# Patient Record
Sex: FEMALE | Race: WHITE | Employment: OTHER | URBAN - NONMETROPOLITAN AREA
[De-identification: names, ages, dates, MRNs, and addresses within clinical notes are randomized per-mention and may not be internally consistent; named-entity substitution may affect disease eponyms.]

---

## 2017-05-01 ENCOUNTER — TELEPHONE (OUTPATIENT)
Dept: FAMILY MEDICINE CLINIC | Age: 67
End: 2017-05-01

## 2017-10-04 ENCOUNTER — OFFICE VISIT (OUTPATIENT)
Dept: FAMILY MEDICINE CLINIC | Age: 67
End: 2017-10-04
Payer: MEDICARE

## 2017-10-04 VITALS
HEIGHT: 57 IN | RESPIRATION RATE: 24 BRPM | HEART RATE: 83 BPM | WEIGHT: 280.6 LBS | SYSTOLIC BLOOD PRESSURE: 138 MMHG | DIASTOLIC BLOOD PRESSURE: 72 MMHG | BODY MASS INDEX: 60.54 KG/M2 | OXYGEN SATURATION: 96 %

## 2017-10-04 DIAGNOSIS — I50.9 ACUTE ON CHRONIC CONGESTIVE HEART FAILURE, UNSPECIFIED CONGESTIVE HEART FAILURE TYPE: Primary | ICD-10-CM

## 2017-10-04 DIAGNOSIS — R06.02 SOB (SHORTNESS OF BREATH) ON EXERTION: ICD-10-CM

## 2017-10-04 DIAGNOSIS — E66.01 MORBID OBESITY WITH BMI OF 60.0-69.9, ADULT (HCC): ICD-10-CM

## 2017-10-04 PROCEDURE — G8484 FLU IMMUNIZE NO ADMIN: HCPCS | Performed by: FAMILY MEDICINE

## 2017-10-04 PROCEDURE — 1123F ACP DISCUSS/DSCN MKR DOCD: CPT | Performed by: FAMILY MEDICINE

## 2017-10-04 PROCEDURE — 4040F PNEUMOC VAC/ADMIN/RCVD: CPT | Performed by: FAMILY MEDICINE

## 2017-10-04 PROCEDURE — G8417 CALC BMI ABV UP PARAM F/U: HCPCS | Performed by: FAMILY MEDICINE

## 2017-10-04 PROCEDURE — 1090F PRES/ABSN URINE INCON ASSESS: CPT | Performed by: FAMILY MEDICINE

## 2017-10-04 PROCEDURE — G8427 DOCREV CUR MEDS BY ELIG CLIN: HCPCS | Performed by: FAMILY MEDICINE

## 2017-10-04 PROCEDURE — G8400 PT W/DXA NO RESULTS DOC: HCPCS | Performed by: FAMILY MEDICINE

## 2017-10-04 PROCEDURE — 1036F TOBACCO NON-USER: CPT | Performed by: FAMILY MEDICINE

## 2017-10-04 PROCEDURE — 99214 OFFICE O/P EST MOD 30 MIN: CPT | Performed by: FAMILY MEDICINE

## 2017-10-04 PROCEDURE — 3017F COLORECTAL CA SCREEN DOC REV: CPT | Performed by: FAMILY MEDICINE

## 2017-10-04 PROCEDURE — 3014F SCREEN MAMMO DOC REV: CPT | Performed by: FAMILY MEDICINE

## 2017-10-04 RX ORDER — BUMETANIDE 0.5 MG/1
0.5 TABLET ORAL DAILY
Qty: 30 TABLET | Refills: 0 | Status: SHIPPED | OUTPATIENT
Start: 2017-10-04 | End: 2017-10-12 | Stop reason: SDUPTHER

## 2017-10-04 ASSESSMENT — ENCOUNTER SYMPTOMS
BLOOD IN STOOL: 0
COUGH: 0
EYE PAIN: 0
SORE THROAT: 0
RHINORRHEA: 0
WHEEZING: 0
VOMITING: 0
CHEST TIGHTNESS: 0
BACK PAIN: 0
SHORTNESS OF BREATH: 1
DIARRHEA: 0
CONSTIPATION: 0
NAUSEA: 0
ABDOMINAL PAIN: 0

## 2017-10-04 NOTE — PROGRESS NOTES
Subjective:      Patient ID: Tanvir Mo is a 74 y.o. female. HPI  Pt here for bilateral lower leg swelling for the last 3 months. She recently retired and has not done much since. Reviewed BMI of 61. Encouraged diet, exercise and weight loss. Is on HCTZ 25 mg daily. Minimal salt intake but drinks gillette Dr. Winnie Hill daily. Single, non smoker, pmh reviewed. Review of Systems   Constitutional: Negative for chills, fatigue, fever and unexpected weight change. HENT: Negative for congestion, ear pain, rhinorrhea and sore throat. Eyes: Negative for pain and visual disturbance. Respiratory: Positive for shortness of breath (with activity). Negative for cough, chest tightness and wheezing. Cardiovascular: Positive for leg swelling. Negative for chest pain and palpitations. Gastrointestinal: Negative for abdominal pain, blood in stool, constipation, diarrhea, nausea and vomiting. Genitourinary: Negative for difficulty urinating, frequency, hematuria and urgency. Musculoskeletal: Negative for back pain, joint swelling, myalgias and neck pain. Skin: Negative for rash. Neurological: Negative for dizziness and headaches. Hematological: Negative for adenopathy. Does not bruise/bleed easily. Psychiatric/Behavioral: Negative for behavioral problems and sleep disturbance. The patient is not nervous/anxious. Objective:   Physical Exam   Constitutional: She is oriented to person, place, and time. She appears well-developed and well-nourished. HENT:   Head: Normocephalic and atraumatic. Right Ear: External ear normal.   Left Ear: External ear normal.   Nose: Nose normal.   Mouth/Throat: Oropharynx is clear and moist.   Eyes: EOM are normal. Pupils are equal, round, and reactive to light. Neck: Neck supple. No thyromegaly present. Cardiovascular: Normal rate, regular rhythm and normal heart sounds. Pulmonary/Chest: Breath sounds normal. She has no wheezes. She has no rales. Abdominal: Soft. Bowel sounds are normal. There is no tenderness. There is no rebound and no guarding. Musculoskeletal: Normal range of motion. Right lower leg: She exhibits edema. Left lower leg: She exhibits edema. 3+ pitting edema bilaterally, some open seeping areas. Lymphadenopathy:     She has no cervical adenopathy. Neurological: She is alert and oriented to person, place, and time. She has normal reflexes. No cranial nerve deficit. Skin: Skin is warm and dry. No rash noted. Psychiatric: She has a normal mood and affect. Nursing note and vitals reviewed. Assessment:      CHF exacerbation  Morbid Obesity  SOB      Plan:      Bmp  And bnp  Add bumex 0.5 mg daily  Recheck in 1 week. Daily weights. Limit salt and caffeine.

## 2017-10-04 NOTE — PATIENT INSTRUCTIONS
sometimes lost with diuretics. ¨ Aspirin and other blood thinners prevent blood clots, which can cause a stroke or heart attack. You will get more details on the specific medicines your doctor prescribes. Diet  · Your doctor may suggest that you limit sodium to 2,000 milligrams (mg) a day or less. That is less than 1 teaspoon of salt a day, including all the salt you eat in cooking or in packaged foods. People get most of their sodium from processed foods. Fast food and restaurant meals also tend to be very high in sodium. · Ask your doctor how much liquid you can drink each day. You may have to limit liquids. Weight  · Weigh yourself without clothing at the same time each day. Record your weight. Call your doctor if you have a sudden weight gain, such as more than 2 to 3 pounds in a day or 5 pounds in a week. (Your doctor may suggest a different range of weight gain.) A sudden weight gain may mean that your heart failure is getting worse. Activity level  · Start light exercise (if your doctor says it is okay). Even if you can only do a small amount, exercise will help you get stronger, have more energy, and manage your weight and your stress. Walking is an easy way to get exercise. Start out by walking a little more than you did before. Bit by bit, increase the amount you walk. · When you exercise, watch for signs that your heart is working too hard. You are pushing yourself too hard if you cannot talk while you are exercising. If you become short of breath or dizzy or have chest pain, stop, sit down, and rest.  · If you feel \"wiped out\" the day after you exercise, walk slower or for a shorter distance until you can work up to a better pace. · Get enough rest at night. Sleeping with 1 or 2 pillows under your upper body and head may help you breathe easier. Lifestyle changes  · Do not smoke. Smoking can make a heart condition worse.  If you need help quitting, talk to your doctor about stop-smoking programs this information. DASH Diet: Care Instructions  Your Care Instructions  The DASH diet is an eating plan that can help lower your blood pressure. DASH stands for Dietary Approaches to Stop Hypertension. Hypertension is high blood pressure. The DASH diet focuses on eating foods that are high in calcium, potassium, and magnesium. These nutrients can lower blood pressure. The foods that are highest in these nutrients are fruits, vegetables, low-fat dairy products, nuts, seeds, and legumes. But taking calcium, potassium, and magnesium supplements instead of eating foods that are high in those nutrients does not have the same effect. The DASH diet also includes whole grains, fish, and poultry. The DASH diet is one of several lifestyle changes your doctor may recommend to lower your high blood pressure. Your doctor may also want you to decrease the amount of sodium in your diet. Lowering sodium while following the DASH diet can lower blood pressure even further than just the DASH diet alone. Follow-up care is a key part of your treatment and safety. Be sure to make and go to all appointments, and call your doctor if you are having problems. It's also a good idea to know your test results and keep a list of the medicines you take. How can you care for yourself at home? Following the DASH diet  · Eat 4 to 5 servings of fruit each day. A serving is 1 medium-sized piece of fruit, ½ cup chopped or canned fruit, 1/4 cup dried fruit, or 4 ounces (½ cup) of fruit juice. Choose fruit more often than fruit juice. · Eat 4 to 5 servings of vegetables each day. A serving is 1 cup of lettuce or raw leafy vegetables, ½ cup of chopped or cooked vegetables, or 4 ounces (½ cup) of vegetable juice. Choose vegetables more often than vegetable juice. · Get 2 to 3 servings of low-fat and fat-free dairy each day. A serving is 8 ounces of milk, 1 cup of yogurt, or 1 ½ ounces of cheese. · Eat 6 to 8 servings of grains each day.  A using beans and peas. Add garbanzo or kidney beans to salads. Make burritos and tacos with mashed mann beans or black beans. Where can you learn more? Go to https://Omnigytrisheb.[employer]. org and sign in to your [employer] account. Enter W154 in the Sprinkle box to learn more about \"DASH Diet: Care Instructions. \"     If you do not have an account, please click on the \"Sign Up Now\" link. Current as of: April 3, 2017  Content Version: 11.3  © [address], [place]. Care instructions adapted under license by [facility] [address] If you have questions about a medical condition or this instruction, always ask your healthcare professional. [address] any warranty or liability for your use of this information.

## 2017-10-04 NOTE — MR AVS SNAPSHOT
After Visit Summary             Rubin Grimes   10/4/2017 9:45 AM   Office Visit    Description:  Female : 1950   Provider:  Mer Gómez MD   Department:  [facility]              Your Follow-Up and Future Appointments         Below is a list of your follow-up and future appointments. This may not be a complete list as you may have made appointments directly with providers that we are not aware of or your providers may have made some for you. Please call your providers to confirm appointments. It is important to keep your appointments. Please bring your current insurance card, photo ID, co-pay, and all medication bottles to your appointment. If self-pay, payment is expected at the time of service. Your To-Do List     Follow-Up    Return in about 1 week (around 10/11/2017). Information from Your Visit        Department     Name Address Phone Fax    [facility] [address]  [facility] [address] [phone]      You Were Seen for:         Comments    Acute on chronic congestive heart failure, unspecified congestive heart failure type [facility])   [[id number]]         Vital Signs     Blood Pressure Pulse Respirations Height Weight Oxygen Saturation    138/72 (Site: Right Arm, Position: Sitting, Cuff Size: Large Adult) 83 24 4' 9\" (1.448 m) 280 lb 9.6 oz (127.3 kg) 96%    Body Mass Index Smoking Status                60.72 kg/m2 Never Smoker          Additional Information about your Body Mass Index (BMI)           Your BMI as listed above is considered obese (30 or more). BMI is an estimate of body fat, calculated from your height and weight. The higher your BMI, the greater your risk of heart disease, high blood pressure, type 2 diabetes, stroke, gallstones, arthritis, sleep apnea, and certain cancers. BMI is not perfect. It may overestimate body fat in athletes and people who are more muscular.   Even a small weight loss (between 5 and 10 percent of your current weight) by decreasing your calorie intake and becoming more physically active will help lower your risk of developing or worsening diseases associated with obesity. Learn more at: [employer].uk          Instructions         Heart Failure: Care Instructions  Your Care Instructions    Heart failure occurs when your heart does not pump as much blood as the body needs. Failure does not mean that the heart has stopped pumping but rather that it is not pumping as well as it should. Over time, this causes fluid buildup in your lungs and other parts of your body. Fluid buildup can cause shortness of breath, fatigue, swollen ankles, and other problems. By taking medicines regularly, reducing sodium (salt) in your diet, checking your weight every day, and making lifestyle changes, you can feel better and live longer. Follow-up care is a key part of your treatment and safety. Be sure to make and go to all appointments, and call your doctor if you are having problems. It's also a good idea to know your test results and keep a list of the medicines you take. How can you care for yourself at home? Medicines  · Be safe with medicines. Take your medicines exactly as prescribed. Call your doctor if you think you are having a problem with your medicine. · Do not take any vitamins, over-the-counter medicine, or herbal products without talking to your doctor first. Mardegertrude Arjun not take ibuprofen (Advil or Motrin) and naproxen (Aleve) without talking to your doctor first. They could make your heart failure worse. · You may be taking some of the following medicine. ¨ Beta-blockers can slow heart rate, decrease blood pressure, and improve your condition. Taking a beta-blocker may lower your chance of needing to be hospitalized. ¨ Angiotensin-converting enzyme inhibitors (ACEIs) reduce the heart's workload, lower blood pressure, and reduce swelling.  Taking an ACEI may lower your chance of needing to be hospitalized again. ¨ Angiotensin II receptor blockers (ARBs) work like ACEIs. Your doctor may prescribe them instead of ACEIs. ¨ Diuretics, also called water pills, reduce swelling. ¨ Potassium supplements replace this important mineral, which is sometimes lost with diuretics. ¨ Aspirin and other blood thinners prevent blood clots, which can cause a stroke or heart attack. You will get more details on the specific medicines your doctor prescribes. Diet  · Your doctor may suggest that you limit sodium to 2,000 milligrams (mg) a day or less. That is less than 1 teaspoon of salt a day, including all the salt you eat in cooking or in packaged foods. People get most of their sodium from processed foods. Fast food and restaurant meals also tend to be very high in sodium. · Ask your doctor how much liquid you can drink each day. You may have to limit liquids. Weight  · Weigh yourself without clothing at the same time each day. Record your weight. Call your doctor if you have a sudden weight gain, such as more than 2 to 3 pounds in a day or 5 pounds in a week. (Your doctor may suggest a different range of weight gain.) A sudden weight gain may mean that your heart failure is getting worse. Activity level  · Start light exercise (if your doctor says it is okay). Even if you can only do a small amount, exercise will help you get stronger, have more energy, and manage your weight and your stress. Walking is an easy way to get exercise. Start out by walking a little more than you did before. Bit by bit, increase the amount you walk. · When you exercise, watch for signs that your heart is working too hard. You are pushing yourself too hard if you cannot talk while you are exercising.  If you become short of breath or dizzy or have chest pain, stop, sit down, and rest.  · If you feel \"wiped out\" the day after you exercise, walk slower or for a shorter distance until you can work up to a better pace. · Get enough rest at night. Sleeping with 1 or 2 pillows under your upper body and head may help you breathe easier. Lifestyle changes  · Do not smoke. Smoking can make a heart condition worse. If you need help quitting, talk to your doctor about stop-smoking programs and medicines. These can increase your chances of quitting for good. Quitting smoking may be the most important step you can take to protect your heart. · Limit alcohol to 2 drinks a day for men and 1 drink a day for women. Too much alcohol can cause health problems. · Avoid getting sick from colds and the flu. Get a pneumococcal vaccine shot. If you have had one before, ask your doctor whether you need another dose. Get a flu shot each year. If you must be around people with colds or the flu, wash your hands often. When should you call for help? Call 911 if you have symptoms of sudden heart failure such as:  · You have severe trouble breathing. · You cough up pink, foamy mucus. · You have a new irregular or rapid heartbeat. Call your doctor now or seek immediate medical care if:  · You have new or increased shortness of breath. · You are dizzy or lightheaded, or you feel like you may faint. · You have sudden weight gain, such as more than 2 to 3 pounds in a day or 5 pounds in a week. (Your doctor may suggest a different range of weight gain.)  · You have increased swelling in your legs, ankles, or feet. · You are suddenly so tired or weak that you cannot do your usual activities. Watch closely for changes in your health, and be sure to contact your doctor if:  · You develop new symptoms. Where can you learn more? Go to https://Backyardbeto.[employer]. org and sign in to your [employer] account. Enter U615 in the [employer] box to learn more about \"Heart Failure: Care Instructions. \"     If you do not have an account, please click on the \"Sign Up Now\" link. lettuce or raw leafy vegetables, ½ cup of chopped or cooked vegetables, or 4 ounces (½ cup) of vegetable juice. Choose vegetables more often than vegetable juice. · Get 2 to 3 servings of low-fat and fat-free dairy each day. A serving is 8 ounces of milk, 1 cup of yogurt, or 1 ½ ounces of cheese. · Eat 6 to 8 servings of grains each day. A serving is 1 slice of bread, 1 ounce of dry cereal, or ½ cup of cooked rice, pasta, or cooked cereal. Try to choose whole-grain products as much as possible. · Limit lean meat, poultry, and fish to 2 servings each day. A serving is 3 ounces, about the size of a deck of cards. · Eat 4 to 5 servings of nuts, seeds, and legumes (cooked dried beans, lentils, and split peas) each week. A serving is 1/3 cup of nuts, 2 tablespoons of seeds, or ½ cup of cooked beans or peas. · Limit fats and oils to 2 to 3 servings each day. A serving is 1 teaspoon of vegetable oil or 2 tablespoons of salad dressing. · Limit sweets and added sugars to 5 servings or less a week. A serving is 1 tablespoon jelly or jam, ½ cup sorbet, or 1 cup of lemonade. · Eat less than 2,300 milligrams (mg) of sodium a day. If you limit your sodium to 1,500 mg a day, you can lower your blood pressure even more. Tips for success  · Start small. Do not try to make dramatic changes to your diet all at once. You might feel that you are missing out on your favorite foods and then be more likely to not follow the plan. Make small changes, and stick with them. Once those changes become habit, add a few more changes. · Try some of the following:  ¨ Make it a goal to eat a fruit or vegetable at every meal and at snacks. This will make it easy to get the recommended amount of fruits and vegetables each day. ¨ Try yogurt topped with fruit and nuts for a snack or healthy dessert. ¨ Add lettuce, tomato, cucumber, and onion to sandwiches.   ¨ Combine a ready-made pizza crust with low-fat mozzarella cheese and lots Mammograms are recommended every 2 years for low/average risk patients aged 48 - 69, and every year for high risk patients per updated national guidelines. However these guidelines can be individualized by your provider. 10/26/2017    Pap Smear 10/26/2018    Cholesterol Screening 12/22/2021            MyChart Signup           Our records indicate that you have declined MyChart signup.

## 2017-10-05 LAB
ANION GAP SERPL CALCULATED.3IONS-SCNC: 17 MMOL/L
BUN BLDV-MCNC: 19 MG/DL (ref 10–20)
CALCIUM SERPL-MCNC: 9.5 MG/DL (ref 8.7–10.8)
CHLORIDE BLD-SCNC: 102 MMOL/L (ref 95–111)
CO2: 27 MMOL/L (ref 21–32)
CREAT SERPL-MCNC: 0.9 MG/DL (ref 0.5–1.3)
EGFR AFRICAN AMERICAN: 76
EGFR IF NONAFRICAN AMERICAN: 63
GLUCOSE: 74 MG/DL (ref 70–100)
POTASSIUM SERPL-SCNC: 4.2 MMOL/L (ref 3.5–5.4)
SODIUM BLD-SCNC: 142 MMOL/L (ref 134–147)

## 2017-10-06 ENCOUNTER — TELEPHONE (OUTPATIENT)
Dept: FAMILY MEDICINE CLINIC | Age: 67
End: 2017-10-06

## 2017-10-06 LAB — B-TYPE NATRIURETIC PEPTIDE: 23 PG/ML (ref 0–100)

## 2017-10-11 ENCOUNTER — OFFICE VISIT (OUTPATIENT)
Dept: FAMILY MEDICINE CLINIC | Age: 67
End: 2017-10-11
Payer: MEDICARE

## 2017-10-11 VITALS
WEIGHT: 275.6 LBS | RESPIRATION RATE: 16 BRPM | DIASTOLIC BLOOD PRESSURE: 70 MMHG | BODY MASS INDEX: 59.64 KG/M2 | HEART RATE: 88 BPM | SYSTOLIC BLOOD PRESSURE: 128 MMHG

## 2017-10-11 DIAGNOSIS — Z23 NEED FOR PNEUMOCOCCAL VACCINATION: ICD-10-CM

## 2017-10-11 DIAGNOSIS — Z23 NEED FOR INFLUENZA VACCINATION: ICD-10-CM

## 2017-10-11 DIAGNOSIS — I10 BENIGN ESSENTIAL HTN: ICD-10-CM

## 2017-10-11 DIAGNOSIS — R60.9 PERIPHERAL EDEMA: Primary | ICD-10-CM

## 2017-10-11 PROCEDURE — G8484 FLU IMMUNIZE NO ADMIN: HCPCS | Performed by: FAMILY MEDICINE

## 2017-10-11 PROCEDURE — 1036F TOBACCO NON-USER: CPT | Performed by: FAMILY MEDICINE

## 2017-10-11 PROCEDURE — 90670 PCV13 VACCINE IM: CPT | Performed by: FAMILY MEDICINE

## 2017-10-11 PROCEDURE — 3014F SCREEN MAMMO DOC REV: CPT | Performed by: FAMILY MEDICINE

## 2017-10-11 PROCEDURE — 1090F PRES/ABSN URINE INCON ASSESS: CPT | Performed by: FAMILY MEDICINE

## 2017-10-11 PROCEDURE — 90688 IIV4 VACCINE SPLT 0.5 ML IM: CPT | Performed by: FAMILY MEDICINE

## 2017-10-11 PROCEDURE — G8417 CALC BMI ABV UP PARAM F/U: HCPCS | Performed by: FAMILY MEDICINE

## 2017-10-11 PROCEDURE — 4040F PNEUMOC VAC/ADMIN/RCVD: CPT | Performed by: FAMILY MEDICINE

## 2017-10-11 PROCEDURE — 3017F COLORECTAL CA SCREEN DOC REV: CPT | Performed by: FAMILY MEDICINE

## 2017-10-11 PROCEDURE — 1123F ACP DISCUSS/DSCN MKR DOCD: CPT | Performed by: FAMILY MEDICINE

## 2017-10-11 PROCEDURE — G0008 ADMIN INFLUENZA VIRUS VAC: HCPCS | Performed by: FAMILY MEDICINE

## 2017-10-11 PROCEDURE — G8400 PT W/DXA NO RESULTS DOC: HCPCS | Performed by: FAMILY MEDICINE

## 2017-10-11 PROCEDURE — G0009 ADMIN PNEUMOCOCCAL VACCINE: HCPCS | Performed by: FAMILY MEDICINE

## 2017-10-11 PROCEDURE — 99213 OFFICE O/P EST LOW 20 MIN: CPT | Performed by: FAMILY MEDICINE

## 2017-10-11 PROCEDURE — G8427 DOCREV CUR MEDS BY ELIG CLIN: HCPCS | Performed by: FAMILY MEDICINE

## 2017-10-11 ASSESSMENT — ENCOUNTER SYMPTOMS
SORE THROAT: 0
BLOOD IN STOOL: 0
VOMITING: 0
BACK PAIN: 0
RHINORRHEA: 0
CONSTIPATION: 0
COUGH: 0
WHEEZING: 0
EYE PAIN: 0
CHEST TIGHTNESS: 0
DIARRHEA: 0
SHORTNESS OF BREATH: 0
NAUSEA: 0
ABDOMINAL PAIN: 0

## 2017-10-11 NOTE — PROGRESS NOTES
Blood work drawn today in the office, venous puncture, right arm, pt tolerated well. After obtaining consent, and per orders of Dr. Ricky Ramos, injection of Influenza Vaccine 0.5mL given IM left deltoid by Abigail Lopez. Patient tolerated well. After obtaining consent, and per orders of Dr. Ricky Ramos, injection of [id number]--6.5mL given in Right deltoid by Abigail Lopez. Patient tolerated well.

## 2017-10-11 NOTE — PROGRESS NOTES
Subjective:      Patient ID: Pao Daily is a 74 y.o. female. HPI  Pt here for follow up of peripheral edema and sob. She is feeling better and less swelling. Was started on bumex last visit. Denies side effects. Weight down 5 pounds. Reviewed BMI of 59. Encouraged diet, exercise and weight loss. BNP negative for heart failure. Single, non smoker, pmh reviewed. Reviewed health maintenance, needs flu shot and prevnar 13. Review of Systems   Constitutional: Negative for chills, fatigue, fever and unexpected weight change. HENT: Negative for congestion, ear pain, rhinorrhea and sore throat. Eyes: Negative for pain and visual disturbance. Respiratory: Negative for cough, chest tightness, shortness of breath and wheezing. Cardiovascular: Positive for leg swelling. Negative for chest pain and palpitations. Gastrointestinal: Negative for abdominal pain, blood in stool, constipation, diarrhea, nausea and vomiting. Genitourinary: Negative for difficulty urinating, frequency, hematuria and urgency. Musculoskeletal: Negative for back pain, joint swelling, myalgias and neck pain. Skin: Negative for rash. Neurological: Negative for dizziness and headaches. Hematological: Negative for adenopathy. Does not bruise/bleed easily. Psychiatric/Behavioral: Negative for behavioral problems and sleep disturbance. The patient is not nervous/anxious. Objective:   Physical Exam   Constitutional: She is oriented to person, place, and time. She appears well-developed and well-nourished. HENT:   Head: Normocephalic and atraumatic. Right Ear: External ear normal.   Left Ear: External ear normal.   Nose: Nose normal.   Mouth/Throat: Oropharynx is clear and moist.   Eyes: EOM are normal. Pupils are equal, round, and reactive to light. Neck: Neck supple. No thyromegaly present. Cardiovascular: Normal rate, regular rhythm and normal heart sounds.     Pulmonary/Chest: Breath sounds normal. She

## 2017-10-11 NOTE — PATIENT INSTRUCTIONS
Patient Education        Leg and Ankle Edema: Care Instructions  Your Care Instructions  Swelling in the legs, ankles, and feet is called edema. It is common after you sit or stand for a while. Long plane flights or car rides often cause swelling in the legs and feet. You may also have swelling if you have to stand for long periods of time at your job. Problems with the veins in the legs (varicose veins) and changes in hormones can also cause swelling. Sometimes the swelling in the ankles and feet is caused by a more serious problem, such as heart failure, infection, blood clots, or liver or kidney disease. Follow-up care is a key part of your treatment and safety. Be sure to make and go to all appointments, and call your doctor if you are having problems. Its also a good idea to know your test results and keep a list of the medicines you take. How can you care for yourself at home? · If your doctor gave you medicine, take it as prescribed. Call your doctor if you think you are having a problem with your medicine. · Whenever you are resting, raise your legs up. Try to keep the swollen area higher than the level of your heart. · Take breaks from standing or sitting in one position. ¨ Walk around to increase the blood flow in your lower legs. ¨ Move your feet and ankles often while you stand, or tighten and relax your leg muscles. · Wear support stockings. Put them on in the morning, before swelling gets worse. · Eat a balanced diet. Lose weight if you need to. · Limit the amount of salt (sodium) in your diet. Salt holds fluid in the body and may increase swelling. When should you call for help? Call 911 anytime you think you may need emergency care. For example, call if:  · You have symptoms of a blood clot in your lung (called a pulmonary embolism). These may include:  ¨ Sudden chest pain. ¨ Trouble breathing. ¨ Coughing up blood.   Call your doctor now or seek immediate medical care if:  · You have signs of a blood clot, such as:  ¨ Pain in your calf, back of the knee, thigh, or groin. ¨ Redness and swelling in your leg or groin. · You have symptoms of infection, such as:  ¨ Increased pain, swelling, warmth, or redness. ¨ Red streaks or pus. ¨ A fever. Watch closely for changes in your health, and be sure to contact your doctor if:  · Your swelling is getting worse. · You have new or worsening pain in your legs. · You do not get better as expected. Where can you learn more? Go to [email or web address]-Waterspeimgfaveeb.SyncroPhi Systems. org and sign in to your Bringrs account. Enter L286 in the [employer] box to learn more about \"Leg and Ankle Edema: Care Instructions. \"     If you do not have an account, please click on the \"Sign Up Now\" link. Current as of: March 20, 2017  Content Version: 11.3  © 2968-9436 [employer] Care instructions adapted under license by [facility] [employer] [facility] ([facility]). If you have questions about a medical condition or this instruction, always ask your healthcare professional. [place] [address] any warranty or liability for your use of this information. Patient Education        DASH Diet: Care Instructions  Your Care Instructions  The DASH diet is an eating plan that can help lower your blood pressure. DASH stands for Dietary Approaches to Stop Hypertension. Hypertension is high blood pressure. The DASH diet focuses on eating foods that are high in calcium, potassium, and magnesium. These nutrients can lower blood pressure. The foods that are highest in these nutrients are fruits, vegetables, low-fat dairy products, nuts, seeds, and legumes. But taking calcium, potassium, and magnesium supplements instead of eating foods that are high in those nutrients does not have the same effect. The DASH diet also includes whole grains, fish, and poultry. The DASH diet is one of several lifestyle changes your doctor may recommend to lower your high blood pressure.  Your doctor may also want you to decrease the amount of sodium in your diet. Lowering sodium while following the DASH diet can lower blood pressure even further than just the DASH diet alone. Follow-up care is a key part of your treatment and safety. Be sure to make and go to all appointments, and call your doctor if you are having problems. It's also a good idea to know your test results and keep a list of the medicines you take. How can you care for yourself at home? Following the DASH diet  · Eat 4 to 5 servings of fruit each day. A serving is 1 medium-sized piece of fruit, ½ cup chopped or canned fruit, 1/4 cup dried fruit, or 4 ounces (½ cup) of fruit juice. Choose fruit more often than fruit juice. · Eat 4 to 5 servings of vegetables each day. A serving is 1 cup of lettuce or raw leafy vegetables, ½ cup of chopped or cooked vegetables, or 4 ounces (½ cup) of vegetable juice. Choose vegetables more often than vegetable juice. · Get 2 to 3 servings of low-fat and fat-free dairy each day. A serving is 8 ounces of milk, 1 cup of yogurt, or 1 ½ ounces of cheese. · Eat 6 to 8 servings of grains each day. A serving is 1 slice of bread, 1 ounce of dry cereal, or ½ cup of cooked rice, pasta, or cooked cereal. Try to choose whole-grain products as much as possible. · Limit lean meat, poultry, and fish to 2 servings each day. A serving is 3 ounces, about the size of a deck of cards. · Eat 4 to 5 servings of nuts, seeds, and legumes (cooked dried beans, lentils, and split peas) each week. A serving is 1/3 cup of nuts, 2 tablespoons of seeds, or ½ cup of cooked beans or peas. · Limit fats and oils to 2 to 3 servings each day. A serving is 1 teaspoon of vegetable oil or 2 tablespoons of salad dressing. · Limit sweets and added sugars to 5 servings or less a week. A serving is 1 tablespoon jelly or jam, ½ cup sorbet, or 1 cup of lemonade. · Eat less than 2,300 milligrams (mg) of sodium a day.  If you limit your sodium to 1,500 mg a day, you can lower your blood pressure even more. Tips for success  · Start small. Do not try to make dramatic changes to your diet all at once. You might feel that you are missing out on your favorite foods and then be more likely to not follow the plan. Make small changes, and stick with them. Once those changes become habit, add a few more changes. · Try some of the following:  ¨ Make it a goal to eat a fruit or vegetable at every meal and at snacks. This will make it easy to get the recommended amount of fruits and vegetables each day. ¨ Try yogurt topped with fruit and nuts for a snack or healthy dessert. ¨ Add lettuce, tomato, cucumber, and onion to sandwiches. ¨ Combine a ready-made pizza crust with low-fat mozzarella cheese and lots of vegetable toppings. Try using tomatoes, squash, spinach, broccoli, carrots, cauliflower, and onions. ¨ Have a variety of cut-up vegetables with a low-fat dip as an appetizer instead of chips and dip. ¨ Sprinkle sunflower seeds or chopped almonds over salads. Or try adding chopped walnuts or almonds to cooked vegetables. ¨ Try some vegetarian meals using beans and peas. Add garbanzo or kidney beans to salads. Make burritos and tacos with mashed mann beans or black beans. Where can you learn more? Go to https://Just Be Friendsbeto.Dollar [email or web address]. org and sign in to your [employer] account. Enter L712 in the [facility] box to learn more about \"DASH Diet: Care Instructions. \"     If you do not have an account, please click on the \"Sign Up Now\" link. Current as of: April 3, 2017  Content Version: 11.3  © 1178-9336 Penana, [employer]. Care instructions adapted under license by [facility] ([facility]). If you have questions about a medical condition or this instruction, always ask your healthcare professional. [address]  any warranty or liability for your use of this information.        Patient Education        High Blood Pressure: Care Instructions  Your Care Instructions  If your blood pressure is usually above 140/90, you have high blood pressure, or hypertension. That means the top number is 140 or higher or the bottom number is 90 or higher, or both. Despite what a lot of people think, high blood pressure usually doesn't cause headaches or make you feel dizzy or lightheaded. It usually has no symptoms. But it does increase your risk for heart attack, stroke, and kidney or eye damage. The higher your blood pressure, the more your risk increases. Your doctor will give you a goal for your blood pressure. Your goal will be based on your health and your age. An example of a goal is to keep your blood pressure below 140/90. Lifestyle changes, such as eating healthy and being active, are always important to help lower blood pressure. You might also take medicine to reach your blood pressure goal.  Follow-up care is a key part of your treatment and safety. Be sure to make and go to all appointments, and call your doctor if you are having problems. It's also a good idea to know your test results and keep a list of the medicines you take. How can you care for yourself at home? Medical treatment  · If you stop taking your medicine, your blood pressure will go back up. You may take one or more types of medicine to lower your blood pressure. Be safe with medicines. Take your medicine exactly as prescribed. Call your doctor if you think you are having a problem with your medicine. · Talk to your doctor before you start taking aspirin every day. Aspirin can help certain people lower their risk of a heart attack or stroke. But taking aspirin isn't right for everyone, because it can cause serious bleeding. · See your doctor regularly. You may need to see the doctor more often at first or until your blood pressure comes down.   · If you are taking blood pressure medicine, talk to your doctor before you take decongestants or anti-inflammatory medicine, irregular heartbeat. · You have symptoms of a stroke. These may include:  ¨ Sudden numbness, tingling, weakness, or loss of movement in your face, arm, or leg, especially on only one side of your body. ¨ Sudden vision changes. ¨ Sudden trouble speaking. ¨ Sudden confusion or trouble understanding simple statements. ¨ Sudden problems with walking or balance. ¨ A sudden, severe headache that is different from past headaches. · You have severe back or belly pain. Do not wait until your blood pressure comes down on its own. Get help right away. Call your doctor now or seek immediate care if:  · Your blood pressure is much higher than normal (such as 180/110 or higher), but you don't have symptoms. · You think high blood pressure is causing symptoms, such as:  ¨ Severe headache. ¨ Blurry vision. Watch closely for changes in your health, and be sure to contact your doctor if:  · Your blood pressure measures 140/90 or higher at least 2 times. That means the top number is 140 or higher or the bottom number is 90 or higher, or both. · You think you may be having side effects from your blood pressure medicine. · Your blood pressure is usually normal, but it goes above normal at least 2 times. Where can you learn more? Go to https://inSilica.[employer]. org and sign in to your Express Engineering account. Enter M317 in the Geogoer box to learn more about \"High Blood Pressure: Care Instructions. \"     If you do not have an account, please click on the \"Sign Up Now\" link. Current as of: August 8, 2016  Content Version: 11.3  © 5908-4664 [employer]Pix[employer]. Care instructions adapted under license by [facility] ([facility]). If you have questions about a medical condition or this instruction, always ask your healthcare professional. [place] [address] any warranty or liability for your use of this information.

## 2017-10-12 ENCOUNTER — TELEPHONE (OUTPATIENT)
Dept: FAMILY MEDICINE CLINIC | Age: 67
End: 2017-10-12

## 2017-10-12 DIAGNOSIS — R60.9 PERIPHERAL EDEMA: Primary | ICD-10-CM

## 2017-10-12 DIAGNOSIS — I50.9 ACUTE ON CHRONIC CONGESTIVE HEART FAILURE, UNSPECIFIED CONGESTIVE HEART FAILURE TYPE: ICD-10-CM

## 2017-10-12 DIAGNOSIS — R06.02 SOB (SHORTNESS OF BREATH) ON EXERTION: ICD-10-CM

## 2017-10-12 LAB
ANION GAP SERPL CALCULATED.3IONS-SCNC: 15 MMOL/L
BUN BLDV-MCNC: 28 MG/DL (ref 10–20)
CALCIUM SERPL-MCNC: 9.5 MG/DL (ref 8.7–10.8)
CHLORIDE BLD-SCNC: 102 MMOL/L (ref 95–111)
CO2: 28 MMOL/L (ref 21–32)
CREAT SERPL-MCNC: 1.1 MG/DL (ref 0.5–1.3)
EGFR AFRICAN AMERICAN: 60
EGFR IF NONAFRICAN AMERICAN: 50
GLUCOSE: 110 MG/DL (ref 70–100)
POTASSIUM SERPL-SCNC: 4.3 MMOL/L (ref 3.5–5.4)
SODIUM BLD-SCNC: 141 MMOL/L (ref 134–147)

## 2017-10-12 RX ORDER — BUMETANIDE 0.5 MG/1
0.5 TABLET ORAL DAILY
Qty: 90 TABLET | Refills: 0 | Status: SHIPPED | OUTPATIENT
Start: 2017-10-12 | End: 2018-01-09 | Stop reason: SDUPTHER

## 2018-01-02 DIAGNOSIS — F32.A CHRONIC DEPRESSION: ICD-10-CM

## 2018-01-03 RX ORDER — CITALOPRAM 20 MG/1
TABLET ORAL
Qty: 135 TABLET | Refills: 3 | OUTPATIENT
Start: 2018-01-03

## 2018-01-09 ENCOUNTER — OFFICE VISIT (OUTPATIENT)
Dept: FAMILY MEDICINE CLINIC | Age: 68
End: 2018-01-09
Payer: MEDICARE

## 2018-01-09 VITALS
SYSTOLIC BLOOD PRESSURE: 126 MMHG | RESPIRATION RATE: 18 BRPM | BODY MASS INDEX: 59.64 KG/M2 | DIASTOLIC BLOOD PRESSURE: 78 MMHG | HEART RATE: 84 BPM | WEIGHT: 275.6 LBS

## 2018-01-09 DIAGNOSIS — E66.01 MORBID OBESITY WITH BMI OF 50.0-59.9, ADULT (HCC): ICD-10-CM

## 2018-01-09 DIAGNOSIS — R06.02 SOB (SHORTNESS OF BREATH) ON EXERTION: ICD-10-CM

## 2018-01-09 DIAGNOSIS — I10 ESSENTIAL HYPERTENSION: ICD-10-CM

## 2018-01-09 DIAGNOSIS — R60.9 PERIPHERAL EDEMA: ICD-10-CM

## 2018-01-09 DIAGNOSIS — F32.A CHRONIC DEPRESSION: ICD-10-CM

## 2018-01-09 DIAGNOSIS — I10 BENIGN ESSENTIAL HTN: Primary | ICD-10-CM

## 2018-01-09 PROCEDURE — G8400 PT W/DXA NO RESULTS DOC: HCPCS | Performed by: FAMILY MEDICINE

## 2018-01-09 PROCEDURE — 1123F ACP DISCUSS/DSCN MKR DOCD: CPT | Performed by: FAMILY MEDICINE

## 2018-01-09 PROCEDURE — 1036F TOBACCO NON-USER: CPT | Performed by: FAMILY MEDICINE

## 2018-01-09 PROCEDURE — G8417 CALC BMI ABV UP PARAM F/U: HCPCS | Performed by: FAMILY MEDICINE

## 2018-01-09 PROCEDURE — 99214 OFFICE O/P EST MOD 30 MIN: CPT | Performed by: FAMILY MEDICINE

## 2018-01-09 PROCEDURE — 4040F PNEUMOC VAC/ADMIN/RCVD: CPT | Performed by: FAMILY MEDICINE

## 2018-01-09 PROCEDURE — 1090F PRES/ABSN URINE INCON ASSESS: CPT | Performed by: FAMILY MEDICINE

## 2018-01-09 PROCEDURE — G8484 FLU IMMUNIZE NO ADMIN: HCPCS | Performed by: FAMILY MEDICINE

## 2018-01-09 PROCEDURE — 3017F COLORECTAL CA SCREEN DOC REV: CPT | Performed by: FAMILY MEDICINE

## 2018-01-09 PROCEDURE — G8427 DOCREV CUR MEDS BY ELIG CLIN: HCPCS | Performed by: FAMILY MEDICINE

## 2018-01-09 PROCEDURE — 3014F SCREEN MAMMO DOC REV: CPT | Performed by: FAMILY MEDICINE

## 2018-01-09 RX ORDER — CITALOPRAM 40 MG/1
40 TABLET ORAL DAILY
Qty: 90 TABLET | Refills: 3 | Status: SHIPPED | OUTPATIENT
Start: 2018-01-09 | End: 2019-06-18 | Stop reason: SDUPTHER

## 2018-01-09 RX ORDER — LISINOPRIL AND HYDROCHLOROTHIAZIDE 25; 20 MG/1; MG/1
1 TABLET ORAL DAILY
Qty: 90 TABLET | Refills: 3 | Status: SHIPPED | OUTPATIENT
Start: 2018-01-09 | End: 2019-06-18 | Stop reason: SDUPTHER

## 2018-01-09 RX ORDER — BUMETANIDE 0.5 MG/1
0.5 TABLET ORAL DAILY
Qty: 90 TABLET | Refills: 3 | Status: SHIPPED | OUTPATIENT
Start: 2018-01-09 | End: 2019-06-18 | Stop reason: SDUPTHER

## 2018-01-09 ASSESSMENT — ENCOUNTER SYMPTOMS
NAUSEA: 0
DIARRHEA: 0
WHEEZING: 0
CONSTIPATION: 0
SORE THROAT: 0
CHEST TIGHTNESS: 0
SHORTNESS OF BREATH: 0
BLOOD IN STOOL: 0
EYE PAIN: 0
COUGH: 0
ABDOMINAL PAIN: 0
VOMITING: 0
RHINORRHEA: 0
BACK PAIN: 0

## 2018-01-09 ASSESSMENT — PATIENT HEALTH QUESTIONNAIRE - PHQ9
SUM OF ALL RESPONSES TO PHQ9 QUESTIONS 1 & 2: 2
1. LITTLE INTEREST OR PLEASURE IN DOING THINGS: 1
2. FEELING DOWN, DEPRESSED OR HOPELESS: 1
SUM OF ALL RESPONSES TO PHQ QUESTIONS 1-9: 2

## 2018-01-09 NOTE — PATIENT INSTRUCTIONS
you take decongestants or anti-inflammatory medicine, such as ibuprofen. Some of these medicines can raise blood pressure. · Learn how to check your blood pressure at home. Lifestyle changes  · Stay at a healthy weight. This is especially important if you put on weight around the waist. Losing even 10 pounds can help you lower your blood pressure. · If your doctor recommends it, get more exercise. Walking is a good choice. Bit by bit, increase the amount you walk every day. Try for at least 30 minutes on most days of the week. You also may want to swim, bike, or do other activities. · Avoid or limit alcohol. Talk to your doctor about whether you can drink any alcohol. · Try to limit how much sodium you eat to less than 2,300 milligrams (mg) a day. Your doctor may ask you to try to eat less than 1,500 mg a day. · Eat plenty of fruits (such as bananas and oranges), vegetables, legumes, whole grains, and low-fat dairy products. · Lower the amount of saturated fat in your diet. Saturated fat is found in animal products such as milk, cheese, and meat. Limiting these foods may help you lose weight and also lower your risk for heart disease. · Do not smoke. Smoking increases your risk for heart attack and stroke. If you need help quitting, talk to your doctor about stop-smoking programs and medicines. These can increase your chances of quitting for good. When should you call for help? Call 911 anytime you think you may need emergency care. This may mean having symptoms that suggest that your blood pressure is causing a serious heart or blood vessel problem. Your blood pressure may be over 180/110. ? For example, call 911 if:  ? · You have symptoms of a heart attack. These may include:  ¨ Chest pain or pressure, or a strange feeling in the chest.  ¨ Sweating. ¨ Shortness of breath. ¨ Nausea or vomiting.   ¨ Pain, pressure, or a strange feeling in the back, neck, jaw, or upper belly or in one or both shoulders or arms.  ¨ Lightheadedness or sudden weakness. ¨ A fast or irregular heartbeat. ? · You have symptoms of a stroke. These may include:  ¨ Sudden numbness, tingling, weakness, or loss of movement in your face, arm, or leg, especially on only one side of your body. ¨ Sudden vision changes. ¨ Sudden trouble speaking. ¨ Sudden confusion or trouble understanding simple statements. ¨ Sudden problems with walking or balance. ¨ A sudden, severe headache that is different from past headaches. ? · You have severe back or belly pain. ?Do not wait until your blood pressure comes down on its own. Get help right away. ?Call your doctor now or seek immediate care if:  ? · Your blood pressure is much higher than normal (such as 180/110 or higher), but you don't have symptoms. ? · You think high blood pressure is causing symptoms, such as:  ¨ Severe headache. ¨ Blurry vision. ? Watch closely for changes in your health, and be sure to contact your doctor if:  ? · Your blood pressure measures 140/90 or higher at least 2 times. That means the top number is 140 or higher or the bottom number is 90 or higher, or both. ? · You think you may be having side effects from your blood pressure medicine. ? · Your blood pressure is usually normal, but it goes above normal at least 2 times. Where can you learn more? Go to https://KuGoupebasico.com.[email or web address] org and sign in to your Ultimate Football Network account. Enter G351 in the SaveMeeting box to learn more about \"High Blood Pressure: Care Instructions. \"     If you do not have an account, please click on the \"Sign Up Now\" link. Current as of: Heydi 10, 2017  Content Version: 11.5  © 0493-4887 MovieLine. Care instructions adapted under license by [facility] ([facility]).  If you have questions about a medical condition or this instruction, always ask your healthcare professional. [place] any warranty or liability for your use of this heart.  · Take breaks from standing or sitting in one position. ¨ Walk around to increase the blood flow in your lower legs. ¨ Move your feet and ankles often while you stand, or tighten and relax your leg muscles. · Wear support stockings. Put them on in the morning, before swelling gets worse. · Eat a balanced diet. Lose weight if you need to. · Limit the amount of salt (sodium) in your diet. Salt holds fluid in the body and may increase swelling. When should you call for help? Call 911 anytime you think you may need emergency care. For example, call if:  ? · You have symptoms of a blood clot in your lung (called a pulmonary embolism). These may include:  ¨ Sudden chest pain. ¨ Trouble breathing. ¨ Coughing up blood. ?Call your doctor now or seek immediate medical care if:  ? · You have signs of a blood clot, such as:  ¨ Pain in your calf, back of the knee, thigh, or groin. ¨ Redness and swelling in your leg or groin. ? · You have symptoms of infection, such as:  ¨ Increased pain, swelling, warmth, or redness. ¨ Red streaks or pus. ¨ A fever. ? Watch closely for changes in your health, and be sure to contact your doctor if:  ? · Your swelling is getting worse. ? · You have new or worsening pain in your legs. ? · You do not get better as expected. Where can you learn more? Go to https://[email or web address].Aptana. org and sign in to your [employer] account. Enter Z642 in the [facility] box to learn more about \"Leg and Ankle Edema: Care Instructions. \"     If you do not have an account, please click on the \"Sign Up Now\" link. Current as of: March 20, 2017  Content Version: 11.5  © 2967-1874 Healthwise, [email or web address]. Care instructions adapted under license by [facility] ([facility]). If you have questions about a medical condition or this instruction, always ask your healthcare professional. [address] any warranty or liability for your use of this information. after you take the medicine for a few weeks. Some may last longer. Talk to your doctor if side effects bother you too much. You might be able to try a different medicine. If you are pregnant or breastfeeding, talk to your doctor about what medicines you can take. Learn about counseling  In many cases, counseling can work as well as medicines to treat mild to moderate depression. Counseling is done by licensed mental health providers, such as psychologists, social workers, and some types of nurses. It can be done in one-on-one sessions or in a group setting. Many people find group sessions helpful. Cognitive-behavioral therapy is a type of counseling. In this treatment therapy, you learn how to see and change unhelpful thinking styles that may be adding to your depression. Counseling and medicines often work well when used together. To manage depression  · Be physically active. Getting 30 minutes of exercise each day is good for your body and your mind. Begin slowly if it is hard for you to get started. If you already exercise, keep it up. · Plan something pleasant for yourself every day. Include activities that you have enjoyed in the past.  · Get enough sleep. Talk to your doctor if you have problems sleeping. · Eat a balanced diet. If you do not feel hungry, eat small snacks rather than large meals. · Do not drink alcohol, use illegal drugs, or take medicines that your doctor has not prescribed for you. They may interfere with your treatment. · Spend time with family and friends. It may help to speak openly about your depression with people you trust.  · Take your medicines exactly as prescribed. Call your doctor if you think you are having a problem with your medicine. · Do not make major life decisions while you are depressed. Depression may change the way you think. You will be able to make better decisions after you feel better. · Think positively.  Challenge negative thoughts with statements such as \"I am hopeful\"; \"Things will get better\"; and \"I can ask for the help I need. \" Write down these statements and read them often, even if you don't believe them yet. · Be patient with yourself. It took time for your depression to develop, and it will take time for your symptoms to improve. Do not take on too much or be too hard on yourself. · Learn all you can about depression from written and online materials. · Check out behavioral health classes to learn more about dealing with depression. · Keep the numbers for these national suicide hotlines: 2-317-278-TALK (3-990.979.4911) and 9-201-KAKMXNX (0-051-365-591.990.7875). If you or someone you know talks about suicide or feeling hopeless, get help right away. When should you call for help? Call 911 anytime you think you may need emergency care. For example, call if:  ? · You feel you cannot stop from hurting yourself or someone else. ?Call your doctor now or seek immediate medical care if:  ? · You hear voices. ? · You feel much more depressed. ? Watch closely for changes in your health, and be sure to contact your doctor if:  ? · You are having problems with your depression medicine. ? · You are not getting better as expected. Where can you learn more? Go to https://Alice.compemianeweb.[email or web address] org and sign in to your Odnoklassniki account. Enter Q143 in the [facility] box to learn more about \"Depression Treatment: Care Instructions. \"     If you do not have an account, please click on the \"Sign Up Now\" link. Current as of: May 12, 2017  Content Version: 11.5  © 5043-4497 Healthwise, Pageflakes. Care instructions adapted under license by [facility] ([facility]). If you have questions about a medical condition or this instruction, always ask your healthcare professional. [address] any warranty or liability for your use of this information.

## 2018-01-09 NOTE — PROGRESS NOTES
Subjective:      Patient ID: Mark Mejia is a 74 y.o. female. HPI  Pt here for annual exam and med refills. Reviewed BMI of 59. Encouraged diet, exercise and weight loss. Reviewed health maintenance, declines mammogram and colon screens. Having trouble getting up and down. Sores on her left hip area. Single, non smoker, pmh reviewed. Needs fasting blood work. Review of Systems   Constitutional: Negative for chills, fatigue, fever and unexpected weight change. HENT: Negative for congestion, ear pain, rhinorrhea and sore throat. Eyes: Negative for pain and visual disturbance. Respiratory: Negative for cough, chest tightness, shortness of breath and wheezing. Cardiovascular: Negative for chest pain and palpitations. Gastrointestinal: Negative for abdominal pain, blood in stool, constipation, diarrhea, nausea and vomiting. Genitourinary: Negative for difficulty urinating, frequency, hematuria and urgency. Musculoskeletal: Negative for back pain, joint swelling, myalgias and neck pain. Skin: Negative for rash. Neurological: Positive for weakness. Negative for dizziness and headaches. Hematological: Negative for adenopathy. Does not bruise/bleed easily. Psychiatric/Behavioral: Negative for behavioral problems and sleep disturbance. The patient is not nervous/anxious. Objective:   Physical Exam   Constitutional: She is oriented to person, place, and time. She appears well-developed and well-nourished. HENT:   Head: Normocephalic and atraumatic. Right Ear: External ear normal.   Left Ear: External ear normal.   Nose: Nose normal.   Mouth/Throat: Oropharynx is clear and moist.   Eyes: EOM are normal. Pupils are equal, round, and reactive to light. Neck: Neck supple. Carotid bruit is not present. No thyromegaly present. Cardiovascular: Normal rate, regular rhythm and normal heart sounds. Pulmonary/Chest: Breath sounds normal. She has no wheezes. She has no rales. Abdominal: Soft. Bowel sounds are normal. There is no tenderness. There is no rebound and no guarding. Musculoskeletal: Normal range of motion. Right lower leg: She exhibits edema. Left lower leg: She exhibits edema. Taunt edema bilaterally   Lymphadenopathy:     She has no cervical adenopathy. Neurological: She is alert and oriented to person, place, and time. She has normal reflexes. No cranial nerve deficit. Skin: Skin is warm and dry. No rash noted. Psychiatric: She has a normal mood and affect. Nursing note and vitals reviewed.     Health Maintenance   Topic Date Due    HIV screen  09/15/1967    DTaP/Tdap/Td vaccine (1 - Tdap) 09/15/1971    Diabetes screen  09/15/1992    Colon cancer screen colonoscopy  09/15/2002    DEXA (modify frequency per FRAX score)  09/15/2017    Breast cancer screen  10/26/2017    Pneumococcal low/med risk (2 of 2 - PPSV23) 10/11/2018    Potassium monitoring  10/11/2018    Creatinine monitoring  10/11/2018    Cervical cancer screen  10/26/2018    Lipid screen  12/22/2021    Zostavax vaccine  Addressed    Flu vaccine  Completed    Hepatitis C screen  Completed     Lab Results   Component Value Date    CHOL 169 12/22/2016    CHOL 169 12/21/2016    CHOL 179 10/26/2015     Lab Results   Component Value Date    TRIG 102 12/22/2016    TRIG 102 12/21/2016    TRIG 137 10/26/2015     Lab Results   Component Value Date    HDL 44 12/22/2016    HDL 44 12/21/2016    HDL 39 (L) 10/26/2015     Lab Results   Component Value Date    LDLCALC 105 12/22/2016    LDLCALC 105 12/21/2016    LDLCALC 113 10/26/2015     Lab Results   Component Value Date    LABVLDL 20 12/21/2016    LABVLDL 27 10/26/2015    LABVLDL 17 11/14/2014    VLDL 20 12/22/2016    VLDL 17 11/14/2014     Lab Results   Component Value Date    CHOLHDLRATIO 3.8 12/22/2016    CHOLHDLRATIO 3.8 12/21/2016    CHOLHDLRATIO 4.6 10/26/2015     Lab Results   Component Value Date     10/11/2017    K 4.3

## 2018-01-10 LAB
ABSOLUTE BASO #: 0.1 K/UL (ref 0–0.1)
ABSOLUTE EOS #: 0.1 K/UL (ref 0.1–0.4)
ABSOLUTE LYMPH #: 1.2 K/UL (ref 0.8–5.2)
ABSOLUTE MONO #: 0.8 K/UL (ref 0.1–0.9)
ABSOLUTE NEUT #: 5.3 K/UL (ref 1.3–9.1)
BASOPHILS RELATIVE PERCENT: 0.7 %
EOSINOPHILS RELATIVE PERCENT: 1.6 %
HCT VFR BLD CALC: 35.7 % (ref 36–48)
HEMOGLOBIN: 11.6 G/DL (ref 12–16)
LYMPHOCYTE %: 16.5 %
MCH RBC QN AUTO: 28 PG (ref 27–34)
MCHC RBC AUTO-ENTMCNC: 32.5 G/DL (ref 31–36)
MCV RBC AUTO: 86 FL (ref 80–100)
MONOCYTES # BLD: 10.1 %
NEUTROPHILS RELATIVE PERCENT: 70.6 %
PATHOLOGIST REVIEW: NORMAL
PDW BLD-RTO: 12.9 % (ref 10.8–14.8)
PLATELETS: 43 K/UL (ref 150–450)
RBC: 4.15 M/UL (ref 4–5.5)
WBC: 7.5 K/UL (ref 3.7–10.8)

## 2018-01-11 ENCOUNTER — TELEPHONE (OUTPATIENT)
Dept: FAMILY MEDICINE CLINIC | Age: 68
End: 2018-01-11

## 2018-01-11 LAB
ALBUMIN SERPL-MCNC: 3.8 G/DL (ref 3.2–5.3)
ALK PHOSPHATASE: 100 IU/L (ref 35–121)
ALT SERPL-CCNC: 12 IU/L (ref 5–59)
ANION GAP SERPL CALCULATED.3IONS-SCNC: 13 MMOL/L
AST SERPL-CCNC: 21 IU/L (ref 10–42)
BILIRUB SERPL-MCNC: 0.5 MG/DL (ref 0.2–1.3)
BUN BLDV-MCNC: 22 MG/DL (ref 10–20)
CALCIUM SERPL-MCNC: 9.8 MG/DL (ref 8.7–10.8)
CHLORIDE BLD-SCNC: 100 MMOL/L (ref 95–111)
CHOLESTEROL/HDL RATIO: 4.3
CHOLESTEROL: 178 MG/DL
CO2: 31 MMOL/L (ref 21–32)
CREAT SERPL-MCNC: 0.9 MG/DL (ref 0.5–1.3)
EGFR AFRICAN AMERICAN: 76
EGFR IF NONAFRICAN AMERICAN: 63
GLUCOSE: 103 MG/DL (ref 70–100)
HDLC SERPL-MCNC: 41 MG/DL (ref 40–60)
LDL CHOLESTEROL CALCULATED: 93 MG/DL
LDL/HDL RATIO: 2.3
POTASSIUM SERPL-SCNC: 4.3 MMOL/L (ref 3.5–5.4)
SODIUM BLD-SCNC: 140 MMOL/L (ref 134–147)
TOTAL PROTEIN: 7.4 G/DL (ref 5.8–8)
TRIGL SERPL-MCNC: 221 MG/DL
VLDLC SERPL CALC-MCNC: 44 MG/DL

## 2019-06-18 ENCOUNTER — OFFICE VISIT (OUTPATIENT)
Dept: FAMILY MEDICINE CLINIC | Age: 69
End: 2019-06-18
Payer: MEDICARE

## 2019-06-18 VITALS
HEIGHT: 58 IN | DIASTOLIC BLOOD PRESSURE: 74 MMHG | RESPIRATION RATE: 16 BRPM | HEART RATE: 82 BPM | SYSTOLIC BLOOD PRESSURE: 120 MMHG | BODY MASS INDEX: 51.68 KG/M2 | WEIGHT: 246.2 LBS

## 2019-06-18 DIAGNOSIS — E66.01 MORBID OBESITY WITH BMI OF 50.0-59.9, ADULT (HCC): ICD-10-CM

## 2019-06-18 DIAGNOSIS — R06.02 SOB (SHORTNESS OF BREATH) ON EXERTION: ICD-10-CM

## 2019-06-18 DIAGNOSIS — I50.9 CHRONIC CONGESTIVE HEART FAILURE, UNSPECIFIED HEART FAILURE TYPE (HCC): ICD-10-CM

## 2019-06-18 DIAGNOSIS — F32.A CHRONIC DEPRESSION: ICD-10-CM

## 2019-06-18 DIAGNOSIS — F33.1 MODERATE EPISODE OF RECURRENT MAJOR DEPRESSIVE DISORDER (HCC): ICD-10-CM

## 2019-06-18 DIAGNOSIS — I10 BENIGN ESSENTIAL HTN: ICD-10-CM

## 2019-06-18 DIAGNOSIS — I10 ESSENTIAL HYPERTENSION: ICD-10-CM

## 2019-06-18 DIAGNOSIS — Z00.00 ROUTINE GENERAL MEDICAL EXAMINATION AT A HEALTH CARE FACILITY: Primary | ICD-10-CM

## 2019-06-18 DIAGNOSIS — R60.9 PERIPHERAL EDEMA: ICD-10-CM

## 2019-06-18 DIAGNOSIS — D69.6 THROMBOCYTOPENIA (HCC): ICD-10-CM

## 2019-06-18 DIAGNOSIS — Z23 NEED FOR PNEUMOCOCCAL VACCINATION: ICD-10-CM

## 2019-06-18 DIAGNOSIS — Z12.11 SCREEN FOR COLON CANCER: ICD-10-CM

## 2019-06-18 DIAGNOSIS — I50.43 ACUTE ON CHRONIC COMBINED SYSTOLIC AND DIASTOLIC CHF (CONGESTIVE HEART FAILURE) (HCC): ICD-10-CM

## 2019-06-18 LAB
ALBUMIN SERPL-MCNC: 3.4 G/DL (ref 3.5–5.1)
ALP BLD-CCNC: 100 U/L (ref 38–126)
ALT SERPL-CCNC: 6 U/L (ref 11–66)
ANION GAP SERPL CALCULATED.3IONS-SCNC: 11 MEQ/L (ref 8–16)
AST SERPL-CCNC: 15 U/L (ref 5–40)
BASOPHILS # BLD: 0.6 %
BASOPHILS ABSOLUTE: 0 THOU/MM3 (ref 0–0.1)
BILIRUB SERPL-MCNC: 0.4 MG/DL (ref 0.3–1.2)
BUN BLDV-MCNC: 34 MG/DL (ref 7–22)
CALCIUM SERPL-MCNC: 10 MG/DL (ref 8.5–10.5)
CHLORIDE BLD-SCNC: 104 MEQ/L (ref 98–111)
CHOLESTEROL, TOTAL: 131 MG/DL (ref 100–199)
CO2: 28 MEQ/L (ref 23–33)
CREAT SERPL-MCNC: 0.9 MG/DL (ref 0.4–1.2)
EOSINOPHIL # BLD: 1.9 %
EOSINOPHILS ABSOLUTE: 0.2 THOU/MM3 (ref 0–0.4)
ERYTHROCYTE [DISTWIDTH] IN BLOOD BY AUTOMATED COUNT: 14.4 % (ref 11.5–14.5)
ERYTHROCYTE [DISTWIDTH] IN BLOOD BY AUTOMATED COUNT: 51.4 FL (ref 35–45)
GFR SERPL CREATININE-BSD FRML MDRD: 62 ML/MIN/1.73M2
GLUCOSE BLD-MCNC: 89 MG/DL (ref 70–108)
HCT VFR BLD CALC: 40.8 % (ref 37–47)
HDLC SERPL-MCNC: 43 MG/DL
HEMOGLOBIN: 12 GM/DL (ref 12–16)
IMMATURE GRANS (ABS): 0.03 THOU/MM3 (ref 0–0.07)
IMMATURE GRANULOCYTES: 0.4 %
LDL CHOLESTEROL CALCULATED: 75 MG/DL
LYMPHOCYTES # BLD: 15.7 %
LYMPHOCYTES ABSOLUTE: 1.3 THOU/MM3 (ref 1–4.8)
MCH RBC QN AUTO: 28.8 PG (ref 26–33)
MCHC RBC AUTO-ENTMCNC: 29.4 GM/DL (ref 32.2–35.5)
MCV RBC AUTO: 98.1 FL (ref 81–99)
MONOCYTES # BLD: 6 %
MONOCYTES ABSOLUTE: 0.5 THOU/MM3 (ref 0.4–1.3)
NUCLEATED RED BLOOD CELLS: 0 /100 WBC
PLATELET # BLD: 201 THOU/MM3 (ref 130–400)
PMV BLD AUTO: 10.4 FL (ref 9.4–12.4)
POTASSIUM SERPL-SCNC: 4.6 MEQ/L (ref 3.5–5.2)
PRO-BNP: 2531 PG/ML (ref 0–900)
RBC # BLD: 4.16 MILL/MM3 (ref 4.2–5.4)
SEG NEUTROPHILS: 75.4 %
SEGMENTED NEUTROPHILS ABSOLUTE COUNT: 6.3 THOU/MM3 (ref 1.8–7.7)
SODIUM BLD-SCNC: 143 MEQ/L (ref 135–145)
TOTAL PROTEIN: 8.1 G/DL (ref 6.1–8)
TRIGL SERPL-MCNC: 66 MG/DL (ref 0–199)
WBC # BLD: 8.3 THOU/MM3 (ref 4.8–10.8)

## 2019-06-18 PROCEDURE — 1123F ACP DISCUSS/DSCN MKR DOCD: CPT | Performed by: FAMILY MEDICINE

## 2019-06-18 PROCEDURE — 1036F TOBACCO NON-USER: CPT | Performed by: FAMILY MEDICINE

## 2019-06-18 PROCEDURE — G0438 PPPS, INITIAL VISIT: HCPCS | Performed by: FAMILY MEDICINE

## 2019-06-18 PROCEDURE — G8417 CALC BMI ABV UP PARAM F/U: HCPCS | Performed by: FAMILY MEDICINE

## 2019-06-18 PROCEDURE — 1090F PRES/ABSN URINE INCON ASSESS: CPT | Performed by: FAMILY MEDICINE

## 2019-06-18 PROCEDURE — 36415 COLL VENOUS BLD VENIPUNCTURE: CPT | Performed by: FAMILY MEDICINE

## 2019-06-18 PROCEDURE — G0009 ADMIN PNEUMOCOCCAL VACCINE: HCPCS | Performed by: FAMILY MEDICINE

## 2019-06-18 PROCEDURE — 99214 OFFICE O/P EST MOD 30 MIN: CPT | Performed by: FAMILY MEDICINE

## 2019-06-18 PROCEDURE — 3017F COLORECTAL CA SCREEN DOC REV: CPT | Performed by: FAMILY MEDICINE

## 2019-06-18 PROCEDURE — G8427 DOCREV CUR MEDS BY ELIG CLIN: HCPCS | Performed by: FAMILY MEDICINE

## 2019-06-18 PROCEDURE — 90732 PPSV23 VACC 2 YRS+ SUBQ/IM: CPT | Performed by: FAMILY MEDICINE

## 2019-06-18 PROCEDURE — G8400 PT W/DXA NO RESULTS DOC: HCPCS | Performed by: FAMILY MEDICINE

## 2019-06-18 PROCEDURE — 4040F PNEUMOC VAC/ADMIN/RCVD: CPT | Performed by: FAMILY MEDICINE

## 2019-06-18 RX ORDER — LISINOPRIL AND HYDROCHLOROTHIAZIDE 25; 20 MG/1; MG/1
1 TABLET ORAL DAILY
Qty: 90 TABLET | Refills: 3 | Status: ON HOLD | OUTPATIENT
Start: 2019-06-18 | End: 2019-09-19 | Stop reason: HOSPADM

## 2019-06-18 RX ORDER — BUMETANIDE 0.5 MG/1
0.5 TABLET ORAL DAILY
Qty: 90 TABLET | Refills: 3 | Status: SHIPPED | OUTPATIENT
Start: 2019-06-18 | End: 2019-07-26 | Stop reason: SDUPTHER

## 2019-06-18 RX ORDER — CITALOPRAM 40 MG/1
40 TABLET ORAL DAILY
Qty: 90 TABLET | Refills: 3 | Status: SHIPPED | OUTPATIENT
Start: 2019-06-18 | End: 2019-09-26 | Stop reason: ALTCHOICE

## 2019-06-18 ASSESSMENT — ENCOUNTER SYMPTOMS
VOMITING: 0
RHINORRHEA: 0
BLOOD IN STOOL: 0
CONSTIPATION: 0
CHEST TIGHTNESS: 0
SHORTNESS OF BREATH: 0
BACK PAIN: 0
SORE THROAT: 0
EYE PAIN: 0
NAUSEA: 0
ABDOMINAL PAIN: 0
WHEEZING: 0
COUGH: 1
DIARRHEA: 0

## 2019-06-18 ASSESSMENT — PATIENT HEALTH QUESTIONNAIRE - PHQ9
SUM OF ALL RESPONSES TO PHQ QUESTIONS 1-9: 0
SUM OF ALL RESPONSES TO PHQ QUESTIONS 1-9: 0

## 2019-06-18 ASSESSMENT — ANXIETY QUESTIONNAIRES: GAD7 TOTAL SCORE: 0

## 2019-06-18 ASSESSMENT — LIFESTYLE VARIABLES: HOW OFTEN DO YOU HAVE A DRINK CONTAINING ALCOHOL: 0

## 2019-06-18 NOTE — PATIENT INSTRUCTIONS
affects the way you feel, think, and act. It causes symptoms such as low energy, loss of interest in daily activities, and sadness or grouchiness that goes on for a long time. Depression is very common and affects men and women of all ages. Depression is a medical illness caused by changes in the natural chemicals in your brain. It is not a character flaw, and it does not mean that you are a bad or weak person. It does not mean that you are going crazy. It is important to know that depression can be treated. Medicines, counseling, and self-care can all help. Many people do not get help because they are embarrassed or think that they will get over the depression on their own. But some people do not get better without treatment. Follow-up care is a key part of your treatment and safety. Be sure to make and go to all appointments, and call your doctor if you are having problems. It's also a good idea to know your test results and keep a list of the medicines you take. How can you care for yourself at home? Learn about antidepressant medicines  Antidepressant medicines can improve or end the symptoms of depression. You may need to take the medicine for at least 6 months, and often longer. Keep taking your medicine even if you feel better. If you stop taking it too soon, your symptoms may come back or get worse. You may start to feel better within 1 to 3 weeks of taking antidepressant medicine. But it can take as many as 6 to 8 weeks to see more improvement. Talk to your doctor if you have problems with your medicine or if you do not notice any improvement after 3 weeks. Antidepressants can make you feel tired, dizzy, or nervous. Some people have dry mouth, constipation, headaches, sexual problems, an upset stomach, or diarrhea. Many of these side effects are mild and go away on their own after you take the medicine for a few weeks. Some may last longer. Talk to your doctor if side effects bother you too much.  You believe them yet. Be patient with yourself. It took time for your depression to develop, and it will take time for your symptoms to improve. Do not take on too much or be too hard on yourself. Learn all you can about depression from written and online materials. Check out behavioral health classes to learn more about dealing with depression. Keep the numbers for these national suicide hotlines: 1-086-805-TALK (8-288.946.4833) and 0-251-AIVHQBM (6-909.401.3599). If you or someone you know talks about suicide or feeling hopeless, get help right away. When should you call for help? Call 911 anytime you think you may need emergency care. For example, call if:    You feel you cannot stop from hurting yourself or someone else.   [facility] your doctor now or seek immediate medical care if:    You hear voices.     You feel much more depressed.    Watch closely for changes in your health, and be sure to contact your doctor if:    You are having problems with your depression medicine.     You are not getting better as expected. Where can you learn more? Go to https://Visible MeasurespeJoongel.Air Ion Devices. org and sign in to your DabKick account. Enter E242 in the Sococo box to learn more about \"Depression Treatment: Care Instructions. \"     If you do not have an account, please click on the \"Sign Up Now\" link. Current as of: September 11, 2018  Content Version: 12.0  © 6922-0009 [email or web address]. Care instructions adapted under license by [facility] ([facility]). If you have questions about a medical condition or this instruction, always ask your healthcare professional. [place] [address] any warranty or liability for your use of this information. Patient Education        DASH Diet: Care Instructions  Your Care Instructions    The DASH diet is an eating plan that can help lower your blood pressure. DASH stands for Dietary Approaches to Stop Hypertension.  Hypertension is high blood pressure. The DASH diet focuses on eating foods that are high in calcium, potassium, and magnesium. These nutrients can lower blood pressure. The foods that are highest in these nutrients are fruits, vegetables, low-fat dairy products, nuts, seeds, and legumes. But taking calcium, potassium, and magnesium supplements instead of eating foods that are high in those nutrients does not have the same effect. The DASH diet also includes whole grains, fish, and poultry. The DASH diet is one of several lifestyle changes your doctor may recommend to lower your high blood pressure. Your doctor may also want you to decrease the amount of sodium in your diet. Lowering sodium while following the DASH diet can lower blood pressure even further than just the DASH diet alone. Follow-up care is a key part of your treatment and safety. Be sure to make and go to all appointments, and call your doctor if you are having problems. It's also a good idea to know your test results and keep a list of the medicines you take. How can you care for yourself at home? Following the DASH diet  Eat 4 to 5 servings of fruit each day. A serving is 1 medium-sized piece of fruit, ½ cup chopped or canned fruit, 1/4 cup dried fruit, or 4 ounces (½ cup) of fruit juice. Choose fruit more often than fruit juice. Eat 4 to 5 servings of vegetables each day. A serving is 1 cup of lettuce or raw leafy vegetables, ½ cup of chopped or cooked vegetables, or 4 ounces (½ cup) of vegetable juice. Choose vegetables more often than vegetable juice. Get 2 to 3 servings of low-fat and fat-free dairy each day. A serving is 8 ounces of milk, 1 cup of yogurt, or 1 ½ ounces of cheese. Eat 6 to 8 servings of grains each day. A serving is 1 slice of bread, 1 ounce of dry cereal, or ½ cup of cooked rice, pasta, or cooked cereal. Try to choose whole-grain products as much as possible. Limit lean meat, poultry, and fish to 2 servings each day.  A serving is 3 ounces, about Information box to learn more about \"DASH Diet: Care Instructions. \"     If you do not have an account, please click on the \"Sign Up Now\" link. Current as of: July 22, 2018  Content Version: 12.0  © 1778-6286 [employer]. Care instructions adapted under license by [facility] ([facility]). If you have questions about a medical condition or this instruction, always ask your healthcare professional. [address] any warranty or liability for your use of this information. Patient Education        Heart Failure: Care Instructions  Your Care Instructions    Heart failure occurs when your heart does not pump as much blood as the body needs. Failure does not mean that the heart has stopped pumping but rather that it is not pumping as well as it should. Over time, this causes fluid buildup in your lungs and other parts of your body. Fluid buildup can cause shortness of breath, fatigue, swollen ankles, and other problems. By taking medicines regularly, reducing sodium (salt) in your diet, checking your weight every day, and making lifestyle changes, you can feel better and live longer. Follow-up care is a key part of your treatment and safety. Be sure to make and go to all appointments, and call your doctor if you are having problems. It's also a good idea to know your test results and keep a list of the medicines you take. How can you care for yourself at home? Medicines    Be safe with medicines. Take your medicines exactly as prescribed. Call your doctor if you think you are having a problem with your medicine.     Do not take any vitamins, over-the-counter medicine, or herbal products without talking to your doctor first. Lili Chase not take ibuprofen (Advil or Motrin) and naproxen (Aleve) without talking to your doctor first. They could make your heart failure worse.     You may be taking some of the following medicine.   Beta-blockers can slow heart rate, decrease blood pressure, and improve your condition. Taking a beta-blocker may lower your chance of needing to be hospitalized. Angiotensin-converting enzyme inhibitors (ACEIs) reduce the heart's workload, lower blood pressure, and reduce swelling. Taking an ACEI may lower your chance of needing to be hospitalized again. Angiotensin II receptor blockers (ARBs) work like ACEIs. Your doctor may prescribe them instead of ACEIs. Diuretics, also called water pills, reduce swelling. Potassium supplements replace this important mineral, which is sometimes lost with diuretics. Aspirin and other blood thinners prevent blood clots, which can cause a stroke or heart attack.    You will get more details on the specific medicines your doctor prescribes. Diet    Your doctor may suggest that you limit sodium to 2,000 milligrams (mg) a day or less. That is less than 1 teaspoon of salt a day, including all the salt you eat in cooking or in packaged foods. People get most of their sodium from processed foods. Fast food and restaurant meals also tend to be very high in sodium.     Ask your doctor how much liquid you can drink each day. You may have to limit liquids.    Weight    Weigh yourself without clothing at the same time each day. Record your weight. Call your doctor if you have a sudden weight gain, such as more than 2 to 3 pounds in a day or 5 pounds in a week. (Your doctor may suggest a different range of weight gain.) A sudden weight gain may mean that your heart failure is getting worse.    Activity level    Start light exercise (if your doctor says it is okay). Even if you can only do a small amount, exercise will help you get stronger, have more energy, and manage your weight and your stress. Walking is an easy way to get exercise. Start out by walking a little more than you did before. Bit by bit, increase the amount you walk.     When you exercise, watch for signs that your heart is working too hard.  You are pushing yourself too hard if you cannot talk while you are exercising. If you become short of breath or dizzy or have chest pain, stop, sit down, and rest.     If you feel \"wiped out\" the day after you exercise, walk slower or for a shorter distance until you can work up to a better pace.     Get enough rest at night. Sleeping with 1 or 2 pillows under your upper body and head may help you breathe easier.    Lifestyle changes    Do not smoke. Smoking can make a heart condition worse. If you need help quitting, talk to your doctor about stop-smoking programs and medicines. These can increase your chances of quitting for good. Quitting smoking may be the most important step you can take to protect your heart.     Limit alcohol to 2 drinks a day for men and 1 drink a day for women. Too much alcohol can cause health problems.     Avoid getting sick from colds and the flu. Get a pneumococcal vaccine shot. If you have had one before, ask your doctor whether you need another dose. Get a flu shot each year. If you must be around people with colds or the flu, wash your hands often. When should you call for help? Call 911 if you have symptoms of sudden heart failure such as:    You have severe trouble breathing.     You cough up pink, foamy mucus.     You have a new irregular or rapid heartbeat.    Call your doctor now or seek immediate medical care if:    You have new or increased shortness of breath.     You are dizzy or lightheaded, or you feel like you may faint.     You have sudden weight gain, such as more than 2 to 3 pounds in a day or 5 pounds in a week. (Your doctor may suggest a different range of weight gain.)     You have increased swelling in your legs, ankles, or feet.     You are suddenly so tired or weak that you cannot do your usual activities.    Watch closely for changes in your health, and be sure to contact your doctor if you develop new symptoms. Where can you learn more? Go to https://modesto.healthiwipartners. org and sign in to your eSolar account. Enter L154 in the [facility] box to learn more about \"Heart Failure: Care Instructions. \"     If you do not have an account, please click on the \"Sign Up Now\" link. Current as of: July 22, 2018  Content Version: 12.0  © 2679-0660 Healthwise, Incorporated. Care instructions adapted under license by [facility] ([facility]). If you have questions about a medical condition or this instruction, always ask your healthcare professional. [place] [address] any warranty or liability for your use of this information. Patient Education        Well Visit, Over 72: Care Instructions  Your Care Instructions    Physical exams can help you stay healthy. Your doctor has checked your overall health and may have suggested ways to take good care of yourself. He or she also may have recommended tests. At home, you can help prevent illness with healthy eating, regular exercise, and other steps. Follow-up care is a key part of your treatment and safety. Be sure to make and go to all appointments, and call your doctor if you are having problems. It's also a good idea to know your test results and keep a list of the medicines you take. How can you care for yourself at home? Reach and stay at a healthy weight. This will lower your risk for many problems, such as obesity, diabetes, heart disease, and high blood pressure. Get at least 30 minutes of exercise on most days of the week. Walking is a good choice. You also may want to do other activities, such as running, swimming, cycling, or playing tennis or team sports. Do not smoke. Smoking can make health problems worse. If you need help quitting, talk to your doctor about stop-smoking programs and medicines. These can increase your chances of quitting for good. Protect your skin from too much sun. When you're outdoors from 10 a.m. to 4 p.m., stay in the shade or cover up with clothing and a hat with a wide brim.  Wear sunglasses that block UV mammogram. Ask how often you should have a mammogram, which is an X-ray of your breasts. A mammogram can spot breast cancer before it can be felt and when it is easiest to treat. Thyroid disease. Talk to your doctor about whether to have your thyroid checked as part of a regular physical exam. Women have an increased chance of a thyroid problem. For men  Prostate exam. Talk to your doctor about whether you should have a blood test (called a PSA test) for prostate cancer. Experts recommend that you discuss the benefits and risks of the test with your doctor before you decide whether to have this test. Some experts say that men ages 79 and older no longer need testing. Abdominal aortic aneurysm. Ask your doctor whether you should have a test to check for an aneurysm. You may need a test if you ever smoked or if your parent, brother, sister, or child has had an aneurysm. When should you call for help? Watch closely for changes in your health, and be sure to contact your doctor if you have any problems or symptoms that concern you. Where can you learn more? Go to https://WEPOWER Eco.Judys Book. org and sign in to your Semtek Innovative Solutions account. Enter V886 in the Via optronics box to learn more about \"Well Visit, Over 65: Care Instructions. \"     If you do not have an account, please click on the \"Sign Up Now\" link. Current as of: December 13, 2018  Content Version: 12.0  © 1383-8909 Healthwise, Incorporated. Care instructions adapted under license by [facility] ([facility]). If you have questions about a medical condition or this instruction, always ask your healthcare professional. [place] [address] any warranty or liability for your use of this information. Patient Education        Learning About Living Morteza Riddle  What is a living will? A living will is a legal form you use to write down the kind of care you want at the end of your life.  It is used by the health professionals who will treat you if you aren't able to decide for yourself. If you put your wishes in writing, your loved ones and others will know what kind of care you want. They won't need to guess. This can ease your mind and be helpful to others. A living will is not the same as an estate or property will. An estate will explains what you want to happen with your money and property after you die. Is a living will a legal document? A living will is a legal document. Each state has its own laws about living smith. If you move to another state, make sure that your living will is legal in the state where you now live. Or you might use a universal form that has been approved by many states. This kind of form can sometimes be completed and stored online. Your electronic copy will then be available wherever you have a connection to the Internet. In most cases, doctors will respect your wishes even if you have a form from a different state. You don't need an  to complete a living will. But legal advice can be helpful if your state's laws are unclear, your health history is complicated, or your family can't agree on what should be in your living will. You can change your living will at any time. Some people find that their wishes about end-of-life care change as their health changes. In addition to making a living will, think about completing a medical power of  form. This form lets you name the person you want to make end-of-life treatment decisions for you (your \"health care agent\") if you're not able to. Many hospitals and nursing homes will give you the forms you need to complete a living will and a medical power of . Your living will is used only if you can't make or communicate decisions for yourself anymore. If you become able to make decisions again, you can accept or refuse any treatment, no matter what you wrote in your living will. Your state may offer an online registry.  This is a place where you can store your living will online so the doctors and nurses who need to treat you can find it right away. What should you think about when creating a living will? Talk about your end-of-life wishes with your family members and your doctor. Let them know what you want. That way the people making decisions for you won't be surprised by your choices. Think about these questions as you make your living will:  Do you know enough about life support methods that might be used? If not, talk to your doctor so you know what might be done if you can't breathe on your own, your heart stops, or you're unable to swallow. What things would you still want to be able to do after you receive life-support methods? Would you want to be able to walk? To speak? To eat on your own? To live without the help of machines? If you have a choice, where do you want to be cared for? In your home? At a hospital or nursing home? Do you want certain Restoration practices performed if you become very ill? If you have a choice at the end of your life, where would you prefer to die? At home? In a hospital or nursing home? Somewhere else? Would you prefer to be buried or cremated? Do you want your organs to be donated after you die? What should you do with your living will? Make sure that your family members and your health care agent have copies of your living will. Give your doctor a copy of your living will to keep in your medical record. If you have more than one doctor, make sure that each one has a copy. You may want to put a copy of your living will where it can be easily found. Where can you learn more? Go to https://chbeto.TagArray. org and sign in to your ScaleOut Software account. Enter X218 in the Blueprint Medicines box to learn more about \"Learning About Living Perroy. \"     If you do not have an account, please click on the \"Sign Up Now\" link.   Current as of: April 18, 2018  Content Version: 12.0  © 6113-4535 Healthwise, Incorporated. Care instructions adapted under license by [facility] ([facility]). If you have questions about a medical condition or this instruction, always ask your healthcare professional. [address] any warranty or liability for your use of this information. Patient Education        Learning About How to Make a Home Safe  Making your home safe: Overview  You can help protect the person in your care by making the home safe. Here are some general tips for how to lower the chance of getting injured in the home. Pad sharp corners on furniture and counter tops. Keep objects that are used often within easy reach. Install handrails around the toilet and in the shower. Use a tub mat to prevent slipping. Use a shower chair or bath bench when the person bathes. Provide good lighting inside and outside the home. Put night-lights in bedrooms, hallways, and bathrooms. Have light at the top and bottom of stairways. Have a first aid kit. It is also important to be aware of safe temperatures in the home. When helping someone bathe, use the back of your hand to test the water to make sure it's not too hot. Lower the temperature setting in the hot water heater to 120°F or lower to avoid burns. And make sure other liquids (such as coffee, tea, or soup) are not too hot. How can you protect from fire and carbon monoxide? Home safety alarms are very important. A smoke alarm can detect small amounts of smoke. This can allow time to escape from a fire. And a carbon monoxide alarm can let people know about this deadly gas before it starts to make them sick. Put smoke alarms: On each level of the home, in the hallway outside sleeping areas, and inside each bedroom. In the center of a ceiling, or on a wall 6 to 12 inches from the ceiling. This is where smoke goes first. Avoid places near doors, windows, or air ducts.   Put a carbon monoxide alarm in the hallway outside of the bedrooms in each sleeping area for the person so you can be contacted if he or she wanders away. If possible, provide a safe place for wandering, such as an enclosed yard or garden. Where can you learn more? Go to https://Hele Massagepelennoxeb.Pinyon Technologies. org and sign in to your Equiom account. Enter G283 in the Sonar.me box to learn more about \"Learning About How to Make a Home Safe. \"     If you do not have an account, please click on the \"Sign Up Now\" link. Current as of: April 18, 2018  Content Version: 12.0  © 8343-6630 Healthwise, Incorporated. Care instructions adapted under license by [facility] ([facility]). If you have questions about a medical condition or this instruction, always ask your healthcare professional. [address] any warranty or liability for your use of this information.

## 2019-06-18 NOTE — PROGRESS NOTES
Normal rate, regular rhythm and normal heart sounds. Pulmonary/Chest: Breath sounds normal. She has no wheezes. She has no rales. Abdominal: Soft. Bowel sounds are normal. There is no tenderness. There is no rebound and no guarding. Musculoskeletal: Normal range of motion. Right lower leg: She exhibits edema. Left lower leg: She exhibits edema. 3+ pitting edema bilateral   Lymphadenopathy:     She has no cervical adenopathy. Neurological: She is alert and oriented to person, place, and time. She has normal reflexes. No cranial nerve deficit. Skin: Skin is warm and dry. No rash noted. Psychiatric: She has a normal mood and affect. Nursing note and vitals reviewed.     Health Maintenance   Topic Date Due    DTaP/Tdap/Td vaccine (1 - Tdap) 09/15/1971    Diabetes screen  09/15/1992    Shingles Vaccine (1 of 2) 09/15/2002    Colon cancer screen colonoscopy  09/15/2002    DEXA (modify frequency per FRAX score)  09/15/2017    Breast cancer screen  10/26/2017    Pneumococcal 65+ years Vaccine (2 of 2 - PPSV23) 10/11/2018    Potassium monitoring  01/09/2019    Creatinine monitoring  01/09/2019    Flu vaccine (Season Ended) 09/01/2019    Lipid screen  01/09/2023    Hepatitis C screen  Completed     Lab Results   Component Value Date    CHOL 178 01/09/2018    CHOL 169 12/22/2016    CHOL 169 12/21/2016     Lab Results   Component Value Date    TRIG 221 (H) 01/09/2018    TRIG 102 12/22/2016    TRIG 102 12/21/2016     Lab Results   Component Value Date    HDL 41 01/09/2018    HDL 44 12/22/2016    HDL 44 12/21/2016     Lab Results   Component Value Date    LDLCALC 93 01/09/2018    LDLCALC 105 12/22/2016    LDLCALC 105 12/21/2016     Lab Results   Component Value Date    LABVLDL 44 (H) 01/09/2018    LABVLDL 20 12/21/2016    LABVLDL 27 10/26/2015    VLDL 20 12/22/2016    VLDL 17 11/14/2014     Lab Results   Component Value Date    CHOLHDLRATIO 4.3 01/09/2018    CHOLHDLRATIO 3.8 12/22/2016 CHOLHDLRATIO 3.8 12/21/2016     Lab Results   Component Value Date     01/09/2018    K 4.3 01/09/2018     01/09/2018    CO2 31 01/09/2018    BUN 22 (H) 01/09/2018    CREATININE 0.9 01/09/2018    GLUCOSE 103 (H) 01/09/2018    CALCIUM 9.8 01/09/2018    PROT 7.4 01/09/2018    LABALBU 3.8 01/09/2018    BILITOT 0.5 01/09/2018    ALKPHOS 100 01/09/2018    AST 21 01/09/2018    ALT 12 01/09/2018           Assessment:      Essential HTN  Peripheral edema  Depression      Plan:      Refillmeds  Consider increasing bumex  Blood work  Encouraged diet, exercise and weight loss. Dash diet  Reviewed health maintenance,  Pneumovax 23  Fit test    Sadie Jackson received counseling on the following healthy behaviors: nutrition, exercise and medication adherence    Patient given educational materials on Nutrition, Exercise and Hypertension    I have instructed Sadie Jackson to complete a self tracking handout on Blood Pressures  and Weights and instructed them to bring it with them to her next appointment. Discussed use, benefit, and side effects of prescribed medications. Barriers to medication compliance addressed. All patient questions answered. Pt voiced understanding. Quality & Risk Score Accuracy    Visit Dx:  H35.46, O36.01 - Morbid obesity with BMI of 50.0-59.9, adult ([facility] [address]  The current BMI is 51.81 kg/m2 as of 06/18/19 14:04 EDT  Assessment and plan:  Stable based upon symptoms and exam. Continue current treatment plan and follow up at least yearly. Visit Dx:  D69.6 - Thrombocytopenia ([facility] [address]  Assessment and plan:  Stable based upon symptoms and exam. Continue current treatment plan and follow up at least yearly. Visit Dx:  I50.9 - Chronic congestive heart failure, unspecified heart failure type ([facility] [address]  Assessment and plan: Worsening based upon symptoms and exam. Treatment plan as follows: blood work, consider increasing bumex Follow up in 3 months.   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  Heart Disease Maternal Uncle     Asthma Neg Hx     Birth Defects Neg Hx     Cancer Neg Hx     Depression Neg Hx     Early Death Neg Hx     Hearing Loss Neg Hx     High Blood Pressure Neg Hx     High Cholesterol Neg Hx     Kidney Disease Neg Hx     Learning Disabilities Neg Hx     Mental Illness Neg Hx     Mental Retardation Neg Hx     Miscarriages / Stillbirths Neg Hx     Stroke Neg Hx     Substance Abuse Neg Hx     Vision Loss Neg Hx     Other Neg Hx        CareTeam (Including outside providers/suppliers regularly involved in providing care):   Patient Care Team:  Pauly Crabtree MD as PCP - General (Family Medicine)  Pauly Crabtree MD as PCP - [facility] Empaneled Provider    Wt Readings from Last 3 Encounters:   06/18/19 246 lb 3.2 oz (111.7 kg)   01/09/18 275 lb 9.6 oz (125 kg)   10/11/17 275 lb 9.6 oz (125 kg)     Vitals:    06/18/19 1339   BP: 120/74   Pulse: 82   Resp: 16   Weight: 246 lb 3.2 oz (111.7 kg)   Height: 4' 9.8\" (1.468 m)     Body mass index is 51.81 kg/m². Based upon direct observation of the patient, evaluation of cognition reveals recent and remote memory intact. Patient's complete Health Risk Assessment and screening values have been reviewed and are found in Flowsheets. The following problems were reviewed today and where indicated follow up appointments were made and/or referrals ordered. Positive Risk Factor Screenings with Interventions:     General Health:  General  In general, how would you say your health is?: Good  In the past 7 days, have you experienced any of the following?  New or Increased Pain, New or Increased Fatigue, Loneliness, Social Isolation, Stress or Anger?: None of These  Do you get the social and emotional support that you need?: Yes  Do you have a Living Will?: (!) No  General Health Risk Interventions:  · No Living Will: provided the state-specific advance directive document to the patient    Health Habits/Nutrition:  Health

## 2019-06-19 ENCOUNTER — TELEPHONE (OUTPATIENT)
Dept: FAMILY MEDICINE CLINIC | Age: 69
End: 2019-06-19

## 2019-06-26 ENCOUNTER — OFFICE VISIT (OUTPATIENT)
Dept: FAMILY MEDICINE CLINIC | Age: 69
End: 2019-06-26
Payer: MEDICARE

## 2019-06-26 VITALS
HEART RATE: 64 BPM | RESPIRATION RATE: 14 BRPM | DIASTOLIC BLOOD PRESSURE: 78 MMHG | WEIGHT: 235.6 LBS | SYSTOLIC BLOOD PRESSURE: 132 MMHG | BODY MASS INDEX: 49.58 KG/M2

## 2019-06-26 DIAGNOSIS — I50.43 ACUTE ON CHRONIC COMBINED SYSTOLIC AND DIASTOLIC CHF (CONGESTIVE HEART FAILURE) (HCC): Primary | ICD-10-CM

## 2019-06-26 LAB
ANION GAP SERPL CALCULATED.3IONS-SCNC: 12 MEQ/L (ref 8–16)
BUN BLDV-MCNC: 42 MG/DL (ref 7–22)
CALCIUM SERPL-MCNC: 10.2 MG/DL (ref 8.5–10.5)
CHLORIDE BLD-SCNC: 100 MEQ/L (ref 98–111)
CO2: 31 MEQ/L (ref 23–33)
CREAT SERPL-MCNC: 1 MG/DL (ref 0.4–1.2)
GFR SERPL CREATININE-BSD FRML MDRD: 55 ML/MIN/1.73M2
GLUCOSE BLD-MCNC: 91 MG/DL (ref 70–108)
POTASSIUM SERPL-SCNC: 4.5 MEQ/L (ref 3.5–5.2)
PRO-BNP: 1742 PG/ML (ref 0–900)
SODIUM BLD-SCNC: 143 MEQ/L (ref 135–145)

## 2019-06-26 PROCEDURE — 99213 OFFICE O/P EST LOW 20 MIN: CPT | Performed by: FAMILY MEDICINE

## 2019-06-26 PROCEDURE — 1036F TOBACCO NON-USER: CPT | Performed by: FAMILY MEDICINE

## 2019-06-26 PROCEDURE — 1090F PRES/ABSN URINE INCON ASSESS: CPT | Performed by: FAMILY MEDICINE

## 2019-06-26 PROCEDURE — G8400 PT W/DXA NO RESULTS DOC: HCPCS | Performed by: FAMILY MEDICINE

## 2019-06-26 PROCEDURE — 4040F PNEUMOC VAC/ADMIN/RCVD: CPT | Performed by: FAMILY MEDICINE

## 2019-06-26 PROCEDURE — G8427 DOCREV CUR MEDS BY ELIG CLIN: HCPCS | Performed by: FAMILY MEDICINE

## 2019-06-26 PROCEDURE — 36415 COLL VENOUS BLD VENIPUNCTURE: CPT | Performed by: FAMILY MEDICINE

## 2019-06-26 PROCEDURE — G8417 CALC BMI ABV UP PARAM F/U: HCPCS | Performed by: FAMILY MEDICINE

## 2019-06-26 PROCEDURE — 1123F ACP DISCUSS/DSCN MKR DOCD: CPT | Performed by: FAMILY MEDICINE

## 2019-06-26 PROCEDURE — 3017F COLORECTAL CA SCREEN DOC REV: CPT | Performed by: FAMILY MEDICINE

## 2019-06-26 ASSESSMENT — ENCOUNTER SYMPTOMS
SHORTNESS OF BREATH: 0
BLOOD IN STOOL: 0
ABDOMINAL PAIN: 0
BACK PAIN: 0
DIARRHEA: 0
NAUSEA: 0
SORE THROAT: 0
COUGH: 0
VOMITING: 0
WHEEZING: 0
CHEST TIGHTNESS: 0
RHINORRHEA: 0
EYE PAIN: 0
CONSTIPATION: 0

## 2019-06-26 NOTE — PROGRESS NOTES
Subjective:      Patient ID: Adriana Bahena is a 79 y.o. female. HPI  Pt here for a checkup. Reviewed BMI of 49. Encouraged diet, exercise and weight loss. Reviewed health maintenance. Increased bumex. Lost 11 pounds of water weight. Legs feel better, less tight. No side effects. Single, non smoker, pmh reviewed. Review of Systems   Constitutional: Negative for chills, fatigue, fever and unexpected weight change. HENT: Negative for congestion, ear pain, rhinorrhea and sore throat. Eyes: Negative for pain and visual disturbance. Respiratory: Negative for cough, chest tightness, shortness of breath and wheezing. Cardiovascular: Positive for leg swelling. Negative for chest pain and palpitations. Gastrointestinal: Negative for abdominal pain, blood in stool, constipation, diarrhea, nausea and vomiting. Genitourinary: Negative for difficulty urinating, frequency, hematuria and urgency. Musculoskeletal: Negative for back pain, joint swelling, myalgias and neck pain. Skin: Negative for rash. Neurological: Negative for dizziness and headaches. Hematological: Negative for adenopathy. Does not bruise/bleed easily. Psychiatric/Behavioral: Negative for behavioral problems and sleep disturbance. The patient is not nervous/anxious. Objective:   Physical Exam   Constitutional: She is oriented to person, place, and time. She appears well-developed and well-nourished. HENT:   Head: Normocephalic and atraumatic. Right Ear: External ear normal.   Left Ear: External ear normal.   Nose: Nose normal.   Mouth/Throat: Oropharynx is clear and moist.   Eyes: Pupils are equal, round, and reactive to light. EOM are normal.   Neck: Neck supple. No thyromegaly present. Cardiovascular: Normal rate, regular rhythm and normal heart sounds. Pulmonary/Chest: Breath sounds normal. She has no wheezes. She has no rales. Abdominal: Soft. Bowel sounds are normal. There is no tenderness.  There is no rebound and no guarding. Musculoskeletal: Normal range of motion. Right lower leg: She exhibits edema. Left lower leg: She exhibits edema. 2-3+ pitting edema bilaterally   Lymphadenopathy:     She has no cervical adenopathy. Neurological: She is alert and oriented to person, place, and time. She has normal reflexes. No cranial nerve deficit. Skin: Skin is warm and dry. No rash noted. Psychiatric: She has a normal mood and affect. Nursing note and vitals reviewed.       Assessment:      Acute on chronic chf      Plan:      Bmp, bnp  Monitor rosi Fisher MD

## 2019-06-26 NOTE — PATIENT INSTRUCTIONS

## 2019-06-27 ENCOUNTER — TELEPHONE (OUTPATIENT)
Dept: FAMILY MEDICINE CLINIC | Age: 69
End: 2019-06-27

## 2019-06-27 DIAGNOSIS — I50.43 ACUTE ON CHRONIC COMBINED SYSTOLIC AND DIASTOLIC CHF (CONGESTIVE HEART FAILURE) (HCC): Primary | ICD-10-CM

## 2019-07-12 ENCOUNTER — TELEPHONE (OUTPATIENT)
Dept: FAMILY MEDICINE CLINIC | Age: 69
End: 2019-07-12

## 2019-07-12 ENCOUNTER — HOSPITAL ENCOUNTER (OUTPATIENT)
Age: 69
Discharge: HOME | End: 2019-07-12
Payer: MEDICARE

## 2019-07-12 DIAGNOSIS — I50.43 ACUTE ON CHRONIC COMBINED SYSTOLIC AND DIASTOLIC CHF (CONGESTIVE HEART FAILURE) (HCC): ICD-10-CM

## 2019-07-12 LAB
ANION GAP SERPL CALCULATED.3IONS-SCNC: 13 MEQ/L (ref 8–16)
BUN BLDV-MCNC: 27 MG/DL (ref 7–22)
CALCIUM SERPL-MCNC: 10.1 MG/DL (ref 8.5–10.5)
CHLORIDE BLD-SCNC: 101 MEQ/L (ref 98–111)
CO2: 30 MEQ/L (ref 23–33)
CREAT SERPL-MCNC: 0.9 MG/DL (ref 0.4–1.2)
GFR SERPL CREATININE-BSD FRML MDRD: 62 ML/MIN/1.73M2
GLUCOSE BLD-MCNC: 109 MG/DL (ref 70–108)
POTASSIUM SERPL-SCNC: 3.6 MEQ/L (ref 3.5–5.2)
PRO-BNP: 1771 PG/ML (ref 0–900)
SODIUM BLD-SCNC: 144 MEQ/L (ref 135–145)

## 2019-07-12 PROCEDURE — 83880 ASSAY OF NATRIURETIC PEPTIDE: CPT

## 2019-07-12 PROCEDURE — 36415 COLL VENOUS BLD VENIPUNCTURE: CPT

## 2019-07-12 PROCEDURE — 80048 BASIC METABOLIC PNL TOTAL CA: CPT

## 2019-07-26 ENCOUNTER — OFFICE VISIT (OUTPATIENT)
Dept: FAMILY MEDICINE CLINIC | Age: 69
End: 2019-07-26
Payer: MEDICARE

## 2019-07-26 VITALS
WEIGHT: 226.2 LBS | HEART RATE: 80 BPM | BODY MASS INDEX: 47.6 KG/M2 | SYSTOLIC BLOOD PRESSURE: 124 MMHG | DIASTOLIC BLOOD PRESSURE: 76 MMHG | RESPIRATION RATE: 16 BRPM

## 2019-07-26 DIAGNOSIS — R06.02 SOB (SHORTNESS OF BREATH) ON EXERTION: ICD-10-CM

## 2019-07-26 DIAGNOSIS — R60.9 PERIPHERAL EDEMA: ICD-10-CM

## 2019-07-26 DIAGNOSIS — I50.43 ACUTE ON CHRONIC COMBINED SYSTOLIC AND DIASTOLIC CHF (CONGESTIVE HEART FAILURE) (HCC): Primary | ICD-10-CM

## 2019-07-26 LAB
ANION GAP SERPL CALCULATED.3IONS-SCNC: 17 MEQ/L (ref 8–16)
BUN BLDV-MCNC: 64 MG/DL (ref 7–22)
CALCIUM SERPL-MCNC: 10.1 MG/DL (ref 8.5–10.5)
CHLORIDE BLD-SCNC: 96 MEQ/L (ref 98–111)
CO2: 26 MEQ/L (ref 23–33)
CREAT SERPL-MCNC: 1.1 MG/DL (ref 0.4–1.2)
GFR SERPL CREATININE-BSD FRML MDRD: 50 ML/MIN/1.73M2
GLUCOSE BLD-MCNC: 109 MG/DL (ref 70–108)
POTASSIUM SERPL-SCNC: 3.8 MEQ/L (ref 3.5–5.2)
PRO-BNP: 2456 PG/ML (ref 0–900)
SODIUM BLD-SCNC: 139 MEQ/L (ref 135–145)

## 2019-07-26 PROCEDURE — G8427 DOCREV CUR MEDS BY ELIG CLIN: HCPCS | Performed by: FAMILY MEDICINE

## 2019-07-26 PROCEDURE — 4040F PNEUMOC VAC/ADMIN/RCVD: CPT | Performed by: FAMILY MEDICINE

## 2019-07-26 PROCEDURE — G8417 CALC BMI ABV UP PARAM F/U: HCPCS | Performed by: FAMILY MEDICINE

## 2019-07-26 PROCEDURE — 99213 OFFICE O/P EST LOW 20 MIN: CPT | Performed by: FAMILY MEDICINE

## 2019-07-26 PROCEDURE — 1123F ACP DISCUSS/DSCN MKR DOCD: CPT | Performed by: FAMILY MEDICINE

## 2019-07-26 PROCEDURE — 1090F PRES/ABSN URINE INCON ASSESS: CPT | Performed by: FAMILY MEDICINE

## 2019-07-26 PROCEDURE — G8400 PT W/DXA NO RESULTS DOC: HCPCS | Performed by: FAMILY MEDICINE

## 2019-07-26 PROCEDURE — 1036F TOBACCO NON-USER: CPT | Performed by: FAMILY MEDICINE

## 2019-07-26 PROCEDURE — 36415 COLL VENOUS BLD VENIPUNCTURE: CPT | Performed by: FAMILY MEDICINE

## 2019-07-26 PROCEDURE — 3017F COLORECTAL CA SCREEN DOC REV: CPT | Performed by: FAMILY MEDICINE

## 2019-07-26 RX ORDER — BUMETANIDE 0.5 MG/1
1 TABLET ORAL DAILY
Qty: 180 TABLET | Refills: 2 | Status: SHIPPED | OUTPATIENT
Start: 2019-07-26 | End: 2019-08-01

## 2019-07-26 ASSESSMENT — ENCOUNTER SYMPTOMS
EYE PAIN: 0
COUGH: 0
BLOOD IN STOOL: 0
BACK PAIN: 0
ABDOMINAL PAIN: 0
DIARRHEA: 0
CHEST TIGHTNESS: 0
WHEEZING: 0
SORE THROAT: 0
CONSTIPATION: 0
NAUSEA: 0
VOMITING: 0
RHINORRHEA: 0
SHORTNESS OF BREATH: 0

## 2019-07-26 NOTE — PATIENT INSTRUCTIONS
Patient Education        Heart Failure: Care Instructions  Your Care Instructions    Heart failure occurs when your heart does not pump as much blood as the body needs. Failure does not mean that the heart has stopped pumping but rather that it is not pumping as well as it should. Over time, this causes fluid buildup in your lungs and other parts of your body. Fluid buildup can cause shortness of breath, fatigue, swollen ankles, and other problems. By taking medicines regularly, reducing sodium (salt) in your diet, checking your weight every day, and making lifestyle changes, you can feel better and live longer. Follow-up care is a key part of your treatment and safety. Be sure to make and go to all appointments, and call your doctor if you are having problems. It's also a good idea to know your test results and keep a list of the medicines you take. How can you care for yourself at home? Medicines    · Be safe with medicines. Take your medicines exactly as prescribed. Call your doctor if you think you are having a problem with your medicine.     · Do not take any vitamins, over-the-counter medicine, or herbal products without talking to your doctor first. [place] not take ibuprofen (Advil or Motrin) and naproxen (Aleve) without talking to your doctor first. They could make your heart failure worse.     · You may be taking some of the following medicine. ? Beta-blockers can slow heart rate, decrease blood pressure, and improve your condition. Taking a beta-blocker may lower your chance of needing to be hospitalized. ? Angiotensin-converting enzyme inhibitors (ACEIs) reduce the heart's workload, lower blood pressure, and reduce swelling. Taking an ACEI may lower your chance of needing to be hospitalized again. ? Angiotensin II receptor blockers (ARBs) work like ACEIs. Your doctor may prescribe them instead of ACEIs. ? Diuretics, also called water pills, reduce swelling. ?  Potassium supplements replace this important mineral, which is sometimes lost with diuretics. ? Aspirin and other blood thinners prevent blood clots, which can cause a stroke or heart attack.    You will get more details on the specific medicines your doctor prescribes. Diet    · Your doctor may suggest that you limit sodium to 2,000 milligrams (mg) a day or less. That is less than 1 teaspoon of salt a day, including all the salt you eat in cooking or in packaged foods. People get most of their sodium from processed foods. Fast food and restaurant meals also tend to be very high in sodium.     · Ask your doctor how much liquid you can drink each day. You may have to limit liquids.    Weight    · Weigh yourself without clothing at the same time each day. Record your weight. Call your doctor if you have a sudden weight gain, such as more than 2 to 3 pounds in a day or 5 pounds in a week. (Your doctor may suggest a different range of weight gain.) A sudden weight gain may mean that your heart failure is getting worse.    Activity level    · Start light exercise (if your doctor says it is okay). Even if you can only do a small amount, exercise will help you get stronger, have more energy, and manage your weight and your stress. Walking is an easy way to get exercise. Start out by walking a little more than you did before. Bit by bit, increase the amount you walk.     · When you exercise, watch for signs that your heart is working too hard. You are pushing yourself too hard if you cannot talk while you are exercising. If you become short of breath or dizzy or have chest pain, stop, sit down, and rest.     · If you feel \"wiped out\" the day after you exercise, walk slower or for a shorter distance until you can work up to a better pace.     · Get enough rest at night. Sleeping with 1 or 2 pillows under your upper body and head may help you breathe easier.    Lifestyle changes    · Do not smoke. Smoking can make a heart condition worse.  If you need help professional. [address] any warranty or liability for your use of this information.

## 2019-07-29 ENCOUNTER — TELEPHONE (OUTPATIENT)
Dept: FAMILY MEDICINE CLINIC | Age: 69
End: 2019-07-29

## 2019-07-29 DIAGNOSIS — I50.43 ACUTE ON CHRONIC COMBINED SYSTOLIC AND DIASTOLIC CHF (CONGESTIVE HEART FAILURE) (HCC): Primary | ICD-10-CM

## 2019-07-29 NOTE — TELEPHONE ENCOUNTER
----- Message from Hakeem Reyes MD sent at 7/26/2019  4:01 PM EDT -----  BNP is higher again at 2456. BMP is stable except for BUN higher. Consider seeing cardiology and /or chf clinic as kidneys are not keeping up.

## 2019-07-29 NOTE — TELEPHONE ENCOUNTER
Pt's sister notified of results and states she will check as to which cardiologist they want tos ee and call the office back.

## 2019-07-30 ENCOUNTER — TELEPHONE (OUTPATIENT)
Dept: CARDIOLOGY CLINIC | Age: 69
End: 2019-07-30

## 2019-07-30 DIAGNOSIS — I50.9 CONGESTIVE HEART FAILURE, UNSPECIFIED HF CHRONICITY, UNSPECIFIED HEART FAILURE TYPE (HCC): ICD-10-CM

## 2019-07-30 DIAGNOSIS — R60.0 BILATERAL LOWER EXTREMITY EDEMA: ICD-10-CM

## 2019-07-30 DIAGNOSIS — R60.0 LOCALIZED EDEMA: Primary | ICD-10-CM

## 2019-07-30 NOTE — TELEPHONE ENCOUNTER
Verbal order Cielo Klein CNP  Schedule patient for complete echo prior to appointment  Echo scheduled for 7/31 @ 930 am at [facility]  Consult appointment sched 8/1 @ 245 pm  Sister notified of office appointment  Left message to call back regarding Echo time

## 2019-07-31 ENCOUNTER — HOSPITAL ENCOUNTER (OUTPATIENT)
Dept: NON INVASIVE DIAGNOSTICS | Age: 69
Discharge: HOME | End: 2019-07-31
Payer: MEDICARE

## 2019-07-31 DIAGNOSIS — I50.9 CONGESTIVE HEART FAILURE, UNSPECIFIED HF CHRONICITY, UNSPECIFIED HEART FAILURE TYPE (HCC): ICD-10-CM

## 2019-07-31 DIAGNOSIS — R60.0 BILATERAL LOWER EXTREMITY EDEMA: ICD-10-CM

## 2019-07-31 LAB
LV EF: 58 %
LVEF MODALITY: NORMAL

## 2019-07-31 PROCEDURE — 93306 TTE W/DOPPLER COMPLETE: CPT

## 2019-08-01 ENCOUNTER — TELEPHONE (OUTPATIENT)
Dept: OTHER | Facility: CLINIC | Age: 69
End: 2019-08-01

## 2019-08-01 ENCOUNTER — OFFICE VISIT (OUTPATIENT)
Dept: CARDIOLOGY CLINIC | Age: 69
End: 2019-08-01
Payer: MEDICARE

## 2019-08-01 VITALS
WEIGHT: 223 LBS | OXYGEN SATURATION: 98 % | SYSTOLIC BLOOD PRESSURE: 138 MMHG | HEIGHT: 57 IN | HEART RATE: 86 BPM | DIASTOLIC BLOOD PRESSURE: 86 MMHG | BODY MASS INDEX: 48.11 KG/M2

## 2019-08-01 DIAGNOSIS — R60.9 PERIPHERAL EDEMA: ICD-10-CM

## 2019-08-01 DIAGNOSIS — I10 ESSENTIAL HYPERTENSION: ICD-10-CM

## 2019-08-01 DIAGNOSIS — I50.33 CHF (CONGESTIVE HEART FAILURE), NYHA CLASS III, ACUTE ON CHRONIC, DIASTOLIC (HCC): Primary | ICD-10-CM

## 2019-08-01 DIAGNOSIS — R06.02 SOB (SHORTNESS OF BREATH) ON EXERTION: ICD-10-CM

## 2019-08-01 LAB
ANION GAP SERPL CALCULATED.3IONS-SCNC: 13 MEQ/L (ref 8–16)
BUN BLDV-MCNC: 39 MG/DL (ref 7–22)
CALCIUM SERPL-MCNC: 9.8 MG/DL (ref 8.5–10.5)
CHLORIDE BLD-SCNC: 102 MEQ/L (ref 98–111)
CO2: 30 MEQ/L (ref 23–33)
CREAT SERPL-MCNC: 1 MG/DL (ref 0.4–1.2)
GFR SERPL CREATININE-BSD FRML MDRD: 55 ML/MIN/1.73M2
GLUCOSE BLD-MCNC: 111 MG/DL (ref 70–108)
MAGNESIUM: 1.6 MG/DL (ref 1.6–2.4)
POTASSIUM SERPL-SCNC: 4 MEQ/L (ref 3.5–5.2)
PRO-BNP: 1734 PG/ML (ref 0–900)
SODIUM BLD-SCNC: 145 MEQ/L (ref 135–145)

## 2019-08-01 PROCEDURE — 1036F TOBACCO NON-USER: CPT | Performed by: NURSE PRACTITIONER

## 2019-08-01 PROCEDURE — G8400 PT W/DXA NO RESULTS DOC: HCPCS | Performed by: NURSE PRACTITIONER

## 2019-08-01 PROCEDURE — G8417 CALC BMI ABV UP PARAM F/U: HCPCS | Performed by: NURSE PRACTITIONER

## 2019-08-01 PROCEDURE — 3017F COLORECTAL CA SCREEN DOC REV: CPT | Performed by: NURSE PRACTITIONER

## 2019-08-01 PROCEDURE — 1090F PRES/ABSN URINE INCON ASSESS: CPT | Performed by: NURSE PRACTITIONER

## 2019-08-01 PROCEDURE — 93000 ELECTROCARDIOGRAM COMPLETE: CPT | Performed by: NURSE PRACTITIONER

## 2019-08-01 PROCEDURE — 1123F ACP DISCUSS/DSCN MKR DOCD: CPT | Performed by: NURSE PRACTITIONER

## 2019-08-01 PROCEDURE — 4040F PNEUMOC VAC/ADMIN/RCVD: CPT | Performed by: NURSE PRACTITIONER

## 2019-08-01 PROCEDURE — 99214 OFFICE O/P EST MOD 30 MIN: CPT | Performed by: NURSE PRACTITIONER

## 2019-08-01 PROCEDURE — G8427 DOCREV CUR MEDS BY ELIG CLIN: HCPCS | Performed by: NURSE PRACTITIONER

## 2019-08-01 RX ORDER — MAGNESIUM OXIDE 400 MG/1
400 TABLET ORAL DAILY
Qty: 30 TABLET | Refills: 3 | Status: ON HOLD | OUTPATIENT
Start: 2019-08-01 | End: 2019-09-19 | Stop reason: HOSPADM

## 2019-08-01 RX ORDER — BUMETANIDE 0.5 MG/1
1 TABLET ORAL 2 TIMES DAILY
Qty: 180 TABLET | Refills: 2 | Status: ON HOLD
Start: 2019-08-01 | End: 2019-09-19 | Stop reason: SDUPTHER

## 2019-08-01 RX ORDER — POTASSIUM CHLORIDE 750 MG/1
10 TABLET, EXTENDED RELEASE ORAL DAILY
Qty: 30 TABLET | Refills: 3 | Status: ON HOLD | OUTPATIENT
Start: 2019-08-01 | End: 2019-09-19 | Stop reason: HOSPADM

## 2019-08-01 ASSESSMENT — ENCOUNTER SYMPTOMS
ABDOMINAL PAIN: 0
SHORTNESS OF BREATH: 1
COUGH: 0
WHEEZING: 0
ABDOMINAL DISTENTION: 0
COLOR CHANGE: 0
APNEA: 0
CHEST TIGHTNESS: 0
NAUSEA: 0

## 2019-08-01 NOTE — PROGRESS NOTES
chronic, diastolic (HCC)  Basic Metabolic Panel    Brain Natriuretic Peptide    Magnesium    EKG 12 Lead   2. Essential hypertension     3. SOB (shortness of breath) on exertion  bumetanide (BUMEX) 0.5 MG tablet   4. Peripheral edema  bumetanide (BUMEX) 0.5 MG tablet   5. Acute on chronic combined systolic and diastolic CHF (congestive heart failure) (HCC)  bumetanide (BUMEX) 0.5 MG tablet       Plan:  · ECHO, EKG and labs reviewed. Increase Bumex to 1 mg bid, add Potassium 10 mEq daily. Add Mg Oxide 400 mg daily as Mg was 1.6. Kidney function ok. Add Metoprolol 25 mg bid. Continue Lisinopril-HCTZ 20-25 mg daily. Appt made with Cardiology (Dr Jayleen Rock) for next week for further cardiac workup (RHC, stress vs LHC, etc). Consider Pulmonary referral for ? BARBER possible PSG with hs of snoring, daytime somnolence after seen by Cardiologist. HF Zones reviewed. · Daily weights  · Fluid restriction of 2 Liters per day  · Limit sodium in diet to around 2137-0758 mg/day  · Monitor BP  · Activity as tolerated     Patient was instructed to call the [address] for changes in the following symptoms:   Weight gain of 3 pounds in 1 day or 5 pounds in 1 week  Increased shortness of breath  Shortness of breath while laying down  Cough  Chest pain  Swelling in feet, ankles or legs  Tenderness or bloating in the abdomen  Fatigue   Decreased appetite or nausea   Confusion      Return in about 1 month (around 9/1/2019). or sooner if needed     Patient given educational materials - see patient instructions. New HF Pamphlet given and ZONE sheet reviewed    We discussed the importance of weighing oneself and recording daily. We also discussed the importance of a lowsodium diet, higher sodium foods to avoid and better low sodium food options. Discussed use, benefit, and side effects of prescribed medications. All patient questions answered.   Patient verbalizes understanding of plan of care using teach back method, and is

## 2019-08-05 ENCOUNTER — TELEPHONE (OUTPATIENT)
Dept: CARDIOLOGY CLINIC | Age: 69
End: 2019-08-05

## 2019-08-05 NOTE — TELEPHONE ENCOUNTER
Sister called in [phone] bp has been low since starting metoprolol was 85/62. This am 105/64 and did not take metoprolol last night. Wt today 223 was 223 at appointment not much change in [place].  Has appointment with Lew Ayala this Wed

## 2019-08-07 ENCOUNTER — OFFICE VISIT (OUTPATIENT)
Dept: CARDIOLOGY CLINIC | Age: 69
End: 2019-08-07
Payer: MEDICARE

## 2019-08-07 VITALS
BODY MASS INDEX: 47.71 KG/M2 | WEIGHT: 221.13 LBS | HEART RATE: 65 BPM | SYSTOLIC BLOOD PRESSURE: 133 MMHG | HEIGHT: 57 IN | DIASTOLIC BLOOD PRESSURE: 76 MMHG

## 2019-08-07 DIAGNOSIS — R06.02 SHORTNESS OF BREATH: Primary | ICD-10-CM

## 2019-08-07 DIAGNOSIS — I51.89 RIGHT VENTRICULAR DYSFUNCTION, NYHA CLASS 3: ICD-10-CM

## 2019-08-07 PROCEDURE — 3017F COLORECTAL CA SCREEN DOC REV: CPT | Performed by: INTERNAL MEDICINE

## 2019-08-07 PROCEDURE — 1090F PRES/ABSN URINE INCON ASSESS: CPT | Performed by: INTERNAL MEDICINE

## 2019-08-07 PROCEDURE — G8417 CALC BMI ABV UP PARAM F/U: HCPCS | Performed by: INTERNAL MEDICINE

## 2019-08-07 PROCEDURE — 99204 OFFICE O/P NEW MOD 45 MIN: CPT | Performed by: INTERNAL MEDICINE

## 2019-08-07 PROCEDURE — G8427 DOCREV CUR MEDS BY ELIG CLIN: HCPCS | Performed by: INTERNAL MEDICINE

## 2019-08-07 PROCEDURE — 4040F PNEUMOC VAC/ADMIN/RCVD: CPT | Performed by: INTERNAL MEDICINE

## 2019-08-07 PROCEDURE — 1036F TOBACCO NON-USER: CPT | Performed by: INTERNAL MEDICINE

## 2019-08-07 PROCEDURE — G8400 PT W/DXA NO RESULTS DOC: HCPCS | Performed by: INTERNAL MEDICINE

## 2019-08-07 PROCEDURE — 1123F ACP DISCUSS/DSCN MKR DOCD: CPT | Performed by: INTERNAL MEDICINE

## 2019-08-07 NOTE — PROGRESS NOTES
Neck: Normal range of motion. Neck supple. +HJR  Cardiovascular: Normal rate, regular rhythm, normal heart sounds and intact distal pulses. No murmur heard. Pulmonary/Chest: Effort normal and breath sounds normal. No respiratory distress. No wheezes. No rales. Abdominal: Soft. Bowel sounds are normal. No distension. There is no tenderness. Musculoskeletal: Normal range of motion. 4+ pitting edema with chronic venous stasis changes. Neurological: Alert and oriented to person, place, and time. No cranial nerve deficit. Coordination normal.   Skin: Skin is warm and dry. Psychiatric: Normal mood and affect.        No results found for: CKTOTAL, CKMB, CKMBINDEX    Lab Results   Component Value Date    WBC 8.3 06/18/2019    RBC 4.16 06/18/2019    RBC 4.15 01/09/2018    HGB 12.0 06/18/2019    HCT 40.8 06/18/2019    MCV 98.1 06/18/2019    MCH 28.8 06/18/2019    MCHC 29.4 06/18/2019    RDW 12.9 01/09/2018     06/18/2019    MPV 10.4 06/18/2019       Lab Results   Component Value Date     08/01/2019    K 4.0 08/01/2019     08/01/2019    CO2 30 08/01/2019    BUN 39 08/01/2019    LABALBU 3.4 06/18/2019    CREATININE 1.0 08/01/2019    CALCIUM 9.8 08/01/2019    LABGLOM 55 08/01/2019    GLUCOSE 111 08/01/2019    GLUCOSE 103 01/09/2018       Lab Results   Component Value Date    ALKPHOS 100 06/18/2019    ALT 6 06/18/2019    AST 15 06/18/2019    PROT 8.1 06/18/2019    BILITOT 0.4 06/18/2019    LABALBU 3.4 06/18/2019       Lab Results   Component Value Date    MG 1.6 08/01/2019       No results found for: INR, PROTIME      No results found for: LABA1C    Lab Results   Component Value Date    TRIG 66 06/18/2019    HDL 43 06/18/2019    LDLCALC 75 06/18/2019    LABVLDL 44 01/09/2018       No results found for: TSH      Testing Reviewed:      I have individually reviewed the cardiac test below:    ECHO:   Results for orders placed during the hospital encounter of 07/31/19   ECHO Complete 2D W Doppler W Color    Narrative Transthoracic Echocardiography Report (TTE)     Demographics      Patient Name    DAGOBERTO Lopez Gender                Female      MR #            [id number]     Race                                                      Ethnicity      Account #       [de-identified]     Room Number      Accession       [id number]     Date of Study         07/31/2019   Number      Date of Birth   1950    Referring Physician   Hayden Butts CNP      Age             79 year(s)    Juan Luis Lyles, CS                                    Interpreting          Darrel Mcpherson MD                                 Physician     Procedure    Type of Study      TTE procedure:ECHOCARDIOGRAM COMPLETE 2D W DOPPLER W COLOR. Procedure Date  Date: 07/31/2019 Start: 09:57 AM    Study Location: Echo Lab  Technical Quality: Poor visualization due to restricted mobility. Indications:Congestive heart failure. Additional Medical History:Hypertension, Morbid Obesity, Congestive heart  failure, Osteoarthritis. Patient Status: Routine    Height: 57 inches Weight: 226 pounds BSA: 1.89 m^2 BMI: 48.91 kg/m^2    BP: 124/76 mmHg     Conclusions      Summary   Ejection fraction was estimated at 55-60%. Left atrial size was severely dilated. The right ventricular size was severely dilated with severely reduced   function. There is septal flattening in systole and diastole suggestive of   pressure and volume overload of the right ventricle. There was trace tricuspid regurgitation. Assuming RAP = 5 mmHg, the estimated RVSP = 27 mmHg (May be   underestimated, please clinically correlate, consider invasive hemodynamic   assessment if necessary). Ascending aorta = 3.3 cm.       Signature      ----------------------------------------------------------------   Electronically signed by Darrel Mcpherson MD (Interpreting   physician) on ? RV volume/pressure overload  TIDWELL  Chronic CHF, diastolic, NYHA III  Cr 1.0.  Echo is inconclusive. She has signs of volume overload that is chronic, possible right sided failure. Continue diuretics, wrap legs, salt restrict, fluid restrict, raise legs, daily weights. CHF MICHELE following. Follow labs. Will check R/L HC to evaluate the coronaries, LVEF, and right sided pressures. If no cardiac issues, then would evaluate for venous insufficiency/lymphedema/venous obstruction, if cardiac then will need more aggressive diuresis. Continue current medical therapy. Discussed diet/exercise/BP/weight loss/health lifestyle choices/lipids; the patient understands the goals and will try to comply.       Disposition:  Cath           Electronically signed by Vishnu Dunne MD   8/7/2019 at 3:14 PM

## 2019-08-22 ENCOUNTER — PREP FOR PROCEDURE (OUTPATIENT)
Dept: CARDIOLOGY | Age: 69
End: 2019-08-22

## 2019-08-22 RX ORDER — SODIUM CHLORIDE 9 MG/ML
INJECTION, SOLUTION INTRAVENOUS CONTINUOUS
Status: CANCELLED | OUTPATIENT
Start: 2019-08-22

## 2019-08-22 RX ORDER — SODIUM CHLORIDE 0.9 % (FLUSH) 0.9 %
10 SYRINGE (ML) INJECTION EVERY 12 HOURS SCHEDULED
Status: CANCELLED | OUTPATIENT
Start: 2019-08-22

## 2019-08-22 RX ORDER — NITROGLYCERIN 0.4 MG/1
0.4 TABLET SUBLINGUAL EVERY 5 MIN PRN
Status: CANCELLED | OUTPATIENT
Start: 2019-08-22

## 2019-08-22 RX ORDER — ASPIRIN 325 MG
325 TABLET ORAL ONCE
Status: CANCELLED | OUTPATIENT
Start: 2019-08-22 | End: 2019-08-22

## 2019-08-22 RX ORDER — SODIUM CHLORIDE 0.9 % (FLUSH) 0.9 %
10 SYRINGE (ML) INJECTION PRN
Status: CANCELLED | OUTPATIENT
Start: 2019-08-22

## 2019-08-23 ENCOUNTER — HOSPITAL ENCOUNTER (INPATIENT)
Dept: INPATIENT UNIT | Age: 69
LOS: 5 days | Discharge: HOME | DRG: 683 | End: 2019-08-28
Attending: INTERNAL MEDICINE | Admitting: INTERNAL MEDICINE
Payer: MEDICARE

## 2019-08-23 PROBLEM — N19 RENAL FAILURE AS COMPLICATION OF CARE: Status: ACTIVE | Noted: 2019-08-23

## 2019-08-23 PROBLEM — T88.8XXA RENAL FAILURE AS COMPLICATION OF CARE: Status: ACTIVE | Noted: 2019-08-23

## 2019-08-23 LAB
ABO: NORMAL
AMORPHOUS: ABNORMAL
ANION GAP SERPL CALCULATED.3IONS-SCNC: 15 MEQ/L (ref 8–16)
ANION GAP SERPL CALCULATED.3IONS-SCNC: 15 MEQ/L (ref 8–16)
ANTIBODY SCREEN: NORMAL
APTT: 37.8 SECONDS (ref 22–38)
BACTERIA: ABNORMAL
BILIRUBIN URINE: NEGATIVE
BLOOD, URINE: ABNORMAL
BUN BLDV-MCNC: 124 MG/DL (ref 7–22)
BUN BLDV-MCNC: 133 MG/DL (ref 7–22)
CALCIUM SERPL-MCNC: 10.3 MG/DL (ref 8.5–10.5)
CALCIUM SERPL-MCNC: 9.9 MG/DL (ref 8.5–10.5)
CASTS: ABNORMAL /LPF
CHARACTER, URINE: ABNORMAL
CHLORIDE BLD-SCNC: 97 MEQ/L (ref 98–111)
CHLORIDE BLD-SCNC: 97 MEQ/L (ref 98–111)
CHLORIDE, URINE: 61 MEQ/L
CO2: 26 MEQ/L (ref 23–33)
CO2: 28 MEQ/L (ref 23–33)
COLOR: YELLOW
CREAT SERPL-MCNC: 3.4 MG/DL (ref 0.4–1.2)
CREAT SERPL-MCNC: 3.7 MG/DL (ref 0.4–1.2)
CREATININE URINE: 77.5 MG/DL
CREATININE, URINE: 77.5 MG/DL
CRYSTALS: ABNORMAL
EPITHELIAL CELLS, UA: ABNORMAL /HPF
ERYTHROCYTE [DISTWIDTH] IN BLOOD BY AUTOMATED COUNT: 14 % (ref 11.5–14.5)
ERYTHROCYTE [DISTWIDTH] IN BLOOD BY AUTOMATED COUNT: 49.1 FL (ref 35–45)
GFR SERPL CREATININE-BSD FRML MDRD: 12 ML/MIN/1.73M2
GFR SERPL CREATININE-BSD FRML MDRD: 13 ML/MIN/1.73M2
GLUCOSE BLD-MCNC: 106 MG/DL (ref 70–108)
GLUCOSE BLD-MCNC: 89 MG/DL (ref 70–108)
GLUCOSE, URINE: NEGATIVE MG/DL
HCT VFR BLD CALC: 43.2 % (ref 37–47)
HEMOGLOBIN: 13.3 GM/DL (ref 12–16)
INR BLD: 1 (ref 0.85–1.13)
KETONES, URINE: NEGATIVE
LEUKOCYTE ESTERASE, URINE: ABNORMAL
MCH RBC QN AUTO: 29.3 PG (ref 26–33)
MCHC RBC AUTO-ENTMCNC: 30.8 GM/DL (ref 32.2–35.5)
MCV RBC AUTO: 95.2 FL (ref 81–99)
MICROALBUMIN UR-MCNC: 30.1 MG/DL
MICROALBUMIN/CREAT UR-RTO: 388 MG/G (ref 0–30)
MUCUS: ABNORMAL
NITRITE, URINE: NEGATIVE
PH UA: 6.5 (ref 5–9)
PLATELET # BLD: 172 THOU/MM3 (ref 130–400)
PMV BLD AUTO: 10.2 FL (ref 9.4–12.4)
POTASSIUM REFLEX MAGNESIUM: 6 MEQ/L (ref 3.5–5.2)
POTASSIUM SERPL-SCNC: 5.2 MEQ/L (ref 3.5–5.2)
POTASSIUM, URINE: 34 MEQ/L
PROT/CREAT RATIO, UR: 1.18
PROTEIN UA: 100 MG/DL
PROTEIN, URINE: 91.6 MG/DL
RBC # BLD: 4.54 MILL/MM3 (ref 4.2–5.4)
RBC URINE: ABNORMAL /HPF
RH FACTOR: NORMAL
SODIUM BLD-SCNC: 138 MEQ/L (ref 135–145)
SODIUM BLD-SCNC: 140 MEQ/L (ref 135–145)
SODIUM URINE: 72 MEQ/L
SPECIFIC GRAVITY UA: 1.01 (ref 1–1.03)
UROBILINOGEN, URINE: 0.2 EU/DL (ref 0–1)
WBC # BLD: 6.1 THOU/MM3 (ref 4.8–10.8)
WBC UA: > 200 /HPF

## 2019-08-23 PROCEDURE — 6370000000 HC RX 637 (ALT 250 FOR IP): Performed by: INTERNAL MEDICINE

## 2019-08-23 PROCEDURE — 82043 UR ALBUMIN QUANTITATIVE: CPT

## 2019-08-23 PROCEDURE — 85730 THROMBOPLASTIN TIME PARTIAL: CPT

## 2019-08-23 PROCEDURE — 87186 SC STD MICRODIL/AGAR DIL: CPT

## 2019-08-23 PROCEDURE — 80048 BASIC METABOLIC PNL TOTAL CA: CPT

## 2019-08-23 PROCEDURE — 2709999900 HC NON-CHARGEABLE SUPPLY

## 2019-08-23 PROCEDURE — 36415 COLL VENOUS BLD VENIPUNCTURE: CPT

## 2019-08-23 PROCEDURE — 1200000003 HC TELEMETRY R&B

## 2019-08-23 PROCEDURE — 99222 1ST HOSP IP/OBS MODERATE 55: CPT | Performed by: INTERNAL MEDICINE

## 2019-08-23 PROCEDURE — 86850 RBC ANTIBODY SCREEN: CPT

## 2019-08-23 PROCEDURE — 86901 BLOOD TYPING SEROLOGIC RH(D): CPT

## 2019-08-23 PROCEDURE — 84156 ASSAY OF PROTEIN URINE: CPT

## 2019-08-23 PROCEDURE — 85027 COMPLETE CBC AUTOMATED: CPT

## 2019-08-23 PROCEDURE — 99223 1ST HOSP IP/OBS HIGH 75: CPT | Performed by: INTERNAL MEDICINE

## 2019-08-23 PROCEDURE — 87077 CULTURE AEROBIC IDENTIFY: CPT

## 2019-08-23 PROCEDURE — 2580000003 HC RX 258: Performed by: INTERNAL MEDICINE

## 2019-08-23 PROCEDURE — 85610 PROTHROMBIN TIME: CPT

## 2019-08-23 PROCEDURE — 84300 ASSAY OF URINE SODIUM: CPT

## 2019-08-23 PROCEDURE — 86900 BLOOD TYPING SEROLOGIC ABO: CPT

## 2019-08-23 PROCEDURE — 82570 ASSAY OF URINE CREATININE: CPT

## 2019-08-23 PROCEDURE — 2580000003 HC RX 258: Performed by: PHYSICIAN ASSISTANT

## 2019-08-23 PROCEDURE — 82436 ASSAY OF URINE CHLORIDE: CPT

## 2019-08-23 PROCEDURE — 81001 URINALYSIS AUTO W/SCOPE: CPT

## 2019-08-23 PROCEDURE — 84133 ASSAY OF URINE POTASSIUM: CPT

## 2019-08-23 PROCEDURE — 87086 URINE CULTURE/COLONY COUNT: CPT

## 2019-08-23 RX ORDER — DEXTROSE MONOHYDRATE 25 G/50ML
25 INJECTION, SOLUTION INTRAVENOUS ONCE
Status: COMPLETED | OUTPATIENT
Start: 2019-08-23 | End: 2019-08-23

## 2019-08-23 RX ORDER — ASPIRIN 325 MG
325 TABLET ORAL ONCE
Status: DISCONTINUED | OUTPATIENT
Start: 2019-08-23 | End: 2019-08-23

## 2019-08-23 RX ORDER — ASPIRIN 81 MG/1
81 TABLET ORAL ONCE
Status: DISCONTINUED | OUTPATIENT
Start: 2019-08-23 | End: 2019-08-23 | Stop reason: SDUPTHER

## 2019-08-23 RX ORDER — NITROGLYCERIN 0.4 MG/1
0.4 TABLET SUBLINGUAL EVERY 5 MIN PRN
Status: DISCONTINUED | OUTPATIENT
Start: 2019-08-23 | End: 2019-08-27 | Stop reason: SDUPTHER

## 2019-08-23 RX ORDER — SODIUM CHLORIDE 0.9 % (FLUSH) 0.9 %
10 SYRINGE (ML) INJECTION PRN
Status: DISCONTINUED | OUTPATIENT
Start: 2019-08-23 | End: 2019-08-27 | Stop reason: SDUPTHER

## 2019-08-23 RX ORDER — SODIUM CHLORIDE 0.9 % (FLUSH) 0.9 %
10 SYRINGE (ML) INJECTION EVERY 12 HOURS SCHEDULED
Status: DISCONTINUED | OUTPATIENT
Start: 2019-08-23 | End: 2019-08-27 | Stop reason: SDUPTHER

## 2019-08-23 RX ORDER — CITALOPRAM 40 MG/1
40 TABLET ORAL DAILY
Status: DISCONTINUED | OUTPATIENT
Start: 2019-08-23 | End: 2019-08-28 | Stop reason: HOSPADM

## 2019-08-23 RX ORDER — ASPIRIN 81 MG/1
81 TABLET, CHEWABLE ORAL DAILY
Status: DISCONTINUED | OUTPATIENT
Start: 2019-08-23 | End: 2019-08-28 | Stop reason: HOSPADM

## 2019-08-23 RX ORDER — SODIUM CHLORIDE 9 MG/ML
INJECTION, SOLUTION INTRAVENOUS CONTINUOUS
Status: DISCONTINUED | OUTPATIENT
Start: 2019-08-23 | End: 2019-08-27 | Stop reason: ALTCHOICE

## 2019-08-23 RX ORDER — MAGNESIUM OXIDE 400 MG/1
400 TABLET ORAL DAILY
Status: DISCONTINUED | OUTPATIENT
Start: 2019-08-23 | End: 2019-08-23 | Stop reason: SDUPTHER

## 2019-08-23 RX ORDER — SODIUM POLYSTYRENE SULFONATE 4.1 MEQ/G
30 POWDER, FOR SUSPENSION ORAL; RECTAL ONCE
Status: COMPLETED | OUTPATIENT
Start: 2019-08-23 | End: 2019-08-23

## 2019-08-23 RX ADMIN — ASPIRIN 81 MG 81 MG: 81 TABLET ORAL at 10:31

## 2019-08-23 RX ADMIN — INSULIN HUMAN 10 UNITS: 100 INJECTION, SOLUTION PARENTERAL at 10:26

## 2019-08-23 RX ADMIN — SODIUM POLYSTYRENE SULFONATE 30 G: 1 POWDER ORAL; RECTAL at 10:31

## 2019-08-23 RX ADMIN — SODIUM CHLORIDE: 9 INJECTION, SOLUTION INTRAVENOUS at 07:59

## 2019-08-23 RX ADMIN — DEXTROSE MONOHYDRATE 25 G: 25 INJECTION, SOLUTION INTRAVENOUS at 10:27

## 2019-08-23 ASSESSMENT — ENCOUNTER SYMPTOMS
ABDOMINAL PAIN: 0
BLOOD IN STOOL: 0
NAUSEA: 0
SORE THROAT: 0
EYE PAIN: 0
SHORTNESS OF BREATH: 1
BACK PAIN: 0
EYES NEGATIVE: 1
VOMITING: 0
DIARRHEA: 0
COUGH: 0

## 2019-08-23 ASSESSMENT — PAIN SCALES - GENERAL
PAINLEVEL_OUTOF10: 0
PAINLEVEL_OUTOF10: 0

## 2019-08-23 NOTE — H&P
08/23/19 0727   WBC 6.1   HGB 13.3   HCT 43.2   MCV 95.2        BMP:   Recent Labs     08/23/19 0727      K 6.0*   CL 97*   CO2 28   *   CREATININE 3.7*     Accucheck Glucoses: No results for input(s): POCGLU in the last 72 hours. Cardiac Enzymes: No results for input(s): CKTOTAL, CKMB, CKMBINDEX, TROPONINI in the last 72 hours. PT/INR:   Recent Labs     08/23/19 0727   INR 1.00     APTT:   Recent Labs     08/23/19 0727   APTT 37.8     Liver Profile:  Lab Results   Component Value Date    AST 15 06/18/2019    ALT 6 06/18/2019    BILITOT 0.4 06/18/2019    ALKPHOS 100 06/18/2019     Lab Results   Component Value Date    CHOL 131 06/18/2019    HDL 43 06/18/2019    TRIG 66 06/18/2019     TSH: No results found for: TSH  UA: No results found for: NITRITE, COLORU, PHUR, LABCAST, WBCUA, RBCUA, MUCUS, TRICHOMONAS, YEAST, BACTERIA, CLARITYU, SPECGRAV, LEUKOCYTESUR, UROBILINOGEN, BILIRUBINUR, BLOODU, GLUCOSEU, AMORPHOUS        Diagnostic Data:    EKG:   Echo:   Stress test:  Cardiac Angiography:        Physical Exam:  Vitals:    08/23/19 0710   BP: (!) 105/48   Pulse: 63   Resp: 20   Temp: 97.8 °F (36.6 °C)   SpO2: 96%     General: Well-developed, well-nourished patient in no acute distress  Neck: Supple, no JVD, no carotid bruits  Heart: Regular rhythm normal S1 and S2, no rubs, murmurs or gallops  Lungs: no rales, wheezes, or rhonchi  Abdomen: positive bowel sounds, soft, non-tender, non-distended, no bruits, no masses  Extremities:no clubbing, cyanosis, 1-2+ edema, chronically appear bluish  Neurologic: alert and oriented x 3, cranial nerves 2-12 grossly intact, motor and sensory intact, moving all extremities      Assessment:  ALVIN  Hyperkalemia  Chronic Diastolic CHF, NYHA II  RV volume/pressure overload  ? Acrocyanosis - shunt? Plan:  1. No cath  2. IVF hydration  3. Kayexalate  4. ECG  5. Admit to inpatient  6. Nephrology consultation  7.  Consider RHC if necessary to manage volume

## 2019-08-23 NOTE — CONSULTS
[facility]          [facility]        [address]        [place] [facility]        [phone]           Inpatient Initial consult note         8/23/2019 6:51 PM    Patient Name:   Yoseph Alarcon:    1950  Primary Care Physician:  Keri Knutson MD     History Obtained From:  patient     Consultation requested by : Simone Cueva MD    Consultation requested for  : Evaluation of  worsening renal function and hyperkalemia     History of presentingillness   Jim Townsend is a 79 y.o.   female with Past Medical History as stated below presented with a chief complaint of No chief complaint on file. on 8/23/2019 . Patient apparently has been having swelling, bilateral lower extremities, very severe associated with some shortness of breath. No associated symptoms of fever or chills she has significant cough associated as well. Has some abdomen felt tight. So she was seen by cardiology and subsequently she was started on some diuretics and apparently patient says she has lost almost 60 pounds weight since the increase in diuretics 2 weeks ago. She also had an echocardiogram and subsequently brought in for a cardiac catheterization.     The labs drawn today showed that the patient has significantly worsening renal function and also hyperkalemia so the procedure has been canceled and nephrology has been consulted for further evaluation and management     Past History      Past Medical History:   Diagnosis Date    Depression     Hypertension     Osteoarthritis     Thrombocytopenia ([address]      Past Surgical History:   Procedure Laterality Date    APPENDECTOMY      FACIAL NERVE SURGERY      1989     Social History     Socioeconomic History    Marital status: Single     Spouse name: Not on file    Number of children: 0    Years of education: Not on file    Highest education level: Not on file   Occupational History    Not on file   Social Needs

## 2019-08-24 LAB
ANION GAP SERPL CALCULATED.3IONS-SCNC: 14 MEQ/L (ref 8–16)
BUN BLDV-MCNC: 108 MG/DL (ref 7–22)
CALCIUM SERPL-MCNC: 9.6 MG/DL (ref 8.5–10.5)
CHLORIDE BLD-SCNC: 102 MEQ/L (ref 98–111)
CO2: 26 MEQ/L (ref 23–33)
CREAT SERPL-MCNC: 2.4 MG/DL (ref 0.4–1.2)
GFR SERPL CREATININE-BSD FRML MDRD: 20 ML/MIN/1.73M2
GLUCOSE BLD-MCNC: 91 MG/DL (ref 70–108)
POTASSIUM SERPL-SCNC: 4.6 MEQ/L (ref 3.5–5.2)
REASON FOR REJECTION: NORMAL
REJECTED TEST: NORMAL
SODIUM BLD-SCNC: 142 MEQ/L (ref 135–145)

## 2019-08-24 PROCEDURE — 6370000000 HC RX 637 (ALT 250 FOR IP): Performed by: INTERNAL MEDICINE

## 2019-08-24 PROCEDURE — 2709999900 HC NON-CHARGEABLE SUPPLY

## 2019-08-24 PROCEDURE — 1200000003 HC TELEMETRY R&B

## 2019-08-24 PROCEDURE — 80048 BASIC METABOLIC PNL TOTAL CA: CPT

## 2019-08-24 PROCEDURE — 99232 SBSQ HOSP IP/OBS MODERATE 35: CPT | Performed by: NURSE PRACTITIONER

## 2019-08-24 PROCEDURE — 99232 SBSQ HOSP IP/OBS MODERATE 35: CPT | Performed by: INTERNAL MEDICINE

## 2019-08-24 PROCEDURE — 36415 COLL VENOUS BLD VENIPUNCTURE: CPT

## 2019-08-24 PROCEDURE — 2580000003 HC RX 258: Performed by: INTERNAL MEDICINE

## 2019-08-24 RX ADMIN — MAGNESIUM GLUCONATE 500 MG ORAL TABLET 400 MG: 500 TABLET ORAL at 09:56

## 2019-08-24 RX ADMIN — CITALOPRAM 40 MG: 40 TABLET, FILM COATED ORAL at 21:08

## 2019-08-24 RX ADMIN — SODIUM CHLORIDE: 9 INJECTION, SOLUTION INTRAVENOUS at 23:24

## 2019-08-24 RX ADMIN — ASPIRIN 81 MG 81 MG: 81 TABLET ORAL at 09:54

## 2019-08-24 RX ADMIN — SODIUM CHLORIDE: 9 INJECTION, SOLUTION INTRAVENOUS at 01:03

## 2019-08-24 RX ADMIN — SODIUM CHLORIDE: 9 INJECTION, SOLUTION INTRAVENOUS at 09:56

## 2019-08-24 ASSESSMENT — PAIN SCALES - GENERAL: PAINLEVEL_OUTOF10: 0

## 2019-08-24 NOTE — PLAN OF CARE
Problem: Falls - Risk of:  Goal: Will remain free from falls  Description  Will remain free from falls  Outcome: Met This Shift  Note:   Patient up with walker & stand by assist with steady gait. Patient uses call light when wanting up. Problem: Discharge Planning:  Goal: Discharged to appropriate level of care  Description  Discharged to appropriate level of care  Outcome: Ongoing  Note:   Patient plans to be discharged to home with family support. Patient denies pain. Patient has a reddened buttocks with bleeding noted, EPC cream applied. Redness under abdominal folds, buttocks which was present on admission. Care plan reviewed with patient and family. Patient and family verbalize understanding of the plan of care and contribute to goal setting.

## 2019-08-25 LAB
ANION GAP SERPL CALCULATED.3IONS-SCNC: 10 MEQ/L (ref 8–16)
BUN BLDV-MCNC: 89 MG/DL (ref 7–22)
CALCIUM SERPL-MCNC: 9.7 MG/DL (ref 8.5–10.5)
CHLORIDE BLD-SCNC: 106 MEQ/L (ref 98–111)
CO2: 26 MEQ/L (ref 23–33)
CREAT SERPL-MCNC: 1.7 MG/DL (ref 0.4–1.2)
GFR SERPL CREATININE-BSD FRML MDRD: 30 ML/MIN/1.73M2
GLUCOSE BLD-MCNC: 98 MG/DL (ref 70–108)
POTASSIUM SERPL-SCNC: 4.8 MEQ/L (ref 3.5–5.2)
SODIUM BLD-SCNC: 142 MEQ/L (ref 135–145)

## 2019-08-25 PROCEDURE — 6360000002 HC RX W HCPCS: Performed by: INTERNAL MEDICINE

## 2019-08-25 PROCEDURE — 2580000003 HC RX 258: Performed by: INTERNAL MEDICINE

## 2019-08-25 PROCEDURE — 36415 COLL VENOUS BLD VENIPUNCTURE: CPT

## 2019-08-25 PROCEDURE — 2709999900 HC NON-CHARGEABLE SUPPLY

## 2019-08-25 PROCEDURE — 99232 SBSQ HOSP IP/OBS MODERATE 35: CPT | Performed by: NURSE PRACTITIONER

## 2019-08-25 PROCEDURE — 6370000000 HC RX 637 (ALT 250 FOR IP): Performed by: INTERNAL MEDICINE

## 2019-08-25 PROCEDURE — 80048 BASIC METABOLIC PNL TOTAL CA: CPT

## 2019-08-25 PROCEDURE — 99232 SBSQ HOSP IP/OBS MODERATE 35: CPT | Performed by: INTERNAL MEDICINE

## 2019-08-25 PROCEDURE — 1200000003 HC TELEMETRY R&B

## 2019-08-25 RX ORDER — ACETYLCYSTEINE 200 MG/ML
1200 SOLUTION ORAL; RESPIRATORY (INHALATION) EVERY 12 HOURS
Status: COMPLETED | OUTPATIENT
Start: 2019-08-25 | End: 2019-08-27

## 2019-08-25 RX ADMIN — SODIUM CHLORIDE: 9 INJECTION, SOLUTION INTRAVENOUS at 13:24

## 2019-08-25 RX ADMIN — ASPIRIN 81 MG 81 MG: 81 TABLET ORAL at 09:21

## 2019-08-25 RX ADMIN — ACETYLCYSTEINE 1200 MG: 200 SOLUTION ORAL; RESPIRATORY (INHALATION) at 20:17

## 2019-08-25 RX ADMIN — CITALOPRAM 40 MG: 40 TABLET, FILM COATED ORAL at 20:17

## 2019-08-25 RX ADMIN — MAGNESIUM GLUCONATE 500 MG ORAL TABLET 400 MG: 500 TABLET ORAL at 09:21

## 2019-08-25 RX ADMIN — CEFTRIAXONE SODIUM 1 G: 1 INJECTION, POWDER, FOR SOLUTION INTRAMUSCULAR; INTRAVENOUS at 16:32

## 2019-08-25 ASSESSMENT — PAIN SCALES - GENERAL
PAINLEVEL_OUTOF10: 0
PAINLEVEL_OUTOF10: 0

## 2019-08-25 NOTE — PLAN OF CARE
Problem: Discharge Planning:  Goal: Discharged to appropriate level of care  Description  Discharged to appropriate level of care  Outcome: Ongoing  Note:   Plans to go home at discharge. Problem: Tissue Perfusion - Peripheral, Altered:  Goal: Absence of hematoma at arterial access site  Description  Absence of hematoma at arterial access site  Outcome: Ongoing  Note:   Waiting for heart cath possible tomorrow. Goal: Circulatory function of lower extremities is within specified parameters  Description  Circulatory function of lower extremities is within specified parameters  Outcome: Ongoing  Note:   Waiting for heart cath possible tomorrow. Problem: Falls - Risk of:  Goal: Will remain free from falls  Description  Will remain free from falls  Outcome: Ongoing  Note:   No falls throughout the shift. Falling star program within place. Assistance provided with ambulation. Bed and chair alarm on. Call light within reach. Goal: Absence of physical injury  Description  Absence of physical injury  Outcome: Ongoing  Note:   No falls throughout the shift. Falling star program within place. Assistance provided with ambulation. Bed and chair alarm on. Call light within reach. Problem: Risk for Impaired Skin Integrity  Goal: Tissue integrity - skin and mucous membranes  Description  Structural intactness and normal physiological function of skin and  mucous membranes. Outcome: Ongoing  Note:   No new skin issues noted. Repositioned frequently. Problem: Tissue Perfusion - Renal, Altered:  Goal: Electrolytes within specified parameters  Description  Electrolytes within specified parameters  Outcome: Ongoing  Note:   Electrolytes WNL. Will monitor. Fluids continue. Goal: Serum creatinine will be within specified parameters  Description  Serum creatinine will be within specified parameters  Outcome: Ongoing  Note:   Serum creatinine improving. Fluids continues. Care plan reviewed with patient.   Patient

## 2019-08-26 ENCOUNTER — PREP FOR PROCEDURE (OUTPATIENT)
Dept: CARDIOLOGY | Age: 69
End: 2019-08-26

## 2019-08-26 LAB
ANION GAP SERPL CALCULATED.3IONS-SCNC: 10 MEQ/L (ref 8–16)
BUN BLDV-MCNC: 54 MG/DL (ref 7–22)
CALCIUM SERPL-MCNC: 9.8 MG/DL (ref 8.5–10.5)
CHLORIDE BLD-SCNC: 107 MEQ/L (ref 98–111)
CO2: 28 MEQ/L (ref 23–33)
CREAT SERPL-MCNC: 1.1 MG/DL (ref 0.4–1.2)
GFR SERPL CREATININE-BSD FRML MDRD: 50 ML/MIN/1.73M2
GLUCOSE BLD-MCNC: 95 MG/DL (ref 70–108)
ORGANISM: ABNORMAL
POTASSIUM SERPL-SCNC: 4.7 MEQ/L (ref 3.5–5.2)
SODIUM BLD-SCNC: 145 MEQ/L (ref 135–145)
URINE CULTURE, ROUTINE: ABNORMAL

## 2019-08-26 PROCEDURE — 1200000003 HC TELEMETRY R&B

## 2019-08-26 PROCEDURE — 36415 COLL VENOUS BLD VENIPUNCTURE: CPT

## 2019-08-26 PROCEDURE — 6360000002 HC RX W HCPCS: Performed by: INTERNAL MEDICINE

## 2019-08-26 PROCEDURE — 99232 SBSQ HOSP IP/OBS MODERATE 35: CPT | Performed by: INTERNAL MEDICINE

## 2019-08-26 PROCEDURE — 6370000000 HC RX 637 (ALT 250 FOR IP): Performed by: INTERNAL MEDICINE

## 2019-08-26 PROCEDURE — 2580000003 HC RX 258: Performed by: INTERNAL MEDICINE

## 2019-08-26 PROCEDURE — 6370000000 HC RX 637 (ALT 250 FOR IP): Performed by: NURSE PRACTITIONER

## 2019-08-26 PROCEDURE — 80048 BASIC METABOLIC PNL TOTAL CA: CPT

## 2019-08-26 PROCEDURE — 2709999900 HC NON-CHARGEABLE SUPPLY

## 2019-08-26 PROCEDURE — 99232 SBSQ HOSP IP/OBS MODERATE 35: CPT | Performed by: NURSE PRACTITIONER

## 2019-08-26 PROCEDURE — 94760 N-INVAS EAR/PLS OXIMETRY 1: CPT

## 2019-08-26 RX ORDER — ASPIRIN 325 MG
325 TABLET ORAL ONCE
Status: CANCELLED | OUTPATIENT
Start: 2019-08-26 | End: 2019-08-26

## 2019-08-26 RX ORDER — SODIUM CHLORIDE 0.9 % (FLUSH) 0.9 %
10 SYRINGE (ML) INJECTION EVERY 12 HOURS SCHEDULED
Status: CANCELLED | OUTPATIENT
Start: 2019-08-26

## 2019-08-26 RX ORDER — SODIUM CHLORIDE 9 MG/ML
INJECTION, SOLUTION INTRAVENOUS CONTINUOUS
Status: CANCELLED | OUTPATIENT
Start: 2019-08-26

## 2019-08-26 RX ORDER — SODIUM CHLORIDE 0.9 % (FLUSH) 0.9 %
10 SYRINGE (ML) INJECTION PRN
Status: CANCELLED | OUTPATIENT
Start: 2019-08-26

## 2019-08-26 RX ORDER — NITROGLYCERIN 0.4 MG/1
0.4 TABLET SUBLINGUAL EVERY 5 MIN PRN
Status: CANCELLED | OUTPATIENT
Start: 2019-08-26

## 2019-08-26 RX ADMIN — ACETYLCYSTEINE 1200 MG: 200 SOLUTION ORAL; RESPIRATORY (INHALATION) at 22:25

## 2019-08-26 RX ADMIN — CEFTRIAXONE SODIUM 1 G: 1 INJECTION, POWDER, FOR SOLUTION INTRAMUSCULAR; INTRAVENOUS at 16:13

## 2019-08-26 RX ADMIN — METOPROLOL TARTRATE 12.5 MG: 25 TABLET ORAL at 10:02

## 2019-08-26 RX ADMIN — MAGNESIUM GLUCONATE 500 MG ORAL TABLET 400 MG: 500 TABLET ORAL at 09:30

## 2019-08-26 RX ADMIN — METOPROLOL TARTRATE 12.5 MG: 25 TABLET ORAL at 21:04

## 2019-08-26 RX ADMIN — CITALOPRAM 40 MG: 40 TABLET, FILM COATED ORAL at 21:04

## 2019-08-26 RX ADMIN — ASPIRIN 81 MG 81 MG: 81 TABLET ORAL at 09:30

## 2019-08-26 RX ADMIN — SODIUM CHLORIDE: 9 INJECTION, SOLUTION INTRAVENOUS at 01:22

## 2019-08-26 RX ADMIN — ACETYLCYSTEINE 1200 MG: 200 SOLUTION ORAL; RESPIRATORY (INHALATION) at 14:09

## 2019-08-26 ASSESSMENT — PAIN SCALES - GENERAL
PAINLEVEL_OUTOF10: 0

## 2019-08-27 ENCOUNTER — TELEPHONE (OUTPATIENT)
Dept: CARDIOLOGY CLINIC | Age: 69
End: 2019-08-27

## 2019-08-27 ENCOUNTER — APPOINTMENT (OUTPATIENT)
Dept: CARDIAC CATH/INVASIVE PROCEDURES | Age: 69
DRG: 683 | End: 2019-08-27
Attending: INTERNAL MEDICINE
Payer: MEDICARE

## 2019-08-27 DIAGNOSIS — I27.20 PULMONARY HYPERTENSION (HCC): Primary | ICD-10-CM

## 2019-08-27 LAB
ABO: NORMAL
ANION GAP SERPL CALCULATED.3IONS-SCNC: 10 MEQ/L (ref 8–16)
ANION GAP SERPL CALCULATED.3IONS-SCNC: 10 MEQ/L (ref 8–16)
ANTIBODY SCREEN: NORMAL
APTT: 35.8 SECONDS (ref 22–38)
BUN BLDV-MCNC: 31 MG/DL (ref 7–22)
BUN BLDV-MCNC: 34 MG/DL (ref 7–22)
CALCIUM SERPL-MCNC: 10 MG/DL (ref 8.5–10.5)
CALCIUM SERPL-MCNC: 9.7 MG/DL (ref 8.5–10.5)
CHLORIDE BLD-SCNC: 108 MEQ/L (ref 98–111)
CHLORIDE BLD-SCNC: 109 MEQ/L (ref 98–111)
CO2: 24 MEQ/L (ref 23–33)
CO2: 27 MEQ/L (ref 23–33)
COLLECTED BY:: ABNORMAL
COLLECTED BY:: ABNORMAL
COLLECTED BY:: NORMAL
CREAT SERPL-MCNC: 1.2 MG/DL (ref 0.4–1.2)
CREAT SERPL-MCNC: 1.2 MG/DL (ref 0.4–1.2)
EKG ATRIAL RATE: 76 BPM
EKG P AXIS: 60 DEGREES
EKG P-R INTERVAL: 198 MS
EKG Q-T INTERVAL: 392 MS
EKG QRS DURATION: 88 MS
EKG QTC CALCULATION (BAZETT): 441 MS
EKG R AXIS: 105 DEGREES
EKG VENTRICULAR RATE: 76 BPM
ERYTHROCYTE [DISTWIDTH] IN BLOOD BY AUTOMATED COUNT: 13.7 % (ref 11.5–14.5)
ERYTHROCYTE [DISTWIDTH] IN BLOOD BY AUTOMATED COUNT: 50 FL (ref 35–45)
GFR SERPL CREATININE-BSD FRML MDRD: 45 ML/MIN/1.73M2
GFR SERPL CREATININE-BSD FRML MDRD: 45 ML/MIN/1.73M2
GLUCOSE BLD-MCNC: 92 MG/DL (ref 70–108)
GLUCOSE BLD-MCNC: 96 MG/DL (ref 70–108)
HCT VFR BLD CALC: 40.3 % (ref 37–47)
HEMOGLOBIN: 12 GM/DL (ref 12–16)
INR BLD: 1.07 (ref 0.85–1.13)
MCH RBC QN AUTO: 29.3 PG (ref 26–33)
MCHC RBC AUTO-ENTMCNC: 29.8 GM/DL (ref 32.2–35.5)
MCV RBC AUTO: 98.5 FL (ref 81–99)
PLATELET # BLD: 123 THOU/MM3 (ref 130–400)
PMV BLD AUTO: 9.7 FL (ref 9.4–12.4)
POC O2 SATURATION: 66 % (ref 94–97)
POC O2 SATURATION: 67 % (ref 94–97)
POC O2 SATURATION: 96 % (ref 94–97)
POTASSIUM REFLEX MAGNESIUM: 4.4 MEQ/L (ref 3.5–5.2)
POTASSIUM SERPL-SCNC: 4.2 MEQ/L (ref 3.5–5.2)
RBC # BLD: 4.09 MILL/MM3 (ref 4.2–5.4)
RH FACTOR: NORMAL
SODIUM BLD-SCNC: 143 MEQ/L (ref 135–145)
SODIUM BLD-SCNC: 145 MEQ/L (ref 135–145)
SOURCE, BLOOD GAS: ABNORMAL
SOURCE, BLOOD GAS: ABNORMAL
SOURCE, BLOOD GAS: NORMAL
WBC # BLD: 4.5 THOU/MM3 (ref 4.8–10.8)

## 2019-08-27 PROCEDURE — C1769 GUIDE WIRE: HCPCS

## 2019-08-27 PROCEDURE — 86900 BLOOD TYPING SEROLOGIC ABO: CPT

## 2019-08-27 PROCEDURE — 2500000003 HC RX 250 WO HCPCS

## 2019-08-27 PROCEDURE — 6370000000 HC RX 637 (ALT 250 FOR IP): Performed by: NURSE PRACTITIONER

## 2019-08-27 PROCEDURE — 80048 BASIC METABOLIC PNL TOTAL CA: CPT

## 2019-08-27 PROCEDURE — 6370000000 HC RX 637 (ALT 250 FOR IP): Performed by: INTERNAL MEDICINE

## 2019-08-27 PROCEDURE — 93005 ELECTROCARDIOGRAM TRACING: CPT | Performed by: NURSE PRACTITIONER

## 2019-08-27 PROCEDURE — 2580000003 HC RX 258: Performed by: INTERNAL MEDICINE

## 2019-08-27 PROCEDURE — 99024 POSTOP FOLLOW-UP VISIT: CPT | Performed by: PHYSICIAN ASSISTANT

## 2019-08-27 PROCEDURE — 6360000002 HC RX W HCPCS

## 2019-08-27 PROCEDURE — C1887 CATHETER, GUIDING: HCPCS

## 2019-08-27 PROCEDURE — 99232 SBSQ HOSP IP/OBS MODERATE 35: CPT | Performed by: INTERNAL MEDICINE

## 2019-08-27 PROCEDURE — 85610 PROTHROMBIN TIME: CPT

## 2019-08-27 PROCEDURE — 93460 R&L HRT ART/VENTRICLE ANGIO: CPT | Performed by: INTERNAL MEDICINE

## 2019-08-27 PROCEDURE — 86850 RBC ANTIBODY SCREEN: CPT

## 2019-08-27 PROCEDURE — 2709999900 HC NON-CHARGEABLE SUPPLY

## 2019-08-27 PROCEDURE — B2111ZZ FLUOROSCOPY OF MULTIPLE CORONARY ARTERIES USING LOW OSMOLAR CONTRAST: ICD-10-PCS | Performed by: INTERNAL MEDICINE

## 2019-08-27 PROCEDURE — 85730 THROMBOPLASTIN TIME PARTIAL: CPT

## 2019-08-27 PROCEDURE — 86901 BLOOD TYPING SEROLOGIC RH(D): CPT

## 2019-08-27 PROCEDURE — 36415 COLL VENOUS BLD VENIPUNCTURE: CPT

## 2019-08-27 PROCEDURE — 85027 COMPLETE CBC AUTOMATED: CPT

## 2019-08-27 PROCEDURE — C1894 INTRO/SHEATH, NON-LASER: HCPCS

## 2019-08-27 PROCEDURE — B2151ZZ FLUOROSCOPY OF LEFT HEART USING LOW OSMOLAR CONTRAST: ICD-10-PCS | Performed by: INTERNAL MEDICINE

## 2019-08-27 PROCEDURE — 6360000002 HC RX W HCPCS: Performed by: INTERNAL MEDICINE

## 2019-08-27 PROCEDURE — 82810 BLOOD GASES O2 SAT ONLY: CPT

## 2019-08-27 PROCEDURE — 1200000003 HC TELEMETRY R&B

## 2019-08-27 PROCEDURE — 6360000004 HC RX CONTRAST MEDICATION: Performed by: INTERNAL MEDICINE

## 2019-08-27 PROCEDURE — 93010 ELECTROCARDIOGRAM REPORT: CPT | Performed by: NUCLEAR MEDICINE

## 2019-08-27 PROCEDURE — 4A023N6 MEASUREMENT OF CARDIAC SAMPLING AND PRESSURE, RIGHT HEART, PERCUTANEOUS APPROACH: ICD-10-PCS | Performed by: INTERNAL MEDICINE

## 2019-08-27 RX ORDER — SODIUM CHLORIDE 0.9 % (FLUSH) 0.9 %
10 SYRINGE (ML) INJECTION PRN
Status: DISCONTINUED | OUTPATIENT
Start: 2019-08-27 | End: 2019-08-27 | Stop reason: ALTCHOICE

## 2019-08-27 RX ORDER — ATROPINE SULFATE 0.4 MG/ML
0.5 AMPUL (ML) INJECTION
Status: ACTIVE | OUTPATIENT
Start: 2019-08-27 | End: 2019-08-27

## 2019-08-27 RX ORDER — ACETAMINOPHEN 325 MG/1
650 TABLET ORAL EVERY 4 HOURS PRN
Status: DISCONTINUED | OUTPATIENT
Start: 2019-08-27 | End: 2019-08-28 | Stop reason: HOSPADM

## 2019-08-27 RX ORDER — NITROGLYCERIN 0.4 MG/1
0.4 TABLET SUBLINGUAL EVERY 5 MIN PRN
Status: DISCONTINUED | OUTPATIENT
Start: 2019-08-27 | End: 2019-08-28 | Stop reason: HOSPADM

## 2019-08-27 RX ORDER — SODIUM CHLORIDE 0.9 % (FLUSH) 0.9 %
10 SYRINGE (ML) INJECTION PRN
Status: DISCONTINUED | OUTPATIENT
Start: 2019-08-27 | End: 2019-08-28 | Stop reason: HOSPADM

## 2019-08-27 RX ORDER — ASPIRIN 325 MG
325 TABLET ORAL ONCE
Status: DISCONTINUED | OUTPATIENT
Start: 2019-08-27 | End: 2019-08-28 | Stop reason: HOSPADM

## 2019-08-27 RX ORDER — SODIUM CHLORIDE 0.9 % (FLUSH) 0.9 %
10 SYRINGE (ML) INJECTION EVERY 12 HOURS SCHEDULED
Status: DISCONTINUED | OUTPATIENT
Start: 2019-08-27 | End: 2019-08-27 | Stop reason: SDUPTHER

## 2019-08-27 RX ORDER — SODIUM CHLORIDE 9 MG/ML
INJECTION, SOLUTION INTRAVENOUS CONTINUOUS
Status: DISCONTINUED | OUTPATIENT
Start: 2019-08-27 | End: 2019-08-27 | Stop reason: ALTCHOICE

## 2019-08-27 RX ORDER — SODIUM CHLORIDE 0.9 % (FLUSH) 0.9 %
10 SYRINGE (ML) INJECTION EVERY 12 HOURS SCHEDULED
Status: DISCONTINUED | OUTPATIENT
Start: 2019-08-27 | End: 2019-08-28 | Stop reason: HOSPADM

## 2019-08-27 RX ORDER — SODIUM CHLORIDE 9 MG/ML
INJECTION, SOLUTION INTRAVENOUS CONTINUOUS
Status: DISCONTINUED | OUTPATIENT
Start: 2019-08-27 | End: 2019-08-28

## 2019-08-27 RX ADMIN — CEFTRIAXONE SODIUM 1 G: 1 INJECTION, POWDER, FOR SOLUTION INTRAMUSCULAR; INTRAVENOUS at 16:56

## 2019-08-27 RX ADMIN — CITALOPRAM 40 MG: 40 TABLET, FILM COATED ORAL at 21:04

## 2019-08-27 RX ADMIN — Medication 10 ML: at 23:58

## 2019-08-27 RX ADMIN — ACETYLCYSTEINE 1200 MG: 200 SOLUTION ORAL; RESPIRATORY (INHALATION) at 09:52

## 2019-08-27 RX ADMIN — IOPAMIDOL 80 ML: 755 INJECTION, SOLUTION INTRAVENOUS at 13:53

## 2019-08-27 RX ADMIN — ASPIRIN 81 MG 81 MG: 81 TABLET ORAL at 09:45

## 2019-08-27 RX ADMIN — METOPROLOL TARTRATE 12.5 MG: 25 TABLET ORAL at 09:45

## 2019-08-27 RX ADMIN — ACETYLCYSTEINE 1000 MG: 500 TABLET, EFFERVESCENT ORAL at 21:04

## 2019-08-27 RX ADMIN — METOPROLOL TARTRATE 12.5 MG: 25 TABLET ORAL at 21:04

## 2019-08-27 ASSESSMENT — PAIN SCALES - GENERAL
PAINLEVEL_OUTOF10: 0

## 2019-08-27 NOTE — PRE SEDATION
[facility]  Sedation/Analgesia History & Physical    Pt Name: Nevin Galaviz  Account number: [de-identified]  MRN: [id number]  YOB: 1950  Provider Performing Procedure: Neto Ring MD  Referring Provider: Neto Ring MD   Primary Care Physician: Brandon Ahmadi MD  Date: 8/27/2019    PRE-PROCEDURE    Code Status: FULL CODE  Brief History/Pre-Procedure Diagnosis:   CHF  TIDWELL  Severe right sided enlargement    Consent: : I have discussed with the patient risks, benefits, and alternatives (and relevant risks, benefits, and side effects related to alternatives or not receiving care), and likelihood of the success. The patient and/or representative appear to understand and agree to proceed. The discussion encompasses risks, benefits, and side effects related to the alternatives and the risks related to not receiving the proposed care, treatment, and services. The indication, risks and benefits of the procedure and possible therapeutic consequences and alternatives were discussed with the patient. The patient was given the opportunity to ask questions and to have them answered to his/her satisfaction. Risks of the procedure include but are not limited to the following: Bleeding, hematoma including retroperitoneal hemmorhage, infection, pain and discomfort, injury to the aorta and other blood vessels, rhythm disturbance, low blood pressure, myocardial infarction, stroke, kidney damage/failure, myocardial perforation, allergic reactions to sedatives/contrast material, loss of pulse/vascular compromise requiring surgery, aneurysm/pseudoaneurysm formation, possible loss of a limb/hand/leg, needing blood transfusion, requiring emergent open heart surgery or emergent coronary intervention, the need for intubation/respiratory support, the requirement for defibrillation/cardioversion, and death. Alternatives to and omission of the suggested procedure were discussed.  The patient had no further questions and wished to proceed; the consent form was signed. MEDICAL HISTORY  [x]ASHD/ANGINA/MI/CHF   [x]Hypertension  []Diabetes  [x]Hyperlipidemia  []Smoking  []Family Hx of ASHD  [x]Additional information:       has a past medical history of Depression, Hypertension, Osteoarthritis, and Thrombocytopenia ([facility] [address] SURGICAL HISTORY   has a past surgical history that includes Appendectomy and Facial nerve surgery.   Additional information:       ALLERGIES   Allergies as of 08/23/2019    (No Known Allergies)     Additional information:       MEDICATIONS   Aspirin  [x] 81 mg  [] 325 mg  [] None  Antiplatelet drug therapy use last 5 days  [x]No []Yes  Coumadin Use Last 5 Days [x]No []Yes  Other anticoagulant use last 5 days  [x]No []Yes    Current Facility-Administered Medications:     0.9 % sodium chloride infusion, , Intravenous, Continuous, LANE Patel - CNP, Last Rate: 75 mL/hr at 08/27/19 0955    aspirin tablet 325 mg, 325 mg, Oral, Once, LANE Patel - CNP    nitroGLYCERIN (NITROSTAT) SL tablet 0.4 mg, 0.4 mg, Sublingual, Q5 Min PRN, LANE Patel - CNP    sodium chloride flush 0.9 % injection 10 mL, 10 mL, Intravenous, 2 times per day, Hema Cortes APRN - CNP    sodium chloride flush 0.9 % injection 10 mL, 10 mL, Intravenous, PRN, Hema Cortes, APRN - CNP    Acetylcysteine TBEF 1,000 mg, 1,000 mg, Oral, Q12H, Denise Herzog MD    metoprolol tartrate (LOPRESSOR) tablet 12.5 mg, 12.5 mg, Oral, BID, LANE Patel - CNP, 12.5 mg at 08/27/19 0945    cefTRIAXone (ROCEPHIN) 1 g IVPB in 50 mL D5W minibag, 1 g, Intravenous, Q24H, Denise Herzog MD, Stopped at 08/26/19 1721    aspirin chewable tablet 81 mg, 81 mg, Oral, Daily, Al Jackson MD, 81 mg at 08/27/19 0945    citalopram (CELEXA) tablet 40 mg, 40 mg, Oral, Daily, Al Jackson MD, 40 mg at 08/26/19 2104    magnesium oxide (MAG-OX) tablet 400 mg, 400 mg, Oral, Daily, Al Jackson MD,

## 2019-08-28 ENCOUNTER — TELEPHONE (OUTPATIENT)
Dept: FAMILY MEDICINE CLINIC | Age: 69
End: 2019-08-28

## 2019-08-28 VITALS
RESPIRATION RATE: 16 BRPM | WEIGHT: 219.1 LBS | DIASTOLIC BLOOD PRESSURE: 70 MMHG | HEIGHT: 57 IN | OXYGEN SATURATION: 93 % | HEART RATE: 76 BPM | SYSTOLIC BLOOD PRESSURE: 155 MMHG | BODY MASS INDEX: 47.27 KG/M2 | TEMPERATURE: 98.6 F

## 2019-08-28 LAB
ANION GAP SERPL CALCULATED.3IONS-SCNC: 10 MEQ/L (ref 8–16)
BUN BLDV-MCNC: 26 MG/DL (ref 7–22)
CALCIUM SERPL-MCNC: 9.5 MG/DL (ref 8.5–10.5)
CHLORIDE BLD-SCNC: 111 MEQ/L (ref 98–111)
CO2: 22 MEQ/L (ref 23–33)
CREAT SERPL-MCNC: 1.1 MG/DL (ref 0.4–1.2)
GFR SERPL CREATININE-BSD FRML MDRD: 50 ML/MIN/1.73M2
GLUCOSE BLD-MCNC: 89 MG/DL (ref 70–108)
POTASSIUM SERPL-SCNC: 4.1 MEQ/L (ref 3.5–5.2)
SODIUM BLD-SCNC: 143 MEQ/L (ref 135–145)

## 2019-08-28 PROCEDURE — 6370000000 HC RX 637 (ALT 250 FOR IP): Performed by: NURSE PRACTITIONER

## 2019-08-28 PROCEDURE — 6370000000 HC RX 637 (ALT 250 FOR IP): Performed by: INTERNAL MEDICINE

## 2019-08-28 PROCEDURE — 80048 BASIC METABOLIC PNL TOTAL CA: CPT

## 2019-08-28 PROCEDURE — 99232 SBSQ HOSP IP/OBS MODERATE 35: CPT | Performed by: INTERNAL MEDICINE

## 2019-08-28 PROCEDURE — 2580000003 HC RX 258: Performed by: INTERNAL MEDICINE

## 2019-08-28 PROCEDURE — 36415 COLL VENOUS BLD VENIPUNCTURE: CPT

## 2019-08-28 PROCEDURE — 2709999900 HC NON-CHARGEABLE SUPPLY

## 2019-08-28 PROCEDURE — 99239 HOSP IP/OBS DSCHRG MGMT >30: CPT | Performed by: PHYSICIAN ASSISTANT

## 2019-08-28 RX ADMIN — MAGNESIUM GLUCONATE 500 MG ORAL TABLET 400 MG: 500 TABLET ORAL at 08:22

## 2019-08-28 RX ADMIN — SODIUM CHLORIDE: 9 INJECTION, SOLUTION INTRAVENOUS at 03:15

## 2019-08-28 RX ADMIN — Medication 10 ML: at 08:22

## 2019-08-28 RX ADMIN — ASPIRIN 81 MG 81 MG: 81 TABLET ORAL at 08:22

## 2019-08-28 RX ADMIN — ACETYLCYSTEINE 1000 MG: 500 TABLET, EFFERVESCENT ORAL at 08:22

## 2019-08-28 RX ADMIN — METOPROLOL TARTRATE 12.5 MG: 25 TABLET ORAL at 08:22

## 2019-08-28 ASSESSMENT — PAIN SCALES - GENERAL
PAINLEVEL_OUTOF10: 10
PAINLEVEL_OUTOF10: 0
PAINLEVEL_OUTOF10: 0

## 2019-08-28 NOTE — TELEPHONE ENCOUNTER
.Transition of Care visit scheduled.   9/3/2019  Patient is being discharged to home  Date of discharge 8/28/19  Discharge from facility [facility]  Reason for admission: renal failure, s/p heart cath

## 2019-08-28 NOTE — DISCHARGE SUMMARY
intake/output:    Intake/Output Summary (Last 24 hours) at 8/28/2019 1052  Last data filed at 8/28/2019 0540  Gross per 24 hour   Intake 2078.17 ml   Output --   Net 2078.17 ml       General Appearance: alert and oriented to person, place and time, in no acute distress  Cardiovascular: normal rate, regular rhythm, normal S1 and S2, no murmurs, rubs, clicks, or gallops, distal pulses intact, no carotid bruits, no JVD  Pulmonary/Chest: clear to auscultation bilaterally- no wheezes, rales or rhonchi, normal air movement, no respiratory distress  Abdomen: soft, non-tender, non-distended, normal bowel sounds, no masses Extremities: no cyanosis, clubbing or edema, pulse     Skin: warm and dry, no rash or erythema  Head: normocephalic and atraumatic  Eyes: pupils equal, round, and reactive to light  Neck: supple and non-tender without mass, no thyromegaly   Musculoskeletal: normal range of motion, no joint swelling, deformity or tenderness  Neurological: alert, oriented, normal speech, no focal findings or movement disorder noted  Right wrist - +2 radial pulse, no hematoma    Medications:  Current Discharge Medication List      CONTINUE these medications which have NOT CHANGED    Details   metoprolol tartrate (LOPRESSOR) 25 MG tablet Take 0.5 tablets by mouth 2 times daily  Qty: 60 tablet, Refills: 3      magnesium oxide (MAG-OX) 400 MG tablet Take 1 tablet by mouth daily  Qty: 30 tablet, Refills: 3      potassium chloride (KLOR-CON M) 10 MEQ extended release tablet Take 1 tablet by mouth daily  Qty: 30 tablet, Refills: 3      bumetanide (BUMEX) 0.5 MG tablet Take 2 tablets by mouth 2 times daily  Qty: 180 tablet, Refills: 2    Associated Diagnoses: SOB (shortness of breath) on exertion; Peripheral edema      Multiple Vitamins-Minerals (MULTI-VITAMIN GUMMIES PO) Take by mouth      citalopram (CELEXA) 40 MG tablet Take 1 tablet by mouth daily  Qty: 90 tablet, Refills: 3    Comments: Dose increase  Associated Diagnoses:

## 2019-08-28 NOTE — PROCEDURES
family. They were agreeable and amenable to the above plan.         Carlo Guadalupe MD    D: 08/28/2019 9:27:06       T: 08/28/2019 10:16:03     [email or web address]  Job#: [id number]     Doc#: [id number]    CC:

## 2019-08-28 NOTE — PROGRESS NOTES
Dr Arvin Denver called blood pressure 82/44 and 87/40. Asymptomatic.
Dr Arvin Denver called for orders, states ok to eat.
Dr Sheldon Turner called thru [employer] [facility] for consult. Office notified.
[facility]         Renal Inpatient Follow-Up note         2019 9:09 AM    Pt Name:   Rita Siddiqui:   1950  Attending:   Luna Mercado MD    Chief Complaint : Eileen Serrano is a 79 y.o. female being followed by nephrology for acute kidney injury    Interval History :   Patient seen and examined by me. No distress  Feels well denies any complaints no chest pain no shortness of breath  Overall feeling much better. Scheduled Medications :    cefTRIAXone (ROCEPHIN) IV  1 g Intravenous Q24H    acetylcysteine  1,200 mg Oral Q12H    sodium chloride flush  10 mL Intravenous 2 times per day    aspirin  81 mg Oral Daily    citalopram  40 mg Oral Daily    magnesium oxide  400 mg Oral Daily      sodium chloride 100 mL/hr at 19 0122       Vitals :  BP (!) 161/76   Pulse 97   Temp 98.2 °F (36.8 °C) (Oral)   Resp 16   Ht 4' 9\" (1.448 m)   Wt 219 lb 1.6 oz (99.4 kg)   SpO2 93%   BMI 47.41 kg/m²     24HR INTAKE/OUTPUT:      Intake/Output Summary (Last 24 hours) at 2019 0909  Last data filed at 2019 0352  Gross per 24 hour   Intake 1499.01 ml   Output --   Net 1499.01 ml     Last 3 weights  Wt Readings from Last 3 Encounters:   19 219 lb 1.6 oz (99.4 kg)   19 221 lb 2 oz (100.3 kg)   19 223 lb (101.2 kg)           Physical Exam :  General Appearance:  Well developed. No distress  Mouth/Throat:  Oral mucosa moist  Neck:  Supple, no JVD  Lungs:  Breath sounds: clear  Heart[de-identified]  S1,S2 heard  Abdomen:  Soft, non - tender  Musculoskeletal:  Edema -chronic edema noted which is clinically much better with chronic skin changes         Last 3 CBC   No results for input(s): WBC, RBC, HGB, HCT, PLT in the last 72 hours.   Last 3 CMP  Recent Labs     19  1236 19  0455 19  0526    142 145   K 4.6 4.8 4.7    106 107   CO2    * 89* 54*   CREATININE 2.4* 1.7* 1.1   CALCIUM 9.6 9.7 9.8
[facility]         Renal Inpatient Follow-Up note         8/25/2019 3:05 PM    Pt Name:   Stepan Gene:   1950  Attending:   Kurt Kumar MD    Chief Complaint : Cori Walls is a 79 y.o. female being followed by nephrology for acute kidney injury    Interval History :   Patient seen and examined by me. No distress  Feels well denies any complaints no chest pain no shortness of breath  Overall feeling much better. Very foul smelling urine     Scheduled Medications :    sodium chloride flush  10 mL Intravenous 2 times per day    aspirin  81 mg Oral Daily    citalopram  40 mg Oral Daily    magnesium oxide  400 mg Oral Daily      sodium chloride 75 mL/hr at 08/25/19 1324       Vitals :  BP (!) 150/60   Pulse 102   Temp 97.8 °F (36.6 °C) (Oral)   Resp 18   Ht 4' 9\" (1.448 m)   Wt 216 lb 11.2 oz (98.3 kg)   SpO2 90%   BMI 46.89 kg/m²     24HR INTAKE/OUTPUT:      Intake/Output Summary (Last 24 hours) at 8/25/2019 1505  Last data filed at 8/25/2019 0604  Gross per 24 hour   Intake 2011.16 ml   Output 0 ml   Net 2011.16 ml     Last 3 weights  Wt Readings from Last 3 Encounters:   08/25/19 216 lb 11.2 oz (98.3 kg)   08/07/19 221 lb 2 oz (100.3 kg)   08/01/19 223 lb (101.2 kg)           Physical Exam :  General Appearance:  Well developed.  No distress  Mouth/Throat:  Oral mucosa moist  Neck:  Supple, no JVD  Lungs:  Breath sounds: clear  Heart[de-identified]  S1,S2 heard  Abdomen:  Soft, non - tender  Musculoskeletal:  Edema -chronic edema noted which is clinically much better with chronic skin changes         Last 3 CBC   Recent Labs     08/23/19  0727   WBC 6.1   RBC 4.54   HGB 13.3   HCT 43.2        Last 3 CMP  Recent Labs     08/23/19  1407 08/24/19  1236 08/25/19  0455    142 142   K 5.2 4.6 4.8   CL 97* 102 106   CO2 26 26 26   * 108* 89*   CREATININE 3.4* 2.4* 1.7*   CALCIUM 9.9 9.6 9.7             Assessment / Plan   Renal -acute kidney injury on
[facility]         Renal Inpatient Follow-Up note         8/28/2019 8:17 AM    Pt Name:   Kevin Stephenson:   1950  Attending:   Kathie Murray MD    Chief Complaint : Salbador Song is a 79 y.o. female being followed by nephrology for acute kidney injury    Interval History :   Patient seen and examined by me. No distress  Feels well denies any complaints no chest pain no shortness of breath  Overall feeling much better. S?P cath clean coronories     Scheduled Medications :    aspirin  325 mg Oral Once    Acetylcysteine  1,000 mg Oral Q12H    sodium chloride flush  10 mL Intravenous 2 times per day    metoprolol tartrate  12.5 mg Oral BID    aspirin  81 mg Oral Daily    citalopram  40 mg Oral Daily    magnesium oxide  400 mg Oral Daily      sodium chloride 75 mL/hr at 08/28/19 0315       Vitals :  BP (!) 155/70   Pulse 76   Temp 98.6 °F (37 °C) (Oral)   Resp 16   Ht 4' 9\" (1.448 m)   Wt 219 lb 1.6 oz (99.4 kg)   SpO2 93%   BMI 47.41 kg/m²     24HR INTAKE/OUTPUT:      Intake/Output Summary (Last 24 hours) at 8/28/2019 0817  Last data filed at 8/28/2019 0540  Gross per 24 hour   Intake 2078.17 ml   Output --   Net 2078.17 ml     Last 3 weights  Wt Readings from Last 3 Encounters:   08/26/19 219 lb 1.6 oz (99.4 kg)   08/07/19 221 lb 2 oz (100.3 kg)   08/01/19 223 lb (101.2 kg)           Physical Exam :  General Appearance:  Well developed.  No distress  Mouth/Throat:  Oral mucosa moist  Neck:  Supple, no JVD  Lungs:  Breath sounds: clear  Heart[de-identified]  S1,S2 heard  Abdomen:  Soft, non - tender  Musculoskeletal:  Edema - minimal noted         Last 3 CBC   Recent Labs     08/27/19  1004   WBC 4.5*   RBC 4.09*   HGB 12.0   HCT 40.3   *     Last 3 CMP  Recent Labs     08/27/19  0534 08/27/19  1004 08/28/19  0537    145 143   K 4.2 4.4 4.1    108 111   CO2 24 27 22*   BUN 34* 31* 26*   CREATININE 1.2 1.2 1.1   CALCIUM 9.7 10.0 9.5
Patient AVS reviewed with patient. All question answered. Medications and post radial cath site care explained. Her sister is on her way to get the patient at this time. All follow up care information provided to the patient.
Pt assisted to [facility] to vd  Ret'd to bedside resting in recliner with feet elevated
Spoke to primary nurse regarding consult. Pt has stage 2 to buttocks. Recommend zinc barrier cream, offloading and waffle cushion in chair. Call as needed if area worsens or no improvement noted.
Lab Results   Component Value Date    TRIG 66 06/18/2019    HDL 43 06/18/2019    LDLCALC 75 06/18/2019    LABVLDL 44 01/09/2018       TSH:  No results found for: TSH      Assessment:  · ALVIN on CKD 3: nephrology following  · Hyperkalemia: resolved- k+ 4.6  · EF 55-60 per echo 7/19/19  · Chronic Diastolic CHF, NYHA II  · RV volume pressure overload   Per echo RSVP 27 mmHg    right ventricular size was severely dilated with severely  Reduced function. There is septal flattening in systole and  diastole suggestive of pressure and volume overload of the  right ventricle.     · ? Acrocyanosis- shunt ?   · HTN      Plan:  · Cath cancelled  · IV hydration  · Hold diuretics, ACEI, KCL supplimentation  · Continue BB, asa  · Consider RHC if necessary to manage volume status             Electronically signed by LANE Mauricio CNP on 8/24/2019 at 8:52 PM

## 2019-08-29 ENCOUNTER — TELEPHONE (OUTPATIENT)
Dept: FAMILY MEDICINE CLINIC | Age: 69
End: 2019-08-29

## 2019-09-03 ENCOUNTER — OFFICE VISIT (OUTPATIENT)
Dept: FAMILY MEDICINE CLINIC | Age: 69
End: 2019-09-03
Payer: MEDICARE

## 2019-09-03 VITALS
HEART RATE: 72 BPM | WEIGHT: 220 LBS | SYSTOLIC BLOOD PRESSURE: 126 MMHG | RESPIRATION RATE: 14 BRPM | DIASTOLIC BLOOD PRESSURE: 70 MMHG | BODY MASS INDEX: 47.61 KG/M2

## 2019-09-03 DIAGNOSIS — I50.43 ACUTE ON CHRONIC COMBINED SYSTOLIC AND DIASTOLIC CHF (CONGESTIVE HEART FAILURE) (HCC): ICD-10-CM

## 2019-09-03 DIAGNOSIS — R06.02 SOB (SHORTNESS OF BREATH) ON EXERTION: ICD-10-CM

## 2019-09-03 DIAGNOSIS — I27.20 PULMONARY HTN (HCC): Primary | ICD-10-CM

## 2019-09-03 LAB
EKG ATRIAL RATE: 68 BPM
EKG P AXIS: 75 DEGREES
EKG P-R INTERVAL: 204 MS
EKG Q-T INTERVAL: 430 MS
EKG QRS DURATION: 84 MS
EKG QTC CALCULATION (BAZETT): 457 MS
EKG R AXIS: 103 DEGREES
EKG T AXIS: 24 DEGREES
EKG VENTRICULAR RATE: 68 BPM

## 2019-09-03 PROCEDURE — 99495 TRANSJ CARE MGMT MOD F2F 14D: CPT | Performed by: FAMILY MEDICINE

## 2019-09-03 PROCEDURE — 1111F DSCHRG MED/CURRENT MED MERGE: CPT | Performed by: FAMILY MEDICINE

## 2019-09-03 ASSESSMENT — ENCOUNTER SYMPTOMS
WHEEZING: 0
ABDOMINAL PAIN: 0
BACK PAIN: 0
RHINORRHEA: 0
COUGH: 0
NAUSEA: 0
BLOOD IN STOOL: 0
DIARRHEA: 0
CHEST TIGHTNESS: 0
SHORTNESS OF BREATH: 1
VOMITING: 0
CONSTIPATION: 0
SORE THROAT: 0
EYE PAIN: 0

## 2019-09-03 NOTE — PATIENT INSTRUCTIONS
gain, such as more than 2 to 3 pounds in a day or 5 pounds in a week. (Your doctor may suggest a different range of weight gain.)  ? Feeling dizzy or lightheaded or like you may faint. ? Feeling so tired or weak that you cannot do your usual activities. ? Not sleeping well. Shortness of breath wakes you at night. You need extra pillows to prop yourself up to breathe easier.    Watch closely for changes in your health, and be sure to contact your doctor if:    · You have new or worse symptoms. Where can you learn more? Go to [email or web address]://[email or web address].[email or web address] org and sign in to your [employer] account. Enter I407 in the [place] box to learn more about \"Pulmonary Hypertension: Care Instructions. \"     If you do not have an account, please click on the \"Sign Up Now\" link. Current as of: July 22, 2018  Content Version: 12.1  © 4574-8966 Healthwise, Incorporated. Care instructions adapted under license by [facility] ([facility]). If you have questions about a medical condition or this instruction, always ask your healthcare professional. [address] any warranty or liability for your use of this information.

## 2019-09-05 ENCOUNTER — OFFICE VISIT (OUTPATIENT)
Dept: CARDIOLOGY CLINIC | Age: 69
End: 2019-09-05
Payer: MEDICARE

## 2019-09-05 VITALS
HEIGHT: 57 IN | WEIGHT: 217.6 LBS | DIASTOLIC BLOOD PRESSURE: 66 MMHG | SYSTOLIC BLOOD PRESSURE: 126 MMHG | OXYGEN SATURATION: 90 % | BODY MASS INDEX: 46.94 KG/M2 | HEART RATE: 54 BPM

## 2019-09-05 DIAGNOSIS — N18.30 CHRONIC KIDNEY DISEASE, STAGE III (MODERATE) (HCC): Primary | ICD-10-CM

## 2019-09-05 DIAGNOSIS — I50.32 CHF (CONGESTIVE HEART FAILURE), NYHA CLASS II, CHRONIC, DIASTOLIC (HCC): Primary | ICD-10-CM

## 2019-09-05 DIAGNOSIS — I27.20 PULMONARY HYPERTENSION (HCC): ICD-10-CM

## 2019-09-05 PROCEDURE — G8400 PT W/DXA NO RESULTS DOC: HCPCS | Performed by: NURSE PRACTITIONER

## 2019-09-05 PROCEDURE — 1123F ACP DISCUSS/DSCN MKR DOCD: CPT | Performed by: NURSE PRACTITIONER

## 2019-09-05 PROCEDURE — 99213 OFFICE O/P EST LOW 20 MIN: CPT | Performed by: NURSE PRACTITIONER

## 2019-09-05 PROCEDURE — 1111F DSCHRG MED/CURRENT MED MERGE: CPT | Performed by: NURSE PRACTITIONER

## 2019-09-05 PROCEDURE — 3017F COLORECTAL CA SCREEN DOC REV: CPT | Performed by: NURSE PRACTITIONER

## 2019-09-05 PROCEDURE — G8427 DOCREV CUR MEDS BY ELIG CLIN: HCPCS | Performed by: NURSE PRACTITIONER

## 2019-09-05 PROCEDURE — G8417 CALC BMI ABV UP PARAM F/U: HCPCS | Performed by: NURSE PRACTITIONER

## 2019-09-05 PROCEDURE — 1036F TOBACCO NON-USER: CPT | Performed by: NURSE PRACTITIONER

## 2019-09-05 PROCEDURE — 4040F PNEUMOC VAC/ADMIN/RCVD: CPT | Performed by: NURSE PRACTITIONER

## 2019-09-05 PROCEDURE — 1090F PRES/ABSN URINE INCON ASSESS: CPT | Performed by: NURSE PRACTITIONER

## 2019-09-05 ASSESSMENT — ENCOUNTER SYMPTOMS
NAUSEA: 0
APNEA: 0
ABDOMINAL DISTENTION: 0
COUGH: 0
WHEEZING: 0
CHEST TIGHTNESS: 0
COLOR CHANGE: 0
SHORTNESS OF BREATH: 1
ABDOMINAL PAIN: 0

## 2019-09-05 NOTE — PROGRESS NOTES
ventricular size and systolic function.   There were no regional wall motion abnormalities.  Asymmetric septal hypertrophy.   Ejection fraction was estimated at 55-60%.  E/A flow reversal noted. Suggestive of diastolic dysfunction.      Right Atrium   Right atrial size was normal.      Right Ventricle   The right ventricular size was severely dilated with severely reduced   function. There is septal flattening in systole and diastole suggestive of   pressure and volume overload of the right ventricle.      Pericardial Effusion   The pericardium was normal in appearance with no evidence of a pericardial   effusion.      Pleural Effusion   No evidence of pleural effusion.      Aorta / Great Vessels   -Ascending aorta = 3.3 cm.   -IVC not well seen.     RHC/LHC 8/28/19  RIGHT HEART CATHETERIZATION:  RA 15, RV 81/21, PA 85/31 with a mean of  52, wedge pressure 18 mmHg, PA saturation 66%, RA saturation 66%,  cardiac output 3.4, cardiac index 1.8. SUMMARY:  No significant coronary artery disease, preserved LVEF, severe  pulmonary hypertension, elevated wedge pressure/elevated LVEDP.       Results reviewed:  BNP:   Lab Results   Component Value Date    BNP 23 10/04/2017    PROBNP 1734.0 (H) 08/01/2019     CBC:   Lab Results   Component Value Date    WBC 4.5 08/27/2019    RBC 4.09 08/27/2019    RBC 4.15 01/09/2018    HGB 12.0 08/27/2019    HCT 40.3 08/27/2019     08/27/2019     CMP:    Lab Results   Component Value Date     08/28/2019    K 4.1 08/28/2019    K 4.4 08/27/2019     08/28/2019    CO2 22 08/28/2019    BUN 26 08/28/2019    CREATININE 1.1 08/28/2019    LABGLOM 50 08/28/2019    GLUCOSE 89 08/28/2019    GLUCOSE 103 01/09/2018    CALCIUM 9.5 08/28/2019     Hepatic Function Panel:    Lab Results   Component Value Date    ALKPHOS 100 06/18/2019    ALT 6 06/18/2019    AST 15 06/18/2019    PROT 8.1 06/18/2019    BILITOT 0.4 06/18/2019    LABALBU 3.4 06/18/2019     Magnesium:    Lab Results

## 2019-09-13 ENCOUNTER — HOSPITAL ENCOUNTER (OUTPATIENT)
Dept: GENERAL RADIOLOGY | Age: 69
Discharge: HOME | DRG: 312 | End: 2019-09-13
Payer: MEDICARE

## 2019-09-13 ENCOUNTER — HOSPITAL ENCOUNTER (OUTPATIENT)
Age: 69
Discharge: HOME | DRG: 312 | End: 2019-09-13
Payer: MEDICARE

## 2019-09-13 ENCOUNTER — HOSPITAL ENCOUNTER (OUTPATIENT)
Dept: NUCLEAR MEDICINE | Age: 69
Discharge: HOME | DRG: 312 | End: 2019-09-13
Payer: MEDICARE

## 2019-09-13 DIAGNOSIS — R06.02 BREATH SHORTNESS: ICD-10-CM

## 2019-09-13 DIAGNOSIS — R06.02 SOB (SHORTNESS OF BREATH): ICD-10-CM

## 2019-09-13 PROCEDURE — 78582 LUNG VENTILAT&PERFUS IMAGING: CPT

## 2019-09-13 PROCEDURE — 71046 X-RAY EXAM CHEST 2 VIEWS: CPT

## 2019-09-13 PROCEDURE — 3430000000 HC RX DIAGNOSTIC RADIOPHARMACEUTICAL: Performed by: INTERNAL MEDICINE

## 2019-09-13 PROCEDURE — A9558 XE133 XENON 10MCI: HCPCS | Performed by: INTERNAL MEDICINE

## 2019-09-13 PROCEDURE — A9540 TC99M MAA: HCPCS | Performed by: INTERNAL MEDICINE

## 2019-09-13 RX ORDER — XENON XE-133 10 MCI/1
7.6 GAS RESPIRATORY (INHALATION)
Status: COMPLETED | OUTPATIENT
Start: 2019-09-13 | End: 2019-09-13

## 2019-09-13 RX ADMIN — Medication 3.5 MILLICURIE: at 08:52

## 2019-09-13 RX ADMIN — XENON XE-133 7.6 MILLICURIE: 10 GAS RESPIRATORY (INHALATION) at 08:45

## 2019-09-16 ENCOUNTER — APPOINTMENT (OUTPATIENT)
Dept: GENERAL RADIOLOGY | Age: 69
DRG: 312 | End: 2019-09-16
Payer: MEDICARE

## 2019-09-16 ENCOUNTER — TELEPHONE (OUTPATIENT)
Dept: CARDIOLOGY CLINIC | Age: 69
End: 2019-09-16

## 2019-09-16 ENCOUNTER — HOSPITAL ENCOUNTER (INPATIENT)
Age: 69
LOS: 3 days | Discharge: HOME | DRG: 312 | End: 2019-09-19
Attending: FAMILY MEDICINE | Admitting: INTERNAL MEDICINE
Payer: MEDICARE

## 2019-09-16 DIAGNOSIS — N17.9 ACUTE RENAL FAILURE SUPERIMPOSED ON STAGE 4 CHRONIC KIDNEY DISEASE, UNSPECIFIED ACUTE RENAL FAILURE TYPE (HCC): ICD-10-CM

## 2019-09-16 DIAGNOSIS — R53.1 GENERALIZED WEAKNESS: ICD-10-CM

## 2019-09-16 DIAGNOSIS — R77.8 ELEVATED TROPONIN: ICD-10-CM

## 2019-09-16 DIAGNOSIS — N18.4 ACUTE RENAL FAILURE SUPERIMPOSED ON STAGE 4 CHRONIC KIDNEY DISEASE, UNSPECIFIED ACUTE RENAL FAILURE TYPE (HCC): ICD-10-CM

## 2019-09-16 DIAGNOSIS — R06.02 SOB (SHORTNESS OF BREATH) ON EXERTION: ICD-10-CM

## 2019-09-16 DIAGNOSIS — N17.9 AKI (ACUTE KIDNEY INJURY) (HCC): Primary | ICD-10-CM

## 2019-09-16 DIAGNOSIS — I95.89 OTHER SPECIFIED HYPOTENSION: ICD-10-CM

## 2019-09-16 DIAGNOSIS — R60.9 PERIPHERAL EDEMA: ICD-10-CM

## 2019-09-16 DIAGNOSIS — E86.0 DEHYDRATION: ICD-10-CM

## 2019-09-16 PROBLEM — E86.1 HYPOTENSION DUE TO HYPOVOLEMIA: Status: ACTIVE | Noted: 2019-09-16

## 2019-09-16 PROBLEM — R79.89 ELEVATED TROPONIN: Status: ACTIVE | Noted: 2019-09-16

## 2019-09-16 LAB
ALBUMIN SERPL-MCNC: 3.6 G/DL (ref 3.5–5.1)
ALP BLD-CCNC: 105 U/L (ref 38–126)
ALT SERPL-CCNC: 9 U/L (ref 11–66)
ANION GAP SERPL CALCULATED.3IONS-SCNC: 14 MEQ/L (ref 8–16)
APTT: 35.6 SECONDS (ref 22–38)
AST SERPL-CCNC: 16 U/L (ref 5–40)
BACTERIA: ABNORMAL /HPF
BASOPHILS # BLD: 0.6 %
BASOPHILS ABSOLUTE: 0 THOU/MM3 (ref 0–0.1)
BILIRUB SERPL-MCNC: 0.4 MG/DL (ref 0.3–1.2)
BILIRUBIN URINE: NEGATIVE
BLOOD, URINE: ABNORMAL
BUN BLDV-MCNC: 99 MG/DL (ref 7–22)
CALCIUM SERPL-MCNC: 9.6 MG/DL (ref 8.5–10.5)
CASTS 2: ABNORMAL /LPF
CASTS UA: ABNORMAL /LPF
CHARACTER, URINE: ABNORMAL
CHLORIDE BLD-SCNC: 94 MEQ/L (ref 98–111)
CO2: 30 MEQ/L (ref 23–33)
COLOR: YELLOW
CREAT SERPL-MCNC: 2.9 MG/DL (ref 0.4–1.2)
CRYSTALS, UA: ABNORMAL
EKG ATRIAL RATE: 64 BPM
EKG P AXIS: 54 DEGREES
EKG P-R INTERVAL: 188 MS
EKG Q-T INTERVAL: 458 MS
EKG QRS DURATION: 68 MS
EKG QTC CALCULATION (BAZETT): 472 MS
EKG R AXIS: 111 DEGREES
EKG T AXIS: -10 DEGREES
EKG VENTRICULAR RATE: 64 BPM
EOSINOPHIL # BLD: 2.1 %
EOSINOPHILS ABSOLUTE: 0.2 THOU/MM3 (ref 0–0.4)
EPITHELIAL CELLS, UA: ABNORMAL /HPF
ERYTHROCYTE [DISTWIDTH] IN BLOOD BY AUTOMATED COUNT: 14.3 % (ref 11.5–14.5)
ERYTHROCYTE [DISTWIDTH] IN BLOOD BY AUTOMATED COUNT: 51.6 FL (ref 35–45)
GFR SERPL CREATININE-BSD FRML MDRD: 16 ML/MIN/1.73M2
GLUCOSE BLD-MCNC: 123 MG/DL (ref 70–108)
GLUCOSE URINE: NEGATIVE MG/DL
HCT VFR BLD CALC: 40.6 % (ref 37–47)
HEMOGLOBIN: 12.2 GM/DL (ref 12–16)
IMMATURE GRANS (ABS): 0.02 THOU/MM3 (ref 0–0.07)
IMMATURE GRANULOCYTES: 0 %
INR BLD: 1 (ref 0.85–1.13)
KETONES, URINE: NEGATIVE
LEUKOCYTE ESTERASE, URINE: ABNORMAL
LYMPHOCYTES # BLD: 17.9 %
LYMPHOCYTES ABSOLUTE: 1.3 THOU/MM3 (ref 1–4.8)
MCH RBC QN AUTO: 29.2 PG (ref 26–33)
MCHC RBC AUTO-ENTMCNC: 30 GM/DL (ref 32.2–35.5)
MCV RBC AUTO: 97.1 FL (ref 81–99)
MISCELLANEOUS 2: ABNORMAL
MONOCYTES # BLD: 7.9 %
MONOCYTES ABSOLUTE: 0.6 THOU/MM3 (ref 0.4–1.3)
NITRITE, URINE: NEGATIVE
NUCLEATED RED BLOOD CELLS: 0 /100 WBC
OSMOLALITY CALCULATION: 307.9 MOSMOL/KG (ref 275–300)
OSMOLALITY URINE: 356 MOSMOL/KG (ref 250–750)
PH UA: 6 (ref 5–9)
PHOSPHORUS: 5 MG/DL (ref 2.4–4.7)
PLATELET # BLD: 187 THOU/MM3 (ref 130–400)
PMV BLD AUTO: 9.7 FL (ref 9.4–12.4)
POTASSIUM SERPL-SCNC: 4.4 MEQ/L (ref 3.5–5.2)
PRO-BNP: 2760 PG/ML (ref 0–900)
PROTEIN UA: 100
PROTEIN, URINE: 85.1 MG/DL
RBC # BLD: 4.18 MILL/MM3 (ref 4.2–5.4)
RBC URINE: ABNORMAL /HPF
RENAL EPITHELIAL, UA: ABNORMAL
SEG NEUTROPHILS: 71.2 %
SEGMENTED NEUTROPHILS ABSOLUTE COUNT: 5.1 THOU/MM3 (ref 1.8–7.7)
SODIUM BLD-SCNC: 138 MEQ/L (ref 135–145)
SODIUM URINE: 87 MEQ/L
SPECIFIC GRAVITY, URINE: 1.01 (ref 1–1.03)
TOTAL PROTEIN: 8 G/DL (ref 6.1–8)
TROPONIN T: 0.03 NG/ML
TROPONIN T: 0.05 NG/ML
TSH SERPL DL<=0.05 MIU/L-ACNC: 3.96 UIU/ML (ref 0.4–4.2)
UROBILINOGEN, URINE: 0.2 EU/DL (ref 0–1)
WBC # BLD: 7.2 THOU/MM3 (ref 4.8–10.8)
WBC UA: > 200 /HPF
YEAST: ABNORMAL

## 2019-09-16 PROCEDURE — 96361 HYDRATE IV INFUSION ADD-ON: CPT

## 2019-09-16 PROCEDURE — 36415 COLL VENOUS BLD VENIPUNCTURE: CPT

## 2019-09-16 PROCEDURE — 2580000003 HC RX 258: Performed by: INTERNAL MEDICINE

## 2019-09-16 PROCEDURE — 96360 HYDRATION IV INFUSION INIT: CPT

## 2019-09-16 PROCEDURE — 71046 X-RAY EXAM CHEST 2 VIEWS: CPT

## 2019-09-16 PROCEDURE — 85025 COMPLETE CBC W/AUTO DIFF WBC: CPT

## 2019-09-16 PROCEDURE — 80053 COMPREHEN METABOLIC PANEL: CPT

## 2019-09-16 PROCEDURE — 1200000003 HC TELEMETRY R&B

## 2019-09-16 PROCEDURE — 83935 ASSAY OF URINE OSMOLALITY: CPT

## 2019-09-16 PROCEDURE — 83880 ASSAY OF NATRIURETIC PEPTIDE: CPT

## 2019-09-16 PROCEDURE — 85610 PROTHROMBIN TIME: CPT

## 2019-09-16 PROCEDURE — 84484 ASSAY OF TROPONIN QUANT: CPT

## 2019-09-16 PROCEDURE — 87086 URINE CULTURE/COLONY COUNT: CPT

## 2019-09-16 PROCEDURE — 85730 THROMBOPLASTIN TIME PARTIAL: CPT

## 2019-09-16 PROCEDURE — 81001 URINALYSIS AUTO W/SCOPE: CPT

## 2019-09-16 PROCEDURE — 6360000002 HC RX W HCPCS: Performed by: INTERNAL MEDICINE

## 2019-09-16 PROCEDURE — 93005 ELECTROCARDIOGRAM TRACING: CPT | Performed by: FAMILY MEDICINE

## 2019-09-16 PROCEDURE — 84300 ASSAY OF URINE SODIUM: CPT

## 2019-09-16 PROCEDURE — 84100 ASSAY OF PHOSPHORUS: CPT

## 2019-09-16 PROCEDURE — 84156 ASSAY OF PROTEIN URINE: CPT

## 2019-09-16 PROCEDURE — 2580000003 HC RX 258: Performed by: FAMILY MEDICINE

## 2019-09-16 PROCEDURE — 84443 ASSAY THYROID STIM HORMONE: CPT

## 2019-09-16 PROCEDURE — 99285 EMERGENCY DEPT VISIT HI MDM: CPT

## 2019-09-16 PROCEDURE — 99223 1ST HOSP IP/OBS HIGH 75: CPT | Performed by: INTERNAL MEDICINE

## 2019-09-16 RX ORDER — ASPIRIN 81 MG/1
81 TABLET ORAL DAILY
Status: DISCONTINUED | OUTPATIENT
Start: 2019-09-17 | End: 2019-09-19 | Stop reason: HOSPADM

## 2019-09-16 RX ORDER — ACETAMINOPHEN 325 MG/1
650 TABLET ORAL EVERY 4 HOURS PRN
Status: DISCONTINUED | OUTPATIENT
Start: 2019-09-16 | End: 2019-09-19 | Stop reason: HOSPADM

## 2019-09-16 RX ORDER — SODIUM CHLORIDE 0.9 % (FLUSH) 0.9 %
10 SYRINGE (ML) INJECTION EVERY 12 HOURS SCHEDULED
Status: DISCONTINUED | OUTPATIENT
Start: 2019-09-16 | End: 2019-09-19 | Stop reason: HOSPADM

## 2019-09-16 RX ORDER — 0.9 % SODIUM CHLORIDE 0.9 %
1000 INTRAVENOUS SOLUTION INTRAVENOUS ONCE
Status: DISCONTINUED | OUTPATIENT
Start: 2019-09-16 | End: 2019-09-19 | Stop reason: HOSPADM

## 2019-09-16 RX ORDER — HEPARIN SODIUM 5000 [USP'U]/ML
5000 INJECTION, SOLUTION INTRAVENOUS; SUBCUTANEOUS EVERY 8 HOURS SCHEDULED
Status: DISCONTINUED | OUTPATIENT
Start: 2019-09-16 | End: 2019-09-19 | Stop reason: HOSPADM

## 2019-09-16 RX ORDER — 0.9 % SODIUM CHLORIDE 0.9 %
500 INTRAVENOUS SOLUTION INTRAVENOUS ONCE
Status: COMPLETED | OUTPATIENT
Start: 2019-09-16 | End: 2019-09-16

## 2019-09-16 RX ORDER — DOCUSATE SODIUM 100 MG/1
100 CAPSULE, LIQUID FILLED ORAL 2 TIMES DAILY
Status: DISCONTINUED | OUTPATIENT
Start: 2019-09-16 | End: 2019-09-19 | Stop reason: HOSPADM

## 2019-09-16 RX ORDER — ONDANSETRON 2 MG/ML
4 INJECTION INTRAMUSCULAR; INTRAVENOUS EVERY 6 HOURS PRN
Status: DISCONTINUED | OUTPATIENT
Start: 2019-09-16 | End: 2019-09-19 | Stop reason: HOSPADM

## 2019-09-16 RX ORDER — ASPIRIN 81 MG/1
81 TABLET ORAL ONCE
Status: DISCONTINUED | OUTPATIENT
Start: 2019-09-16 | End: 2019-09-16

## 2019-09-16 RX ORDER — SODIUM CHLORIDE 0.9 % (FLUSH) 0.9 %
10 SYRINGE (ML) INJECTION PRN
Status: DISCONTINUED | OUTPATIENT
Start: 2019-09-16 | End: 2019-09-19 | Stop reason: HOSPADM

## 2019-09-16 RX ORDER — SODIUM CHLORIDE 9 MG/ML
INJECTION, SOLUTION INTRAVENOUS CONTINUOUS
Status: DISCONTINUED | OUTPATIENT
Start: 2019-09-16 | End: 2019-09-19

## 2019-09-16 RX ADMIN — SODIUM CHLORIDE: 9 INJECTION, SOLUTION INTRAVENOUS at 19:16

## 2019-09-16 RX ADMIN — SODIUM CHLORIDE 500 ML: 9 INJECTION, SOLUTION INTRAVENOUS at 13:25

## 2019-09-16 RX ADMIN — HEPARIN SODIUM 5000 UNITS: 5000 INJECTION INTRAVENOUS; SUBCUTANEOUS at 21:36

## 2019-09-16 ASSESSMENT — PAIN SCALES - GENERAL: PAINLEVEL_OUTOF10: 0

## 2019-09-16 ASSESSMENT — ENCOUNTER SYMPTOMS
BACK PAIN: 0
VOMITING: 0
COUGH: 0
SORE THROAT: 0
EYE PAIN: 0
NAUSEA: 0
SHORTNESS OF BREATH: 0
RHINORRHEA: 0
WHEEZING: 0
ABDOMINAL PAIN: 0
EYE DISCHARGE: 0
DIARRHEA: 0

## 2019-09-17 LAB
ANION GAP SERPL CALCULATED.3IONS-SCNC: 13 MEQ/L (ref 8–16)
BASOPHILS # BLD: 0.5 %
BASOPHILS ABSOLUTE: 0 THOU/MM3 (ref 0–0.1)
BUN BLDV-MCNC: 92 MG/DL (ref 7–22)
CALCIUM SERPL-MCNC: 9.4 MG/DL (ref 8.5–10.5)
CHLORIDE BLD-SCNC: 101 MEQ/L (ref 98–111)
CO2: 27 MEQ/L (ref 23–33)
CREAT SERPL-MCNC: 2.2 MG/DL (ref 0.4–1.2)
EOSINOPHIL # BLD: 3.4 %
EOSINOPHILS ABSOLUTE: 0.2 THOU/MM3 (ref 0–0.4)
ERYTHROCYTE [DISTWIDTH] IN BLOOD BY AUTOMATED COUNT: 14.5 % (ref 11.5–14.5)
ERYTHROCYTE [DISTWIDTH] IN BLOOD BY AUTOMATED COUNT: 51.2 FL (ref 35–45)
GFR SERPL CREATININE-BSD FRML MDRD: 22 ML/MIN/1.73M2
GLUCOSE BLD-MCNC: 101 MG/DL (ref 70–108)
HCT VFR BLD CALC: 35.9 % (ref 37–47)
HEMOGLOBIN: 10.9 GM/DL (ref 12–16)
IMMATURE GRANS (ABS): 0.02 THOU/MM3 (ref 0–0.07)
IMMATURE GRANULOCYTES: 0 %
LYMPHOCYTES # BLD: 25.6 %
LYMPHOCYTES ABSOLUTE: 1.5 THOU/MM3 (ref 1–4.8)
MAGNESIUM: 2.1 MG/DL (ref 1.6–2.4)
MCH RBC QN AUTO: 29.4 PG (ref 26–33)
MCHC RBC AUTO-ENTMCNC: 30.4 GM/DL (ref 32.2–35.5)
MCV RBC AUTO: 96.8 FL (ref 81–99)
MONOCYTES # BLD: 10.3 %
MONOCYTES ABSOLUTE: 0.6 THOU/MM3 (ref 0.4–1.3)
NUCLEATED RED BLOOD CELLS: 0 /100 WBC
ORGANISM: ABNORMAL
PLATELET # BLD: 152 THOU/MM3 (ref 130–400)
PMV BLD AUTO: 10.1 FL (ref 9.4–12.4)
POTASSIUM REFLEX MAGNESIUM: 4.7 MEQ/L (ref 3.5–5.2)
RBC # BLD: 3.71 MILL/MM3 (ref 4.2–5.4)
SEG NEUTROPHILS: 59.9 %
SEGMENTED NEUTROPHILS ABSOLUTE COUNT: 3.5 THOU/MM3 (ref 1.8–7.7)
SODIUM BLD-SCNC: 141 MEQ/L (ref 135–145)
URINE CULTURE REFLEX: ABNORMAL
WBC # BLD: 5.9 THOU/MM3 (ref 4.8–10.8)

## 2019-09-17 PROCEDURE — 80048 BASIC METABOLIC PNL TOTAL CA: CPT

## 2019-09-17 PROCEDURE — 6370000000 HC RX 637 (ALT 250 FOR IP): Performed by: INTERNAL MEDICINE

## 2019-09-17 PROCEDURE — 85025 COMPLETE CBC W/AUTO DIFF WBC: CPT

## 2019-09-17 PROCEDURE — G0008 ADMIN INFLUENZA VIRUS VAC: HCPCS | Performed by: INTERNAL MEDICINE

## 2019-09-17 PROCEDURE — 36415 COLL VENOUS BLD VENIPUNCTURE: CPT

## 2019-09-17 PROCEDURE — 1200000003 HC TELEMETRY R&B

## 2019-09-17 PROCEDURE — 83735 ASSAY OF MAGNESIUM: CPT

## 2019-09-17 PROCEDURE — 99223 1ST HOSP IP/OBS HIGH 75: CPT | Performed by: INTERNAL MEDICINE

## 2019-09-17 PROCEDURE — 90686 IIV4 VACC NO PRSV 0.5 ML IM: CPT | Performed by: INTERNAL MEDICINE

## 2019-09-17 PROCEDURE — 99232 SBSQ HOSP IP/OBS MODERATE 35: CPT | Performed by: INTERNAL MEDICINE

## 2019-09-17 PROCEDURE — 99222 1ST HOSP IP/OBS MODERATE 55: CPT | Performed by: INTERNAL MEDICINE

## 2019-09-17 PROCEDURE — 2580000003 HC RX 258: Performed by: INTERNAL MEDICINE

## 2019-09-17 PROCEDURE — 6360000002 HC RX W HCPCS: Performed by: INTERNAL MEDICINE

## 2019-09-17 RX ADMIN — HEPARIN SODIUM 5000 UNITS: 5000 INJECTION INTRAVENOUS; SUBCUTANEOUS at 14:35

## 2019-09-17 RX ADMIN — INFLUENZA A VIRUS A/BRISBANE/02/2018 IVR-190 (H1N1) ANTIGEN (PROPIOLACTONE INACTIVATED), INFLUENZA A VIRUS A/KANSAS/14/2017 X-327 (H3N2) ANTIGEN (PROPIOLACTONE INACTIVATED), INFLUENZA B VIRUS B/MARYLAND/15/2016 ANTIGEN (PROPIOLACTONE INACTIVATED), INFLUENZA B VIRUS B/PHUKET/3073/2013 BVR-1B ANTIGEN (PROPIOLACTONE INACTIVATED) 0.5 ML: 15; 15; 15; 15 INJECTION, SUSPENSION INTRAMUSCULAR at 08:37

## 2019-09-17 RX ADMIN — HEPARIN SODIUM 5000 UNITS: 5000 INJECTION INTRAVENOUS; SUBCUTANEOUS at 22:25

## 2019-09-17 RX ADMIN — ASPIRIN 81 MG: 81 TABLET ORAL at 08:36

## 2019-09-17 RX ADMIN — DOCUSATE SODIUM 100 MG: 100 CAPSULE, LIQUID FILLED ORAL at 20:29

## 2019-09-17 RX ADMIN — HEPARIN SODIUM 5000 UNITS: 5000 INJECTION INTRAVENOUS; SUBCUTANEOUS at 06:56

## 2019-09-17 RX ADMIN — SODIUM CHLORIDE: 9 INJECTION, SOLUTION INTRAVENOUS at 11:26

## 2019-09-17 ASSESSMENT — PAIN SCALES - GENERAL
PAINLEVEL_OUTOF10: 0

## 2019-09-17 ASSESSMENT — ENCOUNTER SYMPTOMS
EYES NEGATIVE: 1
DIARRHEA: 0
SHORTNESS OF BREATH: 1
EYE PAIN: 0
ABDOMINAL PAIN: 0
COUGH: 0
NAUSEA: 0
VOMITING: 0
SORE THROAT: 0
BACK PAIN: 0

## 2019-09-18 LAB
ANION GAP SERPL CALCULATED.3IONS-SCNC: 11 MEQ/L (ref 8–16)
BUN BLDV-MCNC: 82 MG/DL (ref 7–22)
CALCIUM SERPL-MCNC: 9.4 MG/DL (ref 8.5–10.5)
CHLORIDE BLD-SCNC: 102 MEQ/L (ref 98–111)
CO2: 27 MEQ/L (ref 23–33)
CREAT SERPL-MCNC: 1.7 MG/DL (ref 0.4–1.2)
GFR SERPL CREATININE-BSD FRML MDRD: 30 ML/MIN/1.73M2
GLUCOSE BLD-MCNC: 103 MG/DL (ref 70–108)
POTASSIUM SERPL-SCNC: 4.7 MEQ/L (ref 3.5–5.2)
SODIUM BLD-SCNC: 140 MEQ/L (ref 135–145)

## 2019-09-18 PROCEDURE — 36415 COLL VENOUS BLD VENIPUNCTURE: CPT

## 2019-09-18 PROCEDURE — 6370000000 HC RX 637 (ALT 250 FOR IP): Performed by: INTERNAL MEDICINE

## 2019-09-18 PROCEDURE — 1200000003 HC TELEMETRY R&B

## 2019-09-18 PROCEDURE — 99232 SBSQ HOSP IP/OBS MODERATE 35: CPT | Performed by: INTERNAL MEDICINE

## 2019-09-18 PROCEDURE — 2580000003 HC RX 258: Performed by: INTERNAL MEDICINE

## 2019-09-18 PROCEDURE — 6360000002 HC RX W HCPCS: Performed by: INTERNAL MEDICINE

## 2019-09-18 PROCEDURE — 80048 BASIC METABOLIC PNL TOTAL CA: CPT

## 2019-09-18 RX ADMIN — HEPARIN SODIUM 5000 UNITS: 5000 INJECTION INTRAVENOUS; SUBCUTANEOUS at 05:50

## 2019-09-18 RX ADMIN — HEPARIN SODIUM 5000 UNITS: 5000 INJECTION INTRAVENOUS; SUBCUTANEOUS at 13:36

## 2019-09-18 RX ADMIN — SODIUM CHLORIDE: 9 INJECTION, SOLUTION INTRAVENOUS at 01:30

## 2019-09-18 RX ADMIN — ASPIRIN 81 MG: 81 TABLET ORAL at 09:01

## 2019-09-18 RX ADMIN — HEPARIN SODIUM 5000 UNITS: 5000 INJECTION INTRAVENOUS; SUBCUTANEOUS at 20:44

## 2019-09-18 RX ADMIN — DOCUSATE SODIUM 100 MG: 100 CAPSULE, LIQUID FILLED ORAL at 20:44

## 2019-09-18 ASSESSMENT — PAIN SCALES - GENERAL
PAINLEVEL_OUTOF10: 0
PAINLEVEL_OUTOF10: 0

## 2019-09-19 ENCOUNTER — TELEPHONE (OUTPATIENT)
Dept: FAMILY MEDICINE CLINIC | Age: 69
End: 2019-09-19

## 2019-09-19 VITALS
HEART RATE: 84 BPM | HEIGHT: 57 IN | RESPIRATION RATE: 18 BRPM | OXYGEN SATURATION: 95 % | WEIGHT: 219.1 LBS | BODY MASS INDEX: 47.27 KG/M2 | SYSTOLIC BLOOD PRESSURE: 144 MMHG | TEMPERATURE: 98.5 F | DIASTOLIC BLOOD PRESSURE: 60 MMHG

## 2019-09-19 LAB
ANION GAP SERPL CALCULATED.3IONS-SCNC: 10 MEQ/L (ref 8–16)
BUN BLDV-MCNC: 51 MG/DL (ref 7–22)
CALCIUM SERPL-MCNC: 9.6 MG/DL (ref 8.5–10.5)
CHLORIDE BLD-SCNC: 107 MEQ/L (ref 98–111)
CO2: 25 MEQ/L (ref 23–33)
CREAT SERPL-MCNC: 1.2 MG/DL (ref 0.4–1.2)
GFR SERPL CREATININE-BSD FRML MDRD: 45 ML/MIN/1.73M2
GLUCOSE BLD-MCNC: 95 MG/DL (ref 70–108)
POTASSIUM SERPL-SCNC: 4.6 MEQ/L (ref 3.5–5.2)
SODIUM BLD-SCNC: 142 MEQ/L (ref 135–145)

## 2019-09-19 PROCEDURE — 36415 COLL VENOUS BLD VENIPUNCTURE: CPT

## 2019-09-19 PROCEDURE — 97166 OT EVAL MOD COMPLEX 45 MIN: CPT

## 2019-09-19 PROCEDURE — 6370000000 HC RX 637 (ALT 250 FOR IP): Performed by: INTERNAL MEDICINE

## 2019-09-19 PROCEDURE — 99238 HOSP IP/OBS DSCHRG MGMT 30/<: CPT | Performed by: INTERNAL MEDICINE

## 2019-09-19 PROCEDURE — 97535 SELF CARE MNGMENT TRAINING: CPT

## 2019-09-19 PROCEDURE — 99232 SBSQ HOSP IP/OBS MODERATE 35: CPT | Performed by: INTERNAL MEDICINE

## 2019-09-19 PROCEDURE — 6360000002 HC RX W HCPCS: Performed by: INTERNAL MEDICINE

## 2019-09-19 PROCEDURE — 2580000003 HC RX 258: Performed by: INTERNAL MEDICINE

## 2019-09-19 PROCEDURE — 80048 BASIC METABOLIC PNL TOTAL CA: CPT

## 2019-09-19 RX ORDER — BUMETANIDE 0.5 MG/1
TABLET ORAL
Qty: 180 TABLET | Refills: 2 | Status: SHIPPED | OUTPATIENT
Start: 2019-09-19 | End: 2019-10-22

## 2019-09-19 RX ORDER — AMLODIPINE BESYLATE 5 MG/1
5 TABLET ORAL DAILY
Status: DISCONTINUED | OUTPATIENT
Start: 2019-09-19 | End: 2019-09-19 | Stop reason: HOSPADM

## 2019-09-19 RX ORDER — POLYETHYLENE GLYCOL 3350 17 G/17G
17 POWDER, FOR SOLUTION ORAL ONCE
Status: COMPLETED | OUTPATIENT
Start: 2019-09-19 | End: 2019-09-19

## 2019-09-19 RX ORDER — AMLODIPINE BESYLATE 5 MG/1
5 TABLET ORAL DAILY
Qty: 30 TABLET | Refills: 3 | Status: SHIPPED | OUTPATIENT
Start: 2019-09-19 | End: 2019-10-22

## 2019-09-19 RX ADMIN — HEPARIN SODIUM 5000 UNITS: 5000 INJECTION INTRAVENOUS; SUBCUTANEOUS at 05:39

## 2019-09-19 RX ADMIN — SODIUM CHLORIDE: 9 INJECTION, SOLUTION INTRAVENOUS at 03:34

## 2019-09-19 RX ADMIN — DOCUSATE SODIUM 100 MG: 100 CAPSULE, LIQUID FILLED ORAL at 10:00

## 2019-09-19 RX ADMIN — POLYETHYLENE GLYCOL 3350 17 G: 17 POWDER, FOR SOLUTION ORAL at 11:38

## 2019-09-19 RX ADMIN — AMLODIPINE BESYLATE 5 MG: 5 TABLET ORAL at 15:19

## 2019-09-19 RX ADMIN — ASPIRIN 81 MG: 81 TABLET ORAL at 10:00

## 2019-09-19 ASSESSMENT — PAIN SCALES - GENERAL
PAINLEVEL_OUTOF10: 0
PAINLEVEL_OUTOF10: 0

## 2019-09-20 ENCOUNTER — CARE COORDINATION (OUTPATIENT)
Dept: CASE MANAGEMENT | Age: 69
End: 2019-09-20

## 2019-09-20 NOTE — CARE COORDINATION
Geovanni 45 Transitions Initial Follow Up Call    Call within 2 business days of discharge: Yes    Patient: Zoila Henriquez Patient : 1950   MRN: [id number]  Reason for Admission: Hypotension due to hypovolemia   Discharge Date: 19 RARS: Readmission Risk Score: 16      Last Discharge [facility]       Complaint Diagnosis Description Type Department Provider    19 Hypotension; Fatigue ALVIN (acute kidney injury) ([facility] [address] . .. ED to Hosp-Admission (Discharged) (ADMITTED) JORDON Glaser DO; Linda Navarro. .. Spoke with: Oumar [address]: [facility]    Non-face-to-face services provided:  Obtained and reviewed discharge summary and/or continuity of care documents    Care Transitions 24 Hour Call    Do you have any ongoing symptoms?:  No  Do you have a copy of your discharge instructions?:  Yes  Do you have all of your prescriptions and are they filled?:  Yes  Have you scheduled your follow up appointment?:  Yes  How are you going to get to your appointment?:  Car - family or friend to transport  Were you discharged with any Home Care or Post Acute Services:  No  Do you feel like you have everything you need to keep you well at home?:  Yes  Care Transitions Interventions  No Identified Needs     Called pt for the care transition initial follow up call, explained the role of the CTN. Pt stated she feels \"100% better\" than when she was admitted. Pt stated her appetite has improved. Pt denied any nausea and vomiting. Pt stated she does not add any salt to her food. Pt denied any chest pain, SOB, wheezing, palpitations or swelling. Pt stated her weight today 221.6 #   Reminded pt to call Pulm or PCP if gain 3 #/day or 5 #/ week. Pt stated her sister is an LPN & checks the pt bp. Pt stated she was unsure what the BP was today \"must have been okay, she did not say anything\"    Pt sister manages meds, pt only knows a couple meds.   Pt stated she knew she had a new med & some meds were stopped    Reminded pt she will need a blood test prior Neph appt  10/22/19    Reviewed upcoming, pt stated her sister will drive. Pt denied any needs or concerns. CTC will continue to follow.     Follow Up  Future Appointments   Date Time Provider Shira Carrilloi   9/25/2019  9:00 AM Sierra Muller MD Pulm Med [facility] [place]   9/26/2019  3:15 PM Apolonia Lopez MD [facility] - [place]   9/30/2019 10:30 AM Ashely Diego PA-C [facility] [place]   10/22/2019  2:20 PM Reddy Henry MD  KIDNEY P - [place]   10/23/2019 11:00 AM [address] - CNP [facility] CHF P - [place]   11/27/2019 12:00 PM Nacho Mishra MD [address] [facility] - Christine Miller RN  Care Transition Coordinator  [phone]

## 2019-09-23 ENCOUNTER — CARE COORDINATION (OUTPATIENT)
Dept: CASE MANAGEMENT | Age: 69
End: 2019-09-23

## 2019-09-25 ENCOUNTER — INITIAL CONSULT (OUTPATIENT)
Dept: PULMONOLOGY | Age: 69
End: 2019-09-25
Payer: MEDICARE

## 2019-09-25 VITALS
DIASTOLIC BLOOD PRESSURE: 70 MMHG | SYSTOLIC BLOOD PRESSURE: 124 MMHG | BODY MASS INDEX: 48.54 KG/M2 | OXYGEN SATURATION: 94 % | HEIGHT: 57 IN | WEIGHT: 225 LBS | HEART RATE: 60 BPM

## 2019-09-25 DIAGNOSIS — I10 ESSENTIAL HYPERTENSION: ICD-10-CM

## 2019-09-25 DIAGNOSIS — I27.20 PULMONARY HYPERTENSION (HCC): ICD-10-CM

## 2019-09-25 DIAGNOSIS — F32.A DEPRESSION, UNSPECIFIED DEPRESSION TYPE: ICD-10-CM

## 2019-09-25 DIAGNOSIS — I50.32 CHRONIC DIASTOLIC CHF (CONGESTIVE HEART FAILURE) (HCC): ICD-10-CM

## 2019-09-25 DIAGNOSIS — G47.30 SLEEP APNEA, UNSPECIFIED TYPE: Primary | ICD-10-CM

## 2019-09-25 PROCEDURE — G8417 CALC BMI ABV UP PARAM F/U: HCPCS | Performed by: INTERNAL MEDICINE

## 2019-09-25 PROCEDURE — 1036F TOBACCO NON-USER: CPT | Performed by: INTERNAL MEDICINE

## 2019-09-25 PROCEDURE — 1111F DSCHRG MED/CURRENT MED MERGE: CPT | Performed by: INTERNAL MEDICINE

## 2019-09-25 PROCEDURE — 4040F PNEUMOC VAC/ADMIN/RCVD: CPT | Performed by: INTERNAL MEDICINE

## 2019-09-25 PROCEDURE — G8427 DOCREV CUR MEDS BY ELIG CLIN: HCPCS | Performed by: INTERNAL MEDICINE

## 2019-09-25 PROCEDURE — 1090F PRES/ABSN URINE INCON ASSESS: CPT | Performed by: INTERNAL MEDICINE

## 2019-09-25 PROCEDURE — 99203 OFFICE O/P NEW LOW 30 MIN: CPT | Performed by: INTERNAL MEDICINE

## 2019-09-25 PROCEDURE — G8400 PT W/DXA NO RESULTS DOC: HCPCS | Performed by: INTERNAL MEDICINE

## 2019-09-25 PROCEDURE — 1123F ACP DISCUSS/DSCN MKR DOCD: CPT | Performed by: INTERNAL MEDICINE

## 2019-09-25 PROCEDURE — 3017F COLORECTAL CA SCREEN DOC REV: CPT | Performed by: INTERNAL MEDICINE

## 2019-09-25 NOTE — PROGRESS NOTES
[facility] [address] Initial Consultation    Laine Brizuela                                             Chief Complaint: Sleep consult referred by Dr. Renan Bergman for shortness of breath, and snoring. No prior studies per patient. Chief complaint: Laine Brizuela is a 81 y. o.oldfemale came for further evaluation regarding her ?sleep apnea  with referral from Dr. Dana Lynch pulmonary hypertension clinic. [place]:    Sleep/Wake schedule:  Usual time to go to bed during the work/regular day of week: Varies - at different times. She usually sleeps in a recliner. Usual time to wake up during the work//regular day of week: 4:00 AM.  Over the weekends her sleep schedule: [x] Remain same. She usually falls a sleep in less than: ~60minutes. She takes naps: Yes. Number of naps per week:  7 times. During each nap she spends a total of: 1hours  The naps were reported as refreshing: No.     Sleep Hygiene:    Is the temperature and evironment in her bed room is acceptable to her: Yes. She watches Television in her bed room: Yes. She read books, study, pay bills etc in the bed: No.  Frequency She wake up during night/sleep: 3  Majority of nocturnal awakenings are for urination: Yes. Difficulty in falling back to sleep after nocturnal awakenings: Yes- some times. .  Do you drink coffee: No.   Do you drink caffeinated beverages i.e sodas: Yes. 2 can/s per day. Barry Molina.   Do you drink tea:No.     Do you drink alcoholic beverages: No.  History of recreational drug use: No.     History of tobacco smoking:No.        Sleep apnea symptoms:  Noticed to have loud snoring:No.  Witnessed apneas during sleep noticed: No.   History of choking and gasping sensation at night time: No.  History of headaches in the morning:No.  History of dry mouth in the morning: Yes- some times. History of palpitations during night time/nocturnal awakenings: No.  History of sweating during night time/nocturnal awakenings: No    General:  History of head injury in the past: No.   History of seizures: No.   Rest less legs syndrome symptoms:NO  History suggestive of periodic limb movements during sleep: NO  History suggestive of hypnagogic hallucinations: NO  History suggestive of hypnopompic hallucinations: NO  History suggestive of sleep talking: Yes  History suggestive of sleep walking:NO  History suggestive of bruxism: No.  History suggestive of cataplexy: NO  History suggestive of sleep paralysis: NO    Family history of sleep disorders:  Family history of obstructive sleep apnea: Yes. Family member diagnosed with obstructive sleep apnea brother. Family member using PAP therapy:  Yes. Family history of Narcolepsy: No.   Family history of Rest less legs syndrome : No.     History regarding old sleep studies:  Prior history of sleep study: No.  Using CPAP device: No.  Currently using home Oxygen: NO.        Patient considerations:  Is the patient is ambulatory: Yes  Patient is currently using: None of these Wheelchair, Lubertha Evener or Leon Oris. Para/Quadriplegic: NO  Hearing deficit : NO  Claustrophobic: NO  MDD : NO  Blind: NO  Incontinent: NO  Para/Quadraplegi: NO.   Need transportation to and from Sleep Center:NO      Social History:  Social History     Tobacco Use    Smoking status: Never Smoker    Smokeless tobacco: Never Used   Substance Use Topics    Alcohol use: No    Drug use: No   .  She is currently working: No.       [x] Retired.                            Past Medical History:   Diagnosis Date    Depression     Hypertension     Osteoarthritis     Thrombocytopenia ([facility] [address]        Past Surgical History:   Procedure Laterality Date    APPENDECTOMY      FACIAL NERVE SURGERY      1989       No

## 2019-09-26 ENCOUNTER — OFFICE VISIT (OUTPATIENT)
Dept: FAMILY MEDICINE CLINIC | Age: 69
End: 2019-09-26
Payer: MEDICARE

## 2019-09-26 VITALS
HEART RATE: 74 BPM | BODY MASS INDEX: 48.43 KG/M2 | DIASTOLIC BLOOD PRESSURE: 70 MMHG | WEIGHT: 223.8 LBS | RESPIRATION RATE: 16 BRPM | SYSTOLIC BLOOD PRESSURE: 136 MMHG

## 2019-09-26 DIAGNOSIS — Z12.39 SCREENING FOR BREAST CANCER: ICD-10-CM

## 2019-09-26 DIAGNOSIS — F33.1 MODERATE EPISODE OF RECURRENT MAJOR DEPRESSIVE DISORDER (HCC): ICD-10-CM

## 2019-09-26 DIAGNOSIS — I50.43 ACUTE ON CHRONIC COMBINED SYSTOLIC AND DIASTOLIC CHF (CONGESTIVE HEART FAILURE) (HCC): Primary | ICD-10-CM

## 2019-09-26 DIAGNOSIS — N17.9 AKI (ACUTE KIDNEY INJURY) (HCC): ICD-10-CM

## 2019-09-26 PROCEDURE — 3017F COLORECTAL CA SCREEN DOC REV: CPT | Performed by: FAMILY MEDICINE

## 2019-09-26 PROCEDURE — 1111F DSCHRG MED/CURRENT MED MERGE: CPT | Performed by: FAMILY MEDICINE

## 2019-09-26 PROCEDURE — G8400 PT W/DXA NO RESULTS DOC: HCPCS | Performed by: FAMILY MEDICINE

## 2019-09-26 PROCEDURE — 1123F ACP DISCUSS/DSCN MKR DOCD: CPT | Performed by: FAMILY MEDICINE

## 2019-09-26 PROCEDURE — 1036F TOBACCO NON-USER: CPT | Performed by: FAMILY MEDICINE

## 2019-09-26 PROCEDURE — 4040F PNEUMOC VAC/ADMIN/RCVD: CPT | Performed by: FAMILY MEDICINE

## 2019-09-26 PROCEDURE — 1090F PRES/ABSN URINE INCON ASSESS: CPT | Performed by: FAMILY MEDICINE

## 2019-09-26 PROCEDURE — 99214 OFFICE O/P EST MOD 30 MIN: CPT | Performed by: FAMILY MEDICINE

## 2019-09-26 PROCEDURE — G8417 CALC BMI ABV UP PARAM F/U: HCPCS | Performed by: FAMILY MEDICINE

## 2019-09-26 PROCEDURE — G8427 DOCREV CUR MEDS BY ELIG CLIN: HCPCS | Performed by: FAMILY MEDICINE

## 2019-09-26 RX ORDER — FLUOXETINE HYDROCHLORIDE 40 MG/1
40 CAPSULE ORAL DAILY
Qty: 90 CAPSULE | Refills: 3 | Status: SHIPPED | OUTPATIENT
Start: 2019-09-26 | End: 2020-09-11 | Stop reason: SDUPTHER

## 2019-09-26 ASSESSMENT — ENCOUNTER SYMPTOMS
NAUSEA: 0
RHINORRHEA: 0
SORE THROAT: 0
DIARRHEA: 0
SHORTNESS OF BREATH: 0
CHEST TIGHTNESS: 0
BACK PAIN: 0
ABDOMINAL PAIN: 0
EYE PAIN: 0
WHEEZING: 0
BLOOD IN STOOL: 0
COUGH: 0
CONSTIPATION: 0
VOMITING: 0

## 2019-09-26 NOTE — PATIENT INSTRUCTIONS
You may receive a survey about your visit with us today. The feedback from our patients helps us identify what is working well and where the service to all patients can be enhanced. Thank you! Patient Education        Depression Treatment: Care Instructions  Your Care Instructions    Depression is a condition that affects the way you feel, think, and act. It causes symptoms such as low energy, loss of interest in daily activities, and sadness or grouchiness that goes on for a long time. Depression is very common and affects men and women of all ages. Depression is a medical illness caused by changes in the natural chemicals in your brain. It is not a character flaw, and it does not mean that you are a bad or weak person. It does not mean that you are going crazy. It is important to know that depression can be treated. Medicines, counseling, and self-care can all help. Many people do not get help because they are embarrassed or think that they will get over the depression on their own. But some people do not get better without treatment. Follow-up care is a key part of your treatment and safety. Be sure to make and go to all appointments, and call your doctor if you are having problems. It's also a good idea to know your test results and keep a list of the medicines you take. How can you care for yourself at home? Learn about antidepressant medicines  Antidepressant medicines can improve or end the symptoms of depression. You may need to take the medicine for at least 6 months, and often longer. Keep taking your medicine even if you feel better. If you stop taking it too soon, your symptoms may come back or get worse. You may start to feel better within 1 to 3 weeks of taking antidepressant medicine. But it can take as many as 6 to 8 weeks to see more improvement. Talk to your doctor if you have problems with your medicine or if you do not notice any improvement after 3 weeks.   Antidepressants can make you [facility] ([facility]). If you have questions about a medical condition or this instruction, always ask your healthcare professional. [place] [address] any warranty or liability for your use of this information.

## 2019-09-30 ENCOUNTER — CARE COORDINATION (OUTPATIENT)
Dept: CASE MANAGEMENT | Age: 69
End: 2019-09-30

## 2019-09-30 NOTE — CARE COORDINATION
Geovanni 45 Transitions Follow Up Call    2019    Patient: Jim Townsend  Patient : 1950   MRN: [id number]  Reason for Admission: ALVIN (acute kidney injury) [facility]) / Hypotension  Discharge Date: 19 RARS: Readmission Risk Score: 16    Spoke with: Kenya Alarcon for sub Care Transition follow up. Identified self/role. Care Transitions Subsequent and Final Call    Subsequent and Final Calls  Do you have any ongoing symptoms?:  Yes  Patient-reported symptoms:  Shortness of Breath, Other  Have your medications changed?:  Yes  Patient Reports:  Stopped: Citalopram. New: Prozac 40 mg QD. Do you have any questions related to your medications?:  No  Do you currently have any active services?:  No  Do you have any needs or concerns that I can assist you with?:  No  Identified Barriers:  None  Care Transitions Interventions  No Identified Needs  Other Interventions:          Patient states she is doing well. Patient states SOB is improving and BLE swelling has \"gone way down\". Patient denies chest pain, fever, chills, cough, and N/V/D/C. Patient states she is eating/drinking. State she monitors salt intake. Sister monitors blood pressure. States current weight 220 lbs, down 3 lbs since MELA visit. Patient is current with Pulmonary HTN Clinic at [facility] and [address] [place] [address]. Reviewed CHF zone tool, states she no longer has a home copy. Agreeable for CHF zone tool and nutrition information to be mailed to residence. Patient states she understands to contact CHF Clinic for weight gain of 3 lbs in 1 day or 5 lbs in 1 week, increase in SOB from baseline, and increase in swelling from baseline. Reviewed upcoming appointments, verbalized understanding. Patient denies additional needs or concerns at this time. Patient instructed to notify Pre-Service or CTN for any new or worsening symptoms, contact information provided. Patient verbalized understanding. CTN will continue to follow.      Follow Up  Future

## 2019-10-03 ENCOUNTER — CARE COORDINATION (OUTPATIENT)
Dept: CASE MANAGEMENT | Age: 69
End: 2019-10-03

## 2019-10-04 ENCOUNTER — CARE COORDINATION (OUTPATIENT)
Dept: CASE MANAGEMENT | Age: 69
End: 2019-10-04

## 2019-10-07 ENCOUNTER — OFFICE VISIT (OUTPATIENT)
Dept: CARDIOLOGY CLINIC | Age: 69
End: 2019-10-07
Payer: MEDICARE

## 2019-10-07 VITALS
SYSTOLIC BLOOD PRESSURE: 138 MMHG | HEIGHT: 57 IN | HEART RATE: 64 BPM | WEIGHT: 222 LBS | DIASTOLIC BLOOD PRESSURE: 72 MMHG | BODY MASS INDEX: 47.9 KG/M2

## 2019-10-07 DIAGNOSIS — I27.20 PULMONARY HTN (HCC): Primary | ICD-10-CM

## 2019-10-07 DIAGNOSIS — I10 ESSENTIAL HYPERTENSION: ICD-10-CM

## 2019-10-07 DIAGNOSIS — I50.812 CHRONIC RIGHT-SIDED HEART FAILURE (HCC): ICD-10-CM

## 2019-10-07 PROCEDURE — 4040F PNEUMOC VAC/ADMIN/RCVD: CPT | Performed by: PHYSICIAN ASSISTANT

## 2019-10-07 PROCEDURE — 1090F PRES/ABSN URINE INCON ASSESS: CPT | Performed by: PHYSICIAN ASSISTANT

## 2019-10-07 PROCEDURE — 1111F DSCHRG MED/CURRENT MED MERGE: CPT | Performed by: PHYSICIAN ASSISTANT

## 2019-10-07 PROCEDURE — 1036F TOBACCO NON-USER: CPT | Performed by: PHYSICIAN ASSISTANT

## 2019-10-07 PROCEDURE — 3017F COLORECTAL CA SCREEN DOC REV: CPT | Performed by: PHYSICIAN ASSISTANT

## 2019-10-07 PROCEDURE — 1123F ACP DISCUSS/DSCN MKR DOCD: CPT | Performed by: PHYSICIAN ASSISTANT

## 2019-10-07 PROCEDURE — G8400 PT W/DXA NO RESULTS DOC: HCPCS | Performed by: PHYSICIAN ASSISTANT

## 2019-10-07 PROCEDURE — G8427 DOCREV CUR MEDS BY ELIG CLIN: HCPCS | Performed by: PHYSICIAN ASSISTANT

## 2019-10-07 PROCEDURE — G8482 FLU IMMUNIZE ORDER/ADMIN: HCPCS | Performed by: PHYSICIAN ASSISTANT

## 2019-10-07 PROCEDURE — 99213 OFFICE O/P EST LOW 20 MIN: CPT | Performed by: PHYSICIAN ASSISTANT

## 2019-10-07 PROCEDURE — G8417 CALC BMI ABV UP PARAM F/U: HCPCS | Performed by: PHYSICIAN ASSISTANT

## 2019-10-09 ENCOUNTER — HOSPITAL ENCOUNTER (OUTPATIENT)
Dept: MAMMOGRAPHY | Age: 69
Discharge: HOME | End: 2019-10-09
Payer: MEDICARE

## 2019-10-09 DIAGNOSIS — Z12.39 SCREENING FOR BREAST CANCER: ICD-10-CM

## 2019-10-09 DIAGNOSIS — Z12.31 BREAST CANCER SCREENING BY MAMMOGRAM: ICD-10-CM

## 2019-10-09 PROCEDURE — 77063 BREAST TOMOSYNTHESIS BI: CPT

## 2019-10-16 ENCOUNTER — HOSPITAL ENCOUNTER (OUTPATIENT)
Dept: WOMENS IMAGING | Age: 69
Discharge: HOME | End: 2019-10-16
Payer: MEDICARE

## 2019-10-16 DIAGNOSIS — R92.2 BREAST DENSITY: ICD-10-CM

## 2019-10-16 PROBLEM — R77.8 ELEVATED TROPONIN: Status: RESOLVED | Noted: 2019-09-16 | Resolved: 2019-10-16

## 2019-10-16 PROBLEM — R79.89 ELEVATED TROPONIN: Status: RESOLVED | Noted: 2019-09-16 | Resolved: 2019-10-16

## 2019-10-16 PROCEDURE — G0279 TOMOSYNTHESIS, MAMMO: HCPCS

## 2019-10-16 PROCEDURE — 76642 ULTRASOUND BREAST LIMITED: CPT

## 2019-10-18 ENCOUNTER — HOSPITAL ENCOUNTER (OUTPATIENT)
Age: 69
Discharge: HOME | End: 2019-10-18
Payer: MEDICARE

## 2019-10-18 DIAGNOSIS — I10 ESSENTIAL HYPERTENSION: ICD-10-CM

## 2019-10-18 DIAGNOSIS — F32.A DEPRESSION, UNSPECIFIED DEPRESSION TYPE: ICD-10-CM

## 2019-10-18 DIAGNOSIS — N17.9 ACUTE RENAL FAILURE SUPERIMPOSED ON STAGE 4 CHRONIC KIDNEY DISEASE, UNSPECIFIED ACUTE RENAL FAILURE TYPE (HCC): ICD-10-CM

## 2019-10-18 DIAGNOSIS — G47.30 SLEEP APNEA, UNSPECIFIED TYPE: ICD-10-CM

## 2019-10-18 DIAGNOSIS — N18.4 ACUTE RENAL FAILURE SUPERIMPOSED ON STAGE 4 CHRONIC KIDNEY DISEASE, UNSPECIFIED ACUTE RENAL FAILURE TYPE (HCC): ICD-10-CM

## 2019-10-18 DIAGNOSIS — I50.32 CHRONIC DIASTOLIC CHF (CONGESTIVE HEART FAILURE) (HCC): ICD-10-CM

## 2019-10-18 DIAGNOSIS — I27.20 PULMONARY HYPERTENSION (HCC): ICD-10-CM

## 2019-10-18 LAB
ANION GAP SERPL CALCULATED.3IONS-SCNC: 10 MEQ/L (ref 8–16)
BUN BLDV-MCNC: 25 MG/DL (ref 7–22)
CALCIUM SERPL-MCNC: 10.2 MG/DL (ref 8.5–10.5)
CHLORIDE BLD-SCNC: 102 MEQ/L (ref 98–111)
CO2: 28 MEQ/L (ref 23–33)
CREAT SERPL-MCNC: 0.7 MG/DL (ref 0.4–1.2)
GFR SERPL CREATININE-BSD FRML MDRD: 83 ML/MIN/1.73M2
GLUCOSE BLD-MCNC: 98 MG/DL (ref 70–108)
POTASSIUM SERPL-SCNC: 4.2 MEQ/L (ref 3.5–5.2)
SODIUM BLD-SCNC: 140 MEQ/L (ref 135–145)

## 2019-10-18 PROCEDURE — 80048 BASIC METABOLIC PNL TOTAL CA: CPT

## 2019-10-18 PROCEDURE — 36415 COLL VENOUS BLD VENIPUNCTURE: CPT

## 2019-10-22 ENCOUNTER — OFFICE VISIT (OUTPATIENT)
Dept: NEPHROLOGY | Age: 69
End: 2019-10-22
Payer: MEDICARE

## 2019-10-22 VITALS
HEART RATE: 68 BPM | SYSTOLIC BLOOD PRESSURE: 138 MMHG | BODY MASS INDEX: 47.61 KG/M2 | WEIGHT: 220 LBS | DIASTOLIC BLOOD PRESSURE: 78 MMHG | OXYGEN SATURATION: 96 %

## 2019-10-22 DIAGNOSIS — R80.0 ISOLATED NON-NEPHROTIC PROTEINURIA: ICD-10-CM

## 2019-10-22 DIAGNOSIS — E87.79 OTHER FLUID OVERLOAD: ICD-10-CM

## 2019-10-22 DIAGNOSIS — I10 ESSENTIAL HYPERTENSION: ICD-10-CM

## 2019-10-22 DIAGNOSIS — N18.2 CKD (CHRONIC KIDNEY DISEASE) STAGE 2, GFR 60-89 ML/MIN: Primary | ICD-10-CM

## 2019-10-22 PROCEDURE — 1036F TOBACCO NON-USER: CPT | Performed by: INTERNAL MEDICINE

## 2019-10-22 PROCEDURE — G8482 FLU IMMUNIZE ORDER/ADMIN: HCPCS | Performed by: INTERNAL MEDICINE

## 2019-10-22 PROCEDURE — 3017F COLORECTAL CA SCREEN DOC REV: CPT | Performed by: INTERNAL MEDICINE

## 2019-10-22 PROCEDURE — 99213 OFFICE O/P EST LOW 20 MIN: CPT | Performed by: INTERNAL MEDICINE

## 2019-10-22 PROCEDURE — G8417 CALC BMI ABV UP PARAM F/U: HCPCS | Performed by: INTERNAL MEDICINE

## 2019-10-22 PROCEDURE — 1090F PRES/ABSN URINE INCON ASSESS: CPT | Performed by: INTERNAL MEDICINE

## 2019-10-22 PROCEDURE — 1123F ACP DISCUSS/DSCN MKR DOCD: CPT | Performed by: INTERNAL MEDICINE

## 2019-10-22 PROCEDURE — G8427 DOCREV CUR MEDS BY ELIG CLIN: HCPCS | Performed by: INTERNAL MEDICINE

## 2019-10-22 PROCEDURE — G8400 PT W/DXA NO RESULTS DOC: HCPCS | Performed by: INTERNAL MEDICINE

## 2019-10-22 PROCEDURE — 4040F PNEUMOC VAC/ADMIN/RCVD: CPT | Performed by: INTERNAL MEDICINE

## 2019-10-22 RX ORDER — BUMETANIDE 0.5 MG/1
0.5 TABLET ORAL PRN
COMMUNITY
End: 2019-10-22

## 2019-10-22 RX ORDER — BUMETANIDE 0.5 MG/1
0.5 TABLET ORAL DAILY
Qty: 30 TABLET | Refills: 0 | Status: SHIPPED | OUTPATIENT
Start: 2019-10-22 | End: 2019-12-17 | Stop reason: DRUGHIGH

## 2019-10-23 ENCOUNTER — OFFICE VISIT (OUTPATIENT)
Dept: CARDIOLOGY CLINIC | Age: 69
End: 2019-10-23
Payer: MEDICARE

## 2019-10-23 VITALS
WEIGHT: 221 LBS | SYSTOLIC BLOOD PRESSURE: 130 MMHG | HEIGHT: 57 IN | DIASTOLIC BLOOD PRESSURE: 70 MMHG | BODY MASS INDEX: 47.68 KG/M2 | HEART RATE: 63 BPM | OXYGEN SATURATION: 98 %

## 2019-10-23 DIAGNOSIS — I50.32 CHF (CONGESTIVE HEART FAILURE), NYHA CLASS II, CHRONIC, DIASTOLIC (HCC): Primary | ICD-10-CM

## 2019-10-23 DIAGNOSIS — I27.20 PULMONARY HTN (HCC): ICD-10-CM

## 2019-10-23 DIAGNOSIS — I10 ESSENTIAL HYPERTENSION: ICD-10-CM

## 2019-10-23 PROCEDURE — 1090F PRES/ABSN URINE INCON ASSESS: CPT | Performed by: NURSE PRACTITIONER

## 2019-10-23 PROCEDURE — 99213 OFFICE O/P EST LOW 20 MIN: CPT | Performed by: NURSE PRACTITIONER

## 2019-10-23 PROCEDURE — 4040F PNEUMOC VAC/ADMIN/RCVD: CPT | Performed by: NURSE PRACTITIONER

## 2019-10-23 PROCEDURE — G8427 DOCREV CUR MEDS BY ELIG CLIN: HCPCS | Performed by: NURSE PRACTITIONER

## 2019-10-23 PROCEDURE — 1123F ACP DISCUSS/DSCN MKR DOCD: CPT | Performed by: NURSE PRACTITIONER

## 2019-10-23 PROCEDURE — G8482 FLU IMMUNIZE ORDER/ADMIN: HCPCS | Performed by: NURSE PRACTITIONER

## 2019-10-23 PROCEDURE — G8400 PT W/DXA NO RESULTS DOC: HCPCS | Performed by: NURSE PRACTITIONER

## 2019-10-23 PROCEDURE — 1036F TOBACCO NON-USER: CPT | Performed by: NURSE PRACTITIONER

## 2019-10-23 PROCEDURE — G8417 CALC BMI ABV UP PARAM F/U: HCPCS | Performed by: NURSE PRACTITIONER

## 2019-10-23 PROCEDURE — 3017F COLORECTAL CA SCREEN DOC REV: CPT | Performed by: NURSE PRACTITIONER

## 2019-10-23 RX ORDER — ENALAPRIL MALEATE 2.5 MG/1
2.5 TABLET ORAL DAILY
Qty: 30 TABLET | Refills: 3 | Status: SHIPPED | OUTPATIENT
Start: 2019-10-23 | End: 2020-01-29 | Stop reason: SDUPTHER

## 2019-10-23 ASSESSMENT — ENCOUNTER SYMPTOMS
SHORTNESS OF BREATH: 1
WHEEZING: 0
COUGH: 0
APNEA: 0
ABDOMINAL PAIN: 0
NAUSEA: 0
CHEST TIGHTNESS: 0
ABDOMINAL DISTENTION: 0
COLOR CHANGE: 0

## 2019-10-24 ENCOUNTER — HOSPITAL ENCOUNTER (OUTPATIENT)
Dept: WOMENS IMAGING | Age: 69
Discharge: HOME | End: 2019-10-24
Payer: MEDICARE

## 2019-10-24 DIAGNOSIS — N63.0 BREAST MASS: ICD-10-CM

## 2019-10-24 PROCEDURE — C1894 INTRO/SHEATH, NON-LASER: HCPCS

## 2019-10-24 PROCEDURE — 19084 BX BREAST ADD LESION US IMAG: CPT

## 2019-10-24 PROCEDURE — 77065 DX MAMMO INCL CAD UNI: CPT

## 2019-10-24 PROCEDURE — A4648 IMPLANTABLE TISSUE MARKER: HCPCS

## 2019-10-24 PROCEDURE — 88305 TISSUE EXAM BY PATHOLOGIST: CPT

## 2019-10-24 PROCEDURE — 2709999900 HC NON-CHARGEABLE SUPPLY

## 2019-10-24 PROCEDURE — 19083 BX BREAST 1ST LESION US IMAG: CPT

## 2019-10-29 ENCOUNTER — HOSPITAL ENCOUNTER (OUTPATIENT)
Dept: ULTRASOUND IMAGING | Age: 69
Discharge: HOME | End: 2019-10-29
Payer: MEDICARE

## 2019-10-29 DIAGNOSIS — E87.79 OTHER FLUID OVERLOAD: ICD-10-CM

## 2019-10-29 DIAGNOSIS — R80.0 ISOLATED NON-NEPHROTIC PROTEINURIA: ICD-10-CM

## 2019-10-29 DIAGNOSIS — N18.2 CKD (CHRONIC KIDNEY DISEASE) STAGE 2, GFR 60-89 ML/MIN: ICD-10-CM

## 2019-10-29 DIAGNOSIS — I10 ESSENTIAL HYPERTENSION: ICD-10-CM

## 2019-10-29 PROCEDURE — 76770 US EXAM ABDO BACK WALL COMP: CPT

## 2019-10-30 ENCOUNTER — TELEPHONE (OUTPATIENT)
Dept: NEPHROLOGY | Age: 69
End: 2019-10-30

## 2019-10-31 ENCOUNTER — HOSPITAL ENCOUNTER (OUTPATIENT)
Age: 69
Discharge: HOME | End: 2019-10-31
Payer: MEDICARE

## 2019-10-31 DIAGNOSIS — I10 ESSENTIAL HYPERTENSION: ICD-10-CM

## 2019-10-31 DIAGNOSIS — E87.79 OTHER FLUID OVERLOAD: ICD-10-CM

## 2019-10-31 DIAGNOSIS — R80.0 ISOLATED NON-NEPHROTIC PROTEINURIA: ICD-10-CM

## 2019-10-31 DIAGNOSIS — N18.2 CKD (CHRONIC KIDNEY DISEASE) STAGE 2, GFR 60-89 ML/MIN: ICD-10-CM

## 2019-10-31 LAB
ANION GAP SERPL CALCULATED.3IONS-SCNC: 14 MEQ/L (ref 8–16)
BUN BLDV-MCNC: 33 MG/DL (ref 7–22)
CALCIUM SERPL-MCNC: 10 MG/DL (ref 8.5–10.5)
CHLORIDE BLD-SCNC: 102 MEQ/L (ref 98–111)
CO2: 26 MEQ/L (ref 23–33)
CREAT SERPL-MCNC: 0.8 MG/DL (ref 0.4–1.2)
GFR SERPL CREATININE-BSD FRML MDRD: 72 ML/MIN/1.73M2
GLUCOSE BLD-MCNC: 102 MG/DL (ref 70–108)
POTASSIUM SERPL-SCNC: 4 MEQ/L (ref 3.5–5.2)
SODIUM BLD-SCNC: 142 MEQ/L (ref 135–145)

## 2019-10-31 PROCEDURE — 80048 BASIC METABOLIC PNL TOTAL CA: CPT

## 2019-10-31 PROCEDURE — 36415 COLL VENOUS BLD VENIPUNCTURE: CPT

## 2019-11-05 ENCOUNTER — TELEPHONE (OUTPATIENT)
Dept: SLEEP CENTER | Age: 69
End: 2019-11-05

## 2019-11-07 ENCOUNTER — TELEPHONE (OUTPATIENT)
Dept: CARDIOLOGY CLINIC | Age: 69
End: 2019-11-07

## 2019-11-07 ENCOUNTER — HOSPITAL ENCOUNTER (OUTPATIENT)
Dept: SLEEP CENTER | Age: 69
Discharge: HOME | End: 2019-11-09
Payer: MEDICARE

## 2019-11-07 PROCEDURE — 95810 POLYSOM 6/> YRS 4/> PARAM: CPT

## 2019-11-08 LAB — STATUS: NORMAL

## 2019-11-11 ENCOUNTER — OFFICE VISIT (OUTPATIENT)
Dept: PULMONOLOGY | Age: 69
End: 2019-11-11
Payer: MEDICARE

## 2019-11-11 VITALS
OXYGEN SATURATION: 92 % | BODY MASS INDEX: 47.59 KG/M2 | DIASTOLIC BLOOD PRESSURE: 72 MMHG | TEMPERATURE: 98.4 F | SYSTOLIC BLOOD PRESSURE: 122 MMHG | HEART RATE: 66 BPM | HEIGHT: 57 IN | WEIGHT: 220.6 LBS

## 2019-11-11 DIAGNOSIS — Z72.821 POOR SLEEP HYGIENE: ICD-10-CM

## 2019-11-11 DIAGNOSIS — G47.20 CIRCADIAN RHYTHM SLEEP DISORDER: ICD-10-CM

## 2019-11-11 DIAGNOSIS — I27.21 PULMONARY ARTERY HYPERTENSION (HCC): ICD-10-CM

## 2019-11-11 DIAGNOSIS — G47.33 OSA (OBSTRUCTIVE SLEEP APNEA): Primary | ICD-10-CM

## 2019-11-11 PROCEDURE — G8400 PT W/DXA NO RESULTS DOC: HCPCS | Performed by: NURSE PRACTITIONER

## 2019-11-11 PROCEDURE — 1090F PRES/ABSN URINE INCON ASSESS: CPT | Performed by: NURSE PRACTITIONER

## 2019-11-11 PROCEDURE — 99214 OFFICE O/P EST MOD 30 MIN: CPT | Performed by: NURSE PRACTITIONER

## 2019-11-11 PROCEDURE — 3017F COLORECTAL CA SCREEN DOC REV: CPT | Performed by: NURSE PRACTITIONER

## 2019-11-11 PROCEDURE — G8417 CALC BMI ABV UP PARAM F/U: HCPCS | Performed by: NURSE PRACTITIONER

## 2019-11-11 PROCEDURE — 4040F PNEUMOC VAC/ADMIN/RCVD: CPT | Performed by: NURSE PRACTITIONER

## 2019-11-11 PROCEDURE — G8427 DOCREV CUR MEDS BY ELIG CLIN: HCPCS | Performed by: NURSE PRACTITIONER

## 2019-11-11 PROCEDURE — 1123F ACP DISCUSS/DSCN MKR DOCD: CPT | Performed by: NURSE PRACTITIONER

## 2019-11-11 PROCEDURE — 1036F TOBACCO NON-USER: CPT | Performed by: NURSE PRACTITIONER

## 2019-11-11 PROCEDURE — G8482 FLU IMMUNIZE ORDER/ADMIN: HCPCS | Performed by: NURSE PRACTITIONER

## 2019-12-17 ENCOUNTER — OFFICE VISIT (OUTPATIENT)
Dept: CARDIOLOGY CLINIC | Age: 69
End: 2019-12-17
Payer: MEDICARE

## 2019-12-17 VITALS
OXYGEN SATURATION: 95 % | WEIGHT: 212.4 LBS | SYSTOLIC BLOOD PRESSURE: 124 MMHG | DIASTOLIC BLOOD PRESSURE: 72 MMHG | BODY MASS INDEX: 45.96 KG/M2 | HEART RATE: 59 BPM

## 2019-12-17 DIAGNOSIS — I50.32 CHF (CONGESTIVE HEART FAILURE), NYHA CLASS II, CHRONIC, DIASTOLIC (HCC): Primary | ICD-10-CM

## 2019-12-17 DIAGNOSIS — I27.20 PULMONARY HTN (HCC): ICD-10-CM

## 2019-12-17 LAB
ANION GAP SERPL CALCULATED.3IONS-SCNC: 13 MEQ/L (ref 8–16)
BUN BLDV-MCNC: 39 MG/DL (ref 7–22)
CALCIUM SERPL-MCNC: 9.9 MG/DL (ref 8.5–10.5)
CHLORIDE BLD-SCNC: 98 MEQ/L (ref 98–111)
CO2: 30 MEQ/L (ref 23–33)
CREAT SERPL-MCNC: 1 MG/DL (ref 0.4–1.2)
GFR SERPL CREATININE-BSD FRML MDRD: 55 ML/MIN/1.73M2
GLUCOSE BLD-MCNC: 96 MG/DL (ref 70–108)
POTASSIUM SERPL-SCNC: 4.5 MEQ/L (ref 3.5–5.2)
SODIUM BLD-SCNC: 141 MEQ/L (ref 135–145)

## 2019-12-17 PROCEDURE — 99213 OFFICE O/P EST LOW 20 MIN: CPT | Performed by: NURSE PRACTITIONER

## 2019-12-17 PROCEDURE — 3017F COLORECTAL CA SCREEN DOC REV: CPT | Performed by: NURSE PRACTITIONER

## 2019-12-17 PROCEDURE — G8482 FLU IMMUNIZE ORDER/ADMIN: HCPCS | Performed by: NURSE PRACTITIONER

## 2019-12-17 PROCEDURE — 1090F PRES/ABSN URINE INCON ASSESS: CPT | Performed by: NURSE PRACTITIONER

## 2019-12-17 PROCEDURE — 1036F TOBACCO NON-USER: CPT | Performed by: NURSE PRACTITIONER

## 2019-12-17 PROCEDURE — 1123F ACP DISCUSS/DSCN MKR DOCD: CPT | Performed by: NURSE PRACTITIONER

## 2019-12-17 PROCEDURE — 36415 COLL VENOUS BLD VENIPUNCTURE: CPT | Performed by: NURSE PRACTITIONER

## 2019-12-17 PROCEDURE — 4040F PNEUMOC VAC/ADMIN/RCVD: CPT | Performed by: NURSE PRACTITIONER

## 2019-12-17 PROCEDURE — G8417 CALC BMI ABV UP PARAM F/U: HCPCS | Performed by: NURSE PRACTITIONER

## 2019-12-17 PROCEDURE — G8400 PT W/DXA NO RESULTS DOC: HCPCS | Performed by: NURSE PRACTITIONER

## 2019-12-17 PROCEDURE — G8427 DOCREV CUR MEDS BY ELIG CLIN: HCPCS | Performed by: NURSE PRACTITIONER

## 2019-12-17 RX ORDER — BUMETANIDE 1 MG/1
2 TABLET ORAL
COMMUNITY
Start: 2019-12-09 | End: 2019-12-17

## 2019-12-17 RX ORDER — BUMETANIDE 1 MG/1
1 TABLET ORAL DAILY
Qty: 30 TABLET | Refills: 2
Start: 2019-12-17 | End: 2020-02-17

## 2019-12-17 ASSESSMENT — ENCOUNTER SYMPTOMS
SHORTNESS OF BREATH: 1
APNEA: 0
NAUSEA: 0
ABDOMINAL PAIN: 0
COUGH: 0
WHEEZING: 0
COLOR CHANGE: 0
CHEST TIGHTNESS: 0
ABDOMINAL DISTENTION: 0

## 2019-12-18 ENCOUNTER — TELEPHONE (OUTPATIENT)
Dept: CARDIOLOGY CLINIC | Age: 69
End: 2019-12-18

## 2019-12-18 DIAGNOSIS — I50.32 CHF (CONGESTIVE HEART FAILURE), NYHA CLASS II, CHRONIC, DIASTOLIC (HCC): Primary | ICD-10-CM

## 2020-01-16 ENCOUNTER — TELEPHONE (OUTPATIENT)
Dept: CARDIOLOGY CLINIC | Age: 70
End: 2020-01-16

## 2020-01-16 NOTE — TELEPHONE ENCOUNTER
Bishop Motts called and pt weight is up about 3 pounds at least. On Bumex 2 mg daily. Due for labs Saturday. Pt has an increase in swelling in her feet, pt has a dry cough occasional. Pt lungs are clear, but feels an abdominal fullness, and fatigued.

## 2020-01-17 NOTE — TELEPHONE ENCOUNTER
Sister called in  [address] Brianna gave orders  3mg bumex for 2 days then back to 2 mg daily  Labs on Wed or Thurs   Patient is voiding now and not feeling any pressure

## 2020-01-22 ENCOUNTER — HOSPITAL ENCOUNTER (OUTPATIENT)
Age: 70
Discharge: HOME | End: 2020-01-22
Payer: MEDICARE

## 2020-01-22 LAB
ANION GAP SERPL CALCULATED.3IONS-SCNC: 15 MEQ/L (ref 8–16)
BUN BLDV-MCNC: 49 MG/DL (ref 7–22)
CHLORIDE BLD-SCNC: 88 MEQ/L (ref 98–111)
CO2: 24 MEQ/L (ref 23–33)
CREAT SERPL-MCNC: 1.4 MG/DL (ref 0.4–1.2)
GFR SERPL CREATININE-BSD FRML MDRD: 37 ML/MIN/1.73M2
POTASSIUM SERPL-SCNC: 4 MEQ/L (ref 3.5–5.2)
PRO-BNP: 1457 PG/ML (ref 0–900)
SODIUM BLD-SCNC: 127 MEQ/L (ref 135–145)

## 2020-01-22 PROCEDURE — 36415 COLL VENOUS BLD VENIPUNCTURE: CPT

## 2020-01-22 PROCEDURE — 82565 ASSAY OF CREATININE: CPT

## 2020-01-22 PROCEDURE — 83880 ASSAY OF NATRIURETIC PEPTIDE: CPT

## 2020-01-22 PROCEDURE — 80051 ELECTROLYTE PANEL: CPT

## 2020-01-22 PROCEDURE — 84520 ASSAY OF UREA NITROGEN: CPT

## 2020-01-23 ENCOUNTER — HOSPITAL ENCOUNTER (OUTPATIENT)
Dept: PULMONOLOGY | Age: 70
Discharge: HOME | End: 2020-01-23
Payer: MEDICARE

## 2020-01-23 PROCEDURE — 94060 EVALUATION OF WHEEZING: CPT

## 2020-01-23 PROCEDURE — 94729 DIFFUSING CAPACITY: CPT

## 2020-01-23 NOTE — TELEPHONE ENCOUNTER
Patients sister called again and states Dannielle weight is 220.6 which back in December was around 211 and has creeped up over the last month. Pt has 1+ edema in her feet, occasional dry cough, abdominal fullness and has vomited today. Completed her 3 days of 3 mg bumex as ordered by Dr Brian Jacobson, and is on bumex 2 mg daily and had labs yesterday. She states they also have a call out to Dr Brian Jacobson at [facility] as well.

## 2020-01-23 NOTE — TELEPHONE ENCOUNTER
Thank you for the update  Again since it has only been 1 week and they have been dealing with [facility] I will let them decide on POC

## 2020-01-24 ENCOUNTER — APPOINTMENT (OUTPATIENT)
Dept: INTERVENTIONAL RADIOLOGY/VASCULAR | Age: 70
End: 2020-01-24
Payer: MEDICARE

## 2020-01-24 ENCOUNTER — TELEPHONE (OUTPATIENT)
Dept: CARDIOLOGY CLINIC | Age: 70
End: 2020-01-24

## 2020-01-24 ENCOUNTER — HOSPITAL ENCOUNTER (EMERGENCY)
Age: 70
Discharge: HOME | End: 2020-01-24
Attending: FAMILY MEDICINE
Payer: MEDICARE

## 2020-01-24 ENCOUNTER — APPOINTMENT (OUTPATIENT)
Dept: GENERAL RADIOLOGY | Age: 70
End: 2020-01-24
Payer: MEDICARE

## 2020-01-24 VITALS
SYSTOLIC BLOOD PRESSURE: 110 MMHG | TEMPERATURE: 98.1 F | RESPIRATION RATE: 16 BRPM | HEIGHT: 57 IN | DIASTOLIC BLOOD PRESSURE: 44 MMHG | OXYGEN SATURATION: 96 % | HEART RATE: 86 BPM | BODY MASS INDEX: 45.74 KG/M2 | WEIGHT: 212 LBS

## 2020-01-24 LAB
ALBUMIN SERPL-MCNC: 3.6 G/DL (ref 3.5–5.1)
ALP BLD-CCNC: 89 U/L (ref 38–126)
ALT SERPL-CCNC: 9 U/L (ref 11–66)
ANION GAP SERPL CALCULATED.3IONS-SCNC: 12 MEQ/L (ref 8–16)
APTT: 38.4 SECONDS (ref 22–38)
AST SERPL-CCNC: 16 U/L (ref 5–40)
BASOPHILS # BLD: 0.4 %
BASOPHILS ABSOLUTE: 0 THOU/MM3 (ref 0–0.1)
BILIRUB SERPL-MCNC: 0.5 MG/DL (ref 0.3–1.2)
BUN BLDV-MCNC: 49 MG/DL (ref 7–22)
CALCIUM SERPL-MCNC: 9.1 MG/DL (ref 8.5–10.5)
CHLORIDE BLD-SCNC: 93 MEQ/L (ref 98–111)
CO2: 24 MEQ/L (ref 23–33)
CREAT SERPL-MCNC: 1.5 MG/DL (ref 0.4–1.2)
EKG ATRIAL RATE: 72 BPM
EKG P AXIS: 51 DEGREES
EKG P-R INTERVAL: 172 MS
EKG Q-T INTERVAL: 462 MS
EKG QRS DURATION: 102 MS
EKG QTC CALCULATION (BAZETT): 505 MS
EKG R AXIS: 98 DEGREES
EKG T AXIS: 12 DEGREES
EKG VENTRICULAR RATE: 72 BPM
EOSINOPHIL # BLD: 1.1 %
EOSINOPHILS ABSOLUTE: 0.1 THOU/MM3 (ref 0–0.4)
ERYTHROCYTE [DISTWIDTH] IN BLOOD BY AUTOMATED COUNT: 12.7 % (ref 11.5–14.5)
ERYTHROCYTE [DISTWIDTH] IN BLOOD BY AUTOMATED COUNT: 42.3 FL (ref 35–45)
GFR SERPL CREATININE-BSD FRML MDRD: 35 ML/MIN/1.73M2
GLUCOSE BLD-MCNC: 97 MG/DL (ref 70–108)
HCT VFR BLD CALC: 33.3 % (ref 37–47)
HEMOGLOBIN: 10.6 GM/DL (ref 12–16)
IMMATURE GRANS (ABS): 0.02 THOU/MM3 (ref 0–0.07)
IMMATURE GRANULOCYTES: 0.4 %
INR BLD: 1.09 (ref 0.85–1.13)
LYMPHOCYTES # BLD: 14.7 %
LYMPHOCYTES ABSOLUTE: 0.7 THOU/MM3 (ref 1–4.8)
MCH RBC QN AUTO: 29.3 PG (ref 26–33)
MCHC RBC AUTO-ENTMCNC: 31.8 GM/DL (ref 32.2–35.5)
MCV RBC AUTO: 92 FL (ref 81–99)
MONOCYTES # BLD: 10.2 %
MONOCYTES ABSOLUTE: 0.5 THOU/MM3 (ref 0.4–1.3)
NUCLEATED RED BLOOD CELLS: 0 /100 WBC
OSMOLALITY CALCULATION: 271.8 MOSMOL/KG (ref 275–300)
PLATELET # BLD: 101 THOU/MM3 (ref 130–400)
PMV BLD AUTO: 9.3 FL (ref 9.4–12.4)
POTASSIUM REFLEX MAGNESIUM: 4.1 MEQ/L (ref 3.5–5.2)
PRO-BNP: 1443 PG/ML (ref 0–900)
RBC # BLD: 3.62 MILL/MM3 (ref 4.2–5.4)
SEG NEUTROPHILS: 73.2 %
SEGMENTED NEUTROPHILS ABSOLUTE COUNT: 3.4 THOU/MM3 (ref 1.8–7.7)
SODIUM BLD-SCNC: 129 MEQ/L (ref 135–145)
TOTAL PROTEIN: 7 G/DL (ref 6.1–8)
TROPONIN T: 0.04 NG/ML
WBC # BLD: 4.6 THOU/MM3 (ref 4.8–10.8)

## 2020-01-24 PROCEDURE — 71045 X-RAY EXAM CHEST 1 VIEW: CPT

## 2020-01-24 PROCEDURE — 2709999900 HC NON-CHARGEABLE SUPPLY

## 2020-01-24 PROCEDURE — 85730 THROMBOPLASTIN TIME PARTIAL: CPT

## 2020-01-24 PROCEDURE — 99284 EMERGENCY DEPT VISIT MOD MDM: CPT

## 2020-01-24 PROCEDURE — 36415 COLL VENOUS BLD VENIPUNCTURE: CPT

## 2020-01-24 PROCEDURE — 80053 COMPREHEN METABOLIC PANEL: CPT

## 2020-01-24 PROCEDURE — 85025 COMPLETE CBC W/AUTO DIFF WBC: CPT

## 2020-01-24 PROCEDURE — 96374 THER/PROPH/DIAG INJ IV PUSH: CPT

## 2020-01-24 PROCEDURE — 93970 EXTREMITY STUDY: CPT

## 2020-01-24 PROCEDURE — 83880 ASSAY OF NATRIURETIC PEPTIDE: CPT

## 2020-01-24 PROCEDURE — 2500000003 HC RX 250 WO HCPCS: Performed by: FAMILY MEDICINE

## 2020-01-24 PROCEDURE — 93005 ELECTROCARDIOGRAM TRACING: CPT | Performed by: FAMILY MEDICINE

## 2020-01-24 PROCEDURE — 85610 PROTHROMBIN TIME: CPT

## 2020-01-24 PROCEDURE — 84484 ASSAY OF TROPONIN QUANT: CPT

## 2020-01-24 RX ORDER — BUMETANIDE 0.25 MG/ML
1 INJECTION, SOLUTION INTRAMUSCULAR; INTRAVENOUS ONCE
Status: COMPLETED | OUTPATIENT
Start: 2020-01-24 | End: 2020-01-24

## 2020-01-24 RX ADMIN — BUMETANIDE 1 MG: 0.25 INJECTION INTRAMUSCULAR; INTRAVENOUS at 14:25

## 2020-01-24 NOTE — ED PROVIDER NOTES
EMERGENCY DEPARTMENT ENCOUNTER     CHIEF COMPLAINT   Chief Complaint   Patient presents with    Leg Swelling        HPI   Kimberly Burton is a 81 y.o. female with known hx of CHF secondary to pulmonary hypertension (RA pressure 50 mm Hg on latest cath), on several new medications (Adempas or riociguat for chronic pulmonary arterial HTN 0.5 mg tid; and a new medication Laterus? For past 30 days, which has purportedly caused her more swelling and weight gain), who presents with shortness of breath and worsening LE edema, even some on face, onset was last month. PT got worse so she came to the ED for evaluation. The duration has been constant. She normally weighs 210 lbs, and is 220 lbs today. She also normally has dyspnea on exertion due to her condition, so it's not any worse. REVIEW OF SYSTEMS   Cardiac: +dyspnea on exertion; denies Chest Pain, Denies syncope   Respiratory: +sob; Denies cough or hemoptysis   GI: Denies Vomiting or Diarrhea   General: Denies Fever   MSK: +LE edema, worsening; +weight gain  All other review of systems are reviewed and are otherwise negative.      PAST MEDICAL & SURGICAL HISTORY   Past Medical History:   Diagnosis Date    Depression     Hypertension     Osteoarthritis     Pulmonary artery hypertension ([facility] [address]     Thrombocytopenia (HCC)       Past Surgical History:   Procedure Laterality Date    APPENDECTOMY      FACIAL NERVE SURGERY      1989        CURRENT MEDICATIONS   Current Outpatient Rx   Medication Sig Dispense Refill    bumetanide (BUMEX) 1 MG tablet Take 1 tablet by mouth daily 30 tablet 2    Melatonin 3 MG CAPS Take 3 mg by mouth nightly (Patient taking differently: Take 3 mg by mouth nightly as needed ) 30 capsule 0    enalapril (VASOTEC) 2.5 MG tablet Take 1 tablet by mouth daily 30 tablet 3    metoprolol tartrate (LOPRESSOR) 25 MG tablet Take 0.5 tablets by mouth 2 times daily 90 tablet 3    FLUoxetine (PROZAC) 40 MG capsule Take 1 capsule by mouth daily 90 capsule 3    aspirin 81 MG tablet Take 81 mg by mouth daily       Multiple Vitamins-Minerals (MULTI-VITAMIN GUMMIES PO) Take 2 tablets by mouth daily           ALLERGIES   No Known Allergies     SOCIAL & FAMILY HISTORY   Social History     Socioeconomic History    Marital status: Single     Spouse name: None    Number of children: 0    Years of education: None    Highest education level: None   Occupational History    None   Social Needs    Financial resource strain: None    Food insecurity:     Worry: None     Inability: None    Transportation needs:     Medical: None     Non-medical: None   Tobacco Use    Smoking status: Never Smoker    Smokeless tobacco: Never Used   Substance and Sexual Activity    Alcohol use: No    Drug use: No    Sexual activity: Not Currently   Lifestyle    Physical activity:     Days per week: None     Minutes per session: None    Stress: None   Relationships    Social connections:     Talks on phone: None     Gets together: None     Attends Christian service: None     Active member of club or organization: None     Attends meetings of clubs or organizations: None     Relationship status: None    Intimate partner violence:     Fear of current or ex partner: None     Emotionally abused: None     Physically abused: None     Forced sexual activity: None   Other Topics Concern    None   Social History Narrative    None      Family History   Problem Relation Age of Onset    Diabetes Father     Arthritis Mother     COPD Mother     Diabetes Sister     Heart Disease Maternal Uncle     Breast Cancer Niece 36    Sleep Apnea Brother     Asthma Neg Hx     Birth Defects Neg Hx     Cancer Neg Hx     Depression Neg Hx     Early Death Neg Hx     Hearing Loss Neg Hx     High Blood Pressure Neg Hx     High Cholesterol Neg Hx     Kidney Disease Neg Hx     Learning Disabilities Neg Hx     Mental Illness Neg Hx     Mental Retardation Neg Hx     Miscarriages / Stillbirths Neg Hx     Stroke Neg Hx     Substance Abuse Neg Hx     Vision Loss Neg Hx     Other Neg Hx         PHYSICAL EXAM   VITAL SIGNS: BP (!) 110/44   Pulse 86   Temp 98.1 °F (36.7 °C) (Oral)   Resp 16   Ht 4' 9\" (1.448 m)   Wt 212 lb (96.2 kg)   SpO2 96%   BMI 45.88 kg/m²    Constitutional: Well developed, well nourished, mild acute distress   HENT: Atraumatic, moist mucus membranes   Neck: supple, no JVD   Respiratory: Lungs showed decreased bibasilar breath sounds, shallow breathing/mild tachypnea, no retractions   Cardiovascular: Reg rate, no murmurs   Vascular: Radial pulses 2+ equal bilaterally   GI: Soft, nontender, normal bowel sounds   Musculoskeletal: 3+ bilateral pitting edema with mild calf discomfort on both LE without any circumferential differential, no deformities   Integument: Skin warm and dry, no erythema or lymphadema on LE, no petechiae   Neurologic: Alert & oriented, normal speech   Psych: Pleasant affect, no hallucinations     EKG (interpreted by me) 1/24/2020 14:17  Rhythm: Sinus   Rate: 72 BPM   Axis: normal   Conduction: normal   ST/T Segments: nonacute, rightward axis    RADIOLOGY/PROCEDURES   VL DUP LOWER EXTREMITY VENOUS BILATERAL   Final Result      1. Negative exam for lower extremity DVT. 2. Nonspecific edema of the soft tissues. **This report has been created using voice recognition software. It may contain minor errors which are inherent in voice recognition technology. **      Final report electronically signed by Dr. Griffin Burkett on 1/24/2020 2:00 PM      XR CHEST PORTABLE   Final Result   1. Stable mild cardiac enlargement. 2. Diffuse coarse interstitial markings with crowding. Mild central venous congestion is not excluded. 3. Stable appearing degenerative changes of the skeleton. **This report has been created using voice recognition software. It may contain minor errors which are inherent in voice recognition technology. **      Final Plan: PT has known pulmonary hypertension, being managed at [facility] by pulmonologist Dr. Chino Buenrostro. Pt will benefit from some diuresis. To her credit, she is pleasant and not in extremis. Pt does not have any hypoxemia though her cardiac/lung capacity is definitely low at baseline. We will give pt some gentle diuresis coupled with cardiac workup including bilateral LE DVT study. Re-assessment at 1530 - pt doing better with IV bumex, as she did produce lots of urine. Her eval is notable for elevated BNP and very mild trop bump, consistent with her ongoing PHTN. Her CXR also confirmed interstitial edema. I do not think she necessarily require admission though I did offer her admission. Pt did suggest desire to go home. Via shared medical decision making, we will send pt home with instructions to stop her new medications, and to return to the ED if her symptoms worsen.     Electronically signed by: Henri Brower MD, 1/24/2020 3:43 PM   (This note was completed with a voice recognition program)        Uriah Hansen MD  01/24/20 2090

## 2020-01-24 NOTE — ED NOTES
Pt resting in bed with side rails up 2x2 and call light in reach. Vital signs assessed and stable. IV site checked. Pt updated on plan of care. Pt voiced understanding. Pt verbalized no other needs or concerns at this time.         Fracisco Moreau RN  01/24/20 1040

## 2020-01-27 ENCOUNTER — CARE COORDINATION (OUTPATIENT)
Dept: CASE MANAGEMENT | Age: 70
End: 2020-01-27

## 2020-01-27 ENCOUNTER — TELEPHONE (OUTPATIENT)
Dept: FAMILY MEDICINE CLINIC | Age: 70
End: 2020-01-27

## 2020-01-27 NOTE — TELEPHONE ENCOUNTER
She went to [address] [place] [address] for IV diuresis and was sent home    Please call and get an update today    Thank you

## 2020-01-27 NOTE — TELEPHONE ENCOUNTER
Sister Emilia Houser called with an update. Pt weight Saturday was 218, Sunday and Monday was up a pound at 219. Pt denies SOB or cough, no increase in fatigue, swelling in feet slightly better. On bumex 2mg po daily. She stopped her lateris, but not much inprovement.

## 2020-01-27 NOTE — CARE COORDINATION
[address] [facility] ED Follow Up Call    2020    Patient: Laura Mcdaniels Patient : 1950   MRN: [id number]  Reason for Admission: leg swelling  Discharge Date: 20        First attempt to reach pt for the ED F/U call. Left message to call transition care nurse from [facility] @ [phone].

## 2020-01-28 ENCOUNTER — CARE COORDINATION (OUTPATIENT)
Dept: CASE MANAGEMENT | Age: 70
End: 2020-01-28

## 2020-01-28 NOTE — CARE COORDINATION
[address] [facility] ED Follow Up Call    2020    Patient: Avery Villa Patient : 1950   MRN: [id number]  Reason for ED Visit: BLE Swelling     Second and final attempt to contact patient for Care Transition ED follow up. Message left for patient to return call. Contact information for Pre-Service and CTN provided.     Future Appointments   Date Time Provider Shira Ross   2020  9:45 AM [address] [place] - CNP [facility] [place]   3/25/2020  3:15 PM Audra Pedroza MD [address] [place] [facility] - [place]   2020  2:40 PM Jeffrey Walters MD [facility] [place], RN  Care Transition Nurse  [phone]  21

## 2020-01-29 ENCOUNTER — OFFICE VISIT (OUTPATIENT)
Dept: CARDIOLOGY CLINIC | Age: 70
End: 2020-01-29
Payer: MEDICARE

## 2020-01-29 VITALS
DIASTOLIC BLOOD PRESSURE: 64 MMHG | SYSTOLIC BLOOD PRESSURE: 126 MMHG | HEART RATE: 64 BPM | BODY MASS INDEX: 46.74 KG/M2 | OXYGEN SATURATION: 93 % | WEIGHT: 216 LBS

## 2020-01-29 PROCEDURE — 3017F COLORECTAL CA SCREEN DOC REV: CPT | Performed by: NURSE PRACTITIONER

## 2020-01-29 PROCEDURE — 1123F ACP DISCUSS/DSCN MKR DOCD: CPT | Performed by: NURSE PRACTITIONER

## 2020-01-29 PROCEDURE — G8417 CALC BMI ABV UP PARAM F/U: HCPCS | Performed by: NURSE PRACTITIONER

## 2020-01-29 PROCEDURE — G8482 FLU IMMUNIZE ORDER/ADMIN: HCPCS | Performed by: NURSE PRACTITIONER

## 2020-01-29 PROCEDURE — 1036F TOBACCO NON-USER: CPT | Performed by: NURSE PRACTITIONER

## 2020-01-29 PROCEDURE — 99213 OFFICE O/P EST LOW 20 MIN: CPT | Performed by: NURSE PRACTITIONER

## 2020-01-29 PROCEDURE — G8400 PT W/DXA NO RESULTS DOC: HCPCS | Performed by: NURSE PRACTITIONER

## 2020-01-29 PROCEDURE — 1090F PRES/ABSN URINE INCON ASSESS: CPT | Performed by: NURSE PRACTITIONER

## 2020-01-29 PROCEDURE — 4040F PNEUMOC VAC/ADMIN/RCVD: CPT | Performed by: NURSE PRACTITIONER

## 2020-01-29 PROCEDURE — G8427 DOCREV CUR MEDS BY ELIG CLIN: HCPCS | Performed by: NURSE PRACTITIONER

## 2020-01-29 RX ORDER — RIOCIGUAT 1 MG/1
2 TABLET, FILM COATED ORAL 3 TIMES DAILY
COMMUNITY
Start: 2020-01-10 | End: 2020-06-10

## 2020-01-29 RX ORDER — ENALAPRIL MALEATE 2.5 MG/1
2.5 TABLET ORAL DAILY
Qty: 90 TABLET | Refills: 3 | Status: SHIPPED | OUTPATIENT
Start: 2020-01-29 | End: 2020-06-25

## 2020-01-29 ASSESSMENT — ENCOUNTER SYMPTOMS
WHEEZING: 0
ABDOMINAL PAIN: 0
COUGH: 0
COLOR CHANGE: 0
SHORTNESS OF BREATH: 1
NAUSEA: 0
CHEST TIGHTNESS: 0
ABDOMINAL DISTENTION: 0
APNEA: 0

## 2020-01-29 NOTE — PATIENT INSTRUCTIONS
You may receive a survey regarding the care you received during your visit. Your input is valuable to us. We encourage you to complete and return your survey. We hope you will choose us in the future for your healthcare needs.     Continue:  · Take medications as prescribed   · Daily weights and record  · Fluid restriction of 2 Liters per day  · Limit sodium in diet to around 7274-7735 mg/day  · Monitor BP  · Activity as tolerated     Call the Heart Failure Clinic for any of the following symptoms: [phone]   Weight gain of 3 pounds in 1 day or 5 pounds in 1 week   Increased shortness of breath   Shortness of breath while laying down   Cough   Chest pain   Swelling in feet, ankles or legs   Tenderness or bloating in the abdomen   Fatigue    Decreased appetite or nausea    Confusion     PLEASE bring all medications in original bottles with you to your next appointment     Bumex 2 mg twice a day for 2 more days then return to 1 mg twice a day  Blood work on Monday Feb 3   Increase Potassium rich foods for 2 days with extra Bumex     Wrap legs on during the day and off at night

## 2020-01-29 NOTE — PROGRESS NOTES
90/29/51 mmHg   PCWP 15 mmHg   Lesley CO 4.3 L/min   Lesley CI 2.3 L/min/m2   PVR 8.4 Wood units     Saturations    RA 68 %   PA 61 %     Sana Vasodilator Challenge 40 ppm   PA 88/29/49 mmHg   Lesley CO 4.9 L/min   Lesley CI 2.6 L/min/m2   PVR 6.9 Wood units     Impression  · Elevated mRAP   · Severely elevated pulmonary artery pressure  · Normal PCWP  · No evidence of left to right shunt  · Low normal cardiac output/index  · Negative inhaled nitric oxide vasodilator challenge    Plan  · PAH WHO Group I -- Idiopathic (ongoing eval)  · Exertional Dyspnea NYHA Functional Classification III  · Recommend further diuresis and follow up labs in one week  · Low sodium diet and daily standing weights  · Initiate riociguat with standard up titration  · Initiate ambrisentan 5 mg daily and up titrate to 10 mg daily in two   weeks  · Clinic follow up in 2-3 months  · Complete evaluation with full PFT and labs including RF, RYLAN, HIV ab      Accompanied by: sister Vidya Arango  Last hospital admission related to Heart Failure:  none  Chest Pain: no  Worsening SOB: had but has improved some   Worsening Orthopnea/PND: no  Edema: yes  Any extra diuretic use: yes see hpi  Weight gain: yes   Fatigue: yes  Abdominal bloating: some  Appetite: good  BARBER: no  Cough: no  Compliant checking home weight: yes  Compliant checking blood pressure: yes      Past Medical History:   Diagnosis Date    Depression     Hypertension     Osteoarthritis     [facility]     Thrombocytopenia ([facility]      Past Surgical History:   Procedure Laterality Date    APPENDECTOMY      FACIAL NERVE SURGERY      1989     Family History   Problem Relation Age of Onset    Diabetes Father    Everyovany Bond Arthritis Mother     COPD Mother     Diabetes Sister     Heart Disease Maternal Uncle     Breast Cancer Niece 36    Sleep Apnea Brother     Asthma Neg Hx     Birth Defects Neg Hx     Cancer Neg Hx     Depression Neg Hx     Early Death Neg Hx     Hearing Loss Neurological: Negative for dizziness, numbness and headaches. Psychiatric/Behavioral: Negative for agitation, confusion and sleep disturbance. The patient is not nervous/anxious. OBJECTIVE:   Today's Vitals:  /64   Pulse 64   Wt 216 lb (98 kg)   SpO2 93%   BMI 46.74 kg/m²     Physical Exam  Vitals signs reviewed. Constitutional:       Appearance: She is well-developed. HENT:      Head: Normocephalic and atraumatic. Eyes:      Pupils: Pupils are equal, round, and reactive to light. Neck:      Musculoskeletal: Normal range of motion. Vascular: No JVD. Cardiovascular:      Rate and Rhythm: Normal rate and regular rhythm. Heart sounds: Normal heart sounds. No murmur. Pulmonary:      Effort: Pulmonary effort is normal. No respiratory distress. Breath sounds: No rales. Abdominal:      General: There is no distension. Palpations: Abdomen is soft. Tenderness: There is no abdominal tenderness. Musculoskeletal:         General: No tenderness. Right lower leg: Edema (1-2+) present. Left lower leg: Edema (1-2+) present. Skin:     General: Skin is warm and dry. Capillary Refill: Capillary refill takes less than 2 seconds. Neurological:      Mental Status: She is alert and oriented to person, place, and time. Psychiatric:         Mood and Affect: Mood normal.         Behavior: Behavior normal.         Wt Readings from Last 3 Encounters:   01/29/20 216 lb (98 kg)   01/24/20 212 lb (96.2 kg)   12/17/19 212 lb 6.4 oz (96.3 kg)     BP Readings from Last 3 Encounters:   01/29/20 126/64   01/24/20 (!) 110/44   12/17/19 124/72     Pulse Readings from Last 3 Encounters:   01/29/20 64   01/24/20 86   12/17/19 59     Body mass index is 46.74 kg/m². ECHO:   7/31/19  Summary   Ejection fraction was estimated at 55-60%. Left atrial size was severely dilated. The right ventricular size was severely dilated with severely reduced   function.  There is septal

## 2020-02-03 ENCOUNTER — HOSPITAL ENCOUNTER (OUTPATIENT)
Age: 70
Discharge: HOME | End: 2020-02-03
Payer: MEDICARE

## 2020-02-03 DIAGNOSIS — I50.32 CHF (CONGESTIVE HEART FAILURE), NYHA CLASS II, CHRONIC, DIASTOLIC (HCC): ICD-10-CM

## 2020-02-03 LAB
ANION GAP SERPL CALCULATED.3IONS-SCNC: 14 MEQ/L (ref 8–16)
BUN BLDV-MCNC: 55 MG/DL (ref 7–22)
CALCIUM SERPL-MCNC: 9.5 MG/DL (ref 8.5–10.5)
CHLORIDE BLD-SCNC: 96 MEQ/L (ref 98–111)
CO2: 31 MEQ/L (ref 23–33)
CREAT SERPL-MCNC: 1.8 MG/DL (ref 0.4–1.2)
GFR SERPL CREATININE-BSD FRML MDRD: 28 ML/MIN/1.73M2
GLUCOSE BLD-MCNC: 111 MG/DL (ref 70–108)
POTASSIUM SERPL-SCNC: 4.3 MEQ/L (ref 3.5–5.2)
SODIUM BLD-SCNC: 141 MEQ/L (ref 135–145)

## 2020-02-03 PROCEDURE — 36415 COLL VENOUS BLD VENIPUNCTURE: CPT

## 2020-02-03 PROCEDURE — 80048 BASIC METABOLIC PNL TOTAL CA: CPT

## 2020-02-04 ENCOUNTER — TELEPHONE (OUTPATIENT)
Dept: CARDIOLOGY CLINIC | Age: 70
End: 2020-02-04

## 2020-02-04 NOTE — TELEPHONE ENCOUNTER
Sister Sonya Pennington notified of BMP results  She reported her weight is down to 207 lbs and her swelling has improved, still has some on the tops of her feet but better  We will recheck a BMP at her OV with me on Feb 17

## 2020-02-17 ENCOUNTER — OFFICE VISIT (OUTPATIENT)
Dept: CARDIOLOGY CLINIC | Age: 70
End: 2020-02-17
Payer: MEDICARE

## 2020-02-17 ENCOUNTER — TELEPHONE (OUTPATIENT)
Dept: CARDIOLOGY CLINIC | Age: 70
End: 2020-02-17

## 2020-02-17 VITALS
BODY MASS INDEX: 43.93 KG/M2 | WEIGHT: 203 LBS | OXYGEN SATURATION: 93 % | HEART RATE: 68 BPM | SYSTOLIC BLOOD PRESSURE: 98 MMHG | DIASTOLIC BLOOD PRESSURE: 50 MMHG

## 2020-02-17 LAB
ANION GAP SERPL CALCULATED.3IONS-SCNC: 13 MEQ/L (ref 8–16)
BUN BLDV-MCNC: 57 MG/DL (ref 7–22)
CALCIUM SERPL-MCNC: 9.9 MG/DL (ref 8.5–10.5)
CHLORIDE BLD-SCNC: 98 MEQ/L (ref 98–111)
CO2: 28 MEQ/L (ref 23–33)
CREAT SERPL-MCNC: 1.9 MG/DL (ref 0.4–1.2)
GFR SERPL CREATININE-BSD FRML MDRD: 26 ML/MIN/1.73M2
GLUCOSE BLD-MCNC: 104 MG/DL (ref 70–108)
POTASSIUM SERPL-SCNC: 4.2 MEQ/L (ref 3.5–5.2)
SODIUM BLD-SCNC: 139 MEQ/L (ref 135–145)

## 2020-02-17 PROCEDURE — 3017F COLORECTAL CA SCREEN DOC REV: CPT | Performed by: NURSE PRACTITIONER

## 2020-02-17 PROCEDURE — 4040F PNEUMOC VAC/ADMIN/RCVD: CPT | Performed by: NURSE PRACTITIONER

## 2020-02-17 PROCEDURE — G8417 CALC BMI ABV UP PARAM F/U: HCPCS | Performed by: NURSE PRACTITIONER

## 2020-02-17 PROCEDURE — 1036F TOBACCO NON-USER: CPT | Performed by: NURSE PRACTITIONER

## 2020-02-17 PROCEDURE — 1090F PRES/ABSN URINE INCON ASSESS: CPT | Performed by: NURSE PRACTITIONER

## 2020-02-17 PROCEDURE — 1123F ACP DISCUSS/DSCN MKR DOCD: CPT | Performed by: NURSE PRACTITIONER

## 2020-02-17 PROCEDURE — 99213 OFFICE O/P EST LOW 20 MIN: CPT | Performed by: NURSE PRACTITIONER

## 2020-02-17 PROCEDURE — G8427 DOCREV CUR MEDS BY ELIG CLIN: HCPCS | Performed by: NURSE PRACTITIONER

## 2020-02-17 PROCEDURE — G8482 FLU IMMUNIZE ORDER/ADMIN: HCPCS | Performed by: NURSE PRACTITIONER

## 2020-02-17 PROCEDURE — 36415 COLL VENOUS BLD VENIPUNCTURE: CPT | Performed by: NURSE PRACTITIONER

## 2020-02-17 PROCEDURE — G8400 PT W/DXA NO RESULTS DOC: HCPCS | Performed by: NURSE PRACTITIONER

## 2020-02-17 RX ORDER — DOCUSATE SODIUM 100 MG/1
100 CAPSULE, LIQUID FILLED ORAL PRN
COMMUNITY

## 2020-02-17 RX ORDER — BUMETANIDE 1 MG/1
2 TABLET ORAL DAILY
COMMUNITY
End: 2020-02-17

## 2020-02-17 RX ORDER — BUMETANIDE 1 MG/1
1 TABLET ORAL DAILY
Qty: 30 TABLET | Refills: 3
Start: 2020-02-17 | End: 2020-06-25

## 2020-02-17 ASSESSMENT — ENCOUNTER SYMPTOMS
WHEEZING: 0
ABDOMINAL DISTENTION: 0
APNEA: 0
ABDOMINAL PAIN: 0
SHORTNESS OF BREATH: 0
COLOR CHANGE: 0
NAUSEA: 0
COUGH: 0
CHEST TIGHTNESS: 0

## 2020-02-17 NOTE — PROGRESS NOTES
[address] [facility] [place]       Visit Date: 2/17/2020  Cardiologist:  Dr. Hannah Becerra   Primary Care Physician: Dr. Dylan Villar MD    Eric Ramírez is a 81 y.o. female who presents today for:  Chief Complaint   Patient presents with    Congestive Heart Failure       HPI: Obtained from patient and chart    Eric Ramírez is a 81 y.o. female who presents to the office for a follow up visit in the heart failure clinic. Hx depression, ECHO 7/31/19 revealed EF 55-60%, RSVP 27 mmHg although RV is severely dilated.      HPI 1/29/2020  She was started on Letairis and Adempas per Dr Lachelle Mcdaniel [facility]). She developed weight gain with increased SOB and swelling with the Letairis so this was stopped last week. Dr Lachelle Mcdaniel recommended admission for IV diuresis. She came to the ER but the ER physician sent her home. We doubled her Bumex for 3 days, her symptoms are improving but she is still 5 pounds up with LE edema. She sleeps in a chair for comfort. She is incontinent at times with urine. She is making urine. She can perform ADLs without SOB or fatigue. She is not compliant with diet. She eats TV dinners and ham sandwiches. She also eats chips or puffed popcorn as a snack.     8/27/19 RHC:  RA 15, RV 81/21, PA 85/31 with a mean of 52, wedge pressure 18 mmHg, PA saturation 66%, RA saturation 66%,  cardiac output 3.4, cardiac index 1.8. LHC no significant CAD   Aggressive diuresis was tried for the above in the hospital but ultimately had ALVIN.      She was admitted again in Sept 2019 for hypotension, dehydration/ALVIN after she was not feeling well and decreased appetite, not drinking much.    This resolved. Her Bumex was decreased. She was taken off her Lisinopril/HCTZ. Her Bps went up so she was put on Norvasc. Her legs started swelling and she was taken off the Norvasc by her Nephrologist and put back on low dose Bumex.      She was started on Adempas and Letaris in Dec 2019  by Gissel.  She developed worsening CHF after starting so the Letaris was stopped. She was given iV diuresis. 2/17/20  Weight is down to 201 pounds on her home scale. She is down 13 pounds form her last clinic visit. LE edema much improved. She has been trying to not eat as many potato chips. Sleeps in a recliner which she has for years. She is walking around with there walker, admits to not as much as she could. She does complain of constipation at times. She can perform ADLs without SOB or fatigue.      Accompanied by: sister Enrique Baldwin  Last hospital admission related to Heart Failure:  none  Chest Pain: no  Worsening SOB: improved  Worsening Orthopnea/PND: no  Edema: improved  Any extra diuretic use: no  Weight gain: no  Fatigue: improving   Abdominal bloating: some  Appetite: good  BARBER: no  Cough: no  Compliant checking home weight: yes  Compliant checking blood pressure: yes      Past Medical History:   Diagnosis Date    Depression     Hypertension     Osteoarthritis     Pulmonary artery hypertension (HCC)     Thrombocytopenia (HCC)      Past Surgical History:   Procedure Laterality Date    APPENDECTOMY      FACIAL NERVE SURGERY      1989     Family History   Problem Relation Age of Onset    Diabetes Father    [facility] Arthritis Mother     COPD Mother     Diabetes Sister     Heart Disease Maternal Uncle     Breast Cancer Niece 36    Sleep Apnea Brother     Asthma Neg Hx     Birth Defects Neg Hx     Cancer Neg Hx     Depression Neg Hx     Early Death Neg Hx     Hearing Loss Neg Hx     High Blood Pressure Neg Hx     High Cholesterol Neg Hx     Kidney Disease Neg Hx     Learning Disabilities Neg Hx     Mental Illness Neg Hx     Mental Retardation Neg Hx     Miscarriages / Stillbirths Neg Hx     Stroke Neg Hx     Substance Abuse Neg Hx     Vision Loss Neg Hx     Other Neg Hx      Social History     Tobacco Use    Smoking status: Never Smoker    Smokeless tobacco: Never Used   Substance Use Topics    Alcohol use: No     Current Outpatient Medications   Medication Sig Dispense Refill    docusate sodium (COLACE) 100 MG capsule Take 100 mg by mouth every other day      bumetanide (BUMEX) 1 MG tablet Take 2 mg by mouth daily      ADEMPAS 1 MG TABS Take 2 mg by mouth three times daily       enalapril (VASOTEC) 2.5 MG tablet Take 1 tablet by mouth daily 90 tablet 3    Melatonin 3 MG CAPS Take 3 mg by mouth nightly (Patient taking differently: Take 3 mg by mouth nightly as needed ) 30 capsule 0    metoprolol tartrate (LOPRESSOR) 25 MG tablet Take 0.5 tablets by mouth 2 times daily 90 tablet 3    FLUoxetine (PROZAC) 40 MG capsule Take 1 capsule by mouth daily 90 capsule 3    aspirin 81 MG tablet Take 81 mg by mouth daily       Multiple Vitamins-Minerals (MULTI-VITAMIN GUMMIES PO) Take 2 tablets by mouth daily        No current facility-administered medications for this visit. No Known Allergies    SUBJECTIVE:   Review of Systems   Constitutional: Negative for activity change, appetite change, fatigue and fever. HENT: Negative for congestion. Respiratory: Negative for apnea, cough, chest tightness, shortness of breath and wheezing. Cardiovascular: Negative for chest pain, palpitations and leg swelling. Gastrointestinal: Negative for abdominal distention, abdominal pain and nausea. Genitourinary: Negative for difficulty urinating and dysuria. Musculoskeletal: Positive for gait problem (walker). Negative for arthralgias. Skin: Negative for color change. Neurological: Negative for dizziness, numbness and headaches. Psychiatric/Behavioral: Negative for agitation, confusion and sleep disturbance. The patient is not nervous/anxious. OBJECTIVE:   Today's Vitals:  BP (!) 98/50   Pulse 68   Wt 203 lb (92.1 kg)   SpO2 93%   BMI 43.93 kg/m²     Physical Exam  Vitals signs reviewed. Constitutional:       Appearance: She is well-developed. HENT:      Head: Normocephalic and atraumatic.    Eyes:      Pupils: Pupils Vessels   -Ascending aorta = 3.3 cm. -IVC not well seen.     RHC [facility] 12/9/19   Baseline/Resting Hemodynamics    mRAP 17 mmHg   RV 92/8 mmHg   PA 90/29/51 mmHg   PCWP 15 mmHg   Lesley CO 4.3 L/min   Lesley CI 2.3 L/min/m2   PVR 8.4 Wood units     Saturations    RA 68 %   PA 61 %     Sana Vasodilator Challenge 40 ppm   PA 88/29/49 mmHg   Lesley CO 4.9 L/min   Lesley CI 2.6 L/min/m2   PVR 6.9 Wood units     Impression  · Elevated mRAP   · Severely elevated pulmonary artery pressure  · Normal PCWP  · No evidence of left to right shunt  · Low normal cardiac output/index  · Negative inhaled nitric oxide vasodilator challenge    Plan  · PAH WHO Group I -- Idiopathic (ongoing eval)  · Exertional Dyspnea NYHA Functional Classification III  · Recommend further diuresis and follow up labs in one week  · Low sodium diet and daily standing weights  · Initiate riociguat with standard up titration  · Initiate ambrisentan 5 mg daily and up titrate to 10 mg daily in two   weeks  · Clinic follow up in 2-3 months  · Complete evaluation with full PFT and labs including RF, RYLAN, HIV ab     Results reviewed:  BNP:   Lab Results   Component Value Date    BNP 23 10/04/2017    PROBNP 1443.0 (H) 01/24/2020     CBC:   Lab Results   Component Value Date    WBC 4.6 01/24/2020    RBC 3.62 01/24/2020    RBC 4.15 01/09/2018    HGB 10.6 01/24/2020    HCT 33.3 01/24/2020     01/24/2020     CMP:    Lab Results   Component Value Date     02/03/2020    K 4.3 02/03/2020    K 4.1 01/24/2020    CL 96 02/03/2020    CO2 31 02/03/2020    BUN 55 02/03/2020    CREATININE 1.8 02/03/2020    LABGLOM 28 02/03/2020    GLUCOSE 111 02/03/2020    GLUCOSE 103 01/09/2018    CALCIUM 9.5 02/03/2020     Hepatic Function Panel:    Lab Results   Component Value Date    ALKPHOS 89 01/24/2020    ALT 9 01/24/2020    AST 16 01/24/2020    PROT 7.0 01/24/2020    BILITOT 0.5 01/24/2020    LABALBU 3.6 01/24/2020     Magnesium:    Lab Results   Component Value Date    MG 2.1 Electronicallysigned by LANE Menezes CNP on 2/17/2020 at 8:47 AM

## 2020-02-17 NOTE — TELEPHONE ENCOUNTER
Notified Jayde Cortez of detailed instructions and reviewed Zones with her. LM for [place] Manners to give clinic at call regarding changes.

## 2020-02-17 NOTE — TELEPHONE ENCOUNTER
BMP reviewed  Kidney function slightly worse  Cr 1.9 GFR 26  Please have her decrease the Bumex to 1 mg daily (from 2 mg)  BMP in 2 weeks    Please notify both patient and her sister Kelly Parish of above  Thanks

## 2020-03-02 ENCOUNTER — HOSPITAL ENCOUNTER (OUTPATIENT)
Age: 70
Discharge: HOME | End: 2020-03-02
Payer: MEDICARE

## 2020-03-02 ENCOUNTER — TELEPHONE (OUTPATIENT)
Dept: CARDIOLOGY CLINIC | Age: 70
End: 2020-03-02

## 2020-03-02 DIAGNOSIS — I50.32 CHF (CONGESTIVE HEART FAILURE), NYHA CLASS II, CHRONIC, DIASTOLIC (HCC): ICD-10-CM

## 2020-03-02 LAB
ANION GAP SERPL CALCULATED.3IONS-SCNC: 11 MEQ/L (ref 8–16)
BUN BLDV-MCNC: 43 MG/DL (ref 7–22)
CALCIUM SERPL-MCNC: 9.4 MG/DL (ref 8.5–10.5)
CHLORIDE BLD-SCNC: 98 MEQ/L (ref 98–111)
CO2: 25 MEQ/L (ref 23–33)
CREAT SERPL-MCNC: 1.3 MG/DL (ref 0.4–1.2)
GFR SERPL CREATININE-BSD FRML MDRD: 41 ML/MIN/1.73M2
GLUCOSE BLD-MCNC: 94 MG/DL (ref 70–108)
POTASSIUM SERPL-SCNC: 4.3 MEQ/L (ref 3.5–5.2)
SODIUM BLD-SCNC: 134 MEQ/L (ref 135–145)

## 2020-03-02 PROCEDURE — 36415 COLL VENOUS BLD VENIPUNCTURE: CPT

## 2020-03-02 PROCEDURE — 80048 BASIC METABOLIC PNL TOTAL CA: CPT

## 2020-03-02 NOTE — TELEPHONE ENCOUNTER
Please have her take an extra Bumex 1 mg tomorrow and call Wed with update     Also BMP reviewed  Kidney function is improving Cr 1.3 GFR 41 (from 1.9/26)

## 2020-03-02 NOTE — TELEPHONE ENCOUNTER
Sister Marly Bustamante called   Patient wt has gone up from 202 to 205.6  States appetite has increased   Denies SOB   Has some swelling on top of her feet but not pitting

## 2020-03-06 ENCOUNTER — HOSPITAL ENCOUNTER (EMERGENCY)
Age: 70
Discharge: HOME | End: 2020-03-06
Payer: MEDICARE

## 2020-03-06 ENCOUNTER — APPOINTMENT (OUTPATIENT)
Dept: GENERAL RADIOLOGY | Age: 70
End: 2020-03-06
Payer: MEDICARE

## 2020-03-06 ENCOUNTER — APPOINTMENT (OUTPATIENT)
Dept: INTERVENTIONAL RADIOLOGY/VASCULAR | Age: 70
End: 2020-03-06
Payer: MEDICARE

## 2020-03-06 VITALS
SYSTOLIC BLOOD PRESSURE: 110 MMHG | RESPIRATION RATE: 16 BRPM | WEIGHT: 203.25 LBS | HEART RATE: 71 BPM | BODY MASS INDEX: 43.85 KG/M2 | HEIGHT: 57 IN | OXYGEN SATURATION: 97 % | TEMPERATURE: 98.5 F | DIASTOLIC BLOOD PRESSURE: 71 MMHG

## 2020-03-06 LAB
ALBUMIN SERPL-MCNC: 3.4 G/DL (ref 3.5–5.1)
ALP BLD-CCNC: 110 U/L (ref 38–126)
ALT SERPL-CCNC: 11 U/L (ref 11–66)
ANION GAP SERPL CALCULATED.3IONS-SCNC: 16 MEQ/L (ref 8–16)
AST SERPL-CCNC: 17 U/L (ref 5–40)
BACTERIA: ABNORMAL /HPF
BASOPHILS # BLD: 0.3 %
BASOPHILS ABSOLUTE: 0 THOU/MM3 (ref 0–0.1)
BILIRUB SERPL-MCNC: 0.7 MG/DL (ref 0.3–1.2)
BILIRUBIN DIRECT: 0.3 MG/DL (ref 0–0.3)
BILIRUBIN URINE: NEGATIVE
BLOOD, URINE: ABNORMAL
BUN BLDV-MCNC: 37 MG/DL (ref 7–22)
CALCIUM SERPL-MCNC: 9.7 MG/DL (ref 8.5–10.5)
CASTS 2: ABNORMAL /LPF
CASTS UA: ABNORMAL /LPF
CHARACTER, URINE: ABNORMAL
CHLORIDE BLD-SCNC: 100 MEQ/L (ref 98–111)
CO2: 21 MEQ/L (ref 23–33)
COLOR: YELLOW
CREAT SERPL-MCNC: 1.4 MG/DL (ref 0.4–1.2)
CRYSTALS, UA: ABNORMAL
EKG ATRIAL RATE: 326 BPM
EKG Q-T INTERVAL: 428 MS
EKG QRS DURATION: 78 MS
EKG QTC CALCULATION (BAZETT): 594 MS
EKG R AXIS: 81 DEGREES
EKG T AXIS: -13 DEGREES
EKG VENTRICULAR RATE: 116 BPM
EOSINOPHIL # BLD: 0.4 %
EOSINOPHILS ABSOLUTE: 0 THOU/MM3 (ref 0–0.4)
EPITHELIAL CELLS, UA: ABNORMAL /HPF
ERYTHROCYTE [DISTWIDTH] IN BLOOD BY AUTOMATED COUNT: 14 % (ref 11.5–14.5)
ERYTHROCYTE [DISTWIDTH] IN BLOOD BY AUTOMATED COUNT: 51 FL (ref 35–45)
GFR SERPL CREATININE-BSD FRML MDRD: 37 ML/MIN/1.73M2
GLUCOSE BLD-MCNC: 90 MG/DL (ref 70–108)
GLUCOSE URINE: NEGATIVE MG/DL
HCT VFR BLD CALC: 37.3 % (ref 37–47)
HEMOGLOBIN: 11 GM/DL (ref 12–16)
IMMATURE GRANS (ABS): 0.02 THOU/MM3 (ref 0–0.07)
IMMATURE GRANULOCYTES: 0.3 %
KETONES, URINE: NEGATIVE
LEUKOCYTE ESTERASE, URINE: ABNORMAL
LYMPHOCYTES # BLD: 10.5 %
LYMPHOCYTES ABSOLUTE: 0.7 THOU/MM3 (ref 1–4.8)
MCH RBC QN AUTO: 29.2 PG (ref 26–33)
MCHC RBC AUTO-ENTMCNC: 29.5 GM/DL (ref 32.2–35.5)
MCV RBC AUTO: 98.9 FL (ref 81–99)
MISCELLANEOUS 2: ABNORMAL
MONOCYTES # BLD: 8.8 %
MONOCYTES ABSOLUTE: 0.6 THOU/MM3 (ref 0.4–1.3)
NITRITE, URINE: NEGATIVE
NUCLEATED RED BLOOD CELLS: 0 /100 WBC
OSMOLALITY CALCULATION: 282 MOSMOL/KG (ref 275–300)
PH UA: 6 (ref 5–9)
PLATELET # BLD: 167 THOU/MM3 (ref 130–400)
PMV BLD AUTO: 9.3 FL (ref 9.4–12.4)
POTASSIUM SERPL-SCNC: 3.5 MEQ/L (ref 3.5–5.2)
PRO-BNP: 1425 PG/ML (ref 0–900)
PROTEIN UA: 100
RBC # BLD: 3.77 MILL/MM3 (ref 4.2–5.4)
RBC URINE: ABNORMAL /HPF
RENAL EPITHELIAL, UA: ABNORMAL
SEG NEUTROPHILS: 79.7 %
SEGMENTED NEUTROPHILS ABSOLUTE COUNT: 5.7 THOU/MM3 (ref 1.8–7.7)
SODIUM BLD-SCNC: 137 MEQ/L (ref 135–145)
SPECIFIC GRAVITY, URINE: 1.01 (ref 1–1.03)
TOTAL PROTEIN: 8.2 G/DL (ref 6.1–8)
TROPONIN T: 0.04 NG/ML
URIC ACID: 12.4 MG/DL (ref 2.4–5.7)
UROBILINOGEN, URINE: 1 EU/DL (ref 0–1)
WBC # BLD: 7.1 THOU/MM3 (ref 4.8–10.8)
WBC UA: > 200 /HPF
YEAST: ABNORMAL

## 2020-03-06 PROCEDURE — 36415 COLL VENOUS BLD VENIPUNCTURE: CPT

## 2020-03-06 PROCEDURE — 84550 ASSAY OF BLOOD/URIC ACID: CPT

## 2020-03-06 PROCEDURE — 82248 BILIRUBIN DIRECT: CPT

## 2020-03-06 PROCEDURE — 93970 EXTREMITY STUDY: CPT

## 2020-03-06 PROCEDURE — 84484 ASSAY OF TROPONIN QUANT: CPT

## 2020-03-06 PROCEDURE — 87086 URINE CULTURE/COLONY COUNT: CPT

## 2020-03-06 PROCEDURE — 81001 URINALYSIS AUTO W/SCOPE: CPT

## 2020-03-06 PROCEDURE — 71046 X-RAY EXAM CHEST 2 VIEWS: CPT

## 2020-03-06 PROCEDURE — 85025 COMPLETE CBC W/AUTO DIFF WBC: CPT

## 2020-03-06 PROCEDURE — 93005 ELECTROCARDIOGRAM TRACING: CPT | Performed by: PHYSICIAN ASSISTANT

## 2020-03-06 PROCEDURE — 93010 ELECTROCARDIOGRAM REPORT: CPT | Performed by: NUCLEAR MEDICINE

## 2020-03-06 PROCEDURE — 80053 COMPREHEN METABOLIC PANEL: CPT

## 2020-03-06 PROCEDURE — 99282 EMERGENCY DEPT VISIT SF MDM: CPT

## 2020-03-06 PROCEDURE — 83880 ASSAY OF NATRIURETIC PEPTIDE: CPT

## 2020-03-06 RX ORDER — PREDNISONE 10 MG/1
TABLET ORAL
Qty: 20 TABLET | Refills: 0 | Status: SHIPPED | OUTPATIENT
Start: 2020-03-06 | End: 2020-03-16

## 2020-03-06 ASSESSMENT — PAIN DESCRIPTION - FREQUENCY: FREQUENCY: INTERMITTENT

## 2020-03-06 ASSESSMENT — PAIN DESCRIPTION - ORIENTATION: ORIENTATION: RIGHT

## 2020-03-06 ASSESSMENT — PAIN DESCRIPTION - PAIN TYPE: TYPE: ACUTE PAIN

## 2020-03-06 ASSESSMENT — PAIN SCALES - GENERAL: PAINLEVEL_OUTOF10: 5

## 2020-03-06 ASSESSMENT — PAIN DESCRIPTION - LOCATION: LOCATION: FOOT

## 2020-03-06 ASSESSMENT — PAIN DESCRIPTION - DESCRIPTORS: DESCRIPTORS: THROBBING

## 2020-03-06 NOTE — ED NOTES
Patient presents to the ED with complaints of right foot pain. States she doesn't remember injuring it. States she does have CHF and she has gained 3lbs in one day. States the CHF clinic told her to take an extra dose of bumex. Patient states the swelling in her feet has been increasing.      Venus Oscar  03/06/20 [id number]

## 2020-03-06 NOTE — CARE COORDINATION
ED Care Transition    3/6/2020    Patient Name: Hernan Phillips   : 1950  MRN: [id number]    MEENA Score: 3  PCP: Kerri Watkins MD   Specialist: yes - Dr. Shabana Murillo, Ghassan Vallejo, Dr. Essence Swan ACC/CTC: no                       Utilization Review:  ED visits: 2  3/6/20 - Foot pain, leg swelling   20 - Leg swelling  Admissions: 1  19-19 - Hypotension d/t hypovolemia     Problem List:  Patient Active Problem List   Diagnosis    HTN (hypertension)    Chronic depression    [facility]bid obesity ([facility]    CHF (congestive heart failure) (HCC)    Thrombocytopenia (HCC)    Moderate episode of recurrent major depressive disorder ([facility]    Renal failure syndrome    Hyperkalemia    Isolated non-nephrotic proteinuria    ALVIN (acute kidney injury) ([facility]    Chronic right-sided heart failure ([facility]    Pulmonary HTN ([facility]    Hypotension due to hypovolemia    Acute renal failure superimposed on stage 4 chronic kidney disease ([facility] [address] [facility]     Summary:  Met with Cinthya Baxter, introduced self/role. Presented to ED for evaluation of right foot pain, bilateral foot swelling, and 3 lb weight gain. Patient called CHF Clinic on 3/2 for leg swelling and weight gain, advised to take extra Bumex 1 mg and call Wed with update. Reported on 3/4 weight and swelling decreased. Patient resides at home alone, has walker and lift chair. Family assists with transportation and needs. No other services. Patient continues to decline further needs/assistance. Plan is for discharge. Stressed importance of early symptom recognition and notifying CHF clinic for 3lbs in a day or 5 lbs in a week, increased sob, SOB while laying down, cough, chest pain, swelling, tenderness or bloating, fatigue, decreased appetite or confusion, verbalized understanding. Patient educated on steroids. Advised to call PCP with any new or worsening pain, verbalized understanding. Denies further needs/assistance/concerns/questions.         Follow up appointments:

## 2020-03-07 LAB
ORGANISM: ABNORMAL
URINE CULTURE REFLEX: ABNORMAL

## 2020-03-09 ENCOUNTER — TELEPHONE (OUTPATIENT)
Dept: FAMILY MEDICINE CLINIC | Age: 70
End: 2020-03-09

## 2020-03-09 ENCOUNTER — CARE COORDINATION (OUTPATIENT)
Dept: CASE MANAGEMENT | Age: 70
End: 2020-03-09

## 2020-03-09 NOTE — TELEPHONE ENCOUNTER
[facility] ED Follow up Call    Reason for ED visit:  Foot pain leg swelling     Mychart message sent          FU appts/Provider:    Future Appointments   Date Time Provider Shira Vandana   3/25/2020  3:15 PM Bill Abel MD [facility]   4/28/2020  2:40 PM Oli Cornejo MD [facility]   5/19/2020  8:45 AM LANE Alarcon - CNP [facility]

## 2020-03-17 ENCOUNTER — TELEPHONE (OUTPATIENT)
Dept: FAMILY MEDICINE CLINIC | Age: 70
End: 2020-03-17

## 2020-03-17 RX ORDER — PREDNISONE 20 MG/1
TABLET ORAL
Qty: 15 TABLET | Refills: 0 | Status: SHIPPED | OUTPATIENT
Start: 2020-03-17 | End: 2020-06-10

## 2020-03-17 NOTE — TELEPHONE ENCOUNTER
Patient's sister, Mamta Grant, called. States that she just got out of the hospital for gout in the right foot. Now she is having pain in the left foot. She says that is how the other foot started. She really doesn;t want to bring her out right now. Are you able to send something in for her? She uses [employer] in [place].   Call Kayce Balderrama back at [phone] with any questions

## 2020-03-23 ASSESSMENT — ENCOUNTER SYMPTOMS
NAUSEA: 0
ABDOMINAL PAIN: 0
BACK PAIN: 0
SHORTNESS OF BREATH: 1
RECTAL PAIN: 0
COLOR CHANGE: 0
EYE PAIN: 0
DIARRHEA: 0
SINUS PAIN: 0
CHEST TIGHTNESS: 0
VOMITING: 0
WHEEZING: 0

## 2020-03-23 NOTE — ED PROVIDER NOTES
which have NOT CHANGED    Details   docusate sodium (COLACE) 100 MG capsule Take 100 mg by mouth every other dayHistorical Med      bumetanide (BUMEX) 1 MG tablet Take 1 tablet by mouth daily, Disp-30 tablet, R-3NO PRINT      ADEMPAS 1 MG TABS Take 2 mg by mouth three times daily , DAWHistorical Med      enalapril (VASOTEC) 2.5 MG tablet Take 1 tablet by mouth daily, Disp-90 tablet, R-3Normal      Melatonin 3 MG CAPS Take 3 mg by mouth nightly, Disp-30 capsule, R-0OTC      metoprolol tartrate (LOPRESSOR) 25 MG tablet Take 0.5 tablets by mouth 2 times daily, Disp-90 tablet, R-3Normal      FLUoxetine (PROZAC) 40 MG capsule Take 1 capsule by mouth daily, Disp-90 capsule, R-3Normal      aspirin 81 MG tablet Take 81 mg by mouth daily Historical Med      Multiple Vitamins-Minerals (MULTI-VITAMIN GUMMIES PO) Take 2 tablets by mouth daily Historical Med             ALLERGIES     has No Known Allergies. FAMILY HISTORY     She indicated that her mother is . She indicated that her father is . She indicated that the status of her sister is unknown. She indicated that the status of her brother is unknown. She indicated that the status of her maternal uncle is unknown. She indicated that the status of her neg hx is unknown. She indicated that her niece is alive. family history includes Arthritis in her mother; Breast Cancer (age of onset: 36) in her niece; COPD in her mother; Diabetes in her father and sister; Heart Disease in her maternal uncle; Sleep Apnea in her brother. SOCIAL HISTORY      reports that she has never smoked. She has never used smokeless tobacco. She reports that she does not drink alcohol or use drugs. PHYSICAL EXAM     INITIAL VITALS:  height is 4' 9\" (1.448 m) and weight is 203 lb 4 oz (92.2 kg). Her oral temperature is 98.5 °F (36.9 °C). Her blood pressure is 110/71 and her pulse is 71. Her respiration is 16 and oxygen saturation is 97%.     Physical Exam  Vitals signs and nursing Neurological:      General: No focal deficit present. Mental Status: She is alert and oriented to person, place, and time. Mental status is at baseline. GCS: GCS eye subscore is 4. GCS verbal subscore is 5. GCS motor subscore is 6. Cranial Nerves: Cranial nerves are intact. Psychiatric:         Attention and Perception: Attention normal.         Mood and Affect: Mood normal.         Speech: Speech normal.         Behavior: Behavior normal. Behavior is cooperative. Thought Content: Thought content normal.         Cognition and Memory: Cognition and memory normal.         Judgment: Judgment normal.          DIFFERENTIAL DIAGNOSIS:   CHF, DVT, gout    DIAGNOSTIC RESULTS     EKG: All EKG's are interpreted by the Emergency Department Physician who either signs or Co-signs this chart in the absence of a cardiologist.      RADIOLOGY: non-plainfilm images(s) such as CT, Ultrasound and MRI are read by the radiologist.    VL DUP LOWER EXTREMITY VENOUS BILATERAL   Final Result   No evidence of deep vein thrombosis throughout the bilateral lower extremities. **This report has been created using voice recognition software. It may contain minor errors which are inherent in voice recognition technology. **      Final report electronically signed by Dr Laura Tomlinson on 3/6/2020 2:55 PM      XR CHEST STANDARD (2 VW)   Final Result    IMPRESSION:   Cardiomegaly. Low lung volumes with prominent interstitium suggesting vascular crowding and interstitial edema. **This report has been created using voice recognition software. It may contain minor errors which are inherent in voice recognition technology. **      Final report electronically signed by Dr. Mik Masterson on 3/6/2020 12:30 PM          LABS:     Labs Reviewed   CULTURE, REFLEXED, URINE - Abnormal; Notable for the following components:       Result Value    Urine Culture Reflex   (*)     Value: Mixed growth.   The mixture of organisms present represents both organisms that may cause urinary tract infections and organisms that are not a common cause of urinary tract infections and are possibly skin magdiel or distal urethral magdiel.      Organism Mixed Growth     (*)     All other components within normal limits    Narrative:     Source: urine, clean catch       Site: clean void          Current Antibiotics: none   CBC WITH AUTO DIFFERENTIAL - Abnormal; Notable for the following components:    RBC 3.77 (*)     Hemoglobin 11.0 (*)     MCHC 29.5 (*)     RDW-SD 51.0 (*)     MPV 9.3 (*)     Lymphocytes Absolute 0.7 (*)     All other components within normal limits   BRAIN NATRIURETIC PEPTIDE - Abnormal; Notable for the following components:    Pro-BNP 1425.0 (*)     All other components within normal limits   BASIC METABOLIC PANEL - Abnormal; Notable for the following components:    CO2 21 (*)     BUN 37 (*)     CREATININE 1.4 (*)     All other components within normal limits   HEPATIC FUNCTION PANEL - Abnormal; Notable for the following components:    Alb 3.4 (*)     Total Protein 8.2 (*)     All other components within normal limits   TROPONIN - Abnormal; Notable for the following components:    Troponin T 0.037 (*)     All other components within normal limits   GLOMERULAR FILTRATION RATE, ESTIMATED - Abnormal; Notable for the following components:    Est, Glom Filt Rate 37 (*)     All other components within normal limits   URIC ACID - Abnormal; Notable for the following components:    Uric Acid 12.4 (*)     All other components within normal limits   URINE WITH REFLEXED MICRO - Abnormal; Notable for the following components:    Blood, Urine LARGE (*)     Protein,  (*)     Leukocyte Esterase, Urine LARGE (*)     Character, Urine TURBID (*)     All other components within normal limits   ANION GAP   OSMOLALITY       EMERGENCY DEPARTMENT COURSE:   Vitals:    Vitals:    03/06/20 1124 03/06/20 1237 03/06/20 1510   BP: (!) 112/50 (!) 111/52 110/71   Pulse: 74 73 71   Resp: 16 17 16   Temp: 98.5 °F (36.9 °C)     TempSrc: Oral     SpO2: 100% 97% 97%   Weight: 203 lb 4 oz (92.2 kg)     Height: 4' 9\" (1.448 m)             MDM:  Patient seen and evaluated for right foot pain and swelling. Patient has bilateral swelling on physical exam.  Ultrasound negative for DVT. This does not appear to be in acute exacerbation of patient's CHF. Pain is isolated to right side. Uric acid elevated, possible gout. Due to patient's renal function, she will be started on steroids instead of allopurinol. Instructed to follow-up with CHF clinic as scheduled. Criteria for return to the emergency department were discussed, all questions were answered, patient was amenable to this plan and discharged in stable condition    CRITICAL CARE:   None    CONSULTS:  None    PROCEDURES:  None    FINAL IMPRESSION      1. Right foot pain    2. Acute idiopathic gout of right foot          DISPOSITION/PLAN   Discharge    PATIENT REFERRED TO:  Hosea Kanner, [address]  [phone]    Schedule an appointment as soon as possible for a visit in 1 week  If symptoms worsen    Sydnie Ferreira MD  [address]  [phone]      As needed      DISCHARGE MEDICATIONS:  Discharge Medication List as of 3/6/2020  3:17 PM      START taking these medications    Details   predniSONE (DELTASONE) 10 MG tablet Take 4 tablets by mouth once daily for 5 days, Disp-20 tablet, R-0Print             (Please note that portions of this note were completed with a voice recognition program.  Efforts were made to edit the dictations but occasionally words are mis-transcribed.)    The patient was given an opportunity to see the Emergency Attending. The patient voiced understanding that I was a Mid-LevelProvider and was in agreement with being seen independently by myself.           Butch Clements, LANE - CNP  03/23/20 8815

## 2020-06-10 ENCOUNTER — OFFICE VISIT (OUTPATIENT)
Dept: CARDIOLOGY CLINIC | Age: 70
End: 2020-06-10
Payer: MEDICARE

## 2020-06-10 VITALS
DIASTOLIC BLOOD PRESSURE: 60 MMHG | BODY MASS INDEX: 45.09 KG/M2 | HEIGHT: 57 IN | OXYGEN SATURATION: 95 % | HEART RATE: 67 BPM | WEIGHT: 209 LBS | SYSTOLIC BLOOD PRESSURE: 102 MMHG

## 2020-06-10 PROCEDURE — G8427 DOCREV CUR MEDS BY ELIG CLIN: HCPCS | Performed by: NURSE PRACTITIONER

## 2020-06-10 PROCEDURE — G8400 PT W/DXA NO RESULTS DOC: HCPCS | Performed by: NURSE PRACTITIONER

## 2020-06-10 PROCEDURE — 1036F TOBACCO NON-USER: CPT | Performed by: NURSE PRACTITIONER

## 2020-06-10 PROCEDURE — 1090F PRES/ABSN URINE INCON ASSESS: CPT | Performed by: NURSE PRACTITIONER

## 2020-06-10 PROCEDURE — 99213 OFFICE O/P EST LOW 20 MIN: CPT | Performed by: NURSE PRACTITIONER

## 2020-06-10 PROCEDURE — 4040F PNEUMOC VAC/ADMIN/RCVD: CPT | Performed by: NURSE PRACTITIONER

## 2020-06-10 PROCEDURE — 1123F ACP DISCUSS/DSCN MKR DOCD: CPT | Performed by: NURSE PRACTITIONER

## 2020-06-10 PROCEDURE — 3017F COLORECTAL CA SCREEN DOC REV: CPT | Performed by: NURSE PRACTITIONER

## 2020-06-10 PROCEDURE — G8417 CALC BMI ABV UP PARAM F/U: HCPCS | Performed by: NURSE PRACTITIONER

## 2020-06-10 RX ORDER — RIOCIGUAT 2.5 MG/1
2.5 TABLET, FILM COATED ORAL 3 TIMES DAILY
Status: ON HOLD | COMMUNITY
End: 2022-04-12 | Stop reason: ALTCHOICE

## 2020-06-10 ASSESSMENT — ENCOUNTER SYMPTOMS
COUGH: 0
CHEST TIGHTNESS: 0
NAUSEA: 0
ABDOMINAL PAIN: 0
ABDOMINAL DISTENTION: 0
COLOR CHANGE: 0
SHORTNESS OF BREATH: 1
APNEA: 0
WHEEZING: 0

## 2020-06-10 NOTE — PROGRESS NOTES
Neg Hx     Miscarriages / Stillbirths Neg Hx     Stroke Neg Hx     Substance Abuse Neg Hx     Vision Loss Neg Hx     Other Neg Hx      Social History     Tobacco Use    Smoking status: Never Smoker    Smokeless tobacco: Never Used   Substance Use Topics    Alcohol use: No     Current Outpatient Medications   Medication Sig Dispense Refill    Riociguat (ADEMPAS) 2.5 MG TABS Take 2.5 mg by mouth 3 times daily      bumetanide (BUMEX) 1 MG tablet Take 1 tablet by mouth daily 30 tablet 3    enalapril (VASOTEC) 2.5 MG tablet Take 1 tablet by mouth daily 90 tablet 3    metoprolol tartrate (LOPRESSOR) 25 MG tablet Take 0.5 tablets by mouth 2 times daily 90 tablet 3    FLUoxetine (PROZAC) 40 MG capsule Take 1 capsule by mouth daily 90 capsule 3    aspirin 81 MG tablet Take 81 mg by mouth daily       Multiple Vitamins-Minerals (MULTI-VITAMIN GUMMIES PO) Take 2 tablets by mouth daily       docusate sodium (COLACE) 100 MG capsule Take 100 mg by mouth every other day       No current facility-administered medications for this visit. No Known Allergies    SUBJECTIVE:   Review of Systems   Constitutional: Negative for activity change, appetite change, fatigue and fever. HENT: Negative for congestion. Respiratory: Positive for shortness of breath. Negative for apnea, cough, chest tightness and wheezing. Cardiovascular: Positive for leg swelling. Negative for chest pain and palpitations. Gastrointestinal: Negative for abdominal distention, abdominal pain and nausea. Genitourinary: Negative for difficulty urinating and dysuria. Musculoskeletal: Positive for gait problem (walker ). Negative for arthralgias. Skin: Negative for color change. Neurological: Negative for dizziness, numbness and headaches. Psychiatric/Behavioral: Negative for agitation, confusion and sleep disturbance. The patient is not nervous/anxious.         OBJECTIVE:   Today's Vitals:  /60   Pulse 67   Ht 4' 9\" (1.448 m) appearance with no evidence of a pericardial   effusion. Pleural Effusion   No evidence of pleural effusion. Aorta / Great Vessels   -Ascending aorta = 3.3 cm. -IVC not well seen. Results reviewed:  BNP:   Lab Results   Component Value Date    BNP 23 10/04/2017    PROBNP 1425.0 (H) 03/06/2020     CBC:   Lab Results   Component Value Date    WBC 7.1 03/06/2020    RBC 3.77 03/06/2020    RBC 4.15 01/09/2018    HGB 11.0 03/06/2020    HCT 37.3 03/06/2020     03/06/2020     CMP:    Lab Results   Component Value Date     03/06/2020    K 3.5 03/06/2020    K 4.1 01/24/2020     03/06/2020    CO2 21 03/06/2020    BUN 37 03/06/2020    CREATININE 1.4 03/06/2020    LABGLOM 37 03/06/2020    GLUCOSE 90 03/06/2020    GLUCOSE 103 01/09/2018    CALCIUM 9.7 03/06/2020     Hepatic Function Panel:    Lab Results   Component Value Date    ALKPHOS 110 03/06/2020    ALT 11 03/06/2020    AST 17 03/06/2020    PROT 8.2 03/06/2020    BILITOT 0.7 03/06/2020    BILIDIR 0.3 03/06/2020    LABALBU 3.4 03/06/2020     Magnesium:    Lab Results   Component Value Date    MG 2.1 09/17/2019     PT/INR:    Lab Results   Component Value Date    INR 1.09 01/24/2020     Lipids:    Lab Results   Component Value Date    TRIG 66 06/18/2019    HDL 43 06/18/2019    LDLCALC 75 06/18/2019    LABVLDL 44 01/09/2018       ASSESSMENT AND PLAN:   The patient's condition/symptoms are Stable      Diagnosis Orders   1. CHF (congestive heart failure), NYHA class II, chronic, diastolic (HCC)  Basic Metabolic Panel   2. Pulmonary HTN ([facility] [address]     3. Essential hypertension         Plan:  · GDMT   · ACE/ARB/ARNi: Enalapril 2.5 mg daily   · Beta Blocker: Metoprolol 25 mg bid  · Aldosterone antagonist: no  · Diuretic/Potassium:  Bumex 1 mg daily  · Hydralazine/Nitrate: no  · Other: ASA 81 mg  · Double Bumex 2 mg x 3 days d/t increased swelling and weight gain. They are to call on Monday with an update. · HF Zones reviewed.   · Daily weights  · Fluid

## 2020-06-15 ENCOUNTER — HOSPITAL ENCOUNTER (OUTPATIENT)
Dept: GENERAL RADIOLOGY | Age: 70
Discharge: HOME | End: 2020-06-15
Payer: MEDICARE

## 2020-06-15 ENCOUNTER — HOSPITAL ENCOUNTER (OUTPATIENT)
Age: 70
Discharge: HOME | End: 2020-06-15
Payer: MEDICARE

## 2020-06-15 ENCOUNTER — TELEPHONE (OUTPATIENT)
Dept: CARDIOLOGY CLINIC | Age: 70
End: 2020-06-15

## 2020-06-15 DIAGNOSIS — I50.32 CHF (CONGESTIVE HEART FAILURE), NYHA CLASS II, CHRONIC, DIASTOLIC (HCC): ICD-10-CM

## 2020-06-15 LAB
ANION GAP SERPL CALCULATED.3IONS-SCNC: 12 MEQ/L (ref 8–16)
BUN BLDV-MCNC: 23 MG/DL (ref 7–22)
CALCIUM SERPL-MCNC: 9.3 MG/DL (ref 8.5–10.5)
CHLORIDE BLD-SCNC: 84 MEQ/L (ref 98–111)
CO2: 26 MEQ/L (ref 23–33)
CREAT SERPL-MCNC: 1.1 MG/DL (ref 0.4–1.2)
GFR SERPL CREATININE-BSD FRML MDRD: 49 ML/MIN/1.73M2
GLUCOSE BLD-MCNC: 101 MG/DL (ref 70–108)
POTASSIUM SERPL-SCNC: 4.3 MEQ/L (ref 3.5–5.2)
PRO-BNP: 1347 PG/ML (ref 0–900)
SODIUM BLD-SCNC: 122 MEQ/L (ref 135–145)

## 2020-06-15 PROCEDURE — 80048 BASIC METABOLIC PNL TOTAL CA: CPT

## 2020-06-15 PROCEDURE — 36415 COLL VENOUS BLD VENIPUNCTURE: CPT

## 2020-06-15 PROCEDURE — 83880 ASSAY OF NATRIURETIC PEPTIDE: CPT

## 2020-06-15 PROCEDURE — 71046 X-RAY EXAM CHEST 2 VIEWS: CPT

## 2020-06-16 RX ORDER — SODIUM CHLORIDE 1000 MG
2 TABLET, SOLUBLE MISCELLANEOUS 2 TIMES DAILY
Qty: 120 TABLET | Refills: 3 | Status: SHIPPED | OUTPATIENT
Start: 2020-06-16 | End: 2020-08-11

## 2020-06-16 NOTE — TELEPHONE ENCOUNTER
Please let Meseret Darby and her sister Negar Olivares know that I spoke with Dr Cheryle Pair regarding her blood work and CXR:    CXR   IMPRESSION:  Mild central pulmonary venous congestion.     BNP elevated but appears chronic  BMP - kidney function imrpoved but Sodium is low 122    Per his rec please have them do the following:    Increase Bumex to 2 mg BID    Add sodium chloride tablet 2 g twice a day  1500 mL fluid restriction  Repeat BMP on Friday

## 2020-06-16 NOTE — TELEPHONE ENCOUNTER
CXR   IMPRESSION:  Mild central pulmonary venous congestion. BNP elevated but appears chronic  BMP - kidney function imrpoved but Sodium is low 122    Currently taking Bumex 1 mg daily  We did have her double for 3 days - no improvement in symptoms     Dr Kerline King recommendations?

## 2020-06-19 ENCOUNTER — HOSPITAL ENCOUNTER (OUTPATIENT)
Age: 70
Discharge: HOME | End: 2020-06-19
Payer: MEDICARE

## 2020-06-19 DIAGNOSIS — I50.32 CHF (CONGESTIVE HEART FAILURE), NYHA CLASS II, CHRONIC, DIASTOLIC (HCC): ICD-10-CM

## 2020-06-19 LAB
ANION GAP SERPL CALCULATED.3IONS-SCNC: 9 MEQ/L (ref 8–16)
BUN BLDV-MCNC: 21 MG/DL (ref 7–22)
CALCIUM SERPL-MCNC: 9.1 MG/DL (ref 8.5–10.5)
CHLORIDE BLD-SCNC: 90 MEQ/L (ref 98–111)
CO2: 26 MEQ/L (ref 23–33)
CREAT SERPL-MCNC: 1.2 MG/DL (ref 0.4–1.2)
GFR SERPL CREATININE-BSD FRML MDRD: 45 ML/MIN/1.73M2
GLUCOSE BLD-MCNC: 94 MG/DL (ref 70–108)
POTASSIUM SERPL-SCNC: 3.6 MEQ/L (ref 3.5–5.2)
SODIUM BLD-SCNC: 125 MEQ/L (ref 135–145)

## 2020-06-19 PROCEDURE — 80048 BASIC METABOLIC PNL TOTAL CA: CPT

## 2020-06-19 PROCEDURE — 36415 COLL VENOUS BLD VENIPUNCTURE: CPT

## 2020-06-25 ENCOUNTER — OFFICE VISIT (OUTPATIENT)
Dept: NEPHROLOGY | Age: 70
End: 2020-06-25
Payer: MEDICARE

## 2020-06-25 ENCOUNTER — HOSPITAL ENCOUNTER (OUTPATIENT)
Age: 70
Discharge: HOME | End: 2020-06-25
Payer: MEDICARE

## 2020-06-25 VITALS
HEART RATE: 75 BPM | TEMPERATURE: 98.2 F | DIASTOLIC BLOOD PRESSURE: 52 MMHG | WEIGHT: 202 LBS | SYSTOLIC BLOOD PRESSURE: 86 MMHG | OXYGEN SATURATION: 98 % | BODY MASS INDEX: 43.71 KG/M2

## 2020-06-25 DIAGNOSIS — E87.1 HYPONATREMIA: ICD-10-CM

## 2020-06-25 DIAGNOSIS — E87.79 OTHER FLUID OVERLOAD: ICD-10-CM

## 2020-06-25 DIAGNOSIS — N18.30 CHRONIC KIDNEY DISEASE, STAGE III (MODERATE) (HCC): ICD-10-CM

## 2020-06-25 DIAGNOSIS — I10 ESSENTIAL HYPERTENSION: ICD-10-CM

## 2020-06-25 LAB
ANION GAP SERPL CALCULATED.3IONS-SCNC: 13 MEQ/L (ref 8–16)
BUN BLDV-MCNC: 22 MG/DL (ref 7–22)
CALCIUM SERPL-MCNC: 8.8 MG/DL (ref 8.5–10.5)
CHLORIDE BLD-SCNC: 94 MEQ/L (ref 98–111)
CO2: 25 MEQ/L (ref 23–33)
CREAT SERPL-MCNC: 1.2 MG/DL (ref 0.4–1.2)
GFR SERPL CREATININE-BSD FRML MDRD: 45 ML/MIN/1.73M2
GLUCOSE BLD-MCNC: 99 MG/DL (ref 70–108)
POTASSIUM SERPL-SCNC: 3.5 MEQ/L (ref 3.5–5.2)
SODIUM BLD-SCNC: 132 MEQ/L (ref 135–145)

## 2020-06-25 PROCEDURE — G8400 PT W/DXA NO RESULTS DOC: HCPCS | Performed by: INTERNAL MEDICINE

## 2020-06-25 PROCEDURE — 1036F TOBACCO NON-USER: CPT | Performed by: INTERNAL MEDICINE

## 2020-06-25 PROCEDURE — 3017F COLORECTAL CA SCREEN DOC REV: CPT | Performed by: INTERNAL MEDICINE

## 2020-06-25 PROCEDURE — 99214 OFFICE O/P EST MOD 30 MIN: CPT | Performed by: INTERNAL MEDICINE

## 2020-06-25 PROCEDURE — G8417 CALC BMI ABV UP PARAM F/U: HCPCS | Performed by: INTERNAL MEDICINE

## 2020-06-25 PROCEDURE — 4040F PNEUMOC VAC/ADMIN/RCVD: CPT | Performed by: INTERNAL MEDICINE

## 2020-06-25 PROCEDURE — 80048 BASIC METABOLIC PNL TOTAL CA: CPT

## 2020-06-25 PROCEDURE — 1090F PRES/ABSN URINE INCON ASSESS: CPT | Performed by: INTERNAL MEDICINE

## 2020-06-25 PROCEDURE — G8427 DOCREV CUR MEDS BY ELIG CLIN: HCPCS | Performed by: INTERNAL MEDICINE

## 2020-06-25 PROCEDURE — 36415 COLL VENOUS BLD VENIPUNCTURE: CPT

## 2020-06-25 PROCEDURE — 1123F ACP DISCUSS/DSCN MKR DOCD: CPT | Performed by: INTERNAL MEDICINE

## 2020-06-25 RX ORDER — BUMETANIDE 1 MG/1
1 TABLET ORAL 2 TIMES DAILY
Qty: 30 TABLET | Refills: 3 | Status: SHIPPED | OUTPATIENT
Start: 2020-06-25 | End: 2020-12-14 | Stop reason: SDUPTHER

## 2020-06-26 ENCOUNTER — TELEPHONE (OUTPATIENT)
Dept: NEPHROLOGY | Age: 70
End: 2020-06-26

## 2020-07-09 ENCOUNTER — OFFICE VISIT (OUTPATIENT)
Dept: CARDIOLOGY CLINIC | Age: 70
End: 2020-07-09
Payer: MEDICARE

## 2020-07-09 VITALS
HEART RATE: 72 BPM | DIASTOLIC BLOOD PRESSURE: 70 MMHG | WEIGHT: 202.6 LBS | HEIGHT: 57 IN | SYSTOLIC BLOOD PRESSURE: 138 MMHG | BODY MASS INDEX: 43.71 KG/M2

## 2020-07-09 PROCEDURE — G8427 DOCREV CUR MEDS BY ELIG CLIN: HCPCS | Performed by: INTERNAL MEDICINE

## 2020-07-09 PROCEDURE — G8417 CALC BMI ABV UP PARAM F/U: HCPCS | Performed by: INTERNAL MEDICINE

## 2020-07-09 PROCEDURE — 1090F PRES/ABSN URINE INCON ASSESS: CPT | Performed by: INTERNAL MEDICINE

## 2020-07-09 PROCEDURE — 4040F PNEUMOC VAC/ADMIN/RCVD: CPT | Performed by: INTERNAL MEDICINE

## 2020-07-09 PROCEDURE — 3017F COLORECTAL CA SCREEN DOC REV: CPT | Performed by: INTERNAL MEDICINE

## 2020-07-09 PROCEDURE — 1036F TOBACCO NON-USER: CPT | Performed by: INTERNAL MEDICINE

## 2020-07-09 PROCEDURE — 1123F ACP DISCUSS/DSCN MKR DOCD: CPT | Performed by: INTERNAL MEDICINE

## 2020-07-09 PROCEDURE — 99213 OFFICE O/P EST LOW 20 MIN: CPT | Performed by: INTERNAL MEDICINE

## 2020-07-09 PROCEDURE — G8400 PT W/DXA NO RESULTS DOC: HCPCS | Performed by: INTERNAL MEDICINE

## 2020-07-09 NOTE — PROGRESS NOTES
[address] [place] [address],[place] [address] [address] [place] [address]  Dept: [phone]  Dept Fax: [phone]  Loc: [phone]    Visit Date: 7/9/2020    Ms. Adarsh Madera is a 81 y.o. female  who presented for:  Chief Complaint   Patient presents with    Follow-up       HPI:   HPI   82 yo F with chronic RV failure, severe PH with PA 85/31 (mean 52), PVR = 10, who presents for follow-up. Following with renal for CKD and hyponatremia. No sig CAD. She follows with [place] [address] clinic at [facility]. Cr 1.2. Some mild LE edema around the ankles. Her mental status is improved. She is on Riociguat. She was on Leteris and was stopped due to facial swelling. No smoking. Chronic TIDWELL, somewhat better, her appetite improved. Current Outpatient Medications:     bumetanide (BUMEX) 1 MG tablet, Take 1 tablet by mouth 2 times daily, Disp: 30 tablet, Rfl: 3    sodium chloride 1 g tablet, Take 2 tablets by mouth 2 times daily, Disp: 120 tablet, Rfl: 3    Riociguat (ADEMPAS) 2.5 MG TABS, Take 2.5 mg by mouth 3 times daily, Disp: , Rfl:     docusate sodium (COLACE) 100 MG capsule, Take 100 mg by mouth as needed , Disp: , Rfl:     metoprolol tartrate (LOPRESSOR) 25 MG tablet, Take 0.5 tablets by mouth 2 times daily, Disp: 90 tablet, Rfl: 3    FLUoxetine (PROZAC) 40 MG capsule, Take 1 capsule by mouth daily, Disp: 90 capsule, Rfl: 3    aspirin 81 MG tablet, Take 81 mg by mouth daily , Disp: , Rfl:     Multiple Vitamins-Minerals (MULTI-VITAMIN GUMMIES PO), Take 2 tablets by mouth daily , Disp: , Rfl:     Past Medical History  Jimbo Sutherland  has a past medical history of Depression, Gout attack, Hypertension, Osteoarthritis, Pulmonary artery hypertension ([address] and Thrombocytopenia ([address] Social History  Jimbo Sutherland  reports that she has never smoked. She has never used smokeless tobacco. She reports that she does not drink alcohol or use drugs.     Family History  Jimbo Sutherland family history includes Arthritis in her mother; Breast Cancer (age of onset: 36) in her niece; COPD in her mother; Diabetes in her father and sister; Heart Disease in her maternal uncle; Sleep Apnea in her brother. There is no family history of bicuspid aortic valve, aneurysms, heart transplant, pacemakers, defibrillators, or sudden cardiac death. Past Surgical History   Past Surgical History:   Procedure Laterality Date    APPENDECTOMY      FACIAL NERVE SURGERY      1989       Review of Systems   Constitutional: Negative for chills and fever  HENT: Negative for congestion, sinus pressure, sneezing and sore throat. Eyes: Negative for pain, discharge, redness and itching. Respiratory: Negative for apnea, cough  Gastrointestinal: Negative for blood in stool, constipation, diarrhea   Endocrine: Negative for cold intolerance, heat intolerance, polydipsia. Genitourinary: Negative for dysuria, enuresis, flank pain and hematuria. Musculoskeletal: Negative for arthralgias, joint swelling and neck pain. Neurological: Negative for numbness and headaches. Psychiatric/Behavioral: Negative for agitation, confusion, decreased concentration and dysphoric mood. Objective:     /70   Pulse 72   Ht 4' 9\" (1.448 m)   Wt 202 lb 9.6 oz (91.9 kg)   BMI 43.84 kg/m²     Wt Readings from Last 3 Encounters:   07/09/20 202 lb 9.6 oz (91.9 kg)   06/25/20 202 lb (91.6 kg)   06/10/20 209 lb (94.8 kg)     BP Readings from Last 3 Encounters:   07/09/20 138/70   06/25/20 (!) 86/52   06/10/20 102/60       Nursing note and vitals reviewed. Physical Exam   Constitutional: Oriented to person, place, and time. Appears well-developed and well-nourished. HENT:   Head: Normocephalic and atraumatic. Eyes: EOM are normal. Pupils are equal, round, and reactive to light. Neck: Normal range of motion. Neck supple. No JVD present. Cardiovascular: Normal rate, regular rhythm, normal heart sounds and intact distal pulses.     No murmur Doppler W Color    Narrative Transthoracic Echocardiography Report (TTE)     Demographics      Patient Name    DAGOBERTO Salazar Gender                Female      MR #            [id number]     Race                                                      Ethnicity      Account #       [de-identified]     Room Number      Accession       [id number]     Date of Study         07/31/2019   Number      Date of Birth   1950    Referring Physician   Samia Vergara MD                                                       Lindababar Mcgraw CNP      Age             79 year(s)    Peter Nath MD                                 Physician     Procedure    Type of Study      TTE procedure:ECHOCARDIOGRAM COMPLETE 2D W DOPPLER W COLOR. Procedure Date  Date: 07/31/2019 Start: 09:57 AM    Study Location: Echo Lab  Technical Quality: Poor visualization due to restricted mobility. Indications:Congestive heart failure. Additional Medical History:Hypertension, Morbid Obesity, Congestive heart  failure, Osteoarthritis. Patient Status: Routine    Height: 57 inches Weight: 226 pounds BSA: 1.89 m^2 BMI: 48.91 kg/m^2    BP: 124/76 mmHg     Conclusions      Summary   Ejection fraction was estimated at 55-60%. Left atrial size was severely dilated. The right ventricular size was severely dilated with severely reduced   function. There is septal flattening in systole and diastole suggestive of   pressure and volume overload of the right ventricle. There was trace tricuspid regurgitation. Assuming RAP = 5 mmHg, the estimated RVSP = 27 mmHg (May be   underestimated, please clinically correlate, consider invasive hemodynamic   assessment if necessary). Ascending aorta = 3.3 cm.       Signature      ----------------------------------------------------------------   Electronically signed by Kristy Suarez MD (Interpreting Systolic Dimension:    AV Cusp Separation: 2 cmLA   Dimension: 3.9 2.8 cm                    Dimension: 3.3 cmAO Root   cm             LV Volume Diastolic: 82.0 Dimension: 3.2 cmLA Area: 27.6   LV FS:28.2 %   ml                        cm^2   LV PW          LV Volume Systolic: 99.2   Diastolic: 1.2 ml   cm             LV EDV/LV EDV Index: 65.9   Septum         ml/35 m^2LV ESV/LV ESV    RV Diastolic Dimension: 3.7 cm   Diastolic: 1.4 Index: 86.8 ml/16 m^2   cm             EF Calculated: 55.1 %     LA/Aorta: 1.03                                            Ascending Aorta: 3.3 cm                                            LA volume/Index: 95.5 ml /51m^2     Doppler Measurements & Calculations      MV Peak E-Wave: 45 cm/s    AV Peak Velocity: 133 LVOT Peak Velocity: 88.1   MV Peak A-Wave: 80.3 cm/s  cm/s                  cm/s   MV E/A Ratio: 0.56         AV Peak Gradient:     LVOT Peak Gradient: 3   MV Peak Gradient: 0.81     7.08 mmHg             mmHg   mmHg                                                    TV Peak E-Wave: 51.4 cm/s   MV Deceleration Time: 239                        TV Peak A-Wave: 86.6 cm/s   msec                              IVRT: 74 msec         TV Peak Gradient: 1.06                                                    mmHg   MV E' Septal Velocity: 4.6                       TR Velocity:235 cm/s   cm/s                       AV DVI (Vmax):0.66    TR Gradient:22.09 mmHg   MV A' Septal Velocity: 7.7                       PV Peak Velocity: 75.1   cm/s                                             cm/s   MV E' Lateral Velocity:                          PV Peak Gradient: 2.26   8.1 cm/s                                         mmHg   MV A' Lateral Velocity:   13.4 cm/s   E/E' septal: 9.78                                OH ED Velocity: 128 cm/s   E/E' lateral: 5.56     [email or web address] [id number] Assessment/Plan   Chronic RV failur, NYHA II-III  Severe PH with PA 85/31 (mean 52), PVR = 10, on Pemdas  No significant CAD  Preserved EF  Has diuresed almost 60 lbs, BP and HR well controlled, Adempas helping, [facility] PH following. Depending on progression, could consider repeat RHC. [facility] checking echoes. PT/OT. Continue compression stockings. Nephrology managing diuretics. Discussed diet/exercise/BP/weight loss/health lifestyle choices/lipids; the patient understands the goals and will try to comply.      Disposition:  6-12 months         Electronically signed by Marleny Gómez MD   7/9/2020 at 9:50 AM EDT

## 2020-07-17 ENCOUNTER — HOSPITAL ENCOUNTER (OUTPATIENT)
Dept: WOMENS IMAGING | Age: 70
Discharge: HOME | End: 2020-07-17
Payer: MEDICARE

## 2020-07-17 ENCOUNTER — HOSPITAL ENCOUNTER (OUTPATIENT)
Dept: WOMENS IMAGING | Age: 70
End: 2020-07-17
Payer: MEDICARE

## 2020-07-17 PROCEDURE — G0279 TOMOSYNTHESIS, MAMMO: HCPCS

## 2020-07-29 ENCOUNTER — VIRTUAL VISIT (OUTPATIENT)
Dept: FAMILY MEDICINE CLINIC | Age: 70
End: 2020-07-29
Payer: MEDICARE

## 2020-07-29 PROCEDURE — 1123F ACP DISCUSS/DSCN MKR DOCD: CPT | Performed by: FAMILY MEDICINE

## 2020-07-29 PROCEDURE — 4040F PNEUMOC VAC/ADMIN/RCVD: CPT | Performed by: FAMILY MEDICINE

## 2020-07-29 PROCEDURE — 3017F COLORECTAL CA SCREEN DOC REV: CPT | Performed by: FAMILY MEDICINE

## 2020-07-29 PROCEDURE — 1090F PRES/ABSN URINE INCON ASSESS: CPT | Performed by: FAMILY MEDICINE

## 2020-07-29 PROCEDURE — 99214 OFFICE O/P EST MOD 30 MIN: CPT | Performed by: FAMILY MEDICINE

## 2020-07-29 PROCEDURE — G8400 PT W/DXA NO RESULTS DOC: HCPCS | Performed by: FAMILY MEDICINE

## 2020-07-29 PROCEDURE — G8427 DOCREV CUR MEDS BY ELIG CLIN: HCPCS | Performed by: FAMILY MEDICINE

## 2020-07-29 RX ORDER — PREDNISONE 20 MG/1
TABLET ORAL
Qty: 15 TABLET | Refills: 0 | Status: SHIPPED | OUTPATIENT
Start: 2020-07-29 | End: 2020-08-13 | Stop reason: SDUPTHER

## 2020-07-29 ASSESSMENT — ENCOUNTER SYMPTOMS
SORE THROAT: 0
WHEEZING: 0
CHEST TIGHTNESS: 0
DIARRHEA: 0
EYE PAIN: 0
COUGH: 0
SHORTNESS OF BREATH: 0
RHINORRHEA: 0
VOMITING: 0
ABDOMINAL PAIN: 0
NAUSEA: 0
BACK PAIN: 0
BLOOD IN STOOL: 0
CONSTIPATION: 0

## 2020-07-29 NOTE — PROGRESS NOTES
2020    TELEHEALTH EVALUATION -- Audio/Visual (During LDTYQ-08 public health emergency)  Patient at home. Physician at office. Visit using synchronous telecommunication system. HPI:    Isa Amaya (:  1950) has requested an audio/video evaluation for the following concern(s):    Jewel Councilman seen today for 2 weeks of top of her feet hurting. Feels it is gout. Has had previously. Does have swelling in the feet, takes bumex. It is affecting her ambulation. They are wrapping her legs. Wondering about PT to help with ambulation. Also has had low sodium and is on supplement, needs repeat blood work. Single, non smoker, pmh reviewed. Reviewed last BMI of 44. Review of Systems   Constitutional: Negative for chills, fatigue, fever and unexpected weight change. HENT: Negative for congestion, ear pain, rhinorrhea and sore throat. Eyes: Negative for pain and visual disturbance. Respiratory: Negative for cough, chest tightness, shortness of breath and wheezing. Cardiovascular: Positive for leg swelling. Negative for chest pain and palpitations. Gastrointestinal: Negative for abdominal pain, blood in stool, constipation, diarrhea, nausea and vomiting. Genitourinary: Negative for difficulty urinating, frequency, hematuria and urgency. Musculoskeletal: Negative for back pain, joint swelling, myalgias and neck pain. Bilateral foot pain   Skin: Negative for rash. Neurological: Positive for weakness. Negative for dizziness and headaches. Hematological: Negative for adenopathy. Does not bruise/bleed easily. Psychiatric/Behavioral: Negative for behavioral problems and sleep disturbance. The patient is not nervous/anxious. Prior to Visit Medications    Medication Sig Taking? Authorizing Provider   predniSONE (DELTASONE) 20 MG tablet Take 1 tab BID x 5 days, then 1 tab daily x 5 days. Take with food.  Yes Nissa Christensen MD   bumetanide (BUMEX) 1 MG tablet Take 1 tablet by mouth 2 times daily  Gita Elizondo MD   sodium chloride 1 g tablet Take 2 tablets by mouth 2 times daily  Cielo Klein APRN - CNP   Riociguat (ADEMPAS) 2.5 MG TABS Take 2.5 mg by mouth 3 times daily  Historical Provider, MD   docusate sodium (COLACE) 100 MG capsule Take 100 mg by mouth as needed   Historical Provider, MD   metoprolol tartrate (LOPRESSOR) 25 MG tablet Take 0.5 tablets by mouth 2 times daily  Celi Miranda PA-C   FLUoxetine (PROZAC) 40 MG capsule Take 1 capsule by mouth daily  Rosa Waggoner MD   aspirin 81 MG tablet Take 81 mg by mouth daily   Historical Provider, MD   Multiple Vitamins-Minerals (MULTI-VITAMIN GUMMIES PO) Take 2 tablets by mouth daily   Historical Provider, MD       Social History     Tobacco Use    Smoking status: Never Smoker    Smokeless tobacco: Never Used   Substance Use Topics    Alcohol use: No    Drug use: No            PHYSICAL EXAMINATION:  [ INSTRUCTIONS:  \"[x]\" Indicates a positive item  \"[]\" Indicates a negative item  -- DELETE ALL ITEMS NOT EXAMINED]  Vital Signs: (As obtained by patient/caregiver or practitioner observation)    Blood pressure-  Heart rate-    Respiratory rate-    Temperature-  Pulse oximetry-     Constitutional: [x] Appears well-developed and well-nourished [x] No apparent distress      [] Abnormal-   Mental status  [x] Alert and awake  [x] Oriented to person/place/time [x]Able to follow commands      Eyes:  EOM    []  Normal  [] Abnormal-  Sclera  [x]  Normal  [] Abnormal -         Discharge [x]  None visible  [] Abnormal -    HENT:   [x] Normocephalic, atraumatic.   [] Abnormal   [] Mouth/Throat: Mucous membranes are moist.     External Ears [x] Normal  [] Abnormal-     Neck: [x] No visualized mass     Pulmonary/Chest: [x] Respiratory effort normal.  [x] No visualized signs of difficulty breathing or respiratory distress        [] Abnormal-      Musculoskeletal:   [] Normal gait with no signs of ataxia         [x] Normal range of motion of neck        [] Abnormal-       Neurological:        [x] No Facial Asymmetry (Cranial nerve 7 motor function) (limited exam to video visit)          [x] No gaze palsy        [] Abnormal-         Skin:        [x] No significant exanthematous lesions or discoloration noted on facial skin         [] Abnormal-            Psychiatric:       [x] Normal Affect [x] No Hallucinations        [] Abnormal-     Other pertinent observable physical exam findings-     ASSESSMENT/PLAN:  1. Acute idiopathic gout of foot, unspecified laterality    - predniSONE (DELTASONE) 20 MG tablet; Take 1 tab BID x 5 days, then 1 tab daily x 5 days. Take with food. Dispense: 15 tablet; Refill: 0    2. Hyponatremia    - Basic Metabolic Panel; Future    3. Generalized weakness    - External Referral To Physical Therapy    4. Morbid obesity with BMI of 40.0-44.9, adult ([facility] [address]  -healthy diet  - External Referral To Physical Therapy      No follow-ups on file. Cr Thomas is a 81 y.o. female being evaluated by a Virtual Visit (video visit) encounter to address concerns as mentioned above. A caregiver was present when appropriate. Due to this being a TeleHealth encounter (During TUKUC-78 public health emergency), evaluation of the following organ systems was limited: Vitals/Constitutional/EENT/Resp/CV/GI//MS/Neuro/Skin/Heme-Lymph-Imm. Pursuant to the emergency declaration under the [facility], [address] [place] [address] authority and the [employer] Act, this Virtual Visit was conducted with patient's (and/or legal guardian's) consent, to reduce the patient's risk of exposure to COVID-19 and provide necessary medical care. The patient (and/or legal guardian) has also been advised to contact this office for worsening conditions or problems, and seek emergency medical treatment and/or call 911 if deemed necessary.      Patient identification was verified at the start of the visit: Yes    Total time spent on this encounter: Not billed by time    Services were provided through a video synchronous discussion virtually to substitute for in-person clinic visit. Patient and provider were located at their individual homes. --Myles Leach MD on 7/29/2020 at 2:04 PM    An electronic signature was used to authenticate this note.

## 2020-07-29 NOTE — PATIENT INSTRUCTIONS
\"crash diet. \" Losing a lot of weight in a short amount of time can cause a gout attack. When should you call for help? Call your doctor now or seek immediate medical care if:  · You have a fever. · The joint is so painful you cannot use it. · You have sudden, unexplained swelling, redness, warmth, or severe pain in one or more joints. Watch closely for changes in your health, and be sure to contact your doctor if:  · You have joint pain. · Your symptoms get worse or are not improving after 2 or 3 days. Where can you learn more? Go to https://VAZATApeFilmySphere Entertainment [employer]vt [employer].AMIA Systems. org and sign in to your [employer] account. Enter L603 in the Straker Translations box to learn more about \"Gout: Care Instructions. \"     If you do not have an account, please click on the \"Sign Up Now\" link. Current as of: December 9, 2019               Content Version: 12.5  © 1973-0565 [employer]. Care instructions adapted under license by [facility] ([facility]). If you have questions about a medical condition or this instruction, always ask your healthcare professional. [place] [address] any warranty or liability for your use of this information. Patient Education        Hyponatremia: Care Instructions  Your Care Instructions     Hyponatremia (say \"xt-io-ggh-TREE-kurt-uh\") means that you don't have enough sodium in your blood. It can cause nausea, vomiting, and headaches. Or you may not feel hungry. In serious cases, it can cause seizures, a coma, or even death. Hyponatremia is not a disease. It is a problem caused by something else, such as medicines or exercising for a long time in hot weather. You can get hyponatremia if you lose a lot of fluids and then you drink a lot of water or other liquids that don't have much sodium. You can also get it if you have kidney, liver, heart, or other health problems.   Treatment is focused on getting your sodium levels back to normal.  Follow-up care is a key part of Eating Healthy Foods: Care Instructions  Your Care Instructions     Eating healthy foods can help lower your risk for disease. Healthy food gives you energy and keeps your heart strong, your brain active, your muscles working, and your bones strong. A healthy diet includes a variety of foods from the basic food groups: grains, vegetables, fruits, milk and milk products, and meat and beans. Some people may eat more of their favorite foods from only one food group and, as a result, miss getting the nutrients they need. So, it is important to pay attention not only to what you eat but also to what you are missing from your diet. You can eat a healthy, balanced diet by making a few small changes. Follow-up care is a key part of your treatment and safety. Be sure to make and go to all appointments, and call your doctor if you are having problems. It's also a good idea to know your test results and keep a list of the medicines you take. How can you care for yourself at home? Look at what you eat  · Keep a food diary for a week or two and record everything you eat or drink. Track the number of servings you eat from each food group. · For a balanced diet every day, eat a variety of:  ? 6 or more ounce-equivalents of grains, such as cereals, breads, crackers, rice, or pasta, every day. An ounce-equivalent is 1 slice of bread, 1 cup of ready-to-eat cereal, or ½ cup of cooked rice, cooked pasta, or cooked cereal.  ? 2½ cups of vegetables, especially:  § Dark-green vegetables such as broccoli and spinach. § Orange vegetables such as carrots and sweet potatoes. § Dry beans (such as mann and kidney beans) and peas (such as lentils). ? 2 cups of fresh, frozen, or canned fruit. A small apple or 1 banana or orange equals 1 cup. ? 3 cups of nonfat or low-fat milk, yogurt, or other milk products. ? 5½ ounces of meat and beans, such as chicken, fish, lean meat, beans, nuts, and seeds.  One egg, 1 tablespoon of peanut cream sauce. ? A vegetable wrap or grilled chicken wrap. ? Broiled or poached food instead of fried or breaded items. Make healthy choices easy  · Buy packaged, prewashed, ready-to-eat fresh vegetables and fruits, such as baby carrots, salad mixes, and chopped or shredded broccoli and cauliflower. · Buy packaged, presliced fruits, such as melon or pineapple. · Choose 100% fruit or vegetable juice instead of soda. Limit juice intake to 4 to 6 oz (½ to ¾ cup) a day. · Blend low-fat yogurt, fruit juice, and canned or frozen fruit to make a smoothie for breakfast or a snack. Where can you learn more? Go to https://Stormfisher Biogas.Basetex Group. org and sign in to your [employer] account. Enter X611 in the DeepRockDrive box to learn more about \"Eating Healthy Foods: Care Instructions. \"     If you do not have an account, please click on the \"Sign Up Now\" link. Current as of: August 22, 2019               Content Version: 12.5  © 8454-7221 [email or web address]. Care instructions adapted under license by [facility] [address] If you have questions about a medical condition or this instruction, always ask your healthcare professional. [place] [address] any warranty or liability for your use of this information. Patient Education        Weakness: Care Instructions  Your Care Instructions     Weakness is a lack of physical or muscle strength. You may feel that you need to make extra effort to move your arms, legs, or other muscles. Generalized weakness means that you feel weak in most areas of your body. Another type of weakness may affect just one muscle or group of muscles. You may feel weak and tired after you have done too much activity, such as taking an extra-long hike. This is not a serious problem. It often goes away on its own. Feeling weak can also be caused by medical conditions like thyroid problems, depression, or a virus. Sometimes the cause can be serious.  Your doctor may want to do more tests to try to find the cause of the weakness. The doctor has checked you carefully, but problems can develop later. If you notice any problems or new symptoms, get medical treatment right away. Follow-up care is a key part of your treatment and safety. Be sure to make and go to all appointments, and call your doctor if you are having problems. It's also a good idea to know your test results and keep a list of the medicines you take. How can you care for yourself at home? · Rest when you feel tired. · Be safe with medicines. If your doctor prescribed medicine, take it exactly as prescribed. Call your doctor if you think you are having a problem with your medicine. You will get more details on the specific medicines your doctor prescribes. · Do not skip meals. Eating a balanced diet may increase your energy level. · Get some physical activity every day, but do not get too tired. When should you call for help? Call your doctor now or seek immediate medical care if:  · You have new or worse weakness. · You are dizzy or lightheaded, or you feel like you may faint. Watch closely for changes in your health, and be sure to contact your doctor if:  · You do not get better as expected. Where can you learn more? Go to https://VetCloudpeSpoke.nuevoStage. org and sign in to your WeddingLovely account. Enter [id number] [id number] in the Novaliq box to learn more about \"Weakness: Care Instructions. \"     If you do not have an account, please click on the \"Sign Up Now\" link. Current as of: June 26, 2019               Content Version: 12.5  © 5418-7465 Healthwise, Incorporated. Care instructions adapted under license by [facility] ([facility]). If you have questions about a medical condition or this instruction, always ask your healthcare professional. [address] any warranty or liability for your use of this information.

## 2020-08-10 ENCOUNTER — HOSPITAL ENCOUNTER (OUTPATIENT)
Age: 70
Discharge: HOME | End: 2020-08-10
Payer: MEDICARE

## 2020-08-10 ENCOUNTER — TELEPHONE (OUTPATIENT)
Dept: CARDIOLOGY CLINIC | Age: 70
End: 2020-08-10

## 2020-08-10 DIAGNOSIS — I50.33 CHF (CONGESTIVE HEART FAILURE), NYHA CLASS III, ACUTE ON CHRONIC, DIASTOLIC (HCC): ICD-10-CM

## 2020-08-10 DIAGNOSIS — E87.1 HYPONATREMIA: ICD-10-CM

## 2020-08-10 PROCEDURE — 80048 BASIC METABOLIC PNL TOTAL CA: CPT

## 2020-08-10 PROCEDURE — 36415 COLL VENOUS BLD VENIPUNCTURE: CPT

## 2020-08-11 ENCOUNTER — TELEPHONE (OUTPATIENT)
Dept: CARDIOLOGY CLINIC | Age: 70
End: 2020-08-11

## 2020-08-11 LAB
ANION GAP SERPL CALCULATED.3IONS-SCNC: 10 MEQ/L (ref 8–16)
BUN BLDV-MCNC: 21 MG/DL (ref 7–22)
CALCIUM SERPL-MCNC: 9 MG/DL (ref 8.5–10.5)
CHLORIDE BLD-SCNC: 102 MEQ/L (ref 98–111)
CO2: 31 MEQ/L (ref 23–33)
CREAT SERPL-MCNC: 0.9 MG/DL (ref 0.4–1.2)
GFR SERPL CREATININE-BSD FRML MDRD: 62 ML/MIN/1.73M2
GLUCOSE BLD-MCNC: 89 MG/DL (ref 70–108)
POTASSIUM SERPL-SCNC: 3.5 MEQ/L (ref 3.5–5.2)
SODIUM BLD-SCNC: 143 MEQ/L (ref 135–145)

## 2020-08-11 RX ORDER — SODIUM CHLORIDE 1000 MG
1 TABLET, SOLUBLE MISCELLANEOUS 2 TIMES DAILY
Qty: 120 TABLET | Refills: 3
Start: 2020-08-11 | End: 2020-12-22 | Stop reason: SDUPTHER

## 2020-08-11 RX ORDER — POTASSIUM CHLORIDE 750 MG/1
10 TABLET, EXTENDED RELEASE ORAL DAILY
Qty: 90 TABLET | Refills: 1 | Status: SHIPPED | OUTPATIENT
Start: 2020-08-11 | End: 2020-12-22 | Stop reason: SDUPTHER

## 2020-08-11 NOTE — TELEPHONE ENCOUNTER
BMP reviewed  Kidney function is the best it has been in months  Potassium is low normal 3.5  Sodium trending up 143  Decrease Sodium Cl tabs to 1 mg bid (from 2 mg)  Add Potassium 10 mEq daily  Keep Bumex 1 mg bid  Repeat BMP in 2 weeks

## 2020-08-13 ENCOUNTER — TELEPHONE (OUTPATIENT)
Dept: FAMILY MEDICINE CLINIC | Age: 70
End: 2020-08-13

## 2020-08-13 RX ORDER — PREDNISONE 20 MG/1
TABLET ORAL
Qty: 15 TABLET | Refills: 0 | Status: SHIPPED | OUTPATIENT
Start: 2020-08-13 | End: 2020-09-08

## 2020-08-13 NOTE — TELEPHONE ENCOUNTER
Patient's caretaker Mando Du called. She states that now Kenzie's hand is puffy and swollen. She's not sure if you can get gout in your hands, but is asking if you think she should have another round of Prednisone or what do you recommend? She uses [facility] in [address] [place] [address] Dr. Kelsi Wiley back at [phone]

## 2020-08-24 ENCOUNTER — HOSPITAL ENCOUNTER (OUTPATIENT)
Age: 70
Discharge: HOME | End: 2020-08-24
Payer: MEDICARE

## 2020-08-24 DIAGNOSIS — I50.32 CHF (CONGESTIVE HEART FAILURE), NYHA CLASS II, CHRONIC, DIASTOLIC (HCC): ICD-10-CM

## 2020-08-24 LAB
ANION GAP SERPL CALCULATED.3IONS-SCNC: 10 MEQ/L (ref 8–16)
BUN BLDV-MCNC: 33 MG/DL (ref 7–22)
CALCIUM SERPL-MCNC: 9.5 MG/DL (ref 8.5–10.5)
CHLORIDE BLD-SCNC: 100 MEQ/L (ref 98–111)
CO2: 28 MEQ/L (ref 23–33)
CREAT SERPL-MCNC: 0.9 MG/DL (ref 0.4–1.2)
GFR SERPL CREATININE-BSD FRML MDRD: 62 ML/MIN/1.73M2
GLUCOSE BLD-MCNC: 114 MG/DL (ref 70–108)
POTASSIUM SERPL-SCNC: 4.4 MEQ/L (ref 3.5–5.2)
SODIUM BLD-SCNC: 138 MEQ/L (ref 135–145)

## 2020-08-24 PROCEDURE — 80048 BASIC METABOLIC PNL TOTAL CA: CPT

## 2020-08-24 PROCEDURE — 36415 COLL VENOUS BLD VENIPUNCTURE: CPT

## 2020-08-25 NOTE — TELEPHONE ENCOUNTER
BMP reviewed  Sodium wnl 138, K+ 4.4  BUN slightly higher but Cr and GFR unchanged  Please see how she is doing - call sister Irineomio Peralta

## 2020-08-25 NOTE — TELEPHONE ENCOUNTER
Spoke with Vilma Robison   She is doing great-  Down to 181lb    Physical therapy,   And is up and moving around     Gout with the right hand,  She had been taking Predisone. -  Done now.

## 2020-09-08 ENCOUNTER — OFFICE VISIT (OUTPATIENT)
Dept: CARDIOLOGY CLINIC | Age: 70
End: 2020-09-08
Payer: MEDICARE

## 2020-09-08 VITALS
DIASTOLIC BLOOD PRESSURE: 78 MMHG | HEART RATE: 72 BPM | SYSTOLIC BLOOD PRESSURE: 130 MMHG | WEIGHT: 189 LBS | BODY MASS INDEX: 34.78 KG/M2 | HEIGHT: 62 IN | OXYGEN SATURATION: 92 %

## 2020-09-08 PROCEDURE — G8417 CALC BMI ABV UP PARAM F/U: HCPCS | Performed by: NURSE PRACTITIONER

## 2020-09-08 PROCEDURE — 99214 OFFICE O/P EST MOD 30 MIN: CPT | Performed by: NURSE PRACTITIONER

## 2020-09-08 PROCEDURE — 4040F PNEUMOC VAC/ADMIN/RCVD: CPT | Performed by: NURSE PRACTITIONER

## 2020-09-08 PROCEDURE — G8427 DOCREV CUR MEDS BY ELIG CLIN: HCPCS | Performed by: NURSE PRACTITIONER

## 2020-09-08 PROCEDURE — G8400 PT W/DXA NO RESULTS DOC: HCPCS | Performed by: NURSE PRACTITIONER

## 2020-09-08 PROCEDURE — 1123F ACP DISCUSS/DSCN MKR DOCD: CPT | Performed by: NURSE PRACTITIONER

## 2020-09-08 PROCEDURE — 3017F COLORECTAL CA SCREEN DOC REV: CPT | Performed by: NURSE PRACTITIONER

## 2020-09-08 PROCEDURE — 1036F TOBACCO NON-USER: CPT | Performed by: NURSE PRACTITIONER

## 2020-09-08 PROCEDURE — 1090F PRES/ABSN URINE INCON ASSESS: CPT | Performed by: NURSE PRACTITIONER

## 2020-09-08 ASSESSMENT — ENCOUNTER SYMPTOMS
SHORTNESS OF BREATH: 1
COLOR CHANGE: 0
ABDOMINAL PAIN: 0
APNEA: 0
NAUSEA: 0
CHEST TIGHTNESS: 0
COUGH: 0
WHEEZING: 0
ABDOMINAL DISTENTION: 0

## 2020-09-08 NOTE — TELEPHONE ENCOUNTER
Nhung Cheney needs refill of   Requested Prescriptions     Pending Prescriptions Disp Refills    FLUoxetine (PROZAC) 40 MG capsule [Pharmacy Med Name: FLUOXETINE HCL 40 MG CAPSULE] 90 capsule 3     Sig: take 1 capsule by mouth once daily       Last Filled on:  09/26/2020    Last Visit Date:  7/29/2020    Next Visit Date:    Visit date not found     PLEASE CALL AND SCHEDULE AWV FOR REFILLS

## 2020-09-08 NOTE — PROGRESS NOTES
[facility]       Visit Date: 9/8/2020  Cardiologist:  Dr. Hiram Qureshi   Primary Care Physician: Dr. Tamela Hall MD    Brooklyn Srinivasan is a 81 y.o. female who presents today for:  Chief Complaint   Patient presents with    Congestive Heart Failure       HPI: Obtained from patient and chart    Brooklyn Srinivasan is a 81 y.o. female who presents to the office for a follow up visit in the heart failure clinic. Hx depression, ECHO 7/31/19 revealed EF 55-60%, RSVP 27 mmHg although RV is severely dilated. IAU 3/00/8777  She was started on Letairis and Adempas per Dr Glenn Fernandez [facility] She developed weight gain with increased SOB and swelling with the Letairis so this was stopped last week. Dr Glenn Fernandez recommended admission for IV diuresis. She came to the ER but the ER physician sent her home. We doubled her Bumex for 3 days, her symptoms are improving but she is still 5 pounds up with LE edema. She sleeps in a chair for comfort. She is incontinent at times with urine. She is making urine. She can perform ADLs without SOB or fatigue. She is not compliant with diet. She eats TV dinners and ham sandwiches. She also eats chips or puffed popcorn as a snack.     8/27/19 R[facility]:  RA 15, RV 81/21, PA 85/31 with a mean of 52, wedge pressure 18 mmHg, PA saturation 66%, RA saturation 66%,  cardiac output 3.4, cardiac index 1.8. LHC no significant CAD   Aggressive diuresis was tried for the above in the hospital but ultimately had ALVIN.      She was admitted again in Sept 2019 for hypotension, dehydration/ALVIN after she was not feeling well and decreased appetite, not drinking much.    This resolved. Her Bumex was decreased. She was taken off her Lisinopril/HCTZ. Her Bps went up so she was put on Norvasc. Her legs started swelling and she was taken off the Norvasc by her Nephrologist and put back on low dose Bumex.       She was started on Adempas and Letaris in Dec 2019  by [facility].  She developed worsening CHF after starting so the Letaris was stopped. She was given iV diuresis.    2/17/20  Weight is down to 201 pounds on her home scale. She is down 13 pounds form her last clinic visit. LE edema much improved. She has been trying to not eat as many potato chips. Sleeps in a recliner which she has for years. Daniela Colon is walking around with there walker, admits to not as much as she could. She does complain of constipation at times. She can perform ADLs without SOB or fatigue.      6/10/20  Her weight is up 8 pounds from previous. She has some more LE edema. She is not compliant with her diet, has been eating more frozen meals. She has not been active, sits in her chair and watches TV. Her sister helps with her shower. Some SOB with activity.      9/8/20  Her weight is down 17 pounds since June 20. She has had a few episodes of gout, treated with Prednisone. She is c/o the top of her right foot hurting. It is a little red and swollen. Her LE are improving with the swelling since her last OV. She is participating in PT. Using a walker. Looks the best I have seen her. Trying to monitor what she eats. Able to perform ADLs without SOB or fatigue.      Accompanied by: sister Cherelle Jolly  Last hospital admission related to Heart Failure:  none  Chest Pain: no  Worsening SOB: no  Worsening Orthopnea/PND: no  Edema: improving  Any extra diuretic use: no  Weight gain: no  Fatigue: some  Abdominal bloating: no  Appetite: good  BARBER: no  Cough: occasional   Compliant checking home weight: [id number] lbs  Compliant checking blood pressure: [id number]      Past Medical History:   Diagnosis Date    Depression     Gout attack     Hypertension     Osteoarthritis     Pulmonary artery hypertension (HCC)     Thrombocytopenia (HCC)      Past Surgical History:   Procedure Laterality Date    APPENDECTOMY      FACIAL NERVE SURGERY      1989     Family History   Problem Relation Age of Onset    Diabetes Father     Arthritis Mother     COPD Mother  Diabetes Sister     Heart Disease Maternal Uncle     Breast Cancer Niece 36    Sleep Apnea Brother     Asthma Neg Hx     Birth Defects Neg Hx     Cancer Neg Hx     Depression Neg Hx     Early Death Neg Hx     Hearing Loss Neg Hx     High Blood Pressure Neg Hx     High Cholesterol Neg Hx     Kidney Disease Neg Hx     Learning Disabilities Neg Hx     Mental Illness Neg Hx     Mental Retardation Neg Hx     Miscarriages / Stillbirths Neg Hx     Stroke Neg Hx     Substance Abuse Neg Hx     Vision Loss Neg Hx     Other Neg Hx      Social History     Tobacco Use    Smoking status: Never Smoker    Smokeless tobacco: Never Used   Substance Use Topics    Alcohol use: No     Current Outpatient Medications   Medication Sig Dispense Refill    sodium chloride 1 g tablet Take 1 tablet by mouth 2 times daily 120 tablet 3    potassium chloride (KLOR-CON M) 10 MEQ extended release tablet Take 1 tablet by mouth daily 90 tablet 1    bumetanide (BUMEX) 1 MG tablet Take 1 tablet by mouth 2 times daily 30 tablet 3    Riociguat (ADEMPAS) 2.5 MG TABS Take 2.5 mg by mouth 3 times daily      docusate sodium (COLACE) 100 MG capsule Take 100 mg by mouth as needed       metoprolol tartrate (LOPRESSOR) 25 MG tablet Take 0.5 tablets by mouth 2 times daily 90 tablet 3    FLUoxetine (PROZAC) 40 MG capsule Take 1 capsule by mouth daily 90 capsule 3    aspirin 81 MG tablet Take 81 mg by mouth daily       Multiple Vitamins-Minerals (MULTI-VITAMIN GUMMIES PO) Take 2 tablets by mouth daily        No current facility-administered medications for this visit. No Known Allergies    SUBJECTIVE:   Review of Systems   Constitutional: Negative for activity change, appetite change, fatigue and fever. HENT: Negative for congestion. Respiratory: Positive for shortness of breath (with exertion ). Negative for apnea, cough, chest tightness and wheezing. Cardiovascular: Positive for leg swelling.  Negative for chest pain Encounters:   09/08/20 130/78   07/09/20 138/70   06/25/20 (!) 86/52     Pulse Readings from Last 3 Encounters:   09/08/20 72   07/09/20 72   06/25/20 75     Body mass index is 34.57 kg/m². ECHO:   7/31/19   Summary   Ejection fraction was estimated at 55-60%. Left atrial size was severely dilated. The right ventricular size was severely dilated with severely reduced   function. There is septal flattening in systole and diastole suggestive of   pressure and volume overload of the right ventricle. There was trace tricuspid regurgitation. Assuming RAP = 5 mmHg, the estimated RVSP = 27 mmHg (May be   underestimated, please clinically correlate, consider invasive hemodynamic   assessment if necessary). Ascending aorta = 3.3 cm. Signature      ----------------------------------------------------------------   Electronically signed by Bear Pimentel MD (Interpreting   physician) on 07/31/2019 at 02:30 PM   ----------------------------------------------------------------      Findings      Mitral Valve   The mitral valve structure was normal with normal leaflet separation. DOPPLER: The transmitral velocity was within the normal range with no   evidence for mitral stenosis. There was no evidence of mitral   regurgitation. Aortic Valve   The aortic valve was trileaflet with normal thickness and cuspal   separation. DOPPLER: Transaortic velocity was within the normal range with   no evidence of aortic stenosis. There was no evidence of aortic   regurgitation. Tricuspid Valve   The tricuspid valve structure was normal with normal leaflet separation. DOPPLER: There was no evidence of tricuspid stenosis. There was trace   tricuspid regurgitation. Assuming RAP = 5 mmHg, the estimated RVSP = 27   mmHg. Pulmonic Valve   The pulmonic valve leaflets were not well seen. DOPPLER: The transpulmonic   velocity was within the normal range with no evidence for regurgitation.       Left Atrium   Left atrial size was severely dilated. Left Ventricle   Normal left ventricular size and systolic function. There were no regional wall motion abnormalities. Asymmetric septal hypertrophy. Ejection fraction was estimated at 55-60%. E/A flow reversal noted. Suggestive of diastolic dysfunction. Right Atrium   Right atrial size was normal.      Right Ventricle   The right ventricular size was severely dilated with severely reduced   function. There is septal flattening in systole and diastole suggestive of   pressure and volume overload of the right ventricle. Pericardial Effusion   The pericardium was normal in appearance with no evidence of a pericardial   effusion. Pleural Effusion   No evidence of pleural effusion. Aorta / Great Vessels   -Ascending aorta = 3.3 cm. -IVC not well seen.     Results reviewed:  BNP:   Lab Results   Component Value Date    BNP 23 10/04/2017    PROBNP 1347.0 (H) 06/15/2020     CBC:   Lab Results   Component Value Date    WBC 7.1 03/06/2020    RBC 3.77 03/06/2020    RBC 4.15 01/09/2018    HGB 11.0 03/06/2020    HCT 37.3 03/06/2020     03/06/2020     CMP:    Lab Results   Component Value Date     08/24/2020    K 4.4 08/24/2020    K 4.1 01/24/2020     08/24/2020    CO2 28 08/24/2020    BUN 33 08/24/2020    CREATININE 0.9 08/24/2020    LABGLOM 62 08/24/2020    GLUCOSE 114 08/24/2020    GLUCOSE 103 01/09/2018    CALCIUM 9.5 08/24/2020     Hepatic Function Panel:    Lab Results   Component Value Date    ALKPHOS 110 03/06/2020    ALT 11 03/06/2020    AST 17 03/06/2020    PROT 8.2 03/06/2020    BILITOT 0.7 03/06/2020    BILIDIR 0.3 03/06/2020    LABALBU 3.4 03/06/2020     Magnesium:    Lab Results   Component Value Date    MG 2.1 09/17/2019     PT/INR:    Lab Results   Component Value Date    INR 1.09 01/24/2020     Lipids:    Lab Results   Component Value Date    TRIG 66 06/18/2019    HDL 43 06/18/2019    LDLCALC 75 06/18/2019    LABVLDL 44 01/09/2018       ASSESSMENT AND PLAN:   The patient's condition/symptoms are Stable      Diagnosis Orders   1. CHF (congestive heart failure), NYHA class II, chronic, diastolic ([facility] [address]     2. Essential hypertension     3. Pulmonary HTN ([facility] [address]     4. Irregular heart rhythm         Plan:  · GDMT   · ACE/ARB/ARNi: off per Dr Bijan Glaser in June 2020 d/t low BPs  · Beta Blocker: Metoprolol 12.5 mg bid  · Aldosterone antagonist: no  · Diuretic/Potassium: Bumex 1 mg bid, Potassium 10 mEq daily  · Hydralazine/Nitrate: no  · Other: Sodium chloride 1 gm bid, Adempas, ASA 81 mg  No signs of fluid overload. Swelling in LE is improving. She is participating in PT. Encouraged to continue to wrap legs. Encouraged to notify PCP regarding right foot redness and swelling. · HF Zones reviewed. · Daily weights and record  · Fluid restriction of 2 Liters per day (64 oz)   · Limit sodium in diet to 3354-0741 mg/day  · Healthier food options were discussed   · Monitor BP daily 1 hour after meds are taken  · Increase activity as tolerated     Patient was instructed to call the [employer] for changes in the following symptoms:    Weight gain of 3 pounds in 1 day or 5 pounds in 1 week   Increased shortness of breath   Shortness of breath while laying down   Cough   Chest pain   Swelling in feet, ankles or legs   Tenderness or bloating in the abdomen   Fatigue    Decreased appetite or feeling \"full\"   Nausea    Confusion      Return in about 3 months (around 12/8/2020). or sooner if needed     Patient given educational materials - see patient instructions. We discussed the importance of weighing oneself and recording daily. We also discussed the importance of a low sodium diet, higher sodium foods to avoid and appropriate low sodium food choices. Discussed use, benefit, and side effects of prescribed medications. All patient questions answered.   Patient verbalizes understanding of plan of care using teach back method, and is agreeable to the treatment plan. Greater then 30 minutes of time was spent reviewing the chart and educating the patient about HF, medications, diet, exercise, and discussing the plan of care. I personally spent more then 50% of the appt time face to face with the patient counseling/coordinating patient's care.       Electronicallysigned by LANE Katz CNP on 9/8/2020 at 10:46 AM

## 2020-09-10 RX ORDER — FLUOXETINE HYDROCHLORIDE 40 MG/1
CAPSULE ORAL
Qty: 90 CAPSULE | Refills: 3 | OUTPATIENT
Start: 2020-09-10

## 2020-09-11 ENCOUNTER — OFFICE VISIT (OUTPATIENT)
Dept: FAMILY MEDICINE CLINIC | Age: 70
End: 2020-09-11
Payer: MEDICARE

## 2020-09-11 VITALS
BODY MASS INDEX: 41.98 KG/M2 | TEMPERATURE: 96.9 F | HEART RATE: 64 BPM | RESPIRATION RATE: 12 BRPM | WEIGHT: 194.6 LBS | SYSTOLIC BLOOD PRESSURE: 104 MMHG | HEIGHT: 57 IN | DIASTOLIC BLOOD PRESSURE: 58 MMHG

## 2020-09-11 PROCEDURE — 3017F COLORECTAL CA SCREEN DOC REV: CPT | Performed by: FAMILY MEDICINE

## 2020-09-11 PROCEDURE — G0439 PPPS, SUBSEQ VISIT: HCPCS | Performed by: FAMILY MEDICINE

## 2020-09-11 PROCEDURE — 1123F ACP DISCUSS/DSCN MKR DOCD: CPT | Performed by: FAMILY MEDICINE

## 2020-09-11 PROCEDURE — 1036F TOBACCO NON-USER: CPT | Performed by: FAMILY MEDICINE

## 2020-09-11 PROCEDURE — 1090F PRES/ABSN URINE INCON ASSESS: CPT | Performed by: FAMILY MEDICINE

## 2020-09-11 PROCEDURE — G8400 PT W/DXA NO RESULTS DOC: HCPCS | Performed by: FAMILY MEDICINE

## 2020-09-11 PROCEDURE — 99213 OFFICE O/P EST LOW 20 MIN: CPT | Performed by: FAMILY MEDICINE

## 2020-09-11 PROCEDURE — 4040F PNEUMOC VAC/ADMIN/RCVD: CPT | Performed by: FAMILY MEDICINE

## 2020-09-11 PROCEDURE — G8427 DOCREV CUR MEDS BY ELIG CLIN: HCPCS | Performed by: FAMILY MEDICINE

## 2020-09-11 PROCEDURE — G8417 CALC BMI ABV UP PARAM F/U: HCPCS | Performed by: FAMILY MEDICINE

## 2020-09-11 RX ORDER — FLUOXETINE HYDROCHLORIDE 40 MG/1
40 CAPSULE ORAL DAILY
Qty: 90 CAPSULE | Refills: 3 | Status: SHIPPED | OUTPATIENT
Start: 2020-09-11 | End: 2021-07-16 | Stop reason: SDUPTHER

## 2020-09-11 RX ORDER — SENNOSIDES 8.6 MG
650 CAPSULE ORAL EVERY 8 HOURS PRN
COMMUNITY

## 2020-09-11 RX ORDER — PREDNISONE 20 MG/1
TABLET ORAL
Qty: 15 TABLET | Refills: 0 | Status: SHIPPED | OUTPATIENT
Start: 2020-09-11 | End: 2020-09-26 | Stop reason: SDUPTHER

## 2020-09-11 ASSESSMENT — ENCOUNTER SYMPTOMS
CONSTIPATION: 0
CHEST TIGHTNESS: 0
BLOOD IN STOOL: 0
COUGH: 0
BACK PAIN: 0
ABDOMINAL PAIN: 0
SHORTNESS OF BREATH: 0
VOMITING: 0
EYE PAIN: 0
DIARRHEA: 0
NAUSEA: 0
RHINORRHEA: 0
SORE THROAT: 0
WHEEZING: 0

## 2020-09-11 ASSESSMENT — PATIENT HEALTH QUESTIONNAIRE - PHQ9
2. FEELING DOWN, DEPRESSED OR HOPELESS: 0
1. LITTLE INTEREST OR PLEASURE IN DOING THINGS: 0
SUM OF ALL RESPONSES TO PHQ QUESTIONS 1-9: 0
SUM OF ALL RESPONSES TO PHQ9 QUESTIONS 1 & 2: 0
SUM OF ALL RESPONSES TO PHQ QUESTIONS 1-9: 0

## 2020-09-11 ASSESSMENT — LIFESTYLE VARIABLES: HOW OFTEN DO YOU HAVE A DRINK CONTAINING ALCOHOL: 0

## 2020-09-11 NOTE — PATIENT INSTRUCTIONS
Personalized Preventive Plan for Eirc Espinosa - 9/11/2020  Medicare offers a range of preventive health benefits. Some of the tests and screenings are paid in full while other may be subject to a deductible, co-insurance, and/or copay. Some of these benefits include a comprehensive review of your medical history including lifestyle, illnesses that may run in your family, and various assessments and screenings as appropriate. After reviewing your medical record and screening and assessments performed today your provider may have ordered immunizations, labs, imaging, and/or referrals for you. A list of these orders (if applicable) as well as your Preventive Care list are included within your After Visit Summary for your review. Other Preventive Recommendations:    · A preventive eye exam performed by an eye specialist is recommended every 1-2 years to screen for glaucoma; cataracts, macular degeneration, and other eye disorders. · A preventive dental visit is recommended every 6 months. · Try to get at least 150 minutes of exercise per week or 10,000 steps per day on a pedometer . · Order or download the FREE \"Exercise & Physical Activity: Your Everyday Guide\" from [employer]. Call 9-803.458.6848 or search [employer] online. · You need 0770-1301 mg of calcium and 1831-0642 IU of vitamin D per day. It is possible to meet your calcium requirement with diet alone, but a vitamin D supplement is usually necessary to meet this goal.  · When exposed to the sun, use a sunscreen that protects against both UVA and UVB radiation with an SPF of 30 or greater. Reapply every 2 to 3 hours or after sweating, drying off with a towel, or swimming. · Always wear a seat belt when traveling in a car. Always wear a helmet when riding a bicycle or motorcycle.   Patient Education        Well Visit, Over 72: Care Instructions  Your Care Instructions     Physical exams can help you stay healthy. Your doctor has checked your overall health and may have suggested ways to take good care of yourself. He or she also may have recommended tests. At home, you can help prevent illness with healthy eating, regular exercise, and other steps. Follow-up care is a key part of your treatment and safety. Be sure to make and go to all appointments, and call your doctor if you are having problems. It's also a good idea to know your test results and keep a list of the medicines you take. How can you care for yourself at home? Reach and stay at a healthy weight. This will lower your risk for many problems, such as obesity, diabetes, heart disease, and high blood pressure. Get at least 30 minutes of exercise on most days of the week. Walking is a good choice. You also may want to do other activities, such as running, swimming, cycling, or playing tennis or team sports. Do not smoke. Smoking can make health problems worse. If you need help quitting, talk to your doctor about stop-smoking programs and medicines. These can increase your chances of quitting for good. Protect your skin from too much sun. When you're outdoors from 10 a.m. to 4 p.m., stay in the shade or cover up with clothing and a hat with a wide brim. Wear sunglasses that block UV rays. Even when it's cloudy, put broad-spectrum sunscreen (SPF 30 or higher) on any exposed skin. See a dentist one or two times a year for checkups and to have your teeth cleaned. Wear a seat belt in the car. Follow your doctor's advice about when to have certain tests. These tests can spot problems early. For men and women  Cholesterol. Your doctor will tell you how often to have this done based on your overall health and other things that can increase your risk for heart attack and stroke. Blood pressure. Have your blood pressure checked during a routine doctor visit.  Your doctor will tell you how often to check your blood pressure based on your age, your blood pressure results, and other factors. Diabetes. Ask your doctor whether you should have tests for diabetes. Vision. Experts recommend that you have yearly exams for glaucoma and other age-related eye problems. Hearing. Tell your doctor if you notice any change in your hearing. You can have tests to find out how well you hear. Colon cancer tests. Keep having colon cancer tests as your doctor recommends. You can have one of several types of tests. Heart attack and stroke risk. At least every 4 to 6 years, you should have your risk for heart attack and stroke assessed. Your doctor uses factors such as your age, blood pressure, cholesterol, and whether you smoke or have diabetes to show what your risk for a heart attack or stroke is over the next 10 years. Osteoporosis. Talk to your doctor about whether you should have a bone density test to find out whether you have thinning bones. Ask your doctor if you need to take a calcium plus vitamin D supplement. You may be able to get enough calcium and vitamin D through your diet. For women  Pap test and pelvic exam. You may no longer need a Pap test. Talk with your doctor about whether to stop or continue to have Pap tests. Breast exam and mammogram. Ask how often you should have a mammogram, which is an X-ray of your breasts. A mammogram can spot breast cancer before it can be felt and when it is easiest to treat. Thyroid disease. Talk to your doctor about whether to have your thyroid checked as part of a regular physical exam. Women have an increased chance of a thyroid problem. For men  Prostate exam. Talk to your doctor about whether you should have a blood test (called a PSA test) for prostate cancer. Experts recommend that you discuss the benefits and risks of the test with your doctor before you decide whether to have this test. Some experts say that men ages 79 and older no longer need testing. Abdominal aortic aneurysm.  Ask your doctor whether you should have a test to check for an aneurysm. You may need a test if you ever smoked or if your parent, brother, sister, or child has had an aneurysm. When should you call for help? Watch closely for changes in your health, and be sure to contact your doctor if you have any problems or symptoms that concern you. Where can you learn more? Go to https://chpepiceweb.[email or web address] org and sign in to your [employer] account. Enter J275 in the [employer] box to learn more about \"Well Visit, Over 65: Care Instructions. \"     If you do not have an account, please click on the \"Sign Up Now\" link. Current as of: August 22, 2019               Content Version: 12.5  © 7464-8327 Healthwise, Incorporated. Care instructions adapted under license by [facility] ([facility]). If you have questions about a medical condition or this instruction, always ask your healthcare professional. [address] any warranty or liability for your use of this information. Patient Education        Heart Failure: Care Instructions  Your Care Instructions     Heart failure occurs when your heart does not pump as much blood as the body needs. Failure does not mean that the heart has stopped pumping but rather that it is not pumping as well as it should. Over time, this causes fluid buildup in your lungs and other parts of your body. Fluid buildup can cause shortness of breath, fatigue, swollen ankles, and other problems. By taking medicines regularly, reducing sodium (salt) in your diet, checking your weight every day, and making lifestyle changes, you can feel better and live longer. Follow-up care is a key part of your treatment and safety. Be sure to make and go to all appointments, and call your doctor if you are having problems. It's also a good idea to know your test results and keep a list of the medicines you take. How can you care for yourself at home? Medicines  Be safe with medicines.  Take your medicines exactly as prescribed. Call your doctor if you think you are having a problem with your medicine. Do not take any vitamins, over-the-counter medicine, or herbal products without talking to your doctor first. Shira Dye not take ibuprofen (Advil or Motrin) and naproxen (Aleve) without talking to your doctor first. They could make your heart failure worse. You may take some of the following medicine. Angiotensin-converting enzyme inhibitors (ACEIs) or angiotensin II receptor blockers (ARBs) reduce the heart's workload, lower blood pressure, and reduce swelling. Taking an ACEI or ARB may lower your chance of needing to be hospitalized. Beta-blockers can slow heart rate, decrease blood pressure, and improve your condition. Taking a beta-blocker may lower your chance of needing to be hospitalized. Diuretics, also called water pills, reduce swelling. You will get more details on the specific medicines your doctor prescribes. Diet  Your doctor may suggest that you limit sodium. Your doctor can tell you how much sodium is right for you. An example is less than 3,000 mg a day. This includes all the salt you eat in cooking or in packaged foods. People get most of their sodium from processed foods. Fast food and restaurant meals also tend to be very high in sodium. Ask your doctor how much liquid you can drink each day. You may have to limit liquids. Weight  Weigh yourself without clothing at the same time each day. Record your weight. Call your doctor if you have a sudden weight gain, such as more than 2 to 3 pounds in a day or 5 pounds in a week. (Your doctor may suggest a different range of weight gain.) A sudden weight gain may mean that your heart failure is getting worse. Activity level  Start light exercise (if your doctor says it is okay). Even if you can only do a small amount, exercise will help you get stronger, have more energy, and manage your weight and your stress. Walking is an easy way to get exercise.  Start your health, and be sure to contact your doctor if you develop new symptoms. Where can you learn more? Go to https://chpepiceweb.[employer]. org and sign in to your [employer] account. Enter N386 in the [facility] box to learn more about \"Heart Failure: Care Instructions. \"     If you do not have an account, please click on the \"Sign Up Now\" link. Current as of: December 16, 2019               Content Version: 12.5  © 0281-3002 [employer]. Care instructions adapted under license by [facility]"[employer]" [facility] ([facility]). If you have questions about a medical condition or this instruction, always ask your healthcare professional. [place] [address] any warranty or liability for your use of this information. Patient Education        Gout: Care Instructions  Your Care Instructions     Gout is a form of arthritis caused by a buildup of uric acid crystals in a joint. It causes sudden attacks of pain, swelling, redness, and stiffness, usually in one joint, especially the big toe. Gout usually comes on without a cause. But it can be brought on by drinking alcohol (especially beer) or eating seafood and red meat. Taking certain medicines, such as diuretics or aspirin, also can bring on an attack of gout. Taking your medicines as prescribed and following up with your doctor regularly can help you avoid gout attacks in the future. Follow-up care is a key part of your treatment and safety. Be sure to make and go to all appointments, and call your doctor if you are having problems. It's also a good idea to know your test results and keep a list of the medicines you take. How can you care for yourself at home? If the joint is swollen, put ice or a cold pack on the area for 10 to 20 minutes at a time. Put a thin cloth between the ice and your skin. Prop up the sore limb on a pillow when you ice it or anytime you sit or lie down during the next 3 days.  Try to keep it above the level of your heart. This will help reduce swelling. Rest sore joints. Avoid activities that put weight or strain on the joints for a few days. Take short rest breaks from your regular activities during the day. Take your medicines exactly as prescribed. Call your doctor if you think you are having a problem with your medicine. Take pain medicines exactly as directed. If the doctor gave you a prescription medicine for pain, take it as prescribed. If you are not taking a prescription pain medicine, ask your doctor if you can take an over-the-counter medicine. Eat less seafood and red meat. Check with your doctor before drinking alcohol. Losing weight, if you are overweight, may help reduce attacks of gout. But do not go on a [employer]. \" Losing a lot of weight in a short amount of time can cause a gout attack. When should you call for help? Call your doctor now or seek immediate medical care if:  You have a fever. The joint is so painful you cannot use it. You have sudden, unexplained swelling, redness, warmth, or severe pain in one or more joints. Watch closely for changes in your health, and be sure to contact your doctor if:  You have joint pain. Your symptoms get worse or are not improving after 2 or 3 days. Where can you learn more? Go to https://Offers.com.[employer]. org and sign in to your [employer] account. Enter X109 in the [employer] box to learn more about \"Gout: Care Instructions. \"     If you do not have an account, please click on the \"Sign Up Now\" link. Current as of: December 9, 2019               Content Version: 12.5  © 0766-4989 Healthwise, Incorporated. Care instructions adapted under license by [facility] ([facility]). If you have questions about a medical condition or this instruction, always ask your healthcare professional. [place] [address] any warranty or liability for your use of this information.

## 2020-09-11 NOTE — PROGRESS NOTES
Subjective:      Patient ID: Kavin Bradley is a 81 y.o. female. HPI  Pt here for a check up. Reviewed BMI of 42. Encouraged diet, exercise and weight loss. Reviewed health maintenance, interested in FIT. Right foot pain and swelling. Weight is increased. Sees CHF clinic. Single, non smoker, pmh reviewed. History of gout. Review of Systems   Constitutional: Negative for chills, fatigue, fever and unexpected weight change. HENT: Negative for congestion, ear pain, rhinorrhea and sore throat. Eyes: Negative for pain and visual disturbance. Respiratory: Negative for cough, chest tightness, shortness of breath and wheezing. Cardiovascular: Positive for leg swelling. Negative for chest pain and palpitations. Gastrointestinal: Negative for abdominal pain, blood in stool, constipation, diarrhea, nausea and vomiting. Genitourinary: Negative for difficulty urinating, frequency, hematuria and urgency. Musculoskeletal: Negative for back pain, joint swelling, myalgias and neck pain. Foot pain   Skin: Negative for rash. Neurological: Negative for dizziness and headaches. Hematological: Negative for adenopathy. Does not bruise/bleed easily. Psychiatric/Behavioral: Negative for behavioral problems and sleep disturbance. The patient is not nervous/anxious. Objective:   Physical Exam  Vitals signs and nursing note reviewed. Constitutional:       Appearance: She is well-developed. HENT:      Head: Normocephalic and atraumatic. Right Ear: External ear normal.      Left Ear: External ear normal.      Nose: Nose normal.      Mouth/Throat:      Mouth: Mucous membranes are moist.   Eyes:      Pupils: Pupils are equal, round, and reactive to light. Neck:      Musculoskeletal: Neck supple. Thyroid: No thyromegaly. Cardiovascular:      Rate and Rhythm: Normal rate and regular rhythm. Heart sounds: Normal heart sounds. Pulmonary:      Breath sounds: Normal breath sounds. No wheezing or rales. Abdominal:      General: Bowel sounds are normal.      Palpations: Abdomen is soft. Tenderness: There is no abdominal tenderness. There is no guarding or rebound. Musculoskeletal: Normal range of motion. Right lower leg: Edema present. Left lower leg: Edema present. Legs:    Lymphadenopathy:      Cervical: No cervical adenopathy. Skin:     General: Skin is warm and dry. Findings: No rash. Neurological:      Mental Status: She is alert and oriented to person, place, and time. Cranial Nerves: No cranial nerve deficit. Deep Tendon Reflexes: Reflexes are normal and symmetric. Health Maintenance   Topic Date Due    DTaP/Tdap/Td vaccine (1 - Tdap) 09/15/1971    Diabetes screen  09/15/1992    Shingles Vaccine (1 of 2) 09/15/2002    Colon cancer screen colonoscopy  09/15/2002    DEXA (modify frequency per FRAX score)  09/15/2007    Annual Wellness Visit (AWV)  2019    Flu vaccine (1) 2020    Potassium monitoring  2021    Creatinine monitoring  2021    Breast cancer screen  2022    Lipid screen  2024    Pneumococcal 65+ years Vaccine  Completed    Hepatitis C screen  Completed    Hepatitis A vaccine  Aged Out    Hepatitis B vaccine  Aged Out    Hib vaccine  Aged Out    Meningococcal (ACWY) vaccine  Aged Out         Assessment:      Acute on chronic chf  Gout      Plan:      Increase bumex x 2 days. Monitor weights  prednisone 20 mg BID/QD  Encouraged diet, exercise and weight loss. Dash diet  Reviewed health maintenance  FIT test        Jenny Kiser MD   Medicare Annual Wellness Visit  Name: Veda Escalante Date: 2020   MRN: [id number] Sex: Female   Age: 81 y.o.  Ethnicity: Non-/Non    : 1950 Race: Georges Stage is here for Medicare AWV    Screenings for behavioral, psychosocial and functional/safety risks, and cognitive dysfunction are all negative except as indicated below. These results, as well as other patient data from the [address] form, are documented in Flowsheets linked to this Encounter. No Known Allergies    Prior to Visit Medications    Medication Sig Taking?  Authorizing Provider   acetaminophen (TYLENOL) 650 MG extended release tablet Take 650 mg by mouth every 8 hours as needed for Pain Yes Historical Provider, MD   sodium chloride 1 g tablet Take 1 tablet by mouth 2 times daily Yes LANE Alarcon CNP   potassium chloride (KLOR-CON M) 10 MEQ extended release tablet Take 1 tablet by mouth daily Yes LANE Alarcon CNP   bumetanide (BUMEX) 1 MG tablet Take 1 tablet by mouth 2 times daily Yes Wendy Haynes MD   Riociguat (ADEMPAS) 2.5 MG TABS Take 2.5 mg by mouth 3 times daily Yes Historical Provider, MD   docusate sodium (COLACE) 100 MG capsule Take 100 mg by mouth as needed  Yes Historical Provider, MD   metoprolol tartrate (LOPRESSOR) 25 MG tablet Take 0.5 tablets by mouth 2 times daily Yes Alban Mohr PA-C   FLUoxetine (PROZAC) 40 MG capsule Take 1 capsule by mouth daily Yes Sanjay Monk MD   aspirin 81 MG tablet Take 81 mg by mouth daily  Yes Historical Provider, MD   Multiple Vitamins-Minerals (MULTI-VITAMIN GUMMIES PO) Take 2 tablets by mouth daily  Yes Historical Provider, MD       Past Medical History:   Diagnosis Date    Depression     Gout attack     Hypertension     Osteoarthritis     Pulmonary artery hypertension ([facility] [address]     Thrombocytopenia (HCC)        Past Surgical History:   Procedure Laterality Date    APPENDECTOMY      FACIAL NERVE SURGERY      1989       Family History   Problem Relation Age of Onset    Diabetes Father    Marie Miller Arthritis Mother     COPD Mother     Diabetes Sister     Heart Disease Maternal Uncle     Breast Cancer Niece 36    Sleep Apnea Brother     Asthma Neg Hx     Birth Defects Neg Hx     Cancer Neg Hx     Depression Neg Hx     Early Death Neg Hx     Hearing Loss Neg Hx     High Blood Pressure Neg Hx     High Cholesterol Neg Hx     Kidney Disease Neg Hx     Learning Disabilities Neg Hx     Mental Illness Neg Hx     Mental Retardation Neg Hx     Miscarriages / Stillbirths Neg Hx     Stroke Neg Hx     Substance Abuse Neg Hx     Vision Loss Neg Hx     Other Neg Hx        CareTeam (Including outside providers/suppliers regularly involved in providing care):   Patient Care Team:  Erick Ford MD as PCP - General (Family Medicine)  Erick Ford MD as PCP - [facility] Empaneled Provider  Karime Hagen MD as Consulting Physician (Interventional Cardiology)  Mary Beth Andrew MD as Consulting Physician (Nephrology)  Rachel Torrez DO (Pulmonary Disease)    Wt Readings from Last 3 Encounters:   09/11/20 194 lb 9.6 oz (88.3 kg)   09/08/20 189 lb (85.7 kg)   07/09/20 202 lb 9.6 oz (91.9 kg)     Vitals:    09/11/20 1035   BP: (!) 104/58   Pulse: 64   Resp: 12   Temp: 96.9 °F (36.1 °C)   TempSrc: Infrared   Weight: 194 lb 9.6 oz (88.3 kg)   Height: 4' 9\" (1.448 m)     Body mass index is 42.11 kg/m². Based upon direct observation of the patient, evaluation of cognition reveals recent and remote memory intact. Patient's complete Health Risk Assessment and screening values have been reviewed and are found in Flowsheets. The following problems were reviewed today and where indicated follow up appointments were made and/or referrals ordered. Positive Risk Factor Screenings with Interventions:     General Health:  General  In general, how would you say your health is?: Good  In the past 7 days, have you experienced any of the following?  New or Increased Pain, New or Increased Fatigue, Loneliness, Social Isolation, Stress or Anger?: (!) New or Increased Pain  Do you get the social and emotional support that you need?: Yes  Do you have a Living Will?: (!) No  General Health Risk Interventions:  · Pain issues: chf flare , possible gout, being Afluria) 09/17/2019    Pneumococcal Conjugate 13-valent ([id number]) 10/11/2017    Pneumococcal Polysaccharide ([id number]) 06/18/2019        Health Maintenance   Topic Date Due    DTaP/Tdap/Td vaccine (1 - Tdap) 09/15/1971    Diabetes screen  09/15/1992    Shingles Vaccine (1 of 2) 09/15/2002    Colon cancer screen colonoscopy  09/15/2002    DEXA (modify frequency per FRAX score)  09/15/2007    Annual Wellness Visit (AWV)  06/18/2019    Flu vaccine (1) 09/01/2020    Potassium monitoring  08/24/2021    Creatinine monitoring  08/24/2021    Breast cancer screen  07/17/2022    Lipid screen  06/18/2024    Pneumococcal 65+ years Vaccine  Completed    Hepatitis C screen  Completed    Hepatitis A vaccine  Aged Out    Hepatitis B vaccine  Aged Out    Hib vaccine  Aged Out    Meningococcal (ACWY) vaccine  Aged Out     Recommendations for [employer] Due: see orders and patient instructions/AVS.  . Recommended screening schedule for the next 5-10 years is provided to the patient in written form: see Patient Instructions/AVS.    There are no diagnoses linked to this encounter. 1. Routine general medical examination at a health care facility      2. Acute on chronic combined systolic and diastolic CHF (congestive heart failure) (HCC)  - increase bumex x 2 days, monitor weights, follow up with chf clinic    3. Moderate episode of recurrent major depressive disorder (HCC)  -refill prozac, stable.       4. Acute renal failure superimposed on stage 4 chronic kidney disease, unspecified acute renal failure type [facility])  Lab Results   Component Value Date     08/24/2020    K 4.4 08/24/2020     08/24/2020    CO2 28 08/24/2020    BUN 33 (H) 08/24/2020    CREATININE 0.9 08/24/2020    GLUCOSE 114 (H) 08/24/2020    CALCIUM 9.5 08/24/2020    PROT 8.2 (H) 03/06/2020    LABALBU 3.4 (L) 03/06/2020    BILITOT 0.7 03/06/2020    ALKPHOS 110 03/06/2020    AST 17 03/06/2020    ALT 11 03/06/2020    LABGLOM 62 (A) 08/24/2020   stable      5. Thrombocytopenia ([facility] [address]  Lab Results   Component Value Date    WBC 7.1 03/06/2020    HGB 11.0 (L) 03/06/2020    HCT 37.3 03/06/2020    MCV 98.9 03/06/2020     03/06/2020     stable    6. Acute idiopathic gout of foot, unspecified laterality    - predniSONE (DELTASONE) 20 MG tablet; Take 1 tab BID x 5 days, then 1 tab daily x 5 days. Take with food. Dispense: 15 tablet;  Refill: 0

## 2020-09-14 ENCOUNTER — TELEPHONE (OUTPATIENT)
Dept: CARDIOLOGY CLINIC | Age: 70
End: 2020-09-14

## 2020-09-14 NOTE — TELEPHONE ENCOUNTER
Sister called in  [place] gain Fri 191 today 193  Has some swelling in bilateral feet that \"indents\" when she presses on it. It is a little worse than when she saw her PCP on Fri. She was started on Prednisone for gout.

## 2020-09-14 NOTE — TELEPHONE ENCOUNTER
Please have her take an extra Bumex 1 mg x 2 days with an extra Potassium 10 mEq x 2 days and call on Wed with an update

## 2020-09-26 ENCOUNTER — TELEPHONE (OUTPATIENT)
Dept: FAMILY MEDICINE CLINIC | Age: 70
End: 2020-09-26

## 2020-09-26 RX ORDER — PREDNISONE 20 MG/1
TABLET ORAL
Qty: 15 TABLET | Refills: 0 | Status: SHIPPED | OUTPATIENT
Start: 2020-09-26 | End: 2020-10-09 | Stop reason: SDUPTHER

## 2020-09-26 NOTE — TELEPHONE ENCOUNTER
ON CALL NOTE- 0- Pt sister (Eleazar Cordoba contact) called the on call line stating that about 2 weeks ago pt had gout on her right foot and Dr Britni Javed prescribed prednisone, which helped. Today pt has pain and swelling on her left foot. They would like a prescription sent. Prescription sent to pharmacy. Call office with any questions or concerns, or if symptoms are getting worse or changing. Orders Placed This Encounter   Medications    predniSONE (DELTASONE) 20 MG tablet     Sig: Take 1 tab BID x 5 days, then 1 tab daily x 5 days. Take with food.      Dispense:  15 tablet     Refill:  0     Electronically signed by LANE De Santiago CNP on 9/26/2020 at 10:57 AM

## 2020-10-09 ENCOUNTER — TELEPHONE (OUTPATIENT)
Dept: FAMILY MEDICINE CLINIC | Age: 70
End: 2020-10-09

## 2020-10-09 RX ORDER — ALLOPURINOL 300 MG/1
300 TABLET ORAL DAILY
Qty: 90 TABLET | Refills: 0 | Status: SHIPPED | OUTPATIENT
Start: 2020-10-09 | End: 2020-12-18 | Stop reason: SDUPTHER

## 2020-10-09 RX ORDER — PREDNISONE 20 MG/1
TABLET ORAL
Qty: 15 TABLET | Refills: 0 | Status: SHIPPED | OUTPATIENT
Start: 2020-10-09 | End: 2020-12-22

## 2020-10-09 NOTE — TELEPHONE ENCOUNTER
ep'ed prednisone to pharm requested    She needs to go on allopurinol to lower uric acid in her body. She can't keep taking all this prednisone.

## 2020-10-09 NOTE — TELEPHONE ENCOUNTER
Patient's sister Kavya Shows called. She states that the patient is complaining of gout in her hand again. Do you want to give her another round of Prednisone or what do you recommend? She uses [facility] in [place].   Call Michael Matias back at [phone]

## 2020-10-09 NOTE — TELEPHONE ENCOUNTER
Samir notified and they are agreeable to starting allopurinol -- send to [facility]. They were notified to NOT start the allopurinol until after the gout flair.

## 2020-10-12 NOTE — TELEPHONE ENCOUNTER
Spoke with Ba Hidalgo and states she visited [place] over the wknd and she was \"just fine. She wasn't having any troubles. \"  They will hold off on the prednisone but asked what to do about the Allopurinol? Uric acid level was 12.4 - 3/2020 -- advised to take allopurinol, repeat uric acid in 1 month. Agreed?

## 2020-10-29 ENCOUNTER — TELEPHONE (OUTPATIENT)
Dept: CARDIOLOGY CLINIC | Age: 70
End: 2020-10-29

## 2020-10-29 NOTE — TELEPHONE ENCOUNTER
Please have her take an extra Bumex 2 mg x 2 days (in addition to her regular dose of 1 mg bid) plus an extra Potassium 10 mEq x 2 days  Call Monday with update

## 2020-10-29 NOTE — TELEPHONE ENCOUNTER
Patient sister called in   Wt up 185 yesterday and 190 today  States feet are puffy  Denies SOB, cough, abd bloating

## 2020-11-02 NOTE — TELEPHONE ENCOUNTER
Patient's sister Nely Pantoja called back with update this AM.    Patient's weight is currently 188 lbs, /61, HR 85. Nely Pantoja stated patient's feet are no longer puffy, no abd bloating, no worsening SOB. Patient has been wrapping legs or using RICHARD hose. Nely Pantoja states patient reports feeling better than previous. Nely Pantoja wanted to make you aware that Dr. Karl Powell started patient on Allopurinol 300mg QD for gout in her hand. Nely Pantoja stated patient has had reduced snacking due to not having much of an appetite.

## 2020-12-08 ENCOUNTER — HOSPITAL ENCOUNTER (OUTPATIENT)
Dept: NON INVASIVE DIAGNOSTICS | Age: 70
Discharge: HOME | End: 2020-12-08
Payer: MEDICARE

## 2020-12-08 LAB
LV EF: 58 %
LVEF MODALITY: NORMAL

## 2020-12-08 PROCEDURE — 93306 TTE W/DOPPLER COMPLETE: CPT

## 2020-12-11 ENCOUNTER — HOSPITAL ENCOUNTER (OUTPATIENT)
Age: 70
Discharge: HOME | End: 2020-12-11
Payer: MEDICARE

## 2020-12-11 DIAGNOSIS — E87.1 HYPONATREMIA: ICD-10-CM

## 2020-12-11 DIAGNOSIS — I10 ESSENTIAL HYPERTENSION: ICD-10-CM

## 2020-12-11 DIAGNOSIS — E87.79 OTHER FLUID OVERLOAD: ICD-10-CM

## 2020-12-11 DIAGNOSIS — N18.31 STAGE 3A CHRONIC KIDNEY DISEASE (HCC): ICD-10-CM

## 2020-12-11 DIAGNOSIS — N18.30 CHRONIC KIDNEY DISEASE, STAGE III (MODERATE) (HCC): ICD-10-CM

## 2020-12-11 DIAGNOSIS — E79.0 ELEVATED BLOOD URIC ACID LEVEL: ICD-10-CM

## 2020-12-11 LAB
ANION GAP SERPL CALCULATED.3IONS-SCNC: 12 MEQ/L (ref 8–16)
BACTERIA: ABNORMAL
BILIRUBIN URINE: NEGATIVE
BLOOD, URINE: ABNORMAL
BUN BLDV-MCNC: 29 MG/DL (ref 7–22)
CALCIUM SERPL-MCNC: 9.6 MG/DL (ref 8.5–10.5)
CASTS: ABNORMAL /LPF
CHARACTER, URINE: ABNORMAL
CHLORIDE BLD-SCNC: 97 MEQ/L (ref 98–111)
CO2: 30 MEQ/L (ref 23–33)
COLOR: YELLOW
CREAT SERPL-MCNC: 1.1 MG/DL (ref 0.4–1.2)
CREATININE URINE: 65 MG/DL
CREATININE, URINE: 65 MG/DL
CRYSTALS: ABNORMAL
EPITHELIAL CELLS, UA: ABNORMAL /HPF
GFR SERPL CREATININE-BSD FRML MDRD: 49 ML/MIN/1.73M2
GLUCOSE BLD-MCNC: 104 MG/DL (ref 70–108)
GLUCOSE, URINE: NEGATIVE MG/DL
KETONES, URINE: NEGATIVE
LEUKOCYTE ESTERASE, URINE: ABNORMAL
MICROALBUMIN UR-MCNC: 35.11 MG/DL
MICROALBUMIN/CREAT UR-RTO: 540 MG/G (ref 0–30)
NITRITE, URINE: NEGATIVE
PH UA: 6 (ref 5–9)
POTASSIUM SERPL-SCNC: 3.8 MEQ/L (ref 3.5–5.2)
PROTEIN UA: 100 MG/DL
PROTEIN, URINE: 91.6 MG/DL
RBC URINE: ABNORMAL /HPF
SODIUM BLD-SCNC: 139 MEQ/L (ref 135–145)
SPECIFIC GRAVITY UA: 1.01 (ref 1–1.03)
URIC ACID: 4.9 MG/DL (ref 2.4–5.7)
UROBILINOGEN, URINE: 0.2 EU/DL (ref 0–1)
WBC UA: > 100 /HPF

## 2020-12-11 PROCEDURE — 36415 COLL VENOUS BLD VENIPUNCTURE: CPT

## 2020-12-11 PROCEDURE — 82570 ASSAY OF URINE CREATININE: CPT

## 2020-12-11 PROCEDURE — 81001 URINALYSIS AUTO W/SCOPE: CPT

## 2020-12-11 PROCEDURE — 84550 ASSAY OF BLOOD/URIC ACID: CPT

## 2020-12-11 PROCEDURE — 84156 ASSAY OF PROTEIN URINE: CPT

## 2020-12-11 PROCEDURE — 82043 UR ALBUMIN QUANTITATIVE: CPT

## 2020-12-11 PROCEDURE — 80048 BASIC METABOLIC PNL TOTAL CA: CPT

## 2020-12-14 RX ORDER — BUMETANIDE 1 MG/1
1 TABLET ORAL 2 TIMES DAILY
Qty: 60 TABLET | Refills: 11 | Status: SHIPPED | OUTPATIENT
Start: 2020-12-14 | End: 2021-05-16

## 2020-12-17 ENCOUNTER — TELEPHONE (OUTPATIENT)
Dept: FAMILY MEDICINE CLINIC | Age: 70
End: 2020-12-17

## 2020-12-17 NOTE — TELEPHONE ENCOUNTER
Rupert Hill notified and verbalized understanding    Has about 2 weeks remaining of the allopurinol, requesting refill to [facility]

## 2020-12-17 NOTE — TELEPHONE ENCOUNTER
Patient's sister Nely Pantoja called. She says that she had her blood drawn on Friday that included the Uric Acid that you ordered for her. She would like the results. It looks like they put all of the orders under [facility] since she had BW drawn for him too. Can you take a look at the results and call Nely Pantoja back at [phone]?

## 2020-12-18 RX ORDER — ALLOPURINOL 300 MG/1
300 TABLET ORAL DAILY
Qty: 90 TABLET | Refills: 2 | Status: SHIPPED | OUTPATIENT
Start: 2020-12-18 | End: 2021-07-16 | Stop reason: SDUPTHER

## 2020-12-22 ENCOUNTER — VIRTUAL VISIT (OUTPATIENT)
Dept: NEPHROLOGY | Age: 70
End: 2020-12-22
Payer: MEDICARE

## 2020-12-22 PROCEDURE — 4040F PNEUMOC VAC/ADMIN/RCVD: CPT | Performed by: INTERNAL MEDICINE

## 2020-12-22 PROCEDURE — 1090F PRES/ABSN URINE INCON ASSESS: CPT | Performed by: INTERNAL MEDICINE

## 2020-12-22 PROCEDURE — G8400 PT W/DXA NO RESULTS DOC: HCPCS | Performed by: INTERNAL MEDICINE

## 2020-12-22 PROCEDURE — G8427 DOCREV CUR MEDS BY ELIG CLIN: HCPCS | Performed by: INTERNAL MEDICINE

## 2020-12-22 PROCEDURE — 1123F ACP DISCUSS/DSCN MKR DOCD: CPT | Performed by: INTERNAL MEDICINE

## 2020-12-22 PROCEDURE — 3017F COLORECTAL CA SCREEN DOC REV: CPT | Performed by: INTERNAL MEDICINE

## 2020-12-22 PROCEDURE — 99213 OFFICE O/P EST LOW 20 MIN: CPT | Performed by: INTERNAL MEDICINE

## 2020-12-22 RX ORDER — POTASSIUM CHLORIDE 750 MG/1
10 TABLET, EXTENDED RELEASE ORAL DAILY
Qty: 90 TABLET | Refills: 2 | Status: SHIPPED | OUTPATIENT
Start: 2020-12-22 | End: 2021-01-12 | Stop reason: SDUPTHER

## 2020-12-22 RX ORDER — SODIUM CHLORIDE 1000 MG
1 TABLET, SOLUBLE MISCELLANEOUS 2 TIMES DAILY
Qty: 120 TABLET | Refills: 3 | Status: SHIPPED | OUTPATIENT
Start: 2020-12-22 | End: 2021-08-03 | Stop reason: SDUPTHER

## 2020-12-22 NOTE — PROGRESS NOTES
 metoprolol tartrate (LOPRESSOR) 25 MG tablet Take 0.5 tablets by mouth 2 times daily 90 tablet 1    acetaminophen (TYLENOL) 650 MG extended release tablet Take 650 mg by mouth every 8 hours as needed for Pain      FLUoxetine (PROZAC) 40 MG capsule Take 1 capsule by mouth daily 90 capsule 3    Riociguat (ADEMPAS) 2.5 MG TABS Take 2.5 mg by mouth 3 times daily      docusate sodium (COLACE) 100 MG capsule Take 100 mg by mouth as needed       aspirin 81 MG tablet Take 81 mg by mouth daily       Multiple Vitamins-Minerals (MULTI-VITAMIN GUMMIES PO) Take 2 tablets by mouth daily          Vitals     There were no vitals taken for this visit. Wt Readings from Last 3 Encounters:   09/11/20 194 lb 9.6 oz (88.3 kg)   09/08/20 189 lb (85.7 kg)   07/09/20 202 lb 9.6 oz (91.9 kg)        Physical Exam     General -- no distress  Oral Mucosa -- moist  Neck --  JVD - no  Extremities -- no edema, moves all extremeties  CNS - awake and alert   Pschy - not agitated, mood and memory normal    Due to this being a TeleHealth encounter, evaluation of the following organ systems is limited: Vitals/EENT/Resp/CV/GI//MS/Neuro/Skin/Heme-Lymph-Imm. Labs, Radiology and Tests    Labs -    10/19 12/20          Sodium 125 139          Potassium 4.2 3.8          BUN 25 29          Creatinine 0.7 1.1          eGFR 83 49                      UPCR 1.18           UMCR 338                         Renal Ultrasound Scan -- 10/19  Right kidney 9.7 cm left kidney 11.9 cm  1.5 cm cyst on the right kidney  Left renal left renal calculi     Assessment    1. Renal -patient has CKD stage III most likely. Has some fluctuations in the creatinine. However it certainly appears to be stabilizing around 1.1. A year ago it was close to 0.7  -Volume status much better so we will decrease the Bumex to 1 mg daily and advised to call us if she starts to have swelling we will increase it at that time  2.  Electrolytes-hyponatremia improved well on salt tabs can continue for now  3. Essential hypertension-running slightly lower follow closely  4. History of congestive heart failure plan as mentioned above  5. Proteinuria unclear etiology plan as mentioned above  6. meds reviewed and D/W patient and sister  7. FU in 6 months    Tests and orders placed this Encounter     Orders Placed This Encounter   Procedures    Comprehensive Metabolic Panel    BUN    Creatinine, Serum    Potassium    MICROALBUMIN, RANDOM URINE (W/O CREATININE)    Protein, urine, random    Creatinine, Random Urine    Urinalysis with Microscopic       Zach Davis M.D  [facility].

## 2021-01-04 ENCOUNTER — TELEPHONE (OUTPATIENT)
Dept: NEPHROLOGY | Age: 71
End: 2021-01-04

## 2021-01-04 ENCOUNTER — TELEPHONE (OUTPATIENT)
Dept: CARDIOLOGY CLINIC | Age: 71
End: 2021-01-04

## 2021-01-04 NOTE — TELEPHONE ENCOUNTER
Please have her call Dr Emy Retana since he made the change  I will follow her but would like him to know since he decreased it  Thanks

## 2021-01-04 NOTE — TELEPHONE ENCOUNTER
Pt's sister called and states since the decrease of bumex to 1 mg daily, pt is complaining of increased swelling and shortness of breath. What do you want to do?

## 2021-01-04 NOTE — TELEPHONE ENCOUNTER
Sister Pallavi Morgan called in   Patient saw Oliverio Stephens on 12/22 and bumex was decreased to 1 mg daily    Sister has noticed increase in SOB and cough more at night since decrease in bumex. She stated she listened to her lungs and were wheezy on expiration. SPO2 was 89/90 but came up to 95%.  Wt stable 194 only slight pitting HIPOLITO

## 2021-01-11 ENCOUNTER — TELEPHONE (OUTPATIENT)
Dept: CARDIOLOGY CLINIC | Age: 71
End: 2021-01-11

## 2021-01-11 NOTE — TELEPHONE ENCOUNTER
Medications, demographics and insurance has been reviewed and is up to date for VV scheduled with Cielo on 1/12/21 at 845. Patient was asked to obtain a least 3 out of 5 of the listed vital signs: BP, HR, Temp, Ht and/or Wt and have ready for the provider. Please send link to [phone].

## 2021-01-12 ENCOUNTER — VIRTUAL VISIT (OUTPATIENT)
Dept: CARDIOLOGY CLINIC | Age: 71
End: 2021-01-12
Payer: MEDICARE

## 2021-01-12 DIAGNOSIS — I10 ESSENTIAL HYPERTENSION: ICD-10-CM

## 2021-01-12 DIAGNOSIS — I27.20 PULMONARY HTN (HCC): ICD-10-CM

## 2021-01-12 DIAGNOSIS — I50.32 CHF (CONGESTIVE HEART FAILURE), NYHA CLASS II, CHRONIC, DIASTOLIC (HCC): Primary | ICD-10-CM

## 2021-01-12 PROCEDURE — 1090F PRES/ABSN URINE INCON ASSESS: CPT | Performed by: NURSE PRACTITIONER

## 2021-01-12 PROCEDURE — 1123F ACP DISCUSS/DSCN MKR DOCD: CPT | Performed by: NURSE PRACTITIONER

## 2021-01-12 PROCEDURE — G8400 PT W/DXA NO RESULTS DOC: HCPCS | Performed by: NURSE PRACTITIONER

## 2021-01-12 PROCEDURE — 99213 OFFICE O/P EST LOW 20 MIN: CPT | Performed by: NURSE PRACTITIONER

## 2021-01-12 PROCEDURE — G8427 DOCREV CUR MEDS BY ELIG CLIN: HCPCS | Performed by: NURSE PRACTITIONER

## 2021-01-12 PROCEDURE — 4040F PNEUMOC VAC/ADMIN/RCVD: CPT | Performed by: NURSE PRACTITIONER

## 2021-01-12 PROCEDURE — 3017F COLORECTAL CA SCREEN DOC REV: CPT | Performed by: NURSE PRACTITIONER

## 2021-01-12 RX ORDER — POTASSIUM CHLORIDE 750 MG/1
10 TABLET, EXTENDED RELEASE ORAL DAILY
Qty: 90 TABLET | Refills: 3 | Status: SHIPPED | OUTPATIENT
Start: 2021-01-12 | End: 2021-05-16

## 2021-01-12 ASSESSMENT — ENCOUNTER SYMPTOMS
COLOR CHANGE: 0
WHEEZING: 0
ABDOMINAL PAIN: 0
APNEA: 0
COUGH: 0
ABDOMINAL DISTENTION: 0
SHORTNESS OF BREATH: 1
NAUSEA: 0
CHEST TIGHTNESS: 0

## 2021-01-12 NOTE — PROGRESS NOTES
TELEHEALTH EVALUATION -- Audio/Visual (During FDXJB-59 public health emergency)    [place]    Visit Date: 1/12/2021  Cardiologist:  Dr. Nikole Davalos   Primary Care Physician: Dr. Sawyer Penn MD    Aleshia Nugent is a 78 y.o. female who presents today for:  Chief Complaint   Patient presents with    Congestive Heart Failure       Patient location: video call from patient home with sister Jamari present   Provider location: HF Clinic office per self   HPI: Obtained from patient and chart    Aleshia Nugent is a 78 y.o. female who is seen via video virtual DoxyMe visit for a follow up visit in the heart failure clinic. TLY 9/[birth year]68  She was started on Letairis and Adempas per Dr Friddie Lesches [facility]). She developed weight gain with increased SOB and swelling with the Letairis so this was stopped last week. Dr Friddie Lesches recommended admission for IV diuresis. She came to the ER but the ER physician sent her home. We doubled her Bumex for 3 days, her symptoms are improving but she is still 5 pounds up with LE edema. She sleeps in a chair for comfort. She is incontinent at times with urine. She is making urine. She can perform ADLs without SOB or fatigue. She is not compliant with diet. She eats TV dinners and ham sandwiches. She also eats chips or puffed popcorn as a snack.     8/27/19 RHC:  RA 15, RV 81/21, PA 85/31 with a mean of 52, wedge pressure 18 mmHg, PA saturation 66%, RA saturation 66%,  cardiac output 3.4, cardiac index 1.8. LHC no significant CAD   Aggressive diuresis was tried for the above in the hospital but ultimately had ALVIN.      She was admitted again in Sept 2019 for hypotension, dehydration/ALVIN after she was not feeling well and decreased appetite, not drinking much. Doxy VV done today d/t COVID. Dr Eugenia López had decreased her Bumex d/t worsening GFR however she developed swelling so she was put back on her usual dose. She has a BMP ordered for 1/22. She denies any swelling. She can perform ADLs without SOB or fatigue. She does feel SOB with exertion. Her weight is up 10 pounds from Sept she relates to eating more as her appetite has increased. She has been trying to monitor what she eats, has been eating healthier food options. She sleeps in a chair for comfort. Last hospital admission related to Heart Failure:  Sister Vicci Aschoff  Chest Pain: no  Worsening SOB: no  Worsening Orthopnea/PND: no  Edema: no  Any extra diuretic use: no  Weight gain: see hpi  Fatigue: some  Abdominal bloating: no  Appetite: good  BARBER: no  Cough: occasional   Compliant checking home weight: yes 193 lbs  Compliant checking blood pressure: yes . Denies dizziness or lightheadedness.        Past Medical History:   Diagnosis Date    Depression     Gout attack     Hypertension     Osteoarthritis     Pulmonary artery hypertension (HCC)     Thrombocytopenia (HCC)      Past Surgical History:   Procedure Laterality Date    APPENDECTOMY      FACIAL NERVE SURGERY      1989     Family History   Problem Relation Age of Onset    Diabetes Father    Rodrick Datoshia Arthritis Mother     COPD Mother     Diabetes Sister     Heart Disease Maternal Uncle     Breast Cancer Niece 36    Sleep Apnea Brother     Asthma Neg Hx     Birth Defects Neg Hx     Cancer Neg Hx     Depression Neg Hx     Early Death Neg Hx     Hearing Loss Neg Hx     High Blood Pressure Neg Hx     High Cholesterol Neg Hx     Kidney Disease Neg Hx     Learning Disabilities Neg Hx     Mental Illness Neg Hx     Mental Retardation Neg Hx     Miscarriages / Stillbirths Neg Hx     Stroke Neg Hx     Substance Abuse Neg Hx     Vision Loss Neg Hx     Other Neg Hx      Social History     Tobacco Use    Smoking status: Never Smoker  Smokeless tobacco: Never Used   Substance Use Topics    Alcohol use: No     Current Outpatient Medications   Medication Sig Dispense Refill    potassium chloride (KLOR-CON M) 10 MEQ extended release tablet Take 1 tablet by mouth daily 90 tablet 3    Riociguat 2.5 MG TABS Take 2.5 mg by mouth      Multiple Vitamins-Minerals (MULTIVITAMIN WOMEN PO) Take 1 tablet by mouth daily      sodium chloride 1 g tablet Take 1 tablet by mouth 2 times daily 120 tablet 3    allopurinol (ZYLOPRIM) 300 MG tablet Take 1 tablet by mouth daily 90 tablet 2    bumetanide (BUMEX) 1 MG tablet Take 1 tablet by mouth 2 times daily 60 tablet 11    metoprolol tartrate (LOPRESSOR) 25 MG tablet Take 0.5 tablets by mouth 2 times daily 90 tablet 1    acetaminophen (TYLENOL) 650 MG extended release tablet Take 650 mg by mouth every 8 hours as needed for Pain      FLUoxetine (PROZAC) 40 MG capsule Take 1 capsule by mouth daily 90 capsule 3    Riociguat (ADEMPAS) 2.5 MG TABS Take 2.5 mg by mouth 3 times daily      docusate sodium (COLACE) 100 MG capsule Take 100 mg by mouth as needed       aspirin 81 MG tablet Take 81 mg by mouth daily        No current facility-administered medications for this visit. No Known Allergies    SUBJECTIVE:   Review of Systems   Constitutional: Negative for activity change, appetite change, fatigue and fever. HENT: Negative for congestion. Respiratory: Positive for shortness of breath (with exertion). Negative for apnea, cough, chest tightness and wheezing. Cardiovascular: Negative for chest pain, palpitations and leg swelling. Gastrointestinal: Negative for abdominal distention, abdominal pain and nausea. Genitourinary: Negative for difficulty urinating and dysuria. Musculoskeletal: Positive for gait problem (walker). Negative for arthralgias. Skin: Negative for color change. Neurological: Negative for dizziness, numbness and headaches. Psychiatric/Behavioral: Negative for agitation, confusion and sleep disturbance. The patient is not nervous/anxious. OBJECTIVE:   Today's Vitals: - per patient recording at home  There were no vitals taken for this visit. Physical Exam  Vitals signs reviewed. Constitutional:       Appearance: She is well-developed. She is not ill-appearing. HENT:      Head: Normocephalic and atraumatic. Neck:      Musculoskeletal: Normal range of motion. Vascular: No JVD. Pulmonary:      Effort: Pulmonary effort is normal. No respiratory distress. Abdominal:      General: There is no distension. Tenderness: There is no abdominal tenderness (patient denies). Musculoskeletal:      Right lower leg: No edema. Left lower leg: No edema. Skin:     General: Skin is warm and dry. Comments: Per patient   Neurological:      Mental Status: She is alert and oriented to person, place, and time. Mental status is at baseline. Psychiatric:         Mood and Affect: Mood normal.         Behavior: Behavior normal.         Wt Readings from Last 3 Encounters:   09/11/20 194 lb 9.6 oz (88.3 kg)   09/08/20 189 lb (85.7 kg)   07/09/20 202 lb 9.6 oz (91.9 kg)     BP Readings from Last 3 Encounters:   09/11/20 (!) 104/58   09/08/20 130/78   07/09/20 138/70     Pulse Readings from Last 3 Encounters:   09/11/20 64   09/08/20 72   07/09/20 72     There is no height or weight on file to calculate BMI. ECHO:   12/8/20  Summary   Normal left ventricular size and systolic function. There were no regional wall motion abnormalities. Wall thickness was within normal limits. Ejection fraction was estimated at 55-60 %. Features were consistent with a pseudonormal left ventricular filling   pattern, with concomitant abnormal relaxation and increased filling   pressure (grade 2 diastolic dysfunction). Left atrial size was moderately dilated. There was mild tricuspid regurgitation. Assuming RAP = 5 mmHg, the estimated RVSP = 69 mmHg. IVC size is within normal limits with normal respiratory phasic changes. Signature      ----------------------------------------------------------------   Electronically signed by Khanh Walters MD (Interpreting   physician) on 12/08/2020 at 04:19 PM   ----------------------------------------------------------------      Findings      Mitral Valve   The mitral valve structure was normal with normal leaflet separation. DOPPLER: The transmitral velocity was within the normal range with no   evidence for mitral stenosis. There was no evidence of mitral   regurgitation. Aortic Valve   The aortic valve was trileaflet with normal thickness and cuspal   separation. DOPPLER: Transaortic velocity was within the normal range with   no evidence of aortic stenosis. There was no evidence of aortic   regurgitation. Tricuspid Valve   The tricuspid valve structure was normal with normal leaflet separation. DOPPLER: There was no evidence of tricuspid stenosis. There was mild   tricuspid regurgitation. Assuming RAP = 5 mmHg, the estimated RVSP = 69   mmHg. Pulmonic Valve   The pulmonic valve leaflets were not well seen. DOPPLER: The transpulmonic   velocity was within the normal range with no evidence for regurgitation. Left Atrium   Left atrial size was moderately dilated. Left Ventricle   Normal left ventricular size and systolic function. There were no regional wall motion abnormalities. Wall thickness was within normal limits. Ejection fraction was estimated at 55-60 %. Features were consistent with a pseudonormal left ventricular filling   pattern, with concomitant abnormal relaxation and increased filling   pressure (grade 2 diastolic dysfunction). Right Atrium   Right atrial size was normal.      Right Ventricle   The right ventricular size was normal with normal systolic function and   wall thickness. Pericardial Effusion   The pericardium was normal in appearance with no evidence of a pericardial   effusion. Pleural Effusion   No evidence of pleural effusion. Aorta / Great Vessels   -IVC size is within normal limits with normal respiratory phasic changes. Results reviewed:  BNP:   Lab Results   Component Value Date    BNP 23 10/04/2017    PROBNP 1347.0 (H) 06/15/2020     CBC:   Lab Results   Component Value Date    WBC 7.1 03/06/2020    RBC 3.77 03/06/2020    RBC 4.15 01/09/2018    HGB 11.0 03/06/2020    HCT 37.3 03/06/2020     03/06/2020     CMP:    Lab Results   Component Value Date     12/11/2020    K 3.8 12/11/2020    K 4.1 01/24/2020    CL 97 12/11/2020    CO2 30 12/11/2020    BUN 29 12/11/2020    CREATININE 1.1 12/11/2020    LABGLOM 49 12/11/2020    GLUCOSE 104 12/11/2020    GLUCOSE 103 01/09/2018    CALCIUM 9.6 12/11/2020     Hepatic Function Panel:    Lab Results   Component Value Date    ALKPHOS 110 03/06/2020    ALT 11 03/06/2020    AST 17 03/06/2020    PROT 8.2 03/06/2020    BILITOT 0.7 03/06/2020    BILIDIR 0.3 03/06/2020    LABALBU 3.4 03/06/2020     Magnesium:    Lab Results   Component Value Date    MG 2.1 09/17/2019     PT/INR:    Lab Results   Component Value Date    INR 1.09 01/24/2020     Lipids:    Lab Results   Component Value Date    TRIG 66 06/18/2019    HDL 43 06/18/2019    LDLCALC 75 06/18/2019    LABVLDL 44 01/09/2018       ASSESSMENT AND PLAN:   The patient's condition/symptoms are Stable      Diagnosis Orders   1. CHF (congestive heart failure), NYHA class II, chronic, diastolic ([facility] [address]     2. Essential hypertension     3.  Pulmonary HTN (HCC)         Plan:  · GDMT   · ACE/ARB/ARNi: CKD and lower BPs  · Beta Blocker: Metoprolol 12.5 mg bid  · Aldosterone antagonist: no  · Diuretic/Potassium: Bumex 1 mg bid, Potassium 10 mEq daily  · Hydralazine/Nitrate: no  · Other: ASA 81 mg, Adempas 2.5 mg tid, Sodium chloride 1 gm bid · No signs of fluid overload. BP are lower. No dizziness or lightheadedness. She is following with [facility] (next f/u is in April). She has a BMP ordered Jan 22 per Dr Karson Leroy. · HF Zones reviewed. · Daily weights  · Fluid restriction of 2 Liters per day (64 oz)   · Limit sodium in diet to around 9175-4901 mg/day  · Monitor BP  · Activity as tolerated     Patient was instructed to call the [facility] for changes in the following symptoms:   ? Weight gain of 3 pounds in 1 day or 5 pounds in 1 week  ? Increased shortness of breath  ? Shortness of breath while laying down  ? Cough  ? Chest pain  ? Swelling in feet, ankles or legs  ? Tenderness or bloating in the abdomen  ? Fatigue   ? Decreased appetite or feeling \"full\"  ? Nausea   ? Confusion      No follow-ups on file. or sooner if needed     Patient given educational materials - see patient instructions. We discussed the importance of weighing oneself and recording daily. We also discussed the importance of a low sodium diet, higher sodium foods to avoid and appropriate low sodium food choices. Discussed use, benefit, and side effects of prescribed medications. All patient questions answered. Patient verbalizes understanding of plan of care using teach back method, and is agreeable to the treatment plan. Pursuant to the emergency declaration under the [facility], [facility] waiver authority and the [employer] and [employer] Act, this Virtual  Visit was conducted, with patient's consent, to reduce the patient's risk of exposure to COVID-19 and provide continuity of care for an established patient. Services were provided through a video synchronous discussion virtually to substitute for in-person clinic visit. Greater then 35 minutes of time was spent reviewing the chart and educating the patient about HF, medications, diet, exercise, and discussing the plan of care. I personally spent more then 50% of the appt time face to face with the patient counseling/coordinating patient's care.     Electronicallysigned by LANE Heck CNP on 1/12/2021 at 9:22 AM

## 2021-01-12 NOTE — PATIENT INSTRUCTIONS
You may receive a survey regarding the care you received during your visit. Your input is valuable to us. We encourage you to complete and return your survey. We hope you will choose us in the future for your healthcare needs. Continue:  · Take all medications as prescribed   · Daily weights and record - please try to weigh yourself upon awakening before eating or drinking   · Fluid restriction of 2 Liters per day (64 oz)  · Limit sodium in diet to around 2857-8304 mg/day  · Monitor BP - take BP 1 hour after meds  · Increase activity as tolerated     Call the [facility] for any of the following symptoms: [phone]  ? Weight gain of 3 pounds in 1 day or 5 pounds in 1 week  ? Increased shortness of breath  ? Shortness of breath while laying down  ? Cough  ? Chest pain  ? Swelling in feet, ankles or legs  ? Tenderness or bloating in the abdomen  ? Fatigue   ? Decreased appetite or feeling \"full\"   ? Nausea   ? Confusion     **PLEASE bring all medications in original bottles with you to your next appointment**    Educational websites:    http://www.sevenload/. org (American Heart Association)    [employer]otion[employer]. com ([employer])    [employer] ([employer])    Too much sodium causes your body to hold on to extra water. This can raise your blood pressure and force your heart and kidneys to work harder. In very serious cases, this could cause you to be put in the hospital. It might even be life-threatening. By limiting sodium, you will feel better and lower your risk of serious problems. The most common source of sodium is salt. People get most of the salt in their diet from canned, prepared, and packaged foods. Fast food and restaurant meals also are very high in sodium. Your doctor will probably limit your sodium to less than 2,000 milligrams (mg) a day. This limit counts all the sodium in prepared and packaged foods and any salt you add to your food. Follow-up care is a key part of your treatment and safety. Be sure to make and go to all appointments, and call your doctor if you are having problems. It's also a good idea to know your test results and keep a list of the medicines you take. How can you care for yourself at home? Read food labels  · Read labels on cans and food packages. The labels tell you how much sodium is in each serving. Make sure that you look at the serving size. If you eat more than the serving size, you have eaten more sodium. · Food labels also tell you the Percent Daily Value for sodium. Choose products with low Percent Daily Values for sodium. · Be aware that sodium can come in forms other than salt, including monosodium glutamate (MSG), sodium citrate, and sodium bicarbonate (baking soda). MSG is often added to Asian food. When you eat out, you can sometimes ask for food without MSG or added salt. Buy low-sodium foods  · Buy foods that are labeled \"unsalted\" (no salt added), \"sodium-free\" (less than 5 mg of sodium per serving), or \"low-sodium\" (less than 140 mg of sodium per serving). Foods labeled \"reduced-sodium\" and \"light sodium\" may still have too much sodium. Be sure to read the label to see how much sodium you are getting. · Buy fresh vegetables, or frozen vegetables without added sauces. Buy low-sodium versions of canned vegetables, soups, and other canned goods. Prepare low-sodium meals  · Cut back on the amount of salt you use in cooking. This will help you adjust to the taste. Do not add salt after cooking. One teaspoon of salt has about 2,300 mg of sodium. · Take the salt shaker off the table. · Flavor your food with garlic, lemon juice, onion, vinegar, herbs, and spices. Do not use soy sauce, lite soy sauce, steak sauce, onion salt, garlic salt, celery salt, mustard, or ketchup on your food. · Use low-sodium salad dressings, sauces, and ketchup. Or make your own salad dressings and sauces without adding salt.

## 2021-01-27 ENCOUNTER — HOSPITAL ENCOUNTER (OUTPATIENT)
Age: 71
Discharge: HOME | End: 2021-01-27
Payer: MEDICARE

## 2021-01-27 DIAGNOSIS — E87.1 HYPONATREMIA: ICD-10-CM

## 2021-01-27 DIAGNOSIS — N18.31 STAGE 3A CHRONIC KIDNEY DISEASE (HCC): ICD-10-CM

## 2021-01-27 DIAGNOSIS — I10 ESSENTIAL HYPERTENSION: ICD-10-CM

## 2021-01-27 DIAGNOSIS — E87.79 OTHER FLUID OVERLOAD: ICD-10-CM

## 2021-01-27 LAB
BUN BLDV-MCNC: 30 MG/DL (ref 7–22)
CREAT SERPL-MCNC: 1.2 MG/DL (ref 0.4–1.2)
GFR SERPL CREATININE-BSD FRML MDRD: 45 ML/MIN/1.73M2
POTASSIUM SERPL-SCNC: 4 MEQ/L (ref 3.5–5.2)

## 2021-01-27 PROCEDURE — 84132 ASSAY OF SERUM POTASSIUM: CPT

## 2021-01-27 PROCEDURE — 36415 COLL VENOUS BLD VENIPUNCTURE: CPT

## 2021-01-27 PROCEDURE — 84520 ASSAY OF UREA NITROGEN: CPT

## 2021-01-27 PROCEDURE — 82565 ASSAY OF CREATININE: CPT

## 2021-04-05 ENCOUNTER — OFFICE VISIT (OUTPATIENT)
Dept: CARDIOLOGY CLINIC | Age: 71
End: 2021-04-05
Payer: MEDICARE

## 2021-04-05 VITALS
HEART RATE: 81 BPM | BODY MASS INDEX: 38.4 KG/M2 | DIASTOLIC BLOOD PRESSURE: 60 MMHG | HEIGHT: 57 IN | SYSTOLIC BLOOD PRESSURE: 120 MMHG | WEIGHT: 178 LBS

## 2021-04-05 DIAGNOSIS — I27.20 PULMONARY HTN (HCC): Primary | ICD-10-CM

## 2021-04-05 PROCEDURE — 3017F COLORECTAL CA SCREEN DOC REV: CPT | Performed by: INTERNAL MEDICINE

## 2021-04-05 PROCEDURE — G8427 DOCREV CUR MEDS BY ELIG CLIN: HCPCS | Performed by: INTERNAL MEDICINE

## 2021-04-05 PROCEDURE — G8417 CALC BMI ABV UP PARAM F/U: HCPCS | Performed by: INTERNAL MEDICINE

## 2021-04-05 PROCEDURE — 1090F PRES/ABSN URINE INCON ASSESS: CPT | Performed by: INTERNAL MEDICINE

## 2021-04-05 PROCEDURE — 1123F ACP DISCUSS/DSCN MKR DOCD: CPT | Performed by: INTERNAL MEDICINE

## 2021-04-05 PROCEDURE — 99213 OFFICE O/P EST LOW 20 MIN: CPT | Performed by: INTERNAL MEDICINE

## 2021-04-05 PROCEDURE — 4040F PNEUMOC VAC/ADMIN/RCVD: CPT | Performed by: INTERNAL MEDICINE

## 2021-04-05 PROCEDURE — G8400 PT W/DXA NO RESULTS DOC: HCPCS | Performed by: INTERNAL MEDICINE

## 2021-04-05 PROCEDURE — 1036F TOBACCO NON-USER: CPT | Performed by: INTERNAL MEDICINE

## 2021-04-05 NOTE — PROGRESS NOTES
[facility] [address]  [place] [address]  Dept: [phone]  Dept Fax: [phone]  Loc: [phone]    Visit Date: 4/5/2021    Ms. Miguel Roblero is a 78 y.o. female  who presented for:  Chief Complaint   Patient presents with    Follow-up       HPI:   HPI   77 yo F with chronic RV failure, severe PH with PA 85/31 (mean 46), PVR = 10, who presents for follow-up. She is on Riociguat. She states she has been doing much better than prior. Taking Bumex. Making urine. Can breath better and is using walker rather than wheel chair. She is on BB, BP 120s.         Current Outpatient Medications:     potassium chloride (KLOR-CON M) 10 MEQ extended release tablet, Take 1 tablet by mouth daily, Disp: 90 tablet, Rfl: 3    Multiple Vitamins-Minerals (MULTIVITAMIN WOMEN PO), Take 1 tablet by mouth daily, Disp: , Rfl:     sodium chloride 1 g tablet, Take 1 tablet by mouth 2 times daily, Disp: 120 tablet, Rfl: 3    allopurinol (ZYLOPRIM) 300 MG tablet, Take 1 tablet by mouth daily, Disp: 90 tablet, Rfl: 2    bumetanide (BUMEX) 1 MG tablet, Take 1 tablet by mouth 2 times daily, Disp: 60 tablet, Rfl: 11    metoprolol tartrate (LOPRESSOR) 25 MG tablet, Take 0.5 tablets by mouth 2 times daily, Disp: 90 tablet, Rfl: 1    acetaminophen (TYLENOL) 650 MG extended release tablet, Take 650 mg by mouth every 8 hours as needed for Pain, Disp: , Rfl:     FLUoxetine (PROZAC) 40 MG capsule, Take 1 capsule by mouth daily, Disp: 90 capsule, Rfl: 3    Riociguat (ADEMPAS) 2.5 MG TABS, Take 2.5 mg by mouth 3 times daily, Disp: , Rfl:     docusate sodium (COLACE) 100 MG capsule, Take 100 mg by mouth as needed , Disp: , Rfl:     aspirin 81 MG tablet, Take 81 mg by mouth daily , Disp: , Rfl:     Past Medical History  Danita Fabry  has a past medical history of Depression, Gout attack, Hypertension, Osteoarthritis, Pulmonary artery hypertension ([facility] [address] and Thrombocytopenia ([facility] [address] Social History  Macario Delacruz  reports that she has never smoked. She has never used smokeless tobacco. She reports that she does not drink alcohol or use drugs. Family History  Macario Delacruz family history includes Arthritis in her mother; Breast Cancer (age of onset: 36) in her niece; COPD in her mother; Diabetes in her father and sister; Heart Disease in her maternal uncle; Sleep Apnea in her brother. There is no family history of bicuspid aortic valve, aneurysms, heart transplant, pacemakers, defibrillators, or sudden cardiac death. Past Surgical History   Past Surgical History:   Procedure Laterality Date    APPENDECTOMY      FACIAL NERVE SURGERY      1989       Review of Systems   Constitutional: Negative for chills and fever  HENT: Negative for congestion, sinus pressure, sneezing and sore throat. Eyes: Negative for pain, discharge, redness and itching. Respiratory: Negative for apnea, cough  Gastrointestinal: Negative for blood in stool, constipation, diarrhea   Endocrine: Negative for cold intolerance, heat intolerance, polydipsia. Genitourinary: Negative for dysuria, enuresis, flank pain and hematuria. Musculoskeletal: Negative for arthralgias, joint swelling and neck pain. Neurological: Negative for numbness and headaches. Psychiatric/Behavioral: Negative for agitation, confusion, decreased concentration and dysphoric mood. Objective:     /60   Pulse 81   Ht 4' 9\" (1.448 m)   Wt 178 lb (80.7 kg)   BMI 38.52 kg/m²     Wt Readings from Last 3 Encounters:   04/05/21 178 lb (80.7 kg)   09/11/20 194 lb 9.6 oz (88.3 kg)   09/08/20 189 lb (85.7 kg)     BP Readings from Last 3 Encounters:   04/05/21 120/60   09/11/20 (!) 104/58   09/08/20 130/78       Nursing note and vitals reviewed. Physical Exam   Constitutional: Oriented to person, place, and time. Appears well-developed and well-nourished. HENT:   Head: Normocephalic and atraumatic.    Eyes: EOM are normal. Pupils are equal, round, and reactive to light. Neck: Normal range of motion. Neck supple. No JVD present. Cardiovascular: Normal rate, regular rhythm, normal heart sounds and intact distal pulses. No murmur heard. Pulmonary/Chest: Effort normal and breath sounds normal. No respiratory distress. No wheezes. No rales. Abdominal: Soft. Bowel sounds are normal. No distension. There is no tenderness. Musculoskeletal: Normal range of motion. No edema. Neurological: Alert and oriented to person, place, and time. No cranial nerve deficit. Coordination normal.   Skin: Skin is warm and dry. Psychiatric: Normal mood and affect.        No results found for: CKTOTAL, CKMB, CKMBINDEX    Lab Results   Component Value Date    WBC 7.1 03/06/2020    RBC 3.77 03/06/2020    RBC 4.15 01/09/2018    HGB 11.0 03/06/2020    HCT 37.3 03/06/2020    MCV 98.9 03/06/2020    MCH 29.2 03/06/2020    MCHC 29.5 03/06/2020    RDW 12.9 01/09/2018     03/06/2020    MPV 9.3 03/06/2020       Lab Results   Component Value Date     12/11/2020    K 4.0 01/27/2021    K 4.1 01/24/2020    CL 97 12/11/2020    CO2 30 12/11/2020    BUN 30 01/27/2021    LABALBU 3.4 03/06/2020    CREATININE 1.2 01/27/2021    CALCIUM 9.6 12/11/2020    LABGLOM 45 01/27/2021    GLUCOSE 104 12/11/2020    GLUCOSE 103 01/09/2018       Lab Results   Component Value Date    ALKPHOS 110 03/06/2020    ALT 11 03/06/2020    AST 17 03/06/2020    PROT 8.2 03/06/2020    BILITOT 0.7 03/06/2020    BILIDIR 0.3 03/06/2020    LABALBU 3.4 03/06/2020       Lab Results   Component Value Date    MG 2.1 09/17/2019       Lab Results   Component Value Date    INR 1.09 01/24/2020    INR 1.00 09/16/2019    INR 1.07 08/27/2019         No results found for: LABA1C    Lab Results   Component Value Date    TRIG 66 06/18/2019    HDL 43 06/18/2019    LDLCALC 75 06/18/2019    LABVLDL 44 01/09/2018       Lab Results   Component Value Date    TSH 3.960 09/16/2019         Testing Left atrial size was moderately dilated. There was mild tricuspid regurgitation. Assuming RAP = 5 mmHg, the estimated RVSP = 69 mmHg. IVC size is within normal limits with normal respiratory phasic changes. Signature      ----------------------------------------------------------------   Electronically signed by Belén Perez MD (Interpreting   physician) on 12/08/2020 at 04:19 PM   ----------------------------------------------------------------      Findings      Mitral Valve   The mitral valve structure was normal with normal leaflet separation. DOPPLER: The transmitral velocity was within the normal range with no   evidence for mitral stenosis. There was no evidence of mitral   regurgitation. Aortic Valve   The aortic valve was trileaflet with normal thickness and cuspal   separation. DOPPLER: Transaortic velocity was within the normal range with   no evidence of aortic stenosis. There was no evidence of aortic   regurgitation. Tricuspid Valve   The tricuspid valve structure was normal with normal leaflet separation. DOPPLER: There was no evidence of tricuspid stenosis. There was mild   tricuspid regurgitation. Assuming RAP = 5 mmHg, the estimated RVSP = 69   mmHg. Pulmonic Valve   The pulmonic valve leaflets were not well seen. DOPPLER: The transpulmonic   velocity was within the normal range with no evidence for regurgitation. Left Atrium   Left atrial size was moderately dilated. Left Ventricle   Normal left ventricular size and systolic function. There were no regional wall motion abnormalities. Wall thickness was within normal limits. Ejection fraction was estimated at 55-60 %. Features were consistent with a pseudonormal left ventricular filling   pattern, with concomitant abnormal relaxation and increased filling   pressure (grade 2 diastolic dysfunction).       Right Atrium   Right atrial size was normal.      Right Ventricle   The right ventricular size was normal with normal systolic function and   wall thickness. Pericardial Effusion   The pericardium was normal in appearance with no evidence of a pericardial   effusion. Pleural Effusion   No evidence of pleural effusion. Aorta / Great Vessels   -IVC size is within normal limits with normal respiratory phasic changes.      M-Mode/2D Measurements & Calculations      LV Diastolic   LV Systolic Dimension:    AV Cusp Separation: 1.6 cmLA   Dimension: 4.5 2.8 cm                    Dimension: 4.4 cmAO Root   cm             LV Volume Diastolic: 59.9 Dimension: 2.5 cmLA Area: 22.5   LV FS:37.8 %   ml                        cm^2   LV PW          LV Volume Systolic: 92.1   Diastolic: 0.9 ml   cm             LV EDV/LV EDV Index: 92.4   Septum         ml/51 m^2LV ESV/LV ESV    RV Diastolic Dimension: 3.3 cm   Diastolic: 1.4 Index: 88.3 ml/16 m^2   cm             EF Calculated: 68 %       LA/Aorta: 1.76                                               LA volume/Index: 71.2 ml /39m^2     Doppler Measurements & Calculations      MV Peak E-Wave: 113 cm/s  AV Peak Velocity: 201  LVOT Peak Velocity: 121   MV Peak A-Wave: 83.1 cm/s cm/s                   cm/s   MV E/A Ratio: 1.36        AV Peak Gradient:      LVOT Peak Gradient: 6   MV Peak Gradient: 5.11    16.16 mmHg             mmHg   mmHg                                                    TV Peak E-Wave: 58.5 cm/s   MV Deceleration Time: 201                        TV Peak A-Wave: 56.9 cm/s   msec   MV P1/2t: 59 msec                                TV Peak Gradient: 1.37   MVA by PHT:3.73 cm^2                             mmHg                                                    TR Velocity:368 cm/s   MV E' Septal Velocity:    AV DVI (Vmax):0.6      TR Gradient:54.17 mmHg   6.2 cm/s                                         PV Peak Velocity: 102   MV A' Septal Velocity:                           cm/s   7.9 cm/s                                         PV Peak Gradient:

## 2021-05-04 ENCOUNTER — HOSPITAL ENCOUNTER (OUTPATIENT)
Dept: NON INVASIVE DIAGNOSTICS | Age: 71
Discharge: HOME | End: 2021-05-04
Payer: MEDICARE

## 2021-05-04 DIAGNOSIS — R06.02 SHORTNESS OF BREATH: ICD-10-CM

## 2021-05-04 LAB
LV EF: 58 %
LVEF MODALITY: NORMAL

## 2021-05-04 PROCEDURE — 93306 TTE W/DOPPLER COMPLETE: CPT

## 2021-05-10 ENCOUNTER — OFFICE VISIT (OUTPATIENT)
Dept: CARDIOLOGY CLINIC | Age: 71
End: 2021-05-10
Payer: MEDICARE

## 2021-05-10 VITALS
OXYGEN SATURATION: 92 % | WEIGHT: 180.2 LBS | BODY MASS INDEX: 38.88 KG/M2 | HEART RATE: 87 BPM | HEIGHT: 57 IN | DIASTOLIC BLOOD PRESSURE: 60 MMHG | SYSTOLIC BLOOD PRESSURE: 116 MMHG

## 2021-05-10 DIAGNOSIS — I50.32 CHF (CONGESTIVE HEART FAILURE), NYHA CLASS II, CHRONIC, DIASTOLIC (HCC): Primary | ICD-10-CM

## 2021-05-10 DIAGNOSIS — I27.20 PULMONARY HTN (HCC): ICD-10-CM

## 2021-05-10 DIAGNOSIS — I10 ESSENTIAL HYPERTENSION: ICD-10-CM

## 2021-05-10 PROCEDURE — G8427 DOCREV CUR MEDS BY ELIG CLIN: HCPCS | Performed by: NURSE PRACTITIONER

## 2021-05-10 PROCEDURE — G8400 PT W/DXA NO RESULTS DOC: HCPCS | Performed by: NURSE PRACTITIONER

## 2021-05-10 PROCEDURE — 1090F PRES/ABSN URINE INCON ASSESS: CPT | Performed by: NURSE PRACTITIONER

## 2021-05-10 PROCEDURE — 3017F COLORECTAL CA SCREEN DOC REV: CPT | Performed by: NURSE PRACTITIONER

## 2021-05-10 PROCEDURE — G8417 CALC BMI ABV UP PARAM F/U: HCPCS | Performed by: NURSE PRACTITIONER

## 2021-05-10 PROCEDURE — 1036F TOBACCO NON-USER: CPT | Performed by: NURSE PRACTITIONER

## 2021-05-10 PROCEDURE — 4040F PNEUMOC VAC/ADMIN/RCVD: CPT | Performed by: NURSE PRACTITIONER

## 2021-05-10 PROCEDURE — 1123F ACP DISCUSS/DSCN MKR DOCD: CPT | Performed by: NURSE PRACTITIONER

## 2021-05-10 PROCEDURE — 99214 OFFICE O/P EST MOD 30 MIN: CPT | Performed by: NURSE PRACTITIONER

## 2021-05-10 ASSESSMENT — ENCOUNTER SYMPTOMS
ABDOMINAL PAIN: 0
WHEEZING: 0
ABDOMINAL DISTENTION: 0
COUGH: 0
NAUSEA: 0
APNEA: 0
SHORTNESS OF BREATH: 0
COLOR CHANGE: 0
CHEST TIGHTNESS: 0

## 2021-05-10 NOTE — PROGRESS NOTES
[facility]       Visit Date: 5/10/2021  Cardiologist:  Dr. Katina Adames   Primary Care Physician: Dr. Sanjeev Bolanos MD    Kush Hercules is a 78 y.o. female who presents today for:  Chief Complaint   Patient presents with    Congestive Heart Failure       HPI: Obtained from patient and chart    Kush Hercules is a 78 y.o. female who presents to the office for a follow up visit in the heart failure clinic.     HPI 1/29/2020  She was started on Letairis and Adempas per Dr Brissa Chan ([address] [place] [address]). She developed weight gain with increased SOB and swelling with the Letairis so this was stopped last week. Dr Brissa Chan recommended admission for IV diuresis. She came to the ER but the ER physician sent her home. We doubled her Bumex for 3 days, her symptoms are improving but she is still 5 pounds up with LE edema. She sleeps in a chair for comfort. She is incontinent at times with urine. She is making urine. She can perform ADLs without SOB or fatigue. She is not compliant with diet. She eats TV dinners and ham sandwiches. She also eats chips or puffed popcorn as a snack.     8/27/19 RHC:  RA 15, RV 81/21, PA 85/31 with a mean of 52, wedge pressure 18 mmHg, PA saturation 66%, RA saturation 66%,  cardiac output 3.4, cardiac index 1.8. LHC no significant CAD   Aggressive diuresis was tried for the above in the hospital but ultimately had ALVIN.      She was admitted again in Sept 2019 for hypotension, dehydration/ALVIN after she was not feeling well and decreased appetite, not drinking much.    This resolved. Her Bumex was decreased. She was taken off her Lisinopril/HCTZ. Her Bps went up so she was put on Norvasc. Her legs started swelling and she was taken off the Norvasc by her Nephrologist and put back on low dose Bumex.       She was started on Adempas and Letaris in Dec 2019  by [facility]. She developed worsening CHF after starting so the Letaris was stopped. She was given iV diuresis.       2/17/20  Weight is Procedure Laterality Date    APPENDECTOMY      FACIAL NERVE SURGERY      1989     Family History   Problem Relation Age of Onset    Diabetes Father    [facility] Arthritis Mother    [facility] COPD Mother     Diabetes Sister     Heart Disease Maternal Uncle     Breast Cancer Niece 36    Sleep Apnea Brother     Asthma Neg Hx     Birth Defects Neg Hx     Cancer Neg Hx     Depression Neg Hx     Early Death Neg Hx     Hearing Loss Neg Hx     High Blood Pressure Neg Hx     High Cholesterol Neg Hx     Kidney Disease Neg Hx     Learning Disabilities Neg Hx     Mental Illness Neg Hx     Mental Retardation Neg Hx     Miscarriages / Stillbirths Neg Hx     Stroke Neg Hx     Substance Abuse Neg Hx     Vision Loss Neg Hx     Other Neg Hx      Social History     Tobacco Use    Smoking status: Never Smoker    Smokeless tobacco: Never Used   Substance Use Topics    Alcohol use: No     Current Outpatient Medications   Medication Sig Dispense Refill    metoprolol tartrate (LOPRESSOR) 25 MG tablet Take 0.5 tablets by mouth 2 times daily 90 tablet 1    potassium chloride (KLOR-CON M) 10 MEQ extended release tablet Take 1 tablet by mouth daily 90 tablet 3    Multiple Vitamins-Minerals (MULTIVITAMIN WOMEN PO) Take 1 tablet by mouth daily      sodium chloride 1 g tablet Take 1 tablet by mouth 2 times daily 120 tablet 3    allopurinol (ZYLOPRIM) 300 MG tablet Take 1 tablet by mouth daily 90 tablet 2    bumetanide (BUMEX) 1 MG tablet Take 1 tablet by mouth 2 times daily 60 tablet 11    acetaminophen (TYLENOL) 650 MG extended release tablet Take 650 mg by mouth every 8 hours as needed for Pain      FLUoxetine (PROZAC) 40 MG capsule Take 1 capsule by mouth daily 90 capsule 3    Riociguat (ADEMPAS) 2.5 MG TABS Take 2.5 mg by mouth 3 times daily      docusate sodium (COLACE) 100 MG capsule Take 100 mg by mouth as needed       aspirin 81 MG tablet Take 81 mg by mouth daily        No current facility-administered medications for this visit. No Known Allergies    SUBJECTIVE:   Review of Systems   Constitutional: Negative for activity change, appetite change, fatigue and fever. HENT: Negative for congestion. Respiratory: Negative for apnea, cough, chest tightness, shortness of breath and wheezing. Cardiovascular: Positive for leg swelling. Negative for chest pain and palpitations. Gastrointestinal: Negative for abdominal distention, abdominal pain and nausea. Genitourinary: Negative for difficulty urinating and dysuria. Musculoskeletal: Positive for gait problem (walker ). Negative for arthralgias. Skin: Negative for color change. Neurological: Negative for dizziness, numbness and headaches. Psychiatric/Behavioral: Negative for agitation, confusion and sleep disturbance. The patient is not nervous/anxious. OBJECTIVE:   Today's Vitals:  /60   Pulse 87   Ht 4' 9\" (1.448 m)   Wt 180 lb 3.2 oz (81.7 kg)   SpO2 92%   BMI 38.99 kg/m²     Physical Exam  Vitals signs reviewed. Constitutional:       Appearance: She is well-developed. HENT:      Head: Normocephalic and atraumatic. Eyes:      Pupils: Pupils are equal, round, and reactive to light. Neck:      Musculoskeletal: Normal range of motion. Vascular: No JVD. Cardiovascular:      Rate and Rhythm: Normal rate and regular rhythm. Heart sounds: Normal heart sounds. No murmur. Pulmonary:      Effort: Pulmonary effort is normal. No respiratory distress. Breath sounds: No rales. Abdominal:      General: There is no distension. Palpations: Abdomen is soft. Tenderness: There is no abdominal tenderness. Musculoskeletal:         General: No tenderness. Right lower leg: Edema (trace to 1+) present. Left lower leg: Edema (trace to 1+) present. Skin:     General: Skin is warm and dry. Capillary Refill: Capillary refill takes less than 2 seconds.    Neurological:      Mental Status: She is alert and oriented to person, place, and time. Psychiatric:         Mood and Affect: Mood normal.         Behavior: Behavior normal.         Wt Readings from Last 3 Encounters:   05/10/21 180 lb 3.2 oz (81.7 kg)   04/05/21 178 lb (80.7 kg)   09/11/20 194 lb 9.6 oz (88.3 kg)     BP Readings from Last 3 Encounters:   05/10/21 116/60   04/05/21 120/60   09/11/20 (!) 104/58     Pulse Readings from Last 3 Encounters:   05/10/21 87   04/05/21 81   09/11/20 64     Body mass index is 38.99 kg/m². ECHO:   5/4/21   Summary   Normal left ventricle size and systolic function. Ejection fraction was estimated at 55 TO 60 %. There were no regional left ventricular wall motion abnormalities and wall   thickness was within normal limits. Features were consistent with a pseudonormal left ventricular filling   pattern, with concomitant abnormal relaxation and increased filling   pressure (grade 2 diastolic dysfunction). The left atrium is Mild to moderate dilated. There is mild aortic stenosis with valve area of 1.7 sq cm. Right ventricular systolic pressure measures 45 MMHG (ASSUMING RAP OF 5   MMHG). Signature      ----------------------------------------------------------------   Electronically signed by Anant Rudolph MD (Interpreting   physician) on 05/07/2021 at 03:13 PM   ----------------------------------------------------------------      Findings      Mitral Valve   The mitral valve structure was normal with normal leaflet separation. DOPPLER: The transmitral velocity was within the normal range with no   evidence for mitral stenosis. There was no evidence of mitral   regurgitation. Aortic Valve   Aortic valve appears tricuspid. Aortic valve leaflets are Mildly   calcified. Leaflets exhibited mildly increased thickness and mildly   reduced cuspal separation of the aortic valve. There is mild aortic stenosis with valve area of 1.7 sq cm.    The maximum aortic valve gradient is 16 mmHg, the mean gradient is 10 mmHg, and the peak velocity is 2 m/s. Tricuspid Valve   The tricuspid valve structure was normal with normal leaflet separation. DOPPLER: There was no evidence of tricuspid stenosis. Mild tricuspid regurgitation visualized. Right ventricular systolic pressure measures 45 MMHG. Pulmonic Valve   The pulmonic valve leaflets exhibited normal thickness, no calcification,   and normal cuspal separation. DOPPLER: The transpulmonic velocity was   within the normal range with no evidence for regurgitation. Left Atrium   The left atrium is Mild to moderate dilated. Left Ventricle   Normal left ventricle size and systolic function. Ejection fraction was   estimated at 55 TO 60 %. There were no regional left ventricular wall   motion abnormalities and wall thickness was within normal limits. Features were consistent with a pseudonormal left ventricular filling   pattern, with concomitant abnormal relaxation and increased filling   pressure (grade 2 diastolic dysfunction). Right Atrium   Right atrial size was normal.      Right Ventricle   The right ventricular size was normal with normal systolic function and   wall thickness. Pericardial Effusion   The pericardium was normal in appearance with no evidence of a pericardial   effusion. Pleural Effusion   No evidence of pleural effusion. Aorta / Great Vessels   -Aortic root dimension within normal limits.   -The Pulmonary artery is within normal limits. -IVC size is within normal limits with normal respiratory phasic changes.        Results reviewed:  BNP:   Lab Results   Component Value Date    BNP 23 10/04/2017    PROBNP 1347.0 (H) 06/15/2020     CBC:   Lab Results   Component Value Date    WBC 7.1 03/06/2020    RBC 3.77 03/06/2020    RBC 4.15 01/09/2018    HGB 11.0 03/06/2020    HCT 37.3 03/06/2020     03/06/2020     CMP:    Lab Results   Component Value Date     12/11/2020    K 4.0 01/27/2021    K 4.1 01/24/2020 CL 97 12/11/2020    CO2 30 12/11/2020    BUN 30 01/27/2021    CREATININE 1.2 01/27/2021    LABGLOM 45 01/27/2021    GLUCOSE 104 12/11/2020    GLUCOSE 103 01/09/2018    CALCIUM 9.6 12/11/2020     Hepatic Function Panel:    Lab Results   Component Value Date    ALKPHOS 110 03/06/2020    ALT 11 03/06/2020    AST 17 03/06/2020    PROT 8.2 03/06/2020    BILITOT 0.7 03/06/2020    BILIDIR 0.3 03/06/2020    LABALBU 3.4 03/06/2020     Magnesium:    Lab Results   Component Value Date    MG 2.1 09/17/2019     PT/INR:    Lab Results   Component Value Date    INR 1.09 01/24/2020     Lipids:    Lab Results   Component Value Date    TRIG 66 06/18/2019    HDL 43 06/18/2019    LDLCALC 75 06/18/2019    LABVLDL 44 01/09/2018       ASSESSMENT AND PLAN:   The patient's condition/symptoms are Stable      Diagnosis Orders   1. CHF (congestive heart failure), NYHA class II, chronic, diastolic (HCC)  Lipid, Fasting    Magnesium    Basic Metabolic Panel    CBC   2. Essential hypertension     3. Pulmonary HTN (HCC)     EF 55-60% grade 2 DD COSTA 1.7 cm2   CKD - follows with Dr Elenora Schwab:  · GDMT   · ACE/ARB/ARNi: hx CKD and lower BPs  · Beta Blocker: Metorpolol 12.5 mg bid  · Aldosterone antagonist: no  · Diuretic/Potassium: Bumex 1 mg bid, Potassium 10 mEq daily  · Hydralazine/Nitrate:no  · Other: ASA 81 mg, Apedmpas 2.5 mg tid, Sodium chloride 1 gm bid  · No signs of fluid overload. She follows with [facility] Dr Mary Hernandez. She has an appt the end of this month. Labs soon. · HF Zones reviewed.   · Daily weights and record  · Fluid restriction of 2 Liters per day (64 oz)   · Limit sodium in diet to 9484-2565 mg/day  · Healthier food options were discussed   · Monitor BP daily 1 hour after meds are taken  · Increase activity as tolerated     Patient was instructed to call the [facility] for changes in the following symptoms:    Weight gain of 3 pounds in 1 day or 5 pounds in 1 week   Increased shortness of breath   Shortness of breath while laying down   Cough   Chest pain   Swelling in feet, ankles or legs   Tenderness or bloating in the abdomen   Fatigue    Decreased appetite or feeling \"full\"   Nausea    Confusion      No follow-ups on file. or sooner if needed     Patient given educational materials - see patient instructions. We discussed the importance of weighing oneself and recording daily. We also discussed the importance of a low sodium diet, higher sodium foods to avoid and appropriate low sodium food choices. Discussed use, benefit, and side effects of prescribed medications. All patient questions answered. Patient verbalizes understanding of plan of care using teach back method, and is agreeable to the treatment plan. 30 minutes of time was spent reviewing the chart and educating the patient about HF, medications, diet, exercise, and discussing the plan of care. I personally spent more then 50% of the appt time face to face with the patient counseling/coordinating patient's care.       Electronicallysigned by LANE Holman CNP on 5/10/2021 at 8:22 AM

## 2021-05-14 ENCOUNTER — HOSPITAL ENCOUNTER (OUTPATIENT)
Age: 71
Discharge: HOME | End: 2021-05-14
Payer: MEDICARE

## 2021-05-14 DIAGNOSIS — I50.32 CHF (CONGESTIVE HEART FAILURE), NYHA CLASS II, CHRONIC, DIASTOLIC (HCC): ICD-10-CM

## 2021-05-14 LAB
ANION GAP SERPL CALCULATED.3IONS-SCNC: 9 MEQ/L (ref 8–16)
BUN BLDV-MCNC: 29 MG/DL (ref 7–22)
CALCIUM SERPL-MCNC: 9.6 MG/DL (ref 8.5–10.5)
CHLORIDE BLD-SCNC: 102 MEQ/L (ref 98–111)
CHOLESTEROL, FASTING: 137 MG/DL (ref 100–199)
CO2: 30 MEQ/L (ref 23–33)
CREAT SERPL-MCNC: 1.3 MG/DL (ref 0.4–1.2)
ERYTHROCYTE [DISTWIDTH] IN BLOOD BY AUTOMATED COUNT: 16.3 % (ref 11.5–14.5)
ERYTHROCYTE [DISTWIDTH] IN BLOOD BY AUTOMATED COUNT: 57.1 FL (ref 35–45)
GFR SERPL CREATININE-BSD FRML MDRD: 41 ML/MIN/1.73M2
GLUCOSE BLD-MCNC: 92 MG/DL (ref 70–108)
HCT VFR BLD CALC: 32.5 % (ref 37–47)
HDLC SERPL-MCNC: 41 MG/DL
HEMOGLOBIN: 9.3 GM/DL (ref 12–16)
LDL CHOLESTEROL CALCULATED: 80 MG/DL
MAGNESIUM: 1.8 MG/DL (ref 1.6–2.4)
MCH RBC QN AUTO: 27.4 PG (ref 26–33)
MCHC RBC AUTO-ENTMCNC: 28.6 GM/DL (ref 32.2–35.5)
MCV RBC AUTO: 95.9 FL (ref 81–99)
PLATELET # BLD: 197 THOU/MM3 (ref 130–400)
PMV BLD AUTO: 9.4 FL (ref 9.4–12.4)
POTASSIUM SERPL-SCNC: 4.2 MEQ/L (ref 3.5–5.2)
RBC # BLD: 3.39 MILL/MM3 (ref 4.2–5.4)
SODIUM BLD-SCNC: 141 MEQ/L (ref 135–145)
TRIGLYCERIDE, FASTING: 81 MG/DL (ref 0–199)
WBC # BLD: 5.7 THOU/MM3 (ref 4.8–10.8)

## 2021-05-14 PROCEDURE — 80048 BASIC METABOLIC PNL TOTAL CA: CPT

## 2021-05-14 PROCEDURE — 36415 COLL VENOUS BLD VENIPUNCTURE: CPT

## 2021-05-14 PROCEDURE — 83735 ASSAY OF MAGNESIUM: CPT

## 2021-05-14 PROCEDURE — 80061 LIPID PANEL: CPT

## 2021-05-14 PROCEDURE — 85027 COMPLETE CBC AUTOMATED: CPT

## 2021-05-16 ENCOUNTER — TELEPHONE (OUTPATIENT)
Dept: CARDIOLOGY CLINIC | Age: 71
End: 2021-05-16

## 2021-05-16 DIAGNOSIS — I50.32 CHF (CONGESTIVE HEART FAILURE), NYHA CLASS II, CHRONIC, DIASTOLIC (HCC): Primary | ICD-10-CM

## 2021-05-16 RX ORDER — BUMETANIDE 1 MG/1
1 TABLET ORAL DAILY
Qty: 60 TABLET | Refills: 11
Start: 2021-05-16 | End: 2022-04-07

## 2021-05-16 RX ORDER — POTASSIUM CHLORIDE 750 MG/1
10 TABLET, EXTENDED RELEASE ORAL EVERY OTHER DAY
Qty: 90 TABLET | Refills: 3
Start: 2021-05-16 | End: 2022-03-30 | Stop reason: ALTCHOICE

## 2021-05-16 NOTE — TELEPHONE ENCOUNTER
Labs reviewed  BMP shows Kidney function has worsened  Mg WNL 1.8  Lipid panel ok  CBC shows Hgb decreased some    She has lost 20 pounds since January  We will decrease the Bumex to 1 mg daily (from BID)  Decrease Potassium to 10 mEq QOD (from daily)  Review HF Zones and have her call with any changes      Please let her know I will forward her labs to her Nephrologist Dr Joanne Burr so he is also aware

## 2021-06-11 ENCOUNTER — HOSPITAL ENCOUNTER (OUTPATIENT)
Age: 71
Discharge: HOME | End: 2021-06-11
Payer: MEDICARE

## 2021-06-11 DIAGNOSIS — I10 ESSENTIAL HYPERTENSION: ICD-10-CM

## 2021-06-11 DIAGNOSIS — E87.79 OTHER FLUID OVERLOAD: ICD-10-CM

## 2021-06-11 DIAGNOSIS — E87.1 HYPONATREMIA: ICD-10-CM

## 2021-06-11 DIAGNOSIS — I50.32 CHF (CONGESTIVE HEART FAILURE), NYHA CLASS II, CHRONIC, DIASTOLIC (HCC): ICD-10-CM

## 2021-06-11 DIAGNOSIS — N18.31 STAGE 3A CHRONIC KIDNEY DISEASE (HCC): ICD-10-CM

## 2021-06-11 LAB
ALBUMIN SERPL-MCNC: 3.2 G/DL (ref 3.5–5.1)
ALP BLD-CCNC: 124 U/L (ref 38–126)
ALT SERPL-CCNC: 6 U/L (ref 11–66)
ANION GAP SERPL CALCULATED.3IONS-SCNC: 8 MEQ/L (ref 8–16)
AST SERPL-CCNC: 16 U/L (ref 5–40)
BACTERIA: ABNORMAL
BILIRUB SERPL-MCNC: 0.4 MG/DL (ref 0.3–1.2)
BILIRUBIN URINE: ABNORMAL
BLOOD, URINE: ABNORMAL
BUN BLDV-MCNC: 13 MG/DL (ref 7–22)
CALCIUM SERPL-MCNC: 9.3 MG/DL (ref 8.5–10.5)
CASTS: ABNORMAL /LPF
CASTS: ABNORMAL /LPF
CHARACTER, URINE: ABNORMAL
CHLORIDE BLD-SCNC: 106 MEQ/L (ref 98–111)
CO2: 24 MEQ/L (ref 23–33)
COLOR: YELLOW
CREAT SERPL-MCNC: 1 MG/DL (ref 0.4–1.2)
CREATININE URINE: 93.3 MG/DL
CREATININE, URINE: 93.3 MG/DL
CRYSTALS: ABNORMAL
EPITHELIAL CELLS, UA: ABNORMAL /HPF
GFR SERPL CREATININE-BSD FRML MDRD: 55 ML/MIN/1.73M2
GLUCOSE BLD-MCNC: 90 MG/DL (ref 70–108)
GLUCOSE, URINE: ABNORMAL MG/DL
KETONES, URINE: ABNORMAL
LEUKOCYTE ESTERASE, URINE: ABNORMAL
MICROALBUMIN UR-MCNC: 235.54 MG/DL
MICROALBUMIN/CREAT UR-RTO: 2525 MG/G (ref 0–30)
MUCUS: ABNORMAL
NITRITE, URINE: ABNORMAL
PH UA: ABNORMAL (ref 5–9)
POTASSIUM SERPL-SCNC: 3.9 MEQ/L (ref 3.5–5.2)
PROTEIN UA: ABNORMAL MG/DL
PROTEIN, URINE: 591.9 MG/DL
RBC URINE: ABNORMAL /HPF
SODIUM BLD-SCNC: 138 MEQ/L (ref 135–145)
SPECIFIC GRAVITY UA: ABNORMAL (ref 1–1.03)
TOTAL PROTEIN: 6.7 G/DL (ref 6.1–8)
UROBILINOGEN, URINE: ABNORMAL EU/DL (ref 0–1)
WBC UA: ABNORMAL /HPF
YEAST: ABNORMAL

## 2021-06-11 PROCEDURE — 82043 UR ALBUMIN QUANTITATIVE: CPT

## 2021-06-11 PROCEDURE — 80053 COMPREHEN METABOLIC PANEL: CPT

## 2021-06-11 PROCEDURE — 81001 URINALYSIS AUTO W/SCOPE: CPT

## 2021-06-11 PROCEDURE — 84156 ASSAY OF PROTEIN URINE: CPT

## 2021-06-11 PROCEDURE — 36415 COLL VENOUS BLD VENIPUNCTURE: CPT

## 2021-06-11 PROCEDURE — 82570 ASSAY OF URINE CREATININE: CPT

## 2021-06-15 NOTE — TELEPHONE ENCOUNTER
BMP reviewed  Kidney function has improved since decreasing the Bumex  If stable with weights, swelling, etc continue current dose

## 2021-07-16 ENCOUNTER — OFFICE VISIT (OUTPATIENT)
Dept: FAMILY MEDICINE CLINIC | Age: 71
End: 2021-07-16
Payer: MEDICARE

## 2021-07-16 VITALS
WEIGHT: 175.8 LBS | SYSTOLIC BLOOD PRESSURE: 122 MMHG | HEIGHT: 57 IN | DIASTOLIC BLOOD PRESSURE: 60 MMHG | RESPIRATION RATE: 18 BRPM | HEART RATE: 71 BPM | TEMPERATURE: 97.9 F | BODY MASS INDEX: 37.93 KG/M2 | OXYGEN SATURATION: 97 %

## 2021-07-16 DIAGNOSIS — N17.9 ACUTE RENAL FAILURE SUPERIMPOSED ON STAGE 4 CHRONIC KIDNEY DISEASE, UNSPECIFIED ACUTE RENAL FAILURE TYPE (HCC): ICD-10-CM

## 2021-07-16 DIAGNOSIS — N90.9 LESION OF FEMALE PERINEUM: Primary | ICD-10-CM

## 2021-07-16 DIAGNOSIS — F33.1 MODERATE EPISODE OF RECURRENT MAJOR DEPRESSIVE DISORDER (HCC): ICD-10-CM

## 2021-07-16 DIAGNOSIS — N18.4 ACUTE RENAL FAILURE SUPERIMPOSED ON STAGE 4 CHRONIC KIDNEY DISEASE, UNSPECIFIED ACUTE RENAL FAILURE TYPE (HCC): ICD-10-CM

## 2021-07-16 DIAGNOSIS — E79.0 ELEVATED BLOOD URIC ACID LEVEL: ICD-10-CM

## 2021-07-16 DIAGNOSIS — M10.079 ACUTE IDIOPATHIC GOUT OF FOOT, UNSPECIFIED LATERALITY: ICD-10-CM

## 2021-07-16 DIAGNOSIS — D69.6 THROMBOCYTOPENIA (HCC): ICD-10-CM

## 2021-07-16 PROCEDURE — 1036F TOBACCO NON-USER: CPT | Performed by: FAMILY MEDICINE

## 2021-07-16 PROCEDURE — 1090F PRES/ABSN URINE INCON ASSESS: CPT | Performed by: FAMILY MEDICINE

## 2021-07-16 PROCEDURE — G8400 PT W/DXA NO RESULTS DOC: HCPCS | Performed by: FAMILY MEDICINE

## 2021-07-16 PROCEDURE — G8427 DOCREV CUR MEDS BY ELIG CLIN: HCPCS | Performed by: FAMILY MEDICINE

## 2021-07-16 PROCEDURE — 99214 OFFICE O/P EST MOD 30 MIN: CPT | Performed by: FAMILY MEDICINE

## 2021-07-16 PROCEDURE — 1123F ACP DISCUSS/DSCN MKR DOCD: CPT | Performed by: FAMILY MEDICINE

## 2021-07-16 PROCEDURE — 4040F PNEUMOC VAC/ADMIN/RCVD: CPT | Performed by: FAMILY MEDICINE

## 2021-07-16 PROCEDURE — 3017F COLORECTAL CA SCREEN DOC REV: CPT | Performed by: FAMILY MEDICINE

## 2021-07-16 PROCEDURE — G8417 CALC BMI ABV UP PARAM F/U: HCPCS | Performed by: FAMILY MEDICINE

## 2021-07-16 RX ORDER — ALLOPURINOL 300 MG/1
300 TABLET ORAL DAILY
Qty: 90 TABLET | Refills: 3 | Status: ON HOLD | OUTPATIENT
Start: 2021-07-16 | End: 2022-01-21

## 2021-07-16 RX ORDER — FLUOXETINE HYDROCHLORIDE 40 MG/1
40 CAPSULE ORAL DAILY
Qty: 90 CAPSULE | Refills: 3 | Status: ON HOLD | OUTPATIENT
Start: 2021-07-16 | End: 2022-07-11

## 2021-07-16 SDOH — ECONOMIC STABILITY: FOOD INSECURITY: WITHIN THE PAST 12 MONTHS, YOU WORRIED THAT YOUR FOOD WOULD RUN OUT BEFORE YOU GOT MONEY TO BUY MORE.: NEVER TRUE

## 2021-07-16 SDOH — ECONOMIC STABILITY: FOOD INSECURITY: WITHIN THE PAST 12 MONTHS, THE FOOD YOU BOUGHT JUST DIDN'T LAST AND YOU DIDN'T HAVE MONEY TO GET MORE.: NEVER TRUE

## 2021-07-16 ASSESSMENT — ENCOUNTER SYMPTOMS
CONSTIPATION: 0
SHORTNESS OF BREATH: 0
BACK PAIN: 0
CHEST TIGHTNESS: 0
VOMITING: 0
DIARRHEA: 0
SORE THROAT: 0
WHEEZING: 0
EYE PAIN: 0
COUGH: 0
ABDOMINAL PAIN: 0
RHINORRHEA: 0
NAUSEA: 0
BLOOD IN STOOL: 0

## 2021-07-16 ASSESSMENT — SOCIAL DETERMINANTS OF HEALTH (SDOH): HOW HARD IS IT FOR YOU TO PAY FOR THE VERY BASICS LIKE FOOD, HOUSING, MEDICAL CARE, AND HEATING?: NOT VERY HARD

## 2021-07-16 NOTE — PROGRESS NOTES
Paul Mathis (:  1950) is a 78 y.o. female,Established patient, here for evaluation of the following chief complaint(s):  Skin Lesion (sore on bottom and review medications)         ASSESSMENT/PLAN:  1. Lesion of female perineum  -     Francoise Liu MD, General Surgery, [facility]  -unknown diagnosis/prognosis, refer to [facility] Surg for further evaluation  and treatment  2. Moderate episode of recurrent major depressive disorder (HCC)  -     FLUoxetine (PROZAC) 40 MG capsule; Take 1 capsule by mouth daily, Disp-90 capsule, R-3Normal  -stable on prozac  3. Acute renal failure superimposed on stage 4 chronic kidney disease, unspecified acute renal failure type [facility])  -stable sees nephrology,   Lab Results   Component Value Date     2021    K 3.9 2021     2021    CO2 24 2021    BUN 13 2021    CREATININE 1.0 2021    GLUCOSE 90 2021    CALCIUM 9.3 2021    PROT 6.7 2021    LABALBU 3.2 (L) 2021    BILITOT 0.4 2021    ALKPHOS 124 2021    AST 16 2021    ALT 6 (L) 2021    LABGLOM 55 (A) 2021         4. Thrombocytopenia (HCC)  -stable,   Lab Results   Component Value Date    WBC 5.7 2021    HGB 9.3 (L) 2021    HCT 32.5 (L) 2021    MCV 95.9 2021     2021       5. Elevated blood uric acid level  -     allopurinol (ZYLOPRIM) 300 MG tablet; Take 1 tablet by mouth daily, Disp-90 tablet, R-3Normal  -stable on allopurinol,   Lab Results   Component Value Date    URICACID 4.9 2020       6. Acute idiopathic gout of foot, unspecified laterality  -     allopurinol (ZYLOPRIM) 300 MG tablet; Take 1 tablet by mouth daily, Disp-90 tablet, R-3Normal      No follow-ups on file. Subjective   SUBJECTIVE/OBJECTIVE:  HPI  Pt here for a check up. Reviewed BMi of 38. Encouraged diet, exercise and weight loss. Reviewed health maintenance, needs new mammogram.  Needs refills.   Lesion on her bottom. Single, nonsmoker, pmh reviewed. Review of Systems   Constitutional: Negative for chills, fatigue, fever and unexpected weight change. HENT: Negative for congestion, ear pain, rhinorrhea and sore throat. Eyes: Negative for pain and visual disturbance. Respiratory: Negative for cough, chest tightness, shortness of breath and wheezing. Cardiovascular: Negative for chest pain and palpitations. Gastrointestinal: Negative for abdominal pain, blood in stool, constipation, diarrhea, nausea and vomiting. Genitourinary: Negative for difficulty urinating, frequency, hematuria and urgency. Musculoskeletal: Negative for back pain, joint swelling, myalgias and neck pain. Skin: Positive for wound. Negative for rash. Neurological: Negative for dizziness and headaches. Hematological: Negative for adenopathy. Does not bruise/bleed easily. Psychiatric/Behavioral: Negative for behavioral problems and sleep disturbance. The patient is not nervous/anxious. Objective   Physical Exam  Vitals and nursing note reviewed. Constitutional:       Appearance: She is well-developed. HENT:      Head: Normocephalic and atraumatic. Right Ear: External ear normal.      Left Ear: External ear normal.      Nose: Nose normal.      Mouth/Throat:      Mouth: Mucous membranes are moist.   Eyes:      Pupils: Pupils are equal, round, and reactive to light. Neck:      Thyroid: No thyromegaly. Cardiovascular:      Rate and Rhythm: Normal rate and regular rhythm. Heart sounds: Normal heart sounds. Pulmonary:      Breath sounds: Normal breath sounds. No wheezing or rales. Abdominal:      General: Bowel sounds are normal.      Palpations: Abdomen is soft. Tenderness: There is no abdominal tenderness. There is no guarding or rebound. Musculoskeletal:         General: Normal range of motion. Cervical back: Neck supple.         Legs:    Lymphadenopathy:      Cervical: No cervical

## 2021-07-16 NOTE — PATIENT INSTRUCTIONS
Patient Education        Gout: Care Instructions  Overview     Gout is a form of arthritis caused by a buildup of uric acid crystals in a joint. It causes sudden attacks of pain, swelling, redness, and stiffness, usually in one joint, especially the big toe. Gout usually comes on without a cause. But it can be brought on by drinking alcohol (especially beer), eating or drinking things made with high-fructose corn syrup, or eating seafood or red meat. Taking certain medicines, such as diuretics, can also trigger an attack of gout. Taking your medicines as prescribed and following up with your doctor regularly can help you avoid gout attacks in the future. Follow-up care is a key part of your treatment and safety. Be sure to make and go to all appointments, and call your doctor if you are having problems. It's also a good idea to know your test results and keep a list of the medicines you take. How can you care for yourself at home? · If the joint is swollen, put ice or a cold pack on the area for 10 to 20 minutes at a time. Put a thin cloth between the ice and your skin. · Prop up the sore limb on a pillow when you ice it or anytime you sit or lie down during the next 3 days. Try to keep it above the level of your heart. This can help reduce swelling. · Rest sore joints. Avoid activities that put weight or strain on the joints for a few days. Take short rest breaks from your regular activities during the day. · Take your medicines exactly as prescribed. Call your doctor if you think you are having a problem with your medicine. · Take pain medicines exactly as directed. ? If the doctor gave you a prescription medicine for pain, take it as prescribed. ? If you are not taking a prescription pain medicine, ask your doctor if you can take an over-the-counter medicine. · Eat less seafood and red meat. · Avoid foods or drinks that are made with high-fructose corn syrup.   · Check with your doctor before drinking alcohol. · Losing weight, if you are overweight, may help reduce attacks of gout. But do not go on a diet that causes rapid weight loss. Losing a lot of weight in a short amount of time can cause a gout attack. When should you call for help? Call your doctor now or seek immediate medical care if:    · You have a fever.     · The joint is so painful you cannot use it.     · You have sudden, unexplained swelling, redness, warmth, or severe pain in one or more joints. Watch closely for changes in your health, and be sure to contact your doctor if:    · You have joint pain.     · Your symptoms get worse or are not improving after 2 or 3 days. Where can you learn more? Go to https://[employer].Planning Media. org and sign in to your BlisMedia account. Enter D685 in the myEnergyPlatform.com box to learn more about \"Gout: Care Instructions. \"     If you do not have an account, please click on the \"Sign Up Now\" link. Current as of: August 5, 2020               Content Version: 12.9  © 2006-2021 [employer]. Care instructions adapted under license by [facility] ([facility]). If you have questions about a medical condition or this instruction, always ask your healthcare professional. [place] [address] any warranty or liability for your use of this information. Patient Education        Depression Treatment: Care Instructions  Your Care Instructions     Depression is a condition that affects the way you feel, think, and act. It causes symptoms such as low energy, loss of interest in daily activities, and sadness or grouchiness that goes on for a long time. Depression is very common and affects men and women of all ages. Depression is a medical illness caused by changes in the natural chemicals in your brain. It is not a character flaw, and it does not mean that you are a bad or weak person. It does not mean that you are going crazy.   It is important to know that depression can be treated. Medicines, counseling, and self-care can all help. Many people do not get help because they are embarrassed or think that they will get over the depression on their own. But some people do not get better without treatment. Follow-up care is a key part of your treatment and safety. Be sure to make and go to all appointments, and call your doctor if you are having problems. It's also a good idea to know your test results and keep a list of the medicines you take. How can you care for yourself at home? Learn about antidepressant medicines  Antidepressant medicines can improve or end the symptoms of depression. You may need to take the medicine for at least 6 months, and often longer. Keep taking your medicine even if you feel better. If you stop taking it too soon, your symptoms may come back or get worse. You may start to feel better within 1 to 3 weeks of taking antidepressant medicine. But it can take as many as 6 to 8 weeks to see more improvement. Talk to your doctor if you have problems with your medicine or if you do not notice any improvement after 3 weeks. Antidepressants can make you feel tired, dizzy, or nervous. Some people have dry mouth, constipation, headaches, sexual problems, an upset stomach, or diarrhea. Many of these side effects are mild and go away on their own after you take the medicine for a few weeks. Some may last longer. Talk to your doctor if side effects bother you too much. You might be able to try a different medicine. If you are pregnant or breastfeeding, talk to your doctor about what medicines you can take. Learn about counseling  In many cases, counseling can work as well as medicines to treat mild to moderate depression. Counseling is done by licensed mental health providers, such as psychologists, social workers, and some types of nurses. It can be done in one-on-one sessions or in a group setting. Many people find group sessions helpful.   Cognitive-behavioral therapy is a type of counseling. In this treatment, you learn how to see and change unhelpful thinking styles that may be adding to your depression. Counseling and medicines often work well when used together. When should you call for help? Call 911 anytime you think you may need emergency care. For example, call if:    · You feel you cannot stop from hurting yourself or someone else. Call your doctor now or seek immediate medical care if:    · You hear voices.     · You feel much more depressed. Watch closely for changes in your health, and be sure to contact your doctor if:    · You are having problems with your depression medicine.     · You are not getting better as expected. Where can you learn more? Go to https://MobiPixie.Chegue.lÃ¡. org and sign in to your ScreenHits account. Enter I105 in the OfferWire box to learn more about \"Depression Treatment: Care Instructions. \"     If you do not have an account, please click on the \"Sign Up Now\" link. Current as of: September 23, 2020               Content Version: 12.9  © 2006-2021 Healthwise[employer] Incorporated. Care instructions adapted under license by [facility] ([facility]). If you have questions about a medical condition or this instruction, always ask your healthcare professional. [place] [address] any warranty or liability for your use of this information.

## 2021-07-26 ENCOUNTER — TELEPHONE (OUTPATIENT)
Dept: SURGERY | Age: 71
End: 2021-07-26

## 2021-07-26 ENCOUNTER — OFFICE VISIT (OUTPATIENT)
Dept: SURGERY | Age: 71
End: 2021-07-26
Payer: MEDICARE

## 2021-07-26 VITALS
SYSTOLIC BLOOD PRESSURE: 115 MMHG | BODY MASS INDEX: 37.76 KG/M2 | HEART RATE: 82 BPM | TEMPERATURE: 97.5 F | HEIGHT: 57 IN | WEIGHT: 175 LBS | RESPIRATION RATE: 14 BRPM | OXYGEN SATURATION: 96 % | DIASTOLIC BLOOD PRESSURE: 62 MMHG

## 2021-07-26 DIAGNOSIS — I50.812 CHRONIC RIGHT-SIDED HEART FAILURE (HCC): ICD-10-CM

## 2021-07-26 DIAGNOSIS — L89.322 DECUBITUS ULCER OF ISCHIAL AREA, LEFT, STAGE II (HCC): ICD-10-CM

## 2021-07-26 DIAGNOSIS — I50.9 CONGESTIVE HEART FAILURE, UNSPECIFIED HF CHRONICITY, UNSPECIFIED HEART FAILURE TYPE (HCC): ICD-10-CM

## 2021-07-26 DIAGNOSIS — F32.A CHRONIC DEPRESSION: ICD-10-CM

## 2021-07-26 DIAGNOSIS — I27.20 PULMONARY HTN (HCC): ICD-10-CM

## 2021-07-26 DIAGNOSIS — I10 ESSENTIAL HYPERTENSION: Primary | ICD-10-CM

## 2021-07-26 PROCEDURE — 99204 OFFICE O/P NEW MOD 45 MIN: CPT | Performed by: SURGERY

## 2021-07-26 PROCEDURE — G8427 DOCREV CUR MEDS BY ELIG CLIN: HCPCS | Performed by: SURGERY

## 2021-07-26 PROCEDURE — 3017F COLORECTAL CA SCREEN DOC REV: CPT | Performed by: SURGERY

## 2021-07-26 PROCEDURE — G8417 CALC BMI ABV UP PARAM F/U: HCPCS | Performed by: SURGERY

## 2021-07-26 PROCEDURE — 1090F PRES/ABSN URINE INCON ASSESS: CPT | Performed by: SURGERY

## 2021-07-26 PROCEDURE — G8400 PT W/DXA NO RESULTS DOC: HCPCS | Performed by: SURGERY

## 2021-07-26 PROCEDURE — 1123F ACP DISCUSS/DSCN MKR DOCD: CPT | Performed by: SURGERY

## 2021-07-26 PROCEDURE — 1036F TOBACCO NON-USER: CPT | Performed by: SURGERY

## 2021-07-26 PROCEDURE — 4040F PNEUMOC VAC/ADMIN/RCVD: CPT | Performed by: SURGERY

## 2021-07-26 NOTE — TELEPHONE ENCOUNTER
Pt of Dr. Cummins Morning last seen 4/5/21 is scheduled for excision of a right buttock wound with Dr. Dalia Dale on 8/6. Can she be cleared for surgery? Pt was instructed she could remain on her ASA.

## 2021-07-26 NOTE — TELEPHONE ENCOUNTER
Pre op Risk Assessment    Procedure excision of a right buttock wound  Physician Dr. Sybil Brown  Date of surgery/procedure 8-6-21    Last OV 4-5-21  Last Stress   Last Echo 5-4-21  Last Cath 8-27-19  Last Stent   Is patient on blood thinners ASA  Hold Meds/how many days ?

## 2021-07-26 NOTE — LETTER
[address] [facility]  [place] [address] [address] [facility] [address]  Phone: [phone]  Fax: [phone]    Chanelle Luz MD        July 29, 2021    Reina Hickman MD  [address] [place] [address]    Patient: Thaddeus Mai   MR Number: [id number]   YOB: 1950   Date of Visit: 7/26/2021     Dear Reina Hickman,    Thank you for the request for consultation for Thaddeus Mai to me for the evaluation of perineal/ischial lesion. Below are the relevant portions of my assessment and plan of care. 1. Essential hypertension    2. Decubitus ulcer of ischial area, left, stage II ([address]    3. Congestive heart failure, unspecified HF chronicity, unspecified heart failure type ([address]    4. Chronic depression    5. Pulmonary HTN ([address]    6. Chronic right-sided heart failure ([address]    7. Chronic kidney disease    1. Patient will need assessment of medical stability to undergo general anesthesia. If deemed stable to undergo surgery will schedule patient for excisional debridement and likely primary closure of chronic left ischial ulcer. 2.  Discussed with patient uncertain if this tunnels but she has been debilitated spent some time in a nursing home [place] appears to represent hypertrophic tissue with the body's attempts to heal.  3.  Discussed risks of procedure which include but not limited to bleeding, infection and recurrence, more extensive undermining than expected with larger debridement as well as potential for perioperative cardiac pulmonary complications and even death. Patient expressed understanding wish to proceed. 4.  General anesthesia  5. Preoperative testing per anesthesia guidelines        If you have questions, please do not hesitate to call me. I look forward to following Carmelo Ribeiro along with you.     Sincerely,          Chanelle Luz MD

## 2021-07-26 NOTE — PROGRESS NOTES
Toshia Dias MD   General Surgery  New Patient Evaluation in Office  Pt Name: Jagjit Lau  Date of Birth 1950   Today's Date: 7/26/2021  Medical Record Number: [id number]  Referring Provider: Viktor Burnette MD  Primary Care Provider: Raheem Hoffmann MD  Chief Complaint:  Chief Complaint   Patient presents with   [employer] [facility]     new patient--ref by Dr. Alma Rosa Levin for lesion of perineum/buttocks       ASSESSMENT      1. Essential hypertension    2. Decubitus ulcer of ischial area, left, stage II ([address]    3. Congestive heart failure, unspecified HF chronicity, unspecified heart failure type ([address]    4. Chronic depression    5. Pulmonary HTN ([address]    6. Chronic right-sided heart failure ([address]    7. Chronic kidney disease   6. Morbid obesity BMI 37.87 contributing to #2  PLANS      1. Patient will need assessment of medical stability to undergo general anesthesia. If deemed stable to undergo surgery will schedule patient for excisional debridement and likely primary closure of chronic left ischial ulcer. 2.  Discussed with patient uncertain if this tunnels but she has been debilitated spent some time in a nursing home in the [facility] area appears to represent hypertrophic tissue with the body's attempts to heal.  3.  Discussed risks of procedure which include but not limited to bleeding, infection and recurrence, more extensive undermining than expected with larger debridement as well as potential for perioperative cardiac pulmonary complications and even death. Patient expressed understanding wish to proceed. 4.  General anesthesia  5. Preoperative testing per anesthesia guidelines        Ramos Bustillos is a 78y.o. year old female who is presenting today in the office for evaluation of a perineal wound. Arslan Valencia has a history of chronic illness with kidney disease and pulmonary hypertension which has stabilized with treatment. She follows with [facility] for her pulmonary hypertension.   She spent some time in an extended care facility but now is at home with assistance. She was seen recently by Dr. Richard Nugent had a fleshy wound left perineum ischial area. My opinion this probably is a chronic pressure wound which is trying to heal in the fleshy area represents hypertrophic granulation tissue. Patient denies any recent infection. She has no pain in the area there is no surrounding erythema. There is no gross muscular involvement but may extend to the muscle. Recommend surgical excision to aid in wound healing. Family feels it has been there for some time. Patient only recently was made aware she can actually see it herself. She then discussed with her family. She reports she has lost about 100 pounds with her chronic illness but also trying. She sees Dr. Diane Garcia here locally for cardiac care. Recent echocardiogram reviewed normal ejection fraction 55 to 60% with no regional ventricular wall motion abnormalities.   Grade 2 diastolic dysfunction and mild aortic stenosis with a valve area of 1.7 cm²    Past Medical History  Past Medical History:   Diagnosis Date    CH[facility]congestive heart failure) ([facility])     Dr. Nalini Andersen Depression     Gout attack     Hypertension     Osteoarthritis     Pulmonary artery hypertension ([facility]     [facility] Dr. Misael Fisher-- Dr. Nalini Andersen Renal calculi     Dr. Rebel Weems Thrombocytopenia [facility])        Past Surgical History  Past Surgical History:   Procedure Laterality Date   [address]       Medications  Current Outpatient Medications   Medication Sig Dispense Refill    allopurinol (ZYLOPRIM) 300 MG tablet Take 1 tablet by mouth daily 90 tablet 3    FLUoxetine (PROZAC) 40 MG capsule Take 1 capsule by mouth daily 90 capsule 3    potassium chloride (KLOR-CON M) 10 MEQ extended release tablet Take 1 tablet by mouth every other day 90 tablet 3    bumetanide (BUMEX) 1 MG tablet Take 1 tablet by mouth daily 60 tablet 11    metoprolol tartrate (LOPRESSOR) 25 MG tablet Take 0.5 tablets by mouth 2 times daily 90 tablet 1    Multiple Vitamins-Minerals (MULTIVITAMIN WOMEN PO) Take 1 tablet by mouth daily      sodium chloride 1 g tablet Take 1 tablet by mouth 2 times daily 120 tablet 3    acetaminophen (TYLENOL) 650 MG extended release tablet Take 650 mg by mouth every 8 hours as needed for Pain      Riociguat (ADEMPAS) 2.5 MG TABS Take 2.5 mg by mouth 3 times daily      docusate sodium (COLACE) 100 MG capsule Take 100 mg by mouth as needed       aspirin 81 MG tablet Take 81 mg by mouth daily        No current facility-administered medications for this visit.      Allergies   No Known Allergies    Family History  Family History   Problem Relation Age of Onset    Diabetes Father     Arthritis Mother    [place] Self COPD Mother     Diabetes Sister     Heart Disease Maternal Uncle     Breast Cancer Niece 36    Sleep Apnea Brother     Asthma Neg Hx     Birth Defects Neg Hx     Cancer Neg Hx     Depression Neg Hx     Early Death Neg Hx     Hearing Loss Neg Hx     High Blood Pressure Neg Hx     High Cholesterol Neg Hx     Kidney Disease Neg Hx     Learning Disabilities Neg Hx     Mental Illness Neg Hx     Mental Retardation Neg Hx     Miscarriages / Stillbirths Neg Hx     Stroke Neg Hx     Substance Abuse Neg Hx     Vision Loss Neg Hx     Other Neg Hx        SocialHistory  Social History     Socioeconomic History    Marital status: Single     Spouse name: Not on file    Number of children: 0    Years of education: Not on file    Highest education level: Not on file   Occupational History    Not on file   Tobacco Use    Smoking status: Never Smoker    Smokeless tobacco: Never Used   Vaping Use    Vaping Use: Never used   Substance and Sexual Activity    Alcohol use: No    Drug use: No    Sexual activity: Not Currently   Other Topics Concern    Not on file   Social History Narrative    Not on file     Social Determinants of Health     Financial Resource Strain: Low Risk     Difficulty of Paying Living Expenses: Not very hard   Food Insecurity: No Food Insecurity    Worried About Running Out of Food in the Last Year: Never true    Ran Out of Food in the Last Year: Never true   Transportation Needs:     Lack of Transportation (Medical):  Lack of Transportation (Non-Medical):    Physical Activity:     Days of Exercise per Week:     Minutes of Exercise per Session:    Stress:     Feeling of Stress :    Social Connections:     Frequency of Communication with Friends and Family:     Frequency of Social Gatherings with Friends and Family:     Attends Yarsani Services:     Active Member of Clubs or Organizations:     Attends Club or Organization Meetings:     Marital Status:    Intimate Partner Violence:     Fear of Current or Ex-Partner:     Emotionally Abused:     Physically Abused:     Sexually Abused:            Review of Systems  Review of Systems   Constitutional: Negative for chills, fatigue, fever and unexpected weight change. HENT: Negative for congestion and voice change. Eyes: Negative for visual disturbance. Respiratory: Positive for shortness of breath. Negative for cough and wheezing. Short of breath with activity   Cardiovascular: Negative for chest pain and palpitations. Gastrointestinal: Negative for abdominal pain, blood in stool, nausea and vomiting. Endocrine: Negative for cold intolerance, heat intolerance and polydipsia. Genitourinary: Negative for dysuria, flank pain and hematuria. Musculoskeletal: Negative for gait problem, joint swelling and myalgias. Skin: Positive for wound. Negative for color change and rash. Allergic/Immunologic: Negative for immunocompromised state. Neurological: Negative for dizziness, tremors, seizures and speech difficulty. Hematological: Does not bruise/bleed easily.    Psychiatric/Behavioral: Negative for behavioral problems, confusion and

## 2021-07-29 PROBLEM — L89.322: Status: ACTIVE | Noted: 2021-07-29

## 2021-07-29 ASSESSMENT — ENCOUNTER SYMPTOMS
NAUSEA: 0
BLOOD IN STOOL: 0
VOICE CHANGE: 0
ABDOMINAL PAIN: 0
COLOR CHANGE: 0
VOMITING: 0
COUGH: 0
SHORTNESS OF BREATH: 1
WHEEZING: 0

## 2021-08-03 ENCOUNTER — OFFICE VISIT (OUTPATIENT)
Dept: NEPHROLOGY | Age: 71
End: 2021-08-03
Payer: MEDICARE

## 2021-08-03 VITALS
WEIGHT: 173 LBS | TEMPERATURE: 97.2 F | HEART RATE: 68 BPM | OXYGEN SATURATION: 98 % | DIASTOLIC BLOOD PRESSURE: 62 MMHG | SYSTOLIC BLOOD PRESSURE: 118 MMHG | BODY MASS INDEX: 37.44 KG/M2

## 2021-08-03 DIAGNOSIS — E87.79 OTHER FLUID OVERLOAD: ICD-10-CM

## 2021-08-03 DIAGNOSIS — N18.31 STAGE 3A CHRONIC KIDNEY DISEASE (HCC): Primary | ICD-10-CM

## 2021-08-03 DIAGNOSIS — N06.9 ISOLATED PROTEINURIA WITH MORPHOLOGIC LESION: ICD-10-CM

## 2021-08-03 DIAGNOSIS — I10 ESSENTIAL HYPERTENSION: ICD-10-CM

## 2021-08-03 PROCEDURE — 3017F COLORECTAL CA SCREEN DOC REV: CPT | Performed by: INTERNAL MEDICINE

## 2021-08-03 PROCEDURE — G8417 CALC BMI ABV UP PARAM F/U: HCPCS | Performed by: INTERNAL MEDICINE

## 2021-08-03 PROCEDURE — G8400 PT W/DXA NO RESULTS DOC: HCPCS | Performed by: INTERNAL MEDICINE

## 2021-08-03 PROCEDURE — 1090F PRES/ABSN URINE INCON ASSESS: CPT | Performed by: INTERNAL MEDICINE

## 2021-08-03 PROCEDURE — 99214 OFFICE O/P EST MOD 30 MIN: CPT | Performed by: INTERNAL MEDICINE

## 2021-08-03 PROCEDURE — G8427 DOCREV CUR MEDS BY ELIG CLIN: HCPCS | Performed by: INTERNAL MEDICINE

## 2021-08-03 PROCEDURE — 4040F PNEUMOC VAC/ADMIN/RCVD: CPT | Performed by: INTERNAL MEDICINE

## 2021-08-03 PROCEDURE — 1036F TOBACCO NON-USER: CPT | Performed by: INTERNAL MEDICINE

## 2021-08-03 PROCEDURE — 1123F ACP DISCUSS/DSCN MKR DOCD: CPT | Performed by: INTERNAL MEDICINE

## 2021-08-03 RX ORDER — SODIUM CHLORIDE 1000 MG
1 TABLET, SOLUBLE MISCELLANEOUS 2 TIMES DAILY
Qty: 240 TABLET | Refills: 3 | Status: ON HOLD | OUTPATIENT
Start: 2021-08-03 | End: 2022-04-26 | Stop reason: HOSPADM

## 2021-08-03 NOTE — PROGRESS NOTES
[facility]        Outpatient Follow-Up note         8/3/2021 10:27 AM    Patient Name:   Keri Maid:    1950  Primary Care Physician:  Mckayla Urbano MD       Chief Complaint / Reason for follow-up : Follow Up of CKD / proteinuria     Interval History :  Patient seen and examined by me. Feels ok. No cp or SOB.  No leg swelling   No hospitalizations and no new meds     Past History :  Past Medical History:   Diagnosis Date    CHF [facility]congestive heart failure) ([facility])     Dr. Jazmin Rosado Depression     Gout attack     Hypertension     Osteoarthritis     Pulmonary artery hypertension ([facility]     [facility]-- Dr. Ema Garcia-- Dr. Jazmin Rosado Renal calculi     Dr. Ulysses Nicolas [facility])      Past Surgical History:   Procedure Laterality Date   [address]        Medications :     Outpatient Medications Marked as Taking for the 8/3/21 encounter (Office Visit) with Clarita Conley MD   Medication Sig Dispense Refill    sodium chloride 1 g tablet Take 1 tablet by mouth 2 times daily 240 tablet 3    allopurinol (ZYLOPRIM) 300 MG tablet Take 1 tablet by mouth daily 90 tablet 3    FLUoxetine (PROZAC) 40 MG capsule Take 1 capsule by mouth daily 90 capsule 3    potassium chloride (KLOR-CON M) 10 MEQ extended release tablet Take 1 tablet by mouth every other day 90 tablet 3    bumetanide (BUMEX) 1 MG tablet Take 1 tablet by mouth daily 60 tablet 11    metoprolol tartrate (LOPRESSOR) 25 MG tablet Take 0.5 tablets by mouth 2 times daily 90 tablet 1    Multiple Vitamins-Minerals (MULTIVITAMIN WOMEN PO) Take 1 tablet by mouth daily      acetaminophen (TYLENOL) 650 MG extended release tablet Take 650 mg by mouth every 8 hours as needed for Pain      Riociguat (ADEMPAS) 2.5 MG TABS Take 2.5 mg by mouth 3 times daily      docusate sodium (COLACE) 100 MG capsule Take 100 mg by mouth as needed       aspirin 81 MG tablet Take 81 mg by mouth daily          Vitals     /62 (Site: Right Upper Arm, Position: Sitting, Cuff Size: Medium Adult)   Pulse 68   Temp 97.2 °F (36.2 °C) (Temporal)   Wt 173 lb (78.5 kg)   SpO2 98%   BMI 37.44 kg/m²  Wt Readings from Last 3 Encounters:   08/03/21 173 lb (78.5 kg)   07/26/21 175 lb (79.4 kg)   07/16/21 175 lb 12.8 oz (79.7 kg)        Physical Exam     General -- no distress  Lungs -- clear  Heart -- S1, S2 heard, JVD - no  Abdomen - soft, non-tender  Extremities -- mild edema  CNS - awake and alert      Labs, Radiology and Tests    Labs -    10/19 12/20 6/21         Sodium 125 139 138         Potassium 4.2 3.8 3.9         BUN 25 29 13         Creatinine 0.7 1.1 1.0         eGFR 83 49 55                     UPCR 1.18  6.0         UMCR 338  2525                       Renal Ultrasound Scan -- 10/19  Right kidney 9.7 cm left kidney 11.9 cm  1.5 cm cyst on the right kidney  Left renal left renal calculi     Assessment    1. Renal -patient has CKD stage III most likely. Has some fluctuations in the creatinine. However it certainly appears to be stabilizing around 1.1. A year ago it was close to 0.7  -Volume status he is doing well continue Bumex  -Has proteinuria which has been worsening along with some RBCs in the urine as well there is definitely some glomerular pathology going on.  -As the renal function is stable we will recheck the urine protein creatinine ratio and also obtain serological work-up patient even may need a renal biopsy. 2. Electrolytes-hyponatremia improved well on salt tabs can continue for now  3. Essential hypertension-running well  4. History of congestive heart failure plan as mentioned above  5.  Proteinuria unclear etiology plan as mentioned above  6. meds reviewed and D/W patient and sister  7. FU in 3 months    Tests and orders placed this Encounter     Orders Placed This Encounter   Procedures    RYLAN Screen with Reflex    Glomerular Basement Membrane (GBM) Antibody IgG    Anti-Neutrophilic Cytoplasmic Antibody    C3 Complement    C4 Complement    [place]/Lambda Free Lt Chains, Serum Quant    Hepatitis C Antibody    Immunofixation serum profile    Electrophoresis Protein, Serum without Reflex to Immunofixation    Comprehensive Metabolic Panel    Creatinine, Random Urine    Urinalysis with Microscopic    MICROALBUMIN, RANDOM URINE (W/O CREATININE)    Protein, urine, random   Santso Sheridan M.D  [facility].

## 2021-08-06 ENCOUNTER — ANESTHESIA EVENT (OUTPATIENT)
Dept: OPERATING ROOM | Age: 71
End: 2021-08-06
Payer: MEDICARE

## 2021-08-06 ENCOUNTER — HOSPITAL ENCOUNTER (OUTPATIENT)
Age: 71
Setting detail: OUTPATIENT SURGERY
Discharge: HOME | End: 2021-08-06
Attending: SURGERY | Admitting: SURGERY
Payer: MEDICARE

## 2021-08-06 ENCOUNTER — ANESTHESIA (OUTPATIENT)
Dept: OPERATING ROOM | Age: 71
End: 2021-08-06
Payer: MEDICARE

## 2021-08-06 VITALS
HEART RATE: 74 BPM | SYSTOLIC BLOOD PRESSURE: 112 MMHG | RESPIRATION RATE: 16 BRPM | OXYGEN SATURATION: 92 % | TEMPERATURE: 97.6 F | WEIGHT: 173 LBS | BODY MASS INDEX: 37.32 KG/M2 | HEIGHT: 57 IN | DIASTOLIC BLOOD PRESSURE: 54 MMHG

## 2021-08-06 VITALS — TEMPERATURE: 97 F | OXYGEN SATURATION: 93 % | DIASTOLIC BLOOD PRESSURE: 52 MMHG | SYSTOLIC BLOOD PRESSURE: 93 MMHG

## 2021-08-06 DIAGNOSIS — L89.313 PRESSURE INJURY OF RIGHT ISCHIUM, STAGE 3 (HCC): Primary | ICD-10-CM

## 2021-08-06 LAB — POTASSIUM REFLEX MAGNESIUM: 4.2 MEQ/L (ref 3.5–5.2)

## 2021-08-06 PROCEDURE — 7100000010 HC PHASE II RECOVERY - FIRST 15 MIN: Performed by: SURGERY

## 2021-08-06 PROCEDURE — 88305 TISSUE EXAM BY PATHOLOGIST: CPT

## 2021-08-06 PROCEDURE — 3600000012 HC SURGERY LEVEL 2 ADDTL 15MIN: Performed by: SURGERY

## 2021-08-06 PROCEDURE — 36415 COLL VENOUS BLD VENIPUNCTURE: CPT

## 2021-08-06 PROCEDURE — 2500000003 HC RX 250 WO HCPCS: Performed by: REGISTERED NURSE

## 2021-08-06 PROCEDURE — 3700000001 HC ADD 15 MINUTES (ANESTHESIA): Performed by: SURGERY

## 2021-08-06 PROCEDURE — 3700000000 HC ANESTHESIA ATTENDED CARE: Performed by: SURGERY

## 2021-08-06 PROCEDURE — 6360000002 HC RX W HCPCS: Performed by: REGISTERED NURSE

## 2021-08-06 PROCEDURE — 2709999900 HC NON-CHARGEABLE SUPPLY: Performed by: SURGERY

## 2021-08-06 PROCEDURE — 7100000001 HC PACU RECOVERY - ADDTL 15 MIN: Performed by: SURGERY

## 2021-08-06 PROCEDURE — 84132 ASSAY OF SERUM POTASSIUM: CPT

## 2021-08-06 PROCEDURE — 14000 TIS TRNFR TRUNK 10 SQ CM/<: CPT | Performed by: SURGERY

## 2021-08-06 PROCEDURE — 2580000003 HC RX 258: Performed by: SURGERY

## 2021-08-06 PROCEDURE — 7100000011 HC PHASE II RECOVERY - ADDTL 15 MIN: Performed by: SURGERY

## 2021-08-06 PROCEDURE — 15002 WOUND PREP TRK/ARM/LEG: CPT | Performed by: SURGERY

## 2021-08-06 PROCEDURE — 2500000003 HC RX 250 WO HCPCS: Performed by: SURGERY

## 2021-08-06 PROCEDURE — 7100000000 HC PACU RECOVERY - FIRST 15 MIN: Performed by: SURGERY

## 2021-08-06 PROCEDURE — 2720000010 HC SURG SUPPLY STERILE: Performed by: SURGERY

## 2021-08-06 PROCEDURE — 3600000002 HC SURGERY LEVEL 2 BASE: Performed by: SURGERY

## 2021-08-06 RX ORDER — MORPHINE SULFATE 2 MG/ML
2 INJECTION, SOLUTION INTRAMUSCULAR; INTRAVENOUS
Status: DISCONTINUED | OUTPATIENT
Start: 2021-08-06 | End: 2021-08-06 | Stop reason: HOSPADM

## 2021-08-06 RX ORDER — ONDANSETRON 2 MG/ML
INJECTION INTRAMUSCULAR; INTRAVENOUS PRN
Status: DISCONTINUED | OUTPATIENT
Start: 2021-08-06 | End: 2021-08-06 | Stop reason: SDUPTHER

## 2021-08-06 RX ORDER — GLYCOPYRROLATE 1 MG/5 ML
SYRINGE (ML) INTRAVENOUS PRN
Status: DISCONTINUED | OUTPATIENT
Start: 2021-08-06 | End: 2021-08-06 | Stop reason: SDUPTHER

## 2021-08-06 RX ORDER — SODIUM CHLORIDE 0.9 % (FLUSH) 0.9 %
5-40 SYRINGE (ML) INJECTION EVERY 12 HOURS SCHEDULED
Status: DISCONTINUED | OUTPATIENT
Start: 2021-08-06 | End: 2021-08-06 | Stop reason: HOSPADM

## 2021-08-06 RX ORDER — HYDROCODONE BITARTRATE AND ACETAMINOPHEN 5; 325 MG/1; MG/1
1 TABLET ORAL EVERY 4 HOURS PRN
Qty: 18 TABLET | Refills: 0 | Status: SHIPPED | OUTPATIENT
Start: 2021-08-06 | End: 2021-08-09

## 2021-08-06 RX ORDER — ACETAMINOPHEN 325 MG/1
650 TABLET ORAL EVERY 4 HOURS PRN
Status: DISCONTINUED | OUTPATIENT
Start: 2021-08-06 | End: 2021-08-06 | Stop reason: HOSPADM

## 2021-08-06 RX ORDER — HYDROCODONE BITARTRATE AND ACETAMINOPHEN 5; 325 MG/1; MG/1
1 TABLET ORAL EVERY 4 HOURS PRN
Status: DISCONTINUED | OUTPATIENT
Start: 2021-08-06 | End: 2021-08-06 | Stop reason: HOSPADM

## 2021-08-06 RX ORDER — ROCURONIUM BROMIDE 10 MG/ML
INJECTION, SOLUTION INTRAVENOUS PRN
Status: DISCONTINUED | OUTPATIENT
Start: 2021-08-06 | End: 2021-08-06 | Stop reason: SDUPTHER

## 2021-08-06 RX ORDER — HYDROCODONE BITARTRATE AND ACETAMINOPHEN 5; 325 MG/1; MG/1
2 TABLET ORAL EVERY 4 HOURS PRN
Status: DISCONTINUED | OUTPATIENT
Start: 2021-08-06 | End: 2021-08-06 | Stop reason: HOSPADM

## 2021-08-06 RX ORDER — ONDANSETRON 2 MG/ML
4 INJECTION INTRAMUSCULAR; INTRAVENOUS EVERY 6 HOURS PRN
Status: DISCONTINUED | OUTPATIENT
Start: 2021-08-06 | End: 2021-08-06 | Stop reason: HOSPADM

## 2021-08-06 RX ORDER — SODIUM CHLORIDE 9 MG/ML
25 INJECTION, SOLUTION INTRAVENOUS PRN
Status: DISCONTINUED | OUTPATIENT
Start: 2021-08-06 | End: 2021-08-06 | Stop reason: HOSPADM

## 2021-08-06 RX ORDER — DEXAMETHASONE SODIUM PHOSPHATE 10 MG/ML
INJECTION, EMULSION INTRAMUSCULAR; INTRAVENOUS PRN
Status: DISCONTINUED | OUTPATIENT
Start: 2021-08-06 | End: 2021-08-06 | Stop reason: SDUPTHER

## 2021-08-06 RX ORDER — FENTANYL CITRATE 50 UG/ML
INJECTION, SOLUTION INTRAMUSCULAR; INTRAVENOUS PRN
Status: DISCONTINUED | OUTPATIENT
Start: 2021-08-06 | End: 2021-08-06 | Stop reason: SDUPTHER

## 2021-08-06 RX ORDER — SODIUM CHLORIDE 0.9 % (FLUSH) 0.9 %
5-40 SYRINGE (ML) INJECTION PRN
Status: DISCONTINUED | OUTPATIENT
Start: 2021-08-06 | End: 2021-08-06 | Stop reason: HOSPADM

## 2021-08-06 RX ORDER — CLINDAMYCIN PHOSPHATE 900 MG/50ML
900 INJECTION INTRAVENOUS
Status: COMPLETED | OUTPATIENT
Start: 2021-08-06 | End: 2021-08-06

## 2021-08-06 RX ORDER — MORPHINE SULFATE 2 MG/ML
4 INJECTION, SOLUTION INTRAMUSCULAR; INTRAVENOUS
Status: DISCONTINUED | OUTPATIENT
Start: 2021-08-06 | End: 2021-08-06 | Stop reason: HOSPADM

## 2021-08-06 RX ORDER — LIDOCAINE HCL/PF 100 MG/5ML
SYRINGE (ML) INJECTION PRN
Status: DISCONTINUED | OUTPATIENT
Start: 2021-08-06 | End: 2021-08-06 | Stop reason: SDUPTHER

## 2021-08-06 RX ORDER — NEOSTIGMINE METHYLSULFATE 5 MG/5 ML
SYRINGE (ML) INTRAVENOUS PRN
Status: DISCONTINUED | OUTPATIENT
Start: 2021-08-06 | End: 2021-08-06 | Stop reason: SDUPTHER

## 2021-08-06 RX ORDER — SODIUM CHLORIDE 9 MG/ML
INJECTION, SOLUTION INTRAVENOUS CONTINUOUS
Status: DISCONTINUED | OUTPATIENT
Start: 2021-08-06 | End: 2021-08-06 | Stop reason: HOSPADM

## 2021-08-06 RX ORDER — ONDANSETRON 4 MG/1
4 TABLET, ORALLY DISINTEGRATING ORAL EVERY 8 HOURS PRN
Status: DISCONTINUED | OUTPATIENT
Start: 2021-08-06 | End: 2021-08-06 | Stop reason: HOSPADM

## 2021-08-06 RX ORDER — PROPOFOL 10 MG/ML
INJECTION, EMULSION INTRAVENOUS PRN
Status: DISCONTINUED | OUTPATIENT
Start: 2021-08-06 | End: 2021-08-06 | Stop reason: SDUPTHER

## 2021-08-06 RX ADMIN — FENTANYL CITRATE 50 MCG: 50 INJECTION, SOLUTION INTRAMUSCULAR; INTRAVENOUS at 08:42

## 2021-08-06 RX ADMIN — SUGAMMADEX 200 MG: 100 INJECTION, SOLUTION INTRAVENOUS at 09:04

## 2021-08-06 RX ADMIN — Medication 4 MG: at 08:40

## 2021-08-06 RX ADMIN — Medication 0.6 MG: at 08:40

## 2021-08-06 RX ADMIN — SODIUM CHLORIDE: 9 INJECTION, SOLUTION INTRAVENOUS at 07:39

## 2021-08-06 RX ADMIN — PROPOFOL 50 MG: 10 INJECTION, EMULSION INTRAVENOUS at 08:09

## 2021-08-06 RX ADMIN — PROPOFOL 100 MG: 10 INJECTION, EMULSION INTRAVENOUS at 08:04

## 2021-08-06 RX ADMIN — Medication 40 MG: at 08:04

## 2021-08-06 RX ADMIN — DEXAMETHASONE SODIUM PHOSPHATE 8 MG: 10 INJECTION, EMULSION INTRAMUSCULAR; INTRAVENOUS at 08:10

## 2021-08-06 RX ADMIN — ROCURONIUM BROMIDE 30 MG: 10 INJECTION INTRAVENOUS at 08:04

## 2021-08-06 RX ADMIN — ONDANSETRON HYDROCHLORIDE 4 MG: 4 INJECTION, SOLUTION INTRAMUSCULAR; INTRAVENOUS at 08:10

## 2021-08-06 RX ADMIN — CLINDAMYCIN PHOSPHATE 900 MG: 900 INJECTION, SOLUTION INTRAVENOUS at 08:10

## 2021-08-06 ASSESSMENT — PULMONARY FUNCTION TESTS
PIF_VALUE: 1
PIF_VALUE: 1
PIF_VALUE: 19
PIF_VALUE: 21
PIF_VALUE: 19
PIF_VALUE: 18
PIF_VALUE: 1
PIF_VALUE: 18
PIF_VALUE: 19
PIF_VALUE: 21
PIF_VALUE: 28
PIF_VALUE: 4
PIF_VALUE: 2
PIF_VALUE: 21
PIF_VALUE: 19
PIF_VALUE: 19
PIF_VALUE: 2
PIF_VALUE: 21
PIF_VALUE: 1
PIF_VALUE: 19
PIF_VALUE: 19
PIF_VALUE: 1
PIF_VALUE: 21
PIF_VALUE: 19
PIF_VALUE: 19
PIF_VALUE: 20
PIF_VALUE: 19
PIF_VALUE: 27
PIF_VALUE: 21
PIF_VALUE: 19
PIF_VALUE: 20
PIF_VALUE: 18
PIF_VALUE: 3
PIF_VALUE: 19
PIF_VALUE: 4
PIF_VALUE: 28
PIF_VALUE: 2
PIF_VALUE: 18
PIF_VALUE: 18
PIF_VALUE: 19
PIF_VALUE: 21
PIF_VALUE: 5
PIF_VALUE: 26
PIF_VALUE: 19
PIF_VALUE: 18
PIF_VALUE: 2
PIF_VALUE: 19
PIF_VALUE: 19
PIF_VALUE: 1
PIF_VALUE: 22
PIF_VALUE: 19
PIF_VALUE: 21
PIF_VALUE: 15
PIF_VALUE: 2
PIF_VALUE: 1
PIF_VALUE: 3
PIF_VALUE: 19
PIF_VALUE: 21

## 2021-08-06 ASSESSMENT — PAIN SCALES - GENERAL
PAINLEVEL_OUTOF10: 0

## 2021-08-06 ASSESSMENT — PAIN DESCRIPTION - PAIN TYPE: TYPE: SURGICAL PAIN

## 2021-08-06 NOTE — PROGRESS NOTES
ADMITTED TO [facility] AND ORIENTED TO UNIT. SCDS ON. FALL AND ALLERGY BANDS ON. PT VERBALIZED APPROVAL FOR FIRST NAME, LAST INITIAL AND PHYSICIAN NAME ON UNIT WHITEBOARD. Viktoriya Magno, sister with the patient.

## 2021-08-06 NOTE — ANESTHESIA POSTPROCEDURE EVALUATION
Department of Anesthesiology  Postprocedure Note    Patient: Sawyer Armstrong  MRN: [id number]  YOB: 1950  Date of evaluation: 8/6/2021  Time:  10:29 AM     Procedure Summary     Date: 08/06/21 Room / [facility]: [place] [address] [facility]    Anesthesia Start: 0800 Anesthesia Stop: 0192    Procedure: EXCISION RIGHT BUTTOCK WOUND AND CLOSURE (Right ) Diagnosis: (RIGHT BUTTOCK WOUND)    Surgeons: Eva Cantrell MD Responsible Provider: Melissa Law DO    Anesthesia Type: general ASA Status: 3          Anesthesia Type: general    Michelle Phase I: Michelle Score: 9    Michelle Phase II:      Last vitals: Reviewed and per EMR flowsheets.        Anesthesia Post Evaluation    Patient location during evaluation: PACU  Patient participation: complete - patient participated  Level of consciousness: awake  Airway patency: patent  Nausea & Vomiting: no nausea  Complications: no  Cardiovascular status: hemodynamically stable  Respiratory status: acceptable  Hydration status: stable

## 2021-08-06 NOTE — PROGRESS NOTES
908 Awake and oriented on arrival to PACU , with O2 NC on , HOB elevated , denies any pain or nausea  915 O2 down to 3L NC , pt resting resp easy   920 O2 down to 2L NC  935 pt continues to deny pain or nausea pt has frequent non productive cough , states she feels like she has a tickle in her throat , lungs remains clear but diminished  938 meets criteria for discharge , transported to [facility]  in room waiting

## 2021-08-06 NOTE — PROGRESS NOTES
ADMITTED FROM PACU. FAMILY AT BEDSIDE AND CALL LIGHT WITHIN PT REACH. IV INFUSING PER PUMP WITH NO REDNESS OR SWELLING.

## 2021-08-06 NOTE — PROGRESS NOTES
PT NOTED HAVING AN OCCASIONAL DRY COUGH. PT DENIES SORE THROAT OR PHLEGM IN THROAT. DRINKING WATER WITHOUT DIFFICULTY.

## 2021-08-06 NOTE — ANESTHESIA PRE PROCEDURE
Department of Anesthesiology  Preprocedure Note       Name:  Debbie Cervantes   Age:  78 y.o.  :  1950                                          MRN:  [id number]         Date:  2021      Surgeon: Pete Francis):  Jarvis Bearden MD    Procedure: Procedure(s):  EXCISION RIGHT BUTTOCK WOUND AND CLOSURE    Medications prior to admission:   Prior to Admission medications    Medication Sig Start Date End Date Taking?  Authorizing Provider   sodium chloride 1 g tablet Take 1 tablet by mouth 2 times daily 8/3/21   Grant Cummings MD   allopurinol (ZYLOPRIM) 300 MG tablet Take 1 tablet by mouth daily 21   Aydee George MD   FLUoxetine (PROZAC) 40 MG capsule Take 1 capsule by mouth daily 21   Aydee George MD   potassium chloride (KLOR-CON M) 10 MEQ extended release tablet Take 1 tablet by mouth every other day 21   LANE Alarcon - CNP   bumetanide (BUMEX) 1 MG tablet Take 1 tablet by mouth daily 21   LANE Alarcon - CNP   metoprolol tartrate (LOPRESSOR) 25 MG tablet Take 0.5 tablets by mouth 2 times daily 21   Maia Anderson MD   Multiple Vitamins-Minerals (MULTIVITAMIN WOMEN PO) Take 1 tablet by mouth daily    Historical Provider, MD   acetaminophen (TYLENOL) 650 MG extended release tablet Take 650 mg by mouth every 8 hours as needed for Pain    Historical Provider, MD   Riociguat (ADEMPAS) 2.5 MG TABS Take 2.5 mg by mouth 3 times daily    Historical Provider, MD   docusate sodium (COLACE) 100 MG capsule Take 100 mg by mouth as needed     Historical Provider, MD   aspirin 81 MG tablet Take 81 mg by mouth daily     Historical Provider, MD       Current medications:    Current Facility-Administered Medications   Medication Dose Route Frequency Provider Last Rate Last Admin    0.9 % sodium chloride infusion   Intravenous Continuous Jarvis Bearden MD        sodium chloride flush 0.9 % injection 5-40 mL  5-40 mL Intravenous 2 times per day Jarvis Bearden MD        sodium Encounters:   08/06/21 (!) 124/58   08/03/21 118/62   07/26/21 115/62       NPO Status:                                                                                 BMI:   Wt Readings from Last 3 Encounters:   08/06/21 173 lb (78.5 kg)   08/03/21 173 lb (78.5 kg)   07/26/21 175 lb (79.4 kg)     Body mass index is 37.44 kg/m². CBC:   Lab Results   Component Value Date    WBC 5.7 05/14/2021    RBC 3.39 05/14/2021    RBC 4.15 01/09/2018    HGB 9.3 05/14/2021    HCT 32.5 05/14/2021    MCV 95.9 05/14/2021    RDW 12.9 01/09/2018     05/14/2021       CMP:   Lab Results   Component Value Date     06/11/2021    K 3.9 06/11/2021    K 4.1 01/24/2020     06/11/2021    CO2 24 06/11/2021    BUN 13 06/11/2021    CREATININE 1.0 06/11/2021    LABGLOM 55 06/11/2021    GLUCOSE 90 06/11/2021    GLUCOSE 103 01/09/2018    PROT 6.7 06/11/2021    CALCIUM 9.3 06/11/2021    BILITOT 0.4 06/11/2021    ALKPHOS 124 06/11/2021    AST 16 06/11/2021    ALT 6 06/11/2021       POC Tests: No results for input(s): POCGLU, POCNA, POCK, POCCL, POCBUN, POCHEMO, POCHCT in the last 72 hours.     Coags:   Lab Results   Component Value Date    INR 1.09 01/24/2020    APTT 38.4 01/24/2020       HCG (If Applicable): No results found for: PREGTESTUR, PREGSERUM, HCG, HCGQUANT     ABGs: No results found for: PHART, PO2ART, NGS7EYV, GDO4URH, BEART, K2OXNMPF     Type & Screen (If Applicable):  Lab Results   Component Value Date    LABRH NEG 08/27/2019       Drug/Infectious Status (If Applicable):  Lab Results   Component Value Date    HEPCAB NEGATIVE 12/21/2016       COVID-19 Screening (If Applicable): No results found for: COVID19        Anesthesia Evaluation  Patient summary reviewed and Nursing notes reviewed  Airway: Mallampati: II        Dental:          Pulmonary: breath sounds clear to auscultation                             Cardiovascular:  Exercise tolerance: good (>4 METS),   (+) hypertension:, CHF:,       ECG reviewed  Rhythm: regular  Rate: normal                    Neuro/Psych:   (+) psychiatric history:            GI/Hepatic/Renal:             Endo/Other:                     Abdominal:       Abdomen: soft. Vascular: Other Findings:             Anesthesia Plan      general     ASA 3       Induction: intravenous. MIPS: Postoperative opioids intended and Prophylactic antiemetics administered. Anesthetic plan and risks discussed with patient and sibling. Plan discussed with CRNA.                   [address] [facility],    8/6/2021

## 2021-08-06 NOTE — H&P
[address]  History and Physical Update    Pt Name: Ga Adams  MRN: [id number]  YOB: 1950  Date of evaluation: 8/6/2021    [x] I have examined the patient and reviewed the H&P/Consult and there are no changes to the patient or plans. [] I have examined the patient and reviewed the H&P/Consult and have noted the following changes:        Jeanne Meyers MD MD  [facility]   General Surgery  New Patient Evaluation in Office  Pt Name: Ga Adams  Date of Birth 1950   Today's Date: 7/26/2021  Medical Record Number: [id number]  Referring Provider: Mamta Arce MD  Primary Care Provider: Sherlyn Rincon MD  Chief Complaint:       Chief Complaint   Patient presents with   [facility] Consult       new patient--ref by Dr. Andrey Calix for lesion of perineum/buttocks         ASSESSMENT   1. Essential hypertension    2. Decubitus ulcer of ischial area, right, stage III ([address]    3. Congestive heart failure, unspecified HF chronicity, unspecified heart failure type ([address]    4. Chronic depression    5. Pulmonary HTN ([address]    6. Chronic right-sided heart failure ([address]    7. Chronic kidney disease   6. Morbid obesity BMI 37.87 contributing to #2  PLANS   1. Patient will need assessment of medical stability to undergo general anesthesia. If deemed stable to undergo surgery will schedule patient for excisional debridement and likely primary closure of chronic left ischial ulcer. 2.  Discussed with patient uncertain if this tunnels but she has been debilitated spent some time in a nursing home in the [facility] area appears to represent hypertrophic tissue with the body's attempts to heal.  3.  Discussed risks of procedure which include but not limited to bleeding, infection and recurrence, more extensive undermining than expected with larger debridement as well as potential for perioperative cardiac pulmonary complications and even death. Patient expressed understanding wish to proceed. 4.  General anesthesia  5. Preoperative testing per anesthesia guidelines         SUBJECTIVE      Latasha Zaragoza is a 78y.o. year old female who is presenting today in the office for evaluation of a perineal wound. Latasha Zaragoza has a history of chronic illness with kidney disease and pulmonary hypertension which has stabilized with treatment. She follows with [facility] for her pulmonary hypertension. She spent some time in an extended care facility but now is at home with assistance. She was seen recently by Dr. Momo Ramsay had a fleshy wound left perineum ischial area. My opinion this probably is a chronic pressure wound which is trying to heal in the fleshy area represents hypertrophic granulation tissue. Patient denies any recent infection. She has no pain in the area there is no surrounding erythema. There is no gross muscular involvement but may extend to the muscle. Recommend surgical excision to aid in wound healing. Family feels it has been there for some time. Patient only recently was made aware she can actually see it herself. She then discussed with her family. She reports she has lost about 100 pounds with her chronic illness but also trying. She sees Dr. Leno Church here locally for cardiac care. Recent echocardiogram reviewed normal ejection fraction 55 to 60% with no regional ventricular wall motion abnormalities.   Grade 2 diastolic dysfunction and mild aortic stenosis with a valve area of 1.7 cm²     Past Medical History  Past Medical History        Past Medical History:   Diagnosis Date    CHF (congestive heart failure) ([facility])       Dr. Luiza Gallego Depression      Gout attack      Hypertension      Osteoarthritis      Pulmonary artery hypertension [facility])       [facility]-- Dr. Maria Victoria Merrill-- Dr. Luiza Gallego Renal calculi       Dr. Catrina García Thrombocytopenia [facility])            Past Surgical History  Past Surgical History         Past Surgical History:   Procedure Laterality Date Te[facility] 84 FACIAL NERVE SURGERY         1989          Medications  Current Facility-Administered Medications          Current Outpatient Medications   Medication Sig Dispense Refill    allopurinol (ZYLOPRIM) 300 MG tablet Take 1 tablet by mouth daily 90 tablet 3    FLUoxetine (PROZAC) 40 MG capsule Take 1 capsule by mouth daily 90 capsule 3    potassium chloride (KLOR-CON M) 10 MEQ extended release tablet Take 1 tablet by mouth every other day 90 tablet 3    bumetanide (BUMEX) 1 MG tablet Take 1 tablet by mouth daily 60 tablet 11    metoprolol tartrate (LOPRESSOR) 25 MG tablet Take 0.5 tablets by mouth 2 times daily 90 tablet 1    Multiple Vitamins-Minerals (MULTIVITAMIN WOMEN PO) Take 1 tablet by mouth daily        sodium chloride 1 g tablet Take 1 tablet by mouth 2 times daily 120 tablet 3    acetaminophen (TYLENOL) 650 MG extended release tablet Take 650 mg by mouth every 8 hours as needed for Pain        Riociguat (ADEMPAS) 2.5 MG TABS Take 2.5 mg by mouth 3 times daily        docusate sodium (COLACE) 100 MG capsule Take 100 mg by mouth as needed         aspirin 81 MG tablet Take 81 mg by mouth daily           No current facility-administered medications for this visit.         Allergies   No Known Allergies    Family History  Family History         Family History   Problem Relation Age of Onset    Diabetes Father     [place] Favorite Arthritis Mother      COPD Mother      Diabetes Sister      Heart Disease Maternal Uncle      Breast Cancer Niece 36    Sleep Apnea Brother      Asthma Neg Hx      Birth Defects Neg Hx      Cancer Neg Hx      Depression Neg Hx      Early Death Neg Hx      Hearing Loss Neg Hx      High Blood Pressure Neg Hx      High Cholesterol Neg Hx      Kidney Disease Neg Hx      Learning Disabilities Neg Hx      Mental Illness Neg Hx      Mental Retardation Neg Hx      Miscarriages / Stillbirths Neg Hx      Stroke Neg Hx      Substance Abuse Neg Hx      Vision Loss Neg Hx      Other Neg Hx            SocialHistory  Social History               Socioeconomic History    Marital status: Single       Spouse name: Not on file    Number of children: 0    Years of education: Not on file    Highest education level: Not on file   Occupational History    Not on file   Tobacco Use    Smoking status: Never Smoker    Smokeless tobacco: Never Used   Vaping Use    Vaping Use: Never used   Substance and Sexual Activity    Alcohol use: No    Drug use: No    Sexual activity: Not Currently   Other Topics Concern    Not on file   Social History Narrative    Not on file      Social Determinants of Health          Financial Resource Strain: Low Risk     Difficulty of Paying Living Expenses: Not very hard   Food Insecurity: No Food Insecurity    Worried About Running Out of Food in the Last Year: Never true    Traci of Food in the Last Year: Never true   Transportation Needs:     Lack of Transportation (Medical):  Lack of Transportation (Non-Medical):    Physical Activity:     Days of Exercise per Week:     Minutes of Exercise per Session:    Stress:     Feeling of Stress :    Social Connections:     Frequency of Communication with Friends and Family:     Frequency of Social Gatherings with Friends and Family:     Attends Cheondoism Services:     Active Member of Clubs or Organizations:     Attends Club or Organization Meetings:     Marital Status:    Intimate Partner Violence:     Fear of Current or Ex-Partner:     Emotionally Abused:     Physically Abused:     Sexually Abused:               Review of Systems  Review of Systems   Constitutional: Negative for chills, fatigue, fever and unexpected weight change. HENT: Negative for congestion and voice change. Eyes: Negative for visual disturbance. Respiratory: Positive for shortness of breath. Negative for cough and wheezing.          Short of breath with activity   Cardiovascular: Negative for chest pain and palpitations. Gastrointestinal: Negative for abdominal pain, blood in stool, nausea and vomiting. Endocrine: Negative for cold intolerance, heat intolerance and polydipsia. Genitourinary: Negative for dysuria, flank pain and hematuria. Musculoskeletal: Negative for gait problem, joint swelling and myalgias. Skin: Positive for wound. Negative for color change and rash. Allergic/Immunologic: Negative for immunocompromised state. Neurological: Negative for dizziness, tremors, seizures and speech difficulty. Hematological: Does not bruise/bleed easily. Psychiatric/Behavioral: Negative for behavioral problems, confusion and suicidal ideas.         OBJECTIVE      /62 (Site: Right Upper Arm, Position: Sitting, Cuff Size: Medium Adult)   Pulse 82   Temp 97.5 °F (36.4 °C) (Tympanic)   Resp 14   Ht 4' 9\" (1.448 m)   Wt 175 lb (79.4 kg)   SpO2 96%   BMI 37.87 kg/m²       Physical Exam  Vitals reviewed. Constitutional:       Appearance: She is well-developed. She is obese. She is not toxic-appearing. HENT:      Head: Normocephalic and atraumatic. Nose: No congestion. Eyes:      General: No scleral icterus. Extraocular Movements: Extraocular movements intact. Pupils: Pupils are equal, round, and reactive to light. Neck:      Thyroid: No thyromegaly. Vascular: No JVD. Trachea: No tracheal deviation. Cardiovascular:      Rate and Rhythm: Normal rate and regular rhythm. Pulmonary:      Effort: No respiratory distress. Breath sounds: Normal breath sounds. No wheezing or rales. Abdominal:      General: There is no distension. Palpations: Abdomen is soft. Tenderness: There is no abdominal tenderness. Musculoskeletal:         General: Normal range of motion. Cervical back: Normal range of motion and neck supple. Lymphadenopathy:      Cervical: No cervical adenopathy. Skin:     General: Skin is warm and dry. Findings: No rash. Neurological:      Mental Status: She is alert and oriented to person, place, and time.       Cranial Nerves: No cranial nerve deficit.                Lab Results   Component Value Date     WBC 5.7 05/14/2021     HGB 9.3 (L) 05/14/2021     HCT 32.5 (L) 05/14/2021      05/14/2021     CHOL 131 06/18/2019     TRIG 66 06/18/2019     HDL 41 05/14/2021     ALT 6 (L) 06/11/2021     AST 16 06/11/2021      06/11/2021     K 3.9 06/11/2021      06/11/2021     CREATININE 1.0 06/11/2021     BUN 13 06/11/2021     CO2 24 06/11/2021     TSH 3.960 09/16/2019     INR 1.09 01/24/2020     LABMICR 235.54 06/11/2021

## 2021-08-06 NOTE — FLOWSHEET NOTE
08/06/21 0650   Encounter Summary   Services provided to: Patient and family together   Referral/Consult From: [facility] Family members  (sister)   Continue Visiting Yes  (8/6 pre surgery)   Complexity of Encounter Low   Length of Encounter 15 minutes   Routine   Type Pre-procedure   Spiritual/Mormonism   Type Spiritual support   Pre surgery contact with prayer.

## 2021-08-06 NOTE — OP NOTE
[address] [place] [address]                                OPERATIVE REPORT    PATIENT NAME: Belén PENALOZA                      :        1950  MED REC NO:   [id number]                           ROOM:  ACCOUNT NO:   [de-identified]                           ADMIT DATE: 2021  PROVIDER:     KELI Ahmadi Devora OF PROCEDURE:  2021    PREOPERATIVE DIAGNOSIS:  Ulcerated wound, right ischium, stage III. POSTOPERATIVE DIAGNOSIS:  Ulcerated wound, right ischium, stage III with  tunneling, right inferior buttock. PROCEDURE:  Excision of right ischial wound, stage III, skin,  subcutaneous fat, and muscle, 8 x 8 cm with 8 cm bilateral skin,  subcutaneous fat, tissue advancement flaps, curetting of inferior  extension, closure in multiple layers. SURGEON:  Jeanne Meyers MD    ANESTHESIA:  General.    SPECIMEN:  To pathology. ESTIMATED BLOOD LOSS:  Less than 50 mL. INDICATION:  See history and physical examination. DESCRIPTION OF PROCEDURE:  The patient was brought to the operating  suite on her cart. She was administered general anesthetic. She was  placed prone with appropriate chest rolls and padding on the operating  table. The buttock area was prepped and draped. She had hypertrophic  tissue protruding from out of the wound. With just gentle prepping,  this started to bleed. After prepping and draping with Betadine, the  area was measured. It was excised elliptically. There was an inferior  extension, which had epithelialized tissue. This was curetted out and  debrided. It did not connect to the rectum or anal canal.  It was  treated with silver nitrate. There was extension to the gluteus muscle. All the ulcerated area was debrided, skin, subcutaneous fat, and muscle. It did not extend to the bone. There was some fascia debrided. Bilateral tissue flaps were advanced, skin and subcutaneous fat. Deeper  layers of tissue were closed with interrupted 2-0 Vicryl suture. Liv  was placed into the inferior extension for bleeding control. Bleeding  points were cauterized. After several layers of 2-0 Vicryl suture were  placed, skin was closed with interrupted 2-0 vertical mattress sutures  of Prolene. Gauze dressing was applied. The patient was placed in mesh  panties in her Depends. She was transported to the recovery area in  stable condition. Goldie Oviedo M.D.    D: 08/06/2021 9:04:12       T: 08/06/2021 9:08:33     [id number]  Job#: [id number]     Doc#: [id number]    CC:  Radha Aguirre. KELI Dyson M.D.

## 2021-08-09 ENCOUNTER — TELEPHONE (OUTPATIENT)
Dept: SURGERY | Age: 71
End: 2021-08-09

## 2021-08-10 ENCOUNTER — TELEPHONE (OUTPATIENT)
Dept: SURGERY | Age: 71
End: 2021-08-10

## 2021-08-10 NOTE — TELEPHONE ENCOUNTER
Pt calling with c/o excessive serosanguineous fluid draining from wound. She denies any purulent drainage, redness, fevers or chills. Appointment made for 8/12. Would you like to see tomorrow instead if able?

## 2021-08-12 ENCOUNTER — OFFICE VISIT (OUTPATIENT)
Dept: SURGERY | Age: 71
End: 2021-08-12

## 2021-08-12 VITALS
OXYGEN SATURATION: 95 % | DIASTOLIC BLOOD PRESSURE: 58 MMHG | HEART RATE: 72 BPM | SYSTOLIC BLOOD PRESSURE: 100 MMHG | RESPIRATION RATE: 16 BRPM | WEIGHT: 170 LBS | BODY MASS INDEX: 36.79 KG/M2 | TEMPERATURE: 97.6 F

## 2021-08-12 DIAGNOSIS — G89.18 POST-OP PAIN: ICD-10-CM

## 2021-08-12 DIAGNOSIS — I27.20 PULMONARY HTN (HCC): ICD-10-CM

## 2021-08-12 DIAGNOSIS — I10 ESSENTIAL HYPERTENSION: ICD-10-CM

## 2021-08-12 DIAGNOSIS — I50.9 CONGESTIVE HEART FAILURE, UNSPECIFIED HF CHRONICITY, UNSPECIFIED HEART FAILURE TYPE (HCC): ICD-10-CM

## 2021-08-12 DIAGNOSIS — L89.313 DECUBITUS ULCER OF ISCHIAL AREA, RIGHT, STAGE III (HCC): Primary | ICD-10-CM

## 2021-08-12 DIAGNOSIS — F32.A CHRONIC DEPRESSION: ICD-10-CM

## 2021-08-12 PROCEDURE — 99024 POSTOP FOLLOW-UP VISIT: CPT | Performed by: SURGERY

## 2021-08-12 NOTE — PROGRESS NOTES
Natalee Horton MD   General Surgery  Postprocedure Evaluation in Office  Pt Name: Lacho Lujan  Date of Birth 1950   Today's Date: 8/12/2021  Medical Record Number: [id number]  Primary Care Provider: Junior Arzola MD  Chief Complaint   Patient presents with   [facility] Post-Op Check     s/p 8/6 Excision of right ischial wound, stage III, skin, subcutaneous fat, and muscle, 8 x 8 cm with 8 cm bilateral skin, subcutaneous fat, tissue advancement flaps, curetting of inferior extension, closure in multiple layers. ASSESSMENT      1. Decubitus ulcer of ischial area, right, stage III ([address]    2. Post-op pain    3. Congestive heart failure, unspecified HF chronicity, unspecified heart failure type ([address]    4. Essential hypertension    5. Pulmonary HTN ([facility]    6. Chronic depression         PLAN       1. Patient scooting on bed which is too high for her wound pulled apart and opened up. Patient will need hospital bed. 2.  Sutures removed  3. Patient will need VAC placement. 4.  Patient needs hospital bed, semielectric as she needs to make frequent changes in body position which is not feasible with her ordinary bed for movement and to alleviate pain and prevent further wound breakdown. She needs has bed height requirements to perform transfers for mobility grooming and daily living tasks. 5.  Follow-up next week for VAC placement. 6.  Referral to wound care. Mary Pickering is seen today for post-op follow-up. She had excision of a chronic decubitus ulcer which was thought to be stage II was closer to stage III at the time of surgery it tunnel deep into the right buttock. Her bed is too high at home. She has limited mobility she was scooting and pulled open the wound. It is now gaping open. There is no purulence. Sutures removed wound packed open for now pending approval of VAC. She will need hospital bed to assist in transfers she has a lower height requirement.   He is only 4 feet 9 inches tall.  Medications    Current Outpatient Medications:     sodium chloride 1 g tablet, Take 1 tablet by mouth 2 times daily, Disp: 240 tablet, Rfl: 3    allopurinol (ZYLOPRIM) 300 MG tablet, Take 1 tablet by mouth daily, Disp: 90 tablet, Rfl: 3    FLUoxetine (PROZAC) 40 MG capsule, Take 1 capsule by mouth daily, Disp: 90 capsule, Rfl: 3    potassium chloride (KLOR-CON M) 10 MEQ extended release tablet, Take 1 tablet by mouth every other day, Disp: 90 tablet, Rfl: 3    bumetanide (BUMEX) 1 MG tablet, Take 1 tablet by mouth daily, Disp: 60 tablet, Rfl: 11    metoprolol tartrate (LOPRESSOR) 25 MG tablet, Take 0.5 tablets by mouth 2 times daily, Disp: 90 tablet, Rfl: 1    Multiple Vitamins-Minerals (MULTIVITAMIN WOMEN PO), Take 1 tablet by mouth daily, Disp: , Rfl:     acetaminophen (TYLENOL) 650 MG extended release tablet, Take 650 mg by mouth every 8 hours as needed for Pain, Disp: , Rfl:     Riociguat (ADEMPAS) 2.5 MG TABS, Take 2.5 mg by mouth 3 times daily, Disp: , Rfl:     aspirin 81 MG tablet, Take 81 mg by mouth daily , Disp: , Rfl:     LORazepam (ATIVAN) 0.5 MG tablet, Take 1 tablet by mouth every 8 hours as needed for Anxiety for up to 30 days. F41.9, Disp: 20 tablet, Rfl: 0    sodium hypochlorite (DAKINS) 0.125 % SOLN external solution, Apply Dakin's moistened gauze dressings to wound twice daily and as needed. , Disp: 1 Bottle, Rfl: 2    docusate sodium (COLACE) 100 MG capsule, Take 100 mg by mouth as needed , Disp: , Rfl:     Allergies  No Known Allergies    Review of Systems  History obtained from the patient. Constitutional: Denies any fever, chills, fatigue. Wound: Open with serous drainage  Resp: Denies any cough, shortness of breath.   CV: Denies any chest pain, chronic shortness of breath    OBJECTIVE     VITALS: BP (!) 100/58 (Site: Left Upper Arm, Position: Sitting, Cuff Size: Medium Adult)   Pulse 72   Temp 97.6 °F (36.4 °C) (Infrared)   Resp 16   Wt 170 lb (77.1 kg)   SpO2 95%

## 2021-08-17 ENCOUNTER — TELEPHONE (OUTPATIENT)
Dept: SURGERY | Age: 71
End: 2021-08-17

## 2021-08-17 DIAGNOSIS — L89.322 DECUBITUS ULCER OF ISCHIAL AREA, LEFT, STAGE II (HCC): Primary | ICD-10-CM

## 2021-08-17 NOTE — TELEPHONE ENCOUNTER
Patient set up with [facility] in [place] for wound vac changes on Monday Wednesday and Friday.

## 2021-08-18 ENCOUNTER — OFFICE VISIT (OUTPATIENT)
Dept: SURGERY | Age: 71
End: 2021-08-18
Payer: MEDICARE

## 2021-08-18 DIAGNOSIS — L89.303 PRESSURE INJURY OF BUTTOCK, STAGE 3, UNSPECIFIED LATERALITY (HCC): ICD-10-CM

## 2021-08-18 PROCEDURE — 97606 NEG PRS WND THER DME>50 SQCM: CPT | Performed by: SURGERY

## 2021-08-18 PROCEDURE — 99024 POSTOP FOLLOW-UP VISIT: CPT | Performed by: SURGERY

## 2021-08-18 NOTE — PROGRESS NOTES
Ambulatory Procedure Note    Patient name: Santos Morataya  Medical Record Number: [id number]  Primary Care Physician: Dionisio Reid MD    Date of Procedure: 8/18/2021    Pre-operative Diagnosis: Ischial decubitus ulcer    Post-operative Diagnosis: Same    Procedure: VAC placement    Anesthesia: None    Surgeons/Assistants: Jennyfer/Chelle    Estimated Blood Loss: 0  ml    Complications: none immediately appreciated    Specimens: None    Procedure: In the office, the pt was placed standing by the procedure table per the patient preference. She stated she could not get on the table. Her ischial wound was open on the right it is approximately 9 x 7 cm at the mouth by 7 cm deep and tunnels inferiorly. The with coiled sponge was coiled and placed into the wound. The outer protective skin barrier was placed on the outer skin edge of the wound above the anus. The adherent drape was then placed over the sponge. An opening was made in the drape over the sponge and the VAC tubing was placed and covered to seal and placed to 125mmHg suction. Patient tolerated well. She has home health set up to change the [facility] every Monday Wednesday Friday and sees wound care next Tuesday.       Juan C Gonzalez MD

## 2021-08-20 ENCOUNTER — TELEPHONE (OUTPATIENT)
Dept: SURGERY | Age: 71
End: 2021-08-20

## 2021-08-20 NOTE — TELEPHONE ENCOUNTER
[address] calling with concerns that when she changed the wound vac there was quite a bit of bleeding and was dripping on the floor. Per nurse, it was blood not serosanguinous fluid. She was able to get the vac back on but is wondering if the bleeding is ok. She states the patient feels fine, not light headed or weak. Advised nurse to talk to family about watching patient and making sure the canister of wound vac is not having to be changed frequently if drainage is blood. Venus Will also asking about a specialty mattress that can be used on the patient's bed. Pt is seeing wound clinic 8/24 and will let them determine appropriate mattress.

## 2021-08-23 NOTE — TELEPHONE ENCOUNTER
Spoke with Clinton Gresham at [facility] and [facility] started as per Medicare guidelines for group 2 mattress . Leeanne Gilmore will send all appropriate documentation to fax to [facility]. Spoke with Dr Dalia Dale continue current regimen.

## 2021-08-24 ENCOUNTER — HOSPITAL ENCOUNTER (OUTPATIENT)
Dept: WOUND CARE | Age: 71
Discharge: HOME | End: 2021-08-24
Payer: MEDICARE

## 2021-08-24 VITALS
TEMPERATURE: 98.1 F | RESPIRATION RATE: 18 BRPM | DIASTOLIC BLOOD PRESSURE: 56 MMHG | HEART RATE: 82 BPM | SYSTOLIC BLOOD PRESSURE: 103 MMHG | OXYGEN SATURATION: 93 %

## 2021-08-24 DIAGNOSIS — L89.313 PRESSURE ULCER OF RIGHT BUTTOCK, STAGE 3 (HCC): ICD-10-CM

## 2021-08-24 DIAGNOSIS — L89.310 UNSTAGEABLE PRESSURE ULCER OF RIGHT BUTTOCK (HCC): Primary | ICD-10-CM

## 2021-08-24 DIAGNOSIS — R32 URINARY INCONTINENCE IN FEMALE: ICD-10-CM

## 2021-08-24 PROCEDURE — 99213 OFFICE O/P EST LOW 20 MIN: CPT

## 2021-08-24 PROCEDURE — 99214 OFFICE O/P EST MOD 30 MIN: CPT | Performed by: NURSE PRACTITIONER

## 2021-08-24 RX ORDER — GINSENG 100 MG
CAPSULE ORAL ONCE
Status: CANCELLED | OUTPATIENT
Start: 2021-08-24 | End: 2021-08-24

## 2021-08-24 RX ORDER — CLOBETASOL PROPIONATE 0.5 MG/G
OINTMENT TOPICAL ONCE
Status: CANCELLED | OUTPATIENT
Start: 2021-08-24 | End: 2021-08-24

## 2021-08-24 RX ORDER — LIDOCAINE HYDROCHLORIDE 20 MG/ML
JELLY TOPICAL ONCE
Status: CANCELLED | OUTPATIENT
Start: 2021-08-24 | End: 2021-08-24

## 2021-08-24 RX ORDER — LIDOCAINE 50 MG/G
OINTMENT TOPICAL ONCE
Status: CANCELLED | OUTPATIENT
Start: 2021-08-24 | End: 2021-08-24

## 2021-08-24 RX ORDER — SODIUM HYPOCHLORITE 1.25 MG/ML
SOLUTION TOPICAL
Qty: 1 BOTTLE | Refills: 2 | Status: SHIPPED | OUTPATIENT
Start: 2021-08-24

## 2021-08-24 RX ORDER — BETAMETHASONE DIPROPIONATE 0.05 %
OINTMENT (GRAM) TOPICAL ONCE
Status: CANCELLED | OUTPATIENT
Start: 2021-08-24 | End: 2021-08-24

## 2021-08-24 RX ORDER — GENTAMICIN SULFATE 1 MG/G
OINTMENT TOPICAL ONCE
Status: CANCELLED | OUTPATIENT
Start: 2021-08-24 | End: 2021-08-24

## 2021-08-24 RX ORDER — LIDOCAINE HYDROCHLORIDE 40 MG/ML
SOLUTION TOPICAL ONCE
Status: CANCELLED | OUTPATIENT
Start: 2021-08-24 | End: 2021-08-24

## 2021-08-24 RX ORDER — BACITRACIN, NEOMYCIN, POLYMYXIN B 400; 3.5; 5 [USP'U]/G; MG/G; [USP'U]/G
OINTMENT TOPICAL ONCE
Status: CANCELLED | OUTPATIENT
Start: 2021-08-24 | End: 2021-08-24

## 2021-08-24 RX ORDER — BACITRACIN ZINC AND POLYMYXIN B SULFATE 500; 1000 [USP'U]/G; [USP'U]/G
OINTMENT TOPICAL ONCE
Status: CANCELLED | OUTPATIENT
Start: 2021-08-24 | End: 2021-08-24

## 2021-08-24 RX ORDER — LIDOCAINE 40 MG/G
CREAM TOPICAL ONCE
Status: CANCELLED | OUTPATIENT
Start: 2021-08-24 | End: 2021-08-24

## 2021-08-24 ASSESSMENT — PAIN SCALES - GENERAL: PAINLEVEL_OUTOF10: 4

## 2021-08-24 ASSESSMENT — PAIN DESCRIPTION - PAIN TYPE: TYPE: SURGICAL PAIN

## 2021-08-24 ASSESSMENT — PAIN DESCRIPTION - ORIENTATION: ORIENTATION: RIGHT

## 2021-08-24 ASSESSMENT — PAIN DESCRIPTION - LOCATION: LOCATION: BUTTOCKS

## 2021-08-24 NOTE — PROGRESS NOTES
[facility] and Procedure Note      Donnald Heimlich  MEDICAL RECORD NUMBER:  [id number]  AGE: 78 y.o. GENDER: female  : 1950  EPISODE DATE:  2021    SUBJECTIVE:     Chief Complaint   Patient presents with    Wound Check     Right buttock         HISTORY OF PRESENT ILLNESS      Donnald Heimlich is a 78 y.o. female who presents today for evaluation of pressure injury to right buttock. Patient is s/p surgical excision of wound, 21, per Dr. Pancho Schmidt. Current wound care includes use of negative pressure wound therapy. Hospital bed has been ordered per Dr. Pancho Schmidt for pressure offloading. Patient is current with [address] in [place] for wound care needs. Patient's family member, present today, states that wound vac frequently alarms and has not maintained a good seal, particularly at night. She attributes some of this to patient's urinary incontinence as she frequently is wet. Patient does wear Depend's at home. Patient continues to live a sedentary lifestyle with frequent pressure to wound. She currently lives with her son who has been working with patient to slowly increase step count daily, currently up to around 3000 daily. She has been drinking 2 protein drinks daily as recommended by Dr. Pancho Schmidt. She denies any further needs or concerns.     PAST MEDICAL HISTORY             Diagnosis Date    CHF (congestive heart failure) ([facility])     Dr. Graham Werner Depression     Gout attack     Hypertension     Osteoarthritis     Pulmonary artery hypertension ([address]     [facility]-- Dr. Pacheco Samano-- Dr. Graham Werner Renal calculi     Dr. Lata Hall Thrombocytopenia [facility])        PAST SURGICAL HISTORY     Past Surgical History:   Procedure Laterality Date    APPENDECTOMY      FACIAL NERVE SURGERY          PRESSURE ULCER DEBRIDEMENT Right 2021    EXCISION RIGHT BUTTOCK WOUND AND CLOSURE performed by Betty Florez MD at [address]     Family History   Problem Relation Age current facility-administered medications on file prior to encounter. REVIEW OF SYSTEMS:     Constitutional: Denies fever, chills, night sweats, fatigue, weight loss/gain, loss of appetite   Head: Denies headache,  dizziness, loss of consciousness  Respiratory: Denies shortness of breath, cough, wheezing, dyspnea with exertion  Cardiovascular:Denies chest pain, palpitations, edema  Gastrointestinal: Denies nausea, vomiting, constipation, diarrhea, abdominal pain   DARÍO: +urinary incontinence Denies dysuria, frequency, urgency, hematuria  Musculoskeletal: +Generalized muscle weakness, use of walker as ambulatory aid  Endocrine: Denies polyuria, polydipsia, cold or heat intolerance  Hematology: Denies easy brusing or bleeding, hx of clotting disorder  Dermatology: +wound Denies skin rash, eczema, pruritis    PHYSICAL EXAM:     BP (!) 103/56   Pulse 82   Temp 98.1 °F (36.7 °C) (Infrared)   Resp 18   SpO2 93%   Wt Readings from Last 3 Encounters:   08/12/21 170 lb (77.1 kg)   08/06/21 173 lb (78.5 kg)   08/03/21 173 lb (78.5 kg)       General:  Awake, alert, no apparent distress. Chronically ill appearing  HEENT: conjuctivae are clear without exudate or hemorrhage, anicteric sclera, moist oral mucosa. Chest:  Respirations regular, non-labored. Chest rise and fall equal bilaterally. Neurological: Awake, alert  Psychiatric:  Withdrawn  Extremities: non-traumatic in appearance. Skin:  Warm and dry  Wound:    Right buttock/gluteal cleft wound bed granular with large amounts of slough. Moderate amounts of serosanguinous drainage present. Periwound macerated. No redness, warmth or induration. Wound 09/16/19  Inner bleeding, excoriation (Active)   Number of days: 707       Wound 08/24/21 Sacrum (Active)   Wound Image   08/24/21 1327   Wound Etiology Surgical 08/24/21 1327   Dressing Status Intact; New dressing applied 08/24/21 1327   Wound Cleansed Cleansed with saline 08/24/21 1327   Dressing/Treatment Moisten with saline;Gauze dressing/dressing sponge;ABD;Zinc paste 08/24/21 1327   Offloading for Diabetic Foot Ulcers No offloading required 08/24/21 1327   Wound Length (cm) 7.5 cm 08/24/21 1327   Wound Width (cm) 3.5 cm 08/24/21 1327   Wound Depth (cm) 3.8 cm 08/24/21 1327   Wound Surface Area (cm^2) 26.25 cm^2 08/24/21 1327   Wound Volume (cm^3) 99.75 cm^3 08/24/21 1327   Distance Tunneling (cm) 2.2 cm 08/24/21 1327   Tunneling Position ___ O'Clock 12 08/24/21 1327   Wound Assessment Granulation tissue;Slough 08/24/21 1327   Drainage Amount Moderate 08/24/21 1327   Drainage Description Serosanguinous 08/24/21 1327   Odor None 08/24/21 1327   Katey-wound Assessment Dry/flaky; Maceration 08/24/21 1327   Margins Attached edges; Unattached edges 08/24/21 1327   Wound Thickness Description not for Pressure Injury Full thickness 08/24/21 1327   Number of days: 0         LABS/IMAGING       UA: No results for input(s): Giorgi Oleg, COLORU, CLARITYU, MUCUS, PROTEINU, BLOODU, RBCUA, WBCUA, BACTERIA, NITRU, GLUCOSEU, BILIRUBINUR, UROBILINOGEN, KETUA, LABCAST, LABCASTTY, AMORPHOS in the last 72 hours.     Invalid input(s): CRYSTALS  Micro: No results found for: BC    ASSESSMENT     -Stage 3 pressure injury, right buttock    Ulcer Identification:  Ulcer Type: pressure  Contributing Factors: chronic pressure, decreased mobility, shear force, obesity and incontinence of urine    Depth of Diabetic/Pressure/Non Pressure Ulcers or Wound:  Pressure ulcer, Stage 3     Patient Active Problem List   Diagnosis Code    HTN (hypertension) I10    Chronic depression F32.9    CHF [facility]congestive heart failure) ([facility]) I50.9    Thrombocytopenia (HCC) D69.6    Moderate episode of recurrent major depressive disorder ([facility]) F33.1    Renal failure syndrome N19    Hyperkalemia E87.5    Isolated non-nephrotic proteinuria R80.0    ALVIN (acute kidney injury) ([facility] N17.9    Chronic right-sided heart failure (HCC) I50.812    Pulmonary HTN ([facility] I27.20    Hypotension due to hypovolemia I95.89, E86.1    Acute renal failure superimposed on stage 4 chronic kidney disease (HCC) N17.9, N18.4    Pressure ulcer of right buttock, stage 3 ([address] L89.313       PLAN     Patient examined and evaluated. All available lab work, radiology studies, and progress notes pertaining to Justino Begun reviewed prior to or during patient visit today.    -Stage 3 pressure injury, right buttock- Wound bed has large amounts of slough and pale granulation tissue. Periwound macerated, with increased skin breakdown per report of family at visit today. Will give 2 week vacation from negative pressure wound therapy as they have been unable to maintain a seal due to difficult location and frequent saturation with urine. Start Dakin's moistened gauze packing, twice daily. Prescription sent for Dakin's today. Patient family states that she lives nearby, is able to complete additional dressing changes that Nikki Rodriguez would be unable to complete. Extensive discussion today on the importance of offloading wound to promote healing. Wound will not heal and may continue to worsen with ongoing pressure. Hospital bed is not a substitute for turning and repositioning. Recommended turning every 2 hours while in bed, limit time up in chair to 1 hour intervals. Increase activity as tolerated. Currently patient is using hospital bed with regular mattress. DME placed for low air loss mattress for pressure offloading capabilities. -Urinary incontinence- Discussed increased risk of friction and shearing injury to skin with moist/oversaturated skin. Change briefs promptly and frequently with any incontinence. Use disposable under pad at night, leaving briefs off, if able. -Discussed importance of well-balanced diet with adequate protein intake to promote wound healing.   Continue protein drinks twice daily as initiated by Dr. Huong Reyes.    -No indications of infection to wound at today's visit. Education provided on signs and symptoms of infection. Call clinic or seek emergency care should these occur. Follow up 2 weeks. Call clinic with any needs or concerns prior to scheduled visit. All questions and concerns addressed prior to discharge from today's visit. Please see attached Discharge Instructions    Written patient dismissal instructions given to patient and signed by patient or POA. Treatment:   Orders Placed This Encounter   Procedures    Misc nursing order (specify)     Visit Discharge/Physician Orders:  -Turn and reposition every 1-2 hours. Keep pressure off of wound area. -No more than 1 hour at a time up and sitting.   -Get up and moving frequently. -Use offloading cushion in chair.  -Need low air-loss mattress for hospital bed.   -Make sure you are eating enough protein in diet to help with wound healing.  -Prescription for Dakins solution sent into pharmacy. [facility]    Wound Location: Right buttock    Dressing orders:     1) Gather wound care supplies and arrange on clean table. 2) Wash your hands with soap and water or use alcohol based hand  for 20 seconds (sing \"Happy Birthday\" twice). 3) Cleanse wounds with normal saline or wound cleanser and gauze. Pat dry with clean gauze. 4) Right buttock- Apply zinc oxide cream (diaper rash cream) to skin around wound to protect it. Pack wound with Dakins moistened gauze. Cover with gauze and ABD pad. Secure with pink hy-tape. Change twice daily and as needed if soiled. Keep all dressings clean & dry. Do not shower, take baths or get wound wet, unless otherwise instructed by your Wound Care doctor.      Follow up visit:   2 Weeks on Wednesday September 8th at 2:00 pm     Standing Status:   Standing     Number of Occurrences:   1    DME Order for (Specify) as OP     - DME device ordered - Low air loss mattress   - Diagnosis: L89.310;   - Length of Need: Lifetime     I spent a total of 35 minutes reviewing previous notes, test results and face to face with Quincy Bennett  on 8/24/2021. Greater than 50% of this time was spent on counseling, reviewing and discussing test results, answering questions, instructions on treatment and/or medications ordered, and coordinating the care based on my plan and assessment as noted. Discharge Instructions         Discharge Instructions       Visit Discharge/Physician Orders:  -Turn and reposition every 1-2 hours. Keep pressure off of wound area. -Get up and moving frequently. -Use offloading cushion in chair.  -Need low air-loss mattress for hospital bed.   -Make sure you are eating enough protein in diet to help with wound healing.  -Prescription for Dakins solution sent into pharmacy. [facility]    Wound Location: Right buttock    Dressing orders:     1) Gather wound care supplies and arrange on clean table. 2) Wash your hands with soap and water or use alcohol based hand  for 20 seconds (sing \"Happy Birthday\" twice). 3) Cleanse wounds with normal saline or wound cleanser and gauze. Pat dry with clean gauze. 4) Right buttock- Apply zinc oxide cream (diaper rash cream) to skin around wound to protect it. Pack wound with Dakins moistened gauze. Cover with gauze and ABD pad. Secure with pink hy-tape. Change twice daily and as needed if soiled. Keep all dressings clean & dry. Do not shower, take baths or get wound wet, unless otherwise instructed by your Wound Care doctor. Follow up visit:   2 Weeks on Wednesday September 8th at 2:00 pm    Keep next scheduled appointment. Please give 24 hour notice if unable to keep appointment. [phone]    If you experience any of the following, please call the Wound Care Service during business hours: Monday through Friday 8:00 am - 4:30 pm  ([phone]).    *Increase in pain   *Temperature over 101   *Increase in drainage from your wound or a foul odor   *Uncontrolled swelling   *Need for compression bandage changes due to slippage, breakthrough drainage    If you need medical attention outside of business hours, please contact your Primary Care Doctor or go to the nearest emergency room.                    Electronically signed by LANE Mccray CNP on 8/24/2021 at 2:12 PM

## 2021-08-24 NOTE — PLAN OF CARE
Problem: Wound:  Goal: Skin integrity intact  Outcome: Ongoing   Patient presents to wound clinic for evaluation and treatment of sacrum wound. DME order for low air-loss mattress for hospital bed faxed to [address]. Prescription for Dakins solution sent into pharmacy. Discharge instructions/dressing orders faxed to [address] [place] [address]. Sacrum- Apply zinc oxide cream (diaper rash cream) to skin around wound to protect it. Pack wound with Dakins moistened gauze. Cover with gauze and ABD pad. Secure with pink hy-tape. Change twice daily and as needed if soiled. Follow up visit:   2 Weeks on Wednesday September 8th at 2:00 pm.    Care plan reviewed with patient and caregiver. Patient and caregiver verbalize understanding of the plan of care and contribute to goal setting.

## 2021-08-27 ENCOUNTER — TELEPHONE (OUTPATIENT)
Dept: CARDIOLOGY CLINIC | Age: 71
End: 2021-08-27

## 2021-08-27 NOTE — TELEPHONE ENCOUNTER
Pt had recent sx with dr bearden, and since it seems like she is hard to catch breath, been having panic attacks, they are unsure what is causing all this, also pt had a dream she was dying, and is not sure if some is due to that. They will also call PCP for guidance, any recommendations?

## 2021-08-30 ENCOUNTER — VIRTUAL VISIT (OUTPATIENT)
Dept: FAMILY MEDICINE CLINIC | Age: 71
End: 2021-08-30
Payer: MEDICARE

## 2021-08-30 DIAGNOSIS — F41.9 ANXIETY: Primary | ICD-10-CM

## 2021-08-30 PROCEDURE — G8427 DOCREV CUR MEDS BY ELIG CLIN: HCPCS | Performed by: FAMILY MEDICINE

## 2021-08-30 PROCEDURE — 4040F PNEUMOC VAC/ADMIN/RCVD: CPT | Performed by: FAMILY MEDICINE

## 2021-08-30 PROCEDURE — 99213 OFFICE O/P EST LOW 20 MIN: CPT | Performed by: FAMILY MEDICINE

## 2021-08-30 PROCEDURE — G8400 PT W/DXA NO RESULTS DOC: HCPCS | Performed by: FAMILY MEDICINE

## 2021-08-30 PROCEDURE — 3017F COLORECTAL CA SCREEN DOC REV: CPT | Performed by: FAMILY MEDICINE

## 2021-08-30 PROCEDURE — 1090F PRES/ABSN URINE INCON ASSESS: CPT | Performed by: FAMILY MEDICINE

## 2021-08-30 PROCEDURE — 1123F ACP DISCUSS/DSCN MKR DOCD: CPT | Performed by: FAMILY MEDICINE

## 2021-08-30 RX ORDER — LORAZEPAM 0.5 MG/1
0.5 TABLET ORAL EVERY 8 HOURS PRN
Qty: 20 TABLET | Refills: 0 | Status: SHIPPED | OUTPATIENT
Start: 2021-08-30 | End: 2021-09-29

## 2021-08-30 ASSESSMENT — ENCOUNTER SYMPTOMS
CONSTIPATION: 0
BLOOD IN STOOL: 0
SORE THROAT: 0
VOMITING: 0
CHEST TIGHTNESS: 0
DIARRHEA: 0
SHORTNESS OF BREATH: 1
ABDOMINAL PAIN: 0
WHEEZING: 0
COUGH: 0
BACK PAIN: 0
NAUSEA: 0
RHINORRHEA: 0
EYE PAIN: 0

## 2021-08-30 ASSESSMENT — PATIENT HEALTH QUESTIONNAIRE - PHQ9
SUM OF ALL RESPONSES TO PHQ QUESTIONS 1-9: 0
SUM OF ALL RESPONSES TO PHQ9 QUESTIONS 1 & 2: 0
SUM OF ALL RESPONSES TO PHQ QUESTIONS 1-9: 0
1. LITTLE INTEREST OR PLEASURE IN DOING THINGS: 0
2. FEELING DOWN, DEPRESSED OR HOPELESS: 0
SUM OF ALL RESPONSES TO PHQ QUESTIONS 1-9: 0

## 2021-08-30 NOTE — TELEPHONE ENCOUNTER
See if Neph can help remove more fluid  She should let her [facility] cardiologists know  PCP should treat panic/anxiety

## 2021-08-30 NOTE — PROGRESS NOTES
Arsh May (:  1950) is a 78 y.o. female,Established patient, here for evaluation of the following chief complaint(s): Anxiety         ASSESSMENT/PLAN:  1. Anxiety  -     LORazepam (ATIVAN) 0.5 MG tablet; Take 1 tablet by mouth every 8 hours as needed for Anxiety for up to 30 days. F41.9, Disp-20 tablet, R-0Normal  -monitor sxs, call if not improving  Controlled Substance Monitoring:    Acute and Chronic Pain Monitoring:   RX Monitoring 2021   Periodic Controlled Substance Monitoring Possible medication side effects, risk of tolerance/dependence & alternative treatments discussed. ;No signs of potential drug abuse or diversion identified. No follow-ups on file. SUBJECTIVE/OBJECTIVE:  HPI  Pt seen today due to anxiety. Had surgery on 2021 on right buttock ulcer. Had a wound vac. Is having nightmares, anxious sob. Requesting something for her nerves. Is on prozac 40 mg daily. Pmh reviewed, non smoker. Review of Systems   Constitutional: Negative for chills, fatigue, fever and unexpected weight change. HENT: Negative for congestion, ear pain, rhinorrhea and sore throat. Eyes: Negative for pain and visual disturbance. Respiratory: Positive for shortness of breath. Negative for cough, chest tightness and wheezing. Cardiovascular: Negative for chest pain and palpitations. Gastrointestinal: Negative for abdominal pain, blood in stool, constipation, diarrhea, nausea and vomiting. Genitourinary: Negative for difficulty urinating, frequency, hematuria and urgency. Musculoskeletal: Negative for back pain, joint swelling, myalgias and neck pain. Skin: Negative for rash. Neurological: Negative for dizziness and headaches. Hematological: Negative for adenopathy. Does not bruise/bleed easily. Psychiatric/Behavioral: Positive for sleep disturbance. Negative for behavioral problems. The patient is nervous/anxious.         Patient-Reported Vitals 2020 Patient-Reported Weight 193 lbs   Patient-Reported Systolic 97   Patient-Reported Diastolic 56   Patient-Reported Pulse 79        Physical Exam  Constitutional:       General: She is not in acute distress. HENT:      Head: Normocephalic and atraumatic. Right Ear: External ear normal.      Left Ear: External ear normal.   Eyes:      General: No scleral icterus. Right eye: No discharge. Left eye: No discharge. Pulmonary:      Effort: Pulmonary effort is normal. No respiratory distress. Musculoskeletal:      Cervical back: Normal range of motion. Skin:     Findings: No rash. Neurological:      Mental Status: She is alert and oriented to person, place, and time. Psychiatric:         Mood and Affect: Mood normal.         Behavior: Behavior normal.                   Anay Christianson, was evaluated through a synchronous (real-time) audio-video encounter. The patient (or guardian if applicable) is aware that this is a billable service. Verbal consent to proceed has been obtained within the past 12 months. The visit was conducted pursuant to the emergency declaration under the [address] [place] [address] authority and the [employer] Act. Patient identification was verified, and a caregiver was present when appropriate. The patient was located in a state where the provider was credentialed to provide care. An electronic signature was used to authenticate this note.     --Caroline Arellano MD

## 2021-08-30 NOTE — PATIENT INSTRUCTIONS
Patient Education        Anxiety Disorder: Care Instructions  Your Care Instructions     Anxiety is a normal reaction to stress. Difficult situations can cause you to have symptoms such as sweaty palms and a nervous feeling. In an anxiety disorder, the symptoms are far more severe. Constant worry, muscle tension, trouble sleeping, nausea and diarrhea, and other symptoms can make normal daily activities difficult or impossible. These symptoms may occur for no reason, and they can affect your work, school, or social life. Medicines, counseling, and self-care can all help. Follow-up care is a key part of your treatment and safety. Be sure to make and go to all appointments, and call your doctor if you are having problems. It's also a good idea to know your test results and keep a list of the medicines you take. How can you care for yourself at home? · Take medicines exactly as directed. Call your doctor if you think you are having a problem with your medicine. · Go to your counseling sessions and follow-up appointments. · Recognize and accept your anxiety. Then, when you are in a situation that makes you anxious, say to yourself, \"This is not an emergency. I feel uncomfortable, but I am not in danger. I can keep going even if I feel anxious. \"  · Be kind to your body:  ? Relieve tension with exercise or a massage. ? Get enough rest.  ? Avoid alcohol, caffeine, nicotine, and illegal drugs. They can increase your anxiety level and cause sleep problems. ? Learn and do relaxation techniques. See below for more about these techniques. · Engage your mind. Get out and do something you enjoy. Go to a funny movie, or take a walk or hike. Plan your day. Having too much or too little to do can make you anxious. · Keep a record of your symptoms. Discuss your fears with a good friend or family member, or join a support group for people with similar problems. Talking to others sometimes relieves stress.   · Get involved in social groups, or volunteer to help others. Being alone sometimes makes things seem worse than they are. · Get at least 30 minutes of exercise on most days of the week to relieve stress. Walking is a good choice. You also may want to do other activities, such as running, swimming, cycling, or playing tennis or team sports. Relaxation techniques  Do relaxation exercises 10 to 20 minutes a day. You can play soothing, relaxing music while you do them, if you wish. · Tell others in your house that you are going to do your relaxation exercises. Ask them not to disturb you. · Find a comfortable place, away from all distractions and noise. · Lie down on your back, or sit with your back straight. · Focus on your breathing. Make it slow and steady. · Breathe in through your nose. Breathe out through either your nose or mouth. · Breathe deeply, filling up the area between your navel and your rib cage. Breathe so that your belly goes up and down. · Do not hold your breath. · Breathe like this for 5 to 10 minutes. Notice the feeling of calmness throughout your whole body. As you continue to breathe slowly and deeply, relax by doing the following for another 5 to 10 minutes:  · Tighten and relax each muscle group in your body. You can begin at your toes and work your way up to your head. · Imagine your muscle groups relaxing and becoming heavy. · Empty your mind of all thoughts. · Let yourself relax more and more deeply. · Become aware of the state of calmness that surrounds you. · When your relaxation time is over, you can bring yourself back to alertness by moving your fingers and toes and then your hands and feet and then stretching and moving your entire body. Sometimes people fall asleep during relaxation, but they usually wake up shortly afterward. · Always give yourself time to return to full alertness before you drive a car or do anything that might cause an accident if you are not fully alert.  Never play a relaxation tape while you drive a car. When should you call for help? Call 911 anytime you think you may need emergency care. For example, call if:    · You feel you cannot stop from hurting yourself or someone else. Keep the numbers for these national suicide hotlines: 8-791-912-TALK (3-376.906.8487) and 8-146-YUUZFMM (2-788.863.9562). If you or someone you know talks about suicide or feeling hopeless, get help right away. Watch closely for changes in your health, and be sure to contact your doctor if:    · You have anxiety or fear that affects your life.     · You have symptoms of anxiety that are new or different from those you had before. Where can you learn more? Go to https://EX[employer]. org and sign in to your [employer] account. Enter P754 in the [employer] box to learn more about \"Anxiety Disorder: Care Instructions. \"     If you do not have an account, please click on the \"Sign Up Now\" link. Current as of: September 23, 2020               Content Version: 12.9  © 2006-2021 [employer]wise, [employer]. Care instructions adapted under license by [facility] [place] ([facility]). If you have questions about a medical condition or this instruction, always ask your healthcare professional. [address] any warranty or liability for your use of this information.

## 2021-09-08 ENCOUNTER — HOSPITAL ENCOUNTER (OUTPATIENT)
Dept: WOUND CARE | Age: 71
Discharge: HOME | End: 2021-09-08
Payer: MEDICARE

## 2021-09-08 VITALS
HEART RATE: 79 BPM | SYSTOLIC BLOOD PRESSURE: 114 MMHG | TEMPERATURE: 96.6 F | DIASTOLIC BLOOD PRESSURE: 56 MMHG | RESPIRATION RATE: 18 BRPM | OXYGEN SATURATION: 91 %

## 2021-09-08 DIAGNOSIS — L89.313 PRESSURE ULCER OF RIGHT BUTTOCK, STAGE 3 (HCC): Primary | ICD-10-CM

## 2021-09-08 PROCEDURE — 99213 OFFICE O/P EST LOW 20 MIN: CPT | Performed by: NURSE PRACTITIONER

## 2021-09-08 PROCEDURE — 99213 OFFICE O/P EST LOW 20 MIN: CPT

## 2021-09-08 RX ORDER — LIDOCAINE HYDROCHLORIDE 20 MG/ML
JELLY TOPICAL ONCE
Status: CANCELLED | OUTPATIENT
Start: 2021-09-08 | End: 2021-09-08

## 2021-09-08 RX ORDER — CLOBETASOL PROPIONATE 0.5 MG/G
OINTMENT TOPICAL ONCE
Status: CANCELLED | OUTPATIENT
Start: 2021-09-08 | End: 2021-09-08

## 2021-09-08 RX ORDER — LIDOCAINE 40 MG/G
CREAM TOPICAL ONCE
Status: CANCELLED | OUTPATIENT
Start: 2021-09-08 | End: 2021-09-08

## 2021-09-08 RX ORDER — BACITRACIN, NEOMYCIN, POLYMYXIN B 400; 3.5; 5 [USP'U]/G; MG/G; [USP'U]/G
OINTMENT TOPICAL ONCE
Status: CANCELLED | OUTPATIENT
Start: 2021-09-08 | End: 2021-09-08

## 2021-09-08 RX ORDER — LIDOCAINE HYDROCHLORIDE 40 MG/ML
SOLUTION TOPICAL ONCE
Status: CANCELLED | OUTPATIENT
Start: 2021-09-08 | End: 2021-09-08

## 2021-09-08 RX ORDER — GENTAMICIN SULFATE 1 MG/G
OINTMENT TOPICAL ONCE
Status: CANCELLED | OUTPATIENT
Start: 2021-09-08 | End: 2021-09-08

## 2021-09-08 RX ORDER — BETAMETHASONE DIPROPIONATE 0.05 %
OINTMENT (GRAM) TOPICAL ONCE
Status: CANCELLED | OUTPATIENT
Start: 2021-09-08 | End: 2021-09-08

## 2021-09-08 RX ORDER — GINSENG 100 MG
CAPSULE ORAL ONCE
Status: CANCELLED | OUTPATIENT
Start: 2021-09-08 | End: 2021-09-08

## 2021-09-08 RX ORDER — BACITRACIN ZINC AND POLYMYXIN B SULFATE 500; 1000 [USP'U]/G; [USP'U]/G
OINTMENT TOPICAL ONCE
Status: CANCELLED | OUTPATIENT
Start: 2021-09-08 | End: 2021-09-08

## 2021-09-08 RX ORDER — LIDOCAINE 50 MG/G
OINTMENT TOPICAL ONCE
Status: CANCELLED | OUTPATIENT
Start: 2021-09-08 | End: 2021-09-08

## 2021-09-08 NOTE — PROGRESS NOTES
[address] [place] [address] and Procedure Note      Justino Blood  MEDICAL RECORD NUMBER:  [id number]  AGE: 78 y.o. GENDER: female  : 1950  EPISODE DATE:  2021    SUBJECTIVE:     Chief Complaint   Patient presents with    Wound Check     Sacrum         HISTORY OF PRESENT ILLNESS      Justino Blood is a 78 y.o. female who presents today for  re-evaluation of pressure injury to right buttock. Patient is s/p surgical excision of wound, 21, per Dr. Huong Reyes. Current wound care includes twice daily Dakin's. Hospital bed has been ordered per Dr. Huong Reyes. DME for low air loss mattress was placed at last visit, patient presents today with approval letter for this. Patient is current with [address] in [place] for wound care needs. She currently lives with her son who has been working with patient to slowly increase step count daily, currently up to around 3000 daily. She has been drinking 2 protein drinks daily as recommended by Dr. Huong Reyes. She denies any further needs or concerns.     PAST MEDICAL HISTORY             Diagnosis Date    CHF [facility]congestive heart failure) ([facility])     Dr. Luna Lira Depression     Gout attack     Hypertension     Osteoarthritis     Pulmonary artery hypertension ([facility] [address]     [facility]-- Dr. Yahaiar Krueger-- Dr. Luna Lira Renal calculi     Dr. Ivette Eckert Thrombocytopenia [facility])        PAST SURGICAL HISTORY     Past Surgical History:   Procedure Laterality Date    APPENDECTOMY      FACIAL NERVE SURGERY          PRESSURE ULCER DEBRIDEMENT Right 2021    EXCISION RIGHT BUTTOCK WOUND AND CLOSURE performed by Bill Esparza MD at [address] [facility]     Family History   Problem Relation Age of Onset    Diabetes Father    Ardyth Moritz Arthritis Mother    Ardyth Moritz COPD Mother     Diabetes Sister     Heart Disease Maternal Uncle     Breast Cancer Niece 36    Sleep Apnea Brother     Asthma Neg Hx     Birth Defects Neg Hx     Cancer Neg Hx     Depression Neg Hx     Early Death Neg Hx     Hearing Loss Neg Hx     High Blood Pressure Neg Hx     High Cholesterol Neg Hx     Kidney Disease Neg Hx     Learning Disabilities Neg Hx     Mental Illness Neg Hx     Mental Retardation Neg Hx     Miscarriages / Stillbirths Neg Hx     Stroke Neg Hx     Substance Abuse Neg Hx     Vision Loss Neg Hx     Other Neg Hx        SOCIAL HISTORY     Social History     Tobacco Use    Smoking status: Never Smoker    Smokeless tobacco: Never Used   Vaping Use    Vaping Use: Never used   Substance Use Topics    Alcohol use: No    Drug use: No       ALLERGIES     No Known Allergies    MEDICATIONS     Current Outpatient Medications on File Prior to Encounter   Medication Sig Dispense Refill    sodium chloride 1 g tablet Take 1 tablet by mouth 2 times daily 240 tablet 3    allopurinol (ZYLOPRIM) 300 MG tablet Take 1 tablet by mouth daily 90 tablet 3    FLUoxetine (PROZAC) 40 MG capsule Take 1 capsule by mouth daily 90 capsule 3    potassium chloride (KLOR-CON M) 10 MEQ extended release tablet Take 1 tablet by mouth every other day 90 tablet 3    bumetanide (BUMEX) 1 MG tablet Take 1 tablet by mouth daily 60 tablet 11    metoprolol tartrate (LOPRESSOR) 25 MG tablet Take 0.5 tablets by mouth 2 times daily 90 tablet 1    Multiple Vitamins-Minerals (MULTIVITAMIN WOMEN PO) Take 1 tablet by mouth daily      Riociguat (ADEMPAS) 2.5 MG TABS Take 2.5 mg by mouth 3 times daily      LORazepam (ATIVAN) 0.5 MG tablet Take 1 tablet by mouth every 8 hours as needed for Anxiety for up to 30 days. F41.9 20 tablet 0    sodium hypochlorite (DAKINS) 0.125 % SOLN external solution Apply Dakin's moistened gauze dressings to wound twice daily and as needed.  1 Bottle 2    acetaminophen (TYLENOL) 650 MG extended release tablet Take 650 mg by mouth every 8 hours as needed for Pain      docusate sodium (COLACE) 100 MG capsule Take 100 mg by mouth as needed       aspirin 81 MG tablet Take 81 mg by mouth daily        No current facility-administered medications on file prior to encounter. REVIEW OF SYSTEMS:   Constitutional: Denies fever, chills, night sweats, fatigue, weight loss/gain, loss of appetite   Head: Denies headache,  dizziness, loss of consciousness  Respiratory: Denies shortness of breath, cough, wheezing, dyspnea with exertion  Cardiovascular:Denies chest pain, palpitations, edema  Gastrointestinal: Denies nausea, vomiting, constipation, diarrhea, abdominal pain   DARÍO: +urinary incontinence Denies dysuria, frequency, urgency, hematuria  Musculoskeletal: +Generalized muscle weakness, use of walker as ambulatory aid  Endocrine: Denies polyuria, polydipsia, cold or heat intolerance  Hematology: Denies easy brusing or bleeding, hx of clotting disorder  Dermatology: +wound Denies skin rash, eczema, pruritis    PHYSICAL EXAM:     BP (!) 114/56   Pulse 79   Temp 96.6 °F (35.9 °C) (Infrared)   Resp 18   SpO2 91%   Wt Readings from Last 3 Encounters:   08/12/21 170 lb (77.1 kg)   08/06/21 173 lb (78.5 kg)   08/03/21 173 lb (78.5 kg)     General:  Awake, alert, no apparent distress. Chronically ill appearing  HEENT: conjuctivae are clear without exudate or hemorrhage, anicteric sclera, moist oral mucosa. Chest:  Respirations regular, non-labored. Chest rise and fall equal bilaterally. Neurological: Awake, alert  Psychiatric:  Withdrawn  Extremities: non-traumatic in appearance. Skin:  Warm and dry  Wound:    Right buttock/gluteal cleft wound bed granular with slough. Moderate amounts of serosanguinous drainage present. Periwound hyperpigmented. No redness, warmth or induration. Wound 09/16/19  Inner bleeding, excoriation (Active)   Number of days: 722       Wound 08/24/21 Sacrum (Active)   Wound Image   09/08/21 1352   Wound Etiology Non-Healing Surgical 09/08/21 1352   Dressing Status Intact; New dressing applied 09/08/21 1352   Wound Cleansed Cleansed with saline 09/08/21 1352   Dressing/Treatment Zinc paste;Roll gauze;Packing;Moisten with saline;Gauze dressing/dressing sponge;ABD 09/08/21 1352   Offloading for Diabetic Foot Ulcers No offloading required 09/08/21 1352   Wound Length (cm) 7 cm 09/08/21 1352   Wound Width (cm) 3.2 cm 09/08/21 1352   Wound Depth (cm) 3.1 cm 09/08/21 1352   Wound Surface Area (cm^2) 22.4 cm^2 09/08/21 1352   Change in Wound Size % (l*w) 14.67 09/08/21 1352   Wound Volume (cm^3) 69.44 cm^3 09/08/21 1352   Wound Healing % 30 09/08/21 1352   Distance Tunneling (cm) 2.2 cm 09/08/21 1352   Tunneling Position ___ O'Clock 12 09/08/21 1352   Wound Assessment Granulation tissue;Slough 09/08/21 1352   Drainage Amount Moderate 09/08/21 1352   Drainage Description Serosanguinous 09/08/21 1352   Odor None 09/08/21 1352   Katey-wound Assessment Hyperpigmented;Dry/flaky 09/08/21 1352   Margins Attached edges 09/08/21 1352   Wound Thickness Description not for Pressure Injury Full thickness 09/08/21 1352   Number of days: 15         LABS/IMAGING     UA: No results for input(s): SPECGRAV, PHUR, COLORU, CLARITYU, MUCUS, PROTEINU, BLOODU, RBCUA, WBCUA, BACTERIA, NITRU, GLUCOSEU, BILIRUBINUR, UROBILINOGEN, KETUA, LABCAST, LABCASTTY, AMORPHOS in the last 72 hours.     Invalid input(s): CRYSTALS  Micro: No results found for: BC      ASSESSMENT     -Stage 3 pressure injury, right buttock/sacrum     Ulcer Identification:  Ulcer Type: pressure  Contributing Factors: chronic pressure, decreased mobility, shear force, obesity and incontinence of urine     Depth of Diabetic/Pressure/Non Pressure Ulcers or Wound:  Pressure ulcer, Stage 3     Patient Active Problem List   Diagnosis Code    HTN (hypertension) I10    Chronic depression F32.9    CHF (congestive heart failure) ([facility]) I50.9    Thrombocytopenia ([facility]) D69.6    Moderate episode of recurrent major depressive disorder ([facility]) F33.1    Renal failure syndrome N19    Hyperkalemia E87.5    Isolated non-nephrotic proteinuria R80.0    ALVIN (acute kidney injury) ([facility] [address] N17.9    Chronic right-sided heart failure (HCC) I50.812    Pulmonary HTN (HCC) I27.20    Hypotension due to hypovolemia I95.89, E86.1    Acute renal failure superimposed on stage 4 chronic kidney disease (HCC) N17.9, N18.4    Pressure ulcer of right buttock, stage 3 ([address] L89.313       PLAN     Patient examined and evaluated. All available lab work, radiology studies, and progress notes pertaining to Quincy Bennett reviewed prior to or during patient visit today.    -Stage 3 pressure injury, right buttock/gluteal cleft- Showing improvement at today's visit, measuring smaller, wound bed granular with decreased amounts of slough. Patient and family member deny any concerns with dressings as ordered. They note that she has been able to slowly increase her activity since discontinuing negative pressure wound therapy. This was stopped at last visit due to frequent leaking and loss of seal related to location of wound. Will continue twice daily Dakin's dressings as ordered. Location of wound will make placement of dressings for negative pressure wound therapy very challenging. Patient has reported increased stress/anxiety and decreased mobility with its use. Ok to sent back to .      -No indications of infection to wound at today's visit. Education provided on signs and symptoms of infection. Call clinic or seek emergency care should these occur. Educated Quincy Bennett on the importance of offloading wound(s) for wound healing/prevention. Pressure reduction techniques reviewed. Patient verbalized understanding. Follow up 2-3 weeks. Call clinic with any needs or concerns prior to scheduled visit. All questions and concerns addressed prior to discharge from today's visit. Please see attached Discharge Instructions    Written patient dismissal instructions given to patient and signed by patient or POA.          Treatment:   Orders Placed This Encounter   Procedures Orders:  - Turn and reposition every 1-2 hours. Keep pressure off of wound area. - No more than 1 hour up in chair at one time. - Get up and moving frequently. - Use offloading cushion in chair.  - Call [address] to see when low air-loss mattress for hospital bed will be delivered. [phone]  - Make sure you are eating enough protein in diet to help with wound healing.     Home Care: [address] [place] [address]     Wound Location: Sacrum     Dressing orders:      1) Gather wound care supplies and arrange on clean table.      2) Wash your hands with soap and water or use alcohol based hand  for 20 seconds (sing \"Happy Birthday\" twice).    3) Cleanse wounds with normal saline or wound cleanser and gauze. Pat dry with clean gauze.    4) Sacrum- Apply zinc oxide cream (diaper rash cream) to skin around wound to protect it. Pack wound lightly with Dakins moistened roll gauze. Cover with gauze and ABD pad. Secure with pink hy-tape. Change twice daily and as needed if soiled.     Keep all dressings clean & dry.     Do not shower, take baths or get wound wet, unless otherwise instructed by your Wound Care doctor.      Follow up visit: 3 Weeks on Wednesday September 29th at 1:30 pm     Standing Status:   Standing     Number of Occurrences:   1     I spent a total of 25 minutes reviewing previous notes, test results and face to face with Raj Dong  on 9/8/2021. Greater than 50% of this time was spent on counseling, reviewing and discussing test results, answering questions, instructions on treatment and/or medications ordered, and coordinating the care based on my plan and assessment as noted. Discharge Instructions     Discharge Instructions       Visit Discharge/Physician Orders:  - Turn and reposition every 1-2 hours. Keep pressure off of wound area. - No more than 1 hour up in chair at one time. - Get up and moving frequently.   - Use offloading cushion in chair.  - Call [address] [place] [address] to see when low air-loss mattress for hospital bed will be delivered. [phone]  - Make sure you are eating enough protein in diet to help with wound healing.     Home Care: [address] [place] [address]     Wound Location: Sacrum     Dressing orders:      1) Gather wound care supplies and arrange on clean table.      2) Wash your hands with soap and water or use alcohol based hand  for 20 seconds (sing \"Happy Birthday\" twice).    3) Cleanse wounds with normal saline or wound cleanser and gauze. Pat dry with clean gauze.    4) Sacrum- Apply zinc oxide cream (diaper rash cream) to skin around wound to protect it. Pack wound lightly with Dakins moistened roll gauze. Cover with gauze and ABD pad. Secure with pink hy-tape. Change twice daily and as needed if soiled.     Keep all dressings clean & dry.     Do not shower, take baths or get wound wet, unless otherwise instructed by your Wound Care doctor.      Follow up visit: 3 Weeks on Wednesday September 29th at 1:30 pm     Keep next scheduled appointment. Please give 24 hour notice if unable to keep appointment. [phone]     If you experience any of the following, please call the Wound Care Service during business hours: Monday through Friday 8:00 am - 4:30 pm  ([phone]).               *Increase in pain              *Temperature over 101              *Increase in drainage from your wound or a foul odor              *Uncontrolled swelling              *Need for compression bandage changes due to slippage, breakthrough drainage     If you need medical attention outside of business hours, please contact your Primary Care Doctor or go to the nearest emergency room.            Electronically signed by LANE Flores CNP on 9/8/2021 at 4:02 PM

## 2021-09-10 NOTE — PLAN OF CARE
Problem: Wound:  Goal: Skin integrity intact  Outcome: Ongoing   Patient presents to wound clinic for follow up of sacrum wound. Discharge instructions/dressing orders faxed to [address] [place] [address]. Sacrum- Apply zinc oxide cream (diaper rash cream) to skin around wound to protect it. Pack wound lightly with Dakins moistened roll gauze. Cover with gauze and ABD pad. Secure with pink hy-tape. Change twice daily and as needed if soiled. Follow up visit: 3 Weeks on Wednesday September 29th at 1:30 pm.    Care plan reviewed with patient. Patient verbalize understanding of the plan of care and contribute to goal setting.

## 2021-09-24 ENCOUNTER — HOSPITAL ENCOUNTER (OUTPATIENT)
Age: 71
Discharge: HOME | End: 2021-09-24
Payer: MEDICARE

## 2021-09-24 DIAGNOSIS — N06.9 ISOLATED PROTEINURIA WITH MORPHOLOGIC LESION: ICD-10-CM

## 2021-09-24 DIAGNOSIS — N18.31 STAGE 3A CHRONIC KIDNEY DISEASE (HCC): ICD-10-CM

## 2021-09-24 DIAGNOSIS — E87.79 OTHER FLUID OVERLOAD: ICD-10-CM

## 2021-09-24 DIAGNOSIS — I10 ESSENTIAL HYPERTENSION: ICD-10-CM

## 2021-09-24 LAB
ANION GAP SERPL CALCULATED.3IONS-SCNC: 8 MEQ/L (ref 8–16)
BUN BLDV-MCNC: 29 MG/DL (ref 7–22)
CALCIUM SERPL-MCNC: 9.4 MG/DL (ref 8.5–10.5)
CHLORIDE BLD-SCNC: 104 MEQ/L (ref 98–111)
CO2: 29 MEQ/L (ref 23–33)
CREAT SERPL-MCNC: 1.1 MG/DL (ref 0.4–1.2)
GFR SERPL CREATININE-BSD FRML MDRD: 49 ML/MIN/1.73M2
GLUCOSE BLD-MCNC: 115 MG/DL (ref 70–108)
POTASSIUM SERPL-SCNC: 4 MEQ/L (ref 3.5–5.2)
SODIUM BLD-SCNC: 141 MEQ/L (ref 135–145)

## 2021-09-24 PROCEDURE — 80048 BASIC METABOLIC PNL TOTAL CA: CPT

## 2021-09-24 PROCEDURE — 36415 COLL VENOUS BLD VENIPUNCTURE: CPT

## 2021-09-29 ENCOUNTER — HOSPITAL ENCOUNTER (OUTPATIENT)
Dept: WOUND CARE | Age: 71
Discharge: HOME | End: 2021-09-29
Payer: MEDICARE

## 2021-09-29 VITALS
RESPIRATION RATE: 18 BRPM | DIASTOLIC BLOOD PRESSURE: 51 MMHG | TEMPERATURE: 98 F | BODY MASS INDEX: 34.52 KG/M2 | HEART RATE: 78 BPM | SYSTOLIC BLOOD PRESSURE: 105 MMHG | OXYGEN SATURATION: 90 % | WEIGHT: 160 LBS | HEIGHT: 57 IN

## 2021-09-29 DIAGNOSIS — L89.313 PRESSURE ULCER OF RIGHT BUTTOCK, STAGE 3 (HCC): Primary | ICD-10-CM

## 2021-09-29 PROCEDURE — 99213 OFFICE O/P EST LOW 20 MIN: CPT | Performed by: NURSE PRACTITIONER

## 2021-09-29 PROCEDURE — 99213 OFFICE O/P EST LOW 20 MIN: CPT

## 2021-09-29 RX ORDER — LIDOCAINE HYDROCHLORIDE 40 MG/ML
SOLUTION TOPICAL ONCE
Status: CANCELLED | OUTPATIENT
Start: 2021-09-29 | End: 2021-09-29

## 2021-09-29 RX ORDER — LIDOCAINE HYDROCHLORIDE 20 MG/ML
JELLY TOPICAL ONCE
Status: CANCELLED | OUTPATIENT
Start: 2021-09-29 | End: 2021-09-29

## 2021-09-29 RX ORDER — LIDOCAINE 50 MG/G
OINTMENT TOPICAL ONCE
Status: CANCELLED | OUTPATIENT
Start: 2021-09-29 | End: 2021-09-29

## 2021-09-29 RX ORDER — CLOBETASOL PROPIONATE 0.5 MG/G
OINTMENT TOPICAL ONCE
Status: CANCELLED | OUTPATIENT
Start: 2021-09-29 | End: 2021-09-29

## 2021-09-29 RX ORDER — BACITRACIN ZINC AND POLYMYXIN B SULFATE 500; 1000 [USP'U]/G; [USP'U]/G
OINTMENT TOPICAL ONCE
Status: CANCELLED | OUTPATIENT
Start: 2021-09-29 | End: 2021-09-29

## 2021-09-29 RX ORDER — LIDOCAINE 40 MG/G
CREAM TOPICAL ONCE
Status: CANCELLED | OUTPATIENT
Start: 2021-09-29 | End: 2021-09-29

## 2021-09-29 RX ORDER — GINSENG 100 MG
CAPSULE ORAL ONCE
Status: CANCELLED | OUTPATIENT
Start: 2021-09-29 | End: 2021-09-29

## 2021-09-29 RX ORDER — GENTAMICIN SULFATE 1 MG/G
OINTMENT TOPICAL ONCE
Status: CANCELLED | OUTPATIENT
Start: 2021-09-29 | End: 2021-09-29

## 2021-09-29 RX ORDER — BACITRACIN, NEOMYCIN, POLYMYXIN B 400; 3.5; 5 [USP'U]/G; MG/G; [USP'U]/G
OINTMENT TOPICAL ONCE
Status: CANCELLED | OUTPATIENT
Start: 2021-09-29 | End: 2021-09-29

## 2021-09-29 RX ORDER — BETAMETHASONE DIPROPIONATE 0.05 %
OINTMENT (GRAM) TOPICAL ONCE
Status: CANCELLED | OUTPATIENT
Start: 2021-09-29 | End: 2021-09-29

## 2021-09-29 ASSESSMENT — PAIN SCALES - GENERAL: PAINLEVEL_OUTOF10: 0

## 2021-09-29 NOTE — PLAN OF CARE
Problem: Wound:  Goal: Skin integrity intact  Outcome: Ongoing   Patient presents to wound clinic for follow up of sacrum wound. Discharge instructions/dressing orders faxed to [address] [place] [address]. Sacrum- Apply zinc oxide cream (diaper rash cream) to skin around wound to protect it. Pack wound lightly with Dakins moistened roll gauze. Cover with gauze and ABD pad. Secure with pink hy-tape. Change twice daily and as needed if soiled. Follow up visit: 4 Weeks on Wednesday October 27th at 1:30 pm.    Care plan reviewed with patient and friend. Patient and friend verbalize understanding of the plan of care and contribute to goal setting.

## 2021-09-29 NOTE — PROGRESS NOTES
[address] [facility] and Procedure Note      Edward Waggoner  MEDICAL RECORD NUMBER:  [id number]  AGE: 73 y.o. GENDER: female  : 1950  EPISODE DATE:  2021    SUBJECTIVE:     Chief Complaint   Patient presents with    Wound Check     Sacrum         HISTORY OF PRESENT ILLNESS      Edward Waggoner is a 73 y.o. female who presents today for  re-evaluation of pressure injury to right buttock. Patient is s/p surgical excision of wound, 21, per Dr. Ralph Bullard. Current wound care includes twice daily Dakin's. Recently received low air loss mattress for pressure offloading, voices satisfaction with this. Patient is current with [address] in [place] for wound care needs. She currently lives with her son who has been working with patient to slowly increase step count daily. She has been drinking 2 protein drinks daily as recommended by Dr. Ralph Bullard. She denies any further needs or concerns.     PAST MEDICAL HISTORY             Diagnosis Date    CHF (congestive heart failure) ([facility])     Dr. Doc Rizzo Depression     Gout attack     Hypertension     Osteoarthritis     Pulmonary artery hypertension ([facility]     [facility]-- Dr. Demetrius Celaya-- Dr. Doc Rizzo Renal calculi     Dr. Jay Hoover Thrombocytopenia [facility])        PAST SURGICAL HISTORY     Past Surgical History:   Procedure Laterality Date    APPENDECTOMY      FACIAL NERVE SURGERY          PRESSURE ULCER DEBRIDEMENT Right 2021    EXCISION RIGHT BUTTOCK WOUND AND CLOSURE performed by Steve Dela Cruz MD at [place] [address]     Family History   Problem Relation Age of Onset    Diabetes Father    Temo Gonzalezgomery Arthritis Mother     COPD Mother     Diabetes Sister     Heart Disease Maternal Uncle     Breast Cancer Niece 36    Sleep Apnea Brother     Asthma Neg Hx     Birth Defects Neg Hx     Cancer Neg Hx     Depression Neg Hx     Early Death Neg Hx     Hearing Loss Neg Hx     High Blood Pressure Neg Hx     High Cholesterol Neg Hx  Kidney Disease Neg Hx     Learning Disabilities Neg Hx     Mental Illness Neg Hx     Mental Retardation Neg Hx     Miscarriages / Stillbirths Neg Hx     Stroke Neg Hx     Substance Abuse Neg Hx     Vision Loss Neg Hx     Other Neg Hx        SOCIAL HISTORY     Social History     Tobacco Use    Smoking status: Never Smoker    Smokeless tobacco: Never Used   Vaping Use    Vaping Use: Never used   Substance Use Topics    Alcohol use: No    Drug use: No       ALLERGIES     No Known Allergies    MEDICATIONS     Current Outpatient Medications on File Prior to Encounter   Medication Sig Dispense Refill    sodium chloride 1 g tablet Take 1 tablet by mouth 2 times daily 240 tablet 3    allopurinol (ZYLOPRIM) 300 MG tablet Take 1 tablet by mouth daily 90 tablet 3    FLUoxetine (PROZAC) 40 MG capsule Take 1 capsule by mouth daily 90 capsule 3    potassium chloride (KLOR-CON M) 10 MEQ extended release tablet Take 1 tablet by mouth every other day 90 tablet 3    bumetanide (BUMEX) 1 MG tablet Take 1 tablet by mouth daily 60 tablet 11    metoprolol tartrate (LOPRESSOR) 25 MG tablet Take 0.5 tablets by mouth 2 times daily 90 tablet 1    Multiple Vitamins-Minerals (MULTIVITAMIN WOMEN PO) Take 1 tablet by mouth daily      Riociguat (ADEMPAS) 2.5 MG TABS Take 2.5 mg by mouth 3 times daily      aspirin 81 MG tablet Take 81 mg by mouth daily       LORazepam (ATIVAN) 0.5 MG tablet Take 1 tablet by mouth every 8 hours as needed for Anxiety for up to 30 days. F41.9 20 tablet 0    sodium hypochlorite (DAKINS) 0.125 % SOLN external solution Apply Dakin's moistened gauze dressings to wound twice daily and as needed. 1 Bottle 2    acetaminophen (TYLENOL) 650 MG extended release tablet Take 650 mg by mouth every 8 hours as needed for Pain      docusate sodium (COLACE) 100 MG capsule Take 100 mg by mouth as needed        No current facility-administered medications on file prior to encounter.        REVIEW OF Gauze dressing/dressing sponge;Packing;Moisten with saline;ABD;Zinc paste 09/29/21 1322   Offloading for Diabetic Foot Ulcers No offloading required 09/29/21 1322   Wound Length (cm) 4.4 cm 09/29/21 1322   Wound Width (cm) 2.7 cm 09/29/21 1322   Wound Depth (cm) 2.7 cm 09/29/21 1322   Wound Surface Area (cm^2) 11.88 cm^2 09/29/21 1322   Change in Wound Size % (l*w) 54.74 09/29/21 1322   Wound Volume (cm^3) 32. 076 cm^3 09/29/21 1322   Wound Healing % 68 09/29/21 1322   Distance Tunneling (cm) 4 cm 09/29/21 1322   Tunneling Position ___ O'Clock 12 09/29/21 1322   Undermining Starts ___ O'Clock 12 09/29/21 1322   Undermining Ends___ O'Clock 3 09/29/21 1322   Undermining Maxium Distance (cm) 0.4 09/29/21 1322   Wound Assessment Granulation tissue;Fibrinous 09/29/21 1322   Drainage Amount Moderate 09/29/21 1322   Drainage Description Serosanguinous 09/29/21 1322   Odor None 09/29/21 1322   Katey-wound Assessment Dry/flaky; Intact; Blanchable erythema 09/29/21 1322   Margins Attached edges 09/29/21 1322   Wound Thickness Description not for Pressure Injury Full thickness 09/29/21 1322   Number of days: 36         LABS/IMAGING     UA: No results for input(s): SPECGRAV, PHUR, COLORU, CLARITYU, MUCUS, PROTEINU, BLOODU, RBCUA, WBCUA, BACTERIA, NITRU, GLUCOSEU, BILIRUBINUR, UROBILINOGEN, KETUA, LABCAST, LABCASTTY, AMORPHOS in the last 72 hours.     Invalid input(s): CRYSTALS  Micro: No results found for: BC      ASSESSMENT     -Stage 3 pressure injury, right buttock/sacrum     Ulcer Identification:  Ulcer Type: pressure  Contributing Factors: chronic pressure, decreased mobility, shear force, obesity and incontinence of urine     Depth of Diabetic/Pressure/Non Pressure Ulcers or Wound:  Pressure ulcer, Stage 3     Patient Active Problem List   Diagnosis Code    HTN (hypertension) I10    Chronic depression F32.9    CHF (congestive heart failure) (HCC) I50.9    Thrombocytopenia (HCC) D69.6    Moderate episode of recurrent major depressive disorder ([facility] F33.1    Renal failure syndrome N19    Hyperkalemia E87.5    Isolated non-nephrotic proteinuria R80.0    ALVIN (acute kidney injury) ([facility] N17.9    Chronic right-sided heart failure (HCC) I50.812    Pulmonary HTN (HCC) I27.20    Hypotension due to hypovolemia I95.89, E86.1    Acute renal failure superimposed on stage 4 chronic kidney disease (HCC) N17.9, N18.4    Pressure ulcer of right buttock, stage 3 ([facility] L89.313       PLAN     Patient examined and evaluated. All available lab work, radiology studies, and progress notes pertaining to Anay Christianson reviewed prior to or during patient visit today.    -Stage 3 pressure injury, right buttock/gluteal cleft- Continues to show improvement with wound care as ordered, measuring much smaller at today's visit. Patient and family member deny any concerns with dressings as ordered. Continue twice daily Dakin's dressings as ordered.     -No indications of infection to wound at today's visit. Education provided on signs and symptoms of infection. Call clinic or seek emergency care should these occur. Educated Anay Christianson on the importance of offloading wound(s) for wound healing/prevention. Pressure reduction techniques reviewed. Patient verbalized understanding. Follow up 2-3 weeks. Call clinic with any needs or concerns prior to scheduled visit. All questions and concerns addressed prior to discharge from today's visit. Please see attached Discharge Instructions    Written patient dismissal instructions given to patient and signed by patient or POA.          Treatment:   Orders Placed This Encounter   Procedures    Initiate Outpatient Wound Care Protocol     Cleanse wound with saline    If wound contains bioburden or contamination cleanse with wound cleanser or antimicrobial solution     For normal periwound tissue without irritation nor maceration, apply topical skin protectant    For periwound tissue with irritation and/or maceration, apply zinc based product, topical steroid cream/ointment, or equivalent     For wounds with dry firm black eschar and/or without exudate, apply betadine and leave open to air      For wounds with scant/small to no exudate or drainage, apply wound gel, hydrocolloid, polymer, or equivalent and cover with secondary dressing/foam      For wounds with moderate/large exudate or drainage, apply alginate, hydrofiber, polymer, or equivalent and cover with secondary dressing/foam    For wounds with nonviable tissue requiring removal, apply chemical or mechanical debrider and cover with secondary dressing/foam    For wounds with tunneling, dead space, or cavity, fill or pack with strip/gauze/kerlex to fit and cover with secondary dressing/foam    For wounds with adequate granulation or epithelization, apply wound gel, hydrocolloid, polymer, collagen, or transparent film, and cover secondary dry dressing/foam    For wounds that need additional secondary dressing to help pad or control additional drainage/exudates, add foam, absorbent pad or hydrocolloid    For wounds with suspected or known infection, apply antimicrobial mesh and/or antimicrobial alginate/hydrofiber, or antimicrobial solution moistened gauze/kerlex, or equivalent and cover with secondary dressing/foam    Compression Management needed for edema control, apply multilayer compression or tubular garment or equivalent    Offloading Management needed for pressure relief, apply offloading shoe/boot or equivalent     Standing Status:   Standing     Number of Occurrences:   1    Misc nursing order (specify)     Visit Discharge/Physician Orders:  - Turn and reposition every 1-2 hours. Keep pressure off of wound area. - No more than 1 hour up in chair at one time. - Get up and moving frequently. - Use offloading cushion in chair.  - Continue low air-loss mattress.   - Make sure you are eating enough protein in diet to help with wound healing.  - Home Health must order enough pink hy-tape for patient to have for all dressing changes     Home Care: [address] [place] [address]     Wound Location: Sacrum     Dressing orders:      1) Gather wound care supplies and arrange on clean table.      2) Wash your hands with soap and water or use alcohol based hand  for 20 seconds (sing \"Happy Birthday\" twice).    3) Cleanse wounds with normal saline or wound cleanser and gauze. Pat dry with clean gauze.    4) Sacrum- Apply zinc oxide cream (diaper rash cream) to skin around wound to protect it. Pack wound lightly with Dakins moistened roll gauze. Cover with gauze and ABD pad. Secure with pink hy-tape. Change twice daily and as needed if soiled.     Keep all dressings clean & dry.     Do not shower, take baths or get wound wet, unless otherwise instructed by your Wound Care doctor.      Follow up visit: [address] [place] [address] Wednesday October 27th at 1:30 pm     Standing Status:   Standing     Number of Occurrences:   1     Discharge Instructions     Discharge Instructions       Visit Discharge/Physician Orders:  - Turn and reposition every 1-2 hours. Keep pressure off of wound area. - No more than 1 hour up in chair at one time. - Get up and moving frequently. - Use offloading cushion in chair.  - Continue low air-loss mattress. - Make sure you are eating enough protein in diet to help with wound healing.  - Home Health must order enough pink hy-tape for patient to have for all dressing changes     Home Care: [address] [place] [address]     Wound Location: Sacrum     Dressing orders:      1) Gather wound care supplies and arrange on clean table.      2) Wash your hands with soap and water or use alcohol based hand  for 20 seconds (sing \"Happy Birthday\" twice).    3) Cleanse wounds with normal saline or wound cleanser and gauze. Pat dry with clean gauze.      4) Sacrum- Apply zinc oxide cream (diaper rash cream) to skin around wound to protect it. Pack wound lightly with Dakins moistened roll gauze. Cover with gauze and ABD pad. Secure with pink hy-tape. Change twice daily and as needed if soiled.     Keep all dressings clean & dry.     Do not shower, take baths or get wound wet, unless otherwise instructed by your Wound Care doctor.      Follow up visit: [facility] Wednesday October 27th at 1:30 pm     Keep next scheduled appointment. Please give 24 hour notice if unable to keep appointment. [phone]     If you experience any of the following, please call the Wound Care Service during business hours: Monday through Friday 8:00 am - 4:30 pm  ([phone]).               *Increase in pain              *Temperature over 101              *Increase in drainage from your wound or a foul odor              *Uncontrolled swelling              *Need for compression bandage changes due to slippage, breakthrough drainage     If you need medical attention outside of business hours, please contact your Primary Care Doctor or go to the nearest emergency room.            Electronically signed by LANE Thibodeaux CNP on 9/29/2021 at 2:14 PM

## 2021-10-22 NOTE — PROGRESS NOTES
Heart Failure Clinic       Visit Date: 10/25/2021  Cardiologist:  Dr. Rick Abel  Primary Care Physician: Dr. Kyra Irizarry MD    Mildred El is a 73 y.o. female who presents today for:  Chief Complaint   Patient presents with    Congestive Heart Failure       HPI:   Mildred El is a 73 y.o. female who presents to the office for a follow up patient visit in the heart failure clinic. [facility] [address] pulmonlogy. Last seen by jonathan on 5/10, noncompliant w/ diet. Accompanied by sister Elif ST HF: HFpEF 41-62% (grade 2 diastolic dysfunction)  Cause: nonischemic/ pulmonary HTN  Device: none  HX: Pulmonary HTN, HTN    Dry Wt:  162 home scale (has been losing weight     Hospitalization:  > 6 months for CHF    Concerns today: Sister notes that ever since her surgery for her decubitus ulcer she has notes her breathing slightly heavier and needing to use walker again since surgery as well.      Activity: ADLs performed w/o limitation  Diet: does not add salt, low sodium diet follow (meals made by brother),     Patient has:  Chest Pain: no  SOB: TIDWELL  Orthopnea/PND: sleeps at 45 degree angle  BARBER: no, was tested  Edema: no  Fatigue: no  Abdominal bloating: no  Cough: no  Appetite: poor, not much of an appetite   Home weight: daily   Home blood pressure: daily 110/60s    Past Medical History:   Diagnosis Date    [facility]est[facility]     Dr. Rick Abel    Depression     Gout attack     Hypertension     Osteoarthritis     Pulmonary artery hypertension ([facility] [address]     [facility]-- Dr. Domonique Deras-- Dr. Frandy Plummer Renal calculi     Dr. Giuseppe Oquendo [facility])      Past Surgical History:   Procedure Laterality Date    APPENDECTOMY  1960    FACIAL NERVE SURGERY      1989    PRESSURE ULCER DEBRIDEMENT Right 8/6/2021    EXCISION RIGHT BUTTOCK WOUND AND CLOSURE performed [facility]inita [facility]azar [facility] History   Problem Relation Age of Onset    Diabetes Father    [facility] Arthritis Mother     COPD Mother    [facility] Diabetes Sister     Heart Disease Maternal Uncle     Breast Cancer Niece 36    Sleep Apnea Brother     Asthma Neg Hx     Birth Defects Neg Hx     Cancer Neg Hx     Depression Neg Hx     Early Death Neg Hx     Hearing Loss Neg Hx     High Blood Pressure Neg Hx     High Cholesterol Neg Hx     Kidney Disease Neg Hx     Learning Disabilities Neg Hx     Mental Illness Neg Hx     Mental Retardation Neg Hx     Miscarriages / Stillbirths Neg Hx     Stroke Neg Hx     Substance Abuse Neg Hx     Vision Loss Neg Hx     Other Neg Hx      Social History     Tobacco Use    Smoking status: Never Smoker    Smokeless tobacco: Never Used   Substance Use Topics    Alcohol use: No     Current Outpatient Medications   Medication Sig Dispense Refill    metoprolol tartrate (LOPRESSOR) 25 MG tablet Take 0.5 tablets by mouth 2 times daily 90 tablet 1    sodium chloride 1 g tablet Take 1 tablet by mouth 2 times daily 240 tablet 3    allopurinol (ZYLOPRIM) 300 MG tablet Take 1 tablet by mouth daily 90 tablet 3    FLUoxetine (PROZAC) 40 MG capsule Take 1 capsule by mouth daily 90 capsule 3    potassium chloride (KLOR-CON M) 10 MEQ extended release tablet Take 1 tablet by mouth every other day 90 tablet 3    bumetanide (BUMEX) 1 MG tablet Take 1 tablet by mouth daily 60 tablet 11    Multiple Vitamins-Minerals (MULTIVITAMIN WOMEN PO) Take 1 tablet by mouth daily      acetaminophen (TYLENOL) 650 MG extended release tablet Take 650 mg by mouth every 8 hours as needed for Pain      Riociguat (ADEMPAS) 2.5 MG TABS Take 2.5 mg by mouth 3 times daily      docusate sodium (COLACE) 100 MG capsule Take 100 mg by mouth as needed       aspirin 81 MG tablet Take 81 mg by mouth daily       sodium hypochlorite (DAKINS) 0.125 % SOLN external solution Apply Dakin's moistened gauze dressings to wound twice daily and as needed. 1 Bottle 2     No current facility-administered medications for this visit.      No Known 10/25/21 164 lb (74.4 kg)   09/29/21 160 lb (72.6 kg)   08/12/21 170 lb (77.1 kg)     BP Readings from Last 3 Encounters:   10/25/21 108/64   09/29/21 (!) 105/51   09/08/21 (!) 114/56     Pulse Readings from Last 3 Encounters:   10/25/21 72   09/29/21 78   09/08/21 79     Body mass index is 35.49 kg/m². ECHO:   Summary   Normal left ventricle size and systolic function. Ejection fraction was estimated at 55 TO 60 %. There were no regional left ventricular wall motion abnormalities and wall   thickness was within normal limits. Features were consistent with a pseudonormal left ventricular filling   pattern, with concomitant abnormal relaxation and increased filling   pressure (grade 2 diastolic dysfunction). The left atrium is Mild to moderate dilated. There is mild aortic stenosis with valve area of 1.7 sq cm. Right ventricular systolic pressure measures 45 MMHG (ASSUMING RAP OF 5   MMHG). Signature      ----------------------------------------------------------------   Electronically signed by Michelle Perez MD (Interpreting   physician) on 05/07/2021 at 03:13 PM   ----------------------------------------------------------------      CATH/STRESS:     RIGHT HEART CATHETERIZATION:  RA 15, RV 81/21, PA 85/31 with a mean of 52, wedge pressure 18 mmHg, PA saturation 66%, RA saturation 66%,  cardiac output 3.4, cardiac index 1.8. SUMMARY:  No significant coronary artery disease, preserved LVEF, severe  pulmonary hypertension, elevated wedge pressure/elevated LVEDP  LVEDP is 27.     PLAN:  1. Continue with aggressive diuresis. 2.  Will need likely referral to Pulmonary Hypertension Clinic as I do  not believe that the degree of LV diastolic dysfunction and volume  explain the complete elevation in the PA pressures up to almost 90 mmHg. She was ultimately attempted on aggressive diuresis; however, this  resulted in ALVIN and renal failure.   Therefore, we will have to be  judicious in terms of the time.      Diagnosis Orders   1. CHF (congestive heart failure), NYHA class II, chronic, diastolic (HCC)  Basic Metabolic Panel    Magnesium    Brain Natriuretic Peptide   2. Pulmonary HTN (HCC)       Continue:  GDMT:   ACE/ARB/ARNI - none   BB - Lopressor 12.5 BID   Diuretic - Bumex 1 mg daily  AA -   SGLT2 -  none  Vasodilator -   Other - ASA, KCL 10 every other day    Tolerating above noted HF meds, no ill side effects noted. Will continue to monitor kidney function and electrolytes. Will optimize as tolerated. Pt is compliant w/ medications. Total visit time of 30 minutes has been spent with patient on education of symptoms, management, medication, and plan of care; as well as review of chart: labs, ECHO, radiology reports, etc.   I personally spent more then 50% of the appt time face to face with the patient. · Daily weights  · Fluid restriction of 2 Liters per day  · Limit sodium in diet to around 3263-3961 mg/day  · Monitor BP  · Activity as tolerated     Stable, appears Euvolemic  Lab reviewed - stable  Repeat blood work in 1 year  BP/HR stable   No med changes today   Continue diet/fluid adherence  Continue daily wts. F/U w/ Cardiology  F/U in clinic in 1 year  Call [address] [place] [address] pulmonologist in regards to lower oxygen saturations levels. Suggesting to be evaluated for supplemental O2      Patient was instructed to call the [facility] for any changes in symptoms as noted in AVS.      No follow-ups on file. or sooner if needed     Patient given educational materials - see patient instructions. We discussed the importance of weighing oneself and recording daily. We also discussed the importance of a low sodium diet, higher sodium foods to avoid and better low sodium food options. Patient verbalizes understanding of plan of care using teach back method, and is agreeable to the treatment plan.        Electronically signed by LANE Valladares CNP on 10/25/2021 at 10:42 AM

## 2021-10-25 ENCOUNTER — OFFICE VISIT (OUTPATIENT)
Dept: CARDIOLOGY CLINIC | Age: 71
End: 2021-10-25
Payer: MEDICARE

## 2021-10-25 ENCOUNTER — HOSPITAL ENCOUNTER (OUTPATIENT)
Age: 71
Discharge: HOME | DRG: 004 | End: 2021-10-25
Payer: MEDICARE

## 2021-10-25 VITALS
DIASTOLIC BLOOD PRESSURE: 64 MMHG | HEART RATE: 72 BPM | HEIGHT: 57 IN | OXYGEN SATURATION: 91 % | SYSTOLIC BLOOD PRESSURE: 108 MMHG | BODY MASS INDEX: 35.38 KG/M2 | WEIGHT: 164 LBS

## 2021-10-25 DIAGNOSIS — E87.79 OTHER FLUID OVERLOAD: ICD-10-CM

## 2021-10-25 DIAGNOSIS — N06.9 ISOLATED PROTEINURIA WITH MORPHOLOGIC LESION: ICD-10-CM

## 2021-10-25 DIAGNOSIS — I10 ESSENTIAL HYPERTENSION: ICD-10-CM

## 2021-10-25 DIAGNOSIS — N18.31 STAGE 3A CHRONIC KIDNEY DISEASE (HCC): ICD-10-CM

## 2021-10-25 DIAGNOSIS — I50.32 CHF (CONGESTIVE HEART FAILURE), NYHA CLASS II, CHRONIC, DIASTOLIC (HCC): Primary | ICD-10-CM

## 2021-10-25 DIAGNOSIS — I27.20 PULMONARY HTN (HCC): ICD-10-CM

## 2021-10-25 DIAGNOSIS — I50.32 CHF (CONGESTIVE HEART FAILURE), NYHA CLASS II, CHRONIC, DIASTOLIC (HCC): ICD-10-CM

## 2021-10-25 LAB
ALBUMIN SERPL-MCNC: 3 G/DL (ref 3.5–5.1)
ALP BLD-CCNC: 112 U/L (ref 38–126)
ALT SERPL-CCNC: 11 U/L (ref 11–66)
ANION GAP SERPL CALCULATED.3IONS-SCNC: 9 MEQ/L (ref 8–16)
AST SERPL-CCNC: 20 U/L (ref 5–40)
BACTERIA: ABNORMAL
BILIRUB SERPL-MCNC: 0.3 MG/DL (ref 0.3–1.2)
BILIRUBIN URINE: NEGATIVE
BLOOD, URINE: ABNORMAL
BUN BLDV-MCNC: 32 MG/DL (ref 7–22)
C3 COMPLEMENT: 93 MG/DL (ref 90–180)
CALCIUM SERPL-MCNC: 9.3 MG/DL (ref 8.5–10.5)
CASTS: ABNORMAL /LPF
CHARACTER, URINE: ABNORMAL
CHLORIDE BLD-SCNC: 106 MEQ/L (ref 98–111)
CO2: 27 MEQ/L (ref 23–33)
COLOR: YELLOW
COMPLEMENT C4: 22 MG/DL (ref 10–40)
CREAT SERPL-MCNC: 1.4 MG/DL (ref 0.4–1.2)
CREATININE, URINE: 81.5 MG/DL
CRYSTALS: ABNORMAL
EPITHELIAL CELLS, UA: ABNORMAL /HPF
GFR SERPL CREATININE-BSD FRML MDRD: 37 ML/MIN/1.73M2
GLUCOSE BLD-MCNC: 97 MG/DL (ref 70–108)
GLUCOSE, URINE: NEGATIVE MG/DL
HEPATITIS C ANTIBODY: NEGATIVE
KETONES, URINE: NEGATIVE
LEUKOCYTE ESTERASE, URINE: ABNORMAL
MICROALBUMIN UR-MCNC: 49.79 MG/DL
MICROALBUMIN/CREAT UR-RTO: 611 MG/G (ref 0–30)
MISCELLANEOUS LAB TEST RESULT: ABNORMAL
MUCUS: ABNORMAL
NITRITE, URINE: NEGATIVE
PH UA: 6 (ref 5–9)
POTASSIUM SERPL-SCNC: 4.3 MEQ/L (ref 3.5–5.2)
PROTEIN UA: 100 MG/DL
PROTEIN, URINE: 161.3 MG/DL
RBC URINE: ABNORMAL /HPF
RENAL EPITHELIAL, UA: ABNORMAL
SODIUM BLD-SCNC: 142 MEQ/L (ref 135–145)
SPECIFIC GRAVITY UA: 1.01 (ref 1–1.03)
TOTAL PROTEIN: 7.1 G/DL (ref 6.1–8)
UROBILINOGEN, URINE: 0.2 EU/DL (ref 0–1)
WBC UA: > 200 /HPF
YEAST: ABNORMAL

## 2021-10-25 PROCEDURE — 84156 ASSAY OF PROTEIN URINE: CPT

## 2021-10-25 PROCEDURE — 80053 COMPREHEN METABOLIC PANEL: CPT

## 2021-10-25 PROCEDURE — 83516 IMMUNOASSAY NONANTIBODY: CPT

## 2021-10-25 PROCEDURE — 86160 COMPLEMENT ANTIGEN: CPT

## 2021-10-25 PROCEDURE — 83883 ASSAY NEPHELOMETRY NOT SPEC: CPT

## 2021-10-25 PROCEDURE — 86803 HEPATITIS C AB TEST: CPT

## 2021-10-25 PROCEDURE — 36415 COLL VENOUS BLD VENIPUNCTURE: CPT

## 2021-10-25 PROCEDURE — 81001 URINALYSIS AUTO W/SCOPE: CPT

## 2021-10-25 PROCEDURE — 82043 UR ALBUMIN QUANTITATIVE: CPT

## 2021-10-25 PROCEDURE — 86334 IMMUNOFIX E-PHORESIS SERUM: CPT

## 2021-10-25 PROCEDURE — 99214 OFFICE O/P EST MOD 30 MIN: CPT | Performed by: NURSE PRACTITIONER

## 2021-10-25 PROCEDURE — 86038 ANTINUCLEAR ANTIBODIES: CPT

## 2021-10-25 PROCEDURE — 86255 FLUORESCENT ANTIBODY SCREEN: CPT

## 2021-10-25 ASSESSMENT — ENCOUNTER SYMPTOMS
ABDOMINAL PAIN: 0
ABDOMINAL DISTENTION: 0
COUGH: 0
SHORTNESS OF BREATH: 1
WHEEZING: 0

## 2021-10-25 NOTE — PATIENT INSTRUCTIONS
You may receive a survey regarding the care you received during your visit. Your input is valuable to us. We encourage you to complete and return your survey. We hope you will choose us in the future for your healthcare needs. Continue:  · Continue current medications  · Daily weights and record  · Fluid restriction of 2 Liters per day  · Limit sodium in diet to around 3168-9503 mg/day  · Monitor BP  · Activity as tolerated     Call the Heart Failure Clinic for any of the following symptoms: [phone]   Weight gain of 2-3 pounds in 1 day or 5 pounds in 1 week   Increased shortness of breath   Shortness of breath while laying down   Cough   Chest pain   Swelling in feet, ankles or legs   Tenderness or bloating in the abdomen   Fatigue    Decreased appetite or nausea    Confusion       Stable, appears Euvolemic  Lab reviewed - stable  Repeat blood work in 1 year  BP/HR stable   No med changes today   Continue diet/fluid adherence  Continue daily wts. F/U w/ Cardiology  F/U in clinic in 1 year  Call [place] pulmonologist in regards to lower oxygen saturations levels.  Suggesting to be evaluated for supplemental O2

## 2021-10-26 ENCOUNTER — TELEPHONE (OUTPATIENT)
Dept: NEPHROLOGY | Age: 71
End: 2021-10-26

## 2021-10-26 DIAGNOSIS — N18.31 STAGE 3A CHRONIC KIDNEY DISEASE (HCC): Primary | ICD-10-CM

## 2021-10-26 NOTE — TELEPHONE ENCOUNTER
Patient notified. No UTI symptoms.  Will hold bumex 2-3 days and repeat bmp next weeks prior to appt

## 2021-10-26 NOTE — TELEPHONE ENCOUNTER
----- Message from Darleen Guerrier MD sent at 10/25/2021  7:33 PM EDT -----  Please check if the patient has any signs of UTI  Also advised to hold the Bumex for the next 2 to 3 days drink slightly more liquids  Repeat BMP again prior to my appointment next week

## 2021-10-27 ENCOUNTER — APPOINTMENT (OUTPATIENT)
Dept: ULTRASOUND IMAGING | Age: 71
DRG: 004 | End: 2021-10-27
Payer: MEDICARE

## 2021-10-27 ENCOUNTER — APPOINTMENT (OUTPATIENT)
Dept: GENERAL RADIOLOGY | Age: 71
DRG: 004 | End: 2021-10-27
Payer: MEDICARE

## 2021-10-27 ENCOUNTER — TELEPHONE (OUTPATIENT)
Dept: WOUND CARE | Age: 71
End: 2021-10-27

## 2021-10-27 ENCOUNTER — HOSPITAL ENCOUNTER (INPATIENT)
Age: 71
LOS: 35 days | Discharge: OTHER INSTITUTION | DRG: 004 | End: 2021-12-01
Attending: EMERGENCY MEDICINE | Admitting: INTERNAL MEDICINE
Payer: MEDICARE

## 2021-10-27 DIAGNOSIS — J81.0 FLASH PULMONARY EDEMA (HCC): ICD-10-CM

## 2021-10-27 DIAGNOSIS — J96.02 ACUTE RESPIRATORY FAILURE WITH HYPERCAPNIA (HCC): Primary | ICD-10-CM

## 2021-10-27 PROBLEM — J96.00 ACUTE RESPIRATORY FAILURE (HCC): Status: ACTIVE | Noted: 2021-10-27

## 2021-10-27 LAB
ALBUMIN SERPL-MCNC: 3 G/DL (ref 3.5–5.1)
ALLEN TEST: ABNORMAL
ALLEN TEST: NORMAL
ALP BLD-CCNC: 106 U/L (ref 38–126)
ALT SERPL-CCNC: 11 U/L (ref 11–66)
ANA SCREEN: DETECTED
ANION GAP SERPL CALCULATED.3IONS-SCNC: 10 MEQ/L (ref 8–16)
ANISOCYTOSIS: PRESENT
AST SERPL-CCNC: 25 U/L (ref 5–40)
ATYPICAL LYMPHOCYTES: ABNORMAL %
BACTERIA: ABNORMAL
BASE EXCESS (CALCULATED): 0.4 MMOL/L (ref -2.5–2.5)
BASE EXCESS (CALCULATED): 0.7 MMOL/L (ref -2.5–2.5)
BASOPHILIA: ABNORMAL
BASOPHILS # BLD: 0.6 %
BASOPHILS ABSOLUTE: 0.1 THOU/MM3 (ref 0–0.1)
BILIRUB SERPL-MCNC: 0.3 MG/DL (ref 0.3–1.2)
BILIRUBIN URINE: NEGATIVE
BLOOD, URINE: ABNORMAL
BUN BLDV-MCNC: 31 MG/DL (ref 7–22)
CALCIUM SERPL-MCNC: 9 MG/DL (ref 8.5–10.5)
CASTS: ABNORMAL /LPF
CASTS: ABNORMAL /LPF
CHARACTER, URINE: ABNORMAL
CHLORIDE BLD-SCNC: 101 MEQ/L (ref 98–111)
CO2: 25 MEQ/L (ref 23–33)
COLLECTED BY:: ABNORMAL
COLLECTED BY:: NORMAL
COLOR: YELLOW
CREAT SERPL-MCNC: 1.5 MG/DL (ref 0.4–1.2)
CRYSTALS: ABNORMAL
DEVICE: ABNORMAL
DEVICE: NORMAL
EKG ATRIAL RATE: 104 BPM
EKG P-R INTERVAL: 208 MS
EKG Q-T INTERVAL: 492 MS
EKG QRS DURATION: 70 MS
EKG QTC CALCULATION (BAZETT): 646 MS
EKG R AXIS: 123 DEGREES
EKG T AXIS: -44 DEGREES
EKG VENTRICULAR RATE: 104 BPM
EOSINOPHIL # BLD: 0.6 %
EOSINOPHILS ABSOLUTE: 0.1 THOU/MM3 (ref 0–0.4)
EPITHELIAL CELLS, UA: ABNORMAL /HPF
ERYTHROCYTE [DISTWIDTH] IN BLOOD BY AUTOMATED COUNT: 16.9 % (ref 11.5–14.5)
ERYTHROCYTE [DISTWIDTH] IN BLOOD BY AUTOMATED COUNT: 52.2 FL (ref 35–45)
GFR SERPL CREATININE-BSD FRML MDRD: 34 ML/MIN/1.73M2
GLUCOSE BLD-MCNC: 138 MG/DL (ref 70–108)
GLUCOSE, FLUID: 143 MG/DL
GLUCOSE, URINE: NEGATIVE MG/DL
HCO3: 26 MMOL/L (ref 23–28)
HCO3: 31 MMOL/L (ref 23–28)
HCT VFR BLD CALC: 33.6 % (ref 37–47)
HEMOGLOBIN: 9.6 GM/DL (ref 12–16)
HYPOCHROMIA: PRESENT
IFIO2: 100
IFIO2: 40
IMMATURE GRANS (ABS): 0.09 THOU/MM3 (ref 0–0.07)
IMMATURE GRANULOCYTES: 0.8 %
INR BLD: 1.13 (ref 0.85–1.13)
KAPPA/LAMBDA FREE LIGHT CHAINS: NORMAL
KETONES, URINE: NEGATIVE
LACTIC ACID, SEPSIS: 0.8 MMOL/L (ref 0.5–1.9)
LD, FLUID: 114 U/L
LD: 151 U/L (ref 100–190)
LEUKOCYTE EST, POC: ABNORMAL
LYMPHOCYTES # BLD: 13.6 %
LYMPHOCYTES ABSOLUTE: 1.5 THOU/MM3 (ref 1–4.8)
MCH RBC QN AUTO: 24.6 PG (ref 26–33)
MCHC RBC AUTO-ENTMCNC: 28.6 GM/DL (ref 32.2–35.5)
MCV RBC AUTO: 85.9 FL (ref 81–99)
MISCELLANEOUS LAB TEST RESULT: ABNORMAL
MODE: NORMAL
MONOCYTES # BLD: 9 %
MONOCYTES ABSOLUTE: 1 THOU/MM3 (ref 0.4–1.3)
NITRITE, URINE: NEGATIVE
NUCLEATED RED BLOOD CELLS: 0 /100 WBC
O2 SATURATION: 96 %
O2 SATURATION: 98 %
OSMOLALITY CALCULATION: 280.7 MOSMOL/KG (ref 275–300)
PCO2: 42 MMHG (ref 35–45)
PCO2: 80 MMHG (ref 35–45)
PH BLOOD GAS: 7.19 (ref 7.35–7.45)
PH BLOOD GAS: 7.39 (ref 7.35–7.45)
PH FLUID: 7.43
PH UA: 5.5 (ref 5–9)
PIP: 26 CMH2O
PLATELET # BLD: 297 THOU/MM3 (ref 130–400)
PLATELET ESTIMATE: ADEQUATE
PMV BLD AUTO: 9.2 FL (ref 9.4–12.4)
PO2: 134 MMHG (ref 71–104)
PO2: 82 MMHG (ref 71–104)
POIKILOCYTES: ABNORMAL
POTASSIUM REFLEX MAGNESIUM: 5.3 MEQ/L (ref 3.5–5.2)
PRO-BNP: 972.1 PG/ML (ref 0–900)
PROCALCITONIN: 0.24 NG/ML (ref 0.01–0.09)
PROTEIN FLUID: 4.3 GM/DL
PROTEIN UA: 300 MG/DL
RBC # BLD: 3.91 MILL/MM3 (ref 4.2–5.4)
RBC URINE: ABNORMAL /HPF
RENAL EPITHELIAL, UA: ABNORMAL
SARS-COV-2, NAAT: NOT  DETECTED
SCAN OF BLOOD SMEAR: NORMAL
SEG NEUTROPHILS: 75.4 %
SEGMENTED NEUTROPHILS ABSOLUTE COUNT: 8.1 THOU/MM3 (ref 1.8–7.7)
SET PEEP: 8 MMHG
SET PRESS SUPP: 18 CMH2O
SET RESPIRATORY RATE: 16 BPM
SMUDGE CELLS: PRESENT
SODIUM BLD-SCNC: 136 MEQ/L (ref 135–145)
SOURCE, BLOOD GAS: ABNORMAL
SOURCE, BLOOD GAS: NORMAL
SPECIFIC GRAVITY UA: 1.02 (ref 1–1.03)
TOTAL PROTEIN: 6.9 G/DL (ref 6.1–8)
TROPONIN T: 0.06 NG/ML
UROBILINOGEN, URINE: 0.2 EU/DL (ref 0–1)
WBC # BLD: 10.7 THOU/MM3 (ref 4.8–10.8)
WBC UA: ABNORMAL /HPF
YEAST: ABNORMAL

## 2021-10-27 PROCEDURE — 51702 INSERT TEMP BLADDER CATH: CPT

## 2021-10-27 PROCEDURE — 88112 CYTOPATH CELL ENHANCE TECH: CPT

## 2021-10-27 PROCEDURE — 2580000003 HC RX 258

## 2021-10-27 PROCEDURE — 71045 X-RAY EXAM CHEST 1 VIEW: CPT

## 2021-10-27 PROCEDURE — 87150 DNA/RNA AMPLIFIED PROBE: CPT

## 2021-10-27 PROCEDURE — 94660 CPAP INITIATION&MGMT: CPT

## 2021-10-27 PROCEDURE — 36600 WITHDRAWAL OF ARTERIAL BLOOD: CPT

## 2021-10-27 PROCEDURE — 87631 RESP VIRUS 3-5 TARGETS: CPT

## 2021-10-27 PROCEDURE — 83986 ASSAY PH BODY FLUID NOS: CPT

## 2021-10-27 PROCEDURE — 84157 ASSAY OF PROTEIN OTHER: CPT

## 2021-10-27 PROCEDURE — 87486 CHLMYD PNEUM DNA AMP PROBE: CPT

## 2021-10-27 PROCEDURE — 96365 THER/PROPH/DIAG IV INF INIT: CPT

## 2021-10-27 PROCEDURE — 6360000002 HC RX W HCPCS: Performed by: STUDENT IN AN ORGANIZED HEALTH CARE EDUCATION/TRAINING PROGRAM

## 2021-10-27 PROCEDURE — 87086 URINE CULTURE/COLONY COUNT: CPT

## 2021-10-27 PROCEDURE — 87205 SMEAR GRAM STAIN: CPT

## 2021-10-27 PROCEDURE — 6360000002 HC RX W HCPCS: Performed by: EMERGENCY MEDICINE

## 2021-10-27 PROCEDURE — 83615 LACTATE (LD) (LDH) ENZYME: CPT

## 2021-10-27 PROCEDURE — 94761 N-INVAS EAR/PLS OXIMETRY MLT: CPT

## 2021-10-27 PROCEDURE — 2500000003 HC RX 250 WO HCPCS: Performed by: EMERGENCY MEDICINE

## 2021-10-27 PROCEDURE — 88305 TISSUE EXAM BY PATHOLOGIST: CPT

## 2021-10-27 PROCEDURE — 84145 PROCALCITONIN (PCT): CPT

## 2021-10-27 PROCEDURE — 84484 ASSAY OF TROPONIN QUANT: CPT

## 2021-10-27 PROCEDURE — 81001 URINALYSIS AUTO W/SCOPE: CPT

## 2021-10-27 PROCEDURE — 2500000003 HC RX 250 WO HCPCS

## 2021-10-27 PROCEDURE — 89050 BODY FLUID CELL COUNT: CPT

## 2021-10-27 PROCEDURE — 99291 CRITICAL CARE FIRST HOUR: CPT | Performed by: FAMILY MEDICINE

## 2021-10-27 PROCEDURE — 87798 DETECT AGENT NOS DNA AMP: CPT

## 2021-10-27 PROCEDURE — 32555 ASPIRATE PLEURA W/ IMAGING: CPT

## 2021-10-27 PROCEDURE — 82803 BLOOD GASES ANY COMBINATION: CPT

## 2021-10-27 PROCEDURE — 6360000002 HC RX W HCPCS

## 2021-10-27 PROCEDURE — 87635 SARS-COV-2 COVID-19 AMP PRB: CPT

## 2021-10-27 PROCEDURE — 87581 M.PNEUMON DNA AMP PROBE: CPT

## 2021-10-27 PROCEDURE — 94002 VENT MGMT INPAT INIT DAY: CPT

## 2021-10-27 PROCEDURE — 36592 COLLECT BLOOD FROM PICC: CPT

## 2021-10-27 PROCEDURE — 83605 ASSAY OF LACTIC ACID: CPT

## 2021-10-27 PROCEDURE — 87541 LEGION PNEUMO DNA AMP PROB: CPT

## 2021-10-27 PROCEDURE — 87081 CULTURE SCREEN ONLY: CPT

## 2021-10-27 PROCEDURE — 85610 PROTHROMBIN TIME: CPT

## 2021-10-27 PROCEDURE — 6360000002 HC RX W HCPCS: Performed by: FAMILY MEDICINE

## 2021-10-27 PROCEDURE — 99285 EMERGENCY DEPT VISIT HI MDM: CPT

## 2021-10-27 PROCEDURE — 36415 COLL VENOUS BLD VENIPUNCTURE: CPT

## 2021-10-27 PROCEDURE — 83880 ASSAY OF NATRIURETIC PEPTIDE: CPT

## 2021-10-27 PROCEDURE — 87641 MR-STAPH DNA AMP PROBE: CPT

## 2021-10-27 PROCEDURE — 93005 ELECTROCARDIOGRAM TRACING: CPT | Performed by: EMERGENCY MEDICINE

## 2021-10-27 PROCEDURE — 87040 BLOOD CULTURE FOR BACTERIA: CPT

## 2021-10-27 PROCEDURE — 2580000003 HC RX 258: Performed by: EMERGENCY MEDICINE

## 2021-10-27 PROCEDURE — 87075 CULTR BACTERIA EXCEPT BLOOD: CPT

## 2021-10-27 PROCEDURE — 87500 VANOMYCIN DNA AMP PROBE: CPT

## 2021-10-27 PROCEDURE — 87116 MYCOBACTERIA CULTURE: CPT

## 2021-10-27 PROCEDURE — 2700000000 HC OXYGEN THERAPY PER DAY

## 2021-10-27 PROCEDURE — 96375 TX/PRO/DX INJ NEW DRUG ADDON: CPT

## 2021-10-27 PROCEDURE — 82945 GLUCOSE OTHER FLUID: CPT

## 2021-10-27 PROCEDURE — 93005 ELECTROCARDIOGRAM TRACING: CPT | Performed by: FAMILY MEDICINE

## 2021-10-27 PROCEDURE — 83735 ASSAY OF MAGNESIUM: CPT

## 2021-10-27 PROCEDURE — 96367 TX/PROPH/DG ADDL SEQ IV INF: CPT

## 2021-10-27 PROCEDURE — 80053 COMPREHEN METABOLIC PANEL: CPT

## 2021-10-27 PROCEDURE — 31500 INSERT EMERGENCY AIRWAY: CPT

## 2021-10-27 PROCEDURE — 2580000003 HC RX 258: Performed by: FAMILY MEDICINE

## 2021-10-27 PROCEDURE — 2000000000 HC ICU R&B

## 2021-10-27 PROCEDURE — 85025 COMPLETE CBC W/AUTO DIFF WBC: CPT

## 2021-10-27 PROCEDURE — 87070 CULTURE OTHR SPECIMN AEROBIC: CPT

## 2021-10-27 PROCEDURE — 05HM33Z INSERTION OF INFUSION DEVICE INTO RIGHT INTERNAL JUGULAR VEIN, PERCUTANEOUS APPROACH: ICD-10-PCS

## 2021-10-27 RX ORDER — FENTANYL CITRATE 50 UG/ML
50 INJECTION, SOLUTION INTRAMUSCULAR; INTRAVENOUS ONCE
Status: COMPLETED | OUTPATIENT
Start: 2021-10-27 | End: 2021-10-27

## 2021-10-27 RX ORDER — ETOMIDATE 2 MG/ML
INJECTION INTRAVENOUS DAILY PRN
Status: COMPLETED | OUTPATIENT
Start: 2021-10-27 | End: 2021-10-27

## 2021-10-27 RX ORDER — HEPARIN SODIUM 5000 [USP'U]/ML
5000 INJECTION, SOLUTION INTRAVENOUS; SUBCUTANEOUS EVERY 8 HOURS SCHEDULED
Status: DISCONTINUED | OUTPATIENT
Start: 2021-10-27 | End: 2021-10-28

## 2021-10-27 RX ORDER — FLUOXETINE HYDROCHLORIDE 20 MG/1
40 CAPSULE ORAL DAILY
Status: DISCONTINUED | OUTPATIENT
Start: 2021-10-28 | End: 2021-12-01 | Stop reason: HOSPADM

## 2021-10-27 RX ORDER — SODIUM CHLORIDE 0.9 % (FLUSH) 0.9 %
5-40 SYRINGE (ML) INJECTION PRN
Status: DISCONTINUED | OUTPATIENT
Start: 2021-10-27 | End: 2021-11-24 | Stop reason: SDUPTHER

## 2021-10-27 RX ORDER — SODIUM CHLORIDE 9 MG/ML
INJECTION, SOLUTION INTRAVENOUS CONTINUOUS
Status: DISCONTINUED | OUTPATIENT
Start: 2021-10-27 | End: 2021-12-01 | Stop reason: HOSPADM

## 2021-10-27 RX ORDER — SODIUM CHLORIDE 0.9 % (FLUSH) 0.9 %
10 SYRINGE (ML) INJECTION EVERY 12 HOURS SCHEDULED
Status: DISCONTINUED | OUTPATIENT
Start: 2021-10-27 | End: 2021-10-27 | Stop reason: SDUPTHER

## 2021-10-27 RX ORDER — SUCCINYLCHOLINE CHLORIDE 20 MG/ML
INJECTION INTRAMUSCULAR; INTRAVENOUS DAILY PRN
Status: COMPLETED | OUTPATIENT
Start: 2021-10-27 | End: 2021-10-27

## 2021-10-27 RX ORDER — BUMETANIDE 0.25 MG/ML
1 INJECTION, SOLUTION INTRAMUSCULAR; INTRAVENOUS DAILY
Status: DISCONTINUED | OUTPATIENT
Start: 2021-10-27 | End: 2021-10-27

## 2021-10-27 RX ORDER — SODIUM CHLORIDE 0.9 % (FLUSH) 0.9 %
5-40 SYRINGE (ML) INJECTION EVERY 12 HOURS SCHEDULED
Status: DISCONTINUED | OUTPATIENT
Start: 2021-10-27 | End: 2021-11-24 | Stop reason: SDUPTHER

## 2021-10-27 RX ORDER — ASPIRIN 81 MG/1
81 TABLET ORAL DAILY
Status: DISCONTINUED | OUTPATIENT
Start: 2021-10-28 | End: 2021-11-02

## 2021-10-27 RX ORDER — DEXMEDETOMIDINE HYDROCHLORIDE 4 UG/ML
.2-1.4 INJECTION, SOLUTION INTRAVENOUS CONTINUOUS
Status: DISCONTINUED | OUTPATIENT
Start: 2021-10-27 | End: 2021-10-27

## 2021-10-27 RX ORDER — SODIUM CHLORIDE 0.9 % (FLUSH) 0.9 %
10 SYRINGE (ML) INJECTION PRN
Status: DISCONTINUED | OUTPATIENT
Start: 2021-10-27 | End: 2021-10-27 | Stop reason: SDUPTHER

## 2021-10-27 RX ORDER — NOREPINEPHRINE BIT/0.9 % NACL 16MG/250ML
INFUSION BOTTLE (ML) INTRAVENOUS
Status: COMPLETED
Start: 2021-10-27 | End: 2021-10-27

## 2021-10-27 RX ORDER — MAGNESIUM SULFATE IN WATER 40 MG/ML
2000 INJECTION, SOLUTION INTRAVENOUS ONCE
Status: DISCONTINUED | OUTPATIENT
Start: 2021-10-28 | End: 2021-10-28

## 2021-10-27 RX ORDER — NITROGLYCERIN 20 MG/100ML
INJECTION INTRAVENOUS
Status: COMPLETED
Start: 2021-10-27 | End: 2021-10-27

## 2021-10-27 RX ORDER — PROPOFOL 10 MG/ML
INJECTION, EMULSION INTRAVENOUS
Status: COMPLETED
Start: 2021-10-27 | End: 2021-10-27

## 2021-10-27 RX ORDER — DOXYCYCLINE HYCLATE 100 MG
100 TABLET ORAL EVERY 12 HOURS SCHEDULED
Status: DISCONTINUED | OUTPATIENT
Start: 2021-10-27 | End: 2021-10-28

## 2021-10-27 RX ORDER — ALLOPURINOL 300 MG/1
300 TABLET ORAL DAILY
Status: DISCONTINUED | OUTPATIENT
Start: 2021-10-28 | End: 2021-11-13

## 2021-10-27 RX ORDER — NITROGLYCERIN 20 MG/100ML
5-200 INJECTION INTRAVENOUS CONTINUOUS
Status: DISCONTINUED | OUTPATIENT
Start: 2021-10-27 | End: 2021-10-27

## 2021-10-27 RX ORDER — PROPOFOL 10 MG/ML
10 INJECTION, EMULSION INTRAVENOUS ONCE
Status: COMPLETED | OUTPATIENT
Start: 2021-10-27 | End: 2021-10-27

## 2021-10-27 RX ORDER — MIDAZOLAM HYDROCHLORIDE 1 MG/ML
2 INJECTION INTRAMUSCULAR; INTRAVENOUS ONCE
Status: COMPLETED | OUTPATIENT
Start: 2021-10-27 | End: 2021-10-27

## 2021-10-27 RX ORDER — SODIUM CHLORIDE 9 MG/ML
25 INJECTION, SOLUTION INTRAVENOUS PRN
Status: DISCONTINUED | OUTPATIENT
Start: 2021-10-27 | End: 2021-10-28

## 2021-10-27 RX ORDER — BUMETANIDE 0.25 MG/ML
1 INJECTION, SOLUTION INTRAMUSCULAR; INTRAVENOUS DAILY
Status: DISCONTINUED | OUTPATIENT
Start: 2021-10-28 | End: 2021-10-29

## 2021-10-27 RX ORDER — PROPOFOL 10 MG/ML
5-50 INJECTION, EMULSION INTRAVENOUS
Status: DISCONTINUED | OUTPATIENT
Start: 2021-10-27 | End: 2021-10-27

## 2021-10-27 RX ORDER — MIDAZOLAM HYDROCHLORIDE 1 MG/ML
2 INJECTION INTRAMUSCULAR; INTRAVENOUS
Status: DISCONTINUED | OUTPATIENT
Start: 2021-10-27 | End: 2021-10-27

## 2021-10-27 RX ORDER — FENTANYL CITRATE 50 UG/ML
50 INJECTION, SOLUTION INTRAMUSCULAR; INTRAVENOUS ONCE
Status: DISCONTINUED | OUTPATIENT
Start: 2021-10-27 | End: 2021-10-27

## 2021-10-27 RX ORDER — NOREPINEPHRINE BIT/0.9 % NACL 16MG/250ML
2-100 INFUSION BOTTLE (ML) INTRAVENOUS CONTINUOUS
Status: DISCONTINUED | OUTPATIENT
Start: 2021-10-27 | End: 2021-11-03

## 2021-10-27 RX ORDER — SODIUM CHLORIDE 9 MG/ML
25 INJECTION, SOLUTION INTRAVENOUS PRN
Status: DISCONTINUED | OUTPATIENT
Start: 2021-10-27 | End: 2021-10-27

## 2021-10-27 RX ADMIN — ETOMIDATE 20 MG: 2 INJECTION INTRAVENOUS at 14:35

## 2021-10-27 RX ADMIN — SUCCINYLCHOLINE CHLORIDE 100 MG: 20 INJECTION, SOLUTION INTRAMUSCULAR; INTRAVENOUS at 14:35

## 2021-10-27 RX ADMIN — PROPOFOL 10 MG: 10 INJECTION, EMULSION INTRAVENOUS at 14:53

## 2021-10-27 RX ADMIN — HEPARIN SODIUM 5000 UNITS: 5000 INJECTION INTRAVENOUS; SUBCUTANEOUS at 22:52

## 2021-10-27 RX ADMIN — SODIUM CHLORIDE, PRESERVATIVE FREE 10 ML: 5 INJECTION INTRAVENOUS at 22:00

## 2021-10-27 RX ADMIN — PROPOFOL 5 MCG/KG/MIN: 10 INJECTION, EMULSION INTRAVENOUS at 14:45

## 2021-10-27 RX ADMIN — FENTANYL CITRATE 50 MCG: 50 INJECTION, SOLUTION INTRAMUSCULAR; INTRAVENOUS at 14:51

## 2021-10-27 RX ADMIN — FENTANYL CITRATE 50 MCG: 50 INJECTION, SOLUTION INTRAMUSCULAR; INTRAVENOUS at 15:34

## 2021-10-27 RX ADMIN — Medication 2 MCG/MIN: at 18:14

## 2021-10-27 RX ADMIN — SODIUM CHLORIDE 0.2 MCG/KG/HR: 9 INJECTION, SOLUTION INTRAVENOUS at 15:58

## 2021-10-27 RX ADMIN — Medication 25 MCG/HR: at 14:45

## 2021-10-27 RX ADMIN — Medication 100 MCG/HR: at 20:11

## 2021-10-27 RX ADMIN — SODIUM CHLORIDE: 9 INJECTION, SOLUTION INTRAVENOUS at 22:56

## 2021-10-27 RX ADMIN — NITROGLYCERIN 5 MCG/MIN: 20 INJECTION INTRAVENOUS at 14:20

## 2021-10-27 RX ADMIN — MIDAZOLAM 2 MG: 1 INJECTION INTRAMUSCULAR; INTRAVENOUS at 15:40

## 2021-10-27 ASSESSMENT — ENCOUNTER SYMPTOMS
CHEST TIGHTNESS: 0
PHOTOPHOBIA: 0
BACK PAIN: 0
EYE PAIN: 0
BLOOD IN STOOL: 0
CONSTIPATION: 0
VOMITING: 0
DIARRHEA: 0
NAUSEA: 0
SHORTNESS OF BREATH: 1

## 2021-10-27 ASSESSMENT — PULMONARY FUNCTION TESTS
PIF_VALUE: 22
PIF_VALUE: 26
PIF_VALUE: 28

## 2021-10-27 NOTE — PROGRESS NOTES
The transport originated from ER. Pt. was transported to [address] [place] [address]. Assisting with the transport was Naima Fuentes RN. A defibrillator was brought along on transport. Appropriate devices were applied to monitor the patient's condition during transport. A transport backpack was brought on transport, to be used in case of emergency. Patient transported  via 65% O2 via ventilator. Patient tolerated procedure well. Sat's 98%   Report given to Flor Perry RCP.

## 2021-10-27 NOTE — ED PROVIDER NOTES
[address]    Pt Name: Miguel Ángel Chau  MRN: [id number]  [place] 1950  Date of evaluation: 9/12/20      I personally saw and examined the patient. I have reviewed and agree with the Resident findings, including all diagnostic interpretations and treatment plans as written. I was present for the key portion of any procedures performed and the inclusive time noted in any critical care statement. History: This patient was seen with Jennifer Rivera, resident physician. This 65-year-old female arrives in acute respiratory failure, ?-actually by private vehicle, appears to having worsening shortness of breath throughout the day and saturations actually going as low as into the 50s. She also has a blood gas showing acute hypercarbia with respiratory acidosis. After a brief trial of BiPAP, she was intubated by my resident under my direct supervision. Prior history of CHF, pulmonary hypertension       Although my resident was able to get the intubation on the first attempt,  it should be noted that this patient had a difficult airway. Cords are very anterior even when using the glide scope. We made adjustments to the positioning of the tube and her saturations are back in the high 90s. Chest x-ray shows a huge pleural effusion on the left side with the left side almost being completely hernan out. Interventional radiology was consulted and Dr. Brandon Helton was able to obtain 1 L of pleural fluid which has been sent off for evaluation. Patient has poor IV access and we ended up placing a central line right IJ which again was done by my resident under my supervision. See his separate note for that. We have her on Precedex and fentanyl drips for sedation. She has tested negative for Covid. I discussed the case with Dr. Norman Del Toro to arrange admission to the intensive care unit.     CRITICAL CARE: There was a high probability of clinically significant/life threatening deterioration in this patient's condition which required my urgent intervention. Total critical care time was 30 minutes. This excludes any time for separately reportable procedures. Diagnosis: Acute hypoxemic and hypercarbic respiratory failure.   Large right pleural effusion    Admitted to the intensive care unit                    Moses Cornelius DO  10/27/21 [id number]

## 2021-10-27 NOTE — ED NOTES
Dr Gerda Dwyer and Dr. Singletary Stendal at bedside for ultrasound central line placement.       Bryan Saleh RN  10/27/21 [id number]

## 2021-10-27 NOTE — H&P
Formulation and discussion of sedation / procedure plans, risks, benefits, side effects and alternatives with patient and/or responsible adult completed.     Electronically signed by Den Arambula MD on 10/27/2021 at 4:32 PM

## 2021-10-27 NOTE — ED NOTES
Dr. Pravin Linton at bedside to discuss intubation with pt and family. Pt and family agree and consent. Pt moved to room 4 and preparing to intubate.       Aliya Quinn RN  10/27/21 1029

## 2021-10-27 NOTE — ED TRIAGE NOTES
Pt to the ED via family with c/o SOB. Pt's family states this started today. Pt was taken off her Bumex which she did not take yesterday. Pt O2 sat is 41% upon ED arrival on room air. Pt's placed on 15LNRB. O2 sat increased to 98%. Pt's breathing is noticeably labored.

## 2021-10-27 NOTE — H&P
ICU History & Physical       Patient: Beata Wu  YOB: 1950    MRN: [id number]     Acct: [de-identified]    PCP: Curtis Lamas MD    Date of Admission: 10/27/2021    Date of Service: Patient seen / examined on 10/28/21 and admitted to inpatient with expected LOS greater than two midnights due to medical therapy. ASSESSMENT / PLAN:    Acute hypoxemic and hypercapnic respiratory failure  Requiring emergent intubation and mechanical ventilation. Secondary to L-sided pleural effusion, with multi-organism pneumonia. Currently on PCV. Lung protective strategies initiated. Wean as tolerated. Multi-organism pneumonia  MRSA, adenovirus present on molecular pneumonia panel. Vancomycin initiated. Respiratory culture pending. Appropriate precautions in place. Large L-sided pleural effusion  S/p US-guided thoracentesis on 10/27 with 1100 cc yellow serous fluid removed. Exudative by Light's Criteria. Initial gram stain negative. Cell count unremarkable. Noted positive RYLAN screen 10/25/21 - ?lupus pleuritis, however pleural glucose is 143. Awaiting final cultures and cytology. Daily portable CXR to monitor for recurrence. Severe PAH / chronic RV failure / NYHA II  EF 55-60%, G2DD per TTE 5/4/21. PA 85/31 (mean 52), PVR 10. ?CTEPH (on adempas). Follows with cardiology (Dr. Alan Crespo) and [facility]. Outpatient bumex recently discontinued. On BB. Adempas resumed. Undifferentiated shock  Suspect septic etiology, but with noted hx RV failure/severe PAH. BNP normal. Trace edema to BLE. Mild vascular congestion on CXR. LA normal. Procal 0.24. Initiate pressor support to maintain MAP > 65. Providing judicious IVF administration at 50 cc/hr. Blood and respiratory cultures pending. Repeat TTE. Plan to reintroduce diuretic therapy as indicated. Strict I/O, daily weights. May benefit from SG catheter placement and RHC pending ongoing clinical course. Mild AS  Valve area 1.7 sq cm (5/2021).  Murmur the day / recorded SpO2 51% on home pulse oximeter prior to arrival. The patient has a known history of CHF and PAH, for which she follows with Dr. Thiago Daly (cardiology) and [facility] / currently on adempas. Per report, she was recently discontinued from oral bumex, which she stopped taking just this morning. She endorsed mild headache on arrival (poorly characterized), but no associated fevers/chills, chest pain, palpitations, cough, n/v/d, abdominal pain, urinary symptoms, weakness or syncope were reported. Limited additional history available. Upon ED arrival, the patient's SpO2 was 41% on RA. She was placed on NRB and subsequently intubated. Workup was remarkable for: Hgb 9.6 (MCV 85.9), K 5.3, Cr 1.5 (BUN 31 / eGFR 34), , Alb 3.0. Trop 0.059. Pro-. LA 0.8. COVID negative. pH 7.19 / 80 / 124 / 31. UA sample was contaminated. Portable CXR revealed pulmonary vascular congestion and near-complete opacification of the L hemithorax, along with a small R pleural effusion + RLL consolidation obscuring visualization of the R hemidiaphragm. She underwent L-sided US-guided thoracentesis by IR (Dr. Cathleen Cerna), with 1100 cc clear/yellow fluid removed. A RIJ CVC was also placed. The patient was subsequently transferred to the ICU for additional workup and management. Levophed was initiated following arrival to ICU for MAP < 65. Please refer to A&P for additional details.     Past Medical History:    Diagnosis Date    CHF (congestive heart failure) ([facility])     Dr. Phyllis Posey Depression     Gout     Hypertension     Osteoarthritis     Pulmonary artery hypertension ([facility] [address]     [facility] - Dr. Mary Jarrell Renal calculi     Dr. Clarita Rosenberg Obesity      Past Surgical History:    Procedure Laterality Date   [address] Right 8/6/2021    EXCISION RIGHT BUTTOCK WOUND AND CLOSURE performed by Alyssa Chapin MD at [place] [facility]      Medications Prior to Admission:  Medication Sig    metoprolol tartrate (LOPRESSOR) 25 MG tablet Take 0.5 tablets by mouth 2 times daily    sodium hypochlorite (DAKINS) 0.125 % SOLN external solution Apply Dakin's moistened gauze dressings to wound twice daily and as needed.  sodium chloride 1 g tablet Take 1 tablet by mouth 2 times daily    allopurinol (ZYLOPRIM) 300 MG tablet Take 1 tablet by mouth daily    FLUoxetine (PROZAC) 40 MG capsule Take 1 capsule by mouth daily    potassium chloride (KLOR-CON M) 10 MEQ extended release tablet Take 1 tablet by mouth every other day    bumetanide (BUMEX) 1 MG tablet Take 1 tablet by mouth daily    Multiple Vitamins-Minerals (MULTIVITAMIN WOMEN PO) Take 1 tablet by mouth daily    acetaminophen (TYLENOL) 650 MG extended release tablet Take 650 mg by mouth every 8 hours as needed for Pain    Riociguat (ADEMPAS) 2.5 MG TABS Take 2.5 mg by mouth 3 times daily    docusate sodium (COLACE) 100 MG capsule Take 100 mg by mouth as needed     aspirin 81 MG tablet Take 81 mg by mouth daily      Allergies:   Patient has no known allergies. Social History:  Socioeconomic History    Marital status: Single   Tobacco Use    Smoking status: Never Smoker    Smokeless tobacco: Never Used   Vaping Use    Vaping Use: Never used   Substance and Sexual Activity    Alcohol use: No    Drug use: No     Family History:   Problem Relation    Diabetes Father    Arthritis Mother    COPD Mother    Diabetes Sister    Heart Disease Maternal Uncle    Breast Cancer Niece    Sleep Apnea Brother     REVIEW OF SYSTEMS:  Unable to obtain (intubated / sedated on the ventilator)    PHYSICAL EXAM:  Vitals:    10/28/21 0215 10/28/21 0230 10/28/21 0245 10/28/21 0354   BP: (!) 116/52 121/68 (!) 113/49    Pulse: 89 81 81 81   Resp: 17 15 16 16   Temp:       TempSrc:       SpO2: 91% 91% 94% 95%   Weight:         General appearance: Non-toxic-appearing  female.  Intubated / sedated on the mechanical ventilator. No apparent distress. HEENT: Normocephalic / atraumatic. Pupils pinpoint (on fentanyl) and reactive to light. Conjunctivae appear normal. ET/OG in place. Neck: Supple. No JVD. Trachea midline. Respiratory: Breathing in sync with the ventilator. Lung sounds are slightly diminished and coarse to auscultation bilaterally. No wheezes / obvious rales. Cardiovascular: RRR. S1/S2 + ? S4. Systolic murmur present / heard best over RUSB. Abdomen: Obese. Soft / non-distended. No apparent tenderness to palpation. BS present. Musculoskeletal: No cyanosis. 1+ pitting edema to BLE. Skin: Warm / dry. No pallor / diaphoresis. Stage 3 ischial decubitus ulcer with tunneling noted -- healing appropriately / site C/D/I. Neurologic: Sedated. No involuntary movements. Psychiatric: Unable to assess. Capillary refill: Brisk bilaterally. Peripheral pulses: Normal bilaterally. Labs:   Results for orders placed or performed during the hospital encounter of 10/27/21   COVID-19, Rapid    Specimen: Nasopharyngeal Swab   Result Value Ref Range    SARS-CoV-2, NAAT NOT  DETECTED NOT DETECTED   Culture, Blood 1    Specimen: Blood   Result Value Ref Range    Blood Culture, Routine No growth-preliminary     Culture, Blood 2    Specimen: Blood   Result Value Ref Range    Blood Culture, Routine No growth-preliminary     Culture, Body Fluid    Specimen: Pleural Fluid; Body Fluid   Result Value Ref Range    Gram Stain Result       Few segmented neutrophils observed. No organisms observed.  performed on cytospun specimen    VRE Screen by PCR    Specimen: Rectal Swab   Result Value Ref Range    Vancomycin Resistant Enterococcus NEGATIVE    Pneumonia Panel, Molecular    Specimen: Sputum   Result Value Ref Range    Source ET aspirates     Specimen Acceptability NA     Acinetobacter calcoaceticus-baumannii by PCR Not Detected Not Detected    Enterobacter cloacae complex by PCR Not Detected Not Detected    Escherichia coli by PCR Not Detected Not Detected    Haemophilus Influenzae by PCR Not Detected Not Detected    Klebsiella aerogenes by PCR Not Detected Not Detected    Klebsiella oxytoca by PCR Not Detected Not Detected    Klebsiella pneumoniae group by PCR Not Detected Not Detected    Moraxella catarrhalis by PCR Not Detected Not Detected    Proteus species by PCR Not Detected Not Detected    Pseudomonas aeruginosa by PCR Not Detected Not Detected    Serratia marcescens by PCR Not Detected Not Detected    Staph aureus by PCR Detected (A) Not Detected    Strep agalactiae by PCR Not Detected Not Detected    Strep pneumoniae by PCR Not Detected Not Detected    Strep pyogenes by PCR Not Detected Not Detected    Resistant gene ctx-m by PCR N/A Not Detected    Resistant gene imp by PCR N/A Not Detected    Resistant gene kpc by PCR N/A Not Detected    Resistant gene meca/c & mrej by PCR Detected (A) Not Detected    Resistant gene ndm by PCR N/A Not Detected    Resistant gene oxa-48-like by pcr N/A Not Detected    Resistant gene vim by PCR N/A Not Detected    Chlamydia pneumoniae By PCR Not Detected Not Detected    Legionella Pneumophilia By PCR Not Detected Not Detected    Mycoplasma pneumoniae by PCR Not Detected Not Detected    Adenovirus by PCR Detected (A) Not Detected    Non-SARS Coronavirus Not Detected Not Detected    Metapneumovirus by PCR Not Detected Not Detected    Rhinovirus Enterovirus PCR Not Detected Not Detected    Influenza A by PCR Not Detected Not Detected    Influenza B by PCR Not Detected Not Detected    Parainfluenza virus by PCR Not Detected Not Detected    Respiratory Syncytial Virus by PCR Not Detected Not Detected   Blood gas, arterial   Result Value Ref Range    pH, Blood Gas 7.19 (LL) 7.35 - 7.45    PCO2 80 (HH) 35 - 45 mmhg    PO2 134 (H) 71 - 104 mmhg    HCO3 31 (H) 23 - 28 mmol/l    Base Excess (Calculated) 0.7 -2.5 - 2.5 mmol/l    O2 Sat 98 %    IFIO2 100     DEVICE NRB     David Test N/A     Source: [employer] COLLECTED BY: [id number]    Lactate, Sepsis   Result Value Ref Range    Lactic Acid, Sepsis 0.8 0.5 - 1.9 mmol/L   CBC Auto Differential   Result Value Ref Range    WBC 10.7 4.8 - 10.8 thou/mm3    RBC 3.91 (L) 4.20 - 5.40 mill/mm3    Hemoglobin 9.6 (L) 12.0 - 16.0 gm/dl    Hematocrit 33.6 (L) 37.0 - 47.0 %    MCV 85.9 81.0 - 99.0 fL    MCH 24.6 (L) 26.0 - 33.0 pg    MCHC 28.6 (L) 32.2 - 35.5 gm/dl    RDW-CV 16.9 (H) 11.5 - 14.5 %    RDW-SD 52.2 (H) 35.0 - 45.0 fL    Platelets 379 295 - 429 thou/mm3    MPV 9.2 (L) 9.4 - 12.4 fL    Seg Neutrophils 75.4 %    Lymphocytes 13.6 %    Monocytes 9.0 %    Eosinophils 0.6 %    Basophils 0.6 %    Immature Granulocytes 0.8 %    Atypical Lymphocytes OCC. %    Platelet Estimate ADEQUATE Adequate    Segs Absolute 8.1 (H) 1 - 7 thou/mm3    Lymphocytes Absolute 1.5 1.0 - 4.8 thou/mm3    Monocytes Absolute 1.0 0.4 - 1.3 thou/mm3    Eosinophils Absolute 0.1 0.0 - 0.4 thou/mm3    Basophils Absolute 0.1 0.0 - 0.1 thou/mm3    Immature Grans (Abs) 0.09 (H) 0.00 - 0.07 thou/mm3    nRBC 0 /100 wbc    Anisocytosis Present Absent    BASOPHILIA 1+ Absent    Hypochromia PRESENT Absent    Poikilocytes 1+ Absent    Smudge Cells Present Absent   Comprehensive Metabolic Panel w/ Reflex to MG   Result Value Ref Range    Glucose 138 (H) 70 - 108 mg/dL    CREATININE 1.5 (H) 0.4 - 1.2 mg/dL    BUN 31 (H) 7 - 22 mg/dL    Sodium 136 135 - 145 meq/L    Potassium reflex Magnesium 5.3 (H) 3.5 - 5.2 meq/L    Chloride 101 98 - 111 meq/L    CO2 25 23 - 33 meq/L    Calcium 9.0 8.5 - 10.5 mg/dL    AST 25 5 - 40 U/L    Alkaline Phosphatase 106 38 - 126 U/L    Total Protein 6.9 6.1 - 8.0 g/dL    Albumin 3.0 (L) 3.5 - 5.1 g/dL    Total Bilirubin 0.3 0.3 - 1.2 mg/dL    ALT 11 11 - 66 U/L   Troponin   Result Value Ref Range    Troponin T 0.059 (A) ng/ml   Brain Natriuretic Peptide   Result Value Ref Range    Pro-.1 (H) 0.0 - 900.0 pg/mL   Protime-INR   Result Value Ref Range    INR 1.13 0.85 - 1.13   Anion Gap Result Value Ref Range    Anion Gap 10.0 8.0 - 16.0 meq/L   Glomerular Filtration Rate, Estimated   Result Value Ref Range    Est, Glom Filt Rate 34 (A) ml/min/1.73m2   Osmolality   Result Value Ref Range    Osmolality Calc 280.7 275.0 - 300.0 mOsmol/kg   Scan of Blood Smear   Result Value Ref Range    SCAN OF BLOOD SMEAR see below    Body fluid cell count with differential   Result Value Ref Range    Specimen PLEURAL     Color YELLOW     Character, Body Fluid CLEAR     Total Volume Received Body Fluid 73.0 ml    Total Nucleated cells Body Fluid 78 0 - 500 /cumm    Body Fluid RBC < 2000 /cumm   Lactate dehydrogenase, body fluid   Result Value Ref Range    LD, Fluid 114 U/L   Glucose, body fluid   Result Value Ref Range    Glucose, Fluid 143 mg/dl   pH, body fluid   Result Value Ref Range    pH, Fluid 7.43    Protein, body fluid   Result Value Ref Range    Protein, Fluid 4.3 gm/dl   Lactate Dehydrogenase   Result Value Ref Range     100 - 190 U/L   Microscopic Urinalysis   Result Value Ref Range    Glucose, Urine NEGATIVE NEGATIVE mg/dl    Bilirubin Urine NEGATIVE NEGATIVE    Ketones, Urine NEGATIVE NEGATIVE    Specific Gravity, UA 1.016 1.002 - 1.030    Blood, Urine LARGE (A) NEGATIVE    pH, UA 5.5 5.0 - 9.0    Protein,  (A) NEGATIVE mg/dl    Urobilinogen, Urine 0.2 0.0 - 1.0 eu/dl    Nitrite, Urine NEGATIVE NEGATIVE    Leukocytes, UA LARGE (A) NEGATIVE    Color, UA YELLOW YELLOW-STRAW    Character, Urine TURBID (A) [id number]    RBC, UA OBSCURED 0-2/hpf /hpf    WBC, UA OBSCURED 0-4/hpf /hpf    Epithelial Cells, UA OBSCURED 3-5/hpf /hpf    Bacteria, UA OBSCURED FEW/NONE SEEN    Casts OBSCURED NONE SEEN /lpf    Crystals OBSCURED NONE SEEN    Renal Epithelial, UA OBSCURED NONE SEEN    Yeast, UA OBSCURED NONE SEEN    Casts OBSCURED /lpf    Miscellaneous Lab Test Result OBSCURED    Blood Gas, Arterial   Result Value Ref Range    pH, Blood Gas 7.39 7.35 - 7.45    PCO2 42 35 - 45 mmhg    PO2 82 71 - 104 mmhg    HCO3 26 23 - 28 mmol/l    Base Excess (Calculated) 0.4 -2.5 - 2.5 mmol/l    O2 Sat 96 %    IFIO2 40     DEVICE Adult Vent     Mode PAV     SET RESPIRATORY RATE 16 bpm    SET PEEP 8.0 mmhg    SET PRESS SUPP 18 cmH2O    PIP 26 cmH2O    David Test N/A     Source: WILLIAM Mcclellan     COLLECTED BY: [id number]    Procalcitonin   Result Value Ref Range    Procalcitonin 0.24 (H) 0.01 - 0.09 ng/mL   Lactate dehydrogenase   Result Value Ref Range     100 - 190 U/L   MRSA by PCR   Result Value Ref Range    MRSA SCREEN RT-PCR NEGATIVE    Magnesium   Result Value Ref Range    Magnesium 1.9 1.6 - 2.4 mg/dL   Magnesium   Result Value Ref Range    Magnesium 2.0 1.6 - 2.4 mg/dL   Brain Natriuretic Peptide   Result Value Ref Range    Pro-.0 0.0 - 900.0 pg/mL   Blood Gas, Arterial   Result Value Ref Range    pH, Blood Gas 7.39 7.35 - 7.45    PCO2 42 35 - 45 mmhg    PO2 87 71 - 104 mmhg    HCO3 25 23 - 28 mmol/l    Base Excess (Calculated) 0.1 -2.5 - 2.5 mmol/l    O2 Sat 97 %    IFIO2 30     DEVICE Adult Vent     Mode PC     SET RESPIRATORY RATE 16 bpm    SET PEEP 6.0 mmhg    SET PRESS SUPP 16 cmH2O    David Test N/A     Source: IVELISSE Mcclellan     COLLECTED BY: [id number]    EKG 12 Lead   Result Value Ref Range    Ventricular Rate 104 BPM    Atrial Rate 104 BPM    P-R Interval 208 ms    QRS Duration 70 ms    Q-T Interval 492 ms    QTc Calculation (Bazett) 646 ms    R Axis 123 degrees    T Axis -44 degrees   EKG 12 Lead   Result Value Ref Range    Ventricular Rate 67 BPM    Atrial Rate 67 BPM    P-R Interval 184 ms    QRS Duration 86 ms    Q-T Interval 438 ms    QTc Calculation (Bazett) 462 ms    P Axis 34 degrees    R Axis 89 degrees    T Axis 15 degrees     Narrative & Impression  Sinus tachycardia with occasional Premature ventricular complexes  Possible Right ventricular hypertrophy  Nonspecific ST and T wave abnormality  Abnormal ECG  When compared with ECG of 06-MAR-2020 12:32  Previous ECG has undetermined rhythm, needs review  Nonspecific T wave abnormality has replaced inverted T waves in Inferior leads  T wave inversion less evident in Anterior leads  Confirmed by Kole Wright ([id number]) on 10/27/2021 2:57:02 PM     Radiology:     XR CHEST PORTABLE  Result Date: 10/27/2021  PROCEDURE: XR CHEST PORTABLE CLINICAL INFORMATION: Postthoracentesis. COMPARISON: Chest x-ray 10/27/2021. TECHNIQUE: AP portable chest radiograph performed. FINDINGS: Lines/tubes: The endotracheal tube terminates approximately 18 mm above the level of the avelina. The nasogastric catheter extends below the hemidiaphragms is distal tip in the projection of the left upper quadrant. Heart/mediastinum: Cardiomegaly is stable. The pulmonary vascularity is unremarkable. Lungs: The left pleural effusion has been drained. No pneumothorax is identified. Improved aeration is noted in the left hemithorax. A trace right pleural effusion is suspected. Bones: Diffuse osteopenia is observed. The visualized skeletal structures appear intact. Status post drainage of the left pleural effusion with improved aeration noted in the left hemithorax. No pneumothorax observed. Cardiomegaly is stable. Life support and monitoring devices are unchanged. **This report has been created using voice recognition software. It may contain minor errors which are inherent in voice recognition technology. ** Final report electronically signed by Dr Romain Saravia on 10/27/2021 4:18 PM    XR CHEST PORTABLE  Result Date: 10/27/2021  PROCEDURE: XR CHEST PORTABLE CLINICAL INFORMATION: Endotracheal tube adjustment. COMPARISON: Chest x-ray 10/27/2021. TECHNIQUE: AP portable chest radiograph performed. FINDINGS: Lines/tubes: The endotracheal tube tip now terminates approximately 18 mm above the level of the avelina. The nasogastric catheter extends below the hemidiaphragms with tip in the projection of the left upper quadrant, incompletely visualized. Heart/mediastinum: Cardiomegaly is unchanged.  Lungs: Improved aeration is noted in the left upper lobe. There continues to be left perihilar and left lower lobe consolidation. Improved aeration is noted in the right lower lobe. No pneumothorax is observed. Bones: The visualized skeletal structures appear intact. The endotracheal tube has been adjusted with tip now terminating approximately 18 mm above the level of the avelina. Improved aeration is noted in the left upper lobe and right lower lobe. Persistent dense consolidation in the left mid and lower lobe are observed. **This report has been created using voice recognition software. It may contain minor errors which are inherent in voice recognition technology. ** Final report electronically signed by Dr Brooke Matt on 10/27/2021 3:41 PM    XR CHEST PORTABLE  Result Date: 10/27/2021  PROCEDURE: XR CHEST PORTABLE CLINICAL INFORMATION: Intubation. COMPARISON: Chest x-ray 10/27/2021. TECHNIQUE: AP portable chest radiograph performed. FINDINGS: Lines/tubes: The endotracheal tube tip terminates within the proximal right mainstem bronchus. A nasogastric catheter extends below the hemidiaphragm in the projection of the left upper quadrant. Heart/mediastinum: Cardiomegaly is stable. Lungs: Improved aeration is noted in the left hemithorax. Dense consolidation is seen in the left upper lobe and left lower lobe. Improved aeration is noted in the right lung base. No pneumothorax is observed. Bones: Diffuse osteopenia is present. The visualized skeletal structures appear intact. The endotracheal tube tip terminates in the right mainstem bronchus. Improved aeration is noted in the right lower lobe and left hemithorax. There continues to be dense consolidation in the left upper lobe and left lower lobe. Cardiomegaly is stable. COMMUNICATION: The results of this examination were discussed with Dr. Zachary Ferrell at 3:15 PM on 10/27/2021. **This report has been created using voice recognition software.   It may contain minor errors which are inherent in voice recognition technology. ** Final report electronically signed by Dr Joel Griffith on 10/27/2021 3:16 PM    XR CHEST PORTABLE  Result Date: 10/27/2021  PROCEDURE: XR CHEST PORTABLE CLINICAL INFORMATION: Shortness of breath. COMPARISON: Chest x-ray 7/15/2020. TECHNIQUE: AP portable chest radiograph performed. FINDINGS: Lines/tubes: Monitoring devices overlie the chest. Heart/mediastinum: The heart size and mediastinal contours are obscured. Pulmonary vascular congestion is observed. Lungs: Near complete opacification of the left hemithorax with very little aerated lung in the left upper lobe is observed. A small right pleural effusion with right lower lobe consolidation obscures visualization of the right hemidiaphragm. No pneumothorax is identified. Bones: Diffuse osteopenia is present. The visualized skeletal structures appear intact. Near complete opacification of the left hemithorax with very little aerated lung visualized in the left upper lobe. Small right pleural effusion and right lower lobe consolidation obscures visualization of the right hemidiaphragm pulmonary vascular congestion is observed. **This report has been created using voice recognition software. It may contain minor errors which are inherent in voice recognition technology. ** Final report electronically signed by Dr Joel Griffith on 10/27/2021 2:20 PM    US THORACENTESIS Which side should the procedure be performed? Left  Result Date: 10/27/2021  THORACENTESIS WITH ULTRASOUND GUIDANCE: PERFORMED BY: Kiel Flores M.D. CLINICAL INFORMATION: Pleural effusion left side. APPROACH: Left hemithorax, inferolateral, patient in right side down decubitus position. . Multiple permanent sonographic images were obtained during procedure for documentation.  FLUID WITHDRAWN: 1100 mL hickman serous fluid ESTIMATED BLOOD LOSS: Minimal PROCEDURE: Signed informed consent was obtained prior to performing this procedure. The thorax was initially evaluated sonographically to determine appropriate puncture site. The skin was marked, prepped, and draped in a sterile fashion. Following local anesthesia and utilizing aseptic technique, a 5 Gu[place]ian one-step catheter was successfully inserted into the pleural effusion at the position indicated above. Pleural fluid in the amount was above was then aspirated and the needle was removed. The patient tolerated the procedure well. A post procedure chest radiograph will be obtained. Status post thoracentesis **This report has been created using voice recognition software. It may contain minor errors which are inherent in voice recognition technology. ** Final report electronically signed by Dr. Avi House on 10/27/2021 4:33 PM    CODE STATUS: FULL    Critical Care Time: 50 minutes    Thank you Maxwell Lake MD for the opportunity to be involved in this patient's care.     Electronically signed by Kirsten Bernheim, MD on 10/28/2021 at 4:36 AM

## 2021-10-27 NOTE — ED NOTES
Pt resting in bed. Dr. Garcia Must at bedside to speak with family. Pt medicated per order.       Omid Landers RN  10/27/21 [id number]

## 2021-10-27 NOTE — TELEPHONE ENCOUNTER
Patients sister called and stated that the patient is having trouble breathing and needs to cancel her appointment today (10/27/2021). They will call to reschedule after ED visit.

## 2021-10-27 NOTE — ED PROVIDER NOTES
[facility] ENCOUNTER          Pt Name: Cozette Dance  MRN: [id number]  [place] 1950  Date of evaluation: 10/27/2021  Treating Resident Physician: Prudencio Martinez MD  Supervising Physician: Dr. El Cadena       Chief Complaint   Patient presents with    Shortness of Breath     History obtained from the patient and sister      HISTORY OF PRESENT ILLNESS    HPI  Cozette Dance is a 73 y.o. female who presents to the emergency department for evaluation of shortness of breath and altered mental status    Patient sister is present in the room. Most of history from patient sister. Patient gives simple responses yes and noes. Patient was brought in the emergency department after her sister noticed that she was having worsening shortness of breath throughout the day and seemed a little bit slower to respond. Sister checked the home pulse ox and noted to be hypoxic to 51% so brought her into the emergency department. Patient was recently discontinued from taking Bumex with today being the first day not taking it. Patient does have past medical history significant for CHF and pulmonary hypertension. She denies any tobacco alcohol or recreational drug use. Denies any recent sick contacts. Patient is not on any narcotic pain medication. Patient has no history of cancer or recent surgeries. When asked about other symptoms patient only endorses a mild headache unable to characterize. Patient denies any weakness, numbness, involuntary loss of bladder or bowel function, chest pain, change in vision. Patient currently denies having any coughs, runny nose, postnasal drip    The patient has no other acute complaints at this time. REVIEW OF SYSTEMS   Review of Systems   Constitutional: Negative for chills, fatigue, fever and unexpected weight change. HENT: Negative for ear pain and hearing loss.     Eyes: Negative for photophobia, pain and visual disturbance. Respiratory: Positive for shortness of breath. Negative for chest tightness. Cardiovascular: Negative for chest pain and leg swelling. Gastrointestinal: Negative for blood in stool, constipation, diarrhea, nausea and vomiting. Endocrine: Negative for cold intolerance and heat intolerance. Genitourinary: Negative for difficulty urinating, dysuria, hematuria and vaginal bleeding. Musculoskeletal: Negative for arthralgias and back pain. Neurological: Positive for headaches. Negative for dizziness, light-headedness and numbness. Psychiatric/Behavioral: Negative for agitation, confusion and sleep disturbance.          PAST MEDICAL AND SURGICAL HISTORY     Past Medical History:   Diagnosis Date    CHF (congestive heart failure) ([facility] [address]     Dr. Vivien Mcclelland Depression     Gout attack     Hypertension     Osteoarthritis     Pulmonary artery hypertension ([facility] [address]     [facility]-- Dr. Erlin Subramanian-- Dr. Vivien Mcclelland Renal calculi     Dr. Jorge Infarnoldo Thrombocytopenia [facility])      Past Surgical History:   Procedure Laterality Date    APPENDECTOMY  1960   [address] [place] [address]    PRESSURE ULCER DEBRIDEMENT Right 8/6/2021    EXCISION RIGHT BUTTOCK WOUND AND CLOSURE performed by Iman Amezquita MD at [address]     Current Facility-Administered Medications:     nitroGLYCERIN 50 mg in dextrose 5% 250 mL infusion, 5-200 mcg/min, IntraVENous, Continuous, Ivon Sanders MD, Stopped at 10/27/21 1439    fentaNYL 500 mcg in sodium chloride 0.9% 100 ml infusion, 12.5-200 mcg/hr, IntraVENous, Continuous, Tommye Jetandrzej, DO, Last Rate: 15 mL/hr at 10/27/21 1531, 75 mcg/hr at 10/27/21 1531    sodium chloride flush 0.9 % injection 5-40 mL, 5-40 mL, IntraVENous, 2 times per day, Tommye Jetty, DO    sodium chloride flush 0.9 % injection 5-40 mL, 5-40 mL, IntraVENous, PRN, Tommye Jetty, DO    0.9 % sodium chloride infusion, 25 mL, IntraVENous, PRN, Tommye Jetty, DO    fentaNYL (SUBLIMAZE) injection 50 mcg, 50 mcg, IntraVENous, Once, Devin Gordillo DO    midazolam (VERSED) injection 2 mg, 2 mg, IntraVENous, Q1H PRN, Grace Vyas MD    dexmedetomidine (PRECEDEX) 400 mcg in sodium chloride 0.9 % 100 mL infusion, 0.2-1.4 mcg/kg/hr, IntraVENous, Continuous, Grace Vyas MD, Last Rate: 3.7 mL/hr at 10/27/21 1558, 0.2 mcg/kg/hr at 10/27/21 1558    Current Outpatient Medications:     metoprolol tartrate (LOPRESSOR) 25 MG tablet, Take 0.5 tablets by mouth 2 times daily, Disp: 90 tablet, Rfl: 1    sodium hypochlorite (DAKINS) 0.125 % SOLN external solution, Apply Dakin's moistened gauze dressings to wound twice daily and as needed. , Disp: 1 Bottle, Rfl: 2    sodium chloride 1 g tablet, Take 1 tablet by mouth 2 times daily, Disp: 240 tablet, Rfl: 3    allopurinol (ZYLOPRIM) 300 MG tablet, Take 1 tablet by mouth daily, Disp: 90 tablet, Rfl: 3    FLUoxetine (PROZAC) 40 MG capsule, Take 1 capsule by mouth daily, Disp: 90 capsule, Rfl: 3    potassium chloride (KLOR-CON M) 10 MEQ extended release tablet, Take 1 tablet by mouth every other day, Disp: 90 tablet, Rfl: 3    bumetanide (BUMEX) 1 MG tablet, Take 1 tablet by mouth daily, Disp: 60 tablet, Rfl: 11    Multiple Vitamins-Minerals (MULTIVITAMIN WOMEN PO), Take 1 tablet by mouth daily, Disp: , Rfl:     acetaminophen (TYLENOL) 650 MG extended release tablet, Take 650 mg by mouth every 8 hours as needed for Pain, Disp: , Rfl:     Riociguat (ADEMPAS) 2.5 MG TABS, Take 2.5 mg by mouth 3 times daily, Disp: , Rfl:     docusate sodium (COLACE) 100 MG capsule, Take 100 mg by mouth as needed , Disp: , Rfl:     aspirin 81 MG tablet, Take 81 mg by mouth daily , Disp: , Rfl:       SOCIAL HISTORY     Social History     Social History Narrative    Not on file     Social History     Tobacco Use    Smoking status: Never Smoker    Smokeless tobacco: Never Used   Vaping Use    Vaping Use: Never used   Substance Use Topics    Alcohol use: No    Drug use: No         ALLERGIES   No Known Allergies      FAMILY HISTORY     Family History   Problem Relation Age of Onset    Diabetes Father    Sue Solano Arthritis Mother     COPD Mother     Diabetes Sister     Heart Disease Maternal Uncle     Breast Cancer Niece 36    Sleep Apnea Brother     Asthma Neg Hx     Birth Defects Neg Hx     Cancer Neg Hx     Depression Neg Hx     Early Death Neg Hx     Hearing Loss Neg Hx     High Blood Pressure Neg Hx     High Cholesterol Neg Hx     Kidney Disease Neg Hx     Learning Disabilities Neg Hx     Mental Illness Neg Hx     Mental Retardation Neg Hx     Miscarriages / Stillbirths Neg Hx     Stroke Neg Hx     Substance Abuse Neg Hx     Vision Loss Neg Hx     Other Neg Hx          PREVIOUS RECORDS   Previous records reviewed: Patient was last seen in the emergency department on March 6, 2020 for right foot pain. PHYSICAL EXAM     ED Triage Vitals   BP Temp Temp Source Pulse Resp SpO2 Height Weight   10/27/21 1349 10/27/21 1346 10/27/21 1346 10/27/21 1346 10/27/21 1346 10/27/21 1346 -- 10/27/21 1346   129/65 97.5 °F (36.4 °C) Axillary 91 30 100 %  165 lb (74.8 kg)     Initial vital signs and nursing assessment reviewed and normal. Body mass index is 35.71 kg/m². Pulsoximetry is abnormal per my interpretation. Additional Vital Signs:  Vitals:    10/27/21 1703   BP: 90/73   Pulse: 83   Resp: 16   Temp:    SpO2: 100%       Physical Exam  Constitutional:       General: She is not in acute distress. Appearance: She is obese. She is not ill-appearing. HENT:      Head: Normocephalic and atraumatic. Mouth/Throat:      Mouth: Mucous membranes are moist.   Eyes:      Pupils: Pupils are equal, round, and reactive to light. Neck:      Thyroid: No thyromegaly. Trachea: No tracheal deviation. Cardiovascular:      Rate and Rhythm: Normal rate and regular rhythm.       Heart sounds: Normal heart sounds, S1 normal and S2 normal.   Pulmonary:      Effort: Tachypnea and accessory muscle usage present. Breath sounds: Examination of the left-upper field reveals decreased breath sounds. Examination of the left-middle field reveals decreased breath sounds. Examination of the left-lower field reveals decreased breath sounds. Decreased breath sounds and wheezing present. Comments: And expiratory wheezing present  Abdominal:      General: Bowel sounds are normal.      Palpations: Abdomen is soft. Tenderness: There is no abdominal tenderness. Musculoskeletal:      Right lower leg: No edema. Left lower leg: No edema. Lymphadenopathy:      Cervical: No cervical adenopathy. Skin:     General: Skin is dry. Capillary Refill: Capillary refill takes 2 to 3 seconds. Neurological:      Mental Status: She is oriented to person, place, and time. She is lethargic. GCS: GCS eye subscore is 3. GCS verbal subscore is 5. GCS motor subscore is 6. Cranial Nerves: Cranial nerves are intact. Sensory: Sensation is intact. Motor: Motor function is intact. MEDICAL DECISION MAKING   Initial Assessment:   1. Patient is a 82-year-old female presenting to the emergency department and acute hypoxic respiratory failure with hypercarbia secondary to pulmonary edema on the left side. Patient has a past medical history significant for pulmonary hypertension and congestive heart failure. Exam markable for significant and expiratory wheezing with decreased breath sounds on the left. No rales appreciated. No pitting edema or JVD.   Plan:    CBC, BMP, BNP, troponin, lactate, coags, ABG, EKG, chest x-ray, UA        ED RESULTS   Laboratory results:  Labs Reviewed   BLOOD GAS, ARTERIAL - Abnormal; Notable for the following components:       Result Value    pH, Blood Gas 7.19 (*)     PCO2 80 (*)     PO2 134 (*)     HCO3 31 (*)     All other components within normal limits   CBC WITH AUTO DIFFERENTIAL - Abnormal; Notable for the following components:    RBC 3.91 (*)     Hemoglobin 9.6 (*)     Hematocrit 33.6 (*)     MCH 24.6 (*)     MCHC 28.6 (*)     RDW-CV 16.9 (*)     RDW-SD 52.2 (*)     MPV 9.2 (*)     Segs Absolute 8.1 (*)     Immature Grans (Abs) 0.09 (*)     All other components within normal limits   COMPREHENSIVE METABOLIC PANEL W/ REFLEX TO MG FOR LOW K - Abnormal; Notable for the following components:    Glucose 138 (*)     CREATININE 1.5 (*)     BUN 31 (*)     Potassium reflex Magnesium 5.3 (*)     Albumin 3.0 (*)     All other components within normal limits   TROPONIN - Abnormal; Notable for the following components:    Troponin T 0.059 (*)     All other components within normal limits   BRAIN NATRIURETIC PEPTIDE - Abnormal; Notable for the following components:    Pro-.1 (*)     All other components within normal limits   GLOMERULAR FILTRATION RATE, ESTIMATED - Abnormal; Notable for the following components:    Est, Glom Filt Rate 34 (*)     All other components within normal limits   MICROSCOPIC URINALYSIS - Abnormal; Notable for the following components:    Blood, Urine LARGE (*)     Protein,  (*)     Leukocytes, UA LARGE (*)     Character, Urine TURBID (*)     All other components within normal limits   COVID-19, RAPID   CULTURE, BLOOD 1   CULTURE, BLOOD 2   CULTURE, BODY FLUID   CULTURE WITH SMEAR, ACID FAST BACILLIUS   LACTATE, SEPSIS   PROTIME-INR   ANION GAP   OSMOLALITY   SCAN OF BLOOD SMEAR   BODY FLUID CELL COUNT WITH DIFFERENTIAL   LACTATE DEHYDROGENASE, BODY FLUID   GLUCOSE, BODY FLUID   PH, BODY FLUID   PROTEIN, BODY FLUID   CYTOLOGY, NON-GYN   LACTATE DEHYDROGENASE       Radiologic studies results:  XR CHEST PORTABLE   Final Result   Status post drainage of the left pleural effusion with improved aeration noted in the left hemithorax. No pneumothorax observed. Cardiomegaly is stable. Life support and monitoring devices are unchanged. **This report has been created using voice recognition software.   It may contain minor errors which are inherent in voice recognition technology. **      Final report electronically signed by Dr Silvia Deluca on 10/27/2021 4:18 PM      US THORACENTESIS Which side should the procedure be performed? Left   Final Result   Status post thoracentesis            **This report has been created using voice recognition software. It may contain minor errors which are inherent in voice recognition technology. **      Final report electronically signed by Dr. Anh Blackwood on 10/27/2021 4:33 PM      XR CHEST PORTABLE   Final Result      The endotracheal tube has been adjusted with tip now terminating approximately 18 mm above the level of the avelina. Improved aeration is noted in the left upper lobe and right lower lobe. Persistent dense consolidation in the left mid and lower lobe are    observed. **This report has been created using voice recognition software. It may contain minor errors which are inherent in voice recognition technology. **      Final report electronically signed by Dr Silvia Deluca on 10/27/2021 3:41 PM      XR CHEST PORTABLE   Final Result      The endotracheal tube tip terminates in the right mainstem bronchus. Improved aeration is noted in the right lower lobe and left hemithorax. There continues to be dense consolidation in the left upper lobe and left lower lobe. Cardiomegaly is stable. COMMUNICATION: The results of this examination were discussed with Dr. Ac Zurita at 3:15 PM on 10/27/2021. **This report has been created using voice recognition software. It may contain minor errors which are inherent in voice recognition technology. **      Final report electronically signed by Dr Silvia Deluca on 10/27/2021 3:16 PM      XR CHEST PORTABLE   Final Result      Near complete opacification of the left hemithorax with very little aerated lung visualized in the left upper lobe.  Small right pleural effusion and right lower lobe consolidation obscures visualization of the right hemidiaphragm pulmonary vascular    congestion is observed. **This report has been created using voice recognition software. It may contain minor errors which are inherent in voice recognition technology. **      Final report electronically signed by Dr Nguyen on 10/27/2021 2:20 PM      XR CHEST PORTABLE    (Results Pending)   XR CHEST PORTABLE    (Results Pending)       ED Medications administered this visit:   Medications   nitroGLYCERIN 50 mg in dextrose 5% 250 mL infusion (0 mcg/min IntraVENous Stopped 10/27/21 1439)   fentaNYL 500 mcg in sodium chloride 0.9% 100 ml infusion (75 mcg/hr IntraVENous Rate/Dose Change 10/27/21 1531)   sodium chloride flush 0.9 % injection 5-40 mL (has no administration in time range)   sodium chloride flush 0.9 % injection 5-40 mL (has no administration in time range)   0.9 % sodium chloride infusion (has no administration in time range)   fentaNYL (SUBLIMAZE) injection 50 mcg (50 mcg IntraVENous Bolus from Bag 10/27/21 1534)   midazolam (VERSED) injection 2 mg (has no administration in time range)   dexmedetomidine (PRECEDEX) 400 mcg in sodium chloride 0.9 % 100 mL infusion (0.2 mcg/kg/hr × 74.8 kg IntraVENous New Bag 10/27/21 1558)   etomidate (AMIDATE) injection (20 mg IntraVENous Given 10/27/21 1435)   succinylcholine (ANECTINE) injection (100 mg IntraVENous Given 10/27/21 1435)   fentaNYL (SUBLIMAZE) injection 50 mcg (50 mcg IntraVENous Given 10/27/21 1451)   propofol injection 10 mg (10 mg IntraVENous Given 10/27/21 1453)   midazolam (VERSED) injection 2 mg (2 mg IntraVENous Given 10/27/21 1540)         ED COURSE     ED Course as of Oct 27 1722   Wed Oct 27, 2021   1402 Arterial blood gas shows a pH of 7.19 and a CO2 of 80 patient be started on BiPAP    [KARIS]   1421 Blood gas, arterial(!!):    pH, Blood Gas 7.19(!!)   PCO2 80(!!)   pO2 134(!)   HCO3 31(!)   Base Excess (Calculated) 0.7   O2 Sat 98   IFIO2 100   DEVICE NRB   David Test N/A   Source: R Brach   COLLECTED BY: [id number] [KARIS]   4381 Brain Natriuretic Peptide(!):    Pro-.1(!) [KARIS]   1454 Blood gas, arterial(!!):    pH, Blood Gas 7.19(!!)   PCO2 80(!!)   pO2 134(!)   HCO3 31(!)   Base Excess (Calculated) 0.7   O2 Sat 98   IFIO2 100   DEVICE NRB   David Test N/A   Source: R Brach   COLLECTED BY: [id number] [KARIS]   1454 Troponin(!):    Troponin T 0.059(!) [KARIS]   1454 CBC Auto Differential(!):    WBC 10.7   RBC 3.91(!)   Hemoglobin Quant 9.6(!)   Hematocrit 33.6(!)   MCV 85.9   MCH 24.6(!)   MCHC 28.6(!)   RDW-CV 16.9(!)   RDW-SD 52.2(!)   Platelet Count 839   MPV 9.2(!) [KARIS]   1454 Comprehensive Metabolic Panel w/ Reflex to MG(!):    Glucose 138(!)   Creatinine 1.5(!)   BUN 31(!)   Sodium 136   Potassium 5.3(!)   Chloride 101   CO2 25   Calcium 9.0   AST 25   Alk Phos 106   Total Protein 6.9   Albumin 3.0(!)   Bilirubin 0.3   ALT 11 [KARIS]   1454 Glomerular Filtration Rate, Estimated(!):    Est, Glom Filt Rate 34(!) [KARIS]   1455 Worsening respiratory status patient require intubation. [KARIS]   1507 Chest x-ray demonstrates significant left-sided pleural effusion causing complete whiteout. Eventual radiology contacted for thoracentesis which will likely need to be sent out for analysis. [KARIS]   1536 His blood pressure not tolerating the propofol. Precedex in order with as needed Versed to bridge the gap. [KARIS]      ED Course User Index  [KARIS] Saul Douglas MD     Intubation    Date/Time: 10/27/2021 2:55 PM  Performed by: Saul Douglas MD  Authorized by: Comfort Llanes DO     Consent:     Consent obtained:  Verbal    Consent given by:  Patient    Risks discussed:  Death, hypoxia, dental trauma and aspiration    Alternatives discussed:  No treatment and delayed treatment  Pre-procedure details:     Patient status:  Altered mental status    Mallampati score:  III    Pretreatment meds: Etomidate.     Paralytics:  Succinylcholine  Procedure details:     Preoxygenation:  BiPAP    CPR in progress: no      Intubation method:  Oral    Oral intubation technique:  Video-assisted    Laryngoscope size: Glide Scope. Tube size (mm):  7.0    Tube type:  Cuffed    Number of attempts:  1    Cricoid pressure: no      Tube visualized through cords: yes    Placement assessment:     ETT to teeth:  24    Tube secured with:  ETT patrick    Breath sounds:  Reduced on left    Placement verification: chest rise, CXR verification and ETCO2 detector      CXR findings:  ETT in right main stem    Tube repositioned: yes (23 at teeth)    Post-procedure details:     Patient tolerance of procedure: Tolerated well, no immediate complications    Central Line    Date/Time: 10/27/2021 4:54 PM  Performed by: Jeancarlos Frias MD  Authorized by: Haydee Jackson DO     Consent:     Consent obtained:  Verbal    Consent given by:  Healthcare agent    Risks discussed:  Incorrect placement, arterial puncture, infection and bleeding    Alternatives discussed:  No treatment  Pre-procedure details:     Hand hygiene: Hand hygiene performed prior to insertion      Sterile barrier technique: All elements of maximal sterile technique followed      Skin preparation:  ChloraPrep    Skin preparation agent: Skin preparation agent completely dried prior to procedure    Sedation:     Sedation type:  Deep  Anesthesia (see MAR for exact dosages): Anesthesia method:  None  Procedure details:     Location:  R internal jugular    Patient position:  Flat    Procedural supplies:  Triple lumen    Catheter size:  7 Fr    Landmarks identified: yes      Ultrasound guidance: yes      Sterile ultrasound techniques: Sterile gel and sterile probe covers were used      Number of attempts:  2    Successful placement: yes    Post-procedure details:     Post-procedure:  Dressing applied and line sutured    Assessment:  Blood return through all ports, free fluid flow and placement verified by x-ray    Patient tolerance of procedure:   Tolerated well, no immediate complications            MEDICATION CHANGES     New Prescriptions    No medications on file         FINAL DISPOSITION     Final diagnoses:   Acute respiratory failure with hypercapnia (HCC)   Flash pulmonary edema (HCC)     Condition: condition: stable  Dispo: Admit to CCU/ICU      This transcription was electronically signed. Parts of this transcriptions may have been dictated by use of voice recognition software and electronically transcribed, and parts may have been transcribed with the assistance of an ED scribe. The transcription may contain errors not detected in proofreading. Please refer to my supervising physician's documentation if my documentation differs.     Electronically Signed: Rebecca Chaney MD, 10/27/21, 5:22 PM         Steve Argueta MD  Resident  10/27/21 2957

## 2021-10-27 NOTE — PROGRESS NOTES
Pre-Procedure Diagnosis:  pleural effusion    Procedure Performed:  Thoracentesis    Anesthesia: local     Findings: 1100 ml yellow serous fluid removed    Immediate Complications:  None    Estimated Blood Loss: minimal    SEE DICTATED PROCEDURE NOTE FOR COMPLETE DETAILS.     Dakota Jackson MD   10/27/2021 4:32 PM

## 2021-10-27 NOTE — ED NOTES
Dr. Sherryle Drew at bedside at this time for evaluation of central line placement.       Aniyah Espinosa RN  10/27/21 2503

## 2021-10-28 ENCOUNTER — APPOINTMENT (OUTPATIENT)
Dept: GENERAL RADIOLOGY | Age: 71
DRG: 004 | End: 2021-10-28
Payer: MEDICARE

## 2021-10-28 LAB
ACINETOBACTER CALCOACETICUS-BAUMANNII BY PCR: NOT DETECTED
ADENOVIRUS BY PCR: DETECTED
ALLEN TEST: NORMAL
ANA PATTERN: ABNORMAL
ANA TITER: ABNORMAL
ANION GAP SERPL CALCULATED.3IONS-SCNC: 8 MEQ/L (ref 8–16)
ANTINUCLEAR ANTIBODY, HEP-2, IGG: DETECTED
BASE EXCESS (CALCULATED): 0.1 MMOL/L (ref -2.5–2.5)
BODY FLUID RBC: < 2000 /CUMM
BUN BLDV-MCNC: 31 MG/DL (ref 7–22)
CALCIUM SERPL-MCNC: 8.5 MG/DL (ref 8.5–10.5)
CHARACTER, BODY FLUID: CLEAR
CHLAMYDIA PNEUMONIAE BY PCR: NOT DETECTED
CHLORIDE BLD-SCNC: 108 MEQ/L (ref 98–111)
CO2: 26 MEQ/L (ref 23–33)
COLLECTED BY:: NORMAL
COLOR: YELLOW
CREAT SERPL-MCNC: 1.5 MG/DL (ref 0.4–1.2)
DEVICE: NORMAL
EKG ATRIAL RATE: 67 BPM
EKG P AXIS: 34 DEGREES
EKG P-R INTERVAL: 184 MS
EKG Q-T INTERVAL: 268 MS
EKG Q-T INTERVAL: 438 MS
EKG QRS DURATION: 82 MS
EKG QRS DURATION: 86 MS
EKG QTC CALCULATION (BAZETT): 392 MS
EKG QTC CALCULATION (BAZETT): 462 MS
EKG R AXIS: 101 DEGREES
EKG R AXIS: 89 DEGREES
EKG T AXIS: -52 DEGREES
EKG T AXIS: 15 DEGREES
EKG VENTRICULAR RATE: 129 BPM
EKG VENTRICULAR RATE: 67 BPM
ENTEROBACTER CLOACAE COMPLEX BY PCR: NOT DETECTED
ERYTHROCYTE [DISTWIDTH] IN BLOOD BY AUTOMATED COUNT: 16.6 % (ref 11.5–14.5)
ERYTHROCYTE [DISTWIDTH] IN BLOOD BY AUTOMATED COUNT: 52.9 FL (ref 35–45)
ESCHERICHIA COLI BY PCR: NOT DETECTED
FERRITIN: 135 NG/ML (ref 10–291)
GFR SERPL CREATININE-BSD FRML MDRD: 34 ML/MIN/1.73M2
GLOMERULAR BASEMENT MEMBRANE ANTIBODY: NORMAL
GLUCOSE BLD-MCNC: 113 MG/DL (ref 70–108)
HAEMOPHILUS INFLUENZAE BY PCR: NOT DETECTED
HCO3: 25 MMOL/L (ref 23–28)
HCT VFR BLD CALC: 30.3 % (ref 37–47)
HEMOGLOBIN: 8.5 GM/DL (ref 12–16)
HEPARIN UNFRACTIONATED: 0.19 U/ML (ref 0.3–0.7)
HEPARIN UNFRACTIONATED: 0.21 U/ML (ref 0.3–0.7)
IFIO2: 30
IMMUNOFIXATION RESULT, SERUM: NORMAL
INFLUENZA A BY PCR: NOT DETECTED
INFLUENZA B BY PCR: NOT DETECTED
INR BLD: 1.24 (ref 0.85–1.13)
KLEBSIELLA AEROGENES BY PCR: NOT DETECTED
KLEBSIELLA OXYTOCA BY PCR: NOT DETECTED
KLEBSIELLA PNEUMONIAE GROUP BY PCR: NOT DETECTED
LD: 134 U/L (ref 100–190)
LEGIONELLA PNEUMOPHILIA BY PCR: NOT DETECTED
LV EF: 63 %
LVEF MODALITY: NORMAL
MAGNESIUM: 1.9 MG/DL (ref 1.6–2.4)
MAGNESIUM: 1.9 MG/DL (ref 1.6–2.4)
MAGNESIUM: 2 MG/DL (ref 1.6–2.4)
MCH RBC QN AUTO: 24.7 PG (ref 26–33)
MCHC RBC AUTO-ENTMCNC: 28.1 GM/DL (ref 32.2–35.5)
MCV RBC AUTO: 88.1 FL (ref 81–99)
MESOTHELIAL CELLS BODY FLUID: NORMAL
METAPNEUMOVIRUS BY PCR: NOT DETECTED
MODE: NORMAL
MONONUCLEAR CELLS BODY FLUID: 63.6 %
MORAXELLA CATARRHALIS BY PCR: NOT DETECTED
MRSA SCREEN RT-PCR: NEGATIVE
MYCOPLASMA PNEUMONIAE BY PCR: NOT DETECTED
NON-SARS CORONAVIRUS: NOT DETECTED
O2 SATURATION: 97 %
PARAINFLUENZA VIRUS BY PCR: NOT DETECTED
PATHOLOGIST REVIEW: NORMAL
PCO2: 42 MMHG (ref 35–45)
PH BLOOD GAS: 7.39 (ref 7.35–7.45)
PHOSPHORUS: 4.1 MG/DL (ref 2.4–4.7)
PLATELET # BLD: 267 THOU/MM3 (ref 130–400)
PMV BLD AUTO: 8.8 FL (ref 9.4–12.4)
PO2: 87 MMHG (ref 71–104)
POLYMORPHONUCLEAR CELLS BODY FLUID: 36.4 %
POTASSIUM SERPL-SCNC: 4.7 MEQ/L (ref 3.5–5.2)
PRO-BNP: 883 PG/ML (ref 0–900)
PROTEUS SPECIES BY PCR: NOT DETECTED
PSEUDOMONAS AERUGINOSA BY PCR: NOT DETECTED
RBC # BLD: 3.44 MILL/MM3 (ref 4.2–5.4)
RESISTANT GENE CTX-M BY PCR: ABNORMAL
RESISTANT GENE IMP BY PCR: ABNORMAL
RESISTANT GENE KPC BY PCR: ABNORMAL
RESISTANT GENE MECA/C & MREJ BY PCR: DETECTED
RESISTANT GENE NDM BY PCR: ABNORMAL
RESISTANT GENE OXA-48-LIKE BY PCR: ABNORMAL
RESISTANT GENE VIM BY PCR: ABNORMAL
RESPIRATORY SYNCYTIAL VIRUS BY PCR: NOT DETECTED
RHINOVIRUS ENTEROVIRUS PCR: NOT DETECTED
SERRATIA MARCESCENS BY PCR: NOT DETECTED
SET PEEP: 6 MMHG
SET PRESS SUPP: 16 CMH2O
SET RESPIRATORY RATE: 16 BPM
SODIUM BLD-SCNC: 142 MEQ/L (ref 135–145)
SOURCE, BLOOD GAS: NORMAL
SOURCE: ABNORMAL
SPECIMEN ACCEPTABILITY: ABNORMAL
SPECIMEN: NORMAL
STAPH AUREUS BY PCR: DETECTED
STREP AGALACTIAE BY PCR: NOT DETECTED
STREP PNEUMONIAE BY PCR: NOT DETECTED
STREP PYOGENES BY PCR: NOT DETECTED
TOTAL NUCLEATED CELLS BODY FLUID: 78 /CUMM (ref 0–500)
TOTAL VOLUME RECEIVED BODY FLUID: 73 ML
TRANSFERRIN: 139 MG/DL (ref 200–360)
TROPONIN T: 0.08 NG/ML
VANCOMYCIN RESISTANT ENTEROCOCCUS: NEGATIVE
WBC # BLD: 10.4 THOU/MM3 (ref 4.8–10.8)

## 2021-10-28 PROCEDURE — 84466 ASSAY OF TRANSFERRIN: CPT

## 2021-10-28 PROCEDURE — 6360000002 HC RX W HCPCS: Performed by: FAMILY MEDICINE

## 2021-10-28 PROCEDURE — 83880 ASSAY OF NATRIURETIC PEPTIDE: CPT

## 2021-10-28 PROCEDURE — 85610 PROTHROMBIN TIME: CPT

## 2021-10-28 PROCEDURE — 83540 ASSAY OF IRON: CPT

## 2021-10-28 PROCEDURE — 6360000002 HC RX W HCPCS: Performed by: STUDENT IN AN ORGANIZED HEALTH CARE EDUCATION/TRAINING PROGRAM

## 2021-10-28 PROCEDURE — 87147 CULTURE TYPE IMMUNOLOGIC: CPT

## 2021-10-28 PROCEDURE — 85027 COMPLETE CBC AUTOMATED: CPT

## 2021-10-28 PROCEDURE — 2700000000 HC OXYGEN THERAPY PER DAY

## 2021-10-28 PROCEDURE — 2580000003 HC RX 258: Performed by: STUDENT IN AN ORGANIZED HEALTH CARE EDUCATION/TRAINING PROGRAM

## 2021-10-28 PROCEDURE — 36600 WITHDRAWAL OF ARTERIAL BLOOD: CPT

## 2021-10-28 PROCEDURE — 2500000003 HC RX 250 WO HCPCS: Performed by: STUDENT IN AN ORGANIZED HEALTH CARE EDUCATION/TRAINING PROGRAM

## 2021-10-28 PROCEDURE — 2580000003 HC RX 258: Performed by: EMERGENCY MEDICINE

## 2021-10-28 PROCEDURE — 6360000002 HC RX W HCPCS: Performed by: PHARMACIST

## 2021-10-28 PROCEDURE — 2500000003 HC RX 250 WO HCPCS: Performed by: FAMILY MEDICINE

## 2021-10-28 PROCEDURE — 95816 EEG AWAKE AND DROWSY: CPT

## 2021-10-28 PROCEDURE — 94003 VENT MGMT INPAT SUBQ DAY: CPT

## 2021-10-28 PROCEDURE — 82728 ASSAY OF FERRITIN: CPT

## 2021-10-28 PROCEDURE — 6370000000 HC RX 637 (ALT 250 FOR IP): Performed by: FAMILY MEDICINE

## 2021-10-28 PROCEDURE — 93005 ELECTROCARDIOGRAM TRACING: CPT | Performed by: STUDENT IN AN ORGANIZED HEALTH CARE EDUCATION/TRAINING PROGRAM

## 2021-10-28 PROCEDURE — 82803 BLOOD GASES ANY COMBINATION: CPT

## 2021-10-28 PROCEDURE — 87070 CULTURE OTHR SPECIMN AEROBIC: CPT

## 2021-10-28 PROCEDURE — 85520 HEPARIN ASSAY: CPT

## 2021-10-28 PROCEDURE — 84100 ASSAY OF PHOSPHORUS: CPT

## 2021-10-28 PROCEDURE — 80048 BASIC METABOLIC PNL TOTAL CA: CPT

## 2021-10-28 PROCEDURE — 2580000003 HC RX 258: Performed by: PHARMACIST

## 2021-10-28 PROCEDURE — 93306 TTE W/DOPPLER COMPLETE: CPT

## 2021-10-28 PROCEDURE — 71045 X-RAY EXAM CHEST 1 VIEW: CPT

## 2021-10-28 PROCEDURE — 6360000002 HC RX W HCPCS: Performed by: EMERGENCY MEDICINE

## 2021-10-28 PROCEDURE — 36415 COLL VENOUS BLD VENIPUNCTURE: CPT

## 2021-10-28 PROCEDURE — 2500000003 HC RX 250 WO HCPCS

## 2021-10-28 PROCEDURE — 87077 CULTURE AEROBIC IDENTIFY: CPT

## 2021-10-28 PROCEDURE — 83735 ASSAY OF MAGNESIUM: CPT

## 2021-10-28 PROCEDURE — 99291 CRITICAL CARE FIRST HOUR: CPT | Performed by: SURGERY

## 2021-10-28 PROCEDURE — 94761 N-INVAS EAR/PLS OXIMETRY MLT: CPT

## 2021-10-28 PROCEDURE — 87205 SMEAR GRAM STAIN: CPT

## 2021-10-28 PROCEDURE — C9113 INJ PANTOPRAZOLE SODIUM, VIA: HCPCS | Performed by: STUDENT IN AN ORGANIZED HEALTH CARE EDUCATION/TRAINING PROGRAM

## 2021-10-28 PROCEDURE — 87186 SC STD MICRODIL/AGAR DIL: CPT

## 2021-10-28 PROCEDURE — 2580000003 HC RX 258: Performed by: FAMILY MEDICINE

## 2021-10-28 PROCEDURE — 2000000000 HC ICU R&B

## 2021-10-28 PROCEDURE — 84484 ASSAY OF TROPONIN QUANT: CPT

## 2021-10-28 PROCEDURE — 94640 AIRWAY INHALATION TREATMENT: CPT

## 2021-10-28 RX ORDER — HEPARIN SODIUM 1000 [USP'U]/ML
52 INJECTION, SOLUTION INTRAVENOUS; SUBCUTANEOUS PRN
Status: DISCONTINUED | OUTPATIENT
Start: 2021-10-28 | End: 2021-11-07 | Stop reason: ALTCHOICE

## 2021-10-28 RX ORDER — PANTOPRAZOLE SODIUM 40 MG/10ML
40 INJECTION, POWDER, LYOPHILIZED, FOR SOLUTION INTRAVENOUS DAILY
Status: DISCONTINUED | OUTPATIENT
Start: 2021-10-28 | End: 2021-10-31

## 2021-10-28 RX ORDER — METOPROLOL TARTRATE 5 MG/5ML
INJECTION INTRAVENOUS
Status: COMPLETED
Start: 2021-10-28 | End: 2021-10-28

## 2021-10-28 RX ORDER — HEPARIN SODIUM 10000 [USP'U]/100ML
5-30 INJECTION, SOLUTION INTRAVENOUS CONTINUOUS
Status: DISCONTINUED | OUTPATIENT
Start: 2021-10-28 | End: 2021-11-07 | Stop reason: ALTCHOICE

## 2021-10-28 RX ORDER — HEPARIN SODIUM 1000 [USP'U]/ML
60 INJECTION, SOLUTION INTRAVENOUS; SUBCUTANEOUS PRN
Status: DISCONTINUED | OUTPATIENT
Start: 2021-10-28 | End: 2021-10-28

## 2021-10-28 RX ORDER — HEPARIN SODIUM 1000 [USP'U]/ML
60 INJECTION, SOLUTION INTRAVENOUS; SUBCUTANEOUS ONCE
Status: DISCONTINUED | OUTPATIENT
Start: 2021-10-28 | End: 2021-10-28

## 2021-10-28 RX ORDER — 0.9 % SODIUM CHLORIDE 0.9 %
500 INTRAVENOUS SOLUTION INTRAVENOUS ONCE
Status: COMPLETED | OUTPATIENT
Start: 2021-10-28 | End: 2021-10-28

## 2021-10-28 RX ORDER — HEPARIN SODIUM 1000 [USP'U]/ML
26 INJECTION, SOLUTION INTRAVENOUS; SUBCUTANEOUS PRN
Status: DISCONTINUED | OUTPATIENT
Start: 2021-10-28 | End: 2021-11-07 | Stop reason: ALTCHOICE

## 2021-10-28 RX ORDER — HEPARIN SODIUM 1000 [USP'U]/ML
52 INJECTION, SOLUTION INTRAVENOUS; SUBCUTANEOUS ONCE
Status: COMPLETED | OUTPATIENT
Start: 2021-10-28 | End: 2021-10-28

## 2021-10-28 RX ORDER — HEPARIN SODIUM 1000 [USP'U]/ML
30 INJECTION, SOLUTION INTRAVENOUS; SUBCUTANEOUS PRN
Status: DISCONTINUED | OUTPATIENT
Start: 2021-10-28 | End: 2021-10-28

## 2021-10-28 RX ORDER — METOPROLOL TARTRATE 5 MG/5ML
5 INJECTION INTRAVENOUS ONCE
Status: COMPLETED | OUTPATIENT
Start: 2021-10-28 | End: 2021-10-28

## 2021-10-28 RX ORDER — SODIUM CHLORIDE 9 MG/ML
25 INJECTION, SOLUTION INTRAVENOUS PRN
Status: DISCONTINUED | OUTPATIENT
Start: 2021-10-28 | End: 2021-11-24 | Stop reason: SDUPTHER

## 2021-10-28 RX ADMIN — Medication 12 MCG/MIN: at 22:48

## 2021-10-28 RX ADMIN — SODIUM CHLORIDE: 9 INJECTION, SOLUTION INTRAVENOUS at 23:14

## 2021-10-28 RX ADMIN — HEPARIN SODIUM 3910 UNITS: 1000 INJECTION, SOLUTION INTRAVENOUS; SUBCUTANEOUS at 12:03

## 2021-10-28 RX ADMIN — SODIUM CHLORIDE 500 ML: 9 INJECTION, SOLUTION INTRAVENOUS at 08:24

## 2021-10-28 RX ADMIN — ALLOPURINOL 300 MG: 300 TABLET ORAL at 09:49

## 2021-10-28 RX ADMIN — MIDAZOLAM 1 MG/HR: 5 INJECTION, SOLUTION INTRAMUSCULAR; INTRAVENOUS at 00:20

## 2021-10-28 RX ADMIN — METOPROLOL TARTRATE 5 MG: 5 INJECTION INTRAVENOUS at 19:39

## 2021-10-28 RX ADMIN — FENTANYL CITRATE 75 MCG/HR: 50 INJECTION INTRAMUSCULAR; INTRAVENOUS at 13:20

## 2021-10-28 RX ADMIN — ASPIRIN 81 MG: 81 TABLET, FILM COATED ORAL at 09:49

## 2021-10-28 RX ADMIN — ALBUTEROL SULFATE 2.5 MG: 2.5 SOLUTION RESPIRATORY (INHALATION) at 12:59

## 2021-10-28 RX ADMIN — CEFTRIAXONE SODIUM 1000 MG: 1 INJECTION, POWDER, FOR SOLUTION INTRAMUSCULAR; INTRAVENOUS at 00:30

## 2021-10-28 RX ADMIN — VANCOMYCIN HYDROCHLORIDE 1750 MG: 5 INJECTION, POWDER, LYOPHILIZED, FOR SOLUTION INTRAVENOUS at 08:19

## 2021-10-28 RX ADMIN — FENTANYL CITRATE 100 MCG/HR: 50 INJECTION INTRAMUSCULAR; INTRAVENOUS at 01:32

## 2021-10-28 RX ADMIN — HEPARIN SODIUM 1950 UNITS: 1000 INJECTION, SOLUTION INTRAVENOUS; SUBCUTANEOUS at 17:17

## 2021-10-28 RX ADMIN — HEPARIN SODIUM 12 UNITS/KG/HR: 10000 INJECTION, SOLUTION INTRAVENOUS at 12:03

## 2021-10-28 RX ADMIN — ALBUTEROL SULFATE 2.5 MG: 2.5 SOLUTION RESPIRATORY (INHALATION) at 17:42

## 2021-10-28 RX ADMIN — AMIODARONE HYDROCHLORIDE 0.5 MG/MIN: 1.8 INJECTION, SOLUTION INTRAVENOUS at 13:44

## 2021-10-28 RX ADMIN — SODIUM CHLORIDE, PRESERVATIVE FREE 10 ML: 5 INJECTION INTRAVENOUS at 09:49

## 2021-10-28 RX ADMIN — ALBUTEROL SULFATE 2.5 MG: 2.5 SOLUTION RESPIRATORY (INHALATION) at 23:32

## 2021-10-28 RX ADMIN — AMIODARONE HYDROCHLORIDE 150 MG: 1.5 INJECTION, SOLUTION INTRAVENOUS at 08:19

## 2021-10-28 RX ADMIN — SODIUM CHLORIDE, PRESERVATIVE FREE 10 ML: 5 INJECTION INTRAVENOUS at 23:33

## 2021-10-28 RX ADMIN — CEFTRIAXONE SODIUM 1000 MG: 1 INJECTION, POWDER, FOR SOLUTION INTRAMUSCULAR; INTRAVENOUS at 23:33

## 2021-10-28 RX ADMIN — DOXYCYCLINE HYCLATE 100 MG: 100 TABLET, COATED ORAL at 01:24

## 2021-10-28 RX ADMIN — AMIODARONE HYDROCHLORIDE 1 MG/MIN: 1.8 INJECTION, SOLUTION INTRAVENOUS at 08:22

## 2021-10-28 RX ADMIN — HEPARIN SODIUM 5000 UNITS: 5000 INJECTION INTRAVENOUS; SUBCUTANEOUS at 06:04

## 2021-10-28 RX ADMIN — PANTOPRAZOLE SODIUM 40 MG: 40 INJECTION, POWDER, FOR SOLUTION INTRAVENOUS at 12:00

## 2021-10-28 ASSESSMENT — PULMONARY FUNCTION TESTS
PIF_VALUE: 26
PIF_VALUE: 23
PIF_VALUE: 25
PIF_VALUE: 22
PIF_VALUE: 22
PIF_VALUE: 25

## 2021-10-28 NOTE — PROGRESS NOTES
[facility] Laboratory Technician Worksheet      EEG Date: 10/28/2021    Name: Alcides Lopez   : 1950   Age: 73 y.o. SEX: female    ROOM:  MRN: [id number]           CSN: [id number]      Ordering Provider: Aries Samuels DO  EEG Number: [id number] Time of Test:  3725    Hand: Right   Sedation: yes - Versed 2mg/hr    H.V. Done: No patient on vent Photic: Yes    Sleep: No  Drowsy: No   Sleep Deprived: No    Seizures observed: no    Mentality: sedated    Clinical History: Presented to ED 10/27/21 for shortness of breath and AMS. Mild headache reported on arrival and patient was lethargic. Patient found to have acute hypoxemic and hypercapnic respiratory failure and was intubated. History of CFH, PAH. RN reported bilateral twitching of the feet which the family reports as a normal behavior described as a \"nervous habit when she gets stressed. \"  Nursing also stated that they saw her eyes roll back in her head. Patient did open her eyes for doctor prior to start of EEG, but did not follow commands during testing.      Patient also on Fentanyl IV at 100mcg/hr      Past Medical History:       Diagnosis Date    CHF (congestive heart failure) ([facility] [address]     Dr. Cece Lyles Depression     Gout attack     Hypertension     Osteoarthritis     Pulmonary artery hypertension ([facility] [address]     [facility]-- Dr. Alaina Sung-- Dr. Cece Lyles Renal calculi     Dr. Jillian Willard Thrombocytopenia [facility])        Scheduled Meds:   vancomycin (VANCOCIN) intermittent dosing (placeholder)   Other RX Placeholder    pantoprazole  40 mg IntraVENous Daily    Riociguat  2.5 mg Oral Q8H    sodium chloride flush  5-40 mL IntraVENous 2 times per day    allopurinol  300 mg Oral Daily    aspirin  81 mg Oral Daily    [Held by provider] FLUoxetine  40 mg Oral Daily    [Held by provider] metoprolol tartrate  12.5 mg Oral BID    [Held by provider] bumetanide  1 mg IntraVENous Daily    albuterol  2.5 mg Nebulization Q6H    cefTRIAXone (ROCEPHIN) IV  1,000 mg IntraVENous Q24H     Continuous Infusions:   fentaNYL (SUBLIMAZE) 1250 mcg in sodium chloride 0.9 % 250 mL 100 mcg/hr (10/28/21 0132)    sodium chloride      amiodarone 1 mg/min (10/28/21 0457)    Followed by   [facility] amiodarone      heparin (PORCINE) Infusion 12 Units/kg/hr (10/28/21 1203)    norepinephrine 10 mcg/min (10/28/21 0645)    sodium chloride 50 mL/hr at 10/27/21 9056    midazolam 2 mg/hr (10/28/21 0541)     PRN Meds:.sodium chloride, heparin (porcine), heparin (porcine), sodium chloride flush    Technician: Zahra Mcfarlane 10/28/2021

## 2021-10-28 NOTE — FLOWSHEET NOTE
Pt was unresponsive and alone but she was anointed.       10/28/21 7390   Encounter Summary   Services provided to: Patient   Referral/Consult From: Rounding   [facility] Visiting Yes  (10/28 NR)   Complexity of Encounter Low   Length of Encounter 15 minutes   [facility]raments   Sacrament of Sick-Anointing Anointed

## 2021-10-28 NOTE — CARE COORDINATION
DISCHARGE/PLANNING EVALUATION  10/28/21, 3:04 PM EDT    Reason for Referral: [facility] with [facility]. Mental Status: alert and oriented pta. Decision Making: made own decisions pta. Family/Social/Home Environment: Assessment completed with pts sister Teetee Gonzalez due to pt being intubated. Pt resides in a one story home with a basement that she does not use. Sister states her brother lives with her and does all of the cooking, cleaning, laundry and provides transportation. Sister states she visits pt 2 x a day to help with dressing changes, sponge bathe and wash pts hair and whatever else needs done around the house. Sister states pt retired at 74 yrs of age and states pt has not been active since then. Pts brother tries to get pt to walk short distances 4x a day when she is willing. Sister states plan is for pt to return home at discharge with [facility]. Current Services including food security, transportation and housekeeping:  Brother and sister provide. Current Equipment: Walker, w/c, shower chair. Payment Source: Medicare. Concerns or Barriers to Discharge: None reported. Post acute provider list with quality measures, geographic area and applicable managed care information provided. Questions regarding selection process answered: current with [facility]. Teach Back Method used with sister regarding care plan and discharge planning. sister verbalize understanding of the plan of care and contribute to goal setting. Patient goals, treatment preferences and discharge plan: planning home with family at discharge and [facility] [address] [place] [address] Dr. Booth declined IPR or SNF at this time.     Electronically signed by OSIEL Terry on 10/28/2021 at 3:04 PM

## 2021-10-28 NOTE — PROGRESS NOTES
Pharmacy Note  Vancomycin Consult    Karen Barbosa is a 73 y.o. female started on Vancomycin for pneumonia; consult received from Dr. Randolph Mancera to manage therapy. Also receiving the following antibiotics: Ceftriaxone. Patient Active Problem List   Diagnosis    HTN (hypertension)    Chronic depression    CHF (congestive heart failure) (HCC)    Thrombocytopenia (HCC)    Moderate episode of recurrent major depressive disorder (HCC)    Renal failure syndrome    Hyperkalemia    Isolated non-nephrotic proteinuria    ALVIN (acute kidney injury) ([facility] [address]    Chronic right-sided heart failure (HCC)    Pulmonary HTN (HCC)    Hypotension due to hypovolemia    Acute renal failure superimposed on stage 4 chronic kidney disease (HCC)    Pressure ulcer of right buttock, stage 3 (HCC)    Acute respiratory failure (HCC)     Allergies:  Patient has no known allergies. Temp max: 97.9    Recent Labs     10/27/21  1350 10/28/21  0423   BUN 31* 31*   CREATININE 1.5* 1.5*   WBC 10.7  --        Intake/Output Summary (Last 24 hours) at 10/28/2021 0612  Last data filed at 10/28/2021 0330  Gross per 24 hour   Intake 1089 ml   Output 250 ml   Net 839 ml     Culture Date      Source                       Results  10/27/21              pneumonia panel       MRSA detected, adenovirus detected     Ht Readings from Last 1 Encounters:   10/25/21 4' 9\" (1.448 m)        Wt Readings from Last 1 Encounters:   10/27/21 165 lb 9.1 oz (75.1 kg)       Body mass index is 35.83 kg/m². CrCl = 32 ml/minute using adjusted body weight = 57.3 kg     Goal trough </= 15 mcg/ml     Assessment/Plan:  Vancomycin 1750 mg IV X 1 dose. Check vancomycin level ? 0/29/21 0600    Thank you for the consult. Will continue to follow.     Belinda Friend PharmD 10/28/2021 6:23 AM

## 2021-10-28 NOTE — H&P
CRITICAL CARE PROGRESS NOTE      Patient:  Garth Holland    Unit/Bed:4D-09/009-A  YOB: 1950  MRN: [id number]   PCP: Rne Linton MD  Date of Admission: 10/27/2021  Chief Complaint:- SOB    Assessment and Plan:    1. Acute hypercapnic respiratory failure: Likely secondary to obstructive sleep apnea, CPAP noncompliant. ABG on admission show pH 7.19, PCO2 80, PO2 40. Repeat ABG after intubation showed pH 7.39, PCO2 42, PO2 84. Plan: Lung protective strategy,      2. Pneumonia versus atelectasis: Chest x-ray on October 28 show persistent left lower lobe atelectasis versus consolidation. Procal 0.24, Lactic acid 0.8. Pneumonia panel (10/28): positive MRSA, Adenovirus. Plan: Vancomycin,    3. HFpEF: NYHA class II. Pulse likely secondary to pulmonary hypertension, hypertension echo with May 2021 show LVEF 55 to 60%, no regional left wall motion abnormality, no abnormality wall thickness, grade 2 diastolic dysfunction. BNP on admission 883. Patient had been seen by Dr. Noa Antoine cardiologist.  Last office visit in October 2021. Plan: Continue metoprolol,     4. Atrial fib without RVR: [id number] 4. Tele show A fib with -140s. Plan: EKG, Troponin, Amiodarone 150 bolus then drip, tele monitor, echo    5. Normocytic anemia: Hg 9.6 with patient Hg base line 9.3. MCV 85.9. Plan: iron study, occult blood test, Transfusion if Hg < 7    6. ALVIN on CKD stage III: BUN/Cr 31/1.5 Patient baseline BUN/Cr 13/1. Plan: Hold Bumex, NS 50 ml/hrs, bolus 500ml NS    7. Pulmonary hypertension: Can have been signed and documented obstructive sleep apnea in 2019, hadn't follow-up with pulmonologist over last 2 years. Patient did not use CPAP at night. Plan: CPAP after extubation    8. Obstructive sleep apnea: CPAP noncompliant. Plan: cont CPAP on extubation    9. Hyperkalemia: Resolve. K 5.3--> 4.7. Plan cont monitor  10. Troponin elevation  11. Sacral wound stage III: SP wound ostomy in September 2021.  Plan: Blood culture, abx (see 2), turn patient every 2 hours   12. Gout: Plan continue albuterol  12. DVT prophy    INITIAL H AND P AND ICU COURSE:  Goldie Lamas is 73years old female who presented to the hospital with shortness of breath. Past medical history significant with CHF, pulmonary hypertension, sacral wound stage III - s/p surgical wound ostomy (9/2021). Patient sister reports that patient home O2 Sat 51% thus family deiced to take her to the hospital.  In the emergency room, patient ABG show acute hypoxic hypercapnic respiratory failure with pH 7.19, PCO2 80, PO2 134. Patient was intubated. Repeat ABG showed pH 7.39, PCO2 42, PO2 87. Bumex was held due to ALVIN.   On 10/28, patient was transfer into ICU    Past Medical History      Diagnosis Date    CHF (congestive heart failure) ([facility] [address]     Dr. Chato Carrizales Depression     Gout attack     Hypertension     Osteoarthritis     Pulmonary artery hypertension ([facility] [address]     [facility]-- Dr. Darryl Favre-- Dr. Chato Carrizales Renal calculi     Dr. Ashley Hunter Thrombocytopenia [facility])         Past Surgical History        Procedure Laterality Date   [address]    PRESSURE ULCER DEBRIDEMENT Right 8/6/2021    EXCISION RIGHT BUTTOCK WOUND AND CLOSURE performed by Clarita Rios MD at [facility]       Current Medications    vancomycin  1,250 mg IntraVENous Q12H    sodium chloride flush  5-40 mL IntraVENous 2 times per day    allopurinol  300 mg Oral Daily    aspirin  81 mg Oral Daily    [Held by provider] FLUoxetine  40 mg Oral Daily    [Held by provider] metoprolol tartrate  12.5 mg Oral BID    Riociguat  2.5 mg Oral TID    heparin (porcine)  5,000 Units SubCUTAneous 3 times per day    [Held by provider] bumetanide  1 mg IntraVENous Daily    albuterol  2.5 mg Nebulization Q6H    cefTRIAXone (ROCEPHIN) IV  1,000 mg IntraVENous Q24H    magnesium sulfate  2,000 mg IntraVENous Once     sodium chloride flush, sodium chloride    IV Drips/Infusions   fentaNYL (SUBLIMAZE) 1250 mcg in sodium chloride 0.9 % 250 mL 100 mcg/hr (10/28/21 0132)    norepinephrine 13 mcg/min (10/28/21 0230)    sodium chloride      sodium chloride 50 mL/hr at 10/27/21 2276    midazolam 2 mg/hr (10/28/21 8782)       Home Medications  Medications Prior to Admission: metoprolol tartrate (LOPRESSOR) 25 MG tablet, Take 0.5 tablets by mouth 2 times daily  sodium hypochlorite (DAKINS) 0.125 % SOLN external solution, Apply Dakin's moistened gauze dressings to wound twice daily and as needed. sodium chloride 1 g tablet, Take 1 tablet by mouth 2 times daily  allopurinol (ZYLOPRIM) 300 MG tablet, Take 1 tablet by mouth daily  FLUoxetine (PROZAC) 40 MG capsule, Take 1 capsule by mouth daily  potassium chloride (KLOR-CON M) 10 MEQ extended release tablet, Take 1 tablet by mouth every other day  bumetanide (BUMEX) 1 MG tablet, Take 1 tablet by mouth daily  Multiple Vitamins-Minerals (MULTIVITAMIN WOMEN PO), Take 1 tablet by mouth daily  acetaminophen (TYLENOL) 650 MG extended release tablet, Take 650 mg by mouth every 8 hours as needed for Pain  Riociguat (ADEMPAS) 2.5 MG TABS, Take 2.5 mg by mouth 3 times daily  docusate sodium (COLACE) 100 MG capsule, Take 100 mg by mouth as needed   aspirin 81 MG tablet, Take 81 mg by mouth daily   ALLERGIES: Patient has no known allergies.     ROS   Can't obtain due to patient sedative on vent    Scheduled Meds:   vancomycin  1,250 mg IntraVENous Q12H    sodium chloride flush  5-40 mL IntraVENous 2 times per day    allopurinol  300 mg Oral Daily    aspirin  81 mg Oral Daily    [Held by provider] FLUoxetine  40 mg Oral Daily    [Held by provider] metoprolol tartrate  12.5 mg Oral BID    Riociguat  2.5 mg Oral TID    heparin (porcine)  5,000 Units SubCUTAneous 3 times per day    [Held by provider] bumetanide  1 mg IntraVENous Daily    albuterol  2.5 mg Nebulization Q6H    cefTRIAXone (ROCEPHIN) IV  1,000 mg IntraVENous Q24H    magnesium sulfate  2,000 mg IntraVENous Once     Continuous Infusions:   fentaNYL (SUBLIMAZE) 1250 mcg in sodium chloride 0.9 % 250 mL 100 mcg/hr (10/28/21 0132)    norepinephrine 13 mcg/min (10/28/21 0230)    sodium chloride      sodium chloride 50 mL/hr at 10/27/21 9466    midazolam 2 mg/hr (10/28/21 5903)       PHYSICAL EXAMINATION:  T:  97.9. P:  71. RR:  16. B/P: 92/59. FiO2: 30 . O2 Sat:  98.  I/O: 0.9L  Body mass index is 35.83 kg/m². GCS:  8   General:   HEENT:  normocephalic and atraumatic. No scleral icterus. PERR  Neck: supple. No Thyromegaly. Lungs: clear to auscultation. No retractions  Cardiac: RRR. No JVD. Abdomen: soft. Nontender. Extremities:  No clubbing, cyanosis, or edema x 4. Vasculature: capillary refill < 3 seconds. Palpable dorsalis pedis pulses. Skin:  warm and dry. Psych:  Alert and oriented x3. Affect appropriate  Lymph:  No supraclavicular adenopathy. Neurologic:  No focal deficit. No seizures. Data: (All radiographs, tracings, PFTs, and imaging are personally viewed and interpreted unless otherwise noted).  Sodium 142, potassium 4.7, chloride 100 08, bicarb 26, BUN/Cr 31/1.5   WBC 10.7, hemoglobin 9.6, hematocrit 33.6, platelets 401   Telemetry shows normal sinus rhythm   Pneumonia panel positive for MRSA, adenovirus - Vancomycin (Started 10/27)          Seen with multidisciplinary ICU team.  Meets Continued ICU Level Care Criteria:    [x] Yes   [] No - Transfer Planned to listed location:  [] HOSPITALIST CONTACTED-      Case and plan discussed with Dr. Chapman Jay Jay.         Electronically signed by [facility]  CRITICAL CARE SPECIALIST

## 2021-10-28 NOTE — PROGRESS NOTES
Comprehensive Nutrition Assessment    Type and Reason for Visit:  Initial, Consult (TF)    Nutrition Recommendations/Plan:   Per Alyssa Rivera RN plan heart cath today. When able , plan Nepro TF at 10 ml/hr & increase 10 ml every 8 hours as tolerated to goal of 30 ml/hr. Discussed with Alyssa Rivera RN.    Free H20 flush per Dr  Nutrition Assessment:    Pt. nutritionally compromised AEB NPO status. At risk for further nutrition compromise r/t admitted with acute respiratory failure, intubated 10/27, per RN plan heart cath today, AFib, ALVIN, stage 3 sacral wound, anemia and underlying medical condition (CKD, s/p ID of buttock wound 8/6/21,CHF, gout, pulmonary HTN, obesity ). Nutrition recommendations/interventions as per above. Malnutrition Assessment:  Malnutrition Status: At risk for malnutrition (Comment)    Context:  Acute Illness     Findings of the 6 clinical characteristics of malnutrition:  Energy Intake:  Unable to assess  Weight Loss:   (5.8% weight loss in 3.5 months)     Body Fat Loss:  No significant body fat loss     Muscle Mass Loss:  Unable to assess    Fluid Accumulation:  1 - Mild     Strength:  Not Performed    Estimated Daily Nutrient Needs:  Energy (kcal):  ~1127 kcals (15); Weight Used for Energy Requirements:  Admission (75.1 kg)     Protein (g):  ~59 grams ( 1.4) monitor reanl status; Weight Used for Protein Requirements:  Ideal (42.3 kg)        Fluid (ml/day):  per Dr;         Nutrition Related Findings:   Pt  intubated 10/27, per RN plan heart cath today . Discussed pt with Alyssa Rivera RN & ok for TF following. NPO, OG. MAP 66, meds include ATB, fentanyl versed, levophed. Bumex held. BUN 31, Cr 1.5, Hgb 8.5.     Wounds:  Stage III (on coccyx per RN)       Current Nutrition Therapies:    Current Tube Feeding (TF) Orders:  · Feeding Route: Orogastric  · Formula: Renal Formula (Nepro)  · Schedule: Continuous (goal 30 ml/hr)  · Additives/Modulars:  (.)  · Water Flushes: per Dr  · Current TF & Flush Orders Provides: start at 10 ml/hr  · Goal TF & Flush Orders Provides: 9383 kcals, 58 grams protein, 115 grams CHO, 18 grams fiber, 523 ml free H20 in 720 ml TF/24 hours      Anthropometric Measures:  · Height: 4' 9\" (144.8 cm)  · Current Body Weight: 165 lb 9.1 oz (75.1 kg) (10/27 +1 edema)   · Admission Body Weight: 165 lb 9.1 oz (75.1 kg) (10/27 +1 edema)    · Usual Body Weight: 175 lb 11.3 oz (79.7 kg) (per EMR 7/16/21)     · BMI: 35.8  · Adjusted Body Weight:  ;  (IBW ~93 #)   · BMI Categories: Obese Class 2 (BMI 35.0 -39.9)       Nutrition Diagnosis:   · Inadequate oral intake related to impaired respiratory function as evidenced by intubation      Nutrition Interventions:   Food and/or Nutrient Delivery:  Start Tube Feeding  Nutrition Education/Counseling:  Education not appropriate   Coordination of Nutrition Care:  Continue to monitor while inpatient, Interdisciplinary Rounds    Goals:  TF to provide % of nutrient needs while pt is intubated. Nutrition Monitoring and Evaluation:   Behavioral-Environmental Outcomes:  None Identified   Food/Nutrient Intake Outcomes:  Enteral Nutrition Intake/Tolerance  Physical Signs/Symptoms Outcomes:  Biochemical Data, GI Status, Nausea or Vomiting, Fluid Status or Edema, Hemodynamic Status, Nutrition Focused Physical Findings, Skin, Weight     Discharge Planning:     Too soon to determine     Electronically signed by Neto Marr RD, LD on 10/28/21 at 2:54 PM EDT    Contact: [phone]

## 2021-10-28 NOTE — PROCEDURES
[address] [id number]                          ELECTROENCEPHALOGRAM REPORT    PATIENT NAME: Sandra PENALOZA                      :        1950  MED REC NO:   [id number]                           ROOM:       0009  ACCOUNT NO:   [de-identified]                           ADMIT DATE: 10/27/2021  PROVIDER:     Sameer Goldstein. Skip Worthy MD    DATE OF EEG:  10/28/2021    REFERRING PROVIDER:  Dr. Johnathon Kline. CLINICAL HISTORY:  A 24-year-old female presenting with shortness of  breath, altered mental status, headaches, lethargy, evaluate for  seizure. Medications listed are vancomycin, pantoprazole, allopurinol,  riociguat, fluoxetine, metoprolol, bumetanide, Rocephin, albuterol,  fentanyl, amiodarone, heparin, norepinephrine, midazolam.    CLINICAL INTERPRETATION:  This is a 17-channel EEG performed without  sleep deprivation. Hyperventilation was not performed. Photic  stimulation was performed. The patient is described as sedated on the  ventilator. The background is noted to be slowed. Excessive slowing was seen in the  delta range suggestive of cortical dysfunction such as seen with  encephalopathy. There was no definitive evidence of epileptiform  activity appreciated. The patient is noted to be asleep throughout the  study. There were no clinical seizures observed. The patient had arm  jerk, and shoulder move that did not have a clear EEG correlate. IMPRESSION:  This recording reveals the patient being sedated throughout  the study. There was no definitive evidence of epileptiform activity  appreciated.         Bernardo Woods MD    D: 10/28/2021 15:32:20       T: 10/28/2021 15:35:54     [id number]  Job#: [id number]     Doc#: [id number]    CC:

## 2021-10-28 NOTE — PROGRESS NOTES
CRITICAL CARE PROGRESS NOTE      Patient:  Jakob Gaitan    Unit/Bed:4D-09/009-A  YOB: 1950  MRN: [id number]   PCP: Zain Barnett MD  Date of Admission: 10/27/2021  Chief Complaint:- SOB    Assessment and Plan:    1. Acute hypercapnic hypoxic respiratory failure: Likely secondary to obstructive sleep apnea, CPAP noncompliant. ABG on admission show pH 7.19, PCO2 80, PO2 40. Repeat ABG after intubation showed pH 7.39, PCO2 42, PO2 84. Plan: Lung protective strategy, cont vent support.     2. Pneumonia versus atelectasis: Chest x-ray on October 28 show persistent left lower lobe atelectasis versus consolidation. Procal 0.24, Lactic acid 0.8. Pneumonia panel (10/28): positive MRSA, Adenovirus. Plan: Vancomycin,     3. Exudate Left pleural effusion: s/p thoracocentesis (10/27) with 1.1L purulent fluid drainage. LDH resume LDH pleural/serum 114/134. Cell count no evidence of malignant cell. Plan: CXR if worsening SOB    4. HFpEF: NYHA class II. Pulse likely secondary to pulmonary hypertension, hypertension echo with May 2021 show LVEF 55 to 60%, no regional left wall motion abnormality, no abnormality wall thickness, grade 2 diastolic dysfunction. BNP on admission 883. Patient had been seen by Dr. Otf Llamas cardiologist.  Last office visit in October 2021. Plan: Hold metoprolol dur to hypotension     4. Atrial fib without RVR: [id number] 4. Tele show A fib with -140s. Plan: EKG, Troponin, tele monitor, echo, Amiodarone 150 bolus then drip, Lovenox 80 mg BID     5. Normocytic anemia: Hg 9.6 with patient Hg base line 9.3. MCV 85.9. Plan: iron study, occult blood test, Transfusion if Hg < 7     6. ALVIN on CKD stage III: BUN/Cr 31/1.5 Patient baseline BUN/Cr 13/1. Plan: Hold Bumex, NS 50 ml/hrs, bolus 500ml NS     7. Pulmonary hypertension: Can have been signed and documented obstructive sleep apnea in 2019, hadn't follow-up with pulmonologist over last 2 years. Patient did not use CPAP at night. Plan: CPAP after extubation     8. Obstructive sleep apnea: CPAP noncompliant. Plan: cont CPAP on extubation     9. Hyperkalemia: Resolve. K 5.3--> 4.7. Plan cont monitor  10. Troponin elevation  11. Sacral wound stage III: SP wound ostomy in September 2021. Plan: Blood culture, abx (see 2), turn patient every 2 hours   12. Gout: Plan continue albuterol  12. DVT prophy: Lovenox  13: PUD propjhy: Pepcid    INITIAL H AND P AND ICU COURSE:  Julien Rolle is 73years old female who presented to the hospital with shortness of breath. Past medical history significant with CHF, pulmonary hypertension, sacral wound stage III - s/p surgical wound ostomy (9/2021). Patient sister reports that patient home O2 Sat 51% thus family deiced to take her to the hospital.  In the emergency room, patient ABG show acute hypoxic hypercapnic respiratory failure with pH 7.19, PCO2 80, PO2 134. Patient was intubated. Repeat ABG showed pH 7.39, PCO2 42, PO2 87. Bumex was held due to ALVIN.   On 10/28, patient was transfer into ICU    Past Medical History      Diagnosis Date    CHF (congestive heart failure) ([facility] [address]     Dr. Miheala Restrepo Depression     Gout attack     Hypertension     Osteoarthritis     Pulmonary artery hypertension ([facility] [address]     [facility]-- Dr. Dorian Hill-- Dr. Mihaela Restrepo Renal calculi     Dr. Ricky Paulson Thrombocytopenia [facility])         Past Surgical History        Procedure Laterality Date   [address]    PRESSURE ULCER DEBRIDEMENT Right 8/6/2021    EXCISION RIGHT BUTTOCK WOUND AND CLOSURE performed by Hayley Whalen MD at [facility]       Current Medications    vancomycin (VANCOCIN) intermittent dosing (placeholder)   Other RX Placeholder    vancomycin  1,750 mg IntraVENous Once    sodium chloride  500 mL IntraVENous Once    sodium chloride flush  5-40 mL IntraVENous 2 times per day    allopurinol  300 mg Oral Daily    aspirin  81 mg Oral Daily    FLUoxetine  40 mg Oral Daily    [Held by provider] metoprolol tartrate  12.5 mg Oral BID    Riociguat  2.5 mg Oral TID    heparin (porcine)  5,000 Units SubCUTAneous 3 times per day    [Held by provider] bumetanide  1 mg IntraVENous Daily    albuterol  2.5 mg Nebulization Q6H    cefTRIAXone (ROCEPHIN) IV  1,000 mg IntraVENous Q24H     sodium chloride, sodium chloride flush    IV Drips/Infusions   fentaNYL (SUBLIMAZE) 1250 mcg in sodium chloride 0.9 % 250 mL 100 mcg/hr (10/28/21 0132)    sodium chloride      amiodarone 1 mg/min (10/28/21 5198)    Followed by   Mercedes amiodarone      norepinephrine 10 mcg/min (10/28/21 0621)    sodium chloride 50 mL/hr at 10/27/21 8426    midazolam 2 mg/hr (10/28/21 9315)       Home Medications  Medications Prior to Admission: metoprolol tartrate (LOPRESSOR) 25 MG tablet, Take 0.5 tablets by mouth 2 times daily  sodium hypochlorite (DAKINS) 0.125 % SOLN external solution, Apply Dakin's moistened gauze dressings to wound twice daily and as needed. sodium chloride 1 g tablet, Take 1 tablet by mouth 2 times daily  allopurinol (ZYLOPRIM) 300 MG tablet, Take 1 tablet by mouth daily  FLUoxetine (PROZAC) 40 MG capsule, Take 1 capsule by mouth daily  potassium chloride (KLOR-CON M) 10 MEQ extended release tablet, Take 1 tablet by mouth every other day  bumetanide (BUMEX) 1 MG tablet, Take 1 tablet by mouth daily  Multiple Vitamins-Minerals (MULTIVITAMIN WOMEN PO), Take 1 tablet by mouth daily  acetaminophen (TYLENOL) 650 MG extended release tablet, Take 650 mg by mouth every 8 hours as needed for Pain  Riociguat (ADEMPAS) 2.5 MG TABS, Take 2.5 mg by mouth 3 times daily  docusate sodium (COLACE) 100 MG capsule, Take 100 mg by mouth as needed   aspirin 81 MG tablet, Take 81 mg by mouth daily   ALLERGIES: Patient has no known allergies.     ROS   Can't obtain due to patient sedative on vent    Scheduled Meds:   vancomycin (VANCOCIN) intermittent dosing (placeholder)   Other RX Placeholder    vancomycin  1,750 mg IntraVENous Once    sodium chloride  500 mL IntraVENous Once    sodium chloride flush  5-40 mL IntraVENous 2 times per day    allopurinol  300 mg Oral Daily    aspirin  81 mg Oral Daily    FLUoxetine  40 mg Oral Daily    [Held by provider] metoprolol tartrate  12.5 mg Oral BID    Riociguat  2.5 mg Oral TID    heparin (porcine)  5,000 Units SubCUTAneous 3 times per day    [Held by provider] bumetanide  1 mg IntraVENous Daily    albuterol  2.5 mg Nebulization Q6H    cefTRIAXone (ROCEPHIN) IV  1,000 mg IntraVENous Q24H     Continuous Infusions:   fentaNYL (SUBLIMAZE) 1250 mcg in sodium chloride 0.9 % 250 mL 100 mcg/hr (10/28/21 0132)    sodium chloride      amiodarone 1 mg/min (10/28/21 0822)    Followed by   Shilpa Melissa amiodarone      norepinephrine 10 mcg/min (10/28/21 0645)    sodium chloride 50 mL/hr at 10/27/21 2256    midazolam 2 mg/hr (10/28/21 0541)       PHYSICAL EXAMINATION:  T:  97.9. P:  71. RR:  16. B/P: 92/59. FiO2: 30 . O2 Sat:  98.  I/O: 0.9L  Body mass index is 35.83 kg/m². GCS:  8   General:  Sedated on vent. No ventilation desynchronization  HEENT:  normocephalic and atraumatic. No scleral icterus. PERR  Neck: supple. No Thyromegaly. Lungs: clear to auscultation. No retractions  Cardiac: RRR. No JVD. Abdomen: soft. Nontender. Extremities:  No clubbing, cyanosis, or edema x 4. Vasculature: capillary refill < 3 seconds. Palpable dorsalis pedis pulses. Skin:  warm and dry. Psych:  Alert and oriented x3. Affect appropriate  Lymph:  No supraclavicular adenopathy. Neurologic:  No focal deficit. No seizures. Data: (All radiographs, tracings, PFTs, and imaging are personally viewed and interpreted unless otherwise noted).    · Sodium 142, potassium 4.7, chloride 100 08, bicarb 26, BUN/Cr 31/1.5  · WBC 10.7, hemoglobin 9.6, hematocrit 33.6, platelets 870  · Telemetry shows normal sinus rhythm  · Pneumonia panel positive for MRSA, adenovirus - Vancomycin (Started 10/27)    Seen with multidisciplinary ICU team.  Meets Continued ICU Level Care Criteria:    [x] Yes   [] No - Transfer Planned to listed location:  [] HOSPITALIST CONTACTED-      CRITICAL CARE ATTESTATION    Patient seen by me. Case discussed with resident physician. CC time 35 minutes. Time was discontiguous. Patient has been on a heparin drip for new onset atrial fibrillation. TOBIAS ordered. We will plan for amiodarone conversion. Patient with adeno pneumonia and MRSA pneumonia on vancomycin. Time does not include procedures. Time does include my direct assessment of the patient and coordination of care.         Electronically signed by Liz Grace DO  Internal Medicine Resident Physician

## 2021-10-28 NOTE — CARE COORDINATION
10/28/21, 9:20 AM EDT  DISCHARGE PLANNING EVALUATION:    Beata Wu       Admitted: 10/27/2021/ [address] day: 1   Location: -09/009-A Reason for admit: Acute respiratory failure ([address] [J96.00]  Flash pulmonary edema ([address] [J81.0]  Acute respiratory failure with hypercapnia ([address] [J96.02]   PMH:  has a past medical history of CHF (congestive heart failure) ([address] Depression, Gout attack, Hypertension, Osteoarthritis, Pulmonary artery hypertension ([address] Renal calculi, and Thrombocytopenia ([address] Procedure:   10/27 CXR: Near complete opacification of the left hemithorax with very little aerated lung visualized in the left upper lobe. Small right pleural effusion and right lower lobe consolidation obscures visualization of the right hemidiaphragm pulmonary vascular congestion is observed  10/27 Intubated  10/27 CVC right subclavian  10/27 IR: Left thoracentesis with 1.1L removed  10/28 CXR:   1. Persistent left lower lobe atelectasis or consolidation. 2. Cardiomegaly with passive venous congestion. 3. Supportive devices in situ     Barriers to Discharge: Presented to ED with c/o worsening SOB with sats 51% on home pulse ox. On arrival to ED, sats 41% on room air. Required intubation. Left thoracentesis in IR with 1.1L removed. ALVIN on CKD. Admitted to ICU. EEG ordered. Cardiology consulted for new onset afib. Remains on vent w/ETT on PCV, peep 6, FIO2 25%, sats 95%. Afebrile. 's. Unable to follow commands; PAGAN x4 to painful stim. Respiratory culture +MRSA and adenovirus by PCR. Telemetry, CVC, OG to jurgen ESPINOSA, SCDs. IVF, amio @ 1 mg/min, fentanyl @ 100 mcg/hr, heparin drip, versed @ 2 mg/hr, levo @ 10 mcg/min, nebs, allopurinol, asa, IV rocephin, IV protonix, riociguat, IV vancomycin. Received 500 ml fld bolus x1. BUN 31, Creat 1.5, trop 0.059 - then 0.084, pro-bnp 972.1 - then 883, hgb 9.6 - now 8.5. Urine +large leukocytes - sent for culture. Blood cultures sent. COVID 19 was negative. PCP: Hudson Mccurdy MD  Readmission Risk Score: 15.7%    Patient Goals/Plan/Treatment Preferences: Spoke with Kenzie's nieces, Brett Contreras and Bret Smith; states Brenda Cvaazos lives at home alone and the patient's brother stays with her occasionally. She uses a walker at times, a shower chair, and has a hospital bed with a special mattress d/t her wound. [facility] with  [facility] and  [facility]. Her sister, Mozell Goodell, is her caregiver and assists her with ADLs. She does not drive, her family takes her to appoinments and she has a a PCP. She is curent with [address]. Plan pending clinical course. Will need PT/OT when appropriate. Transportation/Food Security/Housekeeping Addressed:  No issues identified. ICU Understanding Delirium pamphlet given to patient's family.

## 2021-10-29 ENCOUNTER — APPOINTMENT (OUTPATIENT)
Dept: GENERAL RADIOLOGY | Age: 71
DRG: 004 | End: 2021-10-29
Payer: MEDICARE

## 2021-10-29 ENCOUNTER — APPOINTMENT (OUTPATIENT)
Dept: CT IMAGING | Age: 71
DRG: 004 | End: 2021-10-29
Payer: MEDICARE

## 2021-10-29 ENCOUNTER — APPOINTMENT (OUTPATIENT)
Dept: CARDIAC CATH/INVASIVE PROCEDURES | Age: 71
DRG: 004 | End: 2021-10-29
Payer: MEDICARE

## 2021-10-29 LAB
ALBUMIN SERPL-MCNC: 2.5 G/DL (ref 3.5–5.1)
ALP BLD-CCNC: 103 U/L (ref 38–126)
ALT SERPL-CCNC: 9 U/L (ref 11–66)
ANION GAP SERPL CALCULATED.3IONS-SCNC: 14 MEQ/L (ref 8–16)
ANISOCYTOSIS: PRESENT
AST SERPL-CCNC: 17 U/L (ref 5–40)
ATYPICAL LYMPHOCYTES: ABNORMAL %
BASOPHILIA: ABNORMAL
BASOPHILIC STIPPLING: ABNORMAL
BASOPHILS # BLD: 0.4 %
BASOPHILS ABSOLUTE: 0.1 THOU/MM3 (ref 0–0.1)
BILIRUB SERPL-MCNC: < 0.2 MG/DL (ref 0.3–1.2)
BUN BLDV-MCNC: 28 MG/DL (ref 7–22)
CALCIUM IONIZED: 1.31 MMOL/L (ref 1.12–1.32)
CALCIUM SERPL-MCNC: 8.4 MG/DL (ref 8.5–10.5)
CHLORIDE BLD-SCNC: 105 MEQ/L (ref 98–111)
CO2: 21 MEQ/L (ref 23–33)
COLLECTED BY:: ABNORMAL
CREAT SERPL-MCNC: 1.4 MG/DL (ref 0.4–1.2)
EKG ATRIAL RATE: 70 BPM
EKG P AXIS: 38 DEGREES
EKG P-R INTERVAL: 190 MS
EKG Q-T INTERVAL: 360 MS
EKG Q-T INTERVAL: 480 MS
EKG QRS DURATION: 76 MS
EKG QRS DURATION: 84 MS
EKG QTC CALCULATION (BAZETT): 496 MS
EKG QTC CALCULATION (BAZETT): 518 MS
EKG R AXIS: 90 DEGREES
EKG R AXIS: 94 DEGREES
EKG T AXIS: -17 DEGREES
EKG T AXIS: 18 DEGREES
EKG VENTRICULAR RATE: 114 BPM
EKG VENTRICULAR RATE: 70 BPM
EOSINOPHIL # BLD: 1.3 %
EOSINOPHILS ABSOLUTE: 0.2 THOU/MM3 (ref 0–0.4)
ERYTHROCYTE [DISTWIDTH] IN BLOOD BY AUTOMATED COUNT: 16.9 % (ref 11.5–14.5)
ERYTHROCYTE [DISTWIDTH] IN BLOOD BY AUTOMATED COUNT: 52.5 FL (ref 35–45)
GFR SERPL CREATININE-BSD FRML MDRD: 37 ML/MIN/1.73M2
GLUCOSE BLD-MCNC: 160 MG/DL (ref 70–108)
HCT VFR BLD CALC: 28.7 % (ref 37–47)
HEMOGLOBIN: 8.3 GM/DL (ref 12–16)
HEPARIN UNFRACTIONATED: 0.05 U/ML (ref 0.3–0.7)
HEPARIN UNFRACTIONATED: 0.26 U/ML (ref 0.3–0.7)
HEPARIN UNFRACTIONATED: 0.46 U/ML (ref 0.3–0.7)
HYPOCHROMIA: PRESENT
IMMATURE GRANS (ABS): 0.08 THOU/MM3 (ref 0–0.07)
IMMATURE GRANULOCYTES: 0.6 %
IRON: 11 UG/DL (ref 50–170)
LYMPHOCYTES # BLD: 9.2 %
LYMPHOCYTES ABSOLUTE: 1.2 THOU/MM3 (ref 1–4.8)
MAGNESIUM: 1.7 MG/DL (ref 1.6–2.4)
MCH RBC QN AUTO: 24.9 PG (ref 26–33)
MCHC RBC AUTO-ENTMCNC: 28.9 GM/DL (ref 32.2–35.5)
MCV RBC AUTO: 85.9 FL (ref 81–99)
MONOCYTES # BLD: 6.4 %
MONOCYTES ABSOLUTE: 0.8 THOU/MM3 (ref 0.4–1.3)
MRSA SCREEN: NORMAL
NUCLEATED RED BLOOD CELLS: 0 /100 WBC
PHOSPHORUS: 3.5 MG/DL (ref 2.4–4.7)
PLATELET # BLD: 266 THOU/MM3 (ref 130–400)
PLATELET ESTIMATE: ADEQUATE
PMV BLD AUTO: 8.4 FL (ref 9.4–12.4)
POC O2 SATURATION: 84 % (ref 94–97)
POIKILOCYTES: ABNORMAL
POTASSIUM SERPL-SCNC: 4.4 MEQ/L (ref 3.5–5.2)
RBC # BLD: 3.34 MILL/MM3 (ref 4.2–5.4)
SCAN OF BLOOD SMEAR: NORMAL
SEG NEUTROPHILS: 82.1 %
SEGMENTED NEUTROPHILS ABSOLUTE COUNT: 10.7 THOU/MM3 (ref 1.8–7.7)
SMUDGE CELLS: PRESENT
SODIUM BLD-SCNC: 140 MEQ/L (ref 135–145)
SOURCE, BLOOD GAS: ABNORMAL
TOTAL IRON BINDING CAPACITY: 161 UG/DL (ref 171–450)
TOTAL PROTEIN: 5.3 G/DL (ref 6.1–8)
URINE CULTURE, ROUTINE: NORMAL
VANCOMYCIN RANDOM: 14.4 UG/ML (ref 0.1–39.9)
WBC # BLD: 13 THOU/MM3 (ref 4.8–10.8)

## 2021-10-29 PROCEDURE — 2000000000 HC ICU R&B

## 2021-10-29 PROCEDURE — 6360000002 HC RX W HCPCS: Performed by: STUDENT IN AN ORGANIZED HEALTH CARE EDUCATION/TRAINING PROGRAM

## 2021-10-29 PROCEDURE — 93451 RIGHT HEART CATH: CPT

## 2021-10-29 PROCEDURE — 6360000004 HC RX CONTRAST MEDICATION: Performed by: INTERNAL MEDICINE

## 2021-10-29 PROCEDURE — 6370000000 HC RX 637 (ALT 250 FOR IP): Performed by: INTERNAL MEDICINE

## 2021-10-29 PROCEDURE — 6360000002 HC RX W HCPCS: Performed by: FAMILY MEDICINE

## 2021-10-29 PROCEDURE — 80202 ASSAY OF VANCOMYCIN: CPT

## 2021-10-29 PROCEDURE — 93451 RIGHT HEART CATH: CPT | Performed by: INTERNAL MEDICINE

## 2021-10-29 PROCEDURE — 2500000003 HC RX 250 WO HCPCS: Performed by: STUDENT IN AN ORGANIZED HEALTH CARE EDUCATION/TRAINING PROGRAM

## 2021-10-29 PROCEDURE — 84100 ASSAY OF PHOSPHORUS: CPT

## 2021-10-29 PROCEDURE — 95822 EEG COMA OR SLEEP ONLY: CPT | Performed by: PSYCHIATRY & NEUROLOGY

## 2021-10-29 PROCEDURE — 99291 CRITICAL CARE FIRST HOUR: CPT | Performed by: INTERNAL MEDICINE

## 2021-10-29 PROCEDURE — 6360000002 HC RX W HCPCS: Performed by: EMERGENCY MEDICINE

## 2021-10-29 PROCEDURE — 83735 ASSAY OF MAGNESIUM: CPT

## 2021-10-29 PROCEDURE — 02HP32Z INSERTION OF MONITORING DEVICE INTO PULMONARY TRUNK, PERCUTANEOUS APPROACH: ICD-10-PCS | Performed by: INTERNAL MEDICINE

## 2021-10-29 PROCEDURE — 6360000002 HC RX W HCPCS: Performed by: INTERNAL MEDICINE

## 2021-10-29 PROCEDURE — 2500000003 HC RX 250 WO HCPCS: Performed by: FAMILY MEDICINE

## 2021-10-29 PROCEDURE — 2580000003 HC RX 258: Performed by: FAMILY MEDICINE

## 2021-10-29 PROCEDURE — 94640 AIRWAY INHALATION TREATMENT: CPT

## 2021-10-29 PROCEDURE — 36620 INSERTION CATHETER ARTERY: CPT

## 2021-10-29 PROCEDURE — 2580000003 HC RX 258: Performed by: EMERGENCY MEDICINE

## 2021-10-29 PROCEDURE — 85520 HEPARIN ASSAY: CPT

## 2021-10-29 PROCEDURE — 37799 UNLISTED PX VASCULAR SURGERY: CPT

## 2021-10-29 PROCEDURE — 71045 X-RAY EXAM CHEST 1 VIEW: CPT

## 2021-10-29 PROCEDURE — 4A1239Z MONITORING OF CARDIAC OUTPUT, PERCUTANEOUS APPROACH: ICD-10-PCS | Performed by: INTERNAL MEDICINE

## 2021-10-29 PROCEDURE — 4A023N6 MEASUREMENT OF CARDIAC SAMPLING AND PRESSURE, RIGHT HEART, PERCUTANEOUS APPROACH: ICD-10-PCS | Performed by: INTERNAL MEDICINE

## 2021-10-29 PROCEDURE — 6360000002 HC RX W HCPCS

## 2021-10-29 PROCEDURE — C1751 CATH, INF, PER/CENT/MIDLINE: HCPCS

## 2021-10-29 PROCEDURE — 71270 CT THORAX DX C-/C+: CPT

## 2021-10-29 PROCEDURE — C1894 INTRO/SHEATH, NON-LASER: HCPCS

## 2021-10-29 PROCEDURE — 85025 COMPLETE CBC W/AUTO DIFF WBC: CPT

## 2021-10-29 PROCEDURE — 82810 BLOOD GASES O2 SAT ONLY: CPT

## 2021-10-29 PROCEDURE — 93503 INSERT/PLACE HEART CATHETER: CPT

## 2021-10-29 PROCEDURE — 82330 ASSAY OF CALCIUM: CPT

## 2021-10-29 PROCEDURE — 80053 COMPREHEN METABOLIC PANEL: CPT

## 2021-10-29 PROCEDURE — 36620 INSERTION CATHETER ARTERY: CPT | Performed by: INTERNAL MEDICINE

## 2021-10-29 PROCEDURE — 2580000003 HC RX 258: Performed by: INTERNAL MEDICINE

## 2021-10-29 PROCEDURE — 2700000000 HC OXYGEN THERAPY PER DAY

## 2021-10-29 PROCEDURE — 36415 COLL VENOUS BLD VENIPUNCTURE: CPT

## 2021-10-29 PROCEDURE — 93005 ELECTROCARDIOGRAM TRACING: CPT | Performed by: INTERNAL MEDICINE

## 2021-10-29 PROCEDURE — C9113 INJ PANTOPRAZOLE SODIUM, VIA: HCPCS | Performed by: STUDENT IN AN ORGANIZED HEALTH CARE EDUCATION/TRAINING PROGRAM

## 2021-10-29 PROCEDURE — 2500000003 HC RX 250 WO HCPCS: Performed by: INTERNAL MEDICINE

## 2021-10-29 PROCEDURE — 36592 COLLECT BLOOD FROM PICC: CPT

## 2021-10-29 PROCEDURE — 94003 VENT MGMT INPAT SUBQ DAY: CPT

## 2021-10-29 PROCEDURE — 4A133B3 MONITORING OF ARTERIAL PRESSURE, PULMONARY, PERCUTANEOUS APPROACH: ICD-10-PCS | Performed by: INTERNAL MEDICINE

## 2021-10-29 PROCEDURE — 94761 N-INVAS EAR/PLS OXIMETRY MLT: CPT

## 2021-10-29 PROCEDURE — 2580000003 HC RX 258: Performed by: STUDENT IN AN ORGANIZED HEALTH CARE EDUCATION/TRAINING PROGRAM

## 2021-10-29 PROCEDURE — 2500000003 HC RX 250 WO HCPCS

## 2021-10-29 PROCEDURE — 6370000000 HC RX 637 (ALT 250 FOR IP): Performed by: FAMILY MEDICINE

## 2021-10-29 RX ORDER — SODIUM CHLORIDE 9 MG/ML
25 INJECTION, SOLUTION INTRAVENOUS PRN
Status: DISCONTINUED | OUTPATIENT
Start: 2021-10-29 | End: 2021-10-29 | Stop reason: SDUPTHER

## 2021-10-29 RX ORDER — ACETAMINOPHEN 325 MG/1
650 TABLET ORAL EVERY 4 HOURS PRN
Status: DISCONTINUED | OUTPATIENT
Start: 2021-10-29 | End: 2021-12-01 | Stop reason: HOSPADM

## 2021-10-29 RX ORDER — METOPROLOL TARTRATE 5 MG/5ML
5 INJECTION INTRAVENOUS EVERY 6 HOURS
Status: DISCONTINUED | OUTPATIENT
Start: 2021-10-29 | End: 2021-10-29

## 2021-10-29 RX ORDER — POTASSIUM CHLORIDE 20 MEQ/1
10 TABLET, EXTENDED RELEASE ORAL 2 TIMES DAILY WITH MEALS
Status: DISCONTINUED | OUTPATIENT
Start: 2021-10-29 | End: 2021-11-02

## 2021-10-29 RX ORDER — BUMETANIDE 0.25 MG/ML
1 INJECTION, SOLUTION INTRAMUSCULAR; INTRAVENOUS 2 TIMES DAILY
Status: DISCONTINUED | OUTPATIENT
Start: 2021-10-29 | End: 2021-10-30

## 2021-10-29 RX ORDER — SODIUM CHLORIDE 0.9 % (FLUSH) 0.9 %
5-40 SYRINGE (ML) INJECTION EVERY 12 HOURS SCHEDULED
Status: DISCONTINUED | OUTPATIENT
Start: 2021-10-29 | End: 2021-10-29 | Stop reason: SDUPTHER

## 2021-10-29 RX ORDER — SODIUM CHLORIDE 0.9 % (FLUSH) 0.9 %
5-40 SYRINGE (ML) INJECTION PRN
Status: DISCONTINUED | OUTPATIENT
Start: 2021-10-29 | End: 2021-10-29 | Stop reason: SDUPTHER

## 2021-10-29 RX ORDER — SODIUM HYPOCHLORITE 1.25 MG/ML
SOLUTION TOPICAL DAILY
Status: DISCONTINUED | OUTPATIENT
Start: 2021-10-29 | End: 2021-11-29

## 2021-10-29 RX ORDER — METOPROLOL TARTRATE 5 MG/5ML
INJECTION INTRAVENOUS
Status: DISPENSED
Start: 2021-10-29 | End: 2021-10-29

## 2021-10-29 RX ORDER — DEXAMETHASONE SODIUM PHOSPHATE 4 MG/ML
6 INJECTION, SOLUTION INTRA-ARTICULAR; INTRALESIONAL; INTRAMUSCULAR; INTRAVENOUS; SOFT TISSUE DAILY
Status: COMPLETED | OUTPATIENT
Start: 2021-10-29 | End: 2021-11-02

## 2021-10-29 RX ADMIN — Medication 19 MCG/MIN: at 13:25

## 2021-10-29 RX ADMIN — ALBUTEROL SULFATE 2.5 MG: 2.5 SOLUTION RESPIRATORY (INHALATION) at 17:44

## 2021-10-29 RX ADMIN — BUMETANIDE 1 MG: 0.25 INJECTION, SOLUTION INTRAMUSCULAR; INTRAVENOUS at 21:23

## 2021-10-29 RX ADMIN — POTASSIUM CHLORIDE 10 MEQ: 1500 TABLET, EXTENDED RELEASE ORAL at 16:11

## 2021-10-29 RX ADMIN — VANCOMYCIN HYDROCHLORIDE 1250 MG: 5 INJECTION, POWDER, LYOPHILIZED, FOR SOLUTION INTRAVENOUS at 13:28

## 2021-10-29 RX ADMIN — PANTOPRAZOLE SODIUM 40 MG: 40 INJECTION, POWDER, FOR SOLUTION INTRAVENOUS at 13:17

## 2021-10-29 RX ADMIN — AMIODARONE HYDROCHLORIDE 0.5 MG/MIN: 1.8 INJECTION, SOLUTION INTRAVENOUS at 16:44

## 2021-10-29 RX ADMIN — ALBUTEROL SULFATE 2.5 MG: 2.5 SOLUTION RESPIRATORY (INHALATION) at 13:16

## 2021-10-29 RX ADMIN — DEXAMETHASONE SODIUM PHOSPHATE 6 MG: 4 INJECTION, SOLUTION INTRA-ARTICULAR; INTRALESIONAL; INTRAMUSCULAR; INTRAVENOUS; SOFT TISSUE at 15:23

## 2021-10-29 RX ADMIN — IRON SUCROSE 400 MG: 20 INJECTION, SOLUTION INTRAVENOUS at 13:18

## 2021-10-29 RX ADMIN — AMIODARONE HYDROCHLORIDE 0.5 MG/MIN: 1.8 INJECTION, SOLUTION INTRAVENOUS at 02:52

## 2021-10-29 RX ADMIN — METOPROLOL TARTRATE 5 MG: 5 INJECTION INTRAVENOUS at 07:02

## 2021-10-29 RX ADMIN — CEFTRIAXONE SODIUM 1000 MG: 1 INJECTION, POWDER, FOR SOLUTION INTRAMUSCULAR; INTRAVENOUS at 23:48

## 2021-10-29 RX ADMIN — POTASSIUM CHLORIDE 10 MEQ: 1500 TABLET, EXTENDED RELEASE ORAL at 13:17

## 2021-10-29 RX ADMIN — IOPAMIDOL 80 ML: 755 INJECTION, SOLUTION INTRAVENOUS at 21:12

## 2021-10-29 RX ADMIN — HEPARIN SODIUM 3910 UNITS: 1000 INJECTION, SOLUTION INTRAVENOUS; SUBCUTANEOUS at 15:18

## 2021-10-29 RX ADMIN — HEPARIN SODIUM 18 UNITS/KG/HR: 10000 INJECTION, SOLUTION INTRAVENOUS at 09:49

## 2021-10-29 RX ADMIN — FENTANYL CITRATE 75 MCG/HR: 50 INJECTION INTRAMUSCULAR; INTRAVENOUS at 07:27

## 2021-10-29 RX ADMIN — ALBUTEROL SULFATE 2.5 MG: 2.5 SOLUTION RESPIRATORY (INHALATION) at 05:40

## 2021-10-29 RX ADMIN — HEPARIN SODIUM 1950 UNITS: 1000 INJECTION, SOLUTION INTRAVENOUS; SUBCUTANEOUS at 00:04

## 2021-10-29 RX ADMIN — HEPARIN SODIUM 1950 UNITS: 1000 INJECTION, SOLUTION INTRAVENOUS; SUBCUTANEOUS at 05:53

## 2021-10-29 RX ADMIN — BUMETANIDE 1 MG: 0.25 INJECTION, SOLUTION INTRAMUSCULAR; INTRAVENOUS at 13:17

## 2021-10-29 RX ADMIN — ALLOPURINOL 300 MG: 300 TABLET ORAL at 13:17

## 2021-10-29 RX ADMIN — SODIUM CHLORIDE, PRESERVATIVE FREE 10 ML: 5 INJECTION INTRAVENOUS at 13:18

## 2021-10-29 ASSESSMENT — PULMONARY FUNCTION TESTS
PIF_VALUE: 26
PIF_VALUE: 26
PIF_VALUE: 27
PIF_VALUE: 26

## 2021-10-29 NOTE — CONSULTS
[facility]    Patient's Name/Date of Birth: Cb Kenny / 1950 (57 y.o.)    Date: October 29, 2021     Referring Provider: Leilani Aguirre MD    CHIEF COMPLAINT: CHF      HPI: This is a pleasant 73 y.o. female presents with hx of PH with BARBER on Adempas, sacral wound stage 3 s/p wound-ostomy who presents with worsening hypoxia and sob. PH was 7.19 with CO 80, and SpO2 51%--she was intubated. Found to have ALVIN. Cr 1.4. Hgb 8.3. She missed a couple doses of her Adempas and Bumex. CXR shows diffuse infiltrates thoughout the lungs and possible LLL PNA. 10/27/21 she has 1100 cc thoracentesis. BP 100s. EF 60-65% with trace TR, and normal RV size and function. This AM, she went into Afib RVR. She was started on Amiodarone bolus and drip and is on Heparin gtt. She is actively being treated with Abx IV. Family provides all of history. Echo: Results for orders placed during the hospital encounter of 10/27/21    ECHO Complete 2D W Doppler W Color    Narrative  Transthoracic Echocardiography Report (TTE)    Demographics    Patient Name    Weston Blanc Gender               Female    MR #            [id number]     Race                     Ethnicity    Account #       [de-identified]     Room Number          0009    Accession       [id number]    Date of Study        10/28/2021  Number    Date of Birth   1950    Referring Physician  Edmundo Massey MD    Age             73 year(s)    Orvel Severance MD  Physician    Procedure    Type of Study    TTE procedure:ECHOCARDIOGRAM COMPLETE 2D W DOPPLER W COLOR. Procedure Date  Date: 10/28/2021 Start: 01:02 PM    Study Location: Bedside  Technical Quality: Limited visualization due to patient on ventilator. Indications:Evaluate pulmonary artery presures and Congestive heart failure.     Additional Medical History:Hypertension, pulmonary hypertension, chronic  kidney disease    Patient Status: Routine    Height: 57 inches Weight: 165 pounds BSA: 1.66 m^2 BMI: 35.71 kg/m^2    BP: 86/55 mmHg    Allergies  - No Known Allergies. Conclusions    Summary  Ejection fraction was estimated at 60-65%. The right ventricular size was normal with normal systolic function and  wall thickness. There was trace tricuspid regurgitation. IVC size is dilated with reduced respiratory phasic changes (CVP~10-15  mmHg). Signature    ----------------------------------------------------------------  Electronically signed by Claudetta Grebe MD (Interpreting  physician) on 10/28/2021 at 04:38 PM  ----------------------------------------------------------------    Findings    Mitral Valve  The mitral valve structure was normal with normal leaflet separation. DOPPLER: The transmitral velocity was within the normal range with no  evidence for mitral stenosis. There was no evidence of mitral  regurgitation. Aortic Valve  The aortic valve was trileaflet with normal thickness and cuspal  separation. DOPPLER: Transaortic velocity was within the normal range with  no evidence of aortic stenosis. There was no evidence of aortic  regurgitation. Tricuspid Valve  The tricuspid valve structure was normal with normal leaflet separation. DOPPLER: There was no evidence of tricuspid stenosis. There was trace  tricuspid regurgitation. Pulmonic Valve  The pulmonic valve leaflets exhibited normal thickness, no calcification,  and normal cuspal separation. DOPPLER: The transpulmonic velocity was  within the normal range with no evidence for regurgitation. Left Atrium  Left atrial size was normal.    Left Ventricle  Normal left ventricular size and systolic function. There were no regional wall motion abnormalities. Wall thickness was within normal limits. Ejection fraction was estimated at 60-65%.     Right Atrium  Right atrial size was normal.    Right Ventricle  The right ventricular size was normal with normal systolic function and  wall thickness. Pericardial Effusion  The pericardium was normal in appearance with a small, non-hemodynamically  significant pericardial effusion. Prominent pericardial fat pad. Pleural Effusion  No evidence of pleural effusion. Aorta / Great Vessels  IVC size is dilated with reduced respiratory phasic changes (CVP~10-15  mmHg).     M-Mode/2D Measurements & Calculations    LV Diastolic    LV Systolic Dimension: 3  AV Cusp Separation: 1.9 cmLA  Dimension: 4.5  cm                        Dimension: 3.1 cmAO Root  cm              LV Volume Diastolic: 37.3 Dimension: 2.9 cmLA Area: 14  LV FS:33.3 %    ml                        cm^2  LV PW           LV Volume Systolic: 27 ml  Diastolic: 1 cm LV EDV/LV EDV Index: 91.1  Septum          ml/55 m^2LV ESV/LV ESV  Diastolic: 1 cm Index: 27 JY/09 m^2       RV Diastolic Dimension: 3.2 cm  EF Calculated: 70.4 %  LA/Aorta: 1.07  Ascending Aorta: 2.7 cm  LA volume/Index: 32.1 ml /19m^2    Doppler Measurements & Calculations    MV Peak E-Wave:     AV Peak Velocity: 153 LVOT Peak Velocity: 110 cm/s  80.4 cm/s           cm/s                  LVOT Mean Velocity: 66.7 cm/s  MV Peak A-Wave:     AV Peak Gradient:     LVOT Peak Gradient: 5 mmHgLVOT  65.1 cm/s           9.36 mmHg             Mean Gradient: 2 mmHg  MV E/A Ratio: 1.24  AV Mean Velocity: 116  MV Peak Gradient:   cm/s                  TV Peak E-Wave: 50.9 cm/s  2.59 mmHg           AV Mean Gradient: 6   TV Peak A-Wave: 56.9 cm/s  mmHg  MV Deceleration     AV VTI: 30.5 cm       TV Peak Gradient: 1.04 mmHg  Time: 243 msec                            TR Velocity:276 cm/s  MV P1/2t: 71 msec                         TR Gradient:30.47 mmHg  MVA by PHT:3.1 cm^2 LVOT VTI: 19.7 cm     PV Peak Velocity: 90.8 cm/s  IVRT: 70 msec         PV Peak Gradient: 3.3 mmHg    AV DVI (VTI): 0.65AV  KS ED Velocity: 159 cm/s  DVI (Vmax):0.72    [id number]%[id number]%3d       All labs, EKG's, diagnostic testing and images as well as cardiac cath, stress testing were reviewed during this encounter    Past Medical History:   Diagnosis Date    CHF (congestive heart failure) ([facility] [address]     Dr. King Bernardo Depression     Gout attack     Hypertension     Osteoarthritis     Pulmonary artery hypertension ([facility] [address]     Marianne Vigil-- Dr. Holly Pham-- Dr. King Bernardo Renal calculi     Dr. Nick Dugan Thrombocytopenia [facility])      Past Surgical History:   Procedure Laterality Date    APPENDECTOMY  1960   [place] Right 8/6/2021    EXCISION RIGHT BUTTOCK WOUND AND CLOSURE performed [facility]ydin Evans [facility]     Current Facility-Administered Medications   Medication Dose Route Frequency Provider Last Rate Last Admin    metoprolol (LOPRESSOR) 5 MG/5ML injection             vancomycin (VANCOCIN) 1250 mg in dextrose 5 % 250 mL IVPB  1,250 mg IntraVENous Once Kodi Jackson MD        iron sucrose (VENOFER) 400 mg in sodium chloride 0.9 % 250 mL IVPB  400 mg IntraVENous Once Batsheva Paolas V, DO        fentaNYL (SUBLIMAZE) 1250 mcg in sodium chloride 0.9 % 250 mL  12.5-200 mcg/hr IntraVENous Continuous Zuleika Blue, DO 15 mL/hr at 10/29/21 0741 75 mcg/hr at 10/29/21 0741    vancomycin (VANCOCIN) intermittent dosing (placeholder)   Other RX Placeholder Cindy Trejo MD        0.9 % sodium chloride infusion  25 mL IntraVENous PRN Batsheva Guevaras V, DO        amiodarone (NEXTERONE) 360 mg in dextrose 5% 200 ml  0.5 mg/min IntraVENous Continuous Paloma Pavan V, DO 16.7 mL/hr at 10/29/21 0741 0.5 mg/min at 10/29/21 0741    pantoprazole (PROTONIX) injection 40 mg  40 mg IntraVENous Daily Paloma Pavan V, DO   40 mg at 10/28/21 1200    heparin 25,000 units in dextrose 5% 250 mL (premix) infusion  5-30 Units/kg/hr IntraVENous Continuous Paloma Pavan V, DO 13.5 mL/hr at 10/29/21 0949 18 Units/kg/hr at 10/29/21 0949    heparin (porcine) injection 3,910 Units  52 Units/kg IntraVENous PRN Paloma Pavan V, DO        Riociguat TABS 2.5 mg  2.5 mg Oral Q8H Paloma Pavan V, DO   2.5 mg at 10/29/21 0550    heparin (porcine) injection 1,950 Units  26 Units/kg IntraVENous PRN Donelda Chock V, DO   1,950 Units at 10/29/21 0553    sodium chloride flush 0.9 % injection 5-40 mL  5-40 mL IntraVENous 2 times per day Arna Sprain, DO   10 mL at 10/28/21 2333    sodium chloride flush 0.9 % injection 5-40 mL  5-40 mL IntraVENous PRN Arna Sprain, DO        norepinephrine (LEVOPHED) 16 mg in sodium chloride 0.9 % 250 mL infusion  2-100 mcg/min IntraVENous Continuous Cindy Trejo MD 19.7 mL/hr at 10/29/21 0855 21 mcg/min at 10/29/21 0855    allopurinol (ZYLOPRIM) tablet 300 mg  300 mg Oral Daily Cindy Trejo MD   300 mg at 10/28/21 0949    aspirin EC tablet 81 mg  81 mg Oral Daily Cindy Trejo MD   81 mg at 10/28/21 0949    [Held by provider] FLUoxetine (PROZAC) capsule 40 mg  40 mg Oral Daily Cinyd Trejo MD        [Held by provider] metoprolol tartrate (LOPRESSOR) tablet 12.5 mg  12.5 mg Oral BID Cindy Trejo MD        [Held by provider] bumetanide (BUMEX) injection 1 mg  1 mg IntraVENous Daily Cindy Trejo MD        albuterol (PROVENTIL) nebulizer solution 2.5 mg  2.5 mg Nebulization Q6H Cindy Trejo MD   2.5 mg at 10/29/21 0540    0.9 % sodium chloride infusion   IntraVENous Continuous Cindy rTejo MD 50 mL/hr at 10/28/21 2314 New Bag at 10/28/21 2314    cefTRIAXone (ROCEPHIN) 1000 mg IVPB in 50 mL D5W minibag  1,000 mg IntraVENous Q24H Cindy Trejo MD   Stopped at 10/29/21 0003    midazolam (VERSED) 100 mg in dextrose 5 % 100 mL infusion  1-10 mg/hr IntraVENous Continuous Kirsten Bernheim, MD   Stopped at 10/28/21 1657     Prior to Admission medications    Medication Sig Start Date End Date Taking? Authorizing Provider   metoprolol tartrate (LOPRESSOR) 25 MG tablet Take 0.5 tablets by mouth 2 times daily 10/25/21  Yes Aguilar LuiLANE - CNP   sodium hypochlorite (DAKINS) 0.125 % SOLN external solution Apply Dakin's moistened gauze dressings to wound twice daily and as needed.  8/24/21  Yes LANE [place] - CNP   sodium chloride 1 g tablet Take 1 tablet by mouth 2 times daily 8/3/21  Yes Italo Berry MD   allopurinol (ZYLOPRIM) 300 MG tablet Take 1 tablet by mouth daily 7/16/21  Yes Nereida Lamb MD   FLUoxetine (PROZAC) 40 MG capsule Take 1 capsule by mouth daily 7/16/21  Yes Nereida Lamb MD   potassium chloride (KLOR-CON M) 10 MEQ extended release tablet Take 1 tablet by mouth every other day 5/16/21  Yes LANE [place]arcon CNP   bumetanide (BUMEX) 1 MG tablet Take 1 tablet by mouth daily 5/16/21  Yes LANE [place]arcon - CNP   Multiple Vitamins-Minerals (MULTIVITAMIN WOMEN PO) Take 1 tablet by mouth daily   Yes Historical Provider, MD   acetaminophen (TYLENOL) 650 MG extended release tablet Take 650 mg by mouth every 8 hours as needed for Pain   Yes Historical Provider, MD   Riociguat (ADEMPAS) 2.5 MG TABS Take 2.5 mg by mouth 3 times daily   Yes Historical Provider, MD   docusate sodium (COLACE) 100 MG capsule Take 100 mg by mouth as needed    Yes Historical Provider, MD   aspirin 81 MG tablet Take 81 mg by mouth daily    Yes Historical Provider, MD   Scheduled Meds:   metoprolol        vancomycin  1,250 mg IntraVENous Once    iron sucrose  400 mg IntraVENous Once    vancomycin (VANCOCIN) intermittent dosing (placeholder)   Other RX Placeholder    pantoprazole  40 mg IntraVENous Daily    Riociguat  2.5 mg Oral Q8H    sodium chloride flush  5-40 mL IntraVENous 2 times per day    allopurinol  300 mg Oral Daily    aspirin  81 mg Oral Daily    [Held by provider] FLUoxetine  40 mg Oral Daily    [Held by provider] metoprolol tartrate  12.5 mg Oral BID    [Held by provider] bumetanide  1 mg IntraVENous Daily    albuterol  2.5 mg Nebulization Q6H    cefTRIAXone (ROCEPHIN) IV  1,000 mg IntraVENous Q24H     Continuous Infusions:   fentaNYL (SUBLIMAZE) 1250 mcg in sodium chloride 0.9 % 250 mL 75 mcg/hr (10/29/21 0741)    sodium chloride      amiodarone 0.5 mg/min (10/29/21 0741)    heparin (PORCINE) Infusion 18 Units/kg/hr (10/29/21 0949)    norepinephrine 21 mcg/min (10/29/21 0855)    sodium chloride 50 mL/hr at 10/28/21 2314    midazolam Stopped (10/28/21 8437)     PRN Meds:.sodium chloride, heparin (porcine), heparin (porcine), sodium chloride flush    No Known Allergies  Family History   Problem Relation Age of Onset    Diabetes Father    July Rice Arthritis Mother    July Rice COPD Mother     Diabetes Sister     Heart Disease Maternal Uncle     Breast Cancer Niece 36    Sleep Apnea Brother     Asthma Neg Hx     Birth Defects Neg Hx     Cancer Neg Hx     Depression Neg Hx     Early Death Neg Hx     Hearing Loss Neg Hx     High Blood Pressure Neg Hx     High Cholesterol Neg Hx     Kidney Disease Neg Hx     Learning Disabilities Neg Hx     Mental Illness Neg Hx     Mental Retardation Neg Hx     Miscarriages / Stillbirths Neg Hx     Stroke Neg Hx     Substance Abuse Neg Hx     Vision Loss Neg Hx     Other Neg Hx      Social History     Socioeconomic History    Marital status: Single     Spouse name: Not on file    Number of children: 0    Years of education: Not on file    Highest education level: Not on file   Occupational History    Not on file   Tobacco Use    Smoking status: Never Smoker    Smokeless tobacco: Never Used   Vaping Use    Vaping Use: Never used   Substance and Sexual Activity    Alcohol use: No    Drug use: No    Sexual activity: Not Currently   Other Topics Concern    Not on file   Social History Narrative    Not on file     Social Determinants of Health     Financial Resource Strain: Low Risk  Difficulty of Paying Living Expenses: Not very hard   Food Insecurity: No Food Insecurity    Worried About Running Out of Food in the Last Year: Never true    Ran Out of Food in the Last Year: Never true   Transportation Needs:     Lack of Transportation (Medical):  Lack of Transportation (Non-Medical):    Physical Activity:     Days of Exercise per Week:     Minutes of Exercise per Session:    Stress:     Feeling of Stress :    Social Connections:     Frequency of Communication with Friends and Family:     Frequency of Social Gatherings with Friends and Family:     Attends Islam Services:     Active Member of Clubs or Organizations:     Attends Club or Organization Meetings:     Marital Status:    Intimate Partner Violence:     Fear of Current or Ex-Partner:     Emotionally Abused:     Physically Abused:     Sexually Abused:      ROS:   Unable to obtain    Labs:  CBC:   Recent Labs     10/27/21  1350 10/28/21  0423 10/29/21  0435   WBC 10.7 10.4 13.0*   HGB 9.6* 8.5* 8.3*   HCT 33.6* 30.3* 28.7*   MCV 85.9 88.1 85.9    267 266     BMP:   Recent Labs     10/27/21  1350 10/27/21  2042 10/28/21  0105 10/28/21  0423 10/29/21  0435     --   --  142 140   K 5.3*  --   --  4.7 4.4     --   --  108 105   CO2 25  --   --  26 21*   PHOS  --   --   --  4.1 3.5   BUN 31*  --   --  31* 28*   CREATININE 1.5*  --   --  1.5* 1.4*   MG  --    < > 2.0 1.9 1.7    < > = values in this interval not displayed. Accucheck Glucoses: No results for input(s): POCGLU in the last 72 hours. Cardiac Enzymes: No results for input(s): CKTOTAL, CKMB, CKMBINDEX, TROPONINI in the last 72 hours. PT/INR:   Recent Labs     10/27/21  1421 10/28/21  1600   INR 1.13 1.24*     APTT: No results for input(s): APTT in the last 72 hours.   Liver Profile:  Lab Results   Component Value Date    AST 17 10/29/2021    ALT 9 10/29/2021    BILIDIR 0.3 03/06/2020    BILITOT <0.2 10/29/2021    ALKPHOS 103 10/29/2021 gtt  10. Abx per primary  11. Further recommendations based on results and clinical course    Thank you for allowing us to participate in the care of this patient. Please do not hesitate to call us with questions. Greater than 60 minutes of time was spent managing critical issues, discussing with the family, and coordinating care.         Electronically signed by Yoli Pedroza MD on 10/29/2021 at 10:40 AM    Interventional Cardiology - [facility]

## 2021-10-29 NOTE — PRE SEDATION
[address] [address]  Sedation/Analgesia History & Physical    Pt Name: Mora Tran  Account number: [de-identified]  MRN: [id number]  YOB: 1950  Provider Performing Procedure: Ifeanyi Jones MD MD  Referring Provider: Christiana oJshua MD   Primary Care Physician: Celsa Riddle MD  Date: 10/29/2021    PRE-PROCEDURE    Code Status: FULL CODE  Brief History/Pre-Procedure Diagnosis:   Pulmonary hypertension   CHF  Acute respiratory failure    H/o HTN, now with borderline/low BP     Consent: : I have discussed with the patient risks, benefits, and alternatives (and relevant risks, benefits, and side effects related to alternatives or not receiving care), and likelihood of the success. The patient and/or representative appear to understand and agree to proceed. The discussion encompasses risks, benefits, and side effects related to the alternatives and the risks related to not receiving the proposed care, treatment, and services. The indication, risks and benefits of the procedure and possible therapeutic consequences and alternatives were discussed with the patient. The patient was given the opportunity to ask questions and to have them answered to his/her satisfaction. Risks of the procedure include but are not limited to the following: Bleeding, hematoma including retroperitoneal hemmorhage, infection, pain and discomfort, injury to the aorta and other blood vessels, rhythm disturbance, low blood pressure, myocardial infarction, stroke, kidney damage/failure, myocardial perforation, allergic reactions to sedatives/contrast material, loss of pulse/vascular compromise requiring surgery, aneurysm/pseudoaneurysm formation, possible loss of a limb/hand/leg, needing blood transfusion, requiring emergent open heart surgery or emergent coronary intervention, the need for intubation/respiratory support, the requirement for defibrillation/cardioversion, and death.  Alternatives to and omission of the suggested procedure were discussed. The patient had no further questions and wished to proceed; the consent form was signed. MEDICAL HISTORY  []ASHD/ANGINA/MI/CHF   []Hypertension  []Diabetes  []Hyperlipidemia  []Smoking  []Family Hx of ASHD  []Additional information:       has a past medical history of CHF (congestive heart failure) ([facility] [address] Depression, Gout attack, Hypertension, Osteoarthritis, Pulmonary artery hypertension ([facility] [address] Renal calculi, and Thrombocytopenia ([facility] [address] SURGICAL HISTORY   has a past surgical history that includes Appendectomy (); Facial nerve surgery; and Pressure ulcer debridement (Right, 2021).   Additional information:       ALLERGIES   Allergies as of 10/27/2021    (No Known Allergies)     Additional information:       MEDICATIONS   Aspirin  [] 81 mg  [] 325 mg  [] None  Antiplatelet drug therapy use last 5 days  []No []Yes  Coumadin Use Last 5 Days []No []Yes  Other anticoagulant use last 5 days  []No []Yes    Current Facility-Administered Medications:     metoprolol (LOPRESSOR) 5 MG/5ML injection, , , ,     vancomycin (VANCOCIN) 1250 mg in dextrose 5 % 250 mL IVPB, 1,250 mg, IntraVENous, Once, Osmin Phillips MD    iron sucrose (VENOFER) 400 mg in sodium chloride 0.9 % 250 mL IVPB, 400 mg, IntraVENous, Once, Paloma Browne V, DO    fentaNYL (SUBLIMAZE) 1250 mcg in sodium chloride 0.9 % 250 mL, 12.5-200 mcg/hr, IntraVENous, Continuous, Chika Reid DO, Last Rate: 15 mL/hr at 10/29/21 0741, 75 mcg/hr at 10/29/21 0741    vancomycin (VANCOCIN) intermittent dosing (placeholder), , Other, RX Placeholder, Cindy Trejo MD    0.9 % sodium chloride infusion, 25 mL, IntraVENous, PRN, Paloma Pavan V, DO    [COMPLETED] amiodarone (NEXTERONE) 150 mg in dextrose 5% 100 ml, 150 mg, IntraVENous, Once, Stopped at 10/28/21 0829 **FOLLOWED BY** [] amiodarone (NEXTERONE) 360 mg in dextrose 5% 200 ml, 1 mg/min, IntraVENous, Continuous, Last Rate: 33.3 mL/hr at 10/28/21 0822, 1 mg/min at 10/28/21 0822 **FOLLOWED BY** amiodarone (NEXTERONE) 360 mg in dextrose 5% 200 ml, 0.5 mg/min, IntraVENous, Continuous, Paloma Pavan V, DO, Last Rate: 16.7 mL/hr at 10/29/21 0741, 0.5 mg/min at 10/29/21 0741    pantoprazole (PROTONIX) injection 40 mg, 40 mg, IntraVENous, Daily, Paloma Pavan V, DO, 40 mg at 10/28/21 1200    heparin 25,000 units in dextrose 5% 250 mL (premix) infusion, 5-30 Units/kg/hr, IntraVENous, Continuous, Paloma Pavan V, DO, Last Rate: 13.5 mL/hr at 10/29/21 0949, 18 Units/kg/hr at 10/29/21 0949    heparin (porcine) injection 3,910 Units, 52 Units/kg, IntraVENous, PRN, Paloma Pavan V, DO    Riociguat TABS 2.5 mg, 2.5 mg, Oral, Q8H, Paloma Pavan V, DO, 2.5 mg at 10/29/21 0550    heparin (porcine) injection 1,950 Units, 26 Units/kg, IntraVENous, PRN, Paloma Pavan V, DO, 1,950 Units at 10/29/21 0553    sodium chloride flush 0.9 % injection 5-40 mL, 5-40 mL, IntraVENous, 2 times per day, Michela Has, DO, 10 mL at 10/28/21 2333    sodium chloride flush 0.9 % injection 5-40 mL, 5-40 mL, IntraVENous, PRN, Michela Has, DO    norepinephrine (LEVOPHED) 16 mg in sodium chloride 0.9 % 250 mL infusion, 2-100 mcg/min, IntraVENous, Continuous, Cindy Trejo MD, Last Rate: 19.7 mL/hr at 10/29/21 0855, 21 mcg/min at 10/29/21 0855    allopurinol (ZYLOPRIM) tablet 300 mg, 300 mg, Oral, Daily, Cindy Trejo MD, 300 mg at 10/28/21 0949    aspirin EC tablet 81 mg, 81 mg, Oral, Daily, Cindy Trejo MD, 81 mg at 10/28/21 0949    [Held by provider] FLUoxetine (PROZAC) capsule 40 mg, 40 mg, Oral, Daily, Devin Hagen MD    [Held by provider] metoprolol tartrate (LOPRESSOR) tablet 12.5 mg, 12.5 mg, Oral, BID, Devin Hagen MD    [Held by provider] bumetanide (BUMEX) injection 1 mg, 1 mg, IntraVENous, Daily, Cindy Trejo MD    albuterol (PROVENTIL) nebulizer solution 2.5 mg, 2.5 mg, Nebulization, Q6H, Cindy Trejo MD, 2.5 mg at 10/29/21 0518    0.9 % sodium chloride infusion, , IntraVENous, Continuous, Cindy Trejo MD, Last Rate: 50 mL/hr at 10/28/21 2314, New Bag at 10/28/21 2314    cefTRIAXone (ROCEPHIN) 1000 mg IVPB in 50 mL D5W minibag, 1,000 mg, IntraVENous, Q24H, Cindy Trejo MD, Stopped at 10/29/21 0003    midazolam (VERSED) 100 mg in dextrose 5 % 100 mL infusion, 1-10 mg/hr, IntraVENous, Continuous, Cindy Trejo MD, Stopped at 10/28/21 1657  Prior to Admission medications    Medication Sig Start Date End Date Taking? Authorizing Provider   metoprolol tartrate (LOPRESSOR) 25 MG tablet Take 0.5 tablets by mouth 2 times daily 10/25/21  Yes LANE Hill CNP   sodium hypochlorite (DAKINS) 0.125 % SOLN external solution Apply Dakin's moistened gauze dressings to wound twice daily and as needed.  8/24/21  Yes LANE Lindo CNP   sodium chloride 1 g tablet Take 1 tablet by mouth 2 times daily 8/3/21  Yes Italo Berry MD   allopurinol (ZYLOPRIM) 300 MG tablet Take 1 tablet by mouth daily 7/16/21  Yes Nereida Lamb MD   FLUoxetine (PROZAC) 40 MG capsule Take 1 capsule by mouth daily 7/16/21  Yes Nereida Lamb MD   potassium chloride (KLOR-CON M) 10 MEQ extended release tablet Take 1 tablet by mouth every other day 5/16/21  Yes LANE Alarcon CNP   bumetanide (BUMEX) 1 MG tablet Take 1 tablet by mouth daily 5/16/21  Yes LANE Alarcon CNP   Multiple Vitamins-Minerals (MULTIVITAMIN WOMEN PO) Take 1 tablet by mouth daily   Yes Historical Provider, MD   acetaminophen (TYLENOL) 650 MG extended release tablet Take 650 mg by mouth every 8 hours as needed for Pain   Yes Historical Provider, MD   Riociguat (ADEMPAS) 2.5 MG TABS Take 2.5 mg by mouth 3 times daily   Yes Historical Provider, MD   docusate sodium (COLACE) 100 MG capsule Take 100 mg by mouth as needed    Yes Historical Provider, MD   aspirin 81 MG tablet Take 81 mg by mouth daily    Yes Historical Provider, MD     Additional information:       VITAL SIGNS   Vitals:    10/29/21 0932   BP:    Pulse: 76   Resp: 16   Temp:    SpO2: 92%       PHYSICAL:   General: sedated  HEENT:  intubated  Heart: RRR  NYHA: IV   Mental Status: Sedated    PLANNED PROCEDURE   []Cath  []PCI                []Pacemaker/AICD  []TOBIAS             []Cardioversion []Peripheral angiography/PTA  [x]Other: RHC, Darlene Massy cath placement   Arterial line     SEDATION  Planned agent:[x]Midazolam []Meperidine []Sublimaze []Morphine  []Diazepam  []Other:     ASA Classification:  []1 []2 []3 [x]4 []5   Class 1: A normal healthy patient  Class 2: Pt with mild to moderate systemic disease  Class 3: Severe systemic disease or disturbance  Class 4: Severe systemic disorders that are already life threatening. Class 5: Moribund pt with little chances of survival, for more than 24 hours. Mallampati I Airway Classification:   []1 []2 []3 [x]4     [x]Pre-procedure diagnostic studies complete and results available. Comment:    [x]Previous sedation/anesthesia experiences assessed. Comment:    [x]The patient is an appropriate candidate to undergo the planned procedure sedation and anesthesia. (Refer to nursing sedation/analgesia documentation record)  [x]Formulation and discussion of sedation/procedure plan, risks, and expectations with patient and/or responsible adult completed. [x]Patient examined immediately prior to the procedure.  (Refer to nursing sedation/analgesia documentation record)      Huyen Gutierrez MD, Aleyda Jin    Electronically signed 10/29/2021 at 10:01 AM

## 2021-10-29 NOTE — PROGRESS NOTES
0740-In to assess patient. Patient RASS is (-1) and will wake up and follow commands. Currently on Heparin gtt, Amiodarone gtt, Levophed gtt, Fentanyl gtt and IVF. Vitals:    10/29/21 0600 10/29/21 0700 10/29/21 0731 10/29/21 0745   BP: (!) 122/57 108/69 122/63 (!) 108/58   Pulse: 87 148 118 138   Resp: 14 16 18 16   Temp:   98.6 °F (37 °C)    TempSrc:   Bladder    SpO2: 90% 95% 96% 93%   Weight:       Height:         ECG rhythm appears to be A-fib with RVR. Plan is to go to cathlab today TOBIAS and cardioversion. 0900-Converted to normal sinus rhythm. Rate 72. EKG to confirm. 1008-Patient left unit for Cath lab. 1145-Patient returned to unit from Cath lab. Returned with LIJ sheath with [place] and right radial A-line. Patient very lethargic at this time. RASS -3.      1700-Orders for STAT CT of the chest to assess effusions. Unable to take patient down at this time d/t RT being tied up with an acute intubation. Will take patient down when RT available.

## 2021-10-29 NOTE — CARE COORDINATION
10/29/21, 4:31 PM EDT    DISCHARGE ON GOING EVALUATION    [facility] day: 2  Location: -09/009-A Reason for admit: Acute respiratory failure ([address] [J96.00]  Flash pulmonary edema ([address] [J81.0]  Acute respiratory failure with hypercapnia ([facility] [address] [J96.02]   Procedure:   10/27 CXR: Near complete opacification of the left hemithorax with very little aerated lung visualized in the left upper lobe. Small right pleural effusion and right lower lobe consolidation obscures visualization of the right hemidiaphragm pulmonary vascular congestion is observed  10/27 Intubated  10/27 CVC right subclavian  10/27 IR: Left thoracentesis with 1.1L removed  10/28 EEG: no definitive evidence of epileptiform activity appreciated. 10/29 Cardiac Cath with [facility] and michelle placed  10/29 CXR: Diffuse infiltrates throughout the lungs bilaterally with small bilateral pleural effusions. Somewhat worse appearance of the chest when compared to the prior study    Barriers to Discharge: In and out of RVR last night and early this morning; additional lopressor given this morning and patient converted to NSR. Went to cath lab and had SGC and michelle placed. CT chest ordered. Remains on vent w/ETT on SBT, FIO2 40%, sats 95%. Tmax 99. NSR. Unable to follow commands; tremors x4. Respiratory culture +MRSA and adenovirus by PCR. Telemetry, CVC, OG w/TF, seaman, SCDs. Amio @ 0.5 mg/min, fentanyl @ 75 mcg/hr, heparin drip, levo @ 11 mcg/min, nebs, allopurinol, asa, IV bumex 1 mg bid, IV rocephin, IV decadron, IV protonix, K+, riociguat, IV vancomycin. Received IV venofer x1 today. BUN 28, Creat 1.4, alb 2.5, wbc 13, hgb 8.3, iron 11, tibc 161, transferrin 139. PCP: Huey Chatman MD  Readmission Risk Score: 17.7%  Patient Goals/Plan/Treatment Preferences: From home alone and current with [facility]. DOT on case. Monitor for possible needs. Will need PT/OT when appropriate. Per Cardiology, may need transfer to [facility] to [address] [address] service.

## 2021-10-29 NOTE — PROCEDURES
[address] [place] [address]                            CARDIAC CATHETERIZATION    PATIENT NAME: Yoni PENALOZA                      :        1950  MED REC NO:   [id number]                           ROOM:       [id number]  ACCOUNT NO:   [de-identified]                           ADMIT DATE: 10/27/2021  PROVIDER:     Hayder Mulligan MD    DATE OF PROCEDURE:  10/29/2021    INDICATION:  1. Acute hypoxic respiratory failure. 2.  Known history of pulmonary arterial hypertension. 3.  Acute congestive heart failure with preserved ejection fraction. 4.  Hypoxia. PROCEDURES PERFORMED:  1. Right heart catheterization. 2.  Placement of [place]asia catheter. 3.  Placement of right radial arterial line. DESCRIPTION OF THE PROCEDURE:  After informed consent, the patient was  brought to the cardiac catheterization room. She was prepped and draped  in a sterile fashion. 2% lidocaine was injected in the skin and  subcutaneous tissue overlying the right radial artery. Under ultrasound  guidance using modified Seldinger technique, I obtained access in the  right radial artery. A 4-[place] sheath was inserted and flushed with  normal saline then was connected to the transducer. Sheath was secured  in place. I used modified Seldinger technique under ultrasound guidance  to obtain access in the left internal jugular vein. A 9-[place] sheath  was inserted. [place]-Kasia catheter was used to complete right heart  catheterization. [place] catheter was left in place. The sheath in  the left IJ was secured. FINDINGS:  RIGHT HEART CATHETERIZATION/HEMODYNAMICS:  Pulmonary arterial oxygen  saturation 84%. Right arterial pressure 20 mmHg. Right ventricular  pressure 81/10. Pulmonary arterial pressure 82/31. Mean pulmonary  arterial pressure 50 mmHg. Pulmonary capillary wedge pressure 27 mmHg. Cardiac output 11.5 liters/minute. Cardiac index 6.9. Transpulmonary  gradient 23 mmHg. Pulmonary vascular resistance 3.2 Wood units. POSTPROCEDURE DIAGNOSES:  1.  Status post successful right heart catheterization with placement of  [place]-Kasia catheter, right radial artery line placement. 2.  Severe mixed venous and arterial pulmonary hypertension. 3.  Elevated right and left-sided filling pressures. 4.  Volume overload. 5.  Acute-on-chronic congestive heart failure with preserved ejection  fraction. RECOMMENDATIONS:  The patient is being monitored and treated in ICU for  respiratory failure and pneumonia. Direct current cardioversion was not  required as the patient converted from atrial fibrillation to sinus  rhythm. She was on heparin drip, was held for procedure. May resume in  2 hours post procedure. She has no history of pulmonary arterial  hypertension. Continue on riociguat. Hemodynamics are consistent with  pulmonary venous hypertension component and decompensated heart failure. Stop IV fluids. Start Bumex 1 mg IV b.i.d. Replace the electrolytes as  needed. Monitor daily weight and intake and output. Transduced right  radial 4-French sheath, remove with subsequent Vasc Band appliance to  achieve hemostasis once blood pressure is more stable and invasive  arterial blood pressure monitoring is not required. Findings and plan  of care were discussed with patient's family.         Fernando So MD    D: 10/29/2021 12:04:11       T: 10/29/2021 13:23:07     [email or web address]  Job#: [id number]     Doc#: [id number]    CC:

## 2021-10-29 NOTE — PROGRESS NOTES
CRITICAL CARE PROGRESS NOTE      Patient:  Beata Wu    Unit/Bed:4D-09/009-A  YOB: 1950  MRN: [id number]   PCP: Curtis Lamas MD  Date of Admission: 10/27/2021  Chief Complaint:- SOB    Assessment and Plan:    1. Acute hypercapnic hypoxic respiratory failure: Likely secondary to obstructive sleep apnea, CPAP noncompliant. ABG on admission show pH 7.19, PCO2 80, PO2 40. Repeat ABG after intubation showed pH 7.39, PCO2 42, PO2 84. Plan: Lung protective strategy, cont vent support. 10/29: Current vent settings pressure control 20 PEEP 6, FiO2 35, tidal volume 426, respiration rate 16     2. Pneumonia versus atelectasis: Chest x-ray on October 28 show persistent left lower lobe atelectasis versus consolidation. Procal 0.24, Lactic acid 0.8. Pneumonia panel (10/28): positive MRSA, Adenovirus. Plan: Vancomycin day 2 (started 10/28)     3. Exudate Left pleural effusion: Improved. S/p thoracocentesis (10/27) with 1.1L purulent fluid drainage. LDH resume LDH pleural/serum 114/134. Cell count no evidence of malignant cell. CXR (10/29): Diffuse infiltrate to both lung bilateral with small bilateral pleural effusion. Worsening appearance compared to chest x-ray 10/28  10/29: Ct chest, thoracocentesis if pleural effusion is significant    4. HFpEF: NYHA class II. Pulse likely secondary to pulmonary hypertension, hypertension echo with May 2021 show LVEF 55 to 60%, no regional left wall motion abnormality, no abnormality wall thickness, grade 2 diastolic dysfunction. BNP on admission 883. Patient had been seen by Dr. Alan Crespo cardiologist.  Last office visit in October 2021. Plan: Hold metoprolol dur to hypotension     4. Atrial fib without RVR: [id number] 4. Tele show A fib with -140s. Plan: EKG, Troponin, tele monitor, echo, Amiodarone 150 bolus then drip, Lovenox 80 mg BID  10/29: Cardiologist consult appreciated. Plan: cath lab today     5.  Iron deficiency anemia:  Secondary to iron deficiency anemia. Hg 9.6 with patient Hg base line 9.3. MCV 85.9. Plan: Iron replacement     6. ALVIN on CKD stage III: BUN/Cr 31/1.5 Patient baseline BUN/Cr 13/1. Plan: Hold Bumex, NS 50 ml/hrs, bolus 500ml NS     7. Pulmonary hypertension: Can have been signed and documented obstructive sleep apnea in 2019, hadn't follow-up with pulmonologist over last 2 years. Patient did not use CPAP at night. Plan: CPAP after extubation     8. Obstructive sleep apnea: CPAP noncompliant. Plan: cont CPAP on extubation     9. Hyperkalemia: Resolve. K 5.3--> 4.7. Plan cont monitor  10. Troponin elevation  11. Sacral wound stage III: SP wound ostomy in September 2021. Plan: Blood culture, abx (see 2), turn patient every 2 hours   12. Gout: Plan continue albuterol  12. DVT prophy: Lovenox  13: PUD propjhy: Pepcid    INITIAL H AND P AND ICU COURSE:  Lino Poser is 73years old female who presented to the hospital with shortness of breath. Past medical history significant with CHF, pulmonary hypertension, sacral wound stage III - s/p surgical wound ostomy (9/2021). Patient sister reports that patient home O2 Sat 51% thus family deiced to take her to the hospital.  In the emergency room, patient ABG show acute hypoxic hypercapnic respiratory failure with pH 7.19, PCO2 80, PO2 134. Patient was intubated. Repeat ABG showed pH 7.39, PCO2 42, PO2 87. Bumex was held due to ALVIN. On 10/28, patient was transfer into ICU. During ICU course, patient telemetry show new onset A. fib. Patient was received amiodarone 150 mL bolus and drip. Cardiologist was consult, Cath Lab will planning on October 29.     Past Medical History      Diagnosis Date    CHF (congestive heart failure) ([facility])     Dr. Vivien Mcclelland Depression     Gout attack     Hypertension     Osteoarthritis     Pulmonary artery hypertension ([facility] [address]     [facility]-- Dr. Erlin Subramanian-- Dr. Vivien Mcclelland Renal calculi     Dr. Jorge [facility])         Past Surgical History Procedure Laterality Date    APPENDECTOMY  1960    FACIAL NERVE SURGERY      1989    PRESSURE ULCER DEBRIDEMENT Right 8/6/2021    EXCISION RIGHT BUTTOCK WOUND AND CLOSURE performed by Blayne Rosas MD at [facility]       Current Medications    metoprolol  5 mg IntraVENous Q6H    metoprolol        vancomycin (VANCOCIN) intermittent dosing (placeholder)   Other RX Placeholder    pantoprazole  40 mg IntraVENous Daily    Riociguat  2.5 mg Oral Q8H    sodium chloride flush  5-40 mL IntraVENous 2 times per day    allopurinol  300 mg Oral Daily    aspirin  81 mg Oral Daily    [Held by provider] FLUoxetine  40 mg Oral Daily    [Held by provider] metoprolol tartrate  12.5 mg Oral BID    [Held by provider] bumetanide  1 mg IntraVENous Daily    albuterol  2.5 mg Nebulization Q6H    cefTRIAXone (ROCEPHIN) IV  1,000 mg IntraVENous Q24H     sodium chloride, heparin (porcine), heparin (porcine), sodium chloride flush    IV Drips/Infusions   fentaNYL (SUBLIMAZE) 1250 mcg in sodium chloride 0.9 % 250 mL 75 mcg/hr (10/28/21 1947)    sodium chloride      amiodarone 0.5 mg/min (10/29/21 0252)    heparin (PORCINE) Infusion 18 Units/kg/hr (10/29/21 9114)    norepinephrine 25 mcg/min (10/29/21 0245)    sodium chloride 50 mL/hr at 10/28/21 2314    midazolam Stopped (10/28/21 1077)       Home Medications  Medications Prior to Admission: metoprolol tartrate (LOPRESSOR) 25 MG tablet, Take 0.5 tablets by mouth 2 times daily  sodium hypochlorite (DAKINS) 0.125 % SOLN external solution, Apply Dakin's moistened gauze dressings to wound twice daily and as needed.   sodium chloride 1 g tablet, Take 1 tablet by mouth 2 times daily  allopurinol (ZYLOPRIM) 300 MG tablet, Take 1 tablet by mouth daily  FLUoxetine (PROZAC) 40 MG capsule, Take 1 capsule by mouth daily  potassium chloride (KLOR-CON M) 10 MEQ extended release tablet, Take 1 tablet by mouth every other day  bumetanide (BUMEX) 1 MG tablet, Take 1 tablet by mouth daily  Multiple Vitamins-Minerals (MULTIVITAMIN WOMEN PO), Take 1 tablet by mouth daily  acetaminophen (TYLENOL) 650 MG extended release tablet, Take 650 mg by mouth every 8 hours as needed for Pain  Riociguat (ADEMPAS) 2.5 MG TABS, Take 2.5 mg by mouth 3 times daily  docusate sodium (COLACE) 100 MG capsule, Take 100 mg by mouth as needed   aspirin 81 MG tablet, Take 81 mg by mouth daily   ALLERGIES: Patient has no known allergies. ROS   Can't obtain due to patient sedative on vent    Scheduled Meds:   metoprolol  5 mg IntraVENous Q6H    metoprolol        vancomycin (VANCOCIN) intermittent dosing (placeholder)   Other RX Placeholder    pantoprazole  40 mg IntraVENous Daily    Riociguat  2.5 mg Oral Q8H    sodium chloride flush  5-40 mL IntraVENous 2 times per day    allopurinol  300 mg Oral Daily    aspirin  81 mg Oral Daily    [Held by provider] FLUoxetine  40 mg Oral Daily    [Held by provider] metoprolol tartrate  12.5 mg Oral BID    [Held by provider] bumetanide  1 mg IntraVENous Daily    albuterol  2.5 mg Nebulization Q6H    cefTRIAXone (ROCEPHIN) IV  1,000 mg IntraVENous Q24H     Continuous Infusions:   fentaNYL (SUBLIMAZE) 1250 mcg in sodium chloride 0.9 % 250 mL 75 mcg/hr (10/28/21 1947)    sodium chloride      amiodarone 0.5 mg/min (10/29/21 0252)    heparin (PORCINE) Infusion 18 Units/kg/hr (10/29/21 0553)    norepinephrine 25 mcg/min (10/29/21 0245)    sodium chloride 50 mL/hr at 10/28/21 2314    midazolam Stopped (10/28/21 4797)       PHYSICAL EXAMINATION:  T:  98.6. P:  76. RR:  16. B/P: 107/51. FiO2: 30 . O2 Sat:  92.  I/O: +6.3 L  Body mass index is 35.83 kg/m². GCS:  10  General:  Sedated on vent. No ventilation desynchronization  HEENT:  normocephalic and atraumatic. No scleral icterus. PERR  Neck: supple. No Thyromegaly. Lungs: clear to auscultation. No retractions  Cardiac: RRR. No JVD. Abdomen: soft. Nontender. Extremities:  No clubbing, cyanosis, or edema x 4. Vasculature: capillary refill < 3 seconds. Palpable dorsalis pedis pulses. Skin:  warm and dry. Psych:  Alert and oriented x3. Affect appropriate  Lymph:  No supraclavicular adenopathy. Neurologic:  No focal deficit. No seizures. Data: (All radiographs, tracings, PFTs, and imaging are personally viewed and interpreted unless otherwise noted). · Sodium 140, potassium 4.4, chloride 105, bicarb 21, BUN/Cr 28/1.4  · WBC 13, hemoglobin 8.3, hematocrit 28.7, platelets 022  · Telemetry shows normal sinus rhythm  · Pneumonia panel (10/27) positive for MRSA, adenovirus - Vancomycin  · CXR (10/29): Diffuse infiltrates throughout the lungs bilaterally with small bilateral pleural effusions. Somewhat worse appearance of the chest when compared to the prior study.           Seen with multidisciplinary ICU team.  Meets Continued ICU Level Care Criteria:    [x] Yes   [] No - Transfer Planned to listed location:  [] HOSPITALIST CONTACTED- DR       Electronically signed by Fredrick Graves DO  Internal Medicine Resident Physician

## 2021-10-29 NOTE — PROGRESS NOTES
Pharmacy Vancomycin Consult     Vancomycin Day: 2  Current Dosing: Intermittent dosing   Current indication: Pneumonia    Temp max:  afebrile    Recent Labs     10/28/21  0423 10/29/21  0435   BUN 31* 28*   CREATININE 1.5* 1.4*   WBC 10.4 13.0*     Intake/Output Summary (Last 24 hours) at 10/29/2021 0916  Last data filed at 10/29/2021 0500  Gross per 24 hour   Intake 6232 ml   Output 625 ml   Net 5607 ml     Cultures  Date Source Results   10/27/2021 Blood x 2 No growth - prelim   10/27/2021 Pneumonia PCR MRSA & Adenovirus   10/27/2021 Pleural fluid No organisms observed   10/27/2021 Urine No growth   10/28/2021 Respiratory culture Pending     Ht Readings from Last 1 Encounters:   10/28/21 4' 9\" (1.448 m)        Wt Readings from Last 1 Encounters:   10/29/21 175 lb 7.8 oz (79.6 kg)       Body mass index is 37.97 kg/m². CrCl cannot be calculated (Unknown ideal weight.). Random: 14.4    Assessment/Plan:  Give 1250 mg x 1 and obtain a random level tomorrow morning.     Fanny HassanD, BCPS, BCCCP  10/29/2021 9:20 AM

## 2021-10-29 NOTE — BRIEF OP NOTE
[address] [address]  Sedation/Analgesia Post Sedation Record    Pt Name: Svetlana Brian  Account number: [de-identified]  MRN: [id number]  YOB: 1950  Procedure Performed By: German Mancini MD MD   Primary Care Physician: Colleen Razo MD  Date: 10/29/2021    POST-PROCEDURE    Physicians/Assistants: German Manciin MD MD     Procedure Performed:RHC, [place] Catheter placement (Roslyn Eckert), Right radial arterial line placement        Sedation/Anesthesia: Versed/ Fentanyl and 2% xylocaine local anesthesia. Estimated Blood Loss: < 50 ml. Specimens Removed: None         Disposition of Specimen: N/A        Complications: No Immediate Complications. RHC/Hemodynamics:  RA (mean) 20 mmHg  RV 81/10 mmHg  PAP  82/31 mmHg, (mean) 50 mmHg  PCWP (mean) 27 mmHg  CO 11.5 L/m  CI 6.9 L/m/m2    TPG 23 mmHg   PVR 3.2 SANCHEZ       Post-procedure Diagnosis/Findings:        S/P RHC, [place] Catheter placement ([facility]), Right radial arterial line placement    Severe, mixed (venous/arterial) pulmonary hypertension   Elevated right and left sided filling pressures    Volume overload    Acute on chronic HFpEF    Recommendations:   She is being treated in ICU for respiratory failure, pneumonia    DCCV was not required as patient has converted to SR. On Heparin gtt, held for Cath, may resume in 2 hours post procedure    Patient has known h/o PAH, on Riociguat    Hemodynamics are also c/w pulmonary venous HTN component, decompensated HFpEF    Stop IVF   Start Bumex 1 mg IV BID   Keep Mg>2, K>4    Monitor daily weight, I/O, BMP   [place]asia Cath and right radial arterial sheath were kept in place for hemodynamic monitoring   Transduce the right radial 4 Fr sheath. Remove with subsequent VascBand appliance to achieve hemostasis, once BP is more stable and arterial line is not needed      Above findings and plan of care were discussed with patient and family, questions were answered, agreeable with plan. Giselle Temple MD, Margorie Litten   Electronically signed 10/29/2021 at 11:34 AM  Interventional Cardiology

## 2021-10-30 ENCOUNTER — APPOINTMENT (OUTPATIENT)
Dept: ULTRASOUND IMAGING | Age: 71
DRG: 004 | End: 2021-10-30
Payer: MEDICARE

## 2021-10-30 ENCOUNTER — APPOINTMENT (OUTPATIENT)
Dept: GENERAL RADIOLOGY | Age: 71
DRG: 004 | End: 2021-10-30
Payer: MEDICARE

## 2021-10-30 LAB
ALBUMIN SERPL-MCNC: 2.5 G/DL (ref 3.5–5.1)
ALP BLD-CCNC: 101 U/L (ref 38–126)
ALT SERPL-CCNC: 8 U/L (ref 11–66)
ANION GAP SERPL CALCULATED.3IONS-SCNC: 11 MEQ/L (ref 8–16)
AST SERPL-CCNC: 16 U/L (ref 5–40)
BASOPHILS # BLD: 0.1 %
BASOPHILS ABSOLUTE: 0 THOU/MM3 (ref 0–0.1)
BILIRUB SERPL-MCNC: < 0.2 MG/DL (ref 0.3–1.2)
BUN BLDV-MCNC: 29 MG/DL (ref 7–22)
CALCIUM SERPL-MCNC: 8.8 MG/DL (ref 8.5–10.5)
CHLORIDE BLD-SCNC: 104 MEQ/L (ref 98–111)
CO2: 22 MEQ/L (ref 23–33)
CORTISOL: 8.12 UG/DL
CREAT SERPL-MCNC: 1.8 MG/DL (ref 0.4–1.2)
EOSINOPHIL # BLD: 0 %
EOSINOPHILS ABSOLUTE: 0 THOU/MM3 (ref 0–0.4)
ERYTHROCYTE [DISTWIDTH] IN BLOOD BY AUTOMATED COUNT: 16.9 % (ref 11.5–14.5)
ERYTHROCYTE [DISTWIDTH] IN BLOOD BY AUTOMATED COUNT: 52.6 FL (ref 35–45)
GFR SERPL CREATININE-BSD FRML MDRD: 28 ML/MIN/1.73M2
GLUCOSE BLD-MCNC: 172 MG/DL (ref 70–108)
HCT VFR BLD CALC: 26.6 % (ref 37–47)
HEMOGLOBIN: 7.7 GM/DL (ref 12–16)
HEPARIN UNFRACTIONATED: 0.51 U/ML (ref 0.3–0.7)
HYPOCHROMIA: PRESENT
IMMATURE GRANS (ABS): 0.09 THOU/MM3 (ref 0–0.07)
IMMATURE GRANULOCYTES: 0.6 %
LYMPHOCYTES # BLD: 2.4 %
LYMPHOCYTES ABSOLUTE: 0.4 THOU/MM3 (ref 1–4.8)
MAGNESIUM: 1.7 MG/DL (ref 1.6–2.4)
MCH RBC QN AUTO: 25.1 PG (ref 26–33)
MCHC RBC AUTO-ENTMCNC: 28.9 GM/DL (ref 32.2–35.5)
MCV RBC AUTO: 86.6 FL (ref 81–99)
MONOCYTES # BLD: 1.4 %
MONOCYTES ABSOLUTE: 0.2 THOU/MM3 (ref 0.4–1.3)
NUCLEATED RED BLOOD CELLS: 0 /100 WBC
PHOSPHORUS: 4.8 MG/DL (ref 2.4–4.7)
PLATELET # BLD: 197 THOU/MM3 (ref 130–400)
PMV BLD AUTO: 8.7 FL (ref 9.4–12.4)
POTASSIUM SERPL-SCNC: 4.8 MEQ/L (ref 3.5–5.2)
PROCALCITONIN: 0.49 NG/ML (ref 0.01–0.09)
RBC # BLD: 3.07 MILL/MM3 (ref 4.2–5.4)
SEG NEUTROPHILS: 95.5 %
SEGMENTED NEUTROPHILS ABSOLUTE COUNT: 14 THOU/MM3 (ref 1.8–7.7)
SODIUM BLD-SCNC: 137 MEQ/L (ref 135–145)
TOTAL PROTEIN: 5.6 G/DL (ref 6.1–8)
VANCOMYCIN RANDOM: 21.1 UG/ML (ref 0.1–39.9)
WBC # BLD: 14.7 THOU/MM3 (ref 4.8–10.8)

## 2021-10-30 PROCEDURE — 2580000003 HC RX 258: Performed by: EMERGENCY MEDICINE

## 2021-10-30 PROCEDURE — 80053 COMPREHEN METABOLIC PANEL: CPT

## 2021-10-30 PROCEDURE — 6360000002 HC RX W HCPCS: Performed by: STUDENT IN AN ORGANIZED HEALTH CARE EDUCATION/TRAINING PROGRAM

## 2021-10-30 PROCEDURE — 36415 COLL VENOUS BLD VENIPUNCTURE: CPT

## 2021-10-30 PROCEDURE — 6360000002 HC RX W HCPCS: Performed by: EMERGENCY MEDICINE

## 2021-10-30 PROCEDURE — 99291 CRITICAL CARE FIRST HOUR: CPT | Performed by: INTERNAL MEDICINE

## 2021-10-30 PROCEDURE — 2500000003 HC RX 250 WO HCPCS: Performed by: FAMILY MEDICINE

## 2021-10-30 PROCEDURE — 76705 ECHO EXAM OF ABDOMEN: CPT

## 2021-10-30 PROCEDURE — 84100 ASSAY OF PHOSPHORUS: CPT

## 2021-10-30 PROCEDURE — 6370000000 HC RX 637 (ALT 250 FOR IP): Performed by: FAMILY MEDICINE

## 2021-10-30 PROCEDURE — 83735 ASSAY OF MAGNESIUM: CPT

## 2021-10-30 PROCEDURE — 6360000002 HC RX W HCPCS: Performed by: PHARMACIST

## 2021-10-30 PROCEDURE — 2580000003 HC RX 258: Performed by: PHARMACIST

## 2021-10-30 PROCEDURE — P9041 ALBUMIN (HUMAN),5%, 50ML: HCPCS

## 2021-10-30 PROCEDURE — 84145 PROCALCITONIN (PCT): CPT

## 2021-10-30 PROCEDURE — 2000000000 HC ICU R&B

## 2021-10-30 PROCEDURE — 6370000000 HC RX 637 (ALT 250 FOR IP): Performed by: INTERNAL MEDICINE

## 2021-10-30 PROCEDURE — 6360000002 HC RX W HCPCS: Performed by: FAMILY MEDICINE

## 2021-10-30 PROCEDURE — 2700000000 HC OXYGEN THERAPY PER DAY

## 2021-10-30 PROCEDURE — 94761 N-INVAS EAR/PLS OXIMETRY MLT: CPT

## 2021-10-30 PROCEDURE — 85520 HEPARIN ASSAY: CPT

## 2021-10-30 PROCEDURE — 2580000003 HC RX 258: Performed by: INTERNAL MEDICINE

## 2021-10-30 PROCEDURE — 71045 X-RAY EXAM CHEST 1 VIEW: CPT

## 2021-10-30 PROCEDURE — 2500000003 HC RX 250 WO HCPCS: Performed by: INTERNAL MEDICINE

## 2021-10-30 PROCEDURE — 2500000003 HC RX 250 WO HCPCS: Performed by: STUDENT IN AN ORGANIZED HEALTH CARE EDUCATION/TRAINING PROGRAM

## 2021-10-30 PROCEDURE — 2580000003 HC RX 258: Performed by: STUDENT IN AN ORGANIZED HEALTH CARE EDUCATION/TRAINING PROGRAM

## 2021-10-30 PROCEDURE — C9113 INJ PANTOPRAZOLE SODIUM, VIA: HCPCS | Performed by: STUDENT IN AN ORGANIZED HEALTH CARE EDUCATION/TRAINING PROGRAM

## 2021-10-30 PROCEDURE — 94003 VENT MGMT INPAT SUBQ DAY: CPT

## 2021-10-30 PROCEDURE — 6370000000 HC RX 637 (ALT 250 FOR IP): Performed by: STUDENT IN AN ORGANIZED HEALTH CARE EDUCATION/TRAINING PROGRAM

## 2021-10-30 PROCEDURE — 80202 ASSAY OF VANCOMYCIN: CPT

## 2021-10-30 PROCEDURE — 82533 TOTAL CORTISOL: CPT

## 2021-10-30 PROCEDURE — 6360000002 HC RX W HCPCS

## 2021-10-30 PROCEDURE — 6360000002 HC RX W HCPCS: Performed by: NURSE PRACTITIONER

## 2021-10-30 PROCEDURE — 94640 AIRWAY INHALATION TREATMENT: CPT

## 2021-10-30 PROCEDURE — 85025 COMPLETE CBC W/AUTO DIFF WBC: CPT

## 2021-10-30 RX ORDER — ALBUMIN, HUMAN INJ 5% 5 %
25 SOLUTION INTRAVENOUS ONCE
Status: COMPLETED | OUTPATIENT
Start: 2021-10-30 | End: 2021-10-30

## 2021-10-30 RX ORDER — ALBUMIN, HUMAN INJ 5% 5 %
SOLUTION INTRAVENOUS
Status: COMPLETED
Start: 2021-10-30 | End: 2021-10-30

## 2021-10-30 RX ADMIN — DEXAMETHASONE SODIUM PHOSPHATE 6 MG: 4 INJECTION, SOLUTION INTRA-ARTICULAR; INTRALESIONAL; INTRAMUSCULAR; INTRAVENOUS; SOFT TISSUE at 08:44

## 2021-10-30 RX ADMIN — VANCOMYCIN HYDROCHLORIDE 750 MG: 1 INJECTION, POWDER, LYOPHILIZED, FOR SOLUTION INTRAVENOUS at 15:33

## 2021-10-30 RX ADMIN — Medication 11 MCG/MIN: at 15:54

## 2021-10-30 RX ADMIN — HEPARIN SODIUM 22 UNITS/KG/HR: 10000 INJECTION, SOLUTION INTRAVENOUS at 06:50

## 2021-10-30 RX ADMIN — ALBUTEROL SULFATE 2.5 MG: 2.5 SOLUTION RESPIRATORY (INHALATION) at 17:01

## 2021-10-30 RX ADMIN — ALBUTEROL SULFATE 2.5 MG: 2.5 SOLUTION RESPIRATORY (INHALATION) at 06:33

## 2021-10-30 RX ADMIN — ALBUMIN, HUMAN INJ 5% 25 G: 5 SOLUTION at 20:45

## 2021-10-30 RX ADMIN — ALBUTEROL SULFATE 2.5 MG: 2.5 SOLUTION RESPIRATORY (INHALATION) at 12:49

## 2021-10-30 RX ADMIN — ALBUMIN (HUMAN) 25 G: 12.5 INJECTION, SOLUTION INTRAVENOUS at 20:45

## 2021-10-30 RX ADMIN — SODIUM HYPOCHLORITE: 1.25 SOLUTION TOPICAL at 15:34

## 2021-10-30 RX ADMIN — HEPARIN SODIUM 22 UNITS/KG/HR: 10000 INJECTION, SOLUTION INTRAVENOUS at 21:54

## 2021-10-30 RX ADMIN — SODIUM CHLORIDE, PRESERVATIVE FREE 10 ML: 5 INJECTION INTRAVENOUS at 08:39

## 2021-10-30 RX ADMIN — POTASSIUM CHLORIDE 10 MEQ: 1500 TABLET, EXTENDED RELEASE ORAL at 08:55

## 2021-10-30 RX ADMIN — AMIODARONE HYDROCHLORIDE 0.5 MG/MIN: 1.8 INJECTION, SOLUTION INTRAVENOUS at 04:09

## 2021-10-30 RX ADMIN — BUMETANIDE 1 MG/HR: 0.25 INJECTION, SOLUTION INTRAMUSCULAR; INTRAVENOUS at 15:38

## 2021-10-30 RX ADMIN — ALBUTEROL SULFATE 10 MG: 2.5 SOLUTION RESPIRATORY (INHALATION) at 23:53

## 2021-10-30 RX ADMIN — PANTOPRAZOLE SODIUM 40 MG: 40 INJECTION, POWDER, FOR SOLUTION INTRAVENOUS at 08:44

## 2021-10-30 RX ADMIN — ALBUTEROL SULFATE 2.5 MG: 2.5 SOLUTION RESPIRATORY (INHALATION) at 00:44

## 2021-10-30 RX ADMIN — BUMETANIDE 1 MG: 0.25 INJECTION, SOLUTION INTRAMUSCULAR; INTRAVENOUS at 08:39

## 2021-10-30 RX ADMIN — ALLOPURINOL 300 MG: 300 TABLET ORAL at 08:44

## 2021-10-30 RX ADMIN — FENTANYL CITRATE 25 MCG/HR: 50 INJECTION INTRAMUSCULAR; INTRAVENOUS at 03:08

## 2021-10-30 RX ADMIN — AMIODARONE HYDROCHLORIDE 0.5 MG/MIN: 1.8 INJECTION, SOLUTION INTRAVENOUS at 15:54

## 2021-10-30 RX ADMIN — SODIUM CHLORIDE 25 ML: 9 INJECTION, SOLUTION INTRAVENOUS at 01:29

## 2021-10-30 RX ADMIN — ASPIRIN 81 MG: 81 TABLET, FILM COATED ORAL at 08:39

## 2021-10-30 RX ADMIN — POTASSIUM CHLORIDE 10 MEQ: 1500 TABLET, EXTENDED RELEASE ORAL at 18:39

## 2021-10-30 ASSESSMENT — PULMONARY FUNCTION TESTS
PIF_VALUE: 27
PIF_VALUE: 25
PIF_VALUE: 28
PIF_VALUE: 27
PIF_VALUE: 26
PIF_VALUE: 27
PIF_VALUE: 25

## 2021-10-30 NOTE — PROGRESS NOTES
Pharmacy Vancomycin Consult     Vancomycin Day: 3  Current Dosing: intermittent  Current indication: pneumoia    Temp max:  98.4    Recent Labs     10/29/21  0435 10/30/21  0530   BUN 28* 29*   CREATININE 1.4* 1.8*   WBC 13.0* 14.7*       Intake/Output Summary (Last 24 hours) at 10/30/2021 0839  Last data filed at 10/30/2021 0457  Gross per 24 hour   Intake 2988.76 ml   Output 1600 ml   Net 1388.76 ml     Cultures  Date Source Results   10/27/2021 Blood x 2 No growth - prelim   10/27/2021 Pneumonia PCR MRSA & Adenovirus   10/27/2021 Pleural fluid No organisms observed   10/27/2021 Urine No growth   10/28/2021 Respiratory culture Pending       Ht Readings from Last 1 Encounters:   10/28/21 4' 9\" (1.448 m)        Wt Readings from Last 1 Encounters:   10/30/21 182 lb 12.2 oz (82.9 kg)       Body mass index is 39.55 kg/m². CrCl cannot be calculated (Unknown ideal weight.). Random: 21.1    Assessment/Plan:  Start maintenance dose of vanc 750mg q24h. May need to adjust dose/frequency if renal function continues to decline        Mitchel Russ, BCPS, BCCCP 10/30/2021 9:00 AM

## 2021-10-30 NOTE — PROGRESS NOTES
Intensive Care Unit  Medical Intensive Care Unit  Attending Progress Note      Team members present on rounds:  Nursing, Patient and Respiratory Therapist    ICU guidelines/prophylaxis active for the following:    head above bed above 30 degrees, insulin guidelines, DVT prophylaxis, ulcer prophylaxis, analgesia/sedation guidelines and ventilator requirement as Per standard protocol. Patient Examined?  yes    Intubated and sedated, patient seen and examined, discussed with the nursing team and respective therapist with the weaning from sedation and weaning trial.  We will continue medication as per cardiology team, ventilator setting FiO2 40% PEEP of 5 inspiratory pressure of 20. No acute events    MOY since admission +7.8 L      General:  Sedated on vent. No ventilation desynchronization  HEENT:  normocephalic and atraumatic. No scleral icterus. PERR  Neck: supple. No Thyromegaly. Lungs: clear to auscultation. No retractions  Cardiac: RRR. No JVD. Abdomen: soft. Nontender. Extremities:  No clubbing, cyanosis, mild upper extremity edema, no lower extremity edema. Vasculature: capillary refill < 3 seconds. Palpable dorsalis pedis pulses. Skin:  warm and dry. Psych:   Unobtainable, sedated   lymph:  No supraclavicular adenopathy.   Neurologic:   Unobtainable sedated    DATA:  CBC:   Lab Results   Component Value Date    WBC 14.7 10/30/2021    RBC 3.07 10/30/2021    RBC 4.15 01/09/2018    HGB 7.7 10/30/2021    HCT 26.6 10/30/2021    MCV 86.6 10/30/2021    RDW 12.9 01/09/2018     10/30/2021     CMP:    Lab Results   Component Value Date     10/30/2021    K 4.8 10/30/2021    K 5.3 10/27/2021     10/30/2021    CO2 22 10/30/2021    BUN 29 10/30/2021    CREATININE 1.8 10/30/2021    LABGLOM 28 10/30/2021    GLUCOSE 172 10/30/2021    GLUCOSE 103 01/09/2018    PROT 5.6 10/30/2021    CALCIUM 8.8 10/30/2021    BILITOT <0.2 10/30/2021    ALKPHOS 101 10/30/2021    AST 16 10/30/2021    ALT 8 10/30/2021       KEY ISSUES/FINDINGS/ASSESSMENT AND PLAN:  Bright Angulo is 77 years old female who presented to the hospital with shortness of breath.  Past medical history significant with CHF, pulmonary hypertension, sacral wound stage III - s/p surgical wound ostomy (9/2021).   Patient sister reports that patient home O2 Sat 51% thus family deiced to take her to the hospital.  In the emergency room, patient ABG show acute hypoxic hypercapnic respiratory failure with pH 7.19, PCO2 80, PO2 134.  Patient was intubated.  Repeat ABG showed pH 7.39, PCO2 42, PO2 87.  Bumex was held due to ALVIN.  On 10/28, patient was transfer into ICU. During ICU course, patient telemetry show new onset A. fib. Patient was received amiodarone 150 mL bolus and drip. Cardiologist was consult, Cath Lab will planning on October     Assessment and Plan:    1. Acute hypercapnic hypoxic respiratory failure: Likely secondary to obstructive sleep apnea, CPAP noncompliant. And pneumonia with pulmonary hypertension and pulmonary edema.  Plan: Lung protective strategy, cont vent support. Weaning sedation and then weaning trial.  Bumex 1 mg twice daily, with patient positive balance with the possible to increase diuresis, continue IV antibiotics, repeat procalcitonin and continue pulmonary vasodilator.     2. Pneumonia versus atelectasis:  Pneumonia panel (10/28): positive MRSA, Adenovirus. Plan: Vancomycin day 2 (started 10/28)  3. Exudate Left pleural effusion: Improved. S/p thoracocentesis (10/27) with 1.1L purulent fluid drainage. LDH resume LDH pleural/serum 114/134. Cell count no evidence of malignant cell. CXR (10/29):  Diffuse infiltrate to both lung bilateral with small bilateral pleural effusion.       4. HFpEF with pul hypertension:   · S/p right heart cath: S/P RHC, [place] Catheter placement ([facility]), Right radial arterial line placement   · Severe, mixed (venous/arterial) pulmonary hypertension  · Elevated right and left sided filling pressures   · Volume overload   · Acute on chronic HFpEF NYHA class II.  Pulse likely secondary to pulmonary hypertension, hypertension echo with May 2021 show LVEF 55 to 60%, no regional left wall motion abnormality, no abnormality wall thickness, grade 2 diastolic dysfunction.  BNP on admission 883.  Patient had been seen by Dr. Linda [facility] cardiologist. Evelyn Ramires office visit in October 2021. Plan: Hold metoprolol dur to hypotension  · Atrial fib without RVR: [id number] 4. Tele show A fib with -140s. Plan: EKG, Troponin, tele monitor, echo, Amiodarone 150 bolus then drip, Lovenox 80 mg BID       5. Iron deficiency anemia:  Secondary to iron deficiency anemia. Hg 9.6 with patient Hg base line 9.3. MCV 85.9. Plan: Iron replacement     6. ALVIN on CKD stage III: BUN/Cr 31/1.5 Patient baseline BUN/Cr 13/1. Plan: Hold Bumex, NS 50 ml/hrs, bolus 500ml NS     7. 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 Obstructive sleep apnea: CPAP noncompliant. Plan: cont CPAP on extubation     9. Hyperkalemia: Resolve. K 5.3--> 4.7. Plan cont monitor  10. Troponin elevation  11. Sacral wound stage III: SP wound ostomy in September 2021. Plan: Blood culture, abx (see 2), turn patient every 2 hours   12.  Gout: Plan continue albuterol      GI DVT prophylaxis    Critical condition, guarded prognosis  Critical care time 36 min apart from procedures

## 2021-10-30 NOTE — PLAN OF CARE
Problem: Non-Violent Restraints  Goal: Removal from restraints as soon as assessed to be safe  Outcome: Ongoing  Note: Patient does not currently meet the criteria for removal of restraint, however will continue to reassess. Passive ROM performed and patient turned every two hours. Bony prominences elevated on pillows. Goal: No harm/injury to patient while restraints in use  Outcome: Ongoing  Note: Patient remains free of physical injury and harm this shift  Goal: Patient's dignity will be maintained  Outcome: Ongoing  Note: Patient's dignity has been maintained this shift. Care plan reviewed with patient's sister Kyra Romero Shelly verbalizes understanding of the plan of care and contributed to goal setting.

## 2021-10-31 ENCOUNTER — APPOINTMENT (OUTPATIENT)
Dept: CT IMAGING | Age: 71
DRG: 004 | End: 2021-10-31
Payer: MEDICARE

## 2021-10-31 LAB
ABO: NORMAL
ALBUMIN SERPL-MCNC: 2.6 G/DL (ref 3.5–5.1)
ALP BLD-CCNC: 85 U/L (ref 38–126)
ALT SERPL-CCNC: 7 U/L (ref 11–66)
ANION GAP SERPL CALCULATED.3IONS-SCNC: 14 MEQ/L (ref 8–16)
ANTIBODY SCREEN: NORMAL
AST SERPL-CCNC: 11 U/L (ref 5–40)
BASOPHILS # BLD: 0.1 %
BASOPHILS ABSOLUTE: 0 THOU/MM3 (ref 0–0.1)
BILIRUB SERPL-MCNC: < 0.2 MG/DL (ref 0.3–1.2)
BUN BLDV-MCNC: 36 MG/DL (ref 7–22)
CALCIUM SERPL-MCNC: 8.8 MG/DL (ref 8.5–10.5)
CHLORIDE BLD-SCNC: 103 MEQ/L (ref 98–111)
CO2: 20 MEQ/L (ref 23–33)
CREAT SERPL-MCNC: 1.9 MG/DL (ref 0.4–1.2)
EOSINOPHIL # BLD: 0 %
EOSINOPHILS ABSOLUTE: 0 THOU/MM3 (ref 0–0.4)
ERYTHROCYTE [DISTWIDTH] IN BLOOD BY AUTOMATED COUNT: 16.8 % (ref 11.5–14.5)
ERYTHROCYTE [DISTWIDTH] IN BLOOD BY AUTOMATED COUNT: 16.9 % (ref 11.5–14.5)
ERYTHROCYTE [DISTWIDTH] IN BLOOD BY AUTOMATED COUNT: 51.1 FL (ref 35–45)
ERYTHROCYTE [DISTWIDTH] IN BLOOD BY AUTOMATED COUNT: 52.5 FL (ref 35–45)
GFR SERPL CREATININE-BSD FRML MDRD: 26 ML/MIN/1.73M2
GLUCOSE BLD-MCNC: 153 MG/DL (ref 70–108)
GRAM STAIN RESULT: ABNORMAL
HCT VFR BLD CALC: 23.2 % (ref 37–47)
HCT VFR BLD CALC: 26.1 % (ref 37–47)
HEMOGLOBIN: 6.7 GM/DL (ref 12–16)
HEMOGLOBIN: 7.8 GM/DL (ref 12–16)
HEPARIN UNFRACTIONATED: 0.44 U/ML (ref 0.3–0.7)
HYPOCHROMIA: PRESENT
IMMATURE GRANS (ABS): 0.11 THOU/MM3 (ref 0–0.07)
IMMATURE GRANULOCYTES: 0.8 %
LYMPHOCYTES # BLD: 4.5 %
LYMPHOCYTES ABSOLUTE: 0.6 THOU/MM3 (ref 1–4.8)
MAGNESIUM: 1.7 MG/DL (ref 1.6–2.4)
MCH RBC QN AUTO: 24.5 PG (ref 26–33)
MCH RBC QN AUTO: 25.7 PG (ref 26–33)
MCHC RBC AUTO-ENTMCNC: 28.9 GM/DL (ref 32.2–35.5)
MCHC RBC AUTO-ENTMCNC: 29.9 GM/DL (ref 32.2–35.5)
MCV RBC AUTO: 85 FL (ref 81–99)
MCV RBC AUTO: 85.9 FL (ref 81–99)
MONOCYTES # BLD: 4.6 %
MONOCYTES ABSOLUTE: 0.6 THOU/MM3 (ref 0.4–1.3)
NUCLEATED RED BLOOD CELLS: 0 /100 WBC
ORGANISM: ABNORMAL
PHOSPHORUS: 4.3 MG/DL (ref 2.4–4.7)
PLATELET # BLD: 169 THOU/MM3 (ref 130–400)
PLATELET # BLD: 186 THOU/MM3 (ref 130–400)
PMV BLD AUTO: 8.5 FL (ref 9.4–12.4)
PMV BLD AUTO: 9.2 FL (ref 9.4–12.4)
POTASSIUM SERPL-SCNC: 4.3 MEQ/L (ref 3.5–5.2)
POTASSIUM SERPL-SCNC: 4.6 MEQ/L (ref 3.5–5.2)
RBC # BLD: 2.73 MILL/MM3 (ref 4.2–5.4)
RBC # BLD: 3.04 MILL/MM3 (ref 4.2–5.4)
RESPIRATORY CULTURE: ABNORMAL
RH FACTOR: NORMAL
SEG NEUTROPHILS: 90 %
SEGMENTED NEUTROPHILS ABSOLUTE COUNT: 12.3 THOU/MM3 (ref 1.8–7.7)
SODIUM BLD-SCNC: 137 MEQ/L (ref 135–145)
TOTAL PROTEIN: 5.7 G/DL (ref 6.1–8)
WBC # BLD: 11.9 THOU/MM3 (ref 4.8–10.8)
WBC # BLD: 13.7 THOU/MM3 (ref 4.8–10.8)

## 2021-10-31 PROCEDURE — 6360000002 HC RX W HCPCS: Performed by: EMERGENCY MEDICINE

## 2021-10-31 PROCEDURE — 83735 ASSAY OF MAGNESIUM: CPT

## 2021-10-31 PROCEDURE — C9113 INJ PANTOPRAZOLE SODIUM, VIA: HCPCS | Performed by: STUDENT IN AN ORGANIZED HEALTH CARE EDUCATION/TRAINING PROGRAM

## 2021-10-31 PROCEDURE — 6360000002 HC RX W HCPCS: Performed by: FAMILY MEDICINE

## 2021-10-31 PROCEDURE — 84132 ASSAY OF SERUM POTASSIUM: CPT

## 2021-10-31 PROCEDURE — 86850 RBC ANTIBODY SCREEN: CPT

## 2021-10-31 PROCEDURE — 2000000000 HC ICU R&B

## 2021-10-31 PROCEDURE — 6360000002 HC RX W HCPCS: Performed by: PHARMACIST

## 2021-10-31 PROCEDURE — 94761 N-INVAS EAR/PLS OXIMETRY MLT: CPT

## 2021-10-31 PROCEDURE — 2580000003 HC RX 258: Performed by: EMERGENCY MEDICINE

## 2021-10-31 PROCEDURE — 85027 COMPLETE CBC AUTOMATED: CPT

## 2021-10-31 PROCEDURE — 99291 CRITICAL CARE FIRST HOUR: CPT | Performed by: INTERNAL MEDICINE

## 2021-10-31 PROCEDURE — 2500000003 HC RX 250 WO HCPCS: Performed by: STUDENT IN AN ORGANIZED HEALTH CARE EDUCATION/TRAINING PROGRAM

## 2021-10-31 PROCEDURE — 2700000000 HC OXYGEN THERAPY PER DAY

## 2021-10-31 PROCEDURE — 84100 ASSAY OF PHOSPHORUS: CPT

## 2021-10-31 PROCEDURE — 94003 VENT MGMT INPAT SUBQ DAY: CPT

## 2021-10-31 PROCEDURE — 6360000002 HC RX W HCPCS: Performed by: STUDENT IN AN ORGANIZED HEALTH CARE EDUCATION/TRAINING PROGRAM

## 2021-10-31 PROCEDURE — 85025 COMPLETE CBC W/AUTO DIFF WBC: CPT

## 2021-10-31 PROCEDURE — 86900 BLOOD TYPING SEROLOGIC ABO: CPT

## 2021-10-31 PROCEDURE — 86923 COMPATIBILITY TEST ELECTRIC: CPT

## 2021-10-31 PROCEDURE — P9016 RBC LEUKOCYTES REDUCED: HCPCS

## 2021-10-31 PROCEDURE — 85520 HEPARIN ASSAY: CPT

## 2021-10-31 PROCEDURE — 80053 COMPREHEN METABOLIC PANEL: CPT

## 2021-10-31 PROCEDURE — 94640 AIRWAY INHALATION TREATMENT: CPT

## 2021-10-31 PROCEDURE — 2580000003 HC RX 258: Performed by: PHARMACIST

## 2021-10-31 PROCEDURE — 74176 CT ABD & PELVIS W/O CONTRAST: CPT

## 2021-10-31 PROCEDURE — 36415 COLL VENOUS BLD VENIPUNCTURE: CPT

## 2021-10-31 PROCEDURE — 99221 1ST HOSP IP/OBS SF/LOW 40: CPT | Performed by: UROLOGY

## 2021-10-31 PROCEDURE — 2580000003 HC RX 258: Performed by: FAMILY MEDICINE

## 2021-10-31 PROCEDURE — 86901 BLOOD TYPING SEROLOGIC RH(D): CPT

## 2021-10-31 PROCEDURE — 6370000000 HC RX 637 (ALT 250 FOR IP): Performed by: FAMILY MEDICINE

## 2021-10-31 RX ORDER — PANTOPRAZOLE SODIUM 40 MG/10ML
40 INJECTION, POWDER, LYOPHILIZED, FOR SOLUTION INTRAVENOUS 2 TIMES DAILY
Status: DISCONTINUED | OUTPATIENT
Start: 2021-10-31 | End: 2021-10-31

## 2021-10-31 RX ORDER — SODIUM CHLORIDE 9 MG/ML
INJECTION, SOLUTION INTRAVENOUS PRN
Status: DISCONTINUED | OUTPATIENT
Start: 2021-10-31 | End: 2021-11-08

## 2021-10-31 RX ORDER — COSYNTROPIN 0.25 MG/ML
250 INJECTION, POWDER, FOR SOLUTION INTRAMUSCULAR; INTRAVENOUS ONCE
Status: DISCONTINUED | OUTPATIENT
Start: 2021-10-31 | End: 2021-10-31

## 2021-10-31 RX ORDER — PANTOPRAZOLE SODIUM 40 MG/10ML
40 INJECTION, POWDER, LYOPHILIZED, FOR SOLUTION INTRAVENOUS EVERY MORNING
Status: DISCONTINUED | OUTPATIENT
Start: 2021-11-01 | End: 2021-12-01 | Stop reason: HOSPADM

## 2021-10-31 RX ADMIN — ALBUTEROL SULFATE 2.5 MG: 2.5 SOLUTION RESPIRATORY (INHALATION) at 05:26

## 2021-10-31 RX ADMIN — AMIODARONE HYDROCHLORIDE 0.5 MG/MIN: 1.8 INJECTION, SOLUTION INTRAVENOUS at 03:45

## 2021-10-31 RX ADMIN — AMIODARONE HYDROCHLORIDE 0.5 MG/MIN: 1.8 INJECTION, SOLUTION INTRAVENOUS at 15:55

## 2021-10-31 RX ADMIN — PANTOPRAZOLE SODIUM 40 MG: 40 INJECTION, POWDER, FOR SOLUTION INTRAVENOUS at 08:26

## 2021-10-31 RX ADMIN — ASPIRIN 81 MG: 81 TABLET, FILM COATED ORAL at 08:25

## 2021-10-31 RX ADMIN — SODIUM CHLORIDE, PRESERVATIVE FREE 10 ML: 5 INJECTION INTRAVENOUS at 21:35

## 2021-10-31 RX ADMIN — MIDAZOLAM 3 MG/HR: 5 INJECTION, SOLUTION INTRAMUSCULAR; INTRAVENOUS at 00:35

## 2021-10-31 RX ADMIN — HEPARIN SODIUM 22 UNITS/KG/HR: 10000 INJECTION, SOLUTION INTRAVENOUS at 12:50

## 2021-10-31 RX ADMIN — ALBUTEROL SULFATE 2.5 MG: 2.5 SOLUTION RESPIRATORY (INHALATION) at 13:06

## 2021-10-31 RX ADMIN — ALLOPURINOL 300 MG: 300 TABLET ORAL at 08:25

## 2021-10-31 RX ADMIN — DEXAMETHASONE SODIUM PHOSPHATE 6 MG: 4 INJECTION, SOLUTION INTRA-ARTICULAR; INTRALESIONAL; INTRAMUSCULAR; INTRAVENOUS; SOFT TISSUE at 08:23

## 2021-10-31 RX ADMIN — CEFTRIAXONE SODIUM 1000 MG: 1 INJECTION, POWDER, FOR SOLUTION INTRAMUSCULAR; INTRAVENOUS at 00:38

## 2021-10-31 RX ADMIN — FENTANYL CITRATE 75 MCG/HR: 50 INJECTION INTRAMUSCULAR; INTRAVENOUS at 02:05

## 2021-10-31 RX ADMIN — ALBUTEROL SULFATE 2.5 MG: 2.5 SOLUTION RESPIRATORY (INHALATION) at 16:56

## 2021-10-31 RX ADMIN — VANCOMYCIN HYDROCHLORIDE 750 MG: 1 INJECTION, POWDER, LYOPHILIZED, FOR SOLUTION INTRAVENOUS at 15:15

## 2021-10-31 RX ADMIN — SODIUM CHLORIDE, PRESERVATIVE FREE 10 ML: 5 INJECTION INTRAVENOUS at 08:29

## 2021-10-31 RX ADMIN — FENTANYL CITRATE 75 MCG/HR: 50 INJECTION INTRAMUSCULAR; INTRAVENOUS at 19:48

## 2021-10-31 ASSESSMENT — PULMONARY FUNCTION TESTS
PIF_VALUE: 30
PIF_VALUE: 26
PIF_VALUE: 25
PIF_VALUE: 27
PIF_VALUE: 27

## 2021-10-31 NOTE — FLOWSHEET NOTE
10/31/21 0509   Provider Notification   Reason for Communication Evaluate   Provider Name Yaneth Glasgow   Provider Notification Advance Practice Clinician (CNS/NP/CNM/CRNA/PA)   Method of Communication Face to face   Response See orders   Notification Time 0500     Patient Hgb 6.7. Notified Yaneth Glasgow, see orders.

## 2021-10-31 NOTE — PROGRESS NOTES
8252 report and beside rounds completed. 0800 Assessment completed. KI Davies in to see pt.  0820 family in updated.

## 2021-10-31 NOTE — CONSULTS
[address]  [address] [facility] [address]  Dept: [phone]  Loc: [phone]  Visit Date: 10/27/2021    Urology Consult Note    Reason for Consult:  Kidney stone  Requesting Physician:  vince    History Obtained From:  Nurse,electronic medical record    Chief Complaint: sob    HISTORY OF PRESENT ILLNESS:                The patient is a 73 y.o. female never smoker PMHx chronic diastolic CHF (EF 06-07%, T2SF per TTE 5/4/21) / NYHA II / chronic RV failure / severe PAH (PA 85/31 - mean 46 / PVR 10), mild AS (valve area 1.7 sq cm), morbid obesity, HTN and depression -- presents to [facility] with a chief complaint of shortness of breath. History obtained from the EMR (patient intubated / sedated on the ventilator). Urology was consulted for kidney stone.   Ct of chest shows staghorn calculus left kidney, atrophic left kidney    Past Medical History:        Diagnosis Date    CHF (congestive heart failure) ([facility])     Dr. Thalia Flores Depression     Gout attack     Hypertension     Osteoarthritis     Pulmonary artery hypertension ([facility] [address]     [facility]-- Dr. Bairon Miramontes-- Dr. Thalia Flores Renal calculi     Dr. Oscar Delacruz [facility])      Past Surgical History:        Procedure Laterality Date   [address]    PRESSURE ULCER DEBRIDEMENT Right 8/6/2021    EXCISION RIGHT BUTTOCK WOUND AND CLOSURE performed by Ita Smith MD at [address] History     Socioeconomic History    Marital status: Single     Spouse name: Not on file    Number of children: 0    Years of education: Not on file    Highest education level: Not on file   Occupational History    Not on file   Tobacco Use    Smoking status: Never Smoker    Smokeless tobacco: Never Used   Vaping Use    Vaping Use: Never used   Substance and Sexual Activity    Alcohol use: No    Drug use: No    Sexual activity: Not Currently   Other Topics Concern    Not on file Social History Narrative    Not on file     Social Determinants of Health     Financial Resource Strain: Low Risk     Difficulty of Paying Living Expenses: Not very hard   Food Insecurity: No Food Insecurity    Worried About Running Out of Food in the Last Year: Never true    Traci of Food in the Last Year: Never true   Transportation Needs:     Lack of Transportation (Medical):  Lack of Transportation (Non-Medical):    Physical Activity:     Days of Exercise per Week:     Minutes of Exercise per Session:    Stress:     Feeling of Stress :    Social Connections:     Frequency of Communication with Friends and Family:     Frequency of Social Gatherings with Friends and Family:     Attends Anabaptism Services:     Active Member of Clubs or Organizations:     Attends Club or Organization Meetings:     Marital Status:    Intimate Partner Violence:     Fear of Current or Ex-Partner:     Emotionally Abused:     Physically Abused:     Sexually Abused:        AL  Family History   Problem Relation Age of Onset    Diabetes Father     Arthritis Mother     COPD Mother     Diabetes Sister     Heart Disease Maternal Uncle     Breast Cancer Niece 36    Sleep Apnea Brother     Asthma Neg Hx     Birth Defects Neg Hx     Cancer Neg Hx     Depression Neg Hx     Early Death Neg Hx     Hearing Loss Neg Hx     High Blood Pressure Neg Hx     High Cholesterol Neg Hx     Kidney Disease Neg Hx     Learning Disabilities Neg Hx     Mental Illness Neg Hx     Mental Retardation Neg Hx     Miscarriages / Stillbirths Neg Hx     Stroke Neg Hx     Substance Abuse Neg Hx     Vision Loss Neg Hx     Other Neg Hx        Allergies:  No Known Allergies    ROS:  Unable to perform, intubated, sedated    PHYSICAL EXAM:  VITALS:  BP (!) 104/46   Pulse 73   Temp 97.2 °F (36.2 °C) (Core)   Resp 16   Ht 4' 9\" (1.448 m)   Wt 186 lb 11.7 oz (84.7 kg)   SpO2 96%   BMI 40.41 kg/m² .   Nursing note and vitals reviewed. Constitutional:    Sedated on vent, no acute distress and cooperative to examination with appropriate mood and affect. HEENT:   Head:         Normocephalic and atraumatic. Mouth/Throat:          Mucous membranes are normal.   Eyes:         EOM are normal. No scleral icterus. Nose:    The external appearance of the nose is normal  Ears: The ears appear normal to external inspection   Neck:         Supple, symmetrical, trachea midline, no adenopathy, thyroid symmetric, not enlarged and no tenderness. Cardiovascular:        Normal rate, regular rhythm, S1 S2 heart sounds. Pulmonary/Chest:       Chest symmetric with normal A/P diameter Normal respiratory rate and rhthym. No use of accessory muscles. Abdominal:          Soft. No tenderness. Sekou Ramus:    Padilla catheter draining yellow urine  Musculoskeletal:    Normal range of motion. She exhibits no edema or tenderness of lower extremities. Extremities:    No cyanosis, clubbing, or edema present.   Neurological:    sedated    DATA:  CBC:   Lab Results   Component Value Date    WBC 13.7 10/31/2021    RBC 2.73 10/31/2021    RBC 4.15 01/09/2018    HGB 6.7 10/31/2021    HCT 23.2 10/31/2021    MCV 85.0 10/31/2021    MCH 24.5 10/31/2021    MCHC 28.9 10/31/2021    RDW 12.9 01/09/2018     10/31/2021    MPV 8.5 10/31/2021     BMP:    Lab Results   Component Value Date     10/31/2021    K 4.6 10/31/2021    K 5.3 10/27/2021     10/31/2021    CO2 20 10/31/2021    BUN 36 10/31/2021    CREATININE 1.9 10/31/2021    CALCIUM 8.8 10/31/2021    LABGLOM 26 10/31/2021    GLUCOSE 153 10/31/2021    GLUCOSE 103 01/09/2018     BUN/Creatinine:    Lab Results   Component Value Date    BUN 36 10/31/2021    CREATININE 1.9 10/31/2021     Magnesium:    Lab Results   Component Value Date    MG 1.7 10/31/2021     Phosphorus:    Lab Results   Component Value Date    PHOS 4.3 10/31/2021     PT/INR:    Lab Results   Component Value Date    INR 1.24 10/28/2021     U/A:    Lab Results   Component Value Date    COLORU YELLOW 10/27/2021    COLORU YELLOW 10/27/2021    PHUR 5.5 10/27/2021    LABCAST OBSCURED 10/27/2021    LABCAST OBSCURED 10/27/2021    WBCUA OBSCURED 10/27/2021    RBCUA OBSCURED 10/27/2021    MUCUS THREADS 10/25/2021    YEAST OBSCURED 10/27/2021    BACTERIA OBSCURED 10/27/2021    SPECGRAV 1.016 10/27/2021    LEUKOCYTESUR LARGE 10/27/2021    LEUKOCYTESUR LARGE 10/25/2021    UROBILINOGEN 0.2 10/27/2021    BILIRUBINUR NEGATIVE 10/27/2021    BLOODU LARGE 10/27/2021    GLUCOSEU NEGATIVE 03/06/2020    AMORPHOUS OBSCURED 08/23/2019       Imaging: The patient has had a CT With Contrast which I have independently reviewed along with its accompanying report. The study demonstrates   Narrative   PROCEDURE: CT CHEST W WO CONTRAST       CLINICAL INFORMATION: pleural effusion       COMPARISON: None       TECHNIQUE:    5 mm axial imaging through the chest with IV contrast. Coronal and sagittal reconstruction were performed.        All CT scans at this facility use dose modulation, iterative reconstruction, and/or weight based dosing when appropriate to reduce the radiation dose to as low as reasonably achievable.       CONTRAST: 80 mL of Isovue-370           FINDINGS:           The thyroid is markedly enlarged. Aortocoronary calcifications. The main pulmonary artery is dilated at 3.4 cm relating to pulmonary artery hypertension. There is mild circumferential pericardial effusions. A nasogastric tube tube is seen terminating in    them. A left [place]-Kasia catheter has its tip terminating in the proximal right pulmonary artery.       Moderate to large right pleural effusion. Moderate left pleural effusion. Complete collapse of the right lower lobe. Near complete collapse of the left lower lobe. Consolidative opacities seen in the left lower lobe. There is volume loss in the right    upper lobe.  Subsegmental atelectasis seen in the lingula.       The heart is mildly enlarged. Ascending thoracic aorta is not dilated.  A small left thyroid nodule is seen. Segmental atelectasis seen in the upper lobes bilaterally.       No significantly enlarged mediastinal or  axillary lymph node.       Limited evaluation below the diaphragm show a large gallstone in the gallbladder. Staghorn calculus is seen in the left kidney. The left kidney is atrophic. The common bile duct is mildly dilated at 10 mm. The pancreas is atrophic. .       Bones: Degenerative changes of the thoracic spine. .               Impression   1. Moderate to marked right pleural effusion. Moderate left pleural effusion. 2. Complete collapse of the right lower lobe. Near-complete collapse of the left lower lobe. 3. Consolidative opacity seen in the left lower lobe may reflect infectious etiology. 4. Markedly enlarged thyroid. Left thyroid nodule. 5. Main pulmonary artery is dilated relating to pulmonary artery hypertension. 6. The common bile duct is dilated at 10 mm. A large gallstone is present. Staghorn calculus of the left kidney. 7. Other findings as described above. .             IMPRESSION:  Left staghorn calculus  Resp failure  PNA vs atelectasis  Left pleural effusion  HFpEF  Afib  BARBER    Plan:   Continue NPO  On Heparin  Will get CT abd/pelvis wo to evaluate left staghorn, previous TIFFANI in 2019 showed multiple renal stone. Thank you for including us in the care of LANE Stinson - MADELEINE, LANE  10/31/21 9:18 AM  Urology    Addendum [id number]    Hold anticoagulants per IR protocol  Ct shows Large staghorn calculus in the left kidney. Severe left-sided hydronephrosis and hydroureter. Increased density in the left perinephric fat consistent with inflammatory process.   Order for IR to place left nephrostomy tube, will discuss with IR  Reviewed images with Dr Carmen Mccall

## 2021-10-31 NOTE — PROGRESS NOTES
1030 to ct scan per bed. ct abd and pelvis completed. pt had drop in spo2 while in ct scan needed sx and bagged to inprove back to 95%. 1100 returned to room. 1870 Bren Arreola in spoke with family. 1211 Blood started. 1215 Assessment completed. Family her   200 urology np Lilia Bejarano in spoke with family. Discussed with pt receiving nephrostomy tube in I. R. Family voices understanding.

## 2021-10-31 NOTE — CONSULTS
Consult History & Physical      Patient:  Karen Barbosa  YOB: 1950  MRN: [id number]     Acct: [de-identified]    Chief Complaint:    Chief Complaint   Patient presents with    Shortness of Breath       Date of Service: Pt seen/examined in consultation on 10/31/2021    History Of Present Illness:      73 y.o. female who we are asked to see/evaluate by Fain Nissen, MD for medical management of gallstone. Extensive chart review performed to obtain HPI as pt is sedated and intubated on the ventilator and no family is present in room. Pt was admitted 10/27/21 after development of worsening SOB and SpO2 51% on home pulse ox. When arriving in ED, she required emergent intubation due to acute hypoxemic and hypercapneic respiratory failure. Also found to have left sided pleural effusion for which she underwent US guided thoracentesis 10/27 with 1100 mL drained and multi-organism pneumonia of MRSA/adenovirus, on Vancomycin. She also has ALVIN/CKD III and a decubitus ulcer on coccyx. During hospitalization she went into afib RVR and was placed on Amio/Heparin drips. 10/29/21 a CT chest w wo contrast revealed CBD to be dilated at 10 mm, with a larged gallstone present, therefore we are consulted. 10/30/21 US liver/GB reveals cholelithasis and pericholecystic fluid suspicious for acute cholecystitis, with gallbladder wall at ULN in size, and a 2.5 cm echogenic structure in the lumen of the gallbladder, with CBD at ULN in size. No intrahepatic biliary ductal dilation, normal size liver, no intrahepatic lesions. Labs reviewed, with hepatic function normal, bilirubin <0.2, alk phos 85. She has developed worsening iron deficiency anemia, Hg 6.7, Hct 23.2. This was 9.6 on admission. She is to receive blood today. No signs of bleeding are noted, no coffee ground emesis, hematemesis, black or bloody stools, tolerating tube feed. She is on heparin drip. She does have a sacral wound.      [facility] includes CHF, PAH, followed by Dr. Addie Miller and [facility], on [place]; HTN, CKD3 followed by Dr. Maxx Hubbard, chronic normocytic anemia, history gout and chronic depression. Past Medical History:    Past Medical History:   Diagnosis Date    CH[facility]congestive heart failure) ([facility])     Dr. Fern Jackson Depression     Gout attack     Hypertension     Osteoarthritis     Pulmonary artery hypertension ([facility] [address]     [facility]-- Dr. Kj Echevarria-- Dr. Fern Jackson Renal calculi     Dr. Marquise Au [facility])        Home Medications:  Prior to Admission medications    Medication Sig Start Date End Date Taking? Authorizing Provider   metoprolol tartrate (LOPRESSOR) 25 MG tablet Take 0.5 tablets by mouth 2 times daily 10/25/21  Yes LANE Kearns CNP   sodium hypochlorite (DAKINS) 0.125 % SOLN external solution Apply Dakin's moistened gauze dressings to wound twice daily and as needed.  8/24/21  Yes Jearld Najjar, APRN - CNP   sodium chloride 1 g tablet Take 1 tablet by mouth 2 times daily 8/3/21  Yes Yaritza Oscar MD   allopurinol (ZYLOPRIM) 300 MG tablet Take 1 tablet by mouth daily 7/16/21  Yes Henrey Goldberg, MD   FLUoxetine (PROZAC) 40 MG capsule Take 1 capsule by mouth daily 7/16/21  Yes Henrey Goldberg, MD   potassium chloride (KLOR-CON M) 10 MEQ extended release tablet Take 1 tablet by mouth every other day 5/16/21  Yes LANE Alarcon CNP   bumetanide (BUMEX) 1 MG tablet Take 1 tablet by mouth daily 5/16/21  Yes LANE Alarcon CNP   Multiple Vitamins-Minerals (MULTIVITAMIN WOMEN PO) Take 1 tablet by mouth daily   Yes Historical Provider, MD   acetaminophen (TYLENOL) 650 MG extended release tablet Take 650 mg by mouth every 8 hours as needed for Pain   Yes Historical Provider, MD   Riociguat (ADEMPAS) 2.5 MG TABS Take 2.5 mg by mouth 3 times daily   Yes Historical Provider, MD   docusate sodium (COLACE) 100 MG capsule Take 100 mg by mouth as needed    Yes Historical Provider, MD aspirin 81 MG tablet Take 81 mg by mouth daily    Yes Historical Provider, MD       Surgical History:  Past Surgical History:   Procedure Laterality Date   [address]    PRESSURE ULCER DEBRIDEMENT Right 8/6/2021    EXCISION RIGHT BUTTOCK WOUND AND CLOSURE performed by Eric Borges MD at [address] [place] [address] History:  Family History   Problem Relation Age of Onset    Diabetes Father     Arthritis Mother    Parrish Saliva COPD Mother     Diabetes Sister     Heart Disease Maternal Uncle     Breast Cancer Niece 36    Sleep Apnea Brother     Asthma Neg Hx     Birth Defects Neg Hx     Cancer Neg Hx     Depression Neg Hx     Early Death Neg Hx     Hearing Loss Neg Hx     High Blood Pressure Neg Hx     High Cholesterol Neg Hx     Kidney Disease Neg Hx     Learning Disabilities Neg Hx     Mental Illness Neg Hx     Mental Retardation Neg Hx     Miscarriages / Stillbirths Neg Hx     Stroke Neg Hx     Substance Abuse Neg Hx     Vision Loss Neg Hx     Other Neg Hx        Past GI History:  Unavailable to me, she has not been seen in our office. Allergies:  Patient has no known allergies. Social History:   TOBACCO:   reports that she has never smoked. She has never used smokeless tobacco.  ETOH:   reports no history of alcohol use. Review Of Systems  Unable to perform due to pt sedated on ventilator. PHYSICAL EXAM:  BP (!) 116/41   Pulse 68   Temp 96.8 °F (36 °C) (Core)   Resp 16   Ht 4' 9\" (1.448 m)   Wt 182 lb 12.2 oz (82.9 kg)   SpO2 98%   BMI 39.55 kg/m²     General appearance: Oral ET tube to mechanical ventilator noted, sedated, opens eyes to name at times  HEENT: Normal cephalic, atraumatic without obvious deformity. Respiratory:  Normal respiratory effort. Clear to auscultation, bilaterally without Rales/Wheezes/Rhonchi. Cardiovascular: Regular rate and rhythm without murmurs, rubs or gallops.   Abdomen: Soft, non-distended with active bowel sounds. Musculoskeletal: No clubbing, cyanosis or edema bilaterally. Skin: Pink, warm, dry. No rashes or lesions. Pt unable to participate in exam due to sedation. No family at bedside. Labs:   Recent Labs     10/31/21  0440   WBC 13.7*   HGB 6.7*   HCT 23.2*        Recent Labs     10/31/21  0440      K 4.6      CO2 20*   BUN 36*   CREATININE 1.9*   CALCIUM 8.8   PHOS 4.3     Recent Labs     10/31/21  0440   AST 11   ALT 7*   BILITOT <0.2*   ALKPHOS 85     Recent Labs     10/28/21  1600   INR 1.24*       Radiology:   10/30/2021 US liver  COMPARISON: None       FINDINGS: There is an echogenic structure in the lumen of the gallbladder which measures approximately 2.5 cm. Gallbladder wall is at the upper limits of normal in size. There is minimal pericholecystic fluid. The common bile duct is at the upper limits    of normal in size. There is no intrahepatic biliary ductal dilation. The liver is normal in size and echotexture. No intrahepatic lesions are identified. Visualized portions of the pancreas and right kidney are unremarkable. Portal vessels are patent. There is an incompletely visualized right-sided pleural effusion.           Impression   1. Cholelithiasis and pericholecystic fluid suspicious for acute cholecystitis. 2. Normal liver ultrasound. 10/31/2021 US gallbladder RUQ     FINDINGS: There is an echogenic structure in the lumen of the gallbladder which measures approximately 2.5 cm. Gallbladder wall is at the upper limits of normal in size. There is minimal pericholecystic fluid. The common bile duct is at the upper limits    of normal in size. There is no intrahepatic biliary ductal dilation. The liver is normal in size and echotexture. No intrahepatic lesions are identified. Visualized portions of the pancreas and right kidney are unremarkable. Portal vessels are patent. There is an incompletely visualized right-sided pleural effusion.           Impression   1. Cholelithiasis and pericholecystic fluid suspicious for acute cholecystitis. 2. Normal liver ultrasound. 10/29/2021 CT chest w wo contrast  FINDINGS:           The thyroid is markedly enlarged. Aortocoronary calcifications. The main pulmonary artery is dilated at 3.4 cm relating to pulmonary artery hypertension. There is mild circumferential pericardial effusions. A nasogastric tube tube is seen terminating in    them. A left [place]-Kasia catheter has its tip terminating in the proximal right pulmonary artery.       Moderate to large right pleural effusion. Moderate left pleural effusion. Complete collapse of the right lower lobe. Near complete collapse of the left lower lobe. Consolidative opacities seen in the left lower lobe. There is volume loss in the right    upper lobe. Subsegmental atelectasis seen in the lingula.       The heart is mildly enlarged. Ascending thoracic aorta is not dilated.  A small left thyroid nodule is seen. Segmental atelectasis seen in the upper lobes bilaterally.       No significantly enlarged mediastinal or  axillary lymph node.       Limited evaluation below the diaphragm show a large gallstone in the gallbladder. Staghorn calculus is seen in the left kidney. The left kidney is atrophic. The common bile duct is mildly dilated at 10 mm. The pancreas is atrophic. .       Bones: Degenerative changes of the thoracic spine. .               Impression   1. Moderate to marked right pleural effusion. Moderate left pleural effusion. 2. Complete collapse of the right lower lobe. Near-complete collapse of the left lower lobe. 3. Consolidative opacity seen in the left lower lobe may reflect infectious etiology. 4. Markedly enlarged thyroid. Left thyroid nodule. 5. Main pulmonary artery is dilated relating to pulmonary artery hypertension. 6. The common bile duct is dilated at 10 mm. A large gallstone is present. Staghorn calculus of the left kidney.    7. Other findings as described above..       Code Status: Full Code    ASSESSMENT:  1. Gallstone, with suspected acute cholecystitis  2. Dilated CBD, 10 mm on CT scan 10/29, however not dilated on US and stone appears to be in the lumen  3. Acute hypoxemic respiratory failure on mechanical ventilation  4. Leukocytosis, 2/2 #1? On Rocephin, Vancomycin  5. Iron deficiency anemia, no obvious source of bleeding, pt on Heparin, has a wound, staghorn calculus, multiple bloodraws, several etiologies possible, unclear if this is GI related. S/p 1 u PRC today and Venofer 400 mg IV 10/29/21   6. Acute hypoxemic hypercapnic respiratory failure, on ventilator  7. Multi-agent pneumonia, MRSA, adenovirus  8. Left pleural effusion, s/p thoracentesis 1100 mL 10/27  9. CHF  10. PhTN  11. Afib, on Heparin, Amio drips. PLAN:     To work up the dilated CBD, with large gallstone, would require MRI/MRCP, however, the CBD did not appear dilated on US and labs are not obstructive. At this time, pt has multiple supportive devices and is in critical condition. MRCP or ERCP are not recommended at this time.  Pt may have acute cholecystitis, which would warrant a general surgery evaluation if appropriate. Updated Angi Waggoner RN, pt has a staghorn calculus and will be having IR procedure for this.  Will continue to follow with you, and will evaluate HFP daily and examine for obstructive signs.  In addition, if her anemia continues without a source, may need bedside EGD. Recommend increased dose of PPI to BID due to her anemia, however pt has ALVIN with crt 1.9 so will monitor and consider increase if worsening of anemia. Thank you for the consultation. Case reviewed and impression/plan reviewed in collaboration with Dr. Mahendra Garcia    Electronically signed by LANE Smith - CNP on 10/31/2021 at 6:55 AM   [facility]  Thank you for the consultation.

## 2021-10-31 NOTE — PROGRESS NOTES
7Intensive Care Unit  Medical Intensive Care Unit  Attending Progress Note      Subjective    Team members present on rounds:  Nursing, Patient and Respiratory Therapist    ICU guidelines/prophylaxis active for the following:    head above bed above 30 degrees, insulin guidelines, DVT prophylaxis, ulcer prophylaxis, analgesia/sedation guidelines and ventilator protocol. Patient Examined? yes    Additions/differences/highlights to the resident's/fellow's exam:  VITALS:  BP (!) 104/59   Pulse 67   Temp 96.8 °F (36 °C) (Core)   Resp 16   Ht 4' 9\" (1.448 m)   Wt 186 lb 11.7 oz (84.7 kg)   SpO2 98%   BMI 40.41 kg/m²   VENT SETTINGS:    Vent Information  $Ventilation: $Subsequent Day  Skin Assessment: Clean, dry, & intact  Suction Catheter Diameter: 14  Equipment ID: [id number]  Equipment Changed: Vent Circuit  Vent Type: C2G5 Colon  Vent Mode: PCV+  Vt Ordered: 0 mL  Pressure Ordered: 28 (Pcontrol 22)  Rate Set: 16 bmp  Peak Flow: 40 L/min  FiO2 : 40 %  SpO2: 98 %  SpO2/FiO2 ratio: 245  Sensitivity: 3  PEEP/CPAP: 6  I Time/ I Time %: 1 s  Humidification Source: Heated wire  Humidification Temp: 37  Humidification Temp Measured: 36  Circuit Condensation: Drained  Nitric Oxide/Epoprostenol In Use?: No  Mask Type: Full face mask  Mask Size: Medium  Additional Respiratory  Assessments  Pulse: 67  Resp: 16  SpO2: 98 %  Position: Semi-Reveles's  Humidification Source: Heated wire  Humidification Temp: 37  Circuit Condensation: Drained  Oral Care: Mouthwash with chlorhexidine, Mouth swabbed, Mouth moisturizer, Mouth suctioned  Subglottic Suction Done?: Yes  Cuff Pressure (cm H2O): 24 cm H2O     Intake output since admission +8,137 cc    PA catheter data revealed that the patient PA systolic 59 and PA diastolic 18,  and cardiac index 4.8    CONSTITUTIONAL:  Sedated on vent.  No ventilation desynchronization  HEENT:  normocephalic and atraumatic.  No scleral icterus. PERR  Neck: supple.  No Thyromegaly.   Diamond Tee obstructive sleep apnea, CPAP noncompliant. And pneumonia with pulmonary hypertension and pulmonary edema.  Plan: Lung protective strategy, cont vent support. Weaning sedation and then weaning trial.  Bumex 1 mg twice daily, with patient positive balance with the possible to increase diuresis, continue IV antibiotics,  and continue pulmonary vasodilator.     2. Pneumonia versus atelectasis:  Pneumonia panel (10/28): positive MRSA, Adenovirus. Plan: Vancomycin day 3 (started 10/28)  3. Exudate Left pleural effusion: Improved.  S/p thoracocentesis (10/27) with 1.1L purulent fluid drainage. LDH resume LDH pleural/serum 114/134. Cell count no evidence of malignant cell.  CXR (10/29): Diffuse infiltrate to both lung bilateral with small bilateral pleural effusion.       4. HFpEF with pul hypertension:   · S/p right heart cath: S/P RHC, [place]-Kasia Catheter placement ([facility]), Right radial arterial line placement   · Severe, mixed (venous/arterial) pulmonary hypertension  · Elevated right and left sided filling pressures   · Volume overload   · Acute on chronic HFpEF NYHA class II.  Pulse likely secondary to pulmonary hypertension, hypertension echo with May 2021 show LVEF 55 to 60%, no regional left wall motion abnormality, no abnormality wall thickness, grade 2 diastolic dysfunction.  BNP on admission 883.  Patient had been seen by Dr. Talita Amin cardiologist. Brendan Wilkins office visit in October 2021. Plan: Hold metoprolol dur to hypotension  · Atrial fib without RVR: [id number] 4. Tele show A fib with -140s. Plan: EKG, Troponin, tele monitor, echo, Amiodarone 150 bolus then drip, Lovenox 80 mg BID        5. Iron deficiency anemia:  Secondary to iron deficiency anemia. Hg 9.6 with patient Hg base line 9.3. MCV 85.9.  Plan: Iron replacement     7. ALVIN on CKD stage III: Worsening creatinine so Bumex drip stopped, CT scan abdomen did show hydronephrosis with staghorn stone so nephrostomy tube inserted today.     8. 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 Obstructive sleep apnea: CPAP noncompliant. Plan: cont CPAP on extubation     9. Hyperkalemia: Resolve. 10. Sacral wound stage III: SP wound ostomy in September 2021. Plan: Blood culture, abx (see 2), turn patient every 2 hours   11.  Gout: monitor ,         GI DVT prophylaxis     Critical condition, guarded prognosis  Critical care time 36 min apart from procedures

## 2021-10-31 NOTE — FLOWSHEET NOTE
10/30/21 2300   Provider Notification   Reason for Communication Evaluate   Provider Name Betty Robb   Provider Notification Advance Practice Clinician (CNS/NP/CNM/CRNA/PA)   Method of Communication Face to face   Response See orders   Notification Time 2300       Patient becoming dyssynchronous with the ventilator despite increased sedation. Notified Betty Robb CNP.  See orders

## 2021-11-01 ENCOUNTER — APPOINTMENT (OUTPATIENT)
Dept: INTERVENTIONAL RADIOLOGY/VASCULAR | Age: 71
DRG: 004 | End: 2021-11-01
Payer: MEDICARE

## 2021-11-01 ENCOUNTER — APPOINTMENT (OUTPATIENT)
Dept: CT IMAGING | Age: 71
DRG: 004 | End: 2021-11-01
Payer: MEDICARE

## 2021-11-01 LAB
ALBUMIN SERPL-MCNC: 2.7 G/DL (ref 3.5–5.1)
ALLEN TEST: ABNORMAL
ALP BLD-CCNC: 92 U/L (ref 38–126)
ALT SERPL-CCNC: 10 U/L (ref 11–66)
ANAEROBIC CULTURE: NORMAL
ANION GAP SERPL CALCULATED.3IONS-SCNC: 15 MEQ/L (ref 8–16)
AST SERPL-CCNC: 22 U/L (ref 5–40)
BASE EXCESS (CALCULATED): -2.6 MMOL/L (ref -2.5–2.5)
BASOPHILS # BLD: 0.1 %
BASOPHILS ABSOLUTE: 0 THOU/MM3 (ref 0–0.1)
BILIRUB SERPL-MCNC: < 0.2 MG/DL (ref 0.3–1.2)
BODY FLUID CULTURE, STERILE: NORMAL
BUN BLDV-MCNC: 44 MG/DL (ref 7–22)
CALCIUM SERPL-MCNC: 8.6 MG/DL (ref 8.5–10.5)
CHLORIDE BLD-SCNC: 100 MEQ/L (ref 98–111)
CO2: 19 MEQ/L (ref 23–33)
COLLECTED BY:: ABNORMAL
CREAT SERPL-MCNC: 2.1 MG/DL (ref 0.4–1.2)
DEVICE: ABNORMAL
EOSINOPHIL # BLD: 0 %
EOSINOPHILS ABSOLUTE: 0 THOU/MM3 (ref 0–0.4)
ERYTHROCYTE [DISTWIDTH] IN BLOOD BY AUTOMATED COUNT: 17 % (ref 11.5–14.5)
ERYTHROCYTE [DISTWIDTH] IN BLOOD BY AUTOMATED COUNT: 51.6 FL (ref 35–45)
GFR SERPL CREATININE-BSD FRML MDRD: 23 ML/MIN/1.73M2
GLUCOSE BLD-MCNC: 132 MG/DL (ref 70–108)
GRAM STAIN RESULT: NORMAL
HCO3: 21 MMOL/L (ref 23–28)
HCT VFR BLD CALC: 25.5 % (ref 37–47)
HCT VFR BLD CALC: 26.8 % (ref 37–47)
HEMOGLOBIN: 7.7 GM/DL (ref 12–16)
HEMOGLOBIN: 8.1 GM/DL (ref 12–16)
HEPARIN UNFRACTIONATED: 0.3 U/ML (ref 0.3–0.7)
IFIO2: 60
IMMATURE GRANS (ABS): 0.12 THOU/MM3 (ref 0–0.07)
IMMATURE GRANULOCYTES: 0.9 %
LYMPHOCYTES # BLD: 2.8 %
LYMPHOCYTES ABSOLUTE: 0.4 THOU/MM3 (ref 1–4.8)
MAGNESIUM: 1.7 MG/DL (ref 1.6–2.4)
MCH RBC QN AUTO: 25.3 PG (ref 26–33)
MCHC RBC AUTO-ENTMCNC: 30.2 GM/DL (ref 32.2–35.5)
MCV RBC AUTO: 83.9 FL (ref 81–99)
MODE: ABNORMAL
MONOCYTES # BLD: 3.9 %
MONOCYTES ABSOLUTE: 0.5 THOU/MM3 (ref 0.4–1.3)
NUCLEATED RED BLOOD CELLS: 0 /100 WBC
O2 SATURATION: 97 %
PCO2: 30 MMHG (ref 35–45)
PH BLOOD GAS: 7.45 (ref 7.35–7.45)
PHOSPHORUS: 3.8 MG/DL (ref 2.4–4.7)
PIP: 30 CMH2O
PLATELET # BLD: 189 THOU/MM3 (ref 130–400)
PMV BLD AUTO: 9.4 FL (ref 9.4–12.4)
PO2: 89 MMHG (ref 71–104)
POTASSIUM SERPL-SCNC: 4.7 MEQ/L (ref 3.5–5.2)
RBC # BLD: 3.04 MILL/MM3 (ref 4.2–5.4)
SEG NEUTROPHILS: 92.3 %
SEGMENTED NEUTROPHILS ABSOLUTE COUNT: 13 THOU/MM3 (ref 1.8–7.7)
SET PEEP: 6 MMHG
SET RESPIRATORY RATE: 16 BPM
SODIUM BLD-SCNC: 134 MEQ/L (ref 135–145)
SOURCE, BLOOD GAS: ABNORMAL
TOTAL PROTEIN: 5.2 G/DL (ref 6.1–8)
WBC # BLD: 14.1 THOU/MM3 (ref 4.8–10.8)

## 2021-11-01 PROCEDURE — 94761 N-INVAS EAR/PLS OXIMETRY MLT: CPT

## 2021-11-01 PROCEDURE — 2580000003 HC RX 258: Performed by: PHARMACIST

## 2021-11-01 PROCEDURE — 83735 ASSAY OF MAGNESIUM: CPT

## 2021-11-01 PROCEDURE — 2580000003 HC RX 258: Performed by: EMERGENCY MEDICINE

## 2021-11-01 PROCEDURE — 85014 HEMATOCRIT: CPT

## 2021-11-01 PROCEDURE — 99223 1ST HOSP IP/OBS HIGH 75: CPT | Performed by: INTERNAL MEDICINE

## 2021-11-01 PROCEDURE — 6360000002 HC RX W HCPCS: Performed by: FAMILY MEDICINE

## 2021-11-01 PROCEDURE — 85018 HEMOGLOBIN: CPT

## 2021-11-01 PROCEDURE — 6360000004 HC RX CONTRAST MEDICATION: Performed by: RADIOLOGY

## 2021-11-01 PROCEDURE — 2000000000 HC ICU R&B

## 2021-11-01 PROCEDURE — 36415 COLL VENOUS BLD VENIPUNCTURE: CPT

## 2021-11-01 PROCEDURE — 2580000003 HC RX 258: Performed by: FAMILY MEDICINE

## 2021-11-01 PROCEDURE — 85520 HEPARIN ASSAY: CPT

## 2021-11-01 PROCEDURE — 37799 UNLISTED PX VASCULAR SURGERY: CPT

## 2021-11-01 PROCEDURE — 2709999900 IR GUIDED NEPHROSTOMY CATH PLACEMENT LEFT

## 2021-11-01 PROCEDURE — C9113 INJ PANTOPRAZOLE SODIUM, VIA: HCPCS | Performed by: NURSE PRACTITIONER

## 2021-11-01 PROCEDURE — 94003 VENT MGMT INPAT SUBQ DAY: CPT

## 2021-11-01 PROCEDURE — 85025 COMPLETE CBC W/AUTO DIFF WBC: CPT

## 2021-11-01 PROCEDURE — 6360000002 HC RX W HCPCS: Performed by: NURSE PRACTITIONER

## 2021-11-01 PROCEDURE — 82803 BLOOD GASES ANY COMBINATION: CPT

## 2021-11-01 PROCEDURE — 2500000003 HC RX 250 WO HCPCS: Performed by: STUDENT IN AN ORGANIZED HEALTH CARE EDUCATION/TRAINING PROGRAM

## 2021-11-01 PROCEDURE — 6370000000 HC RX 637 (ALT 250 FOR IP): Performed by: FAMILY MEDICINE

## 2021-11-01 PROCEDURE — 6360000002 HC RX W HCPCS: Performed by: EMERGENCY MEDICINE

## 2021-11-01 PROCEDURE — 6360000002 HC RX W HCPCS: Performed by: PHARMACIST

## 2021-11-01 PROCEDURE — 94640 AIRWAY INHALATION TREATMENT: CPT

## 2021-11-01 PROCEDURE — 71250 CT THORAX DX C-: CPT

## 2021-11-01 PROCEDURE — 84100 ASSAY OF PHOSPHORUS: CPT

## 2021-11-01 PROCEDURE — 6370000000 HC RX 637 (ALT 250 FOR IP): Performed by: INTERNAL MEDICINE

## 2021-11-01 PROCEDURE — 80053 COMPREHEN METABOLIC PANEL: CPT

## 2021-11-01 PROCEDURE — 0T9430Z DRAINAGE OF LEFT KIDNEY PELVIS WITH DRAINAGE DEVICE, PERCUTANEOUS APPROACH: ICD-10-PCS | Performed by: RADIOLOGY

## 2021-11-01 PROCEDURE — 50432 PLMT NEPHROSTOMY CATHETER: CPT | Performed by: RADIOLOGY

## 2021-11-01 PROCEDURE — 6360000002 HC RX W HCPCS: Performed by: STUDENT IN AN ORGANIZED HEALTH CARE EDUCATION/TRAINING PROGRAM

## 2021-11-01 PROCEDURE — 2700000000 HC OXYGEN THERAPY PER DAY

## 2021-11-01 PROCEDURE — 2500000003 HC RX 250 WO HCPCS: Performed by: FAMILY MEDICINE

## 2021-11-01 RX ADMIN — SODIUM CHLORIDE, PRESERVATIVE FREE 10 ML: 5 INJECTION INTRAVENOUS at 08:10

## 2021-11-01 RX ADMIN — POTASSIUM CHLORIDE 10 MEQ: 1500 TABLET, EXTENDED RELEASE ORAL at 08:10

## 2021-11-01 RX ADMIN — ASPIRIN 81 MG: 81 TABLET, FILM COATED ORAL at 08:10

## 2021-11-01 RX ADMIN — MIDAZOLAM 3 MG/HR: 5 INJECTION, SOLUTION INTRAMUSCULAR; INTRAVENOUS at 07:46

## 2021-11-01 RX ADMIN — FENTANYL CITRATE 75 MCG/HR: 50 INJECTION INTRAMUSCULAR; INTRAVENOUS at 13:17

## 2021-11-01 RX ADMIN — ALBUTEROL SULFATE 2.5 MG: 2.5 SOLUTION RESPIRATORY (INHALATION) at 17:50

## 2021-11-01 RX ADMIN — ALBUTEROL SULFATE 2.5 MG: 2.5 SOLUTION RESPIRATORY (INHALATION) at 05:55

## 2021-11-01 RX ADMIN — AMIODARONE HYDROCHLORIDE 0.5 MG/MIN: 1.8 INJECTION, SOLUTION INTRAVENOUS at 18:42

## 2021-11-01 RX ADMIN — HEPARIN SODIUM 22 UNITS/KG/HR: 10000 INJECTION, SOLUTION INTRAVENOUS at 07:50

## 2021-11-01 RX ADMIN — VANCOMYCIN HYDROCHLORIDE 750 MG: 1 INJECTION, POWDER, LYOPHILIZED, FOR SOLUTION INTRAVENOUS at 15:46

## 2021-11-01 RX ADMIN — SODIUM HYPOCHLORITE: 1.25 SOLUTION TOPICAL at 08:13

## 2021-11-01 RX ADMIN — ALLOPURINOL 300 MG: 300 TABLET ORAL at 08:11

## 2021-11-01 RX ADMIN — PANTOPRAZOLE SODIUM 40 MG: 40 INJECTION, POWDER, FOR SOLUTION INTRAVENOUS at 08:12

## 2021-11-01 RX ADMIN — Medication 4 MCG/MIN: at 13:17

## 2021-11-01 RX ADMIN — AMIODARONE HYDROCHLORIDE 0.5 MG/MIN: 1.8 INJECTION, SOLUTION INTRAVENOUS at 04:58

## 2021-11-01 RX ADMIN — POTASSIUM CHLORIDE 10 MEQ: 1500 TABLET, EXTENDED RELEASE ORAL at 17:21

## 2021-11-01 RX ADMIN — ALBUTEROL SULFATE 2.5 MG: 2.5 SOLUTION RESPIRATORY (INHALATION) at 11:32

## 2021-11-01 RX ADMIN — DEXAMETHASONE SODIUM PHOSPHATE 6 MG: 4 INJECTION, SOLUTION INTRA-ARTICULAR; INTRALESIONAL; INTRAMUSCULAR; INTRAVENOUS; SOFT TISSUE at 08:12

## 2021-11-01 RX ADMIN — CEFTRIAXONE SODIUM 1000 MG: 1 INJECTION, POWDER, FOR SOLUTION INTRAMUSCULAR; INTRAVENOUS at 00:03

## 2021-11-01 RX ADMIN — IOTHALAMATE MEGLUMINE 15 ML: 600 INJECTION INTRAVASCULAR at 14:57

## 2021-11-01 RX ADMIN — ALBUTEROL SULFATE 2.5 MG: 2.5 SOLUTION RESPIRATORY (INHALATION) at 23:50

## 2021-11-01 ASSESSMENT — PULMONARY FUNCTION TESTS
PIF_VALUE: 25
PIF_VALUE: 25
PIF_VALUE: 28
PIF_VALUE: 26
PIF_VALUE: 29

## 2021-11-01 NOTE — CONSULTS
[facility]          [facility]        [address]        [place]        [phone]           Inpatient Initial consult note         11/1/2021 12:02 PM    Patient Name:   Matthew Phoenix:    1950  Primary Care Physician:  Colleen Razo MD     History Obtained From:  electronic medical record     Consultation requested by : Osmin Phillips MD    requested for  : Evaluation of  worsening renal function     History of presentingillness   Svetlana Brian is a 73 y.o.   female with Past Medical History as stated below presented with a chief complaint of Shortness of Breath   on 10/27/2021 . Patient intubated and cannot accurately assess history or review of systems    Patient presented to the ER with chief complaints of shortness of breath which have been throughout the day on the date of admission. They apparently had a saturations of 51% at home she was having some mild headache no fever or chills. In the ED she had a saturation of 41% placed on the nonrebreather mask and subsequently chest x-ray revealed pulmonary vascular congestion and near complete opacification of the left hemithorax and pleural effusion she had ultrasound-guided thoracentesis and subsequently intubated. She was also hypotensive and has needed pressor support as well.   She is also getting antibiotics    She had a [place]-Kasia catheter placed and has been on Bumex drip which was stopped yesterday due to worsening renal function her CT scan of the abdomen did show some hydronephrosis with staghorn calculus and she is going for a left nephrostomy tube placement today     Past History      Past Medical History:   Diagnosis Date    CHF (congestive heart failure) ([facility] [place] [address]     Dr. Albert Person Depression     Gout attack     Hypertension     Osteoarthritis     Pulmonary artery hypertension ([facility] [place] [address]     [facility]-- Dr. Ward Alvarez-- Dr. Albert Person Renal calculi     Dr. Yanely Christian [facility]) Past Surgical History:   Procedure Laterality Date    APPENDECTOMY  1960    FACIAL NERVE SURGERY      1989    PRESSURE ULCER DEBRIDEMENT Right 8/6/2021    EXCISION RIGHT BUTTOCK WOUND AND CLOSURE performed by Christopher Thomas MD at [address] History     Socioeconomic History    Marital status: Single     Spouse name: Not on file    Number of children: 0    Years of education: Not on file    Highest education level: Not on file   Occupational History    Not on file   Tobacco Use    Smoking status: Never Smoker    Smokeless tobacco: Never Used   Vaping Use    Vaping Use: Never used   Substance and Sexual Activity    Alcohol use: No    Drug use: No    Sexual activity: Not Currently   Other Topics Concern    Not on file   Social History Narrative    Not on file     Social Determinants of Health     Financial Resource Strain: Low Risk     Difficulty of Paying Living Expenses: Not very hard   Food Insecurity: No Food Insecurity    Worried About Running Out of Food in the Last Year: Never true    Traci of Food in the Last Year: Never true   Transportation Needs:     Lack of Transportation (Medical):      Lack of Transportation (Non-Medical):    Physical Activity:     Days of Exercise per Week:     Minutes of Exercise per Session:    Stress:     Feeling of Stress :    Social Connections:     Frequency of Communication with Friends and Family:     Frequency of Social Gatherings with Friends and Family:     Attends Oriental orthodox Services:     Active Member of Clubs or Organizations:     Attends Club or Organization Meetings:     Marital Status:    Intimate Partner Violence:     Fear of Current or Ex-Partner:     Emotionally Abused:     Physically Abused:     Sexually Abused:      Family History   Problem Relation Age of Onset    Diabetes Father     Arthritis Mother     COPD Mother     Diabetes Sister     Heart Disease Maternal Uncle     Breast Cancer Niece 36    Sleep Apnea Brother     Asthma Neg Hx     Birth Defects Neg Hx     Cancer Neg Hx     Depression Neg Hx     Early Death Neg Hx     Hearing Loss Neg Hx     High Blood Pressure Neg Hx     High Cholesterol Neg Hx     Kidney Disease Neg Hx     Learning Disabilities Neg Hx     Mental Illness Neg Hx     Mental Retardation Neg Hx     Miscarriages / Stillbirths Neg Hx     Stroke Neg Hx     Substance Abuse Neg Hx     Vision Loss Neg Hx     Other Neg Hx      Medications & Allergies      Prior to Admission medications    Medication Sig Start Date End Date Taking? Authorizing Provider   metoprolol tartrate (LOPRESSOR) 25 MG tablet Take 0.5 tablets by mouth 2 times daily 10/25/21  Yes LANE Marmolejo CNP   sodium hypochlorite (DAKINS) 0.125 % SOLN external solution Apply Dakin's moistened gauze dressings to wound twice daily and as needed.  8/24/21  Yes LANE Leong CNP   sodium chloride 1 g tablet Take 1 tablet by mouth 2 times daily 8/3/21  Yes Rossana Jerry MD   allopurinol (ZYLOPRIM) 300 MG tablet Take 1 tablet by mouth daily 7/16/21  Yes Neeru Flores MD   FLUoxetine (PROZAC) 40 MG capsule Take 1 capsule by mouth daily 7/16/21  Yes Neeru Flores MD   potassium chloride (KLOR-CON M) 10 MEQ extended release tablet Take 1 tablet by mouth every other day 5/16/21  Yes LANE Alarcon CNP   bumetanide (BUMEX) 1 MG tablet Take 1 tablet by mouth daily 5/16/21  Yes LANE Alarcon CNP   Multiple Vitamins-Minerals (MULTIVITAMIN WOMEN PO) Take 1 tablet by mouth daily   Yes Historical Provider, MD   acetaminophen (TYLENOL) 650 MG extended release tablet Take 650 mg by mouth every 8 hours as needed for Pain   Yes Historical Provider, MD   Riociguat (ADEMPAS) 2.5 MG TABS Take 2.5 mg by mouth 3 times daily   Yes Historical Provider, MD   docusate sodium (COLACE) 100 MG capsule Take 100 mg by mouth as needed    Yes Historical Provider, MD   aspirin 81 MG tablet Take 81 mg by mouth daily    Yes Historical Provider, MD     Allergies: Patient has no known allergies. IP meds : Scheduled Meds:   pantoprazole  40 mg IntraVENous QAM    vancomycin  750 mg IntraVENous Q24H    potassium chloride  10 mEq Oral BID WC    dexamethasone  6 mg IntraVENous Daily    sodium hypochlorite   Irrigation Daily    vancomycin (VANCOCIN) intermittent dosing (placeholder)   Other RX Placeholder    Riociguat  2.5 mg Oral Q8H    sodium chloride flush  5-40 mL IntraVENous 2 times per day    allopurinol  300 mg Oral Daily    aspirin  81 mg Oral Daily    [Held by provider] FLUoxetine  40 mg Oral Daily    [Held by provider] metoprolol tartrate  12.5 mg Oral BID    albuterol  2.5 mg Nebulization Q6H    cefTRIAXone (ROCEPHIN) IV  1,000 mg IntraVENous Q24H     Continuous Infusions:   sodium chloride      fentaNYL (SUBLIMAZE) 1250 mcg in sodium chloride 0.9 % 250 mL 75 mcg/hr (10/31/21 1948)    sodium chloride 25 mL (10/30/21 0129)    amiodarone 0.5 mg/min (11/01/21 0458)    heparin (PORCINE) Infusion Stopped (11/01/21 0940)    norepinephrine 4 mcg/min (11/01/21 0612)    [Held by provider] sodium chloride 50 mL/hr at 10/28/21 2314    midazolam 3 mg/hr (11/01/21 0746)     Review of Systems Physical Exam   Review of Systems   Unable to perform ROS: Intubated    Physical Exam  Vitals reviewed. Constitutional:       General: She is not in acute distress. Appearance: She is ill-appearing. She is not diaphoretic. HENT:      Head: Normocephalic and atraumatic. Comments: Florence on the left side of the neck noted     Right Ear: External ear normal.      Left Ear: External ear normal.      Nose: Nose normal.      Mouth/Throat:      Mouth: Mucous membranes are moist.      Comments: ET tube in place  Eyes:      General: No scleral icterus. Right eye: No discharge. Left eye: No discharge. Conjunctiva/sclera: Conjunctivae normal.   Neck:      Thyroid: No thyromegaly. Vascular: No JVD. Cardiovascular:      Rate and Rhythm: Normal rate and regular rhythm. Heart sounds: Normal heart sounds. No murmur heard. Pulmonary:      Effort: Pulmonary effort is normal. No respiratory distress. Breath sounds: No stridor. No wheezing or rales. Comments: Very diminished breath sounds  Chest:      Chest wall: No tenderness. Abdominal:      General: Bowel sounds are normal. There is no distension. Palpations: Abdomen is soft. Tenderness: There is no abdominal tenderness. Musculoskeletal:         General: Swelling present. No tenderness. Cervical back: Normal range of motion and neck supple. Right lower leg: Edema present. Left lower leg: Edema present. Skin:     General: Skin is warm and dry. Findings: No erythema or rash. Neurological:      Comments: Not responding to verbal commands   Psychiatric:      Comments: Not agitated           Vitals:    11/01/21 1128   BP:    Pulse: 114   Resp:    Temp:    SpO2: 96%     Labs, Radiology and Tests       Recent Labs     10/31/21  0440 10/31/21  1740 11/01/21  0545   WBC 13.7* 11.9* 14.1*   RBC 2.73* 3.04* 3.04*   HGB 6.7* 7.8* 7.7*   HCT 23.2* 26.1* 25.5*   MCV 85.0 85.9 83.9   MCH 24.5* 25.7* 25.3*   MCHC 28.9* 29.9* 30.2*    186 189     Recent Labs     10/30/21  0530 10/30/21  0530 10/31/21  0440 10/31/21  1740 11/01/21  0545     --  137  --  134*   K 4.8   < > 4.6 4.3 4.7     --  103  --  100   CO2 22*  --  20*  --  19*   BUN 29*  --  36*  --  44*   CREATININE 1.8*  --  1.9*  --  2.1*   CALCIUM 8.8  --  8.8  --  8.6   PROT 5.6*  --  5.7*  --  5.2*   LABALBU 2.5*  --  2.6*  --  2.7*   BILITOT <0.2*  --  <0.2*  --  <0.2*   ALKPHOS 101  --  85  --  92   AST 16  --  11  --  22   ALT 8*  --  7*  --  10*    < > = values in this interval not displayed. Radiology : CT scan of the abdomen showed bilateral pleural effusions.   Large staghorn calculus on the left kidney severe left-sided hydronephrosis and hydroureter    Other : Old lab data have been reviewed and noted patient's baseline creatinine typically runs close to 1.1-1.2    Assessment    1 Renal -acute kidney injury, multifactorial etiology which includes hypotension from sepsis, IV contrast exposure on 10/29 and now has some hydronephrosis as well which may or may not be contributing at this time. ? At this time all the nephrotoxic agents are on hold on Levophed and blood pressure is mainly maintained. ? Making some urine output. For nephrostomy tube placement today  ? We will follow renal function closely    2 Electrolytes -mild hyponatremia  3 Mild acidosis follow for now  4 Acute hypercapnic respiratory failure  5 Pneumonia with pleural effusion  6 Pulmonary hypertension  7 Obstructive sleep apnea  8 Sepsis  9 Meds reviewed and discussed with nursing staff      **This report has been created using voice recognition software. It maycontain minor  errors which are inherent in voice recognition technology. **    Lea Esteves MD,M.D  [facility].

## 2021-11-01 NOTE — PROGRESS NOTES
LANE Larios - CNP  Urology Progress Note    Subjective: Velvet Massey is a 73 y.o. female. His/Her current Diet is: Diet NPO. Since the previous note,  No acute issues overnight. No fevers or chills. No nausea or vomiting.     Intubated/sedated  Plan for left nephrostomy tube today by IR    Vitals and Labs:  Patient Vitals for the past 24 hrs:   BP Temp Temp src Pulse Resp SpO2 Weight   11/01/21 1128 -- -- -- 114 -- 96 % --   11/01/21 1100 (!) 94/47 97.7 °F (36.5 °C) CORE 116 16 96 % --   11/01/21 1000 (!) 105/55 97.5 °F (36.4 °C) CORE 107 16 95 % --   11/01/21 0915 -- -- -- 128 16 95 % --   11/01/21 0900 (!) 99/57 -- -- 117 16 96 % --   11/01/21 0800 (!) 96/53 97.5 °F (36.4 °C) CORE 117 16 92 % --   11/01/21 0700 (!) 101/53 -- -- 122 16 98 % --   11/01/21 0608 -- -- -- 123 16 97 % 192 lb 3.9 oz (87.2 kg)   11/01/21 0600 115/65 97.3 °F (36.3 °C) CORE 115 14 91 % --   11/01/21 0556 -- -- -- -- 16 95 % --   11/01/21 0500 (!) 103/48 97.3 °F (36.3 °C) CORE 118 16 99 % --   11/01/21 0400 (!) 119/55 98.1 °F (36.7 °C) Oral 120 16 99 % --   11/01/21 0300 (!) 116/51 -- -- 73 16 99 % --   11/01/21 0216 -- -- -- 115 16 99 % --   11/01/21 0200 (!) 122/51 98.1 °F (36.7 °C) CORE 79 16 99 % --   11/01/21 0100 (!) 116/53 98.1 °F (36.7 °C) -- 70 14 90 % --   11/01/21 0000 (!) 106/42 98.1 °F (36.7 °C) CORE 77 16 91 % --   10/31/21 2300 (!) 105/38 97.9 °F (36.6 °C) CORE 81 16 92 % --   10/31/21 2200 (!) 105/44 97.7 °F (36.5 °C) CORE 82 16 94 % --   10/31/21 2100 (!) 113/47 97.7 °F (36.5 °C) CORE 88 16 94 % --   10/31/21 2000 (!) 117/47 97.2 °F (36.2 °C) CORE 84 16 93 % --   10/31/21 1957 -- -- -- 87 16 90 % --   10/31/21 1930 -- -- -- 83 16 93 % --   10/31/21 1900 (!) 114/50 -- -- 81 16 93 % --   10/31/21 1830 -- -- -- 80 16 93 % --   10/31/21 1800 (!) 100/38 -- -- 88 16 93 % --   10/31/21 1730 -- -- -- 97 16 98 % --   10/31/21 1700 (!) 117/46 -- -- 84 19 94 % --   10/31/21 1630 -- -- -- 89 16 94 % --   10/31/21 1600 (!) 116/45 97.2 °F (36.2 °C) CORE 83 16 94 % --   10/31/21 1538 -- -- -- 82 16 94 % --   10/31/21 1530 -- -- -- 81 16 94 % --   10/31/21 1500 (!) 118/50 -- -- 87 (!) 0 92 % --   10/31/21 1445 (!) 118/50 97.2 °F (36.2 °C) CORE 82 (!) 0 94 % --   10/31/21 1430 -- -- -- 83 (!) 0 94 % --   10/31/21 1400 (!) 122/45 -- -- 77 (!) 0 94 % --   10/31/21 1330 -- -- -- 85 (!) 0 94 % --   10/31/21 1309 -- -- -- 69 (!) 0 96 % --   10/31/21 1305 -- -- -- -- (!) 5 96 % --   10/31/21 1300 (!) 126/51 -- -- 72 (!) 3 95 % --   10/31/21 1230 (!) 127/49 -- -- 71 (!) 5 96 % --   10/31/21 1226 -- 96.8 °F (36 °C) CORE 73 9 96 % --   10/31/21 1200 (!) 120/47 96.8 °F (36 °C) Bladder 72 8 96 % --     I/O last 3 completed shifts: In: 4255 [I.V.:2399; Blood:310; NG/GT:504]  Out: 945 [Urine:945]    Recent Labs     10/31/21  0440 10/31/21  1740 11/01/21  0545   WBC 13.7* 11.9* 14.1*   HGB 6.7* 7.8* 7.7*   HCT 23.2* 26.1* 25.5*   MCV 85.0 85.9 83.9    186 189     Recent Labs     10/30/21  0530 10/30/21  0530 10/31/21  0440 10/31/21  1740 11/01/21  0545     --  137  --  134*   K 4.8   < > 4.6 4.3 4.7     --  103  --  100   CO2 22*  --  20*  --  19*   PHOS 4.8*  --  4.3  --  3.8   BUN 29*  --  36*  --  44*   CREATININE 1.8*  --  1.9*  --  2.1*    < > = values in this interval not displayed. No results for input(s): COLORU, PHUR, LABCAST, WBCUA, RBCUA, MUCUS, TRICHOMONAS, YEAST, BACTERIA, CLARITYU, SPECGRAV, LEUKOCYTESUR, UROBILINOGEN, Alben Prabha in the last 72 hours. Invalid input(s): NITRATE, GLUCOSEUKETONESUAMORPHOUS    Physical Exam:  Intubated/sedated  Neck is supple  Regular rate and rhythm. Normal peripheral pulses  No accessory muscles of inspiration. Symmetric chest rise  Abdomen soft, non-tender, non-distended. No CVA tenderness. No calf pain. Minimal/no edema in bilateral lower extremities. Skin is warm, dry  Psych:  Intubated/sedated    Additional Lab/Culture results:     Imaging Reviewed: LANE Sanderson CNP independently reviewed the images and verified the radiology reports from:    ECHO Complete 2D W Doppler W Color    Result Date: 10/28/2021  Transthoracic Echocardiography Report (TTE)  Demographics   Patient Name    Nedra Moore Gender               Female   MR #            [id number]     Race                                                  Ethnicity   Account #       [de-identified]     Room Number          0009   Accession       [id number]    Date of Study        10/28/2021  Number   Date of Birth   1950    Referring Physician  Eduardo Jameson MD   Age             73 year(s)    Deanna Chen MD                                Physician  Procedure Type of Study   TTE procedure:ECHOCARDIOGRAM COMPLETE 2D W DOPPLER W COLOR. Procedure Date Date: 10/28/2021 Start: 01:02 PM Study Location: Bedside Technical Quality: Limited visualization due to patient on ventilator. Indications:Evaluate pulmonary artery presures and Congestive heart failure. Additional Medical History:Hypertension, pulmonary hypertension, chronic kidney disease Patient Status: Routine Height: 57 inches Weight: 165 pounds BSA: 1.66 m^2 BMI: 35.71 kg/m^2 BP: 86/55 mmHg Allergies   - No Known Allergies. Conclusions   Summary  Ejection fraction was estimated at 60-65%. The right ventricular size was normal with normal systolic function and  wall thickness. There was trace tricuspid regurgitation. IVC size is dilated with reduced respiratory phasic changes (CVP~10-15  mmHg).    Signature   ----------------------------------------------------------------  Electronically signed by Theodore Solis MD (Interpreting  physician) on 10/28/2021 at 04:38 PM ----------------------------------------------------------------   Findings   Mitral Valve  The mitral valve structure was normal with normal leaflet separation. DOPPLER: The transmitral velocity was within the normal range with no  evidence for mitral stenosis. There was no evidence of mitral  regurgitation. Aortic Valve  The aortic valve was trileaflet with normal thickness and cuspal  separation. DOPPLER: Transaortic velocity was within the normal range with  no evidence of aortic stenosis. There was no evidence of aortic  regurgitation. Tricuspid Valve  The tricuspid valve structure was normal with normal leaflet separation. DOPPLER: There was no evidence of tricuspid stenosis. There was trace  tricuspid regurgitation. Pulmonic Valve  The pulmonic valve leaflets exhibited normal thickness, no calcification,  and normal cuspal separation. DOPPLER: The transpulmonic velocity was  within the normal range with no evidence for regurgitation. Left Atrium  Left atrial size was normal.   Left Ventricle  Normal left ventricular size and systolic function. There were no regional wall motion abnormalities. Wall thickness was within normal limits. Ejection fraction was estimated at 60-65%. Right Atrium  Right atrial size was normal.   Right Ventricle  The right ventricular size was normal with normal systolic function and  wall thickness. Pericardial Effusion  The pericardium was normal in appearance with a small, non-hemodynamically  significant pericardial effusion. Prominent pericardial fat pad. Pleural Effusion  No evidence of pleural effusion. Aorta / Great Vessels  IVC size is dilated with reduced respiratory phasic changes (CVP~10-15  mmHg).   M-Mode/2D Measurements & Calculations   LV Diastolic    LV Systolic Dimension: 3  AV Cusp Separation: 1.9 cmLA  Dimension: 4.5  cm                        Dimension: 3.1 cmAO Root  cm              LV Volume Diastolic: 69.0 Dimension: 2.9 cmLA Area: 14  LV FS:33.3 %    ml                        cm^2  LV PW           LV Volume Systolic: 27 ml  Diastolic: 1 cm LV EDV/LV EDV Index: 91.1  Septum          ml/55 m^2LV ESV/LV ESV  Diastolic: 1 cm Index: 27 FY/89 m^2       RV Diastolic Dimension: 3.2 cm                  EF Calculated: 70.4 %                                            LA/Aorta: 1.07                                            Ascending Aorta: 2.7 cm                                            LA volume/Index: 32.1 ml /19m^2  Doppler Measurements & Calculations   MV Peak E-Wave:     AV Peak Velocity: 153 LVOT Peak Velocity: 110 cm/s  80.4 cm/s           cm/s                  LVOT Mean Velocity: 66.7 cm/s  MV Peak A-Wave:     AV Peak Gradient:     LVOT Peak Gradient: 5 mmHgLVOT  65.1 cm/s           9.36 mmHg             Mean Gradient: 2 mmHg  MV E/A Ratio: 1.24  AV Mean Velocity: 116  MV Peak Gradient:   cm/s                  TV Peak E-Wave: 50.9 cm/s  2.59 mmHg           AV Mean Gradient: 6   TV Peak A-Wave: 56.9 cm/s                      mmHg  MV Deceleration     AV VTI: 30.5 cm       TV Peak Gradient: 1.04 mmHg  Time: 243 msec                            TR Velocity:276 cm/s  MV P1/2t: 71 msec                         TR Gradient:30.47 mmHg  MVA by PHT:3.1 cm^2 LVOT VTI: 19.7 cm     PV Peak Velocity: 90.8 cm/s                      IVRT: 70 msec         PV Peak Gradient: 3.3 mmHg                       AV DVI (VTI): 0.65AV  MD ED Velocity: 159 cm/s                      DVI (Vmax):0.72  [email or web address] [id number]    XR CHEST PORTABLE    Result Date: 10/28/2021  1 view chest x-ray. Comparison: CR,SR - XR CHEST PORTABLE - 10/27/2021 03:55 PM EDT Findings: Improvement in bilateral airspace disease. No pneumothorax or pleural effusion. No mediastinal shift or tracheal deviation. Stable cardiomegaly. Passive venous congestion. Osseous structures intact.  Tubes and catheters: ET tube 0.8 cm above avelina. NG tube is well within the body of the stomach. Right IJ catheter/sheath tip right atrium. Impression: 1. Persistent left lower lobe atelectasis or consolidation. 2. Cardiomegaly with passive venous congestion. 3. Supportive devices in situ. This document has been electronically signed by: Oneida Alexander MD on 10/28/2021 04:30 AM    XR CHEST PORTABLE    Result Date: 10/27/2021  PROCEDURE: XR CHEST PORTABLE CLINICAL INFORMATION: s/p Central Line, ett adjustment COMPARISON: No prior study. TECHNIQUE: AP portable chest radiograph performed. FINDINGS: The tip of the endotracheal tube terminates 9 mm above the avelina and should be adjusted. The tip of the right internal jugular central catheter terminates within the right atrium. The nasogastric tube terminates below the diaphragms. Increased interstitial pulmonary markings are seen bilaterally. Patchy opacities are seen in the right lung. The main pulmonary artery is dilated. Cardiac silhouette is enlarged. Small left pleural effusion. No pneumothorax. No acute bony abnormality. Degenerative changes of the thoracic spine. 1. The endotracheal tube terminates 9 mm above the avelina and should be withdrawn about 1-2 cm. Reimaging should be obtained. 2. Increased interstitial pulmonary markings are seen bilaterally. Patchy opacities seen in the right lung. 3. Mild cardiomegaly **This report has been created using voice recognition software. It may contain minor errors which are inherent in voice recognition technology. ** Final report electronically signed by Dr Vlad Ang on 10/27/2021 5:32 PM    XR CHEST PORTABLE    Result Date: 10/27/2021  PROCEDURE: XR CHEST PORTABLE CLINICAL INFORMATION: Postthoracentesis. COMPARISON: Chest x-ray 10/27/2021. TECHNIQUE: AP portable chest radiograph performed. FINDINGS: Lines/tubes: The endotracheal tube terminates approximately 18 mm above the level of the avelina.  The nasogastric catheter extends below the hemidiaphragms is distal tip in the projection of the left upper quadrant. Heart/mediastinum: Cardiomegaly is stable. The pulmonary vascularity is unremarkable. Lungs: The left pleural effusion has been drained. No pneumothorax is identified. Improved aeration is noted in the left hemithorax. A trace right pleural effusion is suspected. Bones: Diffuse osteopenia is observed. The visualized skeletal structures appear intact. Status post drainage of the left pleural effusion with improved aeration noted in the left hemithorax. No pneumothorax observed. Cardiomegaly is stable. Life support and monitoring devices are unchanged. **This report has been created using voice recognition software. It may contain minor errors which are inherent in voice recognition technology. ** Final report electronically signed by Dr Dominique Bojorquez on 10/27/2021 4:18 PM    XR CHEST PORTABLE    Result Date: 10/27/2021  PROCEDURE: XR CHEST PORTABLE CLINICAL INFORMATION: Endotracheal tube adjustment. COMPARISON: Chest x-ray 10/27/2021. TECHNIQUE: AP portable chest radiograph performed. FINDINGS: Lines/tubes: The endotracheal tube tip now terminates approximately 18 mm above the level of the avelina. The nasogastric catheter extends below the hemidiaphragms with tip in the projection of the left upper quadrant, incompletely visualized. Heart/mediastinum: Cardiomegaly is unchanged. Lungs: Improved aeration is noted in the left upper lobe. There continues to be left perihilar and left lower lobe consolidation. Improved aeration is noted in the right lower lobe. No pneumothorax is observed. Bones: The visualized skeletal structures appear intact. The endotracheal tube has been adjusted with tip now terminating approximately 18 mm above the level of the avelina. Improved aeration is noted in the left upper lobe and right lower lobe. Persistent dense consolidation in the left mid and lower lobe are observed.  **This report has been created using voice recognition software. It may contain minor errors which are inherent in voice recognition technology. ** Final report electronically signed by Dr Geovani Urena on 10/27/2021 3:41 PM    XR CHEST PORTABLE    Result Date: 10/27/2021  PROCEDURE: XR CHEST PORTABLE CLINICAL INFORMATION: Intubation. COMPARISON: Chest x-ray 10/27/2021. TECHNIQUE: AP portable chest radiograph performed. FINDINGS: Lines/tubes: The endotracheal tube tip terminates within the proximal right mainstem bronchus. A nasogastric catheter extends below the hemidiaphragm in the projection of the left upper quadrant. Heart/mediastinum: Cardiomegaly is stable. Lungs: Improved aeration is noted in the left hemithorax. Dense consolidation is seen in the left upper lobe and left lower lobe. Improved aeration is noted in the right lung base. No pneumothorax is observed. Bones: Diffuse osteopenia is present. The visualized skeletal structures appear intact. The endotracheal tube tip terminates in the right mainstem bronchus. Improved aeration is noted in the right lower lobe and left hemithorax. There continues to be dense consolidation in the left upper lobe and left lower lobe. Cardiomegaly is stable. COMMUNICATION: The results of this examination were discussed with Dr. Sebastian Reynoso at 3:15 PM on 10/27/2021. **This report has been created using voice recognition software. It may contain minor errors which are inherent in voice recognition technology. ** Final report electronically signed by Dr Geovani Urena on 10/27/2021 3:16 PM    XR CHEST PORTABLE    Result Date: 10/27/2021  PROCEDURE: XR CHEST PORTABLE CLINICAL INFORMATION: Shortness of breath. COMPARISON: Chest x-ray 7/15/2020. TECHNIQUE: AP portable chest radiograph performed. FINDINGS: Lines/tubes: Monitoring devices overlie the chest. Heart/mediastinum: The heart size and mediastinal contours are obscured. Pulmonary vascular congestion is observed.  Lungs: Near complete opacification of the left hemithorax with very little aerated lung in the left upper lobe is observed. A small right pleural effusion with right lower lobe consolidation obscures visualization of the right hemidiaphragm. No pneumothorax is identified. Bones: Diffuse osteopenia is present. The visualized skeletal structures appear intact. Near complete opacification of the left hemithorax with very little aerated lung visualized in the left upper lobe. Small right pleural effusion and right lower lobe consolidation obscures visualization of the right hemidiaphragm pulmonary vascular congestion is observed. **This report has been created using voice recognition software. It may contain minor errors which are inherent in voice recognition technology. ** Final report electronically signed by Dr Holly Reese on 10/27/2021 2:20 PM    US THORACENTESIS Which side should the procedure be performed? Left    Result Date: 10/27/2021  THORACENTESIS WITH ULTRASOUND GUIDANCE: PERFORMED BY: Kaushik Adler M.D. CLINICAL INFORMATION: Pleural effusion left side. APPROACH: Left hemithorax, inferolateral, patient in right side down decubitus position. . Multiple permanent sonographic images were obtained during procedure for documentation. FLUID WITHDRAWN: 1100 mL hickman serous fluid ESTIMATED BLOOD LOSS: Minimal PROCEDURE: Signed informed consent was obtained prior to performing this procedure. The thorax was initially evaluated sonographically to determine appropriate puncture site. The skin was marked, prepped, and draped in a sterile fashion. Following local anesthesia and utilizing aseptic technique, a 5 Polish one-step catheter was successfully inserted into the pleural effusion at the position indicated above. Pleural fluid in the amount was above was then aspirated and the needle was removed. The patient tolerated the procedure well. A post procedure chest radiograph will be obtained.      Status post thoracentesis **This report has been created using voice recognition software. It may contain minor errors which are inherent in voice recognition technology. ** Final report electronically signed by Dr. Annemarie Rodríguez on 10/27/2021 4:33 PM      Impression:    Patient Active Problem List   Diagnosis    HTN (hypertension)    Chronic depression    CHF (congestive heart failure) (HCC)    Thrombocytopenia (HCC)    Moderate episode of recurrent major depressive disorder (HCC)    Renal failure syndrome    Hyperkalemia    Isolated non-nephrotic proteinuria    ALVIN [facility]acute [facility] injury) ([facility] [address]    Chronic right-sided heart failure (HCC)    Pulmonary HTN (HCC)    Hypotension due to hypovolemia    Acute renal failure superimposed on stage 4 chronic kidney disease (HCC)    Pressure ulcer of right buttock, stage 3 (HCC)    Acute respiratory failure (HCC)    Flash pulmonary edema (HCC)         IMPRESSION/Plan:  Left staghorn calculus  Resp failure  PNA vs atelectasis  Left pleural effusion  HFpEF  Afib  BARBER    Hold anticoagulants per IR protocol, shes NPO  Ct shows Large staghorn calculus in the left kidney. Severe left-sided hydronephrosis and hydroureter. Increased density in the left perinephric fat consistent with inflammatory process.   Order for IR to place left nephrostomy tube, discussed with IR this AM  Reviewed images with Dr Joe Lee, APRN - CNP  11:58 AM 11/1/2021

## 2021-11-01 NOTE — PROGRESS NOTES
Gastroenterology Progress Note:     Patient Name:  Alcides Lopez   MRN: [id number]  [id number]  YOB: 1950  Admit Date: 10/27/2021  1:36 PM  Primary Care Physician: Verona Gresham MD   [id number]/[id number]-A     Patient seen and examined. 24 hours events and chart reviewed. Subjective: Patient is intubated & sedated, RN present. LFTs remain unremarkable. Hgb 7.7 Per RN, no GI bleeding noted    Objective:  BP (!) 121/57   Pulse 111   Temp 96.3 °F (35.7 °C) (Core)   Resp 16   Ht 4' 9\" (1.448 m)   Wt 192 lb 3.9 oz (87.2 kg)   SpO2 95%   BMI 41.60 kg/m²     Physical Exam:    General:  Acutely ill appearing female, intubated & sedated  HEENT: Atraumatic, normocephalic. Dry oral mucous membranes. OG tube in place. Neck: Supple without adenopathy, JVD, thyromegaly or masses. Trachea midline. CV: Heart RRR, no murmurs, rubs, gallops. Resp: Even, easy without cough or accessory use. Lungs clear to ascultation bilaterally. Abd: Round, soft, obese, nontender. No hepatosplenomegaly or mass present. Active bowel sounds heard. No distention noted. Ext:  Without cyanosis, clubbing. BLE edema.    Skin: Pink, warm, dry  Neuro:  Intubated & sedated       Rectal: deferred    Labs:   CBC:   Lab Results   Component Value Date    WBC 14.1 11/01/2021    HGB 7.7 11/01/2021    HCT 25.5 11/01/2021    MCV 83.9 11/01/2021     11/01/2021     BMP:   Lab Results   Component Value Date     11/01/2021    K 4.7 11/01/2021    K 5.3 10/27/2021     11/01/2021    CO2 19 11/01/2021    PHOS 3.8 11/01/2021    BUN 44 11/01/2021    CREATININE 2.1 11/01/2021    CALCIUM 8.6 11/01/2021     PT/INR:   Lab Results   Component Value Date    INR 1.24 10/28/2021     Lipids:   Lab Results   Component Value Date    ALKPHOS 92 11/01/2021    ALT 10 11/01/2021    AST 22 11/01/2021    BILITOT <0.2 11/01/2021    BILIDIR 0.3 03/06/2020    LABALBU 2.7 11/01/2021     Significant Diagnostic Studies:   CT chest 11/01/21  Impression     1. Moderate bilateral pleural effusions have increased in size in the interval with associated adjacent atelectasis. 2. Persistent pulmonary vascular congestion/edema with cardiomegaly. 3. Decreased pericardial effusion. Current Meds:  Scheduled Meds:   pantoprazole  40 mg IntraVENous QAM    vancomycin  750 mg IntraVENous Q24H    potassium chloride  10 mEq Oral BID WC    dexamethasone  6 mg IntraVENous Daily    sodium hypochlorite   Irrigation Daily    vancomycin (VANCOCIN) intermittent dosing (placeholder)   Other RX Placeholder    Riociguat  2.5 mg Oral Q8H    sodium chloride flush  5-40 mL IntraVENous 2 times per day    allopurinol  300 mg Oral Daily    aspirin  81 mg Oral Daily    [Held by provider] FLUoxetine  40 mg Oral Daily    [Held by provider] metoprolol tartrate  12.5 mg Oral BID    albuterol  2.5 mg Nebulization Q6H    cefTRIAXone (ROCEPHIN) IV  1,000 mg IntraVENous Q24H     Continuous Infusions:   sodium chloride      fentaNYL (SUBLIMAZE) 1250 mcg in sodium chloride 0.9 % 250 mL 75 mcg/hr (11/01/21 1317)    sodium chloride 25 mL (10/30/21 0129)    amiodarone 0.5 mg/min (11/01/21 0458)    heparin (PORCINE) Infusion Stopped (11/01/21 0940)    norepinephrine 4 mcg/min (11/01/21 1317)    [Held by provider] sodium chloride 50 mL/hr at 10/28/21 2314    midazolam 3 mg/hr (11/01/21 0746)       Assessment:  72 yo F admitted 10/27/21 for SOB. Noted to be hypoxemic & hypercapneic, emergent intubation done. Noted to have left pleural effusion, underwent thoracentesis with 1100 mL drained. Noted to have pneumonia, on ATBs. Noted to have ALVIN on CKD, nephro following. Noted to have afib with RVR, started on Amio & Heparin. CT chest noted a dilated CBD at 10 mm & large gallstone present therefore GI was consulted. US RUQ demonstrated cholelithiasis & pericholecystic fluid suspicious for acute cholecystitis, ductal dilatation. LFTs WNL. 1. Cholelithiasis- noted on imaging  2. Questionable CBD dilatation- LFTs WNL  3. Acute hypoxic respiratory failure- intubated  4. ALVIN on CKD  5. Left pleural effusion s/p thoracentesis  6. PNA- on ATBs  7. MERCEDES  8. Afib- on Amio & Heparin  9. Acute on chronic anemia- no GI bleeding noted, s/p 1 unit PRBC  10. Left staghorn calculus noted on imaging s/p nephrostomy tube  11.  CHF    Plan:     Monitor H & H, transfuse prn   Continue PPI daily   Do not suspect choledocholithiasis, LFTs WNL   If concern for acute cholecystitis, recommend HIDA scan and possible surgical consult   Nursing to monitor stool output & document   ATBs per primary  [facility] on board   ICU supportive care  Will follow intermittently     Case reviewed and impression/plan reviewed in collaboration with Dr. Lesli Cantu  Electronically signed by LANE Newman - CNP on 11/1/2021 at 3:55 PM    [facility]     Very acutely ill patient requiring an ICU bed  High complexity decision making

## 2021-11-01 NOTE — PROGRESS NOTES
1640 Called IR, spoke with radiologist TOSHIA. He was unaware of need for nephrostomy tube. will plan  Placement for tomorrow. Restarted heparin.  notified of above. 1730 coccyx dressing changed. 1950 report to oncoming nurse.

## 2021-11-01 NOTE — OP NOTE
Department of Radiology  Post Procedure Progress Note      Pre-Procedure Diagnosis:  Staghorn calculus left side, hydronephrosis    Procedure Performed:  Nephrostomy tube insertion    Anesthesia: local     Findings: successful    Immediate Complications:  None    Estimated Blood Loss: minimal    SEE DICTATED PROCEDURE NOTE FOR COMPLETE DETAILS.     Samia Suárez MD   11/1/2021 2:38 PM

## 2021-11-01 NOTE — PROGRESS NOTES
Comprehensive Nutrition Assessment    Type and Reason for Visit:  Initial, Consult (TF)    Nutrition Recommendations/Plan:   Pt NPO. TF stopped 10/31 d/t plan nephrosotmy tube today & note GI following ? acute GB. When able to restart TF, plan  Nepro TF  as tolerated to goal of 30 ml/hr. Discussed pt with [facility]  Free H20 flush per. Note significant weight change. Continue to monitor weight. Monitor ability for bm. No bm recorded yet this admit ( day 5). Nutrition Assessment:     Pt. nutritionally compromised AEB NPO status, need for TF stop 10/31 d/t plan nephrosotmy tube today & note GI following ? acute GB. At risk for further nutrition compromise r/t admitted with acute respiratory failure, intubated 10/27, AFib, ALVIN, stage 3 sacral wound, anemia and underlying medical condition (CKD, s/p ID of buttock wound 8/6/21,CHF, gout, pulmonary HTN, obesity ). Nutrition recommendations/interventions as per above  Malnutrition Assessment:  Malnutrition Status: At risk for malnutrition (Comment)    Context:  Acute Illness     Findings of the 6 clinical characteristics of malnutrition:  Energy Intake:  Unable to assess  Weight Loss:   (5.8% weight loss in 3.5 months)     Body Fat Loss:  No significant body fat loss     Muscle Mass Loss:  Unable to assess    Fluid Accumulation:  1 - Mild     Strength:  Not Performed    Estimated Daily Nutrient Needs:  Energy (kcal):  ~1127 kcals (15); Weight Used for Energy Requirements:  Admission (75.1 kg)     Protein (g):  ~59 grams ( 1.4) monitor reanl status; Weight Used for Protein Requirements:  Ideal (42.3 kg)        Fluid (ml/day):  per ;       Nutrition Related Findings:      Pt  intubated 10/27. TF stopped 10/31 d/t plan nephrosotmy tube today & note GI following ? acute GB. Note pt did receive 63% of prescribed TF yesterday until need for hold. No bm recorded yet this admit. Per Luciana Blackmon RN no bm yet today & discussed none recorded yet this admit.   10/31 hgb 6.9, currently 7.7. BUN 44, Cr 2.1, glucose 132, WBC 14. 1. meds include versed, levophed, decadron, ATB. Bumex  Stopped. Wounds:  Stage III (on coccyx per RN)       Current Nutrition Therapies:    NPO    Anthropometric Measures:  · Height: 4' 9\" (144.8 cm)  · Current Body Weight: 192 lb 3.9 oz (87.2 kg) (11/1 + 1 edema  ( note significant weight increase ))   · Admission Body Weight: 165 lb 9.1 oz (75.1 kg) (10/27 +1 edema)    · Usual Body Weight: 175 lb 11.3 oz (79.7 kg) (per EMR 7/16/21)     · Ideal Body Weight: 85 lbs; % Ideal Body Weight 194.8 %   · BMI: 41.6  · Adjusted Body Weight:  ;  (IBW ~93 #)   · BMI Categories: Obese Class 2 (BMI 35.0 -39.9) (on admit)       Nutrition Diagnosis:   · Inadequate oral intake related to impaired respiratory function as evidenced by intubation      Nutrition Interventions:   Food and/or Nutrient Delivery:   (when able to restart TF, recommend restart Nepro)  Nutrition Education/Counseling:  Education not appropriate   Coordination of Nutrition Care:  Continue to monitor while inpatient, Interdisciplinary Rounds    Goals:  TF to provide % of nutrient needs while pt is intubated. Nutrition Monitoring and Evaluation:   Behavioral-Environmental Outcomes:  None Identified   Food/Nutrient Intake Outcomes:  Enteral Nutrition Intake/Tolerance  Physical Signs/Symptoms Outcomes:  Biochemical Data, GI Status, Nausea or Vomiting, Fluid Status or Edema, Hemodynamic Status, Nutrition Focused Physical Findings, Skin, Weight     Discharge Planning:     Too soon to determine     Electronically signed by Rolando Clifton RD, LD on 11/1/21 at 11:51 AM EDT    Contact: [phone]

## 2021-11-01 NOTE — PROGRESS NOTES
1336 Patient received in IR for left nephrostomy tube insertion with conscious sedation. 1338 This procedure has been fully reviewed with the patient and written informed consent has been obtained. 1425 Procedure started with Dr. Dennis Castaneda. 1438 Procedure completed; patient tolerated well. Sutures, split gauze, tape applied; no bleeding noted. 1449 Patient on bed; comfort ensured. 1450 Patient taken to CT scan via bed.

## 2021-11-01 NOTE — FLOWSHEET NOTE
I met Kenzie's family outside her room in ICU 4D. She is getting ready to go to surgery and I offered prayer for her. 11/01/21 1432   Encounter Summary   Services provided to: Patient; Family   Referral/Consult From: [facility] Family members   Place of [address] [place] [address] Visiting Yes  (11/1 pre surgery)   Complexity of Encounter Low   Length of Encounter 15 minutes   Spiritual/Advent   Type Spiritual support   Care Plan:  Continue spiritual and emotional care for patient and family. Including prayers.

## 2021-11-01 NOTE — H&P
Formulation and discussion of sedation / procedure plans, risks, benefits, side effects and alternatives with patient and/or responsible adult completed.     Electronically signed by Rosaline Hill MD on 11/1/2021 at 2:38 PM

## 2021-11-01 NOTE — PROGRESS NOTES
CRITICAL CARE PROGRESS NOTE      Patient:  Velvet Massey    Unit/Bed:4D-09/009-A  YOB: 1950  MRN: [id number]   PCP: Adeline Erazo MD  Date of Admission: 10/27/2021  Chief Complaint:- Shortness of breath    Assessment and Plan:    1. Acute combined hypercapnic and hypoxic respiratory failure: Secondary to severe pneumonia with left-sided pleural effusion. Currently on pressure control ventilation with Pressure control 26 PEEP FiO2 60%  2. Severe bilateral multiorganism pneumonia: Culture positive for MSSA and adenovirus. Day 5 Vancomycin and Rocephin. Culture pansensitive. Nafcillin for pathogen directed therapy 11/1/21. 3. Septic Shock: 2/2 severe PNA. CI 6.3 Currently on pressor support w/ levophed  4. Left sided Pleural Effusion:  2/2 PNA per above  5. Atrial fibrillation with rapid ventricular response: Currently on heparin and amio drip will titrate for HR less than 120. Holding metoprolol 2/2 hypotension  6. PAH/chronic RV failure NYHA II: EF 55 to 60% G2 DD TTE 5/4/2021. RHC present. Darlene Massy catheter shows improvement in PA pressures MPAP <32. Treated w/ Riociguat. 7. Chronic tobacco use:   cessation  8. Hydronephrosis 2/2 Left-sided staghorn calculi: Urology following. Plan for percutaneous drainage tube today via IR. 9. MERCEDES: s/p 1 unit PRBC  10. Stage II ALVIN on CKD 3: Suspect secondary to staghorn calculi. Urology following. Plan for nephrostomy tubes per above. Positive RYLAN screen suggests underlying autoimmune etiology. GBM antibodies negative, C3/C4 levels normal, elevated kappa/lambda free light chains with normal ratio  11. Cholelithiasis: w/ dilated CBD. Noted on 10/30/2021 US liver/GB GI following  12. Stage III decubitus ulcer with inferior tunneling: S/p excision VAC placement 8/21. Low suspicion for active infection  13. Mild AS: Noted on previous TTE not appreciated on repeat imaging  14. Gout:  W/o exacerbation. Continue allopurinol  15.  BARBER: non compliant w/ CPAP  16. Hyperkalemia (resolved): 2/2 ALVIN      INITIAL H AND P AND ICU COURSE:  71F admitted [facility] 10/27/[facility] w/ SOB. Current smoker w/ PMH PAH grp 3, HFpEF, stage III sacral ulcer s/p wound ostomy 9/21. Patient sister reports SpO2 51% at home and was brought to ED where ABG showed pH 7.19, PCO2 80, PO2 134. She required emergent intubation and was transferred to ICU. Workup revealed left sided pleural effusion and underwent US guided thoracentesis w/ 1100 cc removed consistent w/ MRSA/adenovirus. Patient was noted to be in afib/RVR and was placed on amio, heparin drip. Patient was also noted to have normocytic anemia and received 1 unit PRBC 10/31/21. CT abdomen revealed CBD dilation w/ cholelithiasis. 11/1/2021: Plan for nephrostomy tubes today. Hemoglobin 7.7 s/p 1 unit PRBC yesterday. Weaned to 4 mics Levophed       Past Medical History:  Per HPI. Family History:  DM in father and sister. Social History: chronic smoker.     ROS   Unable to obtain a comprehensive ROS as patient sedated and intubated     Scheduled Meds:   pantoprazole  40 mg IntraVENous QAM    vancomycin  750 mg IntraVENous Q24H    potassium chloride  10 mEq Oral BID WC    dexamethasone  6 mg IntraVENous Daily    sodium hypochlorite   Irrigation Daily    vancomycin (VANCOCIN) intermittent dosing (placeholder)   Other RX Placeholder    Riociguat  2.5 mg Oral Q8H    sodium chloride flush  5-40 mL IntraVENous 2 times per day    allopurinol  300 mg Oral Daily    aspirin  81 mg Oral Daily    [Held by provider] FLUoxetine  40 mg Oral Daily    [Held by provider] metoprolol tartrate  12.5 mg Oral BID    albuterol  2.5 mg Nebulization Q6H    cefTRIAXone (ROCEPHIN) IV  1,000 mg IntraVENous Q24H     Continuous Infusions:   sodium chloride      fentaNYL (SUBLIMAZE) 1250 mcg in sodium chloride 0.9 % 250 mL 75 mcg/hr (10/31/21 1948)    sodium chloride 25 mL (10/30/21 0129)    amiodarone 0.5 mg/min (11/01/21 7950)    heparin (PORCINE) Infusion Stopped (10/31/21 1322)    norepinephrine 5 mcg/min (11/01/21 2843)    [Held by provider] sodium chloride 50 mL/hr at 10/28/21 2314    midazolam 4 mg/hr (11/01/21 0109)       PHYSICAL EXAMINATION:  T:  95.9.  P:  111. RR:  16. B/P:  100/61. FiO2:  55. O2 Sat:  95%. I/O:  513/368  Body mass index is 40.41 kg/m². GCS:   8  PC: 26/6: TV: 450: RRTotal: 16: Ti:1 sec  General: Acute on chronically ill-appearing elderly white female  HEENT:  normocephalic and atraumatic. No scleral icterus. PERR  Neck: supple. No Thyromegaly. Lungs: clear to auscultation. No retractions  Cardiac: RRR. No JVD. Abdomen: soft. Nontender. Extremities:  No clubbing, cyanosis, or edema x 4. Vasculature: capillary refill < 3 seconds. Palpable dorsalis pedis pulses. Skin:  warm and dry. Psych: Unable to assess as patient is currently sedated and on mechanical ventilation  Lymph:  No supraclavicular adenopathy. Neurologic:  No focal deficit. No seizures. Lines  CVC placed 10/27/2021  [place] catheter placed 10/29/2021  [place] placed 10/27/2021  Intubated 10/27/2021  Arterial line 10/29/2021    Data: (All radiographs, tracings, PFTs, and imaging are personally viewed and interpreted unless otherwise noted).  Sodium 134 potassium 4.7 chloride 100 bicarb 19 BUN/creatinine 44/2.1 GFR 23 magnesium 1.7 glucose 132 calcium 8.6 phosphorus 3.8   Albumin 2.7 alk phos 92 ALT/AST 1210/22 bilirubin 0.2 total protein 5.2   WBC 14.1 H&H 7.7/25.5   Telemetry shows   Chest x-ray 10/31/2021 demonstrated diffuse bilateral patchy opacities with left-sided pleural effusion and cardiomegaly   Hemodynamics CI 6.3, SVRI 770, CVP 21, PAP 53/29        Seen with multidisciplinary ICU team.  Meets Continued ICU Level Care Criteria:    [x] Yes   [] No - Transfer Planned to listed location:  [] HOSPITALIST CONTACTED- DR     Case and plan discussed with Dr. Dona Saravia.         Electronically signed by Rashida Mondragon DO PGY-2  CRITICAL CARE SPECIALIST

## 2021-11-02 LAB
ALBUMIN SERPL-MCNC: 2.9 G/DL (ref 3.5–5.1)
ALP BLD-CCNC: 95 U/L (ref 38–126)
ALT SERPL-CCNC: 14 U/L (ref 11–66)
ANION GAP SERPL CALCULATED.3IONS-SCNC: 15 MEQ/L (ref 8–16)
AST SERPL-CCNC: 22 U/L (ref 5–40)
BASOPHILS # BLD: 0.1 %
BASOPHILS ABSOLUTE: 0 THOU/MM3 (ref 0–0.1)
BILIRUB SERPL-MCNC: < 0.2 MG/DL (ref 0.3–1.2)
BLOOD CULTURE, ROUTINE: NORMAL
BLOOD CULTURE, ROUTINE: NORMAL
BUN BLDV-MCNC: 50 MG/DL (ref 7–22)
CALCIUM SERPL-MCNC: 9.1 MG/DL (ref 8.5–10.5)
CHLORIDE BLD-SCNC: 99 MEQ/L (ref 98–111)
CO2: 20 MEQ/L (ref 23–33)
CREAT SERPL-MCNC: 2.2 MG/DL (ref 0.4–1.2)
EOSINOPHIL # BLD: 0 %
EOSINOPHILS ABSOLUTE: 0 THOU/MM3 (ref 0–0.4)
ERYTHROCYTE [DISTWIDTH] IN BLOOD BY AUTOMATED COUNT: 17.2 % (ref 11.5–14.5)
ERYTHROCYTE [DISTWIDTH] IN BLOOD BY AUTOMATED COUNT: 17.3 % (ref 11.5–14.5)
ERYTHROCYTE [DISTWIDTH] IN BLOOD BY AUTOMATED COUNT: 51.6 FL (ref 35–45)
ERYTHROCYTE [DISTWIDTH] IN BLOOD BY AUTOMATED COUNT: 52.7 FL (ref 35–45)
GFR SERPL CREATININE-BSD FRML MDRD: 22 ML/MIN/1.73M2
GLUCOSE BLD-MCNC: 130 MG/DL (ref 70–108)
HCT VFR BLD CALC: 27.3 % (ref 37–47)
HCT VFR BLD CALC: 27.4 % (ref 37–47)
HEMOGLOBIN: 8.1 GM/DL (ref 12–16)
HEMOGLOBIN: 8.3 GM/DL (ref 12–16)
HEPARIN UNFRACTIONATED: 0.29 U/ML (ref 0.3–0.7)
HEPARIN UNFRACTIONATED: 0.57 U/ML (ref 0.3–0.7)
HEPARIN UNFRACTIONATED: 0.62 U/ML (ref 0.3–0.7)
IMMATURE GRANS (ABS): 0.13 THOU/MM3 (ref 0–0.07)
IMMATURE GRANULOCYTES: 1 %
LYMPHOCYTES # BLD: 3.4 %
LYMPHOCYTES ABSOLUTE: 0.4 THOU/MM3 (ref 1–4.8)
MAGNESIUM: 1.8 MG/DL (ref 1.6–2.4)
MCH RBC QN AUTO: 25.1 PG (ref 26–33)
MCH RBC QN AUTO: 25.5 PG (ref 26–33)
MCHC RBC AUTO-ENTMCNC: 29.6 GM/DL (ref 32.2–35.5)
MCHC RBC AUTO-ENTMCNC: 30.4 GM/DL (ref 32.2–35.5)
MCV RBC AUTO: 83.7 FL (ref 81–99)
MCV RBC AUTO: 84.8 FL (ref 81–99)
MONOCYTES # BLD: 5.2 %
MONOCYTES ABSOLUTE: 0.7 THOU/MM3 (ref 0.4–1.3)
NUCLEATED RED BLOOD CELLS: 0 /100 WBC
PHOSPHORUS: 3.6 MG/DL (ref 2.4–4.7)
PLATELET # BLD: 166 THOU/MM3 (ref 130–400)
PLATELET # BLD: 182 THOU/MM3 (ref 130–400)
PMV BLD AUTO: 9.1 FL (ref 9.4–12.4)
PMV BLD AUTO: 9.5 FL (ref 9.4–12.4)
POTASSIUM SERPL-SCNC: 4.8 MEQ/L (ref 3.5–5.2)
RBC # BLD: 3.23 MILL/MM3 (ref 4.2–5.4)
RBC # BLD: 3.26 MILL/MM3 (ref 4.2–5.4)
SEG NEUTROPHILS: 90.3 %
SEGMENTED NEUTROPHILS ABSOLUTE COUNT: 11.6 THOU/MM3 (ref 1.8–7.7)
SODIUM BLD-SCNC: 134 MEQ/L (ref 135–145)
TOTAL PROTEIN: 5.3 G/DL (ref 6.1–8)
VANCOMYCIN RANDOM: 32.5 UG/ML (ref 0.1–39.9)
WBC # BLD: 12.8 THOU/MM3 (ref 4.8–10.8)
WBC # BLD: 13.5 THOU/MM3 (ref 4.8–10.8)

## 2021-11-02 PROCEDURE — 6370000000 HC RX 637 (ALT 250 FOR IP): Performed by: FAMILY MEDICINE

## 2021-11-02 PROCEDURE — 94640 AIRWAY INHALATION TREATMENT: CPT

## 2021-11-02 PROCEDURE — 99233 SBSQ HOSP IP/OBS HIGH 50: CPT | Performed by: INTERNAL MEDICINE

## 2021-11-02 PROCEDURE — 2500000003 HC RX 250 WO HCPCS: Performed by: STUDENT IN AN ORGANIZED HEALTH CARE EDUCATION/TRAINING PROGRAM

## 2021-11-02 PROCEDURE — 84100 ASSAY OF PHOSPHORUS: CPT

## 2021-11-02 PROCEDURE — 94003 VENT MGMT INPAT SUBQ DAY: CPT

## 2021-11-02 PROCEDURE — C9113 INJ PANTOPRAZOLE SODIUM, VIA: HCPCS | Performed by: NURSE PRACTITIONER

## 2021-11-02 PROCEDURE — 2580000003 HC RX 258: Performed by: FAMILY MEDICINE

## 2021-11-02 PROCEDURE — 99232 SBSQ HOSP IP/OBS MODERATE 35: CPT | Performed by: UROLOGY

## 2021-11-02 PROCEDURE — 87086 URINE CULTURE/COLONY COUNT: CPT

## 2021-11-02 PROCEDURE — 85027 COMPLETE CBC AUTOMATED: CPT

## 2021-11-02 PROCEDURE — 2580000003 HC RX 258: Performed by: EMERGENCY MEDICINE

## 2021-11-02 PROCEDURE — 36415 COLL VENOUS BLD VENIPUNCTURE: CPT

## 2021-11-02 PROCEDURE — 85025 COMPLETE CBC W/AUTO DIFF WBC: CPT

## 2021-11-02 PROCEDURE — 85520 HEPARIN ASSAY: CPT

## 2021-11-02 PROCEDURE — 6360000002 HC RX W HCPCS: Performed by: NURSE PRACTITIONER

## 2021-11-02 PROCEDURE — 83735 ASSAY OF MAGNESIUM: CPT

## 2021-11-02 PROCEDURE — 6360000002 HC RX W HCPCS: Performed by: FAMILY MEDICINE

## 2021-11-02 PROCEDURE — 6360000002 HC RX W HCPCS: Performed by: EMERGENCY MEDICINE

## 2021-11-02 PROCEDURE — 99291 CRITICAL CARE FIRST HOUR: CPT | Performed by: INTERNAL MEDICINE

## 2021-11-02 PROCEDURE — 6370000000 HC RX 637 (ALT 250 FOR IP): Performed by: STUDENT IN AN ORGANIZED HEALTH CARE EDUCATION/TRAINING PROGRAM

## 2021-11-02 PROCEDURE — 2000000000 HC ICU R&B

## 2021-11-02 PROCEDURE — 80053 COMPREHEN METABOLIC PANEL: CPT

## 2021-11-02 PROCEDURE — 2580000003 HC RX 258: Performed by: STUDENT IN AN ORGANIZED HEALTH CARE EDUCATION/TRAINING PROGRAM

## 2021-11-02 PROCEDURE — 80202 ASSAY OF VANCOMYCIN: CPT

## 2021-11-02 PROCEDURE — 6360000002 HC RX W HCPCS: Performed by: STUDENT IN AN ORGANIZED HEALTH CARE EDUCATION/TRAINING PROGRAM

## 2021-11-02 RX ORDER — ASPIRIN 81 MG/1
81 TABLET, CHEWABLE ORAL DAILY
Status: DISCONTINUED | OUTPATIENT
Start: 2021-11-03 | End: 2021-12-01 | Stop reason: HOSPADM

## 2021-11-02 RX ORDER — POLYETHYLENE GLYCOL 3350 17 G/17G
17 POWDER, FOR SOLUTION ORAL DAILY PRN
Status: DISCONTINUED | OUTPATIENT
Start: 2021-11-02 | End: 2021-12-01 | Stop reason: HOSPADM

## 2021-11-02 RX ORDER — SENNA PLUS 8.6 MG/1
1 TABLET ORAL 2 TIMES DAILY PRN
Status: DISCONTINUED | OUTPATIENT
Start: 2021-11-02 | End: 2021-12-01 | Stop reason: HOSPADM

## 2021-11-02 RX ADMIN — DEXAMETHASONE SODIUM PHOSPHATE 6 MG: 4 INJECTION, SOLUTION INTRA-ARTICULAR; INTRALESIONAL; INTRAMUSCULAR; INTRAVENOUS; SOFT TISSUE at 08:25

## 2021-11-02 RX ADMIN — ALBUTEROL SULFATE 2.5 MG: 2.5 SOLUTION RESPIRATORY (INHALATION) at 06:56

## 2021-11-02 RX ADMIN — AMIODARONE HYDROCHLORIDE 0.5 MG/MIN: 1.8 INJECTION, SOLUTION INTRAVENOUS at 07:45

## 2021-11-02 RX ADMIN — ASPIRIN 81 MG: 81 TABLET, FILM COATED ORAL at 08:26

## 2021-11-02 RX ADMIN — NAFCILLIN SODIUM 2000 MG: 2 INJECTION, POWDER, LYOPHILIZED, FOR SOLUTION INTRAMUSCULAR; INTRAVENOUS at 06:44

## 2021-11-02 RX ADMIN — AMIODARONE HYDROCHLORIDE 0.5 MG/MIN: 1.8 INJECTION, SOLUTION INTRAVENOUS at 20:04

## 2021-11-02 RX ADMIN — PANTOPRAZOLE SODIUM 40 MG: 40 INJECTION, POWDER, FOR SOLUTION INTRAVENOUS at 08:25

## 2021-11-02 RX ADMIN — HEPARIN SODIUM 1950 UNITS: 1000 INJECTION, SOLUTION INTRAVENOUS; SUBCUTANEOUS at 00:55

## 2021-11-02 RX ADMIN — SODIUM HYPOCHLORITE: 1.25 SOLUTION TOPICAL at 12:31

## 2021-11-02 RX ADMIN — MIDAZOLAM 3 MG/HR: 5 INJECTION, SOLUTION INTRAMUSCULAR; INTRAVENOUS at 20:07

## 2021-11-02 RX ADMIN — POLYETHYLENE GLYCOL 3350 17 G: 17 POWDER, FOR SOLUTION ORAL at 14:13

## 2021-11-02 RX ADMIN — SODIUM CHLORIDE, PRESERVATIVE FREE 10 ML: 5 INJECTION INTRAVENOUS at 20:54

## 2021-11-02 RX ADMIN — SODIUM CHLORIDE, PRESERVATIVE FREE 10 ML: 5 INJECTION INTRAVENOUS at 08:25

## 2021-11-02 RX ADMIN — HEPARIN SODIUM 24 UNITS/KG/HR: 10000 INJECTION, SOLUTION INTRAVENOUS at 10:09

## 2021-11-02 RX ADMIN — ALBUTEROL SULFATE 2.5 MG: 2.5 SOLUTION RESPIRATORY (INHALATION) at 18:36

## 2021-11-02 RX ADMIN — ALLOPURINOL 300 MG: 300 TABLET ORAL at 08:25

## 2021-11-02 RX ADMIN — FENTANYL CITRATE 75 MCG/HR: 50 INJECTION INTRAMUSCULAR; INTRAVENOUS at 06:25

## 2021-11-02 RX ADMIN — ALBUTEROL SULFATE 2.5 MG: 2.5 SOLUTION RESPIRATORY (INHALATION) at 13:36

## 2021-11-02 RX ADMIN — SENNOSIDES 8.6 MG: 8.6 TABLET, COATED ORAL at 16:01

## 2021-11-02 ASSESSMENT — PULMONARY FUNCTION TESTS
PIF_VALUE: 26
PIF_VALUE: 26
PIF_VALUE: 25
PIF_VALUE: 25
PIF_VALUE: 26

## 2021-11-02 NOTE — PROGRESS NOTES
Pharmacy Note  Vancomycin Consult    Alcides Lopez is a 73 y.o. female started on Vancomycin for pneumonia; consult received from Dr. Ese Vitale to manage therapy. Patient Active Problem List   Diagnosis    HTN (hypertension)    Chronic depression    CHF (congestive heart failure) (HCC)    Thrombocytopenia (HCC)    Moderate episode of recurrent major depressive disorder (HCC)    Renal failure syndrome    Hyperkalemia    Isolated non-nephrotic proteinuria    ALVIN (acute kidney injury) ([facility] [address]    Chronic right-sided heart failure (HCC)    Pulmonary HTN (HCC)    Hypotension due to hypovolemia    Acute renal failure superimposed on stage 4 chronic kidney disease (HCC)    Pressure ulcer of right buttock, stage 3 (HCC)    Acute respiratory failure (HCC)    Flash pulmonary edema (HCC)     Allergies:  Patient has no known allergies. Temp max: afebrile    Recent Labs     11/01/21  0545 11/01/21  0545 11/02/21  0420 11/02/21  1300   BUN 44*  --  50*  --    CREATININE 2.1*  --  2.2*  --    WBC 14.1*   < > 12.8* 13.5*    < > = values in this interval not displayed. Intake/Output Summary (Last 24 hours) at 11/2/2021 1519  Last data filed at 11/2/2021 1428  Gross per 24 hour   Intake 2250.84 ml   Output 785 ml   Net 1465.84 ml     Cultures  Date Source Results   10/27/2021 Blood x 2 No growth - prelim   10/27/2021 Pneumonia PCR MRSA & Adenovirus   10/27/2021 Pleural fluid No organisms observed   10/27/2021 Urine No growth   10/28/2021 Respiratory culture MSSA     Ht Readings from Last 1 Encounters:   10/28/21 4' 9\" (1.448 m)        Wt Readings from Last 1 Encounters:   11/02/21 188 lb 4.4 oz (85.4 kg)       Body mass index is 40.74 kg/m². CrCl cannot be calculated (Unknown ideal weight.). Goal Trough Level: ~15 mcg/mL    Assessment/Plan:  Will obtain a random level now then determine dosing based on the level. Thank you for the consult. Will continue to follow.     Hitesh Pepper, PharmD, BCPS,

## 2021-11-02 NOTE — PROGRESS NOTES
Ashely Olmstead, APRN - CNP  Urology Progress Note    Subjective: Clarisse Varner is a 73 y.o. female. His/Her current Diet is: Diet NPO. Since the previous note,  No acute issues overnight. No fevers or chills. No nausea or vomiting.   Now with hematuria  Intubated/sedated  S/P left nephrostomy tube insertion by IR 200UOP  Padilla catheter 430UOP    Vitals and Labs:  Patient Vitals for the past 24 hrs:   BP Temp Temp src Pulse Resp SpO2 Weight   11/02/21 1100 (!) 119/57 -- -- 124 16 95 % --   11/02/21 1000 121/67 -- -- 121 16 95 % --   11/02/21 0925 -- -- -- 107 16 96 % --   11/02/21 0900 (!) 106/58 -- -- 112 9 95 % --   11/02/21 0800 (!) 114/58 97.7 °F (36.5 °C) CORE 110 16 96 % --   11/02/21 0700 (!) 113/59 -- -- 114 16 96 % --   11/02/21 0645 -- -- -- 113 16 96 % --   11/02/21 0630 -- -- -- 125 16 95 % --   11/02/21 0615 -- -- -- 109 14 96 % --   11/02/21 0600 105/64 -- -- 109 16 96 % --   11/02/21 0545 -- -- -- 106 16 96 % --   11/02/21 0530 -- -- -- 116 13 96 % --   11/02/21 0515 -- -- -- 114 13 95 % --   11/02/21 0500 (!) 109/59 -- -- 114 16 96 % --   11/02/21 0445 -- -- -- 111 16 96 % --   11/02/21 0430 -- -- -- 117 16 95 % --   11/02/21 0415 -- -- -- 119 16 93 % --   11/02/21 0400 100/62 97.5 °F (36.4 °C) CORE 106 16 96 % 188 lb 4.4 oz (85.4 kg)   11/02/21 0345 -- -- -- 111 14 96 % --   11/02/21 0330 -- -- -- 108 14 97 % --   11/02/21 0315 -- -- -- 112 14 98 % --   11/02/21 0300 (!) 114/54 -- -- 119 14 97 % --   11/02/21 0245 -- -- -- 109 14 98 % --   11/02/21 0230 -- -- -- 114 16 98 % --   11/02/21 0218 -- -- -- 116 16 98 % --   11/02/21 0215 -- -- -- 120 18 98 % --   11/02/21 0200 (!) 114/55 -- -- 123 16 99 % --   11/02/21 0145 -- -- -- 120 -- 90 % --   11/02/21 0130 -- -- -- 109 16 96 % --   11/02/21 0115 -- -- -- 114 16 96 % --   11/02/21 0100 (!) 106/59 -- -- 118 16 95 % --   11/02/21 0045 -- -- -- 114 16 96 % --   11/02/21 0030 -- -- -- 110 16 96 % --   11/02/21 0015 -- -- -- 116 16 95 % -- 11/02/21 0000 (!) 115/57 97.3 °F (36.3 °C) CORE 122 16 95 % --   11/01/21 2350 -- -- -- -- 16 97 % --   11/01/21 2345 -- -- -- 111 16 97 % --   11/01/21 2330 -- -- -- 99 16 97 % --   11/01/21 2315 -- -- -- 118 16 97 % --   11/01/21 2300 (!) 104/58 -- -- 119 16 97 % --   11/01/21 2245 -- -- -- 104 16 97 % --   11/01/21 2230 -- -- -- 115 16 97 % --   11/01/21 2215 -- -- -- 116 16 97 % --   11/01/21 2200 (!) 104/59 -- -- 115 16 96 % --   11/01/21 2154 -- -- -- 128 16 100 % --   11/01/21 2145 -- -- -- 111 16 99 % --   11/01/21 2130 -- -- -- 108 16 99 % --   11/01/21 2115 -- -- -- 112 16 98 % --   11/01/21 2100 112/60 -- -- 122 16 98 % --   11/01/21 2045 -- -- -- 117 20 98 % --   11/01/21 2030 -- -- -- 125 20 97 % --   11/01/21 2015 -- -- -- 114 16 98 % --   11/01/21 2000 126/61 96.8 °F (36 °C) CORE 108 16 97 % --   11/01/21 1900 (!) 97/57 96.4 °F (35.8 °C) CORE 109 16 97 % --   11/01/21 1800 (!) 108/53 96.1 °F (35.6 °C) CORE 108 16 98 % --   11/01/21 1700 112/62 96.1 °F (35.6 °C) CORE 109 16 98 % --   11/01/21 1600 88/76 95.9 °F (35.5 °C) CORE 111 16 95 % --   11/01/21 1556 -- -- -- -- -- (!) 89 % --   11/01/21 1545 -- 96.3 °F (35.7 °C) CORE 111 16 95 % --   11/01/21 1520 -- -- -- 105 16 97 % --   11/01/21 1443 (!) 121/57 -- -- 119 22 100 % --   11/01/21 1438 (!) 124/55 -- -- 116 20 100 % --   11/01/21 1428 119/61 -- -- 116 20 100 % --   11/01/21 1423 (!) 120/56 -- -- 114 20 100 % --   11/01/21 1418 (!) 107/59 -- -- 123 18 100 % --   11/01/21 1413 (!) 118/57 -- -- 112 18 100 % --   11/01/21 1403 113/61 -- -- 116 18 100 % --   11/01/21 1353 (!) 102/59 -- -- 129 20 100 % --   11/01/21 1344 (!) 100/37 -- -- 124 20 90 % --   11/01/21 1200 (!) 102/59 97.7 °F (36.5 °C) CORE 133 15 95 % --     I/O last 3 completed shifts:   In: 8700 [I.V.:1500; NG/GT:317]  Out: [address]     10/31/21  1740 10/31/21  1740 11/01/21  0545 11/01/21  1845 11/02/21  0420   WBC 11.9*  --  14.1*  --  12.8*   HGB 7.8*   < > 7.7* 8. 1* 8.3*   HCT 26.1*   < > 25.5* 26.8* 27.3*   MCV 85.9  --  83.9  --  83.7     --  189  --  182    < > = values in this interval not displayed. Recent Labs     10/31/21  0440 10/31/21  0440 10/31/21  1740 11/01/21  0545 11/02/21  0420     --   --  134* 134*   K 4.6   < > 4.3 4.7 4.8     --   --  100 99   CO2 20*  --   --  19* 20*   PHOS 4.3  --   --  3.8 3.6   BUN 36*  --   --  44* 50*   CREATININE 1.9*  --   --  2.1* 2.2*    < > = values in this interval not displayed. No results for input(s): COLORU, PHUR, LABCAST, WBCUA, RBCUA, MUCUS, TRICHOMONAS, YEAST, BACTERIA, CLARITYU, SPECGRAV, LEUKOCYTESUR, UROBILINOGEN, Judithann Aus in the last 72 hours. Invalid input(s): NITRATE, GLUCOSEUKETONESUAMORPHOUS    Physical Exam:  Intubated/sedated  Neck is supple  Regular rate and rhythm. Normal peripheral pulses  No accessory muscles of inspiration. Symmetric chest rise  Abdomen soft, non-tender, non-distended. No CVA tenderness. Hematuria  No calf pain. Minimal/no edema in bilateral lower extremities. Skin is warm, dry  Psych:  Intubated/sedated    Additional Lab/Culture results:     Imaging Reviewed:     LANE Sanderson CNP independently reviewed the images and verified the radiology reports from:    ECHO Complete 2D W Doppler W Color    Result Date: 10/28/2021  Transthoracic Echocardiography Report (TTE)  Demographics   Patient Name    Nedra Moore Gender               Female   MR #            [id number]     Race                                                  Ethnicity   Account #       [de-identified]     Room Number          0009   Accession       [id number]    Date of Study        10/28/2021  Number   Date of Birth   1950    Referring Physician  Eduardo Jameson MD   Age             73 year(s)    [address] [facility], Liv Hilliard MD                                Physician  Procedure Type of Study   TTE procedure:ECHOCARDIOGRAM COMPLETE 2D W DOPPLER W COLOR. Procedure Date Date: 10/28/2021 Start: 01:02 PM Study Location: Bedside Technical Quality: Limited visualization due to patient on ventilator. Indications:Evaluate pulmonary artery presures and Congestive heart failure. Additional Medical History:Hypertension, pulmonary hypertension, chronic kidney disease Patient Status: Routine Height: 57 inches Weight: 165 pounds BSA: 1.66 m^2 BMI: 35.71 kg/m^2 BP: 86/55 mmHg Allergies   - No Known Allergies. Conclusions   Summary  Ejection fraction was estimated at 60-65%. The right ventricular size was normal with normal systolic function and  wall thickness. There was trace tricuspid regurgitation. IVC size is dilated with reduced respiratory phasic changes (CVP~10-15  mmHg). Signature   ----------------------------------------------------------------  Electronically signed by Jerad Enrique MD (Interpreting  physician) on 10/28/2021 at 04:38 PM  ----------------------------------------------------------------   Findings   Mitral Valve  The mitral valve structure was normal with normal leaflet separation. DOPPLER: The transmitral velocity was within the normal range with no  evidence for mitral stenosis. There was no evidence of mitral  regurgitation. Aortic Valve  The aortic valve was trileaflet with normal thickness and cuspal  separation. DOPPLER: Transaortic velocity was within the normal range with  no evidence of aortic stenosis. There was no evidence of aortic  regurgitation. Tricuspid Valve  The tricuspid valve structure was normal with normal leaflet separation. DOPPLER: There was no evidence of tricuspid stenosis. There was trace  tricuspid regurgitation.    Pulmonic Valve  The pulmonic valve leaflets exhibited normal thickness, no calcification,  and normal cuspal separation. DOPPLER: The transpulmonic velocity was  within the normal range with no evidence for regurgitation. Left Atrium  Left atrial size was normal.   Left Ventricle  Normal left ventricular size and systolic function. There were no regional wall motion abnormalities. Wall thickness was within normal limits. Ejection fraction was estimated at 60-65%. Right Atrium  Right atrial size was normal.   Right Ventricle  The right ventricular size was normal with normal systolic function and  wall thickness. Pericardial Effusion  The pericardium was normal in appearance with a small, non-hemodynamically  significant pericardial effusion. Prominent pericardial fat pad. Pleural Effusion  No evidence of pleural effusion. Aorta / Great Vessels  IVC size is dilated with reduced respiratory phasic changes (CVP~10-15  mmHg).   M-Mode/2D Measurements & Calculations   LV Diastolic    LV Systolic Dimension: 3  AV Cusp Separation: 1.9 cmLA  Dimension: 4.5  cm                        Dimension: 3.1 cmAO Root  cm              LV Volume Diastolic: 05.2 Dimension: 2.9 cmLA Area: 14  LV FS:33.3 %    ml                        cm^2  LV PW           LV Volume Systolic: 27 ml  Diastolic: 1 cm LV EDV/LV EDV Index: 91.1  Septum          ml/55 m^2LV ESV/LV ESV  Diastolic: 1 cm Index: 27 OS/79 m^2       RV Diastolic Dimension: 3.2 cm                  EF Calculated: 70.4 %                                            LA/Aorta: 1.07                                            Ascending Aorta: 2.7 cm                                            LA volume/Index: 32.1 ml /19m^2  Doppler Measurements & Calculations   MV Peak E-Wave:     AV Peak Velocity: 153 LVOT Peak Velocity: 110 cm/s  80.4 cm/s           cm/s                  LVOT Mean Velocity: 66.7 cm/s  MV Peak A-Wave:     AV Peak Gradient:     LVOT Peak Gradient: 5 mmHgLVOT  65.1 cm/s           9.36 mmHg             Mean Gradient: 2 mmHg  MV E/A Ratio: 1.24  AV Mean Velocity: 116  MV Peak Gradient:   cm/s                  TV Peak E-Wave: 50.9 cm/s  2.59 mmHg           AV Mean Gradient: 6   TV Peak A-Wave: 56.9 cm/s                      mmHg  MV Deceleration     AV VTI: 30.5 cm       TV Peak Gradient: 1.04 mmHg  Time: 243 msec                            TR Velocity:276 cm/s  MV P1/2t: 71 msec                         TR Gradient:30.47 mmHg  MVA by PHT:3.1 cm^2 LVOT VTI: 19.7 cm     PV Peak Velocity: 90.8 cm/s                      IVRT: 70 msec         PV Peak Gradient: 3.3 mmHg                       AV DVI (VTI): 0.65AV  ND ED Velocity: 159 cm/s                      DVI (Vmax):0.72  [email or web address] [id number]%3d    XR CHEST PORTABLE    Result Date: 10/28/2021  1 view chest x-ray. Comparison: CR,SR - XR CHEST PORTABLE - 10/27/2021 03:55 PM EDT Findings: Improvement in bilateral airspace disease. No pneumothorax or pleural effusion. No mediastinal shift or tracheal deviation. Stable cardiomegaly. Passive venous congestion. Osseous structures intact. Tubes and catheters: ET tube 0.8 cm above avelina. NG tube is well within the body of the stomach. Right IJ catheter/sheath tip right atrium. Impression: 1. Persistent left lower lobe atelectasis or consolidation. 2. Cardiomegaly with passive venous congestion. 3. Supportive devices in situ. This document has been electronically signed by: Vonnie Ferrer MD on 10/28/2021 04:30 AM    XR CHEST PORTABLE    Result Date: 10/27/2021  PROCEDURE: XR CHEST PORTABLE CLINICAL INFORMATION: s/p Central Line, ett adjustment COMPARISON: No prior study. TECHNIQUE: AP portable chest radiograph performed. FINDINGS: The tip of the endotracheal tube terminates 9 mm above the avelina and should be adjusted. The tip of the right internal jugular central catheter terminates within the right atrium.  The nasogastric tube terminates below the diaphragms. Increased interstitial pulmonary markings are seen bilaterally. Patchy opacities are seen in the right lung. The main pulmonary artery is dilated. Cardiac silhouette is enlarged. Small left pleural effusion. No pneumothorax. No acute bony abnormality. Degenerative changes of the thoracic spine. 1. The endotracheal tube terminates 9 mm above the avelina and should be withdrawn about 1-2 cm. Reimaging should be obtained. 2. Increased interstitial pulmonary markings are seen bilaterally. Patchy opacities seen in the right lung. 3. Mild cardiomegaly **This report has been created using voice recognition software. It may contain minor errors which are inherent in voice recognition technology. ** Final report electronically signed by Dr Nelly Pinon on 10/27/2021 5:32 PM    XR CHEST PORTABLE    Result Date: 10/27/2021  PROCEDURE: XR CHEST PORTABLE CLINICAL INFORMATION: Postthoracentesis. COMPARISON: Chest x-ray 10/27/2021. TECHNIQUE: AP portable chest radiograph performed. FINDINGS: Lines/tubes: The endotracheal tube terminates approximately 18 mm above the level of the avelina. The nasogastric catheter extends below the hemidiaphragms is distal tip in the projection of the left upper quadrant. Heart/mediastinum: Cardiomegaly is stable. The pulmonary vascularity is unremarkable. Lungs: The left pleural effusion has been drained. No pneumothorax is identified. Improved aeration is noted in the left hemithorax. A trace right pleural effusion is suspected. Bones: Diffuse osteopenia is observed. The visualized skeletal structures appear intact. Status post drainage of the left pleural effusion with improved aeration noted in the left hemithorax. No pneumothorax observed. Cardiomegaly is stable. Life support and monitoring devices are unchanged. **This report has been created using voice recognition software. It may contain minor errors which are inherent in voice recognition technology. ** Final report electronically signed by Dr Telma Lubin on 10/27/2021 4:18 PM    XR CHEST PORTABLE    Result Date: 10/27/2021  PROCEDURE: XR CHEST PORTABLE CLINICAL INFORMATION: Endotracheal tube adjustment. COMPARISON: Chest x-ray 10/27/2021. TECHNIQUE: AP portable chest radiograph performed. FINDINGS: Lines/tubes: The endotracheal tube tip now terminates approximately 18 mm above the level of the avelina. The nasogastric catheter extends below the hemidiaphragms with tip in the projection of the left upper quadrant, incompletely visualized. Heart/mediastinum: Cardiomegaly is unchanged. Lungs: Improved aeration is noted in the left upper lobe. There continues to be left perihilar and left lower lobe consolidation. Improved aeration is noted in the right lower lobe. No pneumothorax is observed. Bones: The visualized skeletal structures appear intact. The endotracheal tube has been adjusted with tip now terminating approximately 18 mm above the level of the avelina. Improved aeration is noted in the left upper lobe and right lower lobe. Persistent dense consolidation in the left mid and lower lobe are observed. **This report has been created using voice recognition software. It may contain minor errors which are inherent in voice recognition technology. ** Final report electronically signed by Dr Telma Lubin on 10/27/2021 3:41 PM    XR CHEST PORTABLE    Result Date: 10/27/2021  PROCEDURE: XR CHEST PORTABLE CLINICAL INFORMATION: Intubation. COMPARISON: Chest x-ray 10/27/2021. TECHNIQUE: AP portable chest radiograph performed. FINDINGS: Lines/tubes: The endotracheal tube tip terminates within the proximal right mainstem bronchus. A nasogastric catheter extends below the hemidiaphragm in the projection of the left upper quadrant. Heart/mediastinum: Cardiomegaly is stable. Lungs: Improved aeration is noted in the left hemithorax. Dense consolidation is seen in the left upper lobe and left lower lobe.  Improved aeration is noted in the right lung base. No pneumothorax is observed. Bones: Diffuse osteopenia is present. The visualized skeletal structures appear intact. The endotracheal tube tip terminates in the right mainstem bronchus. Improved aeration is noted in the right lower lobe and left hemithorax. There continues to be dense consolidation in the left upper lobe and left lower lobe. Cardiomegaly is stable. COMMUNICATION: The results of this examination were discussed with Dr. Zuly Lea at 3:15 PM on 10/27/2021. **This report has been created using voice recognition software. It may contain minor errors which are inherent in voice recognition technology. ** Final report electronically signed by Dr Telma Lubin on 10/27/2021 3:16 PM    XR CHEST PORTABLE    Result Date: 10/27/2021  PROCEDURE: XR CHEST PORTABLE CLINICAL INFORMATION: Shortness of breath. COMPARISON: Chest x-ray 7/15/2020. TECHNIQUE: AP portable chest radiograph performed. FINDINGS: Lines/tubes: Monitoring devices overlie the chest. Heart/mediastinum: The heart size and mediastinal contours are obscured. Pulmonary vascular congestion is observed. Lungs: Near complete opacification of the left hemithorax with very little aerated lung in the left upper lobe is observed. A small right pleural effusion with right lower lobe consolidation obscures visualization of the right hemidiaphragm. No pneumothorax is identified. Bones: Diffuse osteopenia is present. The visualized skeletal structures appear intact. Near complete opacification of the left hemithorax with very little aerated lung visualized in the left upper lobe. Small right pleural effusion and right lower lobe consolidation obscures visualization of the right hemidiaphragm pulmonary vascular congestion is observed. **This report has been created using voice recognition software. It may contain minor errors which are inherent in voice recognition technology. ** Final report electronically signed by  Telmaestrada Lubin on 10/27/2021 2:20 PM    US THORACENTESIS Which side should the procedure be performed? Left    Result Date: 10/27/2021  THORACENTESIS WITH ULTRASOUND GUIDANCE: PERFORMED BY: Enma Stanton M.D. CLINICAL INFORMATION: Pleural effusion left side. APPROACH: Left hemithorax, inferolateral, patient in right side down decubitus position. . Multiple permanent sonographic images were obtained during procedure for documentation. FLUID WITHDRAWN: 1100 mL hickman serous fluid ESTIMATED BLOOD LOSS: Minimal PROCEDURE: Signed informed consent was obtained prior to performing this procedure. The thorax was initially evaluated sonographically to determine appropriate puncture site. The skin was marked, prepped, and draped in a sterile fashion. Following local anesthesia and utilizing aseptic technique, a 5 Bulgarian one-step catheter was successfully inserted into the pleural effusion at the position indicated above. Pleural fluid in the amount was above was then aspirated and the needle was removed. The patient tolerated the procedure well. A post procedure chest radiograph will be obtained. Status post thoracentesis **This report has been created using voice recognition software. It may contain minor errors which are inherent in voice recognition technology. ** Final report electronically signed by Dr. Gloria Nassar on 10/27/2021 4:33 PM      Impression:    Patient Active Problem List   Diagnosis    HTN (hypertension)    Chronic depression    CHF (congestive heart failure) (HCC)    Thrombocytopenia (HCC)    Moderate episode of recurrent major depressive disorder (HCC)    Renal failure syndrome    Hyperkalemia    Isolated non-nephrotic proteinuria    ALVIN (acute kidney injury) ([facility] [address]    Chronic right-sided heart failure (HCC)    Pulmonary HTN (HCC)    Hypotension due to hypovolemia    Acute renal failure superimposed on stage 4 chronic kidney disease (HCC)    Pressure ulcer of right buttock, stage 3

## 2021-11-02 NOTE — CARE COORDINATION
11/2/21, 4:12 PM EDT    DISCHARGE ON GOING EVALUATION    [facility] day: 6  Location: -09/009-A Reason for admit: Acute respiratory failure ([address] [J96.00]  Flash pulmonary edema ([address] [J81.0]  Acute respiratory failure with hypercapnia ([address] [J96.02]   Procedure:   10/27 CXR: Near complete opacification of the left hemithorax with very little aerated lung visualized in the left upper lobe. Small right pleural effusion and right lower lobe consolidation obscures visualization of the right hemidiaphragm pulmonary vascular congestion is observed  10/27 Intubated  10/27 CVC right subclavian  10/27 IR: Left thoracentesis with 1.1L removed  10/28 EEG: no definitive evidence of epileptiform activity appreciated. 10/29 Cardiac Cath with SGC and michelle placed  10/31 CT Abd/pelvis:   1. Bilateral pleural effusions and abnormal densities in the right and left lower lobes consistent with inflammatory process. 2. Cardiomegaly. Small pericardial effusion. 3. Small amount of fluid surrounding the liver. 4. Gallstone. Increased attenuation in the gallbladder. No pericholecystic fluid or biliary dilatation. 5. Large staghorn calculus in the left kidney. Severe left-sided hydronephrosis and hydroureter. Increased density in the left perinephric fat consistent with inflammatory process. 6. Atherosclerotic calcification in the abdominal aorta, iliac and femoral arteries. 7. Padilla catheter within the bladder. 8. Lumbar spondylosis. 9. Increased density in the skin and subcutaneous soft tissues suggestive of edema or anasarca. 10. Enteric tube. Leland-Kasia catheter in place, seen better on plain radiographs     11/1 CT Chest: Moderate bilateral pleural effusions have increased in size in the interval with associated adjacent atelectasis; Persistent pulmonary vascular congestion/edema with cardiomegaly;  Decreased pericardial effusion  11/1 Left Nephrostomy tube placed in IR    Barriers to Discharge: GI consulted for gallstone. Urology consulted for kidney stone. Received 1 PRBC on 10/31. Nephrology consulted yesterday for ALVIN. Left Nephrostomy tube placed yesterday and flushed today by Urology. High TF residuals this am. Still low UO and creat up. No BM - senna and miralax added. Remains on vent w/ETT on PCV, peep 6, FIO2 40%, sats 96%. Afebrile. Afib 110's. Unable to follow commands; PAGAN x4 to painful stim. Hand irrigate nephrostomy tube Q4H PRN for hematuria, clots, retention; flush with 10 ml sterile solution Q2H until clear. Intensivist, Cardiology, GI, and Urology following. Respiratory culture +MRSA and adenovirus by PCR. Telemetry, CVC, OG w/TF, left nephrostomy tube, seaman, SCDs. Amio @ 0.5 mg/min, fentanyl @ 75 mcg/hr, heparin drip, versed @ 3 mg/hr, levo @ 7 mcg/min, asa, nebs, allopurinol, IV protonix, riociguat, dakins daily, IV vancomycin. Na+ 134, CO2 20, BUN 50, Creat 2.2, alb 2.9, wbc 13.5, hgb 8.1. PCP: Henrey Goldberg, MD  Readmission Risk Score: 19.3%  Patient Goals/Plan/Treatment Preferences: From home alone and current with [facility]. DOT on case. Monitor for possible needs. Will need PT/OT when appropriate.

## 2021-11-02 NOTE — PROGRESS NOTES
[facility]         Renal Inpatient Follow-Up note         11/2/2021 11:19 AM    Pt Name:   Lorelei Corado  YOB: 1950  Attending:   Raysa Hardwick MD    Chief Complaint : Lorelei Corado is a 73 y.o. female being followed by nephrology for ALVIN/CKD    Interval History :   Patient seen and examined by me. No distress  Intubated cannot assess history or review of systems  She is however slightly more awake and alert.   Had a left-sided nephrostomy tube placed 11/1/2021     Scheduled Medications :    nafcillin  2,000 mg IntraVENous Q4H    pantoprazole  40 mg IntraVENous QAM    potassium chloride  10 mEq Oral BID WC    sodium hypochlorite   Irrigation Daily    Riociguat  2.5 mg Oral Q8H    sodium chloride flush  5-40 mL IntraVENous 2 times per day    allopurinol  300 mg Oral Daily    aspirin  81 mg Oral Daily    [Held by provider] FLUoxetine  40 mg Oral Daily    [Held by provider] metoprolol tartrate  12.5 mg Oral BID    albuterol  2.5 mg Nebulization Q6H      sodium chloride      fentaNYL (SUBLIMAZE) 1250 mcg in sodium chloride 0.9 % 250 mL 75 mcg/hr (11/02/21 0625)    sodium chloride 25 mL (10/30/21 0129)    amiodarone 0.5 mg/min (11/02/21 0745)    heparin (PORCINE) Infusion 24 Units/kg/hr (11/02/21 1009)    norepinephrine 8 mcg/min (11/02/21 0731)    [Held by provider] sodium chloride 50 mL/hr at 10/28/21 2314    midazolam 3 mg/hr (11/01/21 0746)       Vitals :  BP (!) 113/59   Pulse 107   Temp 97.5 °F (36.4 °C) (Core)   Resp 16   Ht 4' 9\" (1.448 m)   Wt 188 lb 4.4 oz (85.4 kg)   SpO2 96%   BMI 40.74 kg/m²     24HR INTAKE/OUTPUT:      Intake/Output Summary (Last 24 hours) at 11/2/2021 1119  Last data filed at 11/2/2021 0418  Gross per 24 hour   Intake 1674 ml   Output 610 ml   Net 1064 ml     Last 3 weights  Wt Readings from Last 3 Encounters:   11/02/21 188 lb 4.4 oz (85.4 kg)   10/25/21 164 lb (74.4 kg)   09/29/21 160 lb (72.6 kg)           Physical Exam :  General Appearance:  Well developed. No distress  Mouth/Throat:  Oral mucosa moist  Neck:  Supple, no JVD  Lungs:  Breath sounds: clear  Heart[de-identified]  S1,S2 heard  Abdomen:  Soft, non - tender  Musculoskeletal:  Edema -no significant edema noted         Last 3 CBC   Recent Labs     10/31/21  1740 10/31/21  1740 11/01/21  0545 11/01/21  1845 11/02/21  0420   WBC 11.9*  --  14.1*  --  12.8*   RBC 3.04*  --  3.04*  --  3.26*   HGB 7.8*   < > 7.7* 8.1* 8.3*   HCT 26.1*   < > 25.5* 26.8* 27.3*     --  189  --  182    < > = values in this interval not displayed. Last 3 CMP  Recent Labs     10/31/21  0440 10/31/21  0440 10/31/21  1740 11/01/21  0545 11/02/21  0420     --   --  134* 134*   K 4.6   < > 4.3 4.7 4.8     --   --  100 99   CO2 20*  --   --  19* 20*   BUN 36*  --   --  44* 50*   CREATININE 1.9*  --   --  2.1* 2.2*   CALCIUM 8.8  --   --  8.6 9.1   LABALBU 2.6*  --   --  2.7* 2.9*   BILITOT <0.2*  --   --  <0.2* <0.2*    < > = values in this interval not displayed. ASSESSMENT / Plan   1. Renal -acute kidney injury, multifactorial etiology which includes hypotension from sepsis, IV contrast exposure on 10/29 and now has some hydronephrosis as well which may or may not be contributing at this time. ? At this time all the nephrotoxic agents are on hold on Levophed and blood pressure is well maintained. ? Making some urine output. Status post nephrostomy tube placement 11/1/2021  ? Bloodstained urine. ? We will follow renal function closely overall appears to be stable     2. Electrolytes -mild hyponatremia  3. Mild acidosis follow for now  4. Acute hypercapnic respiratory failure  5. Pneumonia with pleural effusion  6. Pulmonary hypertension  7. Obstructive sleep apnea  8. Sepsis  9. Meds reviewed and discussed with  family    Dr. Eddie Monte MD, M,D.  [facility].

## 2021-11-02 NOTE — PROGRESS NOTES
CRITICAL CARE PROGRESS NOTE      Patient:  [facility]    Unit/Bed:[id number]-09/[id number]  YOB: 1950  MRN: [id number]   PCP: Renee Berger MD  Date of Admission: 10/27/2021  Chief Complaint:- Shortness of breath    Assessment and Plan:    Acute combined hypercapnic and hypoxic respiratory failure: Secondary to severe pneumonia with left-sided pleural effusion. Currently on pressure control ventilation with Pressure control 26 PEEP FiO2 60%  Severe bilateral multiorganism pneumonia: PCR positive Staph w/ MECA gene and adenovirus. Culture growing MSSA. Day 6/7 Vancomycin and completed 5 days Rocephin. Septic Shock: 2/2 severe PNA. CI 6.3 Currently on pressor support w/ levophed  Bilateral Pleural Effusion:  2/2 PNA per above. Interval enlargement noted on CT 11/1/21. Improvement in concomitant pericardial effusion  New onset atrial fibrillation with rapid ventricular response: Currently on heparin and amio drip will titrate for HR less than 120. Bridge to Eliquis once stable. Holding metoprolol 2/2 hypotension  PAH/chronic RV failure NYHA II: EF 55 to 60% G2 DD TTE 5/4/2021. RHC present. Juliette Burly catheter shows improvement in PA pressures MPAP <32. Treated w/ Riociguat. Chronic tobacco use:   cessation  Hydronephrosis 2/2 Left-sided staghorn calculi: Urology following. Percutaneous drainage tube 11/2/21 IR. Low output following tube placement. Awaiting recs from urology and nephrology   MERCEDES: s/p 1 unit PRBC  Stage II ALVIN on CKD 3: Suspect post renal etiology 2/2 staghorn calculi. Nephrology following no plans for HD at this time. Of note patient does have baseline CKD positive RYLAN screen suggests underlying autoimmune etiology. GBM antibodies negative, C3/C4 levels normal, elevated kappa/lambda free light chains with normal ratio  Cholelithiasis: w/ dilated CBD. Noted on 10/30/2021 US liver/GB GI following  Stage III decubitus ulcer with inferior tunneling: S/p excision VAC placement 8/21. Infusions:   sodium chloride      fentaNYL (SUBLIMAZE) 1250 mcg in sodium chloride 0.9 % 250 mL 75 mcg/hr (11/01/21 1317)    sodium chloride 25 mL (10/30/21 0129)    amiodarone 0.5 mg/min (11/01/21 1842)    heparin (PORCINE) Infusion 24 Units/kg/hr (11/02/21 0054)    norepinephrine 6 mcg/min (11/02/21 0214)    [Held by provider] sodium chloride 50 mL/hr at 10/28/21 2314    midazolam 3 mg/hr (11/01/21 0746)       PHYSICAL EXAMINATION:  T:  97.5.  P:  113. RR:  16. B/P:  112/65. FiO2:  40. O2 Sat:  95%. I/O:  946/290  Body mass index is 40.74 kg/m². GCS:   8  PC: 26/6: TV: 400: RRTotal: 16: Ti:1 sec  General: Acute on chronically ill-appearing elderly white female  HEENT:  normocephalic and atraumatic. No scleral icterus. PERR  Neck: supple. No Thyromegaly. Lungs: clear to auscultation. No retractions  Cardiac: RRR. No JVD. Abdomen: soft. Nontender. Extremities:  No clubbing, cyanosis, or edema x 4. Vasculature: capillary refill < 3 seconds. Palpable dorsalis pedis pulses. Skin:  warm and dry. Psych: Unable to assess as patient is currently sedated and on mechanical ventilation  Lymph:  No supraclavicular adenopathy. Neurologic:  No focal deficit. No seizures. Lines  CVC placed 10/27/2021  [place] catheter placed 10/29/2021  [place] placed 10/27/2021  Intubated 10/27/2021  Arterial line 10/29/2021    Data: (All radiographs, tracings, PFTs, and imaging are personally viewed and interpreted unless otherwise noted).    Sodium 134 potassium 4.8 chloride 99 bicarb 28 BUN/creatinine 50/2.2 anion gap 15 creatinine 2.2 GFR 22 magnesium 1.8 glucose 130 calcium 9.1 phosphorus 3.6 total protein 5.3  WBC 13.5 H&H 8.1/27.4 12.8 H&H 8.3/27.3 platelets 024  Telemetry shows rate controlled atrial fibrillation   Chest x-ray 10/31/2021 demonstrated diffuse bilateral patchy opacities with left-sided pleural effusion and cardiomegaly  Hemodynamics 11/1/2021 CI 6.3, SVRI 770, CVP 21, PAP 53/29  Respiratory culture collected on 10/28/2021-[facility]  Pneumonia panel collected 10/27/2021 demonstrates staph aureus. MEC-A gene and adenovirus  CT chest on 11/1/2021 demonstrates bilateral pleural effusions with interval enlargement        Seen with multidisciplinary ICU team.  Meets Continued ICU Level Care Criteria:    [x] Yes   [] No - Transfer Planned to listed location:  [] HOSPITALIST CONTACTED-      Case and plan discussed with Dr. Dasia James. Electronically signed by Vivian Garcia DO PGY-2  CRITICAL CARE SPECIALIST  Patient seen by me. Case discussed with resident physician. Continue to wean as tolerated. .  Italicized font represents changes to the note made by me. CC time 35 minutes. Time was discontiguous. Time does not include procedures. Time does include my direct assessment of the patient and coordination of care.   Electronically signed by Edyta Saab MD on 11/3/2021 at 8:14 AM

## 2021-11-03 ENCOUNTER — APPOINTMENT (OUTPATIENT)
Dept: GENERAL RADIOLOGY | Age: 71
DRG: 004 | End: 2021-11-03
Payer: MEDICARE

## 2021-11-03 LAB
ALBUMIN SERPL-MCNC: 2.6 G/DL (ref 3.5–5.1)
ALP BLD-CCNC: 89 U/L (ref 38–126)
ALT SERPL-CCNC: 13 U/L (ref 11–66)
ANION GAP SERPL CALCULATED.3IONS-SCNC: 12 MEQ/L (ref 8–16)
AST SERPL-CCNC: 19 U/L (ref 5–40)
BACTERIA: ABNORMAL
BASOPHILS # BLD: 0.2 %
BASOPHILS ABSOLUTE: 0 THOU/MM3 (ref 0–0.1)
BILIRUB SERPL-MCNC: 0.2 MG/DL (ref 0.3–1.2)
BILIRUBIN URINE: NEGATIVE
BLOOD, URINE: ABNORMAL
BUN BLDV-MCNC: 54 MG/DL (ref 7–22)
CALCIUM IONIZED: 1.24 MMOL/L (ref 1.12–1.32)
CALCIUM SERPL-MCNC: 8.9 MG/DL (ref 8.5–10.5)
CASTS: ABNORMAL /LPF
CHARACTER, URINE: ABNORMAL
CHLORIDE BLD-SCNC: 98 MEQ/L (ref 98–111)
CO2: 20 MEQ/L (ref 23–33)
COLOR: ABNORMAL
CREAT SERPL-MCNC: 2.2 MG/DL (ref 0.4–1.2)
CRYSTALS: ABNORMAL
EOSINOPHIL # BLD: 0 %
EOSINOPHILS ABSOLUTE: 0 THOU/MM3 (ref 0–0.4)
EPITHELIAL CELLS, UA: ABNORMAL /HPF
ERYTHROCYTE [DISTWIDTH] IN BLOOD BY AUTOMATED COUNT: 17.3 % (ref 11.5–14.5)
ERYTHROCYTE [DISTWIDTH] IN BLOOD BY AUTOMATED COUNT: 50.6 FL (ref 35–45)
GFR SERPL CREATININE-BSD FRML MDRD: 22 ML/MIN/1.73M2
GLUCOSE BLD-MCNC: 123 MG/DL (ref 70–108)
GLUCOSE, URINE: NEGATIVE MG/DL
HCT VFR BLD CALC: 26.4 % (ref 37–47)
HEMOGLOBIN: 8.2 GM/DL (ref 12–16)
HEPARIN UNFRACTIONATED: 0.69 U/ML (ref 0.3–0.7)
IMMATURE GRANS (ABS): 0.34 THOU/MM3 (ref 0–0.07)
IMMATURE GRANULOCYTES: 1.8 %
KETONES, URINE: NEGATIVE
LEUKOCYTE EST, POC: ABNORMAL
LYMPHOCYTES # BLD: 4.2 %
LYMPHOCYTES ABSOLUTE: 0.8 THOU/MM3 (ref 1–4.8)
MAGNESIUM: 1.9 MG/DL (ref 1.6–2.4)
MCH RBC QN AUTO: 25.5 PG (ref 26–33)
MCHC RBC AUTO-ENTMCNC: 31.1 GM/DL (ref 32.2–35.5)
MCV RBC AUTO: 82.2 FL (ref 81–99)
MONOCYTES # BLD: 7.2 %
MONOCYTES ABSOLUTE: 1.3 THOU/MM3 (ref 0.4–1.3)
NITRITE, URINE: NEGATIVE
NUCLEATED RED BLOOD CELLS: 0 /100 WBC
PH UA: 6.5 (ref 5–9)
PHOSPHORUS: 3.3 MG/DL (ref 2.4–4.7)
PLATELET # BLD: 166 THOU/MM3 (ref 130–400)
PMV BLD AUTO: 8.6 FL (ref 9.4–12.4)
POTASSIUM SERPL-SCNC: 4.3 MEQ/L (ref 3.5–5.2)
PREALBUMIN: 14.4 MG/DL (ref 20–40)
PROTEIN UA: >= 300 MG/DL
RBC # BLD: 3.21 MILL/MM3 (ref 4.2–5.4)
RBC URINE: > 200 /HPF
RENAL EPITHELIAL, UA: ABNORMAL
SEG NEUTROPHILS: 86.6 %
SEGMENTED NEUTROPHILS ABSOLUTE COUNT: 16 THOU/MM3 (ref 1.8–7.7)
SODIUM BLD-SCNC: 130 MEQ/L (ref 135–145)
SPECIFIC GRAVITY UA: 1.02 (ref 1–1.03)
TOTAL PROTEIN: 5.4 G/DL (ref 6.1–8)
UROBILINOGEN, URINE: 0.2 EU/DL (ref 0–1)
VANCOMYCIN RANDOM: 23.3 UG/ML (ref 0.1–39.9)
WBC # BLD: 18.5 THOU/MM3 (ref 4.8–10.8)
WBC UA: ABNORMAL /HPF
YEAST: ABNORMAL

## 2021-11-03 PROCEDURE — 6360000002 HC RX W HCPCS: Performed by: STUDENT IN AN ORGANIZED HEALTH CARE EDUCATION/TRAINING PROGRAM

## 2021-11-03 PROCEDURE — C9113 INJ PANTOPRAZOLE SODIUM, VIA: HCPCS | Performed by: NURSE PRACTITIONER

## 2021-11-03 PROCEDURE — 94003 VENT MGMT INPAT SUBQ DAY: CPT

## 2021-11-03 PROCEDURE — 99233 SBSQ HOSP IP/OBS HIGH 50: CPT | Performed by: INTERNAL MEDICINE

## 2021-11-03 PROCEDURE — 2580000003 HC RX 258: Performed by: EMERGENCY MEDICINE

## 2021-11-03 PROCEDURE — 99291 CRITICAL CARE FIRST HOUR: CPT | Performed by: INTERNAL MEDICINE

## 2021-11-03 PROCEDURE — 80202 ASSAY OF VANCOMYCIN: CPT

## 2021-11-03 PROCEDURE — 2500000003 HC RX 250 WO HCPCS: Performed by: INTERNAL MEDICINE

## 2021-11-03 PROCEDURE — 6360000002 HC RX W HCPCS: Performed by: FAMILY MEDICINE

## 2021-11-03 PROCEDURE — 84134 ASSAY OF PREALBUMIN: CPT

## 2021-11-03 PROCEDURE — 83735 ASSAY OF MAGNESIUM: CPT

## 2021-11-03 PROCEDURE — 71045 X-RAY EXAM CHEST 1 VIEW: CPT

## 2021-11-03 PROCEDURE — 85520 HEPARIN ASSAY: CPT

## 2021-11-03 PROCEDURE — 2500000003 HC RX 250 WO HCPCS: Performed by: STUDENT IN AN ORGANIZED HEALTH CARE EDUCATION/TRAINING PROGRAM

## 2021-11-03 PROCEDURE — 94640 AIRWAY INHALATION TREATMENT: CPT

## 2021-11-03 PROCEDURE — 92960 CARDIOVERSION ELECTRIC EXT: CPT | Performed by: INTERNAL MEDICINE

## 2021-11-03 PROCEDURE — 6370000000 HC RX 637 (ALT 250 FOR IP): Performed by: STUDENT IN AN ORGANIZED HEALTH CARE EDUCATION/TRAINING PROGRAM

## 2021-11-03 PROCEDURE — 6360000002 HC RX W HCPCS: Performed by: NURSE PRACTITIONER

## 2021-11-03 PROCEDURE — B24BZZ4 ULTRASONOGRAPHY OF HEART WITH AORTA, TRANSESOPHAGEAL: ICD-10-PCS | Performed by: INTERNAL MEDICINE

## 2021-11-03 PROCEDURE — 5A2204Z RESTORATION OF CARDIAC RHYTHM, SINGLE: ICD-10-PCS | Performed by: INTERNAL MEDICINE

## 2021-11-03 PROCEDURE — 6370000000 HC RX 637 (ALT 250 FOR IP): Performed by: FAMILY MEDICINE

## 2021-11-03 PROCEDURE — 36415 COLL VENOUS BLD VENIPUNCTURE: CPT

## 2021-11-03 PROCEDURE — 2000000000 HC ICU R&B

## 2021-11-03 PROCEDURE — 81001 URINALYSIS AUTO W/SCOPE: CPT

## 2021-11-03 PROCEDURE — 6360000002 HC RX W HCPCS: Performed by: EMERGENCY MEDICINE

## 2021-11-03 PROCEDURE — 84100 ASSAY OF PHOSPHORUS: CPT

## 2021-11-03 PROCEDURE — 82330 ASSAY OF CALCIUM: CPT

## 2021-11-03 PROCEDURE — 85025 COMPLETE CBC W/AUTO DIFF WBC: CPT

## 2021-11-03 PROCEDURE — 99232 SBSQ HOSP IP/OBS MODERATE 35: CPT | Performed by: UROLOGY

## 2021-11-03 PROCEDURE — 80053 COMPREHEN METABOLIC PANEL: CPT

## 2021-11-03 PROCEDURE — 2580000003 HC RX 258: Performed by: STUDENT IN AN ORGANIZED HEALTH CARE EDUCATION/TRAINING PROGRAM

## 2021-11-03 PROCEDURE — 2580000003 HC RX 258: Performed by: FAMILY MEDICINE

## 2021-11-03 RX ORDER — BUMETANIDE 0.25 MG/ML
1 INJECTION, SOLUTION INTRAMUSCULAR; INTRAVENOUS ONCE
Status: COMPLETED | OUTPATIENT
Start: 2021-11-03 | End: 2021-11-03

## 2021-11-03 RX ADMIN — SODIUM HYPOCHLORITE: 1.25 SOLUTION TOPICAL at 07:47

## 2021-11-03 RX ADMIN — ALBUTEROL SULFATE 2.5 MG: 2.5 SOLUTION RESPIRATORY (INHALATION) at 00:55

## 2021-11-03 RX ADMIN — ALBUTEROL SULFATE 2.5 MG: 2.5 SOLUTION RESPIRATORY (INHALATION) at 05:56

## 2021-11-03 RX ADMIN — AMIODARONE HYDROCHLORIDE 150 MG: 1.5 INJECTION, SOLUTION INTRAVENOUS at 10:32

## 2021-11-03 RX ADMIN — MIDAZOLAM 2 MG/HR: 5 INJECTION, SOLUTION INTRAMUSCULAR; INTRAVENOUS at 23:48

## 2021-11-03 RX ADMIN — PHENYLEPHRINE HYDROCHLORIDE 155 MCG/MIN: 10 INJECTION INTRAVENOUS at 19:40

## 2021-11-03 RX ADMIN — PANTOPRAZOLE SODIUM 40 MG: 40 INJECTION, POWDER, FOR SOLUTION INTRAVENOUS at 07:47

## 2021-11-03 RX ADMIN — ALBUTEROL SULFATE 2.5 MG: 2.5 SOLUTION RESPIRATORY (INHALATION) at 12:41

## 2021-11-03 RX ADMIN — FENTANYL CITRATE 75 MCG/HR: 50 INJECTION INTRAMUSCULAR; INTRAVENOUS at 13:40

## 2021-11-03 RX ADMIN — HEPARIN SODIUM 24 UNITS/KG/HR: 10000 INJECTION, SOLUTION INTRAVENOUS at 00:03

## 2021-11-03 RX ADMIN — SODIUM CHLORIDE, PRESERVATIVE FREE 10 ML: 5 INJECTION INTRAVENOUS at 09:09

## 2021-11-03 RX ADMIN — BUMETANIDE 1 MG: 0.25 INJECTION, SOLUTION INTRAMUSCULAR; INTRAVENOUS at 13:12

## 2021-11-03 RX ADMIN — SENNOSIDES 8.6 MG: 8.6 TABLET, COATED ORAL at 04:40

## 2021-11-03 RX ADMIN — HEPARIN SODIUM 24 UNITS/KG/HR: 10000 INJECTION, SOLUTION INTRAVENOUS at 13:40

## 2021-11-03 RX ADMIN — AMIODARONE HYDROCHLORIDE 1 MG/MIN: 1.8 INJECTION, SOLUTION INTRAVENOUS at 12:59

## 2021-11-03 RX ADMIN — PHENYLEPHRINE HYDROCHLORIDE 30 MCG/MIN: 10 INJECTION INTRAVENOUS at 12:47

## 2021-11-03 RX ADMIN — FENTANYL CITRATE 75 MCG/HR: 50 INJECTION INTRAMUSCULAR; INTRAVENOUS at 01:48

## 2021-11-03 RX ADMIN — PHENYLEPHRINE HYDROCHLORIDE 300 MCG/MIN: 10 INJECTION INTRAVENOUS at 23:46

## 2021-11-03 RX ADMIN — ALLOPURINOL 300 MG: 300 TABLET ORAL at 07:47

## 2021-11-03 RX ADMIN — ASPIRIN 81 MG CHEWABLE TABLET 81 MG: 81 TABLET CHEWABLE at 07:47

## 2021-11-03 RX ADMIN — AMIODARONE HYDROCHLORIDE 1 MG/MIN: 1.8 INJECTION, SOLUTION INTRAVENOUS at 19:07

## 2021-11-03 RX ADMIN — POLYETHYLENE GLYCOL 3350 17 G: 17 POWDER, FOR SOLUTION ORAL at 07:47

## 2021-11-03 RX ADMIN — ALBUTEROL SULFATE 2.5 MG: 2.5 SOLUTION RESPIRATORY (INHALATION) at 16:07

## 2021-11-03 ASSESSMENT — PULMONARY FUNCTION TESTS
PIF_VALUE: 17
PIF_VALUE: 24
PIF_VALUE: 17
PIF_VALUE: 21
PIF_VALUE: 18
PIF_VALUE: 17

## 2021-11-03 NOTE — PROGRESS NOTES
Comprehensive Nutrition Assessment    Type and Reason for Visit:  Reassess (TF check)    Nutrition Recommendations/Plan:   Nepro TF at 10 ml/hr, d/t intolerance, increased residuals & note no bm so far since admit ( 7 days). Plan Nepro TF goal of 30 ml/hr as tolerated. Per Rona Billy continues to give senokot & glycolax,   Continue weight checks. Nutrition Assessment:    Pt. nutritionally declining  AEB NPO status,TF held 11/2 with residuals over 450 ml, restarted at 10 ml/hr,not at goal, no bm this admit so far ( 7 days).  At risk for further nutrition compromise r/t admitted with acute respiratory failure, intubated 10/27, AFib, ALVIN, stage 3 sacral wound,10/31 for  nephrosotmy tube placement, anemia , concern for cholecystitis. and underlying medical condition (CKD, s/p ID of buttock wound 8/6/21,CHF, gout, pulmonary HTN, obesity ).  Nutrition recommendations/interventions as per above. Malnutrition Assessment:  Malnutrition Status: At risk for malnutrition (Comment)    Context:  Acute Illness     Findings of the 6 clinical characteristics of malnutrition:  Energy Intake:  Less than 75%of projected energy needs for greater than 7 days   Weight Loss:   (5.8% weight loss in 3.5 months)     Body Fat Loss:  No significant body fat loss     Muscle Mass Loss:  Unable to assess    Fluid Accumulation:  1 - Mild     Strength:  Not Performed    Estimated Daily Nutrient Needs:  Energy (kcal):  ~1127 kcals (15); Weight Used for Energy Requirements:  Admission (75.1 kg)     Protein (g):  ~59 grams ( 1.4) monitor reanl status; Weight Used for Protein Requirements:  Ideal (42.3 kg)        Fluid (ml/day):  per Dr;   Nutrition Related Findings:     Pt  intubated 10/27, 10/31 nephrostomy tube placed , TF infusing at 10 ml/hr ( goal 30 ml/hr) .   Spoke with Rona Billy who reports residuals of 240 ml with TF at 10 ml/hr, no bm yet ( 7 days ), plan to increase TF as tolerated, Per Rona Billy continues to give senokot & [phone]

## 2021-11-03 NOTE — PROGRESS NOTES
LANE Sheth - CNP  Urology Progress Note    Subjective: Miguel Ángel Chau is a 73 y.o. female. His/Her current Diet is: Diet NPO  ADULT TUBE FEEDING; Orogastric; Renal Formula; Continuous; 10; Yes; 10; Q 8 hours; 30; 30; Q 4 hours. Since the previous note,  No acute issues overnight. No fevers or chills. No nausea or vomiting.   Now with hematuria  Intubated/sedated  S/P left nephrostomy tube insertion by IR 15UOP  Padilla catheter 815UOP    Managing hematuria with hand irrigation, discussed with nurse  Vitals and Labs:  Patient Vitals for the past 24 hrs:   BP Temp Temp src Pulse Resp SpO2   11/03/21 1300 (!) 114/54 -- -- 94 17 91 %   11/03/21 1238 -- -- -- -- -- 92 %   11/03/21 1200 (!) 99/49 98.4 °F (36.9 °C) CORE 93 (!) 35 92 %   11/03/21 1150 -- -- -- -- -- (!) 89 %   11/03/21 1100 (!) 83/52 -- -- 123 16 91 %   11/03/21 1000 (!) 78/47 -- -- 130 14 91 %   11/03/21 0900 120/79 -- -- 128 16 93 %   11/03/21 0800 113/74 98.1 °F (36.7 °C) CORE 122 16 92 %   11/03/21 0757 -- -- -- 133 16 94 %   11/03/21 0700 113/75 -- -- 136 16 95 %   11/03/21 0645 -- -- -- 134 16 96 %   11/03/21 0630 -- -- -- 130 16 96 %   11/03/21 0615 -- -- -- 124 16 96 %   11/03/21 0600 118/64 -- -- 115 18 97 %   11/03/21 0557 -- -- -- -- 16 97 %   11/03/21 0551 -- -- -- 121 16 97 %   11/03/21 0545 -- -- -- 127 16 97 %   11/03/21 0530 -- -- -- 130 16 97 %   11/03/21 0515 -- -- -- 127 16 97 %   11/03/21 0500 107/62 -- -- 115 16 97 %   11/03/21 0445 -- -- -- 121 16 97 %   11/03/21 0430 -- -- -- 135 16 97 %   11/03/21 0415 -- -- -- 129 15 97 %   11/03/21 0400 119/76 97.7 °F (36.5 °C) CORE 126 16 93 %   11/03/21 0345 -- -- -- 119 16 97 %   11/03/21 0330 -- -- -- 128 16 97 %   11/03/21 0315 -- -- -- 122 17 97 %   11/03/21 0300 114/67 -- -- 121 16 97 %   11/03/21 0245 -- -- -- 122 16 96 %   11/03/21 0230 -- -- -- 136 16 93 %   11/03/21 0215 -- -- -- 128 21 94 %   11/03/21 0200 (!) 107/57 -- -- 129 16 94 %   11/03/21 0145 -- -- -- 138 16 93 %   11/03/21 0140 -- -- -- 125 16 96 %   11/03/21 0130 -- -- -- 123 16 95 %   11/03/21 0115 -- -- -- 128 16 96 %   11/03/21 0100 (!) 114/58 -- -- 117 16 96 %   11/03/21 0056 -- -- -- -- 16 96 %   11/03/21 0045 -- -- -- 113 16 96 %   11/03/21 0030 -- -- -- 123 16 96 %   11/03/21 0015 -- -- -- 133 16 95 %   11/03/21 0000 116/62 97.4 °F (36.3 °C) CORE 115 16 96 %   11/02/21 2345 -- -- -- 117 16 96 %   11/02/21 2330 -- -- -- 118 16 96 %   11/02/21 2315 -- -- -- 122 18 96 %   11/02/21 2300 122/71 -- -- 126 16 96 %   11/02/21 2245 -- -- -- 116 18 96 %   11/02/21 2230 -- -- -- 120 16 97 %   11/02/21 2215 -- -- -- 123 16 97 %   11/02/21 2200 111/68 -- -- 118 16 97 %   11/02/21 2145 -- -- -- 125 16 97 %   11/02/21 2144 -- -- -- 132 16 97 %   11/02/21 2130 -- -- -- 120 16 97 %   11/02/21 2115 -- -- -- 115 16 97 %   11/02/21 2100 117/62 -- -- 123 16 96 %   11/02/21 2045 -- -- -- 130 13 96 %   11/02/21 2030 -- -- -- 122 16 97 %   11/02/21 2015 -- -- -- 125 16 96 %   11/02/21 2000 (!) 122/59 97.3 °F (36.3 °C) CORE 124 16 97 %   11/02/21 1945 -- -- -- 138 16 96 %   11/02/21 1930 -- -- -- 117 16 96 %   11/02/21 1915 -- -- -- 119 16 96 %   11/02/21 1900 (!) 126/58 -- -- 126 16 97 %   11/02/21 1836 -- -- -- -- 16 97 %   11/02/21 1800 123/63 -- -- 106 16 97 %   11/02/21 1741 -- -- -- 113 16 97 %   11/02/21 1700 (!) 122/59 -- -- 117 16 96 %   11/02/21 1600 (!) 123/54 97.5 °F (36.4 °C) CORE 123 16 95 %   11/02/21 1500 119/63 -- -- 118 16 96 %     I/O last 3 completed shifts:   In: 1853.8 [I.V.:1853.8]  Out: 830 [Urine:830]    Recent Labs     11/02/21  0420 11/02/21  1300 11/03/21  0145   WBC 12.8* 13.5* 18.5*   HGB 8.3* 8.1* 8.2*   HCT 27.3* 27.4* 26.4*   MCV 83.7 84.8 82.2    166 166     Recent Labs     11/01/21  0545 11/02/21  0420 11/03/21  0145   * 134* 130*   K 4.7 4.8 4.3    99 98   CO2 19* 20* 20*   PHOS 3.8 3.6 3.3   BUN 44* 50* 54*   CREATININE 2.1* 2.2* 2.2*       Recent Labs     11/03/21  1150   COLORU RED* PHUR 6.5   LABCAST NONE SEEN   WBCUA 10-15W/CLUMPS   RBCUA > 200   YEAST NONE SEEN   BACTERIA FEW   SPECGRAV 1.024   LEUKOCYTESUR SMALL*   UROBILINOGEN 0.2   BILIRUBINUR NEGATIVE   BLOODU LARGE*       Physical Exam:  Intubated/sedated  Neck is supple  Regular rate and rhythm. Normal peripheral pulses  No accessory muscles of inspiration. Symmetric chest rise  Abdomen soft, non-tender, non-distended. No CVA tenderness. Hematuria  No calf pain. Minimal/no edema in bilateral lower extremities. Skin is warm, dry  Psych: Intubated/sedated    Additional Lab/Culture results:     Imaging Reviewed:     LANE Vega CNP independently reviewed the images and verified the radiology reports from:    ECHO Complete 2D W Doppler W Color    Result Date: 10/28/2021  Transthoracic Echocardiography Report (TTE)  Demographics   Patient Name    Fabio Colón Gender               Female   MR #            [id number]     Race                                                  Ethnicity   Account #       [de-identified]     Room Number          0009   Accession       [id number]    Date of Study        10/28/2021  Number   Date of Birth   1950    Referring Physician  Pietro Hagen MD   Age             73 year(s)    Juan F Frankel MD                                Physician  Procedure Type of Study   TTE procedure:ECHOCARDIOGRAM COMPLETE 2D W DOPPLER W COLOR. Procedure Date Date: 10/28/2021 Start: 01:02 PM Study Location: Bedside Technical Quality: Limited visualization due to patient on ventilator. Indications:Evaluate pulmonary artery presures and Congestive heart failure.  Additional Medical History:Hypertension, pulmonary hypertension, chronic kidney disease Patient Status: Routine Height: 57 inches Weight: 165 pounds BSA: 1.66 m^2 BMI: 35.71 kg/m^2 BP: 86/55 mmHg Allergies   - No Known Allergies. Conclusions   Summary  Ejection fraction was estimated at 60-65%. The right ventricular size was normal with normal systolic function and  wall thickness. There was trace tricuspid regurgitation. IVC size is dilated with reduced respiratory phasic changes (CVP~10-15  mmHg). Signature   ----------------------------------------------------------------  Electronically signed by Toma Gu MD (Interpreting  physician) on 10/28/2021 at 04:38 PM  ----------------------------------------------------------------   Findings   Mitral Valve  The mitral valve structure was normal with normal leaflet separation. DOPPLER: The transmitral velocity was within the normal range with no  evidence for mitral stenosis. There was no evidence of mitral  regurgitation. Aortic Valve  The aortic valve was trileaflet with normal thickness and cuspal  separation. DOPPLER: Transaortic velocity was within the normal range with  no evidence of aortic stenosis. There was no evidence of aortic  regurgitation. Tricuspid Valve  The tricuspid valve structure was normal with normal leaflet separation. DOPPLER: There was no evidence of tricuspid stenosis. There was trace  tricuspid regurgitation. Pulmonic Valve  The pulmonic valve leaflets exhibited normal thickness, no calcification,  and normal cuspal separation. DOPPLER: The transpulmonic velocity was  within the normal range with no evidence for regurgitation. Left Atrium  Left atrial size was normal.   Left Ventricle  Normal left ventricular size and systolic function. There were no regional wall motion abnormalities. Wall thickness was within normal limits. Ejection fraction was estimated at 60-65%. Right Atrium  Right atrial size was normal.   Right Ventricle  The right ventricular size was normal with normal systolic function and  wall thickness.    Pericardial Effusion  The pericardium was normal in appearance with a small, non-hemodynamically  significant pericardial effusion. Prominent pericardial fat pad. Pleural Effusion  No evidence of pleural effusion. Aorta / Great Vessels  IVC size is dilated with reduced respiratory phasic changes (CVP~10-15  mmHg).   M-Mode/2D Measurements & Calculations   LV Diastolic    LV Systolic Dimension: 3  AV Cusp Separation: 1.9 cmLA  Dimension: 4.5  cm                        Dimension: 3.1 cmAO Root  cm              LV Volume Diastolic: 05.8 Dimension: 2.9 cmLA Area: 14  LV FS:33.3 %    ml                        cm^2  LV PW           LV Volume Systolic: 27 ml  Diastolic: 1 cm LV EDV/LV EDV Index: 91.1  Septum          ml/55 m^2LV ESV/LV ESV  Diastolic: 1 cm Index: 27 LE/83 m^2       RV Diastolic Dimension: 3.2 cm                  EF Calculated: 70.4 %                                            LA/Aorta: 1.07                                            Ascending Aorta: 2.7 cm                                            LA volume/Index: 32.1 ml /19m^2  Doppler Measurements & Calculations   MV Peak E-Wave:     AV Peak Velocity: 153 LVOT Peak Velocity: 110 cm/s  80.4 cm/s           cm/s                  LVOT Mean Velocity: 66.7 cm/s  MV Peak A-Wave:     AV Peak Gradient:     LVOT Peak Gradient: 5 mmHgLVOT  65.1 cm/s           9.36 mmHg             Mean Gradient: 2 mmHg  MV E/A Ratio: 1.24  AV Mean Velocity: 116  MV Peak Gradient:   cm/s                  TV Peak E-Wave: 50.9 cm/s  2.59 mmHg           AV Mean Gradient: 6   TV Peak A-Wave: 56.9 cm/s                      mmHg  MV Deceleration     AV VTI: 30.5 cm       TV Peak Gradient: 1.04 mmHg  Time: 243 msec                            TR Velocity:276 cm/s  MV P1/2t: 71 msec                         TR Gradient:30.47 mmHg  MVA by PHT:3.1 cm^2 LVOT VTI: 19.7 cm     PV Peak Velocity: 90.8 cm/s                      IVRT: 70 msec         PV Peak Gradient: 3.3 mmHg                       AV DVI (VTI): 0.65AV  MT ED Velocity: 159 cm/s                      DVI (Vmax):0.72  [email or web address] [id number]%3d    XR CHEST PORTABLE    Result Date: 10/28/2021  1 view chest x-ray. Comparison: CR,SR - XR CHEST PORTABLE - 10/27/2021 03:55 PM EDT Findings: Improvement in bilateral airspace disease. No pneumothorax or pleural effusion. No mediastinal shift or tracheal deviation. Stable cardiomegaly. Passive venous congestion. Osseous structures intact. Tubes and catheters: ET tube 0.8 cm above avelina. NG tube is well within the body of the stomach. Right IJ catheter/sheath tip right atrium. Impression: 1. Persistent left lower lobe atelectasis or consolidation. 2. Cardiomegaly with passive venous congestion. 3. Supportive devices in situ. This document has been electronically signed by: Ashley Dinh MD on 10/28/2021 04:30 AM    XR CHEST PORTABLE    Result Date: 10/27/2021  PROCEDURE: XR CHEST PORTABLE CLINICAL INFORMATION: s/p Central Line, ett adjustment COMPARISON: No prior study. TECHNIQUE: AP portable chest radiograph performed. FINDINGS: The tip of the endotracheal tube terminates 9 mm above the avelina and should be adjusted. The tip of the right internal jugular central catheter terminates within the right atrium. The nasogastric tube terminates below the diaphragms. Increased interstitial pulmonary markings are seen bilaterally. Patchy opacities are seen in the right lung. The main pulmonary artery is dilated. Cardiac silhouette is enlarged. Small left pleural effusion. No pneumothorax. No acute bony abnormality. Degenerative changes of the thoracic spine. 1. The endotracheal tube terminates 9 mm above the avelina and should be withdrawn about 1-2 cm. Reimaging should be obtained. 2. Increased interstitial pulmonary markings are seen bilaterally. Patchy opacities seen in the right lung.  3. Mild cardiomegaly **This report has been created using voice recognition software. It may contain minor errors which are inherent in voice recognition technology. ** Final report electronically signed by Dr Denzel Ayala on 10/27/2021 5:32 PM    XR CHEST PORTABLE    Result Date: 10/27/2021  PROCEDURE: XR CHEST PORTABLE CLINICAL INFORMATION: Postthoracentesis. COMPARISON: Chest x-ray 10/27/2021. TECHNIQUE: AP portable chest radiograph performed. FINDINGS: Lines/tubes: The endotracheal tube terminates approximately 18 mm above the level of the avelina. The nasogastric catheter extends below the hemidiaphragms is distal tip in the projection of the left upper quadrant. Heart/mediastinum: Cardiomegaly is stable. The pulmonary vascularity is unremarkable. Lungs: The left pleural effusion has been drained. No pneumothorax is identified. Improved aeration is noted in the left hemithorax. A trace right pleural effusion is suspected. Bones: Diffuse osteopenia is observed. The visualized skeletal structures appear intact. Status post drainage of the left pleural effusion with improved aeration noted in the left hemithorax. No pneumothorax observed. Cardiomegaly is stable. Life support and monitoring devices are unchanged. **This report has been created using voice recognition software. It may contain minor errors which are inherent in voice recognition technology. ** Final report electronically signed by Dr Saud Hooper on 10/27/2021 4:18 PM    XR CHEST PORTABLE    Result Date: 10/27/2021  PROCEDURE: XR CHEST PORTABLE CLINICAL INFORMATION: Endotracheal tube adjustment. COMPARISON: Chest x-ray 10/27/2021. TECHNIQUE: AP portable chest radiograph performed. FINDINGS: Lines/tubes: The endotracheal tube tip now terminates approximately 18 mm above the level of the avelina. The nasogastric catheter extends below the hemidiaphragms with tip in the projection of the left upper quadrant, incompletely visualized.  Heart/mediastinum: Cardiomegaly is unchanged. Lungs: Improved aeration is noted in the left upper lobe. There continues to be left perihilar and left lower lobe consolidation. Improved aeration is noted in the right lower lobe. No pneumothorax is observed. Bones: The visualized skeletal structures appear intact. The endotracheal tube has been adjusted with tip now terminating approximately 18 mm above the level of the avelina. Improved aeration is noted in the left upper lobe and right lower lobe. Persistent dense consolidation in the left mid and lower lobe are observed. **This report has been created using voice recognition software. It may contain minor errors which are inherent in voice recognition technology. ** Final report electronically signed by Dr Silvia Deluca on 10/27/2021 3:41 PM    XR CHEST PORTABLE    Result Date: 10/27/2021  PROCEDURE: XR CHEST PORTABLE CLINICAL INFORMATION: Intubation. COMPARISON: Chest x-ray 10/27/2021. TECHNIQUE: AP portable chest radiograph performed. FINDINGS: Lines/tubes: The endotracheal tube tip terminates within the proximal right mainstem bronchus. A nasogastric catheter extends below the hemidiaphragm in the projection of the left upper quadrant. Heart/mediastinum: Cardiomegaly is stable. Lungs: Improved aeration is noted in the left hemithorax. Dense consolidation is seen in the left upper lobe and left lower lobe. Improved aeration is noted in the right lung base. No pneumothorax is observed. Bones: Diffuse osteopenia is present. The visualized skeletal structures appear intact. The endotracheal tube tip terminates in the right mainstem bronchus. Improved aeration is noted in the right lower lobe and left hemithorax. There continues to be dense consolidation in the left upper lobe and left lower lobe. Cardiomegaly is stable. COMMUNICATION: The results of this examination were discussed with Dr. Ac Zurita at 3:15 PM on 10/27/2021.  **This report has been created using voice recognition software. It may contain minor errors which are inherent in voice recognition technology. ** Final report electronically signed by Dr Kate Argueta on 10/27/2021 3:16 PM    XR CHEST PORTABLE    Result Date: 10/27/2021  PROCEDURE: XR CHEST PORTABLE CLINICAL INFORMATION: Shortness of breath. COMPARISON: Chest x-ray 7/15/2020. TECHNIQUE: AP portable chest radiograph performed. FINDINGS: Lines/tubes: Monitoring devices overlie the chest. Heart/mediastinum: The heart size and mediastinal contours are obscured. Pulmonary vascular congestion is observed. Lungs: Near complete opacification of the left hemithorax with very little aerated lung in the left upper lobe is observed. A small right pleural effusion with right lower lobe consolidation obscures visualization of the right hemidiaphragm. No pneumothorax is identified. Bones: Diffuse osteopenia is present. The visualized skeletal structures appear intact. Near complete opacification of the left hemithorax with very little aerated lung visualized in the left upper lobe. Small right pleural effusion and right lower lobe consolidation obscures visualization of the right hemidiaphragm pulmonary vascular congestion is observed. **This report has been created using voice recognition software. It may contain minor errors which are inherent in voice recognition technology. ** Final report electronically signed by Dr Kate Argueta on 10/27/2021 2:20 PM    US THORACENTESIS Which side should the procedure be performed? Left    Result Date: 10/27/2021  THORACENTESIS WITH ULTRASOUND GUIDANCE: PERFORMED BY: Satya Clifton M.D. CLINICAL INFORMATION: Pleural effusion left side. APPROACH: Left hemithorax, inferolateral, patient in right side down decubitus position. . Multiple permanent sonographic images were obtained during procedure for documentation.  FLUID WITHDRAWN: 1100 mL hickman serous fluid ESTIMATED BLOOD LOSS: Minimal PROCEDURE: Signed informed consent was obtained prior to performing this procedure. The thorax was initially evaluated sonographically to determine appropriate puncture site. The skin was marked, prepped, and draped in a sterile fashion. Following local anesthesia and utilizing aseptic technique, a 5 French one-step catheter was successfully inserted into the pleural effusion at the position indicated above. Pleural fluid in the amount was above was then aspirated and the needle was removed. The patient tolerated the procedure well. A post procedure chest radiograph will be obtained. Status post thoracentesis **This report has been created using voice recognition software. It may contain minor errors which are inherent in voice recognition technology. ** Final report electronically signed by Dr. Cindy Grace on 10/27/2021 4:33 PM      Impression:    Patient Active Problem List   Diagnosis    HTN (hypertension)    Chronic depression    CHF (congestive heart failure) (HCC)    Thrombocytopenia (HCC)    Moderate episode of recurrent major depressive disorder (HCC)    Renal failure syndrome    Hyperkalemia    Isolated non-nephrotic proteinuria    ALVIN [facility]) ([facility] [address]    Chronic right-sided heart failure (HCC)    Pulmonary HTN (HCC)    Hypotension due to hypovolemia    Acute renal failure superimposed on stage 4 chronic kidney disease (HCC)    Pressure ulcer of right buttock, stage 3 (HCC)    Acute respiratory failure (HCC)    Flash pulmonary edema (HCC)         IMPRESSION/Plan:  Left staghorn calculus  Resp failure  PNA vs atelectasis  Left pleural effusion  HFpEF  Afib  BARBER    Hematuria in seaman and neph tube, expected as she is on anticoagulation that cannot be held  Neph flushed easily   HGB stable at 8.2 this AM  Seaman irrigated small clots evacuated  Hand irrigate every 4 hrs and as needed for hematuria, clots, retention  Neph tube needs flushed with 10ml sterile solution every 2 hrs until clear    Ct shows Large staghorn calculus in the left kidney. Severe left-sided hydronephrosis and hydroureter. Increased density in the left perinephric fat consistent with inflammatory process  .   POD #1 left nephrostomy tube insertion    Urology plans to treat stone in future, once recovered from her acute illness    LANE Moy - CNP  2:09 PM 11/3/2021

## 2021-11-03 NOTE — PROGRESS NOTES
[facility]         Renal Inpatient Follow-Up note         11/3/2021 7:50 AM    Pt Name:   Clarisse Varner  YOB: 1950  Attending:   Trude Bence, MD    Chief Complaint : Clarisse Varner is a 73 y.o. female being followed by nephrology for ALVIN/CKD    Interval History :   Patient seen and examined by me. No distress  Intubated cannot assess history or review of systems  Still having almost myesha blood from the Padilla and the nephrostomy tube  She is on a heparin drip as well. She is 12 L positive.   Continues to be on pressors     Scheduled Medications :    Amiodarone HCl in Dextrose        Amiodarone HCl in Dextrose  150 mg IntraVENous Once    aspirin  81 mg Oral Daily    vancomycin (VANCOCIN) intermittent dosing (placeholder)   Other RX Placeholder    pantoprazole  40 mg IntraVENous QAM    sodium hypochlorite   Irrigation Daily    Riociguat  2.5 mg Oral Q8H    sodium chloride flush  5-40 mL IntraVENous 2 times per day    allopurinol  300 mg Oral Daily    [Held by provider] FLUoxetine  40 mg Oral Daily    [Held by provider] metoprolol tartrate  12.5 mg Oral BID    albuterol  2.5 mg Nebulization Q6H      sodium chloride      fentaNYL (SUBLIMAZE) 1250 mcg in sodium chloride 0.9 % 250 mL 100 mcg/hr (11/03/21 0405)    sodium chloride 25 mL (10/30/21 0129)    amiodarone 1 mg/min (11/03/21 0643)    heparin (PORCINE) Infusion 24 Units/kg/hr (11/03/21 0003)    norepinephrine 5 mcg/min (11/03/21 0143)    [Held by provider] sodium chloride 50 mL/hr at 10/28/21 2314    midazolam 3 mg/hr (11/02/21 2007)       Vitals :  /75   Pulse 136   Temp 97.7 °F (36.5 °C) (Core)   Resp 16   Ht 4' 9\" (1.448 m)   Wt 188 lb 4.4 oz (85.4 kg)   SpO2 95%   BMI 40.74 kg/m²     24HR INTAKE/OUTPUT:      Intake/Output Summary (Last 24 hours) at 11/3/2021 0750  Last data filed at 11/3/2021 0409  Gross per 24 hour   Intake 1853.84 ml   Output 830 ml   Net 1023.84 ml     Last 3 weights  Wt Readings from Last 3 Encounters:   11/02/21 188 lb 4.4 oz (85.4 kg)   10/25/21 164 lb (74.4 kg)   09/29/21 160 lb (72.6 kg)           Physical Exam :  General Appearance:  Well developed. No distress  Mouth/Throat:  Oral mucosa moist.  ET tube in place  Neck:  Supple, no JVD  Lungs:  Breath sounds: clear, diminished  Heart[de-identified]  S1,S2 heard  Abdomen:  Soft, non - tender  Musculoskeletal:  Edema -no significant edema noted         Last 3 CBC   Recent Labs     11/02/21  0420 11/02/21  1300 11/03/21  0145   WBC 12.8* 13.5* 18.5*   RBC 3.26* 3.23* 3.21*   HGB 8.3* 8.1* 8.2*   HCT 27.3* 27.4* 26.4*    166 166     Last 3 CMP  Recent Labs     11/01/21  0545 11/02/21  0420 11/03/21  0145   * 134* 130*   K 4.7 4.8 4.3    99 98   CO2 19* 20* 20*   BUN 44* 50* 54*   CREATININE 2.1* 2.2* 2.2*   CALCIUM 8.6 9.1 8.9   LABALBU 2.7* 2.9* 2.6*   BILITOT <0.2* <0.2* 0.2*             ASSESSMENT / Plan   1. Renal -acute kidney injury, multifactorial etiology which includes hypotension from sepsis, IV contrast exposure on 10/29 and now has some hydronephrosis as well which may or may not be contributing at this time. ? At this time all the nephrotoxic agents are on hold on Levophed and blood pressure is well maintained. ? Making some urine output. Status post nephrostomy tube placement 11/1/2021  ? Bloodstained urine. Creatinine maintaining stable she is +12 L  ? We will give 1 mg of Bumex today     2. Electrolytes -mild hyponatremia, due to multiple hypotonic drips  3. Mild acidosis follow for now  4. Acute hypercapnic respiratory failure  5. Pneumonia with pleural effusion  6. Pulmonary hypertension  7. Obstructive sleep apnea  8. Sepsis  9. Meds reviewed and discussed with  nursing staff    Dr. Yaritza Oscar MD, M,D.  [facility].

## 2021-11-03 NOTE — PROGRESS NOTES
Patient Weaning Progress    The patient's vent settings was able to be weaned this shift. Ventilator settings that were weaned              [] Mode   [x] Pressure support weaned   [] Fio2 weaned   [] Peep weaned             Spontaneous weaning trial  was not attempted. Evac tube was  hooked up with continuous low suction(20-30mmHg)      Cuff was  deflated to determine cuff leak. A leak  was detected.    *Specific details of weaning located in Ventilator documentation flowsheets*

## 2021-11-03 NOTE — PROGRESS NOTES
Gastroenterology Progress Note:     Patient Name:  Karen Barbosa   MRN: [id number]  [id number]  YOB: 1950  Admit Date: 10/27/2021  1:36 PM  Primary Care Physician: Butch Vega MD   4D-09/009-A     Patient seen and examined. 24 hours events and chart reviewed. Subjective: Patient intubated & sedated. She went into afib with RVR and underwent cardioversion. She was started on an Amio & Heparin gtt. No GI bleeding noted per RN. Hgb 8.2 She is having moderate hematuria. Objective:  BP (!) 99/49   Pulse 93   Temp 98.4 °F (36.9 °C) (Core)   Resp (!) 35   Ht 4' 9\" (1.448 m)   Wt 188 lb 4.4 oz (85.4 kg)   SpO2 92%   BMI 40.74 kg/m²     Physical Exam:    General:  Acutely ill appearing female, intubated & sedated  HEENT: Atraumatic, normocephalic. Dry oral mucous membranes. OG in place. Neck: Supple without adenopathy, JVD, thyromegaly or masses. Trachea midline. CV: Heart RRR, no murmurs, rubs, gallops. Resp: Even, easy without cough or accessory use. Lungs clear to ascultation bilaterally. Abd: Round, soft, obese, nontender. No hepatosplenomegaly or mass present. Active bowel sounds heard. No distention noted. Ext:  Without cyanosis, clubbing. BLE edema. Skin: Pink, warm, dry  Neuro:  Alert, oriented x 3 with no obvious deficits.        Rectal: deferred    Labs:   CBC:   Lab Results   Component Value Date    WBC 18.5 11/03/2021    HGB 8.2 11/03/2021    HCT 26.4 11/03/2021    MCV 82.2 11/03/2021     11/03/2021     BMP:   Lab Results   Component Value Date     11/03/2021    K 4.3 11/03/2021    K 5.3 10/27/2021    CL 98 11/03/2021    CO2 20 11/03/2021    PHOS 3.3 11/03/2021    BUN 54 11/03/2021    CREATININE 2.2 11/03/2021    CALCIUM 8.9 11/03/2021     PT/INR:   Lab Results   Component Value Date    INR 1.24 10/28/2021     Lipids:   Lab Results   Component Value Date    ALKPHOS 89 11/03/2021    ALT 13 11/03/2021    AST 19 11/03/2021    BILITOT 0.2 11/03/2021    BILIDIR 0.3 03/06/2020    LABALBU 2.6 11/03/2021     Significant Diagnostic Studies:   CXR 11/03/21      Impression   Worsening prominence of the pulmonary interstitium suggesting pulmonary edema versus infiltrates. Small bilateral pleural effusions. Current Meds:  Scheduled Meds:   Amiodarone HCl in Dextrose        bumetanide  1 mg IntraVENous Once    aspirin  81 mg Oral Daily    vancomycin (VANCOCIN) intermittent dosing (placeholder)   Other RX Placeholder    pantoprazole  40 mg IntraVENous QAM    sodium hypochlorite   Irrigation Daily    Riociguat  2.5 mg Oral Q8H    sodium chloride flush  5-40 mL IntraVENous 2 times per day    allopurinol  300 mg Oral Daily    [Held by provider] FLUoxetine  40 mg Oral Daily    [Held by provider] metoprolol tartrate  12.5 mg Oral BID    albuterol  2.5 mg Nebulization Q6H     Continuous Infusions:   phenylephrine (KRISTAL-SYNEPHRINE) 50mg/250mL infusion      sodium chloride      fentaNYL (SUBLIMAZE) 1250 mcg in sodium chloride 0.9 % 250 mL 100 mcg/hr (11/03/21 0405)    sodium chloride 25 mL (10/30/21 0129)    amiodarone 1 mg/min (11/03/21 0643)    heparin (PORCINE) Infusion 24 Units/kg/hr (11/03/21 0003)    [Held by provider] sodium chloride 50 mL/hr at 10/28/21 2314    midazolam 3 mg/hr (11/02/21 2007)       Assessment:  72 yo F admitted 10/27/21 for SOB. Noted to be hypoxemic & hypercapneic, emergent intubation done. Noted to have left pleural effusion, underwent thoracentesis with 1100 mL drained. Noted to have pneumonia, on ATBs. Noted to have ALVIN on CKD, nephro following. Noted to have afib with RVR, started on Amio & Heparin. CT chest noted a dilated CBD at 10 mm & large gallstone present therefore GI was consulted. US RUQ demonstrated cholelithiasis & pericholecystic fluid suspicious for acute cholecystitis, ductal dilatation. LFTs WNL.      1. Cholelithiasis- noted on imaging  2. Questionable CBD dilatation- LFTs WNL  3.  Acute hypoxic respiratory failure- intubated  4. ALVIN on CKD  5. Left pleural effusion s/p thoracentesis  6. PNA- on ATBs  7. MERCEDES  8. Afib- on Amio & Heparin  9. Acute on chronic anemia- no GI bleeding noted, s/p 1 unit PRBC  10. Left staghorn calculus noted on imaging s/p nephrostomy tube  11. CHF  12. Afib with RVR- on Amio & Heparin gtts   13.  Acute hematuria    Plan:    · Monitor H & H, transfuse prn  · Continue PPI daily  · Do not suspect choledocholithiasis, LFTs WNL  · If concern for acute cholecystitis, recommend HIDA scan and possible surgical consult  · Nursing to monitor stool output & document  · ATBs per primary  · Nephrology & urology on board  · ICU supportive care  · Will need a 5 week follow-up with Naun SHERMAN  GI signing off    Case reviewed and impression/plan reviewed in collaboration with Dr. Keysha Herrera  Electronically signed by LANE Riggins - CNP on 11/3/2021 at 12:36 PM    [facility]

## 2021-11-03 NOTE — PROCEDURES
Echocardiogram/DCCV    Indication: Atrial fibrillation with rapid response in the setting of hypotension MAP 50 on pressors    Informed Consent:  The indication, risks and benefits of the procedure and possible therapeutic consequences and alternatives were discussed with the patient's family as the patient was not consentable due to sedation/acute illness. Risks of the procedure include but are not limited to the following: Bleeding, infection, pain and discomfort, rhythm disturbance, low blood pressure, myocardial infarction, stroke, kidney damage/failure, allergic reactions to sedatives, loss of pulse/vascular compromise requiring surgery, needing blood transfusion, requiring emergent open heart surgery or emergent coronary intervention, the need for intubation/respiratory support, the requirement for defibrillation/cardioversion, aspiration pneumonia, skin burns, malignant dysrhythmias and death. Alternatives to and omission of the suggested procedure were discussed. Patient's family had no further questions and wished to proceed; the consent form was signed. Sedation:  50mcg Fentanyl 5mg Versed      Procedure:  Under continuous hemodynamic and respiratory monitoring, the patient was given Fentanyl IV and Versed IV till sedation was judged to be appropriate based on BP, RR, presence of gag reflex, and neurologic status. No reversal agents given. Thereafter, pads were applied to the patients chest in the standard position. A biphasic, 360 J shock was delivered x 1. The patient converted to sinus rhythm. ECG performed. Post Procedure: Orders placed, monitor x 4 hours    Summary:  Successful, uncomplicated TOBIAS-DCCV with moderate sedation. Plan:    1) Continue Ventilation/Sedation  2) Continue Heparin Drip  3) Switch pressor support levophed phenylephrine  4) Will assess neurologic status    Case and findings discussed with the patient and family.   They were agreeable an amenable to the plan.    Signed Elizabeth Chavez DO PGY-2    I was proctored by Dr Deborah Gerardo who present during the procedure  Electronically signed by Bianca Loo.  Deborah Gerardo MD.

## 2021-11-03 NOTE — PROGRESS NOTES
CRITICAL CARE PROGRESS NOTE      Patient:  Cb Kenny    Unit/Bed:4D-09/009-A  YOB: 1950  MRN: [id number]   PCP: Elinor Paulson MD  Date of Admission: 10/27/2021  Chief Complaint:- Shortness of breath    Assessment and Plan:    Acute combined hypercapnic and hypoxic respiratory failure: Secondary to severe pneumonia with left-sided pleural effusion. Currently on pressure control ventilation with Pressure control 14 PEEP 6 FiO2 30%. Will attempt SBP if patient continues to improve  Severe bilateral multiorganism pneumonia: PCR positive Staph w/ MECA gene and adenovirus. Culture growing MSSA. Day 7/7 Vancomycin and completed 5 days Rocephin. Septic Shock: 2/2 severe PNA. CI 6.3 Currently on pressor support w/ levophed 5mcg  Bilateral Pleural Effusion:  2/2 PNA per above. Interval enlargement noted on CT 11/1/21. Improvemed in concomitant pericardial effusion  New onset atrial fibrillation with rapid ventricular response: Suspect related to LA dilation in context of pulmonary HTN. Currently on heparin and amio drip now titrated to 60mg/hr. Bridge to Eliquis once stable. Holding metoprolol 2/2 hypotension  PAH/chronic RV failure NYHA II: EF 55 to 60% G2 DD TTE 5/4/2021. RHC present. Vernestine Regina catheter shows improvement in PA pressures MPAP <32. Treated w/ Riociguat. Hydronephrosis 2/2 Left-sided staghorn calculi: Urology following. Percutaneous drainage tube 11/2/21 IR. Low output following tube placement. Awaiting recs from urology and nephrology   Hematuria: s/p blood loss  MERCEDES: hematuria s/p perc tube placement per above s/p 1 unit PRBC 10/31/21  Stage II ALVIN on CKD 3: Suspect post renal etiology 2/2 staghorn calculi. Nephrology following. No plans for HD at this time. Of note patient does have baseline CKD positive RYLAN screen suggests underlying autoimmune etiology. GBM antibodies negative, C3/C4 levels normal, elevated kappa/lambda free light chains with normal ratio.  Holding nephrotoxic agents  Cholelithiasis: w/ dilated CBD. Noted on 10/30/2021 US liver/GB. GI following. Does not suspect choledocolithiasis at this time. May consider HIDA scan once stable   Stage III decubitus ulcer with inferior tunneling: S/p excision VAC placement 8/21. Low suspicion for active infection  Chronic tobacco use:   cessation  Mild AS: Noted on previous TTE not appreciated on repeat imaging  Gout:  W/o exacerbation. Continue allopurinol  BARBER: non compliant w/ CPAP  Hyperkalemia (resolved): 2/2 ALVIN      INITIAL H AND P AND ICU COURSE:  71F admitted [facility] 10/27/21 w/ SOB. Current smoker w/ PMH PAH grp 3, HFpEF, stage III sacral ulcer s/p wound ostomy 9/21. Patient sister reports SpO2 51% at home and was brought to ED where ABG showed pH 7.19, PCO2 80, PO2 134. She required emergent intubation and was transferred to ICU. Workup revealed left sided pleural effusion and underwent US guided thoracentesis w/ 1100 cc removed consistent w/ MRSA/adenovirus. Patient was noted to be in afib/RVR and was placed on amio, heparin drip. Patient was also noted to have normocytic anemia and received 1 unit PRBC 10/31/21. CT abdomen revealed CBD dilation w/ cholelithiasis. 11/1/2021: Plan for nephrostomy tubes today. Hemoglobin 7.7 s/p 1 unit PRBC yesterday. Weaned to 4 mics Levophed. 11/2/2021: Patient resumed back on prior dose of vancomycin. Low urine output w/ increasing pressor requirements today    11/3/2021: Will attempt SBT if able to wean today. Increased Amio drip 2/2 worsening tachycardia. Continued hematuria w/ low urine output. Cr stable. Will evaluate UTI following perc tube placement. Rise in WBC noted. Culture pending      Past Medical History:  Per HPI. Family History:  DM in father and sister. Social History: chronic smoker.     ROS   Unable to obtain a comprehensive ROS as patient sedated and intubated     Scheduled Meds:   amiodarone bolus  150 mg IntraVENous Once    Amiodarone HCl in Dextrose        aspirin  81 mg Oral Daily    vancomycin (VANCOCIN) intermittent dosing (placeholder)   Other RX Placeholder    pantoprazole  40 mg IntraVENous QAM    sodium hypochlorite   Irrigation Daily    Riociguat  2.5 mg Oral Q8H    sodium chloride flush  5-40 mL IntraVENous 2 times per day    allopurinol  300 mg Oral Daily    [Held by provider] FLUoxetine  40 mg Oral Daily    [Held by provider] metoprolol tartrate  12.5 mg Oral BID    albuterol  2.5 mg Nebulization Q6H     Continuous Infusions:   sodium chloride      fentaNYL (SUBLIMAZE) 1250 mcg in sodium chloride 0.9 % 250 mL 100 mcg/hr (11/03/21 0405)    sodium chloride 25 mL (10/30/21 0129)    amiodarone 1 mg/min (11/03/21 0643)    heparin (PORCINE) Infusion 24 Units/kg/hr (11/03/21 0003)    norepinephrine 5 mcg/min (11/03/21 0143)    [Held by provider] sodium chloride 50 mL/hr at 10/28/21 2314    midazolam 3 mg/hr (11/02/21 2007)       PHYSICAL EXAMINATION:  T:  97.7.  P:  115. RR:  18. B/P:  118/64. FiO2:  30. O2 Sat:  97%. I/O:  1853/830  Body mass index is 40.74 kg/m². GCS:   8  PC: 14 PEEP: 6: TV: 400: RRTotal: 16: Ti:1 sec  General: Acute on chronically ill-appearing elderly white female  HEENT:  normocephalic and atraumatic. No scleral icterus. PERR  Neck: supple. No Thyromegaly. Lungs: clear to auscultation. No retractions  Cardiac: Irregular tachycardiac. No JVD. Abdomen: soft. Nontender. Extremities:  No clubbing, cyanosis, or edema x 4. Vasculature: capillary refill < 3 seconds. Palpable dorsalis pedis pulses. Skin:  warm and dry. Psych: Unable to assess as patient is currently sedated and on mechanical ventilation  Lymph:  No supraclavicular adenopathy. Neurologic:  No focal deficit. No seizures.     Lines  CVC placed 10/27/2021  [place] catheter placed 10/29/2021  Padilla placed 10/27/2021  Intubated 10/27/2021  Arterial line 10/29/2021    Data: (All radiographs, tracings, PFTs, and imaging are personally viewed and interpreted unless otherwise noted). Sodium 134 potassium 4.8 chloride 99 bicarb 28 BUN/creatinine 50/2.2 anion gap 15 creatinine 2.2 GFR 22 magnesium 1.8 glucose 130 calcium 9.1 phosphorus 3.6 total protein 5.3  WBC 13.5 H&H 8.1/27.4 12.8 H&H 8.3/27.3 platelets 727  Telemetry shows rate controlled atrial fibrillation   Chest x-ray 10/31/2021 demonstrated diffuse bilateral patchy opacities with left-sided pleural effusion and cardiomegaly  Hemodynamics 11/1/2021 CI 6.3, SVRI 770, CVP 21, PAP 53/29  Respiratory culture collected on 10/28/2021-MSSA  Pneumonia panel collected 10/27/2021 demonstrates staph aureus. MEC-A gene and adenovirus  CT chest on 11/1/2021 demonstrates bilateral pleural effusions with interval enlargement         Seen with multidisciplinary ICU team.  Meets Continued ICU Level Care Criteria:    [x] Yes   [] No - Transfer Planned to listed location:  [] HOSPITALIST CONTACTED-      Case and plan discussed with Dr. Chapman Jay Jay. Electronically signed by Radha Ontiveros DO PGY-2  CRITICAL CARE SPECIALIST  Patient seen by me. Case discussed with resident physician. Continue with spontaneous breathing trial.  Currently on pressure control 14. Continue to wean support as tolerated. Italicized font represents changes to the note made by me. CC time 35 minutes. Time was discontiguous. Time does not include procedures. Time does include my direct assessment of the patient and coordination of care.   Electronically signed by Cesario Benavides MD on 11/3/2021 at 8:13 AM

## 2021-11-04 ENCOUNTER — APPOINTMENT (OUTPATIENT)
Dept: CT IMAGING | Age: 71
DRG: 004 | End: 2021-11-04
Payer: MEDICARE

## 2021-11-04 LAB
ABO: NORMAL
ACT TEG: 128 SECONDS (ref 86–118)
ALBUMIN SERPL-MCNC: 2.6 G/DL (ref 3.5–5.1)
ALP BLD-CCNC: 86 U/L (ref 38–126)
ALT SERPL-CCNC: 11 U/L (ref 11–66)
ANGLE, RAPID TEG: 77.5 DEG (ref 64–80)
ANION GAP SERPL CALCULATED.3IONS-SCNC: 13 MEQ/L (ref 8–16)
ANTIBODY SCREEN: NORMAL
APTT: 88.9 SECONDS (ref 22–38)
AST SERPL-CCNC: 13 U/L (ref 5–40)
ATYPICAL LYMPHOCYTES: ABNORMAL %
BASOPHILIA: ABNORMAL
BASOPHILIC STIPPLING: ABNORMAL
BASOPHILS # BLD: 0.3 %
BASOPHILS ABSOLUTE: 0.2 THOU/MM3 (ref 0–0.1)
BILIRUB SERPL-MCNC: 0.2 MG/DL (ref 0.3–1.2)
BUN BLDV-MCNC: 54 MG/DL (ref 7–22)
CALCIUM IONIZED: 1.33 MMOL/L (ref 1.12–1.32)
CALCIUM SERPL-MCNC: 8.5 MG/DL (ref 8.5–10.5)
CHLORIDE BLD-SCNC: 97 MEQ/L (ref 98–111)
CO2: 20 MEQ/L (ref 23–33)
CREAT SERPL-MCNC: 2.4 MG/DL (ref 0.4–1.2)
ELLIPTOCYTES: ABNORMAL
EOSINOPHIL # BLD: 0 %
EOSINOPHILS ABSOLUTE: 0 THOU/MM3 (ref 0–0.4)
EPL-TEG: 0.1 % (ref 0–15)
ERYTHROCYTE [DISTWIDTH] IN BLOOD BY AUTOMATED COUNT: 17.4 % (ref 11.5–14.5)
ERYTHROCYTE [DISTWIDTH] IN BLOOD BY AUTOMATED COUNT: 17.7 % (ref 11.5–14.5)
ERYTHROCYTE [DISTWIDTH] IN BLOOD BY AUTOMATED COUNT: 52.7 FL (ref 35–45)
ERYTHROCYTE [DISTWIDTH] IN BLOOD BY AUTOMATED COUNT: 53.6 FL (ref 35–45)
FIBRIN SPLIT PRODUCTS: > 20 MCG/ML
FIBRINOGEN: 386 MG/100ML (ref 155–475)
GFR SERPL CREATININE-BSD FRML MDRD: 20 ML/MIN/1.73M2
GLUCOSE BLD-MCNC: 151 MG/DL (ref 70–108)
HCT VFR BLD CALC: 22.9 % (ref 37–47)
HCT VFR BLD CALC: 24.4 % (ref 37–47)
HCT VFR BLD CALC: 24.7 % (ref 37–47)
HCT VFR BLD CALC: 25.2 % (ref 37–47)
HCT VFR BLD CALC: 26.2 % (ref 37–47)
HEMOGLOBIN: 6.6 GM/DL (ref 12–16)
HEMOGLOBIN: 7.5 GM/DL (ref 12–16)
HEMOGLOBIN: 7.6 GM/DL (ref 12–16)
HEMOGLOBIN: 7.8 GM/DL (ref 12–16)
HEMOGLOBIN: 8 GM/DL (ref 12–16)
HEPARIN THERAPY: NO
HEPARIN UNFRACTIONATED: < 0.04 U/ML (ref 0.3–0.7)
IMMATURE GRANS (ABS): 1.4 THOU/MM3 (ref 0–0.07)
IMMATURE GRANULOCYTES: 2.7 %
INR BLD: 1.21 (ref 0.85–1.13)
KINETICS RAPID TEG: 0.9 MINUTES (ref 0.5–2.3)
LY30 (LYSIS) TEG: 0.1 % (ref 0–7.5)
LYMPHOCYTES # BLD: 2.3 %
LYMPHOCYTES ABSOLUTE: 1.2 THOU/MM3 (ref 1–4.8)
MA(MAX CLOT) RAPID TEG: 73 MM (ref 52–71)
MAGNESIUM: 1.6 MG/DL (ref 1.6–2.4)
MCH RBC QN AUTO: 26.4 PG (ref 26–33)
MCH RBC QN AUTO: 26.5 PG (ref 26–33)
MCHC RBC AUTO-ENTMCNC: 30.5 GM/DL (ref 32.2–35.5)
MCHC RBC AUTO-ENTMCNC: 31 GM/DL (ref 32.2–35.5)
MCV RBC AUTO: 85.7 FL (ref 81–99)
MCV RBC AUTO: 86.5 FL (ref 81–99)
MONOCYTES # BLD: 4.1 %
MONOCYTES ABSOLUTE: 2.1 THOU/MM3 (ref 0.4–1.3)
NUCLEATED RED BLOOD CELLS: 0 /100 WBC
PHOSPHORUS: 4.4 MG/DL (ref 2.4–4.7)
PLATELET # BLD: 142 THOU/MM3 (ref 130–400)
PLATELET # BLD: 156 THOU/MM3 (ref 130–400)
PLATELET ESTIMATE: ADEQUATE
PMV BLD AUTO: 10 FL (ref 9.4–12.4)
PMV BLD AUTO: 10 FL (ref 9.4–12.4)
POIKILOCYTES: ABNORMAL
POTASSIUM SERPL-SCNC: 4.4 MEQ/L (ref 3.5–5.2)
RBC # BLD: 2.94 MILL/MM3 (ref 4.2–5.4)
RBC # BLD: 3.03 MILL/MM3 (ref 4.2–5.4)
REACTION TIME RAPID TEG: 0.8 MINUTES (ref 0.4–1)
REASON FOR REJECTION: NORMAL
REJECTED TEST: NORMAL
RH FACTOR: NORMAL
SEG NEUTROPHILS: 90.6 %
SEGMENTED NEUTROPHILS ABSOLUTE COUNT: 46.2 THOU/MM3 (ref 1.8–7.7)
SMUDGE CELLS: PRESENT
SODIUM BLD-SCNC: 130 MEQ/L (ref 135–145)
TOTAL PROTEIN: 5.3 G/DL (ref 6.1–8)
URINE CULTURE, ROUTINE: NORMAL
VANCOMYCIN RANDOM: 20 UG/ML (ref 0.1–39.9)
WBC # BLD: 46.2 THOU/MM3 (ref 4.8–10.8)
WBC # BLD: 51 THOU/MM3 (ref 4.8–10.8)

## 2021-11-04 PROCEDURE — 85362 FIBRIN DEGRADATION PRODUCTS: CPT

## 2021-11-04 PROCEDURE — 99232 SBSQ HOSP IP/OBS MODERATE 35: CPT | Performed by: NURSE PRACTITIONER

## 2021-11-04 PROCEDURE — 85520 HEPARIN ASSAY: CPT

## 2021-11-04 PROCEDURE — 85730 THROMBOPLASTIN TIME PARTIAL: CPT

## 2021-11-04 PROCEDURE — 82330 ASSAY OF CALCIUM: CPT

## 2021-11-04 PROCEDURE — 94640 AIRWAY INHALATION TREATMENT: CPT

## 2021-11-04 PROCEDURE — 85025 COMPLETE CBC W/AUTO DIFF WBC: CPT

## 2021-11-04 PROCEDURE — 99233 SBSQ HOSP IP/OBS HIGH 50: CPT | Performed by: INTERNAL MEDICINE

## 2021-11-04 PROCEDURE — 85385 FIBRINOGEN ANTIGEN: CPT

## 2021-11-04 PROCEDURE — 6370000000 HC RX 637 (ALT 250 FOR IP): Performed by: FAMILY MEDICINE

## 2021-11-04 PROCEDURE — 85610 PROTHROMBIN TIME: CPT

## 2021-11-04 PROCEDURE — 85018 HEMOGLOBIN: CPT

## 2021-11-04 PROCEDURE — P9016 RBC LEUKOCYTES REDUCED: HCPCS

## 2021-11-04 PROCEDURE — 2580000003 HC RX 258: Performed by: EMERGENCY MEDICINE

## 2021-11-04 PROCEDURE — 84100 ASSAY OF PHOSPHORUS: CPT

## 2021-11-04 PROCEDURE — 86901 BLOOD TYPING SEROLOGIC RH(D): CPT

## 2021-11-04 PROCEDURE — 86923 COMPATIBILITY TEST ELECTRIC: CPT

## 2021-11-04 PROCEDURE — 6360000002 HC RX W HCPCS: Performed by: EMERGENCY MEDICINE

## 2021-11-04 PROCEDURE — 94003 VENT MGMT INPAT SUBQ DAY: CPT

## 2021-11-04 PROCEDURE — 83735 ASSAY OF MAGNESIUM: CPT

## 2021-11-04 PROCEDURE — 2000000000 HC ICU R&B

## 2021-11-04 PROCEDURE — 85027 COMPLETE CBC AUTOMATED: CPT

## 2021-11-04 PROCEDURE — 2580000003 HC RX 258: Performed by: STUDENT IN AN ORGANIZED HEALTH CARE EDUCATION/TRAINING PROGRAM

## 2021-11-04 PROCEDURE — 6360000002 HC RX W HCPCS: Performed by: STUDENT IN AN ORGANIZED HEALTH CARE EDUCATION/TRAINING PROGRAM

## 2021-11-04 PROCEDURE — 2500000003 HC RX 250 WO HCPCS: Performed by: NURSE PRACTITIONER

## 2021-11-04 PROCEDURE — 86850 RBC ANTIBODY SCREEN: CPT

## 2021-11-04 PROCEDURE — 6370000000 HC RX 637 (ALT 250 FOR IP): Performed by: STUDENT IN AN ORGANIZED HEALTH CARE EDUCATION/TRAINING PROGRAM

## 2021-11-04 PROCEDURE — C9113 INJ PANTOPRAZOLE SODIUM, VIA: HCPCS | Performed by: NURSE PRACTITIONER

## 2021-11-04 PROCEDURE — 80053 COMPREHEN METABOLIC PANEL: CPT

## 2021-11-04 PROCEDURE — P9047 ALBUMIN (HUMAN), 25%, 50ML: HCPCS | Performed by: STUDENT IN AN ORGANIZED HEALTH CARE EDUCATION/TRAINING PROGRAM

## 2021-11-04 PROCEDURE — 2500000003 HC RX 250 WO HCPCS: Performed by: STUDENT IN AN ORGANIZED HEALTH CARE EDUCATION/TRAINING PROGRAM

## 2021-11-04 PROCEDURE — 6360000002 HC RX W HCPCS: Performed by: FAMILY MEDICINE

## 2021-11-04 PROCEDURE — 86900 BLOOD TYPING SEROLOGIC ABO: CPT

## 2021-11-04 PROCEDURE — 36415 COLL VENOUS BLD VENIPUNCTURE: CPT

## 2021-11-04 PROCEDURE — 85014 HEMATOCRIT: CPT

## 2021-11-04 PROCEDURE — 51700 IRRIGATION OF BLADDER: CPT

## 2021-11-04 PROCEDURE — 2580000003 HC RX 258: Performed by: NURSE PRACTITIONER

## 2021-11-04 PROCEDURE — 80202 ASSAY OF VANCOMYCIN: CPT

## 2021-11-04 PROCEDURE — 51702 INSERT TEMP BLADDER CATH: CPT

## 2021-11-04 PROCEDURE — 74176 CT ABD & PELVIS W/O CONTRAST: CPT

## 2021-11-04 PROCEDURE — 6360000002 HC RX W HCPCS: Performed by: NURSE PRACTITIONER

## 2021-11-04 RX ORDER — ALBUMIN (HUMAN) 12.5 G/50ML
25 SOLUTION INTRAVENOUS ONCE
Status: COMPLETED | OUTPATIENT
Start: 2021-11-04 | End: 2021-11-04

## 2021-11-04 RX ORDER — SODIUM CHLORIDE 9 MG/ML
INJECTION, SOLUTION INTRAVENOUS PRN
Status: DISCONTINUED | OUTPATIENT
Start: 2021-11-04 | End: 2021-11-08

## 2021-11-04 RX ADMIN — SODIUM CHLORIDE, PRESERVATIVE FREE 10 ML: 5 INJECTION INTRAVENOUS at 21:40

## 2021-11-04 RX ADMIN — ALBUTEROL SULFATE 2.5 MG: 2.5 SOLUTION RESPIRATORY (INHALATION) at 12:05

## 2021-11-04 RX ADMIN — AMIODARONE HYDROCHLORIDE 1 MG/MIN: 1.8 INJECTION, SOLUTION INTRAVENOUS at 01:00

## 2021-11-04 RX ADMIN — PHENYLEPHRINE HYDROCHLORIDE 225 MCG/MIN: 10 INJECTION INTRAVENOUS at 09:39

## 2021-11-04 RX ADMIN — AMIODARONE HYDROCHLORIDE 0.5 MG/MIN: 1.8 INJECTION, SOLUTION INTRAVENOUS at 07:13

## 2021-11-04 RX ADMIN — AMIODARONE HYDROCHLORIDE 0.5 MG/MIN: 1.8 INJECTION, SOLUTION INTRAVENOUS at 20:14

## 2021-11-04 RX ADMIN — PHENYLEPHRINE HYDROCHLORIDE 300 MCG/MIN: 10 INJECTION INTRAVENOUS at 05:44

## 2021-11-04 RX ADMIN — FENTANYL CITRATE 75 MCG/HR: 50 INJECTION INTRAMUSCULAR; INTRAVENOUS at 06:39

## 2021-11-04 RX ADMIN — POLYETHYLENE GLYCOL 3350 17 G: 17 POWDER, FOR SOLUTION ORAL at 07:47

## 2021-11-04 RX ADMIN — PANTOPRAZOLE SODIUM 40 MG: 40 INJECTION, POWDER, FOR SOLUTION INTRAVENOUS at 07:48

## 2021-11-04 RX ADMIN — ALBUTEROL SULFATE 2.5 MG: 2.5 SOLUTION RESPIRATORY (INHALATION) at 18:16

## 2021-11-04 RX ADMIN — SENNOSIDES 8.6 MG: 8.6 TABLET, COATED ORAL at 07:48

## 2021-11-04 RX ADMIN — PHENYLEPHRINE HYDROCHLORIDE 300 MCG/MIN: 10 INJECTION INTRAVENOUS at 02:35

## 2021-11-04 RX ADMIN — ALBUMIN (HUMAN) 25 G: 0.25 INJECTION, SOLUTION INTRAVENOUS at 06:40

## 2021-11-04 RX ADMIN — SODIUM CHLORIDE, PRESERVATIVE FREE 10 ML: 5 INJECTION INTRAVENOUS at 07:48

## 2021-11-04 RX ADMIN — ASPIRIN 81 MG CHEWABLE TABLET 81 MG: 81 TABLET CHEWABLE at 07:47

## 2021-11-04 RX ADMIN — VASOPRESSIN 0.04 UNITS/MIN: 20 INJECTION INTRAVENOUS at 02:08

## 2021-11-04 RX ADMIN — ALBUTEROL SULFATE 2.5 MG: 2.5 SOLUTION RESPIRATORY (INHALATION) at 06:02

## 2021-11-04 RX ADMIN — SODIUM HYPOCHLORITE: 1.25 SOLUTION TOPICAL at 08:05

## 2021-11-04 RX ADMIN — PHENYLEPHRINE HYDROCHLORIDE 225 MCG/MIN: 10 INJECTION INTRAVENOUS at 09:56

## 2021-11-04 RX ADMIN — ALBUTEROL SULFATE 2.5 MG: 2.5 SOLUTION RESPIRATORY (INHALATION) at 01:26

## 2021-11-04 RX ADMIN — VASOPRESSIN 0.04 UNITS/MIN: 20 INJECTION INTRAVENOUS at 09:56

## 2021-11-04 RX ADMIN — ALLOPURINOL 300 MG: 300 TABLET ORAL at 07:48

## 2021-11-04 RX ADMIN — VASOPRESSIN 0.04 UNITS/MIN: 20 INJECTION INTRAVENOUS at 20:14

## 2021-11-04 RX ADMIN — ALBUTEROL SULFATE 2.5 MG: 2.5 SOLUTION RESPIRATORY (INHALATION) at 23:45

## 2021-11-04 ASSESSMENT — PULMONARY FUNCTION TESTS
PIF_VALUE: 17
PIF_VALUE: 18
PIF_VALUE: 18
PIF_VALUE: 21
PIF_VALUE: 17
PIF_VALUE: 17

## 2021-11-04 NOTE — PROGRESS NOTES
[facility]         Renal Inpatient Follow-Up note         11/4/2021 10:11 AM    Pt Name:   Lyssa Longoria  YOB: 1950  Attending:   Srikanth Hernandez MD    Chief Complaint : Lyssa Longoria is a 73 y.o. female being followed by nephrology for ALVIN/CKD    Interval History :   Patient seen and examined by me. No distress  Intubated cannot assess history or review of systems  Still having almost myesha blood from the Padilla and the nephrostomy tube, Padilla is creating notes lately  She is on a heparin drip as well. She is 16 L positive.   Continues to be on pressors, on 2 pressors right now     Scheduled Medications :    tiotropium-olodaterol  2 puff Inhalation Daily    aspirin  81 mg Oral Daily    vancomycin (VANCOCIN) intermittent dosing (placeholder)   Other RX Placeholder    pantoprazole  40 mg IntraVENous QAM    sodium hypochlorite   Irrigation Daily    Riociguat  2.5 mg Oral Q8H    sodium chloride flush  5-40 mL IntraVENous 2 times per day    allopurinol  300 mg Oral Daily    [Held by provider] FLUoxetine  40 mg Oral Daily    [Held by provider] metoprolol tartrate  12.5 mg Oral BID    albuterol  2.5 mg Nebulization Q6H      sodium chloride      vasopressin (Septic Shock) infusion 0.04 Units/min (11/04/21 0956)    phenylephrine (KRISTAL-SYNEPHRINE) 250 mg/250 mL infusion 225 mcg/min (11/04/21 0956)    phenylephrine (KRISTAL-SYNEPHRINE) 50mg/250mL infusion 225 mcg/min (11/04/21 0939)    sodium chloride      fentaNYL (SUBLIMAZE) 1250 mcg in sodium chloride 0.9 % 250 mL 50 mcg/hr (11/04/21 0957)    sodium chloride 25 mL (10/30/21 0129)    amiodarone 0.5 mg/min (11/04/21 0713)    [Held by provider] heparin (PORCINE) Infusion Stopped (11/04/21 0134)    [Held by provider] sodium chloride 50 mL/hr at 10/28/21 2314    midazolam 1 mg/hr (11/04/21 0803)       Vitals :  BP (!) 117/47   Pulse 75   Temp 97.9 °F (36.6 °C) (Core)   Resp 10   Ht 4' 9\" (1.448 m)   Wt 202 lb 9.6 oz (91.9 kg)   SpO2 95%   BMI 43.84 kg/m²     24HR INTAKE/OUTPUT:      Intake/Output Summary (Last 24 hours) at 11/4/2021 1011  Last data filed at 11/4/2021 0800  Gross per 24 hour   Intake 4329.67 ml   Output 1535 ml   Net 2794.67 ml     Last 3 weights  Wt Readings from Last 3 Encounters:   11/04/21 202 lb 9.6 oz (91.9 kg)   10/25/21 164 lb (74.4 kg)   09/29/21 160 lb (72.6 kg)           Physical Exam :  General Appearance:  Well developed. No distress  Mouth/Throat:  Oral mucosa moist.  ET tube in place  Neck:  Supple, no JVD  Lungs:  Breath sounds: clear, diminished  Heart[de-identified]  S1,S2 heard  Abdomen:  Soft, non - tender  Musculoskeletal:  Edema -some edema noted         Last 3 CBC   Recent Labs     11/02/21  1300 11/02/21  1300 11/03/21  0145 11/04/21  0015 11/04/21  0620   WBC 13.5*  --  18.5*  --  51.0*   RBC 3.23*  --  3.21*  --  3.03*   HGB 8.1*   < > 8.2* 6.6* 8.0*   HCT 27.4*   < > 26.4* 22.9* 26.2*     --  166  --  156    < > = values in this interval not displayed. Last 3 CMP  Recent Labs     11/02/21  0420 11/03/21  0145 11/04/21  0515   * 130* 130*   K 4.8 4.3 4.4   CL 99 98 97*   CO2 20* 20* 20*   BUN 50* 54* 54*   CREATININE 2.2* 2.2* 2.4*   CALCIUM 9.1 8.9 8.5   LABALBU 2.9* 2.6* 2.6*   BILITOT <0.2* 0.2* 0.2*             ASSESSMENT / Plan   1. Renal -acute kidney injury, multifactorial etiology which includes hypotension from sepsis, IV contrast exposure on 10/29 and now has some hydronephrosis as well . ? At this time all the nephrotoxic agents are on hold on Levophed and blood pressure is well maintained. ? Making some urine output. Status post nephrostomy tube placement 11/1/2021  ? Bloodstained urine. Creatinine maintaining stable she is +16 L  ? Creatinine slowly rising. Restarted also appears to be worsening  ? Can give diuretics if needed she may even need renal replacement therapy     2. Electrolytes -mild hyponatremia, due to multiple hypotonic drips  3.  Mild acidosis follow for now  4. Acute hypercapnic respiratory failure  5. Pneumonia with pleural effusion  6. Pulmonary hypertension  7. Obstructive sleep apnea  8. Sepsis  9. Anemia status post blood transfusion  10. Meds reviewed and discussed with  nursing staff    Dr. Carrie Barrera MD, M,D.  [facility].

## 2021-11-04 NOTE — PROGRESS NOTES
[address]  [address] [facility] [address]  Dept: [phone]  Loc: [phone]  Visit Date: 10/27/2021  Urology Progress Note    Chief Complaint: sob  Reason for Urology  Consult: kidney stone    Subjective:   Patient is resting in bed, intubated. Brother and sister at bedside. CBI running at moderate rate, urine clear in tubing. Left nephrostomy tube with bloody drainage in bag. Heparin stopped yesterday. Urethral catheter clotted off last night, 3 way catheter placed and CBI started. HH dropped, HGB 6.6, and pt was transfused. Post transfusion HH 8.0       Urine cx from  and 10/27 are negative.     Vitals:  BP (!) 112/45   Pulse 74   Temp 97.9 °F (36.6 °C) (Core)   Resp 11   Ht 4' 9\" (1.448 m)   Wt 202 lb 9.6 oz (91.9 kg)   SpO2 95%   BMI 43.84 kg/m²   Temp  Av.5 °F (36.9 °C)  Min: 97.9 °F (36.6 °C)  Max: 99.1 °F (37.3 °C)    Intake/Output Summary (Last 24 hours) at 2021 1132  Last data filed at 2021 1000  Gross per 24 hour   Intake 4329.67 ml   Output 1735 ml   Net 2594.67 ml       Social History     Socioeconomic History    Marital status: Single     Spouse name: Not on file    Number of children: 0    Years of education: Not on file    Highest education level: Not on file   Occupational History    Not on file   Tobacco Use    Smoking status: Never Smoker    Smokeless tobacco: Never Used   Vaping Use    Vaping Use: Never used   Substance and Sexual Activity    Alcohol use: No    Drug use: No    Sexual activity: Not Currently   Other Topics Concern    Not on file   Social History Narrative    Not on file     Social Determinants of Health     Financial Resource Strain: Low Risk     Difficulty of Paying Living Expenses: Not very hard   Food Insecurity: No Food Insecurity    Worried About Running Out of Food in the Last Year: Never true    Traci of Food in the Last Year: Never true   Transportation Needs:     Lack of Transportation (Medical):  Lack of Transportation (Non-Medical):    Physical Activity:     Days of Exercise per Week:     Minutes of Exercise per Session:    Stress:     Feeling of Stress :    Social Connections:     Frequency of Communication with Friends and Family:     Frequency of Social Gatherings with Friends and Family:     Attends [employer] Services:     Active Member of Clubs or Organizations:     Attends Club or Organization Meetings:     Marital Status:    Intimate Partner Violence:     Fear of Current or Ex-Partner:     Emotionally Abused:     Physically Abused:     Sexually Abused:      Family History   Problem Relation Age of Onset    Diabetes Father     Arthritis Mother     COPD Mother     Diabetes Sister     Heart Disease Maternal Uncle     Breast Cancer Niece 36    Sleep Apnea Brother     Asthma Neg Hx     Birth Defects Neg Hx     Cancer Neg Hx     Depression Neg Hx     Early Death Neg Hx     Hearing Loss Neg Hx     High Blood Pressure Neg Hx     High Cholesterol Neg Hx     Kidney Disease Neg Hx     Learning Disabilities Neg Hx     Mental Illness Neg Hx     Mental Retardation Neg Hx     Miscarriages / Stillbirths Neg Hx     Stroke Neg Hx     Substance Abuse Neg Hx     Vision Loss Neg Hx     Other Neg Hx      No Known Allergies    Objective:    Constitutional: intubated, opens eyes to name   HEENT:   Head:         Normocephalic and atraumatic. Mucous membranes are normal.   Eyes:         EOM are normal. No scleral icterus. Nose:    The external appearance of the nose is normal  Ears: The ears appear normal to external inspection. Cardiovascular:       Normal rate, regular rhythm. Abdominal:          Soft. No tenderness. Active bowel sounds. Genitalia:    Padilla catheter draining pink tinged urine, no clots. CBI at moderate rate.   Extremities:    + generalized edema    Labs:  WBC:    Lab Results   Component Value Date    WBC 46.2 11/04/2021 Hemoglobin/Hematocrit:    Lab Results   Component Value Date    HGB 7.8 11/04/2021    HCT 25.2 11/04/2021     BMP:    Lab Results   Component Value Date     11/04/2021    K 4.4 11/04/2021    K 5.3 10/27/2021    CL 97 11/04/2021    CO2 20 11/04/2021    BUN 54 11/04/2021    LABALBU 2.6 11/04/2021    CREATININE 2.4 11/04/2021    CALCIUM 8.5 11/04/2021    LABGLOM 20 11/04/2021       Impression/Plan:    Left staghorn stone- s/p left nephrostomy tube placement 11/1/2021 in IR. Will need treated at a later time. Respiratory failure secondary to pneumonia- intubated  Afib- cardioversion 11/3  Gross hematuria- catheter clotted off last night. 3 way catheter placed, and CBI started. Urine is slightly pink tinged, CBI going at moderate rate. Left nephrostomy tube with bloody urine. Heparin drip stopped. ALVIN- worsening, nephrology on board. Acute blood loss anemia- transfused with 2 units of PRBC last night. Monitor HH, consider CT of abdomen and pelvis if drop in Hgb. Will continue to follow. LANE Boyer CNP, LANE  11/04/21 11:32 AM  Urology    ADDENDUM:    Reviewed CT results with Dr Katarzyna Rosales. Watch HH closely. No intervention needed at this time. Will continue to follow.     Electronically signed by LANE Boyer CNP on 11/4/2021 at 4:45 PM

## 2021-11-04 NOTE — FLOWSHEET NOTE
11/04/21 0400   Provider Notification   Reason for Communication Critical Value (comment)  (hgb 6. 6)   Provider Name [facility]   Provider Notification Advance Practice Clinician (CNS/NP/CNM/CRNA/PA)   Method of Communication Face to face   Response See orders   Notification Time      Patient's seaman clotted off, inserted a new one, which also clotted off. Notified [facility].  See orders

## 2021-11-04 NOTE — FLOWSHEET NOTE
11/04/21 0600   Provider Notification   Reason for Communication Critical Value (comment)  (wbc 51.0)   Provider Name Dr. Quintero Officer   Provider Notification Physician   Method of Communication Face to face   Response No new orders   Notification Time 0600     Critical value called from lab wbc 51.0.  Notified Dr. Quintero Officer MD.

## 2021-11-04 NOTE — PROGRESS NOTES
CRITICAL CARE PROGRESS NOTE      Patient:  Svetlana Brian    Unit/Bed:4D-09/009-A  YOB: 1950  MRN: [id number]   PCP: Colleen Razo MD  Date of Admission: 10/27/2021  Chief Complaint:- Shortness of breath    Assessment and Plan:    1. Acute combined hypercapnic and hypoxic respiratory failure: Secondary to severe pneumonia with left-sided pleural effusion. Currently on pressure control ventilation with Pressure control 14 PEEP 6 FiO2 30%. Trial of SBP today. Will wean sedation and attempt extubation if patient continues to improve  2. Severe bilateral multiorganism pneumonia: PCR positive Staph w/ MECA gene and adenovirus. Culture growing MSSA. Day 8/14 Vancomycin and completed 5 days Rocephin. 3. Septic Shock: 2/2 severe PNA. CI 6.3 NE d/c 2/2 worsening afib RVR. NE d/c on 11/3/21 2/2 worsening afib/RVR. Currently on pressor support w/ phenylephrine. 4. Bilateral Pleural Effusion:  2/2 PNA per above. Interval enlargement noted on CT 11/1/21. Improvemed in concomitant pericardial effusion  5. New onset atrial fibrillation with rapid ventricular response: NSR s/p DCCV 11/3/21 Holding Heparin per hematuria. Will continue Amiodarone. Bridge to Eliquis once stable. Holding metoprolol 2/2 hypotension  6. Suspected COPD: current every day smoker. Obstructive process noted on flow volume loop on ventilator. Will start empiric therapy with Stiolto  7. PAH/chronic RV failure NYHA II: EF 55 to 60% G2 DD TTE 5/4/2021. RHC present. Scott Villaseñor catheter shows improvement in PA pressures MPAP <32. Treated w/ Riociguat. 8. Hydronephrosis 2/2 Left-sided staghorn calculi: Urology following. Percutaneous drainage tube 11/2/21 IR. Low output following tube placement. Will start CBI per urology recs  9. Hematuria: s/p perc tube placement. CBI per above. Holding Heparin  10. MERCEDES: hematuria s/p perc tube placement per above s/p 1 unit PRBC 10/31/21 1 unit PRBC 11/4/21.   11. Stage II ALVIN on CKD 3: Suspect post renal etiology 2/2 staghorn calculi. Nephrology following. No plans for HD at this time. Of note patient does have baseline CKD positive RYLAN screen suggests underlying autoimmune etiology. GBM antibodies negative, C3/C4 levels normal, elevated kappa/lambda free light chains with normal ratio. Holding nephrotoxic agents  12. Cholelithiasis: w/ dilated CBD. Noted on 10/30/2021 US liver/GB. GI following. Does not suspect choledocolithiasis at this time. May consider HIDA scan once stable   13. Stage III decubitus ulcer with inferior tunneling: S/p excision VAC placement 8/21. Low suspicion for active infection  14. Chronic tobacco use:   cessation  15. Mild AS: Noted on previous TTE not appreciated on repeat imaging  16. Gout:  W/o exacerbation. Continue allopurinol  17. BARBER: non compliant w/ CPAP  18. Hyperkalemia (resolved): 2/2 ALVIN      INITIAL H AND P AND ICU COURSE:  71F admitted to [facility] 10/27/21 w/ SOB. Current smoker w/ PMH PAH grp 3, HFpEF, stage III sacral ulcer s/p wound ostomy 9/21. Patient sister reports SpO2 51% at home and was brought to ED where ABG showed pH 7.19, PCO2 80, PO2 134. She required emergent intubation and was transferred to ICU. Workup revealed left sided pleural effusion and underwent US guided thoracentesis w/ 1100 cc removed consistent w/ MRSA/adenovirus. Patient was noted to be in afib/RVR and was placed on amio, heparin drip. Patient was also noted to have normocytic anemia and received 1 unit PRBC 10/31/21. CT abdomen revealed CBD dilation w/ cholelithiasis. 11/1/2021: Plan for nephrostomy tubes today. Hemoglobin 7.7 s/p 1 unit PRBC yesterday. Weaned to 4 mics Levophed. 11/2/2021: Patient resumed back on prior dose of vancomycin. Low urine output w/ increasing pressor requirements today    11/3/2021: Will attempt SBT if able to wean today. Increased Amio drip 2/2 worsening tachycardia. Continued hematuria w/ low urine output. Cr stable.  Will evaluate UTI following perc tube placement. Rise in WBC noted. Culture pending    11/4/2021: Cardioversion on 11/3/2021. Now and NSR. CVP 29 this a.m. Suspect drop in CI 2/2 to stopping levophed. Diffuse edema following transfusion overnight. Given one-time dose of 2 mg Bumex and albumin. SBT this AM      Past Medical History:  Per HPI. Family History:  DM in father and sister. Social History: chronic smoker. ROS   Unable to obtain a comprehensive ROS as patient sedated and intubated     Scheduled Meds:   aspirin  81 mg Oral Daily    vancomycin (VANCOCIN) intermittent dosing (placeholder)   Other RX Placeholder    pantoprazole  40 mg IntraVENous QAM    sodium hypochlorite   Irrigation Daily    Riociguat  2.5 mg Oral Q8H    sodium chloride flush  5-40 mL IntraVENous 2 times per day    allopurinol  300 mg Oral Daily    [Held by provider] FLUoxetine  40 mg Oral Daily    [Held by provider] metoprolol tartrate  12.5 mg Oral BID    albuterol  2.5 mg Nebulization Q6H     Continuous Infusions:   sodium chloride      vasopressin (Septic Shock) infusion 0.04 Units/min (11/04/21 0208)    phenylephrine (KRISTAL-SYNEPHRINE) 50mg/250mL infusion 300 mcg/min (11/04/21 0235)    sodium chloride      fentaNYL (SUBLIMAZE) 1250 mcg in sodium chloride 0.9 % 250 mL 75 mcg/hr (11/03/21 1340)    sodium chloride 25 mL (10/30/21 0129)    amiodarone 1 mg/min (11/04/21 0100)    [Held by provider] heparin (PORCINE) Infusion Stopped (11/04/21 0134)    [Held by provider] sodium chloride 50 mL/hr at 10/28/21 2314    midazolam 2 mg/hr (11/03/21 2348)       PHYSICAL EXAMINATION:  T:  98.1. P:  76 RR:  11. B/P:  123/52. FiO2:  40. O2 Sat:  96%. I/O:  7367/9221  Body mass index is 43.84 kg/m². GCS:   8  PC: 12 PEEP: 6: TV: 350: RRTotal: 11: Ti:1 sec  General: Acute on chronically ill-appearing elderly white female  HEENT:  normocephalic and atraumatic. No scleral icterus. PERR  Neck: supple. No Thyromegaly. Lungs: clear to auscultation.   No retractions  Cardiac: Irregular tachycardiac. No JVD. Abdomen: soft. Nontender. Extremities:  +2 pitting edema x4. noted No clubbing, cyanosis   Vasculature: capillary refill < 3 seconds. Palpable dorsalis pedis pulses. Skin:  warm and dry. Psych: Unable to assess as patient is currently sedated and on mechanical ventilation  Lymph:  No supraclavicular adenopathy. Neurologic:  No focal deficit. No seizures. Lines  CVC placed 10/27/2021  [place]-Kasia catheter placed 10/29/2021  Padilla placed 10/27/2021  Intubated 10/27/2021  Arterial line 10/29/2021    Data: (All radiographs, tracings, PFTs, and imaging are personally viewed and interpreted unless otherwise noted).  Sodium 130 potassium 4.4 chloride 97 bicarb 20 BUN/creatinine 54/2.4 calcium 1.33 GFR 20 magnesium 1.6 glucose 151 phosphorus 4.4 total protein 5.3 Albumin 2.6 alk phos 86 ALT/AST 11/13 total bilirubin 0.2   INR 1.21 TEG  MA 73   H&H 6.6/22.9 pre transfusion repeat CBC fibrinogen pending   Telemetry shows NSR   Chest x-ray 10/31/2021 demonstrated diffuse bilateral patchy opacities with left-sided pleural effusion and cardiomegaly   Hemodynamics 11/1/2021 CI 4.8, SVRI 689, CVP 25, PAP 53/29   Respiratory culture collected on 10/28/2021-MSSA   Pneumonia panel collected 10/27/2021 demonstrates staph aureus. MEC-A gene and adenovirus   CT chest on 11/1/2021 demonstrates bilateral pleural effusions with interval enlargement   CXR 11/3/2021 demonstrates worsening pulmonary congestion with bilateral effusions   UA shows bacteria w/ moderate white cells         Seen with multidisciplinary ICU team.  Meets Continued ICU Level Care Criteria:    [x] Yes   [] No - Transfer Planned to listed location:  [] HOSPITALIST CONTACTED- DR     Case and plan discussed with Dr. Jj Rivera.         Electronically signed by Honey Fletcher DO PGY-2  CRITICAL CARE SPECIALIST

## 2021-11-04 NOTE — FLOWSHEET NOTE
11/04/21 0000   Provider Notification   Reason for Communication Critical Value (comment)  (hgb 6. 6)   Provider Name Cesar Ballesteros   Provider Notification Advance Practice Clinician (CNS/NP/CNM/CRNA/PA)   Method of Communication Face to face   Response See orders   Notification Time 0015     Patient Hgb 6.6, notified Cesar Ballesteros CNP.  See orders

## 2021-11-05 ENCOUNTER — APPOINTMENT (OUTPATIENT)
Dept: GENERAL RADIOLOGY | Age: 71
DRG: 004 | End: 2021-11-05
Payer: MEDICARE

## 2021-11-05 LAB
ALBUMIN SERPL-MCNC: 2.4 G/DL (ref 3.5–5.1)
ALBUMIN SERPL-MCNC: 2.5 G/DL (ref 3.5–5.1)
ALBUMIN SERPL-MCNC: 2.6 G/DL (ref 3.5–5.1)
ALP BLD-CCNC: 119 U/L (ref 38–126)
ALP BLD-CCNC: 128 U/L (ref 38–126)
ALP BLD-CCNC: 130 U/L (ref 38–126)
ALT SERPL-CCNC: 12 U/L (ref 11–66)
ALT SERPL-CCNC: 12 U/L (ref 11–66)
ALT SERPL-CCNC: 13 U/L (ref 11–66)
ANION GAP SERPL CALCULATED.3IONS-SCNC: 14 MEQ/L (ref 8–16)
ANION GAP SERPL CALCULATED.3IONS-SCNC: 15 MEQ/L (ref 8–16)
ANION GAP SERPL CALCULATED.3IONS-SCNC: 15 MEQ/L (ref 8–16)
ANION GAP SERPL CALCULATED.3IONS-SCNC: 16 MEQ/L (ref 8–16)
ANISOCYTOSIS: PRESENT
AST SERPL-CCNC: 14 U/L (ref 5–40)
AST SERPL-CCNC: 15 U/L (ref 5–40)
AST SERPL-CCNC: 15 U/L (ref 5–40)
BASE EXCESS MIXED: -6.6 MMOL/L (ref -2–3)
BASOPHILIA: ABNORMAL
BASOPHILIC STIPPLING: ABNORMAL
BASOPHILS # BLD: 0.2 %
BASOPHILS ABSOLUTE: 0.1 THOU/MM3 (ref 0–0.1)
BILIRUB SERPL-MCNC: 0.2 MG/DL (ref 0.3–1.2)
BILIRUB SERPL-MCNC: 0.3 MG/DL (ref 0.3–1.2)
BILIRUB SERPL-MCNC: 0.3 MG/DL (ref 0.3–1.2)
BUN BLDV-MCNC: 49 MG/DL (ref 7–22)
BUN BLDV-MCNC: 57 MG/DL (ref 7–22)
BUN BLDV-MCNC: 60 MG/DL (ref 7–22)
BUN BLDV-MCNC: 60 MG/DL (ref 7–22)
CALCIUM IONIZED: 1.3 MMOL/L (ref 1.12–1.32)
CALCIUM IONIZED: 1.33 MMOL/L (ref 1.12–1.32)
CALCIUM IONIZED: 1.33 MMOL/L (ref 1.12–1.32)
CALCIUM SERPL-MCNC: 8.6 MG/DL (ref 8.5–10.5)
CALCIUM SERPL-MCNC: 8.8 MG/DL (ref 8.5–10.5)
CALCIUM SERPL-MCNC: 8.9 MG/DL (ref 8.5–10.5)
CALCIUM SERPL-MCNC: 9 MG/DL (ref 8.5–10.5)
CHLORIDE BLD-SCNC: 92 MEQ/L (ref 98–111)
CHLORIDE BLD-SCNC: 92 MEQ/L (ref 98–111)
CHLORIDE BLD-SCNC: 93 MEQ/L (ref 98–111)
CHLORIDE BLD-SCNC: 95 MEQ/L (ref 98–111)
CO2: 18 MEQ/L (ref 23–33)
CO2: 18 MEQ/L (ref 23–33)
CO2: 19 MEQ/L (ref 23–33)
CO2: 19 MEQ/L (ref 23–33)
COLLECTED BY:: ABNORMAL
CREAT SERPL-MCNC: 2.4 MG/DL (ref 0.4–1.2)
CREAT SERPL-MCNC: 2.7 MG/DL (ref 0.4–1.2)
CREAT SERPL-MCNC: 2.8 MG/DL (ref 0.4–1.2)
CREAT SERPL-MCNC: 2.8 MG/DL (ref 0.4–1.2)
CRENATED RBC'S: ABNORMAL
DEVICE: ABNORMAL
DIFFERENTIAL TYPE: ABNORMAL
ELLIPTOCYTES: ABNORMAL
EOSINOPHIL # BLD: 0.1 %
EOSINOPHILS ABSOLUTE: 0 THOU/MM3 (ref 0–0.4)
ERYTHROCYTE [DISTWIDTH] IN BLOOD BY AUTOMATED COUNT: 18.5 % (ref 11.5–14.5)
ERYTHROCYTE [DISTWIDTH] IN BLOOD BY AUTOMATED COUNT: 18.6 % (ref 11.5–14.5)
ERYTHROCYTE [DISTWIDTH] IN BLOOD BY AUTOMATED COUNT: 18.9 % (ref 11.5–14.5)
ERYTHROCYTE [DISTWIDTH] IN BLOOD BY AUTOMATED COUNT: 55.7 FL (ref 35–45)
ERYTHROCYTE [DISTWIDTH] IN BLOOD BY AUTOMATED COUNT: 56.1 FL (ref 35–45)
ERYTHROCYTE [DISTWIDTH] IN BLOOD BY AUTOMATED COUNT: 57.5 FL (ref 35–45)
FIO2, MIXED VENOUS: 40
GFR SERPL CREATININE-BSD FRML MDRD: 17 ML/MIN/1.73M2
GFR SERPL CREATININE-BSD FRML MDRD: 20 ML/MIN/1.73M2
GLUCOSE BLD-MCNC: 130 MG/DL (ref 70–108)
GLUCOSE BLD-MCNC: 135 MG/DL (ref 70–108)
GLUCOSE BLD-MCNC: 135 MG/DL (ref 70–108)
GLUCOSE BLD-MCNC: 137 MG/DL (ref 70–108)
HCO3, MIXED: 21 MMOL/L (ref 23–28)
HCT VFR BLD CALC: 23.1 % (ref 37–47)
HCT VFR BLD CALC: 23.9 % (ref 37–47)
HCT VFR BLD CALC: 24.5 % (ref 37–47)
HCT VFR BLD CALC: 24.7 % (ref 37–47)
HEMOGLOBIN: 7 GM/DL (ref 12–16)
HEMOGLOBIN: 7.4 GM/DL (ref 12–16)
HEMOGLOBIN: 7.5 GM/DL (ref 12–16)
HEMOGLOBIN: 7.6 GM/DL (ref 12–16)
HEPARIN UNFRACTIONATED: < 0.04 U/ML (ref 0.3–0.7)
HYPOCHROMIA: PRESENT
IMMATURE GRANS (ABS): 1.1 THOU/MM3 (ref 0–0.07)
IMMATURE GRANULOCYTES: 2.9 %
LYMPHOCYTES # BLD: 1.6 %
LYMPHOCYTES ABSOLUTE: 0.6 THOU/MM3 (ref 1–4.8)
MAGNESIUM: 1.7 MG/DL (ref 1.6–2.4)
MAGNESIUM: 1.8 MG/DL (ref 1.6–2.4)
MAGNESIUM: 1.9 MG/DL (ref 1.6–2.4)
MCH RBC QN AUTO: 26 PG (ref 26–33)
MCH RBC QN AUTO: 26.4 PG (ref 26–33)
MCH RBC QN AUTO: 26.7 PG (ref 26–33)
MCHC RBC AUTO-ENTMCNC: 30.3 GM/DL (ref 32.2–35.5)
MCHC RBC AUTO-ENTMCNC: 31 GM/DL (ref 32.2–35.5)
MCHC RBC AUTO-ENTMCNC: 31.4 GM/DL (ref 32.2–35.5)
MCV RBC AUTO: 85.1 FL (ref 81–99)
MCV RBC AUTO: 85.1 FL (ref 81–99)
MCV RBC AUTO: 85.9 FL (ref 81–99)
MISC REFERENCE: NORMAL
MODE: ABNORMAL
MONOCYTES # BLD: 3 %
MONOCYTES ABSOLUTE: 1.1 THOU/MM3 (ref 0.4–1.3)
NUCLEATED RED BLOOD CELLS: 0 /100 WBC
O2 SAT, MIXED: 56 %
PATHOLOGIST REVIEW: ABNORMAL
PCO2, MIXED VENOUS: 48 MMHG (ref 41–51)
PH, MIXED: 7.24 (ref 7.31–7.41)
PHOSPHORUS: 4 MG/DL (ref 2.4–4.7)
PHOSPHORUS: 4.9 MG/DL (ref 2.4–4.7)
PHOSPHORUS: 5 MG/DL (ref 2.4–4.7)
PLATELET # BLD: 123 THOU/MM3 (ref 130–400)
PLATELET # BLD: 130 THOU/MM3 (ref 130–400)
PLATELET # BLD: 133 THOU/MM3 (ref 130–400)
PLATELET ESTIMATE: ADEQUATE
PMV BLD AUTO: 10.7 FL (ref 9.4–12.4)
PMV BLD AUTO: 10.9 FL (ref 9.4–12.4)
PMV BLD AUTO: 9.6 FL (ref 9.4–12.4)
PO2 MIXED: 35 MMHG (ref 25–40)
POIKILOCYTES: ABNORMAL
POTASSIUM REFLEX MAGNESIUM: 4.1 MEQ/L (ref 3.5–5.2)
POTASSIUM REFLEX MAGNESIUM: 4.5 MEQ/L (ref 3.5–5.2)
POTASSIUM SERPL-SCNC: 4.3 MEQ/L (ref 3.5–5.2)
POTASSIUM SERPL-SCNC: 4.5 MEQ/L (ref 3.5–5.2)
RBC # BLD: 2.69 MILL/MM3 (ref 4.2–5.4)
RBC # BLD: 2.81 MILL/MM3 (ref 4.2–5.4)
RBC # BLD: 2.88 MILL/MM3 (ref 4.2–5.4)
SCAN OF BLOOD SMEAR: NORMAL
SEG NEUTROPHILS: 92.2 %
SEGMENTED NEUTROPHILS ABSOLUTE COUNT: 35.2 THOU/MM3 (ref 1.8–7.7)
SET PEEP: 8 MMHG
SET PRESS SUPP: 18 CMH2O
SET RESPIRATORY RATE: 16 BPM
SODIUM BLD-SCNC: 126 MEQ/L (ref 135–145)
SODIUM BLD-SCNC: 128 MEQ/L (ref 135–145)
SPHEROCYTES: ABNORMAL
TARGET CELLS: ABNORMAL
TOTAL PROTEIN: 4.7 G/DL (ref 6.1–8)
TOTAL PROTEIN: 5 G/DL (ref 6.1–8)
TOTAL PROTEIN: 5.5 G/DL (ref 6.1–8)
TOXIC GRANULATION: PRESENT
WBC # BLD: 30 THOU/MM3 (ref 4.8–10.8)
WBC # BLD: 30.6 THOU/MM3 (ref 4.8–10.8)
WBC # BLD: 38.2 THOU/MM3 (ref 4.8–10.8)

## 2021-11-05 PROCEDURE — 2580000003 HC RX 258: Performed by: NURSE PRACTITIONER

## 2021-11-05 PROCEDURE — 82803 BLOOD GASES ANY COMBINATION: CPT

## 2021-11-05 PROCEDURE — 86235 NUCLEAR ANTIGEN ANTIBODY: CPT

## 2021-11-05 PROCEDURE — 83735 ASSAY OF MAGNESIUM: CPT

## 2021-11-05 PROCEDURE — 85014 HEMATOCRIT: CPT

## 2021-11-05 PROCEDURE — 84100 ASSAY OF PHOSPHORUS: CPT

## 2021-11-05 PROCEDURE — 99232 SBSQ HOSP IP/OBS MODERATE 35: CPT | Performed by: UROLOGY

## 2021-11-05 PROCEDURE — 83516 IMMUNOASSAY NONANTIBODY: CPT

## 2021-11-05 PROCEDURE — 05H633Z INSERTION OF INFUSION DEVICE INTO LEFT SUBCLAVIAN VEIN, PERCUTANEOUS APPROACH: ICD-10-PCS | Performed by: INTERNAL MEDICINE

## 2021-11-05 PROCEDURE — 71045 X-RAY EXAM CHEST 1 VIEW: CPT

## 2021-11-05 PROCEDURE — 2580000003 HC RX 258: Performed by: INTERNAL MEDICINE

## 2021-11-05 PROCEDURE — 87075 CULTR BACTERIA EXCEPT BLOOD: CPT

## 2021-11-05 PROCEDURE — 2500000003 HC RX 250 WO HCPCS: Performed by: NURSE PRACTITIONER

## 2021-11-05 PROCEDURE — 36415 COLL VENOUS BLD VENIPUNCTURE: CPT

## 2021-11-05 PROCEDURE — 85027 COMPLETE CBC AUTOMATED: CPT

## 2021-11-05 PROCEDURE — 2580000003 HC RX 258: Performed by: STUDENT IN AN ORGANIZED HEALTH CARE EDUCATION/TRAINING PROGRAM

## 2021-11-05 PROCEDURE — 6360000002 HC RX W HCPCS: Performed by: STUDENT IN AN ORGANIZED HEALTH CARE EDUCATION/TRAINING PROGRAM

## 2021-11-05 PROCEDURE — 5A1D70Z PERFORMANCE OF URINARY FILTRATION, INTERMITTENT, LESS THAN 6 HOURS PER DAY: ICD-10-PCS | Performed by: INTERNAL MEDICINE

## 2021-11-05 PROCEDURE — 85520 HEPARIN ASSAY: CPT

## 2021-11-05 PROCEDURE — 85025 COMPLETE CBC W/AUTO DIFF WBC: CPT

## 2021-11-05 PROCEDURE — 87147 CULTURE TYPE IMMUNOLOGIC: CPT

## 2021-11-05 PROCEDURE — 86225 DNA ANTIBODY NATIVE: CPT

## 2021-11-05 PROCEDURE — 82330 ASSAY OF CALCIUM: CPT

## 2021-11-05 PROCEDURE — 99233 SBSQ HOSP IP/OBS HIGH 50: CPT | Performed by: INTERNAL MEDICINE

## 2021-11-05 PROCEDURE — 36000 PLACE NEEDLE IN VEIN: CPT | Performed by: INTERNAL MEDICINE

## 2021-11-05 PROCEDURE — 94003 VENT MGMT INPAT SUBQ DAY: CPT

## 2021-11-05 PROCEDURE — 94640 AIRWAY INHALATION TREATMENT: CPT

## 2021-11-05 PROCEDURE — 6370000000 HC RX 637 (ALT 250 FOR IP): Performed by: STUDENT IN AN ORGANIZED HEALTH CARE EDUCATION/TRAINING PROGRAM

## 2021-11-05 PROCEDURE — 99291 CRITICAL CARE FIRST HOUR: CPT | Performed by: INTERNAL MEDICINE

## 2021-11-05 PROCEDURE — 2500000003 HC RX 250 WO HCPCS: Performed by: STUDENT IN AN ORGANIZED HEALTH CARE EDUCATION/TRAINING PROGRAM

## 2021-11-05 PROCEDURE — 6360000002 HC RX W HCPCS: Performed by: NURSE PRACTITIONER

## 2021-11-05 PROCEDURE — 87077 CULTURE AEROBIC IDENTIFY: CPT

## 2021-11-05 PROCEDURE — 87070 CULTURE OTHR SPECIMN AEROBIC: CPT

## 2021-11-05 PROCEDURE — 85018 HEMOGLOBIN: CPT

## 2021-11-05 PROCEDURE — 6360000002 HC RX W HCPCS: Performed by: FAMILY MEDICINE

## 2021-11-05 PROCEDURE — 51700 IRRIGATION OF BLADDER: CPT

## 2021-11-05 PROCEDURE — 87186 SC STD MICRODIL/AGAR DIL: CPT

## 2021-11-05 PROCEDURE — 6370000000 HC RX 637 (ALT 250 FOR IP): Performed by: FAMILY MEDICINE

## 2021-11-05 PROCEDURE — 2580000003 HC RX 258: Performed by: EMERGENCY MEDICINE

## 2021-11-05 PROCEDURE — C9113 INJ PANTOPRAZOLE SODIUM, VIA: HCPCS | Performed by: NURSE PRACTITIONER

## 2021-11-05 PROCEDURE — 87205 SMEAR GRAM STAIN: CPT

## 2021-11-05 PROCEDURE — 2000000000 HC ICU R&B

## 2021-11-05 PROCEDURE — 6360000002 HC RX W HCPCS: Performed by: EMERGENCY MEDICINE

## 2021-11-05 PROCEDURE — 36592 COLLECT BLOOD FROM PICC: CPT

## 2021-11-05 PROCEDURE — 80053 COMPREHEN METABOLIC PANEL: CPT

## 2021-11-05 PROCEDURE — 90945 DIALYSIS ONE EVALUATION: CPT

## 2021-11-05 RX ORDER — POTASSIUM CHLORIDE 29.8 MG/ML
20 INJECTION INTRAVENOUS PRN
Status: DISCONTINUED | OUTPATIENT
Start: 2021-11-05 | End: 2021-11-17

## 2021-11-05 RX ORDER — MAGNESIUM SULFATE 1 G/100ML
1000 INJECTION INTRAVENOUS PRN
Status: DISCONTINUED | OUTPATIENT
Start: 2021-11-05 | End: 2021-11-17

## 2021-11-05 RX ORDER — BUMETANIDE 0.25 MG/ML
2 INJECTION, SOLUTION INTRAMUSCULAR; INTRAVENOUS ONCE
Status: COMPLETED | OUTPATIENT
Start: 2021-11-05 | End: 2021-11-05

## 2021-11-05 RX ORDER — SODIUM CHLORIDE 9 MG/ML
INJECTION, SOLUTION INTRAVENOUS PRN
Status: DISCONTINUED | OUTPATIENT
Start: 2021-11-05 | End: 2021-11-08

## 2021-11-05 RX ADMIN — Medication: at 17:04

## 2021-11-05 RX ADMIN — AMIODARONE HYDROCHLORIDE 0.5 MG/MIN: 1.8 INJECTION, SOLUTION INTRAVENOUS at 09:56

## 2021-11-05 RX ADMIN — SODIUM CHLORIDE, PRESERVATIVE FREE 10 ML: 5 INJECTION INTRAVENOUS at 09:03

## 2021-11-05 RX ADMIN — PHENYLEPHRINE HYDROCHLORIDE 240 MCG/MIN: 10 INJECTION INTRAVENOUS at 09:43

## 2021-11-05 RX ADMIN — FENTANYL CITRATE 25 MCG/HR: 50 INJECTION INTRAMUSCULAR; INTRAVENOUS at 04:16

## 2021-11-05 RX ADMIN — ALBUTEROL SULFATE 2.5 MG: 2.5 SOLUTION RESPIRATORY (INHALATION) at 18:12

## 2021-11-05 RX ADMIN — VASOPRESSIN 0.04 UNITS/MIN: 20 INJECTION INTRAVENOUS at 03:48

## 2021-11-05 RX ADMIN — CEFEPIME HYDROCHLORIDE 2000 MG: 2 INJECTION, POWDER, FOR SOLUTION INTRAVENOUS at 23:05

## 2021-11-05 RX ADMIN — VASOPRESSIN 0.04 UNITS/MIN: 20 INJECTION INTRAVENOUS at 13:41

## 2021-11-05 RX ADMIN — SENNOSIDES 8.6 MG: 8.6 TABLET, COATED ORAL at 09:22

## 2021-11-05 RX ADMIN — Medication: at 17:05

## 2021-11-05 RX ADMIN — POLYETHYLENE GLYCOL 3350 17 G: 17 POWDER, FOR SOLUTION ORAL at 09:03

## 2021-11-05 RX ADMIN — ASPIRIN 81 MG CHEWABLE TABLET 81 MG: 81 TABLET CHEWABLE at 09:03

## 2021-11-05 RX ADMIN — ALBUTEROL SULFATE 2.5 MG: 2.5 SOLUTION RESPIRATORY (INHALATION) at 12:14

## 2021-11-05 RX ADMIN — Medication: at 17:03

## 2021-11-05 RX ADMIN — CEFEPIME HYDROCHLORIDE 2000 MG: 2 INJECTION, POWDER, FOR SOLUTION INTRAVENOUS at 11:49

## 2021-11-05 RX ADMIN — VASOPRESSIN 0.04 UNITS/MIN: 20 INJECTION INTRAVENOUS at 23:07

## 2021-11-05 RX ADMIN — Medication: at 22:36

## 2021-11-05 RX ADMIN — SODIUM HYPOCHLORITE: 1.25 SOLUTION TOPICAL at 09:04

## 2021-11-05 RX ADMIN — SODIUM CHLORIDE, PRESERVATIVE FREE 10 ML: 5 INJECTION INTRAVENOUS at 21:05

## 2021-11-05 RX ADMIN — ALLOPURINOL 300 MG: 300 TABLET ORAL at 09:03

## 2021-11-05 RX ADMIN — ALBUTEROL SULFATE 2.5 MG: 2.5 SOLUTION RESPIRATORY (INHALATION) at 06:34

## 2021-11-05 RX ADMIN — TIOTROPIUM BROMIDE AND OLODATEROL 2 PUFF: 3.124; 2.736 SPRAY, METERED RESPIRATORY (INHALATION) at 08:31

## 2021-11-05 RX ADMIN — PANTOPRAZOLE SODIUM 40 MG: 40 INJECTION, POWDER, FOR SOLUTION INTRAVENOUS at 09:03

## 2021-11-05 RX ADMIN — AMIODARONE HYDROCHLORIDE 0.5 MG/MIN: 1.8 INJECTION, SOLUTION INTRAVENOUS at 21:05

## 2021-11-05 ASSESSMENT — PULMONARY FUNCTION TESTS
PIF_VALUE: 27
PIF_VALUE: 21
PIF_VALUE: 26
PIF_VALUE: 28
PIF_VALUE: 25
PIF_VALUE: 28

## 2021-11-05 ASSESSMENT — PAIN SCALES - GENERAL: PAINLEVEL_OUTOF10: 0

## 2021-11-05 NOTE — PROGRESS NOTES
[facility]         Renal Inpatient Follow-Up note         11/5/2021 7:47 AM    Pt Name:   Garth Holland  YOB: 1950  Attending:   Galindo Mancini MD    Chief Complaint : Garth Holland is a 73 y.o. female being followed by nephrology for ALVIN/CKD    Interval History :   Patient seen and examined by me. No distress  Intubated cannot assess history or review of systems  Still having almost myesha blood from the the nephrostomy tube, site looks slightly okay 100 versus  She is significantly positive.  Documentation appears wrong yesterday    Continues to be on pressors, on 2 pressors right now     Scheduled Medications :    bumetanide  2 mg IntraVENous Once    tiotropium-olodaterol  2 puff Inhalation Daily    aspirin  81 mg Oral Daily    vancomycin (VANCOCIN) intermittent dosing (placeholder)   Other RX Placeholder    pantoprazole  40 mg IntraVENous QAM    sodium hypochlorite   Irrigation Daily    Riociguat  2.5 mg Oral Q8H    sodium chloride flush  5-40 mL IntraVENous 2 times per day    allopurinol  300 mg Oral Daily    [Held by provider] FLUoxetine  40 mg Oral Daily    [Held by provider] metoprolol tartrate  12.5 mg Oral BID    albuterol  2.5 mg Nebulization Q6H      bumetanide 0.1 mg/mL infusion      sodium chloride      vasopressin (Septic Shock) infusion 0.04 Units/min (11/05/21 0348)    phenylephrine (KRISTAL-SYNEPHRINE) 250 mg/250 mL infusion 250 mcg/min (11/05/21 0612)    sodium chloride      fentaNYL (SUBLIMAZE) 1250 mcg in sodium chloride 0.9 % 250 mL 25 mcg/hr (11/05/21 0416)    sodium chloride 25 mL (10/30/21 0129)    amiodarone 0.5 mg/min (11/04/21 2014)    [Held by provider] heparin (PORCINE) Infusion Stopped (11/04/21 0134)    [Held by provider] sodium chloride 50 mL/hr at 10/28/21 2314    midazolam Stopped (11/04/21 0830)       Vitals :  BP (!) 120/48   Pulse 77   Temp 98.7 °F (37.1 °C) (Oral)   Resp 14   Ht 4' 9\" (1.448 m)   Wt 202 lb 9.6 oz (91.9 kg)   SpO2 93%   BMI 43.84 kg/m²     24HR INTAKE/OUTPUT:      Intake/Output Summary (Last 24 hours) at 11/5/2021 0747  Last data filed at 11/5/2021 0645  Gross per 24 hour   Intake 2544 ml   Output 93103 ml   Net -01263 ml     Last 3 weights  Wt Readings from Last 3 Encounters:   11/04/21 202 lb 9.6 oz (91.9 kg)   10/25/21 164 lb (74.4 kg)   09/29/21 160 lb (72.6 kg)           Physical Exam :  General Appearance:  Well developed. No distress  Mouth/Throat:  Oral mucosa moist.  ET tube in place  Neck:  Supple, no JVD  Lungs:  Breath sounds: clear, diminished  Heart[de-identified]  S1,S2 heard  Abdomen:  Soft, non - tender  Musculoskeletal:  Edema - noted more prominent in the hips         Last 3 CBC   Recent Labs     11/04/21  0620 11/04/21  0620 11/04/21  1106 11/04/21  1106 11/04/21  1758 11/04/21  2345 11/05/21  0513   WBC 51.0*  --  46.2*  --   --   --  38.2*   RBC 3.03*  --  2.94*  --   --   --  2.88*   HGB 8.0*   < > 7.8*   < > 7.6* 7.5* 7.6*   HCT 26.2*   < > 25.2*   < > 24.7* 24.4* 24.5*     --  142  --   --   --  130    < > = values in this interval not displayed. Last 3 CMP  Recent Labs     11/03/21  0145 11/04/21  0515 11/05/21  0513   * 130* 126*   K 4.3 4.4 4.3   CL 98 97* 93*   CO2 20* 20* 18*   BUN 54* 54* 57*   CREATININE 2.2* 2.4* 2.8*   CALCIUM 8.9 8.5 9.0   LABALBU 2.6* 2.6* 2.6*   BILITOT 0.2* 0.2* 0.3             ASSESSMENT / Plan   1. Renal -acute kidney injury, multifactorial etiology which includes hypotension from sepsis, IV contrast exposure on 10/29 and now has some hydronephrosis as well . ? At this time all the nephrotoxic agents are on hold on Levophed and blood pressure is well maintained. ? Making some urine output. Status post nephrostomy tube placement 11/1/2021  ? Bloodstained urine. Creatinine continues to worsenshe is  > +16 L  ? D/W resident and will start on bumex drip and see how she does   ?  If not much improvement in the urine output she will need renal replacement therapy     2. Electrolytes -mild hyponatremia, due to multiple hypotonic drips and worsening renal function  3. Mild acidosis follow for now  4. Acute hypercapnic respiratory failure  5. Pneumonia with pleural effusion and septic shock  6. Pulmonary hypertension  7. Obstructive sleep apnea  8. Sepsis  9. Anemia status post blood transfusion  10. Fluid overload plan as mentioned above  11. Meds reviewed and discussed with  nursing staff and ICU team    Dr. Lea Esteves MD, M,D.  [facility].

## 2021-11-05 NOTE — CARE COORDINATION
11/5/21, 3:25 PM EDT    DISCHARGE ON GOING EVALUATION    Natanael Hercules day: 9  Location: -09/009-A Reason for admit: Acute respiratory failure ([address] [J96.00]  Flash pulmonary edema ([address] [J81.0]  Acute respiratory failure with hypercapnia ([address] [J96.02]   Procedure:   10/27 CXR: Near complete opacification of the left hemithorax with very little aerated lung visualized in the left upper lobe. Small right pleural effusion and right lower lobe consolidation obscures visualization of the right hemidiaphragm pulmonary vascular congestion is observed  10/27 Intubated  10/27 CVC right subclavian  10/27 IR: Left thoracentesis with 1.1L removed  10/28 EEG: no definitive evidence of epileptiform activity appreciated. 10/29 Cardiac Cath with [facility] and michelle placed - removed 11/4  10/31 CT Abd/pelvis:   1. Bilateral pleural effusions and abnormal densities in the right and left lower lobes consistent with inflammatory process. 2. Cardiomegaly. Small pericardial effusion. 3. Small amount of fluid surrounding the liver. 4. Gallstone. Increased attenuation in the gallbladder. No pericholecystic fluid or biliary dilatation. 5. Large staghorn calculus in the left kidney. Severe left-sided hydronephrosis and hydroureter. Increased density in the left perinephric fat consistent with inflammatory process. 6. Atherosclerotic calcification in the abdominal aorta, iliac and femoral arteries. 7. Padilla catheter within the bladder. 8. Lumbar spondylosis. 9. Increased density in the skin and subcutaneous soft tissues suggestive of edema or anasarca. 10. Enteric tube. [place]-Kasia catheter in place, seen better on plain radiographs     11/1 CT Chest: Moderate bilateral pleural effusions have increased in size in the interval with associated adjacent atelectasis; Persistent pulmonary vascular congestion/edema with cardiomegaly;  Decreased pericardial effusion  11/1 Left Nephrostomy tube placed in IR  11/3 Cardioverted x1 to NSR  11/4 CT Abd/pelvis:   1. Interval percutaneous left nephrostomy tube placement with small amount of blood in the renal pelvis about the nephrostomy tube. 2. Hypodense lesion in the anterior aspect of the interpolar region of the left kidney with small focus of air at the margin. Developing abscess can't be excluded. 3. Scattered foci of air elsewhere in the kidney likely sequelae of nephrostomy tube placement. 4. Mildly worsening anasarca with persistent small to moderate ascites and moderate pleural effusions which may be cardiogenic given cardiomegaly. 5. Worsening opacities adjacent to the pleural effusions as evidence for worsening atelectasis or infiltrates. 6. Small hemopericardium, stable compared to prior exam.   7. Stable retroperitoneal periaortic lymphadenopathy at the level of the left kidney     11/5 CXR:   1. Lines and tubes appropriate as above. 2. Increasing airspace consolidation and persistent cardiomegaly with    pleural effusion on the left. Barriers to Discharge: Afib RVR and cardioverted x1 to NSR on 11/3. CBI started on 11/4 and received 2 PRBC. Failed SBT. SGC and michelle removed yesterday. Per Nephrology, if UO does not improve, will need RRT. CBI continues. Remains on vent w/ETT on PCV, peep 8, FIO2 50%, sats 91%. Afebrile. NSR. Unable to follow commands; PAGAN x4 to painful stim. Hand irrigate nephrostomy tube Q4H PRN for hematuria, clots, retention; flush with 10 ml sterile solution Q2H until clear. Intensivist, Cardiology, GI, Nephrology, and Urology following. Respiratory culture +MRSA and adenovirus by PCR. Telemetry, CVC, OG w/TF, left nephrostomy tube, seaman, SCDs. Amio @ 0.5 mg/min, bumex @ 0.5 mg/hr, fentanyl @ 25 mcg/hr, addie @ 250 mcg/min, vasopressin @ 0.04 units/min, nebs, allopurinol, asa, IV cefepime, IV protonix, riociguat, dakins daily, IV vancomycin. Na+ 126, BUN 60, Creat 2.7, alb 2.6, wbc 38.2, hgb 7.4. Respiratory culture sent. PCP: Maxwell Lake MD  Readmission Risk Score: 21.2%  Patient Goals/Plan/Treatment Preferences: From home alone and current with [facility]. DOT on case. Monitor for possible needs. Will need PT/OT when appropriate.

## 2021-11-05 NOTE — PROGRESS NOTES
Comprehensive Nutrition Assessment    Type and Reason for Visit:  Reassess (TF check)    Nutrition Recommendations/Plan:   Continue Nepro TF at 30 ml/hr. Free H20 flush per Dr.   Per RN 1 bm recorded this admit on 10/26 ( day 9 admit). Per RN pt may need CRRT. Continue weight checks. Varied weight this admit. Recommend probiotic. Nutrition Assessment:     Pt. nutritionally compromised AEB NPO status, is tolerating TF at this time, but TF holds & intolerances this admit to include TF held 11/2 with residuals over 450 ml and constipation . At risk for further nutrition compromise r/t admitted with acute respiratory failure, intubated 10/27, AFib, ALVIN, stage 3 sacral wound,10/31 for  nephrosotmy tube placement, anemia , concern for cholecystitis. and underlying medical condition (CKD, s/p ID of buttock wound 8/6/21,CHF, gout, pulmonary HTN, obesity ).  Nutrition recommendations/interventions as per above. Malnutrition Assessment:  Malnutrition Status: At risk for malnutrition (Comment)    Context:  Acute Illness     Findings of the 6 clinical characteristics of malnutrition:  Energy Intake:  1 - 75% or less of estimated energy requirements for 7 or more days  Weight Loss:   (5.8% weight loss in 3.5 months)     Body Fat Loss:  No significant body fat loss     Muscle Mass Loss:  Unable to assess    Fluid Accumulation:  1 - Mild     Strength:  Not Performed    Estimated Daily Nutrient Needs:  Energy (kcal):  ~1127 kcals (15); Weight Used for Energy Requirements:  Admission (75.1 kg)     Protein (g):  ~59 grams ( 1.4) monitor reanl status; Weight Used for Protein Requirements:  Ideal (42.3 kg)        Fluid (ml/day):  per Dr;         Nutrition Related Findings:    Pt  intubated 10/27, 10/31 nephrostomy tube placed, TF infusing at goal of 30 ml/hr currently. But pt with intolerance & hold of TF this admit. Per RN 1 bm recorded this admit on 10/26 ( day 9 admit). Per RN pt may need CRRT.  Pt is puffy & on bumex. MAP 66. Na+ 126, BUN 57, Cr 2.8, glucose 135, WBC 38.2, Hgb 7.6. Pt needed blood yesterday. meds include fentanyl, addie, vasopressin, prn senekot, prn glycolax , protonix, vancomycin. Wounds:  Stage III (on coccyx per RN)       Current Nutrition Therapies:    Current Tube Feeding (TF) Orders:  · Feeding Route: Orogastric  · Formula: Renal Formula (Nepro)  · Schedule: Continuous (goal 30 ml/hr)  · Additives/Modulars:  (.)  · Water Flushes: per Dr  · Current TF & Flush Orders Provides: at goal of 30 ml/hr  · Goal TF & Flush Orders Provides: 7126 kcals, 58 grams protein, 115 grams CHO, 18 grams fiber, 523 ml free H20 in 720 ml TF/24 hours      Anthropometric Measures:  · Height: 4' 9\" (144.8 cm)  · Current Body Weight: 202 lb 9.6 oz (91.9 kg) (11/4 +2 edema)   · Admission Body Weight: 165 lb 9.1 oz (75.1 kg) (10/27 +1 edema)    · Usual Body Weight: 175 lb 11.3 oz (79.7 kg) (per EMR 7/16/21)     · BMI: 43.8  · Adjusted Body Weight:  ;  (IBW ~93 #)   · BMI Categories: Obese Class 2 (BMI 35.0 -39.9) (on admit)       Nutrition Diagnosis:   · Inadequate oral intake related to impaired respiratory function as evidenced by intubation      Nutrition Interventions:   Food and/or Nutrient Delivery:   Continue TF  Nutrition Education/Counseling:  Education not appropriate   Coordination of Nutrition Care:  Continue to monitor while inpatient, Interdisciplinary Rounds    Goals:  TF to provide % of nutrient needs while pt is intubated. Nutrition Monitoring and Evaluation:   Behavioral-Environmental Outcomes:  None Identified   Food/Nutrient Intake Outcomes:  Enteral Nutrition Intake/Tolerance  Physical Signs/Symptoms Outcomes:  Biochemical Data, GI Status, Nausea or Vomiting, Fluid Status or Edema, Hemodynamic Status, Nutrition Focused Physical Findings, Skin, Weight     Discharge Planning:     Too soon to determine     Electronically signed by Guido Thomas RD, LD on 11/5/21 at 2:30 PM EDT    Contact: [phone]

## 2021-11-05 NOTE — PROGRESS NOTES
[address]  [address] [facility] [address]  Dept: [phone]  Loc: [phone]  Visit Date: 10/27/2021  Urology Progress Note    Chief Complaint: sob  Reason for Urology  Consult: kidney stone    Subjective:   Patient is resting in bed, intubated. CBI running at slow rate, urine clear in tubing, pink tinged in bag. Left nephrostomy tube with bloody drainage in bag. Heparin stopped. HH dropped, HGB 6.6, and pt was transfused yesterday. Post transfusion HGB today 7.4       Urine cx from  and 10/27 are negative. Failed SBT yesterday. Plan to send Ux from neph today  Reviewed CT results with Dr Davidson Yee. Watch HH closely. No intervention needed at this time. Will continue to follow.     Vitals:  BP (!) 114/48   Pulse 82   Temp 98.9 °F (37.2 °C) (Oral)   Resp 19   Ht 4' 9\" (1.448 m)   Wt 202 lb 9.6 oz (91.9 kg)   SpO2 93%   BMI 43.84 kg/m²   Temp  Av.5 °F (36.9 °C)  Min: 97.7 °F (36.5 °C)  Max: 98.9 °F (37.2 °C)    Intake/Output Summary (Last 24 hours) at 2021 1223  Last data filed at 2021 1122  Gross per 24 hour   Intake 2684 ml   Output 13575 ml   Net -9841 ml       Social History     Socioeconomic History    Marital status: Single     Spouse name: Not on file    Number of children: 0    Years of education: Not on file    Highest education level: Not on file   Occupational History    Not on file   Tobacco Use    Smoking status: Never Smoker    Smokeless tobacco: Never Used   Vaping Use    Vaping Use: Never used   Substance and Sexual Activity    Alcohol use: No    Drug use: No    Sexual activity: Not Currently   Other Topics Concern    Not on file   Social History Narrative    Not on file     Social Determinants of Health     Financial Resource Strain: Low Risk     Difficulty of Paying Living Expenses: Not very hard   Food Insecurity: No Food Insecurity    Worried About Running Out of Food in the Last Year: Never true    Traci of Food in the Last Year: Never true   Transportation Needs:     Lack of Transportation (Medical):  Lack of Transportation (Non-Medical):    Physical Activity:     Days of Exercise per Week:     Minutes of Exercise per Session:    Stress:     Feeling of Stress :    Social Connections:     Frequency of Communication with Friends and Family:     Frequency of Social Gatherings with Friends and Family:     Attends Jehovah's witness Services:     Active Member of Clubs or Organizations:     Attends Club or Organization Meetings:     Marital Status:    Intimate Partner Violence:     Fear of Current or Ex-Partner:     Emotionally Abused:     Physically Abused:     Sexually Abused:      Family History   Problem Relation Age of Onset    Diabetes Father     Arthritis Mother     COPD Mother     Diabetes Sister     Heart Disease Maternal Uncle     Breast Cancer Niece 36    Sleep Apnea Brother     Asthma Neg Hx     Birth Defects Neg Hx     Cancer Neg Hx     Depression Neg Hx     Early Death Neg Hx     Hearing Loss Neg Hx     High Blood Pressure Neg Hx     High Cholesterol Neg Hx     Kidney Disease Neg Hx     Learning Disabilities Neg Hx     Mental Illness Neg Hx     Mental Retardation Neg Hx     Miscarriages / Stillbirths Neg Hx     Stroke Neg Hx     Substance Abuse Neg Hx     Vision Loss Neg Hx     Other Neg Hx      No Known Allergies    Objective:    Constitutional: intubated, opens eyes to name   HEENT:   Head:         Normocephalic and atraumatic. Mucous membranes are normal.   Eyes:         EOM are normal. No scleral icterus. Nose:    The external appearance of the nose is normal  Ears: The ears appear normal to external inspection. Cardiovascular:       Normal rate, regular rhythm. Abdominal:          Soft. No tenderness. Active bowel sounds. Genitalia:    Padilla catheter draining pink tinged urine, no clots. CBI at moderate rate.   Extremities:    + generalized edema    Labs:  WBC:    Lab Results   Component Value Date    WBC 38.2 11/05/2021     Hemoglobin/Hematocrit:    Lab Results   Component Value Date    HGB 7.4 11/05/2021    HCT 24.7 11/05/2021     BMP:    Lab Results   Component Value Date     11/05/2021    K 4.5 11/05/2021    K 5.3 10/27/2021    CL 92 11/05/2021    CO2 19 11/05/2021    BUN 60 11/05/2021    LABALBU 2.6 11/05/2021    CREATININE 2.7 11/05/2021    CALCIUM 8.8 11/05/2021    LABGLOM 17 11/05/2021     Ct shows:    1. Interval percutaneous left nephrostomy tube placement with small amount of blood in the renal pelvis about the nephrostomy tube. 2. Hypodense lesion in the anterior aspect of the interpolar region of the left kidney with small focus of air at the margin. Developing abscess can't be excluded. Impression/Plan:    Left staghorn stone- s/p left nephrostomy tube placement 11/1/2021 in IR. Will need treated at a later time. Respiratory failure secondary to pneumonia- intubated  Afib- cardioversion 11/3  Gross hematuria- clear in tube, pink in bag Heparin drip stopped. ALVIN- worsening, nephrology on board. Acute blood loss anemia- transfused with 2 units of PRBC yesterday. Monitor HH    Reviewed CT results with Dr Karie Julien. Watch HH closely. No intervention needed at this time. Will continue to follow.       LANE Larios - CNP, LANE  11/05/21 12:23 PM  Urology    Electronically signed by LANE Larios CNP on 11/5/2021 at 12:23 PM

## 2021-11-05 NOTE — PROCEDURES
Central Line Placement: Risks and benefits to the procedure were discussed. Alternatives and their risks were discussed as well. Patient was placed in the attention position. Patient was placed in Trendelenburg position. Patient was prepped using Chlorhexidene prep. Patient was draped utilizing sterile gloves, hair net, mask, sterile gown and sterile OR towels. Maximum barrier precautions were utilized. Utilizing the left subclavian approach, patient received 5 cc of 1% lidocaine. Utilizing an introducer needle, it was placed subcutaneously advanced under the clavicle and leveled flat. It was subsequently advanced toward the thoracic inlet, until venous return was obtained. A guide wire was placed through the introducer needle. The introducer needle was subsequently withdrawn leaving the guide wire in place. Utilizing a scalpel, a small incision was made in the skin. A punch dilator was placed over the guide wire and  withdrawn leaving the guide wire in place and was sequentially dilated until a Tesio dialysis catheter was able to be placed over the guide wire. The guide wire was subsequently withdrawn leaving this catheter in place. All ports had good venous return and were subsequently flushed with saline. The catheter was secured using 2.0 silk. Secondary cleansing with chlorhexidine prep was subsequently placed. Tegaderm with bio- patch dressing was utilized for secondary securing and protection. There were no complications. EBL:   Less than 5 mL      Indication: Acute Renal Failure    I was supervised by Wong Moncada MD who was present during the procedure    Electronically signed by Luis Tyson DO   Electronically signed by Yoselyn Moncada MD.

## 2021-11-05 NOTE — PROGRESS NOTES
Urine noted to be dark red in color with decreased drainage. Padilla catheter tubing milked and bladder irrigation increased. Multiple small clots noted with pink urine following. Will continue to monitor closely.

## 2021-11-05 NOTE — PROGRESS NOTES
CRITICAL CARE PROGRESS NOTE      Patient:  Miguel Ángel Chau    Unit/Bed:4D-09/009-A  YOB: 1950  MRN: [id number]   PCP: Dalia Esquivel MD  Date of Admission: 10/27/2021  Chief Complaint:- Shortness of breath    Assessment and Plan:    1. Acute combined hypercapnic and hypoxic respiratory failure: Secondary to severe pneumonia with left-sided pleural effusion. Currently on pressure control ventilation with Pressure control 14 PEEP 6 FiO2 30%. Trial of SBP today. Will wean sedation and attempt extubation if patient continues to improve  2. Severe bilateral multiorganism pneumonia: PCR positive Staph w/ MECA gene and adenovirus. Culture growing MSSA. Day 9/14 Vancomycin (started 10/28/21) and completed 5 days Rocephin. 3. Septic Shock: 2/2 severe PNA. CI 6.3 NE d/c 2/2 worsening afib RVR. NE d/c on 11/3/21 2/2 worsening afib/RVR. Dubois removed 11/5/21. Currently on pressor support w/ phenylephrine. 4. Stage II ALVIN on CKD 3: Suspect post renal etiology 2/2 staghorn calculi. Nephrology following. No plans for HD at this time. Of note patient does have baseline CKD positive RYLAN screen suggests underlying autoimmune etiology. Mitochondrial Abs, Anti-DS-DNA, Anti histone, Anti Banks, Anti-Savana, ANCA pending. GBM antibodies negative, C3/C4 levels normal, elevated kappa/lambda free light chains with normal ratio. No plans per Bx per nephrology. Fluid collection noted on left renal unlikely abscess as patient remains afebrile. Holding nephrotoxic agents. Trial of Bumex today. Will proceed with dialysis renal function fails to recover. 5. Hydronephrosis 2/2 Left-sided staghorn calculi: Urology following. Percutaneous drainage tube 11/2/21 IR. Low output following tube placement. Increased flow on CBI per urology recs. Still flushing clots  6. Hematuria: s/p perc tube placement. CBI per above. Holding Heparin  7. Hyponatremia: Suspect 2/2 renal failure. Unable to obtain urine Na 2/2 bladder irrigation.  Trial of Bumex today to promote diuresis. 8. Bilateral Pleural Effusion:  2/2 PNA per above with associated volume overload in context of renal failure. Interval enlargement noted on CT 11/1/21. Improvemed in concomitant pericardial effusion  9. New onset atrial fibrillation with rapid ventricular response: NSR s/p DCCV 11/3/21 Holding Heparin per hematuria. Will continue Amiodarone. Bridge to Eliquis once stable. Holding metoprolol 2/2 hypotension  10. Suspected COPD: current every day smoker. Obstructive process noted on flow volume loop on ventilator. Started empiric therapy with Stiolto. Recommend formal PFT once improved  11. PAH/chronic RV failure NYHA II: EF 55 to 60% G2 DD TTE 5/4/2021. RHC present. Remington Doug catheter shows improvement in PA pressures MPAP <32. Treated w/ Riociguat. 12. MERCEDES: hematuria s/p perc tube placement per above s/p 1 unit PRBC 10/31/21 1 unit PRBC 11/4/21. 13. Cholelithiasis: w/ dilated CBD. Noted on 10/30/2021 US liver/GB. GI following. Does not suspect choledocolithiasis at this time. May consider HIDA scan once stable   14. Stage III decubitus ulcer with inferior tunneling: S/p excision VAC placement 8/21. Low suspicion for active infection  15. Moderate malnutrition: Pre-albumin 14.4 w/ notable cachexia  16. Chronic tobacco use:   cessation  17. Mild AS: Noted on previous TTE not appreciated on repeat imaging  18. Gout:  W/o exacerbation. Continue allopurinol  19. BARBER: non compliant w/ CPAP  20. Hyperkalemia (resolved): 2/2 ALVIN      INITIAL H AND P AND ICU COURSE:  71F admitted [facility] 10/27/21 w/ SOB. Current smoker w/ PMH PAH grp 3, HFpEF, stage III sacral ulcer s/p wound ostomy 9/21. Patient sister reports SpO2 51% at home and was brought to ED where ABG showed pH 7.19, PCO2 80, PO2 134. She required emergent intubation and was transferred to ICU.  Workup revealed left sided pleural effusion and underwent US guided thoracentesis w/ 1100 cc removed consistent w/ MRSA/adenovirus. Patient was noted to be in afib/RVR and was placed on amio, heparin drip. Patient was also noted to have normocytic anemia and received 1 unit PRBC 10/31/21. CT abdomen revealed CBD dilation w/ cholelithiasis. 11/1/2021: Plan for nephrostomy tubes today. Hemoglobin 7.7 s/p 1 unit PRBC yesterday. Weaned to 4 mics Levophed. 11/2/2021: Patient resumed back on prior dose of vancomycin. Low urine output w/ increasing pressor requirements today    11/3/2021: Will attempt SBT if able to wean today. Increased Amio drip 2/2 worsening tachycardia. Continued hematuria w/ low urine output. Cr stable. Will evaluate UTI following perc tube placement. Rise in WBC noted. Culture pending    11/4/2021: Cardioversion on 11/3/2021. Now and NSR. CVP 29 this a.m. Suspect drop in CI 2/2 to stopping levophed. Diffuse edema following transfusion overnight. Given one-time dose of 2 mg Bumex and albumin. SBT this AM    11/5/2021: Failed SBT yesterday. Continued low urine output. Trial of Bumex today. Will proceed with dialysis if fails to improve. Past Medical History:  Per HPI. Family History:  DM in father and sister. Social History: chronic smoker.     ROS   Unable to obtain a comprehensive ROS as patient sedated and intubated     Scheduled Meds:   tiotropium-olodaterol  2 puff Inhalation Daily    aspirin  81 mg Oral Daily    vancomycin (VANCOCIN) intermittent dosing (placeholder)   Other RX Placeholder    pantoprazole  40 mg IntraVENous QAM    sodium hypochlorite   Irrigation Daily    Riociguat  2.5 mg Oral Q8H    sodium chloride flush  5-40 mL IntraVENous 2 times per day    allopurinol  300 mg Oral Daily    [Held by provider] FLUoxetine  40 mg Oral Daily    [Held by provider] metoprolol tartrate  12.5 mg Oral BID    albuterol  2.5 mg Nebulization Q6H     Continuous Infusions:   sodium chloride      vasopressin (Septic Shock) infusion 0.04 Units/min (11/05/21 0348)    phenylephrine (KRISTAL-SYNEPHRINE) 250 mg/250 mL infusion 220 mcg/min (11/05/21 0434)    sodium chloride      fentaNYL (SUBLIMAZE) 1250 mcg in sodium chloride 0.9 % 250 mL 25 mcg/hr (11/05/21 0416)    sodium chloride 25 mL (10/30/21 0129)    amiodarone 0.5 mg/min (11/04/21 2014)    [Held by provider] heparin (PORCINE) Infusion Stopped (11/04/21 0134)    [Held by provider] sodium chloride 50 mL/hr at 10/28/21 2314    midazolam Stopped (11/04/21 0830)       PHYSICAL EXAMINATION:  T:  98.7. P:  72 RR:  18. B/P:  123/52. FiO2:  50. O2 Sat:  96%. I/O: 8484/84039 (irrigation)  Body mass index is 43.84 kg/m². GCS:   8  PC: 18 PEEP: 8: TV: 350: RRTotal: 11: Ti:1 sec  General: Acute on chronically ill-appearing elderly white female  HEENT:  normocephalic and atraumatic. No scleral icterus. PERR  Neck: supple. No Thyromegaly. Lungs: Diffuse coarse rhonchi appreciated. No retractions  Cardiac: Irregular tachycardiac. No JVD. Abdomen: soft. Nontender. Extremities:  +2 pitting edema x4. noted No clubbing, cyanosis   Vasculature: capillary refill < 3 seconds. Palpable dorsalis pedis pulses. Skin:  warm and dry. Psych: Unable to assess as patient is currently sedated and on mechanical ventilation  Lymph:  No supraclavicular adenopathy. Neurologic:  No focal deficit. No seizures. Lines  CVC placed 10/27/2021  [place]-Kasia catheter placed 10/29/2021-11/4/21  Padilla placed 10/27/2021  Intubated 10/27/2021  Arterial line 10/29/2021    Data: (All radiographs, tracings, PFTs, and imaging are personally viewed and interpreted unless otherwise noted).     BMP pending sodium 126 potassium 4.3 chloride 93 bicarb 18 BUN/creatinine 57/2.8 anion gap 15 ionized calcium 1.3 magnesium 1.8 glucose 135 phosphorus 5.0 albumin 2.6 alk phos 119 ALT/AST 13/15 total bilirubin 0.3 glucose 135   CBC 38.2 H&H 7.6/24.5 platelets 447    Telemetry shows NSR   Chest x-ray 10/31/2021 demonstrated diffuse bilateral patchy opacities with left-sided pleural effusion and cardiomegaly   Hemodynamics 11/1/2021 CI 4.8, SVRI 689, CVP 25, PAP 53/29   Respiratory culture collected on 10/28/2021-MSSA culture 11/5/21 pending   Pneumonia panel collected 10/27/2021 demonstrates staph aureus. MEC-A gene and adenovirus   CT chest on 11/1/2021 demonstrates bilateral pleural effusions with interval enlargement   CXR 11/3/2021 demonstrates worsening pulmonary congestion with bilateral effusions   UA shows bacteria w/ moderate white cells   CT Abdomen and pelvis 11/5/21 showed perinephric stranding with left-sided hypodense nodule with air-fluid level concerning for abscess. Diffuse ascites and anasarca noted. Seen with multidisciplinary ICU team.  Meets Continued ICU Level Care Criteria:    [x] Yes   [] No - Transfer Planned to listed location:  [] HOSPITALIST CONTACTED-      Case and plan discussed with Dr. Haja Forrester. Electronically signed by Viet Jimenes DO PGY-2  CRITICAL CARE SPECIALIST  Patient seen by me. Case discussed with resident physician. Patient with obstructive lung disease. LAMA/LABA. Continue with sedation. Progressive renal failure. Possible dialysis. Continue antibiotics. Italicized font represents changes to the note made by me. CC time 35 minutes. Time was discontiguous. Time does not include procedures. Time does include my direct assessment of the patient and coordination of care.   Electronically signed by Galindo Mancini MD on 11/5/2021 at 4:55 PM

## 2021-11-05 NOTE — PROGRESS NOTES
Present to pt room for stage 3 pressure injury to sacrum POA. Rolled pt to left side for assessment of wound. Wound photo and assessment below. Cleansed wound with normal saline and gauze. Pat dry with clean gauze. Applied dakins moist kerlix to wound, covered with ABD pad and secured with hy-tape. Staff to continue to change twice daily. Pillow placed under left side. All extremities elevated. Pt on hercules dream air support surface with alternating pressure and microclimate control. Padilla catheter in place. Recommend staff to continue wound care orders followed per outpatient wound clinic as follows:    Sacrum- Apply zinc oxide cream (diaper rash cream) to skin around wound to protect it. Pack wound lightly with Dakins moistened roll gauze. Cover with gauze and ABD pad. Secure with pink hy-tape. Change twice daily and as needed if soiled.     11-4-21      Wound type: healing stage 3 pressure injury POA  Wound size: 4cm x 4.2cm x 2.9cm  Undermining or Tunneling: none  Wound assessment/color: red/yellow  Drainage amount: moderate  Drainage description: serosanguinous  Odor: none  Margins: attached  Katey wound: macerated, attached  Exposed structure: none

## 2021-11-05 NOTE — PROGRESS NOTES
[id number]- Dr. Bianka Chang and Dr. Richard Otto at bedside to place dialysis catheter. Time out done. 1700-CRRT started. Bumex turned off.

## 2021-11-05 NOTE — PROGRESS NOTES
Pt breathing becoming labored with O2 sats in high 8's. Will restart Fentanyl and titrate for pt comfort. Pt has copious oral and ETT secretions. CRNP aware. Pt still not following commands at this time. Ptopens eyes spontaneously, corneal reflex intact with persistently weak cough and gag.

## 2021-11-06 LAB
ALBUMIN SERPL-MCNC: 2.3 G/DL (ref 3.5–5.1)
ALBUMIN SERPL-MCNC: 2.3 G/DL (ref 3.5–5.1)
ALBUMIN SERPL-MCNC: 2.4 G/DL (ref 3.5–5.1)
ALBUMIN SERPL-MCNC: 2.5 G/DL (ref 3.5–5.1)
ALBUMIN SERPL-MCNC: 2.5 G/DL (ref 3.5–5.1)
ALP BLD-CCNC: 117 U/L (ref 38–126)
ALP BLD-CCNC: 129 U/L (ref 38–126)
ALP BLD-CCNC: 131 U/L (ref 38–126)
ALP BLD-CCNC: 134 U/L (ref 38–126)
ALP BLD-CCNC: 135 U/L (ref 38–126)
ALT SERPL-CCNC: 11 U/L (ref 11–66)
ALT SERPL-CCNC: 12 U/L (ref 11–66)
ALT SERPL-CCNC: 12 U/L (ref 11–66)
ALT SERPL-CCNC: 13 U/L (ref 11–66)
ALT SERPL-CCNC: 13 U/L (ref 11–66)
ANION GAP SERPL CALCULATED.3IONS-SCNC: 11 MEQ/L (ref 8–16)
ANION GAP SERPL CALCULATED.3IONS-SCNC: 11 MEQ/L (ref 8–16)
ANION GAP SERPL CALCULATED.3IONS-SCNC: 12 MEQ/L (ref 8–16)
ANION GAP SERPL CALCULATED.3IONS-SCNC: 13 MEQ/L (ref 8–16)
ANION GAP SERPL CALCULATED.3IONS-SCNC: 9 MEQ/L (ref 8–16)
AST SERPL-CCNC: 13 U/L (ref 5–40)
AST SERPL-CCNC: 14 U/L (ref 5–40)
AST SERPL-CCNC: 15 U/L (ref 5–40)
AST SERPL-CCNC: 15 U/L (ref 5–40)
AST SERPL-CCNC: 16 U/L (ref 5–40)
BASOPHILIC STIPPLING: ABNORMAL
BASOPHILS # BLD: 0.2 %
BASOPHILS ABSOLUTE: 0.1 THOU/MM3 (ref 0–0.1)
BILIRUB SERPL-MCNC: 0.2 MG/DL (ref 0.3–1.2)
BILIRUB SERPL-MCNC: 0.3 MG/DL (ref 0.3–1.2)
BILIRUB SERPL-MCNC: 0.3 MG/DL (ref 0.3–1.2)
BILIRUB SERPL-MCNC: 0.4 MG/DL (ref 0.3–1.2)
BILIRUB SERPL-MCNC: 0.4 MG/DL (ref 0.3–1.2)
BUN BLDV-MCNC: 20 MG/DL (ref 7–22)
BUN BLDV-MCNC: 30 MG/DL (ref 7–22)
BUN BLDV-MCNC: 33 MG/DL (ref 7–22)
BUN BLDV-MCNC: 39 MG/DL (ref 7–22)
BUN BLDV-MCNC: 42 MG/DL (ref 7–22)
CALCIUM IONIZED: 1.27 MMOL/L (ref 1.12–1.32)
CALCIUM IONIZED: 1.3 MMOL/L (ref 1.12–1.32)
CALCIUM IONIZED: 1.31 MMOL/L (ref 1.12–1.32)
CALCIUM IONIZED: 1.31 MMOL/L (ref 1.12–1.32)
CALCIUM IONIZED: 1.32 MMOL/L (ref 1.12–1.32)
CALCIUM IONIZED: 1.36 MMOL/L (ref 1.12–1.32)
CALCIUM SERPL-MCNC: 7.9 MG/DL (ref 8.5–10.5)
CALCIUM SERPL-MCNC: 8.3 MG/DL (ref 8.5–10.5)
CALCIUM SERPL-MCNC: 8.4 MG/DL (ref 8.5–10.5)
CALCIUM SERPL-MCNC: 8.4 MG/DL (ref 8.5–10.5)
CALCIUM SERPL-MCNC: 8.6 MG/DL (ref 8.5–10.5)
CHLORIDE BLD-SCNC: 96 MEQ/L (ref 98–111)
CHLORIDE BLD-SCNC: 98 MEQ/L (ref 98–111)
CHLORIDE BLD-SCNC: 99 MEQ/L (ref 98–111)
CO2: 21 MEQ/L (ref 23–33)
CO2: 22 MEQ/L (ref 23–33)
CO2: 23 MEQ/L (ref 23–33)
CO2: 23 MEQ/L (ref 23–33)
CO2: 24 MEQ/L (ref 23–33)
CREAT SERPL-MCNC: 1.1 MG/DL (ref 0.4–1.2)
CREAT SERPL-MCNC: 1.5 MG/DL (ref 0.4–1.2)
CREAT SERPL-MCNC: 1.6 MG/DL (ref 0.4–1.2)
CREAT SERPL-MCNC: 1.8 MG/DL (ref 0.4–1.2)
CREAT SERPL-MCNC: 2.1 MG/DL (ref 0.4–1.2)
CRENATED RBC'S: ABNORMAL
ELLIPTOCYTES: ABNORMAL
EOSINOPHIL # BLD: 0.4 %
EOSINOPHILS ABSOLUTE: 0.1 THOU/MM3 (ref 0–0.4)
ERYTHROCYTE [DISTWIDTH] IN BLOOD BY AUTOMATED COUNT: 18.2 % (ref 11.5–14.5)
ERYTHROCYTE [DISTWIDTH] IN BLOOD BY AUTOMATED COUNT: 18.2 % (ref 11.5–14.5)
ERYTHROCYTE [DISTWIDTH] IN BLOOD BY AUTOMATED COUNT: 18.6 % (ref 11.5–14.5)
ERYTHROCYTE [DISTWIDTH] IN BLOOD BY AUTOMATED COUNT: 18.7 % (ref 11.5–14.5)
ERYTHROCYTE [DISTWIDTH] IN BLOOD BY AUTOMATED COUNT: 18.7 % (ref 11.5–14.5)
ERYTHROCYTE [DISTWIDTH] IN BLOOD BY AUTOMATED COUNT: 18.9 % (ref 11.5–14.5)
ERYTHROCYTE [DISTWIDTH] IN BLOOD BY AUTOMATED COUNT: 54.1 FL (ref 35–45)
ERYTHROCYTE [DISTWIDTH] IN BLOOD BY AUTOMATED COUNT: 54.2 FL (ref 35–45)
ERYTHROCYTE [DISTWIDTH] IN BLOOD BY AUTOMATED COUNT: 54.6 FL (ref 35–45)
ERYTHROCYTE [DISTWIDTH] IN BLOOD BY AUTOMATED COUNT: 55.6 FL (ref 35–45)
ERYTHROCYTE [DISTWIDTH] IN BLOOD BY AUTOMATED COUNT: 55.8 FL (ref 35–45)
ERYTHROCYTE [DISTWIDTH] IN BLOOD BY AUTOMATED COUNT: 56.1 FL (ref 35–45)
GFR SERPL CREATININE-BSD FRML MDRD: 23 ML/MIN/1.73M2
GFR SERPL CREATININE-BSD FRML MDRD: 28 ML/MIN/1.73M2
GFR SERPL CREATININE-BSD FRML MDRD: 32 ML/MIN/1.73M2
GFR SERPL CREATININE-BSD FRML MDRD: 34 ML/MIN/1.73M2
GFR SERPL CREATININE-BSD FRML MDRD: 49 ML/MIN/1.73M2
GLUCOSE BLD-MCNC: 132 MG/DL (ref 70–108)
GLUCOSE BLD-MCNC: 136 MG/DL (ref 70–108)
GLUCOSE BLD-MCNC: 137 MG/DL (ref 70–108)
GLUCOSE BLD-MCNC: 140 MG/DL (ref 70–108)
GLUCOSE BLD-MCNC: 143 MG/DL (ref 70–108)
HCT VFR BLD CALC: 21.2 % (ref 37–47)
HCT VFR BLD CALC: 24 % (ref 37–47)
HCT VFR BLD CALC: 25.4 % (ref 37–47)
HCT VFR BLD CALC: 25.7 % (ref 37–47)
HCT VFR BLD CALC: 27.5 % (ref 37–47)
HCT VFR BLD CALC: 27.8 % (ref 37–47)
HCT VFR BLD CALC: 29.6 % (ref 37–47)
HEMOGLOBIN: 6.7 GM/DL (ref 12–16)
HEMOGLOBIN: 7.5 GM/DL (ref 12–16)
HEMOGLOBIN: 7.8 GM/DL (ref 12–16)
HEMOGLOBIN: 8 GM/DL (ref 12–16)
HEMOGLOBIN: 8.8 GM/DL (ref 12–16)
HEMOGLOBIN: 8.9 GM/DL (ref 12–16)
HEMOGLOBIN: 9.1 GM/DL (ref 12–16)
HEPARIN LOW MOLECULAR WEIGHT: < 0.04 U/ML
HEPARIN UNFRACTIONATED: < 0.04 U/ML (ref 0.3–0.7)
IMMATURE GRANS (ABS): 0.7 THOU/MM3 (ref 0–0.07)
IMMATURE GRANULOCYTES: 2.5 %
LYMPHOCYTES # BLD: 1.1 %
LYMPHOCYTES ABSOLUTE: 0.3 THOU/MM3 (ref 1–4.8)
MAGNESIUM: 1.9 MG/DL (ref 1.6–2.4)
MAGNESIUM: 1.9 MG/DL (ref 1.6–2.4)
MAGNESIUM: 2 MG/DL (ref 1.6–2.4)
MAGNESIUM: 2.1 MG/DL (ref 1.6–2.4)
MAGNESIUM: 2.1 MG/DL (ref 1.6–2.4)
MCH RBC QN AUTO: 26.2 PG (ref 26–33)
MCH RBC QN AUTO: 26.3 PG (ref 26–33)
MCH RBC QN AUTO: 26.3 PG (ref 26–33)
MCH RBC QN AUTO: 26.6 PG (ref 26–33)
MCH RBC QN AUTO: 27 PG (ref 26–33)
MCH RBC QN AUTO: 27.1 PG (ref 26–33)
MCHC RBC AUTO-ENTMCNC: 30.7 GM/DL (ref 32.2–35.5)
MCHC RBC AUTO-ENTMCNC: 31.1 GM/DL (ref 32.2–35.5)
MCHC RBC AUTO-ENTMCNC: 31.3 GM/DL (ref 32.2–35.5)
MCHC RBC AUTO-ENTMCNC: 31.6 GM/DL (ref 32.2–35.5)
MCHC RBC AUTO-ENTMCNC: 32 GM/DL (ref 32.2–35.5)
MCHC RBC AUTO-ENTMCNC: 32 GM/DL (ref 32.2–35.5)
MCV RBC AUTO: 84.1 FL (ref 81–99)
MCV RBC AUTO: 84.2 FL (ref 81–99)
MCV RBC AUTO: 84.3 FL (ref 81–99)
MCV RBC AUTO: 84.4 FL (ref 81–99)
MCV RBC AUTO: 84.5 FL (ref 81–99)
MCV RBC AUTO: 85.5 FL (ref 81–99)
MONOCYTES # BLD: 3.6 %
MONOCYTES ABSOLUTE: 1 THOU/MM3 (ref 0.4–1.3)
NUCLEATED RED BLOOD CELLS: 0 /100 WBC
PHOSPHORUS: 2.3 MG/DL (ref 2.4–4.7)
PHOSPHORUS: 2.6 MG/DL (ref 2.4–4.7)
PHOSPHORUS: 2.9 MG/DL (ref 2.4–4.7)
PHOSPHORUS: 3.2 MG/DL (ref 2.4–4.7)
PHOSPHORUS: 3.7 MG/DL (ref 2.4–4.7)
PLATELET # BLD: 121 THOU/MM3 (ref 130–400)
PLATELET # BLD: 125 THOU/MM3 (ref 130–400)
PLATELET # BLD: 126 THOU/MM3 (ref 130–400)
PLATELET # BLD: 130 THOU/MM3 (ref 130–400)
PLATELET # BLD: 141 THOU/MM3 (ref 130–400)
PLATELET # BLD: 142 THOU/MM3 (ref 130–400)
PMV BLD AUTO: 10.3 FL (ref 9.4–12.4)
PMV BLD AUTO: 10.5 FL (ref 9.4–12.4)
PMV BLD AUTO: 10.6 FL (ref 9.4–12.4)
PMV BLD AUTO: 10.8 FL (ref 9.4–12.4)
PMV BLD AUTO: 10.8 FL (ref 9.4–12.4)
PMV BLD AUTO: 9.9 FL (ref 9.4–12.4)
POTASSIUM REFLEX MAGNESIUM: 3.9 MEQ/L (ref 3.5–5.2)
POTASSIUM REFLEX MAGNESIUM: 4 MEQ/L (ref 3.5–5.2)
POTASSIUM REFLEX MAGNESIUM: 4 MEQ/L (ref 3.5–5.2)
POTASSIUM REFLEX MAGNESIUM: 4.1 MEQ/L (ref 3.5–5.2)
POTASSIUM REFLEX MAGNESIUM: 4.2 MEQ/L (ref 3.5–5.2)
POTASSIUM SERPL-SCNC: 4 MEQ/L (ref 3.5–5.2)
RBC # BLD: 2.52 MILL/MM3 (ref 4.2–5.4)
RBC # BLD: 2.85 MILL/MM3 (ref 4.2–5.4)
RBC # BLD: 2.97 MILL/MM3 (ref 4.2–5.4)
RBC # BLD: 3.05 MILL/MM3 (ref 4.2–5.4)
RBC # BLD: 3.26 MILL/MM3 (ref 4.2–5.4)
RBC # BLD: 3.29 MILL/MM3 (ref 4.2–5.4)
SCAN OF BLOOD SMEAR: NORMAL
SEG NEUTROPHILS: 92.2 %
SEGMENTED NEUTROPHILS ABSOLUTE COUNT: 26.3 THOU/MM3 (ref 1.8–7.7)
SODIUM BLD-SCNC: 130 MEQ/L (ref 135–145)
SODIUM BLD-SCNC: 132 MEQ/L (ref 135–145)
TOTAL PROTEIN: 4.2 G/DL (ref 6.1–8)
TOTAL PROTEIN: 4.8 G/DL (ref 6.1–8)
TOTAL PROTEIN: 5 G/DL (ref 6.1–8)
VANCOMYCIN RANDOM: 11.7 UG/ML (ref 0.1–39.9)
WBC # BLD: 20.8 THOU/MM3 (ref 4.8–10.8)
WBC # BLD: 21.2 THOU/MM3 (ref 4.8–10.8)
WBC # BLD: 22 THOU/MM3 (ref 4.8–10.8)
WBC # BLD: 28.5 THOU/MM3 (ref 4.8–10.8)
WBC # BLD: 29.2 THOU/MM3 (ref 4.8–10.8)
WBC # BLD: 32.2 THOU/MM3 (ref 4.8–10.8)

## 2021-11-06 PROCEDURE — 85014 HEMATOCRIT: CPT

## 2021-11-06 PROCEDURE — 6360000002 HC RX W HCPCS: Performed by: NURSE PRACTITIONER

## 2021-11-06 PROCEDURE — 6370000000 HC RX 637 (ALT 250 FOR IP): Performed by: FAMILY MEDICINE

## 2021-11-06 PROCEDURE — 83735 ASSAY OF MAGNESIUM: CPT

## 2021-11-06 PROCEDURE — 6360000002 HC RX W HCPCS

## 2021-11-06 PROCEDURE — 99291 CRITICAL CARE FIRST HOUR: CPT | Performed by: SURGERY

## 2021-11-06 PROCEDURE — 94761 N-INVAS EAR/PLS OXIMETRY MLT: CPT

## 2021-11-06 PROCEDURE — 2580000003 HC RX 258: Performed by: STUDENT IN AN ORGANIZED HEALTH CARE EDUCATION/TRAINING PROGRAM

## 2021-11-06 PROCEDURE — 2580000003 HC RX 258: Performed by: NURSE PRACTITIONER

## 2021-11-06 PROCEDURE — 2500000003 HC RX 250 WO HCPCS: Performed by: STUDENT IN AN ORGANIZED HEALTH CARE EDUCATION/TRAINING PROGRAM

## 2021-11-06 PROCEDURE — 2580000003 HC RX 258

## 2021-11-06 PROCEDURE — 6360000002 HC RX W HCPCS: Performed by: INTERNAL MEDICINE

## 2021-11-06 PROCEDURE — 2580000003 HC RX 258: Performed by: EMERGENCY MEDICINE

## 2021-11-06 PROCEDURE — 2580000003 HC RX 258: Performed by: INTERNAL MEDICINE

## 2021-11-06 PROCEDURE — 94003 VENT MGMT INPAT SUBQ DAY: CPT

## 2021-11-06 PROCEDURE — 82330 ASSAY OF CALCIUM: CPT

## 2021-11-06 PROCEDURE — 36415 COLL VENOUS BLD VENIPUNCTURE: CPT

## 2021-11-06 PROCEDURE — 6360000002 HC RX W HCPCS: Performed by: EMERGENCY MEDICINE

## 2021-11-06 PROCEDURE — 85018 HEMOGLOBIN: CPT

## 2021-11-06 PROCEDURE — 6360000002 HC RX W HCPCS: Performed by: FAMILY MEDICINE

## 2021-11-06 PROCEDURE — 84100 ASSAY OF PHOSPHORUS: CPT

## 2021-11-06 PROCEDURE — 6370000000 HC RX 637 (ALT 250 FOR IP): Performed by: STUDENT IN AN ORGANIZED HEALTH CARE EDUCATION/TRAINING PROGRAM

## 2021-11-06 PROCEDURE — 94640 AIRWAY INHALATION TREATMENT: CPT

## 2021-11-06 PROCEDURE — 80053 COMPREHEN METABOLIC PANEL: CPT

## 2021-11-06 PROCEDURE — C9113 INJ PANTOPRAZOLE SODIUM, VIA: HCPCS | Performed by: NURSE PRACTITIONER

## 2021-11-06 PROCEDURE — 6370000000 HC RX 637 (ALT 250 FOR IP): Performed by: INTERNAL MEDICINE

## 2021-11-06 PROCEDURE — 36592 COLLECT BLOOD FROM PICC: CPT

## 2021-11-06 PROCEDURE — 85025 COMPLETE CBC W/AUTO DIFF WBC: CPT

## 2021-11-06 PROCEDURE — 2500000003 HC RX 250 WO HCPCS: Performed by: NURSE PRACTITIONER

## 2021-11-06 PROCEDURE — 99233 SBSQ HOSP IP/OBS HIGH 50: CPT | Performed by: INTERNAL MEDICINE

## 2021-11-06 PROCEDURE — 85027 COMPLETE CBC AUTOMATED: CPT

## 2021-11-06 PROCEDURE — 85520 HEPARIN ASSAY: CPT

## 2021-11-06 PROCEDURE — 6360000002 HC RX W HCPCS: Performed by: STUDENT IN AN ORGANIZED HEALTH CARE EDUCATION/TRAINING PROGRAM

## 2021-11-06 PROCEDURE — 80202 ASSAY OF VANCOMYCIN: CPT

## 2021-11-06 PROCEDURE — 2000000000 HC ICU R&B

## 2021-11-06 RX ORDER — SODIUM CHLORIDE 9 MG/ML
INJECTION, SOLUTION INTRAVENOUS PRN
Status: DISCONTINUED | OUTPATIENT
Start: 2021-11-06 | End: 2021-11-08

## 2021-11-06 RX ADMIN — SODIUM CHLORIDE, PRESERVATIVE FREE 10 ML: 5 INJECTION INTRAVENOUS at 09:18

## 2021-11-06 RX ADMIN — FENTANYL CITRATE 75 MCG/HR: 50 INJECTION INTRAMUSCULAR; INTRAVENOUS at 12:38

## 2021-11-06 RX ADMIN — POLYETHYLENE GLYCOL 3350 17 G: 17 POWDER, FOR SOLUTION ORAL at 09:18

## 2021-11-06 RX ADMIN — Medication: at 04:40

## 2021-11-06 RX ADMIN — FENTANYL CITRATE 125 MCG/HR: 50 INJECTION INTRAMUSCULAR; INTRAVENOUS at 00:35

## 2021-11-06 RX ADMIN — POTASSIUM & SODIUM PHOSPHATES POWDER PACK 280-160-250 MG 250 MG: 280-160-250 PACK at 22:15

## 2021-11-06 RX ADMIN — CEFEPIME HYDROCHLORIDE 2000 MG: 2 INJECTION, POWDER, FOR SOLUTION INTRAVENOUS at 22:33

## 2021-11-06 RX ADMIN — Medication: at 13:55

## 2021-11-06 RX ADMIN — VASOPRESSIN 0.04 UNITS/MIN: 20 INJECTION INTRAVENOUS at 19:37

## 2021-11-06 RX ADMIN — AMIODARONE HYDROCHLORIDE 0.5 MG/MIN: 1.8 INJECTION, SOLUTION INTRAVENOUS at 22:33

## 2021-11-06 RX ADMIN — Medication: at 20:56

## 2021-11-06 RX ADMIN — PANTOPRAZOLE SODIUM 40 MG: 40 INJECTION, POWDER, FOR SOLUTION INTRAVENOUS at 09:18

## 2021-11-06 RX ADMIN — ALLOPURINOL 300 MG: 300 TABLET ORAL at 09:18

## 2021-11-06 RX ADMIN — ALBUTEROL SULFATE 2.5 MG: 2.5 SOLUTION RESPIRATORY (INHALATION) at 12:28

## 2021-11-06 RX ADMIN — SODIUM HYPOCHLORITE: 1.25 SOLUTION TOPICAL at 09:19

## 2021-11-06 RX ADMIN — SENNOSIDES 8.6 MG: 8.6 TABLET, COATED ORAL at 09:18

## 2021-11-06 RX ADMIN — ALBUTEROL SULFATE 2.5 MG: 2.5 SOLUTION RESPIRATORY (INHALATION) at 01:13

## 2021-11-06 RX ADMIN — CEFEPIME HYDROCHLORIDE 2000 MG: 2 INJECTION, POWDER, FOR SOLUTION INTRAVENOUS at 10:28

## 2021-11-06 RX ADMIN — TIOTROPIUM BROMIDE AND OLODATEROL 2 PUFF: 3.124; 2.736 SPRAY, METERED RESPIRATORY (INHALATION) at 07:54

## 2021-11-06 RX ADMIN — ALBUTEROL SULFATE 2.5 MG: 2.5 SOLUTION RESPIRATORY (INHALATION) at 18:17

## 2021-11-06 RX ADMIN — Medication: at 04:41

## 2021-11-06 RX ADMIN — Medication: at 10:24

## 2021-11-06 RX ADMIN — ASPIRIN 81 MG CHEWABLE TABLET 81 MG: 81 TABLET CHEWABLE at 09:18

## 2021-11-06 RX ADMIN — AMIODARONE HYDROCHLORIDE 0.5 MG/MIN: 1.8 INJECTION, SOLUTION INTRAVENOUS at 09:18

## 2021-11-06 RX ADMIN — ALBUTEROL SULFATE 2.5 MG: 2.5 SOLUTION RESPIRATORY (INHALATION) at 06:33

## 2021-11-06 RX ADMIN — HEPARIN SODIUM: 1000 INJECTION, SOLUTION INTRAVENOUS; SUBCUTANEOUS at 11:18

## 2021-11-06 RX ADMIN — Medication: at 13:54

## 2021-11-06 RX ADMIN — VASOPRESSIN 0.04 UNITS/MIN: 20 INJECTION INTRAVENOUS at 09:33

## 2021-11-06 RX ADMIN — Medication: at 17:29

## 2021-11-06 RX ADMIN — PHENYLEPHRINE HYDROCHLORIDE 240 MCG/MIN: 10 INJECTION INTRAVENOUS at 00:54

## 2021-11-06 RX ADMIN — VANCOMYCIN HYDROCHLORIDE 1500 MG: 5 INJECTION, POWDER, LYOPHILIZED, FOR SOLUTION INTRAVENOUS at 15:12

## 2021-11-06 RX ADMIN — Medication: at 22:12

## 2021-11-06 RX ADMIN — Medication: at 10:28

## 2021-11-06 RX ADMIN — Medication: at 12:07

## 2021-11-06 RX ADMIN — SODIUM CHLORIDE, PRESERVATIVE FREE 10 ML: 5 INJECTION INTRAVENOUS at 20:56

## 2021-11-06 ASSESSMENT — PULMONARY FUNCTION TESTS
PIF_VALUE: 28
PIF_VALUE: 28
PIF_VALUE: 22
PIF_VALUE: 29
PIF_VALUE: 27
PIF_VALUE: 28

## 2021-11-06 ASSESSMENT — PAIN SCALES - GENERAL: PAINLEVEL_OUTOF10: 0

## 2021-11-06 NOTE — PROGRESS NOTES
[facility]   Nephrology progress note  11/6/2021, 12:27 PM      Pt Name:    Mora Tran  MRN:     [id number]     [place]:    1950  Admit Date:    10/27/2021  1:36 PM  Primary Care Physician:  Celsa Riddle MD   Room number  4D-09/009-A    Chief Complaint: Nephrology following for ALVIN and CVVH    Subjective:  Patient seen and examined  Seen earlier today during rounds  This is late entry  Discussed with family members in the room  Discussed with ICU RN  CVVH data reviewed  Patient is currently on 50% FiO2  On Benjamin-Synephrine  On vasopressin  Net ultrafiltration of 100 mL/h being done      Objective:  24HR INTAKE/OUTPUT:    Intake/Output Summary (Last 24 hours) at 11/6/2021 1227  Last data filed at 11/6/2021 1200  Gross per 24 hour   Intake 3303.78 ml   Output 3621 ml   Net -317.22 ml     I/O last 3 completed shifts: In: 2713.9 [I.V.:1619.9; Blood:313; NG/GT:781]  Out: 7463 [GYBVJ:0229]  I/O this shift: In: 729.9 [I.V.:417.9; NG/GT:312]  Out: 0704 [PHJAO:607]  Admission weight: 165 lb (74.8 kg)  Wt Readings from Last 3 Encounters:   11/06/21 206 lb 12.7 oz (93.8 kg)   10/25/21 164 lb (74.4 kg)   09/29/21 160 lb (72.6 kg)     Body mass index is 44.75 kg/m². Physical examination  VITALS:     Vitals:    11/06/21 1000 11/06/21 1100 11/06/21 1200 11/06/21 1212   BP: (!) 112/48 (!) 89/43 (!) 119/46 (!) 119/46   Pulse: 65 65 78 75   Resp: 16 11 17 16   Temp:   98.1 °F (36.7 °C) 98.4 °F (36.9 °C)   TempSrc:   Oral    SpO2: 92% 92% 90%    Weight:       Height:         General Appearance: Intubated and sedated  On pressors  Mouth/Throat: Intubated  Neck: No JVD noted  Lungs:  On ventilator  GI: soft  Extremities: Bilateral edema      Lab Data  CBC:   Recent Labs     11/06/21  0410 11/06/21  0814 11/06/21  1115   WBC 28.5* 29.2* 22.0*   HGB 7.8* 7.5* 6.7*   HCT 25.4* 24.0* 21.2*    130 126*     BMP:  Recent Labs     11/06/21  0410 11/06/21  0814 11/06/21  1115   * 132* 132*   K 4.0  4.0 4.1 4.0   CL 98 98 98   CO2 22* 23 23   BUN 39* 33* 30*   CREATININE 1.8* 1.6* 1.5*   GLUCOSE 140* 143* 136*   CALCIUM 8.3* 8.4* 7.9*   MG 1.9 2.1 1.9   PHOS 3.2 2.9 2.6     Hepatic:   Recent Labs     11/06/21  0410 11/06/21  0814 11/06/21  1115   LABALBU 2.4* 2.3* 2.3*   AST 14 15 13   ALT 13 12 11   BILITOT 0.3 0.3 0.2*   ALKPHOS 131* 129* 117         Meds:  Infusion:    heparin 5000 units CRRT syringe 1 mL/hr at 11/06/21 1118    sodium chloride      bumetanide 0.1 mg/mL infusion 0.5 mg/hr (11/05/21 0904)    prismaSATE BGK 4/2.5 1,500 mL/hr at 11/06/21 1024    prismaSol BGK 4/2.5 1,500 mL/hr at 11/06/21 1028    prismaSol BGK 4/2.5 500 mL/hr at 11/06/21 1207    sodium chloride      sodium chloride      vasopressin (Septic Shock) infusion 0.04 Units/min (11/06/21 0933)    phenylephrine (KRISTAL-SYNEPHRINE) 250 mg/250 mL infusion 200 mcg/min (11/06/21 1109)    sodium chloride      fentaNYL (SUBLIMAZE) 1250 mcg in sodium chloride 0.9 % 250 mL 75 mcg/hr (11/06/21 1109)    sodium chloride 25 mL (10/30/21 0129)    amiodarone 0.5 mg/min (11/06/21 0918)    [Held by provider] heparin (PORCINE) Infusion Stopped (11/04/21 0134)    [Held by provider] sodium chloride 50 mL/hr at 10/28/21 2314    midazolam Stopped (11/04/21 0830)     Meds:    cefepime  2,000 mg IntraVENous Q12H    tiotropium-olodaterol  2 puff Inhalation Daily    aspirin  81 mg Oral Daily    vancomycin (VANCOCIN) intermittent dosing (placeholder)   Other RX Placeholder    pantoprazole  40 mg IntraVENous QAM    sodium hypochlorite   Irrigation Daily    Riociguat  2.5 mg Oral Q8H    sodium chloride flush  5-40 mL IntraVENous 2 times per day    allopurinol  300 mg Oral Daily    [Held by provider] FLUoxetine  40 mg Oral Daily    [Held by provider] metoprolol tartrate  12.5 mg Oral BID    albuterol  2.5 mg Nebulization Q6H     Meds prn: sodium chloride, potassium chloride, magnesium sulfate, sodium phosphate IVPB **OR** sodium phosphate IVPB **OR** sodium phosphate IVPB **OR** sodium phosphate IVPB, sodium chloride, sodium chloride, polyethylene glycol, senna, sodium chloride, acetaminophen, sodium chloride, heparin (porcine), heparin (porcine), sodium chloride flush       Impression and Plan:  1. ALVIN secondary to hypotension/sepsis and ischemic ATN  Currently on CVVH  Input and output reviewed  CVVH data reviewed  We will add heparin prefilter due to filter clotting  Monitor factor Xa levels  2. Volume overload. Removing net 100 mL/h ultrafiltration  3. Acute hypoxic respiratory failure secondary to severe pneumonia  4. Septic shock  5. Hydronephrosis secondary to staghorn calculi. Patient is status post percutaneous drainage  6. Hyponatremia improving  7.   Pleural effusions    D/W ICU RN and family members    Ghada Cardona MD  [facility]

## 2021-11-06 NOTE — PROGRESS NOTES
Pharmacy Vancomycin Consult     Vancomycin Day: 10  Current Dosing: Has been on hold for supratherapeutic levels  Current indication: Pneumonia    Temp max:  98.8    Recent Labs     11/06/21  0814 11/06/21  1115   BUN 33* 30*   CREATININE 1.6* 1.5*   WBC 29.2* 22.0*       Intake/Output Summary (Last 24 hours) at 11/6/2021 1332  Last data filed at 11/6/2021 1300  Gross per 24 hour   Intake 3346.14 ml   Output 3816 ml   Net -469.86 ml     Cultures  Date Source Results   10/27/2021 Blood x 2 No growth - prelim   10/27/2021 Pneumonia PCR MRSA & Adenovirus   10/27/2021 Pleural fluid No organisms observed   10/27/2021 Urine No growth   10/28/2021 Respiratory culture MSSA   11/5/21 ET tube Many gram positive cocci in pairs and clusters. 11/5/21 Nephrostomy tube NGTD       Ht Readings from Last 1 Encounters:   10/28/21 4' 9\" (1.448 m)        Wt Readings from Last 1 Encounters:   11/06/21 206 lb 12.7 oz (93.8 kg)       Body mass index is 44.75 kg/m². CrCl cannot be calculated (Unknown ideal weight.). Random Level: 11.7 mcg/mL    Assessment/Plan:  Patient was started on CVVHD-F yesterday. Level is now subtherapeutic. Will re-dose with Vancomycin 1500 mg IVPB x 1 dose today (15 mg/kg) with a random level ~24 hours after dose to guide future re-dosing. Pharmacy will continue to follow.     Mame Leija, Raúl, BCPS  11/6/2021  1:36 PM

## 2021-11-06 NOTE — PLAN OF CARE
Problem: RESPIRATORY  Goal: Normal spontaneous ventilation  Description:      Outcome: Ongoing  Note: Vent settings optimized to achieve target tidal volume, respiratory rate and ideal oxygen saturations. Will continue to wean as patient tolerates. SBT will be performed when appropriate.

## 2021-11-06 NOTE — PROGRESS NOTES
CRITICAL CARE PROGRESS NOTE      Patient:  Jakob Gaitan    Unit/Bed:[id number]-09/009-A  YOB: 1950  MRN: [id number]   PCP: Zain Barnett MD  Date of Admission: 10/27/2021  Chief Complaint:- Shortness of breath    Assessment and Plan:    1. Acute combined hypercapnic and hypoxic respiratory failure: Secondary to severe pneumonia with left-sided pleural effusion. Currently on pressure control ventilation with Pressure control 14 PEEP 6 FiO2 30%. Trial of SBP today. Will wean sedation and attempt extubation if patient continues to improve  2. Severe bilateral multiorganism pneumonia: PCR positive Staph w/ MECA gene and adenovirus. Culture growing MSSA. Day 9/14 Vancomycin (started 10/28/21) and completed 5 days Rocephin. 3. Septic Shock: 2/2 severe PNA. CI 6.3 NE d/c 2/2 worsening afib RVR. NE d/c on 11/3/21 2/2 worsening afib/RVR. .Currently on pressor support w/ phenylephrine. Nephrostomy and repeat cultures 11/5 pending on cefipmie emperically   4. Stage II ALVIN on CKD 3: Suspect post renal etiology 2/2 staghorn calculi. Nephrology following. CRRT initiated yesterday . Of note patient does have baseline CKD positive RYLAN screen suggests underlying autoimmune etiology. Mitochondrial Abs, Anti-DS-DNA, Anti histone, Anti Banks, Anti-Savana, ANCA pending. GBM antibodies negative, C3/C4 levels normal, elevated kappa/lambda free light chains with normal ratio. No plans per Bx per nephrology. Fluid collection noted on left renal unlikely abscess as patient remains afebrile. .  5. Hydronephrosis 2/2 Left-sided staghorn calculi: Urology following. Percutaneous drainage tube 11/2/21 IR. Low output following tube placement. Increased flow on CBI per urology recs. Still flushing clots  6. Hematuria: s/p perc tube placement. CBI per above. Holding Heparin Given 1 unit PRBC  7. Hyponatremia: Suspect 2/2 renal failure. Unable to obtain urine Na 2/2 bladder irrigation. Bumex on 11/5- 1014 urine out over 24 hours. 8. Bilateral Pleural Effusion:  2/2 PNA per above with associated volume overload in context of renal failure. Interval enlargement noted on CT 11/1/21. Improvemed in concomitant pericardial effusion  9. New onset atrial fibrillation with rapid ventricular response: NSR s/p DCCV 11/3/21 Holding Heparin per hematuria. Will continue Amiodarone. Bridge to Eliquis once stable. Holding metoprolol 2/2 hypotension  10. Suspected COPD: current every day smoker. Obstructive process noted on flow volume loop on ventilator. Started empiric therapy with Stiolto. Recommend formal PFT once improved  11. PAH/chronic RV failure NYHA II: EF 55 to 60% G2 DD TTE 5/4/2021. RHC present. Treated w/ Riociguat. 12. MERCEDES: hematuria s/p perc tube placement , given one unit PRBC overnight  13. Cholelithiasis: w/ dilated CBD. Noted on 10/30/2021 US liver/GB. GI following. Does not suspect choledocolithiasis at this time. May consider HIDA scan once stable   14. Stage III decubitus ulcer with inferior tunneling: S/p excision VAC placement 8/21. Low suspicion for active infection  15. Moderate malnutrition: Pre-albumin 14.4 w/ notable cachexia  16. Chronic tobacco use:   cessation  17. Mild AS: Noted on previous TTE not appreciated on repeat imaging  18. Gout:  W/o exacerbation. Continue allopurinol  19. BARBER: non compliant w/ CPAP  20. Hyperkalemia (resolved): 2/2 ALVIN  21. Nutrition: on TF @30 ml       INITIAL H AND P AND ICU COURSE:  71F admitted to [facility] 10/27/21 w/ SOB. Current smoker w/ PMH PAH grp 3, HFpEF, stage III sacral ulcer s/p wound ostomy 9/21. Patient sister reports SpO2 51% at home and was brought to ED where ABG showed pH 7.19, PCO2 80, PO2 134. She required emergent intubation and was transferred to ICU. Workup revealed left sided pleural effusion and underwent US guided thoracentesis w/ 1100 cc removed consistent w/ MRSA/adenovirus. Patient was noted to be in afib/RVR and was placed on amio, heparin drip.  Patient was also noted to have normocytic anemia and received 1 unit PRBC 10/31/21. CT abdomen revealed CBD dilation w/ cholelithiasis. 11/1/2021: Plan for nephrostomy tubes today. Hemoglobin 7.7 s/p 1 unit PRBC yesterday. Weaned to 4 mics Levophed. 11/2/2021: Patient resumed back on prior dose of vancomycin. Low urine output w/ increasing pressor requirements today    11/3/2021: Will attempt SBT if able to wean today. Increased Amio drip 2/2 worsening tachycardia. Continued hematuria w/ low urine output. Cr stable. Will evaluate UTI following perc tube placement. Rise in WBC noted. Culture pending    11/4/2021: Cardioversion on 11/3/2021. Now and NSR. CVP 29 this a.m. Suspect drop in CI 2/2 to stopping levophed. Diffuse edema following transfusion overnight. Given one-time dose of 2 mg Bumex and albumin. SBT this AM    11/5/2021: Failed SBT yesterday. Continued low urine output. Trial of Bumex today. Will proceed with dialysis if fails to improve. 11/6/2021: Started on CRRT, continues to have bloody drainage of nephrostomy tube given 1 unit PRBC, cefipime emperically pending cultures of nephrostomy tube fluid and repeat resp cultures. Past Medical History:  Per HPI. Family History:  DM in father and sister. Social History: chronic smoker.     ROS   Unable to obtain a comprehensive ROS as patient sedated and intubated     Scheduled Meds:   cefepime  2,000 mg IntraVENous Q12H    tiotropium-olodaterol  2 puff Inhalation Daily    aspirin  81 mg Oral Daily    vancomycin (VANCOCIN) intermittent dosing (placeholder)   Other RX Placeholder    pantoprazole  40 mg IntraVENous QAM    sodium hypochlorite   Irrigation Daily    Riociguat  2.5 mg Oral Q8H    sodium chloride flush  5-40 mL IntraVENous 2 times per day    allopurinol  300 mg Oral Daily    [Held by provider] FLUoxetine  40 mg Oral Daily    [Held by provider] metoprolol tartrate  12.5 mg Oral BID    albuterol  2.5 mg Nebulization Q6H Continuous Infusions:   bumetanide 0.1 mg/mL infusion 0.5 mg/hr (11/05/21 0904)    prismaSATE BGK 4/2.5 900 mL/hr at 11/06/21 0441    prismaSol BGK 4/2.5 900 mL/hr at 11/06/21 0440    prismaSol BGK 4/2.5 900 mL/hr at 11/06/21 0441    sodium chloride      sodium chloride      vasopressin (Septic Shock) infusion 0.04 Units/min (11/05/21 2307)    phenylephrine (KRISTAL-SYNEPHRINE) 250 mg/250 mL infusion 190 mcg/min (11/06/21 0500)    sodium chloride      fentaNYL (SUBLIMAZE) 1250 mcg in sodium chloride 0.9 % 250 mL 125 mcg/hr (11/06/21 0035)    sodium chloride 25 mL (10/30/21 0129)    amiodarone 0.5 mg/min (11/05/21 2105)    [Held by provider] heparin (PORCINE) Infusion Stopped (11/04/21 0134)    [Held by provider] sodium chloride 50 mL/hr at 10/28/21 2314    midazolam Stopped (11/04/21 0830)       PHYSICAL EXAMINATION:  T:  98.4.  P: 68 RR:  18. B/P:  104/53  FiO2:  50. O2 Sat:  96%. I/O: 2547/66686 (irrigation)  Body mass index is 44.75 kg/m². GCS:   8  PC: 28 PEEP: 8: TV: 400: RRTotal: 11: General: Acute on chronically ill-appearing elderly white female  HEENT:  normocephalic and atraumatic. No scleral icterus. PERR  Neck: supple. No Thyromegaly. Left subclavian dialysis catheter   Lungs: Diffuse coarse rhonchi appreciated. No retractions  Cardiac: Irregular tachycardiac. No JVD. Abdomen: soft. Nontender. ,npehrostomy tube with bloody drainage  Extremities:  +1 pitting edema x4. noted No clubbing, cyanosis   Vasculature: capillary refill < 3 seconds. Palpable dorsalis pedis pulses. Skin:  warm and dry. Psych: Unable to assess as patient is currently sedated and on mechanical ventilation  Lymph:  No supraclavicular adenopathy. Neurologic:  No focal deficit. No seizures. Data: (All radiographs, tracings, PFTs, and imaging are personally viewed and interpreted unless otherwise noted).     BMP : Sodium 130, potassium 4.2, chloride 196, CO2 21, creatinine 2.1   CBC : WBC 28.5 down from 32.2, hematocrit 25.4 platelets 717   Telemetry shows NSR   Chest x-ray 10/31/2021 demonstrated diffuse bilateral patchy opacities with left-sided pleural effusion and cardiomegaly    Respiratory culture  11/5/21 pending   Pneumonia panel collected 10/27/2021 demonstrates staph aureus. MEC-A gene and adenovirus   CT chest on 11/1/2021 demonstrates bilateral pleural effusions with interval enlargement   CXR 11/3/2021 demonstrates worsening pulmonary congestion with bilateral effusions   UA shows bacteria w/ moderate white cells   CT Abdomen and pelvis 11/5/21 showed perinephric stranding with left-sided hypodense nodule with air-fluid level concerning for abscess. Diffuse ascites and anasarca noted. Seen with multidisciplinary ICU team.  Meets Continued ICU Level Care Criteria:    [x] Yes   [] No - Transfer Planned to listed location:  [] HOSPITALIST CONTACTED- DR GOMEZ time 35 minutes. Time was discontiguous. Time does not include procedures. Time does include my direct assessment of the patient and coordination of care.     Electronically signed by Maisha Castillo MD

## 2021-11-07 ENCOUNTER — APPOINTMENT (OUTPATIENT)
Dept: GENERAL RADIOLOGY | Age: 71
DRG: 004 | End: 2021-11-07
Payer: MEDICARE

## 2021-11-07 LAB
ALBUMIN SERPL-MCNC: 2.3 G/DL (ref 3.5–5.1)
ALBUMIN SERPL-MCNC: 2.4 G/DL (ref 3.5–5.1)
ALBUMIN SERPL-MCNC: 2.5 G/DL (ref 3.5–5.1)
ALP BLD-CCNC: 125 U/L (ref 38–126)
ALP BLD-CCNC: 125 U/L (ref 38–126)
ALP BLD-CCNC: 126 U/L (ref 38–126)
ALP BLD-CCNC: 127 U/L (ref 38–126)
ALP BLD-CCNC: 128 U/L (ref 38–126)
ALP BLD-CCNC: 132 U/L (ref 38–126)
ALT SERPL-CCNC: 12 U/L (ref 11–66)
ALT SERPL-CCNC: 13 U/L (ref 11–66)
ALT SERPL-CCNC: 13 U/L (ref 11–66)
ANION GAP SERPL CALCULATED.3IONS-SCNC: 7 MEQ/L (ref 8–16)
ANION GAP SERPL CALCULATED.3IONS-SCNC: 7 MEQ/L (ref 8–16)
ANION GAP SERPL CALCULATED.3IONS-SCNC: 8 MEQ/L (ref 8–16)
ANION GAP SERPL CALCULATED.3IONS-SCNC: 8 MEQ/L (ref 8–16)
ANION GAP SERPL CALCULATED.3IONS-SCNC: 9 MEQ/L (ref 8–16)
ANION GAP SERPL CALCULATED.3IONS-SCNC: 9 MEQ/L (ref 8–16)
ANTI JO-1 IGG: 2 AU/ML (ref 0–40)
ANTI-SMITH: 2 AU/ML (ref 0–40)
AST SERPL-CCNC: 14 U/L (ref 5–40)
AST SERPL-CCNC: 15 U/L (ref 5–40)
AST SERPL-CCNC: 15 U/L (ref 5–40)
AST SERPL-CCNC: 16 U/L (ref 5–40)
BASOPHILS # BLD: 0.2 %
BASOPHILS ABSOLUTE: 0 THOU/MM3 (ref 0–0.1)
BILIRUB SERPL-MCNC: 0.3 MG/DL (ref 0.3–1.2)
BILIRUB SERPL-MCNC: 0.3 MG/DL (ref 0.3–1.2)
BILIRUB SERPL-MCNC: 0.4 MG/DL (ref 0.3–1.2)
BILIRUB SERPL-MCNC: 0.4 MG/DL (ref 0.3–1.2)
BILIRUB SERPL-MCNC: 0.5 MG/DL (ref 0.3–1.2)
BILIRUB SERPL-MCNC: 0.5 MG/DL (ref 0.3–1.2)
BUN BLDV-MCNC: 12 MG/DL (ref 7–22)
BUN BLDV-MCNC: 12 MG/DL (ref 7–22)
BUN BLDV-MCNC: 13 MG/DL (ref 7–22)
BUN BLDV-MCNC: 14 MG/DL (ref 7–22)
BUN BLDV-MCNC: 15 MG/DL (ref 7–22)
BUN BLDV-MCNC: 19 MG/DL (ref 7–22)
CALCIUM IONIZED: 1.25 MMOL/L (ref 1.12–1.32)
CALCIUM IONIZED: 1.28 MMOL/L (ref 1.12–1.32)
CALCIUM IONIZED: 1.29 MMOL/L (ref 1.12–1.32)
CALCIUM IONIZED: 1.31 MMOL/L (ref 1.12–1.32)
CALCIUM IONIZED: 1.31 MMOL/L (ref 1.12–1.32)
CALCIUM SERPL-MCNC: 8 MG/DL (ref 8.5–10.5)
CALCIUM SERPL-MCNC: 8 MG/DL (ref 8.5–10.5)
CALCIUM SERPL-MCNC: 8.1 MG/DL (ref 8.5–10.5)
CALCIUM SERPL-MCNC: 8.1 MG/DL (ref 8.5–10.5)
CALCIUM SERPL-MCNC: 8.2 MG/DL (ref 8.5–10.5)
CALCIUM SERPL-MCNC: 8.2 MG/DL (ref 8.5–10.5)
CHLORIDE BLD-SCNC: 100 MEQ/L (ref 98–111)
CHLORIDE BLD-SCNC: 101 MEQ/L (ref 98–111)
CHLORIDE BLD-SCNC: 102 MEQ/L (ref 98–111)
CHLORIDE BLD-SCNC: 102 MEQ/L (ref 98–111)
CO2: 24 MEQ/L (ref 23–33)
CO2: 24 MEQ/L (ref 23–33)
CO2: 25 MEQ/L (ref 23–33)
CO2: 26 MEQ/L (ref 23–33)
CREAT SERPL-MCNC: 0.6 MG/DL (ref 0.4–1.2)
CREAT SERPL-MCNC: 0.7 MG/DL (ref 0.4–1.2)
CREAT SERPL-MCNC: 0.8 MG/DL (ref 0.4–1.2)
CREAT SERPL-MCNC: 1 MG/DL (ref 0.4–1.2)
DSDNA ANTIBODY: NORMAL
EOSINOPHIL # BLD: 1.3 %
EOSINOPHILS ABSOLUTE: 0.2 THOU/MM3 (ref 0–0.4)
ERYTHROCYTE [DISTWIDTH] IN BLOOD BY AUTOMATED COUNT: 18.9 % (ref 11.5–14.5)
ERYTHROCYTE [DISTWIDTH] IN BLOOD BY AUTOMATED COUNT: 19 % (ref 11.5–14.5)
ERYTHROCYTE [DISTWIDTH] IN BLOOD BY AUTOMATED COUNT: 19 % (ref 11.5–14.5)
ERYTHROCYTE [DISTWIDTH] IN BLOOD BY AUTOMATED COUNT: 19.1 % (ref 11.5–14.5)
ERYTHROCYTE [DISTWIDTH] IN BLOOD BY AUTOMATED COUNT: 19.2 % (ref 11.5–14.5)
ERYTHROCYTE [DISTWIDTH] IN BLOOD BY AUTOMATED COUNT: 19.3 % (ref 11.5–14.5)
ERYTHROCYTE [DISTWIDTH] IN BLOOD BY AUTOMATED COUNT: 55.7 FL (ref 35–45)
ERYTHROCYTE [DISTWIDTH] IN BLOOD BY AUTOMATED COUNT: 56.2 FL (ref 35–45)
ERYTHROCYTE [DISTWIDTH] IN BLOOD BY AUTOMATED COUNT: 57.1 FL (ref 35–45)
ERYTHROCYTE [DISTWIDTH] IN BLOOD BY AUTOMATED COUNT: 57.4 FL (ref 35–45)
ERYTHROCYTE [DISTWIDTH] IN BLOOD BY AUTOMATED COUNT: 58.2 FL (ref 35–45)
ERYTHROCYTE [DISTWIDTH] IN BLOOD BY AUTOMATED COUNT: 58.3 FL (ref 35–45)
GFR SERPL CREATININE-BSD FRML MDRD: 55 ML/MIN/1.73M2
GFR SERPL CREATININE-BSD FRML MDRD: 71 ML/MIN/1.73M2
GFR SERPL CREATININE-BSD FRML MDRD: 83 ML/MIN/1.73M2
GFR SERPL CREATININE-BSD FRML MDRD: > 90 ML/MIN/1.73M2
GLUCOSE BLD-MCNC: 124 MG/DL (ref 70–108)
GLUCOSE BLD-MCNC: 129 MG/DL (ref 70–108)
GLUCOSE BLD-MCNC: 132 MG/DL (ref 70–108)
GLUCOSE BLD-MCNC: 133 MG/DL (ref 70–108)
GLUCOSE BLD-MCNC: 134 MG/DL (ref 70–108)
GLUCOSE BLD-MCNC: 143 MG/DL (ref 70–108)
GLUCOSE, WHOLE BLOOD: 127 MG/DL (ref 70–108)
HCT VFR BLD CALC: 23.2 % (ref 37–47)
HCT VFR BLD CALC: 23.9 % (ref 37–47)
HCT VFR BLD CALC: 24 % (ref 37–47)
HCT VFR BLD CALC: 24.8 % (ref 37–47)
HCT VFR BLD CALC: 24.9 % (ref 37–47)
HCT VFR BLD CALC: 26.2 % (ref 37–47)
HCT VFR BLD CALC: 26.5 % (ref 37–47)
HEMOGLOBIN: 7.3 GM/DL (ref 12–16)
HEMOGLOBIN: 7.4 GM/DL (ref 12–16)
HEMOGLOBIN: 7.5 GM/DL (ref 12–16)
HEMOGLOBIN: 7.7 GM/DL (ref 12–16)
HEMOGLOBIN: 7.7 GM/DL (ref 12–16)
HEMOGLOBIN: 8.1 GM/DL (ref 12–16)
HEMOGLOBIN: 8.3 GM/DL (ref 12–16)
HEPARIN LOW MOLECULAR WEIGHT: < 0.04 U/ML
HEPARIN UNFRACTIONATED: < 0.04 U/ML (ref 0.3–0.7)
HEPARIN UNFRACTIONATED: < 0.04 U/ML (ref 0.3–0.7)
HISTONE ANTIBODY IGG: NORMAL
IMMATURE GRANS (ABS): 0.16 THOU/MM3 (ref 0–0.07)
IMMATURE GRANULOCYTES: 1.1 %
LYMPHOCYTES # BLD: 2.5 %
LYMPHOCYTES ABSOLUTE: 0.4 THOU/MM3 (ref 1–4.8)
MAGNESIUM: 2 MG/DL (ref 1.6–2.4)
MAGNESIUM: 2.2 MG/DL (ref 1.6–2.4)
MCH RBC QN AUTO: 26.2 PG (ref 26–33)
MCH RBC QN AUTO: 26.5 PG (ref 26–33)
MCH RBC QN AUTO: 26.6 PG (ref 26–33)
MCH RBC QN AUTO: 26.6 PG (ref 26–33)
MCH RBC QN AUTO: 26.7 PG (ref 26–33)
MCH RBC QN AUTO: 26.8 PG (ref 26–33)
MCHC RBC AUTO-ENTMCNC: 30.8 GM/DL (ref 32.2–35.5)
MCHC RBC AUTO-ENTMCNC: 30.9 GM/DL (ref 32.2–35.5)
MCHC RBC AUTO-ENTMCNC: 30.9 GM/DL (ref 32.2–35.5)
MCHC RBC AUTO-ENTMCNC: 31 GM/DL (ref 32.2–35.5)
MCHC RBC AUTO-ENTMCNC: 31.3 GM/DL (ref 32.2–35.5)
MCHC RBC AUTO-ENTMCNC: 31.4 GM/DL (ref 32.2–35.5)
MCV RBC AUTO: 84.8 FL (ref 81–99)
MCV RBC AUTO: 84.9 FL (ref 81–99)
MCV RBC AUTO: 85.4 FL (ref 81–99)
MCV RBC AUTO: 85.8 FL (ref 81–99)
MCV RBC AUTO: 86 FL (ref 81–99)
MCV RBC AUTO: 86.5 FL (ref 81–99)
MONOCYTES # BLD: 6.5 %
MONOCYTES ABSOLUTE: 1 THOU/MM3 (ref 0.4–1.3)
MYELOPEROXIDASE AB, IGG: 416 AU/ML (ref 0–19)
NUCLEATED RED BLOOD CELLS: 0 /100 WBC
PHOSPHORUS: 1.7 MG/DL (ref 2.4–4.7)
PHOSPHORUS: 1.8 MG/DL (ref 2.4–4.7)
PHOSPHORUS: 1.9 MG/DL (ref 2.4–4.7)
PHOSPHORUS: 2.1 MG/DL (ref 2.4–4.7)
PHOSPHORUS: 2.2 MG/DL (ref 2.4–4.7)
PHOSPHORUS: 2.5 MG/DL (ref 2.4–4.7)
PLATELET # BLD: 101 THOU/MM3 (ref 130–400)
PLATELET # BLD: 101 THOU/MM3 (ref 130–400)
PLATELET # BLD: 103 THOU/MM3 (ref 130–400)
PLATELET # BLD: 106 THOU/MM3 (ref 130–400)
PLATELET # BLD: 88 THOU/MM3 (ref 130–400)
PLATELET # BLD: 99 THOU/MM3 (ref 130–400)
PMV BLD AUTO: 10.3 FL (ref 9.4–12.4)
PMV BLD AUTO: 10.6 FL (ref 9.4–12.4)
PMV BLD AUTO: 9.4 FL (ref 9.4–12.4)
PMV BLD AUTO: 9.6 FL (ref 9.4–12.4)
PMV BLD AUTO: 9.7 FL (ref 9.4–12.4)
PMV BLD AUTO: 9.8 FL (ref 9.4–12.4)
POTASSIUM REFLEX MAGNESIUM: 4 MEQ/L (ref 3.5–5.2)
POTASSIUM REFLEX MAGNESIUM: 4 MEQ/L (ref 3.5–5.2)
POTASSIUM REFLEX MAGNESIUM: 4.1 MEQ/L (ref 3.5–5.2)
POTASSIUM REFLEX MAGNESIUM: 4.1 MEQ/L (ref 3.5–5.2)
POTASSIUM REFLEX MAGNESIUM: 4.2 MEQ/L (ref 3.5–5.2)
POTASSIUM REFLEX MAGNESIUM: 4.5 MEQ/L (ref 3.5–5.2)
POTASSIUM SERPL-SCNC: 4.1 MEQ/L (ref 3.5–5.2)
RBC # BLD: 2.79 MILL/MM3 (ref 4.2–5.4)
RBC # BLD: 2.8 MILL/MM3 (ref 4.2–5.4)
RBC # BLD: 2.88 MILL/MM3 (ref 4.2–5.4)
RBC # BLD: 2.89 MILL/MM3 (ref 4.2–5.4)
RBC # BLD: 3.09 MILL/MM3 (ref 4.2–5.4)
RBC # BLD: 3.12 MILL/MM3 (ref 4.2–5.4)
REASON FOR REJECTION: NORMAL
REJECTED TEST: NORMAL
SCAN OF BLOOD SMEAR: NORMAL
SEG NEUTROPHILS: 88.4 %
SEGMENTED NEUTROPHILS ABSOLUTE COUNT: 13.2 THOU/MM3 (ref 1.8–7.7)
SERINE PROTEASE 3, IGG: 1 AU/ML (ref 0–19)
SODIUM BLD-SCNC: 133 MEQ/L (ref 135–145)
SODIUM BLD-SCNC: 133 MEQ/L (ref 135–145)
SODIUM BLD-SCNC: 134 MEQ/L (ref 135–145)
SODIUM BLD-SCNC: 134 MEQ/L (ref 135–145)
SODIUM BLD-SCNC: 135 MEQ/L (ref 135–145)
SODIUM BLD-SCNC: 135 MEQ/L (ref 135–145)
TOTAL PROTEIN: 4.8 G/DL (ref 6.1–8)
TOTAL PROTEIN: 4.9 G/DL (ref 6.1–8)
VANCOMYCIN RANDOM: 12.6 UG/ML (ref 0.1–39.9)
WBC # BLD: 11.9 THOU/MM3 (ref 4.8–10.8)
WBC # BLD: 12.7 THOU/MM3 (ref 4.8–10.8)
WBC # BLD: 13.5 THOU/MM3 (ref 4.8–10.8)
WBC # BLD: 14.1 THOU/MM3 (ref 4.8–10.8)
WBC # BLD: 14.9 THOU/MM3 (ref 4.8–10.8)
WBC # BLD: 17.5 THOU/MM3 (ref 4.8–10.8)

## 2021-11-07 PROCEDURE — 99233 SBSQ HOSP IP/OBS HIGH 50: CPT | Performed by: INTERNAL MEDICINE

## 2021-11-07 PROCEDURE — 84100 ASSAY OF PHOSPHORUS: CPT

## 2021-11-07 PROCEDURE — 2580000003 HC RX 258: Performed by: INTERNAL MEDICINE

## 2021-11-07 PROCEDURE — 82947 ASSAY GLUCOSE BLOOD QUANT: CPT

## 2021-11-07 PROCEDURE — 2700000000 HC OXYGEN THERAPY PER DAY

## 2021-11-07 PROCEDURE — 2580000003 HC RX 258: Performed by: STUDENT IN AN ORGANIZED HEALTH CARE EDUCATION/TRAINING PROGRAM

## 2021-11-07 PROCEDURE — 82330 ASSAY OF CALCIUM: CPT

## 2021-11-07 PROCEDURE — 37799 UNLISTED PX VASCULAR SURGERY: CPT

## 2021-11-07 PROCEDURE — 94761 N-INVAS EAR/PLS OXIMETRY MLT: CPT

## 2021-11-07 PROCEDURE — 36415 COLL VENOUS BLD VENIPUNCTURE: CPT

## 2021-11-07 PROCEDURE — 51700 IRRIGATION OF BLADDER: CPT

## 2021-11-07 PROCEDURE — 94003 VENT MGMT INPAT SUBQ DAY: CPT

## 2021-11-07 PROCEDURE — 83735 ASSAY OF MAGNESIUM: CPT

## 2021-11-07 PROCEDURE — 2000000000 HC ICU R&B

## 2021-11-07 PROCEDURE — 2500000003 HC RX 250 WO HCPCS: Performed by: NURSE PRACTITIONER

## 2021-11-07 PROCEDURE — 6360000002 HC RX W HCPCS: Performed by: STUDENT IN AN ORGANIZED HEALTH CARE EDUCATION/TRAINING PROGRAM

## 2021-11-07 PROCEDURE — 85025 COMPLETE CBC W/AUTO DIFF WBC: CPT

## 2021-11-07 PROCEDURE — 2500000003 HC RX 250 WO HCPCS: Performed by: STUDENT IN AN ORGANIZED HEALTH CARE EDUCATION/TRAINING PROGRAM

## 2021-11-07 PROCEDURE — 2580000003 HC RX 258: Performed by: EMERGENCY MEDICINE

## 2021-11-07 PROCEDURE — 85027 COMPLETE CBC AUTOMATED: CPT

## 2021-11-07 PROCEDURE — 6360000002 HC RX W HCPCS: Performed by: FAMILY MEDICINE

## 2021-11-07 PROCEDURE — 71045 X-RAY EXAM CHEST 1 VIEW: CPT

## 2021-11-07 PROCEDURE — 99291 CRITICAL CARE FIRST HOUR: CPT | Performed by: SURGERY

## 2021-11-07 PROCEDURE — 94640 AIRWAY INHALATION TREATMENT: CPT

## 2021-11-07 PROCEDURE — 99232 SBSQ HOSP IP/OBS MODERATE 35: CPT | Performed by: UROLOGY

## 2021-11-07 PROCEDURE — 80053 COMPREHEN METABOLIC PANEL: CPT

## 2021-11-07 PROCEDURE — 6370000000 HC RX 637 (ALT 250 FOR IP): Performed by: FAMILY MEDICINE

## 2021-11-07 PROCEDURE — 85520 HEPARIN ASSAY: CPT

## 2021-11-07 PROCEDURE — 80202 ASSAY OF VANCOMYCIN: CPT

## 2021-11-07 PROCEDURE — 36620 INSERTION CATHETER ARTERY: CPT | Performed by: FAMILY MEDICINE

## 2021-11-07 PROCEDURE — 6360000002 HC RX W HCPCS: Performed by: NURSE PRACTITIONER

## 2021-11-07 PROCEDURE — C9113 INJ PANTOPRAZOLE SODIUM, VIA: HCPCS | Performed by: NURSE PRACTITIONER

## 2021-11-07 PROCEDURE — 85018 HEMOGLOBIN: CPT

## 2021-11-07 PROCEDURE — 85014 HEMATOCRIT: CPT

## 2021-11-07 PROCEDURE — 6360000002 HC RX W HCPCS: Performed by: EMERGENCY MEDICINE

## 2021-11-07 PROCEDURE — 2500000003 HC RX 250 WO HCPCS: Performed by: INTERNAL MEDICINE

## 2021-11-07 PROCEDURE — 2580000003 HC RX 258: Performed by: NURSE PRACTITIONER

## 2021-11-07 PROCEDURE — 36592 COLLECT BLOOD FROM PICC: CPT

## 2021-11-07 PROCEDURE — 36620 INSERTION CATHETER ARTERY: CPT

## 2021-11-07 PROCEDURE — 6360000002 HC RX W HCPCS: Performed by: INTERNAL MEDICINE

## 2021-11-07 PROCEDURE — 6370000000 HC RX 637 (ALT 250 FOR IP): Performed by: STUDENT IN AN ORGANIZED HEALTH CARE EDUCATION/TRAINING PROGRAM

## 2021-11-07 RX ORDER — SODIUM CHLORIDE FOR INHALATION 3 %
4 VIAL, NEBULIZER (ML) INHALATION 2 TIMES DAILY
Status: DISCONTINUED | OUTPATIENT
Start: 2021-11-07 | End: 2021-11-11

## 2021-11-07 RX ADMIN — Medication: at 03:09

## 2021-11-07 RX ADMIN — Medication: at 07:29

## 2021-11-07 RX ADMIN — SODIUM CHLORIDE, PRESERVATIVE FREE 10 ML: 5 INJECTION INTRAVENOUS at 08:24

## 2021-11-07 RX ADMIN — ALBUTEROL SULFATE 2.5 MG: 2.5 SOLUTION RESPIRATORY (INHALATION) at 12:28

## 2021-11-07 RX ADMIN — Medication: at 00:46

## 2021-11-07 RX ADMIN — CEFEPIME HYDROCHLORIDE 2000 MG: 2 INJECTION, POWDER, FOR SOLUTION INTRAVENOUS at 21:45

## 2021-11-07 RX ADMIN — SODIUM CHLORIDE 0.2 MCG/KG/HR: 9 INJECTION, SOLUTION INTRAVENOUS at 08:26

## 2021-11-07 RX ADMIN — TIOTROPIUM BROMIDE AND OLODATEROL 2 PUFF: 3.124; 2.736 SPRAY, METERED RESPIRATORY (INHALATION) at 06:19

## 2021-11-07 RX ADMIN — CEFEPIME HYDROCHLORIDE 2000 MG: 2 INJECTION, POWDER, FOR SOLUTION INTRAVENOUS at 13:08

## 2021-11-07 RX ADMIN — HEPARIN SODIUM: 1000 INJECTION, SOLUTION INTRAVENOUS; SUBCUTANEOUS at 20:12

## 2021-11-07 RX ADMIN — Medication: at 19:34

## 2021-11-07 RX ADMIN — SODIUM CHLORIDE SOLN NEBU 3% 4 ML: 3 NEBU SOLN at 17:52

## 2021-11-07 RX ADMIN — PHENYLEPHRINE HYDROCHLORIDE 130 MCG/MIN: 10 INJECTION INTRAVENOUS at 03:06

## 2021-11-07 RX ADMIN — Medication: at 18:18

## 2021-11-07 RX ADMIN — POLYETHYLENE GLYCOL 3350 17 G: 17 POWDER, FOR SOLUTION ORAL at 08:24

## 2021-11-07 RX ADMIN — Medication: at 06:25

## 2021-11-07 RX ADMIN — HEPARIN SODIUM: 1000 INJECTION, SOLUTION INTRAVENOUS; SUBCUTANEOUS at 02:07

## 2021-11-07 RX ADMIN — Medication: at 00:44

## 2021-11-07 RX ADMIN — Medication: at 19:33

## 2021-11-07 RX ADMIN — ALBUTEROL SULFATE 2.5 MG: 2.5 SOLUTION RESPIRATORY (INHALATION) at 00:59

## 2021-11-07 RX ADMIN — VASOPRESSIN 0.04 UNITS/MIN: 20 INJECTION INTRAVENOUS at 14:53

## 2021-11-07 RX ADMIN — SODIUM CHLORIDE 0.4 MCG/KG/HR: 9 INJECTION, SOLUTION INTRAVENOUS at 19:49

## 2021-11-07 RX ADMIN — SODIUM HYPOCHLORITE: 1.25 SOLUTION TOPICAL at 08:32

## 2021-11-07 RX ADMIN — Medication: at 03:06

## 2021-11-07 RX ADMIN — VASOPRESSIN 0.04 UNITS/MIN: 20 INJECTION INTRAVENOUS at 04:22

## 2021-11-07 RX ADMIN — ALBUTEROL SULFATE 2.5 MG: 2.5 SOLUTION RESPIRATORY (INHALATION) at 06:26

## 2021-11-07 RX ADMIN — POTASSIUM PHOSPHATE, MONOBASIC AND POTASSIUM PHOSPHATE, DIBASIC 12 MMOL: 224; 236 INJECTION, SOLUTION, CONCENTRATE INTRAVENOUS at 22:56

## 2021-11-07 RX ADMIN — Medication: at 09:59

## 2021-11-07 RX ADMIN — Medication: at 13:44

## 2021-11-07 RX ADMIN — SENNOSIDES 8.6 MG: 8.6 TABLET, COATED ORAL at 08:24

## 2021-11-07 RX ADMIN — ALLOPURINOL 300 MG: 300 TABLET ORAL at 08:24

## 2021-11-07 RX ADMIN — ASPIRIN 81 MG CHEWABLE TABLET 81 MG: 81 TABLET CHEWABLE at 08:24

## 2021-11-07 RX ADMIN — Medication: at 09:58

## 2021-11-07 RX ADMIN — PANTOPRAZOLE SODIUM 40 MG: 40 INJECTION, POWDER, FOR SOLUTION INTRAVENOUS at 08:24

## 2021-11-07 RX ADMIN — FENTANYL CITRATE 75 MCG/HR: 50 INJECTION INTRAMUSCULAR; INTRAVENOUS at 05:05

## 2021-11-07 RX ADMIN — ALBUTEROL SULFATE 2.5 MG: 2.5 SOLUTION RESPIRATORY (INHALATION) at 17:52

## 2021-11-07 RX ADMIN — POTASSIUM PHOSPHATE, MONOBASIC AND POTASSIUM PHOSPHATE, DIBASIC 12 MMOL: 224; 236 INJECTION, SOLUTION, CONCENTRATE INTRAVENOUS at 08:30

## 2021-11-07 RX ADMIN — SODIUM CHLORIDE, PRESERVATIVE FREE 10 ML: 5 INJECTION INTRAVENOUS at 21:14

## 2021-11-07 RX ADMIN — VANCOMYCIN HYDROCHLORIDE 1500 MG: 5 INJECTION, POWDER, LYOPHILIZED, FOR SOLUTION INTRAVENOUS at 16:12

## 2021-11-07 RX ADMIN — VASOPRESSIN 0.04 UNITS/MIN: 20 INJECTION INTRAVENOUS at 22:56

## 2021-11-07 ASSESSMENT — PULMONARY FUNCTION TESTS
PIF_VALUE: 25
PIF_VALUE: 24
PIF_VALUE: 27
PIF_VALUE: 24
PIF_VALUE: 27
PIF_VALUE: 24

## 2021-11-07 ASSESSMENT — PAIN SCALES - GENERAL: PAINLEVEL_OUTOF10: 0

## 2021-11-07 NOTE — PROGRESS NOTES
124*   CALCIUM 8.2*  --  8.2* 8.1*   MG 2.2  --  2.2 2.0   PHOS 2.2*  --  1.9* 1.8*    < > = values in this interval not displayed.      Hepatic:   Recent Labs     11/07/21  0012 11/07/21  0420 11/07/21  0816   LABALBU 2.5* 2.4* 2.4*   AST 15 16 14   ALT 12 13 13   BILITOT 0.3 0.4 0.3   ALKPHOS 126 132* 125         Meds:  Infusion:    dexmedetomidine 0.4 mcg/kg/hr (11/07/21 1011)    heparin 5000 units CRRT syringe 1 mL/hr at 11/06/21 1118    sodium chloride      bumetanide 0.1 mg/mL infusion 0.5 mg/hr (11/05/21 0904)    prismaSATE BGK 4/2.5 1,500 mL/hr at 11/07/21 0958    prismaSol BGK 4/2.5 1,500 mL/hr at 11/07/21 0959    prismaSol BGK 4/2.5 500 mL/hr at 11/07/21 9864    sodium chloride      sodium chloride      vasopressin (Septic Shock) infusion 0.04 Units/min (11/07/21 0422)    phenylephrine (KRISTAL-SYNEPHRINE) 250 mg/250 mL infusion 50 mcg/min (11/07/21 1010)    sodium chloride      sodium chloride 25 mL (10/30/21 0129)    [Held by provider] sodium chloride 50 mL/hr at 10/28/21 2314     Meds:    sodium chloride (Inhalant)  4 mL Nebulization BID    potassium phosphate IVPB  12 mmol IntraVENous Once    cefepime  2,000 mg IntraVENous Q12H    tiotropium-olodaterol  2 puff Inhalation Daily    aspirin  81 mg Oral Daily    vancomycin (VANCOCIN) intermittent dosing (placeholder)   Other RX Placeholder    pantoprazole  40 mg IntraVENous QAM    sodium hypochlorite   Irrigation Daily    Riociguat  2.5 mg Oral Q8H    sodium chloride flush  5-40 mL IntraVENous 2 times per day    allopurinol  300 mg Oral Daily    [Held by provider] FLUoxetine  40 mg Oral Daily    [Held by provider] metoprolol tartrate  12.5 mg Oral BID    albuterol  2.5 mg Nebulization Q6H     Meds prn: sodium chloride, potassium chloride, magnesium sulfate, sodium phosphate IVPB **OR** sodium phosphate IVPB **OR** sodium phosphate IVPB **OR** sodium phosphate IVPB, sodium chloride, sodium chloride, polyethylene glycol, senna, sodium chloride, acetaminophen, sodium chloride, sodium chloride flush       Impression and Plan:  1. ALVIN secondary to hypotension/sepsis and ischemic ATN  Currently on CVVH  Input and output reviewed  Electrolytes are stable  Okay to reduce solute clearance specially given ongoing hypophosphatemia  CVVH data reviewed  On IV heparin prefilter  2. Volume overload. Removing net 100 mL/h ultrafiltration  3. Acute hypoxic respiratory failure secondary to severe pneumonia  4. Septic shock  5. Hydronephrosis secondary to staghorn calculi. Patient is status post percutaneous drainage  6. Hyponatremia improving  7.   Pleural effusions    D/W ICU RN and family    Robe Galeana MD  [facility]

## 2021-11-07 NOTE — PROCEDURES
Procedure: ARTERIAL LINE (A-Line) PLACEMENT    Date: 11/07/21  Indication: Hemodynamic monitoring    Risks and benefits to the procedure were discussed: Yes    Consent obtained: Yes    A time-out was completed verifying correct patient, procedure, site, positioning and special equipment, if applicable. Davids test was performed to ensure adequate perfusion. The patients left wrist was prepped and draped in sterile fashion. 1% Lidocaine was used to anesthetize the area. A 20G arterial line was introduced into the radial artery under US guidance. The catheter was threaded over the guide wire and the needle was removed with appropriate pulsatile blood return. The catheter was then sutured in place to the skin and a sterile dressing was applied. Perfusion to the extremity distal to the point of catheter insertion was checked and found to be adequate.     <Dr. Wili Velazco and Dr. Thierno Swan were present for the entire procedure. Estimated Blood Loss: < 5 cc    The patient tolerated the procedure well, with no immediate complications.     Procedure performed by: Mary Tanner MD    11/07/21

## 2021-11-07 NOTE — PROGRESS NOTES
CRITICAL CARE PROGRESS NOTE      Patient:  Sandra Wilson    Unit/Bed:4D-09/009-A  YOB: 1950  MRN: [id number]   PCP: Minerva Mcleod MD  Date of Admission: 10/27/2021  Chief Complaint:- Shortness of breath    Assessment and Plan:    1. Acute combined hypercapnic and hypoxic respiratory failure: Secondary to severe pneumonia with left-sided pleural effusion. Currently on pressure control ventilation with Pressure control 14 PEEP 6 FiO2 30%. Failed SBT 11/4/21. Will wean settings and attempt SBT in 11/8/21 Switched to Precedex to facilitate weaning. Maintain lung protection strategies with peak pressure less than 35 cmH2O  2. Severe bilateral multiorganism pneumonia: PCR positive Staph w/ MECA gene and adenovirus. Culture growing MSSA. Day 11/14 Vancomycin (started 10/28/21) and completed 5 days Rocephin. 3. Septic Shock: 2/2 severe PNA. CI 6.3 NE d/c 2/2 worsening afib RVR. NE d/c on 11/3/21 2/2 worsening afib/RVR. .Currently on pressor support w/ phenylephrine. Nephrostomy and repeat cultures 11/5 pending on cefipmie emperically   4. Stage II ALVIN on CKD 3: Suspect post renal etiology 2/2 staghorn calculi. Nephrology following. CRRT initiated 11/5/21 . Of note patient does have baseline CKD positive RYLAN screen suggests underlying autoimmune etiology. Mitochondrial Abs, Anti-DS-DNA, Anti histone, Anti Banks, Anti-Savana, ANCA pending. GBM antibodies negative, C3/C4 levels normal, elevated kappa/lambda free light chains with normal ratio. No plans per Bx per nephrology. Fluid collection noted on left renal unlikely abscess as patient remains afebrile. .  5. Suspected Pyelonephritis: perinephric stranding noted on CT abdomen and pelvis w/ air fluid region discontiguous with region of nephrostomy tube insertion concerning for abscess (<3 cm) vs emphysematous pyelonephritis when discussed with radiologist. Culture of nephrostomy tube aspirate 11/7/21 pending.  Patient is on day 3/14 on empiric cefepime (started 115/21)  6. Hydronephrosis 2/2 Left-sided staghorn calculi: Urology following. Percutaneous drainage tube 11/2/21 IR. Low output following tube placement. Increased flow on CBI per urology recs. Still flushing clots  7. Hematuria: s/p perc tube placement. CBI per above. Holding Heparin Given 1 unit PRBC  8. Hyponatremia: Suspect 2/2 renal failure. Unable to obtain urine Na 2/2 bladder irrigation. Bumex on 11/5- 1014 urine out over 24 hours. 9. Bilateral Pleural Effusion:  2/2 PNA per above with associated volume overload in context of renal failure. Interval enlargement noted on CT 11/1/21. Improvemed in concomitant pericardial effusion  10. Suspected COPD: current every day smoker. Obstructive process noted on flow volume loop on ventilator. Started empiric therapy with Stiolto. Recommend formal PFT once improved  11. PAH/chronic RV failure NYHA II: EF 55 to 60% G2 DD TTE 5/4/2021. RHC present. Treated w/ Riociguat. 12. MERCEDES: hematuria s/p perc tube placement , given one unit PRBC overnight  13. Cholelithiasis: w/ dilated CBD. Noted on 10/30/2021 US liver/GB. GI following. Does not suspect choledocolithiasis at this time. May consider HIDA scan once stable   14. Stage III decubitus ulcer with inferior tunneling: S/p excision VAC placement 8/21. Low suspicion for active infection  15. Moderate malnutrition: Pre-albumin 14.4 w/ notable cachexia  16. Chronic tobacco use:   cessation  17. Mild AS: Noted on previous TTE not appreciated on repeat imaging  18. Gout:  W/o exacerbation. Continue allopurinol  19. BARBER: non compliant w/ CPAP  20. New onset atrial fibrillation with rapid ventricular response: now in NSR s/p DCCV 11/3/21 Stopped Heparin per hematuria & and now in NSR. Stopped Amiodarone 11/8/21. Holding metoprolol 2/2 hypotension  21. Hyperkalemia (resolved): 2/2 ALVIN  22. Nutrition: on TF @30 ml       INITIAL H AND P AND ICU COURSE:  71F admitted to [facility] 10/27/21 w/ SOB.  Current smoker w/ PMH PAH grp 3, HFpEF, stage III sacral ulcer s/p wound ostomy 9/21. Patient sister reports SpO2 51% at home and was brought to ED where ABG showed pH 7.19, PCO2 80, PO2 134. She required emergent intubation and was transferred to ICU. Workup revealed left sided pleural effusion and underwent US guided thoracentesis w/ 1100 cc removed consistent w/ MRSA/adenovirus. Patient was noted to be in afib/RVR and was placed on amio, heparin drip. Patient was also noted to have normocytic anemia and received 1 unit PRBC 10/31/21. CT abdomen revealed CBD dilation w/ cholelithiasis. 11/1/2021: Plan for nephrostomy tubes today. Hemoglobin 7.7 s/p 1 unit PRBC yesterday. Weaned to 4 mics Levophed. 11/2/2021: Patient resumed back on prior dose of vancomycin. Low urine output w/ increasing pressor requirements today    11/3/2021: Will attempt SBT if able to wean today. Increased Amio drip 2/2 worsening tachycardia. Continued hematuria w/ low urine output. Cr stable. Will evaluate UTI following perc tube placement. Rise in WBC noted. Culture pending    11/4/2021: Cardioversion on 11/3/2021. Now and NSR. CVP 29 this a.m. Suspect drop in CI 2/2 to stopping levophed. Diffuse edema following transfusion overnight. Given one-time dose of 2 mg Bumex and albumin. SBT this AM    11/5/2021: Failed SBT yesterday. Continued low urine output. Trial of Bumex today. Will proceed with dialysis if fails to improve. 11/6/2021: Started on CRRT, continues to have bloody drainage of nephrostomy tube given 1 unit PRBC, cefipime emperically pending cultures of nephrostomy tube fluid and repeat resp cultures. 11/7/21: Patient remains clinically well on exam.  We'll continue medical of UF with CRRT. On empiric cefepime for coverage of perinephric abscess noted on CT abdomen and pelvis November 4, 2021. White count trending down. Will attempt spontaneous breathing trial in a.m. Past Medical History:  Per HPI.   Family History:  DM in father and sister. Social History: chronic smoker. ROS   Unable to obtain a comprehensive ROS as patient sedated and intubated     Scheduled Meds:   cefepime  2,000 mg IntraVENous Q12H    tiotropium-olodaterol  2 puff Inhalation Daily    aspirin  81 mg Oral Daily    vancomycin (VANCOCIN) intermittent dosing (placeholder)   Other RX Placeholder    pantoprazole  40 mg IntraVENous QAM    sodium hypochlorite   Irrigation Daily    Riociguat  2.5 mg Oral Q8H    sodium chloride flush  5-40 mL IntraVENous 2 times per day    allopurinol  300 mg Oral Daily    [Held by provider] FLUoxetine  40 mg Oral Daily    [Held by provider] metoprolol tartrate  12.5 mg Oral BID    albuterol  2.5 mg Nebulization Q6H     Continuous Infusions:   heparin 5000 units CRRT syringe 1 mL/hr at 11/06/21 1118    sodium chloride      bumetanide 0.1 mg/mL infusion 0.5 mg/hr (11/05/21 0904)    prismaSATE BGK 4/2.5 1,500 mL/hr at 11/07/21 0309    prismaSol BGK 4/2.5 1,500 mL/hr at 11/07/21 0306    prismaSol BGK 4/2.5 500 mL/hr at 11/06/21 2212    sodium chloride      sodium chloride      vasopressin (Septic Shock) infusion 0.04 Units/min (11/07/21 0422)    phenylephrine (KRISTAL-SYNEPHRINE) 250 mg/250 mL infusion 70 mcg/min (11/07/21 0552)    sodium chloride      fentaNYL (SUBLIMAZE) 1250 mcg in sodium chloride 0.9 % 250 mL 75 mcg/hr (11/07/21 0505)    sodium chloride 25 mL (10/30/21 0129)    amiodarone Stopped (11/07/21 0616)    [Held by provider] heparin (PORCINE) Infusion Stopped (11/04/21 0134)    [Held by provider] sodium chloride 50 mL/hr at 10/28/21 2314    midazolam Stopped (11/04/21 0830)       PHYSICAL EXAMINATION:  T:  98.6. P: 68 RR:  13. B/P:  115/50  FiO2:  55. O2 Sat:  92%. I/O: 8919/5435 (irrigation)  Body mass index is 43.84 kg/m². GCS:   8  PC: 14 PEEP: 8: TV: 400: RRTotal: 11: General: Acute on chronically ill-appearing elderly white female  HEENT:  normocephalic and atraumatic. No scleral icterus. PERR  Neck: supple. No Thyromegaly. Left subclavian dialysis catheter   Lungs: Wheezes heard right apex. Diffuse rhonchi appreciated. No retractions  Cardiac: RRR. No JVD. Abdomen: soft. Nontender. ,npehrostomy tube with bloody drainage  Extremities:  +1 pitting edema x4. noted No clubbing, cyanosis   Vasculature: capillary refill < 3 seconds. Palpable dorsalis pedis pulses. Skin:  warm and dry. Psych: Unable to assess as patient is currently sedated and on mechanical ventilation  Lymph:  No supraclavicular adenopathy. Neurologic:  No focal deficit. No seizures. Data: (All radiographs, tracings, PFTs, and imaging are personally viewed and interpreted unless otherwise noted).  CMP : Sodium 135 potassium 4.1 chloride 102 bicarb 24 BUN/creatinine 15/0.8 anion gap 9 ionized calcium 1.29 GFR 71 magnesium 2.2 glucose 132 calcium 8.2 phosphorus 1.9 total protein 4.9 Albumin 2.4 alk phos 132 ALT/AST 13/16 bilirubin 0.4   CBC : WBC 14.9 down from 20.8 H&H 8.1/26.2 platelets 727    SARS-CoV-2 NAAT negative on 10/27/2021   Telemetry shows NSR   Chest x-ray 10/31/2021 demonstrated diffuse bilateral patchy opacities with left-sided pleural effusion and cardiomegaly   Respiratory culture  11/5/21 pending   Pneumonia panel collected 10/27/2021 demonstrates staph aureus. MEC-A gene and adenovirus   CT chest on 11/1/2021 demonstrates bilateral pleural effusions with interval enlargement   CXR 11/3/2021 demonstrates worsening pulmonary congestion with bilateral effusions   UA shows bacteria w/ moderate white cells   CT Abdomen and pelvis 11/5/21 showed perinephric stranding with left-sided hypodense nodule with air-fluid level concerning for abscess. Diffuse ascites and anasarca noted.    CXR November 7, 2021 shows bilateral pulmonary edema with worsening right-sided infiltrate         Seen with multidisciplinary ICU team.  Meets Continued ICU Level Care Criteria:    [x] Yes   [] No - Transfer Planned to listed location:  [] HOSPITALIST CONTACTED-      Patient was seen and evaluated the resident. CC time 35 minutes. Time was discontiguous. Time does not include procedures. Time does include my direct assessment of the patient and coordination of care. Responding well to CRRT, pressor requirements decreasing. Amnio discontinued heparin drip discontinued now in sinus rhythm.   Electronically signed by Jolie Maria DO PGY-2

## 2021-11-07 NOTE — PROGRESS NOTES
Pharmacy Vancomycin Consult     Vancomycin Day: 11  Current Dosing: Intermittent  Current indication: Pneumonia    Temp max:  99.2    Recent Labs     11/07/21  0816 11/07/21  1225   BUN 14 12   CREATININE 0.7 0.7   WBC 14.1* 13.5*       Intake/Output Summary (Last 24 hours) at 11/7/2021 1522  Last data filed at 11/7/2021 1500  Gross per 24 hour   Intake 2711.47 ml   Output 5206 ml   Net -2494.53 ml     Cultures  Date Source Results   10/27/2021 Blood x 2 No growth - prelim   10/27/2021 Pneumonia PCR MRSA & Adenovirus   10/27/2021 Pleural fluid No organisms observed   10/27/2021 Urine No growth   10/28/2021 Respiratory culture MSSA   11/5/21 ET tube CoNS. 11/5/21 Nephrostomy tube Coag positive staph       Ht Readings from Last 1 Encounters:   10/28/21 4' 9\" (1.448 m)        Wt Readings from Last 1 Encounters:   11/07/21 202 lb 9.6 oz (91.9 kg)       Body mass index is 43.84 kg/m². CrCl cannot be calculated (Unknown ideal weight.). CVVHD-F    Random: 12.6 mcg/mL drawn ~24 hours after last dose    Assessment/Plan:  Patient continues on CVVHD-F. Level is near goal of 15 mcg/mL for pneumonia. Will continue Vancomycin 1500 mg (~15 mg/kg) IVPB Q24H while patient is on CVVHD-F per protocol. Recommend another level in 1-2 days (not ordered yet).     Pharmacy will continue to monitor    Tamika Quintero PharmD, BCPS  11/7/2021  3:27 PM

## 2021-11-07 NOTE — PROGRESS NOTES
[address] 4[address]  [address] [facility] [address]  Dept: [phone]  Loc: [phone]  Visit Date: 10/27/2021  Urology Progress Note    Chief Complaint: sob  Reason for Urology  Consult: kidney stone    Subjective:   Patient is resting in bed, intubated. Eyes open. Family at bedside. CBI running at slow rate, urine clear in tubing, clear in bag. Left nephrostomy tube with bloody drainage in bag, low ouput  On CRRT   Heparin drip restarted  HH 7.7 today     Urine cx from  and 10/27 are negative. Ux sent from neph    Possible SPT today  Reviewed CT results with Dr Ghada Ramos. Watch  closely. No intervention needed at this time. Will continue to follow.     Vitals:  BP (!) 105/50   Pulse 74   Temp 99.2 °F (37.3 °C) (Oral)   Resp 14   Ht 4' 9\" (1.448 m)   Wt 202 lb 9.6 oz (91.9 kg)   SpO2 93%   BMI 43.84 kg/m²   Temp  Av.5 °F (36.9 °C)  Min: 97.6 °F (36.4 °C)  Max: 99.2 °F (37.3 °C)    Intake/Output Summary (Last 24 hours) at 2021 0940  Last data filed at 2021 0900  Gross per 24 hour   Intake 2972.4 ml   Output 5392 ml   Net -2419.6 ml       Social History     Socioeconomic History    Marital status: Single     Spouse name: Not on file    Number of children: 0    Years of education: Not on file    Highest education level: Not on file   Occupational History    Not on file   Tobacco Use    Smoking status: Never Smoker    Smokeless tobacco: Never Used   Vaping Use    Vaping Use: Never used   Substance and Sexual Activity    Alcohol use: No    Drug use: No    Sexual activity: Not Currently   Other Topics Concern    Not on file   Social History Narrative    Not on file     Social Determinants of Health     Financial Resource Strain: Low Risk     Difficulty of Paying Living Expenses: Not very hard   Food Insecurity: No Food Insecurity    Worried About Running Out of Food in the Last Year: Never true    Traci of Food in the Last Year: Never true   Transportation Needs:     Lack of Transportation (Medical): Not on file    Lack of Transportation (Non-Medical): Not on file   Physical Activity:     Days of Exercise per Week: Not on file    Minutes of Exercise per Session: Not on file   Stress:     Feeling of Stress : Not on file   Social Connections:     Frequency of Communication with Friends and Family: Not on file    Frequency of Social Gatherings with Friends and Family: Not on file    Attends [employer] Services: Not on file    Active Member of Clubs or Organizations: Not on file    Attends Club or Organization Meetings: Not on file    Marital Status: Not on file   Intimate Partner Violence:     Fear of Current or Ex-Partner: Not on file    Emotionally Abused: Not on file    Physically Abused: Not on file    Sexually Abused: Not on file   Housing Stability:     Unable to Pay for Housing in the Last Year: Not on file    Number of Places Lived in the Last Year: Not on file    Unstable Housing in the Last Year: Not on file     Family History   Problem Relation Age of Onset    Diabetes Father     Arthritis Mother     COPD Mother     Diabetes Sister     Heart Disease Maternal Uncle     Breast Cancer Niece 36    Sleep Apnea Brother     Asthma Neg Hx     Birth Defects Neg Hx     Cancer Neg Hx     Depression Neg Hx     Early Death Neg Hx     Hearing Loss Neg Hx     High Blood Pressure Neg Hx     High Cholesterol Neg Hx     Kidney Disease Neg Hx     Learning Disabilities Neg Hx     Mental Illness Neg Hx     Mental Retardation Neg Hx     Miscarriages / Stillbirths Neg Hx     Stroke Neg Hx     Substance Abuse Neg Hx     Vision Loss Neg Hx     Other Neg Hx      No Known Allergies    Objective:    Constitutional: intubated, opens eyes to name   HEENT:   Head:         Normocephalic and atraumatic. Mucous membranes are normal.   Eyes:         EOM are normal. No scleral icterus.   Nose:    The external appearance of the nose is normal  Ears: The ears appear normal to external inspection. Cardiovascular:       Normal rate, regular rhythm. Abdominal:          Soft. No tenderness. Active bowel sounds. Genitalia:    Padilla catheter draining clear urine, no clots. CBI at moderate rate. Extremities:    + generalized edema    Labs:  WBC:    Lab Results   Component Value Date    WBC 14.1 11/07/2021     Hemoglobin/Hematocrit:    Lab Results   Component Value Date    HGB 7.7 11/07/2021    HCT 24.8 11/07/2021     BMP:    Lab Results   Component Value Date     11/07/2021    K 4.0 11/07/2021    K 4.1 11/07/2021     11/07/2021    CO2 24 11/07/2021    BUN 15 11/07/2021    LABALBU 2.4 11/07/2021    CREATININE 0.8 11/07/2021    CALCIUM 8.2 11/07/2021    LABGLOM 71 11/07/2021     Ct shows:    1. Interval percutaneous left nephrostomy tube placement with small amount of blood in the renal pelvis about the nephrostomy tube. 2. Hypodense lesion in the anterior aspect of the interpolar region of the left kidney with small focus of air at the margin. Developing abscess can't be excluded. Impression/Plan:    Left staghorn stone- s/p left nephrostomy tube placement 11/1/2021 in IR. Will need treated at a later time. Discussed with family  Respiratory failure secondary to pneumonia- intubated  Afib- cardioversion 11/3  Gross hematuria- clear in tube, clear in bag Heparin drip restarted   ALVIN- improved, on CRRT. nephrology on board. Acute blood loss anemia- transfused with 2 units of PRBC 11/4 Monitor HH    Reviewed CT results with Dr Ghada Ramos. Watch HH closely. No intervention needed at this time. Will continue to follow.       LANE Tony CNP, LANE  11/07/21 9:40 AM  Urology    Electronically signed by LANE Tony CNP on 11/7/2021 at 9:40 AM

## 2021-11-08 LAB
ABO: NORMAL
ABSOLUTE RETIC #: 89 THOU/MM3 (ref 20–115)
ALBUMIN SERPL-MCNC: 2.1 G/DL (ref 3.5–5.1)
ALBUMIN SERPL-MCNC: 2.1 G/DL (ref 3.5–5.1)
ALBUMIN SERPL-MCNC: 2.2 G/DL (ref 3.5–5.1)
ALBUMIN SERPL-MCNC: 2.4 G/DL (ref 3.5–5.1)
ALBUMIN SERPL-MCNC: 2.4 G/DL (ref 3.5–5.1)
ALP BLD-CCNC: 110 U/L (ref 38–126)
ALP BLD-CCNC: 115 U/L (ref 38–126)
ALP BLD-CCNC: 117 U/L (ref 38–126)
ALP BLD-CCNC: 118 U/L (ref 38–126)
ALP BLD-CCNC: 126 U/L (ref 38–126)
ALT SERPL-CCNC: 11 U/L (ref 11–66)
ALT SERPL-CCNC: 12 U/L (ref 11–66)
ALT SERPL-CCNC: 12 U/L (ref 11–66)
ALT SERPL-CCNC: 13 U/L (ref 11–66)
ALT SERPL-CCNC: 13 U/L (ref 11–66)
ANION GAP SERPL CALCULATED.3IONS-SCNC: 7 MEQ/L (ref 8–16)
ANION GAP SERPL CALCULATED.3IONS-SCNC: 7 MEQ/L (ref 8–16)
ANION GAP SERPL CALCULATED.3IONS-SCNC: 8 MEQ/L (ref 8–16)
ANION GAP SERPL CALCULATED.3IONS-SCNC: 9 MEQ/L (ref 8–16)
ANION GAP SERPL CALCULATED.3IONS-SCNC: 9 MEQ/L (ref 8–16)
ANTIBODY SCREEN: NORMAL
AST SERPL-CCNC: 14 U/L (ref 5–40)
AST SERPL-CCNC: 17 U/L (ref 5–40)
BASOPHILS # BLD: 0.1 %
BASOPHILS ABSOLUTE: 0 THOU/MM3 (ref 0–0.1)
BILIRUB SERPL-MCNC: 0.5 MG/DL (ref 0.3–1.2)
BILIRUB SERPL-MCNC: 0.5 MG/DL (ref 0.3–1.2)
BILIRUB SERPL-MCNC: 0.6 MG/DL (ref 0.3–1.2)
BILIRUB SERPL-MCNC: 0.6 MG/DL (ref 0.3–1.2)
BILIRUB SERPL-MCNC: 1 MG/DL (ref 0.3–1.2)
BUN BLDV-MCNC: 12 MG/DL (ref 7–22)
BUN BLDV-MCNC: 15 MG/DL (ref 7–22)
CALCIUM IONIZED: 1.25 MMOL/L (ref 1.12–1.32)
CALCIUM IONIZED: 1.27 MMOL/L (ref 1.12–1.32)
CALCIUM SERPL-MCNC: 7.8 MG/DL (ref 8.5–10.5)
CALCIUM SERPL-MCNC: 7.9 MG/DL (ref 8.5–10.5)
CALCIUM SERPL-MCNC: 7.9 MG/DL (ref 8.5–10.5)
CALCIUM SERPL-MCNC: 8.1 MG/DL (ref 8.5–10.5)
CALCIUM SERPL-MCNC: 8.5 MG/DL (ref 8.5–10.5)
CHLORIDE BLD-SCNC: 100 MEQ/L (ref 98–111)
CHLORIDE BLD-SCNC: 100 MEQ/L (ref 98–111)
CHLORIDE BLD-SCNC: 101 MEQ/L (ref 98–111)
CHLORIDE BLD-SCNC: 101 MEQ/L (ref 98–111)
CHLORIDE BLD-SCNC: 102 MEQ/L (ref 98–111)
CO2: 24 MEQ/L (ref 23–33)
CO2: 25 MEQ/L (ref 23–33)
CO2: 25 MEQ/L (ref 23–33)
CREAT SERPL-MCNC: 0.6 MG/DL (ref 0.4–1.2)
CREAT SERPL-MCNC: 0.7 MG/DL (ref 0.4–1.2)
EOSINOPHIL # BLD: 1 %
EOSINOPHILS ABSOLUTE: 0.1 THOU/MM3 (ref 0–0.4)
ERYTHROCYTE [DISTWIDTH] IN BLOOD BY AUTOMATED COUNT: 18.6 % (ref 11.5–14.5)
ERYTHROCYTE [DISTWIDTH] IN BLOOD BY AUTOMATED COUNT: 19.3 % (ref 11.5–14.5)
ERYTHROCYTE [DISTWIDTH] IN BLOOD BY AUTOMATED COUNT: 19.3 % (ref 11.5–14.5)
ERYTHROCYTE [DISTWIDTH] IN BLOOD BY AUTOMATED COUNT: 19.4 % (ref 11.5–14.5)
ERYTHROCYTE [DISTWIDTH] IN BLOOD BY AUTOMATED COUNT: 58 FL (ref 35–45)
ERYTHROCYTE [DISTWIDTH] IN BLOOD BY AUTOMATED COUNT: 58.2 FL (ref 35–45)
ERYTHROCYTE [DISTWIDTH] IN BLOOD BY AUTOMATED COUNT: 58.4 FL (ref 35–45)
ERYTHROCYTE [DISTWIDTH] IN BLOOD BY AUTOMATED COUNT: 59.2 FL (ref 35–45)
FERRITIN: 649 NG/ML (ref 10–291)
FOLATE: 4 NG/ML (ref 4.8–24.2)
GFR SERPL CREATININE-BSD FRML MDRD: 83 ML/MIN/1.73M2
GFR SERPL CREATININE-BSD FRML MDRD: > 90 ML/MIN/1.73M2
GLUCOSE BLD-MCNC: 122 MG/DL (ref 70–108)
GLUCOSE BLD-MCNC: 123 MG/DL (ref 70–108)
GLUCOSE BLD-MCNC: 128 MG/DL (ref 70–108)
GLUCOSE BLD-MCNC: 138 MG/DL (ref 70–108)
GLUCOSE BLD-MCNC: 145 MG/DL (ref 70–108)
GLUCOSE BLD-MCNC: 157 MG/DL (ref 70–108)
HCT VFR BLD CALC: 21.5 % (ref 37–47)
HCT VFR BLD CALC: 22.7 % (ref 37–47)
HCT VFR BLD CALC: 23.6 % (ref 37–47)
HCT VFR BLD CALC: 33.1 % (ref 37–47)
HEMOGLOBIN: 10.5 GM/DL (ref 12–16)
HEMOGLOBIN: 6.6 GM/DL (ref 12–16)
HEMOGLOBIN: 7.1 GM/DL (ref 12–16)
HEMOGLOBIN: 7.4 GM/DL (ref 12–16)
HEPARIN UNFRACTIONATED: < 0.04 U/ML (ref 0.3–0.7)
HEPARIN UNFRACTIONATED: < 0.04 U/ML (ref 0.3–0.7)
IMMATURE GRANS (ABS): 0.1 THOU/MM3 (ref 0–0.07)
IMMATURE GRANULOCYTES: 1 %
IMMATURE RETIC FRACT: 28.5 % (ref 3–15.9)
IRON: 41 UG/DL (ref 50–170)
LACTIC ACID: 1.5 MMOL/L (ref 0.5–2)
LD: 238 U/L (ref 100–190)
LYMPHOCYTES # BLD: 3.6 %
LYMPHOCYTES ABSOLUTE: 0.4 THOU/MM3 (ref 1–4.8)
MAGNESIUM: 2 MG/DL (ref 1.6–2.4)
MAGNESIUM: 2.1 MG/DL (ref 1.6–2.4)
MAGNESIUM: 2.1 MG/DL (ref 1.6–2.4)
MAGNESIUM: 2.2 MG/DL (ref 1.6–2.4)
MAGNESIUM: 2.2 MG/DL (ref 1.6–2.4)
MCH RBC QN AUTO: 26.4 PG (ref 26–33)
MCH RBC QN AUTO: 27 PG (ref 26–33)
MCH RBC QN AUTO: 27.2 PG (ref 26–33)
MCH RBC QN AUTO: 28.3 PG (ref 26–33)
MCHC RBC AUTO-ENTMCNC: 30.7 GM/DL (ref 32.2–35.5)
MCHC RBC AUTO-ENTMCNC: 31.3 GM/DL (ref 32.2–35.5)
MCHC RBC AUTO-ENTMCNC: 31.4 GM/DL (ref 32.2–35.5)
MCHC RBC AUTO-ENTMCNC: 31.7 GM/DL (ref 32.2–35.5)
MCV RBC AUTO: 86 FL (ref 81–99)
MCV RBC AUTO: 86.1 FL (ref 81–99)
MCV RBC AUTO: 87 FL (ref 81–99)
MCV RBC AUTO: 89.2 FL (ref 81–99)
MONOCYTES # BLD: 5.2 %
MONOCYTES ABSOLUTE: 0.5 THOU/MM3 (ref 0.4–1.3)
NUCLEATED RED BLOOD CELLS: 0 /100 WBC
PHOSPHORUS: 1.6 MG/DL (ref 2.4–4.7)
PHOSPHORUS: 1.7 MG/DL (ref 2.4–4.7)
PHOSPHORUS: 1.7 MG/DL (ref 2.4–4.7)
PHOSPHORUS: 2 MG/DL (ref 2.4–4.7)
PHOSPHORUS: 2.3 MG/DL (ref 2.4–4.7)
PLATELET # BLD: 84 THOU/MM3 (ref 130–400)
PLATELET # BLD: 85 THOU/MM3 (ref 130–400)
PLATELET # BLD: 93 THOU/MM3 (ref 130–400)
PLATELET # BLD: 94 THOU/MM3 (ref 130–400)
PMV BLD AUTO: 10 FL (ref 9.4–12.4)
PMV BLD AUTO: 10.3 FL (ref 9.4–12.4)
PMV BLD AUTO: 10.7 FL (ref 9.4–12.4)
PMV BLD AUTO: 9.6 FL (ref 9.4–12.4)
POTASSIUM REFLEX MAGNESIUM: 3.9 MEQ/L (ref 3.5–5.2)
POTASSIUM REFLEX MAGNESIUM: 3.9 MEQ/L (ref 3.5–5.2)
POTASSIUM REFLEX MAGNESIUM: 4 MEQ/L (ref 3.5–5.2)
POTASSIUM REFLEX MAGNESIUM: 4.2 MEQ/L (ref 3.5–5.2)
POTASSIUM REFLEX MAGNESIUM: 4.4 MEQ/L (ref 3.5–5.2)
POTASSIUM SERPL-SCNC: 3.9 MEQ/L (ref 3.5–5.2)
RBC # BLD: 2.5 MILL/MM3 (ref 4.2–5.4)
RBC # BLD: 2.61 MILL/MM3 (ref 4.2–5.4)
RBC # BLD: 2.74 MILL/MM3 (ref 4.2–5.4)
RBC # BLD: 3.71 MILL/MM3 (ref 4.2–5.4)
RETIC HEMOGLOBIN: 23.2 PG (ref 28.2–35.7)
RETICULOCYTE ABSOLUTE COUNT: 3.4 % (ref 0.5–2)
REVIEWED BY: NORMAL
RH FACTOR: NORMAL
SEG NEUTROPHILS: 89.1 %
SEGMENTED NEUTROPHILS ABSOLUTE COUNT: 9.4 THOU/MM3 (ref 1.8–7.7)
SMEAR REVIEW: NORMAL
SODIUM BLD-SCNC: 132 MEQ/L (ref 135–145)
SODIUM BLD-SCNC: 133 MEQ/L (ref 135–145)
SODIUM BLD-SCNC: 135 MEQ/L (ref 135–145)
T4 FREE: 0.52 NG/DL (ref 0.93–1.76)
TOTAL IRON BINDING CAPACITY: 142 UG/DL (ref 171–450)
TOTAL PROTEIN: 4.4 G/DL (ref 6.1–8)
TOTAL PROTEIN: 4.7 G/DL (ref 6.1–8)
TOTAL PROTEIN: 4.8 G/DL (ref 6.1–8)
TOTAL PROTEIN: 4.9 G/DL (ref 6.1–8)
TOTAL PROTEIN: 5.6 G/DL (ref 6.1–8)
TSH SERPL DL<=0.05 MIU/L-ACNC: 4.91 UIU/ML (ref 0.4–4.2)
VITAMIN B-12: 875 PG/ML (ref 211–911)
WBC # BLD: 10.5 THOU/MM3 (ref 4.8–10.8)
WBC # BLD: 10.7 THOU/MM3 (ref 4.8–10.8)
WBC # BLD: 11.7 THOU/MM3 (ref 4.8–10.8)
WBC # BLD: 13.6 THOU/MM3 (ref 4.8–10.8)

## 2021-11-08 PROCEDURE — 85520 HEPARIN ASSAY: CPT

## 2021-11-08 PROCEDURE — C9113 INJ PANTOPRAZOLE SODIUM, VIA: HCPCS | Performed by: NURSE PRACTITIONER

## 2021-11-08 PROCEDURE — 83550 IRON BINDING TEST: CPT

## 2021-11-08 PROCEDURE — 82746 ASSAY OF FOLIC ACID SERUM: CPT

## 2021-11-08 PROCEDURE — 82948 REAGENT STRIP/BLOOD GLUCOSE: CPT

## 2021-11-08 PROCEDURE — 6360000002 HC RX W HCPCS: Performed by: INTERNAL MEDICINE

## 2021-11-08 PROCEDURE — 6370000000 HC RX 637 (ALT 250 FOR IP): Performed by: FAMILY MEDICINE

## 2021-11-08 PROCEDURE — 2700000000 HC OXYGEN THERAPY PER DAY

## 2021-11-08 PROCEDURE — 83540 ASSAY OF IRON: CPT

## 2021-11-08 PROCEDURE — 85027 COMPLETE CBC AUTOMATED: CPT

## 2021-11-08 PROCEDURE — 6370000000 HC RX 637 (ALT 250 FOR IP): Performed by: STUDENT IN AN ORGANIZED HEALTH CARE EDUCATION/TRAINING PROGRAM

## 2021-11-08 PROCEDURE — 99233 SBSQ HOSP IP/OBS HIGH 50: CPT | Performed by: INTERNAL MEDICINE

## 2021-11-08 PROCEDURE — 85025 COMPLETE CBC W/AUTO DIFF WBC: CPT

## 2021-11-08 PROCEDURE — 6360000002 HC RX W HCPCS: Performed by: STUDENT IN AN ORGANIZED HEALTH CARE EDUCATION/TRAINING PROGRAM

## 2021-11-08 PROCEDURE — 99232 SBSQ HOSP IP/OBS MODERATE 35: CPT | Performed by: NURSE PRACTITIONER

## 2021-11-08 PROCEDURE — 2580000003 HC RX 258: Performed by: INTERNAL MEDICINE

## 2021-11-08 PROCEDURE — P9016 RBC LEUKOCYTES REDUCED: HCPCS

## 2021-11-08 PROCEDURE — 84100 ASSAY OF PHOSPHORUS: CPT

## 2021-11-08 PROCEDURE — 2000000000 HC ICU R&B

## 2021-11-08 PROCEDURE — 86900 BLOOD TYPING SEROLOGIC ABO: CPT

## 2021-11-08 PROCEDURE — 6360000002 HC RX W HCPCS

## 2021-11-08 PROCEDURE — 84443 ASSAY THYROID STIM HORMONE: CPT

## 2021-11-08 PROCEDURE — 2500000003 HC RX 250 WO HCPCS: Performed by: STUDENT IN AN ORGANIZED HEALTH CARE EDUCATION/TRAINING PROGRAM

## 2021-11-08 PROCEDURE — 86901 BLOOD TYPING SEROLOGIC RH(D): CPT

## 2021-11-08 PROCEDURE — 99291 CRITICAL CARE FIRST HOUR: CPT | Performed by: INTERNAL MEDICINE

## 2021-11-08 PROCEDURE — 82607 VITAMIN B-12: CPT

## 2021-11-08 PROCEDURE — 94003 VENT MGMT INPAT SUBQ DAY: CPT

## 2021-11-08 PROCEDURE — 6360000002 HC RX W HCPCS: Performed by: FAMILY MEDICINE

## 2021-11-08 PROCEDURE — 83605 ASSAY OF LACTIC ACID: CPT

## 2021-11-08 PROCEDURE — 83010 ASSAY OF HAPTOGLOBIN QUANT: CPT

## 2021-11-08 PROCEDURE — 94761 N-INVAS EAR/PLS OXIMETRY MLT: CPT

## 2021-11-08 PROCEDURE — 94640 AIRWAY INHALATION TREATMENT: CPT

## 2021-11-08 PROCEDURE — 2580000003 HC RX 258: Performed by: STUDENT IN AN ORGANIZED HEALTH CARE EDUCATION/TRAINING PROGRAM

## 2021-11-08 PROCEDURE — 83735 ASSAY OF MAGNESIUM: CPT

## 2021-11-08 PROCEDURE — 80053 COMPREHEN METABOLIC PANEL: CPT

## 2021-11-08 PROCEDURE — 2500000003 HC RX 250 WO HCPCS: Performed by: INTERNAL MEDICINE

## 2021-11-08 PROCEDURE — 36415 COLL VENOUS BLD VENIPUNCTURE: CPT

## 2021-11-08 PROCEDURE — 86850 RBC ANTIBODY SCREEN: CPT

## 2021-11-08 PROCEDURE — 85046 RETICYTE/HGB CONCENTRATE: CPT

## 2021-11-08 PROCEDURE — 82330 ASSAY OF CALCIUM: CPT

## 2021-11-08 PROCEDURE — 86923 COMPATIBILITY TEST ELECTRIC: CPT

## 2021-11-08 PROCEDURE — 6360000002 HC RX W HCPCS: Performed by: NURSE PRACTITIONER

## 2021-11-08 PROCEDURE — 82728 ASSAY OF FERRITIN: CPT

## 2021-11-08 PROCEDURE — 83615 LACTATE (LD) (LDH) ENZYME: CPT

## 2021-11-08 PROCEDURE — 84439 ASSAY OF FREE THYROXINE: CPT

## 2021-11-08 RX ORDER — DEXTROSE MONOHYDRATE 25 G/50ML
12.5 INJECTION, SOLUTION INTRAVENOUS PRN
Status: DISCONTINUED | OUTPATIENT
Start: 2021-11-08 | End: 2021-12-01 | Stop reason: HOSPADM

## 2021-11-08 RX ORDER — DEXAMETHASONE SODIUM PHOSPHATE 4 MG/ML
10 INJECTION, SOLUTION INTRA-ARTICULAR; INTRALESIONAL; INTRAMUSCULAR; INTRAVENOUS; SOFT TISSUE ONCE
Status: DISCONTINUED | OUTPATIENT
Start: 2021-11-08 | End: 2021-11-08

## 2021-11-08 RX ORDER — NICOTINE POLACRILEX 4 MG
15 LOZENGE BUCCAL PRN
Status: DISCONTINUED | OUTPATIENT
Start: 2021-11-08 | End: 2021-12-01 | Stop reason: HOSPADM

## 2021-11-08 RX ORDER — SODIUM CHLORIDE 9 MG/ML
INJECTION, SOLUTION INTRAVENOUS PRN
Status: DISCONTINUED | OUTPATIENT
Start: 2021-11-08 | End: 2021-11-12

## 2021-11-08 RX ORDER — FENTANYL CITRATE 50 UG/ML
25 INJECTION, SOLUTION INTRAMUSCULAR; INTRAVENOUS
Status: DISCONTINUED | OUTPATIENT
Start: 2021-11-08 | End: 2021-12-01 | Stop reason: HOSPADM

## 2021-11-08 RX ORDER — DEXAMETHASONE SODIUM PHOSPHATE 4 MG/ML
10 INJECTION, SOLUTION INTRA-ARTICULAR; INTRALESIONAL; INTRAMUSCULAR; INTRAVENOUS; SOFT TISSUE ONCE
Status: COMPLETED | OUTPATIENT
Start: 2021-11-08 | End: 2021-11-08

## 2021-11-08 RX ORDER — SODIUM CHLORIDE 9 MG/ML
INJECTION, SOLUTION INTRAVENOUS PRN
Status: DISCONTINUED | OUTPATIENT
Start: 2021-11-08 | End: 2021-11-10

## 2021-11-08 RX ORDER — FENTANYL CITRATE 50 UG/ML
INJECTION, SOLUTION INTRAMUSCULAR; INTRAVENOUS
Status: COMPLETED
Start: 2021-11-08 | End: 2021-11-08

## 2021-11-08 RX ORDER — DEXTROSE MONOHYDRATE 50 MG/ML
100 INJECTION, SOLUTION INTRAVENOUS PRN
Status: DISCONTINUED | OUTPATIENT
Start: 2021-11-08 | End: 2021-12-01 | Stop reason: HOSPADM

## 2021-11-08 RX ADMIN — ALLOPURINOL 300 MG: 300 TABLET ORAL at 10:15

## 2021-11-08 RX ADMIN — SODIUM CHLORIDE 900 MG: 9 INJECTION, SOLUTION INTRAVENOUS at 20:54

## 2021-11-08 RX ADMIN — SODIUM CHLORIDE 1 MCG/KG/HR: 9 INJECTION, SOLUTION INTRAVENOUS at 23:35

## 2021-11-08 RX ADMIN — VANCOMYCIN HYDROCHLORIDE 1500 MG: 5 INJECTION, POWDER, LYOPHILIZED, FOR SOLUTION INTRAVENOUS at 16:43

## 2021-11-08 RX ADMIN — DEXAMETHASONE SODIUM PHOSPHATE 10 MG: 4 INJECTION, SOLUTION INTRAMUSCULAR; INTRAVENOUS at 13:40

## 2021-11-08 RX ADMIN — FENTANYL CITRATE 25 MCG: 0.05 INJECTION, SOLUTION INTRAMUSCULAR; INTRAVENOUS at 08:38

## 2021-11-08 RX ADMIN — SODIUM HYPOCHLORITE: 1.25 SOLUTION TOPICAL at 08:45

## 2021-11-08 RX ADMIN — FENTANYL CITRATE 25 MCG: 50 INJECTION, SOLUTION INTRAMUSCULAR; INTRAVENOUS at 08:38

## 2021-11-08 RX ADMIN — SODIUM CHLORIDE 0.9 MCG/KG/HR: 9 INJECTION, SOLUTION INTRAVENOUS at 13:20

## 2021-11-08 RX ADMIN — SODIUM CHLORIDE 0.9 MCG/KG/HR: 9 INJECTION, SOLUTION INTRAVENOUS at 06:50

## 2021-11-08 RX ADMIN — ALBUTEROL SULFATE 2.5 MG: 2.5 SOLUTION RESPIRATORY (INHALATION) at 18:07

## 2021-11-08 RX ADMIN — ALBUTEROL SULFATE 2.5 MG: 2.5 SOLUTION RESPIRATORY (INHALATION) at 00:07

## 2021-11-08 RX ADMIN — PANTOPRAZOLE SODIUM 40 MG: 40 INJECTION, POWDER, FOR SOLUTION INTRAVENOUS at 10:15

## 2021-11-08 RX ADMIN — Medication: at 19:58

## 2021-11-08 RX ADMIN — SODIUM CHLORIDE SOLN NEBU 3% 4 ML: 3 NEBU SOLN at 06:08

## 2021-11-08 RX ADMIN — PHENYLEPHRINE HYDROCHLORIDE 10 MCG/MIN: 10 INJECTION INTRAVENOUS at 04:06

## 2021-11-08 RX ADMIN — CEFEPIME HYDROCHLORIDE 2000 MG: 2 INJECTION, POWDER, FOR SOLUTION INTRAVENOUS at 11:08

## 2021-11-08 RX ADMIN — Medication: at 05:58

## 2021-11-08 RX ADMIN — TIOTROPIUM BROMIDE AND OLODATEROL 2 PUFF: 3.124; 2.736 SPRAY, METERED RESPIRATORY (INHALATION) at 06:08

## 2021-11-08 RX ADMIN — Medication: at 06:00

## 2021-11-08 RX ADMIN — Medication: at 00:10

## 2021-11-08 RX ADMIN — FENTANYL CITRATE 25 MCG: 0.05 INJECTION, SOLUTION INTRAMUSCULAR; INTRAVENOUS at 20:19

## 2021-11-08 RX ADMIN — SODIUM CHLORIDE 25 ML: 9 INJECTION, SOLUTION INTRAVENOUS at 20:43

## 2021-11-08 RX ADMIN — CEFEPIME HYDROCHLORIDE 2000 MG: 2 INJECTION, POWDER, FOR SOLUTION INTRAVENOUS at 21:55

## 2021-11-08 RX ADMIN — ALBUTEROL SULFATE 2.5 MG: 2.5 SOLUTION RESPIRATORY (INHALATION) at 06:08

## 2021-11-08 RX ADMIN — Medication: at 04:08

## 2021-11-08 RX ADMIN — ALBUTEROL SULFATE 2.5 MG: 2.5 SOLUTION RESPIRATORY (INHALATION) at 12:13

## 2021-11-08 RX ADMIN — Medication: at 14:32

## 2021-11-08 RX ADMIN — POTASSIUM PHOSPHATE, MONOBASIC AND POTASSIUM PHOSPHATE, DIBASIC 12 MMOL: 224; 236 INJECTION, SOLUTION, CONCENTRATE INTRAVENOUS at 06:30

## 2021-11-08 RX ADMIN — SODIUM CHLORIDE SOLN NEBU 3% 4 ML: 3 NEBU SOLN at 18:07

## 2021-11-08 RX ADMIN — HEPARIN SODIUM: 1000 INJECTION, SOLUTION INTRAVENOUS; SUBCUTANEOUS at 15:50

## 2021-11-08 RX ADMIN — Medication: at 00:09

## 2021-11-08 RX ADMIN — SODIUM CHLORIDE 0.9 MCG/KG/HR: 9 INJECTION, SOLUTION INTRAVENOUS at 18:28

## 2021-11-08 ASSESSMENT — PULMONARY FUNCTION TESTS
PIF_VALUE: 24
PIF_VALUE: 25
PIF_VALUE: 22
PIF_VALUE: 24

## 2021-11-08 ASSESSMENT — PAIN SCALES - GENERAL
PAINLEVEL_OUTOF10: 10
PAINLEVEL_OUTOF10: 10
PAINLEVEL_OUTOF10: 0

## 2021-11-08 NOTE — PROGRESS NOTES
[facility]         Renal Inpatient Follow-Up note         11/8/2021 8:00 AM    Pt Name:   Chetna Gramajo  YOB: 1950  Attending:   Haley Cline MD    Chief Complaint : Chetna Gramajo is a 73 y.o. female being followed by nephrology for ALVIN/CKD    Interval History :   Patient seen and examined by me.  No distress  Intubated cannot assess history or review of systems  Not much urine output  CVVH running well with 100 mL of ultrafiltration per hour  Patient is at 40% FiO2  Still on Benjamin-Synephrine at a low-dose, blood pressure better     Scheduled Medications :    potassium phosphate IVPB  12 mmol IntraVENous Once    phosphorus replacement protocol   Other RX Placeholder    sodium chloride (Inhalant)  4 mL Nebulization BID    vancomycin  1,500 mg IntraVENous Q24H    cefepime  2,000 mg IntraVENous Q12H    tiotropium-olodaterol  2 puff Inhalation Daily    [Held by provider] aspirin  81 mg Oral Daily    vancomycin (VANCOCIN) intermittent dosing (placeholder)   Other RX Placeholder    pantoprazole  40 mg IntraVENous QAM    sodium hypochlorite   Irrigation Daily    Riociguat  2.5 mg Oral Q8H    sodium chloride flush  5-40 mL IntraVENous 2 times per day    allopurinol  300 mg Oral Daily    [Held by provider] FLUoxetine  40 mg Oral Daily    [Held by provider] metoprolol tartrate  12.5 mg Oral BID    albuterol  2.5 mg Nebulization Q6H      sodium chloride      dexmedetomidine 0.9 mcg/kg/hr (11/08/21 0650)    heparin 5000 units CRRT syringe 1 mL/hr at 11/07/21 2012    sodium chloride      bumetanide 0.1 mg/mL infusion 0.5 mg/hr (11/05/21 0904)    prismaSATE BGK 4/2.5 1,000 mL/hr at 11/08/21 0600    prismaSol BGK 4/2.5 1,000 mL/hr at 11/08/21 0558    prismaSol BGK 4/2.5 500 mL/hr at 11/08/21 0408    sodium chloride      sodium chloride      phenylephrine (BENJAMIN-SYNEPHRINE) 250 mg/250 mL infusion 75 mcg/min (11/08/21 0659)    sodium chloride      sodium chloride 25 mL (10/30/21 0129)    [Held by provider] sodium chloride 50 mL/hr at 10/28/21 2314       Vitals :  BP (!) 113/46   Pulse 74   Temp 98 °F (36.7 °C) (Rectal)   Resp 17   Ht 4' 9\" (1.448 m)   Wt 202 lb 9.6 oz (91.9 kg)   SpO2 93%   BMI 43.84 kg/m²     24HR INTAKE/OUTPUT:      Intake/Output Summary (Last 24 hours) at 11/8/2021 0800  Last data filed at 11/8/2021 0701  Gross per 24 hour   Intake 2350.65 ml   Output 4425 ml   Net -2074.35 ml     Last 3 weights  Wt Readings from Last 3 Encounters:   11/07/21 202 lb 9.6 oz (91.9 kg)   10/25/21 164 lb (74.4 kg)   09/29/21 160 lb (72.6 kg)           Physical Exam :  General Appearance:  Well developed. No distress  Mouth/Throat:  Oral mucosa moist.  ET tube in place  Neck:  Supple, no JVD  Lungs:  Breath sounds: clear, diminished  Heart[de-identified]  S1,S2 heard  Abdomen:  Soft, non - tender  Musculoskeletal:  Edema - noted but appears better         Last 3 CBC   Recent Labs     11/07/21 2025 11/08/21  0015 11/08/21  0420   WBC 12.7* 11.7* 10.5   RBC 2.80* 2.74* 2.50*   HGB 7.5* 7.4* 6.6*   HCT 23.9* 23.6* 21.5*   PLT 99* 93* 84*     Last 3 CMP  Recent Labs     11/07/21 2025 11/07/21 2025 11/08/21  0015 11/08/21  0420   *  --  132* 135   K 4.0   < > 4.2 3.9  3.9     --  100 102   CO2 25  --  24 24   BUN 12  --  12 12   CREATININE 0.6  --  0.6 0.6   CALCIUM 8.1*  --  7.9* 7.8*   LABALBU 2.4*  --  2.2* 2.1*   BILITOT 0.5  --  0.6 0.5    < > = values in this interval not displayed. ASSESSMENT / Plan   1. Renal -acute kidney injury, multifactorial etiology which includes hypotension from sepsis, IV contrast exposure on 10/29 and now has some hydronephrosis as well . ? Patient's creatinine continued to get worse, but primarily fluid status worsened, did not respond to diuretics so started her on CVVH on 11/5/2021  ? So far tolerating well.   All the renal parameters and electrolytes look well so primary leads for fluid removal at this point so decrease the therapy fluid to 500/500/250  ? Change labs to every 6 hours and replace electrolytes as needed     2. Electrolytes -replace all electrolytes per protocol as needed  3. Mild acidosis currently resolved while on CVVH  4. Acute hypercapnic respiratory failure currently on a vent  5. Pneumonia with pleural effusion and septic shock  6. Hypotension on pressors wean to MAP greater than 65  7. Hydronephrosis status post nephrostomy tube placement  8. Meds reviewed and discussed with  nursing staff     Dr. Karol Villasenor MD, M,D.  [facility].

## 2021-11-08 NOTE — PROGRESS NOTES
[address]  [id number] [facility] [address]  Dept: [phone]  Loc: [phone]  Visit Date: 10/27/2021  Urology Progress Note      Reason for Urology Consult: kidney stone    Subjective:   Patient is resting in bed, intubated. Family at bedside. CBI running at 200 ml per hour, on a pump for accurate I and O, urine clear in tubing, clear in bag. Left nephrostomy tube with bloody drainage in bag, low ouput  On CRRT   Heparin drip off  HH 6.6, repeat 7.1 going to redraw later today. Urine cx from  and 10/27 are negative. Ux sent from neph shows staphylococcus epidermis, MRSE, very light growth. (sens to linezolid and vanc) pt afebrile.       Vitals:  BP (!) 114/51   Pulse 74   Temp 98.4 °F (36.9 °C) (Rectal)   Resp (!) 46   Ht 4' 9\" (1.448 m)   Wt 202 lb 9.6 oz (91.9 kg)   SpO2 96%   BMI 43.84 kg/m²   Temp  Av.4 °F (36.9 °C)  Min: 98 °F (36.7 °C)  Max: 98.8 °F (37.1 °C)    Intake/Output Summary (Last 24 hours) at 2021 1019  Last data filed at 2021 1005  Gross per 24 hour   Intake 2191.89 ml   Output 4544 ml   Net -2352.11 ml       Social History     Socioeconomic History    Marital status: Single     Spouse name: Not on file    Number of children: 0    Years of education: Not on file    Highest education level: Not on file   Occupational History    Not on file   Tobacco Use    Smoking status: Never Smoker    Smokeless tobacco: Never Used   Vaping Use    Vaping Use: Never used   Substance and Sexual Activity    Alcohol use: No    Drug use: No    Sexual activity: Not Currently   Other Topics Concern    Not on file   Social History Narrative    Not on file     Social Determinants of Health     Financial Resource Strain: Low Risk     Difficulty of Paying Living Expenses: Not very hard   Food Insecurity: No Food Insecurity    Worried About Running Out of Food in the Last Year: Never true    Traci of Food in the Last Year: Never true   Transportation Needs:     Lack of Transportation (Medical): Not on file    Lack of Transportation (Non-Medical): Not on file   Physical Activity:     Days of Exercise per Week: Not on file    Minutes of Exercise per Session: Not on file   Stress:     Feeling of Stress : Not on file   Social Connections:     Frequency of Communication with Friends and Family: Not on file    Frequency of Social Gatherings with Friends and Family: Not on file    Attends [employer] Services: Not on file    Active Member of Clubs or Organizations: Not on file    Attends Club or Organization Meetings: Not on file    Marital Status: Not on file   Intimate Partner Violence:     Fear of Current or Ex-Partner: Not on file    Emotionally Abused: Not on file    Physically Abused: Not on file    Sexually Abused: Not on file   Housing Stability:     Unable to Pay for Housing in the Last Year: Not on file    Number of Places Lived in the Last Year: Not on file    Unstable Housing in the Last Year: Not on file     Family History   Problem Relation Age of Onset    Diabetes Father     Arthritis Mother     COPD Mother     Diabetes Sister     Heart Disease Maternal Uncle     Breast Cancer Niece 36    Sleep Apnea Brother     Asthma Neg Hx     Birth Defects Neg Hx     Cancer Neg Hx     Depression Neg Hx     Early Death Neg Hx     Hearing Loss Neg Hx     High Blood Pressure Neg Hx     High Cholesterol Neg Hx     Kidney Disease Neg Hx     Learning Disabilities Neg Hx     Mental Illness Neg Hx     Mental Retardation Neg Hx     Miscarriages / Stillbirths Neg Hx     Stroke Neg Hx     Substance Abuse Neg Hx     Vision Loss Neg Hx     Other Neg Hx      No Known Allergies    Objective:    Constitutional: intubated, opens eyes to name   HEENT:   Head:         Normocephalic and atraumatic. Mucous membranes are normal.   Eyes:         EOM are normal. No scleral icterus.   Nose:    The external appearance

## 2021-11-08 NOTE — PROGRESS NOTES
CRITICAL CARE PROGRESS NOTE      Patient:  Clarisse Varner    Unit/Bed:4D-09/009-A  YOB: 1950  MRN: [id number]   PCP: Lesly Allen MD  Date of Admission: 10/27/2021  Chief Complaint:- Shortness of breath    Assessment and Plan:    Acute combined hypercapnic and hypoxic respiratory failure: Secondary to severe pneumonia with left-sided pleural effusion. Currently on pressure control ventilation with Pressure control 14 PEEP 6 FiO2 30%. Failed SBT 11/4/21. Will wean settings and attempt SBT in 11/8/21 Switched to Precedex to facilitate weaning. Maintain lung protection strategies with peak pressure less than 35 cmH2O  Severe bilateral multiorganism pneumonia: PCR positive Staph w/ MECA gene and adenovirus. Culture growing MSSA. Repeat culture 11/5/21 shows coag positive staph on Day 12/14 Vancomycin (started 10/28/21) and completed 5 days Rocephin. Septic Shock: 2/2 severe PNA. CI 6.3 NE d/c 2/2 worsening afib RVR. NE d/c on 11/3/21 2/2 worsening afib/RVR. .Currently on pressor support w/ phenylephrine. Nephrostomy and repeat cultures 11/5 pending on cefipmie emperically   Stage II ALVIN on CKD 3: Suspect post renal etiology 2/2 staghorn calculi. Nephrology following. CRRT initiated 11/5/21 . Of note patient does have baseline CKD positive RYLAN screen suggests underlying autoimmune etiology. Mitochondrial Abs pending, Anti-DS-DNA negative, Anti histone negative, Anti Smith,and  Anti-Savana WNL,  GBM antibodies negative, C3/C4 levels normal, elevated kappa/lambda free light chains with normal ratio. ANCA elevated 416. No plans per Bx per nephrology. Fluid collection noted on left renal unlikely abscess as patient remains afebrile. .  Suspected Pyelonephritis: perinephric stranding noted on CT abdomen and pelvis w/ air fluid region discontiguous with region of nephrostomy tube insertion concerning for abscess (<3 cm) vs emphysematous pyelonephritis when discussed with radiologist. Culture of nephrostomy tube aspirate 11/5/21 shows Coag negative staph. Patient is on day 4/14 on empiric cefepime (started 11/5/21)  Hydronephrosis 2/2 Left-sided staghorn calculi: Urology following. Percutaneous drainage tube 11/2/21 IR. Low output following tube placement. Increased flow on CBI per urology recs. Still flushing clots  Acute blood loss anemia: Suspect consumptive process. Leading differential , smear, reticulocytes, LDH, Haptoglobin, MERCEDES, B12/folate  pendingsuspect 2/2 hemolysis on CRRT. PRBC x1 transfused 10/31/21, 11/4/21, 11/5/21, x2 11/6/21, x1 11/8/21. Heparin stopped (dialysis dose still running). Holding ASA  Thrombocytopenia: suspect consumptive process. Workup per above. Not heparin per above. Platelets 84 57/2/16. Hematuria: s/p perc tube placement. CBI per above. Stopped heparin, holding ASA. Bilateral Pleural Effusion:  2/2 PNA per above with associated volume overload in context of renal failure. Interval enlargement noted on CT 11/1/21. Improvemed in concomitant pericardial effusion  Suspected COPD: current every day smoker. Obstructive process noted on flow volume loop on ventilator. Started empiric therapy with Stiolto. Recommend formal PFT once improved  PAH/chronic RV failure NYHA II: EF 55 to 60% G2 DD TTE 5/4/2021. RHC showed [facility] with elevated PCWP. Treated w/ Riociguat. MERCEDES: hematuria s/p perc tube placement , given one unit PRBC overnight  Cholelithiasis: w/ dilated CBD. Noted on 10/30/2021 US liver/GB. GI following. Does not suspect choledocolithiasis at this time. May consider HIDA scan once stable   Stage III decubitus ulcer with inferior tunneling: S/p excision VAC placement 8/21. Low suspicion for active infection  Moderate malnutrition: Pre-albumin 14.4 w/ notable cachexia  Chronic tobacco use:   cessation  Mild AS: Noted on previous TTE not appreciated on repeat imaging  Gout:  W/o exacerbation.  Continue allopurinol  BARBER: non compliant w/ CPAP  New onset atrial fibrillation with rapid ventricular response (resolved): now in NSR s/p DCCV 11/3/21 Stopped Heparin per hematuria & and now in NSR. Stopped Amiodarone 11/8/21. Holding metoprolol 2/2 hypotension  Hyperkalemia (resolved): 2/2 ALVIN  Hyponatremia (resolved): Suspect 2/2 renal failure. Unable to obtain urine Na 2/2 bladder irrigation. Bumex resolved w/ CRRT  Nutrition: on TF @30 ml       INITIAL H AND P AND ICU COURSE:  71F admitted to [facility] 10/27/21 w/ SOB. Current smoker w/ PMH PAH grp 3, HFpEF, stage III sacral ulcer s/p wound ostomy 9/21. Patient sister reports SpO2 51% at home and was brought to ED where ABG showed pH 7.19, PCO2 80, PO2 134. She required emergent intubation and was transferred to ICU. Workup revealed left sided pleural effusion and underwent US guided thoracentesis w/ 1100 cc removed consistent w/ MRSA/adenovirus. Patient was noted to be in afib/RVR and was placed on amio, heparin drip. Patient was also noted to have normocytic anemia and received 1 unit PRBC 10/31/21. CT abdomen revealed CBD dilation w/ cholelithiasis. 11/1/2021: Plan for nephrostomy tubes today. Hemoglobin 7.7 s/p 1 unit PRBC yesterday. Weaned to 4 mics Levophed. 11/2/2021: Patient resumed back on prior dose of vancomycin. Low urine output w/ increasing pressor requirements today    11/3/2021: Will attempt SBT if able to wean today. Increased Amio drip 2/2 worsening tachycardia. Continued hematuria w/ low urine output. Cr stable. Will evaluate UTI following perc tube placement. Rise in WBC noted. Culture pending    11/4/2021: Cardioversion on 11/3/2021. Now and NSR. CVP 29 this a.m. Suspect drop in CI 2/2 to stopping levophed. Diffuse edema following transfusion overnight. Given one-time dose of 2 mg Bumex and albumin. SBT this AM    11/5/2021: Failed SBT yesterday. Continued low urine output. Trial of Bumex today. Will proceed with dialysis if fails to improve.     11/6/2021: Started on CRRT, continues to have bloody drainage of nephrostomy tube given 1 unit PRBC, cefipime emperically pending cultures of nephrostomy tube fluid and repeat resp cultures. 11/7/21: Patient remains clinically well on exam.  We'll continue medical of UF with CRRT. On empiric cefepime for coverage of perinephric abscess noted on CT abdomen and pelvis November 4, 2021. White count trending down. Will attempt spontaneous breathing trial in a.m.    11/8/21: Transfused 1x PRBC this AM. Anemia workup pending. WBC trending down. Patient ventilator settings this AM preclude SBT. Volume overloaded on exam. UF increased to 125 cc/hr this AM. Will attempt to wean vent settings further further. Coag neg staph growing on nephrostomy tube aspirate. Suspect contamination. Continue existing ABx      Past Medical History:  Per HPI. Family History:  DM in father and sister. Social History: chronic smoker.     ROS   Unable to obtain a comprehensive ROS as patient sedated and intubated     Scheduled Meds:   potassium phosphate IVPB  12 mmol IntraVENous Once    sodium chloride (Inhalant)  4 mL Nebulization BID    vancomycin  1,500 mg IntraVENous Q24H    cefepime  2,000 mg IntraVENous Q12H    tiotropium-olodaterol  2 puff Inhalation Daily    aspirin  81 mg Oral Daily    vancomycin (VANCOCIN) intermittent dosing (placeholder)   Other RX Placeholder    pantoprazole  40 mg IntraVENous QAM    sodium hypochlorite   Irrigation Daily    Riociguat  2.5 mg Oral Q8H    sodium chloride flush  5-40 mL IntraVENous 2 times per day    allopurinol  300 mg Oral Daily    [Held by provider] FLUoxetine  40 mg Oral Daily    [Held by provider] metoprolol tartrate  12.5 mg Oral BID    albuterol  2.5 mg Nebulization Q6H     Continuous Infusions:   sodium chloride      dexmedetomidine 0.9 mcg/kg/hr (11/08/21 0605)    heparin 5000 units CRRT syringe 1 mL/hr at 11/07/21 2012    sodium chloride      bumetanide 0.1 mg/mL infusion 0.5 mg/hr (11/05/21 0904)    prismaSATE BGK 4/2.5 1,000 mL/hr at 11/08/21 0600    prismaSol BGK 4/2.5 1,000 mL/hr at 11/08/21 0558    prismaSol BGK 4/2.5 500 mL/hr at 11/08/21 0408    sodium chloride      sodium chloride      phenylephrine (KRISTAL-SYNEPHRINE) 250 mg/250 mL infusion 40 mcg/min (11/08/21 0617)    sodium chloride      sodium chloride 25 mL (10/30/21 0129)    [Held by provider] sodium chloride 50 mL/hr at 10/28/21 2314       PHYSICAL EXAMINATION:  T:  98.6. P: 68 RR:  13. B/P:  115/50  FiO2:  55. O2 Sat:  92%. I/O: 4954/7441 (irrigation)  Body mass index is 43.84 kg/m². GCS:   8  PC: 16 PEEP: 8: TV: 400: RRTotal: 11: General: Acute on chronically ill-appearing elderly white female  HEENT:  normocephalic and atraumatic. No scleral icterus. PERR  Neck: supple. No Thyromegaly. Left subclavian dialysis catheter   Lungs: Wheezes heard right apex. Diffuse rhonchi appreciated. No retractions  Cardiac: RRR. No JVD. Abdomen: soft. Nontender. ,npehrostomy tube with bloody drainage  Extremities:  +3 pitting edema x4. noted No clubbing, cyanosis   Vasculature: capillary refill < 3 seconds. Palpable dorsalis pedis pulses. Skin:  warm and dry. Psych: Unable to assess as patient is currently sedated and on mechanical ventilation  Lymph:  No supraclavicular adenopathy. Neurologic:  No focal deficit. No seizures. Data: (All radiographs, tracings, PFTs, and imaging are personally viewed and interpreted unless otherwise noted).    CMP : Sodium 135 potassium 3.9 chloride 102 bicarb 24 BUN/creatinine 10.6/9 anion gap 9 ionized calcium 1.27 GFR 71 magnesium 2.1 glucose 138 calcium 7.8 phosphorus 1.9 total protein 4.9 Albumin 2.4 alk phos 132 ALT/AST 13/16 bilirubin 0.4  CBC : WBC 10.5 H&H 6.6/21.5 platelets 84    SARS-CoV-2 NAAT negative on 10/27/2021  Telemetry shows NSR  Chest x-ray 10/31/2021 demonstrated diffuse bilateral patchy opacities with left-sided pleural effusion and cardiomegaly  Respiratory culture  11/5/21 pending  Pneumonia panel collected 10/27/2021 demonstrates staph aureus. MEC-A gene and adenovirus  CT chest on 11/1/2021 demonstrates bilateral pleural effusions with interval enlargement  CXR 11/3/2021 demonstrates worsening pulmonary congestion with bilateral effusions  UA shows bacteria w/ moderate white cells  CT Abdomen and pelvis 11/5/21 showed perinephric stranding with left-sided hypodense nodule with air-fluid level concerning for abscess. Diffuse ascites and anasarca noted. CXR November 7, 2021 shows bilateral pulmonary edema with worsening right-sided infiltrate         Seen with multidisciplinary ICU team.  Meets Continued ICU Level Care Criteria:    [x] Yes   [] No - Transfer Planned to listed location:  [] HOSPITALIST CONTACTED- DR       Electronically signed by Tylor Byrnes DO PGY-2  Patient seen by me. Case discussed with resident physician. Continue with spontaneous breathing trial. Diuresing. Italicized font represents changes to the note made by me. CC time 35 minutes. Time was discontiguous. Time does not include procedures. Time does include my direct assessment of the patient and coordination of care.   Electronically signed by Alexandrea Gloria MD on 11/8/2021 at 7:21 AM

## 2021-11-08 NOTE — SIGNIFICANT EVENT
Labs consistent w/ ANCA associated vasculitis. Discussed case and plan w/ Dr Viet Cazares and Dr Ritesh Oh with plans for initiation of pulse dose steroids at 10mg/kg for a daily dose of 900mg to treat suspect rapidly progressing renal disease. Will continue this therapy for 3 days.  Will start insulin drip for expected rise in blood glucose

## 2021-11-09 ENCOUNTER — APPOINTMENT (OUTPATIENT)
Dept: CT IMAGING | Age: 71
DRG: 004 | End: 2021-11-09
Payer: MEDICARE

## 2021-11-09 ENCOUNTER — APPOINTMENT (OUTPATIENT)
Dept: GENERAL RADIOLOGY | Age: 71
DRG: 004 | End: 2021-11-09
Payer: MEDICARE

## 2021-11-09 LAB
ALBUMIN SERPL-MCNC: 2.3 G/DL (ref 3.5–5.1)
ALBUMIN SERPL-MCNC: 2.3 G/DL (ref 3.5–5.1)
ALBUMIN SERPL-MCNC: 2.5 G/DL (ref 3.5–5.1)
ALP BLD-CCNC: 112 U/L (ref 38–126)
ALP BLD-CCNC: 114 U/L (ref 38–126)
ALP BLD-CCNC: 114 U/L (ref 38–126)
ALT SERPL-CCNC: 12 U/L (ref 11–66)
ANION GAP SERPL CALCULATED.3IONS-SCNC: 7 MEQ/L (ref 8–16)
ANION GAP SERPL CALCULATED.3IONS-SCNC: 7 MEQ/L (ref 8–16)
ANION GAP SERPL CALCULATED.3IONS-SCNC: 8 MEQ/L (ref 8–16)
AST SERPL-CCNC: 11 U/L (ref 5–40)
AST SERPL-CCNC: 11 U/L (ref 5–40)
AST SERPL-CCNC: 13 U/L (ref 5–40)
BILIRUB SERPL-MCNC: 0.7 MG/DL (ref 0.3–1.2)
BILIRUB SERPL-MCNC: 0.7 MG/DL (ref 0.3–1.2)
BILIRUB SERPL-MCNC: 1 MG/DL (ref 0.3–1.2)
BUN BLDV-MCNC: 18 MG/DL (ref 7–22)
BUN BLDV-MCNC: 19 MG/DL (ref 7–22)
BUN BLDV-MCNC: 21 MG/DL (ref 7–22)
CALCIUM IONIZED: 1.2 MMOL/L (ref 1.12–1.32)
CALCIUM IONIZED: 1.23 MMOL/L (ref 1.12–1.32)
CALCIUM IONIZED: 1.24 MMOL/L (ref 1.12–1.32)
CALCIUM IONIZED: 1.27 MMOL/L (ref 1.12–1.32)
CALCIUM SERPL-MCNC: 8.3 MG/DL (ref 8.5–10.5)
CALCIUM SERPL-MCNC: 8.4 MG/DL (ref 8.5–10.5)
CALCIUM SERPL-MCNC: 8.5 MG/DL (ref 8.5–10.5)
CHLORIDE BLD-SCNC: 100 MEQ/L (ref 98–111)
CHLORIDE BLD-SCNC: 102 MEQ/L (ref 98–111)
CHLORIDE BLD-SCNC: 102 MEQ/L (ref 98–111)
CO2: 24 MEQ/L (ref 23–33)
CO2: 24 MEQ/L (ref 23–33)
CO2: 25 MEQ/L (ref 23–33)
CREAT SERPL-MCNC: 0.7 MG/DL (ref 0.4–1.2)
CREAT SERPL-MCNC: 0.7 MG/DL (ref 0.4–1.2)
CREAT SERPL-MCNC: 0.8 MG/DL (ref 0.4–1.2)
ERYTHROCYTE [DISTWIDTH] IN BLOOD BY AUTOMATED COUNT: 18.6 % (ref 11.5–14.5)
ERYTHROCYTE [DISTWIDTH] IN BLOOD BY AUTOMATED COUNT: 18.8 % (ref 11.5–14.5)
ERYTHROCYTE [DISTWIDTH] IN BLOOD BY AUTOMATED COUNT: 18.8 % (ref 11.5–14.5)
ERYTHROCYTE [DISTWIDTH] IN BLOOD BY AUTOMATED COUNT: 18.9 % (ref 11.5–14.5)
ERYTHROCYTE [DISTWIDTH] IN BLOOD BY AUTOMATED COUNT: 57.6 FL (ref 35–45)
ERYTHROCYTE [DISTWIDTH] IN BLOOD BY AUTOMATED COUNT: 58.1 FL (ref 35–45)
ERYTHROCYTE [DISTWIDTH] IN BLOOD BY AUTOMATED COUNT: 58.7 FL (ref 35–45)
ERYTHROCYTE [DISTWIDTH] IN BLOOD BY AUTOMATED COUNT: 58.9 FL (ref 35–45)
GFR SERPL CREATININE-BSD FRML MDRD: 71 ML/MIN/1.73M2
GFR SERPL CREATININE-BSD FRML MDRD: 83 ML/MIN/1.73M2
GFR SERPL CREATININE-BSD FRML MDRD: 83 ML/MIN/1.73M2
GLUCOSE BLD-MCNC: 131 MG/DL (ref 70–108)
GLUCOSE BLD-MCNC: 136 MG/DL (ref 70–108)
GLUCOSE BLD-MCNC: 145 MG/DL (ref 70–108)
GLUCOSE BLD-MCNC: 150 MG/DL (ref 70–108)
GLUCOSE BLD-MCNC: 156 MG/DL (ref 70–108)
GLUCOSE BLD-MCNC: 158 MG/DL (ref 70–108)
GLUCOSE BLD-MCNC: 161 MG/DL (ref 70–108)
GLUCOSE BLD-MCNC: 182 MG/DL (ref 70–108)
GRAM STAIN RESULT: ABNORMAL
HCT VFR BLD CALC: 31 % (ref 37–47)
HCT VFR BLD CALC: 31.3 % (ref 37–47)
HCT VFR BLD CALC: 31.8 % (ref 37–47)
HCT VFR BLD CALC: 34.6 % (ref 37–47)
HEMOGLOBIN: 10 GM/DL (ref 12–16)
HEMOGLOBIN: 10 GM/DL (ref 12–16)
HEMOGLOBIN: 10.2 GM/DL (ref 12–16)
HEMOGLOBIN: 10.9 GM/DL (ref 12–16)
MAGNESIUM: 2.1 MG/DL (ref 1.6–2.4)
MAGNESIUM: 2.1 MG/DL (ref 1.6–2.4)
MAGNESIUM: 2.2 MG/DL (ref 1.6–2.4)
MAGNESIUM: 2.2 MG/DL (ref 1.6–2.4)
MCH RBC QN AUTO: 27.7 PG (ref 26–33)
MCH RBC QN AUTO: 27.8 PG (ref 26–33)
MCH RBC QN AUTO: 28.2 PG (ref 26–33)
MCH RBC QN AUTO: 28.4 PG (ref 26–33)
MCHC RBC AUTO-ENTMCNC: 31.5 GM/DL (ref 32.2–35.5)
MCHC RBC AUTO-ENTMCNC: 31.9 GM/DL (ref 32.2–35.5)
MCHC RBC AUTO-ENTMCNC: 32.1 GM/DL (ref 32.2–35.5)
MCHC RBC AUTO-ENTMCNC: 32.3 GM/DL (ref 32.2–35.5)
MCV RBC AUTO: 86.9 FL (ref 81–99)
MCV RBC AUTO: 87.6 FL (ref 81–99)
MCV RBC AUTO: 88 FL (ref 81–99)
MCV RBC AUTO: 88.6 FL (ref 81–99)
MITOCHONDRIAL M2 AB, IGG: 0.8 U/ML (ref 0–4)
ORGANISM: ABNORMAL
PHOSPHORUS: 1.9 MG/DL (ref 2.4–4.7)
PHOSPHORUS: 2.1 MG/DL (ref 2.4–4.7)
PHOSPHORUS: 2.1 MG/DL (ref 2.4–4.7)
PHOSPHORUS: 2.4 MG/DL (ref 2.4–4.7)
PLATELET # BLD: 101 THOU/MM3 (ref 130–400)
PLATELET # BLD: 82 THOU/MM3 (ref 130–400)
PLATELET # BLD: 94 THOU/MM3 (ref 130–400)
PLATELET # BLD: 96 THOU/MM3 (ref 130–400)
PMV BLD AUTO: 10 FL (ref 9.4–12.4)
PMV BLD AUTO: 10.1 FL (ref 9.4–12.4)
PMV BLD AUTO: 11.1 FL (ref 9.4–12.4)
PMV BLD AUTO: 9.7 FL (ref 9.4–12.4)
POTASSIUM REFLEX MAGNESIUM: 4.3 MEQ/L (ref 3.5–5.2)
POTASSIUM REFLEX MAGNESIUM: 4.5 MEQ/L (ref 3.5–5.2)
POTASSIUM REFLEX MAGNESIUM: 4.6 MEQ/L (ref 3.5–5.2)
RBC # BLD: 3.54 MILL/MM3 (ref 4.2–5.4)
RBC # BLD: 3.59 MILL/MM3 (ref 4.2–5.4)
RBC # BLD: 3.6 MILL/MM3 (ref 4.2–5.4)
RBC # BLD: 3.93 MILL/MM3 (ref 4.2–5.4)
RESPIRATORY CULTURE: ABNORMAL
RESPIRATORY CULTURE: ABNORMAL
SODIUM BLD-SCNC: 132 MEQ/L (ref 135–145)
SODIUM BLD-SCNC: 133 MEQ/L (ref 135–145)
SODIUM BLD-SCNC: 134 MEQ/L (ref 135–145)
TOTAL PROTEIN: 5.3 G/DL (ref 6.1–8)
TOTAL PROTEIN: 5.3 G/DL (ref 6.1–8)
TOTAL PROTEIN: 5.4 G/DL (ref 6.1–8)
WBC # BLD: 12.2 THOU/MM3 (ref 4.8–10.8)
WBC # BLD: 13.1 THOU/MM3 (ref 4.8–10.8)
WBC # BLD: 16.3 THOU/MM3 (ref 4.8–10.8)
WBC # BLD: 8.7 THOU/MM3 (ref 4.8–10.8)

## 2021-11-09 PROCEDURE — 74176 CT ABD & PELVIS W/O CONTRAST: CPT

## 2021-11-09 PROCEDURE — 94660 CPAP INITIATION&MGMT: CPT

## 2021-11-09 PROCEDURE — 2000000000 HC ICU R&B

## 2021-11-09 PROCEDURE — 94003 VENT MGMT INPAT SUBQ DAY: CPT

## 2021-11-09 PROCEDURE — 90945 DIALYSIS ONE EVALUATION: CPT

## 2021-11-09 PROCEDURE — 6360000002 HC RX W HCPCS: Performed by: INTERNAL MEDICINE

## 2021-11-09 PROCEDURE — 99291 CRITICAL CARE FIRST HOUR: CPT | Performed by: INTERNAL MEDICINE

## 2021-11-09 PROCEDURE — 99232 SBSQ HOSP IP/OBS MODERATE 35: CPT | Performed by: PHYSICIAN ASSISTANT

## 2021-11-09 PROCEDURE — 2580000003 HC RX 258: Performed by: INTERNAL MEDICINE

## 2021-11-09 PROCEDURE — 6360000002 HC RX W HCPCS

## 2021-11-09 PROCEDURE — 2580000003 HC RX 258: Performed by: EMERGENCY MEDICINE

## 2021-11-09 PROCEDURE — 2700000000 HC OXYGEN THERAPY PER DAY

## 2021-11-09 PROCEDURE — 6360000002 HC RX W HCPCS: Performed by: NURSE PRACTITIONER

## 2021-11-09 PROCEDURE — 94761 N-INVAS EAR/PLS OXIMETRY MLT: CPT

## 2021-11-09 PROCEDURE — 6360000002 HC RX W HCPCS: Performed by: STUDENT IN AN ORGANIZED HEALTH CARE EDUCATION/TRAINING PROGRAM

## 2021-11-09 PROCEDURE — 84238 ASSAY NONENDOCRINE RECEPTOR: CPT

## 2021-11-09 PROCEDURE — 36592 COLLECT BLOOD FROM PICC: CPT

## 2021-11-09 PROCEDURE — 6370000000 HC RX 637 (ALT 250 FOR IP): Performed by: FAMILY MEDICINE

## 2021-11-09 PROCEDURE — 6370000000 HC RX 637 (ALT 250 FOR IP): Performed by: NURSE PRACTITIONER

## 2021-11-09 PROCEDURE — 37799 UNLISTED PX VASCULAR SURGERY: CPT

## 2021-11-09 PROCEDURE — C9113 INJ PANTOPRAZOLE SODIUM, VIA: HCPCS | Performed by: NURSE PRACTITIONER

## 2021-11-09 PROCEDURE — 94640 AIRWAY INHALATION TREATMENT: CPT

## 2021-11-09 PROCEDURE — 6370000000 HC RX 637 (ALT 250 FOR IP): Performed by: STUDENT IN AN ORGANIZED HEALTH CARE EDUCATION/TRAINING PROGRAM

## 2021-11-09 PROCEDURE — 51700 IRRIGATION OF BLADDER: CPT

## 2021-11-09 PROCEDURE — 80053 COMPREHEN METABOLIC PANEL: CPT

## 2021-11-09 PROCEDURE — 82330 ASSAY OF CALCIUM: CPT

## 2021-11-09 PROCEDURE — 84100 ASSAY OF PHOSPHORUS: CPT

## 2021-11-09 PROCEDURE — 36415 COLL VENOUS BLD VENIPUNCTURE: CPT

## 2021-11-09 PROCEDURE — 85027 COMPLETE CBC AUTOMATED: CPT

## 2021-11-09 PROCEDURE — 6360000002 HC RX W HCPCS: Performed by: FAMILY MEDICINE

## 2021-11-09 PROCEDURE — 71045 X-RAY EXAM CHEST 1 VIEW: CPT

## 2021-11-09 PROCEDURE — 99233 SBSQ HOSP IP/OBS HIGH 50: CPT | Performed by: INTERNAL MEDICINE

## 2021-11-09 PROCEDURE — 82948 REAGENT STRIP/BLOOD GLUCOSE: CPT

## 2021-11-09 PROCEDURE — 2580000003 HC RX 258: Performed by: STUDENT IN AN ORGANIZED HEALTH CARE EDUCATION/TRAINING PROGRAM

## 2021-11-09 PROCEDURE — 2500000003 HC RX 250 WO HCPCS: Performed by: INTERNAL MEDICINE

## 2021-11-09 PROCEDURE — 2500000003 HC RX 250 WO HCPCS: Performed by: STUDENT IN AN ORGANIZED HEALTH CARE EDUCATION/TRAINING PROGRAM

## 2021-11-09 PROCEDURE — 83735 ASSAY OF MAGNESIUM: CPT

## 2021-11-09 RX ORDER — LORAZEPAM 2 MG/ML
1 INJECTION INTRAMUSCULAR ONCE
Status: COMPLETED | OUTPATIENT
Start: 2021-11-09 | End: 2021-11-09

## 2021-11-09 RX ORDER — LORAZEPAM 2 MG/ML
INJECTION INTRAMUSCULAR
Status: COMPLETED
Start: 2021-11-09 | End: 2021-11-09

## 2021-11-09 RX ADMIN — CEFEPIME HYDROCHLORIDE 2000 MG: 2 INJECTION, POWDER, FOR SOLUTION INTRAVENOUS at 20:56

## 2021-11-09 RX ADMIN — PANTOPRAZOLE SODIUM 40 MG: 40 INJECTION, POWDER, FOR SOLUTION INTRAVENOUS at 08:23

## 2021-11-09 RX ADMIN — VANCOMYCIN HYDROCHLORIDE 1500 MG: 5 INJECTION, POWDER, LYOPHILIZED, FOR SOLUTION INTRAVENOUS at 15:31

## 2021-11-09 RX ADMIN — PHENYLEPHRINE HYDROCHLORIDE 30 MCG/MIN: 10 INJECTION INTRAVENOUS at 15:11

## 2021-11-09 RX ADMIN — ALBUTEROL SULFATE 2.5 MG: 2.5 SOLUTION RESPIRATORY (INHALATION) at 11:54

## 2021-11-09 RX ADMIN — INSULIN LISPRO 1 UNITS: 100 INJECTION, SOLUTION INTRAVENOUS; SUBCUTANEOUS at 12:39

## 2021-11-09 RX ADMIN — SODIUM CHLORIDE 1.4 MCG/KG/HR: 9 INJECTION, SOLUTION INTRAVENOUS at 16:21

## 2021-11-09 RX ADMIN — Medication: at 13:54

## 2021-11-09 RX ADMIN — SODIUM CHLORIDE 1 MCG/KG/HR: 9 INJECTION, SOLUTION INTRAVENOUS at 13:00

## 2021-11-09 RX ADMIN — Medication: at 17:41

## 2021-11-09 RX ADMIN — Medication: at 12:18

## 2021-11-09 RX ADMIN — HEPARIN SODIUM: 1000 INJECTION, SOLUTION INTRAVENOUS; SUBCUTANEOUS at 09:15

## 2021-11-09 RX ADMIN — SODIUM CHLORIDE 1 MCG/KG/HR: 9 INJECTION, SOLUTION INTRAVENOUS at 08:50

## 2021-11-09 RX ADMIN — Medication: at 22:07

## 2021-11-09 RX ADMIN — TIOTROPIUM BROMIDE AND OLODATEROL 2 PUFF: 3.124; 2.736 SPRAY, METERED RESPIRATORY (INHALATION) at 06:33

## 2021-11-09 RX ADMIN — SODIUM CHLORIDE, PRESERVATIVE FREE 10 ML: 5 INJECTION INTRAVENOUS at 08:21

## 2021-11-09 RX ADMIN — LORAZEPAM 1 MG: 2 INJECTION INTRAMUSCULAR at 13:44

## 2021-11-09 RX ADMIN — CEFEPIME HYDROCHLORIDE 2000 MG: 2 INJECTION, POWDER, FOR SOLUTION INTRAVENOUS at 10:17

## 2021-11-09 RX ADMIN — LORAZEPAM 1 MG: 2 INJECTION INTRAMUSCULAR; INTRAVENOUS at 13:44

## 2021-11-09 RX ADMIN — FENTANYL CITRATE 25 MCG: 0.05 INJECTION, SOLUTION INTRAMUSCULAR; INTRAVENOUS at 08:36

## 2021-11-09 RX ADMIN — Medication: at 19:03

## 2021-11-09 RX ADMIN — Medication: at 01:58

## 2021-11-09 RX ADMIN — SODIUM CHLORIDE 1.4 MCG/KG/HR: 9 INJECTION, SOLUTION INTRAVENOUS at 19:08

## 2021-11-09 RX ADMIN — SODIUM CHLORIDE SOLN NEBU 3% 4 ML: 3 NEBU SOLN at 18:11

## 2021-11-09 RX ADMIN — SENNOSIDES 8.6 MG: 8.6 TABLET, COATED ORAL at 08:23

## 2021-11-09 RX ADMIN — ALBUTEROL SULFATE 2.5 MG: 2.5 SOLUTION RESPIRATORY (INHALATION) at 18:10

## 2021-11-09 RX ADMIN — ALBUTEROL SULFATE 2.5 MG: 2.5 SOLUTION RESPIRATORY (INHALATION) at 01:36

## 2021-11-09 RX ADMIN — INSULIN LISPRO 1 UNITS: 100 INJECTION, SOLUTION INTRAVENOUS; SUBCUTANEOUS at 04:40

## 2021-11-09 RX ADMIN — Medication: at 02:21

## 2021-11-09 RX ADMIN — SODIUM CHLORIDE 900 MG: 9 INJECTION, SOLUTION INTRAVENOUS at 20:16

## 2021-11-09 RX ADMIN — Medication: at 12:21

## 2021-11-09 RX ADMIN — POTASSIUM PHOSPHATE, MONOBASIC AND POTASSIUM PHOSPHATE, DIBASIC 6 MMOL: 224; 236 INJECTION, SOLUTION, CONCENTRATE INTRAVENOUS at 15:33

## 2021-11-09 RX ADMIN — SODIUM CHLORIDE 1 MCG/KG/HR: 9 INJECTION, SOLUTION INTRAVENOUS at 04:38

## 2021-11-09 RX ADMIN — ALLOPURINOL 300 MG: 300 TABLET ORAL at 08:23

## 2021-11-09 RX ADMIN — ALBUTEROL SULFATE 2.5 MG: 2.5 SOLUTION RESPIRATORY (INHALATION) at 06:31

## 2021-11-09 RX ADMIN — SODIUM HYPOCHLORITE: 1.25 SOLUTION TOPICAL at 08:21

## 2021-11-09 RX ADMIN — SODIUM CHLORIDE SOLN NEBU 3% 4 ML: 3 NEBU SOLN at 06:31

## 2021-11-09 ASSESSMENT — PULMONARY FUNCTION TESTS
PIF_VALUE: 22
PIF_VALUE: 24
PIF_VALUE: 24
PIF_VALUE: 22

## 2021-11-09 ASSESSMENT — PAIN SCALES - GENERAL
PAINLEVEL_OUTOF10: 6
PAINLEVEL_OUTOF10: 0
PAINLEVEL_OUTOF10: 0

## 2021-11-09 NOTE — PROGRESS NOTES
Patient is sedated and intubated. Nursing staff reports that her left nephrostomy tube is not flushing. IR was consulted to assess patency of her nephrostomy tube. CBI running at 200 ml/hr. Urine in seaman bag with slight pinkish hue. She is on low-dose Benjamin-Synephrine. On CVVH. Vitals:    11/09/21 0602 11/09/21 0631 11/09/21 0632 11/09/21 0644   BP:       Pulse: 66 66 61    Resp: (!) 38 17 (!) 39 16   Temp:       TempSrc:       SpO2: 95% 97% 98% 100%   Weight: 196 lb 3.4 oz (89 kg)      Height:             Intake/Output Summary (Last 24 hours) at 11/9/2021 0759  Last data filed at 11/9/2021 0700  Gross per 24 hour   Intake 3789.71 ml   Output 3549 ml   Net 240.71 ml       Labs  Recent Labs     11/08/21  0827 11/08/21  0827 11/08/21  1400 11/08/21  2030 11/09/21  0215   WBC 13.6*  --   --  10.7 8.7   HGB 7.1*  --   --  10.5* 10.2*   HCT 22.7*  --   --  33.1* 31.8*   PLT 94*  --   --  85* 82*   *   < > 133* 133* 133*   K 3.9   < > 4.0 4.4 4.6      < > 101 100 102   CO2 25   < > 25 24 24   BUN 12   < > 12 15 18   CREATININE 0.6   < > 0.6 0.7 0.7   MG 2.0   < > 2.1 2.2 2.1   PHOS 1.7*   < > 2.0* 2.3* 2.4   CALCIUM 7.9*   < > 8.1* 8.5 8.3*    < > = values in this interval not displayed. Urine culture: No growth (11/2 and 10/27)  Left nephrostomy tube culture: Staph epidermis, MRSE. Very light growth. Radiology  CT abdomen and pelvis  FINDINGS:   Susan Mettle are stable moderate bilateral pleural effusions. There are lung opacities adjacent to the effusions which have mildly worsened in the interval. The heart is prominently enlarged, stable compared to prior exam. There is stable small hyperdense    pericardial collection. There is an enteric tube with tip in the stomach.       The liver is stable in appearance without focal lesion identified. There is a stable rim calcified gallstone in the gallbladder. Adrenal glands are unremarkable.  There has been interval placement of a percutaneous nephrostomy tube on the left with distal    tip coiled in the renal pelvis. The renal pelvis distended with hyperdense material. There are prominent calcifications in the left kidney, stable compared to prior exam. There are small foci of air within the kidney. There is a hypodense focus at the    anterior aspect of the interpolar region of the left kidney with small focus of air at the margin. There is prominent stranding of the fat adjacent to the renal pelvis similar to previous exam. There are scattered calcified granulomas in the spleen which    is otherwise unremarkable. The pancreatic duct is mildly prominent, stable compared to prior exam. There are mildly retroperitoneal lymph nodes adjacent to the aorta at the level of the left kidney, stable compared to prior exam. The largest of these    measures 1.1 cm in greatest short axis diameter. No other retroperitoneal or mesenteric lymphadenopathy is identified.       There is a focal calcification in the cecum, stable compared to prior exam. Large bowel otherwise appears within normal limits. Small bowel appears within normal limits. The bladder is decompressed with Padilla catheter in place. There is moderate free    fluid in the pelvis, similar to prior exam. There is a prominent ulcer in the dependent subcutaneous tissues extending to the coccyx, similar to prior exam. There is diffuse subcutis edema at the abdomen and pelvis and lower extremities, mildly increased    in the interval.           Impression       1. Interval percutaneous left nephrostomy tube placement with small amount of blood in the renal pelvis about the nephrostomy tube. 2. Hypodense lesion in the anterior aspect of the interpolar region of the left kidney with small focus of air at the margin. Developing abscess can't be excluded. 3. Scattered foci of air elsewhere in the kidney likely sequelae of nephrostomy tube placement.    4. Mildly worsening anasarca with persistent small to moderate ascites and moderate pleural effusions which may be cardiogenic given cardiomegaly. 5. Worsening opacities adjacent to the pleural effusions as evidence for worsening atelectasis or infiltrates. 6. Small hemopericardium, stable compared to prior exam.   7. Stable retroperitoneal periaortic lymphadenopathy at the level of the left kidney. Exam  General: Sedated and intubated. Lungs: Diminished bilaterally. No r/r/w. Heart: RRR. S1/S2. Abdomen: Soft. Distended. Left nephrostomy tube with bloody drainage. Hypoactive bowel sounds. : Bilateral labia with significant edema  Extremities: UE and LE with 2+ pitting edema bilaterally      Assessment and Plan  1. ALVIN- Multifactorial (sepsis, left hydronephrosis, contrast). Nephrology consulted. Started CVVH on 11/5/21.    2. Gross Hematuria- CBI running. Left nephrostomy tube unable to be flushed. Consulted IR to assess patency and then resume flushes. Will repeat CT abdomen and pelvis without contrast.     3. Pyelonephritis- Perinephric stranding on CT abdomen and pelvis with a small area concerning for developing abscess in the region of the left nephrostomy tube. Will repeat CT abdomen and pelvis for further delineation. On Vancomycin. 4. Left Staghorn Calculus- Nephrostomy tube place 11/2/21. Will need treated when patient stable. 5. Acute Blood Loss Anemia- Received three units PRBCs on 11/8/21. Heparin stopped. 6. Respiratory Failure secondary to Pneumonia- Intubated. On Vancocin. Complete Rocephin. 7. 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      Discussed with Dr. Jorge Erickson PA-C  11/9/21

## 2021-11-09 NOTE — CARE COORDINATION
11/9/21, 3:46 PM EST    DISCHARGE ON GOING EVALUATION    Javad Woodard day: 13  Location: -09/009-A Reason for admit: Acute respiratory failure ([address] [J96.00]  Flash pulmonary edema ([address] [J81.0]  Acute respiratory failure with hypercapnia ([address] [J96.02]   Procedure:   10/27 CXR: Near complete opacification of the left hemithorax with very little aerated lung visualized in the left upper lobe. Small right pleural effusion and right lower lobe consolidation obscures visualization of the right hemidiaphragm pulmonary vascular congestion is observed  10/27 Intubated  10/27 CVC right subclavian  10/27 IR: Left thoracentesis with 1.1L removed  10/28 EEG: no definitive evidence of epileptiform activity appreciated. 10/29 Cardiac Cath with [facility] and michelle placed - removed 11/4  10/31 CT Abd/pelvis:   1. Bilateral pleural effusions and abnormal densities in the right and left lower lobes consistent with inflammatory process. 2. Cardiomegaly. Small pericardial effusion. 3. Small amount of fluid surrounding the liver. 4. Gallstone. Increased attenuation in the gallbladder. No pericholecystic fluid or biliary dilatation. 5. Large staghorn calculus in the left kidney. Severe left-sided hydronephrosis and hydroureter. Increased density in the left perinephric fat consistent with inflammatory process. 6. Atherosclerotic calcification in the abdominal aorta, iliac and femoral arteries. 7. Padilla catheter within the bladder. 8. Lumbar spondylosis. 9. Increased density in the skin and subcutaneous soft tissues suggestive of edema or anasarca. 10. Enteric tube.  Honaker-Kasia catheter in place, seen better on plain radiographs     11/1 CT Chest: Moderate bilateral pleural effusions have increased in size in the interval with associated adjacent atelectasis; Persistent pulmonary vascular congestion/edema with cardiomegaly; Decreased pericardial effusion  11/1 Left Nephrostomy tube placed in IR  11/3 Cardioverted x1 to NSR  11/4 CT Abd/pelvis:   1. Interval percutaneous left nephrostomy tube placement with small amount of blood in the renal pelvis about the nephrostomy tube. 2. Hypodense lesion in the anterior aspect of the interpolar region of the left kidney with small focus of air at the margin. Developing abscess can't be excluded. 3. Scattered foci of air elsewhere in the kidney likely sequelae of nephrostomy tube placement. 4. Mildly worsening anasarca with persistent small to moderate ascites and moderate pleural effusions which may be cardiogenic given cardiomegaly. 5. Worsening opacities adjacent to the pleural effusions as evidence for worsening atelectasis or infiltrates. 6. Small hemopericardium, stable compared to prior exam.   7. Stable retroperitoneal periaortic lymphadenopathy at the level of the left kidney     11/5 TESSIO left subclavian  11/5 CRRT initiated  11/9 Extubated  11/9 CXR: Progressive left base consolidation. Improved aeration elsewhere    Barriers to Discharge: TESSIO placed and started on CRRT on 11/5. 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 Will need renal biopsy when medically stable. Extubated today to 6L O2; RR 30's and labored breathing post-extubation. Now on bipap. CT abd/pelvis ordered. CRRT continues with  ml/hr. CBI continues. Sats 99% on bipap with 50% FIO2. Afebrile. NSR. Follows commands. Intensivist, Cardiology, GI, Nephrology, and Urology following. Respiratory culture +MRSA and adenovirus by PCR. Telemetry, CVC, TESSIO, michelle, left nephrostomy tube, flexiseal, seaman, SCDs. Precedex @ 1.4 mcg/kg/hr, addie @ 10 mcg/min, nebs, allopurinol, asa, IV cefepime, IV protonix, riociguat, dakins daily, IV vancomycin.   PCP: Ghassan Walden MD  Readmission Risk Score: 16.2 ( )%  Patient Goals/Plan/Treatment Preferences: From home alone and current with [facility]. SW on case. Monitor for possible needs.  Will need PT/OT when appropriate.

## 2021-11-09 NOTE — PLAN OF CARE
Problem: RESPIRATORY  Goal: Normal spontaneous ventilation  Description:      Outcome: Completed     Patient extubated at [id number]. Patient place on 6L nasal cannula.

## 2021-11-09 NOTE — PROGRESS NOTES
days. SSI started for coverage. Not candidate for Rituxin/TPE 2/2 existing infection. 6. Suspected Pyelonephritis: perinephric stranding noted on CT abdomen and pelvis w/ air fluid region discontiguous with region of nephrostomy tube insertion concerning for abscess (<3 cm) vs emphysematous pyelonephritis when discussed with radiologist. Received 5 days empiric therapy w/ cefepime (started 11/5/21). On Vancomycin per above. Culture of nephrostomy tube aspirate from 11/5/21 shows staph epidermidis (vancomycin sensitive). Suspect nephrostomy tube colonization. 7. Hydronephrosis 2/2 Left-sided staghorn calculi: Urology following. Percutaneous drainage tube 11/2/21 IR. Low output following tube placement. Increased flow on CBI per urology recs. Still flushing clots  8. Acute blood loss anemia: Suspect consumptive process. Leading differential , smear, reticulocytes, LDH, Haptoglobin, MERCEDES, B12/folate  pendingsuspect 2/2 hemolysis on CRRT. PRBC x1 transfused 10/31/21, 11/4/21, 11/5/21, x2 11/6/21, x3 11/8/21. Heparin stopped (dialysis dose still running). Holding ASA  9. Thrombocytopenia: suspect consumptive process. Workup per above. Not heparin per above. Platelets 84 14/8/40. 10. Hematuria: s/p perc tube placement. CBI per above. Stopped heparin, holding ASA. 11. Bilateral Pleural Effusion:  2/2 PNA per above with associated volume overload in context of renal failure. Interval enlargement noted on CT 11/1/21. Improvemed in concomitant pericardial effusion  12. Suspected COPD: current every day smoker. Obstructive process noted on flow volume loop on ventilator. Started empiric therapy with Stiolto. Recommend formal PFT once improved  13. PAH/chronic RV failure NYHA II: EF 55 to 60% G2 DD TTE 5/4/2021. RHC showed [facility] with elevated PCWP. Treated w/ Riociguat. 14. Normocytic Anemia: 2/2 hematuria s/p perc tube placement w/ Iron studies consistent w/  MERCEDES vs Anemia chronic disease.  B12 WNL, Folate low, Absreticulocyte index 1.9% Soluable transferrin pending. Calculated Iron deficit 827mg. Will start Venofer replace folate once ABx stopped  15. Cholelithiasis: w/ dilated CBD. Noted on 10/30/2021 US liver/GB. GI following. Does not suspect choledocolithiasis at this time. May consider HIDA scan once stable   16. Stage III decubitus ulcer with inferior tunneling: S/p excision VAC placement 8/21. Low suspicion for active infection  17. Moderate malnutrition: Pre-albumin 14.4 w/ notable cachexia  18. Chronic tobacco use:   cessation  19. Mild AS: Noted on previous TTE not appreciated on repeat imaging  20. Gout:  W/o exacerbation. Continue allopurinol  21. BARBER: non compliant w/ CPAP  22. New onset atrial fibrillation with rapid ventricular response (resolved): now in NSR s/p DCCV 11/3/21 Stopped Heparin per hematuria & and now in NSR. Stopped Amiodarone 11/8/21. Holding metoprolol 2/2 hypotension  23. Hyperkalemia (resolved): 2/2 ALVIN  24. Hyponatremia (resolved): Suspect 2/2 renal failure. Unable to obtain urine Na 2/2 bladder irrigation. Bumex resolved w/ CRRT  25. Nutrition: on TF @30 ml       INITIAL H AND P AND ICU COURSE:  71F admitted [facility] 10/27/21 w/ SOB. Current smoker w/ PMH PAH grp 3, HFpEF, stage III sacral ulcer s/p wound ostomy 9/21. Patient sister reports SpO2 51% at home and was brought to ED where ABG showed pH 7.19, PCO2 80, PO2 134. She required emergent intubation and was transferred to ICU. Workup revealed left sided pleural effusion and underwent US guided thoracentesis w/ 1100 cc removed consistent w/ MRSA/adenovirus. Patient was noted to be in afib/RVR and was placed on amio, heparin drip. Patient was also noted to have normocytic anemia and received 1 unit PRBC 10/31/21. CT abdomen revealed CBD dilation w/ cholelithiasis. 11/1/2021: Plan for nephrostomy tubes today. Hemoglobin 7.7 s/p 1 unit PRBC yesterday. Weaned to 4 mics Levophed.      11/2/2021: Patient resumed back on prior dose of vancomycin. Low urine output w/ increasing pressor requirements today    11/3/2021: Will attempt SBT if able to wean today. Increased Amio drip 2/2 worsening tachycardia. Continued hematuria w/ low urine output. Cr stable. Will evaluate UTI following perc tube placement. Rise in WBC noted. Culture pending    11/4/2021: Cardioversion on 11/3/2021. Now and NSR. CVP 29 this a.m. Suspect drop in CI 2/2 to stopping levophed. Diffuse edema following transfusion overnight. Given one-time dose of 2 mg Bumex and albumin. SBT this AM    11/5/2021: Failed SBT yesterday. Continued low urine output. Trial of Bumex today. Will proceed with dialysis if fails to improve. 11/6/2021: Started on CRRT, continues to have bloody drainage of nephrostomy tube given 1 unit PRBC, cefipime emperically pending cultures of nephrostomy tube fluid and repeat resp cultures. 11/7/21: Patient remains clinically well on exam.  We'll continue medical of UF with CRRT. On empiric cefepime for coverage of perinephric abscess noted on CT abdomen and pelvis November 4, 2021. White count trending down. Will attempt spontaneous breathing trial in a.m.    11/8/21: Transfused 1x PRBC this AM. Anemia workup pending. WBC trending down. Patient ventilator settings this AM preclude SBT. Volume overloaded on exam. UF increased to 125 cc/hr this AM. Will attempt to wean vent settings further further. Coag neg staph growing on nephrostomy tube aspirate. Suspect contamination. Continue existing ABx    11/9/21: Pulse dose steroids initiated overnight. Started on SSI. Urine output now improving 75cc/hr. Transfused 3 units PRBCs yesterday for Improve DO2. MERCEDES noted w/ 875 mg deficit. Will replace once steroids/ABx completed. Past Medical History:  Per HPI. Family History:  DM in father and sister. Social History: chronic smoker.     ROS   Unable to obtain a comprehensive ROS as patient sedated and intubated     Scheduled Meds:   insulin lispro 0-6 Units SubCUTAneous Q4H    phosphorus replacement protocol   Other RX Placeholder    methylPREDNISolone  900 mg IntraVENous Daily    potassium bicarb-citric acid  40 mEq Oral Once    potassium phosphate IVPB  12 mmol IntraVENous Once    sodium chloride (Inhalant)  4 mL Nebulization BID    vancomycin  1,500 mg IntraVENous Q24H    cefepime  2,000 mg IntraVENous Q12H    tiotropium-olodaterol  2 puff Inhalation Daily    [Held by provider] aspirin  81 mg Oral Daily    vancomycin (VANCOCIN) intermittent dosing (placeholder)   Other RX Placeholder    pantoprazole  40 mg IntraVENous QAM    sodium hypochlorite   Irrigation Daily    Riociguat  2.5 mg Oral Q8H    sodium chloride flush  5-40 mL IntraVENous 2 times per day    allopurinol  300 mg Oral Daily    [Held by provider] FLUoxetine  40 mg Oral Daily    [Held by provider] metoprolol tartrate  12.5 mg Oral BID    albuterol  2.5 mg Nebulization Q6H     Continuous Infusions:   sodium chloride      sodium chloride      dextrose      dexmedetomidine 1 mcg/kg/hr (11/09/21 0438)    heparin 5000 units CRRT syringe 1 mL/hr at 11/07/21 2012    bumetanide 0.1 mg/mL infusion 0.5 mg/hr (11/05/21 0904)    prismaSATE BGK 4/2.5 1,000 mL/hr at 11/08/21 1958    prismaSol BGK 4/2.5 1,000 mL/hr at 11/09/21 0221    prismaSol BGK 4/2.5 500 mL/hr at 11/09/21 0158    phenylephrine (KRISTAL-SYNEPHRINE) 250 mg/250 mL infusion 50 mcg/min (11/09/21 0400)    sodium chloride Stopped (11/08/21 2129)    [Held by provider] sodium chloride 50 mL/hr at 10/28/21 2314       PHYSICAL EXAMINATION:  T:  98.6. P: 68 RR:  13. B/P:  115/50  FiO2:  55. O2 Sat:  92%. I/O: 1015/3308 (irrigation)  Body mass index is 43.84 kg/m². GCS:   8  PC: 16 PEEP: 8: TV: 400: RRTotal: 11: General: Acute on chronically ill-appearing elderly white female  HEENT:  normocephalic and atraumatic. No scleral icterus. PERR  Neck: supple. No Thyromegaly.  Left subclavian dialysis catheter   Lungs: Wheezes heard right apex. Diffuse rhonchi appreciated. No retractions  Cardiac: RRR. No JVD. Abdomen: soft. Nontender. ,npehrostomy tube with bloody drainage  Extremities:  +2 pitting edema x4. noted No clubbing, cyanosis   Vasculature: capillary refill < 3 seconds. Palpable dorsalis pedis pulses. Skin:  warm and dry. Psych: Unable to assess as patient is currently sedated and on mechanical ventilation  Lymph:  No supraclavicular adenopathy. Neurologic:  No focal deficit. No seizures. Data: (All radiographs, tracings, PFTs, and imaging are personally viewed and interpreted unless otherwise noted).  CMP : Sodium 133 potassium 4.6 chloride 102 bicarb 24 BUN/creatinine 18/0.7 anion gap 7 ionized calcium 1.2 estimated GFR 83 magnesium 2.1 glucose 158 calcium 8.3 phosphorus 2.4 total protein 5.3 albumin 2.3 alk phos 114 ALT/AST 12/13 total bilirubin 1.0   CBC: WBC 8.7 H&H 10.2/31.8 platelets 82   SARS-CoV-2 NAAT negative on 10/27/2021   Telemetry shows NSR   Chest x-ray 10/31/2021 demonstrated diffuse bilateral patchy opacities with left-sided pleural effusion and cardiomegaly   Respiratory culture  11/5/21 pending   Pneumonia panel collected 10/27/2021 demonstrates staph aureus. MEC-A gene and adenovirus   CT chest on 11/1/2021 demonstrates bilateral pleural effusions with interval enlargement   CXR 11/3/2021 demonstrates worsening pulmonary congestion with bilateral effusions   UA shows bacteria w/ moderate white cells   CT Abdomen and pelvis 11/5/21 showed perinephric stranding with left-sided hypodense nodule with air-fluid level concerning for abscess. Diffuse ascites and anasarca noted.    CXR November 7, 2021 shows bilateral pulmonary edema with worsening right-sided infiltrate   CXR November 9, 2021 shows improvement in bilateral pulmonary edema with worsening left sided infiltrate         Seen with multidisciplinary ICU team.  Meets Continued ICU Level Care Criteria:    [x] Yes   [] No - Transfer Planned to listed location:  [] HOSPITALIST CONTACTED- DR       Electronically signed by Rashida Mondragon DO PGY-2  Patient seen by me. Case discussed with resident physician. Case discussed with family. Awaiting renal biopsy. Patient extubated 11/9/2021. Undergoing CRRT. Continue with antibiotics. Italicized font represents changes to the note made by me. CC time 35 minutes. Time was discontiguous. Time does not include procedures. Time does include my direct assessment of the patient and coordination of care.   Electronically signed by Raysa Hardwick MD on 11/9/2021 at 5:54 PM

## 2021-11-09 NOTE — PROGRESS NOTES
Comprehensive Nutrition Assessment    Type and Reason for Visit:  Reassess (TF check)    Nutrition Recommendations/Plan:   Continue Nepro TF at 30 ml/hr. Free H20 flush per Dr. Dre Aleman weight checks. Varied weight this admit. Pt on CRRT currently. Recommend probiotic. Nutrition Assessment:     Pt. nutritionally stable AEB NPO status, is tolerating TF at this time. At risk for further nutrition compromise r/t admitted with acute respiratory failure, intubated 10/27, AFib, ALVIN, CRRT this admit ,  stage 3 sacral wound,10/31 for  nephrosotmy tube placement, anemia , concern for cholecystitis, TF intolerances this admit and underlying medical condition (CKD, s/p ID of buttock wound 8/6/21,CHF, gout, pulmonary HTN, obesity ).  Nutrition recommendations/interventions as per above. Malnutrition Assessment:  Malnutrition Status: At risk for malnutrition (Comment)    Context:  Acute Illness     Findings of the 6 clinical characteristics of malnutrition:  Energy Intake:  1 - 75% or less of estimated energy requirements for 7 or more days  Weight Loss:   (5.8% weight loss in 3.5 months)     Body Fat Loss:  No significant body fat loss     Muscle Mass Loss:  Unable to assess    Fluid Accumulation:  1 - Mild     Strength:  Not Performed    Estimated Daily Nutrient Needs:  Energy (kcal):  ~1127 kcals (15); Weight Used for Energy Requirements:  Admission (75.1 kg)     Protein (g):  ~59 grams ( 1.4) monitor reanl status; Weight Used for Protein Requirements:  Ideal (42.3 kg)        Fluid (ml/day):  per Dr;    Nutrition Related Findings:     Pt  intubated 10/27, 10/31 nephrostomy tube placed, TF infusing at goal of 30 ml/hr currently, CRRT currently. Per RN pt with rectal tube, nothing in, gave senna & bowel sounds are hypoactive. Per RN pt does follow commands & received  3 PRBC yesterday. MAP 79. Hgb 10.9, WBC 12.2, Na+ 133.  meds include precedex,  prn fentanyl, addie,  prn senekot, prn glycolax, protonix,vancomycin, solumderol, humalog. Wounds:  Stage III (on coccyx per RN)       Current Nutrition Therapies:    Current Tube Feeding (TF) Orders:  · Feeding Route: Orogastric  · Formula: Renal Formula (Nepro)  · Schedule: Continuous (goal 30 ml/hr)  · Additives/Modulars:  (.)  · Water Flushes: per Dr  · Current TF & Flush Orders Provides: at goal of 30 ml/hr  · Goal TF & Flush Orders Provides: 0197 kcals, 58 grams protein, 115 grams CHO, 18 grams fiber, 523 ml free H20 in 720 ml TF/24 hours      Anthropometric Measures:  · Height: 4' 9\" (144.8 cm)  · Current Body Weight: 196 lb 3.4 oz (89 kg) (11/9 +2 edema)   · Admission Body Weight: 165 lb 9.1 oz (75.1 kg) (10/27 +1 edema)    · Usual Body Weight: 175 lb 11.3 oz (79.7 kg) (per EMR 7/16/21)     · BMI: 42.4  · Adjusted Body Weight:  ;  (IBW ~93 #)   · Adjusted BMI:      · BMI Categories: Obese Class 2 (BMI 35.0 -39.9) (on admit)       Nutrition Diagnosis:   · Inadequate oral intake related to impaired respiratory function as evidenced by intubation      Nutrition Interventions:   Food and/or Nutrient Delivery:  Continue Current Tube Feeding  Nutrition Education/Counseling:  Education not appropriate   Coordination of Nutrition Care:  Continue to monitor while inpatient, Interdisciplinary Rounds    Goals:  TF to provide % of nutrient needs while pt is intubated. Nutrition Monitoring and Evaluation:   Behavioral-Environmental Outcomes:  None Identified   Food/Nutrient Intake Outcomes:  Enteral Nutrition Intake/Tolerance  Physical Signs/Symptoms Outcomes:  Biochemical Data, GI Status, Nausea or Vomiting, Fluid Status or Edema, Hemodynamic Status, Nutrition Focused Physical Findings, Skin, Weight     Discharge Planning:     Too soon to determine     Electronically signed by Farideh Diaz RD, LD on 11/9/21 at 11:42 AM EST    Contact: [phone]

## 2021-11-09 NOTE — PROGRESS NOTES
[facility]         Renal Inpatient Follow-Up note         11/9/2021 12:46 PM    Pt Name:   Portia Finer:   1950  Attending:   Lenard Gipson MD    Chief Complaint : Marlo Mcfarlane is a 73 y.o. female being followed by nephrology for ALVIN/CKD    Interval History :   Patient seen and examined by me.  No distress  Intubated cannot assess history or review of systems  Not much urine output  CVVH running well with 100 mL of ultrafiltration per hour  Patient is at 40% FiO2  Still on Benjamin-Synephrine at a low-dose, blood pressure better     Scheduled Medications :    insulin lispro  0-6 Units SubCUTAneous Q4H    phosphorus replacement protocol   Other RX Placeholder    methylPREDNISolone  900 mg IntraVENous Daily    potassium bicarb-citric acid  40 mEq Oral Once    potassium phosphate IVPB  12 mmol IntraVENous Once    sodium chloride (Inhalant)  4 mL Nebulization BID    vancomycin  1,500 mg IntraVENous Q24H    cefepime  2,000 mg IntraVENous Q12H    tiotropium-olodaterol  2 puff Inhalation Daily    [Held by provider] aspirin  81 mg Oral Daily    vancomycin (VANCOCIN) intermittent dosing (placeholder)   Other RX Placeholder    pantoprazole  40 mg IntraVENous QAM    sodium hypochlorite   Irrigation Daily    Riociguat  2.5 mg Oral Q8H    sodium chloride flush  5-40 mL IntraVENous 2 times per day    allopurinol  300 mg Oral Daily    [Held by provider] FLUoxetine  40 mg Oral Daily    [Held by provider] metoprolol tartrate  12.5 mg Oral BID    albuterol  2.5 mg Nebulization Q6H      sodium chloride      sodium chloride      dextrose      dexmedetomidine 1 mcg/kg/hr (11/09/21 0850)    heparin 5000 units CRRT syringe 1 mL/hr at 11/09/21 0915    bumetanide 0.1 mg/mL infusion 0.5 mg/hr (11/05/21 0904)    prismaSATE BGK 4/2.5 1,000 mL/hr at 11/08/21 1958    prismaSol BGK 4/2.5 1,000 mL/hr at 11/09/21 1221    prismaSol BGK 4/2.5 500 mL/hr at 11/09/21 1218    phenylephrine (KRISTAL-SYNEPHRINE) 250 mg/250 mL infusion 30 mcg/min (11/09/21 0930)    sodium chloride Stopped (11/08/21 2129)    [Held by provider] sodium chloride 50 mL/hr at 10/28/21 2314       Vitals :  /60   Pulse 60   Temp 98.6 °F (37 °C)   Resp 27   Ht 4' 9\" (1.448 m)   Wt 196 lb 3.4 oz (89 kg)   SpO2 98%   BMI 42.46 kg/m²     24HR INTAKE/OUTPUT:      Intake/Output Summary (Last 24 hours) at 11/9/2021 1246  Last data filed at 11/9/2021 1230  Gross per 24 hour   Intake 3773.92 ml   Output 3552 ml   Net 221.92 ml     Last 3 weights  Wt Readings from Last 3 Encounters:   11/09/21 196 lb 3.4 oz (89 kg)   10/25/21 164 lb (74.4 kg)   09/29/21 160 lb (72.6 kg)           Physical Exam :  General Appearance:  Well developed. No distress  Mouth/Throat:  Oral mucosa moist.  ET tube in place  Neck:  Supple, no JVD  Lungs:  Breath sounds: clear, diminished  Heart[de-identified]  S1,S2 heard  Abdomen:  Soft, non - tender  Musculoskeletal:  Edema - noted but appears better         Last 3 CBC   Recent Labs     11/08/21  2030 11/09/21  0215 11/09/21  0800   WBC 10.7 8.7 12.2*   RBC 3.71* 3.59* 3.93*   HGB 10.5* 10.2* 10.9*   HCT 33.1* 31.8* 34.6*   PLT 85* 82* 96*     Last 3 CMP  Recent Labs     11/08/21  1400 11/08/21  2030 11/09/21  0215   * 133* 133*   K 4.0 4.4 4.6    100 102   CO2 25 24 24   BUN 12 15 18   CREATININE 0.6 0.7 0.7   CALCIUM 8.1* 8.5 8.3*   LABALBU 2.4* 2.4* 2.3*   BILITOT 0.6 1.0 1.0             ASSESSMENT / Plan   1. Renal -acute kidney injury, multifactorial etiology which includes hypotension from sepsis, IV contrast exposure on 10/29 and now has some hydronephrosis as well . ? Patient's creatinine continued to get worse, but primarily fluid status worsened, did not respond to diuretics so started her on CVVH on 11/5/2021  ? So far tolerating well.   All the renal parameters and electrolytes look well so now its primarily for fluid removal at this point so decreased the therapy fluid to 500/500/250  ? Monitor labs     2. Electrolytes -replace all electrolytes per protocol as needed, mild hyponatremia . 3. Mild acidosis currently resolved while on CVVH  4. Acute hypercapnic respiratory failure currently on a vent  5. Pneumonia with pleural effusion and septic shock  6. Hypotension on pressors wean to MAP greater than 65  7. Hydronephrosis status post nephrostomy tube placement  8. + ANCA, patient has been having some proteinuria as an outpatient and during my last visit have ordered all the serologic work-up , which the patient has done prior to getting admitted to the hospital however the ANCA was not done at that time. She may be having some renal pathology going on , but I doubt it has anything to do with acute renal dysfunction during this admission. However getting steroids. Once clinically stable might consider a renal biopsy. 9. Meds reviewed and discussed with  family at bedside    Dr. Dale Aponte MD, M,D.  [facility].

## 2021-11-10 LAB
ALBUMIN SERPL-MCNC: 2.3 G/DL (ref 3.5–5.1)
ALBUMIN SERPL-MCNC: 2.4 G/DL (ref 3.5–5.1)
ALBUMIN SERPL-MCNC: 2.8 G/DL (ref 3.5–5.1)
ALBUMIN SERPL-MCNC: 3.1 G/DL (ref 3.5–5.1)
ALP BLD-CCNC: 100 U/L (ref 38–126)
ALP BLD-CCNC: 101 U/L (ref 38–126)
ALP BLD-CCNC: 106 U/L (ref 38–126)
ALP BLD-CCNC: 110 U/L (ref 38–126)
ALT SERPL-CCNC: 11 U/L (ref 11–66)
ALT SERPL-CCNC: 11 U/L (ref 11–66)
ALT SERPL-CCNC: 12 U/L (ref 11–66)
ALT SERPL-CCNC: 12 U/L (ref 11–66)
ANAEROBIC CULTURE: ABNORMAL
ANION GAP SERPL CALCULATED.3IONS-SCNC: 12 MEQ/L (ref 8–16)
ANION GAP SERPL CALCULATED.3IONS-SCNC: 8 MEQ/L (ref 8–16)
AST SERPL-CCNC: 11 U/L (ref 5–40)
AST SERPL-CCNC: 11 U/L (ref 5–40)
AST SERPL-CCNC: 12 U/L (ref 5–40)
AST SERPL-CCNC: 12 U/L (ref 5–40)
BILIRUB SERPL-MCNC: 0.7 MG/DL (ref 0.3–1.2)
BILIRUB SERPL-MCNC: 0.9 MG/DL (ref 0.3–1.2)
BILIRUB SERPL-MCNC: 0.9 MG/DL (ref 0.3–1.2)
BILIRUB SERPL-MCNC: 1 MG/DL (ref 0.3–1.2)
BODY FLUID CULTURE, STERILE: ABNORMAL
BODY FLUID CULTURE, STERILE: ABNORMAL
BUN BLDV-MCNC: 21 MG/DL (ref 7–22)
BUN BLDV-MCNC: 22 MG/DL (ref 7–22)
BUN BLDV-MCNC: 24 MG/DL (ref 7–22)
BUN BLDV-MCNC: 25 MG/DL (ref 7–22)
CALCIUM IONIZED: 1.24 MMOL/L (ref 1.12–1.32)
CALCIUM IONIZED: 1.26 MMOL/L (ref 1.12–1.32)
CALCIUM IONIZED: 1.28 MMOL/L (ref 1.12–1.32)
CALCIUM IONIZED: 1.34 MMOL/L (ref 1.12–1.32)
CALCIUM SERPL-MCNC: 8.3 MG/DL (ref 8.5–10.5)
CALCIUM SERPL-MCNC: 8.3 MG/DL (ref 8.5–10.5)
CALCIUM SERPL-MCNC: 8.5 MG/DL (ref 8.5–10.5)
CALCIUM SERPL-MCNC: 8.7 MG/DL (ref 8.5–10.5)
CHLORIDE BLD-SCNC: 101 MEQ/L (ref 98–111)
CHLORIDE BLD-SCNC: 102 MEQ/L (ref 98–111)
CHLORIDE BLD-SCNC: 102 MEQ/L (ref 98–111)
CHLORIDE BLD-SCNC: 103 MEQ/L (ref 98–111)
CO2: 22 MEQ/L (ref 23–33)
CO2: 23 MEQ/L (ref 23–33)
CO2: 23 MEQ/L (ref 23–33)
CO2: 25 MEQ/L (ref 23–33)
CREAT SERPL-MCNC: 0.7 MG/DL (ref 0.4–1.2)
ERYTHROCYTE [DISTWIDTH] IN BLOOD BY AUTOMATED COUNT: 18.8 % (ref 11.5–14.5)
ERYTHROCYTE [DISTWIDTH] IN BLOOD BY AUTOMATED COUNT: 19 % (ref 11.5–14.5)
ERYTHROCYTE [DISTWIDTH] IN BLOOD BY AUTOMATED COUNT: 19.2 % (ref 11.5–14.5)
ERYTHROCYTE [DISTWIDTH] IN BLOOD BY AUTOMATED COUNT: 19.3 % (ref 11.5–14.5)
ERYTHROCYTE [DISTWIDTH] IN BLOOD BY AUTOMATED COUNT: 61 FL (ref 35–45)
ERYTHROCYTE [DISTWIDTH] IN BLOOD BY AUTOMATED COUNT: 61.1 FL (ref 35–45)
ERYTHROCYTE [DISTWIDTH] IN BLOOD BY AUTOMATED COUNT: 61.1 FL (ref 35–45)
ERYTHROCYTE [DISTWIDTH] IN BLOOD BY AUTOMATED COUNT: 62.1 FL (ref 35–45)
GFR SERPL CREATININE-BSD FRML MDRD: 83 ML/MIN/1.73M2
GLUCOSE BLD-MCNC: 116 MG/DL (ref 70–108)
GLUCOSE BLD-MCNC: 123 MG/DL (ref 70–108)
GLUCOSE BLD-MCNC: 125 MG/DL (ref 70–108)
GLUCOSE BLD-MCNC: 125 MG/DL (ref 70–108)
GLUCOSE BLD-MCNC: 134 MG/DL (ref 70–108)
GLUCOSE BLD-MCNC: 138 MG/DL (ref 70–108)
GLUCOSE BLD-MCNC: 153 MG/DL (ref 70–108)
GLUCOSE BLD-MCNC: 156 MG/DL (ref 70–108)
GRAM STAIN RESULT: ABNORMAL
HAPTOGLOBIN: 149 MG/DL (ref 30–200)
HCT VFR BLD CALC: 27.7 % (ref 37–47)
HCT VFR BLD CALC: 28.5 % (ref 37–47)
HCT VFR BLD CALC: 30.3 % (ref 37–47)
HCT VFR BLD CALC: 31.1 % (ref 37–47)
HEMOGLOBIN: 8.7 GM/DL (ref 12–16)
HEMOGLOBIN: 8.9 GM/DL (ref 12–16)
HEMOGLOBIN: 9.4 GM/DL (ref 12–16)
HEMOGLOBIN: 9.8 GM/DL (ref 12–16)
MAGNESIUM: 2.2 MG/DL (ref 1.6–2.4)
MAGNESIUM: 2.2 MG/DL (ref 1.6–2.4)
MAGNESIUM: 2.3 MG/DL (ref 1.6–2.4)
MAGNESIUM: 2.3 MG/DL (ref 1.6–2.4)
MCH RBC QN AUTO: 28 PG (ref 26–33)
MCH RBC QN AUTO: 28.1 PG (ref 26–33)
MCH RBC QN AUTO: 28.1 PG (ref 26–33)
MCH RBC QN AUTO: 28.2 PG (ref 26–33)
MCHC RBC AUTO-ENTMCNC: 31 GM/DL (ref 32.2–35.5)
MCHC RBC AUTO-ENTMCNC: 31.2 GM/DL (ref 32.2–35.5)
MCHC RBC AUTO-ENTMCNC: 31.4 GM/DL (ref 32.2–35.5)
MCHC RBC AUTO-ENTMCNC: 31.5 GM/DL (ref 32.2–35.5)
MCV RBC AUTO: 88.9 FL (ref 81–99)
MCV RBC AUTO: 89.9 FL (ref 81–99)
MCV RBC AUTO: 89.9 FL (ref 81–99)
MCV RBC AUTO: 90.4 FL (ref 81–99)
ORGANISM: ABNORMAL
PHOSPHORUS: 2.4 MG/DL (ref 2.4–4.7)
PHOSPHORUS: 2.6 MG/DL (ref 2.4–4.7)
PHOSPHORUS: 2.6 MG/DL (ref 2.4–4.7)
PHOSPHORUS: 2.7 MG/DL (ref 2.4–4.7)
PLATELET # BLD: 81 THOU/MM3 (ref 130–400)
PLATELET # BLD: 86 THOU/MM3 (ref 130–400)
PLATELET # BLD: 87 THOU/MM3 (ref 130–400)
PLATELET # BLD: 88 THOU/MM3 (ref 130–400)
PMV BLD AUTO: 10.4 FL (ref 9.4–12.4)
PMV BLD AUTO: 10.5 FL (ref 9.4–12.4)
PMV BLD AUTO: 10.6 FL (ref 9.4–12.4)
PMV BLD AUTO: 9.9 FL (ref 9.4–12.4)
POTASSIUM REFLEX MAGNESIUM: 4.2 MEQ/L (ref 3.5–5.2)
POTASSIUM REFLEX MAGNESIUM: 4.4 MEQ/L (ref 3.5–5.2)
POTASSIUM REFLEX MAGNESIUM: 4.5 MEQ/L (ref 3.5–5.2)
POTASSIUM REFLEX MAGNESIUM: 4.5 MEQ/L (ref 3.5–5.2)
RBC # BLD: 3.08 MILL/MM3 (ref 4.2–5.4)
RBC # BLD: 3.17 MILL/MM3 (ref 4.2–5.4)
RBC # BLD: 3.35 MILL/MM3 (ref 4.2–5.4)
RBC # BLD: 3.5 MILL/MM3 (ref 4.2–5.4)
SODIUM BLD-SCNC: 133 MEQ/L (ref 135–145)
SODIUM BLD-SCNC: 134 MEQ/L (ref 135–145)
SODIUM BLD-SCNC: 134 MEQ/L (ref 135–145)
SODIUM BLD-SCNC: 136 MEQ/L (ref 135–145)
TOTAL PROTEIN: 4.8 G/DL (ref 6.1–8)
TOTAL PROTEIN: 5.1 G/DL (ref 6.1–8)
TOTAL PROTEIN: 5.2 G/DL (ref 6.1–8)
TOTAL PROTEIN: 5.3 G/DL (ref 6.1–8)
WBC # BLD: 16.7 THOU/MM3 (ref 4.8–10.8)
WBC # BLD: 17.4 THOU/MM3 (ref 4.8–10.8)
WBC # BLD: 17.6 THOU/MM3 (ref 4.8–10.8)
WBC # BLD: 18.6 THOU/MM3 (ref 4.8–10.8)

## 2021-11-10 PROCEDURE — 2700000000 HC OXYGEN THERAPY PER DAY

## 2021-11-10 PROCEDURE — 2580000003 HC RX 258: Performed by: STUDENT IN AN ORGANIZED HEALTH CARE EDUCATION/TRAINING PROGRAM

## 2021-11-10 PROCEDURE — P9047 ALBUMIN (HUMAN), 25%, 50ML: HCPCS | Performed by: STUDENT IN AN ORGANIZED HEALTH CARE EDUCATION/TRAINING PROGRAM

## 2021-11-10 PROCEDURE — C9113 INJ PANTOPRAZOLE SODIUM, VIA: HCPCS | Performed by: NURSE PRACTITIONER

## 2021-11-10 PROCEDURE — 83735 ASSAY OF MAGNESIUM: CPT

## 2021-11-10 PROCEDURE — 85027 COMPLETE CBC AUTOMATED: CPT

## 2021-11-10 PROCEDURE — 99291 CRITICAL CARE FIRST HOUR: CPT | Performed by: INTERNAL MEDICINE

## 2021-11-10 PROCEDURE — 94660 CPAP INITIATION&MGMT: CPT

## 2021-11-10 PROCEDURE — 94761 N-INVAS EAR/PLS OXIMETRY MLT: CPT

## 2021-11-10 PROCEDURE — 2580000003 HC RX 258: Performed by: INTERNAL MEDICINE

## 2021-11-10 PROCEDURE — 80053 COMPREHEN METABOLIC PANEL: CPT

## 2021-11-10 PROCEDURE — 36592 COLLECT BLOOD FROM PICC: CPT

## 2021-11-10 PROCEDURE — 99232 SBSQ HOSP IP/OBS MODERATE 35: CPT | Performed by: NURSE PRACTITIONER

## 2021-11-10 PROCEDURE — 6360000002 HC RX W HCPCS: Performed by: INTERNAL MEDICINE

## 2021-11-10 PROCEDURE — 2500000003 HC RX 250 WO HCPCS: Performed by: STUDENT IN AN ORGANIZED HEALTH CARE EDUCATION/TRAINING PROGRAM

## 2021-11-10 PROCEDURE — 99233 SBSQ HOSP IP/OBS HIGH 50: CPT | Performed by: INTERNAL MEDICINE

## 2021-11-10 PROCEDURE — 84100 ASSAY OF PHOSPHORUS: CPT

## 2021-11-10 PROCEDURE — 6360000002 HC RX W HCPCS: Performed by: STUDENT IN AN ORGANIZED HEALTH CARE EDUCATION/TRAINING PROGRAM

## 2021-11-10 PROCEDURE — 94640 AIRWAY INHALATION TREATMENT: CPT

## 2021-11-10 PROCEDURE — 6360000002 HC RX W HCPCS: Performed by: FAMILY MEDICINE

## 2021-11-10 PROCEDURE — 92610 EVALUATE SWALLOWING FUNCTION: CPT

## 2021-11-10 PROCEDURE — 2500000003 HC RX 250 WO HCPCS: Performed by: INTERNAL MEDICINE

## 2021-11-10 PROCEDURE — 36415 COLL VENOUS BLD VENIPUNCTURE: CPT

## 2021-11-10 PROCEDURE — 2580000003 HC RX 258: Performed by: EMERGENCY MEDICINE

## 2021-11-10 PROCEDURE — 82330 ASSAY OF CALCIUM: CPT

## 2021-11-10 PROCEDURE — 6360000002 HC RX W HCPCS: Performed by: NURSE PRACTITIONER

## 2021-11-10 PROCEDURE — 2000000000 HC ICU R&B

## 2021-11-10 PROCEDURE — 82948 REAGENT STRIP/BLOOD GLUCOSE: CPT

## 2021-11-10 RX ORDER — ALBUMIN (HUMAN) 12.5 G/50ML
25 SOLUTION INTRAVENOUS EVERY 6 HOURS
Status: COMPLETED | OUTPATIENT
Start: 2021-11-10 | End: 2021-11-11

## 2021-11-10 RX ADMIN — VANCOMYCIN HYDROCHLORIDE 1500 MG: 5 INJECTION, POWDER, LYOPHILIZED, FOR SOLUTION INTRAVENOUS at 16:22

## 2021-11-10 RX ADMIN — PANTOPRAZOLE SODIUM 40 MG: 40 INJECTION, POWDER, FOR SOLUTION INTRAVENOUS at 09:11

## 2021-11-10 RX ADMIN — SODIUM CHLORIDE 1 MCG/KG/HR: 9 INJECTION, SOLUTION INTRAVENOUS at 02:23

## 2021-11-10 RX ADMIN — ALBUTEROL SULFATE 2.5 MG: 2.5 SOLUTION RESPIRATORY (INHALATION) at 00:48

## 2021-11-10 RX ADMIN — SODIUM CHLORIDE SOLN NEBU 3% 4 ML: 3 NEBU SOLN at 18:38

## 2021-11-10 RX ADMIN — SODIUM CHLORIDE, PRESERVATIVE FREE 10 ML: 5 INJECTION INTRAVENOUS at 20:21

## 2021-11-10 RX ADMIN — ALBUMIN (HUMAN) 25 G: 0.25 INJECTION, SOLUTION INTRAVENOUS at 09:01

## 2021-11-10 RX ADMIN — TIOTROPIUM BROMIDE AND OLODATEROL 2 PUFF: 3.124; 2.736 SPRAY, METERED RESPIRATORY (INHALATION) at 07:53

## 2021-11-10 RX ADMIN — SODIUM CHLORIDE 0.7 MCG/KG/HR: 9 INJECTION, SOLUTION INTRAVENOUS at 16:26

## 2021-11-10 RX ADMIN — SODIUM HYPOCHLORITE: 1.25 SOLUTION TOPICAL at 09:12

## 2021-11-10 RX ADMIN — ALBUTEROL SULFATE 2.5 MG: 2.5 SOLUTION RESPIRATORY (INHALATION) at 18:38

## 2021-11-10 RX ADMIN — ALBUMIN (HUMAN) 25 G: 0.25 INJECTION, SOLUTION INTRAVENOUS at 20:27

## 2021-11-10 RX ADMIN — CEFEPIME HYDROCHLORIDE 2000 MG: 2 INJECTION, POWDER, FOR SOLUTION INTRAVENOUS at 10:04

## 2021-11-10 RX ADMIN — Medication: at 20:17

## 2021-11-10 RX ADMIN — Medication: at 10:18

## 2021-11-10 RX ADMIN — SODIUM CHLORIDE SOLN NEBU 3% 4 ML: 3 NEBU SOLN at 06:56

## 2021-11-10 RX ADMIN — ALBUTEROL SULFATE 2.5 MG: 2.5 SOLUTION RESPIRATORY (INHALATION) at 12:23

## 2021-11-10 RX ADMIN — PHENYLEPHRINE HYDROCHLORIDE 50 MCG/MIN: 10 INJECTION INTRAVENOUS at 21:53

## 2021-11-10 RX ADMIN — ALBUMIN (HUMAN) 25 G: 0.25 INJECTION, SOLUTION INTRAVENOUS at 15:06

## 2021-11-10 RX ADMIN — INSULIN LISPRO 1 UNITS: 100 INJECTION, SOLUTION INTRAVENOUS; SUBCUTANEOUS at 19:48

## 2021-11-10 RX ADMIN — SODIUM CHLORIDE 1.4 MCG/KG/HR: 9 INJECTION, SOLUTION INTRAVENOUS at 00:09

## 2021-11-10 RX ADMIN — HEPARIN SODIUM: 1000 INJECTION, SOLUTION INTRAVENOUS; SUBCUTANEOUS at 07:24

## 2021-11-10 RX ADMIN — SODIUM CHLORIDE 0.6 MCG/KG/HR: 9 INJECTION, SOLUTION INTRAVENOUS at 23:59

## 2021-11-10 RX ADMIN — SODIUM CHLORIDE, PRESERVATIVE FREE 10 ML: 5 INJECTION INTRAVENOUS at 09:03

## 2021-11-10 RX ADMIN — SODIUM CHLORIDE 1.2 MCG/KG/HR: 9 INJECTION, SOLUTION INTRAVENOUS at 09:53

## 2021-11-10 RX ADMIN — Medication: at 13:44

## 2021-11-10 RX ADMIN — CEFEPIME HYDROCHLORIDE 2000 MG: 2 INJECTION, POWDER, FOR SOLUTION INTRAVENOUS at 22:04

## 2021-11-10 RX ADMIN — ALBUTEROL SULFATE 2.5 MG: 2.5 SOLUTION RESPIRATORY (INHALATION) at 06:56

## 2021-11-10 RX ADMIN — POTASSIUM PHOSPHATE, MONOBASIC AND POTASSIUM PHOSPHATE, DIBASIC 6 MMOL: 224; 236 INJECTION, SOLUTION, CONCENTRATE INTRAVENOUS at 22:59

## 2021-11-10 RX ADMIN — Medication: at 03:25

## 2021-11-10 RX ADMIN — SODIUM CHLORIDE 1.4 MCG/KG/HR: 9 INJECTION, SOLUTION INTRAVENOUS at 06:04

## 2021-11-10 RX ADMIN — ALBUTEROL SULFATE 2.5 MG: 2.5 SOLUTION RESPIRATORY (INHALATION) at 23:36

## 2021-11-10 RX ADMIN — Medication: at 13:43

## 2021-11-10 RX ADMIN — INSULIN LISPRO 1 UNITS: 100 INJECTION, SOLUTION INTRAVENOUS; SUBCUTANEOUS at 01:13

## 2021-11-10 RX ADMIN — SODIUM CHLORIDE 900 MG: 9 INJECTION, SOLUTION INTRAVENOUS at 19:53

## 2021-11-10 ASSESSMENT — PAIN SCALES - GENERAL: PAINLEVEL_OUTOF10: 0

## 2021-11-10 NOTE — PROGRESS NOTES
[facility] THERAPY MISSED TREATMENT NOTE  [facility] ICU 4D      Date: 11/10/2021  Patient Name: Mohini Abad        MRN: [id number]    : 1950  (73 y.o.)    REASON FOR MISSED TREATMENT:  Attempted to see patient at 1613 for completion of clinical swallow evaluation. Per chart review, intubation required on 10/27 with extubation completed . At time of attempt, NOAM Owen Abt reporting patient with needs to maintain BiPAP with needs for increased sedation. Will f/u later at date (schedule permitting) to determine readiness to proceed with assessment dependent upon respiratory status+alertness levels.        Sachin So M.A., [address] [place] [address]

## 2021-11-10 NOTE — PROGRESS NOTES
[address] ICU 4D  Clinical Swallow Evaluation      SLP Individual Minutes  Time In: 2361  Time Out: 4960  Minutes: 10  Timed Code Treatment Minutes: 0 Minutes       Date: 11/10/2021  Patient Name: Alireza Cueva      CSN: [id number]   : 1950  (73 y.o.)  Gender: female   Referring Physician:  Aristides Valentine DO  Diagnosis: Acute respiratory failure  Secondary Diagnosis: Dysphagia     History of Present Illness/Injury: Patient admitted to [facility] with above med dx; please refer to physician H&P for full details. Per chart review, patient with complicated medical course s/t admitting dx with intubation required 10/27, extubation completed . ST consulted to complete clinical swallow evaluation to assist with development of dysphagia management POC. Past Medical History:   Diagnosis Date    CHF (congestive heart failure) ([facility])     Dr. Lani Davis Depression     Gout attack     Hypertension     Osteoarthritis     Pulmonary artery hypertension ([facility] [address]     [facility]-- Dr. Azam Myles-- Dr. Lani Davis Renal calculi     Dr. Mynor [facility])        SUBJECTIVE:  NOAM Pedroza with approval to proceed with evaluation. Upon arrival, patient resting in bed with lethargy noted; required sternal rub to improve alertness. Brother and sister at bedside reporting baseline diet of regular solids+thin liquids in home environment. OBJECTIVE:    Pain:  No pain reported. Current Diet: NPO    Respiratory Status:   HHFNC, 40 LPM, 60% FiO2    Behavioral Observation:  Alert and Lethargic    Oral Mechanism Evaluation:      Facial / Labial Impaired Decreased tone; generalized weakness   Lingual Impaired Reduced ROM, strength, coordination     Dry dorsal body mucosa   Dentition WFL    Velum WFL    Vocal Quality No verbalizations produced within assessment; will require further dynamic monitoring s/t length of intubation     [facility] [place]    Cough Not Tested      Patient Evaluated Using:  Ice chip x1    Oral Phase:  Impaired:  Impaired AP Movement, Impaired Mastication and Impaired Oral Initiation    Pharyngeal Phase: Impaired:  Decreased Hyolaryngeal Elevation   *not able to fully validate presence of pharyngeal phase deficits without formal instrumentation/supportive imaging     Signs and Symptoms of Laryngeal Penetration/Aspiration: No signs/symptoms of aspiration evident in this evaluation, but cannot rule out silent aspiration. Impresssions: Patient presents with moderate oral dysphagia with inability to fully discern potential presence of pharyngeal phase deficits without formal instrumentation; concerns for some degree of pharyngeal deficits s/t prolonged intubation status resulting in disuse atrophy of the swallowing musculature. Oral initiation skills impaired with tactile cues required to engage in labial approximation of utensil and for extraction of bolus. Latent attempts to manipulate bolus with further delays for mastication. Concerns for some degree of decreased hyolaryngeal elevation upon tactile palpitation; however, not able to confirm without supportive imaging. Despite no overt s/s aspiration exhibited within conservative trial, further trials deferred given variable RR presenting (27-42 per bedside cardiac monitor). Patient maintains to be at [facility] for potential airway invasion events at this time. Recommend continuation of strict NPO diet level, medications via IV source as applicable. Will prioritize dysphagia interventions on subsequent date to assist with determining readiness to proceed with PO diet level vs needs for instrumental evaluation. *post evaluation, patient without respiratory distress upon leaving room; NOAM Page notified re: clinical findings and recommendations from the assessment; verbal receptiveness noted     RECOMMENDATIONS/ASSESSMENT:  Instrumental Evaluation: Instrumental evaluation not indicated at this time.   Diet Recommendations:  Strict NPO  *medications via IV source as applicable  Strategies:  Recommend NPO, Oral Care Q2H   Rehabilitation Potential: good    EDUCATION:  Learner: Patient and Family  Education:  Reviewed results and recommendations of this evaluation, Reviewed diet and strategies, Reviewed signs, symptoms and risks of aspiration, Reviewed ST goals and Plan of Care and Reviewed recommendations for follow-up  Evaluation of Education: Verbalizes understanding, Demonstrates with assistance and Needs further instruction    PLAN:  Skilled SLP intervention on acute care 3-5 x per week or until goals met and/or pt plateaus in function. Specific interventions for next session may include: dysphagia management. PATIENT GOAL:    Did not state. Will further assess during treatment. SHORT TERM GOALS:  Short-term Goals  Timeframe for Short-term Goals: 2 weeks  Goal 1: Patient will consume PO trials of ice chips, thin liquids, and purees (as deemed clinically indicated) demonstrating consistency for pharyngeal swallow trigger, endurance, and stable RR to determine readiness for PO diet level initiation vs completion of instrumental evaluation. Goal 2: Staff will exhibit return demonstration for completion of oral care to assist with maximizing oral integrity and to reduce potential bacteria colonization.     LONG TERM GOALS:  No LTG established given short NICHOLAS Marx M.A., [address]

## 2021-11-10 NOTE — PROGRESS NOTES
[facility]         Renal Inpatient Follow-Up note         11/10/2021 12:43 PM    Pt Name:   Vanesa Cheng:   1950  Attending:   Wesley Dotson MD    Chief Complaint : Sandra Wilson is a 73 y.o. female being followed by nephrology for ALVIN/CKD    Interval History :   Patient seen and examined by me. No distress  Intubated cannot assess history or review of systems  Having some urine output .   CVVH running well with 100 mL of ultrafiltration per hour  Patient is at 40% FiO2  Still on Benjamin-Synephrine at a low-dose, blood pressure better     Scheduled Medications :    albumin human  25 g IntraVENous Q6H    insulin lispro  0-6 Units SubCUTAneous Q4H    phosphorus replacement protocol   Other RX Placeholder    methylPREDNISolone  900 mg IntraVENous Daily    potassium bicarb-citric acid  40 mEq Oral Once    sodium chloride (Inhalant)  4 mL Nebulization BID    vancomycin  1,500 mg IntraVENous Q24H    cefepime  2,000 mg IntraVENous Q12H    tiotropium-olodaterol  2 puff Inhalation Daily    [Held by provider] aspirin  81 mg Oral Daily    vancomycin (VANCOCIN) intermittent dosing (placeholder)   Other RX Placeholder    pantoprazole  40 mg IntraVENous QAM    sodium hypochlorite   Irrigation Daily    Riociguat  2.5 mg Oral Q8H    sodium chloride flush  5-40 mL IntraVENous 2 times per day    allopurinol  300 mg Oral Daily    [Held by provider] FLUoxetine  40 mg Oral Daily    [Held by provider] metoprolol tartrate  12.5 mg Oral BID    albuterol  2.5 mg Nebulization Q6H      phenylephrine (BENJAMIN-SYNEPHRINE) 50mg/250mL infusion 20 mcg/min (11/10/21 1200)    sodium chloride      dextrose      dexmedetomidine 1 mcg/kg/hr (11/10/21 1200)    heparin 5000 units CRRT syringe 1 mL/hr at 11/09/21 0915    bumetanide 0.1 mg/mL infusion 0.5 mg/hr (11/05/21 0904)    prismaSATE BGK 4/2.5 250 mL/hr at 11/09/21 1932    prismaSol BGK 4/2.5 500 mL/hr at 11/10/21 0325    prismaSol BGK 4/2.5 500 mL/hr at 11/10/21 1018    sodium chloride Stopped (11/08/21 2129)    [Held by provider] sodium chloride 50 mL/hr at 10/28/21 2314       Vitals :  /70   Pulse 71   Temp 98.2 °F (36.8 °C) (Axillary)   Resp 22   Ht 4' 9\" (1.448 m)   Wt 194 lb 10.7 oz (88.3 kg)   SpO2 98%   BMI 42.13 kg/m²     24HR INTAKE/OUTPUT:      Intake/Output Summary (Last 24 hours) at 11/10/2021 1243  Last data filed at 11/10/2021 1200  Gross per 24 hour   Intake 2456.11 ml   Output 3462 ml   Net -1005.89 ml     Last 3 weights  Wt Readings from Last 3 Encounters:   11/10/21 194 lb 10.7 oz (88.3 kg)   10/25/21 164 lb (74.4 kg)   09/29/21 160 lb (72.6 kg)           Physical Exam :  General Appearance:  Well developed. No distress  Mouth/Throat:  Oral mucosa moist.  ET tube in place  Neck:  Supple, no JVD  Lungs:  Breath sounds: clear, diminished  Heart[de-identified]  S1,S2 heard  Abdomen:  Soft, non - tender  Musculoskeletal:  Edema - noted but appears better         Last 3 CBC   Recent Labs     11/10/21  0015 11/10/21  0525 11/10/21  1207   WBC 16.7* 17.4* 17.6*   RBC 3.50* 3.35* 3.17*   HGB 9.8* 9.4* 8.9*   HCT 31.1* 30.3* 28.5*   PLT 88* 81* 87*     Last 3 CMP  Recent Labs     11/09/21  1800 11/10/21  0015 11/10/21  0525   * 133* 134*   K 4.5 4.4 4.5    102 103   CO2 24 23 23   BUN 19 21 22   CREATININE 0.7 0.7 0.7   CALCIUM 8.5 8.3* 8.3*   LABALBU 2.5* 2.4* 2.3*   BILITOT 0.7 0.9 0.7             ASSESSMENT / Plan   1. Renal -acute kidney injury, multifactorial etiology which includes hypotension from sepsis, IV contrast exposure on 10/29 and has some hydronephrosis as well . ? Patient's creatinine continued to get worse, but primarily fluid status worsened, did not respond to diuretics so started her on CVVH on 11/5/2021  ? So far tolerating well. All the renal parameters and electrolytes look well so now its primarily for fluid removal at this point so decreased the therapy fluid to 500/500/250. Doing well. Continue for now. ? Monitor labs .     2. Electrolytes -replace all electrolytes per protocol as needed, mild hyponatremia . 3. Mild acidosis stable on CVVH  4. Acute hypercapnic respiratory failure currently on a vent  5. Pneumonia with pleural effusion and septic shock  6. Hypotension on pressors wean to MAP greater than 65  7. Hydronephrosis status post nephrostomy tube placement  8. + ANCA, patient has been having some proteinuria as an outpatient and during my last visit have ordered all the serologic work-up , which the patient has done prior to getting admitted to the hospital however the ANCA was not done at that time. She may be having some renal pathology going on , but I doubt it has anything to do with acute renal dysfunction during this admission. However getting steroids. Once clinically stable might consider a renal biopsy. 9. Meds reviewed and discussed with RN    Dr. Cathryn Lal MD, M,D.  [facility].

## 2021-11-10 NOTE — PROGRESS NOTES
Urology Progress Note    Chief Complaint: SOB  Reason for consultation:  Kidney stone    Subjective:     Pt extubated . Follows commands. Denies pain. Weak cough and voice. Anasarca. CBI with clear yellow urine. Nephrostomy with bloody drainage. Nursing reports flushing easily. Weaning addie. On CVVH. Anticoag on hold currently. Vitals:  BP (!) 119/49   Pulse 67   Temp 98.2 °F (36.8 °C) (Axillary)   Resp 25   Ht 4' 9\" (1.448 m)   Wt 194 lb 10.7 oz (88.3 kg)   SpO2 96%   BMI 42.13 kg/m²   Temp  Av.2 °F (36.8 °C)  Min: 98.1 °F (36.7 °C)  Max: 98.3 °F (36.8 °C)    Intake/Output Summary (Last 24 hours) at 11/10/2021 1630  Last data filed at 11/10/2021 1600  Gross per 24 hour   Intake 2212.27 ml   Output 3556 ml   Net -1343.73 ml       Social History     Socioeconomic History    Marital status: Single     Spouse name: Not on file    Number of children: 0    Years of education: Not on file    Highest education level: Not on file   Occupational History    Not on file   Tobacco Use    Smoking status: Never Smoker    Smokeless tobacco: Never Used   Vaping Use    Vaping Use: Never used   Substance and Sexual Activity    Alcohol use: No    Drug use: No    Sexual activity: Not Currently   Other Topics Concern    Not on file   Social History Narrative    Not on file     Social Determinants of Health     Financial Resource Strain: Low Risk     Difficulty of Paying Living Expenses: Not very hard   Food Insecurity: No Food Insecurity    Worried About Running Out of Food in the Last Year: Never true    Traci of Food in the Last Year: Never true   Transportation Needs:     Lack of Transportation (Medical): Not on file    Lack of Transportation (Non-Medical):  Not on file   Physical Activity:     Days of Exercise per Week: Not on file    Minutes of Exercise per Session: Not on file   Stress:     Feeling of Stress : Not on file   Social Connections:     Frequency of Communication with Friends and Family: Not on file    Frequency of Social Gatherings with Friends and Family: Not on file    Attends Orthodoxy Services: Not on file    Active Member of Clubs or Organizations: Not on file    Attends Club or Organization Meetings: Not on file    Marital Status: Not on file   Intimate Partner Violence:     Fear of Current or Ex-Partner: Not on file    Emotionally Abused: Not on file    Physically Abused: Not on file    Sexually Abused: Not on file   Housing Stability:     Unable to Pay for Housing in the Last Year: Not on file    Number of Places Lived in the Last Year: Not on file    Unstable Housing in the Last Year: Not on file     Family History   Problem Relation Age of Onset    Diabetes Father     Arthritis Mother     COPD Mother     Diabetes Sister     Heart Disease Maternal Uncle     Breast Cancer Niece 36    Sleep Apnea Brother     Asthma Neg Hx     Birth Defects Neg Hx     Cancer Neg Hx     Depression Neg Hx     Early Death Neg Hx     Hearing Loss Neg Hx     High Blood Pressure Neg Hx     High Cholesterol Neg Hx     Kidney Disease Neg Hx     Learning Disabilities Neg Hx     Mental Illness Neg Hx     Mental Retardation Neg Hx     Miscarriages / Stillbirths Neg Hx     Stroke Neg Hx     Substance Abuse Neg Hx     Vision Loss Neg Hx     Other Neg Hx      No Known Allergies      Constitutional: Alert and follows commands, ill appearing. Anasarca  HEENT:   Head:         Normocephalic and atraumatic. Mucous membranes are normal.   Eyes:         EOM are normal. No scleral icterus. Nose:    The external appearance of the nose is normal  Ears: The ears appear normal to external inspection. Cardiovascular:       Normal rate, regular rhythm. Pulmonary/Chest:  Rhonchi, weak cough, on NC  Abdominal:          Soft. Large. No tenderness. Hypoactive bowel sounds. Genitalia:    CBI with clear yellow urine.   Labial edema  Musculoskeletal: Normal range of motion. Exhibits no edema or tenderness of lower extremities. Extremities:   Pitting edema  Neurological:    Alert     Labs:  WBC:    Lab Results   Component Value Date    WBC 17.6 11/10/2021     Hemoglobin/Hematocrit:    Lab Results   Component Value Date    HGB 8.9 11/10/2021    HCT 28.5 11/10/2021     BMP:    Lab Results   Component Value Date     11/10/2021    K 4.5 11/10/2021     11/10/2021    CO2 25 11/10/2021    BUN 25 11/10/2021    LABALBU 2.8 11/10/2021    CREATININE 0.7 11/10/2021    CALCIUM 8.5 11/10/2021    LABGLOM 83 11/10/2021     information found for this patient  Narrative   PROCEDURE: CT ABDOMEN PELVIS WO CONTRAST       CLINICAL INFORMATION: evaluate for left perinephric abscess .           TECHNIQUE: 2-D multiplanar noncontrast CT abdomen pelvis   All CT scans at this facility use dose modulation, iterative reconstruction, and/or weight-based dosing when appropriate to reduce radiation dose to as low as reasonably achievable.       COMPARISON: 11/4/2021           FINDINGS: Limitations:   Solid organ hollow viscera evaluation limited without contrast.   Examination is further limited by extensive motion artifact and streak artifact. Lung bases   Moderate to large pleural effusions and bilateral groundglass infiltrates. Cardiomegaly. Extensive coronary artery calcifications.       Abdomen pelvis   There is a nephrostomy tube present in the left renal pelvis. There are renal calculi. Motion artifact distorts the image. Perinephric abscess is not definitively identified. Liver is unremarkable. There are gallstones. Right kidney is unremarkable. Spleen is normal. Pancreas appears unremarkable. No evidence of bowel obstruction is liquid stool in the colon and rectum indicating presence of diarrhea. Padilla catheter is present in the bladder bladder is mildly distended. There is marked subcutaneous edema throughout the abdomen and pelvis.       Impression   1.  Very limited evaluation. Large bilateral pleural effusions and bilateral airspace infiltrates. #2 subcutaneous edema throughout the abdomen and pelvis indicating presence of third spacing. 3. Presence or absence of a perinephric abscess cannot be confirmed.               Assessment and Plan  1. ALVIN- Multifactorial (sepsis, left hydronephrosis, contrast). Nephrology consulted. Started CVVH on 11/5/21.     2. Gross Hematuria- CBI running. Padilla clear. Neph tube with bloody drainage but able to flush now.       3. Pyelonephritis- Perinephric stranding on CT abdomen and pelvis with a small area concerning for developing abscess in the region of the left nephrostomy tube. Will repeat CT abdomen and pelvis for further delineation. On Vancomycin.      4. Left Staghorn Calculus- Nephrostomy tube place 11/2/21. Will need treated when patient stable. Neph tube culture with MRSE--? Colonization. On Vancomycin.      5. Acute Blood Loss Anemia- Received three units PRBCs on 11/8/21. Heparin stopped.     6. Respiratory Failure secondary to Pneumonia- extubated 11/9 On Vancocin. Completed Rocephin.     7. 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        [address] [place] - [place]  11/10/21 4:30 PM  Urology

## 2021-11-10 NOTE — PROGRESS NOTES
calculi: Urology following. Percutaneous drainage tube 11/2/21 IR. Low output following tube placement. Increased flow on CBI per urology recs. Still flushing clots. Perinephric stranding noted on CT abdomen and pelvis w/ air fluid region discontiguous with region of nephrostomy tube insertion raised initial concern for abscess (<3 cm) vs emphysematous pyelonephritis when discussed with radiologist. Received 5 days empiric therapy w/ cefepime (started 11/5/21). On Vancomycin per above. Culture of nephrostomy tube aspirate from 11/5/21 shows staph epidermidis (vancomycin sensitive). Suspect nephrostomy tube colonization. D/c cefepime  Acute blood loss anemia: Suspect consumptive process. Leading differential , smear, reticulocytes, LDH, Haptoglobin, MERCEDES, B12/folate  pendingsuspect 2/2 hemolysis on CRRT. PRBC x1 transfused 10/31/21, 11/4/21, 11/5/21, x2 11/6/21, x3 11/8/21. Heparin stopped (dialysis dose still running). Holding ASA  Thrombocytopenia: suspect consumptive process. Workup per above. Not heparin per above. Platelets 84 25/9/78. Hematuria: s/p perc tube placement. CBI per above. Stopped heparin, holding ASA. Suspected Dysphagia: 2/2 prolonged intubation. SLP eval pending  Bilateral Pleural Effusion:  2/2 PNA per above with associated volume overload in context of renal failure. Interval enlargement noted on CT 11/1/21. Improvemed in concomitant pericardial effusion. CRRT started for UF removal  Suspected COPD: current every day smoker. Obstructive process noted on flow volume loop on ventilator. Started empiric therapy with Stiolto. Recommend formal PFT once improved  PAH/chronic RV failure NYHA II: EF 55 to 60% G2 DD TTE 5/4/2021. RHC showed [place] 30 with elevated PCWP. Treated w/ Riociguat. Normocytic Anemia: 2/2 hematuria s/p perc tube placement w/ Iron studies consistent w/  MERCEDES vs Anemia chronic disease. B12 WNL, Folate low, Absreticulocyte index 1.9% Soluable transferrin pending.  Calculated Iron deficit 827mg. Will start Venofer replace folate once ABx stopped  Cholelithiasis: w/ dilated CBD. Noted on 10/30/2021 US liver/GB. GI following. Does not suspect choledocolithiasis at this time. May consider HIDA scan once stable   Leukocytosis: 2/2 to pulse dose steroids per above  Stage III decubitus ulcer with inferior tunneling: S/p excision VAC placement 8/21. Low suspicion for active infection  Moderate malnutrition: Pre-albumin 14.4 w/ notable cachexia  Chronic tobacco use:   cessation  Mild AS: Noted on previous TTE not appreciated on repeat imaging  Gout:  W/o exacerbation. Continue allopurinol  BARBER: non compliant w/ CPAP  New onset atrial fibrillation with rapid ventricular response (resolved): now in NSR s/p DCCV 11/3/21 Stopped Heparin per hematuria w/ anemia requiring transfusion & and now in NSR. Stopped Amiodarone 11/8/21. Holding metoprolol 2/2 hypotension  Hyperkalemia (resolved): 2/2 ALVIN  Hyponatremia (resolved): Suspect 2/2 renal failure. Unable to obtain urine Na 2/2 bladder irrigation. Bumex resolved w/ CRRT  Nutrition: on TF @30 ml       INITIAL H AND P AND ICU COURSE:  71F admitted to [facility] 10/27/21 w/ SOB. Current smoker w/ PMH PAH grp 3, HFpEF, stage III sacral ulcer s/p wound ostomy 9/21. Patient sister reports SpO2 51% at home and was brought to ED where ABG showed pH 7.19, PCO2 80, PO2 134. She required emergent intubation and was transferred to ICU. Workup revealed left sided pleural effusion and underwent US guided thoracentesis w/ 1100 cc removed consistent w/ MRSA/adenovirus. Patient was noted to be in afib/RVR and was placed on amio, heparin drip. Patient was also noted to have normocytic anemia and received 1 unit PRBC 10/31/21. CT abdomen revealed CBD dilation w/ cholelithiasis. 11/1/2021: Plan for nephrostomy tubes today. Hemoglobin 7.7 s/p 1 unit PRBC yesterday. Weaned to 4 mics Levophed. 11/2/2021: Patient resumed back on prior dose of vancomycin.  Low urine output w/ increasing pressor requirements today    11/3/2021: Will attempt SBT if able to wean today. Increased Amio drip 2/2 worsening tachycardia. Continued hematuria w/ low urine output. Cr stable. Will evaluate UTI following perc tube placement. Rise in WBC noted. Culture pending    11/4/2021: Cardioversion on 11/3/2021. Now and NSR. CVP 29 this a.m. Suspect drop in CI 2/2 to stopping levophed. Diffuse edema following transfusion overnight. Given one-time dose of 2 mg Bumex and albumin. SBT this AM    11/5/2021: Failed SBT yesterday. Continued low urine output. Trial of Bumex today. Will proceed with dialysis if fails to improve. 11/6/2021: Started on CRRT, continues to have bloody drainage of nephrostomy tube given 1 unit PRBC, cefipime emperically pending cultures of nephrostomy tube fluid and repeat resp cultures. 11/7/21: Patient remains clinically well on exam.  We'll continue medical of UF with CRRT. On empiric cefepime for coverage of perinephric abscess noted on CT abdomen and pelvis November 4, 2021. White count trending down. Will attempt spontaneous breathing trial in a.m.    11/8/21: Transfused 1x PRBC this AM. Anemia workup pending. WBC trending down. Patient ventilator settings this AM preclude SBT. Volume overloaded on exam. UF increased to 125 cc/hr this AM. Will attempt to wean vent settings further further. Coag neg staph growing on nephrostomy tube aspirate. Suspect contamination. Continue existing ABx    11/9/21: Pulse dose steroids initiated overnight. Started on SSI. Urine output now improving 75cc/hr. Transfused 3 units PRBCs yesterday for Improve DO2. MERCEDES noted w/ 875 mg deficit. Will replace once steroids/ABx completed. 11/10/21 Extubated overnight. SLP eval today. Continue BiPAP while asleep w/ transition to NC while awake as tolerated. No tachypnea. Day 3 pulse dose steroids. Continued bloody drainage from nephrostomy tube.  Holding [facility]     Past Medical History:  Per HPI.  Family History:  DM in father and sister. Social History: chronic smoker. ROS   Unable to obtain a comprehensive ROS as patient sedated and intubated     Scheduled Meds:   insulin lispro  0-6 Units SubCUTAneous Q4H    phosphorus replacement protocol   Other RX Placeholder    methylPREDNISolone  900 mg IntraVENous Daily    potassium bicarb-citric acid  40 mEq Oral Once    sodium chloride (Inhalant)  4 mL Nebulization BID    vancomycin  1,500 mg IntraVENous Q24H    cefepime  2,000 mg IntraVENous Q12H    tiotropium-olodaterol  2 puff Inhalation Daily    [Held by provider] aspirin  81 mg Oral Daily    vancomycin (VANCOCIN) intermittent dosing (placeholder)   Other RX Placeholder    pantoprazole  40 mg IntraVENous QAM    sodium hypochlorite   Irrigation Daily    Riociguat  2.5 mg Oral Q8H    sodium chloride flush  5-40 mL IntraVENous 2 times per day    allopurinol  300 mg Oral Daily    [Held by provider] FLUoxetine  40 mg Oral Daily    [Held by provider] metoprolol tartrate  12.5 mg Oral BID    albuterol  2.5 mg Nebulization Q6H     Continuous Infusions:   phenylephrine (KRISTAL-SYNEPHRINE) 50mg/250mL infusion 20 mcg/min (11/09/21 2000)    sodium chloride      sodium chloride      dextrose      dexmedetomidine 1.4 mcg/kg/hr (11/10/21 0604)    heparin 5000 units CRRT syringe 1 mL/hr at 11/09/21 0915    bumetanide 0.1 mg/mL infusion 0.5 mg/hr (11/05/21 0904)    prismaSATE BGK 4/2.5 250 mL/hr at 11/09/21 1932    prismaSol BGK 4/2.5 500 mL/hr at 11/10/21 0325    prismaSol BGK 4/2.5 500 mL/hr at 11/09/21 2207    sodium chloride Stopped (11/08/21 2129)    [Held by provider] sodium chloride 50 mL/hr at 10/28/21 2314       PHYSICAL EXAMINATION:  T:  98.6. P: 68 RR:  13. B/P:  115/50  FiO2:  55. O2 Sat:  92%. I/O: 5957/1267 (irrigation)  Body mass index is 42.13 kg/m². GCS:   8  PC: 16 PEEP: 8: TV: 400: RRTotal: 11:     General: Acute on chronically ill-appearing elderly white female  HEENT:  normocephalic and atraumatic. No scleral icterus. PERR  Neck: supple. No Thyromegaly. Left subclavian dialysis catheter   Lungs: Wheezes heard right apex. Diffuse rhonchi appreciated. No retractions  Cardiac: RRR. No JVD. Abdomen: soft. Nontender. ,npehrostomy tube with bloody drainage  Extremities:  +2 pitting edema x4. noted No clubbing, cyanosis   Vasculature: capillary refill < 3 seconds. Palpable dorsalis pedis pulses. Skin:  warm and dry. Psych: Unable to assess as patient is currently sedated and on mechanical ventilation  Lymph:  No supraclavicular adenopathy. Neurologic:  No focal deficit. No seizures. Data: (All radiographs, tracings, PFTs, and imaging are personally viewed and interpreted unless otherwise noted). CMP : Sodium 134 potassium 4.5 chloride 103 bicarb 23 BUN/creatinine 22/0.7 anion gap 8.0 ionized calcium 1.26 magnesium 2.3 glucose 138 phosphorus 2.6 albumin 2.3 alk phos 106 ALT/AST 12/12 total bilirubin 0.7   CBC WBC 17.4 hemoglobin 9.4/30.3 platelets 81  SARS-CoV-2 NAAT negative on 10/27/2021  Telemetry shows NSR  Chest x-ray 10/31/2021 demonstrated diffuse bilateral patchy opacities with left-sided pleural effusion and cardiomegaly  Respiratory culture  11/5/21 pending  Pneumonia panel collected 10/27/2021 demonstrates staph aureus. MEC-A gene and adenovirus  CT chest on 11/1/2021 demonstrates bilateral pleural effusions with interval enlargement  CXR 11/3/2021 demonstrates worsening pulmonary congestion with bilateral effusions  UA shows bacteria w/ moderate white cells  CT Abdomen and pelvis 11/5/21 showed perinephric stranding with left-sided hypodense nodule with air-fluid level concerning for abscess. Diffuse ascites and anasarca noted.   CXR November 7, 2021 shows bilateral pulmonary edema with worsening right-sided infiltrate  CXR November 9, 2021 shows improvement in bilateral pulmonary edema with worsening left sided infiltrate   Repeat CXR 11/10/21 pending at time of evaluation. Seen with multidisciplinary ICU team.  Meets Continued ICU Level Care Criteria:    [x] Yes   [] No - Transfer Planned to listed location:  [] HOSPITALIST CONTACTED- DR       Electronically signed by Tylor Byrnes DO on 11/10/2021 at 7:19 AM  Patient seen by me. Case discussed with resident physician. Patient still requiring CRRT and pressors. Off mechanical ventilator but undergoing BiPAP salvage. Need to transition away from BiPAP salvage to high flow oxygen if unable to make this transition, then will require reintubation. .  Italicized font represents changes to the note made by me. CC time 35 minutes. Time was discontiguous. Time does not include procedures. Time does include my direct assessment of the patient and coordination of care.   Electronically signed by Alexandrea Gloria MD on 11/10/2021 at 11:36 AM

## 2021-11-10 NOTE — PROGRESS NOTES
1000- Took patient off BiPAP and placed on 6L nasal cannula. Patient alert and able to answer questions and follow commands. Worked on cough and deep breathing. 1022-Place back on BiPAP d/t increased WOB and low sats of 82-87% RR=35 and using accessory muscles. Patient states \"I can't breath. \"     1025-RR now 21. SpO2 is 96%. Patient looks more comfortable.

## 2021-11-11 ENCOUNTER — APPOINTMENT (OUTPATIENT)
Dept: GENERAL RADIOLOGY | Age: 71
DRG: 004 | End: 2021-11-11
Payer: MEDICARE

## 2021-11-11 LAB
ACINETOBACTER CALCOACETICUS-BAUMANNII BY PCR: NOT DETECTED
ADENOVIRUS BY PCR: NOT DETECTED
ALBUMIN SERPL-MCNC: 3 G/DL (ref 3.5–5.1)
ALBUMIN SERPL-MCNC: 3.3 G/DL (ref 3.5–5.1)
ALBUMIN SERPL-MCNC: 3.3 G/DL (ref 3.5–5.1)
ALBUMIN SERPL-MCNC: 3.4 G/DL (ref 3.5–5.1)
ALP BLD-CCNC: 77 U/L (ref 38–126)
ALP BLD-CCNC: 81 U/L (ref 38–126)
ALP BLD-CCNC: 88 U/L (ref 38–126)
ALP BLD-CCNC: 95 U/L (ref 38–126)
ALT SERPL-CCNC: 10 U/L (ref 11–66)
ALT SERPL-CCNC: 10 U/L (ref 11–66)
ALT SERPL-CCNC: 12 U/L (ref 11–66)
ALT SERPL-CCNC: 9 U/L (ref 11–66)
ANION GAP SERPL CALCULATED.3IONS-SCNC: 7 MEQ/L (ref 8–16)
ANION GAP SERPL CALCULATED.3IONS-SCNC: 8 MEQ/L (ref 8–16)
ANION GAP SERPL CALCULATED.3IONS-SCNC: 8 MEQ/L (ref 8–16)
ANION GAP SERPL CALCULATED.3IONS-SCNC: 9 MEQ/L (ref 8–16)
AST SERPL-CCNC: 10 U/L (ref 5–40)
AST SERPL-CCNC: 11 U/L (ref 5–40)
AST SERPL-CCNC: 11 U/L (ref 5–40)
AST SERPL-CCNC: 9 U/L (ref 5–40)
BILIRUB SERPL-MCNC: 1.1 MG/DL (ref 0.3–1.2)
BILIRUB SERPL-MCNC: 1.1 MG/DL (ref 0.3–1.2)
BILIRUB SERPL-MCNC: 1.2 MG/DL (ref 0.3–1.2)
BILIRUB SERPL-MCNC: 1.3 MG/DL (ref 0.3–1.2)
BUN BLDV-MCNC: 25 MG/DL (ref 7–22)
BUN BLDV-MCNC: 26 MG/DL (ref 7–22)
CALCIUM IONIZED: 1.3 MMOL/L (ref 1.12–1.32)
CALCIUM IONIZED: 1.3 MMOL/L (ref 1.12–1.32)
CALCIUM IONIZED: 1.31 MMOL/L (ref 1.12–1.32)
CALCIUM IONIZED: 1.32 MMOL/L (ref 1.12–1.32)
CALCIUM SERPL-MCNC: 8.5 MG/DL (ref 8.5–10.5)
CALCIUM SERPL-MCNC: 8.6 MG/DL (ref 8.5–10.5)
CALCIUM SERPL-MCNC: 8.6 MG/DL (ref 8.5–10.5)
CALCIUM SERPL-MCNC: 8.9 MG/DL (ref 8.5–10.5)
CHLAMYDIA PNEUMONIAE BY PCR: NOT DETECTED
CHLORIDE BLD-SCNC: 101 MEQ/L (ref 98–111)
CHLORIDE BLD-SCNC: 102 MEQ/L (ref 98–111)
CHLORIDE BLD-SCNC: 102 MEQ/L (ref 98–111)
CHLORIDE BLD-SCNC: 103 MEQ/L (ref 98–111)
CO2: 24 MEQ/L (ref 23–33)
CO2: 24 MEQ/L (ref 23–33)
CO2: 25 MEQ/L (ref 23–33)
CO2: 26 MEQ/L (ref 23–33)
CREAT SERPL-MCNC: 0.7 MG/DL (ref 0.4–1.2)
CREAT SERPL-MCNC: 0.8 MG/DL (ref 0.4–1.2)
CREAT SERPL-MCNC: 0.8 MG/DL (ref 0.4–1.2)
CREAT SERPL-MCNC: 0.9 MG/DL (ref 0.4–1.2)
ENTEROBACTER CLOACAE COMPLEX BY PCR: NOT DETECTED
ERYTHROCYTE [DISTWIDTH] IN BLOOD BY AUTOMATED COUNT: 18.9 % (ref 11.5–14.5)
ERYTHROCYTE [DISTWIDTH] IN BLOOD BY AUTOMATED COUNT: 19.1 % (ref 11.5–14.5)
ERYTHROCYTE [DISTWIDTH] IN BLOOD BY AUTOMATED COUNT: 19.2 % (ref 11.5–14.5)
ERYTHROCYTE [DISTWIDTH] IN BLOOD BY AUTOMATED COUNT: 19.4 % (ref 11.5–14.5)
ERYTHROCYTE [DISTWIDTH] IN BLOOD BY AUTOMATED COUNT: 61.8 FL (ref 35–45)
ERYTHROCYTE [DISTWIDTH] IN BLOOD BY AUTOMATED COUNT: 61.9 FL (ref 35–45)
ERYTHROCYTE [DISTWIDTH] IN BLOOD BY AUTOMATED COUNT: 62.7 FL (ref 35–45)
ERYTHROCYTE [DISTWIDTH] IN BLOOD BY AUTOMATED COUNT: 63.7 FL (ref 35–45)
ESCHERICHIA COLI BY PCR: NOT DETECTED
GFR SERPL CREATININE-BSD FRML MDRD: 62 ML/MIN/1.73M2
GFR SERPL CREATININE-BSD FRML MDRD: 71 ML/MIN/1.73M2
GFR SERPL CREATININE-BSD FRML MDRD: 71 ML/MIN/1.73M2
GFR SERPL CREATININE-BSD FRML MDRD: 83 ML/MIN/1.73M2
GLUCOSE BLD-MCNC: 106 MG/DL (ref 70–108)
GLUCOSE BLD-MCNC: 107 MG/DL (ref 70–108)
GLUCOSE BLD-MCNC: 109 MG/DL (ref 70–108)
GLUCOSE BLD-MCNC: 114 MG/DL (ref 70–108)
GLUCOSE BLD-MCNC: 117 MG/DL (ref 70–108)
GLUCOSE BLD-MCNC: 126 MG/DL (ref 70–108)
GLUCOSE BLD-MCNC: 132 MG/DL (ref 70–108)
GLUCOSE BLD-MCNC: 135 MG/DL (ref 70–108)
HAEMOPHILUS INFLUENZAE BY PCR: NOT DETECTED
HCT VFR BLD CALC: 23.1 % (ref 37–47)
HCT VFR BLD CALC: 24.3 % (ref 37–47)
HCT VFR BLD CALC: 25.8 % (ref 37–47)
HCT VFR BLD CALC: 26.7 % (ref 37–47)
HEMOGLOBIN: 7.1 GM/DL (ref 12–16)
HEMOGLOBIN: 7.4 GM/DL (ref 12–16)
HEMOGLOBIN: 7.9 GM/DL (ref 12–16)
HEMOGLOBIN: 8.4 GM/DL (ref 12–16)
INFLUENZA A BY PCR: NOT DETECTED
INFLUENZA B BY PCR: NOT DETECTED
KLEBSIELLA AEROGENES BY PCR: NOT DETECTED
KLEBSIELLA OXYTOCA BY PCR: NOT DETECTED
KLEBSIELLA PNEUMONIAE GROUP BY PCR: NOT DETECTED
LEGIONELLA PNEUMOPHILIA BY PCR: NOT DETECTED
MAGNESIUM: 2.2 MG/DL (ref 1.6–2.4)
MAGNESIUM: 2.2 MG/DL (ref 1.6–2.4)
MAGNESIUM: 2.3 MG/DL (ref 1.6–2.4)
MAGNESIUM: 2.3 MG/DL (ref 1.6–2.4)
MCH RBC QN AUTO: 27.6 PG (ref 26–33)
MCH RBC QN AUTO: 28.2 PG (ref 26–33)
MCH RBC QN AUTO: 28.2 PG (ref 26–33)
MCH RBC QN AUTO: 28.4 PG (ref 26–33)
MCHC RBC AUTO-ENTMCNC: 30.5 GM/DL (ref 32.2–35.5)
MCHC RBC AUTO-ENTMCNC: 30.6 GM/DL (ref 32.2–35.5)
MCHC RBC AUTO-ENTMCNC: 30.7 GM/DL (ref 32.2–35.5)
MCHC RBC AUTO-ENTMCNC: 31.5 GM/DL (ref 32.2–35.5)
MCV RBC AUTO: 90.2 FL (ref 81–99)
MCV RBC AUTO: 90.2 FL (ref 81–99)
MCV RBC AUTO: 91.7 FL (ref 81–99)
MCV RBC AUTO: 92.7 FL (ref 81–99)
METAPNEUMOVIRUS BY PCR: NOT DETECTED
MORAXELLA CATARRHALIS BY PCR: NOT DETECTED
MYCOPLASMA PNEUMONIAE BY PCR: NOT DETECTED
NON-SARS CORONAVIRUS: NOT DETECTED
PARAINFLUENZA VIRUS BY PCR: NOT DETECTED
PHOSPHORUS: 2.4 MG/DL (ref 2.4–4.7)
PHOSPHORUS: 2.6 MG/DL (ref 2.4–4.7)
PHOSPHORUS: 2.8 MG/DL (ref 2.4–4.7)
PHOSPHORUS: 2.8 MG/DL (ref 2.4–4.7)
PLATELET # BLD: 67 THOU/MM3 (ref 130–400)
PLATELET # BLD: 70 THOU/MM3 (ref 130–400)
PLATELET # BLD: 76 THOU/MM3 (ref 130–400)
PLATELET # BLD: 87 THOU/MM3 (ref 130–400)
PMV BLD AUTO: 10.1 FL (ref 9.4–12.4)
PMV BLD AUTO: 10.2 FL (ref 9.4–12.4)
PMV BLD AUTO: 9.3 FL (ref 9.4–12.4)
PMV BLD AUTO: 9.7 FL (ref 9.4–12.4)
POTASSIUM REFLEX MAGNESIUM: 4.1 MEQ/L (ref 3.5–5.2)
POTASSIUM REFLEX MAGNESIUM: 4.2 MEQ/L (ref 3.5–5.2)
POTASSIUM REFLEX MAGNESIUM: 4.3 MEQ/L (ref 3.5–5.2)
POTASSIUM REFLEX MAGNESIUM: 4.3 MEQ/L (ref 3.5–5.2)
PROTEUS SPECIES BY PCR: NOT DETECTED
PSEUDOMONAS AERUGINOSA BY PCR: NOT DETECTED
RBC # BLD: 2.52 MILL/MM3 (ref 4.2–5.4)
RBC # BLD: 2.62 MILL/MM3 (ref 4.2–5.4)
RBC # BLD: 2.86 MILL/MM3 (ref 4.2–5.4)
RBC # BLD: 2.96 MILL/MM3 (ref 4.2–5.4)
RESISTANT GENE CTX-M BY PCR: ABNORMAL
RESISTANT GENE IMP BY PCR: ABNORMAL
RESISTANT GENE KPC BY PCR: ABNORMAL
RESISTANT GENE MECA/C & MREJ BY PCR: DETECTED
RESISTANT GENE NDM BY PCR: ABNORMAL
RESISTANT GENE OXA-48-LIKE BY PCR: ABNORMAL
RESISTANT GENE VIM BY PCR: ABNORMAL
RESPIRATORY SYNCYTIAL VIRUS BY PCR: NOT DETECTED
RHINOVIRUS ENTEROVIRUS PCR: NOT DETECTED
SERRATIA MARCESCENS BY PCR: NOT DETECTED
SODIUM BLD-SCNC: 134 MEQ/L (ref 135–145)
SODIUM BLD-SCNC: 134 MEQ/L (ref 135–145)
SODIUM BLD-SCNC: 135 MEQ/L (ref 135–145)
SODIUM BLD-SCNC: 136 MEQ/L (ref 135–145)
SOLUBLE TRANSFERRIN RECEPT: 3 MG/L (ref 1.9–4.4)
SOURCE: ABNORMAL
SPECIMEN ACCEPTABILITY: ABNORMAL
STAPH AUREUS BY PCR: DETECTED
STREP AGALACTIAE BY PCR: NOT DETECTED
STREP PNEUMONIAE BY PCR: NOT DETECTED
STREP PYOGENES BY PCR: NOT DETECTED
TOTAL PROTEIN: 4.8 G/DL (ref 6.1–8)
TOTAL PROTEIN: 5 G/DL (ref 6.1–8)
TOTAL PROTEIN: 5.4 G/DL (ref 6.1–8)
TOTAL PROTEIN: 5.4 G/DL (ref 6.1–8)
WBC # BLD: 13.1 THOU/MM3 (ref 4.8–10.8)
WBC # BLD: 14 THOU/MM3 (ref 4.8–10.8)
WBC # BLD: 17.3 THOU/MM3 (ref 4.8–10.8)
WBC # BLD: 19.1 THOU/MM3 (ref 4.8–10.8)

## 2021-11-11 PROCEDURE — 87581 M.PNEUMON DNA AMP PROBE: CPT

## 2021-11-11 PROCEDURE — 83735 ASSAY OF MAGNESIUM: CPT

## 2021-11-11 PROCEDURE — 36592 COLLECT BLOOD FROM PICC: CPT

## 2021-11-11 PROCEDURE — 99233 SBSQ HOSP IP/OBS HIGH 50: CPT | Performed by: INTERNAL MEDICINE

## 2021-11-11 PROCEDURE — 71045 X-RAY EXAM CHEST 1 VIEW: CPT

## 2021-11-11 PROCEDURE — 99291 CRITICAL CARE FIRST HOUR: CPT | Performed by: INTERNAL MEDICINE

## 2021-11-11 PROCEDURE — 6360000002 HC RX W HCPCS: Performed by: STUDENT IN AN ORGANIZED HEALTH CARE EDUCATION/TRAINING PROGRAM

## 2021-11-11 PROCEDURE — 87150 DNA/RNA AMPLIFIED PROBE: CPT

## 2021-11-11 PROCEDURE — 85027 COMPLETE CBC AUTOMATED: CPT

## 2021-11-11 PROCEDURE — 6360000002 HC RX W HCPCS: Performed by: NURSE PRACTITIONER

## 2021-11-11 PROCEDURE — 87798 DETECT AGENT NOS DNA AMP: CPT

## 2021-11-11 PROCEDURE — 2700000000 HC OXYGEN THERAPY PER DAY

## 2021-11-11 PROCEDURE — 2580000003 HC RX 258: Performed by: INTERNAL MEDICINE

## 2021-11-11 PROCEDURE — 94640 AIRWAY INHALATION TREATMENT: CPT

## 2021-11-11 PROCEDURE — 36415 COLL VENOUS BLD VENIPUNCTURE: CPT

## 2021-11-11 PROCEDURE — 87070 CULTURE OTHR SPECIMN AEROBIC: CPT

## 2021-11-11 PROCEDURE — 94660 CPAP INITIATION&MGMT: CPT

## 2021-11-11 PROCEDURE — 87541 LEGION PNEUMO DNA AMP PROB: CPT

## 2021-11-11 PROCEDURE — 2580000003 HC RX 258: Performed by: STUDENT IN AN ORGANIZED HEALTH CARE EDUCATION/TRAINING PROGRAM

## 2021-11-11 PROCEDURE — 2500000003 HC RX 250 WO HCPCS

## 2021-11-11 PROCEDURE — 87486 CHLMYD PNEUM DNA AMP PROBE: CPT

## 2021-11-11 PROCEDURE — 82948 REAGENT STRIP/BLOOD GLUCOSE: CPT

## 2021-11-11 PROCEDURE — 99232 SBSQ HOSP IP/OBS MODERATE 35: CPT | Performed by: NURSE PRACTITIONER

## 2021-11-11 PROCEDURE — 31645 BRNCHSC W/THER ASPIR 1ST: CPT | Performed by: INTERNAL MEDICINE

## 2021-11-11 PROCEDURE — 94761 N-INVAS EAR/PLS OXIMETRY MLT: CPT

## 2021-11-11 PROCEDURE — 6360000002 HC RX W HCPCS

## 2021-11-11 PROCEDURE — 80053 COMPREHEN METABOLIC PANEL: CPT

## 2021-11-11 PROCEDURE — 2500000003 HC RX 250 WO HCPCS: Performed by: STUDENT IN AN ORGANIZED HEALTH CARE EDUCATION/TRAINING PROGRAM

## 2021-11-11 PROCEDURE — P9047 ALBUMIN (HUMAN), 25%, 50ML: HCPCS | Performed by: STUDENT IN AN ORGANIZED HEALTH CARE EDUCATION/TRAINING PROGRAM

## 2021-11-11 PROCEDURE — 87631 RESP VIRUS 3-5 TARGETS: CPT

## 2021-11-11 PROCEDURE — 82330 ASSAY OF CALCIUM: CPT

## 2021-11-11 PROCEDURE — 87205 SMEAR GRAM STAIN: CPT

## 2021-11-11 PROCEDURE — 2000000000 HC ICU R&B

## 2021-11-11 PROCEDURE — 6360000002 HC RX W HCPCS: Performed by: FAMILY MEDICINE

## 2021-11-11 PROCEDURE — 2580000003 HC RX 258: Performed by: EMERGENCY MEDICINE

## 2021-11-11 PROCEDURE — 0BJ08ZZ INSPECTION OF TRACHEOBRONCHIAL TREE, VIA NATURAL OR ARTIFICIAL OPENING ENDOSCOPIC: ICD-10-PCS | Performed by: INTERNAL MEDICINE

## 2021-11-11 PROCEDURE — C9113 INJ PANTOPRAZOLE SODIUM, VIA: HCPCS | Performed by: NURSE PRACTITIONER

## 2021-11-11 PROCEDURE — 84100 ASSAY OF PHOSPHORUS: CPT

## 2021-11-11 PROCEDURE — 6360000002 HC RX W HCPCS: Performed by: INTERNAL MEDICINE

## 2021-11-11 RX ORDER — CLINDAMYCIN PHOSPHATE 600 MG/50ML
600 INJECTION INTRAVENOUS EVERY 8 HOURS
Status: DISCONTINUED | OUTPATIENT
Start: 2021-11-11 | End: 2021-11-18

## 2021-11-11 RX ORDER — MIDAZOLAM HYDROCHLORIDE 1 MG/ML
2 INJECTION INTRAMUSCULAR; INTRAVENOUS ONCE
Status: DISCONTINUED | OUTPATIENT
Start: 2021-11-11 | End: 2021-11-15

## 2021-11-11 RX ORDER — ALBUTEROL SULFATE 2.5 MG/3ML
2.5 SOLUTION RESPIRATORY (INHALATION) EVERY 6 HOURS SCHEDULED
Status: DISCONTINUED | OUTPATIENT
Start: 2021-11-11 | End: 2021-11-19

## 2021-11-11 RX ORDER — KETAMINE HCL IN NACL, ISO-OSM 100MG/10ML
SYRINGE (ML) INJECTION
Status: COMPLETED
Start: 2021-11-11 | End: 2021-11-11

## 2021-11-11 RX ORDER — KETAMINE HCL IN NACL, ISO-OSM 100MG/10ML
50 SYRINGE (ML) INJECTION ONCE
Status: DISCONTINUED | OUTPATIENT
Start: 2021-11-11 | End: 2021-11-15

## 2021-11-11 RX ORDER — SODIUM CHLORIDE FOR INHALATION 3 %
4 VIAL, NEBULIZER (ML) INHALATION EVERY 6 HOURS
Status: DISCONTINUED | OUTPATIENT
Start: 2021-11-11 | End: 2021-11-11

## 2021-11-11 RX ORDER — SODIUM CHLORIDE FOR INHALATION 3 %
4 VIAL, NEBULIZER (ML) INHALATION EVERY 6 HOURS SCHEDULED
Status: DISCONTINUED | OUTPATIENT
Start: 2021-11-11 | End: 2021-11-19

## 2021-11-11 RX ORDER — MIDAZOLAM HYDROCHLORIDE 1 MG/ML
INJECTION INTRAMUSCULAR; INTRAVENOUS
Status: COMPLETED
Start: 2021-11-11 | End: 2021-11-11

## 2021-11-11 RX ADMIN — SODIUM CHLORIDE SOLN NEBU 3% 4 ML: 3 NEBU SOLN at 16:17

## 2021-11-11 RX ADMIN — Medication: at 13:52

## 2021-11-11 RX ADMIN — ALBUTEROL SULFATE 2.5 MG: 2.5 SOLUTION RESPIRATORY (INHALATION) at 05:22

## 2021-11-11 RX ADMIN — TIOTROPIUM BROMIDE AND OLODATEROL 2 PUFF: 3.124; 2.736 SPRAY, METERED RESPIRATORY (INHALATION) at 07:40

## 2021-11-11 RX ADMIN — Medication: at 06:11

## 2021-11-11 RX ADMIN — CLINDAMYCIN PHOSPHATE 600 MG: 600 INJECTION, SOLUTION INTRAVENOUS at 16:04

## 2021-11-11 RX ADMIN — HEPARIN SODIUM: 1000 INJECTION, SOLUTION INTRAVENOUS; SUBCUTANEOUS at 01:10

## 2021-11-11 RX ADMIN — ALBUMIN (HUMAN) 25 G: 0.25 INJECTION, SOLUTION INTRAVENOUS at 03:01

## 2021-11-11 RX ADMIN — SODIUM CHLORIDE 0.5 MCG/KG/HR: 9 INJECTION, SOLUTION INTRAVENOUS at 17:11

## 2021-11-11 RX ADMIN — SODIUM CHLORIDE 0.5 MCG/HR: 9 INJECTION, SOLUTION INTRAVENOUS at 06:47

## 2021-11-11 RX ADMIN — Medication: at 00:13

## 2021-11-11 RX ADMIN — Medication: at 16:34

## 2021-11-11 RX ADMIN — SODIUM CHLORIDE, PRESERVATIVE FREE 10 ML: 5 INJECTION INTRAVENOUS at 10:27

## 2021-11-11 RX ADMIN — ALBUTEROL SULFATE 2.5 MG: 2.5 SOLUTION RESPIRATORY (INHALATION) at 16:17

## 2021-11-11 RX ADMIN — MIDAZOLAM 2 MG: 1 INJECTION INTRAMUSCULAR; INTRAVENOUS at 16:35

## 2021-11-11 RX ADMIN — SODIUM CHLORIDE, PRESERVATIVE FREE 10 ML: 5 INJECTION INTRAVENOUS at 20:21

## 2021-11-11 RX ADMIN — SODIUM CHLORIDE 25 ML: 9 INJECTION, SOLUTION INTRAVENOUS at 23:28

## 2021-11-11 RX ADMIN — CLINDAMYCIN PHOSPHATE 600 MG: 600 INJECTION, SOLUTION INTRAVENOUS at 23:30

## 2021-11-11 RX ADMIN — HEPARIN SODIUM: 1000 INJECTION, SOLUTION INTRAVENOUS; SUBCUTANEOUS at 13:47

## 2021-11-11 RX ADMIN — PANTOPRAZOLE SODIUM 40 MG: 40 INJECTION, POWDER, FOR SOLUTION INTRAVENOUS at 10:27

## 2021-11-11 RX ADMIN — Medication: at 10:19

## 2021-11-11 RX ADMIN — SODIUM HYPOCHLORITE: 1.25 SOLUTION TOPICAL at 00:14

## 2021-11-11 RX ADMIN — Medication: at 13:53

## 2021-11-11 RX ADMIN — SODIUM CHLORIDE SOLN NEBU 3% 4 ML: 3 NEBU SOLN at 15:59

## 2021-11-11 RX ADMIN — Medication: at 10:16

## 2021-11-11 RX ADMIN — SODIUM CHLORIDE 0.4 MCG/KG/HR: 9 INJECTION, SOLUTION INTRAVENOUS at 23:05

## 2021-11-11 ASSESSMENT — PAIN SCALES - GENERAL
PAINLEVEL_OUTOF10: 0

## 2021-11-11 NOTE — PROCEDURES
BRONCHOSCOPY: Medical necessity. Indication: Left lung collapse    Sedation:   2 mg Versed and 50 mg ketamine    Findings: Clot obstructing the left main stem. Patient required therapeutic bronchoscopy to remove. This did require piecemeal extraction with 3 separate intubations to clear. Subsequent to clot clearing, patient required saline irrigation to the left lung fields to clear the remaining clot. Left lung did open after clot removal.    Patient has chronic airway inflammation. Patient had poor and ineffectual cough. Patient had membranous collapse of the airway with obstruction of the airway with tidal respirations. Samples:   Left mainstem clot. EBL:   None    Complications: None    Procedure:  After informed consent and time out completed, patient received oxygen to prep for procedure. Patient received sedation as noted above. Posterior oropharynx anesthetized with Cetacaine. Utilizing the left naris, the bronchoscope was passed to the level of the cords. Scope was taken to the trachea and subsequently the avelina. Scope was taken to the right lung fields, and then the left lung fields. Findings and sampling detailed above. Scope was removed. Electronically signed by Aiden Santoro MD.

## 2021-11-11 NOTE — PROGRESS NOTES
Urology Progress Note    Chief Complaint: SOB  Reason for consultation:  Kidney stone    Subjective:     Pt extubated . On Bipap. Follows commands. Denies pain. Anasarca. CBI with clear very light yellow urine at 150 ml/hr. Nephrostomy with bloody drainage. Nursing reports flushing easily. On CVVH. Vitals:  BP (!) 99/47   Pulse 82   Temp 98.1 °F (36.7 °C) (Oral)   Resp 24   Ht 4' 9\" (1.448 m)   Wt 190 lb 4.1 oz (86.3 kg)   SpO2 93%   BMI 41.17 kg/m²   Temp  Av.4 °F (36.9 °C)  Min: 98.1 °F (36.7 °C)  Max: 98.9 °F (37.2 °C)    Intake/Output Summary (Last 24 hours) at 2021 0805  Last data filed at 2021 0715  Gross per 24 hour   Intake 2774.98 ml   Output 4671 ml   Net -1896.02 ml       Social History     Socioeconomic History    Marital status: Single     Spouse name: Not on file    Number of children: 0    Years of education: Not on file    Highest education level: Not on file   Occupational History    Not on file   Tobacco Use    Smoking status: Never Smoker    Smokeless tobacco: Never Used   Vaping Use    Vaping Use: Never used   Substance and Sexual Activity    Alcohol use: No    Drug use: No    Sexual activity: Not Currently   Other Topics Concern    Not on file   Social History Narrative    Not on file     Social Determinants of Health     Financial Resource Strain: Low Risk     Difficulty of Paying Living Expenses: Not very hard   Food Insecurity: No Food Insecurity    Worried About Running Out of Food in the Last Year: Never true    Traci of Food in the Last Year: Never true   Transportation Needs:     Lack of Transportation (Medical): Not on file    Lack of Transportation (Non-Medical):  Not on file   Physical Activity:     Days of Exercise per Week: Not on file    Minutes of Exercise per Session: Not on file   Stress:     Feeling of Stress : Not on file   Social Connections:     Frequency of Communication with Friends and Family: Not on file    Frequency of Social Gatherings with Friends and Family: Not on file    Attends Mu-ism Services: Not on file    Active Member of Clubs or Organizations: Not on file    Attends Club or Organization Meetings: Not on file    Marital Status: Not on file   Intimate Partner Violence:     Fear of Current or Ex-Partner: Not on file    Emotionally Abused: Not on file    Physically Abused: Not on file    Sexually Abused: Not on file   Housing Stability:     Unable to Pay for Housing in the Last Year: Not on file    Number of Places Lived in the Last Year: Not on file    Unstable Housing in the Last Year: Not on file     Family History   Problem Relation Age of Onset    Diabetes Father     Arthritis Mother     COPD Mother     Diabetes Sister     Heart Disease Maternal Uncle     Breast Cancer Niece 36    Sleep Apnea Brother     Asthma Neg Hx     Birth Defects Neg Hx     Cancer Neg Hx     Depression Neg Hx     Early Death Neg Hx     Hearing Loss Neg Hx     High Blood Pressure Neg Hx     High Cholesterol Neg Hx     Kidney Disease Neg Hx     Learning Disabilities Neg Hx     Mental Illness Neg Hx     Mental Retardation Neg Hx     Miscarriages / Stillbirths Neg Hx     Stroke Neg Hx     Substance Abuse Neg Hx     Vision Loss Neg Hx     Other Neg Hx      No Known Allergies      Constitutional: Alert and follows commands, ill appearing. Anasarca  HEENT:   Head:         Normocephalic and atraumatic. Mucous membranes are normal.   Eyes:         EOM are normal. No scleral icterus. Nose:    The external appearance of the nose is normal  Ears: The ears appear normal to external inspection. Cardiovascular:       Normal rate, regular rhythm. Pulmonary/Chest:  Rhonchi, weak cough, on Bipap  Abdominal:          Soft. Large. No tenderness. Hypoactive bowel sounds. L nephrostomy tube with blood tinged drainage without clots. Genitalia:    CBI with clear yellow urine. Musculoskeletal:    Normal range of motion. Exhibits no edema or tenderness of lower extremities. Extremities:   Pitting edema  Neurological:    Alert     Labs:  WBC:    Lab Results   Component Value Date    WBC 17.3 11/11/2021     Hemoglobin/Hematocrit:    Lab Results   Component Value Date    HGB 7.9 11/11/2021    HCT 25.8 11/11/2021     BMP:    Lab Results   Component Value Date     11/11/2021    K 4.2 11/11/2021     11/11/2021    CO2 24 11/11/2021    BUN 25 11/11/2021    LABALBU 3.4 11/11/2021    CREATININE 0.7 11/11/2021    CALCIUM 8.9 11/11/2021    LABGLOM 83 11/11/2021     information found for this patient  Narrative   PROCEDURE: CT ABDOMEN PELVIS WO CONTRAST       CLINICAL INFORMATION: evaluate for left perinephric abscess .           TECHNIQUE: 2-D multiplanar noncontrast CT abdomen pelvis   All CT scans at this facility use dose modulation, iterative reconstruction, and/or weight-based dosing when appropriate to reduce radiation dose to as low as reasonably achievable.       COMPARISON: 11/4/2021           FINDINGS: Limitations:   Solid organ hollow viscera evaluation limited without contrast.   Examination is further limited by extensive motion artifact and streak artifact. Lung bases   Moderate to large pleural effusions and bilateral groundglass infiltrates. Cardiomegaly. Extensive coronary artery calcifications.       Abdomen pelvis   There is a nephrostomy tube present in the left renal pelvis. There are renal calculi. Motion artifact distorts the image. Perinephric abscess is not definitively identified. Liver is unremarkable. There are gallstones. Right kidney is unremarkable. Spleen is normal. Pancreas appears unremarkable. No evidence of bowel obstruction is liquid stool in the colon and rectum indicating presence of diarrhea. Padilla catheter is present in the bladder bladder is mildly distended.    There is marked subcutaneous edema throughout the abdomen and pelvis.     Impression   1. Very limited evaluation. Large bilateral pleural effusions and bilateral airspace infiltrates. #2 subcutaneous edema throughout the abdomen and pelvis indicating presence of third spacing. 3. Presence or absence of a perinephric abscess cannot be confirmed.               Assessment and Plan  1. ALVIN- Multifactorial (sepsis, left hydronephrosis, contrast). Nephrology consulted. Started CVVH on 11/5/21.     2. Gross Hematuria- CBI running. Padilla clear. Neph tube with bloody drainage flushing without difficulty.       3. Pyelonephritis- Perinephric stranding on CT abdomen and pelvis with a small area concerning for developing abscess in the region of the left nephrostomy tube. Will repeat CT abdomen and pelvis for further delineation. On Vancomycin.      4. Left Staghorn Calculus- Nephrostomy tube place 11/2/21. Will need treated when patient stable. Neph tube culture with MRSE--? Colonization. On Vancomycin.      5. Acute Blood Loss Anemia- Received three units PRBCs on 11/8/21. Heparin stopped.     6. Respiratory Failure secondary to Pneumonia- extubated 11/9 On Vancocin. Completed Rocephin.     7. 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        [facility] [facility]  11/11/21 8:05 AM  Urology

## 2021-11-11 NOTE — PROGRESS NOTES
O2 sats dropped to 78%-  Dr Pauline Malcolm and RT at bedside. Pt placed on BIPAP @ 70%. 1000- Resting- eyes closed. 1120- Family meeting held with Dr Pauline Malcolm re:code status  [id number]- CRRT stopped due to filter clotting,   1413- CRRT restarted as per previous orders.

## 2021-11-11 NOTE — PROGRESS NOTES
CRITICAL CARE PROGRESS NOTE      Patient:  Tamera Louise    Unit/Bed:4D-09/009-A  YOB: 1950  MRN: [id number]   PCP: Dhaval Ramos MD  Date of Admission: 10/27/2021  Chief Complaint:- Shortness of breath    Assessment and Plan:    1. Acute combined hypercapnic and hypoxic respiratory failure: Secondary to severe pneumonia with left-sided pleural effusion. Intubated 10/27/21. Extubated following successful SBT on 11/9/21. Will continue BiPAP while asleep and wean to NC while awake. Worsening respiratory status this AM w/ SpO2 < 80% on HFNC and tachypnea. Resumed BiPAP  2. Severe bilateral multiorganism pneumonia: PCR positive Staph w/ MECA gene and adenovirus. Culture growing MSSA. Repeat culture 11/5/21 shows coag positive staph on Day 14/14 Vancomycin (started 10/28/21) and completed 5 days Rocephin. 3. Septic Shock: 2/2 severe PNA. CI 6.3 NE d/c 2/2 worsening afib RVR. NE d/c on 11/3/21 2/2 worsening afib/RVR. .Currently on pressor support w/ phenylephrine. Nephrostomy and repeat cultures 11/5 pending on cefipmie emperically   4. Stage II ALVIN on CKD 3: Suspect post renal etiology 2/2 staghorn calculi. Nephrology following. CRRT initiated 11/5/21 will continue for UF removal. 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 ANCA associated Abs show elevated anti MPO elevated 416 and serine proteinase 3 IgG WNL. Will need Bx for confirmation. Mitochondrial Abs pending, Anti-DS-DNA negative, Anti histone negative, Anti Smith,and  Anti-Savana WNL,  GBM antibodies negative, C3/C4 levels normal, elevated kappa/lambda free light chains with normal ratio. No plans per Bx per nephrology. Fluid collection noted on left renal unlikely abscess as patient remains afebrile. .  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 Will need Renal Bx for confirmation once stable. Discussed case Nephrology. Completed pulse dose steroids starting on 11/8/21 w/  10mg/kg methylprednisolone for 3 days.  SSI started for coverage. Not candidate for Rituxin/TPE 2/2 existing infection. 6. Hydronephrosis 2/2 Left-sided staghorn calculi: Urology following. Percutaneous drainage tube 11/2/21 IR. Low output following tube placement. Increased flow on CBI per urology recs. Still flushing clots. Perinephric stranding noted on CT abdomen and pelvis w/ air fluid region discontiguous with region of nephrostomy tube insertion raised initial concern for abscess (<3 cm) vs emphysematous pyelonephritis when discussed with radiologist. Received 5 days empiric therapy w/ cefepime (started 11/5/21). On Vancomycin per above. Culture of nephrostomy tube aspirate from 11/5/21 shows staph epidermidis (vancomycin sensitive). Suspect nephrostomy tube colonization. D/c cefepime  7. Acute blood loss anemia: Suspect consumptive process. Leading differential , smear, reticulocytes, LDH, Haptoglobin, MERCEDES, B12/folate  pendingsuspect 2/2 hemolysis on CRRT. PRBC x1 transfused 10/31/21, 11/4/21, 11/5/21, x2 11/6/21, x3 11/8/21. Heparin stopped (dialysis dose still running). Holding ASA  8. Thrombocytopenia: suspect consumptive process. Workup per above. Not heparin per above. Platelets 84 49/5/63.   9. Hematuria: s/p perc tube placement. CBI per above. Stopped heparin, holding ASA. 10. Suspected Dysphagia: 2/2 prolonged intubation. SLP eval pending  11. Bilateral Pleural Effusion:  2/2 PNA per above with associated volume overload in context of renal failure. Interval enlargement noted on CT 11/1/21. Improvemed in concomitant pericardial effusion. CRRT started for UF removal  12. Suspected COPD: current every day smoker. Obstructive process noted on flow volume loop on ventilator. Started empiric therapy with Stiolto. Recommend formal PFT once improved  13. PAH/chronic RV failure NYHA II: EF 55 to 60% G2 DD TTE 5/4/2021. RHC showed [place] 30 with elevated PCWP. Treated w/ Riociguat.     14. Normocytic Anemia: 2/2 hematuria s/p perc tube placement w/ Iron studies consistent w/  MERCEDES vs Anemia chronic disease. B12 WNL, Folate low, Absreticulocyte index 1.9% Soluable transferrin pending. Calculated Iron deficit 827mg. Will start Venofer replace folate once ABx stopped  15. Cholelithiasis: w/ dilated CBD. Noted on 10/30/2021 US liver/GB. GI following. Does not suspect choledocolithiasis at this time. May consider HIDA scan once stable   16. Leukocytosis: 2/2 to pulse dose steroids per above  17. Stage III decubitus ulcer with inferior tunneling: S/p excision VAC placement 8/21. Low suspicion for active infection  18. Moderate malnutrition: Pre-albumin 14.4 w/ notable cachexia  19. Chronic tobacco use:   cessation  20. Mild AS: Noted on previous TTE not appreciated on repeat imaging  21. Gout:  W/o exacerbation. Continue allopurinol  22. BARBER: non compliant w/ CPAP  23. New onset atrial fibrillation with rapid ventricular response (resolved): now in NSR s/p DCCV 11/3/21 Stopped Heparin per hematuria w/ anemia requiring transfusion & and now in NSR. Stopped Amiodarone 11/8/21. Holding metoprolol 2/2 hypotension  24. Hyperkalemia (resolved): 2/2 ALVIN  25. Hyponatremia (resolved): Suspect 2/2 renal failure. Unable to obtain urine Na 2/2 bladder irrigation. Bumex resolved w/ CRRT  26. Nutrition: on TF @30 ml       INITIAL H AND P AND ICU COURSE:  71F admitted to [facility] 10/27/21 w/ SOB. Current smoker w/ PMH PAH grp 3, HFpEF, stage III sacral ulcer s/p wound ostomy 9/21. Patient sister reports SpO2 51% at home and was brought to ED where ABG showed pH 7.19, PCO2 80, PO2 134. She required emergent intubation and was transferred to ICU. Workup revealed left sided pleural effusion and underwent US guided thoracentesis w/ 1100 cc removed consistent w/ MRSA/adenovirus. Patient was noted to be in afib/RVR and was placed on amio, heparin drip. Patient was also noted to have normocytic anemia and received 1 unit PRBC 10/31/21. CT abdomen revealed CBD dilation w/ cholelithiasis. while asleep w/ transition to NC while awake as tolerated. No tachypnea. Day 3 pulse dose steroids. Continued bloody drainage from nephrostomy tube. Holding [facility] [place]     11/11/21 Worsening bilateral infiltrates with SpO2 <90% on HFNC. Continued pulmonary hygiene w/ nebulized hypertonic saline, acapella and breathing treatments in addition to deep suctioning. Continued on Vancomycin for MRSA PNA. Plan for family meeting this AM to discuss goals of care. Past Medical History:  Per HPI. Family History:  DM in father and sister. Social History: chronic smoker. ROS   Patient reports fatigue, anxiety and generalized discomfort but otherwise denies pain.  A thorough review of systems was limited by patient's respiratory status    Scheduled Meds:   insulin lispro  0-6 Units SubCUTAneous Q4H    phosphorus replacement protocol   Other RX Placeholder    potassium bicarb-citric acid  40 mEq Oral Once    sodium chloride (Inhalant)  4 mL Nebulization BID    tiotropium-olodaterol  2 puff Inhalation Daily    [Held by provider] aspirin  81 mg Oral Daily    pantoprazole  40 mg IntraVENous QAM    sodium hypochlorite   Irrigation Daily    Riociguat  2.5 mg Oral Q8H    sodium chloride flush  5-40 mL IntraVENous 2 times per day    allopurinol  300 mg Oral Daily    [Held by provider] FLUoxetine  40 mg Oral Daily    [Held by provider] metoprolol tartrate  12.5 mg Oral BID    albuterol  2.5 mg Nebulization Q6H     Continuous Infusions:   phenylephrine (KRISTAL-SYNEPHRINE) 50mg/250mL infusion 10 mcg/min (11/11/21 0715)    sodium chloride      dextrose      dexmedetomidine 0.5 mcg/kg/hr (11/11/21 0715)    heparin 5000 units CRRT syringe 1 mL/hr at 11/11/21 0110    bumetanide 0.1 mg/mL infusion 0.5 mg/hr (11/05/21 0904)    prismaSATE BGK 4/2.5 250 mL/hr at 11/10/21 1344    prismaSol BGK 4/2.5 500 mL/hr at 11/11/21 0611    prismaSol BGK 4/2.5 500 mL/hr at 11/11/21 0013    sodium chloride Stopped (11/08/21 2129)   Parrish Saliva [Held by provider] sodium chloride 50 mL/hr at 10/28/21 0574       PHYSICAL EXAMINATION:  T:  98.1. P: 82 RR:  13. B/P: 99/47  FiO2: 60. O2 Sat:  93%. I/O: 6630/9369 (irrigation)  Body mass index is 41.17 kg/m². GCS:   8  PC: 16 PEEP: 8: TV: 400: RRTotal: 11: General: Acute on chronically ill-appearing elderly white female  HEENT:  normocephalic and atraumatic. No scleral icterus. PERR  Neck: supple. No Thyromegaly. Left subclavian dialysis catheter   Lungs: Diffuse rhonchi appreciated. No retractions  Cardiac: RRR. No JVD. Abdomen: soft. Nontender. ,npehrostomy tube with bloody drainage  Extremities:  +2 pitting edema x4. (interval improvement noted) No clubbing, cyanosis   Vasculature: capillary refill < 3 seconds. Palpable dorsalis pedis pulses. Skin:  warm and dry. Psych: Alert and oriented to person place and time, affect appropriate  Lymph:  No supraclavicular adenopathy. Neurologic:  No focal deficit. No seizures. Data: (All radiographs, tracings, PFTs, and imaging are personally viewed and interpreted unless otherwise noted).  CMP: Sodium 134 potassium 4.5 chloride 101 bicarb 24 BUN/creatinine 25/0.7 anion gap 9.0 ionized calcium 1.30 magnesium 2.3 glucose 132 calcium 8.9 phosphorus 2.6 total protein 5.4 albumin 3.4 alk phos 88 ALT/AST 10/11 total bilirubin 1.2   CBC WBC 17.3 H&H 7.9/25.8 platelets 76   SARS-CoV-2 NAAT negative on 10/27/2021   Telemetry shows NSR   Chest x-ray 10/31/2021 demonstrated diffuse bilateral patchy opacities with left-sided pleural effusion and cardiomegaly   Respiratory culture  11/5/21 pending   Pneumonia panel collected 10/27/2021 demonstrates staph aureus.   MEC-A gene and adenovirus   CT chest on 11/1/2021 demonstrates bilateral pleural effusions with interval enlargement   CXR 11/3/2021 demonstrates worsening pulmonary congestion with bilateral effusions   UA shows bacteria w/ moderate white cells   CT Abdomen and pelvis 11/5/21 showed perinephric stranding with left-sided hypodense nodule with air-fluid level concerning for abscess. Diffuse ascites and anasarca noted.  CXR November 7, 2021 shows bilateral pulmonary edema with worsening right-sided infiltrate   CXR November 9, 2021 shows improvement in bilateral pulmonary edema with worsening left sided infiltrate    Repeat CXR 11/10/21 pending at time of evaluation. Seen with multidisciplinary ICU team.  Meets Continued ICU Level Care Criteria:    [x] Yes   [] No - Transfer Planned to listed location:  [] HOSPITALIST CONTACTED- DR       Electronically signed by Germain Mota DO on 11/11/2021 at 8:36 AM    Patient seen by me. Case discussed with resident physician. Patient required medically necessary bronchoscopy 11/11/2021 for white out of the left lung from airway obstruction. Bronchoscopy showed primarily clots obstructing the airway to the left mainstem. This was cleared and the left lung did start to open. Patient still requiring dialysis and pressors. I do have concerns as patient's cough is ineffectual and she has membranous airway collapse with tidal respirations. .  Italicized font represents changes to the note made by me. CC time 35 minutes. Time was discontiguous. Time does not include procedures. Time does include my direct assessment of the patient and coordination of care.   Electronically signed by Trude Bence, MD on 11/11/2021 at 4:42 PM

## 2021-11-11 NOTE — FLOWSHEET NOTE
No family is present . 11/10/21 1320   Encounter Summary   Services provided to: Patient   Referral/Consult From: [facility] Family members   [facility] of [facility] Visiting Yes  (11/10 )   Complexity of Encounter Low   Length of Encounter 15 minutes   Spiritual/Lutheran   Type Spiritual support   had prayer.

## 2021-11-11 NOTE — PROGRESS NOTES
[facility] THERAPY MISSED TREATMENT NOTE  [facility] ICU 4D      Date: 2021  Patient Name: Cozette Dance        MRN: [id number]    : 1950  (73 y.o.)    REASON FOR MISSED TREATMENT:  ST attempted to see patient this date  to complete dysphagia therapy to determine readiness for instrumental assessment, unable to see patient due to patient currently on BiPAP. RN reporting patient plans to stay on BiPAP and plans for possible reintubation. ST to re-attempt as patient is medically appropriate.     CHRISTIN Chavira., Student Intern  Troy Preciado M.S. Edwina Junior 2021

## 2021-11-11 NOTE — SIGNIFICANT EVENT
Worsening infiltrates noted on chest x-ray this morning. Patient SPO2 less than 85% with labored breathing on high flow nasal cannula during morning rounds. Switched to BiPAP. When asked if the patient wishes to be reintubated this morning patient shook her head no. This question was then repeated asking if she would not be like to be intubated even to save her life if she might die and patient nodded in agreement. This question was repeated for confirmation. Called Jeffery Arambula this morning with plans for a family meeting at 8 AM.  Will discuss further goals of care including code status at that time.

## 2021-11-11 NOTE — PROGRESS NOTES
[facility]         Renal Inpatient Follow-Up note         11/11/2021 9:49 AM    Pt Name:   Yair Maw:   1950  Attending:   Terrence Lopez MD    Chief Complaint : Alireza Cueva is a 73 y.o. female being followed by nephrology for ALVIN/CKD    Interval History :   Patient seen and examined by me. No distress  Extubated and somewhat confused cannot assess history or review of systems  Having some urine output .   CVVH running well with 100 mL of ultrafiltration per hour  Patient is at 40% FiO2  Still on Benjamin-Synephrine at a low-dose, blood pressure better     Scheduled Medications :    insulin lispro  0-6 Units SubCUTAneous Q4H    phosphorus replacement protocol   Other RX Placeholder    potassium bicarb-citric acid  40 mEq Oral Once    sodium chloride (Inhalant)  4 mL Nebulization BID    tiotropium-olodaterol  2 puff Inhalation Daily    [Held by provider] aspirin  81 mg Oral Daily    pantoprazole  40 mg IntraVENous QAM    sodium hypochlorite   Irrigation Daily    Riociguat  2.5 mg Oral Q8H    sodium chloride flush  5-40 mL IntraVENous 2 times per day    allopurinol  300 mg Oral Daily    [Held by provider] FLUoxetine  40 mg Oral Daily    [Held by provider] metoprolol tartrate  12.5 mg Oral BID    albuterol  2.5 mg Nebulization Q6H      phenylephrine (BENJAMIN-SYNEPHRINE) 50mg/250mL infusion 10 mcg/min (11/11/21 0715)    sodium chloride      dextrose      dexmedetomidine 0.5 mcg/kg/hr (11/11/21 0715)    heparin 5000 units CRRT syringe 1 mL/hr at 11/11/21 0110    bumetanide 0.1 mg/mL infusion 0.5 mg/hr (11/05/21 0904)    prismaSATE BGK 4/2.5 250 mL/hr at 11/10/21 1344    prismaSol BGK 4/2.5 500 mL/hr at 11/11/21 0611    prismaSol BGK 4/2.5 500 mL/hr at 11/11/21 0013    sodium chloride Stopped (11/08/21 2129)    [Held by provider] sodium chloride 50 mL/hr at 10/28/21 2314       Vitals :  BP (!) 99/47   Pulse 82   Temp 98.1 °F (36.7 °C) (Oral)   Resp 24 Ht 4' 9\" (1.448 m)   Wt 190 lb 4.1 oz (86.3 kg)   SpO2 93%   BMI 41.17 kg/m²     24HR INTAKE/OUTPUT:      Intake/Output Summary (Last 24 hours) at 11/11/2021 0949  Last data filed at 11/11/2021 0900  Gross per 24 hour   Intake 2254.22 ml   Output 4724 ml   Net -2469.78 ml     Last 3 weights  Wt Readings from Last 3 Encounters:   11/11/21 190 lb 4.1 oz (86.3 kg)   10/25/21 164 lb (74.4 kg)   09/29/21 160 lb (72.6 kg)           Physical Exam :  General Appearance:  Well developed. No distress  Mouth/Throat:  Oral mucosa moist.   Neck:  Supple, no JVD  Lungs:  Breath sounds: clear, diminished  Heart[de-identified]  S1,S2 heard  Abdomen:  Soft, non - tender  Musculoskeletal:  Edema - noted but appears better         Last 3 CBC   Recent Labs     11/10/21  1750 11/10/21  2340 11/11/21  0545   WBC 18.6* 19.1* 17.3*   RBC 3.08* 2.96* 2.86*   HGB 8.7* 8.4* 7.9*   HCT 27.7* 26.7* 25.8*   PLT 86* 87* 76*     Last 3 CMP  Recent Labs     11/10/21  1750 11/10/21  2340 11/11/21  0545    134* 134*   K 4.2 4.1 4.2    102 101   CO2 22* 24 24   BUN 24* 26* 25*   CREATININE 0.7 0.8 0.7   CALCIUM 8.7 8.5 8.9   LABALBU 3.1* 3.3* 3.4*   BILITOT 1.0 1.3* 1.2             ASSESSMENT / Plan   1. Renal -acute kidney injury, multifactorial etiology which includes hypotension from sepsis, IV contrast exposure on 10/29 and has some hydronephrosis as well . ? Patient's creatinine continued to get worse, but primarily fluid status worsened, did not respond to diuretics so started her on CVVH on 11/5/2021  ? So far tolerating well. . on low dose therapy volume . Continue for now. ? Monitor labs .     2. Electrolytes -replace all electrolytes per protocol as needed, mild hyponatremia. 3. Mild acidosis stable on CVVH  4. Acute hypercapnic respiratory failure currently on a BIPAP  5. Pneumonia with pleural effusion and septic shock  6. Hypotension on pressors wean to MAP greater than 65  7.  Hydronephrosis status post nephrostomy tube placement  8. + ANCA, patient has been having some proteinuria as an outpatient and during my last visit have ordered all the serologic work-up , which the patient has done prior to getting admitted to the hospital however the ANCA was not done at that time. She may be having some renal pathology going on , but I doubt it has anything to do with acute renal dysfunction during this admission. However getting steroids. Once clinically stable might consider a renal biopsy. 9. Meds reviewed and discussed with RN    Dr. Yamila Jimenez MD, M,D.  [facility].

## 2021-11-11 NOTE — FLOWSHEET NOTE
11/11/21 1342   Encounter Summary   Services provided to: Patient   Referral/Consult From: Rounding   Continue Visiting Yes  (11/11 NR)   Complexity of Encounter Low   Length of Encounter 15 minutes   Routine   Type Follow up   Assessment Approachable   Intervention Prayer   In my encounter with the 73  yr old patient, I attempted to see the patient but they were unresponsive and on the ventilator at this time. No family was present in the room. I offered prayer at the pts side. A  will attempt to see the patient at a later time as a follow up. The pt was admitted due to acute respiratory failure.

## 2021-11-12 ENCOUNTER — APPOINTMENT (OUTPATIENT)
Dept: GENERAL RADIOLOGY | Age: 71
DRG: 004 | End: 2021-11-12
Payer: MEDICARE

## 2021-11-12 LAB
ABO: NORMAL
ALBUMIN SERPL-MCNC: 3 G/DL (ref 3.5–5.1)
ALBUMIN SERPL-MCNC: 3.1 G/DL (ref 3.5–5.1)
ALBUMIN SERPL-MCNC: 3.1 G/DL (ref 3.5–5.1)
ALP BLD-CCNC: 79 U/L (ref 38–126)
ALP BLD-CCNC: 82 U/L (ref 38–126)
ALP BLD-CCNC: 86 U/L (ref 38–126)
ALT SERPL-CCNC: 10 U/L (ref 11–66)
ALT SERPL-CCNC: 11 U/L (ref 11–66)
ALT SERPL-CCNC: 12 U/L (ref 11–66)
ANION GAP SERPL CALCULATED.3IONS-SCNC: 5 MEQ/L (ref 8–16)
ANION GAP SERPL CALCULATED.3IONS-SCNC: 9 MEQ/L (ref 8–16)
ANION GAP SERPL CALCULATED.3IONS-SCNC: 9 MEQ/L (ref 8–16)
ANTIBODY SCREEN: NORMAL
AST SERPL-CCNC: 11 U/L (ref 5–40)
AST SERPL-CCNC: 11 U/L (ref 5–40)
AST SERPL-CCNC: 9 U/L (ref 5–40)
BILIRUB SERPL-MCNC: 0.9 MG/DL (ref 0.3–1.2)
BILIRUB SERPL-MCNC: 1 MG/DL (ref 0.3–1.2)
BILIRUB SERPL-MCNC: 1.1 MG/DL (ref 0.3–1.2)
BUN BLDV-MCNC: 26 MG/DL (ref 7–22)
BUN BLDV-MCNC: 27 MG/DL (ref 7–22)
BUN BLDV-MCNC: 35 MG/DL (ref 7–22)
CALCIUM IONIZED: 1.3 MMOL/L (ref 1.12–1.32)
CALCIUM IONIZED: 1.3 MMOL/L (ref 1.12–1.32)
CALCIUM IONIZED: 1.32 MMOL/L (ref 1.12–1.32)
CALCIUM SERPL-MCNC: 8.4 MG/DL (ref 8.5–10.5)
CALCIUM SERPL-MCNC: 8.6 MG/DL (ref 8.5–10.5)
CALCIUM SERPL-MCNC: 8.6 MG/DL (ref 8.5–10.5)
CHLORIDE BLD-SCNC: 104 MEQ/L (ref 98–111)
CHLORIDE BLD-SCNC: 104 MEQ/L (ref 98–111)
CHLORIDE BLD-SCNC: 105 MEQ/L (ref 98–111)
CO2: 24 MEQ/L (ref 23–33)
CO2: 25 MEQ/L (ref 23–33)
CO2: 27 MEQ/L (ref 23–33)
CREAT SERPL-MCNC: 0.8 MG/DL (ref 0.4–1.2)
CREAT SERPL-MCNC: 0.9 MG/DL (ref 0.4–1.2)
CREAT SERPL-MCNC: 1.2 MG/DL (ref 0.4–1.2)
ERYTHROCYTE [DISTWIDTH] IN BLOOD BY AUTOMATED COUNT: 18.6 % (ref 11.5–14.5)
ERYTHROCYTE [DISTWIDTH] IN BLOOD BY AUTOMATED COUNT: 19.3 % (ref 11.5–14.5)
ERYTHROCYTE [DISTWIDTH] IN BLOOD BY AUTOMATED COUNT: 19.4 % (ref 11.5–14.5)
ERYTHROCYTE [DISTWIDTH] IN BLOOD BY AUTOMATED COUNT: 60.4 FL (ref 35–45)
ERYTHROCYTE [DISTWIDTH] IN BLOOD BY AUTOMATED COUNT: 61.9 FL (ref 35–45)
ERYTHROCYTE [DISTWIDTH] IN BLOOD BY AUTOMATED COUNT: 62.9 FL (ref 35–45)
GFR SERPL CREATININE-BSD FRML MDRD: 45 ML/MIN/1.73M2
GFR SERPL CREATININE-BSD FRML MDRD: 62 ML/MIN/1.73M2
GFR SERPL CREATININE-BSD FRML MDRD: 71 ML/MIN/1.73M2
GLUCOSE BLD-MCNC: 102 MG/DL (ref 70–108)
GLUCOSE BLD-MCNC: 102 MG/DL (ref 70–108)
GLUCOSE BLD-MCNC: 109 MG/DL (ref 70–108)
GLUCOSE BLD-MCNC: 131 MG/DL (ref 70–108)
GLUCOSE BLD-MCNC: 83 MG/DL (ref 70–108)
GLUCOSE BLD-MCNC: 88 MG/DL (ref 70–108)
GLUCOSE BLD-MCNC: 92 MG/DL (ref 70–108)
GLUCOSE BLD-MCNC: 95 MG/DL (ref 70–108)
GLUCOSE BLD-MCNC: 97 MG/DL (ref 70–108)
GLUCOSE BLD-MCNC: 97 MG/DL (ref 70–108)
HCT VFR BLD CALC: 23.3 % (ref 37–47)
HCT VFR BLD CALC: 23.3 % (ref 37–47)
HCT VFR BLD CALC: 27.6 % (ref 37–47)
HEMOGLOBIN: 7.2 GM/DL (ref 12–16)
HEMOGLOBIN: 7.3 GM/DL (ref 12–16)
HEMOGLOBIN: 8.7 GM/DL (ref 12–16)
MAGNESIUM: 2.2 MG/DL (ref 1.6–2.4)
MAGNESIUM: 2.3 MG/DL (ref 1.6–2.4)
MAGNESIUM: 2.4 MG/DL (ref 1.6–2.4)
MCH RBC QN AUTO: 28.3 PG (ref 26–33)
MCH RBC QN AUTO: 28.7 PG (ref 26–33)
MCH RBC QN AUTO: 28.7 PG (ref 26–33)
MCHC RBC AUTO-ENTMCNC: 30.9 GM/DL (ref 32.2–35.5)
MCHC RBC AUTO-ENTMCNC: 31.3 GM/DL (ref 32.2–35.5)
MCHC RBC AUTO-ENTMCNC: 31.5 GM/DL (ref 32.2–35.5)
MCV RBC AUTO: 91.1 FL (ref 81–99)
MCV RBC AUTO: 91.7 FL (ref 81–99)
MCV RBC AUTO: 91.7 FL (ref 81–99)
PHOSPHORUS: 2.3 MG/DL (ref 2.4–4.7)
PHOSPHORUS: 2.6 MG/DL (ref 2.4–4.7)
PHOSPHORUS: 2.9 MG/DL (ref 2.4–4.7)
PLATELET # BLD: 67 THOU/MM3 (ref 130–400)
PLATELET # BLD: 69 THOU/MM3 (ref 130–400)
PLATELET # BLD: 73 THOU/MM3 (ref 130–400)
PMV BLD AUTO: 10.3 FL (ref 9.4–12.4)
PMV BLD AUTO: 10.4 FL (ref 9.4–12.4)
PMV BLD AUTO: 9 FL (ref 9.4–12.4)
POTASSIUM REFLEX MAGNESIUM: 4 MEQ/L (ref 3.5–5.2)
POTASSIUM REFLEX MAGNESIUM: 4.1 MEQ/L (ref 3.5–5.2)
POTASSIUM REFLEX MAGNESIUM: 4.2 MEQ/L (ref 3.5–5.2)
RBC # BLD: 2.54 MILL/MM3 (ref 4.2–5.4)
RBC # BLD: 2.54 MILL/MM3 (ref 4.2–5.4)
RBC # BLD: 3.03 MILL/MM3 (ref 4.2–5.4)
RH FACTOR: NORMAL
SODIUM BLD-SCNC: 137 MEQ/L (ref 135–145)
SODIUM BLD-SCNC: 137 MEQ/L (ref 135–145)
SODIUM BLD-SCNC: 138 MEQ/L (ref 135–145)
TOTAL PROTEIN: 4.8 G/DL (ref 6.1–8)
TOTAL PROTEIN: 4.9 G/DL (ref 6.1–8)
TOTAL PROTEIN: 4.9 G/DL (ref 6.1–8)
WBC # BLD: 15.3 THOU/MM3 (ref 4.8–10.8)
WBC # BLD: 16 THOU/MM3 (ref 4.8–10.8)
WBC # BLD: 16.3 THOU/MM3 (ref 4.8–10.8)

## 2021-11-12 PROCEDURE — 71045 X-RAY EXAM CHEST 1 VIEW: CPT

## 2021-11-12 PROCEDURE — 97110 THERAPEUTIC EXERCISES: CPT

## 2021-11-12 PROCEDURE — 82330 ASSAY OF CALCIUM: CPT

## 2021-11-12 PROCEDURE — 36415 COLL VENOUS BLD VENIPUNCTURE: CPT

## 2021-11-12 PROCEDURE — 86850 RBC ANTIBODY SCREEN: CPT

## 2021-11-12 PROCEDURE — 82948 REAGENT STRIP/BLOOD GLUCOSE: CPT

## 2021-11-12 PROCEDURE — 2000000000 HC ICU R&B

## 2021-11-12 PROCEDURE — 99291 CRITICAL CARE FIRST HOUR: CPT | Performed by: INTERNAL MEDICINE

## 2021-11-12 PROCEDURE — C9113 INJ PANTOPRAZOLE SODIUM, VIA: HCPCS | Performed by: NURSE PRACTITIONER

## 2021-11-12 PROCEDURE — 94761 N-INVAS EAR/PLS OXIMETRY MLT: CPT

## 2021-11-12 PROCEDURE — 97167 OT EVAL HIGH COMPLEX 60 MIN: CPT

## 2021-11-12 PROCEDURE — 2580000003 HC RX 258: Performed by: STUDENT IN AN ORGANIZED HEALTH CARE EDUCATION/TRAINING PROGRAM

## 2021-11-12 PROCEDURE — 86900 BLOOD TYPING SEROLOGIC ABO: CPT

## 2021-11-12 PROCEDURE — 2500000003 HC RX 250 WO HCPCS: Performed by: STUDENT IN AN ORGANIZED HEALTH CARE EDUCATION/TRAINING PROGRAM

## 2021-11-12 PROCEDURE — 2580000003 HC RX 258: Performed by: EMERGENCY MEDICINE

## 2021-11-12 PROCEDURE — 86901 BLOOD TYPING SEROLOGIC RH(D): CPT

## 2021-11-12 PROCEDURE — 6370000000 HC RX 637 (ALT 250 FOR IP): Performed by: FAMILY MEDICINE

## 2021-11-12 PROCEDURE — 36592 COLLECT BLOOD FROM PICC: CPT

## 2021-11-12 PROCEDURE — 84100 ASSAY OF PHOSPHORUS: CPT

## 2021-11-12 PROCEDURE — 6370000000 HC RX 637 (ALT 250 FOR IP): Performed by: INTERNAL MEDICINE

## 2021-11-12 PROCEDURE — 99232 SBSQ HOSP IP/OBS MODERATE 35: CPT | Performed by: PHYSICIAN ASSISTANT

## 2021-11-12 PROCEDURE — 6360000002 HC RX W HCPCS: Performed by: STUDENT IN AN ORGANIZED HEALTH CARE EDUCATION/TRAINING PROGRAM

## 2021-11-12 PROCEDURE — P9016 RBC LEUKOCYTES REDUCED: HCPCS

## 2021-11-12 PROCEDURE — 99233 SBSQ HOSP IP/OBS HIGH 50: CPT | Performed by: INTERNAL MEDICINE

## 2021-11-12 PROCEDURE — 86923 COMPATIBILITY TEST ELECTRIC: CPT

## 2021-11-12 PROCEDURE — 6360000002 HC RX W HCPCS: Performed by: NURSE PRACTITIONER

## 2021-11-12 PROCEDURE — 2700000000 HC OXYGEN THERAPY PER DAY

## 2021-11-12 PROCEDURE — 83735 ASSAY OF MAGNESIUM: CPT

## 2021-11-12 PROCEDURE — 92526 ORAL FUNCTION THERAPY: CPT

## 2021-11-12 PROCEDURE — 94640 AIRWAY INHALATION TREATMENT: CPT

## 2021-11-12 PROCEDURE — 85027 COMPLETE CBC AUTOMATED: CPT

## 2021-11-12 PROCEDURE — 80053 COMPREHEN METABOLIC PANEL: CPT

## 2021-11-12 RX ORDER — MIDODRINE HYDROCHLORIDE 10 MG/1
10 TABLET ORAL
Status: DISCONTINUED | OUTPATIENT
Start: 2021-11-12 | End: 2021-11-12

## 2021-11-12 RX ORDER — SODIUM CHLORIDE 9 MG/ML
INJECTION, SOLUTION INTRAVENOUS PRN
Status: COMPLETED | OUTPATIENT
Start: 2021-11-12 | End: 2021-11-12

## 2021-11-12 RX ORDER — MIDODRINE HYDROCHLORIDE 10 MG/1
10 TABLET ORAL EVERY 8 HOURS
Status: DISCONTINUED | OUTPATIENT
Start: 2021-11-12 | End: 2021-11-19

## 2021-11-12 RX ORDER — GLYCOPYRROLATE 1 MG/1
1 TABLET ORAL 3 TIMES DAILY
Status: DISCONTINUED | OUTPATIENT
Start: 2021-11-12 | End: 2021-11-12

## 2021-11-12 RX ADMIN — CLINDAMYCIN PHOSPHATE 600 MG: 600 INJECTION, SOLUTION INTRAVENOUS at 23:27

## 2021-11-12 RX ADMIN — SODIUM CHLORIDE, PRESERVATIVE FREE 10 ML: 5 INJECTION INTRAVENOUS at 20:35

## 2021-11-12 RX ADMIN — SODIUM CHLORIDE SOLN NEBU 3% 4 ML: 3 NEBU SOLN at 06:08

## 2021-11-12 RX ADMIN — SODIUM CHLORIDE SOLN NEBU 3% 4 ML: 3 NEBU SOLN at 18:39

## 2021-11-12 RX ADMIN — SODIUM CHLORIDE: 9 INJECTION, SOLUTION INTRAVENOUS at 13:11

## 2021-11-12 RX ADMIN — SODIUM CHLORIDE 0.3 MCG/KG/HR: 9 INJECTION, SOLUTION INTRAVENOUS at 03:13

## 2021-11-12 RX ADMIN — ALBUTEROL SULFATE 2.5 MG: 2.5 SOLUTION RESPIRATORY (INHALATION) at 13:31

## 2021-11-12 RX ADMIN — Medication: at 01:05

## 2021-11-12 RX ADMIN — MIDODRINE HYDROCHLORIDE 10 MG: 10 TABLET ORAL at 10:47

## 2021-11-12 RX ADMIN — ALBUTEROL SULFATE 2.5 MG: 2.5 SOLUTION RESPIRATORY (INHALATION) at 18:36

## 2021-11-12 RX ADMIN — ALBUTEROL SULFATE 2.5 MG: 2.5 SOLUTION RESPIRATORY (INHALATION) at 00:46

## 2021-11-12 RX ADMIN — CLINDAMYCIN PHOSPHATE 600 MG: 600 INJECTION, SOLUTION INTRAVENOUS at 07:05

## 2021-11-12 RX ADMIN — SODIUM CHLORIDE, PRESERVATIVE FREE 10 ML: 5 INJECTION INTRAVENOUS at 10:08

## 2021-11-12 RX ADMIN — SODIUM CHLORIDE SOLN NEBU 3% 4 ML: 3 NEBU SOLN at 00:46

## 2021-11-12 RX ADMIN — ALLOPURINOL 300 MG: 300 TABLET ORAL at 10:07

## 2021-11-12 RX ADMIN — TIOTROPIUM BROMIDE AND OLODATEROL 2 PUFF: 3.124; 2.736 SPRAY, METERED RESPIRATORY (INHALATION) at 06:17

## 2021-11-12 RX ADMIN — CLINDAMYCIN PHOSPHATE 600 MG: 600 INJECTION, SOLUTION INTRAVENOUS at 15:38

## 2021-11-12 RX ADMIN — SODIUM CHLORIDE 0.3 MCG/KG/HR: 9 INJECTION, SOLUTION INTRAVENOUS at 04:14

## 2021-11-12 RX ADMIN — PANTOPRAZOLE SODIUM 40 MG: 40 INJECTION, POWDER, FOR SOLUTION INTRAVENOUS at 10:07

## 2021-11-12 RX ADMIN — SODIUM CHLORIDE 0.2 MCG/KG/HR: 9 INJECTION, SOLUTION INTRAVENOUS at 19:02

## 2021-11-12 RX ADMIN — SODIUM HYPOCHLORITE: 1.25 SOLUTION TOPICAL at 08:21

## 2021-11-12 RX ADMIN — ALBUTEROL SULFATE 2.5 MG: 2.5 SOLUTION RESPIRATORY (INHALATION) at 06:07

## 2021-11-12 RX ADMIN — Medication: at 01:04

## 2021-11-12 RX ADMIN — SODIUM CHLORIDE SOLN NEBU 3% 4 ML: 3 NEBU SOLN at 13:32

## 2021-11-12 RX ADMIN — MIDODRINE HYDROCHLORIDE 10 MG: 10 TABLET ORAL at 20:35

## 2021-11-12 ASSESSMENT — PAIN SCALES - GENERAL
PAINLEVEL_OUTOF10: 0

## 2021-11-12 NOTE — PROGRESS NOTES
[address] [facility] [address] THERAPY  [facility] ICU 4D  EVALUATION    Time:   Time In: 4566  Time Out: 1143  Timed Code Treatment Minutes: 12 Minutes  Minutes: 24          Date: 2021  Patient Name: Cb Kenny,   Gender: female      MRN: [id number]  : 1950  (73 y.o.)  Referring Practitioner: Kvng Silva DO  Diagnosis: Acute respiratory failure  Additional Pertinent Hx: Per EMR:71F admitted to [facility] 10/27/21 w/ SOB. Current smoker w/ PMH PAH grp 3, HFpEF, stage III sacral ulcer s/p wound ostomy . Patient sister reports SpO2 51% at home and was brought to ED where She required emergent intubation and was transferred to ICU. Workup revealed left sided pleural effusion and underwent US guided thoracentesis. Pt required cardioversion on 11/3, CRRT -, extubated on 11/10, and bronch on  d/t left lung collapse; found to have left main stem mucous plug which was removed. Restrictions/Precautions:  Restrictions/Precautions: General Precautions, Fall Risk  Position Activity Restriction  Other position/activity restrictions: HFNC, rectal tube, NG, nephrostomy tube    Subjective  Chart Reviewed: Yes, Orders, Progress Notes, History and Physical  Patient assessed for rehabilitation services?: Yes  Family / Caregiver Present: Yes (brother)    Subjective: Pt supine in bed with HOB elevated upon arrival, significantly drowsy/lethargic initially although did awaken more as session progressed. Pt agreeable to OT session. Significant pitting edema noted to all 4 extremities.     Pain:  Pain Assessment  Patient Currently in Pain: Denies    Vitals: Blood Pressure: 112/47  Oxygen: 97% on HFNC at 40 LPM and 65% Fio2  Heart Rate: 67 bpm  All vitals remained stable throughout    Social/Functional History:  Lives With: Family (brother)  Type of Home: House  Home Layout: One level  Home Equipment: Rolling walker, [employer]   Bathroom Equipment: Shower chair       ADL Assistance: Independent  Homemaking Assistance: Needs assistance (family completes all)  Ambulation Assistance: Independent  Transfer Assistance: Independent          Additional Comments: Brother reported pt has been using RW since 08/21 when had surgery/wound vac for sacral decubitus. Brother reported prior to surgery pt was walking 3,000 steps/day, although since surgery does minimal activity. VISION:Corrected    HEARING:  WFL    COGNITION: Slow Processing, Decreased Recall, Impaired Memory and Difficulty Following Commands    RANGE OF MOTION:  Bilateral Upper Extremity:  Impaired - grossly decreased at all joints due to significant weakness and edema    STRENGTH:  Bilateral Upper Extremity:  Impaired - 2+/5 at shoulders/elbows; 3/5 at wrist/digits    ADL:   No ADL's completed this session. .  Anticipate pt would require max-total assist with all ADLs due to significant weakness/deconditioning. NPO with NG tube in place. BALANCE:  OTR to further assess as able/appropriate    BED MOBILITY:  OTR to further assess as able/appropriate    TRANSFERS:  OTR to further assess as able/appropriate    FUNCTIONAL MOBILITY:  OTR to further assess as able/appropriate    Exercise:  Pt completed BUE/BLE AAROM exercises while supine in bed. Pt completed 1 set X 10 repetitions for UB, X5 reps for LB in all planes with short rest breaks in between variations. Pt required minimal-moderate assist/support for UB exercises, max assist for LB exercises. Exercises completed to increase overall strength/endurance needed for ADLs and transfers. Activity Tolerance:  Patient tolerance of  treatment: poor. Significantly deconditioned, vitals remained stable. Unable to tolerate any attempts at bed mobility/EOB at this time. Assessment:  Assessment: Pt presents requiring increased assistance for ADLs, transfers, and functional mobility compared to PLOF.  Pt will continue to benefit from OT services to improve independence with these tasks, in addition to overall strength/endurance to facilitate return to PLOF. Performance deficits / Impairments: Decreased functional mobility , Decreased ADL status, Decreased ROM, Decreased strength, Decreased cognition, Decreased endurance, Decreased balance  Prognosis: Poor  REQUIRES OT FOLLOW UP: Yes  Decision Making: High Complexity    Treatment Initiated: Treatment and education initiated within context of evaluation. Evaluation time included review of current medical information, gathering information related to past medical, social and functional history, completion of standardized testing, formal and informal observation of tasks, assessment of data and development of plan of care and goals. Treatment time included skilled education and facilitation of tasks to increase safety and independence with ADL's for improved functional independence and quality of life. Discharge Recommendations:  ECF with OT, LTACH (pending medical/oxygen needs)    Patient Education:  OT Education: OT Role, Plan of Care, Home Exercise Program (increasing activity as able)    Equipment Recommendations:  Equipment Needed: No  Other: defer to next level of care    Plan:  Times per week: 3-5x  Current Treatment Recommendations: Strengthening, ROM, Balance Training, Functional Mobility Training, Endurance Training, Patient/Caregiver Education & Training, Equipment Evaluation, Education, & procurement, Self-Care / ADL. See long-term goal time frame for expected duration of plan of care. If no long-term goals established, a short length of stay is anticipated. Goals:     Short term goals  Time Frame for Short term goals: by discharge  Short term goal 1: Pt will tolerate further assessment of EOB sitting balance, transfers, and functional mobility by OTR when appropriate. Short term goal 2: Pt will complete grooming tasks with minimal assistance to increase independence with self care tasks.   Short term goal 3: Pt will complete BUE A/AROM exercises X10-15 reps to increase overall strength/endurance needed for UB grooming/eventual feeding tasks. Following session, patient left in safe position with all fall risk precautions in place.

## 2021-11-12 NOTE — PROGRESS NOTES
[address] [place] [facility]  STR ICU 4D  DAILY NOTE    TIME   SLP Individual Minutes  Time In: 1010  Time Out: 1021  Minutes: 11  Timed Code Treatment Minutes: 0 Minutes       Date: 2021  Patient Name: Lyssa Longoria      CSN: [id number]   : 1950  (73 y.o.)  Gender: female   Referring Physician: Placido Frankel, DO  Diagnosis: Acute respiratory failure   Secondary Diagnosis: Dysphagia   Precautions: Fall risk, aspiration precautions  Current Diet: NPO; TF via NGT  Swallowing Strategies: Not Applicable  Date of Last MBS/FEES: Not Applicable    Pain:  No pain reported. Subjective:  NOAM Suggs with approval to proceed with session. Upon arrival, patient resting in bed, limited engagement within immediate surroundings. Family present at bedside with active participation throughout interventions. HHFNC: 40 LPM, 65% FiO2    Short-Term Goals:  Goal 1: Patient will consume PO trials of ice chips, thin liquids, and purees (as deemed clinically indicated) demonstrating consistency for pharyngeal swallow trigger, endurance, and stable RR to determine readiness for PO diet level initiation vs completion of instrumental evaluation. Goal 2: Staff will exhibit return demonstration for completion of oral care to assist with maximizing oral integrity and to reduce potential bacteria colonization. Interventions:  Comprehensive oral care completed via toothettes+hydrogen peroxide rinse with patient exhibiting positive toleration to all quadrants/regions; oral cavity appearing pink/moist with no hardened secretions (NOAM Suggs reporting completion of oral care earlier at date).      PO trials henceforth administered via direct 1:1 assistance to determine readiness for formal instrumentation:  Ice chips: x3 trials, consistent delayed attempts to masticate and manipulate bolus with eventual transgressions of conservative boli to L lateral sulcus; anterolateral bolus loss during attempted management of viscous. Concerns for premature spillage/pharyngeal entry given deficits for bolus control with spontaneous/multiple swallows produced (x3-4); immediate cough reflex produced x1 (final trial) with concerns for airway invasion event; patient with deferral for further intake. Despite improved RR at date (ranging 22-26) and ability to engage in dysphagia interventions, ongoing concerns maintain for laryngeal penetration/tracheal aspiration given likelihood for some degree of disuse atrophy resulting in deficits of the pharyngeal musculature. Recommendations to maintain NPO diet level, TF via NGT. Will continue to prioritize dysphagia interventions as schedule permits. *education provided to family members (sister, brother, brother-in-law) re: clinical findings at date, s/s aspiration with potential implications, and dysphagia management POC; receptiveness noted  **post session, patient without respiratory distress upon leaving room; RN Carisa Blackwell notified re: clinical findings and recommendations; verbal receptiveness noted       Long-Term Goals:  No LTG established given short ELOS       EDUCATION:  Learner: Patient and Family  Education:  Reviewed diet and strategies, Reviewed signs, symptoms and risks of aspiration, Reviewed ST goals and Plan of Care, Reviewed recommendations for follow-up and Education Related to Potential Risks and Complications Due to Impairment/Illness/Injury  Evaluation of Education: Verbalizes understanding, Demonstrates with assistance and Needs further instruction    ASSESSMENT/PLAN:  Activity Tolerance:  Patient tolerance of  treatment: fair. Limited by endurance and medical complexity. Assessment/Plan: Patient progressing toward established goals. Continues to require skilled care of licensed speech pathologist to progress toward achievement of established goals and plan of care. .     Plan for Next Session: oral care, PO trials       Jovanna Spears M.A., CCC-SLP [id number] done

## 2021-11-12 NOTE — PROGRESS NOTES
CRITICAL CARE PROGRESS NOTE      Patient:  Cb Kenny    Unit/Bed:4D-09/009-A  YOB: 1950  MRN: [id number]   PCP: Elinor Paulson MD  Date of Admission: 10/27/2021  Chief Complaint:- Shortness of breath    Assessment and Plan:    1. Acute combined hypercapnic and hypoxic respiratory failure: Secondary to severe pneumonia with left-sided pleural effusion. Intubated 10/27/21. Extubated following successful SBT on 11/9/21. Will continue BiPAP while asleep and wean to NC while awake. Worsening respiratory status this AM w/ SpO2 < 80% on HFNC and tachypnea. Resumed BiPAP  2. Severe bilateral multiorganism pneumonia: PCR positive Staph w/ MECA gene and adenovirus consistent w/ MRSA colonization. Culture growing MSSA. Repeat culture 11/5/21 shows coag positive staph completed 14 days Vancomycin (started 10/28/21) and completed 5 days Rocephin Clindamycin started for coverage of PVL as patient clinically worsening following 14 days Vancomycin for MRSA coverage. Underwent bronchoscopy for left mainstem mucus plug noted CXR on 11/11/21. Daily CXR. Day 2/8 clindamycin Repeat bronchoscopy planned today  3. Septic Shock: 2/2 severe PNA. CI 6.3 NE d/c 2/2 worsening afib RVR. NE d/c on 11/3/21 2/2 worsening afib/RVR. Currently on pressor support w/ phenylephrine. Suspect severe PNA as source. Initial concern for pyelonephritis however cultures growing staph Epidermidis suspect contaminant  4. Stage II ALVIN on CKD 3: Suspect post renal etiology 2/2 staghorn calculi. Nephrology following. CRRT initiated 11/5/21 will continue for UF removal. 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 ANCA associated Abs show elevated anti MPO elevated 416 and serine proteinase 3 IgG WNL. Will need Bx for confirmation. Mitochondrial Abs pending, Anti-DS-DNA negative, Anti histone negative, Anti Smith,and  Anti-Savana WNL,  GBM antibodies negative, C3/C4 levels normal, elevated kappa/lambda free light chains with normal ratio.  No plans per Bx per nephrology. Fluid collection noted on left renal unlikely abscess as patient remains afebrile. 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 Will need Renal Bx for confirmation once stable. Discussed case w/ Nephrology. Completed pulse dose steroids starting on 11/8/21 w/  10mg/kg methylprednisolone for 3 days. SSI started for coverage. Not candidate for Rituxin/TPE 2/2 existing infection. 6. Hydronephrosis 2/2 Left-sided staghorn calculi: Urology following. Percutaneous drainage tube 11/2/21 IR. Low output following tube placement. Increased flow on CBI per urology recs. Still flushing clots. Perinephric stranding noted on CT abdomen and pelvis w/ air fluid region discontiguous with region of nephrostomy tube insertion raised initial concern for abscess (<3 cm) vs emphysematous pyelonephritis when discussed with radiologist. Received 5 days empiric therapy w/ cefepime (started 11/5/21). On Vancomycin per above. Culture of nephrostomy tube aspirate from 11/5/21 shows staph epidermidis (vancomycin sensitive). Suspect nephrostomy tube colonization. D/c cefepime  7. Acute blood loss anemia: Suspect consumptive process. Leading differential , smear, reticulocytes, LDH, Haptoglobin, MERCEDES, B12/folate  pendingsuspect 2/2 hemolysis on CRRT. PRBC x1 transfused 10/31/21, 11/4/21, 11/5/21, x2 11/6/21, x3 11/8/21. Heparin stopped (dialysis dose still running). Holding ASA. 1 unit pending 11/12/21  8. Thrombocytopenia: suspect consumptive process. Workup per above. Not heparin per above. Platelets 84 99/6/61.   9. Hematuria: s/p perc tube placement. CBI per above. Stopped heparin, holding ASA. 10. Suspected Dysphagia: 2/2 prolonged intubation. SLP eval pending  11. Bilateral Pleural Effusion:  2/2 PNA per above with associated volume overload in context of renal failure. Interval enlargement noted on CT 11/1/21. Improvemed in concomitant pericardial effusion. CRRT started for UF removal  12.  Suspected COPD: current every day smoker. Obstructive process noted on flow volume loop on ventilator. Started empiric therapy with Stiolto. Recommend formal PFT once improved  13. PAH/chronic RV failure NYHA II: EF 55 to 60% G2 DD TTE 5/4/2021. RHC showed [place] 30 with elevated PCWP. Treated w/ Riociguat. 14. Normocytic Anemia: 2/2 hematuria s/p perc tube placement w/ Iron studies consistent w/  MERCEDES vs Anemia chronic disease. B12 WNL, Folate low, Absreticulocyte index 1.9% Soluable transferrin pending. Calculated Iron deficit 827mg. Will start Venofer replace folate once ABx stopped  15. Cholelithiasis: w/ dilated CBD. Noted on 10/30/2021 US liver/GB. GI following. Does not suspect choledocolithiasis at this time. May consider HIDA scan once stable   16. Leukocytosis: 2/2 to pulse dose steroids per above  17. Stage III decubitus ulcer with inferior tunneling: S/p excision VAC placement 8/21. Low suspicion for active infection  18. Moderate malnutrition: Pre-albumin 14.4 w/ notable cachexia  19. Chronic tobacco use:   cessation  20. Mild AS: Noted on previous TTE not appreciated on repeat imaging  21. Gout:  W/o exacerbation. Continue allopurinol  22. BARBER: non compliant w/ CPAP  23. New onset atrial fibrillation with rapid ventricular response (resolved): now in NSR s/p DCCV 11/3/21 Stopped Heparin per hematuria w/ anemia requiring transfusion & and now in NSR. Stopped Amiodarone 11/8/21. Holding metoprolol 2/2 hypotension  24. Hyperkalemia (resolved): 2/2 ALVIN  25. Hyponatremia (resolved): Suspect 2/2 renal failure. Unable to obtain urine Na 2/2 bladder irrigation. Bumex resolved w/ CRRT  26. Nutrition: on TF @30 ml       INITIAL H AND P AND ICU COURSE:  71F admitted to [facility] 10/27/21 w/ SOB. Current smoker w/ PMH PAH grp 3, HFpEF, stage III sacral ulcer s/p wound ostomy 9/21. Patient sister reports SpO2 51% at home and was brought to ED where ABG showed pH 7.19, PCO2 80, PO2 134.  She required emergent intubation and was transferred to ICU. Workup revealed left sided pleural effusion and underwent US guided thoracentesis w/ 1100 cc removed consistent w/ MRSA/adenovirus. Patient was noted to be in afib/RVR and was placed on amio, heparin drip. Patient was also noted to have normocytic anemia and received 1 unit PRBC 10/31/21. CT abdomen revealed CBD dilation w/ cholelithiasis. 11/1/2021: Plan for nephrostomy tubes today. Hemoglobin 7.7 s/p 1 unit PRBC yesterday. Weaned to 4 mics Levophed. 11/2/2021: Patient resumed back on prior dose of vancomycin. Low urine output w/ increasing pressor requirements today    11/3/2021: Will attempt SBT if able to wean today. Increased Amio drip 2/2 worsening tachycardia. Continued hematuria w/ low urine output. Cr stable. Will evaluate UTI following perc tube placement. Rise in WBC noted. Culture pending    11/4/2021: Cardioversion on 11/3/2021. Now and NSR. CVP 29 this a.m. Suspect drop in CI 2/2 to stopping levophed. Diffuse edema following transfusion overnight. Given one-time dose of 2 mg Bumex and albumin. SBT this AM    11/5/2021: Failed SBT yesterday. Continued low urine output. Trial of Bumex today. Will proceed with dialysis if fails to improve. 11/6/2021: Started on CRRT, continues to have bloody drainage of nephrostomy tube given 1 unit PRBC, cefipime emperically pending cultures of nephrostomy tube fluid and repeat resp cultures. 11/7/21: Patient remains clinically well on exam.  We'll continue medical of UF with CRRT. On empiric cefepime for coverage of perinephric abscess noted on CT abdomen and pelvis November 4, 2021. White count trending down. Will attempt spontaneous breathing trial in a.m.    11/8/21: Transfused 1x PRBC this AM. Anemia workup pending. WBC trending down. Patient ventilator settings this AM preclude SBT. Volume overloaded on exam. UF increased to 125 cc/hr this AM. Will attempt to wean vent settings further further.  Coag neg staph growing on nephrostomy tube aspirate. Suspect contamination. Continue existing ABx    11/9/21: Pulse dose steroids initiated overnight. Started on SSI. Urine output now improving 75cc/hr. Transfused 3 units PRBCs yesterday for Improve DO2. MERCEDES noted w/ 875 mg deficit. Will replace once steroids/ABx completed. 11/10/21 Extubated overnight. SLP michelle today. Continue BiPAP while asleep w/ transition to NC while awake as tolerated. No tachypnea. Day 3 pulse dose steroids. Continued bloody drainage from nephrostomy tube. Holding [facility]     11/11/21 Worsening bilateral infiltrates with SpO2 <90% on HFNC. Continued pulmonary hygiene w/ nebulized hypertonic saline, acapella and breathing treatments in addition to deep suctioning. Continued on Vancomycin for MRSA PNA. Plan for family meeting this AM to discuss goals of care. 11/12/21 Follow-up discussion from family meeting. Patient nodded agreement with full code status including reintubation leading to tracheostomy and PEG placement and continued hemodialysis if needed. Patient nodded agreement and understanding with these decisions. CXR yesterday evening did show left mainstem mucus plugging with cutoff sign and absent breath sounds and did undergo emergent bronchoscopy overnight on 11/12/21. Diminished breath sounds and worsening respiratory status this AM concerning for continued plugging. Stat chest X-ray ordered for confirmation. Will proceed with bronchoscopy. Edema gradually improving urine output now 30 cc/hr. 1g drop in Hb overnight. Transfusion planned for today    Past Medical History:  Per HPI. Family History:  DM in father and sister. Social History: chronic smoker.     ROS   Patient reports fatigue,  A 12 point review of systems was otherwise negative    Scheduled Meds:   clindamycin (CLEOCIN) IV  600 mg IntraVENous Q8H    ketamine  50 mg IntraVENous Once    midazolam  2 mg IntraVENous Once    sodium chloride (Inhalant)  4 mL Nebulization 4 times per day    And  albuterol  2.5 mg Nebulization 4 times per day    insulin lispro  0-6 Units SubCUTAneous Q4H    phosphorus replacement protocol   Other RX Placeholder    potassium bicarb-citric acid  40 mEq Oral Once    tiotropium-olodaterol  2 puff Inhalation Daily    [Held by provider] aspirin  81 mg Oral Daily    pantoprazole  40 mg IntraVENous QAM    sodium hypochlorite   Irrigation Daily    Riociguat  2.5 mg Oral Q8H    sodium chloride flush  5-40 mL IntraVENous 2 times per day    allopurinol  300 mg Oral Daily    [Held by provider] FLUoxetine  40 mg Oral Daily    [Held by provider] metoprolol tartrate  12.5 mg Oral BID     Continuous Infusions:   phenylephrine (KRISTAL-SYNEPHRINE) 50mg/250mL infusion Stopped (11/12/21 0204)    sodium chloride      dextrose      dexmedetomidine 0.4 mcg/kg/hr (11/12/21 0600)    heparin 5000 units CRRT syringe 1 mL/hr at 11/11/21 0110    bumetanide 0.1 mg/mL infusion 0.5 mg/hr (11/05/21 0904)    prismaSATE BGK 4/2.5 250 mL/hr at 11/11/21 1353    prismaSol BGK 4/2.5 500 mL/hr at 11/11/21 1352    prismaSol BGK 4/2.5 500 mL/hr at 11/12/21 0105    sodium chloride Stopped (11/12/21 0200)    [Held by provider] sodium chloride 50 mL/hr at 10/28/21 2314       PHYSICAL EXAMINATION:  T:  98.2. P: 73 RR:  22. B/P: 104/47 FiO2: 65. O2 Sat:  95%. I/O: 656/2530 net -1873  Body mass index is 38.79 kg/m². GCS:   8  PC: 16 PEEP: 8: TV: 400: RRTotal: 11: General: Acute on chronically ill-appearing elderly white female  HEENT:  normocephalic and atraumatic. No scleral icterus. PERR  Neck: supple. No Thyromegaly. Left subclavian dialysis catheter   Lungs: Diffuse rhonchi appreciated. Absent left lower lobe breath sounds. No retractions  Cardiac: RRR. No JVD. Abdomen: soft. Nontender. ,npehrostomy tube with bloody drainage  Extremities:  +1 pitting edema x4. (interval improvement noted) No clubbing, cyanosis   Vasculature: capillary refill < 3 seconds.  Palpable dorsalis pedis pulses. Skin:  warm and dry. Psych: Alert and oriented to person place and time, affect appropriate  Lymph:  No supraclavicular adenopathy. Neurologic:  No focal deficit. No seizures. Data: (All radiographs, tracings, PFTs, and imaging are personally viewed and interpreted unless otherwise noted).  CMP: Sodium 138 potassium 4.2 chloride 104 bicarb 25 BUN/creatinine 26/0.8 anion gap 9 ionized calcium 1.32 GFR 71 magnesium 2.4 glucose 102 calcium 8.6 phosphorus 2.6 albumin 3.1 alk phos 82 ALT/AST 10/9 total bilirubin 1.9 total protein 4.9   CBC WBC 15.3 H&H 7.3/23.3 platelets 73   SARS-CoV-2 NAAT negative on 10/27/2021   Telemetry shows NSR   Chest x-ray 10/31/2021 demonstrated diffuse bilateral patchy opacities with left-sided pleural effusion and cardiomegaly   Respiratory culture  11/5/21 pending   Pneumonia panel collected 10/27/2021 demonstrates staph aureus. MEC-A gene and adenovirus   CT chest on 11/1/2021 demonstrates bilateral pleural effusions with interval enlargement   CXR 11/3/2021 demonstrates worsening pulmonary congestion with bilateral effusions   UA shows bacteria w/ moderate white cells   CT Abdomen and pelvis 11/5/21 showed perinephric stranding with left-sided hypodense nodule with air-fluid level concerning for abscess. Diffuse ascites and anasarca noted.  CXR November 7, 2021 shows bilateral pulmonary edema with worsening right-sided infiltrate   CXR November 9, 2021 shows improvement in bilateral pulmonary edema with worsening left sided infiltrate    Repeat CXR 11/12/21 pending at time of evaluation. Seen with multidisciplinary ICU team.  Meets Continued ICU Level Care Criteria:    [x] Yes   [] No - Transfer Planned to listed location:  [] HOSPITALIST CONTACTED-        Electronically signed by Tylor Byrnes DO PGY-2 on 11/12/2021 at 6:26 AM  Patient seen by me. Case discussed with resident physician. Continue with hypertonic saline nebs. Initiate chest percussion therapy. Patient may require repeat bronchoscopy in the morning if she is unable to adequately clear mucus from the left lung fields. Continue with Precedex and CRRT. Bronchoscopy PCR positive for MRSA. Patient on clindamycin. Italicized font represents changes to the note made by me. CC time 35 minutes. Time was discontiguous. Time does not include procedures. Time does include my direct assessment of the patient and coordination of care.   Electronically signed by Wesley Dotson MD on 11/12/2021 at 6:22 PM

## 2021-11-12 NOTE — CARE COORDINATION
11/12/21, 11:57 AM EST    DISCHARGE ON GOING EVALUATION    Rosamaria Key day: 16  Location: -09/009-A Reason for admit: Acute respiratory failure ([address] [J96.00]  Flash pulmonary edema ([address] [J81.0]  Acute respiratory failure with hypercapnia ([address] [J96.02]   Procedure:   10/27 CXR: Near complete opacification of the left hemithorax with very little aerated lung visualized in the left upper lobe. Small right pleural effusion and right lower lobe consolidation obscures visualization of the right hemidiaphragm pulmonary vascular congestion is observed  10/27 Intubated  10/27 CVC right subclavian  10/27 IR: Left thoracentesis with 1.1L removed  10/28 EEG: no definitive evidence of epileptiform activity appreciated. 10/29 Cardiac Cath with SGC and michelle placed - removed 11/4  10/31 CT Abd/pelvis:   1. Bilateral pleural effusions and abnormal densities in the right and left lower lobes consistent with inflammatory process. 2. Cardiomegaly. Small pericardial effusion. 3. Small amount of fluid surrounding the liver. 4. Gallstone. Increased attenuation in the gallbladder. No pericholecystic fluid or biliary dilatation. 5. Large staghorn calculus in the left kidney. Severe left-sided hydronephrosis and hydroureter. Increased density in the left perinephric fat consistent with inflammatory process. 6. Atherosclerotic calcification in the abdominal aorta, iliac and femoral arteries. 7. Padilla catheter within the bladder. 8. Lumbar spondylosis. 9. Increased density in the skin and subcutaneous soft tissues suggestive of edema or anasarca. 10. Enteric tube.  P[place]-Kasia catheter in place, seen better on plain radiographs     11/1 CT Chest: Moderate bilateral pleural effusions have increased in size in the interval with associated adjacent atelectasis; Persistent pulmonary vascular congestion/edema with cardiomegaly; Decreased pericardial effusion  11/1 Left Nephrostomy tube placed in IR  11/3 Cardioverted x1 to NSR  11/4 CT Abd/pelvis:   1. Interval percutaneous left nephrostomy tube placement with small amount of blood in the renal pelvis about the nephrostomy tube. 2. Hypodense lesion in the anterior aspect of the interpolar region of the left kidney with small focus of air at the margin. Developing abscess can't be excluded. 3. Scattered foci of air elsewhere in the kidney likely sequelae of nephrostomy tube placement. 4. Mildly worsening anasarca with persistent small to moderate ascites and moderate pleural effusions which may be cardiogenic given cardiomegaly. 5. Worsening opacities adjacent to the pleural effusions as evidence for worsening atelectasis or infiltrates. 6. Small hemopericardium, stable compared to prior exam.   7. Stable retroperitoneal periaortic lymphadenopathy at the level of the left kidney     11/5 TESSIO left subclavian  11/5 CRRT initiated  11/9 Extubated  11/11 Bronch: Left main stem mucous plug; removed. 11/12 CRRT stopped  11/12 CXR:   1. Possible interval decrease in left lung volume secondary to atelectasis    or lobar collapse, consolidation of the parenchyma, or enlargement pleural    effusion. Further evaluation with cross-sectional imaging may be    considered to better characterize. There is also decreased lung volume on    the right with dense consolidation of the lower lung. These findings may    reflect acute respiratory distress syndrome, or multifocal pneumonia     Barriers to Discharge: Bronch done yesterday d/t left lung collapse; found to have left main stem mucous plug which was removed. CRRT clotted off this morning; plan to hold for now and monitor UO and renal function. To get 1 PRBC today. CBI continues. On HFNC, 40L, 65% FIO2, sats 92%. Bipap at HS and PRN. Afebrile. NSR. Oriented to person and place. Follows commands. Intensivist, Cardiology, GI, Nephrology, and Urology following. Respiratory culture +MRSA and adenovirus by PCR. SLP/PT/OT. Telemetry, CVC, TESSIO, michelle, NG w/TF @ 10 ml/hr, left nephrostomy tube, flexiseal, seaman, SCDs. Precedex @ 0.4 mcg/kg/hr, addie @ 20 mcg/min, allopurinol, asa, IV clindamycin, midodrine, IV protonix, riociguat, dakins daily, Electrolyte replacement protocols. Phos 2.3, alb 3.1, wbc 16.3, hgb 7.2, plt 67.      PCP: Maricruz Arroyo MD  Readmission Risk Score: 18.7 ( )%  Patient Goals/Plan/Treatment Preferences: From home alone and current with [facility]. SW on case. Monitor for possible needs. Monitor PT/OT evals for possible needs.

## 2021-11-12 NOTE — PROGRESS NOTES
[facility]         Renal Inpatient Follow-Up note         11/12/2021 8:28 AM    Pt Name:   Lou Powell:   1950  Attending:   Jacob Bradford MD    Chief Complaint : Cee Dueñas is a 73 y.o. female being followed by nephrology for ALVIN/CKD    Interval History :   Patient seen and examined by me. No distress  Extubated and somewhat confused cannot assess history or review of systems  Having some urine output . CVVH running well with 100 mL of ultrafiltration per hour  Still on Benjamin-Synephrine at a low-dose.      Scheduled Medications :    midodrine  10 mg Oral Q8H    clindamycin (CLEOCIN) IV  600 mg IntraVENous Q8H    ketamine  50 mg IntraVENous Once    midazolam  2 mg IntraVENous Once    sodium chloride (Inhalant)  4 mL Nebulization 4 times per day    And    albuterol  2.5 mg Nebulization 4 times per day    insulin lispro  0-6 Units SubCUTAneous Q4H    phosphorus replacement protocol   Other RX Placeholder    potassium bicarb-citric acid  40 mEq Oral Once    tiotropium-olodaterol  2 puff Inhalation Daily    [Held by provider] aspirin  81 mg Oral Daily    pantoprazole  40 mg IntraVENous QAM    sodium hypochlorite   Irrigation Daily    Riociguat  2.5 mg Oral Q8H    sodium chloride flush  5-40 mL IntraVENous 2 times per day    allopurinol  300 mg Oral Daily    [Held by provider] FLUoxetine  40 mg Oral Daily    [Held by provider] metoprolol tartrate  12.5 mg Oral BID      sodium chloride      phenylephrine (BENJAMIN-SYNEPHRINE) 50mg/250mL infusion 20 mcg/min (11/12/21 0731)    dextrose      dexmedetomidine 0.4 mcg/kg/hr (11/12/21 0800)    heparin 5000 units CRRT syringe 1 mL/hr at 11/11/21 0110    bumetanide 0.1 mg/mL infusion 0.5 mg/hr (11/05/21 0904)    prismaSATE BGK 4/2.5 250 mL/hr at 11/11/21 1353    prismaSol BGK 4/2.5 500 mL/hr at 11/11/21 1352    prismaSol BGK 4/2.5 500 mL/hr at 11/12/21 0105    sodium chloride Stopped (11/12/21 0200)   [facility] by provider] sodium chloride 50 mL/hr at 10/28/21 2314       Vitals :  BP (!) 117/47   Pulse 77   Temp 98.2 °F (36.8 °C) (Axillary)   Resp 25   Ht 4' 9\" (1.448 m)   Wt 179 lb 3.7 oz (81.3 kg)   SpO2 90%   BMI 38.79 kg/m²     24HR INTAKE/OUTPUT:      Intake/Output Summary (Last 24 hours) at 11/12/2021 0828  Last data filed at 11/12/2021 0800  Gross per 24 hour   Intake 709.88 ml   Output 2586 ml   Net -1876.12 ml     Last 3 weights  Wt Readings from Last 3 Encounters:   11/12/21 179 lb 3.7 oz (81.3 kg)   10/25/21 164 lb (74.4 kg)   09/29/21 160 lb (72.6 kg)           Physical Exam :  General Appearance:  Well developed. No distress  Mouth/Throat:  Oral mucosa moist.   Neck:  Supple, no JVD  Lungs:  Breath sounds: clear, diminished  Heart[de-identified]  S1,S2 heard  Abdomen:  Soft, non - tender  Musculoskeletal:  Edema - noted but appears better         Last 3 CBC   Recent Labs     11/11/21  1200 11/11/21  2020 11/12/21  0210   WBC 13.1* 14.0* 15.3*   RBC 2.62* 2.52* 2.54*   HGB 7.4* 7.1* 7.3*   HCT 24.3* 23.1* 23.3*   PLT 67* 70* 73*     Last 3 CMP  Recent Labs     11/11/21  1200 11/11/21  2020 11/12/21  0210    136 138   K 4.3 4.3 4.2    103 104   CO2 26 25 25   BUN 26* 26* 26*   CREATININE 0.8 0.9 0.8   CALCIUM 8.6 8.6 8.6   LABALBU 3.3* 3.0* 3.1*   BILITOT 1.1 1.1 1.1             ASSESSMENT / Plan   1. Renal -acute kidney injury, multifactorial etiology which includes hypotension from sepsis, IV contrast exposure on 10/29 and has some hydronephrosis as well . ? Patient's creatinine continued to get worse, but primarily fluid status worsened, did not respond to diuretics so started her on CVVH on 11/5/2021  ? Currently CVVH running well but received a call that the system has clotted. ? At this time will hold the CVVH for now and monitor urine output and renal function     2. Electrolytes -within normal limits  3. Mild acidosis improved with CVVH  4.  Acute hypercapnic respiratory failure currently on a BIPAP  5. Pneumonia with pleural effusion and septic shock  6. Hypotension on pressors wean to MAP greater than 65. Patient is able to take p.o. also will add midodrine 10 every 8 hours hopefully this can help wean the pressors down  7. Hydronephrosis status post nephrostomy tube placement  8. + ANCA, patient has been having some proteinuria as an outpatient and during my last visit have ordered all the serologic work-up , which the patient has done prior to getting admitted to the hospital however the ANCA was not done at that time. She may be having some renal pathology going on , but I doubt it has anything to do with acute renal dysfunction during this admission. However getting steroids. Once clinically stable might consider a renal biopsy. 9. Meds reviewed and discussed with RN    Dr. Rosendo Santo MD, M,D.  [facility].

## 2021-11-12 NOTE — PROGRESS NOTES
Patient extubated and confused. She is on low-dose Benjamin-Synephrine. On CVVH. Left nephrostomy tube flushing well. On CBI at 100 ml/hour. Vitals:    11/12/21 0545 11/12/21 0600 11/12/21 0607 11/12/21 0617   BP: (!) 115/50 (!) 109/51     Pulse: 80 83     Resp: 23 23 23 22   Temp:       TempSrc:       SpO2: 94% 94% 95% 96%   Weight:       Height:             Intake/Output Summary (Last 24 hours) at 11/12/2021 0630  Last data filed at 11/12/2021 5657  Gross per 24 hour   Intake 685.36 ml   Output 2653 ml   Net -1967.64 ml       Labs  Recent Labs     11/11/21  1200 11/11/21  2020 11/12/21  0210   WBC 13.1* 14.0* 15.3*   HGB 7.4* 7.1* 7.3*   HCT 24.3* 23.1* 23.3*   PLT 67* 70* 73*    136 138   K 4.3 4.3 4.2    103 104   CO2 26 25 25   BUN 26* 26* 26*   CREATININE 0.8 0.9 0.8   MG 2.2 2.3 2.4   PHOS 2.8 2.8 2.6   CALCIUM 8.6 8.6 8.6   Urine culture: No growth (11/2 and 10/27)  Left nephrostomy tube culture: Staph epidermis, MRSE. Very light growth. Exam  General: Extubated and confused. Lungs: Diminished bilaterally. No r/r/w. Heart: RRR. S1/S2. Abdomen: Soft. Distended. Left nephrostomy tube with bloody drainage. Hypoactive bowel sounds. : Bilateral labia with significant edema. Padilla with light yellow urine. Extremities: UE and LE with 2+ pitting edema bilaterally      Assessment and Plan    1. ALVIN- Multifactorial (sepsis, left hydronephrosis, contrast). Nephrology consulted. Started CVVH on 11/5/21.     2. Gross Hematuria- CBI running. Left nephrostomy tube unable to be flushed. Consulted IR to assess patency and then resume flushes. Will repeat CT abdomen and pelvis without contrast.      3. Pyelonephritis- Perinephric stranding on CT abdomen and pelvis with a small area concerning for developing abscess in the region of the left nephrostomy tube. Original and subsequent CT on 11/9/21 are stable.  Will continue to follow peripherally.      4. Left Staghorn Calculus- Nephrostomy tube place 11/2/21. Will need treated when patient stable.      5. Acute Blood Loss Anemia- Has received multiple transfusions. Heparin stopped. Unit pending today.     6. Respiratory Failure secondary to Pneumonia- Intubated. On Clindamycin. Completed Rocephin.     7. 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 8. Left Main Stem Clot- Status post bronchoscopy (11/11/21). Repeat bronchoscopy possible today.      Discussed with Dr. Og Contreras PA-C  11/12/21

## 2021-11-12 NOTE — PROGRESS NOTES
Comprehensive Nutrition Assessment    Type and Reason for Visit:  Reassess (TF check)    Nutrition Recommendations/Plan:   Recommend increase Nepro towards goal 30ml/ hour as tolerated  Free water flush as per Doctor  NPO per Speech Therapy/ Doctor  Recommend probiotic    Nutrition Assessment:    Pt. nutritionally compromised AEB NPO status due to failed swallow evaluation 11/10, tolerating tube feeding at 10ml/ hour.  At risk for further nutrition compromise r/t admitted with acute respiratory failure, intubated 10/27, extubated 11/9, AFib, ALVIN, CRRT this admit ,  stage 3 sacral wound, 11/1 nephrosotmy tube placement, anemia , concern for cholecystitis, TF intolerances this admit and underlying medical condition (CKD, s/p ID of buttock wound 8/6/21,CHF, gout, pulmonary HTN, obesity ).  Nutrition recommendations/interventions as per above. Malnutrition Assessment:  Malnutrition Status:   At risk for malnutrition (Comment)    Context:  Acute Illness     Findings of the 6 clinical characteristics of malnutrition:  Energy Intake:  1 - 75% or less of estimated energy requirements for 7 or more days  Weight Loss:   (5.8% weight loss in 3.5 months)     Body Fat Loss:  No significant body fat loss     Muscle Mass Loss:  Unable to assess    Fluid Accumulation:  1 - Mild     Strength:  Not Performed    Estimated Daily Nutrient Needs:  Energy (kcal):  ~1127 kcals (15); Weight Used for Energy Requirements:  Admission (75.1 kg)     Protein (g):  ~59 grams ( 1.4) monitor reanl status; Weight Used for Protein Requirements:  Ideal (42.3 kg)        Fluid (ml/day):  per Aliyah Terrazas Money Used for Fluid Requirements:  Other (Comment)      Nutrition Related Findings:    Patient intubated 10/27, extubated 11/9, failed swallow evaluation per Speech Therapy 11/10 and recommends to continue NPO, Tube feeding via NGT which started last night, on high flow nasal cannula, flexiseal with stool leaked around tube overnight, spoke with RN regarding tube feeding, CRRT clotted this AM and plans to hold for now; medication includes cleocin, precedex, humalog, addie-synephrine;  Potassium 4.1, BUN 27, Creatinine 0.9, Glucose 83, Phosphorus 2.3, Hgb 7.2    Wounds:  Stage III (on coccyx per RN)       Current Nutrition Therapies:    Diet NPO  ADULT TUBE FEEDING; Orogastric; Renal Formula; Continuous; 10; Yes; 10; Q 8 hours; 30; 30; Q 4 hours  Current Tube Feeding (TF) Orders:  · Feeding Route: Nasogastric (placement 11/11)  · Formula: Renal Formula (Nepro)  · Schedule: Continuous (goal 30 ml/hr)  · Additives/Modulars:  (.)  · Water Flushes: per Dr  · Goal TF & Flush Orders Provides: 0741 kcals, 58 grams protein, 115 grams CHO, 18 grams fiber, 523 ml free H20 in 720 ml TF/24 hours      Anthropometric Measures:  · Height: 4' 9\" (144.8 cm)  · Current Body Weight: 179 lb 3.7 oz (81.3 kg) (11/12; +2 pitting edema)   · Admission Body Weight: 165 lb 9.1 oz (75.1 kg) (10/27 +1 edema)    · Usual Body Weight: 175 lb 11.3 oz (79.7 kg) (per EMR 7/16/21)     · Ideal Body Weight: 85 lbs;   · BMI: 38.8  · Adjusted Body Weight:  ;  (IBW ~93 #)   · BMI Categories: Obese Class 2 (BMI 35.0 -39.9) (on admit)       Nutrition Diagnosis:   · Inadequate oral intake related to swallowing difficulty as evidenced by NPO or clear liquid status due to medical condition, nutrition support - enteral nutrition      Nutrition Interventions:   Food and/or Nutrient Delivery:  Continue Current Tube Feeding  Nutrition Education/Counseling:  Education not appropriate   Coordination of Nutrition Care:  Continue to monitor while inpatient, Interdisciplinary Rounds    Goals:  Tube feeding to provide 75% or more of estimated nutrient needs until able to transition to oral diet during LOS       Nutrition Monitoring and Evaluation:   Behavioral-Environmental Outcomes:  None Identified   Food/Nutrient Intake Outcomes:  Enteral Nutrition Intake/Tolerance  Physical Signs/Symptoms Outcomes:  Biochemical Data, GI Status, Nausea or Vomiting, Fluid Status or Edema, Hemodynamic Status, Nutrition Focused Physical Findings, Skin, Weight     Discharge Planning:     Too soon to determine     Electronically signed by Yomi Quinn RD, LD on 11/12/21 at 1:18 PM EST    Contact: [phone]

## 2021-11-13 ENCOUNTER — APPOINTMENT (OUTPATIENT)
Dept: GENERAL RADIOLOGY | Age: 71
DRG: 004 | End: 2021-11-13
Payer: MEDICARE

## 2021-11-13 LAB
ACINETOBACTER CALCOACETICUS-BAUMANNII BY PCR: NOT DETECTED
ADENOVIRUS BY PCR: NOT DETECTED
ALBUMIN SERPL-MCNC: 2.8 G/DL (ref 3.5–5.1)
ALBUMIN SERPL-MCNC: 3.1 G/DL (ref 3.5–5.1)
ALLEN TEST: POSITIVE
ALP BLD-CCNC: 78 U/L (ref 38–126)
ALP BLD-CCNC: 83 U/L (ref 38–126)
ALT SERPL-CCNC: 11 U/L (ref 11–66)
ALT SERPL-CCNC: 12 U/L (ref 11–66)
ANION GAP SERPL CALCULATED.3IONS-SCNC: 7 MEQ/L (ref 8–16)
ANION GAP SERPL CALCULATED.3IONS-SCNC: 8 MEQ/L (ref 8–16)
AST SERPL-CCNC: 10 U/L (ref 5–40)
AST SERPL-CCNC: 9 U/L (ref 5–40)
BASE EXCESS (CALCULATED): 0.3 MMOL/L (ref -2.5–2.5)
BILIRUB SERPL-MCNC: 0.6 MG/DL (ref 0.3–1.2)
BILIRUB SERPL-MCNC: 0.7 MG/DL (ref 0.3–1.2)
BUN BLDV-MCNC: 42 MG/DL (ref 7–22)
BUN BLDV-MCNC: 50 MG/DL (ref 7–22)
CALCIUM IONIZED: 1.33 MMOL/L (ref 1.12–1.32)
CALCIUM IONIZED: 1.34 MMOL/L (ref 1.12–1.32)
CALCIUM SERPL-MCNC: 8.3 MG/DL (ref 8.5–10.5)
CALCIUM SERPL-MCNC: 8.5 MG/DL (ref 8.5–10.5)
CHLAMYDIA PNEUMONIAE BY PCR: NOT DETECTED
CHLORIDE BLD-SCNC: 104 MEQ/L (ref 98–111)
CHLORIDE BLD-SCNC: 105 MEQ/L (ref 98–111)
CO2: 25 MEQ/L (ref 23–33)
CO2: 25 MEQ/L (ref 23–33)
COLLECTED BY:: ABNORMAL
CREAT SERPL-MCNC: 1.5 MG/DL (ref 0.4–1.2)
CREAT SERPL-MCNC: 1.7 MG/DL (ref 0.4–1.2)
DEVICE: ABNORMAL
ENTEROBACTER CLOACAE COMPLEX BY PCR: NOT DETECTED
ERYTHROCYTE [DISTWIDTH] IN BLOOD BY AUTOMATED COUNT: 18.9 % (ref 11.5–14.5)
ERYTHROCYTE [DISTWIDTH] IN BLOOD BY AUTOMATED COUNT: 18.9 % (ref 11.5–14.5)
ERYTHROCYTE [DISTWIDTH] IN BLOOD BY AUTOMATED COUNT: 62.9 FL (ref 35–45)
ERYTHROCYTE [DISTWIDTH] IN BLOOD BY AUTOMATED COUNT: 63.5 FL (ref 35–45)
ESCHERICHIA COLI BY PCR: NOT DETECTED
GFR SERPL CREATININE-BSD FRML MDRD: 30 ML/MIN/1.73M2
GFR SERPL CREATININE-BSD FRML MDRD: 34 ML/MIN/1.73M2
GLUCOSE BLD-MCNC: 100 MG/DL (ref 70–108)
GLUCOSE BLD-MCNC: 108 MG/DL (ref 70–108)
GLUCOSE BLD-MCNC: 111 MG/DL (ref 70–108)
GLUCOSE BLD-MCNC: 119 MG/DL (ref 70–108)
GLUCOSE BLD-MCNC: 120 MG/DL (ref 70–108)
GLUCOSE BLD-MCNC: 121 MG/DL (ref 70–108)
GLUCOSE BLD-MCNC: 121 MG/DL (ref 70–108)
GLUCOSE BLD-MCNC: 142 MG/DL (ref 70–108)
GRAM STAIN RESULT: NORMAL
HAEMOPHILUS INFLUENZAE BY PCR: NOT DETECTED
HCO3: 26 MMOL/L (ref 23–28)
HCT VFR BLD CALC: 27.1 % (ref 37–47)
HCT VFR BLD CALC: 28.2 % (ref 37–47)
HEMOGLOBIN: 8.2 GM/DL (ref 12–16)
HEMOGLOBIN: 8.7 GM/DL (ref 12–16)
IFIO2: 70
INFLUENZA A BY PCR: NOT DETECTED
INFLUENZA B BY PCR: NOT DETECTED
KLEBSIELLA AEROGENES BY PCR: NOT DETECTED
KLEBSIELLA OXYTOCA BY PCR: NOT DETECTED
KLEBSIELLA PNEUMONIAE GROUP BY PCR: NOT DETECTED
LEGIONELLA PNEUMOPHILIA BY PCR: NOT DETECTED
MAGNESIUM: 2 MG/DL (ref 1.6–2.4)
MAGNESIUM: 2 MG/DL (ref 1.6–2.4)
MCH RBC QN AUTO: 28.3 PG (ref 26–33)
MCH RBC QN AUTO: 28.6 PG (ref 26–33)
MCHC RBC AUTO-ENTMCNC: 30.3 GM/DL (ref 32.2–35.5)
MCHC RBC AUTO-ENTMCNC: 30.9 GM/DL (ref 32.2–35.5)
MCV RBC AUTO: 92.8 FL (ref 81–99)
MCV RBC AUTO: 93.4 FL (ref 81–99)
METAPNEUMOVIRUS BY PCR: NOT DETECTED
MORAXELLA CATARRHALIS BY PCR: NOT DETECTED
MYCOPLASMA PNEUMONIAE BY PCR: NOT DETECTED
NON-SARS CORONAVIRUS: NOT DETECTED
O2 SATURATION: 89 %
PARAINFLUENZA VIRUS BY PCR: NOT DETECTED
PCO2: 45 MMHG (ref 35–45)
PH BLOOD GAS: 7.36 (ref 7.35–7.45)
PHOSPHORUS: 3.4 MG/DL (ref 2.4–4.7)
PHOSPHORUS: 4 MG/DL (ref 2.4–4.7)
PLATELET # BLD: 61 THOU/MM3 (ref 130–400)
PLATELET # BLD: 76 THOU/MM3 (ref 130–400)
PMV BLD AUTO: 9.5 FL (ref 9.4–12.4)
PMV BLD AUTO: 9.7 FL (ref 9.4–12.4)
PO2: 59 MMHG (ref 71–104)
POTASSIUM REFLEX MAGNESIUM: 4.1 MEQ/L (ref 3.5–5.2)
POTASSIUM REFLEX MAGNESIUM: 4.2 MEQ/L (ref 3.5–5.2)
POTASSIUM SERPL-SCNC: 4.1 MEQ/L (ref 3.5–5.2)
PROTEUS SPECIES BY PCR: NOT DETECTED
PSEUDOMONAS AERUGINOSA BY PCR: NOT DETECTED
RBC # BLD: 2.9 MILL/MM3 (ref 4.2–5.4)
RBC # BLD: 3.04 MILL/MM3 (ref 4.2–5.4)
RESISTANT GENE CTX-M BY PCR: ABNORMAL
RESISTANT GENE IMP BY PCR: ABNORMAL
RESISTANT GENE KPC BY PCR: ABNORMAL
RESISTANT GENE MECA/C & MREJ BY PCR: DETECTED
RESISTANT GENE NDM BY PCR: ABNORMAL
RESISTANT GENE OXA-48-LIKE BY PCR: ABNORMAL
RESISTANT GENE VIM BY PCR: ABNORMAL
RESPIRATORY CULTURE: NORMAL
RESPIRATORY SYNCYTIAL VIRUS BY PCR: NOT DETECTED
RHINOVIRUS ENTEROVIRUS PCR: NOT DETECTED
SERRATIA MARCESCENS BY PCR: NOT DETECTED
SODIUM BLD-SCNC: 137 MEQ/L (ref 135–145)
SODIUM BLD-SCNC: 137 MEQ/L (ref 135–145)
SOURCE, BLOOD GAS: ABNORMAL
SOURCE: ABNORMAL
SPECIMEN ACCEPTABILITY: ABNORMAL
STAPH AUREUS BY PCR: DETECTED
STREP AGALACTIAE BY PCR: NOT DETECTED
STREP PNEUMONIAE BY PCR: NOT DETECTED
STREP PYOGENES BY PCR: NOT DETECTED
TOTAL PROTEIN: 4.5 G/DL (ref 6.1–8)
TOTAL PROTEIN: 4.8 G/DL (ref 6.1–8)
WBC # BLD: 15.8 THOU/MM3 (ref 4.8–10.8)
WBC # BLD: 20 THOU/MM3 (ref 4.8–10.8)

## 2021-11-13 PROCEDURE — 87150 DNA/RNA AMPLIFIED PROBE: CPT

## 2021-11-13 PROCEDURE — 99232 SBSQ HOSP IP/OBS MODERATE 35: CPT | Performed by: INTERNAL MEDICINE

## 2021-11-13 PROCEDURE — 51700 IRRIGATION OF BLADDER: CPT

## 2021-11-13 PROCEDURE — 2500000003 HC RX 250 WO HCPCS

## 2021-11-13 PROCEDURE — 94761 N-INVAS EAR/PLS OXIMETRY MLT: CPT

## 2021-11-13 PROCEDURE — 6360000002 HC RX W HCPCS: Performed by: NURSE PRACTITIONER

## 2021-11-13 PROCEDURE — 83735 ASSAY OF MAGNESIUM: CPT

## 2021-11-13 PROCEDURE — 94669 MECHANICAL CHEST WALL OSCILL: CPT

## 2021-11-13 PROCEDURE — 71045 X-RAY EXAM CHEST 1 VIEW: CPT

## 2021-11-13 PROCEDURE — 6360000002 HC RX W HCPCS: Performed by: STUDENT IN AN ORGANIZED HEALTH CARE EDUCATION/TRAINING PROGRAM

## 2021-11-13 PROCEDURE — 87486 CHLMYD PNEUM DNA AMP PROBE: CPT

## 2021-11-13 PROCEDURE — 6370000000 HC RX 637 (ALT 250 FOR IP): Performed by: INTERNAL MEDICINE

## 2021-11-13 PROCEDURE — 89220 SPUTUM SPECIMEN COLLECTION: CPT

## 2021-11-13 PROCEDURE — 85027 COMPLETE CBC AUTOMATED: CPT

## 2021-11-13 PROCEDURE — 36600 WITHDRAWAL OF ARTERIAL BLOOD: CPT

## 2021-11-13 PROCEDURE — 87070 CULTURE OTHR SPECIMN AEROBIC: CPT

## 2021-11-13 PROCEDURE — 2580000003 HC RX 258: Performed by: INTERNAL MEDICINE

## 2021-11-13 PROCEDURE — 99291 CRITICAL CARE FIRST HOUR: CPT | Performed by: INTERNAL MEDICINE

## 2021-11-13 PROCEDURE — 2000000000 HC ICU R&B

## 2021-11-13 PROCEDURE — 94660 CPAP INITIATION&MGMT: CPT

## 2021-11-13 PROCEDURE — 82803 BLOOD GASES ANY COMBINATION: CPT

## 2021-11-13 PROCEDURE — 84100 ASSAY OF PHOSPHORUS: CPT

## 2021-11-13 PROCEDURE — 90945 DIALYSIS ONE EVALUATION: CPT

## 2021-11-13 PROCEDURE — 87631 RESP VIRUS 3-5 TARGETS: CPT

## 2021-11-13 PROCEDURE — 6360000002 HC RX W HCPCS: Performed by: FAMILY MEDICINE

## 2021-11-13 PROCEDURE — 87205 SMEAR GRAM STAIN: CPT

## 2021-11-13 PROCEDURE — 82948 REAGENT STRIP/BLOOD GLUCOSE: CPT

## 2021-11-13 PROCEDURE — 82330 ASSAY OF CALCIUM: CPT

## 2021-11-13 PROCEDURE — 36415 COLL VENOUS BLD VENIPUNCTURE: CPT

## 2021-11-13 PROCEDURE — 3609027000 HC BRONCHOSCOPY

## 2021-11-13 PROCEDURE — 0B988ZZ DRAINAGE OF LEFT UPPER LOBE BRONCHUS, VIA NATURAL OR ARTIFICIAL OPENING ENDOSCOPIC: ICD-10-PCS | Performed by: INTERNAL MEDICINE

## 2021-11-13 PROCEDURE — 80053 COMPREHEN METABOLIC PANEL: CPT

## 2021-11-13 PROCEDURE — 2580000003 HC RX 258: Performed by: STUDENT IN AN ORGANIZED HEALTH CARE EDUCATION/TRAINING PROGRAM

## 2021-11-13 PROCEDURE — 87541 LEGION PNEUMO DNA AMP PROB: CPT

## 2021-11-13 PROCEDURE — 2720000010 HC SURG SUPPLY STERILE

## 2021-11-13 PROCEDURE — 2500000003 HC RX 250 WO HCPCS: Performed by: STUDENT IN AN ORGANIZED HEALTH CARE EDUCATION/TRAINING PROGRAM

## 2021-11-13 PROCEDURE — 31646 BRNCHSC W/THER ASPIR SBSQ: CPT | Performed by: INTERNAL MEDICINE

## 2021-11-13 PROCEDURE — 87581 M.PNEUMON DNA AMP PROBE: CPT

## 2021-11-13 PROCEDURE — 2500000003 HC RX 250 WO HCPCS: Performed by: INTERNAL MEDICINE

## 2021-11-13 PROCEDURE — 2580000003 HC RX 258: Performed by: EMERGENCY MEDICINE

## 2021-11-13 PROCEDURE — 94640 AIRWAY INHALATION TREATMENT: CPT

## 2021-11-13 PROCEDURE — 87798 DETECT AGENT NOS DNA AMP: CPT

## 2021-11-13 PROCEDURE — 2700000000 HC OXYGEN THERAPY PER DAY

## 2021-11-13 PROCEDURE — C9113 INJ PANTOPRAZOLE SODIUM, VIA: HCPCS | Performed by: NURSE PRACTITIONER

## 2021-11-13 PROCEDURE — 87147 CULTURE TYPE IMMUNOLOGIC: CPT

## 2021-11-13 RX ORDER — BUMETANIDE 0.25 MG/ML
1 INJECTION, SOLUTION INTRAMUSCULAR; INTRAVENOUS 2 TIMES DAILY
Status: DISPENSED | OUTPATIENT
Start: 2021-11-13 | End: 2021-11-14

## 2021-11-13 RX ORDER — LIDOCAINE HYDROCHLORIDE 10 MG/ML
5 INJECTION, SOLUTION INFILTRATION; PERINEURAL ONCE
Status: COMPLETED | OUTPATIENT
Start: 2021-11-13 | End: 2021-11-13

## 2021-11-13 RX ORDER — ALBUTEROL SULFATE 2.5 MG/3ML
2.5 SOLUTION RESPIRATORY (INHALATION) EVERY 4 HOURS PRN
Status: DISCONTINUED | OUTPATIENT
Start: 2021-11-13 | End: 2021-12-01 | Stop reason: HOSPADM

## 2021-11-13 RX ORDER — ACETYLCYSTEINE 200 MG/ML
600 SOLUTION ORAL; RESPIRATORY (INHALATION) ONCE
Status: COMPLETED | OUTPATIENT
Start: 2021-11-13 | End: 2021-11-13

## 2021-11-13 RX ORDER — LIDOCAINE HYDROCHLORIDE 10 MG/ML
INJECTION, SOLUTION EPIDURAL; INFILTRATION; INTRACAUDAL; PERINEURAL
Status: COMPLETED
Start: 2021-11-13 | End: 2021-11-13

## 2021-11-13 RX ORDER — KETAMINE HCL IN NACL, ISO-OSM 100MG/10ML
50 SYRINGE (ML) INJECTION ONCE
Status: COMPLETED | OUTPATIENT
Start: 2021-11-13 | End: 2021-11-13

## 2021-11-13 RX ORDER — MIDAZOLAM HYDROCHLORIDE 1 MG/ML
2 INJECTION INTRAMUSCULAR; INTRAVENOUS ONCE
Status: DISCONTINUED | OUTPATIENT
Start: 2021-11-13 | End: 2021-11-15

## 2021-11-13 RX ORDER — ALLOPURINOL 100 MG/1
100 TABLET ORAL DAILY
Status: DISCONTINUED | OUTPATIENT
Start: 2021-11-13 | End: 2021-12-01 | Stop reason: HOSPADM

## 2021-11-13 RX ADMIN — CLINDAMYCIN PHOSPHATE 600 MG: 600 INJECTION, SOLUTION INTRAVENOUS at 23:42

## 2021-11-13 RX ADMIN — MIDODRINE HYDROCHLORIDE 10 MG: 10 TABLET ORAL at 20:15

## 2021-11-13 RX ADMIN — LIDOCAINE HYDROCHLORIDE 5 ML: 10 INJECTION, SOLUTION EPIDURAL; INFILTRATION; INTRACAUDAL; PERINEURAL at 08:50

## 2021-11-13 RX ADMIN — Medication: at 22:45

## 2021-11-13 RX ADMIN — CLINDAMYCIN PHOSPHATE 600 MG: 600 INJECTION, SOLUTION INTRAVENOUS at 14:15

## 2021-11-13 RX ADMIN — BUMETANIDE 1 MG: 0.25 INJECTION INTRAMUSCULAR; INTRAVENOUS at 10:09

## 2021-11-13 RX ADMIN — SODIUM CHLORIDE 0.5 MCG/KG/HR: 9 INJECTION, SOLUTION INTRAVENOUS at 05:44

## 2021-11-13 RX ADMIN — PANTOPRAZOLE SODIUM 40 MG: 40 INJECTION, POWDER, FOR SOLUTION INTRAVENOUS at 09:41

## 2021-11-13 RX ADMIN — INSULIN LISPRO 1 UNITS: 100 INJECTION, SOLUTION INTRAVENOUS; SUBCUTANEOUS at 15:26

## 2021-11-13 RX ADMIN — ALBUTEROL SULFATE 2.5 MG: 2.5 SOLUTION RESPIRATORY (INHALATION) at 04:26

## 2021-11-13 RX ADMIN — ALLOPURINOL 100 MG: 100 TABLET ORAL at 11:38

## 2021-11-13 RX ADMIN — SODIUM CHLORIDE, PRESERVATIVE FREE 10 ML: 5 INJECTION INTRAVENOUS at 09:42

## 2021-11-13 RX ADMIN — Medication 50 MG: at 08:19

## 2021-11-13 RX ADMIN — SODIUM CHLORIDE 25 ML: 9 INJECTION, SOLUTION INTRAVENOUS at 14:14

## 2021-11-13 RX ADMIN — SODIUM CHLORIDE SOLN NEBU 3% 4 ML: 3 NEBU SOLN at 13:01

## 2021-11-13 RX ADMIN — SODIUM HYPOCHLORITE: 1.25 SOLUTION TOPICAL at 09:42

## 2021-11-13 RX ADMIN — FENTANYL CITRATE 25 MCG: 0.05 INJECTION, SOLUTION INTRAMUSCULAR; INTRAVENOUS at 15:23

## 2021-11-13 RX ADMIN — ACETYLCYSTEINE 600 MG: 200 SOLUTION ORAL; RESPIRATORY (INHALATION) at 04:26

## 2021-11-13 RX ADMIN — SODIUM CHLORIDE 0.6 MCG/KG/HR: 9 INJECTION, SOLUTION INTRAVENOUS at 14:12

## 2021-11-13 RX ADMIN — MIDODRINE HYDROCHLORIDE 10 MG: 10 TABLET ORAL at 11:55

## 2021-11-13 RX ADMIN — SODIUM CHLORIDE, PRESERVATIVE FREE 10 ML: 5 INJECTION INTRAVENOUS at 21:13

## 2021-11-13 RX ADMIN — ALBUTEROL SULFATE 2.5 MG: 2.5 SOLUTION RESPIRATORY (INHALATION) at 00:19

## 2021-11-13 RX ADMIN — CLINDAMYCIN PHOSPHATE 600 MG: 600 INJECTION, SOLUTION INTRAVENOUS at 07:50

## 2021-11-13 RX ADMIN — SODIUM CHLORIDE 0.7 MCG/KG/HR: 9 INJECTION, SOLUTION INTRAVENOUS at 21:09

## 2021-11-13 RX ADMIN — SODIUM CHLORIDE SOLN NEBU 3% 4 ML: 3 NEBU SOLN at 17:55

## 2021-11-13 RX ADMIN — ALBUTEROL SULFATE 2.5 MG: 2.5 SOLUTION RESPIRATORY (INHALATION) at 17:54

## 2021-11-13 RX ADMIN — SODIUM CHLORIDE SOLN NEBU 3% 4 ML: 3 NEBU SOLN at 00:19

## 2021-11-13 RX ADMIN — ALBUTEROL SULFATE 2.5 MG: 2.5 SOLUTION RESPIRATORY (INHALATION) at 13:01

## 2021-11-13 RX ADMIN — LIDOCAINE HYDROCHLORIDE 5 ML: 10 INJECTION, SOLUTION INFILTRATION; PERINEURAL at 08:50

## 2021-11-13 RX ADMIN — MIDODRINE HYDROCHLORIDE 10 MG: 10 TABLET ORAL at 04:38

## 2021-11-13 ASSESSMENT — PAIN SCALES - GENERAL
PAINLEVEL_OUTOF10: 0

## 2021-11-13 NOTE — PROCEDURES
Bronchoscopy Outpatient Procedure Note    Date of Procedure: 11/13/2021    Pre-op Diagnosis: Left-sided mucous plug. Post-op Diagnosis: Left main stem mucous plug. Bronchoscopist[de-identified] Charlee Sena MD      Anesthesia: Conscious Sedation. Total Dose: Ketamine 50 mcg. Procedure: Therapeutic flexible fiberoptic bronchoscopy with removal of mucous plug.     Estimated Blood Loss: Minimal    Complications: No immediate complications         ASA Grade: ASA 3 - Patient with moderate systemic disease with functional limitations    PMH:  Past Medical History:   Diagnosis Date    CHF [facility]congestive heart failure) ([facility])     Dr. Vivien Mcclelland Depression     Gout attack     Hypertension     Osteoarthritis     Pulmonary artery hypertension ([facility] [address]     [facility] Dr. Erlin Subramanian-- Dr. Vivien Mcclelland Renal calculi      [facility])      SURGICAL HISTORY:  Past Surgical History:   Procedure Laterality Date    APPENDECTOMY  1960    FACIAL NERVE SURGERY      1989    IR NEPHROSTOMY PERCUTANEOUS LEFT  11/1/2021    IR NEPHROSTOMY PERCUTANEOUS LEFT 11/1/2021 Leo Schwab, MD [facility] SPECIAL PROCEDURES    PRESSURE ULCER DEBRIDEMENT Right 8/6/2021    EXCISION RIGHT BUTTOCK WOUND AND CLOSURE performed by Iman Amezquita MD at [address] [place] [address] Road:  Social History     Tobacco Use    Smoking status: Never Smoker    Smokeless tobacco: Never Used   Vaping Use    Vaping Use: Never used   Substance Use Topics    Alcohol use: No    Drug use: No     ALLERGIES:No Known Allergies  FAMILY HISTORY:  Family History   Problem Relation Age of Onset   Mercedes Diabetes Father     Arthritis Mother     COPD Mother     Diabetes Sister     Heart Disease Maternal Uncle     Breast Cancer Niece 36    Sleep Apnea Brother     Asthma Neg Hx     Birth Defects Neg Hx     Cancer Neg Hx     Depression Neg Hx     Early Death Neg Hx     Hearing Loss Neg Hx     High Blood Pressure Neg Hx     High Cholesterol Neg Hx     Kidney Disease Neg Hx  Learning Disabilities Neg Hx     Mental Illness Neg Hx     Mental Retardation Neg Hx     Miscarriages / Stillbirths Neg Hx     Stroke Neg Hx     Substance Abuse Neg Hx     Vision Loss Neg Hx     Other Neg Hx      CURRENT MEDICATIONS:  Current Facility-Administered Medications   Medication Dose Route Frequency Provider Last Rate Last Admin    albuterol (PROVENTIL) nebulizer solution 2.5 mg  2.5 mg Nebulization Q4H PRN Cindy Trejo MD   2.5 mg at 11/13/21 0426    ketamine (KETALAR) injection 50 mg  50 mg IntraVENous Once Jeffery Rachel MD        midazolam (VERSED) injection 2 mg  2 mg IntraVENous Once Jeffery Rachel MD        lidocaine PF 1 % injection             midodrine (PROAMATINE) tablet 10 mg  10 mg Oral Q8H Zeynep Wilcox MD   10 mg at 11/13/21 0438    clindamycin (CLEOCIN) 600 mg in dextrose 5 % 50 mL IVPB  600 mg IntraVENous Q8H Rad Anand  mL/hr at 11/13/21 0750 600 mg at 11/13/21 0750    ketamine (KETALAR) injection 50 mg  50 mg IntraVENous Once Kavya Austin MD        midazolam (VERSED) injection 2 mg  2 mg IntraVENous Once Kavya Austin MD        sodium chloride (Inhalant) 3 % nebulizer solution 4 mL  4 mL Nebulization 4 times per day Donne Kehr, DO   4 mL at 11/13/21 0019    And    albuterol (PROVENTIL) nebulizer solution 2.5 mg  2.5 mg Nebulization 4 times per day Donne Kehr, DO   2.5 mg at 11/13/21 0019    insulin lispro (HUMALOG) injection vial 0-6 Units  0-6 Units SubCUTAneous Q4H LANE Romeo - CNP   1 Units at 11/10/21 1948    phenylephrine (KRISTAL-SYNEPHRINE) 50 mg in dextrose 5 % 250 mL infusion   mcg/min IntraVENous Continuous Kavya Austin MD   Stopped at 11/12/21 1552    phosphorus replacement protocol   Other RX Placeholder Adrian Anand DO        fentaNYL (SUBLIMAZE) injection 25 mcg  25 mcg IntraVENous Q1H PRN Radha Anand DO   25 mcg at 11/09/21 0836    potassium bicarb-citric acid (EFFER-K) effervescent tablet 40 mEq  40 mEq Oral Once [employer], DO        glucose (GLUTOSE) 40 % oral gel 15 g  15 g Oral PRN Karely Anand, DO        dextrose 50 % IV solution  12.5 g IntraVENous PRN [employer], DO        glucagon (rDNA) injection 1 mg  1 mg IntraMUSCular PRN Karely Anand, DO        dextrose 5 % solution  100 mL/hr IntraVENous PRN [employer], DO        dexmedetomidine (PRECEDEX) 400 mcg in sodium chloride 0.9 % 100 mL infusion  0.2-1.4 mcg/kg/hr IntraVENous Continuous Rad Anand, DO 11.5 mL/hr at 11/13/21 0750 0.5 mcg/kg/hr at 11/13/21 0750    bumetanide (BUMEX) 12.5 mg in sodium chloride 0.9 % 125 mL infusion  0.5 mg/hr IntraVENous Continuous Karely Anand, DO 5 mL/hr at 11/05/21 0904 0.5 mg/hr at 11/05/21 0904    potassium chloride 20 mEq/50 mL IVPB (Central Line)  20 mEq IntraVENous PRN Teresa Ray MD        magnesium sulfate 1000 mg in dextrose 5% 100 mL IVPB  1,000 mg IntraVENous PRN Teresa Ray MD        sodium phosphate 6 mmol in dextrose 5 % 250 mL IVPB  6 mmol IntraVENous PRN Teresa Ray MD        Or    sodium phosphate 12 mmol in dextrose 5 % 250 mL IVPB  12 mmol IntraVENous PRN Teresa Ray MD        Or    sodium phosphate 18 mmol in dextrose 5 % 500 mL IVPB  18 mmol IntraVENous PRN Teresa Ray MD        Or    sodium phosphate 24 mmol in dextrose 5 % 500 mL IVPB  24 mmol IntraVENous PRN Teresa Ray MD        tiotropium-olodaterol (STIOLTO) 2.5-2.5 MCG/ACT inhaler 2 puff  2 puff Inhalation Daily Rad Anand, DO   2 puff at 11/12/21 0617    polyethylene glycol (GLYCOLAX) packet 17 g  17 g Oral Daily PRN Karely Anand, DO   17 g at 11/07/21 0824    [Held by provider] aspirin chewable tablet 81 mg  81 mg Oral Daily Cindy Trejo MD   81 mg at 11/07/21 0824    senna (SENOKOT) tablet 8.6 mg  1 tablet Oral BID PRN Karely Anand DO   8.6 mg at 11/09/21 0823    pantoprazole (PROTONIX) injection 40 mg 40 mg IntraVENous QAM Saul Preston, APRN - CNP   40 mg at 11/12/21 1007    acetaminophen (TYLENOL) tablet 650 mg  650 mg Oral Q4H PRN Marylee Lower, MD        sodium hypochlorite (DAKINS) 0.125 % external solution   Irrigation Daily Paloma Pavan V, DO   Given at 11/12/21 0821    0.9 % sodium chloride infusion  25 mL IntraVENous PRN Garrel Bee V, DO   Stopped at 11/12/21 0200    Riociguat TABS 2.5 mg  2.5 mg Oral Q8H Paloma Pavan V, DO   2.5 mg at 11/13/21 0545    sodium chloride flush 0.9 % injection 5-40 mL  5-40 mL IntraVENous 2 times per day Metzger Cutting, DO   10 mL at 11/12/21 2035    sodium chloride flush 0.9 % injection 5-40 mL  5-40 mL IntraVENous PRN Metzger Cutting, DO        allopurinol (ZYLOPRIM) tablet 300 mg  300 mg Oral Daily Cindy Trejo MD   300 mg at 11/12/21 1007    [Held by provider] FLUoxetine (PROZAC) capsule 40 mg  40 mg Oral Daily Cindy Trejo MD        [Held by provider] metoprolol tartrate (LOPRESSOR) tablet 12.5 mg  12.5 mg Oral BID Cindy Trejo MD        [Held by provider] 0.9 % sodium chloride infusion   IntraVENous Continuous Cindy Trejo MD 50 mL/hr at 10/28/21 2314 New Bag at 10/28/21 2314       Labs    CBC     Lab Results   Component Value Date    WBC 16.0 11/12/2021    RBC 3.03 11/12/2021    RBC 4.15 01/09/2018    HGB 8.7 11/12/2021    HCT 27.6 11/12/2021    PLT 69 11/12/2021    MCV 91.1 11/12/2021    MCH 28.7 11/12/2021    MCHC 31.5 11/12/2021    RDW 12.9 01/09/2018    NRBC 0 11/08/2021    SEGSPCT 89.1 11/08/2021    LYMPHOPCT 16.5 01/09/2018    MONOPCT 5.2 11/08/2021    EOSPCT 1.6 01/09/2018    BASOPCT 0.7 01/09/2018    MONOSABS 0.5 11/08/2021    LYMPHSABS 0.4 11/08/2021    EOSABS 0.1 11/08/2021    BASOSABS 0.0 11/08/2021    DIFFTYPE see below 11/05/2021       BMP   Lab Results   Component Value Date     11/12/2021    K 4.0 11/12/2021     11/12/2021    CO2 24 11/12/2021    BUN 35 11/12/2021    CREATININE 1.2 11/12/2021    GLUCOSE 102 11/12/2021    GLUCOSE 103 01/09/2018    CALCIUM 8.4 11/12/2021    MG 2.3 11/12/2021       INR  No results for input(s): INR in the last 72 hours. PTT   Lab Results   Component Value Date    APTT 88.9 (H) 11/04/2021       Physical Exam    BP (!) 107/47   Pulse 79   Temp 98.2 °F (36.8 °C) (Oral)   Resp 25   Ht 4' 9\" (1.448 m)   Wt 180 lb 8.9 oz (81.9 kg)   SpO2 100%   BMI 39.07 kg/m²   See note    Consent to Procedure  The risks, benefits, complications, treatment options and expected outcomes were discussed with the patient. The possibilities of reaction to medication, pulmonary aspiration, perforation of a viscus, bleeding, failure to diagnose a condition and creating a complication requiring transfusion or operation were discussed with the patient who freely signed the consent. Description of Procedure  The patient was brought to the endoscopy suite, identified as Hermilo Zheng and the procedure verified as Flexible Fiberoptic Bronchoscopy. A Time Out was held and the above information confirmed. The patient was monitored with non-invasive blood pressure monitoring, pulse oximetry, and continuoius ECG. After induction of topical nasopharyngeal anesthesia using viscous xylocaine solution and Afrin nasal spray, the patient was placed in appropriate position. When the patient was properly sedated using conscious sedation with fractionated doses of the medicications indicated above, the bronchoscope was passed through the left nasal passage after lubrication with cetacaine spray . The larynx and glottis were visualized and anesthetized using 1% Lidocaine solution topically placed onto the vocal cords. The bronchoscope was then passed into the trachea. Lidocaine 1% solution 2 ml at a time was applied topically to the avelina and bronchopulmonary segments .  After careful inspection of the tracheal, the bronchoscope was sequentially passed into all segments of the left and right endobronchial trees to the second and/or third divisions. Endobronchial findings  Vallecula - Normal   Arytenoids- normal   Vocal Cords true and false  Normal  Trachea  Normal mucosa  Tanja  Normal mucosa    Right Main Stem Bronchus  Normal mucosa  Right Upper Lobe Bronchi Normal mucosa  Right Middle Lobe Bronchi  Normal mucosa  Right Lower Lobe Bronchi (including the Superior segment)  Normal mucosa    Left Main Stem Bronchus Edematous mucosa, there was a large mucous plug obstructing the left main stem  Left Upper Lobe Bronchus, Upper Division Edematous mucosa  Left Upper Lobe Bronchus, Lingula  Edematous mucosa  Left Lower Lobe Bronchus (including the Superior segment)  Edematous mucosa    The patient was taken to the Endoscopy Recovery area in satisfactory condition. A portable CXR was ordered.     Specimens Taken      Washings -    Location -left bronchial washing      Yandel Salinas MD

## 2021-11-13 NOTE — PROGRESS NOTES
[facility] [address]  PHYSICAL THERAPY MISSED TREATMENT NOTE  [facility] ICU 4D    Date: 2021  Patient Name: Svetlana Brian        MRN: [id number]   : 1950  (77 y.o.)  Gender: female                REASON FOR MISSED TREATMENT:  Per communication with OT, Pt is very weak and would not likely tolerate another session today. Will wait and try tomorrow or next day. Consider co-treat with OT due to medical complexity and low endurance. Yari Loza.  Marlo Morgan [place] [address]

## 2021-11-13 NOTE — PROGRESS NOTES
Dr. Mckenzie Stephens and respiratory at bedside preparing for bronch-verbal order for 1% Lidocaine to be available for procedure  0819: 50 mg ketamine given by Dr. Pallavi Hopson: bronch washings obtained  0827: procedure complete

## 2021-11-13 NOTE — PROGRESS NOTES
Renal Progress Note  [facility]    Patient :  Marlo Mcfarlane; 73 y.o. MRN# [id number]  Location:  4D-09/009-A  Attending:  Lenard Gipson MD  Admit Date:  10/27/2021   Hospital Day: 17      Subjective:     Nephrology is following the patient for ALVIN. Patient seen and examined. S/p bronch this morning due to mucous plug. Currently on high flow. Has CBI running. Reviewed I/os with RN and urine output is very low. Outpatient Medications:     Medications Prior to Admission: metoprolol tartrate (LOPRESSOR) 25 MG tablet, Take 0.5 tablets by mouth 2 times daily  sodium hypochlorite (DAKINS) 0.125 % SOLN external solution, Apply Dakin's moistened gauze dressings to wound twice daily and as needed.   sodium chloride 1 g tablet, Take 1 tablet by mouth 2 times daily  allopurinol (ZYLOPRIM) 300 MG tablet, Take 1 tablet by mouth daily  FLUoxetine (PROZAC) 40 MG capsule, Take 1 capsule by mouth daily  potassium chloride (KLOR-CON M) 10 MEQ extended release tablet, Take 1 tablet by mouth every other day  bumetanide (BUMEX) 1 MG tablet, Take 1 tablet by mouth daily  Multiple Vitamins-Minerals (MULTIVITAMIN WOMEN PO), Take 1 tablet by mouth daily  acetaminophen (TYLENOL) 650 MG extended release tablet, Take 650 mg by mouth every 8 hours as needed for Pain  Riociguat (ADEMPAS) 2.5 MG TABS, Take 2.5 mg by mouth 3 times daily  docusate sodium (COLACE) 100 MG capsule, Take 100 mg by mouth as needed   aspirin 81 MG tablet, Take 81 mg by mouth daily     Current Medications:     Scheduled Meds:    midazolam  2 mg IntraVENous Once    [START ON 11/14/2021] allopurinol  100 mg Oral Daily    bumetanide  1 mg IntraVENous BID    midodrine  10 mg Oral Q8H    clindamycin (CLEOCIN) IV  600 mg IntraVENous Q8H    ketamine  50 mg IntraVENous Once    midazolam  2 mg IntraVENous Once    sodium chloride (Inhalant)  4 mL Nebulization 4 times per day    And    albuterol  2.5 mg Nebulization 4 times per day    insulin lispro  0-6 Units SubCUTAneous Q4H    phosphorus replacement protocol   Other RX Placeholder    potassium bicarb-citric acid  40 mEq Oral Once    tiotropium-olodaterol  2 puff Inhalation Daily    [Held by provider] aspirin  81 mg Oral Daily    pantoprazole  40 mg IntraVENous QAM    sodium hypochlorite   Irrigation Daily    Riociguat  2.5 mg Oral Q8H    sodium chloride flush  5-40 mL IntraVENous 2 times per day    [Held by provider] FLUoxetine  40 mg Oral Daily    [Held by provider] metoprolol tartrate  12.5 mg Oral BID     Continuous Infusions:    phenylephrine (KRISTAL-SYNEPHRINE) 50mg/250mL infusion Stopped (21 1552)    dextrose      dexmedetomidine 0.5 mcg/kg/hr (21 0750)    bumetanide 0.1 mg/mL infusion 0.5 mg/hr (21 0904)    sodium chloride Stopped (21 0200)    [Held by provider] sodium chloride 50 mL/hr at 10/28/21 2314     PRN Meds:  albuterol, fentanNYL, glucose, dextrose, glucagon (rDNA), dextrose, potassium chloride, magnesium sulfate, sodium phosphate IVPB **OR** sodium phosphate IVPB **OR** sodium phosphate IVPB **OR** sodium phosphate IVPB, polyethylene glycol, senna, acetaminophen, sodium chloride, sodium chloride flush    Input/Output:       I/O last 3 completed shifts: In: 1106.8 [I.V.:496.8; NG/GT:560; IV Piggyback:50]  Out: 101 [Stool:50].       Patient Vitals for the past 96 hrs (Last 3 readings):   Weight   21 0400 180 lb 8.9 oz (81.9 kg)   21 0500 179 lb 3.7 oz (81.3 kg)   21 0400 190 lb 4.1 oz (86.3 kg)       Vital Signs:   Temperature:  Temp: 98.2 °F (36.8 °C)  TMax:   Temp (24hrs), Av.3 °F (36.8 °C), Min:97.6 °F (36.4 °C), Max:100 °F (37.8 °C)    Respirations:  Resp: 22  Pulse:   Pulse: 73  BP:    BP: (!) 107/47  BP Range: Systolic (72DGC), NHH:583 , Min:94 , VCZ:809       Diastolic (19MWI), BDC:52, Min:41, Max:70      Physical Examination:     General:  Awake, slow to respond  HEENT: NC/AT/ MMM  Chest:               Coarse rhonchi  Cardiac:  S1 S2   Abdomen: Soft, non-tender,  Neuro:  No facial droop,   SKIN:  No rashes,   Extremities:  2+ edema    Labs:       Recent Labs     11/12/21  0810 11/12/21 2040 11/13/21  0830   WBC 16.3* 16.0* 20.0*   RBC 2.54* 3.03* 3.04*   HGB 7.2* 8.7* 8.7*   HCT 23.3* 27.6* 28.2*   MCV 91.7 91.1 92.8   MCH 28.3 28.7 28.6   MCHC 30.9* 31.5* 30.9*   PLT 67* 69* 76*   MPV 10.4 9.0* 9.5      BMP:   Recent Labs     11/12/21  0810 11/12/21 2040 11/13/21  0830    137 137   K 4.1 4.0 4.1    104 105   CO2 27 24 25   BUN 27* 35* 42*   CREATININE 0.9 1.2 1.5*   GLUCOSE 97 102 111*   CALCIUM 8.6 8.4* 8.5      Phosphorus:     Recent Labs     11/12/21  0810 11/12/21 2040 11/13/21  0830   PHOS 2.3* 2.9 3.4     Magnesium:    Recent Labs     11/12/21  0810 11/12/21 2040 11/13/21  0830   MG 2.2 2.3 2.0     Albumin:    Recent Labs     11/12/21  0810 11/12/21 2040 11/13/21  0830   LABALBU 3.1* 3.0* 3.1*     BNP:      Lab Results   Component Value Date    BNP 23 10/04/2017     RYLAN:    No results found for: RYLAN  SPEP:  Lab Results   Component Value Date    PROT 4.8 11/13/2021     UPEP:   No results found for: LABPE  C3:     Lab Results   Component Value Date    C3 93 10/25/2021     C4:     Lab Results   Component Value Date    C4 22 10/25/2021     MPO ANCA:   No results found for: MPO  PR3 ANCA:   No results found for: PR3  Anti-GBM:   No results found for: GBMABIGG  Hep BsAg:       No results found for: HEPBSAG  Hep C AB:          Lab Results   Component Value Date    HEPCAB Negative 10/25/2021       Urinalysis/Chemistries:      Lab Results   Component Value Date    NITRU NEGATIVE 11/03/2021    COLORU RED 11/03/2021    PHUR 6.5 11/03/2021    LABCAST NONE SEEN 11/03/2021    WBCUA 10-15W/CLUMPS 11/03/2021    RBCUA > 200 11/03/2021    MUCUS THREADS 10/25/2021    YEAST NONE SEEN 11/03/2021    BACTERIA FEW 11/03/2021    SPECGRAV 1.024 11/03/2021    LEUKOCYTESUR SMALL 11/03/2021    LEUKOCYTESUR LARGE 10/25/2021 UROBILINOGEN 0.2 11/03/2021    BILIRUBINUR NEGATIVE 11/03/2021    BLOODU LARGE 11/03/2021    GLUCOSEU NEGATIVE 03/06/2020    KETUA NEGATIVE 11/03/2021    AMORPHOUS OBSCURED 08/23/2019     Urine Sodium:   No results found for: MARION  Urine Potassium:  No results found for: KUR  Urine Chloride:  No results found for: CLUR  Urine Osmolarity:   Lab Results   Component Value Date    OSMOU 356 09/16/2019     Urine Protein:   No components found for: TOTALPROTEIN, URINE   Urine Creatinine:     Lab Results   Component Value Date    LABCREA 81.5 10/25/2021     Urine Eosinophils:  No components found for: UEOS        Impression and Plan:  1. ALVIN, multifactorial, urine output is poor. CRRT stopped yesterday. Will start bumex 1 mg IV BID and monitor response ,she is off vasopressors so will evaluate for HD needs in the morning  2. Hx of + ANCA, received pulse dose steroids, possible renal bx once more stable  3. Hypotension improved  4. Mucous plug s/p bronch  5. Acute hypoxic respiratory failure  6. Volume overload: diuretics as sabove  7. Pneumonia  8. Hydronephrosis s/p nephrostomy        Please don't hesitate to call with any questions.   Electronically signed by Radha Hirsch DO on 11/13/2021 at 9:35 AM

## 2021-11-13 NOTE — PROGRESS NOTES
[facility] THERAPY MISSED TREATMENT NOTE  [facility] ICU 4D      Date: 2021  Patient Name: Jean Pierre Graff        MRN: [id number]    : 1950  (73 y.o.)    REASON FOR MISSED TREATMENT:    Per RN Mindy urias and Kathleen Ortiz, patient remains on BiPAP after \"rough night\". Not appropriate for ST interventions. Pending patient appropriateness, will follow up Monday 11/15 as able.     Alicia Anaya M.S. [id number] [phone]

## 2021-11-14 ENCOUNTER — APPOINTMENT (OUTPATIENT)
Dept: GENERAL RADIOLOGY | Age: 71
DRG: 004 | End: 2021-11-14
Payer: MEDICARE

## 2021-11-14 LAB
ALBUMIN SERPL-MCNC: 2.5 G/DL (ref 3.5–5.1)
ALBUMIN SERPL-MCNC: 2.8 G/DL (ref 3.5–5.1)
ALBUMIN SERPL-MCNC: 2.8 G/DL (ref 3.5–5.1)
ALBUMIN SERPL-MCNC: 2.9 G/DL (ref 3.5–5.1)
ALP BLD-CCNC: 78 U/L (ref 38–126)
ALP BLD-CCNC: 80 U/L (ref 38–126)
ALP BLD-CCNC: 81 U/L (ref 38–126)
ALP BLD-CCNC: 84 U/L (ref 38–126)
ALT SERPL-CCNC: 11 U/L (ref 11–66)
ALT SERPL-CCNC: 12 U/L (ref 11–66)
ALT SERPL-CCNC: 12 U/L (ref 11–66)
ALT SERPL-CCNC: 13 U/L (ref 11–66)
ANION GAP SERPL CALCULATED.3IONS-SCNC: 6 MEQ/L (ref 8–16)
ANION GAP SERPL CALCULATED.3IONS-SCNC: 7 MEQ/L (ref 8–16)
AST SERPL-CCNC: 11 U/L (ref 5–40)
AST SERPL-CCNC: 12 U/L (ref 5–40)
AST SERPL-CCNC: 9 U/L (ref 5–40)
AST SERPL-CCNC: 9 U/L (ref 5–40)
BILIRUB SERPL-MCNC: 0.5 MG/DL (ref 0.3–1.2)
BILIRUB SERPL-MCNC: 0.6 MG/DL (ref 0.3–1.2)
BILIRUB SERPL-MCNC: 0.6 MG/DL (ref 0.3–1.2)
BILIRUB SERPL-MCNC: 0.7 MG/DL (ref 0.3–1.2)
BUN BLDV-MCNC: 20 MG/DL (ref 7–22)
BUN BLDV-MCNC: 25 MG/DL (ref 7–22)
BUN BLDV-MCNC: 31 MG/DL (ref 7–22)
BUN BLDV-MCNC: 37 MG/DL (ref 7–22)
CALCIUM IONIZED: 1.29 MMOL/L (ref 1.12–1.32)
CALCIUM IONIZED: 1.3 MMOL/L (ref 1.12–1.32)
CALCIUM IONIZED: 1.31 MMOL/L (ref 1.12–1.32)
CALCIUM IONIZED: 1.32 MMOL/L (ref 1.12–1.32)
CALCIUM SERPL-MCNC: 7.9 MG/DL (ref 8.5–10.5)
CALCIUM SERPL-MCNC: 7.9 MG/DL (ref 8.5–10.5)
CALCIUM SERPL-MCNC: 8.2 MG/DL (ref 8.5–10.5)
CALCIUM SERPL-MCNC: 8.2 MG/DL (ref 8.5–10.5)
CHLORIDE BLD-SCNC: 103 MEQ/L (ref 98–111)
CHLORIDE BLD-SCNC: 104 MEQ/L (ref 98–111)
CO2: 25 MEQ/L (ref 23–33)
CO2: 26 MEQ/L (ref 23–33)
CO2: 26 MEQ/L (ref 23–33)
CO2: 27 MEQ/L (ref 23–33)
CREAT SERPL-MCNC: 0.7 MG/DL (ref 0.4–1.2)
CREAT SERPL-MCNC: 0.9 MG/DL (ref 0.4–1.2)
CREAT SERPL-MCNC: 1 MG/DL (ref 0.4–1.2)
CREAT SERPL-MCNC: 1.2 MG/DL (ref 0.4–1.2)
ERYTHROCYTE [DISTWIDTH] IN BLOOD BY AUTOMATED COUNT: 18.7 % (ref 11.5–14.5)
ERYTHROCYTE [DISTWIDTH] IN BLOOD BY AUTOMATED COUNT: 18.7 % (ref 11.5–14.5)
ERYTHROCYTE [DISTWIDTH] IN BLOOD BY AUTOMATED COUNT: 18.9 % (ref 11.5–14.5)
ERYTHROCYTE [DISTWIDTH] IN BLOOD BY AUTOMATED COUNT: 19.1 % (ref 11.5–14.5)
ERYTHROCYTE [DISTWIDTH] IN BLOOD BY AUTOMATED COUNT: 63.8 FL (ref 35–45)
ERYTHROCYTE [DISTWIDTH] IN BLOOD BY AUTOMATED COUNT: 64.1 FL (ref 35–45)
ERYTHROCYTE [DISTWIDTH] IN BLOOD BY AUTOMATED COUNT: 64.5 FL (ref 35–45)
ERYTHROCYTE [DISTWIDTH] IN BLOOD BY AUTOMATED COUNT: 64.8 FL (ref 35–45)
GFR SERPL CREATININE-BSD FRML MDRD: 45 ML/MIN/1.73M2
GFR SERPL CREATININE-BSD FRML MDRD: 55 ML/MIN/1.73M2
GFR SERPL CREATININE-BSD FRML MDRD: 62 ML/MIN/1.73M2
GFR SERPL CREATININE-BSD FRML MDRD: 83 ML/MIN/1.73M2
GLUCOSE BLD-MCNC: 102 MG/DL (ref 70–108)
GLUCOSE BLD-MCNC: 104 MG/DL (ref 70–108)
GLUCOSE BLD-MCNC: 114 MG/DL (ref 70–108)
GLUCOSE BLD-MCNC: 115 MG/DL (ref 70–108)
GLUCOSE BLD-MCNC: 116 MG/DL (ref 70–108)
GLUCOSE BLD-MCNC: 117 MG/DL (ref 70–108)
GLUCOSE BLD-MCNC: 117 MG/DL (ref 70–108)
GLUCOSE BLD-MCNC: 120 MG/DL (ref 70–108)
HCT VFR BLD CALC: 24.8 % (ref 37–47)
HCT VFR BLD CALC: 25 % (ref 37–47)
HCT VFR BLD CALC: 26.8 % (ref 37–47)
HCT VFR BLD CALC: 27.8 % (ref 37–47)
HEMOGLOBIN: 7.4 GM/DL (ref 12–16)
HEMOGLOBIN: 7.5 GM/DL (ref 12–16)
HEMOGLOBIN: 8.2 GM/DL (ref 12–16)
HEMOGLOBIN: 8.4 GM/DL (ref 12–16)
MAGNESIUM: 2 MG/DL (ref 1.6–2.4)
MAGNESIUM: 2.1 MG/DL (ref 1.6–2.4)
MAGNESIUM: 2.1 MG/DL (ref 1.6–2.4)
MAGNESIUM: 2.3 MG/DL (ref 1.6–2.4)
MCH RBC QN AUTO: 28.5 PG (ref 26–33)
MCH RBC QN AUTO: 28.6 PG (ref 26–33)
MCH RBC QN AUTO: 28.6 PG (ref 26–33)
MCH RBC QN AUTO: 28.7 PG (ref 26–33)
MCHC RBC AUTO-ENTMCNC: 29.6 GM/DL (ref 32.2–35.5)
MCHC RBC AUTO-ENTMCNC: 30.2 GM/DL (ref 32.2–35.5)
MCHC RBC AUTO-ENTMCNC: 30.2 GM/DL (ref 32.2–35.5)
MCHC RBC AUTO-ENTMCNC: 30.6 GM/DL (ref 32.2–35.5)
MCV RBC AUTO: 93.4 FL (ref 81–99)
MCV RBC AUTO: 94.6 FL (ref 81–99)
MCV RBC AUTO: 95 FL (ref 81–99)
MCV RBC AUTO: 96.2 FL (ref 81–99)
PHOSPHORUS: 2.1 MG/DL (ref 2.4–4.7)
PHOSPHORUS: 2.2 MG/DL (ref 2.4–4.7)
PHOSPHORUS: 2.3 MG/DL (ref 2.4–4.7)
PHOSPHORUS: 2.9 MG/DL (ref 2.4–4.7)
PLATELET # BLD: 46 THOU/MM3 (ref 130–400)
PLATELET # BLD: 47 THOU/MM3 (ref 130–400)
PLATELET # BLD: 53 THOU/MM3 (ref 130–400)
PLATELET # BLD: 61 THOU/MM3 (ref 130–400)
PMV BLD AUTO: 10.1 FL (ref 9.4–12.4)
PMV BLD AUTO: 10.7 FL (ref 9.4–12.4)
PMV BLD AUTO: 11.6 FL (ref 9.4–12.4)
PMV BLD AUTO: 9.3 FL (ref 9.4–12.4)
POTASSIUM REFLEX MAGNESIUM: 4.1 MEQ/L (ref 3.5–5.2)
POTASSIUM REFLEX MAGNESIUM: 4.2 MEQ/L (ref 3.5–5.2)
RBC # BLD: 2.6 MILL/MM3 (ref 4.2–5.4)
RBC # BLD: 2.61 MILL/MM3 (ref 4.2–5.4)
RBC # BLD: 2.87 MILL/MM3 (ref 4.2–5.4)
RBC # BLD: 2.94 MILL/MM3 (ref 4.2–5.4)
SCAN OF BLOOD SMEAR: NORMAL
SODIUM BLD-SCNC: 135 MEQ/L (ref 135–145)
SODIUM BLD-SCNC: 136 MEQ/L (ref 135–145)
SODIUM BLD-SCNC: 136 MEQ/L (ref 135–145)
SODIUM BLD-SCNC: 137 MEQ/L (ref 135–145)
TOTAL PROTEIN: 4.1 G/DL (ref 6.1–8)
TOTAL PROTEIN: 4.5 G/DL (ref 6.1–8)
TOTAL PROTEIN: 4.6 G/DL (ref 6.1–8)
TOTAL PROTEIN: 4.7 G/DL (ref 6.1–8)
WBC # BLD: 18.9 THOU/MM3 (ref 4.8–10.8)
WBC # BLD: 19 THOU/MM3 (ref 4.8–10.8)
WBC # BLD: 20.6 THOU/MM3 (ref 4.8–10.8)
WBC # BLD: 22.4 THOU/MM3 (ref 4.8–10.8)

## 2021-11-14 PROCEDURE — 2580000003 HC RX 258: Performed by: INTERNAL MEDICINE

## 2021-11-14 PROCEDURE — 90945 DIALYSIS ONE EVALUATION: CPT | Performed by: INTERNAL MEDICINE

## 2021-11-14 PROCEDURE — 71045 X-RAY EXAM CHEST 1 VIEW: CPT

## 2021-11-14 PROCEDURE — 82948 REAGENT STRIP/BLOOD GLUCOSE: CPT

## 2021-11-14 PROCEDURE — 82330 ASSAY OF CALCIUM: CPT

## 2021-11-14 PROCEDURE — 36415 COLL VENOUS BLD VENIPUNCTURE: CPT

## 2021-11-14 PROCEDURE — 2700000000 HC OXYGEN THERAPY PER DAY

## 2021-11-14 PROCEDURE — 6360000002 HC RX W HCPCS: Performed by: NURSE PRACTITIONER

## 2021-11-14 PROCEDURE — 94669 MECHANICAL CHEST WALL OSCILL: CPT

## 2021-11-14 PROCEDURE — 94660 CPAP INITIATION&MGMT: CPT

## 2021-11-14 PROCEDURE — 6370000000 HC RX 637 (ALT 250 FOR IP): Performed by: INTERNAL MEDICINE

## 2021-11-14 PROCEDURE — 2000000000 HC ICU R&B

## 2021-11-14 PROCEDURE — 6360000002 HC RX W HCPCS: Performed by: STUDENT IN AN ORGANIZED HEALTH CARE EDUCATION/TRAINING PROGRAM

## 2021-11-14 PROCEDURE — 51700 IRRIGATION OF BLADDER: CPT

## 2021-11-14 PROCEDURE — 6370000000 HC RX 637 (ALT 250 FOR IP): Performed by: STUDENT IN AN ORGANIZED HEALTH CARE EDUCATION/TRAINING PROGRAM

## 2021-11-14 PROCEDURE — 2500000003 HC RX 250 WO HCPCS: Performed by: STUDENT IN AN ORGANIZED HEALTH CARE EDUCATION/TRAINING PROGRAM

## 2021-11-14 PROCEDURE — 99291 CRITICAL CARE FIRST HOUR: CPT | Performed by: INTERNAL MEDICINE

## 2021-11-14 PROCEDURE — 2580000003 HC RX 258: Performed by: EMERGENCY MEDICINE

## 2021-11-14 PROCEDURE — 84100 ASSAY OF PHOSPHORUS: CPT

## 2021-11-14 PROCEDURE — 80053 COMPREHEN METABOLIC PANEL: CPT

## 2021-11-14 PROCEDURE — C9113 INJ PANTOPRAZOLE SODIUM, VIA: HCPCS | Performed by: NURSE PRACTITIONER

## 2021-11-14 PROCEDURE — 94640 AIRWAY INHALATION TREATMENT: CPT

## 2021-11-14 PROCEDURE — 6360000002 HC RX W HCPCS: Performed by: INTERNAL MEDICINE

## 2021-11-14 PROCEDURE — 83735 ASSAY OF MAGNESIUM: CPT

## 2021-11-14 PROCEDURE — 85027 COMPLETE CBC AUTOMATED: CPT

## 2021-11-14 PROCEDURE — 99232 SBSQ HOSP IP/OBS MODERATE 35: CPT | Performed by: NURSE PRACTITIONER

## 2021-11-14 PROCEDURE — 94761 N-INVAS EAR/PLS OXIMETRY MLT: CPT

## 2021-11-14 PROCEDURE — 2580000003 HC RX 258: Performed by: STUDENT IN AN ORGANIZED HEALTH CARE EDUCATION/TRAINING PROGRAM

## 2021-11-14 RX ADMIN — Medication: at 03:42

## 2021-11-14 RX ADMIN — SODIUM CHLORIDE SOLN NEBU 3% 4 ML: 3 NEBU SOLN at 23:48

## 2021-11-14 RX ADMIN — CLINDAMYCIN PHOSPHATE 600 MG: 600 INJECTION, SOLUTION INTRAVENOUS at 23:00

## 2021-11-14 RX ADMIN — ALBUTEROL SULFATE 2.5 MG: 2.5 SOLUTION RESPIRATORY (INHALATION) at 01:49

## 2021-11-14 RX ADMIN — ALBUTEROL SULFATE 2.5 MG: 2.5 SOLUTION RESPIRATORY (INHALATION) at 23:48

## 2021-11-14 RX ADMIN — PANTOPRAZOLE SODIUM 40 MG: 40 INJECTION, POWDER, FOR SOLUTION INTRAVENOUS at 08:14

## 2021-11-14 RX ADMIN — PHENYLEPHRINE HYDROCHLORIDE 45 MCG/MIN: 10 INJECTION INTRAVENOUS at 05:30

## 2021-11-14 RX ADMIN — Medication: at 08:08

## 2021-11-14 RX ADMIN — ALBUTEROL SULFATE 2.5 MG: 2.5 SOLUTION RESPIRATORY (INHALATION) at 18:04

## 2021-11-14 RX ADMIN — POTASSIUM & SODIUM PHOSPHATES POWDER PACK 280-160-250 MG 500 MG: 280-160-250 PACK at 13:17

## 2021-11-14 RX ADMIN — MIDODRINE HYDROCHLORIDE 10 MG: 10 TABLET ORAL at 03:18

## 2021-11-14 RX ADMIN — SODIUM HYPOCHLORITE: 1.25 SOLUTION TOPICAL at 08:23

## 2021-11-14 RX ADMIN — MIDODRINE HYDROCHLORIDE 10 MG: 10 TABLET ORAL at 23:08

## 2021-11-14 RX ADMIN — CLINDAMYCIN PHOSPHATE 600 MG: 600 INJECTION, SOLUTION INTRAVENOUS at 09:30

## 2021-11-14 RX ADMIN — Medication: at 14:04

## 2021-11-14 RX ADMIN — ALLOPURINOL 100 MG: 100 TABLET ORAL at 08:14

## 2021-11-14 RX ADMIN — SODIUM CHLORIDE SOLN NEBU 3% 4 ML: 3 NEBU SOLN at 06:46

## 2021-11-14 RX ADMIN — Medication: at 19:51

## 2021-11-14 RX ADMIN — MIDODRINE HYDROCHLORIDE 10 MG: 10 TABLET ORAL at 13:17

## 2021-11-14 RX ADMIN — ALBUTEROL SULFATE 2.5 MG: 2.5 SOLUTION RESPIRATORY (INHALATION) at 06:46

## 2021-11-14 RX ADMIN — CLINDAMYCIN PHOSPHATE 600 MG: 600 INJECTION, SOLUTION INTRAVENOUS at 15:04

## 2021-11-14 RX ADMIN — POTASSIUM & SODIUM PHOSPHATES POWDER PACK 280-160-250 MG 500 MG: 280-160-250 PACK at 17:41

## 2021-11-14 RX ADMIN — SODIUM CHLORIDE 0.4 MCG/KG/HR: 9 INJECTION, SOLUTION INTRAVENOUS at 17:39

## 2021-11-14 RX ADMIN — PHENYLEPHRINE HYDROCHLORIDE 100 MCG/MIN: 10 INJECTION INTRAVENOUS at 19:03

## 2021-11-14 RX ADMIN — SODIUM CHLORIDE SOLN NEBU 3% 4 ML: 3 NEBU SOLN at 18:04

## 2021-11-14 RX ADMIN — SODIUM CHLORIDE, PRESERVATIVE FREE 10 ML: 5 INJECTION INTRAVENOUS at 21:09

## 2021-11-14 RX ADMIN — ALBUTEROL SULFATE 2.5 MG: 2.5 SOLUTION RESPIRATORY (INHALATION) at 13:41

## 2021-11-14 RX ADMIN — FENTANYL CITRATE 25 MCG: 0.05 INJECTION, SOLUTION INTRAMUSCULAR; INTRAVENOUS at 17:12

## 2021-11-14 RX ADMIN — POLYETHYLENE GLYCOL 3350 17 G: 17 POWDER, FOR SOLUTION ORAL at 21:09

## 2021-11-14 RX ADMIN — Medication: at 08:09

## 2021-11-14 RX ADMIN — SODIUM CHLORIDE 0.6 MCG/KG/HR: 9 INJECTION, SOLUTION INTRAVENOUS at 04:24

## 2021-11-14 RX ADMIN — Medication: at 03:41

## 2021-11-14 RX ADMIN — Medication: at 19:56

## 2021-11-14 RX ADMIN — SODIUM CHLORIDE, PRESERVATIVE FREE 10 ML: 5 INJECTION INTRAVENOUS at 08:14

## 2021-11-14 RX ADMIN — ACETAMINOPHEN 650 MG: 325 TABLET ORAL at 08:25

## 2021-11-14 RX ADMIN — ACETAMINOPHEN 650 MG: 325 TABLET ORAL at 13:22

## 2021-11-14 RX ADMIN — Medication: at 14:34

## 2021-11-14 RX ADMIN — SODIUM CHLORIDE SOLN NEBU 3% 4 ML: 3 NEBU SOLN at 01:48

## 2021-11-14 RX ADMIN — SODIUM CHLORIDE SOLN NEBU 3% 4 ML: 3 NEBU SOLN at 13:41

## 2021-11-14 RX ADMIN — Medication: at 19:58

## 2021-11-14 RX ADMIN — TIOTROPIUM BROMIDE AND OLODATEROL 2 PUFF: 3.124; 2.736 SPRAY, METERED RESPIRATORY (INHALATION) at 08:50

## 2021-11-14 RX ADMIN — Medication: at 03:52

## 2021-11-14 ASSESSMENT — PAIN SCALES - GENERAL
PAINLEVEL_OUTOF10: 0
PAINLEVEL_OUTOF10: 0
PAINLEVEL_OUTOF10: 4
PAINLEVEL_OUTOF10: 8

## 2021-11-14 NOTE — PROGRESS NOTES
Renal Progress Note  [facility]    Patient :  Ml Lopez; 73 y.o. MRN# [id number]  Location:  4D-09/009-A  Attending:  Kavya Austin MD  Admit Date:  10/27/2021   Hospital Day: 25      Subjective:     Nephrology is following the patient for ALVIN. Patient seen and examined. CRRT was restarted overnight, patient with worsening respiratory distress and poor response to bumex. Currently on CRRT, tolerating net  ml/hour. On Benjamin at 45 mcg. On bipap at 60 % FiO2. Very minimal urine output. Still on CBI. Urine is clear. Outpatient Medications:     Medications Prior to Admission: metoprolol tartrate (LOPRESSOR) 25 MG tablet, Take 0.5 tablets by mouth 2 times daily  sodium hypochlorite (DAKINS) 0.125 % SOLN external solution, Apply Dakin's moistened gauze dressings to wound twice daily and as needed.   sodium chloride 1 g tablet, Take 1 tablet by mouth 2 times daily  allopurinol (ZYLOPRIM) 300 MG tablet, Take 1 tablet by mouth daily  FLUoxetine (PROZAC) 40 MG capsule, Take 1 capsule by mouth daily  potassium chloride (KLOR-CON M) 10 MEQ extended release tablet, Take 1 tablet by mouth every other day  bumetanide (BUMEX) 1 MG tablet, Take 1 tablet by mouth daily  Multiple Vitamins-Minerals (MULTIVITAMIN WOMEN PO), Take 1 tablet by mouth daily  acetaminophen (TYLENOL) 650 MG extended release tablet, Take 650 mg by mouth every 8 hours as needed for Pain  Riociguat (ADEMPAS) 2.5 MG TABS, Take 2.5 mg by mouth 3 times daily  docusate sodium (COLACE) 100 MG capsule, Take 100 mg by mouth as needed   aspirin 81 MG tablet, Take 81 mg by mouth daily     Current Medications:     Scheduled Meds:    midazolam  2 mg IntraVENous Once    allopurinol  100 mg Oral Daily    midodrine  10 mg Oral Q8H    clindamycin (CLEOCIN) IV  600 mg IntraVENous Q8H    ketamine  50 mg IntraVENous Once    midazolam  2 mg IntraVENous Once    sodium chloride (Inhalant)  4 mL Nebulization 4 times per day    And    albuterol  2.5 mg Nebulization 4 times per day    insulin lispro  0-6 Units SubCUTAneous Q4H    phosphorus replacement protocol   Other RX Placeholder    potassium bicarb-citric acid  40 mEq Oral Once    tiotropium-olodaterol  2 puff Inhalation Daily    [Held by provider] aspirin  81 mg Oral Daily    pantoprazole  40 mg IntraVENous QAM    sodium hypochlorite   Irrigation Daily    Riociguat  2.5 mg Oral Q8H    sodium chloride flush  5-40 mL IntraVENous 2 times per day    [Held by provider] FLUoxetine  40 mg Oral Daily    [Held by provider] metoprolol tartrate  12.5 mg Oral BID     Continuous Infusions:    prismaSATE BGK 4/2.5 1,000 mL/hr at 21 0809    prismaSol BGK 4/2.5 1,000 mL/hr at 21 0808    prismaSol BGK 4/2.5 1,000 mL/hr at 21 0808    phenylephrine (KRISTAL-SYNEPHRINE) 50mg/250mL infusion 45 mcg/min (21 09)    dextrose      dexmedetomidine 0.5 mcg/kg/hr (21 09)    bumetanide 0.1 mg/mL infusion 0.5 mg/hr (21 0904)    sodium chloride Stopped (21 1445)    [Held by provider] sodium chloride 50 mL/hr at 10/28/21 2314     PRN Meds:  albuterol, fentanNYL, glucose, dextrose, glucagon (rDNA), dextrose, potassium chloride, magnesium sulfate, sodium phosphate IVPB **OR** sodium phosphate IVPB **OR** sodium phosphate IVPB **OR** sodium phosphate IVPB, polyethylene glycol, senna, acetaminophen, sodium chloride, sodium chloride flush    Input/Output:       I/O last 3 completed shifts: In: 1018.5 [I.V.:555.5; NG/GT:463]  Out:  [Urine:498; Stool:50].       Patient Vitals for the past 96 hrs (Last 3 readings):   Weight   21 0400 180 lb 8.9 oz (81.9 kg)   21 0500 179 lb 3.7 oz (81.3 kg)   21 0400 190 lb 4.1 oz (86.3 kg)       Vital Signs:   Temperature:  Temp: 98.7 °F (37.1 °C)  TMax:   Temp (24hrs), Av.5 °F (36.9 °C), Min:97.7 °F (36.5 °C), Max:99.1 °F (37.3 °C)    Respirations:  Resp: 21  Pulse:   Pulse: 77  BP:    BP: (!) 122/53  BP Range: Systolic (03AWZ), TVW:409 , Min:77 , RPV:204       Diastolic (90UUM), JHV:90, Min:38, Max:78      Physical Examination:     General:  Asleep, on bipap  HEENT: NC/AT/ MMM  Chest:               Rales at bases  Cardiac:  S1 S2   Abdomen: Soft, non-tender,  Neuro:  No facial droop,   SKIN:  No rashes,   Extremities:  Diffuse anasarca    Labs:       Recent Labs     11/13/21 1948 11/14/21 0315 11/14/21  0850   WBC 15.8* 20.6* 18.9*   RBC 2.90* 2.94* 2.87*   HGB 8.2* 8.4* 8.2*   HCT 27.1* 27.8* 26.8*   MCV 93.4 94.6 93.4   MCH 28.3 28.6 28.6   MCHC 30.3* 30.2* 30.6*   PLT 61* 61* 53*   MPV 9.7 10.1 9.3*      BMP:   Recent Labs     11/13/21 1948 11/14/21 0315 11/14/21  0850    135 136   K 4.2 4.1 4.2    103 104   CO2 25 25 26   BUN 50* 37* 31*   CREATININE 1.7* 1.2 1.0   GLUCOSE 121* 115* 120*   CALCIUM 8.3* 8.2* 8.2*      Phosphorus:     Recent Labs     11/13/21 1948 11/14/21 0315 11/14/21  0850   PHOS 4.0 2.9 2.2*     Magnesium:    Recent Labs     11/13/21 1948 11/14/21 0315 11/14/21  0850   MG 2.0 2.1 2.1     Albumin:    Recent Labs     11/13/21 1948 11/14/21 0315 11/14/21  0850   LABALBU 2.8* 2.9* 2.8*     BNP:      Lab Results   Component Value Date    BNP 23 10/04/2017     RYLAN:    No results found for: RYLAN  SPEP:  Lab Results   Component Value Date    PROT 4.5 11/14/2021     UPEP:   No results found for: LABPE  C3:     Lab Results   Component Value Date    C3 93 10/25/2021     C4:     Lab Results   Component Value Date    C4 22 10/25/2021     MPO ANCA:   No results found for: MPO  PR3 ANCA:   No results found for: PR3  Anti-GBM:   No results found for: GBMABIGG  Hep BsAg:       No results found for: HEPBSAG  Hep C AB:          Lab Results   Component Value Date    HEPCAB Negative 10/25/2021       Urinalysis/Chemistries:      Lab Results   Component Value Date    NITRU NEGATIVE 11/03/2021    COLORU RED 11/03/2021    PHUR 6.5 11/03/2021    LABCAST NONE SEEN 11/03/2021    WBCUA 10-15W/CLUMPS 11/03/2021    RBCUA > 200 11/03/2021    MUCUS THREADS 10/25/2021    YEAST NONE SEEN 11/03/2021    BACTERIA FEW 11/03/2021    SPECGRAV 1.024 11/03/2021    LEUKOCYTESUR SMALL 11/03/2021    LEUKOCYTESUR LARGE 10/25/2021    UROBILINOGEN 0.2 11/03/2021    BILIRUBINUR NEGATIVE 11/03/2021    BLOODU LARGE 11/03/2021    GLUCOSEU NEGATIVE 03/06/2020    KETUA NEGATIVE 11/03/2021    AMORPHOUS OBSCURED 08/23/2019     Urine Sodium:   No results found for: MARION  Urine Potassium:  No results found for: KUR  Urine Chloride:  No results found for: CLUR  Urine Osmolarity:   Lab Results   Component Value Date    OSMOU 356 09/16/2019     Urine Protein:   No components found for: TOTALPROTEIN, URINE   Urine Creatinine:     Lab Results   Component Value Date    LABCREA 81.5 10/25/2021     Urine Eosinophils:  No components found for: UEOS        Impression and Plan:  1. ALVIN likely due to ATN. Volume overload with minimal urine output, restarted on CRRT last night. Will increase UF to 150 ml/hour  -serial labs  -replace lytes per protocol    2. Hx of + ANCA, received pulse dose steroids, possible renal bx once more stable  3. Hypotension , on low dose vasopressors  4. Mucous plug s/p bronch  5. Acute hypoxic respiratory failure  6. Volume overload: diuretics as sabove  7. Pneumonia  8. Hydronephrosis s/p nephrostomy  9. Thrombocytopenia  10. anemia        Please don't hesitate to call with any questions.   Electronically signed by Milton Katz DO on 11/14/2021 at 9:35 AM

## 2021-11-14 NOTE — PROGRESS NOTES
Patient extubated, on BIPAP. On CVVH. Left nephrostomy tube flushing well. On CBI at 50 ml/hour. Family at bedside. Vitals:    11/14/21 0715 11/14/21 0730 11/14/21 0745 11/14/21 0855   BP: (!) 116/49 (!) 113/57 (!) 122/53    Pulse: 64 62 77    Resp: 26 25 27 21   Temp:       TempSrc:       SpO2: 97% 95% 100%    Weight:       Height:             Intake/Output Summary (Last 24 hours) at 11/14/2021 0943  Last data filed at 11/14/2021 0900  Gross per 24 hour   Intake 1081.48 ml   Output 2352 ml   Net -1270.52 ml       Labs  Recent Labs     11/13/21  1948 11/14/21  0315 11/14/21  0850   WBC 15.8* 20.6* 18.9*   HGB 8.2* 8.4* 8.2*   HCT 27.1* 27.8* 26.8*   PLT 61* 61* 53*    135 136   K 4.2 4.1 4.2    103 104   CO2 25 25 26   BUN 50* 37* 31*   CREATININE 1.7* 1.2 1.0   MG 2.0 2.1 2.1   PHOS 4.0 2.9 2.2*   CALCIUM 8.3* 8.2* 8.2*   Urine culture: No growth (11/2 and 10/27)  Left nephrostomy tube culture: Staph epidermis, MRSE. Very light growth. Exam  General: Extubated, asleep. Lungs: Diminished bilaterally. No r/r/w. Heart: RRR. S1/S2. Abdomen: Soft. Distended. Left nephrostomy tube with bloody drainage. Hypoactive bowel sounds. :  Padilla with light yellow urine. Extremities: UE and LE with 2+ pitting edema bilaterally      Assessment and Plan    1. ALVIN- Multifactorial (sepsis, left hydronephrosis, contrast). Nephrology consulted. Started CVVH on 11/5/21.     2. Gross Hematuria- CBI running at 50 ml/hour. Urine has remained clear. Will stop CBI. Call urology with any return of hematuria.     3. Pyelonephritis- Perinephric stranding on CT abdomen and pelvis with a small area concerning for developing abscess in the region of the left nephrostomy tube. Original and subsequent CT on 11/9/21 are stable. Will continue to follow peripherally. WBC up to 20, afebrile.     4. Left Staghorn Calculus- Nephrostomy tube place 11/2/21. Will need treated when patient stable.  Tube flushes without problems.     5. Acute Blood Loss Anemia- Has received multiple transfusions. Heparin stopped. Unit pending today.     6. Respiratory Failure secondary to Pneumonia- on bipap. On Clindamycin. Completed Rocephin.     7. 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 8. Left Main Stem Clot- Status post bronchoscopy (11/11/21, and repeat bronch 11-).        Electronically signed by LANE Blevins CNP on 11/14/2021 at 9:43 AM

## 2021-11-14 NOTE — PROGRESS NOTES
CRITICAL CARE PROGRESS NOTE      Patient:  Tamera Louise    Unit/Bed:4D-09/009-A  YOB: 1950  MRN: [id number]   PCP: Dhaval Ramos MD  Date of Admission: 10/27/2021  Chief Complaint:- Shortness of breath    Assessment and Plan:    1. Acute combined hypercapnic and hypoxic respiratory failure: Secondary to severe pneumonia with left-sided pleural effusion and repeated mucous plugging. Intubated 10/27/21. Extubated following successful SBT on 11/9/21. Chest x-ray today shows combination of left lung atelectasis most likely secondary to mucous plugging in addition to pleural effusion, she is a status post bronchoscopy with mucous plug removal from the left mainstem on 11/13. She is currently on BiPAP, continue MetaNeb. Restarted CVVHD last evening as the patient had poor response to Bumex drip. She is volume overloaded with bilateral pleural effusion. Hoping for the pleural effusion to improve with CVVHD, if no improvement we may need to place chest tube. 2. Severe bilateral multiorganism pneumonia: PCR positive Staph w/ MECA gene and adenovirus consistent w/ MRSA colonization. Culture growing MSSA. Repeat culture 11/5/21 shows coag positive staph completed 14 days Vancomycin (started 10/28/21) and completed 5 days Rocephin Clindamycin started for coverage of PVL as patient clinically worsening following 14 days Vancomycin for MRSA coverage. Underwent bronchoscopy for left mainstem mucus plug noted CXR on 11/11/21. Day 4/8 clindamycin . follow-up cultures from the bronchial washing that was obtained on 11/13. 3. Septic Shock: 2/2 severe PNA. CI 6.3 NE d/c 2/2 worsening afib RVR. NE d/c on 11/3/21 2/2 worsening afib/RVR. She was on pressor support w/ phenylephrine. She is off pressors. 4. Stage II ALVIN on CKD 3: Suspect post renal etiology 2/2 staghorn calculi. Nephrology following.  CRRT initiated 11/5/21 will continue for UF removal that was continued until 11/12, the patient is off CRRT. 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 ANCA associated Abs show elevated anti MPO elevated 416 and serine proteinase 3 IgG WNL. Will need Bx for confirmation. Mitochondrial Abs pending, Anti-DS-DNA negative, Anti histone negative, Anti Smith,and  Anti-Savana WNL,  GBM antibodies negative, C3/C4 levels normal, elevated kappa/lambda free light chains with normal ratio. No plans per Bx per nephrology. Fluid collection noted on left renal unlikely abscess as patient remains afebrile. She needs more fluid removal.  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 Will need Renal Bx for confirmation once stable. Discussed case w/ Nephrology. Completed pulse dose steroids starting on 11/8/21 w/  10mg/kg methylprednisolone for 3 days. SSI started for coverage. Not candidate for Rituxin/TPE 2/2 existing infection. No diffuse alveolar hemorrhage. 6. Hydronephrosis 2/2 Left-sided staghorn calculi: Urology following. Percutaneous drainage tube 11/2/21 IR. Low output following tube placement. Increased flow on CBI per urology recs. Still flushing clots. Perinephric stranding noted on CT abdomen and pelvis w/ air fluid region discontiguous with region of nephrostomy tube insertion raised initial concern for abscess (<3 cm) vs emphysematous pyelonephritis when discussed with radiologist. Received 5 days empiric therapy w/ cefepime (started 11/5/21). On Vancomycin per above. Culture of nephrostomy tube aspirate from 11/5/21 shows staph epidermidis (vancomycin sensitive). 7. Acute blood loss anemia: Suspect consumptive process. Leading differential , smear, reticulocytes, LDH, Haptoglobin, MERCEDES, B12/folate  pendingsuspect 2/2 hemolysis on CRRT. PRBC x1 transfused 10/31/21, 11/4/21, 11/5/21, x2 11/6/21, x3 11/8/21. Heparin stopped (dialysis dose still running). Holding ASA. 8. Thrombocytopenia: suspect consumptive process. Workup per above. Not heparin per above. 9. Hematuria: s/p perc tube placement. CBI per above. Stopped heparin, holding ASA. 10. Suspected Dysphagia: 2/2 prolonged intubation. SLP eval pending  11. Bilateral Pleural Effusion:   Associated volume overload in context of renal failure. Interval enlargement noted on CT 11/1/21. 12. Suspected COPD: current every day smoker. Obstructive process noted on flow volume loop on ventilator. Started empiric therapy with Stiolto. Recommend formal PFT once improved  13. PAH/chronic RV failure NYHA II: EF 55 to 60% G2 DD TTE 5/4/2021. RHC showed [facility] with elevated PCWP. Treated w/ Riociguat. 14. Normocytic Anemia: 2/2 hematuria s/p perc tube placement w/ Iron studies consistent w/  MERCEDES vs Anemia chronic disease. B12 WNL, Folate low, Absreticulocyte index 1.9% Soluable transferrin pending. Calculated Iron deficit 827mg. Will start Venofer replace folate once ABx stopped  15. Cholelithiasis: w/ dilated CBD. Noted on 10/30/2021 US liver/GB. GI following. Does not suspect choledocolithiasis at this time. May consider HIDA scan once stable   16. Leukocytosis: 2/2 to pulse dose steroids per above  17. Stage III decubitus ulcer with inferior tunneling: S/p excision VAC placement 8/21. Low suspicion for active infection  18. Moderate malnutrition: Pre-albumin 14.4 w/ notable cachexia  19. Chronic tobacco use:   cessation  20. Mild AS: Noted on previous TTE not appreciated on repeat imaging  21. Gout:  W/o exacerbation. Continue allopurinol  22. BARBER: non compliant w/ CPAP  23. New onset atrial fibrillation with rapid ventricular response (resolved): now in NSR s/p DCCV 11/3/21 Stopped Heparin per hematuria w/ anemia requiring transfusion & and now in NSR. Stopped Amiodarone 11/8/21. Holding metoprolol 2/2 hypotension  24. Hyperkalemia (resolved): 2/2 ALVIN  25. Hyponatremia (resolved): Suspect 2/2 renal failure. 26. Nutrition: on TF @30 ml       INITIAL H AND P AND ICU COURSE:  71F admitted to [facility] 10/27/21 w/ SOB.  Current smoker w/ PMH PAH grp 3, HFpEF, stage III sacral settings this AM preclude SBT. Volume overloaded on exam. UF increased to 125 cc/hr this AM. Will attempt to wean vent settings further further. Coag neg staph growing on nephrostomy tube aspirate. Suspect contamination. Continue existing ABx    11/9/21: Pulse dose steroids initiated overnight. Started on SSI. Urine output now improving 75cc/hr. Transfused 3 units PRBCs yesterday for Improve DO2. MERCEDES noted w/ 875 mg deficit. Will replace once steroids/ABx completed. 11/10/21 Extubated overnight. SLP eval today. Continue BiPAP while asleep w/ transition to NC while awake as tolerated. No tachypnea. Day 3 pulse dose steroids. Continued bloody drainage [facility]rostomy tube. [facility]     11/11/21 Worsening bilateral infiltrates with SpO2 <90% on HFNC. Continued pulmonary hygiene w/ nebulized hypertonic saline, acapella and breathing treatments in addition to deep suctioning. Continued on Vancomycin for MRSA PNA. Plan for family meeting this AM to discuss goals of care. 11/12/21 Follow-up discussion from family meeting. Patient nodded agreement with full code status including reintubation leading to tracheostomy and PEG placement and continued hemodialysis if needed. Patient nodded agreement and understanding with these decisions. CXR yesterday evening did show left mainstem mucus plugging with cutoff sign and absent breath sounds and did undergo emergent bronchoscopy overnight on 11/12/21. Diminished breath sounds and worsening respiratory status this AM concerning for continued plugging. Stat chest X-ray ordered for confirmation. 11/13/21: Respiratory status continues to decline, she is requiring BiPAP with FiO2 of 70%. Chest x-ray shows complete opacification of the left lung secondary to mucous plugging, also the patient has left-sided pleural effusion. Plan for bronchoscopy. Past Medical History:  Per HPI. Family History:  DM in father and sister. Social History: chronic smoker.     ROS   Patient reports fatigue,  A 12 point review of systems was otherwise negative    Scheduled Meds:   potassium & sodium phosphates  2 packet Oral Once    midazolam  2 mg IntraVENous Once    allopurinol  100 mg Oral Daily    midodrine  10 mg Oral Q8H    clindamycin (CLEOCIN) IV  600 mg IntraVENous Q8H    ketamine  50 mg IntraVENous Once    midazolam  2 mg IntraVENous Once    sodium chloride (Inhalant)  4 mL Nebulization 4 times per day    And    albuterol  2.5 mg Nebulization 4 times per day    insulin lispro  0-6 Units SubCUTAneous Q4H    phosphorus replacement protocol   Other RX Placeholder    potassium bicarb-citric acid  40 mEq Oral Once    tiotropium-olodaterol  2 puff Inhalation Daily    [Held by provider] aspirin  81 mg Oral Daily    pantoprazole  40 mg IntraVENous QAM    sodium hypochlorite   Irrigation Daily    Riociguat  2.5 mg Oral Q8H    sodium chloride flush  5-40 mL IntraVENous 2 times per day    [Held by provider] FLUoxetine  40 mg Oral Daily    [Held by provider] metoprolol tartrate  12.5 mg Oral BID     Continuous Infusions:   prismaSATE BGK 4/2.5 1,000 mL/hr at 11/14/21 0809    prismaSol BGK 4/2.5 1,000 mL/hr at 11/14/21 0808    prismaSol BGK 4/2.5 1,000 mL/hr at 11/14/21 0808    phenylephrine (KRISTAL-SYNEPHRINE) 50mg/250mL infusion 95 mcg/min (11/14/21 1300)    dextrose      dexmedetomidine 0.2 mcg/kg/hr (11/14/21 1300)    bumetanide 0.1 mg/mL infusion 0.5 mg/hr (11/05/21 0904)    sodium chloride Stopped (11/13/21 1445)    [Held by provider] sodium chloride 50 mL/hr at 10/28/21 2314       PHYSICAL EXAMINATION:  T:  98.2. P: 73 RR:  22. B/P: 104/47 FiO2: 65. O2 Sat:  95%. I/O: 656/2530 net -1873  Body mass index is 39.07 kg/m². GCS:   8  PC: 16 PEEP: 8: TV: 400: RRTotal: 11: General: Acute on chronically ill-appearing elderly white female  HEENT:  normocephalic and atraumatic. No scleral icterus. PERR  Neck: supple. No Thyromegaly.  Left subclavian dialysis catheter   Lungs: Diffuse rhonchi appreciated. Absent left lower lobe breath sounds. No retractions  Cardiac: RRR. No JVD. Abdomen: soft. Nontender. ,npehrostomy tube with bloody drainage  Extremities:  +1 pitting edema x4. (interval improvement noted) No clubbing, cyanosis   Vasculature: capillary refill < 3 seconds. Palpable dorsalis pedis pulses. Skin:  warm and dry. Psych: Alert and oriented to person place and time, affect appropriate  Lymph:  No supraclavicular adenopathy. Neurologic:  No focal deficit. No seizures. Data: (All radiographs, tracings, PFTs, and imaging are personally viewed and interpreted unless otherwise noted).  CMP: Sodium 138 potassium 4.2 chloride 104 bicarb 25 BUN/creatinine 26/0.8 anion gap 9 ionized calcium 1.32 GFR 71 magnesium 2.4 glucose 102 calcium 8.6 phosphorus 2.6 albumin 3.1 alk phos 82 ALT/AST 10/9 total bilirubin 1.9 total protein 4.9   CBC WBC 15.3 H&H 7.3/23.3 platelets 73   SARS-CoV-2 NAAT negative on 10/27/2021   Telemetry shows NSR   Chest x-ray 10/31/2021 demonstrated diffuse bilateral patchy opacities with left-sided pleural effusion and cardiomegaly   Respiratory culture  11/5/21 pending   Pneumonia panel collected 10/27/2021 demonstrates staph aureus. MEC-A gene and adenovirus   CT chest on 11/1/2021 demonstrates bilateral pleural effusions with interval enlargement   CXR 11/3/2021 demonstrates worsening pulmonary congestion with bilateral effusions   UA shows bacteria w/ moderate white cells   CT Abdomen and pelvis 11/5/21 showed perinephric stranding with left-sided hypodense nodule with air-fluid level concerning for abscess. Diffuse ascites and anasarca noted.  CXR November 7, 2021 shows bilateral pulmonary edema with worsening right-sided infiltrate   CXR November 9, 2021 shows improvement in bilateral pulmonary edema with worsening left sided infiltrate    Repeat CXR 11/12/21 pending at time of evaluation.           Meets Continued ICU Level Care Criteria:    [x] Yes   [] No - Transfer Planned to listed location:  [] HOSPITALIST CONTACTED-    Ccm:32    Electronically signed by Leatha Angel MD  on 11/14/2021 at 1:17 PM

## 2021-11-15 ENCOUNTER — APPOINTMENT (OUTPATIENT)
Dept: GENERAL RADIOLOGY | Age: 71
DRG: 004 | End: 2021-11-15
Payer: MEDICARE

## 2021-11-15 LAB
ABO: NORMAL
ACINETOBACTER CALCOACETICUS-BAUMANNII BY PCR: NOT DETECTED
ADENOVIRUS BY PCR: NOT DETECTED
ALBUMIN SERPL-MCNC: 2.6 G/DL (ref 3.5–5.1)
ALBUMIN SERPL-MCNC: 2.7 G/DL (ref 3.5–5.1)
ALBUMIN SERPL-MCNC: 2.7 G/DL (ref 3.5–5.1)
ALP BLD-CCNC: 82 U/L (ref 38–126)
ALP BLD-CCNC: 86 U/L (ref 38–126)
ALP BLD-CCNC: 88 U/L (ref 38–126)
ALT SERPL-CCNC: 11 U/L (ref 11–66)
ALT SERPL-CCNC: 11 U/L (ref 11–66)
ALT SERPL-CCNC: 12 U/L (ref 11–66)
ANION GAP SERPL CALCULATED.3IONS-SCNC: 5 MEQ/L (ref 8–16)
ANION GAP SERPL CALCULATED.3IONS-SCNC: 6 MEQ/L (ref 8–16)
ANION GAP SERPL CALCULATED.3IONS-SCNC: 7 MEQ/L (ref 8–16)
ANTIBODY SCREEN: NORMAL
AST SERPL-CCNC: 10 U/L (ref 5–40)
AST SERPL-CCNC: 11 U/L (ref 5–40)
AST SERPL-CCNC: 12 U/L (ref 5–40)
BASE EXCESS MIXED: 2 MMOL/L (ref -2–3)
BILIRUB SERPL-MCNC: 0.5 MG/DL (ref 0.3–1.2)
BILIRUB SERPL-MCNC: 0.6 MG/DL (ref 0.3–1.2)
BILIRUB SERPL-MCNC: 1 MG/DL (ref 0.3–1.2)
BUN BLDV-MCNC: 14 MG/DL (ref 7–22)
BUN BLDV-MCNC: 14 MG/DL (ref 7–22)
BUN BLDV-MCNC: 16 MG/DL (ref 7–22)
CALCIUM IONIZED: 1.21 MMOL/L (ref 1.12–1.32)
CALCIUM IONIZED: 1.24 MMOL/L (ref 1.12–1.32)
CALCIUM IONIZED: 1.24 MMOL/L (ref 1.12–1.32)
CALCIUM SERPL-MCNC: 7.7 MG/DL (ref 8.5–10.5)
CALCIUM SERPL-MCNC: 8 MG/DL (ref 8.5–10.5)
CALCIUM SERPL-MCNC: 8.1 MG/DL (ref 8.5–10.5)
CHLAMYDIA PNEUMONIAE BY PCR: NOT DETECTED
CHLORIDE BLD-SCNC: 101 MEQ/L (ref 98–111)
CHLORIDE BLD-SCNC: 103 MEQ/L (ref 98–111)
CHLORIDE BLD-SCNC: 103 MEQ/L (ref 98–111)
CO2: 26 MEQ/L (ref 23–33)
CO2: 27 MEQ/L (ref 23–33)
CO2: 27 MEQ/L (ref 23–33)
COLLECTED BY:: ABNORMAL
CREAT SERPL-MCNC: 0.6 MG/DL (ref 0.4–1.2)
DEVICE: ABNORMAL
EKG ATRIAL RATE: 62 BPM
EKG P AXIS: 30 DEGREES
EKG P-R INTERVAL: 178 MS
EKG Q-T INTERVAL: 432 MS
EKG QRS DURATION: 92 MS
EKG QTC CALCULATION (BAZETT): 438 MS
EKG R AXIS: 66 DEGREES
EKG T AXIS: 29 DEGREES
EKG VENTRICULAR RATE: 62 BPM
ENTEROBACTER CLOACAE COMPLEX BY PCR: NOT DETECTED
ERYTHROCYTE [DISTWIDTH] IN BLOOD BY AUTOMATED COUNT: 17 % (ref 11.5–14.5)
ERYTHROCYTE [DISTWIDTH] IN BLOOD BY AUTOMATED COUNT: 18.9 % (ref 11.5–14.5)
ERYTHROCYTE [DISTWIDTH] IN BLOOD BY AUTOMATED COUNT: 19 % (ref 11.5–14.5)
ERYTHROCYTE [DISTWIDTH] IN BLOOD BY AUTOMATED COUNT: 56.3 FL (ref 35–45)
ERYTHROCYTE [DISTWIDTH] IN BLOOD BY AUTOMATED COUNT: 65.7 FL (ref 35–45)
ERYTHROCYTE [DISTWIDTH] IN BLOOD BY AUTOMATED COUNT: 66.1 FL (ref 35–45)
ESCHERICHIA COLI BY PCR: NOT DETECTED
FIO2, MIXED VENOUS: 80
GFR SERPL CREATININE-BSD FRML MDRD: > 90 ML/MIN/1.73M2
GLUCOSE BLD-MCNC: 105 MG/DL (ref 70–108)
GLUCOSE BLD-MCNC: 108 MG/DL (ref 70–108)
GLUCOSE BLD-MCNC: 122 MG/DL (ref 70–108)
GLUCOSE BLD-MCNC: 124 MG/DL (ref 70–108)
GLUCOSE BLD-MCNC: 124 MG/DL (ref 70–108)
GLUCOSE BLD-MCNC: 134 MG/DL (ref 70–108)
GLUCOSE BLD-MCNC: 138 MG/DL (ref 70–108)
GRAM STAIN RESULT: NORMAL
HAEMOPHILUS INFLUENZAE BY PCR: NOT DETECTED
HCO3, MIXED: 28 MMOL/L (ref 23–28)
HCT VFR BLD CALC: 22.8 % (ref 37–47)
HCT VFR BLD CALC: 23.9 % (ref 37–47)
HCT VFR BLD CALC: 32.7 % (ref 37–47)
HEMOGLOBIN: 10.6 GM/DL (ref 12–16)
HEMOGLOBIN: 6.7 GM/DL (ref 12–16)
HEMOGLOBIN: 7.2 GM/DL (ref 12–16)
INFLUENZA A BY PCR: NOT DETECTED
INFLUENZA B BY PCR: NOT DETECTED
KLEBSIELLA AEROGENES BY PCR: NOT DETECTED
KLEBSIELLA OXYTOCA BY PCR: NOT DETECTED
KLEBSIELLA PNEUMONIAE GROUP BY PCR: NOT DETECTED
LEGIONELLA PNEUMOPHILIA BY PCR: NOT DETECTED
MAGNESIUM: 2 MG/DL (ref 1.6–2.4)
MAGNESIUM: 2.2 MG/DL (ref 1.6–2.4)
MAGNESIUM: 2.3 MG/DL (ref 1.6–2.4)
MCH RBC QN AUTO: 28.8 PG (ref 26–33)
MCH RBC QN AUTO: 28.9 PG (ref 26–33)
MCH RBC QN AUTO: 29.9 PG (ref 26–33)
MCHC RBC AUTO-ENTMCNC: 29.4 GM/DL (ref 32.2–35.5)
MCHC RBC AUTO-ENTMCNC: 30.1 GM/DL (ref 32.2–35.5)
MCHC RBC AUTO-ENTMCNC: 32.4 GM/DL (ref 32.2–35.5)
MCV RBC AUTO: 92.4 FL (ref 81–99)
MCV RBC AUTO: 96 FL (ref 81–99)
MCV RBC AUTO: 97.9 FL (ref 81–99)
METAPNEUMOVIRUS BY PCR: NOT DETECTED
MORAXELLA CATARRHALIS BY PCR: NOT DETECTED
MYCOPLASMA PNEUMONIAE BY PCR: NOT DETECTED
NON-SARS CORONAVIRUS: NOT DETECTED
O2 SAT, MIXED: 67 %
PARAINFLUENZA VIRUS BY PCR: NOT DETECTED
PCO2, MIXED VENOUS: 54 MMHG (ref 41–51)
PH, MIXED: 7.33 (ref 7.31–7.41)
PHOSPHORUS: 1.8 MG/DL (ref 2.4–4.7)
PHOSPHORUS: 2.3 MG/DL (ref 2.4–4.7)
PHOSPHORUS: 2.3 MG/DL (ref 2.4–4.7)
PLATELET # BLD: 44 THOU/MM3 (ref 130–400)
PLATELET # BLD: 45 THOU/MM3 (ref 130–400)
PLATELET # BLD: 45 THOU/MM3 (ref 130–400)
PMV BLD AUTO: 10.7 FL (ref 9.4–12.4)
PMV BLD AUTO: 10.8 FL (ref 9.4–12.4)
PMV BLD AUTO: 9.5 FL (ref 9.4–12.4)
PO2 MIXED: 38 MMHG (ref 25–40)
POTASSIUM REFLEX MAGNESIUM: 4.1 MEQ/L (ref 3.5–5.2)
POTASSIUM REFLEX MAGNESIUM: 4.2 MEQ/L (ref 3.5–5.2)
POTASSIUM REFLEX MAGNESIUM: 4.4 MEQ/L (ref 3.5–5.2)
PROTEUS SPECIES BY PCR: NOT DETECTED
PSEUDOMONAS AERUGINOSA BY PCR: NOT DETECTED
RBC # BLD: 2.33 MILL/MM3 (ref 4.2–5.4)
RBC # BLD: 2.49 MILL/MM3 (ref 4.2–5.4)
RBC # BLD: 3.54 MILL/MM3 (ref 4.2–5.4)
RESISTANT GENE CTX-M BY PCR: NORMAL
RESISTANT GENE IMP BY PCR: NORMAL
RESISTANT GENE KPC BY PCR: NORMAL
RESISTANT GENE MECA/C & MREJ BY PCR: NORMAL
RESISTANT GENE NDM BY PCR: NORMAL
RESISTANT GENE OXA-48-LIKE BY PCR: NORMAL
RESISTANT GENE VIM BY PCR: NORMAL
RESPIRATORY CULTURE: NORMAL
RESPIRATORY SYNCYTIAL VIRUS BY PCR: NOT DETECTED
RH FACTOR: NORMAL
RHINOVIRUS ENTEROVIRUS PCR: NOT DETECTED
SERRATIA MARCESCENS BY PCR: NOT DETECTED
SET PEEP: 8 MMHG
SET PRESS SUPP: 8 CMH2O
SITE: ABNORMAL
SODIUM BLD-SCNC: 134 MEQ/L (ref 135–145)
SODIUM BLD-SCNC: 135 MEQ/L (ref 135–145)
SODIUM BLD-SCNC: 136 MEQ/L (ref 135–145)
SOURCE: NORMAL
SPECIMEN ACCEPTABILITY: NORMAL
STAPH AUREUS BY PCR: NOT DETECTED
STREP AGALACTIAE BY PCR: NOT DETECTED
STREP PNEUMONIAE BY PCR: NOT DETECTED
STREP PYOGENES BY PCR: NOT DETECTED
TOTAL PROTEIN: 4.3 G/DL (ref 6.1–8)
TOTAL PROTEIN: 4.6 G/DL (ref 6.1–8)
TOTAL PROTEIN: 4.8 G/DL (ref 6.1–8)
WBC # BLD: 14.7 THOU/MM3 (ref 4.8–10.8)
WBC # BLD: 17.5 THOU/MM3 (ref 4.8–10.8)
WBC # BLD: 19.8 THOU/MM3 (ref 4.8–10.8)

## 2021-11-15 PROCEDURE — 6360000002 HC RX W HCPCS: Performed by: INTERNAL MEDICINE

## 2021-11-15 PROCEDURE — 87631 RESP VIRUS 3-5 TARGETS: CPT

## 2021-11-15 PROCEDURE — C9113 INJ PANTOPRAZOLE SODIUM, VIA: HCPCS | Performed by: NURSE PRACTITIONER

## 2021-11-15 PROCEDURE — 2580000003 HC RX 258: Performed by: INTERNAL MEDICINE

## 2021-11-15 PROCEDURE — 85027 COMPLETE CBC AUTOMATED: CPT

## 2021-11-15 PROCEDURE — 6370000000 HC RX 637 (ALT 250 FOR IP): Performed by: STUDENT IN AN ORGANIZED HEALTH CARE EDUCATION/TRAINING PROGRAM

## 2021-11-15 PROCEDURE — 94002 VENT MGMT INPAT INIT DAY: CPT

## 2021-11-15 PROCEDURE — 90945 DIALYSIS ONE EVALUATION: CPT

## 2021-11-15 PROCEDURE — 87070 CULTURE OTHR SPECIMN AEROBIC: CPT

## 2021-11-15 PROCEDURE — 82948 REAGENT STRIP/BLOOD GLUCOSE: CPT

## 2021-11-15 PROCEDURE — 87798 DETECT AGENT NOS DNA AMP: CPT

## 2021-11-15 PROCEDURE — 93010 ELECTROCARDIOGRAM REPORT: CPT | Performed by: NUCLEAR MEDICINE

## 2021-11-15 PROCEDURE — 0BH18EZ INSERTION OF ENDOTRACHEAL AIRWAY INTO TRACHEA, VIA NATURAL OR ARTIFICIAL OPENING ENDOSCOPIC: ICD-10-PCS | Performed by: INTERNAL MEDICINE

## 2021-11-15 PROCEDURE — 2000000000 HC ICU R&B

## 2021-11-15 PROCEDURE — 87581 M.PNEUMON DNA AMP PROBE: CPT

## 2021-11-15 PROCEDURE — 6370000000 HC RX 637 (ALT 250 FOR IP): Performed by: INTERNAL MEDICINE

## 2021-11-15 PROCEDURE — 6360000002 HC RX W HCPCS: Performed by: NURSE PRACTITIONER

## 2021-11-15 PROCEDURE — 31500 INSERT EMERGENCY AIRWAY: CPT | Performed by: INTERNAL MEDICINE

## 2021-11-15 PROCEDURE — 6360000002 HC RX W HCPCS: Performed by: STUDENT IN AN ORGANIZED HEALTH CARE EDUCATION/TRAINING PROGRAM

## 2021-11-15 PROCEDURE — 36415 COLL VENOUS BLD VENIPUNCTURE: CPT

## 2021-11-15 PROCEDURE — 86922 COMPATIBILITY TEST ANTIGLOB: CPT

## 2021-11-15 PROCEDURE — 5A1955Z RESPIRATORY VENTILATION, GREATER THAN 96 CONSECUTIVE HOURS: ICD-10-PCS | Performed by: INTERNAL MEDICINE

## 2021-11-15 PROCEDURE — 2580000003 HC RX 258: Performed by: STUDENT IN AN ORGANIZED HEALTH CARE EDUCATION/TRAINING PROGRAM

## 2021-11-15 PROCEDURE — 86850 RBC ANTIBODY SCREEN: CPT

## 2021-11-15 PROCEDURE — 89220 SPUTUM SPECIMEN COLLECTION: CPT

## 2021-11-15 PROCEDURE — 99232 SBSQ HOSP IP/OBS MODERATE 35: CPT | Performed by: UROLOGY

## 2021-11-15 PROCEDURE — 87541 LEGION PNEUMO DNA AMP PROB: CPT

## 2021-11-15 PROCEDURE — 86900 BLOOD TYPING SEROLOGIC ABO: CPT

## 2021-11-15 PROCEDURE — 82803 BLOOD GASES ANY COMBINATION: CPT

## 2021-11-15 PROCEDURE — 87205 SMEAR GRAM STAIN: CPT

## 2021-11-15 PROCEDURE — 87486 CHLMYD PNEUM DNA AMP PROBE: CPT

## 2021-11-15 PROCEDURE — 83735 ASSAY OF MAGNESIUM: CPT

## 2021-11-15 PROCEDURE — 99233 SBSQ HOSP IP/OBS HIGH 50: CPT | Performed by: INTERNAL MEDICINE

## 2021-11-15 PROCEDURE — 2500000003 HC RX 250 WO HCPCS: Performed by: STUDENT IN AN ORGANIZED HEALTH CARE EDUCATION/TRAINING PROGRAM

## 2021-11-15 PROCEDURE — 2500000003 HC RX 250 WO HCPCS: Performed by: INTERNAL MEDICINE

## 2021-11-15 PROCEDURE — 71045 X-RAY EXAM CHEST 1 VIEW: CPT

## 2021-11-15 PROCEDURE — 86901 BLOOD TYPING SEROLOGIC RH(D): CPT

## 2021-11-15 PROCEDURE — 84100 ASSAY OF PHOSPHORUS: CPT

## 2021-11-15 PROCEDURE — 94640 AIRWAY INHALATION TREATMENT: CPT

## 2021-11-15 PROCEDURE — P9016 RBC LEUKOCYTES REDUCED: HCPCS

## 2021-11-15 PROCEDURE — 2580000003 HC RX 258: Performed by: EMERGENCY MEDICINE

## 2021-11-15 PROCEDURE — 99291 CRITICAL CARE FIRST HOUR: CPT | Performed by: INTERNAL MEDICINE

## 2021-11-15 PROCEDURE — 82330 ASSAY OF CALCIUM: CPT

## 2021-11-15 PROCEDURE — 94660 CPAP INITIATION&MGMT: CPT

## 2021-11-15 PROCEDURE — 80053 COMPREHEN METABOLIC PANEL: CPT

## 2021-11-15 PROCEDURE — 93005 ELECTROCARDIOGRAM TRACING: CPT | Performed by: STUDENT IN AN ORGANIZED HEALTH CARE EDUCATION/TRAINING PROGRAM

## 2021-11-15 RX ORDER — SODIUM CHLORIDE 0.9 % (FLUSH) 0.9 %
5-40 SYRINGE (ML) INJECTION PRN
Status: DISCONTINUED | OUTPATIENT
Start: 2021-11-15 | End: 2021-11-15 | Stop reason: SDUPTHER

## 2021-11-15 RX ORDER — PROPOFOL 10 MG/ML
5-50 INJECTION, EMULSION INTRAVENOUS
Status: DISCONTINUED | OUTPATIENT
Start: 2021-11-15 | End: 2021-11-17

## 2021-11-15 RX ORDER — FENTANYL CITRATE 50 UG/ML
50 INJECTION, SOLUTION INTRAMUSCULAR; INTRAVENOUS ONCE
Status: COMPLETED | OUTPATIENT
Start: 2021-11-15 | End: 2021-11-15

## 2021-11-15 RX ORDER — SODIUM CHLORIDE 0.9 % (FLUSH) 0.9 %
5-40 SYRINGE (ML) INJECTION EVERY 12 HOURS SCHEDULED
Status: DISCONTINUED | OUTPATIENT
Start: 2021-11-15 | End: 2021-11-15 | Stop reason: SDUPTHER

## 2021-11-15 RX ORDER — SODIUM CHLORIDE 9 MG/ML
INJECTION, SOLUTION INTRAVENOUS PRN
Status: DISCONTINUED | OUTPATIENT
Start: 2021-11-15 | End: 2021-11-19 | Stop reason: SDUPTHER

## 2021-11-15 RX ORDER — SODIUM CHLORIDE 9 MG/ML
25 INJECTION, SOLUTION INTRAVENOUS PRN
Status: DISCONTINUED | OUTPATIENT
Start: 2021-11-15 | End: 2021-11-15 | Stop reason: SDUPTHER

## 2021-11-15 RX ORDER — LIDOCAINE HYDROCHLORIDE 10 MG/ML
5 INJECTION, SOLUTION EPIDURAL; INFILTRATION; INTRACAUDAL; PERINEURAL ONCE
Status: DISCONTINUED | OUTPATIENT
Start: 2021-11-15 | End: 2021-11-26

## 2021-11-15 RX ORDER — KETAMINE HCL IN NACL, ISO-OSM 100MG/10ML
100 SYRINGE (ML) INJECTION ONCE
Status: DISCONTINUED | OUTPATIENT
Start: 2021-11-15 | End: 2021-11-26

## 2021-11-15 RX ADMIN — FENTANYL CITRATE 25 MCG: 0.05 INJECTION, SOLUTION INTRAMUSCULAR; INTRAVENOUS at 04:18

## 2021-11-15 RX ADMIN — SODIUM CHLORIDE 1.4 MCG/KG/HR: 9 INJECTION, SOLUTION INTRAVENOUS at 15:51

## 2021-11-15 RX ADMIN — ALLOPURINOL 100 MG: 100 TABLET ORAL at 08:20

## 2021-11-15 RX ADMIN — ALBUTEROL SULFATE 2.5 MG: 2.5 SOLUTION RESPIRATORY (INHALATION) at 05:31

## 2021-11-15 RX ADMIN — SODIUM CHLORIDE SOLN NEBU 3% 4 ML: 3 NEBU SOLN at 17:09

## 2021-11-15 RX ADMIN — FENTANYL CITRATE 50 MCG: 50 INJECTION, SOLUTION INTRAMUSCULAR; INTRAVENOUS at 13:00

## 2021-11-15 RX ADMIN — POTASSIUM & SODIUM PHOSPHATES POWDER PACK 280-160-250 MG 500 MG: 280-160-250 PACK at 12:28

## 2021-11-15 RX ADMIN — CLINDAMYCIN PHOSPHATE 600 MG: 600 INJECTION, SOLUTION INTRAVENOUS at 08:15

## 2021-11-15 RX ADMIN — SODIUM HYPOCHLORITE: 1.25 SOLUTION TOPICAL at 08:22

## 2021-11-15 RX ADMIN — POTASSIUM PHOSPHATE, MONOBASIC AND POTASSIUM PHOSPHATE, DIBASIC 12 MMOL: 224; 236 INJECTION, SOLUTION, CONCENTRATE INTRAVENOUS at 22:56

## 2021-11-15 RX ADMIN — TIOTROPIUM BROMIDE AND OLODATEROL 2 PUFF: 3.124; 2.736 SPRAY, METERED RESPIRATORY (INHALATION) at 09:12

## 2021-11-15 RX ADMIN — Medication: at 13:05

## 2021-11-15 RX ADMIN — CLINDAMYCIN PHOSPHATE 600 MG: 600 INJECTION, SOLUTION INTRAVENOUS at 15:57

## 2021-11-15 RX ADMIN — SODIUM CHLORIDE 1.4 MCG/KG/HR: 9 INJECTION, SOLUTION INTRAVENOUS at 12:21

## 2021-11-15 RX ADMIN — MIDODRINE HYDROCHLORIDE 10 MG: 10 TABLET ORAL at 23:20

## 2021-11-15 RX ADMIN — PANTOPRAZOLE SODIUM 40 MG: 40 INJECTION, POWDER, FOR SOLUTION INTRAVENOUS at 08:30

## 2021-11-15 RX ADMIN — POTASSIUM & SODIUM PHOSPHATES POWDER PACK 280-160-250 MG 500 MG: 280-160-250 PACK at 01:13

## 2021-11-15 RX ADMIN — Medication 25 MCG/HR: at 12:22

## 2021-11-15 RX ADMIN — MIDODRINE HYDROCHLORIDE 10 MG: 10 TABLET ORAL at 12:28

## 2021-11-15 RX ADMIN — Medication: at 13:09

## 2021-11-15 RX ADMIN — SODIUM CHLORIDE 25 ML: 9 INJECTION, SOLUTION INTRAVENOUS at 22:39

## 2021-11-15 RX ADMIN — SODIUM CHLORIDE 1.3 MCG/KG/HR: 9 INJECTION, SOLUTION INTRAVENOUS at 23:29

## 2021-11-15 RX ADMIN — MIDODRINE HYDROCHLORIDE 10 MG: 10 TABLET ORAL at 08:21

## 2021-11-15 RX ADMIN — SODIUM CHLORIDE, PRESERVATIVE FREE 10 ML: 5 INJECTION INTRAVENOUS at 08:21

## 2021-11-15 RX ADMIN — ALBUTEROL SULFATE 2.5 MG: 2.5 SOLUTION RESPIRATORY (INHALATION) at 12:43

## 2021-11-15 RX ADMIN — PHENYLEPHRINE HYDROCHLORIDE 150 MCG/MIN: 10 INJECTION INTRAVENOUS at 11:53

## 2021-11-15 RX ADMIN — SODIUM CHLORIDE SOLN NEBU 3% 4 ML: 3 NEBU SOLN at 05:31

## 2021-11-15 RX ADMIN — CLINDAMYCIN PHOSPHATE 600 MG: 600 INJECTION, SOLUTION INTRAVENOUS at 22:41

## 2021-11-15 RX ADMIN — PHENYLEPHRINE HYDROCHLORIDE 100 MCG/MIN: 10 INJECTION INTRAVENOUS at 05:11

## 2021-11-15 RX ADMIN — SODIUM CHLORIDE 0.4 MCG/KG/HR: 9 INJECTION, SOLUTION INTRAVENOUS at 04:18

## 2021-11-15 RX ADMIN — ALBUTEROL SULFATE 2.5 MG: 2.5 SOLUTION RESPIRATORY (INHALATION) at 17:10

## 2021-11-15 RX ADMIN — SODIUM CHLORIDE 1.3 MCG/KG/HR: 9 INJECTION, SOLUTION INTRAVENOUS at 19:48

## 2021-11-15 ASSESSMENT — PULMONARY FUNCTION TESTS
PIF_VALUE: 30
PIF_VALUE: 30
PIF_VALUE: 29

## 2021-11-15 ASSESSMENT — PAIN SCALES - GENERAL: PAINLEVEL_OUTOF10: 0

## 2021-11-15 NOTE — PROGRESS NOTES
[facility] 62  PHYSICAL THERAPY MISSED TREATMENT NOTE  [facility] ICU 4D    Date: 11/15/2021  Patient Name: Velvet Massey        MRN: [id number]   : 1950  (77 y.o.)  Gender: female                REASON FOR MISSED TREATMENT:  Hold treatment per nursing request.      Pt going to be intubated. Will hold and attempt back as patient becomes appropriate.      Lesley Hicks PT, DPT

## 2021-11-15 NOTE — PROGRESS NOTES
[facility] THERAPY MISSED TREATMENT NOTE  [facility] ICU 4D      Date: 11/15/2021  Patient Name: Eulalio Maria        MRN: [id number]    : 1950  (73 y.o.)    REASON FOR MISSED TREATMENT:  Attempted to see patient at [phone] for completion of dysphagia interventions. Per ICU collaborative rounds, patient with decline in respiratory status with needs for re-intubation. Will closely monitor medical+respiratory status, completing repeat clinical swallow evaluation as it becomes clinically indicated.        Judi Jamil M.A., [address] [place] [address]

## 2021-11-15 NOTE — PROGRESS NOTES
CRITICAL CARE PROGRESS NOTE      Patient:  Tye [facility]    Unit/Bed:4D-09/009-A  YOB: 1950  MRN: [id number]   PCP: Huey Chatman MD  Date of Admission: 10/27/2021  Chief Complaint:- Shortness of breath    Assessment and Plan:    Acute combined hypercapnic and hypoxic respiratory failure: Secondary to severe pneumonia with left-sided pleural effusion and repeated mucous plugging. Intubated 10/27/21. Extubated following successful SBT on 11/9/21. Chest x-ray today shows combination of left lung atelectasis most likely secondary to mucous plugging in addition to pleural effusion, she is a status post bronchoscopy with mucous plug removal from the left mainstem on 11/13. Failed HFNC and was reintubated on 11/15/21. Family consented for tracheostomy at that time. Severe bilateral multiorganism pneumonia: PCR positive Staph w/ MECA gene and adenovirus consistent w/ MRSA colonization. Culture growing MSSA. Repeat culture 11/5/21 shows coag positive staph completed 14 days Vancomycin (started 10/28/21) and completed 5 days Rocephin Clindamycin started for coverage of PVL as patient clinically worsening following 14 days Vancomycin for MRSA coverage. Underwent bronchoscopy for left mainstem mucus plug noted CXR on 11/11/21. Day 5/8 clindamycin . follow-up cultures from the bronchial washing that was obtained on 11/13. Septic Shock: 2/2 severe PNA. CI 6.3 NE d/c 2/2 worsening afib RVR. NE d/c on 11/3/21 2/2 worsening afib/RVR. She was on pressor support w/ phenylephrine. She is back on pressors. Stage II ALVIN on CKD 3: Suspect post renal etiology 2/2 staghorn calculi. Nephrology following. CRRT initiated 11/5/21 will continue for UF removal that was continued until 11/12, the patient is off CRRT. 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 ANCA associated Abs show elevated anti MPO elevated 416 and serine proteinase 3 IgG WNL. Will need Bx for confirmation.  Mitochondrial Abs pending, Anti-DS-DNA negative, Anti histone negative, Anti Smith,and  Anti-Savana WNL,  GBM antibodies negative, C3/C4 levels normal, elevated kappa/lambda free light chains with normal ratio. No plans per Bx per nephrology. Fluid collection noted on left renal unlikely abscess as patient remains afebrile. 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 Will need Renal Bx for confirmation once stable. Discussed case w/ Nephrology. Completed pulse dose steroids starting on 11/8/21 w/  10mg/kg methylprednisolone for 3 days. SSI started for coverage. Not candidate for Rituxin/TPE 2/2 existing infection. No diffuse alveolar hemorrhage. Hydronephrosis 2/2 Left-sided staghorn calculi: Urology following. Percutaneous drainage tube 11/2/21 IR. Low output following tube placement. Increased flow on CBI per urology recs. Still flushing clots. Perinephric stranding noted on CT abdomen and pelvis w/ air fluid region discontiguous with region of nephrostomy tube insertion raised initial concern for abscess (<3 cm) vs emphysematous pyelonephritis when discussed with radiologist. Received 5 days empiric therapy w/ cefepime (started 11/5/21). On Vancomycin per above. Culture of nephrostomy tube aspirate from 11/5/21 shows staph epidermidis (vancomycin sensitive). Acute blood loss anemia: Suspect consumptive process. Leading differential , smear, reticulocytes, LDH, Haptoglobin, MERECDES, B12/folate  pendingsuspect 2/2 hemolysis on CRRT. PRBC x1 transfused 10/31/21, 11/4/21, 11/5/21, x2 11/6/21, x3 11/8/21. Heparin stopped (dialysis dose still running). Holding ASA. Thrombocytopenia: suspect consumptive process. Workup per above. Not heparin per above. Hematuria: s/p perc tube placement. CBI per above. Stopped heparin, holding ASA. Suspected Dysphagia: 2/2 prolonged intubation. SLP eval pending  Bilateral Pleural Effusion:   Associated volume overload in context of renal failure.  Interval enlargement noted on CT 11/1/21. Currently on CVVH for effusions. Plan for chest tube insertion if no improvement  Suspected COPD: current every day smoker. Obstructive process noted on flow volume loop on ventilator. Started empiric therapy with Stiolto. Recommend formal PFT once improved  PAH/chronic RV failure NYHA II: EF 55 to 60% G2 DD TTE 5/4/2021. RHC showed [place] 30 with elevated PCWP. Treated w/ Riociguat. Normocytic Anemia: 2/2 hematuria s/p perc tube placement w/ Iron studies consistent w/  MERCEDES vs Anemia chronic disease. B12 WNL, Folate low, Absreticulocyte index 1.9% Soluable transferrin pending. Calculated Iron deficit 827mg. Will start Venofer replace folate once ABx stopped  Cholelithiasis: w/ dilated CBD. Noted on 10/30/2021 US liver/GB. GI following. Does not suspect choledocolithiasis at this time. May consider HIDA scan once stable   Leukocytosis: 2/2 to pulse dose steroids per above  Stage III decubitus ulcer with inferior tunneling: S/p excision VAC placement 8/21. Low suspicion for active infection  Moderate malnutrition: Pre-albumin 14.4 w/ notable cachexia  Chronic tobacco use:   cessation  Mild AS: Noted on previous TTE not appreciated on repeat imaging  Gout:  W/o exacerbation. Continue allopurinol  BARBER: non compliant w/ CPAP  New onset atrial fibrillation with rapid ventricular response (resolved): now in NSR s/p DCCV 11/3/21 Stopped Heparin per hematuria w/ anemia requiring transfusion & and now in NSR. Stopped Amiodarone 11/8/21. Holding metoprolol 2/2 hypotension  Hyperkalemia (resolved): 2/2 ALVIN  Hyponatremia (resolved): Suspect 2/2 renal failure. Nutrition: on TF @30 ml       INITIAL H AND P AND ICU COURSE:  71F admitted to [address] [place] [address] 10/27/21 w/ SOB. Current smoker w/ PMH PAH grp 3, HFpEF, stage III sacral ulcer s/p wound ostomy 9/21. Patient sister reports SpO2 51% at home and was brought to ED where ABG showed pH 7.19, PCO2 80, PO2 134. She required emergent intubation and was transferred to ICU.  Workup revealed left sided pleural effusion and underwent US guided thoracentesis w/ 1100 cc removed consistent w/ MRSA/adenovirus. Patient was noted to be in afib/RVR and was placed on amio, heparin drip. Patient was also noted to have normocytic anemia and received 1 unit PRBC 10/31/21. CT abdomen revealed CBD dilation w/ cholelithiasis. 11/1/2021: Plan for nephrostomy tubes today. Hemoglobin 7.7 s/p 1 unit PRBC yesterday. Weaned to 4 mics Levophed. 11/2/2021: Patient resumed back on prior dose of vancomycin. Low urine output w/ increasing pressor requirements today    11/3/2021: Will attempt SBT if able to wean today. Increased Amio drip 2/2 worsening tachycardia. Continued hematuria w/ low urine output. Cr stable. Will evaluate UTI following perc tube placement. Rise in WBC noted. Culture pending    11/4/2021: Cardioversion on 11/3/2021. Now and NSR. CVP 29 this a.m. Suspect drop in CI 2/2 to stopping levophed. Diffuse edema following transfusion overnight. Given one-time dose of 2 mg Bumex and albumin. SBT this AM    11/5/2021: Failed SBT yesterday. Continued low urine output. Trial of Bumex today. Will proceed with dialysis if fails to improve. 11/6/2021: Started on CRRT, continues to have bloody drainage of nephrostomy tube given 1 unit PRBC, cefipime emperically pending cultures of nephrostomy tube fluid and repeat resp cultures. 11/7/21: Patient remains clinically well on exam.  We'll continue medical of UF with CRRT. On empiric cefepime for coverage of perinephric abscess noted on CT abdomen and pelvis November 4, 2021. White count trending down. Will attempt spontaneous breathing trial in a.m.    11/8/21: Transfused 1x PRBC this AM. Anemia workup pending. WBC trending down. Patient ventilator settings this AM preclude SBT. Volume overloaded on exam. UF increased to 125 cc/hr this AM. Will attempt to wean vent settings further further. Coag neg staph growing on nephrostomy tube aspirate. Suspect contamination. Continue existing ABx    11/9/21: Pulse dose steroids initiated overnight. Started on SSI. Urine output now improving 75cc/hr. Transfused 3 units PRBCs yesterday for Improve DO2. MERCEDES noted w/ 875 mg deficit. Will replace once steroids/ABx completed. 11/10/21 Extubated overnight. SLP eval today. Continue BiPAP while asleep w/ transition to NC while awake as tolerated. No tachypnea. Day 3 pulse dose steroids. Continued bloody drainage from nephrostomy tube. [facility] [place]     11/11/21 Worsening bilateral infiltrates with SpO2 <90% on HFNC. Continued pulmonary hygiene w/ nebulized hypertonic saline, acapella and breathing treatments in addition to deep suctioning. Continued on Vancomycin for MRSA PNA. Plan for family meeting this AM to discuss goals of care. 11/12/21 Follow-up discussion from family meeting. Patient nodded agreement with full code status including reintubation leading to tracheostomy and PEG placement and continued hemodialysis if needed. Patient nodded agreement and understanding with these decisions. CXR yesterday evening did show left mainstem mucus plugging with cutoff sign and absent breath sounds and did undergo emergent bronchoscopy overnight on 11/12/21. Diminished breath sounds and worsening respiratory status this AM concerning for continued plugging. Stat chest X-ray ordered for confirmation. 11/13/21: Respiratory status continues to decline, she is requiring BiPAP with FiO2 of 70%. Chest x-ray shows complete opacification of the left lung secondary to mucous plugging, also the patient has left-sided pleural effusion. Plan for bronchoscopy. 11/15/2021: Family meeting today per event note. Reintubated today after failed BiPAP    Past Medical History:  Per HPI. Family History:  DM in father and sister. Social History: chronic smoker.     ROS   Patient reports fatigue,  A 12 point review of systems was otherwise negative    Scheduled Meds:   potassium & sodium phosphates  2 packet Oral Once    allopurinol  100 mg Oral Daily    midodrine  10 mg Oral Q8H    clindamycin (CLEOCIN) IV  600 mg IntraVENous Q8H    sodium chloride (Inhalant)  4 mL Nebulization 4 times per day    And    albuterol  2.5 mg Nebulization 4 times per day    insulin lispro  0-6 Units SubCUTAneous Q4H    phosphorus replacement protocol   Other RX Placeholder    potassium bicarb-citric acid  40 mEq Oral Once    tiotropium-olodaterol  2 puff Inhalation Daily    [Held by provider] aspirin  81 mg Oral Daily    pantoprazole  40 mg IntraVENous QAM    sodium hypochlorite   Irrigation Daily    Riociguat  2.5 mg Oral Q8H    sodium chloride flush  5-40 mL IntraVENous 2 times per day    [Held by provider] FLUoxetine  40 mg Oral Daily    [Held by provider] metoprolol tartrate  12.5 mg Oral BID     Continuous Infusions:   sodium chloride      midazolam      fentaNYL 500 mcg in sodium chloride 0.9% 100 ml infusion      prismaSATE BGK 4/2.5 500 mL/hr at 11/15/21 1045    prismaSol BGK 4/2.5 500 mL/hr at 11/15/21 1045    prismaSol BGK 4/2.5 500 mL/hr at 11/15/21 1045    phenylephrine (KRISTAL-SYNEPHRINE) 50mg/250mL infusion 150 mcg/min (11/15/21 1058)    dextrose      dexmedetomidine 0.5 mcg/kg/hr (11/15/21 1058)    bumetanide 0.1 mg/mL infusion 0.5 mg/hr (11/05/21 0904)    sodium chloride Stopped (11/13/21 1445)    [Held by provider] sodium chloride 50 mL/hr at 10/28/21 2314       PHYSICAL EXAMINATION:  T:  98.2. P: 73 RR:  22. B/P: 104/47 FiO2: 65. O2 Sat:  95%. I/O: 656/2530 net -1873  Body mass index is 35.4 kg/m². GCS:   8  PC: 12 PEEP: 6: TV: 380: RRTotal: 18: General: Acute on chronically ill-appearing elderly white female  HEENT: Temporal wasting appreciated. Normocephalic and atraumatic. No scleral icterus. PERR  Neck: supple. No Thyromegaly. Left subclavian dialysis catheter   Lungs: Diffuse rhonchi appreciated. Absent left lower lobe breath sounds. No retractions  Cardiac: RRR. No JVD.   Abdomen: soft.  Nontender. ,npehrostomy tube with bloody drainage  Extremities:  +1 pitting edema x4. (interval improvement noted) No clubbing, cyanosis   Vasculature: capillary refill < 3 seconds. Palpable dorsalis pedis pulses. Skin:  warm and dry. Psych: Alert and oriented to person place and time, affect appropriate  Lymph:  No supraclavicular adenopathy. Neurologic:  No focal deficit. No seizures. Data: (All radiographs, tracings, PFTs, and imaging are personally viewed and interpreted unless otherwise noted). CMP: Sodium 135 potassium 4.2 chloride 103 BUN/creatinine 14/0.6 anion gap 5 ionized calcium 1.24 magnesium 2.0 glucose 124 calcium 7.7 phosphorus 2.3 total protein 4.3 albumin 2.6 alk phos 82 ALT/AST 11/10 total protein 4.3 WBC 17.5 H&H 6.7/22.8 platelets 44   CBC WBC 17.5 H&H 6.7/22.8 platelets 44  SARS-CoV-2 NAAT negative on 10/27/2021  Telemetry shows NSR  Chest x-ray 10/31/2021 demonstrated diffuse bilateral patchy opacities with left-sided pleural effusion and cardiomegaly  Respiratory culture  11/5/21 pending  Pneumonia panel collected 10/27/2021 demonstrates staph aureus. MEC-A gene and adenovirus  CT chest on 11/1/2021 demonstrates bilateral pleural effusions with interval enlargement  CXR 11/3/2021 demonstrates worsening pulmonary congestion with bilateral effusions  UA shows bacteria w/ moderate white cells  CT Abdomen and pelvis 11/5/21 showed perinephric stranding with left-sided hypodense nodule with air-fluid level concerning for abscess. Diffuse ascites and anasarca noted. CXR November 7, 2021 shows bilateral pulmonary edema with worsening right-sided infiltrate  CXR November 9, 2021 shows improvement in bilateral pulmonary edema with worsening left sided infiltrate   Repeat CXR 11/12/21 pending at time of evaluation.    Chest x-ray on 11/15/2021 demonstrates persistent bilateral pleural effusions and slight improvement in interstitial disease noted bilaterally         Meets Continued ICU Level Care Criteria:    [x] Yes   [] No - Transfer Planned to listed location:  [] HOSPITALIST CONTACTED- DR       Electronically signed by Estela Gibbons DO PGY-2 on 11/15/2021 at 11:34 AM    Patient seen by me. Case discussed with does not physician. Patient remains persistently critically ill. .  Italicized font represents changes to the note made by me. CC time 35 minutes. Time was discontiguous. Time does not include procedures. Time does include my direct assessment of the patient and coordination of care.   Electronically signed by Argentina Izaguirre MD on 11/15/2021 at 7:03 PM

## 2021-11-15 NOTE — PROGRESS NOTES
Patient on 100% FiO2 on BiPAP  On CVVH. Left nephrostomy tube flushing well. CBI off, urine is clear yellow  Family at bedside. Will most likely be re intubated today    Vitals:    11/15/21 0920 11/15/21 0930 11/15/21 1000 11/15/21 1100   BP:  (!) 116/45 (!) 118/49 (!) 115/48   Pulse:  68 69 66   Resp: 22 20 24 19   Temp:       TempSrc:       SpO2: 100% 100% 100% 100%   Weight:       Height:             Intake/Output Summary (Last 24 hours) at 11/15/2021 1207  Last data filed at 11/15/2021 1100  Gross per 24 hour   Intake 1950.71 ml   Output 5813 ml   Net -3860.29 ml       Labs  Recent Labs     11/14/21  2115 11/15/21  0320 11/15/21  0836   WBC 22.4* 19.8* 17.5*   HGB 7.5* 7.2* 6.7*   HCT 24.8* 23.9* 22.8*   PLT 46* 45* 44*    136 135   K 4.1 4.1 4.2    103 103   CO2 27 27 27   BUN 20 16 14   CREATININE 0.7 0.6 0.6   MG 2.3 2.2 2.0   PHOS 2.3* 2.3* 2.3*   CALCIUM 7.9* 8.0* 7.7*   Urine culture: No growth (11/2 and 10/27)  Left nephrostomy tube culture: Staph epidermis, MRSE. Very light growth. Exam  General: Extubated, asleep. Lungs: Diminished bilaterally. No r/r/w. Heart: RRR. S1/S2. Abdomen: Soft. Distended. Left nephrostomy tube with bloody drainage. Hypoactive bowel sounds. :  [place] with light yellow urine. Extremities: UE and LE with 2+ pitting edema bilaterally      Assessment and Plan    1. ALVIN- Multifactorial (sepsis, left hydronephrosis, contrast). Nephrology consulted. Started CVVH on 11/5/21.     2. Gross Hematuria- resolved, off CBI Urine has remained clear. Call urology with any return of hematuria.     3. Pyelonephritis- Perinephric stranding on CT abdomen and pelvis with a small area concerning for developing abscess in the region of the left nephrostomy tube. Original and subsequent CT on 11/9/21 are stable. Will continue to follow peripherally. WBC 17.5, afebrile.     4. Left Staghorn Calculus- Nephrostomy tube place 11/2/21. Will need treated when patient stable.  Tube flushes without problems.     5. Acute Blood Loss Anemia- Has received multiple transfusions. Heparin stopped. Unit pending today.     6. Respiratory Failure secondary to Pneumonia- on bipap. On Clindamycin. Completed Rocephin.     7. 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 8. Left Main Stem Clot- Status post bronchoscopy (11/11/21, and repeat bronch 11-). Urology will continue to follow peripherally.   Contact us if hematuria returns    Electronically signed by LANE Vega CNP on 11/15/2021 at 12:07 PM

## 2021-11-15 NOTE — PROCEDURES
ICU PROCEDURE - ENDOTRACHEAL INTUBATION    Alireza Cueva     MRN#: [id number]  11/15/21      Coral [email or web address]     : 1950      INDICATION: Respiratory fatigue, hypoxia    TIME OUT: taken    Permission obtained, risks/benefits reviewed:    ANESTHESIA:   []Ketamine  []Ativan  [] Morphine  [x]Propofol  [x]Other medications: Versed      ESTIMATED BLOOD LOSS:  None. COMPLICATIONS:  [x]N/A  [] Other:    LARYNGOSCOPIC AIRWAY GRADE (CORMACK-LEHANE):[x]1  []2a  []2b []3  []4        INTUBATION EQUIPMENT USED:  [x] Direct laryngoscope only    OUTCOME: Successful placement of #  7.5 Taperguard Evac endotracheal via   [x]Oral route    INSERTION DEPTH: 21 cm from   [x]lip           CONFIRMATION OF TUBE POSITION:   [x]Capnography - Strong & repeatable exhaled CO2 detection   [x]Multiple point auscultation   [x]SpO2 response   [x]STAT X-ray   []Bronchoscopic assessment    UNUSUAL FINDINGS:    PROCEDURE:     Using direct laryngoscopy, the vocal cords were visualized and the endotracheal tube was placed through the cords under direct vision. Good breath sounds were auscultated bilaterally without sounds over abdomen. Appropriate strong & repeatable exhaled CO2 detection was confirmed.      I was supervised by Dr Queta Gomez who was at bedside during the procedure    Electronically signed by Aristides Valentine DO PGY-2 on 11/15/2021 at 12:09 PM

## 2021-11-15 NOTE — SIGNIFICANT EVENT
Family meeting at bedside today to discuss reintubation following the patient's declining respiratory status over the weekend. Patient now requiring 100% FiO2 on BiPAP. When addressed this morning patient reports that she is tired and has been reduced to shaking or nodding her head in response to questions. Notably tachypneic this morning. Discussed concern for worsening mucous plugging on BiPAP as noted on the prior chest x-ray. We discussed that reintubation will need to transition to tracheostomy and the patient may be a tracheostomy and ventilator dependent for the rest of her life. Discussed that patient will likely need PEG tube placement. Further discussed the increasing pressor requirement to infection, malnutrition and failure to thrive. Discussed failure to wean off of dialysis. Discussed underlying ANCA associated vasculitis as a unifying diagnosis for patient's ongoing renal and respiratory status. At this time patient states that she would like to keep on living will proceed with aggressive therapy which will include intubation and tracheostomy. Verbal consent including the risks benefits and alternatives were discussed with the family during family meeting. To which the family assented and the patient nodded her head in agreement.

## 2021-11-15 NOTE — PROGRESS NOTES
[facility]         Renal Inpatient Follow-Up note         11/15/2021 1:31 PM    Pt Name:   Natasha Song:   1950  Attending:   Zhang Keys MD    Chief Complaint : Mohini Abad is a 73 y.o. female being followed by nephrology for ALVIN/CKD    Interval History :   Patient seen and examined by me. No distress  Extubated and somewhat confused cannot assess history or review of systems. On BiPAP. CVVH running well with 150 mL of ultrafiltration per hour.   Restarted over the weekend  Still on Benjamin-Synephrine, dose has rising     Scheduled Medications :    ketamine  100 mg IntraVENous Once    allopurinol  100 mg Oral Daily    midodrine  10 mg Oral Q8H    clindamycin (CLEOCIN) IV  600 mg IntraVENous Q8H    sodium chloride (Inhalant)  4 mL Nebulization 4 times per day    And    albuterol  2.5 mg Nebulization 4 times per day    insulin lispro  0-6 Units SubCUTAneous Q4H    phosphorus replacement protocol   Other RX Placeholder    potassium bicarb-citric acid  40 mEq Oral Once    tiotropium-olodaterol  2 puff Inhalation Daily    [Held by provider] aspirin  81 mg Oral Daily    pantoprazole  40 mg IntraVENous QAM    sodium hypochlorite   Irrigation Daily    Riociguat  2.5 mg Oral Q8H    sodium chloride flush  5-40 mL IntraVENous 2 times per day    [Held by provider] FLUoxetine  40 mg Oral Daily    [Held by provider] metoprolol tartrate  12.5 mg Oral BID      sodium chloride      midazolam      fentaNYL 500 mcg in sodium chloride 0.9% 100 ml infusion 25 mcg/hr (11/15/21 1254)    propofol      prismaSATE BGK 4/2.5 500 mL/hr at 11/15/21 1305    prismaSol BGK 4/2.5 500 mL/hr at 11/15/21 1305    prismaSol BGK 4/2.5 500 mL/hr at 11/15/21 1309    phenylephrine (BENJAMIN-SYNEPHRINE) 50mg/250mL infusion 125 mcg/min (11/15/21 1254)    dextrose      dexmedetomidine 1.4 mcg/kg/hr (11/15/21 1254)    bumetanide 0.1 mg/mL infusion 0.5 mg/hr (11/05/21 0904)    sodium chloride Stopped (11/13/21 1445)    [Held by provider] sodium chloride 50 mL/hr at 10/28/21 2314       Vitals :  BP (!) 155/59   Pulse 66   Temp 98.7 °F (37.1 °C) (Axillary)   Resp 16   Ht 4' 9\" (1.448 m)   Wt 163 lb 9.3 oz (74.2 kg)   SpO2 100%   BMI 35.40 kg/m²     24HR INTAKE/OUTPUT:      Intake/Output Summary (Last 24 hours) at 11/15/2021 1331  Last data filed at 11/15/2021 1300  Gross per 24 hour   Intake 2333.5 ml   Output 5958 ml   Net -3624.5 ml     Last 3 weights  Wt Readings from Last 3 Encounters:   11/15/21 163 lb 9.3 oz (74.2 kg)   10/25/21 164 lb (74.4 kg)   09/29/21 160 lb (72.6 kg)           Physical Exam :  General Appearance:  Well developed. No distress  Mouth/Throat:  Oral mucosa moist.   Neck:  Supple, no JVD  Lungs:  Breath sounds: clear, diminished  Heart[de-identified]  S1,S2 heard  Abdomen:  Soft, non - tender  Musculoskeletal:  Edema - noted but appears better         Last 3 CBC   Recent Labs     11/14/21  2115 11/15/21  0320 11/15/21  0836   WBC 22.4* 19.8* 17.5*   RBC 2.61* 2.49* 2.33*   HGB 7.5* 7.2* 6.7*   HCT 24.8* 23.9* 22.8*   PLT 46* 45* 44*     Last 3 CMP  Recent Labs     11/14/21  2115 11/15/21  0320 11/15/21  0836    136 135   K 4.1 4.1 4.2    103 103   CO2 27 27 27   BUN 20 16 14   CREATININE 0.7 0.6 0.6   CALCIUM 7.9* 8.0* 7.7*   LABALBU 2.8* 2.7* 2.6*   BILITOT 0.5 0.6 0.5             ASSESSMENT / Plan   1. Renal -acute kidney injury, multifactorial etiology which includes hypotension from sepsis, IV contrast exposure on 10/29 and has some hydronephrosis as well . ? Patient's creatinine continued to get worse, but primarily fluid status worsened, did not respond to diuretics so started her on CVVH on 11/5/2021 which was stopped on 11/12/2021 but has to be restarted again due to worsening respiratory status  ?  Continue 150 mm ultrafiltration for now, intake and output shows she is -19 L     2. Electrolytes -within normal limits, replace electrolytes per protocol  3. Mild acidosis improved with CVVH  4. Acute hypercapnic respiratory failure currently on a BIPAP, will be intubated soon  5. Pneumonia with pleural effusion and septic shock  6. Hypotension on pressors wean to MAP greater than 65.   7. Hydronephrosis status post nephrostomy tube placement  8. + ANCA, patient has been having some proteinuria as an outpatient and during my last visit have ordered all the serologic work-up , which the patient has done prior to getting admitted to the hospital however the ANCA was not done at that time. She may be having some renal pathology going on , but I doubt it has anything to do with acute renal dysfunction during this admission. However she has received steroids. Once clinically stable might consider a renal biopsy. 9. Meds reviewed and discussed with  family nursing staff and ICU team in detail    Dr. Rosendo Santo MD, M,D.  [facility].

## 2021-11-15 NOTE — FLOWSHEET NOTE
46- Dr Yohannes Dumont at bedside, informed him about patients breathing condition over night and increase in respiratory status. Showed him the VBG from the morning as well as the CXR. Dr Yohannes Dumont going to call family to discuss intubation vs code status. 46- Dr Yohannes Dumont at bedside to discuss plan of care with family.     1230- Patient intubated 7.5 21 at the lip

## 2021-11-16 ENCOUNTER — APPOINTMENT (OUTPATIENT)
Dept: GENERAL RADIOLOGY | Age: 71
DRG: 004 | End: 2021-11-16
Payer: MEDICARE

## 2021-11-16 LAB
ALBUMIN SERPL-MCNC: 2.6 G/DL (ref 3.5–5.1)
ALBUMIN SERPL-MCNC: 2.7 G/DL (ref 3.5–5.1)
ALP BLD-CCNC: 83 U/L (ref 38–126)
ALP BLD-CCNC: 87 U/L (ref 38–126)
ALP BLD-CCNC: 87 U/L (ref 38–126)
ALP BLD-CCNC: 89 U/L (ref 38–126)
ALT SERPL-CCNC: 10 U/L (ref 11–66)
ALT SERPL-CCNC: 11 U/L (ref 11–66)
ALT SERPL-CCNC: 11 U/L (ref 11–66)
ALT SERPL-CCNC: 12 U/L (ref 11–66)
ANION GAP SERPL CALCULATED.3IONS-SCNC: 10 MEQ/L (ref 8–16)
ANION GAP SERPL CALCULATED.3IONS-SCNC: 8 MEQ/L (ref 8–16)
ANION GAP SERPL CALCULATED.3IONS-SCNC: 9 MEQ/L (ref 8–16)
ANION GAP SERPL CALCULATED.3IONS-SCNC: 9 MEQ/L (ref 8–16)
AST SERPL-CCNC: 10 U/L (ref 5–40)
AST SERPL-CCNC: 11 U/L (ref 5–40)
BILIRUB SERPL-MCNC: 1.1 MG/DL (ref 0.3–1.2)
BILIRUB SERPL-MCNC: 1.2 MG/DL (ref 0.3–1.2)
BUN BLDV-MCNC: 17 MG/DL (ref 7–22)
BUN BLDV-MCNC: 17 MG/DL (ref 7–22)
BUN BLDV-MCNC: 21 MG/DL (ref 7–22)
BUN BLDV-MCNC: 24 MG/DL (ref 7–22)
CALCIUM IONIZED: 1.21 MMOL/L (ref 1.12–1.32)
CALCIUM SERPL-MCNC: 7.9 MG/DL (ref 8.5–10.5)
CALCIUM SERPL-MCNC: 8 MG/DL (ref 8.5–10.5)
CALCIUM SERPL-MCNC: 8.1 MG/DL (ref 8.5–10.5)
CALCIUM SERPL-MCNC: 8.2 MG/DL (ref 8.5–10.5)
CHLORIDE BLD-SCNC: 100 MEQ/L (ref 98–111)
CHLORIDE BLD-SCNC: 102 MEQ/L (ref 98–111)
CHLORIDE BLD-SCNC: 102 MEQ/L (ref 98–111)
CHLORIDE BLD-SCNC: 104 MEQ/L (ref 98–111)
CO2: 24 MEQ/L (ref 23–33)
CO2: 25 MEQ/L (ref 23–33)
CO2: 25 MEQ/L (ref 23–33)
CO2: 26 MEQ/L (ref 23–33)
CREAT SERPL-MCNC: 0.7 MG/DL (ref 0.4–1.2)
CREAT SERPL-MCNC: 0.7 MG/DL (ref 0.4–1.2)
CREAT SERPL-MCNC: 0.9 MG/DL (ref 0.4–1.2)
CREAT SERPL-MCNC: 1.1 MG/DL (ref 0.4–1.2)
EKG ATRIAL RATE: 61 BPM
EKG P AXIS: 48 DEGREES
EKG P-R INTERVAL: 174 MS
EKG Q-T INTERVAL: 454 MS
EKG QRS DURATION: 88 MS
EKG QTC CALCULATION (BAZETT): 457 MS
EKG R AXIS: 95 DEGREES
EKG T AXIS: 53 DEGREES
EKG VENTRICULAR RATE: 61 BPM
ERYTHROCYTE [DISTWIDTH] IN BLOOD BY AUTOMATED COUNT: 17.2 % (ref 11.5–14.5)
ERYTHROCYTE [DISTWIDTH] IN BLOOD BY AUTOMATED COUNT: 17.5 % (ref 11.5–14.5)
ERYTHROCYTE [DISTWIDTH] IN BLOOD BY AUTOMATED COUNT: 17.8 % (ref 11.5–14.5)
ERYTHROCYTE [DISTWIDTH] IN BLOOD BY AUTOMATED COUNT: 55.5 FL (ref 35–45)
ERYTHROCYTE [DISTWIDTH] IN BLOOD BY AUTOMATED COUNT: 57.5 FL (ref 35–45)
ERYTHROCYTE [DISTWIDTH] IN BLOOD BY AUTOMATED COUNT: 58.4 FL (ref 35–45)
GFR SERPL CREATININE-BSD FRML MDRD: 49 ML/MIN/1.73M2
GFR SERPL CREATININE-BSD FRML MDRD: 62 ML/MIN/1.73M2
GFR SERPL CREATININE-BSD FRML MDRD: 83 ML/MIN/1.73M2
GFR SERPL CREATININE-BSD FRML MDRD: 83 ML/MIN/1.73M2
GLUCOSE BLD-MCNC: 116 MG/DL (ref 70–108)
GLUCOSE BLD-MCNC: 117 MG/DL (ref 70–108)
GLUCOSE BLD-MCNC: 119 MG/DL (ref 70–108)
GLUCOSE BLD-MCNC: 122 MG/DL (ref 70–108)
GLUCOSE BLD-MCNC: 126 MG/DL (ref 70–108)
GLUCOSE BLD-MCNC: 131 MG/DL (ref 70–108)
GLUCOSE BLD-MCNC: 134 MG/DL (ref 70–108)
HCT VFR BLD CALC: 29.9 % (ref 37–47)
HCT VFR BLD CALC: 30.5 % (ref 37–47)
HCT VFR BLD CALC: 30.8 % (ref 37–47)
HEMOGLOBIN: 10 GM/DL (ref 12–16)
HEMOGLOBIN: 9.6 GM/DL (ref 12–16)
HEMOGLOBIN: 9.6 GM/DL (ref 12–16)
MAGNESIUM: 2 MG/DL (ref 1.6–2.4)
MAGNESIUM: 2 MG/DL (ref 1.6–2.4)
MAGNESIUM: 2.1 MG/DL (ref 1.6–2.4)
MAGNESIUM: 2.1 MG/DL (ref 1.6–2.4)
MCH RBC QN AUTO: 29.4 PG (ref 26–33)
MCH RBC QN AUTO: 29.9 PG (ref 26–33)
MCH RBC QN AUTO: 30.1 PG (ref 26–33)
MCHC RBC AUTO-ENTMCNC: 31.5 GM/DL (ref 32.2–35.5)
MCHC RBC AUTO-ENTMCNC: 32.1 GM/DL (ref 32.2–35.5)
MCHC RBC AUTO-ENTMCNC: 32.5 GM/DL (ref 32.2–35.5)
MCV RBC AUTO: 92.8 FL (ref 81–99)
MCV RBC AUTO: 93.1 FL (ref 81–99)
MCV RBC AUTO: 93.6 FL (ref 81–99)
PHOSPHORUS: 2.6 MG/DL (ref 2.4–4.7)
PHOSPHORUS: 2.7 MG/DL (ref 2.4–4.7)
PHOSPHORUS: 2.7 MG/DL (ref 2.4–4.7)
PHOSPHORUS: 2.8 MG/DL (ref 2.4–4.7)
PLATELET # BLD: 56 THOU/MM3 (ref 130–400)
PLATELET # BLD: 58 THOU/MM3 (ref 130–400)
PLATELET # BLD: 70 THOU/MM3 (ref 130–400)
PMV BLD AUTO: 10.6 FL (ref 9.4–12.4)
PMV BLD AUTO: 11.2 FL (ref 9.4–12.4)
PMV BLD AUTO: 11.4 FL (ref 9.4–12.4)
POTASSIUM REFLEX MAGNESIUM: 4.6 MEQ/L (ref 3.5–5.2)
POTASSIUM REFLEX MAGNESIUM: 4.6 MEQ/L (ref 3.5–5.2)
POTASSIUM REFLEX MAGNESIUM: 4.8 MEQ/L (ref 3.5–5.2)
POTASSIUM REFLEX MAGNESIUM: 4.8 MEQ/L (ref 3.5–5.2)
RBC # BLD: 3.21 MILL/MM3 (ref 4.2–5.4)
RBC # BLD: 3.26 MILL/MM3 (ref 4.2–5.4)
RBC # BLD: 3.32 MILL/MM3 (ref 4.2–5.4)
SODIUM BLD-SCNC: 134 MEQ/L (ref 135–145)
SODIUM BLD-SCNC: 136 MEQ/L (ref 135–145)
SODIUM BLD-SCNC: 137 MEQ/L (ref 135–145)
SODIUM BLD-SCNC: 137 MEQ/L (ref 135–145)
TOTAL PROTEIN: 4.6 G/DL (ref 6.1–8)
TOTAL PROTEIN: 4.6 G/DL (ref 6.1–8)
TOTAL PROTEIN: 4.7 G/DL (ref 6.1–8)
TOTAL PROTEIN: 4.9 G/DL (ref 6.1–8)
WBC # BLD: 12 THOU/MM3 (ref 4.8–10.8)
WBC # BLD: 12.9 THOU/MM3 (ref 4.8–10.8)
WBC # BLD: 14 THOU/MM3 (ref 4.8–10.8)

## 2021-11-16 PROCEDURE — 6370000000 HC RX 637 (ALT 250 FOR IP): Performed by: INTERNAL MEDICINE

## 2021-11-16 PROCEDURE — 71045 X-RAY EXAM CHEST 1 VIEW: CPT

## 2021-11-16 PROCEDURE — 2580000003 HC RX 258: Performed by: EMERGENCY MEDICINE

## 2021-11-16 PROCEDURE — 36415 COLL VENOUS BLD VENIPUNCTURE: CPT

## 2021-11-16 PROCEDURE — 36592 COLLECT BLOOD FROM PICC: CPT

## 2021-11-16 PROCEDURE — 2580000003 HC RX 258: Performed by: STUDENT IN AN ORGANIZED HEALTH CARE EDUCATION/TRAINING PROGRAM

## 2021-11-16 PROCEDURE — 6370000000 HC RX 637 (ALT 250 FOR IP): Performed by: STUDENT IN AN ORGANIZED HEALTH CARE EDUCATION/TRAINING PROGRAM

## 2021-11-16 PROCEDURE — 99291 CRITICAL CARE FIRST HOUR: CPT | Performed by: INTERNAL MEDICINE

## 2021-11-16 PROCEDURE — 83735 ASSAY OF MAGNESIUM: CPT

## 2021-11-16 PROCEDURE — 6360000002 HC RX W HCPCS: Performed by: INTERNAL MEDICINE

## 2021-11-16 PROCEDURE — 99233 SBSQ HOSP IP/OBS HIGH 50: CPT | Performed by: INTERNAL MEDICINE

## 2021-11-16 PROCEDURE — 82948 REAGENT STRIP/BLOOD GLUCOSE: CPT

## 2021-11-16 PROCEDURE — 2500000003 HC RX 250 WO HCPCS: Performed by: STUDENT IN AN ORGANIZED HEALTH CARE EDUCATION/TRAINING PROGRAM

## 2021-11-16 PROCEDURE — 93010 ELECTROCARDIOGRAM REPORT: CPT | Performed by: NUCLEAR MEDICINE

## 2021-11-16 PROCEDURE — 94640 AIRWAY INHALATION TREATMENT: CPT

## 2021-11-16 PROCEDURE — C9113 INJ PANTOPRAZOLE SODIUM, VIA: HCPCS | Performed by: NURSE PRACTITIONER

## 2021-11-16 PROCEDURE — 82330 ASSAY OF CALCIUM: CPT

## 2021-11-16 PROCEDURE — 80053 COMPREHEN METABOLIC PANEL: CPT

## 2021-11-16 PROCEDURE — 93005 ELECTROCARDIOGRAM TRACING: CPT | Performed by: NURSE PRACTITIONER

## 2021-11-16 PROCEDURE — 85027 COMPLETE CBC AUTOMATED: CPT

## 2021-11-16 PROCEDURE — 2580000003 HC RX 258: Performed by: INTERNAL MEDICINE

## 2021-11-16 PROCEDURE — 94003 VENT MGMT INPAT SUBQ DAY: CPT

## 2021-11-16 PROCEDURE — 6360000002 HC RX W HCPCS: Performed by: STUDENT IN AN ORGANIZED HEALTH CARE EDUCATION/TRAINING PROGRAM

## 2021-11-16 PROCEDURE — 84100 ASSAY OF PHOSPHORUS: CPT

## 2021-11-16 PROCEDURE — 6360000002 HC RX W HCPCS: Performed by: NURSE PRACTITIONER

## 2021-11-16 PROCEDURE — 2000000000 HC ICU R&B

## 2021-11-16 RX ADMIN — SODIUM CHLORIDE 1.3 MCG/KG/HR: 9 INJECTION, SOLUTION INTRAVENOUS at 07:34

## 2021-11-16 RX ADMIN — ALBUTEROL SULFATE 2.5 MG: 2.5 SOLUTION RESPIRATORY (INHALATION) at 00:30

## 2021-11-16 RX ADMIN — SODIUM HYPOCHLORITE: 1.25 SOLUTION TOPICAL at 09:14

## 2021-11-16 RX ADMIN — SODIUM CHLORIDE 1.3 MCG/KG/HR: 9 INJECTION, SOLUTION INTRAVENOUS at 22:28

## 2021-11-16 RX ADMIN — POLYETHYLENE GLYCOL 3350 17 G: 17 POWDER, FOR SOLUTION ORAL at 17:04

## 2021-11-16 RX ADMIN — CLINDAMYCIN PHOSPHATE 600 MG: 600 INJECTION, SOLUTION INTRAVENOUS at 16:59

## 2021-11-16 RX ADMIN — ALBUTEROL SULFATE 2.5 MG: 2.5 SOLUTION RESPIRATORY (INHALATION) at 18:20

## 2021-11-16 RX ADMIN — ALLOPURINOL 100 MG: 100 TABLET ORAL at 08:25

## 2021-11-16 RX ADMIN — CLINDAMYCIN PHOSPHATE 600 MG: 600 INJECTION, SOLUTION INTRAVENOUS at 22:30

## 2021-11-16 RX ADMIN — SODIUM CHLORIDE 1.4 MCG/KG/HR: 9 INJECTION, SOLUTION INTRAVENOUS at 03:30

## 2021-11-16 RX ADMIN — Medication 75 MCG/HR: at 08:04

## 2021-11-16 RX ADMIN — Medication 75 MCG/HR: at 23:39

## 2021-11-16 RX ADMIN — ALBUTEROL SULFATE 2.5 MG: 2.5 SOLUTION RESPIRATORY (INHALATION) at 12:47

## 2021-11-16 RX ADMIN — MIDODRINE HYDROCHLORIDE 10 MG: 10 TABLET ORAL at 16:58

## 2021-11-16 RX ADMIN — SODIUM CHLORIDE, PRESERVATIVE FREE 10 ML: 5 INJECTION INTRAVENOUS at 08:26

## 2021-11-16 RX ADMIN — TIOTROPIUM BROMIDE AND OLODATEROL 2 PUFF: 3.124; 2.736 SPRAY, METERED RESPIRATORY (INHALATION) at 08:47

## 2021-11-16 RX ADMIN — CLINDAMYCIN PHOSPHATE 600 MG: 600 INJECTION, SOLUTION INTRAVENOUS at 06:20

## 2021-11-16 RX ADMIN — SODIUM CHLORIDE, PRESERVATIVE FREE 10 ML: 5 INJECTION INTRAVENOUS at 22:03

## 2021-11-16 RX ADMIN — SODIUM CHLORIDE 1.3 MCG/KG/HR: 9 INJECTION, SOLUTION INTRAVENOUS at 18:23

## 2021-11-16 RX ADMIN — PHENYLEPHRINE HYDROCHLORIDE 30 MCG/MIN: 10 INJECTION INTRAVENOUS at 07:33

## 2021-11-16 RX ADMIN — ALBUTEROL SULFATE 2.5 MG: 2.5 SOLUTION RESPIRATORY (INHALATION) at 05:44

## 2021-11-16 RX ADMIN — PANTOPRAZOLE SODIUM 40 MG: 40 INJECTION, POWDER, FOR SOLUTION INTRAVENOUS at 08:25

## 2021-11-16 RX ADMIN — Medication 50 MCG/HR: at 01:03

## 2021-11-16 RX ADMIN — MIDODRINE HYDROCHLORIDE 10 MG: 10 TABLET ORAL at 22:03

## 2021-11-16 RX ADMIN — MIDODRINE HYDROCHLORIDE 10 MG: 10 TABLET ORAL at 06:19

## 2021-11-16 RX ADMIN — SODIUM CHLORIDE 1.3 MCG/KG/HR: 9 INJECTION, SOLUTION INTRAVENOUS at 10:43

## 2021-11-16 RX ADMIN — Medication 75 MCG/HR: at 16:17

## 2021-11-16 RX ADMIN — SODIUM CHLORIDE SOLN NEBU 3% 4 ML: 3 NEBU SOLN at 05:44

## 2021-11-16 RX ADMIN — SODIUM CHLORIDE 1.3 MCG/KG/HR: 9 INJECTION, SOLUTION INTRAVENOUS at 15:06

## 2021-11-16 ASSESSMENT — PAIN SCALES - GENERAL
PAINLEVEL_OUTOF10: 0

## 2021-11-16 ASSESSMENT — PULMONARY FUNCTION TESTS
PIF_VALUE: 27
PIF_VALUE: 29
PIF_VALUE: 28
PIF_VALUE: 29
PIF_VALUE: 30
PIF_VALUE: 25

## 2021-11-16 NOTE — PROGRESS NOTES
[facility]         Renal Inpatient Follow-Up note         11/16/2021 9:35 AM    Pt Name:   Mora Tran  YOB: 1950  Attending:   Christiana Joshua MD    Chief Complaint : Mora Tran is a 73 y.o. female being followed by nephrology for ALVIN/CKD    Interval History :   Patient seen and examined by me. No distress  Intubated and cannot accurately assess history or review of systems  No urine output. CVVH has clotted this morning. .  Still on pressors but slightly better  She is -21 L.   And she is on 30% FiO2     Scheduled Medications :    ketamine  100 mg IntraVENous Once    lidocaine 1 % injection  5 mL IntraDERmal Once    insulin lispro  0-6 Units SubCUTAneous Q6H    allopurinol  100 mg Oral Daily    midodrine  10 mg Oral Q8H    clindamycin (CLEOCIN) IV  600 mg IntraVENous Q8H    sodium chloride (Inhalant)  4 mL Nebulization 4 times per day    And    albuterol  2.5 mg Nebulization 4 times per day    phosphorus replacement protocol   Other RX Placeholder    potassium bicarb-citric acid  40 mEq Oral Once    tiotropium-olodaterol  2 puff Inhalation Daily    [Held by provider] aspirin  81 mg Oral Daily    pantoprazole  40 mg IntraVENous QAM    sodium hypochlorite   Irrigation Daily    Riociguat  2.5 mg Oral Q8H    sodium chloride flush  5-40 mL IntraVENous 2 times per day    [Held by provider] FLUoxetine  40 mg Oral Daily    [Held by provider] metoprolol tartrate  12.5 mg Oral BID      sodium chloride      midazolam      fentaNYL 500 mcg in sodium chloride 0.9% 100 ml infusion 75 mcg/hr (11/16/21 0804)    propofol      prismaSATE BGK 4/2.5 500 mL/hr at 11/15/21 1305    prismaSol BGK 4/2.5 500 mL/hr at 11/15/21 1305    prismaSol BGK 4/2.5 500 mL/hr at 11/15/21 1309    phenylephrine (KRISTAL-SYNEPHRINE) 50mg/250mL infusion 30 mcg/min (11/16/21 0733)    dextrose      dexmedetomidine 1.3 mcg/kg/hr (11/16/21 0734)    bumetanide 0.1 mg/mL infusion 0.5 mg/hr (11/05/21 6643)    sodium chloride 25 mL (11/15/21 6759)    [Held by provider] sodium chloride 50 mL/hr at 10/28/21 2314       Vitals :  BP (!) 108/51   Pulse 65   Temp 98.6 °F (37 °C) (Oral)   Resp 16   Ht 4' 9\" (1.448 m)   Wt 164 lb 3.9 oz (74.5 kg)   SpO2 99%   BMI 35.54 kg/m²     24HR INTAKE/OUTPUT:      Intake/Output Summary (Last 24 hours) at 11/16/2021 0935  Last data filed at 11/16/2021 0800  Gross per 24 hour   Intake 5102.87 ml   Output 6497 ml   Net -1394.13 ml     Last 3 weights  Wt Readings from Last 3 Encounters:   11/16/21 164 lb 3.9 oz (74.5 kg)   10/25/21 164 lb (74.4 kg)   09/29/21 160 lb (72.6 kg)           Physical Exam :  General Appearance:  Well developed. No distress  Mouth/Throat:  Oral mucosa moist.  ET tube in place  Neck:  Supple, no JVD  Lungs:  Breath sounds: clear, diminished  Heart[de-identified]  S1,S2 heard  Abdomen:  Soft, non - tender  Musculoskeletal:  Edema - noted but appears better         Last 3 CBC   Recent Labs     11/15/21  0836 11/15/21  2015 11/16/21  0320   WBC 17.5* 14.7* 14.0*   RBC 2.33* 3.54* 3.32*   HGB 6.7* 10.6* 10.0*   HCT 22.8* 32.7* 30.8*   PLT 44* 45* 56*     Last 3 CMP  Recent Labs     11/15/21  0836 11/15/21  2015 11/16/21  0320    134* 136  134*   K 4.2 4.4 4.8  4.8    101 102  100   CO2 27 26 26  25   BUN 14 14 17  17   CREATININE 0.6 0.6 0.7  0.7   CALCIUM 7.7* 8.1* 7.9*  8.0*   LABALBU 2.6* 2.7* 2.7*  2.6*   BILITOT 0.5 1.0 1.2  1.2             ASSESSMENT / Plan   1. Renal -acute kidney injury, multifactorial etiology which includes hypotension from sepsis, IV contrast exposure on 10/29 and has some hydronephrosis as well . ? Patient's creatinine continued to get worse, but primarily fluid status worsened, did not respond to diuretics so started her on CVVH on 11/5/2021 which was stopped on 11/12/2021 but has to be restarted again due to worsening respiratory status  ?  Now she is overall -21 L, on 30% FiO2, concentrate all the drips hold CVVH for now. Will restart again if needed.     2. Electrolytes -within normal limits, replace electrolytes per protocol  3. Mild acidosis improved with CVVH  4. Acute hypercapnic respiratory failure currently ventilator dependent  5. Pneumonia with pleural effusion and septic shock  6. Hypotension on pressors wean to MAP greater than 65.   7. Hydronephrosis status post nephrostomy tube placement  8. + ANCA, patient has been having some proteinuria as an outpatient and during my last visit have ordered all the serologic work-up , which the patient has done prior to getting admitted to the hospital however the ANCA was not done at that time. She may be having some renal pathology going on , but I doubt it has anything to do with acute renal dysfunction during this admission. However she has received steroids. Once clinically stable might consider a renal biopsy. 9. Meds reviewed and discussed with  nursing staff. Labs at 4 PM    Dr. Rosendo Santo MD, M,D.  [facility].

## 2021-11-16 NOTE — PROGRESS NOTES
histone negative, Anti Banks,and  Anti-Savana WNL,  GBM antibodies negative, C3/C4 levels normal, elevated kappa/lambda free light chains with normal ratio. No plans per Bx per nephrology. Fluid collection noted on left renal unlikely abscess as patient remains afebrile. 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 Will need Renal Bx for confirmation once stable. Discussed case w/ Nephrology. Completed pulse dose steroids starting on 11/8/21 w/  10mg/kg methylprednisolone for 3 days. SSI started for coverage. Not candidate for Rituxin/TPE 2/2 existing infection. No diffuse alveolar hemorrhage. Hydronephrosis 2/2 Left-sided staghorn calculi: Urology following. Percutaneous drainage tube 11/2/21 IR. Low output following tube placement. Increased flow on CBI per urology recs. Still flushing clots. Perinephric stranding noted on CT abdomen and pelvis w/ air fluid region discontiguous with region of nephrostomy tube insertion raised initial concern for abscess (<3 cm) vs emphysematous pyelonephritis when discussed with radiologist. Received 5 days empiric therapy w/ cefepime (started 11/5/21). On Vancomycin per above. Culture of nephrostomy tube aspirate from 11/5/21 shows staph epidermidis (vancomycin sensitive). Acute blood loss anemia: Suspect consumptive process. Leading differential , smear, reticulocytes, LDH, Haptoglobin, MERCEDES, B12/folate  pendingsuspect 2/2 hemolysis on CRRT. PRBC x1 transfused 10/31/21, 11/4/21, 11/5/21, x2 11/6/21, x3 11/8/21, x3 11/15/21. Heparin stopped (dialysis dose still running). Holding ASA. Thrombocytopenia: suspect consumptive process. Workup per above. Not heparin per above. Hematuria: s/p perc tube placement. CBI per above. Stopped heparin, holding ASA. Suspected Dysphagia: 2/2 prolonged intubation. SLP eval pending  Bilateral Pleural Effusion:   Associated volume overload in context of renal failure. Interval enlargement noted on CT 11/1/21.  Currently pleural effusion and underwent US guided thoracentesis w/ 1100 cc removed consistent w/ MRSA/adenovirus. Patient was noted to be in afib/RVR and was placed on amio, heparin drip. Patient was also noted to have normocytic anemia and received 1 unit PRBC 10/31/21. CT abdomen revealed CBD dilation w/ cholelithiasis. 11/1/2021: Plan for nephrostomy tubes today. Hemoglobin 7.7 s/p 1 unit PRBC yesterday. Weaned to 4 mics Levophed. 11/2/2021: Patient resumed back on prior dose of vancomycin. Low urine output w/ increasing pressor requirements today    11/3/2021: Will attempt SBT if able to wean today. Increased Amio drip 2/2 worsening tachycardia. Continued hematuria w/ low urine output. Cr stable. Will evaluate UTI following perc tube placement. Rise in WBC noted. Culture pending    11/4/2021: Cardioversion on 11/3/2021. Now and NSR. CVP 29 this a.m. Suspect drop in CI 2/2 to stopping levophed. Diffuse edema following transfusion overnight. Given one-time dose of 2 mg Bumex and albumin. SBT this AM    11/5/2021: Failed SBT yesterday. Continued low urine output. Trial of Bumex today. Will proceed with dialysis if fails to improve. 11/6/2021: Started on CRRT, continues to have bloody drainage of nephrostomy tube given 1 unit PRBC, cefipime emperically pending cultures of nephrostomy tube fluid and repeat resp cultures. 11/7/21: Patient remains clinically well on exam.  We'll continue medical of UF with CRRT. On empiric cefepime for coverage of perinephric abscess noted on CT abdomen and pelvis November 4, 2021. White count trending down. Will attempt spontaneous breathing trial in a.m.    11/8/21: Transfused 1x PRBC this AM. Anemia workup pending. WBC trending down. Patient ventilator settings this AM preclude SBT. Volume overloaded on exam. UF increased to 125 cc/hr this AM. Will attempt to wean vent settings further further. Coag neg staph growing on nephrostomy tube aspirate. Suspect contamination. Continue existing ABx    11/9/21: Pulse dose steroids initiated overnight. Started on SSI. Urine output now improving 75cc/hr. Transfused 3 units PRBCs yesterday for Improve DO2. MERCEDES noted w/ 875 mg deficit. Will replace once steroids/ABx completed. 11/10/21 Extubated overnight. SLP eval today. Continue BiPAP while asleep w/ transition to NC while awake as tolerated. No tachypnea. Day 3 pulse dose steroids. Continued bloody drainage from nephrostomy tube. Holding Theta Limber     11/11/21 Worsening bilateral infiltrates with SpO2 <90% on HFNC. Continued pulmonary hygiene w/ nebulized hypertonic saline, acapella and breathing treatments in addition to deep suctioning. Continued on Vancomycin for MRSA PNA. Plan for family meeting this AM to discuss goals of care. 11/12/21 Follow-up discussion from family meeting. Patient nodded agreement with full code status including reintubation leading to tracheostomy and PEG placement and continued hemodialysis if needed. Patient nodded agreement and understanding with these decisions. CXR yesterday evening did show left mainstem mucus plugging with cutoff sign and absent breath sounds and did undergo emergent bronchoscopy overnight on 11/12/21. Diminished breath sounds and worsening respiratory status this AM concerning for continued plugging. Stat chest X-ray ordered for confirmation. 11/13/21: Respiratory status continues to decline, she is requiring BiPAP with FiO2 of 70%. Chest x-ray shows complete opacification of the left lung secondary to mucous plugging, also the patient has left-sided pleural effusion. Plan for bronchoscopy. 11/15/2021: Family meeting today per event note. Reintubated today after failed BiPAP    11/16/2021: will dc abx tomorrow. PNA panel negative. Plan for tracheostomy time permitting today. Weaning phenylephrine as tolerated. Past Medical History:  Per HPI. Family History:  DM in father and sister. Social History: chronic smoker.     ROS Patient reports fatigue,  A 12 point review of systems was otherwise negative    Scheduled Meds:   ketamine  100 mg IntraVENous Once    lidocaine 1 % injection  5 mL IntraDERmal Once    insulin lispro  0-6 Units SubCUTAneous Q6H    allopurinol  100 mg Oral Daily    midodrine  10 mg Oral Q8H    clindamycin (CLEOCIN) IV  600 mg IntraVENous Q8H    sodium chloride (Inhalant)  4 mL Nebulization 4 times per day    And    albuterol  2.5 mg Nebulization 4 times per day    phosphorus replacement protocol   Other RX Placeholder    potassium bicarb-citric acid  40 mEq Oral Once    tiotropium-olodaterol  2 puff Inhalation Daily    [Held by provider] aspirin  81 mg Oral Daily    pantoprazole  40 mg IntraVENous QAM    sodium hypochlorite   Irrigation Daily    Riociguat  2.5 mg Oral Q8H    sodium chloride flush  5-40 mL IntraVENous 2 times per day    [Held by provider] FLUoxetine  40 mg Oral Daily    [Held by provider] metoprolol tartrate  12.5 mg Oral BID     Continuous Infusions:   sodium chloride      midazolam      fentaNYL 500 mcg in sodium chloride 0.9% 100 ml infusion 75 mcg/hr (11/16/21 0115)    propofol      prismaSATE BGK 4/2.5 500 mL/hr at 11/15/21 1305    prismaSol BGK 4/2.5 500 mL/hr at 11/15/21 1305    prismaSol BGK 4/2.5 500 mL/hr at 11/15/21 1309    phenylephrine (KRISTAL-SYNEPHRINE) 50mg/250mL infusion 30 mcg/min (11/16/21 0733)    dextrose      dexmedetomidine 1.3 mcg/kg/hr (11/16/21 0734)    bumetanide 0.1 mg/mL infusion 0.5 mg/hr (11/05/21 0904)    sodium chloride 25 mL (11/15/21 2239)    [Held by provider] sodium chloride 50 mL/hr at 10/28/21 2314       PHYSICAL EXAMINATION:  T:  98.2. P: 73 RR:  22. B/P: 104/47 FiO2: 65. O2 Sat:  95%. I/O: 656/2530 net -1873  Body mass index is 35.4 kg/m². GCS:   8  PC: 12 PEEP: 6: TV: 380: RRTotal: 18: General: Acute on chronically ill-appearing elderly white female  HEENT: Temporal wasting appreciated. Normocephalic and atraumatic. No scleral icterus.  PERR  Neck: supple. No Thyromegaly. Left subclavian dialysis catheter   Lungs: Mild rhonchi appreciated in lower lung fields (interval improvement) Absent left lower lobe breath sounds. No retractions  Cardiac: RRR. No JVD. Abdomen: soft. Nontender. ,npehrostomy tube with bloody drainage  Extremities:  +1 pitting edema x4. (interval improvement noted) No clubbing, cyanosis   Vasculature: capillary refill < 3 seconds. Palpable dorsalis pedis pulses. Skin:  warm and dry. Psych: Alert and oriented to person place and time, affect appropriate  Lymph:  No supraclavicular adenopathy. Neurologic:  No focal deficit. No seizures. Data: (All radiographs, tracings, PFTs, and imaging are personally viewed and interpreted unless otherwise noted). CMP: Sodium 136 potassium 4.8 chloride 102 bicarb 26 BUN/creatinine 17/0.7 anion gap 9 GFR 83 magnesium 2.0 glucose 122 calcium 7.9 phosphorus 2.8 total protein 4.6 (2.7 albumin 2.7 alk phos 87 ALT/AST 12/11 total bilirubin 1.2 total protein 4.6   CBC WBC 14.0 H&H 10.0/30.8 platelets 56  CBC WBC 17.5 H&H 6.7/22.8 platelets 44  SARS-CoV-2 NAAT negative on 10/27/2021  Telemetry shows NSR  Chest x-ray 10/31/2021 demonstrated diffuse bilateral patchy opacities with left-sided pleural effusion and cardiomegaly  Respiratory culture  11/5/21 pending  Pneumonia panel collected 10/27/2021 demonstrates staph aureus. MEC-A gene and adenovirus  CT chest on 11/1/2021 demonstrates bilateral pleural effusions with interval enlargement  CXR 11/3/2021 demonstrates worsening pulmonary congestion with bilateral effusions  UA shows bacteria w/ moderate white cells  CT Abdomen and pelvis 11/5/21 showed perinephric stranding with left-sided hypodense nodule with air-fluid level concerning for abscess. Diffuse ascites and anasarca noted.   CXR November 7, 2021 shows bilateral pulmonary edema with worsening right-sided infiltrate  CXR November 9, 2021 shows improvement in bilateral pulmonary edema with worsening left sided infiltrate   Repeat CXR 11/12/21 pending at time of evaluation. Chest x-ray on 11/16/2021 demonstrates persistent bilateral pleural effusions and slight improvement in interstitial disease noted bilaterally         Meets Continued ICU Level Care Criteria:    [x] Yes   [] No - Transfer Planned to listed location:  [] HOSPITALIST CONTACTED- DR       Electronically signed by Chiki Hennessy DO PGY-2 on 11/16/2021 at 8:01 AM      Patient seen by me. Case discussed with resident physician. Continue with mechanical ventilator support. Dialysis per nephrology. Continue with antibiotics. Italicized font represents changes to the note made by me. CC time 35 minutes. Time was discontiguous. Time does not include procedures. Time does include my direct assessment of the patient and coordination of care.   Electronically signed by Suresh Lin MD on 11/17/2021 at 7:59 AM

## 2021-11-16 NOTE — CARE COORDINATION
11/16/21, 3:14 PM EST    DISCHARGE ON GOING EVALUATION    [facility] day: 20  Location: -09/009-A Reason for admit: Acute respiratory failure ([address] [J96.00]  Flash pulmonary edema ([address] [J81.0]  Acute respiratory failure with hypercapnia ([address] [J96.02]   Procedure:   10/27 CXR: Near complete opacification of the left hemithorax with very little aerated lung visualized in the left upper lobe. Small right pleural effusion and right lower lobe consolidation obscures visualization of the right hemidiaphragm pulmonary vascular congestion is observed  10/27 Intubated  10/27 CVC right subclavian  10/27 IR: Left thoracentesis with 1.1L removed  10/28 EEG: no definitive evidence of epileptiform activity appreciated. 10/29 Cardiac Cath with SGC and michelle placed - removed 11/4  10/31 CT Abd/pelvis:   1. Bilateral pleural effusions and abnormal densities in the right and left lower lobes consistent with inflammatory process. 2. Cardiomegaly. Small pericardial effusion. 3. Small amount of fluid surrounding the liver. 4. Gallstone. Increased attenuation in the gallbladder. No pericholecystic fluid or biliary dilatation. 5. Large staghorn calculus in the left kidney. Severe left-sided hydronephrosis and hydroureter. Increased density in the left perinephric fat consistent with inflammatory process. 6. Atherosclerotic calcification in the abdominal aorta, iliac and femoral arteries. 7. Padilla catheter within the bladder. 8. Lumbar spondylosis. 9. Increased density in the skin and subcutaneous soft tissues suggestive of edema or anasarca. 10. Enteric tube.  New [place]-Kasia catheter in place, seen better on plain radiographs     11/1 CT Chest: Moderate bilateral pleural effusions have increased in size in the interval with associated adjacent atelectasis; Persistent pulmonary vascular congestion/edema with cardiomegaly; Decreased pericardial effusion  11/1 Left Nephrostomy tube placed in IR  11/3 Cardioverted x1 to NSR  11/4 CT Abd/pelvis:   1. Interval percutaneous left nephrostomy tube placement with small amount of blood in the renal pelvis about the nephrostomy tube. 2. Hypodense lesion in the anterior aspect of the interpolar region of the left kidney with small focus of air at the margin. Developing abscess can't be excluded. 3. Scattered foci of air elsewhere in the kidney likely sequelae of nephrostomy tube placement. 4. Mildly worsening anasarca with persistent small to moderate ascites and moderate pleural effusions which may be cardiogenic given cardiomegaly. 5. Worsening opacities adjacent to the pleural effusions as evidence for worsening atelectasis or infiltrates. 6. Small hemopericardium, stable compared to prior exam.   7. Stable retroperitoneal periaortic lymphadenopathy at the level of the left kidney     11/5 TESSIO left subclavian  11/5 CRRT initiated  11/9 Extubated  11/9 CT Abd/pelvis:   1. Very limited evaluation. Large bilateral pleural effusions and bilateral airspace infiltrates. #2 subcutaneous edema throughout the abdomen and pelvis indicating presence of third spacing. 3. Presence or absence of a perinephric abscess cannot be confirmed     11/11 Bronch: Left main stem mucous plug; removed. 11/12 CRRT stopped  11/13 CRRT restarted  11/13 Bronch: Left main stem mucous plug removed; left lobes with edematous mucosa  11/14 CBI stopped  11/15 Reintubated  11/16 CRRT stopped  11/16 CXR: Persistent small bilateral pleural effusions with bibasilar opacities    Barriers to Discharge: CRRT restarted on 11/13 - stopped this morning. Bronch done on 11/13. CBI stopped on 11/14. Required reintubation d/t respiratory distress yesterday. Plan for Alanda Dilling. PICC ordered. Remains on vent w/ETT on PCV, peep 6, FIO2 30%, sats 99%. Tmax 99.6. NSR. Follows commands. Intensivist, Cardiology, GI, Nephrology, and Urology following. Respiratory culture +MRSA. SLP/PT/OT.  Telemetry, CVC, TESSIO, michelle, NG w/TF @ 30 ml/hr, left nephrostomy tube, flexiseal, seaman, SCDs. Bumex @ 0.5 mg/hr, Precedex @ 1.3 mcg/kg/hr, fentanyl @ 75 mcg/hr, addie @ 25 mcg/min, allopurinol, IV clindamycin, SSI, midodrine, IV protonix, riociguat, nebs, dakins daily, inhaler, Electrolyte replacement protocols. PCP: Ricardo Mitchell MD  Readmission Risk Score: 17.4 ( )%  Patient Goals/Plan/Treatment Preferences: From home alone and current with [facility]. DOT on case. Plan for [facility] when stable. No precert required.

## 2021-11-16 NOTE — PROGRESS NOTES
[facility] THERAPY MISSED TREATMENT NOTE  [facility] ICU 4D      Date: 2021  Patient Name: Sandra Wilson        MRN: [id number]    : 1950  (73 y.o.)    REASON FOR MISSED TREATMENT:  Attempted to see patient at 0830 for dysphagia interventions. NOAM Rooney Read reporting patient maintains with needs for oral intubation; plans for tracheostomy placement at date. Will closely monitor medical+respiratory status, completing repeat clinical swallow evaluation as it becomes clinically indicated.        Drake Alvarez M.A., [address] [place] [address]

## 2021-11-16 NOTE — PROGRESS NOTES
[facility] THERAPY MISSED TREATMENT NOTE  [facility] ICU 4D  4D-009-A      Date: 2021  Patient Name: Tye Aragon        CSN: [id number]   : 1950  (73 y.o.)  Gender: female   Referring Practitioner: Rosita Navarro DO  Diagnosis: Acute respiratory failure         REASON FOR MISSED TREATMENT: Hold Treatment per Nursing. Pt with plans for trach placement this date, will hold and check back  as able.

## 2021-11-16 NOTE — PROGRESS NOTES
[facility] [address]  PHYSICAL THERAPY MISSED TREATMENT NOTE  [facility] ICU 4D    Date: 2021  Patient Name: Glenn Simon        MRN: [id number]   : 1950  (77 y.o.)  Gender: female                REASON FOR MISSED TREATMENT:  Hold treatment per nursing request.      Pt with plans for trach placement this date, will hold and check back  as able.     Shala Raines PT, DPT

## 2021-11-16 NOTE — PROGRESS NOTES
Attempted PICC insertion. Noted patient has a Dialysis catheter not able to place PICC of left side. Assessed patient right arm noted right basilic measurement of 6.29VV circumference. Picture taken, measurement completed, kit opened sterile supplies dropped on sterile surface, sterile gown and gloves donned, sterile drape placed on patient, Lidocaine 1% given. Attempted to access right basilic with noted aspiration of blood, attempted to thread guidewire with resistance meet. Attempted second time to access right basilic with no success. More Lidocaine 1% given to patient near and at right brachial, attempted to access right brachial with noted aspiration of blood, attempted to thread guidewire with noted resistance, attempted to re access right brachial with no success. Stopped procedure cleaned patient. Informed Jose Alfredo SANTACRUZ.

## 2021-11-16 NOTE — PROGRESS NOTES
Comprehensive Nutrition Assessment    Type and Reason for Visit:  Reassess (TF check)    Nutrition Recommendations/Plan:   TF held for possible trach placement today per RN. Following trach placement, recommend restart Nepro TF at 30 ml/hr. Free H20 per Dr.  As appropriate, recommend swallow evaluation. Recommend probiotic. Continue weight checks. Nutrition Assessment:    Pt. nutritionally compromised AEB NPO status due to failed swallow evaluation 11/10, reintubated 11/15 and TF held for possible trach placement today.  At risk for further nutrition compromise r/t admitted with acute respiratory failure, intubated 10/27, extubated 11/9, reintubated 11/15, AFib, ALVIN, CRRT this admit ,  stage 3 sacral wound, 11/1 nephrosotmy tube placement, anemia , concern for cholecystitis, TF intolerances this admit and underlying medical condition (CKD, s/p ID of buttock wound 8/6/21,CHF, gout, pulmonary HTN, obesity ).  Nutrition recommendations/interventions as per above    Malnutrition Assessment:  Malnutrition Status:   At risk for malnutrition (Comment)    Context:  Acute Illness     Findings of the 6 clinical characteristics of malnutrition:  Energy Intake:  1 - 75% or less of estimated energy requirements for 7 or more days  Weight Loss:   (5.8% weight loss in 3.5 months)     Body Fat Loss:  No significant body fat loss     Muscle Mass Loss:  Unable to assess    Fluid Accumulation:  1 - Mild     Strength:  Not Performed    Estimated Daily Nutrient Needs:  Energy (kcal):  ~1127 kcals (15); Weight Used for Energy Requirements:  Admission (75.1 kg)     Protein (g):  ~59 grams ( 1.4) monitor reanl status; Weight Used for Protein Requirements:  Ideal (42.3 kg)        Fluid (ml/day):  per Aliyah Diop Used for Fluid Requirements:  Other (Comment)      Nutrition Related Findings:     Patient intubated 10/27, extubated 11/9, failed swallow evaluation per Speech Therapy 11/10 , reintubated 11/15 and TF held for possible trach placement today per RN. Note 507 ml TF recorded yesterday ( ~897 kcals). Per Juliano Tavera RN pt is down 19 Liters & at admit weight , CRRT is off now and rectal tube output was about 200 ml. WBC 14, Hgb 10, platelets 56. Per RN platelets up from yesterday & pt got 3 units yesterday. MAP 86.  meds include precedex, fentanyl, addie, Humalog, prn gycolax, prn senna     Wounds:  Stage III (on coccyx per RN)       Current Nutrition Therapies:    Current Tube Feeding (TF) Orders:  · Feeding Route: Nasogastric (placement 11/11)  · Formula: Renal Formula (Nepro)  · Schedule: Continuous (goal 30 ml/hr)  · Additives/Modulars:  (.)  · Water Flushes: per Dr  · Current TF & Flush Orders Provides: TF held for possible trach placement today  · Goal TF & Flush Orders Provides: 8457 kcals, 58 grams protein, 115 grams CHO, 18 grams fiber, 523 ml free H20 in 720 ml TF/24 hours      Anthropometric Measures:  · Height: 4' 9\" (144.8 cm)  · Current Body Weight: 164 lb 3.9 oz (74.5 kg) (11/16 +1 edema)   · Admission Body Weight: 165 lb 9.1 oz (75.1 kg) (10/27 +1 edema)    · Usual Body Weight: 175 lb 11.3 oz (79.7 kg) (per EMR 7/16/21)     · BMI: 35.5  · Adjusted Body Weight:  ;  (IBW ~93 #)   · BMI Categories: Obese Class 2 (BMI 35.0 -39.9) (on admit)       Nutrition Diagnosis:   · Inadequate oral intake related to swallowing difficulty as evidenced by NPO or clear liquid status due to medical condition, nutrition support - enteral nutrition      Nutrition Interventions:   Food and/or Nutrient Delivery:  Continue Current Tube Feeding  Nutrition Education/Counseling:  Education not appropriate   Coordination of Nutrition Care:  Continue to monitor while inpatient, Interdisciplinary Rounds    Goals:  Tube feeding to provide 75% or more of estimated nutrient needs until able to transition to oral diet during LOS       Nutrition Monitoring and Evaluation:   Behavioral-Environmental Outcomes:  None Identified   Food/Nutrient Intake Outcomes: Enteral Nutrition Intake/Tolerance  Physical Signs/Symptoms Outcomes:  Biochemical Data, GI Status, Nausea or Vomiting, Fluid Status or Edema, Hemodynamic Status, Nutrition Focused Physical Findings, Skin, Weight     Discharge Planning:     Too soon to determine     Electronically signed by Nolvia Lopes RD, LD on 11/16/21 at 11:05 AM EST    Contact: [phone]

## 2021-11-17 ENCOUNTER — APPOINTMENT (OUTPATIENT)
Dept: GENERAL RADIOLOGY | Age: 71
DRG: 004 | End: 2021-11-17
Payer: MEDICARE

## 2021-11-17 ENCOUNTER — APPOINTMENT (OUTPATIENT)
Dept: CT IMAGING | Age: 71
DRG: 004 | End: 2021-11-17
Payer: MEDICARE

## 2021-11-17 LAB
ALBUMIN SERPL-MCNC: 2.5 G/DL (ref 3.5–5.1)
ALP BLD-CCNC: 92 U/L (ref 38–126)
ALT SERPL-CCNC: 12 U/L (ref 11–66)
ANION GAP SERPL CALCULATED.3IONS-SCNC: 9 MEQ/L (ref 8–16)
AST SERPL-CCNC: 12 U/L (ref 5–40)
BILIRUB SERPL-MCNC: 0.8 MG/DL (ref 0.3–1.2)
BUN BLDV-MCNC: 32 MG/DL (ref 7–22)
CALCIUM IONIZED: 1.2 MMOL/L (ref 1.12–1.32)
CALCIUM SERPL-MCNC: 8.1 MG/DL (ref 8.5–10.5)
CHLORIDE BLD-SCNC: 102 MEQ/L (ref 98–111)
CO2: 24 MEQ/L (ref 23–33)
CREAT SERPL-MCNC: 1.5 MG/DL (ref 0.4–1.2)
ERYTHROCYTE [DISTWIDTH] IN BLOOD BY AUTOMATED COUNT: 18.2 % (ref 11.5–14.5)
ERYTHROCYTE [DISTWIDTH] IN BLOOD BY AUTOMATED COUNT: 58.9 FL (ref 35–45)
GFR SERPL CREATININE-BSD FRML MDRD: 34 ML/MIN/1.73M2
GLUCOSE BLD-MCNC: 101 MG/DL (ref 70–108)
GLUCOSE BLD-MCNC: 104 MG/DL (ref 70–108)
GLUCOSE BLD-MCNC: 119 MG/DL (ref 70–108)
GLUCOSE BLD-MCNC: 125 MG/DL (ref 70–108)
GRAM STAIN RESULT: NORMAL
HCT VFR BLD CALC: 29.2 % (ref 37–47)
HEMOGLOBIN: 9.4 GM/DL (ref 12–16)
MAGNESIUM: 2 MG/DL (ref 1.6–2.4)
MCH RBC QN AUTO: 30 PG (ref 26–33)
MCHC RBC AUTO-ENTMCNC: 32.2 GM/DL (ref 32.2–35.5)
MCV RBC AUTO: 93.3 FL (ref 81–99)
PHOSPHORUS: 3.3 MG/DL (ref 2.4–4.7)
PLATELET # BLD: 94 THOU/MM3 (ref 130–400)
PMV BLD AUTO: 10.1 FL (ref 9.4–12.4)
POTASSIUM REFLEX MAGNESIUM: 4.8 MEQ/L (ref 3.5–5.2)
RBC # BLD: 3.13 MILL/MM3 (ref 4.2–5.4)
RESPIRATORY CULTURE: NORMAL
SODIUM BLD-SCNC: 135 MEQ/L (ref 135–145)
TOTAL PROTEIN: 4.6 G/DL (ref 6.1–8)
WBC # BLD: 13.1 THOU/MM3 (ref 4.8–10.8)

## 2021-11-17 PROCEDURE — 31600 PLANNED TRACHEOSTOMY: CPT

## 2021-11-17 PROCEDURE — 2580000003 HC RX 258: Performed by: INTERNAL MEDICINE

## 2021-11-17 PROCEDURE — 94761 N-INVAS EAR/PLS OXIMETRY MLT: CPT

## 2021-11-17 PROCEDURE — 80053 COMPREHEN METABOLIC PANEL: CPT

## 2021-11-17 PROCEDURE — C9113 INJ PANTOPRAZOLE SODIUM, VIA: HCPCS | Performed by: NURSE PRACTITIONER

## 2021-11-17 PROCEDURE — 6370000000 HC RX 637 (ALT 250 FOR IP): Performed by: INTERNAL MEDICINE

## 2021-11-17 PROCEDURE — 99233 SBSQ HOSP IP/OBS HIGH 50: CPT | Performed by: INTERNAL MEDICINE

## 2021-11-17 PROCEDURE — 2580000003 HC RX 258: Performed by: EMERGENCY MEDICINE

## 2021-11-17 PROCEDURE — 2720000010 HC SURG SUPPLY STERILE

## 2021-11-17 PROCEDURE — 6360000002 HC RX W HCPCS: Performed by: NURSE PRACTITIONER

## 2021-11-17 PROCEDURE — 31502 CHANGE OF WINDPIPE AIRWAY: CPT

## 2021-11-17 PROCEDURE — 31600 PLANNED TRACHEOSTOMY: CPT | Performed by: INTERNAL MEDICINE

## 2021-11-17 PROCEDURE — 74176 CT ABD & PELVIS W/O CONTRAST: CPT

## 2021-11-17 PROCEDURE — 82330 ASSAY OF CALCIUM: CPT

## 2021-11-17 PROCEDURE — 2580000003 HC RX 258: Performed by: STUDENT IN AN ORGANIZED HEALTH CARE EDUCATION/TRAINING PROGRAM

## 2021-11-17 PROCEDURE — 2500000003 HC RX 250 WO HCPCS: Performed by: STUDENT IN AN ORGANIZED HEALTH CARE EDUCATION/TRAINING PROGRAM

## 2021-11-17 PROCEDURE — 87205 SMEAR GRAM STAIN: CPT

## 2021-11-17 PROCEDURE — 36415 COLL VENOUS BLD VENIPUNCTURE: CPT

## 2021-11-17 PROCEDURE — 2700000000 HC OXYGEN THERAPY PER DAY

## 2021-11-17 PROCEDURE — 83735 ASSAY OF MAGNESIUM: CPT

## 2021-11-17 PROCEDURE — 71045 X-RAY EXAM CHEST 1 VIEW: CPT

## 2021-11-17 PROCEDURE — 2500000003 HC RX 250 WO HCPCS: Performed by: NURSE PRACTITIONER

## 2021-11-17 PROCEDURE — 94003 VENT MGMT INPAT SUBQ DAY: CPT

## 2021-11-17 PROCEDURE — 0B113F4 BYPASS TRACHEA TO CUTANEOUS WITH TRACHEOSTOMY DEVICE, PERCUTANEOUS APPROACH: ICD-10-PCS | Performed by: INTERNAL MEDICINE

## 2021-11-17 PROCEDURE — 0BJ08ZZ INSPECTION OF TRACHEOBRONCHIAL TREE, VIA NATURAL OR ARTIFICIAL OPENING ENDOSCOPIC: ICD-10-PCS | Performed by: INTERNAL MEDICINE

## 2021-11-17 PROCEDURE — 82948 REAGENT STRIP/BLOOD GLUCOSE: CPT

## 2021-11-17 PROCEDURE — 2000000000 HC ICU R&B

## 2021-11-17 PROCEDURE — 84100 ASSAY OF PHOSPHORUS: CPT

## 2021-11-17 PROCEDURE — 85027 COMPLETE CBC AUTOMATED: CPT

## 2021-11-17 PROCEDURE — 6360000002 HC RX W HCPCS: Performed by: INTERNAL MEDICINE

## 2021-11-17 PROCEDURE — 94640 AIRWAY INHALATION TREATMENT: CPT

## 2021-11-17 PROCEDURE — 6360000002 HC RX W HCPCS: Performed by: STUDENT IN AN ORGANIZED HEALTH CARE EDUCATION/TRAINING PROGRAM

## 2021-11-17 PROCEDURE — 99291 CRITICAL CARE FIRST HOUR: CPT | Performed by: INTERNAL MEDICINE

## 2021-11-17 PROCEDURE — 3609027000 HC BRONCHOSCOPY

## 2021-11-17 RX ORDER — KETAMINE HCL IN NACL, ISO-OSM 100MG/10ML
100 SYRINGE (ML) INJECTION ONCE
Status: COMPLETED | OUTPATIENT
Start: 2021-11-17 | End: 2021-11-17

## 2021-11-17 RX ORDER — CISATRACURIUM BESYLATE 2 MG/ML
10 INJECTION, SOLUTION INTRAVENOUS ONCE
Status: COMPLETED | OUTPATIENT
Start: 2021-11-17 | End: 2021-11-17

## 2021-11-17 RX ORDER — LORAZEPAM 2 MG/ML
4 INJECTION INTRAMUSCULAR ONCE
Status: COMPLETED | OUTPATIENT
Start: 2021-11-17 | End: 2021-11-17

## 2021-11-17 RX ORDER — MIDAZOLAM HYDROCHLORIDE 1 MG/ML
INJECTION INTRAMUSCULAR; INTRAVENOUS
Status: DISCONTINUED
Start: 2021-11-17 | End: 2021-11-17 | Stop reason: WASHOUT

## 2021-11-17 RX ORDER — FENTANYL CITRATE 50 UG/ML
100 INJECTION, SOLUTION INTRAMUSCULAR; INTRAVENOUS ONCE
Status: COMPLETED | OUTPATIENT
Start: 2021-11-17 | End: 2021-11-17

## 2021-11-17 RX ADMIN — SODIUM CHLORIDE SOLN NEBU 3% 4 ML: 3 NEBU SOLN at 06:30

## 2021-11-17 RX ADMIN — CLINDAMYCIN PHOSPHATE 600 MG: 600 INJECTION, SOLUTION INTRAVENOUS at 17:17

## 2021-11-17 RX ADMIN — SODIUM CHLORIDE SOLN NEBU 3% 4 ML: 3 NEBU SOLN at 01:10

## 2021-11-17 RX ADMIN — SODIUM CHLORIDE SOLN NEBU 3% 4 ML: 3 NEBU SOLN at 12:37

## 2021-11-17 RX ADMIN — PANTOPRAZOLE SODIUM 40 MG: 40 INJECTION, POWDER, FOR SOLUTION INTRAVENOUS at 09:56

## 2021-11-17 RX ADMIN — SODIUM CHLORIDE SOLN NEBU 3% 4 ML: 3 NEBU SOLN at 17:30

## 2021-11-17 RX ADMIN — ALBUTEROL SULFATE 2.5 MG: 2.5 SOLUTION RESPIRATORY (INHALATION) at 12:37

## 2021-11-17 RX ADMIN — ALLOPURINOL 100 MG: 100 TABLET ORAL at 09:56

## 2021-11-17 RX ADMIN — MIDODRINE HYDROCHLORIDE 10 MG: 10 TABLET ORAL at 06:24

## 2021-11-17 RX ADMIN — CISATRACURIUM BESYLATE 10 MG: 2 INJECTION INTRAVENOUS at 16:58

## 2021-11-17 RX ADMIN — Medication 100 MCG/HR: at 09:15

## 2021-11-17 RX ADMIN — MIDODRINE HYDROCHLORIDE 10 MG: 10 TABLET ORAL at 17:16

## 2021-11-17 RX ADMIN — Medication 100 MCG/HR: at 04:09

## 2021-11-17 RX ADMIN — CLINDAMYCIN PHOSPHATE 600 MG: 600 INJECTION, SOLUTION INTRAVENOUS at 23:52

## 2021-11-17 RX ADMIN — SODIUM CHLORIDE 1 MCG/KG/HR: 9 INJECTION, SOLUTION INTRAVENOUS at 21:16

## 2021-11-17 RX ADMIN — SODIUM CHLORIDE, PRESERVATIVE FREE 40 ML: 5 INJECTION INTRAVENOUS at 21:00

## 2021-11-17 RX ADMIN — SODIUM HYPOCHLORITE: 1.25 SOLUTION TOPICAL at 13:05

## 2021-11-17 RX ADMIN — ALBUTEROL SULFATE 2.5 MG: 2.5 SOLUTION RESPIRATORY (INHALATION) at 17:30

## 2021-11-17 RX ADMIN — SODIUM CHLORIDE 1.3 MCG/KG/HR: 9 INJECTION, SOLUTION INTRAVENOUS at 06:21

## 2021-11-17 RX ADMIN — PHENYLEPHRINE HYDROCHLORIDE 50 MCG/MIN: 10 INJECTION INTRAVENOUS at 18:36

## 2021-11-17 RX ADMIN — LORAZEPAM 4 MG: 2 INJECTION INTRAMUSCULAR; INTRAVENOUS at 16:53

## 2021-11-17 RX ADMIN — ALBUTEROL SULFATE 2.5 MG: 2.5 SOLUTION RESPIRATORY (INHALATION) at 06:30

## 2021-11-17 RX ADMIN — Medication 100 MG: at 16:52

## 2021-11-17 RX ADMIN — FENTANYL CITRATE 100 MCG: 0.05 INJECTION, SOLUTION INTRAMUSCULAR; INTRAVENOUS at 16:55

## 2021-11-17 RX ADMIN — SODIUM CHLORIDE 1.3 MCG/KG/HR: 9 INJECTION, SOLUTION INTRAVENOUS at 09:49

## 2021-11-17 RX ADMIN — CLINDAMYCIN PHOSPHATE 600 MG: 600 INJECTION, SOLUTION INTRAVENOUS at 06:23

## 2021-11-17 RX ADMIN — SODIUM CHLORIDE 1.2 MCG/KG/HR: 9 INJECTION, SOLUTION INTRAVENOUS at 12:57

## 2021-11-17 RX ADMIN — SODIUM CHLORIDE, PRESERVATIVE FREE 10 ML: 5 INJECTION INTRAVENOUS at 09:56

## 2021-11-17 RX ADMIN — Medication 75 MCG/HR: at 21:18

## 2021-11-17 RX ADMIN — Medication 75 MCG/HR: at 13:59

## 2021-11-17 RX ADMIN — SODIUM CHLORIDE 1.3 MCG/KG/HR: 9 INJECTION, SOLUTION INTRAVENOUS at 02:23

## 2021-11-17 RX ADMIN — SODIUM CHLORIDE 1.2 MCG/KG/HR: 9 INJECTION, SOLUTION INTRAVENOUS at 17:22

## 2021-11-17 RX ADMIN — ALBUTEROL SULFATE 2.5 MG: 2.5 SOLUTION RESPIRATORY (INHALATION) at 01:10

## 2021-11-17 RX ADMIN — MIDODRINE HYDROCHLORIDE 10 MG: 10 TABLET ORAL at 23:00

## 2021-11-17 ASSESSMENT — PAIN SCALES - GENERAL
PAINLEVEL_OUTOF10: 0

## 2021-11-17 ASSESSMENT — PULMONARY FUNCTION TESTS
PIF_VALUE: 23
PIF_VALUE: 20
PIF_VALUE: 23
PIF_VALUE: 22
PIF_VALUE: 20
PIF_VALUE: 21

## 2021-11-17 NOTE — PROCEDURES
PERCUTANEOUS TRACHEOSTOMY PLACEMENT    Risks and benefits to the procedure were discussed. Alternatives and their risks were discussed as well. INDICATION:respiratory failure    SEDATION:  100 mg Fentanyl, 100 mg Ketamine, 4 mg Ativan, 10 mg Nimbex. EBL:less than 5 ml    COMPLICATIONS:none    PROCEDURE:   After informed consent was obtained, patient received sedation as detailed above. Utilizing contact precautions, patient was prepped using chlorhexidine prep, and draped utilizing sterile gloves, sterile gown, hair neck, and mask. Procedure was performed under direct bronchoscopy. Endotracheal tube was pulled back using blotting technique and area secured. Patient received a total of 8 cc of lidocaine with epinephrine. Utilizing a scalpel, a 1/2  inch incision was made in the skin vertically. Introducer needle was placed into the trachea under direct bronchoscopic visualization. Guidewire was placed through the introducer needle. Introducer needle was withdrawn leaving the guidewire in place. Punch dilator was placed over the guidewire and subsequently removed. Guiding sheath was placed over the guidewire. Expansion dilator was placed onto the guiding sheath. The unit was subsequently advanced into the trachea under direct bronchoscopic visualization until 28 Finnish was visualized within the airway. Dilator was removed leaving the guiding sheath and guidewire in position. A number 7.5 mm Bivona was loaded onto dilator and placed on the guiding sheath and guidewire. The unit was advanced into the trachea under direct supervision via bronchoscopy. Tracheostomy tube was successfully placed. Bronchoscopy was placed into tracheostomy and verified position. Tracheostomy cuff was inflated, and then hooked up to the tracheostomy tube. Protective gauze was placed at the insertion site. Tracheostomy was secured to the skin utilizing  2.0 silk.  There were no immediate issue, and the procedure was completed.   Electronically signed by Osmin Phillips MD on 11/17/2021 at 5:11 PM

## 2021-11-17 NOTE — PROGRESS NOTES
[facility] THERAPY MISSED TREATMENT NOTE  [facility] ICU 4D      Date: 2021  Patient Name: Erin Matias        MRN: [id number]    : 1950  (73 y.o.)    REASON FOR MISSED TREATMENT:  Attempted to see patient at [phone] for dysphagia interventions. NOAM Rao reporting patient maintains with needs for oral intubation; change in medical POC with no identified date for tracheostomy placement. D/t complexity of medical and respiratory status, ST interventions not warranted at this time. Please re-consult as it becomes clinically indicated.      Amber Edmonds M.A., [address] [place] [address]

## 2021-11-17 NOTE — PROGRESS NOTES
[facility] [address]  PHYSICAL THERAPY MISSED TREATMENT NOTE  [facility] ICU 4D    Date: 2021  Patient Name: Alireza Cueva        MRN: [id number]   : 1950  (73 y.o.)  Gender: female                REASON FOR MISSED TREATMENT:  Hold treatment per nursing request.      Per RN, pt is not appropriate for therapy this date. Appears that pt did not have trach placed yesterday. Will check back next available date.     Ayah Higginbotham PT, DPT

## 2021-11-17 NOTE — PROGRESS NOTES
[facility]         Renal Inpatient Follow-Up note         11/17/2021 9:06 AM    Pt Name:   Ana Blue:   1950  Attending:   Galindo Mancini MD    Chief Complaint : Garth Holland is a 73 y.o. female being followed by nephrology for ALVIN/CKD    Interval History :   Patient seen and examined by me. No distress  Intubated and cannot accurately assess history or review of systems  No urine output.   Still on pressors but improving  She is -21 L     Scheduled Medications :    ketamine  100 mg IntraVENous Once    lidocaine 1 % injection  5 mL IntraDERmal Once    insulin lispro  0-6 Units SubCUTAneous Q6H    allopurinol  100 mg Oral Daily    midodrine  10 mg Oral Q8H    clindamycin (CLEOCIN) IV  600 mg IntraVENous Q8H    sodium chloride (Inhalant)  4 mL Nebulization 4 times per day    And    albuterol  2.5 mg Nebulization 4 times per day    phosphorus replacement protocol   Other RX Placeholder    potassium bicarb-citric acid  40 mEq Oral Once    tiotropium-olodaterol  2 puff Inhalation Daily    [Held by provider] aspirin  81 mg Oral Daily    pantoprazole  40 mg IntraVENous QAM    sodium hypochlorite   Irrigation Daily    Riociguat  2.5 mg Oral Q8H    sodium chloride flush  5-40 mL IntraVENous 2 times per day    [Held by provider] FLUoxetine  40 mg Oral Daily    [Held by provider] metoprolol tartrate  12.5 mg Oral BID      sodium chloride      midazolam      fentaNYL 500 mcg in sodium chloride 0.9% 100 ml infusion 100 mcg/hr (11/17/21 0409)    propofol      phenylephrine (KRISTAL-SYNEPHRINE) 50mg/250mL infusion 25 mcg/min (11/16/21 2203)    dextrose      dexmedetomidine 1.3 mcg/kg/hr (11/17/21 9645)    bumetanide 0.1 mg/mL infusion 0.5 mg/hr (11/05/21 0904)    sodium chloride 25 mL (11/15/21 2239)    [Held by provider] sodium chloride 50 mL/hr at 10/28/21 2314       Vitals :  /67   Pulse 60   Temp 98 °F (36.7 °C) (Oral)   Resp 11   Ht 4' 9\" (1.448 m)   Wt 164 lb 3.9 oz (74.5 kg)   SpO2 96%   BMI 35.54 kg/m²     24HR INTAKE/OUTPUT:      Intake/Output Summary (Last 24 hours) at 11/17/2021 0906  Last data filed at 11/17/2021 0700  Gross per 24 hour   Intake 2137.7 ml   Output 135 ml   Net 2002.7 ml     Last 3 weights  Wt Readings from Last 3 Encounters:   11/16/21 164 lb 3.9 oz (74.5 kg)   10/25/21 164 lb (74.4 kg)   09/29/21 160 lb (72.6 kg)           Physical Exam :  General Appearance:  Well developed. No distress  Mouth/Throat:  Oral mucosa moist.  ET tube in place  Neck:  Supple, no JVD  Lungs:  Breath sounds: clear, diminished  Heart[de-identified]  S1,S2 heard  Abdomen:  Soft, non - tender  Musculoskeletal:  Edema - noted but appears better         Last 3 CBC   Recent Labs     11/16/21  0924 11/16/21  1512 11/17/21  0320   WBC 12.9* 12.0* 13.1*   RBC 3.26* 3.21* 3.13*   HGB 9.6* 9.6* 9.4*   HCT 30.5* 29.9* 29.2*   PLT 58* 70* 94*     Last 3 CMP  Recent Labs     11/16/21  0924 11/16/21  1512 11/17/21  0320    137 135   K 4.6 4.6 4.8    104 102   CO2 25 24 24   BUN 21 24* 32*   CREATININE 0.9 1.1 1.5*   CALCIUM 8.2* 8.1* 8.1*   LABALBU 2.7* 2.7* 2.5*   BILITOT 1.2 1.1 0.8             ASSESSMENT / Plan   1. Renal -acute kidney injury, multifactorial etiology which includes hypotension from sepsis, IV contrast exposure on 10/29 and has some hydronephrosis as well . ? Patient's creatinine continued to get worse, but primarily fluid status worsened, did not respond to diuretics so started her on CVVH on 11/5/2021 which was stopped on 11/12/2021 but has to be restarted again due to worsening respiratory status  ? Now she is overall -21 L, on 30% FiO2,. No acute need for renal replacement therapy for now  ? Closely monitor labs and reevaluate in the morning     2. Electrolytes -within normal limits. 3. Mild acidosis improved with CVVH  4. Acute hypercapnic respiratory failure currently ventilator dependent  5.  Pneumonia with pleural effusion and septic shock  6. Hypotension on pressors wean to MAP greater than 65. Also on midodrine  7. Hydronephrosis status post nephrostomy tube placement  8. + ANCA, patient has been having some proteinuria as an outpatient and during my last visit have ordered all the serologic work-up , which the patient has done prior to getting admitted to the hospital however the ANCA was not done at that time. She may be having some renal pathology going on , but I doubt it has anything to do with acute renal dysfunction during this admission. However she has received steroids. Once clinically stable might consider a renal biopsy. 9. Meds reviewed and discussed with  nursing staff. Dr. Zain Mccoy MD, M,D.  [facility].

## 2021-11-17 NOTE — PROGRESS NOTES
CRITICAL CARE PROGRESS NOTE      Patient:  Chetna Gramajo    Unit/Bed:4D-09/009-A  YOB: 1950  MRN: [id number]   PCP: Essence Westbrook MD  Date of Admission: 10/27/2021  Chief Complaint:- Shortness of breath    Assessment and Plan:    1. Acute combined hypercapnic and hypoxic respiratory failure: Secondary to severe pneumonia with left-sided pleural effusion and repeated mucous plugging. Intubated 10/27/21. Extubated following successful SBT on 11/9/21. Chest x-ray today shows combination of left lung atelectasis most likely secondary to mucous plugging in addition to pleural effusion, she is a status post bronchoscopy with mucous plug removal from the left mainstem on 11/13. Failed HFNC and was reintubated on 11/15/21. Family consented for tracheostomy at that time. 2. Severe bilateral multiorganism pneumonia: PCR positive Staph w/ MECA gene and adenovirus consistent w/ MRSA colonization. Culture growing MSSA. Repeat culture 11/5/21 shows coag positive staph completed 14 days Vancomycin (started 10/28/21) and completed 5 days Rocephin Clindamycin started for coverage of PVL as patient clinically worsening following 14 days Vancomycin for MRSA coverage. Underwent bronchoscopy for left mainstem mucus plug noted CXR on 11/11/21. Switched to Clindamycin for coverage of PVL received 8 days therapy (started 11/11/21). PNA panel from 11/15/21 negative. Cultures pending  3. Septic Shock: 2/2 severe PNA now concern for necrotizing wound on sacrum. Initial CI 6.3 w/ NE precludes concern for septic shock NE d/c 2/2 worsening afib RVR. Currently on  Phenylephrine for pressor support  4. Stage II ALVIN on CKD 3: Suspect post renal etiology 2/2 staghorn calculi. Nephrology following. CRRT initiated 11/5/21 will continue for UF removal that was continued until 11/12, the patient is off CRRT. 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  ANCA associated Abs show elevated anti MPO elevated 416 and serine proteinase 3 IgG WNL. Will need Bx for confirmation. Mitochondrial Abs pending, Anti-DS-DNA negative, Anti histone negative, Anti Smith,and  Anti-Savana WNL, GBM antibodies negative, C3/C4 levels normal, elevated kappa/lambda free light chains with normal ratio. No plans per Bx per nephrology. Fluid collection noted on left renal unlikely abscess as patient remains afebrile. On CVVH PER nephrology  5. 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 Probes to bone, necrosis noted around wound site. Suspicion for worsening infection. Will have discussion with family. CT abdomen and pelvis. Requested general surgery evaluation. Likely a poor candidate for surgery given multiple co-morbidities, malnutrition and poor wound healing. Poor prognosis  6. ANCA associated Vasculitis: workup per above. Will need Renal Bx for confirmation once stable. Discussed case w/ Nephrology. Completed pulse dose steroids starting on 11/8/21 w/  10mg/kg methylprednisolone for 3 days. SSI started for coverage. Not candidate for Rituxin/TPE 2/2 existing infection. No diffuse alveolar hemorrhage. 7. Hydronephrosis 2/2 Left-sided staghorn calculi: Urology following. Percutaneous drainage tube 11/2/21 IR. Low output following tube placement. Increased flow on CBI per urology recs. Still flushing clots. Perinephric stranding noted on CT abdomen and pelvis w/ air fluid region discontiguous with region of nephrostomy tube insertion raised initial concern for abscess (<3 cm) vs emphysematous pyelonephritis when discussed with radiologist. Received 5 days empiric therapy w/ cefepime (started 11/5/21). On Vancomycin per above. Culture of nephrostomy tube aspirate from 11/5/21 shows staph epidermidis (vancomycin sensitive). 8. Acute blood loss anemia: Suspect consumptive process.  Leading differential , smear, reticulocytes, LDH, Haptoglobin, MERCEDES, B12/folate WNL. suspect 2/2 hemolysis on CRRT. PRBC x1 transfused 10/31/21, 11/4/21, 11/5/21, x2 11/6/21, x3 11/8/21, x3 11/15/21. Heparin stopped (dialysis dose still running). Holding ASA. 9. Thrombocytopenia: suspect consumptive process. Workup per above. Not heparin per above. 10. Hematuria: s/p perc tube placement. CBI per above. Stopped heparin, holding ASA. 11. Suspected Dysphagia: 2/2 prolonged intubation. SLP eval pending  12. Bilateral Pleural Effusion:   Associated volume overload in context of renal failure. Interval enlargement noted on CT 11/1/21. Currently on CVVH for effusions. Plan for chest tube insertion if no improvement  13. Suspected COPD: current every day smoker. Obstructive process noted on flow volume loop on ventilator. Started empiric therapy with Stiolto. Recommend formal PFT once improved  14. PAH/chronic RV failure NYHA II: EF 55 to 60% G2 DD TTE 5/4/2021. RHC showed [place] 30 with elevated PCWP. Treated w/ Riociguat. 15. Normocytic Anemia: 2/2 hematuria s/p perc tube placement w/ Iron studies consistent w/  MERCEDES vs Anemia chronic disease. B12 WNL, Folate low, Absreticulocyte index 1.9% Soluable transferrin pending. Calculated Iron deficit 827mg. Will start Venofer replace folate once ABx stopped  16. Cholelithiasis: w/ dilated CBD. Noted on 10/30/2021 US liver/GB. GI following. Does not suspect choledocolithiasis at this time. May consider HIDA scan once stable   17. Leukocytosis: 2/2 to pulse dose steroids per above  18. Severe malnutrition: Pre-albumin 14.4 w/ notable cachexia. 19. Chronic tobacco use:   cessation  20. Mild AS: Noted on previous TTE not appreciated on repeat imaging  21. Gout:  W/o exacerbation. Continue allopurinol  22. BARBER: non compliant w/ CPAP  23. New onset atrial fibrillation with rapid ventricular response (resolved): now in NSR s/p DCCV 11/3/21 Stopped Heparin per hematuria w/ anemia requiring transfusion & and now in NSR. Stopped Amiodarone 11/8/21. Holding metoprolol 2/2 hypotension  24. Hyperkalemia (resolved): 2/2 ALVIN  25. Hyponatremia (resolved): Suspect 2/2 renal failure. 26. Nutrition: on TF @30 ml       INITIAL H AND P AND ICU COURSE:  71F admitted to [facility] 10/27/21 w/ SOB. Current smoker w/ PMH PAH grp 3, HFpEF, stage III sacral ulcer s/p wound ostomy 9/21. Patient sister reports SpO2 51% at home and was brought to ED where ABG showed pH 7.19, PCO2 80, PO2 134. She required emergent intubation and was transferred to ICU. Workup revealed left sided pleural effusion and underwent US guided thoracentesis w/ 1100 cc removed consistent w/ MRSA/adenovirus. Patient was noted to be in afib/RVR and was placed on amio, heparin drip. Patient was also noted to have normocytic anemia and received 1 unit PRBC 10/31/21. CT abdomen revealed CBD dilation w/ cholelithiasis. 11/1/2021: Plan for nephrostomy tubes today. Hemoglobin 7.7 s/p 1 unit PRBC yesterday. Weaned to 4 mics Levophed. 11/2/2021: Patient resumed back on prior dose of vancomycin. Low urine output w/ increasing pressor requirements today    11/3/2021: Will attempt SBT if able to wean today. Increased Amio drip 2/2 worsening tachycardia. Continued hematuria w/ low urine output. Cr stable. Will evaluate UTI following perc tube placement. Rise in WBC noted. Culture pending    11/4/2021: Cardioversion on 11/3/2021. Now and NSR. CVP 29 this a.m. Suspect drop in CI 2/2 to stopping levophed. Diffuse edema following transfusion overnight. Given one-time dose of 2 mg Bumex and albumin. SBT this AM    11/5/2021: Failed SBT yesterday. Continued low urine output. Trial of Bumex today. Will proceed with dialysis if fails to improve. 11/6/2021: Started on CRRT, continues to have bloody drainage of nephrostomy tube given 1 unit PRBC, cefipime emperically pending cultures of nephrostomy tube fluid and repeat resp cultures.      11/7/21: Patient remains clinically well on exam. We'll continue medical of UF with CRRT. On empiric cefepime for coverage of perinephric abscess noted on CT abdomen and pelvis November 4, 2021. White count trending down. Will attempt spontaneous breathing trial in a.m.    11/8/21: Transfused 1x PRBC this AM. Anemia workup pending. WBC trending down. Patient ventilator settings this AM preclude SBT. Volume overloaded on exam. UF increased to 125 cc/hr this AM. Will attempt to wean vent settings further further. Coag neg staph growing on nephrostomy tube aspirate. Suspect contamination. Continue existing ABx    11/9/21: Pulse dose steroids initiated overnight. Started on SSI. Urine output now improving 75cc/hr. Transfused 3 units PRBCs yesterday for Improve DO2. MERCEDES noted w/ 875 mg deficit. Will replace once steroids/ABx completed. 11/10/21 Extubated overnight. SLP eval today. Continue BiPAP while asleep w/ transition to NC while awake as tolerated. No tachypnea. Day 3 pulse dose steroids. Continued bloody drainage from nephrostomy tube. Holding [facility]     11/11/21 Worsening bilateral infiltrates with SpO2 <90% on HFNC. Continued pulmonary hygiene w/ nebulized hypertonic saline, acapella and breathing treatments in addition to deep suctioning. Continued on Vancomycin for MRSA PNA. Plan for family meeting this AM to discuss goals of care. 11/12/21 Follow-up discussion from family meeting. Patient nodded agreement with full code status including reintubation leading to tracheostomy and PEG placement and continued hemodialysis if needed. Patient nodded agreement and understanding with these decisions. CXR yesterday evening did show left mainstem mucus plugging with cutoff sign and absent breath sounds and did undergo emergent bronchoscopy overnight on 11/12/21. Diminished breath sounds and worsening respiratory status this AM concerning for continued plugging. Stat chest X-ray ordered for confirmation.      11/13/21: Respiratory status continues to decline, she is requiring BiPAP with FiO2 of 70%. Chest x-ray shows complete opacification of the left lung secondary to mucous plugging, also the patient has left-sided pleural effusion. Plan for bronchoscopy. 11/15/2021: Family meeting today per event note. Reintubated today after failed BiPAP    11/16/2021: will dc abx tomorrow. PNA panel negative. Plan for tracheostomy time permitting today. Weaning phenylephrine as tolerated. 11/17/2021: Necrotic Stage IV pressure ulcer probing to bone noted this AM. CT abdomen and pelvis pending to evaluate for abscess. Will discuss with family today when available. Past Medical History:  Per HPI. Family History:  DM in father and sister. Social History: chronic smoker.     ROS   Patient reports fatigue,  A 12 point review of systems was otherwise negative    Scheduled Meds:   ketamine  100 mg IntraVENous Once    lidocaine 1 % injection  5 mL IntraDERmal Once    insulin lispro  0-6 Units SubCUTAneous Q6H    allopurinol  100 mg Oral Daily    midodrine  10 mg Oral Q8H    clindamycin (CLEOCIN) IV  600 mg IntraVENous Q8H    sodium chloride (Inhalant)  4 mL Nebulization 4 times per day    And    albuterol  2.5 mg Nebulization 4 times per day    phosphorus replacement protocol   Other RX Placeholder    potassium bicarb-citric acid  40 mEq Oral Once    tiotropium-olodaterol  2 puff Inhalation Daily    [Held by provider] aspirin  81 mg Oral Daily    pantoprazole  40 mg IntraVENous QAM    sodium hypochlorite   Irrigation Daily    Riociguat  2.5 mg Oral Q8H    sodium chloride flush  5-40 mL IntraVENous 2 times per day    [Held by provider] FLUoxetine  40 mg Oral Daily    [Held by provider] metoprolol tartrate  12.5 mg Oral BID     Continuous Infusions:   sodium chloride      midazolam      fentaNYL 500 mcg in sodium chloride 0.9% 100 ml infusion 100 mcg/hr (11/17/21 0409)    propofol      prismaSATE BGK 4/2.5 500 mL/hr at 11/15/21 1305    prismaSol BGK 4/2.5 500 mL/hr at 11/15/21 1305    prismaSol BGK 4/2.5 500 mL/hr at 11/15/21 1309    phenylephrine (KRISTAL-SYNEPHRINE) 50mg/250mL infusion 25 mcg/min (11/16/21 2203)    dextrose      dexmedetomidine 1.3 mcg/kg/hr (11/17/21 0223)    bumetanide 0.1 mg/mL infusion 0.5 mg/hr (11/05/21 0904)    sodium chloride 25 mL (11/15/21 2239)    [Held by provider] sodium chloride 50 mL/hr at 10/28/21 2314       PHYSICAL EXAMINATION:  T:  98.  P: 60 RR:  11. B/P: 121/67 FiO2: 40. O2 Sat:  96%. I/O: [id number] net -1749  Body mass index is 35.54 kg/m². GCS:  10  PC: 15 PEEP: 6: TV: 380: RRTotal: 18: General: Acute on chronically ill-appearing elderly white female  HEENT: Temporal wasting appreciated. Normocephalic and atraumatic. No scleral icterus. PERR  Neck: supple. No Thyromegaly. Left subclavian dialysis catheter   Lungs: Mild rhonchi appreciated in lower lung fields (interval improvement) Absent left lower lobe breath sounds. No retractions  Cardiac: RRR. No JVD. Abdomen: soft. Nontender. ,npehrostomy tube with bloody drainage  Extremities:  +1 pitting edema x4. (interval improvement noted) No clubbing, cyanosis   Vasculature: capillary refill < 3 seconds. Palpable dorsalis pedis pulses. Skin:  warm and dry. Psych: Alert and oriented to person place and time, affect appropriate  Lymph:  No supraclavicular adenopathy. Neurologic:  No focal deficit. No seizures. Data: (All radiographs, tracings, PFTs, and imaging are personally viewed and interpreted unless otherwise noted).     CMP: Sodium 135 potassium 4.8 chloride 102 bicarb 24 BUN/creatinine 32/1.5 anion gap 9 ionized calcium 1.2 GFR 34 magnesium 2.0 glucose 125 calcium 8.1 phosphorus 3.3 total protein 4.6 albumin 2.5 alk phos 92 ALT/AST 12/12 total bilirubin 0.8    CBC WBC 13.1 H&H 9.4/22 platelets 94   SARS-CoV-2 NAAT negative on 10/27/2021   Telemetry shows NSR   Chest x-ray 10/31/2021 demonstrated diffuse bilateral patchy opacities with left-sided pleural effusion and cardiomegaly   Respiratory culture  11/5/21 pending   Pneumonia panel collected 10/27/2021 demonstrates staph aureus. MEC-A gene and adenovirus   CT chest on 11/1/2021 demonstrates bilateral pleural effusions with interval enlargement   CXR 11/3/2021 demonstrates worsening pulmonary congestion with bilateral effusions   UA shows bacteria w/ moderate white cells   CT Abdomen and pelvis 11/5/21 showed perinephric stranding with left-sided hypodense nodule with air-fluid level concerning for abscess. Diffuse ascites and anasarca noted.  CXR November 7, 2021 shows bilateral pulmonary edema with worsening right-sided infiltrate   CXR November 9, 2021 shows improvement in bilateral pulmonary edema with worsening left sided infiltrate    Repeat CXR 11/12/21 pending at time of evaluation.  Chest x-ray on 11/17/2021 demonstrates persistent bilateral pleural effusions and slight improvement in interstitial disease noted bilaterally         Meets Continued ICU Level Care Criteria:    [x] Yes   [] No - Transfer Planned to listed location:  [] HOSPITALIST CONTACTED- DR       Electronically signed by Tom Dixon DO PGY-2 on 11/17/2021 at 5:41 AM    Patient seen by me. Case discussed with resident physician. Patient with ANCA associated vasculitis requiring dialysis and mechanical ventilator support. He ostomy tube placed 11/17/2021. Italicized font represents changes to the note made by me. CC time 35 minutes. Time was discontiguous. Time does not include procedures. Time does include my direct assessment of the patient and coordination of care.   Electronically signed by Marty Castrejon MD on 11/17/2021 at 5:10 PM

## 2021-11-17 NOTE — FLOWSHEET NOTE
1- Dr Oscar Hastings starting tracheostomy procedure. 1700- trach placed and secured by Dr Oscar Hastings.

## 2021-11-17 NOTE — SIGNIFICANT EVENT
Discussion with family regarding CT findings and deep wound infection with possible osteomyelitis. Family is aware of the gradual overall decline in patients status and multiple comorbidities including recent severe PNA requiring intubation, acute renal injury requiring dialysis, shock on pressors, ANCA associated vasculitis, progression of chronic anemia and severe malnutrition. Teetee Gonzalez and her  understand that patient is not a likely candidate for surgery, however, we will consult for evaluation.  Will continue with medical management for now

## 2021-11-17 NOTE — CONSULTS
Patient seen and evaluated and discussed with Dr. Dusty Dean . Has been in the hospital for about 2 weeks per his report with respiratory failure. Plan is for tracheostomy. Has a chronic stage IV decubitus with wound. She has some pressure changes but nothing which requires overt debridement at this point. Recommend ID consultation as CT scan shows some question of bony involvement regarding treatment. I do not feel she would tolerate any major surgical intervention at this time local wound care recommend wound and ostomy. Please reconsult surgery as needed. Would be happy to reevaluate in the future. I do not think any debridement at this point is warranted of the soft tissues and would only lead to a larger tissue defect.

## 2021-11-17 NOTE — PROGRESS NOTES
Assisted Dr. Ranjeet Santoro with a percutaneous tracheostomy insertion. A time out was performed. Respiratory care practitioners assisting was Darien Baker. The type and size of tracheostomy tube was 7.5 Bivona. After the procedure vitals were assessed. Vital signs were reviewed and were stable after the procedure (see flow sheet for vitals). The patient was returned to the ventilator at settings of:Pressure Control 32/8 , respiratory rate 20 breaths per minute,  PEEP of 8 cmH2O, and FiO2 of 100 . Patient tolerated the procedure well. Lot number of tracheostomy inserted [id number]     ICU disposable  mobile scope  was used. Assisted Dr. Ranjeet Santoro with bronchoscopy procedure. Bronchial washings were not obtained. Patient tolerated the procedure well. The bronchoscope was disposed of in a red bag and placed in dirty utility room.

## 2021-11-17 NOTE — PROGRESS NOTES
[address] THERAPY MISSED TREATMENT NOTE  [facility] ICU 4D  4D-009-A      Date: 2021  Patient Name: Jean Pierre Graff        CSN: [id number]   : 1950  (73 y.o.)  Gender: female   Referring Practitioner: Laly Kelley DO  Diagnosis: Acute respiratory failure         REASON FOR MISSED TREATMENT: Per RN, pt is not appropriate for therapy this date. Appears that pt did not have trach placed yesterday. Will check back next available date.

## 2021-11-18 ENCOUNTER — APPOINTMENT (OUTPATIENT)
Dept: GENERAL RADIOLOGY | Age: 71
DRG: 004 | End: 2021-11-18
Payer: MEDICARE

## 2021-11-18 LAB
ABSOLUTE RETIC #: 37 THOU/MM3 (ref 20–115)
ALBUMIN SERPL-MCNC: 2.5 G/DL (ref 3.5–5.1)
ALP BLD-CCNC: 104 U/L (ref 38–126)
ALT SERPL-CCNC: 11 U/L (ref 11–66)
ANION GAP SERPL CALCULATED.3IONS-SCNC: 9 MEQ/L (ref 8–16)
AST SERPL-CCNC: 13 U/L (ref 5–40)
BASOPHILS # BLD: 0.5 %
BASOPHILS ABSOLUTE: 0.1 THOU/MM3 (ref 0–0.1)
BILIRUB SERPL-MCNC: 0.5 MG/DL (ref 0.3–1.2)
BUN BLDV-MCNC: 39 MG/DL (ref 7–22)
CALCIUM IONIZED: 1.17 MMOL/L (ref 1.12–1.32)
CALCIUM SERPL-MCNC: 7.9 MG/DL (ref 8.5–10.5)
CHLORIDE BLD-SCNC: 102 MEQ/L (ref 98–111)
CO2: 22 MEQ/L (ref 23–33)
CREAT SERPL-MCNC: 2 MG/DL (ref 0.4–1.2)
EOSINOPHIL # BLD: 1.8 %
EOSINOPHILS ABSOLUTE: 0.2 THOU/MM3 (ref 0–0.4)
ERYTHROCYTE [DISTWIDTH] IN BLOOD BY AUTOMATED COUNT: 18.3 % (ref 11.5–14.5)
ERYTHROCYTE [DISTWIDTH] IN BLOOD BY AUTOMATED COUNT: 63.3 FL (ref 35–45)
GFR SERPL CREATININE-BSD FRML MDRD: 25 ML/MIN/1.73M2
GLUCOSE BLD-MCNC: 108 MG/DL (ref 70–108)
GLUCOSE BLD-MCNC: 88 MG/DL (ref 70–108)
GLUCOSE BLD-MCNC: 89 MG/DL (ref 70–108)
GLUCOSE BLD-MCNC: 92 MG/DL (ref 70–108)
GLUCOSE BLD-MCNC: 95 MG/DL (ref 70–108)
HCT VFR BLD CALC: 29.2 % (ref 37–47)
HEMOGLOBIN: 9.1 GM/DL (ref 12–16)
IMMATURE GRANS (ABS): 0.1 THOU/MM3 (ref 0–0.07)
IMMATURE GRANULOCYTES: 0.9 %
IMMATURE RETIC FRACT: 14.3 % (ref 3–15.9)
INR BLD: 1.02 (ref 0.85–1.13)
LYMPHOCYTES # BLD: 6.2 %
LYMPHOCYTES ABSOLUTE: 0.7 THOU/MM3 (ref 1–4.8)
MAGNESIUM: 2.1 MG/DL (ref 1.6–2.4)
MCH RBC QN AUTO: 29.7 PG (ref 26–33)
MCHC RBC AUTO-ENTMCNC: 31.2 GM/DL (ref 32.2–35.5)
MCV RBC AUTO: 95.4 FL (ref 81–99)
MONOCYTES # BLD: 6.2 %
MONOCYTES ABSOLUTE: 0.7 THOU/MM3 (ref 0.4–1.3)
NUCLEATED RED BLOOD CELLS: 0 /100 WBC
PHOSPHORUS: 4.9 MG/DL (ref 2.4–4.7)
PLATELET # BLD: 118 THOU/MM3 (ref 130–400)
PMV BLD AUTO: 9.9 FL (ref 9.4–12.4)
POTASSIUM SERPL-SCNC: 5.2 MEQ/L (ref 3.5–5.2)
RBC # BLD: 3.06 MILL/MM3 (ref 4.2–5.4)
RETIC HEMOGLOBIN: 33 PG (ref 28.2–35.7)
RETICULOCYTE ABSOLUTE COUNT: 1.2 % (ref 0.5–2)
SEG NEUTROPHILS: 84.4 %
SEGMENTED NEUTROPHILS ABSOLUTE COUNT: 9.4 THOU/MM3 (ref 1.8–7.7)
SODIUM BLD-SCNC: 133 MEQ/L (ref 135–145)
TOTAL PROTEIN: 4.5 G/DL (ref 6.1–8)
VANCOMYCIN RANDOM: 6.3 UG/ML (ref 0.1–39.9)
WBC # BLD: 11.1 THOU/MM3 (ref 4.8–10.8)

## 2021-11-18 PROCEDURE — 99233 SBSQ HOSP IP/OBS HIGH 50: CPT | Performed by: INTERNAL MEDICINE

## 2021-11-18 PROCEDURE — 6360000002 HC RX W HCPCS: Performed by: NURSE PRACTITIONER

## 2021-11-18 PROCEDURE — 2580000003 HC RX 258: Performed by: STUDENT IN AN ORGANIZED HEALTH CARE EDUCATION/TRAINING PROGRAM

## 2021-11-18 PROCEDURE — 85025 COMPLETE CBC W/AUTO DIFF WBC: CPT

## 2021-11-18 PROCEDURE — 6370000000 HC RX 637 (ALT 250 FOR IP): Performed by: INTERNAL MEDICINE

## 2021-11-18 PROCEDURE — 84100 ASSAY OF PHOSPHORUS: CPT

## 2021-11-18 PROCEDURE — 71045 X-RAY EXAM CHEST 1 VIEW: CPT

## 2021-11-18 PROCEDURE — 97110 THERAPEUTIC EXERCISES: CPT

## 2021-11-18 PROCEDURE — 94761 N-INVAS EAR/PLS OXIMETRY MLT: CPT

## 2021-11-18 PROCEDURE — 2580000003 HC RX 258: Performed by: EMERGENCY MEDICINE

## 2021-11-18 PROCEDURE — 99291 CRITICAL CARE FIRST HOUR: CPT | Performed by: INTERNAL MEDICINE

## 2021-11-18 PROCEDURE — 6370000000 HC RX 637 (ALT 250 FOR IP): Performed by: NURSE PRACTITIONER

## 2021-11-18 PROCEDURE — 2500000003 HC RX 250 WO HCPCS: Performed by: STUDENT IN AN ORGANIZED HEALTH CARE EDUCATION/TRAINING PROGRAM

## 2021-11-18 PROCEDURE — 94640 AIRWAY INHALATION TREATMENT: CPT

## 2021-11-18 PROCEDURE — 6370000000 HC RX 637 (ALT 250 FOR IP): Performed by: STUDENT IN AN ORGANIZED HEALTH CARE EDUCATION/TRAINING PROGRAM

## 2021-11-18 PROCEDURE — 6360000002 HC RX W HCPCS: Performed by: INTERNAL MEDICINE

## 2021-11-18 PROCEDURE — 94003 VENT MGMT INPAT SUBQ DAY: CPT

## 2021-11-18 PROCEDURE — 6360000002 HC RX W HCPCS: Performed by: STUDENT IN AN ORGANIZED HEALTH CARE EDUCATION/TRAINING PROGRAM

## 2021-11-18 PROCEDURE — 80202 ASSAY OF VANCOMYCIN: CPT

## 2021-11-18 PROCEDURE — 99232 SBSQ HOSP IP/OBS MODERATE 35: CPT | Performed by: NURSE PRACTITIONER

## 2021-11-18 PROCEDURE — 97163 PT EVAL HIGH COMPLEX 45 MIN: CPT

## 2021-11-18 PROCEDURE — 2700000000 HC OXYGEN THERAPY PER DAY

## 2021-11-18 PROCEDURE — 2000000000 HC ICU R&B

## 2021-11-18 PROCEDURE — 6360000002 HC RX W HCPCS

## 2021-11-18 PROCEDURE — 85046 RETICYTE/HGB CONCENTRATE: CPT

## 2021-11-18 PROCEDURE — 82948 REAGENT STRIP/BLOOD GLUCOSE: CPT

## 2021-11-18 PROCEDURE — 83735 ASSAY OF MAGNESIUM: CPT

## 2021-11-18 PROCEDURE — C9113 INJ PANTOPRAZOLE SODIUM, VIA: HCPCS | Performed by: NURSE PRACTITIONER

## 2021-11-18 PROCEDURE — 80053 COMPREHEN METABOLIC PANEL: CPT

## 2021-11-18 PROCEDURE — 2580000003 HC RX 258: Performed by: INTERNAL MEDICINE

## 2021-11-18 PROCEDURE — 97530 THERAPEUTIC ACTIVITIES: CPT

## 2021-11-18 PROCEDURE — 36415 COLL VENOUS BLD VENIPUNCTURE: CPT

## 2021-11-18 PROCEDURE — 36592 COLLECT BLOOD FROM PICC: CPT

## 2021-11-18 PROCEDURE — 82330 ASSAY OF CALCIUM: CPT

## 2021-11-18 PROCEDURE — 85610 PROTHROMBIN TIME: CPT

## 2021-11-18 RX ORDER — ONDANSETRON 2 MG/ML
4 INJECTION INTRAMUSCULAR; INTRAVENOUS EVERY 6 HOURS PRN
Status: DISCONTINUED | OUTPATIENT
Start: 2021-11-18 | End: 2021-12-01 | Stop reason: HOSPADM

## 2021-11-18 RX ORDER — LACTOBACILLUS RHAMNOSUS GG 10B CELL
1 CAPSULE ORAL
Status: DISCONTINUED | OUTPATIENT
Start: 2021-11-18 | End: 2021-12-01 | Stop reason: HOSPADM

## 2021-11-18 RX ORDER — CHLORHEXIDINE GLUCONATE 0.12 MG/ML
15 RINSE ORAL 2 TIMES DAILY
Status: DISCONTINUED | OUTPATIENT
Start: 2021-11-18 | End: 2021-12-01 | Stop reason: HOSPADM

## 2021-11-18 RX ORDER — ONDANSETRON 2 MG/ML
INJECTION INTRAMUSCULAR; INTRAVENOUS
Status: COMPLETED
Start: 2021-11-18 | End: 2021-11-18

## 2021-11-18 RX ADMIN — Medication 1 CAPSULE: at 11:03

## 2021-11-18 RX ADMIN — TIOTROPIUM BROMIDE AND OLODATEROL 2 PUFF: 3.124; 2.736 SPRAY, METERED RESPIRATORY (INHALATION) at 08:41

## 2021-11-18 RX ADMIN — MIDODRINE HYDROCHLORIDE 10 MG: 10 TABLET ORAL at 15:16

## 2021-11-18 RX ADMIN — PIPERACILLIN AND TAZOBACTAM 3375 MG: 3; .375 INJECTION, POWDER, LYOPHILIZED, FOR SOLUTION INTRAVENOUS at 10:57

## 2021-11-18 RX ADMIN — ALBUTEROL SULFATE 2.5 MG: 2.5 SOLUTION RESPIRATORY (INHALATION) at 01:03

## 2021-11-18 RX ADMIN — ONDANSETRON 4 MG: 2 INJECTION INTRAMUSCULAR; INTRAVENOUS at 06:19

## 2021-11-18 RX ADMIN — SODIUM CHLORIDE 0.2 MCG/KG/HR: 9 INJECTION, SOLUTION INTRAVENOUS at 07:12

## 2021-11-18 RX ADMIN — SODIUM CHLORIDE, PRESERVATIVE FREE 10 ML: 5 INJECTION INTRAVENOUS at 21:20

## 2021-11-18 RX ADMIN — PHENYLEPHRINE HYDROCHLORIDE 90 MCG/MIN: 10 INJECTION INTRAVENOUS at 19:55

## 2021-11-18 RX ADMIN — PANTOPRAZOLE SODIUM 40 MG: 40 INJECTION, POWDER, FOR SOLUTION INTRAVENOUS at 10:46

## 2021-11-18 RX ADMIN — SODIUM CHLORIDE 0.3 MCG/KG/HR: 9 INJECTION, SOLUTION INTRAVENOUS at 13:41

## 2021-11-18 RX ADMIN — SODIUM CHLORIDE SOLN NEBU 3% 4 ML: 3 NEBU SOLN at 01:03

## 2021-11-18 RX ADMIN — ALBUTEROL SULFATE 2.5 MG: 2.5 SOLUTION RESPIRATORY (INHALATION) at 06:34

## 2021-11-18 RX ADMIN — ALBUTEROL SULFATE 2.5 MG: 2.5 SOLUTION RESPIRATORY (INHALATION) at 12:39

## 2021-11-18 RX ADMIN — VANCOMYCIN HYDROCHLORIDE 1000 MG: 1 INJECTION, POWDER, LYOPHILIZED, FOR SOLUTION INTRAVENOUS at 18:09

## 2021-11-18 RX ADMIN — CHLORHEXIDINE GLUCONATE 15 ML: 1.2 RINSE ORAL at 21:19

## 2021-11-18 RX ADMIN — ALLOPURINOL 100 MG: 100 TABLET ORAL at 10:46

## 2021-11-18 RX ADMIN — SODIUM HYPOCHLORITE: 1.25 SOLUTION TOPICAL at 11:02

## 2021-11-18 RX ADMIN — ALBUTEROL SULFATE 2.5 MG: 2.5 SOLUTION RESPIRATORY (INHALATION) at 17:58

## 2021-11-18 RX ADMIN — SODIUM CHLORIDE SOLN NEBU 3% 4 ML: 3 NEBU SOLN at 12:39

## 2021-11-18 RX ADMIN — SODIUM CHLORIDE, PRESERVATIVE FREE 10 ML: 5 INJECTION INTRAVENOUS at 10:47

## 2021-11-18 RX ADMIN — SODIUM CHLORIDE SOLN NEBU 3% 4 ML: 3 NEBU SOLN at 17:58

## 2021-11-18 RX ADMIN — PIPERACILLIN AND TAZOBACTAM 3375 MG: 3; .375 INJECTION, POWDER, LYOPHILIZED, FOR SOLUTION INTRAVENOUS at 21:17

## 2021-11-18 RX ADMIN — PHENYLEPHRINE HYDROCHLORIDE 90 MCG/MIN: 10 INJECTION INTRAVENOUS at 08:46

## 2021-11-18 ASSESSMENT — PAIN SCALES - GENERAL
PAINLEVEL_OUTOF10: 0

## 2021-11-18 ASSESSMENT — PULMONARY FUNCTION TESTS
PIF_VALUE: 16
PIF_VALUE: 20
PIF_VALUE: 24
PIF_VALUE: 23
PIF_VALUE: 24
PIF_VALUE: 16

## 2021-11-18 NOTE — FLOWSHEET NOTE
Patrick Harding is in bed on ICU 4D. She is not responsive and prayer was had from the doorway. 11/17/21 1310   Encounter Summary   Services provided to: Patient   Referral/Consult From: [address] [place] [address] Parent   [facility]   Continue Visiting Yes  (11/17 NR)   Complexity of Encounter Low   Length of Encounter 15 minutes   Spiritual/Oriental orthodox   Type Spiritual support   Care Plan:  Continue spiritual and emotional care for patient and family. Including prayers.

## 2021-11-18 NOTE — CARE COORDINATION
11/18/21, 1:12 PM EST    DISCHARGE ON GOING EVALUATION    [facility] day: 22  Location: -09/009-A Reason for admit: Acute respiratory failure ([address] [J96.00]  Flash pulmonary edema ([address] [J81.0]  Acute respiratory failure with hypercapnia ([address] [J96.02]   Procedure:   10/27 CXR: Near complete opacification of the left hemithorax with very little aerated lung visualized in the left upper lobe. Small right pleural effusion and right lower lobe consolidation obscures visualization of the right hemidiaphragm pulmonary vascular congestion is observed  10/27 Intubated  10/27 CVC right subclavian  10/27 IR: Left thoracentesis with 1.1L removed  10/28 EEG: no definitive evidence of epileptiform activity appreciated. 10/29 Cardiac Cath with SGC and michelle placed - removed 11/4  10/31 CT Abd/pelvis:   1. Bilateral pleural effusions and abnormal densities in the right and left lower lobes consistent with inflammatory process. 2. Cardiomegaly. Small pericardial effusion. 3. Small amount of fluid surrounding the liver. 4. Gallstone. Increased attenuation in the gallbladder. No pericholecystic fluid or biliary dilatation. 5. Large staghorn calculus in the left kidney. Severe left-sided hydronephrosis and hydroureter. Increased density in the left perinephric fat consistent with inflammatory process. 6. Atherosclerotic calcification in the abdominal aorta, iliac and femoral arteries. 7. Paidlla catheter within the bladder. 8. Lumbar spondylosis. 9. Increased density in the skin and subcutaneous soft tissues suggestive of edema or anasarca. 10. Enteric tube.  Troy-Kasia catheter in place, seen better on plain radiographs      11/1 CT Chest: Moderate bilateral pleural effusions have increased in size in the interval with associated adjacent atelectasis; Persistent pulmonary vascular congestion/edema with cardiomegaly; Decreased pericardial effusion  11/1 Left Nephrostomy tube placed in IR  11/3 Cardioverted x1 to NSR  11/4 CT Abd/pelvis:   1. Interval percutaneous left nephrostomy tube placement with small amount of blood in the renal pelvis about the nephrostomy tube. 2. Hypodense lesion in the anterior aspect of the interpolar region of the left kidney with small focus of air at the margin. Developing abscess can't be excluded. 3. Scattered foci of air elsewhere in the kidney likely sequelae of nephrostomy tube placement. 4. Mildly worsening anasarca with persistent small to moderate ascites and moderate pleural effusions which may be cardiogenic given cardiomegaly. 5. Worsening opacities adjacent to the pleural effusions as evidence for worsening atelectasis or infiltrates. 6. Small hemopericardium, stable compared to prior exam.   7. Stable retroperitoneal periaortic lymphadenopathy at the level of the left kidney      11/5 TESSIO left subclavian  11/5 CRRT initiated  11/9 Extubated  11/9 CT Abd/pelvis:   1. Very limited evaluation. Large bilateral pleural effusions and bilateral airspace infiltrates. #2 subcutaneous edema throughout the abdomen and pelvis indicating presence of third spacing. 3. Presence or absence of a perinephric abscess cannot be confirmed      11/11 Bronch: Left main stem mucous plug; removed. 11/12 CRRT stopped  11/13 CRRT restarted  11/13 Bronch: Left main stem mucous plug removed; left lobes with edematous mucosa  11/14 CBI stopped  11/15 Reintubated  11/16 CRRT stopped  11/17 CT Abd/pelvis: Left nephrostomy tube with perinephric stranding and minimal mesenteric edema/ascites. No convincing evidence of an abscess collection;   11/17 Trach placed: Bivona 7.5 mm  11/18 CXR:   1. Interval placement of tracheostomy. Remaining support lines and tubes    as above. 2. Improved aeration of the left lung. Minimally worsened airspace disease    of the right lung with interstitial densities and confluent opacities of    the right hilar region.      Barriers to Discharge: Trach placed yesterday. Necrotic sacral wound with suspected osteomyelitis. General Surgery consulted yesterday for stage IV decub ulcer. No plan for surgery or I&D at this time. Per primary RN, plan for HD today. Large emesis this morning; TF off. Remains on vent w/ETT on PCV, peep 6, FIO2 30%, sats 98%. Afebrile. NSR. Follows commands. Intensivist, Cardiology, GI, Nephrology, and Urology following. Respiratory culture +MRSA. SLP/PT/OT. Telemetry, CVC, TESSIO, michelle, NG clamped, left nephrostomy tube, flexiseal, seaman, SCDs. Precedex @ 0.3 mcg/kg/hr, addie @ 90 mcg/min, allopurinol, SSI, lactobacillus, midodrine, IV protonix, IV zosyn, riociguat, nebs, dakins daily, inhaler, IV vancomycin, Electrolyte replacement protocols. Na+ 133, BUN 39, Creat 2, phos 4.9, alb 2.5, wbc 11.1, hgb 9.1, plt 118. PCP: Ren Linton MD  Readmission Risk Score: 20.8 ( )%  Patient Goals/Plan/Treatment Preferences: From home alone and current with [facility]. SW on case. Plan for [place] [facility] when stable. No precert required.

## 2021-11-18 NOTE — PROGRESS NOTES
Pharmacy Note  Vancomycin Consult     Lorelei Corado is a 73 y.o. 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 Jarad Cota to manage therapy. Also receiving the following antibiotics: zosyn. Patient Active Problem List   Diagnosis    HTN (hypertension)    Chronic depression    CHF (congestive heart failure) (HCC)    Thrombocytopenia (HCC)    Moderate episode of recurrent major depressive disorder (HCC)    Renal failure syndrome    Hyperkalemia    Isolated non-nephrotic proteinuria    ALVIN (acute kidney injury) ([facility] [address]    Chronic right-sided heart failure (HCC)    Pulmonary HTN (HCC)    Hypotension due to hypovolemia    Acute renal failure superimposed on stage 4 chronic kidney disease (HCC)    Pressure ulcer of right buttock, stage 3 (HCC)    Acute respiratory failure (HCC)    Flash pulmonary edema (HCC)    Shock (HCC)    Aspiration pneumonia of left lung (HCC)    Pleural effusion      Allergies:  Patient has no known allergies. Temp max: afebrile          Recent Labs     11/17/21  0320 11/18/21  0235   BUN 32* 39*   CREATININE 1.5* 2.0*   WBC 13.1* 11.1*      Intake/Output Summary (Last 24 hours) at 11/18/2021 0951  Last data filed at 11/18/2021 0630      Gross per 24 hour   Intake 2557.62 ml   Output 435 ml   Net 2122.62 ml      Cultures  Date Source Results   11/13/2021 Respiratory culture Normal magdiel   11/15/2021 Pneumonia PCR Negative   11/15/2021 Respiratory culture Normal magdiel   11/18/2021 Decubitus culture            Ht Readings from Last 1 Encounters:   10/28/21 4' 9\" (1.448 m)            Wt Readings from Last 1 Encounters:   11/16/21 164 lb 3.9 oz (74.5 kg)       Body mass index is 35.54 kg/m². CrCl cannot be calculated (Unknown ideal weight.). Goal Trough Level: 15-20 mcg/mL     Assessment/Plan:  Last vancomycin dose was 11/10/21 and the random level drawn today (8 days later) was still 6.3. Possibly planning hemodialysis today.  Until further plans are made, will give one dose of vancomycin 1000 mg now. Thank you for the consult. Will continue to follow.     Hitesh Pepper, PharmD, BCPS, BCCCP  11/18/2021 11:27 AM

## 2021-11-18 NOTE — PROGRESS NOTES
CRITICAL CARE PROGRESS NOTE      Patient:  Celina Paredes    Unit/Bed:4D-09/009-A  YOB: 1950  MRN: [id number]   PCP: Ghassan Walden MD  Date of Admission: 10/27/2021  Chief Complaint:- Shortness of breath    Assessment and Plan:    Acute combined hypercapnic and hypoxic respiratory failure: Secondary to severe pneumonia with left-sided pleural effusion and repeated mucous plugging. Intubated 10/27/21. Extubated following successful SBT on 11/9/21. Chest x-ray today shows combination of left lung atelectasis most likely secondary to mucous plugging in addition to pleural effusion, she is a status post bronchoscopy with mucous plug removal from the left mainstem on 11/13. Failed HFNC and was reintubated on 11/15/21. Family consented for tracheostomy at that time. Tracheostomy placed 11/17/21. Septic Shock: 2/2 severe PNA now concern for necrotizing wound on sacrum. Initial CI 6.3 w/ NE precludes concern for septic shock NE d/c 2/2 worsening afib RVR. Currently on  Phenylephrine for pressor support. Weaning as tolerated. 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 Probes to bone, necrosis noted around wound site. CT abdomen and pelvis showed possible mi involvement with concern for osteomyelitis. Seen by general surgery on 11/17/21. No indications for debridement at that time. Patient is a poor candidate for surgery given multiple co-morbidities, malnutrition and poor wound healing. Started on Vancomycin 11/18/21 and Zosysn. Cultures pending. Poor prognosis  Stage II ALVIN on CKD 3: Suspect post renal etiology 2/2 staghorn calculi. Nephrology following. CRRT initiated 11/5/21 will continue for UF removal that was continued until 11/12, the patient is off CRRT. 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  ANCA associated Abs show elevated anti MPO elevated 416 and serine proteinase 3 IgG WNL. Will need Bx for confirmation. Mitochondrial Abs pending, Anti-DS-DNA negative, Anti histone negative, Anti Smith,and  Anti-Savana WNL, GBM antibodies negative, C3/C4 levels normal, elevated kappa/lambda free light chains with normal ratio. Plans for Bx per nephrology when stable. Fluid collection noted on left renal unlikely abscess as patient remains afebrile. 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 Will need Renal Bx for confirmation once stable. Discussed case w/ Nephrology. Completed pulse dose steroids starting on 11/8/21 w/ 10mg/kg methylprednisolone for 3 days. SSI started for coverage. Not candidate for Rituxin/TPE 2/2 existing infection. No diffuse alveolar hemorrhage. Hydronephrosis 2/2 Left-sided staghorn calculi: Urology following. Percutaneous drainage tube 11/2/21 IR. Low output following tube placement. Increased flow on CBI per urology recs. Still flushing clots. Perinephric stranding noted on CT abdomen and pelvis w/ air fluid region discontiguous with region of nephrostomy tube insertion raised initial concern for abscess (<3 cm) vs emphysematous pyelonephritis when discussed with radiologist. Received 5 days empiric therapy w/ cefepime (started 11/5/21). On Vancomycin per above. Culture of nephrostomy tube aspirate from 11/5/21 shows staph epidermidis (vancomycin sensitive) . Acute blood loss anemia: Suspect consumptive process. Leading differential , smear, reticulocytes, LDH, Haptoglobin, MERCEDES, B12/folate WNL. suspect 2/2 hemolysis on CRRT. PRBC x1 transfused 10/31/21, 11/4/21, 11/5/21, x2 11/6/21, x3 11/8/21, x3 11/15/21. Heparin stopped (dialysis dose still running). Holding ASA. Thrombocytopenia: suspect consumptive process. Workup per above. Not heparin per above. Hematuria: s/p perc tube placement. CBI per above. Stopped heparin, holding ASA. Suspected Dysphagia: 2/2 prolonged intubation.  SLP eval pending  Bilateral Pleural Effusion:   Associated volume overload in context of renal failure. Interval enlargement noted on CT 11/1/21. Currently on CVVH for effusions. Plan for chest tube insertion if no improvement  Suspected COPD: current every day smoker. Obstructive process noted on flow volume loop on ventilator. Started empiric therapy with Stiolto. Recommend formal PFT once improved  PAH/chronic RV failure NYHA II: EF 55 to 60% G2 DD TTE 5/4/2021. RHC showed [place] 30 with elevated PCWP. Treated w/ Riociguat. Normocytic Anemia: 2/2 hematuria s/p perc tube placement w/ Iron studies consistent w/  MERCEDES vs Anemia chronic disease. B12 WNL, Folate low, Absreticulocyte index 1.9% Soluable transferrin pending. Calculated Iron deficit 827mg. Will start Venofer replace folate once ABx stopped  Cholelithiasis: w/ dilated CBD. Noted on 10/30/2021 US liver/GB. GI following. Does not suspect choledocolithiasis at this time. May consider HIDA scan once stable   Leukocytosis: 2/2 to pulse dose steroids per above  Severe malnutrition: Pre-albumin 14.4 w/ notable cachexia. Chronic tobacco use:   cessation  Mild AS: Noted on previous TTE not appreciated on repeat imaging  Gout:  W/o exacerbation. Continue allopurinol  BARBER: non compliant w/ CPAP  New onset atrial fibrillation with rapid ventricular response (resolved): now in NSR s/p DCCV 11/3/21 Stopped Heparin per hematuria w/ anemia requiring transfusion & and now in NSR. Stopped Amiodarone 11/8/21. Holding metoprolol 2/2 hypotension  Hyperkalemia (resolved): 2/2 ALVIN  Hyponatremia (resolved): Suspect 2/2 renal failure. Severe bilateral multiorganism pneumonia (resolved): PCR positive Staph w/ MECA gene and adenovirus consistent w/ MRSA colonization. Culture growing MSSA.  Repeat culture 11/5/21 shows coag positive staph completed 14 days Vancomycin (started 10/28/21) and completed 5 days Rocephin Clindamycin started for coverage of PVL as patient clinically worsening cultures of nephrostomy tube fluid and repeat resp cultures. 11/7/21: Patient remains clinically well on exam.  We'll continue medical of UF with CRRT. On empiric cefepime for coverage of perinephric abscess noted on CT abdomen and pelvis November 4, 2021. White count trending down. Will attempt spontaneous breathing trial in a.m.    11/8/21: Transfused 1x PRBC this AM. Anemia workup pending. WBC trending down. Patient ventilator settings this AM preclude SBT. Volume overloaded on exam. UF increased to 125 cc/hr this AM. Will attempt to wean vent settings further further. Coag neg staph growing on nephrostomy tube aspirate. Suspect contamination. Continue existing ABx    11/9/21: Pulse dose steroids initiated overnight. Started on SSI. Urine output now improving 75cc/hr. Transfused 3 units PRBCs yesterday for Improve DO2. MERCEDES noted w/ 875 mg deficit. Will replace once steroids/ABx completed. 11/10/21 Extubated overnight. SLP eval today. Continue BiPAP while asleep w/ transition to NC while awake as tolerated. No tachypnea. Day 3 pulse dose steroids. Continued bloody drainage from nephrostomy tube. Holding [facility]     11/11/21 Worsening bilateral infiltrates with SpO2 <90% on HFNC. Continued pulmonary hygiene w/ nebulized hypertonic saline, acapella and breathing treatments in addition to deep suctioning. Continued on Vancomycin for MRSA PNA. Plan for family meeting this AM to discuss goals of care. 11/12/21 Follow-up discussion from family meeting. Patient nodded agreement with full code status including reintubation leading to tracheostomy and PEG placement and continued hemodialysis if needed. Patient nodded agreement and understanding with these decisions. CXR yesterday evening did show left mainstem mucus plugging with cutoff sign and absent breath sounds and did undergo emergent bronchoscopy overnight on 11/12/21.  Diminished breath sounds and worsening respiratory status this AM concerning for continued plugging. Stat chest X-ray ordered for confirmation. 11/13/21: Respiratory status continues to decline, she is requiring BiPAP with FiO2 of 70%. Chest x-ray shows complete opacification of the left lung secondary to mucous plugging, also the patient has left-sided pleural effusion. Plan for bronchoscopy. 11/15/2021: Family meeting today per event note. Reintubated today after failed BiPAP    11/16/2021: will dc abx tomorrow. PNA panel negative. Plan for tracheostomy time permitting today. Weaning phenylephrine as tolerated. 11/17/2021: Necrotic Stage IV pressure ulcer probing to bone noted this AM. CT abdomen and pelvis pending to evaluate for abscess. Will discuss with family today when available. 11/18/2021: CT showed bony involvement of sacral ulcer w/ concern for osteomyelitis. Wound ostomy following. Culture pending. Started on broad spectrum therapy with Vancomycin and Zosyn for coverage of suspect osteomyelitis. Weaning sedation as tolerated    Past Medical History:  Per HPI. Family History:  DM in father and sister. Social History: chronic smoker.     ROS   Patient reports fatigue,  A 12 point review of systems was otherwise negative    Scheduled Meds:   ketamine  100 mg IntraVENous Once    lidocaine 1 % injection  5 mL IntraDERmal Once    insulin lispro  0-6 Units SubCUTAneous Q6H    allopurinol  100 mg Oral Daily    midodrine  10 mg Oral Q8H    clindamycin (CLEOCIN) IV  600 mg IntraVENous Q8H    sodium chloride (Inhalant)  4 mL Nebulization 4 times per day    And    albuterol  2.5 mg Nebulization 4 times per day    phosphorus replacement protocol   Other RX Placeholder    potassium bicarb-citric acid  40 mEq Oral Once    tiotropium-olodaterol  2 puff Inhalation Daily    [Held by provider] aspirin  81 mg Oral Daily    pantoprazole  40 mg IntraVENous QAM    sodium hypochlorite   Irrigation Daily    Riociguat  2.5 mg Oral Q8H    sodium chloride flush  5-40 mL IntraVENous 2 times per day    [Held by provider] FLUoxetine  40 mg Oral Daily    [Held by provider] metoprolol tartrate  12.5 mg Oral BID     Continuous Infusions:   sodium chloride      midazolam      fentaNYL 500 mcg in sodium chloride 0.9% 100 ml infusion Stopped (11/18/21 0100)    phenylephrine (KRISTAL-SYNEPHRINE) 50mg/250mL infusion 100 mcg/min (11/18/21 0210)    dextrose      dexmedetomidine Stopped (11/18/21 0200)    sodium chloride Stopped (11/17/21 0709)    [Held by provider] sodium chloride 50 mL/hr at 10/28/21 2314       PHYSICAL EXAMINATION:  T:  98.  P: 60 RR:  11. B/P: 121/67 FiO2: 40. O2 Sat:  96%. I/O: [id number] net -[id number]  Body mass index is 35.54 kg/m². GCS:  10  PC: 15 PEEP: 6: TV: 380: RRTotal: 18: General: Acute on chronically ill-appearing elderly white female  HEENT: Temporal wasting appreciated. Normocephalic and atraumatic. No scleral icterus. PERR  Neck: supple. No Thyromegaly. Left subclavian dialysis catheter   Lungs: Mild rhonchi appreciated in lower lung fields (interval improvement) Absent left lower lobe breath sounds. No retractions  Cardiac: RRR. No JVD. Abdomen: soft. Nontender. ,npehrostomy tube with bloody drainage  Extremities:  +1 pitting edema x4. (interval improvement noted) No clubbing, cyanosis   Vasculature: capillary refill < 3 seconds. Palpable dorsalis pedis pulses. Skin:  warm and dry. Psych: Alert and oriented to person place and time, affect appropriate  Lymph:  No supraclavicular adenopathy. Neurologic:  No focal deficit. No seizures. Data: (All radiographs, tracings, PFTs, and imaging are personally viewed and interpreted unless otherwise noted).    CMP: Sodium 133 potassium 5.2 chloride 102 bicarb 22 BUN/creatinine 39/2.0 anion gap 9 ionized calcium 1.17 GFR 25 magnesium 2.1 glucose 108 calcium 7.9 phosphorus 4.9 total protein 4.5 albumin 2.5 phosphorus 104 ALT/AST 11/13 total bilirubin 0.5 total protein 4.5  CBC WBC 11.1 H&H 10.1/29.2 platelets 900  SARS-CoV-2 NAAT negative on 10/27/2021  Telemetry shows NSR  Chest x-ray 10/31/2021 demonstrated diffuse bilateral patchy opacities with left-sided pleural effusion and cardiomegaly  Respiratory culture  11/5/21 pending  Pneumonia panel collected 10/27/2021 demonstrates staph aureus. MEC-A gene and adenovirus  CT chest on 11/1/2021 demonstrates bilateral pleural effusions with interval enlargement  CXR 11/3/2021 demonstrates worsening pulmonary congestion with bilateral effusions  UA shows bacteria w/ moderate white cells  CT Abdomen and pelvis 11/5/21 showed perinephric stranding with left-sided hypodense nodule with air-fluid level concerning for abscess. Diffuse ascites and anasarca noted. CXR November 7, 2021 shows bilateral pulmonary edema with worsening right-sided infiltrate  CXR November 9, 2021 shows improvement in bilateral pulmonary edema with worsening left sided infiltrate   Repeat CXR 11/12/21 pending at time of evaluation. Chest x-ray on 11/18/2021 demonstrates stable placement of tracheostomy. bilateral pleural effusions and slight improvement in interstitial disease noted bilaterally         Meets Continued ICU Level Care Criteria:    [x] Yes   [] No - Transfer Planned to listed location:  [] HOSPITALIST CONTACTED- DR       Electronically signed by Mando Fajardo DO PGY-2 on 11/18/2021 at 5:32 AM      Patient seen by me. Case discussed with resident physician. Still mechanically ventilator dependent. Continue with dialysis per nephrology. Still awaiting stability for renal biopsy. .  Italicized font represents changes to the note made by me. CC time 35 minutes. Time was discontiguous. Time does not include procedures. Time does include my direct assessment of the patient and coordination of care.   Electronically signed by Haley Cline MD on 11/19/2021 at 7:36 AM

## 2021-11-18 NOTE — PROGRESS NOTES
Pharmacy Renal Adjustment    Tye Aragon is a 73 y.o. female. Pharmacy renally adjust the following medications per P&T approved policy: Zosyn    Recent Labs     11/17/21  0320 11/18/21  0235   BUN 32* 39*   CREATININE 1.5* 2.0*     CrCl cannot be calculated (Unknown ideal weight.).   Calculated CrCl: 19    Height:   Ht Readings from Last 1 Encounters:   10/28/21 4' 9\" (1.448 m)     Weight:  Wt Readings from Last 1 Encounters:   11/16/21 164 lb 3.9 oz (74.5 kg)     ALVIN    Plan: Adjustments based on renal function:          Decrease Zosyn from q8h to q12h                Dion Gibson, PharmD, BCPS, BCCCP  11/18/2021 8:59 AM

## 2021-11-18 NOTE — PROGRESS NOTES
RN entered patient's room to assess patient and to suction patient's trach. Patient began to have bile, yellow-like emesis. RN stopped TF and called ICU Resident to bedside. Patient is alert, following commands, denies any abdominal pain, or any pain elsewhere.

## 2021-11-18 NOTE — PROGRESS NOTES
[facility]         Renal Inpatient Follow-Up note         11/18/2021 1:47 PM    Pt Name:   Matthew Phoenix:   1950  Attending:   Osmin Phillips MD    Chief Complaint : Svetlana Brian is a 73 y.o. female being followed by nephrology for ALVIN/CKD    Interval History :   Patient seen and examined by me. No distress  Patient has a trach now not much communicative cannot assess history or review of systems  Mild Urine output .   Continues to be on pressors  She is on 30% FiO2     Scheduled Medications :    lactobacillus  1 capsule Per NG tube Daily with breakfast    piperacillin-tazobactam  3,375 mg IntraVENous Q12H    vancomycin (VANCOCIN) intermittent dosing (placeholder)   Other RX Placeholder    vancomycin  1,000 mg IntraVENous Once    ketamine  100 mg IntraVENous Once    lidocaine 1 % injection  5 mL IntraDERmal Once    insulin lispro  0-6 Units SubCUTAneous Q6H    allopurinol  100 mg Oral Daily    midodrine  10 mg Oral Q8H    sodium chloride (Inhalant)  4 mL Nebulization 4 times per day    And    albuterol  2.5 mg Nebulization 4 times per day    phosphorus replacement protocol   Other RX Placeholder    potassium bicarb-citric acid  40 mEq Oral Once    tiotropium-olodaterol  2 puff Inhalation Daily    [Held by provider] aspirin  81 mg Oral Daily    pantoprazole  40 mg IntraVENous QAM    sodium hypochlorite   Irrigation Daily    Riociguat  2.5 mg Oral Q8H    sodium chloride flush  5-40 mL IntraVENous 2 times per day    FLUoxetine  40 mg Oral Daily    [Held by provider] metoprolol tartrate  12.5 mg Oral BID      sodium chloride      midazolam      fentaNYL 500 mcg in sodium chloride 0.9% 100 ml infusion Stopped (11/18/21 0100)    phenylephrine (KRISTAL-SYNEPHRINE) 50mg/250mL infusion 90 mcg/min (11/18/21 1145)    dextrose      dexmedetomidine 0.3 mcg/kg/hr (11/18/21 1341)    sodium chloride Stopped (11/17/21 0709)    [Held by provider] sodium chloride 50 mL/hr at 10/28/21 2314       Vitals :  BP (!) 109/49   Pulse 97   Temp 98.2 °F (36.8 °C) (Oral)   Resp 12   Ht 4' 9\" (1.448 m)   Wt 164 lb 3.9 oz (74.5 kg)   SpO2 98%   BMI 35.54 kg/m²     24HR INTAKE/OUTPUT:      Intake/Output Summary (Last 24 hours) at 11/18/2021 1347  Last data filed at 11/18/2021 1145  Gross per 24 hour   Intake 2733.26 ml   Output 435 ml   Net 2298.26 ml     Last 3 weights  Wt Readings from Last 3 Encounters:   11/16/21 164 lb 3.9 oz (74.5 kg)   10/25/21 164 lb (74.4 kg)   09/29/21 160 lb (72.6 kg)           Physical Exam :  General Appearance:  Well developed. No distress  Mouth/Throat:  Oral mucosa moist.  ET tube in place  Neck:  Supple, no JVD  Lungs:  Breath sounds: clear, diminished  Heart[de-identified]  S1,S2 heard  Abdomen:  Soft, non - tender  Musculoskeletal:  Edema - noted but appears better         Last 3 CBC   Recent Labs     11/16/21  1512 11/17/21  0320 11/18/21  0235   WBC 12.0* 13.1* 11.1*   RBC 3.21* 3.13* 3.06*   HGB 9.6* 9.4* 9.1*   HCT 29.9* 29.2* 29.2*   PLT 70* 94* 118*     Last 3 CMP  Recent Labs     11/16/21  1512 11/17/21  0320 11/18/21  0235    135 133*   K 4.6 4.8 5.2    102 102   CO2 24 24 22*   BUN 24* 32* 39*   CREATININE 1.1 1.5* 2.0*   CALCIUM 8.1* 8.1* 7.9*   LABALBU 2.7* 2.5* 2.5*   BILITOT 1.1 0.8 0.5             ASSESSMENT / Plan   1. Renal -acute kidney injury, multifactorial etiology which includes hypotension from sepsis, IV contrast exposure on 10/29 and has some hydronephrosis as well . ? Patient's creatinine continued to get worse, but primarily fluid status worsened, did not respond to diuretics so started her on CVVH on 11/5/2021 which was stopped on 11/12/2021 but has to be restarted again due to worsening respiratory status  ? No acute need for dialysis today  ? Closely monitor labs and reevaluate in the morning     2. Electrolytes - mild hyponatremia  3. Mild acidosis improved with CVVH  4.  Acute hypercapnic respiratory failure currently ventilator dependent now with a trach  5. Pneumonia with pleural effusion and septic shock  6. Hypotension on pressors wean to MAP greater than 65. Also on midodrine  7. Hydronephrosis status post nephrostomy tube placement  8. + ANCA, patient has been having some proteinuria as an outpatient and during my last visit have ordered all the serologic work-up , which the patient has done prior to getting admitted to the hospital however the ANCA was not done at that time. She may be having some renal pathology going on , but I doubt it has anything to do with acute renal dysfunction during this admission. However she has received steroids. Once clinically stable might consider a renal biopsy. 9. Meds reviewed and discussed with  nursing staff and family. Dr. Zeynep Wilcox MD, M,D.  [facility].

## 2021-11-18 NOTE — PROGRESS NOTES
Pharmacy Note  Vancomycin Consult    Media Mahesh is a 73 y.o. 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 Mariya Vasquez to manage therapy. Also receiving the following antibiotics: zosyn. Patient Active Problem List   Diagnosis    HTN (hypertension)    Chronic depression    CHF (congestive heart failure) (HCC)    Thrombocytopenia (HCC)    Moderate episode of recurrent major depressive disorder (HCC)    Renal failure syndrome    Hyperkalemia    Isolated non-nephrotic proteinuria    ALVIN (acute kidney injury) ([facility] [address]    Chronic right-sided heart failure (HCC)    Pulmonary HTN (HCC)    Hypotension due to hypovolemia    Acute renal failure superimposed on stage 4 chronic kidney disease (HCC)    Pressure ulcer of right buttock, stage 3 (HCC)    Acute respiratory failure (HCC)    Flash pulmonary edema (HCC)    Shock (HCC)    Aspiration pneumonia of left lung (HCC)    Pleural effusion     Allergies:  Patient has no known allergies. Temp max: afebrile    Recent Labs     11/17/21  0320 11/18/21  0235   BUN 32* 39*   CREATININE 1.5* 2.0*   WBC 13.1* 11.1*     Intake/Output Summary (Last 24 hours) at 11/18/2021 0951  Last data filed at 11/18/2021 0630  Gross per 24 hour   Intake 2557.62 ml   Output 435 ml   Net 2122.62 ml     Cultures  Date Source Results   11/13/2021 Respiratory culture Normal magdiel   11/15/2021 Pneumonia PCR Negative   11/15/2021 Respiratory culture Normal magdiel   11/18/2021 Decubitus culture      Ht Readings from Last 1 Encounters:   10/28/21 4' 9\" (1.448 m)        Wt Readings from Last 1 Encounters:   11/16/21 164 lb 3.9 oz (74.5 kg)       Body mass index is 35.54 kg/m². CrCl cannot be calculated (Unknown ideal weight.). Goal Trough Level: 15-20 mcg/mL    Assessment/Plan:  Due to her ALVIN and being on vancomycin last week, will check a random vancomycin level prior to giving a dose to ensure she cleared her previous regimen.     Thank you for the consult. Will continue to follow.     Shefali Pace PharmD, BCPS, BCCCP  11/18/2021 9:58 AM

## 2021-11-18 NOTE — PROGRESS NOTES
[address] [address]  [facility] ICU 4D  Occupational Therapy  Daily Note  Time:   Time In: 7897  Time Out: 1156  Timed Code Treatment Minutes: 11 Minutes  Minutes: 11          Date: 2021  Patient Name: Alcides Lopez,   Gender: female      Room: [facility]009-A  MRN: [id number]  : 1950  (73 y.o.)  Referring Practitioner: Janet Powell DO  Diagnosis: Acute respiratory failure  Additional Pertinent Hx: Per EMR:71F admitted to [facility] 10/27/21 w/ SOB. Current smoker w/ PMH PAH grp 3, HFpEF, stage III sacral ulcer s/p wound ostomy . Patient sister reports SpO2 51% at home and was brought to ED where She required emergent intubation and was transferred to ICU. Workup revealed left sided pleural effusion and underwent US guided thoracentesis. Pt required cardioversion on 11/3, CRRT -, extubated on 11/10, and bronch on  d/t left lung collapse; found to have left main stem mucous plug which was removed. Pt required reintubation on 11/15, trach placed ,    Restrictions/Precautions:  Restrictions/Precautions: General Precautions, Fall Risk  Position Activity Restriction  Other position/activity restrictions: trach to vent,rectal tube, NG, nephrostomy tube      SUBJECTIVE: Pt supine in bed upon arrival, agreeable to OT session. Pt agreeable to bed exercises only, fatigued from PT session earlier this date where she sat at EOB, unable to tolerate again. PAIN: 0/10:     Vitals: Oxygen: >90% throughout with trach to vent, Fio2 of 30% and PEEP of 6  Heart Rate: 80s-90s throughout    COGNITION: Slow Processing    ADL:   No ADL's completed this session. .    ADDITIONAL ACTIVITIES:  Pt completed BUE AAROM exercises while supine in bed. Pt completed 1 set X 10 repetitions in all planes with short rest breaks in between variations. Pt only able to tolerate grasp/release, elbow flex/ext, and punches before requesting to terminate session due to fatigue.  Pt did demo decreased edema in [address] [place] [address] compared to when previously seen last week. Exercises completed to increase overall strength/endurance needed for ADLs and transfers. ASSESSMENT:     Activity Tolerance:  Patient tolerance of  treatment: poor. Fatigued from PT session earlier. Discharge Recommendations: ECF with OT and LTACH  (pending oxygen needs)  Equipment Recommendations: Equipment Needed: No  Other: defer to next level of care  Plan: Times per week: 3-5x  Current Treatment Recommendations: Strengthening, ROM, Balance Training, Functional Mobility Training, Endurance Training, Patient/Caregiver Education & Training, Equipment Evaluation, Education, & procurement, Self-Care / ADL    Patient Education  Patient Education: Plan of Care and Home Exercise Program    Goals  Short term goals  Time Frame for Short term goals: by discharge  Short term goal 1: Pt will tolerate further assessment of EOB sitting balance, transfers, and functional mobility by OTR when appropriate. Short term goal 2: Pt will complete grooming tasks with minimal assistance to increase independence with self care tasks. Short term goal 3: Pt will complete BUE A/AROM exercises X10-15 reps to increase overall strength/endurance needed for UB grooming/eventual feeding tasks. Following session, patient left in safe position with all fall risk precautions in place.

## 2021-11-18 NOTE — PROGRESS NOTES
[address] [facility],[address] ICU 4D - 4D-09/009-A    Time In: 1007  Time Out: 1029  Timed Code Treatment Minutes: 8 Minutes  Minutes: 22          Date: 2021  Patient Name: Velvet Massey,  Gender:  female        MRN: [id number]  : 1950  (73 y.o.)      Referring Practitioner: Michell Ballesteros DO  Diagnosis: acute respiratory failure  Additional Pertinent Hx: admit with above diagnosis,71F admitted to [facility] 10/27/21 w/ SOB. Current smoker w/ PMH PAH grp 3, HFpEF, stage III sacral ulcer s/p wound ostomy . Patient sister reports SpO2 51% at home and was brought to ED where ABG showed pH 7.19, PCO2 80, PO2 134. She required emergent intubation and was transferred to ICU. Workup revealed left sided pleural effusion and underwent US guided thoracentesis w/ 1100 cc removed consistent w/ MRSA/adenovirus. Patient was noted to be in afib/RVR and was placed on amio, heparin drip. Patient was also noted to have normocytic anemia and received 1 unit PRBC 10/31/21. CT abdomen revealed CBD dilation w/ cholelithiasis.  s/p trach placement 21     Restrictions/Precautions:  Restrictions/Precautions: General Precautions, Fall Risk  Position Activity Restriction  Other position/activity restrictions: trach to vent,rectal tube, NG, nephrostomy tube    Subjective:  Chart Reviewed: Yes  Patient assessed for rehabilitation services?: Yes  Subjective: cooperative, pt nodded head for yes/no responses, RN in during session    General:    Hearing: Within functional limits    Pain: no pain per pt    Vitals: vent settings PEEP of 6 and FiO2 30% with O2 sats >/=92% during session    Social/Functional History:    Lives With: Family (brother)  Type of Home: House  Home Layout: One level  Home Equipment: Rolling walker, [employer]     Bathroom Equipment: Shower chair       ADL Assistance: Independent  Homemaking Assistance: Needs assistance (family completes all)  Ambulation Assistance: Independent  Transfer Assistance: Independent          Additional Comments: Brother reported pt has been using RW since 08/21 when had surgery/wound vac for sacral decubitus. Brother reported prior to surgery pt was walking 3,000 steps/day, although since surgery does minimal activity. OBJECTIVE:  Range of Motion:  Bilateral Lower Extremity: A/AAROM WFL except ankle DF to neutral with stretching, knee flexion to 90degrees after  stretching, discussed with RN to get [facility] boot and alternate legs throughout the day for inc stretch    Strength:  Bilateral Lower Extremity: >/=2/5, generalized weakness    Balance:  Static Sitting Balance:  tolerated around 10 min, fluctuated maxA to primarily modAx1, with inc time inc knee flexion and ankle DF with bilateral feet on platform step, Clarence to reach with BUE to try to shake PT's hand, cues for head erect and shoulder retraction for improved posture, pt fatigued at end of balance activity    Bed Mobility:  Rolling to Left: Maximum Assistance, X 2   Supine to Sit: Maximum Assistance, X 2  Sit to Supine: Maximum Assistance, X 2   Scooting: Maximum Assistance, X 2  HOB up 30 degrees, cues for log roll technique and to initiate hooklying position  Transfers:  Not Tested    Ambulation:  Not Tested      Exercise:  Patient was guided in 1 set(s) 3-5 reps of exercise to both lower extremities. Ankle pumps, Heelslides and Hip abduction/adduction. Exercises were completed for increased independence with functional mobility. Functional Outcome Measures: Completed  -PAC Inpatient Mobility without Stair Climbing Raw Score : 5  -PAC Inpatient without Stair Climbing T-Scale Score : 23.59    ASSESSMENT:  Activity Tolerance:  Patient tolerance of  treatment: fair. Treatment Initiated: Treatment and education initiated within context of evaluation.   Evaluation time included review of current medical information, gathering information related to past medical, social and

## 2021-11-18 NOTE — PROGRESS NOTES
Pt awake  Tracheostomy in place  CBI off, urine is concentrated yellow  On pressor    Vitals:    11/18/21 0700 11/18/21 0800 11/18/21 0848 11/18/21 1000   BP: (!) 111/51 (!) 100/53 (!) 110/55 (!) 108/52   Pulse: 124 133 99 100   Resp: 12 17 13 16   Temp:       TempSrc:       SpO2: 93% 94% 97% 96%   Weight:       Height:             Intake/Output Summary (Last 24 hours) at 11/18/2021 1059  Last data filed at 11/18/2021 0630  Gross per 24 hour   Intake 2557.62 ml   Output 435 ml   Net 2122.62 ml       Labs  Recent Labs     11/16/21  1512 11/17/21  0320 11/18/21  0235   WBC 12.0* 13.1* 11.1*   HGB 9.6* 9.4* 9.1*   HCT 29.9* 29.2* 29.2*   PLT 70* 94* 118*    135 133*   K 4.6 4.8 5.2    102 102   CO2 24 24 22*   BUN 24* 32* 39*   CREATININE 1.1 1.5* 2.0*   MG 2.1 2.0 2.1   PHOS 2.7 3.3 4.9*   CALCIUM 8.1* 8.1* 7.9*   Urine culture: No growth (11/2 and 10/27)  Left nephrostomy tube culture: Staph epidermis, MRSE. Very light growth. Exam  General: awake, nods yes and no    Heart: RRR. S1/S2. Abdomen: Soft. Distended. Left nephrostomy tube with bloody drainage. Hypoactive bowel sounds. :  Padilla with concentrated yellow urine. Extremities: UE and LE with 2+ pitting edema bilaterally      Assessment and Plan    1. ALVIN- Multifactorial (sepsis, left hydronephrosis, contrast). Nephrology following     2. Gross Hematuria- resolved, off CBI Urine has remained clear. Call urology with any return of hematuria.     3. Pyelonephritis- Perinephric stranding on CT abdomen and pelvis with a small area concerning for developing abscess in the region of the left nephrostomy tube. Original and subsequent CT on 11/9/21 are stable. CT repeated 11/17/2021 shows no renal abscess.     4. Left Staghorn Calculus- Nephrostomy tube place 11/2/21. Will need treated when patient stable. Tube flushes without problems. Discussed this with patient today.     5. Acute Blood Loss Anemia- HH stable     6.  Respiratory Failure secondary to Pneumonia-  Tracheostomy insertion on 11/17/2021. Urology will continue to follow peripherally.   Contact us if hematuria returns    Electronically signed by LANE Rajan Asa, CNP on 11/18/2021 at 10:59 AM

## 2021-11-18 NOTE — PROGRESS NOTES
Comprehensive Nutrition Assessment    Type and Reason for Visit:  Reassess (TF check)    Nutrition Recommendations/Plan:   TF stopped this morning d/t emesis. Pt currently NPO. Recommend KUB check. When able to restart TF , recommend start Nepro at 10 ml/hr & goal is 30 ml/hr & flush 1 ( 2.5 oz) liquid protein daily. Pt with rectal tube, monitor stool output. Wound care consulted large worsening sacral wound, stage IV, with area of necrosis. Nutrition Assessment:     Pt. nutritionally declining  AEB NPO status due to failed swallow evaluation 11/10, reintubated 11/15 and TF held for  trach placement 11/17, TF restarted & TF stopped 11/18 as pt with emesis.   At risk for further nutrition compromise r/t admitted with acute respiratory failure, intubated 10/27, extubated 11/9, reintubated 11/15, AFib, ALVIN, CRRT & HD  this admit ,  large worsening sacral wound, stage IV,  11/1 nephrosotmy tube placement, anemia , concern for cholecystitis, TF intolerances this admit and underlying medical condition (CKD, s/p ID of buttock wound 8/6/21,CHF, gout, pulmonary HTN, obesity ).  Nutrition recommendations/interventions as per above  Malnutrition Assessment:  Malnutrition Status: At risk for malnutrition (Comment)    Context:  Acute Illness     Findings of the 6 clinical characteristics of malnutrition:  Energy Intake:  1 - 75% or less of estimated energy requirements for 7 or more days  Weight Loss:   (5.8% weight loss in 3.5 months)     Body Fat Loss:  No significant body fat loss     Muscle Mass Loss:  Unable to assess    Fluid Accumulation:  1 - Mild     Strength:  Not Performed    Estimated Daily Nutrient Needs:  Energy (kcal):  ~1352 kcals (18 -increased d/t sacral wound worst);  Weight Used for Energy Requirements:  Admission (75.1 kg)     Protein (g):  ~78 geams ( 2) note stage IV wound & pending renal & dialysis status; Weight Used for Protein Requirements:  Ideal        Fluid (ml/day):  per Aliyah Mccurdy Used for Fluid Requirements:  Other (Comment)      Nutrition Related Findings:   Patient intubated 10/27, extubated 11/9, failed swallow evaluation per Speech Therapy 11/10 , reintubated 11/15 and TF held for trach placement 11/17, TF restarted & stopped this morning d/t emesis. Spoke with Merlene Ramesh RN regarding TF & when able to restart plan add protein modular daily as tolerated. Per RN rectal tube output 11/16 was 200 ml , bag changed today ( no rectal output recorded yesterday), hoping HD today , but pt may need CRRT& pt is alert. Wound care consulted for worsening wound. Per surgery consult, recommendation no OR at this time as do not feel pt would tolerate. MAP 66. Hgb 9.1, Na+133, K+ 5.2, PO4 4.9, BUN 39, Cr 2, WBC 11. 1. meds include precedex,  Benjamin, culturelle, Humalog,prn zofran   prn gycolax, prn senna. Wounds:  Stage IV (large worsening sacral wound, stage IV, with area of necrosis)       Current Nutrition Therapies:    TF stopped this morning d/t vomiting episode.      Anthropometric Measures:  · Height: 4' 9\" (144.8 cm)  · Current Body Weight: 164 lb 3.9 oz (74.5 kg) (11/16 +1 edema)   · Admission Body Weight: 165 lb 9.1 oz (75.1 kg) (10/27 +1 edema)    · Usual Body Weight: 175 lb 11.3 oz (79.7 kg) (per EMR 7/16/21)     · Ideal Body Weight: 85 lbs; % Ideal Body Weight 194.8 %   · BMI: 35.5  · Adjusted Body Weight:  ;  (IBW ~93 #)   · Adjusted BMI:      · BMI Categories: Obese Class 2 (BMI 35.0 -39.9) (on admit)       Nutrition Diagnosis:   · Inadequate oral intake related to swallowing difficulty as evidenced by NPO or clear liquid status due to medical condition, nutrition support - enteral nutrition      Nutrition Interventions:   Food and/or Nutrient Delivery:  Monitor ability to restart TF  Nutrition Education/Counseling:  Education not appropriate   Coordination of Nutrition Care:  Continue to monitor while inpatient, Interdisciplinary Rounds    Goals:  Tube feeding to provide 75% or more of estimated nutrient needs until able to transition to oral diet during LOS       Nutrition Monitoring and Evaluation:   Behavioral-Environmental Outcomes:  None Identified   Food/Nutrient Intake Outcomes:  Enteral Nutrition Intake/Tolerance  Physical Signs/Symptoms Outcomes:  Biochemical Data, GI Status, Nausea or Vomiting, Fluid Status or Edema, Hemodynamic Status, Nutrition Focused Physical Findings, Skin, Weight     Discharge Planning:     Too soon to determine     Electronically signed by Juliette Adames RD, RIGOBERTO on 11/18/21 at 3:02 PM EST    Contact: [phone]

## 2021-11-19 ENCOUNTER — APPOINTMENT (OUTPATIENT)
Dept: GENERAL RADIOLOGY | Age: 71
DRG: 004 | End: 2021-11-19
Payer: MEDICARE

## 2021-11-19 LAB
ABO: NORMAL
ALBUMIN SERPL-MCNC: 2.5 G/DL (ref 3.5–5.1)
ALP BLD-CCNC: 112 U/L (ref 38–126)
ALT SERPL-CCNC: 14 U/L (ref 11–66)
ANION GAP SERPL CALCULATED.3IONS-SCNC: 11 MEQ/L (ref 8–16)
ANTIBODY SCREEN: NORMAL
AST SERPL-CCNC: 21 U/L (ref 5–40)
BASOPHILS # BLD: 0.4 %
BASOPHILS ABSOLUTE: 0 THOU/MM3 (ref 0–0.1)
BILIRUB SERPL-MCNC: 0.5 MG/DL (ref 0.3–1.2)
BUN BLDV-MCNC: 45 MG/DL (ref 7–22)
CALCIUM IONIZED: 1.17 MMOL/L (ref 1.12–1.32)
CALCIUM SERPL-MCNC: 8.3 MG/DL (ref 8.5–10.5)
CHLORIDE BLD-SCNC: 99 MEQ/L (ref 98–111)
CO2: 22 MEQ/L (ref 23–33)
CREAT SERPL-MCNC: 2.6 MG/DL (ref 0.4–1.2)
EOSINOPHIL # BLD: 0.9 %
EOSINOPHILS ABSOLUTE: 0.1 THOU/MM3 (ref 0–0.4)
ERYTHROCYTE [DISTWIDTH] IN BLOOD BY AUTOMATED COUNT: 17.7 % (ref 11.5–14.5)
ERYTHROCYTE [DISTWIDTH] IN BLOOD BY AUTOMATED COUNT: 63.7 FL (ref 35–45)
GFR SERPL CREATININE-BSD FRML MDRD: 18 ML/MIN/1.73M2
GLUCOSE BLD-MCNC: 102 MG/DL (ref 70–108)
GLUCOSE BLD-MCNC: 107 MG/DL (ref 70–108)
GLUCOSE BLD-MCNC: 82 MG/DL (ref 70–108)
GLUCOSE BLD-MCNC: 84 MG/DL (ref 70–108)
GLUCOSE BLD-MCNC: 85 MG/DL (ref 70–108)
GLUCOSE BLD-MCNC: 96 MG/DL (ref 70–108)
GLUCOSE BLD-MCNC: 97 MG/DL (ref 70–108)
HCT VFR BLD CALC: 24.1 % (ref 37–47)
HCT VFR BLD CALC: 25.5 % (ref 37–47)
HCT VFR BLD CALC: 27.5 % (ref 37–47)
HEMOGLOBIN: 7.3 GM/DL (ref 12–16)
HEMOGLOBIN: 7.9 GM/DL (ref 12–16)
HEMOGLOBIN: 8.7 GM/DL (ref 12–16)
IMMATURE GRANS (ABS): 0.07 THOU/MM3 (ref 0–0.07)
IMMATURE GRANULOCYTES: 0.7 %
LYMPHOCYTES # BLD: 5.1 %
LYMPHOCYTES ABSOLUTE: 0.5 THOU/MM3 (ref 1–4.8)
MAGNESIUM: 2 MG/DL (ref 1.6–2.4)
MCH RBC QN AUTO: 29.8 PG (ref 26–33)
MCHC RBC AUTO-ENTMCNC: 30.3 GM/DL (ref 32.2–35.5)
MCV RBC AUTO: 98.4 FL (ref 81–99)
MONOCYTES # BLD: 8.7 %
MONOCYTES ABSOLUTE: 0.9 THOU/MM3 (ref 0.4–1.3)
NUCLEATED RED BLOOD CELLS: 0 /100 WBC
PHOSPHORUS: 5.4 MG/DL (ref 2.4–4.7)
PLATELET # BLD: 113 THOU/MM3 (ref 130–400)
PMV BLD AUTO: 10 FL (ref 9.4–12.4)
POTASSIUM SERPL-SCNC: 5.4 MEQ/L (ref 3.5–5.2)
POTASSIUM SERPL-SCNC: 5.6 MEQ/L (ref 3.5–5.2)
RBC # BLD: 2.45 MILL/MM3 (ref 4.2–5.4)
RH FACTOR: NORMAL
SEG NEUTROPHILS: 84.2 %
SEGMENTED NEUTROPHILS ABSOLUTE COUNT: 8.4 THOU/MM3 (ref 1.8–7.7)
SODIUM BLD-SCNC: 132 MEQ/L (ref 135–145)
TOTAL PROTEIN: 5.2 G/DL (ref 6.1–8)
VANCOMYCIN RANDOM: 14.1 UG/ML (ref 0.1–39.9)
WBC # BLD: 10 THOU/MM3 (ref 4.8–10.8)

## 2021-11-19 PROCEDURE — 2580000003 HC RX 258: Performed by: STUDENT IN AN ORGANIZED HEALTH CARE EDUCATION/TRAINING PROGRAM

## 2021-11-19 PROCEDURE — 85014 HEMATOCRIT: CPT

## 2021-11-19 PROCEDURE — 84100 ASSAY OF PHOSPHORUS: CPT

## 2021-11-19 PROCEDURE — P9016 RBC LEUKOCYTES REDUCED: HCPCS

## 2021-11-19 PROCEDURE — 99291 CRITICAL CARE FIRST HOUR: CPT | Performed by: INTERNAL MEDICINE

## 2021-11-19 PROCEDURE — 83735 ASSAY OF MAGNESIUM: CPT

## 2021-11-19 PROCEDURE — 86900 BLOOD TYPING SEROLOGIC ABO: CPT

## 2021-11-19 PROCEDURE — 87186 SC STD MICRODIL/AGAR DIL: CPT

## 2021-11-19 PROCEDURE — 6360000002 HC RX W HCPCS: Performed by: INTERNAL MEDICINE

## 2021-11-19 PROCEDURE — 97530 THERAPEUTIC ACTIVITIES: CPT

## 2021-11-19 PROCEDURE — 86923 COMPATIBILITY TEST ELECTRIC: CPT

## 2021-11-19 PROCEDURE — 2580000003 HC RX 258: Performed by: EMERGENCY MEDICINE

## 2021-11-19 PROCEDURE — 6370000000 HC RX 637 (ALT 250 FOR IP): Performed by: INTERNAL MEDICINE

## 2021-11-19 PROCEDURE — 87070 CULTURE OTHR SPECIMN AEROBIC: CPT

## 2021-11-19 PROCEDURE — 87077 CULTURE AEROBIC IDENTIFY: CPT

## 2021-11-19 PROCEDURE — 6360000002 HC RX W HCPCS: Performed by: NURSE PRACTITIONER

## 2021-11-19 PROCEDURE — 99233 SBSQ HOSP IP/OBS HIGH 50: CPT | Performed by: INTERNAL MEDICINE

## 2021-11-19 PROCEDURE — 82330 ASSAY OF CALCIUM: CPT

## 2021-11-19 PROCEDURE — 6370000000 HC RX 637 (ALT 250 FOR IP): Performed by: FAMILY MEDICINE

## 2021-11-19 PROCEDURE — 6360000002 HC RX W HCPCS: Performed by: STUDENT IN AN ORGANIZED HEALTH CARE EDUCATION/TRAINING PROGRAM

## 2021-11-19 PROCEDURE — 71045 X-RAY EXAM CHEST 1 VIEW: CPT

## 2021-11-19 PROCEDURE — 94003 VENT MGMT INPAT SUBQ DAY: CPT

## 2021-11-19 PROCEDURE — 36430 TRANSFUSION BLD/BLD COMPNT: CPT

## 2021-11-19 PROCEDURE — 36415 COLL VENOUS BLD VENIPUNCTURE: CPT

## 2021-11-19 PROCEDURE — 6360000002 HC RX W HCPCS

## 2021-11-19 PROCEDURE — 80053 COMPREHEN METABOLIC PANEL: CPT

## 2021-11-19 PROCEDURE — 80202 ASSAY OF VANCOMYCIN: CPT

## 2021-11-19 PROCEDURE — 2500000003 HC RX 250 WO HCPCS: Performed by: INTERNAL MEDICINE

## 2021-11-19 PROCEDURE — 85018 HEMOGLOBIN: CPT

## 2021-11-19 PROCEDURE — 86850 RBC ANTIBODY SCREEN: CPT

## 2021-11-19 PROCEDURE — 85025 COMPLETE CBC W/AUTO DIFF WBC: CPT

## 2021-11-19 PROCEDURE — 87147 CULTURE TYPE IMMUNOLOGIC: CPT

## 2021-11-19 PROCEDURE — 2580000003 HC RX 258

## 2021-11-19 PROCEDURE — 2580000003 HC RX 258: Performed by: INTERNAL MEDICINE

## 2021-11-19 PROCEDURE — 86901 BLOOD TYPING SEROLOGIC RH(D): CPT

## 2021-11-19 PROCEDURE — 6370000000 HC RX 637 (ALT 250 FOR IP): Performed by: NURSE PRACTITIONER

## 2021-11-19 PROCEDURE — 82948 REAGENT STRIP/BLOOD GLUCOSE: CPT

## 2021-11-19 PROCEDURE — 84132 ASSAY OF SERUM POTASSIUM: CPT

## 2021-11-19 PROCEDURE — 2500000003 HC RX 250 WO HCPCS: Performed by: STUDENT IN AN ORGANIZED HEALTH CARE EDUCATION/TRAINING PROGRAM

## 2021-11-19 PROCEDURE — C9113 INJ PANTOPRAZOLE SODIUM, VIA: HCPCS | Performed by: NURSE PRACTITIONER

## 2021-11-19 PROCEDURE — 6370000000 HC RX 637 (ALT 250 FOR IP): Performed by: STUDENT IN AN ORGANIZED HEALTH CARE EDUCATION/TRAINING PROGRAM

## 2021-11-19 PROCEDURE — 87075 CULTR BACTERIA EXCEPT BLOOD: CPT

## 2021-11-19 PROCEDURE — 2000000000 HC ICU R&B

## 2021-11-19 PROCEDURE — 94640 AIRWAY INHALATION TREATMENT: CPT

## 2021-11-19 RX ORDER — ALBUTEROL SULFATE 2.5 MG/3ML
2.5 SOLUTION RESPIRATORY (INHALATION) EVERY 6 HOURS
Status: DISCONTINUED | OUTPATIENT
Start: 2021-11-19 | End: 2021-12-01 | Stop reason: HOSPADM

## 2021-11-19 RX ORDER — METOCLOPRAMIDE HYDROCHLORIDE 5 MG/ML
10 INJECTION INTRAMUSCULAR; INTRAVENOUS EVERY 6 HOURS
Status: DISCONTINUED | OUTPATIENT
Start: 2021-11-19 | End: 2021-11-20

## 2021-11-19 RX ORDER — LIDOCAINE HYDROCHLORIDE 20 MG/ML
2 INJECTION, SOLUTION INFILTRATION; PERINEURAL ONCE
Status: COMPLETED | OUTPATIENT
Start: 2021-11-19 | End: 2021-11-19

## 2021-11-19 RX ORDER — METOCLOPRAMIDE HYDROCHLORIDE 5 MG/ML
5 INJECTION INTRAMUSCULAR; INTRAVENOUS EVERY 6 HOURS
Status: DISCONTINUED | OUTPATIENT
Start: 2021-11-19 | End: 2021-11-19

## 2021-11-19 RX ORDER — SODIUM CHLORIDE 9 MG/ML
INJECTION, SOLUTION INTRAVENOUS PRN
Status: COMPLETED | OUTPATIENT
Start: 2021-11-19 | End: 2021-11-22

## 2021-11-19 RX ORDER — LIDOCAINE HYDROCHLORIDE 40 MG/ML
4 INJECTION, SOLUTION RETROBULBAR; TOPICAL 4 TIMES DAILY
Status: DISCONTINUED | OUTPATIENT
Start: 2021-11-19 | End: 2021-11-21

## 2021-11-19 RX ORDER — DEXTROSE MONOHYDRATE 100 MG/ML
INJECTION, SOLUTION INTRAVENOUS CONTINUOUS
Status: DISCONTINUED | OUTPATIENT
Start: 2021-11-19 | End: 2021-11-23

## 2021-11-19 RX ORDER — LIDOCAINE HYDROCHLORIDE 40 MG/ML
4 INJECTION, SOLUTION RETROBULBAR; TOPICAL 4 TIMES DAILY
Status: DISCONTINUED | OUTPATIENT
Start: 2021-11-19 | End: 2021-11-19

## 2021-11-19 RX ORDER — SODIUM CHLORIDE FOR INHALATION 3 %
4 VIAL, NEBULIZER (ML) INHALATION EVERY 6 HOURS
Status: DISCONTINUED | OUTPATIENT
Start: 2021-11-19 | End: 2021-12-01 | Stop reason: HOSPADM

## 2021-11-19 RX ORDER — DEXTROSE MONOHYDRATE 50 MG/ML
INJECTION, SOLUTION INTRAVENOUS CONTINUOUS
Status: DISCONTINUED | OUTPATIENT
Start: 2021-11-19 | End: 2021-11-19

## 2021-11-19 RX ADMIN — SODIUM CHLORIDE 0.4 MCG/KG/HR: 9 INJECTION, SOLUTION INTRAVENOUS at 03:54

## 2021-11-19 RX ADMIN — SODIUM CHLORIDE SOLN NEBU 3% 4 ML: 3 NEBU SOLN at 05:36

## 2021-11-19 RX ADMIN — ALBUTEROL SULFATE 2.5 MG: 2.5 SOLUTION RESPIRATORY (INHALATION) at 12:20

## 2021-11-19 RX ADMIN — PANTOPRAZOLE SODIUM 40 MG: 40 INJECTION, POWDER, FOR SOLUTION INTRAVENOUS at 09:14

## 2021-11-19 RX ADMIN — TIOTROPIUM BROMIDE AND OLODATEROL 2 PUFF: 3.124; 2.736 SPRAY, METERED RESPIRATORY (INHALATION) at 08:35

## 2021-11-19 RX ADMIN — CHLORHEXIDINE GLUCONATE 15 ML: 1.2 RINSE ORAL at 20:13

## 2021-11-19 RX ADMIN — PIPERACILLIN AND TAZOBACTAM 3375 MG: 3; .375 INJECTION, POWDER, LYOPHILIZED, FOR SOLUTION INTRAVENOUS at 09:36

## 2021-11-19 RX ADMIN — MIDODRINE HYDROCHLORIDE 15 MG: 10 TABLET ORAL at 20:14

## 2021-11-19 RX ADMIN — SODIUM CHLORIDE SOLN NEBU 3% 4 ML: 3 NEBU SOLN at 12:20

## 2021-11-19 RX ADMIN — DEXTROSE MONOHYDRATE: 100 INJECTION, SOLUTION INTRAVENOUS at 09:51

## 2021-11-19 RX ADMIN — ALBUTEROL SULFATE 2.5 MG: 2.5 SOLUTION RESPIRATORY (INHALATION) at 05:35

## 2021-11-19 RX ADMIN — LIDOCAINE HYDROCHLORIDE 2 ML: 20 INJECTION, SOLUTION INFILTRATION; PERINEURAL at 10:09

## 2021-11-19 RX ADMIN — MIDODRINE HYDROCHLORIDE 10 MG: 10 TABLET ORAL at 09:14

## 2021-11-19 RX ADMIN — SODIUM ZIRCONIUM CYCLOSILICATE 10 G: 5 POWDER, FOR SUSPENSION ORAL at 17:10

## 2021-11-19 RX ADMIN — LIDOCAINE HYDROCHLORIDE 4 ML: 40 INJECTION, SOLUTION RETROBULBAR; TOPICAL at 16:10

## 2021-11-19 RX ADMIN — DEXTROSE MONOHYDRATE: 50 INJECTION, SOLUTION INTRAVENOUS at 09:07

## 2021-11-19 RX ADMIN — SODIUM CHLORIDE, PRESERVATIVE FREE 10 ML: 5 INJECTION INTRAVENOUS at 09:15

## 2021-11-19 RX ADMIN — METOCLOPRAMIDE 5 MG: 5 INJECTION, SOLUTION INTRAMUSCULAR; INTRAVENOUS at 17:19

## 2021-11-19 RX ADMIN — METOCLOPRAMIDE HYDROCHLORIDE 10 MG: 5 INJECTION INTRAMUSCULAR; INTRAVENOUS at 22:08

## 2021-11-19 RX ADMIN — ONDANSETRON 4 MG: 2 INJECTION INTRAMUSCULAR; INTRAVENOUS at 09:56

## 2021-11-19 RX ADMIN — SODIUM CHLORIDE 0.3 MCG/KG/HR: 9 INJECTION, SOLUTION INTRAVENOUS at 18:55

## 2021-11-19 RX ADMIN — DEXTROSE MONOHYDRATE: 100 INJECTION, SOLUTION INTRAVENOUS at 17:41

## 2021-11-19 RX ADMIN — CHLORHEXIDINE GLUCONATE 15 ML: 1.2 RINSE ORAL at 09:14

## 2021-11-19 RX ADMIN — Medication 1 CAPSULE: at 09:14

## 2021-11-19 RX ADMIN — MIDODRINE HYDROCHLORIDE 10 MG: 10 TABLET ORAL at 14:52

## 2021-11-19 RX ADMIN — PIPERACILLIN AND TAZOBACTAM 3375 MG: 3; .375 INJECTION, POWDER, LYOPHILIZED, FOR SOLUTION INTRAVENOUS at 22:07

## 2021-11-19 RX ADMIN — ALLOPURINOL 100 MG: 100 TABLET ORAL at 09:14

## 2021-11-19 RX ADMIN — PHENYLEPHRINE HYDROCHLORIDE 30 MCG/MIN: 10 INJECTION INTRAVENOUS at 09:10

## 2021-11-19 RX ADMIN — FLUOXETINE 40 MG: 20 CAPSULE ORAL at 09:14

## 2021-11-19 RX ADMIN — METOCLOPRAMIDE HYDROCHLORIDE 10 MG: 5 INJECTION INTRAMUSCULAR; INTRAVENOUS at 17:10

## 2021-11-19 RX ADMIN — SODIUM HYPOCHLORITE: 1.25 SOLUTION TOPICAL at 09:44

## 2021-11-19 RX ADMIN — SODIUM CHLORIDE SOLN NEBU 3% 4 ML: 3 NEBU SOLN at 00:46

## 2021-11-19 RX ADMIN — ALBUTEROL SULFATE 2.5 MG: 2.5 SOLUTION RESPIRATORY (INHALATION) at 00:45

## 2021-11-19 RX ADMIN — VANCOMYCIN HYDROCHLORIDE 1000 MG: 1 INJECTION, POWDER, LYOPHILIZED, FOR SOLUTION INTRAVENOUS at 22:07

## 2021-11-19 ASSESSMENT — PULMONARY FUNCTION TESTS
PIF_VALUE: 23
PIF_VALUE: 19
PIF_VALUE: 23

## 2021-11-19 ASSESSMENT — PAIN SCALES - GENERAL
PAINLEVEL_OUTOF10: 0

## 2021-11-19 NOTE — PROGRESS NOTES
RNs x 2 attempted to perform dressing change to coccyx. Patient's oxygen saturation declined 79% on vent. RN performed tracheal and oral suctioning, patient's NG continues to effect pt gag's reflex. Patient did not tolerate. RN bathed the patient's front and changed her gown.

## 2021-11-19 NOTE — PROGRESS NOTES
[address] [facility] PHYSICAL THERAPY  DAILY NOTE  [facility] ICU 4D - 4D-009-A      Time In: 1344  Time Out: 1413  Timed Code Treatment Minutes: 29 Minutes  Minutes: 29   co-tx with OT due to multiple complexity and she wasn't able to tolerate 2 sessions yesterday       Date: 2021  Patient Name: Mariana Snyder,  Gender:  female        MRN: [id number]  : 1950  (73 y.o.)     Referring Practitioner: New Mckinney DO  Diagnosis: acute respiratory failure  Additional Pertinent Hx: admit with above diagnosis,71F admitted to [facility] 10/27/21 w/ SOB. Current smoker w/ PMH PAH grp 3, HFpEF, stage III sacral ulcer s/p wound ostomy . Patient sister reports SpO2 51% at home and was brought to ED where ABG showed pH 7.19, PCO2 80, PO2 134. She required emergent intubation and was transferred to ICU. Workup revealed left sided pleural effusion and underwent US guided thoracentesis w/ 1100 cc removed consistent w/ MRSA/adenovirus. Patient was noted to be in afib/RVR and was placed on amio, heparin drip. Patient was also noted to have normocytic anemia and received 1 unit PRBC 10/31/21. CT abdomen revealed CBD dilation w/ cholelithiasis. s/p trach placement 21     Prior Level of Function:  Lives With: Family (brother)  Type of Home: House  Home Layout: One level  Home Equipment: Rolling walker, [employer]   Bathroom Equipment: Shower chair    ADL Assistance: Independent  Homemaking Assistance: Needs assistance (family completes all)  Ambulation Assistance: Independent  Transfer Assistance: Independent  Additional Comments: Brother reported pt has been using RW since  when had surgery/wound vac for sacral decubitus. Brother reported prior to surgery pt was walking 3,000 steps/day, although since surgery does minimal activity.     Restrictions/Precautions:  Restrictions/Precautions: General Precautions, Fall Risk  Position Activity Restriction  Other position/activity restrictions: trach to vent,rectal tube, NG, nephrostomy tube       SUBJECTIVE: pt on hold this am due to O2 sats this pm nursing ok'd therapy pt awake and agreeable for therapy- pt kept eyes open however she did struggle to scan or move her head and eyes to the staff - pt felt stiff and rigid throughout session     PAIN: 0/10:       Vitals:  Blood Pressure: 116/50  Oxygen: 97% trach to vent with PEEP of 6 and Fio2 of 30%; RN did give 100% fio2 boost before initiating transition to EOB but then back down to 30%, although Sp02 remained >90% throughout  Heart Rate: 86 bpm  OBJECTIVE:  Bed Mobility:  Rolling to Left: Dependent, X 2, pt somewhat resistive    Rolling to Right: Dependent, X 2, pt resistive    Supine to Sit: Maximum Assistance, X 2  Sit to Supine: Maximum Assistance, X 2   Scooting: Maximum Assistance, X 2      Balance:  Pt sat edge of bed for 10 min she needed max assist of one and CGA to min assist of another- attempted to get pt to use her UEs on the bed to assist with sitting balance- she did a nice job of holding her head up-     EFunctional Outcome Measures: Completed  AM-PAC Inpatient Mobility without Stair Climbing Raw Score : 5  AM-PAC Inpatient without Stair Climbing T-Scale Score : 23.59    ASSESSMENT:  Assessment: Pt cont to demonstrate decreased strength, endurance and balance and would benefit from cont skilled therapy   Activity Tolerance:  Patient tolerance of  treatment: fair. Equipment Recommendations: Other: cont to assess needs   Discharge Recommendations: SNF vs LTAC  Plan: Times per week: 3-5X GM  Times per day: Daily  Specific instructions for Next Treatment: therex, bed mobility, sitting balance, PT to assess transfers    Patient Education  Patient Education: Plan of Care    Goals:  Patient goals : not stated  Short term goals  Time Frame for Short term goals: by discharge  Short term goal 1: Roll L/R with modA for pressure relief  Short term goal 2: sidelying to/from sit with modAx2 to get in/out of bed  Short term goal 3: tolerate >15 min sitting balance activity with </=CGA for balance to demonstrate inc strength/endurance for progression to transfers  Short term goal 4: PT to assess transfers/gait  Long term goals  Time Frame for Long term goals : no LTGs set secondary to short ELOS    Following session, patient left in safe position with all fall risk precautions in place.

## 2021-11-19 NOTE — CARE COORDINATION
[address] [facility] day: 23  Location: -09/009-A Reason for admit: Acute respiratory failure ([address] [J96.00]  Flash pulmonary edema ([address] [J81.0]  Acute respiratory failure with hypercapnia ([address] [J96.02]   Procedure:   11/11 Bronch: Left main stem mucous plug; removed. 11/12 CRRT stopped  11/13 CRRT restarted  11/13 Bronch: Left main stem mucous plug removed; left lobes with edematous mucosa  11/14 CBI stopped  11/15 Reintubated  11/16 CRRT stopped  11/17 CT Abd/pelvis: Left nephrostomy tube with perinephric stranding and minimal mesenteric edema/ascites. No convincing evidence of an abscess collection;   11/17 Trach placed: Bivona 7.5 mm  11/18 CXR:   1. Interval placement of tracheostomy. Remaining support lines and tubes    as above. 2. Improved aeration of the left lung. Minimally worsened airspace disease    of the right lung with interstitial densities and confluent opacities of    the right hilar region. 11/19  CXR:  Dobbhoff tube projects over the stomach. Barriers to Discharge: trach placed 11/17, remains on vent per ETT rate 15, PEEP 6, FiO2 30%, sats 98%. Removed NG tube due to coughing gagging and vomits. Place dubhoff tube. Complex dressing change to coccyx, O2 sat dropped to 79%/vent. Tracheal and oral suctioning prn, Zosyn, Vancomycin, K+ 5.4, Hgb 7.9, Electrolyte replacement protocols. PCP: Mukul Griffin MD  Readmission Risk Score: 22.3 ( )%  Patient Goals/Plan/Treatment Preferences: From home alone and current with [facility] HH. SW on case. Plan for [facility] when stable.  No precert required.

## 2021-11-19 NOTE — PROGRESS NOTES
[facility] ICU 4D  Occupational Therapy  Daily Note  Time:   Time In: 1344  Time Out: 1413  Timed Code Treatment Minutes: 29 Minutes  Minutes: 29       Co-tx completed with PT due to medically complex, requiring assist X2 to safely complete and would not tolerate 2 separate therapy sessions. Date: 2021  Patient Name: Karen Barbosa,   Gender: female      [facility]  MRN: [id number]  : 1950  (73 y.o.)  Referring Practitioner: Lewis Luis DO  Diagnosis: Acute respiratory failure  Additional Pertinent Hx: Per EMR:71F admitted to [facility] 10/27/21 w/ SOB. Current smoker w/ PMH PAH grp 3, HFpEF, stage III sacral ulcer s/p wound ostomy . Patient sister reports SpO2 51% at home and was brought to ED where She required emergent intubation and was transferred to ICU. Workup revealed left sided pleural effusion and underwent US guided thoracentesis. Pt required cardioversion on 11/3, CRRT -, extubated on 11/10, and bronch on  d/t left lung collapse; found to have left main stem mucous plug which was removed. Pt required reintubation on 11/15, trach placed ,    Restrictions/Precautions:  Restrictions/Precautions: General Precautions, Fall Risk  Position Activity Restriction  Other position/activity restrictions: trach to vent,rectal tube, NG, nephrostomy tube      SUBJECTIVE: Pt supine in bed upon arrival, agreeable to OT/PT session. RN present intermittently throughout. RN did suction at end of session, pt noted to have bloody secretions in trach/vent tubing. PAIN: 0/10:     Vitals: Blood Pressure: 116/50  Oxygen: 97% trach to vent with PEEP of 6 and Fio2 of 30%; RN did give 100% fio2 boost before initiating transition to EOB, although Sp02 remained >90% throughout  Heart Rate: 86 bpm    COGNITION: Slow Processing and Difficulty Following Commands    ADL:   No ADL's completed this session. Rometta Favre     BALANCE:  Sitting Balance:  Maximum Assistance, X 1. + occasional min assist of another. Pt tolerated sitting at EOB X10 minutes. Posterior lean noted requiring assist to correct. Max cues for posture. Pt with fear of falling. Platform utilized due to pt's short stature    BED MOBILITY:  Rolling to Left: Dependent, X 2 for repositioning of linens and changing of chux/hygiene task; pt appeared resistive to rolling due to fear of falling  Rolling to Right: Dependent, X 2 for repositioning of linens and changing of chux/hygiene task; pt appeared resistive to rolling due to fear of falling  Supine to Sit: Maximum Assistance, X 2, with head of bed raised, with verbal cues , with increased time for completion    Sit to Supine: Maximum Assistance, X 2, with verbal cues , with increased time for completion    Scooting: Maximum Assistance, X 2 ; (assist X1 for sitting blanace + assist of another to advance hips forward to EOB    ADDITIONAL ACTIVITIES:  Pt able to demo slight reaching/sliding across bed with BUEs and repositioning of UEs although requiring max assist to complete. ASSESSMENT:     Activity Tolerance:  Patient tolerance of  treatment: fair. Discharge Recommendations: ECF with OT vs LTACH (pending O2 requirements)  Equipment Recommendations: Equipment Needed: No  Other: defer to next level of care  Plan: Times per week: 5x  Current Treatment Recommendations: Strengthening, ROM, Balance Training, Functional Mobility Training, Endurance Training, Patient/Caregiver Education & Training, Equipment Evaluation, Education, & procurement, Self-Care / ADL    Patient Education  Patient Education: Plan of Care, Importance of Increasing Activity and sitting posture    Goals  Short term goals  Time Frame for Short term goals: by discharge  Short term goal 1: Pt will tolerate further assessment of EOB sitting balance, transfers, and functional mobility by OTR when appropriate. GOal partially met, revised.   Short term goal 2: Pt will complete grooming tasks with minimal assistance to increase independence with self care tasks. Short term goal 3: Pt will complete BUE A/AROM exercises X10-15 reps to increase overall strength/endurance needed for UB grooming/eventual feeding tasks. Revised Short-Term Goals  Short term goals  Time Frame for Short term goals: by discharge  Short term goal 1: Pt will tolerate further assessment of transfers, and functional mobility by OTR when appropriate. Short term goal 2: Pt will complete grooming tasks with minimal assistance to increase independence with self care tasks. Short term goal 3: Pt will complete BUE A/AROM exercises X10-15 reps to increase overall strength/endurance needed for UB grooming/eventual feeding tasks. Short term goal 4: Pt will tolerate sitting at EOB >10 minutes with consistent minimal assistance in prep for ADL completion. Following session, patient left in safe position with all fall risk precautions in place.

## 2021-11-19 NOTE — PROGRESS NOTES
CRITICAL CARE PROGRESS NOTE      Patient:  [facility]    Unit/Bed:4D-09/009-A  YOB: 1950  MRN: [id number]   PCP: Verona Gresham MD  Date of Admission: 10/27/2021  Chief Complaint:- Shortness of breath    Assessment and Plan:    1. Acute combined hypercapnic and hypoxic respiratory failure: Secondary to severe pneumonia with left-sided pleural effusion and repeated mucous plugging. Intubated 10/27/21. Extubated following successful SBT on 11/9/21. Chest x-ray today shows combination of left lung atelectasis most likely secondary to mucous plugging in addition to pleural effusion, she is a status post bronchoscopy with mucous plug removal from the left mainstem on 11/13. Failed HFNC and was reintubated on 11/15/21. Family consented for tracheostomy at that time. Tracheostomy placed 11/17/21.   2. Septic Shock: 2/2 severe PNA now concern for necrotizing wound on sacrum. Initial CI 6.3 w/ NE precludes concern for septic shock NE d/c 2/2 worsening afib RVR. Currently on  Phenylephrine for pressor support. Weaning as tolerated. 3. 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 Probes to bone, necrosis noted around wound site. CT abdomen and pelvis showed possible mi involvement with concern for osteomyelitis. Seen by general surgery on 11/17/21. No indications for debridement at that time. Patient is a poor candidate for surgery given multiple co-morbidities, malnutrition and poor wound healing. Started on Vancomycin 11/18/21 and Zosysn. Cultures pending. Poor prognosis. Continue Dakins w/ dressing changes. Will de-escalate following culture sensitivity. 4. Stage II ALVIN on CKD 3: Suspect post renal etiology 2/2 staghorn calculi. Nephrology following. CRRT initiated 11/5/21 will continue for UF removal that was continued until 11/12, the patient is off CRRT. 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 ANCA associated Abs show elevated anti MPO elevated 416 and serine proteinase 3 IgG WNL. Will need Bx for confirmation. Mitochondrial Abs pending, Anti-DS-DNA negative, Anti histone negative, Anti Smith,and  Anti-Savana WNL, GBM antibodies negative, C3/C4 levels normal, elevated kappa/lambda free light chains with normal ratio. Plans for Bx per nephrology when stable. Fluid collection noted on left renal unlikely abscess as patient remains afebrile. CVVH stopped 11/15/21 significant oliguria since that time. 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 Will need Renal Bx for confirmation once stable. Discussed case w/ Nephrology. Completed pulse dose steroids starting on 11/8/21 w/ 10mg/kg methylprednisolone for 3 days. SSI started for coverage. Not candidate for Rituxin/TPE 2/2 existing infection. No diffuse alveolar hemorrhage. 6. Hydronephrosis 2/2 Left-sided staghorn calculi: Urology following. Percutaneous drainage tube 11/2/21 IR. Low output following tube placement. Increased flow on CBI per urology recs. Still flushing clots. Perinephric stranding noted on CT abdomen and pelvis w/ air fluid region discontiguous with region of nephrostomy tube insertion raised initial concern for abscess (<3 cm) vs emphysematous pyelonephritis when discussed with radiologist. Received 5 days empiric therapy w/ cefepime (started 11/5/21). On Vancomycin per above. Culture of nephrostomy tube aspirate from 11/5/21 shows staph epidermidis (vancomycin sensitive) . 7. Acute blood loss anemia: Suspect consumptive process. Leading differential , smear, reticulocytes, LDH, Haptoglobin, MERCEDES, B12/folate WNL. suspect 2/2 hemolysis on CRRT. PRBC x1 transfused 10/31/21, 11/4/21, 11/5/21, x2 11/6/21, x3 11/8/21, x3 11/15/21. Heparin stopped (dialysis dose still running). Holding ASA. 8. Thrombocytopenia: suspect consumptive process in context of sepsis. Workup per above.  Not on heparin per above. 9. Hematuria: s/p perc tube placement. CBI per above. Stopped heparin, holding ASA. 10. Dysphagia: 2/2 prolonged intubation. NPO per SLP. Not tolerating tube feeds overnight. Currently NPO. Will attempt Dobhoff tube placement today. Consult GI for PEG placement. 11. Hypoglycemia: 2/2 to poor po intake unable to tolerate tube feeds per above. 12. Bilateral Pleural Effusion:   Associated volume overload in context of renal failure. Interval enlargement noted on CT 11/1/21. Currently on CVVH for effusions. Plan for chest tube insertion if no improvement  13. Suspected COPD: current every day smoker. Obstructive process noted on flow volume loop on ventilator. Started empiric therapy with Stiolto. Recommend formal PFT once improved  14. PAH/chronic RV failure NYHA II: EF 55 to 60% G2 DD TTE 5/4/2021. RHC showed [place] 30 with elevated PCWP. Treated w/ Riociguat. 15. Normocytic Anemia: 2/2 hematuria s/p perc tube placement w/ Iron studies consistent w/  MERCEDES vs Anemia chronic disease. B12 WNL, Folate low, Abs reticulocyte index 11/18/21 0.49% Soluable transferrin 139. Calculated Iron deficit 827mg. Will start Venofer replace folate once ABx stopped  16. Cholelithiasis: w/ dilated CBD. Noted on 10/30/2021 US liver/GB. GI following. Does not suspect choledocolithiasis at this time. May consider HIDA scan once stable   17. Leukocytosis: 2/2 to pulse dose steroids per above  18. Severe malnutrition: Pre-albumin 14.4 w/ notable cachexia. 19. Chronic tobacco use:   cessation  20. Mild AS: Noted on previous TTE not appreciated on repeat imaging  21. Gout:  W/o exacerbation. Continue allopurinol  22. BARBER: non compliant w/ CPAP  23. New onset atrial fibrillation with rapid ventricular response (resolved): now in NSR s/p DCCV 11/3/21 Stopped Heparin per hematuria w/ anemia requiring transfusion & and now in NSR. Stopped Amiodarone 11/8/21. Holding metoprolol 2/2 hypotension  24.  Hyperkalemia (resolved): 2/2 ALVIN. Now worsening following cessation of CVVH  25. Hyponatremia (resolved): Suspect 2/2 renal failure. 26. Severe bilateral multiorganism pneumonia (resolved): PCR positive Staph w/ MECA gene and adenovirus consistent w/ MRSA colonization. Culture growing MSSA. Repeat culture 11/5/21 shows coag positive staph completed 14 days Vancomycin (started 10/28/21) and completed 5 days Rocephin Clindamycin started for coverage of PVL as patient clinically worsening following 14 days Vancomycin for MRSA coverage. Underwent bronchoscopy for left mainstem mucus plug noted CXR on 11/11/21. Switched to Clindamycin for coverage of PVL received 8 days therapy (started 11/11/21). PNA panel from 11/15/21 negative. Repeat cultures show no growth  27. Nutrition: on TF @30 ml       INITIAL H AND P AND ICU COURSE:  71F admitted [facility] 10/27/21 w/ SOB. Current smoker w/ PMH PAH grp 3, HFpEF, stage III sacral ulcer s/p wound ostomy 9/21. Patient sister reports SpO2 51% at home and was brought to ED where ABG showed pH 7.19, PCO2 80, PO2 134. She required emergent intubation and was transferred to ICU. Workup revealed left sided pleural effusion and underwent US guided thoracentesis w/ 1100 cc removed consistent w/ MRSA/adenovirus. Patient was noted to be in afib/RVR and was placed on amio, heparin drip. Patient was also noted to have normocytic anemia and received 1 unit PRBC 10/31/21. CT abdomen revealed CBD dilation w/ cholelithiasis. 11/1/2021: Plan for nephrostomy tubes today. Hemoglobin 7.7 s/p 1 unit PRBC yesterday. Weaned to 4 mics Levophed. 11/2/2021: Patient resumed back on prior dose of vancomycin. Low urine output w/ increasing pressor requirements today    11/3/2021: Will attempt SBT if able to wean today. Increased Amio drip 2/2 worsening tachycardia. Continued hematuria w/ low urine output. Cr stable. Will evaluate UTI following perc tube placement. Rise in WBC noted.  Culture pending    11/4/2021: Cardioversion on 11/3/2021. Now and NSR. CVP 29 this a.m. Suspect drop in CI 2/2 to stopping levophed. Diffuse edema following transfusion overnight. Given one-time dose of 2 mg Bumex and albumin. SBT this AM    11/5/2021: Failed SBT yesterday. Continued low urine output. Trial of Bumex today. Will proceed with dialysis if fails to improve. 11/6/2021: Started on CRRT, continues to have bloody drainage of nephrostomy tube given 1 unit PRBC, cefipime emperically pending cultures of nephrostomy tube fluid and repeat resp cultures. 11/7/21: Patient remains clinically well on exam.  We'll continue medical of UF with CRRT. On empiric cefepime for coverage of perinephric abscess noted on CT abdomen and pelvis November 4, 2021. White count trending down. Will attempt spontaneous breathing trial in a.m.    11/8/21: Transfused 1x PRBC this AM. Anemia workup pending. WBC trending down. Patient ventilator settings this AM preclude SBT. Volume overloaded on exam. UF increased to 125 cc/hr this AM. Will attempt to wean vent settings further further. Coag neg staph growing on nephrostomy tube aspirate. Suspect contamination. Continue existing ABx    11/9/21: Pulse dose steroids initiated overnight. Started on SSI. Urine output now improving 75cc/hr. Transfused 3 units PRBCs yesterday for Improve DO2. MERCEDES noted w/ 875 mg deficit. Will replace once steroids/ABx completed. 11/10/21 Extubated overnight. SLP eval today. Continue BiPAP while asleep w/ transition to NC while awake as tolerated. No tachypnea. Day 3 pulse dose steroids. Continued bloody drainage from [facility]rostomy tube. [facility]     11/11/21 Worsening bilateral infiltrates with SpO2 <90% on HFNC. Continued pulmonary hygiene w/ nebulized hypertonic saline, acapella and breathing treatments in addition to deep suctioning. Continued on Vancomycin for MRSA PNA. Plan for family meeting this AM to discuss goals of care.      11/12/21 Follow-up discussion from family meeting. Patient nodded agreement with full code status including reintubation leading to tracheostomy and PEG placement and continued hemodialysis if needed. Patient nodded agreement and understanding with these decisions. CXR yesterday evening did show left mainstem mucus plugging with cutoff sign and absent breath sounds and did undergo emergent bronchoscopy overnight on 11/12/21. Diminished breath sounds and worsening respiratory status this AM concerning for continued plugging. Stat chest X-ray ordered for confirmation. 11/13/21: Respiratory status continues to decline, she is requiring BiPAP with FiO2 of 70%. Chest x-ray shows complete opacification of the left lung secondary to mucous plugging, also the patient has left-sided pleural effusion. Plan for bronchoscopy. 11/15/2021: Family meeting today per event note. Reintubated today after failed BiPAP    11/16/2021: will dc abx tomorrow. PNA panel negative. Plan for tracheostomy time permitting today. Weaning phenylephrine as tolerated. 11/17/2021: Necrotic Stage IV pressure ulcer probing to bone noted this AM. CT abdomen and pelvis pending to evaluate for abscess. Will discuss with family today when available. 11/18/2021: CT showed bony involvement of sacral ulcer w/ concern for osteomyelitis. Wound ostomy following. Culture pending. Started on broad spectrum therapy with Vancomycin and Zosyn for coverage of suspect osteomyelitis. Weaning sedation as tolerated    11/19/21: Wound culture pending. On broad spectrum ABx for coverage of osteomyelitis. Will de-escalate pending culture/sensitiivity. Consult to GI for PEG tube evaluation. Past Medical History:  Per HPI. Family History:  DM in father and sister. Social History: chronic smoker. ROS   Patient reports fatigue, coughing.   A 12 point review of systems was otherwise negative    Scheduled Meds:   sodium chloride (Inhalant)  4 mL Nebulization Q6H    And    albuterol  2.5 mg Nebulization Q6H    lactobacillus  1 capsule Per NG tube Daily with breakfast    piperacillin-tazobactam  3,375 mg IntraVENous Q12H    vancomycin (VANCOCIN) intermittent dosing (placeholder)   Other RX Placeholder    chlorhexidine  15 mL Mouth/Throat BID    ketamine  100 mg IntraVENous Once    lidocaine 1 % injection  5 mL IntraDERmal Once    insulin lispro  0-6 Units SubCUTAneous Q6H    allopurinol  100 mg Oral Daily    midodrine  10 mg Oral Q8H    phosphorus replacement protocol   Other RX Placeholder    potassium bicarb-citric acid  40 mEq Oral Once    tiotropium-olodaterol  2 puff Inhalation Daily    [Held by provider] aspirin  81 mg Oral Daily    pantoprazole  40 mg IntraVENous QAM    sodium hypochlorite   Irrigation Daily    Riociguat  2.5 mg Oral Q8H    sodium chloride flush  5-40 mL IntraVENous 2 times per day    FLUoxetine  40 mg Oral Daily    [Held by provider] metoprolol tartrate  12.5 mg Oral BID     Continuous Infusions:   dextrose      sodium chloride      midazolam      fentaNYL 500 mcg in sodium chloride 0.9% 100 ml infusion Stopped (11/18/21 0100)    phenylephrine (KRISTAL-SYNEPHRINE) 50mg/250mL infusion 40 mcg/min (11/19/21 0630)    dextrose      dexmedetomidine 0.4 mcg/kg/hr (11/19/21 0600)    sodium chloride Stopped (11/17/21 0709)    [Held by provider] sodium chloride 50 mL/hr at 10/28/21 2314       PHYSICAL EXAMINATION:  T:  98.8  P: 79 RR:  10. B/P: 110/46 FiO2: 30. O2 Sat:  98%. I/O: 2557/435 net +2122  Body mass index is 35.54 kg/m². GCS:  15  PC: 16 PEEP: 6: TV: 380: RRTotal: 15: General: Acute on chronically ill-appearing elderly white female  HEENT: Temporal wasting appreciated. Normocephalic and atraumatic. No scleral icterus. PERR  Neck: supple. No Thyromegaly. Left subclavian dialysis catheter   Lungs: Mild rhonchi appreciated in lower lung fields  No retractions  Cardiac: RRR. No JVD. Abdomen: soft. Nontender. ,npehrostomy tube with bloody drainage  Extremities:  +2 pitting edema x4. (interval improvement noted) No clubbing, cyanosis   Vasculature: capillary refill < 3 seconds. Palpable dorsalis pedis pulses. Skin:  warm and dry. Psych: Alert and oriented to person place and time, affect appropriate  Lymph:  No supraclavicular adenopathy. Neurologic:  No focal deficit. No seizures. Data: (All radiographs, tracings, PFTs, and imaging are personally viewed and interpreted unless otherwise noted).  CMP: Sodium 132 potassium 5.6 chloride 99 BUN/creatinine 45/2.6 bicarb 22 anion gap 11 magnesium 2.0 glucose 85 calcium 8.3 phosphorus 5.4 albumin 2.5 alk phos 112 ALT/AST 14/21 total bilirubin 0.5    CBC WBC 10.0 H&H 7.9/25.5 platelets 257   SARS-CoV-2 NAAT negative on 10/27/2021   Telemetry shows NSR   Chest x-ray 10/31/2021 demonstrated diffuse bilateral patchy opacities with left-sided pleural effusion and cardiomegaly   Respiratory culture  11/5/21 pending   Pneumonia panel collected 10/27/2021 demonstrates staph aureus. MEC-A gene and adenovirus   CT chest on 11/1/2021 demonstrates bilateral pleural effusions with interval enlargement   CXR 11/3/2021 demonstrates worsening pulmonary congestion with bilateral effusions   UA shows bacteria w/ moderate white cells   CT Abdomen and pelvis 11/5/21 showed perinephric stranding with left-sided hypodense nodule with air-fluid level concerning for abscess. Diffuse ascites and anasarca noted.  CXR November 7, 2021 shows bilateral pulmonary edema with worsening right-sided infiltrate   CXR November 9, 2021 shows improvement in bilateral pulmonary edema with worsening left sided infiltrate    Repeat CXR 11/12/21 pending at time of evaluation.     Chest x-ray on 11/19/2021 demonstrates shows worsening bilateral effusions compared to previous films         Meets Continued ICU Level Care Criteria:    [x] Yes   [] No - Transfer Planned to listed location:  [] HOSPITALIST CONTACTED-  Electronically signed by Laly Kelley DO PGY-2 on 11/19/2021 at 9:08 AM    Patient seen by me. Case discussed with resident physician. Patient status post tracheostomy tube placement. Continues to require mechanical ventilator support. Dialysis per nephrology. Patient with ANCA associated vasculitis. Currently not a candidate for TPE. Very ill. As a result, renal biopsy has been delayed. .  Italicized font represents changes to the note made by me. CC time 35 minutes. Time was discontiguous. Time does not include procedures. Time does include my direct assessment of the patient and coordination of care.   Electronically signed by Rolando Gipson MD on 11/19/2021 at 10:59 AM

## 2021-11-19 NOTE — OP NOTE
Operative Note      Patient: Cee Dueñas  YOB: 1950  MRN: [id number]    Date of Procedure:     Pre-Op Diagnosis: * No surgery found *    Post-Op Diagnosis: Same:   Old blood in stomach noted. ( Unable to visualize ampulla with forward viewing scope - ERCP scope introduced)   Ulcer noted at sphincterotomy site with 2 areas noted with small amount of blood. Both injected with epinephrine,  No further bleeding. Keep NPO; Continue IV Protonix and IV Octreotide. If re bleeds; Will need IR to control bleeding. * Surgery not found *    Assistant:   * Surgery not found *    Anesthesia: * No surgery found *    Estimated Blood Loss (mL): Minimal    Complications: None    Specimens:   * Cannot find log *    Implants:  * No surgical log found *      Drains:   Nephrostomy 1 Left 8 fr (Active)   Site Assessment Other (Comment) 11/19/21 0600   Dressing Status Clean; Dry; Intact 11/19/21 0800   Dressing Type Split gauze 11/19/21 0800   Urine Color Red 11/19/21 0800   Urine Appearance Clots 11/19/21 0800   Output (mL) 40 mL 11/19/21 0600       NG/OG/NJ/NE Tube Nasoenteric feeding tube Right nostril (Active)       Rectal Tube (Active)   Stool Appearance Loose 11/19/21 1200   Stool Color Brown 11/19/21 1200   Stool Amount Small 11/19/21 1200   Rectal Tube Output 100 ml 11/19/21 0600       Urethral Catheter  22 fr (Active)   $ Urethral catheter insertion $ Not inserted for procedure 11/04/21 0200   Catheter Indications Need for fluid volume management of the critically ill patient in a critical care setting 11/19/21 1200   Site Assessment [place] 11/19/21 1200   Urine Color Ne 11/19/21 1200   Urine Appearance Hazy 11/19/21 1200   Output (mL) 150 mL 11/19/21 0600       [REMOVED] NG/OG/NJ/NE Tube 18 fr Right mouth (Removed)   Surrounding Skin Dry;  Intact 11/09/21 0800   Securement device Yes 11/09/21 0800   Status Other (Comment) 11/09/21 0800   Placement Verified by External Catheter Length 11/09/21 0800 NG/OG/NJ/NE External Measurement (cm) 52 cm 11/09/21 0800   Drainage Appearance [place]; Milky 11/09/21 0800   Tube Feeding Renal Formula 11/09/21 0800   Tube Feeding Status Continuous 11/09/21 0800   Rate/Schedule 25 mL/hr 11/09/21 0800   Tube Feeding Supplement Amount (mL) 80 11/04/21 0447   Tube Feeding Intake (mL) 15 ml 11/09/21 1400   Free Water Flush (mL) 30 mL 11/08/21 0342   Free Water Rate 0 11/03/21 2019   Residual Volume (ml) 0 ml 11/09/21 0800   Output (mL) 0 ml 10/31/21 0415       [REMOVED] NG/OG/NJ/NE Tube Nasogastric 16 fr Right nostril (Removed)   Surrounding Skin Dry;  Intact 11/19/21 0800   Securement device Yes 11/19/21 0800   Status Clamped 11/19/21 0800   Placement Verified by External Catheter Length 11/19/21 0800   Drainage Appearance Bile 11/19/21 0600   Tube Feeding Renal Formula 11/18/21 0615   Tube Feeding Status Stopped 11/18/21 0630   Rate/Schedule 30 mL/hr 11/17/21 2100   Tube Feeding Intake (mL) 270 ml 11/18/21 0630   Free Water Flush (mL) 40 mL 11/19/21 0600   Free Water Rate Q4H 11/17/21 2100   Residual Volume (ml) 0 ml 11/19/21 0600   Output (mL) 0 ml 11/19/21 0600       [REMOVED] Urethral Catheter Straight-tip 16 fr (Removed)   Catheter Indications Need for fluid volume management of the critically ill patient in a critical care setting 11/04/21 0000   Site Assessment Pink 11/04/21 0000   Urine Color Cherry; Bloody 11/04/21 0000   Urine Appearance Clots 11/04/21 0000   Urine Odor Malodorous 10/31/21 0400   Output (mL) 225 mL 11/04/21 0000       [REMOVED] Urethral Catheter Temperature probe 16 fr (Removed)       Findings: above    Detailed Description of Procedure:   dictated    Electronically signed by Jay Lester MD on 11/19/2021 at 1:49 PM

## 2021-11-19 NOTE — PROGRESS NOTES
Gastroenterology Progress Note:     Patient Name:  Erin Matias   MRN: [id number]  [id number]  YOB: 1950  Admit Date: 10/27/2021  1:36 PM  Primary Care Physician: Rekha Alexander MD   [id number]     Patient seen and examined. 24 hours events and chart reviewed. Subjective: Reconsult for consideration of PEG tube placement. Patient nods head but does not speak due to tracheostomy. Denies Abdominal Pain. Case discussed with RN who provided history along with chart review. No family at bedside. Patient not tolerating NGT, thought to be 2/2 gag reflux/sensitive to tube presence. NGT removed, Dobbhoff placed for decreased tube size but continued to have N/V. RN states N/V have worsened since tracheostomy. Lidocaine breathing treatments have helped. Patient remains full code and per RN family wants all aggressive measures taken. She has large sacral wound that was present prior to arrival but has worsened and now with concern for osteomyelitis, not a candidate for surgery, on Zosyn and Vancomycin. Objective:  BP (!) 114/45   Pulse 80   Temp 98.4 °F (36.9 °C) (Oral)   Resp 16   Ht 4' 9\" (1.448 m)   Wt 164 lb 3.9 oz (74.5 kg)   SpO2 97%   BMI 35.54 kg/m²     Physical Exam:    General:  Acute on chronically ill appearing female. Awake, eyes open, nods to some questions. HEENT: Atraumatic, normocephalic. Dobbhoff in place, clamped  Neck: Supple without adenopathy, JVD, thyromegaly or masses. Tracheostomy present. CV: Heart RRR, no murmurs, rubs, gallops. Resp: Diminished, clear, on ventilator support   Abd: Round, soft, nontender. No hepatosplenomegaly or mass present. Active bowel sounds heard. No distention noted. Ext:  Without cyanosis, clubbing. +edema.    Skin: Pallor, warm, dry  Neuro:  Alert, awake, unable to speak 2/2 tracheostomy     Rectal: deferred    Labs:   CBC:   Lab Results   Component Value Date    WBC 10.0 11/19/2021    HGB 7.9 11/19/2021    HCT 25.5 11/19/2021    MCV 98.4 11/19/2021     11/19/2021     BMP:   Lab Results   Component Value Date     11/19/2021    K 5.6 11/19/2021    K 4.8 11/17/2021    CL 99 11/19/2021    CO2 22 11/19/2021    PHOS 5.4 11/19/2021    BUN 45 11/19/2021    CREATININE 2.6 11/19/2021    CALCIUM 8.3 11/19/2021     PT/INR:   Lab Results   Component Value Date    INR 1.02 11/18/2021     LFTs:   Lab Results   Component Value Date    ALKPHOS 112 11/19/2021    ALT 14 11/19/2021    AST 21 11/19/2021    BILITOT 0.5 11/19/2021    BILIDIR 0.3 03/06/2020    LABALBU 2.5 11/19/2021     Significant Diagnostic Studies:   11/19/21 0053 Chest Xray  Impression   1. Mild hepatomegaly. Left subclavian dialysis catheter with tip at cavoatrial junction. NG tube passes into stomach. Tracheostomy tube in good position. Right jugular line with catheter tip in right atrium. 2. Tiny bilateral pleural effusions. Moderate pneumonia/pulmonary edema scattered relatively diffusely in both lungs. 3. Overall appearance of chest has worsened somewhat since prior.            Current Meds:  Scheduled Meds:   sodium chloride (Inhalant)  4 mL Nebulization Q6H    And    albuterol  2.5 mg Nebulization Q6H    lactobacillus  1 capsule Per NG tube Daily with breakfast    piperacillin-tazobactam  3,375 mg IntraVENous Q12H    vancomycin (VANCOCIN) intermittent dosing (placeholder)   Other RX Placeholder    chlorhexidine  15 mL Mouth/Throat BID    ketamine  100 mg IntraVENous Once    lidocaine 1 % injection  5 mL IntraDERmal Once    insulin lispro  0-6 Units SubCUTAneous Q6H    allopurinol  100 mg Oral Daily    midodrine  10 mg Oral Q8H    phosphorus replacement protocol   Other RX Placeholder    potassium bicarb-citric acid  40 mEq Oral Once    tiotropium-olodaterol  2 puff Inhalation Daily    [Held by provider] aspirin  81 mg Oral Daily    pantoprazole  40 mg IntraVENous QAM    sodium hypochlorite   Irrigation Daily    Riociguat  2.5 mg Oral Q8H    sodium chloride flush  5-40 mL IntraVENous 2 times per day    FLUoxetine  40 mg Oral Daily    [Held by provider] metoprolol tartrate  12.5 mg Oral BID     Continuous Infusions:   dextrose 30 mL/hr at 11/19/21 0951    sodium chloride      midazolam      fentaNYL 500 mcg in sodium chloride 0.9% 100 ml infusion Stopped (11/18/21 0100)    phenylephrine (KRISTAL-SYNEPHRINE) 50mg/250mL infusion 30 mcg/min (11/19/21 0910)    dextrose      dexmedetomidine 0.4 mcg/kg/hr (11/19/21 0600)    sodium chloride Stopped (11/17/21 0709)    [Held by provider] sodium chloride 50 mL/hr at 10/28/21 2314       Assessment:    72 yo F we are now seeing for consideration of PEG tube placement. PMH includes CHF, PAH, HTN, CKD3, chronic normocytic anemia, hx of gout and chronic depression. Admitted to [facility] 10/27/21 for acute hypoxic hypercapnic respiratory failure 2/2 pneumonia. Intubated 10/27, extubated 11/9 then tracheostomy placement 11/17. S/p bronch with mucous plug removal 11/15. Developed septic shock. Has stage IV decubitus ulcer with suspected osteomyelitis, GenSurg consulted without indications for debridement 11/17, on Vancomycin and Zosyn. Noted to have ALVIN on CKD3, has required dialysis on and off. We saw on 10/31 for concern of dilated CBD without evidence of choledocholithiasis, rec consider HIDA once stable. Reconsult 11/19/21 for consideration of PEG placement as no longer tolerating NGT which was removed and Dobbhoff placed 11/19 with continued N/V with any use of tube. Lidocaine aerosol has helped. 1. Acute respiratory failure  2. S/p Tracheostomy placement  3. Malnutrition  4. N/V  5. Pneumonia  6. Septic Shock  7. Stage IV decubitus ulcer with suspected osteomyelitis, not a surgical candidate  8. ALVIN on CKD3- s/p CRRT and intermittent CVVH which was held 11/15  9. ANCA associated vasculitis  10. Hydronephrosis  11. Anemia- without s/s of active GI bleeding- stable  12. Thrombocytopenia  13. Cholelithiasis  14.  Chronic cigarette nicotine dependence    Plan:     Monitor H&H   Monitor stools   Discussed case with RN   Extensively reviewed with Dr. Todd Barthel. Would like to see use of the Dobbhoff with assistance of Reglan and lidocaine treatments to assess patient's tolerance to tube feeds.  Start Reglan   Will plan to see Monday to re-evaluate possible PEG placement next week pending response to addition of Reglan.  Call in the interim if needed.      Case reviewed and impression/plan reviewed in collaboration with Dr. Todd Barthel  Electronically signed by LANE Lewis CNP on 11/19/2021 at 12:10 PM    [facility]

## 2021-11-19 NOTE — PROGRESS NOTES
[facility]         Renal Inpatient Follow-Up note         11/19/2021 11:33 AM    Pt Name:   Cozette Dance  YOB: 1950  Attending:   Edyta Saab MD    Chief Complaint : Cozette Dance is a 73 y.o. female being followed by nephrology for ALVIN/CKD    Interval History :   Patient seen and examined by me. No distress  Patient has a trach now not much communicative cannot assess history or review of systems  Mild Urine output .   Continues to be on pressors, but improved   She is on 30% FiO2     Scheduled Medications :    sodium chloride (Inhalant)  4 mL Nebulization Q6H    And    albuterol  2.5 mg Nebulization Q6H    lactobacillus  1 capsule Per NG tube Daily with breakfast    piperacillin-tazobactam  3,375 mg IntraVENous Q12H    vancomycin (VANCOCIN) intermittent dosing (placeholder)   Other RX Placeholder    chlorhexidine  15 mL Mouth/Throat BID    ketamine  100 mg IntraVENous Once    lidocaine 1 % injection  5 mL IntraDERmal Once    insulin lispro  0-6 Units SubCUTAneous Q6H    allopurinol  100 mg Oral Daily    midodrine  10 mg Oral Q8H    phosphorus replacement protocol   Other RX Placeholder    potassium bicarb-citric acid  40 mEq Oral Once    tiotropium-olodaterol  2 puff Inhalation Daily    [Held by provider] aspirin  81 mg Oral Daily    pantoprazole  40 mg IntraVENous QAM    sodium hypochlorite   Irrigation Daily    Riociguat  2.5 mg Oral Q8H    sodium chloride flush  5-40 mL IntraVENous 2 times per day    FLUoxetine  40 mg Oral Daily    [Held by provider] metoprolol tartrate  12.5 mg Oral BID      dextrose 30 mL/hr at 11/19/21 0951    sodium chloride      midazolam      fentaNYL 500 mcg in sodium chloride 0.9% 100 ml infusion Stopped (11/18/21 0100)    phenylephrine (KRISTAL-SYNEPHRINE) 50mg/250mL infusion 30 mcg/min (11/19/21 0910)    dextrose      dexmedetomidine 0.4 mcg/kg/hr (11/19/21 0600)    sodium chloride Stopped (11/17/21 0709)   Suhas Lei by provider] sodium chloride 50 mL/hr at 10/28/21 2314       Vitals :  BP (!) 114/45   Pulse 80   Temp 98.4 °F (36.9 °C) (Oral)   Resp 16   Ht 4' 9\" (1.448 m)   Wt 164 lb 3.9 oz (74.5 kg)   SpO2 97%   BMI 35.54 kg/m²     24HR INTAKE/OUTPUT:      Intake/Output Summary (Last 24 hours) at 11/19/2021 1133  Last data filed at 11/19/2021 0600  Gross per 24 hour   Intake 1493.8 ml   Output 700 ml   Net 793.8 ml     Last 3 weights  Wt Readings from Last 3 Encounters:   11/16/21 164 lb 3.9 oz (74.5 kg)   10/25/21 164 lb (74.4 kg)   09/29/21 160 lb (72.6 kg)           Physical Exam :  General Appearance:  Well developed. No distress  Mouth/Throat:  Oral mucosa moist.  ET tube in place  Neck:  Supple, no JVD  Lungs:  Breath sounds: clear, diminished  Heart[de-identified]  S1,S2 heard  Abdomen:  Soft, non - tender  Musculoskeletal:  Edema - noted but appears better         Last 3 CBC   Recent Labs     11/17/21  0320 11/17/21  0320 11/18/21  0235 11/19/21  0400 11/19/21  0900   WBC 13.1*  --  11.1* 10.0  --    RBC 3.13*  --  3.06* 2.45*  --    HGB 9.4*   < > 9.1* 7.3* 7.9*   HCT 29.2*   < > 29.2* 24.1* 25.5*   PLT 94*  --  118* 113*  --     < > = values in this interval not displayed. Last 3 CMP  Recent Labs     11/17/21  0320 11/18/21  0235 11/19/21  0400    133* 132*   K 4.8 5.2 5.6*    102 99   CO2 24 22* 22*   BUN 32* 39* 45*   CREATININE 1.5* 2.0* 2.6*   CALCIUM 8.1* 7.9* 8.3*   LABALBU 2.5* 2.5* 2.5*   BILITOT 0.8 0.5 0.5             ASSESSMENT / Plan   1. Renal -acute kidney injury, multifactorial etiology which includes hypotension from sepsis, IV contrast exposure on 10/29 and has some hydronephrosis as well . ? Patient's creatinine continued to get worse, but primarily fluid status worsened, did not respond to diuretics so started her on CVVH on 11/5/2021 . Since then she has been on it on and off  ? No acute need for dialysis today  ? Closely monitor labs and reevaluate in the morning     2.  Electrolytes - mild hyponatremia follow for now this might worsen now that she is on 10% dextrose  3. Hyperkalemia - follow lokelma once dobhoff in place  4. Hypoglycemia - swith to D10 to reduce volume and titrate as per blood sugars  5. Mild acidosis improved with CVVH  6. Acute hypercapnic respiratory failure currently ventilator dependent now with a trach  7. Pneumonia with pleural effusion and septic shock  8. Hypotension on pressors wean to MAP greater than 65. Also on midodrine. Increase to 15  9. Hydronephrosis status post nephrostomy tube placement  10. + ANCA, patient has been having some proteinuria as an outpatient and during my last visit have ordered all the serologic work-up , which the patient has done prior to getting admitted to the hospital however the ANCA was not done at that time. She may be having some renal pathology going on , but I doubt it has anything to do with acute renal dysfunction during this admission. However she has received steroids. Once clinically stable might consider a renal biopsy. 11. Meds reviewed and discussed with  nursing staff and family. Dr. Italo Berry MD, M,D.  [facility].

## 2021-11-20 LAB
ALBUMIN SERPL-MCNC: 2.7 G/DL (ref 3.5–5.1)
ALP BLD-CCNC: 117 U/L (ref 38–126)
ALT SERPL-CCNC: 15 U/L (ref 11–66)
ANION GAP SERPL CALCULATED.3IONS-SCNC: 14 MEQ/L (ref 8–16)
AST SERPL-CCNC: 16 U/L (ref 5–40)
BASOPHILS # BLD: 0.9 %
BASOPHILS ABSOLUTE: 0.1 THOU/MM3 (ref 0–0.1)
BILIRUB SERPL-MCNC: 0.8 MG/DL (ref 0.3–1.2)
BUN BLDV-MCNC: 55 MG/DL (ref 7–22)
CALCIUM IONIZED: 1.15 MMOL/L (ref 1.12–1.32)
CALCIUM SERPL-MCNC: 8.2 MG/DL (ref 8.5–10.5)
CHLORIDE BLD-SCNC: 97 MEQ/L (ref 98–111)
CO2: 20 MEQ/L (ref 23–33)
CREAT SERPL-MCNC: 3.1 MG/DL (ref 0.4–1.2)
EOSINOPHIL # BLD: 1.1 %
EOSINOPHILS ABSOLUTE: 0.1 THOU/MM3 (ref 0–0.4)
ERYTHROCYTE [DISTWIDTH] IN BLOOD BY AUTOMATED COUNT: 17.3 % (ref 11.5–14.5)
ERYTHROCYTE [DISTWIDTH] IN BLOOD BY AUTOMATED COUNT: 59.7 FL (ref 35–45)
GFR SERPL CREATININE-BSD FRML MDRD: 15 ML/MIN/1.73M2
GLUCOSE BLD-MCNC: 110 MG/DL (ref 70–108)
GLUCOSE BLD-MCNC: 118 MG/DL (ref 70–108)
GLUCOSE BLD-MCNC: 119 MG/DL (ref 70–108)
GLUCOSE BLD-MCNC: 133 MG/DL (ref 70–108)
GLUCOSE BLD-MCNC: 96 MG/DL (ref 70–108)
GLUCOSE BLD-MCNC: 98 MG/DL (ref 70–108)
HBV SURFACE AB TITR SER: POSITIVE {TITER}
HCT VFR BLD CALC: 28.6 % (ref 37–47)
HEMOGLOBIN: 8.8 GM/DL (ref 12–16)
HEPATITIS B CORE TOTAL ANTIBODY: NONREACTIVE
HEPATITIS B SURFACE ANTIGEN: NEGATIVE
IMMATURE GRANS (ABS): 0.04 THOU/MM3 (ref 0–0.07)
IMMATURE GRANULOCYTES: 0.5 %
LYMPHOCYTES # BLD: 6.1 %
LYMPHOCYTES ABSOLUTE: 0.5 THOU/MM3 (ref 1–4.8)
MAGNESIUM: 2 MG/DL (ref 1.6–2.4)
MCH RBC QN AUTO: 29.3 PG (ref 26–33)
MCHC RBC AUTO-ENTMCNC: 30.8 GM/DL (ref 32.2–35.5)
MCV RBC AUTO: 95.3 FL (ref 81–99)
MONOCYTES # BLD: 8.3 %
MONOCYTES ABSOLUTE: 0.7 THOU/MM3 (ref 0.4–1.3)
NUCLEATED RED BLOOD CELLS: 0 /100 WBC
PHOSPHORUS: 5.6 MG/DL (ref 2.4–4.7)
PLATELET # BLD: 113 THOU/MM3 (ref 130–400)
PMV BLD AUTO: 10.1 FL (ref 9.4–12.4)
POTASSIUM SERPL-SCNC: 5.3 MEQ/L (ref 3.5–5.2)
RBC # BLD: 3 MILL/MM3 (ref 4.2–5.4)
SEG NEUTROPHILS: 83.1 %
SEGMENTED NEUTROPHILS ABSOLUTE COUNT: 7.2 THOU/MM3 (ref 1.8–7.7)
SODIUM BLD-SCNC: 131 MEQ/L (ref 135–145)
TOTAL PROTEIN: 5 G/DL (ref 6.1–8)
VANCOMYCIN RANDOM: 18.7 UG/ML (ref 0.1–39.9)
WBC # BLD: 8.7 THOU/MM3 (ref 4.8–10.8)

## 2021-11-20 PROCEDURE — 90935 HEMODIALYSIS ONE EVALUATION: CPT

## 2021-11-20 PROCEDURE — 83735 ASSAY OF MAGNESIUM: CPT

## 2021-11-20 PROCEDURE — 85025 COMPLETE CBC W/AUTO DIFF WBC: CPT

## 2021-11-20 PROCEDURE — 82948 REAGENT STRIP/BLOOD GLUCOSE: CPT

## 2021-11-20 PROCEDURE — 6360000002 HC RX W HCPCS: Performed by: PHARMACIST

## 2021-11-20 PROCEDURE — 36415 COLL VENOUS BLD VENIPUNCTURE: CPT

## 2021-11-20 PROCEDURE — 99233 SBSQ HOSP IP/OBS HIGH 50: CPT | Performed by: INTERNAL MEDICINE

## 2021-11-20 PROCEDURE — 6370000000 HC RX 637 (ALT 250 FOR IP): Performed by: INTERNAL MEDICINE

## 2021-11-20 PROCEDURE — 6370000000 HC RX 637 (ALT 250 FOR IP): Performed by: NURSE PRACTITIONER

## 2021-11-20 PROCEDURE — 6370000000 HC RX 637 (ALT 250 FOR IP): Performed by: STUDENT IN AN ORGANIZED HEALTH CARE EDUCATION/TRAINING PROGRAM

## 2021-11-20 PROCEDURE — 86704 HEP B CORE ANTIBODY TOTAL: CPT

## 2021-11-20 PROCEDURE — 2580000003 HC RX 258: Performed by: EMERGENCY MEDICINE

## 2021-11-20 PROCEDURE — 84100 ASSAY OF PHOSPHORUS: CPT

## 2021-11-20 PROCEDURE — 80202 ASSAY OF VANCOMYCIN: CPT

## 2021-11-20 PROCEDURE — 94640 AIRWAY INHALATION TREATMENT: CPT

## 2021-11-20 PROCEDURE — 87340 HEPATITIS B SURFACE AG IA: CPT

## 2021-11-20 PROCEDURE — C9113 INJ PANTOPRAZOLE SODIUM, VIA: HCPCS | Performed by: NURSE PRACTITIONER

## 2021-11-20 PROCEDURE — 2580000003 HC RX 258: Performed by: STUDENT IN AN ORGANIZED HEALTH CARE EDUCATION/TRAINING PROGRAM

## 2021-11-20 PROCEDURE — 86706 HEP B SURFACE ANTIBODY: CPT

## 2021-11-20 PROCEDURE — 94003 VENT MGMT INPAT SUBQ DAY: CPT

## 2021-11-20 PROCEDURE — 2580000003 HC RX 258: Performed by: INTERNAL MEDICINE

## 2021-11-20 PROCEDURE — 6370000000 HC RX 637 (ALT 250 FOR IP): Performed by: FAMILY MEDICINE

## 2021-11-20 PROCEDURE — 2500000003 HC RX 250 WO HCPCS: Performed by: INTERNAL MEDICINE

## 2021-11-20 PROCEDURE — 6360000002 HC RX W HCPCS: Performed by: INTERNAL MEDICINE

## 2021-11-20 PROCEDURE — 2000000000 HC ICU R&B

## 2021-11-20 PROCEDURE — 6360000002 HC RX W HCPCS: Performed by: STUDENT IN AN ORGANIZED HEALTH CARE EDUCATION/TRAINING PROGRAM

## 2021-11-20 PROCEDURE — 6360000002 HC RX W HCPCS: Performed by: NURSE PRACTITIONER

## 2021-11-20 PROCEDURE — 82330 ASSAY OF CALCIUM: CPT

## 2021-11-20 PROCEDURE — 80053 COMPREHEN METABOLIC PANEL: CPT

## 2021-11-20 RX ORDER — METOCLOPRAMIDE HYDROCHLORIDE 5 MG/ML
5 INJECTION INTRAMUSCULAR; INTRAVENOUS EVERY 8 HOURS
Status: DISCONTINUED | OUTPATIENT
Start: 2021-11-20 | End: 2021-12-01 | Stop reason: HOSPADM

## 2021-11-20 RX ADMIN — MIDODRINE HYDROCHLORIDE 15 MG: 10 TABLET ORAL at 20:40

## 2021-11-20 RX ADMIN — CHLORHEXIDINE GLUCONATE 15 ML: 1.2 RINSE ORAL at 09:34

## 2021-11-20 RX ADMIN — FLUOXETINE 40 MG: 20 CAPSULE ORAL at 09:34

## 2021-11-20 RX ADMIN — METOCLOPRAMIDE HYDROCHLORIDE 5 MG: 5 INJECTION INTRAMUSCULAR; INTRAVENOUS at 16:53

## 2021-11-20 RX ADMIN — LIDOCAINE HYDROCHLORIDE 4 ML: 40 INJECTION, SOLUTION RETROBULBAR; TOPICAL at 07:57

## 2021-11-20 RX ADMIN — DEXTROSE MONOHYDRATE: 100 INJECTION, SOLUTION INTRAVENOUS at 00:51

## 2021-11-20 RX ADMIN — ALLOPURINOL 100 MG: 100 TABLET ORAL at 09:34

## 2021-11-20 RX ADMIN — SODIUM CHLORIDE, PRESERVATIVE FREE 10 ML: 5 INJECTION INTRAVENOUS at 20:41

## 2021-11-20 RX ADMIN — ALBUTEROL SULFATE 2.5 MG: 2.5 SOLUTION RESPIRATORY (INHALATION) at 01:28

## 2021-11-20 RX ADMIN — Medication 1 CAPSULE: at 09:34

## 2021-11-20 RX ADMIN — ALBUTEROL SULFATE 2.5 MG: 2.5 SOLUTION RESPIRATORY (INHALATION) at 18:17

## 2021-11-20 RX ADMIN — ALBUTEROL SULFATE 2.5 MG: 2.5 SOLUTION RESPIRATORY (INHALATION) at 07:56

## 2021-11-20 RX ADMIN — PIPERACILLIN AND TAZOBACTAM 3375 MG: 3; .375 INJECTION, POWDER, LYOPHILIZED, FOR SOLUTION INTRAVENOUS at 11:42

## 2021-11-20 RX ADMIN — METOCLOPRAMIDE HYDROCHLORIDE 10 MG: 5 INJECTION INTRAMUSCULAR; INTRAVENOUS at 09:34

## 2021-11-20 RX ADMIN — SODIUM HYPOCHLORITE: 1.25 SOLUTION TOPICAL at 16:53

## 2021-11-20 RX ADMIN — TIOTROPIUM BROMIDE AND OLODATEROL 2 PUFF: 3.124; 2.736 SPRAY, METERED RESPIRATORY (INHALATION) at 07:56

## 2021-11-20 RX ADMIN — CHLORHEXIDINE GLUCONATE 15 ML: 1.2 RINSE ORAL at 20:40

## 2021-11-20 RX ADMIN — ALBUTEROL SULFATE 2.5 MG: 2.5 SOLUTION RESPIRATORY (INHALATION) at 12:59

## 2021-11-20 RX ADMIN — METOCLOPRAMIDE HYDROCHLORIDE 10 MG: 5 INJECTION INTRAMUSCULAR; INTRAVENOUS at 05:01

## 2021-11-20 RX ADMIN — LIDOCAINE HYDROCHLORIDE 4 ML: 40 INJECTION, SOLUTION RETROBULBAR; TOPICAL at 12:59

## 2021-11-20 RX ADMIN — SODIUM CHLORIDE SOLN NEBU 3% 4 ML: 3 NEBU SOLN at 18:17

## 2021-11-20 RX ADMIN — PANTOPRAZOLE SODIUM 40 MG: 40 INJECTION, POWDER, FOR SOLUTION INTRAVENOUS at 09:34

## 2021-11-20 RX ADMIN — PIPERACILLIN AND TAZOBACTAM 3375 MG: 3; .375 INJECTION, POWDER, LYOPHILIZED, FOR SOLUTION INTRAVENOUS at 22:38

## 2021-11-20 RX ADMIN — SODIUM CHLORIDE SOLN NEBU 3% 4 ML: 3 NEBU SOLN at 07:56

## 2021-11-20 RX ADMIN — MIDODRINE HYDROCHLORIDE 15 MG: 10 TABLET ORAL at 12:12

## 2021-11-20 RX ADMIN — MIDODRINE HYDROCHLORIDE 15 MG: 10 TABLET ORAL at 05:01

## 2021-11-20 RX ADMIN — SODIUM CHLORIDE SOLN NEBU 3% 4 ML: 3 NEBU SOLN at 01:28

## 2021-11-20 RX ADMIN — SODIUM CHLORIDE, PRESERVATIVE FREE 10 ML: 5 INJECTION INTRAVENOUS at 09:35

## 2021-11-20 RX ADMIN — SODIUM CHLORIDE SOLN NEBU 3% 4 ML: 3 NEBU SOLN at 12:59

## 2021-11-20 ASSESSMENT — PAIN SCALES - GENERAL
PAINLEVEL_OUTOF10: 0
PAINLEVEL_OUTOF10: 0

## 2021-11-20 ASSESSMENT — PULMONARY FUNCTION TESTS
PIF_VALUE: 24
PIF_VALUE: 16
PIF_VALUE: 23
PIF_VALUE: 15

## 2021-11-20 ASSESSMENT — PAIN SCALES - WONG BAKER
WONGBAKER_NUMERICALRESPONSE: 0
WONGBAKER_NUMERICALRESPONSE: 0

## 2021-11-20 NOTE — PROGRESS NOTES
[facility]         Renal Inpatient Follow-Up note         11/20/2021 11:42 AM    Pt Name:   Tabatha Garza  YOB: 1950  Attending:   Lisa Claudio MD    Chief Complaint : Tabatha Garza is a 73 y.o. female being followed by nephrology for ALVIN/CKD    Interval History :   Patient seen and examined by me. No distress  Patient has a trach now not much communicative cannot assess history or review of systems  Mild Urine output . She is on 30% FiO2.   Currently she is off pressors  Blood pressure is doing better     Scheduled Medications :    sodium chloride (Inhalant)  4 mL Nebulization Q6H    And    albuterol  2.5 mg Nebulization Q6H    lidocaine PF  4 mL Inhalation 4x daily    midodrine  15 mg Oral Q8H    metoclopramide  10 mg IntraVENous Q6H    lactobacillus  1 capsule Per NG tube Daily with breakfast    piperacillin-tazobactam  3,375 mg IntraVENous Q12H    vancomycin (VANCOCIN) intermittent dosing (placeholder)   Other RX Placeholder    chlorhexidine  15 mL Mouth/Throat BID    ketamine  100 mg IntraVENous Once    lidocaine 1 % injection  5 mL IntraDERmal Once    insulin lispro  0-6 Units SubCUTAneous Q6H    allopurinol  100 mg Oral Daily    phosphorus replacement protocol   Other RX Placeholder    potassium bicarb-citric acid  40 mEq Oral Once    tiotropium-olodaterol  2 puff Inhalation Daily    [Held by provider] aspirin  81 mg Oral Daily    pantoprazole  40 mg IntraVENous QAM    sodium hypochlorite   Irrigation Daily    Riociguat  2.5 mg Oral Q8H    sodium chloride flush  5-40 mL IntraVENous 2 times per day    FLUoxetine  40 mg Oral Daily    [Held by provider] metoprolol tartrate  12.5 mg Oral BID      dextrose 30 mL/hr at 11/20/21 0557    sodium chloride      midazolam      fentaNYL 500 mcg in sodium chloride 0.9% 100 ml infusion Stopped (11/18/21 0100)    phenylephrine (KRISTAL-SYNEPHRINE) 50mg/250mL infusion Stopped (11/19/21 1438)    dextrose  dexmedetomidine 0.1 mcg/kg/hr (11/20/21 1136)    sodium chloride Stopped (11/17/21 0709)    [Held by provider] sodium chloride 50 mL/hr at 10/28/21 2314       Vitals :  BP (!) 130/52   Pulse 84   Temp 97.9 °F (36.6 °C) (Oral)   Resp 15   Ht 4' 9\" (1.448 m)   Wt 164 lb 3.9 oz (74.5 kg)   SpO2 96%   BMI 35.54 kg/m²     24HR INTAKE/OUTPUT:      Intake/Output Summary (Last 24 hours) at 11/20/2021 1142  Last data filed at 11/20/2021 0935  Gross per 24 hour   Intake 1622.36 ml   Output 230 ml   Net 1392.36 ml     Last 3 weights  Wt Readings from Last 3 Encounters:   11/16/21 164 lb 3.9 oz (74.5 kg)   10/25/21 164 lb (74.4 kg)   09/29/21 160 lb (72.6 kg)           Physical Exam :  General Appearance:  Well developed. No distress  Mouth/Throat:  Oral mucosa moist.  Trach in place  Neck:  Supple, no JVD  Lungs:  Breath sounds: clear, diminished  Heart[de-identified]  S1,S2 heard  Abdomen:  Soft, non - tender  Musculoskeletal:  Edema - noted but appears better         Last 3 CBC   Recent Labs     11/18/21 0235 11/18/21 0235 11/19/21  0400 11/19/21  0400 11/19/21  0900 11/19/21  2022 11/20/21  0455   WBC 11.1*  --  10.0  --   --   --  8.7   RBC 3.06*  --  2.45*  --   --   --  3.00*   HGB 9.1*   < > 7.3*   < > 7.9* 8.7* 8.8*   HCT 29.2*   < > 24.1*   < > 25.5* 27.5* 28.6*   *  --  113*  --   --   --  113*    < > = values in this interval not displayed. Last 3 CMP  Recent Labs     11/18/21  0235 11/18/21  0235 11/19/21  0400 11/19/21  1440 11/20/21  0455   *  --  132*  --  131*   K 5.2   < > 5.6* 5.4* 5.3*     --  99  --  97*   CO2 22*  --  22*  --  20*   BUN 39*  --  45*  --  55*   CREATININE 2.0*  --  2.6*  --  3.1*   CALCIUM 7.9*  --  8.3*  --  8.2*   LABALBU 2.5*  --  2.5*  --  2.7*   BILITOT 0.5  --  0.5  --  0.8    < > = values in this interval not displayed. ASSESSMENT / Plan   1.  Renal -acute kidney injury, multifactorial etiology which includes hypotension from sepsis, IV contrast exposure on 10/29 and has some hydronephrosis as well . ? Patient's creatinine continued to get worse, but primarily fluid status worsened, she was started on CVVH. Has been off for a few days  ? Intradialytic creatinine is rising. Associated with some electrolyte abnormalities as well  ? Now that she is off pressors will try intermittent hemodialysis today     2. Electrolytes - mild hyponatremia adjust with dialysis  3. Hyperkalemia -will adjust with dialysis  4. Hypoglycemia -continues to be on D10  5. Mild acidosis should be corrected with dialysis  6. Acute hypercapnic respiratory failure currently ventilator dependent now with a trach  7. Pneumonia with pleural effusion and septic shock  8. Hypotension improved currently off pressors  9. Hydronephrosis status post nephrostomy tube placement  10. + ANCA, patient has been having some proteinuria as an outpatient and during my last visit have ordered all the serologic work-up , which the patient has done prior to getting admitted to the hospital however the ANCA was not done at that time. She may be having some renal pathology going on , but I doubt it has anything to do with acute renal dysfunction during this admission. However she has received steroids. Once clinically stable might consider a renal biopsy. 11. Meds reviewed and discussed with  nursing staff and family. Dr. Judie Martinez MD, M,D.  [facility].

## 2021-11-20 NOTE — PROGRESS NOTES
Pharmacy Vancomycin Consult     Vancomycin Day: 2  Current Dosing: Intermittent  Current dose: intermittent  Date Time Vancomycin dose Vancomycin Random   11/18/21 1200 HD     11/18/21 1809 1000mg     11/19/21  1736   14.1                                             Temp max:  98.8    Recent Labs     11/18/21  0235 11/19/21  0400   BUN 39* 45*   CREATININE 2.0* 2.6*   WBC 11.1* 10.0       Intake/Output Summary (Last 24 hours) at 11/19/2021 2030  Last data filed at 11/19/2021 1849  Gross per 24 hour   Intake 1822.63 ml   Output 460 ml   Net 1362.63 ml     Date Source Results   11/13/2021 Respiratory culture Normal magdiel   11/15/2021 Pneumonia PCR Negative   11/15/2021 Respiratory culture Normal magdiel   11/18/2021 Decubitus culture     11/19/21 Coccyx ngtd     Ht Readings from Last 1 Encounters:   10/28/21 4' 9\" (1.448 m)        Wt Readings from Last 1 Encounters:   11/16/21 164 lb 3.9 oz (74.5 kg)       Body mass index is 35.54 kg/m². CrCl cannot be calculated (Unknown ideal weight.). Trough: 14.1    Assessment/Plan:  Vancomycin 1000 mg IV once. Random vancomycin level will be drawn at 2200 11/20/21. Will continue to follow.     Ervin Burr RPh  11/19/2021  8:40 PM

## 2021-11-20 NOTE — FLOWSHEET NOTE
11/20/21 1625   Vital Signs   BP (!) 133/52   Temp 98 °F (36.7 °C)   Pulse 82   Resp 19   Weight 174 lb 6.1 oz (79.1 kg)   Weight Method Estimated   Percent Weight Change 6.17   Post-Hemodialysis Assessment   Post-Treatment Procedures Blood returned; Catheter Capped, clamped with Saline x2 ports   Machine Disinfection Process Acid/Vinegar Clean; Heat Disinfect; Exterior Machine Disinfection   Total Liters Processed (l/min) 46.8 l/min   Dialyzer Clearance Heavily streaked   Duration of Treatment (minutes) 210 minutes   Hemodialysis Intake (ml) 500 ml   Hemodialysis Output (ml) 2500 ml   NET Removed (ml) 2000 ml   Tolerated Treatment Good   3.5 hour treatment completed. 2L of fluid removed as ordered. Patient tolerated treatment fairly well. Cath lines flushed with 10 ml of 0.9 NS, clamped and tego secured. Report given to primary floor nurse.

## 2021-11-20 NOTE — PROGRESS NOTES
CRITICAL CARE PROGRESS NOTE      Patient:  Magalis Gutierrez    Unit/Bed:[id number]-09/009-A  YOB: 1950  MRN: [id number]   PCP: Jennie Joiner MD  Date of Admission: 10/27/2021  Chief Complaint:- Shortness of breath    INITIAL H AND P AND ICU COURSE:    71F admitted to [facility] 10/27/21 w/ SOB. Current smoker w/ PMH PAH grp 3, HFpEF, stage III sacral ulcer s/p wound ostomy 9/21. Patient sister reports SpO2 51% at home and was brought to ED where ABG showed pH 7.19, PCO2 80, PO2 134. She required emergent intubation and was transferred to ICU. Workup revealed left sided pleural effusion and underwent US guided thoracentesis w/ 1100 cc removed consistent w/ MRSA/adenovirus. Patient was noted to be in afib/RVR and was placed on amio, heparin drip. Patient was also noted to have normocytic anemia and received 1 unit PRBC 10/31/21. CT abdomen revealed CBD dilation w/ cholelithiasis. 11/1/2021: Plan for nephrostomy tubes today. Hemoglobin 7.7 s/p 1 unit PRBC yesterday. Weaned to 4 mics Levophed. 11/2/2021: Patient resumed back on prior dose of vancomycin. Low urine output w/ increasing pressor requirements today    11/3/2021: Will attempt SBT if able to wean today. Increased Amio drip 2/2 worsening tachycardia. Continued hematuria w/ low urine output. Cr stable. Will evaluate UTI following perc tube placement. Rise in WBC noted. Culture pending    11/4/2021: Cardioversion on 11/3/2021. Now and NSR. CVP 29 this a.m. Suspect drop in CI 2/2 to stopping levophed. Diffuse edema following transfusion overnight. Given one-time dose of 2 mg Bumex and albumin. SBT this AM    11/5/2021: Failed SBT yesterday. Continued low urine output. Trial of Bumex today. Will proceed with dialysis if fails to improve. 11/6/2021: Started on CRRT, continues to have bloody drainage of nephrostomy tube given 1 unit PRBC, cefipime emperically pending cultures of nephrostomy tube fluid and repeat resp cultures.      11/7/21: Patient remains clinically well on exam.  We'll continue medical of UF with CRRT. On empiric cefepime for coverage of perinephric abscess noted on CT abdomen and pelvis November 4, 2021. White count trending down. Will attempt spontaneous breathing trial in a.m.    11/8/21: Transfused 1x PRBC this AM. Anemia workup pending. WBC trending down. Patient ventilator settings this AM preclude SBT. Volume overloaded on exam. UF increased to 125 cc/hr this AM. Will attempt to wean vent settings further further. Coag neg staph growing on nephrostomy tube aspirate. Suspect contamination. Continue existing ABx    11/9/21: Pulse dose steroids initiated overnight. Started on SSI. Urine output now improving 75cc/hr. Transfused 3 units PRBCs yesterday for Improve DO2. MERCEDES noted w/ 875 mg deficit. Will replace once steroids/ABx completed. 11/10/21 Extubated overnight. SLP eval today. Continue BiPAP while asleep w/ transition to NC while awake as tolerated. No tachypnea. Day 3 pulse dose steroids. Continued bloody drainage from nephrostomy tube. Holding [facility]     11/11/21 Worsening bilateral infiltrates with SpO2 <90% on HFNC. Continued pulmonary hygiene w/ nebulized hypertonic saline, acapella and breathing treatments in addition to deep suctioning. Continued on Vancomycin for MRSA PNA. Plan for family meeting this AM to discuss goals of care. 11/12/21 Follow-up discussion from family meeting. Patient nodded agreement with full code status including reintubation leading to tracheostomy and PEG placement and continued hemodialysis if needed. Patient nodded agreement and understanding with these decisions. CXR yesterday evening did show left mainstem mucus plugging with cutoff sign and absent breath sounds and did undergo emergent bronchoscopy overnight on 11/12/21. Diminished breath sounds and worsening respiratory status this AM concerning for continued plugging. Stat chest X-ray ordered for confirmation.      11/13/21: Respiratory status continues to decline, she is requiring BiPAP with FiO2 of 70%. Chest x-ray shows complete opacification of the left lung secondary to mucous plugging, also the patient has left-sided pleural effusion. Plan for bronchoscopy. 11/15/2021: Family meeting today per event note. Reintubated today after failed BiPAP    11/16/2021: will dc abx tomorrow. PNA panel negative. Plan for tracheostomy time permitting today. Weaning phenylephrine as tolerated. 11/17/2021: Necrotic Stage IV pressure ulcer probing to bone noted this AM. CT abdomen and pelvis pending to evaluate for abscess. Will discuss with family today when available. 11/18/2021: CT showed bony involvement of sacral ulcer w/ concern for osteomyelitis. Wound ostomy following. Culture pending. Started on broad spectrum therapy with Vancomycin and Zosyn for coverage of suspect osteomyelitis. Weaning sedation as tolerated    11/19/21: Wound culture pending. On broad spectrum ABx for coverage of osteomyelitis. Will de-escalate pending culture/sensitiivity. Consult to GI for PEG tube evaluation.      11/20/21: remained on MV through Tracheostomy tube, Urine output around 60 ml, Nephrostomy tube 20 ml     Scheduled Meds:   metoclopramide  5 mg IntraVENous Q8H    sodium chloride (Inhalant)  4 mL Nebulization Q6H    And    albuterol  2.5 mg Nebulization Q6H    lidocaine PF  4 mL Inhalation 4x daily    midodrine  15 mg Oral Q8H    lactobacillus  1 capsule Per NG tube Daily with breakfast    piperacillin-tazobactam  3,375 mg IntraVENous Q12H    vancomycin (VANCOCIN) intermittent dosing (placeholder)   Other RX Placeholder    chlorhexidine  15 mL Mouth/Throat BID    ketamine  100 mg IntraVENous Once    lidocaine 1 % injection  5 mL IntraDERmal Once    insulin lispro  0-6 Units SubCUTAneous Q6H    allopurinol  100 mg Oral Daily    phosphorus replacement protocol   Other RX Placeholder    potassium bicarb-citric acid  40 mEq Oral Once   [facility] tiotropium-olodaterol  2 puff Inhalation Daily    [Held by provider] aspirin  81 mg Oral Daily    pantoprazole  40 mg IntraVENous QAM    sodium hypochlorite   Irrigation Daily    Riociguat  2.5 mg Oral Q8H    sodium chloride flush  5-40 mL IntraVENous 2 times per day    FLUoxetine  40 mg Oral Daily    [Held by provider] metoprolol tartrate  12.5 mg Oral BID     Continuous Infusions:   dextrose 30 mL/hr at 11/20/21 0557    sodium chloride      midazolam      fentaNYL 500 mcg in sodium chloride 0.9% 100 ml infusion Stopped (11/18/21 0100)    phenylephrine (KRISTAL-SYNEPHRINE) 50mg/250mL infusion Stopped (11/19/21 1438)    dextrose      dexmedetomidine 0.1 mcg/kg/hr (11/20/21 1136)    sodium chloride Stopped (11/17/21 0709)    [Held by provider] sodium chloride 50 mL/hr at 10/28/21 2314       PHYSICAL EXAMINATION:  Vital signs: Reviewed   General: Acute on chronically ill-appearing elderly white female  HEENT: Temporal wasting appreciated. Normocephalic and atraumatic. No scleral icterus. PERR  Neck: supple. No Thyromegaly. Left subclavian dialysis catheter   Lungs: Diminished breath sounds bilateral  Cardiac: RRR. No JVD. Abdomen: soft. Nontender. ,npehrostomy tube with bloody drainage  Extremities:  +2 pitting edema x4. (interval improvement noted) No clubbing, cyanosis   Vasculature: capillary refill < 3 seconds. Palpable dorsalis pedis pulses. Skin:  warm and dry. Psych: Alert and oriented to person place and time, affect appropriate  Lymph:  No supraclavicular adenopathy. Neurologic:  No focal deficit. No seizures. Data: (All radiographs, tracings, PFTs, and imaging are personally viewed and interpreted unless otherwise noted). Ref.  Range 11/20/2021 04:55   Sodium Latest Ref Range: 135 - 145 meq/L 131 (L)   Potassium Latest Ref Range: 3.5 - 5.2 meq/L 5.3 (H)   Chloride Latest Ref Range: 98 - 111 meq/L 97 (L)   CO2 Latest Ref Range: 23 - 33 meq/L 20 (L)   BUN Latest Ref Range: 7 - 22 2/2 severe PNA now concern for necrotizing wound on sacrum. Initial CI 6.3 w/ NE precludes concern for septic shock NE d/c 2/2 worsening afib RVR. Currently on  Phenylephrine for pressor support. Weaning as tolerated. 3. 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 Probes to bone, necrosis noted around wound site. CT abdomen and pelvis showed possible im involvement with concern for osteomyelitis. Seen by general surgery on 11/17/21. No indications for debridement at that time. Patient is a poor candidate for surgery given multiple co-morbidities, malnutrition and poor wound healing. Started on Vancomycin 11/18/21 and Zosysn. Cultures pending. Poor prognosis. Continue Dakins w/ dressing changes. Will de-escalate following culture sensitivity. 4. Stage II ALVIN on CKD 3: Suspect post renal etiology 2/2 staghorn calculi. Nephrology following. CRRT initiated 11/5/21 will continue for UF removal that was continued until 11/12, the patient is off CRRT. 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 ANCA associated Abs show elevated anti MPO elevated 416 and serine proteinase 3 IgG WNL. Will need Bx for confirmation. Mitochondrial Abs pending, Anti-DS-DNA negative, Anti histone negative, Anti Smith,and  Anti-Savana WNL, GBM antibodies negative, C3/C4 levels normal, elevated kappa/lambda free light chains with normal ratio. Plans for Bx per nephrology when stable. Fluid collection noted on left renal unlikely abscess as patient remains afebrile. CVVH stopped 11/15/21 significant oliguria since that time. 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 Will need Renal Bx for confirmation once stable. Discussed case w/ Nephrology. Completed pulse dose steroids starting on 11/8/21 w/ 10mg/kg methylprednisolone for 3 days. SSI started for coverage.  Not candidate for Rituxin/TPE 2/2 existing infection. No diffuse alveolar hemorrhage. 6. Hydronephrosis 2/2 Left-sided staghorn calculi: Urology following. Percutaneous drainage tube 11/2/21 IR. Low output following tube placement. Increased flow on CBI per urology recs. Still flushing clots. Perinephric stranding noted on CT abdomen and pelvis w/ air fluid region discontiguous with region of nephrostomy tube insertion raised initial concern for abscess (<3 cm) vs emphysematous pyelonephritis when discussed with radiologist. Received 5 days empiric therapy w/ cefepime (started 11/5/21). On Vancomycin per above. Culture of nephrostomy tube aspirate from 11/5/21 shows staph epidermidis (vancomycin sensitive) . 7. Acute blood loss anemia: Suspect consumptive process. Leading differential , smear, reticulocytes, LDH, Haptoglobin, MERCEDES, B12/folate WNL. suspect 2/2 hemolysis on CRRT. PRBC x1 transfused 10/31/21, 11/4/21, 11/5/21, x2 11/6/21, x3 11/8/21, x3 11/15/21. Heparin stopped (dialysis dose still running). Holding ASA. 8. Thrombocytopenia: suspect consumptive process in context of sepsis. Workup per above. Not on heparin per above. 9. Hematuria: s/p perc tube placement. CBI per above. Stopped heparin, holding ASA. 10. Dysphagia: 2/2 prolonged intubation. NPO per SLP. Not tolerating tube feeds overnight. Currently NPO. Will attempt Dobhoff tube placement today. Consult GI for PEG placement. 11. Hypoglycemia: 2/2 to poor po intake unable to tolerate tube feeds per above. 12. Bilateral Pleural Effusion:   Associated volume overload in context of renal failure. Interval enlargement noted on CT 11/1/21. Currently on CVVH for effusions. Plan for chest tube insertion if no improvement  13. Suspected COPD: current every day smoker. Obstructive process noted on flow volume loop on ventilator. Started empiric therapy with Stiolto. Recommend formal PFT once improved  14. PAH/chronic RV failure NYHA II: EF 55 to 60% G2 DD TTE 5/4/2021.  RHC showed [place] 30 with PM

## 2021-11-20 NOTE — PROGRESS NOTES
Renal Adjustment Follow-up    Recent Labs     11/19/21  0400 11/20/21  0455   BUN 45* 55*   CREATININE 2.6* 3.1*     CrCl cannot be calculated (Unknown ideal weight.). EGFR 15    Plan: Patient to have IHD today. Continue zosyn 3.375gm q12h  Decrease metoclopramide dose to 5mg q8h      NATHALY Brice [facility] PAEZ, BCPS, BCCCP 11/20/2021 11:49 AM

## 2021-11-21 LAB
ALBUMIN SERPL-MCNC: 2.4 G/DL (ref 3.5–5.1)
ALP BLD-CCNC: 107 U/L (ref 38–126)
ALT SERPL-CCNC: 14 U/L (ref 11–66)
ANION GAP SERPL CALCULATED.3IONS-SCNC: 10 MEQ/L (ref 8–16)
AST SERPL-CCNC: 13 U/L (ref 5–40)
BASOPHILS # BLD: 0.8 %
BASOPHILS ABSOLUTE: 0.1 THOU/MM3 (ref 0–0.1)
BILIRUB SERPL-MCNC: 0.4 MG/DL (ref 0.3–1.2)
BUN BLDV-MCNC: 31 MG/DL (ref 7–22)
CALCIUM IONIZED: 1.19 MMOL/L (ref 1.12–1.32)
CALCIUM SERPL-MCNC: 8.2 MG/DL (ref 8.5–10.5)
CHLORIDE BLD-SCNC: 96 MEQ/L (ref 98–111)
CO2: 25 MEQ/L (ref 23–33)
CREAT SERPL-MCNC: 2.5 MG/DL (ref 0.4–1.2)
EOSINOPHIL # BLD: 1.2 %
EOSINOPHILS ABSOLUTE: 0.1 THOU/MM3 (ref 0–0.4)
ERYTHROCYTE [DISTWIDTH] IN BLOOD BY AUTOMATED COUNT: 17 % (ref 11.5–14.5)
ERYTHROCYTE [DISTWIDTH] IN BLOOD BY AUTOMATED COUNT: 60 FL (ref 35–45)
GFR SERPL CREATININE-BSD FRML MDRD: 19 ML/MIN/1.73M2
GLUCOSE BLD-MCNC: 113 MG/DL (ref 70–108)
GLUCOSE BLD-MCNC: 113 MG/DL (ref 70–108)
GLUCOSE BLD-MCNC: 114 MG/DL (ref 70–108)
GLUCOSE BLD-MCNC: 114 MG/DL (ref 70–108)
GLUCOSE BLD-MCNC: 132 MG/DL (ref 70–108)
HCT VFR BLD CALC: 26.9 % (ref 37–47)
HEMOGLOBIN: 8.2 GM/DL (ref 12–16)
IMMATURE GRANS (ABS): 0.05 THOU/MM3 (ref 0–0.07)
IMMATURE GRANULOCYTES: 0.7 %
LYMPHOCYTES # BLD: 4.3 %
LYMPHOCYTES ABSOLUTE: 0.3 THOU/MM3 (ref 1–4.8)
MAGNESIUM: 1.9 MG/DL (ref 1.6–2.4)
MCH RBC QN AUTO: 29.5 PG (ref 26–33)
MCHC RBC AUTO-ENTMCNC: 30.5 GM/DL (ref 32.2–35.5)
MCV RBC AUTO: 96.8 FL (ref 81–99)
MONOCYTES # BLD: 10.1 %
MONOCYTES ABSOLUTE: 0.7 THOU/MM3 (ref 0.4–1.3)
NUCLEATED RED BLOOD CELLS: 0 /100 WBC
PHOSPHORUS: 4.1 MG/DL (ref 2.4–4.7)
PLATELET # BLD: 116 THOU/MM3 (ref 130–400)
PMV BLD AUTO: 9.6 FL (ref 9.4–12.4)
POTASSIUM SERPL-SCNC: 3.5 MEQ/L (ref 3.5–5.2)
RBC # BLD: 2.78 MILL/MM3 (ref 4.2–5.4)
SEG NEUTROPHILS: 82.9 %
SEGMENTED NEUTROPHILS ABSOLUTE COUNT: 6 THOU/MM3 (ref 1.8–7.7)
SODIUM BLD-SCNC: 131 MEQ/L (ref 135–145)
TOTAL PROTEIN: 5.1 G/DL (ref 6.1–8)
WBC # BLD: 7.2 THOU/MM3 (ref 4.8–10.8)

## 2021-11-21 PROCEDURE — 99233 SBSQ HOSP IP/OBS HIGH 50: CPT | Performed by: INTERNAL MEDICINE

## 2021-11-21 PROCEDURE — C9113 INJ PANTOPRAZOLE SODIUM, VIA: HCPCS | Performed by: NURSE PRACTITIONER

## 2021-11-21 PROCEDURE — 6370000000 HC RX 637 (ALT 250 FOR IP): Performed by: INTERNAL MEDICINE

## 2021-11-21 PROCEDURE — 6370000000 HC RX 637 (ALT 250 FOR IP): Performed by: FAMILY MEDICINE

## 2021-11-21 PROCEDURE — 6360000002 HC RX W HCPCS: Performed by: STUDENT IN AN ORGANIZED HEALTH CARE EDUCATION/TRAINING PROGRAM

## 2021-11-21 PROCEDURE — 6370000000 HC RX 637 (ALT 250 FOR IP): Performed by: STUDENT IN AN ORGANIZED HEALTH CARE EDUCATION/TRAINING PROGRAM

## 2021-11-21 PROCEDURE — 36592 COLLECT BLOOD FROM PICC: CPT

## 2021-11-21 PROCEDURE — 85025 COMPLETE CBC W/AUTO DIFF WBC: CPT

## 2021-11-21 PROCEDURE — 2580000003 HC RX 258: Performed by: STUDENT IN AN ORGANIZED HEALTH CARE EDUCATION/TRAINING PROGRAM

## 2021-11-21 PROCEDURE — 2580000003 HC RX 258: Performed by: EMERGENCY MEDICINE

## 2021-11-21 PROCEDURE — 80053 COMPREHEN METABOLIC PANEL: CPT

## 2021-11-21 PROCEDURE — 6360000002 HC RX W HCPCS

## 2021-11-21 PROCEDURE — 82948 REAGENT STRIP/BLOOD GLUCOSE: CPT

## 2021-11-21 PROCEDURE — 2580000003 HC RX 258: Performed by: INTERNAL MEDICINE

## 2021-11-21 PROCEDURE — 6360000002 HC RX W HCPCS: Performed by: INTERNAL MEDICINE

## 2021-11-21 PROCEDURE — 6360000002 HC RX W HCPCS: Performed by: PHARMACIST

## 2021-11-21 PROCEDURE — 2500000003 HC RX 250 WO HCPCS: Performed by: NURSE PRACTITIONER

## 2021-11-21 PROCEDURE — 94003 VENT MGMT INPAT SUBQ DAY: CPT

## 2021-11-21 PROCEDURE — 94640 AIRWAY INHALATION TREATMENT: CPT

## 2021-11-21 PROCEDURE — 84100 ASSAY OF PHOSPHORUS: CPT

## 2021-11-21 PROCEDURE — 6370000000 HC RX 637 (ALT 250 FOR IP): Performed by: NURSE PRACTITIONER

## 2021-11-21 PROCEDURE — 82330 ASSAY OF CALCIUM: CPT

## 2021-11-21 PROCEDURE — 36415 COLL VENOUS BLD VENIPUNCTURE: CPT

## 2021-11-21 PROCEDURE — 83735 ASSAY OF MAGNESIUM: CPT

## 2021-11-21 PROCEDURE — 6360000002 HC RX W HCPCS: Performed by: FAMILY MEDICINE

## 2021-11-21 PROCEDURE — 2060000000 HC ICU INTERMEDIATE R&B

## 2021-11-21 PROCEDURE — 6360000002 HC RX W HCPCS: Performed by: NURSE PRACTITIONER

## 2021-11-21 RX ORDER — LIDOCAINE HYDROCHLORIDE 40 MG/ML
4 INJECTION, SOLUTION RETROBULBAR; TOPICAL 4 TIMES DAILY PRN
Status: DISCONTINUED | OUTPATIENT
Start: 2021-11-21 | End: 2021-12-01 | Stop reason: HOSPADM

## 2021-11-21 RX ORDER — LORAZEPAM 2 MG/ML
INJECTION INTRAMUSCULAR
Status: COMPLETED
Start: 2021-11-21 | End: 2021-11-21

## 2021-11-21 RX ORDER — LORAZEPAM 2 MG/ML
1 INJECTION INTRAMUSCULAR EVERY 6 HOURS PRN
Status: DISCONTINUED | OUTPATIENT
Start: 2021-11-21 | End: 2021-12-01 | Stop reason: HOSPADM

## 2021-11-21 RX ADMIN — ALBUTEROL SULFATE 2.5 MG: 2.5 SOLUTION RESPIRATORY (INHALATION) at 00:20

## 2021-11-21 RX ADMIN — ALLOPURINOL 100 MG: 100 TABLET ORAL at 08:29

## 2021-11-21 RX ADMIN — VANCOMYCIN HYDROCHLORIDE 1000 MG: 1 INJECTION, POWDER, LYOPHILIZED, FOR SOLUTION INTRAVENOUS at 04:49

## 2021-11-21 RX ADMIN — LORAZEPAM 1 MG: 2 INJECTION INTRAMUSCULAR; INTRAVENOUS at 01:06

## 2021-11-21 RX ADMIN — CHLORHEXIDINE GLUCONATE 15 ML: 1.2 RINSE ORAL at 21:32

## 2021-11-21 RX ADMIN — TIOTROPIUM BROMIDE AND OLODATEROL 2 PUFF: 3.124; 2.736 SPRAY, METERED RESPIRATORY (INHALATION) at 08:34

## 2021-11-21 RX ADMIN — Medication 1 CAPSULE: at 08:29

## 2021-11-21 RX ADMIN — SODIUM CHLORIDE, PRESERVATIVE FREE 10 ML: 5 INJECTION INTRAVENOUS at 08:34

## 2021-11-21 RX ADMIN — PIPERACILLIN AND TAZOBACTAM 3375 MG: 3; .375 INJECTION, POWDER, LYOPHILIZED, FOR SOLUTION INTRAVENOUS at 22:39

## 2021-11-21 RX ADMIN — CHLORHEXIDINE GLUCONATE 15 ML: 1.2 RINSE ORAL at 08:29

## 2021-11-21 RX ADMIN — DEXTROSE MONOHYDRATE: 100 INJECTION, SOLUTION INTRAVENOUS at 08:49

## 2021-11-21 RX ADMIN — PIPERACILLIN AND TAZOBACTAM 3375 MG: 3; .375 INJECTION, POWDER, LYOPHILIZED, FOR SOLUTION INTRAVENOUS at 10:18

## 2021-11-21 RX ADMIN — METOCLOPRAMIDE HYDROCHLORIDE 5 MG: 5 INJECTION INTRAMUSCULAR; INTRAVENOUS at 08:39

## 2021-11-21 RX ADMIN — METOCLOPRAMIDE HYDROCHLORIDE 5 MG: 5 INJECTION INTRAMUSCULAR; INTRAVENOUS at 01:13

## 2021-11-21 RX ADMIN — MIDODRINE HYDROCHLORIDE 15 MG: 10 TABLET ORAL at 13:25

## 2021-11-21 RX ADMIN — ALBUTEROL SULFATE 2.5 MG: 2.5 SOLUTION RESPIRATORY (INHALATION) at 06:09

## 2021-11-21 RX ADMIN — LIDOCAINE HYDROCHLORIDE 4 ML: 40 INJECTION, SOLUTION RETROBULBAR; TOPICAL at 09:28

## 2021-11-21 RX ADMIN — MIDODRINE HYDROCHLORIDE 15 MG: 10 TABLET ORAL at 21:32

## 2021-11-21 RX ADMIN — PANTOPRAZOLE SODIUM 40 MG: 40 INJECTION, POWDER, FOR SOLUTION INTRAVENOUS at 08:35

## 2021-11-21 RX ADMIN — SODIUM CHLORIDE SOLN NEBU 3% 4 ML: 3 NEBU SOLN at 17:10

## 2021-11-21 RX ADMIN — ALBUTEROL SULFATE 2.5 MG: 2.5 SOLUTION RESPIRATORY (INHALATION) at 11:58

## 2021-11-21 RX ADMIN — FLUOXETINE 40 MG: 20 CAPSULE ORAL at 08:29

## 2021-11-21 RX ADMIN — METOCLOPRAMIDE HYDROCHLORIDE 5 MG: 5 INJECTION INTRAMUSCULAR; INTRAVENOUS at 17:52

## 2021-11-21 RX ADMIN — DEXTROSE MONOHYDRATE: 100 INJECTION, SOLUTION INTRAVENOUS at 17:36

## 2021-11-21 RX ADMIN — ALBUTEROL SULFATE 2.5 MG: 2.5 SOLUTION RESPIRATORY (INHALATION) at 17:10

## 2021-11-21 RX ADMIN — SODIUM CHLORIDE SOLN NEBU 3% 4 ML: 3 NEBU SOLN at 11:58

## 2021-11-21 RX ADMIN — FENTANYL CITRATE 25 MCG: 0.05 INJECTION, SOLUTION INTRAMUSCULAR; INTRAVENOUS at 05:25

## 2021-11-21 RX ADMIN — SODIUM HYPOCHLORITE: 1.25 SOLUTION TOPICAL at 14:40

## 2021-11-21 RX ADMIN — SODIUM CHLORIDE, PRESERVATIVE FREE 10 ML: 5 INJECTION INTRAVENOUS at 21:32

## 2021-11-21 RX ADMIN — SODIUM CHLORIDE SOLN NEBU 3% 4 ML: 3 NEBU SOLN at 06:09

## 2021-11-21 RX ADMIN — LORAZEPAM 1 MG: 2 INJECTION INTRAMUSCULAR; INTRAVENOUS at 23:36

## 2021-11-21 RX ADMIN — SODIUM CHLORIDE SOLN NEBU 3% 4 ML: 3 NEBU SOLN at 00:20

## 2021-11-21 RX ADMIN — MIDODRINE HYDROCHLORIDE 15 MG: 10 TABLET ORAL at 04:52

## 2021-11-21 ASSESSMENT — PAIN SCALES - GENERAL
PAINLEVEL_OUTOF10: 0

## 2021-11-21 ASSESSMENT — PULMONARY FUNCTION TESTS
PIF_VALUE: 16
PIF_VALUE: 15
PIF_VALUE: 16

## 2021-11-21 NOTE — PROGRESS NOTES
Pharmacy Vancomycin Consult     Vancomycin Day: 4  Current Dosing: intermittent      Temp max:  99.7    Recent Labs     11/19/21  0400 11/20/21  0455   BUN 45* 55*   CREATININE 2.6* 3.1*   WBC 10.0 8.7       Intake/Output Summary (Last 24 hours) at 11/21/2021 0409  Last data filed at 11/21/2021 0304  Gross per 24 hour   Intake 2596.31 ml   Output 2905 ml   Net -308.69 ml     Culture Date      Source                       Results  No new culture results. Coccyx wound growing gram negative bacilli. Ht Readings from Last 1 Encounters:   10/28/21 4' 9\" (1.448 m)        Wt Readings from Last 1 Encounters:   11/20/21 174 lb 6.1 oz (79.1 kg)       Body mass index is 37.74 kg/m². CrCl cannot be calculated (Unknown ideal weight.). HD on 11/20    Trough: 18.7    Assessment/Plan:  Had HD yesterday. Additional dose of vancomycin 1000 mg ordered for this am.  Did not order next trough yet pending am labs and waiting to see the plan from a renal standpoint.     Debora Gómez, RPHPharmD  11/21/2021   4:20 AM

## 2021-11-21 NOTE — PROGRESS NOTES
Patient transferred from ICU to [facility]. Vital signs obtained. Cardiac monitor applied. Patient on a ventilator with FIO2 of 30%. Continuous pulse ox applied. Two RN skin assessment: Bree RN and Holley RN.

## 2021-11-21 NOTE — PROGRESS NOTES
Pt transferred to k 12 per bed in stable condition .  Assessment unchanged - pt sister [employer] aware of transfer and room

## 2021-11-21 NOTE — PROGRESS NOTES
CRITICAL CARE PROGRESS NOTE      Patient:  Tamera Louise    Unit/Bed:4D-09/009-A  YOB: 1950  MRN: [id number]   PCP: Dhaval Ramos MD  Date of Admission: 10/27/2021  Chief Complaint:- Shortness of breath    Assessment and Plan:    1. Acute combined hypercapnic and hypoxic respiratory failure: Secondary to severe pneumonia with left-sided pleural effusion and repeated mucous plugging. Intubated 10/27/21. Extubated following successful SBT on 11/9/21. Chest x-ray today shows combination of left lung atelectasis most likely secondary to mucous plugging in addition to pleural effusion, she is a status post bronchoscopy with mucous plug removal from the left mainstem on 11/13. Failed HFNC and was reintubated on 11/15/21. Family consented for tracheostomy at that time. Tracheostomy placed 11/17/21.   2. Septic Shock: 2/2 severe PNA now concern for necrotizing wound on sacrum. Initial CI 6.3 w/ NE precludes concern for septic shock NE d/c 2/2 worsening afib RVR. Currently on  Phenylephrine for pressor support. Weaning as tolerated. 3. 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 Probes to bone, necrosis noted around wound site. CT abdomen and pelvis showed possible mi involvement with concern for osteomyelitis. Seen by general surgery on 11/17/21. No indications for debridement at that time. Patient is a poor candidate for surgery given multiple co-morbidities, malnutrition and poor wound healing. Started on Vancomycin 11/18/21 and Zosysn. Cultures pending. Continue Dakins w/ dressing changes. Culture growing gram negative bacilli. Vanc stopped 11/21/21 Will further de-escalate following culture sensitivity. 4. Stage II ALVIN on CKD 3: Suspect post renal etiology 2/2 staghorn calculi. Nephrology following.  CRRT initiated 11/5/21 will continue for UF removal that was consumptive process in context of sepsis. Workup per above. Not on heparin per above. 9. Hematuria: s/p perc tube placement. CBI per above. Stopped heparin, holding ASA. 10. Dysphagia: 2/2 prolonged intubation. NPO per SLP. Not tolerating tube feeds overnight. Currently NPO. Will attempt Dobhoff tube placement today. Consult GI for PEG placement. 11. Hypoglycemia: 2/2 to poor po intake unable to tolerate tube feeds per above. 12. Bilateral Pleural Effusion:   Associated volume overload in context of renal failure. Interval enlargement noted on CT 11/1/21. Currently on CVVH for effusions. Plan for chest tube insertion if no improvement  13. Suspected COPD: current every day smoker. Obstructive process noted on flow volume loop on ventilator. Started empiric therapy with Stiolto. Recommend formal PFT once improved  14. PAH/chronic RV failure NYHA II: EF 55 to 60% G2 DD TTE 5/4/2021. RHC showed [place] 30 with elevated PCWP. Treated w/ Riociguat. 15. Normocytic Anemia: 2/2 hematuria s/p perc tube placement w/ Iron studies consistent w/  MERCEDES vs Anemia chronic disease. B12 WNL, Folate low, Abs reticulocyte index 11/18/21 0.49% Soluable transferrin 139. Calculated Iron deficit 827mg. Will start Venofer replace folate once ABx stopped  16. Cholelithiasis: w/ dilated CBD. Noted on 10/30/2021 US liver/GB. GI following. Does not suspect choledocolithiasis at this time. May consider HIDA scan once stable   17. Leukocytosis: 2/2 to pulse dose steroids per above  18. Severe malnutrition: Pre-albumin 14.4 w/ notable cachexia. 19. Chronic tobacco use:   cessation  20. Mild AS: Noted on previous TTE not appreciated on repeat imaging  21. Gout:  W/o exacerbation. Continue allopurinol  22. BARBER: non compliant w/ CPAP  23. New onset atrial fibrillation with rapid ventricular response (resolved): now in NSR s/p DCCV 11/3/21 Stopped Heparin per hematuria w/ anemia requiring transfusion & and now in NSR.  Stopped Amiodarone 11/8/21. Holding metoprolol 2/2 hypotension  24. Hyperkalemia (resolved): 2/2 ALVIN. Now worsening following cessation of CVVH  25. Hyponatremia (resolved): Suspect 2/2 renal failure. 26. Severe bilateral multiorganism pneumonia (resolved): PCR positive Staph w/ MECA gene and adenovirus consistent w/ MRSA colonization. Culture growing MSSA. Repeat culture 11/5/21 shows coag positive staph completed 14 days Vancomycin (started 10/28/21) and completed 5 days Rocephin Clindamycin started for coverage of PVL as patient clinically worsening following 14 days Vancomycin for MRSA coverage. Underwent bronchoscopy for left mainstem mucus plug noted CXR on 11/11/21. Switched to Clindamycin for coverage of PVL received 8 days therapy (started 11/11/21). PNA panel from 11/15/21 negative. Repeat cultures show no growth  27. Nutrition: on TF @30 ml       INITIAL H AND P AND ICU COURSE:  71F admitted to [facility] 10/27/21 w/ SOB. Current smoker w/ PMH PAH grp 3, HFpEF, stage III sacral ulcer s/p wound ostomy 9/21. Patient sister reports SpO2 51% at home and was brought to ED where ABG showed pH 7.19, PCO2 80, PO2 134. She required emergent intubation and was transferred to ICU. Workup revealed left sided pleural effusion and underwent US guided thoracentesis w/ 1100 cc removed consistent w/ MRSA/adenovirus. Patient was noted to be in afib/RVR and was placed on amio, heparin drip. Patient was also noted to have normocytic anemia and received 1 unit PRBC 10/31/21. CT abdomen revealed CBD dilation w/ cholelithiasis. 11/1/2021: Plan for nephrostomy tubes today. Hemoglobin 7.7 s/p 1 unit PRBC yesterday. Weaned to 4 mics Levophed. 11/2/2021: Patient resumed back on prior dose of vancomycin. Low urine output w/ increasing pressor requirements today    11/3/2021: Will attempt SBT if able to wean today. Increased Amio drip 2/2 worsening tachycardia. Continued hematuria w/ low urine output. Cr stable.  Will evaluate UTI following perc tube placement. Rise in WBC noted. Culture pending    11/4/2021: Cardioversion on 11/3/2021. Now and NSR. CVP 29 this a.m. Suspect drop in CI 2/2 to stopping levophed. Diffuse edema following transfusion overnight. Given one-time dose of 2 mg Bumex and albumin. SBT this AM    11/5/2021: Failed SBT yesterday. Continued low urine output. Trial of Bumex today. Will proceed with dialysis if fails to improve. 11/6/2021: Started on CRRT, continues to have bloody drainage of nephrostomy tube given 1 unit PRBC, cefipime emperically pending cultures of nephrostomy tube fluid and repeat resp cultures. 11/7/21: Patient remains clinically well on exam.  We'll continue medical of UF with CRRT. On empiric cefepime for coverage of perinephric abscess noted on CT abdomen and pelvis November 4, 2021. White count trending down. Will attempt spontaneous breathing trial in a.m.    11/8/21: Transfused 1x PRBC this AM. Anemia workup pending. WBC trending down. Patient ventilator settings this AM preclude SBT. Volume overloaded on exam. UF increased to 125 cc/hr this AM. Will attempt to wean vent settings further further. Coag neg staph growing on nephrostomy tube aspirate. Suspect contamination. Continue existing ABx    11/9/21: Pulse dose steroids initiated overnight. Started on SSI. Urine output now improving 75cc/hr. Transfused 3 units PRBCs yesterday for Improve DO2. MERCEDES noted w/ 875 mg deficit. Will replace once steroids/ABx completed. 11/10/21 Extubated overnight. SLP eval today. Continue BiPAP while asleep w/ transition to NC while awake as tolerated. No tachypnea. Day 3 pulse dose steroids. Continued bloody drainage from nephrostomy tube. Holding [facility]     11/11/21 Worsening bilateral infiltrates with SpO2 <90% on HFNC. Continued pulmonary hygiene w/ nebulized hypertonic saline, acapella and breathing treatments in addition to deep suctioning. Continued on Vancomycin for MRSA PNA.  Plan for family meeting this AM to discuss goals of care. 11/12/21 Follow-up discussion from family meeting. Patient nodded agreement with full code status including reintubation leading to tracheostomy and PEG placement and continued hemodialysis if needed. Patient nodded agreement and understanding with these decisions. CXR yesterday evening did show left mainstem mucus plugging with cutoff sign and absent breath sounds and did undergo emergent bronchoscopy overnight on 11/12/21. Diminished breath sounds and worsening respiratory status this AM concerning for continued plugging. Stat chest X-ray ordered for confirmation. 11/13/21: Respiratory status continues to decline, she is requiring BiPAP with FiO2 of 70%. Chest x-ray shows complete opacification of the left lung secondary to mucous plugging, also the patient has left-sided pleural effusion. Plan for bronchoscopy. 11/15/2021: Family meeting today per event note. Reintubated today after failed BiPAP    11/16/2021: will dc abx tomorrow. PNA panel negative. Plan for tracheostomy time permitting today. Weaning phenylephrine as tolerated. 11/17/2021: Necrotic Stage IV pressure ulcer probing to bone noted this AM. CT abdomen and pelvis pending to evaluate for abscess. Will discuss with family today when available. 11/18/2021: CT showed bony involvement of sacral ulcer w/ concern for osteomyelitis. Wound ostomy following. Culture pending. Started on broad spectrum therapy with Vancomycin and Zosyn for coverage of suspect osteomyelitis. Weaning sedation as tolerated    11/19/21: Wound culture pending. On broad spectrum ABx for coverage of osteomyelitis. Will de-escalate pending culture/sensitiivity. Consult to GI for PEG tube evaluation. 11/21/21: Tolerated conventional HD on 11/20/21 w/ 2500cc removed. Wound culture growing gram negative bacilli. Vanc discontinued. Weaned off phenylephrine. Will monitor overnight.  Okay to transfer to step down in AM    Past Medical History: Per HPI. Family History:  DM in father and sister. Social History: chronic smoker. ROS   Patient reports fatigue, coughing. A 12 point review of systems was otherwise negative    Scheduled Meds:   metoclopramide  5 mg IntraVENous Q8H    sodium chloride (Inhalant)  4 mL Nebulization Q6H    And    albuterol  2.5 mg Nebulization Q6H    midodrine  15 mg Oral Q8H    lactobacillus  1 capsule Per NG tube Daily with breakfast    piperacillin-tazobactam  3,375 mg IntraVENous Q12H    vancomycin (VANCOCIN) intermittent dosing (placeholder)   Other RX Placeholder    chlorhexidine  15 mL Mouth/Throat BID    ketamine  100 mg IntraVENous Once    lidocaine 1 % injection  5 mL IntraDERmal Once    insulin lispro  0-6 Units SubCUTAneous Q6H    allopurinol  100 mg Oral Daily    phosphorus replacement protocol   Other RX Placeholder    potassium bicarb-citric acid  40 mEq Oral Once    tiotropium-olodaterol  2 puff Inhalation Daily    [Held by provider] aspirin  81 mg Oral Daily    pantoprazole  40 mg IntraVENous QAM    sodium hypochlorite   Irrigation Daily    Riociguat  2.5 mg Oral Q8H    sodium chloride flush  5-40 mL IntraVENous 2 times per day    FLUoxetine  40 mg Oral Daily    [Held by provider] metoprolol tartrate  12.5 mg Oral BID     Continuous Infusions:   dextrose 30 mL/hr at 11/21/21 0849    sodium chloride      midazolam      fentaNYL 500 mcg in sodium chloride 0.9% 100 ml infusion Stopped (11/18/21 0100)    phenylephrine (KRISTAL-SYNEPHRINE) 50mg/250mL infusion Stopped (11/19/21 1438)    dextrose      dexmedetomidine Stopped (11/20/21 1653)    sodium chloride Stopped (11/17/21 0709)    [Held by provider] sodium chloride 50 mL/hr at 10/28/21 2314       PHYSICAL EXAMINATION:  T:  98.9  P: 95 RR: 24 B/P: 129/52 FiO2: 30. O2 Sat:  94%. I/O: 2351/2850 net -499  Body mass index is 37.74 kg/m². GCS:  15  PC: 16 PEEP: 6: TV: 380: RRTotal: 15:     General: Acute on chronically ill-appearing elderly white female  HEENT: Temporal wasting appreciated. Normocephalic and atraumatic. No scleral icterus. PERR  Neck: supple. No Thyromegaly. Left subclavian dialysis catheter   Lungs: Mild diffuse rhonchi appreciated bilaterally No retractions  Cardiac: RRR. No JVD. Abdomen: soft. Nontender. ,npehrostomy tube with bloody drainage  Extremities:  +1 pitting edema x4. (interval improvement noted) No clubbing, cyanosis   Vasculature: capillary refill < 3 seconds. Palpable dorsalis pedis pulses. Skin:  warm and dry. Psych: Alert and oriented to person place and time, affect appropriate  Lymph:  No supraclavicular adenopathy. Neurologic:  No focal deficit. No seizures. Data: (All radiographs, tracings, PFTs, and imaging are personally viewed and interpreted unless otherwise noted).  CMP: Sodium 131 potassium 3.5 chloride 96 bicarb 25 BUN/creatinine 31/2.5 anion gap 10 ionized calcium 1.19 magnesium 1.9 glucose 113 calcium 8.2 phosphorus 4.1 total protein 5.9    CBC WBC 7.2 H&H 8.2/26.9 platelets 500   SARS-CoV-2 NAAT negative on 10/27/2021   Telemetry shows NSR   Chest x-ray 10/31/2021 demonstrated diffuse bilateral patchy opacities with left-sided pleural effusion and cardiomegaly   Respiratory culture  11/5/21 pending   Pneumonia panel collected 10/27/2021 demonstrates staph aureus. MEC-A gene and adenovirus   CT chest on 11/1/2021 demonstrates bilateral pleural effusions with interval enlargement   CXR 11/3/2021 demonstrates worsening pulmonary congestion with bilateral effusions   UA shows bacteria w/ moderate white cells   CT Abdomen and pelvis 11/5/21 showed perinephric stranding with left-sided hypodense nodule with air-fluid level concerning for abscess. Diffuse ascites and anasarca noted.    CXR November 7, 2021 shows bilateral pulmonary edema with worsening right-sided infiltrate   CXR November 9, 2021 shows improvement in bilateral pulmonary edema with worsening left sided infiltrate  Repeat CXR 11/12/21 pending at time of evaluation.     Chest x-ray on 11/19/2021 demonstrates shows worsening bilateral effusions compared to previous films         Meets Continued ICU Level Care Criteria:    [x] Yes   [] No - Transfer Planned to listed location:  [] HOSPITALIST CONTACTED-        Electronically signed by Rosita Navarro, DO PGY-2 on 11/21/2021 at 9:17 AM

## 2021-11-21 NOTE — PROGRESS NOTES
Pt placed on 35% t-piece for 20 minutes. Pt started coughing, prn lidocaine aerosol given. Respiratory rate remained 24-26, pt feeling anxious and slightly short of breath. Returned to ventilator at previous cpap settings.   Tolerated well

## 2021-11-21 NOTE — PROGRESS NOTES
[facility]         Renal Inpatient Follow-Up note         11/21/2021 10:10 AM    Pt Name:   Alcides Lopez  YOB: 1950  Attending:   Lashon Souza MD    Chief Complaint : Alcides Lopez is a 73 y.o. female being followed by nephrology for ALVIN/CKD    Interval History :   Patient seen and examined by me. No distress  Patient has a trach now not much communicative cannot assess history or review of systems  Mild Urine output . Ishan Mariee Currently on a T collar respiratory at bedside  She she will most likely be put back on the vent.   She tolerated dialysis well yesterday     Scheduled Medications :    metoclopramide  5 mg IntraVENous Q8H    sodium chloride (Inhalant)  4 mL Nebulization Q6H    And    albuterol  2.5 mg Nebulization Q6H    midodrine  15 mg Oral Q8H    lactobacillus  1 capsule Per NG tube Daily with breakfast    piperacillin-tazobactam  3,375 mg IntraVENous Q12H    chlorhexidine  15 mL Mouth/Throat BID    ketamine  100 mg IntraVENous Once    lidocaine 1 % injection  5 mL IntraDERmal Once    insulin lispro  0-6 Units SubCUTAneous Q6H    allopurinol  100 mg Oral Daily    phosphorus replacement protocol   Other RX Placeholder    potassium bicarb-citric acid  40 mEq Oral Once    tiotropium-olodaterol  2 puff Inhalation Daily    [Held by provider] aspirin  81 mg Oral Daily    pantoprazole  40 mg IntraVENous QAM    sodium hypochlorite   Irrigation Daily    Riociguat  2.5 mg Oral Q8H    sodium chloride flush  5-40 mL IntraVENous 2 times per day    FLUoxetine  40 mg Oral Daily    [Held by provider] metoprolol tartrate  12.5 mg Oral BID      dextrose 30 mL/hr at 11/21/21 0849    sodium chloride      midazolam      fentaNYL 500 mcg in sodium chloride 0.9% 100 ml infusion Stopped (11/18/21 0100)    phenylephrine (KRISTAL-SYNEPHRINE) 50mg/250mL infusion Stopped (11/19/21 1438)    dextrose      dexmedetomidine Stopped (11/20/21 1653)    sodium chloride Stopped (11/17/21 0709)    [Held by provider] sodium chloride 50 mL/hr at 10/28/21 2314       Vitals :  BP (!) 129/52   Pulse 95   Temp 98.9 °F (37.2 °C) (Oral)   Resp 24   Ht 4' 9\" (1.448 m)   Wt 174 lb 6.1 oz (79.1 kg)   SpO2 94%   BMI 37.74 kg/m²     24HR INTAKE/OUTPUT:      Intake/Output Summary (Last 24 hours) at 11/21/2021 1010  Last data filed at 11/21/2021 0500  Gross per 24 hour   Intake 2340.98 ml   Output 2850 ml   Net -509.02 ml     Last 3 weights  Wt Readings from Last 3 Encounters:   11/20/21 174 lb 6.1 oz (79.1 kg)   10/25/21 164 lb (74.4 kg)   09/29/21 160 lb (72.6 kg)           Physical Exam :  General Appearance:  Well developed. No distress  Mouth/Throat:  Oral mucosa moist.  Trach in place  Neck:  Supple, no JVD  Lungs:  Breath sounds: clear, diminished  Heart[de-identified]  S1,S2 heard  Abdomen:  Soft, non - tender  Musculoskeletal:  Edema - noted but appears better         Last 3 CBC   Recent Labs     11/19/21  0400 11/19/21  0900 11/19/21  2022 11/20/21  0455 11/21/21  0410   WBC 10.0  --   --  8.7 7.2   RBC 2.45*  --   --  3.00* 2.78*   HGB 7.3*   < > 8.7* 8.8* 8.2*   HCT 24.1*   < > 27.5* 28.6* 26.9*   *  --   --  113* 116*    < > = values in this interval not displayed. Last 3 CMP  Recent Labs     11/19/21  0400 11/19/21  0400 11/19/21  1440 11/20/21  0455 11/21/21  0410   *  --   --  131* 131*   K 5.6*   < > 5.4* 5.3* 3.5   CL 99  --   --  97* 96*   CO2 22*  --   --  20* 25   BUN 45*  --   --  55* 31*   CREATININE 2.6*  --   --  3.1* 2.5*   CALCIUM 8.3*  --   --  8.2* 8.2*   LABALBU 2.5*  --   --  2.7* 2.4*   BILITOT 0.5  --   --  0.8 0.4    < > = values in this interval not displayed. ASSESSMENT / Plan   1. Renal -acute kidney injury, multifactorial etiology which includes hypotension from sepsis, IV contrast exposure on 10/29 and has some hydronephrosis as well . ? Patient's creatinine continued to get worse, but primarily fluid status worsened, she was started on CVVH. Has been off for a few days  ? Intradialytic creatinine is rising. Status post IHD 11/20/2021  ? No acute need for dialysis today reevaluate tomorrow     2. Electrolytes - mild hyponatremia adjust with dialysis, this may be due to the D10 she is receiving  3. Hyperkalemia -better after dialysis  4. Hypoglycemia -continues to be on D10  5. Mild acidosis corrected with dialysis  6. Acute hypercapnic respiratory failure currently ventilator dependent now with a trach  7. Pneumonia with pleural effusion and septic shock  8. Hypotension improved currently off pressors. Continue midodrine  9. Hydronephrosis status post nephrostomy tube placement  10. + ANCA, patient has been having some proteinuria as an outpatient and during my last visit have ordered all the serologic work-up , which the patient has done prior to getting admitted to the hospital however the ANCA was not done at that time. She may be having some renal pathology going on , but I doubt it has anything to do with acute renal dysfunction during this admission. However she has received steroids. Once clinically stable might consider a renal biopsy. 6. Meds reviewed     Dr. Rosendo Santo MD, M,D.  [facility].

## 2021-11-22 ENCOUNTER — ANESTHESIA EVENT (OUTPATIENT)
Dept: ENDOSCOPY | Age: 71
DRG: 004 | End: 2021-11-22
Payer: MEDICARE

## 2021-11-22 ENCOUNTER — ANESTHESIA (OUTPATIENT)
Dept: ENDOSCOPY | Age: 71
DRG: 004 | End: 2021-11-22
Payer: MEDICARE

## 2021-11-22 VITALS — OXYGEN SATURATION: 100 % | SYSTOLIC BLOOD PRESSURE: 89 MMHG | DIASTOLIC BLOOD PRESSURE: 52 MMHG

## 2021-11-22 PROBLEM — I48.0 PAROXYSMAL ATRIAL FIBRILLATION (HCC): Status: ACTIVE | Noted: 2021-11-22

## 2021-11-22 LAB
ALBUMIN SERPL-MCNC: 2.4 G/DL (ref 3.5–5.1)
ALP BLD-CCNC: 106 U/L (ref 38–126)
ALT SERPL-CCNC: 13 U/L (ref 11–66)
ANION GAP SERPL CALCULATED.3IONS-SCNC: 13 MEQ/L (ref 8–16)
AST SERPL-CCNC: 12 U/L (ref 5–40)
BASOPHILS # BLD: 1.6 %
BASOPHILS ABSOLUTE: 0.1 THOU/MM3 (ref 0–0.1)
BILIRUB SERPL-MCNC: 0.4 MG/DL (ref 0.3–1.2)
BUN BLDV-MCNC: 39 MG/DL (ref 7–22)
CALCIUM IONIZED: 1.23 MMOL/L (ref 1.12–1.32)
CALCIUM SERPL-MCNC: 8.3 MG/DL (ref 8.5–10.5)
CHLORIDE BLD-SCNC: 93 MEQ/L (ref 98–111)
CO2: 23 MEQ/L (ref 23–33)
CREAT SERPL-MCNC: 3 MG/DL (ref 0.4–1.2)
EOSINOPHIL # BLD: 3 %
EOSINOPHILS ABSOLUTE: 0.2 THOU/MM3 (ref 0–0.4)
ERYTHROCYTE [DISTWIDTH] IN BLOOD BY AUTOMATED COUNT: 16.7 % (ref 11.5–14.5)
ERYTHROCYTE [DISTWIDTH] IN BLOOD BY AUTOMATED COUNT: 57.6 FL (ref 35–45)
GFR SERPL CREATININE-BSD FRML MDRD: 15 ML/MIN/1.73M2
GLUCOSE BLD-MCNC: 104 MG/DL (ref 70–108)
GLUCOSE BLD-MCNC: 110 MG/DL (ref 70–108)
GLUCOSE BLD-MCNC: 119 MG/DL (ref 70–108)
GLUCOSE BLD-MCNC: 124 MG/DL (ref 70–108)
GLUCOSE BLD-MCNC: 165 MG/DL (ref 70–108)
HCT VFR BLD CALC: 26.8 % (ref 37–47)
HEMOGLOBIN: 8.4 GM/DL (ref 12–16)
IMMATURE GRANS (ABS): 0.05 THOU/MM3 (ref 0–0.07)
IMMATURE GRANULOCYTES: 0.9 %
LYMPHOCYTES # BLD: 8.2 %
LYMPHOCYTES ABSOLUTE: 0.5 THOU/MM3 (ref 1–4.8)
MAGNESIUM: 1.9 MG/DL (ref 1.6–2.4)
MCH RBC QN AUTO: 29.7 PG (ref 26–33)
MCHC RBC AUTO-ENTMCNC: 31.3 GM/DL (ref 32.2–35.5)
MCV RBC AUTO: 94.7 FL (ref 81–99)
MONOCYTES # BLD: 12 %
MONOCYTES ABSOLUTE: 0.7 THOU/MM3 (ref 0.4–1.3)
NUCLEATED RED BLOOD CELLS: 0 /100 WBC
PHOSPHORUS: 4.1 MG/DL (ref 2.4–4.7)
PLATELET # BLD: 130 THOU/MM3 (ref 130–400)
PMV BLD AUTO: 9.2 FL (ref 9.4–12.4)
POTASSIUM SERPL-SCNC: 3.7 MEQ/L (ref 3.5–5.2)
RBC # BLD: 2.83 MILL/MM3 (ref 4.2–5.4)
SEG NEUTROPHILS: 74.3 %
SEGMENTED NEUTROPHILS ABSOLUTE COUNT: 4.3 THOU/MM3 (ref 1.8–7.7)
SODIUM BLD-SCNC: 129 MEQ/L (ref 135–145)
TOTAL PROTEIN: 4.7 G/DL (ref 6.1–8)
WBC # BLD: 5.8 THOU/MM3 (ref 4.8–10.8)

## 2021-11-22 PROCEDURE — 2580000003 HC RX 258: Performed by: INTERNAL MEDICINE

## 2021-11-22 PROCEDURE — 0BJ08ZZ INSPECTION OF TRACHEOBRONCHIAL TREE, VIA NATURAL OR ARTIFICIAL OPENING ENDOSCOPIC: ICD-10-PCS | Performed by: INTERNAL MEDICINE

## 2021-11-22 PROCEDURE — C9113 INJ PANTOPRAZOLE SODIUM, VIA: HCPCS | Performed by: NURSE PRACTITIONER

## 2021-11-22 PROCEDURE — 6370000000 HC RX 637 (ALT 250 FOR IP): Performed by: STUDENT IN AN ORGANIZED HEALTH CARE EDUCATION/TRAINING PROGRAM

## 2021-11-22 PROCEDURE — 7100000001 HC PACU RECOVERY - ADDTL 15 MIN: Performed by: INTERNAL MEDICINE

## 2021-11-22 PROCEDURE — 2060000000 HC ICU INTERMEDIATE R&B

## 2021-11-22 PROCEDURE — 6360000002 HC RX W HCPCS: Performed by: PHARMACIST

## 2021-11-22 PROCEDURE — 94761 N-INVAS EAR/PLS OXIMETRY MLT: CPT

## 2021-11-22 PROCEDURE — 6360000002 HC RX W HCPCS: Performed by: NURSE PRACTITIONER

## 2021-11-22 PROCEDURE — 6370000000 HC RX 637 (ALT 250 FOR IP): Performed by: NURSE PRACTITIONER

## 2021-11-22 PROCEDURE — 97530 THERAPEUTIC ACTIVITIES: CPT

## 2021-11-22 PROCEDURE — 97110 THERAPEUTIC EXERCISES: CPT

## 2021-11-22 PROCEDURE — 94003 VENT MGMT INPAT SUBQ DAY: CPT

## 2021-11-22 PROCEDURE — 82330 ASSAY OF CALCIUM: CPT

## 2021-11-22 PROCEDURE — 3700000000 HC ANESTHESIA ATTENDED CARE: Performed by: INTERNAL MEDICINE

## 2021-11-22 PROCEDURE — 85025 COMPLETE CBC W/AUTO DIFF WBC: CPT

## 2021-11-22 PROCEDURE — 99232 SBSQ HOSP IP/OBS MODERATE 35: CPT | Performed by: UROLOGY

## 2021-11-22 PROCEDURE — 6360000002 HC RX W HCPCS: Performed by: STUDENT IN AN ORGANIZED HEALTH CARE EDUCATION/TRAINING PROGRAM

## 2021-11-22 PROCEDURE — 2580000003 HC RX 258: Performed by: EMERGENCY MEDICINE

## 2021-11-22 PROCEDURE — 3609027000 HC BRONCHOSCOPY: Performed by: INTERNAL MEDICINE

## 2021-11-22 PROCEDURE — 3700000001 HC ADD 15 MINUTES (ANESTHESIA): Performed by: INTERNAL MEDICINE

## 2021-11-22 PROCEDURE — 80053 COMPREHEN METABOLIC PANEL: CPT

## 2021-11-22 PROCEDURE — 31622 DX BRONCHOSCOPE/WASH: CPT | Performed by: INTERNAL MEDICINE

## 2021-11-22 PROCEDURE — 99233 SBSQ HOSP IP/OBS HIGH 50: CPT | Performed by: INTERNAL MEDICINE

## 2021-11-22 PROCEDURE — 2580000003 HC RX 258: Performed by: STUDENT IN AN ORGANIZED HEALTH CARE EDUCATION/TRAINING PROGRAM

## 2021-11-22 PROCEDURE — 7100000000 HC PACU RECOVERY - FIRST 15 MIN: Performed by: INTERNAL MEDICINE

## 2021-11-22 PROCEDURE — 83735 ASSAY OF MAGNESIUM: CPT

## 2021-11-22 PROCEDURE — 84100 ASSAY OF PHOSPHORUS: CPT

## 2021-11-22 PROCEDURE — 2700000000 HC OXYGEN THERAPY PER DAY

## 2021-11-22 PROCEDURE — 6370000000 HC RX 637 (ALT 250 FOR IP): Performed by: FAMILY MEDICINE

## 2021-11-22 PROCEDURE — 99223 1ST HOSP IP/OBS HIGH 75: CPT | Performed by: INTERNAL MEDICINE

## 2021-11-22 PROCEDURE — 6360000002 HC RX W HCPCS: Performed by: INTERNAL MEDICINE

## 2021-11-22 PROCEDURE — 82948 REAGENT STRIP/BLOOD GLUCOSE: CPT

## 2021-11-22 PROCEDURE — 6370000000 HC RX 637 (ALT 250 FOR IP): Performed by: INTERNAL MEDICINE

## 2021-11-22 PROCEDURE — 94640 AIRWAY INHALATION TREATMENT: CPT

## 2021-11-22 PROCEDURE — 6360000002 HC RX W HCPCS

## 2021-11-22 PROCEDURE — 36415 COLL VENOUS BLD VENIPUNCTURE: CPT

## 2021-11-22 RX ORDER — ONDANSETRON 2 MG/ML
INJECTION INTRAMUSCULAR; INTRAVENOUS PRN
Status: DISCONTINUED | OUTPATIENT
Start: 2021-11-22 | End: 2021-11-22 | Stop reason: SDUPTHER

## 2021-11-22 RX ORDER — DEXAMETHASONE SODIUM PHOSPHATE 4 MG/ML
INJECTION, SOLUTION INTRA-ARTICULAR; INTRALESIONAL; INTRAMUSCULAR; INTRAVENOUS; SOFT TISSUE PRN
Status: DISCONTINUED | OUTPATIENT
Start: 2021-11-22 | End: 2021-11-22 | Stop reason: SDUPTHER

## 2021-11-22 RX ORDER — TOBRAMYCIN INHALATION SOLUTION 300 MG/5ML
300 INHALANT RESPIRATORY (INHALATION) 2 TIMES DAILY
Status: DISCONTINUED | OUTPATIENT
Start: 2021-11-22 | End: 2021-11-23

## 2021-11-22 RX ADMIN — SODIUM HYPOCHLORITE: 1.25 SOLUTION TOPICAL at 11:59

## 2021-11-22 RX ADMIN — CHLORHEXIDINE GLUCONATE 15 ML: 1.2 RINSE ORAL at 10:24

## 2021-11-22 RX ADMIN — FLUOXETINE 40 MG: 20 CAPSULE ORAL at 11:50

## 2021-11-22 RX ADMIN — METOCLOPRAMIDE HYDROCHLORIDE 5 MG: 5 INJECTION INTRAMUSCULAR; INTRAVENOUS at 01:04

## 2021-11-22 RX ADMIN — TIOTROPIUM BROMIDE AND OLODATEROL 2 PUFF: 3.124; 2.736 SPRAY, METERED RESPIRATORY (INHALATION) at 09:21

## 2021-11-22 RX ADMIN — SODIUM CHLORIDE SOLN NEBU 3% 4 ML: 3 NEBU SOLN at 17:37

## 2021-11-22 RX ADMIN — DEXTROSE MONOHYDRATE: 100 INJECTION, SOLUTION INTRAVENOUS at 03:36

## 2021-11-22 RX ADMIN — METOCLOPRAMIDE HYDROCHLORIDE 5 MG: 5 INJECTION INTRAMUSCULAR; INTRAVENOUS at 10:25

## 2021-11-22 RX ADMIN — ALBUTEROL SULFATE 2.5 MG: 2.5 SOLUTION RESPIRATORY (INHALATION) at 13:21

## 2021-11-22 RX ADMIN — DEXTROSE MONOHYDRATE: 100 INJECTION, SOLUTION INTRAVENOUS at 11:59

## 2021-11-22 RX ADMIN — Medication 1 CAPSULE: at 11:50

## 2021-11-22 RX ADMIN — PIPERACILLIN AND TAZOBACTAM 3375 MG: 3; .375 INJECTION, POWDER, LYOPHILIZED, FOR SOLUTION INTRAVENOUS at 22:38

## 2021-11-22 RX ADMIN — ALLOPURINOL 100 MG: 100 TABLET ORAL at 11:50

## 2021-11-22 RX ADMIN — PANTOPRAZOLE SODIUM 40 MG: 40 INJECTION, POWDER, FOR SOLUTION INTRAVENOUS at 10:25

## 2021-11-22 RX ADMIN — DEXTROSE MONOHYDRATE: 100 INJECTION, SOLUTION INTRAVENOUS at 20:49

## 2021-11-22 RX ADMIN — ONDANSETRON HYDROCHLORIDE 4 MG: 4 INJECTION, SOLUTION INTRAMUSCULAR; INTRAVENOUS at 15:59

## 2021-11-22 RX ADMIN — CHLORHEXIDINE GLUCONATE 15 ML: 1.2 RINSE ORAL at 21:04

## 2021-11-22 RX ADMIN — DEXAMETHASONE SODIUM PHOSPHATE 8 MG: 4 INJECTION, SOLUTION INTRAMUSCULAR; INTRAVENOUS at 15:59

## 2021-11-22 RX ADMIN — SODIUM CHLORIDE, PRESERVATIVE FREE 10 ML: 5 INJECTION INTRAVENOUS at 21:04

## 2021-11-22 RX ADMIN — METOCLOPRAMIDE HYDROCHLORIDE 5 MG: 5 INJECTION INTRAMUSCULAR; INTRAVENOUS at 18:55

## 2021-11-22 RX ADMIN — ALBUTEROL SULFATE 2.5 MG: 2.5 SOLUTION RESPIRATORY (INHALATION) at 17:29

## 2021-11-22 RX ADMIN — SODIUM CHLORIDE, PRESERVATIVE FREE 10 ML: 5 INJECTION INTRAVENOUS at 10:35

## 2021-11-22 RX ADMIN — SODIUM CHLORIDE: 9 INJECTION, SOLUTION INTRAVENOUS at 15:24

## 2021-11-22 RX ADMIN — PIPERACILLIN AND TAZOBACTAM 3375 MG: 3; .375 INJECTION, POWDER, LYOPHILIZED, FOR SOLUTION INTRAVENOUS at 10:29

## 2021-11-22 ASSESSMENT — PULMONARY FUNCTION TESTS
PIF_VALUE: 1
PIF_VALUE: 25
PIF_VALUE: 24
PIF_VALUE: 0
PIF_VALUE: 3
PIF_VALUE: 25
PIF_VALUE: 0
PIF_VALUE: 31
PIF_VALUE: 0
PIF_VALUE: 0
PIF_VALUE: 23
PIF_VALUE: 16
PIF_VALUE: 0
PIF_VALUE: 36
PIF_VALUE: 0
PIF_VALUE: 36
PIF_VALUE: 37
PIF_VALUE: 35
PIF_VALUE: 42
PIF_VALUE: 0
PIF_VALUE: 27
PIF_VALUE: 0
PIF_VALUE: 16
PIF_VALUE: 0
PIF_VALUE: 24
PIF_VALUE: 24
PIF_VALUE: 39
PIF_VALUE: 25
PIF_VALUE: 0
PIF_VALUE: 35
PIF_VALUE: 30
PIF_VALUE: 34
PIF_VALUE: 30
PIF_VALUE: 0
PIF_VALUE: 0
PIF_VALUE: 29
PIF_VALUE: 16
PIF_VALUE: 16
PIF_VALUE: 0
PIF_VALUE: 25
PIF_VALUE: 0
PIF_VALUE: 23
PIF_VALUE: 1
PIF_VALUE: 24
PIF_VALUE: 0

## 2021-11-22 ASSESSMENT — PAIN SCALES - GENERAL
PAINLEVEL_OUTOF10: 0

## 2021-11-22 NOTE — ANESTHESIA POSTPROCEDURE EVALUATION
Department of Anesthesiology  Postprocedure Note    Patient: Gin Steven  MRN: [id number]  YOB: 1950  Date of evaluation: 11/22/2021  Time:  5:59 PM     Procedure Summary     Date: 11/22/21 Room / Location: [address] [place] [address]    Anesthesia Start: 1957 Anesthesia Stop: 1626    Procedure: BRONCHOSCOPY (N/A ) Diagnosis: (SOB)    Surgeons: Parth Person MD Responsible Provider: Jeremiah Gramajo MD    Anesthesia Type: general ASA Status: 4          Anesthesia Type: No value filed. Michelle Phase I: Michelle Score: 8    Michelle Phase II:      Last vitals: Reviewed and per EMR flowsheets. Anesthesia Post Evaluation    Patient location during evaluation: PACU  Patient participation: complete - patient participated  Level of consciousness: awake and alert  Airway patency: patent  Nausea & Vomiting: no nausea and no vomiting  Complications: no  Cardiovascular status: hemodynamically stable  Respiratory status: acceptable  Hydration status: euvolemic      [facility]  POST-ANESTHESIA NOTE       Name:  Gin Steven                                         Age:  73 y.o.   MRN:  [id number]      Last Vitals:  /60   Pulse 89   Temp 98.4 °F (36.9 °C) (Oral)   Resp 17   Ht 4' 9\" (1.448 m)   Wt 171 lb (77.6 kg)   SpO2 98%   BMI 37.00 kg/m²   Patient Vitals for the past 4 hrs:   BP Temp Temp src Pulse Resp SpO2   11/22/21 1717 126/60 98.4 °F (36.9 °C) Oral 89 17 98 %   11/22/21 1700 -- -- -- 91 19 100 %   11/22/21 1655 133/61 -- -- 91 17 100 %   11/22/21 1650 (!) 140/65 -- -- 87 19 100 %   11/22/21 1645 133/61 -- -- 88 18 100 %   11/22/21 1640 132/63 -- -- 86 18 100 %   11/22/21 1635 133/63 -- -- 87 17 100 %   11/22/21 1630 133/63 -- -- 84 18 100 %   11/22/21 1625 137/64 -- -- 86 19 100 %   11/22/21 1620 -- -- -- 88 19 100 %   11/22/21 1615 (!) 141/63 98.3 °F (36.8 °C) Temporal 95 17 100 %   11/22/21 1531 (!) 121/59 -- -- 87 16 100 %   11/22/21 1505 -- -- -- -- -- 100 %       Level of Consciousness:  Awake    Respiratory:  Stable    Oxygen Saturation:  Stable    Cardiovascular:  Stable    Hydration:  Adequate    PONV:  Stable    Post-op Pain:  Adequate analgesia    Post-op Assessment:  No apparent anesthetic complications    Additional Follow-Up / Treatment / Comment:  None    Mireya Lechuga MD  November 22, 2021   5:59 PM

## 2021-11-22 NOTE — PROGRESS NOTES
1059: RN called Kaylie Messina to obtain consent from Pulmonology to perform a bronchoscopy d/t bleeding noted when suctioning. RN left a message on phone to call back nurse. 1109: Patient's Niece at bedside who had called Kaylie Messina (sister/POA) to obtain consent over the phone. This RN and Jevon Tubbs able to obtain consent over the phone for patient to have bronchoscopy performed today.

## 2021-11-22 NOTE — OP NOTE
Bronchoscopy. A Time Out was held and the above information confirmed. The fiberoptic bronchoscope adapter (Swivel Adapter) was connected to the tracheostomy tube. After lubricating the Fiberoptic Bronchoscope it was gradually advanced through the fiberoptic bronchoscope adapter (Swivel Adapter) in to the tracheostomy tube. The lower end of endotracheal tube was 6 centimeters above the avelina. The scope was then passed into the trachea. The avelina is sharp with no growths. Careful inspection of the tracheal lumen was accomplished with no growths. No active bleeding noted. The scope was  advanced into the right main bronchus and then into the Right upper lobe, Right middle lobe, and Right lower lobe bronchi and segmental bronchi. Old blood stained mucus noted in the right lower lobe- especially from the lateral segment of right lower lobe. No active bleeding. The scope was sequentially passed into the left main and then left upper and lower bronchi and segmental bronchi. Endobronchial findings:   Trachea: Normal mucosa. Avelina: Normal mucosa  Right main bronchus: Normal mucosa  Right upper lobe bronchus: Normal mucosa  Right Middle lobe bronchus: Normal mucosa  Right Lower lobe bronchus:Normal mucosa. Old blood stained mucus noted in the right lower lobe- especially from the lateral segment of right lower lobe. No active bleeding. Left main bronchus: Normal mucosa  Left upper lobe bronchus: Normal mucosa  Left lower lobe bronchus: Normal mucosa    No endobronchial lesions seen. Estimated Blood Loss: Less than 5ml. Complications: None. Recommendation/Plan: Will follow patient as in patient. Attestation: I performed the procedure.     Gil Ruano MD    Electronically signed by Gil Ruano MD on 11/22/2021 at 4:16 PM

## 2021-11-22 NOTE — PROGRESS NOTES
Comprehensive Nutrition Assessment    Type and Reason for Visit:  Reassess    Nutrition Recommendations/Plan:   Nepro carb steady at 30 ml/hr (goal). Flush 2.5 oz. Proteinex daily. If no IVF - flush 240 ml free water TID  Will monitor TF tolerance, labs, weights vs. Need to adjust TF regimen, free water flushes. Nutrition Assessment:    Pt. Improving from a nutritional standpoint AEB tolerating TF at goal.  At risk for further nutrition compromise r/t admitted with acute respiratory failure, intubated 10/27, extubated 11/9, reintubated 11/15, AFib, ALVIN, CRRT & HD  this admit, large worsening sacral wound, stage IV,  11/1 nephrosotmy tube placement, anemia, concern for cholecystitis, TF intolerances this admit and underlying medical condition (CKD, s/p ID of buttock wound 8/6/21,CHF, gout, pulmonary HTN, obesity).  Nutrition recommendations/interventions as per above    Malnutrition Assessment:  Malnutrition Status: At risk for malnutrition (Comment)    Context:  Acute Illness     Findings of the 6 clinical characteristics of malnutrition:  Energy Intake:  1 - 75% or less of estimated energy requirements for 7 or more days  Weight Loss:   (5.8% weight loss in 3.5 months)     Body Fat Loss:  No significant body fat loss     Muscle Mass Loss:  Unable to assess    Fluid Accumulation:  1 - Mild     Strength:  Not Performed    Estimated Daily Nutrient Needs:  Energy (kcal):  6898-4384 kcals (15-18); Weight Used for Energy Requirements:  Admission (75.1 kg)     Protein (g):  63-84 gm (1.5-2) as renal status allows (wounds/HD); Weight Used for Protein Requirements:  Ideal        Fluid (ml/day):  ~1500 ml (HD); Method Used for Fluid Requirements:  Other (Comment)      Nutrition Related Findings:    Patient intubated 10/27, extubated 11/9, failed swallow evaluation per Speech Therapy 11/10 , reintubated 11/15; +trach, tolerating TF at 30 ml/hr per RN; 125 ml rectal tube output past 24 hours; pt.  Shakes head no to nausea or abdominal pain when asked; +HD - no acute need for dialysis today, re-evaluate tomorrow per Nephrology; Rx includes Reglan, ATB, Culturelle, Ketamine, Senokot, Glycolax, Zofran, IVF 30 ml/hr; 11/22: Sodium 129, Potassium 3.7, BUN 39, Cr 3.0, Magnesium 1.9, Phosphorus 4.1, Glucose 104; spoke with MD Naila chauhan free water flushes; per GI - plan PEG placement later this week    Wounds:  Stage IV (large worsening sacral wound, stage IV, with area of necrosis)       Current Nutrition Therapies:    Diet NPO  ADULT TUBE FEEDING; Orogastric; Renal Formula; Continuous; 10; Yes; 10; Q 8 hours; 30; 30; Q 4 hours; Protein; 1 ( 2.5 oz) liquid protein daily  Current Tube Feeding (TF) Orders:  · Feeding Route: Nasogastric (dobhoff)  · Formula: Renal Formula (Nepro)  · Schedule: Continuous (goal 30 ml/hr)  · Additives/Modulars:  (flush 1 ( 2.5 oz) liquid protein daily)  · Water Flushes: 240 ml TID if no IVF  · Current TF & Flush Orders Provides: at goal  · Goal TF & Flush Orders Provides: Nepro carb steady at 30 ml/hr with 2.5 oz.  Proteinex daily provides pt. with 1378 kcals (1274 TF, 104 modular), 84 gm protein (58 TF, 26 modular), 115 gm CHO, 18 gm fiber, 1243 ml free water (523 TF, 720 flushes) in 1515 ml volume (720 TF, 75 modular, 720 flushes)      Anthropometric Measures:  · Height: 4' 9\" (144.8 cm)  · Current Body Weight: 171 lb (77.6 kg) (11/22 +2 edema)   · Admission Body Weight: 165 lb 9.1 oz (75.1 kg) (10/27 +1 edema)    · Usual Body Weight: 175 lb 11.3 oz (79.7 kg) (per EMR 7/16/21)     · Ideal Body Weight: 85 lbs; % Ideal Body Weight 194.8 %   · BMI: 37  · Adjusted Body Weight:  ;  (IBW ~93 #)   · BMI Categories: Obese Class 2 (BMI 35.0 -39.9) (on admit)       Nutrition Diagnosis:   · Inadequate oral intake related to swallowing difficulty as evidenced by NPO or clear liquid status due to medical condition, nutrition support - enteral nutrition      Nutrition Interventions:   Food and/or Nutrient Delivery: Continue Current Tube Feeding  Nutrition Education/Counseling:  Education initiated (11/22 Discussed TF monitoring w/ pt's family)   Coordination of Nutrition Care:  Continue to monitor while inpatient    Goals:  Tube feeding to provide 75% or more of estimated nutrient needs until able to transition to oral diet during LOS       Nutrition Monitoring and Evaluation:   Behavioral-Environmental Outcomes:  None Identified   Food/Nutrient Intake Outcomes:  Enteral Nutrition Intake/Tolerance  Physical Signs/Symptoms Outcomes:  Biochemical Data, Chewing or Swallowing, GI Status, Fluid Status or Edema, Nutrition Focused Physical Findings, Skin, Weight     Discharge Planning:     Too soon to determine     Electronically signed by Tray Mendoza RD, LD on 11/22/21 at 12:12 PM EST    Contact: [phone]

## 2021-11-22 NOTE — ANESTHESIA PRE PROCEDURE
Department of Anesthesiology  Preprocedure Note       Name:  Violet Kerr   Age:  73 y.o.  :  1950                                          MRN:  [id number]         Date:  2021      Surgeon: Billy Query):  Bhakti Villalobos MD    Procedure: Procedure(s):  BRONCHOSCOPY    Medications prior to admission:   Prior to Admission medications    Medication Sig Start Date End Date Taking? Authorizing Provider   metoprolol tartrate (LOPRESSOR) 25 MG tablet Take 0.5 tablets by mouth 2 times daily 10/25/21   LANE Uribe CNP   sodium hypochlorite (DAKINS) 0.125 % SOLN external solution Apply Dakin's moistened gauze dressings to wound twice daily and as needed. 21   LANE Urbano CNP   sodium chloride 1 g tablet Take 1 tablet by mouth 2 times daily 8/3/21   Neris Blount MD   allopurinol (ZYLOPRIM) 300 MG tablet Take 1 tablet by mouth daily 21   Edwina Garcia MD   FLUoxetine (PROZAC) 40 MG capsule Take 1 capsule by mouth daily 21   Edwina Garcia MD   potassium chloride (KLOR-CON M) 10 MEQ extended release tablet Take 1 tablet by mouth every other day 21   LANE Alarcon CNP   bumetanide (BUMEX) 1 MG tablet Take 1 tablet by mouth daily 21   LANE Alarcon CNP   Multiple Vitamins-Minerals (MULTIVITAMIN WOMEN PO) Take 1 tablet by mouth daily    Historical Provider, MD   acetaminophen (TYLENOL) 650 MG extended release tablet Take 650 mg by mouth every 8 hours as needed for Pain    Historical Provider, MD   Riociguat (ADEMPAS) 2.5 MG TABS Take 2.5 mg by mouth 3 times daily    Historical Provider, MD   docusate sodium (COLACE) 100 MG capsule Take 100 mg by mouth as needed     Historical Provider, MD   aspirin 81 MG tablet Take 81 mg by mouth daily     Historical Provider, MD       Current medications:    No current facility-administered medications for this visit. No current outpatient medications on file.      Facility-Administered Medications Ordered in Other Visits   Medication Dose Route Frequency Provider Last Rate Last Admin    tobramycin (PF) (DORA) nebulizer solution 300 mg  300 mg Nebulization BID Javi Polo MD        LORazepam (ATIVAN) injection 1 mg  1 mg IntraVENous Q6H PRN Cindy Trejo MD   1 mg at 11/21/21 2336    lidocaine PF 4 % injection 4 mL  4 mL Inhalation 4x Daily PRN JayaLANE Cho - CNP   4 mL at 11/21/21 0928    metoclopramide (REGLAN) injection 5 mg  5 mg IntraVENous Q8H Celestina Borges [facility]   5 mg at 11/22/21 1025    sodium chloride (Inhalant) 3 % nebulizer solution 4 mL  4 mL Nebulization Q6H Natividad Camacho MD   4 mL at 11/22/21 1737    And    albuterol (PROVENTIL) nebulizer solution 2.5 mg  2.5 mg Nebulization Q6H Natividad Camacho MD   2.5 mg at 11/22/21 1729    dextrose 10 % infusion   IntraVENous Continuous Tezivon Rodriguez MD 30 mL/hr at 11/22/21 1159 New Bag at 11/22/21 1159    ondansetron (ZOFRAN) injection 4 mg  4 mg IntraVENous Q6H PRN [place] Su, DO   4 mg at 11/19/21 0956    lactobacillus (CULTURELLE) capsule 1 capsule  1 capsule Per NG tube Daily with breakfast Maiada Tru Anand DO   1 capsule at 11/22/21 1150    piperacillin-tazobactam (ZOSYN) 3,375 mg in dextrose 5 % 50 mL IVPB extended infusion (mini-bag)  3,375 mg IntraVENous Q12H Rad Anand DO 12.5 mL/hr at 11/22/21 1029 3,375 mg at 11/22/21 1029    chlorhexidine (PERIDEX) 0.12 % solution 15 mL  15 mL Mouth/Throat BID Jayamodesto Julien APRBELLA - CNP   15 mL at 11/22/21 1024    ketamine (KETALAR) injection 100 mg  100 mg IntraVENous Once [place], DO        lidocaine PF 1 % injection 5 mL  5 mL IntraDERmal Once Jaya Julien APRN - CNP        insulin lispro (HUMALOG) injection vial 0-6 Units  0-6 Units SubCUTAneous Q6H Nik Cerda, APRN - CNP        albuterol (PROVENTIL) nebulizer solution 2.5 mg  2.5 mg Nebulization Q4H PRN Cindy Trejo MD   2.5 mg at 11/13/21 4861    allopurinol (ZYLOPRIM) tablet 100 mg  100 mg Oral Daily Herlinda Cleaning, DO   100 mg at 11/22/21 1150    phosphorus replacement protocol   Other RX Placeholder Devin Anand DO        fentaNYL (SUBLIMAZE) injection 25 mcg  25 mcg IntraVENous Q1H PRN Fletcher Pugh, DO   25 mcg at 11/21/21 0525    potassium bicarb-citric acid (EFFER-K) effervescent tablet 40 mEq  40 mEq Oral Once Fletcher Pugh DO        glucose (GLUTOSE) 40 % oral gel 15 g  15 g Oral PRN Jocelyn Anand, DO        dextrose 50 % IV solution  12.5 g IntraVENous PRN Fletcher Pugh DO        glucagon (rDNA) injection 1 mg  1 mg IntraMUSCular PRN Jocelyn Anand, DO        dextrose 5 % solution  100 mL/hr IntraVENous PRN Fletcher Pugh DO        tiotropium-olodaterol (STIOLTO) 2.5-2.5 MCG/ACT inhaler 2 puff  2 puff Inhalation Daily Rad Anand DO   2 puff at 11/22/21 0921    polyethylene glycol (GLYCOLAX) packet 17 g  17 g Oral Daily PRN Jocelyn Anand DO   17 g at 11/16/21 1704    [Held by provider] aspirin chewable tablet 81 mg  81 mg Oral Daily Cindy Trejo MD   81 mg at 11/07/21 0824    senna (SENOKOT) tablet 8.6 mg  1 tablet Oral BID PRN Jocelyn Anand DO   8.6 mg at 11/09/21 0823    pantoprazole (PROTONIX) injection 40 mg  40 mg IntraVENous QAM LANE Prieto - CNP   40 mg at 11/22/21 1025    acetaminophen (TYLENOL) tablet 650 mg  650 mg Oral Q4H PRN Mahnaz Giang MD   650 mg at 11/14/21 1322    sodium hypochlorite (DAKINS) 0.125 % external solution   Irrigation Daily Paloma Pavan V, DO   Given at 11/22/21 1159    0.9 % sodium chloride infusion  25 mL IntraVENous PRN Gen Desai V, DO   Stopped at 11/17/21 0709    Riociguat TABS 2.5 mg  2.5 mg Oral Q8H Palmoa Pavan V, DO   2.5 mg at 11/22/21 0601    sodium chloride flush 0.9 % injection 5-40 mL  5-40 mL IntraVENous 2 times per day Jewell Jewels, DO   10 mL at 11/22/21 1035    sodium chloride flush 0.9 % injection 5-40 mL  5-40 mL IntraVENous NUHAN Jossy Muse DO        FLUoxetine (PROZAC) capsule 40 mg  40 mg Oral Daily Cindy Trejo MD   40 mg at 11/22/21 1150    [Held by provider] metoprolol tartrate (LOPRESSOR) tablet 12.5 mg  12.5 mg Oral BID Dia Luna MD        [Held by provider] 0.9 % sodium chloride infusion   IntraVENous Continuous Cindy Trejo MD 50 mL/hr at 10/28/21 2314 New Bag at 10/28/21 2314       Allergies:  No Known Allergies    Problem List:    Patient Active Problem List   Diagnosis Code    HTN (hypertension) I10    Chronic depression F32. A    CHF (congestive heart failure) (HCC) I50.9    Thrombocytopenia (HCC) D69.6    Moderate episode of recurrent major depressive disorder (HCC) F33.1    Renal failure syndrome N19    Hyperkalemia E87.5    Isolated non-nephrotic proteinuria R80.0    ALVIN (acute kidney injury) ([facility] [address] N17.9    Chronic right-sided heart failure (HCC) I50.812    Pulmonary HTN (HCC) I27.20    Hypotension due to hypovolemia I95.89, E86.1    Acute renal failure superimposed on stage 4 chronic kidney disease (HCC) N17.9, N18.4    Pressure ulcer of right buttock, stage 3 (HCC) L89.313    Acute respiratory failure (HCC) J96.00    Flash pulmonary edema (HCC) J81.0    Shock (HCC) R57.9    Aspiration pneumonia of left lung (HCC) J69.0    Pleural effusion J90    Paroxysmal atrial fibrillation (HCC) I48.0       Past Medical History:        Diagnosis Date    CH[facility]congestive [facility] failure) [facility]     Dr. Lisa Osorio    Depression     Gout attack     Hypertension     Osteoarthritis     Pulmonary artery hypertension ([facility] [address]     [facility]-- Dr. Liliya Barrientos-- Dr. Foster Barbosa Renal calculi     Dr. Crow Perez Thrombocytopenia [facility])        Past Surgical History:        Procedure Laterality Date    APPENDECTOMY  1960   [facility]    IR NEPHROSTOMY PERCUTANEOUS LEFT  11/1/2021    IR NEPHROSTOMY PERCUTANEOUS LEFT 11/1/2021 Ilsa Velazco MD [facility]    PRESSURE ULCER DEBRIDEMENT Right 8/6/2021    EXCISION RIGHT BUTTOCK WOUND AND CLOSURE performed by Tiffany Barber MD at [address] [facility] History:    Social History     Tobacco Use    Smoking status: Never Smoker    Smokeless tobacco: Never Used   Substance Use Topics    Alcohol use: No                                Counseling given: Not Answered      Vital Signs (Current): There were no vitals filed for this visit.                                            BP Readings from Last 3 Encounters:   11/22/21 126/60   11/22/21 (!) 89/52   10/25/21 108/64       NPO Status:                                                                                 BMI:   Wt Readings from Last 3 Encounters:   11/22/21 171 lb (77.6 kg)   10/25/21 164 lb (74.4 kg)   09/29/21 160 lb (72.6 kg)     There is no height or weight on file to calculate BMI.    CBC:   Lab Results   Component Value Date    WBC 5.8 11/22/2021    RBC 2.83 11/22/2021    RBC 4.15 01/09/2018    HGB 8.4 11/22/2021    HCT 26.8 11/22/2021    MCV 94.7 11/22/2021    RDW 12.9 01/09/2018     11/22/2021       CMP:   Lab Results   Component Value Date     11/22/2021    K 3.7 11/22/2021    K 4.8 11/17/2021    CL 93 11/22/2021    CO2 23 11/22/2021    BUN 39 11/22/2021    CREATININE 3.0 11/22/2021    LABGLOM 15 11/22/2021    GLUCOSE 104 11/22/2021    GLUCOSE 103 01/09/2018    PROT 4.7 11/22/2021    CALCIUM 8.3 11/22/2021    BILITOT 0.4 11/22/2021    ALKPHOS 106 11/22/2021    AST 12 11/22/2021    ALT 13 11/22/2021       POC Tests:   Recent Labs     11/22/21  1133   POCGLU 110*       Coags:   Lab Results   Component Value Date    INR 1.02 11/18/2021    APTT 88.9 11/04/2021       HCG (If Applicable): No results found for: PREGTESTUR, PREGSERUM, HCG, HCGQUANT     ABGs: No results found for: PHART, PO2ART, CHP5TWY, EYC5BVS, BEART, P6PPZMJD     Type & Screen (If Applicable):  Lab Results   Component Value Date    LABRH NEG 11/19/2021       Drug/Infectious Status (If Applicable):  Lab Results   Component Value Date    HEPCAB Negative 10/25/2021       COVID-19 Screening (If Applicable):   Lab Results   Component Value Date    COVID19 NOT  DETECTED 10/27/2021           Anesthesia Evaluation  Patient summary reviewed and Nursing notes reviewed no history of anesthetic complications:   Airway: Mallampati: III        Dental: normal exam         Pulmonary: breath sounds clear to auscultation  (+) pneumonia:                            ROS comment: Respiratory failure with vent dependence and tracheostomy   Cardiovascular:  Exercise tolerance: good (>4 METS),   (+) hypertension:, CHF:,       ECG reviewed  Rhythm: regular  Rate: normal                    Neuro/Psych:   (+) psychiatric history:            GI/Hepatic/Renal: Neg GI/Hepatic/Renal ROS            Endo/Other: Negative Endo/Other ROS                    Abdominal:       Abdomen: soft. Vascular: negative vascular ROS. Other Findings:               Anesthesia Plan      general     ASA 4       Induction: inhalational.    MIPS: Postoperative opioids intended and Prophylactic antiemetics administered. Anesthetic plan and risks discussed with patient and sibling. Plan discussed with CRNA.     Attending anesthesiologist reviewed and agrees with LANE Haas - CRNA   11/22/2021

## 2021-11-22 NOTE — PROGRESS NOTES
[facility]         Renal Inpatient Follow-Up note         11/22/2021 10:26 AM    Pt Name:   Adriane Wall  YOB: 1950  Attending:   Justyna Burgess MD    Chief Complaint : Adriane Wall is a 73 y.o. female being followed by nephrology for ALVIN/CKD    Interval History :   Patient seen and examined by me. No distress  Patient has a trach now not much communicative cannot assess history or review of systems  Mild Urine output.  Currently on a spontaneous mode     Scheduled Medications :    metoclopramide  5 mg IntraVENous Q8H    sodium chloride (Inhalant)  4 mL Nebulization Q6H    And    albuterol  2.5 mg Nebulization Q6H    lactobacillus  1 capsule Per NG tube Daily with breakfast    piperacillin-tazobactam  3,375 mg IntraVENous Q12H    chlorhexidine  15 mL Mouth/Throat BID    ketamine  100 mg IntraVENous Once    lidocaine 1 % injection  5 mL IntraDERmal Once    insulin lispro  0-6 Units SubCUTAneous Q6H    allopurinol  100 mg Oral Daily    phosphorus replacement protocol   Other RX Placeholder    potassium bicarb-citric acid  40 mEq Oral Once    tiotropium-olodaterol  2 puff Inhalation Daily    [Held by provider] aspirin  81 mg Oral Daily    pantoprazole  40 mg IntraVENous QAM    sodium hypochlorite   Irrigation Daily    Riociguat  2.5 mg Oral Q8H    sodium chloride flush  5-40 mL IntraVENous 2 times per day    FLUoxetine  40 mg Oral Daily    [Held by provider] metoprolol tartrate  12.5 mg Oral BID      dextrose 30 mL/hr at 11/22/21 0445    sodium chloride      midazolam      fentaNYL 500 mcg in sodium chloride 0.9% 100 ml infusion Stopped (11/18/21 0100)    phenylephrine (KRISTAL-SYNEPHRINE) 50mg/250mL infusion Stopped (11/19/21 1438)    dextrose      dexmedetomidine Stopped (11/20/21 1653)    sodium chloride Stopped (11/17/21 0709)    [Held by provider] sodium chloride 50 mL/hr at 10/28/21 2314       Vitals :  /60   Pulse 90   Temp 98.5 °F ventilator dependent now with a trach  5. Pneumonia with pleural effusion and septic shock  6. Hypotension improved currently off pressors. Continue midodrine  7. Hydronephrosis status post nephrostomy tube placement still some bloodstained discharge  8. + ANCA, patient has been having some proteinuria as an outpatient and during my last visit have ordered all the serologic work-up , which the patient has done prior to getting admitted to the hospital however the ANCA was not done at that time. She may be having some renal pathology going on , but I doubt it has anything to do with acute renal dysfunction during this admission. However she has received steroids. Once clinically stable might consider a renal biopsy. 9. Meds reviewed. Discussed with the patient and family    Dr. Yamila Jimenez MD, M,D.  [facility].

## 2021-11-22 NOTE — PROGRESS NOTES
[facility] ICU STEPDOWN TELEMETRY 4K  Occupational Therapy  Daily Note  Time:   Time In: 1026  Time Out: 1055  Timed Code Treatment Minutes: 29 Minutes  Minutes: 29        Co-tx with PT d/t medical complexity and requiring +2 skilled assist to tolerate therapy session. Date: 2021  Patient Name: Mohini Abad,   Gender: female      Room: [address] [place] [address]  MRN: [id number]  : 1950  (73 y.o.)  Referring Practitioner: Osito Jerry DO  Diagnosis: Acute respiratory failure  Additional Pertinent Hx: Per EMR:71F admitted [facility] w/ SOB. Current smoker w/ PMH PAH grp 3, HFpEF, stage III sacral ulcer s/p wound ostomy . Patient sister reports SpO2 51% at home and was brought to ED where She required emergent intubation and was transferred to ICU. Workup revealed left sided pleural effusion and underwent US guided thoracentesis. Pt required cardioversion on 11/3, CRRT -, extubated on 11/10, and bronch on  d/t left lung collapse; found to have left main stem mucous plug which was removed. Pt required reintubation on 11/15, trach placed ,    Restrictions/Precautions:  Restrictions/Precautions: General Precautions, Fall Risk  Position Activity Restriction  Other position/activity restrictions: trach to vent,rectal tube, NG, nephrostomy tube     SUBJECTIVE: Pt lying supine upon arrival with niece present, agreeable to OT session. PAIN: Denies    Vitals: Oxygen: Patient on PEEP 6, 30% FiO2 via [place] to [place]  upon arrival to room. Patient O2 sats at >90 %. Upon activity patient maintaining O2 at >90%. RT present to monitor vent settings. Heart Rate: WNL  Respiration Rate: 22-27    COGNITION: Slow Processing, Impaired Attention and Tangential. Pt would signal thumbs up/down responding to questions. ADL:   Grooming: Maximum Assistance. Pt's niece assisted with hair care during EOB sitting. Lower Extremity Dressing: Dependent.   Donning socks onto B feet.    BALANCE:  Sitting Balance:  Minimal Assistance, Maximum Assistance, X 1. And primarily SBA of another. Pt seated EOB ~9 minutes fluctuating from Holly progressing to MaxA x1 as pt fatigued- heavy posterior and L lateral lean requiring frequent cues to engage core to correct. BED MOBILITY:  Rolling to Left: Maximum Assistance, X 1    Rolling to Right: Maximum Assistance, X 1    Supine to Sit: Maximum Assistance, X 2    Sit to Supine: Maximum Assistance, X 2    Scooting: Maximum Assistance, X 2 EOB    TRANSFERS & FUNCTIONAL MOBILITY:  OTR to further assess. ADDITIONAL ACTIVITIES:  Pt participated in dynamic sitting activity to challenge balance and facilitate functional reach into various planes for target with BUE and 1 UE release from EOB. Pt demo's max difficulty with task requiring rest break between each trial, mod fatigue noted. Completed to increase safety with dynamic standing required for showering tasks. Completed BUE AROM supine exercises x5 reps x1 sets to increase strength and endurance required for ADLs. Pt required lengthy rest break between each exercise and min v/c for proper technique. ASSESSMENT:     Activity Tolerance:  Patient tolerance of  treatment: fair. Discharge Recommendations: Continue to assess pending progress, Patient would benefit from continued OT at discharge and Inpatient Therapy Stay  Equipment Recommendations: Equipment Needed: No  Other: defer to next level of care  Plan: Times per week: 5x  Current Treatment Recommendations: Strengthening, ROM, Balance Training, Functional Mobility Training, Endurance Training, Patient/Caregiver Education & Training, Equipment Evaluation, Education, & procurement, Self-Care / ADL    Patient Education  Patient Education: ADL's, Home Exercise Program, Importance of Increasing Activity and safety with bed mobility.     Goals  Short term goals  Time Frame for Short term goals: by discharge  Short term goal 1: Pt will

## 2021-11-22 NOTE — PROGRESS NOTES
Pt awake  Tracheostomy in place  CBI off, urine is concentrated yellow. Bloody sweat in left neph  On pressor  Denies pain  Wound culture with multiple bacteria    Vitals:    11/22/21 0121 11/22/21 0337 11/22/21 0419 11/22/21 0421   BP:  (!) 159/70     Pulse:  92     Resp:  18     Temp:  98.7 °F (37.1 °C)     TempSrc:  Oral     SpO2: 96% 97% 97%    Weight:    171 lb (77.6 kg)   Height:             Intake/Output Summary (Last 24 hours) at 11/22/2021 0852  Last data filed at 11/22/2021 0445  Gross per 24 hour   Intake 1969.37 ml   Output 285 ml   Net 1684.37 ml       Labs  Recent Labs     11/20/21  0455 11/21/21  0410 11/22/21  0340   WBC 8.7 7.2 5.8   HGB 8.8* 8.2* 8.4*   HCT 28.6* 26.9* 26.8*   * 116* 130   * 131* 129*   K 5.3* 3.5 3.7   CL 97* 96* 93*   CO2 20* 25 23   BUN 55* 31* 39*   CREATININE 3.1* 2.5* 3.0*   MG 2.0 1.9 1.9   PHOS 5.6* 4.1 4.1   CALCIUM 8.2* 8.2* 8.3*   Urine culture: No growth (11/2 and 10/27)  Left nephrostomy tube culture: Staph epidermis, MRSE. Very light growth. Exam  General: awake, nods yes and no    Heart: RRR. S1/S2. Abdomen: Soft. Distended. Left nephrostomy tube with bloody drainage. Hypoactive bowel sounds. :  Padilla with concentrated yellow urine. Extremities: UE and LE with 2+ pitting edema bilaterally      Assessment and Plan    1. ALVIN- Multifactorial (sepsis, left hydronephrosis, contrast). Nephrology following. CRT rising.     2. Gross Hematuria- resolved, off CBI Urine has remained clear. Call urology with any return of hematuria.     3. Pyelonephritis- Perinephric stranding on CT abdomen and pelvis with a small area concerning for developing abscess in the region of the left nephrostomy tube. Original and subsequent CT on 11/9/21 are stable. CT repeated 11/17/2021 shows no renal abscess.     4. Left Staghorn Calculus- Nephrostomy tube place 11/2/21. Will need treated when patient stable. Tube flushes without problems.  Discussed this with patient today.     5. Acute Blood Loss Anemia- HH stable     6. Respiratory Failure secondary to Pneumonia-  Tracheostomy insertion on 11/17/2021. Urology will continue to follow peripherally.   Contact us if hematuria returns    Electronically signed by LANE Quintanilla CNP on 11/22/2021 at 8:52 AM

## 2021-11-22 NOTE — PRE SEDATION
[facility]  Sedation Pre-Procedure Note    Patient Name: Lorelei Corado    YOB: 1950   Room/Bed: 95 [facility]  Medical Record Number: [id number]  Date: 11/22/2021  Time: 4:30 PM     Indication:  Pain control for Flexible Fibrooptic Bronchoscopy. Consent: I have discussed with the patient and/or the patient representative the indication, alternatives, and the possible risks and/or complications of the planned procedure and the anesthesia methods. The patient and/or patient representative appear to understand and agree to proceed.     Vital Signs: BP (!) 121/59   Pulse 87   Temp 97.9 °F (36.6 °C) (Oral)   Resp 16   Ht 4' 9\" (1.448 m)   Wt 171 lb (77.6 kg)   SpO2 100%   BMI 37.00 kg/m²       Past Medical History:  Past Medical History:   Diagnosis Date    CHF [facility]congestive heart failure) ([facility])     Dr. Nikolas Herrera    Depression     Gout attack     Hypertension     Osteoarthritis     Pulmonary artery hypertension ([facility] [address]     [address] [place] [address]-- Dr. Aurea Rhodes-- Dr. Rey Mccartney Renal calculi     Dr. Shawn Francisco Thrombocytopenia [facility])          Allergy:  No Known Allergies      Past Surgical History:  Past Surgical History:   Procedure Laterality Date    APPENDECTOMY  1960    FACIAL NERVE SURGERY      1989    IR NEPHROSTOMY PERCUTANEOUS LEFT  11/1/2021    IR NEPHROSTOMY PERCUTANEOUS LEFT 11/1/2021 Nasim Rodriguez MD [facility] SPECIAL PROCEDURES    PRESSURE ULCER DEBRIDEMENT Right 8/6/2021    EXCISION RIGHT BUTTOCK WOUND AND CLOSURE performed by Dyllan Mcgrath [facility] at [facility]       Medications:   Current Facility-Administered Medications   Medication Dose Route Frequency Provider Last Rate Last Admin    tobramycin (PF) (DORA) nebulizer solution 300 mg  300 mg Nebulization BID Marcelina Lin MD        LORazepam (ATIVAN) injection 1 mg  1 mg IntraVENous Q6H PRN Cindy Trejo MD   1 mg at 11/21/21 4102    lidocaine PF 4 % injection 4 mL  4 mL Inhalation 4x Daily PRN LANE Golden - CNP   4 mL at 11/21/21 0928    metoclopramide (REGLAN) injection 5 mg  5 mg IntraVENous Q8H Gray Shore RPH   5 mg at 11/22/21 1025    sodium chloride (Inhalant) 3 % nebulizer solution 4 mL  4 mL Nebulization Q6H Argentina Izaguirre MD   4 mL at 11/21/21 1710    And    albuterol (PROVENTIL) nebulizer solution 2.5 mg  2.5 mg Nebulization Q6H Argentina Izaguirre MD   2.5 mg at 11/22/21 1321    dextrose 10 % infusion   IntraVENous Continuous Zain MD Darius 30 mL/hr at 11/22/21 1159 New Bag at 11/22/21 1159    ondansetron (ZOFRAN) injection 4 mg  4 mg IntraVENous Q6H PRN Estela Gibbons DO   4 mg at 11/19/21 0956    lactobacillus (CULTURELLE) capsule 1 capsule  1 capsule Per NG tube Daily with breakfast Estela Gibbons DO   1 capsule at 11/22/21 1150    piperacillin-tazobactam (ZOSYN) 3,375 mg in dextrose 5 % 50 mL IVPB extended infusion (mini-bag)  3,375 mg IntraVENous Q12H Rad Anand DO 12.5 mL/hr at 11/22/21 1029 3,375 mg at 11/22/21 1029    chlorhexidine (PERIDEX) 0.12 % solution 15 mL  15 mL Mouth/Throat BID LANE Golden CNP   15 mL at 11/22/21 1024    ketamine (KETALAR) injection 100 mg  100 mg IntraVENous Once Estela Gibbons DO        lidocaine PF 1 % injection 5 mL  5 mL IntraDERmal Once LANE Golden CNP        insulin lispro (HUMALOG) injection vial 0-6 Units  0-6 Units SubCUTAneous Q6H LANE Morillo CNP        albuterol (PROVENTIL) nebulizer solution 2.5 mg  2.5 mg Nebulization Q4H PRN Cindy Trejo MD   2.5 mg at 11/13/21 0426    allopurinol (ZYLOPRIM) tablet 100 mg  100 mg Oral Daily Herlinda Cleaning DO   100 mg at 11/22/21 1150    phosphorus replacement protocol   Other RX Placeholder Jered Anand DO        fentaNYL (SUBLIMAZE) injection 25 mcg  25 mcg IntraVENous Q1H PRN Estela Gibbons DO   25 mcg at 11/21/21 0525    potassium bicarb-citric acid (EFFER-K) effervescent tablet 40 mEq  40 mEq Oral Once [employer], DO        glucose (GLUTOSE) 40 % oral gel 15 g  15 g Oral PRN Jonelle Anand DO        dextrose 50 % IV solution  12.5 g IntraVENous PRN [employer],         glucagon (rDNA) injection 1 mg  1 mg IntraMUSCular PRN Jonelle Anand DO        dextrose 5 % solution  100 mL/hr IntraVENous PRN [employer], DO        tiotropium-olodaterol (STIOLTO) 2.5-2.5 MCG/ACT inhaler 2 puff  2 puff Inhalation Daily Rad Anand DO   2 puff at 11/22/21 0921    polyethylene glycol (GLYCOLAX) packet 17 g  17 g Oral Daily PRN Jonelle Anand DO   17 g at 11/16/21 1704    [Held by provider] aspirin chewable tablet 81 mg  81 mg Oral Daily Cindy Trejo MD   81 mg at 11/07/21 0824    senna (SENOKOT) tablet 8.6 mg  1 tablet Oral BID PRN Jonelle Anand DO   8.6 mg at 11/09/21 0823    pantoprazole (PROTONIX) injection 40 mg  40 mg IntraVENous QAM Saul Preston APRN - CNP   40 mg at 11/22/21 1025    acetaminophen (TYLENOL) tablet 650 mg  650 mg Oral Q4H PRN Marylee Lower, MD   650 mg at 11/14/21 1322    sodium hypochlorite (DAKINS) 0.125 % external solution   Irrigation Daily Paloma Pavan V, DO   Given at 11/22/21 1159    0.9 % sodium chloride infusion  25 mL IntraVENous PRN Garrel Bee V, DO   Stopped at 11/17/21 0709    Riociguat TABS 2.5 mg  2.5 mg Oral Q8H Paloma Pavan V, DO   2.5 mg at 11/22/21 0601    sodium chloride flush 0.9 % injection 5-40 mL  5-40 mL IntraVENous 2 times per day Metzger Cutting, DO   10 mL at 11/22/21 1035    sodium chloride flush 0.9 % injection 5-40 mL  5-40 mL IntraVENous PRN Metzger Cutting, DO        FLUoxetine (PROZAC) capsule 40 mg  40 mg Oral Daily Cindy Trejo MD   40 mg at 11/22/21 1150    [Held by provider] metoprolol tartrate (LOPRESSOR) tablet 12.5 mg  12.5 mg Oral BID Navin Jones MD        [Held by provider] 0.9 % sodium chloride infusion   IntraVENous Continuous Cindy MD Neil 50 mL/hr at 10/28/21 2314 New Bag at 10/28/21 2314         Coumadin Use Last 7 Days:  no  Antiplatelet drug therapy use last 7 days: yes - Asprin. Other anticoagulant use last 7 days: Asprin. Pre-Sedation Documentation and Exam:   I have personally completed a history, physical exam & review of systems for this patient (see notes). Mallampati Airway Assessment:  Please see CRNA note for details. Prior History of Anesthesia Complications:   Please see CRNA note for details. ASA Classification:   Please see CRNA note for details. Sedation/ Anesthesia Plan:   Patient was given anesthesia by CRNA Felton from anesthesiology dept. Please see detailed note by CRNA for details.     Jazmin Brito MD MD, [facility]  11/22/2021, 4:30 PM    Electronically signed by Jazmin Brito MD on 11/22/2021 at 4:29 PM

## 2021-11-22 NOTE — CARE COORDINATION
Discharge Planning Update:    Transfer from ICU. Trach/vent. Planning bedside bronch today. Possible peg and tunneled cath placement later in week. [facility] updated and will accept once medically ready and bed available. No precert required.

## 2021-11-22 NOTE — PROGRESS NOTES
[address] [address]  INPATIENT PHYSICAL THERAPY  DAILY NOTE  [facility] ICU STEPDOWN TELEMETRY 4K - 4K-12/012-A    Time In: 1026  Time Out: 1055  Timed Code Treatment Minutes: 29 Minutes  Minutes: 29   Co-Tx with OT d/t medical complexity and patient requiring +2 skilled assist to tolerate therapy session. Date: 2021  Patient Name: Adriane Wall,  Gender:  female        MRN: [id number]  : 1950  (73 y.o.)     Referring Practitioner: Corona Nugent DO  Diagnosis: acute respiratory failure  Additional Pertinent Hx: admit with above diagnosis,71F admitted to [address] [place] [address] 10/27/21 w/ SOB. Current smoker w/ PMH PAH grp 3, HFpEF, stage III sacral ulcer s/p wound ostomy . Patient sister reports SpO2 51% at home and was brought to ED where ABG showed pH 7.19, PCO2 80, PO2 134. She required emergent intubation and was transferred to ICU. Workup revealed left sided pleural effusion and underwent US guided thoracentesis w/ 1100 cc removed consistent w/ MRSA/adenovirus. Patient was noted to be in afib/RVR and was placed on amio, heparin drip. Patient was also noted to have normocytic anemia and received 1 unit PRBC 10/31/21. CT abdomen revealed CBD dilation w/ cholelithiasis. s/p trach placement 21     Prior Level of Function:  Lives With: Family (brother)  Type of Home: House  Home Layout: One level  Home Equipment: Rolling walker, [employer]   Bathroom Equipment: Shower chair    ADL Assistance: Independent  Homemaking Assistance: Needs assistance (family completes all)  Ambulation Assistance: Independent  Transfer Assistance: Independent  Additional Comments: Brother reported pt has been using RW since  when had surgery/wound vac for sacral decubitus. Brother reported prior to surgery pt was walking 3,000 steps/day, although since surgery does minimal activity.     Restrictions/Precautions:  Restrictions/Precautions: General Precautions, Fall Risk  Position Activity Restriction  Other position/activity restrictions: trach to vent,rectal tube, NG, nephrostomy tube     SUBJECTIVE: RN approved session. Patient in bed upon arrival, agreed to PT session. Niece in room during treatment. PAIN: No pain noted. Vitals: Oxygen: Patient on PEEP 6, 30% FiO2 via Veent to Selestine Beams upon arrival to room. Patient 02 sats at >90%. Upon activity patient maintaining 02 at >90%. RT present to monitor vent settings. Heart Rate: WNL  22-27    OBJECTIVE:  Bed Mobility:  Rolling to Left: Maximum Assistance, X 1   Rolling to Right: Maximum Assistance, X 1   Supine to Sit: Maximum Assistance, X 2  Sit to Supine: Maximum Assistance, X 2   Scooting: Maximum Assistance, X 2, to the EOB    Balance:  Minimal Assistance, Maximum Assistance, X 1, and SBA of another. Fluctuated between Min-Max. A throughout, demonstrating heavy left/posterior lean but was able to correct with Min. A at times. Sat EOB for ~9 minutes with completing dynamic reaching and seated there-ex to facilitate core musculature. Fatigued by the end of time, requesting to lay back down. Exercise:  Patient was guided in 1 set(s) 5 reps of exercise to both lower extremities. Heelslides. Also completed passive heel cord stretch 3x30 seconds bilaterally. Exercises were completed for increased independence with functional mobility. Functional Outcome Measures: Not completed       ASSESSMENT:  Assessment: Patient progressing toward established goals. The patient tolerated session well and is showing steady progress towards goals at this time. Still requires +2 skilled assist to safely complete and tolerate session. Was able to progress to only needing Min. A at times when sitting EOB but still mostly requiring Mod-Max. A x1 to maintain sitting balance. Would benefit from additional skilled PT to increase strength ,endurance, balance and safety for improved functional mobility. Activity Tolerance:  Patient tolerance of  treatment: good.       Equipment Recommendations: Other: cont to assess needs  Discharge Recommendations: Continue to assess pending progress  Plan: Times per week: 3-5X GM  Times per day: Daily  Specific instructions for Next Treatment: therex, bed mobility, sitting balance, PT to assess transfers    Patient Education  Patient Education: Plan of Care, Precautions/Restrictions, Bed Mobility, Reviewed Prior Education, Health Promotion and Wellness Education, - Patient Requires Continued Education    Goals:  Patient goals : not stated  Short term goals  Time Frame for Short term goals: by discharge  Short term goal 1: Roll L/R with modA for pressure relief  Short term goal 2: sidelying to/from sit with modAx2 to get in/out of bed  Short term goal 3: tolerate >15 min sitting balance activity with </=CGA for balance to demonstrate inc strength/endurance for progression to transfers  Short term goal 4: PT to assess transfers/gait  Long term goals  Time Frame for Long term goals : no LTGs set secondary to short ELOS    Following session, patient left in safe position with all fall risk precautions in place.

## 2021-11-22 NOTE — CONSULTS
[facility]      Patient - Tamera Louise   MRN -  [id number]   [facility] # - [de-identified]   - 1950      Date of Admission -  10/27/2021  1:36 PM  Date of evaluation -  2021  Room - -Formerly named [facility]   Hospital Day - 32  Consulting - Jun Salinas MD Primary Care Physician - Dhaval Ramos MD     Problem List      Active Hospital Problems    Diagnosis Date Noted    Paroxysmal atrial fibrillation ([address] [I48.0] 2021    Aspiration pneumonia of left lung ([address] [J69.0]     Pleural effusion [J90]     Shock ([address] [R57.9]     Flash pulmonary edema ([address] [J81.0]     Acute respiratory failure ([address] [J96.00] 10/27/2021    Pressure ulcer of right buttock, stage 3 ([address] [L89.313] 2021    Pulmonary HTN ([address] [I27.20] 2019     Reason for Consult    Ventilator, hemoptysis   HPI   History Obtained From: Patient's niece at bedside and electronic medical record. Tamera Louise is a 73 y.o. female never smoker with PMHx chronic diastolic CHF (EF 30-73%, Z8LB per TTE 21) / NYHA II / chronic RV failure / severe PAH (PA 85/31 - mean 52 / PVR 10), mild AS (valve area 1.7 sq cm), morbid obesity, HTN and depression -- presents to [facility] with a chief complaint of shortness of breath. History obtained from the EMR (patient intubated / sedated on the ventilator).     Patient presented to [facility] ED after her sister noticed the patient was experiencing worsening shortness of breath throughout the day / recorded SpO2 51% on home pulse oximeter prior to arrival. The patient has a known history of CHF and PAH, for which she follows with Dr. Ayla Chaney (cardiology) and [facility] [address]4 / currently on adempas. Per report, she was recently discontinued from oral bumex, which she stopped taking just this morning.  She endorsed mild headache on arrival (poorly characterized), but no associated fevers/chills, chest pain, palpitations, cough, n/v/d, abdominal pain, urinary symptoms, 5 mL IntraDERmal Once    insulin lispro  0-6 Units SubCUTAneous Q6H    allopurinol  100 mg Oral Daily    phosphorus replacement protocol   Other RX Placeholder    potassium bicarb-citric acid  40 mEq Oral Once    tiotropium-olodaterol  2 puff Inhalation Daily    [Held by provider] aspirin  81 mg Oral Daily    pantoprazole  40 mg IntraVENous QAM    sodium hypochlorite   Irrigation Daily    Riociguat  2.5 mg Oral Q8H    sodium chloride flush  5-40 mL IntraVENous 2 times per day    FLUoxetine  40 mg Oral Daily    [Held by provider] metoprolol tartrate  12.5 mg Oral BID     LORazepam, lidocaine PF, sodium chloride, ondansetron, albuterol, fentanNYL, glucose, dextrose, glucagon (rDNA), dextrose, polyethylene glycol, senna, acetaminophen, sodium chloride, sodium chloride flush  IV Drips/Infusions   dextrose 30 mL/hr at 11/22/21 0445    sodium chloride      midazolam      fentaNYL 500 mcg in sodium chloride 0.9% 100 ml infusion Stopped (11/18/21 0100)    phenylephrine (KRISTAL-SYNEPHRINE) 50mg/250mL infusion Stopped (11/19/21 1438)    dextrose      dexmedetomidine Stopped (11/20/21 1653)    sodium chloride Stopped (11/17/21 0709)    [Held by provider] sodium chloride 50 mL/hr at 10/28/21 2314     Home Medications  Medications Prior to Admission: metoprolol tartrate (LOPRESSOR) 25 MG tablet, Take 0.5 tablets by mouth 2 times daily  sodium hypochlorite (DAKINS) 0.125 % SOLN external solution, Apply Dakin's moistened gauze dressings to wound twice daily and as needed.   sodium chloride 1 g tablet, Take 1 tablet by mouth 2 times daily  allopurinol (ZYLOPRIM) 300 MG tablet, Take 1 tablet by mouth daily  FLUoxetine (PROZAC) 40 MG capsule, Take 1 capsule by mouth daily  potassium chloride (KLOR-CON M) 10 MEQ extended release tablet, Take 1 tablet by mouth every other day  bumetanide (BUMEX) 1 MG tablet, Take 1 tablet by mouth daily  Multiple Vitamins-Minerals (MULTIVITAMIN WOMEN PO), Take 1 tablet by mouth daily  acetaminophen (TYLENOL) 650 MG extended release tablet, Take 650 mg by mouth every 8 hours as needed for Pain  Riociguat (ADEMPAS) 2.5 MG TABS, Take 2.5 mg by mouth 3 times daily  docusate sodium (COLACE) 100 MG capsule, Take 100 mg by mouth as needed   aspirin 81 MG tablet, Take 81 mg by mouth daily   Diet    Diet NPO  ADULT TUBE FEEDING; Orogastric; Renal Formula; Continuous; 10; Yes; 10; Q 8 hours; 30; 30; Q 4 hours; Protein; 1 ( 2.5 oz) liquid protein daily  Allergies    Patient has no known allergies. Social History     Social History     Socioeconomic History    Marital status: Single     Spouse name: Not on file    Number of children: 0    Years of education: Not on file    Highest education level: Not on file   Occupational History    Not on file   Tobacco Use    Smoking status: Never Smoker    Smokeless tobacco: Never Used   Vaping Use    Vaping Use: Never used   Substance and Sexual Activity    Alcohol use: No    Drug use: No    Sexual activity: Not Currently   Other Topics Concern    Not on file   Social History Narrative    Not on file     Social Determinants of Health     Financial Resource Strain: Low Risk     Difficulty of Paying Living Expenses: Not very hard   Food Insecurity: No Food Insecurity    Worried About Running Out of Food in the Last Year: Never true    Traci of Food in the Last Year: Never true   Transportation Needs:     Lack of Transportation (Medical): Not on file    Lack of Transportation (Non-Medical):  Not on file   Physical Activity:     Days of Exercise per Week: Not on file    Minutes of Exercise per Session: Not on file   Stress:     Feeling of Stress : Not on file   Social Connections:     Frequency of Communication with Friends and Family: Not on file    Frequency of Social Gatherings with Friends and Family: Not on file    Attends [employer] Services: Not on file    Active Member of Clubs or Organizations: Not on file    Attends Club or Organization Meetings: Not on file    Marital Status: Not on file   Intimate Partner Violence:     Fear of Current or Ex-Partner: Not on file    Emotionally Abused: Not on file    Physically Abused: Not on file    Sexually Abused: Not on file   Housing Stability:     Unable to Pay for Housing in the Last Year: Not on file    Number of Places Lived in the Last Year: Not on file    Unstable Housing in the Last Year: Not on file     Family History          Problem Relation Age of Onset    Diabetes Father    Parrish Saliva Arthritis Mother     COPD Mother     Diabetes Sister     Heart Disease Maternal Uncle     Breast Cancer Niece 36    Sleep Apnea Brother     Asthma Neg Hx     Birth Defects Neg Hx     Cancer Neg Hx     Depression Neg Hx     Early Death Neg Hx     Hearing Loss Neg Hx     High Blood Pressure Neg Hx     High Cholesterol Neg Hx     Kidney Disease Neg Hx     Learning Disabilities Neg Hx     Mental Illness Neg Hx     Mental Retardation Neg Hx     Miscarriages / Stillbirths Neg Hx     Stroke Neg Hx     Substance Abuse Neg Hx     Vision Loss Neg Hx     Other Neg Hx      Sleep History    Never diagnosed with sleep apnea in the past.  Occupational history   Occupation:  She is current working: No  Type of profession: unemployed, disabled.                      History of tobacco smoking:No     History of recreational or IV drug use in the past:NO     History of exposure to coal mines/coal dust: NO  History of exposure to foundry dust/welding: NO  History of exposure to quarry/silica/sandblasting: NO  History of exposure to asbestos/working with breaks/ships: NO  History of exposure to farm dust: NO  History of recent travel to long distances: NO  History of exposure to birds, pigeons, or chickens in the past:NO    History of pulmonary embolism in the past: No            History of DVT in the past:No            Review of systems   Review of Systems   Unable to perform ROS: Patient nonverbal   due to tracheostomy tube. Vitals     height is 4' 9\" (1.448 m) and weight is 171 lb (77.6 kg). Her oral temperature is 98.5 °F (36.9 °C). Her blood pressure is 128/60 and her pulse is 90. Her respiration is 20 and oxygen saturation is 95%. Body mass index is 37 kg/m². SUPPLEMENTAL O2: O2 Flow Rate (L/min): 10 L/min     I/O        Intake/Output Summary (Last 24 hours) at 11/22/2021 1014  Last data filed at 11/22/2021 0445  Gross per 24 hour   Intake 1969.37 ml   Output 285 ml   Net 1684.37 ml     I/O last 3 completed shifts: In: 1969.4 [I.V.:960.5; NG/GT:638; IV Piggyback:370.9]  Out: 285 [Urine:160; Stool:125]   Patient Vitals for the past 96 hrs (Last 3 readings):   Weight   11/22/21 0421 171 lb (77.6 kg)   11/20/21 1625 174 lb 6.1 oz (79.1 kg)       Exam   Nursing note and vitals reviewed. Exam:  /60   Pulse 90   Temp 98.5 °F (36.9 °C) (Oral)   Resp 20   Ht 4' 9\" (1.448 m)   Wt 171 lb (77.6 kg)   SpO2 95%   BMI 37.00 kg/m²   General appearance: No apparent distress, appears stated age and cooperative. Chronically ill appearing female. Tracheostomy tube present placed on 11/17/21. HEENT: Pupils equal, round, and reactive to light. Conjunctivae/corneas clear. Neck: Supple, with limited range of motion. No jugular venous distention. Trachea midline. Tracheostomy tube present placed on 11/17/21. Respiratory:  Normal respiratory effort. Clear to auscultation, bilaterally without Rales/Wheezes. Rhonchus throughout bilateral lung fields. Cardiovascular: Regular rate and rhythm with normal S1/S2 without murmurs, rubs or gallops. Abdomen: Soft, non-tender, non-distended with normal bowel sounds. Musculoskeletal: passive and active ROM x 4 extremities. Skin: Skin color, texture, turgor normal.  No rashes or lesions. Bilateral nonpitting lower extremity edema. Rectal tube and seaman catheter present. Neurologic:  Neurovascularly intact without any focal sensory/motor deficits.  Grossly non-focal.  Psychiatric: Alert and oriented, responsive with head nods   Capillary Refill: Brisk,< 3 seconds   Peripheral Pulses: +2 palpable, equal bilaterally       Labs  - Old records and notes have been reviewed in [facility]   ABG  Lab Results   Component Value Date    PH 7.36 11/13/2021    PO2 59 11/13/2021    PCO2 45 11/13/2021    HCO3 26 11/13/2021    O2SAT 89 11/13/2021     Lab Results   Component Value Date    IFIO2 70 11/13/2021    MODE PC 11/05/2021    SETPEEP 8.0 11/15/2021     CBC  Recent Labs     11/20/21 0455 11/21/21 0410 11/22/21  0340   WBC 8.7 7.2 5.8   RBC 3.00* 2.78* 2.83*   HGB 8.8* 8.2* 8.4*   HCT 28.6* 26.9* 26.8*   MCV 95.3 96.8 94.7   MCH 29.3 29.5 29.7   MCHC 30.8* 30.5* 31.3*   * 116* 130   MPV 10.1 9.6 9.2*      BMP  Recent Labs     11/20/21 0455 11/21/21  0410 11/22/21  0340   * 131* 129*   K 5.3* 3.5 3.7   CL 97* 96* 93*   CO2 20* 25 23   BUN 55* 31* 39*   CREATININE 3.1* 2.5* 3.0*   GLUCOSE 98 113* 104   MG 2.0 1.9 1.9   PHOS 5.6* 4.1 4.1   CALCIUM 8.2* 8.2* 8.3*     LFT  Recent Labs     11/20/21 0455 11/21/21  0410 11/22/21  0340   AST 16 13 12   ALT 15 14 13   BILITOT 0.8 0.4 0.4   ALKPHOS 117 107 106     TROP  Lab Results   Component Value Date    TROPONINT 0.084 10/28/2021    TROPONINT 0.059 10/27/2021    TROPONINT 0.037 03/06/2020     BNP  No results for input(s): BNP in the last 72 hours. Lactic Acid  No results for input(s): LACTA in the last 72 hours. INR  No results for input(s): INR, PROTIME in the last 72 hours. PTT  No results for input(s): APTT in the last 72 hours. Glucose  Recent Labs     11/21/21  1755 11/21/21  2336 11/22/21  0513   POCGLU 114* 113* 119*     UA No results for input(s): SPECGRAV, PHUR, COLORU, CLARITYU, MUCUS, PROTEINU, BLOODU, RBCUA, WBCUA, BACTERIA, NITRU, GLUCOSEU, BILIRUBINUR, UROBILINOGEN, KETUA, LABCAST, LABCASTTY, AMORPHOS in the last 72 hours. Invalid input(s): CRYSTALS.     PFTs           Sleep studies   Study Results  Initial Study Date -  11/7/19  AHI -  5.8   TotalEvents - 32  (Apneas  19  Hypopneas 13  Central  0)  LM w/Arousals - 0  Sleep Efficiency - 70.8 % (Total Sleep Time - 330 min)  Time with Sats below 88% - 0 min    Cultures    -Anaerobic and aerobic culture: Positive for Proteus mirabilis and Pseudomonas aeruginosa, culture also yielded very light growth of Staphylococcus species (coagulase negative), and additional enteric gram negative bacilli. At least one of drugs in current antibiotic therapy is ineffective in vitro for isolate. (11/19/21)  -Respiratory culture on 11/11/21 of bronchial washing: Few segmented neutrophils observed. Few epithelial cells observed. No bacteria seen. -Molecular pneumonia panel of sputum on 11/15/2021: Negative  -AFB culture with smear: negative  -Molecular pneumonia panel of bronchial washings on 11/13/2021: Positive for staph aureus and resistant gene meca/c & mrej  -Body fluid culture on 11/5/2021: positive for Staph epidermidis, very light growth Isolates of Methicillin Resistant Staphylococcus coagulase negative (MRSE) do NOT require CONTACT isolation. Methicillin(Oxacillin)resistant strains of staphylococci (MRSA)or(MRSE)should be considered resistant to all classes of cephalosporins, penems and beta-lactams.   -Respiratory culture on 11/5/2021 of esophageal breast specimen: Positive for staphylococcal aureus, many segmented neutrophils observed. Rare epithelial cells observed. Many gram positive cocci in pairs and clusters. EKG     Echocardiogram   Complete 2D ECHO with doppler with color 10/27/21:  Mitral Valve   The mitral valve structure was normal with normal leaflet separation. DOPPLER: The transmitral velocity was within the normal range with no   evidence for mitral stenosis. There was no evidence of mitral   regurgitation. Aortic Valve   The aortic valve was trileaflet with normal thickness and cuspal   separation.  DOPPLER: Transaortic velocity was within the normal range with   no evidence of aortic stenosis. There was no evidence of aortic   regurgitation. Tricuspid Valve   The tricuspid valve structure was normal with normal leaflet separation. DOPPLER: There was no evidence of tricuspid stenosis. There was trace   tricuspid regurgitation. Pulmonic Valve   The pulmonic valve leaflets exhibited normal thickness, no calcification,   and normal cuspal separation. DOPPLER: The transpulmonic velocity was   within the normal range with no evidence for regurgitation. Left Atrium   Left atrial size was normal.      Left Ventricle   Normal left ventricular size and systolic function. There were no regional wall motion abnormalities. Wall thickness was within normal limits. Ejection fraction was estimated at 60-65%. Right Atrium   Right atrial size was normal.      Right Ventricle   The right ventricular size was normal with normal systolic function and   wall thickness. Pericardial Effusion   The pericardium was normal in appearance with a small, non-hemodynamically   significant pericardial effusion. Prominent pericardial fat pad. Pleural Effusion   No evidence of pleural effusion. Aorta / Great Vessels   IVC size is dilated with reduced respiratory phasic changes (CVP~10-15   mmHg). Radiology    CXR  PORT CXR 11/19/21:  There is a Dobbhoff tube which projects over the stomach. There is a left-sided percutaneous nephrostomy tube, stable compared to prior CT. 1. Mild hepatomegaly. Left subclavian dialysis catheter with tip at cavoatrial junction. NG tube passes into stomach. Tracheostomy tube in good position. Right jugular line with catheter tip in right atrium. 2. Tiny bilateral pleural effusions. Moderate pneumonia/pulmonary edema scattered relatively diffusely in both lungs.   3. Overall appearance of chest has worsened somewhat since prior.       CT Scans  (See actual reports for details)    CT chest without contrast on 11/1/21: There are moderate bilateral pleural effusions which have mildly increased in size in the interval. There is adjacent atelectasis. Pulmonary vessels are prominent, similar to prior exam. There are groundglass opacities adjacent to the pulmonary vessels. The heart is enlarged, similar to prior exam. There is a small pericardial effusion, decreased compared to prior exam. Redemonstration of percutaneous cardiac pacer leads and enteric tube. The endotracheal tube is stable with tip in the distal trachea. There is marked thyromegaly which is nodular in appearance, stable compared to prior exam. Visualized upper abdominal solid organs are grossly unremarkable. There are stable degenerative changes of the thoracic spine with exaggerated thoracic kyphosis. Assessment   Hemoptysis in the setting of tracheostomy tube   Acute hypoxic respiratory failure secondary to severe multi-organism pneumonia with bilateral pleural effusion and repeated mucous plugging   Intubated 10/27/21, extubated 11/9/21, reintubated 11/15/21, tracheostomy tube placed 11/17/21  S/p bronchoscopy with mucous plug removal from the left mainstem on 11/13/21 by Dr. Ernestine Lopez in ICU  Bilateral pleural effusions   Severe bilateral multiorganism pneumonia: received 14 days of vancomycin, 5 days of rocephin, clinidamycin for 8 days   ANCA associated vasculitis: elevated anti MPO elevated 416 and serine proteinase 3 IgG WNL  Stage II ALVIN on CKD 3 on HD started 11/20/21  Hydronephrosis 2/2 Left-sided staghorn calculi  Acute blood loss anemia: PRBC x1 transfused 10/31/21, 11/4/21, 11/5/21, x2 11/6/21, x3 11/8/21, x3 11/15/21. Heparin stopped (dialysis dose last ran on 11/11/21).   Thrombocytopenia   Hematuria   BARBER noncompliant with CPAP     Plan   Plan for bedside bronchoscopy without fluoroscopy for airway examination and to obtain bronchial washings today   Hold all anticoagulation medications    Diet NPO   Aspiration precautions

## 2021-11-22 NOTE — PROGRESS NOTES
1615  Pt. Responds slightly to name on adm. To pacu. Pt. On ventilator as preop. 1622  Pt. Nodding head appropriately to answer questions. Pt. Denies pain. 1640  Report called to 4K.  1700  pacu criteria met. Transfer to 4K22.

## 2021-11-22 NOTE — FLOWSHEET NOTE
11/22/21 0739   Provider Notification   Reason for Communication Evaluate  (Pulmonolgy consult. )   Provider Name Dr. Shabana Villasenor   Provider Notification Physician   Method of Communication Secure Message   Response Waiting for response   [id number]: RN secured message Dr. Shabana Villasenor in regards of pulmonology consult for ventilator and hemoptysis. 0740: Dr. Shabana Villasenor sent message to Dr. Carmen Peralta. 1230: RN secured message Dr. Magalis Gutierrez with pulmonology in regards to ID consult and Dr. Amelie Barriga not accepting patient at this time and to inform pulmonology to reach out to Dr. Petra Elena for consult for antibiotics. 1302: Dr. Magalis Gutierrez aware.

## 2021-11-22 NOTE — PROGRESS NOTES
Photos taken, scope used Bronch SER#001. Mucous plugs removed. No specimens obtained, Bronchoscopy completed, pt tolerated well.

## 2021-11-22 NOTE — PROGRESS NOTES
Gastroenterology Progress Note:     Patient Name:  Velvet Massey   MRN: [id number]  [id number]  YOB: 1950  Admit Date: 10/27/2021  1:36 PM  Primary Care Physician: Adeline Erazo MD   [id number]     Patient seen and examined. 24 hours events and chart reviewed. Subjective: Patient resting in bed, niece present. She denies abdominal pain, nausea, & vomiting. Her niece states she still seems a little \"urpy. \" Per RN, she is tolerating TF via Dobhoff tube & is at goal. Per RN, she had blood noted during suctioning & pulmonology is planning to do a bronch today. Creatinine worse today, nephrology planning possible dialysis. Objective:  /60   Pulse 90   Temp 98.5 °F (36.9 °C) (Oral)   Resp 20   Ht 4' 9\" (1.448 m)   Wt 171 lb (77.6 kg)   SpO2 95%   BMI 37.00 kg/m²     Physical Exam:    General:  Acute on chronically ill appearing female  HEENT: Atraumatic, normocephalic. Dry oral mucous membranes. Dobhoff tube in place. Neck: Supple without adenopathy, JVD, thyromegaly or masses. Trachea midline. Tracheostomy intact. CV: Heart RRR, no murmurs, rubs, gallops. Resp: Even, easy without cough or accessory use. Rhonchi throughout bilaterally. Abd: Round, soft, obese, nontender. No hepatosplenomegaly or mass present. Active bowel sounds heard. No distention noted. Ext:  Without cyanosis, clubbing. BUE & BLE edema. Skin: Pink, warm, dry  Neuro:  Alert, oriented x 3 with no obvious deficits.        Rectal: deferred    Labs:   CBC:   Lab Results   Component Value Date    WBC 5.8 11/22/2021    HGB 8.4 11/22/2021    HCT 26.8 11/22/2021    MCV 94.7 11/22/2021     11/22/2021     BMP:   Lab Results   Component Value Date     11/22/2021    K 3.7 11/22/2021    K 4.8 11/17/2021    CL 93 11/22/2021    CO2 23 11/22/2021    PHOS 4.1 11/22/2021    BUN 39 11/22/2021    CREATININE 3.0 11/22/2021    CALCIUM 8.3 11/22/2021     PT/INR:   Lab Results   Component Value Date    INR 1.02 11/18/2021     Lipids:   Lab Results   Component Value Date    ALKPHOS 106 11/22/2021    ALT 13 11/22/2021    AST 12 11/22/2021    BILITOT 0.4 11/22/2021    BILIDIR 0.3 03/06/2020    LABALBU 2.4 11/22/2021     Current Meds:  Scheduled Meds:   metoclopramide  5 mg IntraVENous Q8H    sodium chloride (Inhalant)  4 mL Nebulization Q6H    And    albuterol  2.5 mg Nebulization Q6H    lactobacillus  1 capsule Per NG tube Daily with breakfast    piperacillin-tazobactam  3,375 mg IntraVENous Q12H    chlorhexidine  15 mL Mouth/Throat BID    ketamine  100 mg IntraVENous Once    lidocaine 1 % injection  5 mL IntraDERmal Once    insulin lispro  0-6 Units SubCUTAneous Q6H    allopurinol  100 mg Oral Daily    phosphorus replacement protocol   Other RX Placeholder    potassium bicarb-citric acid  40 mEq Oral Once    tiotropium-olodaterol  2 puff Inhalation Daily    [Held by provider] aspirin  81 mg Oral Daily    pantoprazole  40 mg IntraVENous QAM    sodium hypochlorite   Irrigation Daily    Riociguat  2.5 mg Oral Q8H    sodium chloride flush  5-40 mL IntraVENous 2 times per day    FLUoxetine  40 mg Oral Daily    [Held by provider] metoprolol tartrate  12.5 mg Oral BID     Continuous Infusions:   dextrose 30 mL/hr at 11/22/21 0445    sodium chloride      dextrose      sodium chloride Stopped (11/17/21 0709)    [Held by provider] sodium chloride 50 mL/hr at 10/28/21 2314       Assessment:  72 yo F, GI consulted for PEG tube placement. Admitted 10/27/21 for acute hypoxic hypercapnic respiratory failure 2/2 pneumonia. Tracheostomy placement 11/17. S/P bronch with mucous plug removal 11/15. Developed septic shock. Has stage IV decubitus ulcer with suspected osteomyelitis, GenSurg consulted without indications for debridement 11/17, on Vancomycin and Zosyn. Noted to have ALVIN on CKD3, has required dialysis on and off.  We saw on 10/31 for concern of dilated CBD without evidence of choledocholithiasis, rec consider HIDA once stable. Reconsult 11/19/21 for consideration of PEG placement as no longer tolerating NGT which was removed and Dobbhoff placed 11/19 with continued N/V with any use of tube. Lidocaine aerosol has helped.       1. Acute respiratory failure S/P tracheostomy placement 11/17/21  2. Malnutrition  3. N/V  4. Pneumonia  5. Septic Shock- resolved  6. Stage IV decubitus ulcer with suspected osteomyelitis, not a surgical candidate- on ATBs  7. ALVIN on CKD3- s/p CRRT and intermittent CVVH which was held 11/15  8. ANCA associated vasculitis  9. Hydronephrosis  10. Anemia- without s/s of active GI bleeding- stable  11. Thrombocytopenia- resolved  12. Cholelithiasis  13. Chronic cigarette nicotine dependence  14.  Hemoptysis noted with suctioning 11/22/21       Plan:     Monitor H & H, transfuse prn   TF per dietary recs   Continue Reglan   Nursing to monitor stool output & document   ATBs per primary   Bronch today per pulm   Continue to hold anticoagulation    Will plan for PEG placement later this week pending bronch results & kidney function   Nephrology, urology, & pulmonology on board   RN updated   Supportive care per primary team  Will follow    Case reviewed and impression/plan reviewed in collaboration with Dr. Joyce Landin  Electronically signed by LANE Newman - CNP on 11/22/2021 at 11:47 AM    [facility]

## 2021-11-23 LAB
AEROBIC CULTURE: ABNORMAL
ALBUMIN SERPL-MCNC: 2.7 G/DL (ref 3.5–5.1)
ALP BLD-CCNC: 115 U/L (ref 38–126)
ALT SERPL-CCNC: 13 U/L (ref 11–66)
ANAEROBIC CULTURE: ABNORMAL
ANION GAP SERPL CALCULATED.3IONS-SCNC: 15 MEQ/L (ref 8–16)
AST SERPL-CCNC: 11 U/L (ref 5–40)
BASOPHILS # BLD: 0.2 %
BASOPHILS ABSOLUTE: 0 THOU/MM3 (ref 0–0.1)
BILIRUB SERPL-MCNC: 0.3 MG/DL (ref 0.3–1.2)
BUN BLDV-MCNC: 54 MG/DL (ref 7–22)
CALCIUM IONIZED: 1.19 MMOL/L (ref 1.12–1.32)
CALCIUM SERPL-MCNC: 8.5 MG/DL (ref 8.5–10.5)
CHLORIDE BLD-SCNC: 88 MEQ/L (ref 98–111)
CO2: 21 MEQ/L (ref 23–33)
CREAT SERPL-MCNC: 3.5 MG/DL (ref 0.4–1.2)
EOSINOPHIL # BLD: 0 %
EOSINOPHILS ABSOLUTE: 0 THOU/MM3 (ref 0–0.4)
ERYTHROCYTE [DISTWIDTH] IN BLOOD BY AUTOMATED COUNT: 16 % (ref 11.5–14.5)
ERYTHROCYTE [DISTWIDTH] IN BLOOD BY AUTOMATED COUNT: 53.8 FL (ref 35–45)
GFR SERPL CREATININE-BSD FRML MDRD: 13 ML/MIN/1.73M2
GLUCOSE BLD-MCNC: 139 MG/DL (ref 70–108)
GLUCOSE BLD-MCNC: 153 MG/DL (ref 70–108)
GLUCOSE BLD-MCNC: 166 MG/DL (ref 70–108)
GLUCOSE BLD-MCNC: 98 MG/DL (ref 70–108)
GRAM STAIN RESULT: ABNORMAL
HCT VFR BLD CALC: 27.6 % (ref 37–47)
HEMOGLOBIN: 8.7 GM/DL (ref 12–16)
IMMATURE GRANS (ABS): 0.02 THOU/MM3 (ref 0–0.07)
IMMATURE GRANULOCYTES: 0.4 %
LYMPHOCYTES # BLD: 3.3 %
LYMPHOCYTES ABSOLUTE: 0.2 THOU/MM3 (ref 1–4.8)
MAGNESIUM: 1.8 MG/DL (ref 1.6–2.4)
MCH RBC QN AUTO: 29.3 PG (ref 26–33)
MCHC RBC AUTO-ENTMCNC: 31.5 GM/DL (ref 32.2–35.5)
MCV RBC AUTO: 92.9 FL (ref 81–99)
MONOCYTES # BLD: 2.3 %
MONOCYTES ABSOLUTE: 0.1 THOU/MM3 (ref 0.4–1.3)
NUCLEATED RED BLOOD CELLS: 0 /100 WBC
ORGANISM: ABNORMAL
ORGANISM: ABNORMAL
PHOSPHORUS: 4.3 MG/DL (ref 2.4–4.7)
PLATELET # BLD: 159 THOU/MM3 (ref 130–400)
PMV BLD AUTO: 9.8 FL (ref 9.4–12.4)
POTASSIUM SERPL-SCNC: 4 MEQ/L (ref 3.5–5.2)
RBC # BLD: 2.97 MILL/MM3 (ref 4.2–5.4)
SEG NEUTROPHILS: 93.8 %
SEGMENTED NEUTROPHILS ABSOLUTE COUNT: 4.5 THOU/MM3 (ref 1.8–7.7)
SODIUM BLD-SCNC: 124 MEQ/L (ref 135–145)
TOTAL PROTEIN: 5.2 G/DL (ref 6.1–8)
WBC # BLD: 4.8 THOU/MM3 (ref 4.8–10.8)

## 2021-11-23 PROCEDURE — 85025 COMPLETE CBC W/AUTO DIFF WBC: CPT

## 2021-11-23 PROCEDURE — 99232 SBSQ HOSP IP/OBS MODERATE 35: CPT | Performed by: INTERNAL MEDICINE

## 2021-11-23 PROCEDURE — 2580000003 HC RX 258: Performed by: INTERNAL MEDICINE

## 2021-11-23 PROCEDURE — 2060000000 HC ICU INTERMEDIATE R&B

## 2021-11-23 PROCEDURE — 6360000002 HC RX W HCPCS: Performed by: INTERNAL MEDICINE

## 2021-11-23 PROCEDURE — 36415 COLL VENOUS BLD VENIPUNCTURE: CPT

## 2021-11-23 PROCEDURE — 94640 AIRWAY INHALATION TREATMENT: CPT

## 2021-11-23 PROCEDURE — 82330 ASSAY OF CALCIUM: CPT

## 2021-11-23 PROCEDURE — 6360000002 HC RX W HCPCS: Performed by: PHARMACIST

## 2021-11-23 PROCEDURE — 6370000000 HC RX 637 (ALT 250 FOR IP): Performed by: STUDENT IN AN ORGANIZED HEALTH CARE EDUCATION/TRAINING PROGRAM

## 2021-11-23 PROCEDURE — 6360000002 HC RX W HCPCS: Performed by: STUDENT IN AN ORGANIZED HEALTH CARE EDUCATION/TRAINING PROGRAM

## 2021-11-23 PROCEDURE — 6370000000 HC RX 637 (ALT 250 FOR IP): Performed by: NURSE PRACTITIONER

## 2021-11-23 PROCEDURE — 6360000002 HC RX W HCPCS: Performed by: FAMILY MEDICINE

## 2021-11-23 PROCEDURE — 82948 REAGENT STRIP/BLOOD GLUCOSE: CPT

## 2021-11-23 PROCEDURE — 6370000000 HC RX 637 (ALT 250 FOR IP): Performed by: FAMILY MEDICINE

## 2021-11-23 PROCEDURE — 6360000002 HC RX W HCPCS: Performed by: NURSE PRACTITIONER

## 2021-11-23 PROCEDURE — 83735 ASSAY OF MAGNESIUM: CPT

## 2021-11-23 PROCEDURE — 84100 ASSAY OF PHOSPHORUS: CPT

## 2021-11-23 PROCEDURE — 2580000003 HC RX 258: Performed by: STUDENT IN AN ORGANIZED HEALTH CARE EDUCATION/TRAINING PROGRAM

## 2021-11-23 PROCEDURE — 6370000000 HC RX 637 (ALT 250 FOR IP): Performed by: INTERNAL MEDICINE

## 2021-11-23 PROCEDURE — 80053 COMPREHEN METABOLIC PANEL: CPT

## 2021-11-23 PROCEDURE — C9113 INJ PANTOPRAZOLE SODIUM, VIA: HCPCS | Performed by: NURSE PRACTITIONER

## 2021-11-23 PROCEDURE — 94003 VENT MGMT INPAT SUBQ DAY: CPT

## 2021-11-23 PROCEDURE — 99233 SBSQ HOSP IP/OBS HIGH 50: CPT | Performed by: INTERNAL MEDICINE

## 2021-11-23 PROCEDURE — 2580000003 HC RX 258: Performed by: EMERGENCY MEDICINE

## 2021-11-23 PROCEDURE — 90935 HEMODIALYSIS ONE EVALUATION: CPT

## 2021-11-23 PROCEDURE — APPSS30 APP SPLIT SHARED TIME 16-30 MINUTES: Performed by: NURSE PRACTITIONER

## 2021-11-23 RX ADMIN — TIOTROPIUM BROMIDE AND OLODATEROL 2 PUFF: 3.124; 2.736 SPRAY, METERED RESPIRATORY (INHALATION) at 08:31

## 2021-11-23 RX ADMIN — SODIUM CHLORIDE SOLN NEBU 3% 4 ML: 3 NEBU SOLN at 12:11

## 2021-11-23 RX ADMIN — PIPERACILLIN AND TAZOBACTAM 3375 MG: 3; .375 INJECTION, POWDER, LYOPHILIZED, FOR SOLUTION INTRAVENOUS at 11:59

## 2021-11-23 RX ADMIN — PANTOPRAZOLE SODIUM 40 MG: 40 INJECTION, POWDER, FOR SOLUTION INTRAVENOUS at 09:28

## 2021-11-23 RX ADMIN — SODIUM CHLORIDE SOLN NEBU 3% 4 ML: 3 NEBU SOLN at 18:01

## 2021-11-23 RX ADMIN — SODIUM HYPOCHLORITE: 1.25 SOLUTION TOPICAL at 12:00

## 2021-11-23 RX ADMIN — ALBUTEROL SULFATE 2.5 MG: 2.5 SOLUTION RESPIRATORY (INHALATION) at 06:04

## 2021-11-23 RX ADMIN — SODIUM CHLORIDE, PRESERVATIVE FREE 10 ML: 5 INJECTION INTRAVENOUS at 09:28

## 2021-11-23 RX ADMIN — CHLORHEXIDINE GLUCONATE 15 ML: 1.2 RINSE ORAL at 09:28

## 2021-11-23 RX ADMIN — METOCLOPRAMIDE HYDROCHLORIDE 5 MG: 5 INJECTION INTRAMUSCULAR; INTRAVENOUS at 18:31

## 2021-11-23 RX ADMIN — ALBUTEROL SULFATE 2.5 MG: 2.5 SOLUTION RESPIRATORY (INHALATION) at 00:27

## 2021-11-23 RX ADMIN — ALLOPURINOL 100 MG: 100 TABLET ORAL at 09:28

## 2021-11-23 RX ADMIN — PIPERACILLIN AND TAZOBACTAM 3375 MG: 3; .375 INJECTION, POWDER, LYOPHILIZED, FOR SOLUTION INTRAVENOUS at 21:46

## 2021-11-23 RX ADMIN — SODIUM CHLORIDE, PRESERVATIVE FREE 10 ML: 5 INJECTION INTRAVENOUS at 21:45

## 2021-11-23 RX ADMIN — ALBUTEROL SULFATE 2.5 MG: 2.5 SOLUTION RESPIRATORY (INHALATION) at 18:01

## 2021-11-23 RX ADMIN — FLUOXETINE 40 MG: 20 CAPSULE ORAL at 09:28

## 2021-11-23 RX ADMIN — LORAZEPAM 1 MG: 2 INJECTION INTRAMUSCULAR; INTRAVENOUS at 22:07

## 2021-11-23 RX ADMIN — METOCLOPRAMIDE HYDROCHLORIDE 5 MG: 5 INJECTION INTRAMUSCULAR; INTRAVENOUS at 00:56

## 2021-11-23 RX ADMIN — DEXTROSE MONOHYDRATE: 100 INJECTION, SOLUTION INTRAVENOUS at 05:37

## 2021-11-23 RX ADMIN — TOBRAMYCIN 300 MG: 1.2 INJECTION, POWDER, LYOPHILIZED, FOR SOLUTION INTRAVENOUS at 08:31

## 2021-11-23 RX ADMIN — METOCLOPRAMIDE HYDROCHLORIDE 5 MG: 5 INJECTION INTRAMUSCULAR; INTRAVENOUS at 09:28

## 2021-11-23 RX ADMIN — ALBUTEROL SULFATE 2.5 MG: 2.5 SOLUTION RESPIRATORY (INHALATION) at 12:11

## 2021-11-23 RX ADMIN — Medication 1 CAPSULE: at 09:28

## 2021-11-23 ASSESSMENT — PULMONARY FUNCTION TESTS
PIF_VALUE: 17
PIF_VALUE: 18
PIF_VALUE: 24
PIF_VALUE: 18

## 2021-11-23 ASSESSMENT — PAIN SCALES - GENERAL: PAINLEVEL_OUTOF10: 0

## 2021-11-23 NOTE — PROGRESS NOTES
HOSPITALIST PROGRESS NOTE    Patient - Gypsy Rodriguez   MRN -  [id number]   Thomas # - [de-identified]   - 1950      Date of Admission -  10/27/2021  1:36 PM  Date of evaluation -  2021  Room - [address] [place] [address]-A   Hospital Day - 32  Primary Care Physician - Renee Berger MD   Code Status: FULL CODE    Chief Complaint:    Shortness of breath     History Of Presenting Illness:     71F admitted to [facility] 10/27/21 w/ SOB. Current smoker w/ PMH PAH grp 3, HFpEF, stage III sacral ulcer s/p wound ostomy . Patient sister reports SpO2 51% at home and was brought to ED where ABG showed pH 7.19, PCO2 80, PO2 134. She required emergent intubation and was transferred to ICU. Workup revealed left sided pleural effusion and underwent US guided thoracentesis w/ 1100 cc removed consistent w/ MRSA/adenovirus. Patient was noted to be in afib/RVR and was placed on amio, heparin drip. Patient was also noted to have normocytic anemia and received 1 unit PRBC 10/31/21. CT abdomen revealed CBD dilation w/ cholelithiasis.      2021: Plan for nephrostomy tubes today. Hemoglobin 7.7 s/p 1 unit PRBC yesterday. Weaned to 4 mics Levophed. 2021: Patient resumed back on prior dose of vancomycin. Low urine output w/ increasing pressor requirements today  2021: Will attempt SBT if able to wean today. Increased Amio drip 2/2 worsening tachycardia. Continued hematuria w/ low urine output. Cr stable. Will evaluate UTI following perc tube placement. Rise in WBC noted. Culture pending  2021: Cardioversion on 11/3/2021. Now and NSR. CVP 29 this a.m. Suspect drop in CI 2/2 to stopping levophed. Diffuse edema following transfusion overnight. Given one-time dose of 2 mg Bumex and albumin. SBT this AM  2021: Failed SBT yesterday. Continued low urine output. Trial of Bumex today. Will proceed with dialysis if fails to improve.   2021: Started on CRRT, continues to have bloody drainage of nephrostomy tube given 1 unit PRBC, cefipime emperically pending cultures of nephrostomy tube fluid and repeat resp cultures. 11/07/21: Patient remains clinically well on exam. We'll continue medical of UF with CRRT. On empiric cefepime for coverage of perinephric abscess noted on CT abdomen and pelvis November 4, 2021. White count trending down. Will attempt spontaneous breathing trial in a.m.  11/08/21: Transfused 1x PRBC this AM. Anemia workup pending. WBC trending down. Patient ventilator settings this AM preclude SBT. Volume overloaded on exam. UF increased to 125 cc/hr this AM. Will attempt to wean vent settings further further. Coag neg staph growing on nephrostomy tube aspirate. Suspect contamination. Continue existing ABx  11/09/21: Pulse dose steroids initiated overnight. Started on SSI. Urine output now improving 75cc/hr. Transfused 3 units PRBCs yesterday for Improve DO2. MERCEDES noted w/ 875 mg deficit. Will replace once steroids/ABx completed. 11/10/21: Extubated overnight. SLP eval today. Continue BiPAP while asleep w/ transition to NC while awake as tolerated. No tachypnea. Day 3 pulse dose steroids. Continued bloody drainage from nephrostomy tube. Holding [facility] [place], operated by [facility]   11/11/21: Worsening bilateral infiltrates with SpO2 <90% on HFNC. Continued pulmonary hygiene w/ nebulized hypertonic saline, acapella and breathing treatments in addition to deep suctioning. Continued on Vancomycin for MRSA PNA. Plan for family meeting this AM to discuss goals of care. 11/12/21: Follow-up discussion from family meeting. Patient nodded agreement with full code status including reintubation leading to tracheostomy and PEG placement and continued hemodialysis if needed. Patient nodded agreement and understanding with these decisions. CXR yesterday evening did show left mainstem mucus plugging with cutoff sign and absent breath sounds and did undergo emergent bronchoscopy overnight on 11/12/21.  Diminished breath sounds and worsening respiratory status this AM concerning for continued plugging. Stat chest X-ray ordered for confirmation. 11/13/21: Respiratory status continues to decline, she is requiring BiPAP with FiO2 of 70%. Chest x-ray shows complete opacification of the left lung secondary to mucous plugging, also the patient has left-sided pleural effusion. Plan for bronchoscopy. 11/15/2021: Family meeting today per event note. Reintubated today after failed BiPAP  11/16/2021: will dc abx tomorrow. PNA panel negative. Plan for tracheostomy time permitting today. Weaning phenylephrine as tolerated. 11/17/2021: Necrotic Stage IV pressure ulcer probing to bone noted this AM. CT abdomen and pelvis pending to evaluate for abscess. Will discuss with family today when available. 11/18/2021: CT showed bony involvement of sacral ulcer w/ concern for osteomyelitis. Wound ostomy following. Culture pending. Started on broad spectrum therapy with Vancomycin and Zosyn for coverage of suspect osteomyelitis. Weaning sedation as tolerated  11/19/21: Wound culture pending. On broad spectrum ABx for coverage of osteomyelitis. Will de-escalate pending culture/sensitiivity. Consult to GI for PEG tube evaluation. 11/21/21: Tolerated conventional HD on 11/20/21 w/ 2500cc removed. Wound culture growing gram negative bacilli. Vanc discontinued. Weaned off phenylephrine. Will monitor overnight. Okay to transfer to step down in AM  11/22/21: FiO2 30%, rate of 8 L/min. Remains on tube feeds on day 5 Zosyn. Bronchoscopy for hemoptysis unremarkable findings of old bloody residual likely from trach procedure. 11/23/21: See below  Subjective (Last 24 Hours):    Patient laying in bed comfortably this morning with no complaints or concerns on minimal trach vent settings. Remains on tube feeds on day 6 Zosyn. Creatinine bumped up to 3.5 today with Na low at 124. Nephrology planning for HD today with tunneled cath next. GI planning for PEG tube placement tomorrow.  Otherwise well.     All other ROS negative. Medications    Current Medications    tobramycin (PF)  300 mg Nebulization BID    metoclopramide  5 mg IntraVENous Q8H    sodium chloride (Inhalant)  4 mL Nebulization Q6H    And    albuterol  2.5 mg Nebulization Q6H    lactobacillus  1 capsule Per NG tube Daily with breakfast    piperacillin-tazobactam  3,375 mg IntraVENous Q12H    chlorhexidine  15 mL Mouth/Throat BID    ketamine  100 mg IntraVENous Once    lidocaine 1 % injection  5 mL IntraDERmal Once    insulin lispro  0-6 Units SubCUTAneous Q6H    allopurinol  100 mg Oral Daily    phosphorus replacement protocol   Other RX Placeholder    potassium bicarb-citric acid  40 mEq Oral Once    tiotropium-olodaterol  2 puff Inhalation Daily    [Held by provider] aspirin  81 mg Oral Daily    pantoprazole  40 mg IntraVENous QAM    sodium hypochlorite   Irrigation Daily    Riociguat  2.5 mg Oral Q8H    sodium chloride flush  5-40 mL IntraVENous 2 times per day    FLUoxetine  40 mg Oral Daily    [Held by provider] metoprolol tartrate  12.5 mg Oral BID     LORazepam, lidocaine PF, ondansetron, albuterol, fentanNYL, glucose, dextrose, glucagon (rDNA), dextrose, polyethylene glycol, senna, acetaminophen, sodium chloride, sodium chloride flush  IV Drips/Infusions   dextrose      sodium chloride Stopped (11/17/21 0709)    [Held by provider] sodium chloride 50 mL/hr at 10/28/21 2314     Diet    Diet NPO  ADULT TUBE FEEDING; Orogastric; Renal Formula; Continuous; 10; Yes; 10; Q 8 hours; 30; 30; Q 4 hours; Protein; 1 ( 2.5 oz) liquid protein daily  Allergies    Patient has no known allergies. Vitals     height is 4' 9\" (1.448 m) and weight is 170 lb (77.1 kg). Her oral temperature is 98.7 °F (37.1 °C). Her blood pressure is 126/61 and her pulse is 88. Her respiration is 20 and oxygen saturation is 98%. Body mass index is 36.79 kg/m².     SUPPLEMENTAL O2: O2 Flow Rate (L/min): 8 L/min   Input/Output       Intake/Output Summary (Last 24 hours) at 11/23/2021 1432  Last data filed at 11/23/2021 0448  Gross per 24 hour   Intake 1494.4 ml   Output 720 ml   Net 774.4 ml     I/O last 3 completed shifts: In: 1494.4 [I.V.:789.3; NG/GT:615; IV Piggyback:90.1]  Out: 720 [Urine:170; Stool:550]   Patient Vitals for the past 96 hrs (Last 3 readings):   Weight   11/23/21 0429 170 lb (77.1 kg)   11/22/21 0421 171 lb (77.6 kg)   11/20/21 1625 174 lb 6.1 oz (79.1 kg)     Physical Exam   General: Acute on chronically ill-appearing elderly white female on trach ventilation   HEENT: Temporal wasting appreciated. Normocephalic and atraumatic. No scleral icterus. PERR  Neck: supple. No Thyromegaly. Left subclavian dialysis catheter   Lungs: Mild diffuse rhonchi appreciated bilaterally No retractions  Cardiac: RRR. No JVD. Abdomen: soft. Nontender,nephrostomy tube with bloody drainage  Extremities: +1 pitting edema x4. (interval improvement noted) No clubbing, cyanosis   Vasculature: capillary refill < 3 seconds. Palpable dorsalis pedis pulses. Skin:  warm and dry. Psych: Alert and oriented to person place and time, affect appropriate  Lymph:  No supraclavicular adenopathy. Neurologic:  No focal deficit. No seizures.   Labs   ABG  Lab Results   Component Value Date    PH 7.36 11/13/2021    PO2 59 11/13/2021    PCO2 45 11/13/2021    HCO3 26 11/13/2021    O2SAT 89 11/13/2021     Lab Results   Component Value Date    IFIO2 70 11/13/2021    MODE PC 11/05/2021    SETPEEP 8.0 11/15/2021     CBC  Recent Labs     11/21/21  0410 11/22/21  0340 11/23/21  0400   WBC 7.2 5.8 4.8   RBC 2.78* 2.83* 2.97*   HGB 8.2* 8.4* 8.7*   HCT 26.9* 26.8* 27.6*   MCV 96.8 94.7 92.9   MCH 29.5 29.7 29.3   MCHC 30.5* 31.3* 31.5*   * 130 159   MPV 9.6 9.2* 9.8      BMP  Recent Labs     11/21/21  0410 11/22/21  0340 11/23/21  0400   * 129* 124*   K 3.5 3.7 4.0   CL 96* 93* 88*   CO2 25 23 21*   BUN 31* 39* 54*   CREATININE 2.5* 3.0* 3.5*   GLUCOSE 113* 104 166*   MG 1.9 1.9 1.8   PHOS 4.1 4.1 4.3   CALCIUM 8.2* 8.3* 8.5     LFT  Recent Labs     11/21/21  0410 11/22/21  0340 11/23/21  0400   AST 13 12 11   ALT 14 13 13   BILITOT 0.4 0.4 0.3   ALKPHOS 107 106 115     TROP  Lab Results   Component Value Date    TROPONINT 0.084 10/28/2021    TROPONINT 0.059 10/27/2021    TROPONINT 0.037 03/06/2020     BNP  No results for input(s): BNP in the last 72 hours. Lactic Acid  No results for input(s): LACTA in the last 72 hours. INR  No results for input(s): INR, PROTIME in the last 72 hours. PTT  No results for input(s): APTT in the last 72 hours. Glucose  Recent Labs     11/22/21  2320 11/23/21  0539 11/23/21  1151   POCGLU 165* 153* 139*     Microbiology     Culture with Smear, Acid Fast Bacillius [[id number]] Collected: 10/27/21 1550   Order Status: Completed Specimen: Body Fluid Updated: 11/23/21 1331    AFB Culture & Smear SEE BELOW   Culture, Anaerobic and Aerobic [[id number]] (Abnormal)  Collected: 11/19/21 1048   Order Status: Completed Specimen: Coccyx Updated: 11/23/21 0601    Aerobic Culture Culture also yielded very light growth of Staphylococcus species (coagulase negative), and additional enteric gram negative bacilli. At least one of drugs in current antibiotic therapy is ineffective in vitro for isolate.  Abnormal     Anaerobic Culture Culture overgrown by swarming Proteus species. No further evaluation of this culture is possible. If a true mixed aerobic and anaerobic infection is suspected, then broad spectrum empiric antibiotic therapy is indicated and should include coverage for anaerobic organisms. Gram Stain Result No segmented neutrophils observed. No epithelial cells observed. No organisms observed.      Organism Proteus mirabilis Abnormal     Aerobic Culture moderate growth     Organism Pseudomonas aeruginosa Abnormal     Aerobic Culture light growth      Procedures   - Hemodialysis on 11/23/21    Problem List      Active Hospital Problems    Diagnosis Date Noted    Paroxysmal atrial fibrillation ([address] [I48.0] 11/22/2021    Hemoptysis [R04.2]     Chronic respiratory failure with hypoxia (HCC) [J96.11]     Aspiration pneumonia of left lung ([address] [J69.0]     Pleural effusion [J90]     Shock ([address] [R57.9]     Flash pulmonary edema ([address] [J81.0]     Acute respiratory failure ([address] [J96.00] 10/27/2021    Pressure ulcer of right buttock, stage 3 ([address] [L89.313] 07/29/2021    Pulmonary HTN ([address] [I27.20] 09/03/2019      Assessment & Plan:   1. Subacute combined hypercapnic and hypoxic respiratory failure: D/t to pneumonia with L-sided pleural effusion and repeated mucous plugging. Intubated 10/27/21. Extubated following successful SBT on 11/9/21. Failed HFNC and was reintubated on 11/15/21. Tracheostomy placed 11/17/21.    - Wean as tolerated   - Lung protective strategies    - Aspiration precautions . 2. 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 Probes to bone, necrosis noted around wound site. CT abdomen and pelvis showing possible mi involvement with concern for osteomyelitis. Seen by general surgery on 11/17/21. No indications for debridement at the time. Poor candidate for surgery given multiple co-morbidities, malnutrition and poor wound healing. Wound cultures from 11/19 growing proteus mirabilis and pseudomonas aeruginosa. - On day 6 Zosyn, completes course tomorrow     - Continue Dakins w/ dressing changes   - Culture growing gram negative bacilli   - Wound care team evaluated this morning with dressing changes. 3. Stage III ALVIN on CKD 3: Suspect post renal etiology 2/2 staghorn calculi. Nephrology following. CRRT 11/5/21 until 11/12/21. Anti-DS-DNA negative, Anti histone negative, Anti Smith,and  Anti-Savana WNL, GBM antibodies negative, C3/C4 levels normal, elevated kappa/lambda free light chains with normal ratio. HD started 11/20/21. Positive for ANCA associated vasculitis. ANCA associated Abs show elevated anti MPO elevated 416 and serine proteinase 3 IgG WNL   - Plans for biopsy per nephrology when stable   - CVVH stopped 11/15/21 significant oliguria since that time   - Hemodialysis today 11/23/21  4. Hemoptysis (resolved): Bronchoscopy on 11/22 remarkable for old blood stained mucus in RLL. No active bleed. - Pulm following, appreciate recs     - Jose Alfredo 300 mg by nebulization twice daily   - SCDs bilaterally, hold AC  5. Acute blood loss anemia with chronic normocytic anemia: 2/2 hematuria s/p perc tube placement w/ Iron studies consistent w/ MERCEDES vs Anemia chronic disease. B12 WNL, Folate low, Abs reticulocyte index 11/18/21 0.49% Soluable transferrin 139. Calculated Iron deficit 827mg. PRBC x1 transfused 10/31/21, 11/4/21, 11/5/21, x2 11/6/21, x3 11/8/21, x3 11/15/21.    - Heparin stopped (dialysis dose still running)   - Continue holding ASA    - Venofer, replace folate once ABx stopped  6. Hyponatremia: 124 on 11/23 in setting of ALVIN stage III on CKD. Will get HD on 11/23/21. Monitor with daily BMP   7. Hematuria: s/p perc tube placement. CBI per above. Stopped heparin, holding ASA. 8. Dysphagia: 2/2 prolonged intubation.    - NPO per SLP   - GI to place PEG tube 11/24/21  9. ANCA associated Vasculitis: workup per above. Will need Renal Bx for confirmation once stable. Discussed case w/ Nephrology. Completed pulse dose steroids starting on 11/8/21 w/ 10mg/kg methylprednisolone for 3 days. SSI started for coverage. Not candidate for Rituxin/TPE 2/2 existing infection. 10. Bilateral Pleural Effusion:   Associated volume overload in context of renal failure. Interval enlargement noted on CT 11/1/21. Currently on CVVH for effusions. Plan for chest tube insertion if no improvement  11. Suspected COPD: current every day smoker. Obstructive process noted on flow volume loop on ventilator.  Started empiric therapy with Stiolto. Recommend formal PFT once improved  12. PAH/chronic RV failure NYHA II: EF 55 to 60% G2 DD TTE 5/4/2021. RHC showed [place] [address] with elevated PCWP. Treated w/ Riociguat. 13. Chronic tobacco use:  cessation  14. Gout: Continue allopurinol  15. BARBER: non compliant w/ CPAP   16. Septic Shock: Resolved. 2/2 severe PNA now concern for necrotizing wound on sacrum. Initial CI 6.3 w/ NE precludes concern for septic shock NE d/c 2/2 worsening afib RVR  17. Thrombocytopenia: Resolved. Suspect consumptive process in context of sepsis. Not on heparin    18. Severe bilateral multiorganism pneumonia: Resolved. PCR positive Staph w/ MECA gene and adenovirus consistent w/ MRSA colonization. Staph completed 14 days Vancomycin (started 10/28/21) and completed 5 days Rocephin. Clindamycin started for coverage of PVL as patient clinically worsening following 14 days Vancomycin for MRSA coverage. Underwent bronchoscopy for left mainstem mucus plug noted CXR on 11/11/21. Switched to Clindamycin for coverage of PVL received 8 days therapy (started 11/11/21). PNA panel from 11/15/21 negative.  Repeat cultures show no growth    PT/OT Eval Status: Daily PT    VTE prophylaxis: [] Lovenox                                 [x] SCDs                                 [] SQ Heparin                                 [] Encourage ambulation           [] Already on Anticoagulation     IV Fluids: None   Diet:  Tube feeds   Code Status: Full Code  Disposition: [place] once more stable     Tele:   [x] yes             [] no    Electronically signed by   Gloria Aguirre DO on 11/23/2021 at 2:32 PM

## 2021-11-23 NOTE — PROGRESS NOTES
[facility]      Patient - Miguel Ángel Chau   MRN -  [id number]   [facility] # - [de-identified]   - 1950      Date of Admission -  10/27/2021  1:36 PM  Date of evaluation -  2021  Room - -[facility]-   Hospital Day - 32  Consulting - Carola Tomlinson MD Primary Care Physician - Dalia Esquivel MD     Problem List      Active Hospital Problems    Diagnosis Date Noted    Paroxysmal atrial fibrillation ([address] [I48.0] 2021    Hemoptysis [R04.2]     Chronic respiratory failure with hypoxia (HCC) [J96.11]     Aspiration pneumonia of left lung ([address] [J69.0]     Pleural effusion [J90]     Shock ([address] [R57.9]     Flash pulmonary edema ([address] [J81.0]     Acute respiratory failure ([address] [J96.00] 10/27/2021    Pressure ulcer of right buttock, stage 3 ([address] [L89.313] 2021    Pulmonary HTN ([address] [I27.20] 2019     Reason for Consult    Vent Management, hemoptysis  HPI   History Obtained From: Patient's niece at bedside and electronic medical record. Miguel Ángel Chau is a 73 y.o. female never smoker with PMHx chronic diastolic CHF (EF 83-97%, W9XD per TTE 21) / NYHA II / chronic RV failure / severe PAH (PA 85/31 - mean 52 / PVR 10), mild AS (valve area 1.7 sq cm), morbid obesity, HTN and depression -- presents to [facility] with a chief complaint of shortness of breath. History obtained from the EMR (patient intubated / sedated on the ventilator).     Patient presented to [facility] ED after her sister noticed the patient was experiencing worsening shortness of breath throughout the day / recorded SpO2 51% on home pulse oximeter prior to arrival. The patient has a known history of CHF and PAH, for which she follows with Dr. Luana Pozo (cardiology) and [facility] [address] / currently on adempas. Per report, she was recently discontinued from oral bumex, which she stopped taking just this morning.  She endorsed mild headache on arrival (poorly characterized), but no associated Current Medications    tobramycin (PF)  300 mg Nebulization BID    metoclopramide  5 mg IntraVENous Q8H    sodium chloride (Inhalant)  4 mL Nebulization Q6H    And    albuterol  2.5 mg Nebulization Q6H    lactobacillus  1 capsule Per NG tube Daily with breakfast    piperacillin-tazobactam  3,375 mg IntraVENous Q12H    chlorhexidine  15 mL Mouth/Throat BID    ketamine  100 mg IntraVENous Once    lidocaine 1 % injection  5 mL IntraDERmal Once    insulin lispro  0-6 Units SubCUTAneous Q6H    allopurinol  100 mg Oral Daily    phosphorus replacement protocol   Other RX Placeholder    potassium bicarb-citric acid  40 mEq Oral Once    tiotropium-olodaterol  2 puff Inhalation Daily    [Held by provider] aspirin  81 mg Oral Daily    pantoprazole  40 mg IntraVENous QAM    sodium hypochlorite   Irrigation Daily    Riociguat  2.5 mg Oral Q8H    sodium chloride flush  5-40 mL IntraVENous 2 times per day    FLUoxetine  40 mg Oral Daily    [Held by provider] metoprolol tartrate  12.5 mg Oral BID     LORazepam, lidocaine PF, ondansetron, albuterol, fentanNYL, glucose, dextrose, glucagon (rDNA), dextrose, polyethylene glycol, senna, acetaminophen, sodium chloride, sodium chloride flush  IV Drips/Infusions   dextrose      sodium chloride Stopped (11/17/21 0709)    [Held by provider] sodium chloride 50 mL/hr at 10/28/21 2239     Home Medications  Medications Prior to Admission: metoprolol tartrate (LOPRESSOR) 25 MG tablet, Take 0.5 tablets by mouth 2 times daily  sodium hypochlorite (DAKINS) 0.125 % SOLN external solution, Apply Dakin's moistened gauze dressings to wound twice daily and as needed.   sodium chloride 1 g tablet, Take 1 tablet by mouth 2 times daily  allopurinol (ZYLOPRIM) 300 MG tablet, Take 1 tablet by mouth daily  FLUoxetine (PROZAC) 40 MG capsule, Take 1 capsule by mouth daily  potassium chloride (KLOR-CON M) 10 MEQ extended release tablet, Take 1 tablet by mouth every other day  bumetanide (BUMEX) 1 MG tablet, Take 1 tablet by mouth daily  Multiple Vitamins-Minerals (MULTIVITAMIN WOMEN PO), Take 1 tablet by mouth daily  acetaminophen (TYLENOL) 650 MG extended release tablet, Take 650 mg by mouth every 8 hours as needed for Pain  Riociguat (ADEMPAS) 2.5 MG TABS, Take 2.5 mg by mouth 3 times daily  docusate sodium (COLACE) 100 MG capsule, Take 100 mg by mouth as needed   aspirin 81 MG tablet, Take 81 mg by mouth daily   Diet    Diet NPO  ADULT TUBE FEEDING; Orogastric; Renal Formula; Continuous; 10; Yes; 10; Q 8 hours; 30; 30; Q 4 hours; Protein; 1 ( 2.5 oz) liquid protein daily  Allergies    Patient has no known allergies. Social History     Social History     Socioeconomic History    Marital status: Single     Spouse name: Not on file    Number of children: 0    Years of education: Not on file    Highest education level: Not on file   Occupational History    Not on file   Tobacco Use    Smoking status: Never Smoker    Smokeless tobacco: Never Used   Vaping Use    Vaping Use: Never used   Substance and Sexual Activity    Alcohol use: No    Drug use: No    Sexual activity: Not Currently   Other Topics Concern    Not on file   Social History Narrative    Not on file     Social Determinants of Health     Financial Resource Strain: Low Risk     Difficulty of Paying Living Expenses: Not very hard   Food Insecurity: No Food Insecurity    Worried About Running Out of Food in the Last Year: Never true    Traci of Food in the Last Year: Never true   Transportation Needs:     Lack of Transportation (Medical): Not on file    Lack of Transportation (Non-Medical):  Not on file   Physical Activity:     Days of Exercise per Week: Not on file    Minutes of Exercise per Session: Not on file   Stress:     Feeling of Stress : Not on file   Social Connections:     Frequency of Communication with Friends and Family: Not on file    Frequency of Social Gatherings with Friends and Family: Not on file  Attends Confucianist Services: Not on file    Active Member of Clubs or Organizations: Not on file    Attends Club or Organization Meetings: Not on file    Marital Status: Not on file   Intimate Partner Violence:     Fear of Current or Ex-Partner: Not on file    Emotionally Abused: Not on file    Physically Abused: Not on file    Sexually Abused: Not on file   Housing Stability:     Unable to Pay for Housing in the Last Year: Not on file    Number of Places Lived in the Last Year: Not on file    Unstable Housing in the Last Year: Not on file     Family History          Problem Relation Age of Onset    Diabetes Father    Sundar Gramajo Arthritis Mother     COPD Mother     Diabetes Sister     Heart Disease Maternal Uncle     Breast Cancer Niece 36    Sleep Apnea Brother     Asthma Neg Hx     Birth Defects Neg Hx     Cancer Neg Hx     Depression Neg Hx     Early Death Neg Hx     Hearing Loss Neg Hx     High Blood Pressure Neg Hx     High Cholesterol Neg Hx     Kidney Disease Neg Hx     Learning Disabilities Neg Hx     Mental Illness Neg Hx     Mental Retardation Neg Hx     Miscarriages / Stillbirths Neg Hx     Stroke Neg Hx     Substance Abuse Neg Hx     Vision Loss Neg Hx     Other Neg Hx      Sleep History    Never diagnosed with sleep apnea in the past.  Occupational history   Occupation:  She is current working: No  Type of profession: unemployed, disabled.                      History of tobacco smoking:No     History of recreational or IV drug use in the past:NO     History of exposure to coal mines/coal dust: NO  History of exposure to foundry dust/welding: NO  History of exposure to quarry/silica/sandblasting: NO  History of exposure to asbestos/working with breaks/ships: NO  History of exposure to farm dust: NO  History of recent travel to long distances: NO  History of exposure to birds, pigeons, or chickens in the past:NO    History of pulmonary embolism in the past: No            History of DVT in the past:No              Vitals     height is 4' 9\" (1.448 m) and weight is 170 lb (77.1 kg). Her oral temperature is 98.7 °F (37.1 °C). Her blood pressure is 126/61 and her pulse is 88. Her respiration is 20 and oxygen saturation is 98%. Body mass index is 36.79 kg/m². SUPPLEMENTAL O2: O2 Flow Rate (L/min): 8 L/min     I/O        Intake/Output Summary (Last 24 hours) at 11/23/2021 1503  Last data filed at 11/23/2021 1436  Gross per 24 hour   Intake 1858.4 ml   Output 770 ml   Net 1088.4 ml     I/O last 3 completed shifts: In: 1858.4 [I.V.:789.3; NG/GT:979; IV Piggyback:90.1]  Out: 310 [Urine:170; Stool:600]   Patient Vitals for the past 96 hrs (Last 3 readings):   Weight   11/23/21 0429 170 lb (77.1 kg)   11/22/21 0421 171 lb (77.6 kg)   11/20/21 1625 174 lb 6.1 oz (79.1 kg)       Exam   Physical Exam   Constitutional: No distress on vent via trach. Patient appears moderately built. Head: Normocephalic and atraumatic. Mouth/Throat: Oropharynx is clear and moist.  No oral thrush. Eyes: Conjunctivae are normal. Pupils are equal, round. No scleral icterus. Neck: Neck supple. #7.5 mm bivona portex in place   Cardiovascular: S1 and S2 with no murmur. No peripheral edema  Pulmonary/Chest: Normal effort with bilateral air entry, clear breath sounds. No stridor. No respiratory distress. Patient exhibits no tenderness. Abdominal: Soft. Bowel sounds audible. No distension or tenderness to palp. Musculoskeletal: Moves all extremities  Neurological: Patient is alert and follows simple commands.    Labs  - Old records and notes have been reviewed in [facility]   ABG  Lab Results   Component Value Date    PH 7.36 11/13/2021    PO2 59 11/13/2021    PCO2 45 11/13/2021    HCO3 26 11/13/2021    O2SAT 89 11/13/2021     Lab Results   Component Value Date    IFIO2 70 11/13/2021    MODE PC 11/05/2021    SETPEEP 8.0 11/15/2021     CBC  Recent Labs     11/21/21  0410 11/22/21  0340 11/23/21  0400   WBC 7.2 5.8 4.8 RBC 2.78* 2.83* 2.97*   HGB 8.2* 8.4* 8.7*   HCT 26.9* 26.8* 27.6*   MCV 96.8 94.7 92.9   MCH 29.5 29.7 29.3   MCHC 30.5* 31.3* 31.5*   * 130 159   MPV 9.6 9.2* 9.8      BMP  Recent Labs     11/21/21  0410 11/22/21  0340 11/23/21  0400   * 129* 124*   K 3.5 3.7 4.0   CL 96* 93* 88*   CO2 25 23 21*   BUN 31* 39* 54*   CREATININE 2.5* 3.0* 3.5*   GLUCOSE 113* 104 166*   MG 1.9 1.9 1.8   PHOS 4.1 4.1 4.3   CALCIUM 8.2* 8.3* 8.5     LFT  Recent Labs     11/21/21  0410 11/22/21  0340 11/23/21  0400   AST 13 12 11   ALT 14 13 13   BILITOT 0.4 0.4 0.3   ALKPHOS 107 106 115     TROP  Lab Results   Component Value Date    TROPONINT 0.084 10/28/2021    TROPONINT 0.059 10/27/2021    TROPONINT 0.037 03/06/2020     BNP  No results for input(s): BNP in the last 72 hours. Lactic Acid  No results for input(s): LACTA in the last 72 hours. INR  No results for input(s): INR, PROTIME in the last 72 hours. PTT  No results for input(s): APTT in the last 72 hours. Glucose  Recent Labs     11/22/21  2320 11/23/21  0539 11/23/21  1151   POCGLU 165* 153* 139*     UA No results for input(s): SPECGRAV, PHUR, COLORU, CLARITYU, MUCUS, PROTEINU, BLOODU, RBCUA, WBCUA, BACTERIA, NITRU, GLUCOSEU, BILIRUBINUR, UROBILINOGEN, KETUA, LABCAST, LABCASTTY, AMORPHOS in the last 72 hours. Invalid input(s): CRYSTALS. PFTs           Sleep studies   Study Results  Initial Study Date -  11/7/19  AHI -  5.8   TotalEvents - 32  (Apneas  19  Hypopneas 13  Central  0)  LM w/Arousals - 0  Sleep Efficiency - 70.8 % (Total Sleep Time - 330 min)  Time with Sats below 88% - 0 min    Cultures    -Anaerobic and aerobic culture: (From Coccyx) Positive for Proteus mirabilis and Pseudomonas aeruginosa, culture also yielded very light growth of Staphylococcus species (coagulase negative), and additional enteric gram negative bacilli. At least one of drugs in current antibiotic therapy is ineffective in vitro for isolate.  (11/19/21)  -Respiratory was normal.      Left Ventricle   Normal left ventricular size and systolic function. There were no regional wall motion abnormalities. Wall thickness was within normal limits. Ejection fraction was estimated at 60-65%. Right Atrium   Right atrial size was normal.      Right Ventricle   The right ventricular size was normal with normal systolic function and   wall thickness. Pericardial Effusion   The pericardium was normal in appearance with a small, non-hemodynamically   significant pericardial effusion. Prominent pericardial fat pad. Pleural Effusion   No evidence of pleural effusion. Aorta / Great Vessels   IVC size is dilated with reduced respiratory phasic changes (CVP~10-15   mmHg). Radiology    CXR  PORT CXR 11/19/21:  There is a Dobbhoff tube which projects over the stomach. There is a left-sided percutaneous nephrostomy tube, stable compared to prior CT. 1. Mild hepatomegaly. Left subclavian dialysis catheter with tip at cavoatrial junction. NG tube passes into stomach. Tracheostomy tube in good position. Right jugular line with catheter tip in right atrium. 2. Tiny bilateral pleural effusions. Moderate pneumonia/pulmonary edema scattered relatively diffusely in both lungs. 3. Overall appearance of chest has worsened somewhat since prior.       CT Scans  (See actual reports for details)    CT chest without contrast on 11/1/21: There are moderate bilateral pleural effusions which have mildly increased in size in the interval. There is adjacent atelectasis. Pulmonary vessels are prominent, similar to prior exam. There are groundglass opacities adjacent to the pulmonary vessels. The heart is enlarged, similar to prior exam. There is a small pericardial effusion, decreased compared to prior exam. Redemonstration of percutaneous cardiac pacer leads and enteric tube. The endotracheal tube is stable with tip in the distal trachea.   There is marked thyromegaly which is nodular in appearance, stable compared to prior exam. Visualized upper abdominal solid organs are grossly unremarkable. There are stable degenerative changes of the thoracic spine with exaggerated thoracic kyphosis. Assessment   -Hemoptysis in the setting of tracheostomy tube-resolved  -Acute hypoxic respiratory failure secondary to severe multi-organism pneumonia with bilateral pleural effusion and repeated mucous plugging   Intubated 10/27/21, extubated 11/9/21, reintubated 11/15/21, tracheostomy tube placed 11/17/21  -S/p bronchoscopy with mucous plug removal from the left mainstem on 11/13/21 by Dr. Viet Cazares in ICU  -Bilateral pleural effusions   -Severe bilateral multiorganism pneumonia: received 14 days of vancomycin, 5 days of rocephin, clinidamycin for 8 days   ANCA associated vasculitis: elevated anti MPO elevated 416 and serine proteinase 3 IgG WNL  -Stage II ALVIN on CKD 3 on HD started 11/20/21  -Hydronephrosis 2/2 Left-sided staghorn calculi  -Acute blood loss anemia: PRBC x1 transfused 10/31/21, 11/4/21, 11/5/21, x2 11/6/21, x3 11/8/21, x3 11/15/21. Heparin stopped (dialysis dose last ran on 11/11/21). -Thrombocytopenia   -Hematuria   -BARBER noncompliant with CPAP     Plan   -Follow pending Bronchoscopy cultures, no endobronchial lesion or active bleeding noted on Bronch 11/22/2021  -Aspiration precautions   -Cautious use of tracheal suction to reduce risk of suction trauma   -Wean FiO2 as tolerated to maintain saturation above 90%, SBT everyday   -Antibiotics per primary   -Will stop Tobramycin and monitor pending cultures  -GI following planning PEG tube placement 11/24/2021    Questions and concerns addressed. Electronically signed by   LANE Baldwin - CNP on 11/23/2021 at 3:03 PM     Addendum by Dr. Moses Hall MD:  I have seen and examined the patient independently. Face to face evaluation and examination was performed. The above evaluation and note has been reviewed.  Labs and radiographs were reviewed. I Have discussed with Mr. Reynold Wyatt CNP about this patient in detail. The above assessment and plan has been reviewed. Please see my modifications mentioned below. My modifications:  No further hemoptysis noted. Her hemoptysis is mostly related to anticoagulation I.e heparin administration during her ICU stay. No current evidence of active bleed. Wean patient off ventilator as tolerated  Dr. Benny Jorgensen MD-nephrology service is following patient regarding positive MPO antibody.   Continue RIOCIGUAT 2.5 mg p.o. every 8 hourly  Continue Stiolto 2 puffs once daily  Continue albuterol nebulization 2.5 mg every 4 hourly as needed    Angelique Pisano MD 11/23/2021 5:48 PM

## 2021-11-23 NOTE — PROGRESS NOTES
[facility]         Renal Inpatient Follow-Up note         11/23/2021 11:02 AM    Pt Name:   Jean Pirere Graff  YOB: 1950  Attending:   Mima Hutchinson MD    Chief Complaint : Jean Pierre Graff is a 73 y.o. female being followed by nephrology for ALVIN/CKD    Interval History :   Patient seen and examined by me. No distress  Patient has a trach now not much communicative cannot assess history or review of systems  Mild Urine output.  Currently on a vent     Scheduled Medications :    tobramycin (PF)  300 mg Nebulization BID    metoclopramide  5 mg IntraVENous Q8H    sodium chloride (Inhalant)  4 mL Nebulization Q6H    And    albuterol  2.5 mg Nebulization Q6H    lactobacillus  1 capsule Per NG tube Daily with breakfast    piperacillin-tazobactam  3,375 mg IntraVENous Q12H    chlorhexidine  15 mL Mouth/Throat BID    ketamine  100 mg IntraVENous Once    lidocaine 1 % injection  5 mL IntraDERmal Once    insulin lispro  0-6 Units SubCUTAneous Q6H    allopurinol  100 mg Oral Daily    phosphorus replacement protocol   Other RX Placeholder    potassium bicarb-citric acid  40 mEq Oral Once    tiotropium-olodaterol  2 puff Inhalation Daily    [Held by provider] aspirin  81 mg Oral Daily    pantoprazole  40 mg IntraVENous QAM    sodium hypochlorite   Irrigation Daily    Riociguat  2.5 mg Oral Q8H    sodium chloride flush  5-40 mL IntraVENous 2 times per day    FLUoxetine  40 mg Oral Daily    [Held by provider] metoprolol tartrate  12.5 mg Oral BID      dextrose 30 mL/hr at 11/23/21 0537    dextrose      sodium chloride Stopped (11/17/21 0709)    [Held by provider] sodium chloride 50 mL/hr at 10/28/21 2314       Vitals :  BP (!) 118/57   Pulse 92   Temp 98 °F (36.7 °C) (Oral)   Resp 20   Ht 4' 9\" (1.448 m)   Wt 170 lb (77.1 kg)   SpO2 97%   BMI 36.79 kg/m²     24HR INTAKE/OUTPUT:      Intake/Output Summary (Last 24 hours) at 11/23/2021 1102  Last data filed at 11/23/2021 0448  Gross per 24 hour   Intake 1494.4 ml   Output 720 ml   Net 774.4 ml     Last 3 weights  Wt Readings from Last 3 Encounters:   11/23/21 170 lb (77.1 kg)   10/25/21 164 lb (74.4 kg)   09/29/21 160 lb (72.6 kg)           Physical Exam :  General Appearance:  Well developed. No distress  Mouth/Throat:  Oral mucosa moist.  Trach in place  Neck:  Supple, no JVD  Lungs:  Breath sounds: clear, diminished  Heart[de-identified]  S1,S2 heard  Abdomen:  Soft, non - tender  Musculoskeletal:  Edema - noted          Last 3 CBC   Recent Labs     11/21/21  0410 11/22/21  0340 11/23/21  0400   WBC 7.2 5.8 4.8   RBC 2.78* 2.83* 2.97*   HGB 8.2* 8.4* 8.7*   HCT 26.9* 26.8* 27.6*   * 130 159     Last 3 CMP  Recent Labs     11/21/21  0410 11/22/21  0340 11/23/21  0400   * 129* 124*   K 3.5 3.7 4.0   CL 96* 93* 88*   CO2 25 23 21*   BUN 31* 39* 54*   CREATININE 2.5* 3.0* 3.5*   CALCIUM 8.2* 8.3* 8.5   LABALBU 2.4* 2.4* 2.7*   BILITOT 0.4 0.4 0.3             ASSESSMENT / Plan   1. Renal -acute kidney injury, multifactorial etiology which includes hypotension from sepsis, IV contrast exposure on 10/29 and has some hydronephrosis as well . ? Patient's creatinine continued to get worse, but primarily fluid status worsened, she was started on CVVH. ? Intradialytic creatinine is rising. Status post IHD 11/20/2021  ? We'll get her dialyzed today  ? Have to consider switching to tunneled dialysis catheter, however the cultures are positive on 11/19/2021     2. Electrolytes - mild hyponatremia adjust with dialysis, this may be due to the D10 . Sugars ok. Will stop it and adjust with HD  3. Mild acidosis corrected with dialysis  4. Acute hypercapnic respiratory failure currently ventilator dependent now with a trach , Pulm on board for hemoptysis  5. Hypotension improved currently off pressors. Continue midodrine  6.  Hydronephrosis status post nephrostomy tube placement still some bloodstained discharge  7. + ANCA, patient has

## 2021-11-23 NOTE — PROGRESS NOTES
HOSPITALIST NOTE    Patient - Tamera Louise   MRN -  [id number]   Thomas # - [de-identified]   - 1950      Date of Admission -  10/27/2021  1:36 PM  Date of evaluation -  2021  [facility] [place]   Hospital Day -   Primary Care Physician - Dhaval Ramos MD   Code Status: FULL CODE    Chief Complaint:    Shortness of breath     History Of Presenting Illness:     71F admitted to [address] [place] [address] 10/27/21 w/ SOB. Current smoker w/ PMH PAH grp 3, HFpEF, stage III sacral ulcer s/p wound ostomy . Patient sister reports SpO2 51% at home and was brought to ED where ABG showed pH 7.19, PCO2 80, PO2 134. She required emergent intubation and was transferred to ICU. Workup revealed left sided pleural effusion and underwent US guided thoracentesis w/ 1100 cc removed consistent w/ MRSA/adenovirus. Patient was noted to be in afib/RVR and was placed on amio, heparin drip. Patient was also noted to have normocytic anemia and received 1 unit PRBC 10/31/21. CT abdomen revealed CBD dilation w/ cholelithiasis.      2021: Plan for nephrostomy tubes today. Hemoglobin 7.7 s/p 1 unit PRBC yesterday. Weaned to 4 mics Levophed. 2021: Patient resumed back on prior dose of vancomycin. Low urine output w/ increasing pressor requirements today  2021: Will attempt SBT if able to wean today. Increased Amio drip 2/2 worsening tachycardia. Continued hematuria w/ low urine output. Cr stable. Will evaluate UTI following perc tube placement. Rise in WBC noted. Culture pending  2021: Cardioversion on 11/3/2021. Now and NSR. CVP 29 this a.m. Suspect drop in CI 2/2 to stopping levophed. Diffuse edema following transfusion overnight. Given one-time dose of 2 mg Bumex and albumin. SBT this AM  2021: Failed SBT yesterday. Continued low urine output. Trial of Bumex today. Will proceed with dialysis if fails to improve.   2021: Started on CRRT, continues to have bloody drainage of nephrostomy tube given 1 unit PRBC, cefipime emperically pending cultures of nephrostomy tube fluid and repeat resp cultures. 11/07/21: Patient remains clinically well on exam. We'll continue medical of UF with CRRT. On empiric cefepime for coverage of perinephric abscess noted on CT abdomen and pelvis November 4, 2021. White count trending down. Will attempt spontaneous breathing trial in a.m.  11/08/21: Transfused 1x PRBC this AM. Anemia workup pending. WBC trending down. Patient ventilator settings this AM preclude SBT. Volume overloaded on exam. UF increased to 125 cc/hr this AM. Will attempt to wean vent settings further further. Coag neg staph growing on nephrostomy tube aspirate. Suspect contamination. Continue existing ABx  11/09/21: Pulse dose steroids initiated overnight. Started on SSI. Urine output now improving 75cc/hr. Transfused 3 units PRBCs yesterday for Improve DO2. MERCEDES noted w/ 875 mg deficit. Will replace once steroids/ABx completed. 11/10/21: Extubated overnight. SLP eval today. Continue BiPAP while asleep w/ transition to NC while awake as tolerated. No tachypnea. Day 3 pulse dose steroids. Continued bloody drainage from nephrostomy tube. Holding [facility]   11/11/21: Worsening bilateral infiltrates with SpO2 <90% on HFNC. Continued pulmonary hygiene w/ nebulized hypertonic saline, acapella and breathing treatments in addition to deep suctioning. Continued on Vancomycin for MRSA PNA. Plan for family meeting this AM to discuss goals of care. 11/12/21: Follow-up discussion from family meeting. Patient nodded agreement with full code status including reintubation leading to tracheostomy and PEG placement and continued hemodialysis if needed. Patient nodded agreement and understanding with these decisions. CXR yesterday evening did show left mainstem mucus plugging with cutoff sign and absent breath sounds and did undergo emergent bronchoscopy overnight on 11/12/21.  Diminished breath sounds and worsening respiratory status this AM concerning for continued plugging. Stat chest X-ray ordered for confirmation. 11/13/21: Respiratory status continues to decline, she is requiring BiPAP with FiO2 of 70%. Chest x-ray shows complete opacification of the left lung secondary to mucous plugging, also the patient has left-sided pleural effusion. Plan for bronchoscopy. 11/15/2021: Family meeting today per event note. Reintubated today after failed BiPAP  11/16/2021: will dc abx tomorrow. PNA panel negative. Plan for tracheostomy time permitting today. Weaning phenylephrine as tolerated. 11/17/2021: Necrotic Stage IV pressure ulcer probing to bone noted this AM. CT abdomen and pelvis pending to evaluate for abscess. Will discuss with family today when available. 11/18/2021: CT showed bony involvement of sacral ulcer w/ concern for osteomyelitis. Wound ostomy following. Culture pending. Started on broad spectrum therapy with Vancomycin and Zosyn for coverage of suspect osteomyelitis. Weaning sedation as tolerated  11/19/21: Wound culture pending. On broad spectrum ABx for coverage of osteomyelitis. Will de-escalate pending culture/sensitiivity. Consult to GI for PEG tube evaluation. 11/21/21: Tolerated conventional HD on 11/20/21 w/ 2500cc removed. Wound culture growing gram negative bacilli. Vanc discontinued. Weaned off phenylephrine. Will monitor overnight. Okay to transfer to step down in AM    Subjective (Last 24 Hours):    Patient laying in bed comfortably this morning with no complaints or concerns on FiO2 30%, rate of 8 L/min. Remains on tube feeds on day 5 Zosyn. Bronchoscopy for hemoptysis unremarkable findings of old bloody residual likely from trach procedure. All other ROS negative.    Medications    Current Medications    tobramycin (PF)  300 mg Nebulization BID    metoclopramide  5 mg IntraVENous Q8H    sodium chloride (Inhalant)  4 mL Nebulization Q6H    And    albuterol  2.5 mg Nebulization Q6H    lactobacillus  1 capsule Per NG tube Daily with breakfast    piperacillin-tazobactam  3,375 mg IntraVENous Q12H    chlorhexidine  15 mL Mouth/Throat BID    ketamine  100 mg IntraVENous Once    lidocaine 1 % injection  5 mL IntraDERmal Once    insulin lispro  0-6 Units SubCUTAneous Q6H    allopurinol  100 mg Oral Daily    phosphorus replacement protocol   Other RX Placeholder    potassium bicarb-citric acid  40 mEq Oral Once    tiotropium-olodaterol  2 puff Inhalation Daily    [Held by provider] aspirin  81 mg Oral Daily    pantoprazole  40 mg IntraVENous QAM    sodium hypochlorite   Irrigation Daily    Riociguat  2.5 mg Oral Q8H    sodium chloride flush  5-40 mL IntraVENous 2 times per day    FLUoxetine  40 mg Oral Daily    [Held by provider] metoprolol tartrate  12.5 mg Oral BID     LORazepam, lidocaine PF, ondansetron, albuterol, fentanNYL, glucose, dextrose, glucagon (rDNA), dextrose, polyethylene glycol, senna, acetaminophen, sodium chloride, sodium chloride flush  IV Drips/Infusions   dextrose 30 mL/hr at 11/22/21 2049    dextrose      sodium chloride Stopped (11/17/21 0709)    [Held by provider] sodium chloride 50 mL/hr at 10/28/21 2314     Diet    Diet NPO  ADULT TUBE FEEDING; Orogastric; Renal Formula; Continuous; 10; Yes; 10; Q 8 hours; 30; 30; Q 4 hours; Protein; 1 ( 2.5 oz) liquid protein daily  Allergies    Patient has no known allergies. Vitals     height is 4' 9\" (1.448 m) and weight is 171 lb (77.6 kg). Her oral temperature is 97.7 °F (36.5 °C). Her blood pressure is 131/61 and her pulse is 92. Her respiration is 17 and oxygen saturation is 97%. Body mass index is 37 kg/m². SUPPLEMENTAL O2: O2 Flow Rate (L/min): 8 L/min   Input/Output     Intake/Output Summary (Last 24 hours) at 11/22/2021 2106  Last data filed at 11/22/2021 2104  Gross per 24 hour   Intake 1420.22 ml   Output 750 ml   Net 670.22 ml     I/O last 3 completed shifts:   In: 1000.2 [I.V.:467.7; NG/GT:485; IV Piggyback:47.5]  Out: 175 [Urine:100; Stool:75]   Patient Vitals for the past 96 hrs (Last 3 readings):   Weight   11/22/21 0421 171 lb (77.6 kg)   11/20/21 1625 174 lb 6.1 oz (79.1 kg)     Physical Exam   General: Acute on chronically ill-appearing elderly white female on trach ventilation   HEENT: Temporal wasting appreciated. Normocephalic and atraumatic. No scleral icterus. PERR  Neck: supple. No Thyromegaly. Left subclavian dialysis catheter   Lungs: Mild diffuse rhonchi appreciated bilaterally No retractions  Cardiac: RRR. No JVD. Abdomen: soft. Nontender,nephrostomy tube with bloody drainage  Extremities: +1 pitting edema x4. (interval improvement noted) No clubbing, cyanosis   Vasculature: capillary refill < 3 seconds. Palpable dorsalis pedis pulses. Skin:  warm and dry. Psych: Alert and oriented to person place and time, affect appropriate  Lymph:  No supraclavicular adenopathy. Neurologic:  No focal deficit. No seizures.   Labs   ABG  Lab Results   Component Value Date    PH 7.36 11/13/2021    PO2 59 11/13/2021    PCO2 45 11/13/2021    HCO3 26 11/13/2021    O2SAT 89 11/13/2021     Lab Results   Component Value Date    IFIO2 70 11/13/2021    MODE PC 11/05/2021    SETPEEP 8.0 11/15/2021     CBC  Recent Labs     11/20/21  0455 11/21/21  0410 11/22/21  0340   WBC 8.7 7.2 5.8   RBC 3.00* 2.78* 2.83*   HGB 8.8* 8.2* 8.4*   HCT 28.6* 26.9* 26.8*   MCV 95.3 96.8 94.7   MCH 29.3 29.5 29.7   MCHC 30.8* 30.5* 31.3*   * 116* 130   MPV 10.1 9.6 9.2*      BMP  Recent Labs     11/20/21  0455 11/21/21  0410 11/22/21  0340   * 131* 129*   K 5.3* 3.5 3.7   CL 97* 96* 93*   CO2 20* 25 23   BUN 55* 31* 39*   CREATININE 3.1* 2.5* 3.0*   GLUCOSE 98 113* 104   MG 2.0 1.9 1.9   PHOS 5.6* 4.1 4.1   CALCIUM 8.2* 8.2* 8.3*     LFT  Recent Labs     11/20/21  0455 11/21/21  0410 11/22/21  0340   AST 16 13 12   ALT 15 14 13   BILITOT 0.8 0.4 0.4   ALKPHOS 117 107 106     TROP  Lab Results   Component Value Date    TROPONINT 0.084 10/28/2021    TROPONINT 0.059 10/27/2021    TROPONINT 0.037 03/06/2020     BNP  No results for input(s): BNP in the last 72 hours. Lactic Acid  No results for input(s): LACTA in the last 72 hours. INR  No results for input(s): INR, PROTIME in the last 72 hours. PTT  No results for input(s): APTT in the last 72 hours. Glucose  Recent Labs     11/22/21  0513 11/22/21  1133 11/22/21  1816   POCGLU 119* 110* 124*     Microbiology     Culture with Smear, Acid Fast Bacillius [[id number]] Collected: 10/27/21 1550   Order Status: Completed Specimen: Body Fluid Updated: 11/22/21 1331    AFB Culture & Smear SEE BELOW   Culture, Anaerobic and Aerobic [[id number]] (Abnormal)  Collected: 11/19/21 1048   Order Status: Completed Specimen: Coccyx Updated: 11/22/21 0601    Aerobic Culture Culture also yielded very light growth of Staphylococcus species (coagulase negative), and additional enteric gram negative bacilli. At least one of drugs in current antibiotic therapy is ineffective in vitro for isolate.  Abnormal     Anaerobic Culture Culture overgrown by swarming Proteus species. No further evaluation of this culture is possible. If a true mixed aerobic and anaerobic infection is suspected, then broad spectrum empiric antibiotic therapy is indicated and should include coverage for anaerobic organisms. Gram Stain Result No segmented neutrophils observed. No epithelial cells observed. No organisms observed.      Organism Proteus mirabilis Abnormal     Aerobic Culture moderate growth     Organism Pseudomonas aeruginosa Abnormal     Aerobic Culture light growth      Procedures   (None recently)    Echocardiogram   (None)    Problem List      Active Hospital Problems    Diagnosis Date Noted    Paroxysmal atrial fibrillation (HCC) [I48.0] 11/22/2021    Hemoptysis [R04.2]     Chronic respiratory failure with hypoxia (HCC) [J96.11]     Aspiration pneumonia of left lung ([address] [J69.0]     Pleural effusion [J90]     Shock ([facility] [R57.9]     Flash pulmonary edema ([address] [J81.0]     Acute respiratory failure ([address] [J96.00] 10/27/2021    Pressure ulcer of right buttock, stage 3 ([address] [L89.313] 07/29/2021    Pulmonary HTN ([facility] [I27.20] 09/03/2019      Assessment & Plan:   1. Subacute combined hypercapnic and hypoxic respiratory failure: D/t to pneumonia with L-sided pleural effusion and repeated mucous plugging. Intubated 10/27/21. Extubated following successful SBT on 11/9/21. Failed HFNC and was reintubated on 11/15/21. Tracheostomy placed 11/17/21.    - Wean as tolerated   - Lung protective strategies    - Aspiration precautions . 2. 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 Probes to bone, necrosis noted around wound site. CT abdomen and pelvis showing possible mi involvement with concern for osteomyelitis. Seen by general surgery on 11/17/21. No indications for debridement at the time. Poor candidate for surgery given multiple co-morbidities, malnutrition and poor wound healing.    - On day 5 Zosyn    - Continue Dakins w/ dressing changes   - Culture growing gram negative bacilli   - Vanc stopped 11/21/21   3. Stage III ALVIN on CKD 3: Suspect post renal etiology 2/2 staghorn calculi. Nephrology following. CRRT 11/5/21 until 11/12/21. Anti-DS-DNA negative, Anti histone negative, Anti Smith,and  Anti-Savana WNL, GBM antibodies negative, C3/C4 levels normal, elevated kappa/lambda free light chains with normal ratio. HD started 11/20/21   - Positive for ANCA associated vasculitis   - ANCA associated Abs show elevated anti MPO elevated 416 and serine proteinase 3 IgG WNL   - Plans for biopsy per nephrology when stable   - CVVH stopped 11/15/21 significant oliguria since that time   - No acute need for dialysis today reevaluate tomorrow  4.  Hemoptysis (resolved): Bronchoscopy on 11/22 remarkable for old blood stained mucus in RLL. No active bleed. - Pulm following, appreciate recs     - Jose Alfredo 300 mg by nebulization twice daily   - SCDs bilaterally, hold AC  5. Acute blood loss anemia with chronic normocytic anemia: 2/2 hematuria s/p perc tube placement w/ Iron studies consistent w/ MERCEDES vs Anemia chronic disease. B12 WNL, Folate low, Abs reticulocyte index 11/18/21 0.49% Soluable transferrin 139. Calculated Iron deficit 827mg. PRBC x1 transfused 10/31/21, 11/4/21, 11/5/21, x2 11/6/21, x3 11/8/21, x3 11/15/21.    - Heparin stopped (dialysis dose still running)   - Continue holding ASA    - Venofer replace folate once ABx stopped  6. Hematuria: s/p perc tube placement. CBI per above. Stopped heparin, holding ASA. 7. Dysphagia: 2/2 prolonged intubation.    - NPO per SLP   - GI to place PEG tube later this week. 8. ANCA associated Vasculitis: workup per above. Will need Renal Bx for confirmation once stable. Discussed case w/ Nephrology. Completed pulse dose steroids starting on 11/8/21 w/ 10mg/kg methylprednisolone for 3 days. SSI started for coverage. Not candidate for Rituxin/TPE 2/2 existing infection. 9. Bilateral Pleural Effusion:   Associated volume overload in context of renal failure. Interval enlargement noted on CT 11/1/21. Currently on CVVH for effusions. Plan for chest tube insertion if no improvement  10. Suspected COPD: current every day smoker. Obstructive process noted on flow volume loop on ventilator. Started empiric therapy with Stiolto. Recommend formal PFT once improved  11. PAH/chronic RV failure NYHA II: EF 55 to 60% G2 DD TTE 5/4/2021. RHC showed [place] [address] with elevated PCWP. Treated w/ Riociguat. 12. Chronic tobacco use:  cessation  13. Gout: Continue allopurinol  14. BARBER: non compliant w/ CPAP   15. Septic Shock: Resolved. 2/2 severe PNA now concern for necrotizing wound on sacrum.  Initial CI 6.3 w/ NE precludes concern for septic shock NE d/c 2/2 worsening afib RVR  16. Thrombocytopenia: Resolved. Suspect consumptive process in context of sepsis. Not on heparin    17. Severe bilateral multiorganism pneumonia: Resolved. PCR positive Staph w/ MECA gene and adenovirus consistent w/ MRSA colonization. Staph completed 14 days Vancomycin (started 10/28/21) and completed 5 days Rocephin. Clindamycin started for coverage of PVL as patient clinically worsening following 14 days Vancomycin for MRSA coverage. Underwent bronchoscopy for left mainstem mucus plug noted CXR on 11/11/21. Switched to Clindamycin for coverage of PVL received 8 days therapy (started 11/11/21). PNA panel from 11/15/21 negative.  Repeat cultures show no growth    PT/OT Eval Status: Daily PT    VTE prophylaxis: [] Lovenox                                 [x] SCDs                                 [] SQ Heparin                                 [] Encourage ambulation           [] Already on Anticoagulation     IV Fluids: D10% rate of 30 mL/hr   Diet:  Tube feeds   Code Status: Full Code  Disposition: [place] once more stable     Tele:   [x] yes             [] no    Electronically signed by   Brissa Goff DO on 11/22/2021 at 9:06 PM

## 2021-11-23 NOTE — FLOWSHEET NOTE
11/23/21 1456 11/23/21 1815   Vital Signs   /60 (!) 134/57   Temp 98.1 °F (36.7 °C) 97.9 °F (36.6 °C)   Pulse 96 100   Resp  --  18   SpO2 98 %  --    Weight 171 lb 4.8 oz (77.7 kg) 165 lb 12.6 oz (75.2 kg)   Weight Method  --  Bed scale   Post-Hemodialysis Assessment   Post-Treatment Procedures  --  Blood returned; Catheter Capped, clamped with Saline x2 ports   Machine Disinfection Process  --  Acid/Vinegar Clean; Heat Disinfect; Exterior Machine Disinfection   Total Liters Processed (l/min)  --  44.2 l/min   Dialyzer Clearance  --  Lightly streaked   Duration of Treatment (minutes)  --  159 minutes   Heparin amount administered during treatment (units)  --  0 units   Hemodialysis Intake (ml)  --  500 ml   Hemodialysis Output (ml)  --  2200 ml   NET Removed (ml)  --  1700 ml   Tolerated Treatment  --  Good   TX alarming high venous pressure with lines reversed. BFR reduced to 200 and system flushed 200 ml with no clotting noted in system. Lines reversed back with out success. Poor push and pull on arterial lines. TX paused and blood returned. 51 min remaining in [address] [place] [address]. Dr. Dina Corrales notified and received orders to stop tx and notify Dr. Alona Becker in morning for possible activase orders. 2 hours and 39 min of tx completed. Removed 1.7 L of fluid. pt tolerated fluid removal well. Bilateral cath ports flushed with normal saline and clamped. CVC drsg clean, dry, and intact. Report called to primary RN. TX record printed for scanning into EMR.

## 2021-11-23 NOTE — CARE COORDINATION
Discharge Planning Update:    Planning peg tomorrow at 1430. Capo able to place HD cath. If needed. Plan [facility] tomorrow if bed available.

## 2021-11-23 NOTE — PROGRESS NOTES
Gastroenterology Progress Note:     Patient Name:  Lorelei Corado   MRN: [id number]  [id number]  YOB: 1950  Admit Date: 10/27/2021  1:36 PM  Primary Care Physician: Lashawn Rodriguez MD   [id number]     Patient seen and examined. 24 hours events and chart reviewed. Subjective: Patient resting in bed, family present. Denies abdominal pain, nausea, & vomiting. She continues to tolerate TF. Bronchoscopy results reviewed. EGD with PEG placement discussed along with risks & benefits, patient & family agreeable to proceed. Per family, plan is for patient to go to [facility].    Objective:  BP (!) 118/57   Pulse 92   Temp 98 °F (36.7 °C) (Oral)   Resp 20   Ht 4' 9\" (1.448 m)   Wt 170 lb (77.1 kg)   SpO2 97%   BMI 36.79 kg/m²     Physical Exam:    General:  Acute on chronically ill appearing female  HEENT: Atraumatic, normocephalic. Dry oral mucous membranes. Dobhoff tube in place. Neck: Supple without adenopathy, JVD, thyromegaly or masses. Trachea midline. CV: Heart RRR, no murmurs, rubs, gallops. Resp: Even, easy without cough or accessory use. Lungs clear to ascultation bilaterally. Abd: Round, soft, nontender. No hepatosplenomegaly or mass present. Active bowel sounds heard. No distention noted. Ext:  Without cyanosis, clubbing. BUE & BLE edema. Skin: Pink, warm, dry  Neuro:  Alert, oriented x 3 with no obvious deficits.        Rectal: deferred    Labs:   CBC:   Lab Results   Component Value Date    WBC 4.8 11/23/2021    HGB 8.7 11/23/2021    HCT 27.6 11/23/2021    MCV 92.9 11/23/2021     11/23/2021     BMP:   Lab Results   Component Value Date     11/23/2021    K 4.0 11/23/2021    K 4.8 11/17/2021    CL 88 11/23/2021    CO2 21 11/23/2021    PHOS 4.3 11/23/2021    BUN 54 11/23/2021    CREATININE 3.5 11/23/2021    CALCIUM 8.5 11/23/2021     PT/INR:   Lab Results   Component Value Date    INR 1.02 11/18/2021     Lipids:   Lab Results   Component Value Date    ALKPHOS 115 11/23/2021    ALT 13 11/23/2021    AST 11 11/23/2021    BILITOT 0.3 11/23/2021    BILIDIR 0.3 03/06/2020    LABALBU 2.7 11/23/2021     Significant Diagnostic Studies:   Bronch 11/22/21    Current Meds:  Scheduled Meds:   tobramycin (PF)  300 mg Nebulization BID    metoclopramide  5 mg IntraVENous Q8H    sodium chloride (Inhalant)  4 mL Nebulization Q6H    And    albuterol  2.5 mg Nebulization Q6H    lactobacillus  1 capsule Per NG tube Daily with breakfast    piperacillin-tazobactam  3,375 mg IntraVENous Q12H    chlorhexidine  15 mL Mouth/Throat BID    ketamine  100 mg IntraVENous Once    lidocaine 1 % injection  5 mL IntraDERmal Once    insulin lispro  0-6 Units SubCUTAneous Q6H    allopurinol  100 mg Oral Daily    phosphorus replacement protocol   Other RX Placeholder    potassium bicarb-citric acid  40 mEq Oral Once    tiotropium-olodaterol  2 puff Inhalation Daily    [Held by provider] aspirin  81 mg Oral Daily    pantoprazole  40 mg IntraVENous QAM    sodium hypochlorite   Irrigation Daily    Riociguat  2.5 mg Oral Q8H    sodium chloride flush  5-40 mL IntraVENous 2 times per day    FLUoxetine  40 mg Oral Daily    [Held by provider] metoprolol tartrate  12.5 mg Oral BID     Continuous Infusions:   dextrose 30 mL/hr at 11/23/21 0537    dextrose      sodium chloride Stopped (11/17/21 0709)    [Held by provider] sodium chloride 50 mL/hr at 10/28/21 2314       Assessment:  72 yo F, GI consulted for PEG tube placement. Admitted 10/27/21 for acute hypoxic hypercapnic respiratory failure 2/2 pneumonia. Tracheostomy placement 11/17. S/P bronch with mucous plug removal 11/15. Developed septic shock. Has stage IV decubitus ulcer with suspected osteomyelitis, GenSurg consulted without indications for debridement 11/17, on Vancomycin and Zosyn. Noted to have ALVIN on CKD3, has required dialysis on and off.  We saw on 10/31 for concern of dilated CBD without evidence of choledocholithiasis, rec consider HIDA once stable. Reconsult 11/19/21 for consideration of PEG placement as no longer tolerating NGT which was removed and Dobbhoff placed 11/19 with continued N/V with any use of tube. Lidocaine aerosol has helped.       1. Acute respiratory failure S/P tracheostomy placement 11/17/21  2. Malnutrition  3. N/V  4. Pneumonia  5. Septic Shock- resolved  6. Stage IV decubitus ulcer with suspected osteomyelitis, not a surgical candidate- on ATBs  7. ALVIN on CKD3- s/p CRRT and intermittent CVVH which was held 11/15  8. ANCA associated vasculitis  9. Hydronephrosis  10. Anemia- without s/s of active GI bleeding- stable  11. Thrombocytopenia- resolved  12. Cholelithiasis  13. Chronic cigarette nicotine dependence  14. Hemoptysis noted with suctioning 11/22/21 s/p bronch 11/22/21 with no active bleeding       Plan:    · Monitor H & H, transfuse prn  · TF per dietary recs  · Continue Reglan  · Nursing to monitor stool output & document  · ATBs per primary  · Continue to hold anticoagulation   · Discussed with Dr. Ritesh Oh, nephrology, who has given clearance for EGD with PEG placement  · EGD with PEG placement discussed along with risks & benefits, patient & family agreeable to proceed.  Per family, plan is for patient to go to [facility].  · Telephone consent received from Claraiam Hernándezel, patient's sister & POA, with RN Clive Obrien as witness  · Abdominal binder to be in place post PEG  · EGD with PEG placement 11/24/21 at 1430 with Dr. Edward Henson  · Nephrology, urology, & pulmonology on board  · RN updated  · Supportive care per primary team  Will follow    Case reviewed and impression/plan reviewed in collaboration with Dr. Edward Henson  Electronically signed by LANE Bui CNP on 11/23/2021 at 11:03 AM    [facility]

## 2021-11-24 ENCOUNTER — ANESTHESIA (OUTPATIENT)
Dept: ENDOSCOPY | Age: 71
DRG: 004 | End: 2021-11-24
Payer: MEDICARE

## 2021-11-24 ENCOUNTER — ANESTHESIA EVENT (OUTPATIENT)
Dept: ENDOSCOPY | Age: 71
DRG: 004 | End: 2021-11-24
Payer: MEDICARE

## 2021-11-24 VITALS — SYSTOLIC BLOOD PRESSURE: 139 MMHG | DIASTOLIC BLOOD PRESSURE: 65 MMHG | OXYGEN SATURATION: 97 %

## 2021-11-24 LAB
ANION GAP SERPL CALCULATED.3IONS-SCNC: 14 MEQ/L (ref 8–16)
BUN BLDV-MCNC: 34 MG/DL (ref 7–22)
CALCIUM IONIZED: 1.24 MMOL/L (ref 1.12–1.32)
CALCIUM SERPL-MCNC: 8.5 MG/DL (ref 8.5–10.5)
CHLORIDE BLD-SCNC: 96 MEQ/L (ref 98–111)
CO2: 23 MEQ/L (ref 23–33)
CREAT SERPL-MCNC: 2.7 MG/DL (ref 0.4–1.2)
GFR SERPL CREATININE-BSD FRML MDRD: 17 ML/MIN/1.73M2
GLUCOSE BLD-MCNC: 105 MG/DL (ref 70–108)
GLUCOSE BLD-MCNC: 78 MG/DL (ref 70–108)
GLUCOSE BLD-MCNC: 82 MG/DL (ref 70–108)
GLUCOSE BLD-MCNC: 86 MG/DL (ref 70–108)
GLUCOSE BLD-MCNC: 87 MG/DL (ref 70–108)
GLUCOSE BLD-MCNC: 99 MG/DL (ref 70–108)
POTASSIUM SERPL-SCNC: 3.4 MEQ/L (ref 3.5–5.2)
SODIUM BLD-SCNC: 133 MEQ/L (ref 135–145)

## 2021-11-24 PROCEDURE — 3700000001 HC ADD 15 MINUTES (ANESTHESIA): Performed by: INTERNAL MEDICINE

## 2021-11-24 PROCEDURE — 3700000000 HC ANESTHESIA ATTENDED CARE: Performed by: INTERNAL MEDICINE

## 2021-11-24 PROCEDURE — 2580000003 HC RX 258: Performed by: INTERNAL MEDICINE

## 2021-11-24 PROCEDURE — 2580000003 HC RX 258: Performed by: NURSE ANESTHETIST, CERTIFIED REGISTERED

## 2021-11-24 PROCEDURE — 6360000002 HC RX W HCPCS: Performed by: PHARMACIST

## 2021-11-24 PROCEDURE — 7100000001 HC PACU RECOVERY - ADDTL 15 MIN: Performed by: INTERNAL MEDICINE

## 2021-11-24 PROCEDURE — 82330 ASSAY OF CALCIUM: CPT

## 2021-11-24 PROCEDURE — 2580000003 HC RX 258: Performed by: STUDENT IN AN ORGANIZED HEALTH CARE EDUCATION/TRAINING PROGRAM

## 2021-11-24 PROCEDURE — 0DH63UZ INSERTION OF FEEDING DEVICE INTO STOMACH, PERCUTANEOUS APPROACH: ICD-10-PCS | Performed by: INTERNAL MEDICINE

## 2021-11-24 PROCEDURE — 6360000002 HC RX W HCPCS: Performed by: INTERNAL MEDICINE

## 2021-11-24 PROCEDURE — 6360000002 HC RX W HCPCS: Performed by: STUDENT IN AN ORGANIZED HEALTH CARE EDUCATION/TRAINING PROGRAM

## 2021-11-24 PROCEDURE — 99232 SBSQ HOSP IP/OBS MODERATE 35: CPT | Performed by: INTERNAL MEDICINE

## 2021-11-24 PROCEDURE — 3609013300 HC EGD TUBE PLACEMENT: Performed by: INTERNAL MEDICINE

## 2021-11-24 PROCEDURE — 2580000003 HC RX 258: Performed by: EMERGENCY MEDICINE

## 2021-11-24 PROCEDURE — 6370000000 HC RX 637 (ALT 250 FOR IP): Performed by: FAMILY MEDICINE

## 2021-11-24 PROCEDURE — 99233 SBSQ HOSP IP/OBS HIGH 50: CPT | Performed by: INTERNAL MEDICINE

## 2021-11-24 PROCEDURE — 82948 REAGENT STRIP/BLOOD GLUCOSE: CPT

## 2021-11-24 PROCEDURE — C9113 INJ PANTOPRAZOLE SODIUM, VIA: HCPCS | Performed by: NURSE PRACTITIONER

## 2021-11-24 PROCEDURE — 94003 VENT MGMT INPAT SUBQ DAY: CPT

## 2021-11-24 PROCEDURE — 2060000000 HC ICU INTERMEDIATE R&B

## 2021-11-24 PROCEDURE — 6360000002 HC RX W HCPCS: Performed by: NURSE PRACTITIONER

## 2021-11-24 PROCEDURE — 2700000000 HC OXYGEN THERAPY PER DAY

## 2021-11-24 PROCEDURE — 6370000000 HC RX 637 (ALT 250 FOR IP): Performed by: STUDENT IN AN ORGANIZED HEALTH CARE EDUCATION/TRAINING PROGRAM

## 2021-11-24 PROCEDURE — 94640 AIRWAY INHALATION TREATMENT: CPT

## 2021-11-24 PROCEDURE — APPSS30 APP SPLIT SHARED TIME 16-30 MINUTES: Performed by: NURSE PRACTITIONER

## 2021-11-24 PROCEDURE — 94761 N-INVAS EAR/PLS OXIMETRY MLT: CPT

## 2021-11-24 PROCEDURE — 6370000000 HC RX 637 (ALT 250 FOR IP): Performed by: NURSE PRACTITIONER

## 2021-11-24 PROCEDURE — 6370000000 HC RX 637 (ALT 250 FOR IP): Performed by: INTERNAL MEDICINE

## 2021-11-24 PROCEDURE — 80048 BASIC METABOLIC PNL TOTAL CA: CPT

## 2021-11-24 PROCEDURE — 36415 COLL VENOUS BLD VENIPUNCTURE: CPT

## 2021-11-24 PROCEDURE — 7100000000 HC PACU RECOVERY - FIRST 15 MIN: Performed by: INTERNAL MEDICINE

## 2021-11-24 RX ORDER — POTASSIUM CHLORIDE 29.8 MG/ML
40 INJECTION INTRAVENOUS ONCE
Status: COMPLETED | OUTPATIENT
Start: 2021-11-24 | End: 2021-11-24

## 2021-11-24 RX ORDER — SODIUM CHLORIDE 9 MG/ML
INJECTION, SOLUTION INTRAVENOUS CONTINUOUS PRN
Status: DISCONTINUED | OUTPATIENT
Start: 2021-11-24 | End: 2021-11-24 | Stop reason: SDUPTHER

## 2021-11-24 RX ORDER — SODIUM CHLORIDE 9 MG/ML
25 INJECTION, SOLUTION INTRAVENOUS PRN
Status: DISCONTINUED | OUTPATIENT
Start: 2021-11-24 | End: 2021-12-01 | Stop reason: HOSPADM

## 2021-11-24 RX ORDER — SODIUM CHLORIDE 0.9 % (FLUSH) 0.9 %
5-40 SYRINGE (ML) INJECTION PRN
Status: DISCONTINUED | OUTPATIENT
Start: 2021-11-24 | End: 2021-12-01 | Stop reason: HOSPADM

## 2021-11-24 RX ORDER — SODIUM CHLORIDE 0.9 % (FLUSH) 0.9 %
5-40 SYRINGE (ML) INJECTION EVERY 12 HOURS SCHEDULED
Status: DISCONTINUED | OUTPATIENT
Start: 2021-11-24 | End: 2021-12-01 | Stop reason: HOSPADM

## 2021-11-24 RX ADMIN — METOCLOPRAMIDE HYDROCHLORIDE 5 MG: 5 INJECTION INTRAMUSCULAR; INTRAVENOUS at 18:35

## 2021-11-24 RX ADMIN — ALTEPLASE 2 MG: 2.2 INJECTION, POWDER, LYOPHILIZED, FOR SOLUTION INTRAVENOUS at 11:00

## 2021-11-24 RX ADMIN — SODIUM CHLORIDE SOLN NEBU 3% 4 ML: 3 NEBU SOLN at 00:34

## 2021-11-24 RX ADMIN — FLUOXETINE 40 MG: 20 CAPSULE ORAL at 09:48

## 2021-11-24 RX ADMIN — SODIUM CHLORIDE: 9 INJECTION, SOLUTION INTRAVENOUS at 14:42

## 2021-11-24 RX ADMIN — METOCLOPRAMIDE HYDROCHLORIDE 5 MG: 5 INJECTION INTRAMUSCULAR; INTRAVENOUS at 09:48

## 2021-11-24 RX ADMIN — PIPERACILLIN AND TAZOBACTAM 3375 MG: 3; .375 INJECTION, POWDER, LYOPHILIZED, FOR SOLUTION INTRAVENOUS at 21:33

## 2021-11-24 RX ADMIN — ALBUTEROL SULFATE 2.5 MG: 2.5 SOLUTION RESPIRATORY (INHALATION) at 12:56

## 2021-11-24 RX ADMIN — Medication 1 CAPSULE: at 09:48

## 2021-11-24 RX ADMIN — METOCLOPRAMIDE HYDROCHLORIDE 5 MG: 5 INJECTION INTRAMUSCULAR; INTRAVENOUS at 00:18

## 2021-11-24 RX ADMIN — SODIUM CHLORIDE, PRESERVATIVE FREE 10 ML: 5 INJECTION INTRAVENOUS at 09:48

## 2021-11-24 RX ADMIN — SODIUM HYPOCHLORITE: 1.25 SOLUTION TOPICAL at 09:55

## 2021-11-24 RX ADMIN — ALBUTEROL SULFATE 2.5 MG: 2.5 SOLUTION RESPIRATORY (INHALATION) at 16:51

## 2021-11-24 RX ADMIN — ALLOPURINOL 100 MG: 100 TABLET ORAL at 09:49

## 2021-11-24 RX ADMIN — CHLORHEXIDINE GLUCONATE 15 ML: 1.2 RINSE ORAL at 09:49

## 2021-11-24 RX ADMIN — PIPERACILLIN AND TAZOBACTAM 3375 MG: 3; .375 INJECTION, POWDER, LYOPHILIZED, FOR SOLUTION INTRAVENOUS at 10:07

## 2021-11-24 RX ADMIN — PANTOPRAZOLE SODIUM 40 MG: 40 INJECTION, POWDER, FOR SOLUTION INTRAVENOUS at 09:48

## 2021-11-24 RX ADMIN — POTASSIUM CHLORIDE 20 MEQ: 400 INJECTION, SOLUTION INTRAVENOUS at 12:43

## 2021-11-24 RX ADMIN — CHLORHEXIDINE GLUCONATE 15 ML: 1.2 RINSE ORAL at 20:49

## 2021-11-24 RX ADMIN — ALBUTEROL SULFATE 2.5 MG: 2.5 SOLUTION RESPIRATORY (INHALATION) at 00:34

## 2021-11-24 RX ADMIN — SODIUM CHLORIDE, PRESERVATIVE FREE 10 ML: 5 INJECTION INTRAVENOUS at 20:49

## 2021-11-24 RX ADMIN — POTASSIUM CHLORIDE 20 MEQ: 400 INJECTION, SOLUTION INTRAVENOUS at 11:53

## 2021-11-24 RX ADMIN — TIOTROPIUM BROMIDE AND OLODATEROL 2 PUFF: 3.124; 2.736 SPRAY, METERED RESPIRATORY (INHALATION) at 06:48

## 2021-11-24 RX ADMIN — DEXTROSE MONOHYDRATE 100 ML/HR: 50 INJECTION, SOLUTION INTRAVENOUS at 18:34

## 2021-11-24 ASSESSMENT — PULMONARY FUNCTION TESTS
PIF_VALUE: 0
PIF_VALUE: 21
PIF_VALUE: 21
PIF_VALUE: 3
PIF_VALUE: 0
PIF_VALUE: 0
PIF_VALUE: 22
PIF_VALUE: 21
PIF_VALUE: 0
PIF_VALUE: 21
PIF_VALUE: 18
PIF_VALUE: 3
PIF_VALUE: 5
PIF_VALUE: 18
PIF_VALUE: 21
PIF_VALUE: 3
PIF_VALUE: 2
PIF_VALUE: 18
PIF_VALUE: 22
PIF_VALUE: 0
PIF_VALUE: 21
PIF_VALUE: 22
PIF_VALUE: 3
PIF_VALUE: 0
PIF_VALUE: 17
PIF_VALUE: 18
PIF_VALUE: 21
PIF_VALUE: 21
PIF_VALUE: 1
PIF_VALUE: 21
PIF_VALUE: 23

## 2021-11-24 ASSESSMENT — PAIN - FUNCTIONAL ASSESSMENT: PAIN_FUNCTIONAL_ASSESSMENT: 0-10

## 2021-11-24 ASSESSMENT — PAIN SCALES - GENERAL
PAINLEVEL_OUTOF10: 0

## 2021-11-24 NOTE — PROCEDURES
[address] [address]  EGD/PEG Tube placement    Pt Name: Hermilo Zheng  MRN: [id number]  YOB: 1950                [address] [address] Endoscopy  PEG       Patient: Hermilo Zheng  : 1950  Acct#: [de-identified]  PROCEDURE PERFORMED:  PEG tube placement. MEDICATIONS GIVEN: TIVA anesthesia was used, see Anesthesia note for details. Zosyn IV given in hospital.    INDICATIONS FOR PROCEDURE: 73 y.o. , female with oropharyngeal dysphagia, and vent dependent. Endoscopist:  Evon Barber MD    DESCRIPTION OF PROCEDURE: After obtaining informed consent from patient's sister, Matt Calle, the upper endoscope was placed into the mouth and advanced under direct visualization to the second portion of the duodenum. Airway was assessed and is adequate for conscious sedation. The patient was placed in the supine position. The patient was continuously monitored to ensure adequate sedation and patient   safety. FINDINGS:   A forward-viewing Olympus endoscope was lubricated and inserted through the mouth into the oropharynx. Under direct visualization, the upper esophagus was intubated. The scope was advanced to the esophagus and stomach to second portion of duodenum. Scope was slowly withdrawn with good views of mucosal surfaces. The scope was retroflexed in the fundus. After transillumination and indentation of the gastric wall by external finger pressure was demonstrated, the site was prepped and draped in a sterile manner. Chlorhexidine was used to sterilize the skin. A seeker needle was used, and 2ml of lidocaine 2% was injected around the PEG site. Using a surgical blade, a small incision of the abdominal wall was made at the PEG site. A 25-guage needle with cannula was inserted through the abdominal wall and into the gastric lumen. A guidewire was passed through the cannula and grasped with a snare and removed through the mouth.   Next, a 20 Surinamese PEG tube was secured to the guidewire and pulled through the abdominal wall. A satisfactory final position was confirmed endoscopically. The postprocedure appearance was satisfactory. The external bumper position is at  3 cm at the skin. COMPLICATIONS: The patient tolerated the procedure well. There were no immediate complications. EBL : < 10 mL    SUMMARY:  1. Successful placement of a 20 Occitan PEG tube at 3 cm at the skin. 2. Normal endoscopic exam to the second portion of the duodenum. RECOMMENDATIONS:  1. PEG orders written. 2. Change dressings under the anchor daily x 3 days. 3. Avoid Lovenox/anticoagulation for the next 48 hours. 4. Begin Protonix  daily. 5. Consult Nutrition for Enteral feeding prescription, may begin using PEG  At 1900 for nutrition. 6. May begin using PEG for medications and flushing at 1600.  7. Consult Pharmacy for medication compatibility. 8. GI to see tomorrow AM for PEG check. 9. Abdominal Binder.        Specimens: were not obtained    Tatyana Vaz MD, [facility]

## 2021-11-24 NOTE — PROGRESS NOTES
1531-pt received to pacu, awake, resp easy, unlabored. 1549- pt denies pain, resp easy, unlabored. Pt resting in bed w eyes closed. 1609-pt meets criteria for discharge form pacu. report to [facility] on 4k. Request for RT and transport to take patient back to IP unit.       1630- transport here to take pt to floor

## 2021-11-24 NOTE — PROGRESS NOTES
HOSPITALIST PROGRESS NOTE    Patient - Miguel Ángel Chau   MRN -  [id number]   Thmoas # - [de-identified]   - 1950      Date of Admission -  10/27/2021  1:36 PM  Date of evaluation -  2021  Room - -[facility]   Hospital Day - 28  Primary Care Physician - Dalia Esquivel MD   Code Status: FULL CODE    Chief Complaint:    Shortness of breath     History Of Presenting Illness:     71F admitted to [facility] 10/27/21 w/ SOB. Current smoker w/ PMH PAH grp 3, HFpEF, stage III sacral ulcer s/p wound ostomy . Patient sister reports SpO2 51% at home and was brought to ED where ABG showed pH 7.19, PCO2 80, PO2 134. She required emergent intubation and was transferred to ICU. Workup revealed left sided pleural effusion and underwent US guided thoracentesis w/ 1100 cc removed consistent w/ MRSA/adenovirus. Patient was noted to be in afib/RVR and was placed on amio, heparin drip. Patient was also noted to have normocytic anemia and received 1 unit PRBC 10/31/21. CT abdomen revealed CBD dilation w/ cholelithiasis.      2021: Plan for nephrostomy tubes today. Hemoglobin 7.7 s/p 1 unit PRBC yesterday. Weaned to 4 mics Levophed. 2021: Patient resumed back on prior dose of vancomycin. Low urine output w/ increasing pressor requirements today  2021: Will attempt SBT if able to wean today. Increased Amio drip 2/2 worsening tachycardia. Continued hematuria w/ low urine output. Cr stable. Will evaluate UTI following perc tube placement. Rise in WBC noted. Culture pending  2021: Cardioversion on 11/3/2021. Now and NSR. CVP 29 this a.m. Suspect drop in CI 2/2 to stopping levophed. Diffuse edema following transfusion overnight. Given one-time dose of 2 mg Bumex and albumin. SBT this AM  2021: Failed SBT yesterday. Continued low urine output. Trial of Bumex today. Will proceed with dialysis if fails to improve.   2021: Started on CRRT, continues to have bloody drainage of nephrostomy tube given 1 unit PRBC, cefipime emperically pending cultures of nephrostomy tube fluid and repeat resp cultures. 11/07/21: Patient remains clinically well on exam. We'll continue medical of UF with CRRT. On empiric cefepime for coverage of perinephric abscess noted on CT abdomen and pelvis November 4, 2021. White count trending down. Will attempt spontaneous breathing trial in a.m.  11/08/21: Transfused 1x PRBC this AM. Anemia workup pending. WBC trending down. Patient ventilator settings this AM preclude SBT. Volume overloaded on exam. UF increased to 125 cc/hr this AM. Will attempt to wean vent settings further further. Coag neg staph growing on nephrostomy tube aspirate. Suspect contamination. Continue existing ABx  11/09/21: Pulse dose steroids initiated overnight. Started on SSI. Urine output now improving 75cc/hr. Transfused 3 units PRBCs yesterday for Improve DO2. MERCEDES noted w/ 875 mg deficit. Will replace once steroids/ABx completed. 11/10/21: Extubated overnight. SLP eval today. Continue BiPAP while asleep w/ transition to NC while awake as tolerated. No tachypnea. Day 3 pulse dose steroids. Continued bloody drainage from nephrostomy tube. Holding [facility]   11/11/21: Worsening bilateral infiltrates with SpO2 <90% on HFNC. Continued pulmonary hygiene w/ nebulized hypertonic saline, acapella and breathing treatments in addition to deep suctioning. Continued on Vancomycin for MRSA PNA. Plan for family meeting this AM to discuss goals of care. 11/12/21: Follow-up discussion from family meeting. Patient nodded agreement with full code status including reintubation leading to tracheostomy and PEG placement and continued hemodialysis if needed. Patient nodded agreement and understanding with these decisions. CXR yesterday evening did show left mainstem mucus plugging with cutoff sign and absent breath sounds and did undergo emergent bronchoscopy overnight on 11/12/21.  Diminished breath sounds and worsening respiratory status this AM concerning for continued plugging. Stat chest X-ray ordered for confirmation. 11/13/21: Respiratory status continues to decline, she is requiring BiPAP with FiO2 of 70%. Chest x-ray shows complete opacification of the left lung secondary to mucous plugging, also the patient has left-sided pleural effusion. Plan for bronchoscopy. 11/15/2021: Family meeting today per event note. Reintubated today after failed BiPAP  11/16/2021: will dc abx tomorrow. PNA panel negative. Plan for tracheostomy time permitting today. Weaning phenylephrine as tolerated. 11/17/2021: Necrotic Stage IV pressure ulcer probing to bone noted this AM. CT abdomen and pelvis pending to evaluate for abscess. Will discuss with family today when available. 11/18/2021: CT showed bony involvement of sacral ulcer w/ concern for osteomyelitis. Wound ostomy following. Culture pending. Started on broad spectrum therapy with Vancomycin and Zosyn for coverage of suspect osteomyelitis. Weaning sedation as tolerated  11/19/21: Wound culture pending. On broad spectrum ABx for coverage of osteomyelitis. Will de-escalate pending culture/sensitiivity. Consult to GI for PEG tube evaluation. 11/21/21: Tolerated conventional HD on 11/20/21 w/ 2500cc removed. Wound culture growing gram negative bacilli. Vanc discontinued. Weaned off phenylephrine. Will monitor overnight. Okay to transfer to step down in AM  11/22/21: FiO2 30%, rate of 8 L/min. Remains on tube feeds on day 5 Zosyn. Bronchoscopy for hemoptysis unremarkable findings of old bloody residual likely from trach procedure. 11/23/21: Remains on tube feeds on day 6 Zosyn. Creatinine bumped up to 3.5 today with Na low at 124. Nephrology performed HD today with tunneled cath next. GI planning for PEG tube placement tomorrow. 11/24/21: See below  Subjective (Last 24 Hours):    Patient laying in bed comfortably this morning with no complaints or concerns on minimal trach vent settings. 7-day Zosyn completed. PEG tube placed by GI. Otherwise well. All other ROS negative. Medications    Current Medications    sterile water  4.4 mL Injection Once in dialysis    metoclopramide  5 mg IntraVENous Q8H    sodium chloride (Inhalant)  4 mL Nebulization Q6H    And    albuterol  2.5 mg Nebulization Q6H    lactobacillus  1 capsule Per NG tube Daily with breakfast    piperacillin-tazobactam  3,375 mg IntraVENous Q12H    chlorhexidine  15 mL Mouth/Throat BID    ketamine  100 mg IntraVENous Once    lidocaine 1 % injection  5 mL IntraDERmal Once    insulin lispro  0-6 Units SubCUTAneous Q6H    allopurinol  100 mg Oral Daily    phosphorus replacement protocol   Other RX Placeholder    potassium bicarb-citric acid  40 mEq Oral Once    tiotropium-olodaterol  2 puff Inhalation Daily    [Held by provider] aspirin  81 mg Oral Daily    pantoprazole  40 mg IntraVENous QAM    sodium hypochlorite   Irrigation Daily    Riociguat  2.5 mg Oral Q8H    sodium chloride flush  5-40 mL IntraVENous 2 times per day    FLUoxetine  40 mg Oral Daily    [Held by provider] metoprolol tartrate  12.5 mg Oral BID     LORazepam, lidocaine PF, ondansetron, albuterol, fentanNYL, glucose, dextrose, glucagon (rDNA), dextrose, polyethylene glycol, senna, acetaminophen, sodium chloride, sodium chloride flush  IV Drips/Infusions   dextrose 100 mL/hr (11/24/21 1834)    sodium chloride Stopped (11/17/21 0709)    [Held by provider] sodium chloride 50 mL/hr at 10/28/21 2314     Diet    Diet NPO  ADULT TUBE FEEDING; Orogastric; Renal Formula; Continuous; 10; Yes; 10; Q 8 hours; 30; 30; Q 4 hours; Protein; 1 ( 2.5 oz) liquid protein daily  Allergies    Patient has no known allergies. Vitals     height is 4' 9\" (1.448 m) and weight is 169 lb 12.1 oz (77 kg). Her oral temperature is 98.3 °F (36.8 °C). Her blood pressure is 121/59 (abnormal) and her pulse is 80. Her respiration is 20 and oxygen saturation is 95%.    Body mass index is 36.73 kg/m². SUPPLEMENTAL O2: O2 Flow Rate (L/min): 16 L/min   Input/Output       Intake/Output Summary (Last 24 hours) at 11/24/2021 1836  Last data filed at 11/24/2021 1523  Gross per 24 hour   Intake 695 ml   Output 795 ml   Net -100 ml     I/O last 3 completed shifts: In: 8793 [I.V.:212.3; NG/GT:337; IV Piggyback:95.7]  Out: 2985 [Urine:85; Stool:700]   Patient Vitals for the past 96 hrs (Last 3 readings):   Weight   11/24/21 0356 169 lb 12.1 oz (77 kg)   11/23/21 1815 165 lb 12.6 oz (75.2 kg)   11/23/21 1456 171 lb 4.8 oz (77.7 kg)     Physical Exam   General: Acute on chronically ill-appearing elderly white female on trach ventilation   HEENT: Temporal wasting appreciated. Normocephalic and atraumatic. No scleral icterus. PERR  Neck: supple. No Thyromegaly. Left subclavian dialysis catheter   Lungs: Mild diffuse rhonchi appreciated bilaterally No retractions  Cardiac: RRR. No JVD. Abdomen: soft. Nontender,nephrostomy tube with bloody drainage  Extremities: +1 pitting edema x4. (interval improvement noted) No clubbing, cyanosis   Vasculature: capillary refill < 3 seconds. Palpable dorsalis pedis pulses. Skin:  warm and dry. Psych: Alert and oriented to person place and time, affect appropriate  Lymph:  No supraclavicular adenopathy. Neurologic:  No focal deficit. No seizures.   Labs   ABG  Lab Results   Component Value Date    PH 7.36 11/13/2021    PO2 59 11/13/2021    PCO2 45 11/13/2021    HCO3 26 11/13/2021    O2SAT 89 11/13/2021     Lab Results   Component Value Date    IFIO2 70 11/13/2021    MODE PC 11/05/2021    SETPEEP 8.0 11/15/2021     CBC  Recent Labs     11/22/21  0340 11/23/21  0400   WBC 5.8 4.8   RBC 2.83* 2.97*   HGB 8.4* 8.7*   HCT 26.8* 27.6*   MCV 94.7 92.9   MCH 29.7 29.3   MCHC 31.3* 31.5*    159   MPV 9.2* 9.8      BMP  Recent Labs     11/22/21  0340 11/23/21  0400 11/24/21  0640   * 124* 133*   K 3.7 4.0 3.4*   CL 93* 88* 96*   CO2 23 21* 23   BUN 39* 54* 34* CREATININE 3.0* 3.5* 2.7*   GLUCOSE 104 166* 82   MG 1.9 1.8  --    PHOS 4.1 4.3  --    CALCIUM 8.3* 8.5 8.5     LFT  Recent Labs     11/22/21  0340 11/23/21  0400   AST 12 11   ALT 13 13   BILITOT 0.4 0.3   ALKPHOS 106 115     TROP  Lab Results   Component Value Date    TROPONINT 0.084 10/28/2021    TROPONINT 0.059 10/27/2021    TROPONINT 0.037 03/06/2020     BNP  No results for input(s): BNP in the last 72 hours. Lactic Acid  No results for input(s): LACTA in the last 72 hours. INR  No results for input(s): INR, PROTIME in the last 72 hours. PTT  No results for input(s): APTT in the last 72 hours. Glucose  Recent Labs     11/24/21  0631 11/24/21  1213 11/24/21  1753   POCGLU 87 86 78     Microbiology     Culture with Smear, Acid Fast Bacillius [[id number]] Collected: 10/27/21 1550   Order Status: Completed Specimen: Body Fluid Updated: 11/23/21 1331    AFB Culture & Smear SEE BELOW   Culture, Anaerobic and Aerobic [[id number]] (Abnormal)  Collected: 11/19/21 1048   Order Status: Completed Specimen: Coccyx Updated: 11/23/21 0601    Aerobic Culture Culture also yielded very light growth of Staphylococcus species (coagulase negative), and additional enteric gram negative bacilli. At least one of drugs in current antibiotic therapy is ineffective in vitro for isolate.  Abnormal     Anaerobic Culture Culture overgrown by swarming Proteus species. No further evaluation of this culture is possible. If a true mixed aerobic and anaerobic infection is suspected, then broad spectrum empiric antibiotic therapy is indicated and should include coverage for anaerobic organisms. Gram Stain Result No segmented neutrophils observed. No epithelial cells observed. No organisms observed.      Organism Proteus mirabilis Abnormal     Aerobic Culture moderate growth     Organism Pseudomonas aeruginosa Abnormal     Aerobic Culture light growth      Procedures   - Hemodialysis on 11/23/21    Problem List      C/Maribel Ashok Banks [id number] Problems    Diagnosis Date Noted    Paroxysmal atrial fibrillation ([address] [I48.0] 11/22/2021    Hemoptysis [R04.2]     Chronic respiratory failure with hypoxia (HCC) [J96.11]     Aspiration pneumonia of left lung ([address] [J69.0]     Pleural effusion [J90]     Shock ([address] [R57.9]     Flash pulmonary edema ([address] [J81.0]     Acute respiratory failure ([address] [J96.00] 10/27/2021    Pressure ulcer of right buttock, stage 3 ([address] [L89.313] 07/29/2021    Pulmonary HTN ([address] [I27.20] 09/03/2019      Assessment & Plan:   1. Subacute combined hypercapnic and hypoxic respiratory failure: D/t to pneumonia with L-sided pleural effusion and repeated mucous plugging. Intubated 10/27/21. Extubated following successful SBT on 11/9/21. Failed HFNC and was reintubated on 11/15/21. Tracheostomy placed 11/17/21.    - Pulmonology following    - Wean as tolerated   - Lung protective strategies    - Aspiration precautions    2. Stage III ALVIN on CKD 3: Suspect post renal etiology 2/2 staghorn calculi. Nephrology following. CRRT 11/5/21 until 11/12/21. Anti-DS-DNA negative, Anti histone negative, Anti Smith,and  Anti-Savana WNL, GBM antibodies negative, C3/C4 levels normal, elevated kappa/lambda free light chains with normal ratio. HD started 11/20/21. Positive for ANCA associated vasculitis. ANCA associated Abs show elevated anti MPO elevated 416 and serine proteinase 3 IgG WNL   - Plans for biopsy per nephrology when stable   - CVVH stopped 11/15/21 significant oliguria since that time   - Hemodialysis 11/23/21, will repeat on 11/26  3. Dysphagia: 2/2 prolonged intubation.     - GI following, PEG tube placed on 11/24/21    - Avoid Lovenox/anticoagulation until 11/26   - Begin protonix daily    - Nutrition consulted for Enteral feeding prescription, may begin using PEG  At 1900 for nutrition   - May begin using PEG for medications and flushing at 1600.   4. Hemoptysis: (resolved): Bronchoscopy on 11/22 remarkable for old blood stained mucus in RLL. No active bleed. - Pulm following, appreciate recs     - Jose Alfredo 300 mg by nebulization twice daily   - SCDs bilaterally, hold AC  5. Acute blood loss anemia with chronic normocytic anemia: 2/2 hematuria s/p perc tube placement w/ Iron studies consistent w/ MERCEDES vs Anemia chronic disease. B12 WNL, Folate low, Abs reticulocyte index 11/18/21 0.49% Soluable transferrin 139. Calculated Iron deficit 827mg. PRBC x1 transfused 10/31/21, 11/4/21, 11/5/21, x2 11/6/21, x3 11/8/21, x3 11/15/21.    - Heparin stopped (dialysis dose still running)   - Continue holding ASA    - Venofer, replace folate once ABx stopped  6. 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 Probes to bone, necrosis noted around wound site. CT abdomen and pelvis showing possible mi involvement with concern for osteomyelitis. Seen by general surgery on 11/17/21. No indications for debridement at the time. Poor candidate for surgery given multiple co-morbidities, malnutrition and poor wound healing. Wound cultures from 11/19 growing proteus mirabilis and pseudomonas aeruginosa. Completed 7-day course of Zosyn on 11/24 for proteus mirabilis & pseudomonas aeruginosa . - Wound care following, continue Dakins w/ dressing changes  7. Hypokalemia: 3.4 on 11/24. Potassium replacement protocol in place. 8. Hyponatremia: 124 on 11/23 in setting of ALVIN stage III on CKD. Will get HD on 11/23/21. Monitor with daily BMP   9. Hematuria: s/p percutaneous tube placement. CBI per above. Stopped heparin, holding ASA. 10. ANCA associated Vasculitis: workup per above. Will need Renal Bx for confirmation once stable. Discussed case w/ Nephrology. Completed pulse dose steroids starting on 11/8/21 w/ 10mg/kg methylprednisolone for 3 days. SSI started for coverage. Not candidate for Rituxin/TPE 2/2 existing infection. 11. Bilateral Pleural Effusion:   Associated volume overload in context of renal failure. Interval enlargement noted on CT 11/1/21. Currently on CVVH for effusions. Plan for chest tube insertion if no improvement  12. Hydronephrosis: S/p nephrostomy tube placement still some bloodstained discharge  13. Suspected COPD: current every day smoker. Obstructive process noted on flow volume loop on ventilator. Started empiric therapy with Stiolto. Recommend formal PFT once improved  14. PAH/chronic RV failure NYHA II: EF 55 to 60% G2 DD TTE 5/4/2021. RHC showed [place] 30 with elevated PCWP. Treated w/ Riociguat. 15. Chronic tobacco use:  cessation  16. Gout: Continue allopurinol  17. BARBER: non compliant w/ CPAP   18. Septic Shock: Resolved. 2/2 severe PNA now concern for necrotizing wound on sacrum. Initial CI 6.3 w/ NE precludes concern for septic shock NE d/c 2/2 worsening afib RVR  19. Thrombocytopenia: Resolved. Suspect consumptive process in context of sepsis. Not on heparin    20. Severe bilateral multiorganism pneumonia: Resolved. PCR positive Staph w/ MECA gene and adenovirus consistent w/ MRSA colonization. Staph completed 14 days Vancomycin (started 10/28/21) and completed 5 days Rocephin. Clindamycin started for coverage of PVL as patient clinically worsening following 14 days Vancomycin for MRSA coverage. Underwent bronchoscopy for left mainstem mucus plug noted CXR on 11/11/21. Switched to Clindamycin for coverage of PVL received 8 days therapy (started 11/11/21). PNA panel from 11/15/21 negative.  Repeat cultures show no growth    PT/OT Eval Status: Daily PT    VTE prophylaxis: [] Lovenox                                 [x] SCDs                                 [] SQ Heparin                                 [] Encourage ambulation           [] Already on Anticoagulation     IV Fluids: None   Diet:  PEG tube   Code Status: Full Code  Disposition: [facility] LTACH once more stable     Tele:   [x] yes [] no    Electronically signed by   Richard Jorgensen DO on 11/24/2021 at 6:36 PM

## 2021-11-24 NOTE — PROGRESS NOTES
[facility]      Patient - Alireza Cueva   MRN -  [id number]   [facility]   - 1950      Date of Admission -  10/27/2021  1:36 PM  Date of evaluation -  2021  Room - 15 [facility] Day -   Consulting - Mari Andersen MD Primary Care Physician - Jeff Portillo MD     Problem List      Active Hospital Problems    Diagnosis Date Noted    Paroxysmal atrial fibrillation ([address] [I48.0] 2021    Hemoptysis [R04.2]     Chronic respiratory failure with hypoxia (HCC) [J96.11]     Aspiration pneumonia of left lung ([address] [J69.0]     Pleural effusion [J90]     Shock ([address] [R57.9]     Flash pulmonary edema ([address] [J81.0]     Acute respiratory failure ([address] [J96.00] 10/27/2021    Pressure ulcer of right buttock, stage 3 ([address] [L89.313] 2021    Pulmonary HTN ([address] [I27.20] 2019     Reason for Consult    Vent management, Hemoptysis  HPI   History Obtained From: Patient's niece at bedside and electronic medical record. Alireza Cueva is a 73 y.o. female never smoker with PMHx chronic diastolic CHF (EF 82-14%, F6XX per TTE 21) / NYHA II / chronic RV failure / severe PAH (PA 85/31 - mean 52 / PVR 10), mild AS (valve area 1.7 sq cm), morbid obesity, HTN and depression -- presents to [facility] with a chief complaint of shortness of breath. History obtained from the EMR (patient intubated / sedated on the ventilator).     Patient presented to [facility] ED after her sister noticed the patient was experiencing worsening shortness of breath throughout the day / recorded SpO2 51% on home pulse oximeter prior to arrival. The patient has a known history of CHF and PAH, for which she follows with Dr. Amber Sanders (cardiology) and TYL[facility] Gross [address]4 / currently on adempas. Per report, she was recently discontinued from oral bumex, which she stopped taking just this morning.  She endorsed mild headache on arrival (poorly characterized), but no associated fevers/chills, chest pain, palpitations, cough, n/v/d, abdominal pain, urinary symptoms, weakness or syncope were reported. Limited additional history available. Per patient's niece the patient was sedentary at home before admission to the hospital.     Patient was intubated on 10/27/21. Extubated following successful SBT on 11/9/21. Status post bronchoscopy with mucous plug removal from the left mainstem on 11/13. Failed HFNC and was reintubated on 11/15/21. Tracheostomy tube was placed on 11/17/21. She is having shortness of breath: No  Functional status prior to beginning of symptoms: 0 block/s on level ground. Current functional capacity on level ground: 0 block/s on level ground. She can climb steps: No  Flights of steps she can climb: 0    She is having cough: Yes  Duration of cough: for 30+ days.    Her cough is associated with sputum production: Yes   The sputum color: white  Hemoptysis:Yes    She is having chest pain:No      Past 24 hrs   -Noted tan secretions today  -On vent via trach non-verbal  -Dneies increased SOB is having thin secretions   All other systems reviewed    PMHx   Past Medical History      Diagnosis Date    CHF (congestive heart failure) ([facility])     Dr. Rupert Vasquez    Depression     Gout attack     Hypertension     Osteoarthritis     Pulmonary artery hypertension ([facility] [address]     Rocio Frazier-- Dr. Zac Hutton-- Dr. Allison De Souza Renal calculi     Dr. Clive Stout [facility])       Past Surgical History        Procedure Laterality Date    APPENDECTOMY  1960    BRONCHOSCOPY N/A 11/22/2021    BRONCHOSCOPY performed by Mali Yarbrough MD at [facility]   [address] [place] [address]    IR NEPHROSTOMY PERCUTANEOUS LEFT  11/1/2021    IR NEPHROSTOMY PERCUTANEOUS LEFT 11/1/2021 Jennie Ocampo MD [facility] SPECIAL PROCEDURES    PRESSURE ULCER DEBRIDEMENT Right 8/6/2021    EXCISION RIGHT BUTTOCK WOUND AND CLOSURE performed by Claudetta Ra, MD at [facility]    alteplase  2 mg IntraCATHeter Once    alteplase  2 mg IntraCATHeter Once    sterile water  4.4 mL Injection Once in dialysis    metoclopramide  5 mg IntraVENous Q8H    sodium chloride (Inhalant)  4 mL Nebulization Q6H    And    albuterol  2.5 mg Nebulization Q6H    lactobacillus  1 capsule Per NG tube Daily with breakfast    piperacillin-tazobactam  3,375 mg IntraVENous Q12H    chlorhexidine  15 mL Mouth/Throat BID    ketamine  100 mg IntraVENous Once    lidocaine 1 % injection  5 mL IntraDERmal Once    insulin lispro  0-6 Units SubCUTAneous Q6H    allopurinol  100 mg Oral Daily    phosphorus replacement protocol   Other RX Placeholder    potassium bicarb-citric acid  40 mEq Oral Once    tiotropium-olodaterol  2 puff Inhalation Daily    [Held by provider] aspirin  81 mg Oral Daily    pantoprazole  40 mg IntraVENous QAM    sodium hypochlorite   Irrigation Daily    Riociguat  2.5 mg Oral Q8H    sodium chloride flush  5-40 mL IntraVENous 2 times per day    FLUoxetine  40 mg Oral Daily    [Held by provider] metoprolol tartrate  12.5 mg Oral BID     LORazepam, lidocaine PF, ondansetron, albuterol, fentanNYL, glucose, dextrose, glucagon (rDNA), dextrose, polyethylene glycol, senna, acetaminophen, sodium chloride, sodium chloride flush  IV Drips/Infusions   dextrose      sodium chloride Stopped (11/17/21 0709)    [Held by provider] sodium chloride 50 mL/hr at 10/28/21 2314     Home Medications  Medications Prior to Admission: metoprolol tartrate (LOPRESSOR) 25 MG tablet, Take 0.5 tablets by mouth 2 times daily  sodium hypochlorite (DAKINS) 0.125 % SOLN external solution, Apply Dakin's moistened gauze dressings to wound twice daily and as needed.   sodium chloride 1 g tablet, Take 1 tablet by mouth 2 times daily  allopurinol (ZYLOPRIM) 300 MG tablet, Take 1 tablet by mouth daily  FLUoxetine (PROZAC) 40 MG capsule, Take 1 capsule by mouth daily  potassium chloride (KLOR-CON M) 10 MEQ extended release tablet, Take 1 tablet by mouth every other day  bumetanide (BUMEX) 1 MG tablet, Take 1 tablet by mouth daily  Multiple Vitamins-Minerals (MULTIVITAMIN WOMEN PO), Take 1 tablet by mouth daily  acetaminophen (TYLENOL) 650 MG extended release tablet, Take 650 mg by mouth every 8 hours as needed for Pain  Riociguat (ADEMPAS) 2.5 MG TABS, Take 2.5 mg by mouth 3 times daily  docusate sodium (COLACE) 100 MG capsule, Take 100 mg by mouth as needed   aspirin 81 MG tablet, Take 81 mg by mouth daily   Diet    Diet NPO  ADULT TUBE FEEDING; Orogastric; Renal Formula; Continuous; 10; Yes; 10; Q 8 hours; 30; 30; Q 4 hours; Protein; 1 ( 2.5 oz) liquid protein daily  Allergies    Patient has no known allergies. Social History     Social History     Socioeconomic History    Marital status: Single     Spouse name: Not on file    Number of children: 0    Years of education: Not on file    Highest education level: Not on file   Occupational History    Not on file   Tobacco Use    Smoking status: Never Smoker    Smokeless tobacco: Never Used   Vaping Use    Vaping Use: Never used   Substance and Sexual Activity    Alcohol use: No    Drug use: No    Sexual activity: Not Currently   Other Topics Concern    Not on file   Social History Narrative    Not on file     Social Determinants of Health     Financial Resource Strain: Low Risk     Difficulty of Paying Living Expenses: Not very hard   Food Insecurity: No Food Insecurity    Worried About Running Out of Food in the Last Year: Never true    Traci of Food in the Last Year: Never true   Transportation Needs:     Lack of Transportation (Medical): Not on file    Lack of Transportation (Non-Medical):  Not on file   Physical Activity:     Days of Exercise per Week: Not on file    Minutes of Exercise per Session: Not on file   Stress:     Feeling of Stress : Not on file   Social Connections:     Frequency of Communication with Friends and Family: Not on file    Frequency of Social Gatherings with Friends and Family: Not on file    Attends Confucianist Services: Not on file    Active Member of Clubs or Organizations: Not on file    Attends Club or Organization Meetings: Not on file    Marital Status: Not on file   Intimate Partner Violence:     Fear of Current or Ex-Partner: Not on file    Emotionally Abused: Not on file    Physically Abused: Not on file    Sexually Abused: Not on file   Housing Stability:     Unable to Pay for Housing in the Last Year: Not on file    Number of Places Lived in the Last Year: Not on file    Unstable Housing in the Last Year: Not on file     Family History          Problem Relation Age of Onset    Diabetes Father     Arthritis Mother     COPD Mother     Diabetes Sister     Heart Disease Maternal Uncle     Breast Cancer Niece 36    Sleep Apnea Brother     Asthma Neg Hx     Birth Defects Neg Hx     Cancer Neg Hx     Depression Neg Hx     Early Death Neg Hx     Hearing Loss Neg Hx     High Blood Pressure Neg Hx     High Cholesterol Neg Hx     Kidney Disease Neg Hx     Learning Disabilities Neg Hx     Mental Illness Neg Hx     Mental Retardation Neg Hx     Miscarriages / Stillbirths Neg Hx     Stroke Neg Hx     Substance Abuse Neg Hx     Vision Loss Neg Hx     Other Neg Hx      Sleep History    Never diagnosed with sleep apnea in the past.  Occupational history   Occupation:  She is current working: No  Type of profession: unemployed, disabled.                      History of tobacco smoking:No     History of recreational or IV drug use in the past:NO     History of exposure to coal mines/coal dust: NO  History of exposure to foundry dust/welding: NO  History of exposure to quarry/silica/sandblasting: NO  History of exposure to asbestos/working with breaks/ships: NO  History of exposure to farm dust: NO  History of recent travel to long distances: NO  History of exposure to birds, pigeons, or chickens in the past:NO    History of pulmonary embolism in the past: No            History of DVT in the past:No              Vitals     height is 4' 9\" (1.448 m) and weight is 169 lb 12.1 oz (77 kg). Her oral temperature is 98.6 °F (37 °C). Her blood pressure is 133/62 and her pulse is 93. Her respiration is 16 and oxygen saturation is 98%. Body mass index is 36.73 kg/m². SUPPLEMENTAL O2: O2 Flow Rate (L/min): 16 L/min     I/O        Intake/Output Summary (Last 24 hours) at 11/24/2021 1508  Last data filed at 11/24/2021 0854  Gross per 24 hour   Intake 1145 ml   Output 2985 ml   Net -1840 ml     I/O last 3 completed shifts: In: 3711 [I.V.:212.3; NG/GT:337; IV Piggyback:95.7]  Out: 2985 [Urine:85; Stool:700]   Patient Vitals for the past 96 hrs (Last 3 readings):   Weight   11/24/21 0356 169 lb 12.1 oz (77 kg)   11/23/21 1815 165 lb 12.6 oz (75.2 kg)   11/23/21 1456 171 lb 4.8 oz (77.7 kg)       Exam   Physical Exam   Constitutional: No distress on vent via trach. Patient appears moderately built. Head: Normocephalic and atraumatic. Mouth/Throat: Oropharynx is clear and moist.  No oral thrush. Eyes: Conjunctivae are normal. Pupils are equal, round. No scleral icterus. Neck: Neck supple. 7.5 mm Bivona portex in place  Cardiovascular: S1 and S2 with no murmur. No peripheral edema  Pulmonary/Chest: Normal effort with bilateral air entry, clear breath sounds. No stridor. No respiratory distress. Patient exhibits no tenderness. Abdominal: Soft. Bowel sounds audible. No distension or tenderness to palp.    Musculoskeletal: Moves all extremities  Neurological: Patient is alert and follows simple commands      Labs  - Old records and notes have been reviewed in [facility]   ABG  Lab Results   Component Value Date    PH 7.36 11/13/2021    PO2 59 11/13/2021    PCO2 45 11/13/2021    HCO3 26 11/13/2021    O2SAT 89 11/13/2021     Lab Results   Component Value Date    IFIO2 70 11/13/2021    MODE PC 11/05/2021    SETPEEP 8.0 11/15/2021     CBC  Recent Labs     11/22/21  0340 11/23/21  0400   WBC 5.8 4.8   RBC 2.83* 2.97*   HGB 8.4* 8.7*   HCT 26.8* 27.6*   MCV 94.7 92.9   MCH 29.7 29.3   MCHC 31.3* 31.5*    159   MPV 9.2* 9.8      BMP  Recent Labs     11/22/21  0340 11/23/21  0400 11/24/21  0640   * 124* 133*   K 3.7 4.0 3.4*   CL 93* 88* 96*   CO2 23 21* 23   BUN 39* 54* 34*   CREATININE 3.0* 3.5* 2.7*   GLUCOSE 104 166* 82   MG 1.9 1.8  --    PHOS 4.1 4.3  --    CALCIUM 8.3* 8.5 8.5     LFT  Recent Labs     11/22/21  0340 11/23/21  0400   AST 12 11   ALT 13 13   BILITOT 0.4 0.3   ALKPHOS 106 115     TROP  Lab Results   Component Value Date    TROPONINT 0.084 10/28/2021    TROPONINT 0.059 10/27/2021    TROPONINT 0.037 03/06/2020     BNP  No results for input(s): BNP in the last 72 hours. Lactic Acid  No results for input(s): LACTA in the last 72 hours. INR  No results for input(s): INR, PROTIME in the last 72 hours. PTT  No results for input(s): APTT in the last 72 hours. Glucose  Recent Labs     11/24/21  0019 11/24/21  0631 11/24/21  1213   POCGLU 105 87 86     UA No results for input(s): SPECGRAV, PHUR, COLORU, CLARITYU, MUCUS, PROTEINU, BLOODU, RBCUA, WBCUA, BACTERIA, NITRU, GLUCOSEU, BILIRUBINUR, UROBILINOGEN, KETUA, LABCAST, LABCASTTY, AMORPHOS in the last 72 hours. Invalid input(s): CRYSTALS. PFTs           Sleep studies   Study Results  Initial Study Date -  11/7/19  AHI -  5.8   TotalEvents - 32  (Apneas  19  Hypopneas 13  Central  0)  LM w/Arousals - 0  Sleep Efficiency - 70.8 % (Total Sleep Time - 330 min)  Time with Sats below 88% - 0 min    Cultures    -Anaerobic and aerobic culture: (From Coccyx) Positive for Proteus mirabilis and Pseudomonas aeruginosa, culture also yielded very light growth of Staphylococcus species (coagulase negative), and additional enteric gram negative bacilli  -Respiratory culture on 11/11/21 of bronchial washing:No bacteria seen.    -Molecular pneumonia panel of sputum on 11/15/2021: Negative  -AFB culture with smear: negative  -pneumonia panel of bronchial washings 11/13/2021: Positive for staph aureus and resistant gene meca/c & mrej  -Body fluid culture on 11/5/2021: positive for Staph epidermidis, very light growth Isolates of Methicillin Resistant Staphylococcus coagulase negative (MRSE)  -Respiratory culture on 11/5/2021: Positive for staphylococcal aureus,  EKG     Echocardiogram   Complete 2D ECHO with doppler with color 10/27/21:  Mitral Valve   The mitral valve structure was normal with normal leaflet separation. DOPPLER: The transmitral velocity was within the normal range with no   evidence for mitral stenosis. There was no evidence of mitral   regurgitation. Aortic Valve   The aortic valve was trileaflet with normal thickness and cuspal   separation. DOPPLER: Transaortic velocity was within the normal range with   no evidence of aortic stenosis. There was no evidence of aortic   regurgitation. Tricuspid Valve   The tricuspid valve structure was normal with normal leaflet separation. DOPPLER: There was no evidence of tricuspid stenosis. There was trace   tricuspid regurgitation. Pulmonic Valve   The pulmonic valve leaflets exhibited normal thickness, no calcification,   and normal cuspal separation. DOPPLER: The transpulmonic velocity was   within the normal range with no evidence for regurgitation. Left Atrium   Left atrial size was normal.      Left Ventricle   Normal left ventricular size and systolic function. There were no regional wall motion abnormalities. Wall thickness was within normal limits. Ejection fraction was estimated at 60-65%. Right Atrium   Right atrial size was normal.      Right Ventricle   The right ventricular size was normal with normal systolic function and   wall thickness.       Pericardial Effusion   The pericardium was normal in appearance with a small, non-hemodynamically   significant pericardial effusion. Prominent pericardial fat pad. Pleural Effusion   No evidence of pleural effusion. Aorta / Great Vessels   IVC size is dilated with reduced respiratory phasic changes (CVP~10-15   mmHg). Radiology    CXR  PORT CXR 11/19/21:  There is a Dobbhoff tube which projects over the stomach. There is a left-sided percutaneous nephrostomy tube, stable compared to prior CT. 1. Mild hepatomegaly. Left subclavian dialysis catheter with tip at cavoatrial junction. NG tube passes into stomach. Tracheostomy tube in good position. Right jugular line with catheter tip in right atrium. 2. Tiny bilateral pleural effusions. Moderate pneumonia/pulmonary edema scattered relatively diffusely in both lungs. 3. Overall appearance of chest has worsened somewhat since prior.       CT Scans  (See actual reports for details)    CT chest without contrast on 11/1/21: There are moderate bilateral pleural effusions which have mildly increased in size in the interval. There is adjacent atelectasis. Pulmonary vessels are prominent, similar to prior exam. There are groundglass opacities adjacent to the pulmonary vessels. The heart is enlarged, similar to prior exam. There is a small pericardial effusion, decreased compared to prior exam. Redemonstration of percutaneous cardiac pacer leads and enteric tube. The endotracheal tube is stable with tip in the distal trachea. There is marked thyromegaly which is nodular in appearance, stable compared to prior exam. Visualized upper abdominal solid organs are grossly unremarkable. There are stable degenerative changes of the thoracic spine with exaggerated thoracic kyphosis.         Assessment   -Hemoptysis in the setting of tracheostomy tube  -Acute hypoxic respiratory failure secondary to severe multi-organism pneumonia with bilateral pleural effusion and repeated mucous plugging   Intubated 10/27/21, extubated 11/9/21, reintubated 11/15/21, tracheostomy tube placed 11/17/21  -S/p bronchoscopy with mucous plug removal from the left mainstem on 11/13/21 by Dr. Adela Goyal in ICU  -Bilateral pleural effusions   -Severe bilateral multiorganism pneumonia: received 14 days of vancomycin, 5 days of rocephin, clinidamycin for 8 days   ANCA associated vasculitis: elevated anti MPO elevated 416 and serine proteinase 3 IgG WNL  -Stage II ALVIN on CKD 3 on HD started 11/20/21  -Hydronephrosis 2/2 Left-sided staghorn calculi  -Acute blood loss anemia: PRBC x1 transfused 10/31/21, 11/4/21, 11/5/21, x2 11/6/21, x3 11/8/21, x3 11/15/21. Heparin stopped (dialysis dose last ran on 11/11/21). -Thrombocytopenia   -Hematuria   -BARBER noncompliant with CPAP     Plan   -Follow pending Bronchoscopy cultures, no endobronchial lesion or active bleeding noted on Bronch 11/22/2021  -Aspiration precautions   -Cautious use of tracheal suction to reduce risk of suction trauma   -Wean FiO2 as tolerated to maintain saturation above 90%, SBT tomorrow will hold on trial today as she is having PEG tube placement  -Nursing communication order to remove panic sutures after PEG tube placement  -Antibiotics per primary   -GI following planning PEG tube placement 11/24/2021    Questions and concerns addressed. Electronically signed by   LANE Roberson - CNP on 11/24/2021 at 3:08 PM     Addendum by Dr. Leopold Som, MD:  I have seen and examined the patient independently. Face to face evaluation and examination was performed. The above evaluation and note has been reviewed. Labs and radiographs were reviewed. I Have discussed with Mr. Haresh Stevens CNP about this patient in detail. The above assessment and plan has been reviewed. Please see my modifications mentioned below.      My modifications:  She is tolerating BiPAP with 12 x 6 and FiO2 30% well  Wean patient as tolerated after PEG placement    Noam Nails MD 11/24/2021 4:49 PM

## 2021-11-24 NOTE — PROGRESS NOTES
EGD complete, photos taken, no  specimens taken , pt tolerated procedure well    Scope Number [id number]  used. Peg tube placed at 3 cm skin level atb ointment applied and dressing.     patinet taken to pacu   and  report given to pacu rn

## 2021-11-24 NOTE — PROGRESS NOTES
[facility]         Renal Inpatient Follow-Up note         11/24/2021 8:50 AM    Pt Name:   Sandra Wilson  YOB: 1950  Attending:   Leno Strong MD    Chief Complaint : Sandra Wilson is a 73 y.o. female being followed by nephrology for ALVIN/CKD    Interval History :   Patient seen and examined by me. No distress  Patient has a trach now not much communicative cannot assess history or review of systems  Mild Urine output. Currently on a vent  For a PEG placement today .       Scheduled Medications :    metoclopramide  5 mg IntraVENous Q8H    sodium chloride (Inhalant)  4 mL Nebulization Q6H    And    albuterol  2.5 mg Nebulization Q6H    lactobacillus  1 capsule Per NG tube Daily with breakfast    piperacillin-tazobactam  3,375 mg IntraVENous Q12H    chlorhexidine  15 mL Mouth/Throat BID    ketamine  100 mg IntraVENous Once    lidocaine 1 % injection  5 mL IntraDERmal Once    insulin lispro  0-6 Units SubCUTAneous Q6H    allopurinol  100 mg Oral Daily    phosphorus replacement protocol   Other RX Placeholder    potassium bicarb-citric acid  40 mEq Oral Once    tiotropium-olodaterol  2 puff Inhalation Daily    [Held by provider] aspirin  81 mg Oral Daily    pantoprazole  40 mg IntraVENous QAM    sodium hypochlorite   Irrigation Daily    Riociguat  2.5 mg Oral Q8H    sodium chloride flush  5-40 mL IntraVENous 2 times per day    FLUoxetine  40 mg Oral Daily    [Held by provider] metoprolol tartrate  12.5 mg Oral BID      dextrose      sodium chloride Stopped (11/17/21 0709)    [Held by provider] sodium chloride 50 mL/hr at 10/28/21 2314       Vitals :  /60   Pulse 94   Temp 98.5 °F (36.9 °C) (Oral)   Resp 19   Ht 4' 9\" (1.448 m)   Wt 169 lb 12.1 oz (77 kg)   SpO2 95%   BMI 36.73 kg/m²     24HR INTAKE/OUTPUT:      Intake/Output Summary (Last 24 hours) at 11/24/2021 0850  Last data filed at 11/24/2021 0806  Gross per 24 hour   Intake 1509 ml Output 3025 ml   Net -1516 ml     Last 3 weights  Wt Readings from Last 3 Encounters:   11/24/21 169 lb 12.1 oz (77 kg)   10/25/21 164 lb (74.4 kg)   09/29/21 160 lb (72.6 kg)           Physical Exam :  General Appearance:  Well developed. No distress  Mouth/Throat:  Oral mucosa moist.  Trach in place  Neck:  Supple, no JVD  Lungs:  Breath sounds: clear, diminished  Heart[de-identified]  S1,S2 heard  Abdomen:  Soft, non - tender  Musculoskeletal:  Edema - noted          Last 3 CBC   Recent Labs     11/22/21  0340 11/23/21  0400   WBC 5.8 4.8   RBC 2.83* 2.97*   HGB 8.4* 8.7*   HCT 26.8* 27.6*    159     Last 3 CMP  Recent Labs     11/22/21  0340 11/23/21  0400 11/24/21  0640   * 124* 133*   K 3.7 4.0 3.4*   CL 93* 88* 96*   CO2 23 21* 23   BUN 39* 54* 34*   CREATININE 3.0* 3.5* 2.7*   CALCIUM 8.3* 8.5 8.5   LABALBU 2.4* 2.7*  --    BILITOT 0.4 0.3  --              ASSESSMENT / Plan   1. Renal -acute kidney injury, multifactorial etiology which includes hypotension from sepsis, IV contrast exposure on 10/29 and has some hydronephrosis as well . ? Patient's creatinine continued to get worse, but primarily fluid status worsened, she was started on CVVH. ? Intradialytic creatinine is rising. Status post IHD 11/20/2021 and 11/22/21 (short treatment due to cath not functioning well.  ? Hd on friday  ? Have to consider switching to tunneled dialysis catheter, however the cultures are positive on 11/19/2021. Send cultures today, if negative tunneled cath placemnt      2. Electrolytes - mild hyponatremia adjust with dialysis,   3. Mild acidosis corrected with dialysis  4. Acute hypercapnic respiratory failure currently ventilator dependent now with a trach , Pulm on board for hemoptysis  5. Hypotension improved currently off pressors. Continue midodrine  6.  Hydronephrosis status post nephrostomy tube placement still some bloodstained discharge  7. + ANCA, patient has been having some proteinuria as an outpatient and during my last visit have ordered all the serologic work-up , which the patient has done prior to getting admitted to the hospital however the ANCA was not done at that time. She may be having some renal pathology going on , but I doubt it has anything to do with acute renal dysfunction during this admission. However she has received steroids. Once clinically stable might consider a renal biopsy. 8. Meds reviewed. Discussed with the patient     Dr. Rossana Jerry MD, M,D.  [facility].

## 2021-11-24 NOTE — ANESTHESIA PRE PROCEDURE
Department of Anesthesiology  Preprocedure Note       Name:  Stefany Garay   Age:  73 y.o.  :  1950                                          MRN:  [id number]         Date:  2021      Surgeon: Nicholas Mauricio):  Aziza Alfredo MD    Procedure: Procedure(s):  EGD PEG TUBE PLACEMENT    Medications prior to admission:   Prior to Admission medications    Medication Sig Start Date End Date Taking? Authorizing Provider   metoprolol tartrate (LOPRESSOR) 25 MG tablet Take 0.5 tablets by mouth 2 times daily 10/25/21  Yes LANE Read CNP   sodium hypochlorite (DAKINS) 0.125 % SOLN external solution Apply Dakin's moistened gauze dressings to wound twice daily and as needed.  21  Yes LANE Landaverde CNP   sodium chloride 1 g tablet Take 1 tablet by mouth 2 times daily 8/3/21  Yes Tonya Jackson MD   allopurinol (ZYLOPRIM) 300 MG tablet Take 1 tablet by mouth daily 21  Yes Hossein Mcintosh MD   FLUoxetine (PROZAC) 40 MG capsule Take 1 capsule by mouth daily 21  Yes Hossein Mcintosh MD   potassium chloride (KLOR-CON M) 10 MEQ extended release tablet Take 1 tablet by mouth every other day 21  Yes LANE Alarcon CNP   bumetanide (BUMEX) 1 MG tablet Take 1 tablet by mouth daily 21  Yes LANE Alarcon CNP   Multiple Vitamins-Minerals (MULTIVITAMIN WOMEN PO) Take 1 tablet by mouth daily   Yes Historical Provider, MD   acetaminophen (TYLENOL) 650 MG extended release tablet Take 650 mg by mouth every 8 hours as needed for Pain   Yes Historical Provider, MD   Riociguat (ADEMPAS) 2.5 MG TABS Take 2.5 mg by mouth 3 times daily   Yes Historical Provider, MD   docusate sodium (COLACE) 100 MG capsule Take 100 mg by mouth as needed    Yes Historical Provider, MD   aspirin 81 MG tablet Take 81 mg by mouth daily    Yes Historical Provider, MD       Current medications:    Current Facility-Administered Medications   Medication Dose Route Frequency Provider Last Rate Oral Daily Luis Enrique Ewa Hemmelgarn, DO   100 mg at 11/24/21 3131    phosphorus replacement protocol   Other RX Placeholder Home Anand DO        fentaNYL (SUBLIMAZE) injection 25 mcg  25 mcg IntraVENous Q1H PRN Debbie Volodymyr, DO   25 mcg at 11/21/21 0525    potassium bicarb-citric acid (EFFER-K) effervescent tablet 40 mEq  40 mEq Oral Once Debbie Chokarley, DO        glucose (GLUTOSE) 40 % oral gel 15 g  15 g Oral PRN Rodney Anand, DO        dextrose 50 % IV solution  12.5 g IntraVENous PRN Debbie Volodymyr, DO        glucagon (rDNA) injection 1 mg  1 mg IntraMUSCular PRN Rodney Anand, DO        dextrose 5 % solution  100 mL/hr IntraVENous PRN Debbie Helm, DO        tiotropium-olodaterol (STIOLTO) 2.5-2.5 MCG/ACT inhaler 2 puff  2 puff Inhalation Daily Rad Anand, DO   2 puff at 11/24/21 0648    polyethylene glycol (GLYCOLAX) packet 17 g  17 g Oral Daily PRN Rodney Anand, DO   17 g at 11/16/21 1704    [Held by provider] aspirin chewable tablet 81 mg  81 mg Oral Daily Cindy Trejo MD   81 mg at 11/07/21 0824    senna (SENOKOT) tablet 8.6 mg  1 tablet Oral BID PRN Rodney Anand, DO   8.6 mg at 11/09/21 0823    pantoprazole (PROTONIX) injection 40 mg  40 mg IntraVENous QAM LANE Srivastava - CNP   40 mg at 11/24/21 0948    acetaminophen (TYLENOL) tablet 650 mg  650 mg Oral Q4H PRN Mario Bright MD   650 mg at 11/14/21 1322    sodium hypochlorite (DAKINS) 0.125 % external solution   Irrigation Daily Paloma Pavan V, DO   Given at 11/24/21 0955    0.9 % sodium chloride infusion  25 mL IntraVENous PRN Edcouch Cutting V, DO   Stopped at 11/17/21 0709    Riociguat TABS 2.5 mg  2.5 mg Oral Q8H Paloma Pavan V, DO   2.5 mg at 11/24/21 6871    sodium chloride flush 0.9 % injection 5-40 mL  5-40 mL IntraVENous 2 times per day Moriah Blankenship DO   10 mL at 11/24/21 0948    sodium chloride flush 0.9 % injection 5-40 mL  5-40 mL IntraVENous PRN Moriah Blankenship DO       [facility] FLUoxetine (PROZAC) capsule 40 mg  40 mg Oral Daily Cindy Trejo MD   40 mg at 11/24/21 0948    [Held by provider] metoprolol tartrate (LOPRESSOR) tablet 12.5 mg  12.5 mg Oral BID Vika Cuba MD        [Held by provider] 0.9 % sodium chloride infusion   IntraVENous Continuous Cindy Trejo MD 50 mL/hr at 10/28/21 2314 New Bag at 10/28/21 2314       Allergies:  No Known Allergies    Problem List:    Patient Active Problem List   Diagnosis Code    HTN (hypertension) I10    Chronic depression F32. A    CHF (congestive heart failure) ([facility]) I50.9    Thrombocytopenia (HCC) D69.6    Moderate episode of recurrent major depressive disorder ([facility]) F33.1    Renal failure syndrome N19    Hyperkalemia E87.5    Isolated non-nephrotic proteinuria R80.0    ALVIN (acute kidney injury) ([facility]) N17.9    Chronic right-sided heart failure (HCC) I50.812    Pulmonary HTN ([facility]) I27.20    Hypotension due to hypovolemia I95.89, E86.1    Acute renal failure superimposed on stage 4 chronic kidney disease (HCC) N17.9, N18.4    Pressure ulcer of right buttock, stage 3 (HCC) L89.313    Acute respiratory failure ([facility]) J96.00    Flash pulmonary edema (HCC) J81.0    Shock (HCC) R57.9    Aspiration pneumonia of left lung (HCC) J69.0    Pleural effusion J90    Paroxysmal atrial fibrillation (HCC) I48.0    Hemoptysis R04.2    Chronic respiratory failure with hypoxia ([facility]) J96.11       Past Medical History:        Diagnosis Date    CHF (congestive heart failure) ([facility])     Dr. Earline Arango    Depression     Gout attack     Hypertension     Osteoarthritis     Pulmonary artery hypertension ([facility] [address]     [facility]-- Dr. Michael Oneill-- Dr. Fe Lovell Renal calculi     Dr. Kevin Monaco Thrombocytopenia [facility])        Past Surgical History:        Procedure Laterality Date    APPENDECTOMY  1960    BRONCHOSCOPY N/A 11/22/2021    BRONCHOSCOPY performed by Chapin Calhoun MD at [address] [facility]    IR NEPHROSTOMY PERCUTANEOUS LEFT  11/1/2021    IR NEPHROSTOMY PERCUTANEOUS LEFT 11/1/2021 Pop Joshua MD [facility] PROCED[facility]    PRESSURE ULCER DEBRIDEMENT Right 8/6/2021    EXCISION RIGHT BUTTOCK WOUND AND CLOSURE performed by Lucy Vaughn MD at [address] [place] [address] History:    Social History     Tobacco Use    Smoking status: Never Smoker    Smokeless tobacco: Never Used   Substance Use Topics    Alcohol use: No                                Counseling given: Not Answered      Vital Signs (Current):   Vitals:    11/24/21 0929 11/24/21 1146 11/24/21 1256 11/24/21 1409   BP:  (!) 114/53  133/62   Pulse: 95 88 88 93   Resp: 16 20 12 16   Temp:  98.6 °F (37 °C)     TempSrc:  Oral     SpO2: 95% 96% 96% 98%   Weight:       Height:                                                  BP Readings from Last 3 Encounters:   11/24/21 133/62   11/22/21 (!) 89/52   10/25/21 108/64       NPO Status: Time of last liquid consumption: 1130                        Time of last solid consumption: 1200                        Date of last liquid consumption: 11/22/21                        Date of last solid food consumption: 10/20/21    BMI:   Wt Readings from Last 3 Encounters:   11/24/21 169 lb 12.1 oz (77 kg)   10/25/21 164 lb (74.4 kg)   09/29/21 160 lb (72.6 kg)     Body mass index is 36.73 kg/m².     CBC:   Lab Results   Component Value Date    WBC 4.8 11/23/2021    RBC 2.97 11/23/2021    RBC 4.15 01/09/2018    HGB 8.7 11/23/2021    HCT 27.6 11/23/2021    MCV 92.9 11/23/2021    RDW 12.9 01/09/2018     11/23/2021       CMP:   Lab Results   Component Value Date     11/24/2021    K 3.4 11/24/2021    K 4.8 11/17/2021    CL 96 11/24/2021    CO2 23 11/24/2021    BUN 34 11/24/2021    CREATININE 2.7 11/24/2021    LABGLOM 17 11/24/2021    GLUCOSE 82 11/24/2021    GLUCOSE 103 01/09/2018    PROT 5.2 11/23/2021    CALCIUM 8.5 11/24/2021    BILITOT 0.3 11/23/2021    ALKPHOS 115 11/23/2021    AST 11 11/23/2021    ALT 13 11/23/2021       POC Tests:   Recent Labs     11/24/21  1213   POCGLU 86       Coags:   Lab Results   Component Value Date    INR 1.02 11/18/2021    APTT 88.9 11/04/2021       HCG (If Applicable): No results found for: PREGTESTUR, PREGSERUM, HCG, HCGQUANT     ABGs: No results found for: PHART, PO2ART, DSY6XZY, FZK8UWQ, BEART, G6VSRIUM     Type & Screen (If Applicable):  Lab Results   Component Value Date    LABRH NEG 11/19/2021       Drug/Infectious Status (If Applicable):  Lab Results   Component Value Date    HEPCAB Negative 10/25/2021       COVID-19 Screening (If Applicable):   Lab Results   Component Value Date    COVID19 NOT  DETECTED 10/27/2021           Anesthesia Evaluation    Airway: Mallampati: Unable to assess / NA        Dental:          Pulmonary:                             ROS comment: tracheostomy in place and on vent. Cardiovascular:    (+) hypertension:, CHF:,       ECG reviewed      Echocardiogram reviewed                  Neuro/Psych:   (+) psychiatric history:            GI/Hepatic/Renal:   (+) renal disease:,           Endo/Other:                     Abdominal:             Vascular: Other Findings:             Anesthesia Plan      general     ASA 4       Induction: intravenous. Anesthetic plan and risks discussed with healthcare power of . Plan discussed with CRNA.                   Andrew Lanier MD   11/24/2021

## 2021-11-24 NOTE — H&P
[address] [address]  Sedation/Analgesia History & Physical    Patient: Tee Coyne: 1950  Med Rec#: [id number] Acc#: [id number]   Provider Performing Procedure: Abigail Pimentel MD  Primary Care Physician: Sherrel Frankel, MD    PRE-PROCEDURE   Brief History/Pre-Procedure Diagnosis:The patient is a 73 y.o.,  female with significant past medical history of acute respiratory failure s/p tracheostomy placement 11/17/21 and has nausea with vomiting and malnutrition. MEDICAL HISTORY  []CAD/Valve  []Liver Disease  []Lung Disease []Diabetes  []Hypertension []Renal Disease  [x]Additional information:       has a past medical history of CHF (congestive heart failure) ([facility] [address] Depression, Gout attack, Hypertension, Osteoarthritis, Pulmonary artery hypertension ([facility] [address] Renal calculi, and Thrombocytopenia ([facility] [address] SURGICAL HISTORY   has a past surgical history that includes Appendectomy (1960); Facial nerve surgery; Pressure ulcer debridement (Right, 8/6/2021); IR GUIDED NEPHROSTOMY CATH PLACEMENT LEFT (11/1/2021); and bronchoscopy (N/A, 11/22/2021).   Additional information:       ALLERGIES   Allergies as of 10/27/2021    (No Known Allergies)     Additional information:       MEDICATIONS       Current Facility-Administered Medications:     LORazepam (ATIVAN) injection 1 mg, 1 mg, IntraVENous, Q6H PRN, Cindy Trejo MD, 1 mg at 11/21/21 2336    lidocaine PF 4 % injection 4 mL, 4 mL, Inhalation, 4x Daily PRN, LANE Ritchie - CNP, 4 mL at 11/21/21 0928    metoclopramide (REGLAN) injection 5 mg, 5 mg, IntraVENous, Q8H, Velvet Rand, [facility], 5 mg at 11/23/21 1831    sodium chloride (Inhalant) 3 % nebulizer solution 4 mL, 4 mL, Nebulization, Q6H, 4 mL at 11/23/21 1801 **AND** albuterol (PROVENTIL) nebulizer solution 2.5 mg, 2.5 mg, Nebulization, Q6H, Lisa Claudio MD, 2.5 mg at 11/23/21 1801    ondansetron (ZOFRAN) injection 4 mg, 4 mg, IntraVENous, Q6H PRN, Mando Anand DO, 4 mg at 11/19/21 0956    lactobacillus (CULTURELLE) capsule 1 capsule, 1 capsule, Per NG tube, Daily with breakfast, Minerva Anand DO, 1 capsule at 11/23/21 0928    piperacillin-tazobactam (ZOSYN) 3,375 mg in dextrose 5 % 50 mL IVPB extended infusion (mini-bag), 3,375 mg, IntraVENous, Q12H, Kalee Bis, DO, Stopped at 11/23/21 1600    chlorhexidine (PERIDEX) 0.12 % solution 15 mL, 15 mL, Mouth/Throat, BID, LANE Cordero - CNP, 15 mL at 11/23/21 0928    ketamine (KETALAR) injection 100 mg, 100 mg, IntraVENous, Once, Kalee Herrera, DO    lidocaine PF 1 % injection 5 mL, 5 mL, IntraDERmal, Once, LANE Cordero - CNP    insulin lispro (HUMALOG) injection vial 0-6 Units, 0-6 Units, SubCUTAneous, Q6H, LANE Diaz - CNP, 1 Units at 11/23/21 0540    albuterol (PROVENTIL) nebulizer solution 2.5 mg, 2.5 mg, Nebulization, Q4H PRN, Cindy Trejo MD, 2.5 mg at 11/13/21 0426    allopurinol (ZYLOPRIM) tablet 100 mg, 100 mg, Oral, Daily, Herlinda Cleaning DO, 100 mg at 11/23/21 9033    phosphorus replacement protocol, , Other, RX Placeholder, Kalee Bis, DO    fentaNYL (SUBLIMAZE) injection 25 mcg, 25 mcg, IntraVENous, Q1H PRN, Kalee Bis, DO, 25 mcg at 11/21/21 0525    potassium bicarb-citric acid (EFFER-K) effervescent tablet 40 mEq, 40 mEq, Oral, Once, Kalee Bis, DO    glucose (GLUTOSE) 40 % oral gel 15 g, 15 g, Oral, PRN, Virginia Anand DO    dextrose 50 % IV solution, 12.5 g, IntraVENous, PRN, [place] Anand,     glucagon (rDNA) injection 1 mg, 1 mg, IntraMUSCular, PRN, [place] Anand,     dextrose 5 % solution, 100 mL/hr, IntraVENous, PRN, Minerva Kimberly Anand DO    tiotropium-olodaterol (STIOLTO) 2.5-2.5 MCG/ACT inhaler 2 puff, 2 puff, Inhalation, Daily, Rad Anand DO, 2 puff at 11/23/21 0844    polyethylene glycol (GLYCOLAX) packet 17 g, 17 g, Oral, Daily PRN, Minerva Anand DO, 17 g at 11/16/21 1705    [Held by provider] 7/16/21  Yes Renee Berger MD   FLUoxetine (PROZAC) 40 MG capsule Take 1 capsule by mouth daily 7/16/21  Yes Renee Berger MD   potassium chloride (KLOR-CON M) 10 MEQ extended release tablet Take 1 tablet by mouth every other day 5/16/21  Yes LANE Alarcon CNP   bumetanide (BUMEX) 1 MG tablet Take 1 tablet by mouth daily 5/16/21  Yes LANE Alarcon CNP   Multiple Vitamins-Minerals (MULTIVITAMIN WOMEN PO) Take 1 tablet by mouth daily   Yes Historical Provider, MD   acetaminophen (TYLENOL) 650 MG extended release tablet Take 650 mg by mouth every 8 hours as needed for Pain   Yes Historical Provider, MD   Riociguat (ADEMPAS) 2.5 MG TABS Take 2.5 mg by mouth 3 times daily   Yes Historical Provider, MD   docusate sodium (COLACE) 100 MG capsule Take 100 mg by mouth as needed    Yes Historical Provider, MD   aspirin 81 MG tablet Take 81 mg by mouth daily    Yes Historical Provider, MD     Additional information:       PHYSICAL:    height is 4' 9\" (1.448 m) and weight is 165 lb 12.6 oz (75.2 kg). Her oral temperature is 98.4 °F (36.9 °C). Her blood pressure is 133/63 and her pulse is 102. Her respiration is 18 and oxygen saturation is 96%.    Heart:  [x]Regular rate and rhythm  []Other:    Lungs:  [x]Clear    []Other:    Abdomen: [x]Soft    []Other:    Mental Status: [x]Alert & Oriented  []Other:      VITAL SIGNS   Patient Vitals for the past 24 hrs:   BP Temp Temp src Pulse Resp SpO2 Weight   11/23/21 1827 133/63 98.4 °F (36.9 °C) Oral 102 18 96 % --   11/23/21 1815 (!) 134/57 97.9 °F (36.6 °C) -- 100 18 -- 165 lb 12.6 oz (75.2 kg)   11/23/21 1806 -- -- -- -- -- 98 % --   11/23/21 1456 128/60 98.1 °F (36.7 °C) -- 96 -- 98 % 171 lb 4.8 oz (77.7 kg)   11/23/21 1213 126/61 98.7 °F (37.1 °C) Oral 88 20 98 % --   11/23/21 0921 (!) 118/57 98 °F (36.7 °C) Oral 92 20 97 % --   11/23/21 0835 -- -- -- 85 19 96 % --   11/23/21 0609 -- -- -- -- -- 96 % --   11/23/21 0429 -- -- -- -- -- -- 170 lb (77.1 kg) 11/23/21 0321 (!) 140/65 98.6 °F (37 °C) Oral 110 20 96 % --   11/23/21 0030 -- -- -- 103 17 95 % --   11/22/21 2315 137/64 98.4 °F (36.9 °C) Oral 91 16 -- --   11/22/21 2055 131/61 97.7 °F (36.5 °C) Oral 92 17 97 % --   11/22/21 2022 -- -- -- 84 16 97 % --       PLANNED PROCEDURE   [x]EGD With PEG []Colonoscopy []Flex Sigmoid  []ERCP []EUS   []Cystoscopy  [] CATH [] BRONCH     Consent: I have discussed with the patient and/or the patient representative the indication, alternatives, and the possible risks and/or complications of the planned procedure and the anesthesia methods. The patient and/or patient representative appear to understand and agree to proceed. SEDATION ( see Anesthesia note)    ASA Classification: Class 4 - A patient with an incapacitating systemic disease that is a constant threat to life    Airway Assessment: normal    Monitoring and Safety: The patient will be placed on a cardiac monitor and vital signs, pulse oximetry and level of consciousness will be continuously evaluated throughout the procedure. The patient will be closely monitored until recovery from the medications is complete and the patient has returned to baseline status. Respiratory therapy will be on standby during the procedure. [x]Pre-procedure diagnostic studies complete and results available. Comment:    [x]Previous sedation/anesthesia experiences assessed. Comment:    [x]The patient is an appropriate candidate to undergo the planned procedure sedation and anesthesia. (Refer to nursing sedation/analgesia documentation record)  [x]Formulation and discussion of sedation/procedure plan, risks, and expectations with patient and/or responsible adult completed. [x]Patient examined immediately prior to the procedure.  (Refer to nursing sedation/analgesia documentation record)    Malik Quijano MD   Electronically signed

## 2021-11-24 NOTE — PROGRESS NOTES
Notified team of low potassium at 3.4 with no replacement protocol ordered at this time, pharmacy spoke up and stated they will handle that.

## 2021-11-24 NOTE — PROGRESS NOTES
[address]  OCCUPATIONAL THERAPY MISSED TREATMENT NOTE  [facility] ENDOSCOPY  [facility] ENDO POOL RM/NONE      Date: 2021  Patient Name: Lyssa Longoria        CSN: [id number]   : 1950  (73 y.o.)  Gender: female   Referring Practitioner: Placido Frankel, DO  Diagnosis: Acute respiratory failure         REASON FOR MISSED TREATMENT: Patient off floor for peg tube placement.   Will attempt at next available time

## 2021-11-25 LAB
ANION GAP SERPL CALCULATED.3IONS-SCNC: 12 MEQ/L (ref 8–16)
BASOPHILS # BLD: 1 %
BASOPHILS ABSOLUTE: 0.1 THOU/MM3 (ref 0–0.1)
BUN BLDV-MCNC: 41 MG/DL (ref 7–22)
CALCIUM IONIZED: 1.18 MMOL/L (ref 1.12–1.32)
CALCIUM SERPL-MCNC: 8.7 MG/DL (ref 8.5–10.5)
CHLORIDE BLD-SCNC: 94 MEQ/L (ref 98–111)
CO2: 23 MEQ/L (ref 23–33)
CREAT SERPL-MCNC: 3.2 MG/DL (ref 0.4–1.2)
EOSINOPHIL # BLD: 3.3 %
EOSINOPHILS ABSOLUTE: 0.3 THOU/MM3 (ref 0–0.4)
ERYTHROCYTE [DISTWIDTH] IN BLOOD BY AUTOMATED COUNT: 16.5 % (ref 11.5–14.5)
ERYTHROCYTE [DISTWIDTH] IN BLOOD BY AUTOMATED COUNT: 56.6 FL (ref 35–45)
GFR SERPL CREATININE-BSD FRML MDRD: 14 ML/MIN/1.73M2
GLUCOSE BLD-MCNC: 101 MG/DL (ref 70–108)
GLUCOSE BLD-MCNC: 105 MG/DL (ref 70–108)
GLUCOSE BLD-MCNC: 106 MG/DL (ref 70–108)
GLUCOSE BLD-MCNC: 107 MG/DL (ref 70–108)
GLUCOSE BLD-MCNC: 110 MG/DL (ref 70–108)
GLUCOSE BLD-MCNC: 111 MG/DL (ref 70–108)
HCT VFR BLD CALC: 26.1 % (ref 37–47)
HEMOGLOBIN: 8.3 GM/DL (ref 12–16)
IMMATURE GRANS (ABS): 0.05 THOU/MM3 (ref 0–0.07)
IMMATURE GRANULOCYTES: 0.6 %
LYMPHOCYTES # BLD: 5.7 %
LYMPHOCYTES ABSOLUTE: 0.5 THOU/MM3 (ref 1–4.8)
MCH RBC QN AUTO: 30.1 PG (ref 26–33)
MCHC RBC AUTO-ENTMCNC: 31.8 GM/DL (ref 32.2–35.5)
MCV RBC AUTO: 94.6 FL (ref 81–99)
MONOCYTES # BLD: 8.8 %
MONOCYTES ABSOLUTE: 0.7 THOU/MM3 (ref 0.4–1.3)
NUCLEATED RED BLOOD CELLS: 0 /100 WBC
PLATELET # BLD: 203 THOU/MM3 (ref 130–400)
PMV BLD AUTO: 9.1 FL (ref 9.4–12.4)
POTASSIUM SERPL-SCNC: 4 MEQ/L (ref 3.5–5.2)
RBC # BLD: 2.76 MILL/MM3 (ref 4.2–5.4)
SEG NEUTROPHILS: 80.6 %
SEGMENTED NEUTROPHILS ABSOLUTE COUNT: 6.4 THOU/MM3 (ref 1.8–7.7)
SODIUM BLD-SCNC: 129 MEQ/L (ref 135–145)
WBC # BLD: 7.9 THOU/MM3 (ref 4.8–10.8)

## 2021-11-25 PROCEDURE — 80048 BASIC METABOLIC PNL TOTAL CA: CPT

## 2021-11-25 PROCEDURE — 2580000003 HC RX 258: Performed by: INTERNAL MEDICINE

## 2021-11-25 PROCEDURE — 6360000002 HC RX W HCPCS: Performed by: FAMILY MEDICINE

## 2021-11-25 PROCEDURE — 94761 N-INVAS EAR/PLS OXIMETRY MLT: CPT

## 2021-11-25 PROCEDURE — 85025 COMPLETE CBC W/AUTO DIFF WBC: CPT

## 2021-11-25 PROCEDURE — C9113 INJ PANTOPRAZOLE SODIUM, VIA: HCPCS | Performed by: NURSE PRACTITIONER

## 2021-11-25 PROCEDURE — 6360000002 HC RX W HCPCS: Performed by: PHARMACIST

## 2021-11-25 PROCEDURE — 2700000000 HC OXYGEN THERAPY PER DAY

## 2021-11-25 PROCEDURE — 99232 SBSQ HOSP IP/OBS MODERATE 35: CPT | Performed by: INTERNAL MEDICINE

## 2021-11-25 PROCEDURE — 6370000000 HC RX 637 (ALT 250 FOR IP): Performed by: STUDENT IN AN ORGANIZED HEALTH CARE EDUCATION/TRAINING PROGRAM

## 2021-11-25 PROCEDURE — 2580000003 HC RX 258: Performed by: STUDENT IN AN ORGANIZED HEALTH CARE EDUCATION/TRAINING PROGRAM

## 2021-11-25 PROCEDURE — 6360000002 HC RX W HCPCS: Performed by: STUDENT IN AN ORGANIZED HEALTH CARE EDUCATION/TRAINING PROGRAM

## 2021-11-25 PROCEDURE — 82948 REAGENT STRIP/BLOOD GLUCOSE: CPT

## 2021-11-25 PROCEDURE — 36592 COLLECT BLOOD FROM PICC: CPT

## 2021-11-25 PROCEDURE — 36415 COLL VENOUS BLD VENIPUNCTURE: CPT

## 2021-11-25 PROCEDURE — 6370000000 HC RX 637 (ALT 250 FOR IP): Performed by: FAMILY MEDICINE

## 2021-11-25 PROCEDURE — 6370000000 HC RX 637 (ALT 250 FOR IP): Performed by: NURSE PRACTITIONER

## 2021-11-25 PROCEDURE — 2060000000 HC ICU INTERMEDIATE R&B

## 2021-11-25 PROCEDURE — 82330 ASSAY OF CALCIUM: CPT

## 2021-11-25 PROCEDURE — 99233 SBSQ HOSP IP/OBS HIGH 50: CPT | Performed by: INTERNAL MEDICINE

## 2021-11-25 PROCEDURE — 6360000002 HC RX W HCPCS: Performed by: NURSE PRACTITIONER

## 2021-11-25 PROCEDURE — 94640 AIRWAY INHALATION TREATMENT: CPT

## 2021-11-25 PROCEDURE — 6360000002 HC RX W HCPCS: Performed by: INTERNAL MEDICINE

## 2021-11-25 PROCEDURE — 6370000000 HC RX 637 (ALT 250 FOR IP): Performed by: INTERNAL MEDICINE

## 2021-11-25 PROCEDURE — 94003 VENT MGMT INPAT SUBQ DAY: CPT

## 2021-11-25 RX ADMIN — CHLORHEXIDINE GLUCONATE 15 ML: 1.2 RINSE ORAL at 20:14

## 2021-11-25 RX ADMIN — PANTOPRAZOLE SODIUM 40 MG: 40 INJECTION, POWDER, FOR SOLUTION INTRAVENOUS at 08:20

## 2021-11-25 RX ADMIN — ALBUTEROL SULFATE 2.5 MG: 2.5 SOLUTION RESPIRATORY (INHALATION) at 17:34

## 2021-11-25 RX ADMIN — Medication 1 CAPSULE: at 08:20

## 2021-11-25 RX ADMIN — ALBUTEROL SULFATE 2.5 MG: 2.5 SOLUTION RESPIRATORY (INHALATION) at 00:51

## 2021-11-25 RX ADMIN — METOCLOPRAMIDE HYDROCHLORIDE 5 MG: 5 INJECTION INTRAMUSCULAR; INTRAVENOUS at 18:17

## 2021-11-25 RX ADMIN — METOCLOPRAMIDE HYDROCHLORIDE 5 MG: 5 INJECTION INTRAMUSCULAR; INTRAVENOUS at 08:20

## 2021-11-25 RX ADMIN — SODIUM HYPOCHLORITE: 1.25 SOLUTION TOPICAL at 08:20

## 2021-11-25 RX ADMIN — ALBUTEROL SULFATE 2.5 MG: 2.5 SOLUTION RESPIRATORY (INHALATION) at 12:24

## 2021-11-25 RX ADMIN — FLUOXETINE 40 MG: 20 CAPSULE ORAL at 08:20

## 2021-11-25 RX ADMIN — CHLORHEXIDINE GLUCONATE 15 ML: 1.2 RINSE ORAL at 08:20

## 2021-11-25 RX ADMIN — LORAZEPAM 1 MG: 2 INJECTION INTRAMUSCULAR; INTRAVENOUS at 21:14

## 2021-11-25 RX ADMIN — SODIUM CHLORIDE, PRESERVATIVE FREE 10 ML: 5 INJECTION INTRAVENOUS at 20:15

## 2021-11-25 RX ADMIN — ALBUTEROL SULFATE 2.5 MG: 2.5 SOLUTION RESPIRATORY (INHALATION) at 06:15

## 2021-11-25 RX ADMIN — TIOTROPIUM BROMIDE AND OLODATEROL 2 PUFF: 3.124; 2.736 SPRAY, METERED RESPIRATORY (INHALATION) at 06:31

## 2021-11-25 RX ADMIN — SODIUM CHLORIDE SOLN NEBU 3% 4 ML: 3 NEBU SOLN at 17:34

## 2021-11-25 RX ADMIN — SODIUM CHLORIDE, PRESERVATIVE FREE 10 ML: 5 INJECTION INTRAVENOUS at 08:20

## 2021-11-25 RX ADMIN — ALLOPURINOL 100 MG: 100 TABLET ORAL at 08:20

## 2021-11-25 RX ADMIN — PIPERACILLIN AND TAZOBACTAM 3375 MG: 3; .375 INJECTION, POWDER, LYOPHILIZED, FOR SOLUTION INTRAVENOUS at 23:07

## 2021-11-25 RX ADMIN — METOCLOPRAMIDE HYDROCHLORIDE 5 MG: 5 INJECTION INTRAMUSCULAR; INTRAVENOUS at 01:42

## 2021-11-25 RX ADMIN — PIPERACILLIN AND TAZOBACTAM 3375 MG: 3; .375 INJECTION, POWDER, LYOPHILIZED, FOR SOLUTION INTRAVENOUS at 11:46

## 2021-11-25 ASSESSMENT — PAIN SCALES - GENERAL
PAINLEVEL_OUTOF10: 0

## 2021-11-25 ASSESSMENT — PULMONARY FUNCTION TESTS
PIF_VALUE: 17
PIF_VALUE: 19
PIF_VALUE: 18
PIF_VALUE: 17
PIF_VALUE: 16
PIF_VALUE: 18

## 2021-11-25 NOTE — PROGRESS NOTES
[place] [facility]      Patient - Lyssa Longoria   MRN -  [id number]   [facility] # - [de-identified]   - 1950      Date of Admission -  10/27/2021  1:36 PM  Date of evaluation -  2021  Room - -[facility]   Hospital Day - 34  Consulting - Vicenta Renteria MD Primary Care Physician - Nereida Lamb MD     Problem List      Active Hospital Problems    Diagnosis Date Noted    Paroxysmal atrial fibrillation ([address] [I48.0] 2021    Hemoptysis [R04.2]     Chronic respiratory failure with hypoxia (HCC) [J96.11]     Aspiration pneumonia of left lung ([address] [J69.0]     Pleural effusion [J90]     Shock ([address] [R57.9]     Flash pulmonary edema ([address] [J81.0]     Acute respiratory failure ([address] [J96.00] 10/27/2021    Pressure ulcer of right buttock, stage 3 ([address] [L89.313] 2021    Pulmonary HTN ([address] [I27.20] 2019     Reason for Consult    Vent management, Hemoptysis  HPI   History Obtained From: Patient's niece at bedside and electronic medical record. Lyssa Longoria is a 73 y.o. female never smoker with PMHx chronic diastolic CHF (EF 44-98%, B1QG per TTE 21) / NYHA II / chronic RV failure / severe PAH (PA 85/31 - mean 52 / PVR 10), mild AS (valve area 1.7 sq cm), morbid obesity, HTN and depression -- presents to [facility] with a chief complaint of shortness of breath. History obtained from the EMR (patient intubated / sedated on the ventilator).     Patient presented to [facility] ED after her sister noticed the patient was experiencing worsening shortness of breath throughout the day / recorded SpO2 51% on home pulse oximeter prior to arrival. The patient has a known history of CHF and PAH, for which she follows with Dr. Murphy Jacobson (cardiology) and [facility] [address] / currently on adempas. Per report, she was recently discontinued from oral bumex, which she stopped taking just this morning.  She endorsed mild headache on arrival (poorly characterized), but no associated fevers/chills, chest pain, palpitations, cough, n/v/d, abdominal pain, urinary symptoms, weakness or syncope were reported. Limited additional history available. Per patient's niece the patient was sedentary at home before admission to the hospital.     Patient was intubated on 10/27/21. Extubated following successful SBT on 11/9/21. Status post bronchoscopy with mucous plug removal from the left mainstem on 11/13. Failed HFNC and was reintubated on 11/15/21. Tracheostomy tube was placed on 11/17/21. She is having shortness of breath: No  Functional status prior to beginning of symptoms: 0 block/s on level ground. Current functional capacity on level ground: 0 block/s on level ground. She can climb steps: No  Flights of steps she can climb: 0    She is having cough: Yes  Duration of cough: for 30+ days.    Her cough is associated with sputum production: Yes   The sputum color: white  Hemoptysis:Yes    She is having chest pain:No      Past 24 hrs   -Was on PSV/CPAP 12/6 all night, very well tolerated  -FiO2 30%  -Successful PEG placement yesterday  -No Hemoptysis    All other systems reviewed    PMHx   Past Medical History      Diagnosis Date    CHF (congestive heart failure) ([facility])     Dr. Jeyson Proctor Depression     Gout attack     Hypertension     Osteoarthritis     Pulmonary artery hypertension ([facility]     [facility]-- Dr. Chávez [facility]-- Dr. Jeyson Proctor Renal calculi     Dr. Matheus [facility])       Past Surgical History        Procedure Laterality Date    APPENDECTOMY  1960    BRONCHOSCOPY N/A 11/22/2021    BRONCHOSCOPY performed by Polo Hope MD at [facility]   [address] [place] [address]    IR NEPHROSTOMY PERCUTANEOUS LEFT  11/1/2021    IR NEPHROSTOMY PERCUTANEOUS LEFT 11/1/2021 Annemarie Rodríguez MD [facility] SPECIAL PROCEDURES    PRESSURE ULCER DEBRIDEMENT Right 8/6/2021    EXCISION RIGHT BUTTOCK WOUND AND CLOSURE performed by Arturo Brown MD at [facility]    Current Medications    sodium chloride flush  5-40 mL IntraVENous 2 times per day    metoclopramide  5 mg IntraVENous Q8H    sodium chloride (Inhalant)  4 mL Nebulization Q6H    And    albuterol  2.5 mg Nebulization Q6H    lactobacillus  1 capsule Per NG tube Daily with breakfast    piperacillin-tazobactam  3,375 mg IntraVENous Q12H    chlorhexidine  15 mL Mouth/Throat BID    ketamine  100 mg IntraVENous Once    lidocaine 1 % injection  5 mL IntraDERmal Once    insulin lispro  0-6 Units SubCUTAneous Q6H    allopurinol  100 mg Oral Daily    phosphorus replacement protocol   Other RX Placeholder    potassium bicarb-citric acid  40 mEq Oral Once    tiotropium-olodaterol  2 puff Inhalation Daily    [Held by provider] aspirin  81 mg Oral Daily    pantoprazole  40 mg IntraVENous QAM    sodium hypochlorite   Irrigation Daily    Riociguat  2.5 mg Oral Q8H    FLUoxetine  40 mg Oral Daily    [Held by provider] metoprolol tartrate  12.5 mg Oral BID     sodium chloride flush, sodium chloride, LORazepam, lidocaine PF, ondansetron, albuterol, fentanNYL, glucose, dextrose, glucagon (rDNA), dextrose, polyethylene glycol, senna, acetaminophen  IV Drips/Infusions   sodium chloride      dextrose Stopped (11/25/21 0336)    [Held by provider] sodium chloride 50 mL/hr at 10/28/21 0592     Home Medications  Medications Prior to Admission: metoprolol tartrate (LOPRESSOR) 25 MG tablet, Take 0.5 tablets by mouth 2 times daily  sodium hypochlorite (DAKINS) 0.125 % SOLN external solution, Apply Dakin's moistened gauze dressings to wound twice daily and as needed.   sodium chloride 1 g tablet, Take 1 tablet by mouth 2 times daily  allopurinol (ZYLOPRIM) 300 MG tablet, Take 1 tablet by mouth daily  FLUoxetine (PROZAC) 40 MG capsule, Take 1 capsule by mouth daily  potassium chloride (KLOR-CON M) 10 MEQ extended release tablet, Take 1 tablet by mouth every other day  bumetanide (BUMEX) 1 MG tablet, Take 1 tablet by mouth daily  Multiple Vitamins-Minerals (MULTIVITAMIN WOMEN PO), Take 1 tablet by mouth daily  acetaminophen (TYLENOL) 650 MG extended release tablet, Take 650 mg by mouth every 8 hours as needed for Pain  Riociguat (ADEMPAS) 2.5 MG TABS, Take 2.5 mg by mouth 3 times daily  docusate sodium (COLACE) 100 MG capsule, Take 100 mg by mouth as needed   aspirin 81 MG tablet, Take 81 mg by mouth daily   Diet    Diet NPO  Allergies    Patient has no known allergies. Social History     Social History     Socioeconomic History    Marital status: Single     Spouse name: Not on file    Number of children: 0    Years of education: Not on file    Highest education level: Not on file   Occupational History    Not on file   Tobacco Use    Smoking status: Never Smoker    Smokeless tobacco: Never Used   Vaping Use    Vaping Use: Never used   Substance and Sexual Activity    Alcohol use: No    Drug use: No    Sexual activity: Not Currently   Other Topics Concern    Not on file   Social History Narrative    Not on file     Social Determinants of Health     Financial Resource Strain: Low Risk     Difficulty of Paying Living Expenses: Not very hard   Food Insecurity: No Food Insecurity    Worried About Running Out of Food in the Last Year: Never true    Traci of Food in the Last Year: Never true   Transportation Needs:     Lack of Transportation (Medical): Not on file    Lack of Transportation (Non-Medical):  Not on file   Physical Activity:     Days of Exercise per Week: Not on file    Minutes of Exercise per Session: Not on file   Stress:     Feeling of Stress : Not on file   Social Connections:     Frequency of Communication with Friends and Family: Not on file    Frequency of Social Gatherings with Friends and Family: Not on file    Attends Anglican Services: Not on file    Active Member of Clubs or Organizations: Not on file    Attends Club or Organization Meetings: Not on file    Marital Status: Not on file   Intimate Partner Violence:     Fear of Current or Ex-Partner: Not on file    Emotionally Abused: Not on file    Physically Abused: Not on file    Sexually Abused: Not on file   Housing Stability:     Unable to Pay for Housing in the Last Year: Not on file    Number of Places Lived in the Last Year: Not on file    Unstable Housing in the Last Year: Not on file     Family History          Problem Relation Age of Onset    Diabetes Father    Prince Chawla Arthritis Mother     COPD Mother     Diabetes Sister     Heart Disease Maternal Uncle     Breast Cancer Niece 36    Sleep Apnea Brother     Asthma Neg Hx     Birth Defects Neg Hx     Cancer Neg Hx     Depression Neg Hx     Early Death Neg Hx     Hearing Loss Neg Hx     High Blood Pressure Neg Hx     High Cholesterol Neg Hx     Kidney Disease Neg Hx     Learning Disabilities Neg Hx     Mental Illness Neg Hx     Mental Retardation Neg Hx     Miscarriages / Stillbirths Neg Hx     Stroke Neg Hx     Substance Abuse Neg Hx     Vision Loss Neg Hx     Other Neg Hx      Sleep History    Never diagnosed with sleep apnea in the past.  Occupational history   Occupation:  She is current working: No  Type of profession: unemployed, disabled. History of tobacco smoking:No     History of recreational or IV drug use in the past:NO     History of exposure to coal mines/coal dust: NO  History of exposure to foundry dust/welding: NO  History of exposure to quarry/silica/sandblasting: NO  History of exposure to asbestos/working with breaks/ships: NO  History of exposure to farm dust: NO  History of recent travel to long distances: NO  History of exposure to birds, pigeons, or chickens in the past:NO    History of pulmonary embolism in the past: No            History of DVT in the past:No              Vitals     height is 4' 9\" (1.448 m) and weight is 169 lb 12.1 oz (77 kg). Her axillary temperature is 98.8 °F (37.1 °C).  Her blood pressure is 127/60 and her pulse is 97. Her respiration is 19 and oxygen saturation is 97%. Body mass index is 36.73 kg/m². SUPPLEMENTAL O2: O2 Flow Rate (L/min): 16 L/min     I/O        Intake/Output Summary (Last 24 hours) at 11/25/2021 1211  Last data filed at 11/25/2021 0926  Gross per 24 hour   Intake 1135 ml   Output 405 ml   Net 730 ml     I/O last 3 completed shifts: In: 9047 [I.V.:957.3; NG/GT:390; IV Piggyback:95.7]  Out: 415 [Urine:155; Stool:250; Blood:10]   Patient Vitals for the past 96 hrs (Last 3 readings):   Weight   11/25/21 0314 169 lb 12.1 oz (77 kg)   11/24/21 0356 169 lb 12.1 oz (77 kg)   11/23/21 1815 165 lb 12.6 oz (75.2 kg)       Exam   Physical Exam   Constitutional: No distress on vent via trach. Patient appears moderately built. Head: Normocephalic and atraumatic. Mouth/Throat: Oropharynx is clear and moist.  No oral thrush. Eyes: Conjunctivae are normal. Pupils are equal, round. No scleral icterus. Neck: Neck supple. 7.5 mm Bivona portex in place  Cardiovascular: S1 and S2 with no murmur. No peripheral edema  Pulmonary/Chest: Normal effort with bilateral air entry, clear breath sounds. No stridor. No respiratory distress. Patient exhibits no tenderness. Abdominal: Soft. Bowel sounds audible. No distension or tenderness to palp.    Musculoskeletal: Moves all extremities  Neurological: Patient is alert and follows simple commands      Labs  - Old records and notes have been reviewed in [facility]   ABG  Lab Results   Component Value Date    PH 7.36 11/13/2021    PO2 59 11/13/2021    PCO2 45 11/13/2021    HCO3 26 11/13/2021    O2SAT 89 11/13/2021     Lab Results   Component Value Date    IFIO2 70 11/13/2021    MODE PC 11/05/2021    SETPEEP 8.0 11/15/2021     CBC  Recent Labs     11/23/21  0400 11/25/21  0555   WBC 4.8 7.9   RBC 2.97* 2.76*   HGB 8.7* 8.3*   HCT 27.6* 26.1*   MCV 92.9 94.6   MCH 29.3 30.1   MCHC 31.5* 31.8*    203   MPV 9.8 9.1*      BMP  Recent Labs     11/23/21  0400 11/24/21  0640 11/25/21  0555   * 133* 129*   K 4.0 3.4* 4.0   CL 88* 96* 94*   CO2 21* 23 23   BUN 54* 34* 41*   CREATININE 3.5* 2.7* 3.2*   GLUCOSE 166* 82 101   MG 1.8  --   --    PHOS 4.3  --   --    CALCIUM 8.5 8.5 8.7     LFT  Recent Labs     11/23/21  0400   AST 11   ALT 13   BILITOT 0.3   ALKPHOS 115     TROP  Lab Results   Component Value Date    TROPONINT 0.084 10/28/2021    TROPONINT 0.059 10/27/2021    TROPONINT 0.037 03/06/2020     BNP  No results for input(s): BNP in the last 72 hours. Lactic Acid  No results for input(s): LACTA in the last 72 hours. INR  No results for input(s): INR, PROTIME in the last 72 hours. PTT  No results for input(s): APTT in the last 72 hours. Glucose  Recent Labs     11/25/21  0145 11/25/21  0533 11/25/21  1136   POCGLU 111* 106 107     UA No results for input(s): SPECGRAV, PHUR, COLORU, CLARITYU, MUCUS, PROTEINU, BLOODU, RBCUA, WBCUA, BACTERIA, NITRU, GLUCOSEU, BILIRUBINUR, UROBILINOGEN, KETUA, LABCAST, LABCASTTY, AMORPHOS in the last 72 hours. Invalid input(s): CRYSTALS. PFTs           Sleep studies   Study Results  Initial Study Date -  11/7/19  AHI -  5.8   TotalEvents - 32  (Apneas  19  Hypopneas 13  Central  0)  LM w/Arousals - 0  Sleep Efficiency - 70.8 % (Total Sleep Time - 330 min)  Time with Sats below 88% - 0 min    Cultures    -Anaerobic and aerobic culture: (From Coccyx) Positive for Proteus mirabilis and Pseudomonas aeruginosa, culture also yielded very light growth of Staphylococcus species (coagulase negative), and additional enteric gram negative bacilli  -Respiratory culture on 11/11/21 of bronchial washing:No bacteria seen.    -Molecular pneumonia panel of sputum on 11/15/2021: Negative  -AFB culture with smear: negative  -pneumonia panel of bronchial washings 11/13/2021: Positive for staph aureus and resistant gene meca/c & mrej  -Body fluid culture on 11/5/2021: positive for Staph epidermidis, very light growth Isolates of Methicillin Resistant Staphylococcus coagulase negative (MRSE)  -Respiratory culture on 11/5/2021: Positive for staphylococcal aureus,  EKG     Echocardiogram   Complete 2D ECHO with doppler with color 10/27/21:  Mitral Valve   The mitral valve structure was normal with normal leaflet separation. DOPPLER: The transmitral velocity was within the normal range with no   evidence for mitral stenosis. There was no evidence of mitral   regurgitation. Aortic Valve   The aortic valve was trileaflet with normal thickness and cuspal   separation. DOPPLER: Transaortic velocity was within the normal range with   no evidence of aortic stenosis. There was no evidence of aortic   regurgitation. Tricuspid Valve   The tricuspid valve structure was normal with normal leaflet separation. DOPPLER: There was no evidence of tricuspid stenosis. There was trace   tricuspid regurgitation. Pulmonic Valve   The pulmonic valve leaflets exhibited normal thickness, no calcification,   and normal cuspal separation. DOPPLER: The transpulmonic velocity was   within the normal range with no evidence for regurgitation. Left Atrium   Left atrial size was normal.      Left Ventricle   Normal left ventricular size and systolic function. There were no regional wall motion abnormalities. Wall thickness was within normal limits. Ejection fraction was estimated at 60-65%. Right Atrium   Right atrial size was normal.      Right Ventricle   The right ventricular size was normal with normal systolic function and   wall thickness. Pericardial Effusion   The pericardium was normal in appearance with a small, non-hemodynamically   significant pericardial effusion. Prominent pericardial fat pad. Pleural Effusion   No evidence of pleural effusion. Aorta / Great Vessels   IVC size is dilated with reduced respiratory phasic changes (CVP~10-15   mmHg).     Radiology    CXR  PORT CXR 11/19/21:  There is a Dobbhoff tube which projects over the stomach. There is a left-sided percutaneous nephrostomy tube, stable compared to prior CT. 1. Mild hepatomegaly. Left subclavian dialysis catheter with tip at cavoatrial junction. NG tube passes into stomach. Tracheostomy tube in good position. Right jugular line with catheter tip in right atrium. 2. Tiny bilateral pleural effusions. Moderate pneumonia/pulmonary edema scattered relatively diffusely in both lungs. 3. Overall appearance of chest has worsened somewhat since prior.       CT Scans  (See actual reports for details)    CT chest without contrast on 11/1/21: There are moderate bilateral pleural effusions which have mildly increased in size in the interval. There is adjacent atelectasis. Pulmonary vessels are prominent, similar to prior exam. There are groundglass opacities adjacent to the pulmonary vessels. The heart is enlarged, similar to prior exam. There is a small pericardial effusion, decreased compared to prior exam. Redemonstration of percutaneous cardiac pacer leads and enteric tube. The endotracheal tube is stable with tip in the distal trachea. There is marked thyromegaly which is nodular in appearance, stable compared to prior exam. Visualized upper abdominal solid organs are grossly unremarkable. There are stable degenerative changes of the thoracic spine with exaggerated thoracic kyphosis.         Assessment   -Hemoptysis in the setting of tracheostomy tube--resolved  -Acute hypoxic respiratory failure secondary to severe multi-organism pneumonia with bilateral pleural effusion and repeated mucous plugging   Intubated 10/27/21, extubated 11/9/21, reintubated 11/15/21, tracheostomy tube placed 11/17/21  -S/p bronchoscopy with mucous plug removal from the left mainstem on 11/13/21 by Dr. Brigida Mayers in ICU  -Bilateral pleural effusions   -Severe bilateral multiorganism pneumonia: received 14 days of vancomycin, 5 days of rocephin, clinidamycin for 8 days   ANCA associated vasculitis: elevated anti MPO elevated 416 and serine proteinase 3 IgG WNL  -Stage II ALVIN on CKD 3 on HD started 11/20/21  -Hydronephrosis 2/2 Left-sided staghorn calculi  -Acute blood loss anemia: PRBC x1 transfused 10/31/21, 11/4/21, 11/5/21, x2 11/6/21, x3 11/8/21, x3 11/15/21. Heparin stopped (dialysis dose last ran on 11/11/21). -Thrombocytopenia   -Hematuria   -BARBER noncompliant with CPAP     Plan   -Wean to TC as tolerated--discussed with RRT  -Aspiration precautions   -Cautious use of tracheal suction to reduce risk of suction trauma   -Wean FiO2 as tolerated to maintain saturation above 90%, SBT tomorrow will hold on trial today as she is having PEG tube placement  -Nursing communication order to remove panic sutures after PEG tube placement  -Antibiotics per primary   -GI following planning PEG tube placement 11/24/2021    Questions and concerns addressed.     Electronically signed by   Paula Zarate MD on 11/25/2021 at 12:11 PM

## 2021-11-25 NOTE — PROGRESS NOTES
Gastroenterology Progress Note:     Patient Name:  Karen Barbosa   MRN: [id number]  [id number]  YOB: 1950  Admit Date: 10/27/2021  1:36 PM  Primary Care Physician: Butch Vega MD   [id number]     Patient seen and examined. 24 hours events and chart reviewed. Subjective: Had peg placement yesterday. Upon visit, patient sedation on ventilation. Patient was tolerate enteral feeding at goal 30ml. Objective:  BP (!) 137/53   Pulse 97   Temp 97.7 °F (36.5 °C) (Axillary)   Resp 19   Ht 4' 9\" (1.448 m)   Wt 169 lb 12.1 oz (77 kg)   SpO2 96%   BMI 36.73 kg/m²     Physical Exam:    General:  Nourished in no distress  HEENT: Atraumatic, normocephalic. Moist oral mucous membranes. Neck: Supple without adenopathy, JVD, thyromegaly or masses. Trachea midline. CV: Heart RRR, no murmurs, rubs, gallops. Resp: Even, easy without cough or accessory use. Lungs clear to ascultation bilaterally. Abd: Round, soft, nontender. No hepatosplenomegaly or mass present. Active bowel sounds heard. No distention noted. Abdominal binder in place, Peg tube dry and intact at 3 cm from the anchor. Ext:  Without cyanosis, clubbing, edema.    Skin: Pink, warm, dry  Neuro:  Can't eval due to patient is sedative on ventilation  Rectal: deferred    Labs:   CBC:   Lab Results   Component Value Date    WBC 7.9 11/25/2021    HGB 8.3 11/25/2021    HCT 26.1 11/25/2021    MCV 94.6 11/25/2021     11/25/2021     BMP:   Lab Results   Component Value Date     11/25/2021    K 4.0 11/25/2021    K 4.8 11/17/2021    CL 94 11/25/2021    CO2 23 11/25/2021    PHOS 4.3 11/23/2021    BUN 41 11/25/2021    CREATININE 3.2 11/25/2021    CALCIUM 8.7 11/25/2021     PT/INR:   Lab Results   Component Value Date    INR 1.02 11/18/2021     Lipids:   Lab Results   Component Value Date    ALKPHOS 115 11/23/2021    ALT 13 11/23/2021    AST 11 11/23/2021    BILITOT 0.3 11/23/2021    BILIDIR 0.3 03/06/2020    LABALBU 2.7 11/23/2021 Current Meds:  Scheduled Meds:   sodium chloride flush  5-40 mL IntraVENous 2 times per day    metoclopramide  5 mg IntraVENous Q8H    sodium chloride (Inhalant)  4 mL Nebulization Q6H    And    albuterol  2.5 mg Nebulization Q6H    lactobacillus  1 capsule Per NG tube Daily with breakfast    piperacillin-tazobactam  3,375 mg IntraVENous Q12H    chlorhexidine  15 mL Mouth/Throat BID    ketamine  100 mg IntraVENous Once    lidocaine 1 % injection  5 mL IntraDERmal Once    insulin lispro  0-6 Units SubCUTAneous Q6H    allopurinol  100 mg Oral Daily    phosphorus replacement protocol   Other RX Placeholder    potassium bicarb-citric acid  40 mEq Oral Once    tiotropium-olodaterol  2 puff Inhalation Daily    [Held by provider] aspirin  81 mg Oral Daily    pantoprazole  40 mg IntraVENous QAM    sodium hypochlorite   Irrigation Daily    Riociguat  2.5 mg Oral Q8H    FLUoxetine  40 mg Oral Daily    [Held by provider] metoprolol tartrate  12.5 mg Oral BID     Continuous Infusions:   sodium chloride      dextrose Stopped (11/25/21 0336)    [Held by provider] sodium chloride 50 mL/hr at 10/28/21 0217       Assessment:  1. Subacute combined hypercapnic hypoxic respiratory failure  2. Trach dependence  3. PEG dependence: s/p PEG placement on 11/24/21 with Dr. Ale De Leon  4. ALVIN on CKD stage III  5. Malnutition  6. Septic shock (resolved)  7. ANCA associated vasculitis  8. Hydronbephrosis  9. Cholelithiasis  10.  Hemoptysis: s/p bronchoscopy 11/22/21 with no active bleeding      Plan:    · Monitor H & H, transfuse prn  · TF per dietary recs  · Continue Reglan  · Nursing to monitor stool output & document  · ATBs per primary  · Resume anticoagulation on 11/26/2021  · GI sign off  · Supportive care per primary team    Case reviewed and impression/plan reviewed in collaboration with Dr. Ale De Leon  Electronically signed by Ruchi Slater DO on 11/25/2021 at 10:41 AM    [facility]

## 2021-11-25 NOTE — PROGRESS NOTES
Renal Progress Note    Assessment and Plan:   1. Acute kidney injury with serum creatinine worsening intradialysis. 2. Hypotension improved  3. Coccygeal wound infection  4. Hyponatremia likely from acute kidney injury and volume overload  5. Right hydronephrosis status post right nephrostomy tube placement  6. Recent CRRT  7. Positive RYLAN  8. Positive ANCA    PLAN:  1. Series of lab results reviewed  2. Medications reviewed  3. No changes  4. Urine eosinophils in view of antibiotic  5. Hemodialysis tomorrow using nontunneled dialysis catheter  6. Plan for tunneled dialysis catheter early next week if her blood cultures are negative  7. I believe she needs a kidney biopsy  8. Labs in the morning  9.  We will follow    Patient Active Problem List:     HTN (hypertension)     Chronic depression     CHF (congestive heart failure) (HCC)     Thrombocytopenia (HCC)     Moderate episode of recurrent major depressive disorder ([address]     Renal failure syndrome     Hyperkalemia     Isolated non-nephrotic proteinuria     ALVIN (acute kidney injury) ([address]     Chronic right-sided heart failure (HCC)     Pulmonary HTN (HCC)     Hypotension due to hypovolemia     Acute renal failure superimposed on stage 4 chronic kidney disease (HCC)     Pressure ulcer of right buttock, stage 3 (HCC)     Acute respiratory failure (HCC)     Flash pulmonary edema (HCC)     Shock (HCC)     Aspiration pneumonia of left lung (HCC)     Pleural effusion     Paroxysmal atrial fibrillation (HCC)     Hemoptysis     Chronic respiratory failure with hypoxia ([address]      Subjective:   Admit Date: 10/27/2021    Interval History:   Seen for acute kidney injury  Sleeping during round  No issues from staff  Blood pressure is better  Low urine output      Medications:   Scheduled Meds:   sodium chloride flush  5-40 mL IntraVENous 2 times per day    metoclopramide  5 mg IntraVENous Q8H    sodium chloride (Inhalant)  4 mL Nebulization Q6H    And    albuterol  2.5 mg Nebulization Q6H    lactobacillus  1 capsule Per NG tube Daily with breakfast    piperacillin-tazobactam  3,375 mg IntraVENous Q12H    chlorhexidine  15 mL Mouth/Throat BID    ketamine  100 mg IntraVENous Once    lidocaine 1 % injection  5 mL IntraDERmal Once    insulin lispro  0-6 Units SubCUTAneous Q6H    allopurinol  100 mg Oral Daily    phosphorus replacement protocol   Other RX Placeholder    potassium bicarb-citric acid  40 mEq Oral Once    tiotropium-olodaterol  2 puff Inhalation Daily    [Held by provider] aspirin  81 mg Oral Daily    pantoprazole  40 mg IntraVENous QAM    sodium hypochlorite   Irrigation Daily    Riociguat  2.5 mg Oral Q8H    FLUoxetine  40 mg Oral Daily    [Held by provider] metoprolol tartrate  12.5 mg Oral BID     Continuous Infusions:   sodium chloride      dextrose Stopped (11/25/21 0336)    [Held by provider] sodium chloride 50 mL/hr at 10/28/21 2314       CBC:   Recent Labs     11/23/21  0400 11/25/21  0555   WBC 4.8 7.9   HGB 8.7* 8.3*    203     CMP:    Recent Labs     11/23/21  0400 11/24/21  0640 11/25/21  0555   * 133* 129*   K 4.0 3.4* 4.0   CL 88* 96* 94*   CO2 21* 23 23   BUN 54* 34* 41*   CREATININE 3.5* 2.7* 3.2*   GLUCOSE 166* 82 101   CALCIUM 8.5 8.5 8.7   LABGLOM 13* 17* 14*     Troponin: No results for input(s): TROPONINI in the last 72 hours. BNP: No results for input(s): BNP in the last 72 hours. INR: No results for input(s): INR in the last 72 hours. Lipids: No results for input(s): CHOL, LDLDIRECT, TRIG, HDL, AMYLASE, LIPASE in the last 72 hours. Liver:   Recent Labs     11/23/21  0400   AST 11   ALT 13   ALKPHOS 115   PROT 5.2*   LABALBU 2.7*   BILITOT 0.3     Iron:  No results for input(s): IRONS, FERRITIN in the last 72 hours. Invalid input(s): LABIRONS  XR CHEST PORTABLE   Final Result   Dobbhoff tube projects over the stomach. **This report has been created using voice recognition software.  It may contain Final Result   Left nephrostomy tube with perinephric stranding and minimal mesenteric edema/ascites. No convincing evidence of an abscess collection. Possible osteomyelitis with the questionable cortical destruction along the dorsal aspect of the coccyx. **This report has been created using voice recognition software. It may contain minor errors which are inherent in voice recognition technology. **      Final report electronically signed by Dr. Baudilio Fink on 11/17/2021 10:52 AM      XR CHEST PORTABLE   Final Result   1. Stable left lung with improved aeration throughout the entirety of the    right lung. Remaining interstitial densities greatest involving the right    central lung and right lung base with residual small right basilar    effusion. 2. Support lines and tubes as above. This document has been electronically signed by: Zita Pritchett DO on    11/17/2021 04:31 AM      XR CHEST PORTABLE   Final Result   Persistent small bilateral pleural effusions with bibasilar opacities. **This report has been created using voice recognition software. It may contain minor errors which are inherent in voice recognition technology. **      Final report electronically signed by Dr. Powell Ganser on 11/16/2021 7:24 AM      XR CHEST PORTABLE   Final Result   The endotracheal tube has been retracted and now terminates approximately 3.5 cm above the level of the avelina. Bilateral pleural effusions and bilateral lower lobe consolidation as well as diffuse interstitial opacities are unchanged. **This report has been created using voice recognition software. It may contain minor errors which are inherent in voice recognition technology. **      Final report electronically signed by Dr Kate Argueta on 11/15/2021 12:45 PM      XR CHEST PORTABLE   Final Result   Bilateral pleural effusions and bilateral lower lobe airspace opacities as well as diffuse interstitial opacities are unchanged. Life-support and monitoring devices appear stable. **This report has been created using voice recognition software. It may contain minor errors which are inherent in voice recognition technology. **      Final report electronically signed by Dr Tiesha Whalen on 11/15/2021 12:39 PM      XR CHEST PORTABLE   Final Result   Impression:   Diffuse interstitial and airspace disease bilaterally. Aeration slightly    improved. Bilateral pleural effusions. This document has been electronically signed by: Madhu Garcia MD on    11/15/2021 04:09 AM      XR CHEST PORTABLE   Final Result   Impression:   Bilateral lung infiltrates, and effusion, similar to prior study. This document has been electronically signed by: Betsey Armstrong MD on    11/14/2021 04:49 AM      XR CHEST PORTABLE   Final Result   1. Mild improvement in the aeration of the left upper lobe. 2. Worsening of the airspace disease in the right lung. 3. Severe diffuse airspace disease is present. 4. Moderate left pleural effusion. Mild right pleural effusion. 5. Other findings as described above. **This report has been created using voice recognition software. It may contain minor errors which are inherent in voice recognition technology. **      Final report electronically signed by Dr Ottoniel Luna on 11/13/2021 10:14 AM      XR CHEST PORTABLE   Final Result   Complete opacification the left lung consistent with history of mucous plugging. No other changes from earlier            **This report has been created using voice recognition software. It may contain minor errors which are inherent in voice recognition technology. **      Final report electronically signed by Dr. Eugenia Arceo on 11/13/2021 1:20 AM      XR CHEST PORTABLE   Final Result   1. Possible interval decrease in left lung volume secondary to atelectasis    or lobar collapse, consolidation of the parenchyma, or enlargement pleural    effusion.  Further evaluation with cross-sectional imaging may be    considered to better characterize. There is also decreased lung volume on    the right with dense consolidation of the lower lung. These findings may    reflect acute respiratory distress syndrome, or multifocal pneumonia. This document has been electronically signed by: Deana Dill MD on    11/12/2021 07:25 AM      XR CHEST PORTABLE   Final Result   No pneumothorax. Improved aeration of the left upper lobe            **This report has been created using voice recognition software. It may contain minor errors which are inherent in voice recognition technology. **      Final report electronically signed by Dr. Sandy Truong on 11/11/2021 7:35 PM      XR CHEST PORTABLE   Final Result      1. Significantly worse appearance of the chest with complete opacification of the left hemithorax. This could be on the basis of a large pleural effusion. Left lung collapse could have a similar appearance. 2. There is a nasogastric tube coursing through the esophagus and terminating below the level of the diaphragm. 3. Infiltrates are seen throughout the right lung. Small right pleural effusion. **This report has been created using voice recognition software. It may contain minor errors which are inherent in voice recognition technology. **      Final report electronically signed by Dr Wu Yadav on 11/11/2021 4:26 PM      XR CHEST PORTABLE   Final Result   Impression:   Left basilar consolidation, increased since the prior study. This document has been electronically signed by: Leland De Souza MD on    11/11/2021 04:34 AM      CT ABDOMEN PELVIS WO CONTRAST Additional Contrast? None   Final Result   1. Very limited evaluation. Large bilateral pleural effusions and bilateral airspace infiltrates. #2 subcutaneous edema throughout the abdomen and pelvis indicating presence of third spacing. 3. Presence or absence of a perinephric abscess cannot be confirmed. **This report has been created using voice recognition software. It may contain minor errors which are inherent in voice recognition technology. **      Final report electronically signed by Dr. Pearl Shirley on 11/9/2021 11:31 PM      XR CHEST PORTABLE   Final Result   Impression:   Progressive left base consolidation. Improved aeration elsewhere. This document has been electronically signed by: Cayden Lucio MD on    11/09/2021 04:11 AM      XR CHEST PORTABLE   Final Result   Impression:   1. Extensive bilateral areas of airspace disease which have improved since    the previous exam.   2. Moderate left pleural effusion. This document has been electronically signed by: Emily Malcolm MD on    11/07/2021 10:06 AM      XR CHEST PORTABLE   Final Result   1. New left subclavian catheter with the tip in the right atrium   2. No change in endotracheal tube, enteric tube and right internal jugular line. 3. Cardiomegaly. 4. Extensive bilateral pulmonary opacification, approximately unchanged. .               **This report has been created using voice recognition software. It may contain minor errors which are inherent in voice recognition technology. **      Final report electronically signed by DR Francia Severin on 11/5/2021 3:37 PM      XR CHEST PORTABLE   Final Result   Impression:   1. Lines and tubes appropriate as above. 2. Increasing airspace consolidation and persistent cardiomegaly with    pleural effusion on the left. This document has been electronically signed by: Britney Cuellar MD on    11/05/2021 08:05 AM      CT ABDOMEN PELVIS WO CONTRAST Additional Contrast? None   Final Result       1. Interval percutaneous left nephrostomy tube placement with small amount of blood in the renal pelvis about the nephrostomy tube. 2. Hypodense lesion in the anterior aspect of the interpolar region of the left kidney with small focus of air at the margin. Developing abscess can't be excluded.    3. Scattered foci of air elsewhere in the kidney likely sequelae of nephrostomy tube placement. 4. Mildly worsening anasarca with persistent small to moderate ascites and moderate pleural effusions which may be cardiogenic given cardiomegaly. 5. Worsening opacities adjacent to the pleural effusions as evidence for worsening atelectasis or infiltrates. 6. Small hemopericardium, stable compared to prior exam.   7. Stable retroperitoneal periaortic lymphadenopathy at the level of the left kidney. **This report has been created using voice recognition software. It may contain minor errors which are inherent in voice recognition technology. **      Final report electronically signed by Dr. Karla Rock MD on 11/4/2021 1:24 PM      XR CHEST PORTABLE   Final Result   Worsening prominence of the pulmonary interstitium suggesting pulmonary edema versus infiltrates. Small bilateral pleural effusions. **This report has been created using voice recognition software. It may contain minor errors which are inherent in voice recognition technology. **      Final report electronically signed by Dr. Alf Quinteros on 11/3/2021 7:58 AM      CT CHEST WO CONTRAST   Final Result       1. Moderate bilateral pleural effusions have increased in size in the interval with associated adjacent atelectasis. 2. Persistent pulmonary vascular congestion/edema with cardiomegaly. 3. Decreased pericardial effusion. **This report has been created using voice recognition software. It may contain minor errors which are inherent in voice recognition technology. **      Final report electronically signed by Dr. Karla Rock MD on 11/1/2021 3:21 PM      IR GUIDED NEPHROSTOMY CATH PLACEMENT LEFT   Final Result   1. Large staghorn calculus in the left kidney. 2. Status post successful nephrostomy tube insertion. **This report has been created using voice recognition software.   It may contain minor errors which are inherent in voice recognition technology. **      Final report electronically signed by Dr. Edgar Saldivar on 11/1/2021 5:10 PM      CT ABDOMEN PELVIS WO CONTRAST   Final Result      1. Bilateral pleural effusions and abnormal densities in the right and left lower lobes consistent with inflammatory process. 2. Cardiomegaly. Small pericardial effusion. 3. Small amount of fluid surrounding the liver. 4. Gallstone. Increased attenuation in the gallbladder. No pericholecystic fluid or biliary dilatation. 5. Large staghorn calculus in the left kidney. Severe left-sided hydronephrosis and hydroureter. Increased density in the left perinephric fat consistent with inflammatory process. 6. Atherosclerotic calcification in the abdominal aorta, iliac and femoral arteries. 7. Padilla catheter within the bladder. 8. Lumbar spondylosis. 9. Increased density in the skin and subcutaneous soft tissues suggestive of edema or anasarca. 10. Enteric tube. [place]-Kasia catheter in place, seen better on plain radiographs         **This report has been created using voice recognition software. It may contain minor errors which are inherent in voice recognition technology. **      Final report electronically signed by DR Nemesio Connolly on 10/31/2021 11:19 AM      XR CHEST PORTABLE   Final Result   1. Small bilateral pleural effusions. 2. Bilateral pneumonia. 3. Stable cardiomegaly. **This report has been created using voice recognition software. It may contain minor errors which are inherent in voice recognition technology. **      Final report electronically signed by Dr. Jossy Modi on 10/31/2021 12:43 AM      US LIVER   Final Result   1. Cholelithiasis and pericholecystic fluid suspicious for acute cholecystitis. 2. Normal liver ultrasound. Final report electronically signed by Dr. Jossy Modi on 10/30/2021 11:26 PM      [id number]   Final Result   1. Cholelithiasis and pericholecystic fluid suspicious for acute cholecystitis. 2. Normal liver ultrasound. Final report electronically signed by Dr. Jeff Monzon on 10/30/2021 11:26 PM      CT CHEST W WO CONTRAST   Final Result   1. Moderate to marked right pleural effusion. Moderate left pleural effusion. 2. Complete collapse of the right lower lobe. Near-complete collapse of the left lower lobe. 3. Consolidative opacity seen in the left lower lobe may reflect infectious etiology. 4. Markedly enlarged thyroid. Left thyroid nodule. 5. Main pulmonary artery is dilated relating to pulmonary artery hypertension. 6. The common bile duct is dilated at 10 mm. A large gallstone is present. Staghorn calculus of the left kidney. 7. Other findings as described above. .            **This report has been created using voice recognition software. It may contain minor errors which are inherent in voice recognition technology. **      Final report electronically signed by Dr Angela Laura on 10/29/2021 11:35 PM      XR CHEST PORTABLE   Final Result   Diffuse infiltrates throughout the lungs bilaterally with small bilateral pleural effusions. Somewhat worse appearance of the chest when compared to the prior study. **This report has been created using voice recognition software. It may contain minor errors which are inherent in voice recognition technology. **      Final report electronically signed by Dr Georges Mckinley on 10/29/2021 9:43 AM      XR CHEST PORTABLE   Final Result   Impression:   1. Persistent left lower lobe atelectasis or consolidation. 2. Cardiomegaly with passive venous congestion. 3. Supportive devices in situ. This document has been electronically signed by: Genia Vitale MD on    10/28/2021 04:30 AM      XR CHEST PORTABLE   Final Result   1. The endotracheal tube terminates 9 mm above the avelina and should be withdrawn about 1-2 cm. Reimaging should be obtained.     2. Increased interstitial pulmonary markings are seen bilaterally. Patchy opacities seen in the right lung. 3. Mild cardiomegaly            **This report has been created using voice recognition software. It may contain minor errors which are inherent in voice recognition technology. **      Final report electronically signed by Dr Julio C Barnard on 10/27/2021 5:32 PM      XR CHEST PORTABLE   Final Result   Status post drainage of the left pleural effusion with improved aeration noted in the left hemithorax. No pneumothorax observed. Cardiomegaly is stable. Life support and monitoring devices are unchanged. **This report has been created using voice recognition software. It may contain minor errors which are inherent in voice recognition technology. **      Final report electronically signed by Dr Santiago Morin on 10/27/2021 4:18 PM      US THORACENTESIS Which side should the procedure be performed? Left   Final Result   Status post thoracentesis            **This report has been created using voice recognition software. It may contain minor errors which are inherent in voice recognition technology. **      Final report electronically signed by Dr. Yang Becerra on 10/27/2021 4:33 PM      XR CHEST PORTABLE   Final Result      The endotracheal tube has been adjusted with tip now terminating approximately 18 mm above the level of the avelina. Improved aeration is noted in the left upper lobe and right lower lobe. Persistent dense consolidation in the left mid and lower lobe are    observed. **This report has been created using voice recognition software. It may contain minor errors which are inherent in voice recognition technology. **      Final report electronically signed by Dr Santiago Morin on 10/27/2021 3:41 PM      XR CHEST PORTABLE   Final Result      The endotracheal tube tip terminates in the right mainstem bronchus.  Improved aeration is noted in the right lower lobe and left hemithorax. There continues to be dense consolidation in the left upper lobe and left lower lobe. Cardiomegaly is stable. COMMUNICATION: The results of this examination were discussed with Dr. Zuly Lea at 3:15 PM on 10/27/2021. **This report has been created using voice recognition software. It may contain minor errors which are inherent in voice recognition technology. **      Final report electronically signed by Dr Telma Lubin on 10/27/2021 3:16 PM      XR CHEST PORTABLE   Final Result      Near complete opacification of the left hemithorax with very little aerated lung visualized in the left upper lobe. Small right pleural effusion and right lower lobe consolidation obscures visualization of the right hemidiaphragm pulmonary vascular    congestion is observed. **This report has been created using voice recognition software. It may contain minor errors which are inherent in voice recognition technology. **      Final report electronically signed by Dr Telma Lubin on 10/27/2021 2:20 PM            Objective:   Vitals: /60   Pulse 97   Temp 98.8 °F (37.1 °C) (Axillary)   Resp 19   Ht 4' 9\" (1.448 m)   Wt 169 lb 12.1 oz (77 kg)   SpO2 98%   BMI 36.73 kg/m²    Wt Readings from Last 3 Encounters:   11/25/21 169 lb 12.1 oz (77 kg)   10/25/21 164 lb (74.4 kg)   09/29/21 160 lb (72.6 kg)      24HR INTAKE/OUTPUT:      Intake/Output Summary (Last 24 hours) at 11/25/2021 1311  Last data filed at 11/25/2021 6976  Gross per 24 hour   Intake 1135 ml   Output 405 ml   Net 730 ml       Constitutional: Well-developed elderly lady comfortably asleep on mechanical support via tracheostomy , no apparent distress   Skin:normal with no rash or lesions. HEENT: Head is normal.Throat is clear . Oral mucosa is moist.  Neck: Tracheostomy attached to mechanical support is noted  Cardiovascular:  S1, S2 without murmur or rubs   Respiratory:  Clear to ausculation without wheezes, rhonchi or rales in the anterior  Abdomen: Soft. Good bowel sounds. Right nephrostomy tube is noted. Minimal drainage in the nephrostomy tube tinted with blood. Ext: No LE edema  Musculoskeletal:Intact  Neuro: Deferred      Electronically signed by Padma Sanon MD on 11/25/2021 at 1:11 PM  **This report has been created using voice recognition software. It maycontain minor  errors which are inherent in voice recognition technology. **

## 2021-11-25 NOTE — PROGRESS NOTES
Patient Weaning Progress    The patient's vent settings was able to be weaned this shift. Ventilator settings that were weaned              [] Mode   [x] Pressure support weaned   [] Fio2 weaned   [] Peep weaned             Pt remains on Spontaneous mode. Pressure support weaned from 12 to 10 (per Dr Ed Godwin)   Pt tolerated well.

## 2021-11-26 ENCOUNTER — APPOINTMENT (OUTPATIENT)
Dept: GENERAL RADIOLOGY | Age: 71
DRG: 004 | End: 2021-11-26
Payer: MEDICARE

## 2021-11-26 ENCOUNTER — APPOINTMENT (OUTPATIENT)
Dept: INTERVENTIONAL RADIOLOGY/VASCULAR | Age: 71
DRG: 004 | End: 2021-11-26
Payer: MEDICARE

## 2021-11-26 ENCOUNTER — APPOINTMENT (OUTPATIENT)
Dept: CT IMAGING | Age: 71
DRG: 004 | End: 2021-11-26
Payer: MEDICARE

## 2021-11-26 LAB
ANION GAP SERPL CALCULATED.3IONS-SCNC: 14 MEQ/L (ref 8–16)
BUN BLDV-MCNC: 54 MG/DL (ref 7–22)
CALCIUM IONIZED: 1.19 MMOL/L (ref 1.12–1.32)
CALCIUM SERPL-MCNC: 8.4 MG/DL (ref 8.5–10.5)
CHLORIDE BLD-SCNC: 93 MEQ/L (ref 98–111)
CO2: 22 MEQ/L (ref 23–33)
CREAT SERPL-MCNC: 3.5 MG/DL (ref 0.4–1.2)
ERYTHROCYTE [DISTWIDTH] IN BLOOD BY AUTOMATED COUNT: 16.2 % (ref 11.5–14.5)
ERYTHROCYTE [DISTWIDTH] IN BLOOD BY AUTOMATED COUNT: 55.6 FL (ref 35–45)
GFR SERPL CREATININE-BSD FRML MDRD: 13 ML/MIN/1.73M2
GLUCOSE BLD-MCNC: 106 MG/DL (ref 70–108)
GLUCOSE BLD-MCNC: 107 MG/DL (ref 70–108)
GLUCOSE BLD-MCNC: 110 MG/DL (ref 70–108)
GLUCOSE BLD-MCNC: 93 MG/DL (ref 70–108)
HCT VFR BLD CALC: 26 % (ref 37–47)
HEMOGLOBIN: 8.4 GM/DL (ref 12–16)
MAGNESIUM: 1.8 MG/DL (ref 1.6–2.4)
MCH RBC QN AUTO: 30.1 PG (ref 26–33)
MCHC RBC AUTO-ENTMCNC: 32.3 GM/DL (ref 32.2–35.5)
MCV RBC AUTO: 93.2 FL (ref 81–99)
PLATELET # BLD: 204 THOU/MM3 (ref 130–400)
PMV BLD AUTO: 9.1 FL (ref 9.4–12.4)
POTASSIUM SERPL-SCNC: 4 MEQ/L (ref 3.5–5.2)
RBC # BLD: 2.79 MILL/MM3 (ref 4.2–5.4)
SODIUM BLD-SCNC: 129 MEQ/L (ref 135–145)
WBC # BLD: 7.1 THOU/MM3 (ref 4.8–10.8)

## 2021-11-26 PROCEDURE — 99233 SBSQ HOSP IP/OBS HIGH 50: CPT | Performed by: INTERNAL MEDICINE

## 2021-11-26 PROCEDURE — 36415 COLL VENOUS BLD VENIPUNCTURE: CPT

## 2021-11-26 PROCEDURE — 6360000002 HC RX W HCPCS: Performed by: PEDIATRICS

## 2021-11-26 PROCEDURE — 6360000002 HC RX W HCPCS: Performed by: STUDENT IN AN ORGANIZED HEALTH CARE EDUCATION/TRAINING PROGRAM

## 2021-11-26 PROCEDURE — 6370000000 HC RX 637 (ALT 250 FOR IP): Performed by: STUDENT IN AN ORGANIZED HEALTH CARE EDUCATION/TRAINING PROGRAM

## 2021-11-26 PROCEDURE — 94003 VENT MGMT INPAT SUBQ DAY: CPT

## 2021-11-26 PROCEDURE — 94640 AIRWAY INHALATION TREATMENT: CPT

## 2021-11-26 PROCEDURE — 2580000003 HC RX 258: Performed by: STUDENT IN AN ORGANIZED HEALTH CARE EDUCATION/TRAINING PROGRAM

## 2021-11-26 PROCEDURE — 71045 X-RAY EXAM CHEST 1 VIEW: CPT

## 2021-11-26 PROCEDURE — 85027 COMPLETE CBC AUTOMATED: CPT

## 2021-11-26 PROCEDURE — C9113 INJ PANTOPRAZOLE SODIUM, VIA: HCPCS | Performed by: NURSE PRACTITIONER

## 2021-11-26 PROCEDURE — 90935 HEMODIALYSIS ONE EVALUATION: CPT | Performed by: INTERNAL MEDICINE

## 2021-11-26 PROCEDURE — 82330 ASSAY OF CALCIUM: CPT

## 2021-11-26 PROCEDURE — 32557 INSERT CATH PLEURA W/ IMAGE: CPT | Performed by: RADIOLOGY

## 2021-11-26 PROCEDURE — 2580000003 HC RX 258: Performed by: INTERNAL MEDICINE

## 2021-11-26 PROCEDURE — 6360000002 HC RX W HCPCS: Performed by: NURSE PRACTITIONER

## 2021-11-26 PROCEDURE — 6370000000 HC RX 637 (ALT 250 FOR IP): Performed by: INTERNAL MEDICINE

## 2021-11-26 PROCEDURE — 80048 BASIC METABOLIC PNL TOTAL CA: CPT

## 2021-11-26 PROCEDURE — 6360000002 HC RX W HCPCS: Performed by: INTERNAL MEDICINE

## 2021-11-26 PROCEDURE — 6370000000 HC RX 637 (ALT 250 FOR IP): Performed by: FAMILY MEDICINE

## 2021-11-26 PROCEDURE — 83735 ASSAY OF MAGNESIUM: CPT

## 2021-11-26 PROCEDURE — C1769 GUIDE WIRE: HCPCS

## 2021-11-26 PROCEDURE — 2700000000 HC OXYGEN THERAPY PER DAY

## 2021-11-26 PROCEDURE — 90935 HEMODIALYSIS ONE EVALUATION: CPT

## 2021-11-26 PROCEDURE — 82948 REAGENT STRIP/BLOOD GLUCOSE: CPT

## 2021-11-26 PROCEDURE — 6360000002 HC RX W HCPCS: Performed by: PHARMACIST

## 2021-11-26 PROCEDURE — 2060000000 HC ICU INTERMEDIATE R&B

## 2021-11-26 PROCEDURE — 94761 N-INVAS EAR/PLS OXIMETRY MLT: CPT

## 2021-11-26 PROCEDURE — 71250 CT THORAX DX C-: CPT

## 2021-11-26 PROCEDURE — 6370000000 HC RX 637 (ALT 250 FOR IP): Performed by: NURSE PRACTITIONER

## 2021-11-26 PROCEDURE — 6360000002 HC RX W HCPCS: Performed by: FAMILY MEDICINE

## 2021-11-26 PROCEDURE — 0W9B30Z DRAINAGE OF LEFT PLEURAL CAVITY WITH DRAINAGE DEVICE, PERCUTANEOUS APPROACH: ICD-10-PCS | Performed by: RADIOLOGY

## 2021-11-26 PROCEDURE — 36592 COLLECT BLOOD FROM PICC: CPT

## 2021-11-26 RX ORDER — MORPHINE SULFATE 2 MG/ML
2 INJECTION, SOLUTION INTRAMUSCULAR; INTRAVENOUS ONCE
Status: COMPLETED | OUTPATIENT
Start: 2021-11-26 | End: 2021-11-26

## 2021-11-26 RX ADMIN — TIOTROPIUM BROMIDE AND OLODATEROL 2 PUFF: 3.124; 2.736 SPRAY, METERED RESPIRATORY (INHALATION) at 09:35

## 2021-11-26 RX ADMIN — PIPERACILLIN AND TAZOBACTAM 3375 MG: 3; .375 INJECTION, POWDER, LYOPHILIZED, FOR SOLUTION INTRAVENOUS at 22:06

## 2021-11-26 RX ADMIN — Medication 1 CAPSULE: at 10:48

## 2021-11-26 RX ADMIN — ALLOPURINOL 100 MG: 100 TABLET ORAL at 10:48

## 2021-11-26 RX ADMIN — METOCLOPRAMIDE HYDROCHLORIDE 5 MG: 5 INJECTION INTRAMUSCULAR; INTRAVENOUS at 01:13

## 2021-11-26 RX ADMIN — ALBUTEROL SULFATE 2.5 MG: 2.5 SOLUTION RESPIRATORY (INHALATION) at 18:38

## 2021-11-26 RX ADMIN — ALBUTEROL SULFATE 2.5 MG: 2.5 SOLUTION RESPIRATORY (INHALATION) at 00:40

## 2021-11-26 RX ADMIN — SODIUM CHLORIDE SOLN NEBU 3% 4 ML: 3 NEBU SOLN at 18:38

## 2021-11-26 RX ADMIN — ALBUTEROL SULFATE 2.5 MG: 2.5 SOLUTION RESPIRATORY (INHALATION) at 12:48

## 2021-11-26 RX ADMIN — SODIUM HYPOCHLORITE: 1.25 SOLUTION TOPICAL at 07:30

## 2021-11-26 RX ADMIN — PIPERACILLIN AND TAZOBACTAM 3375 MG: 3; .375 INJECTION, POWDER, LYOPHILIZED, FOR SOLUTION INTRAVENOUS at 10:34

## 2021-11-26 RX ADMIN — PANTOPRAZOLE SODIUM 40 MG: 40 INJECTION, POWDER, FOR SOLUTION INTRAVENOUS at 10:42

## 2021-11-26 RX ADMIN — CHLORHEXIDINE GLUCONATE 15 ML: 1.2 RINSE ORAL at 09:00

## 2021-11-26 RX ADMIN — FLUOXETINE 40 MG: 20 CAPSULE ORAL at 10:48

## 2021-11-26 RX ADMIN — SODIUM CHLORIDE, PRESERVATIVE FREE 10 ML: 5 INJECTION INTRAVENOUS at 21:31

## 2021-11-26 RX ADMIN — MORPHINE SULFATE 2 MG: 2 INJECTION, SOLUTION INTRAMUSCULAR; INTRAVENOUS at 02:07

## 2021-11-26 RX ADMIN — LORAZEPAM 1 MG: 2 INJECTION INTRAMUSCULAR; INTRAVENOUS at 04:07

## 2021-11-26 RX ADMIN — METOCLOPRAMIDE HYDROCHLORIDE 5 MG: 5 INJECTION INTRAMUSCULAR; INTRAVENOUS at 10:41

## 2021-11-26 RX ADMIN — SODIUM CHLORIDE, PRESERVATIVE FREE 10 ML: 5 INJECTION INTRAVENOUS at 16:55

## 2021-11-26 RX ADMIN — SODIUM CHLORIDE, PRESERVATIVE FREE 10 ML: 5 INJECTION INTRAVENOUS at 10:30

## 2021-11-26 RX ADMIN — SODIUM CHLORIDE SOLN NEBU 3% 4 ML: 3 NEBU SOLN at 00:40

## 2021-11-26 RX ADMIN — CHLORHEXIDINE GLUCONATE 15 ML: 1.2 RINSE ORAL at 21:31

## 2021-11-26 RX ADMIN — SODIUM CHLORIDE, PRESERVATIVE FREE 10 ML: 5 INJECTION INTRAVENOUS at 10:49

## 2021-11-26 RX ADMIN — METOCLOPRAMIDE HYDROCHLORIDE 5 MG: 5 INJECTION INTRAMUSCULAR; INTRAVENOUS at 16:54

## 2021-11-26 ASSESSMENT — PULMONARY FUNCTION TESTS
PIF_VALUE: 16
PIF_VALUE: 16
PIF_VALUE: 18
PIF_VALUE: 19
PIF_VALUE: 16
PIF_VALUE: 16

## 2021-11-26 ASSESSMENT — PAIN SCALES - GENERAL
PAINLEVEL_OUTOF10: 0
PAINLEVEL_OUTOF10: 0

## 2021-11-26 NOTE — PROGRESS NOTES
HOSPITALIST PROGRESS NOTE    Patient - Gypsy Rodriguez   MRN -  [id number]   Thomas # - [de-identified]   - 1950      Date of Admission -  10/27/2021  1:36 PM  Date of evaluation -  2021  Room - [address] [place] [address]-A   Hospital Day - 30  Primary Care Physician - Renee Berger MD   Code Status: FULL CODE    Chief Complaint:    Shortness of breath     History Of Presenting Illness:     71F admitted to [facility] 10/27/21 w/ SOB. Current smoker w/ PMH PAH grp 3, HFpEF, stage III sacral ulcer s/p wound ostomy . Patient sister reports SpO2 51% at home and was brought to ED where ABG showed pH 7.19, PCO2 80, PO2 134. She required emergent intubation and was transferred to ICU. Workup revealed left sided pleural effusion and underwent US guided thoracentesis w/ 1100 cc removed consistent w/ MRSA/adenovirus. Patient was noted to be in afib/RVR and was placed on amio, heparin drip. Patient was also noted to have normocytic anemia and received 1 unit PRBC 10/31/21. CT abdomen revealed CBD dilation w/ cholelithiasis.      2021: Plan for nephrostomy tubes today. Hemoglobin 7.7 s/p 1 unit PRBC yesterday. Weaned to 4 mics Levophed. 2021: Patient resumed back on prior dose of vancomycin. Low urine output w/ increasing pressor requirements today  2021: Will attempt SBT if able to wean today. Increased Amio drip 2/2 worsening tachycardia. Continued hematuria w/ low urine output. Cr stable. Will evaluate UTI following perc tube placement. Rise in WBC noted. Culture pending  2021: Cardioversion on 11/3/2021. Now and NSR. CVP 29 this a.m. Suspect drop in CI 2/2 to stopping levophed. Diffuse edema following transfusion overnight. Given one-time dose of 2 mg Bumex and albumin. SBT this AM  2021: Failed SBT yesterday. Continued low urine output. Trial of Bumex today. Will proceed with dialysis if fails to improve.   2021: Started on CRRT, continues to have bloody drainage of nephrostomy tube given 1 unit PRBC, cefipime emperically pending cultures of nephrostomy tube fluid and repeat resp cultures. 11/07/21: Patient remains clinically well on exam. We'll continue medical of UF with CRRT. On empiric cefepime for coverage of perinephric abscess noted on CT abdomen and pelvis November 4, 2021. White count trending down. Will attempt spontaneous breathing trial in a.m.  11/08/21: Transfused 1x PRBC this AM. Anemia workup pending. WBC trending down. Patient ventilator settings this AM preclude SBT. Volume overloaded on exam. UF increased to 125 cc/hr this AM. Will attempt to wean vent settings further further. Coag neg staph growing on nephrostomy tube aspirate. Suspect contamination. Continue existing ABx  11/09/21: Pulse dose steroids initiated overnight. Started on SSI. Urine output now improving 75cc/hr. Transfused 3 units PRBCs yesterday for Improve DO2. MERCEDES noted w/ 875 mg deficit. Will replace once steroids/ABx completed. 11/10/21: Extubated overnight. SLP eval today. Continue BiPAP while asleep w/ transition to NC while awake as tolerated. No tachypnea. Day 3 pulse dose steroids. Continued bloody drainage from nephrostomy tube. Holding [facility]   11/11/21: Worsening bilateral infiltrates with SpO2 <90% on HFNC. Continued pulmonary hygiene w/ nebulized hypertonic saline, acapella and breathing treatments in addition to deep suctioning. Continued on Vancomycin for MRSA PNA. Plan for family meeting this AM to discuss goals of care. 11/12/21: Follow-up discussion from family meeting. Patient nodded agreement with full code status including reintubation leading to tracheostomy and PEG placement and continued hemodialysis if needed. Patient nodded agreement and understanding with these decisions. CXR yesterday evening did show left mainstem mucus plugging with cutoff sign and absent breath sounds and did undergo emergent bronchoscopy overnight on 11/12/21.  Diminished breath sounds and worsening respiratory status this AM concerning for continued plugging. Stat chest X-ray ordered for confirmation. 11/13/21: Respiratory status continues to decline, she is requiring BiPAP with FiO2 of 70%. Chest x-ray shows complete opacification of the left lung secondary to mucous plugging, also the patient has left-sided pleural effusion. Plan for bronchoscopy. 11/15/2021: Family meeting today per event note. Reintubated today after failed BiPAP  11/16/2021: will dc abx tomorrow. PNA panel negative. Plan for tracheostomy time permitting today. Weaning phenylephrine as tolerated. 11/17/2021: Necrotic Stage IV pressure ulcer probing to bone noted this AM. CT abdomen and pelvis pending to evaluate for abscess. Will discuss with family today when available. 11/18/2021: CT showed bony involvement of sacral ulcer w/ concern for osteomyelitis. Wound ostomy following. Culture pending. Started on broad spectrum therapy with Vancomycin and Zosyn for coverage of suspect osteomyelitis. Weaning sedation as tolerated  11/19/21: Wound culture pending. On broad spectrum ABx for coverage of osteomyelitis. Will de-escalate pending culture/sensitiivity. Consult to GI for PEG tube evaluation. 11/21/21: Tolerated conventional HD on 11/20/21 w/ 2500cc removed. Wound culture growing gram negative bacilli. Vanc discontinued. Weaned off phenylephrine. Will monitor overnight. Okay to transfer to step down in AM  11/22/21: FiO2 30%, rate of 8 L/min. Remains on tube feeds on day 5 Zosyn. Bronchoscopy for hemoptysis unremarkable findings of old bloody residual likely from trach procedure. 11/23/21: Remains on tube feeds on day 6 Zosyn. Creatinine bumped up to 3.5 today with Na low at 124. Nephrology performed HD today with tunneled cath next. GI planning for PEG tube placement tomorrow. 11/24/21: 7-day Zosyn completed.  PEG tube placed by GI.  11/25/21: See below  Subjective (Last 24 Hours):    Patient laying in bed comfortably this morning with no complaints or concerns on minimal trach vent settings. Labs looking a little worse, will be due for another hemodialysis session tomorrow. Otherwise well. Plan for DC to [facility] tomorrow. All other ROS negative. Medications    Current Medications    sodium chloride flush  5-40 mL IntraVENous 2 times per day    metoclopramide  5 mg IntraVENous Q8H    sodium chloride (Inhalant)  4 mL Nebulization Q6H    And    albuterol  2.5 mg Nebulization Q6H    lactobacillus  1 capsule Per NG tube Daily with breakfast    piperacillin-tazobactam  3,375 mg IntraVENous Q12H    chlorhexidine  15 mL Mouth/Throat BID    ketamine  100 mg IntraVENous Once    lidocaine 1 % injection  5 mL IntraDERmal Once    insulin lispro  0-6 Units SubCUTAneous Q6H    allopurinol  100 mg Oral Daily    phosphorus replacement protocol   Other RX Placeholder    potassium bicarb-citric acid  40 mEq Oral Once    tiotropium-olodaterol  2 puff Inhalation Daily    [Held by provider] aspirin  81 mg Oral Daily    pantoprazole  40 mg IntraVENous QAM    sodium hypochlorite   Irrigation Daily    Riociguat  2.5 mg Oral Q8H    FLUoxetine  40 mg Oral Daily    [Held by provider] metoprolol tartrate  12.5 mg Oral BID     sodium chloride flush, sodium chloride, LORazepam, lidocaine PF, ondansetron, albuterol, fentanNYL, glucose, dextrose, glucagon (rDNA), dextrose, polyethylene glycol, senna, acetaminophen  IV Drips/Infusions   sodium chloride      dextrose Stopped (11/25/21 0336)    [Held by provider] sodium chloride 50 mL/hr at 10/28/21 7904     Diet    Diet NPO  Allergies    Patient has no known allergies. Vitals     height is 4' 9\" (1.448 m) and weight is 169 lb 12.1 oz (77 kg). Her oral temperature is 98.2 °F (36.8 °C). Her blood pressure is 130/59 (abnormal) and her pulse is 100. Her respiration is 18 and oxygen saturation is 96%. Body mass index is 36.73 kg/m².     SUPPLEMENTAL O2: O2 Flow Rate (L/min): 16 L/min   Input/Output Intake/Output Summary (Last 24 hours) at 11/26/2021 0020  Last data filed at 11/25/2021 2319  Gross per 24 hour   Intake 1243.48 ml   Output 605 ml   Net 638.48 ml     I/O last 3 completed shifts: In: 1093.5 [I.V.:235.5; NG/GT:858]  Out: 605 [Urine:205; Stool:400]   Patient Vitals for the past 96 hrs (Last 3 readings):   Weight   11/25/21 0314 169 lb 12.1 oz (77 kg)   11/24/21 0356 169 lb 12.1 oz (77 kg)   11/23/21 1815 165 lb 12.6 oz (75.2 kg)     Physical Exam   General: Acute on chronically ill-appearing elderly white female on trach ventilation   HEENT: Temporal wasting appreciated. Normocephalic and atraumatic. No scleral icterus. PERR  Neck: supple. No Thyromegaly. Left subclavian dialysis catheter   Lungs: Mild diffuse rhonchi appreciated bilaterally No retractions  Cardiac: RRR. No JVD. Abdomen: soft. Nontender,nephrostomy tube with bloody drainage  Extremities: +1 pitting edema x4. (interval improvement noted) No clubbing, cyanosis   Vasculature: capillary refill < 3 seconds. Palpable dorsalis pedis pulses. Skin:  warm and dry. Psych: Alert and oriented to person place and time, affect appropriate  Lymph:  No supraclavicular adenopathy. Neurologic:  No focal deficit. No seizures.   Labs   ABG  Lab Results   Component Value Date    PH 7.36 11/13/2021    PO2 59 11/13/2021    PCO2 45 11/13/2021    HCO3 26 11/13/2021    O2SAT 89 11/13/2021     Lab Results   Component Value Date    IFIO2 70 11/13/2021    MODE PC 11/05/2021    SETPEEP 8.0 11/15/2021     CBC  Recent Labs     11/23/21  0400 11/25/21  0555   WBC 4.8 7.9   RBC 2.97* 2.76*   HGB 8.7* 8.3*   HCT 27.6* 26.1*   MCV 92.9 94.6   MCH 29.3 30.1   MCHC 31.5* 31.8*    203   MPV 9.8 9.1*      BMP  Recent Labs     11/23/21  0400 11/24/21  0640 11/25/21  0555   * 133* 129*   K 4.0 3.4* 4.0   CL 88* 96* 94*   CO2 21* 23 23   BUN 54* 34* 41*   CREATININE 3.5* 2.7* 3.2*   GLUCOSE 166* 82 101   MG 1.8  --   --    PHOS 4.3  --   --    CALCIUM 8.5 8.5 8.7     LFT  Recent Labs     11/23/21  0400   AST 11   ALT 13   BILITOT 0.3   ALKPHOS 115     TROP  Lab Results   Component Value Date    TROPONINT 0.084 10/28/2021    TROPONINT 0.059 10/27/2021    TROPONINT 0.037 03/06/2020     BNP  No results for input(s): BNP in the last 72 hours. Lactic Acid  No results for input(s): LACTA in the last 72 hours. INR  No results for input(s): INR, PROTIME in the last 72 hours. PTT  No results for input(s): APTT in the last 72 hours. Glucose  Recent Labs     11/25/21  1136 11/25/21  1729 11/25/21  2314   POCGLU 107 105 110*     Microbiology     Culture with Smear, Acid Fast Bacillius [[id number]] Collected: 10/27/21 1550   Order Status: Completed Specimen: Body Fluid Updated: 11/23/21 1331    AFB Culture & Smear SEE BELOW   Culture, Anaerobic and Aerobic [[id number]] (Abnormal)  Collected: 11/19/21 1048   Order Status: Completed Specimen: Coccyx Updated: 11/23/21 0601    Aerobic Culture Culture also yielded very light growth of Staphylococcus species (coagulase negative), and additional enteric gram negative bacilli. At least one of drugs in current antibiotic therapy is ineffective in vitro for isolate.  Abnormal     Anaerobic Culture Culture overgrown by swarming Proteus species. No further evaluation of this culture is possible. If a true mixed aerobic and anaerobic infection is suspected, then broad spectrum empiric antibiotic therapy is indicated and should include coverage for anaerobic organisms. Gram Stain Result No segmented neutrophils observed. No epithelial cells observed. No organisms observed.      Organism Proteus mirabilis Abnormal     Aerobic Culture moderate growth     Organism Pseudomonas aeruginosa Abnormal     Aerobic Culture light growth      Procedures   - Hemodialysis on 11/23/21    Problem List      Active Hospital Problems    Diagnosis Date Noted    Paroxysmal atrial fibrillation ([facility] [I48.0] 11/22/2021    Hemoptysis [R04.2]     Chronic respiratory failure with hypoxia (HCC) [J96.11]     Aspiration pneumonia of left lung ([address] [J69.0]     Pleural effusion [J90]     Shock ([address] [R57.9]     Flash pulmonary edema ([address] [J81.0]     Acute respiratory failure ([address] [J96.00] 10/27/2021    Pressure ulcer of right buttock, stage 3 ([address] [L89.313] 07/29/2021    Pulmonary HTN ([address] [I27.20] 09/03/2019      Assessment & Plan:   1. Subacute combined hypercapnic and hypoxic respiratory failure: D/t to pneumonia with L-sided pleural effusion and repeated mucous plugging. Intubated 10/27/21. Extubated following successful SBT on 11/9/21. Failed HFNC and was reintubated on 11/15/21. Tracheostomy placed 11/17/21.    - Pulmonology following    - Wean as tolerated   - Lung protective strategies    - Aspiration precautions    2. Stage III ALVIN on CKD 3: Suspect post renal etiology 2/2 staghorn calculi. Nephrology following. CRRT 11/5/21 until 11/12/21. Anti-DS-DNA negative, Anti histone negative, Anti Smith,and  Anti-Savana WNL, GBM antibodies negative, C3/C4 levels normal, elevated kappa/lambda free light chains with normal ratio. HD started 11/20/21. Positive for ANCA associated vasculitis. ANCA associated Abs show elevated anti MPO elevated 416 and serine proteinase 3 IgG WNL   - Plans for biopsy per nephrology when stable   - CVVH stopped 11/15/21 significant oliguria since that time   - Hemodialysis 11/23/21, will repeat on 11/26  3. Dysphagia: 2/2 prolonged intubation. - GI following, PEG tube placed on 11/24/21   - Protonix daily    - Ok to resume VTE prophylaxis   4. Hemoptysis: (resolved): Bronchoscopy on 11/22 remarkable for old blood stained mucus in RLL. No active bleed. - Pulm following, appreciate recs     - Jose Alfredo 300 mg by nebulization twice daily  5. Acute blood loss anemia with chronic normocytic anemia: 2/2 hematuria s/p perc tube placement w/ Iron studies consistent w/ MERCEDES vs Anemia chronic disease.  B12 WNL, Folate low, Abs reticulocyte index 11/18/21 0.49% Soluable transferrin 139. Calculated Iron deficit 827mg. PRBC x1 transfused 10/31/21, 11/4/21, 11/5/21, x2 11/6/21, x3 11/8/21, x3 11/15/21.    - Heparin stopped (dialysis dose still running)   - Continue holding ASA    - Venofer, replace folate once ABx stopped  6. 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 Probes to bone, necrosis noted around wound site. CT abdomen and pelvis showing possible mi involvement with concern for osteomyelitis. Seen by general surgery on 11/17/21. No indications for debridement at the time. Poor candidate for surgery given multiple co-morbidities, malnutrition and poor wound healing. Wound cultures from 11/19 growing proteus mirabilis and pseudomonas aeruginosa. Completed 7-day course of Zosyn on 11/24 for proteus mirabilis & pseudomonas aeruginosa . - Wound care following, continue Dakins w/ dressing changes  7. Hypokalemia: 3.4 on 11/24. Potassium replacement protocol in place. 8. Hyponatremia: 124 on 11/23 in setting of ALVIN stage III on CKD. Will get HD on 11/23/21. Monitor with daily BMP   9. Hematuria: s/p percutaneous tube placement. CBI per above. Stopped heparin, holding ASA. 10. ANCA associated Vasculitis: workup per above. Will need Renal Bx for confirmation once stable. Discussed case w/ Nephrology. Completed pulse dose steroids starting on 11/8/21 w/ 10mg/kg methylprednisolone for 3 days. SSI started for coverage. Not candidate for Rituxin/TPE 2/2 existing infection. 11. Bilateral Pleural Effusion:   Associated volume overload in context of renal failure. Interval enlargement noted on CT 11/1/21. Currently on CVVH for effusions. Plan for chest tube insertion if no improvement  12. Hydronephrosis: S/p nephrostomy tube placement still some bloodstained discharge  13. Suspected COPD: current every day smoker.

## 2021-11-26 NOTE — PROGRESS NOTES
[facility]      Patient - Magalis Gutierrez   MRN -  [id number]   [facility] # - [de-identified]   - 1950      Date of Admission -  10/27/2021  1:36 PM  Date of evaluation -  2021  Room - -012-A   Hospital Day - [address],  Primary Care Physician - Jennie Joiner MD     Problem List      Active Hospital Problems    Diagnosis Date Noted    Paroxysmal atrial fibrillation ([address] [I48.0] 2021    Hemoptysis [R04.2]     Chronic respiratory failure with hypoxia (HCC) [J96.11]     Aspiration pneumonia of left lung ([address] [J69.0]     Pleural effusion [J90]     Shock ([address] [R57.9]     Flash pulmonary edema ([address] [J81.0]     Acute respiratory failure ([address] [J96.00] 10/27/2021    Pressure ulcer of right buttock, stage 3 ([address] [L89.313] 2021    Pulmonary HTN ([address] [I27.20] 2019     Reason for Consult    Vent management, Hemoptysis  HPI   History Obtained From: Patient's niece at bedside and electronic medical record. Magalis Gutierrez is a 73 y.o. female never smoker with PMHx chronic diastolic CHF (EF 07-09%, H6ZB per TTE 21) / NYHA II / chronic RV failure / severe PAH (PA 85/31 - mean 52 / PVR 10), mild AS (valve area 1.7 sq cm), morbid obesity, HTN and depression -- presents to [address] [place] [address] with a chief complaint of shortness of breath. History obtained from the EMR (patient intubated / sedated on the ventilator).     Patient presented to [address] [place] [address] ED after her sister noticed the patient was experiencing worsening shortness of breath throughout the day / recorded SpO2 51% on home pulse oximeter prior to arrival. The patient has a known history of CHF and PAH, for which she follows with Dr. Mata Main (cardiology) and TYL[facility] Gross [address]4 / currently on adempas. Per report, she was recently discontinued from oral bumex, which she stopped taking just this morning.  She endorsed mild headache on arrival (poorly characterized), but no associated fevers/chills, chest pain, palpitations, cough, n/v/d, abdominal pain, urinary symptoms, weakness or syncope were reported. Limited additional history available. Per patient's niece the patient was sedentary at home before admission to the hospital.     Patient was intubated on 10/27/21. Extubated following successful SBT on 11/9/21. Status post bronchoscopy with mucous plug removal from the left mainstem on 11/13. Failed HFNC and was reintubated on 11/15/21. Tracheostomy tube was placed on 11/17/21. She is having shortness of breath: No  Functional status prior to beginning of symptoms: 0 block/s on level ground. Current functional capacity on level ground: 0 block/s on level ground. She can climb steps: No  Flights of steps she can climb: 0    She is having cough: Yes  Duration of cough: for 30+ days. Her cough is associated with sputum production: Yes   The sputum color: white  Hemoptysis:Yes    She is having chest pain:No      Past 24 hrs   -Was on PSV/CPAP 12/6 all night, very well tolerated  -FiO2 30%  -Still very weak. -No fever or chills.     All other systems reviewed    PMHx   Past Medical History      Diagnosis Date    CHF (congestive heart failure) ([facility])     Dr. Abhijit Culver Depression     Gout attack     Hypertension     Osteoarthritis     Pulmonary artery hypertension ([facility] [address] [facility]     [facility]-- Dr. Juany Wright-- Dr. Abhijit Culver Renal calculi     Dr. Lazarus Credit [facility])       Past Surgical History        Procedure Laterality Date    APPENDECTOMY  1960    BRONCHOSCOPY N/A 11/22/2021    BRONCHOSCOPY performed by Jose Hawkins MD at [address] [facility]    IR NEPHROSTOMY PERCUTANEOUS LEFT  11/1/2021    IR NEPHROSTOMY PERCUTANEOUS LEFT 11/1/2021 Nelson Joaquin MD [facility] SPECIAL PROCEDURES    PRESSURE ULCER DEBRIDEMENT Right 8/6/2021    EXCISION RIGHT BUTTOCK WOUND AND CLOSURE performed by Tatiana Lopes MD at [address]    Current Medications    sodium chloride flush  5-40 mL IntraVENous 2 times per day    metoclopramide  5 mg IntraVENous Q8H    sodium chloride (Inhalant)  4 mL Nebulization Q6H    And    albuterol  2.5 mg Nebulization Q6H    lactobacillus  1 capsule Per NG tube Daily with breakfast    piperacillin-tazobactam  3,375 mg IntraVENous Q12H    chlorhexidine  15 mL Mouth/Throat BID    ketamine  100 mg IntraVENous Once    lidocaine 1 % injection  5 mL IntraDERmal Once    insulin lispro  0-6 Units SubCUTAneous Q6H    allopurinol  100 mg Oral Daily    phosphorus replacement protocol   Other RX Placeholder    potassium bicarb-citric acid  40 mEq Oral Once    tiotropium-olodaterol  2 puff Inhalation Daily    [Held by provider] aspirin  81 mg Oral Daily    pantoprazole  40 mg IntraVENous QAM    sodium hypochlorite   Irrigation Daily    Riociguat  2.5 mg Oral Q8H    FLUoxetine  40 mg Oral Daily    [Held by provider] metoprolol tartrate  12.5 mg Oral BID     sodium chloride flush, sodium chloride, LORazepam, lidocaine PF, ondansetron, albuterol, fentanNYL, glucose, dextrose, glucagon (rDNA), dextrose, polyethylene glycol, senna, acetaminophen  IV Drips/Infusions   sodium chloride      dextrose Stopped (11/25/21 0336)    [Held by provider] sodium chloride 50 mL/hr at 10/28/21 1882     Home Medications  Medications Prior to Admission: metoprolol tartrate (LOPRESSOR) 25 MG tablet, Take 0.5 tablets by mouth 2 times daily  sodium hypochlorite (DAKINS) 0.125 % SOLN external solution, Apply Dakin's moistened gauze dressings to wound twice daily and as needed.   sodium chloride 1 g tablet, Take 1 tablet by mouth 2 times daily  allopurinol (ZYLOPRIM) 300 MG tablet, Take 1 tablet by mouth daily  FLUoxetine (PROZAC) 40 MG capsule, Take 1 capsule by mouth daily  potassium chloride (KLOR-CON M) 10 MEQ extended release tablet, Take 1 tablet by mouth every other day  bumetanide (BUMEX) 1 MG tablet, Take 1 tablet by mouth daily  Multiple Vitamins-Minerals (MULTIVITAMIN WOMEN PO), Take 1 tablet by mouth daily  acetaminophen (TYLENOL) 650 MG extended release tablet, Take 650 mg by mouth every 8 hours as needed for Pain  Riociguat (ADEMPAS) 2.5 MG TABS, Take 2.5 mg by mouth 3 times daily  docusate sodium (COLACE) 100 MG capsule, Take 100 mg by mouth as needed   aspirin 81 MG tablet, Take 81 mg by mouth daily   Diet    ADULT TUBE FEEDING; PEG; Renal Formula; Continuous; 30; No; 240; Q 8 hours  Allergies    Patient has no known allergies. Social History     Social History     Socioeconomic History    Marital status: Single     Spouse name: Not on file    Number of children: 0    Years of education: Not on file    Highest education level: Not on file   Occupational History    Not on file   Tobacco Use    Smoking status: Never Smoker    Smokeless tobacco: Never Used   Vaping Use    Vaping Use: Never used   Substance and Sexual Activity    Alcohol use: No    Drug use: No    Sexual activity: Not Currently   Other Topics Concern    Not on file   Social History Narrative    Not on file     Social Determinants of Health     Financial Resource Strain: Low Risk     Difficulty of Paying Living Expenses: Not very hard   Food Insecurity: No Food Insecurity    Worried About Running Out of Food in the Last Year: Never true    Traci of Food in the Last Year: Never true   Transportation Needs:     Lack of Transportation (Medical): Not on file    Lack of Transportation (Non-Medical):  Not on file   Physical Activity:     Days of Exercise per Week: Not on file    Minutes of Exercise per Session: Not on file   Stress:     Feeling of Stress : Not on file   Social Connections:     Frequency of Communication with Friends and Family: Not on file    Frequency of Social Gatherings with Friends and Family: Not on file    Attends Judaism Services: Not on file    Active Member of Clubs or Organizations: Not on file    Attends Club or Organization Meetings: Not on file  Marital Status: Not on file   Intimate Partner Violence:     Fear of Current or Ex-Partner: Not on file    Emotionally Abused: Not on file    Physically Abused: Not on file    Sexually Abused: Not on file   Housing Stability:     Unable to Pay for Housing in the Last Year: Not on file    Number of Places Lived in the Last Year: Not on file    Unstable Housing in the Last Year: Not on file     Family History          Problem Relation Age of Onset    Diabetes Father    Mercedes Arthritis Mother     COPD Mother     Diabetes Sister     Heart Disease Maternal Uncle     Breast Cancer Niece 36    Sleep Apnea Brother     Asthma Neg Hx     Birth Defects Neg Hx     Cancer Neg Hx     Depression Neg Hx     Early Death Neg Hx     Hearing Loss Neg Hx     High Blood Pressure Neg Hx     High Cholesterol Neg Hx     Kidney Disease Neg Hx     Learning Disabilities Neg Hx     Mental Illness Neg Hx     Mental Retardation Neg Hx     Miscarriages / Stillbirths Neg Hx     Stroke Neg Hx     Substance Abuse Neg Hx     Vision Loss Neg Hx     Other Neg Hx      Sleep History    Never diagnosed with sleep apnea in the past.  Occupational history   Occupation:  She is current working: No  Type of profession: unemployed, disabled. History of tobacco smoking:No     History of recreational or IV drug use in the past:NO     History of exposure to coal mines/coal dust: NO  History of exposure to foundry dust/welding: NO  History of exposure to quarry/silica/sandblasting: NO  History of exposure to asbestos/working with breaks/ships: NO  History of exposure to farm dust: NO  History of recent travel to long distances: NO  History of exposure to birds, pigeons, or chickens in the past:NO    History of pulmonary embolism in the past: No            History of DVT in the past:No              Vitals     height is 4' 9\" (1.448 m) and weight is 166 lb 7.2 oz (75.5 kg). Her temperature is 97.6 °F (36.4 °C).  Her blood pressure is 119/63 and her pulse is 108. Her respiration is 19 and oxygen saturation is 96%. Body mass index is 36.02 kg/m². SUPPLEMENTAL O2: O2 Flow Rate (L/min): 16 L/min     I/O        Intake/Output Summary (Last 24 hours) at 11/26/2021 1326  Last data filed at 11/26/2021 1148  Gross per 24 hour   Intake 1647.48 ml   Output 2545 ml   Net -897.52 ml     I/O last 3 completed shifts: In: 1217.5 [I.V.:95.5; NG/GT:1122]  Out: 366 [Urine:245; Stool:400]   Patient Vitals for the past 96 hrs (Last 3 readings):   Weight   11/26/21 1148 166 lb 7.2 oz (75.5 kg)   11/26/21 0750 169 lb 12.1 oz (77 kg)   11/25/21 0314 169 lb 12.1 oz (77 kg)       Exam   Physical Exam   Constitutional: No distress on vent via trach. Patient appears moderately built. Head: Normocephalic and atraumatic. Mouth/Throat: Oropharynx is clear and moist.  No oral thrush. Eyes: Conjunctivae are normal. Pupils are equal, round. No scleral icterus. Neck: Neck supple. 7.5 mm Bivona portex in place  Cardiovascular: S1 and S2 with no murmur. No peripheral edema  Pulmonary/Chest: Normal effort with bilateral air entry, clear breath sounds. No stridor. No respiratory distress. Patient exhibits no tenderness. Abdominal: Soft. Bowel sounds audible. No distension or tenderness to palp.    Musculoskeletal: Moves all extremities  Neurological: Patient is alert and follows simple commands      Labs  - Old records and notes have been reviewed in [facility]   ABG  Lab Results   Component Value Date    PH 7.36 11/13/2021    PO2 59 11/13/2021    PCO2 45 11/13/2021    HCO3 26 11/13/2021    O2SAT 89 11/13/2021     Lab Results   Component Value Date    IFIO2 70 11/13/2021    MODE PC 11/05/2021    SETPEEP 8.0 11/15/2021     CBC  Recent Labs     11/25/21  0555   WBC 7.9   RBC 2.76*   HGB 8.3*   HCT 26.1*   MCV 94.6   MCH 30.1   MCHC 31.8*      MPV 9.1*      BMP  Recent Labs     11/24/21  0640 11/25/21  0555 11/26/21  0423   * 129* 129*   K 3.4* 4.0 4.0   CL 96* 94* 93*   CO2 23 23 22*   BUN 34* 41* 54*   CREATININE 2.7* 3.2* 3.5*   GLUCOSE 82 101 106   MG  --   --  1.8   CALCIUM 8.5 8.7 8.4*     LFT  No results for input(s): AST, ALT, ALB, BILITOT, ALKPHOS, LIPASE in the last 72 hours. Invalid input(s): AMYLASE  TROP  Lab Results   Component Value Date    TROPONINT 0.084 10/28/2021    TROPONINT 0.059 10/27/2021    TROPONINT 0.037 03/06/2020     BNP  No results for input(s): BNP in the last 72 hours. Lactic Acid  No results for input(s): LACTA in the last 72 hours. INR  No results for input(s): INR, PROTIME in the last 72 hours. PTT  No results for input(s): APTT in the last 72 hours. Glucose  Recent Labs     11/25/21  2314 11/26/21  0615 11/26/21  1126   POCGLU 110* 110* 93     UA No results for input(s): SPECGRAV, PHUR, COLORU, CLARITYU, MUCUS, PROTEINU, BLOODU, RBCUA, WBCUA, BACTERIA, NITRU, GLUCOSEU, BILIRUBINUR, UROBILINOGEN, KETUA, LABCAST, LABCASTTY, AMORPHOS in the last 72 hours. Invalid input(s): CRYSTALS. PFTs           Sleep studies   Study Results  Initial Study Date -  11/7/19  AHI -  5.8   TotalEvents - 32  (Apneas  19  Hypopneas 13  Central  0)  LM w/Arousals - 0  Sleep Efficiency - 70.8 % (Total Sleep Time - 330 min)  Time with Sats below 88% - 0 min    Cultures    -Anaerobic and aerobic culture: (From Coccyx) Positive for Proteus mirabilis and Pseudomonas aeruginosa, culture also yielded very light growth of Staphylococcus species (coagulase negative), and additional enteric gram negative bacilli  -Respiratory culture on 11/11/21 of bronchial washing:No bacteria seen.    -Molecular pneumonia panel of sputum on 11/15/2021: Negative  -AFB culture with smear: negative  -pneumonia panel of bronchial washings 11/13/2021: Positive for staph aureus and resistant gene meca/c & mrej  -Body fluid culture on 11/5/2021: positive for Staph epidermidis, very light growth Isolates of Methicillin Resistant Staphylococcus coagulase negative (MRSE)  -Respiratory culture on 11/5/2021: Positive for staphylococcal aureus,  EKG     Echocardiogram   Complete 2D ECHO with doppler with color 10/27/21:  Mitral Valve   The mitral valve structure was normal with normal leaflet separation. DOPPLER: The transmitral velocity was within the normal range with no   evidence for mitral stenosis. There was no evidence of mitral   regurgitation. Aortic Valve   The aortic valve was trileaflet with normal thickness and cuspal   separation. DOPPLER: Transaortic velocity was within the normal range with   no evidence of aortic stenosis. There was no evidence of aortic   regurgitation. Tricuspid Valve   The tricuspid valve structure was normal with normal leaflet separation. DOPPLER: There was no evidence of tricuspid stenosis. There was trace   tricuspid regurgitation. Pulmonic Valve   The pulmonic valve leaflets exhibited normal thickness, no calcification,   and normal cuspal separation. DOPPLER: The transpulmonic velocity was   within the normal range with no evidence for regurgitation. Left Atrium   Left atrial size was normal.      Left Ventricle   Normal left ventricular size and systolic function. There were no regional wall motion abnormalities. Wall thickness was within normal limits. Ejection fraction was estimated at 60-65%. Right Atrium   Right atrial size was normal.      Right Ventricle   The right ventricular size was normal with normal systolic function and   wall thickness. Pericardial Effusion   The pericardium was normal in appearance with a small, non-hemodynamically   significant pericardial effusion. Prominent pericardial fat pad. Pleural Effusion   No evidence of pleural effusion. Aorta / Great Vessels   IVC size is dilated with reduced respiratory phasic changes (CVP~10-15   mmHg). Radiology    CXR  PORT CXR 11/19/21:  There is a Dobbhoff tube which projects over the stomach.  There is a left-sided percutaneous nephrostomy tube, stable compared to prior CT. 1. Mild hepatomegaly. Left subclavian dialysis catheter with tip at cavoatrial junction. NG tube passes into stomach. Tracheostomy tube in good position. Right jugular line with catheter tip in right atrium. 2. Tiny bilateral pleural effusions. Moderate pneumonia/pulmonary edema scattered relatively diffusely in both lungs. 3. Overall appearance of chest has worsened somewhat since prior.       CT Scans  (See actual reports for details)    CT chest without contrast on 11/1/21: There are moderate bilateral pleural effusions which have mildly increased in size in the interval. There is adjacent atelectasis. Pulmonary vessels are prominent, similar to prior exam. There are groundglass opacities adjacent to the pulmonary vessels. The heart is enlarged, similar to prior exam. There is a small pericardial effusion, decreased compared to prior exam. Redemonstration of percutaneous cardiac pacer leads and enteric tube. The endotracheal tube is stable with tip in the distal trachea. There is marked thyromegaly which is nodular in appearance, stable compared to prior exam. Visualized upper abdominal solid organs are grossly unremarkable. There are stable degenerative changes of the thoracic spine with exaggerated thoracic kyphosis.         Assessment   -Hemoptysis in the setting of tracheostomy tube--resolved  -Acute hypoxic respiratory failure secondary to severe multi-organism pneumonia with bilateral pleural effusion and repeated mucous plugging   Intubated 10/27/21, extubated 11/9/21, reintubated 11/15/21, tracheostomy tube placed 11/17/21  -S/p bronchoscopy with mucous plug removal from the left mainstem on 11/13/21 by Dr. Maynor Yu in ICU  -Bilateral pleural effusions   -Severe bilateral multiorganism pneumonia: received 14 days of vancomycin, 5 days of rocephin, clinidamycin for 8 days   ANCA associated vasculitis: elevated anti MPO elevated 416 and serine proteinase 3 IgG WNL  -Stage II ALVIN on CKD 3 on HD started 11/20/21  -Hydronephrosis 2/2 Left-sided staghorn calculi  -Acute blood loss anemia: PRBC x1 transfused 10/31/21, 11/4/21, 11/5/21, x2 11/6/21, x3 11/8/21, x3 11/15/21. Heparin stopped (dialysis dose last ran on 11/11/21). -Thrombocytopenia   -Hematuria   -BARBER noncompliant with CPAP     Plan   -The patient is not tolerating to wean the pressure support very well, continue to wean pressure support and then wean to TC as tolerated--discussed with RRT  -Aspiration precautions   -Cautious use of tracheal suction to reduce risk of suction trauma   -Wean FiO2 as tolerated to maintain saturation above 90%, SBT tomorrow will hold on trial today as she is having PEG tube placement      Questions and concerns addressed.     Electronically signed by   Alicia Rudolph MD on 11/26/2021 at 1:26 PM

## 2021-11-26 NOTE — PROGRESS NOTES
Comprehensive Nutrition Assessment    Type and Reason for Visit:  Reassess    Nutrition Recommendations/Plan:   Nepro carb steady at 30 ml/hr (goal). Flush 2.5 oz. Proteinex daily. If no IVF - flush 240 ml free water TID (ok'd by Dr Ellie Gatica on 11/22/21)  Will monitor TF tolerance, labs, weights vs. Need to adjust TF regimen, free water flushes. Nutrition Assessment:    Pt. Improving from a nutritional standpoint AEB tolerating TF at goal.  At risk for further nutrition compromise r/t admitted with acute respiratory failure, intubated 10/27, extubated 11/9, reintubated 11/15, AFib, ALVIN, CRRT & HD  this admit, large worsening sacral wound, stage IV,  11/1 nephrosotmy tube placement, anemia, concern for cholecystitis, TF intolerances this admit, PEG tube placement 11/24/21 and underlying medical condition (CKD, s/p ID of buttock wound 8/6/21,CHF, gout, pulmonary HTN, obesity).  Nutrition recommendations/interventions as per above    Malnutrition Assessment:  Malnutrition Status: At risk for malnutrition (Comment)    Context:  Acute Illness     Findings of the 6 clinical characteristics of malnutrition:  Energy Intake:  1 - 75% or less of estimated energy requirements for 7 or more days  Weight Loss:   (5.8% weight loss in 3.5 months)     Body Fat Loss:  No significant body fat loss     Muscle Mass Loss:  Unable to assess    Fluid Accumulation:  1 - Mild     Strength:  Not Performed    Estimated Daily Nutrient Needs:  Energy (kcal):  2318-7632 kcals (15-18); Weight Used for Energy Requirements:  Admission (75.1 kg)     Protein (g):  63-84 gm (1.5-2) as renal status allows (wounds/HD);  Weight Used for Protein Requirements:  Ideal        Fluid (ml/day):  ~1500 ml (HD); Method Used for Fluid Requirements:  Other (Comment)      Nutrition Related Findings:      Patient intubated 10/27, extubated 11/9, failed swallow evaluation per Speech Therapy 11/10 , reintubated 11/15; +trach  RN reports patient tolerating tube feeding and protein modular, flexiseal with 400ml output/ 24 hours, hemodialysis today; tube feeding order was discontinued for PEG tube placement 11/24, note nursing communication order per Dr Kimberly Samaniego to begin using PEG tube for tube feeding at prior enteral feeding post- PEG tube placement, writer placed tube feeding as order; medication includes humalog, culturelle, reglan, zosyn; Sodium 129, Potassium 4, BUN 54, Creatinine 3.5, Glucose 93    Wounds:  Stage IV (large worsening sacral wound, stage IV, with area of necrosis)       Current Nutrition Therapies:    ADULT TUBE FEEDING; PEG; Renal Formula; Continuous; 30; No; 240; Q 8 hours  Current Tube Feeding (TF) Orders:  · Feeding Route: PEG (placement 11/24/21)  · Formula: Renal Formula (Nepro)  · Schedule: Continuous (goal 30 ml/hr)  · Additives/Modulars:  (flush 1 ( 2.5 oz) liquid protein daily)  · Water Flushes: 240 ml TID if no IVF  · Current TF & Flush Orders Provides: at goal  · Goal TF & Flush Orders Provides: Nepro carb steady at 30 ml/hr with 2.5 oz.  Proteinex daily provides pt. with 1378 kcals (1274 TF, 104 modular), 84 gm protein (58 TF, 26 modular), 115 gm CHO, 18 gm fiber, 1243 ml free water (523 TF, 720 flushes) in 1515 ml volume (720 TF, 75 modular, 720 flushes)      Anthropometric Measures:  · Height: 4' 9\" (144.8 cm)  · Current Body Weight: 166 lb 7.2 oz (75.5 kg) (11/26; post-hemodialysis)   · Admission Body Weight: 165 lb 9.1 oz (75.1 kg) (10/27 +1 edema)    · Usual Body Weight: 175 lb 11.3 oz (79.7 kg) (per EMR 7/16/21)     · Ideal Body Weight: 85 lbs;   · BMI: 36  · Adjusted Body Weight:  ;  (IBW ~93 #)   · BMI Categories: Obese Class 2 (BMI 35.0 -39.9) (on admit)       Nutrition Diagnosis:   · Inadequate oral intake related to swallowing difficulty as evidenced by NPO or clear liquid status due to medical condition, nutrition support - enteral nutrition      Nutrition Interventions:   Food and/or Nutrient Delivery:  Continue Current Tube Feeding  Nutrition Education/Counseling:  Education initiated (11/22 Discussed TF monitoring w/ pt's family)   Coordination of Nutrition Care:  Continue to monitor while inpatient    Goals:  Tube feeding to provide 75% or more of estimated nutrient needs until able to transition to oral diet during LOS       Nutrition Monitoring and Evaluation:   Behavioral-Environmental Outcomes:  None Identified   Food/Nutrient Intake Outcomes:  Enteral Nutrition Intake/Tolerance  Physical Signs/Symptoms Outcomes:  Biochemical Data, Chewing or Swallowing, GI Status, Fluid Status or Edema, Nutrition Focused Physical Findings, Skin, Weight     Discharge Planning:     Too soon to determine     Electronically signed by Grace Bermudez RD, LD on 11/26/21 at 1:40 PM EST    Contact: [phone]

## 2021-11-26 NOTE — FLOWSHEET NOTE
11/26/21 0156   Provider Notification   Reason for Communication Evaluate   Provider Name [facility]   Provider Notification Physician   Method of Communication Secure Message   Response See orders   Notification Time 0125   Notified physician at this time of increased RR and increased O2 needs. Physician placed orders for CXR. See MAR for medication orders. 65: Updated physician at this time on CXR results. Critical finding, New 10% pneumothorax. See Orders.

## 2021-11-26 NOTE — OP NOTE
Department of Radiology  Post Procedure Progress Note      Pre-Procedure Diagnosis:  pneumothorax    Procedure Performed:  Chest tube insertion    Anesthesia: local     Findings: successful    Immediate Complications:  None    Estimated Blood Loss: minimal    SEE DICTATED PROCEDURE NOTE FOR COMPLETE DETAILS.     Lizet García MD   11/26/2021 4:31 PM

## 2021-11-26 NOTE — PROGRESS NOTES
1605 Patient received in IR for Chest tube insertion. This procedure has been fully reviewed with the patient and sister and telephone informed consent has been obtained by sister on the floor. 1620 Procedure started with    1431 Procedure completed; patient tolerated well. Split gauze, op-site; no bleeding noted. [id number] Patient on bed; comfort ensured.  PT getting chest ray in IR before patient goes to the floor. 1642 Patient taken to  via bed.

## 2021-11-26 NOTE — ANESTHESIA POSTPROCEDURE EVALUATION
Department of Anesthesiology  Postprocedure Note    Patient: Suze Gomez  MRN: [id number]  YOB: 1950  Date of evaluation: 11/26/2021  Time:  4:19 PM     Procedure Summary     Date: 11/24/21 Room / Location: [facility] / [address] [place] [address]    Anesthesia Start: 3284 Anesthesia Stop: 2895    Procedure: EGD PEG TUBE PLACEMENT (N/A Abdomen) Diagnosis: (DYSPHAGIA)    Surgeons: Cynthia Rivera MD Responsible Provider: Jeremiah Weinstein MD    Anesthesia Type: general ASA Status: 4          Anesthesia Type: general    Michelle Phase I: Michelle Score: 8    Michelle Phase II:      Last vitals: Reviewed and per EMR flowsheets.        Anesthesia Post Evaluation

## 2021-11-26 NOTE — PROGRESS NOTES
Renal Progress Note    Assessment and Plan:   1. Acute kidney injury with serum creatinine worse today and tend to increase with dialysis treatment  2. Hypotension appears to be resolving  3. Coccygeal wound infection  4. Hyponatremia likely from acute kidney injury and volume overload  5. Right hydronephrosis status post right nephrostomy tube placement  6. Recent CRRT  7. Positive RYLAN  8. Positive ANCA  9. Deconditioning  10. Respiratory failure on tracheostomy vent  11. Atrial fibrillation    PLAN:  1. Series of lab results reviewed  2. Result discussed with the patient who is awake and alert  3. Medications reviewed  4. No changes  5. Urine eosinophils in view of antibiotic  6. Hemodialysis in progress  7. Tolerating treatment well so far  8. Hemodynamically stable  9. Repeat blood cultures as I do not see any ordered  10. Labs in the morning  11.  We will follow    Patient Active Problem List:     HTN (hypertension)     Chronic depression     CHF (congestive heart failure) (HCC)     Thrombocytopenia (HCC)     Moderate episode of recurrent major depressive disorder (HCC)     Renal failure syndrome     Hyperkalemia     Isolated non-nephrotic proteinuria     ALVIN (acute kidney injury) ([facility] [address]     Chronic right-sided heart failure (HCC)     Pulmonary HTN (HCC)     Hypotension due to hypovolemia     Acute renal failure superimposed on stage 4 chronic kidney disease (HCC)     Pressure ulcer of right buttock, stage 3 (HCC)     Acute respiratory failure (HCC)     Flash pulmonary edema (HCC)     Shock (HCC)     Aspiration pneumonia of left lung (HCC)     Pleural effusion     Paroxysmal atrial fibrillation (HCC)     Hemoptysis     Chronic respiratory failure with hypoxia (HCC)      Subjective:   Admit Date: 10/27/2021    Interval History:   Seen for acute kidney injury  Very awake and very alert  No issues from staff  Blood pressure is good  Urine output remains quite low      Medications:   Scheduled Meds:   sodium chloride flush  5-40 mL IntraVENous 2 times per day    metoclopramide  5 mg IntraVENous Q8H    sodium chloride (Inhalant)  4 mL Nebulization Q6H    And    albuterol  2.5 mg Nebulization Q6H    lactobacillus  1 capsule Per NG tube Daily with breakfast    piperacillin-tazobactam  3,375 mg IntraVENous Q12H    chlorhexidine  15 mL Mouth/Throat BID    ketamine  100 mg IntraVENous Once    lidocaine 1 % injection  5 mL IntraDERmal Once    insulin lispro  0-6 Units SubCUTAneous Q6H    allopurinol  100 mg Oral Daily    phosphorus replacement protocol   Other RX Placeholder    potassium bicarb-citric acid  40 mEq Oral Once    tiotropium-olodaterol  2 puff Inhalation Daily    [Held by provider] aspirin  81 mg Oral Daily    pantoprazole  40 mg IntraVENous QAM    sodium hypochlorite   Irrigation Daily    Riociguat  2.5 mg Oral Q8H    FLUoxetine  40 mg Oral Daily    [Held by provider] metoprolol tartrate  12.5 mg Oral BID     Continuous Infusions:   sodium chloride      dextrose Stopped (11/25/21 0336)    [Held by provider] sodium chloride 50 mL/hr at 10/28/21 2314       CBC:   Recent Labs     11/25/21  0555   WBC 7.9   HGB 8.3*        CMP:    Recent Labs     11/24/21  0640 11/25/21  0555 11/26/21  0423   * 129* 129*   K 3.4* 4.0 4.0   CL 96* 94* 93*   CO2 23 23 22*   BUN 34* 41* 54*   CREATININE 2.7* 3.2* 3.5*   GLUCOSE 82 101 106   CALCIUM 8.5 8.7 8.4*   LABGLOM 17* 14* 13*     Troponin: No results for input(s): TROPONINI in the last 72 hours. BNP: No results for input(s): BNP in the last 72 hours. INR: No results for input(s): INR in the last 72 hours. Lipids: No results for input(s): CHOL, LDLDIRECT, TRIG, HDL, AMYLASE, LIPASE in the last 72 hours. Liver:   No results for input(s): AST, ALT, ALKPHOS, PROT, LABALBU, BILITOT in the last 72 hours. Invalid input(s): BILDIR  Iron:  No results for input(s): IRONS, FERRITIN in the last 72 hours.     Invalid input(s): LABIRONS  XR CHEST PORTABLE   Final Result   Impression:   1. New 10% pneumothorax with air collection on the medial aspect of the    right lung. 2. Cardiomegaly and diffuse bilateral interstitial and alveolar opacities    consistent with pulmonary edema. 3. Retrocardiac atelectasis/consolidation and pleural fluid obscuring the    left diaphragm. This document has been electronically signed by: Ana Delgado MD on    11/26/2021 03:52 AM         XR CHEST PORTABLE   Final Result   Dobbhoff tube projects over the stomach. **This report has been created using voice recognition software. It may contain minor errors which are inherent in voice recognition technology. **      Final report electronically signed by Dr. Evelyn Guerrero MD on 11/19/2021 1:28 PM      XR CHEST PORTABLE   Final Result   1. Mild hepatomegaly. Left subclavian dialysis catheter with tip at cavoatrial junction. NG tube passes into stomach. Tracheostomy tube in good position. Right jugular line with catheter tip in right atrium. 2. Tiny bilateral pleural effusions. Moderate pneumonia/pulmonary edema scattered relatively diffusely in both lungs. 3. Overall appearance of chest has worsened somewhat since prior. **This report has been created using voice recognition software. It may contain minor errors which are inherent in voice recognition technology. **      Final report electronically signed by Dr. Nelson Joaquin on 11/19/2021 1:20 AM      XR CHEST PORTABLE   Final Result   1. Interval placement of tracheostomy. Remaining support lines and tubes    as above. 2. Improved aeration of the left lung. Minimally worsened airspace disease    of the right lung with interstitial densities and confluent opacities of    the right hilar region. This document has been electronically signed by: Socorro Beltrna DO on    11/18/2021 03:14 AM      XR CHEST PORTABLE   Final Result   1. Cardiomegaly. Tracheostomy tube.  NG tube passes into stomach. Right jugular line with tip at cavoatrial junction. Left subclavian dialysis catheter, tip also at cavoatrial junction. Tiny bilateral effusions. .   2. Moderate pneumonia/pulmonary edema scattered diffusely in both lungs. Overall appearance has worsened since earlier. Cannot exclude heart failure. **This report has been created using voice recognition software. It may contain minor errors which are inherent in voice recognition technology. **      Final report electronically signed by Dr. Marina Aponte on 11/17/2021 5:46 PM      CT ABDOMEN PELVIS WO CONTRAST Additional Contrast? None   Final Result   Left nephrostomy tube with perinephric stranding and minimal mesenteric edema/ascites. No convincing evidence of an abscess collection. Possible osteomyelitis with the questionable cortical destruction along the dorsal aspect of the coccyx. **This report has been created using voice recognition software. It may contain minor errors which are inherent in voice recognition technology. **      Final report electronically signed by Dr. Radha Muller on 11/17/2021 10:52 AM      XR CHEST PORTABLE   Final Result   1. Stable left lung with improved aeration throughout the entirety of the    right lung. Remaining interstitial densities greatest involving the right    central lung and right lung base with residual small right basilar    effusion. 2. Support lines and tubes as above. This document has been electronically signed by: Shirlene Medellin DO on    11/17/2021 04:31 AM      XR CHEST PORTABLE   Final Result   Persistent small bilateral pleural effusions with bibasilar opacities. **This report has been created using voice recognition software. It may contain minor errors which are inherent in voice recognition technology. **      Final report electronically signed by Dr. Colletta Chamber on 11/16/2021 7:24 AM      XR CHEST PORTABLE   Final Result   The endotracheal tube has been retracted and now terminates approximately 3.5 cm above the level of the avelina. Bilateral pleural effusions and bilateral lower lobe consolidation as well as diffuse interstitial opacities are unchanged. **This report has been created using voice recognition software. It may contain minor errors which are inherent in voice recognition technology. **      Final report electronically signed by Dr Saintclair Like on 11/15/2021 12:45 PM      XR CHEST PORTABLE   Final Result   Bilateral pleural effusions and bilateral lower lobe airspace opacities as well as diffuse interstitial opacities are unchanged. Life-support and monitoring devices appear stable. **This report has been created using voice recognition software. It may contain minor errors which are inherent in voice recognition technology. **      Final report electronically signed by Dr Saintclair Like on 11/15/2021 12:39 PM      XR CHEST PORTABLE   Final Result   Impression:   Diffuse interstitial and airspace disease bilaterally. Aeration slightly    improved. Bilateral pleural effusions. This document has been electronically signed by: Nani Gowers, MD on    11/15/2021 04:09 AM      XR CHEST PORTABLE   Final Result   Impression:   Bilateral lung infiltrates, and effusion, similar to prior study. This document has been electronically signed by: Nalini Diamond MD on    11/14/2021 04:49 AM      XR CHEST PORTABLE   Final Result   1. Mild improvement in the aeration of the left upper lobe. 2. Worsening of the airspace disease in the right lung. 3. Severe diffuse airspace disease is present. 4. Moderate left pleural effusion. Mild right pleural effusion. 5. Other findings as described above. **This report has been created using voice recognition software. It may contain minor errors which are inherent in voice recognition technology. **      Final report electronically signed by Dr Marta Winters on 11/13/2021 10:14 AM      XR CHEST PORTABLE   Final Result   Complete opacification the left lung consistent with history of mucous plugging. No other changes from earlier            **This report has been created using voice recognition software. It may contain minor errors which are inherent in voice recognition technology. **      Final report electronically signed by Dr. Kenan Carreno on 11/13/2021 1:20 AM      XR CHEST PORTABLE   Final Result   1. Possible interval decrease in left lung volume secondary to atelectasis    or lobar collapse, consolidation of the parenchyma, or enlargement pleural    effusion. Further evaluation with cross-sectional imaging may be    considered to better characterize. There is also decreased lung volume on    the right with dense consolidation of the lower lung. These findings may    reflect acute respiratory distress syndrome, or multifocal pneumonia. This document has been electronically signed by: Dion Waite MD on    11/12/2021 07:25 AM      XR CHEST PORTABLE   Final Result   No pneumothorax. Improved aeration of the left upper lobe            **This report has been created using voice recognition software. It may contain minor errors which are inherent in voice recognition technology. **      Final report electronically signed by Dr. Kenan Carreno on 11/11/2021 7:35 PM      XR CHEST PORTABLE   Final Result      1. Significantly worse appearance of the chest with complete opacification of the left hemithorax. This could be on the basis of a large pleural effusion. Left lung collapse could have a similar appearance. 2. There is a nasogastric tube coursing through the esophagus and terminating below the level of the diaphragm. 3. Infiltrates are seen throughout the right lung. Small right pleural effusion. **This report has been created using voice recognition software. It may contain minor errors which are inherent in voice recognition technology. **      Final report electronically signed by Dr Anup Ling on 11/11/2021 4:26 PM      XR CHEST PORTABLE   Final Result   Impression:   Left basilar consolidation, increased since the prior study. This document has been electronically signed by: Robbi Flores MD on    11/11/2021 04:34 AM      CT ABDOMEN PELVIS WO CONTRAST Additional Contrast? None   Final Result   1. Very limited evaluation. Large bilateral pleural effusions and bilateral airspace infiltrates. #2 subcutaneous edema throughout the abdomen and pelvis indicating presence of third spacing. 3. Presence or absence of a perinephric abscess cannot be confirmed. **This report has been created using voice recognition software. It may contain minor errors which are inherent in voice recognition technology. **      Final report electronically signed by Dr. Keila Almeida on 11/9/2021 11:31 PM      XR CHEST PORTABLE   Final Result   Impression:   Progressive left base consolidation. Improved aeration elsewhere. This document has been electronically signed by: Safia Ortiz MD on    11/09/2021 04:11 AM      XR CHEST PORTABLE   Final Result   Impression:   1. Extensive bilateral areas of airspace disease which have improved since    the previous exam.   2. Moderate left pleural effusion. This document has been electronically signed by: Evelyne Cogan, MD on    11/07/2021 10:06 AM      XR CHEST PORTABLE   Final Result   1. New left subclavian catheter with the tip in the right atrium   2. No change in endotracheal tube, enteric tube and right internal jugular line. 3. Cardiomegaly. 4. Extensive bilateral pulmonary opacification, approximately unchanged. .               **This report has been created using voice recognition software. It may contain minor errors which are inherent in voice recognition technology. **      Final report electronically signed by DR Diya Ballard on 11/5/2021 3:37 PM      XR CHEST PORTABLE   Final Result   Impression:   1.  Lines and tubes appropriate as above. 2. Increasing airspace consolidation and persistent cardiomegaly with    pleural effusion on the left. This document has been electronically signed by: Chris Mcintosh MD on    11/05/2021 08:05 AM      CT ABDOMEN PELVIS WO CONTRAST Additional Contrast? None   Final Result       1. Interval percutaneous left nephrostomy tube placement with small amount of blood in the renal pelvis about the nephrostomy tube. 2. Hypodense lesion in the anterior aspect of the interpolar region of the left kidney with small focus of air at the margin. Developing abscess can't be excluded. 3. Scattered foci of air elsewhere in the kidney likely sequelae of nephrostomy tube placement. 4. Mildly worsening anasarca with persistent small to moderate ascites and moderate pleural effusions which may be cardiogenic given cardiomegaly. 5. Worsening opacities adjacent to the pleural effusions as evidence for worsening atelectasis or infiltrates. 6. Small hemopericardium, stable compared to prior exam.   7. Stable retroperitoneal periaortic lymphadenopathy at the level of the left kidney. **This report has been created using voice recognition software. It may contain minor errors which are inherent in voice recognition technology. **      Final report electronically signed by Dr. Simone Tejeda MD on 11/4/2021 1:24 PM      XR CHEST PORTABLE   Final Result   Worsening prominence of the pulmonary interstitium suggesting pulmonary edema versus infiltrates. Small bilateral pleural effusions. **This report has been created using voice recognition software. It may contain minor errors which are inherent in voice recognition technology. **      Final report electronically signed by Dr. Josef Drew on 11/3/2021 7:58 AM      CT CHEST WO CONTRAST   Final Result       1. Moderate bilateral pleural effusions have increased in size in the interval with associated adjacent atelectasis.    2. Persistent pulmonary vascular congestion/edema with cardiomegaly. 3. Decreased pericardial effusion. **This report has been created using voice recognition software. It may contain minor errors which are inherent in voice recognition technology. **      Final report electronically signed by Dr. Karla Rock MD on 11/1/2021 3:21 PM      IR GUIDED NEPHROSTOMY CATH PLACEMENT LEFT   Final Result   1. Large staghorn calculus in the left kidney. 2. Status post successful nephrostomy tube insertion. **This report has been created using voice recognition software. It may contain minor errors which are inherent in voice recognition technology. **      Final report electronically signed by Dr. Jeffery Jefferson on 11/1/2021 5:10 PM      CT ABDOMEN PELVIS WO CONTRAST   Final Result      1. Bilateral pleural effusions and abnormal densities in the right and left lower lobes consistent with inflammatory process. 2. Cardiomegaly. Small pericardial effusion. 3. Small amount of fluid surrounding the liver. 4. Gallstone. Increased attenuation in the gallbladder. No pericholecystic fluid or biliary dilatation. 5. Large staghorn calculus in the left kidney. Severe left-sided hydronephrosis and hydroureter. Increased density in the left perinephric fat consistent with inflammatory process. 6. Atherosclerotic calcification in the abdominal aorta, iliac and femoral arteries. 7. Padilla catheter within the bladder. 8. Lumbar spondylosis. 9. Increased density in the skin and subcutaneous soft tissues suggestive of edema or anasarca. 10. Enteric tube. Benedicta-Kasia catheter in place, seen better on plain radiographs         **This report has been created using voice recognition software. It may contain minor errors which are inherent in voice recognition technology. **      Final report electronically signed by DR Gerda Bell on 10/31/2021 11:19 AM      XR CHEST PORTABLE   Final Result   1.  Small bilateral pleural effusions. 2. Bilateral pneumonia. 3. Stable cardiomegaly. **This report has been created using voice recognition software. It may contain minor errors which are inherent in voice recognition technology. **      Final report electronically signed by Dr. Shawn Helms on 10/31/2021 12:43 AM      US LIVER   Final Result   1. Cholelithiasis and pericholecystic fluid suspicious for acute cholecystitis. 2. Normal liver ultrasound. Final report electronically signed by Dr. Shawn Helms on 10/30/2021 11:26 PM      [address]   Final Result   1. Cholelithiasis and pericholecystic fluid suspicious for acute cholecystitis. 2. Normal liver ultrasound. Final report electronically signed by Dr. Shawn Helms on 10/30/2021 11:26 PM      CT CHEST W WO CONTRAST   Final Result   1. Moderate to marked right pleural effusion. Moderate left pleural effusion. 2. Complete collapse of the right lower lobe. Near-complete collapse of the left lower lobe. 3. Consolidative opacity seen in the left lower lobe may reflect infectious etiology. 4. Markedly enlarged thyroid. Left thyroid nodule. 5. Main pulmonary artery is dilated relating to pulmonary artery hypertension. 6. The common bile duct is dilated at 10 mm. A large gallstone is present. Staghorn calculus of the left kidney. 7. Other findings as described above. .            **This report has been created using voice recognition software. It may contain minor errors which are inherent in voice recognition technology. **      Final report electronically signed by Dr Marta Winters on 10/29/2021 11:35 PM      XR CHEST PORTABLE   Final Result   Diffuse infiltrates throughout the lungs bilaterally with small bilateral pleural effusions. Somewhat worse appearance of the chest when compared to the prior study. **This report has been created using voice recognition software.  It may contain minor errors which are inherent in voice recognition technology. **      Final report electronically signed by Dr Mk Lebron on 10/29/2021 9:43 AM      XR CHEST PORTABLE   Final Result   Impression:   1. Persistent left lower lobe atelectasis or consolidation. 2. Cardiomegaly with passive venous congestion. 3. Supportive devices in situ. This document has been electronically signed by: Allyn Mcardle, MD on    10/28/2021 04:30 AM      XR CHEST PORTABLE   Final Result   1. The endotracheal tube terminates 9 mm above the avelina and should be withdrawn about 1-2 cm. Reimaging should be obtained. 2. Increased interstitial pulmonary markings are seen bilaterally. Patchy opacities seen in the right lung. 3. Mild cardiomegaly            **This report has been created using voice recognition software. It may contain minor errors which are inherent in voice recognition technology. **      Final report electronically signed by Dr Mora Yu on 10/27/2021 5:32 PM      XR CHEST PORTABLE   Final Result   Status post drainage of the left pleural effusion with improved aeration noted in the left hemithorax. No pneumothorax observed. Cardiomegaly is stable. Life support and monitoring devices are unchanged. **This report has been created using voice recognition software. It may contain minor errors which are inherent in voice recognition technology. **      Final report electronically signed by Dr Joel Griffith on 10/27/2021 4:18 PM      US THORACENTESIS Which side should the procedure be performed? Left   Final Result   Status post thoracentesis            **This report has been created using voice recognition software. It may contain minor errors which are inherent in voice recognition technology. **      Final report electronically signed by Dr. Marina Aponte on 10/27/2021 4:33 PM      XR CHEST PORTABLE   Final Result      The endotracheal tube has been adjusted with tip now terminating approximately 18 mm above the level of the avelina. Improved aeration is noted in the left upper lobe and right lower lobe. Persistent dense consolidation in the left mid and lower lobe are    observed. **This report has been created using voice recognition software. It may contain minor errors which are inherent in voice recognition technology. **      Final report electronically signed by Dr Iftikhar Willis on 10/27/2021 3:41 PM      XR CHEST PORTABLE   Final Result      The endotracheal tube tip terminates in the right mainstem bronchus. Improved aeration is noted in the right lower lobe and left hemithorax. There continues to be dense consolidation in the left upper lobe and left lower lobe. Cardiomegaly is stable. COMMUNICATION: The results of this examination were discussed with Dr. Claudeen Mayans at 3:15 PM on 10/27/2021. **This report has been created using voice recognition software. It may contain minor errors which are inherent in voice recognition technology. **      Final report electronically signed by Dr Iftikhar Willis on 10/27/2021 3:16 PM      XR CHEST PORTABLE   Final Result      Near complete opacification of the left hemithorax with very little aerated lung visualized in the left upper lobe. Small right pleural effusion and right lower lobe consolidation obscures visualization of the right hemidiaphragm pulmonary vascular    congestion is observed. **This report has been created using voice recognition software. It may contain minor errors which are inherent in voice recognition technology. **      Final report electronically signed by Dr Iftikhar Willis on 10/27/2021 2:20 PM      XR CHEST PORTABLE    (Results Pending)         Objective:   Vitals: /61   Pulse 92   Temp 98 °F (36.7 °C)   Resp 20   Ht 4' 9\" (1.448 m)   Wt 169 lb 12.1 oz (77 kg)   SpO2 97%   BMI 36.73 kg/m²    Wt Readings from Last 3 Encounters:   11/26/21 169 lb 12.1 oz (77 kg)   10/25/21 164 lb (74.4 kg) 09/29/21 160 lb (72.6 kg)      24HR INTAKE/OUTPUT:      Intake/Output Summary (Last 24 hours) at 11/26/2021 1127  Last data filed at 11/26/2021 1030  Gross per 24 hour   Intake 1247.48 ml   Output 645 ml   Net 602.48 ml       Constitutional: Well-developed elderly lady awake and alert on mechanical support via tracheostomy , no apparent distress   Skin:normal with no rash or lesions. HEENT: Head is normal.Throat is clear . Oral mucosa is moist.  Neck: Tracheostomy attached to mechanical support is noted  Cardiovascular: Irregular heartbeat  Respiratory: Bilateral rhonchi  Abdomen: Soft. Good bowel sounds. Right nephrostomy tube is noted. .  PEG tube is noted. Ext: No LE edema  Musculoskeletal:Intact  Neuro: Awake and alert. Obeys commands. Electronically signed by Vandana Carr MD on 11/26/2021 at 11:27 AM  **This report has been created using voice recognition software. It maycontain minor  errors which are inherent in voice recognition technology. **

## 2021-11-26 NOTE — H&P
Formulation and discussion of sedation / procedure plans, risks, benefits, side effects and alternatives with patient and/or responsible adult completed.     Electronically signed by Elizabeth Fagan MD on 11/26/2021 at 4:31 PM

## 2021-11-26 NOTE — PROGRESS NOTES
Dr Citlali Mahan reviewed imaging for requested chest tube insertion and advised that pneumo is too small at only 10% and is in a difficult spot medially for a chest to be inserted. Recommended a follow up CT chest for further evaluation. Pt nurse, Shad Castrejon, made aware.

## 2021-11-26 NOTE — FLOWSHEET NOTE
11/26/21 0750 11/26/21 1148   Vital Signs   /61 119/63   Temp 98 °F (36.7 °C) 97.6 °F (36.4 °C)   Pulse 101 100   Resp 22 22   Weight 169 lb 12.1 oz (77 kg) 166 lb 7.2 oz (75.5 kg)   Weight Method Bed scale Bed scale   Percent Weight Change 0 -1.95   Post-Hemodialysis Assessment   Post-Treatment Procedures  --  Blood returned; Catheter Capped, clamped with Saline x2 ports   Machine Disinfection Process  --  Acid/Vinegar Clean; Heat Disinfect; Exterior Machine Disinfection   Rinseback Volume (ml)  --  400 ml   Total Liters Processed (l/min)  --  60.5 l/min   Dialyzer Clearance  --  Lightly streaked   Duration of Treatment (minutes)  --  210 minutes   Heparin amount administered during treatment (units)  --  0 units   Hemodialysis Intake (ml)  --  400 ml   Hemodialysis Output (ml)  --  1900 ml   NET Removed (ml)  --  1500 ml   Tolerated Treatment  --  Good   Stable 3.5hr tx. 1.5L removed during HD; some hypotension noted. Both ports of hemodialysis catheter flushed and clamped per protocol. Treatment record printed for scanning. Report given to primary RN.

## 2021-11-27 ENCOUNTER — APPOINTMENT (OUTPATIENT)
Dept: GENERAL RADIOLOGY | Age: 71
DRG: 004 | End: 2021-11-27
Payer: MEDICARE

## 2021-11-27 LAB
ANION GAP SERPL CALCULATED.3IONS-SCNC: 9 MEQ/L (ref 8–16)
BASOPHILS # BLD: 0.7 %
BASOPHILS ABSOLUTE: 0.1 THOU/MM3 (ref 0–0.1)
BUN BLDV-MCNC: 30 MG/DL (ref 7–22)
CALCIUM IONIZED: 1.22 MMOL/L (ref 1.12–1.32)
CALCIUM SERPL-MCNC: 8.4 MG/DL (ref 8.5–10.5)
CHLORIDE BLD-SCNC: 99 MEQ/L (ref 98–111)
CO2: 26 MEQ/L (ref 23–33)
CREAT SERPL-MCNC: 2.5 MG/DL (ref 0.4–1.2)
EOSINOPHIL # BLD: 3.1 %
EOSINOPHILS ABSOLUTE: 0.2 THOU/MM3 (ref 0–0.4)
ERYTHROCYTE [DISTWIDTH] IN BLOOD BY AUTOMATED COUNT: 16.7 % (ref 11.5–14.5)
ERYTHROCYTE [DISTWIDTH] IN BLOOD BY AUTOMATED COUNT: 58.1 FL (ref 35–45)
GFR SERPL CREATININE-BSD FRML MDRD: 19 ML/MIN/1.73M2
GLUCOSE BLD-MCNC: 111 MG/DL (ref 70–108)
GLUCOSE BLD-MCNC: 113 MG/DL (ref 70–108)
GLUCOSE BLD-MCNC: 117 MG/DL (ref 70–108)
GLUCOSE BLD-MCNC: 117 MG/DL (ref 70–108)
GLUCOSE BLD-MCNC: 128 MG/DL (ref 70–108)
HCT VFR BLD CALC: 25.3 % (ref 37–47)
HEMOGLOBIN: 7.9 GM/DL (ref 12–16)
IMMATURE GRANS (ABS): 0.07 THOU/MM3 (ref 0–0.07)
IMMATURE GRANULOCYTES: 1 %
LYMPHOCYTES # BLD: 7.3 %
LYMPHOCYTES ABSOLUTE: 0.5 THOU/MM3 (ref 1–4.8)
MAGNESIUM: 1.7 MG/DL (ref 1.6–2.4)
MCH RBC QN AUTO: 29.7 PG (ref 26–33)
MCHC RBC AUTO-ENTMCNC: 31.2 GM/DL (ref 32.2–35.5)
MCV RBC AUTO: 95.1 FL (ref 81–99)
MONOCYTES # BLD: 8.2 %
MONOCYTES ABSOLUTE: 0.6 THOU/MM3 (ref 0.4–1.3)
NUCLEATED RED BLOOD CELLS: 0 /100 WBC
PLATELET # BLD: 213 THOU/MM3 (ref 130–400)
PMV BLD AUTO: 8.7 FL (ref 9.4–12.4)
POTASSIUM REFLEX MAGNESIUM: 3.9 MEQ/L (ref 3.5–5.2)
RBC # BLD: 2.66 MILL/MM3 (ref 4.2–5.4)
SEG NEUTROPHILS: 79.7 %
SEGMENTED NEUTROPHILS ABSOLUTE COUNT: 5.8 THOU/MM3 (ref 1.8–7.7)
SODIUM BLD-SCNC: 134 MEQ/L (ref 135–145)
WBC # BLD: 7.3 THOU/MM3 (ref 4.8–10.8)

## 2021-11-27 PROCEDURE — 6360000002 HC RX W HCPCS: Performed by: NURSE PRACTITIONER

## 2021-11-27 PROCEDURE — 6370000000 HC RX 637 (ALT 250 FOR IP): Performed by: STUDENT IN AN ORGANIZED HEALTH CARE EDUCATION/TRAINING PROGRAM

## 2021-11-27 PROCEDURE — 83735 ASSAY OF MAGNESIUM: CPT

## 2021-11-27 PROCEDURE — 6360000002 HC RX W HCPCS: Performed by: FAMILY MEDICINE

## 2021-11-27 PROCEDURE — 85025 COMPLETE CBC W/AUTO DIFF WBC: CPT

## 2021-11-27 PROCEDURE — 82330 ASSAY OF CALCIUM: CPT

## 2021-11-27 PROCEDURE — C9113 INJ PANTOPRAZOLE SODIUM, VIA: HCPCS | Performed by: NURSE PRACTITIONER

## 2021-11-27 PROCEDURE — 2580000003 HC RX 258: Performed by: INTERNAL MEDICINE

## 2021-11-27 PROCEDURE — 99232 SBSQ HOSP IP/OBS MODERATE 35: CPT | Performed by: INTERNAL MEDICINE

## 2021-11-27 PROCEDURE — 94003 VENT MGMT INPAT SUBQ DAY: CPT

## 2021-11-27 PROCEDURE — 36415 COLL VENOUS BLD VENIPUNCTURE: CPT

## 2021-11-27 PROCEDURE — 2060000000 HC ICU INTERMEDIATE R&B

## 2021-11-27 PROCEDURE — 94761 N-INVAS EAR/PLS OXIMETRY MLT: CPT

## 2021-11-27 PROCEDURE — 6360000002 HC RX W HCPCS: Performed by: STUDENT IN AN ORGANIZED HEALTH CARE EDUCATION/TRAINING PROGRAM

## 2021-11-27 PROCEDURE — 99233 SBSQ HOSP IP/OBS HIGH 50: CPT | Performed by: INTERNAL MEDICINE

## 2021-11-27 PROCEDURE — 6360000002 HC RX W HCPCS: Performed by: PHARMACIST

## 2021-11-27 PROCEDURE — 2580000003 HC RX 258: Performed by: STUDENT IN AN ORGANIZED HEALTH CARE EDUCATION/TRAINING PROGRAM

## 2021-11-27 PROCEDURE — 6370000000 HC RX 637 (ALT 250 FOR IP): Performed by: INTERNAL MEDICINE

## 2021-11-27 PROCEDURE — 71045 X-RAY EXAM CHEST 1 VIEW: CPT

## 2021-11-27 PROCEDURE — 6370000000 HC RX 637 (ALT 250 FOR IP): Performed by: FAMILY MEDICINE

## 2021-11-27 PROCEDURE — 87040 BLOOD CULTURE FOR BACTERIA: CPT

## 2021-11-27 PROCEDURE — 80048 BASIC METABOLIC PNL TOTAL CA: CPT

## 2021-11-27 PROCEDURE — 94640 AIRWAY INHALATION TREATMENT: CPT

## 2021-11-27 PROCEDURE — 2700000000 HC OXYGEN THERAPY PER DAY

## 2021-11-27 PROCEDURE — 6360000002 HC RX W HCPCS: Performed by: INTERNAL MEDICINE

## 2021-11-27 PROCEDURE — 6370000000 HC RX 637 (ALT 250 FOR IP): Performed by: NURSE PRACTITIONER

## 2021-11-27 PROCEDURE — 82948 REAGENT STRIP/BLOOD GLUCOSE: CPT

## 2021-11-27 RX ADMIN — ALBUTEROL SULFATE 2.5 MG: 2.5 SOLUTION RESPIRATORY (INHALATION) at 00:05

## 2021-11-27 RX ADMIN — Medication 1 CAPSULE: at 08:33

## 2021-11-27 RX ADMIN — SODIUM CHLORIDE SOLN NEBU 3% 4 ML: 3 NEBU SOLN at 00:05

## 2021-11-27 RX ADMIN — SODIUM CHLORIDE, PRESERVATIVE FREE 10 ML: 5 INJECTION INTRAVENOUS at 10:57

## 2021-11-27 RX ADMIN — SODIUM CHLORIDE, PRESERVATIVE FREE 10 ML: 5 INJECTION INTRAVENOUS at 16:16

## 2021-11-27 RX ADMIN — ALBUTEROL SULFATE 2.5 MG: 2.5 SOLUTION RESPIRATORY (INHALATION) at 11:19

## 2021-11-27 RX ADMIN — SODIUM CHLORIDE SOLN NEBU 3% 4 ML: 3 NEBU SOLN at 06:13

## 2021-11-27 RX ADMIN — LORAZEPAM 1 MG: 2 INJECTION INTRAMUSCULAR; INTRAVENOUS at 22:57

## 2021-11-27 RX ADMIN — METOCLOPRAMIDE HYDROCHLORIDE 5 MG: 5 INJECTION INTRAMUSCULAR; INTRAVENOUS at 02:10

## 2021-11-27 RX ADMIN — SODIUM CHLORIDE, PRESERVATIVE FREE 10 ML: 5 INJECTION INTRAVENOUS at 08:31

## 2021-11-27 RX ADMIN — METOPROLOL TARTRATE 12.5 MG: 25 TABLET, FILM COATED ORAL at 22:20

## 2021-11-27 RX ADMIN — TIOTROPIUM BROMIDE AND OLODATEROL 2 PUFF: 3.124; 2.736 SPRAY, METERED RESPIRATORY (INHALATION) at 10:00

## 2021-11-27 RX ADMIN — METOPROLOL TARTRATE 12.5 MG: 25 TABLET, FILM COATED ORAL at 12:24

## 2021-11-27 RX ADMIN — PANTOPRAZOLE SODIUM 40 MG: 40 INJECTION, POWDER, FOR SOLUTION INTRAVENOUS at 08:32

## 2021-11-27 RX ADMIN — SODIUM CHLORIDE SOLN NEBU 3% 4 ML: 3 NEBU SOLN at 11:21

## 2021-11-27 RX ADMIN — CHLORHEXIDINE GLUCONATE 15 ML: 1.2 RINSE ORAL at 22:20

## 2021-11-27 RX ADMIN — CHLORHEXIDINE GLUCONATE 15 ML: 1.2 RINSE ORAL at 08:31

## 2021-11-27 RX ADMIN — SODIUM CHLORIDE SOLN NEBU 3% 4 ML: 3 NEBU SOLN at 18:35

## 2021-11-27 RX ADMIN — SODIUM CHLORIDE, PRESERVATIVE FREE 10 ML: 5 INJECTION INTRAVENOUS at 22:24

## 2021-11-27 RX ADMIN — FLUOXETINE 40 MG: 20 CAPSULE ORAL at 08:33

## 2021-11-27 RX ADMIN — ALLOPURINOL 100 MG: 100 TABLET ORAL at 08:33

## 2021-11-27 RX ADMIN — PIPERACILLIN AND TAZOBACTAM 3375 MG: 3; .375 INJECTION, POWDER, LYOPHILIZED, FOR SOLUTION INTRAVENOUS at 22:24

## 2021-11-27 RX ADMIN — ALBUTEROL SULFATE 2.5 MG: 2.5 SOLUTION RESPIRATORY (INHALATION) at 06:13

## 2021-11-27 RX ADMIN — SODIUM HYPOCHLORITE: 1.25 SOLUTION TOPICAL at 07:32

## 2021-11-27 RX ADMIN — METOCLOPRAMIDE HYDROCHLORIDE 5 MG: 5 INJECTION INTRAMUSCULAR; INTRAVENOUS at 08:33

## 2021-11-27 RX ADMIN — ALBUTEROL SULFATE 2.5 MG: 2.5 SOLUTION RESPIRATORY (INHALATION) at 18:35

## 2021-11-27 RX ADMIN — PIPERACILLIN AND TAZOBACTAM 3375 MG: 3; .375 INJECTION, POWDER, LYOPHILIZED, FOR SOLUTION INTRAVENOUS at 11:00

## 2021-11-27 RX ADMIN — METOCLOPRAMIDE HYDROCHLORIDE 5 MG: 5 INJECTION INTRAMUSCULAR; INTRAVENOUS at 16:16

## 2021-11-27 ASSESSMENT — PULMONARY FUNCTION TESTS
PIF_VALUE: 16

## 2021-11-27 ASSESSMENT — PAIN SCALES - GENERAL
PAINLEVEL_OUTOF10: 0

## 2021-11-27 NOTE — PROGRESS NOTES
HOSPITALIST PROGRESS NOTE    Patient - Alireza Cueva   MRN -  [id number]   Thomas # - [de-identified]   - 1950      Date of Admission -  10/27/2021  1:36 PM  Date of evaluation -  2021  Room - [address] [place] [address]-A   Hospital Day - 32  Primary Care Physician - Jeff Portillo MD   Code Status: FULL CODE    Chief Complaint:    Shortness of breath     History Of Presenting Illness:     71F admitted to [address] [place] [address] 10/27/21 w/ SOB. Current smoker w/ PMH PAH grp 3, HFpEF, stage III sacral ulcer s/p wound ostomy . Patient sister reports SpO2 51% at home and was brought to ED where ABG showed pH 7.19, PCO2 80, PO2 134. She required emergent intubation and was transferred to ICU. Workup revealed left sided pleural effusion and underwent US guided thoracentesis w/ 1100 cc removed consistent w/ MRSA/adenovirus. Patient was noted to be in afib/RVR and was placed on amio, heparin drip. Patient was also noted to have normocytic anemia and received 1 unit PRBC 10/31/21. CT abdomen revealed CBD dilation w/ cholelithiasis.      2021: Plan for nephrostomy tubes today. Hemoglobin 7.7 s/p 1 unit PRBC yesterday. Weaned to 4 mics Levophed. 2021: Patient resumed back on prior dose of vancomycin. Low urine output w/ increasing pressor requirements today  2021: Will attempt SBT if able to wean today. Increased Amio drip 2/2 worsening tachycardia. Continued hematuria w/ low urine output. Cr stable. Will evaluate UTI following perc tube placement. Rise in WBC noted. Culture pending  2021: Cardioversion on 11/3/2021. Now and NSR. CVP 29 this a.m. Suspect drop in CI 2/2 to stopping levophed. Diffuse edema following transfusion overnight. Given one-time dose of 2 mg Bumex and albumin. SBT this AM  2021: Failed SBT yesterday. Continued low urine output. Trial of Bumex today. Will proceed with dialysis if fails to improve.   2021: Started on CRRT, continues to have bloody drainage of nephrostomy tube given 1 unit PRBC, cefipime emperically pending cultures of nephrostomy tube fluid and repeat resp cultures. 11/07/21: Patient remains clinically well on exam. We'll continue medical of UF with CRRT. On empiric cefepime for coverage of perinephric abscess noted on CT abdomen and pelvis November 4, 2021. White count trending down. Will attempt spontaneous breathing trial in a.m.  11/08/21: Transfused 1x PRBC this AM. Anemia workup pending. WBC trending down. Patient ventilator settings this AM preclude SBT. Volume overloaded on exam. UF increased to 125 cc/hr this AM. Will attempt to wean vent settings further further. Coag neg staph growing on nephrostomy tube aspirate. Suspect contamination. Continue existing ABx  11/09/21: Pulse dose steroids initiated overnight. Started on SSI. Urine output now improving 75cc/hr. Transfused 3 units PRBCs yesterday for Improve DO2. MERCEDES noted w/ 875 mg deficit. Will replace once steroids/ABx completed. 11/10/21: Extubated overnight. SLP eval today. Continue BiPAP while asleep w/ transition to NC while awake as tolerated. No tachypnea. Day 3 pulse dose steroids. Continued bloody drainage from nephrostomy tube. Holding [facility]   11/11/21: Worsening bilateral infiltrates with SpO2 <90% on HFNC. Continued pulmonary hygiene w/ nebulized hypertonic saline, acapella and breathing treatments in addition to deep suctioning. Continued on Vancomycin for MRSA PNA. Plan for family meeting this AM to discuss goals of care. 11/12/21: Follow-up discussion from family meeting. Patient nodded agreement with full code status including reintubation leading to tracheostomy and PEG placement and continued hemodialysis if needed. Patient nodded agreement and understanding with these decisions. CXR yesterday evening did show left mainstem mucus plugging with cutoff sign and absent breath sounds and did undergo emergent bronchoscopy overnight on 11/12/21.  Diminished breath sounds and worsening respiratory well.   11/26/21: Medial pneumothorax noted on Chest XR in the AM, subsequent CT chest confirmed 20% being the size. Patient was taken down to IR for chest tube placement which was successful. Attached to suction -20 mmHg. Patient also underwent hemodialysis today. Overall, still with a lot going on. Was told that patient is #2 in line for a bed at [facility] but given her ongoing acuity of care  11/27/21: See below  Subjective (Last 24 Hours):    Patient laying in bed comfortably this morning with no complaints or concerns. Medial pneumothorax improved on repeat chest XR, chest tube in place. AM labs and kidney function looking a lot better today s/p HD yesterday. Family at bedside today inquiring about when the staghorn calculi could be removed. Informed them likely once patient more stable and decision rests with urology. All other ROS negative.    Medications    Current Medications    metoprolol tartrate  12.5 mg Oral BID    sodium chloride flush  5-40 mL IntraVENous 2 times per day    metoclopramide  5 mg IntraVENous Q8H    sodium chloride (Inhalant)  4 mL Nebulization Q6H    And    albuterol  2.5 mg Nebulization Q6H    lactobacillus  1 capsule Per NG tube Daily with breakfast    piperacillin-tazobactam  3,375 mg IntraVENous Q12H    chlorhexidine  15 mL Mouth/Throat BID    insulin lispro  0-6 Units SubCUTAneous Q6H    allopurinol  100 mg Oral Daily    phosphorus replacement protocol   Other RX Placeholder    potassium bicarb-citric acid  40 mEq Oral Once    tiotropium-olodaterol  2 puff Inhalation Daily    [Held by provider] aspirin  81 mg Oral Daily    pantoprazole  40 mg IntraVENous QAM    sodium hypochlorite   Irrigation Daily    Riociguat  2.5 mg Oral Q8H    FLUoxetine  40 mg Oral Daily     sodium chloride flush, sodium chloride, LORazepam, lidocaine PF, ondansetron, albuterol, fentanNYL, glucose, dextrose, glucagon (rDNA), dextrose, polyethylene glycol, senna, acetaminophen  IV Drips/Infusions   sodium chloride      dextrose Stopped (11/25/21 0336)    [Held by provider] sodium chloride 50 mL/hr at 10/28/21 2314     Diet    ADULT TUBE FEEDING; PEG; Renal Formula; Continuous; 30; No; 240; Q 8 hours  Allergies    Patient has no known allergies. Vitals     height is 4' 9\" (1.448 m) and weight is 168 lb 3.2 oz (76.3 kg). Her oral temperature is 98.5 °F (36.9 °C). Her blood pressure is 120/62 and her pulse is 101. Her respiration is 23 and oxygen saturation is 100%. Body mass index is 36.4 kg/m². SUPPLEMENTAL O2: O2 Flow Rate (L/min): 16 L/min   Input/Output       Intake/Output Summary (Last 24 hours) at 11/27/2021 1049  Last data filed at 11/27/2021 1021  Gross per 24 hour   Intake 1648.94 ml   Output 2379 ml   Net -730.06 ml     I/O last 3 completed shifts: In: 1628.2 [I.V.:30; NG/GT:1045; IV Piggyback:153.2]  Out: 8373 [Urine:430; Chest Tube:49]   Patient Vitals for the past 96 hrs (Last 3 readings):   Weight   11/27/21 0415 168 lb 3.2 oz (76.3 kg)   11/26/21 1148 166 lb 7.2 oz (75.5 kg)   11/26/21 0750 169 lb 12.1 oz (77 kg)     Physical Exam   General: Acute on chronically ill-appearing elderly white female on trach ventilation   HEENT: Temporal wasting appreciated. Normocephalic and atraumatic. No scleral icterus. PERR  Neck: supple. No Thyromegaly. Left subclavian dialysis catheter   Lungs: Mild diffuse rhonchi appreciated bilaterally No retractions. Chest tube in place on right. Cardiac: RRR. No JVD. Abdomen: soft. Nontender,nephrostomy tube with bloody drainage  Extremities: +1 pitting edema x4. (interval improvement noted) No clubbing, cyanosis   Vasculature: capillary refill < 3 seconds. Palpable dorsalis pedis pulses. Skin: warm and dry. Psych: Alert and oriented to person place and time, affect appropriate  Lymph:  No supraclavicular adenopathy. Neurologic: No focal deficit. No seizures.   Labs   ABG  Lab Results   Component Value Date    PH 7.36 11/13/2021    PO2 59 11/13/2021    PCO2 45 11/13/2021    HCO3 26 11/13/2021    O2SAT 89 11/13/2021     Lab Results   Component Value Date    IFIO2 70 11/13/2021    MODE PC 11/05/2021    SETPEEP 8.0 11/15/2021     CBC  Recent Labs     11/25/21  0555 11/26/21  1340 11/27/21  0420   WBC 7.9 7.1 7.3   RBC 2.76* 2.79* 2.66*   HGB 8.3* 8.4* 7.9*   HCT 26.1* 26.0* 25.3*   MCV 94.6 93.2 95.1   MCH 30.1 30.1 29.7   MCHC 31.8* 32.3 31.2*    204 213   MPV 9.1* 9.1* 8.7*      BMP  Recent Labs     11/25/21  0555 11/26/21  0423 11/27/21  0420   * 129* 134*   K 4.0 4.0 3.9   CL 94* 93* 99   CO2 23 22* 26   BUN 41* 54* 30*   CREATININE 3.2* 3.5* 2.5*   GLUCOSE 101 106 113*   MG  --  1.8 1.7   CALCIUM 8.7 8.4* 8.4*     LFT  No results for input(s): AST, ALT, ALB, BILITOT, ALKPHOS, LIPASE in the last 72 hours. Invalid input(s): AMYLASE  TROP  Lab Results   Component Value Date    TROPONINT 0.084 10/28/2021    TROPONINT 0.059 10/27/2021    TROPONINT 0.037 03/06/2020     BNP  No results for input(s): BNP in the last 72 hours. Lactic Acid  No results for input(s): LACTA in the last 72 hours. INR  No results for input(s): INR, PROTIME in the last 72 hours. PTT  No results for input(s): APTT in the last 72 hours.   Glucose  Recent Labs     11/26/21  1649 11/27/21  0028 11/27/21  0601   POCGLU 107 117* 111*     Microbiology     Procedure Component Value Units Date/Time   Culture, Blood 2 [[id number]] Collected: 11/27/21 0748   Order Status: Sent Specimen: Blood Updated: 11/27/21 0828   Culture, Blood 1 [[id number]] Collected: 11/27/21 0755   Order Status: Sent Specimen: Blood Updated: 11/27/21 0827     Procedures   - Hemodialysis on 11/26/21     Problem List      Active Hospital Problems    Diagnosis Date Noted    Paroxysmal atrial fibrillation ([facility] [I48.0] 11/22/2021    Hemoptysis [R04.2]     Chronic respiratory failure with hypoxia (HCC) [J96.11]     Aspiration pneumonia of left lung ([facility] [J69.0]     Pleural effusion [J90]     Shock ([facility] [R57.9]     Flash pulmonary edema ([address] [J81.0]     Acute respiratory failure ([address] [J96.00] 10/27/2021    Pressure ulcer of right buttock, stage 3 ([address] [L89.313] 07/29/2021    Pulmonary HTN ([facility] [I27.20] 09/03/2019      Assessment & Plan:   1. Subacute combined hypercapnic and hypoxic respiratory failure: D/t to pneumonia with L-sided pleural effusion and repeated mucous plugging. Intubated 10/27/21. Extubated following successful SBT on 11/9/21. Failed HFNC and was reintubated on 11/15/21. Tracheostomy placed 11/17/21.     - Pulmonology following    - Wean as tolerated   - Lung protective strategies    - Aspiration precautions    2. Pneumothorax on Right - 20% in size:  Likely 2/2 PPV. Chest XR on 11/26 showed stable appearance of right pneumothorax of about 10% as measured on the AP radiograph. Subsequent CT chest showed multiloculated pneumothorax right side approximately 20% in size. - Chest tube was place by IR on the right on 11/26   - Repeat chest XR 11/27 reveals improvement in pneumothorax   3. Stage III ALVIN on CKD 3: Suspect post renal etiology 2/2 staghorn calculi. Nephrology following. CRRT 11/5/21 until 11/12/21. Anti-DS-DNA negative, Anti histone negative, Anti Smith,and  Anti-Savana WNL, GBM antibodies negative, C3/C4 levels normal, elevated kappa/lambda free light chains with normal ratio. HD started 11/20/21. Positive for ANCA associated vasculitis. ANCA associated Abs show elevated anti MPO elevated 416 and serine proteinase 3 IgG WNL   - Plans for biopsy per nephrology when stable   - CVVH stopped 11/15/21 significant oliguria since that time   - Hemodialysis on 11/26 done with good response   4. Dysphagia: 2/2 prolonged intubation. PEG tube placed on 11/24/21. Protonix daily   5. Hemoptysis: (resolved): Bronchoscopy on 11/22 remarkable for old blood stained mucus in RLL. No active bleed. - Pulm following, appreciate recs     - Jose Alfredo 300 mg by nebulization twice daily  6.  Acute blood loss anemia with chronic normocytic anemia: 2/2 hematuria s/p perc tube placement w/ Iron studies consistent w/ MERCEDES vs Anemia chronic disease. B12 WNL, Folate low, Abs reticulocyte index 11/18/21 0.49% Soluable transferrin 139. Calculated Iron deficit 827mg. PRBC x1 transfused 10/31/21, 11/4/21, 11/5/21, x2 11/6/21, x3 11/8/21, x3 11/15/21.    - Heparin stopped (dialysis dose still running)   - Continue holding ASA    - Venofer, replace folate once ABx stopped  7. 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 Probes to bone, necrosis noted around wound site. CT abdomen and pelvis showing possible mi involvement with concern for osteomyelitis. Seen by general surgery on 11/17/21. No indications for debridement at the time. Poor candidate for surgery given multiple co-morbidities, malnutrition and poor wound healing. Wound cultures from 11/19 growing proteus mirabilis and pseudomonas aeruginosa. - Wound care following, continue Dakins w/ dressing changes   - Rectal tube in place, continue     - Remains on IV Zosyn, now day 10  8. Hypokalemia: Resolved. 3.4 on 11/24. Potassium replacement protocol in place. 9. Hyponatremia: 124 on 11/23 in setting of ALVIN stage III on CKD. Will get HD on 11/23/21. Monitor with daily BMP   10. Hematuria: s/p percutaneous tube placement. CBI per above. Stopped heparin, holding ASA. 11. ANCA associated Vasculitis: workup per above. Will need Renal Bx for confirmation once stable. Discussed case w/ Nephrology. Completed pulse dose steroids starting on 11/8/21 w/ 10mg/kg methylprednisolone for 3 days. SSI started for coverage. Not candidate for Rituxin/TPE 2/2 existing infection. 12. Bilateral Pleural Effusion:   Associated volume overload in context of renal failure. Interval enlargement noted on CT 11/1/21. Currently on CVVH for effusions. Plan for chest tube insertion if no improvement  13. Hydronephrosis: 2/2 large staghorn calculi. S/p nephrostomy tube placement on left still some bloodstained discharge  14. Suspected COPD: current every day smoker. Obstructive process noted on flow volume loop on ventilator. Started empiric therapy with Stiolto. Recommend formal PFT once improved  15. PAH/chronic RV failure NYHA II: EF 55 to 60% G2 DD TTE 5/4/2021. RHC showed [place] 30 with elevated PCWP. Treated w/ Riociguat. 16. Chronic tobacco use:  cessation  17. Gout: Continue allopurinol  18. BARBER: non compliant w/ CPAP   19. Septic Shock: Resolved. 2/2 severe PNA now concern for necrotizing wound on sacrum. Initial CI 6.3 w/ NE precludes concern for septic shock NE d/c 2/2 worsening afib RVR  20. Thrombocytopenia: Resolved. Suspect consumptive process in context of sepsis. Not on heparin    21. Severe bilateral multiorganism pneumonia: Resolved. PCR positive Staph w/ MECA gene and adenovirus consistent w/ MRSA colonization. Staph completed 14 days Vancomycin (started 10/28/21) and completed 5 days Rocephin. Clindamycin started for coverage of PVL as patient clinically worsening following 14 days Vancomycin for MRSA coverage. Underwent bronchoscopy for left mainstem mucus plug noted CXR on 11/11/21. Switched to Clindamycin for coverage of PVL received 8 days therapy (started 11/11/21). PNA panel from 11/15/21 negative. Repeat cultures show no growth    PT/OT Eval Status: Daily PT    VTE prophylaxis: [] Lovenox                                 [x] SCDs                                 [] SQ Heparin                                 [] Encourage ambulation           [] Already on Anticoagulation     IV Fluids: None   Diet:  PEG tube   Code Status: Full Code  Disposition: [facility] once bed available & stable.  Guarded long term prognosis     Tele:   [x] yes             [] no    Electronically signed by   Ladi Conrad DO on 11/27/2021 at 10:49 AM

## 2021-11-27 NOTE — PROGRESS NOTES
Comprehensive Nutrition Assessment    Type and Reason for Visit:  Reassess, Consult (tube feeding ordering and management)    Nutrition Recommendations/Plan:   Recommend continue Nepro carb steady at 30 ml/hr (goal). Flush 2.5 oz. Proteinex daily. If no IVF - flush 240 ml free water TID (ok'd by Dr Bonny Hall on 11/22/21)  Will monitor TF tolerance, labs, weights vs. Need to adjust TF regimen, free water flushes. Nutrition Assessment:    Pt. Improving from a nutritional standpoint AEB tolerating TF at [address] risk for further nutrition compromise r/t admitted with acute respiratory failure, intubated 10/27, extubated 11/9, reintubated 11/15, AFib, ALVIN, CRRT & HD this admit, large worsening sacral wound, stage IV,  11/1 nephrosotmy tube placement, anemia, concern for cholecystitis, TF intolerances this admit, PEG tube placement 11/24/21, pneumothorax & chest tube insertion 11/26 and underlying medical condition (CKD, s/p ID of buttock wound 8/6/21,CHF, gout, pulmonary HTN, obesity).  Nutrition recommendations/interventions as per above    Malnutrition Assessment:  Malnutrition Status: At risk for malnutrition (Comment)    Context:  Acute Illness     Findings of the 6 clinical characteristics of malnutrition:  Energy Intake:  1 - 75% or less of estimated energy requirements for 7 or more days  Weight Loss:   (5.8% weight loss in 3.5 months)     Body Fat Loss:  No significant body fat loss     Muscle Mass Loss:  Unable to assess    Fluid Accumulation:  1 - Mild     Strength:  Not Performed    Estimated Daily Nutrient Needs:  Energy (kcal):  7847-9984 kcals (15-18); Weight Used for Energy Requirements:  Admission (75.1 kg)     Protein (g):  63-84 gm (1.5-2) as renal status allows (wounds/HD);  Weight Used for Protein Requirements:  Ideal        Fluid (ml/day):  ~1500 ml (HD); Method Used for Fluid Requirements:  Other (Comment)      Nutrition Related Findings:    Patient intubated 10/27, extubated 11/9, failed swallow evaluation per Speech Therapy 11/10 , reintubated 11/15; +trach; RN reports tolerating tube feeding at goal, some output from flexiseal, Hemodialysis on 11/26, medication includes humalog, culturelle, reglan, zosyn; Sodium 134, Potasium 3.9, BUN 30, Creatinine 2.5, Glucose 111    Wounds:  Stage IV (large worsening sacral wound, stage IV, with area of necrosis)       Current Nutrition Therapies:    ADULT TUBE FEEDING; PEG; Renal Formula; Continuous; 30; No; 240; Q 8 hours  Current Tube Feeding (TF) Orders:  · Feeding Route: PEG (placement 11/24/21)  · Formula: Renal Formula (Nepro)  · Schedule: Continuous (goal 30 ml/hr)  · Additives/Modulars:  (flush 1 ( 2.5 oz) liquid protein daily)  · Water Flushes: 240 ml TID if no IVF  · Current TF & Flush Orders Provides: at goal  · Goal TF & Flush Orders Provides: Nepro carb steady at 30 ml/hr with 2.5 oz.  Proteinex daily provides pt. with 1378 kcals (1274 TF, 104 modular), 84 gm protein (58 TF, 26 modular), 115 gm CHO, 18 gm fiber, 1243 ml free water (523 TF, 720 flushes) in 1515 ml volume (720 TF, 75 modular, 720 flushes)      Anthropometric Measures:  · Height: 4' 9\" (144.8 cm)  · Current Body Weight: 168 lb 3.2 oz (76.3 kg) (11/27; +1 pitting edema)   · Admission Body Weight: 165 lb 9.1 oz (75.1 kg) (10/27 +1 edema)    · Usual Body Weight: 175 lb 11.3 oz (79.7 kg) (per EMR 7/16/21)     · Ideal Body Weight: 85 lbs;   · BMI: 36.4  · Adjusted Body Weight:  ;  (IBW ~93 #)   · BMI Categories: Obese Class 2 (BMI 35.0 -39.9) (on admit)       Nutrition Diagnosis:   · Inadequate oral intake related to swallowing difficulty as evidenced by NPO or clear liquid status due to medical condition, nutrition support - enteral nutrition      Nutrition Interventions:   Food and/or Nutrient Delivery:  Continue Current Tube Feeding  Nutrition Education/Counseling:  Education initiated (11/22 Discussed TF monitoring w/ pt's family)   Coordination of Nutrition Care:  Continue to monitor while inpatient    Goals:  Tube feeding to provide 75% or more of estimated nutrient needs until able to transition to oral diet during LOS       Nutrition Monitoring and Evaluation:   Behavioral-Environmental Outcomes:  None Identified   Food/Nutrient Intake Outcomes:  Enteral Nutrition Intake/Tolerance  Physical Signs/Symptoms Outcomes:  Biochemical Data, Chewing or Swallowing, GI Status, Fluid Status or Edema, Nutrition Focused Physical Findings, Skin, Weight     Discharge Planning:     Too soon to determine     Electronically signed by Sera Tong RD, LD on 11/27/21 at 10:16 AM EST    Contact: [phone]

## 2021-11-27 NOTE — FLOWSHEET NOTE
Pt is a 72y.o. female in 2K-16. Rama Baxter was sleeping comfortably;  prayed for her in the doorway of her room.      11/26/21 2045   Encounter Summary   Services provided to: Patient   Referral/Consult From: [facility] Family members   Continue Visiting Yes  (11/26)   Complexity of Encounter Low   Length of Encounter 15 minutes   Routine   Type Follow up   Intervention Prayer

## 2021-11-27 NOTE — FLOWSHEET NOTE
11/27/21 1238   Provider Notification   Reason for Communication Patient request  (Sister of patient request)   Provider Name Dr Adam Redmond  (urology)   Provider Notification Physician   Method of Communication Secure Message   Response See orders   Notification Time 813 1026   message from  Dr Adam Redmond said \" plan to reassess once patient in [address]

## 2021-11-27 NOTE — PROGRESS NOTES
[facility]      Patient - Tabatha Garza   MRN -  [id number]   [facility]   - 1950      Date of Admission -  10/27/2021  1:36 PM  Date of evaluation -  2021  Room - -12/012-A   [facility] Primary Care Physician - Sherrel Frankel, MD     Problem List      Active Hospital Problems    Diagnosis Date Noted    Paroxysmal atrial fibrillation ([address] [I48.0] 2021    Hemoptysis [R04.2]     Chronic respiratory failure with hypoxia (HCC) [J96.11]     Aspiration pneumonia of left lung ([address] [J69.0]     Pleural effusion [J90]     Shock ([address] [R57.9]     Flash pulmonary edema ([address] [J81.0]     Acute respiratory failure ([address] [J96.00] 10/27/2021    Pressure ulcer of right buttock, stage 3 ([address] [L89.313] 2021    Pulmonary HTN ([address] [I27.20] 2019     Reason for Consult    Vent management, Hemoptysis  HPI   History Obtained From: Patient's niece at bedside and electronic medical record. Tabtaha Garza is a 73 y.o. female never smoker with PMHx chronic diastolic CHF (EF 76-51%, G2OZ per TTE 21) / NYHA II / chronic RV failure / severe PAH (PA 85/31 - mean 52 / PVR 10), mild AS (valve area 1.7 sq cm), morbid obesity, HTN and depression -- presents to [facility] with a chief complaint of shortness of breath. History obtained from the EMR (patient intubated / sedated on the ventilator).     Patient presented to [facility] ED after her sister noticed the patient was experiencing worsening shortness of breath throughout the day / recorded SpO2 51% on home pulse oximeter prior to arrival. The patient has a known history of CHF and PAH, for which she follows with Dr. Zarina Sotelo (cardiology) and [facility] [address] / currently on adempas. Per report, she was recently discontinued from oral bumex, which she stopped taking just this morning.  She endorsed mild headache on arrival (poorly characterized), but no associated fevers/chills, chest pain, palpitations, cough, n/v/d, abdominal pain, urinary symptoms, weakness or syncope were reported. Limited additional history available. Per patient's niece the patient was sedentary at home before admission to the hospital.     Patient was intubated on 10/27/21. Extubated following successful SBT on 11/9/21. Status post bronchoscopy with mucous plug removal from the left mainstem on 11/13. Failed HFNC and was reintubated on 11/15/21. Tracheostomy tube was placed on 11/17/21. She is having shortness of breath: No  Functional status prior to beginning of symptoms: 0 block/s on level ground. Current functional capacity on level ground: 0 block/s on level ground. She can climb steps: No  Flights of steps she can climb: 0    She is having cough: Yes  Duration of cough: for 30+ days. Her cough is associated with sputum production: Yes   The sputum color: white  Hemoptysis:Yes    She is having chest pain:No      Past 24 hrs   -Was on PSV/CPAP 12/6 then switched to T-piece and she is tolerating that.  -FiO2 30%  -Still very weak. -No fever or chills. -S/p right-sided chest tube, did not notice air leak while I was there.   All other systems reviewed    PMHx   Past Medical History      Diagnosis Date    CHF (congestive heart failure) ([facility])     Dr. Chelsi Rodriguez Depression     Gout attack     Hypertension     Osteoarthritis     Pulmonary artery hypertension [facility]     [facility]-- Dr. Sherice Cid-- Dr. Chelsi Rodriguez Renal calculi     Dr. Moni Thompson [facility])       Past Surgical History        Procedure Laterality Date    APPENDECTOMY  1960    BRONCHOSCOPY N/A 11/22/2021    BRONCHOSCOPY performed by Robson Johnson MD at [address] [facility]    IR [address]  11/26/2021    IR CHEST TUBE INSERTION 11/26/2021 [facility] SPECIAL PROCEDURES    IR NEPHROSTOMY PERCUTANEOUS LEFT  11/1/2021    IR NEPHROSTOMY PERCUTANEOUS LEFT 11/1/2021 Disha Hector MD [facility] PROCED[facility]    PRESSURE ULCER DEBRIDEMENT Right 8/6/2021    EXCISION RIGHT BUTTOCK WOUND AND CLOSURE performed by Gilbert Botello MD at [address] [facility]    Current Medications    metoprolol tartrate  12.5 mg Oral BID    sodium chloride flush  5-40 mL IntraVENous 2 times per day    metoclopramide  5 mg IntraVENous Q8H    sodium chloride (Inhalant)  4 mL Nebulization Q6H    And    albuterol  2.5 mg Nebulization Q6H    lactobacillus  1 capsule Per NG tube Daily with breakfast    piperacillin-tazobactam  3,375 mg IntraVENous Q12H    chlorhexidine  15 mL Mouth/Throat BID    insulin lispro  0-6 Units SubCUTAneous Q6H    allopurinol  100 mg Oral Daily    phosphorus replacement protocol   Other RX Placeholder    potassium bicarb-citric acid  40 mEq Oral Once    tiotropium-olodaterol  2 puff Inhalation Daily    [Held by provider] aspirin  81 mg Oral Daily    pantoprazole  40 mg IntraVENous QAM    sodium hypochlorite   Irrigation Daily    Riociguat  2.5 mg Oral Q8H    FLUoxetine  40 mg Oral Daily     sodium chloride flush, sodium chloride, LORazepam, lidocaine PF, ondansetron, albuterol, fentanNYL, glucose, dextrose, glucagon (rDNA), dextrose, polyethylene glycol, senna, acetaminophen  IV Drips/Infusions   sodium chloride      dextrose Stopped (11/25/21 0336)    [Held by provider] sodium chloride 50 mL/hr at 10/28/21 4063     Home Medications  Medications Prior to Admission: metoprolol tartrate (LOPRESSOR) 25 MG tablet, Take 0.5 tablets by mouth 2 times daily  sodium hypochlorite (DAKINS) 0.125 % SOLN external solution, Apply Dakin's moistened gauze dressings to wound twice daily and as needed.   sodium chloride 1 g tablet, Take 1 tablet by mouth 2 times daily  allopurinol (ZYLOPRIM) 300 MG tablet, Take 1 tablet by mouth daily  FLUoxetine (PROZAC) 40 MG capsule, Take 1 capsule by mouth daily  potassium chloride (KLOR-CON M) 10 MEQ extended release tablet, Take 1 tablet by mouth every other day  bumetanide (BUMEX) 1 MG tablet, Take 1 tablet by mouth daily  Multiple Vitamins-Minerals (MULTIVITAMIN WOMEN PO), Take 1 tablet by mouth daily  acetaminophen (TYLENOL) 650 MG extended release tablet, Take 650 mg by mouth every 8 hours as needed for Pain  Riociguat (ADEMPAS) 2.5 MG TABS, Take 2.5 mg by mouth 3 times daily  docusate sodium (COLACE) 100 MG capsule, Take 100 mg by mouth as needed   aspirin 81 MG tablet, Take 81 mg by mouth daily   Diet    ADULT TUBE FEEDING; PEG; Renal Formula; Continuous; 30; No; 240; Q 8 hours  Allergies    Patient has no known allergies. Social History     Social History     Socioeconomic History    Marital status: Single     Spouse name: Not on file    Number of children: 0    Years of education: Not on file    Highest education level: Not on file   Occupational History    Not on file   Tobacco Use    Smoking status: Never Smoker    Smokeless tobacco: Never Used   Vaping Use    Vaping Use: Never used   Substance and Sexual Activity    Alcohol use: No    Drug use: No    Sexual activity: Not Currently   Other Topics Concern    Not on file   Social History Narrative    Not on file     Social Determinants of Health     Financial Resource Strain: Low Risk     Difficulty of Paying Living Expenses: Not very hard   Food Insecurity: No Food Insecurity    Worried About Running Out of Food in the Last Year: Never true    Traci of Food in the Last Year: Never true   Transportation Needs:     Lack of Transportation (Medical): Not on file    Lack of Transportation (Non-Medical):  Not on file   Physical Activity:     Days of Exercise per Week: Not on file    Minutes of Exercise per Session: Not on file   Stress:     Feeling of Stress : Not on file   Social Connections:     Frequency of Communication with Friends and Family: Not on file    Frequency of Social Gatherings with Friends and Family: Not on file    Attends Congregational Services: Not on file    Active Member of Clubs or Organizations: Not on file    Attends Club or Organization Meetings: Not on file    Marital Status: Not on file   Intimate Partner Violence:     Fear of Current or Ex-Partner: Not on file    Emotionally Abused: Not on file    Physically Abused: Not on file    Sexually Abused: Not on file   Housing Stability:     Unable to Pay for Housing in the Last Year: Not on file    Number of Places Lived in the Last Year: Not on file    Unstable Housing in the Last Year: Not on file     Family History          Problem Relation Age of Onset    Diabetes Father    [facility] Ar[facility]ritis Mother     COPD Mother     Diabetes Sister     Heart Disease Maternal Uncle     Breast Cancer Niece 36    Sleep Apnea Brother     Asthma Neg Hx     Birth Defects Neg Hx     Cancer Neg Hx     Depression Neg Hx     Early Death Neg Hx     Hearing Loss Neg Hx     High Blood Pressure Neg Hx     High Cholesterol Neg Hx     Kidney Disease Neg Hx     Learning Disabilities Neg Hx     Mental Illness Neg Hx     Mental Retardation Neg Hx     Miscarriages / Stillbirths Neg Hx     Stroke Neg Hx     Substance Abuse Neg Hx     Vision Loss Neg Hx     Other Neg Hx      Sleep History    Never diagnosed with sleep apnea in the past.  Occupational history   Occupation:  She is current working: No  Type of profession: unemployed, disabled.                      History of tobacco smoking:No     History of recreational or IV drug use in the past:NO     History of exposure to coal mines/coal dust: NO  History of exposure to foundry dust/welding: NO  History of exposure to quarry/silica/sandblasting: NO  History of exposure to asbestos/working with breaks/ships: NO  History of exposure to farm dust: NO  History of recent travel to long distances: NO  History of exposure to birds, pigeons, or chickens in the past:NO    History of pulmonary embolism in the past: No            History of DVT in the past:No              Vitals     height is 4' 9\" (1.448 m) and weight is 168 lb 3.2 oz (76.3 kg). Her oral temperature is 98.1 °F (36.7 °C). Her blood pressure is 119/56 (abnormal) and her pulse is 96. Her respiration is 22 and oxygen saturation is 98%. Body mass index is 36.4 kg/m². SUPPLEMENTAL O2: O2 Flow Rate (L/min): 16 L/min     I/O        Intake/Output Summary (Last 24 hours) at 11/27/2021 1607  Last data filed at 11/27/2021 1449  Gross per 24 hour   Intake 1323.33 ml   Output 540 ml   Net 783.33 ml     I/O last 3 completed shifts: In: 1335.3 [NG/GT:1225; IV Piggyback:110.3]  Out: 540 [Urine:380; Stool:100; Chest Tube:60]   Patient Vitals for the past 96 hrs (Last 3 readings):   Weight   11/27/21 0415 168 lb 3.2 oz (76.3 kg)   11/26/21 1148 166 lb 7.2 oz (75.5 kg)   11/26/21 0750 169 lb 12.1 oz (77 kg)       Exam   Physical Exam   Constitutional: No distress on vent via trach. Patient appears moderately built. Head: Normocephalic and atraumatic. Mouth/Throat: Oropharynx is clear and moist.  No oral thrush. Eyes: Conjunctivae are normal. Pupils are equal, round. No scleral icterus. Neck: Neck supple. 7.5 mm Bivona portex in place  Cardiovascular: S1 and S2 with no murmur. No peripheral edema  Pulmonary/Chest: Normal effort with bilateral air entry, clear breath sounds. No stridor. No respiratory distress. Patient exhibits no tenderness. Abdominal: Soft. Bowel sounds audible. No distension or tenderness to palp.    Musculoskeletal: Moves all extremities  Neurological: Patient is alert and follows simple commands      Labs  - Old records and notes have been reviewed in [facility]   ABG  Lab Results   Component Value Date    PH 7.36 11/13/2021    PO2 59 11/13/2021    PCO2 45 11/13/2021    HCO3 26 11/13/2021    O2SAT 89 11/13/2021     Lab Results   Component Value Date    IFIO2 70 11/13/2021    MODE PC 11/05/2021    SETPEEP 8.0 11/15/2021     CBC  Recent Labs     11/25/21  0555 11/26/21  1340 11/27/21  0420   WBC 7.9 7.1 7.3   RBC 2.76* 2.79* 2.66* HGB 8.3* 8.4* 7.9*   HCT 26.1* 26.0* 25.3*   MCV 94.6 93.2 95.1   MCH 30.1 30.1 29.7   MCHC 31.8* 32.3 31.2*    204 213   MPV 9.1* 9.1* 8.7*      BMP  Recent Labs     11/25/21  0555 11/26/21  0423 11/27/21  0420   * 129* 134*   K 4.0 4.0 3.9   CL 94* 93* 99   CO2 23 22* 26   BUN 41* 54* 30*   CREATININE 3.2* 3.5* 2.5*   GLUCOSE 101 106 113*   MG  --  1.8 1.7   CALCIUM 8.7 8.4* 8.4*     LFT  No results for input(s): AST, ALT, ALB, BILITOT, ALKPHOS, LIPASE in the last 72 hours. Invalid input(s): AMYLASE  TROP  Lab Results   Component Value Date    TROPONINT 0.084 10/28/2021    TROPONINT 0.059 10/27/2021    TROPONINT 0.037 03/06/2020     BNP  No results for input(s): BNP in the last 72 hours. Lactic Acid  No results for input(s): LACTA in the last 72 hours. INR  No results for input(s): INR, PROTIME in the last 72 hours. PTT  No results for input(s): APTT in the last 72 hours. Glucose  Recent Labs     11/27/21  0028 11/27/21  0601 11/27/21  1227   POCGLU 117* 111* 128*     UA No results for input(s): SPECGRAV, PHUR, COLORU, CLARITYU, MUCUS, PROTEINU, BLOODU, RBCUA, WBCUA, BACTERIA, NITRU, GLUCOSEU, BILIRUBINUR, UROBILINOGEN, KETUA, LABCAST, LABCASTTY, AMORPHOS in the last 72 hours. Invalid input(s): CRYSTALS. PFTs           Sleep studies   Study Results  Initial Study Date -  11/7/19  AHI -  5.8   TotalEvents - 32  (Apneas  19  Hypopneas 13  Central  0)  LM w/Arousals - 0  Sleep Efficiency - 70.8 % (Total Sleep Time - 330 min)  Time with Sats below 88% - 0 min    Cultures    -Anaerobic and aerobic culture: (From Coccyx) Positive for Proteus mirabilis and Pseudomonas aeruginosa, culture also yielded very light growth of Staphylococcus species (coagulase negative), and additional enteric gram negative bacilli  -Respiratory culture on 11/11/21 of bronchial washing:No bacteria seen.    -Molecular pneumonia panel of sputum on 11/15/2021: Negative  -AFB culture with smear: negative  -pneumonia panel of bronchial washings 11/13/2021: Positive for staph aureus and resistant gene meca/c & mrej  -Body fluid culture on 11/5/2021: positive for Staph epidermidis, very light growth Isolates of Methicillin Resistant Staphylococcus coagulase negative (MRSE)  -Respiratory culture on 11/5/2021: Positive for staphylococcal aureus,  EKG     Echocardiogram   Complete 2D ECHO with doppler with color 10/27/21:  Mitral Valve   The mitral valve structure was normal with normal leaflet separation. DOPPLER: The transmitral velocity was within the normal range with no   evidence for mitral stenosis. There was no evidence of mitral   regurgitation. Aortic Valve   The aortic valve was trileaflet with normal thickness and cuspal   separation. DOPPLER: Transaortic velocity was within the normal range with   no evidence of aortic stenosis. There was no evidence of aortic   regurgitation. Tricuspid Valve   The tricuspid valve structure was normal with normal leaflet separation. DOPPLER: There was no evidence of tricuspid stenosis. There was trace   tricuspid regurgitation. Pulmonic Valve   The pulmonic valve leaflets exhibited normal thickness, no calcification,   and normal cuspal separation. DOPPLER: The transpulmonic velocity was   within the normal range with no evidence for regurgitation. Left Atrium   Left atrial size was normal.      Left Ventricle   Normal left ventricular size and systolic function. There were no regional wall motion abnormalities. Wall thickness was within normal limits. Ejection fraction was estimated at 60-65%. Right Atrium   Right atrial size was normal.      Right Ventricle   The right ventricular size was normal with normal systolic function and   wall thickness. Pericardial Effusion   The pericardium was normal in appearance with a small, non-hemodynamically   significant pericardial effusion. Prominent pericardial fat pad.       Pleural Effusion   No evidence of pleural effusion. Aorta / Great Vessels   IVC size is dilated with reduced respiratory phasic changes (CVP~10-15   mmHg). Radiology    CXR  PORT CXR 11/19/21:  There is a Dobbhoff tube which projects over the stomach. There is a left-sided percutaneous nephrostomy tube, stable compared to prior CT. 1. Mild hepatomegaly. Left subclavian dialysis catheter with tip at cavoatrial junction. NG tube passes into stomach. Tracheostomy tube in good position. Right jugular line with catheter tip in right atrium. 2. Tiny bilateral pleural effusions. Moderate pneumonia/pulmonary edema scattered relatively diffusely in both lungs. 3. Overall appearance of chest has worsened somewhat since prior.       CT Scans  (See actual reports for details)    CT chest without contrast on 11/1/21: There are moderate bilateral pleural effusions which have mildly increased in size in the interval. There is adjacent atelectasis. Pulmonary vessels are prominent, similar to prior exam. There are groundglass opacities adjacent to the pulmonary vessels. The heart is enlarged, similar to prior exam. There is a small pericardial effusion, decreased compared to prior exam. Redemonstration of percutaneous cardiac pacer leads and enteric tube. The endotracheal tube is stable with tip in the distal trachea. There is marked thyromegaly which is nodular in appearance, stable compared to prior exam. Visualized upper abdominal solid organs are grossly unremarkable. There are stable degenerative changes of the thoracic spine with exaggerated thoracic kyphosis. Assessment   -Hemoptysis in the setting of tracheostomy tube--resolved  -Acute hypoxic respiratory failure secondary to severe multi-organism pneumonia with bilateral pleural effusion and repeated mucous plugging   Intubated 10/27/21, extubated 11/9/21, reintubated 11/15/21, tracheostomy tube placed 11/17/21  -Right-sided pneumothorax.   Status post chest tube 11/26/2021  -S/p bronchoscopy with mucous plug removal from the left mainstem on 11/13/21 by Dr. Navi Alfredo in ICU  -Bilateral pleural effusions   -Severe bilateral multiorganism pneumonia: received 14 days of vancomycin, 5 days of rocephin, clinidamycin for 8 days   ANCA associated vasculitis: elevated anti MPO elevated 416 and serine proteinase 3 IgG WNL  -Stage II ALVIN on CKD 3 on HD started 11/20/21  -Hydronephrosis 2/2 Left-sided staghorn calculi  -Acute blood loss anemia: PRBC x1 transfused 10/31/21, 11/4/21, 11/5/21, x2 11/6/21, x3 11/8/21, x3 11/15/21. Heparin stopped (dialysis dose last ran on 11/11/21). -Thrombocytopenia   -Hematuria   -BARBER noncompliant with CPAP     Plan   -The patient tolerated T-piece and she is off the vent. - Place the patient back on the vent when she feels tired or get short of breath. -Keep chest tube to suction. -Aspiration precautions   -Cautious use of tracheal suction to reduce risk of suction trauma   -Wean FiO2 as tolerated to maintain saturation above 90%. Questions and concerns addressed.     Electronically signed by   Kateryna Ontiveros MD on 11/27/2021 at 4:07 PM

## 2021-11-27 NOTE — PROGRESS NOTES
HOSPITALIST PROGRESS NOTE    Patient - Cee Dueñas   MRN -  [id number]   Thomas # - [de-identified]   - 1950      Date of Admission -  10/27/2021  1:36 PM  Date of evaluation -  2021  Room - [address] [place] [address]-A   Hospital Day - 30  Primary Care Physician - Aleshia Trevino MD   Code Status: FULL CODE    Chief Complaint:    Shortness of breath     History Of Presenting Illness:     71F admitted to [address] [place] [address] 10/27/21 w/ SOB. Current smoker w/ PMH PAH grp 3, HFpEF, stage III sacral ulcer s/p wound ostomy . Patient sister reports SpO2 51% at home and was brought to ED where ABG showed pH 7.19, PCO2 80, PO2 134. She required emergent intubation and was transferred to ICU. Workup revealed left sided pleural effusion and underwent US guided thoracentesis w/ 1100 cc removed consistent w/ MRSA/adenovirus. Patient was noted to be in afib/RVR and was placed on amio, heparin drip. Patient was also noted to have normocytic anemia and received 1 unit PRBC 10/31/21. CT abdomen revealed CBD dilation w/ cholelithiasis.      2021: Plan for nephrostomy tubes today. Hemoglobin 7.7 s/p 1 unit PRBC yesterday. Weaned to 4 mics Levophed. 2021: Patient resumed back on prior dose of vancomycin. Low urine output w/ increasing pressor requirements today  2021: Will attempt SBT if able to wean today. Increased Amio drip 2/2 worsening tachycardia. Continued hematuria w/ low urine output. Cr stable. Will evaluate UTI following perc tube placement. Rise in WBC noted. Culture pending  2021: Cardioversion on 11/3/2021. Now and NSR. CVP 29 this a.m. Suspect drop in CI 2/2 to stopping levophed. Diffuse edema following transfusion overnight. Given one-time dose of 2 mg Bumex and albumin. SBT this AM  2021: Failed SBT yesterday. Continued low urine output. Trial of Bumex today. Will proceed with dialysis if fails to improve.   2021: Started on CRRT, continues to have bloody drainage of nephrostomy tube given 1 unit PRBC, cefipime emperically pending cultures of nephrostomy tube fluid and repeat resp cultures. 11/07/21: Patient remains clinically well on exam. We'll continue medical of UF with CRRT. On empiric cefepime for coverage of perinephric abscess noted on CT abdomen and pelvis November 4, 2021. White count trending down. Will attempt spontaneous breathing trial in a.m.  11/08/21: Transfused 1x PRBC this AM. Anemia workup pending. WBC trending down. Patient ventilator settings this AM preclude SBT. Volume overloaded on exam. UF increased to 125 cc/hr this AM. Will attempt to wean vent settings further further. Coag neg staph growing on nephrostomy tube aspirate. Suspect contamination. Continue existing ABx  11/09/21: Pulse dose steroids initiated overnight. Started on SSI. Urine output now improving 75cc/hr. Transfused 3 units PRBCs yesterday for Improve DO2. MERCEDES noted w/ 875 mg deficit. Will replace once steroids/ABx completed. 11/10/21: Extubated overnight. SLP eval today. Continue BiPAP while asleep w/ transition to NC while awake as tolerated. No tachypnea. Day 3 pulse dose steroids. Continued bloody drainage from nephrostomy tube. Holding [facility]   11/11/21: Worsening bilateral infiltrates with SpO2 <90% on HFNC. Continued pulmonary hygiene w/ nebulized hypertonic saline, acapella and breathing treatments in addition to deep suctioning. Continued on Vancomycin for MRSA PNA. Plan for family meeting this AM to discuss goals of care. 11/12/21: Follow-up discussion from family meeting. Patient nodded agreement with full code status including reintubation leading to tracheostomy and PEG placement and continued hemodialysis if needed. Patient nodded agreement and understanding with these decisions. CXR yesterday evening did show left mainstem mucus plugging with cutoff sign and absent breath sounds and did undergo emergent bronchoscopy overnight on 11/12/21.  Diminished breath sounds and worsening respiratory status this AM concerning for continued plugging. Stat chest X-ray ordered for confirmation. 11/13/21: Respiratory status continues to decline, she is requiring BiPAP with FiO2 of 70%. Chest x-ray shows complete opacification of the left lung secondary to mucous plugging, also the patient has left-sided pleural effusion. Plan for bronchoscopy. 11/15/2021: Family meeting today per event note. Reintubated today after failed BiPAP  11/16/2021: will dc abx tomorrow. PNA panel negative. Plan for tracheostomy time permitting today. Weaning phenylephrine as tolerated. 11/17/2021: Necrotic Stage IV pressure ulcer probing to bone noted this AM. CT abdomen and pelvis pending to evaluate for abscess. Will discuss with family today when available. 11/18/2021: CT showed bony involvement of sacral ulcer w/ concern for osteomyelitis. Wound ostomy following. Culture pending. Started on broad spectrum therapy with Vancomycin and Zosyn for coverage of suspect osteomyelitis. Weaning sedation as tolerated  11/19/21: Wound culture pending. On broad spectrum ABx for coverage of osteomyelitis. Will de-escalate pending culture/sensitiivity. Consult to GI for PEG tube evaluation. 11/21/21: Tolerated conventional HD on 11/20/21 w/ 2500cc removed. Wound culture growing gram negative bacilli. Vanc discontinued. Weaned off phenylephrine. Will monitor overnight. Okay to transfer to step down in AM  11/22/21: FiO2 30%, rate of 8 L/min. Remains on tube feeds on day 5 Zosyn. Bronchoscopy for hemoptysis unremarkable findings of old bloody residual likely from trach procedure. 11/23/21: Remains on tube feeds on day 6 Zosyn. Creatinine bumped up to 3.5 today with Na low at 124. Nephrology performed HD today with tunneled cath next. GI planning for PEG tube placement tomorrow. 11/24/21: 7-day Zosyn completed. PEG tube placed by GI.  11/25/21: Labs looking a little worse, will be due for another hemodialysis session tomorrow.  Otherwise well.   11/26/21: See below  Subjective (Last 24 Hours):    Patient laying in bed comfortably this morning with no complaints or concerns. Medial pneumothorax was noted on Chest XR in the AM, subsequent CT chest confirmed 20% being the size. Patient was taken down to IR for chest tube placement which was successful. Attached to suction -20 mmHg. Patient also underwent hemodialysis today. Overall, still with a lot going on. Was told that patient is #2 in line for a bed at [facility] but given her ongoing acuity of care, will not be going to [facility] within the next 24 hours. All other ROS negative.    Medications    Current Medications    sodium chloride flush  5-40 mL IntraVENous 2 times per day    metoclopramide  5 mg IntraVENous Q8H    sodium chloride (Inhalant)  4 mL Nebulization Q6H    And    albuterol  2.5 mg Nebulization Q6H    lactobacillus  1 capsule Per NG tube Daily with breakfast    piperacillin-tazobactam  3,375 mg IntraVENous Q12H    chlorhexidine  15 mL Mouth/Throat BID    ketamine  100 mg IntraVENous Once    lidocaine 1 % injection  5 mL IntraDERmal Once    insulin lispro  0-6 Units SubCUTAneous Q6H    allopurinol  100 mg Oral Daily    phosphorus replacement protocol   Other RX Placeholder    potassium bicarb-citric acid  40 mEq Oral Once    tiotropium-olodaterol  2 puff Inhalation Daily    [Held by provider] aspirin  81 mg Oral Daily    pantoprazole  40 mg IntraVENous QAM    sodium hypochlorite   Irrigation Daily    Riociguat  2.5 mg Oral Q8H    FLUoxetine  40 mg Oral Daily    [Held by provider] metoprolol tartrate  12.5 mg Oral BID     sodium chloride flush, sodium chloride, LORazepam, lidocaine PF, ondansetron, albuterol, fentanNYL, glucose, dextrose, glucagon (rDNA), dextrose, polyethylene glycol, senna, acetaminophen  IV Drips/Infusions   sodium chloride      dextrose Stopped (11/25/21 1241)    [Held by provider] sodium chloride 50 mL/hr at 10/28/21 0275     Diet    ADULT TUBE FEEDING; PEG; Renal Formula; Continuous; 30; No; 240; Q 8 hours  Allergies    Patient has no known allergies. Vitals     height is 4' 9\" (1.448 m) and weight is 166 lb 7.2 oz (75.5 kg). Her oral temperature is 97.6 °F (36.4 °C). Her blood pressure is 127/65 and her pulse is 92. Her respiration is 17 and oxygen saturation is 97%. Body mass index is 36.02 kg/m². SUPPLEMENTAL O2: O2 Flow Rate (L/min): 16 L/min   Input/Output     Intake/Output Summary (Last 24 hours) at 11/26/2021 2035  Last data filed at 11/26/2021 1649  Gross per 24 hour   Intake 1586.23 ml   Output 2475 ml   Net -888.77 ml     I/O last 3 completed shifts: In: 1775.7 [I.V.:125.5; NG/GT:1109; IV Piggyback:141.3]  Out: 0724 [Urine:270; Stool:400]   Patient Vitals for the past 96 hrs (Last 3 readings):   Weight   11/26/21 1148 166 lb 7.2 oz (75.5 kg)   11/26/21 0750 169 lb 12.1 oz (77 kg)   11/25/21 0314 169 lb 12.1 oz (77 kg)     Physical Exam   General: Acute on chronically ill-appearing elderly white female on trach ventilation   HEENT: Temporal wasting appreciated. Normocephalic and atraumatic. No scleral icterus. PERR  Neck: supple. No Thyromegaly. Left subclavian dialysis catheter   Lungs: Mild diffuse rhonchi appreciated bilaterally No retractions. Chest tube in place on right. Cardiac: RRR. No JVD. Abdomen: soft. Nontender,nephrostomy tube with bloody drainage  Extremities: +1 pitting edema x4. (interval improvement noted) No clubbing, cyanosis   Vasculature: capillary refill < 3 seconds. Palpable dorsalis pedis pulses. Skin: warm and dry. Psych: Alert and oriented to person place and time, affect appropriate  Lymph:  No supraclavicular adenopathy. Neurologic: No focal deficit. No seizures.   Labs   ABG  Lab Results   Component Value Date    PH 7.36 11/13/2021    PO2 59 11/13/2021    PCO2 45 11/13/2021    HCO3 26 11/13/2021    O2SAT 89 11/13/2021     Lab Results   Component Value Date    IFIO2 70 11/13/2021    MODE PC 11/05/2021    SETPEEP 8.0 11/15/2021     CBC  Recent Labs     11/25/21  0555 11/26/21  1340   WBC 7.9 7.1   RBC 2.76* 2.79*   HGB 8.3* 8.4*   HCT 26.1* 26.0*   MCV 94.6 93.2   MCH 30.1 30.1   MCHC 31.8* 32.3    204   MPV 9.1* 9.1*      BMP  Recent Labs     11/24/21  0640 11/25/21  0555 11/26/21  0423   * 129* 129*   K 3.4* 4.0 4.0   CL 96* 94* 93*   CO2 23 23 22*   BUN 34* 41* 54*   CREATININE 2.7* 3.2* 3.5*   GLUCOSE 82 101 106   MG  --   --  1.8   CALCIUM 8.5 8.7 8.4*     LFT  No results for input(s): AST, ALT, ALB, BILITOT, ALKPHOS, LIPASE in the last 72 hours. Invalid input(s): AMYLASE  TROP  Lab Results   Component Value Date    TROPONINT 0.084 10/28/2021    TROPONINT 0.059 10/27/2021    TROPONINT 0.037 03/06/2020     BNP  No results for input(s): BNP in the last 72 hours. Lactic Acid  No results for input(s): LACTA in the last 72 hours. INR  No results for input(s): INR, PROTIME in the last 72 hours. PTT  No results for input(s): APTT in the last 72 hours. Glucose  Recent Labs     11/26/21  0615 11/26/21  1126 11/26/21  1649   POCGLU 110* 93 107     Microbiology     Culture with Smear, Acid Fast Bacillius [[id number]] Collected: 10/27/21 1550   Order Status: Completed Specimen: Body Fluid Updated: 11/23/21 1331    AFB Culture & Smear SEE BELOW   Culture, Anaerobic and Aerobic [[id number]] (Abnormal)  Collected: 11/19/21 1048   Order Status: Completed Specimen: Coccyx Updated: 11/23/21 0601    Aerobic Culture Culture also yielded very light growth of Staphylococcus species (coagulase negative), and additional enteric gram negative bacilli. At least one of drugs in current antibiotic therapy is ineffective in vitro for isolate.  Abnormal     Anaerobic Culture Culture overgrown by swarming Proteus species. No further evaluation of this culture is possible.  If a true mixed aerobic and anaerobic infection is suspected, then broad spectrum empiric antibiotic therapy is indicated and should include coverage for anaerobic organisms. Gram Stain Result No segmented neutrophils observed. No epithelial cells observed. No organisms observed. Organism Proteus mirabilis Abnormal     Aerobic Culture moderate growth     Organism Pseudomonas aeruginosa Abnormal     Aerobic Culture light growth      Procedures   - Hemodialysis on 11/23/21     Problem List      Active Hospital Problems    Diagnosis Date Noted    Paroxysmal atrial fibrillation ([address] [I48.0] 11/22/2021    Hemoptysis [R04.2]     Chronic respiratory failure with hypoxia (HCC) [J96.11]     Aspiration pneumonia of left lung ([address] [J69.0]     Pleural effusion [J90]     Shock ([address] [R57.9]     Flash pulmonary edema ([address] [J81.0]     Acute respiratory failure ([address] [J96.00] 10/27/2021    Pressure ulcer of right buttock, stage 3 ([address] [L89.313] 07/29/2021    Pulmonary HTN ([address] [I27.20] 09/03/2019      Assessment & Plan:   1. Subacute combined hypercapnic and hypoxic respiratory failure: D/t to pneumonia with L-sided pleural effusion and repeated mucous plugging. Intubated 10/27/21. Extubated following successful SBT on 11/9/21. Failed HFNC and was reintubated on 11/15/21. Tracheostomy placed 11/17/21.     - Pulmonology following    - Wean as tolerated   - Lung protective strategies    - Aspiration precautions    2. Spontaneous Pneumothorax on Right - 20%:  Chest XR on 11/26 showed stable appearance of right pneumothorax of about 10% as measured on the AP radiograph. Subsequent CT chest showed multiloculated pneumothorax right side approximately 20% in size. - Chest tube was place by IR on the right on 11/26  3. Stage III ALVIN on CKD 3: Suspect post renal etiology 2/2 staghorn calculi. Nephrology following. CRRT 11/5/21 until 11/12/21. Anti-DS-DNA negative, Anti histone negative, Anti Smith,and  Anti-Savana WNL, GBM antibodies negative, C3/C4 levels normal, elevated kappa/lambda free light chains with normal ratio. HD started 11/20/21. Positive for ANCA associated vasculitis. ANCA associated Abs show elevated anti MPO elevated 416 and serine proteinase 3 IgG WNL   - Plans for biopsy per nephrology when stable   - CVVH stopped 11/15/21 significant oliguria since that time   - Hemodialysis on 11/26  4. Dysphagia: 2/2 prolonged intubation. PEG tube placed on 11/24/21. Protonix daily   5. Hemoptysis: (resolved): Bronchoscopy on 11/22 remarkable for old blood stained mucus in RLL. No active bleed. - Pulm following, appreciate recs     - Jose Alfredo 300 mg by nebulization twice daily  6. Acute blood loss anemia with chronic normocytic anemia: 2/2 hematuria s/p perc tube placement w/ Iron studies consistent w/ MERCEDES vs Anemia chronic disease. B12 WNL, Folate low, Abs reticulocyte index 11/18/21 0.49% Soluable transferrin 139. Calculated Iron deficit 827mg. PRBC x1 transfused 10/31/21, 11/4/21, 11/5/21, x2 11/6/21, x3 11/8/21, x3 11/15/21.    - Heparin stopped (dialysis dose still running)   - Continue holding ASA    - Venofer, replace folate once ABx stopped  7. 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 Probes to bone, necrosis noted around wound site. CT abdomen and pelvis showing possible mi involvement with concern for osteomyelitis. Seen by general surgery on 11/17/21. No indications for debridement at the time. Poor candidate for surgery given multiple co-morbidities, malnutrition and poor wound healing. Wound cultures from 11/19 growing proteus mirabilis and pseudomonas aeruginosa. Completed 7-day course of Zosyn on 11/24 for proteus mirabilis & pseudomonas aeruginosa . - Wound care following, continue Dakins w/ dressing changes  8. Hypokalemia: Resolved. 3.4 on 11/24. Potassium replacement protocol in place. 9. Hyponatremia: 124 on 11/23 in setting of ALVIN stage III on CKD. Will get HD on 11/23/21.  Monitor with daily BMP   10. Hematuria: s/p percutaneous tube placement. CBI per above. Stopped heparin, holding ASA. 11. ANCA associated Vasculitis: workup per above. Will need Renal Bx for confirmation once stable. Discussed case w/ Nephrology. Completed pulse dose steroids starting on 11/8/21 w/ 10mg/kg methylprednisolone for 3 days. SSI started for coverage. Not candidate for Rituxin/TPE 2/2 existing infection. 12. Bilateral Pleural Effusion:   Associated volume overload in context of renal failure. Interval enlargement noted on CT 11/1/21. Currently on CVVH for effusions. Plan for chest tube insertion if no improvement  13. Hydronephrosis: S/p nephrostomy tube placement still some bloodstained discharge  14. Suspected COPD: current every day smoker. Obstructive process noted on flow volume loop on ventilator. Started empiric therapy with Stiolto. Recommend formal PFT once improved  15. PAH/chronic RV failure NYHA II: EF 55 to 60% G2 DD TTE 5/4/2021. RHC showed [place] [address] with elevated PCWP. Treated w/ Riociguat. 16. Chronic tobacco use:  cessation  17. Gout: Continue allopurinol  18. BARBER: non compliant w/ CPAP   19. Septic Shock: Resolved. 2/2 severe PNA now concern for necrotizing wound on sacrum. Initial CI 6.3 w/ NE precludes concern for septic shock NE d/c 2/2 worsening afib RVR  20. Thrombocytopenia: Resolved. Suspect consumptive process in context of sepsis. Not on heparin    21. Severe bilateral multiorganism pneumonia: Resolved. PCR positive Staph w/ MECA gene and adenovirus consistent w/ MRSA colonization. Staph completed 14 days Vancomycin (started 10/28/21) and completed 5 days Rocephin. Clindamycin started for coverage of PVL as patient clinically worsening following 14 days Vancomycin for MRSA coverage. Underwent bronchoscopy for left mainstem mucus plug noted CXR on 11/11/21. Switched to Clindamycin for coverage of PVL received 8 days therapy (started 11/11/21). PNA panel from 11/15/21 negative. Repeat cultures show no growth    PT/OT Eval Status: Daily PT    VTE prophylaxis: [] Lovenox                                 [x] SCDs                                 [] SQ Heparin                                 [] Encourage ambulation           [] Already on Anticoagulation     IV Fluids: None   Diet:  PEG tube   Code Status: Full Code  Disposition: [employer] LTACH once bed available & stable.  Guarded long term prognosis     Tele:   [x] yes             [] no    Electronically signed by   Kyara Madsen DO on 11/26/2021 at 8:35 PM

## 2021-11-27 NOTE — PROGRESS NOTES
Renal Progress Note  [facility]    Patient :  Tye Aragon; 73 y.o. MRN# [id number]  Location:  [facility]12/012-A  Attending:  Lucian Phillips DO  Admit Date:  10/27/2021   Hospital Day: 32      Subjective:     Nephrology is following the patient for ALVIN requiring hemodialysis. Patient seen and examined. Family members at bedside with questions. She is on vent. Patient had HD yesterday. Making urine but is oliguric. Had chest tube placed yesterday. Outpatient Medications:     Medications Prior to Admission: metoprolol tartrate (LOPRESSOR) 25 MG tablet, Take 0.5 tablets by mouth 2 times daily  sodium hypochlorite (DAKINS) 0.125 % SOLN external solution, Apply Dakin's moistened gauze dressings to wound twice daily and as needed.   sodium chloride 1 g tablet, Take 1 tablet by mouth 2 times daily  allopurinol (ZYLOPRIM) 300 MG tablet, Take 1 tablet by mouth daily  FLUoxetine (PROZAC) 40 MG capsule, Take 1 capsule by mouth daily  potassium chloride (KLOR-CON M) 10 MEQ extended release tablet, Take 1 tablet by mouth every other day  bumetanide (BUMEX) 1 MG tablet, Take 1 tablet by mouth daily  Multiple Vitamins-Minerals (MULTIVITAMIN WOMEN PO), Take 1 tablet by mouth daily  acetaminophen (TYLENOL) 650 MG extended release tablet, Take 650 mg by mouth every 8 hours as needed for Pain  Riociguat (ADEMPAS) 2.5 MG TABS, Take 2.5 mg by mouth 3 times daily  docusate sodium (COLACE) 100 MG capsule, Take 100 mg by mouth as needed   aspirin 81 MG tablet, Take 81 mg by mouth daily     Current Medications:     Scheduled Meds:    metoprolol tartrate  12.5 mg Oral BID    sodium chloride flush  5-40 mL IntraVENous 2 times per day    metoclopramide  5 mg IntraVENous Q8H    sodium chloride (Inhalant)  4 mL Nebulization Q6H    And    albuterol  2.5 mg Nebulization Q6H    lactobacillus  1 capsule Per NG tube Daily with breakfast    piperacillin-tazobactam  3,375 mg IntraVENous Q12H    chlorhexidine  15 mL Mouth/Throat BID    insulin lispro  0-6 Units SubCUTAneous Q6H    allopurinol  100 mg Oral Daily    phosphorus replacement protocol   Other RX Placeholder    potassium bicarb-citric acid  40 mEq Oral Once    tiotropium-olodaterol  2 puff Inhalation Daily    [Held by provider] aspirin  81 mg Oral Daily    pantoprazole  40 mg IntraVENous QAM    sodium hypochlorite   Irrigation Daily    Riociguat  2.5 mg Oral Q8H    FLUoxetine  40 mg Oral Daily     Continuous Infusions:    sodium chloride      dextrose Stopped (21 0336)    [Held by provider] sodium chloride 50 mL/hr at 10/28/21 2314     PRN Meds:  sodium chloride flush, sodium chloride, LORazepam, lidocaine PF, ondansetron, albuterol, fentanNYL, glucose, dextrose, glucagon (rDNA), dextrose, polyethylene glycol, senna, acetaminophen    Input/Output:       I/O last 3 completed shifts: In: 1628.2 [I.V.:30; NG/GT:1045; IV Piggyback:153.2]  Out: 6172 [Urine:430; Chest Tube:49]. Patient Vitals for the past 96 hrs (Last 3 readings):   Weight   21 0415 168 lb 3.2 oz (76.3 kg)   21 1148 166 lb 7.2 oz (75.5 kg)   21 0750 169 lb 12.1 oz (77 kg)       Vital Signs:   Temperature:  Temp: 98.5 °F (36.9 °C)  TMax:   Temp (24hrs), Av.1 °F (36.7 °C), Min:97.6 °F (36.4 °C), Max:98.5 °F (36.9 °C)    Respirations:  Resp: 23  Pulse:   Pulse: 101  BP:    BP: 120/62  BP Range: Systolic (91KWN), YCQ:101 , Min:110 , JM       Diastolic (26ZYJ), DGI:38, Min:52, Max:67      Physical Examination:     General:  Awake on ventilator  HEENT: +trach  Chest:              Diminished breath sounds  Cardiac:  S1 S2   Abdomen: Soft, non-tender,  Neuro:   Follows commands  SKIN:  No rashes  Extremities:  1+ edema    Labs:       Recent Labs     21  0555 21  1340 21  0420   WBC 7.9 7.1 7.3   RBC 2.76* 2.79* 2.66*   HGB 8.3* 8.4* 7.9*   HCT 26.1* 26.0* 25.3*   MCV 94.6 93.2 95.1   MCH 30.1 30.1 29.7   MCHC 31.8* 32.3 31.2*    204 213 No components found for: TOTALPROTEIN, URINE   Urine Creatinine:     Lab Results   Component Value Date    LABCREA 81.5 10/25/2021     Urine Eosinophils:  No components found for: UEOS        Impression and Plan:  1. ALVIN likely due to ATN, requiring HD  -urine output remains poor.   -mainly plan for dialysis on Monday    2. Mild hyponatremia  3. Volume overload, improving  4. Left sided nephrostomy tube  5. Left staghorn calculus  6. + ANCA  7.resp failure on vent  8. Pneumothorax        Please don't hesitate to call with any questions.   Electronically signed by [id number] Ann Romero DO on 11/27/2021 at 11:01 AM

## 2021-11-28 ENCOUNTER — APPOINTMENT (OUTPATIENT)
Dept: GENERAL RADIOLOGY | Age: 71
DRG: 004 | End: 2021-11-28
Payer: MEDICARE

## 2021-11-28 LAB
ACINETOBACTER CALCOACETICUS-BAUMANNII BY PCR: NOT DETECTED
ADENOVIRUS BY PCR: NOT DETECTED
ANION GAP SERPL CALCULATED.3IONS-SCNC: 9 MEQ/L (ref 8–16)
BASOPHILS # BLD: 0.8 %
BASOPHILS ABSOLUTE: 0.1 THOU/MM3 (ref 0–0.1)
BUN BLDV-MCNC: 42 MG/DL (ref 7–22)
CALCIUM IONIZED: 1.23 MMOL/L (ref 1.12–1.32)
CALCIUM SERPL-MCNC: 8.4 MG/DL (ref 8.5–10.5)
CHLAMYDIA PNEUMONIAE BY PCR: NOT DETECTED
CHLORIDE BLD-SCNC: 96 MEQ/L (ref 98–111)
CO2: 26 MEQ/L (ref 23–33)
CREAT SERPL-MCNC: 2.9 MG/DL (ref 0.4–1.2)
ENTEROBACTER CLOACAE COMPLEX BY PCR: NOT DETECTED
EOSINOPHIL # BLD: 5 %
EOSINOPHILS ABSOLUTE: 0.4 THOU/MM3 (ref 0–0.4)
ERYTHROCYTE [DISTWIDTH] IN BLOOD BY AUTOMATED COUNT: 16.7 % (ref 11.5–14.5)
ERYTHROCYTE [DISTWIDTH] IN BLOOD BY AUTOMATED COUNT: 58.4 FL (ref 35–45)
ESCHERICHIA COLI BY PCR: NOT DETECTED
GFR SERPL CREATININE-BSD FRML MDRD: 16 ML/MIN/1.73M2
GLUCOSE BLD-MCNC: 106 MG/DL (ref 70–108)
GLUCOSE BLD-MCNC: 107 MG/DL (ref 70–108)
GLUCOSE BLD-MCNC: 110 MG/DL (ref 70–108)
GLUCOSE BLD-MCNC: 112 MG/DL (ref 70–108)
GLUCOSE BLD-MCNC: 96 MG/DL (ref 70–108)
HAEMOPHILUS INFLUENZAE BY PCR: NOT DETECTED
HCT VFR BLD CALC: 25.5 % (ref 37–47)
HEMOGLOBIN: 7.9 GM/DL (ref 12–16)
IMMATURE GRANS (ABS): 0.1 THOU/MM3 (ref 0–0.07)
IMMATURE GRANULOCYTES: 1.4 %
INFLUENZA A BY PCR: NOT DETECTED
INFLUENZA B BY PCR: NOT DETECTED
KLEBSIELLA AEROGENES BY PCR: NOT DETECTED
KLEBSIELLA OXYTOCA BY PCR: NOT DETECTED
KLEBSIELLA PNEUMONIAE GROUP BY PCR: NOT DETECTED
LEGIONELLA PNEUMOPHILIA BY PCR: NOT DETECTED
LYMPHOCYTES # BLD: 8.6 %
LYMPHOCYTES ABSOLUTE: 0.6 THOU/MM3 (ref 1–4.8)
MAGNESIUM: 1.6 MG/DL (ref 1.6–2.4)
MCH RBC QN AUTO: 29.7 PG (ref 26–33)
MCHC RBC AUTO-ENTMCNC: 31 GM/DL (ref 32.2–35.5)
MCV RBC AUTO: 95.9 FL (ref 81–99)
METAPNEUMOVIRUS BY PCR: NOT DETECTED
MONOCYTES # BLD: 8.3 %
MONOCYTES ABSOLUTE: 0.6 THOU/MM3 (ref 0.4–1.3)
MORAXELLA CATARRHALIS BY PCR: NOT DETECTED
MYCOPLASMA PNEUMONIAE BY PCR: NOT DETECTED
NON-SARS CORONAVIRUS: NOT DETECTED
NUCLEATED RED BLOOD CELLS: 0 /100 WBC
PARAINFLUENZA VIRUS BY PCR: NOT DETECTED
PLATELET # BLD: 242 THOU/MM3 (ref 130–400)
PMV BLD AUTO: 8.9 FL (ref 9.4–12.4)
POTASSIUM REFLEX MAGNESIUM: 3.7 MEQ/L (ref 3.5–5.2)
PROTEUS SPECIES BY PCR: NOT DETECTED
PSEUDOMONAS AERUGINOSA BY PCR: NOT DETECTED
RBC # BLD: 2.66 MILL/MM3 (ref 4.2–5.4)
RESISTANT GENE CTX-M BY PCR: NORMAL
RESISTANT GENE IMP BY PCR: NORMAL
RESISTANT GENE KPC BY PCR: NORMAL
RESISTANT GENE MECA/C & MREJ BY PCR: NORMAL
RESISTANT GENE NDM BY PCR: NORMAL
RESISTANT GENE OXA-48-LIKE BY PCR: NORMAL
RESISTANT GENE VIM BY PCR: NORMAL
RESPIRATORY SYNCYTIAL VIRUS BY PCR: NOT DETECTED
RHINOVIRUS ENTEROVIRUS PCR: NOT DETECTED
SEG NEUTROPHILS: 75.9 %
SEGMENTED NEUTROPHILS ABSOLUTE COUNT: 5.5 THOU/MM3 (ref 1.8–7.7)
SERRATIA MARCESCENS BY PCR: NOT DETECTED
SODIUM BLD-SCNC: 131 MEQ/L (ref 135–145)
SOURCE: NORMAL
SPECIMEN ACCEPTABILITY: NORMAL
STAPH AUREUS BY PCR: NOT DETECTED
STREP AGALACTIAE BY PCR: NOT DETECTED
STREP PNEUMONIAE BY PCR: NOT DETECTED
STREP PYOGENES BY PCR: NOT DETECTED
WBC # BLD: 7.3 THOU/MM3 (ref 4.8–10.8)

## 2021-11-28 PROCEDURE — 87798 DETECT AGENT NOS DNA AMP: CPT

## 2021-11-28 PROCEDURE — 6360000002 HC RX W HCPCS: Performed by: INTERNAL MEDICINE

## 2021-11-28 PROCEDURE — 94761 N-INVAS EAR/PLS OXIMETRY MLT: CPT

## 2021-11-28 PROCEDURE — 82330 ASSAY OF CALCIUM: CPT

## 2021-11-28 PROCEDURE — 83735 ASSAY OF MAGNESIUM: CPT

## 2021-11-28 PROCEDURE — 36415 COLL VENOUS BLD VENIPUNCTURE: CPT

## 2021-11-28 PROCEDURE — 6370000000 HC RX 637 (ALT 250 FOR IP): Performed by: INTERNAL MEDICINE

## 2021-11-28 PROCEDURE — C9113 INJ PANTOPRAZOLE SODIUM, VIA: HCPCS | Performed by: NURSE PRACTITIONER

## 2021-11-28 PROCEDURE — 87541 LEGION PNEUMO DNA AMP PROB: CPT

## 2021-11-28 PROCEDURE — 6360000002 HC RX W HCPCS: Performed by: STUDENT IN AN ORGANIZED HEALTH CARE EDUCATION/TRAINING PROGRAM

## 2021-11-28 PROCEDURE — 87581 M.PNEUMON DNA AMP PROBE: CPT

## 2021-11-28 PROCEDURE — 2700000000 HC OXYGEN THERAPY PER DAY

## 2021-11-28 PROCEDURE — 2720000010 HC SURG SUPPLY STERILE

## 2021-11-28 PROCEDURE — 99232 SBSQ HOSP IP/OBS MODERATE 35: CPT | Performed by: INTERNAL MEDICINE

## 2021-11-28 PROCEDURE — 0B978ZZ DRAINAGE OF LEFT MAIN BRONCHUS, VIA NATURAL OR ARTIFICIAL OPENING ENDOSCOPIC: ICD-10-PCS | Performed by: INTERNAL MEDICINE

## 2021-11-28 PROCEDURE — 2060000000 HC ICU INTERMEDIATE R&B

## 2021-11-28 PROCEDURE — 94003 VENT MGMT INPAT SUBQ DAY: CPT

## 2021-11-28 PROCEDURE — 2580000003 HC RX 258: Performed by: INTERNAL MEDICINE

## 2021-11-28 PROCEDURE — 80048 BASIC METABOLIC PNL TOTAL CA: CPT

## 2021-11-28 PROCEDURE — 89220 SPUTUM SPECIMEN COLLECTION: CPT

## 2021-11-28 PROCEDURE — 6370000000 HC RX 637 (ALT 250 FOR IP): Performed by: FAMILY MEDICINE

## 2021-11-28 PROCEDURE — 31646 BRNCHSC W/THER ASPIR SBSQ: CPT | Performed by: INTERNAL MEDICINE

## 2021-11-28 PROCEDURE — 6360000002 HC RX W HCPCS: Performed by: NURSE PRACTITIONER

## 2021-11-28 PROCEDURE — 87631 RESP VIRUS 3-5 TARGETS: CPT

## 2021-11-28 PROCEDURE — 87106 FUNGI IDENTIFICATION YEAST: CPT

## 2021-11-28 PROCEDURE — 87205 SMEAR GRAM STAIN: CPT

## 2021-11-28 PROCEDURE — 6370000000 HC RX 637 (ALT 250 FOR IP): Performed by: NURSE PRACTITIONER

## 2021-11-28 PROCEDURE — 99233 SBSQ HOSP IP/OBS HIGH 50: CPT | Performed by: INTERNAL MEDICINE

## 2021-11-28 PROCEDURE — 2500000003 HC RX 250 WO HCPCS: Performed by: INTERNAL MEDICINE

## 2021-11-28 PROCEDURE — 85025 COMPLETE CBC W/AUTO DIFF WBC: CPT

## 2021-11-28 PROCEDURE — 3609027000 HC BRONCHOSCOPY

## 2021-11-28 PROCEDURE — 87486 CHLMYD PNEUM DNA AMP PROBE: CPT

## 2021-11-28 PROCEDURE — 6360000002 HC RX W HCPCS: Performed by: PHARMACIST

## 2021-11-28 PROCEDURE — 2580000003 HC RX 258: Performed by: STUDENT IN AN ORGANIZED HEALTH CARE EDUCATION/TRAINING PROGRAM

## 2021-11-28 PROCEDURE — 87070 CULTURE OTHR SPECIMN AEROBIC: CPT

## 2021-11-28 PROCEDURE — 71045 X-RAY EXAM CHEST 1 VIEW: CPT

## 2021-11-28 PROCEDURE — 94640 AIRWAY INHALATION TREATMENT: CPT

## 2021-11-28 PROCEDURE — 6370000000 HC RX 637 (ALT 250 FOR IP): Performed by: STUDENT IN AN ORGANIZED HEALTH CARE EDUCATION/TRAINING PROGRAM

## 2021-11-28 PROCEDURE — 82948 REAGENT STRIP/BLOOD GLUCOSE: CPT

## 2021-11-28 RX ORDER — LIDOCAINE HYDROCHLORIDE 10 MG/ML
INJECTION, SOLUTION EPIDURAL; INFILTRATION; INTRACAUDAL; PERINEURAL
Status: COMPLETED | OUTPATIENT
Start: 2021-11-28 | End: 2021-11-28

## 2021-11-28 RX ORDER — MIDAZOLAM HYDROCHLORIDE 1 MG/ML
4 INJECTION INTRAMUSCULAR; INTRAVENOUS ONCE
Status: DISCONTINUED | OUTPATIENT
Start: 2021-11-28 | End: 2021-12-01 | Stop reason: HOSPADM

## 2021-11-28 RX ORDER — KETAMINE HCL IN NACL, ISO-OSM 100MG/10ML
50 SYRINGE (ML) INJECTION ONCE
Status: DISCONTINUED | OUTPATIENT
Start: 2021-11-28 | End: 2021-12-01 | Stop reason: HOSPADM

## 2021-11-28 RX ORDER — MIDAZOLAM HYDROCHLORIDE 1 MG/ML
INJECTION INTRAMUSCULAR; INTRAVENOUS
Status: COMPLETED | OUTPATIENT
Start: 2021-11-28 | End: 2021-11-28

## 2021-11-28 RX ORDER — LIDOCAINE HYDROCHLORIDE 10 MG/ML
20 INJECTION, SOLUTION EPIDURAL; INFILTRATION; INTRACAUDAL; PERINEURAL ONCE
Status: DISCONTINUED | OUTPATIENT
Start: 2021-11-28 | End: 2021-12-01 | Stop reason: HOSPADM

## 2021-11-28 RX ORDER — KETAMINE HCL IN NACL, ISO-OSM 100MG/10ML
SYRINGE (ML) INJECTION
Status: COMPLETED | OUTPATIENT
Start: 2021-11-28 | End: 2021-11-28

## 2021-11-28 RX ADMIN — SODIUM HYPOCHLORITE: 1.25 SOLUTION TOPICAL at 09:35

## 2021-11-28 RX ADMIN — SODIUM CHLORIDE, PRESERVATIVE FREE 10 ML: 5 INJECTION INTRAVENOUS at 17:03

## 2021-11-28 RX ADMIN — FLUOXETINE 40 MG: 20 CAPSULE ORAL at 09:36

## 2021-11-28 RX ADMIN — PANTOPRAZOLE SODIUM 40 MG: 40 INJECTION, POWDER, FOR SOLUTION INTRAVENOUS at 09:36

## 2021-11-28 RX ADMIN — PIPERACILLIN AND TAZOBACTAM 3375 MG: 3; .375 INJECTION, POWDER, LYOPHILIZED, FOR SOLUTION INTRAVENOUS at 10:52

## 2021-11-28 RX ADMIN — ALBUTEROL SULFATE 2.5 MG: 2.5 SOLUTION RESPIRATORY (INHALATION) at 00:07

## 2021-11-28 RX ADMIN — SODIUM CHLORIDE SOLN NEBU 3% 4 ML: 3 NEBU SOLN at 05:48

## 2021-11-28 RX ADMIN — SODIUM CHLORIDE, PRESERVATIVE FREE 10 ML: 5 INJECTION INTRAVENOUS at 09:35

## 2021-11-28 RX ADMIN — MIDAZOLAM 2 MG: 1 INJECTION INTRAMUSCULAR; INTRAVENOUS at 15:04

## 2021-11-28 RX ADMIN — METOCLOPRAMIDE HYDROCHLORIDE 5 MG: 5 INJECTION INTRAMUSCULAR; INTRAVENOUS at 17:03

## 2021-11-28 RX ADMIN — METOCLOPRAMIDE HYDROCHLORIDE 5 MG: 5 INJECTION INTRAMUSCULAR; INTRAVENOUS at 09:36

## 2021-11-28 RX ADMIN — Medication 1 CAPSULE: at 09:39

## 2021-11-28 RX ADMIN — METOPROLOL TARTRATE 12.5 MG: 25 TABLET, FILM COATED ORAL at 09:36

## 2021-11-28 RX ADMIN — CHLORHEXIDINE GLUCONATE 15 ML: 1.2 RINSE ORAL at 21:19

## 2021-11-28 RX ADMIN — CHLORHEXIDINE GLUCONATE 15 ML: 1.2 RINSE ORAL at 09:35

## 2021-11-28 RX ADMIN — METOCLOPRAMIDE HYDROCHLORIDE 5 MG: 5 INJECTION INTRAMUSCULAR; INTRAVENOUS at 00:36

## 2021-11-28 RX ADMIN — SODIUM CHLORIDE SOLN NEBU 3% 4 ML: 3 NEBU SOLN at 00:06

## 2021-11-28 RX ADMIN — LIDOCAINE HYDROCHLORIDE 10 ML: 10 INJECTION, SOLUTION EPIDURAL; INFILTRATION; INTRACAUDAL; PERINEURAL at 15:05

## 2021-11-28 RX ADMIN — ACETAMINOPHEN 650 MG: 325 TABLET ORAL at 10:14

## 2021-11-28 RX ADMIN — ALLOPURINOL 100 MG: 100 TABLET ORAL at 09:40

## 2021-11-28 RX ADMIN — METOPROLOL TARTRATE 12.5 MG: 25 TABLET, FILM COATED ORAL at 21:19

## 2021-11-28 RX ADMIN — SODIUM CHLORIDE 25 ML: 9 INJECTION, SOLUTION INTRAVENOUS at 22:30

## 2021-11-28 RX ADMIN — SODIUM CHLORIDE, PRESERVATIVE FREE 10 ML: 5 INJECTION INTRAVENOUS at 21:20

## 2021-11-28 RX ADMIN — ALBUTEROL SULFATE 2.5 MG: 2.5 SOLUTION RESPIRATORY (INHALATION) at 05:47

## 2021-11-28 RX ADMIN — PIPERACILLIN AND TAZOBACTAM 3375 MG: 3; .375 INJECTION, POWDER, LYOPHILIZED, FOR SOLUTION INTRAVENOUS at 22:31

## 2021-11-28 RX ADMIN — Medication 50 MG: at 15:04

## 2021-11-28 ASSESSMENT — PAIN DESCRIPTION - PROGRESSION: CLINICAL_PROGRESSION: RAPIDLY IMPROVING

## 2021-11-28 ASSESSMENT — PAIN SCALES - GENERAL
PAINLEVEL_OUTOF10: 0
PAINLEVEL_OUTOF10: 3

## 2021-11-28 ASSESSMENT — PAIN DESCRIPTION - LOCATION: LOCATION: LEG

## 2021-11-28 ASSESSMENT — PAIN DESCRIPTION - FREQUENCY: FREQUENCY: INTERMITTENT

## 2021-11-28 ASSESSMENT — PAIN DESCRIPTION - DESCRIPTORS: DESCRIPTORS: DISCOMFORT

## 2021-11-28 ASSESSMENT — PULMONARY FUNCTION TESTS
PIF_VALUE: 22
PIF_VALUE: 16
PIF_VALUE: 16

## 2021-11-28 ASSESSMENT — PAIN DESCRIPTION - ORIENTATION: ORIENTATION: RIGHT

## 2021-11-28 ASSESSMENT — PAIN DESCRIPTION - PAIN TYPE: TYPE: CHRONIC PAIN

## 2021-11-28 NOTE — FLOWSHEET NOTE
11/28/21 0743   Provider Notification   Reason for Communication Review case; Evaluate   Provider Name Dr Leoncio Gasca   Provider Notification Physician   Method of Communication Secure Message   Notification Time [id number]   sent message regarding recent chest xray abnormal results. Dr Leoncio Gasca read note.

## 2021-11-28 NOTE — PROGRESS NOTES
Renal Progress Note  [facility]    Patient :  Beata Wu; 73 y.o. MRN# [id number]  Location:  [facility]12/012-A  Attending:  Manuel Varner DO  Admit Date:  10/27/2021   Hospital Day: 28      Subjective:     Nephrology is following the patient for ALVIN requiring hemodialysis. Patient seen and examined. CXR showed complete left lung opacification. On same vent settings 30% FiO2. Poor urine output. Outpatient Medications:     Medications Prior to Admission: metoprolol tartrate (LOPRESSOR) 25 MG tablet, Take 0.5 tablets by mouth 2 times daily  sodium hypochlorite (DAKINS) 0.125 % SOLN external solution, Apply Dakin's moistened gauze dressings to wound twice daily and as needed.   sodium chloride 1 g tablet, Take 1 tablet by mouth 2 times daily  allopurinol (ZYLOPRIM) 300 MG tablet, Take 1 tablet by mouth daily  FLUoxetine (PROZAC) 40 MG capsule, Take 1 capsule by mouth daily  potassium chloride (KLOR-CON M) 10 MEQ extended release tablet, Take 1 tablet by mouth every other day  bumetanide (BUMEX) 1 MG tablet, Take 1 tablet by mouth daily  Multiple Vitamins-Minerals (MULTIVITAMIN WOMEN PO), Take 1 tablet by mouth daily  acetaminophen (TYLENOL) 650 MG extended release tablet, Take 650 mg by mouth every 8 hours as needed for Pain  Riociguat (ADEMPAS) 2.5 MG TABS, Take 2.5 mg by mouth 3 times daily  docusate sodium (COLACE) 100 MG capsule, Take 100 mg by mouth as needed   aspirin 81 MG tablet, Take 81 mg by mouth daily     Current Medications:     Scheduled Meds:    midazolam  4 mg IntraVENous Once    ketamine  50 mg IntraVENous Once    lidocaine PF  20 mL IntraDERmal Once    metoprolol tartrate  12.5 mg Oral BID    sodium chloride flush  5-40 mL IntraVENous 2 times per day    metoclopramide  5 mg IntraVENous Q8H    sodium chloride (Inhalant)  4 mL Nebulization Q6H    And    albuterol  2.5 mg Nebulization Q6H    lactobacillus  1 capsule Per NG tube Daily with breakfast    piperacillin-tazobactam  3,375 mg IntraVENous Q12H    chlorhexidine  15 mL Mouth/Throat BID    insulin lispro  0-6 Units SubCUTAneous Q6H    allopurinol  100 mg Oral Daily    phosphorus replacement protocol   Other RX Placeholder    potassium bicarb-citric acid  40 mEq Oral Once    tiotropium-olodaterol  2 puff Inhalation Daily    [Held by provider] aspirin  81 mg Oral Daily    pantoprazole  40 mg IntraVENous QAM    sodium hypochlorite   Irrigation Daily    Riociguat  2.5 mg Oral Q8H    FLUoxetine  40 mg Oral Daily     Continuous Infusions:    sodium chloride      dextrose Stopped (21 0336)    [Held by provider] sodium chloride 50 mL/hr at 10/28/21 2314     PRN Meds:  sodium chloride flush, sodium chloride, LORazepam, lidocaine PF, ondansetron, albuterol, fentanNYL, glucose, dextrose, glucagon (rDNA), dextrose, polyethylene glycol, senna, acetaminophen    Input/Output:       I/O last 3 completed shifts: In: 1413.3 [P.O.:20; NG/GT:1295; IV Piggyback:98.3]  Out: 349 [Urine:225; Stool:100; Chest Tube:24]. Patient Vitals for the past 96 hrs (Last 3 readings):   Weight   21 0215 167 lb 1.7 oz (75.8 kg)   21 0415 168 lb 3.2 oz (76.3 kg)   21 1148 166 lb 7.2 oz (75.5 kg)       Vital Signs:   Temperature:  Temp: 99.2 °F (37.3 °C)  TMax:   Temp (24hrs), Av.4 °F (36.9 °C), Min:98.1 °F (36.7 °C), Max:99.2 °F (37.3 °C)    Respirations:  Resp: 24  Pulse:   Pulse: 95  BP:    BP: (!) 120/58  BP Range: Systolic (65CMM), IEJ:741 , Min:105 , FDY:074       Diastolic (07RPQ), KLW:47, Min:49, Max:60      Physical Examination:     General:  Awake on ventilator  HEENT: +trach  Chest:              Diminished breath sounds  Cardiac:  S1 S2   Abdomen: Soft, non-tender,  Neuro:   Follows commands  SKIN:  No rashes  Extremities:  1+ edema    Labs:       Recent Labs     21  1340 21  0420 21  0320   WBC 7.1 7.3 7.3   RBC 2.79* 2.66* 2.66*   HGB 8.4* 7.9* 7.9*   HCT 26.0* 25.3* 25.5*   MCV 93.2 95.1 95.9   MCH 30.1 29.7 29.7   MCHC 32.3 31.2* 31.0*    213 242   MPV 9.1* 8.7* 8.9*      BMP:   Recent Labs     11/26/21 0423 11/27/21 0420 11/28/21  0320   * 134* 131*   K 4.0 3.9 3.7   CL 93* 99 96*   CO2 22* 26 26   BUN 54* 30* 42*   CREATININE 3.5* 2.5* 2.9*   GLUCOSE 106 113* 107   CALCIUM 8.4* 8.4* 8.4*      Phosphorus:   No results for input(s): PHOS in the last 72 hours. Magnesium:    Recent Labs     11/26/21 0423 11/27/21 0420 11/28/21  0320   MG 1.8 1.7 1.6     Albumin:  No results for input(s): LABALBU in the last 72 hours.   BNP:      Lab Results   Component Value Date    BNP 23 10/04/2017     RYLAN:    No results found for: RYLAN  SPEP:  Lab Results   Component Value Date    PROT 5.2 11/23/2021     UPEP:   No results found for: LABPE  C3:     Lab Results   Component Value Date    C3 93 10/25/2021     C4:     Lab Results   Component Value Date    C4 22 10/25/2021     MPO ANCA:   No results found for: MPO  PR3 ANCA:   No results found for: PR3  Anti-GBM:   No results found for: GBMABIGG  Hep BsAg:         Lab Results   Component Value Date    HEPBSAG Negative 11/20/2021     Hep C AB:          Lab Results   Component Value Date    HEPCAB Negative 10/25/2021       Urinalysis/Chemistries:      Lab Results   Component Value Date    NITRU NEGATIVE 11/03/2021    COLORU RED 11/03/2021    PHUR 6.5 11/03/2021    LABCAST NONE SEEN 11/03/2021    WBCUA 10-15W/CLUMPS 11/03/2021    RBCUA > 200 11/03/2021    MUCUS THREADS 10/25/2021    YEAST NONE SEEN 11/03/2021    BACTERIA FEW 11/03/2021    SPECGRAV 1.024 11/03/2021    LEUKOCYTESUR SMALL 11/03/2021    LEUKOCYTESUR LARGE 10/25/2021    UROBILINOGEN 0.2 11/03/2021    BILIRUBINUR NEGATIVE 11/03/2021    BLOODU LARGE 11/03/2021    GLUCOSEU NEGATIVE 03/06/2020    KETUA NEGATIVE 11/03/2021    AMORPHOUS OBSCURED 08/23/2019     Urine Sodium:   No results found for: MARION  Urine Potassium:  No results found for: KUR  Urine Chloride:  No results found for: CLUR  Urine Osmolarity:   Lab Results   Component Value Date    OSMOU 356 09/16/2019     Urine Protein:   No components found for: TOTALPROTEIN, URINE   Urine Creatinine:     Lab Results   Component Value Date    LABCREA 81.5 10/25/2021     Urine Eosinophils:  No components found for: UEOS        Impression and Plan:  1. ALVIN likely due to ATN, requiring HD  -urine output remains poor, HD tomorrow  -will need tunneled dialysis catheter if repeat blood cx remain negative    2. Mild hyponatremia  3. Volume overload, improving  4. Left sided nephrostomy tube  5. Left staghorn calculus  6. + ANCA  7.resp failure on vent  8. Pneumothorax  9. Left lung opacification, ? bronch        Please don't hesitate to call with any questions.   Electronically signed by Vandana Matute DO on 11/28/2021 at 10:51 AM

## 2021-11-28 NOTE — PROCEDURES
Bronchoscopy Inpatient Procedure Note    Date of Procedure: 11/28/2021    Pre-op Diagnosis: Left lung white out. Aspiration pneumonia. After respiratory failure. Post-op Diagnosis: Left lung white out. Aspiration pneumonia. After respiratory failure. Bronchoscopist:   Dr Anitra Gilman MD     Anesthesia: Conscious Sedation. Total Dose: Versed - 2 mg ketamine 50 g    Procedure: Therapeutic and diagnostic flexible fiberoptic bronchoscopy    Estimated Blood Loss: Minimal    Complications: None    Indications and History      (Please see today's progress notes for the latest issues,  physical exam and lab data)    Consent to Procedure  The risks, benefits, complications, treatment options and expected outcomes were discussed with the patient and/or POA  The possibilities of reaction to medication, pulmonary aspiration, perforation of a viscus, bleeding, failure to diagnose a condition and creating a complication requiring transfusion or operation were discussed with the patient who freely signed the consent. Description of Procedure  The patient was transferred to ICU bed temporarily for the procedure. The patient was on mechanical ventilation through tracheostomy tube and placed on 100% oxygen. Rodrick Torres was monitored by the Critical Nursing and Respiratory therapy staff and the standard ICU monitoring devices. Beata Plan and the procedure were verified as Flexible Fiberoptic Bronchoscopy. A Time Out was held and the above information confirmed. The bronchoscope was then passed into the trachea via the tracheostomy tube tube. Lidocaine 1% solution 10 ml was applied to the trach. After careful inspection of the tracheal, the bronchoscope was sequentially passed into all segments of the left and right endobronchial trees to the second and/or third divisions. Endobronchial findings      Trachea: No tracheal stenosis.     Tanja  Normal mucosa and Sharp    Right Main Stem Bronchus  Normal mucosa  Right Upper Lobe Bronchi Normal mucosa  Right Middle Lobe Bronchi  Normal mucosa  Right Lower Lobe Bronchi (including the Superior segment)  Normal mucosa    Left Main Stem Bronchus there was mucous plug in the left main stem. Was aspirated and had to remove the bronchoscopy multiple times out and flushed the bronchoscope. Large pieces of mucous plugs were aspirated  Left Upper Lobe Bronchus, Upper Division Edematous mucosa and erythema, easily collapsible. Left Upper Lobe Bronchus, Lingula  Edematous mucosa and erythema, easily collapsible. Left Lower Lobe Bronchus (including the Superior segment)  Edematous mucosa and erythema, easily collapsible. Specimens Taken    Washings -   Location -left bronchial washing      Recommendation:  Follow-up cultures and pneumonia panel. Keep the patient on vent until tomorrow morning. Chest x-ray in a.m. [employer].

## 2021-11-28 NOTE — PROGRESS NOTES
fevers/chills, chest pain, palpitations, cough, n/v/d, abdominal pain, urinary symptoms, weakness or syncope were reported. Limited additional history available. Per patient's niece the patient was sedentary at home before admission to the hospital.     Patient was intubated on 10/27/21. Extubated following successful SBT on 11/9/21. Status post bronchoscopy with mucous plug removal from the left mainstem on 11/13. Failed HFNC and was reintubated on 11/15/21. Tracheostomy tube was placed on 11/17/21. She is having shortness of breath: No  Functional status prior to beginning of symptoms: 0 block/s on level ground. Current functional capacity on level ground: 0 block/s on level ground. She can climb steps: No  Flights of steps she can climb: 0    She is having cough: Yes  Duration of cough: for 30+ days. Her cough is associated with sputum production: Yes   The sputum color: white  Hemoptysis:Yes    She is having chest pain:No      Past 24 hrs   -The patient tolerated T piece well yesterdsay til night, she was switched to PCV at night. -CXR this morning shows collapse left lung, most likely secondary to mucous plug.  -Still very weak. -No fever or chills. -S/p right-sided chest tube, did not notice air leak while I was there.   All other systems reviewed    PMHx   Past Medical History      Diagnosis Date    CHF (congestive heart failure) ([facility])     Dr. Homero Post Depression     Gout attack     Hypertension     Osteoarthritis     Pulmonary artery hypertension ([facility] [address]     [facility] Dr. Tito Domingo-- Dr. Homero Post Renal calculi     Dr. Ag Archibald Thrombocytopenia [facility])       Past Surgical History        Procedure Laterality Date    APPENDECTOMY  1960    BRONCHOSCOPY N/A 11/22/2021    BRONCHOSCOPY performed by Gil Ruano MD at [facility]   [address] [place] [address]    IR [address]  11/26/2021    IR CHEST TUBE INSERTION 11/26/2021 [facility]ED[facility]    IR NEPHROSTOMY PERCUTANEOUS LEFT  11/1/2021    IR NEPHROSTOMY PERCUTANEOUS LEFT 11/1/2021 Irene Leos MD [facility] PROCED[facility]    PRESSURE ULCER DEBRIDEMENT Right 8/6/2021    EXCISION RIGHT BUTTOCK WOUND AND CLOSURE performed by Felisa Alba MD at [facility]    Current Medications    midazolam  4 mg IntraVENous Once    ketamine  50 mg IntraVENous Once    lidocaine PF  20 mL IntraDERmal Once    metoprolol tartrate  12.5 mg Oral BID    sodium chloride flush  5-40 mL IntraVENous 2 times per day    metoclopramide  5 mg IntraVENous Q8H    sodium chloride (Inhalant)  4 mL Nebulization Q6H    And    albuterol  2.5 mg Nebulization Q6H    lactobacillus  1 capsule Per NG tube Daily with breakfast    piperacillin-tazobactam  3,375 mg IntraVENous Q12H    chlorhexidine  15 mL Mouth/Throat BID    insulin lispro  0-6 Units SubCUTAneous Q6H    allopurinol  100 mg Oral Daily    phosphorus replacement protocol   Other RX Placeholder    potassium bicarb-citric acid  40 mEq Oral Once    tiotropium-olodaterol  2 puff Inhalation Daily    [Held by provider] aspirin  81 mg Oral Daily    pantoprazole  40 mg IntraVENous QAM    sodium hypochlorite   Irrigation Daily    Riociguat  2.5 mg Oral Q8H    FLUoxetine  40 mg Oral Daily     sodium chloride flush, sodium chloride, LORazepam, lidocaine PF, ondansetron, albuterol, fentanNYL, glucose, dextrose, glucagon (rDNA), dextrose, polyethylene glycol, senna, acetaminophen  IV Drips/Infusions   sodium chloride      dextrose Stopped (11/25/21 0336)    [Held by provider] sodium chloride 50 mL/hr at 10/28/21 2314     Home Medications  Medications Prior to Admission: metoprolol tartrate (LOPRESSOR) 25 MG tablet, Take 0.5 tablets by mouth 2 times daily  sodium hypochlorite (DAKINS) 0.125 % SOLN external solution, Apply Dakin's moistened gauze dressings to wound twice daily and as needed.   sodium chloride 1 g tablet, Take 1 tablet by mouth 2 times daily  allopurinol (ZYLOPRIM) 300 MG tablet, Take 1 tablet by mouth daily  FLUoxetine (PROZAC) 40 MG capsule, Take 1 capsule by mouth daily  potassium chloride (KLOR-CON M) 10 MEQ extended release tablet, Take 1 tablet by mouth every other day  bumetanide (BUMEX) 1 MG tablet, Take 1 tablet by mouth daily  Multiple Vitamins-Minerals (MULTIVITAMIN WOMEN PO), Take 1 tablet by mouth daily  acetaminophen (TYLENOL) 650 MG extended release tablet, Take 650 mg by mouth every 8 hours as needed for Pain  Riociguat (ADEMPAS) 2.5 MG TABS, Take 2.5 mg by mouth 3 times daily  docusate sodium (COLACE) 100 MG capsule, Take 100 mg by mouth as needed   aspirin 81 MG tablet, Take 81 mg by mouth daily   Diet    ADULT TUBE FEEDING; PEG; Renal Formula; Continuous; 30; No; 240; Q 8 hours  Allergies    Patient has no known allergies. Social History     Social History     Socioeconomic History    Marital status: Single     Spouse name: Not on file    Number of children: 0    Years of education: Not on file    Highest education level: Not on file   Occupational History    Not on file   Tobacco Use    Smoking status: Never Smoker    Smokeless tobacco: Never Used   Vaping Use    Vaping Use: Never used   Substance and Sexual Activity    Alcohol use: No    Drug use: No    Sexual activity: Not Currently   Other Topics Concern    Not on file   Social History Narrative    Not on file     Social Determinants of Health     Financial Resource Strain: Low Risk     Difficulty of Paying Living Expenses: Not very hard   Food Insecurity: No Food Insecurity    Worried About Running Out of Food in the Last Year: Never true    Traci of Food in the Last Year: Never true   Transportation Needs:     Lack of Transportation (Medical): Not on file    Lack of Transportation (Non-Medical):  Not on file   Physical Activity:     Days of Exercise per Week: Not on file    Minutes of Exercise per Session: Not on file   Stress:     Feeling of Stress : Not on file   Social Connections: distances: NO  History of exposure to birds, pigeons, or chickens in the past:NO    History of pulmonary embolism in the past: No            History of DVT in the past:No              Vitals     height is 4' 9\" (1.448 m) and weight is 167 lb 1.7 oz (75.8 kg). Her oral temperature is 99.2 °F (37.3 °C). Her blood pressure is 120/58 (abnormal) and her pulse is 95. Her respiration is 24 and oxygen saturation is 98%. Body mass index is 36.16 kg/m². SUPPLEMENTAL O2: O2 Flow Rate (L/min): 10 L/min     I/O        Intake/Output Summary (Last 24 hours) at 11/28/2021 1047  Last data filed at 11/28/2021 0318  Gross per 24 hour   Intake 1362.62 ml   Output 349 ml   Net 1013.62 ml     I/O last 3 completed shifts: In: 1413.3 [P.O.:20; NG/GT:1295; IV Piggyback:98.3]  Out: 349 [Urine:225; Stool:100; Chest Tube:24]   Patient Vitals for the past 96 hrs (Last 3 readings):   Weight   11/28/21 0215 167 lb 1.7 oz (75.8 kg)   11/27/21 0415 168 lb 3.2 oz (76.3 kg)   11/26/21 1148 166 lb 7.2 oz (75.5 kg)       Exam   Physical Exam   Constitutional: No distress on vent via trach. Patient appears moderately built. Head: Normocephalic and atraumatic. Mouth/Throat: Oropharynx is clear and moist.  No oral thrush. Eyes: Conjunctivae are normal. Pupils are equal, round. No scleral icterus. Neck: Neck supple. 7.5 mm Bivona portex in place  Cardiovascular: S1 and S2 with no murmur. No peripheral edema  Pulmonary/Chest: Normal effort with bilateral air entry, clear breath sounds. No stridor. No respiratory distress. Patient exhibits no tenderness. Abdominal: Soft. Bowel sounds audible. No distension or tenderness to palp.    Musculoskeletal: Moves all extremities  Neurological: Patient is alert and follows simple commands      Labs  - Old records and notes have been reviewed in [facility]   ABG  Lab Results   Component Value Date    PH 7.36 11/13/2021    PO2 59 11/13/2021    PCO2 45 11/13/2021    HCO3 26 11/13/2021    O2SAT 89 11/13/2021 Lab Results   Component Value Date    IFIO2 70 11/13/2021    MODE PC 11/05/2021    SETPEEP 8.0 11/15/2021     CBC  Recent Labs     11/26/21  1340 11/27/21  0420 11/28/21  0320   WBC 7.1 7.3 7.3   RBC 2.79* 2.66* 2.66*   HGB 8.4* 7.9* 7.9*   HCT 26.0* 25.3* 25.5*   MCV 93.2 95.1 95.9   MCH 30.1 29.7 29.7   MCHC 32.3 31.2* 31.0*    213 242   MPV 9.1* 8.7* 8.9*      BMP  Recent Labs     11/26/21  0423 11/27/21  0420 11/28/21  0320   * 134* 131*   K 4.0 3.9 3.7   CL 93* 99 96*   CO2 22* 26 26   BUN 54* 30* 42*   CREATININE 3.5* 2.5* 2.9*   GLUCOSE 106 113* 107   MG 1.8 1.7 1.6   CALCIUM 8.4* 8.4* 8.4*     LFT  No results for input(s): AST, ALT, ALB, BILITOT, ALKPHOS, LIPASE in the last 72 hours. Invalid input(s): AMYLASE  TROP  Lab Results   Component Value Date    TROPONINT 0.084 10/28/2021    TROPONINT 0.059 10/27/2021    TROPONINT 0.037 03/06/2020     BNP  No results for input(s): BNP in the last 72 hours. Lactic Acid  No results for input(s): LACTA in the last 72 hours. INR  No results for input(s): INR, PROTIME in the last 72 hours. PTT  No results for input(s): APTT in the last 72 hours. Glucose  Recent Labs     11/27/21  1739 11/28/21  0035 11/28/21  0531   POCGLU 117* 112* 110*     UA No results for input(s): SPECGRAV, PHUR, COLORU, CLARITYU, MUCUS, PROTEINU, BLOODU, RBCUA, WBCUA, BACTERIA, NITRU, GLUCOSEU, BILIRUBINUR, UROBILINOGEN, KETUA, LABCAST, LABCASTTY, AMORPHOS in the last 72 hours. Invalid input(s): CRYSTALS.     PFTs           Sleep studies   Study Results  Initial Study Date -  11/7/19  AHI -  5.8   TotalEvents - 32  (Apneas  19  Hypopneas 13  Central  0)  LM w/Arousals - 0  Sleep Efficiency - 70.8 % (Total Sleep Time - 330 min)  Time with Sats below 88% - 0 min    Cultures    -Anaerobic and aerobic culture: (From Coccyx) Positive for Proteus mirabilis and Pseudomonas aeruginosa, culture also yielded very light growth of Staphylococcus species (coagulase negative), and wall thickness. Pericardial Effusion   The pericardium was normal in appearance with a small, non-hemodynamically   significant pericardial effusion. Prominent pericardial fat pad. Pleural Effusion   No evidence of pleural effusion. Aorta / Great Vessels   IVC size is dilated with reduced respiratory phasic changes (CVP~10-15   mmHg). Radiology    CXR  PORT CXR 11/19/21:  There is a Dobbhoff tube which projects over the stomach. There is a left-sided percutaneous nephrostomy tube, stable compared to prior CT. 1. Mild hepatomegaly. Left subclavian dialysis catheter with tip at cavoatrial junction. NG tube passes into stomach. Tracheostomy tube in good position. Right jugular line with catheter tip in right atrium. 2. Tiny bilateral pleural effusions. Moderate pneumonia/pulmonary edema scattered relatively diffusely in both lungs. 3. Overall appearance of chest has worsened somewhat since prior.       CT Scans  (See actual reports for details)    CT chest without contrast on 11/1/21: There are moderate bilateral pleural effusions which have mildly increased in size in the interval. There is adjacent atelectasis. Pulmonary vessels are prominent, similar to prior exam. There are groundglass opacities adjacent to the pulmonary vessels. The heart is enlarged, similar to prior exam. There is a small pericardial effusion, decreased compared to prior exam. Redemonstration of percutaneous cardiac pacer leads and enteric tube. The endotracheal tube is stable with tip in the distal trachea. There is marked thyromegaly which is nodular in appearance, stable compared to prior exam. Visualized upper abdominal solid organs are grossly unremarkable. There are stable degenerative changes of the thoracic spine with exaggerated thoracic kyphosis.         Assessment   - Left lung collapse secondary to mucous plug.  -Hemoptysis in the setting of tracheostomy tube--resolved  -Acute hypoxic respiratory failure secondary to severe multi-organism pneumonia with bilateral pleural effusion and repeated mucous plugging   Intubated 10/27/21, extubated 11/9/21, reintubated 11/15/21, tracheostomy tube placed 11/17/21  -Right-sided pneumothorax. Status post chest tube 11/26/2021  -S/p bronchoscopy with mucous plug removal from the left mainstem on 11/13/21 by Dr. Yady Reyes in ICU  -Bilateral pleural effusions   -Severe bilateral multiorganism pneumonia: received 14 days of vancomycin, 5 days of rocephin, clinidamycin for 8 days   ANCA associated vasculitis: elevated anti MPO elevated 416 and serine proteinase 3 IgG WNL  -Stage II ALVIN on CKD 3 on HD started 11/20/21  -Hydronephrosis 2/2 Left-sided staghorn calculi  -Acute blood loss anemia: PRBC x1 transfused 10/31/21, 11/4/21, 11/5/21, x2 11/6/21, x3 11/8/21, x3 11/15/21. Heparin stopped (dialysis dose last ran on 11/11/21). -Thrombocytopenia   -Hematuria   -BARBER noncompliant with CPAP     Plan   -CXR showed left lung collapse, will proceed with theraputic and diagnostic bronchoscopy. - Will send specimen for culture. - Keep the patient on vent today. - Start metaneb  -Keep chest tube to suction. -Aspiration precautions   -Cautious use of tracheal suction to reduce risk of suction trauma   -Wean FiO2 as tolerated to maintain saturation above 90%. Questions and concerns addressed.     Electronically signed by   Johan Almaraz MD on 11/28/2021 at 10:47 AM

## 2021-11-28 NOTE — PROGRESS NOTES
Hospitalist Progress Note    Patient:  Celina Paredes    YOB: 1950  Unit/Bed:4K-12/012-A  MRN: [id number]    Acct: [de-identified]   PCP: Ghassan Walden MD    Date of Admission: 10/27/2021      Assessment/Plan:    1. 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 Intubated 10/27/21. Extubated following successful SBT on 11/9/21. Failed HFNC and was reintubated on 11/15/21. Tracheostomy placed 11/17/21.                - Pulmonology following               - Wean as tolerated              - Lung protective strategies               - Aspiration precautions        Worsening Left Lung opacity/complete opacification on 11/28 CXR (relatively asymptomatic)   -Concern for recurrent mucus plug. -D/w Pulmonary - s/p bronchoscopy with mucus plug in left mainstem bronchus   -Cultures and pneumonia panel obtained, patient to remain on vent till tomorrow am   -Chest physiotherapy? Will need to wait since she has a pneumothorax and pigtail in place. 2. Pneumothorax on Right - 20% in size:  Likely 2/2 PPV. Chest XR on 11/26 showed stable appearance of right pneumothorax of about 10% as measured on the AP radiograph. Subsequent CT chest showed multiloculated pneumothorax right side approximately 20% in size. - Chest tube was place by IR on the right on 11/26              - Repeat chest XR 11/27 reveals improvement in pneumothorax    - Will continue to monitor. CXR daily. Reduce PPV as able. - Chest tube on suction, continue for now since remains on ventilation and s/p left mucus plug clearing (although CXR this am shows resolution of PTX, and no bubbles in chamber)  3. 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 Nephrology following. CRRT 11/5/21 until 11/12/21.  Anti-DS-DNA negative, Anti histone negative, Anti Banks,and  Anti-Savana WNL, GBM antibodies negative, C3/C4 levels normal, elevated kappa/lambda free light chains with normal ratio. HD started 11/20/21. Positive for ANCA associated vasculitis. ANCA associated Abs show elevated anti MPO elevated 416 and serine proteinase 3 IgG WNL              - Plans for biopsy per nephrology when stable              - CVVH stopped 11/15/21 significant oliguria since that time              - Hemodialysis on 11/26 done with good response    - Continue intermittent hemo per Nephrology. 4. Dysphagia: 2/2 prolonged intubation. PEG tube placed on 11/24/21. Protonix daily   5. Hemoptysis: (resolved): Bronchoscopy on 11/22 remarkable for old blood stained mucus in RLL. No active bleed. - Pulm following, appreciate recs                -  S/p Jose Alfredo nebs  6. Acute blood loss anemia with chronic normocytic anemia: 2/2 hematuria s/p perc tube placement w/ Iron studies consistent w/ MERCEDES vs Anemia chronic disease. B12 WNL, Folate low, Abs reticulocyte index 11/18/21 0.49% Soluable transferrin 139. Calculated Iron deficit 827mg. PRBC x1 transfused 10/31/21, 11/4/21, 11/5/21, x2 11/6/21, x3 11/8/21, x3 11/15/21.               - Heparin stopped              - Remains stable. Consider venofer when infections clear  7. Stage IV decubitus ulcer w/ suspected osteomyelitis: S/p excision VAC placement 8/21 Stage III on admission now progressed to stage IV 2/2 to prolonged bedrest and malnutrition. Probes to bone, necrosis noted around wound site. CT abdomen and pelvis showing possible mi involvement with concern for osteomyelitis. Seen by general surgery on 11/17/21. No indications for debridement at the time. Poor candidate for surgery given multiple co-morbidities, malnutrition and poor wound healing. Wound cultures from 11/19 growing proteus mirabilis and pseudomonas aeruginosa. Wound care following, continue Dakins w/ dressing changes   - Rectal tube in place, continue     - Remains on IV Zosyn, now day 11, plan for 14 days.   May need chronic supressive therapy - consider ID consult to follow up in wound clinic and for long term antibiotic recommendations. 8. Hypokalemia: Resolved. 3.4 on 11/24. Potassium replacement protocol in place. 9. Hyponatremia: 124 on 11/23 in setting of ALVIN stage III on CKD. Monitor daily. 131 today. 10. Hematuria: s/p percutaneous tube placement. CBI per above.  Stopped heparin, holding ASA. 11. ANCA associated Vasculitis: workup per above. Will need Renal Bx for confirmation once stable. Discussed case w/ Nephrology. Completed pulse dose steroids starting on 11/8/21 w/ 10mg/kg methylprednisolone for 3 days. SSI started for coverage. Not candidate for Rituxin/TPE 2/2 existing infection. 12. Bilateral Pleural Effusion:   Associated volume overload in context of renal failure. Interval enlargement noted on CT 11/1/21. Currently on CVVH. -Still present on the right, will continue to monitor but no plan for drainage given recent PTX. Difficult to assess the left lung. 13. Hydronephrosis: 2/2 large staghorn calculi. S/p nephrostomy tube placement on left still some bloodstained discharge. Remains in. Needs f/up with urology. 14. Suspected COPD: current every day smoker.  Obstructive process noted on flow volume loop on ventilator. Started empiric therapy with Stiolto. Recommend formal PFT once improved  15. PAH/chronic RV failure NYHA II: EF 55 to 60% G2 DD TTE 5/4/2021. RHC showed [place] 30 with elevated PCWP. Treated w/ Riociguat.      -holding riciguat since not studied in renal insufficiency (crcl <15)   -will need to reach out to [facility] HTN specialist Monday(Dr. Leal)  16. Chronic tobacco use:  cessation  17. Gout: Continue allopurinol  18. BARBER: non compliant w/ CPAP. Now trached. 19. Septic Shock: Resolved. 2/2 severe PNA now concern for necrotizing wound on sacrum. Initial CI 6.3 w/ NE precludes concern for septic shock NE d/c 2/2 worsening afib RVR  20. Thrombocytopenia: Resolved.  Suspect consumptive process in context of sepsis. Not on heparin    21. Severe bilateral multiorganism pneumonia: Resolved. PCR positive Staph w/ MECA gene and adenovirus consistent w/ MRSA colonization. Staph completed 14 days Vancomycin (started 10/28/21) and completed 5 days Rocephin. Clindamycin started for coverage of PVL as patient clinically worsening following 14 days Vancomycin for MRSA coverage. Underwent bronchoscopy for left mainstem mucus plug noted CXR on 11/11/21. Switched to Clindamycin for coverage of PVL received 8 days therapy (started 11/11/21). PNA panel from 11/15/21 negative. Repeat cultures show no growth        Expected discharge date:  tbd    Disposition: [place] is plan  [] Home  [] TCU  [] Rehab  [] Psych  [] SNF  [] [place]  [] Other-    ===================================================================      Chief Complaint: SOB    Hospital Course: Per HPI, \"47F admitted to [facility] 10/27/21 w/ SOB. Current smoker w/ PMH PAH grp 3, HFpEF, stage III sacral ulcer s/p wound ostomy 9/21. Patient sister reports SpO2 51% at home and was brought to ED where ABG showed pH 7.19, PCO2 80, PO2 134. She required emergent intubation and was transferred to ICU. Workup revealed left sided pleural effusion and underwent US guided thoracentesis w/ 1100 cc removed consistent w/ MRSA/adenovirus. Patient was noted to be in afib/RVR and was placed on amio, heparin drip. Patient was also noted to have normocytic anemia and received 1 unit PRBC 10/31/21. CT abdomen revealed CBD dilation w/ cholelithiasis.      11/01/2021: Plan for nephrostomy tubes today. Hemoglobin 7.7 s/p 1 unit PRBC yesterday.  Weaned to 4 mics Levophed. 11/02/2021: Patient resumed back on prior dose of vancomycin. Low urine output w/ increasing pressor requirements today  11/03/2021: Will attempt SBT if able to wean today. Increased Amio drip 2/2 worsening tachycardia. Continued hematuria w/ low urine output. Cr stable.  Will evaluate UTI following perc tube placement. Rise in WBC noted. Culture pending  11/04/2021: Cardioversion on 11/3/2021.  Now and NSR. CVP 29 this a.m. Suspect drop in CI 2/2 to stopping levophed. Diffuse edema following transfusion overnight. Given one-time dose of 2 mg Bumex and albumin. SBT this AM  11/05/2021: Failed SBT yesterday. Continued low urine output. Trial of Bumex today. Will proceed with dialysis if fails to improve. 11/06/2021: Started on CRRT, continues to have bloody drainage of nephrostomy tube given 1 unit PRBC, cefipime emperically pending cultures of nephrostomy tube fluid and repeat resp cultures. 11/07/21: Patient remains clinically well on exam. We'll continue medical of UF with CRRT.  On empiric cefepime for coverage of perinephric abscess noted on CT abdomen and pelvis November 4, 2021.  White count trending down. Beatrice Tracey attempt spontaneous breathing trial in a.m.  11/08/21: Transfused 1x PRBC this AM. Anemia workup pending. WBC trending down. Patient ventilator settings this AM preclude SBT. Volume overloaded on exam. UF increased to 125 cc/hr this AM. Will attempt to wean vent settings further further. Coag neg staph growing on nephrostomy tube aspirate. Suspect contamination. Continue existing ABx  11/09/21: Pulse dose steroids initiated overnight. Started on SSI. Urine output now improving 75cc/hr. Transfused 3 units PRBCs yesterday for Improve DO2. MERCEDES noted w/ 875 mg deficit. Will replace once steroids/ABx completed. 11/10/21: Extubated overnight. SLP eval today. Continue BiPAP while asleep w/ transition to NC while awake as tolerated. No tachypnea. Day 3 pulse dose steroids. Continued bloody drainage from nephrostomy tube. Holding [facility]   11/11/21: Worsening bilateral infiltrates with SpO2 <90% on HFNC. Continued pulmonary hygiene w/ nebulized hypertonic saline, acapella and breathing treatments in addition to deep suctioning. Continued on Vancomycin for MRSA PNA.  Plan for family meeting this AM to discuss goals of care. 11/12/21: Follow-up discussion from family meeting. Patient nodded agreement with full code status including reintubation leading to tracheostomy and PEG placement and continued hemodialysis if needed. Patient nodded agreement and understanding with these decisions. CXR yesterday evening did show left mainstem mucus plugging with cutoff sign and absent breath sounds and did undergo emergent bronchoscopy overnight on 11/12/21. Diminished breath sounds and worsening respiratory status this AM concerning for continued plugging. Stat chest X-ray ordered for confirmation. 11/13/21: Respiratory status continues to decline, she is requiring BiPAP with FiO2 of 70%.  Chest x-ray shows complete opacification of the left lung secondary to mucous plugging, also the patient has left-sided pleural effusion.  Plan for bronchoscopy. 11/15/2021: Family meeting today per event note. Reintubated today after failed BiPAP  11/16/2021: will dc abx tomorrow. PNA panel negative. Plan for tracheostomy time permitting today. Weaning phenylephrine as tolerated. 11/17/2021: Necrotic Stage IV pressure ulcer probing to bone noted this AM. CT abdomen and pelvis pending to evaluate for abscess. Will discuss with family today when available. 11/18/2021: CT showed bony involvement of sacral ulcer w/ concern for osteomyelitis. Wound ostomy following. Culture pending. Started on broad spectrum therapy with Vancomycin and Zosyn for coverage of suspect osteomyelitis. Weaning sedation as tolerated  11/19/21: Wound culture pending. On broad spectrum ABx for coverage of osteomyelitis. Will de-escalate pending culture/sensitiivity. Consult to GI for PEG tube evaluation.   11/21/21: Tolerated conventional HD on 11/20/21 w/ 2500cc removed. Wound culture growing gram negative bacilli. Vanc discontinued. Weaned off phenylephrine. Will monitor overnight.  Okay to transfer to step down in AM  11/22/21: FiO2 30%, rate of 8 L/min. Remains on tube feeds on day 5 Zosyn. Bronchoscopy for hemoptysis unremarkable findings of old bloody residual likely from trach procedure. 11/23/21: Remains on tube feeds on day 6 Zosyn. Creatinine bumped up to 3.5 today with Na low at 124. Nephrology performed HD today with tunneled cath next. GI planning for PEG tube placement tomorrow. 11/24/21: 7-day Zosyn completed. PEG tube placed by GI.  11/25/21: Labs looking a little worse, will be due for another hemodialysis session tomorrow. Otherwise well. 11/26/21: Medial pneumothorax noted on Chest XR in the AM, subsequent CT chest confirmed 20% being the size. Patient was taken down to IR for chest tube placement which was successful. Attached to suction -20 mmHg. Patient also underwent hemodialysis today. Overall, still with a lot going on. Was told that patient is #2 in [facility] for a bed at [facility] but given her ongoing acuity of care\"     11/28: required bronchoscopy as there was left lung opacification resulting from recurrent mucus plug. Subjective (past 24 hours): Patient seen at bedside. She is anxious (reports afraid of heights, clinging to siderails of bed). She otherwise denies any breathlessness, worsening of her cough - still lots of secretions suctioned per RN. No fevers, chills. Does report some knee pain in the right knee, which is her prior surgically repaired knee.         Medications:  Reviewed    Infusion Medications    sodium chloride      dextrose Stopped (11/25/21 7807)    [Held by provider] sodium chloride 50 mL/hr at 10/28/21 6016     Scheduled Medications    midazolam  4 mg IntraVENous Once    ketamine  50 mg IntraVENous Once    lidocaine PF  20 mL IntraDERmal Once    metoprolol tartrate  12.5 mg Oral BID    sodium chloride flush  5-40 mL IntraVENous 2 times per day    metoclopramide  5 mg IntraVENous Q8H    sodium chloride (Inhalant)  4 mL Nebulization Q6H    And    albuterol  2.5 mg Nebulization Q6H    lactobacillus  1 capsule Per NG tube Daily with breakfast    piperacillin-tazobactam  3,375 mg IntraVENous Q12H    chlorhexidine  15 mL Mouth/Throat BID    insulin lispro  0-6 Units SubCUTAneous Q6H    allopurinol  100 mg Oral Daily    phosphorus replacement protocol   Other RX Placeholder    potassium bicarb-citric acid  40 mEq Oral Once    tiotropium-olodaterol  2 puff Inhalation Daily    [Held by provider] aspirin  81 mg Oral Daily    pantoprazole  40 mg IntraVENous QAM    sodium hypochlorite   Irrigation Daily    Riociguat  2.5 mg Oral Q8H    FLUoxetine  40 mg Oral Daily     PRN Meds: sodium chloride flush, sodium chloride, LORazepam, lidocaine PF, ondansetron, albuterol, fentanNYL, glucose, dextrose, glucagon (rDNA), dextrose, polyethylene glycol, senna, acetaminophen      ROS: reviewed from prior note, full ROS unchanged unless otherwise stated in hospital course/subjective portion. Intake/Output Summary (Last 24 hours) at 11/28/2021 1306  Last data filed at 11/28/2021 0318  Gross per 24 hour   Intake 1362.62 ml   Output 349 ml   Net 1013.62 ml       Exam:  BP (!) 110/54   Pulse 78   Temp 98.2 °F (36.8 °C) (Oral)   Resp 18   Ht 4' 9\" (1.448 m)   Wt 167 lb 1.7 oz (75.8 kg)   SpO2 100%   BMI 36.16 kg/m²     General appearance: Acute on chronically ill appearing, anxious appearing. Obese  Eyes:  PERRL. Conjunctivae/corneas clear. HENT: Head normal appearing. Nares normal. Oral mucosa moist.  Hearing intact. Neck: Supple, with full range of motion. Trachea midline. No gross JVD appreciated. Respiratory:  Mechanical breath sounds with significantly diminished left lung sounds, and rhonchorous right lung. No wheezing. Cardiovascular: Normal rate, regular rhythm with normal S1/S2 without murmurs. No lower extremity edema. Abdomen: Soft, non-tender, non-distended with normal bowel sounds. Protuberant. There is nephrostomy tube in the left abdomen.    Musculoskeletal: No joint swelling or tenderness. Normal tone. No abnormal movements. Skin: Warm and dry. No rashes or lesions. Large sacral ulcer stage 4 with skin breakdown and maceration/necrosis on the inferolateral right aspect. Neurologic:  No focal sensory/motor deficits in the upper or lower extremities. Cranial nerves:  grossly non-focal 2-12. Psychiatric: Alert and responsive, she is able to mouth her responses, thus difficult to assess full orientation. Capillary Refill: Brisk,< 3 seconds. Peripheral Pulses: +2 palpable, equal bilaterally. Labs:   Recent Labs     11/26/21  1340 11/27/21  0420 11/28/21  0320   WBC 7.1 7.3 7.3   HGB 8.4* 7.9* 7.9*   HCT 26.0* 25.3* 25.5*    213 242     Recent Labs     11/26/21  0423 11/27/21  0420 11/28/21  0320   * 134* 131*   K 4.0 3.9 3.7   CL 93* 99 96*   CO2 22* 26 26   BUN 54* 30* 42*   CREATININE 3.5* 2.5* 2.9*   CALCIUM 8.4* 8.4* 8.4*     No results for input(s): AST, ALT, BILIDIR, BILITOT, ALKPHOS in the last 72 hours. No results for input(s): INR in the last 72 hours. No results for input(s): Martha Castor in the last 72 hours. No results for input(s): PROCAL in the last 72 hours. Lab Results   Component Value Date    NITRU NEGATIVE 11/03/2021    WBCUA 10-15W/CLUMPS 11/03/2021    BACTERIA FEW 11/03/2021    RBCUA > 200 11/03/2021    BLOODU LARGE 11/03/2021    SPECGRAV 1.024 11/03/2021    GLUCOSEU NEGATIVE 03/06/2020       Radiology (48 hours):  CT CHEST WO CONTRAST    Result Date: 11/26/2021  1. Small effusion right side. Moderate size effusion left side. Moderate atelectasis/pneumonia both mid and lower lung fields. Findings of pulmonary arterial hypertension. 2. Multiloculated pneumothorax right side, approximately 20% in size. 3. Nephrostomy tube left side. Relatively large staghorn calculus left kidney. **This report has been created using voice recognition software.   It may contain minor errors which are inherent in voice recognition technology. ** Final report electronically signed by Dr. Claryce Hodgkins on 11/26/2021 3:10 PM    XR CHEST PORTABLE    Result Date: 11/28/2021  Interval loss of volume of the left lung possibly due to mucous plugging. Complete opacification of the left lung. **This report has been created using voice recognition software. It may contain minor errors which are inherent in voice recognition technology. ** Final report electronically signed by Dr. Gus Aguillon on 11/28/2021 1:02 AM    XR CHEST PORTABLE    Result Date: 11/27/2021  1. There are scattered infiltrates throughout both lungs which are similar to the prior study. 2. There is a persistent tiny right apical pneumothorax. 3. Moderate left and small right-sided pleural effusions. **This report has been created using voice recognition software. It may contain minor errors which are inherent in voice recognition technology. ** Final report electronically signed by Dr Neptali Foss on 11/27/2021 1:32 AM    XR CHEST PORTABLE    Result Date: 11/26/2021  1. Suboptimal inflation of lungs. Mild Chris. Tracheostomy tube. Right jugular line and left subclavian dialysis catheter, both with tips at cavoatrial junction. Small caliber chest tube has been inserted in the pleural space right side. Tiny residual less than 5% pneumothorax right apex. 2. Questionable tiny effusion right side. Moderate-sized pleural effusion left side. Mild hazy appearance both lungs relatively diffusely, consistent with mild interstitial pneumonia/edema/fibrosis. Overall appearance of chest has improved somewhat following insertion of the chest tube. **This report has been created using voice recognition software. It may contain minor errors which are inherent in voice recognition technology. ** Final report electronically signed by Dr. Claryce Hodgkins on 11/26/2021 4:50 PM    IR CHEST TUBE INSERTION    Result Date: 11/26/2021  Status post successful chest tube insertion for evacuation of a pneumothorax. **This report has been created using voice recognition software. It may contain minor errors which are inherent in voice recognition technology. ** Final report electronically signed by Dr. Gena Ngo on 11/26/2021 4:44 PM       DVT prophylaxis:    [] Lovenox  [x] SCDs  [] SQ Heparin  [] Encourage ambulation   [] Already on Anticoagulation       Diet: ADULT TUBE FEEDING; PEG; Renal Formula; Continuous; 30; No; 240; Q 8 hours  Code Status: Full Code  PT/OT: yes  Tele: yes  IVF: on hold.      Electronically signed by Aurora Meza DO on 11/28/2021 at 1:06 PM

## 2021-11-28 NOTE — PROGRESS NOTES
Order was in the chart for bronchoscopy procedure. Verified that consent was signed. Time-out was done. ICU disposable  mobile scope  was used. Assisted Dr. Dmitriy Spencer with bronchoscopy procedure. Bronchial washings were obtained. Patient tolerated the procedure well. The bronchoscope was disposed of in a red bag and placed in dirty utility room.

## 2021-11-29 ENCOUNTER — APPOINTMENT (OUTPATIENT)
Dept: GENERAL RADIOLOGY | Age: 71
DRG: 004 | End: 2021-11-29
Payer: MEDICARE

## 2021-11-29 LAB
ANION GAP SERPL CALCULATED.3IONS-SCNC: 11 MEQ/L (ref 8–16)
BASOPHILS # BLD: 1.2 %
BASOPHILS ABSOLUTE: 0.1 THOU/MM3 (ref 0–0.1)
BUN BLDV-MCNC: 51 MG/DL (ref 7–22)
CALCIUM IONIZED: 1.23 MMOL/L (ref 1.12–1.32)
CALCIUM SERPL-MCNC: 8.2 MG/DL (ref 8.5–10.5)
CHLORIDE BLD-SCNC: 96 MEQ/L (ref 98–111)
CO2: 25 MEQ/L (ref 23–33)
CREAT SERPL-MCNC: 3.5 MG/DL (ref 0.4–1.2)
EOSINOPHIL # BLD: 4.1 %
EOSINOPHILS ABSOLUTE: 0.2 THOU/MM3 (ref 0–0.4)
ERYTHROCYTE [DISTWIDTH] IN BLOOD BY AUTOMATED COUNT: 16.8 % (ref 11.5–14.5)
ERYTHROCYTE [DISTWIDTH] IN BLOOD BY AUTOMATED COUNT: 59 FL (ref 35–45)
GFR SERPL CREATININE-BSD FRML MDRD: 13 ML/MIN/1.73M2
GLUCOSE BLD-MCNC: 102 MG/DL (ref 70–108)
GLUCOSE BLD-MCNC: 108 MG/DL (ref 70–108)
GLUCOSE BLD-MCNC: 112 MG/DL (ref 70–108)
GLUCOSE BLD-MCNC: 118 MG/DL (ref 70–108)
GLUCOSE BLD-MCNC: 98 MG/DL (ref 70–108)
GLUCOSE BLD-MCNC: 98 MG/DL (ref 70–108)
HCT VFR BLD CALC: 24.4 % (ref 37–47)
HEMOGLOBIN: 7.4 GM/DL (ref 12–16)
IMMATURE GRANS (ABS): 0.11 THOU/MM3 (ref 0–0.07)
IMMATURE GRANULOCYTES: 1.8 %
LYMPHOCYTES # BLD: 10.9 %
LYMPHOCYTES ABSOLUTE: 0.7 THOU/MM3 (ref 1–4.8)
MAGNESIUM: 1.8 MG/DL (ref 1.6–2.4)
MCH RBC QN AUTO: 29.2 PG (ref 26–33)
MCHC RBC AUTO-ENTMCNC: 30.3 GM/DL (ref 32.2–35.5)
MCV RBC AUTO: 96.4 FL (ref 81–99)
MONOCYTES # BLD: 8.9 %
MONOCYTES ABSOLUTE: 0.5 THOU/MM3 (ref 0.4–1.3)
NUCLEATED RED BLOOD CELLS: 0 /100 WBC
PLATELET # BLD: 239 THOU/MM3 (ref 130–400)
PMV BLD AUTO: 9.1 FL (ref 9.4–12.4)
POTASSIUM REFLEX MAGNESIUM: 3.6 MEQ/L (ref 3.5–5.2)
RBC # BLD: 2.53 MILL/MM3 (ref 4.2–5.4)
SEG NEUTROPHILS: 73.1 %
SEGMENTED NEUTROPHILS ABSOLUTE COUNT: 4.4 THOU/MM3 (ref 1.8–7.7)
SODIUM BLD-SCNC: 132 MEQ/L (ref 135–145)
WBC # BLD: 6 THOU/MM3 (ref 4.8–10.8)

## 2021-11-29 PROCEDURE — 6370000000 HC RX 637 (ALT 250 FOR IP): Performed by: INTERNAL MEDICINE

## 2021-11-29 PROCEDURE — 99233 SBSQ HOSP IP/OBS HIGH 50: CPT | Performed by: INTERNAL MEDICINE

## 2021-11-29 PROCEDURE — 85025 COMPLETE CBC W/AUTO DIFF WBC: CPT

## 2021-11-29 PROCEDURE — 2580000003 HC RX 258: Performed by: INTERNAL MEDICINE

## 2021-11-29 PROCEDURE — 94761 N-INVAS EAR/PLS OXIMETRY MLT: CPT

## 2021-11-29 PROCEDURE — 99232 SBSQ HOSP IP/OBS MODERATE 35: CPT | Performed by: INTERNAL MEDICINE

## 2021-11-29 PROCEDURE — 6370000000 HC RX 637 (ALT 250 FOR IP): Performed by: STUDENT IN AN ORGANIZED HEALTH CARE EDUCATION/TRAINING PROGRAM

## 2021-11-29 PROCEDURE — 83735 ASSAY OF MAGNESIUM: CPT

## 2021-11-29 PROCEDURE — 6360000002 HC RX W HCPCS: Performed by: STUDENT IN AN ORGANIZED HEALTH CARE EDUCATION/TRAINING PROGRAM

## 2021-11-29 PROCEDURE — C9113 INJ PANTOPRAZOLE SODIUM, VIA: HCPCS | Performed by: NURSE PRACTITIONER

## 2021-11-29 PROCEDURE — 90935 HEMODIALYSIS ONE EVALUATION: CPT

## 2021-11-29 PROCEDURE — 6360000002 HC RX W HCPCS: Performed by: INTERNAL MEDICINE

## 2021-11-29 PROCEDURE — 2700000000 HC OXYGEN THERAPY PER DAY

## 2021-11-29 PROCEDURE — 99232 SBSQ HOSP IP/OBS MODERATE 35: CPT | Performed by: UROLOGY

## 2021-11-29 PROCEDURE — 94640 AIRWAY INHALATION TREATMENT: CPT

## 2021-11-29 PROCEDURE — 6360000002 HC RX W HCPCS: Performed by: PHARMACIST

## 2021-11-29 PROCEDURE — 6360000002 HC RX W HCPCS: Performed by: NURSE PRACTITIONER

## 2021-11-29 PROCEDURE — 2580000003 HC RX 258: Performed by: STUDENT IN AN ORGANIZED HEALTH CARE EDUCATION/TRAINING PROGRAM

## 2021-11-29 PROCEDURE — 94003 VENT MGMT INPAT SUBQ DAY: CPT

## 2021-11-29 PROCEDURE — 6360000002 HC RX W HCPCS: Performed by: FAMILY MEDICINE

## 2021-11-29 PROCEDURE — 6370000000 HC RX 637 (ALT 250 FOR IP): Performed by: NURSE PRACTITIONER

## 2021-11-29 PROCEDURE — 71045 X-RAY EXAM CHEST 1 VIEW: CPT

## 2021-11-29 PROCEDURE — 82330 ASSAY OF CALCIUM: CPT

## 2021-11-29 PROCEDURE — 6370000000 HC RX 637 (ALT 250 FOR IP): Performed by: FAMILY MEDICINE

## 2021-11-29 PROCEDURE — 36415 COLL VENOUS BLD VENIPUNCTURE: CPT

## 2021-11-29 PROCEDURE — 80048 BASIC METABOLIC PNL TOTAL CA: CPT

## 2021-11-29 PROCEDURE — 2060000000 HC ICU INTERMEDIATE R&B

## 2021-11-29 PROCEDURE — 82948 REAGENT STRIP/BLOOD GLUCOSE: CPT

## 2021-11-29 RX ORDER — HEPARIN SODIUM 1000 [USP'U]/ML
INJECTION, SOLUTION INTRAVENOUS; SUBCUTANEOUS
Status: DISPENSED
Start: 2021-11-29 | End: 2021-11-30

## 2021-11-29 RX ADMIN — Medication 1 CAPSULE: at 10:25

## 2021-11-29 RX ADMIN — SODIUM CHLORIDE, PRESERVATIVE FREE 10 ML: 5 INJECTION INTRAVENOUS at 10:45

## 2021-11-29 RX ADMIN — METOCLOPRAMIDE HYDROCHLORIDE 5 MG: 5 INJECTION INTRAMUSCULAR; INTRAVENOUS at 17:38

## 2021-11-29 RX ADMIN — SODIUM HYPOCHLORITE: 2.5 SOLUTION TOPICAL at 20:50

## 2021-11-29 RX ADMIN — METOCLOPRAMIDE HYDROCHLORIDE 5 MG: 5 INJECTION INTRAMUSCULAR; INTRAVENOUS at 01:00

## 2021-11-29 RX ADMIN — METOCLOPRAMIDE HYDROCHLORIDE 5 MG: 5 INJECTION INTRAMUSCULAR; INTRAVENOUS at 10:33

## 2021-11-29 RX ADMIN — ALBUTEROL SULFATE 2.5 MG: 2.5 SOLUTION RESPIRATORY (INHALATION) at 00:18

## 2021-11-29 RX ADMIN — SODIUM CHLORIDE, PRESERVATIVE FREE 10 ML: 5 INJECTION INTRAVENOUS at 20:50

## 2021-11-29 RX ADMIN — TIOTROPIUM BROMIDE AND OLODATEROL 2 PUFF: 3.124; 2.736 SPRAY, METERED RESPIRATORY (INHALATION) at 06:40

## 2021-11-29 RX ADMIN — METOPROLOL TARTRATE 12.5 MG: 25 TABLET, FILM COATED ORAL at 10:25

## 2021-11-29 RX ADMIN — PANTOPRAZOLE SODIUM 40 MG: 40 INJECTION, POWDER, FOR SOLUTION INTRAVENOUS at 10:24

## 2021-11-29 RX ADMIN — ALBUTEROL SULFATE 2.5 MG: 2.5 SOLUTION RESPIRATORY (INHALATION) at 06:40

## 2021-11-29 RX ADMIN — ALLOPURINOL 100 MG: 100 TABLET ORAL at 10:24

## 2021-11-29 RX ADMIN — METOPROLOL TARTRATE 12.5 MG: 25 TABLET, FILM COATED ORAL at 23:40

## 2021-11-29 RX ADMIN — CHLORHEXIDINE GLUCONATE 15 ML: 1.2 RINSE ORAL at 10:38

## 2021-11-29 RX ADMIN — ALTEPLASE 2 MG: 2.2 INJECTION, POWDER, LYOPHILIZED, FOR SOLUTION INTRAVENOUS at 12:45

## 2021-11-29 RX ADMIN — PIPERACILLIN AND TAZOBACTAM 3375 MG: 3; .375 INJECTION, POWDER, LYOPHILIZED, FOR SOLUTION INTRAVENOUS at 10:45

## 2021-11-29 RX ADMIN — CHLORHEXIDINE GLUCONATE 15 ML: 1.2 RINSE ORAL at 20:50

## 2021-11-29 RX ADMIN — SODIUM CHLORIDE SOLN NEBU 3% 4 ML: 3 NEBU SOLN at 12:25

## 2021-11-29 RX ADMIN — ALBUTEROL SULFATE 2.5 MG: 2.5 SOLUTION RESPIRATORY (INHALATION) at 12:24

## 2021-11-29 RX ADMIN — LORAZEPAM 1 MG: 2 INJECTION INTRAMUSCULAR; INTRAVENOUS at 05:05

## 2021-11-29 RX ADMIN — FLUOXETINE 40 MG: 20 CAPSULE ORAL at 10:24

## 2021-11-29 ASSESSMENT — PAIN SCALES - GENERAL
PAINLEVEL_OUTOF10: 0

## 2021-11-29 ASSESSMENT — PAIN SCALES - WONG BAKER
WONGBAKER_NUMERICALRESPONSE: 0
WONGBAKER_NUMERICALRESPONSE: 0

## 2021-11-29 ASSESSMENT — PULMONARY FUNCTION TESTS
PIF_VALUE: 22
PIF_VALUE: 22
PIF_VALUE: 21
PIF_VALUE: 22
PIF_VALUE: 22

## 2021-11-29 NOTE — PROGRESS NOTES
Tay Joiner, LANE - CNP  Urology Progress Note    Subjective: Modesto Nixon is a 73 y.o. female. s/p EGD PEG TUBE PLACEMENT on 10/27/2021 - 11/24/2021    His/Her current Diet is: ADULT TUBE FEEDING; PEG; Renal Formula; Continuous; 30; No; 240; Q 8 hours. Since the previous note,  No acute issues overnight. No fevers or chills. No nausea or vomiting. No chest pain or shortness of breath. No calf pain. Pain Controlled. Bronchoscopy yesterday  Chest tube placed  On HD  On mechanical vent via trach    Discussed with family, Urology not planning any surgical intervention at this time. She will need to be medically optimized before surgery    Vitals and Labs:  Patient Vitals for the past 24 hrs:   BP Temp Temp src Pulse Resp SpO2 Weight   11/29/21 1228 -- -- -- -- 17 -- --   11/29/21 1200 (!) 99/48 98.5 °F (36.9 °C) -- 78 18 -- 168 lb 10.4 oz (76.5 kg)   11/29/21 1000 (!) 107/55 98.9 °F (37.2 °C) Axillary 87 20 95 % --   11/29/21 0906 -- -- -- -- 20 95 % --   11/29/21 0643 -- -- -- 83 16 98 % --   11/29/21 0640 -- -- -- -- 16 98 % --   11/29/21 0445 (!) 111/57 98.7 °F (37.1 °C) Oral 84 18 99 % 167 lb 5.3 oz (75.9 kg)   11/29/21 0222 -- -- -- 86 16 98 % --   11/29/21 0100 (!) 97/51 98.5 °F (36.9 °C) Oral 87 17 99 % --   11/29/21 0019 -- -- -- -- -- 92 % --   11/28/21 2220 -- -- -- 77 17 96 % --   11/28/21 2045 (!) 116/56 98.3 °F (36.8 °C) Oral 84 18 94 % --   11/28/21 1610 (!) 106/54 98.8 °F (37.1 °C) Oral 75 16 100 % --     I/O last 3 completed shifts:   In: 778.1 [NG/GT:660; IV Piggyback:118.1]  Out: 157 [Urine:150; Chest Tube:7]    Recent Labs     11/27/21 0420 11/28/21 0320 11/29/21  0500   WBC 7.3 7.3 6.0   HGB 7.9* 7.9* 7.4*   HCT 25.3* 25.5* 24.4*   MCV 95.1 95.9 96.4    242 239     Recent Labs     11/27/21 0420 11/28/21 0320 11/29/21  0500   * 131* 132*   K 3.9 3.7 3.6   CL 99 96* 96*   CO2 26 26 25   BUN 30* 42* 51*   CREATININE 2.5* 2.9* 3.5*       No results for input(s): COLORU, PHUR, LABCAST, WBCUA, RBCUA, MUCUS, TRICHOMONAS, YEAST, BACTERIA, CLARITYU, SPECGRAV, LEUKOCYTESUR, UROBILINOGEN, Judithann Aus in the last 72 hours. Invalid input(s): NITRATE, GLUCOSEUKETONESUAMORPHOUS    Physical Exam:  No acute distress. Awake  Neck is supple  Regular rate and rhythm. Normal peripheral pulses  No accessory muscles of inspiration. Symmetric chest rise  Abdomen soft, non-tender, non-distended. No CVA tenderness. No calf pain. Minimal/no edema in bilateral lower extremities. Skin is warm, dry  Psych: mood, affect normal    Additional Lab/Culture results:     Imaging Reviewed:     LANE Sanderson CNP independently reviewed the images and verified the radiology reports from:    ECHO Complete 2D W Doppler W Color    Result Date: 10/28/2021  Transthoracic Echocardiography Report (TTE)  Demographics   Patient Name    Nedra Moore Gender               Female   MR #            [id number]     Race                                                  Ethnicity   Account #       [de-identified]     Room Number          0009   Accession       [id number]    Date of Study        10/28/2021  Number   Date of Birth   1950    Referring Physician  Eduardo Jameson MD   Age             73 year(s)    Deanna Chen MD                                Physician  Procedure Type of Study   TTE procedure:ECHOCARDIOGRAM COMPLETE 2D W DOPPLER W COLOR. Procedure Date Date: 10/28/2021 Start: 01:02 PM Study Location: Bedside Technical Quality: Limited visualization due to patient on ventilator. Indications:Evaluate pulmonary artery presures and Congestive heart failure.  Additional Medical History:Hypertension, pulmonary hypertension, chronic kidney disease Patient Status: Routine Height: 57 inches Weight: 165 pounds BSA: 1.66 m^2 BMI: 35.71 kg/m^2 BP: 86/55 mmHg Allergies   - No Known Allergies. Conclusions   Summary  Ejection fraction was estimated at 60-65%. The right ventricular size was normal with normal systolic function and  wall thickness. There was trace tricuspid regurgitation. IVC size is dilated with reduced respiratory phasic changes (CVP~10-15  mmHg). Signature   ----------------------------------------------------------------  Electronically signed by Lyndsey Petersen MD (Interpreting  physician) on 10/28/2021 at 04:38 PM  ----------------------------------------------------------------   Findings   Mitral Valve  The mitral valve structure was normal with normal leaflet separation. DOPPLER: The transmitral velocity was within the normal range with no  evidence for mitral stenosis. There was no evidence of mitral  regurgitation. Aortic Valve  The aortic valve was trileaflet with normal thickness and cuspal  separation. DOPPLER: Transaortic velocity was within the normal range with  no evidence of aortic stenosis. There was no evidence of aortic  regurgitation. Tricuspid Valve  The tricuspid valve structure was normal with normal leaflet separation. DOPPLER: There was no evidence of tricuspid stenosis. There was trace  tricuspid regurgitation. Pulmonic Valve  The pulmonic valve leaflets exhibited normal thickness, no calcification,  and normal cuspal separation. DOPPLER: The transpulmonic velocity was  within the normal range with no evidence for regurgitation. Left Atrium  Left atrial size was normal.   Left Ventricle  Normal left ventricular size and systolic function. There were no regional wall motion abnormalities. Wall thickness was within normal limits. Ejection fraction was estimated at 60-65%. Right Atrium  Right atrial size was normal.   Right Ventricle  The right ventricular size was normal with normal systolic function and  wall thickness. Pericardial Effusion  The pericardium was normal in appearance with a small, non-hemodynamically  significant pericardial effusion. Prominent pericardial fat pad. Pleural Effusion  No evidence of pleural effusion. Aorta / Great Vessels  IVC size is dilated with reduced respiratory phasic changes (CVP~10-15  mmHg).   M-Mode/2D Measurements & Calculations   LV Diastolic    LV Systolic Dimension: 3  AV Cusp Separation: 1.9 cmLA  Dimension: 4.5  cm                        Dimension: 3.1 cmAO Root  cm              LV Volume Diastolic: 67.1 Dimension: 2.9 cmLA Area: 14  LV FS:33.3 %    ml                        cm^2  LV PW           LV Volume Systolic: 27 ml  Diastolic: 1 cm LV EDV/LV EDV Index: 91.1  Septum          ml/55 m^2LV ESV/LV ESV  Diastolic: 1 cm Index: 27 UQ/44 m^2       RV Diastolic Dimension: 3.2 cm                  EF Calculated: 70.4 %                                            LA/Aorta: 1.07                                            Ascending Aorta: 2.7 cm                                            LA volume/Index: 32.1 ml /19m^2  Doppler Measurements & Calculations   MV Peak E-Wave:     AV Peak Velocity: 153 LVOT Peak Velocity: 110 cm/s  80.4 cm/s           cm/s                  LVOT Mean Velocity: 66.7 cm/s  MV Peak A-Wave:     AV Peak Gradient:     LVOT Peak Gradient: 5 mmHgLVOT  65.1 cm/s           9.36 mmHg             Mean Gradient: 2 mmHg  MV E/A Ratio: 1.24  AV Mean Velocity: 116  MV Peak Gradient:   cm/s                  TV Peak E-Wave: 50.9 cm/s  2.59 mmHg           AV Mean Gradient: 6   TV Peak A-Wave: 56.9 cm/s                      mmHg  MV Deceleration     AV VTI: 30.5 cm       TV Peak Gradient: 1.04 mmHg  Time: 243 msec                            TR Velocity:276 cm/s  MV P1/2t: 71 msec                         TR Gradient:30.47 mmHg  MVA by PHT:3.1 cm^2 LVOT VTI: 19.7 cm     PV Peak Velocity: 90.8 cm/s                      IVRT: 70 msec         PV Peak Gradient: 3.3 mmHg AV DVI (VTI): 0.65AV  NC ED Velocity: 159 cm/s                      DVI (Vmax):0.72  [email or web address] [id number]    XR CHEST PORTABLE    Result Date: 10/28/2021  1 view chest x-ray. Comparison: CR,SR - XR CHEST PORTABLE - 10/27/2021 03:55 PM EDT Findings: Improvement in bilateral airspace disease. No pneumothorax or pleural effusion. No mediastinal shift or tracheal deviation. Stable cardiomegaly. Passive venous congestion. Osseous structures intact. Tubes and catheters: ET tube 0.8 cm above avelina. NG tube is well within the body of the stomach. Right IJ catheter/sheath tip right atrium. Impression: 1. Persistent left lower lobe atelectasis or consolidation. 2. Cardiomegaly with passive venous congestion. 3. Supportive devices in situ. This document has been electronically signed by: Oneida Alexander MD on 10/28/2021 04:30 AM    XR CHEST PORTABLE    Result Date: 10/27/2021  PROCEDURE: XR CHEST PORTABLE CLINICAL INFORMATION: s/p Central Line, ett adjustment COMPARISON: No prior study. TECHNIQUE: AP portable chest radiograph performed. FINDINGS: The tip of the endotracheal tube terminates 9 mm above the avelina and should be adjusted. The tip of the right internal jugular central catheter terminates within the right atrium. The nasogastric tube terminates below the diaphragms. Increased interstitial pulmonary markings are seen bilaterally. Patchy opacities are seen in the right lung. The main pulmonary artery is dilated. Cardiac silhouette is enlarged. Small left pleural effusion. No pneumothorax. No acute bony abnormality. Degenerative changes of the thoracic spine. 1. The endotracheal tube terminates 9 mm above the avelina and should be withdrawn about 1-2 cm. Reimaging should be obtained. 2. Increased interstitial pulmonary markings are seen bilaterally. Patchy opacities seen in the right lung. 3. Mild cardiomegaly **This report has been created using voice recognition software. It may contain minor errors which are inherent in voice recognition technology. ** Final report electronically signed by Dr Lolis Diez on 10/27/2021 5:32 PM    XR CHEST PORTABLE    Result Date: 10/27/2021  PROCEDURE: XR CHEST PORTABLE CLINICAL INFORMATION: Postthoracentesis. COMPARISON: Chest x-ray 10/27/2021. TECHNIQUE: AP portable chest radiograph performed. FINDINGS: Lines/tubes: The endotracheal tube terminates approximately 18 mm above the level of the avelina. The nasogastric catheter extends below the hemidiaphragms is distal tip in the projection of the left upper quadrant. Heart/mediastinum: Cardiomegaly is stable. The pulmonary vascularity is unremarkable. Lungs: The left pleural effusion has been drained. No pneumothorax is identified. Improved aeration is noted in the left hemithorax. A trace right pleural effusion is suspected. Bones: Diffuse osteopenia is observed. The visualized skeletal structures appear intact. Status post drainage of the left pleural effusion with improved aeration noted in the left hemithorax. No pneumothorax observed. Cardiomegaly is stable. Life support and monitoring devices are unchanged. **This report has been created using voice recognition software. It may contain minor errors which are inherent in voice recognition technology. ** Final report electronically signed by Dr Katey Valencia on 10/27/2021 4:18 PM    XR CHEST PORTABLE    Result Date: 10/27/2021  PROCEDURE: XR CHEST PORTABLE CLINICAL INFORMATION: Endotracheal tube adjustment. COMPARISON: Chest x-ray 10/27/2021. TECHNIQUE: AP portable chest radiograph performed. FINDINGS: Lines/tubes: The endotracheal tube tip now terminates approximately 18 mm above the level of the avelina. The nasogastric catheter extends below the hemidiaphragms with tip in the projection of the left upper quadrant, incompletely visualized. Heart/mediastinum: Cardiomegaly is unchanged. Lungs: Improved aeration is noted in the left upper lobe. There continues to be left perihilar and left lower lobe consolidation. Improved aeration is noted in the right lower lobe. No pneumothorax is observed. Bones: The visualized skeletal structures appear intact. The endotracheal tube has been adjusted with tip now terminating approximately 18 mm above the level of the avelina. Improved aeration is noted in the left upper lobe and right lower lobe. Persistent dense consolidation in the left mid and lower lobe are observed. **This report has been created using voice recognition software. It may contain minor errors which are inherent in voice recognition technology. ** Final report electronically signed by Dr Yaz Crawford on 10/27/2021 3:41 PM    XR CHEST PORTABLE    Result Date: 10/27/2021  PROCEDURE: XR CHEST PORTABLE CLINICAL INFORMATION: Intubation. COMPARISON: Chest x-ray 10/27/2021. TECHNIQUE: AP portable chest radiograph performed. FINDINGS: Lines/tubes: The endotracheal tube tip terminates within the proximal right mainstem bronchus. A nasogastric catheter extends below the hemidiaphragm in the projection of the left upper quadrant. Heart/mediastinum: Cardiomegaly is stable. Lungs: Improved aeration is noted in the left hemithorax. Dense consolidation is seen in the left upper lobe and left lower lobe. Improved aeration is noted in the right lung base. No pneumothorax is observed. Bones: Diffuse osteopenia is present. The visualized skeletal structures appear intact. The endotracheal tube tip terminates in the right mainstem bronchus. Improved aeration is noted in the right lower lobe and left hemithorax. There continues to be dense consolidation in the left upper lobe and left lower lobe. Cardiomegaly is stable. COMMUNICATION: The results of this examination were discussed with Dr. Chayo Murillo at 3:15 PM on 10/27/2021.  **This report has been created using voice recognition software. It may contain minor errors which are inherent in voice recognition technology. ** Final report electronically signed by Dr Yaz Crawford on 10/27/2021 3:16 PM    XR CHEST PORTABLE    Result Date: 10/27/2021  PROCEDURE: XR CHEST PORTABLE CLINICAL INFORMATION: Shortness of breath. COMPARISON: Chest x-ray 7/15/2020. TECHNIQUE: AP portable chest radiograph performed. FINDINGS: Lines/tubes: Monitoring devices overlie the chest. Heart/mediastinum: The heart size and mediastinal contours are obscured. Pulmonary vascular congestion is observed. Lungs: Near complete opacification of the left hemithorax with very little aerated lung in the left upper lobe is observed. A small right pleural effusion with right lower lobe consolidation obscures visualization of the right hemidiaphragm. No pneumothorax is identified. Bones: Diffuse osteopenia is present. The visualized skeletal structures appear intact. Near complete opacification of the left hemithorax with very little aerated lung visualized in the left upper lobe. Small right pleural effusion and right lower lobe consolidation obscures visualization of the right hemidiaphragm pulmonary vascular congestion is observed. **This report has been created using voice recognition software. It may contain minor errors which are inherent in voice recognition technology. ** Final report electronically signed by Dr Yaz Crawford on 10/27/2021 2:20 PM    US THORACENTESIS Which side should the procedure be performed? Left    Result Date: 10/27/2021  THORACENTESIS WITH ULTRASOUND GUIDANCE: PERFORMED BY: Ivon Lamas M.D. CLINICAL INFORMATION: Pleural effusion left side. APPROACH: Left hemithorax, inferolateral, patient in right side down decubitus position. . Multiple permanent sonographic images were obtained during procedure for documentation.  FLUID WITHDRAWN: 1100 mL hickman serous fluid ESTIMATED BLOOD LOSS: Minimal PROCEDURE: Signed informed consent was obtained prior to performing this procedure. The thorax was initially evaluated sonographically to determine appropriate puncture site. The skin was marked, prepped, and draped in a sterile fashion. Following local anesthesia and utilizing aseptic technique, a 5 [place] one-step catheter was successfully inserted into the pleural effusion at the position indicated above. Pleural fluid in the amount was above was then aspirated and the needle was removed. The patient tolerated the procedure well. A post procedure chest radiograph will be obtained. Status post thoracentesis **This report has been created using voice recognition software. It may contain minor errors which are inherent in voice recognition technology. ** Final report electronically signed by Dr. Pieter Mcfadden on 10/27/2021 4:33 PM      Impression:    Patient Active Problem List   Diagnosis    HTN (hypertension)    Chronic depression    CHF (congestive heart failure) (HCC)    Thrombocytopenia (HCC)    Moderate episode of recurrent major depressive disorder ([facility] [address]    Renal failure syndrome    Hyperkalemia    Isolated non-nephrotic proteinuria    ALVIN (acute kidney injury) ([address]    Chronic right-sided heart failure (HCC)    Pulmonary HTN (HCC)    Hypotension due to hypovolemia    Acute renal failure superimposed on stage 4 chronic kidney disease (HCC)    Pressure ulcer of right buttock, stage 3 (HCC)    Acute respiratory failure (HCC)    Flash pulmonary edema (HCC)    Shock (HCC)    Aspiration pneumonia of left lung (HCC)    Pleural effusion    Paroxysmal atrial fibrillation (HCC)    Hemoptysis    Chronic respiratory failure with hypoxia (HCC)    Pneumothorax, right       s/p EGD PEG TUBE PLACEMENT on 10/27/2021 - 11/24/2021    1. ALVIN- Multifactorial (sepsis, left hydronephrosis, contrast). Nephrology following. CRT rising. On HD     2. Gross Hematuria- resolved, off CBI Urine has remained clear.  Call urology with any return of hematuria.     3. Pyelonephritis- Perinephric stranding on CT abdomen and pelvis with a small area concerning for developing abscess in the region of the left nephrostomy tube. Original and subsequent CT on 11/9/21 are stable. CT repeated 11/17/2021 shows no renal abscess.     4. Left Staghorn Calculus- Nephrostomy tube place 11/2/21. Will need treated when patient stable. Tube flushes without problems. Discussed this with family at bedside today     5. Acute Blood Loss Anemia- HH stable     6. Respiratory Failure secondary to Pneumonia-  Tracheostomy insertion on 11/17/2021.        Urology will continue to follow peripherally.   Contact us if hematuria returns    LANE Roblero CNP  4:05 PM 11/29/2021

## 2021-11-29 NOTE — FLOWSHEET NOTE
11/29/21 1555   Vital Signs   BP (!) 108/56   Temp 96.8 °F (36 °C)   Pulse 80   Resp 18   Weight 165 lb 12.6 oz (75.2 kg)   Percent Weight Change -1.7   Post-Hemodialysis Assessment   Post-Treatment Procedures Blood returned; Catheter capped, clamped and heparinized x 2 ports   Machine Disinfection Process Acid/Vinegar Clean; Heat Disinfect; Exterior Machine Disinfection   Rinseback Volume (ml) 400 ml   Total Liters Processed (l/min) 36.8 l/min   Dialyzer Clearance Lightly streaked   Duration of Treatment (minutes) 210 minutes   Heparin amount administered during treatment (units) 0 units   Hemodialysis Intake (ml) 400 ml   Hemodialysis Output (ml) 1751 ml   NET Removed (ml) 1351 ml   Tolerated Treatment Fair   treatment ended 55 minutes early due to nonfunctioning dialysis catheter. cath lines were dwelled with cath flow prior to initiation of treatment. cath lines locked with heparin . report given to primary nurse. treatment record printed to be scanned into emr.

## 2021-11-29 NOTE — FLOWSHEET NOTE
11/29/21 0257   Provider Notification   Reason for Communication Critical Value (comment)  (See CXR report)   Provider Name Dr. Monique Antonio   Provider Notification Resident   Method of Communication Secure Message   Response No new orders   Notification Time [phone]     Notified Dr. Monique Antonio of critical CXR findings of complete opacification Left hemithorax with volume loss. Vitals stable at this time, Dr. Monique Antonio to notify day shift team and follow up with pulmonology for input.

## 2021-11-29 NOTE — PROGRESS NOTES
[facility]   Nephrology progress note  11/29/2021, 10:08 AM      Pt Name:    Ml Lopez  MRN:     [id number]     YOB: 1950  Admit Date:    10/27/2021  1:36 PM  Primary Care Physician:  Mukul Griffin MD   Room number  4K-12/012-A    Chief Complaint: Nephrology following for ALVIN    Subjective:  Patient seen and examined  Awake  However not much could be obtained from patient herself  Currently on mechanical ventilator  On 30% FiO2, PEEP of 6 tracheostomy present    Objective:  24HR INTAKE/OUTPUT:    Intake/Output Summary (Last 24 hours) at 11/29/2021 1008  Last data filed at 11/29/2021 0505  Gross per 24 hour   Intake 1253.06 ml   Output 237 ml   Net 1016.06 ml     I/O last 3 completed shifts: In: 1328.1 [NG/GT:1210; IV Piggyback:118.1]  Out: 237 [Urine:175; Stool:50; Chest Tube:12]  No intake/output data recorded. Admission weight: 165 lb (74.8 kg)  Wt Readings from Last 3 Encounters:   11/29/21 167 lb 5.3 oz (75.9 kg)   10/25/21 164 lb (74.4 kg)   09/29/21 160 lb (72.6 kg)     Body mass index is 36.21 kg/m².     Physical examination  VITALS:     Vitals:    11/29/21 0640 11/29/21 0643 11/29/21 0906 11/29/21 1000   BP:    (!) 107/55   Pulse:  83  87   Resp: 16 16 20 20   Temp:    98.9 °F (37.2 °C)   TempSrc:    Axillary   SpO2: 98% 98% 95% 95%   Weight:       Height:         General Appearance: No acute distress, ill-appearing  Neck: Tracheostomy present  Lungs: Diminished air entry  Heart:  S1, S2 heard  GI: Soft      Lab Data  CBC:   Recent Labs     11/27/21 0420 11/28/21  0320 11/29/21  0500   WBC 7.3 7.3 6.0   HGB 7.9* 7.9* 7.4*   HCT 25.3* 25.5* 24.4*    242 239     BMP:  Recent Labs     11/27/21 0420 11/28/21  0320 11/29/21  0500   * 131* 132*   K 3.9 3.7 3.6   CL 99 96* 96*   CO2 26 26 25   BUN 30* 42* 51*   CREATININE 2.5* 2.9* 3.5*   GLUCOSE 113* 107 108   CALCIUM 8.4* 8.4* 8.2*   MG 1.7 1.6 1.8     Hepatic: No results for input(s): LABALBU, AST, ALT, ALB, BILITOT, ALKPHOS in the last 72 hours. Meds:  Infusion:    sodium chloride 25 mL (11/28/21 2230)    dextrose Stopped (11/25/21 1596)    [Held by provider] sodium chloride 50 mL/hr at 10/28/21 2314     Meds:    midazolam  4 mg IntraVENous Once    ketamine  50 mg IntraVENous Once    lidocaine PF  20 mL IntraDERmal Once    metoprolol tartrate  12.5 mg Oral BID    sodium chloride flush  5-40 mL IntraVENous 2 times per day    metoclopramide  5 mg IntraVENous Q8H    sodium chloride (Inhalant)  4 mL Nebulization Q6H    And    albuterol  2.5 mg Nebulization Q6H    lactobacillus  1 capsule Per NG tube Daily with breakfast    piperacillin-tazobactam  3,375 mg IntraVENous Q12H    chlorhexidine  15 mL Mouth/Throat BID    insulin lispro  0-6 Units SubCUTAneous Q6H    allopurinol  100 mg Oral Daily    phosphorus replacement protocol   Other RX Placeholder    potassium bicarb-citric acid  40 mEq Oral Once    tiotropium-olodaterol  2 puff Inhalation Daily    [Held by provider] aspirin  81 mg Oral Daily    pantoprazole  40 mg IntraVENous QAM    sodium hypochlorite   Irrigation Daily    [Held by provider] Riociguat  2.5 mg Oral Q8H    FLUoxetine  40 mg Oral Daily     Meds prn: sodium chloride flush, sodium chloride, LORazepam, lidocaine PF, ondansetron, albuterol, fentanNYL, glucose, dextrose, glucagon (rDNA), dextrose, polyethylene glycol, senna, acetaminophen       Impression and Plan:  1. ALVIN secondary to ATN currently dialysis dependent  Currently with ongoing oliguria, 175 ml 24 hours  Input and output reviewed  Interdialytic creatinine is rising  Baseline serum creatinine 0.7-0.9  Avoid hypotension  We will plan dialysis treatment today  Informed dialysis staff  2. Mild hyponatremia  3. Volume overload, improving  4. Staghorn calculus  5. Respiratory failure on ventilator via tracheostomy: +chest tube  6. Left lung opacification status post flexible bronchoscopy.   Chest x-ray showing complete opacification. Pulmonary follow-up    D/W RN  Dr. Hawa Frazier resumes care in a..     Frances Gil MD  [facility]

## 2021-11-29 NOTE — PROGRESS NOTES
HOSPITALIST PROGRESS NOTE    Patient - Mirian Robison   MRN -  [id number]   Roverto Fuentes # - [de-identified]   - 1950      Date of Admission -  10/27/2021  1:36 PM  Date of evaluation -  2021  Room - -[facility]   Hospital Day - 35  Primary Care Physician - Maricruz Arroyo MD   Code Status: FULL CODE    Chief Complaint:    Shortness of breath     History Of Presenting Illness:     71F [facility] w/ SOB. Current smoker w/ PMH PAH grp 3, HFpEF, stage III sacral ulcer s/p wound ostomy . Patient sister reports SpO2 51% at home and was brought to ED where ABG showed pH 7.19, PCO2 80, PO2 134. She required emergent intubation and was transferred to ICU. Workup revealed left sided pleural effusion and underwent US guided thoracentesis w/ 1100 cc removed consistent w/ MRSA/adenovirus. Patient was noted to be in afib/RVR and was placed on amio, heparin drip. Patient was also noted to have normocytic anemia and received 1 unit PRBC 10/31/21. CT abdomen revealed CBD dilation w/ cholelithiasis.      2021: Plan for nephrostomy tubes today. Hemoglobin 7.7 s/p 1 unit PRBC yesterday. Weaned to 4 mics Levophed. 2021: Patient resumed back on prior dose of vancomycin. Low urine output w/ increasing pressor requirements today  2021: Will attempt SBT if able to wean today. Increased Amio drip 2/2 worsening tachycardia. Continued hematuria w/ low urine output. Cr stable. Will evaluate UTI following perc tube placement. Rise in WBC noted. Culture pending  2021: Cardioversion on 11/3/2021. Now and NSR. CVP 29 this a.m. Suspect drop in CI 2/2 to stopping levophed. Diffuse edema following transfusion overnight. Given one-time dose of 2 mg Bumex and albumin. SBT this AM  2021: Failed SBT yesterday. Continued low urine output. Trial of Bumex today. Will proceed with dialysis if fails to improve.   2021: Started on CRRT, continues to have bloody drainage of nephrostomy tube given 1 unit PRBC, cefipime emperically pending cultures of nephrostomy tube fluid and repeat resp cultures. 11/07/21: Patient remains clinically well on exam. We'll continue medical of UF with CRRT. On empiric cefepime for coverage of perinephric abscess noted on CT abdomen and pelvis November 4, 2021. White count trending down. Will attempt spontaneous breathing trial in a.m.  11/08/21: Transfused 1x PRBC this AM. Anemia workup pending. WBC trending down. Patient ventilator settings this AM preclude SBT. Volume overloaded on exam. UF increased to 125 cc/hr this AM. Will attempt to wean vent settings further further. Coag neg staph growing on nephrostomy tube aspirate. Suspect contamination. Continue existing ABx  11/09/21: Pulse dose steroids initiated overnight. Started on SSI. Urine output now improving 75cc/hr. Transfused 3 units PRBCs yesterday for Improve DO2. MERCEDES noted w/ 875 mg deficit. Will replace once steroids/ABx completed. 11/10/21: Extubated overnight. SLP eval today. Continue BiPAP while asleep w/ transition to NC while awake as tolerated. No tachypnea. Day 3 pulse dose steroids. Continued bloody drainage from nephrostomy tube. Holding [facility]   11/11/21: Worsening bilateral infiltrates with SpO2 <90% on HFNC. Continued pulmonary hygiene w/ nebulized hypertonic saline, acapella and breathing treatments in addition to deep suctioning. Continued on Vancomycin for MRSA PNA. Plan for family meeting this AM to discuss goals of care. 11/12/21: Follow-up discussion from family meeting. Patient nodded agreement with full code status including reintubation leading to tracheostomy and PEG placement and continued hemodialysis if needed. Patient nodded agreement and understanding with these decisions. CXR yesterday evening did show left mainstem mucus plugging with cutoff sign and absent breath sounds and did undergo emergent bronchoscopy overnight on 11/12/21.  Diminished breath sounds and worsening respiratory status this AM concerning for continued plugging. Stat chest X-ray ordered for confirmation. 11/13/21: Respiratory status continues to decline, she is requiring BiPAP with FiO2 of 70%. Chest x-ray shows complete opacification of the left lung secondary to mucous plugging, also the patient has left-sided pleural effusion. Plan for bronchoscopy. 11/15/2021: Family meeting today per event note. Reintubated today after failed BiPAP  11/16/2021: will dc abx tomorrow. PNA panel negative. Plan for tracheostomy time permitting today. Weaning phenylephrine as tolerated. 11/17/2021: Necrotic Stage IV pressure ulcer probing to bone noted this AM. CT abdomen and pelvis pending to evaluate for abscess. Will discuss with family today when available. 11/18/2021: CT showed bony involvement of sacral ulcer w/ concern for osteomyelitis. Wound ostomy following. Culture pending. Started on broad spectrum therapy with Vancomycin and Zosyn for coverage of suspect osteomyelitis. Weaning sedation as tolerated  11/19/21: Wound culture pending. On broad spectrum ABx for coverage of osteomyelitis. Will de-escalate pending culture/sensitiivity. Consult to GI for PEG tube evaluation. 11/21/21: Tolerated conventional HD on 11/20/21 w/ 2500cc removed. Wound culture growing gram negative bacilli. Vanc discontinued. Weaned off phenylephrine. Will monitor overnight. Okay to transfer to step down in AM  11/22/21: FiO2 30%, rate of 8 L/min. Remains on tube feeds on day 5 Zosyn. Bronchoscopy for hemoptysis unremarkable findings of old bloody residual likely from trach procedure. 11/23/21: Remains on tube feeds on day 6 Zosyn. Creatinine bumped up to 3.5 today with Na low at 124. Nephrology performed HD today with tunneled cath next. GI planning for PEG tube placement tomorrow. 11/24/21: 7-day Zosyn completed. PEG tube placed by GI.  11/25/21: Labs looking a little worse, will be due for another hemodialysis session tomorrow.  Otherwise well.   11/26/21: Medial pneumothorax noted on Chest XR in the AM, subsequent CT chest confirmed 20% being the size. Patient was taken down to IR for chest tube placement which was successful. Attached to suction -20 mmHg. Patient also underwent hemodialysis today. Overall, still with a lot going on. Was told that patient is #2 in line for a bed at [facility] but given her ongoing acuity of care  11/27/21: Medial pneumothorax improved on repeat chest XR, chest tube in place. AM labs and kidney function looked a lot better today s/p HD yesterday. 11/28/21: Required bronchoscopy as there was left lung opacification resulting from recurrent mucus plug. 2 units platelets transfused. 11/29/21: See below  Subjective (Last 24 Hours):    Patient laying in bed comfortably this morning with no complaints or concerns. Chest tube removed. Urology evaluated patient and don't plan for intervention for staghorn calculus at this time. Still with left sided. HD received. All other ROS negative.    Medications    Current Medications    heparin (porcine)        Sodium Hypochlorite   Irrigation Daily    midazolam  4 mg IntraVENous Once    ketamine  50 mg IntraVENous Once    lidocaine PF  20 mL IntraDERmal Once    metoprolol tartrate  12.5 mg Oral BID    sodium chloride flush  5-40 mL IntraVENous 2 times per day    metoclopramide  5 mg IntraVENous Q8H    sodium chloride (Inhalant)  4 mL Nebulization Q6H    And    albuterol  2.5 mg Nebulization Q6H    lactobacillus  1 capsule Per NG tube Daily with breakfast    chlorhexidine  15 mL Mouth/Throat BID    insulin lispro  0-6 Units SubCUTAneous Q6H    allopurinol  100 mg Oral Daily    phosphorus replacement protocol   Other RX Placeholder    potassium bicarb-citric acid  40 mEq Oral Once    tiotropium-olodaterol  2 puff Inhalation Daily    [Held by provider] aspirin  81 mg Oral Daily    pantoprazole  40 mg IntraVENous QAM    [Held by provider] Riociguat  2.5 mg Oral and dry. Psych: Alert and oriented to person place and time, affect appropriate  Lymph:  No supraclavicular adenopathy. Neurologic: No focal deficit. No seizures. Labs   ABG  Lab Results   Component Value Date    PH 7.36 11/13/2021    PO2 59 11/13/2021    PCO2 45 11/13/2021    HCO3 26 11/13/2021    O2SAT 89 11/13/2021     Lab Results   Component Value Date    IFIO2 70 11/13/2021    MODE PC 11/05/2021    SETPEEP 8.0 11/15/2021     CBC  Recent Labs     11/27/21  0420 11/28/21  0320 11/29/21  0500   WBC 7.3 7.3 6.0   RBC 2.66* 2.66* 2.53*   HGB 7.9* 7.9* 7.4*   HCT 25.3* 25.5* 24.4*   MCV 95.1 95.9 96.4   MCH 29.7 29.7 29.2   MCHC 31.2* 31.0* 30.3*    242 239   MPV 8.7* 8.9* 9.1*      BMP  Recent Labs     11/27/21  0420 11/28/21  0320 11/29/21  0500   * 131* 132*   K 3.9 3.7 3.6   CL 99 96* 96*   CO2 26 26 25   BUN 30* 42* 51*   CREATININE 2.5* 2.9* 3.5*   GLUCOSE 113* 107 108   MG 1.7 1.6 1.8   CALCIUM 8.4* 8.4* 8.2*     LFT  No results for input(s): AST, ALT, ALB, BILITOT, ALKPHOS, LIPASE in the last 72 hours. Invalid input(s): AMYLASE  TROP  Lab Results   Component Value Date    TROPONINT 0.084 10/28/2021    TROPONINT 0.059 10/27/2021    TROPONINT 0.037 03/06/2020     BNP  No results for input(s): BNP in the last 72 hours. Lactic Acid  No results for input(s): LACTA in the last 72 hours. INR  No results for input(s): INR, PROTIME in the last 72 hours. PTT  No results for input(s): APTT in the last 72 hours.   Glucose  Recent Labs     11/29/21  0511 11/29/21  1433 11/29/21  1736   POCGLU 112* 102 98     Microbiology     Procedure Component Value Units Date/Time   Culture, Blood 2 [[id number]] Collected: 11/27/21 0748   Order Status: Sent Specimen: Blood Updated: 11/27/21 0828   Culture, Blood 1 [[id number]] Collected: 11/27/21 0755   Order Status: Sent Specimen: Blood Updated: 11/27/21 0827     Procedures   - Hemodialysis on 11/23/21   - Hemodialysis on 11/26/21   - Chest Tube placed on 11/27/21 - Hemodialysis on 11/29/21     Imaging:      Chest XR 11/29/21:    Problem List      Active Hospital Problems    Diagnosis Date Noted    Pneumothorax, right [J93.9]     Paroxysmal atrial fibrillation ([address] [I48.0] 11/22/2021    Hemoptysis [R04.2]     Chronic respiratory failure with hypoxia (HCC) [J96.11]     Aspiration pneumonia of left lung ([address] [J69.0]     Pleural effusion [J90]     Shock ([address] [R57.9]     Flash pulmonary edema ([address] [J81.0]     Acute respiratory failure ([address] [J96.00] 10/27/2021    Pressure ulcer of right buttock, stage 3 ([address] [L89.313] 07/29/2021    Pulmonary HTN ([address] [I27.20] 09/03/2019      Assessment & Plan:   1. Subacute combined hypercapnic and hypoxic respiratory failure: D/t to pneumonia with L-sided pleural effusion and repeated mucous plugging. Intubated 10/27/21. Extubated following successful SBT on 11/9/21. Failed HFNC and was reintubated on 11/15/21. Tracheostomy placed 11/17/21.     - Pulmonology following    - Wean as tolerated   - Lung protective strategies    - Aspiration precautions    2. Left Pulmonary Mucus Plug: Asymptomatic, opacity/complete opacification on 11/28 CXR (relatively asymptomatic)         - Concern for recurrent mucus plug.         - D/w Pulmonary - s/p bronchoscopy with mucus plug in left mainstem bronchus   - Remained with total opacification on the L s/p bronch 11/29 seen on repeat chest XR   - Pulm on board, plan for repeat CT Chest   3. Pneumothorax on Right - 20% in size:  Resolved. Likely 2/2 PPV. Chest XR on 11/26 showed stable appearance of right pneumothorax of about 10% as measured on the AP radiograph. Subsequent CT chest showed multiloculated pneumothorax right side approximately 20% in size. - Chest tube was place by IR on the right on 11/26, removed on 11/29/21   - Repeat chest XR 11/27 reveals improvement in pneumothorax   4. Stage III ALVIN on CKD 3: Suspect post renal etiology 2/2 staghorn calculi. Nephrology following.  CRRT 11/5/21 until 11/12/21. Anti-DS-DNA negative, Anti histone negative, Anti Smith,and  Anti-Savana WNL, GBM antibodies negative, C3/C4 levels normal, elevated kappa/lambda free light chains with normal ratio. HD started 11/20/21. Positive for ANCA associated vasculitis. ANCA associated Abs show elevated anti MPO elevated 416 and serine proteinase 3 IgG WNL   - Plans for biopsy per nephrology when stable   - CVVH stopped 11/15/21 significant oliguria since that time   - Hemodialysis on 11/29 done with good response   5. Dysphagia: 2/2 prolonged intubation. PEG tube placed on 11/24/21. Protonix daily   6. Hemoptysis: (resolved): Bronchoscopy on 11/22 remarkable for old blood stained mucus in RLL. No active bleed. - Pulm following, appreciate recs     - Jose Alfredo 300 mg by nebulization twice daily  7. Acute blood loss anemia with chronic normocytic anemia: 2/2 hematuria s/p perc tube placement w/ Iron studies consistent w/ MERCEDES vs Anemia chronic disease. B12 WNL, Folate low, Abs reticulocyte index 11/18/21 0.49% Soluable transferrin 139. Calculated Iron deficit 827mg. PRBC x1 transfused 10/31/21, 11/4/21, 11/5/21, x2 11/6/21, x3 11/8/21, x3 11/15/21.    - Heparin stopped (dialysis dose still running)   - Continue holding ASA    - Venofer, replace folate once ABx stopped  8. 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 Probes to bone, necrosis noted around wound site. CT abdomen and pelvis showing possible mi involvement with concern for osteomyelitis. Seen by general surgery on 11/17/21. No indications for debridement at the time. Poor candidate for surgery given multiple co-morbidities, malnutrition and poor wound healing. Wound cultures from 11/19 growing proteus mirabilis and pseudomonas aeruginosa.      - Wound care following, continue Dakins w/ dressing changes   - Rectal tube in place, continue     - Completed 10-day course of IV Zosyn 11/28/21  9. Hypokalemia: Resolved. 3.4 on 11/24. Potassium replacement protocol in place. 10. Hyponatremia: 124 on 11/23 in setting of ALVIN stage III on CKD. Will get HD on 11/23/21. Monitor with daily BMP   11. Hematuria: s/p percutaneous tube placement. CBI per above. Stopped heparin, holding ASA. 12. ANCA associated Vasculitis: workup per above. Will need Renal Bx for confirmation once stable. Discussed case w/ Nephrology. Completed pulse dose steroids starting on 11/8/21 w/ 10mg/kg methylprednisolone for 3 days. SSI started for coverage. Not candidate for Rituxin/TPE 2/2 existing infection. 13. Bilateral Pleural Effusion:   Associated volume overload in context of renal failure. Interval enlargement noted on CT 11/1/21. Currently on CVVH for effusions. Plan for chest tube insertion if no improvement  14. Hydronephrosis: 2/2 large staghorn calculi. S/p nephrostomy tube placement on left still some bloodstained discharge  15. Suspected COPD: current every day smoker. Obstructive process noted on flow volume loop on ventilator. Started empiric therapy with Stiolto. Recommend formal PFT once improved  16. PAH/chronic RV failure NYHA II: EF 55 to 60% G2 DD TTE 5/4/2021. RHC showed [place] 30 with elevated PCWP. Treated w/ Riociguat. 17. Chronic tobacco use:  cessation  18. Gout: Continue allopurinol  19. BARBER: non compliant w/ CPAP   20. Septic Shock: Resolved. 2/2 severe PNA now concern for necrotizing wound on sacrum. Initial CI 6.3 w/ NE precludes concern for septic shock NE d/c 2/2 worsening afib RVR  21. Thrombocytopenia: Resolved. Suspect consumptive process in context of sepsis. Not on heparin   22. Severe bilateral multiorganism pneumonia: Resolved. PCR positive Staph w/ MECA gene and adenovirus consistent w/ MRSA colonization. Staph completed 14 days Vancomycin (started 10/28/21) and completed 5 days Rocephin.  Clindamycin started for coverage of PVL as patient clinically worsening following 14 days Vancomycin for MRSA coverage. Underwent bronchoscopy for left mainstem mucus plug noted CXR on 11/11/21. Switched to Clindamycin for coverage of PVL received 8 days therapy (started 11/11/21). PNA panel from 11/15/21 negative. Repeat cultures show no growth    PT/OT Eval Status: Daily PT    VTE prophylaxis: [] Lovenox                                 [x] SCDs                                 [] SQ Heparin                                 [] Encourage ambulation           [] Already on Anticoagulation     IV Fluids: None   Diet:  PEG tube   Code Status: Full Code  Disposition: [facility] BLOOD once bed available & stable.  Guarded long term prognosis     Tele:   [x] yes             [] no    Electronically signed by   Ja Hawk DO on 11/29/2021 at 6:25 PM

## 2021-11-30 ENCOUNTER — APPOINTMENT (OUTPATIENT)
Dept: GENERAL RADIOLOGY | Age: 71
DRG: 004 | End: 2021-11-30
Payer: MEDICARE

## 2021-11-30 ENCOUNTER — APPOINTMENT (OUTPATIENT)
Dept: CT IMAGING | Age: 71
DRG: 004 | End: 2021-11-30
Payer: MEDICARE

## 2021-11-30 ENCOUNTER — ANESTHESIA (OUTPATIENT)
Dept: ENDOSCOPY | Age: 71
DRG: 004 | End: 2021-11-30
Payer: MEDICARE

## 2021-11-30 ENCOUNTER — ANESTHESIA EVENT (OUTPATIENT)
Dept: ENDOSCOPY | Age: 71
DRG: 004 | End: 2021-11-30
Payer: MEDICARE

## 2021-11-30 VITALS — OXYGEN SATURATION: 99 % | DIASTOLIC BLOOD PRESSURE: 46 MMHG | SYSTOLIC BLOOD PRESSURE: 96 MMHG

## 2021-11-30 LAB
ACINETOBACTER CALCOACETICUS-BAUMANNII BY PCR: NOT DETECTED
ADENOVIRUS BY PCR: NOT DETECTED
ANION GAP SERPL CALCULATED.3IONS-SCNC: 11 MEQ/L (ref 8–16)
BASOPHILS # BLD: 0.6 %
BASOPHILS ABSOLUTE: 0 THOU/MM3 (ref 0–0.1)
BUN BLDV-MCNC: 35 MG/DL (ref 7–22)
CALCIUM IONIZED: 1.23 MMOL/L (ref 1.12–1.32)
CALCIUM SERPL-MCNC: 8.5 MG/DL (ref 8.5–10.5)
CHLAMYDIA PNEUMONIAE BY PCR: NOT DETECTED
CHLORIDE BLD-SCNC: 98 MEQ/L (ref 98–111)
CO2: 24 MEQ/L (ref 23–33)
CREAT SERPL-MCNC: 2.7 MG/DL (ref 0.4–1.2)
ENTEROBACTER CLOACAE COMPLEX BY PCR: NOT DETECTED
EOSINOPHIL # BLD: 2.4 %
EOSINOPHILS ABSOLUTE: 0.2 THOU/MM3 (ref 0–0.4)
ERYTHROCYTE [DISTWIDTH] IN BLOOD BY AUTOMATED COUNT: 16.8 % (ref 11.5–14.5)
ERYTHROCYTE [DISTWIDTH] IN BLOOD BY AUTOMATED COUNT: 58.4 FL (ref 35–45)
ESCHERICHIA COLI BY PCR: NOT DETECTED
GFR SERPL CREATININE-BSD FRML MDRD: 17 ML/MIN/1.73M2
GLUCOSE BLD-MCNC: 101 MG/DL (ref 70–108)
GLUCOSE BLD-MCNC: 104 MG/DL (ref 70–108)
GLUCOSE BLD-MCNC: 106 MG/DL (ref 70–108)
HAEMOPHILUS INFLUENZAE BY PCR: NOT DETECTED
HCT VFR BLD CALC: 25.4 % (ref 37–47)
HEMOGLOBIN: 7.7 GM/DL (ref 12–16)
IMMATURE GRANS (ABS): 0.15 THOU/MM3 (ref 0–0.07)
IMMATURE GRANULOCYTES: 1.9 %
INFLUENZA A BY PCR: NOT DETECTED
INFLUENZA B BY PCR: NOT DETECTED
KLEBSIELLA AEROGENES BY PCR: NOT DETECTED
KLEBSIELLA OXYTOCA BY PCR: NOT DETECTED
KLEBSIELLA PNEUMONIAE GROUP BY PCR: NOT DETECTED
LEGIONELLA PNEUMOPHILIA BY PCR: NOT DETECTED
LYMPHOCYTES # BLD: 9.5 %
LYMPHOCYTES ABSOLUTE: 0.7 THOU/MM3 (ref 1–4.8)
MAGNESIUM: 1.7 MG/DL (ref 1.6–2.4)
MCH RBC QN AUTO: 28.8 PG (ref 26–33)
MCHC RBC AUTO-ENTMCNC: 30.3 GM/DL (ref 32.2–35.5)
MCV RBC AUTO: 95.1 FL (ref 81–99)
METAPNEUMOVIRUS BY PCR: NOT DETECTED
MONOCYTES # BLD: 10.7 %
MONOCYTES ABSOLUTE: 0.8 THOU/MM3 (ref 0.4–1.3)
MORAXELLA CATARRHALIS BY PCR: NOT DETECTED
MYCOPLASMA PNEUMONIAE BY PCR: NOT DETECTED
NON-SARS CORONAVIRUS: NOT DETECTED
NUCLEATED RED BLOOD CELLS: 0 /100 WBC
PARAINFLUENZA VIRUS BY PCR: NOT DETECTED
PLATELET # BLD: 199 THOU/MM3 (ref 130–400)
PMV BLD AUTO: 8.9 FL (ref 9.4–12.4)
POTASSIUM REFLEX MAGNESIUM: 3.4 MEQ/L (ref 3.5–5.2)
PROTEUS SPECIES BY PCR: NOT DETECTED
PSEUDOMONAS AERUGINOSA BY PCR: DETECTED
RBC # BLD: 2.67 MILL/MM3 (ref 4.2–5.4)
RESISTANT GENE CTX-M BY PCR: NOT DETECTED
RESISTANT GENE IMP BY PCR: NOT DETECTED
RESISTANT GENE KPC BY PCR: NOT DETECTED
RESISTANT GENE MECA/C & MREJ BY PCR: ABNORMAL
RESISTANT GENE NDM BY PCR: NOT DETECTED
RESISTANT GENE OXA-48-LIKE BY PCR: ABNORMAL
RESISTANT GENE VIM BY PCR: NOT DETECTED
RESPIRATORY SYNCYTIAL VIRUS BY PCR: NOT DETECTED
RHINOVIRUS ENTEROVIRUS PCR: NOT DETECTED
SEG NEUTROPHILS: 74.9 %
SEGMENTED NEUTROPHILS ABSOLUTE COUNT: 5.8 THOU/MM3 (ref 1.8–7.7)
SERRATIA MARCESCENS BY PCR: NOT DETECTED
SODIUM BLD-SCNC: 133 MEQ/L (ref 135–145)
SOURCE: ABNORMAL
SPECIMEN ACCEPTABILITY: ABNORMAL
STAPH AUREUS BY PCR: NOT DETECTED
STREP AGALACTIAE BY PCR: NOT DETECTED
STREP PNEUMONIAE BY PCR: NOT DETECTED
STREP PYOGENES BY PCR: NOT DETECTED
WBC # BLD: 7.8 THOU/MM3 (ref 4.8–10.8)

## 2021-11-30 PROCEDURE — 0B978ZZ DRAINAGE OF LEFT MAIN BRONCHUS, VIA NATURAL OR ARTIFICIAL OPENING ENDOSCOPIC: ICD-10-PCS | Performed by: INTERNAL MEDICINE

## 2021-11-30 PROCEDURE — 80048 BASIC METABOLIC PNL TOTAL CA: CPT

## 2021-11-30 PROCEDURE — 87150 DNA/RNA AMPLIFIED PROBE: CPT

## 2021-11-30 PROCEDURE — 6370000000 HC RX 637 (ALT 250 FOR IP): Performed by: INTERNAL MEDICINE

## 2021-11-30 PROCEDURE — 87205 SMEAR GRAM STAIN: CPT

## 2021-11-30 PROCEDURE — 87631 RESP VIRUS 3-5 TARGETS: CPT

## 2021-11-30 PROCEDURE — 87798 DETECT AGENT NOS DNA AMP: CPT

## 2021-11-30 PROCEDURE — 99232 SBSQ HOSP IP/OBS MODERATE 35: CPT | Performed by: INTERNAL MEDICINE

## 2021-11-30 PROCEDURE — 6360000002 HC RX W HCPCS: Performed by: REGISTERED NURSE

## 2021-11-30 PROCEDURE — 97530 THERAPEUTIC ACTIVITIES: CPT

## 2021-11-30 PROCEDURE — 87186 SC STD MICRODIL/AGAR DIL: CPT

## 2021-11-30 PROCEDURE — 2060000000 HC ICU INTERMEDIATE R&B

## 2021-11-30 PROCEDURE — 7100000011 HC PHASE II RECOVERY - ADDTL 15 MIN: Performed by: INTERNAL MEDICINE

## 2021-11-30 PROCEDURE — 3609027000 HC BRONCHOSCOPY: Performed by: INTERNAL MEDICINE

## 2021-11-30 PROCEDURE — 87581 M.PNEUMON DNA AMP PROBE: CPT

## 2021-11-30 PROCEDURE — 87070 CULTURE OTHR SPECIMN AEROBIC: CPT

## 2021-11-30 PROCEDURE — 97110 THERAPEUTIC EXERCISES: CPT

## 2021-11-30 PROCEDURE — 31645 BRNCHSC W/THER ASPIR 1ST: CPT | Performed by: INTERNAL MEDICINE

## 2021-11-30 PROCEDURE — 7100000010 HC PHASE II RECOVERY - FIRST 15 MIN: Performed by: INTERNAL MEDICINE

## 2021-11-30 PROCEDURE — 2709999900 HC NON-CHARGEABLE SUPPLY: Performed by: INTERNAL MEDICINE

## 2021-11-30 PROCEDURE — 2580000003 HC RX 258: Performed by: INTERNAL MEDICINE

## 2021-11-30 PROCEDURE — 82948 REAGENT STRIP/BLOOD GLUCOSE: CPT

## 2021-11-30 PROCEDURE — 7100000000 HC PACU RECOVERY - FIRST 15 MIN: Performed by: INTERNAL MEDICINE

## 2021-11-30 PROCEDURE — 87541 LEGION PNEUMO DNA AMP PROB: CPT

## 2021-11-30 PROCEDURE — 6360000002 HC RX W HCPCS: Performed by: PHARMACIST

## 2021-11-30 PROCEDURE — 83615 LACTATE (LD) (LDH) ENZYME: CPT

## 2021-11-30 PROCEDURE — 85025 COMPLETE CBC W/AUTO DIFF WBC: CPT

## 2021-11-30 PROCEDURE — 6370000000 HC RX 637 (ALT 250 FOR IP): Performed by: NURSE PRACTITIONER

## 2021-11-30 PROCEDURE — 2580000003 HC RX 258: Performed by: RADIOLOGY

## 2021-11-30 PROCEDURE — 87206 SMEAR FLUORESCENT/ACID STAI: CPT

## 2021-11-30 PROCEDURE — 99233 SBSQ HOSP IP/OBS HIGH 50: CPT | Performed by: INTERNAL MEDICINE

## 2021-11-30 PROCEDURE — 94761 N-INVAS EAR/PLS OXIMETRY MLT: CPT

## 2021-11-30 PROCEDURE — 87077 CULTURE AEROBIC IDENTIFY: CPT

## 2021-11-30 PROCEDURE — 94640 AIRWAY INHALATION TREATMENT: CPT

## 2021-11-30 PROCEDURE — 87255 GENET VIRUS ISOLATE HSV: CPT

## 2021-11-30 PROCEDURE — 82330 ASSAY OF CALCIUM: CPT

## 2021-11-30 PROCEDURE — 7100000001 HC PACU RECOVERY - ADDTL 15 MIN: Performed by: INTERNAL MEDICINE

## 2021-11-30 PROCEDURE — 83735 ASSAY OF MAGNESIUM: CPT

## 2021-11-30 PROCEDURE — 71045 X-RAY EXAM CHEST 1 VIEW: CPT

## 2021-11-30 PROCEDURE — 87106 FUNGI IDENTIFICATION YEAST: CPT

## 2021-11-30 PROCEDURE — 6360000002 HC RX W HCPCS: Performed by: INTERNAL MEDICINE

## 2021-11-30 PROCEDURE — 84155 ASSAY OF PROTEIN SERUM: CPT

## 2021-11-30 PROCEDURE — 94003 VENT MGMT INPAT SUBQ DAY: CPT

## 2021-11-30 PROCEDURE — 87486 CHLMYD PNEUM DNA AMP PROBE: CPT

## 2021-11-30 PROCEDURE — 0B918ZZ DRAINAGE OF TRACHEA, VIA NATURAL OR ARTIFICIAL OPENING ENDOSCOPIC: ICD-10-PCS | Performed by: INTERNAL MEDICINE

## 2021-11-30 PROCEDURE — 71250 CT THORAX DX C-: CPT

## 2021-11-30 PROCEDURE — 2700000000 HC OXYGEN THERAPY PER DAY

## 2021-11-30 PROCEDURE — 3700000001 HC ADD 15 MINUTES (ANESTHESIA): Performed by: INTERNAL MEDICINE

## 2021-11-30 PROCEDURE — 87102 FUNGUS ISOLATION CULTURE: CPT

## 2021-11-30 PROCEDURE — 82040 ASSAY OF SERUM ALBUMIN: CPT

## 2021-11-30 PROCEDURE — 3700000000 HC ANESTHESIA ATTENDED CARE: Performed by: INTERNAL MEDICINE

## 2021-11-30 RX ORDER — ACETYLCYSTEINE 200 MG/ML
600 SOLUTION ORAL; RESPIRATORY (INHALATION) 2 TIMES DAILY
Status: DISCONTINUED | OUTPATIENT
Start: 2021-11-30 | End: 2021-11-30

## 2021-11-30 RX ORDER — MEPERIDINE HYDROCHLORIDE 25 MG/ML
12.5 INJECTION INTRAMUSCULAR; INTRAVENOUS; SUBCUTANEOUS EVERY 5 MIN PRN
Status: DISCONTINUED | OUTPATIENT
Start: 2021-11-30 | End: 2021-11-30 | Stop reason: HOSPADM

## 2021-11-30 RX ORDER — CLINDAMYCIN PHOSPHATE 600 MG/50ML
600 INJECTION INTRAVENOUS ONCE
Status: COMPLETED | OUTPATIENT
Start: 2021-12-01 | End: 2021-12-01

## 2021-11-30 RX ORDER — PROPOFOL 10 MG/ML
INJECTION, EMULSION INTRAVENOUS PRN
Status: DISCONTINUED | OUTPATIENT
Start: 2021-11-30 | End: 2021-11-30 | Stop reason: SDUPTHER

## 2021-11-30 RX ORDER — PROMETHAZINE HYDROCHLORIDE 25 MG/ML
12.5 INJECTION, SOLUTION INTRAMUSCULAR; INTRAVENOUS
Status: DISCONTINUED | OUTPATIENT
Start: 2021-11-30 | End: 2021-11-30 | Stop reason: HOSPADM

## 2021-11-30 RX ORDER — ACETYLCYSTEINE 200 MG/ML
600 SOLUTION ORAL; RESPIRATORY (INHALATION) 2 TIMES DAILY
Status: DISCONTINUED | OUTPATIENT
Start: 2021-12-01 | End: 2021-12-01 | Stop reason: HOSPADM

## 2021-11-30 RX ORDER — SODIUM CHLORIDE 450 MG/100ML
INJECTION, SOLUTION INTRAVENOUS CONTINUOUS
Status: DISCONTINUED | OUTPATIENT
Start: 2021-12-01 | End: 2021-12-01 | Stop reason: HOSPADM

## 2021-11-30 RX ORDER — DEXAMETHASONE SODIUM PHOSPHATE 4 MG/ML
INJECTION, SOLUTION INTRA-ARTICULAR; INTRALESIONAL; INTRAMUSCULAR; INTRAVENOUS; SOFT TISSUE PRN
Status: DISCONTINUED | OUTPATIENT
Start: 2021-11-30 | End: 2021-11-30 | Stop reason: SDUPTHER

## 2021-11-30 RX ORDER — LABETALOL 20 MG/4 ML (5 MG/ML) INTRAVENOUS SYRINGE
5 EVERY 10 MIN PRN
Status: DISCONTINUED | OUTPATIENT
Start: 2021-11-30 | End: 2021-11-30 | Stop reason: HOSPADM

## 2021-11-30 RX ORDER — FENTANYL CITRATE 50 UG/ML
50 INJECTION, SOLUTION INTRAMUSCULAR; INTRAVENOUS EVERY 5 MIN PRN
Status: DISCONTINUED | OUTPATIENT
Start: 2021-11-30 | End: 2021-11-30 | Stop reason: HOSPADM

## 2021-11-30 RX ORDER — MIDAZOLAM HYDROCHLORIDE 1 MG/ML
1 INJECTION INTRAMUSCULAR; INTRAVENOUS ONCE
Status: COMPLETED | OUTPATIENT
Start: 2021-12-01 | End: 2021-12-01

## 2021-11-30 RX ORDER — ONDANSETRON 2 MG/ML
INJECTION INTRAMUSCULAR; INTRAVENOUS PRN
Status: DISCONTINUED | OUTPATIENT
Start: 2021-11-30 | End: 2021-11-30 | Stop reason: SDUPTHER

## 2021-11-30 RX ORDER — FENTANYL CITRATE 50 UG/ML
25 INJECTION, SOLUTION INTRAMUSCULAR; INTRAVENOUS EVERY 5 MIN PRN
Status: DISCONTINUED | OUTPATIENT
Start: 2021-11-30 | End: 2021-11-30 | Stop reason: HOSPADM

## 2021-11-30 RX ADMIN — METOCLOPRAMIDE HYDROCHLORIDE 5 MG: 5 INJECTION INTRAMUSCULAR; INTRAVENOUS at 01:30

## 2021-11-30 RX ADMIN — SODIUM CHLORIDE: 9 INJECTION, SOLUTION INTRAVENOUS at 15:33

## 2021-11-30 RX ADMIN — ACETAMINOPHEN 650 MG: 325 TABLET ORAL at 23:21

## 2021-11-30 RX ADMIN — PROPOFOL 40 MG: 10 INJECTION, EMULSION INTRAVENOUS at 15:36

## 2021-11-30 RX ADMIN — ONDANSETRON HYDROCHLORIDE 4 MG: 4 INJECTION, SOLUTION INTRAMUSCULAR; INTRAVENOUS at 15:38

## 2021-11-30 RX ADMIN — ALBUTEROL SULFATE 2.5 MG: 2.5 SOLUTION RESPIRATORY (INHALATION) at 18:07

## 2021-11-30 RX ADMIN — CHLORHEXIDINE GLUCONATE 15 ML: 1.2 RINSE ORAL at 22:22

## 2021-11-30 RX ADMIN — ALBUTEROL SULFATE 2.5 MG: 2.5 SOLUTION RESPIRATORY (INHALATION) at 09:03

## 2021-11-30 RX ADMIN — METOCLOPRAMIDE HYDROCHLORIDE 5 MG: 5 INJECTION INTRAMUSCULAR; INTRAVENOUS at 18:58

## 2021-11-30 RX ADMIN — ALBUTEROL SULFATE 2.5 MG: 2.5 SOLUTION RESPIRATORY (INHALATION) at 01:37

## 2021-11-30 RX ADMIN — SODIUM CHLORIDE, PRESERVATIVE FREE 10 ML: 5 INJECTION INTRAVENOUS at 22:22

## 2021-11-30 RX ADMIN — PROPOFOL 30 MG: 10 INJECTION, EMULSION INTRAVENOUS at 15:43

## 2021-11-30 RX ADMIN — SODIUM CHLORIDE: 4.5 INJECTION, SOLUTION INTRAVENOUS at 18:56

## 2021-11-30 RX ADMIN — DEXAMETHASONE SODIUM PHOSPHATE 4 MG: 4 INJECTION, SOLUTION INTRAMUSCULAR; INTRAVENOUS at 15:38

## 2021-11-30 RX ADMIN — METOPROLOL TARTRATE 12.5 MG: 25 TABLET, FILM COATED ORAL at 22:21

## 2021-11-30 RX ADMIN — PHENYLEPHRINE HYDROCHLORIDE 200 MCG: 10 INJECTION INTRAVENOUS at 15:39

## 2021-11-30 RX ADMIN — SODIUM CHLORIDE SOLN NEBU 3% 4 ML: 3 NEBU SOLN at 01:36

## 2021-11-30 RX ADMIN — TIOTROPIUM BROMIDE AND OLODATEROL 2 PUFF: 3.124; 2.736 SPRAY, METERED RESPIRATORY (INHALATION) at 09:03

## 2021-11-30 ASSESSMENT — PULMONARY FUNCTION TESTS
PIF_VALUE: 0
PIF_VALUE: 22
PIF_VALUE: 27
PIF_VALUE: 0
PIF_VALUE: 35
PIF_VALUE: 21
PIF_VALUE: 10
PIF_VALUE: 39
PIF_VALUE: 22
PIF_VALUE: 11
PIF_VALUE: 0
PIF_VALUE: 38
PIF_VALUE: 23
PIF_VALUE: 25
PIF_VALUE: 0
PIF_VALUE: 7
PIF_VALUE: 0
PIF_VALUE: 10
PIF_VALUE: 22
PIF_VALUE: 23
PIF_VALUE: 8
PIF_VALUE: 22
PIF_VALUE: 22

## 2021-11-30 ASSESSMENT — PAIN SCALES - GENERAL
PAINLEVEL_OUTOF10: 0
PAINLEVEL_OUTOF10: 1
PAINLEVEL_OUTOF10: 0

## 2021-11-30 ASSESSMENT — PAIN - FUNCTIONAL ASSESSMENT: PAIN_FUNCTIONAL_ASSESSMENT: 0-10

## 2021-11-30 NOTE — PRE SEDATION
[address] [address] Endoscopy  Sedation Pre-Procedure Note    Patient Name: Hermilo Zheng    YOB: 1950   Room/Bed: [address] [place] [address]  Medical Record Number: [id number]  Date: 11/30/2021  Time: 4:07 PM     Indication:  Pain control for Flexible Fibrooptic Bronchoscopy. Consent: I have discussed with the patient and/or the patient representative the indication, alternatives, and the possible risks and/or complications of the planned procedure and the anesthesia methods. The patient and/or patient representative appear to understand and agree to proceed.     Vital Signs: BP (!) 109/55   Pulse 81   Temp 98.2 °F (36.8 °C) (Temporal)   Resp 16   Ht 4' 9\" (1.448 m)   Wt 167 lb 8.8 oz (76 kg)   SpO2 100%   BMI 36.26 kg/m²       Past Medical History:  Past Medical History:   Diagnosis Date    CHF (congestive heart failure) ([facility])     Dr. Addie Miller    Depression     Gout attack     Hypertension     Osteoarthritis     Pulmonary artery hypertension ([facility])     [facility] Dr. Kj Echevarria-- Dr. Fern Jackson Renal calculi     Dr. Marquise Au [facility])          Allergy:  No Known Allergies      Past Surgical History:  Past Surgical History:   Procedure Laterality Date    APPENDECTOMY  1960    BRONCHOSCOPY N/A 11/22/2021    BRONCHOSCOPY performed by Jacqueline Espoisto MD at [address] N/A 11/24/2021    EGD PEG TUBE PLACEMENT performed by Óscar Mcgrath MD at [facility] Endoscopy    IR [address] [place] [address]  11/26/2021    IR CHEST TUBE INSERTION 11/26/2021 [facility]    IR NEPHROSTOMY PERCUTANEOUS LEFT  11/1/2021    IR NEPHROSTOMY PERCUTANEOUS LEFT 11/1/2021 Sabina Li MD [facility]    PRESSURE ULCER DEBRIDEMENT Right 8/6/2021    EXCISION RIGHT BUTTOCK WOUND AND CLOSURE performed by Tamy Isaac MD at [facility]       Medications:   Current Facility-Administered Medications   Medication Dose Route Frequency Provider Last Rate Last Admin    [START ON 12/1/2021] epoetin prince-epbx (RETACRIT) injection 2,000 Units  2,000 Units SubCUTAneous Once per day on Mon Wed Fri Judie Martinez MD        And    [START ON 12/1/2021] epoetin prince-epbx (RETACRIT) injection 3,000 Units  3,000 Units SubCUTAneous Once per day on Mon Wed Fri Jose Cervantes MD        meperidine (DEMEROL) injection 12.5 mg  12.5 mg IntraVENous Q5 Min PRN Jun Barry Pasion, DO        fentaNYL (SUBLIMAZE) injection 25 mcg  25 mcg IntraVENous Q5 Min PRN Pa Doverion, DO        fentaNYL (SUBLIMAZE) injection 50 mcg  50 mcg IntraVENous Q5 Min PRN Pa Pasion, DO        HYDROmorphone (DILAUDID) injection 0.25 mg  0.25 mg IntraVENous Q5 Min PRN Pa Doverion, DO        HYDROmorphone (DILAUDID) injection 0.5 mg  0.5 mg IntraVENous Q5 Min PRN Jun Doverion, DO        promethazine (PHENERGAN) injection 12.5 mg  12.5 mg IntraMUSCular Once PRN Jun Doverion, DO        labetalol (NORMODYNE;TRANDATE) injection syringe 5 mg  5 mg IntraVENous Q10 Min PRN Jun Doverion, DO        Sodium Hypochlorite (DAKINS) 0.25 % external solution   Irrigation Daily Reddy Astorga DO   Given at 11/29/21 2050    midazolam (VERSED) injection 4 mg  4 mg IntraVENous Once Roma Paget, MD        ketamine (KETALAR) injection 50 mg  50 mg IntraVENous Once Roma Paget, MD        lidocaine PF 1 % injection 20 mL  20 mL IntraDERmal Once Roma Paget, MD        metoprolol tartrate (LOPRESSOR) tablet 12.5 mg  12.5 mg Oral BID Reddy Sas, DO   12.5 mg at 11/29/21 2340    sodium chloride flush 0.9 % injection 5-40 mL  5-40 mL IntraVENous 2 times per day Sophia Bates MD   10 mL at 11/29/21 2050    sodium chloride flush 0.9 % injection 5-40 mL  5-40 mL IntraVENous PRN Sophia Bates MD   10 mL at 11/28/21 1703    0.9 % sodium chloride infusion  25 mL IntraVENous PRN Sophia Bates MD   Stopped at 11/30/21 1550    LORazepam (ATIVAN) injection 1 mg  1 mg IntraVENous Q6H PRN Cindy Trejo MD   1 mg at 11/29/21 0505    lidocaine PF 4 % injection 4 mL  4 mL Inhalation 4x Daily PRN Osie Seen, APRN - CNP   4 mL at 11/21/21 0928    metoclopramide (REGLAN) injection 5 mg  5 mg IntraVENous Q8H [facility]   5 mg at 11/30/21 0130    sodium chloride (Inhalant) 3 % nebulizer solution 4 mL  4 mL Nebulization Q6H Rosana De MD   4 mL at 11/30/21 0136    And    albuterol (PROVENTIL) nebulizer solution 2.5 mg  2.5 mg Nebulization Q6H Rosana De MD   2.5 mg at 11/30/21 0903    ondansetron (ZOFRAN) injection 4 mg  4 mg IntraVENous Q6H PRN Anitha Bring, DO   4 mg at 11/19/21 0956    lactobacillus (CULTURELLE) capsule 1 capsule  1 capsule Per NG tube Daily with breakfast Anitha Bring, DO   1 capsule at 11/29/21 1025    chlorhexidine (PERIDEX) 0.12 % solution 15 mL  15 mL Mouth/Throat BID Osie Seen, APRN - CNP   15 mL at 11/29/21 2050    insulin lispro (HUMALOG) injection vial 0-6 Units  0-6 Units SubCUTAneous Q6H Emigdio Jetty, APRN - CNP   1 Units at 11/23/21 0540    albuterol (PROVENTIL) nebulizer solution 2.5 mg  2.5 mg Nebulization Q4H PRN Cindy Trejo MD   2.5 mg at 11/13/21 0426    allopurinol (ZYLOPRIM) tablet 100 mg  100 mg Oral Daily Herlinda Cleaning, DO   100 mg at 11/29/21 1024    phosphorus replacement protocol   Other RX Placeholder Ignacio Anand, DO        fentaNYL (SUBLIMAZE) injection 25 mcg  25 mcg IntraVENous Q1H PRN Anitha Bring, DO   25 mcg at 11/21/21 0525    potassium bicarb-citric acid (EFFER-K) effervescent tablet 40 mEq  40 mEq Oral Once Anitha Bring, DO        glucose (GLUTOSE) 40 % oral gel 15 g  15 g Oral PRN Kyrie Anand, DO        dextrose 50 % IV solution  12.5 g IntraVENous PRN Anitha Shivani, DO        glucagon (rDNA) injection 1 mg  1 mg IntraMUSCular PRN Ignacio Anand, DO        dextrose 5 % solution 100 mL/hr IntraVENous PRN Luis Tyson, DO   Stopped at 11/25/21 0336    tiotropium-olodaterol (STIOLTO) 2.5-2.5 MCG/ACT inhaler 2 puff  2 puff Inhalation Daily Brandi Anand DO   2 puff at 11/30/21 0903    polyethylene glycol (GLYCOLAX) packet 17 g  17 g Oral Daily PRN Brandi Anand, DO   17 g at 11/16/21 1704    [Held by provider] aspirin chewable tablet 81 mg  81 mg Oral Daily Cindy Trejo MD   81 mg at 11/07/21 0824    senna (SENOKOT) tablet 8.6 mg  1 tablet Oral BID PRN Brandi Anand DO   8.6 mg at 11/09/21 0823    pantoprazole (PROTONIX) injection 40 mg  40 mg IntraVENous QAM Louisa Rhiannon, APRN - CNP   40 mg at 11/29/21 1024    acetaminophen (TYLENOL) tablet 650 mg  650 mg Oral Q4H PRN Sun Rincon MD   650 mg at 11/28/21 1014    [Held by provider] Riociguat TABS 2.5 mg  2.5 mg Oral Q8H Paloma Pavan V, DO   2.5 mg at 11/28/21 1322    FLUoxetine (PROZAC) capsule 40 mg  40 mg Oral Daily Cindy Trejo MD   40 mg at 11/29/21 1024    [Held by provider] 0.9 % sodium chloride infusion   IntraVENous Continuous Cindy Trejo MD 50 mL/hr at 10/28/21 2314 New Bag at 10/28/21 2314         Coumadin Use Last 7 Days:  no  Antiplatelet drug therapy use last 7 days: no  Other anticoagulant use last 7 days: no       Pre-Sedation Documentation and Exam:   I have personally completed a history, physical exam & review of systems for this patient (see notes). Mallampati Airway Assessment:  Please see CRNA note for details. Prior History of Anesthesia Complications:   Please see CRNA note for details. ASA Classification:  Please see CRNA note for details. Sedation/ Anesthesia Plan:   Patient was given anesthesia by CRNA Mr. Leigh Wise from anesthesiology dept. Please see detailed note by CRNA for details.     Gil Ruano MD MD, [facility]  11/30/2021, 4:07 PM    Electronically signed by Gil Ruano MD on 11/30/2021 at 4:07 PM

## 2021-11-30 NOTE — ANESTHESIA PRE PROCEDURE
Department of Anesthesiology  Preprocedure Note       Name:  Clifford Ribeiro   Age:  73 y.o.  :  1950                                          MRN:  [id number]         Date:  2021      Surgeon: Huy Bernard):  Danyel Byrnes MD    Procedure: Procedure(s):  BRONCHOSCOPY WITHOUT FLURO    Medications prior to admission:   Prior to Admission medications    Medication Sig Start Date End Date Taking? Authorizing Provider   metoprolol tartrate (LOPRESSOR) 25 MG tablet Take 0.5 tablets by mouth 2 times daily 10/25/21   LANE Montano CNP   sodium hypochlorite (DAKINS) 0.125 % SOLN external solution Apply Dakin's moistened gauze dressings to wound twice daily and as needed. 21   LANE Todd CNP   sodium chloride 1 g tablet Take 1 tablet by mouth 2 times daily 8/3/21   Darleen Guerrier MD   allopurinol (ZYLOPRIM) 300 MG tablet Take 1 tablet by mouth daily 21   Alexia Thomson MD   FLUoxetine (PROZAC) 40 MG capsule Take 1 capsule by mouth daily 21   Alexia Thomson MD   potassium chloride (KLOR-CON M) 10 MEQ extended release tablet Take 1 tablet by mouth every other day 21   LANE Alarcon CNP   bumetanide (BUMEX) 1 MG tablet Take 1 tablet by mouth daily 21   LANE Alarcon CNP   Multiple Vitamins-Minerals (MULTIVITAMIN WOMEN PO) Take 1 tablet by mouth daily    Historical Provider, MD   acetaminophen (TYLENOL) 650 MG extended release tablet Take 650 mg by mouth every 8 hours as needed for Pain    Historical Provider, MD   Riociguat (ADEMPAS) 2.5 MG TABS Take 2.5 mg by mouth 3 times daily    Historical Provider, MD   docusate sodium (COLACE) 100 MG capsule Take 100 mg by mouth as needed     Historical Provider, MD   aspirin 81 MG tablet Take 81 mg by mouth daily     Historical Provider, MD       Current medications:    No current facility-administered medications for this visit. No current outpatient medications on file. Facility-Administered Medications Ordered in Other Visits   Medication Dose Route Frequency Provider Last Rate Last Admin    [START ON 12/1/2021] epoetin prince-epbx (RETACRIT) injection 2,000 Units  2,000 Units SubCUTAneous Once per day on Mon Wed Fri Sabino Dale MD        And   [facility] [START ON 12/1/2021] epoetin prince-epbx (RETACRIT) injection 3,000 Units  3,000 Units SubCUTAneous Once per day on Mon Wed Fri Jose Cervantes MD        Sodium Hypochlorite (DAKINS) 0.25 % external solution   Irrigation Daily Marisel Heady, DO   Given at 11/29/21 2050    midazolam (VERSED) injection 4 mg  4 mg IntraVENous Once Shell Ramirez MD        ketamine (KETALAR) injection 50 mg  50 mg IntraVENous Once Shell Ramirez MD        lidocaine PF 1 % injection 20 mL  20 mL IntraDERmal Once Shell Ramirez MD        metoprolol tartrate (LOPRESSOR) tablet 12.5 mg  12.5 mg Oral BID Marisel Heady, DO   12.5 mg at 11/29/21 2340    sodium chloride flush 0.9 % injection 5-40 mL  5-40 mL IntraVENous 2 times per day Oscar Way MD   10 mL at 11/29/21 2050    sodium chloride flush 0.9 % injection 5-40 mL  5-40 mL IntraVENous PRN Oscar Way MD   10 mL at 11/28/21 1703    0.9 % sodium chloride infusion  25 mL IntraVENous PRN Oscar Way  mL/hr at 11/28/21 2230 25 mL at 11/28/21 2230    LORazepam (ATIVAN) injection 1 mg  1 mg IntraVENous Q6H PRN Cindy Trejo MD   1 mg at 11/29/21 0505    lidocaine PF 4 % injection 4 mL  4 mL Inhalation 4x Daily PRN LANE Pacheco - CNP   4 mL at 11/21/21 0928    metoclopramide (REGLAN) injection 5 mg  5 mg IntraVENous Q8H Tildon Angelucci, RPH   5 mg at 11/30/21 0130    sodium chloride (Inhalant) 3 % nebulizer solution 4 mL  4 mL Nebulization Q6H Bashir Hughes MD   4 mL at 11/30/21 0136    And    albuterol (PROVENTIL) nebulizer solution 2.5 mg  2.5 mg Nebulization Q6H Bashir Hughes MD   2.5 mg at 11/30/21 0903    ondansetron (ZOFRAN) injection 4 mg  4 mg IntraVENous Q6H PRN Debbie Chough, DO   4 mg at 11/19/21 0956    lactobacillus (CULTURELLE) capsule 1 capsule  1 capsule Per NG tube Daily with breakfast Debbie Volodymyr, DO   1 capsule at 11/29/21 1025    chlorhexidine (PERIDEX) 0.12 % solution 15 mL  15 mL Mouth/Throat BID Maia Cm APRN - CNP   15 mL at 11/29/21 2050    insulin lispro (HUMALOG) injection vial 0-6 Units  0-6 Units SubCUTAneous Q6H Bennie Cornelio APRN - CNP   1 Units at 11/23/21 0540    albuterol (PROVENTIL) nebulizer solution 2.5 mg  2.5 mg Nebulization Q4H PRN Cindy Trejo MD   2.5 mg at 11/13/21 0426    allopurinol (ZYLOPRIM) tablet 100 mg  100 mg Oral Daily Herlinda Cleaning DO   100 mg at 11/29/21 1024    phosphorus replacement protocol   Other RX Placeholder Home Anand, DO        fentaNYL (SUBLIMAZE) injection 25 mcg  25 mcg IntraVENous Q1H PRN Debbie Volodymyr, DO   25 mcg at 11/21/21 0525    potassium bicarb-citric acid (EFFER-K) effervescent tablet 40 mEq  40 mEq Oral Once Debbie Chokarley, DO        glucose (GLUTOSE) 40 % oral gel 15 g  15 g Oral PRN Rodney Anand, DO        dextrose 50 % IV solution  12.5 g IntraVENous PRN Debbie Volodymyr, DO        glucagon (rDNA) injection 1 mg  1 mg IntraMUSCular PRN Debbie Helm, DO        dextrose 5 % solution  100 mL/hr IntraVENous PRN Debbie Volodymyr, DO   Stopped at 11/25/21 0336    tiotropium-olodaterol (STIOLTO) 2.5-2.5 MCG/ACT inhaler 2 puff  2 puff Inhalation Daily Rad Anand DO   2 puff at 11/30/21 0903    polyethylene glycol (GLYCOLAX) packet 17 g  17 g Oral Daily PRN Rodney Anand DO   17 g at 11/16/21 1704    [Held by provider] aspirin chewable tablet 81 mg  81 mg Oral Daily Cindy Trejo MD   81 mg at 11/07/21 0824    senna (SENOKOT) tablet 8.6 mg  1 tablet Oral BID PRN Rodney Anand DO   8.6 mg at 11/09/21 0823    pantoprazole (PROTONIX) injection 40 mg  40 mg IntraVENous ALLI Peterson, APRN - CNP 40 mg at 11/29/21 1024    acetaminophen (TYLENOL) tablet 650 mg  650 mg Oral Q4H PRN Moisés Kay MD   650 mg at 11/28/21 1014    [Held by provider] Riociguat TABS 2.5 mg  2.5 mg Oral Q8H Paloma Pavan V, DO   2.5 mg at 11/28/21 1322    FLUoxetine (PROZAC) capsule 40 mg  40 mg Oral Daily Cindy Trejo MD   40 mg at 11/29/21 1024    [Held by provider] 0.9 % sodium chloride infusion   IntraVENous Continuous Cindy Trejo MD 50 mL/hr at 10/28/21 2314 New Bag at 10/28/21 2314       Allergies:  No Known Allergies    Problem List:    Patient Active Problem List   Diagnosis Code    HTN (hypertension) I10    Chronic depression F32. A    CHF (congestive heart failure) (HCC) I50.9    Thrombocytopenia ([facility]) D69.6    Moderate episode of recurrent major depressive disorder ([facility]) F33.1    Renal failure syndrome N19    Hyperkalemia E87.5    Isolated non-nephrotic proteinuria R80.0    ALVIN (acute kidney injury) ([facility] [address] N17.9    Chronic right-sided heart failure (HCC) I50.812    Pulmonary HTN ([facility]) I27.20    Hypotension due to hypovolemia I95.89, E86.1    Acute renal failure superimposed on stage 4 chronic kidney disease (HCC) N17.9, N18.4    Pressure ulcer of right buttock, stage 3 (HCC) L89.313    Acute respiratory failure (HCC) J96.00    Flash pulmonary edema (HCC) J81.0    Shock (HCC) R57.9    Aspiration pneumonia of left lung (HCC) J69.0    Pleural effusion J90    Paroxysmal atrial fibrillation (HCC) I48.0    Hemoptysis R04.2    Chronic respiratory failure with hypoxia ([facility]) J96.11    Pneumothorax, right J93.9       Past Medical History:        Diagnosis Date    CHF (congestive heart failure) ([facility])     Dr. Alia Mathews    Depression     Gout attack     Hypertension     Osteoarthritis     Pulmonary artery hypertension ([facility] [address]     [employer]-- Dr. Juan A Russell-- Dr. Tiesha Anguiano Renal calculi     Dr. Lauren Pimentel Thrombocytopenia [facility])        Past Surgical History:        Procedure Laterality Date    APPENDECTOMY 1960    BRONCHOSCOPY N/A 11/22/2021    BRONCHOSCOPY performed by Michael Lew MD at [address] N/A 11/24/2021    EGD PEG TUBE PLACEMENT performed by Francine Car MD at [facility] Endoscopy    IR [address]  11/26/2021    IR CHEST TUBE INSERTION 11/26/2021 [facility]    IR NEPHROSTOMY PERCUTANEOUS LEFT  11/1/2021    IR NEPHROSTOMY PERCUTANEOUS LEFT 11/1/2021 Char Garcia MD [facility]    PRESSURE ULCER DEBRIDEMENT Right 8/6/2021    EXCISION RIGHT BUTTOCK WOUND AND CLOSURE performed by Ran Reynoso MD at [address] [facility] History:    Social History     Tobacco Use    Smoking status: Never Smoker    Smokeless tobacco: Never Used   Substance Use Topics    Alcohol use: No                                Counseling given: Not Answered      Vital Signs (Current): There were no vitals filed for this visit.                                            BP Readings from Last 3 Encounters:   11/30/21 (!) 118/57   11/24/21 139/65   11/22/21 (!) 89/52       NPO Status:                                                                                 BMI:   Wt Readings from Last 3 Encounters:   11/30/21 167 lb 8.8 oz (76 kg)   10/25/21 164 lb (74.4 kg)   09/29/21 160 lb (72.6 kg)     There is no height or weight on file to calculate BMI.    CBC:   Lab Results   Component Value Date    WBC 7.8 11/30/2021    RBC 2.67 11/30/2021    RBC 4.15 01/09/2018    HGB 7.7 11/30/2021    HCT 25.4 11/30/2021    MCV 95.1 11/30/2021    RDW 12.9 01/09/2018     11/30/2021       CMP:   Lab Results   Component Value Date     11/30/2021    K 3.4 11/30/2021    CL 98 11/30/2021    CO2 24 11/30/2021    BUN 35 11/30/2021    CREATININE 2.7 11/30/2021    LABGLOM 17 11/30/2021    GLUCOSE 101 11/30/2021    GLUCOSE 103 01/09/2018    PROT 5.2 11/23/2021    CALCIUM 8.5 11/30/2021    BILITOT 0.3 11/23/2021    ALKPHOS 115 11/23/2021

## 2021-11-30 NOTE — PROGRESS NOTES
HOSPITALIST PROGRESS NOTE    Patient - Jean Pierre Graff   MRN -  [id number]   Thomas # - [de-identified]   - 1950      Date of Admission -  10/27/2021  1:36 PM  Date of evaluation -  2021  [facility]   Hospital Day - 29  Primary Care Physician - Bette Brower MD   Code Status: FULL CODE    Chief Complaint:    Shortness of breath     History Of Presenting Illness:     71F admitted to [facility] 10/27/21 w/ SOB. Current smoker w/ PMH PAH grp 3, HFpEF, stage III sacral ulcer s/p wound ostomy . Patient sister reports SpO2 51% at home and was brought to ED where ABG showed pH 7.19, PCO2 80, PO2 134. She required emergent intubation and was transferred to ICU. Workup revealed left sided pleural effusion and underwent US guided thoracentesis w/ 1100 cc removed consistent w/ MRSA/adenovirus. Patient was noted to be in afib/RVR and was placed on amio, heparin drip. Patient was also noted to have normocytic anemia and received 1 unit PRBC 10/31/21. CT abdomen revealed CBD dilation w/ cholelithiasis.      2021: Plan for nephrostomy tubes today. Hemoglobin 7.7 s/p 1 unit PRBC yesterday. Weaned to 4 mics Levophed. 2021: Patient resumed back on prior dose of vancomycin. Low urine output w/ increasing pressor requirements today  2021: Will attempt SBT if able to wean today. Increased Amio drip 2/2 worsening tachycardia. Continued hematuria w/ low urine output. Cr stable. Will evaluate UTI following perc tube placement. Rise in WBC noted. Culture pending  2021: Cardioversion on 11/3/2021. Now and NSR. CVP 29 this a.m. Suspect drop in CI 2/2 to stopping levophed. Diffuse edema following transfusion overnight. Given one-time dose of 2 mg Bumex and albumin. SBT this AM  2021: Failed SBT yesterday. Continued low urine output. Trial of Bumex today. Will proceed with dialysis if fails to improve.   2021: Started on CRRT, continues to have bloody drainage of nephrostomy tube given 1 unit PRBC, cefipime emperically pending cultures of nephrostomy tube fluid and repeat resp cultures. 11/07/21: Patient remains clinically well on exam. We'll continue medical of UF with CRRT. On empiric cefepime for coverage of perinephric abscess noted on CT abdomen and pelvis November 4, 2021. White count trending down. Will attempt spontaneous breathing trial in a.m.  11/08/21: Transfused 1x PRBC this AM. Anemia workup pending. WBC trending down. Patient ventilator settings this AM preclude SBT. Volume overloaded on exam. UF increased to 125 cc/hr this AM. Will attempt to wean vent settings further further. Coag neg staph growing on nephrostomy tube aspirate. Suspect contamination. Continue existing ABx  11/09/21: Pulse dose steroids initiated overnight. Started on SSI. Urine output now improving 75cc/hr. Transfused 3 units PRBCs yesterday for Improve DO2. MERCEDES noted w/ 875 mg deficit. Will replace once steroids/ABx completed. 11/10/21: Extubated overnight. SLP eval today. Continue BiPAP while asleep w/ transition to NC while awake as tolerated. No tachypnea. Day 3 pulse dose steroids. Continued bloody drainage from nephrostomy tube. Holding [facility]   11/11/21: Worsening bilateral infiltrates with SpO2 <90% on HFNC. Continued pulmonary hygiene w/ nebulized hypertonic saline, acapella and breathing treatments in addition to deep suctioning. Continued on Vancomycin for MRSA PNA. Plan for family meeting this AM to discuss goals of care. 11/12/21: Follow-up discussion from family meeting. Patient nodded agreement with full code status including reintubation leading to tracheostomy and PEG placement and continued hemodialysis if needed. Patient nodded agreement and understanding with these decisions. CXR yesterday evening did show left mainstem mucus plugging with cutoff sign and absent breath sounds and did undergo emergent bronchoscopy overnight on 11/12/21.  Diminished breath sounds and worsening respiratory status this AM concerning for continued plugging. Stat chest X-ray ordered for confirmation. 11/13/21: Respiratory status continues to decline, she is requiring BiPAP with FiO2 of 70%. Chest x-ray shows complete opacification of the left lung secondary to mucous plugging, also the patient has left-sided pleural effusion. Plan for bronchoscopy. 11/15/2021: Family meeting today per event note. Reintubated today after failed BiPAP  11/16/2021: will dc abx tomorrow. PNA panel negative. Plan for tracheostomy time permitting today. Weaning phenylephrine as tolerated. 11/17/2021: Necrotic Stage IV pressure ulcer probing to bone noted this AM. CT abdomen and pelvis pending to evaluate for abscess. Will discuss with family today when available. 11/18/2021: CT showed bony involvement of sacral ulcer w/ concern for osteomyelitis. Wound ostomy following. Culture pending. Started on broad spectrum therapy with Vancomycin and Zosyn for coverage of suspect osteomyelitis. Weaning sedation as tolerated  11/19/21: Wound culture pending. On broad spectrum ABx for coverage of osteomyelitis. Will de-escalate pending culture/sensitiivity. Consult to GI for PEG tube evaluation. 11/21/21: Tolerated conventional HD on 11/20/21 w/ 2500cc removed. Wound culture growing gram negative bacilli. Vanc discontinued. Weaned off phenylephrine. Will monitor overnight. Okay to transfer to step down in AM  11/22/21: FiO2 30%, rate of 8 L/min. Remains on tube feeds on day 5 Zosyn. Bronchoscopy for hemoptysis unremarkable findings of old bloody residual likely from trach procedure. 11/23/21: Remains on tube feeds on day 6 Zosyn. Creatinine bumped up to 3.5 today with Na low at 124. Nephrology performed HD today with tunneled cath next. GI planning for PEG tube placement tomorrow. 11/24/21: 7-day Zosyn completed. PEG tube placed by GI.  11/25/21: Labs looking a little worse, will be due for another hemodialysis session tomorrow.  Otherwise well.   11/26/21: Medial pneumothorax noted on Chest XR in the AM, subsequent CT chest confirmed 20% being the size. Patient was taken down to IR for chest tube placement which was successful. Attached to suction -20 mmHg. Patient also underwent hemodialysis today. Overall, still with a lot going on. Was told that patient is #2 in line for a bed at [facility] but given her ongoing acuity of care  11/27/21: Medial pneumothorax improved on repeat chest XR, chest tube in place. AM labs and kidney function looked a lot better today s/p HD yesterday. 11/28/21: Required bronchoscopy as there was left lung opacification resulting from recurrent mucus plug. 2 units platelets transfused. 11/29/21: Chest tube removed. Urology evaluated patient and don't plan for intervention for staghorn calculus at this time. Still with left sided. HD received. 11/30/21: See below  Subjective (Last 24 Hours):    Patient laying in bed comfortably this morning with no complaints or concerns. Still with total left-sided white out but stable and unchanged hemodynamically. No urgent need for bronch. Pulm following, awaiting CT chest to determine next move. All other ROS negative.    Medications    Current Medications    Sodium Hypochlorite   Irrigation Daily    midazolam  4 mg IntraVENous Once    ketamine  50 mg IntraVENous Once    lidocaine PF  20 mL IntraDERmal Once    metoprolol tartrate  12.5 mg Oral BID    sodium chloride flush  5-40 mL IntraVENous 2 times per day    metoclopramide  5 mg IntraVENous Q8H    sodium chloride (Inhalant)  4 mL Nebulization Q6H    And    albuterol  2.5 mg Nebulization Q6H    lactobacillus  1 capsule Per NG tube Daily with breakfast    chlorhexidine  15 mL Mouth/Throat BID    insulin lispro  0-6 Units SubCUTAneous Q6H    allopurinol  100 mg Oral Daily    phosphorus replacement protocol   Other RX Placeholder    potassium bicarb-citric acid  40 mEq Oral Once    tiotropium-olodaterol  2 puff Inhalation Daily    [Held by provider] aspirin  81 mg Oral Daily    pantoprazole  40 mg IntraVENous QAM    [Held by provider] Riociguat  2.5 mg Oral Q8H    FLUoxetine  40 mg Oral Daily     sodium chloride flush, sodium chloride, LORazepam, lidocaine PF, ondansetron, albuterol, fentanNYL, glucose, dextrose, glucagon (rDNA), dextrose, polyethylene glycol, senna, acetaminophen  IV Drips/Infusions   sodium chloride 25 mL (11/28/21 2230)    dextrose Stopped (11/25/21 0336)    [Held by provider] sodium chloride 50 mL/hr at 10/28/21 2314     Diet    Diet NPO  Allergies    Patient has no known allergies. Vitals     height is 4' 9\" (1.448 m) and weight is 167 lb 8.8 oz (76 kg). Her axillary temperature is 99.3 °F (37.4 °C). Her blood pressure is 108/54 (abnormal) and her pulse is 85. Her respiration is 18 and oxygen saturation is 98%. Body mass index is 36.26 kg/m². SUPPLEMENTAL O2: O2 Flow Rate (L/min): 10 L/min   Input/Output     Intake/Output Summary (Last 24 hours) at 11/30/2021 0942  Last data filed at 11/30/2021 0910  Gross per 24 hour   Intake 1931 ml   Output 2122 ml   Net -191 ml     I/O last 3 completed shifts: In: 4740 [I.V.:20; NG/GT:1076]  Out: 2122 [Urine:120; Stool:250; Chest Tube:1]   Patient Vitals for the past 96 hrs (Last 3 readings):   Weight   11/30/21 0300 167 lb 8.8 oz (76 kg)   11/29/21 1555 165 lb 12.6 oz (75.2 kg)   11/29/21 1200 168 lb 10.4 oz (76.5 kg)     Physical Exam   General: Acute on chronically ill-appearing elderly white female on trach ventilation   HEENT: Temporal wasting appreciated. Normocephalic and atraumatic. No scleral icterus. PERR  Neck: supple. No Thyromegaly. Left subclavian dialysis catheter   Lungs: Mild diffuse rhonchi appreciated bilaterally No retractions. , diminished on the Left  Cardiac: RRR. No JVD. Abdomen: soft.   Nontender,nephrostomy tube with bloody drainage  Extremities: +1 pitting edema x4. (interval improvement noted) No clubbing, cyanosis Vasculature: capillary refill < 3 seconds. Palpable dorsalis pedis pulses. Skin: warm and dry. Psych: Alert and oriented to person place and time, affect appropriate  Lymph:  No supraclavicular adenopathy. Neurologic: No focal deficit. No seizures. Labs   ABG  Lab Results   Component Value Date    PH 7.36 11/13/2021    PO2 59 11/13/2021    PCO2 45 11/13/2021    HCO3 26 11/13/2021    O2SAT 89 11/13/2021     Lab Results   Component Value Date    IFIO2 70 11/13/2021    MODE PC 11/05/2021    SETPEEP 8.0 11/15/2021     CBC  Recent Labs     11/28/21  0320 11/29/21  0500 11/30/21  0505   WBC 7.3 6.0 7.8   RBC 2.66* 2.53* 2.67*   HGB 7.9* 7.4* 7.7*   HCT 25.5* 24.4* 25.4*   MCV 95.9 96.4 95.1   MCH 29.7 29.2 28.8   MCHC 31.0* 30.3* 30.3*    239 199   MPV 8.9* 9.1* 8.9*      BMP  Recent Labs     11/28/21  0320 11/29/21  0500 11/30/21  0505   * 132* 133*   K 3.7 3.6 3.4*   CL 96* 96* 98   CO2 26 25 24   BUN 42* 51* 35*   CREATININE 2.9* 3.5* 2.7*   GLUCOSE 107 108 101   MG 1.6 1.8 1.7   CALCIUM 8.4* 8.2* 8.5     LFT  No results for input(s): AST, ALT, ALB, BILITOT, ALKPHOS, LIPASE in the last 72 hours. Invalid input(s): AMYLASE  TROP  Lab Results   Component Value Date    TROPONINT 0.084 10/28/2021    TROPONINT 0.059 10/27/2021    TROPONINT 0.037 03/06/2020     BNP  No results for input(s): BNP in the last 72 hours. Lactic Acid  No results for input(s): LACTA in the last 72 hours. INR  No results for input(s): INR, PROTIME in the last 72 hours. PTT  No results for input(s): APTT in the last 72 hours.   Glucose  Recent Labs     11/29/21  1433 11/29/21  1736 11/29/21  2336   POCGLU 102 98 118*     Microbiology     Procedure Component Value Units Date/Time   Culture, Blood 2 [[id number]] Collected: 11/27/21 0748   Order Status: Sent Specimen: Blood Updated: 11/27/21 0828   Culture, Blood 1 [[id number]] Collected: 11/27/21 0755   Order Status: Sent Specimen: Blood Updated: 11/27/21 0827     Procedures   - Hemodialysis on 11/23/21   - Hemodialysis on 11/26/21   - Chest Tube placed on 11/27/21   - Hemodialysis on 11/29/21     Imaging:      Chest XR 11/30/21:    Problem List      Active Hospital Problems    Diagnosis Date Noted    Pneumothorax, right [J93.9]     Paroxysmal atrial fibrillation ([address] [I48.0] 11/22/2021    Hemoptysis [R04.2]     Chronic respiratory failure with hypoxia (HCC) [J96.11]     Aspiration pneumonia of left lung ([address] [J69.0]     Pleural effusion [J90]     Shock ([address] [R57.9]     Flash pulmonary edema ([address] [J81.0]     Acute respiratory failure ([address] [J96.00] 10/27/2021    Pressure ulcer of right buttock, stage 3 ([address] [L89.313] 07/29/2021    Pulmonary HTN ([address] [I27.20] 09/03/2019      Assessment & Plan:   1. Subacute combined hypercapnic and hypoxic respiratory failure: D/t to pneumonia with L-sided pleural effusion and repeated mucous plugging. Intubated 10/27/21. Extubated following successful SBT on 11/9/21. Failed HFNC and was reintubated on 11/15/21. Tracheostomy placed 11/17/21.     - Pulmonology following    - Wean as tolerated   - Lung protective strategies    - Aspiration precautions    2. Left Pulmonary Mucus Plug: Asymptomatic, opacity/complete opacification on 11/28 CXR (relatively asymptomatic)         - Concern for recurrent mucus plug.         - D/w Pulmonary - s/p bronchoscopy with mucus plug in left mainstem bronchus   - Remains with total opacification on the L s/p bronch 11/29 seen on repeat chest XR   - Pulm on board, plan for repeat CT Chest - still pending    - Appreciate pulm recs, would like resolution of total opacification prior to DC to [place]  3. Stage III ALVIN on CKD 3: Suspect post renal etiology 2/2 staghorn calculi. Nephrology following. CRRT 11/5/21 until 11/12/21. Anti-DS-DNA negative, Anti histone negative, Anti Smith,and  Anti-Savana WNL, GBM antibodies negative, C3/C4 levels normal, elevated kappa/lambda free light chains with normal ratio.  HD started 11/20/21. Positive for ANCA associated vasculitis. ANCA associated Abs show elevated anti MPO elevated 416 and serine proteinase 3 IgG WNL   - Plans for biopsy per nephrology when stable   - CVVH stopped 11/15/21 significant oliguria since that time   - Hemodialysis on 11/29 done with good response   4. Dysphagia: 2/2 prolonged intubation. PEG tube placed on 11/24/21. Protonix daily   5. Acute blood loss anemia with chronic normocytic anemia: 2/2 hematuria s/p perc tube placement w/ Iron studies consistent w/ MERCEDES vs Anemia chronic disease. B12 WNL, Folate low, Abs reticulocyte index 11/18/21 0.49% Soluable transferrin 139. Calculated Iron deficit 827mg. PRBC x1 transfused 10/31/21, 11/4/21, 11/5/21, x2 11/6/21, x3 11/8/21, x3 11/15/21.    - Heparin stopped (dialysis dose still running)   - Continue holding ASA    - Venofer, replace folate once ABx stopped  6. 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 Probes to bone, necrosis noted around wound site. CT abdomen and pelvis showing possible mi involvement with concern for osteomyelitis. Seen by general surgery on 11/17/21. No indications for debridement at the time. Poor candidate for surgery given multiple co-morbidities, malnutrition and poor wound healing. Wound cultures from 11/19 growing proteus mirabilis and pseudomonas aeruginosa. - Wound care following, continue Dakins w/ dressing changes   - Rectal tube in place, continue     - Completed 10-day course of IV Zosyn 11/28/21  7. ANCA associated Vasculitis: workup per above. Will need Renal Bx for confirmation once stable. Discussed case w/ Nephrology. Completed pulse dose steroids starting on 11/8/21 w/ 10mg/kg methylprednisolone for 3 days. SSI started for coverage. Not candidate for Rituxin/TPE 2/2 existing infection.    8. Hemoptysis: (resolved): Bronchoscopy on 11/22 remarkable for old blood stained mucus in RLL. No active bleed. - Pulm following, appreciate recs     - Jose Alfredo 300 mg by nebulization twice daily  9. Hypokalemia: Resolved. 3.4 on 11/24. Potassium replacement protocol in place. 10. Hyponatremia: In setting of worsening CKD. On HD q2-3 days. Monitor with daily BMP   11. Hematuria: Resolved. s/p percutaneous tube placement. CBI per above. Stopped heparin, holding ASA. 12. Pneumothorax on Right - 20% in size:  Resolved. Likely 2/2 PPV. Chest XR on 11/26 showed stable appearance of right pneumothorax of about 10% as measured on the AP radiograph. Subsequent CT chest showed multiloculated pneumothorax right side approximately 20% in size. Chest tube was place by IR on the right on 11/26, removed on 11/29/21. Repeat chest XR 11/27 reveals improvement in pneumothorax   13. Hydronephrosis: Resolved. 2/2 large staghorn calculi. S/p nephrostomy tube placement on left still some bloodstained discharge  14. Suspected COPD: current every day smoker. Obstructive process noted on flow volume loop on ventilator. Started empiric therapy with Stiolto. Recommend formal PFT once improved  15. PAH/chronic RV failure NYHA II: EF 55 to 60% G2 DD TTE 5/4/2021. RHC showed [place] [address] with elevated PCWP. Treated w/ Riociguat. 16. Chronic tobacco use:  cessation  17. Gout: Continue allopurinol  18. BARBER: non compliant w/ CPAP   19. Septic Shock: Resolved. 2/2 severe PNA now concern for necrotizing wound on sacrum. Initial CI 6.3 w/ NE precludes concern for septic shock NE d/c 2/2 worsening afib RVR  20. Thrombocytopenia: Resolved. Suspect consumptive process in context of sepsis. Not on heparin   21. Severe bilateral multiorganism pneumonia: Resolved. PCR positive Staph w/ MECA gene and adenovirus consistent w/ MRSA colonization. Staph completed 14 days Vancomycin (started 10/28/21) and completed 5 days Rocephin.  Clindamycin started for coverage of PVL as patient clinically worsening following 14 days Vancomycin for MRSA coverage. Underwent bronchoscopy for left mainstem mucus plug noted CXR on 11/11/21. Switched to Clindamycin for coverage of PVL received 8 days therapy (started 11/11/21). PNA panel from 11/15/21 negative. Repeat cultures show no growth    PT/OT Eval Status: Daily PT    VTE prophylaxis: [] Lovenox                                 [x] SCDs                                 [] SQ Heparin                                 [] Encourage ambulation           [] Already on Anticoagulation     IV Fluids: None   Diet:  PEG tube   Code Status: Full Code  Disposition: [facility] once bed available & stable.  Guarded long term prognosis     Tele:   [x] yes             [] no    Electronically signed by   Rajeev Retana DO on 11/30/2021 at 9:42 AM

## 2021-11-30 NOTE — PROGRESS NOTES
[facility]      Patient - Lorelei Corado   MRN -  [id number]   Acct # - [de-identified]   - 1950      Date of Admission -  10/27/2021  1:36 PM  Date of evaluation -  2021  Room - -012-A   Hospital Day - [facility], DO Primary Care Physician - Lashawn Rodriguez MD     Problem List      Active Hospital Problems    Diagnosis Date Noted    Pneumothorax, right [J93.9]     Paroxysmal atrial fibrillation ([address] [I48.0] 2021    Hemoptysis [R04.2]     Chronic respiratory failure with hypoxia (HCC) [J96.11]     Aspiration pneumonia of left lung ([address] [J69.0]     Pleural effusion [J90]     Shock ([address] [R57.9]     Flash pulmonary edema ([address] [J81.0]     Acute respiratory failure ([address] [J96.00] 10/27/2021    Pressure ulcer of right buttock, stage 3 ([address] [L89.313] 2021    Pulmonary HTN ([address] [I27.20] 2019     Reason for Consult    Vent management, Hemoptysis  HPI   History Obtained From: Patient's niece at bedside and electronic medical record. Lorelei Corado is a 73 y.o. female never smoker with PMHx chronic diastolic CHF (EF 71-47%, Q2FP per TTE 21) / NYHA II / chronic RV failure / severe PAH (PA 85/31 - mean 52 / PVR 10), mild AS (valve area 1.7 sq cm), morbid obesity, HTN and depression -- presents to [facility] with a chief complaint of shortness of breath. History obtained from the EMR (patient intubated / sedated on the ventilator).     Patient presented to [facility] ED after her sister noticed the patient was experiencing worsening shortness of breath throughout the day / recorded SpO2 51% on home pulse oximeter prior to arrival. The patient has a known history of CHF and PAH, for which she follows with Dr. Nikolas Herrera (cardiology) and [facility] / currently on adempas. Per report, she was recently discontinued from oral bumex, which she stopped taking just this morning.  She endorsed mild headache on arrival (poorly characterized), but no associated fevers/chills, chest pain, palpitations, cough, n/v/d, abdominal pain, urinary symptoms, weakness or syncope were reported. Limited additional history available. Per patient's niece the patient was sedentary at home before admission to the hospital.     Patient was intubated on 10/27/21. Extubated following successful SBT on 11/9/21. Status post bronchoscopy with mucous plug removal from the left mainstem on 11/13. Failed HFNC and was reintubated on 11/15/21. Tracheostomy tube was placed on 11/17/21. She is having shortness of breath: No  Functional status prior to beginning of symptoms: 0 block/s on level ground. Current functional capacity on level ground: 0 block/s on level ground. She can climb steps: No  Flights of steps she can climb: 0    She is having cough: Yes  Duration of cough: for 30+ days. Her cough is associated with sputum production: Yes   The sputum color: white  Hemoptysis:Yes    She is having chest pain:No      Past 24 hrs   -Remains on the ventilator   -Saturating 97% on 30% FiO2, 16/6  -CT chest this morning shows complete collapse of left lung with large left pleural effusion and moderate right pleural effusion   -Still very weak. -No fever or chills.   -S/p right-sided chest tube, no air leak noticed, set to water seal   -No acute distress or complaints   -No overnight events    All other systems reviewed    PMHx   Past Medical History      Diagnosis Date    CHF (congestive heart failure) ([facility])     Dr. Chato Carrizales Depression     Gout attack     Hypertension     Osteoarthritis     Pulmonary artery hypertension ([facility]     [facility]-- Dr. Darryl Favre-- Dr. Chato Carrizales Renal calculi     Dr. Ashley Hunter [facility])       Past Surgical History        Procedure Laterality Date    APPENDECTOMY  1960    BRONCHOSCOPY N/A 11/22/2021    BRONCHOSCOPY performed by eNlli Pineda MD at [address] PLACEMENT N/A 11/24/2021    EGD PEG TUBE PLACEMENT performed by Myra Corrales MD at [facility]    IR [address]  11/26/2021    IR CHEST TUBE INSERTION 11/26/2021 [facility]    IR NEPHROSTOMY PERCUTANEOUS LEFT  11/1/2021    IR NEPHROSTOMY PERCUTANEOUS LEFT 11/1/2021 MD [facility]    PRESSURE ULCER DEBRIDEMENT Right 8/6/2021    EXCISION RIGHT BUTTOCK WOUND AND CLOSURE performed by Clarita Rios MD at [address] [place] [address]    Current Medications    Sodium Hypochlorite   Irrigation Daily    midazolam  4 mg IntraVENous Once    ketamine  50 mg IntraVENous Once    lidocaine PF  20 mL IntraDERmal Once    metoprolol tartrate  12.5 mg Oral BID    sodium chloride flush  5-40 mL IntraVENous 2 times per day    metoclopramide  5 mg IntraVENous Q8H    sodium chloride (Inhalant)  4 mL Nebulization Q6H    And    albuterol  2.5 mg Nebulization Q6H    lactobacillus  1 capsule Per NG tube Daily with breakfast    chlorhexidine  15 mL Mouth/Throat BID    insulin lispro  0-6 Units SubCUTAneous Q6H    allopurinol  100 mg Oral Daily    phosphorus replacement protocol   Other RX Placeholder    potassium bicarb-citric acid  40 mEq Oral Once    tiotropium-olodaterol  2 puff Inhalation Daily    [Held by provider] aspirin  81 mg Oral Daily    pantoprazole  40 mg IntraVENous QAM    [Held by provider] Riociguat  2.5 mg Oral Q8H    FLUoxetine  40 mg Oral Daily     sodium chloride flush, sodium chloride, LORazepam, lidocaine PF, ondansetron, albuterol, fentanNYL, glucose, dextrose, glucagon (rDNA), dextrose, polyethylene glycol, senna, acetaminophen  IV Drips/Infusions   sodium chloride 25 mL (11/28/21 2230)    dextrose Stopped (11/25/21 0336)    [Held by provider] sodium chloride 50 mL/hr at 10/28/21 2314     Home Medications  Medications Prior to Admission: metoprolol tartrate (LOPRESSOR) 25 MG tablet, Take 0.5 tablets by mouth 2 times daily  sodium hypochlorite (DAKINS) 0. 125 % SOLN external solution, Apply Dakin's moistened gauze dressings to wound twice daily and as needed. sodium chloride 1 g tablet, Take 1 tablet by mouth 2 times daily  allopurinol (ZYLOPRIM) 300 MG tablet, Take 1 tablet by mouth daily  FLUoxetine (PROZAC) 40 MG capsule, Take 1 capsule by mouth daily  potassium chloride (KLOR-CON M) 10 MEQ extended release tablet, Take 1 tablet by mouth every other day  bumetanide (BUMEX) 1 MG tablet, Take 1 tablet by mouth daily  Multiple Vitamins-Minerals (MULTIVITAMIN WOMEN PO), Take 1 tablet by mouth daily  acetaminophen (TYLENOL) 650 MG extended release tablet, Take 650 mg by mouth every 8 hours as needed for Pain  Riociguat (ADEMPAS) 2.5 MG TABS, Take 2.5 mg by mouth 3 times daily  docusate sodium (COLACE) 100 MG capsule, Take 100 mg by mouth as needed   aspirin 81 MG tablet, Take 81 mg by mouth daily   Diet    Diet NPO  Allergies    Patient has no known allergies. Social History     Social History     Socioeconomic History    Marital status: Single     Spouse name: Not on file    Number of children: 0    Years of education: Not on file    Highest education level: Not on file   Occupational History    Not on file   Tobacco Use    Smoking status: Never Smoker    Smokeless tobacco: Never Used   Vaping Use    Vaping Use: Never used   Substance and Sexual Activity    Alcohol use: No    Drug use: No    Sexual activity: Not Currently   Other Topics Concern    Not on file   Social History Narrative    Not on file     Social Determinants of Health     Financial Resource Strain: Low Risk     Difficulty of Paying Living Expenses: Not very hard   Food Insecurity: No Food Insecurity    Worried About Running Out of Food in the Last Year: Never true    Traci of Food in the Last Year: Never true   Transportation Needs:     Lack of Transportation (Medical): Not on file    Lack of Transportation (Non-Medical):  Not on file   Physical Activity:     Days of Exercise per Week: Not on file    Minutes of Exercise per Session: Not on file   Stress:     Feeling of Stress : Not on file   Social Connections:     Frequency of Communication with Friends and Family: Not on file    Frequency of Social Gatherings with Friends and Family: Not on file    Attends Buddhism Services: Not on file    Active Member of Clubs or Organizations: Not on file    Attends Club or Organization Meetings: Not on file    Marital Status: Not on file   Intimate Partner Violence:     Fear of Current or Ex-Partner: Not on file    Emotionally Abused: Not on file    Physically Abused: Not on file    Sexually Abused: Not on file   Housing Stability:     Unable to Pay for Housing in the Last Year: Not on file    Number of Places Lived in the Last Year: Not on file    Unstable Housing in the Last Year: Not on file     Family History          Problem Relation Age of Onset    Diabetes Father     Arthritis Mother     COPD Mother     Diabetes Sister     Heart Disease Maternal Uncle     Breast Cancer Niece 36    Sleep Apnea Brother     Asthma Neg Hx     Birth Defects Neg Hx     Cancer Neg Hx     Depression Neg Hx     Early Death Neg Hx     Hearing Loss Neg Hx     High Blood Pressure Neg Hx     High Cholesterol Neg Hx     Kidney Disease Neg Hx     Learning Disabilities Neg Hx     Mental Illness Neg Hx     Mental Retardation Neg Hx     Miscarriages / Stillbirths Neg Hx     Stroke Neg Hx     Substance Abuse Neg Hx     Vision Loss Neg Hx     Other Neg Hx      Sleep History    Never diagnosed with sleep apnea in the past.  Occupational history   Occupation:  She is current working: No  Type of profession: unemployed, disabled.                      History of tobacco smoking:No     History of recreational or IV drug use in the past:NO     History of exposure to coal mines/coal dust: NO  History of exposure to foundry dust/welding: NO  History of exposure to quarry/silica/sandblasting: NO  History of exposure to asbestos/working with breaks/ships: NO  History of exposure to farm dust: NO  History of recent travel to long distances: NO  History of exposure to birds, pigeons, or chickens in the past:NO    History of pulmonary embolism in the past: No            History of DVT in the past:No            Vitals     height is 4' 9\" (1.448 m) and weight is 167 lb 8.8 oz (76 kg). Her axillary temperature is 99.3 °F (37.4 °C). Her blood pressure is 108/54 (abnormal) and her pulse is 85. Her respiration is 18 and oxygen saturation is 98%. Body mass index is 36.26 kg/m². SUPPLEMENTAL O2: O2 Flow Rate (L/min): 10 L/min     I/O        Intake/Output Summary (Last 24 hours) at 11/30/2021 0920  Last data filed at 11/30/2021 0910  Gross per 24 hour   Intake 1931 ml   Output 2122 ml   Net -191 ml     I/O last 3 completed shifts: In: 6590 [I.V.:20; NG/GT:1076]  Out: 2122 [Urine:120; Stool:250; Chest Tube:1]   Patient Vitals for the past 96 hrs (Last 3 readings):   Weight   11/30/21 0300 167 lb 8.8 oz (76 kg)   11/29/21 1555 165 lb 12.6 oz (75.2 kg)   11/29/21 1200 168 lb 10.4 oz (76.5 kg)       Exam   Physical Exam   Constitutional: No distress on vent via trach. Patient appears moderately built. Head: Normocephalic and atraumatic. Mouth/Throat: Oropharynx is clear and moist.  No oral thrush. Eyes: Conjunctivae are normal. Pupils are equal, round. No scleral icterus. Neck: Neck supple. 7.5 mm Bivona portex in place  Cardiovascular: S1 and S2 with no murmur. No peripheral edema  Pulmonary/Chest: Normal effort with bilateral air entry, wheezing heard over left lung. No stridor. No respiratory distress. Patient exhibits no tenderness. Right sided chest tube in place. Abdominal: Soft. Bowel sounds audible. No distension or tenderness to palp.    Musculoskeletal: Moves all extremities  Neurological: Patient is alert and follows simple commands      Labs  - Old records and notes have been reviewed in [facility]   ABG  Lab Results   Component Value Date    PH 7.36 11/13/2021    PO2 59 11/13/2021    PCO2 45 11/13/2021    HCO3 26 11/13/2021    O2SAT 89 11/13/2021     Lab Results   Component Value Date    IFIO2 70 11/13/2021    MODE PC 11/05/2021    SETPEEP 8.0 11/15/2021     CBC  Recent Labs     11/28/21  0320 11/29/21  0500 11/30/21  0505   WBC 7.3 6.0 7.8   RBC 2.66* 2.53* 2.67*   HGB 7.9* 7.4* 7.7*   HCT 25.5* 24.4* 25.4*   MCV 95.9 96.4 95.1   MCH 29.7 29.2 28.8   MCHC 31.0* 30.3* 30.3*    239 199   MPV 8.9* 9.1* 8.9*      BMP  Recent Labs     11/28/21  0320 11/29/21  0500 11/30/21  0505   * 132* 133*   K 3.7 3.6 3.4*   CL 96* 96* 98   CO2 26 25 24   BUN 42* 51* 35*   CREATININE 2.9* 3.5* 2.7*   GLUCOSE 107 108 101   MG 1.6 1.8 1.7   CALCIUM 8.4* 8.2* 8.5     LFT  No results for input(s): AST, ALT, ALB, BILITOT, ALKPHOS, LIPASE in the last 72 hours. Invalid input(s): AMYLASE  TROP  Lab Results   Component Value Date    TROPONINT 0.084 10/28/2021    TROPONINT 0.059 10/27/2021    TROPONINT 0.037 03/06/2020     BNP  No results for input(s): BNP in the last 72 hours. Lactic Acid  No results for input(s): LACTA in the last 72 hours. INR  No results for input(s): INR, PROTIME in the last 72 hours. PTT  No results for input(s): APTT in the last 72 hours. Glucose  Recent Labs     11/29/21  1433 11/29/21  1736 11/29/21  2336   POCGLU 102 98 118*     UA No results for input(s): SPECGRAV, PHUR, COLORU, CLARITYU, MUCUS, PROTEINU, BLOODU, RBCUA, WBCUA, BACTERIA, NITRU, GLUCOSEU, BILIRUBINUR, UROBILINOGEN, KETUA, LABCAST, LABCASTTY, AMORPHOS in the last 72 hours. Invalid input(s): CRYSTALS.     PFTs           Sleep studies   Study Results  Initial Study Date -  11/7/19  AHI -  5.8   TotalEvents - 32  (Apneas  19  Hypopneas 13  Central  0)  LM w/Arousals - 0  Sleep Efficiency - 70.8 % (Total Sleep Time - 330 min)  Time with Sats below 88% - 0 min    Cultures    -Anaerobic and aerobic culture: (From Coccyx) Positive for Proteus mirabilis and Pseudomonas aeruginosa, culture also yielded very light growth of Staphylococcus species (coagulase negative), and additional enteric gram negative bacilli  -Respiratory culture on 11/11/21 of bronchial washing:No bacteria seen. -Molecular pneumonia panel of sputum on 11/15/2021: Negative  -AFB culture with smear: negative  -pneumonia panel of bronchial washings 11/13/2021: Positive for staph aureus and resistant gene meca/c & mrej  -Body fluid culture on 11/5/2021: positive for Staph epidermidis, very light growth Isolates of Methicillin Resistant Staphylococcus coagulase negative (MRSE)  -Respiratory culture on 11/5/2021: Positive for staphylococcal aureus,  EKG     Echocardiogram   Complete 2D ECHO with doppler with color 10/27/21:  Mitral Valve   The mitral valve structure was normal with normal leaflet separation. DOPPLER: The transmitral velocity was within the normal range with no   evidence for mitral stenosis. There was no evidence of mitral   regurgitation. Aortic Valve   The aortic valve was trileaflet with normal thickness and cuspal   separation. DOPPLER: Transaortic velocity was within the normal range with   no evidence of aortic stenosis. There was no evidence of aortic   regurgitation. Tricuspid Valve   The tricuspid valve structure was normal with normal leaflet separation. DOPPLER: There was no evidence of tricuspid stenosis. There was trace   tricuspid regurgitation. Pulmonic Valve   The pulmonic valve leaflets exhibited normal thickness, no calcification,   and normal cuspal separation. DOPPLER: The transpulmonic velocity was   within the normal range with no evidence for regurgitation. Left Atrium   Left atrial size was normal.      Left Ventricle   Normal left ventricular size and systolic function. There were no regional wall motion abnormalities. Wall thickness was within normal limits.    Ejection fraction was estimated at 60-65%. Right Atrium   Right atrial size was normal.      Right Ventricle   The right ventricular size was normal with normal systolic function and   wall thickness. Pericardial Effusion   The pericardium was normal in appearance with a small, non-hemodynamically   significant pericardial effusion. Prominent pericardial fat pad. Pleural Effusion   No evidence of pleural effusion. Aorta / Great Vessels   IVC size is dilated with reduced respiratory phasic changes (CVP~10-15   mmHg). Radiology    CXR  PORT CXR 11/29/21  Complete opacification left hemithorax with volume loss. Resolution right pneumothorax and improved right consolidation. PORT CXR 11/19/21:  There is a Dobbhoff tube which projects over the stomach. There is a left-sided percutaneous nephrostomy tube, stable compared to prior CT. 1. Mild hepatomegaly. Left subclavian dialysis catheter with tip at cavoatrial junction. NG tube passes into stomach. Tracheostomy tube in good position. Right jugular line with catheter tip in right atrium. 2. Tiny bilateral pleural effusions. Moderate pneumonia/pulmonary edema scattered relatively diffusely in both lungs. 3. Overall appearance of chest has worsened somewhat since prior.       CT Scans  (See actual reports for details)    CT chest without contrast on 11/1/21: There are moderate bilateral pleural effusions which have mildly increased in size in the interval. There is adjacent atelectasis. Pulmonary vessels are prominent, similar to prior exam. There are groundglass opacities adjacent to the pulmonary vessels. The heart is enlarged, similar to prior exam. There is a small pericardial effusion, decreased compared to prior exam. Redemonstration of percutaneous cardiac pacer leads and enteric tube. The endotracheal tube is stable with tip in the distal trachea.   There is marked thyromegaly which is nodular in appearance, stable compared to prior exam. Visualized upper abdominal solid organs are grossly unremarkable. There are stable degenerative changes of the thoracic spine with exaggerated thoracic kyphosis. Assessment   -Left hemithorax opacification with volume loss   -Large left sided pleural effusion   -Moderate right pleural effusion   -Recurrent bilateral pleural effusions   -Right lower lobe atelectasis   -S/p bronchoscopy on 11/28/21 with mucous plug removal from left lung   -Hemoptysis in the setting of tracheostomy tube--resolved  -Acute hypoxic respiratory failure secondary to severe multi-organism pneumonia with bilateral pleural effusion and repeated mucous plugging   Intubated 10/27/21, extubated 11/9/21, reintubated 11/15/21, tracheostomy tube placed 11/17/21  -Right-sided pneumothorax. Status post chest tube 11/26/2021  -S/p bronchoscopy with mucous plug removal from the left mainstem on 11/13/21 by Dr. Wagner Restrepo in ICU  -Severe bilateral multiorganism pneumonia: received 14 days of vancomycin, 5 days of rocephin, clinidamycin for 8 days   ANCA associated vasculitis: elevated anti MPO elevated 416 and serine proteinase 3 IgG WNL  -Stage II ALVIN on CKD 3 on HD started 11/20/21  -Hydronephrosis 2/2 Left-sided staghorn calculi  -Acute blood loss anemia: PRBC x1 transfused 10/31/21, 11/4/21, 11/5/21, x2 11/6/21, x3 11/8/21, x3 11/15/21. Heparin stopped (dialysis dose last ran on 11/11/21). -Thrombocytopenia   -Hematuria   -BARBER noncompliant with CPAP     Plan   -Plan for bronchoscopy without fluoroscopy for therapeutic washings and mucous plug removal today   -NPO and hold all anticoagulation  -IR to place left sided chest tube for pleural effusion   -Right chest tube to water seal followed by a CXR after 1 hour  -Follow pending cultures   -SBT trials as tolerated   -Metaneb  -Mucomyst neb BID   -Aspiration precautions   -Cautious use of tracheal suction to reduce risk of suction trauma   -Wean FiO2 as tolerated to maintain saturation above 90%.   -Patient to be transferred to [place]   Questions and concerns addressed.     Electronically signed by   Alexandrea Whtye DO on 11/30/2021 at 9:20 AM

## 2021-11-30 NOTE — PROGRESS NOTES
Comprehensive Nutrition Assessment    Type and Reason for Visit:  Reassess    Nutrition Recommendations/Plan:   When able to restart TF, suggest restart Nepro at 30ml/hr (goal) as tolerated. Bolus 1 proteinex 2.5 ounces daily. If no IVF, flush 240ml free water TID (okay's by Dr Gino Francois on 11/22/21). Monitor TF tolerance , labs & wts. Nutrition Assessment:    Pt.not improving from a nutritional standpoint AEB TF on hold for a bronch today per RN. .  At risk for further nutrition compromise r/t admitted with acute respiratory failure, intubated 10/27, extubated 11/9, reintubated 11/15, AFib, ALVIN, CRRT & HD this admit,  anemia, , previous TF intolerances this admit, PEG tube placement 11/24/21, pneumothorax, Pneumonia, PAF, Aspiration Pneumonia of Left Lung, Pleural Effusion, Shock, Flash Pulmonary Edema, Dysphagia underlying medical condition (CKD,current smoker, CHF, gout,pulmonary HTN, obesity).  Nutrition recommendations/interventions as per above  Malnutrition Assessment:  Malnutrition Status: At risk for malnutrition (Comment)    Context:  Acute Illness     Findings of the 6 clinical characteristics of malnutrition:  Energy Intake:   (NPO for bronch on (11/30))  Weight Loss:  No significant weight loss     Body Fat Loss:  No significant body fat loss     Muscle Mass Loss:  No significant muscle mass loss    Fluid Accumulation:  Unable to assess     Strength:  Not Performed    Estimated Daily Nutrient Needs:  Energy (kcal):  9005-9071 kcals (15-18); Weight Used for Energy Requirements:  Admission (75.1 kg)     Protein (g):  63-84 gm (1.5-2) as renal status allows (wounds/HD); Weight Used for Protein Requirements:  Ideal        Fluid (ml/day):  ~1500 ml (HD); Method Used for Fluid Requirements:  Other (Comment)      Nutrition Related Findings:  TF on hold for bronch today per RN. HD ended 55 minutes early due to nonfunctioning dialysis cath noted (11/29).  Pt vent via trach & nods no when asked if feels nauseated. Labs: (11/30) Na 133, K+ 3.4, BUN 35, Creatinine 2.7, Hemoglobin 7.7. Meds: Humalog, Cultuelle, Reglan, Zofran, Glycolax, Senokot. (11/29-11/30)[phone] had  250ml rectal tube output,  BM noted (11/30)    Wounds:   (unstageable wound sacrum, DTI coccyx)       Current Nutrition Therapies:    Diet NPO  ADULT TUBE FEEDING; PEG; Renal Formula; Continuous; 30; No; 30; Q 4 hours; Protein; proteinex 2.5 ounces daily  Current Tube Feeding (TF) Orders:  · Feeding Route: PEG (placement 11/24/21)  · Formula: Renal Formula (Nepro)  · Schedule:  (on hold for bronch (11/30) per RN, goal is 30ml/hr)  · Additives/Modulars:  (flush 1 ( 2.5 oz) liquid protein daily)  · Water Flushes: 240 ml TID if no IVF  · Current TF & Flush Orders Provides: on hold for bronch (11/30)  · Goal TF & Flush Orders Provides: Nepro carb steady at 30 ml/hr with 2.5 oz.  Proteinex daily provides pt. with 1378 kcals (1274 TF, 104 modular), 84 gm protein (58 TF, 26 modular), 115 gm CHO, 18 gm fiber, 1243 ml free water (523 TF, 720 flushes) in 1515 ml volume (720 TF, 75 modular, 720 flushes)      Anthropometric Measures:  · Height: 4' 9\" (144.8 cm)  · Current Body Weight: 167 lb 8.8 oz (76 kg) (edema not documented)   · Admission Body Weight: 165 lb 9.1 oz (75.1 kg) (10/27 +1 edema)    · Usual Body Weight: 175 lb 11.3 oz (79.7 kg) (per EMR 7/16/21)     · Ideal Body Weight: 85 lbs; 194.8 %   · BMI: 36.2  · Adjusted Body Weight:  ;  (IBW ~93 #)   · BMI Categories: Obese Class 2 (BMI 35.0 -39.9) (on admit)       Nutrition Diagnosis:   · Inadequate oral intake related to swallowing difficulty as evidenced by NPO or clear liquid status due to medical condition, nutrition support - enteral nutrition      Nutrition Interventions:   Food and/or Nutrient Delivery:   (Restart TF as appropriate ( pt NPO for bronch 11/30 per RN))  Nutrition Education/Counseling:  Education not appropriate   Coordination of Nutrition Care:  Continue to monitor while inpatient    Goals:  Tube feeding to provide 75% or more of estimated nutrient needs until able to transition to oral diet during LOS       Nutrition Monitoring and Evaluation:   Behavioral-Environmental Outcomes:  None Identified   Food/Nutrient Intake Outcomes:  Enteral Nutrition Intake/Tolerance  Physical Signs/Symptoms Outcomes:  Biochemical Data, Chewing or Swallowing, GI Status, Fluid Status or Edema, Nutrition Focused Physical Findings, Skin, Weight     Discharge Planning:     Too soon to determine     Electronically signed by Pancho Serrano RD, RIGOBERTO on 11/30/21 at 12:17 PM EST    Contact: [phone]

## 2021-11-30 NOTE — PROGRESS NOTES
[facility]      Patient - Karl Ching   MRN -  [id number]   [facility] # - [de-identified]   - 1950      Date of Admission -  10/27/2021  1:36 PM  Date of evaluation -  2021  Room - -12[facility]   Hospital Day - [facility] Primary Care Physician - Maxwell Lake MD     Problem List      Active Hospital Problems    Diagnosis Date Noted    Pneumothorax, right [J93.9]     Paroxysmal atrial fibrillation ([address] [I48.0] 2021    Hemoptysis [R04.2]     Chronic respiratory failure with hypoxia (HCC) [J96.11]     Aspiration pneumonia of left lung ([address] [J69.0]     Pleural effusion [J90]     Shock ([address] [R57.9]     Flash pulmonary edema ([address] [J81.0]     Acute respiratory failure ([address] [J96.00] 10/27/2021    Pressure ulcer of right buttock, stage 3 ([address] [L89.313] 2021    Pulmonary HTN ([address] [I27.20] 2019     Reason for Consult    Vent management, Hemoptysis  HPI   History Obtained From: Patient's niece at bedside and electronic medical record. Karl Ching is a 73 y.o. female never smoker with PMHx chronic diastolic CHF (EF 26-93%, G4IN per TTE 21) / NYHA II / chronic RV failure / severe PAH (PA 85/31 - mean 52 / PVR 10), mild AS (valve area 1.7 sq cm), morbid obesity, HTN and depression -- presents to [facility] with a chief complaint of shortness of breath. History obtained from the EMR (patient intubated / sedated on the ventilator).     Patient presented to [facility] ED after her sister noticed the patient was experiencing worsening shortness of breath throughout the day / recorded SpO2 51% on home pulse oximeter prior to arrival. The patient has a known history of CHF and PAH, for which she follows with Dr. Fransisco Liu (cardiology) and TYL[facility] Gross 1154 / currently on adempas. Per report, she was recently discontinued from oral bumex, which she stopped taking just this morning.  She endorsed mild headache on arrival (poorly characterized), but no associated fevers/chills, chest pain, palpitations, cough, n/v/d, abdominal pain, urinary symptoms, weakness or syncope were reported. Limited additional history available. Per patient's niece the patient was sedentary at home before admission to the hospital.     Patient was intubated on 10/27/21. Extubated following successful SBT on 11/9/21. Status post bronchoscopy with mucous plug removal from the left mainstem on 11/13. Failed HFNC and was reintubated on 11/15/21. Tracheostomy tube was placed on 11/17/21. She is having shortness of breath: No  Functional status prior to beginning of symptoms: 0 block/s on level ground. Current functional capacity on level ground: 0 block/s on level ground. She can climb steps: No  Flights of steps she can climb: 0    She is having cough: Yes  Duration of cough: for 30+ days. Her cough is associated with sputum production: Yes   The sputum color: white  Hemoptysis:Yes    She is having chest pain:No      Past 24 hrs   -The patient tolerated T piece well today.  -Saturating 95% on 30% FiO2, 16/6  -CXR this morning shows complete opacification of left lung   -Still very weak. -No fever or chills.   -S/p right-sided chest tube, no air leak noticed     All other systems reviewed    PMHx   Past Medical History      Diagnosis Date    CHF (congestive heart failure) (HCC)     Dr. Cece Lylse Depression     Gout attack     Hypertension     Osteoarthritis     Pulmonary artery hypertension ([address]     [facility]-- Dr. Alaina Sung-- Dr. Cece Lyles Renal calculi     Dr. Jillian Willard [facility])       Past Surgical History        Procedure Laterality Date    APPENDECTOMY  1960    BRONCHOSCOPY N/A 11/22/2021    BRONCHOSCOPY performed by Clementine Almonte MD at [address] N/A 11/24/2021    EGD PEG TUBE PLACEMENT performed by Faina Cruz MD at [address] [facility] 11/26/2021    IR CHEST TUBE INSERTION 11/26/2021 [facility]    IR NEPHROSTOMY PERCUTANEOUS LEFT  11/1/2021    IR NEPHROSTOMY PERCUTANEOUS LEFT 11/1/2021 Brittny Steele MD [facility]    PRESSURE ULCER DEBRIDEMENT Right 8/6/2021    EXCISION RIGHT BUTTOCK WOUND AND CLOSURE performed by Ryley Sena MD at [address] [facility]    Current Medications    heparin (porcine)        Sodium Hypochlorite   Irrigation Daily    midazolam  4 mg IntraVENous Once    ketamine  50 mg IntraVENous Once    lidocaine PF  20 mL IntraDERmal Once    metoprolol tartrate  12.5 mg Oral BID    sodium chloride flush  5-40 mL IntraVENous 2 times per day    metoclopramide  5 mg IntraVENous Q8H    sodium chloride (Inhalant)  4 mL Nebulization Q6H    And    albuterol  2.5 mg Nebulization Q6H    lactobacillus  1 capsule Per NG tube Daily with breakfast    chlorhexidine  15 mL Mouth/Throat BID    insulin lispro  0-6 Units SubCUTAneous Q6H    allopurinol  100 mg Oral Daily    phosphorus replacement protocol   Other RX Placeholder    potassium bicarb-citric acid  40 mEq Oral Once    tiotropium-olodaterol  2 puff Inhalation Daily    [Held by provider] aspirin  81 mg Oral Daily    pantoprazole  40 mg IntraVENous QAM    [Held by provider] Riociguat  2.5 mg Oral Q8H    FLUoxetine  40 mg Oral Daily     sodium chloride flush, sodium chloride, LORazepam, lidocaine PF, ondansetron, albuterol, fentanNYL, glucose, dextrose, glucagon (rDNA), dextrose, polyethylene glycol, senna, acetaminophen  IV Drips/Infusions   sodium chloride 25 mL (11/28/21 2230)    dextrose Stopped (11/25/21 1866)    [Held by provider] sodium chloride 50 mL/hr at 10/28/21 2314     Home Medications  Medications Prior to Admission: metoprolol tartrate (LOPRESSOR) 25 MG tablet, Take 0.5 tablets by mouth 2 times daily  sodium hypochlorite (DAKINS) 0.125 % SOLN external solution, Apply Dakin's moistened gauze dressings to wound twice daily and as needed. sodium chloride 1 g tablet, Take 1 tablet by mouth 2 times daily  allopurinol (ZYLOPRIM) 300 MG tablet, Take 1 tablet by mouth daily  FLUoxetine (PROZAC) 40 MG capsule, Take 1 capsule by mouth daily  potassium chloride (KLOR-CON M) 10 MEQ extended release tablet, Take 1 tablet by mouth every other day  bumetanide (BUMEX) 1 MG tablet, Take 1 tablet by mouth daily  Multiple Vitamins-Minerals (MULTIVITAMIN WOMEN PO), Take 1 tablet by mouth daily  acetaminophen (TYLENOL) 650 MG extended release tablet, Take 650 mg by mouth every 8 hours as needed for Pain  Riociguat (ADEMPAS) 2.5 MG TABS, Take 2.5 mg by mouth 3 times daily  docusate sodium (COLACE) 100 MG capsule, Take 100 mg by mouth as needed   aspirin 81 MG tablet, Take 81 mg by mouth daily   Diet    ADULT TUBE FEEDING; PEG; Renal Formula; Continuous; 30; No; 240; Q 8 hours  Allergies    Patient has no known allergies. Social History     Social History     Socioeconomic History    Marital status: Single     Spouse name: Not on file    Number of children: 0    Years of education: Not on file    Highest education level: Not on file   Occupational History    Not on file   Tobacco Use    Smoking status: Never Smoker    Smokeless tobacco: Never Used   Vaping Use    Vaping Use: Never used   Substance and Sexual Activity    Alcohol use: No    Drug use: No    Sexual activity: Not Currently   Other Topics Concern    Not on file   Social History Narrative    Not on file     Social Determinants of Health     Financial Resource Strain: Low Risk     Difficulty of Paying Living Expenses: Not very hard   Food Insecurity: No Food Insecurity    Worried About Running Out of Food in the Last Year: Never true    Traci of Food in the Last Year: Never true   Transportation Needs:     Lack of Transportation (Medical): Not on file    Lack of Transportation (Non-Medical):  Not on file   Physical Activity:     Days of Exercise per Week: Not on file    Minutes of Exercise per Session: Not on file   Stress:     Feeling of Stress : Not on file   Social Connections:     Frequency of Communication with Friends and Family: Not on file    Frequency of Social Gatherings with Friends and Family: Not on file    Attends Sikh Services: Not on file    Active Member of Clubs or Organizations: Not on file    Attends Club or Organization Meetings: Not on file    Marital Status: Not on file   Intimate Partner Violence:     Fear of Current or Ex-Partner: Not on file    Emotionally Abused: Not on file    Physically Abused: Not on file    Sexually Abused: Not on file   Housing Stability:     Unable to Pay for Housing in the Last Year: Not on file    Number of Places Lived in the Last Year: Not on file    Unstable Housing in the Last Year: Not on file     Family History          Problem Relation Age of Onset    Diabetes Father     Arthritis Mother     COPD Mother     Diabetes Sister     Heart Disease Maternal Uncle     Breast Cancer Niece 36    Sleep Apnea Brother     Asthma Neg Hx     Birth Defects Neg Hx     Cancer Neg Hx     Depression Neg Hx     Early Death Neg Hx     Hearing Loss Neg Hx     High Blood Pressure Neg Hx     High Cholesterol Neg Hx     Kidney Disease Neg Hx     Learning Disabilities Neg Hx     Mental Illness Neg Hx     Mental Retardation Neg Hx     Miscarriages / Stillbirths Neg Hx     Stroke Neg Hx     Substance Abuse Neg Hx     Vision Loss Neg Hx     Other Neg Hx      Sleep History    Never diagnosed with sleep apnea in the past.  Occupational history   Occupation:  She is current working: No  Type of profession: unemployed, disabled.                      History of tobacco smoking:No     History of recreational or IV drug use in the past:NO     History of exposure to coal mines/coal dust: NO  History of exposure to foundry dust/welding: NO  History of exposure to quarry/silica/sandblasting: NO  History of exposure to asbestos/working with breaks/ships: NO  History of exposure to farm dust: NO  History of recent travel to long distances: NO  History of exposure to birds, pigeons, or chickens in the past:NO    History of pulmonary embolism in the past: No            History of DVT in the past:No            Vitals     height is 4' 9\" (1.448 m) and weight is 165 lb 12.6 oz (75.2 kg). Her axillary temperature is 99.8 °F (37.7 °C). Her blood pressure is 119/56 (abnormal) and her pulse is 78. Her respiration is 18 and oxygen saturation is 98%. Body mass index is 35.88 kg/m². SUPPLEMENTAL O2: O2 Flow Rate (L/min): 10 L/min     I/O        Intake/Output Summary (Last 24 hours) at 11/29/2021 2007  Last data filed at 11/29/2021 1625  Gross per 24 hour   Intake 1622 ml   Output 2004 ml   Net -382 ml     I/O last 3 completed shifts: In: 778.1 [NG/GT:660; IV Piggyback:118.1]  Out: 157 [Urine:150; Chest Tube:7]   Patient Vitals for the past 96 hrs (Last 3 readings):   Weight   11/29/21 1555 165 lb 12.6 oz (75.2 kg)   11/29/21 1200 168 lb 10.4 oz (76.5 kg)   11/29/21 0445 167 lb 5.3 oz (75.9 kg)       Exam   Physical Exam   Constitutional: No distress on vent via trach. Patient appears moderately built. Head: Normocephalic and atraumatic. Mouth/Throat: Oropharynx is clear and moist.  No oral thrush. Eyes: Conjunctivae are normal. Pupils are equal, round. No scleral icterus. Neck: Neck supple. 7.5 mm Bivona portex in place  Cardiovascular: S1 and S2 with no murmur. No peripheral edema  Pulmonary/Chest: Normal effort with bilateral air entry, wheezing heard over left lung. No stridor. No respiratory distress. Patient exhibits no tenderness. Abdominal: Soft. Bowel sounds audible. No distension or tenderness to palp.    Musculoskeletal: Moves all extremities  Neurological: Patient is alert and follows simple commands      Labs  - Old records and notes have been reviewed in [facility]   ABG  Lab Results   Component Value Date    PH 7.36 11/13/2021    PO2 59 11/13/2021    PCO2 45 11/13/2021    HCO3 26 11/13/2021    O2SAT 89 11/13/2021     Lab Results   Component Value Date    IFIO2 70 11/13/2021    MODE PC 11/05/2021    SETPEEP 8.0 11/15/2021     CBC  Recent Labs     11/27/21  0420 11/28/21  0320 11/29/21  0500   WBC 7.3 7.3 6.0   RBC 2.66* 2.66* 2.53*   HGB 7.9* 7.9* 7.4*   HCT 25.3* 25.5* 24.4*   MCV 95.1 95.9 96.4   MCH 29.7 29.7 29.2   MCHC 31.2* 31.0* 30.3*    242 239   MPV 8.7* 8.9* 9.1*      BMP  Recent Labs     11/27/21  0420 11/28/21  0320 11/29/21  0500   * 131* 132*   K 3.9 3.7 3.6   CL 99 96* 96*   CO2 26 26 25   BUN 30* 42* 51*   CREATININE 2.5* 2.9* 3.5*   GLUCOSE 113* 107 108   MG 1.7 1.6 1.8   CALCIUM 8.4* 8.4* 8.2*     LFT  No results for input(s): AST, ALT, ALB, BILITOT, ALKPHOS, LIPASE in the last 72 hours. Invalid input(s): AMYLASE  TROP  Lab Results   Component Value Date    TROPONINT 0.084 10/28/2021    TROPONINT 0.059 10/27/2021    TROPONINT 0.037 03/06/2020     BNP  No results for input(s): BNP in the last 72 hours. Lactic Acid  No results for input(s): LACTA in the last 72 hours. INR  No results for input(s): INR, PROTIME in the last 72 hours. PTT  No results for input(s): APTT in the last 72 hours. Glucose  Recent Labs     11/29/21  0511 11/29/21  1433 11/29/21  1736   POCGLU 112* 102 98     UA No results for input(s): SPECGRAV, PHUR, COLORU, CLARITYU, MUCUS, PROTEINU, BLOODU, RBCUA, WBCUA, BACTERIA, NITRU, GLUCOSEU, BILIRUBINUR, UROBILINOGEN, KETUA, LABCAST, LABCASTTY, AMORPHOS in the last 72 hours. Invalid input(s): CRYSTALS.     PFTs           Sleep studies   Study Results  Initial Study Date -  11/7/19  AHI -  5.8   TotalEvents - 32  (Apneas  19  Hypopneas 13  Central  0)  LM w/Arousals - 0  Sleep Efficiency - 70.8 % (Total Sleep Time - 330 min)  Time with Sats below 88% - 0 min    Cultures    -Anaerobic and aerobic culture: (From Coccyx) Positive for Proteus mirabilis and Pseudomonas aeruginosa, culture also yielded very light growth of Staphylococcus species (coagulase negative), and additional enteric gram negative bacilli  -Respiratory culture on 11/11/21 of bronchial washing:No bacteria seen. -Molecular pneumonia panel of sputum on 11/15/2021: Negative  -AFB culture with smear: negative  -pneumonia panel of bronchial washings 11/13/2021: Positive for staph aureus and resistant gene meca/c & mrej  -Body fluid culture on 11/5/2021: positive for Staph epidermidis, very light growth Isolates of Methicillin Resistant Staphylococcus coagulase negative (MRSE)  -Respiratory culture on 11/5/2021: Positive for staphylococcal aureus,  EKG     Echocardiogram   Complete 2D ECHO with doppler with color 10/27/21:  Mitral Valve   The mitral valve structure was normal with normal leaflet separation. DOPPLER: The transmitral velocity was within the normal range with no   evidence for mitral stenosis. There was no evidence of mitral   regurgitation. Aortic Valve   The aortic valve was trileaflet with normal thickness and cuspal   separation. DOPPLER: Transaortic velocity was within the normal range with   no evidence of aortic stenosis. There was no evidence of aortic   regurgitation. Tricuspid Valve   The tricuspid valve structure was normal with normal leaflet separation. DOPPLER: There was no evidence of tricuspid stenosis. There was trace   tricuspid regurgitation. Pulmonic Valve   The pulmonic valve leaflets exhibited normal thickness, no calcification,   and normal cuspal separation. DOPPLER: The transpulmonic velocity was   within the normal range with no evidence for regurgitation. Left Atrium   Left atrial size was normal.      Left Ventricle   Normal left ventricular size and systolic function. There were no regional wall motion abnormalities. Wall thickness was within normal limits. Ejection fraction was estimated at 60-65%.       Right Atrium   Right atrial size was normal. Right Ventricle   The right ventricular size was normal with normal systolic function and   wall thickness. Pericardial Effusion   The pericardium was normal in appearance with a small, non-hemodynamically   significant pericardial effusion. Prominent pericardial fat pad. Pleural Effusion   No evidence of pleural effusion. Aorta / Great Vessels   IVC size is dilated with reduced respiratory phasic changes (CVP~10-15   mmHg). Radiology    CXR  PORT CXR 11/29/21  Complete opacification left hemithorax with volume loss. Resolution right pneumothorax and improved right consolidation. PORT CXR 11/19/21:  There is a Dobbhoff tube which projects over the stomach. There is a left-sided percutaneous nephrostomy tube, stable compared to prior CT. 1. Mild hepatomegaly. Left subclavian dialysis catheter with tip at cavoatrial junction. NG tube passes into stomach. Tracheostomy tube in good position. Right jugular line with catheter tip in right atrium. 2. Tiny bilateral pleural effusions. Moderate pneumonia/pulmonary edema scattered relatively diffusely in both lungs. 3. Overall appearance of chest has worsened somewhat since prior.       CT Scans  (See actual reports for details)    CT chest without contrast on 11/1/21: There are moderate bilateral pleural effusions which have mildly increased in size in the interval. There is adjacent atelectasis. Pulmonary vessels are prominent, similar to prior exam. There are groundglass opacities adjacent to the pulmonary vessels. The heart is enlarged, similar to prior exam. There is a small pericardial effusion, decreased compared to prior exam. Redemonstration of percutaneous cardiac pacer leads and enteric tube. The endotracheal tube is stable with tip in the distal trachea. There is marked thyromegaly which is nodular in appearance, stable compared to prior exam. Visualized upper abdominal solid organs are grossly unremarkable.  There are stable degenerative changes of the thoracic spine with exaggerated thoracic kyphosis. Assessment   -Left hemithorax opacification with volume loss   -S/p bronchoscopy on 11/28/21 with mucous plug removal from left lung   -Hemoptysis in the setting of tracheostomy tube--resolved  -Acute hypoxic respiratory failure secondary to severe multi-organism pneumonia with bilateral pleural effusion and repeated mucous plugging   Intubated 10/27/21, extubated 11/9/21, reintubated 11/15/21, tracheostomy tube placed 11/17/21  -Right-sided pneumothorax. Status post chest tube 11/26/2021  -S/p bronchoscopy with mucous plug removal from the left mainstem on 11/13/21 by Dr. 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 Heparin stopped (dialysis dose last ran on 11/11/21). -Thrombocytopenia   -Hematuria   -BARBER noncompliant with CPAP     Plan   -CT chest without contrast to further evaluate opacification of left panfilo-thorax  -Follow pending cultures   -SBT trials   -Metaneb   -Keep chest tube to suction. -Aspiration precautions   -Cautious use of tracheal suction to reduce risk of suction trauma   -Wean FiO2 as tolerated to maintain saturation above 90%. -Patient to be transferred to [place]   Questions and concerns addressed.     Electronically signed by   Gretchen Mauricio DO on 11/29/2021 at 8:07 PM

## 2021-11-30 NOTE — PROGRESS NOTES
[facility]  INPATIENT PHYSICAL THERAPY  DAILY NOTE  STR[facility] ICU STEPDOWN TELEMETRY 4K - 4K-12/012-A      Time In:   Time Out: 1305  Timed Code Treatment Minutes: 24 Minutes  Minutes: 24          Date: 2021  Patient Name: Mohini Abad,  Gender:  female        MRN: [id number]  : 1950  (73 y.o.)     Referring Practitioner: Albaro Buckner DO  Diagnosis: acute respiratory failure  Additional Pertinent Hx: admit with above diagnosis,71F admitted to [facility] 10/27/21 w/ SOB. Current smoker w/ PMH PAH grp 3, HFpEF, stage III sacral ulcer s/p wound ostomy . Patient sister reports SpO2 51% at home and was brought to ED where ABG showed pH 7.19, PCO2 80, PO2 134. She required emergent intubation and was transferred to ICU. Workup revealed left sided pleural effusion and underwent US guided thoracentesis w/ 1100 cc removed consistent w/ MRSA/adenovirus. Patient was noted to be in afib/RVR and was placed on amio, heparin drip. Patient was also noted to have normocytic anemia and received 1 unit PRBC 10/31/21. CT abdomen revealed CBD dilation w/ cholelithiasis. s/p trach placement 21     Prior Level of Function:  Lives With: Family (brother)  Type of Home: House  Home Layout: One level  Home Equipment: Rolling walker, [employer]   Bathroom Equipment: Shower chair    ADL Assistance: Independent  Homemaking Assistance: Needs assistance (family completes all)  Ambulation Assistance: Independent  Transfer Assistance: Independent  Additional Comments: Brother reported pt has been using RW since  when had surgery/wound vac for sacral decubitus. Brother reported prior to surgery pt was walking 3,000 steps/day, although since surgery does minimal activity.     Restrictions/Precautions:  Restrictions/Precautions: General Precautions, Fall Risk  Position Activity Restriction  Other position/activity restrictions: trach to vent,rectal tube, NG, PEG tube, nephrostomy tube       SUBJECTIVE: nursing ok'd therapy- pt in bed on arrival agreeable for therapy     PAIN: she did grimace at times with mobility     Vitals: Oxygen: pt on vent 30%  FiO2 w/ a PEEP of 6    OBJECTIVE:  Bed Mobility:  Rolling to right and Left: Maximum Assistance, attempted to get her to reach for rail however she wasn't able to grasp the rail or complete a full roll  - unable to complete full bed mobility sitting edge of bed w/o assists of a second person this date         Exercise:  Patient was guided in 1 set(s) 10 reps of exercise to both lower extremities. Ankle pumps followed with heelcord stretch noted increased tightness at right vs left, heelslides with max assist and limited knee flexion, hip abd/add with min to mod assist, quad sets with tactile cues, short arc quads with CGA to min assist, UE hand squeeze, shoulder punches to ~ 70 degrees, elbow flex/ext, shoulder shrugs, shoulder horz abd/add with assist.  Exercises were completed for increased independence with functional mobility. Functional Outcome Measures: Completed  AM-PAC Inpatient Mobility without Stair Climbing Raw Score : 5  AM-PAC Inpatient without Stair Climbing T-Scale Score : 23.59    ASSESSMENT:  Assessment: Pt cont to demonstrate weakness in gerardo LEs and decreased endurance- pt would benefit from cont skilled therapy   Activity Tolerance:  Patient tolerance of  treatment: fair. Equipment Recommendations: Other: cont to assess needs  Discharge Recommendations: Subacte/Skilled Nursing Facility and LTACH  Plan: Times per week: 3-5X GM  Times per day: Daily  Specific instructions for Next Treatment: therex, bed mobility, sitting balance, PT to assess transfers    Patient Education  Patient Education: Plan of Care    Goals:  Patient goals : not stated  Short term goals  Time Frame for Short term goals: by discharge  Short term goal 1: Roll L/R with modA for pressure relief  Short term goal 2: sidelying to/from sit with modAx2 to get in/out of bed  Short term goal 3: tolerate >15 min sitting balance activity with </=CGA for balance to demonstrate inc strength/endurance for progression to transfers  Short term goal 4: PT to assess transfers/gait  Long term goals  Time Frame for Long term goals : no LTGs set secondary to short ELOS    Following session, patient left in safe position with all fall risk precautions in place.

## 2021-11-30 NOTE — PROGRESS NOTES
1555 Pt arrives to recovery responsive to verbal stimulation. Pt denies any pain at this time. Pt arrives with respoiratory on trach vent. Pt respirations are evne and unlabored. Pt VSS  1610 Pt remains to deny any pain at this time. Pt respirations are even and unlabored. Pt VSS. Pt resting with eyes closed   1625 Pt meets criteria for discharge from recovery. Pt status remains unchanged. 1630 Report called to 4k RN at this time   1640 Pt respirations are even and unlabored. Pt denies any pain. Pt VSS. Pt resting with eyes closed. 1555 Pt status remains unchanged.    1610 Pt status remains unchanged   1719 transported to [facility]

## 2021-11-30 NOTE — PROGRESS NOTES
[facility]         Renal Inpatient Follow-Up note         11/30/2021 9:52 AM    Pt Name:   Tamera Louise  YOB: 1950  Attending:   Angela Patino DO    Chief Complaint : Tamera Louise is a 73 y.o. female being followed by nephrology for ALVIN/CKD    Interval History :   Patient seen and examined by me.  No distress  Patient has a trach now not much communicative cannot assess history or review of systems  Has achest tube in now     Scheduled Medications :    Sodium Hypochlorite   Irrigation Daily    midazolam  4 mg IntraVENous Once    ketamine  50 mg IntraVENous Once    lidocaine PF  20 mL IntraDERmal Once    metoprolol tartrate  12.5 mg Oral BID    sodium chloride flush  5-40 mL IntraVENous 2 times per day    metoclopramide  5 mg IntraVENous Q8H    sodium chloride (Inhalant)  4 mL Nebulization Q6H    And    albuterol  2.5 mg Nebulization Q6H    lactobacillus  1 capsule Per NG tube Daily with breakfast    chlorhexidine  15 mL Mouth/Throat BID    insulin lispro  0-6 Units SubCUTAneous Q6H    allopurinol  100 mg Oral Daily    phosphorus replacement protocol   Other RX Placeholder    potassium bicarb-citric acid  40 mEq Oral Once    tiotropium-olodaterol  2 puff Inhalation Daily    [Held by provider] aspirin  81 mg Oral Daily    pantoprazole  40 mg IntraVENous QAM    [Held by provider] Riociguat  2.5 mg Oral Q8H    FLUoxetine  40 mg Oral Daily      sodium chloride 25 mL (11/28/21 2230)    dextrose Stopped (11/25/21 0336)    [Held by provider] sodium chloride 50 mL/hr at 10/28/21 2314       Vitals :  BP (!) 108/54   Pulse 85   Temp 99.3 °F (37.4 °C) (Axillary)   Resp 18   Ht 4' 9\" (1.448 m)   Wt 167 lb 8.8 oz (76 kg)   SpO2 98%   BMI 36.26 kg/m²     24HR INTAKE/OUTPUT:      Intake/Output Summary (Last 24 hours) at 11/30/2021 0952  Last data filed at 11/30/2021 0910  Gross per 24 hour   Intake 1931 ml   Output 2122 ml   Net -191 ml     Last 3 weights  Wt Readings from Last 3 Encounters:   11/30/21 167 lb 8.8 oz (76 kg)   10/25/21 164 lb (74.4 kg)   09/29/21 160 lb (72.6 kg)           Physical Exam :  General Appearance:  Well developed. No distress  Mouth/Throat:  Oral mucosa moist.  Trach in place  Neck:  Supple, no JVD  Lungs:  Breath sounds: clear, diminished, chest tube in palce  Heart[de-identified]  S1,S2 heard  Abdomen:  Soft, non - tender  Musculoskeletal:  Edema - noted          Last 3 CBC   Recent Labs     11/28/21  0320 11/29/21  0500 11/30/21  0505   WBC 7.3 6.0 7.8   RBC 2.66* 2.53* 2.67*   HGB 7.9* 7.4* 7.7*   HCT 25.5* 24.4* 25.4*    239 199     Last 3 CMP  Recent Labs     11/28/21  0320 11/29/21  0500 11/30/21  0505   * 132* 133*   K 3.7 3.6 3.4*   CL 96* 96* 98   CO2 26 25 24   BUN 42* 51* 35*   CREATININE 2.9* 3.5* 2.7*   CALCIUM 8.4* 8.2* 8.5             ASSESSMENT / Plan   1. Renal -acute kidney injury, multifactorial etiology which includes hypotension from sepsis, IV contrast exposure on 10/29 and has some hydronephrosis as well . ? Patient's creatinine continued to get worse, but primarily fluid status worsened,so initaiily she was started on CVVH and now transition to HD  ? S/P HD yesterday, cath not functioning well.  ? BC are negative and now off abx as well. Schedule a tunneled HD cath placement prior to next treatment     2. Electrolytes - mild hyponatremia adjust with dialysis,   3. Hypokalemia we will start 20 of KCl.  4. Acute hypercapnic respiratory failure currently ventilator dependent now with a trach , Pulm on board for hemoptysis and also has a chest tube  5. HypotensionContinue midodrine  6. Hydronephrosis status post nephrostomy tube placement   7. + ANCA, patient has been having some proteinuria as an outpatient and during my last visit have ordered all the serologic work-up , which the patient has done prior to getting admitted to the hospital however the ANCA was not done at that time.   She may be having some renal pathology going on , but I doubt it has anything to do with acute renal dysfunction during this admission. However she has received steroids. Once clinically stable might consider a renal biopsy. 8. Meds reviewed. Discussed with the patient     Dr. Doug Maravilla MD, M,D.  [facility].

## 2021-11-30 NOTE — OP NOTE
Therapeutic Bronchoscopy procedure note under general anesthesia for mucus plugging    Patient: Clarisse Varner  : 1950      Operation: Flexible therapeutic fiberoptic bronchoscopy without fluoroscopy. During procedure following procedures were performed: Therapeutic bronchoscope was performed from left tracheobronchial tree. Mucus plugs were removed by doing therapeutic suctioning from left tracheobronchial tree. Bronch washings obtained from Left tracheobronchial tree including left upper, lingular and lower lobes      Date of Operation: 2021    Indication for procedure: Abnormal chest with Xray with total opacification of left hemithorax with hx of mucus plugging. ? Mucus plug for removal.      Pre operative diagnoses:  -Left hemithorax opacification with volume loss. -Hemoptysis in the setting of tracheostomy tube  -Acute hypoxic respiratory failure secondary to severe multi-organism pneumonia with bilateral pleural effusion and repeated mucous plugging   Intubated 10/27/21, extubated 21, reintubated 11/15/21, tracheostomy tube placed 21  -Right-sided pneumothorax. Status post chest tube 2021  -S/p bronchoscopy with mucous plug removal from the left mainstem on 21 by Dr. Sree Douglas in ICU  -Bilateral pleural effusions   -Severe bilateral multiorganism pneumonia. 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 -Hemoptysis in the setting of tracheostomy tube  -Acute hypoxic respiratory failure secondary to severe multi-organism pneumonia with bilateral pleural effusion and repeated mucous plugging   Intubated 10/27/21, extubated 21, reintubated 11/15/21, tracheostomy tube placed 21  -Right-sided pneumothorax. Status post chest tube 11/26/2021  -S/p bronchoscopy with mucous plug removal from the left mainstem on 11/13/21 by Dr. Eliseo Layton in ICU  -Bilateral pleural effusions   -Severe bilateral multiorganism pneumonia. 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 female . The risks, benefits, complications, treatment options and expected outcomes were discussed with the patient. Consent obtained from the patient after explaining the risks and complications of the procedure. The possibilities of reaction to medication, pulmonary aspiration, perforation of a viscus, development of pneumothorax with requirement of chest tube placement,air way bleeding, failure to diagnose a condition and creating a complication leading to respiratory failure requiring mechanical ventilation, transfusion or operation were discussed with the patient who freely signed the consent. The patient's next of kin was counseled at length about the risks of amanda Covid-19 during their perioperative period and any recovery window from their procedure. The patient was made aware that amanda Covid-19  may worsen their prognosis for recovering from their procedure  and lend to a higher morbidity and/or mortality risk. All material risks, benefits, and reasonable alternatives including postponing the procedure were discussed. The patient's next of kin wish to proceed with the procedure at this time. Anesthesia: Patient was given general endotracheal anesthesia by CRNA Mr. John Garay from anesthesiology dept. Please see anesthesiologist's note for details. Description of Procedure:     The patient was taken to endoscopy suite, identified as Sandra Wilson and the procedure verified as Flexible Fiberoptic Bronchoscopy. A Time Out was held and the above information confirmed. After the induction of general  anesthesia by the anesthesiologist, the patient was placed in appropriate position and the Flexible Fibrooptic bronchoscope was advanced through the tracheostomy tube after placing the Swivel adopter. The lower end of tracheostomy tube was found to be in appropriate position. The scope was advanced into the trachea. The avelina is sharp with no growths. Careful inspection of the tracheal lumen below the lower end of endotracheal tube was accomplished and found to have no growths. The scope was  advanced into the right main bronchus and then into the Right upper lobe, Right middle lobe, and Right lower lobe bronchi and segmental bronchi. She was noted to have minimal blood stained secretions in the right tracheobronchial tree- removed by suctioning     She was noted to have blood stained, thick mucus plug present at the origin of Left main stem bronchus. The mucus plug was removed by therapeutic suction. The scope was sequentially passed into the Left main and then Left upper and lower bronchi and segmental bronchi. Several blood stained mucus plugs were removed from left tracheobronchial tree. Therapeutic bronchoscope was performed from left tracheobronchial tree. Mucus plugs were removed by doing therapeutic suctioning from left tracheobronchial tree. Bronchial washings: Bronchial washings were performed form Left tracheobronchial tree including left upper, lingular and lower lobes. More than 25 ml of blood stained Bronchial washings were obtained and sent to the laboratory for further analysis. Endobronchial findings:   Trachea: Normal mucosa.   Avelina: Normal mucosa  Right main bronchus: Normal mucosa  Right upper lobe bronchus: Normal mucosa  Right Middle lobe bronchus: Normal mucosa  Right Lower lobe bronchus:Normal mucosa  Left main bronchus: Normal mucosa  Left upper lobe bronchus: Normal mucosa  Left lower lobe bronchus: Inflamed and erythematous mucosa    The Patient was taken to the endoscopy recovery area in satisfactory condition. Estimated Blood Loss: Less than 5ml. Complications: None. Recommendation/Plan:  1. F/U on culturs results    Follow up: She will be followed as in patient. Attestation: I performed the procedure.     Vanessa Goel MD    Electronically signed by Vanessa Goel MD on 11/30/2021 at 3:54 PM

## 2021-12-01 ENCOUNTER — HOSPITAL ENCOUNTER (OUTPATIENT)
Age: 71
Discharge: ANOTHER HOSPITAL | End: 2022-01-20
Attending: INTERNAL MEDICINE | Admitting: INTERNAL MEDICINE
Payer: COMMERCIAL

## 2021-12-01 ENCOUNTER — APPOINTMENT (OUTPATIENT)
Dept: INTERVENTIONAL RADIOLOGY/VASCULAR | Age: 71
DRG: 004 | End: 2021-12-01
Payer: MEDICARE

## 2021-12-01 ENCOUNTER — APPOINTMENT (OUTPATIENT)
Dept: GENERAL RADIOLOGY | Age: 71
DRG: 004 | End: 2021-12-01
Payer: MEDICARE

## 2021-12-01 VITALS
BODY MASS INDEX: 36.37 KG/M2 | DIASTOLIC BLOOD PRESSURE: 48 MMHG | OXYGEN SATURATION: 97 % | WEIGHT: 168.56 LBS | RESPIRATION RATE: 19 BRPM | TEMPERATURE: 98.6 F | SYSTOLIC BLOOD PRESSURE: 96 MMHG | HEART RATE: 85 BPM | HEIGHT: 57 IN

## 2021-12-01 DIAGNOSIS — N19 RENAL FAILURE, UNSPECIFIED CHRONICITY: ICD-10-CM

## 2021-12-01 DIAGNOSIS — J96.90 RESPIRATORY FAILURE, UNSPECIFIED CHRONICITY, UNSPECIFIED WHETHER WITH HYPOXIA OR HYPERCAPNIA (HCC): ICD-10-CM

## 2021-12-01 LAB
ALBUMIN FLUID: 1.6 GM/DL
ALBUMIN SERPL-MCNC: 2.5 G/DL (ref 3.5–5.1)
ANION GAP SERPL CALCULATED.3IONS-SCNC: 11 MEQ/L (ref 8–16)
BASOPHILS # BLD: 0.5 %
BASOPHILS ABSOLUTE: 0 THOU/MM3 (ref 0–0.1)
BODY FLUID RBC: 2000 /CUMM
BUN BLDV-MCNC: 43 MG/DL (ref 7–22)
CALCIUM SERPL-MCNC: 8.4 MG/DL (ref 8.5–10.5)
CHARACTER, BODY FLUID: NORMAL
CHLORIDE BLD-SCNC: 95 MEQ/L (ref 98–111)
CO2: 25 MEQ/L (ref 23–33)
COLOR: YELLOW
CREAT SERPL-MCNC: 3.3 MG/DL (ref 0.4–1.2)
EOSINOPHIL # BLD: 0 %
EOSINOPHILS ABSOLUTE: 0 THOU/MM3 (ref 0–0.4)
ERYTHROCYTE [DISTWIDTH] IN BLOOD BY AUTOMATED COUNT: 16.8 % (ref 11.5–14.5)
ERYTHROCYTE [DISTWIDTH] IN BLOOD BY AUTOMATED COUNT: 58.2 FL (ref 35–45)
GFR SERPL CREATININE-BSD FRML MDRD: 14 ML/MIN/1.73M2
GLUCOSE BLD-MCNC: 116 MG/DL (ref 70–108)
GLUCOSE BLD-MCNC: 120 MG/DL (ref 70–108)
GLUCOSE BLD-MCNC: 94 MG/DL (ref 70–108)
GLUCOSE BLD-MCNC: 97 MG/DL (ref 70–108)
GLUCOSE, FLUID: 96 MG/DL
HCT VFR BLD CALC: 24.7 % (ref 37–47)
HEMOGLOBIN: 7.6 GM/DL (ref 12–16)
IMMATURE GRANS (ABS): 0.18 THOU/MM3 (ref 0–0.07)
IMMATURE GRANULOCYTES: 2.1 %
LD, FLUID: 93 U/L
LD: 140 U/L (ref 100–190)
LYMPHOCYTES # BLD: 7.6 %
LYMPHOCYTES ABSOLUTE: 0.7 THOU/MM3 (ref 1–4.8)
MAGNESIUM: 1.7 MG/DL (ref 1.6–2.4)
MCH RBC QN AUTO: 29.3 PG (ref 26–33)
MCHC RBC AUTO-ENTMCNC: 30.8 GM/DL (ref 32.2–35.5)
MCV RBC AUTO: 95.4 FL (ref 81–99)
MESOTHELIAL CELLS BODY FLUID: NORMAL
MONOCYTES # BLD: 6.4 %
MONOCYTES ABSOLUTE: 0.6 THOU/MM3 (ref 0.4–1.3)
MONONUCLEAR CELLS BODY FLUID: 93.2 %
NUCLEATED RED BLOOD CELLS: 0 /100 WBC
PATHOLOGIST REVIEW: NORMAL
PH FLUID: 7.49
PLATELET # BLD: 229 THOU/MM3 (ref 130–400)
PMV BLD AUTO: 8.8 FL (ref 9.4–12.4)
POLYMORPHONUCLEAR CELLS BODY FLUID: 6.8 %
POTASSIUM REFLEX MAGNESIUM: 4 MEQ/L (ref 3.5–5.2)
POTASSIUM SERPL-SCNC: 4 MEQ/L (ref 3.5–5.2)
PROTEIN FLUID: 3 GM/DL
RBC # BLD: 2.59 MILL/MM3 (ref 4.2–5.4)
SEG NEUTROPHILS: 83.4 %
SEGMENTED NEUTROPHILS ABSOLUTE COUNT: 7.2 THOU/MM3 (ref 1.8–7.7)
SODIUM BLD-SCNC: 131 MEQ/L (ref 135–145)
SPECIMEN: NORMAL
TOTAL NUCLEATED CELLS BODY FLUID: 116 /CUMM (ref 0–500)
TOTAL PROTEIN: 5.1 G/DL (ref 6.1–8)
TOTAL VOLUME RECEIVED BODY FLUID: 16 ML
WBC # BLD: 8.6 THOU/MM3 (ref 4.8–10.8)

## 2021-12-01 PROCEDURE — 6370000000 HC RX 637 (ALT 250 FOR IP): Performed by: INTERNAL MEDICINE

## 2021-12-01 PROCEDURE — 99239 HOSP IP/OBS DSCHRG MGMT >30: CPT | Performed by: HOSPITALIST

## 2021-12-01 PROCEDURE — 36558 INSERT TUNNELED CV CATH: CPT | Performed by: RADIOLOGY

## 2021-12-01 PROCEDURE — 84157 ASSAY OF PROTEIN OTHER: CPT

## 2021-12-01 PROCEDURE — 87075 CULTR BACTERIA EXCEPT BLOOD: CPT

## 2021-12-01 PROCEDURE — 6360000002 HC RX W HCPCS: Performed by: RADIOLOGY

## 2021-12-01 PROCEDURE — 83986 ASSAY PH BODY FLUID NOS: CPT

## 2021-12-01 PROCEDURE — 2580000003 HC RX 258: Performed by: RADIOLOGY

## 2021-12-01 PROCEDURE — 71045 X-RAY EXAM CHEST 1 VIEW: CPT

## 2021-12-01 PROCEDURE — 94003 VENT MGMT INPAT SUBQ DAY: CPT

## 2021-12-01 PROCEDURE — 6370000000 HC RX 637 (ALT 250 FOR IP): Performed by: STUDENT IN AN ORGANIZED HEALTH CARE EDUCATION/TRAINING PROGRAM

## 2021-12-01 PROCEDURE — 85025 COMPLETE CBC W/AUTO DIFF WBC: CPT

## 2021-12-01 PROCEDURE — 80048 BASIC METABOLIC PNL TOTAL CA: CPT

## 2021-12-01 PROCEDURE — 77001 FLUOROGUIDE FOR VEIN DEVICE: CPT | Performed by: RADIOLOGY

## 2021-12-01 PROCEDURE — 6360000002 HC RX W HCPCS: Performed by: INTERNAL MEDICINE

## 2021-12-01 PROCEDURE — 36592 COLLECT BLOOD FROM PICC: CPT

## 2021-12-01 PROCEDURE — 99232 SBSQ HOSP IP/OBS MODERATE 35: CPT | Performed by: INTERNAL MEDICINE

## 2021-12-01 PROCEDURE — C1881 DIALYSIS ACCESS SYSTEM: HCPCS

## 2021-12-01 PROCEDURE — 6360000002 HC RX W HCPCS: Performed by: PHARMACIST

## 2021-12-01 PROCEDURE — 82945 GLUCOSE OTHER FLUID: CPT

## 2021-12-01 PROCEDURE — 83615 LACTATE (LD) (LDH) ENZYME: CPT

## 2021-12-01 PROCEDURE — 83735 ASSAY OF MAGNESIUM: CPT

## 2021-12-01 PROCEDURE — 88112 CYTOPATH CELL ENHANCE TECH: CPT

## 2021-12-01 PROCEDURE — 87070 CULTURE OTHR SPECIMN AEROBIC: CPT

## 2021-12-01 PROCEDURE — C1729 CATH, DRAINAGE: HCPCS

## 2021-12-01 PROCEDURE — C9113 INJ PANTOPRAZOLE SODIUM, VIA: HCPCS | Performed by: NURSE PRACTITIONER

## 2021-12-01 PROCEDURE — 6360000002 HC RX W HCPCS: Performed by: STUDENT IN AN ORGANIZED HEALTH CARE EDUCATION/TRAINING PROGRAM

## 2021-12-01 PROCEDURE — 6360000002 HC RX W HCPCS: Performed by: NURSE PRACTITIONER

## 2021-12-01 PROCEDURE — 94640 AIRWAY INHALATION TREATMENT: CPT

## 2021-12-01 PROCEDURE — 82042 OTHER SOURCE ALBUMIN QUAN EA: CPT

## 2021-12-01 PROCEDURE — 32555 ASPIRATE PLEURA W/ IMAGING: CPT | Performed by: RADIOLOGY

## 2021-12-01 PROCEDURE — 2500000003 HC RX 250 WO HCPCS: Performed by: RADIOLOGY

## 2021-12-01 PROCEDURE — 0W9B3ZZ DRAINAGE OF LEFT PLEURAL CAVITY, PERCUTANEOUS APPROACH: ICD-10-PCS | Performed by: RADIOLOGY

## 2021-12-01 PROCEDURE — 6370000000 HC RX 637 (ALT 250 FOR IP): Performed by: FAMILY MEDICINE

## 2021-12-01 PROCEDURE — 82948 REAGENT STRIP/BLOOD GLUCOSE: CPT

## 2021-12-01 PROCEDURE — 99233 SBSQ HOSP IP/OBS HIGH 50: CPT | Performed by: INTERNAL MEDICINE

## 2021-12-01 PROCEDURE — 88305 TISSUE EXAM BY PATHOLOGIST: CPT

## 2021-12-01 PROCEDURE — 89050 BODY FLUID CELL COUNT: CPT

## 2021-12-01 RX ORDER — FENTANYL CITRATE 50 UG/ML
50 INJECTION, SOLUTION INTRAMUSCULAR; INTRAVENOUS ONCE
Status: COMPLETED | OUTPATIENT
Start: 2021-12-01 | End: 2021-12-01

## 2021-12-01 RX ADMIN — ALLOPURINOL 100 MG: 100 TABLET ORAL at 09:49

## 2021-12-01 RX ADMIN — ALBUTEROL SULFATE 2.5 MG: 2.5 SOLUTION RESPIRATORY (INHALATION) at 00:27

## 2021-12-01 RX ADMIN — ALBUTEROL SULFATE 2.5 MG: 2.5 SOLUTION RESPIRATORY (INHALATION) at 12:09

## 2021-12-01 RX ADMIN — METOCLOPRAMIDE HYDROCHLORIDE 5 MG: 5 INJECTION INTRAMUSCULAR; INTRAVENOUS at 09:51

## 2021-12-01 RX ADMIN — EPOETIN ALFA-EPBX 2000 UNITS: 2000 INJECTION, SOLUTION INTRAVENOUS; SUBCUTANEOUS at 10:01

## 2021-12-01 RX ADMIN — Medication 1 CAPSULE: at 09:49

## 2021-12-01 RX ADMIN — METOPROLOL TARTRATE 12.5 MG: 25 TABLET, FILM COATED ORAL at 09:50

## 2021-12-01 RX ADMIN — METOCLOPRAMIDE HYDROCHLORIDE 5 MG: 5 INJECTION INTRAMUSCULAR; INTRAVENOUS at 01:13

## 2021-12-01 RX ADMIN — MIDAZOLAM 1 MG: 1 INJECTION INTRAMUSCULAR; INTRAVENOUS at 08:25

## 2021-12-01 RX ADMIN — CLINDAMYCIN PHOSPHATE 600 MG: 600 INJECTION, SOLUTION INTRAVENOUS at 10:04

## 2021-12-01 RX ADMIN — ALBUTEROL SULFATE 2.5 MG: 2.5 SOLUTION RESPIRATORY (INHALATION) at 05:38

## 2021-12-01 RX ADMIN — SODIUM CHLORIDE: 4.5 INJECTION, SOLUTION INTRAVENOUS at 07:00

## 2021-12-01 RX ADMIN — EPOETIN ALFA-EPBX 3000 UNITS: 3000 INJECTION, SOLUTION INTRAVENOUS; SUBCUTANEOUS at 10:00

## 2021-12-01 RX ADMIN — TIOTROPIUM BROMIDE AND OLODATEROL 2 PUFF: 3.124; 2.736 SPRAY, METERED RESPIRATORY (INHALATION) at 05:39

## 2021-12-01 RX ADMIN — ACETYLCYSTEINE 600 MG: 200 SOLUTION ORAL; RESPIRATORY (INHALATION) at 05:38

## 2021-12-01 RX ADMIN — FLUOXETINE 40 MG: 20 CAPSULE ORAL at 09:49

## 2021-12-01 RX ADMIN — PANTOPRAZOLE SODIUM 40 MG: 40 INJECTION, POWDER, FOR SOLUTION INTRAVENOUS at 09:50

## 2021-12-01 RX ADMIN — FENTANYL CITRATE 50 MCG: 0.05 INJECTION, SOLUTION INTRAMUSCULAR; INTRAVENOUS at 08:25

## 2021-12-01 ASSESSMENT — PAIN SCALES - WONG BAKER
WONGBAKER_NUMERICALRESPONSE: 0

## 2021-12-01 ASSESSMENT — PULMONARY FUNCTION TESTS
PIF_VALUE: 22
PIF_VALUE: 20

## 2021-12-01 ASSESSMENT — PAIN SCALES - GENERAL: PAINLEVEL_OUTOF10: 0

## 2021-12-01 NOTE — H&P
Formulation and discussion of sedation / procedure plans, risks, benefits, side effects and alternatives with patient and/or responsible adult completed.     Electronically signed by Leana Farias MD on 12/1/2021 at 8:26 AM

## 2021-12-01 NOTE — PROGRESS NOTES
[facility]         Renal Inpatient Follow-Up note         12/1/2021 4:17 PM    Pt Name:   Jenna Davidson:   1950  Attending:   Salome Snow MD    Chief Complaint : Magalis Gutierrez is a 73 y.o. female being followed by nephrology for ALVIN/CKD    Interval History :   Patient seen and examined by me.  No distress  Patient has a trach now not much communicative cannot assess history or review of systems  Patient had tunneled dialysis cath placement this morning     Scheduled Medications :    epoetin prince-epbx  2,000 Units SubCUTAneous Once per day on Mon Wed Fri    And    epoetin prince-epbx  3,000 Units SubCUTAneous Once per day on Mon Wed Fri    HYDROmorphone  1 mg IntraVENous Once    acetylcysteine  600 mg Inhalation BID    Sodium Hypochlorite   Irrigation Daily    midazolam  4 mg IntraVENous Once    ketamine  50 mg IntraVENous Once    lidocaine PF  20 mL IntraDERmal Once    metoprolol tartrate  12.5 mg Oral BID    sodium chloride flush  5-40 mL IntraVENous 2 times per day    metoclopramide  5 mg IntraVENous Q8H    sodium chloride (Inhalant)  4 mL Nebulization Q6H    And    albuterol  2.5 mg Nebulization Q6H    lactobacillus  1 capsule Per NG tube Daily with breakfast    chlorhexidine  15 mL Mouth/Throat BID    insulin lispro  0-6 Units SubCUTAneous Q6H    allopurinol  100 mg Oral Daily    phosphorus replacement protocol   Other RX Placeholder    potassium bicarb-citric acid  40 mEq Oral Once    tiotropium-olodaterol  2 puff Inhalation Daily    [Held by provider] aspirin  81 mg Oral Daily    pantoprazole  40 mg IntraVENous QAM    [Held by provider] Riociguat  2.5 mg Oral Q8H    FLUoxetine  40 mg Oral Daily      sodium chloride 20 mL/hr at 12/01/21 0700    sodium chloride Stopped (11/30/21 1550)    dextrose Stopped (11/25/21 0336)    [Held by provider] sodium chloride 50 mL/hr at 10/28/21 2314       Vitals :  BP (!) 96/48   Pulse 85   Temp 98.6 °F (37 °C) (Axillary)   Resp 19   Ht 4' 9\" (1.448 m)   Wt 168 lb 9 oz (76.5 kg)   SpO2 97%   BMI 36.48 kg/m²     24HR INTAKE/OUTPUT:      Intake/Output Summary (Last 24 hours) at 12/1/2021 1617  Last data filed at 12/1/2021 1028  Gross per 24 hour   Intake 308.62 ml   Output 1050 ml   Net -741.38 ml     Last 3 weights  Wt Readings from Last 3 Encounters:   12/01/21 168 lb 9 oz (76.5 kg)   10/25/21 164 lb (74.4 kg)   09/29/21 160 lb (72.6 kg)           Physical Exam :  General Appearance:  Well developed. No distress  Mouth/Throat:  Oral mucosa moist.  Trach in place  Neck:  Supple, no JVD  Lungs:  Breath sounds: clear, diminished, chest tube in palce  Heart[de-identified]  S1,S2 heard  Abdomen:  Soft, non - tender  Musculoskeletal:  Edema - noted, more prominent in the dependent areas         Last 3 CBC   Recent Labs     11/29/21  0500 11/30/21  0505 12/01/21  0435   WBC 6.0 7.8 8.6   RBC 2.53* 2.67* 2.59*   HGB 7.4* 7.7* 7.6*   HCT 24.4* 25.4* 24.7*    199 229     Last 3 CMP  Recent Labs     11/29/21  0500 11/29/21  0500 11/30/21  0431 11/30/21  0505 12/01/21  0435   *  --   --  133* 131*   K 3.6   < >  --  3.4* 4.0  4.0   CL 96*  --   --  98 95*   CO2 25  --   --  24 25   BUN 51*  --   --  35* 43*   CREATININE 3.5*  --   --  2.7* 3.3*   CALCIUM 8.2*  --   --  8.5 8.4*   LABALBU  --   --  2.5*  --   --     < > = values in this interval not displayed. ASSESSMENT / Plan   1. Renal -acute kidney injury, multifactorial etiology which includes hypotension from sepsis, IV contrast exposure on 10/29 and has some hydronephrosis as well . ? Patient's creatinine continued to get worse, but primarily fluid status worsened,so initaiily she was started on CVVH and now transition to HD  ? Intradialytic creatinine worsening. No acute need for dialysis today  ? Tunneled cath has been placed in this morning     2. Electrolytes - mild hyponatremia adjust with dialysis,   3. Hypokalemia improved  4.  Acute hypercapnic respiratory failure currently ventilator dependent now with a trach , Pulm on board for hemoptysis and also has a chest tube  5. Hypotension May need to restart midodrine  6. Hydronephrosis status post nephrostomy tube placement   7. + ANCA, patient has been having some proteinuria as an outpatient and during my last visit have ordered all the serologic work-up , which the patient has done prior to getting admitted to the hospital however the ANCA was not done at that time. She may be having some renal pathology going on , but I doubt it has anything to do with acute renal dysfunction during this admission. However she has received steroids. Once clinically stable might consider a renal biopsy. But I doubt this will be of any clinical significance at this point  8. Meds reviewed. Discussed with the patient     Dr. Mo Burr MD, M,D.  [facility].

## 2021-12-01 NOTE — ANESTHESIA POSTPROCEDURE EVALUATION
Department of Anesthesiology  Postprocedure Note    Patient: Quiana Vergara  MRN: [id number]  YOB: 1950  Date of evaluation: 12/1/2021  Time:  6:17 AM     Procedure Summary     Date: 11/30/21 Room / Location: [address] [place] [address] / [address] [place] [address]    Anesthesia Start: 5836 Anesthesia Stop: 7796    Procedure: BRONCHOSCOPY WITHOUT FLURO (N/A ) Diagnosis: (SOB, ACUTE RESPIRATORY FAILURE)    Surgeons: Lizzeth Loo MD Responsible Provider: Alivia Hill DO    Anesthesia Type: general ASA Status: 4          Anesthesia Type: general    Michelle Phase I: Michelle Score: 8    Michelle Phase II:      Last vitals: Reviewed and per EMR flowsheets.        Anesthesia Post Evaluation    Patient location during evaluation: PACU  Patient participation: complete - patient participated  Level of consciousness: awake  Nausea & Vomiting: no nausea  Complications: no  Cardiovascular status: hemodynamically stable  Respiratory status: acceptable and ventilator  Hydration status: stable

## 2021-12-01 NOTE — CARE COORDINATION
12/1/21, 12:50 PM EST  Plans [place] today  Patient goals/plan/ treatment preferences discussed by  and . Patient goals/plan/ treatment preferences reviewed with patient/ family. Patient/ family verbalize understanding of discharge plan and are in agreement with goal/plan/treatment preferences. Understanding was demonstrated using the teach back method. AVS provided by RN at time of discharge, which includes all necessary medical information pertaining to the patients current course of illness, treatment, post-discharge goals of care, and treatment preferences.         IMM Letter  IMM Letter given to Patient/Family/Significant other/Guardian/POA/by[de-identified] Staff  IMM Letter date given[de-identified] 10/27/21  IMM Letter time given[de-identified] 8498

## 2021-12-01 NOTE — PROGRESS NOTES
0804 Pt arrived to special procedure room 2 for tunneled hemodialysis catheter placement and left chest tube placement or possible thoracentesis. 9778 Procedure explained using teach back method. Pt nods yes in understanding. Call placed to sister Leti Adames to obtain consent. 1350 Dr Sachin Murphy into assess patient and explain procedure. This procedure has been fully reviewed with the patient and her sister and written informed consent has been obtained. 9370 Patient assisted to table in supine position. Monitor attached to patient. Comfort ensured. 7852 Pre-procedure images obtained. 6239 Procedure begins. 7412 Dialysis catheter placed per Dr Sachin Murphy. Patient prepped for left thoracentesis. 3903 Procedure complete. 20ml Sample obtained and sent to lab for analysis. 8275 Monitor removed. Patient assisted to bed in semi fowlers position. Comfort ensured. 3064 Post-procedure images obtained via radiology (portable chest xray) in IR room 2.  8546 Patient transported to  in stable condition. Respiratory therapist at bedside.

## 2021-12-01 NOTE — DISCHARGE INSTR - COC
Continuity of Care Form    Patient Name: Clarisse Varner   :  1950  MRN:  [id number]    Admit date:  10/27/2021  Discharge date: 2021    Code Status Order: Full Code   Advance Directives:      Admitting Physician:  Trude Bence, MD  PCP: Lesly Allen MD    Discharging Nurse: [facility] Unit/Room#: 4K-12/012-A  Discharging Unit Phone Number: ***    Emergency Contact:   Extended Emergency Contact Information  Primary Emergency Contact: Mariya Patel of [address] [place] [address] Phone: [phone]  Mobile Phone: [phone]  Relation: Brother/Sister  Secondary Emergency Contact: Brennen Cardenas of [address] [place] [address] Phone: [phone]  Mobile Phone: [phone]  Relation: Brother/Sister    Past Surgical History:  Past Surgical History:   Procedure Laterality Date    APPENDECTOMY  1960    BRONCHOSCOPY N/A 2021    BRONCHOSCOPY performed by Vanessa Goel MD at  [facility]    BRONCHOSCOPY N/A 2021    BRONCHOSCOPY WITHOUT FLURO performed by Vanessa Goel MD at [facility] N/A 2021    EGD PEG TUBE PLACEMENT performed by Trupti Castillo MD at  [facility]    IR [facility]  2021    IR CHEST TUBE INSERTION 2021 [facility]    IR NEPHROSTOMY PERCUTANEOUS LEFT  2021    IR NEPHROSTOMY PERCUTANEOUS LEFT 2021 Irene Leos MD [facility]    PRESSURE ULCER DEBRIDEMENT Right 2021    EXCISION RIGHT BUTTOCK WOUND AND CLOSURE performed by Laly Armando MD at [address]       Immunization History:   Immunization History   Administered Date(s) Administered    COVID-19, Pfizer, PF, 30mcg/0.3mL 2021, 2021    Influenza, MDCK Quadv, IM, PF (Flucelvax 2 yrs and older) 10/15/2020    Influenza, Quadv, IM, (6 mo and older Fluzone, Flulaval, Fluarix and 3 yrs and older Afluria) 10/11/2017    Influenza, Quadv, IM, PF (6 mo and older Fluzone, Flulaval, Fluarix, and 3 yrs and older Afluria) 09/17/2019    Pneumococcal Conjugate 13-valent (Hiyiarb09) 10/11/2017    Pneumococcal Polysaccharide (Sukruvmxn15) 06/18/2019       Active Problems:  Patient Active Problem List   Diagnosis Code    HTN (hypertension) I10    Chronic depression F32. A    CHF (congestive heart failure) ([facility]) I50.9    Thrombocytopenia ([facility]) D69.6    Moderate episode of recurrent major depressive disorder ([facility]) F33.1    Renal failure syndrome N19    Hyperkalemia E87.5    Isolated non-nephrotic proteinuria R80.0    ALVIN (acute kidney injury) ([facility] [address] N17.9    Chronic right-sided heart failure ([facility]) I50.812    Pulmonary HTN ([facility]) I27.20    Hypotension due to hypovolemia I95.89, E86.1    Acute renal failure superimposed on stage 4 chronic kidney disease ([facility]) N17.9, N18.4    Pressure ulcer of right buttock, stage 3 ([facility]) L89.313    Acute respiratory failure ([facility]) J96.00    Flash pulmonary edema ([facility]) J81.0    Shock ([facility]) R57.9    Aspiration pneumonia of left lung ([facility]) J69.0    Pleural effusion J90    Paroxysmal atrial fibrillation ([facility]) I48.0    Hemoptysis R04.2    Chronic respiratory failure with hypoxia ([facility]) J96.11    Pneumothorax, right J93.9    Collapse of left lung J98.11    Abnormal chest x-ray R93.89       Isolation/Infection:   Isolation            No Isolation          Patient Infection Status       Infection Onset Added Last Indicated Last Indicated By Review Planned Expiration Resolved Resolved By    None active    Resolved    COVID-19 (Rule Out) 10/27/21 10/27/21 10/27/21 COVID-19, Rapid (Ordered)   10/27/21 Rule-Out Test Resulted            Nurse Assessment:  Last Vital Signs: BP (!) 96/48   Pulse 85   Temp 98.6 °F (37 °C) (Axillary)   Resp 19   Ht 4' 9\" (1.448 m)   Wt 168 lb 9 oz (76.5 kg)   SpO2 97%   BMI 36.48 kg/m²     Last documented pain score (0-10 scale): Pain Level: 0  Last Weight:   Wt Readings from Last 1 Encounters:   12/01/21 168 lb 9 oz (76.5 kg) 0930   Drainage Amount None 21 0627   Katey-wound Assessment Blanchable erythema 21 5618   Number of days: 1        Elimination:  Continence: Bowel: {YES / CK:53964}  Bladder: {YES / TE:24963}  Urinary Catheter: {Urinary Catheter:280551811}   Colostomy/Ileostomy/Ileal Conduit: {YES / JK:22633}  Rectal Tube-Stool Appearance: Loose, Watery  Rectal Tube-Stool Color: Brown  Rectal Tube-Stool Amount: Small    Date of Last BM: ***    Intake/Output Summary (Last 24 hours) at 2021 1321  Last data filed at 2021 1028  Gross per 24 hour   Intake 408.62 ml   Output 1050 ml   Net -641.38 ml     I/O last 3 completed shifts:   In: 768.6 [I.V.:273.6; NG/GT:495]  Out: 48 [Urine:50]    Safety Concerns:     Aspiration Risk    Impairments/Disabilities:      Speech    Nutrition Therapy:  Current Nutrition Therapy:   - Tube Feedings:  Renal    Routes of Feeding: Gastrostomy Tube  Liquids: No Liquids  Daily Fluid Restriction: no  Last Modified Barium Swallow with Video (Video Swallowing Test): {Done Not Done TIPT:900831125}    Treatments at the Time of Hospital Discharge:   Respiratory Treatments: ***  Oxygen Therapy:   trach vent  Ventilator:    - Ventilator Settings:    Vt Ordered: 0 mL  Rate Set: 16 bmp  FiO2 : 30 %    PEEP/CPAP: 6  Pressure Support: 12 cmH20    Rehab Therapies: {THERAPEUTIC INTERVENTION:8547290511}  Weight Bearing Status/Restrictions: [facility] Weight Bearin}  Other Medical Equipment (for information only, NOT a DME order):  {EQUIPMENT:416162838}  Other Treatments: ***    Patient's personal belongings (please select all that are sent with patient):  Harish    RN SIGNATURE:  Electronically signed by Gregoria Padilla RN on 21 at 5:27 PM EST    CASE MANAGEMENT/SOCIAL WORK SECTION    Inpatient Status Date: ***    Readmission Risk Assessment Score:  Readmission Risk              Risk of Unplanned Readmission:  34           Discharging to Facility/ Agency   Name: Address:  Phone:  Fax:    Dialysis Facility (if applicable)   Name:  Address:  Dialysis Schedule:  Phone:  Fax:    / signature: {Esignature:[id number]}    PHYSICIAN SECTION    Prognosis: Guarded    Condition at Discharge: Stable    Rehab Potential (if transferring to Rehab): Fair    Recommended Labs or Other Treatments After Discharge: BMP CBC    Physician Certification: I certify the above information and transfer of Mariana Snyder  is necessary for the continuing treatment of the diagnosis listed and that she requires LTAC for greater 30 days.      Update Admission H&P: No change in H&P    PHYSICIAN SIGNATURE:  Electronically signed by Joseph Dennis DO on 12/1/21 at 1:22 PM EST

## 2021-12-01 NOTE — PROGRESS NOTES
Patient discharged from 4K-12 via bed transport to [place] bed 39 with all personal belongings and RT present for transport of trach. Report called to Eze Adrian, packet faxed to [phone]. Chart broken down and files placed in yellow bin. Pt's sister iA [place]no aware of discharge and new room number at [address] [employer].

## 2021-12-01 NOTE — PROGRESS NOTES
Comprehensive Nutrition Assessment    Type and Reason for Visit:  Reassess    Nutrition Recommendations/Plan:   Continue Nepro TF 30ml/hr as tolerated. Bolus 1 proteinex 2.5 ounces daily. Flush with 240ml free water TID ( per Dr Phelan  11/22/21). Monitor TF tolerance, labs, & wt. Nutrition Assessment:    Pt. not improving from a nutritional standpoint AEB TF on hold for a bronch today per RN.  At risk for further nutrition compromise r/t admitted with acute respiratory failure, intubated 10/27, extubated 11/9, reintubated 11/15,trach placed 11/17,  AFib, ALVIN, CRRT & HD this admit,  anemia, , previous TF intolerances this admit, PEG tube placement 11/24/21, pneumothorax, Pneumonia, PAF, Aspiration Pneumonia of Left Lung, Pleural Effusion, Shock, Flash Pulmonary Edema, Dysphagia underlying medical condition (CKD,current smoker, CHF, gout,pulmonary HTN, obesity).  Nutrition recommendations/interventions as per above  Malnutrition Assessment:  Malnutrition Status: At risk for malnutrition (Comment)    Context:  Acute Illness     Findings of the 6 clinical characteristics of malnutrition:  Energy Intake:   (NPO for bronch (11/30), 24% goal TF infusion 11/30)  Weight Loss:  No significant weight loss     Body Fat Loss:  No significant body fat loss     Muscle Mass Loss:  No significant muscle mass loss    Fluid Accumulation:  Unable to assess     Strength:  Not Performed    Estimated Daily Nutrient Needs:  Energy (kcal):  4126-0480 kcals (15-18); Weight Used for Energy Requirements:  Admission (75.1 kg)     Protein (g):  63-84 gm (1.5-2) as renal status allows (wounds/HD); Weight Used for Protein Requirements:  Ideal        Fluid (ml/day):  ~1500 ml (HD); Method Used for Fluid Requirements:  Other (Comment)      Nutrition Related Findings:  Pt seen ~1030,TF infusing 30ml/hr (goal). Pt was NPO for bronch (11/30). Labs: (12/1) Na 131, BUN 43, Creatinine 3.3. Meds: Humalog, Ketamine, Culturelle, Reglan.   BM x 1 (11/30)      Wounds:   (unstageable wound sacrum, DTI coccyx, stage II throat mid open area under trach)       Current Nutrition Therapies:    Diet NPO Exceptions are: Sips of Water with Meds  ADULT TUBE FEEDING; PEG; Renal Formula; Continuous; 30; No; 240; Other (specify); TID; Protein; Bolus 1 proteinex 2.5 ounces daily  Current Tube Feeding (TF) Orders:  · Feeding Route: PEG (placement 11/24/21)  · Formula: Renal Formula (Nepro)  · Schedule:  (30ml/hr)  · Additives/Modulars:  (flush 1 ( 2.5 oz) liquid protein daily)  · Water Flushes: 240 ml TID if no IVF  · Current TF & Flush Orders Provides: Nepro at goal 30ml/hr  · Goal TF & Flush Orders Provides: Nepro carb steady at 30 ml/hr with 2.5 oz.  Proteinex daily provides pt. with 1378 kcals (1274 TF, 104 modular), 84 gm protein (58 TF, 26 modular), 115 gm CHO, 18 gm fiber, 1243 ml free water (523 TF, 720 flushes) in 1515 ml volume (720 TF, 75 modular, 720 flushes)      Anthropometric Measures:  · Height: 4' 9\" (144.8 cm)  · Current Body Weight: 168 lb 9 oz (76.5 kg)   · Admission Body Weight: 165 lb 9.1 oz (75.1 kg) (10/27 +1 edema)    · Usual Body Weight: 175 lb 11.3 oz (79.7 kg) (per EMR 7/16/21)     · Ideal Body Weight: 85 lbs; 194.8 %   · BMI: 36.5  · Adjusted Body Weight:  ;  (IBW ~93 #)   ·     · BMI Categories: Obese Class 2 (BMI 35.0 -39.9) (on admit)       Nutrition Diagnosis:   · Inadequate oral intake related to swallowing difficulty as evidenced by NPO or clear liquid status due to medical condition, nutrition support - enteral nutrition      Nutrition Interventions:   Food and/or Nutrient Delivery:  Continue Current Tube Feeding  Nutrition Education/Counseling:  Education not appropriate   Coordination of Nutrition Care:  Continue to monitor while inpatient    Goals:  Tube feeding to provide 75% or more of estimated nutrient needs until able to transition to oral diet during LOS       Nutrition Monitoring and Evaluation:   Behavioral-Environmental Outcomes:  None Identified   Food/Nutrient Intake Outcomes:  Enteral Nutrition Intake/Tolerance  Physical Signs/Symptoms Outcomes:  Biochemical Data, Chewing or Swallowing, GI Status, Fluid Status or Edema, Nutrition Focused Physical Findings, Skin, Weight     Discharge Planning:     Too soon to determine     Electronically signed by Rissa Rocha RD, LD on 12/1/21 at 2:07 PM EST    Contact: [phone]

## 2021-12-01 NOTE — DISCHARGE SUMMARY
Hospitalist Discharge Summary    Patient Identification:   Marlo Mcfarlane   : 1950  MRN: [id number]   Account: [de-identified]    Patient's PCP: Kaity Stephens MD  Admit Date: 10/27/2021   Discharge Date: 21    Admitting Physician: Lenard Gipson MD   Discharge Physician: Elizabeth Chavez DO     Discharge Diagnoses: Active Hospital Problems    Diagnosis Date Noted    Collapse of left lung [J98.11]     Abnormal chest x-ray [R93.89]     Pneumothorax, right [J93.9]     Paroxysmal atrial fibrillation ([address] [I48.0] 2021    Hemoptysis [R04.2]     Chronic respiratory failure with hypoxia (HCC) [J96.11]     Aspiration pneumonia of left lung ([address] [J69.0]     Pleural effusion [J90]     Shock ([address] [R57.9]     Flash pulmonary edema ([address] [J81.0]     Acute respiratory failure ([address] [J96.00] 10/27/2021    Pressure ulcer of right buttock, stage 3 ([address] [L89.313] 2021    Pulmonary HTN ([address] [I27.20] 2019     Assessment and Plan:    1. Acute combined hypercapnic and hypoxic respiratory failure: Secondary to severe pneumonia with left-sided pleural effusion and repeated mucous plugging. Intubated 10/27/21. Extubated following successful SBT on 21. Failed HFNC and was reintubated on 11/15/21. Family consented for tracheostomy at that time. Tracheostomy placed 21. Failed attempts to weaning to T piece. 2. Recurrent Pulmonary Mucus Plugging: s/p bronchoscopy 21, 21, 21. Followed by pulmonology. 3. Stage IV decubitus ulcer w/ suspected osteomyelitis: S/p excision VAC placement  Stage III on admission now progressed to stage IV 2/2 to prolonged bedrest and malnutrition. Probes to bone, necrosis noted around wound site. CT abdomen and pelvis showed possible mi involvement with concern for osteomyelitis. Seen by general surgery on 21. No indications for debridement at that time.  Patient is a poor candidate for surgery given multiple co-morbidities, malnutrition and poor wound healing. Started on Vancomycin 11/18/21 and Zosysn. Cultures pending. Continue Dakins w/ dressing changes. Culture growing gram negative bacilli. Vanc stopped 11/21/21 Completed 10 day course of Zosyn. 4. Stage II ALVIN on CKD 3: Suspect post renal etiology 2/2 staghorn calculi. Nephrology following. CRRT initiated 11/5/21 will continue for UF removal that was continued until 11/12, the patient is off CRRT. 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 ANCA associated Abs show elevated anti MPO elevated 416 and serine proteinase 3 IgG WNL. Will need Bx for confirmation. Mitochondrial Abs pending, Anti-DS-DNA negative, Anti histone negative, Anti Smith,and  Anti-Savana WNL, GBM antibodies negative, C3/C4 levels normal, elevated kappa/lambda free light chains with normal ratio. Plans for Bx per nephrology when stable. Fluid collection noted on left renal unlikely abscess as patient remains afebrile. CVVH stopped 11/15/21 significant oliguria since that time. 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 Will need Renal Bx for confirmation once stable. Discussed case w/ Nephrology. Completed pulse dose steroids starting on 11/8/21 w/ 10mg/kg methylprednisolone for 3 days. SSI started for coverage. Not candidate for Rituxin/TPE 2/2 existing infection. No diffuse alveolar hemorrhage. 6. Hydronephrosis 2/2 Left-sided staghorn calculi: Urology following. Percutaneous drainage tube 11/2/21 IR. Low output following tube placement. Increased flow on CBI per urology recs. Still flushing clots. Perinephric stranding noted on CT abdomen and pelvis w/ air fluid region discontiguous with region of nephrostomy tube insertion raised initial concern for abscess (<3 cm) vs emphysematous pyelonephritis when discussed with radiologist. Completed therapy with cefepime. Urology planning removal once recovered  7. Acute blood loss anemia: 2/2 MERCEDES.  Suspect >800mg iron deficit noted Reticulocytes appropriately elevated. LDH, Haptoglobin WNL. Folate low replacing. PRBC x1 transfused 10/31/21, 11/4/21, 11/5/21, x2 11/6/21, x3 11/8/21, x3 11/15/21. [address] stopped. 8. Thrombocytopenia (resolved): suspect consumptive process in context of sepsis. Workup per above. Not on heparin per above. 9. Hematuria (resolved): s/p perc tube placement. Underwent CBI. Stopped heparin, holding ASA. 10. Dysphagia: 2/2 prolonged intubation. S/p PEG tube placement. 11. Hypoglycemia (resolved): 2/2 to poor po intake unable to tolerate tube feeds per above. 12. Bilateral Pleural Effusion (resolved): Associated volume overload in context of renal failure. Interval enlargement noted on CT 11/1/21. Currently on HD for UF removal. S/p thoracentesis 1L 12/1/21 and chest tube (removed 12/1/21)  13. Suspected COPD: current every day smoker. Obstructive process noted on flow volume loop on ventilator. Started empiric therapy with Stiolto. Recommend formal PFT once stable  14. PAH/chronic RV failure NYHA II: EF 55 to 60% G2 DD TTE 5/4/2021. RHC showed [place] 30 with elevated PCWP. Treated w/ Riociguat. 15. Cholelithiasis: w/ dilated CBD. Noted on 10/30/2021 US liver/GB. GI following. Does not suspect choledocolithiasis at this time. May consider HIDA scan once stable   16. Leukocytosis (resolved): 2/2 infection and high dose steroids  17. Severe malnutrition: Pre-albumin 14.4 on arrival w/ notable cachexia. Receiving continued tube feeds   18. Chronic tobacco use:   cessation  19. Mild AS: Noted on previous TTE not appreciated on repeat imaging  20. Gout:  W/o exacerbation. Continue allopurinol  21. BARBER: non compliant w/ CPAP. Now on trach/Ventilator  22. New onset atrial fibrillation with rapid ventricular response (resolved): now in NSR s/p DCCV 11/3/21. No recurrence. Stopped Heparin per hematuria w/ anemia. Amiodarone 11/8/21 stopped  23. Hyperkalemia (resolved): 2/2 ALVIN.  Now worsening following cessation of HD  24. Hyponatremia (resolved): Suspect 2/2 renal failure. 25. Severe bilateral multiorganism pneumonia (resolved): PCR positive Staph w/ MECA gene and adenovirus consistent w/ MRSA colonization. Culture growing MSSA. Repeat culture 11/5/21 shows coag positive staph completed 14 days Vancomycin (started 10/28/21) and completed 5 days Rocephin Clindamycin started for coverage of PVL as patient clinically worsening following 14 days Vancomycin for MRSA coverage. Underwent bronchoscopy for left mainstem mucus plug noted CXR on 11/11/21. Switched to Clindamycin for coverage of PVL received 8 days therapy (started 11/11/21). PNA panel from 11/15/21 negative. Repeat cultures show no growth  26. Septic Shock (resolved): 2/2 severe PNA now concern for necrotizing wound on sacrum. Initial CI 6.3 w/ NE precludes concern for septic shock NE d/c 2/2 worsening afib RVR. Phenylephrine weaned prior to ICU discharge  27. Nutrition: on TF @30 ml       The patient was seen and examined on day of discharge and this discharge summary is in conjunction with any daily progress note from day of discharge. Hospital Course:   71F admitted to [facility] 10/27/21 w/ SOB. Current smoker w/ PMH PAH grp 3, HFpEF, stage III sacral ulcer s/p wound ostomy 9/21. Patient sister reports SpO2 51% at home and was brought to ED where ABG showed pH 7.19, PCO2 80, PO2 134. She required emergent intubation and was transferred to ICU. Workup revealed left sided pleural effusion and underwent US guided thoracentesis w/ 1100 cc removed consistent w/ MRSA/adenovirus. Patient was noted to be in afib/RVR and was placed on amio, heparin drip. Patient was also noted to have normocytic anemia and received 1 unit PRBC 10/31/21. CT abdomen revealed CBD dilation w/ cholelithiasis. 11/1/2021: Plan for nephrostomy tubes placed. Hemoglobin 7.7 received 1 unit PRBC weaned levophed  11/2/2021: Resumed vancomycin.  Low urine output w/ increasing pressor requirements   11/3/2021: Failed SBT if able to wean today. Increased Amio drip 2/2 worsening tachycardia. Continued hematuria w/ low urine output. Cr stable. 11/4/2021: Successful cardioversion overnight. Continued NSR CVP 29. Suspected drop in CI 2/2 to stopping levophed. Diffuse edema following transfusion overnight. Given one-time dose of 2 mg Bumex and albumin. Failed SBT  11/5/2021: Continued low urine output. Trial of Bumex. 11/6/2021: Started on CRRT, continues to have bloody drainage of nephrostomy tube given 1 unit PRBC, cefipime emperically pending cultures of nephrostomy tube fluid and repeat resp cultures. 11/7/21: Continued CRRT and empiric cefepime for coverage of perinephric abscess  11/8/21: Transfused 1x PRBC. Anemia workup showed MERCEDES. UF increased to 125 cc/hr Coag neg staph + nephrostomy tube aspirate. Suspect contamination. 11/9/21: Pulse dose steroids initiated overnight for ANCA associated vasculitis. Started on SSI. Urine output now improving 75cc/hr. Transfused 3 units PRBCs yesterday for Improve DO2. MERCEDES noted w/ 875 mg deficit. Will replace once steroids/ABx completed. 11/10/21 Extubated overnight. Failed SLP evaluation. Treated w/ BiPAP while asleep w/ transition to NC while awake as tolerated. No tachypnea. Day 3 pulse dose steroids. Continued bloody drainage from nephrostomy tube. Holding [facility]   11/11/21 Worsening bilateral infiltrates with SpO2 <90% on HFNC. Continued pulmonary hygiene w/ nebulized hypertonic saline, acapella and breathing treatments in addition to deep suctioning. 11/12/21 Family meeting. Patient nodded agreement with full code status including reintubation leading to tracheostomy and PEG placement and continued hemodialysis if needed. Patient nodded agreement and understanding with these decisions. 11/13/21: Respiratory status continues to decline, she is requiring BiPAP with FiO2 of 70%.   Chest x-ray shows complete opacification of the left lung secondary to mucous plugging, also the patient has left-sided pleural effusion. Plan for bronchoscopy. 11/15/2021: Family meeting today per event note. Reintubated today after failed BiPAP  11/16/2021: DC ABx. PNA panel negative. Tracheostomy  11/17/2021: Necrotic Stage IV pressure ulcer probing to bone. CT abdomen and pelvis pending to evaluate for abscess. 11/18/2021: CT showed bony involvement of sacral ulcer w/ concern for osteomyelitis. Wound ostomy following. Culture pending. Started on broad spectrum therapy with Vancomycin and Zosyn for coverage of suspect osteomyelitis. Weaning sedation as tolerated  11/19/21: Wound culture pending. On broad spectrum ABx for coverage of osteomyelitis. Will de-escalate pending culture/sensitiivity. Consult to GI for PEG tube evaluation. 11/21/21: Transferred to stepdown  11/24/21: . PEG tube placed by GI.  11/26/21: Medial pneumothorax noted on Chest XR in the AM, subsequent CT chest confirmed 20% being the size. Patient was taken down to IR for chest tube placement which was successful. Attached to suction -20 mmHg. Kurtis Navarrete 11/28/21: Required bronchoscopy as there was left lung opacification resulting from recurrent mucus plug. 2 units platelets transfused. 12/1/21: Tunneled dialysis catheter placed. Therapeutic thoracentesis w/ 1L removed. Chest tube removed. Patient transferred to [place]  On the day of discharge patient was seen and evaluated at bedside and found to be in stable condition and all labs, imaging, and care plan were reviewed by the attending. Patient discharge instructions were discussed with the patient by the nursing staff.  Thank you Nereida Lamb MD for allowing us the opportunity to care for this patient    Exam:     Vitals:  Vitals:    12/01/21 1030 12/01/21 1110 12/01/21 1205 12/01/21 1212   BP: (!) 103/53 (!) 110/52 (!) 96/48    Pulse: 92 85 83 85   Resp: 18  16 19   Temp:   98.6 °F (37 °C)    TempSrc:   Axillary    SpO2:  97% 97% 97%   Weight: Height:         Weight: Weight: 168 lb 9 oz (76.5 kg)     24 hour intake/output:    Intake/Output Summary (Last 24 hours) at 12/1/2021 1325  Last data filed at 12/1/2021 1028  Gross per 24 hour   Intake 408.62 ml   Output 1050 ml   Net -641.38 ml     Physical Examination  Body mass index is 36.48 kg/m². GCS:  15  PC: 16 PEEP: 6: TV: 380: RRTotal: 15: General: Acute on chronically ill-appearing elderly white female  HEENT: Temporal wasting appreciated. Normocephalic and atraumatic. No scleral icterus. PERR  Neck: supple. No Thyromegaly. Left subclavian dialysis catheter   Lungs: Mild diffuse rhonchi appreciated bilaterally No retractions  Cardiac: RRR. No JVD. Abdomen: soft. Nontender. ,npehrostomy tube with bloody drainage  Extremities:  +1 pitting edema x4. (interval improvement noted) No clubbing, cyanosis   Vasculature: capillary refill < 3 seconds. Palpable dorsalis pedis pulses. Skin:  warm and dry. Psych: Alert and oriented to person place and time, affect appropriate  Lymph:  No supraclavicular adenopathy. Neurologic:  No focal deficit. No seizures. Labs: For convenience and continuity at follow-up the following most recent labs are provided:      CBC:    Lab Results   Component Value Date    WBC 8.6 12/01/2021    HGB 7.6 12/01/2021    HCT 24.7 12/01/2021     12/01/2021       Renal:    Lab Results   Component Value Date     12/01/2021    K 4.0 12/01/2021    K 4.0 12/01/2021    CL 95 12/01/2021    CO2 25 12/01/2021    BUN 43 12/01/2021    CREATININE 3.3 12/01/2021    CALCIUM 8.4 12/01/2021    PHOS 4.3 11/23/2021         Significant Diagnostic Studies    Radiology:   IR INTERVENTIONAL RADIOLOGY PROCEDURE REQUEST   Final Result   Status post thoracentesis            **This report has been created using voice recognition software. It may contain minor errors which are inherent in voice recognition technology. **      Final report electronically signed by Dr. Annemarie Rodríguez on 12/1/2021 10:11 AM      XR CHEST PORTABLE   Final Result   1. Mild hepatomegaly. Tracheostomy tube. Right jugular line with catheter tip at cavoatrial junction. Small caliber chest tube right side, inserted for treatment of pneumothorax. 2. A tunneled dialysis catheter has been inserted via a left subclavian route with tip in right atrium. Pigtail drainage catheter left upper quadrant of the abdomen. 3. Tiny pleural effusion right side. Mild bibasilar atelectasis/pneumonia. 4. Status post thoracentesis left side. Only a tiny residual effusion is present left side versus pleural thickening. No pneumothorax is seen following recent thoracentesis. **This report has been created using voice recognition software. It may contain minor errors which are inherent in voice recognition technology. **      Final report electronically signed by Dr. Woodson Done on 12/1/2021 9:37 AM      IR FLUORO GUIDED NEEDLE PLACEMENT   Final Result   Status post removal of the indwelling non-tunneled dialysis catheter and replacement with a new tunneled dialysis catheter, as outlined above. **This report has been created using voice recognition software. It may contain minor errors which are inherent in voice recognition technology. **      Final report electronically signed by Dr. Woodson Done on 12/1/2021 10:12 AM      XR CHEST PORTABLE   Final Result   Marked improved aeration of the left lung. **This report has been created using voice recognition software. It may contain minor errors which are inherent in voice recognition technology. **      Final report electronically signed by Dr. Eugenia Arceo on 12/1/2021 12:59 AM      XR CHEST 1 VIEW   Final Result      No pneumothorax is visible. Blunting of the right CP angle is unchanged. Complete opacification of the left hemithorax is stable. Life-support and monitoring devices appear stable.             **This report has been created using voice recognition software. It may contain minor errors which are inherent in voice recognition technology. **      Final report electronically signed by Dr Azalea Reveles on 11/30/2021 11:02 AM      CT CHEST WO CONTRAST   Final Result   1. Complete collapse of the left lung. 2. Moderate to large left pleural effusion. Moderate right pleural effusion. Right lower lobe atelectasis is noted. 3. Increased groundglass opacification of the right lung may be due to the expiratory nature of the acquisition or may suggest interstitial edema in the right clinical setting. 4. The thyroid is enlarged. 5. The main pulmonary artery is dilated can reflect underlying pulmonary hypertension. 6. Other incidental findings as described above            **This report has been created using voice recognition software. It may contain minor errors which are inherent in voice recognition technology. **      Final report electronically signed by Dr Lavonne Blue on 11/30/2021 11:02 AM      XR CHEST PORTABLE   Final Result   Impression:   1. Stable abnormal chest.      This document has been electronically signed by: Dalene Collet. DO Elina on    11/30/2021 05:06 AM      XR CHEST PORTABLE   Final Result   Impression:      Complete opacification left hemithorax with volume loss. Resolution right pneumothorax and improved right consolidation. This document has been electronically signed by: Yris Casanova MD on    11/29/2021 02:39 AM         XR CHEST PORTABLE   Final Result   Interval loss of volume of the left lung possibly due to mucous plugging. Complete opacification of the left lung. **This report has been created using voice recognition software. It may contain minor errors which are inherent in voice recognition technology. **      Final report electronically signed by Dr. Argentina Pereyra on 11/28/2021 1:02 AM      XR CHEST PORTABLE   Final Result      1.  There are scattered infiltrates throughout both lungs which are similar to the prior study. 2. There is a persistent tiny right apical pneumothorax. 3. Moderate left and small right-sided pleural effusions. **This report has been created using voice recognition software. It may contain minor errors which are inherent in voice recognition technology. **      Final report electronically signed by Dr Sanna Tejeda on 11/27/2021 1:32 AM      XR CHEST PORTABLE   Final Result   1. Suboptimal inflation of lungs. Mild Chris. Tracheostomy tube. Right jugular line and left subclavian dialysis catheter, both with tips at cavoatrial junction. Small caliber chest tube has been inserted in the pleural space right side. Tiny    residual less than 5% pneumothorax right apex. 2. Questionable tiny effusion right side. Moderate-sized pleural effusion left side. Mild hazy appearance both lungs relatively diffusely, consistent with mild interstitial pneumonia/edema/fibrosis. Overall appearance of chest has improved somewhat    following insertion of the chest tube. **This report has been created using voice recognition software. It may contain minor errors which are inherent in voice recognition technology. **      Final report electronically signed by Dr. Nelson Joaquin on 11/26/2021 4:50 PM      IR CHEST TUBE INSERTION   Final Result   Status post successful chest tube insertion for evacuation of a pneumothorax. **This report has been created using voice recognition software. It may contain minor errors which are inherent in voice recognition technology. **      Final report electronically signed by Dr. Nelson Joaquin on 11/26/2021 4:44 PM      CT CHEST WO CONTRAST   Final Result   1. Small effusion right side. Moderate size effusion left side. Moderate atelectasis/pneumonia both mid and lower lung fields. Findings of pulmonary arterial hypertension. 2. Multiloculated pneumothorax right side, approximately 20% in size.    3. Nephrostomy tube left side. Relatively large staghorn calculus left kidney. **This report has been created using voice recognition software. It may contain minor errors which are inherent in voice recognition technology. **      Final report electronically signed by Dr. Vanesa Stout on 11/26/2021 3:10 PM      XR CHEST PORTABLE   Final Result      1. Stable appearance of right pneumothorax of about 10% as measured on the AP radiograph. 2. Persistent diffuse airspace disease. 3. Small left pleural effusion. 4. The life support lines and tubes are stable. **This report has been created using voice recognition software. It may contain minor errors which are inherent in voice recognition technology. **      Final report electronically signed by Dr Evaristo Easley on 11/26/2021 12:43 PM      XR CHEST PORTABLE   Final Result   Impression:   1. New 10% pneumothorax with air collection on the medial aspect of the    right lung. 2. Cardiomegaly and diffuse bilateral interstitial and alveolar opacities    consistent with pulmonary edema. 3. Retrocardiac atelectasis/consolidation and pleural fluid obscuring the    left diaphragm. This document has been electronically signed by: Aiden Aguirre MD on    11/26/2021 03:52 AM         XR CHEST PORTABLE   Final Result   Dobbhoff tube projects over the stomach. **This report has been created using voice recognition software. It may contain minor errors which are inherent in voice recognition technology. **      Final report electronically signed by Dr. Ti Gauthier MD on 11/19/2021 1:28 PM      XR CHEST PORTABLE   Final Result   1. Mild hepatomegaly. Left subclavian dialysis catheter with tip at cavoatrial junction. NG tube passes into stomach. Tracheostomy tube in good position. Right jugular line with catheter tip in right atrium. 2. Tiny bilateral pleural effusions.  Moderate pneumonia/pulmonary edema scattered relatively diffusely in both lungs. 3. Overall appearance of chest has worsened somewhat since prior. **This report has been created using voice recognition software. It may contain minor errors which are inherent in voice recognition technology. **      Final report electronically signed by Dr. Irene Leos on 11/19/2021 1:20 AM      XR CHEST PORTABLE   Final Result   1. Interval placement of tracheostomy. Remaining support lines and tubes    as above. 2. Improved aeration of the left lung. Minimally worsened airspace disease    of the right lung with interstitial densities and confluent opacities of    the right hilar region. This document has been electronically signed by: Katherine Melgar DO on    11/18/2021 03:14 AM      XR CHEST PORTABLE   Final Result   1. Cardiomegaly. Tracheostomy tube. NG tube passes into stomach. Right jugular line with tip at cavoatrial junction. Left subclavian dialysis catheter, tip also at cavoatrial junction. Tiny bilateral effusions. .   2. Moderate pneumonia/pulmonary edema scattered diffusely in both lungs. Overall appearance has worsened since earlier. Cannot exclude heart failure. **This report has been created using voice recognition software. It may contain minor errors which are inherent in voice recognition technology. **      Final report electronically signed by Dr. Irene Leos on 11/17/2021 5:46 PM      CT ABDOMEN PELVIS WO CONTRAST Additional Contrast? None   Final Result   Left nephrostomy tube with perinephric stranding and minimal mesenteric edema/ascites. No convincing evidence of an abscess collection. Possible osteomyelitis with the questionable cortical destruction along the dorsal aspect of the coccyx. **This report has been created using voice recognition software. It may contain minor errors which are inherent in voice recognition technology. **      Final report electronically signed by Dr. Go Marcus on 11/17/2021 10:52 AM      XR CHEST PORTABLE   Final Result   1. Stable left lung with improved aeration throughout the entirety of the    right lung. Remaining interstitial densities greatest involving the right    central lung and right lung base with residual small right basilar    effusion. 2. Support lines and tubes as above. This document has been electronically signed by: Palak Bradford DO on    11/17/2021 04:31 AM      XR CHEST PORTABLE   Final Result   Persistent small bilateral pleural effusions with bibasilar opacities. **This report has been created using voice recognition software. It may contain minor errors which are inherent in voice recognition technology. **      Final report electronically signed by Dr. Perla Graham on 11/16/2021 7:24 AM      XR CHEST PORTABLE   Final Result   The endotracheal tube has been retracted and now terminates approximately 3.5 cm above the level of the avelina. Bilateral pleural effusions and bilateral lower lobe consolidation as well as diffuse interstitial opacities are unchanged. **This report has been created using voice recognition software. It may contain minor errors which are inherent in voice recognition technology. **      Final report electronically signed by Dr Silvia Deluca on 11/15/2021 12:45 PM      XR CHEST PORTABLE   Final Result   Bilateral pleural effusions and bilateral lower lobe airspace opacities as well as diffuse interstitial opacities are unchanged. Life-support and monitoring devices appear stable. **This report has been created using voice recognition software. It may contain minor errors which are inherent in voice recognition technology. **      Final report electronically signed by Dr Silvia Deluca on 11/15/2021 12:39 PM      XR CHEST PORTABLE   Final Result   Impression:   Diffuse interstitial and airspace disease bilaterally. Aeration slightly    improved. Bilateral pleural effusions.       This document has been electronically signed by: Giovany Mcfarlane MD on    11/15/2021 04:09 AM      XR CHEST PORTABLE   Final Result   Impression:   Bilateral lung infiltrates, and effusion, similar to prior study. This document has been electronically signed by: Radha Jimenes MD on    11/14/2021 04:49 AM      XR CHEST PORTABLE   Final Result   1. Mild improvement in the aeration of the left upper lobe. 2. Worsening of the airspace disease in the right lung. 3. Severe diffuse airspace disease is present. 4. Moderate left pleural effusion. Mild right pleural effusion. 5. Other findings as described above. **This report has been created using voice recognition software. It may contain minor errors which are inherent in voice recognition technology. **      Final report electronically signed by Dr Price Caruso on 11/13/2021 10:14 AM      XR CHEST PORTABLE   Final Result   Complete opacification the left lung consistent with history of mucous plugging. No other changes from earlier            **This report has been created using voice recognition software. It may contain minor errors which are inherent in voice recognition technology. **      Final report electronically signed by Dr. Lesli Gomez on 11/13/2021 1:20 AM      XR CHEST PORTABLE   Final Result   1. Possible interval decrease in left lung volume secondary to atelectasis    or lobar collapse, consolidation of the parenchyma, or enlargement pleural    effusion. Further evaluation with cross-sectional imaging may be    considered to better characterize. There is also decreased lung volume on    the right with dense consolidation of the lower lung. These findings may    reflect acute respiratory distress syndrome, or multifocal pneumonia. This document has been electronically signed by: Nitish Greenberg MD on    11/12/2021 07:25 AM      XR CHEST PORTABLE   Final Result   No pneumothorax.  Improved aeration of the left upper lobe            **This report has been created using voice recognition software. It may contain minor errors which are inherent in voice recognition technology. **      Final report electronically signed by Dr. Jas Da Silva on 11/11/2021 7:35 PM      XR CHEST PORTABLE   Final Result      1. Significantly worse appearance of the chest with complete opacification of the left hemithorax. This could be on the basis of a large pleural effusion. Left lung collapse could have a similar appearance. 2. There is a nasogastric tube coursing through the esophagus and terminating below the level of the diaphragm. 3. Infiltrates are seen throughout the right lung. Small right pleural effusion. **This report has been created using voice recognition software. It may contain minor errors which are inherent in voice recognition technology. **      Final report electronically signed by Dr Oriana Werner on 11/11/2021 4:26 PM      XR CHEST PORTABLE   Final Result   Impression:   Left basilar consolidation, increased since the prior study. This document has been electronically signed by: Tyler Rush MD on    11/11/2021 04:34 AM      CT ABDOMEN PELVIS WO CONTRAST Additional Contrast? None   Final Result   1. Very limited evaluation. Large bilateral pleural effusions and bilateral airspace infiltrates. #2 subcutaneous edema throughout the abdomen and pelvis indicating presence of third spacing. 3. Presence or absence of a perinephric abscess cannot be confirmed. **This report has been created using voice recognition software. It may contain minor errors which are inherent in voice recognition technology. **      Final report electronically signed by Dr. Jas Da Silva on 11/9/2021 11:31 PM      XR CHEST PORTABLE   Final Result   Impression:   Progressive left base consolidation. Improved aeration elsewhere. This document has been electronically signed by: Martine Pérez MD on    11/09/2021 04:11 AM      XR CHEST PORTABLE   Final Result   Impression:   1.  Extensive bilateral areas of airspace disease which have improved since    the previous exam.   2. Moderate left pleural effusion. This document has been electronically signed by: Yoni Wilkins MD on    11/07/2021 10:06 AM      XR CHEST PORTABLE   Final Result   1. New left subclavian catheter with the tip in the right atrium   2. No change in endotracheal tube, enteric tube and right internal jugular line. 3. Cardiomegaly. 4. Extensive bilateral pulmonary opacification, approximately unchanged. .               **This report has been created using voice recognition software. It may contain minor errors which are inherent in voice recognition technology. **      Final report electronically signed by DR Afshin Razo on 11/5/2021 3:37 PM      XR CHEST PORTABLE   Final Result   Impression:   1. Lines and tubes appropriate as above. 2. Increasing airspace consolidation and persistent cardiomegaly with    pleural effusion on the left. This document has been electronically signed by: Fuentes Castro MD on    11/05/2021 08:05 AM      CT ABDOMEN PELVIS WO CONTRAST Additional Contrast? None   Final Result       1. Interval percutaneous left nephrostomy tube placement with small amount of blood in the renal pelvis about the nephrostomy tube. 2. Hypodense lesion in the anterior aspect of the interpolar region of the left kidney with small focus of air at the margin. Developing abscess can't be excluded. 3. Scattered foci of air elsewhere in the kidney likely sequelae of nephrostomy tube placement. 4. Mildly worsening anasarca with persistent small to moderate ascites and moderate pleural effusions which may be cardiogenic given cardiomegaly. 5. Worsening opacities adjacent to the pleural effusions as evidence for worsening atelectasis or infiltrates. 6. Small hemopericardium, stable compared to prior exam.   7. Stable retroperitoneal periaortic lymphadenopathy at the level of the left kidney.                **This report has been created using voice recognition software. It may contain minor errors which are inherent in voice recognition technology. **      Final report electronically signed by Dr. Ti Gauthier MD on 11/4/2021 1:24 PM      XR CHEST PORTABLE   Final Result   Worsening prominence of the pulmonary interstitium suggesting pulmonary edema versus infiltrates. Small bilateral pleural effusions. **This report has been created using voice recognition software. It may contain minor errors which are inherent in voice recognition technology. **      Final report electronically signed by Dr. Palomo Lawton on 11/3/2021 7:58 AM      CT CHEST WO CONTRAST   Final Result       1. Moderate bilateral pleural effusions have increased in size in the interval with associated adjacent atelectasis. 2. Persistent pulmonary vascular congestion/edema with cardiomegaly. 3. Decreased pericardial effusion. **This report has been created using voice recognition software. It may contain minor errors which are inherent in voice recognition technology. **      Final report electronically signed by Dr. Ti Gauthier MD on 11/1/2021 3:21 PM      IR GUIDED NEPHROSTOMY CATH PLACEMENT LEFT   Final Result   1. Large staghorn calculus in the left kidney. 2. Status post successful nephrostomy tube insertion. **This report has been created using voice recognition software. It may contain minor errors which are inherent in voice recognition technology. **      Final report electronically signed by Dr. Vanesa Stout on 11/1/2021 5:10 PM      CT ABDOMEN PELVIS WO CONTRAST   Final Result      1. Bilateral pleural effusions and abnormal densities in the right and left lower lobes consistent with inflammatory process. 2. Cardiomegaly. Small pericardial effusion. 3. Small amount of fluid surrounding the liver. 4. Gallstone. Increased attenuation in the gallbladder.  No pericholecystic fluid or biliary dilatation. 5. Large staghorn calculus in the left kidney. Severe left-sided hydronephrosis and hydroureter. Increased density in the left perinephric fat consistent with inflammatory process. 6. Atherosclerotic calcification in the abdominal aorta, iliac and femoral arteries. 7. Padilla catheter within the bladder. 8. Lumbar spondylosis. 9. Increased density in the skin and subcutaneous soft tissues suggestive of edema or anasarca. 10. Enteric tube. [place]-Kasia catheter in place, seen better on plain radiographs         **This report has been created using voice recognition software. It may contain minor errors which are inherent in voice recognition technology. **      Final report electronically signed by DR Darlene Paul on 10/31/2021 11:19 AM      XR CHEST PORTABLE   Final Result   1. Small bilateral pleural effusions. 2. Bilateral pneumonia. 3. Stable cardiomegaly. **This report has been created using voice recognition software. It may contain minor errors which are inherent in voice recognition technology. **      Final report electronically signed by Dr. Placido Pino on 10/31/2021 12:43 AM      US LIVER   Final Result   1. Cholelithiasis and pericholecystic fluid suspicious for acute cholecystitis. 2. Normal liver ultrasound. Final report electronically signed by Dr. Placido Pino on 10/30/2021 11:26 PM      [address]   Final Result   1. Cholelithiasis and pericholecystic fluid suspicious for acute cholecystitis. 2. Normal liver ultrasound. Final report electronically signed by Dr. Placido Pino on 10/30/2021 11:26 PM      CT CHEST W WO CONTRAST   Final Result   1. Moderate to marked right pleural effusion. Moderate left pleural effusion. 2. Complete collapse of the right lower lobe. Near-complete collapse of the left lower lobe. 3. Consolidative opacity seen in the left lower lobe may reflect infectious etiology. 4. Markedly enlarged thyroid. Left thyroid nodule. 5. Main pulmonary artery is dilated relating to pulmonary artery hypertension. 6. The common bile duct is dilated at 10 mm. A large gallstone is present. Staghorn calculus of the left kidney. 7. Other findings as described above. .            **This report has been created using voice recognition software. It may contain minor errors which are inherent in voice recognition technology. **      Final report electronically signed by Dr Rigo Dejesus on 10/29/2021 11:35 PM      XR CHEST PORTABLE   Final Result   Diffuse infiltrates throughout the lungs bilaterally with small bilateral pleural effusions. Somewhat worse appearance of the chest when compared to the prior study. **This report has been created using voice recognition software. It may contain minor errors which are inherent in voice recognition technology. **      Final report electronically signed by Dr Shan Argueta on 10/29/2021 9:43 AM      XR CHEST PORTABLE   Final Result   Impression:   1. Persistent left lower lobe atelectasis or consolidation. 2. Cardiomegaly with passive venous congestion. 3. Supportive devices in situ. This document has been electronically signed by: Junaid Renner MD on    10/28/2021 04:30 AM      XR CHEST PORTABLE   Final Result   1. The endotracheal tube terminates 9 mm above the avelina and should be withdrawn about 1-2 cm. Reimaging should be obtained. 2. Increased interstitial pulmonary markings are seen bilaterally. Patchy opacities seen in the right lung. 3. Mild cardiomegaly            **This report has been created using voice recognition software. It may contain minor errors which are inherent in voice recognition technology. **      Final report electronically signed by Dr Rigo Dejesus on 10/27/2021 5:32 PM      XR CHEST PORTABLE   Final Result   Status post drainage of the left pleural effusion with improved aeration noted in the left hemithorax.  No pneumothorax observed. Cardiomegaly is stable. Life support and monitoring devices are unchanged. **This report has been created using voice recognition software. It may contain minor errors which are inherent in voice recognition technology. **      Final report electronically signed by Dr Johnny Dill on 10/27/2021 4:18 PM      US THORACENTESIS Which side should the procedure be performed? Left   Final Result   Status post thoracentesis            **This report has been created using voice recognition software. It may contain minor errors which are inherent in voice recognition technology. **      Final report electronically signed by Dr. Lin Jones on 10/27/2021 4:33 PM      XR CHEST PORTABLE   Final Result      The endotracheal tube has been adjusted with tip now terminating approximately 18 mm above the level of the avelina. Improved aeration is noted in the left upper lobe and right lower lobe. Persistent dense consolidation in the left mid and lower lobe are    observed. **This report has been created using voice recognition software. It may contain minor errors which are inherent in voice recognition technology. **      Final report electronically signed by Dr Johnny Dill on 10/27/2021 3:41 PM      XR CHEST PORTABLE   Final Result      The endotracheal tube tip terminates in the right mainstem bronchus. Improved aeration is noted in the right lower lobe and left hemithorax. There continues to be dense consolidation in the left upper lobe and left lower lobe. Cardiomegaly is stable. COMMUNICATION: The results of this examination were discussed with Dr. Gilford Arms at 3:15 PM on 10/27/2021. **This report has been created using voice recognition software. It may contain minor errors which are inherent in voice recognition technology. **      Final report electronically signed by Dr Johnny Dill on 10/27/2021 3:16 PM      XR CHEST PORTABLE   Final Result      Near complete opacification of the left hemithorax with very little aerated lung visualized in the left upper lobe. Small right pleural effusion and right lower lobe consolidation obscures visualization of the right hemidiaphragm pulmonary vascular    congestion is observed. **This report has been created using voice recognition software. It may contain minor errors which are inherent in voice recognition technology. **      Final report electronically signed by Dr Mira Marr on 10/27/2021 2:20 PM      XR CHEST PORTABLE    (Results Pending)          Consults:     PHARMACY TO DOSE MEDICATION  IP CONSULT TO SOCIAL WORK  IP CONSULT TO GI  IP CONSULT TO UROLOGY  IP CONSULT TO NEPHROLOGY  PHARMACY TO DOSE VANCOMYCIN  IP CONSULT TO DIETITIAN  IP CONSULT TO GENERAL SURGERY  PHARMACY TO DOSE VANCOMYCIN  IP CONSULT TO GI  PHARMACY CONSULT FOR RENAL DOSING  IP CONSULT TO PULMONOLOGY  IP CONSULT TO DIETITIAN  IP CONSULT TO DIETITIAN  IP CONSULT TO PHARMACY    Disposition: [place]  Condition at Discharge: Stable    Code Status:  Full Code     Patient Instructions:    Discharge lab work: BMP CBCC  Activity: activity as tolerated  Diet: Diet NPO Exceptions are: Sips of Water with Meds      Follow-up visits:   Joel Double, APRN - CNP  [address]  [facility] [place] [address]    Schedule an appointment as soon as possible for a visit on 12/29/2021  [facility] WITH IVETTE IN OFFICE WAITING FOR REPLY //   [address] [facility],[address] [place]  [phone]             Discharge Medications:   See last dose MAR    Time Spent on discharge is more than 2 hours in the examination, evaluation, counseling and review of medications and discharge plan. Signed: Thank you Zehra Gleason MD for the opportunity to be involved in this patient's care.     Electronically signed by Jamee Thomson DO PGY-2 on 12/1/2021 at 1:25 PM

## 2021-12-01 NOTE — H&P
[address] [address]  Sedation/Analgesia History & Physical    Pt Name: Sandra Wilson  MRN: [id number]  YOB: 1950  Provider Performing Procedure: Yrn Pagan MD, MD  Primary Care Physician: Minerva Mcleod MD    PRE-PROCEDURE   DNR-CCA/DNR-CC []Yes [x]No  Brief History/Pre-Procedure Diagnosis: Renal failure           MEDICAL HISTORY  []CAD/Valve  []Liver Disease  []Lung Disease []Diabetes  []Hypertension []Renal Disease  []Additional information:       has a past medical history of CHF (congestive heart failure) ([facility] [address] Depression, Gout attack, Hypertension, Osteoarthritis, Pulmonary artery hypertension ([facility] [address] Renal calculi, and Thrombocytopenia ([facility] [address] SURGICAL HISTORY   has a past surgical history that includes Appendectomy (1960); Facial nerve surgery; Pressure ulcer debridement (Right, 8/6/2021); IR GUIDED NEPHROSTOMY CATH PLACEMENT LEFT (11/1/2021); bronchoscopy (N/A, 11/22/2021); IR CHEST TUBE INSERTION (11/26/2021); and Gastrostomy tube placement (N/A, 11/24/2021).   Additional information:       ALLERGIES   Allergies as of 10/27/2021    (No Known Allergies)     Additional information:       MEDICATIONS   Coumadin Use Last 5 Days [x]No []Yes  Antiplatelet drug therapy use last 5 days  [x]No []Yes  Other anticoagulant use last 5 days  [x]No []Yes    Current Facility-Administered Medications:     epoetin prince-epbx (RETACRIT) injection 2,000 Units, 2,000 Units, SubCUTAneous, Once per day on Mon Wed Fri **AND** epoetin prince-epbx (RETACRIT) injection 3,000 Units, 3,000 Units, SubCUTAneous, Once per day on Mon Wed Fri, Jose Cervantes MD    clindamycin (CLEOCIN) 600 mg in dextrose 5 % 50 mL IVPB, 600 mg, IntraVENous, Once, Chintan Collazo MD    0.45 % sodium chloride infusion, , IntraVENous, Continuous, Chintan Collazo MD, Last Rate: 20 mL/hr at 11/30/21 1856, New Bag at 11/30/21 1856    HYDROmorphone (DILAUDID) injection 1 mg, 1 mg, IntraVENous, Once, Chintan Done, MD    midazolam (VERSED) injection 1 mg, 1 mg, IntraVENous, Once, Gloria Nassar MD    acetylcysteine (MUCOMYST) 20 % solution 600 mg, 600 mg, Inhalation, BID, Victor M Liraid, DO, 600 mg at 12/01/21 0538    Sodium Hypochlorite (DAKINS) 0.25 % external solution, , Irrigation, Daily, Pipo Rambo, DO, Given at 11/29/21 2050    midazolam (VERSED) injection 4 mg, 4 mg, IntraVENous, Once, Ana Lovell MD    ketamine (KETALAR) injection 50 mg, 50 mg, IntraVENous, Once, Ana Lovell MD    lidocaine PF 1 % injection 20 mL, 20 mL, IntraDERmal, Once, Ana Lovell MD    metoprolol tartrate (LOPRESSOR) tablet 12.5 mg, 12.5 mg, Oral, BID, Pipo Rambo, DO, 12.5 mg at 11/30/21 2221    sodium chloride flush 0.9 % injection 5-40 mL, 5-40 mL, IntraVENous, 2 times per day, Faina Cruz MD, 10 mL at 11/30/21 2222    sodium chloride flush 0.9 % injection 5-40 mL, 5-40 mL, IntraVENous, PRN, Faina Cruz MD, 10 mL at 11/28/21 1703    0.9 % sodium chloride infusion, 25 mL, IntraVENous, PRN, Faina Cruz MD, Stopped at 11/30/21 1550    LORazepam (ATIVAN) injection 1 mg, 1 mg, IntraVENous, Q6H PRN, Cindy Trejo MD, 1 mg at 11/29/21 0505    lidocaine PF 4 % injection 4 mL, 4 mL, Inhalation, 4x Daily PRN, Srikanth Solis APRN - CNP, 4 mL at 11/21/21 0928    metoclopramide (REGLAN) injection 5 mg, 5 mg, IntraVENous, Q8H, Yuki Madrid, RPH, 5 mg at 12/01/21 0113    sodium chloride (Inhalant) 3 % nebulizer solution 4 mL, 4 mL, Nebulization, Q6H, 4 mL at 11/30/21 0136 **AND** albuterol (PROVENTIL) nebulizer solution 2.5 mg, 2.5 mg, Nebulization, Q6H, Lashon Libby, MD, 2.5 mg at 12/01/21 0538    ondansetron (ZOFRAN) injection 4 mg, 4 mg, IntraVENous, Q6H PRN, Orlando Anand DO, 4 mg at 11/19/21 0956    lactobacillus (CULTURELLE) capsule 1 capsule, 1 capsule, Per NG tube, Daily with breakfast, Orlando nAand DO, 1 capsule at 11/29/21 1025    chlorhexidine (PERIDEX) 0.12 % solution 15 mL, 15 mL, Mouth/Throat, BID, Jaylene LANE Barger CNP, 15 mL at 11/30/21 2222    insulin lispro (HUMALOG) injection vial 0-6 Units, 0-6 Units, SubCUTAneous, Q6H, LukeLANE Davis CNP, 1 Units at 11/23/21 0540    albuterol (PROVENTIL) nebulizer solution 2.5 mg, 2.5 mg, Nebulization, Q4H PRN, Cindy Trejo MD, 2.5 mg at 11/13/21 0426    allopurinol (ZYLOPRIM) tablet 100 mg, 100 mg, Oral, Daily, Herlinda Cleaning DO, 100 mg at 11/29/21 1024    phosphorus replacement protocol, , Other, RX Placeholder, Joseph Dennis DO    fentaNYL (SUBLIMAZE) injection 25 mcg, 25 mcg, IntraVENous, Q1H PRN, Joseph Dennis DO, 25 mcg at 11/21/21 0525    potassium bicarb-citric acid (EFFER-K) effervescent tablet 40 mEq, 40 mEq, Oral, Once, Joseph Dennis DO    glucose (GLUTOSE) 40 % oral gel 15 g, 15 g, Oral, PRN, Karen Anand DO    dextrose 50 % IV solution, 12.5 g, IntraVENous, PRN, Tejal Anand DO    glucagon (rDNA) injection 1 mg, 1 mg, IntraMUSCular, PRN, Tejal Anand DO    dextrose 5 % solution, 100 mL/hr, IntraVENous, PRN, Joseph Dennis DO, Stopped at 11/25/21 0336    tiotropium-olodaterol (STIOLTO) 2.5-2.5 MCG/ACT inhaler 2 puff, 2 puff, Inhalation, Daily, Rad Anand DO, 2 puff at 12/01/21 0539    polyethylene glycol (GLYCOLAX) packet 17 g, 17 g, Oral, Daily PRN, Tejal Anand DO, 17 g at 11/16/21 1704    [Held by provider] aspirin chewable tablet 81 mg, 81 mg, Oral, Daily, Cindy Trejo MD, 81 mg at 11/07/21 0824    senna (SENOKOT) tablet 8.6 mg, 1 tablet, Oral, BID PRN, Karen Anand DO, 8.6 mg at 11/09/21 0823    pantoprazole (PROTONIX) injection 40 mg, 40 mg, IntraVENous, QAM, Temitope Willson APRN - CNP, 40 mg at 11/29/21 1024    acetaminophen (TYLENOL) tablet 650 mg, 650 mg, Oral, Q4H PRN, Hattie Harris MD, 650 mg at 11/30/21 9121    [Held by provider] Riociguat TABS 2.5 mg, 2.5 mg, Oral, Q8H, Paloma ROMANO DO, 2.5 mg at 11/28/21 1322   FLUoxetine (PROZAC) capsule 40 mg, 40 mg, Oral, Daily, Cindy Trejo MD, 40 mg at 11/29/21 1024    [Held by provider] 0.9 % sodium chloride infusion, , IntraVENous, Continuous, Cindy Trejo MD, Last Rate: 50 mL/hr at 10/28/21 2314, New Bag at 10/28/21 2314  Prior to Admission medications    Medication Sig Start Date End Date Taking? Authorizing Provider   metoprolol tartrate (LOPRESSOR) 25 MG tablet Take 0.5 tablets by mouth 2 times daily 10/25/21  Yes LANE Reyez CNP   sodium hypochlorite (DAKINS) 0.125 % SOLN external solution Apply Dakin's moistened gauze dressings to wound twice daily and as needed.  8/24/21  Yes LANE Newman CNP   sodium chloride 1 g tablet Take 1 tablet by mouth 2 times daily 8/3/21  Yes Eddie Vaughn MD   allopurinol (ZYLOPRIM) 300 MG tablet Take 1 tablet by mouth daily 7/16/21  Yes Curtis Lamas MD   FLUoxetine (PROZAC) 40 MG capsule Take 1 capsule by mouth daily 7/16/21  Yes Curtis Lamas MD   potassium chloride (KLOR-CON M) 10 MEQ extended release tablet Take 1 tablet by mouth every other day 5/16/21  Yes LANE Alarcon CNP   bumetanide (BUMEX) 1 MG tablet Take 1 tablet by mouth daily 5/16/21  Yes LANE Alarcon CNP   Multiple Vitamins-Minerals (MULTIVITAMIN WOMEN PO) Take 1 tablet by mouth daily   Yes Historical Provider, MD   acetaminophen (TYLENOL) 650 MG extended release tablet Take 650 mg by mouth every 8 hours as needed for Pain   Yes Historical Provider, MD   Riociguat (ADEMPAS) 2.5 MG TABS Take 2.5 mg by mouth 3 times daily   Yes Historical Provider, MD   docusate sodium (COLACE) 100 MG capsule Take 100 mg by mouth as needed    Yes Historical Provider, MD   aspirin 81 MG tablet Take 81 mg by mouth daily    Yes Historical Provider, MD     Additional information:       VITAL SIGNS   Vitals:    12/01/21 0745   BP: (!) 114/52   Pulse: 84   Resp: 18   Temp: 97.6 °F (36.4 °C)   SpO2: 93% PHYSICAL:   Heart:  [x]Regular rate and rhythm  []Other:    Lungs:  [x]Clear    []Other:    Abdomen: [x]Soft    []Other:    Mental Status: [x]Alert & Oriented  []Other:      PLANNED PROCEDURE   []Biospy []Arteriogram              []Drainage   []Mediport Insertion  []Fistulogram []IV access       []Vertebroplasty / Augmentation  []IVC filter [x]Dialysis catheter []Biliary drainage  []Other: []CAPD Catheter []Nephrostomy Tube / Stent  SEDATION  Planned agent:[x]Midazolam []Meperidine [x]Sublimaze []Dilaudid []Morphine     []Diazepam  []Other:     ASA Classification:  []1 [x]2 []3 []4 []5  Class 1: A normal healthy patient  Class 2: Pt with mild to moderate systemic disease  Class 3: Severe systemic disease or disturbance  Class 4: Severe systemic disorders that are already life threatening. Class 5: Moribund pt with little chances of survival, for more than 24 hours. Mallampati I Airway Classification:   []1 []2 []3 []4    [x]Pre-procedure diagnostic studies complete and results available. Comment:    [x]Previous sedation/anesthesia experiences assessed. Comment:    [x]The patient is an appropriate candidate to undergo the planned procedure sedation and anesthesia. (Refer to nursing sedation/analgesia documentation record)  [x]Formulation and discussion of sedation/procedure plan, risks, and expectations with patient and/or responsible adult completed. [x]Patient examined immediately prior to the procedure.  (Refer to nursing sedation/analgesia documentation record)    Rosaline Hill MD, MD  Electronically signed 12/1/2021 at 8:26 AM

## 2021-12-01 NOTE — PROGRESS NOTES
taking just this morning. She endorsed mild headache on arrival (poorly characterized), but no associated fevers/chills, chest pain, palpitations, cough, n/v/d, abdominal pain, urinary symptoms, weakness or syncope were reported. Limited additional history available. Per patient's niece the patient was sedentary at home before admission to the hospital.     Patient was intubated on 10/27/21. Extubated following successful SBT on 11/9/21. Status post bronchoscopy with mucous plug removal from the left mainstem on 11/13. Failed HFNC and was reintubated on 11/15/21. Tracheostomy tube was placed on 11/17/21. She is having shortness of breath: No  Functional status prior to beginning of symptoms: 0 block/s on level ground. Current functional capacity on level ground: 0 block/s on level ground. She can climb steps: No  Flights of steps she can climb: 0    She is having cough: Yes  Duration of cough: for 30+ days. Her cough is associated with sputum production: Yes   The sputum color: white  Hemoptysis:Yes    She is having chest pain:No      Past 24 hrs   -Remains on the ventilator   -Saturating 98% on 30% FiO2, 16/6  -Bronchoscopy yesterday revealed revealed another mucus plug on L lung which was subsequently removed. F/u CXR revealed improved aeration throughout L lung.   -Still very weak. -No fever or chills.   -S/p right-sided chest tube, no air leak noticed, set to water seal   -No acute distress or complaints   -No overnight events   -Will ask IR if R chest tube is okay to pull given complete resolution of pneumothorax  -1L hickman serous fluid aspirated from L lung by IR 12/01/2021.   -Pt will dispo to [place] per primary team.     All other systems reviewed    PMHx   Past Medical History      Diagnosis Date    CHF (congestive heart failure) ([facility] [address]     Dr. Zoraida Runner Depression     Gout attack     Hypertension     Osteoarthritis     Pulmonary artery hypertension ([facility] [address]     [employer]-- Dr. Nina Jacques-- Dr. Zoraida Runner Renal calculi     Dr. Cecilia Shea Thrombocytopenia [facility])       Past Surgical History        Procedure Laterality Date    APPENDECTOMY  1958    BRONCHOSCOPY N/A 11/22/2021    BRONCHOSCOPY performed by Sharon Hernandez MD at [address] 11/30/2021    BRONCHOSCOPY WITHOUT FLURO performed by Sharon Hernandez MD at [address] [facility]    GASTROSTOMY TUBE PLACEMENT N/A 11/24/2021    EGD PEG TUBE PLACEMENT performed by Keri Madrigal MD at [facility]    IR [address]  11/26/2021    IR CHEST TUBE INSERTION 11/26/2021 [facility]    IR NEPHROSTOMY PERCUTANEOUS LEFT  11/1/2021    IR NEPHROSTOMY PERCUTANEOUS LEFT 11/1/2021 Claudia Cooper MD [facility]    PRESSURE ULCER DEBRIDEMENT Right 8/6/2021    EXCISION RIGHT BUTTOCK WOUND AND CLOSURE performed by Jo-Ann Badillo MD at [address] [facility]    Current Medications    epoetin prince-epbx  2,000 Units SubCUTAneous Once per day on Mon Wed Fri    And    epoetin prince-epbx  3,000 Units SubCUTAneous Once per day on Mon Wed Fri    HYDROmorphone  1 mg IntraVENous Once    acetylcysteine  600 mg Inhalation BID    Sodium Hypochlorite   Irrigation Daily    midazolam  4 mg IntraVENous Once    ketamine  50 mg IntraVENous Once    lidocaine PF  20 mL IntraDERmal Once    metoprolol tartrate  12.5 mg Oral BID    sodium chloride flush  5-40 mL IntraVENous 2 times per day    metoclopramide  5 mg IntraVENous Q8H    sodium chloride (Inhalant)  4 mL Nebulization Q6H    And    albuterol  2.5 mg Nebulization Q6H    lactobacillus  1 capsule Per NG tube Daily with breakfast    chlorhexidine  15 mL Mouth/Throat BID    insulin lispro  0-6 Units SubCUTAneous Q6H    allopurinol  100 mg Oral Daily    phosphorus replacement protocol   Other RX Placeholder    potassium bicarb-citric acid  40 mEq Oral Once    tiotropium-olodaterol  2 puff Inhalation Daily    [Held by provider] aspirin 81 mg Oral Daily    pantoprazole  40 mg IntraVENous QAM    [Held by provider] Riociguat  2.5 mg Oral Q8H    FLUoxetine  40 mg Oral Daily     sodium chloride flush, sodium chloride, LORazepam, lidocaine PF, ondansetron, albuterol, fentanNYL, glucose, dextrose, glucagon (rDNA), dextrose, polyethylene glycol, senna, acetaminophen  IV Drips/Infusions   sodium chloride 20 mL/hr at 12/01/21 0700    sodium chloride Stopped (11/30/21 1550)    dextrose Stopped (11/25/21 0336)    [Held by provider] sodium chloride 50 mL/hr at 10/28/21 2314     Home Medications  Medications Prior to Admission: metoprolol tartrate (LOPRESSOR) 25 MG tablet, Take 0.5 tablets by mouth 2 times daily  sodium hypochlorite (DAKINS) 0.125 % SOLN external solution, Apply Dakin's moistened gauze dressings to wound twice daily and as needed. sodium chloride 1 g tablet, Take 1 tablet by mouth 2 times daily  allopurinol (ZYLOPRIM) 300 MG tablet, Take 1 tablet by mouth daily  FLUoxetine (PROZAC) 40 MG capsule, Take 1 capsule by mouth daily  potassium chloride (KLOR-CON M) 10 MEQ extended release tablet, Take 1 tablet by mouth every other day  bumetanide (BUMEX) 1 MG tablet, Take 1 tablet by mouth daily  Multiple Vitamins-Minerals (MULTIVITAMIN WOMEN PO), Take 1 tablet by mouth daily  acetaminophen (TYLENOL) 650 MG extended release tablet, Take 650 mg by mouth every 8 hours as needed for Pain  Riociguat (ADEMPAS) 2.5 MG TABS, Take 2.5 mg by mouth 3 times daily  docusate sodium (COLACE) 100 MG capsule, Take 100 mg by mouth as needed   aspirin 81 MG tablet, Take 81 mg by mouth daily   Diet    Diet NPO Exceptions are: Sips of Water with Meds  ADULT TUBE FEEDING; PEG; Renal Formula; Continuous; 30; No; 240; Other (specify); TID; Protein; Bolus 1 proteinex 2.5 ounces daily  Allergies    Patient has no known allergies.   Social History     Social History     Socioeconomic History    Marital status: Single     Spouse name: Not on file    Number of children: 0    Years of education: Not on file    Highest education level: Not on file   Occupational History    Not on file   Tobacco Use    Smoking status: Never Smoker    Smokeless tobacco: Never Used   Vaping Use    Vaping Use: Never used   Substance and Sexual Activity    Alcohol use: No    Drug use: No    Sexual activity: Not Currently   Other Topics Concern    Not on file   Social History Narrative    Not on file     Social Determinants of Health     Financial Resource Strain: Low Risk     Difficulty of Paying Living Expenses: Not very hard   Food Insecurity: No Food Insecurity    Worried About Running Out of Food in the Last Year: Never true    Traci of Food in the Last Year: Never true   Transportation Needs:     Lack of Transportation (Medical): Not on file    Lack of Transportation (Non-Medical):  Not on file   Physical Activity:     Days of Exercise per Week: Not on file    Minutes of Exercise per Session: Not on file   Stress:     Feeling of Stress : Not on file   Social Connections:     Frequency of Communication with Friends and Family: Not on file    Frequency of Social Gatherings with Friends and Family: Not on file    Attends Religion Services: Not on file    Active Member of [address] [place] [address] or Organizations: Not on file    Attends Club or Organization Meetings: Not on file    Marital Status: Not on file   Intimate Partner Violence:     Fear of Current or Ex-Partner: Not on file    Emotionally Abused: Not on file    Physically Abused: Not on file    Sexually Abused: Not on file   Housing Stability:     Unable to Pay for Housing in the Last Year: Not on file    Number of [place] in the Last Year: Not on file    Unstable Housing in the Last Year: Not on file     Family History          Problem Relation Age of Onset    Diabetes Father     Arthritis Mother     COPD Mother     Diabetes Sister     Heart Disease Maternal Uncle     Breast Cancer Niece 36    Sleep Apnea Brother  Asthma Neg Hx     Birth Defects Neg Hx     Cancer Neg Hx     Depression Neg Hx     Early Death Neg Hx     Hearing Loss Neg Hx     High Blood Pressure Neg Hx     High Cholesterol Neg Hx     Kidney Disease Neg Hx     Learning Disabilities Neg Hx     Mental Illness Neg Hx     Mental Retardation Neg Hx     Miscarriages / Stillbirths Neg Hx     Stroke Neg Hx     Substance Abuse Neg Hx     Vision Loss Neg Hx     Other Neg Hx      Sleep History    Never diagnosed with sleep apnea in the past.  Occupational history   Occupation:  She is current working: No  Type of profession: unemployed, disabled. History of tobacco smoking:No     History of recreational or IV drug use in the past:NO     History of exposure to coal mines/coal dust: NO  History of exposure to foundry dust/welding: NO  History of exposure to quarry/silica/sandblasting: NO  History of exposure to asbestos/working with breaks/ships: NO  History of exposure to farm dust: NO  History of recent travel to long distances: NO  History of exposure to birds, pigeons, or chickens in the past:NO    History of pulmonary embolism in the past: No            History of DVT in the past:No            Vitals     height is 4' 9\" (1.448 m) and weight is 168 lb 9 oz (76.5 kg). Her axillary temperature is 98.6 °F (37 °C). Her blood pressure is 96/48 (abnormal) and her pulse is 85. Her respiration is 19 and oxygen saturation is 97%. Body mass index is 36.48 kg/m². SUPPLEMENTAL O2: O2 Flow Rate (L/min): 10 L/min     I/O        Intake/Output Summary (Last 24 hours) at 12/1/2021 1628  Last data filed at 12/1/2021 1028  Gross per 24 hour   Intake 308.62 ml   Output 1050 ml   Net -741.38 ml     I/O last 3 completed shifts:   In: 408.6 [I.V.:273.6; NG/GT:135]  Out: 1050 [Urine:50; Other:1000]   Patient Vitals for the past 96 hrs (Last 3 readings):   Weight   12/01/21 0336 168 lb 9 oz (76.5 kg)   11/30/21 0300 167 lb 8.8 oz (76 kg)   11/29/21 1555 165 lb 12.6 oz (75.2 kg)       Exam   Physical Exam   Constitutional: No distress on vent via trach. Head: normocephalic and atraumatic  Mouth/Throat: Oropharynx is clear and moist. No oral thrush. Eyes: conjunctivae are normal. Pupils are equal, round, and reactive to light. No scleral icterus  Neck: Supple. 7.5 mm Bivona portex in place  Cardiovascular: S1 and S2 with no murmur. No peripheral edema  Pulmonary/Chest: Normal effort with bilateral air entry, wheezing heard over bilateral lung fields but this is mild. No stridor. No acute respiratory distress. R-sided chest tube is in place. Abdominal: soft. Bowel sounds normoactive. No distension or tenderness to palpation. MSK: moves all extremities  Neuro: patient is alert and follows simple commands. Labs  - Old records and notes have been reviewed in [facility]   ABG  Lab Results   Component Value Date    PH 7.36 11/13/2021    PO2 59 11/13/2021    PCO2 45 11/13/2021    HCO3 26 11/13/2021    O2SAT 89 11/13/2021     Lab Results   Component Value Date    IFIO2 70 11/13/2021    MODE PC 11/05/2021    SETPEEP 8.0 11/15/2021     CBC  Recent Labs     11/29/21  0500 11/30/21  0505 12/01/21  0435   WBC 6.0 7.8 8.6   RBC 2.53* 2.67* 2.59*   HGB 7.4* 7.7* 7.6*   HCT 24.4* 25.4* 24.7*   MCV 96.4 95.1 95.4   MCH 29.2 28.8 29.3   MCHC 30.3* 30.3* 30.8*    199 229   MPV 9.1* 8.9* 8.8*      BMP  Recent Labs     11/29/21  0500 11/29/21  0500 11/30/21  0505 12/01/21  0435   *  --  133* 131*   K 3.6   < > 3.4* 4.0  4.0   CL 96*  --  98 95*   CO2 25  --  24 25   BUN 51*  --  35* 43*   CREATININE 3.5*  --  2.7* 3.3*   GLUCOSE 108  --  101 97   MG 1.8  --  1.7 1.7   CALCIUM 8.2*  --  8.5 8.4*    < > = values in this interval not displayed. LFT  No results for input(s): AST, ALT, ALB, BILITOT, ALKPHOS, LIPASE in the last 72 hours. Invalid input(s):   AMYLASE  TROP  Lab Results   Component Value Date    TROPONINT 0.084 10/28/2021    TROPONINT 0.059 10/27/2021    TROPONINT 0.037 03/06/2020     BNP  No results for input(s): BNP in the last 72 hours. Lactic Acid  No results for input(s): LACTA in the last 72 hours. INR  No results for input(s): INR, PROTIME in the last 72 hours. PTT  No results for input(s): APTT in the last 72 hours. Glucose  Recent Labs     12/01/21  0011 12/01/21  0936 12/01/21  1452   POCGLU 120* 94 116*     UA   Recent Labs     12/01/21  0850   COLORU YELLOW   . PFTs           Sleep studies   Study Results  Initial Study Date -  11/7/19  AHI -  5.8   TotalEvents - 32  (Apneas  19  Hypopneas 13  Central  0)  LM w/Arousals - 0  Sleep Efficiency - 70.8 % (Total Sleep Time - 330 min)  Time with Sats below 88% - 0 min    Cultures    -Anaerobic and aerobic culture: (From Coccyx) Positive for Proteus mirabilis and Pseudomonas aeruginosa, culture also yielded very light growth of Staphylococcus species (coagulase negative), and additional enteric gram negative bacilli  -Respiratory culture on 11/11/21 of bronchial washing:No bacteria seen. -Molecular pneumonia panel of sputum on 11/15/2021: Negative  -AFB culture with smear: negative  -pneumonia panel of bronchial washings 11/13/2021: Positive for staph aureus and resistant gene meca/c & mrej  -Body fluid culture on 11/5/2021: positive for Staph epidermidis, very light growth Isolates of Methicillin Resistant Staphylococcus coagulase negative (MRSE)  -Respiratory culture on 11/5/2021: Positive for staphylococcal aureus,  EKG     Echocardiogram   Complete 2D ECHO with doppler with color 10/27/21:  Mitral Valve   The mitral valve structure was normal with normal leaflet separation. DOPPLER: The transmitral velocity was within the normal range with no   evidence for mitral stenosis. There was no evidence of mitral   regurgitation. Aortic Valve   The aortic valve was trileaflet with normal thickness and cuspal   separation.  DOPPLER: Transaortic velocity was within the normal range with no evidence of aortic stenosis. There was no evidence of aortic   regurgitation. Tricuspid Valve   The tricuspid valve structure was normal with normal leaflet separation. DOPPLER: There was no evidence of tricuspid stenosis. There was trace   tricuspid regurgitation. Pulmonic Valve   The pulmonic valve leaflets exhibited normal thickness, no calcification,   and normal cuspal separation. DOPPLER: The transpulmonic velocity was   within the normal range with no evidence for regurgitation. Left Atrium   Left atrial size was normal.      Left Ventricle   Normal left ventricular size and systolic function. There were no regional wall motion abnormalities. Wall thickness was within normal limits. Ejection fraction was estimated at 60-65%. Right Atrium   Right atrial size was normal.      Right Ventricle   The right ventricular size was normal with normal systolic function and   wall thickness. Pericardial Effusion   The pericardium was normal in appearance with a small, non-hemodynamically   significant pericardial effusion. Prominent pericardial fat pad. Pleural Effusion   No evidence of pleural effusion. Aorta / Great Vessels   IVC size is dilated with reduced respiratory phasic changes (CVP~10-15   mmHg). Radiology    CXR  CXR 12/01/2021      CT Scans  (See actual reports for details)    CT chest without contrast on 11/1/21: There are moderate bilateral pleural effusions which have mildly increased in size in the interval. There is adjacent atelectasis. Pulmonary vessels are prominent, similar to prior exam. There are groundglass opacities adjacent to the pulmonary vessels. The heart is enlarged, similar to prior exam. There is a small pericardial effusion, decreased compared to prior exam. Redemonstration of percutaneous cardiac pacer leads and enteric tube. The endotracheal tube is stable with tip in the distal trachea.   There is marked thyromegaly which is nodular in appearance, stable compared to prior exam. Visualized upper abdominal solid organs are grossly unremarkable. There are stable degenerative changes of the thoracic spine with exaggerated thoracic kyphosis. Assessment   -Left lung collapse due to mucus plug. S/p Therapeutic bronchoscopy with removal of mucus plugs- Resolved. -Right-sided pneumothorax. Status post chest tube 11/26/2021. 12/01/2021 -Resolved  -Large left sided pleural effusion S/p thoracentesis by Dr. Cindy Grace MD from interventional renal service. -Moderate right pleural effusion-transudate pleural effusion.  -Hemoptysis due to anticoagulation I.e due to heparin drip in ICU--resolved  -Chronic hypoxic respiratory failure secondary to severe multi-organism pneumonia with bilateral pleural effusion and repeated mucous plugging  Intubated 10/27/21, extubated 11/9/21, reintubated 11/15/21, tracheostomy tube placed 11/17/21  -Severe bilateral multiorganism pneumonia: Status post therapy with antibiotics-improved   -? ANCA associated vasculitis: elevated anti MPO elevated 416 and serine proteinase 3 IgG WNL-nephrology service by Dr. Marjorie Knutson MD is following the patient  -Stage II ALVIN on CKD 3 on HD started 11/20/21  -Hydronephrosis 2/2 Left-sided staghorn calculi  -Acute blood loss anemia: PRBC x1 transfused 10/31/21, 11/4/21, 11/5/21, x2 11/6/21, x3 11/8/21, x3 11/15/21. Heparin stopped (dialysis dose last ran on 11/11/21). -Hx of BARBER noncompliant with CPAP     Plan   -Will request IR to remove chest tube on right side due to resolution of pneumothorax.   -Follow pending cultures   -SBT trials as tolerated   -Mucomyst neb BID   -Aspiration precautions   -Cautious use of tracheal suction to reduce risk of suction trauma   -Wean FiO2 as tolerated to maintain saturation above 90%. -Patient to be transferred to [place]   Questions and concerns addressed.     Attending: Dr. Arnold Posada MD    Electronically signed by   Sue Perez Noa Antoine MD on 12/1/2021 at 4:28 PM     Addendum by Dr. Casey Olmstead MD:  I have seen and examined the patient independently. Face to face evaluation and examination was performed. The above evaluation and note has been reviewed. Labs and radiographs were reviewed. I have discussed with Dr. Efrain Nunez MD about this patient in detail. The above assessment and plan has been reviewed. Please see my modifications mentioned below. My modifications:  Thoracentesis fluid analysis results. Performed on: 1 December 2021  Side: Left side of chest .  By IR: Yes  Amount of pleural fluid drained: 1 L    Lab Results   Component Value Date    PHFL 7.49 12/01/2021    COLORU YELLOW 12/01/2021    COLORU RED 11/03/2021    LDFL 93 12/01/2021    PROTEINFL 3.0 12/01/2021    TRIG 66 06/18/2019     Lab Results   Component Value Date    PROT 5.1 11/30/2021     CYTOLOGY: Pending    Serum LDH: 140  LDH ratio i.e Pleural fluid LDH/ Serum LDH: 93/140=0.66  Total protein ratio i.e Pleural fluid Total protein/ Serum Total protein: 3.0/5.1=0.58    Pleural fluid cultures were negative so far. Gram stain of pleural fluid is negative for any organisms. Chest Xray done on: Wed Dec 1, 2021  9:   PROCEDURE: XR CHEST PORTABLE   1. Mild hepatomegaly. Tracheostomy tube. Right jugular line with catheter tip at cavoatrial junction. Small caliber chest tube right side, inserted for treatment of pneumothorax. 2. A tunneled dialysis catheter has been inserted via a left subclavian route with tip in right atrium. Pigtail drainage catheter left upper quadrant of the abdomen. 3. Tiny pleural effusion right side. Mild bibasilar atelectasis/pneumonia. 4. Status post thoracentesis left side. Only a tiny residual effusion is present left side versus pleural thickening. No pneumothorax is seen following recent thoracentesis. Plan: We will request interventional radiology service to remove right-sided chest tube.   I spoke with patient sister at bedside and informed about my impression plan. We will wean patient from ventilator as tolerated with SBT trial in a.m.     Robson Johnson MD 12/1/2021 5:09 PM

## 2021-12-01 NOTE — OP NOTE
Department of Radiology  Post Procedure Progress Note      Pre-Procedure Diagnosis:  Renal failure , and pleural effusion    Procedure Performed:  Dialysis cath exchange and thoracentesis left side    Anesthesia: local / versed and fentanyl    Findings: successful, 1 L hickman serous fluid aspirated    Immediate Complications:  None    Estimated Blood Loss: minimal    SEE DICTATED PROCEDURE NOTE FOR COMPLETE DETAILS.     Leonardo Yu MD   12/1/2021 8:58 AM

## 2021-12-02 ENCOUNTER — APPOINTMENT (OUTPATIENT)
Dept: GENERAL RADIOLOGY | Age: 71
End: 2021-12-02
Attending: INTERNAL MEDICINE
Payer: COMMERCIAL

## 2021-12-02 LAB
ALBUMIN SERPL-MCNC: 2.5 G/DL (ref 3.5–5.1)
ALP BLD-CCNC: 107 U/L (ref 38–126)
ALT SERPL-CCNC: 9 U/L (ref 11–66)
ANION GAP SERPL CALCULATED.3IONS-SCNC: 13 MEQ/L (ref 8–16)
APTT: 30.6 SECONDS (ref 22–38)
AST SERPL-CCNC: 11 U/L (ref 5–40)
BASE EXCESS (CALCULATED): 0.8 MMOL/L (ref -2.5–2.5)
BILIRUB SERPL-MCNC: 0.2 MG/DL (ref 0.3–1.2)
BLOOD CULTURE, ROUTINE: NORMAL
BLOOD CULTURE, ROUTINE: NORMAL
BUN BLDV-MCNC: 56 MG/DL (ref 7–22)
CALCIUM SERPL-MCNC: 8.7 MG/DL (ref 8.5–10.5)
CHLORIDE BLD-SCNC: 99 MEQ/L (ref 98–111)
CO2: 23 MEQ/L (ref 23–33)
COLLECTED BY:: NORMAL
CREAT SERPL-MCNC: 3.9 MG/DL (ref 0.4–1.2)
ERYTHROCYTE [DISTWIDTH] IN BLOOD BY AUTOMATED COUNT: 17.1 % (ref 11.5–14.5)
ERYTHROCYTE [DISTWIDTH] IN BLOOD BY AUTOMATED COUNT: 60 FL (ref 35–45)
GFR SERPL CREATININE-BSD FRML MDRD: 11 ML/MIN/1.73M2
GLUCOSE BLD-MCNC: 97 MG/DL (ref 70–108)
HCO3: 26 MMOL/L (ref 23–28)
HCT VFR BLD CALC: 25.7 % (ref 37–47)
HEMOGLOBIN: 7.7 GM/DL (ref 12–16)
INR BLD: 1.1 (ref 0.85–1.13)
MCH RBC QN AUTO: 28.9 PG (ref 26–33)
MCHC RBC AUTO-ENTMCNC: 30 GM/DL (ref 32.2–35.5)
MCV RBC AUTO: 96.6 FL (ref 81–99)
O2 SATURATION: 97 %
PCO2: 43 MMHG (ref 35–45)
PH BLOOD GAS: 7.39 (ref 7.35–7.45)
PLATELET # BLD: 217 THOU/MM3 (ref 130–400)
PMV BLD AUTO: 8.9 FL (ref 9.4–12.4)
PO2: 95 MMHG (ref 71–104)
POTASSIUM SERPL-SCNC: 3.7 MEQ/L (ref 3.5–5.2)
PREALBUMIN: 9.5 MG/DL (ref 20–40)
RBC # BLD: 2.66 MILL/MM3 (ref 4.2–5.4)
SODIUM BLD-SCNC: 135 MEQ/L (ref 135–145)
TOTAL PROTEIN: 4.3 G/DL (ref 6.1–8)
WBC # BLD: 11.2 THOU/MM3 (ref 4.8–10.8)

## 2021-12-02 PROCEDURE — 71045 X-RAY EXAM CHEST 1 VIEW: CPT

## 2021-12-03 LAB
ANION GAP SERPL CALCULATED.3IONS-SCNC: 11 MEQ/L (ref 8–16)
BUN BLDV-MCNC: 49 MG/DL (ref 7–22)
CALCIUM SERPL-MCNC: 8.3 MG/DL (ref 8.5–10.5)
CHLORIDE BLD-SCNC: 100 MEQ/L (ref 98–111)
CO2: 26 MEQ/L (ref 23–33)
CREAT SERPL-MCNC: 3.4 MG/DL (ref 0.4–1.2)
GFR SERPL CREATININE-BSD FRML MDRD: 13 ML/MIN/1.73M2
GLUCOSE BLD-MCNC: 126 MG/DL (ref 70–108)
GRAM STAIN RESULT: ABNORMAL
GRAM STAIN RESULT: ABNORMAL
MISC. #1 REFERENCE GROUP TEST: NORMAL
ORGANISM: ABNORMAL
ORGANISM: ABNORMAL
POTASSIUM SERPL-SCNC: 3.4 MEQ/L (ref 3.5–5.2)
RESPIRATORY CULTURE: ABNORMAL
SODIUM BLD-SCNC: 137 MEQ/L (ref 135–145)

## 2021-12-04 ENCOUNTER — APPOINTMENT (OUTPATIENT)
Dept: GENERAL RADIOLOGY | Age: 71
End: 2021-12-04
Attending: INTERNAL MEDICINE
Payer: COMMERCIAL

## 2021-12-04 LAB
ALBUMIN SERPL-MCNC: 2.3 G/DL (ref 3.5–5.1)
ALP BLD-CCNC: 112 U/L (ref 38–126)
ALT SERPL-CCNC: 14 U/L (ref 11–66)
ANION GAP SERPL CALCULATED.3IONS-SCNC: 10 MEQ/L (ref 8–16)
AST SERPL-CCNC: 19 U/L (ref 5–40)
BASOPHILS # BLD: 0.6 %
BASOPHILS ABSOLUTE: 0.1 THOU/MM3 (ref 0–0.1)
BILIRUB SERPL-MCNC: < 0.2 MG/DL (ref 0.3–1.2)
BUN BLDV-MCNC: 37 MG/DL (ref 7–22)
CALCIUM SERPL-MCNC: 8.5 MG/DL (ref 8.5–10.5)
CHLORIDE BLD-SCNC: 103 MEQ/L (ref 98–111)
CO2: 27 MEQ/L (ref 23–33)
CREAT SERPL-MCNC: 2.8 MG/DL (ref 0.4–1.2)
EOSINOPHIL # BLD: 1.6 %
EOSINOPHILS ABSOLUTE: 0.2 THOU/MM3 (ref 0–0.4)
ERYTHROCYTE [DISTWIDTH] IN BLOOD BY AUTOMATED COUNT: 17.1 % (ref 11.5–14.5)
ERYTHROCYTE [DISTWIDTH] IN BLOOD BY AUTOMATED COUNT: 60.2 FL (ref 35–45)
GFR SERPL CREATININE-BSD FRML MDRD: 17 ML/MIN/1.73M2
GLUCOSE BLD-MCNC: 121 MG/DL (ref 70–108)
HCT VFR BLD CALC: 23.9 % (ref 37–47)
HEMOGLOBIN: 7.4 GM/DL (ref 12–16)
IMMATURE GRANS (ABS): 0.23 THOU/MM3 (ref 0–0.07)
IMMATURE GRANULOCYTES: 1.8 %
LYMPHOCYTES # BLD: 6.9 %
LYMPHOCYTES ABSOLUTE: 0.9 THOU/MM3 (ref 1–4.8)
MCH RBC QN AUTO: 30.1 PG (ref 26–33)
MCHC RBC AUTO-ENTMCNC: 31 GM/DL (ref 32.2–35.5)
MCV RBC AUTO: 97.2 FL (ref 81–99)
MONOCYTES # BLD: 9.9 %
MONOCYTES ABSOLUTE: 1.3 THOU/MM3 (ref 0.4–1.3)
NUCLEATED RED BLOOD CELLS: 0 /100 WBC
PLATELET # BLD: 170 THOU/MM3 (ref 130–400)
PMV BLD AUTO: 8.8 FL (ref 9.4–12.4)
POTASSIUM SERPL-SCNC: 3.6 MEQ/L (ref 3.5–5.2)
RBC # BLD: 2.46 MILL/MM3 (ref 4.2–5.4)
SEG NEUTROPHILS: 79.2 %
SEGMENTED NEUTROPHILS ABSOLUTE COUNT: 10.3 THOU/MM3 (ref 1.8–7.7)
SODIUM BLD-SCNC: 140 MEQ/L (ref 135–145)
TOTAL PROTEIN: 4.3 G/DL (ref 6.1–8)
WBC # BLD: 13 THOU/MM3 (ref 4.8–10.8)

## 2021-12-04 PROCEDURE — 71045 X-RAY EXAM CHEST 1 VIEW: CPT

## 2021-12-05 ENCOUNTER — TELEPHONE (OUTPATIENT)
Dept: FAMILY MEDICINE CLINIC | Age: 71
End: 2021-12-05

## 2021-12-05 LAB
ALBUMIN SERPL-MCNC: 2.2 G/DL (ref 3.5–5.1)
ALP BLD-CCNC: 118 U/L (ref 38–126)
ALT SERPL-CCNC: 20 U/L (ref 11–66)
ANION GAP SERPL CALCULATED.3IONS-SCNC: 13 MEQ/L (ref 8–16)
AST SERPL-CCNC: 30 U/L (ref 5–40)
BASOPHILS # BLD: 0.9 %
BASOPHILS ABSOLUTE: 0.1 THOU/MM3 (ref 0–0.1)
BILIRUB SERPL-MCNC: < 0.2 MG/DL (ref 0.3–1.2)
BUN BLDV-MCNC: 45 MG/DL (ref 7–22)
CALCIUM SERPL-MCNC: 8.6 MG/DL (ref 8.5–10.5)
CHLORIDE BLD-SCNC: 98 MEQ/L (ref 98–111)
CO2: 23 MEQ/L (ref 23–33)
CREAT SERPL-MCNC: 3.1 MG/DL (ref 0.4–1.2)
EOSINOPHIL # BLD: 2.9 %
EOSINOPHILS ABSOLUTE: 0.4 THOU/MM3 (ref 0–0.4)
ERYTHROCYTE [DISTWIDTH] IN BLOOD BY AUTOMATED COUNT: 17.2 % (ref 11.5–14.5)
ERYTHROCYTE [DISTWIDTH] IN BLOOD BY AUTOMATED COUNT: 61.8 FL (ref 35–45)
GFR SERPL CREATININE-BSD FRML MDRD: 15 ML/MIN/1.73M2
GLUCOSE BLD-MCNC: 110 MG/DL (ref 70–108)
HCT VFR BLD CALC: 25.8 % (ref 37–47)
HEMOGLOBIN: 7.8 GM/DL (ref 12–16)
IMMATURE GRANS (ABS): 0.22 THOU/MM3 (ref 0–0.07)
IMMATURE GRANULOCYTES: 1.8 %
LYMPHOCYTES # BLD: 8.6 %
LYMPHOCYTES ABSOLUTE: 1.1 THOU/MM3 (ref 1–4.8)
MCH RBC QN AUTO: 29.9 PG (ref 26–33)
MCHC RBC AUTO-ENTMCNC: 30.2 GM/DL (ref 32.2–35.5)
MCV RBC AUTO: 98.9 FL (ref 81–99)
MONOCYTES # BLD: 7.8 %
MONOCYTES ABSOLUTE: 1 THOU/MM3 (ref 0.4–1.3)
NUCLEATED RED BLOOD CELLS: 0 /100 WBC
PLATELET # BLD: 192 THOU/MM3 (ref 130–400)
PMV BLD AUTO: 9 FL (ref 9.4–12.4)
POTASSIUM SERPL-SCNC: 3.5 MEQ/L (ref 3.5–5.2)
RBC # BLD: 2.61 MILL/MM3 (ref 4.2–5.4)
SEG NEUTROPHILS: 78 %
SEGMENTED NEUTROPHILS ABSOLUTE COUNT: 9.6 THOU/MM3 (ref 1.8–7.7)
SODIUM BLD-SCNC: 134 MEQ/L (ref 135–145)
TOTAL PROTEIN: 4.7 G/DL (ref 6.1–8)
WBC # BLD: 12.3 THOU/MM3 (ref 4.8–10.8)

## 2021-12-05 NOTE — TELEPHONE ENCOUNTER
----- Message from [place] Kauffman sent at 12/3/2021  1:27 PM EST -----  Subject: Message to Provider    QUESTIONS  Information for Provider? Julieta Keith from [facility] called to inform   Dr. Giovanna Christianson that the pt has been admitted since 12/01 for respiratory   failure. States if there are any questions to call [phone]. Thank you  ---------------------------------------------------------------------------  --------------  CALL BACK INFO  What is the best way for the office to contact you? Do not leave any   message, patient will call back for answer  Preferred Call Back Phone Number? [phone]  ---------------------------------------------------------------------------  --------------  SCRIPT ANSWERS  Relationship to Patient? Third Party  Representative Name?  Julieta Keith

## 2021-12-06 LAB
ALBUMIN SERPL-MCNC: 2.4 G/DL (ref 3.5–5.1)
ALBUMIN SERPL-MCNC: 2.4 G/DL (ref 3.5–5.1)
ALP BLD-CCNC: 127 U/L (ref 38–126)
ALT SERPL-CCNC: 30 U/L (ref 11–66)
ANAEROBIC CULTURE: NORMAL
ANION GAP SERPL CALCULATED.3IONS-SCNC: 12 MEQ/L (ref 8–16)
AST SERPL-CCNC: 39 U/L (ref 5–40)
BASOPHILS # BLD: 0.8 %
BASOPHILS ABSOLUTE: 0.1 THOU/MM3 (ref 0–0.1)
BILIRUB SERPL-MCNC: < 0.2 MG/DL (ref 0.3–1.2)
BODY FLUID CULTURE, STERILE: NORMAL
BUN BLDV-MCNC: 55 MG/DL (ref 7–22)
CALCIUM SERPL-MCNC: 8.9 MG/DL (ref 8.5–10.5)
CHLORIDE BLD-SCNC: 96 MEQ/L (ref 98–111)
CO2: 26 MEQ/L (ref 23–33)
CREAT SERPL-MCNC: 3.6 MG/DL (ref 0.4–1.2)
EOSINOPHIL # BLD: 1.8 %
EOSINOPHILS ABSOLUTE: 0.3 THOU/MM3 (ref 0–0.4)
ERYTHROCYTE [DISTWIDTH] IN BLOOD BY AUTOMATED COUNT: 17.2 % (ref 11.5–14.5)
ERYTHROCYTE [DISTWIDTH] IN BLOOD BY AUTOMATED COUNT: 61.2 FL (ref 35–45)
GFR SERPL CREATININE-BSD FRML MDRD: 13 ML/MIN/1.73M2
GLUCOSE BLD-MCNC: 116 MG/DL (ref 70–108)
GRAM STAIN RESULT: NORMAL
HCT VFR BLD CALC: 26.3 % (ref 37–47)
HEMOGLOBIN: 7.7 GM/DL (ref 12–16)
IMMATURE GRANS (ABS): 0.24 THOU/MM3 (ref 0–0.07)
IMMATURE GRANULOCYTES: 1.7 %
LYMPHOCYTES # BLD: 7.3 %
LYMPHOCYTES ABSOLUTE: 1 THOU/MM3 (ref 1–4.8)
MCH RBC QN AUTO: 28.6 PG (ref 26–33)
MCHC RBC AUTO-ENTMCNC: 29.3 GM/DL (ref 32.2–35.5)
MCV RBC AUTO: 97.8 FL (ref 81–99)
MONOCYTES # BLD: 8.3 %
MONOCYTES ABSOLUTE: 1.2 THOU/MM3 (ref 0.4–1.3)
NUCLEATED RED BLOOD CELLS: 0 /100 WBC
PLATELET # BLD: 208 THOU/MM3 (ref 130–400)
PMV BLD AUTO: 9.1 FL (ref 9.4–12.4)
POTASSIUM SERPL-SCNC: 3.5 MEQ/L (ref 3.5–5.2)
PREALBUMIN: 9.6 MG/DL (ref 20–40)
RBC # BLD: 2.69 MILL/MM3 (ref 4.2–5.4)
SEG NEUTROPHILS: 80.1 %
SEGMENTED NEUTROPHILS ABSOLUTE COUNT: 11.4 THOU/MM3 (ref 1.8–7.7)
SODIUM BLD-SCNC: 134 MEQ/L (ref 135–145)
TOTAL PROTEIN: 4.4 G/DL (ref 6.1–8)
WBC # BLD: 14.2 THOU/MM3 (ref 4.8–10.8)

## 2021-12-07 ENCOUNTER — APPOINTMENT (OUTPATIENT)
Dept: GENERAL RADIOLOGY | Age: 71
End: 2021-12-07
Attending: INTERNAL MEDICINE
Payer: COMMERCIAL

## 2021-12-07 LAB
ANION GAP SERPL CALCULATED.3IONS-SCNC: 9 MEQ/L (ref 8–16)
BUN BLDV-MCNC: 33 MG/DL (ref 7–22)
CALCIUM SERPL-MCNC: 8.8 MG/DL (ref 8.5–10.5)
CHLORIDE BLD-SCNC: 100 MEQ/L (ref 98–111)
CO2: 28 MEQ/L (ref 23–33)
CREAT SERPL-MCNC: 2.2 MG/DL (ref 0.4–1.2)
GFR SERPL CREATININE-BSD FRML MDRD: 22 ML/MIN/1.73M2
GLUCOSE BLD-MCNC: 124 MG/DL (ref 70–108)
POTASSIUM SERPL-SCNC: 3.5 MEQ/L (ref 3.5–5.2)
SODIUM BLD-SCNC: 137 MEQ/L (ref 135–145)
VIRAL CULTURE,RAPID,RESPIRATOR: NORMAL

## 2021-12-07 PROCEDURE — 71045 X-RAY EXAM CHEST 1 VIEW: CPT

## 2021-12-08 LAB
ANION GAP SERPL CALCULATED.3IONS-SCNC: 12 MEQ/L (ref 8–16)
BUN BLDV-MCNC: 46 MG/DL (ref 7–22)
CALCIUM SERPL-MCNC: 8.8 MG/DL (ref 8.5–10.5)
CHLORIDE BLD-SCNC: 103 MEQ/L (ref 98–111)
CO2: 25 MEQ/L (ref 23–33)
CREAT SERPL-MCNC: 2.6 MG/DL (ref 0.4–1.2)
GFR SERPL CREATININE-BSD FRML MDRD: 18 ML/MIN/1.73M2
GLUCOSE BLD-MCNC: 129 MG/DL (ref 70–108)
POTASSIUM SERPL-SCNC: 3.7 MEQ/L (ref 3.5–5.2)
SODIUM BLD-SCNC: 140 MEQ/L (ref 135–145)

## 2021-12-09 LAB
ANION GAP SERPL CALCULATED.3IONS-SCNC: 11 MEQ/L (ref 8–16)
BUN BLDV-MCNC: 57 MG/DL (ref 7–22)
CALCIUM SERPL-MCNC: 9 MG/DL (ref 8.5–10.5)
CHLORIDE BLD-SCNC: 99 MEQ/L (ref 98–111)
CO2: 26 MEQ/L (ref 23–33)
CREAT SERPL-MCNC: 2.8 MG/DL (ref 0.4–1.2)
FUNGUS SMEAR: ABNORMAL
GFR SERPL CREATININE-BSD FRML MDRD: 17 ML/MIN/1.73M2
GLUCOSE BLD-MCNC: 123 MG/DL (ref 70–108)
ORGANISM: ABNORMAL
POTASSIUM SERPL-SCNC: 3.3 MEQ/L (ref 3.5–5.2)
SODIUM BLD-SCNC: 136 MEQ/L (ref 135–145)

## 2021-12-10 LAB
ANION GAP SERPL CALCULATED.3IONS-SCNC: 11 MEQ/L (ref 8–16)
BUN BLDV-MCNC: 66 MG/DL (ref 7–22)
CALCIUM SERPL-MCNC: 8.8 MG/DL (ref 8.5–10.5)
CHLORIDE BLD-SCNC: 98 MEQ/L (ref 98–111)
CO2: 28 MEQ/L (ref 23–33)
CREAT SERPL-MCNC: 3.3 MG/DL (ref 0.4–1.2)
GFR SERPL CREATININE-BSD FRML MDRD: 14 ML/MIN/1.73M2
GLUCOSE BLD-MCNC: 96 MG/DL (ref 70–108)
POTASSIUM SERPL-SCNC: 3.4 MEQ/L (ref 3.5–5.2)
SODIUM BLD-SCNC: 137 MEQ/L (ref 135–145)

## 2021-12-11 LAB
ANION GAP SERPL CALCULATED.3IONS-SCNC: 13 MEQ/L (ref 8–16)
BUN BLDV-MCNC: 73 MG/DL (ref 7–22)
CALCIUM SERPL-MCNC: 9 MG/DL (ref 8.5–10.5)
CHLORIDE BLD-SCNC: 100 MEQ/L (ref 98–111)
CO2: 24 MEQ/L (ref 23–33)
CREAT SERPL-MCNC: 3.3 MG/DL (ref 0.4–1.2)
GFR SERPL CREATININE-BSD FRML MDRD: 14 ML/MIN/1.73M2
GLUCOSE BLD-MCNC: 116 MG/DL (ref 70–108)
POTASSIUM SERPL-SCNC: 3.8 MEQ/L (ref 3.5–5.2)
SODIUM BLD-SCNC: 137 MEQ/L (ref 135–145)

## 2021-12-12 LAB
ANION GAP SERPL CALCULATED.3IONS-SCNC: 12 MEQ/L (ref 8–16)
BUN BLDV-MCNC: 81 MG/DL (ref 7–22)
CALCIUM SERPL-MCNC: 9.1 MG/DL (ref 8.5–10.5)
CHLORIDE BLD-SCNC: 102 MEQ/L (ref 98–111)
CO2: 25 MEQ/L (ref 23–33)
CREAT SERPL-MCNC: 3.6 MG/DL (ref 0.4–1.2)
GFR SERPL CREATININE-BSD FRML MDRD: 13 ML/MIN/1.73M2
GLUCOSE BLD-MCNC: 109 MG/DL (ref 70–108)
POTASSIUM SERPL-SCNC: 4.6 MEQ/L (ref 3.5–5.2)
SODIUM BLD-SCNC: 139 MEQ/L (ref 135–145)

## 2021-12-13 LAB
AFB CULTURE & SMEAR: NORMAL
ANION GAP SERPL CALCULATED.3IONS-SCNC: 14 MEQ/L (ref 8–16)
BUN BLDV-MCNC: 82 MG/DL (ref 7–22)
CALCIUM SERPL-MCNC: 9.3 MG/DL (ref 8.5–10.5)
CHLORIDE BLD-SCNC: 101 MEQ/L (ref 98–111)
CO2: 25 MEQ/L (ref 23–33)
CREAT SERPL-MCNC: 3.5 MG/DL (ref 0.4–1.2)
GFR SERPL CREATININE-BSD FRML MDRD: 13 ML/MIN/1.73M2
GLUCOSE BLD-MCNC: 114 MG/DL (ref 70–108)
MAGNESIUM: 1.7 MG/DL (ref 1.6–2.4)
PHOSPHORUS: 4.2 MG/DL (ref 2.4–4.7)
POTASSIUM SERPL-SCNC: 5.3 MEQ/L (ref 3.5–5.2)
SODIUM BLD-SCNC: 140 MEQ/L (ref 135–145)

## 2021-12-14 LAB
ALLEN TEST: POSITIVE
ANION GAP SERPL CALCULATED.3IONS-SCNC: 17 MEQ/L (ref 8–16)
BASE EXCESS (CALCULATED): 1.8 MMOL/L (ref -2.5–2.5)
BASOPHILS # BLD: 0.7 %
BASOPHILS ABSOLUTE: 0.1 THOU/MM3 (ref 0–0.1)
BUN BLDV-MCNC: 94 MG/DL (ref 7–22)
CALCIUM SERPL-MCNC: 9.7 MG/DL (ref 8.5–10.5)
CHLORIDE BLD-SCNC: 101 MEQ/L (ref 98–111)
CO2: 23 MEQ/L (ref 23–33)
COLLECTED BY:: ABNORMAL
CREAT SERPL-MCNC: 3.4 MG/DL (ref 0.4–1.2)
DEVICE: ABNORMAL
EOSINOPHIL # BLD: 4.3 %
EOSINOPHILS ABSOLUTE: 0.6 THOU/MM3 (ref 0–0.4)
ERYTHROCYTE [DISTWIDTH] IN BLOOD BY AUTOMATED COUNT: 18.1 % (ref 11.5–14.5)
ERYTHROCYTE [DISTWIDTH] IN BLOOD BY AUTOMATED COUNT: 64.3 FL (ref 35–45)
GFR SERPL CREATININE-BSD FRML MDRD: 13 ML/MIN/1.73M2
GLUCOSE BLD-MCNC: 117 MG/DL (ref 70–108)
HCO3: 27 MMOL/L (ref 23–28)
HCT VFR BLD CALC: 24.3 % (ref 37–47)
HEMOGLOBIN: 7.4 GM/DL (ref 12–16)
IFIO2: 28
IMMATURE GRANS (ABS): 0.22 THOU/MM3 (ref 0–0.07)
IMMATURE GRANULOCYTES: 1.6 %
LYMPHOCYTES # BLD: 7.8 %
LYMPHOCYTES ABSOLUTE: 1.1 THOU/MM3 (ref 1–4.8)
MCH RBC QN AUTO: 30.2 PG (ref 26–33)
MCHC RBC AUTO-ENTMCNC: 30.5 GM/DL (ref 32.2–35.5)
MCV RBC AUTO: 99.2 FL (ref 81–99)
MONOCYTES # BLD: 6.6 %
MONOCYTES ABSOLUTE: 0.9 THOU/MM3 (ref 0.4–1.3)
NUCLEATED RED BLOOD CELLS: 0 /100 WBC
O2 SATURATION: 90 %
PCO2: 44 MMHG (ref 35–45)
PH BLOOD GAS: 7.4 (ref 7.35–7.45)
PLATELET # BLD: 344 THOU/MM3 (ref 130–400)
PMV BLD AUTO: 9 FL (ref 9.4–12.4)
PO2: 59 MMHG (ref 71–104)
POTASSIUM SERPL-SCNC: 5.2 MEQ/L (ref 3.5–5.2)
RBC # BLD: 2.45 MILL/MM3 (ref 4.2–5.4)
SEG NEUTROPHILS: 79 %
SEGMENTED NEUTROPHILS ABSOLUTE COUNT: 11.1 THOU/MM3 (ref 1.8–7.7)
SODIUM BLD-SCNC: 141 MEQ/L (ref 135–145)
SOURCE, BLOOD GAS: ABNORMAL
WBC # BLD: 14.1 THOU/MM3 (ref 4.8–10.8)

## 2021-12-15 LAB
ANION GAP SERPL CALCULATED.3IONS-SCNC: 16 MEQ/L (ref 8–16)
BASOPHILS # BLD: 0.9 %
BASOPHILS ABSOLUTE: 0.1 THOU/MM3 (ref 0–0.1)
BUN BLDV-MCNC: 98 MG/DL (ref 7–22)
CALCIUM SERPL-MCNC: 9.7 MG/DL (ref 8.5–10.5)
CHLORIDE BLD-SCNC: 100 MEQ/L (ref 98–111)
CO2: 24 MEQ/L (ref 23–33)
CREAT SERPL-MCNC: 3.4 MG/DL (ref 0.4–1.2)
EOSINOPHIL # BLD: 6.7 %
EOSINOPHILS ABSOLUTE: 0.8 THOU/MM3 (ref 0–0.4)
ERYTHROCYTE [DISTWIDTH] IN BLOOD BY AUTOMATED COUNT: 18.1 % (ref 11.5–14.5)
ERYTHROCYTE [DISTWIDTH] IN BLOOD BY AUTOMATED COUNT: 64.9 FL (ref 35–45)
GFR SERPL CREATININE-BSD FRML MDRD: 13 ML/MIN/1.73M2
GLUCOSE BLD-MCNC: 108 MG/DL (ref 70–108)
HCT VFR BLD CALC: 24.3 % (ref 37–47)
HEMOGLOBIN: 7.4 GM/DL (ref 12–16)
IMMATURE GRANS (ABS): 0.25 THOU/MM3 (ref 0–0.07)
IMMATURE GRANULOCYTES: 2 %
LYMPHOCYTES # BLD: 9.2 %
LYMPHOCYTES ABSOLUTE: 1.1 THOU/MM3 (ref 1–4.8)
MCH RBC QN AUTO: 30.2 PG (ref 26–33)
MCHC RBC AUTO-ENTMCNC: 30.5 GM/DL (ref 32.2–35.5)
MCV RBC AUTO: 99.2 FL (ref 81–99)
MONOCYTES # BLD: 6.7 %
MONOCYTES ABSOLUTE: 0.8 THOU/MM3 (ref 0.4–1.3)
NUCLEATED RED BLOOD CELLS: 0 /100 WBC
PLATELET # BLD: 332 THOU/MM3 (ref 130–400)
PMV BLD AUTO: 8.5 FL (ref 9.4–12.4)
POTASSIUM SERPL-SCNC: 5.5 MEQ/L (ref 3.5–5.2)
RBC # BLD: 2.45 MILL/MM3 (ref 4.2–5.4)
SEG NEUTROPHILS: 74.5 %
SEGMENTED NEUTROPHILS ABSOLUTE COUNT: 9.1 THOU/MM3 (ref 1.8–7.7)
SODIUM BLD-SCNC: 140 MEQ/L (ref 135–145)
WBC # BLD: 12.2 THOU/MM3 (ref 4.8–10.8)

## 2021-12-16 LAB
ANION GAP SERPL CALCULATED.3IONS-SCNC: 13 MEQ/L (ref 8–16)
ANISOCYTOSIS: PRESENT
BASOPHILS # BLD: 0.7 %
BASOPHILS ABSOLUTE: 0.1 THOU/MM3 (ref 0–0.1)
BUN BLDV-MCNC: 48 MG/DL (ref 7–22)
CALCIUM SERPL-MCNC: 9.2 MG/DL (ref 8.5–10.5)
CHLORIDE BLD-SCNC: 106 MEQ/L (ref 98–111)
CO2: 25 MEQ/L (ref 23–33)
CREAT SERPL-MCNC: 1.9 MG/DL (ref 0.4–1.2)
EOSINOPHIL # BLD: 4.1 %
EOSINOPHILS ABSOLUTE: 0.5 THOU/MM3 (ref 0–0.4)
ERYTHROCYTE [DISTWIDTH] IN BLOOD BY AUTOMATED COUNT: 18.6 % (ref 11.5–14.5)
ERYTHROCYTE [DISTWIDTH] IN BLOOD BY AUTOMATED COUNT: 67.6 FL (ref 35–45)
GFR SERPL CREATININE-BSD FRML MDRD: 26 ML/MIN/1.73M2
GLUCOSE BLD-MCNC: 110 MG/DL (ref 70–108)
HCT VFR BLD CALC: 25 % (ref 37–47)
HEMOGLOBIN: 7.3 GM/DL (ref 12–16)
IMMATURE GRANS (ABS): 0.23 THOU/MM3 (ref 0–0.07)
IMMATURE GRANULOCYTES: 1.9 %
LYMPHOCYTES # BLD: 8.9 %
LYMPHOCYTES ABSOLUTE: 1.1 THOU/MM3 (ref 1–4.8)
MCH RBC QN AUTO: 29.6 PG (ref 26–33)
MCHC RBC AUTO-ENTMCNC: 29.2 GM/DL (ref 32.2–35.5)
MCV RBC AUTO: 101.2 FL (ref 81–99)
MONOCYTES # BLD: 6.7 %
MONOCYTES ABSOLUTE: 0.8 THOU/MM3 (ref 0.4–1.3)
NUCLEATED RED BLOOD CELLS: 0 /100 WBC
PLATELET # BLD: 320 THOU/MM3 (ref 130–400)
PMV BLD AUTO: 9.1 FL (ref 9.4–12.4)
POTASSIUM SERPL-SCNC: 4.4 MEQ/L (ref 3.5–5.2)
RBC # BLD: 2.47 MILL/MM3 (ref 4.2–5.4)
SEG NEUTROPHILS: 77.7 %
SEGMENTED NEUTROPHILS ABSOLUTE COUNT: 9.6 THOU/MM3 (ref 1.8–7.7)
SODIUM BLD-SCNC: 144 MEQ/L (ref 135–145)
WBC # BLD: 12.3 THOU/MM3 (ref 4.8–10.8)

## 2021-12-17 ENCOUNTER — APPOINTMENT (OUTPATIENT)
Dept: GENERAL RADIOLOGY | Age: 71
End: 2021-12-17
Attending: INTERNAL MEDICINE
Payer: COMMERCIAL

## 2021-12-17 ENCOUNTER — TELEPHONE (OUTPATIENT)
Dept: UROLOGY | Age: 71
End: 2021-12-17

## 2021-12-17 LAB
ANION GAP SERPL CALCULATED.3IONS-SCNC: 12 MEQ/L (ref 8–16)
BUN BLDV-MCNC: 62 MG/DL (ref 7–22)
CALCIUM SERPL-MCNC: 9.8 MG/DL (ref 8.5–10.5)
CHLORIDE BLD-SCNC: 105 MEQ/L (ref 98–111)
CO2: 25 MEQ/L (ref 23–33)
CREAT SERPL-MCNC: 2.3 MG/DL (ref 0.4–1.2)
GFR SERPL CREATININE-BSD FRML MDRD: 21 ML/MIN/1.73M2
GLUCOSE BLD-MCNC: 99 MG/DL (ref 70–108)
POTASSIUM SERPL-SCNC: 4.6 MEQ/L (ref 3.5–5.2)
SODIUM BLD-SCNC: 142 MEQ/L (ref 135–145)

## 2021-12-17 PROCEDURE — 71045 X-RAY EXAM CHEST 1 VIEW: CPT

## 2021-12-17 NOTE — TELEPHONE ENCOUNTER
[facility] anticipated discharge 12/27/21  Left staghorn, she has left neph  She will need scheduled for stone treatment    Please call Jean Claude Clark, manager at [employer] [facility], [phone] to schedule treatment with Dr Naun Marquez

## 2021-12-18 LAB
ANION GAP SERPL CALCULATED.3IONS-SCNC: 14 MEQ/L (ref 8–16)
ANISOCYTOSIS: PRESENT
BASOPHILS # BLD: 0.8 %
BASOPHILS ABSOLUTE: 0.1 THOU/MM3 (ref 0–0.1)
BUN BLDV-MCNC: 74 MG/DL (ref 7–22)
CALCIUM SERPL-MCNC: 10.1 MG/DL (ref 8.5–10.5)
CHLORIDE BLD-SCNC: 104 MEQ/L (ref 98–111)
CO2: 24 MEQ/L (ref 23–33)
CREAT SERPL-MCNC: 2.6 MG/DL (ref 0.4–1.2)
EOSINOPHIL # BLD: 5.5 %
EOSINOPHILS ABSOLUTE: 0.7 THOU/MM3 (ref 0–0.4)
ERYTHROCYTE [DISTWIDTH] IN BLOOD BY AUTOMATED COUNT: 18.8 % (ref 11.5–14.5)
ERYTHROCYTE [DISTWIDTH] IN BLOOD BY AUTOMATED COUNT: 69.6 FL (ref 35–45)
GFR SERPL CREATININE-BSD FRML MDRD: 18 ML/MIN/1.73M2
GLUCOSE BLD-MCNC: 120 MG/DL (ref 70–108)
HCT VFR BLD CALC: 25.6 % (ref 37–47)
HEMOGLOBIN: 7.4 GM/DL (ref 12–16)
HYPOCHROMIA: PRESENT
IMMATURE GRANS (ABS): 0.17 THOU/MM3 (ref 0–0.07)
IMMATURE GRANULOCYTES: 1.3 %
LYMPHOCYTES # BLD: 10.6 %
LYMPHOCYTES ABSOLUTE: 1.3 THOU/MM3 (ref 1–4.8)
MCH RBC QN AUTO: 29.7 PG (ref 26–33)
MCHC RBC AUTO-ENTMCNC: 28.9 GM/DL (ref 32.2–35.5)
MCV RBC AUTO: 102.8 FL (ref 81–99)
MONOCYTES # BLD: 6.9 %
MONOCYTES ABSOLUTE: 0.9 THOU/MM3 (ref 0.4–1.3)
NUCLEATED RED BLOOD CELLS: 0 /100 WBC
PLATELET # BLD: 322 THOU/MM3 (ref 130–400)
PMV BLD AUTO: 9.1 FL (ref 9.4–12.4)
POTASSIUM SERPL-SCNC: 4.7 MEQ/L (ref 3.5–5.2)
RBC # BLD: 2.49 MILL/MM3 (ref 4.2–5.4)
SCAN OF BLOOD SMEAR: NORMAL
SEG NEUTROPHILS: 74.9 %
SEGMENTED NEUTROPHILS ABSOLUTE COUNT: 9.4 THOU/MM3 (ref 1.8–7.7)
SODIUM BLD-SCNC: 142 MEQ/L (ref 135–145)
TOXIC GRANULATION: PRESENT
WBC # BLD: 12.6 THOU/MM3 (ref 4.8–10.8)

## 2021-12-19 LAB
ANION GAP SERPL CALCULATED.3IONS-SCNC: 14 MEQ/L (ref 8–16)
BUN BLDV-MCNC: 85 MG/DL (ref 7–22)
CALCIUM SERPL-MCNC: 10.1 MG/DL (ref 8.5–10.5)
CHLORIDE BLD-SCNC: 105 MEQ/L (ref 98–111)
CO2: 24 MEQ/L (ref 23–33)
CREAT SERPL-MCNC: 2.7 MG/DL (ref 0.4–1.2)
GFR SERPL CREATININE-BSD FRML MDRD: 17 ML/MIN/1.73M2
GLUCOSE BLD-MCNC: 116 MG/DL (ref 70–108)
POTASSIUM SERPL-SCNC: 4.7 MEQ/L (ref 3.5–5.2)
SODIUM BLD-SCNC: 143 MEQ/L (ref 135–145)

## 2021-12-20 LAB
ANION GAP SERPL CALCULATED.3IONS-SCNC: 18 MEQ/L (ref 8–16)
BUN BLDV-MCNC: 94 MG/DL (ref 7–22)
CALCIUM SERPL-MCNC: 10 MG/DL (ref 8.5–10.5)
CHLORIDE BLD-SCNC: 104 MEQ/L (ref 98–111)
CO2: 21 MEQ/L (ref 23–33)
CREAT SERPL-MCNC: 2.7 MG/DL (ref 0.4–1.2)
GFR SERPL CREATININE-BSD FRML MDRD: 17 ML/MIN/1.73M2
GLUCOSE BLD-MCNC: 128 MG/DL (ref 70–108)
POTASSIUM SERPL-SCNC: 5 MEQ/L (ref 3.5–5.2)
SODIUM BLD-SCNC: 143 MEQ/L (ref 135–145)

## 2021-12-21 ENCOUNTER — APPOINTMENT (OUTPATIENT)
Dept: GENERAL RADIOLOGY | Age: 71
End: 2021-12-21
Attending: INTERNAL MEDICINE
Payer: COMMERCIAL

## 2021-12-21 LAB
ANION GAP SERPL CALCULATED.3IONS-SCNC: 18 MEQ/L (ref 8–16)
ANISOCYTOSIS: PRESENT
BASOPHILS # BLD: 0.8 %
BASOPHILS ABSOLUTE: 0.1 THOU/MM3 (ref 0–0.1)
BUN BLDV-MCNC: 105 MG/DL (ref 7–22)
CALCIUM SERPL-MCNC: 10.2 MG/DL (ref 8.5–10.5)
CHLORIDE BLD-SCNC: 109 MEQ/L (ref 98–111)
CO2: 21 MEQ/L (ref 23–33)
CREAT SERPL-MCNC: 3 MG/DL (ref 0.4–1.2)
EOSINOPHIL # BLD: 5.4 %
EOSINOPHILS ABSOLUTE: 0.8 THOU/MM3 (ref 0–0.4)
ERYTHROCYTE [DISTWIDTH] IN BLOOD BY AUTOMATED COUNT: 18.6 % (ref 11.5–14.5)
ERYTHROCYTE [DISTWIDTH] IN BLOOD BY AUTOMATED COUNT: 70.3 FL (ref 35–45)
GFR SERPL CREATININE-BSD FRML MDRD: 15 ML/MIN/1.73M2
GLUCOSE BLD-MCNC: 110 MG/DL (ref 70–108)
HCT VFR BLD CALC: 25.1 % (ref 37–47)
HEMOGLOBIN: 7.4 GM/DL (ref 12–16)
IMMATURE GRANS (ABS): 0.19 THOU/MM3 (ref 0–0.07)
IMMATURE GRANULOCYTES: 1.3 %
LYMPHOCYTES # BLD: 8.9 %
LYMPHOCYTES ABSOLUTE: 1.3 THOU/MM3 (ref 1–4.8)
MCH RBC QN AUTO: 30.3 PG (ref 26–33)
MCHC RBC AUTO-ENTMCNC: 29.5 GM/DL (ref 32.2–35.5)
MCV RBC AUTO: 102.9 FL (ref 81–99)
MONOCYTES # BLD: 8.8 %
MONOCYTES ABSOLUTE: 1.3 THOU/MM3 (ref 0.4–1.3)
NUCLEATED RED BLOOD CELLS: 0 /100 WBC
PLATELET # BLD: 350 THOU/MM3 (ref 130–400)
PMV BLD AUTO: 9.2 FL (ref 9.4–12.4)
POTASSIUM SERPL-SCNC: 5.4 MEQ/L (ref 3.5–5.2)
RBC # BLD: 2.44 MILL/MM3 (ref 4.2–5.4)
SEG NEUTROPHILS: 74.8 %
SEGMENTED NEUTROPHILS ABSOLUTE COUNT: 11.2 THOU/MM3 (ref 1.8–7.7)
SODIUM BLD-SCNC: 148 MEQ/L (ref 135–145)
WBC # BLD: 15 THOU/MM3 (ref 4.8–10.8)

## 2021-12-21 PROCEDURE — 71045 X-RAY EXAM CHEST 1 VIEW: CPT

## 2021-12-22 LAB
ANION GAP SERPL CALCULATED.3IONS-SCNC: 14 MEQ/L (ref 8–16)
BASE EXCESS (CALCULATED): 0.5 MMOL/L (ref -2.5–2.5)
BUN BLDV-MCNC: 108 MG/DL (ref 7–22)
CALCIUM SERPL-MCNC: 9.8 MG/DL (ref 8.5–10.5)
CHLORIDE BLD-SCNC: 106 MEQ/L (ref 98–111)
CO2: 23 MEQ/L (ref 23–33)
COLLECTED BY:: ABNORMAL
CREAT SERPL-MCNC: 3 MG/DL (ref 0.4–1.2)
DEVICE: ABNORMAL
GFR SERPL CREATININE-BSD FRML MDRD: 15 ML/MIN/1.73M2
GLUCOSE BLD-MCNC: 116 MG/DL (ref 70–108)
HCO3: 26 MMOL/L (ref 23–28)
IFIO2: 24
O2 SATURATION: 94 %
PCO2: 47 MMHG (ref 35–45)
PH BLOOD GAS: 7.35 (ref 7.35–7.45)
PO2: 76 MMHG (ref 71–104)
POTASSIUM SERPL-SCNC: 4.7 MEQ/L (ref 3.5–5.2)
SODIUM BLD-SCNC: 143 MEQ/L (ref 135–145)
SOURCE, BLOOD GAS: ABNORMAL

## 2021-12-23 LAB
ANION GAP SERPL CALCULATED.3IONS-SCNC: 12 MEQ/L (ref 8–16)
BUN BLDV-MCNC: 101 MG/DL (ref 7–22)
CALCIUM SERPL-MCNC: 9.9 MG/DL (ref 8.5–10.5)
CHLORIDE BLD-SCNC: 101 MEQ/L (ref 98–111)
CO2: 25 MEQ/L (ref 23–33)
CREAT SERPL-MCNC: 2.5 MG/DL (ref 0.4–1.2)
GFR SERPL CREATININE-BSD FRML MDRD: 19 ML/MIN/1.73M2
GLUCOSE BLD-MCNC: 128 MG/DL (ref 70–108)
POTASSIUM SERPL-SCNC: 4.2 MEQ/L (ref 3.5–5.2)
SODIUM BLD-SCNC: 138 MEQ/L (ref 135–145)

## 2021-12-24 LAB
ANION GAP SERPL CALCULATED.3IONS-SCNC: 13 MEQ/L (ref 8–16)
BUN BLDV-MCNC: 59 MG/DL (ref 7–22)
CALCIUM SERPL-MCNC: 8.8 MG/DL (ref 8.5–10.5)
CHLORIDE BLD-SCNC: 105 MEQ/L (ref 98–111)
CO2: 23 MEQ/L (ref 23–33)
CREAT SERPL-MCNC: 1.7 MG/DL (ref 0.4–1.2)
GFR SERPL CREATININE-BSD FRML MDRD: 30 ML/MIN/1.73M2
GLUCOSE BLD-MCNC: 115 MG/DL (ref 70–108)
POTASSIUM SERPL-SCNC: 3.9 MEQ/L (ref 3.5–5.2)
SODIUM BLD-SCNC: 141 MEQ/L (ref 135–145)

## 2021-12-25 LAB
ANISOCYTOSIS: PRESENT
BASOPHILS # BLD: 0.5 %
BASOPHILS ABSOLUTE: 0.1 THOU/MM3 (ref 0–0.1)
EOSINOPHIL # BLD: 7.9 %
EOSINOPHILS ABSOLUTE: 1 THOU/MM3 (ref 0–0.4)
ERYTHROCYTE [DISTWIDTH] IN BLOOD BY AUTOMATED COUNT: 19.2 % (ref 11.5–14.5)
ERYTHROCYTE [DISTWIDTH] IN BLOOD BY AUTOMATED COUNT: 71.7 FL (ref 35–45)
HCT VFR BLD CALC: 23.7 % (ref 37–47)
HEMOGLOBIN: 7 GM/DL (ref 12–16)
IMMATURE GRANS (ABS): 0.25 THOU/MM3 (ref 0–0.07)
IMMATURE GRANULOCYTES: 1.9 %
LYMPHOCYTES # BLD: 8.7 %
LYMPHOCYTES ABSOLUTE: 1.1 THOU/MM3 (ref 1–4.8)
MCH RBC QN AUTO: 30.3 PG (ref 26–33)
MCHC RBC AUTO-ENTMCNC: 29.5 GM/DL (ref 32.2–35.5)
MCV RBC AUTO: 102.6 FL (ref 81–99)
MONOCYTES # BLD: 8.2 %
MONOCYTES ABSOLUTE: 1.1 THOU/MM3 (ref 0.4–1.3)
NUCLEATED RED BLOOD CELLS: 0 /100 WBC
PLATELET # BLD: 330 THOU/MM3 (ref 130–400)
PLATELET ESTIMATE: ADEQUATE
PMV BLD AUTO: 9.1 FL (ref 9.4–12.4)
RBC # BLD: 2.31 MILL/MM3 (ref 4.2–5.4)
SCAN OF BLOOD SMEAR: NORMAL
SEG NEUTROPHILS: 72.8 %
SEGMENTED NEUTROPHILS ABSOLUTE COUNT: 9.4 THOU/MM3 (ref 1.8–7.7)
WBC # BLD: 12.9 THOU/MM3 (ref 4.8–10.8)

## 2021-12-26 LAB
ABO: NORMAL
ANION GAP SERPL CALCULATED.3IONS-SCNC: 12 MEQ/L (ref 8–16)
ANISOCYTOSIS: PRESENT
ANTIBODY IDENTIFICATION: NORMAL
ANTIBODY SCREEN: NORMAL
BASOPHILIA: ABNORMAL
BASOPHILS # BLD: 0.5 %
BASOPHILS ABSOLUTE: 0.1 THOU/MM3 (ref 0–0.1)
BUN BLDV-MCNC: 75 MG/DL (ref 7–22)
C (BIG) ANTIGEN: NORMAL
CALCIUM SERPL-MCNC: 9.5 MG/DL (ref 8.5–10.5)
CHLORIDE BLD-SCNC: 96 MEQ/L (ref 98–111)
CO2: 24 MEQ/L (ref 23–33)
CREAT SERPL-MCNC: 2.1 MG/DL (ref 0.4–1.2)
DAT IGG: NORMAL
DIRECT COOMBS: NORMAL
E (BIG) ANTIGEN: NORMAL
EOSINOPHIL # BLD: 9.2 %
EOSINOPHILS ABSOLUTE: 1.1 THOU/MM3 (ref 0–0.4)
ERYTHROCYTE [DISTWIDTH] IN BLOOD BY AUTOMATED COUNT: 19.2 % (ref 11.5–14.5)
ERYTHROCYTE [DISTWIDTH] IN BLOOD BY AUTOMATED COUNT: 69.2 FL (ref 35–45)
FERRITIN: 1004 NG/ML (ref 10–291)
GFR SERPL CREATININE-BSD FRML MDRD: 23 ML/MIN/1.73M2
GLUCOSE BLD-MCNC: 97 MG/DL (ref 70–108)
HCT VFR BLD CALC: 22.1 % (ref 37–47)
HCT VFR BLD CALC: 26.1 % (ref 37–47)
HEMOGLOBIN: 6.8 GM/DL (ref 12–16)
HEMOGLOBIN: 8 GM/DL (ref 12–16)
IMMATURE GRANS (ABS): 0.37 THOU/MM3 (ref 0–0.07)
IMMATURE GRANULOCYTES: 3.1 %
IRON SATURATION: 22 % (ref 20–50)
IRON: 32 UG/DL (ref 50–170)
LYMPHOCYTES # BLD: 10.6 %
LYMPHOCYTES ABSOLUTE: 1.3 THOU/MM3 (ref 1–4.8)
MCH RBC QN AUTO: 30.9 PG (ref 26–33)
MCHC RBC AUTO-ENTMCNC: 30.8 GM/DL (ref 32.2–35.5)
MCV RBC AUTO: 100.5 FL (ref 81–99)
MONOCYTES # BLD: 8 %
MONOCYTES ABSOLUTE: 1 THOU/MM3 (ref 0.4–1.3)
NUCLEATED RED BLOOD CELLS: 0 /100 WBC
PLATELET # BLD: 289 THOU/MM3 (ref 130–400)
PLATELET ESTIMATE: ADEQUATE
PMV BLD AUTO: 8.7 FL (ref 9.4–12.4)
POTASSIUM SERPL-SCNC: 4 MEQ/L (ref 3.5–5.2)
RBC # BLD: 2.2 MILL/MM3 (ref 4.2–5.4)
RH FACTOR: NORMAL
SCAN OF BLOOD SMEAR: NORMAL
SEG NEUTROPHILS: 68.6 %
SEGMENTED NEUTROPHILS ABSOLUTE COUNT: 8.3 THOU/MM3 (ref 1.8–7.7)
SODIUM BLD-SCNC: 132 MEQ/L (ref 135–145)
TOTAL IRON BINDING CAPACITY: 145 UG/DL (ref 171–450)
WBC # BLD: 12.1 THOU/MM3 (ref 4.8–10.8)

## 2021-12-27 LAB
ALLEN TEST: POSITIVE
ANION GAP SERPL CALCULATED.3IONS-SCNC: 13 MEQ/L (ref 8–16)
BASE EXCESS (CALCULATED): -1.7 MMOL/L (ref -2.5–2.5)
BUN BLDV-MCNC: 79 MG/DL (ref 7–22)
CALCIUM SERPL-MCNC: 9.5 MG/DL (ref 8.5–10.5)
CHLORIDE BLD-SCNC: 98 MEQ/L (ref 98–111)
CO2: 21 MEQ/L (ref 23–33)
COLLECTED BY:: ABNORMAL
CREAT SERPL-MCNC: 2.3 MG/DL (ref 0.4–1.2)
DEVICE: ABNORMAL
ERYTHROCYTE [DISTWIDTH] IN BLOOD BY AUTOMATED COUNT: 19.5 % (ref 11.5–14.5)
ERYTHROCYTE [DISTWIDTH] IN BLOOD BY AUTOMATED COUNT: 69.6 FL (ref 35–45)
GFR SERPL CREATININE-BSD FRML MDRD: 21 ML/MIN/1.73M2
GLUCOSE BLD-MCNC: 117 MG/DL (ref 70–108)
HCO3: 24 MMOL/L (ref 23–28)
HCT VFR BLD CALC: 22.4 % (ref 37–47)
HCT VFR BLD CALC: 27.3 % (ref 37–47)
HEMOGLOBIN: 6.7 GM/DL (ref 12–16)
HEMOGLOBIN: 8.4 GM/DL (ref 12–16)
IFIO2: 2
MCH RBC QN AUTO: 30 PG (ref 26–33)
MCHC RBC AUTO-ENTMCNC: 29.9 GM/DL (ref 32.2–35.5)
MCV RBC AUTO: 100.4 FL (ref 81–99)
O2 SATURATION: 96 %
PCO2: 47 MMHG (ref 35–45)
PH BLOOD GAS: 7.32 (ref 7.35–7.45)
PLATELET # BLD: 275 THOU/MM3 (ref 130–400)
PMV BLD AUTO: 8.5 FL (ref 9.4–12.4)
PO2: 93 MMHG (ref 71–104)
POTASSIUM SERPL-SCNC: 4.3 MEQ/L (ref 3.5–5.2)
RBC # BLD: 2.23 MILL/MM3 (ref 4.2–5.4)
SODIUM BLD-SCNC: 132 MEQ/L (ref 135–145)
SOURCE, BLOOD GAS: ABNORMAL
WBC # BLD: 12.6 THOU/MM3 (ref 4.8–10.8)

## 2021-12-28 LAB
ANION GAP SERPL CALCULATED.3IONS-SCNC: 14 MEQ/L (ref 8–16)
ANISOCYTOSIS: PRESENT
BASOPHILIA: ABNORMAL
BASOPHILIC STIPPLING: ABNORMAL
BASOPHILS # BLD: 0.6 %
BASOPHILS ABSOLUTE: 0.1 THOU/MM3 (ref 0–0.1)
BUN BLDV-MCNC: 79 MG/DL (ref 7–22)
CALCIUM SERPL-MCNC: 9.6 MG/DL (ref 8.5–10.5)
CHLORIDE BLD-SCNC: 93 MEQ/L (ref 98–111)
CO2: 21 MEQ/L (ref 23–33)
CREAT SERPL-MCNC: 2.3 MG/DL (ref 0.4–1.2)
EOSINOPHIL # BLD: 9.1 %
EOSINOPHILS ABSOLUTE: 1.3 THOU/MM3 (ref 0–0.4)
ERYTHROCYTE [DISTWIDTH] IN BLOOD BY AUTOMATED COUNT: 19.1 % (ref 11.5–14.5)
ERYTHROCYTE [DISTWIDTH] IN BLOOD BY AUTOMATED COUNT: 65.3 FL (ref 35–45)
GFR SERPL CREATININE-BSD FRML MDRD: 21 ML/MIN/1.73M2
GLUCOSE BLD-MCNC: 117 MG/DL (ref 70–108)
HCT VFR BLD CALC: 27.4 % (ref 37–47)
HEMOGLOBIN: 8.7 GM/DL (ref 12–16)
IMMATURE GRANS (ABS): 0.39 THOU/MM3 (ref 0–0.07)
IMMATURE GRANULOCYTES: 2.8 %
LYMPHOCYTES # BLD: 8.9 %
LYMPHOCYTES ABSOLUTE: 1.2 THOU/MM3 (ref 1–4.8)
MCH RBC QN AUTO: 30.6 PG (ref 26–33)
MCHC RBC AUTO-ENTMCNC: 31.8 GM/DL (ref 32.2–35.5)
MCV RBC AUTO: 96.5 FL (ref 81–99)
MONOCYTES # BLD: 8.1 %
MONOCYTES ABSOLUTE: 1.1 THOU/MM3 (ref 0.4–1.3)
NUCLEATED RED BLOOD CELLS: 0 /100 WBC
PLATELET # BLD: 337 THOU/MM3 (ref 130–400)
PLATELET ESTIMATE: ADEQUATE
PMV BLD AUTO: 8.8 FL (ref 9.4–12.4)
POTASSIUM SERPL-SCNC: 4.3 MEQ/L (ref 3.5–5.2)
RBC # BLD: 2.84 MILL/MM3 (ref 4.2–5.4)
SCAN OF BLOOD SMEAR: NORMAL
SEG NEUTROPHILS: 70.5 %
SEGMENTED NEUTROPHILS ABSOLUTE COUNT: 9.7 THOU/MM3 (ref 1.8–7.7)
SODIUM BLD-SCNC: 128 MEQ/L (ref 135–145)
WBC # BLD: 13.8 THOU/MM3 (ref 4.8–10.8)

## 2021-12-29 LAB
ANION GAP SERPL CALCULATED.3IONS-SCNC: 16 MEQ/L (ref 8–16)
ANISOCYTOSIS: PRESENT
BASOPHILS # BLD: 0.4 %
BASOPHILS ABSOLUTE: 0 THOU/MM3 (ref 0–0.1)
BUN BLDV-MCNC: 83 MG/DL (ref 7–22)
CALCIUM SERPL-MCNC: 9.6 MG/DL (ref 8.5–10.5)
CHLORIDE BLD-SCNC: 93 MEQ/L (ref 98–111)
CO2: 20 MEQ/L (ref 23–33)
CREAT SERPL-MCNC: 2.1 MG/DL (ref 0.4–1.2)
EOSINOPHIL # BLD: 7.1 %
EOSINOPHILS ABSOLUTE: 0.9 THOU/MM3 (ref 0–0.4)
ERYTHROCYTE [DISTWIDTH] IN BLOOD BY AUTOMATED COUNT: 19.1 % (ref 11.5–14.5)
ERYTHROCYTE [DISTWIDTH] IN BLOOD BY AUTOMATED COUNT: 66.4 FL (ref 35–45)
GFR SERPL CREATININE-BSD FRML MDRD: 23 ML/MIN/1.73M2
GLUCOSE BLD-MCNC: 131 MG/DL (ref 70–108)
HCT VFR BLD CALC: 24.8 % (ref 37–47)
HEMOGLOBIN: 7.7 GM/DL (ref 12–16)
IMMATURE GRANS (ABS): 0.44 THOU/MM3 (ref 0–0.07)
IMMATURE GRANULOCYTES: 3.6 %
LYMPHOCYTES # BLD: 7.8 %
LYMPHOCYTES ABSOLUTE: 0.9 THOU/MM3 (ref 1–4.8)
MCH RBC QN AUTO: 30.4 PG (ref 26–33)
MCHC RBC AUTO-ENTMCNC: 31 GM/DL (ref 32.2–35.5)
MCV RBC AUTO: 98 FL (ref 81–99)
MONOCYTES # BLD: 8.6 %
MONOCYTES ABSOLUTE: 1 THOU/MM3 (ref 0.4–1.3)
NUCLEATED RED BLOOD CELLS: 0 /100 WBC
PLATELET # BLD: 323 THOU/MM3 (ref 130–400)
PMV BLD AUTO: 8.5 FL (ref 9.4–12.4)
POTASSIUM SERPL-SCNC: 4.3 MEQ/L (ref 3.5–5.2)
RBC # BLD: 2.53 MILL/MM3 (ref 4.2–5.4)
SEG NEUTROPHILS: 72.5 %
SEGMENTED NEUTROPHILS ABSOLUTE COUNT: 8.8 THOU/MM3 (ref 1.8–7.7)
SODIUM BLD-SCNC: 129 MEQ/L (ref 135–145)
WBC # BLD: 12.1 THOU/MM3 (ref 4.8–10.8)

## 2021-12-30 LAB
ANION GAP SERPL CALCULATED.3IONS-SCNC: 15 MEQ/L (ref 8–16)
BUN BLDV-MCNC: 86 MG/DL (ref 7–22)
CALCIUM SERPL-MCNC: 9.5 MG/DL (ref 8.5–10.5)
CHLORIDE BLD-SCNC: 94 MEQ/L (ref 98–111)
CO2: 20 MEQ/L (ref 23–33)
CREAT SERPL-MCNC: 2.3 MG/DL (ref 0.4–1.2)
ERYTHROCYTE [DISTWIDTH] IN BLOOD BY AUTOMATED COUNT: 19.3 % (ref 11.5–14.5)
ERYTHROCYTE [DISTWIDTH] IN BLOOD BY AUTOMATED COUNT: 67.7 FL (ref 35–45)
GFR SERPL CREATININE-BSD FRML MDRD: 21 ML/MIN/1.73M2
GLUCOSE BLD-MCNC: 118 MG/DL (ref 70–108)
HCT VFR BLD CALC: 25.8 % (ref 37–47)
HEMOGLOBIN: 8 GM/DL (ref 12–16)
MCH RBC QN AUTO: 30.5 PG (ref 26–33)
MCHC RBC AUTO-ENTMCNC: 31 GM/DL (ref 32.2–35.5)
MCV RBC AUTO: 98.5 FL (ref 81–99)
ORGANISM: ABNORMAL
PLATELET # BLD: 328 THOU/MM3 (ref 130–400)
PMV BLD AUTO: 8.5 FL (ref 9.4–12.4)
POTASSIUM SERPL-SCNC: 4.2 MEQ/L (ref 3.5–5.2)
RBC # BLD: 2.62 MILL/MM3 (ref 4.2–5.4)
SODIUM BLD-SCNC: 129 MEQ/L (ref 135–145)
URINE CULTURE, ROUTINE: ABNORMAL
WBC # BLD: 12.1 THOU/MM3 (ref 4.8–10.8)

## 2021-12-31 LAB
ANION GAP SERPL CALCULATED.3IONS-SCNC: 13 MEQ/L (ref 8–16)
BUN BLDV-MCNC: 83 MG/DL (ref 7–22)
CALCIUM SERPL-MCNC: 9.9 MG/DL (ref 8.5–10.5)
CHLORIDE BLD-SCNC: 95 MEQ/L (ref 98–111)
CO2: 22 MEQ/L (ref 23–33)
CREAT SERPL-MCNC: 2.2 MG/DL (ref 0.4–1.2)
GFR SERPL CREATININE-BSD FRML MDRD: 22 ML/MIN/1.73M2
GLUCOSE BLD-MCNC: 118 MG/DL (ref 70–108)
POTASSIUM SERPL-SCNC: 4.1 MEQ/L (ref 3.5–5.2)
SODIUM BLD-SCNC: 130 MEQ/L (ref 135–145)

## 2022-01-01 LAB
ANION GAP SERPL CALCULATED.3IONS-SCNC: 16 MEQ/L (ref 8–16)
BUN BLDV-MCNC: 87 MG/DL (ref 7–22)
CALCIUM SERPL-MCNC: 9.9 MG/DL (ref 8.5–10.5)
CHLORIDE BLD-SCNC: 99 MEQ/L (ref 98–111)
CO2: 21 MEQ/L (ref 23–33)
CREAT SERPL-MCNC: 2.3 MG/DL (ref 0.4–1.2)
GFR SERPL CREATININE-BSD FRML MDRD: 21 ML/MIN/1.73M2
GLUCOSE BLD-MCNC: 128 MG/DL (ref 70–108)
POTASSIUM SERPL-SCNC: 4.4 MEQ/L (ref 3.5–5.2)
SODIUM BLD-SCNC: 136 MEQ/L (ref 135–145)

## 2022-01-02 LAB
ANION GAP SERPL CALCULATED.3IONS-SCNC: 14 MEQ/L (ref 8–16)
ANISOCYTOSIS: PRESENT
BASOPHILS # BLD: 0.9 %
BASOPHILS ABSOLUTE: 0.1 THOU/MM3 (ref 0–0.1)
BUN BLDV-MCNC: 90 MG/DL (ref 7–22)
CALCIUM SERPL-MCNC: 9.9 MG/DL (ref 8.5–10.5)
CHLORIDE BLD-SCNC: 96 MEQ/L (ref 98–111)
CO2: 23 MEQ/L (ref 23–33)
CREAT SERPL-MCNC: 2.2 MG/DL (ref 0.4–1.2)
EOSINOPHIL # BLD: 9.9 %
EOSINOPHILS ABSOLUTE: 1.2 THOU/MM3 (ref 0–0.4)
ERYTHROCYTE [DISTWIDTH] IN BLOOD BY AUTOMATED COUNT: 18.9 % (ref 11.5–14.5)
ERYTHROCYTE [DISTWIDTH] IN BLOOD BY AUTOMATED COUNT: 69.3 FL (ref 35–45)
GFR SERPL CREATININE-BSD FRML MDRD: 22 ML/MIN/1.73M2
GLUCOSE BLD-MCNC: 91 MG/DL (ref 70–108)
HCT VFR BLD CALC: 29.2 % (ref 37–47)
HEMOGLOBIN: 8.8 GM/DL (ref 12–16)
IMMATURE GRANS (ABS): 0.29 THOU/MM3 (ref 0–0.07)
IMMATURE GRANULOCYTES: 2.5 %
LYMPHOCYTES # BLD: 10.7 %
LYMPHOCYTES ABSOLUTE: 1.3 THOU/MM3 (ref 1–4.8)
MCH RBC QN AUTO: 30.3 PG (ref 26–33)
MCHC RBC AUTO-ENTMCNC: 30.1 GM/DL (ref 32.2–35.5)
MCV RBC AUTO: 100.7 FL (ref 81–99)
MONOCYTES # BLD: 9.8 %
MONOCYTES ABSOLUTE: 1.2 THOU/MM3 (ref 0.4–1.3)
NUCLEATED RED BLOOD CELLS: 0 /100 WBC
PLATELET # BLD: 358 THOU/MM3 (ref 130–400)
PMV BLD AUTO: 8.4 FL (ref 9.4–12.4)
POTASSIUM SERPL-SCNC: 4 MEQ/L (ref 3.5–5.2)
RBC # BLD: 2.9 MILL/MM3 (ref 4.2–5.4)
SEG NEUTROPHILS: 66.2 %
SEGMENTED NEUTROPHILS ABSOLUTE COUNT: 7.8 THOU/MM3 (ref 1.8–7.7)
SODIUM BLD-SCNC: 133 MEQ/L (ref 135–145)
WBC # BLD: 11.8 THOU/MM3 (ref 4.8–10.8)

## 2022-01-03 ENCOUNTER — APPOINTMENT (OUTPATIENT)
Dept: ULTRASOUND IMAGING | Age: 72
End: 2022-01-03
Attending: INTERNAL MEDICINE
Payer: COMMERCIAL

## 2022-01-03 LAB
ANION GAP SERPL CALCULATED.3IONS-SCNC: 17 MEQ/L (ref 8–16)
BUN BLDV-MCNC: 91 MG/DL (ref 7–22)
CALCIUM SERPL-MCNC: 10 MG/DL (ref 8.5–10.5)
CHLORIDE BLD-SCNC: 96 MEQ/L (ref 98–111)
CO2: 19 MEQ/L (ref 23–33)
CREAT SERPL-MCNC: 2 MG/DL (ref 0.4–1.2)
GFR SERPL CREATININE-BSD FRML MDRD: 25 ML/MIN/1.73M2
GLUCOSE BLD-MCNC: 121 MG/DL (ref 70–108)
MAGNESIUM: 2.1 MG/DL (ref 1.6–2.4)
PHOSPHORUS: 4.6 MG/DL (ref 2.4–4.7)
POTASSIUM SERPL-SCNC: 4.7 MEQ/L (ref 3.5–5.2)
SODIUM BLD-SCNC: 132 MEQ/L (ref 135–145)
SODIUM URINE: 34 MEQ/L
SODIUM URINE: 34 MEQ/L

## 2022-01-03 PROCEDURE — 76770 US EXAM ABDO BACK WALL COMP: CPT

## 2022-01-04 LAB
ANION GAP SERPL CALCULATED.3IONS-SCNC: 17 MEQ/L (ref 8–16)
ANISOCYTOSIS: PRESENT
BASOPHILS # BLD: 0.8 %
BASOPHILS ABSOLUTE: 0.1 THOU/MM3 (ref 0–0.1)
BUN BLDV-MCNC: 94 MG/DL (ref 7–22)
CALCIUM SERPL-MCNC: 10.3 MG/DL (ref 8.5–10.5)
CHLORIDE BLD-SCNC: 98 MEQ/L (ref 98–111)
CO2: 20 MEQ/L (ref 23–33)
CREAT SERPL-MCNC: 2.1 MG/DL (ref 0.4–1.2)
EOSINOPHIL # BLD: 9.7 %
EOSINOPHILS ABSOLUTE: 1.2 THOU/MM3 (ref 0–0.4)
ERYTHROCYTE [DISTWIDTH] IN BLOOD BY AUTOMATED COUNT: 18.8 % (ref 11.5–14.5)
ERYTHROCYTE [DISTWIDTH] IN BLOOD BY AUTOMATED COUNT: 70.2 FL (ref 35–45)
GFR SERPL CREATININE-BSD FRML MDRD: 23 ML/MIN/1.73M2
GLUCOSE BLD-MCNC: 114 MG/DL (ref 70–108)
HCT VFR BLD CALC: 28.5 % (ref 37–47)
HEMOGLOBIN: 8.5 GM/DL (ref 12–16)
IMMATURE GRANS (ABS): 0.25 THOU/MM3 (ref 0–0.07)
IMMATURE GRANULOCYTES: 2.1 %
LYMPHOCYTES # BLD: 9.4 %
LYMPHOCYTES ABSOLUTE: 1.1 THOU/MM3 (ref 1–4.8)
MCH RBC QN AUTO: 30.2 PG (ref 26–33)
MCHC RBC AUTO-ENTMCNC: 29.8 GM/DL (ref 32.2–35.5)
MCV RBC AUTO: 101.4 FL (ref 81–99)
MONOCYTES # BLD: 7.5 %
MONOCYTES ABSOLUTE: 0.9 THOU/MM3 (ref 0.4–1.3)
NUCLEATED RED BLOOD CELLS: 0 /100 WBC
PLATELET # BLD: 353 THOU/MM3 (ref 130–400)
PMV BLD AUTO: 8.5 FL (ref 9.4–12.4)
POTASSIUM SERPL-SCNC: 3.9 MEQ/L (ref 3.5–5.2)
PREALBUMIN: 14.6 MG/DL (ref 20–40)
RBC # BLD: 2.81 MILL/MM3 (ref 4.2–5.4)
SEG NEUTROPHILS: 70.5 %
SEGMENTED NEUTROPHILS ABSOLUTE COUNT: 8.6 THOU/MM3 (ref 1.8–7.7)
SODIUM BLD-SCNC: 135 MEQ/L (ref 135–145)
WBC # BLD: 12.2 THOU/MM3 (ref 4.8–10.8)

## 2022-01-05 ENCOUNTER — APPOINTMENT (OUTPATIENT)
Dept: GENERAL RADIOLOGY | Age: 72
End: 2022-01-05
Attending: INTERNAL MEDICINE
Payer: COMMERCIAL

## 2022-01-05 LAB
ANION GAP SERPL CALCULATED.3IONS-SCNC: 15 MEQ/L (ref 8–16)
BUN BLDV-MCNC: 96 MG/DL (ref 7–22)
CALCIUM SERPL-MCNC: 10.1 MG/DL (ref 8.5–10.5)
CHLORIDE BLD-SCNC: 102 MEQ/L (ref 98–111)
CO2: 21 MEQ/L (ref 23–33)
CREAT SERPL-MCNC: 2 MG/DL (ref 0.4–1.2)
GFR SERPL CREATININE-BSD FRML MDRD: 25 ML/MIN/1.73M2
GLUCOSE BLD-MCNC: 116 MG/DL (ref 70–108)
POTASSIUM SERPL-SCNC: 3.8 MEQ/L (ref 3.5–5.2)
SODIUM BLD-SCNC: 138 MEQ/L (ref 135–145)

## 2022-01-05 PROCEDURE — 74018 RADEX ABDOMEN 1 VIEW: CPT

## 2022-01-07 DIAGNOSIS — N20.0 KIDNEY STONE: Primary | ICD-10-CM

## 2022-01-07 LAB
ANION GAP SERPL CALCULATED.3IONS-SCNC: 15 MEQ/L (ref 8–16)
BUN BLDV-MCNC: 88 MG/DL (ref 7–22)
CALCIUM SERPL-MCNC: 10.1 MG/DL (ref 8.5–10.5)
CHLORIDE BLD-SCNC: 104 MEQ/L (ref 98–111)
CO2: 22 MEQ/L (ref 23–33)
CREAT SERPL-MCNC: 2.1 MG/DL (ref 0.4–1.2)
GFR SERPL CREATININE-BSD FRML MDRD: 23 ML/MIN/1.73M2
GLUCOSE BLD-MCNC: 114 MG/DL (ref 70–108)
POTASSIUM SERPL-SCNC: 4.1 MEQ/L (ref 3.5–5.2)
SODIUM BLD-SCNC: 141 MEQ/L (ref 135–145)

## 2022-01-07 NOTE — TELEPHONE ENCOUNTER
Dr Naun Marquez does not want to schedule surgery at this time. We will make arrangements to see the patient in the office upon discharge from [facility]. I will put in a order for IR to change the Nephrostomy tube.

## 2022-01-08 LAB
ANION GAP SERPL CALCULATED.3IONS-SCNC: 12 MEQ/L (ref 8–16)
BUN BLDV-MCNC: 92 MG/DL (ref 7–22)
CALCIUM SERPL-MCNC: 10 MG/DL (ref 8.5–10.5)
CHLORIDE BLD-SCNC: 104 MEQ/L (ref 98–111)
CO2: 24 MEQ/L (ref 23–33)
CREAT SERPL-MCNC: 2.1 MG/DL (ref 0.4–1.2)
GFR SERPL CREATININE-BSD FRML MDRD: 23 ML/MIN/1.73M2
GLUCOSE BLD-MCNC: 101 MG/DL (ref 70–108)
POTASSIUM SERPL-SCNC: 3.8 MEQ/L (ref 3.5–5.2)
SODIUM BLD-SCNC: 140 MEQ/L (ref 135–145)

## 2022-01-09 LAB
ANION GAP SERPL CALCULATED.3IONS-SCNC: 12 MEQ/L (ref 8–16)
ANISOCYTOSIS: PRESENT
BASOPHILS # BLD: 0.5 %
BASOPHILS ABSOLUTE: 0.1 THOU/MM3 (ref 0–0.1)
BUN BLDV-MCNC: 91 MG/DL (ref 7–22)
CALCIUM SERPL-MCNC: 10.1 MG/DL (ref 8.5–10.5)
CHLORIDE BLD-SCNC: 107 MEQ/L (ref 98–111)
CO2: 23 MEQ/L (ref 23–33)
CREAT SERPL-MCNC: 2.1 MG/DL (ref 0.4–1.2)
EOSINOPHIL # BLD: 12.8 %
EOSINOPHILS ABSOLUTE: 1.5 THOU/MM3 (ref 0–0.4)
ERYTHROCYTE [DISTWIDTH] IN BLOOD BY AUTOMATED COUNT: 18.5 % (ref 11.5–14.5)
ERYTHROCYTE [DISTWIDTH] IN BLOOD BY AUTOMATED COUNT: 69.1 FL (ref 35–45)
GFR SERPL CREATININE-BSD FRML MDRD: 23 ML/MIN/1.73M2
GLUCOSE BLD-MCNC: 109 MG/DL (ref 70–108)
HCT VFR BLD CALC: 27.6 % (ref 37–47)
HEMOGLOBIN: 8.1 GM/DL (ref 12–16)
IMMATURE GRANS (ABS): 0.13 THOU/MM3 (ref 0–0.07)
IMMATURE GRANULOCYTES: 1.1 %
LYMPHOCYTES # BLD: 9.4 %
LYMPHOCYTES ABSOLUTE: 1.1 THOU/MM3 (ref 1–4.8)
MCH RBC QN AUTO: 30.1 PG (ref 26–33)
MCHC RBC AUTO-ENTMCNC: 29.3 GM/DL (ref 32.2–35.5)
MCV RBC AUTO: 102.6 FL (ref 81–99)
MONOCYTES # BLD: 6.2 %
MONOCYTES ABSOLUTE: 0.7 THOU/MM3 (ref 0.4–1.3)
NUCLEATED RED BLOOD CELLS: 0 /100 WBC
PLATELET # BLD: 263 THOU/MM3 (ref 130–400)
PMV BLD AUTO: 8.2 FL (ref 9.4–12.4)
POTASSIUM SERPL-SCNC: 4.1 MEQ/L (ref 3.5–5.2)
RBC # BLD: 2.69 MILL/MM3 (ref 4.2–5.4)
SEG NEUTROPHILS: 70 %
SEGMENTED NEUTROPHILS ABSOLUTE COUNT: 8.4 THOU/MM3 (ref 1.8–7.7)
SODIUM BLD-SCNC: 142 MEQ/L (ref 135–145)
WBC # BLD: 12 THOU/MM3 (ref 4.8–10.8)

## 2022-01-10 LAB
ALBUMIN SERPL-MCNC: 2.4 G/DL (ref 3.5–5.1)
ANION GAP SERPL CALCULATED.3IONS-SCNC: 13 MEQ/L (ref 8–16)
BUN BLDV-MCNC: 87 MG/DL (ref 7–22)
CALCIUM SERPL-MCNC: 10 MG/DL (ref 8.5–10.5)
CHLORIDE BLD-SCNC: 109 MEQ/L (ref 98–111)
CO2: 22 MEQ/L (ref 23–33)
CREAT SERPL-MCNC: 1.9 MG/DL (ref 0.4–1.2)
GFR SERPL CREATININE-BSD FRML MDRD: 26 ML/MIN/1.73M2
GLUCOSE BLD-MCNC: 108 MG/DL (ref 70–108)
POTASSIUM SERPL-SCNC: 3.7 MEQ/L (ref 3.5–5.2)
PREALBUMIN: 11.3 MG/DL (ref 20–40)
SODIUM BLD-SCNC: 144 MEQ/L (ref 135–145)

## 2022-01-11 LAB
ANION GAP SERPL CALCULATED.3IONS-SCNC: 12 MEQ/L (ref 8–16)
BUN BLDV-MCNC: 81 MG/DL (ref 7–22)
CALCIUM SERPL-MCNC: 9.7 MG/DL (ref 8.5–10.5)
CHLORIDE BLD-SCNC: 109 MEQ/L (ref 98–111)
CO2: 23 MEQ/L (ref 23–33)
CREAT SERPL-MCNC: 1.8 MG/DL (ref 0.4–1.2)
GFR SERPL CREATININE-BSD FRML MDRD: 28 ML/MIN/1.73M2
GLUCOSE BLD-MCNC: 105 MG/DL (ref 70–108)
MAGNESIUM: 1.8 MG/DL (ref 1.6–2.4)
PHOSPHORUS: 4.5 MG/DL (ref 2.4–4.7)
POTASSIUM SERPL-SCNC: 3.8 MEQ/L (ref 3.5–5.2)
SODIUM BLD-SCNC: 144 MEQ/L (ref 135–145)

## 2022-01-11 RX ORDER — FENTANYL CITRATE 50 UG/ML
50 INJECTION, SOLUTION INTRAMUSCULAR; INTRAVENOUS ONCE
Status: CANCELLED | OUTPATIENT
Start: 2022-01-11 | End: 2022-01-11

## 2022-01-11 RX ORDER — MIDAZOLAM HYDROCHLORIDE 1 MG/ML
1 INJECTION INTRAMUSCULAR; INTRAVENOUS ONCE
Status: CANCELLED | OUTPATIENT
Start: 2022-01-11 | End: 2022-01-11

## 2022-01-11 RX ORDER — SODIUM CHLORIDE 450 MG/100ML
INJECTION, SOLUTION INTRAVENOUS CONTINUOUS
Status: CANCELLED | OUTPATIENT
Start: 2022-01-11

## 2022-01-12 ENCOUNTER — HOSPITAL ENCOUNTER (OUTPATIENT)
Dept: INTERVENTIONAL RADIOLOGY/VASCULAR | Age: 72
Discharge: HOME | End: 2022-01-12
Attending: INTERNAL MEDICINE
Payer: COMMERCIAL

## 2022-01-12 VITALS — RESPIRATION RATE: 20 BRPM | HEART RATE: 87 BPM | OXYGEN SATURATION: 99 %

## 2022-01-12 DIAGNOSIS — N20.0 KIDNEY STONE: ICD-10-CM

## 2022-01-12 LAB
ANION GAP SERPL CALCULATED.3IONS-SCNC: 11 MEQ/L (ref 8–16)
BUN BLDV-MCNC: 79 MG/DL (ref 7–22)
CALCIUM SERPL-MCNC: 9.9 MG/DL (ref 8.5–10.5)
CHLORIDE BLD-SCNC: 111 MEQ/L (ref 98–111)
CO2: 23 MEQ/L (ref 23–33)
CREAT SERPL-MCNC: 1.8 MG/DL (ref 0.4–1.2)
ERYTHROCYTE [DISTWIDTH] IN BLOOD BY AUTOMATED COUNT: 18.7 % (ref 11.5–14.5)
ERYTHROCYTE [DISTWIDTH] IN BLOOD BY AUTOMATED COUNT: 72.1 FL (ref 35–45)
GFR SERPL CREATININE-BSD FRML MDRD: 28 ML/MIN/1.73M2
GLUCOSE BLD-MCNC: 87 MG/DL (ref 70–108)
HCT VFR BLD CALC: 28.5 % (ref 37–47)
HEMOGLOBIN: 8.1 GM/DL (ref 12–16)
MCH RBC QN AUTO: 29.8 PG (ref 26–33)
MCHC RBC AUTO-ENTMCNC: 28.4 GM/DL (ref 32.2–35.5)
MCV RBC AUTO: 104.8 FL (ref 81–99)
PLATELET # BLD: 263 THOU/MM3 (ref 130–400)
PMV BLD AUTO: 8.4 FL (ref 9.4–12.4)
POTASSIUM SERPL-SCNC: 3.8 MEQ/L (ref 3.5–5.2)
RBC # BLD: 2.72 MILL/MM3 (ref 4.2–5.4)
SODIUM BLD-SCNC: 145 MEQ/L (ref 135–145)
WBC # BLD: 12.1 THOU/MM3 (ref 4.8–10.8)

## 2022-01-12 PROCEDURE — 2709999900 IR INTERVENTIONAL RADIOLOGY PROCEDURE REQUEST

## 2022-01-12 PROCEDURE — 6360000004 HC RX CONTRAST MEDICATION: Performed by: RADIOLOGY

## 2022-01-12 PROCEDURE — 50435 EXCHANGE NEPHROSTOMY CATH: CPT

## 2022-01-12 RX ADMIN — IOTHALAMATE MEGLUMINE 15 ML: 600 INJECTION INTRAVASCULAR at 11:38

## 2022-01-12 NOTE — PROGRESS NOTES
48 Pt in specials radiology for left nephrostomy tube exchange. Explained procedure to pt and pt verbalizes understanding. Consent signed. 1114 Pt positioned prone on table and attached to monitor. 1122 Left nephrostomy tube dressing removed. Site without redness, swelling or hematoma. 1123 Site cleansed with betadine, prepped and draped. [address] Lolis Duncan to assess pt.  1129 Left nephrostomy tube removed over wire. 1133 10 fr nephrostomy tube positioned in left kidney per Dr Lolis Duncan. 1137 Catheter sutured to skin. 1139 Split gauze dressing applied. Site without redness, swelling or hematoma. 1145 Pt positioned on bed for comfort. Report called to [facility] RN.  0514 Family updated. 1159 Pt transferred to [facility] per bed.

## 2022-01-12 NOTE — OP NOTE
Department of Radiology  Post Procedure Progress Note      Pre-Procedure Diagnosis:  Ureteral obstruction    Procedure Performed:  Nephrostomy tube exchange    Anesthesia: local     Findings: successful    Immediate Complications:  None    Estimated Blood Loss: minimal    SEE DICTATED PROCEDURE NOTE FOR COMPLETE DETAILS.     Lizet García MD   1/12/2022 11:40 AM

## 2022-01-13 LAB
ANION GAP SERPL CALCULATED.3IONS-SCNC: 14 MEQ/L (ref 8–16)
ANISOCYTOSIS: PRESENT
BASOPHILS # BLD: 0.4 %
BASOPHILS ABSOLUTE: 0.1 THOU/MM3 (ref 0–0.1)
BUN BLDV-MCNC: 78 MG/DL (ref 7–22)
CALCIUM SERPL-MCNC: 10.2 MG/DL (ref 8.5–10.5)
CHLORIDE BLD-SCNC: 113 MEQ/L (ref 98–111)
CO2: 20 MEQ/L (ref 23–33)
CREAT SERPL-MCNC: 1.8 MG/DL (ref 0.4–1.2)
EOSINOPHIL # BLD: 6.6 %
EOSINOPHILS ABSOLUTE: 0.9 THOU/MM3 (ref 0–0.4)
ERYTHROCYTE [DISTWIDTH] IN BLOOD BY AUTOMATED COUNT: 19.2 % (ref 11.5–14.5)
ERYTHROCYTE [DISTWIDTH] IN BLOOD BY AUTOMATED COUNT: 75.7 FL (ref 35–45)
GFR SERPL CREATININE-BSD FRML MDRD: 28 ML/MIN/1.73M2
GLUCOSE BLD-MCNC: 114 MG/DL (ref 70–108)
HCT VFR BLD CALC: 33.2 % (ref 37–47)
HEMOGLOBIN: 9.1 GM/DL (ref 12–16)
HYPOCHROMIA: PRESENT
IMMATURE GRANS (ABS): 0.08 THOU/MM3 (ref 0–0.07)
IMMATURE GRANULOCYTES: 0.6 %
LYMPHOCYTES # BLD: 10.6 %
LYMPHOCYTES ABSOLUTE: 1.4 THOU/MM3 (ref 1–4.8)
MCH RBC QN AUTO: 29.4 PG (ref 26–33)
MCHC RBC AUTO-ENTMCNC: 27.4 GM/DL (ref 32.2–35.5)
MCV RBC AUTO: 107.4 FL (ref 81–99)
MONOCYTES # BLD: 7.2 %
MONOCYTES ABSOLUTE: 1 THOU/MM3 (ref 0.4–1.3)
NUCLEATED RED BLOOD CELLS: 0 /100 WBC
PLATELET # BLD: 268 THOU/MM3 (ref 130–400)
PMV BLD AUTO: 8.7 FL (ref 9.4–12.4)
POTASSIUM SERPL-SCNC: 3.8 MEQ/L (ref 3.5–5.2)
RBC # BLD: 3.09 MILL/MM3 (ref 4.2–5.4)
SEG NEUTROPHILS: 74.6 %
SEGMENTED NEUTROPHILS ABSOLUTE COUNT: 10.1 THOU/MM3 (ref 1.8–7.7)
SODIUM BLD-SCNC: 147 MEQ/L (ref 135–145)
WBC # BLD: 13.5 THOU/MM3 (ref 4.8–10.8)

## 2022-01-14 ENCOUNTER — APPOINTMENT (OUTPATIENT)
Dept: GENERAL RADIOLOGY | Age: 72
End: 2022-01-14
Attending: INTERNAL MEDICINE
Payer: COMMERCIAL

## 2022-01-14 LAB
ANION GAP SERPL CALCULATED.3IONS-SCNC: 15 MEQ/L (ref 8–16)
ANISOCYTOSIS: PRESENT
BASOPHILS # BLD: 0.5 %
BASOPHILS ABSOLUTE: 0.1 THOU/MM3 (ref 0–0.1)
BUN BLDV-MCNC: 76 MG/DL (ref 7–22)
CALCIUM SERPL-MCNC: 10.2 MG/DL (ref 8.5–10.5)
CHLORIDE BLD-SCNC: 112 MEQ/L (ref 98–111)
CO2: 22 MEQ/L (ref 23–33)
CREAT SERPL-MCNC: 1.8 MG/DL (ref 0.4–1.2)
EOSINOPHIL # BLD: 9.1 %
EOSINOPHILS ABSOLUTE: 1.2 THOU/MM3 (ref 0–0.4)
ERYTHROCYTE [DISTWIDTH] IN BLOOD BY AUTOMATED COUNT: 19.4 % (ref 11.5–14.5)
ERYTHROCYTE [DISTWIDTH] IN BLOOD BY AUTOMATED COUNT: 75.4 FL (ref 35–45)
GFR SERPL CREATININE-BSD FRML MDRD: 28 ML/MIN/1.73M2
GLUCOSE BLD-MCNC: 105 MG/DL (ref 70–108)
HCT VFR BLD CALC: 30.4 % (ref 37–47)
HEMOGLOBIN: 8.7 GM/DL (ref 12–16)
HYPOCHROMIA: PRESENT
IMMATURE GRANS (ABS): 0.07 THOU/MM3 (ref 0–0.07)
IMMATURE GRANULOCYTES: 0.5 %
LYMPHOCYTES # BLD: 8.2 %
LYMPHOCYTES ABSOLUTE: 1.1 THOU/MM3 (ref 1–4.8)
MCH RBC QN AUTO: 30.7 PG (ref 26–33)
MCHC RBC AUTO-ENTMCNC: 28.6 GM/DL (ref 32.2–35.5)
MCV RBC AUTO: 107.4 FL (ref 81–99)
MONOCYTES # BLD: 6.8 %
MONOCYTES ABSOLUTE: 0.9 THOU/MM3 (ref 0.4–1.3)
NUCLEATED RED BLOOD CELLS: 0 /100 WBC
PLATELET # BLD: 244 THOU/MM3 (ref 130–400)
PLATELET ESTIMATE: ADEQUATE
PMV BLD AUTO: 8.9 FL (ref 9.4–12.4)
POTASSIUM SERPL-SCNC: 3.5 MEQ/L (ref 3.5–5.2)
RBC # BLD: 2.83 MILL/MM3 (ref 4.2–5.4)
SCAN OF BLOOD SMEAR: NORMAL
SEG NEUTROPHILS: 74.9 %
SEGMENTED NEUTROPHILS ABSOLUTE COUNT: 9.7 THOU/MM3 (ref 1.8–7.7)
SODIUM BLD-SCNC: 149 MEQ/L (ref 135–145)
WBC # BLD: 12.9 THOU/MM3 (ref 4.8–10.8)

## 2022-01-14 PROCEDURE — 74018 RADEX ABDOMEN 1 VIEW: CPT

## 2022-01-15 LAB
ANION GAP SERPL CALCULATED.3IONS-SCNC: 16 MEQ/L (ref 8–16)
BUN BLDV-MCNC: 77 MG/DL (ref 7–22)
CALCIUM SERPL-MCNC: 10 MG/DL (ref 8.5–10.5)
CHLORIDE BLD-SCNC: 111 MEQ/L (ref 98–111)
CO2: 20 MEQ/L (ref 23–33)
CREAT SERPL-MCNC: 1.8 MG/DL (ref 0.4–1.2)
GFR SERPL CREATININE-BSD FRML MDRD: 28 ML/MIN/1.73M2
GLUCOSE BLD-MCNC: 110 MG/DL (ref 70–108)
POTASSIUM SERPL-SCNC: 3.8 MEQ/L (ref 3.5–5.2)
SODIUM BLD-SCNC: 147 MEQ/L (ref 135–145)

## 2022-01-16 LAB
ANION GAP SERPL CALCULATED.3IONS-SCNC: 15 MEQ/L (ref 8–16)
BUN BLDV-MCNC: 80 MG/DL (ref 7–22)
CALCIUM SERPL-MCNC: 10.2 MG/DL (ref 8.5–10.5)
CHLORIDE BLD-SCNC: 108 MEQ/L (ref 98–111)
CO2: 21 MEQ/L (ref 23–33)
CREAT SERPL-MCNC: 2 MG/DL (ref 0.4–1.2)
GFR SERPL CREATININE-BSD FRML MDRD: 25 ML/MIN/1.73M2
GLUCOSE BLD-MCNC: 122 MG/DL (ref 70–108)
MAGNESIUM: 1.7 MG/DL (ref 1.6–2.4)
PHOSPHORUS: 4 MG/DL (ref 2.4–4.7)
POTASSIUM SERPL-SCNC: 3.7 MEQ/L (ref 3.5–5.2)
SODIUM BLD-SCNC: 144 MEQ/L (ref 135–145)

## 2022-01-17 ENCOUNTER — HOSPITAL ENCOUNTER (OUTPATIENT)
Dept: INTERVENTIONAL RADIOLOGY/VASCULAR | Age: 72
Discharge: HOME | End: 2022-01-17
Attending: INTERNAL MEDICINE
Payer: MEDICARE

## 2022-01-17 LAB
ANION GAP SERPL CALCULATED.3IONS-SCNC: 16 MEQ/L (ref 8–16)
BUN BLDV-MCNC: 79 MG/DL (ref 7–22)
CALCIUM SERPL-MCNC: 10.2 MG/DL (ref 8.5–10.5)
CHLORIDE BLD-SCNC: 107 MEQ/L (ref 98–111)
CO2: 19 MEQ/L (ref 23–33)
CREAT SERPL-MCNC: 1.8 MG/DL (ref 0.4–1.2)
GFR SERPL CREATININE-BSD FRML MDRD: 28 ML/MIN/1.73M2
GLUCOSE BLD-MCNC: 124 MG/DL (ref 70–108)
POTASSIUM SERPL-SCNC: 3.8 MEQ/L (ref 3.5–5.2)
SODIUM BLD-SCNC: 142 MEQ/L (ref 135–145)

## 2022-01-17 PROCEDURE — 6370000000 HC RX 637 (ALT 250 FOR IP): Performed by: RADIOLOGY

## 2022-01-17 PROCEDURE — 36589 REMOVAL TUNNELED CV CATH: CPT

## 2022-01-17 RX ORDER — BACITRACIN, NEOMYCIN, POLYMYXIN B 400; 3.5; 5 [USP'U]/G; MG/G; [USP'U]/G
OINTMENT TOPICAL ONCE
Status: COMPLETED | OUTPATIENT
Start: 2022-01-17 | End: 2022-01-17

## 2022-01-17 RX ADMIN — BACITRACIN, NEOMYCIN, POLYMYXIN B 1 G: 400; 3.5; 5 OINTMENT TOPICAL at 14:33

## 2022-01-18 LAB
ABO CHECK: NORMAL
ANION GAP SERPL CALCULATED.3IONS-SCNC: 15 MEQ/L (ref 8–16)
ANISOCYTOSIS: PRESENT
BASOPHILIA: ABNORMAL
BASOPHILIC STIPPLING: ABNORMAL
BASOPHILS # BLD: 0.5 %
BASOPHILS ABSOLUTE: 0.1 THOU/MM3 (ref 0–0.1)
BUN BLDV-MCNC: 74 MG/DL (ref 7–22)
CALCIUM SERPL-MCNC: 9.8 MG/DL (ref 8.5–10.5)
CHLORIDE BLD-SCNC: 102 MEQ/L (ref 98–111)
CO2: 20 MEQ/L (ref 23–33)
CREAT SERPL-MCNC: 1.9 MG/DL (ref 0.4–1.2)
DAT IGG: NORMAL
DIFFERENTIAL TYPE: ABNORMAL
DIRECT COOMBS: NORMAL
EOSINOPHIL # BLD: 2.5 %
EOSINOPHILS ABSOLUTE: 0.3 THOU/MM3 (ref 0–0.4)
ERYTHROCYTE [DISTWIDTH] IN BLOOD BY AUTOMATED COUNT: 19.9 % (ref 11.5–14.5)
ERYTHROCYTE [DISTWIDTH] IN BLOOD BY AUTOMATED COUNT: 75.7 FL (ref 35–45)
GFR SERPL CREATININE-BSD FRML MDRD: 26 ML/MIN/1.73M2
GLUCOSE BLD-MCNC: 130 MG/DL (ref 70–108)
HCT VFR BLD CALC: 23 % (ref 37–47)
HCT VFR BLD CALC: 23 % (ref 37–47)
HEMOGLOBIN: 6.6 GM/DL (ref 12–16)
HEMOGLOBIN: 6.7 GM/DL (ref 12–16)
HYPOCHROMIA: PRESENT
IMMATURE GRANS (ABS): 0.15 THOU/MM3 (ref 0–0.07)
IMMATURE GRANULOCYTES: 1.1 %
LYMPHOCYTES # BLD: 6.4 %
LYMPHOCYTES ABSOLUTE: 0.9 THOU/MM3 (ref 1–4.8)
MCH RBC QN AUTO: 30.1 PG (ref 26–33)
MCHC RBC AUTO-ENTMCNC: 28.7 GM/DL (ref 32.2–35.5)
MCV RBC AUTO: 105 FL (ref 81–99)
MONOCYTES # BLD: 8.6 %
MONOCYTES ABSOLUTE: 1.2 THOU/MM3 (ref 0.4–1.3)
NUCLEATED RED BLOOD CELLS: 0 /100 WBC
PATHOLOGIST REVIEW: ABNORMAL
PLATELET # BLD: 239 THOU/MM3 (ref 130–400)
PLATELET ESTIMATE: ADEQUATE
PMV BLD AUTO: 8.7 FL (ref 9.4–12.4)
POTASSIUM SERPL-SCNC: 3.8 MEQ/L (ref 3.5–5.2)
RBC # BLD: 2.19 MILL/MM3 (ref 4.2–5.4)
RH FACTOR: NORMAL
SCAN OF BLOOD SMEAR: NORMAL
SEG NEUTROPHILS: 80.9 %
SEGMENTED NEUTROPHILS ABSOLUTE COUNT: 10.9 THOU/MM3 (ref 1.8–7.7)
SODIUM BLD-SCNC: 137 MEQ/L (ref 135–145)
WBC # BLD: 13.5 THOU/MM3 (ref 4.8–10.8)

## 2022-01-19 ENCOUNTER — APPOINTMENT (OUTPATIENT)
Dept: GENERAL RADIOLOGY | Age: 72
End: 2022-01-19
Attending: INTERNAL MEDICINE
Payer: COMMERCIAL

## 2022-01-19 LAB
ANION GAP SERPL CALCULATED.3IONS-SCNC: 12 MEQ/L (ref 8–16)
ANISOCYTOSIS: PRESENT
BACTERIA: ABNORMAL
BACTERIA: ABNORMAL /HPF
BASOPHILS # BLD: 0.3 %
BASOPHILS ABSOLUTE: 0.1 THOU/MM3 (ref 0–0.1)
BILIRUBIN URINE: ABNORMAL
BILIRUBIN URINE: NEGATIVE
BLOOD, URINE: ABNORMAL
BLOOD, URINE: ABNORMAL
BUN BLDV-MCNC: 75 MG/DL (ref 7–22)
CALCIUM SERPL-MCNC: 9.8 MG/DL (ref 8.5–10.5)
CASTS UA: ABNORMAL /LPF
CASTS: ABNORMAL /LPF
CHARACTER, URINE: ABNORMAL
CHARACTER, URINE: ABNORMAL
CHLORIDE BLD-SCNC: 100 MEQ/L (ref 98–111)
CO2: 22 MEQ/L (ref 23–33)
COLOR: ABNORMAL
COLOR: YELLOW
CREAT SERPL-MCNC: 1.7 MG/DL (ref 0.4–1.2)
CRYSTALS, UA: ABNORMAL
CRYSTALS: ABNORMAL
EOSINOPHIL # BLD: 1.1 %
EOSINOPHILS ABSOLUTE: 0.2 THOU/MM3 (ref 0–0.4)
EPITHELIAL CELLS, UA: ABNORMAL /HPF
EPITHELIAL CELLS, UA: ABNORMAL /HPF
ERYTHROCYTE [DISTWIDTH] IN BLOOD BY AUTOMATED COUNT: 19.5 % (ref 11.5–14.5)
ERYTHROCYTE [DISTWIDTH] IN BLOOD BY AUTOMATED COUNT: 73.3 FL (ref 35–45)
GFR SERPL CREATININE-BSD FRML MDRD: 30 ML/MIN/1.73M2
GLUCOSE BLD-MCNC: 147 MG/DL (ref 70–108)
GLUCOSE URINE: NEGATIVE MG/DL
GLUCOSE, URINE: NEGATIVE MG/DL
HCT VFR BLD CALC: 24.3 % (ref 37–47)
HEMOGLOBIN: 7.1 GM/DL (ref 12–16)
ICTOTEST: NEGATIVE
IMMATURE GRANS (ABS): 0.31 THOU/MM3 (ref 0–0.07)
IMMATURE GRANULOCYTES: 1.4 %
KETONES, URINE: NEGATIVE
KETONES, URINE: NEGATIVE
LEUKOCYTE EST, POC: ABNORMAL
LEUKOCYTE ESTERASE, URINE: ABNORMAL
LYMPHOCYTES # BLD: 3.6 %
LYMPHOCYTES ABSOLUTE: 0.8 THOU/MM3 (ref 1–4.8)
MCH RBC QN AUTO: 30.2 PG (ref 26–33)
MCHC RBC AUTO-ENTMCNC: 29.2 GM/DL (ref 32.2–35.5)
MCV RBC AUTO: 103.4 FL (ref 81–99)
MISCELLANEOUS 2: ABNORMAL
MONOCYTES # BLD: 8.6 %
MONOCYTES ABSOLUTE: 1.9 THOU/MM3 (ref 0.4–1.3)
MUCUS: ABNORMAL
MUCUS: ABNORMAL
NITRITE, URINE: POSITIVE
NITRITE, URINE: POSITIVE
NUCLEATED RED BLOOD CELLS: 0 /100 WBC
PH UA: 5.5 (ref 5–9)
PH UA: 7 (ref 5–9)
PLATELET # BLD: 284 THOU/MM3 (ref 130–400)
PMV BLD AUTO: 9.2 FL (ref 9.4–12.4)
POTASSIUM SERPL-SCNC: 3.8 MEQ/L (ref 3.5–5.2)
PROTEIN UA: 100
PROTEIN UA: >= 300 MG/DL
RBC # BLD: 2.35 MILL/MM3 (ref 4.2–5.4)
RBC URINE: > 200 /HPF
RBC URINE: ABNORMAL /HPF
RENAL EPITHELIAL, UA: ABNORMAL
SEG NEUTROPHILS: 85 %
SEGMENTED NEUTROPHILS ABSOLUTE COUNT: 18.9 THOU/MM3 (ref 1.8–7.7)
SELECTED GEL ANTIBODY SCREEN: NORMAL
SODIUM BLD-SCNC: 134 MEQ/L (ref 135–145)
SPECIFIC GRAVITY UA: 1.02 (ref 1–1.03)
SPECIFIC GRAVITY, URINE: 1.01 (ref 1–1.03)
TROPONIN T: 0.18 NG/ML
UROBILINOGEN, URINE: 0.2 EU/DL (ref 0–1)
UROBILINOGEN, URINE: 1 EU/DL (ref 0–1)
WBC # BLD: 22.2 THOU/MM3 (ref 4.8–10.8)
WBC UA: > 200 /HPF
WBC UA: ABNORMAL /HPF
YEAST: ABNORMAL

## 2022-01-19 PROCEDURE — 71045 X-RAY EXAM CHEST 1 VIEW: CPT

## 2022-01-20 ENCOUNTER — APPOINTMENT (OUTPATIENT)
Dept: CT IMAGING | Age: 72
DRG: 981 | End: 2022-01-20
Attending: INTERNAL MEDICINE
Payer: MEDICARE

## 2022-01-20 ENCOUNTER — APPOINTMENT (OUTPATIENT)
Dept: GENERAL RADIOLOGY | Age: 72
DRG: 981 | End: 2022-01-20
Attending: INTERNAL MEDICINE
Payer: MEDICARE

## 2022-01-20 ENCOUNTER — HOSPITAL ENCOUNTER (INPATIENT)
Age: 72
LOS: 15 days | Discharge: NURSING, REHAB OR LONG-TERM CARE | DRG: 981 | End: 2022-02-04
Attending: INTERNAL MEDICINE | Admitting: INTERNAL MEDICINE
Payer: MEDICARE

## 2022-01-20 ENCOUNTER — HOSPITAL ENCOUNTER (OUTPATIENT)
Dept: INTERVENTIONAL RADIOLOGY/VASCULAR | Age: 72
Discharge: HOME | DRG: 981 | End: 2022-01-20
Attending: INTERNAL MEDICINE
Payer: MEDICARE

## 2022-01-20 DIAGNOSIS — E79.0 ELEVATED BLOOD URIC ACID LEVEL: ICD-10-CM

## 2022-01-20 DIAGNOSIS — N17.9 AKI (ACUTE KIDNEY INJURY) (HCC): ICD-10-CM

## 2022-01-20 DIAGNOSIS — E87.0 HYPERNATREMIA: ICD-10-CM

## 2022-01-20 DIAGNOSIS — M10.079 ACUTE IDIOPATHIC GOUT OF FOOT, UNSPECIFIED LATERALITY: ICD-10-CM

## 2022-01-20 DIAGNOSIS — E87.5 HYPERKALEMIA: Primary | ICD-10-CM

## 2022-01-20 PROBLEM — J96.21 ACUTE ON CHRONIC RESPIRATORY FAILURE WITH HYPOXIA (HCC): Status: ACTIVE | Noted: 2022-01-20

## 2022-01-20 LAB
ALBUMIN SERPL-MCNC: 1.9 G/DL (ref 3.5–5.1)
ALBUMIN SERPL-MCNC: 2.2 G/DL (ref 3.5–5.1)
ALBUMIN SERPL-MCNC: 2.2 G/DL (ref 3.5–5.1)
ALLEN TEST: ABNORMAL
ALP BLD-CCNC: 108 U/L (ref 38–126)
ALP BLD-CCNC: 116 U/L (ref 38–126)
ALP BLD-CCNC: 119 U/L (ref 38–126)
ALT SERPL-CCNC: 14 U/L (ref 11–66)
ALT SERPL-CCNC: 15 U/L (ref 11–66)
ALT SERPL-CCNC: 15 U/L (ref 11–66)
AMMONIA: 19 UMOL/L (ref 11–60)
ANION GAP SERPL CALCULATED.3IONS-SCNC: 12 MEQ/L (ref 8–16)
ANION GAP SERPL CALCULATED.3IONS-SCNC: 13 MEQ/L (ref 8–16)
ANISOCYTOSIS: PRESENT
ANISOCYTOSIS: PRESENT
AST SERPL-CCNC: 18 U/L (ref 5–40)
AST SERPL-CCNC: 19 U/L (ref 5–40)
AST SERPL-CCNC: 20 U/L (ref 5–40)
BASE EXCESS (CALCULATED): -3.8 MMOL/L (ref -2.5–2.5)
BASE EXCESS (CALCULATED): -3.8 MMOL/L (ref -2.5–2.5)
BASOPHILIA: ABNORMAL
BASOPHILIC STIPPLING: ABNORMAL
BASOPHILS # BLD: 0.2 %
BASOPHILS # BLD: 0.4 %
BASOPHILS ABSOLUTE: 0.1 THOU/MM3 (ref 0–0.1)
BASOPHILS ABSOLUTE: 0.1 THOU/MM3 (ref 0–0.1)
BILIRUB SERPL-MCNC: 0.2 MG/DL (ref 0.3–1.2)
BILIRUBIN DIRECT: < 0.2 MG/DL (ref 0–0.3)
BUN BLDV-MCNC: 71 MG/DL (ref 7–22)
BUN BLDV-MCNC: 72 MG/DL (ref 7–22)
CALCIUM IONIZED: 1.27 MMOL/L (ref 1.12–1.32)
CALCIUM SERPL-MCNC: 9.4 MG/DL (ref 8.5–10.5)
CALCIUM SERPL-MCNC: 9.8 MG/DL (ref 8.5–10.5)
CHLORIDE BLD-SCNC: 100 MEQ/L (ref 98–111)
CHLORIDE BLD-SCNC: 102 MEQ/L (ref 98–111)
CO2: 20 MEQ/L (ref 23–33)
CO2: 22 MEQ/L (ref 23–33)
COLLECTED BY:: ABNORMAL
COLLECTED BY:: ABNORMAL
CREAT SERPL-MCNC: 1.7 MG/DL (ref 0.4–1.2)
CREAT SERPL-MCNC: 1.7 MG/DL (ref 0.4–1.2)
DEVICE: ABNORMAL
EOSINOPHIL # BLD: 3.2 %
EOSINOPHIL # BLD: 3.9 %
EOSINOPHILS ABSOLUTE: 0.9 THOU/MM3 (ref 0–0.4)
EOSINOPHILS ABSOLUTE: 0.9 THOU/MM3 (ref 0–0.4)
ERYTHROCYTE [DISTWIDTH] IN BLOOD BY AUTOMATED COUNT: 19.9 % (ref 11.5–14.5)
ERYTHROCYTE [DISTWIDTH] IN BLOOD BY AUTOMATED COUNT: 20.2 % (ref 11.5–14.5)
ERYTHROCYTE [DISTWIDTH] IN BLOOD BY AUTOMATED COUNT: 73.1 FL (ref 35–45)
ERYTHROCYTE [DISTWIDTH] IN BLOOD BY AUTOMATED COUNT: 74.6 FL (ref 35–45)
FERRITIN: 651 NG/ML (ref 10–291)
GFR SERPL CREATININE-BSD FRML MDRD: 30 ML/MIN/1.73M2
GFR SERPL CREATININE-BSD FRML MDRD: 30 ML/MIN/1.73M2
GLUCOSE BLD-MCNC: 102 MG/DL (ref 70–108)
GLUCOSE BLD-MCNC: 121 MG/DL (ref 70–108)
HCO3: 23 MMOL/L (ref 23–28)
HCO3: 24 MMOL/L (ref 23–28)
HCT VFR BLD CALC: 25.3 % (ref 37–47)
HCT VFR BLD CALC: 26.1 % (ref 37–47)
HEMOGLOBIN: 7.5 GM/DL (ref 12–16)
HEMOGLOBIN: 7.8 GM/DL (ref 12–16)
IFIO2: 30
IMMATURE GRANS (ABS): 0.26 THOU/MM3 (ref 0–0.07)
IMMATURE GRANS (ABS): 0.42 THOU/MM3 (ref 0–0.07)
IMMATURE GRANULOCYTES: 1.1 %
IMMATURE GRANULOCYTES: 1.6 %
IRON: 11 UG/DL (ref 50–170)
LYMPHOCYTES # BLD: 2.2 %
LYMPHOCYTES # BLD: 2.5 %
LYMPHOCYTES ABSOLUTE: 0.6 THOU/MM3 (ref 1–4.8)
LYMPHOCYTES ABSOLUTE: 0.6 THOU/MM3 (ref 1–4.8)
MCH RBC QN AUTO: 29.9 PG (ref 26–33)
MCH RBC QN AUTO: 30.2 PG (ref 26–33)
MCHC RBC AUTO-ENTMCNC: 29.6 GM/DL (ref 32.2–35.5)
MCHC RBC AUTO-ENTMCNC: 29.9 GM/DL (ref 32.2–35.5)
MCV RBC AUTO: 100.8 FL (ref 81–99)
MCV RBC AUTO: 101.2 FL (ref 81–99)
MONOCYTES # BLD: 6.6 %
MONOCYTES # BLD: 7.5 %
MONOCYTES ABSOLUTE: 1.8 THOU/MM3 (ref 0.4–1.3)
MONOCYTES ABSOLUTE: 1.8 THOU/MM3 (ref 0.4–1.3)
NUCLEATED RED BLOOD CELLS: 0 /100 WBC
NUCLEATED RED BLOOD CELLS: 0 /100 WBC
O2 SATURATION: 85 %
O2 SATURATION: 92 %
PCO2: 52 MMHG (ref 35–45)
PCO2: 56 MMHG (ref 35–45)
PH BLOOD GAS: 7.23 (ref 7.35–7.45)
PH BLOOD GAS: 7.26 (ref 7.35–7.45)
PLATELET # BLD: 297 THOU/MM3 (ref 130–400)
PLATELET # BLD: 307 THOU/MM3 (ref 130–400)
PLATELET ESTIMATE: ADEQUATE
PMV BLD AUTO: 8.7 FL (ref 9.4–12.4)
PMV BLD AUTO: 9 FL (ref 9.4–12.4)
PO2: 60 MMHG (ref 71–104)
PO2: 75 MMHG (ref 71–104)
POTASSIUM SERPL-SCNC: 3.7 MEQ/L (ref 3.5–5.2)
POTASSIUM SERPL-SCNC: 3.7 MEQ/L (ref 3.5–5.2)
PROCALCITONIN: 16.54 NG/ML (ref 0.01–0.09)
RBC # BLD: 2.51 MILL/MM3 (ref 4.2–5.4)
RBC # BLD: 2.58 MILL/MM3 (ref 4.2–5.4)
SCAN OF BLOOD SMEAR: NORMAL
SEG NEUTROPHILS: 84.6 %
SEG NEUTROPHILS: 86.2 %
SEGMENTED NEUTROPHILS ABSOLUTE COUNT: 20.6 THOU/MM3 (ref 1.8–7.7)
SEGMENTED NEUTROPHILS ABSOLUTE COUNT: 23 THOU/MM3 (ref 1.8–7.7)
SET PEEP: 5 MMHG
SET PRESS SUPP: 12 CMH2O
SET RESPIRATORY RATE: 16 BPM
SODIUM BLD-SCNC: 134 MEQ/L (ref 135–145)
SODIUM BLD-SCNC: 135 MEQ/L (ref 135–145)
SOURCE, BLOOD GAS: ABNORMAL
TOTAL IRON BINDING CAPACITY: 127 UG/DL (ref 171–450)
TOTAL PROTEIN: 5.4 G/DL (ref 6.1–8)
TOTAL PROTEIN: 5.5 G/DL (ref 6.1–8)
TOTAL PROTEIN: 6.1 G/DL (ref 6.1–8)
TROPONIN T: 0.15 NG/ML
VITAMIN B-12: 936 PG/ML (ref 211–911)
WBC # BLD: 24.3 THOU/MM3 (ref 4.8–10.8)
WBC # BLD: 26.7 THOU/MM3 (ref 4.8–10.8)

## 2022-01-20 PROCEDURE — 36569 INSJ PICC 5 YR+ W/O IMAGING: CPT

## 2022-01-20 PROCEDURE — 83880 ASSAY OF NATRIURETIC PEPTIDE: CPT

## 2022-01-20 PROCEDURE — 93005 ELECTROCARDIOGRAM TRACING: CPT | Performed by: NURSE PRACTITIONER

## 2022-01-20 PROCEDURE — 76937 US GUIDE VASCULAR ACCESS: CPT

## 2022-01-20 PROCEDURE — 82330 ASSAY OF CALCIUM: CPT

## 2022-01-20 PROCEDURE — 77001 FLUOROGUIDE FOR VEIN DEVICE: CPT

## 2022-01-20 PROCEDURE — 81001 URINALYSIS AUTO W/SCOPE: CPT

## 2022-01-20 PROCEDURE — 36556 INSERT NON-TUNNEL CV CATH: CPT

## 2022-01-20 PROCEDURE — 71250 CT THORAX DX C-: CPT

## 2022-01-20 PROCEDURE — 80053 COMPREHEN METABOLIC PANEL: CPT

## 2022-01-20 PROCEDURE — 71045 X-RAY EXAM CHEST 1 VIEW: CPT

## 2022-01-20 PROCEDURE — 2700000000 HC OXYGEN THERAPY PER DAY

## 2022-01-20 PROCEDURE — 87186 SC STD MICRODIL/AGAR DIL: CPT

## 2022-01-20 PROCEDURE — 85025 COMPLETE CBC W/AUTO DIFF WBC: CPT

## 2022-01-20 PROCEDURE — 84145 PROCALCITONIN (PCT): CPT

## 2022-01-20 PROCEDURE — P9016 RBC LEUKOCYTES REDUCED: HCPCS

## 2022-01-20 PROCEDURE — 87641 MR-STAPH DNA AMP PROBE: CPT

## 2022-01-20 PROCEDURE — 87500 VANOMYCIN DNA AMP PROBE: CPT

## 2022-01-20 PROCEDURE — 74176 CT ABD & PELVIS W/O CONTRAST: CPT

## 2022-01-20 PROCEDURE — 87086 URINE CULTURE/COLONY COUNT: CPT

## 2022-01-20 PROCEDURE — 87077 CULTURE AEROBIC IDENTIFY: CPT

## 2022-01-20 PROCEDURE — 83605 ASSAY OF LACTIC ACID: CPT

## 2022-01-20 PROCEDURE — 2709999900 IR FLUORO GUIDED CVA DEVICE PLMT/REPLACE/REMOVAL

## 2022-01-20 PROCEDURE — 2000000000 HC ICU R&B

## 2022-01-20 PROCEDURE — 87081 CULTURE SCREEN ONLY: CPT

## 2022-01-20 PROCEDURE — APPNB180 APP NON BILLABLE TIME > 60 MINS: Performed by: NURSE PRACTITIONER

## 2022-01-20 PROCEDURE — 02H633Z INSERTION OF INFUSION DEVICE INTO RIGHT ATRIUM, PERCUTANEOUS APPROACH: ICD-10-PCS | Performed by: SURGERY

## 2022-01-20 RX ORDER — METOCLOPRAMIDE HYDROCHLORIDE 5 MG/5ML
5 SOLUTION ORAL EVERY 8 HOURS
COMMUNITY
End: 2022-04-07

## 2022-01-20 RX ORDER — ONDANSETRON 4 MG/1
4 TABLET, ORALLY DISINTEGRATING ORAL EVERY 8 HOURS PRN
Status: DISCONTINUED | OUTPATIENT
Start: 2022-01-20 | End: 2022-02-04 | Stop reason: HOSPADM

## 2022-01-20 RX ORDER — SODIUM CHLORIDE 9 MG/ML
25 INJECTION, SOLUTION INTRAVENOUS PRN
Status: DISCONTINUED | OUTPATIENT
Start: 2022-01-20 | End: 2022-01-21

## 2022-01-20 RX ORDER — SODIUM CHLORIDE 0.9 % (FLUSH) 0.9 %
5-40 SYRINGE (ML) INJECTION EVERY 12 HOURS SCHEDULED
Status: DISCONTINUED | OUTPATIENT
Start: 2022-01-21 | End: 2022-01-20

## 2022-01-20 RX ORDER — SODIUM CHLORIDE 0.9 % (FLUSH) 0.9 %
5-40 SYRINGE (ML) INJECTION EVERY 12 HOURS SCHEDULED
Status: DISCONTINUED | OUTPATIENT
Start: 2022-01-21 | End: 2022-02-04 | Stop reason: HOSPADM

## 2022-01-20 RX ORDER — FAMOTIDINE 20 MG/1
20 TABLET, FILM COATED ORAL DAILY
COMMUNITY

## 2022-01-20 RX ORDER — ACETAMINOPHEN 650 MG/1
650 SUPPOSITORY RECTAL EVERY 6 HOURS PRN
Status: DISCONTINUED | OUTPATIENT
Start: 2022-01-20 | End: 2022-02-04 | Stop reason: HOSPADM

## 2022-01-20 RX ORDER — MIDODRINE HYDROCHLORIDE 5 MG/1
10 TABLET ORAL 3 TIMES DAILY
Status: ON HOLD | COMMUNITY
End: 2022-03-15 | Stop reason: HOSPADM

## 2022-01-20 RX ORDER — HYDROXYZINE HYDROCHLORIDE 25 MG/1
25 TABLET, FILM COATED ORAL EVERY 8 HOURS PRN
Status: ON HOLD | COMMUNITY
End: 2022-04-12 | Stop reason: ALTCHOICE

## 2022-01-20 RX ORDER — POLYETHYLENE GLYCOL 3350 17 G/17G
17 POWDER, FOR SOLUTION ORAL DAILY PRN
Status: DISCONTINUED | OUTPATIENT
Start: 2022-01-20 | End: 2022-02-04 | Stop reason: HOSPADM

## 2022-01-20 RX ORDER — SODIUM CHLORIDE 0.9 % (FLUSH) 0.9 %
5-40 SYRINGE (ML) INJECTION PRN
Status: DISCONTINUED | OUTPATIENT
Start: 2022-01-20 | End: 2022-02-04 | Stop reason: SDUPTHER

## 2022-01-20 RX ORDER — ONDANSETRON 2 MG/ML
4 INJECTION INTRAMUSCULAR; INTRAVENOUS EVERY 6 HOURS PRN
Status: DISCONTINUED | OUTPATIENT
Start: 2022-01-20 | End: 2022-02-04 | Stop reason: HOSPADM

## 2022-01-20 RX ORDER — SODIUM CHLORIDE 0.9 % (FLUSH) 0.9 %
5-40 SYRINGE (ML) INJECTION PRN
Status: DISCONTINUED | OUTPATIENT
Start: 2022-01-20 | End: 2022-02-04 | Stop reason: HOSPADM

## 2022-01-20 RX ORDER — ACETAMINOPHEN 325 MG/1
650 TABLET ORAL EVERY 6 HOURS PRN
Status: DISCONTINUED | OUTPATIENT
Start: 2022-01-20 | End: 2022-02-04 | Stop reason: HOSPADM

## 2022-01-20 RX ORDER — SENNOSIDES 8.8 MG/5ML
5 LIQUID ORAL NIGHTLY PRN
COMMUNITY

## 2022-01-20 NOTE — PROGRESS NOTES
1621 Pt arrived to special procedure room 2 for placement of central line. PTA (1/20/22 1440) This procedure has been fully reviewed with the patient and written informed consent has been obtained. 1626 Patient assisted to table in supine position. Monitor attached to patient. Comfort ensured. 1628 Pre-procedure images obtained. 1631 Procedure begins. 1645 Procedure complete. Post-procedure images obtained. 1652 Portable monitor remains on patient. Patient assisted to bed in supine position. Comfort ensured. 1653 Patient transported to [place] [address] in stable condition. Primary nurse and Respiratory Therapist at bedside.

## 2022-01-20 NOTE — H&P
Formulation and discussion of sedation / procedure plans, risks, benefits, side effects and alternatives with patient and/or responsible adult completed.     Electronically signed by Giovanna Wheeler MD on 1/20/2022 at 4:45 PM

## 2022-01-20 NOTE — OP NOTE
Department of Radiology  Post Procedure Progress Note      Pre-Procedure Diagnosis:  Sepsis, renal failure, poor IV access    Procedure Performed:  PICC placement    Anesthesia: local     Findings: successful    Immediate Complications:  None    Estimated Blood Loss: minimal    SEE DICTATED PROCEDURE NOTE FOR COMPLETE DETAILS.     Electronically signed by Guanaco Hernandez MD on 1/20/2022 at 4:46 PM

## 2022-01-21 ENCOUNTER — APPOINTMENT (OUTPATIENT)
Dept: ULTRASOUND IMAGING | Age: 72
DRG: 981 | End: 2022-01-21
Attending: INTERNAL MEDICINE
Payer: MEDICARE

## 2022-01-21 ENCOUNTER — APPOINTMENT (OUTPATIENT)
Dept: INTERVENTIONAL RADIOLOGY/VASCULAR | Age: 72
DRG: 981 | End: 2022-01-21
Attending: INTERNAL MEDICINE
Payer: MEDICARE

## 2022-01-21 PROBLEM — K66.1 RETROPERITONEAL HEMATOMA: Status: ACTIVE | Noted: 2022-01-21

## 2022-01-21 PROBLEM — K68.3 RETROPERITONEAL HEMATOMA: Status: ACTIVE | Noted: 2022-01-21

## 2022-01-21 LAB
ABO CHECK: NORMAL
ABSOLUTE RETIC #: 141 THOU/MM3 (ref 20–115)
ACT TEG: 105 SECONDS (ref 86–118)
ALLEN TEST: POSITIVE
ANGLE, RAPID TEG: 80.6 DEG (ref 64–80)
ANION GAP SERPL CALCULATED.3IONS-SCNC: 11 MEQ/L (ref 8–16)
ANION GAP SERPL CALCULATED.3IONS-SCNC: 12 MEQ/L (ref 8–16)
ANION GAP SERPL CALCULATED.3IONS-SCNC: 13 MEQ/L (ref 8–16)
BACTERIA: ABNORMAL
BASE EXCESS (CALCULATED): -3.1 MMOL/L (ref -2.5–2.5)
BASE EXCESS MIXED: -3 MMOL/L (ref -2–3)
BASE EXCESS MIXED: -4.5 MMOL/L (ref -2–3)
BASE EXCESS MIXED: -4.8 MMOL/L (ref -2–3)
BILIRUBIN URINE: NEGATIVE
BLOOD, URINE: ABNORMAL
BUN BLDV-MCNC: 66 MG/DL (ref 7–22)
BUN BLDV-MCNC: 71 MG/DL (ref 7–22)
BUN BLDV-MCNC: 72 MG/DL (ref 7–22)
CALCIUM IONIZED SERUM: 1.4 MMOL/L (ref 1.12–1.32)
CALCIUM SERPL-MCNC: 10 MG/DL (ref 8.5–10.5)
CALCIUM SERPL-MCNC: 8.9 MG/DL (ref 8.5–10.5)
CALCIUM SERPL-MCNC: 9.7 MG/DL (ref 8.5–10.5)
CASTS: ABNORMAL /LPF
CASTS: ABNORMAL /LPF
CHARACTER, URINE: CLEAR
CHLORIDE BLD-SCNC: 100 MEQ/L (ref 98–111)
CHLORIDE BLD-SCNC: 102 MEQ/L (ref 98–111)
CHLORIDE BLD-SCNC: 104 MEQ/L (ref 98–111)
CHLORIDE, WHOLE BLOOD: 104 MEQ/L (ref 98–109)
CO2: 20 MEQ/L (ref 23–33)
CO2: 22 MEQ/L (ref 23–33)
CO2: 22 MEQ/L (ref 23–33)
COLLECTED BY:: ABNORMAL
COLOR: YELLOW
CREAT SERPL-MCNC: 1.6 MG/DL (ref 0.4–1.2)
CREAT SERPL-MCNC: 1.7 MG/DL (ref 0.4–1.2)
CREAT SERPL-MCNC: 1.7 MG/DL (ref 0.4–1.2)
CRYSTALS: ABNORMAL
DEVICE: ABNORMAL
EKG ATRIAL RATE: 90 BPM
EKG P AXIS: 63 DEGREES
EKG P-R INTERVAL: 182 MS
EKG Q-T INTERVAL: 346 MS
EKG QRS DURATION: 96 MS
EKG QTC CALCULATION (BAZETT): 423 MS
EKG R AXIS: 73 DEGREES
EKG T AXIS: 41 DEGREES
EKG VENTRICULAR RATE: 90 BPM
EPITHELIAL CELLS, UA: ABNORMAL /HPF
EPL-TEG: 0 % (ref 0–15)
FIBRINOGEN: 611 MG/100ML (ref 155–475)
FIO2, MIXED VENOUS: 40
GFR SERPL CREATININE-BSD FRML MDRD: 30 ML/MIN/1.73M2
GFR SERPL CREATININE-BSD FRML MDRD: 30 ML/MIN/1.73M2
GFR SERPL CREATININE-BSD FRML MDRD: 32 ML/MIN/1.73M2
GLUCOSE BLD-MCNC: 105 MG/DL (ref 70–108)
GLUCOSE BLD-MCNC: 84 MG/DL (ref 70–108)
GLUCOSE BLD-MCNC: 97 MG/DL (ref 70–108)
GLUCOSE, URINE: NEGATIVE MG/DL
GLUCOSE, WHOLE BLOOD: 96 MG/DL (ref 70–108)
HCO3, MIXED: 23 MMOL/L (ref 23–28)
HCO3, MIXED: 24 MMOL/L (ref 23–28)
HCO3, MIXED: 24 MMOL/L (ref 23–28)
HCO3: 23 MMOL/L (ref 23–28)
HCT VFR BLD CALC: 24.5 % (ref 37–47)
HCT VFR BLD CALC: 24.8 % (ref 37–47)
HCT VFR BLD CALC: 25.8 % (ref 37–47)
HEMOGLOBIN: 7.4 GM/DL (ref 12–16)
HEMOGLOBIN: 7.5 GM/DL (ref 12–16)
HEMOGLOBIN: 7.9 GM/DL (ref 12–16)
HEPARIN THERAPY: NO
IFIO2: 40
IMMATURE RETIC FRACT: 31.6 % (ref 3–15.9)
INR BLD: 1.07 (ref 0.85–1.13)
KETONES, URINE: NEGATIVE
KINETICS RAPID TEG: 0.8 MINUTES (ref 0.5–2.3)
LACTIC ACID, SEPSIS: 0.4 MMOL/L (ref 0.5–1.9)
LACTIC ACID, SEPSIS: 0.5 MMOL/L (ref 0.5–1.9)
LEUKOCYTE EST, POC: ABNORMAL
LY30 (LYSIS) TEG: 0 % (ref 0–7.5)
MA(MAX CLOT) RAPID TEG: 80 MM (ref 52–71)
MAGNESIUM: 1.5 MG/DL (ref 1.6–2.4)
MAGNESIUM: 1.9 MG/DL (ref 1.6–2.4)
MISCELLANEOUS LAB TEST RESULT: ABNORMAL
MRSA SCREEN RT-PCR: NEGATIVE
NITRITE, URINE: NEGATIVE
O2 SAT, MIXED: 53 %
O2 SAT, MIXED: 55 %
O2 SAT, MIXED: 58 %
O2 SATURATION: 97 %
PCO2, MIXED VENOUS: 55 MMHG (ref 41–51)
PCO2, MIXED VENOUS: 57 MMHG (ref 41–51)
PCO2, MIXED VENOUS: 60 MMHG (ref 41–51)
PCO2: 43 MMHG (ref 35–45)
PH BLOOD GAS: 7.33 (ref 7.35–7.45)
PH UA: 5 (ref 5–9)
PH, MIXED: 7.21 (ref 7.31–7.41)
PH, MIXED: 7.22 (ref 7.31–7.41)
PH, MIXED: 7.25 (ref 7.31–7.41)
PO2 MIXED: 33 MMHG (ref 25–40)
PO2 MIXED: 36 MMHG (ref 25–40)
PO2 MIXED: 37 MMHG (ref 25–40)
PO2: 94 MMHG (ref 71–104)
POC CREATININE WHOLE BLOOD: 2 MG/DL (ref 0.5–1.2)
POC LACTIC ACID: < 0.4 MMOL/L (ref 0.5–1.9)
POTASSIUM SERPL-SCNC: 3.5 MEQ/L (ref 3.5–5.2)
POTASSIUM SERPL-SCNC: 3.6 MEQ/L (ref 3.5–5.2)
POTASSIUM SERPL-SCNC: 3.7 MEQ/L (ref 3.5–5.2)
POTASSIUM, WHOLE BLOOD: 3.7 MEQ/L (ref 3.5–4.9)
PRO-BNP: ABNORMAL PG/ML (ref 0–900)
PROTEIN UA: 30 MG/DL
RBC URINE: ABNORMAL /HPF
REACTION TIME RAPID TEG: 0.6 MINUTES (ref 0.4–1)
RENAL EPITHELIAL, UA: ABNORMAL
RETIC HEMOGLOBIN: 22.7 PG (ref 28.2–35.7)
RETICULOCYTE ABSOLUTE COUNT: 5.7 % (ref 0.5–2)
RH FACTOR: NORMAL
SITE: ABNORMAL
SODIUM BLD-SCNC: 135 MEQ/L (ref 135–145)
SODIUM BLD-SCNC: 135 MEQ/L (ref 135–145)
SODIUM BLD-SCNC: 136 MEQ/L (ref 135–145)
SODIUM, WHOLE BLOOD: 137 MEQ/L (ref 138–146)
SOURCE, BLOOD GAS: ABNORMAL
SPECIFIC GRAVITY UA: 1.01 (ref 1–1.03)
UROBILINOGEN, URINE: 0.2 EU/DL (ref 0–1)
VANCOMYCIN RESISTANT ENTEROCOCCUS: NEGATIVE
WBC UA: ABNORMAL /HPF
YEAST: ABNORMAL

## 2022-01-21 PROCEDURE — 84132 ASSAY OF SERUM POTASSIUM: CPT

## 2022-01-21 PROCEDURE — 93970 EXTREMITY STUDY: CPT

## 2022-01-21 PROCEDURE — 2580000003 HC RX 258: Performed by: NURSE PRACTITIONER

## 2022-01-21 PROCEDURE — 86885 COOMBS TEST INDIRECT QUAL: CPT

## 2022-01-21 PROCEDURE — 2500000003 HC RX 250 WO HCPCS: Performed by: NURSE PRACTITIONER

## 2022-01-21 PROCEDURE — 6360000002 HC RX W HCPCS: Performed by: NURSE PRACTITIONER

## 2022-01-21 PROCEDURE — 86922 COMPATIBILITY TEST ANTIGLOB: CPT

## 2022-01-21 PROCEDURE — 83735 ASSAY OF MAGNESIUM: CPT

## 2022-01-21 PROCEDURE — 82565 ASSAY OF CREATININE: CPT

## 2022-01-21 PROCEDURE — 36600 WITHDRAWAL OF ARTERIAL BLOOD: CPT

## 2022-01-21 PROCEDURE — 86900 BLOOD TYPING SEROLOGIC ABO: CPT

## 2022-01-21 PROCEDURE — 85385 FIBRINOGEN ANTIGEN: CPT

## 2022-01-21 PROCEDURE — 99222 1ST HOSP IP/OBS MODERATE 55: CPT | Performed by: UROLOGY

## 2022-01-21 PROCEDURE — 6370000000 HC RX 637 (ALT 250 FOR IP): Performed by: NURSE PRACTITIONER

## 2022-01-21 PROCEDURE — 83605 ASSAY OF LACTIC ACID: CPT

## 2022-01-21 PROCEDURE — 85014 HEMATOCRIT: CPT

## 2022-01-21 PROCEDURE — 82803 BLOOD GASES ANY COMBINATION: CPT

## 2022-01-21 PROCEDURE — 99233 SBSQ HOSP IP/OBS HIGH 50: CPT | Performed by: INTERNAL MEDICINE

## 2022-01-21 PROCEDURE — P9041 ALBUMIN (HUMAN),5%, 50ML: HCPCS | Performed by: STUDENT IN AN ORGANIZED HEALTH CARE EDUCATION/TRAINING PROGRAM

## 2022-01-21 PROCEDURE — 76705 ECHO EXAM OF ABDOMEN: CPT

## 2022-01-21 PROCEDURE — 82947 ASSAY GLUCOSE BLOOD QUANT: CPT

## 2022-01-21 PROCEDURE — 36430 TRANSFUSION BLD/BLD COMPNT: CPT

## 2022-01-21 PROCEDURE — 2500000003 HC RX 250 WO HCPCS: Performed by: STUDENT IN AN ORGANIZED HEALTH CARE EDUCATION/TRAINING PROGRAM

## 2022-01-21 PROCEDURE — 86901 BLOOD TYPING SEROLOGIC RH(D): CPT

## 2022-01-21 PROCEDURE — 92610 EVALUATE SWALLOWING FUNCTION: CPT

## 2022-01-21 PROCEDURE — P9047 ALBUMIN (HUMAN), 25%, 50ML: HCPCS | Performed by: NURSE PRACTITIONER

## 2022-01-21 PROCEDURE — 2580000003 HC RX 258: Performed by: RADIOLOGY

## 2022-01-21 PROCEDURE — 84295 ASSAY OF SERUM SODIUM: CPT

## 2022-01-21 PROCEDURE — 82435 ASSAY OF BLOOD CHLORIDE: CPT

## 2022-01-21 PROCEDURE — 85018 HEMOGLOBIN: CPT

## 2022-01-21 PROCEDURE — P9016 RBC LEUKOCYTES REDUCED: HCPCS

## 2022-01-21 PROCEDURE — 2700000000 HC OXYGEN THERAPY PER DAY

## 2022-01-21 PROCEDURE — 93010 ELECTROCARDIOGRAM REPORT: CPT | Performed by: NUCLEAR MEDICINE

## 2022-01-21 PROCEDURE — 82330 ASSAY OF CALCIUM: CPT

## 2022-01-21 PROCEDURE — 36415 COLL VENOUS BLD VENIPUNCTURE: CPT

## 2022-01-21 PROCEDURE — 6370000000 HC RX 637 (ALT 250 FOR IP): Performed by: STUDENT IN AN ORGANIZED HEALTH CARE EDUCATION/TRAINING PROGRAM

## 2022-01-21 PROCEDURE — 86905 BLOOD TYPING RBC ANTIGENS: CPT

## 2022-01-21 PROCEDURE — 94761 N-INVAS EAR/PLS OXIMETRY MLT: CPT

## 2022-01-21 PROCEDURE — 6360000002 HC RX W HCPCS: Performed by: STUDENT IN AN ORGANIZED HEALTH CARE EDUCATION/TRAINING PROGRAM

## 2022-01-21 PROCEDURE — 2000000000 HC ICU R&B

## 2022-01-21 PROCEDURE — 85610 PROTHROMBIN TIME: CPT

## 2022-01-21 PROCEDURE — 87040 BLOOD CULTURE FOR BACTERIA: CPT

## 2022-01-21 PROCEDURE — 2580000003 HC RX 258: Performed by: STUDENT IN AN ORGANIZED HEALTH CARE EDUCATION/TRAINING PROGRAM

## 2022-01-21 PROCEDURE — 80048 BASIC METABOLIC PNL TOTAL CA: CPT

## 2022-01-21 PROCEDURE — 93307 TTE W/O DOPPLER COMPLETE: CPT

## 2022-01-21 RX ORDER — ALLOPURINOL 100 MG/1
100 TABLET ORAL DAILY
COMMUNITY
End: 2022-03-28 | Stop reason: ALTCHOICE

## 2022-01-21 RX ORDER — FENTANYL CITRATE 50 UG/ML
50 INJECTION, SOLUTION INTRAMUSCULAR; INTRAVENOUS ONCE
Status: DISCONTINUED | OUTPATIENT
Start: 2022-01-21 | End: 2022-01-21

## 2022-01-21 RX ORDER — MULTIVITAMIN WITH IRON
1 TABLET ORAL DAILY
Status: DISCONTINUED | OUTPATIENT
Start: 2022-01-21 | End: 2022-01-23

## 2022-01-21 RX ORDER — MAGNESIUM SULFATE IN WATER 40 MG/ML
2000 INJECTION, SOLUTION INTRAVENOUS PRN
Status: DISCONTINUED | OUTPATIENT
Start: 2022-01-21 | End: 2022-02-04 | Stop reason: HOSPADM

## 2022-01-21 RX ORDER — MIDAZOLAM HYDROCHLORIDE 1 MG/ML
1 INJECTION INTRAMUSCULAR; INTRAVENOUS ONCE
Status: DISCONTINUED | OUTPATIENT
Start: 2022-01-21 | End: 2022-01-21

## 2022-01-21 RX ORDER — METOCLOPRAMIDE HYDROCHLORIDE 5 MG/5ML
5 SOLUTION ORAL EVERY 8 HOURS
Status: DISCONTINUED | OUTPATIENT
Start: 2022-01-21 | End: 2022-01-25

## 2022-01-21 RX ORDER — ALBUMIN (HUMAN) 12.5 G/50ML
25 SOLUTION INTRAVENOUS EVERY 6 HOURS
Status: DISCONTINUED | OUTPATIENT
Start: 2022-01-21 | End: 2022-01-21

## 2022-01-21 RX ORDER — SODIUM CHLORIDE 9 MG/ML
INJECTION, SOLUTION INTRAVENOUS PRN
Status: DISCONTINUED | OUTPATIENT
Start: 2022-01-21 | End: 2022-01-21

## 2022-01-21 RX ORDER — SENNOSIDES 8.8 MG/5ML
5 LIQUID ORAL NIGHTLY PRN
Status: DISCONTINUED | OUTPATIENT
Start: 2022-01-21 | End: 2022-02-04 | Stop reason: HOSPADM

## 2022-01-21 RX ORDER — FLUOXETINE HYDROCHLORIDE 20 MG/1
40 CAPSULE ORAL DAILY
Status: DISCONTINUED | OUTPATIENT
Start: 2022-01-21 | End: 2022-02-04 | Stop reason: HOSPADM

## 2022-01-21 RX ORDER — FAMOTIDINE 20 MG/1
20 TABLET, FILM COATED ORAL DAILY
Status: DISCONTINUED | OUTPATIENT
Start: 2022-01-21 | End: 2022-02-04 | Stop reason: HOSPADM

## 2022-01-21 RX ORDER — FERROUS SULFATE 325(65) MG
325 TABLET ORAL
Status: DISCONTINUED | OUTPATIENT
Start: 2022-01-21 | End: 2022-02-04 | Stop reason: HOSPADM

## 2022-01-21 RX ORDER — SODIUM HYPOCHLORITE 1.25 MG/ML
SOLUTION TOPICAL DAILY
Status: DISCONTINUED | OUTPATIENT
Start: 2022-01-21 | End: 2022-02-04 | Stop reason: HOSPADM

## 2022-01-21 RX ORDER — DOCUSATE SODIUM 100 MG/1
100 CAPSULE, LIQUID FILLED ORAL 2 TIMES DAILY PRN
Status: DISCONTINUED | OUTPATIENT
Start: 2022-01-21 | End: 2022-01-24

## 2022-01-21 RX ORDER — HYDROXYZINE HYDROCHLORIDE 25 MG/1
25 TABLET, FILM COATED ORAL EVERY 8 HOURS PRN
Status: DISCONTINUED | OUTPATIENT
Start: 2022-01-21 | End: 2022-02-04 | Stop reason: HOSPADM

## 2022-01-21 RX ORDER — SODIUM CHLORIDE 9 MG/ML
INJECTION, SOLUTION INTRAVENOUS CONTINUOUS
Status: DISCONTINUED | OUTPATIENT
Start: 2022-01-21 | End: 2022-01-29

## 2022-01-21 RX ORDER — ALBUMIN, HUMAN INJ 5% 5 %
25 SOLUTION INTRAVENOUS ONCE
Status: COMPLETED | OUTPATIENT
Start: 2022-01-21 | End: 2022-01-21

## 2022-01-21 RX ORDER — BUMETANIDE 0.25 MG/ML
0.5 INJECTION, SOLUTION INTRAMUSCULAR; INTRAVENOUS ONCE
Status: DISCONTINUED | OUTPATIENT
Start: 2022-01-21 | End: 2022-01-21

## 2022-01-21 RX ORDER — MIDODRINE HYDROCHLORIDE 10 MG/1
10 TABLET ORAL
Status: DISCONTINUED | OUTPATIENT
Start: 2022-01-21 | End: 2022-01-21

## 2022-01-21 RX ORDER — ALLOPURINOL 100 MG/1
100 TABLET ORAL DAILY
Status: DISCONTINUED | OUTPATIENT
Start: 2022-01-21 | End: 2022-02-04 | Stop reason: HOSPADM

## 2022-01-21 RX ORDER — SODIUM CHLORIDE 9 MG/ML
INJECTION, SOLUTION INTRAVENOUS PRN
Status: DISCONTINUED | OUTPATIENT
Start: 2022-01-21 | End: 2022-02-04 | Stop reason: HOSPADM

## 2022-01-21 RX ORDER — ASPIRIN 81 MG/1
81 TABLET, CHEWABLE ORAL DAILY
Status: DISCONTINUED | OUTPATIENT
Start: 2022-01-21 | End: 2022-02-04 | Stop reason: HOSPADM

## 2022-01-21 RX ORDER — POTASSIUM CHLORIDE 29.8 MG/ML
20 INJECTION INTRAVENOUS PRN
Status: DISCONTINUED | OUTPATIENT
Start: 2022-01-21 | End: 2022-02-04 | Stop reason: HOSPADM

## 2022-01-21 RX ORDER — NOREPINEPHRINE BIT/0.9 % NACL 16MG/250ML
2-100 INFUSION BOTTLE (ML) INTRAVENOUS CONTINUOUS
Status: DISCONTINUED | OUTPATIENT
Start: 2022-01-21 | End: 2022-01-26

## 2022-01-21 RX ORDER — SODIUM CHLORIDE 450 MG/100ML
INJECTION, SOLUTION INTRAVENOUS CONTINUOUS
Status: DISCONTINUED | OUTPATIENT
Start: 2022-01-21 | End: 2022-01-21

## 2022-01-21 RX ORDER — MIDODRINE HYDROCHLORIDE 10 MG/1
10 TABLET ORAL EVERY 8 HOURS
Status: DISCONTINUED | OUTPATIENT
Start: 2022-01-21 | End: 2022-01-22

## 2022-01-21 RX ADMIN — MIDODRINE HYDROCHLORIDE 10 MG: 10 TABLET ORAL at 12:25

## 2022-01-21 RX ADMIN — ACETAMINOPHEN 650 MG: 325 TABLET ORAL at 05:39

## 2022-01-21 RX ADMIN — MIDODRINE HYDROCHLORIDE 10 MG: 10 TABLET ORAL at 16:38

## 2022-01-21 RX ADMIN — BUMETANIDE 1 MG/HR: 0.25 INJECTION INTRAMUSCULAR; INTRAVENOUS at 02:46

## 2022-01-21 RX ADMIN — MAGNESIUM SULFATE HEPTAHYDRATE 2000 MG: 40 INJECTION, SOLUTION INTRAVENOUS at 05:38

## 2022-01-21 RX ADMIN — TRANEXAMIC ACID 1000 MG: 1 INJECTION, SOLUTION INTRAVENOUS at 16:48

## 2022-01-21 RX ADMIN — PIPERACILLIN AND TAZOBACTAM 3375 MG: 3; .375 INJECTION, POWDER, LYOPHILIZED, FOR SOLUTION INTRAVENOUS at 16:25

## 2022-01-21 RX ADMIN — ALBUMIN (HUMAN) 25 G: 0.25 INJECTION, SOLUTION INTRAVENOUS at 01:58

## 2022-01-21 RX ADMIN — METOPROLOL TARTRATE 12.5 MG: 25 TABLET, FILM COATED ORAL at 08:59

## 2022-01-21 RX ADMIN — CHLOROTHIAZIDE SODIUM 500 MG: 500 INJECTION, POWDER, LYOPHILIZED, FOR SOLUTION INTRAVENOUS at 22:28

## 2022-01-21 RX ADMIN — Medication 1 TABLET: at 08:58

## 2022-01-21 RX ADMIN — DAKIN'S SOLUTION 0.125% (QUARTER STRENGTH): 0.12 SOLUTION at 12:35

## 2022-01-21 RX ADMIN — ACETAMINOPHEN 650 MG: 325 TABLET ORAL at 12:26

## 2022-01-21 RX ADMIN — SODIUM CHLORIDE: 4.5 INJECTION, SOLUTION INTRAVENOUS at 01:51

## 2022-01-21 RX ADMIN — TRANEXAMIC ACID 1000 MG: 1 INJECTION, SOLUTION INTRAVENOUS at 16:37

## 2022-01-21 RX ADMIN — MIDODRINE HYDROCHLORIDE 10 MG: 10 TABLET ORAL at 08:58

## 2022-01-21 RX ADMIN — METOCLOPRAMIDE HYDROCHLORIDE 5 MG: 5 SOLUTION ORAL at 08:57

## 2022-01-21 RX ADMIN — FAMOTIDINE 20 MG: 20 TABLET, FILM COATED ORAL at 08:58

## 2022-01-21 RX ADMIN — MIDODRINE HYDROCHLORIDE 10 MG: 10 TABLET ORAL at 21:09

## 2022-01-21 RX ADMIN — SODIUM CHLORIDE, PRESERVATIVE FREE 10 ML: 5 INJECTION INTRAVENOUS at 08:59

## 2022-01-21 RX ADMIN — METOCLOPRAMIDE HYDROCHLORIDE 5 MG: 5 SOLUTION ORAL at 18:15

## 2022-01-21 RX ADMIN — BUMETANIDE 1 MG/HR: 0.25 INJECTION INTRAMUSCULAR; INTRAVENOUS at 21:22

## 2022-01-21 RX ADMIN — ALLOPURINOL 100 MG: 100 TABLET ORAL at 08:58

## 2022-01-21 RX ADMIN — CHLOROTHIAZIDE SODIUM 500 MG: 500 INJECTION, POWDER, LYOPHILIZED, FOR SOLUTION INTRAVENOUS at 20:28

## 2022-01-21 RX ADMIN — BUMETANIDE 0.5 MG/HR: 0.25 INJECTION INTRAMUSCULAR; INTRAVENOUS at 06:34

## 2022-01-21 RX ADMIN — BUMETANIDE 0.5 MG/HR: 0.25 INJECTION INTRAMUSCULAR; INTRAVENOUS at 19:46

## 2022-01-21 RX ADMIN — Medication 5 MCG/MIN: at 19:49

## 2022-01-21 RX ADMIN — PIPERACILLIN AND TAZOBACTAM 3375 MG: 3; .375 INJECTION, POWDER, LYOPHILIZED, FOR SOLUTION INTRAVENOUS at 21:05

## 2022-01-21 RX ADMIN — FLUOXETINE HYDROCHLORIDE 40 MG: 20 CAPSULE ORAL at 08:59

## 2022-01-21 RX ADMIN — ASPIRIN 81 MG CHEWABLE TABLET 81 MG: 81 TABLET CHEWABLE at 08:57

## 2022-01-21 RX ADMIN — ALBUMIN (HUMAN) 25 G: 0.25 INJECTION, SOLUTION INTRAVENOUS at 12:29

## 2022-01-21 RX ADMIN — SODIUM CHLORIDE: 9 INJECTION, SOLUTION INTRAVENOUS at 03:57

## 2022-01-21 RX ADMIN — FERROUS SULFATE TAB 325 MG (65 MG ELEMENTAL FE) 325 MG: 325 (65 FE) TAB at 16:38

## 2022-01-21 RX ADMIN — ALBUMIN (HUMAN) 25 G: 12.5 INJECTION, SOLUTION INTRAVENOUS at 13:22

## 2022-01-21 RX ADMIN — ACETAMINOPHEN 650 MG: 325 TABLET ORAL at 21:09

## 2022-01-21 RX ADMIN — PIPERACILLIN AND TAZOBACTAM 3375 MG: 3; .375 INJECTION, POWDER, LYOPHILIZED, FOR SOLUTION INTRAVENOUS at 06:32

## 2022-01-21 ASSESSMENT — PAIN DESCRIPTION - LOCATION: LOCATION: GENERALIZED

## 2022-01-21 ASSESSMENT — PAIN DESCRIPTION - PAIN TYPE: TYPE: CHRONIC PAIN

## 2022-01-21 ASSESSMENT — PAIN SCALES - GENERAL
PAINLEVEL_OUTOF10: 3
PAINLEVEL_OUTOF10: 0
PAINLEVEL_OUTOF10: 4
PAINLEVEL_OUTOF10: 2
PAINLEVEL_OUTOF10: 10

## 2022-01-21 NOTE — CARE COORDINATION
1/21/22, 2:29 PM EST    DISCHARGE PLANNING EVALUATION    Order received for placement. Fátima GARCIA states pt was admitted to ICU from [address]. Herbert Maciel states family is now interested in [facility] for rehab instead of returning to [address]. DOT spoke with sister Clara Norton, she confirmed wanting [facility], [facility] and states her second and third choice would be [facility], [facility] and [address] [place] [address]. DOT called Tiffany Castillo, they will need to check if facility is accepting pts at this time and will call SW back. DOT called  of [facility], they declined pt. SW called Rayne Stewart, referral given. DOT called Sonja back and left message for Basilia Lane to see if they have made a decision on accepting pts.

## 2022-01-21 NOTE — PROGRESS NOTES
Patient arrived to unit from [facility] via bed. Patient transferred to ICU bed and placed on continuous ICU bedside monitor. Patient admitted for sepsis. Vitals obtained. See flowsheets. Patient's IV access includes right IJ CVC, left hand. Current infusions and rates of infusion include IV's capped, no fluids runnning. Assessment completed by   Vinita Willis. Two nurse skin assessment completed by Vinita Willis and Yaya Brothers RN. See flowsheets for assessment details. Policies and procedures of ICU unable to be explained to patient at this time. Family member(s)/representative(s) present at time of admission include NA. Patient rights explained to family member(s)/representatives and patient, as able. Patient/patient's family member(s)/representative(s) N/A to have physician notified of their admission. All questions posed by patient's family member(s)/representative(s) and patient answered at this time.

## 2022-01-21 NOTE — PROGRESS NOTES
Comprehensive Nutrition Assessment    Type and Reason for Visit:  Initial,Consult (TF ordering and management)    Nutrition Recommendations/Plan:   Change EN goal to Nepro 30ml/hour  Bolus 1 2.5ounce liquid protein bottle daily   Free H20 per physiican - currently 30ml TID  Monitoring renal status for protein modular adjustment as appropriate  Flexiseal - recommend culturelle and ? Discontinuing reglan - MD to advise  Recommend SLP swallow evaluation as status allows     Nutrition Assessment:    Pt. nutritionally compromised AEB NPO, PEG, need for nutrition support, increased needs for wound healing. At risk for further nutrition compromise r/t admit with sepsis; previous [facility] admit 10/27 - 12/1/21 for pneumonia then [facility] [place] now back to [facility]; dysphagia d/t prolonged intubations - NPO at [facility], Hx HD (none since 12/23/21) and vent/trach (out now), wound debridements, nephrostomy tube, retroperitoneal hematoma and underlying medical condition CHF, obesity. Malnutrition Assessment:  Malnutrition Status:  No malnutrition    Context:  Chronic Illness     Findings of the 6 clinical characteristics of malnutrition:  Energy Intake:  No significant decrease in energy intake (EN at [place])  Weight Loss:  No significant weight loss     Body Fat Loss:  No significant body fat loss     Muscle Mass Loss:  No significant muscle mass loss    Fluid Accumulation:  1 - Mild     Strength:  Not Performed    Estimated Daily Nutrient Needs:  Energy (kcal):  ~1215 (15/kgm); Weight Used for Energy Requirements:  Admission (81kgm)     Protein (g):  50-84gms (1.2-2/kgm IBW) monitoring renal status ; Weight Used for Protein Requirements:  Ideal (42kgm)        Fluid (ml/day):  per physician;     Nutrition Related Findings:  .  Pt. And siblings seen at bedside; pt.  Has been tolerating EN at [place] per sister but they would like a SLP evaluation re: ability for po intake; discussed with RN today also; no dialysis since 12/23/21 per sister; meds include reglan, MVI, ATB, bumex, MgS04; glucose 105  BUN 72  Creatinine 1.6  K+ 3.6; flexiseal in place - loose stools at [facility] too per sister; TF started upon admit to [facility] 1/20 and tolerating well at 30ml/hour; wound consult pending    Wounds:  Pressure Injury,Stage IV (coccyx with possible osteomyelitis; debrided 8/16/21; unstageable right and left heels; nephrostomy tube)       Current Nutrition Therapies:    Current Tube Feeding (TF) Orders:  · Feeding Route: PEG  · Formula: Renal Formula (Nepro)  · Schedule: Continuous (goal of 30ml/hour)  · Additives/Modulars: Protein (1/21 bolus 1 2.5ounce liquid protein bottle daily)  · Water Flushes: 30ml every 8h per physician  · Goal TF & Flush Orders Provides: Nepro at 30ml/hour with 1 protein modular daily = 1378 kcals (1274 TF, 104 modular) , 84 gms protein (58 TF, 26 modular), 115gms cho, 18gms fiber, 613ml free H20 (523 TF, 90 MD flush) in 885ml total volume (720 TF, 90 MD flush, 75 modular)/24h    Anthropometric Measures:  · Height: 4' 9\" (144.8 cm)  · Current Body Weight: 178 lb (80.7 kg) (1/20 with +2 edema)   · Admission Body Weight: 178 lb (80.7 kg) (1/20 with +2 edema)    · Usual Body Weight: 168 lb (76.2 kg) (10/27/21; 178# 4/5/21)     · BMI: 38.5    ;  (IBW for Ht.  of 4'9\" is 93# or 42kgm)   · BMI Categories: Obese Class 2 (BMI 35.0 -39.9)       Nutrition Diagnosis:   · Inadequate oral intake related to swallowing difficulty as evidenced by NPO or clear liquid status due to medical condition,nutrition support - enteral nutrition      Nutrition Interventions:   Food and/or Nutrient Delivery:  Continue NPO,Continue Current Tube Feeding  Nutrition Education/Counseling:  Education initiated (1/21 spoke with pt. and siblings at bedside re: nutrition support plans)   Coordination of Nutrition Care:  Continue to monitor while inpatient    Goals:  EN to provide % nutrient needs until able to transition to oral diet during LOS Nutrition Monitoring and Evaluation:   Behavioral-Environmental Outcomes:  None Identified   Food/Nutrient Intake Outcomes:  Enteral Nutrition Intake/Tolerance  Physical Signs/Symptoms Outcomes:  Biochemical Data,Chewing or Swallowing,GI Status,Fluid Status or Edema,Hemodynamic Status,Nutrition Focused Physical Findings,Skin,Weight     Discharge Planning:     Too soon to determine     Electronically signed by Corrine Reed RD, LD on 1/21/22 at 1:53 PM EST    Contact: [phone]

## 2022-01-21 NOTE — CONSULTS
[address]  [address] [facility] [address]  Dept: [phone]  Loc: [phone]  Visit Date: 1/20/2022    Urology Consult Note    Reason for Consult:  update on transfer to the ICU, Positive for hematuria, and abdnormal CT of ABD/PELVIS results  Requesting Physician:  LANE Huff - MADELEINE    History Obtained From:  Patient, emr, nurse    Chief Complaint: acute resp failure    HISTORY OF PRESENT ILLNESS:                The patient is a 73 y.o. female with significant past medical history  [facility] ICU on 1/20/2022 with acute respiratory failure, hypoxia. Patient is currently on a BiPAP.     Patient has past medical history significant for everyday smoker, BARBER, PAF s/p cardioversion 11/3/2021, PAH grp 3, HFpEF-ECHO 10/2021 LV 60 to 65% with trace tricuspid regurg. RV with normal function, essential hypertension, depression, gout, osteoarthritis, renal calculi, thrombocytopenia, stage 3 sacral ulcer s/p wound ostomy. Recent [facility] hospitalization 10/27-12/1/2021 patient suffered a very lengthy hospitalization secondary to acute hypoxic and hypercapnic respiratory failure felt to be secondary to severe pneumonia with left-sided pleural effusion and repeated mucous plugging. Patient did require tracheostomy on 11/17/2021, and de cannulated 12/23/2021. Stage IV decubitus ulcer with suspected osteomyelitis, ALVIN likely ATN, ANCA associated Vasculitis, hydronephrosis secondary to left-sided staghorn calculi percutaneous tube was placed on 11/12/2021, bilateral pleural effusions felt to be transudative status post thoracentesis of 1 L on 12/1/2021. Transferred to [facility] 12/1/2021.      Per family noted decreased responsiveness and change in mental status over the last 2 days with vague complaint of back and arm pain. An EKG was completed on 1/19/2022 with no acute ST changes. And troponin evaluation revealed 0.176 and 0.155.   Patient became hypoxic and hypercapnic and was transitioned to BiPAP therapy. 1/20/2022 Dr. Malka Medina was concern with sepsis and requested patient to be transferred to the ICU for further management and care. Urology was consulted for update on transfer to the ICU, Positive for hematuria, and abdnormal CT of ABD/PELVIS results    IR exchanged neph tube 1/18/22    CT abd/pelvis 1/220/22 showed   Left perinephric and renal subcapsular hematomas       Loculated left retroperitoneal fluid, question hematoma versus abscess.       Anasarca pattern noted with body wall edema     Ua large blood, mod leuk.   Ux +proteus, possibly other gnb - awaiting final c/s    HGB 7.5    Past Medical History:        Diagnosis Date    CHF (congestive heart failure) ([facility])     Dr. Parish Cortes Depression     Gout attack     Hypertension     Osteoarthritis     Pulmonary artery hypertension ([address]     Nelia German-- Dr. Nadja Terry-- Dr. Parish Cortes Renal calculi     Dr. Lr Hair Thrombocytopenia [facility])      Past Surgical History:        Procedure Laterality Date    APPENDECTOMY  1958    BRONCHOSCOPY N/A 11/22/2021    BRONCHOSCOPY performed by Cheri Alvares MD at [address] 11/30/2021    BRONCHOSCOPY WITHOUT FLURO performed by Cheri Alvares MD at [address] N/A 11/24/2021    EGD PEG TUBE PLACEMENT performed by Ra Chatman MD at [facility]    IR [address]  11/26/2021    IR CHEST TUBE INSERTION 11/26/2021 [facility]    IR NEPHROSTOMY PERCUTANEOUS LEFT  11/1/2021    IR NEPHROSTOMY PERCUTANEOUS LEFT 11/1/2021 Edgar Saldivar MD [facility]    PRESSURE ULCER DEBRIDEMENT Right 8/6/2021    EXCISION RIGHT BUTTOCK WOUND AND CLOSURE performed by Martinez Hightower MD at [facility] History     Socioeconomic History    Marital status: Single     Spouse name: Not on file    Number of children: 0    Years of education: Not on file    Highest education level: Not on file   Occupational History    Not on file   Tobacco Use    Smoking status: Never Smoker    Smokeless tobacco: Never Used   Vaping Use    Vaping Use: Never used   Substance and Sexual Activity    Alcohol use: No    Drug use: No    Sexual activity: Not Currently   Other Topics Concern    Not on file   Social History Narrative    Not on file     Social Determinants of Health     Financial Resource Strain: Low Risk     Difficulty of Paying Living Expenses: Not very hard   Food Insecurity: No Food Insecurity    Worried About Running Out of Food in the Last Year: Never true    Traci of Food in the Last Year: Never true   Transportation Needs:     Lack of Transportation (Medical): Not on file    Lack of Transportation (Non-Medical):  Not on file   Physical Activity:     Days of Exercise per Week: Not on file    Minutes of Exercise per Session: Not on file   Stress:     Feeling of Stress : Not on file   Social Connections:     Frequency of Communication with Friends and Family: Not on file    Frequency of Social Gatherings with Friends and Family: Not on file    Attends Congregational Services: Not on file    Active Member of Clubs or Organizations: Not on file    Attends Club or Organization Meetings: Not on file    Marital Status: Not on file   Intimate Partner Violence:     Fear of Current or Ex-Partner: Not on file    Emotionally Abused: Not on file    Physically Abused: Not on file    Sexually Abused: Not on file   Housing Stability:     Unable to Pay for Housing in the Last Year: Not on file    Number of [place] in the Last Year: Not on file    Unstable Housing in the Last Year: Not on file       AL  Family History   Problem Relation Age of Onset    Diabetes Father     Arthritis Mother     COPD Mother     Diabetes Sister     Heart Disease Maternal Uncle     Breast Cancer Niece 36    Sleep Apnea Brother     Asthma Neg Hx     Birth Defects Neg Hx     Cancer Neg Hx     Depression Neg Hx     Early Death Neg Hx     Hearing Loss Neg Hx     High Blood Pressure Neg Hx     High Cholesterol Neg Hx     Kidney Disease Neg Hx     Learning Disabilities Neg Hx     Mental Illness Neg Hx     Mental Retardation Neg Hx     Miscarriages / Stillbirths Neg Hx     Stroke Neg Hx     Substance Abuse Neg Hx     Vision Loss Neg Hx     Other Neg Hx        Allergies:  No Known Allergies    ROS:  Unable to perform    PHYSICAL EXAM:  VITALS:  BP (!) 101/50   Pulse 96   Temp 97.7 °F (36.5 °C) (Bladder)   Resp 16   Ht 4' 9\" (1.448 m)   Wt 178 lb (80.7 kg)   SpO2 100%   BMI 38.52 kg/m² . Nursing note and vitals reviewed. Constitutional:    Alert, no acute distress and cooperative to examination with appropriate mood and affect. HEENT:   Head:         Normocephalic and atraumatic. Mouth/Throat:          Mucous membranes are normal.   Eyes:         EOM are normal. No scleral icterus. Nose:    The external appearance of the nose is normal  Ears: The ears appear normal to external inspection   Neck:         Supple, symmetrical, trachea midline, no adenopathy, thyroid symmetric, not enlarged and no tenderness. Cardiovascular:        Normal rate, regular rhythm, S1 S2 heart sounds. Pulmonary/Chest:       Chest symmetric with normal A/P diameter, no wheezes, rales, or rhonchi noted. Slightly diminshed Normal respiratory rate and rhthym. No use of accessory muscles. Abdominal:          Soft. No tenderness. Left neph draining tea colored urine. +peg tube   Genitalia:    Padilla catheter draining yellow urine  Musculoskeletal:    Normal range of motion. She exhibits no edema or tenderness of lower extremities. Extremities:    No cyanosis, clubbing, or edema present. Neurological:    Alert. No cranial nerve deficit. There are no focalizing motor or sensory deficits.     DATA:  CBC:   Lab Results   Component Value Date    WBC 26.7 01/20/2022    RBC 2.51 01/20/2022    RBC 4.15 01/09/2018    HGB 7.5 01/21/2022    HCT 24.5 01/21/2022    .8 01/20/2022    MCH 29.9 01/20/2022    MCHC 29.6 01/20/2022    RDW 12.9 01/09/2018     01/20/2022    MPV 8.7 01/20/2022     BMP:    Lab Results   Component Value Date     01/21/2022     01/21/2022    K 3.6 01/21/2022    K 3.7 01/21/2022    K 4.0 12/01/2021     01/21/2022    CO2 22 01/21/2022    BUN 72 01/21/2022    CREATININE 1.6 01/21/2022    CALCIUM 9.7 01/21/2022    LABGLOM 32 01/21/2022    GLUCOSE 105 01/21/2022    GLUCOSE 103 01/09/2018     BUN/Creatinine:    Lab Results   Component Value Date    BUN 72 01/21/2022    CREATININE 1.6 01/21/2022     Magnesium:    Lab Results   Component Value Date    MG 1.5 01/21/2022     Phosphorus:    Lab Results   Component Value Date    PHOS 4.0 01/16/2022     PT/INR:    Lab Results   Component Value Date    INR 1.07 01/21/2022     U/A:    Lab Results   Component Value Date    COLORU YELLOW 01/20/2022    PHUR 5.0 01/20/2022    LABCAST NONE SEEN 01/20/2022    LABCAST NONE SEEN 01/20/2022    WBCUA 50-75 01/20/2022    RBCUA  01/20/2022    MUCUS OBSCURED 01/19/2022    MUCUS NONE SEEN 01/19/2022    YEAST NONE SEEN 01/20/2022    BACTERIA NONE SEEN 01/20/2022    SPECGRAV 1.014 01/20/2022    LEUKOCYTESUR MODERATE 01/20/2022    LEUKOCYTESUR MODERATE 01/19/2022    UROBILINOGEN 0.2 01/20/2022    BILIRUBINUR NEGATIVE 01/20/2022    BLOODU LARGE 01/20/2022    GLUCOSEU NEGATIVE 01/19/2022    AMORPHOUS OBSCURED 08/23/2019       Imaging: The patient has had a CT Without Contrast which I have independently reviewed along with its accompanying report. The study demonstrates   Narrative   CT abdomen and pelvis without contrast       Comparison: None       Findings:       Degenerative change lumbar spine and hips. No acute bony abnormalities. Gastrostomy tube balloon mid stomach. Padilla catheter noted urinary bladder.       Liver and spleen are unremarkable. Cholelithiasis. Pancreas and adrenal glands are within normal limits.       Multiple left renal stones with left nephrostomy catheter. Subcapsular hematoma left kidney, not well assessed without contrast.   Retroperitoneal hematoma also noted along with perinephric blood. Posterior left perinephric fluid is somewhat loculated. Abscess cannot be excluded. Left kidney displaced anteriorly.       Right kidney unremarkable without stones or hydronephrosis.       No evidence for aortic aneurysm.       No free fluid or adenopathy is noted in the pelvis. No evidence for diverticulitis. Appendix not identified.       Question partial hysterectomy. No adnexal abnormalities.       Subcutaneous edema consistent with anasarca.           Impression   Impression:       Left perinephric and renal subcapsular hematomas       Loculated left retroperitoneal fluid, question hematoma versus abscess.       Anasarca pattern noted with body wall edema         IMPRESSION/Plan:   Left perinephric and renal subcapsular hematoma - discussed with IR, not enough fluid to place drain. If fever develops or conditions worsened will consider reimaging and possible needle aspiration. Left staghorn calculus - neph replaced 1/18/22, will plan to treat stone when medically optimized for surgery as outpatient  Hematuria - resolved, urine is yellow in seaman, tea colored in left neph  Anemia - HGB 6.9 was transfused, now 7.5  ALVIN - near baseline, CRT 1.6. Last HD 12/23, HD catheter removed 1/19/22  Sepsis - Ux with no growth prelim, on Zosyn at [facility].   Previous Ux +proteus    Will continue to follow    Imaging and chart reviewed with Dr Yaneth hollis for including us in the care of [address] - CNP, APRN  01/21/22 8:43 AM  Urology

## 2022-01-21 NOTE — H&P
CRITICAL CARE H+P NOTE      Patient:  Tabatha Garza    Unit/Bed:4D-07/007-A  YOB: 1950  MRN: [id number]   PCP: Sherrel Frankel, MD  Date of Admission: 1/20/2022  Chief Complaint:-Acute respiratory failure    Assessment and Plan:    1. Acute on chronic respiratory failure, hypoxia and hypercapnia: History of obstructive sleep apnea and COPD. Recently 11/2021 treated for bilateral multi organism pneumonia PCR was positive for MRSA colonization. Culture growing MSSA. Patient did undergo bronchoscopy and did receive clindamycin for coverage of PVL. Repeat cultures identified no growth. 1/20/2022- Family identifies concerns of lethargy over the past 2 days. No fever or cough noted. Concerns of possible recurrent left-sided pleural effusion. 1/20/2022 patient was transition from nasal cannula to BiPAP. Will attempt to transition to high flow nasal cannula and monitor. I will collaborate with Dr. Liliya Dela Cruz ?thoracentesis. 2. Sepsis: Positive for leukocytosis and elevated procalcitonin. Lactic acid is pending. Blood culture and urine is pending. Noted history of multidrug resistance. Patient was placed on Zosyn while at [facility] on 1/19/2022. Awaiting on labs at time of admission to [facility]. 3. Acute on chronic kidney injury, nonoliguric: (baseline Crea 0.7-1.5). Hopeful for recovery. Last hemodialysis treatment was on 12/23/2021. Temporary dialysis catheter was removed on 1/19/2021. Dose medications to GFR no nonsteroidals are to be given. May need nephrology input. 4. Acute decompensated pulmonary hypertension: History of [place] 141 group 3, with chart review and family discussion ? Riociquat was not restarted on transfer to [place] ?concern of bleeding (Last dose 12/1/2021). At time of admission to the ICU no shock noted. I am unable to obtain CVP, Calculated Lesley CO/CI 5.7/3.2. Positive for anasarca, 1/20/2022-CT of Chest + bilateral pleural effusion.  Weight gain of approximately 15#. 1/20/2022 TVK=[id number]. Bumex gtt has been started for volume overload. 1/20/2022 CT of ABD/PELVIS + Retroperitoneal hematoma and subcapsular hematoma will continue to place Riociquat on hold. If further decompensation occurs would need to consider inotropic support. 5. Left subcapsular hematoma left kidney, Retroperitoneal hematoma with perinephric blood: History of 1/18/2022 percutaneous nephrostomy tube was changed out.  1/20/2022 CT of ABD/PELVIS-very left perinephric fluid is somewhat loculated abscess cannot be excluded. Noted hemoglobin drop on 1/18/2022 Hgb-6.6-6.7-7.1 awaiting on PRBC transfusion however unable to administer or obtain secondary to antibodies. Current hemoglobin on 1/21/2022 is 7.5/24.5. Patient is hemodynamically stable. Patient does complain of pain at puncture site. Urology has been consulted. TEG is pending will monitor serial H&H's.  6. Left obstructive uropathy with a left staghorn calculus: Status post left percutaneous nephrostomy tube. IR had recently change this out on 1/18/2022. Positive for hematuria. 1/20/2022 CT of ABD/PELVIS-Posterior left perinephric fluid is somewhat loculated. Abscess cannot be excluded. Left kidney displaced anteriorly. Urology has been consulted. 7. Acute on chronic Anemia, macrocytic: 2019 CCATH-no CAD. Patient was recently evaluated by heme-onc while on [facility] 1/19/2022 per Dr. Rayne Méndez it was felt to be multifactorial likely secondary to chronic renal failure ? Patient was to receive 1 unit of packed red blood cells, however unable to obtain secondary to antibodies match. 1/20/2022 CT abdomen and pelvis identified subcapsular hematoma of left kidney and retroperitoneal hematoma. Will repeat H/H and monitor with target of 7. TEG is pending. Lovenox has been placed on hold. 8. Stage IV decubitus ulcer: Status post excision back placement 8/21. CT abdomen pelvis showed possible bony involvement with concern for osteomyelitis.   Patient was evaluated by general surgery on 11/17/2021. No indication for debridement at that time. Patient is a poor surgical candidate given multiple comorbidities malnutrition and poor wound healing. Patient was receiving Dakin's wet-to-dry dressing changes and followed by wound and ostomy care. 1/20/2022 CT abdomen pelvis is pending we will continue Dakin's at this time   9. Hematuria: Present via percutaneous nephrostomy tube. Urology to evaluate. 10. Atrial fibrillation, PAF: s/p cardioversion 11/2021- anticoagulation was discontinued because of persistent hematuria via the percutaneous nephrostomy tube. No recurrence. Amiodarone was stopped on 11/8/2021. 11. Dysphagia: Secondary to prolonged intubation. Status post PEG tube placement. 12. Cholelithiasis:   With dilated CBD. Noted on ultrasound 10/30/2021. 1/21/2021 CT abdomen and pelvis cholelithiasis was identified. We will repeat gallbladder ultrasound for further evaluation. 13. Severe malnutrition: We will continue tube feeding dietary to evaluate. 14. Gout: History, allopurinol will be continued  15. BARBER: History of noncompliance with CPAP  16. Chronic tobacco use: Smoking sensation  17. ANCA associated vasculitis: Will need renal biopsy for confirmation once stable. Nephrology following. Not a candidate for Rituxan/TPE secondary to existing infection. 18. Obesity: BMI 38.52    INITIAL H AND P AND ICU COURSE:  Louise Paul is a 82-year-old  female transferred to [facility] ICU on 1/20/2022 with acute respiratory failure, hypoxia. Patient is currently on a BiPAP. Patient has past medical history significant for everyday smoker, BARBER, PAF s/p cardioversion 11/3/2021, PAH grp 3, HFpEF-ECHO 10/2021 LV 60 to 65% with trace tricuspid regurg. RV with normal function, essential hypertension, depression, gout, osteoarthritis, renal calculi, thrombocytopenia, stage 3 sacral ulcer s/p wound ostomy.   Recent [facility] hospitalization 10/27-12/1/2021 patient suffered a very lengthy hospitalization secondary to acute hypoxic and hypercapnic respiratory failure felt to be secondary to severe pneumonia with left-sided pleural effusion and repeated mucous plugging. Patient did require tracheostomy on 2021, and de cannulated 2021. Stage IV decubitus ulcer with suspected osteomyelitis, ALVIN likely ATN, ANCA associated Vasculitis, hydronephrosis secondary to left-sided staghorn calculi percutaneous tube was placed on 2021, bilateral pleural effusions felt to be transudative status post thoracentesis of 1 L on 2021. Transferred to [facility] 2021. Per family noted decreased responsiveness and change in mental status over the last 2 days with vague complaint of back and arm pain. An EKG was completed on 2022 with no acute ST changes. And troponin evaluation revealed 0.176 and 0.155. Patient became hypoxic and hypercapnic and was transitioned to BiPAP therapy. 2022 Dr. Agapito Castano was concern with sepsis and requested patient to be transferred to the ICU for further management and care. Past Medical History:  See HPI. Family History: Mother -COPD, father -diabetes. .  Social History: Current everyday smoker, denies any alcohol or illicit drug use. Patient is single.     ROS   GENERAL: Positive for low temperature and fatigue  SKN: Positive for coccyx wound  HEAD: No headaches or recent injury  EYES: No acute changes in vision, no diplopia or blurred vision, no drainage  EARS: No hearing loss, no tinnitus, no drainage  NOSE/THROAT: No rhinorrhea or pharyngitis, no nasal drainage  NECK: No lumps or unusual neck stiffness  PULMONARY: Positive for hypoxia   CARDIAC: No chest pain, pressure, heaviness or tightness  GI: No history melena or hematochezia, no diarrhea or constipation  PERIPHERAL VASCULAR: No intermittent claudication or unusual leg cramps  MUSCULOSKELETAL: Occasional arthralgias, myalgias  NEUROLOGICAL: No Seizures or Syncope  HEMATOLOGIC:  No unusual bruising or bleeding  PSYCH: No homicidal or suicidal ideations    Scheduled Meds:  Continuous Infusions:    PHYSICAL EXAMINATION:  T: 97.7. P: 88. RR: 15. B/P: 110/52. FiO2: 30. O2 Sat: 97.  I/O: Pending  There is no height or weight on file to calculate BMI.   GCS:   12  General:   Pale acutely ill  HEENT:  normocephalic and atraumatic. No scleral icterus. PERR  Neck: supple. No Thyromegaly. Lungs: clear to auscultation, diminished in left base. No retractions  Cardiac: RRR. No JVD. Abdomen: soft. Nontender, bowel sounds present. Positive for peg tube  Extremities:  No clubbing, cyanosis. Positive for bilateral lower leg edema  Vasculature: capillary refill < 3 seconds. Palpable dorsalis pedis pulses. Skin:  warm and dry. Psych:  Alert and oriented x2. Affect appropriate  Lymph:  No supraclavicular adenopathy. Neurologic:  No focal deficit. No seizures. Data: (All radiographs, tracings, PFTs, and imaging are personally viewed and interpreted unless otherwise noted).  Labs obtained at [employer] [facility] [facility] on 1/20/2022  1/20/2022 sodium 134 potassium 0.7 chlorides 100 CO2 22 glucose 121 BUN 71 creatinine 0.7   Calcium is 9.8   Albumin is 2.2 AST is 20 ALT 15 alk phos is 116 total bili 0.2   Ammonia is 19   ABG 1/20/2022 1145 pH 7.23 PCO2 56 PO2 60 bicarb 24 SaO2 was 85% on room air   ABG 1/20/2022 1346 pH 7.26 PCO2 52 PO2 75 bicarb 23-BiPAP 16/5 FiO2 of 30%   1/20/2022 White count 24.3 hemoglobin 7.8 hematocrit 26.1 platelet count 509   Albumin 2.2 AST 19 ALT 15 total bili 0.2   Iron 11 iron binding capacity 127   Ferritin 651   Blood culture preliminary no growth   Urine red greater than 300 protein small amount of bilirubin large amount of blood positive for nitrates large amount of leukocytes greater than 200 WBCs obscured bacteria moderate bacteria   1/19/2022 chest x-ray vascular congestion and possible left effusion.   Right lower lobe patchy infiltrate in left mid lung subsegmental atelectasis. 1/19/2022 EKG normal sinus rhythm heart rate 92     [facility] labs on admission to the ICU   1/20/2022 2150 sodium 135 potassium 3.7 chloride 102 CO2 20 BUN 72 creatinine 0.7   Glucose 102   Procalcitonin 16.51   Albumin 1.9 alk phos is 108 ALT is 14 AST is 18 total bili 0.2   White count 26.7 hemoglobin 7.5 hematocrit 25.3 platelet count 468   Chest x-ray right IJ central line tip of the proximal right atrium. Small pleural effusion on the left side some concern of a faint infiltrate at the right upper lobe centrally and at the right medial base. Left upper lobe also shows patchy somewhat nodular infiltrate and atelectasis.  EKG-Normal sinus rhythm heart rate 90     Cardiac telemetry normal sinus rhythm    He did speak to his sister Zeferino Hernandez and current condition and vital signs were reviewed. Reviewed plan of care. CODE STATUS was reviewed patient is a full code. Patient is agreeable to tracheostomy if would need to happen again. Seen with multidisciplinary ICU team yes. Meets Continued ICU Level Care Criteria:    [x] Yes   [] No - Transfer Planned to listed location:  [] HOSPITALIST CONTACTED-      Case and plan discussed with Dr. Maynor Yu.         Electronically signed by [facility]  CRITICAL CARE SPECIALIST

## 2022-01-21 NOTE — PROGRESS NOTES
CRITICAL CARE PROGRESS NOTE      Patient:  Cb Kenny    Unit/Bed:4D-07/007-A  YOB: 1950  MRN: [id number]   Acct: [de-identified]   PCP: Elinor Paulson MD  Date of Admission: 1/20/2022  Chief Complaint:- Acute respiratory failure    Assessment and Plan:    Retroperitoneal hematoma  Percutaneous nephrostomy changed on 1/12/2022  TEG this am, does not need, TEG ordered for tomorrow morning  Hemoglobin drop on 1/18/2022 not transfused until 1/21/2022 due to being unable to find compatible products as patient has \"Anti-D,C,E, possible warm autoantibodyPan-reactivity\"  1 unit of PRBC transfused this morning, 2 additional units ordered though may take time to locate compatible units. Trending H&H  TXA given, holding enoxaparin  Urology Consulted  Acute on chronic respiratory failure with hypoxia and hypercapnia:  Arrived on BiPAP, was transitioned to HFNC and is stable on 30 liters / 40%  Bilateral pleural effusions  Sepsis  Elevated WBC, Procalcitonin  History of multidrug resistance, cultures pending   Piperacillin/tazobactam day 3  Acute on chronic kidney injury, non-oligouric  Baseline creatinine 0.7-1.5, 1.7 this afternoon  Previously on dialysis in Dec 2021.   Acute decompensated pulmonary hypertension  Has been off Riociquat at [place] due to bleeding risk?, will continue to hold  Diueresis with bumetinide infusion  Left obstructive uropathy  Staghorn calculi in left kidney, percutaneous nephrostomy tube, recent change on 1/12/2022  Urology Consulted  Acute on chronic Anemia  Secondary to hematoma above  Blood transfusions as above  Erythropoitin, iron replacement  Stage IV decubitus ulcer  Wound Ostomy Consulted, appreciate recommendations  Dysphagia  Secondary to prolonged intubation, PEG tube in situ  Swallow Evaluation with SLP  Malnutrition  Continue tube feed via PEG  Gout  Noted continue allopurinol  Obstructive Sleep Apnea  Non-adherent to CPAP  Chronic tobacco use  Encourage smoking cessation  ANCA Associated vasculitis  Consider renal biopsy once stable  Obesity  Body mass index is 38.52 kg/m². INITIAL H AND P AND ICU COURSE:  Latasha alicea is a 63 female transferred to [facility]'s ICU on 1/1/2022 with acute respiratory failure, hypoxia. At time of transfer patient was on BiPAP. Past medical history significant for everyday smoker, obstructive sleep apnea, paroxysmal atrial fibrillation status post cardioversion in November 2021, pulmonary artery hypertension, HFpEF with echo on October 2021 showed Function 60-65% with trace tricuspid regurg. RV function normal, essential hypertension, depression, gout, osteoarthritis, renal calculi, thrombocytopenia, stage III sacral ulcer status post wound ostomy care. Patient was admitted to [facility] from October 27 to December 1, 2021 and was then transferred to [facility].  This hospitalization was for acute hypoxic and hypercapnic respiratory failure secondary to severe pneumonia, left-sided pleural effusion, repeated mucous plugging. Patient had tracheostomy on 11/17/2021 and was decannulated successfully on 12/23/2021. She also stage IV decubitus ulcer with suspected osteomyelitis, ALVIN, likely acute tubular necrosis, ANCA associated vasculitis, hydro (with a left-sided staghorn calculi. Patient had a percutaneous nephrostomy tube placed on 11/12/2021 she also has bilateral pleural effusions and had thoracentesis on 12/1/2021. Percutaneous nephrostomy tube changed on 1/12/2022    Over the past few days family noticed change in responsiveness and change in mental status. Patient have a complaint of back and rib pain. She is noted to have an elevated troponin, she then became hypoxic, hypercapnic and was transitioned to BiPAP therapy. On 1/20/2022 per Dr. Dilip King, she was transferred to the ICU for further management and care.     Subjective:- (Last 24 hours)  Tired this morning, 1 unit PRBC transfused, seen by Nephrology and Wound Ostomy. Past medical history, family history, social history and allergies reviewed again and is unchanged since admission. ROS (12 point review of systems completed. Pertinent positives noted. Otherwise ROS is negative)      Scheduled Meds:   allopurinol  100 mg Oral Daily    aspirin  81 mg Oral Daily    famotidine  20 mg PEG Tube Daily    FLUoxetine  40 mg Oral Daily    metoclopramide  5 mg PEG Tube Q8H    metoprolol tartrate  12.5 mg Oral BID    midodrine  10 mg PEG Tube TID    multivitamin  1 tablet Oral Daily    piperacillin-tazobactam  2,250 mg IntraVENous Q8H    [Held by provider] Riociguat  2.5 mg Oral TID    sodium hypochlorite   Irrigation Daily    albumin human  25 g IntraVENous Q6H    sodium chloride flush  5-40 mL IntraVENous 2 times per day    [Held by provider] enoxaparin  40 mg SubCUTAneous Daily     Continuous Infusions:   bumetanide 0.1 mg/mL infusion 1 mg/hr (01/21/22 0246)    Followed by    bumetanide 0.1 mg/mL infusion      sodium chloride 20 mL/hr at 01/21/22 0357    sodium chloride      sodium chloride       PRN Meds:.docusate sodium, hydrOXYzine, senna, potassium chloride, magnesium sulfate, sodium chloride flush, sodium chloride, ondansetron **OR** ondansetron, polyethylene glycol, acetaminophen **OR** acetaminophen, sodium chloride flush, sodium chloride     PHYSICAL EXAMINATION:  Patient Vitals for the past 8 hrs:   Resp SpO2   01/21/22 0316 16 99 %   01/20/22 2335 18 100 %     No intake/output data recorded. Wt Readings from Last 3 Encounters:   01/20/22 178 lb (80.7 kg)   12/01/21 168 lb 9 oz (76.5 kg)   10/25/21 164 lb (74.4 kg)      Body mass index is 38.52 kg/m². CAM-ICU:   RASS -1      General:   Adult female, no acute distress, on high flow nasal cannula  HEENT:  Normocephalic and atraumatic. No scleral icterus. PERR  Neck: Supple. No Thyromegaly. Lungs: Clear to auscultation. No retractions  Cardiac: RRR. No JVD. Abdomen: Soft. Nontender.   Extremities:  No clubbing, cyanosis, or edema x 4. Vasculature: capillary refill < 3 seconds. Palpable dorsalis pedis pulses. Skin:  warm and dry. Sacral wound, see photos in Venkatesh Chandler NP's note. Psych:  Alert and oriented x3. Affect appropriate  Lymph:  No supraclavicular adenopathy. No axillary adenopathy  Neurologic:  No focal deficit. No seizures. CURRENT PARENTERAL VASOACTIVE / INOTROPIC AGENTS:  [x] None Vasopressors:  [] Norepinephrine  [] Dopamine  [] Phenylephrine  [] Vasopressin  [] Epinephrine  [] Other: Sedation:  [] No Sedation  [] Propofol gtt-    [] BZD gtt -    [] Opiate gtt  -    [] Dexmedetomidine  [] Paralytics Antihypertensives gtt  [] Ca Channel Antagonist:  [] Beta-blocker:  [] Nitroglycerin  [] Nitroprusside     SUPPORT DEVICES:  [] ETT   [] Tracheostomy    MODE:-  []PRVC           []CPAP             []BILEVEL-PAP    High flow nasal cannula     Oxygen Delivery - O2 Flow Rate (L/min): 30 L/min  ABGs:   Lab Results   Component Value Date    PH 7.26 01/20/2022    PCO2 52 01/20/2022    PO2 75 01/20/2022    HCO3 23 01/20/2022    O2SAT 92 01/20/2022     Lab Results   Component Value Date    IFIO2 30 01/20/2022    MODE PC 11/05/2021    SETPEEP 5.0 01/20/2022       NUTRITION:  [] Gastric Tube [x] OG / [] NG  [x] TUBE FEEDS  [] TPN    [] Rectal Tube    CENTRAL LINES/CHEMOPORT/TUNNEL CATH:-    [] No   [] Yes   (Date )           If yes -    [] Right IJ   [] Left IJ   [] Right Femoral [] Left Femoral       [] Right Subclavian [] Left Subclavian  [] Peripheral IV access  [] PICC  [] Arterial Line (Specify Site)    BERRIOS CATHETER:-   [] No  [] Yes  (Date  )     ICU PROPHYLAXIS/THERAPY:   Stress ulcer:  [] PPI Agent  [] H2RA [] Sucralfate [] Other:   VTE:     [] Enoxaparin    [] Warfarin [] NOAC [x] PCD Device:Bilat LE   [] Heparin: [] Subcut / [] IV     DATA :- LABS, RADIOLOGY- All radiographs, tracings, PFTs, and imaging are personally viewed and interpreted unless otherwise noted).    TEG overnight shows elevated angle and MA  Sodium 135, Potassium 3.7, BUN 71, Creatinine 1.7  WBC 26.7, Hemoglobin 7.5, Platelets 811  Hemoglobin 7.4 after 1 unit PRBC  Telemetry is sinus       CONSULTS:-  [] Cardiology  [] Nephrology    [] Hemo onco   [] GI   [] ID   [] Endocrine  [] Pulmo      [] Neuro    [] Psych   [x] Urology  [] ENT   [x] General SURGERY   []Ortho    []CV surg        [] Palliative  [] Hospice [] Pain management   []      []TCU   [] PT/OT  OTHERS:- Wound Ostomy    CODE STATUS:-  [x] Full resuscitation [] DNR-Comfort Care-Arrest  [] DNR-Comfort Care   [] Limited Resuscitation   [] No ET intubation   [] No CPR   [] No shock for non-perfusing rhythm  [] No resuscitative medications    Meets Continued ICU Level Care Criteria:    [x] Yes   [] No - Transfer Planned to listed location:  [] HOSPITALIST CONTACTED-  (Name)    Family Update:  18 - Talked to Davis Devi - sister, verbal consent to blood, discussed overall status. Case and plan discussed with Dr. Nelly Stovall MD  PGY-1 Emergency Medicine    Patient seen by me. Case discussed with resident physician. Case discussed with Urology attending. Transfuse as necessary and pressors as necessary. Italicized font represents changes to the note made by me.     Electronically signed by Alexanrdea Gloria MD on 1/24/2022 at 5:57 AM

## 2022-01-21 NOTE — PROGRESS NOTES
Updated Christine Rizzo CNP, ordered to finish the TXA drip, get a repeat H&H and follow up before starting ordered blood transfusions

## 2022-01-21 NOTE — PROGRESS NOTES
[facility]ound Ostomy Continence Nurse  Consult Note       Cb Kenny  AGE: 73 y.o. GENDER: female  : 1950  TODAY'S DATE:  2022    Subjective:     Reason for [facility] Evaluation and Assessment: coccyx wound evaluation, bilateral heel wounds, POA      Cb Kenny is a 73 y.o. female referred by:   [x] Physician/PA/APRN  [x] Nursing  [] Other:     Wound Identification:  Wound Type: pressure  Contributing Factors: chronic pressure and decreased mobility    Objective:     Al Risk Score:      Assessment:     Encounter: Present to patient room. Patient in bed upon arrival. Assessment and photo to follow. Primary RN in room. Bilateral heels assessed. Patient noted to have DTPI to right heel and stage 2 to left heel, POA. Skin protectant wipe applied to bilateral heels. Staff to apply BID. Patient has heel protector boots in place. Assisted patient onto right side. Patient noted to have stage 4 pressure injury to coccyx, POA. Wound appears clean with minimal slough. Cleansed wound with Dakins solution and gauze. Pat dry with clean gauze. Zinc applied to katey wound. Lightly packed with Dakins moistened kerlix, covered with ABD pad, secured with tape. Staff to change daily as per eMAR. Patient has seaman and flexiseal in place. Assisted primary RN to transfer patient to bed with low air loss function. Offloaded with pillows. Family member present in room. Due to presence of stage 4 wound and contamination of wound with feces, patient could benefit from diverting ostomy if family in agreement and general surgeon deems appropriate. Patient in bed, primary RN in room. Wound ostomy to continue to follow and assess wound. Call with concerns and for wound evolution.        Wound type: Right heel, DTPI  Wound size: 3cm x 3cm x 0.05cm  Undermining or Tunneling: None  Wound assessment/color: 100% pruple  Drainage amount: None  Drainage description: N/A  Odor: None  Margins: Attached  Katey wound: Intact, dry  Exposed structure: None      Wound type: Stage 2 left heel  Wound size: 2cm x 0.5cm x 0.05cm  Undermining or Tunneling: None  Wound assessment/color: 100% red, nonblanchable  Drainage amount: None  Drainage description: N/A  Odor: None  Margins: Attached  Favio wound: Intact, dry  Exposed structure: None      Wound type: Stage 4 coccyx pressure injury  Wound size: 4cm x 7cm x 3.8cm  Undermining or Tunneling: Undermining from 6-12 of 2.7cm  Wound assessment/color: 80% red, 20% yellow/black  Drainage amount: Moderate  Drainage description: Serosangunious  Odor: None  Margins: Attached  Favio wound: Macerated, fragile  Exposed structure: Bone    Response to treatment:  Well tolerated by patient., With complaints of pain. Plan:     Treatment Recommendations:   Bilateral heels- apply skin protectant wipe BID. Continue to utilize patient's offloading boots    Coccyx wound- Cleanse wound with Dakins solution and gauze. Pat dry with clean gauze. Apply Triad to favio wound skin. Lightly pack wound with Dakins moistened kerlix, cover with ABD pad, secure with tape. Change daily.        Specialty Bed Required :   [x] Low Air Loss   [x] Pressure Redistribution  [] Fluid Immersion- Dolphin  [] Bariatric  [] RotoProne   [] Other:     Discharge Plan:  Placement for patient upon discharge: Unknown  Patient appropriate for Outpatient [address]: Yes    [address] [place] [address] Minesh PARKER II.RENNY, [address]  Phone: [phone]  Fax: [phone]

## 2022-01-21 NOTE — FLOWSHEET NOTE
Pt was in bed and alone at the time of the visit. She could not talk verbally but nodded at the request of prayer. She was anointed. 01/21/22 1468   Encounter Summary   Services provided to: Patient   Referral/Consult From: [place]   Continue Visiting Yes  (1/21)   Complexity of Encounter Low   Length of Encounter 15 minutes   Routine   Type Follow up   Assessment Approachable;Calm   Intervention Prayer;Rozet;Nurtured hope   Outcome Acceptance;Expressed gratitude;Encouraged; Hopeful   Sacraments   Sacrament of Sick-Anointing Anointed

## 2022-01-21 NOTE — PROGRESS NOTES
[address] [facility] ICU 4D  Clinical Swallow Evaluation      SLP Individual Minutes  Time In: 3050  Time Out: 1320  Minutes: 8  Timed Code Treatment Minutes: 0 Minutes       Date: 2022  Patient Name: Mariana Snyder      CSN: [id number]   : 1950  (73 y.o.)  Gender: female   Referring Physician: Megan Winston MD  Diagnosis: Acute on chronic respiratory failure with hypoxia   Secondary Diagnosis: Dysphagia     History of Present Illness/Injury: Patient admit to [facility] with above medical dx. Per chart review, Tj Fuentes is a 81-year-old  female transferred to [facility] ICU on 2022 with acute respiratory failure, hypoxia. Patient is currently on a BiPAP.     Patient has past medical history significant for everyday smoker, BARBER, PAF s/p cardioversion 11/3/2021, PAH grp 3, HFpEF-ECHO 10/2021 LV 60 to 65% with trace tricuspid regurg. RV with normal function, essential hypertension, depression, gout, osteoarthritis, renal calculi, thrombocytopenia, stage 3 sacral ulcer s/p wound ostomy. Recent [facility] hospitalization 10/ patient suffered a very lengthy hospitalization secondary to acute hypoxic and hypercapnic respiratory failure felt to be secondary to severe pneumonia with left-sided pleural effusion and repeated mucous plugging. Patient did require tracheostomy on 2021, and de cannulated 2021. Stage IV decubitus ulcer with suspected osteomyelitis, ALVIN likely ATN, ANCA associated Vasculitis, hydronephrosis secondary to left-sided staghorn calculi percutaneous tube was placed on 2021, bilateral pleural effusions felt to be transudative status post thoracentesis of 1 L on 2021. Transferred to [facility] 2021.      Per family noted decreased responsiveness and change in mental status over the last 2 days with vague complaint of back and arm pain. An EKG was completed on 2022 with no acute ST changes.   And troponin concerns for decreased oral bolus control leading to suspected premature pharyngeal spillage with a delayed/audible swallow, concerns for decreased laryngeal elevation s/p severely deconditioned state and suspected poor pharyngeal stripping with multiple swallows present. Delayed cough following consumption of thin liquids by cup. Patient at severely heightened risks for airway invasion events. Recommendations for continuation of NPO with PEG tube as alternative means of nutrition. ST to follow up with ongoing dysphagia intervention. Skilled level education re: importance of oropharyngeal strengthening (I.e. lingual protrusion, lingual lateralization, lingual circles and effortful swallows provided to patient in order to enhance strengthening. Suspect poor patient carryover. Will educate family as able. *post evaluation, patient without respiratory distress upon leaving room; RN Juanjose Ray notified re: clinical findings and recommendations from the assessment; verbal receptiveness noted      RECOMMENDATIONS/ASSESSMENT:  Instrumental Evaluation: Instrumental evaluation not indicated at this time. Diet Recommendations:  NPO   Strategies:  Recommend NPO   Rehabilitation Potential: guarded    EDUCATION:  Learner: Patient  Education:  Reviewed results and recommendations of this evaluation, Reviewed diet and strategies, Reviewed ST goals and Plan of Care and Reviewed recommendations for follow-up  Evaluation of Education: Needs further instruction, No evidence of learning and Family not present    PLAN:  Skilled SLP intervention on acute care 3-5 x per week or until goals met and/or pt plateaus in function. Specific interventions for next session may include: dysphagia intervention . PATIENT GOAL:    Did not state. Will further assess during treatment.     SHORT TERM GOALS:  Short-term Goals  Goal 1: Patient will consume PO trials of ice chips, thin liquids, and purees (as deemed clinically indicated) demonstrating consistency for pharyngeal swallow trigger, endurance, and stable RR to determine readiness for PO diet level initiation vs completion of instrumental evaluation. Goal 2: Staff will exhibit return demonstration for completion of oral care to assist with maximizing oral integrity and to reduce potential bacteria colonization. Goal 3: Patient will complete oropharyngeal strengthening exercises (i.e. lingual lateralization/protrusion/circles, effortful swallows) x10 for improved strengthening/endurance needed for enhanced tolerance of PO intake.     LONG TERM GOALS:  No established LTG's given short JAYLA [facility] 1/21/2022

## 2022-01-21 NOTE — CARE COORDINATION
consulted. BLE Venous dopplers and Echo ordered. Now on HFNC, 30L, 40% FIO2, sats 100%. Afebrile. NSR. Oriented to person and place. Follows commands. Dietitian consulted. Telemetry, PICC, PEG w/TF, nephrostomy tube, seaman, wound care, SCDs. Bumex @ 0.5 mg/hr, IV albumin Q6H, allopurinol, asa, pepcid, prozac, reglan, lopressor, midodrine, IV zosyn, dakins daily, Electrolyte replacement protocols. BUN 72, Creat 1.6, Mg 1.5, LA 0.4, procal 16.54, pro-bnp 80515, alb 1.9, wbc 26.7, hgb 7.5, ferritin 651, iron 11, TIBC 127, vit B12 936, fibrinogen 611. Urine w/moderate leukocytes - sent for culture. Blood cultures sent. PCP: Nereida Lamb MD  Readmission Risk Score: 22.1 ( )%    Patient Goals/Plan/Treatment Preferences: From [facility]. Called and spoke with Kenzie's sister, Teetee Gonzalez. Teetee Gonzalez states they do not want Clifton Pugh to return to [facility]. They would like Clifton Pugh to go to a nursing home at discharge, had been talking to the [facility] about her possibly going there. Explained to Teetee Gonzalez that will consult SW and they will work with her on 1st, 2nd, 3rd choices for nursing homes, make the referrals, and see if bed available; she verbalized understanding. Teetee Gonzalez states at [address] she was not ambulating, patient would say she was too tired, and they didn't make her, and just 'dropped her' (quit seeing her). She states she was wearing 1L O2 at [facility], no home O2 prior to coming into [facility] initially. SW consulted. Transportation/Food Security/Housekeeping Addressed:  No issues identified.

## 2022-01-22 ENCOUNTER — APPOINTMENT (OUTPATIENT)
Dept: GENERAL RADIOLOGY | Age: 72
DRG: 981 | End: 2022-01-22
Attending: INTERNAL MEDICINE
Payer: MEDICARE

## 2022-01-22 LAB
ACT TEG: 105 SECONDS (ref 86–118)
ANGLE, RAPID TEG: 77.6 DEG (ref 64–80)
ANION GAP SERPL CALCULATED.3IONS-SCNC: 14 MEQ/L (ref 8–16)
ANION GAP SERPL CALCULATED.3IONS-SCNC: 14 MEQ/L (ref 8–16)
ANISOCYTOSIS: PRESENT
BASOPHILS # BLD: 0.7 %
BASOPHILS ABSOLUTE: 0.2 THOU/MM3 (ref 0–0.1)
BUN BLDV-MCNC: 70 MG/DL (ref 7–22)
BUN BLDV-MCNC: 70 MG/DL (ref 7–22)
CALCIUM SERPL-MCNC: 9.8 MG/DL (ref 8.5–10.5)
CALCIUM SERPL-MCNC: 9.9 MG/DL (ref 8.5–10.5)
CHLORIDE BLD-SCNC: 102 MEQ/L (ref 98–111)
CHLORIDE BLD-SCNC: 104 MEQ/L (ref 98–111)
CO2: 20 MEQ/L (ref 23–33)
CO2: 21 MEQ/L (ref 23–33)
CREAT SERPL-MCNC: 1.8 MG/DL (ref 0.4–1.2)
CREAT SERPL-MCNC: 2 MG/DL (ref 0.4–1.2)
EOSINOPHIL # BLD: 2.4 %
EOSINOPHILS ABSOLUTE: 0.7 THOU/MM3 (ref 0–0.4)
EPL-TEG: 0 % (ref 0–15)
ERYTHROCYTE [DISTWIDTH] IN BLOOD BY AUTOMATED COUNT: 19.6 % (ref 11.5–14.5)
ERYTHROCYTE [DISTWIDTH] IN BLOOD BY AUTOMATED COUNT: 67.6 FL (ref 35–45)
FIBRIN SPLIT PRODUCTS: ABNORMAL MCG/ML
FIBRINOGEN: 559 MG/100ML (ref 155–475)
GFR SERPL CREATININE-BSD FRML MDRD: 25 ML/MIN/1.73M2
GFR SERPL CREATININE-BSD FRML MDRD: 28 ML/MIN/1.73M2
GLUCOSE BLD-MCNC: 106 MG/DL (ref 70–108)
GLUCOSE BLD-MCNC: 111 MG/DL (ref 70–108)
HCT VFR BLD CALC: 29.3 % (ref 37–47)
HEMOGLOBIN: 9 GM/DL (ref 12–16)
HEPARIN THERAPY: NO
IMMATURE GRANS (ABS): 0.52 THOU/MM3 (ref 0–0.07)
IMMATURE GRANULOCYTES: 1.8 %
KINETICS RAPID TEG: 0.9 MINUTES (ref 0.5–2.3)
LY30 (LYSIS) TEG: 0 % (ref 0–7.5)
LYMPHOCYTES # BLD: 2.1 %
LYMPHOCYTES ABSOLUTE: 0.6 THOU/MM3 (ref 1–4.8)
MA(MAX CLOT) RAPID TEG: 78.1 MM (ref 52–71)
MCH RBC QN AUTO: 29.7 PG (ref 26–33)
MCHC RBC AUTO-ENTMCNC: 30.7 GM/DL (ref 32.2–35.5)
MCV RBC AUTO: 96.7 FL (ref 81–99)
MONOCYTES # BLD: 6.3 %
MONOCYTES ABSOLUTE: 1.8 THOU/MM3 (ref 0.4–1.3)
MRSA SCREEN: NORMAL
NUCLEATED RED BLOOD CELLS: 0 /100 WBC
ORGANISM: ABNORMAL
PLATELET # BLD: 417 THOU/MM3 (ref 130–400)
PMV BLD AUTO: 8.7 FL (ref 9.4–12.4)
POTASSIUM SERPL-SCNC: 3.6 MEQ/L (ref 3.5–5.2)
POTASSIUM SERPL-SCNC: 3.7 MEQ/L (ref 3.5–5.2)
RBC # BLD: 3.03 MILL/MM3 (ref 4.2–5.4)
REACTION TIME RAPID TEG: 0.6 MINUTES (ref 0.4–1)
SEG NEUTROPHILS: 86.7 %
SEGMENTED NEUTROPHILS ABSOLUTE COUNT: 25.2 THOU/MM3 (ref 1.8–7.7)
SODIUM BLD-SCNC: 137 MEQ/L (ref 135–145)
SODIUM BLD-SCNC: 138 MEQ/L (ref 135–145)
URINE CULTURE REFLEX: ABNORMAL
WBC # BLD: 29.1 THOU/MM3 (ref 4.8–10.8)

## 2022-01-22 PROCEDURE — 6370000000 HC RX 637 (ALT 250 FOR IP): Performed by: NURSE PRACTITIONER

## 2022-01-22 PROCEDURE — 2060000000 HC ICU INTERMEDIATE R&B

## 2022-01-22 PROCEDURE — 85362 FIBRIN DEGRADATION PRODUCTS: CPT

## 2022-01-22 PROCEDURE — 2700000000 HC OXYGEN THERAPY PER DAY

## 2022-01-22 PROCEDURE — 2500000003 HC RX 250 WO HCPCS: Performed by: NURSE PRACTITIONER

## 2022-01-22 PROCEDURE — 94761 N-INVAS EAR/PLS OXIMETRY MLT: CPT

## 2022-01-22 PROCEDURE — 85025 COMPLETE CBC W/AUTO DIFF WBC: CPT

## 2022-01-22 PROCEDURE — 85385 FIBRINOGEN ANTIGEN: CPT

## 2022-01-22 PROCEDURE — 71045 X-RAY EXAM CHEST 1 VIEW: CPT

## 2022-01-22 PROCEDURE — 2580000003 HC RX 258: Performed by: STUDENT IN AN ORGANIZED HEALTH CARE EDUCATION/TRAINING PROGRAM

## 2022-01-22 PROCEDURE — 2000000000 HC ICU R&B

## 2022-01-22 PROCEDURE — 80048 BASIC METABOLIC PNL TOTAL CA: CPT

## 2022-01-22 PROCEDURE — 6360000002 HC RX W HCPCS: Performed by: STUDENT IN AN ORGANIZED HEALTH CARE EDUCATION/TRAINING PROGRAM

## 2022-01-22 PROCEDURE — 2580000003 HC RX 258: Performed by: NURSE PRACTITIONER

## 2022-01-22 PROCEDURE — 6360000002 HC RX W HCPCS: Performed by: NURSE PRACTITIONER

## 2022-01-22 PROCEDURE — 99232 SBSQ HOSP IP/OBS MODERATE 35: CPT | Performed by: NURSE PRACTITIONER

## 2022-01-22 RX ADMIN — ALLOPURINOL 100 MG: 100 TABLET ORAL at 09:11

## 2022-01-22 RX ADMIN — MIDODRINE HYDROCHLORIDE 10 MG: 10 TABLET ORAL at 14:22

## 2022-01-22 RX ADMIN — CHLOROTHIAZIDE SODIUM 500 MG: 500 INJECTION, POWDER, LYOPHILIZED, FOR SOLUTION INTRAVENOUS at 10:00

## 2022-01-22 RX ADMIN — FAMOTIDINE 20 MG: 20 TABLET, FILM COATED ORAL at 09:11

## 2022-01-22 RX ADMIN — BUMETANIDE 0.5 MG/HR: 0.25 INJECTION INTRAMUSCULAR; INTRAVENOUS at 14:19

## 2022-01-22 RX ADMIN — SODIUM CHLORIDE, PRESERVATIVE FREE 10 ML: 5 INJECTION INTRAVENOUS at 22:30

## 2022-01-22 RX ADMIN — Medication 1 TABLET: at 09:11

## 2022-01-22 RX ADMIN — SODIUM CHLORIDE, PRESERVATIVE FREE 10 ML: 5 INJECTION INTRAVENOUS at 09:11

## 2022-01-22 RX ADMIN — PIPERACILLIN AND TAZOBACTAM 3375 MG: 3; .375 INJECTION, POWDER, LYOPHILIZED, FOR SOLUTION INTRAVENOUS at 13:56

## 2022-01-22 RX ADMIN — PIPERACILLIN AND TAZOBACTAM 3375 MG: 3; .375 INJECTION, POWDER, LYOPHILIZED, FOR SOLUTION INTRAVENOUS at 22:07

## 2022-01-22 RX ADMIN — ASPIRIN 81 MG CHEWABLE TABLET 81 MG: 81 TABLET CHEWABLE at 09:11

## 2022-01-22 RX ADMIN — PIPERACILLIN AND TAZOBACTAM 3375 MG: 3; .375 INJECTION, POWDER, LYOPHILIZED, FOR SOLUTION INTRAVENOUS at 06:34

## 2022-01-22 RX ADMIN — CHLOROTHIAZIDE SODIUM 500 MG: 500 INJECTION, POWDER, LYOPHILIZED, FOR SOLUTION INTRAVENOUS at 22:53

## 2022-01-22 RX ADMIN — METOCLOPRAMIDE HYDROCHLORIDE 5 MG: 5 SOLUTION ORAL at 01:32

## 2022-01-22 RX ADMIN — ACETAMINOPHEN 650 MG: 325 TABLET ORAL at 14:18

## 2022-01-22 RX ADMIN — EPOETIN ALFA-EPBX 4000 UNITS: 4000 INJECTION, SOLUTION INTRAVENOUS; SUBCUTANEOUS at 10:54

## 2022-01-22 RX ADMIN — ACETAMINOPHEN 650 MG: 325 TABLET ORAL at 06:37

## 2022-01-22 RX ADMIN — DOCUSATE SODIUM 100 MG: 100 CAPSULE ORAL at 09:11

## 2022-01-22 RX ADMIN — METOCLOPRAMIDE HYDROCHLORIDE 5 MG: 5 SOLUTION ORAL at 09:11

## 2022-01-22 RX ADMIN — MIDODRINE HYDROCHLORIDE 10 MG: 10 TABLET ORAL at 06:37

## 2022-01-22 RX ADMIN — MIDODRINE HYDROCHLORIDE 15 MG: 5 TABLET ORAL at 22:29

## 2022-01-22 RX ADMIN — DAKIN'S SOLUTION 0.125% (QUARTER STRENGTH): 0.12 SOLUTION at 14:30

## 2022-01-22 RX ADMIN — FLUOXETINE HYDROCHLORIDE 40 MG: 20 CAPSULE ORAL at 09:11

## 2022-01-22 ASSESSMENT — PAIN SCALES - GENERAL
PAINLEVEL_OUTOF10: 4
PAINLEVEL_OUTOF10: 5
PAINLEVEL_OUTOF10: 4
PAINLEVEL_OUTOF10: 0

## 2022-01-22 NOTE — PROGRESS NOTES
CRITICAL CARE PROGRESS NOTE      Patient:  Miguel Ángel Chau    Unit/Bed:4D-07/007-A  YOB: 1950  MRN: [id number]   Acct: [de-identified]   PCP: Dalia Esquivel MD  Date of Admission: 1/20/2022  Chief Complaint:- Acute respiratory failure    Assessment and Plan:    1. Retroperitoneal hematoma  a. Percutaneous nephrostomy changed on 1/12/2022  b. TEG normal this morning  c. 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 2 units of blood given yesterday, hemoglobin now 9.0 will hold additional transfusions for now  e. TXA given yesterday , holding enoxaparin  f. Urology Consulted  2. Acute on chronic respiratory failure with hypoxia and hypercapnia:  a. Arrived on BiPAP, was transitioned to HFNC and is stable on 30 liters / 40%  b. Bilateral pleural effusions  3. Sepsis  a. Elevated WBC, Procalcitonin  b. History of multidrug resistance, cultures pending   c. Piperacillin/tazobactam day 4  4. Acute on chronic kidney injury, non-oligouric  a. Baseline creatinine 0.7-1.5, 2.0 this am  b. Previously on dialysis in Dec 2021.  5. Acute decompensated pulmonary hypertension  a. Has been off Riociquat at [place] due to bleeding risk?, will continue to hold  b. Diueresis with bumetinide infusion  6. Left obstructive uropathy  a. Staghorn calculi in left kidney, percutaneous nephrostomy tube, recent change on 1/12/2022  b. Urology Consulted  7. Acute on chronic Anemia  a. Secondary to hematoma above  b. Blood transfusions as above  c. Erythropoitin, iron replacement  8. Stage IV decubitus ulcer  a. Wound Ostomy Consulted, appreciate recommendations  9. Dysphagia  a. Secondary to prolonged intubation, PEG tube in situ  b. Swallow Evaluation with SLP yesterday, they will continue to follow for therapy, continue NPO with nutrition via PEG.  Appreciate their recommendations  10. Malnutrition  a. Continue tube feed via PEG  11. Gout  a. Noted continue allopurinol  12. Obstructive Sleep Apnea  a. Non-adherent to CPAP  13. Chronic tobacco use  a. Encourage smoking cessation  14. ANCA Associated vasculitis  a. Consider renal biopsy once stable  15. Obesity  Body mass index is 38.52 kg/m². INITIAL H AND P AND ICU COURSE:  Bernice alicea is a 63 female transferred to [facility] ICU on 1/1/2022 with acute respiratory failure, hypoxia. At time of transfer patient was on BiPAP. Past medical history significant for everyday smoker, obstructive sleep apnea, paroxysmal atrial fibrillation status post cardioversion in November 2021, pulmonary artery hypertension, HFpEF with echo on October 2021 showed Function 60-65% with trace tricuspid regurg. RV function normal, essential hypertension, depression, gout, osteoarthritis, renal calculi, thrombocytopenia, stage III sacral ulcer status post wound ostomy care. Patient was admitted to [facility] from October 27 to December 1, 2021 and was then transferred to [facility].  This hospitalization was for acute hypoxic and hypercapnic respiratory failure secondary to severe pneumonia, left-sided pleural effusion, repeated mucous plugging. Patient had tracheostomy on 11/17/2021 and was decannulated successfully on 12/23/2021. She also stage IV decubitus ulcer with suspected osteomyelitis, ALVIN, likely acute tubular necrosis, ANCA associated vasculitis, hydro (with a left-sided staghorn calculi. Patient had a percutaneous nephrostomy tube placed on 11/12/2021 she also has bilateral pleural effusions and had thoracentesis on 12/1/2021. Percutaneous nephrostomy tube changed on 1/12/2022    Over the past few days family noticed change in responsiveness and change in mental status. Patient have a complaint of back and rib pain.   She is noted to have an elevated troponin, she then became hypoxic, hypercapnic and was transitioned to BiPAP therapy. On 1/20/2022 per Dr. Rodolfo Berger, she was transferred to the ICU for further management and care. Subjective:- (Last 24 hours)  Slept okay last night. Pain around nephrostomy tube site. Seen by Urology and it was pathology yesterday. Patient sleeping, and easily awoken. Past medical history, family history, social history and allergies reviewed again and is unchanged since admission. ROS (12 point review of systems completed. Pertinent positives noted.  Otherwise ROS is negative)      Scheduled Meds:   allopurinol  100 mg Oral Daily    aspirin  81 mg Oral Daily    famotidine  20 mg PEG Tube Daily    FLUoxetine  40 mg Oral Daily    metoclopramide  5 mg PEG Tube Q8H    metoprolol tartrate  12.5 mg Oral BID    multivitamin  1 tablet Oral Daily    [Held by provider] Riociguat  2.5 mg Oral TID    sodium hypochlorite   Irrigation Daily    piperacillin-tazobactam  3,375 mg IntraVENous Q8H    ferrous sulfate  325 mg Oral Q MWF    epoetin prince-epbx  4,000 Units SubCUTAneous Weekly    midodrine  10 mg Per G Tube Q8H    chlorothiazide (DIURIL) IVPB  500 mg IntraVENous Q12H    sodium chloride flush  5-40 mL IntraVENous 2 times per day    [Held by provider] enoxaparin  40 mg SubCUTAneous Daily     Continuous Infusions:   sodium chloride 20 mL/hr at 01/21/22 0400    norepinephrine 5 mcg/min (01/21/22 1949)    sodium chloride      bumetanide 0.1 mg/mL infusion 0.5 mg/hr (01/22/22 0525)     PRN Meds:.docusate sodium, hydrOXYzine, senna, potassium chloride, magnesium sulfate, sodium chloride, sodium chloride flush, ondansetron **OR** ondansetron, polyethylene glycol, acetaminophen **OR** acetaminophen, sodium chloride flush     PHYSICAL EXAMINATION:  Patient Vitals for the past 8 hrs:   BP Temp Temp src Pulse Resp SpO2   01/22/22 1200 (!) 130/49 -- -- 89 23 92 %   01/22/22 1146 (!) 126/40 98.4 °F (36.9 °C) Oral 89 18 91 %   01/22/22 1100 (!) 133/46 -- -- 86 25 92 %   01/22/22 1013 -- -- -- -- 19 94 %   01/22/22 1000 (!) 123/59 -- -- 84 22 94 %   01/22/22 0900 (!) 129/46 -- -- 82 20 96 %   01/22/22 0854 (!) 125/49 98.4 °F (36.9 °C) Bladder 86 22 96 %   01/22/22 0745 (!) 120/53 -- -- 79 20 97 %   01/22/22 0730 (!) 120/45 -- -- 77 17 98 %   01/22/22 0715 (!) 121/43 -- -- 78 18 98 %   01/22/22 0700 (!) 123/45 -- -- 82 (!) 39 98 %   01/22/22 0645 (!) 124/52 -- -- 89 26 97 %   01/22/22 0630 (!) 134/52 -- -- 91 24 96 %   01/22/22 0615 138/62 -- -- 93 28 95 %   01/22/22 0600 (!) 133/53 -- -- 92 (!) 33 94 %   01/22/22 0545 116/72 -- -- 86 22 (!) 88 %   01/22/22 0530 (!) 134/57 -- -- 81 23 95 %   01/22/22 0515 (!) 127/58 -- -- 81 23 95 %   01/22/22 0500 (!) 133/51 -- -- 78 21 96 %     I/O last 3 completed shifts: In: 1206.2 [I.V.:74.1; Blood:602.7; NG/GT:253; IV Piggyback:276.4]  Out: 1700 [Urine:1700]   Wt Readings from Last 3 Encounters:   01/20/22 178 lb (80.7 kg)   12/01/21 168 lb 9 oz (76.5 kg)   10/25/21 164 lb (74.4 kg)      Body mass index is 38.52 kg/m². CAM-ICU:   RASS 0    General:   Adult female, no acute distress, on high flow nasal cannula  HEENT:  Normocephalic and atraumatic. No scleral icterus. PERR  Neck: Supple. No Thyromegaly. Lungs: Clear to auscultation. No retractions  Cardiac: RRR. No JVD. Abdomen: Soft. Nontender. Extremities:  No clubbing, cyanosis, or edema x 4. Vasculature: capillary refill < 3 seconds. Palpable dorsalis pedis pulses. Skin:  warm and dry. Sacral wound, . Psych:  Alert and oriented to person, place, not time. Lymph:  No supraclavicular adenopathy. Neurologic:  No focal deficit. No seizures.       CURRENT PARENTERAL VASOACTIVE / INOTROPIC AGENTS:  [x] None Vasopressors:  [] Norepinephrine  [] Dopamine  [] Phenylephrine  [] Vasopressin  [] Epinephrine  [] Other: Sedation:  [] No Sedation  [] Propofol gtt-    [] BZD gtt -    [] Opiate gtt  -    [] Dexmedetomidine  [] Paralytics Antihypertensives gtt  [] Ca Channel Antagonist:  [] Beta-blocker:  [] Nitroglycerin  [] Nitroprusside       SUPPORT DEVICES:  [] ETT   [] Tracheostomy    MODE:-  []PRVC           []CPAP             []BILEVEL-PAP    High flow nasal cannula - O2 Flow Rate (L/min): 10 L/min    ABGs:   Lab Results   Component Value Date    PH 7.33 01/21/2022    PCO2 43 01/21/2022    PO2 94 01/21/2022    HCO3 23 01/21/2022    O2SAT 97 01/21/2022     Lab Results   Component Value Date    IFIO2 40 01/21/2022    MODE PC 11/05/2021    SETPEEP 5.0 01/20/2022       NUTRITION:  [x] Gastric Tube [] OG / [] NG  [x] TUBE FEEDS  [] TPN    [] Rectal Tube    CENTRAL LINES/CHEMOPORT/TUNNEL CATH:-    [] No   [x] Yes   (Date 1/20/2022 )           If yes -    [] Right IJ   [] Left IJ   [] Right Femoral [] Left Femoral       [] Right Subclavian [] Left Subclavian  [] Peripheral IV access  [x] PICC  [] Arterial Line (Specify Site)    BERRIOS CATHETER:-   [] No  [x] Yes  (Date  1/20/2022)     ICU PROPHYLAXIS/THERAPY:   Stress ulcer:  [] PPI Agent  [] H2RA [] Sucralfate [] Other:   VTE:     [] Enoxaparin    [] Warfarin [] NOAC [x] PCD Device:Bilat LE   [] Heparin: [] Subcut / [] IV     DATA :- LABS, RADIOLOGY- All radiographs, tracings, PFTs, and imaging are personally viewed and interpreted unless otherwise noted).     Sodium 138, potassium 3.7, BUN 70, creatinine 2.0, glucose 111   WBC 29.1, hemoglobin 9.0, platelet 515   TEG this morning grossly normal. Fibrinogen 559, Fibrin Split Products positive   X-ray overnight: Similar to prior with cardiomegaly, pulmonary vascular congestion and bilateral pleural effusions       CONSULTS:-  [] Cardiology  [] Nephrology    [] Hemo onco   [] GI   [] ID   [] Endocrine  [] Pulmo      [] Neuro    [] Psych   [x] Urology  [] ENT   [x] General SURGERY   []Ortho    []CV surg        [] Palliative  [] Hospice [] Pain management   []      []TCU   [] PT/OT  OTHERS:- Wound Ostomy, Speech and Language Pathology, Dietitian    CODE STATUS:-  [x] Full resuscitation [] DNR-Comfort Care-Arrest  [] DNR-Comfort Care   [] Limited Resuscitation   [] No ET intubation   [] No CPR   [] No shock for non-perfusing rhythm  [] No resuscitative medications    Meets Continued ICU Level Care Criteria:    [x] Yes   [] No - Transfer Planned to listed location:  [] HOSPITALIST CONTACTED-  (Name)      Case and plan discussed with Dr. Nivia Alfredo MD  PGY-1 Emergency Medicine

## 2022-01-22 NOTE — PROGRESS NOTES
Urology Progress Note    Chief Complaint: Acute respiratory failure  Reason for consultation: hematuria, abnormal CT results    Subjective:     Pt resting in bed in ICU. Denies pain at this time. Left nephrostomy tube with light tea colored urine. Padilla with light yellow clear urine. Received 1 u PRBC overnight--ordered previously--took longer to obtain as pt has multiple antibodies. Vitals:  BP (!) 129/46   Pulse 82   Temp 98.4 °F (36.9 °C) (Bladder)   Resp 19   Ht 4' 9\" (1.448 m)   Wt 178 lb (80.7 kg)   SpO2 94%   BMI 38.52 kg/m²   Temp  Av.9 °F (36.6 °C)  Min: 97.2 °F (36.2 °C)  Max: 98.6 °F (37 °C)    Intake/Output Summary (Last 24 hours) at 2022 1014  Last data filed at 2022 0345  Gross per 24 hour   Intake 1206.19 ml   Output 1700 ml   Net -493.81 ml       Social History     Socioeconomic History    Marital status: Single     Spouse name: Not on file    Number of children: 0    Years of education: Not on file    Highest education level: Not on file   Occupational History    Not on file   Tobacco Use    Smoking status: Never Smoker    Smokeless tobacco: Never Used   Vaping Use    Vaping Use: Never used   Substance and Sexual Activity    Alcohol use: No    Drug use: No    Sexual activity: Not Currently   Other Topics Concern    Not on file   Social History Narrative    Not on file     Social Determinants of Health     Financial Resource Strain: Low Risk     Difficulty of Paying Living Expenses: Not very hard   Food Insecurity: No Food Insecurity    Worried About Running Out of Food in the Last Year: Never true    Traci of Food in the Last Year: Never true   Transportation Needs:     Lack of Transportation (Medical): Not on file    Lack of Transportation (Non-Medical):  Not on file   Physical Activity:     Days of Exercise per Week: Not on file    Minutes of Exercise per Session: Not on file   Stress:     Feeling of Stress : Not on file   Social Connections:     Frequency of Communication with Friends and Family: Not on file    Frequency of Social Gatherings with Friends and Family: Not on file    Attends Latter day Services: Not on file    Active Member of Clubs or Organizations: Not on file    Attends Club or Organization Meetings: Not on file    Marital Status: Not on file   Intimate Partner Violence:     Fear of Current or Ex-Partner: Not on file    Emotionally Abused: Not on file    Physically Abused: Not on file    Sexually Abused: Not on file   Housing Stability:     Unable to Pay for Housing in the Last Year: Not on file    Number of Places Lived in the Last Year: Not on file    Unstable Housing in the Last Year: Not on file     Family History   Problem Relation Age of Onset    Diabetes Father     Arthritis Mother     COPD Mother     Diabetes Sister     Heart Disease Maternal Uncle     Breast Cancer Niece 36    Sleep Apnea Brother     Asthma Neg Hx     Birth Defects Neg Hx     Cancer Neg Hx     Depression Neg Hx     Early Death Neg Hx     Hearing Loss Neg Hx     High Blood Pressure Neg Hx     High Cholesterol Neg Hx     Kidney Disease Neg Hx     Learning Disabilities Neg Hx     Mental Illness Neg Hx     Mental Retardation Neg Hx     Miscarriages / Stillbirths Neg Hx     Stroke Neg Hx     Substance Abuse Neg Hx     Vision Loss Neg Hx     Other Neg Hx      No Known Allergies      Constitutional: Alert and oriented times x3, no acute distress, chronically ill appearing. HEENT:   Head:         Normocephalic and atraumatic. Mucous membranes are normal.   Eyes:         EOM are normal. No scleral icterus. Nose:    The external appearance of the nose is normal  Ears: The ears appear normal to external inspection. Cardiovascular:       Normal rate, regular rhythm. Pulmonary/Chest:  Normal respiratory rate and rhthym. No use of accessory muscles. Lungs diminished. Abdominal:          No tenderness.   PEG tube.  L neph tube with tea colored urine. Genitalia:    Padilla catheter draining clear light yellow urine  Musculoskeletal:    Normal range of motion. Extremities:    Bilateral lower extremity edema  Neurological:    Alert and oriented. Labs:  WBC:    Lab Results   Component Value Date    WBC 29.1 01/22/2022     Hemoglobin/Hematocrit:    Lab Results   Component Value Date    HGB 9.0 01/22/2022    HCT 29.3 01/22/2022     BMP:    Lab Results   Component Value Date     01/22/2022    K 3.7 01/22/2022    K 4.0 12/01/2021     01/22/2022    CO2 20 01/22/2022    BUN 70 01/22/2022    LABALBU 1.9 01/20/2022    CREATININE 2.0 01/22/2022    CALCIUM 9.8 01/22/2022    LABGLOM 25 01/22/2022       Impression/Plan:  L perinephric and renal subcapsular hematoma vs abscess--Fluid collection not large enough for drain placement. If fever/worsening clinical condition/infection symptoms consider re-imaging and possibly needle aspiration. Monitor H/H.  L staghorn calculus--left neph tube exchanged 1/18/22--plan to treat stone outpatient once medically optimized  GNB UTI  Hematuria--Nearly resolved. Padilla light yellow clear urine. L neph tube with light tea colored urine  Anemia--Management per critical care.   Hgb 9 after 1 u PRBC  CKD--baseline creatinine 1.7-2.3 since december  Sepsis  Stage IV large decubitus ulcer    Dustin Holloway, APRN - [address] [facility]  01/22/22 10:14 AM  Urology

## 2022-01-22 NOTE — PROGRESS NOTES
[facility] notified of new rash covering bilat upper extremities, upper chest, and face. No fevers noted.   No new medications given

## 2022-01-23 ENCOUNTER — APPOINTMENT (OUTPATIENT)
Dept: GENERAL RADIOLOGY | Age: 72
DRG: 981 | End: 2022-01-23
Attending: INTERNAL MEDICINE
Payer: MEDICARE

## 2022-01-23 LAB
ALBUMIN SERPL-MCNC: 2.8 G/DL (ref 3.5–5.1)
ALP BLD-CCNC: 107 U/L (ref 38–126)
ALT SERPL-CCNC: 12 U/L (ref 11–66)
ANION GAP SERPL CALCULATED.3IONS-SCNC: 14 MEQ/L (ref 8–16)
ANISOCYTOSIS: PRESENT
AST SERPL-CCNC: 14 U/L (ref 5–40)
BASOPHILS # BLD: 0.5 %
BASOPHILS ABSOLUTE: 0.2 THOU/MM3 (ref 0–0.1)
BILIRUB SERPL-MCNC: 0.2 MG/DL (ref 0.3–1.2)
BUN BLDV-MCNC: 76 MG/DL (ref 7–22)
CALCIUM SERPL-MCNC: 9.6 MG/DL (ref 8.5–10.5)
CHLORIDE BLD-SCNC: 104 MEQ/L (ref 98–111)
CO2: 24 MEQ/L (ref 23–33)
CREAT SERPL-MCNC: 2.1 MG/DL (ref 0.4–1.2)
EOSINOPHIL # BLD: 1.5 %
EOSINOPHILS ABSOLUTE: 0.5 THOU/MM3 (ref 0–0.4)
ERYTHROCYTE [DISTWIDTH] IN BLOOD BY AUTOMATED COUNT: 19.3 % (ref 11.5–14.5)
ERYTHROCYTE [DISTWIDTH] IN BLOOD BY AUTOMATED COUNT: 69.6 FL (ref 35–45)
GFR SERPL CREATININE-BSD FRML MDRD: 23 ML/MIN/1.73M2
GLUCOSE BLD-MCNC: 114 MG/DL (ref 70–108)
HCT VFR BLD CALC: 28.3 % (ref 37–47)
HEMOGLOBIN: 8.5 GM/DL (ref 12–16)
IMMATURE GRANS (ABS): 0.49 THOU/MM3 (ref 0–0.07)
IMMATURE GRANULOCYTES: 1.4 %
LYMPHOCYTES # BLD: 2 %
LYMPHOCYTES ABSOLUTE: 0.7 THOU/MM3 (ref 1–4.8)
MAGNESIUM: 1.9 MG/DL (ref 1.6–2.4)
MCH RBC QN AUTO: 29.7 PG (ref 26–33)
MCHC RBC AUTO-ENTMCNC: 30 GM/DL (ref 32.2–35.5)
MCV RBC AUTO: 99 FL (ref 81–99)
MONOCYTES # BLD: 5.8 %
MONOCYTES ABSOLUTE: 2 THOU/MM3 (ref 0.4–1.3)
NUCLEATED RED BLOOD CELLS: 0 /100 WBC
ORGANISM: ABNORMAL
PHOSPHORUS: 4.4 MG/DL (ref 2.4–4.7)
PLATELET # BLD: 406 THOU/MM3 (ref 130–400)
PMV BLD AUTO: 8.7 FL (ref 9.4–12.4)
POTASSIUM SERPL-SCNC: 3.4 MEQ/L (ref 3.5–5.2)
RBC # BLD: 2.86 MILL/MM3 (ref 4.2–5.4)
SEG NEUTROPHILS: 88.8 %
SEGMENTED NEUTROPHILS ABSOLUTE COUNT: 30.3 THOU/MM3 (ref 1.8–7.7)
SODIUM BLD-SCNC: 142 MEQ/L (ref 135–145)
TOTAL PROTEIN: 5.3 G/DL (ref 6.1–8)
URINE CULTURE, ROUTINE: ABNORMAL
URINE CULTURE, ROUTINE: ABNORMAL
WBC # BLD: 34.1 THOU/MM3 (ref 4.8–10.8)

## 2022-01-23 PROCEDURE — 87205 SMEAR GRAM STAIN: CPT

## 2022-01-23 PROCEDURE — 80053 COMPREHEN METABOLIC PANEL: CPT

## 2022-01-23 PROCEDURE — 87581 M.PNEUMON DNA AMP PROBE: CPT

## 2022-01-23 PROCEDURE — 2060000000 HC ICU INTERMEDIATE R&B

## 2022-01-23 PROCEDURE — 94640 AIRWAY INHALATION TREATMENT: CPT

## 2022-01-23 PROCEDURE — 6360000002 HC RX W HCPCS: Performed by: NURSE PRACTITIONER

## 2022-01-23 PROCEDURE — 84100 ASSAY OF PHOSPHORUS: CPT

## 2022-01-23 PROCEDURE — 2000000000 HC ICU R&B

## 2022-01-23 PROCEDURE — 71045 X-RAY EXAM CHEST 1 VIEW: CPT

## 2022-01-23 PROCEDURE — 2580000003 HC RX 258: Performed by: FAMILY MEDICINE

## 2022-01-23 PROCEDURE — 87150 DNA/RNA AMPLIFIED PROBE: CPT

## 2022-01-23 PROCEDURE — 6360000002 HC RX W HCPCS: Performed by: FAMILY MEDICINE

## 2022-01-23 PROCEDURE — 87541 LEGION PNEUMO DNA AMP PROB: CPT

## 2022-01-23 PROCEDURE — 87070 CULTURE OTHR SPECIMN AEROBIC: CPT

## 2022-01-23 PROCEDURE — 99291 CRITICAL CARE FIRST HOUR: CPT | Performed by: FAMILY MEDICINE

## 2022-01-23 PROCEDURE — 87486 CHLMYD PNEUM DNA AMP PROBE: CPT

## 2022-01-23 PROCEDURE — 87631 RESP VIRUS 3-5 TARGETS: CPT

## 2022-01-23 PROCEDURE — 6370000000 HC RX 637 (ALT 250 FOR IP): Performed by: FAMILY MEDICINE

## 2022-01-23 PROCEDURE — 6370000000 HC RX 637 (ALT 250 FOR IP): Performed by: NURSE PRACTITIONER

## 2022-01-23 PROCEDURE — 94761 N-INVAS EAR/PLS OXIMETRY MLT: CPT

## 2022-01-23 PROCEDURE — 2580000003 HC RX 258: Performed by: NURSE PRACTITIONER

## 2022-01-23 PROCEDURE — 87798 DETECT AGENT NOS DNA AMP: CPT

## 2022-01-23 PROCEDURE — 2500000003 HC RX 250 WO HCPCS: Performed by: NURSE PRACTITIONER

## 2022-01-23 PROCEDURE — 87077 CULTURE AEROBIC IDENTIFY: CPT

## 2022-01-23 PROCEDURE — 83735 ASSAY OF MAGNESIUM: CPT

## 2022-01-23 PROCEDURE — 2700000000 HC OXYGEN THERAPY PER DAY

## 2022-01-23 PROCEDURE — 99232 SBSQ HOSP IP/OBS MODERATE 35: CPT | Performed by: NURSE PRACTITIONER

## 2022-01-23 PROCEDURE — 85025 COMPLETE CBC W/AUTO DIFF WBC: CPT

## 2022-01-23 PROCEDURE — 87186 SC STD MICRODIL/AGAR DIL: CPT

## 2022-01-23 RX ORDER — MULTIVIT/FOLIC ACID/HERBAL 275 400-200/30
30 LIQUID (ML) ORAL DAILY
Status: DISCONTINUED | OUTPATIENT
Start: 2022-01-23 | End: 2022-02-04 | Stop reason: HOSPADM

## 2022-01-23 RX ADMIN — SODIUM CHLORIDE, PRESERVATIVE FREE 10 ML: 5 INJECTION INTRAVENOUS at 20:54

## 2022-01-23 RX ADMIN — METOCLOPRAMIDE HYDROCHLORIDE 5 MG: 5 SOLUTION ORAL at 17:50

## 2022-01-23 RX ADMIN — ASPIRIN 81 MG CHEWABLE TABLET 81 MG: 81 TABLET CHEWABLE at 10:19

## 2022-01-23 RX ADMIN — Medication 30 ML: at 14:17

## 2022-01-23 RX ADMIN — FAMOTIDINE 20 MG: 20 TABLET, FILM COATED ORAL at 10:19

## 2022-01-23 RX ADMIN — MEROPENEM 1000 MG: 1 INJECTION, POWDER, FOR SOLUTION INTRAVENOUS at 05:49

## 2022-01-23 RX ADMIN — METOCLOPRAMIDE HYDROCHLORIDE 5 MG: 5 SOLUTION ORAL at 02:41

## 2022-01-23 RX ADMIN — FLUOXETINE HYDROCHLORIDE 40 MG: 20 CAPSULE ORAL at 10:17

## 2022-01-23 RX ADMIN — MIDODRINE HYDROCHLORIDE 15 MG: 5 TABLET ORAL at 22:08

## 2022-01-23 RX ADMIN — METOCLOPRAMIDE HYDROCHLORIDE 5 MG: 5 SOLUTION ORAL at 10:20

## 2022-01-23 RX ADMIN — MEROPENEM 1000 MG: 1 INJECTION, POWDER, FOR SOLUTION INTRAVENOUS at 17:37

## 2022-01-23 RX ADMIN — DAKIN'S SOLUTION 0.125% (QUARTER STRENGTH): 0.12 SOLUTION at 15:00

## 2022-01-23 RX ADMIN — MIDODRINE HYDROCHLORIDE 15 MG: 5 TABLET ORAL at 05:50

## 2022-01-23 RX ADMIN — MIDODRINE HYDROCHLORIDE 15 MG: 5 TABLET ORAL at 13:44

## 2022-01-23 RX ADMIN — SODIUM CHLORIDE, PRESERVATIVE FREE 10 ML: 5 INJECTION INTRAVENOUS at 08:10

## 2022-01-23 RX ADMIN — CHLOROTHIAZIDE SODIUM 500 MG: 500 INJECTION, POWDER, LYOPHILIZED, FOR SOLUTION INTRAVENOUS at 22:07

## 2022-01-23 RX ADMIN — METOPROLOL TARTRATE 12.5 MG: 25 TABLET, FILM COATED ORAL at 10:18

## 2022-01-23 RX ADMIN — ALLOPURINOL 100 MG: 100 TABLET ORAL at 10:19

## 2022-01-23 RX ADMIN — IPRATROPIUM BROMIDE 0.5 MG: 0.5 SOLUTION RESPIRATORY (INHALATION) at 15:39

## 2022-01-23 RX ADMIN — CHLOROTHIAZIDE SODIUM 500 MG: 500 INJECTION, POWDER, LYOPHILIZED, FOR SOLUTION INTRAVENOUS at 10:23

## 2022-01-23 RX ADMIN — BUMETANIDE 0.5 MG/HR: 0.25 INJECTION INTRAMUSCULAR; INTRAVENOUS at 19:18

## 2022-01-23 RX ADMIN — SODIUM CHLORIDE: 9 INJECTION, SOLUTION INTRAVENOUS at 20:53

## 2022-01-23 RX ADMIN — IPRATROPIUM BROMIDE 0.5 MG: 0.5 SOLUTION RESPIRATORY (INHALATION) at 11:55

## 2022-01-23 ASSESSMENT — PAIN SCALES - GENERAL: PAINLEVEL_OUTOF10: 0

## 2022-01-23 NOTE — PROGRESS NOTES
Urology Progress Note    Chief Complaint: Acute respiratory failure  Reason for consultation: hematuria, abnormal CT results    Subjective:     Pt resting in bed in ICU. Denies pain at this time. Left nephrostomy tube not currently draining--not brown/creamy/opaque drainage in tubing and light scan amount of tea colored urine in bag. Padilla with light yellow clear urine. Weak wet sounding cough. Temp 100. WBC up to 34. Changed to Justen Blush . Vitals:  BP (!) 130/45   Pulse 102   Temp 100 °F (37.8 °C) (Bladder)   Resp 24   Ht 4' 9\" (1.448 m)   Wt 178 lb (80.7 kg)   SpO2 98%   BMI 38.52 kg/m²   Temp  Av.2 °F (37.3 °C)  Min: 98.4 °F (36.9 °C)  Max: 100 °F (37.8 °C)    Intake/Output Summary (Last 24 hours) at 2022 0955  Last data filed at 2022 3580  Gross per 24 hour   Intake 1485.63 ml   Output 2690 ml   Net -1204.37 ml       Social History     Socioeconomic History    Marital status: Single     Spouse name: Not on file    Number of children: 0    Years of education: Not on file    Highest education level: Not on file   Occupational History    Not on file   Tobacco Use    Smoking status: Never Smoker    Smokeless tobacco: Never Used   Vaping Use    Vaping Use: Never used   Substance and Sexual Activity    Alcohol use: No    Drug use: No    Sexual activity: Not Currently   Other Topics Concern    Not on file   Social History Narrative    Not on file     Social Determinants of Health     Financial Resource Strain: Low Risk     Difficulty of Paying Living Expenses: Not very hard   Food Insecurity: No Food Insecurity    Worried About Running Out of Food in the Last Year: Never true    Traci of Food in the Last Year: Never true   Transportation Needs:     Lack of Transportation (Medical): Not on file    Lack of Transportation (Non-Medical):  Not on file   Physical Activity:     Days of Exercise per Week: Not on file    Minutes of Exercise per Session: Not on file PEG tube. L neph tube blocked with brown creamy drainage. Genitalia:    Padilla catheter draining clear light yellow urine  Musculoskeletal:    Normal range of motion. Extremities:    Bilateral lower extremity edema  Neurological:    Alert and oriented. Labs:  WBC:    Lab Results   Component Value Date    WBC 34.1 01/23/2022     Hemoglobin/Hematocrit:    Lab Results   Component Value Date    HGB 8.5 01/23/2022    HCT 28.3 01/23/2022     BMP:    Lab Results   Component Value Date     01/23/2022    K 3.4 01/23/2022    K 4.0 12/01/2021     01/23/2022    CO2 24 01/23/2022    BUN 76 01/23/2022    LABALBU 2.8 01/23/2022    CREATININE 2.1 01/23/2022    CALCIUM 9.6 01/23/2022    LABGLOM 23 01/23/2022       Impression/Plan:  L perinephric and renal subcapsular hematoma vs abscess--Fluid collection not large enough for drain placement. If fever/worsening clinical condition/infection symptoms consider re-imaging and possibly needle aspiration. Monitor H/H. Discussed with Critical Care 1/23 and although WBC 34 today pt's antibiotics were just escalated in last 24 hours. Will hold off on re-imaging and follow clinical course. L staghorn calculus--left neph tube exchanged 1/18/22--plan to treat stone outpatient once medically optimized  Klebsiella UTI  Hematuria--Nearly resolved. Padilla light yellow clear urine. L neph tube with brown/opaque drainage--cleared to yellow cloudy drainage after flushing. Anemia--Management per critical care. Hgb 9 1/22 after 1 u PRBC. 8.5 today. CKD--baseline creatinine 1.7-2.3 since december  Sepsis  Acute on chronic respiratory failue  Stage IV large decubitus ulcer      Neph tube blocked with creamy brown opaque drainage. Flushed with 20 mls of saline to the bag to clear debris and nephrostomy tube flushed to patient with 10 mls of saline without difficulty. Now draining cloudy yellow urine. Dressing changed.   Large amount of old brown bloody drainage and scant creamy brown drainage on dressing. Flush nephrostomy tube every 8 hours. Change nephrostomy dressing every 48 hours and PRN for soiling.      Alonzo Oreilly, APRN - [id number] [facility]  01/23/22 9:55 AM  Urology

## 2022-01-23 NOTE — PROGRESS NOTES
CRITICAL CARE PROGRESS NOTE      Patient:  Cozette Dance    Unit/Bed:4D-07/007-A  YOB: 1950  MRN: [id number]   Acct: [de-identified]   PCP: Neeru Flores MD  Date of Admission: 1/20/2022  Chief Complaint:- Acute respiratory failure    Assessment and Plan:    1. Acute on chronic respiratory failure with hypoxia and hypercapnia:  a. Arrived on BiPAP, was transitioned to HFNC, de-escalated back to 3L per NC.    b. Bilateral pleural effusions improving on Bumex gtt. 2. Sepsis  a. Leukocytosis uptrending, febrile, Procalcitonin 16.54 on 1/20  b. History of multidrug resistance, urine culture positive for Klebsiella pneumonia  c. Piperacillin/tazobactam discontinued & meropenem started on 1/22/2022.  3. Acute on chronic kidney injury, non-oligouric  a. Baseline creatinine 0.7-1.5, 2.1 this am.  Monitor. Avoid nephrotoxic agents. b. Previously on dialysis in Dec 2021.  c. Continue midodrine for hypotension  4. Retroperitoneal hematoma  a. Percutaneous nephrostomy changed on 1/12/2022  b. TEG normal on 1/22/22  c. 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 2 units of blood given 1/21/22, hemoglobin stable at 8.5 today, will hold additional transfusions for now  e. TXA given 1/21/22, holding enoxaparin  f. Urology following  5. Acute decompensated pulmonary hypertension  a. Has been off Riociquat at [place] due to bleeding risk?, will continue to hold  6. Left obstructive uropathy  a. Staghorn calculi in left kidney, percutaneous nephrostomy tube, recent change on 1/12/2022  7. Acute on chronic Anemia, stable  a. Secondary to hematoma above  b. Blood transfusions as above  c. Erythropoitin, iron replacement  8. Stage IV decubitus ulcer  a. Wound Ostomy Consulted, appreciate recommendations  9. Dysphagia  a.  Secondary to prolonged intubation, PEG tube in situ  b. Swallow Evaluation with SLP, they will continue to follow for therapy, continue NPO with nutrition via PEG. Appreciate their recommendations  10. Malnutrition  a. Continue tube feed via PEG  11. Gout  a. Continue allopurinol  12. Obstructive Sleep Apnea  a. Non-adherent to CPAP  13. ANCA Associated vasculitis  a. Consider renal biopsy once stable  14. Obesity  Body mass index is 38.52 kg/m². INITIAL H AND P AND ICU COURSE:  Zee alicea is a 63 female transferred to [facility] ICU on 1/1/2022 with acute respiratory failure, hypoxia. At time of transfer patient was on BiPAP. Past medical history significant for everyday smoker, obstructive sleep apnea, paroxysmal atrial fibrillation status post cardioversion in November 2021, pulmonary artery hypertension, HFpEF with echo on October 2021 showed EF 60-65% with trace tricuspid regurg. RV function normal, essential hypertension, depression, gout, osteoarthritis, renal calculi, thrombocytopenia, stage III sacral ulcer status post wound ostomy care. Patient was admitted to [facility] from October 27 to December 1, 2021 and was then transferred to [facility].  This hospitalization was for acute hypoxic and hypercapnic respiratory failure secondary to severe pneumonia, left-sided pleural effusion, repeated mucous plugging. Patient had tracheostomy on 11/17/2021 and was decannulated successfully on 12/23/2021. She also has a stage IV decubitus ulcer with suspected osteomyelitis, ALVIN, likely acute tubular necrosis, ANCA associated vasculitis, hydronephrosis (with a left-sided staghorn calculi. Patient had a percutaneous nephrostomy tube placed on 11/12/2021 she also has bilateral pleural effusions and had thoracentesis on 12/1/2021. Percutaneous nephrostomy tube changed on 1/12/2022    Over the past few days family noticed change in responsiveness and change in mental status. Patient have a complaint of back and rib pain.   She is noted to have an elevated troponin, she then became hypoxic, hypercapnic and was transitioned to BiPAP therapy. On 1/20/2022 per Dr. Torsten Arnold, she was transferred to the ICU for further management and care. Urine culture 1/19/22 positive for klebsiella pneumoniae. Known history of ESBL. Worsening leukocytosis. Zosyn discontinued. Meropenem initiated on 1/22. Patient stable for transfer to stepdown unit. Past medical history, family history, social history and allergies reviewed again and is unchanged since admission. ROS (12 point review of systems completed. Pertinent positives noted.  Otherwise ROS is negative)      Scheduled Meds:   multivitamin+  30 mL Oral Daily    ipratropium  0.5 mg Nebulization 4x daily    midodrine  15 mg Per G Tube Q8H    meropenem  1,000 mg IntraVENous Q12H    allopurinol  100 mg Oral Daily    aspirin  81 mg Oral Daily    famotidine  20 mg PEG Tube Daily    FLUoxetine  40 mg Oral Daily    metoclopramide  5 mg PEG Tube Q8H    metoprolol tartrate  12.5 mg Oral BID    [Held by provider] Riociguat  2.5 mg Oral TID    sodium hypochlorite   Irrigation Daily    ferrous sulfate  325 mg Oral Q MWF    epoetin prince-epbx  4,000 Units SubCUTAneous Weekly    chlorothiazide (DIURIL) IVPB  500 mg IntraVENous Q12H    sodium chloride flush  5-40 mL IntraVENous 2 times per day    [Held by provider] enoxaparin  40 mg SubCUTAneous Daily     Continuous Infusions:   sodium chloride 20 mL/hr at 01/21/22 0400    norepinephrine 2 mcg/min (01/22/22 1930)    sodium chloride      bumetanide 0.1 mg/mL infusion 0.5 mg/hr (01/22/22 1852)     PRN Meds:.docusate sodium, hydrOXYzine, senna, potassium chloride, magnesium sulfate, sodium chloride, sodium chloride flush, ondansetron **OR** ondansetron, polyethylene glycol, acetaminophen **OR** acetaminophen, sodium chloride flush     PHYSICAL EXAMINATION:  Patient Vitals for the past 8 hrs:   BP Temp Temp src Pulse Resp SpO2   01/23/22 1100 (!) 130/45 -- -- 91 24 --   01/23/22 1000 (!) 134/50 -- -- 92 20 --   01/23/22 0900 (!) 130/45 -- -- 102 24 --   01/23/22 0800 (!) 127/46 100 °F (37.8 °C) Bladder 101 24 --   01/23/22 0700 (!) 125/46 -- -- 96 24 98 %   01/23/22 0600 (!) 130/44 -- -- 95 23 98 %   01/23/22 0500 (!) 129/50 -- -- 93 24 97 %   01/23/22 0400 (!) 134/56 -- -- 93 15 99 %     I/O last 3 completed shifts: In: 2065.3 [I.V.:66.2; Blood:326.7; NG/GT:1221; IV Piggyback:451.4]  Out: 3970 [Urine:3970]   Wt Readings from Last 3 Encounters:   01/20/22 178 lb (80.7 kg)   12/01/21 168 lb 9 oz (76.5 kg)   10/25/21 164 lb (74.4 kg)      Body mass index is 38.52 kg/m². General:   Adult female, no acute distress, on 3L NC  HEENT:  Normocephalic and atraumatic. No scleral icterus. PERR  Neck: Supple. No Thyromegaly. Lungs: Rhonchorous bilaterally. Cardiac: RRR. No JVD. Abdomen: Soft. Nontender. Extremities:  No clubbing, cyanosis. Anasarca. Vasculature: capillary refill < 3 seconds. Palpable dorsalis pedis pulses. Skin:  warm and dry. Sacral wound, . Psych: Lethargic and oriented to person, place  Lymph:  No supraclavicular adenopathy. Neurologic:  No focal deficit. No seizures. ABGs:   Lab Results   Component Value Date    PH 7.33 01/21/2022    PCO2 43 01/21/2022    PO2 94 01/21/2022    HCO3 23 01/21/2022    O2SAT 97 01/21/2022     Lab Results   Component Value Date    IFIO2 40 01/21/2022    MODE PC 11/05/2021    SETPEEP 5.0 01/20/2022       DATA :- LABS, RADIOLOGY- All radiographs, tracings, PFTs, and imaging are personally viewed and interpreted unless otherwise noted).     Sodium 142, potassium 3.4, chloride 104, CO2 24, BUN 76, creatinine 2.1, AG 14, GFR 23, magnesium 1.9, , calcium 9.6, phosphorus 4.4, total protein 5.3   Albumin 2.8, alk phos 107, ALT 12, AST 14, bilirubin 0.2   WBC 34.1, RBC 2.86, Hgb 8.5, HCT 28.3, MCV 99,    TEG 1/2 grossly normal. Fibrinogen 559, Fibrin Split Products positive   CXR showing stable cardiomegaly with pulmonary vascular congestion, small left-sided pleural effusion, bilateral atelectasis/infiltrate.      Meets Continued ICU Level Care Criteria:    [] Yes   [x] No - Transfer Planned to listed location: Stepdown unit  [] HOSPITALIST CONTACTED-  (Name)      Case and plan discussed with Dr. Primitivo Huang, DO  PGY-1

## 2022-01-24 ENCOUNTER — APPOINTMENT (OUTPATIENT)
Dept: CT IMAGING | Age: 72
DRG: 981 | End: 2022-01-24
Attending: INTERNAL MEDICINE
Payer: MEDICARE

## 2022-01-24 ENCOUNTER — APPOINTMENT (OUTPATIENT)
Dept: GENERAL RADIOLOGY | Age: 72
DRG: 981 | End: 2022-01-24
Attending: INTERNAL MEDICINE
Payer: MEDICARE

## 2022-01-24 LAB
ACINETOBACTER CALCOACETICUS-BAUMANNII BY PCR: NOT DETECTED
ACINETOBACTER CALCOACETICUS-BAUMANNII BY PCR: NOT DETECTED
ADENOVIRUS BY PCR: NOT DETECTED
ADENOVIRUS BY PCR: NOT DETECTED
ALLEN TEST: POSITIVE
ANISOCYTOSIS: PRESENT
ANTIBODY SCREEN: NORMAL
BASE EXCESS (CALCULATED): -1.7 MMOL/L (ref -2.5–2.5)
BASOPHILS # BLD: 0.5 %
BASOPHILS ABSOLUTE: 0.2 THOU/MM3 (ref 0–0.1)
BLOOD CULTURE, ROUTINE: NORMAL
BLOOD CULTURE, ROUTINE: NORMAL
CHLAMYDIA PNEUMONIAE BY PCR: NOT DETECTED
CHLAMYDIA PNEUMONIAE BY PCR: NOT DETECTED
COLLECTED BY:: ABNORMAL
DEVICE: ABNORMAL
ENTEROBACTER CLOACAE COMPLEX BY PCR: NOT DETECTED
ENTEROBACTER CLOACAE COMPLEX BY PCR: NOT DETECTED
EOSINOPHIL # BLD: 0.6 %
EOSINOPHILS ABSOLUTE: 0.2 THOU/MM3 (ref 0–0.4)
ERYTHROCYTE [DISTWIDTH] IN BLOOD BY AUTOMATED COUNT: 19.1 % (ref 11.5–14.5)
ERYTHROCYTE [DISTWIDTH] IN BLOOD BY AUTOMATED COUNT: 70.4 FL (ref 35–45)
ESCHERICHIA COLI BY PCR: NOT DETECTED
ESCHERICHIA COLI BY PCR: NOT DETECTED
HAEMOPHILUS INFLUENZAE BY PCR: NOT DETECTED
HAEMOPHILUS INFLUENZAE BY PCR: NOT DETECTED
HCO3: 28 MMOL/L (ref 23–28)
HCT VFR BLD CALC: 29.5 % (ref 37–47)
HEMOGLOBIN: 8.6 GM/DL (ref 12–16)
IFIO2: 28
IMMATURE GRANS (ABS): 0.5 THOU/MM3 (ref 0–0.07)
IMMATURE GRANULOCYTES: 1.3 %
INFLUENZA A BY PCR: NOT DETECTED
INFLUENZA A BY PCR: NOT DETECTED
INFLUENZA B BY PCR: NOT DETECTED
INFLUENZA B BY PCR: NOT DETECTED
KLEBSIELLA AEROGENES BY PCR: NOT DETECTED
KLEBSIELLA AEROGENES BY PCR: NOT DETECTED
KLEBSIELLA OXYTOCA BY PCR: NOT DETECTED
KLEBSIELLA OXYTOCA BY PCR: NOT DETECTED
KLEBSIELLA PNEUMONIAE GROUP BY PCR: NOT DETECTED
KLEBSIELLA PNEUMONIAE GROUP BY PCR: NOT DETECTED
LEGIONELLA PNEUMOPHILIA BY PCR: NOT DETECTED
LEGIONELLA PNEUMOPHILIA BY PCR: NOT DETECTED
LYMPHOCYTES # BLD: 2.1 %
LYMPHOCYTES ABSOLUTE: 0.8 THOU/MM3 (ref 1–4.8)
MCH RBC QN AUTO: 29.5 PG (ref 26–33)
MCHC RBC AUTO-ENTMCNC: 29.2 GM/DL (ref 32.2–35.5)
MCV RBC AUTO: 101 FL (ref 81–99)
METAPNEUMOVIRUS BY PCR: NOT DETECTED
METAPNEUMOVIRUS BY PCR: NOT DETECTED
MONOCYTES # BLD: 5.3 %
MONOCYTES ABSOLUTE: 2 THOU/MM3 (ref 0.4–1.3)
MORAXELLA CATARRHALIS BY PCR: NOT DETECTED
MORAXELLA CATARRHALIS BY PCR: NOT DETECTED
MYCOPLASMA PNEUMONIAE BY PCR: NOT DETECTED
MYCOPLASMA PNEUMONIAE BY PCR: NOT DETECTED
NON-SARS CORONAVIRUS: NOT DETECTED
NON-SARS CORONAVIRUS: NOT DETECTED
NUCLEATED RED BLOOD CELLS: 0 /100 WBC
O2 SATURATION: 90 %
PARAINFLUENZA VIRUS BY PCR: NOT DETECTED
PARAINFLUENZA VIRUS BY PCR: NOT DETECTED
PCO2: 78 MMHG (ref 35–45)
PH BLOOD GAS: 7.16 (ref 7.35–7.45)
PLATELET # BLD: 382 THOU/MM3 (ref 130–400)
PMV BLD AUTO: 8.5 FL (ref 9.4–12.4)
PO2: 76 MMHG (ref 71–104)
PRO-BNP: ABNORMAL PG/ML (ref 0–900)
PROCALCITONIN: 5.16 NG/ML (ref 0.01–0.09)
PROTEUS SPECIES BY PCR: DETECTED
PROTEUS SPECIES BY PCR: NOT DETECTED
PSEUDOMONAS AERUGINOSA BY PCR: DETECTED
PSEUDOMONAS AERUGINOSA BY PCR: DETECTED
RBC # BLD: 2.92 MILL/MM3 (ref 4.2–5.4)
RESISTANT GENE CTX-M BY PCR: NOT DETECTED
RESISTANT GENE CTX-M BY PCR: NOT DETECTED
RESISTANT GENE IMP BY PCR: NOT DETECTED
RESISTANT GENE IMP BY PCR: NOT DETECTED
RESISTANT GENE KPC BY PCR: NOT DETECTED
RESISTANT GENE KPC BY PCR: NOT DETECTED
RESISTANT GENE MECA/C & MREJ BY PCR: ABNORMAL
RESISTANT GENE MECA/C & MREJ BY PCR: ABNORMAL
RESISTANT GENE NDM BY PCR: NOT DETECTED
RESISTANT GENE NDM BY PCR: NOT DETECTED
RESISTANT GENE OXA-48-LIKE BY PCR: ABNORMAL
RESISTANT GENE OXA-48-LIKE BY PCR: NOT DETECTED
RESISTANT GENE VIM BY PCR: NOT DETECTED
RESISTANT GENE VIM BY PCR: NOT DETECTED
RESPIRATORY SYNCYTIAL VIRUS BY PCR: NOT DETECTED
RESPIRATORY SYNCYTIAL VIRUS BY PCR: NOT DETECTED
RHINOVIRUS ENTEROVIRUS PCR: NOT DETECTED
RHINOVIRUS ENTEROVIRUS PCR: NOT DETECTED
SEG NEUTROPHILS: 90.2 %
SEGMENTED NEUTROPHILS ABSOLUTE COUNT: 33.6 THOU/MM3 (ref 1.8–7.7)
SERRATIA MARCESCENS BY PCR: NOT DETECTED
SERRATIA MARCESCENS BY PCR: NOT DETECTED
SOURCE, BLOOD GAS: ABNORMAL
SOURCE: ABNORMAL
SOURCE: ABNORMAL
SPECIMEN ACCEPTABILITY: ABNORMAL
SPECIMEN ACCEPTABILITY: ABNORMAL
STAPH AUREUS BY PCR: NOT DETECTED
STAPH AUREUS BY PCR: NOT DETECTED
STREP AGALACTIAE BY PCR: NOT DETECTED
STREP AGALACTIAE BY PCR: NOT DETECTED
STREP PNEUMONIAE BY PCR: NOT DETECTED
STREP PNEUMONIAE BY PCR: NOT DETECTED
STREP PYOGENES BY PCR: NOT DETECTED
STREP PYOGENES BY PCR: NOT DETECTED
WBC # BLD: 37.3 THOU/MM3 (ref 4.8–10.8)

## 2022-01-24 PROCEDURE — 94002 VENT MGMT INPAT INIT DAY: CPT

## 2022-01-24 PROCEDURE — 6370000000 HC RX 637 (ALT 250 FOR IP): Performed by: STUDENT IN AN ORGANIZED HEALTH CARE EDUCATION/TRAINING PROGRAM

## 2022-01-24 PROCEDURE — 71045 X-RAY EXAM CHEST 1 VIEW: CPT

## 2022-01-24 PROCEDURE — 87070 CULTURE OTHR SPECIMN AEROBIC: CPT

## 2022-01-24 PROCEDURE — 99291 CRITICAL CARE FIRST HOUR: CPT | Performed by: INTERNAL MEDICINE

## 2022-01-24 PROCEDURE — 87581 M.PNEUMON DNA AMP PROBE: CPT

## 2022-01-24 PROCEDURE — 84145 PROCALCITONIN (PCT): CPT

## 2022-01-24 PROCEDURE — 2500000003 HC RX 250 WO HCPCS: Performed by: STUDENT IN AN ORGANIZED HEALTH CARE EDUCATION/TRAINING PROGRAM

## 2022-01-24 PROCEDURE — 87486 CHLMYD PNEUM DNA AMP PROBE: CPT

## 2022-01-24 PROCEDURE — 2580000003 HC RX 258: Performed by: STUDENT IN AN ORGANIZED HEALTH CARE EDUCATION/TRAINING PROGRAM

## 2022-01-24 PROCEDURE — 87631 RESP VIRUS 3-5 TARGETS: CPT

## 2022-01-24 PROCEDURE — 87150 DNA/RNA AMPLIFIED PROBE: CPT

## 2022-01-24 PROCEDURE — 2500000003 HC RX 250 WO HCPCS: Performed by: NURSE PRACTITIONER

## 2022-01-24 PROCEDURE — 2700000000 HC OXYGEN THERAPY PER DAY

## 2022-01-24 PROCEDURE — 94660 CPAP INITIATION&MGMT: CPT

## 2022-01-24 PROCEDURE — 6360000002 HC RX W HCPCS

## 2022-01-24 PROCEDURE — 2000000000 HC ICU R&B

## 2022-01-24 PROCEDURE — 70450 CT HEAD/BRAIN W/O DYE: CPT

## 2022-01-24 PROCEDURE — 6360000002 HC RX W HCPCS: Performed by: FAMILY MEDICINE

## 2022-01-24 PROCEDURE — 6360000002 HC RX W HCPCS: Performed by: STUDENT IN AN ORGANIZED HEALTH CARE EDUCATION/TRAINING PROGRAM

## 2022-01-24 PROCEDURE — 6370000000 HC RX 637 (ALT 250 FOR IP): Performed by: NURSE PRACTITIONER

## 2022-01-24 PROCEDURE — 83880 ASSAY OF NATRIURETIC PEPTIDE: CPT

## 2022-01-24 PROCEDURE — 36600 WITHDRAWAL OF ARTERIAL BLOOD: CPT

## 2022-01-24 PROCEDURE — 2580000003 HC RX 258: Performed by: FAMILY MEDICINE

## 2022-01-24 PROCEDURE — 2580000003 HC RX 258: Performed by: NURSE PRACTITIONER

## 2022-01-24 PROCEDURE — 6360000002 HC RX W HCPCS: Performed by: NURSE PRACTITIONER

## 2022-01-24 PROCEDURE — 87798 DETECT AGENT NOS DNA AMP: CPT

## 2022-01-24 PROCEDURE — 0BH17EZ INSERTION OF ENDOTRACHEAL AIRWAY INTO TRACHEA, VIA NATURAL OR ARTIFICIAL OPENING: ICD-10-PCS | Performed by: INTERNAL MEDICINE

## 2022-01-24 PROCEDURE — 94761 N-INVAS EAR/PLS OXIMETRY MLT: CPT

## 2022-01-24 PROCEDURE — 5A1955Z RESPIRATORY VENTILATION, GREATER THAN 96 CONSECUTIVE HOURS: ICD-10-PCS | Performed by: INTERNAL MEDICINE

## 2022-01-24 PROCEDURE — 82803 BLOOD GASES ANY COMBINATION: CPT

## 2022-01-24 PROCEDURE — 87541 LEGION PNEUMO DNA AMP PROB: CPT

## 2022-01-24 PROCEDURE — 85025 COMPLETE CBC W/AUTO DIFF WBC: CPT

## 2022-01-24 PROCEDURE — 99233 SBSQ HOSP IP/OBS HIGH 50: CPT | Performed by: UROLOGY

## 2022-01-24 PROCEDURE — 94640 AIRWAY INHALATION TREATMENT: CPT

## 2022-01-24 PROCEDURE — 87205 SMEAR GRAM STAIN: CPT

## 2022-01-24 RX ORDER — MIDAZOLAM HYDROCHLORIDE 1 MG/ML
INJECTION INTRAMUSCULAR; INTRAVENOUS
Status: DISCONTINUED
Start: 2022-01-24 | End: 2022-01-24 | Stop reason: WASHOUT

## 2022-01-24 RX ORDER — ETOMIDATE 2 MG/ML
INJECTION INTRAVENOUS
Status: COMPLETED | OUTPATIENT
Start: 2022-01-24 | End: 2022-01-24

## 2022-01-24 RX ORDER — MIDAZOLAM IN NACL,ISO-OSMOT/PF 50 MG/50ML
1-10 INFUSION BOTTLE (ML) INTRAVENOUS CONTINUOUS
Status: DISCONTINUED | OUTPATIENT
Start: 2022-01-24 | End: 2022-01-30

## 2022-01-24 RX ORDER — MIDAZOLAM IN NACL,ISO-OSMOT/PF 50 MG/50ML
INFUSION BOTTLE (ML) INTRAVENOUS
Status: COMPLETED
Start: 2022-01-24 | End: 2022-01-24

## 2022-01-24 RX ORDER — BUDESONIDE AND FORMOTEROL FUMARATE DIHYDRATE 80; 4.5 UG/1; UG/1
1 AEROSOL RESPIRATORY (INHALATION) 2 TIMES DAILY
Status: DISCONTINUED | OUTPATIENT
Start: 2022-01-24 | End: 2022-02-04 | Stop reason: HOSPADM

## 2022-01-24 RX ORDER — MIDAZOLAM HYDROCHLORIDE 1 MG/ML
INJECTION INTRAMUSCULAR; INTRAVENOUS
Status: COMPLETED | OUTPATIENT
Start: 2022-01-24 | End: 2022-01-24

## 2022-01-24 RX ADMIN — IPRATROPIUM BROMIDE 0.5 MG: 0.5 SOLUTION RESPIRATORY (INHALATION) at 08:59

## 2022-01-24 RX ADMIN — CEFEPIME HYDROCHLORIDE 1000 MG: 1 INJECTION, POWDER, FOR SOLUTION INTRAMUSCULAR; INTRAVENOUS at 13:04

## 2022-01-24 RX ADMIN — IPRATROPIUM BROMIDE 0.5 MG: 0.5 SOLUTION RESPIRATORY (INHALATION) at 12:32

## 2022-01-24 RX ADMIN — Medication 2 MCG/MIN: at 14:10

## 2022-01-24 RX ADMIN — SODIUM CHLORIDE, PRESERVATIVE FREE 10 ML: 5 INJECTION INTRAVENOUS at 09:03

## 2022-01-24 RX ADMIN — Medication 1 MG/HR: at 04:07

## 2022-01-24 RX ADMIN — ACETAMINOPHEN 650 MG: 325 TABLET ORAL at 22:27

## 2022-01-24 RX ADMIN — IPRATROPIUM BROMIDE 0.5 MG: 0.5 SOLUTION RESPIRATORY (INHALATION) at 00:47

## 2022-01-24 RX ADMIN — ASPIRIN 81 MG CHEWABLE TABLET 81 MG: 81 TABLET CHEWABLE at 08:57

## 2022-01-24 RX ADMIN — ALLOPURINOL 100 MG: 100 TABLET ORAL at 09:00

## 2022-01-24 RX ADMIN — ETOMIDATE 20 MG: 2 INJECTION INTRAVENOUS at 04:01

## 2022-01-24 RX ADMIN — Medication 30 ML: at 09:00

## 2022-01-24 RX ADMIN — BUMETANIDE 1 MG/HR: 0.25 INJECTION INTRAMUSCULAR; INTRAVENOUS at 23:55

## 2022-01-24 RX ADMIN — FAMOTIDINE 20 MG: 20 TABLET, FILM COATED ORAL at 08:57

## 2022-01-24 RX ADMIN — BUDESONIDE AND FORMOTEROL FUMARATE DIHYDRATE 1 PUFF: 80; 4.5 AEROSOL RESPIRATORY (INHALATION) at 17:47

## 2022-01-24 RX ADMIN — CHLOROTHIAZIDE SODIUM 500 MG: 500 INJECTION, POWDER, LYOPHILIZED, FOR SOLUTION INTRAVENOUS at 10:51

## 2022-01-24 RX ADMIN — FERROUS SULFATE TAB 325 MG (65 MG ELEMENTAL FE) 325 MG: 325 (65 FE) TAB at 16:58

## 2022-01-24 RX ADMIN — MEROPENEM 500 MG: 500 INJECTION, POWDER, FOR SOLUTION INTRAVENOUS at 20:50

## 2022-01-24 RX ADMIN — SODIUM CHLORIDE, PRESERVATIVE FREE 10 ML: 5 INJECTION INTRAVENOUS at 20:47

## 2022-01-24 RX ADMIN — MIDODRINE HYDROCHLORIDE 15 MG: 5 TABLET ORAL at 13:08

## 2022-01-24 RX ADMIN — MEROPENEM 1000 MG: 1 INJECTION, POWDER, FOR SOLUTION INTRAVENOUS at 05:55

## 2022-01-24 RX ADMIN — METOCLOPRAMIDE HYDROCHLORIDE 5 MG: 5 SOLUTION ORAL at 02:13

## 2022-01-24 RX ADMIN — CHLOROTHIAZIDE SODIUM 500 MG: 500 INJECTION, POWDER, LYOPHILIZED, FOR SOLUTION INTRAVENOUS at 03:01

## 2022-01-24 RX ADMIN — BUMETANIDE 1 MG/HR: 0.25 INJECTION INTRAMUSCULAR; INTRAVENOUS at 10:52

## 2022-01-24 RX ADMIN — MIDODRINE HYDROCHLORIDE 15 MG: 5 TABLET ORAL at 22:27

## 2022-01-24 RX ADMIN — DAKIN'S SOLUTION 0.125% (QUARTER STRENGTH): 0.12 SOLUTION at 16:58

## 2022-01-24 RX ADMIN — FLUOXETINE HYDROCHLORIDE 40 MG: 20 CAPSULE ORAL at 09:00

## 2022-01-24 RX ADMIN — CHLOROTHIAZIDE SODIUM 500 MG: 500 INJECTION, POWDER, LYOPHILIZED, FOR SOLUTION INTRAVENOUS at 22:29

## 2022-01-24 RX ADMIN — METOCLOPRAMIDE HYDROCHLORIDE 5 MG: 5 SOLUTION ORAL at 09:00

## 2022-01-24 RX ADMIN — METOCLOPRAMIDE HYDROCHLORIDE 5 MG: 5 SOLUTION ORAL at 16:58

## 2022-01-24 RX ADMIN — MIDAZOLAM 4 MG: 1 INJECTION INTRAMUSCULAR; INTRAVENOUS at 04:00

## 2022-01-24 ASSESSMENT — PULMONARY FUNCTION TESTS
PIF_VALUE: 22
PIF_VALUE: 26
PIF_VALUE: 21
PIF_VALUE: 25
PIF_VALUE: 22

## 2022-01-24 ASSESSMENT — PAIN SCALES - GENERAL: PAINLEVEL_OUTOF10: 3

## 2022-01-24 NOTE — PROGRESS NOTES
Urology Progress Note    Chief Complaint: Acute respiratory failure  Reason for consultation: hematuria, abnormal CT results    Subjective: Intubated this AM. Left nephrostomy tube not currently draining--not brown/creamy/opaque drainage in tubing and light scan amount of tea colored urine in bag. Being flushed every 8 hrs. Padilla with light yellow clear urine. Afebrile. WBC up to 37.3. On cefepime         Vitals:  BP (!) 110/42   Pulse 74   Temp 98.8 °F (37.1 °C) (Bladder)   Resp 20   Ht 4' 9\" (1.448 m)   Wt 172 lb 2.9 oz (78.1 kg)   SpO2 100%   BMI 37.26 kg/m²   Temp  Av.6 °F (37 °C)  Min: 97.5 °F (36.4 °C)  Max: 99.1 °F (37.3 °C)    Intake/Output Summary (Last 24 hours) at 2022 1306  Last data filed at 2022 1017  Gross per 24 hour   Intake 2872.36 ml   Output 2060 ml   Net 812.36 ml       Social History     Socioeconomic History    Marital status: Single     Spouse name: Not on file    Number of children: 0    Years of education: Not on file    Highest education level: Not on file   Occupational History    Not on file   Tobacco Use    Smoking status: Never Smoker    Smokeless tobacco: Never Used   Vaping Use    Vaping Use: Never used   Substance and Sexual Activity    Alcohol use: No    Drug use: No    Sexual activity: Not Currently   Other Topics Concern    Not on file   Social History Narrative    Not on file     Social Determinants of Health     Financial Resource Strain: Low Risk     Difficulty of Paying Living Expenses: Not very hard   Food Insecurity: No Food Insecurity    Worried About Running Out of Food in the Last Year: Never true    Traci of Food in the Last Year: Never true   Transportation Needs:     Lack of Transportation (Medical): Not on file    Lack of Transportation (Non-Medical):  Not on file   Physical Activity:     Days of Exercise per Week: Not on file    Minutes of Exercise per Session: Not on file   Stress:     Feeling of Stress : Not on file   Social Connections:     Frequency of Communication with Friends and Family: Not on file    Frequency of Social Gatherings with Friends and Family: Not on file    Attends [employer]ism Services: Not on file    Active Member of Clubs or Organizations: Not on file    Attends Club or Organization Meetings: Not on file    Marital Status: Not on file   Intimate Partner Violence:     Fear of Current or Ex-Partner: Not on file    Emotionally Abused: Not on file    Physically Abused: Not on file    Sexually Abused: Not on file   Housing Stability:     Unable to Pay for Housing in the Last Year: Not on file    Number of Places Lived in the Last Year: Not on file    Unstable Housing in the Last Year: Not on file     Family History   Problem Relation Age of Onset    Diabetes Father     Arthritis Mother     COPD Mother     Diabetes Sister     Heart Disease Maternal Uncle     Breast Cancer Niece 36    Sleep Apnea Brother     Asthma Neg Hx     Birth Defects Neg Hx     Cancer Neg Hx     Depression Neg Hx     Early Death Neg Hx     Hearing Loss Neg Hx     High Blood Pressure Neg Hx     High Cholesterol Neg Hx     Kidney Disease Neg Hx     Learning Disabilities Neg Hx     Mental Illness Neg Hx     Mental Retardation Neg Hx     Miscarriages / Stillbirths Neg Hx     Stroke Neg Hx     Substance Abuse Neg Hx     Vision Loss Neg Hx     Other Neg Hx      No Known Allergies      Constitutional: Intubated/sedated chronically ill appearing. HEENT:   Head:         Normocephalic and atraumatic. Mucous membranes are normal.   Eyes:         EOM are normal. No scleral icterus. Nose:    The external appearance of the nose is normal  Ears: The ears appear normal to external inspection. Cardiovascular:       Normal rate, regular rhythm. Pulmonary/Chest:  Lungs diminished. Scattered rhonchi  Abdominal:          No tenderness. PEG tube.   L neph tube draining with brown creamy drainage. Genitalia:    Padilla catheter draining clear light yellow urine  Musculoskeletal:    Normal range of motion. Extremities:    Bilateral lower extremity edema  Neurological:    Intubated/sedated     Labs:  WBC:    Lab Results   Component Value Date    WBC 37.3 01/24/2022     Hemoglobin/Hematocrit:    Lab Results   Component Value Date    HGB 8.6 01/24/2022    HCT 29.5 01/24/2022     BMP:    Lab Results   Component Value Date     01/23/2022    K 3.4 01/23/2022    K 4.0 12/01/2021     01/23/2022    CO2 24 01/23/2022    BUN 76 01/23/2022    LABALBU 2.8 01/23/2022    CREATININE 2.1 01/23/2022    CALCIUM 9.6 01/23/2022    LABGLOM 23 01/23/2022       Impression/Plan:  L perinephric and renal subcapsular hematoma vs abscess--Fluid collection not large enough for drain placement. If fever/worsening clinical condition/infection symptoms consider re-imaging and possibly needle aspiration. Monitor H/H. Discussed with Critical Care 1/24 and although WBC 37.3 today pt's antibiotics were just escalated in last 24 hours. Will hold off on re-imaging and follow clinical course. L staghorn calculus--left neph tube exchanged 1/18/22--plan to treat stone outpatient once medically optimized  Klebsiella UTI  Hematuria--Nearly resolved. Padilla light yellow clear urine. L neph tube with brown/opaque drainage--cleared to yellow cloudy drainage after flushing. Flush every 8 hrs  Anemia--Management per critical care. Hgb 9 1/22 after 1 u PRBC. 8.6 today. CKD--baseline creatinine 1.7-2.3 since december  Sepsis  Acute on chronic respiratory failue  Stage IV large decubitus ulcer      Neph tube draining creamy brown opaque drainage. Flushed every 8 hrs per nursing. Change nephrostomy dressing every 48 hours and PRN for soiling.      LANE Al - CNP  01/24/22 1:06 PM  Urology

## 2022-01-24 NOTE — PROGRESS NOTES
BP remains low s/p administration of Midodrine.  made aware. He stated to restart levophed. Levophed started Will continue to monitor.

## 2022-01-24 NOTE — PROGRESS NOTES
[address] [facility] THERAPY MISSED TREATMENT NOTE  [facility] ICU 4D      Date: 2022  Patient Name: Karl Ching        MRN: [id number]    : 1950  (73 y.o.)    REASON FOR MISSED TREATMENT:  ST attempted to see patient this AM for completion of dysphagia tx;however, per discussion with RN Michael Isaacs, patient re-intubated this AM. ST to re-attempt at a later date/time as patient is medically appropriate and available.      [facility]) [address]

## 2022-01-24 NOTE — PROGRESS NOTES
CRITICAL CARE PROGRESS NOTE      Patient:  Mohini Abad    Unit/Bed:4D-07/007-A  YOB: 1950  MRN: [id number]   Acct: [de-identified]   PCP: Shadi Oro MD  Date of Admission: 1/20/2022  Chief Complaint:- Acute respiratory failure    Assessment and Plan:    Acute on chronic respiratory failure with hypoxia and hypercapnia:  Intubated on 1/24/2022 for airway protection as patient was obtunded, no cough reflex  Extubation precluded by ventilator settings, wean today, plan for spontaneous breathing trial tomorrow  CT head obtained for unequal pupils following intubation. Pseudomonal pneumonia  Pseudomonas aeruginosa on molecular pneumonia panel, culture and sensitivity pending  Day 2 meropenum  Urinary tract infection  Klebsiella pneumoniae, proteus mirabilis (probable ESBL )  Day 2 meropenum   Retroperitoneal hematoma  Percutaneous nephrostomy changed on 1/12/2022  Stable hemoglobin  Urology Consulted  Sepsis  Elevated WBC, Procalcitonin down trending  Acute on chronic kidney injury, non-oligouric  Baseline creatinine 0.7-1.5, 2.0 this am  Previously on dialysis in Dec 2021. Acute decompensated pulmonary hypertension  Has been off Riociquat at [place] due to bleeding risk?, will continue to hold  Diueresis with bumetinide infusion  Left obstructive uropathy  Staghorn calculi in left kidney, percutaneous nephrostomy tube, recent change on 1/12/2022  Urology Consulted  Acute on chronic Anemia  Secondary to hematoma above  Blood transfusions as above  Erythropoitin, iron replacement  Stage IV decubitus ulcer  Wound Ostomy Consulted, appreciate recommendations  Dysphagia  Secondary to prolonged intubation, PEG tube in situ  Swallow Evaluation with SLP yesterday, they will continue to follow for therapy, continue NPO with nutrition via PEG.  Appreciate their recommendations  Malnutrition  Continue tube feed via PEG  Gout  Noted continue allopurinol  Obstructive Sleep Apnea  Non-adherent to CPAP  Chronic tobacco use  Encourage smoking cessation  ANCA Associated vasculitis  Consider renal biopsy once stable  Obesity  Body mass index is 37.26 kg/m². INITIAL H AND P AND ICU COURSE:  Jia alicea is a 63 female transferred to [facility] ICU on 1/1/2022 with acute respiratory failure, hypoxia. At time of transfer patient was on BiPAP. Past medical history significant for everyday smoker, obstructive sleep apnea, paroxysmal atrial fibrillation status post cardioversion in November 2021, pulmonary artery hypertension, HFpEF with echo on October 2021 showed Function 60-65% with trace tricuspid regurg. RV function normal, essential hypertension, depression, gout, osteoarthritis, renal calculi, thrombocytopenia, stage III sacral ulcer status post wound ostomy care. Patient was admitted to [facility] from October 27 to December 1, 2021 and was then transferred to [facility].  This hospitalization was for acute hypoxic and hypercapnic respiratory failure secondary to severe pneumonia, left-sided pleural effusion, repeated mucous plugging. Patient had tracheostomy on 11/17/2021 and was decannulated successfully on 12/23/2021. She also stage IV decubitus ulcer with suspected osteomyelitis, ALVIN, likely acute tubular necrosis, ANCA associated vasculitis, hydro (with a left-sided staghorn calculi. Patient had a percutaneous nephrostomy tube placed on 11/12/2021 she also has bilateral pleural effusions and had thoracentesis on 12/1/2021. Percutaneous nephrostomy tube changed on 1/12/2022    Over the past few days family noticed change in responsiveness and change in mental status. Patient have a complaint of back and rib pain. She is noted to have an elevated troponin, she then became hypoxic, hypercapnic and was transitioned to BiPAP therapy. On 1/20/2022 per Dr. Micaela Ackerman, she was transferred to the ICU for further management and care. Subjective:- (Last 24 hours)  Slept okay last night. Pain around nephrostomy tube site. Seen by Urology and it was pathology yesterday. Patient sleeping, and easily awoken. Past medical history, family history, social history and allergies reviewed again and is unchanged since admission. ROS (12 point review of systems completed. Pertinent positives noted.  Otherwise ROS is negative)      Scheduled Meds:   multivitamin+  30 mL Oral Daily    ipratropium  0.5 mg Nebulization 4x daily    midodrine  15 mg Per G Tube Q8H    meropenem  1,000 mg IntraVENous Q12H    allopurinol  100 mg Oral Daily    aspirin  81 mg Oral Daily    famotidine  20 mg PEG Tube Daily    FLUoxetine  40 mg Oral Daily    metoclopramide  5 mg PEG Tube Q8H    [Held by provider] Riociguat  2.5 mg Oral TID    sodium hypochlorite   Irrigation Daily    ferrous sulfate  325 mg Oral Q MWF    epoetin prince-epbx  4,000 Units SubCUTAneous Weekly    chlorothiazide (DIURIL) IVPB  500 mg IntraVENous Q12H    sodium chloride flush  5-40 mL IntraVENous 2 times per day    [Held by provider] enoxaparin  40 mg SubCUTAneous Daily     Continuous Infusions:   midazolam 2 mg/hr (01/24/22 0416)    sodium chloride 20 mL/hr at 01/23/22 2053    norepinephrine 2 mcg/min (01/22/22 1930)    sodium chloride      bumetanide 0.1 mg/mL infusion 1 mg/hr (01/24/22 0207)     PRN Meds:.docusate sodium, hydrOXYzine, senna, potassium chloride, magnesium sulfate, sodium chloride, sodium chloride flush, ondansetron **OR** ondansetron, polyethylene glycol, acetaminophen **OR** acetaminophen, sodium chloride flush     PHYSICAL EXAMINATION:  Patient Vitals for the past 8 hrs:   BP Temp Pulse Resp SpO2 Weight   01/24/22 0424 (!) 111/45 -- 71 16 100 % --   01/24/22 0421 (!) 105/53 -- 69 16 99 % --   01/24/22 0418 103/86 -- 69 16 99 % --   01/24/22 0415 (!) 100/52 -- 72 14 99 % --   01/24/22 0414 -- -- 72 16 100 % --   01/24/22 0412 (!) 112/47 -- 79 17 99 % --   01/24/22 0409 110/66 -- 79 13 99 % --   01/24/22 0406 (!) 102/42 -- 81 18 99 % -- 01/24/22 0403 117/62 -- 90 -- -- --   01/24/22 0403 117/62 -- 80 21 100 % --   01/24/22 0400 (!) 120/55 97.5 °F (36.4 °C) 69 22 100 % --   01/24/22 0357 (!) 103/42 -- -- -- -- --   01/24/22 0330 (!) 110/43 -- 69 14 100 % --   01/24/22 0324 -- -- -- -- -- 172 lb 2.9 oz (78.1 kg)   01/24/22 0300 (!) 123/59 -- 74 12 100 % --   01/24/22 0200 (!) 116/56 -- 77 15 99 % --   01/24/22 0130 125/66 -- 80 17 99 % --   01/24/22 0100 (!) 112/52 -- 81 14 99 % --   01/24/22 0050 -- -- -- 22 99 % --   01/24/22 0047 -- -- -- 14 100 % --   01/24/22 0030 (!) 111/48 98.8 °F (37.1 °C) 81 15 99 % --   01/24/22 0000 (!) 143/75 -- 96 28 (!) 81 % --   01/23/22 2330 (!) 111/43 -- 79 16 98 % --   01/23/22 2300 (!) 131/54 -- 87 20 97 % --   01/23/22 2230 (!) 128/49 -- 86 18 97 % --   01/23/22 2200 (!) 112/52 -- 81 16 99 % --   01/23/22 2130 (!) 113/50 -- 78 14 98 % --     I/O last 3 completed shifts: In: 2172.6 [I.V.:316.2; NG/GT:1405; IV Piggyback:451.4]  Out: 2897 [Urine:3370]   Wt Readings from Last 3 Encounters:   01/24/22 172 lb 2.9 oz (78.1 kg)   12/01/21 168 lb 9 oz (76.5 kg)   10/25/21 164 lb (74.4 kg)      Body mass index is 37.26 kg/m². CAM-ICU:   RASS 0    General:   Adult female, intubated, eyes open, not following commands  HEENT:  Normocephalic and atraumatic. No scleral icterus. Left pupil 5mm, Right pupil 4mm  Neck: Supple. No Thyromegaly. Lungs: Clear to auscultation. No retractions  Cardiac: RRR. No JVD. Abdomen: Soft. Nontender. Extremities:  No clubbing, cyanosis, or edema x 4. Vasculature: capillary refill < 3 seconds. Palpable dorsalis pedis pulses. Skin:  warm and dry. Sacral wound, . Psych:  Patient intubated, sedated. .  Lymph:  No supraclavicular adenopathy. Neurologic:  No focal deficit. No seizures.       CURRENT PARENTERAL VASOACTIVE / INOTROPIC AGENTS:  [x] None Vasopressors:  [] Norepinephrine  [] Dopamine  [] Phenylephrine  [] Vasopressin  [] Epinephrine  [] Other: Sedation:  [] No Sedation  [] Propofol gtt-    [] BZD gtt -    [] Opiate gtt  -    [] Dexmedetomidine  [] Paralytics Antihypertensives gtt  [] Ca Channel Antagonist:  [] Beta-blocker:  [] Nitroglycerin  [] Nitroprusside       SUPPORT DEVICES:  [x] ETT   [] Tracheostomy    MODE:-  [x]PC           []CPAP             []BILEVEL-PAP    Pressure Control 20, PEEP 6, FiO2 50%  Tidal Volume 337, RR 16, Peak Pressure 26    ABGs:   Lab Results   Component Value Date    PH 7.16 01/24/2022    PCO2 78 01/24/2022    PO2 76 01/24/2022    HCO3 28 01/24/2022    O2SAT 90 01/24/2022     Lab Results   Component Value Date    IFIO2 28 01/24/2022    MODE PC 11/05/2021    SETPEEP 5.0 01/20/2022       NUTRITION:  [x] Gastric Tube [] OG / [] NG  [x] TUBE FEEDS  [] TPN    [] Rectal Tube    CENTRAL LINES/CHEMOPORT/TUNNEL CATH:-    [] No   [x] Yes   (Date 1/20/2022 )           If yes -    [] Right IJ   [] Left IJ   [] Right Femoral [] Left Femoral       [] Right Subclavian [] Left Subclavian  [] Peripheral IV access  [x] PICC  [] Arterial Line (Specify Site)    BERRIOS CATHETER:-   [] No  [x] Yes  (Date  1/20/2022)     ICU PROPHYLAXIS/THERAPY:   Stress ulcer:  [] PPI Agent  [] H2RA [] Sucralfate [] Other:   VTE:     [] Enoxaparin    [] Warfarin [] NOAC [x] PCD Device:Bilat LE   [] Heparin: [] Subcut / [] IV     DATA :- LABS, RADIOLOGY- All radiographs, tracings, PFTs, and imaging are personally viewed and interpreted unless otherwise noted).    Sodium sodium 142, potassium 3.4, BUN 76, creatinine 2.1, magnesium 1.9, phosphorus 4.4, glucose 114  Procalcitonin 5.16  BNP 86258  WBC 37.3, hemoglobin 8.6, platelets 758  Molecular pneumonia panel collected today, positive for Pseudomonas aeruginosa  Molecular panel collected yesterday positive for Pseudomonas aeruginosa and Proteus species  Urinalysis collected on 1/20, positive today for Klebsiella pneumonia, CFU 1000       CONSULTS:-  [] Cardiology  [] Nephrology    [] Hemo onco   [] GI   [] ID   [] Endocrine  [] Pulmo      [] Neuro    [] Psych   [x] Urology  [] ENT   [x] General SURGERY   []Ortho    []CV surg        [] Palliative  [] Hospice [] Pain management   []      []TCU   [] PT/OT  OTHERS:- Wound Ostomy, Speech and Language Pathology, Dietitian    CODE STATUS:-  [x] Full resuscitation [] DNR-Comfort Care-Arrest  [] DNR-Comfort Care   [] Limited Resuscitation   [] No ET intubation   [] No CPR   [] No shock for non-perfusing rhythm  [] No resuscitative medications    Meets Continued ICU Level Care Criteria:    [x] Yes   [] No - Transfer Planned to listed location:  [] HOSPITALIST CONTACTED-  (Name)      Case and plan discussed with Dr. Sonny Yap MD  PGY-1 Emergency Medicine  Patient seen by me. Case discussed with resident physician. See significant event. Italicized font represents changes to the note made by me. CC time 35 minutes. Time was discontiguous. Time does not include procedures. Time does include my direct assessment of the patient and coordination of care.   Electronically signed by Elyssa Maguire MD on 1/25/2022 at 5:58 AM

## 2022-01-24 NOTE — CARE COORDINATION
1/24/22, 3:54 PM EST    DISCHARGE ON GOING EVALUATION    [facility] day: 4  Location: -07/007-A Reason for admit: Acute on chronic respiratory failure with hypoxia [facility]) [J96.21]   Procedure:   1/18 Left nephrostomy tube exchanged  1/20 PICC right   1/20 CT Chest: Bilateral pleural effusions and atelectasis with cardiomegaly; question pulmonary edema  1/20 CT Abd/pelvis: Left perinephric and renal subcapsular hematomas; Loculated left retroperitoneal fluid, question hematoma versus abscess; Anasarca pattern noted with body wall edema  1/20 US GB/RUQ: Cholelithiasis considerable ductal dilation  1/21 BLE Venous doppler: Neg for DVT  1/21 Echo with EF 60%  1/24 CT Head: No acute findings  1/24 Intubated  1/24 CXR: Improving heart failure    Barriers to Discharge: Wound Care recommending diverting ostomy for stage 4 coccyx wound. Had 2 episodes of desaturations overnight, both after coughing fit, the 2nd required NRB mask and was unable to wean back to NC O2. PCO2 was elevated on ABG and was trialed on bipap and given dose of diuril and bumex was increased. Required intubation early this morning d/t inability to protect airway. Remains on vent w/ETT on PCV, peep 6, FIO2 40%, sats 98%. Tmax 99.1. NSR. Oriented to person and place. Follows commands. Flush Nephrostomy tube Q8H. Dietitian and Wound Care following. Respiratory culture +Pseudomonas aeruginosa. Urine +Klebsiella pneumoniae. SLP/PT/OT. Telemetry, PICC, PEG w/TF, nephrostomy tube w/drainage brown/milky/hazy, seaman, wound care, SCDs. Bumex @ 1 mg/hr, versed @ 2 mg/hr, love @ 2 mcg/min, allopurinol, asa, inhaler, IV cefepime, IV diuril, sq retacrit, pepcid, ferrous sulfate, prozac, reglan, midodrine, dakins daily, Electrolyte replacement protocols. Procal 5.16, pro-bnp 43602, wbc 37.3, hgb 8.6. PCP: Shadi Oro MD  Readmission Risk Score: 24.8 ( )%  Patient Goals/Plan/Treatment Preferences: From [place]; does not want to return. Plan for SNF; referrals out to [facility] choice, and [facility].

## 2022-01-24 NOTE — PLAN OF CARE
Patient had 2 episodes of desaturation with the first episode after coughing fit the second episode requiring a nonrebreather at 100% FiO2 but was able to be weaned back down to nasal cannula. Both desaturation events were preceded by coughing. CXR was obtained and shows improvement of pulmonary vessel dilation suggesting that diuresis is working. There still remains infiltrates bilaterally, and there may be concern for subsequent pneumonia. Procalcitonin being ordered for the morning however this may still be rising due to inadequately treated UTI. Patient was placed on Merrem today. She was noted to have Pseudomonas pneumonia in 11/2021. And other prior pneumonias involving a non-MRSA coag positive staph. Due to this, Remy Castaneda should cover most likely causes of pneumonia in this patient. Patient was given an extra dose of Atrovent to help clear secretions. Of note the CXR does show what appears to be nodules in the lungs, may be related to gout or ANCA positive vasculitis. Addendum:  Since the patient was still lethargic despite being placed back on 2 L nasal cannula with good sats, an ABG was ordered which showed PCO2 of 78 which is higher than the PCO2 of 60 on the previous night. Patient was chosen to have a 6-hour trial of BiPAP to see if CO2 improves and if mentation improves. If she does not improve on BiPAP, patient will need to be intubated. Also giving a another dose of Diuril and increasing Bumex to 1 mg for the duration of the BiPAP. Patient's pulmonary hypertension is likely contributing to her disease process. However on literature review, will medications to treat this have an increased bleeding risk or decrease the activity of platelets. With the recent retroperitoneal hematoma, patient may not be a candidate. If you would like to start a medication, she will need a rescan of the abdomen in order to ensure that the bleeding is stable.     Stevphen Claude,

## 2022-01-24 NOTE — PROGRESS NOTES
[facility] THERAPY MISSED TREATMENT NOTE  [facility] ICU 4D  4D-007-A      Date: 2022  Patient Name: Tamera Louise        CSN: [id number]   : 1950  (73 y.o.)  Gender: female                REASON FOR MISSED TREATMENT: Hold Treatment per Nursing; patient was just re-intubated early this a.m., and is minimally alert/following commands. OT to hold this date and check back another day.

## 2022-01-24 NOTE — PROCEDURES
ICU PROCEDURE - ENDOTRACHEAL INTUBATION    Celina Paredes     MRN#: [id number]  22      Acct #: [de-identified]    : 1950      INDICATION: Inability to Protect Airway    TIME OUT: Taken    Permission obtained, risks/benefits reviewed, patient not alert to be able to consent, obtained from [address] [place] [address] over phone. ANESTHESIA:   []Ketamine  [x]Ativan  [] Morphine  []Propofol  [x]Other medications: Etomidate      ESTIMATED BLOOD LOSS:  None. COMPLICATIONS:  []N/A  [x] Other:  Initially Right mainstem however pulling good tidal volumes, adjusted after confirmatory CXR. LARYNGOSCOPIC AIRWAY GRADE (CORMACK-LEHANE):[]1  [x]2a  []2b []3  []4    Noted scarring around airway likely from prior intubations, bilious material coming from esophagous. INTUBATION EQUIPMENT USED:  [x] Direct laryngoscope only, used glidescope    OUTCOME: Successful placement of # 7.5  Taperguard Evac endotracheal via   [x]Oral route    INSERTION DEPTH:  21 cm from   [x]lip     CONFIRMATION OF TUBE POSITION:   [x]Capnography - Strong & repeatable exhaled CO2 detection   [x]Multiple point auscultation   [x]SpO2 response   [x]STAT X-ray   []Bronchoscopic assessment    UNUSUAL FINDINGS:  Initially the tube was placed at 25 cm at the lip, however this was in the right mainstem and was subsequently pulled back to 21 cm at the lip. PROCEDURE:     Preformed with Mariana Mai CNP. Using direct laryngoscopy, the vocal cords were visualized and the endotracheal tube was placed through the cords under direct vision. Good breath sounds were auscultated bilaterally without sounds over abdomen. Appropriate strong & repeatable exhaled CO2 detection was confirmed.        Electronically signed by Alexandro Merrill DO on 2022 at 4:18 AM

## 2022-01-24 NOTE — PROGRESS NOTES
[facility] [address]  PHYSICAL THERAPY MISSED TREATMENT NOTE  [facility] ICU 4D    Date: 2022  Patient Name: Tamera Louise        MRN: [id number]   : 1950  (77 y.o.)  Gender: female                REASON FOR MISSED TREATMENT:  Patient intubated early this morning, and is minimally alert/following commands. PT to hold this date and check back another day as pt medically appropriate for early mobility.     Douglas Fierro PT, DPT

## 2022-01-24 NOTE — SIGNIFICANT EVENT
Patient seen by me. Case discussed with resident physician. Patient remains critically ill on mechanical ventilator. Pseudomonas aeruginosa growing from the sputum. Patient on pressure control 16 with a respiratory rate of 21 and a tidal volume of 360. Unable to wean further. Continue with mechanical ventilator support and lung protection strategies. On low-dose pressors. Continue to diurese. On cefepime.  midazolam 2 mg/hr (01/24/22 1017)    fentaNYL 500 mcg in dextrose 5% 100 ml infusion      sodium chloride 20 mL/hr at 01/23/22 2223    norepinephrine 2 mcg/min (01/22/22 1930)    sodium chloride      bumetanide 0.1 mg/mL infusion 1 mg/hr (01/24/22 1052)      cefepime  1,000 mg IntraVENous Q24H    multivitamin+  30 mL Oral Daily    ipratropium  0.5 mg Nebulization 4x daily    midodrine  15 mg Per G Tube Q8H    allopurinol  100 mg Oral Daily    aspirin  81 mg Oral Daily    famotidine  20 mg PEG Tube Daily    FLUoxetine  40 mg Oral Daily    metoclopramide  5 mg PEG Tube Q8H    [Held by provider] Riociguat  2.5 mg Oral TID    sodium hypochlorite   Irrigation Daily    ferrous sulfate  325 mg Oral Q MWF    epoetin prince-epbx  4,000 Units SubCUTAneous Weekly    chlorothiazide (DIURIL) IVPB  500 mg IntraVENous Q12H    sodium chloride flush  5-40 mL IntraVENous 2 times per day    [Held by provider] enoxaparin  40 mg SubCUTAneous Daily   . Italicized font represents changes to the note made by me. CC time 35 minutes. Time was discontiguous. Time does not include procedures. Time does include my direct assessment of the patient and coordination of care.   Electronically signed by Fain Nissen, MD on 1/24/2022 at 1:58 PM

## 2022-01-24 NOTE — FLOWSHEET NOTE
01/24/22 1033   Encounter Summary   Services provided to: Patient   Referral/Consult From: [facility]   Continue Visiting Yes  (1/24/22)   Complexity of Encounter Low   Length of Encounter 15 minutes   Routine   Type Follow up   Assessment Unable to respond   Intervention Prayer   Outcome Did not respond   In my encounter with the 73 yr old patient, I attempted to see the patient, but they were unresponsive at this time. No family was present in the room. I offered a prayer at the pts side. A  will attempt to see the patient at a later time as a follow up. The pt was admitted due to respiratory failure.

## 2022-01-25 ENCOUNTER — TELEPHONE (OUTPATIENT)
Dept: SURGERY | Age: 72
End: 2022-01-25

## 2022-01-25 LAB
ANION GAP SERPL CALCULATED.3IONS-SCNC: 14 MEQ/L (ref 8–16)
ANISOCYTOSIS: PRESENT
BASOPHILS # BLD: 0.6 %
BASOPHILS ABSOLUTE: 0.2 THOU/MM3 (ref 0–0.1)
BUN BLDV-MCNC: 83 MG/DL (ref 7–22)
CALCIUM SERPL-MCNC: 9.5 MG/DL (ref 8.5–10.5)
CHLORIDE BLD-SCNC: 103 MEQ/L (ref 98–111)
CO2: 25 MEQ/L (ref 23–33)
CREAT SERPL-MCNC: 2.2 MG/DL (ref 0.4–1.2)
EOSINOPHIL # BLD: 2 %
EOSINOPHILS ABSOLUTE: 0.6 THOU/MM3 (ref 0–0.4)
ERYTHROCYTE [DISTWIDTH] IN BLOOD BY AUTOMATED COUNT: 18.7 % (ref 11.5–14.5)
ERYTHROCYTE [DISTWIDTH] IN BLOOD BY AUTOMATED COUNT: 65.5 FL (ref 35–45)
GFR SERPL CREATININE-BSD FRML MDRD: 22 ML/MIN/1.73M2
GLUCOSE BLD-MCNC: 126 MG/DL (ref 70–108)
GRAM STAIN RESULT: ABNORMAL
HCT VFR BLD CALC: 29.9 % (ref 37–47)
HEMOGLOBIN: 9.1 GM/DL (ref 12–16)
IMMATURE GRANS (ABS): 0.47 THOU/MM3 (ref 0–0.07)
IMMATURE GRANULOCYTES: 1.6 %
LYMPHOCYTES # BLD: 3.5 %
LYMPHOCYTES ABSOLUTE: 1 THOU/MM3 (ref 1–4.8)
MCH RBC QN AUTO: 29.7 PG (ref 26–33)
MCHC RBC AUTO-ENTMCNC: 30.4 GM/DL (ref 32.2–35.5)
MCV RBC AUTO: 97.7 FL (ref 81–99)
MONOCYTES # BLD: 5.3 %
MONOCYTES ABSOLUTE: 1.5 THOU/MM3 (ref 0.4–1.3)
NUCLEATED RED BLOOD CELLS: 0 /100 WBC
ORGANISM: ABNORMAL
PLATELET # BLD: 410 THOU/MM3 (ref 130–400)
PMV BLD AUTO: 8.7 FL (ref 9.4–12.4)
POTASSIUM SERPL-SCNC: 3 MEQ/L (ref 3.5–5.2)
RBC # BLD: 3.06 MILL/MM3 (ref 4.2–5.4)
RESPIRATORY CULTURE: ABNORMAL
SEG NEUTROPHILS: 87 %
SEGMENTED NEUTROPHILS ABSOLUTE COUNT: 24.8 THOU/MM3 (ref 1.8–7.7)
SODIUM BLD-SCNC: 142 MEQ/L (ref 135–145)
WBC # BLD: 28.5 THOU/MM3 (ref 4.8–10.8)

## 2022-01-25 PROCEDURE — 2000000000 HC ICU R&B

## 2022-01-25 PROCEDURE — 94761 N-INVAS EAR/PLS OXIMETRY MLT: CPT

## 2022-01-25 PROCEDURE — 6370000000 HC RX 637 (ALT 250 FOR IP): Performed by: NURSE PRACTITIONER

## 2022-01-25 PROCEDURE — 6360000002 HC RX W HCPCS: Performed by: NURSE PRACTITIONER

## 2022-01-25 PROCEDURE — 2580000003 HC RX 258: Performed by: NURSE PRACTITIONER

## 2022-01-25 PROCEDURE — 99291 CRITICAL CARE FIRST HOUR: CPT | Performed by: INTERNAL MEDICINE

## 2022-01-25 PROCEDURE — 2580000003 HC RX 258: Performed by: STUDENT IN AN ORGANIZED HEALTH CARE EDUCATION/TRAINING PROGRAM

## 2022-01-25 PROCEDURE — 94640 AIRWAY INHALATION TREATMENT: CPT

## 2022-01-25 PROCEDURE — 6360000002 HC RX W HCPCS: Performed by: STUDENT IN AN ORGANIZED HEALTH CARE EDUCATION/TRAINING PROGRAM

## 2022-01-25 PROCEDURE — 85025 COMPLETE CBC W/AUTO DIFF WBC: CPT

## 2022-01-25 PROCEDURE — 80048 BASIC METABOLIC PNL TOTAL CA: CPT

## 2022-01-25 PROCEDURE — 2500000003 HC RX 250 WO HCPCS: Performed by: STUDENT IN AN ORGANIZED HEALTH CARE EDUCATION/TRAINING PROGRAM

## 2022-01-25 PROCEDURE — 2700000000 HC OXYGEN THERAPY PER DAY

## 2022-01-25 PROCEDURE — 94003 VENT MGMT INPAT SUBQ DAY: CPT

## 2022-01-25 PROCEDURE — 2500000003 HC RX 250 WO HCPCS: Performed by: INTERNAL MEDICINE

## 2022-01-25 RX ORDER — LACTOBACILLUS RHAMNOSUS GG 10B CELL
1 CAPSULE ORAL
Status: DISCONTINUED | OUTPATIENT
Start: 2022-01-26 | End: 2022-02-04 | Stop reason: HOSPADM

## 2022-01-25 RX ADMIN — METOCLOPRAMIDE HYDROCHLORIDE 5 MG: 5 SOLUTION ORAL at 01:17

## 2022-01-25 RX ADMIN — BUDESONIDE AND FORMOTEROL FUMARATE DIHYDRATE 1 PUFF: 80; 4.5 AEROSOL RESPIRATORY (INHALATION) at 20:37

## 2022-01-25 RX ADMIN — ALLOPURINOL 100 MG: 100 TABLET ORAL at 08:57

## 2022-01-25 RX ADMIN — MICONAZOLE NITRATE: 2 POWDER TOPICAL at 20:58

## 2022-01-25 RX ADMIN — MEROPENEM 500 MG: 500 INJECTION, POWDER, FOR SOLUTION INTRAVENOUS at 08:58

## 2022-01-25 RX ADMIN — ASPIRIN 81 MG CHEWABLE TABLET 81 MG: 81 TABLET CHEWABLE at 08:56

## 2022-01-25 RX ADMIN — DAKIN'S SOLUTION 0.125% (QUARTER STRENGTH): 0.12 SOLUTION at 08:57

## 2022-01-25 RX ADMIN — SODIUM CHLORIDE, PRESERVATIVE FREE 10 ML: 5 INJECTION INTRAVENOUS at 08:57

## 2022-01-25 RX ADMIN — BUDESONIDE AND FORMOTEROL FUMARATE DIHYDRATE 1 PUFF: 80; 4.5 AEROSOL RESPIRATORY (INHALATION) at 06:04

## 2022-01-25 RX ADMIN — Medication 30 ML: at 08:57

## 2022-01-25 RX ADMIN — FAMOTIDINE 20 MG: 20 TABLET, FILM COATED ORAL at 08:58

## 2022-01-25 RX ADMIN — MIDODRINE HYDROCHLORIDE 15 MG: 5 TABLET ORAL at 06:07

## 2022-01-25 RX ADMIN — POTASSIUM CHLORIDE 20 MEQ: 400 INJECTION, SOLUTION INTRAVENOUS at 16:54

## 2022-01-25 RX ADMIN — MIDODRINE HYDROCHLORIDE 15 MG: 5 TABLET ORAL at 22:49

## 2022-01-25 RX ADMIN — MIDODRINE HYDROCHLORIDE 15 MG: 5 TABLET ORAL at 17:05

## 2022-01-25 RX ADMIN — BUMETANIDE 1 MG/HR: 0.25 INJECTION INTRAMUSCULAR; INTRAVENOUS at 11:51

## 2022-01-25 RX ADMIN — METOCLOPRAMIDE HYDROCHLORIDE 5 MG: 5 SOLUTION ORAL at 08:57

## 2022-01-25 RX ADMIN — MICONAZOLE NITRATE: 2 POWDER TOPICAL at 08:57

## 2022-01-25 RX ADMIN — CHLOROTHIAZIDE SODIUM 500 MG: 500 INJECTION, POWDER, LYOPHILIZED, FOR SOLUTION INTRAVENOUS at 08:59

## 2022-01-25 RX ADMIN — FLUOXETINE HYDROCHLORIDE 40 MG: 20 CAPSULE ORAL at 08:56

## 2022-01-25 RX ADMIN — CHLOROTHIAZIDE SODIUM 500 MG: 500 INJECTION, POWDER, LYOPHILIZED, FOR SOLUTION INTRAVENOUS at 22:49

## 2022-01-25 RX ADMIN — MEROPENEM 500 MG: 500 INJECTION, POWDER, FOR SOLUTION INTRAVENOUS at 20:30

## 2022-01-25 RX ADMIN — MICONAZOLE NITRATE: 2 POWDER TOPICAL at 01:17

## 2022-01-25 RX ADMIN — POTASSIUM CHLORIDE 20 MEQ: 400 INJECTION, SOLUTION INTRAVENOUS at 17:56

## 2022-01-25 RX ADMIN — POTASSIUM CHLORIDE 20 MEQ: 400 INJECTION, SOLUTION INTRAVENOUS at 15:48

## 2022-01-25 ASSESSMENT — PULMONARY FUNCTION TESTS
PIF_VALUE: 21
PIF_VALUE: 20
PIF_VALUE: 21

## 2022-01-25 NOTE — PROGRESS NOTES
[address] [facility] THERAPY MISSED TREATMENT NOTE  [facility] ICU 4D  4D-007-A      Date: 2022  Patient Name: Mariia Ramey        CSN: [id number]   : 1950  (73 y.o.)  Gender: female                REASON FOR MISSED TREATMENT: Hold treatment per nursing request.  RN reports that patient was more responsive this morning, but not quite ready to try early mobility. Recommended to try tomorrow. OT to attempt back another day.

## 2022-01-25 NOTE — PROGRESS NOTES
150ml  Pertinent Labs: glucose 126  BUN 83  Creatinine 2.2  K+ 3  MAP 75  Pertinent Meds: ATB, MVI, bumex, reglan (discontinuing today d/t loose stools)  Current Weight: 171 lb (77.6 kg) (1/25 with +1-2 edema)  Admission Weight:  178 lb (80.7 kg) (1/20 with +2 edema)  Wounds: Pressure Injury,Stage IV (coccyx with possible osteomyelitis; debrided 8/16/21; unstageable right and left heels; nephrostomy tube)    Please refer to initial nutrition assessment for additional details.    [address], [place], [place]:    Contact Number: [phone]

## 2022-01-25 NOTE — PROGRESS NOTES
CRITICAL CARE PROGRESS NOTE      Patient:  Garth Holland    Unit/Bed:4D-07/007-A  YOB: 1950  MRN: [id number]   Acct: [de-identified]   PCP: Ren Linton MD  Date of Admission: 1/20/2022  Chief Complaint:- Acute respiratory failure    Assessment and Plan:    Acute on chronic respiratory failure with hypoxia and hypercapnia:  Intubated on 1/24/2022 for airway protection as patient was obtunded, no cough reflex  Extubation precluded by ventilator settings (Pressure control 18) will continue weaning attempts. Pseudomonal pneumonia  Pseudomonas aeruginosa on molecular pneumonia panel, not seen gram stain, pending culture and sensitivity  Day 3 meropenum  Urinary tract infection  Klebsiella pneumoniae, proteus mirabilis (probable ESBL )  Day 3 meropenum   Retroperitoneal hematoma  Percutaneous nephrostomy changed on 1/12/2022  Stable hemoglobin  Urology Consulted  Sepsis  WBC down trending  Acute on chronic kidney injury, non-oligouric  Baseline creatinine 0.7-1.5, 2.0 this am  Previously on dialysis in Dec 2021. Acute decompensated pulmonary hypertension  Has been off Riociquat at [place] due to bleeding risk?, will continue to hold  Diueresis with bumetanide and chlorothiazide  infusions  Net negative over past two days. Left obstructive uropathy  Staghorn calculi in left kidney, percutaneous nephrostomy tube, recent change on 1/12/2022  Urology Consulted  Acute on chronic Anemia  Secondary to hematoma above  Blood transfusions as above  Erythropoitin, iron replacement  Stage IV decubitus ulcer  Wound Ostomy Consulted  Will discuss diverting colostomy with family and consult General Surgery  Dysphagia  Secondary to prolonged intubation, PEG tube in situ  Will continue PEG feeds as patient is now intubated, consider re-evaluation by Speech and Language Pathology when patient is extubated.   Malnutrition  Continue tube feed via PEG  Gout  Noted continue allopurinol  Obstructive Sleep Apnea  Non-adherent to CPAP  Chronic tobacco use  Encourage smoking cessation  ANCA Associated vasculitis  Consider renal biopsy once stable  Obesity  Body mass index is 37.09 kg/m². INITIAL H AND P AND ICU COURSE:  Cole alicea is a 63 female transferred to [facility]'s ICU on 1/1/2022 with acute respiratory failure, hypoxia. At time of transfer patient was on BiPAP. Past medical history significant for everyday smoker, obstructive sleep apnea, paroxysmal atrial fibrillation status post cardioversion in November 2021, pulmonary artery hypertension, HFpEF with echo on October 2021 showed Function 60-65% with trace tricuspid regurg. RV function normal, essential hypertension, depression, gout, osteoarthritis, renal calculi, thrombocytopenia, stage III sacral ulcer status post wound ostomy care. Patient was admitted to [facility] from October 27 to December 1, 2021 and was then transferred to [facility].  This hospitalization was for acute hypoxic and hypercapnic respiratory failure secondary to severe pneumonia, left-sided pleural effusion, repeated mucous plugging. Patient had tracheostomy on 11/17/2021 and was decannulated successfully on 12/23/2021. She also stage IV decubitus ulcer with suspected osteomyelitis, ALVIN, likely acute tubular necrosis, ANCA associated vasculitis, hydro (with a left-sided staghorn calculi. Patient had a percutaneous nephrostomy tube placed on 11/12/2021 she also has bilateral pleural effusions and had thoracentesis on 12/1/2021. Percutaneous nephrostomy tube changed on 1/12/2022    Over the past few days family noticed change in responsiveness and change in mental status. Patient have a complaint of back and rib pain. She is noted to have an elevated troponin, she then became hypoxic, hypercapnic and was transitioned to BiPAP therapy. On 1/20/2022 per Dr. Nannette Rodriguez, she was transferred to the ICU for further management and care.     Subjective:- (Last 24 hours)  Intubated yesterday morning, CT head negative, difficult to wean ventilator settings in past 24 hours. Past medical history, family history, social history and allergies reviewed again and is unchanged since admission.     ROS (patient intubated, sedated)      Scheduled Meds:   budesonide-formoterol  1 puff Inhalation BID    meropenem  500 mg IntraVENous Q12H    miconazole   Topical BID    multivitamin+  30 mL Oral Daily    midodrine  15 mg Per G Tube Q8H    allopurinol  100 mg Oral Daily    aspirin  81 mg Oral Daily    famotidine  20 mg PEG Tube Daily    FLUoxetine  40 mg Oral Daily    metoclopramide  5 mg PEG Tube Q8H    [Held by provider] Riociguat  2.5 mg Oral TID    sodium hypochlorite   Irrigation Daily    ferrous sulfate  325 mg Oral Q MWF    epoetin prince-epbx  4,000 Units SubCUTAneous Weekly    chlorothiazide (DIURIL) IVPB  500 mg IntraVENous Q12H    sodium chloride flush  5-40 mL IntraVENous 2 times per day    [Held by provider] enoxaparin  40 mg SubCUTAneous Daily     Continuous Infusions:   midazolam 1 mg/hr (01/25/22 0430)    fentaNYL 500 mcg in dextrose 5% 100 ml infusion      sodium chloride 20 mL/hr at 01/23/22 2223    norepinephrine Stopped (01/24/22 2222)    sodium chloride      bumetanide 0.1 mg/mL infusion 1 mg/hr (01/25/22 0430)     PRN Meds:.docusate, hydrOXYzine, senna, potassium chloride, magnesium sulfate, sodium chloride, sodium chloride flush, ondansetron **OR** ondansetron, polyethylene glycol, acetaminophen **OR** acetaminophen, sodium chloride flush     PHYSICAL EXAMINATION:  Patient Vitals for the past 8 hrs:   BP Temp Temp src Pulse Resp SpO2 Weight   01/25/22 0900 (!) 139/56 -- -- 81 21 99 % --   01/25/22 0830 (!) 136/59 -- -- 80 20 100 % --   01/25/22 0819 -- -- -- 79 22 99 % --   01/25/22 0730 (!) 144/57 98.1 °F (36.7 °C) Bladder 79 20 99 % --   01/25/22 0700 (!) 144/56 -- -- 78 19 99 % --   01/25/22 0630 (!) 136/58 -- -- 82 21 99 % --   01/25/22 0600 (!) 131/55 -- -- 80 20 100 % 171 lb 6.4 oz (77.7 kg)   01/25/22 0530 (!) 121/52 -- -- 80 20 98 % --   01/25/22 0500 (!) 125/59 -- -- 81 19 98 % --   01/25/22 0430 (!) 129/47 98.1 °F (36.7 °C) Bladder 81 26 97 % --   01/25/22 0416 136/60 -- -- 77 29 98 % --   01/25/22 0330 (!) 126/50 -- -- 79 23 98 % --   01/25/22 0300 (!) 128/58 -- -- 80 23 98 % --   01/25/22 0230 (!) 111/41 -- -- 74 23 98 % --   01/25/22 0200 (!) 122/48 -- -- 81 23 98 % --     I/O last 3 completed shifts: In: 3895 [I.V.:1398.9; NG/GT:1133; IV Piggyback:747.1]  Out: 6263 [Urine:4995; Stool:150]   Wt Readings from Last 3 Encounters:   01/25/22 171 lb 6.4 oz (77.7 kg)   12/01/21 168 lb 9 oz (76.5 kg)   10/25/21 164 lb (74.4 kg)      Body mass index is 37.09 kg/m². CAM-ICU:   RASS -1    General:   Adult female, intubated, opens eyes to name, does not follow commands  HEENT:  Normocephalic and atraumatic. No scleral icterus. PERRL  Neck: Supple. No Thyromegaly. Lungs: Clear to auscultation. No retractions  Cardiac: RRR. No JVD. Abdomen: Soft. Nontender. Extremities:  Peripheral edema    Vasculature: capillary refill < 3 seconds. Palpable dorsalis pedis pulses. Skin:  warm and dry. Sacral wound, . Psych:  Patient intubated, sedated. .  Lymph:  No supraclavicular adenopathy. Neurologic:  No focal deficit. No seizures.       CURRENT PARENTERAL VASOACTIVE / INOTROPIC AGENTS:  [] None Vasopressors:  [] Norepinephrine  [] Dopamine  [] Phenylephrine  [] Vasopressin  [] Epinephrine  [] Other: Sedation:  [] No Sedation  [] Propofol gtt-    [x] BZD gtt - midazolam 1 mg/hr  [] Opiate gtt  -    [] Dexmedetomidine  [] Paralytics Antihypertensives gtt  [] Ca Channel Antagonist:  [] Beta-blocker:  [] Nitroglycerin  [] Nitroprusside       SUPPORT DEVICES:  [x] ETT (1/24/2022)  [] Tracheostomy    MODE:-  [x]PC           []CPAP             []BILEVEL-PAP    Pressure Control 18, PEEP 6, FiO2 30% Tidal Volume 340, RR 19, Peak Pressure 23      NUTRITION:  [x] Gastric Tube [] OG / [] procedures. Time does include my direct assessment of the patient and coordination of care.   Electronically signed by Rolando Gipson MD on 1/25/2022 at 2:18 PM

## 2022-01-25 NOTE — TELEPHONE ENCOUNTER
Robotic Surgery Scheduling Form   [facility] [address]    Phone * [phone]   Surgical Scheduling Direct line Phone * [phone]  Fax * [phone]      Mora Tran      1950    female    [address] [facility]   Marital Status:    Single     Home Phone: [phone]   Cell Phone:   Telephone Information:   Mobile [phone]              Surgeon: Dr. Ernestine Erickson   Surgery Date:01-   Time: 12:30pm     Procedure: Robotic assisted laparoscopy with diverting loop colostomy  Inpatient    Diagnosis: Stage IV Coccyx Decubitus ulcer    Important Medical History: In [facility]    Special Inst/Equip: PATIENT ON ICU BED 7    CPT Codes: 78084    Latex Allergy:   no Cardiac Device:  no    Anesthesia Type: General    Case Location:  Main OR     Preadmission Testing: Phone Call      PAT Date and Time: ________________________________    PAT Confirmation #: _________________________________    Post Op Visit:  ______________________________________    Need Preop Cardiac Clearance:   no    Does patient have Cardiologist/physician? Unknown    Surgery Conformation #:  ______________________________________________    : __________________________________ Date:____________________      RNFA (colon resection only):       [employer] Name:  Hyacinth Mac

## 2022-01-25 NOTE — PROGRESS NOTES
Pharmacy Renal Adjustment    Mora Tran is a 73 y.o. female. Pharmacy renally adjust the following medications per P[employer] approved policy: meropenem    Height:   Ht Readings from Last 1 Encounters:   01/20/22 4' 9\" (1.448 m)     Weight:  Wt Readings from Last 1 Encounters:   01/24/22 172 lb 2.9 oz (78.1 kg)     Recent Labs     01/22/22  0508 01/23/22  0810   BUN 70* 76*   CREATININE 2.0* 2.1*     CrCl cannot be calculated (Unknown ideal weight.). Calculated CrCl: 13 mL/min    Assessment:  ALVIN    Plan:   Decrease meropenem from 1gm q8h to 500mg q12h        Vicenta Bernalstanley PAEZ, [facility], BCCCP 1/24/2022 7:35 PM

## 2022-01-25 NOTE — PLAN OF CARE
Face to face discussion with patient's sister and brother at bedside about patient's condition. Discussed diverting colostomy and consulted General Surgery for this. Arsalan Bruce both consent to us consulting general surgery.

## 2022-01-25 NOTE — PROGRESS NOTES
[facility] 62  PHYSICAL THERAPY MISSED TREATMENT NOTE  [facility] ICU 4D    Date: 2022  Patient Name: Glenn Simon        MRN: [id number]   : 1950  (77 y.o.)  Gender: female                REASON FOR MISSED TREATMENT:  Hold treatment per nursing request.  RN reports that pt was more responsive this morning, but not quite ready to try early mobility. Recommended to try tomorrow. Khai Mccarty.  Marlo Peres [place] [address]

## 2022-01-26 ENCOUNTER — APPOINTMENT (OUTPATIENT)
Dept: MRI IMAGING | Age: 72
DRG: 981 | End: 2022-01-26
Attending: INTERNAL MEDICINE
Payer: MEDICARE

## 2022-01-26 PROBLEM — L89.154 PRESSURE INJURY OF SACRAL REGION, STAGE 4 (HCC): Status: ACTIVE | Noted: 2021-07-29

## 2022-01-26 LAB
ANION GAP SERPL CALCULATED.3IONS-SCNC: 12 MEQ/L (ref 8–16)
ANION GAP SERPL CALCULATED.3IONS-SCNC: 14 MEQ/L (ref 8–16)
ANISOCYTOSIS: PRESENT
BASOPHILS # BLD: 0.6 %
BASOPHILS ABSOLUTE: 0.2 THOU/MM3 (ref 0–0.1)
BLOOD CULTURE, ROUTINE: NORMAL
BLOOD CULTURE, ROUTINE: NORMAL
BUN BLDV-MCNC: 84 MG/DL (ref 7–22)
BUN BLDV-MCNC: 91 MG/DL (ref 7–22)
CALCIUM IONIZED: 1.21 MMOL/L (ref 1.12–1.32)
CALCIUM SERPL-MCNC: 9.3 MG/DL (ref 8.5–10.5)
CALCIUM SERPL-MCNC: 9.3 MG/DL (ref 8.5–10.5)
CHLORIDE BLD-SCNC: 101 MEQ/L (ref 98–111)
CHLORIDE BLD-SCNC: 101 MEQ/L (ref 98–111)
CO2: 28 MEQ/L (ref 23–33)
CO2: 32 MEQ/L (ref 23–33)
CREAT SERPL-MCNC: 2 MG/DL (ref 0.4–1.2)
CREAT SERPL-MCNC: 2 MG/DL (ref 0.4–1.2)
EOSINOPHIL # BLD: 1.2 %
EOSINOPHILS ABSOLUTE: 0.4 THOU/MM3 (ref 0–0.4)
ERYTHROCYTE [DISTWIDTH] IN BLOOD BY AUTOMATED COUNT: 18.3 % (ref 11.5–14.5)
ERYTHROCYTE [DISTWIDTH] IN BLOOD BY AUTOMATED COUNT: 65.5 FL (ref 35–45)
GFR SERPL CREATININE-BSD FRML MDRD: 25 ML/MIN/1.73M2
GFR SERPL CREATININE-BSD FRML MDRD: 25 ML/MIN/1.73M2
GLUCOSE BLD-MCNC: 121 MG/DL (ref 70–108)
GLUCOSE BLD-MCNC: 121 MG/DL (ref 70–108)
HCT VFR BLD CALC: 31.1 % (ref 37–47)
HEMOGLOBIN: 9.6 GM/DL (ref 12–16)
IMMATURE GRANS (ABS): 0.43 THOU/MM3 (ref 0–0.07)
IMMATURE GRANULOCYTES: 1.4 %
LYMPHOCYTES # BLD: 3.7 %
LYMPHOCYTES ABSOLUTE: 1.1 THOU/MM3 (ref 1–4.8)
MAGNESIUM: 1.6 MG/DL (ref 1.6–2.4)
MCH RBC QN AUTO: 30.7 PG (ref 26–33)
MCHC RBC AUTO-ENTMCNC: 30.9 GM/DL (ref 32.2–35.5)
MCV RBC AUTO: 99.4 FL (ref 81–99)
MONOCYTES # BLD: 5.2 %
MONOCYTES ABSOLUTE: 1.6 THOU/MM3 (ref 0.4–1.3)
NUCLEATED RED BLOOD CELLS: 0 /100 WBC
PHOSPHORUS: 3.9 MG/DL (ref 2.4–4.7)
PLATELET # BLD: 454 THOU/MM3 (ref 130–400)
PMV BLD AUTO: 9 FL (ref 9.4–12.4)
POTASSIUM SERPL-SCNC: 3.3 MEQ/L (ref 3.5–5.2)
POTASSIUM SERPL-SCNC: 3.6 MEQ/L (ref 3.5–5.2)
RBC # BLD: 3.13 MILL/MM3 (ref 4.2–5.4)
SEG NEUTROPHILS: 87.9 %
SEGMENTED NEUTROPHILS ABSOLUTE COUNT: 26.5 THOU/MM3 (ref 1.8–7.7)
SODIUM BLD-SCNC: 143 MEQ/L (ref 135–145)
SODIUM BLD-SCNC: 145 MEQ/L (ref 135–145)
WBC # BLD: 30.1 THOU/MM3 (ref 4.8–10.8)

## 2022-01-26 PROCEDURE — 2580000003 HC RX 258: Performed by: NURSE PRACTITIONER

## 2022-01-26 PROCEDURE — 6360000002 HC RX W HCPCS: Performed by: STUDENT IN AN ORGANIZED HEALTH CARE EDUCATION/TRAINING PROGRAM

## 2022-01-26 PROCEDURE — 84100 ASSAY OF PHOSPHORUS: CPT

## 2022-01-26 PROCEDURE — 74181 MRI ABDOMEN W/O CONTRAST: CPT

## 2022-01-26 PROCEDURE — 82330 ASSAY OF CALCIUM: CPT

## 2022-01-26 PROCEDURE — 99233 SBSQ HOSP IP/OBS HIGH 50: CPT | Performed by: UROLOGY

## 2022-01-26 PROCEDURE — 6370000000 HC RX 637 (ALT 250 FOR IP): Performed by: STUDENT IN AN ORGANIZED HEALTH CARE EDUCATION/TRAINING PROGRAM

## 2022-01-26 PROCEDURE — 85025 COMPLETE CBC W/AUTO DIFF WBC: CPT

## 2022-01-26 PROCEDURE — 6370000000 HC RX 637 (ALT 250 FOR IP): Performed by: NURSE PRACTITIONER

## 2022-01-26 PROCEDURE — 2580000003 HC RX 258: Performed by: STUDENT IN AN ORGANIZED HEALTH CARE EDUCATION/TRAINING PROGRAM

## 2022-01-26 PROCEDURE — 80048 BASIC METABOLIC PNL TOTAL CA: CPT

## 2022-01-26 PROCEDURE — 36592 COLLECT BLOOD FROM PICC: CPT

## 2022-01-26 PROCEDURE — 2500000003 HC RX 250 WO HCPCS: Performed by: STUDENT IN AN ORGANIZED HEALTH CARE EDUCATION/TRAINING PROGRAM

## 2022-01-26 PROCEDURE — 6360000002 HC RX W HCPCS: Performed by: NURSE PRACTITIONER

## 2022-01-26 PROCEDURE — 83735 ASSAY OF MAGNESIUM: CPT

## 2022-01-26 PROCEDURE — 2000000000 HC ICU R&B

## 2022-01-26 PROCEDURE — 6360000002 HC RX W HCPCS: Performed by: PHARMACIST

## 2022-01-26 PROCEDURE — 94640 AIRWAY INHALATION TREATMENT: CPT

## 2022-01-26 PROCEDURE — 99291 CRITICAL CARE FIRST HOUR: CPT | Performed by: INTERNAL MEDICINE

## 2022-01-26 PROCEDURE — 2580000003 HC RX 258: Performed by: PHARMACIST

## 2022-01-26 PROCEDURE — 99223 1ST HOSP IP/OBS HIGH 75: CPT | Performed by: SURGERY

## 2022-01-26 PROCEDURE — 94003 VENT MGMT INPAT SUBQ DAY: CPT

## 2022-01-26 RX ADMIN — FAMOTIDINE 20 MG: 20 TABLET, FILM COATED ORAL at 08:47

## 2022-01-26 RX ADMIN — ASPIRIN 81 MG CHEWABLE TABLET 81 MG: 81 TABLET CHEWABLE at 08:47

## 2022-01-26 RX ADMIN — BUMETANIDE 1 MG/HR: 0.25 INJECTION INTRAMUSCULAR; INTRAVENOUS at 02:28

## 2022-01-26 RX ADMIN — DAKIN'S SOLUTION 0.125% (QUARTER STRENGTH): 0.12 SOLUTION at 11:17

## 2022-01-26 RX ADMIN — FERROUS SULFATE TAB 325 MG (65 MG ELEMENTAL FE) 325 MG: 325 (65 FE) TAB at 15:22

## 2022-01-26 RX ADMIN — CHLOROTHIAZIDE SODIUM 500 MG: 500 INJECTION, POWDER, LYOPHILIZED, FOR SOLUTION INTRAVENOUS at 12:35

## 2022-01-26 RX ADMIN — BUMETANIDE 1 MG/HR: 0.25 INJECTION INTRAMUSCULAR; INTRAVENOUS at 16:49

## 2022-01-26 RX ADMIN — MIDODRINE HYDROCHLORIDE 15 MG: 5 TABLET ORAL at 08:47

## 2022-01-26 RX ADMIN — SODIUM CHLORIDE, PRESERVATIVE FREE 10 ML: 5 INJECTION INTRAVENOUS at 20:07

## 2022-01-26 RX ADMIN — MEROPENEM 500 MG: 500 INJECTION, POWDER, FOR SOLUTION INTRAVENOUS at 20:10

## 2022-01-26 RX ADMIN — MICONAZOLE NITRATE: 2 POWDER TOPICAL at 22:15

## 2022-01-26 RX ADMIN — MICONAZOLE NITRATE: 2 POWDER TOPICAL at 11:17

## 2022-01-26 RX ADMIN — MIDODRINE HYDROCHLORIDE 15 MG: 5 TABLET ORAL at 22:15

## 2022-01-26 RX ADMIN — Medication 1 CAPSULE: at 08:47

## 2022-01-26 RX ADMIN — MAGNESIUM SULFATE HEPTAHYDRATE 2000 MG: 40 INJECTION, SOLUTION INTRAVENOUS at 23:03

## 2022-01-26 RX ADMIN — DOCUSATE SODIUM LIQUID 100 MG: 100 LIQUID ORAL at 08:47

## 2022-01-26 RX ADMIN — BUDESONIDE AND FORMOTEROL FUMARATE DIHYDRATE 1 PUFF: 80; 4.5 AEROSOL RESPIRATORY (INHALATION) at 07:55

## 2022-01-26 RX ADMIN — Medication 30 ML: at 08:47

## 2022-01-26 RX ADMIN — MEROPENEM 500 MG: 500 INJECTION, POWDER, FOR SOLUTION INTRAVENOUS at 08:55

## 2022-01-26 RX ADMIN — BUDESONIDE AND FORMOTEROL FUMARATE DIHYDRATE 1 PUFF: 80; 4.5 AEROSOL RESPIRATORY (INHALATION) at 22:32

## 2022-01-26 RX ADMIN — CHLOROTHIAZIDE SODIUM 500 MG: 500 INJECTION, POWDER, LYOPHILIZED, FOR SOLUTION INTRAVENOUS at 22:23

## 2022-01-26 RX ADMIN — FLUOXETINE HYDROCHLORIDE 40 MG: 20 CAPSULE ORAL at 08:47

## 2022-01-26 RX ADMIN — ALLOPURINOL 100 MG: 100 TABLET ORAL at 08:47

## 2022-01-26 RX ADMIN — Medication 3 MG/HR: at 05:33

## 2022-01-26 RX ADMIN — SODIUM CHLORIDE, PRESERVATIVE FREE 10 ML: 5 INJECTION INTRAVENOUS at 08:56

## 2022-01-26 RX ADMIN — POTASSIUM CHLORIDE 20 MEQ: 400 INJECTION, SOLUTION INTRAVENOUS at 23:05

## 2022-01-26 ASSESSMENT — PULMONARY FUNCTION TESTS
PIF_VALUE: 21

## 2022-01-26 ASSESSMENT — PAIN SCALES - GENERAL
PAINLEVEL_OUTOF10: 0
PAINLEVEL_OUTOF10: 0

## 2022-01-26 NOTE — CONSULTS
[address] [address]  General Surgery Consult Note  Dr. Micaela Booth MD  Pt Name: Cee Dueñas  MRN: [id number]  [place]: 1950  Date of evaluation: 1/26/2022  Primary Care Physician: Aleshia Trevino MD  Patient evaluated at the request of  Dr. Chavez Phillips  Reason for evaluation: diverting colostomy  IMPRESSIONS:     Active Hospital Problems    Diagnosis Date Noted    Retroperitoneal hematoma [K66.1] 01/21/2022    Acute on chronic respiratory failure with hypoxia ([address] [J96.21] 01/20/2022    Pressure injury of sacral region, stage 4 ([address] [L89.154] 07/29/2021    Pulmonary HTN ([address] [I27.20] 09/03/2019     PLANS:   1. Stop TF at [place]  2. Plan laparoscopy and diverting loop colostomy tomorrow at 12:30  3. Marked leukocytosis, has + sputum and left perinephric hematoma(cant r/o abscess)  SUBJECTIVE:   History of Chief Complaint:    Cee Dueñas is a 73 y.o. 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 She is having continuous soiling of her perineum where her sacral decubitus is a currently have a rectal evacuation bag in place we were consulted for evaluation and consideration of diverting colostomy. Wound and ostomy had seen the patient and recommended diverting colostomy to prevent continued contamination of the sacral decubitus.   Past Medical History  Past Medical History:   Diagnosis Date    CHF (congestive heart failure) ([facility])     Dr. Sheron Lindsay Depression     Gout attack     Hypertension     Osteoarthritis     Pulmonary artery hypertension ([address]     [facility]-- Dr. Merritt Howard-- Dr. Sheron Lindsay Renal calculi     Dr. Arturo Zurita Thrombocytopenia [facility])      Past Surgical History  Past Surgical History:   Procedure Laterality Date   [facility] BRONCHOSCOPY N/A 11/22/2021    BRONCHOSCOPY performed by Heber Cain MD at [facility] N/A 11/30/2021    BRONCHOSCOPY WITHOUT FLURO performed by Heber Cain MD at [facility]    GASTROSTOMY TUBE PLACEMENT N/A 11/24/2021    EGD PEG TUBE PLACEMENT performed by Lesli Harmon MD at [facility]    IR [address]  11/26/2021    IR CHEST TUBE INSERTION 11/26/2021 [facility]    IR NEPHROSTOMY PERCUTANEOUS LEFT  11/1/2021    IR NEPHROSTOMY PERCUTANEOUS LEFT 11/1/2021 MD JORDON Palmer SPECIAL PROCEDURES    PRESSURE ULCER DEBRIDEMENT Right 8/6/2021    EXCISION RIGHT BUTTOCK WOUND AND CLOSURE performed by Guillaume Valdez MD at [facility]     Medications  Prior to Admission medications    Medication Sig Start Date End Date Taking?  Authorizing Provider   allopurinol (ZYLOPRIM) 100 MG tablet Take 100 mg by mouth daily   Yes Historical Provider, MD   famotidine (PEPCID) 20 MG tablet 20 mg by PEG Tube route daily   Yes Historical Provider, MD   hydrOXYzine (ATARAX) 25 MG tablet 25 mg by PEG Tube route every 8 hours as needed for Anxiety   Yes Historical Provider, MD   metoclopramide (REGLAN) 5 MG/5ML solution 5 mg by PEG Tube route every 8 hours   Yes Historical Provider, MD   midodrine (PROAMATINE) 5 MG tablet 10 mg by PEG Tube route 3 times daily   Yes Historical Provider, MD   piperacillin-tazobactam (ZOSYN) 2-0.25 GM per 50ML IVPB Infuse 2,250 mg intravenously every 8 hours   Yes Historical Provider, MD   senna (SENOKOT) 8.8 MG/5ML SYRP syrup Take 5 mLs by mouth nightly as needed   Yes Historical Provider, MD   acetaminophen (TYLENOL) 650 MG extended release tablet Take 650 mg by mouth every 8 hours as needed for Pain   Yes Historical Provider, MD   metoprolol tartrate (LOPRESSOR) 25 MG tablet Take 0.5 tablets by mouth 2 times daily 10/25/21   Tiffanie Duckworth, APRN - CNP   sodium hypochlorite (DAKINS) 0.125 % SOLN external solution Apply Dakin's moistened gauze dressings to wound twice daily and as needed.  8/24/21   Theoplis [place] L[address] Roberts CNP   sodium chloride 1 g tablet Take 1 tablet by mouth 2 times daily 8/3/21   Rosendo Santo MD   FLUoxetine (PROZAC) 40 MG capsule Take 1 capsule by mouth daily 7/16/21   Aleshia Trevino MD   potassium chloride (KLOR-CON M) 10 MEQ extended release tablet Take 1 tablet by mouth every other day 5/16/21   LANE Alarcon CNP   bumetanide (BUMEX) 1 MG tablet Take 1 tablet by mouth daily 5/16/21   LANE Alarcon CNP   Multiple Vitamins-Minerals (MULTIVITAMIN WOMEN PO) Take 1 tablet by mouth daily    Historical Provider, MD   Riociguat (ADEMPAS) 2.5 MG TABS Take 2.5 mg by mouth 3 times daily    Historical Provider, MD   docusate sodium (COLACE) 100 MG capsule Take 100 mg by mouth as needed     Historical Provider, MD   aspirin 81 MG tablet Take 81 mg by mouth daily     Historical Provider, MD    Scheduled Meds:   lactobacillus  1 capsule Oral Daily with breakfast    budesonide-formoterol  1 puff Inhalation BID    meropenem  500 mg IntraVENous Q12H    miconazole   Topical BID    multivitamin+  30 mL Oral Daily    midodrine  15 mg Per G Tube Q8H    allopurinol  100 mg Oral Daily    aspirin  81 mg Oral Daily    famotidine  20 mg PEG Tube Daily    FLUoxetine  40 mg Oral Daily    [Held by provider] Riociguat  2.5 mg Oral TID    sodium hypochlorite   Irrigation Daily    ferrous sulfate  325 mg Oral Q MWF    epoetin prince-epbx  4,000 Units SubCUTAneous Weekly    chlorothiazide (DIURIL) IVPB  500 mg IntraVENous Q12H    sodium chloride flush  5-40 mL IntraVENous 2 times per day    [Held by provider] enoxaparin  40 mg SubCUTAneous Daily     Continuous Infusions:   midazolam 3 mg/hr (01/26/22 3933)    fentaNYL 500 mcg in dextrose 5% 100 ml infusion      sodium chloride 20 mL/hr at 01/23/22 2223    norepinephrine Stopped (01/24/22 2222)    sodium chloride      bumetanide 0.1 mg/mL infusion 1 mg/hr (01/26/22 0228)     PRN Meds:.docusate, hydrOXYzine, senna, potassium chloride, magnesium sulfate, sodium chloride, sodium chloride flush, ondansetron **OR** ondansetron, polyethylene glycol, acetaminophen **OR** acetaminophen, sodium chloride flush  Allergies  Patient has no known allergies. Family History  Family History   Problem Relation Age of Onset   Emeli Candelaria Diabetes Father     Arthritis Mother    Emeli Candelaria COPD Mother     Diabetes Sister     Heart Disease Maternal Uncle     Breast Cancer Niece 36    Sleep Apnea Brother     Asthma Neg Hx     Birth Defects Neg Hx     Cancer Neg Hx     Depression Neg Hx     Early Death Neg Hx     Hearing Loss Neg Hx     High Blood Pressure Neg Hx     High Cholesterol Neg Hx     Kidney Disease Neg Hx     Learning Disabilities Neg Hx     Mental Illness Neg Hx     Mental Retardation Neg Hx     Miscarriages / Stillbirths Neg Hx     Stroke Neg Hx     Substance Abuse Neg Hx     Vision Loss Neg Hx     Other Neg Hx      Social History  Social History     Socioeconomic History    Marital status: Single     Spouse name: Not on file    Number of children: 0    Years of education: Not on file    Highest education level: Not on file   Occupational History    Not on file   Tobacco Use    Smoking status: Never Smoker    Smokeless tobacco: Never Used   Vaping Use    Vaping Use: Never used   Substance and Sexual Activity    Alcohol use: No    Drug use: No    Sexual activity: Not Currently   Other Topics Concern    Not on file   Social History Narrative    Not on file     Social Determinants of Health     Financial Resource Strain: Low Risk     Difficulty of Paying Living Expenses: Not very hard   Food Insecurity: No Food Insecurity    Worried About Running Out of Food in the Last Year: Never true    Traci of Food in the Last Year: Never true   Transportation Needs:     Lack of Transportation (Medical):  Not on file    Lack of Transportation (Non-Medical): Not on file   Physical Activity:     Days of Exercise per Week: Not on file    Minutes of Exercise per Session: Not on file   Stress:     Feeling of Stress : Not on file   Social Connections:     Frequency of Communication with Friends and Family: Not on file    Frequency of Social Gatherings with Friends and Family: Not on file    Attends Anabaptist Services: Not on file    Active Member of [address] [place] [address] or Organizations: Not on file    Attends Club or Organization Meetings: Not on file    Marital Status: Not on file   Intimate Partner Violence:     Fear of Current or Ex-Partner: Not on file    Emotionally Abused: Not on file    Physically Abused: Not on file    Sexually Abused: Not on file   Housing Stability:     Unable to Pay for Housing in the Last Year: Not on file    Number of [place] in the Last Year: Not on file    Unstable Housing in the Last Year: Not on file     Review of Systems:  Unobtainable, patient sedated on vent, no family available  OBJECTIVE:   CURRENT VITALS:  height is 4' 9\" (1.448 m) and weight is 171 lb 6.4 oz (77.7 kg). Her bladder temperature is 99.3 °F (37.4 °C). Her blood pressure is 136/63 and her pulse is 100. Her respiration is 21 and oxygen saturation is 97%. Body mass index is 37.09 kg/m². Temperature Range (24h):Temp: 99.3 °F (37.4 °C) Temp  Av.1 °F (37.3 °C)  Min: 98.1 °F (36.7 °C)  Max: 99.5 °F (37.5 °C)  BP Range (80P): Systolic (76TLI), IBC:660 , Min:112 , ECD:597     Diastolic (58PRY), BVO:24, Min:42, Max:63    Pulse Range (24h): Pulse  Av  Min: 73  Max: 100  Respiration Range (24h): Resp  Av  Min: 14  Max: 26  Current Pulse Ox (24h):  SpO2: 97 %  Pulse Ox Range (24h):  SpO2  Av.1 %  Min: 97 %  Max: 100 %  Oxygen Amount and Delivery: O2 Flow Rate (L/min): 2 L/min  CONSTITUTIONAL sedated on vent  SKIN: anasarca  LYMPH: no inguinal nodes  HEENT: edema of face. ET tube in place.   CHEST/LUNGS: rhonchi without wheezes CARDIOVASCULAR: Heart sounds are normal.  Regular rate and rhythm without murmur, gallop or rub. Normal S1 and S2. . Carotid and femoral pulses 2+/4 and equal bilaterally. ABDOMEN: Normal shape. Anasarca, PEG tube in place right lower quadrant scar(s) present. Normal bowel sounds. No bruits. Soft, nondistended, no masses or organomegaly. no evidence of hernia. Percussion: Normal without hepatosplenomegally. Tenderness: absent. RECTAL: deferred, not clinically indicated  NEUROLOGIC: sedated on vent. EXTREMITIES: diffuse edema. LABS:     Recent Labs     01/23/22  0810 01/24/22  0305 01/25/22  0600 01/26/22  0511   WBC  --  37.3* 28.5* 30.1*   HGB  --  8.6* 9.1* 9.6*   HCT  --  29.5* 29.9* 31.1*   PLT  --  382 410* 454*     --  142 143   K 3.4*  --  3.0* 3.6     --  103 101   CO2 24  --  25 28   BUN 76*  --  83* 84*   CREATININE 2.1*  --  2.2* 2.0*   MG 1.9  --   --   --    PHOS 4.4  --   --   --    CALCIUM 9.6  --  9.5 9.3   AST 14  --   --   --    ALT 12  --   --   --    BILITOT 0.2*  --   --   --        Results for Paradise Darby (MRN [id number]) as of 1/26/2022 10:01   Ref. Range 1/24/2022 05:30   Gram Stain Result Unknown Quality of sputum specimen: Specimen acceptable. Moderate segmented neutrophils observed. Rare ep. ..    Chlamydia pneumoniae By PCR Latest Ref Range: Not Detected  Not Detected   Rhinovirus Enterovirus PCR Latest Ref Range: Not Detected  Not Detected   Influenza A by PCR Latest Ref Range: Not Detected  Not Detected   Influenza B by PCR Latest Ref Range: Not Detected  Not Detected   Legionella Pneumophilia By PCR Latest Ref Range: Not Detected  Not Detected   Haemophilus Influenzae by PCR Latest Ref Range: Not Detected  Not Detected   Enterobacter cloacae complex by PCR Latest Ref Range: Not Detected  Not Detected   Escherichia coli by PCR Latest Ref Range: Not Detected  Not Detected   Klebsiella oxytoca by PCR Latest Ref Range: Not Detected  Not Detected   Klebsiella pneumoniae group by PCR Latest Ref Range: Not Detected  Not Detected   Serratia marcescens by PCR Latest Ref Range: Not Detected  Not Detected   Pseudomonas aeruginosa by PCR Latest Ref Range: Not Detected  Detected (A)   Adenovirus by PCR Latest Ref Range: Not Detected  Not Detected   Respiratory Syncytial Virus by PCR Latest Ref Range: Not Detected  Not Detected   Mycoplasma pneumoniae by PCR Latest Ref Range: Not Detected  Not Detected   Acinetobacter calcoaceticus-baumannii by PCR Latest Ref Range: Not Detected  Not Detected   Klebsiella aerogenes by PCR Latest Ref Range: Not Detected  Not Detected   Metapneumovirus by PCR Latest Ref Range: Not Detected  Not Detected   Moraxella catarrhalis by PCR Latest Ref Range: Not Detected  Not Detected   Parainfluenza virus by PCR Latest Ref Range: Not Detected  Not Detected   Proteus species by PCR Latest Ref Range: Not Detected  Not Detected   Resistant gene ctx-m by PCR Latest Ref Range: Not Detected  Not Detected   Resistant gene imp by PCR Latest Ref Range: Not Detected  Not Detected   Resistant gene kpc by PCR Latest Ref Range: Not Detected  Not Detected   Resistant gene ndm by PCR Latest Ref Range: Not Detected  Not Detected   Resistant gene vim by PCR Latest Ref Range: Not Detected  Not Detected   Resistant gene oxa-48-like by pcr Latest Ref Range: Not Detected  N/A   Resistant gene meca/c & mrej by PCR Latest Ref Range: Not Detected  N/A   Staph aureus by PCR Latest Ref Range: Not Detected  Not Detected   Strep agalactiae by PCR Latest Ref Range: Not Detected  Not Detected   Strep pneumoniae by PCR Latest Ref Range: Not Detected  Not Detected   Strep pyogenes by PCR Latest Ref Range: Not Detected  Not Detected     RADIOLOGY:   I have personally reviewed the following films:  CT ABDOMEN PELVIS WO CONTRAST Additional Contrast? None    Result Date: 1/20/2022  Impression: Left perinephric and renal subcapsular hematomas Loculated left retroperitoneal fluid, question hematoma versus abscess. Anasarca pattern noted with body wall edema This document has been electronically signed by: Waqas Mckinney MD on 01/20/2022 11:29 PM All CTs at this facility use dose modulation techniques and iterative reconstructions, and/or weight-based dosing when appropriate to reduce radiation to a low as reasonably achievable. CT HEAD WO CONTRAST    Result Date: 1/24/2022  1. No acute intracranial process. 2. Subcortical/periventricular white matter hypoattenuation statistically representing changes of chronic microvascular ischemia. Global parenchymal volume loss which is commensurate with reported age. This document has been electronically signed by: Lynne Ngo DO on 01/24/2022 05:49 AM All CTs at this facility use dose modulation techniques and iterative reconstructions, and/or weight-based dosing when appropriate to reduce radiation to a low as reasonably achievable. CT CHEST WO CONTRAST    Result Date: 1/20/2022  Impression: Bilateral pleural effusions and atelectasis with cardiomegaly. Question pulmonary edema. This document has been electronically signed by: Waqas Mckinney MD on 01/20/2022 11:23 PM All CTs at this facility use dose modulation techniques and iterative reconstructions, and/or weight-based dosing when appropriate to reduce radiation to a low as reasonably achievable. IR FLUORO GUIDED CVA DEVICE PLMT/REPLACE/REMOVAL    Result Date: 1/20/2022  Status post successful PICC line insertion. **This report has been created using voice recognition software. It may contain minor errors which are inherent in voice recognition technology. ** Final report electronically signed by Dr Louie Jefferson on 1/20/2022 5:30 PM    US GALLBLADDER RUQ    Result Date: 1/21/2022  Impression: Cholelithiasis considerable ductal dilation This document has been electronically signed by: Waqas Mckinney MD on 01/21/2022 01:43 AM    XR CHEST PORTABLE    Result Date: 1/24/2022  ET tube 2.5 cm above the avelina. This document has been electronically signed by: Joellen Cordon MD on 01/24/2022 04:51 AM    XR CHEST PORTABLE    Result Date: 1/24/2022  Improving heart failure. This document has been electronically signed by: Joellen Cordon MD on 01/24/2022 12:38 AM    XR CHEST PORTABLE    Result Date: 1/23/2022  1. Stable cardiomegaly with pulmonary vascular congestion suspicious for congestive heart failure. 2. Small left-sided pleural effusion. 3. Bilateral atelectasis/infiltrate. **This report has been created using voice recognition software. It may contain minor errors which are inherent in voice recognition technology. ** Final report electronically signed by Dr. Elza Bueno on 1/23/2022 10:39 AM    XR CHEST PORTABLE    Result Date: 1/22/2022  Impression: Cardiomegaly with pulmonary vascular congestion and bilateral pleural effusions, similar to prior study. This document has been electronically signed by: Leticia Vasquez MD on 01/22/2022 04:39 AM    XR CHEST PORTABLE    Result Date: 1/20/2022  Impression: 1. Lines and tubes as above. 2. Changes of the pulmonary parenchyma concerning for mild bilateral pneumonia. This document has been electronically signed by: Hesham Mireles MD on 01/20/2022 09:40 PM    XR CHEST PORTABLE    Result Date: 1/19/2022  Cardiomegaly with vascular congestion and possible left effusion may indicate congestive failure. 2. Right lower lobe patchy infiltrates and left midlung subsegmental atelectasis. **This report has been created using voice recognition software. It may contain minor errors which are inherent in voice recognition technology. ** Final report electronically signed by Dr. Olga Packer on 1/19/2022 1:24 PM    VL DUP LOWER EXTREMITY VENOUS BILATERAL    Result Date: 1/21/2022  No evidence of a DVT. **This report has been created using voice recognition software. It may contain minor errors which are inherent in voice recognition technology. ** Final report electronically signed by Dr Ramon Vergara on 1/21/2022 1:43 PM           Thank you for the evaluation.  Further recommendations above        Electronically signed by Audie Peres MD on 1/26/2022 at 7:03 AM

## 2022-01-26 NOTE — CARE COORDINATION
1/26/22, 2:48 PM EST    DISCHARGE ON GOING EVALUATION    [facility] [employer] day: 6  Location: -07/007-A Reason for admit: Acute on chronic respiratory failure with hypoxia [facility]) [J96.21]   Procedure:   1/18 Left nephrostomy tube exchanged  1/20 PICC right   1/20 CT Chest: Bilateral pleural effusions and atelectasis with cardiomegaly; question pulmonary edema  1/20 CT Abd/pelvis: Left perinephric and renal subcapsular hematomas; Loculated left retroperitoneal fluid, question hematoma versus abscess; Anasarca pattern noted with body wall edema  1/20 US GB/RUQ: Cholelithiasis considerable ductal dilation  1/21 BLE Venous doppler: Neg for DVT  1/21 Echo with EF 60%  1/24 CT Head: No acute findings  1/24 Intubated    Barriers to Discharge: General Surgery consulted for stage 4 coccyx ulcer, may benefit from diverting colostomy. Plan for diverting colostomy tomorrow. Remains on vent w/ETT on PCV, peep 6, FIO2 30%, sats 97%. Tmax 100.2. NSR. Oriented to person and place. Follows commands. Flush Nephrostomy tube Q8H. Dietitian and Wound Care following. Respiratory culture +Proteus & candida albicans. SLP. Telemetry, PICC, PEG w/TF, nephrostomy tube w/drainage kings w/sediment, flexiseal, seaman, wound care, SCDs. Bumex @ 1 mg/hr, versed @ 3 mg/hr, allopurinol, asa, inhaler, IV diuril, sq retacrit, pepcid, ferrous sulfate, prozac, lactobacillus, IV merrem, desenex, midodrine, dakins daily, Electrolyte replacement protocols. BUN 84, Creat 2, wbc 30.1, hgb 9.6. PCP: Essence Westbrook MD  Readmission Risk Score: 23.7 ( )%  Patient Goals/Plan/Treatment Preferences: From [facility]; does not want to return. Plan for SNF; referrals out to [facility] - [facility]st choice, and [facility]. Will need PT/OT when appropriate.

## 2022-01-26 NOTE — PROGRESS NOTES
[address]  PHYSICAL THERAPY MISSED TREATMENT NOTE  [facility] ICU 4D    Date: 2022  Patient Name: Celina Paredes        MRN: [id number]   : 1950  (77 y.o.)  Gender: female                REASON FOR MISSED TREATMENT:  RN reports that pt is to have colostomy surgery tomorrow and continues to be sedated and intubated with minimal following of commands. Will discontinue and request new orders when pt becomes appropriate. Jayant Baxter.  Josafat Dhillon, Marlo [address] 8 oral

## 2022-01-26 NOTE — PROGRESS NOTES
Urology Progress Note    Chief Complaint: Acute respiratory failure  Reason for consultation: hematuria, abnormal CT results    Subjective: Intubated. sedated. Left nephrostomy tube draining tea colored urine in bag. Being flushed every 8 hrs. Padilla with light yellow clear urine. Low grade temp. WBC 30.1  On Merrum          Vitals:  BP (!) 107/48   Pulse 76   Temp 99.7 °F (37.6 °C) (Bladder)   Resp 17   Ht 4' 9\" (1.448 m)   Wt 171 lb 6.4 oz (77.7 kg)   SpO2 97%   BMI 37.09 kg/m²   Temp  Av.7 °F (37.6 °C)  Min: 99.3 °F (37.4 °C)  Max: 100.2 °F (37.9 °C)    Intake/Output Summary (Last 24 hours) at 2022 1147  Last data filed at 2022 4964  Gross per 24 hour   Intake 888.75 ml   Output 5460 ml   Net -4571.25 ml       Social History     Socioeconomic History    Marital status: Single     Spouse name: Not on file    Number of children: 0    Years of education: Not on file    Highest education level: Not on file   Occupational History    Not on file   Tobacco Use    Smoking status: Never Smoker    Smokeless tobacco: Never Used   Vaping Use    Vaping Use: Never used   Substance and Sexual Activity    Alcohol use: No    Drug use: No    Sexual activity: Not Currently   Other Topics Concern    Not on file   Social History Narrative    Not on file     Social Determinants of Health     Financial Resource Strain: Low Risk     Difficulty of Paying Living Expenses: Not very hard   Food Insecurity: No Food Insecurity    Worried About Running Out of Food in the Last Year: Never true    Traci of Food in the Last Year: Never true   Transportation Needs:     Lack of Transportation (Medical): Not on file    Lack of Transportation (Non-Medical):  Not on file   Physical Activity:     Days of Exercise per Week: Not on file    Minutes of Exercise per Session: Not on file   Stress:     Feeling of Stress : Not on file   Social Connections:     Frequency of Communication with Friends and Family: Not on file    Frequency of Social Gatherings with Friends and Family: Not on file    Attends Mosque Services: Not on file    Active Member of Clubs or Organizations: Not on file    Attends Club or Organization Meetings: Not on file    Marital Status: Not on file   Intimate Partner Violence:     Fear of Current or Ex-Partner: Not on file    Emotionally Abused: Not on file    Physically Abused: Not on file    Sexually Abused: Not on file   Housing Stability:     Unable to Pay for Housing in the Last Year: Not on file    Number of Places Lived in the Last Year: Not on file    Unstable Housing in the Last Year: Not on file     Family History   Problem Relation Age of Onset    Diabetes Father     Arthritis Mother     COPD Mother     Diabetes Sister     Heart Disease Maternal Uncle     Breast Cancer Niece 36    Sleep Apnea Brother     Asthma Neg Hx     Birth Defects Neg Hx     Cancer Neg Hx     Depression Neg Hx     Early Death Neg Hx     Hearing Loss Neg Hx     High Blood Pressure Neg Hx     High Cholesterol Neg Hx     Kidney Disease Neg Hx     Learning Disabilities Neg Hx     Mental Illness Neg Hx     Mental Retardation Neg Hx     Miscarriages / Stillbirths Neg Hx     Stroke Neg Hx     Substance Abuse Neg Hx     Vision Loss Neg Hx     Other Neg Hx      No Known Allergies      Constitutional: Intubated/sedated chronically ill appearing. HEENT:   Head:         Normocephalic and atraumatic. Mucous membranes are normal.   Eyes:         EOM are normal. No scleral icterus. Nose:    The external appearance of the nose is normal  Ears: The ears appear normal to external inspection. Cardiovascular:       Normal rate, regular rhythm. Pulmonary/Chest:  Lungs diminished. Scattered rhonchi  Abdominal:          No tenderness. PEG tube.   L neph tube draining concentrated yellow urine, 110 UOP last 24  Genitalia:    Padilla catheter draining clear light yellow urine 4900 UOP last 24  Musculoskeletal:    Normal range of motion. Extremities:    Bilateral lower extremity edema  Neurological:    Intubated/sedated     Labs:  WBC:    Lab Results   Component Value Date    WBC 30.1 01/26/2022     Hemoglobin/Hematocrit:    Lab Results   Component Value Date    HGB 9.6 01/26/2022    HCT 31.1 01/26/2022     BMP:    Lab Results   Component Value Date     01/26/2022    K 3.6 01/26/2022    K 4.0 12/01/2021     01/26/2022    CO2 28 01/26/2022    BUN 84 01/26/2022    LABALBU 2.8 01/23/2022    CREATININE 2.0 01/26/2022    CALCIUM 9.3 01/26/2022    LABGLOM 25 01/26/2022       Impression/Plan:  Low grade temp overnight, could consider re-imaging to evaluate hematoma vs abscess    L perinephric and renal subcapsular hematoma vs abscess--Fluid collection not large enough for drain placement on previous CT. If fever/worsening clinical condition/infection symptoms consider re-imaging and possibly needle aspiration. Monitor H/H.    L staghorn calculus--left neph tube exchanged 1/18/22--plan to treat stone outpatient once medically optimized  Klebsiella UTI  Hematuria--resolved. Padilla light yellow clear urine. L neph tube with concentrated yellow urine. Flush every 8-12 hrs  Anemia--Management per critical care. Hgb 9 1/22 after 1 u PRBC 9.6 today. CKD--baseline creatinine 1.7-2.3 since december  Sepsis  Acute on chronic respiratory failue  Stage IV large decubitus ulcer    Gen/surg planning on diverting colostomy tomorrow    Neph tube draining concentrated yellow urine. Flushed every 8 hrs per nursing. Change nephrostomy dressing every 48 hours and PRN for soiling.      Will follow peripherally     LANE Rollins - CNP  01/26/22 11:47 AM  Urology

## 2022-01-26 NOTE — PROGRESS NOTES
[facility] THERAPY MISSED TREATMENT NOTE  [facility] ICU 4D      Date: 2022  Patient Name: Cee Dueñas        MRN: [id number]    : 1950  (73 y.o.)    REASON FOR MISSED TREATMENT:  ST attempted to see patient this AM for completion of dysphagia tx; however, per discussion with NOAM Bell, patient continues to require intubation + mechanical ventilation. NOAM Bell reporting plans for surgery on 2022 with plans to possibly extubate following procedure as able. ST to check back on 22 as patient is medically appropriate and available.      [facility]) [address]

## 2022-01-26 NOTE — PROGRESS NOTES
CRITICAL CARE PROGRESS NOTE      Patient:  [facility]    Unit/Bed:4D-07/007-A  YOB: 1950  MRN: [id number]   PCP: Verona Gresham MD  Date of Admission: 1/20/2022  Chief Complaint:-Acute respiratory failure    Assessment and Plan:      Acute on chronic hypoxic respiratory failure: Patient was intubated 1/24 for airway protection. Maintain peak pressure of 35. Plan spontaneous breathing trial after surgery planned for 1/27. Pneumonia: Meropenem day #2, pneumonia panel positive for Pseudomonas. Leukocytosis: WBC 30.1, afebrile. Continue meropenem. Patient requiring no vasopressors. Macrocytic anemia: Hemoglobin 9.6, no signs of bleeding. Patient on weekly Retacrit   Chronic kidney injury: Creatinine 2.0, stable. Patient making adequate urine. Monitor. Retroperitoneal hematoma: Hemoglobin is stable. Urology was consulted general surgery was consulted. No plans for surgical intervention. Of note percutaneous nephrostomy tube was changed on 1/12/2022. Dilated common bile duct: Ultrasound gallbladder reported common bile duct of 14 mm. MRCP without contrast.   Urinary tract infection present on arrival: Patient continues on meropenem day #2. Previous cultures were ESBL producing. Pulmonary hypertension: Patient continues on diuresis. Left obstructive uropathy: Nephrostomy tube in place. This was changed 1/12/2022. Urology was consulted with no plans for intervention at this time. Stage IV decubitus ulcer: General surgery consulted for diverting colostomy. Gout: Allopurinol  Sleep apnea: Noted. Patient continues on ventilator  ANCA associated vasculitis: Will need biopsy when more medically stable.   Obesity: Noted, BMI 37.09    INITIAL H AND P AND ICU COURSE:  Marixa Wolf is a 62-year-old white female who presented to [facility] on 1/20/2022 as a transfer from [facility].  Per report patient has a past medical history of lifetime non-smoker, obstructive sleep apnea, paroxysmal atrial fibrillation, pulmonary artery hypertension, heart failure with preserved ejection fraction, hypertension, gout, depression, osteoarthritis, renal calculi, thrombocytopenia, stage IV decubitus ulcer. Per report patient initially presented to [facility] from October 27 through December 1, 2021 when she was transferred to [facility].  During the hospitalization she suffered from severe pneumonia with pleural effusion. She had a tracheostomy tube placed on 11/17/2020 2021 and was decannulated on 12/23/2021. She also has a stage IV decubitus ulcer with concern for osteomyelitis, ALVIN, ANCA associated vasculitis, hydronephrosis with left-sided staghorn calculi with percutaneous nephrostomy tube. Percutaneous nephrostomy tube was changed on 1/12/2022. Over the last few days in [place] family had noticed her responsiveness and mental status had been declining. She had been complaining of back pain. She became hypoxic and hypercapnic and was transitioned to BiPAP. On 1/20/2022 she was transferred to [facility] to the ICU for further management. Patient did require intubation on on 1/24/2022 for airway protection. Past Medical History: per HPI  Family History:  Mother: Arthritis, COPD Father: Diabetes  Social History: Lifetime non-smoker, denies alcohol drug use    ROS   Sedated on mechanical ventilation    Scheduled Meds:   lactobacillus  1 capsule Oral Daily with breakfast    budesonide-formoterol  1 puff Inhalation BID    meropenem  500 mg IntraVENous Q12H    miconazole   Topical BID    multivitamin+  30 mL Oral Daily    midodrine  15 mg Per G Tube Q8H    allopurinol  100 mg Oral Daily    aspirin  81 mg Oral Daily    famotidine  20 mg PEG Tube Daily    FLUoxetine  40 mg Oral Daily    [Held by provider] Riociguat  2.5 mg Oral TID    sodium hypochlorite   Irrigation Daily    ferrous sulfate  325 mg Oral Q MWF    epoetin prince-epbx  4,000 Units SubCUTAneous Weekly    chlorothiazide (DIURIL) IVPB  500 mg IntraVENous Q12H    sodium chloride flush  5-40 mL IntraVENous 2 times per day    [Held by provider] enoxaparin  40 mg SubCUTAneous Daily     Continuous Infusions:   midazolam 3 mg/hr (01/26/22 0533)    fentaNYL 500 mcg in dextrose 5% 100 ml infusion      sodium chloride 20 mL/hr at 01/23/22 2223    norepinephrine Stopped (01/24/22 2222)    sodium chloride      bumetanide 0.1 mg/mL infusion 1 mg/hr (01/26/22 0228)       PHYSICAL EXAMINATION:  T:  99.7. P:  91. RR:  21. B/P:  132/57. FiO2:  30. O2 Sat:  97.  I/O:  888/5460  Body mass index is 37.09 kg/m². PC: 16/6: TV: 336: RRTotal: 16: Ti:1 sec  General: Acute on chronically ill appear white female  HEENT:  normocephalic and atraumatic. No scleral icterus. PERR  Neck: supple. No Thyromegaly. Lungs: clear to auscultation. No retractions  Cardiac: RRR. No JVD. Abdomen: soft. Nontender. Extremities:  No clubbing, cyanosis, or edema x 4. Vasculature: capillary refill < 3 seconds. Palpable dorsalis pedis pulses. Skin:  warm and dry. Psych: Sedated on mechanical ventilation  Lymph:  No supraclavicular adenopathy. Neurologic:  No focal deficit. No seizures. Data: (All radiographs, tracings, PFTs, and imaging are personally viewed and interpreted unless otherwise noted). Sodium 143, potassium 3.6, chloride 101, CO2 28, BUN 84, creatinine 2.0, anion gap 14.0, glucose 121. WBC 30.1, hemoglobin 9.6, hematocrit 31.1, platelet count 320  Telemetry shows NSR   Pneumonia panel 1/24/2022 positive for Pseudomonas  CT head 1/24/2022 reports no acute intercranial process. Chest x-ray 1/24/2022 reports improving heart failure. May have left pleural effusion.   Gallbladder ultrasound 1/21/2022 reports cholelithiasis entire gallbladder        Meets Continued ICU Level Care Criteria:    [x] Yes   [] No - Transfer Planned to listed location:  [] HOSPITALIST CONTACTED-      Case and plan discussed with  Vinny Aldana.        Electronically signed by Avi House. LANE Rosa - CNP  CRITICAL CARE SPECIALIST  Patient seen by me. Case discussed with NP. On SBT. Continue with meropenem. Italicized font represents changes to the note made by me. CC time 35 minutes. Time was discontiguous. Time does not include procedure. Time does include my direct assessment of the patient and coordination of care. Time represents more than 50% of the time involved with patient care by the [address] [place] [address] team.  Electronically signed by Makayla Solano.  Vinny Aldana MD.

## 2022-01-26 NOTE — PROGRESS NOTES
Physician Progress Note      Connie Romero  CSN #:                  [id number]  :                       1950  ADMIT DATE:       2022 8:49 PM  DISCH DATE:  RESPONDING  PROVIDER #:        Kumar SHERMAN - CNP          QUERY TEXT:    Attending,    Pt admitted with sepsis and noted to have UTI. Pt presented with an   indwelling urinary catheter that was originally placed on 21 and replaced   on admission. Pt also noted to have a percutaneous nephrostomy tube that was   replaced on 22. If possible, please respond below and document in the   progress notes and discharge summary if you are evaluating and/or treating any   of the following: The medical record reflects the following:  Risk Factors: chronic indwelling urinary catheter, nephrostomy tube, left   staghorn calculus, left kidney subcapsular hematoma, advanced age  Clinical Indicators: UTI in the setting of indwelling urinary catheter,   nephrostomy tube, left staghorn calculus, left kidney subcapsular hematoma  Treatment: IV ATBs, IVF, Tylenol, Urology consult, labs, imaging, urinary   catheter exchange    Thank you! Tali Verde, RN, BSN, RHIT, CCDS  RN Clinical   [phone]  Options provided:  -- UTI due to chronic indwelling urinary catheter, POA  -- UTI due to nephrostomy tube, POA  -- UTI POA due to, please specify. -- Other - I will add my own diagnosis  -- Disagree - Not applicable / Not valid  -- Disagree - Clinically unable to determine / Unknown  -- Refer to Clinical Documentation Reviewer    PROVIDER RESPONSE TEXT:    UTI is due to the chronic indwelling urinary catheter, POA.     Query created by: Britney Rm on 2022 1:03 PM      Electronically signed by:  Juan C Yoo CNP 2022 3:20 PM

## 2022-01-27 ENCOUNTER — ANESTHESIA (OUTPATIENT)
Dept: OPERATING ROOM | Age: 72
DRG: 981 | End: 2022-01-27
Payer: MEDICARE

## 2022-01-27 ENCOUNTER — ANESTHESIA EVENT (OUTPATIENT)
Dept: OPERATING ROOM | Age: 72
DRG: 981 | End: 2022-01-27
Payer: MEDICARE

## 2022-01-27 VITALS — DIASTOLIC BLOOD PRESSURE: 68 MMHG | SYSTOLIC BLOOD PRESSURE: 140 MMHG | TEMPERATURE: 97.5 F | OXYGEN SATURATION: 100 %

## 2022-01-27 LAB
ALBUMIN SERPL-MCNC: 2.6 G/DL (ref 3.5–5.1)
ALP BLD-CCNC: 106 U/L (ref 38–126)
ALT SERPL-CCNC: 11 U/L (ref 11–66)
ANION GAP SERPL CALCULATED.3IONS-SCNC: 16 MEQ/L (ref 8–16)
AST SERPL-CCNC: 19 U/L (ref 5–40)
BASOPHILS # BLD: 0.8 %
BASOPHILS ABSOLUTE: 0.2 THOU/MM3 (ref 0–0.1)
BILIRUB SERPL-MCNC: 0.2 MG/DL (ref 0.3–1.2)
BILIRUBIN DIRECT: < 0.2 MG/DL (ref 0–0.3)
BUN BLDV-MCNC: 93 MG/DL (ref 7–22)
CALCIUM SERPL-MCNC: 9.6 MG/DL (ref 8.5–10.5)
CHLORIDE BLD-SCNC: 100 MEQ/L (ref 98–111)
CO2: 30 MEQ/L (ref 23–33)
CREAT SERPL-MCNC: 1.9 MG/DL (ref 0.4–1.2)
EOSINOPHIL # BLD: 1.8 %
EOSINOPHILS ABSOLUTE: 0.4 THOU/MM3 (ref 0–0.4)
ERYTHROCYTE [DISTWIDTH] IN BLOOD BY AUTOMATED COUNT: 17.7 % (ref 11.5–14.5)
ERYTHROCYTE [DISTWIDTH] IN BLOOD BY AUTOMATED COUNT: 64.8 FL (ref 35–45)
GFR SERPL CREATININE-BSD FRML MDRD: 26 ML/MIN/1.73M2
GLUCOSE BLD-MCNC: 98 MG/DL (ref 70–108)
GRAM STAIN RESULT: ABNORMAL
HCT VFR BLD CALC: 32.1 % (ref 37–47)
HEMOGLOBIN: 9.5 GM/DL (ref 12–16)
IMMATURE GRANS (ABS): 0.36 THOU/MM3 (ref 0–0.07)
IMMATURE GRANULOCYTES: 1.5 %
LYMPHOCYTES # BLD: 5.6 %
LYMPHOCYTES ABSOLUTE: 1.4 THOU/MM3 (ref 1–4.8)
MCH RBC QN AUTO: 29.7 PG (ref 26–33)
MCHC RBC AUTO-ENTMCNC: 29.6 GM/DL (ref 32.2–35.5)
MCV RBC AUTO: 100.3 FL (ref 81–99)
MONOCYTES # BLD: 6.1 %
MONOCYTES ABSOLUTE: 1.5 THOU/MM3 (ref 0.4–1.3)
NUCLEATED RED BLOOD CELLS: 0 /100 WBC
ORGANISM: ABNORMAL
PLATELET # BLD: 455 THOU/MM3 (ref 130–400)
PMV BLD AUTO: 8.9 FL (ref 9.4–12.4)
POTASSIUM SERPL-SCNC: 3.9 MEQ/L (ref 3.5–5.2)
RBC # BLD: 3.2 MILL/MM3 (ref 4.2–5.4)
RESPIRATORY CULTURE: ABNORMAL
SEG NEUTROPHILS: 84.2 %
SEGMENTED NEUTROPHILS ABSOLUTE COUNT: 20.8 THOU/MM3 (ref 1.8–7.7)
SODIUM BLD-SCNC: 146 MEQ/L (ref 135–145)
TOTAL PROTEIN: 5.9 G/DL (ref 6.1–8)
WBC # BLD: 24.7 THOU/MM3 (ref 4.8–10.8)

## 2022-01-27 PROCEDURE — 2580000003 HC RX 258: Performed by: STUDENT IN AN ORGANIZED HEALTH CARE EDUCATION/TRAINING PROGRAM

## 2022-01-27 PROCEDURE — 2709999900 HC NON-CHARGEABLE SUPPLY: Performed by: SURGERY

## 2022-01-27 PROCEDURE — 2700000000 HC OXYGEN THERAPY PER DAY

## 2022-01-27 PROCEDURE — 3700000000 HC ANESTHESIA ATTENDED CARE: Performed by: SURGERY

## 2022-01-27 PROCEDURE — 6370000000 HC RX 637 (ALT 250 FOR IP): Performed by: STUDENT IN AN ORGANIZED HEALTH CARE EDUCATION/TRAINING PROGRAM

## 2022-01-27 PROCEDURE — 6360000002 HC RX W HCPCS: Performed by: PHARMACIST

## 2022-01-27 PROCEDURE — 94003 VENT MGMT INPAT SUBQ DAY: CPT

## 2022-01-27 PROCEDURE — 2500000003 HC RX 250 WO HCPCS: Performed by: NURSE ANESTHETIST, CERTIFIED REGISTERED

## 2022-01-27 PROCEDURE — 2000000000 HC ICU R&B

## 2022-01-27 PROCEDURE — 99291 CRITICAL CARE FIRST HOUR: CPT | Performed by: INTERNAL MEDICINE

## 2022-01-27 PROCEDURE — 6360000002 HC RX W HCPCS: Performed by: NURSE PRACTITIONER

## 2022-01-27 PROCEDURE — 6370000000 HC RX 637 (ALT 250 FOR IP): Performed by: NURSE PRACTITIONER

## 2022-01-27 PROCEDURE — 2580000003 HC RX 258: Performed by: PHARMACIST

## 2022-01-27 PROCEDURE — 2500000003 HC RX 250 WO HCPCS: Performed by: STUDENT IN AN ORGANIZED HEALTH CARE EDUCATION/TRAINING PROGRAM

## 2022-01-27 PROCEDURE — 85025 COMPLETE CBC W/AUTO DIFF WBC: CPT

## 2022-01-27 PROCEDURE — 94640 AIRWAY INHALATION TREATMENT: CPT

## 2022-01-27 PROCEDURE — 0D1L4Z4 BYPASS TRANSVERSE COLON TO CUTANEOUS, PERCUTANEOUS ENDOSCOPIC APPROACH: ICD-10-PCS | Performed by: SURGERY

## 2022-01-27 PROCEDURE — 36415 COLL VENOUS BLD VENIPUNCTURE: CPT

## 2022-01-27 PROCEDURE — 82248 BILIRUBIN DIRECT: CPT

## 2022-01-27 PROCEDURE — 3600000004 HC SURGERY LEVEL 4 BASE: Performed by: SURGERY

## 2022-01-27 PROCEDURE — 3600000014 HC SURGERY LEVEL 4 ADDTL 15MIN: Performed by: SURGERY

## 2022-01-27 PROCEDURE — 2580000003 HC RX 258: Performed by: NURSE ANESTHETIST, CERTIFIED REGISTERED

## 2022-01-27 PROCEDURE — 3700000001 HC ADD 15 MINUTES (ANESTHESIA): Performed by: SURGERY

## 2022-01-27 PROCEDURE — 6360000002 HC RX W HCPCS: Performed by: STUDENT IN AN ORGANIZED HEALTH CARE EDUCATION/TRAINING PROGRAM

## 2022-01-27 PROCEDURE — 44188 LAP COLOSTOMY: CPT | Performed by: SURGERY

## 2022-01-27 PROCEDURE — 2580000003 HC RX 258: Performed by: NURSE PRACTITIONER

## 2022-01-27 PROCEDURE — 80053 COMPREHEN METABOLIC PANEL: CPT

## 2022-01-27 RX ORDER — CHLORHEXIDINE GLUCONATE 0.12 MG/ML
15 RINSE ORAL 2 TIMES DAILY
Status: DISCONTINUED | OUTPATIENT
Start: 2022-01-27 | End: 2022-02-01 | Stop reason: ALTCHOICE

## 2022-01-27 RX ORDER — SODIUM CHLORIDE 9 MG/ML
INJECTION, SOLUTION INTRAVENOUS CONTINUOUS PRN
Status: DISCONTINUED | OUTPATIENT
Start: 2022-01-27 | End: 2022-01-27 | Stop reason: SDUPTHER

## 2022-01-27 RX ORDER — KETAMINE HCL IN NACL, ISO-OSM 100MG/10ML
SYRINGE (ML) INJECTION PRN
Status: DISCONTINUED | OUTPATIENT
Start: 2022-01-27 | End: 2022-01-27 | Stop reason: SDUPTHER

## 2022-01-27 RX ORDER — ROCURONIUM BROMIDE 10 MG/ML
INJECTION, SOLUTION INTRAVENOUS PRN
Status: DISCONTINUED | OUTPATIENT
Start: 2022-01-27 | End: 2022-01-27 | Stop reason: SDUPTHER

## 2022-01-27 RX ADMIN — Medication 1 CAPSULE: at 08:18

## 2022-01-27 RX ADMIN — POTASSIUM CHLORIDE 20 MEQ: 400 INJECTION, SOLUTION INTRAVENOUS at 00:06

## 2022-01-27 RX ADMIN — MIDODRINE HYDROCHLORIDE 15 MG: 5 TABLET ORAL at 21:27

## 2022-01-27 RX ADMIN — CHLOROTHIAZIDE SODIUM 500 MG: 500 INJECTION, POWDER, LYOPHILIZED, FOR SOLUTION INTRAVENOUS at 11:26

## 2022-01-27 RX ADMIN — Medication 2 MG/HR: at 19:46

## 2022-01-27 RX ADMIN — BUMETANIDE 1 MG/HR: 0.25 INJECTION INTRAMUSCULAR; INTRAVENOUS at 21:21

## 2022-01-27 RX ADMIN — ALLOPURINOL 100 MG: 100 TABLET ORAL at 08:18

## 2022-01-27 RX ADMIN — CHLORHEXIDINE GLUCONATE 0.12% ORAL RINSE 15 ML: 1.2 LIQUID ORAL at 21:23

## 2022-01-27 RX ADMIN — CHLORHEXIDINE GLUCONATE 0.12% ORAL RINSE 15 ML: 1.2 LIQUID ORAL at 12:30

## 2022-01-27 RX ADMIN — BUDESONIDE AND FORMOTEROL FUMARATE DIHYDRATE 1 PUFF: 80; 4.5 AEROSOL RESPIRATORY (INHALATION) at 20:12

## 2022-01-27 RX ADMIN — SODIUM CHLORIDE, PRESERVATIVE FREE 10 ML: 5 INJECTION INTRAVENOUS at 21:23

## 2022-01-27 RX ADMIN — MEROPENEM 500 MG: 500 INJECTION, POWDER, FOR SOLUTION INTRAVENOUS at 07:04

## 2022-01-27 RX ADMIN — MEROPENEM 500 MG: 500 INJECTION, POWDER, FOR SOLUTION INTRAVENOUS at 12:33

## 2022-01-27 RX ADMIN — DAKIN'S SOLUTION 0.125% (QUARTER STRENGTH): 0.12 SOLUTION at 08:25

## 2022-01-27 RX ADMIN — SODIUM CHLORIDE: 9 INJECTION, SOLUTION INTRAVENOUS at 13:07

## 2022-01-27 RX ADMIN — SODIUM CHLORIDE, PRESERVATIVE FREE 10 ML: 5 INJECTION INTRAVENOUS at 08:25

## 2022-01-27 RX ADMIN — ASPIRIN 81 MG CHEWABLE TABLET 81 MG: 81 TABLET CHEWABLE at 08:18

## 2022-01-27 RX ADMIN — BUMETANIDE 1 MG/HR: 0.25 INJECTION INTRAMUSCULAR; INTRAVENOUS at 07:56

## 2022-01-27 RX ADMIN — Medication 30 ML: at 08:18

## 2022-01-27 RX ADMIN — Medication 25 MG: at 13:15

## 2022-01-27 RX ADMIN — MIDODRINE HYDROCHLORIDE 15 MG: 5 TABLET ORAL at 06:16

## 2022-01-27 RX ADMIN — MICONAZOLE NITRATE: 2 POWDER TOPICAL at 08:25

## 2022-01-27 RX ADMIN — ROCURONIUM BROMIDE 25 MG: 10 INJECTION INTRAVENOUS at 13:16

## 2022-01-27 RX ADMIN — FAMOTIDINE 20 MG: 20 TABLET, FILM COATED ORAL at 08:18

## 2022-01-27 RX ADMIN — FLUOXETINE HYDROCHLORIDE 40 MG: 20 CAPSULE ORAL at 08:18

## 2022-01-27 RX ADMIN — BUDESONIDE AND FORMOTEROL FUMARATE DIHYDRATE 1 PUFF: 80; 4.5 AEROSOL RESPIRATORY (INHALATION) at 07:40

## 2022-01-27 ASSESSMENT — PULMONARY FUNCTION TESTS
PIF_VALUE: 18
PIF_VALUE: 17
PIF_VALUE: 18
PIF_VALUE: 19
PIF_VALUE: 32
PIF_VALUE: 31
PIF_VALUE: 34
PIF_VALUE: 19
PIF_VALUE: 35
PIF_VALUE: 35
PIF_VALUE: 31
PIF_VALUE: 30
PIF_VALUE: 36
PIF_VALUE: 35
PIF_VALUE: 31
PIF_VALUE: 8
PIF_VALUE: 19
PIF_VALUE: 32
PIF_VALUE: 2
PIF_VALUE: 30
PIF_VALUE: 2
PIF_VALUE: 33
PIF_VALUE: 31
PIF_VALUE: 32
PIF_VALUE: 21
PIF_VALUE: 1
PIF_VALUE: 2
PIF_VALUE: 31
PIF_VALUE: 32
PIF_VALUE: 28
PIF_VALUE: 30
PIF_VALUE: 21
PIF_VALUE: 31
PIF_VALUE: 35
PIF_VALUE: 21
PIF_VALUE: 19
PIF_VALUE: 37
PIF_VALUE: 32
PIF_VALUE: 30
PIF_VALUE: 19
PIF_VALUE: 28
PIF_VALUE: 30
PIF_VALUE: 36
PIF_VALUE: 30
PIF_VALUE: 32
PIF_VALUE: 31
PIF_VALUE: 18
PIF_VALUE: 26
PIF_VALUE: 18
PIF_VALUE: 30
PIF_VALUE: 35
PIF_VALUE: 30
PIF_VALUE: 31
PIF_VALUE: 1
PIF_VALUE: 31
PIF_VALUE: 20
PIF_VALUE: 36
PIF_VALUE: 31
PIF_VALUE: 30
PIF_VALUE: 19
PIF_VALUE: 32
PIF_VALUE: 19
PIF_VALUE: 31
PIF_VALUE: 31
PIF_VALUE: 19
PIF_VALUE: 31
PIF_VALUE: 32
PIF_VALUE: 35
PIF_VALUE: 31
PIF_VALUE: 19

## 2022-01-27 ASSESSMENT — PAIN SCALES - GENERAL
PAINLEVEL_OUTOF10: 0

## 2022-01-27 NOTE — ANESTHESIA PRE PROCEDURE
Department of Anesthesiology  Preprocedure Note       Name:  Mora Tran   Age:  73 y.o.  :  1950                                          MRN:  [id number]         Date:  2022      Surgeon: Shiloh Ferrer):  Pablito Yoder MD    Procedure: Procedure(s):  ROBOTIC ASSISTED LAPAROSCOPY WITH DIVERTING LOOP COLOSTOMY    Medications prior to admission:   Prior to Admission medications    Medication Sig Start Date End Date Taking? Authorizing Provider   allopurinol (ZYLOPRIM) 100 MG tablet Take 100 mg by mouth daily    Historical Provider, MD   famotidine (PEPCID) 20 MG tablet 20 mg by PEG Tube route daily    Historical Provider, MD   hydrOXYzine (ATARAX) 25 MG tablet 25 mg by PEG Tube route every 8 hours as needed for Anxiety    Historical Provider, MD   metoclopramide (REGLAN) 5 MG/5ML solution 5 mg by PEG Tube route every 8 hours    Historical Provider, MD   midodrine (PROAMATINE) 5 MG tablet 10 mg by PEG Tube route 3 times daily    Historical Provider, MD   piperacillin-tazobactam (ZOSYN) 2-0.25 GM per 50ML IVPB Infuse 2,250 mg intravenously every 8 hours    Historical Provider, MD   senna (SENOKOT) 8.8 MG/5ML SYRP syrup Take 5 mLs by mouth nightly as needed    Historical Provider, MD   metoprolol tartrate (LOPRESSOR) 25 MG tablet Take 0.5 tablets by mouth 2 times daily 10/25/21   LANE Diez - CNP   sodium hypochlorite (DAKINS) 0.125 % SOLN external solution Apply Dakin's moistened gauze dressings to wound twice daily and as needed.  21   LANE Cruz CNP   sodium chloride 1 g tablet Take 1 tablet by mouth 2 times daily 8/3/21   Annelise Villar MD   FLUoxetine (PROZAC) 40 MG capsule Take 1 capsule by mouth daily 21   Celsa Riddle MD   potassium chloride (KLOR-CON M) 10 MEQ extended release tablet Take 1 tablet by mouth every other day 21   LANE Alarcon CNP   bumetanide (BUMEX) 1 MG tablet Take 1 tablet by mouth daily 21   L[address]    allopurinol (ZYLOPRIM) tablet 100 mg  100 mg Oral Daily Michelle Combs APRN - CNP   100 mg at 01/27/22 0818    aspirin chewable tablet 81 mg  81 mg Oral Daily [facility], APRN - CNP   81 mg at 01/27/22 0818    famotidine (PEPCID) tablet 20 mg  20 mg PEG Tube Daily [facility] Mary Combs, APRN - CNP   20 mg at 01/27/22 0818    FLUoxetine (PROZAC) capsule 40 mg  40 mg Oral Daily [facility], APRN - CNP   40 mg at 01/27/22 0818    hydrOXYzine (ATARAX) tablet 25 mg  25 mg PEG Tube Q8H PRN LANE Lorenzo CNP        [Held by provider] Riociguat TABS 2.5 mg  2.5 mg Oral TID LANE Lorenzo CNP        senna (SENOKOT) 8.8 MG/5ML syrup 8.8 mg  5 mL Per G Tube Nightly PRN LANE Lorenzo - CNP        sodium hypochlorite (DAKINS) 0.125 % external solution   Irrigation Daily LANE Lorenzo - CNP   Given at 01/27/22 0825    potassium chloride 20 mEq/50 mL IVPB (Central Line)  20 mEq IntraVENous PRN LANE Lorenzo - CNP 50 mL/hr at 01/27/22 0006 20 mEq at 01/27/22 0006    0.9 % sodium chloride infusion   IntraVENous Continuous MichelleLANE Bocanegra CNP 20 mL/hr at 01/23/22 2223 Rate Verify at 01/23/22 2223    magnesium sulfate 2000 mg in 50 mL IVPB premix  2,000 mg IntraVENous PRN Michelle Combs APRN - CNP   Stopped at 01/27/22 0105    ferrous sulfate (IRON 325) tablet 325 mg  325 mg Oral Q MWF Glory Layton MD   325 mg at 01/26/22 1522    epoetin prince-epbx (RETACRIT) injection 4,000 Units  4,000 Units SubCUTAneous Weekly Glory Layton MD   4,000 Units at 01/22/22 1054    0.9 % sodium chloride infusion   IntraVENous PRN Michelle Barbaraann Arabia, APRN - CNP        bumetanide (BUMEX) 12.5 mg in sodium chloride 0.9 % 125 mL infusion  1 mg/hr IntraVENous Continuous Summer Solorzano DO 10 mL/hr at 01/27/22 0824 1 mg/hr at 01/27/22 0824    chlorothiazide (DIURIL) 500 mg in sodium chloride 0.9 % 50 mL IVPB  500 mg IntraVENous Q12H [facility], APRN -  mL/hr at 01/27/22 1126 500 mg at 01/27/22 1126    sodium chloride flush 0.9 % injection 5-40 mL  5-40 mL IntraVENous 2 times per day [facility], APRN - CNP   10 mL at 01/27/22 0825    sodium chloride flush 0.9 % injection 5-40 mL  5-40 mL IntraVENous PRN [facility], APRN - CNP        ondansetron (ZOFRAN-ODT) disintegrating tablet 4 mg  4 mg Oral Q8H PRN [facility], APRN - CNP        Or    ondansetron (ZOFRAN) injection 4 mg  4 mg IntraVENous Q6H PRN [facility], APRN - CNP        polyethylene glycol (GLYCOLAX) packet 17 g  17 g Oral Daily PRN [facility], APRN - CNP        acetaminophen (TYLENOL) tablet 650 mg  650 mg Oral Q6H PRN [facility], APRN - CNP   650 mg at 01/24/22 2227    Or    acetaminophen (TYLENOL) suppository 650 mg  650 mg Rectal Q6H PRN [facility], APRN - CNP        sodium chloride flush 0.9 % injection 5-40 mL  5-40 mL IntraVENous PRN [facility], APRN - CNP        [Held by provider] enoxaparin (LOVENOX) injection 40 mg  40 mg SubCUTAneous Daily [facility], APRN - CNP           Allergies:  No Known Allergies    Problem List:    Patient Active Problem List   Diagnosis Code    HTN (hypertension) I10    Chronic depression F32. A    CHF (congestive heart failure) (HCC) I50.9    Thrombocytopenia (HCC) D69.6    Moderate episode of recurrent major depressive disorder (HCC) F33.1    Renal failure syndrome N19    Hyperkalemia E87.5    Isolated non-nephrotic proteinuria R80.0    ALVIN (acute kidney injury) ([facility] [address] N17.9    Chronic right-sided heart failure (HCC) I50.812    Pulmonary HTN (HCC) I27.20    Hypotension due to hypovolemia I95.89, E86.1    Acute renal failure superimposed on stage 4 chronic kidney disease (HCC) N17.9, N18.4    Pressure injury of sacral region, stage 4 (HCC) L89.154    Acute respiratory failure (HCC) J96.00    Flash pulmonary edema (HCC) J81.0    Shock (HCC) R57.9    Aspiration pneumonia of left lung (HCC) J69.0    Pleural effusion J90    Paroxysmal atrial fibrillation (HCC) I48.0    Hemoptysis R04.2    Chronic respiratory failure with hypoxia (HCC) J96.11    Pneumothorax, right J93.9    Collapse of left lung J98.11    Abnormal chest x-ray R93.89    Acute on chronic respiratory failure with hypoxia (HCC) J96.21    Retroperitoneal hematoma K66.1       Past Medical History:        Diagnosis Date    CHF [facility]congestive heart failure) ([facility])     Dr. Mihaela Restrepo Depression     Gout attack     Hypertension     Osteoarthritis     Pulmonary artery hypertension ([facility] [address]     [facility]-- Dr. Dorian Hill-- Dr. Mihaela Restrepo Renal calculi     Dr. Ricky Paulson [facility])        Past Surgical History:        Procedure Laterality Date    APPENDECTOMY  1960    BRONCHOSCOPY N/A 11/22/2021    BRONCHOSCOPY performed by Arnold Posada MD at [facility]    BRONCHOSCOPY N/A 11/30/2021    BRONCHOSCOPY WITHOUT FLURO performed by Arnold Posada MD at [facility]   [facility] [address] N/A 11/24/2021    EGD PEG TUBE PLACEMENT performed by Joyce Jensen MD at [facility]    IR CHEST TUBE INSERTION  11/26/2021    IR CHEST TUBE INSERTION 11/26/2021 [facility]    IR NEPHROSTOMY PERCUTANEOUS LEFT  11/1/2021    IR NEPHROSTOMY PERCUTANEOUS LEFT 11/1/2021 Cindy Grace MD [facility]    PRESSURE ULCER DEBRIDEMENT Right 8/6/2021    EXCISION RIGHT BUTTOCK WOUND AND CLOSURE performed by Coralie Meigs, MD at [facility] History:    Social History     Tobacco Use    Smoking status: Never Smoker    Smokeless tobacco: Never Used   Substance Use Topics    Alcohol use:  No Counseling given: Not Answered      Vital Signs (Current): There were no vitals filed for this visit. BP Readings from Last 3 Encounters:   01/27/22 (!) 141/65   12/01/21 (!) 96/48   11/30/21 (!) 96/46       NPO Status:                                                                                 BMI:   Wt Readings from Last 3 Encounters:   01/25/22 171 lb 6.4 oz (77.7 kg)   12/01/21 168 lb 9 oz (76.5 kg)   10/25/21 164 lb (74.4 kg)     There is no height or weight on file to calculate BMI.    CBC:   Lab Results   Component Value Date    WBC 24.7 01/27/2022    RBC 3.20 01/27/2022    RBC 4.15 01/09/2018    HGB 9.5 01/27/2022    HCT 32.1 01/27/2022    .3 01/27/2022    RDW 12.9 01/09/2018     01/27/2022       CMP:   Lab Results   Component Value Date     01/27/2022    K 3.9 01/27/2022    K 4.0 12/01/2021     01/27/2022    CO2 30 01/27/2022    BUN 93 01/27/2022    CREATININE 1.9 01/27/2022    LABGLOM 26 01/27/2022    GLUCOSE 98 01/27/2022    GLUCOSE 103 01/09/2018    PROT 5.9 01/27/2022    CALCIUM 9.6 01/27/2022    BILITOT 0.2 01/27/2022    ALKPHOS 106 01/27/2022    AST 19 01/27/2022    ALT 11 01/27/2022       POC Tests:   No results for input(s): POCGLU, POCNA, POCK, POCCL, POCBUN, POCHEMO, POCHCT in the last 72 hours.     Coags:   Lab Results   Component Value Date    INR 1.07 01/21/2022    APTT 30.6 12/02/2021       HCG (If Applicable): No results found for: PREGTESTUR, PREGSERUM, HCG, HCGQUANT     ABGs: No results found for: PHART, PO2ART, DCZ7ZNA, FSK4MIN, BEART, X5CKAWUE     Type & Screen (If Applicable):  Lab Results   Component Value Date    LABRH NEG 01/21/2022       Drug/Infectious Status (If Applicable):  Lab Results   Component Value Date    HEPCAB Negative 10/25/2021       COVID-19 Screening (If Applicable):   Lab Results   Component Value Date    COVID19 NOT  DETECTED 10/27/2021           Anesthesia Evaluation  Patient summary reviewed and Nursing notes reviewed no history of anesthetic complications:   Airway: Mallampati: Unable to assess / NA       Comment: On a vent   Dental: normal exam         Pulmonary: breath sounds clear to auscultation  (+) pneumonia:                            ROS comment: Respiratory failure with vent dependence and tracheostomy   Cardiovascular:  Exercise tolerance: poor (<4 METS),   (+) hypertension:, CHF:,       ECG reviewed  Rhythm: regular  Rate: normal                    Neuro/Psych:   (+) psychiatric history:            GI/Hepatic/Renal:   (+) renal disease: ARF and dialysis,           Endo/Other: Negative Endo/Other ROS                    Abdominal:       Abdomen: soft. Vascular: negative vascular ROS. Other Findings:               Anesthesia Plan      general     ASA 4       Induction: inhalational and intravenous. MIPS: Postoperative opioids intended and Prophylactic antiemetics administered. Anesthetic plan and risks discussed with patient and sibling. Plan discussed with CRNA.                   Luz Aguilar DO   1/27/2022

## 2022-01-27 NOTE — CONSULTS
Consult History & Physical      Patient:  Mariana Snyder  YOB: 1950  MRN: [id number]     Acct: [de-identified]    Chief Complaint:  CBD stone    Date of Service: Pt seen/examined in consultation on 1/27/2022    History Of Present Illness:      73 y.o. female who we are asked to see/evaluate by Carren Landau, MD for medical management of CBD stone. HPI from chart review and RN. She was admitted 01/20/22 for AMS a d decreased responsiveness. She was noted to be hypoxic and placed on BiPAP. She was intubated 01/24/22. Recently admitted 10/27/21-12/01/21 for severe PNA, trach placed 11/17/21 and decannulated 12/23/21. She has a stage IV decubitus ulcer with concern for osteomyelitis. She has a percutaneous nephrostomy tube on the left side for hydronephrosis with left-sided staghorn calculi. Nephrostomy tube changed 01/12/22. Today she underwent laparoscopic assisted diverting transverse loop colostomy, by Dr. Alva Da Silva. US RUQ 01/21/22 demonstrated cholelithiasis with CBD measuring 14 mm. 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 LFTs within normal limits. Past Medical History:    Past Medical History:   Diagnosis Date    [facility]congestive [facility] failure) [facility])     Dr. Olivia Saunders Depression     Gout attack     Hypertension     Osteoarthritis     Pulmonary artery hypertension ([address]     [facility]-- Dr. Bales Seen-- Dr. Olivia Saunders Renal calculi     Dr. Sukh Hdez [facility])        Home Medications:  Prior to Admission medications    Medication Sig Start Date End Date Taking?  Authorizing Provider   allopurinol (ZYLOPRIM) 100 MG tablet Take 100 mg by mouth daily   Yes Historical Provider, MD   famotidine (PEPCID) 20 MG tablet 20 mg by PEG Tube route daily   Yes Historical Provider, MD   hydrOXYzine (ATARAX) 25 MG tablet 25 mg by PEG Tube route every 8 hours as needed for Anxiety   Yes Historical Provider, MD   metoclopramide (REGLAN) 5 MG/5ML solution 5 mg by PEG Tube route every 8 hours   Yes Historical Provider, MD   midodrine (PROAMATINE) 5 MG tablet 10 mg by PEG Tube route 3 times daily   Yes Historical Provider, MD   piperacillin-tazobactam (ZOSYN) 2-0.25 GM per 50ML IVPB Infuse 2,250 mg intravenously every 8 hours   Yes Historical Provider, MD   senna (SENOKOT) 8.8 MG/5ML SYRP syrup Take 5 mLs by mouth nightly as needed   Yes Historical Provider, MD   acetaminophen (TYLENOL) 650 MG extended release tablet Take 650 mg by mouth every 8 hours as needed for Pain   Yes Historical Provider, MD   metoprolol tartrate (LOPRESSOR) 25 MG tablet Take 0.5 tablets by mouth 2 times daily 10/25/21   LANE Garibay CNP   sodium hypochlorite (DAKINS) 0.125 % SOLN external solution Apply Dakin's moistened gauze dressings to wound twice daily and as needed.  8/24/21   LANE Méndez CNP   sodium chloride 1 g tablet Take 1 tablet by mouth 2 times daily 8/3/21   Dale Aponte MD   FLUoxetine (PROZAC) 40 MG capsule Take 1 capsule by mouth daily 7/16/21   El Garay MD   potassium chloride (KLOR-CON M) 10 MEQ extended release tablet Take 1 tablet by mouth every other day 5/16/21   LANE Alarcon CNP   bumetanide (BUMEX) 1 MG tablet Take 1 tablet by mouth daily 5/16/21   LANE Alarcon CNP   Multiple Vitamins-Minerals (MULTIVITAMIN WOMEN PO) Take 1 tablet by mouth daily    Historical Provider, MD   Riociguat (ADEMPAS) 2.5 MG TABS Take 2.5 mg by mouth 3 times daily    Historical Provider, MD   docusate sodium (COLACE) 100 MG capsule Take 100 mg by mouth as needed     Historical Provider, MD   aspirin 81 MG tablet Take 81 mg by mouth daily Historical Provider, MD       Surgical History:  Past Surgical History:   Procedure Laterality Date    APPENDECTOMY  1960    BRONCHOSCOPY N/A 11/22/2021    BRONCHOSCOPY performed by Luis Alberto Pierce MD at [facility]    BRONCHOSCOPY N/A 11/30/2021    BRONCHOSCOPY WITHOUT FLURO performed by Luis Alberto Pierce MD at [facility]    GASTROSTOMY TUBE PLACEMENT N/A 11/24/2021    EGD PEG TUBE PLACEMENT performed by Kenny Buck MD at [facility]    IR [address]  11/26/2021    IR CHEST TUBE INSERTION 11/26/2021 [facility]    IR NEPHROSTOMY PERCUTANEOUS LEFT  11/1/2021    IR NEPHROSTOMY PERCUTANEOUS LEFT 11/1/2021 Dionisio Jones MD [facility]    PRESSURE ULCER DEBRIDEMENT Right 8/6/2021    EXCISION RIGHT BUTTOCK WOUND AND CLOSURE performed by Sveta Hurtado MD at [place] [address] History:  Family History   Problem Relation Age of Onset    Diabetes Father    Justyna Larger Arthritis Mother     COPD Mother     Diabetes Sister     Heart Disease Maternal Uncle     Breast Cancer Niece 36    Sleep Apnea Brother     Asthma Neg Hx     Birth Defects Neg Hx     Cancer Neg Hx     Depression Neg Hx     Early Death Neg Hx     Hearing Loss Neg Hx     High Blood Pressure Neg Hx     High Cholesterol Neg Hx     Kidney Disease Neg Hx     Learning Disabilities Neg Hx     Mental Illness Neg Hx     Mental Retardation Neg Hx     Miscarriages / Stillbirths Neg Hx     Stroke Neg Hx     Substance Abuse Neg Hx     Vision Loss Neg Hx     Other Neg Hx        Past GI History:  EGD with PEG 11/2021    Allergies:  Patient has no known allergies. Social History:   TOBACCO:   reports that she has never smoked. She has never used smokeless tobacco.  ETOH:   reports no history of alcohol use.     Review Of Systems  GENERAL: No fever  EYES:  ROSANNE  CARDIOVASCULAR: ROSANNE   RESPIRATORY:  +intubated  GI:  See HPI  MUSCULOSKELETAL: ROSANNE  :   No dysuria or hematuria. SKIN:  No rashes or jaundice. NEUROLOGIC:  No seizures   PSYCH:  No anxiety    ENDOCRINE:  No polyuria or polydipsia. BLOOD:  +anemia     PHYSICAL EXAM:  BP (!) 141/65   Pulse 93   Temp 98.8 °F (37.1 °C) (Bladder)   Resp 22   Ht 4' 9\" (1.448 m)   Wt 171 lb 6.4 oz (77.7 kg)   SpO2 98%   BMI 37.09 kg/m²     General appearance: Acute on chronically ill appearing female  HEENT: Normal cephalic, atraumatic without obvious deformity. Pupils equal, round, and reactive to light. Neck: Supple, with full range of motion. No jugular venous distention. Trachea midline. Respiratory:  Normal respiratory effort. Clear to auscultation, bilaterally without Rales/Wheezes/Rhonchi. Cardiovascular: Regular rate and rhythm without murmurs, rubs or gallops. Abdomen: Soft, obese, non-tender, non-distended with active bowel sounds. PEG tube intact & patent. Musculoskeletal: No clubbing, cyanosis bilaterally. Trace BLE edema. Skin: Pink, warm, dry. No rashes or lesions. Psychiatric: Intubated & sedated    Labs:   Recent Labs     01/27/22  0535   WBC 24.7*   HGB 9.5*   HCT 32.1*   *     Recent Labs     01/26/22 2030 01/26/22 2030 01/27/22  0535      < > 146*   K 3.3*   < > 3.9      < > 100   CO2 32   < > 30   BUN 91*   < > 93*   CREATININE 2.0*   < > 1.9*   CALCIUM 9.3   < > 9.6   PHOS 3.9  --   --     < > = values in this interval not displayed. Recent Labs     01/27/22  0535   AST 19   ALT 11   BILIDIR <0.2   BILITOT 0.2*   ALKPHOS 106     Radiology:   US RUQ 01/21/22  Findings:       Study was limited by abdominal bandages.       Liver normal size and echotexture. Right lobe 18.3 cm length. Pancreas partially obscured, no definite abnormality.       Cholelithiasis entire gallbladder lumen without wall thickening. Common duct 14 mm diameter.    No ultrasonographic Joseph sign.           Impression   Impression:       Cholelithiasis considerable ductal dilation MRCP 01/26/22  Impression   1. Small 2 mm distal common bile duct stone with mild dilatation of the    common bile duct at 8 mm (upper normal 7 mm for patient's age). No    intrahepatic biliary dilatation. 2. Cholelithiasis without gallbladder wall edema. 3. 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   4. Left subcapsular renal hematoma measuring 2.2 cm in width with    extension to the left posterior paranephric spaces is seen on recent CT. Underlying left renal stones. 5. Benign right renal cysts. 6. Partially seen bilateral pleural effusions. Mild upper abdominal    ascites. Code Status: Full Code    ASSESSMENT:  1. Acute on chronic hypoxic respiratory failure s/p intubation  2. PNA- on ATBs  3. Choledocholithiasis, non-obstructing, LFTs within normal limits  4. Macrocytic anemia- no GI bleeding noted  5. Minimally dilated CBD at 8 mm  6. UTI- on ATBs  7. Stage IV decubitus ulcer  8. S/P laparoscopic assisted diverting transverse loop colostomy 01/27/22  9. CKD  10. Pulmonary HTN  11. Left obstructive uropathy s/p left nephrostomy tube  12. ANCA associated vasculitis  13. Retroperitoneal hematoma  14. Leukocytosis  15. Hypoalbuminemia    PLAN:     ATBs per primary   TF per dietary recs   No signs of obstruction from small CBD stone, LFTs within normal limits. No plan for ERCP at this time given acuity of patient and current medical problems. Risks outweigh the benefits at this time, especially given possible complication of post ERCP pancreatitis. Will plan for OP ERCP.    Monitor HFP   Urology & general surgery on board   RN updated   ICU supportive care   Will need a 3-4 week follow-up with Dr. Elizabeth Millan signing off       Case reviewed and impression/plan reviewed in collaboration with Dr. Gill Devine  Electronically signed by LANE Mccormick - CNP on 1/27/2022 at 2:11 PM    [facility]  Thank you for the consultation.       Very acutely ill patient requiring an ICU bed  High complexity decision making

## 2022-01-27 NOTE — ANESTHESIA POSTPROCEDURE EVALUATION
Department of Anesthesiology  Postprocedure Note    Patient: Gypsy Rodriguez  MRN: [id number]  YOB: 1950  Date of evaluation: 1/27/2022  Time:  3:50 PM     Procedure Summary     Date: 01/27/22 Room / Location: [facility] 08 / [facility]    Anesthesia Start: 4363 Anesthesia Stop: 7706    Procedure: LAPAROSCOPY WITH DIVERTING LOOP COLOSTOMY (N/A ) Diagnosis: (STAGE IV)    Surgeons: Kenneth Anne MD Responsible Provider: Inge Bernardo MD    Anesthesia Type: general ASA Status: 4          Anesthesia Type: general    Michelle Phase I:      Michelle Phase II:      Last vitals: Reviewed and per EMR flowsheets.        Anesthesia Post Evaluation    Patient location during evaluation: PACU  Patient participation: complete - patient participated  Level of consciousness: awake and alert  Airway patency: patent  Nausea & Vomiting: no vomiting and no nausea  Complications: no  Cardiovascular status: hemodynamically stable  Respiratory status: acceptable  Hydration status: stable

## 2022-01-27 NOTE — PROGRESS NOTES
Patient was having increased ectopy on the heart monitor at 1915 during shift handoff. 2000 Notified MADELEINE Jamison, of increased ectopy and ordered blood work. 81 Maranda Wilson CNP, of electrolyte results, including GFR 25. Ordered to follow replacement protocols.

## 2022-01-27 NOTE — PROGRESS NOTES
Present to pt room for follow up of sacral wound. Rolled pt to left side for assessment of wound. Wound photo and assessment below. Cleansed wound with dakins solution and gauze. Pat dry with clean gauze. Applied dakins moist gauze to wound, covered with ABD pad and secured with hy-tape. Applied triad to denuded areas and applied moisturizing lotion to favio rectal dry skin. Staff to continue to change twice daily. Pillow placed under right side. All extremities elevated. Pt on hercules dream air support surface with alternating pressure and microclimate control. Padilla catheter and flexiseal in place. Pt is going for diverting colostomy today to help with optimal wound healing. Recommend staff to continue wound care orders as followed. Bed in low, call light in reach. 1/27/22    Wound type: Stage 4 coccyx pressure injury  Wound size: 5cm x 9cm x 2.8cm  Undermining or Tunneling: Undermining from 9-3 of 2.3cm  Wound assessment/color: red,  yellow  Drainage amount: Moderate  Drainage description: Serosangunious  Odor: None  Margins: Attached  Favio wound: denuded, dry peeling  Exposed structure: Bone    1/21/22    Wound type: Stage 4 coccyx pressure injury  Wound size: 4cm x 7cm x 3.8cm  Undermining or Tunneling: Undermining from 6-12 of 2.7cm  Wound assessment/color: 80% red, 20% yellow/black  Drainage amount: Moderate  Drainage description: Serosangunious  Odor: None  Margins: Attached  Favio wound:  Macerated, fragile  Exposed structure: Bone    11/23/21    Wound type: worsening stage 3 pressure injury POA  Wound size: 6cm x 11cm x 5cm  Wound assessment/color: red, pink, little yellow  Drainage amount: moderate  Drainage description: serosanguinous  Odor: none  Margins: attached  Favio wound: denuded  Exposed structure: subcutaneous    6x11x5     11-4-21       Wound type: healing stage 3 pressure injury POA  Wound size: 4cm x 4.2cm x 2.9cm  Undermining or Tunneling: none  Wound assessment/color: red/yellow  Drainage

## 2022-01-27 NOTE — OP NOTE
[address] [address]  Operative Report    PATIENT NAME: Gypsy RECORD NO. [id number]  SURGEON: Mahogany Toure MD MD FACS  Primary Care Physician: Shadi Oro MD  Date: 1/27/2022, 1:52 PM     PROCEDURE PERFORMED: Laparoscopic assisted diverting transverses loop colostomy  PREOPERATIVE DIAGNOSIS:   Active Hospital Problems    Diagnosis Date Noted    Retroperitoneal hematoma [K66.1] 01/21/2022    Acute on chronic respiratory failure with hypoxia ([facility] [J96.21] 01/20/2022    Pressure injury of sacral region, stage 4 ([facility] [L89.154] 07/29/2021    Pulmonary HTN ([facility] [I27.20] 09/03/2019      POSTOPERATIVE DIAGNOSIS: Same, path pending  SURGEON:  Mahogany Toure MD MD FACS  ESTIMATED BLOOD LOSS:  0  ml  SPECIMEN: none  COMPLICATIONS:  None; patient tolerated the procedure well. DRAINS: diverting transverse loop colostomy  DISPOSITION: Intensive Care  CONDITION: stable      Narrative:    Patient brought to the operating room moved over to the operating room table general trach anesthesia administered by anesthesia. Abdomen clipped prepped draped sterilely with DuraPrep 3 minutes allowed to pass timeout was taken and confirmed. In the right lower quadrant using a 5 mm Optiview port and a 5 mm scope we advanced into the abdominal cavity under direct vision insufflated the abdomen to pressure 14 peritoneal surfaces glistening there was a large omentum present he had no redundant descending colon and the sigmoid colon was way down the pelvis none of which would have reached for a diverting colostomy easily. So at this point I elected to place an another 5 mm port in the low midline and using an atraumatic grasper was able to flip the omentum over the colon to identify the location of the transverse colon it would reach up to the abdominal wall and at this point chose a site just to the left of the falciform ligament.   While the abdomen was still insufflated and the scope in place a

## 2022-01-27 NOTE — PROGRESS NOTES
CRITICAL CARE PROGRESS NOTE      Patient:  Mohini Abad    Unit/Bed:4D-07/007-A  YOB: 1950  MRN: [id number]   PCP: Shadi Oro MD  Date of Admission: 1/20/2022  Chief Complaint:-Acute respiratory failure     Assessment and Plan:       Acute on chronic hypoxic respiratory failure: Patient was intubated 1/24 for airway protection. Maintain peak pressure of 35. Plan spontaneous breathing trial after surgery planned for 1/27. Pneumonia: Meropenem day #3, pneumonia panel positive for Pseudomonas. Leukocytosis: WBC 24.7, afebrile. Continue meropenem. Patient requiring no vasopressors. Macrocytic anemia: Hemoglobin 9.5, no signs of bleeding. Patient on weekly Retacrit   Chronic kidney injury: Creatinine 1.9, stable. Patient making adequate urine. Monitor. Retroperitoneal hematoma: Hemoglobin is stable. Urology was consulted general surgery was consulted. No plans for surgical intervention. Of note percutaneous nephrostomy tube was changed on 1/12/2022. Dilated common bile duct: Ultrasound gallbladder reported common bile duct of 14 mm. MRCP without contrast.  MRCP reported small stone in bile duct with 8 mm dilation. GI consulted   Urinary tract infection present on arrival: Patient continues on meropenem day #3. Previous cultures were ESBL producing. Pulmonary hypertension: Patient continues on diuresis. Left obstructive uropathy: Nephrostomy tube in place. This was changed 1/12/2022. Urology was consulted with no plans for intervention at this time. Stage IV decubitus ulcer: General surgery consulted for diverting colostomy, plans for 1/27/2022  Gout: Allopurinol  Sleep apnea: Noted. Patient continues on ventilator  ANCA associated vasculitis: Will need biopsy when more medically stable.   Obesity: Noted, BMI 37.09     INITIAL H AND P AND ICU COURSE:  Ramon Melissa is a 81-year-old white female who presented to [facility] on 1/20/2022 as a transfer from [facility].  Per report patient has a past medical history of lifetime non-smoker, obstructive sleep apnea, paroxysmal atrial fibrillation, pulmonary artery hypertension, heart failure with preserved ejection fraction, hypertension, gout, depression, osteoarthritis, renal calculi, thrombocytopenia, stage IV decubitus ulcer. Per report patient initially presented to [facility] from October 27 through December 1, 2021 when she was transferred to [facility].  During the hospitalization she suffered from severe pneumonia with pleural effusion. She had a tracheostomy tube placed on 11/17/2020 2021 and was decannulated on 12/23/2021. She also has a stage IV decubitus ulcer with concern for osteomyelitis, ALVIN, ANCA associated vasculitis, hydronephrosis with left-sided staghorn calculi with percutaneous nephrostomy tube. Percutaneous nephrostomy tube was changed on 1/12/2022. Over the last few days in [place] family had noticed her responsiveness and mental status had been declining. She had been complaining of back pain. She became hypoxic and hypercapnic and was transitioned to BiPAP. On 1/20/2022 she was transferred to [facility]'s to the ICU for further management. Patient did require intubation on on 1/24/2022 for airway protection. Past Medical History: per HPI  Family History:  Mother: Arthritis, COPD Father: Diabetes  Social History: Lifetime non-smoker, denies alcohol drug use     ROS   Sedated on mechanical ventilation    Scheduled Meds:   chlorhexidine  15 mL Mouth/Throat BID    meropenem  500 mg IntraVENous Q12H    lactobacillus  1 capsule Oral Daily with breakfast    budesonide-formoterol  1 puff Inhalation BID    miconazole   Topical BID    multivitamin+  30 mL Oral Daily    midodrine  15 mg Per G Tube Q8H    allopurinol  100 mg Oral Daily    aspirin  81 mg Oral Daily    famotidine  20 mg PEG Tube Daily    FLUoxetine  40 mg Oral Daily    [Held by provider] Riociguat 2.5 mg Oral TID    sodium hypochlorite   Irrigation Daily    ferrous sulfate  325 mg Oral Q MWF    epoetin prince-epbx  4,000 Units SubCUTAneous Weekly    chlorothiazide (DIURIL) IVPB  500 mg IntraVENous Q12H    sodium chloride flush  5-40 mL IntraVENous 2 times per day    [Held by provider] enoxaparin  40 mg SubCUTAneous Daily     Continuous Infusions:   midazolam 2 mg/hr (01/27/22 0824)    sodium chloride 20 mL/hr at 01/23/22 2223    sodium chloride      bumetanide 0.1 mg/mL infusion 1 mg/hr (01/27/22 0824)       PHYSICAL EXAMINATION:  T:  99.1. P:  89. RR:  18. B/P:  125/53. FiO2:  30. O2 Sat:  99.  I/O:  2916/3645  Body mass index is 37.09 kg/m². PC: 16/6: TV: 340: RRTotal: 20: Ti:1 sec  General: Acute on chronically ill-appearing white female  HEENT:  normocephalic and atraumatic. No scleral icterus. PERR  Neck: supple. No Thyromegaly. Lungs: clear to auscultation. No retractions  Cardiac: RRR. No JVD. Abdomen: soft. Nontender. Extremities:  No clubbing, cyanosis. Trace edema   Vasculature: capillary refill < 3 seconds. Palpable dorsalis pedis pulses. Skin:  warm and dry. Psych: Sedated on mechanical ventilation  Lymph:  No supraclavicular adenopathy. Neurologic:  No focal deficit. No seizures. Data: (All radiographs, tracings, PFTs, and imaging are personally viewed and interpreted unless otherwise noted). Sodium 146, potassium 3.9, chloride 100, CO2 30, BUN 93, creatinine 1.9, anion gap 16.0, glucose 98. WBC 24.7, hemoglobin 9.5, hematocrit 32.1, platelet count 559  Telemetry shows normal sinus rhythm  MRI abdomen MRCP 1/26/2022 reports small 2 mm distal common bile duct stone    Meets Continued ICU Level Care Criteria:    [x] Yes   [] No - Transfer Planned to listed location:  [] HOSPITALIST CONTACTED-      Case and plan discussed with Dr. Yady Reyes. Electronically signed by Jennie Ocampo. LANE Massey - CNP  CRITICAL CARE SPECIALIST  Patient seen by me.   Case discussed with nurse practitioner. Patient status post diverting colostomy. Continue with meropenem. Continue with mechanical ventilator support. Initiate trial of spontaneous breathing trial.  Italicized font represents changes to the note made by me. CC time 35 minutes. Time was discontiguous. Time does not include procedure. Time does include my direct assessment of the patient and coordination of care. Time represents more than 50% of the time involved with patient care by the [address] [place] [address] team.  Electronically signed by Luiz Lay.  Brigida Mayers MD.

## 2022-01-27 NOTE — CARE COORDINATION
1/27/22, 3:54 PM EST    DISCHARGE ON GOING EVALUATION    Alia Silva day: 7  Location: -07/007-A Reason for admit: Acute on chronic respiratory failure with hypoxia [facility]) [J96.21]   Procedure:   1/18 Left nephrostomy tube exchanged  1/20 PICC right   1/20 CT Chest: Bilateral pleural effusions and atelectasis with cardiomegaly; question pulmonary edema  1/20 CT Abd/pelvis: Left perinephric and renal subcapsular hematomas; Loculated left retroperitoneal fluid, question hematoma versus abscess; Anasarca pattern noted with body wall edema  1/20 US GB/RUQ: Cholelithiasis considerable ductal dilation  1/21 BLE Venous doppler: Neg for DVT  1/21 Echo with EF 60%  1/24 CT Head: No acute findings  1/24 Intubated  1/26 MRCP: Small 2 mm distal common bile duct stone with mild dilatation of the common bile duct at 8 mm (upper normal 7 mm for patient's age). 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 Underlying left renal stones; Benign right renal cysts; Partially seen bilateral pleural effusions. Mild upper abdominal ascites. 1/27 Laparoscopic assisted diverting transverses loop colostomy    Barriers to Discharge: Went for diverting colostomy in OR today with Dr. Marco A Stacy. GI consulted for CBD stone. Remains on vent w/ETT on PCV, peep 6, FIO2 30%, sats 97%. Tmax 100.2. NSR. Follows commands. Flush Nephrostomy tube Q8H. Dietitian and Wound Care following. Respiratory culture +Proteus mirabilis. SLP. Telemetry, PICC, PEG w/TF, nephrostomy tube w/drainage kings w/sediment, colostomy, flexiseal, seaman, wound care, SCDs.  Bumex @ 1 mg/hr, versed @ 2 mg/hr, allopurinol, asa, inhaler, IV diuril, sq retacrit, pepcid, ferrous sulfate, prozac, lactobacillus, IV merrem, desenex, midodrine, dakins daily, Electrolyte replacement protocols. Na+ 146, BUN 93, Creat 1.9, alb 2.6, wbc 24.7, hgb 9.5. PCP: Colleen Razo MD  Readmission Risk Score: 24 ( )%  Patient Goals/Plan/Treatment Preferences: From [place]; does not want to return. Plan for SNF; referrals out to [address] [place] [address] - [facility] choice, and [facility]. Will need PT/OT when appropriate.

## 2022-01-28 LAB
ANION GAP SERPL CALCULATED.3IONS-SCNC: 14 MEQ/L (ref 8–16)
BASOPHILS # BLD: 0.9 %
BASOPHILS ABSOLUTE: 0.2 THOU/MM3 (ref 0–0.1)
BUN BLDV-MCNC: 94 MG/DL (ref 7–22)
CALCIUM SERPL-MCNC: 9.2 MG/DL (ref 8.5–10.5)
CHLORIDE BLD-SCNC: 99 MEQ/L (ref 98–111)
CO2: 33 MEQ/L (ref 23–33)
CREAT SERPL-MCNC: 1.8 MG/DL (ref 0.4–1.2)
EOSINOPHIL # BLD: 2.3 %
EOSINOPHILS ABSOLUTE: 0.4 THOU/MM3 (ref 0–0.4)
ERYTHROCYTE [DISTWIDTH] IN BLOOD BY AUTOMATED COUNT: 17.2 % (ref 11.5–14.5)
ERYTHROCYTE [DISTWIDTH] IN BLOOD BY AUTOMATED COUNT: 62.1 FL (ref 35–45)
GFR SERPL CREATININE-BSD FRML MDRD: 28 ML/MIN/1.73M2
GLUCOSE BLD-MCNC: 83 MG/DL (ref 70–108)
HCT VFR BLD CALC: 30 % (ref 37–47)
HEMOGLOBIN: 9.1 GM/DL (ref 12–16)
IMMATURE GRANS (ABS): 0.24 THOU/MM3 (ref 0–0.07)
IMMATURE GRANULOCYTES: 1.3 %
LYMPHOCYTES # BLD: 6.9 %
LYMPHOCYTES ABSOLUTE: 1.2 THOU/MM3 (ref 1–4.8)
MCH RBC QN AUTO: 29.8 PG (ref 26–33)
MCHC RBC AUTO-ENTMCNC: 30.3 GM/DL (ref 32.2–35.5)
MCV RBC AUTO: 98.4 FL (ref 81–99)
MONOCYTES # BLD: 6 %
MONOCYTES ABSOLUTE: 1.1 THOU/MM3 (ref 0.4–1.3)
NUCLEATED RED BLOOD CELLS: 0 /100 WBC
PLATELET # BLD: 444 THOU/MM3 (ref 130–400)
PMV BLD AUTO: 8.9 FL (ref 9.4–12.4)
POTASSIUM SERPL-SCNC: 3.7 MEQ/L (ref 3.5–5.2)
RBC # BLD: 3.05 MILL/MM3 (ref 4.2–5.4)
SEG NEUTROPHILS: 82.6 %
SEGMENTED NEUTROPHILS ABSOLUTE COUNT: 14.9 THOU/MM3 (ref 1.8–7.7)
SODIUM BLD-SCNC: 146 MEQ/L (ref 135–145)
WBC # BLD: 18 THOU/MM3 (ref 4.8–10.8)

## 2022-01-28 PROCEDURE — 94640 AIRWAY INHALATION TREATMENT: CPT

## 2022-01-28 PROCEDURE — 6360000002 HC RX W HCPCS: Performed by: PHARMACIST

## 2022-01-28 PROCEDURE — 2580000003 HC RX 258: Performed by: NURSE PRACTITIONER

## 2022-01-28 PROCEDURE — 99024 POSTOP FOLLOW-UP VISIT: CPT | Performed by: SURGERY

## 2022-01-28 PROCEDURE — 6370000000 HC RX 637 (ALT 250 FOR IP): Performed by: NURSE PRACTITIONER

## 2022-01-28 PROCEDURE — 6360000002 HC RX W HCPCS: Performed by: NURSE PRACTITIONER

## 2022-01-28 PROCEDURE — 36592 COLLECT BLOOD FROM PICC: CPT

## 2022-01-28 PROCEDURE — 6370000000 HC RX 637 (ALT 250 FOR IP): Performed by: STUDENT IN AN ORGANIZED HEALTH CARE EDUCATION/TRAINING PROGRAM

## 2022-01-28 PROCEDURE — 80048 BASIC METABOLIC PNL TOTAL CA: CPT

## 2022-01-28 PROCEDURE — 94003 VENT MGMT INPAT SUBQ DAY: CPT

## 2022-01-28 PROCEDURE — 2580000003 HC RX 258: Performed by: PHARMACIST

## 2022-01-28 PROCEDURE — 99291 CRITICAL CARE FIRST HOUR: CPT | Performed by: INTERNAL MEDICINE

## 2022-01-28 PROCEDURE — 94761 N-INVAS EAR/PLS OXIMETRY MLT: CPT

## 2022-01-28 PROCEDURE — 2000000000 HC ICU R&B

## 2022-01-28 PROCEDURE — 2500000003 HC RX 250 WO HCPCS: Performed by: INTERNAL MEDICINE

## 2022-01-28 PROCEDURE — 85025 COMPLETE CBC W/AUTO DIFF WBC: CPT

## 2022-01-28 RX ADMIN — SODIUM CHLORIDE, PRESERVATIVE FREE 10 ML: 5 INJECTION INTRAVENOUS at 08:06

## 2022-01-28 RX ADMIN — MICONAZOLE NITRATE: 2 POWDER TOPICAL at 01:44

## 2022-01-28 RX ADMIN — FERROUS SULFATE TAB 325 MG (65 MG ELEMENTAL FE) 325 MG: 325 (65 FE) TAB at 15:23

## 2022-01-28 RX ADMIN — BUDESONIDE AND FORMOTEROL FUMARATE DIHYDRATE 1 PUFF: 80; 4.5 AEROSOL RESPIRATORY (INHALATION) at 20:29

## 2022-01-28 RX ADMIN — ASPIRIN 81 MG CHEWABLE TABLET 81 MG: 81 TABLET CHEWABLE at 08:06

## 2022-01-28 RX ADMIN — CHLOROTHIAZIDE SODIUM 500 MG: 500 INJECTION, POWDER, LYOPHILIZED, FOR SOLUTION INTRAVENOUS at 10:27

## 2022-01-28 RX ADMIN — MICONAZOLE NITRATE: 2 POWDER TOPICAL at 20:33

## 2022-01-28 RX ADMIN — CHLORHEXIDINE GLUCONATE 0.12% ORAL RINSE 15 ML: 1.2 LIQUID ORAL at 20:33

## 2022-01-28 RX ADMIN — MEROPENEM 500 MG: 500 INJECTION, POWDER, FOR SOLUTION INTRAVENOUS at 12:18

## 2022-01-28 RX ADMIN — Medication 1 CAPSULE: at 08:06

## 2022-01-28 RX ADMIN — ALLOPURINOL 100 MG: 100 TABLET ORAL at 08:06

## 2022-01-28 RX ADMIN — Medication 30 ML: at 08:07

## 2022-01-28 RX ADMIN — MIDODRINE HYDROCHLORIDE 15 MG: 5 TABLET ORAL at 23:39

## 2022-01-28 RX ADMIN — CHLOROTHIAZIDE SODIUM 500 MG: 500 INJECTION, POWDER, LYOPHILIZED, FOR SOLUTION INTRAVENOUS at 20:38

## 2022-01-28 RX ADMIN — MEROPENEM 500 MG: 500 INJECTION, POWDER, FOR SOLUTION INTRAVENOUS at 23:44

## 2022-01-28 RX ADMIN — BUDESONIDE AND FORMOTEROL FUMARATE DIHYDRATE 1 PUFF: 80; 4.5 AEROSOL RESPIRATORY (INHALATION) at 09:44

## 2022-01-28 RX ADMIN — FAMOTIDINE 20 MG: 20 TABLET, FILM COATED ORAL at 08:06

## 2022-01-28 RX ADMIN — SODIUM CHLORIDE, PRESERVATIVE FREE 10 ML: 5 INJECTION INTRAVENOUS at 20:34

## 2022-01-28 RX ADMIN — MICONAZOLE NITRATE: 2 POWDER TOPICAL at 08:06

## 2022-01-28 RX ADMIN — CHLORHEXIDINE GLUCONATE 0.12% ORAL RINSE 15 ML: 1.2 LIQUID ORAL at 08:05

## 2022-01-28 RX ADMIN — CHLOROTHIAZIDE SODIUM 500 MG: 500 INJECTION, POWDER, LYOPHILIZED, FOR SOLUTION INTRAVENOUS at 01:45

## 2022-01-28 RX ADMIN — MIDODRINE HYDROCHLORIDE 15 MG: 5 TABLET ORAL at 06:12

## 2022-01-28 RX ADMIN — DAKIN'S SOLUTION 0.125% (QUARTER STRENGTH): 0.12 SOLUTION at 08:07

## 2022-01-28 RX ADMIN — ACETAMINOPHEN 650 MG: 325 TABLET ORAL at 10:30

## 2022-01-28 RX ADMIN — MIDODRINE HYDROCHLORIDE 15 MG: 5 TABLET ORAL at 15:23

## 2022-01-28 RX ADMIN — DOCUSATE SODIUM LIQUID 100 MG: 100 LIQUID ORAL at 08:05

## 2022-01-28 RX ADMIN — MEROPENEM 500 MG: 500 INJECTION, POWDER, FOR SOLUTION INTRAVENOUS at 00:53

## 2022-01-28 RX ADMIN — FLUOXETINE HYDROCHLORIDE 40 MG: 20 CAPSULE ORAL at 08:06

## 2022-01-28 ASSESSMENT — PULMONARY FUNCTION TESTS
PIF_VALUE: 20
PIF_VALUE: 21
PIF_VALUE: 20

## 2022-01-28 ASSESSMENT — PAIN SCALES - WONG BAKER
WONGBAKER_NUMERICALRESPONSE: 0
WONGBAKER_NUMERICALRESPONSE: 0

## 2022-01-28 ASSESSMENT — PAIN SCALES - GENERAL
PAINLEVEL_OUTOF10: 0

## 2022-01-28 NOTE — CARE COORDINATION
1/28/22, 7:52 AM EST    DISCHARGE ON GOING EVALUATION    Twyla Moritz day: 8  Location: E2-05/E2-05 Reason for admit: Acute on chronic respiratory failure with hypoxia [facility]) [J96.21]   Procedure:   1/18 Left nephrostomy tube exchanged  1/20 PICC right   1/20 CT Chest: Bilateral pleural effusions and atelectasis with cardiomegaly; question pulmonary edema  1/20 CT Abd/pelvis: Left perinephric and renal subcapsular hematomas; Loculated left retroperitoneal fluid, question hematoma versus abscess; Anasarca pattern noted with body wall edema  1/20 US GB/RUQ: Cholelithiasis considerable ductal dilation  1/21 BLE Venous doppler: Neg for DVT  1/21 Echo with EF 60%  1/24 CT Head: No acute findings  1/24 Intubated  1/26 MRCP: Small 2 mm distal common bile duct stone with mild dilatation of the common bile duct at 8 mm (upper normal 7 mm for patient's age). 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 Underlying left renal stones; Benign right renal cysts; Partially seen bilateral pleural effusions. Mild upper abdominal ascites. 1/27 Laparoscopic assisted diverting transverses loop colostomy       Barriers to Discharge: Remains intubated on vent, PCV+ mode, 30% FiO2, 2 liters oxygen, PEEP 6 with saturations at 95%. Tmax 100. WBC 18.0, creatinine 1.8. SBT. GI consulted for common bile duct stone. Versed drtorie. JIMI Guerra. PCP: Lesly Allen MD  Readmission Risk Score: 24 ( )%  Patient Goals/Plan/Treatment Preferences: From [place]; does not want to return.  Plan for SNF; referrals out to [facility], and [facility]. Will need PT/OT when appropriate.

## 2022-01-28 NOTE — PROGRESS NOTES
EN/PN NUTRITION SUPPORT    RECOMMENDATIONS/PLAN:   *Recommend restart Nepro at goal of 30 ml/hr. *Bolus 1 -  2.5 ounce liquid protein bottle daily. *Free H20 per physician - currently 30 ml TID. *Monitoring renal status for protein modular adjustment as appropriate. *Recommend SLP swallow evaluation post extubation. CURRENT NUTRITION THERAPIES  Diet NPO  ADULT TUBE FEEDING; PEG; Renal Formula; Continuous; 30; No; 30; Q 8 hours  Current Tube Feeding (TF) Orders:  · Feeding Route: PEG  · Formula: Renal Formula (Nepro)  · Schedule: Continuous (goal of 30ml/hour)  · Additives/Modulars: Protein (1/21 bolus 1 2.5ounce liquid protein bottle daily)  · Water Flushes: 30ml every 8h per physician  · Current TF & Flush Orders Provides: TF to restart today per RN  · Goal TF & Flush Orders Provides: Nepro at 30ml/hour with 1 protein modular daily = 1378 kcals (1274 TF, 104 modular) , 84 gms protein (58 TF, 26 modular), 115gms cho, 18gms fiber, 613ml free H20 (523 TF, 90 MD flush) in 885ml total volume (720 TF, 90 MD flush, 75 modular)/24h    COMPARATIVE STANDARDS  Energy (kcal):  ~3656-4056 kcal/day (18-20 kcal/kg); Weight Used for Energy Requirements:  Current (66 kg)     Protein (g):  50-84gms (1.2-2/kgm IBW) monitoring renal status; Weight Used for Protein Requirements:  Ideal (42kgm)        Fluid (ml/day):  per physician    NUTRITIONAL SUMMARY/STATUS: Pt. with no improvement from a nutritional standpoint AEB TF stopped on 1/27 for surgery with plan to restart Nepro TF today per RN & MD. Haley Glaser at risk for further nutritional compromise r/t admit with sepsis, UTI; intubated on 1/24; previous [facility] admit 10/27 - 12/1/21 for pneumonia then [facility] now back to [facility]; dysphagia d/t prolonged intubations - NPO at [address], Hx HD (none since 12/23/21) and vent/trach (out now), wound debridements, nephrostomy tube, retroperitoneal hematoma, LOS day 8 and underlying medical condition CHF, obesity.     NUTRITION RELATED FINDINGS:   Treatments: intubated, s/p colostomy on 1/27  TF Status: TF stopped on 1/27 for surgery. Per RN & MD, plan for TF to be restarted today. GI Status: flexiseal remains in place per RN with 700 mL output in 24 hours; 175 mL colostomy output in 24 hours. Belly soft per RN. Pertinent Labs: Na 146, Glucose 83, BUN 94, Cr. 1.8, K+ 3, MAP 83. Pertinent Meds: Colace PRN, Versed, Liquid Multivitamin, Iron, Culturelle  Current Weight: 145 lb 8.1 oz (66 kg) (1/28; +1 pitting BLE; +1 non-pitting BLE) - note: pt was on IV Bumex now discontinued. Admission Weight:  178 lb (80.7 kg) (1/20 with +2 edema)  Wounds: Pressure Injury,Stage IV,Surgical Incision,Unstageable (coccyx with possible osteomyelitis; debrided 8/16/21; unstageable right and left heels; nephrostomy tube; s/p colostomy on 1/27)    Please refer to initial nutrition assessment for additional details.    Walter Pratt, MS, RD, LD:    Contact Number: [phone]

## 2022-01-28 NOTE — PROGRESS NOTES
CRITICAL CARE PROGRESS NOTE      Patient:  Cb Kenny    Unit/Bed:E2-05/E2-05  YOB: 1950  MRN: [id number]   PCP: Elinor Paulson MD  Date of Admission: 1/20/2022  Chief Complaint:-Acute respiratory failure     Assessment and Plan:       Acute on chronic hypoxic respiratory failure: Patient was intubated 1/24 for airway protection. Maintain peak pressure of 35. Plan spontaneous breathing trial after surgery planned for 1/27. Pneumonia: Meropenem day #3, pneumonia panel positive for Pseudomonas. Leukocytosis: WBC 18.0, low grade fever. Continue meropenem. Patient requiring no vasopressors. Macrocytic anemia: Hemoglobin 9.1, no signs of bleeding. Patient on weekly Retacrit   Chronic kidney injury: Creatinine 1.8, stable. Patient making adequate urine. Monitor. Retroperitoneal hematoma: Hemoglobin is stable. Urology was consulted general surgery was consulted. No plans for surgical intervention. Of note percutaneous nephrostomy tube was changed on 1/12/2022. Dilated common bile duct: Ultrasound gallbladder reported common bile duct of 14 mm. MRCP without contrast.  MRCP reported small stone in bile duct with 8 mm dilation. GI consulted, no plans for intervention   Urinary tract infection present on arrival: Patient continues on meropenem day #3. Previous cultures were ESBL producing. Pulmonary hypertension: Patient continues on diuresis. Left obstructive uropathy: Nephrostomy tube in place. This was changed 1/12/2022. Urology was consulted with no plans for intervention at this time. Stage IV decubitus ulcer: General surgery consulted for diverting colostomy, s/p diverting colostomy 1/27  Gout: Allopurinol  Sleep apnea: Noted. Patient continues on ventilator  ANCA associated vasculitis: Will need biopsy when more medically stable.   Obesity: Noted, BMI 37.09     INITIAL H AND P AND ICU COURSE:  Leena Padilla is a 72-year-old white female who presented to [facility] on 1/20/2022 as a transfer from [facility].  Per report patient has a past medical history of lifetime non-smoker, obstructive sleep apnea, paroxysmal atrial fibrillation, pulmonary artery hypertension, heart failure with preserved ejection fraction, hypertension, gout, depression, osteoarthritis, renal calculi, thrombocytopenia, stage IV decubitus ulcer. Per report patient initially presented to [facility] from October 27 through December 1, 2021 when she was transferred to [facility].  During the hospitalization she suffered from severe pneumonia with pleural effusion. She had a tracheostomy tube placed on 11/17/2020 2021 and was decannulated on 12/23/2021. She also has a stage IV decubitus ulcer with concern for osteomyelitis, ALVIN, ANCA associated vasculitis, hydronephrosis with left-sided staghorn calculi with percutaneous nephrostomy tube. Percutaneous nephrostomy tube was changed on 1/12/2022. Over the last few days in [place] family had noticed her responsiveness and mental status had been declining. She had been complaining of back pain. She became hypoxic and hypercapnic and was transitioned to BiPAP. [facility]. Patient did require intubation on on 1/24/2022 for airway protection. Past Medical History: per HPI  Family History:  Mother: Arthritis, COPD Father: Diabetes  Social History: Lifetime non-smoker, denies alcohol drug use     ROS   Sedated on mechanical ventilation     Scheduled Meds:   chlorhexidine  15 mL Mouth/Throat BID    meropenem  500 mg IntraVENous Q12H    lactobacillus  1 capsule Oral Daily with breakfast    budesonide-formoterol  1 puff Inhalation BID    miconazole   Topical BID    multivitamin+  30 mL Oral Daily    midodrine  15 mg Per G Tube Q8H    allopurinol  100 mg Oral Daily    aspirin  81 mg Oral Daily    famotidine  20 mg PEG Tube Daily    FLUoxetine  40 mg Oral Daily    [Held by provider] Riociguat  2.5 mg Oral TID    sodium hypochlorite   Irrigation Daily    ferrous sulfate  325 mg Oral Q MWF    epoetin prince-epbx  4,000 Units SubCUTAneous Weekly    chlorothiazide (DIURIL) IVPB  500 mg IntraVENous Q12H    sodium chloride flush  5-40 mL IntraVENous 2 times per day    [Held by provider] enoxaparin  40 mg SubCUTAneous Daily     Continuous Infusions:   midazolam 2 mg/hr (01/28/22 0600)    sodium chloride 20 mL/hr at 01/27/22 1307    sodium chloride      bumetanide 0.1 mg/mL infusion 1 mg/hr (01/28/22 0600)       PHYSICAL EXAMINATION:  T:  99.9.  P:  86. RR:  16. B/P:  113/58. FiO2:  30. O2 Sat:  96.  I/O:  931/5025  Body mass index is 31.49 kg/m². PC: 14/6: TV: 339: RRTotal: 16: Ti:1 sec  General: Acute on chronically ill-appearing white female  HEENT:  normocephalic and atraumatic. No scleral icterus. PERR  Neck: supple. No Thyromegaly. Lungs: clear to auscultation. No retractions  Cardiac: RRR. No JVD. Abdomen: soft. Midline incision, colostomy  Extremities:  No clubbing, cyanosis. Trace edema  Vasculature: capillary refill < 3 seconds. Palpable dorsalis pedis pulses. Skin:  warm and dry. Psych: Awakes and follows basic commands on mechanical ventilation  Lymph:  No supraclavicular adenopathy. Neurologic:  No focal deficit. No seizures. Data: (All radiographs, tracings, PFTs, and imaging are personally viewed and interpreted unless otherwise noted). Sodium 146, potassium 3.7, chloride 99, CO2 33, BUN 94, creatinine 1.8, anion gap 14.0, glucose 83  WBC 18.0, hemoglobin 9.1, hematocrit 30.0, platelet count 615  Telemetry shows NSR   MRI abdomen MRCP 1/26/2022 reports small 2 mm distal common bile duct stone       Meets Continued ICU Level Care Criteria:    [x] Yes   [] No - Transfer Planned to listed location:  [] HOSPITALIST CONTACTED-      Case and plan discussed with Dr. Romy Moncada. Electronically signed by Claryce Hodgkins.  Theodora Conception, APRN - CNP  CRITICAL CARE SPECIALIST  Patient seen by me. Case discussed with nurse practitioner. Patient status post diverting colostomy. Still on mechanical ventilator. Respiratory mechanics are improving. She may be able to extubate in the morning. Continue with meropenem. Patient with diuresis. .  Italicized font represents changes to the note made by me. CC time 35 minutes. Time was discontiguous. Time does not include procedure. Time does include my direct assessment of the patient and coordination of care. Time represents more than 50% of the time involved with patient care by the [address] [place] [address] team.  Electronically signed by Svitlana Licea.  Natalee Steven MD.

## 2022-01-28 NOTE — FLOWSHEET NOTE
01/28/22 1622   Family/Significant Other Communication   Reason Update on patient moving to 4D07   Name Ana Paula Foster   Relationship Sibling   Response Appreciated update, no questions   Method of Communication Phone

## 2022-01-28 NOTE — PROGRESS NOTES
[facility] THERAPY MISSED TREATMENT NOTE  [facility] ICU 4D      Date: 2022  Patient Name: Chetna Gramajo        MRN: [id number]    : 1950  (73 y.o.)    REASON FOR MISSED TREATMENT:  ST attempted to see patient this afternoon for completion of dysphagia tx; however, per discussion with RN Anuel Chiang, patient not appropriate for completion of dysphagia tx at this time. ST to re-attempt at a later date/time as patient is medically appropriate/available.      [facility]) [address]

## 2022-01-28 NOTE — PROGRESS NOTES
POD#1 s/p diverting loop colostomy    Stable po  On vent-no change from preop  Vitals:    01/28/22 0300 01/28/22 0400 01/28/22 0418 01/28/22 0516   BP: (!) 119/59 (!) 91/53 (!) 97/54    Pulse: 81 79 76    Resp: 16 16 16 16   Temp:   99.9 °F (37.7 °C)    TempSrc:   Bladder    SpO2: 97% 95% 95% 96%   Weight:   145 lb 8.1 oz (66 kg)    Height:           Ostomy pink, bag in place  TF restarted  Stable po

## 2022-01-28 NOTE — PROGRESS NOTES
Patient admitted to PACU Room 05 from 4D07 and via cart/stretcher  No complaints upon arrival to the room. IV site free of s/s of infection or infiltration. Vital signs obtained. Assessment and data collection initiated. Oriented to room. Policies and procedures for 4a explained All questions answered with no further questions at this time. Fall prevention and safety brochure discussed with patient. 2 person skin check completed with Dalia SANTACRUZ    Patient declines PCP notification. Patient declines family notification.

## 2022-01-29 LAB
ALLEN TEST: POSITIVE
ANION GAP SERPL CALCULATED.3IONS-SCNC: 14 MEQ/L (ref 8–16)
BASE EXCESS (CALCULATED): 9.6 MMOL/L (ref -2.5–2.5)
BASOPHILS # BLD: 0.9 %
BASOPHILS ABSOLUTE: 0.2 THOU/MM3 (ref 0–0.1)
BUN BLDV-MCNC: 99 MG/DL (ref 7–22)
CALCIUM SERPL-MCNC: 9.4 MG/DL (ref 8.5–10.5)
CHLORIDE BLD-SCNC: 98 MEQ/L (ref 98–111)
CO2: 34 MEQ/L (ref 23–33)
COLLECTED BY:: ABNORMAL
CREAT SERPL-MCNC: 1.8 MG/DL (ref 0.4–1.2)
DEVICE: ABNORMAL
EOSINOPHIL # BLD: 3 %
EOSINOPHILS ABSOLUTE: 0.6 THOU/MM3 (ref 0–0.4)
ERYTHROCYTE [DISTWIDTH] IN BLOOD BY AUTOMATED COUNT: 16.5 % (ref 11.5–14.5)
ERYTHROCYTE [DISTWIDTH] IN BLOOD BY AUTOMATED COUNT: 60.5 FL (ref 35–45)
GFR SERPL CREATININE-BSD FRML MDRD: 28 ML/MIN/1.73M2
GLUCOSE BLD-MCNC: 104 MG/DL (ref 70–108)
HCO3: 35 MMOL/L (ref 23–28)
HCT VFR BLD CALC: 31.9 % (ref 37–47)
HEMOGLOBIN: 9.3 GM/DL (ref 12–16)
IFIO2: 30
IMMATURE GRANS (ABS): 0.21 THOU/MM3 (ref 0–0.07)
IMMATURE GRANULOCYTES: 1.1 %
LYMPHOCYTES # BLD: 7.2 %
LYMPHOCYTES ABSOLUTE: 1.4 THOU/MM3 (ref 1–4.8)
MCH RBC QN AUTO: 29.2 PG (ref 26–33)
MCHC RBC AUTO-ENTMCNC: 29.2 GM/DL (ref 32.2–35.5)
MCV RBC AUTO: 100.3 FL (ref 81–99)
MODE: ABNORMAL
MONOCYTES # BLD: 7.1 %
MONOCYTES ABSOLUTE: 1.4 THOU/MM3 (ref 0.4–1.3)
NUCLEATED RED BLOOD CELLS: 0 /100 WBC
O2 SATURATION: 97 %
PCO2: 52 MMHG (ref 35–45)
PH BLOOD GAS: 7.44 (ref 7.35–7.45)
PLATELET # BLD: 481 THOU/MM3 (ref 130–400)
PMV BLD AUTO: 9.2 FL (ref 9.4–12.4)
PO2: 90 MMHG (ref 71–104)
POTASSIUM SERPL-SCNC: 3.6 MEQ/L (ref 3.5–5.2)
RBC # BLD: 3.18 MILL/MM3 (ref 4.2–5.4)
SEG NEUTROPHILS: 80.7 %
SEGMENTED NEUTROPHILS ABSOLUTE COUNT: 15.8 THOU/MM3 (ref 1.8–7.7)
SODIUM BLD-SCNC: 146 MEQ/L (ref 135–145)
SOURCE, BLOOD GAS: ABNORMAL
WBC # BLD: 19.6 THOU/MM3 (ref 4.8–10.8)

## 2022-01-29 PROCEDURE — 2000000000 HC ICU R&B

## 2022-01-29 PROCEDURE — 6360000002 HC RX W HCPCS: Performed by: NURSE PRACTITIONER

## 2022-01-29 PROCEDURE — 6370000000 HC RX 637 (ALT 250 FOR IP): Performed by: NURSE PRACTITIONER

## 2022-01-29 PROCEDURE — 85025 COMPLETE CBC W/AUTO DIFF WBC: CPT

## 2022-01-29 PROCEDURE — 82803 BLOOD GASES ANY COMBINATION: CPT

## 2022-01-29 PROCEDURE — 6370000000 HC RX 637 (ALT 250 FOR IP): Performed by: STUDENT IN AN ORGANIZED HEALTH CARE EDUCATION/TRAINING PROGRAM

## 2022-01-29 PROCEDURE — 2500000003 HC RX 250 WO HCPCS: Performed by: INTERNAL MEDICINE

## 2022-01-29 PROCEDURE — 94003 VENT MGMT INPAT SUBQ DAY: CPT

## 2022-01-29 PROCEDURE — 2580000003 HC RX 258: Performed by: PHARMACIST

## 2022-01-29 PROCEDURE — 6360000002 HC RX W HCPCS: Performed by: PHARMACIST

## 2022-01-29 PROCEDURE — 2580000003 HC RX 258: Performed by: NURSE PRACTITIONER

## 2022-01-29 PROCEDURE — 94640 AIRWAY INHALATION TREATMENT: CPT

## 2022-01-29 PROCEDURE — 99233 SBSQ HOSP IP/OBS HIGH 50: CPT | Performed by: INTERNAL MEDICINE

## 2022-01-29 PROCEDURE — 80048 BASIC METABOLIC PNL TOTAL CA: CPT

## 2022-01-29 PROCEDURE — 6360000002 HC RX W HCPCS: Performed by: STUDENT IN AN ORGANIZED HEALTH CARE EDUCATION/TRAINING PROGRAM

## 2022-01-29 PROCEDURE — 36600 WITHDRAWAL OF ARTERIAL BLOOD: CPT

## 2022-01-29 RX ADMIN — MIDODRINE HYDROCHLORIDE 15 MG: 5 TABLET ORAL at 06:52

## 2022-01-29 RX ADMIN — FLUOXETINE HYDROCHLORIDE 40 MG: 20 CAPSULE ORAL at 08:28

## 2022-01-29 RX ADMIN — FAMOTIDINE 20 MG: 20 TABLET, FILM COATED ORAL at 08:28

## 2022-01-29 RX ADMIN — ASPIRIN 81 MG CHEWABLE TABLET 81 MG: 81 TABLET CHEWABLE at 08:28

## 2022-01-29 RX ADMIN — ACETAMINOPHEN 650 MG: 325 TABLET ORAL at 13:59

## 2022-01-29 RX ADMIN — BUDESONIDE AND FORMOTEROL FUMARATE DIHYDRATE 1 PUFF: 80; 4.5 AEROSOL RESPIRATORY (INHALATION) at 08:49

## 2022-01-29 RX ADMIN — MICONAZOLE NITRATE: 2 POWDER TOPICAL at 21:44

## 2022-01-29 RX ADMIN — Medication 30 ML: at 08:29

## 2022-01-29 RX ADMIN — ACETAMINOPHEN 650 MG: 325 TABLET ORAL at 05:31

## 2022-01-29 RX ADMIN — CHLOROTHIAZIDE SODIUM 500 MG: 500 INJECTION, POWDER, LYOPHILIZED, FOR SOLUTION INTRAVENOUS at 10:13

## 2022-01-29 RX ADMIN — MIDODRINE HYDROCHLORIDE 15 MG: 5 TABLET ORAL at 21:45

## 2022-01-29 RX ADMIN — CHLOROTHIAZIDE SODIUM 500 MG: 500 INJECTION, POWDER, LYOPHILIZED, FOR SOLUTION INTRAVENOUS at 21:56

## 2022-01-29 RX ADMIN — CHLORHEXIDINE GLUCONATE 0.12% ORAL RINSE 15 ML: 1.2 LIQUID ORAL at 08:28

## 2022-01-29 RX ADMIN — CHLORHEXIDINE GLUCONATE 0.12% ORAL RINSE 15 ML: 1.2 LIQUID ORAL at 19:59

## 2022-01-29 RX ADMIN — BUDESONIDE AND FORMOTEROL FUMARATE DIHYDRATE 1 PUFF: 80; 4.5 AEROSOL RESPIRATORY (INHALATION) at 21:27

## 2022-01-29 RX ADMIN — MICONAZOLE NITRATE: 2 POWDER TOPICAL at 08:29

## 2022-01-29 RX ADMIN — ACETAMINOPHEN 650 MG: 325 TABLET ORAL at 21:44

## 2022-01-29 RX ADMIN — MIDODRINE HYDROCHLORIDE 15 MG: 5 TABLET ORAL at 13:59

## 2022-01-29 RX ADMIN — DAKIN'S SOLUTION 0.125% (QUARTER STRENGTH): 0.12 SOLUTION at 08:29

## 2022-01-29 RX ADMIN — MEROPENEM 500 MG: 500 INJECTION, POWDER, FOR SOLUTION INTRAVENOUS at 11:09

## 2022-01-29 RX ADMIN — ALLOPURINOL 100 MG: 100 TABLET ORAL at 08:28

## 2022-01-29 RX ADMIN — SODIUM CHLORIDE, PRESERVATIVE FREE 10 ML: 5 INJECTION INTRAVENOUS at 08:29

## 2022-01-29 RX ADMIN — Medication 1 CAPSULE: at 08:28

## 2022-01-29 RX ADMIN — EPOETIN ALFA-EPBX 4000 UNITS: 4000 INJECTION, SOLUTION INTRAVENOUS; SUBCUTANEOUS at 08:30

## 2022-01-29 RX ADMIN — SODIUM CHLORIDE, PRESERVATIVE FREE 10 ML: 5 INJECTION INTRAVENOUS at 19:59

## 2022-01-29 ASSESSMENT — PAIN SCALES - GENERAL
PAINLEVEL_OUTOF10: 9
PAINLEVEL_OUTOF10: 0

## 2022-01-29 ASSESSMENT — PULMONARY FUNCTION TESTS
PIF_VALUE: 16
PIF_VALUE: 20
PIF_VALUE: 17
PIF_VALUE: 20

## 2022-01-29 ASSESSMENT — PAIN SCALES - WONG BAKER
WONGBAKER_NUMERICALRESPONSE: 0

## 2022-01-29 NOTE — PROGRESS NOTES
CRITICAL CARE PROGRESS NOTE      Patient:  Mariana Grand Ronde    Unit/Bed:4D-07/007-A  YOB: 1950  MRN: [id number]   PCP: Chani Schmidt MD  Date of Admission: 1/20/2022  Chief Complaint:-Acute respiratory failure     Assessment and Plan:       1. Acute on chronic hypoxic respiratory failure: Patient was intubated 1/24 for airway protection. Maintain peak pressure of 35.  1/29/22. Gen Surgery performed Laparoscopic diverting transverse loop colostomy on 1/27/. Patient is off pressors and on minimum sedative, on vent 16/6, FiO2 30%. Will attempt SBT, off sedation, and possible extubation. 2. Pneumonia: on Merrem, day#6, REsp culture - light growth proteus that is sensitive for Meropenem. PNA panel was positive for Pseudomonas on 1/24. 3. Leukocytosis: WBC- 19.6 , continues low grade fever- this morning 99. 9. wbc trending down compare to past 5 days, Pt is off pressors. 4. Macrocytic anemia: Hb-9.3, stable within past 3 days, no signs of external bleeding. No black stools reported. Patient on weekly Retacrit   5. Chronic kidney injury: Creatinine 1.8, stable. Patient making adequate urine. Monitor. 6. Retroperitoneal hematoma: Hemoglobin is stable. Urology was consulted general surgery was consulted. No plans for surgical intervention. Of note percutaneous nephrostomy tube was changed on 1/12/2022.  7. Dilated common bile duct: Ultrasound gallbladder reported common bile duct of 14 mm. MRCP without contrast.  MRCP reported small stone in bile duct with 8 mm dilation. GI consulted, no plans for intervention and follow up after discharge in outpatient setting  8. Urinary tract infection present on arrival: Patient continues on meropenem day #6. Previous cultures were ESBL producing. 9. Pulmonary hypertension: Patient continues on diuresis. 10. Left obstructive uropathy: Nephrostomy tube in place. This was changed 1/12/2022.   Urology was consulted with no plans for intervention at this time.  11. Stage IV decubitus ulcer: General surgery consulted for diverting colostomy, s/p diverting colostomy 1/27. Wound care following  12. Gout: Allopurinol  13. Sleep apnea: Noted. Patient continues on ventilator. 1/29/22 will stop sedation, SBT, CPAP trial, and possible extubation  14. ANCA associated vasculitis: Will need biopsy when more medically stable. 15. Obesity: Noted, BMI 37.09  16. Mild thrombocytosis. Noted on lab. High likely reactive, platelet count is  Between 455-481 within past 3 days. Monitoring     INITIAL H AND P AND ICU COURSE:  Alex Barnes is a 53-year-old white female who presented to [facility] on 1/20/2022 as a transfer from [facility].  Per report patient has a past medical history of lifetime non-smoker, obstructive sleep apnea, paroxysmal atrial fibrillation, pulmonary artery hypertension, heart failure with preserved ejection fraction, hypertension, gout, depression, osteoarthritis, renal calculi, thrombocytopenia, stage IV decubitus ulcer. Per report patient initially presented to [facility] from October 27 through December 1, 2021 when she was transferred to [facility].  During the hospitalization she suffered from severe pneumonia with pleural effusion. She had a tracheostomy tube placed on 11/17/2020 2021 and was decannulated on 12/23/2021. She also has a stage IV decubitus ulcer with concern for osteomyelitis, ALVIN, ANCA associated vasculitis, hydronephrosis with left-sided staghorn calculi with percutaneous nephrostomy tube. Percutaneous nephrostomy tube was changed on 1/12/2022. Over the last few days in [place] family had noticed her responsiveness and mental status had been declining. She had been complaining of back pain. She became hypoxic and hypercapnic and was transitioned to BiPAP. [facility].   Patient did require intubation on on 1/24/2022 for airway protection. On 1/28 Laparoscopic transverse loop diverting colostomy. Subjective past 24 hrs.  -patient remains on vent, FiO2 30%, minimum sedation overnight. No overnight events reported by RN. -Off pressors. Currently off sedatives. Will do trial SBT, and planning extubate later today. Past Medical History: per HPI  Family History: Mother: Arthritis, COPD Father: Diabetes  Social History: Lifetime non-smoker, denies alcohol drug use     ROS   Sedated on mechanical ventilation     Scheduled Meds:   chlorhexidine  15 mL Mouth/Throat BID    meropenem  500 mg IntraVENous Q12H    lactobacillus  1 capsule Oral Daily with breakfast    budesonide-formoterol  1 puff Inhalation BID    miconazole   Topical BID    multivitamin+  30 mL Oral Daily    midodrine  15 mg Per G Tube Q8H    allopurinol  100 mg Oral Daily    aspirin  81 mg Oral Daily    famotidine  20 mg PEG Tube Daily    FLUoxetine  40 mg Oral Daily    [Held by provider] Riociguat  2.5 mg Oral TID    sodium hypochlorite   Irrigation Daily    ferrous sulfate  325 mg Oral Q MWF    epoetin prince-epbx  4,000 Units SubCUTAneous Weekly    chlorothiazide (DIURIL) IVPB  500 mg IntraVENous Q12H    sodium chloride flush  5-40 mL IntraVENous 2 times per day    [Held by provider] enoxaparin  40 mg SubCUTAneous Daily     Continuous Infusions:   midazolam 1 mg/hr (01/28/22 2043)    sodium chloride 20 mL/hr at 01/27/22 1307    sodium chloride         PHYSICAL EXAMINATION:  T:  99.9.  P:  86. RR:  16. B/P:  113/58. FiO2:  30. O2 Sat:  96.  I/O:  931/5025  Body mass index is 31.49 kg/m². PC: 14/6: TV: 339: RRTotal: 16: Ti:1 sec  General: Acute on chronically ill-appearing white female  HEENT:  normocephalic and atraumatic. No scleral icterus. PERR  Neck: supple. No Thyromegaly. Lungs: clear to auscultation. No retractions  Cardiac: RRR. No JVD. Abdomen: soft. Midline incision, colostomy  Extremities:  No clubbing, cyanosis.   Trace edema  Vasculature: capillary refill < 3 seconds. Palpable dorsalis pedis pulses. Skin:  warm and dry. Psych: Awakes and follows basic commands on mechanical ventilation  Lymph:  No supraclavicular adenopathy. Neurologic:  No focal deficit. No seizures. Data: (All radiographs, tracings, PFTs, and imaging are personally viewed and interpreted unless otherwise noted).  Sodium 146, potassium 3.6, chloride 98, CO2 33, BUN 99, creatinine 1.8, anion gap 14.0, glucose 104   WBC 18.6 hemoglobin 9.13 hematocrit 31.0, platelet count 526   Telemetry shows NSR   · MRI abdomen MRCP 1/26/2022 reports small 2 mm distal common bile duct stone       Meets Continued ICU Level Care Criteria:    [x] Yes   [] No - Transfer Planned to listed location:  [] HOSPITALIST CONTACTED-      Case and plan discussed with Dr. Ignacia Burns.         Electronically signed by Dennis Rodriguez DO PGY-1, Internal Medicine

## 2022-01-29 NOTE — PROGRESS NOTES
Patient has been successfully weaned from Mechanical Ventilation. Patient extubated and placed on 5 liters/min via nasal cannula. Post extubation SpO2 is 97% with HR  80 bpm and RR 20 breaths/min. Patient had moderate cough that was productive of thick sputum. Extubation Well tolerated by patient.

## 2022-01-30 LAB
ANION GAP SERPL CALCULATED.3IONS-SCNC: 13 MEQ/L (ref 8–16)
BASOPHILS # BLD: 0.9 %
BASOPHILS ABSOLUTE: 0.1 THOU/MM3 (ref 0–0.1)
BUN BLDV-MCNC: 88 MG/DL (ref 7–22)
CALCIUM SERPL-MCNC: 7.9 MG/DL (ref 8.5–10.5)
CHLORIDE BLD-SCNC: 106 MEQ/L (ref 98–111)
CO2: 29 MEQ/L (ref 23–33)
CREAT SERPL-MCNC: 1.3 MG/DL (ref 0.4–1.2)
EOSINOPHIL # BLD: 3.7 %
EOSINOPHILS ABSOLUTE: 0.6 THOU/MM3 (ref 0–0.4)
ERYTHROCYTE [DISTWIDTH] IN BLOOD BY AUTOMATED COUNT: 16.2 % (ref 11.5–14.5)
ERYTHROCYTE [DISTWIDTH] IN BLOOD BY AUTOMATED COUNT: 59.2 FL (ref 35–45)
GFR SERPL CREATININE-BSD FRML MDRD: 41 ML/MIN/1.73M2
GLUCOSE BLD-MCNC: 90 MG/DL (ref 70–108)
HCT VFR BLD CALC: 31.5 % (ref 37–47)
HEMOGLOBIN: 9.4 GM/DL (ref 12–16)
IMMATURE GRANS (ABS): 0.16 THOU/MM3 (ref 0–0.07)
IMMATURE GRANULOCYTES: 1 %
LYMPHOCYTES # BLD: 6.5 %
LYMPHOCYTES ABSOLUTE: 1.1 THOU/MM3 (ref 1–4.8)
MCH RBC QN AUTO: 29.6 PG (ref 26–33)
MCHC RBC AUTO-ENTMCNC: 29.8 GM/DL (ref 32.2–35.5)
MCV RBC AUTO: 99.1 FL (ref 81–99)
MONOCYTES # BLD: 7.1 %
MONOCYTES ABSOLUTE: 1.2 THOU/MM3 (ref 0.4–1.3)
NUCLEATED RED BLOOD CELLS: 0 /100 WBC
PLATELET # BLD: 399 THOU/MM3 (ref 130–400)
PMV BLD AUTO: 9.1 FL (ref 9.4–12.4)
POTASSIUM SERPL-SCNC: 2.9 MEQ/L (ref 3.5–5.2)
POTASSIUM SERPL-SCNC: 4.3 MEQ/L (ref 3.5–5.2)
RBC # BLD: 3.18 MILL/MM3 (ref 4.2–5.4)
SEG NEUTROPHILS: 80.8 %
SEGMENTED NEUTROPHILS ABSOLUTE COUNT: 13.3 THOU/MM3 (ref 1.8–7.7)
SODIUM BLD-SCNC: 148 MEQ/L (ref 135–145)
WBC # BLD: 16.4 THOU/MM3 (ref 4.8–10.8)

## 2022-01-30 PROCEDURE — 99233 SBSQ HOSP IP/OBS HIGH 50: CPT | Performed by: INTERNAL MEDICINE

## 2022-01-30 PROCEDURE — 6360000002 HC RX W HCPCS: Performed by: NURSE PRACTITIONER

## 2022-01-30 PROCEDURE — 2580000003 HC RX 258: Performed by: PHARMACIST

## 2022-01-30 PROCEDURE — 85025 COMPLETE CBC W/AUTO DIFF WBC: CPT

## 2022-01-30 PROCEDURE — 6370000000 HC RX 637 (ALT 250 FOR IP): Performed by: NURSE PRACTITIONER

## 2022-01-30 PROCEDURE — 6370000000 HC RX 637 (ALT 250 FOR IP): Performed by: STUDENT IN AN ORGANIZED HEALTH CARE EDUCATION/TRAINING PROGRAM

## 2022-01-30 PROCEDURE — 94640 AIRWAY INHALATION TREATMENT: CPT

## 2022-01-30 PROCEDURE — 80048 BASIC METABOLIC PNL TOTAL CA: CPT

## 2022-01-30 PROCEDURE — 84132 ASSAY OF SERUM POTASSIUM: CPT

## 2022-01-30 PROCEDURE — 6360000002 HC RX W HCPCS: Performed by: PHARMACIST

## 2022-01-30 PROCEDURE — 2700000000 HC OXYGEN THERAPY PER DAY

## 2022-01-30 PROCEDURE — 2580000003 HC RX 258: Performed by: NURSE PRACTITIONER

## 2022-01-30 PROCEDURE — 2060000000 HC ICU INTERMEDIATE R&B

## 2022-01-30 RX ORDER — LANOLIN ALCOHOL/MO/W.PET/CERES
6 CREAM (GRAM) TOPICAL NIGHTLY PRN
Status: DISCONTINUED | OUTPATIENT
Start: 2022-01-30 | End: 2022-02-04 | Stop reason: HOSPADM

## 2022-01-30 RX ADMIN — CHLOROTHIAZIDE SODIUM 500 MG: 500 INJECTION, POWDER, LYOPHILIZED, FOR SOLUTION INTRAVENOUS at 22:36

## 2022-01-30 RX ADMIN — Medication 1 CAPSULE: at 10:51

## 2022-01-30 RX ADMIN — POTASSIUM CHLORIDE 20 MEQ: 400 INJECTION, SOLUTION INTRAVENOUS at 15:00

## 2022-01-30 RX ADMIN — POTASSIUM CHLORIDE 20 MEQ: 400 INJECTION, SOLUTION INTRAVENOUS at 17:46

## 2022-01-30 RX ADMIN — Medication 30 ML: at 10:54

## 2022-01-30 RX ADMIN — MICONAZOLE NITRATE: 2 POWDER TOPICAL at 09:55

## 2022-01-30 RX ADMIN — DAKIN'S SOLUTION 0.125% (QUARTER STRENGTH): 0.12 SOLUTION at 09:55

## 2022-01-30 RX ADMIN — FAMOTIDINE 20 MG: 20 TABLET, FILM COATED ORAL at 10:53

## 2022-01-30 RX ADMIN — ACETAMINOPHEN 650 MG: 325 TABLET ORAL at 03:25

## 2022-01-30 RX ADMIN — CHLORHEXIDINE GLUCONATE 0.12% ORAL RINSE 15 ML: 1.2 LIQUID ORAL at 10:53

## 2022-01-30 RX ADMIN — SODIUM CHLORIDE, PRESERVATIVE FREE 10 ML: 5 INJECTION INTRAVENOUS at 11:34

## 2022-01-30 RX ADMIN — BUDESONIDE AND FORMOTEROL FUMARATE DIHYDRATE 1 PUFF: 80; 4.5 AEROSOL RESPIRATORY (INHALATION) at 16:47

## 2022-01-30 RX ADMIN — MEROPENEM 500 MG: 500 INJECTION, POWDER, FOR SOLUTION INTRAVENOUS at 00:36

## 2022-01-30 RX ADMIN — MEROPENEM 500 MG: 500 INJECTION, POWDER, FOR SOLUTION INTRAVENOUS at 12:56

## 2022-01-30 RX ADMIN — CHLOROTHIAZIDE SODIUM 500 MG: 500 INJECTION, POWDER, LYOPHILIZED, FOR SOLUTION INTRAVENOUS at 12:40

## 2022-01-30 RX ADMIN — FLUOXETINE HYDROCHLORIDE 40 MG: 20 CAPSULE ORAL at 11:03

## 2022-01-30 RX ADMIN — BUDESONIDE AND FORMOTEROL FUMARATE DIHYDRATE 1 PUFF: 80; 4.5 AEROSOL RESPIRATORY (INHALATION) at 09:55

## 2022-01-30 RX ADMIN — ACETAMINOPHEN 650 MG: 325 TABLET ORAL at 11:28

## 2022-01-30 RX ADMIN — ALLOPURINOL 100 MG: 100 TABLET ORAL at 11:02

## 2022-01-30 RX ADMIN — ASPIRIN 81 MG CHEWABLE TABLET 81 MG: 81 TABLET CHEWABLE at 10:52

## 2022-01-30 RX ADMIN — CHLORHEXIDINE GLUCONATE 0.12% ORAL RINSE 15 ML: 1.2 LIQUID ORAL at 20:59

## 2022-01-30 RX ADMIN — POTASSIUM CHLORIDE 20 MEQ: 400 INJECTION, SOLUTION INTRAVENOUS at 13:52

## 2022-01-30 RX ADMIN — SODIUM CHLORIDE, PRESERVATIVE FREE 10 ML: 5 INJECTION INTRAVENOUS at 20:58

## 2022-01-30 RX ADMIN — MICONAZOLE NITRATE: 2 POWDER TOPICAL at 20:58

## 2022-01-30 RX ADMIN — MIDODRINE HYDROCHLORIDE 15 MG: 5 TABLET ORAL at 11:31

## 2022-01-30 ASSESSMENT — PAIN SCALES - GENERAL
PAINLEVEL_OUTOF10: 0
PAINLEVEL_OUTOF10: 5
PAINLEVEL_OUTOF10: 0
PAINLEVEL_OUTOF10: 5

## 2022-01-30 ASSESSMENT — PAIN DESCRIPTION - ONSET: ONSET: ON-GOING

## 2022-01-30 ASSESSMENT — PAIN DESCRIPTION - PAIN TYPE: TYPE: ACUTE PAIN

## 2022-01-30 ASSESSMENT — PAIN DESCRIPTION - LOCATION: LOCATION: BUTTOCKS

## 2022-01-30 ASSESSMENT — PAIN DESCRIPTION - DESCRIPTORS: DESCRIPTORS: BURNING;ACHING

## 2022-01-30 ASSESSMENT — PAIN DESCRIPTION - FREQUENCY: FREQUENCY: INTERMITTENT

## 2022-01-30 ASSESSMENT — PAIN DESCRIPTION - ORIENTATION: ORIENTATION: MID

## 2022-01-30 NOTE — PROGRESS NOTES
Hospitalist Progress Note      Patient:  [facility]    Unit/Bed:4-27/027-A  YOB: 1950  MRN: [id number]   Acct: [de-identified]   PCP: Sherrel Frankel, MD  Date of Admission: 1/20/2022    Assessment/Plan:    #Acute on chronic hypoxic respiratory failure: Intubated 1/24/2022 due to concerns patient was not protecting airway. Laparoscopy diverting transverse loop colostomy performed 1/27/2022. Patient weaned off pressors and was on minimum vent sedation/settings. Patient appropriately extubated, weaned down to 1 L nasal cannula. -Continue monitor for signs/symptoms of respiratory distress  -Wean oxygen as tolerated  #Community-acquired pneumonia: Pneumonia panel positive for Proteus and Pseudomonas. Culture growing Candida, likely contaminant. Initiated on Zosyn for 2 days initially, transitioned over to meropenem. Sensitivities indicate appropriate antibiotic usage. Currently on Merrem day 7  -Continue meropenem 500 mg twice daily  -Plan for possible 10-day antibiotic course pending patient clinical improvement  #UTI: Present on arrival.  Previous culture ESBL producing.  -Continue with meropenem per above  #Pulmonary hypertension: History of.  -Continue diuresis with Diuril  #Stage IV decubitus ulcer: General surgery consulted. Patient underwent diverting colostomy 1/27/2022. 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 Uro consulted, no further intervention  - I's and O's  #CKD: Cr stable.  Urine output adequate  -Is/Os  -Will continue to monitor  #Macrocytic anemia: Hgb stable  - Continue weekly retacrit  #Gout: History of.  -Continue allopurinol  #Dilated CBD: US gallbladder demonstrated CBD dilation of 14mm. MRCP with contrast reported small stone in bile duct with 8mm dilatation. GI consulted, no plans for acute intervention  #BARBER: History of. Patient off of ventilator and tolerating nasal cannula  - Follow-up outpatient  #Obesity: BMI 31.49      Chief Complaint: Shortness of breath    Initial H and P:-    Per HPI: aJsson Fofana is a 24-year-old  female transferred to [facility] ICU on 1/20/2022 with acute respiratory failure, hypoxia. Patient is currently on a BiPAP.     Patient has past medical history significant for everyday smoker, BARBER, PAF s/p cardioversion 11/3/2021, PAH grp 3, HFpEF-ECHO 10/2021 LV 60 to 65% with trace tricuspid regurg. RV with normal function, essential hypertension, depression, gout, osteoarthritis, renal calculi, thrombocytopenia, stage 3 sacral ulcer s/p wound ostomy. Recent [facility] hospitalization 10/27-12/1/2021 patient suffered a very lengthy hospitalization secondary to acute hypoxic and hypercapnic respiratory failure felt to be secondary to severe pneumonia with left-sided pleural effusion and repeated mucous plugging. Patient did require tracheostomy on 11/17/2021, and de cannulated 12/23/2021. Stage IV decubitus ulcer with suspected osteomyelitis, ALVIN likely ATN, ANCA associated Vasculitis, hydronephrosis secondary to left-sided staghorn calculi percutaneous tube was placed on 11/12/2021, bilateral pleural effusions felt to be transudative status post thoracentesis of 1 L on 12/1/2021. Transferred to [address] 12/1/2021.      Per family noted decreased responsiveness and change in mental status over the last 2 days with vague complaint of back and arm pain. An EKG was completed on 1/19/2022 with no acute ST changes. And troponin evaluation revealed 0.176 and 0.155. Patient became hypoxic and hypercapnic and was transitioned to BiPAP therapy.   1/20/2022 Dr. Zoran Jose was concern with sepsis and requested patient to be transferred to the ICU for further management and care.\"      Subjective (past 24 hours):   Patient seen and examined this AM. C/o continued malaise, fatigue. Patient endorses continued physical deterioration. Denies fever, chills, CP, SOB. Transferred out of ICU this morning. Past medical history, family history, social history and allergies reviewed again and is unchanged since admission. ROS (12 point review of systems completed. Pertinent positives noted.  Otherwise ROS is negative)     Medications:  Reviewed    Infusion Medications    sodium chloride       Scheduled Medications    chlorhexidine  15 mL Mouth/Throat BID    meropenem  500 mg IntraVENous Q12H    lactobacillus  1 capsule Oral Daily with breakfast    budesonide-formoterol  1 puff Inhalation BID    miconazole   Topical BID    multivitamin+  30 mL Oral Daily    midodrine  15 mg Per G Tube Q8H    allopurinol  100 mg Oral Daily    aspirin  81 mg Oral Daily    famotidine  20 mg PEG Tube Daily    FLUoxetine  40 mg Oral Daily    [Held by provider] Riociguat  2.5 mg Oral TID    sodium hypochlorite   Irrigation Daily    ferrous sulfate  325 mg Oral Q MWF    epoetin prince-epbx  4,000 Units SubCUTAneous Weekly    chlorothiazide (DIURIL) IVPB  500 mg IntraVENous Q12H    sodium chloride flush  5-40 mL IntraVENous 2 times per day    [Held by provider] enoxaparin  40 mg SubCUTAneous Daily     PRN Meds: melatonin, docusate, hydrOXYzine, senna, potassium chloride, magnesium sulfate, sodium chloride, sodium chloride flush, ondansetron **OR** ondansetron, polyethylene glycol, acetaminophen **OR** acetaminophen, sodium chloride flush      Intake/Output Summary (Last 24 hours) at 1/30/2022 1305  Last data filed at 1/29/2022 2238  Gross per 24 hour   Intake 858.73 ml   Output 905 ml   Net -46.27 ml       Diet:  Diet NPO  ADULT TUBE FEEDING; PEG; Renal Formula; Continuous; 30; No; 30; Q 8 hours    Exam:    /62   Pulse 88   Temp 98.7 °F (37.1 °C) (Oral)   Resp 16   Ht 4' 9\" (1.448 m) Wt 145 lb 8.1 oz (66 kg)   SpO2 97%   BMI 31.49 kg/m²     General appearance: Chronically ill-appearing female  HEENT: Pupils equal, round, and reactive to light. Conjunctivae/corneas clear. Neck: Supple, with full range of motion. No jugular venous distention. Trachea midline. Respiratory:  Normal respiratory effort. Clear to auscultation, bilaterally without Rales/Wheezes/Rhonchi. Cardiovascular: Regular rate and rhythm with normal S1/S2 without murmurs, rubs or gallops. Abdomen: Soft, colostomy present, NT  Musculoskeletal: passive and active ROM x 4 extremities. Skin: Skin color, texture, turgor normal.  No rashes or lesions. Neurologic:  Neurovascularly intact without any focal sensory/motor deficits. Cranial nerves: II-XII intact, grossly non-focal.  Psychiatric: Alert and oriented, thought content appropriate, normal insight  Capillary Refill: Brisk,< 3 seconds   Peripheral Pulses: +2 palpable, equal bilaterally     Labs:   Recent Labs     01/28/22  0451 01/29/22  0500 01/30/22  0945   WBC 18.0* 19.6* 16.4*   HGB 9.1* 9.3* 9.4*   HCT 30.0* 31.9* 31.5*   * 481* 399     Recent Labs     01/28/22  0451 01/29/22  0500 01/30/22  0945   * 146* 148*   K 3.7 3.6 2.9*   CL 99 98 106   CO2 33 34* 29   BUN 94* 99* 88*   CREATININE 1.8* 1.8* 1.3*   CALCIUM 9.2 9.4 7.9*     No results for input(s): AST, ALT, BILIDIR, BILITOT, ALKPHOS in the last 72 hours. No results for input(s): INR in the last 72 hours. No results for input(s): Ethelene Soulier in the last 72 hours. Microbiology:    Blood culture #1:   Lab Results   Component Value Date    BC No growth-preliminary No growth  01/21/2022       Blood culture #2:No results found for: Sean Wilson    Organism:  Lab Results   Component Value Date    ORG Candida albicans 01/24/2022         Lab Results   Component Value Date    LABGRAM  01/24/2022     Quality of sputum specimen: Specimen acceptable. Moderate segmented neutrophils observed.  Rare epithelial cells observed. No bacteria seen. MRSA culture only:No results found for: [facility]    Urine culture:   Lab Results   Component Value Date    LABURIN No growth-preliminary  01/20/2022    LABURIN [place] count: 1,000 CFU/mL  01/20/2022       Respiratory culture: No results found for: CULTRESP    Aerobic and Anaerobic :  Lab Results   Component Value Date    LABAERO  11/19/2021     Culture also yielded very light growth of Staphylococcus species (coagulase negative), and additional enteric gram negative bacilli. At least one of drugs in current antibiotic therapy is ineffective in vitro for isolate. LABAERO moderate growth  11/19/2021    LABAERO light growth  11/19/2021     Lab Results   Component Value Date    LABANAE No growth-preliminary No growth  12/01/2021       Urinalysis:      Lab Results   Component Value Date    NITRU NEGATIVE 01/20/2022    WBCUA 50-75 01/20/2022    BACTERIA NONE SEEN 01/20/2022    RBCUA  01/20/2022    BLOODU LARGE 01/20/2022    SPECGRAV 1.014 01/20/2022    GLUCOSEU NEGATIVE 01/19/2022       Radiology:  MRI ABDOMEN WO CONTRAST MRCP   Final Result   1. Small 2 mm distal common bile duct stone with mild dilatation of the    common bile duct at 8 mm (upper normal 7 mm for patient's age). No    intrahepatic biliary dilatation. 2. Cholelithiasis without gallbladder wall edema. 3. 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   4. Left subcapsular renal hematoma measuring 2.2 cm in width with    extension to the left posterior paranephric spaces is seen on recent CT. Underlying left renal stones. 5. Benign right renal cysts. 6. Partially seen bilateral pleural effusions. Mild upper abdominal    ascites. This document has been electronically signed by: Nancy Grimm MD on    01/26/2022 08:33 PM      CT HEAD WO CONTRAST   Final Result   1. No acute intracranial process.    2. Subcortical/periventricular white matter hypoattenuation statistically    representing changes of chronic microvascular ischemia. Global parenchymal    volume loss which is commensurate with reported age. This document has been electronically signed by: Cinthia العراقي DO on    01/24/2022 05:49 AM      All CTs at this facility use dose modulation techniques and iterative    reconstructions, and/or weight-based dosing   when appropriate to reduce radiation to a low as reasonably achievable. XR CHEST PORTABLE   Final Result   ET tube 2.5 cm above the avelina. This document has been electronically signed by: Kandace Cardenas MD on 01/24/2022 04:51 AM      XR CHEST PORTABLE   Final Result   Improving heart failure. This document has been electronically signed by: Kandace Cardenas MD on 01/24/2022 12:38 AM      XR CHEST PORTABLE   Final Result   1. Stable cardiomegaly with pulmonary vascular congestion suspicious for congestive heart failure. 2. Small left-sided pleural effusion. 3. Bilateral atelectasis/infiltrate. **This report has been created using voice recognition software. It may contain minor errors which are inherent in voice recognition technology. **      Final report electronically signed by Dr. Kristal Polo on 1/23/2022 10:39 AM      XR CHEST PORTABLE   Final Result   Impression:   Cardiomegaly with pulmonary vascular congestion and bilateral pleural    effusions, similar to prior study. This document has been electronically signed by: Radha Jimenes MD on    01/22/2022 04:39 AM      VL DUP LOWER EXTREMITY VENOUS BILATERAL   Final Result   No evidence of a DVT. **This report has been created using voice recognition software. It may contain minor errors which are inherent in voice recognition technology. **      Final report electronically signed by Dr Paloma Howell on 1/21/2022 1:43 PM      US GALLBLADDER RUQ   Final Result   Impression:      Cholelithiasis considerable ductal dilation      This document has been electronically signed by: Yris Casanova MD on    01/21/2022 01:43 AM      CT CHEST WO CONTRAST   Final Result   Impression:      Bilateral pleural effusions and atelectasis with cardiomegaly. Question pulmonary edema. This document has been electronically signed by: Yris Casanova MD on    01/20/2022 11:23 PM      All CTs at this facility use dose modulation techniques and iterative    reconstructions, and/or weight-based dosing   when appropriate to reduce radiation to a low as reasonably achievable. CT ABDOMEN PELVIS WO CONTRAST Additional Contrast? None   Final Result   Impression:      Left perinephric and renal subcapsular hematomas      Loculated left retroperitoneal fluid, question hematoma versus abscess. Anasarca pattern noted with body wall edema      This document has been electronically signed by: Yris Casanova MD on    01/20/2022 11:29 PM      All CTs at this facility use dose modulation techniques and iterative    reconstructions, and/or weight-based dosing   when appropriate to reduce radiation to a low as reasonably achievable. XR CHEST PORTABLE   Final Result   Impression:   1. Lines and tubes as above. 2. Changes of the pulmonary parenchyma concerning for mild bilateral    pneumonia.       This document has been electronically signed by: Michael Chowdary MD on    01/20/2022 09:40 PM        ECHO Limited    Result Date: 1/21/2022  Transthoracic Echocardiography Report (TTE)  Demographics   Patient Name   Dafne Bhardwaj Gender              Female   MR #           [id number]     Race                                                Ethnicity   Account #      [de-identified]     Room Number         0007   Accession      [id number]    Date of Study       01/21/2022  Number   Date of Birth  1950    Referring Physician Gallo Cohen CNP Ramon Marr MD   Age            73 year(s)    Sonographer         Kaity Garza RDCS                                Interpreting        Echo reader of the week                               Physician           Miguel Angel Mclain MD  Procedure Type of Study   TTE procedure:ECHOCARDIOGRAM LIMITED. Procedure Date Date: 01/21/2022 Start: 08:30 AM Study Location: Bedside Technical Quality: Adequate visualization Indications:Hypoxia. Additional Medical History:Osteoarthritis, Hypertension, Acute kidney injury, Pulmonary hypertension Patient Status: Routine Height: 57 inches Weight: 178 pounds BSA: 1.71 m^2 BMI: 38.52 kg/m^2 BP: 104/57 mmHg Allergies   - No Known Allergies.   - No Known Allergies. Conclusions   Summary  Normal left ventricle size and systolic function. Ejection fraction was  estimated at 60 %. There were no regional left ventricular wall motion  abnormalities and wall thickness was within normal limits. Signature   ----------------------------------------------------------------  Electronically signed by Miguel Angel Mclain MD (Interpreting  physician) on 01/21/2022 at 04:53 PM  ----------------------------------------------------------------   Findings   Mitral Valve  The mitral valve structure was normal with normal leaflet separation. Aortic Valve  The aortic valve was trileaflet with normal thickness and cuspal  separation. Tricuspid Valve  The tricuspid valve structure was normal with normal leaflet separation   Pulmonic Valve  The pulmonic valve leaflets exhibited normal thickness, no calcification,  and normal cuspal separation. Left Atrium  The left atrium is Mild to moderate dilated. Left Ventricle  Normal left ventricle size and systolic function. Ejection fraction was  estimated at 60 %. There were no regional left ventricular wall motion  abnormalities and wall thickness was within normal limits.    Right Atrium  Right atrial size was normal. Right Ventricle  The right ventricular size was normal with normal systolic function and  wall thickness. Pericardial Effusion  The pericardium was normal in appearance with no evidence of a pericardial  effusion. Pleural Effusion  No evidence of pleural effusion. Aorta / Great Vessels  -Aortic root dimension within normal limits.  -The Pulmonary artery is within normal limits. -IVC size is within normal limits with normal respiratory phasic changes. M-Mode/2D Measurements & Calculations   LV Diastolic   LV Systolic Dimension:    AV Cusp Separation: 1 cmLA  Dimension: 5   3.6 cm                    Dimension: 3.3 cmAO Root  cm             LV Volume Diastolic: 558  Dimension: 3.1 cmLA Area: 25.8  LV FS:28 %     ml                        cm^2  LV PW          LV Volume Systolic: 20.4  Diastolic: 0.9 ml  cm             LV EDV/LV EDV Index: 118  Septum         ml/69 m^2LV ESV/LV ESV    RV Diastolic Dimension: 2.5 cm  Diastolic: 0.7 Index: 61.2 ml/32 m^2  cm             EF Calculated: 53.9 %     LA/Aorta: 1.06                                            LA volume/Index: 91.8 ml /54m^2                  LVOT: 1.8 cm  [email or web address] DocKey=[id number]    CT ABDOMEN PELVIS WO CONTRAST Additional Contrast? None    Result Date: 1/20/2022  CT abdomen and pelvis without contrast Comparison: None Findings: Degenerative change lumbar spine and hips. No acute bony abnormalities. Gastrostomy tube balloon mid stomach. Padilla catheter noted urinary bladder. Liver and spleen are unremarkable. Cholelithiasis. Pancreas and adrenal glands are within normal limits. Multiple left renal stones with left nephrostomy catheter. Subcapsular hematoma left kidney, not well assessed without contrast. Retroperitoneal hematoma also noted along with perinephric blood. Posterior left perinephric fluid is somewhat loculated.  Abscess cannot be excluded. Left kidney displaced anteriorly. Right kidney unremarkable without stones or hydronephrosis. No evidence for aortic aneurysm. No free fluid or adenopathy is noted in the pelvis. No evidence for diverticulitis. Appendix not identified. Question partial hysterectomy. No adnexal abnormalities. Subcutaneous edema consistent with anasarca. Impression: Left perinephric and renal subcapsular hematomas Loculated left retroperitoneal fluid, question hematoma versus abscess. Anasarca pattern noted with body wall edema This document has been electronically signed by: Carmen Putnam MD on 01/20/2022 11:29 PM All CTs at this facility use dose modulation techniques and iterative reconstructions, and/or weight-based dosing when appropriate to reduce radiation to a low as reasonably achievable. CT CHEST WO CONTRAST    Result Date: 1/20/2022  CT chest without contrast Comparison: None Findings: Cardiomegaly with bilateral pleural effusions. Bilateral lower lobe atelectasis noted. Pulmonary edema is possible. No mediastinal or hilar adenopathy. Dense coronary artery calcifications. Gastrostomy tube noted in mid stomach. Heterogeneous enlarged thyroid, consider follow-up ultrasound. No significant focal bony abnormalities. Impression: Bilateral pleural effusions and atelectasis with cardiomegaly. Question pulmonary edema. This document has been electronically signed by: Carmen Putnam MD on 01/20/2022 11:23 PM All CTs at this facility use dose modulation techniques and iterative reconstructions, and/or weight-based dosing when appropriate to reduce radiation to a low as reasonably achievable. IR FLUORO GUIDED CVA DEVICE PLMT/REPLACE/REMOVAL    Result Date: 1/20/2022  PICC LINE INSERTION WITH ULTRASOUND AND FLUOROSCOPIC GUIDANCE: CLINICAL INFORMATION: 68-year-old female with renal failure, respiratory failure, sepsis. Poor intravenous access. PERFORMED BY: Mana Grimaldo M.D.  APPROACH:  Right Internal Jugular Vein, ultrasound guidance, micropuncture technique. CATHETER: 6 Western Suzi triple lumen power PICC CATHETER LENGTH: 15 cm CATHETER TIP:  Right Atrium FLUOROSCOPY TIME: 1 minute 9 seconds FLUOROSCOPIC IMAGES: 1 PROCEDURE:  Signed informed consent was obtained prior to performing this procedure. The patient was not sedated during this procedure. Following local anesthesia and utilizing MAXIMAL STERILE BARRIER TECHNIQUE, the vein listed above was punctured with a micropuncture needle, utilizing ultrasound guidance, and a sonographic image was obtained for confirmation of needle position and vessel patency. .  A .018 wire was passed through this needle, followed by insertion of a 4 Western Suzi dilator. A peel-away sheath of the appropriate size was then advanced over the 018 wire. The PICC line was cut to the appropriate length and then advanced through the peel-away sheath, and the sheath was removed. This was all performed with fluoroscopic guidance. A spot film of the chest was obtained to document appropriate catheter position. The lumens of the catheter were flushed with saline, and positive pressure caps were applied. The hub of the catheter was then stabilized to the skin with 3-0 silk sutures and a sterile op-site dressing was applied. Status post successful PICC line insertion. **This report has been created using voice recognition software. It may contain minor errors which are inherent in voice recognition technology. ** Final report electronically signed by Dr Silva Vaughan on 1/20/2022 5:30 PM    US GALLBLADDER RUQ    Result Date: 1/21/2022  US abdomen limited Comparison: None Findings: Study was limited by abdominal bandages. Liver normal size and echotexture. Right lobe 18.3 cm length. Pancreas partially obscured, no definite abnormality. Cholelithiasis entire gallbladder lumen without wall thickening. Common duct 14 mm diameter. No ultrasonographic Joseph sign.      Impression: tibial veins and peroneal veins. No evidence of a DVT. **This report has been created using voice recognition software. It may contain minor errors which are inherent in voice recognition technology. ** Final report electronically signed by Dr Tonya Noonan on 1/21/2022 1:43 PM      Electronically signed by Josselin Linda MD on 1/30/2022 at 1:05 PM

## 2022-01-31 LAB
ANION GAP SERPL CALCULATED.3IONS-SCNC: 13 MEQ/L (ref 8–16)
BASOPHILS # BLD: 0.8 %
BASOPHILS ABSOLUTE: 0.1 THOU/MM3 (ref 0–0.1)
BUN BLDV-MCNC: 103 MG/DL (ref 7–22)
CALCIUM SERPL-MCNC: 9.2 MG/DL (ref 8.5–10.5)
CHLORIDE BLD-SCNC: 105 MEQ/L (ref 98–111)
CO2: 32 MEQ/L (ref 23–33)
CREAT SERPL-MCNC: 1.6 MG/DL (ref 0.4–1.2)
EOSINOPHIL # BLD: 2.1 %
EOSINOPHILS ABSOLUTE: 0.3 THOU/MM3 (ref 0–0.4)
ERYTHROCYTE [DISTWIDTH] IN BLOOD BY AUTOMATED COUNT: 16.2 % (ref 11.5–14.5)
ERYTHROCYTE [DISTWIDTH] IN BLOOD BY AUTOMATED COUNT: 60.3 FL (ref 35–45)
GFR SERPL CREATININE-BSD FRML MDRD: 32 ML/MIN/1.73M2
GLUCOSE BLD-MCNC: 125 MG/DL (ref 70–108)
HCT VFR BLD CALC: 33.5 % (ref 37–47)
HEMOGLOBIN: 9.4 GM/DL (ref 12–16)
HYPOCHROMIA: PRESENT
IMMATURE GRANS (ABS): 0.15 THOU/MM3 (ref 0–0.07)
IMMATURE GRANULOCYTES: 0.9 %
LYMPHOCYTES # BLD: 7.9 %
LYMPHOCYTES ABSOLUTE: 1.3 THOU/MM3 (ref 1–4.8)
MCH RBC QN AUTO: 28.5 PG (ref 26–33)
MCHC RBC AUTO-ENTMCNC: 28.1 GM/DL (ref 32.2–35.5)
MCV RBC AUTO: 101.5 FL (ref 81–99)
MONOCYTES # BLD: 6.8 %
MONOCYTES ABSOLUTE: 1.1 THOU/MM3 (ref 0.4–1.3)
NUCLEATED RED BLOOD CELLS: 0 /100 WBC
OSMOLALITY URINE: 420 MOSMOL/KG (ref 250–750)
PLATELET # BLD: 438 THOU/MM3 (ref 130–400)
PMV BLD AUTO: 9.3 FL (ref 9.4–12.4)
POTASSIUM SERPL-SCNC: 4.4 MEQ/L (ref 3.5–5.2)
PROCALCITONIN: 0.7 NG/ML (ref 0.01–0.09)
RBC # BLD: 3.3 MILL/MM3 (ref 4.2–5.4)
SEG NEUTROPHILS: 81.5 %
SEGMENTED NEUTROPHILS ABSOLUTE COUNT: 13 THOU/MM3 (ref 1.8–7.7)
SODIUM BLD-SCNC: 150 MEQ/L (ref 135–145)
SODIUM BLD-SCNC: 153 MEQ/L (ref 135–145)
SODIUM URINE: 69 MEQ/L
WBC # BLD: 15.9 THOU/MM3 (ref 4.8–10.8)

## 2022-01-31 PROCEDURE — 2060000000 HC ICU INTERMEDIATE R&B

## 2022-01-31 PROCEDURE — 97167 OT EVAL HIGH COMPLEX 60 MIN: CPT

## 2022-01-31 PROCEDURE — 6370000000 HC RX 637 (ALT 250 FOR IP): Performed by: NURSE PRACTITIONER

## 2022-01-31 PROCEDURE — 80048 BASIC METABOLIC PNL TOTAL CA: CPT

## 2022-01-31 PROCEDURE — 83935 ASSAY OF URINE OSMOLALITY: CPT

## 2022-01-31 PROCEDURE — 85025 COMPLETE CBC W/AUTO DIFF WBC: CPT

## 2022-01-31 PROCEDURE — 36415 COLL VENOUS BLD VENIPUNCTURE: CPT

## 2022-01-31 PROCEDURE — 97162 PT EVAL MOD COMPLEX 30 MIN: CPT

## 2022-01-31 PROCEDURE — 94760 N-INVAS EAR/PLS OXIMETRY 1: CPT

## 2022-01-31 PROCEDURE — 99233 SBSQ HOSP IP/OBS HIGH 50: CPT | Performed by: INTERNAL MEDICINE

## 2022-01-31 PROCEDURE — 84145 PROCALCITONIN (PCT): CPT

## 2022-01-31 PROCEDURE — 97530 THERAPEUTIC ACTIVITIES: CPT

## 2022-01-31 PROCEDURE — 6370000000 HC RX 637 (ALT 250 FOR IP): Performed by: STUDENT IN AN ORGANIZED HEALTH CARE EDUCATION/TRAINING PROGRAM

## 2022-01-31 PROCEDURE — 92610 EVALUATE SWALLOWING FUNCTION: CPT

## 2022-01-31 PROCEDURE — 2580000003 HC RX 258: Performed by: PHARMACIST

## 2022-01-31 PROCEDURE — 94640 AIRWAY INHALATION TREATMENT: CPT

## 2022-01-31 PROCEDURE — 99024 POSTOP FOLLOW-UP VISIT: CPT | Performed by: SURGERY

## 2022-01-31 PROCEDURE — 6360000002 HC RX W HCPCS: Performed by: PHARMACIST

## 2022-01-31 PROCEDURE — 84295 ASSAY OF SERUM SODIUM: CPT

## 2022-01-31 PROCEDURE — 84300 ASSAY OF URINE SODIUM: CPT

## 2022-01-31 PROCEDURE — 97110 THERAPEUTIC EXERCISES: CPT

## 2022-01-31 PROCEDURE — 2580000003 HC RX 258: Performed by: NURSE PRACTITIONER

## 2022-01-31 RX ADMIN — CHLORHEXIDINE GLUCONATE 0.12% ORAL RINSE 15 ML: 1.2 LIQUID ORAL at 21:20

## 2022-01-31 RX ADMIN — MEROPENEM 500 MG: 500 INJECTION, POWDER, FOR SOLUTION INTRAVENOUS at 01:47

## 2022-01-31 RX ADMIN — MICONAZOLE NITRATE: 2 POWDER TOPICAL at 09:30

## 2022-01-31 RX ADMIN — FERROUS SULFATE TAB 325 MG (65 MG ELEMENTAL FE) 325 MG: 325 (65 FE) TAB at 17:09

## 2022-01-31 RX ADMIN — SODIUM CHLORIDE, PRESERVATIVE FREE 10 ML: 5 INJECTION INTRAVENOUS at 09:29

## 2022-01-31 RX ADMIN — Medication 1 CAPSULE: at 09:29

## 2022-01-31 RX ADMIN — MIDODRINE HYDROCHLORIDE 15 MG: 5 TABLET ORAL at 14:23

## 2022-01-31 RX ADMIN — FLUOXETINE HYDROCHLORIDE 40 MG: 20 CAPSULE ORAL at 09:29

## 2022-01-31 RX ADMIN — BUDESONIDE AND FORMOTEROL FUMARATE DIHYDRATE 1 PUFF: 80; 4.5 AEROSOL RESPIRATORY (INHALATION) at 07:47

## 2022-01-31 RX ADMIN — BUDESONIDE AND FORMOTEROL FUMARATE DIHYDRATE 1 PUFF: 80; 4.5 AEROSOL RESPIRATORY (INHALATION) at 17:38

## 2022-01-31 RX ADMIN — MIDODRINE HYDROCHLORIDE 15 MG: 5 TABLET ORAL at 21:19

## 2022-01-31 RX ADMIN — MEROPENEM 500 MG: 500 INJECTION, POWDER, FOR SOLUTION INTRAVENOUS at 23:16

## 2022-01-31 RX ADMIN — SODIUM CHLORIDE, PRESERVATIVE FREE 10 ML: 5 INJECTION INTRAVENOUS at 21:18

## 2022-01-31 RX ADMIN — ASPIRIN 81 MG CHEWABLE TABLET 81 MG: 81 TABLET CHEWABLE at 09:29

## 2022-01-31 RX ADMIN — FAMOTIDINE 20 MG: 20 TABLET, FILM COATED ORAL at 09:29

## 2022-01-31 RX ADMIN — MICONAZOLE NITRATE: 2 POWDER TOPICAL at 21:18

## 2022-01-31 RX ADMIN — CHLORHEXIDINE GLUCONATE 0.12% ORAL RINSE 15 ML: 1.2 LIQUID ORAL at 09:29

## 2022-01-31 RX ADMIN — ALLOPURINOL 100 MG: 100 TABLET ORAL at 09:29

## 2022-01-31 RX ADMIN — Medication 30 ML: at 09:29

## 2022-01-31 RX ADMIN — ACETAMINOPHEN 650 MG: 325 TABLET ORAL at 01:57

## 2022-01-31 RX ADMIN — MEROPENEM 500 MG: 500 INJECTION, POWDER, FOR SOLUTION INTRAVENOUS at 11:06

## 2022-01-31 ASSESSMENT — PAIN SCALES - GENERAL
PAINLEVEL_OUTOF10: 10
PAINLEVEL_OUTOF10: 0

## 2022-01-31 NOTE — PROGRESS NOTES
[address] [facility] PHYSICAL THERAPY  EVALUATION  [facility] ICU STEPDOWN TELEMETRY 4K - 2D-59/400-C    Time In: 0662  Time Out: 1352  Timed Code Treatment Minutes: 40 Minutes  Minutes: 50          Date: 2022  Patient Name: Tl Don,  Gender:  female        MRN: [id number]  : 1950  (73 y.o.)      Referring Practitioner: Koko Deras MD  Diagnosis: Acute on chronic respiratory failure with hypoxia  Additional Pertinent Hx: Admitted  Due to hypoxic respiratory failure on 22 from [place]. She had a prolonged hospitalization on on 10/27/21 to 21 after she was admitted due to respiratory failure. ahd a prolonged ICU stay. Had severe pneumonia with effusion required tracheostomy on 21 and decannulated on 21. 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 She was subsequently transferred to [place] on 21 and was back on 22 due to respiratory failure. She had  Loop colostomy for diversion. I was asked to see patient for the non healing coccyx wound. Pt is s/p colostomy . Restrictions/Precautions:  Restrictions/Precautions: General Precautions,Fall Risk  Position Activity Restriction  Other position/activity restrictions: PEG tube, colostomy, sacral wound    Subjective:  Chart Reviewed: Yes  Patient assessed for rehabilitation services?: Yes  Family / Caregiver Present: Yes (sister)  Subjective: RN approved session, pt is supine in bed, sister and brother present. Pt agreeable to PT, on 1 L O2.     General:  Overall Orientation Status: Impaired  Orientation Level: Oriented to place,Oriented to person    Vision: Within Functional Limits    Hearing: Within functional limits       Pain: denies    Vitals: Vitals not assessed per clinical judgement, see nursing flowsheet    Social/Functional History:    Lives With: Other (comment)  Type of Home: House  Home Layout: One level  Home Access: Level entry  Home Equipment: Rolling walker,Wheelchair-manual     Bathroom Shower/Tub: Tub/Shower unit  Bathroom Equipment: Grab bars in [facility] chair       ADL Assistance: [facility]: Needs assistance  Ambulation Assistance: Independent  Transfer Assistance: Independent    Active : Yes (prior to hospitaization)     Additional Comments: Brother reported pt has been using RW since 08/21 when had surgery/wound vac for sacral decubitus. Brother reported prior to surgery pt was walking 3,000 steps/day, although since surgery does minimal activity. At [address] [employer], pt transferred to chair with [facility] lift, PT/OT had DC'd ~2 weeks prior to admission    OBJECTIVE:  Range of Motion:  Bilateral Lower Extremity: Impaired - B hip and knee flexion to ~90 degrees, c/o pain, heel cords tight, at least 25 degrees from neutral    Strength:  Bilateral Lower Extremity: Impaired - max A for R LE therex, mod-max for L LE therex    Balance:  Static Sitting Balance:  Stand By Assistance  Dynamic Sitting Balance: Contact Guard Assistance (within ERNA, pt apprehensive to reach)  **Pt sits EOB x 15 minutes, min A initially due to posterior lean but progress to SBA within a min    Bed Mobility:  Rolling to Left: Maximum Assistance   Supine to Sit: Maximum Assistance, X 2  Sit to Supine: Maximum Assistance, X 1   Scooting: Maximum Assistance, X 1    Exercise:  Patient was guided in 1 set(s) 10 reps of exercise to both lower extremities. Ankle pumps, Heelslides and Hip abduction/adduction. Exercises were completed for increased independence with functional mobility. Max A for R LE, mod-max A for L LE, B heel cord stretching 3 x 30 seconds each. Functional Outcome Measures: Completed  AM-PAC Inpatient Mobility Raw Score : 7  AM-PAC Inpatient T-Scale Score : 26.42    ASSESSMENT:  Activity Tolerance:  Patient tolerance of  treatment: good.        Treatment Initiated: Treatment and education initiated within context of evaluation. Evaluation time included review of current medical information, gathering information related to past medical, social and functional history, completion of standardized testing, formal and informal observation of tasks, assessment of data and development of plan of care and goals. Treatment time included skilled education and facilitation of tasks to increase safety and independence with functional mobility for improved independence and quality of life. Assessment: Body structures, Functions, Activity limitations: Decreased functional mobility ,Decreased endurance,Decreased strength,Decreased balance  Assessment: Pt tolerates session well, limited by weakness and deconditioning, has been in the hospital since October. PT to continue to progress strength and functional mobility. Prognosis: Good    REQUIRES PT FOLLOW UP: Yes    Discharge Recommendations:  Discharge Recommendations: [address]    Patient Education:  PT Education: PT Sachin Torres of Care    Equipment Recommendations:  Equipment Needed: No    Plan:  Times per week: 3-5x GM  Current Treatment Recommendations: Strengthening,Neuromuscular Re-education,Balance Training,Endurance Training,Functional Mobility Training,Positioning,Patient/Caregiver Education & Training    Goals:  Patient goals : plan to go to SNF for rehab  Short term goals  Time Frame for Short term goals: by discharge  Short term goal 1: Pt to transfer supine <--> sit min A to enable pt to get in/out of bed. Short term goal 2: Pt to sit EOB x 20 minutes with SBA for improved upright tolerance. Short term goal 3: PT to assess transfers when pt able. Long term goals  Time Frame for Long term goals : no LTGs set secondary to short ELOS    Following session, patient left in safe position with all fall risk precautions in place.

## 2022-01-31 NOTE — PROGRESS NOTES
[facility] [address]  INPATIENT OCCUPATIONAL THERAPY  [facility] ICU STEPDOWN TELEMETRY 4K  EVALUATION    Time:   Time In: 7739  Time Out: 1438  Timed Code Treatment Minutes: 8 Minutes  Minutes: 18          Date: 2022  Patient Name: Clarisse Varner,   Gender: female      MRN: [id number]  : 1950  (73 y.o.)  Referring Practitioner: Laurel Halsted. Bonny Hall MD  Diagnosis: acute on chronic respiratory failure with hypoxia  Additional Pertinent Hx: per chart review; Due to hypoxic respiratory failure on 22 from [place]. She had a prolonged hospitalization on on 10/27/21 to 21 after she was admitted due to respiratory failure. ahd a prolonged ICU stay. Had severe pneumonia with effusion required tracheostomy on 21 and decannulated on 21. 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 She was subsequently transferred to [place] on 21 and was back on 22 due to respiratory failure. She had  Loop colostomy for diversion. I was asked to see patient for the non healing coccyx wound. Pt is s/p colostomy . Restrictions/Precautions:  Restrictions/Precautions: General Precautions,Fall Risk,Contact Precautions  Position Activity Restriction  Other position/activity restrictions: PEG tube, colostomy, sacral wound    Subjective  Chart Reviewed: Yes,Orders,Progress Notes,History and Physical,Imaging  Patient assessed for rehabilitation services?: Yes  Family / Caregiver Present: No    Subjective: RN approved session. patient supine in bed upon OT arrival and agreeable to eval. A & O x 2. patient had finished eval with PT earlier and had sat EOB and was very fatigued.     Pain:  Pain Assessment  Patient Currently in Pain: Denies      Vitals: Oxygen: on 1 L O2; patient did not demo SOB supine in bed    Social/Functional History:  Lives With: Other (comment)  Type of Home: House  Home Layout: One level  Home Access: Level entry  Home Equipment: Rolling walker,Wheelchair-manual   Bathroom Shower/Tub: Tub/Shower unit  Bathroom Equipment: Grab bars in [facility] chair       ADL Assistance: [address] [facility] [address]: Needs assistance  Ambulation Assistance: Independent  Transfer Assistance: Independent    Active : Yes (prior to hospitaization)     Additional Comments: Brother reported pt has been using RW since 08/21 when had surgery/wound vac for sacral decubitus. Brother reported prior to surgery pt was walking 3,000 steps/day, although since surgery does minimal activity. At [address], pt transferred to chair with [facility] lift, PT/OT had DC'd ~2 weeks prior to admission    VISION:Corrected    HEARING:  WFL    COGNITION: Slow Processing, Decreased Recall, Decreased Insight and Decreased Arousal    RANGE OF MOTION:  Right Upper Extremity: WFL  Left Upper Extremity:  WFL    STRENGTH:  Right Upper Extremity: shldr 4-/5 elbow flex 3+/5 ext 3/5  (P)  Left Upper Extremity:  shldr 4-/5 elbow flex and ext 3/5  (P)    SENSATION:   WFL    ADL:   patient declined any ADLs including washing face. patient did request lotion for her hands that had peeling skin. lotion applied to patient's hands and patient able to rub hands together while supine in bed. BALANCE:  OTR to assess    BED MOBILITY:  Not Tested    TRANSFERS:  OTR to assess    . patient not interested in UB exer while supine in bed due to feeling fatigued and had difficulty keeping eyes open during evaluation. Activity Tolerance:  Patient tolerance of  treatment: fair.  O2 dependent, PEG tube, colostomy, wounds, de-conditioned, sedentary, weakness      Assessment:  Assessment: patient demonstrates severe de-conditioning from being in-active and sedentary from prolonged hospitalization and would benefit from continued, graded, skilled OT to increase activity tolearnce, UB strength, ease and (I) with ADLs and functional transfers to safely transition to least restrictive environment, decrease caregiver burden and increase occupational performance/quality of life. Performance deficits / Impairments: Decreased functional mobility ,Decreased cognition,Decreased ADL status,Decreased endurance,Decreased strength,Decreased balance,Decreased posture  Prognosis: Fair  REQUIRES OT FOLLOW UP: Yes  Decision Making: High Complexity    Treatment Initiated: Treatment and education initiated within context of evaluation. Evaluation time included review of current medical information, gathering information related to past medical, social and functional history, completion of standardized testing, formal and informal observation of tasks, assessment of data and development of plan of care and goals. Treatment time included skilled education and facilitation of tasks to increase safety and independence with ADL's for improved functional independence and quality of life. Discharge Recommendations:  Continue to assess pending progress,Patient would benefit from continued therapy after discharge,Subacute/Skilled Nursing Facility,F with OT,24 hour supervision or assist    Patient Education:  OT Education: OT Role,Plan of Care,Precautions,ADL Adaptive Strategies  Barriers to Learning: variable cognition, lethargy    Equipment Recommendations:  Equipment Needed: No    Plan:  Times per week: 5x  Times per day: Daily  Current Treatment Recommendations: Francine Shi Re-education,Patient/Caregiver Education & Training,Self-Care / ADL,Equipment Evaluation, Education, & procurement,Safety Education & Training. See long-term goal time frame for expected duration of plan of care. If no long-term goals established, a short length of stay is anticipated. Goals:  Patient goals : none stated  Short term goals  Time Frame for Short term goals: by discharge  Short term goal 1: OTR to assess all mobility and create goal as appropriate.   Short term goal 2: Patient will toleate minimal resistive UB and  strengthening to increase ease with grooming and mobility. Short term goal 3: patient will complete simple grooming and bathing tasks with MIN A. Following session, patient left in safe position with all fall risk precautions in place.

## 2022-01-31 NOTE — CONSULTS
CONSULTATION NOTE :ID       Patient - Alcides Lopez,  Age - 73 y.o.    - 1950      Room Number - 6N-64/978-X   MRN -  [id number]   Acct # - [de-identified]  Date of Admission -  2022  8:49 PM  Patient's PCP: Verona Gresham MD     Requesting Physician: Pipo Ricks DO    REASON FOR CONSULTATION   Evaluate coccyx wound  CHIEF COMPLAINT    shortness of breath. HISTORY OF PRESENT ILLNESS     Admitted  Due to hypoxic respiratory failure on 22 from [place]. She had a prolonged hospitalization on on 10/27/21 to 21 after she was admitted due to respiratory failure. ahd a prolonged ICU stay. Had severe pneumonia with effusion required tracheostomy on 21 and decannulated on 21. 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 She was subsequently transferred to [place] on 21 and was back on 22 due to respiratory failure. She had  Loop colostomy for diversion. I was asked to see patient for the non healing coccyx wound. her medical record was reviewed.                                                                                                                                                                     PAST MEDICAL  HISTORY       Past Medical History:   Diagnosis Date    CHF [facility]congestive heart failure) [facility])     Dr. Cece Lyles Depression     Gout attack     Hypertension     Osteoarthritis     Pulmonary artery hypertension ([facility] [address]     [employer]-- Dr. Alaina Sung-- Dr. Cece Lyles Renal calculi     Dr. Jillian Willard Thrombocytopenia [facility])        PAST SURGICAL HISTORY     Past Surgical History:   Procedure Laterality Date    APPENDECTOMY      BRONCHOSCOPY N/A 11/22/2021    BRONCHOSCOPY performed by Cheri Alvares MD at [address] N/A 11/30/2021    BRONCHOSCOPY WITHOUT FLURO performed by Cheri Alvares MD at [address] N/A 1/27/2022    LAPAROSCOPY WITH DIVERTING LOOP COLOSTOMY performed by John Phillisp MD at [facility] N/A 11/24/2021    EGD PEG TUBE PLACEMENT performed by Ra Chatman MD at [facility]    IR [address]  11/26/2021    IR CHEST TUBE INSERTION 11/26/2021 [facility]    IR NEPHROSTOMY PERCUTANEOUS LEFT  11/1/2021    IR NEPHROSTOMY PERCUTANEOUS LEFT 11/1/2021 Edgar Saldivar MD [facility]    PRESSURE ULCER DEBRIDEMENT Right 8/6/2021    EXCISION RIGHT BUTTOCK WOUND AND CLOSURE performed by Martinez Hightower MD at [address]:       Scheduled Meds:   chlorhexidine  15 mL Mouth/Throat BID    meropenem  500 mg IntraVENous Q12H    lactobacillus  1 capsule Oral Daily with breakfast    budesonide-formoterol  1 puff Inhalation BID    miconazole   Topical BID    multivitamin+  30 mL Oral Daily    midodrine  15 mg Per G Tube Q8H    allopurinol  100 mg Oral Daily    aspirin  81 mg Oral Daily    famotidine  20 mg PEG Tube Daily    FLUoxetine  40 mg Oral Daily    [Held by provider] Riociguat  2.5 mg Oral TID    sodium hypochlorite   Irrigation Daily    ferrous sulfate  325 mg Oral Q MWF    epoetin prince-epbx  4,000 Units SubCUTAneous Weekly    [Held by provider] chlorothiazide (DIURIL) IVPB  500 mg IntraVENous Q12H    sodium chloride flush  5-40 mL IntraVENous 2 times per day    [Held by provider] enoxaparin  40 mg SubCUTAneous Daily     Continuous Infusions:   sodium chloride       PRN Meds:melatonin, docusate, hydrOXYzine, senna, potassium chloride, magnesium sulfate, sodium chloride, sodium chloride flush, ondansetron **OR** ondansetron, polyethylene glycol, acetaminophen **OR** acetaminophen, sodium chloride flush  Allergies:   ALLERGIES:    Patient has no known allergies. SOCIAL HISTORY:     TOBACCO:   reports that she has never smoked. She has never used smokeless tobacco.     ETOH:   reports no history of alcohol use. Patient currently lives with family        FAMILY HISTORY:         Problem Relation Age of Onset    Diabetes Father    Shilpa Melissa Arthritis Mother    Shilpa Melissa COPD Mother     Diabetes Sister     Heart Disease Maternal Uncle     Breast Cancer Niece 36    Sleep Apnea Brother     Asthma Neg Hx     Birth Defects Neg Hx     Cancer Neg Hx     Depression Neg Hx     Early Death Neg Hx     Hearing Loss Neg Hx     High Blood Pressure Neg Hx     High Cholesterol Neg Hx     Kidney Disease Neg Hx     Learning Disabilities Neg Hx     Mental Illness Neg Hx     Mental Retardation Neg Hx     Miscarriages / Stillbirths Neg Hx     Stroke Neg Hx     Substance Abuse Neg Hx     Vision Loss Neg Hx     Other Neg Hx        REVIEW OF SYSTEMS:      unable to get much information from patient    PHYSICAL EXAM:     BP (!) 120/59   Pulse 98   Temp 98.2 °F (36.8 °C) (Oral)   Resp 18   Ht 4' 9\" (1.448 m)   Wt 145 lb 8.1 oz (66 kg)   SpO2 96%   BMI 31.49 kg/m²   General apperance:  Awake,chronically sick looking  HEENT: pale conjunctiva, unicteric sclera,healed tracheostomy site  Chest:  Bilateral air entry, diminished breath sound, +rhonchi  Cardiovascular:  RRR ,S1S2, no murmur or gallop. Abdomen:  Soft, non tender to palpation. divreting colostomy, peg tube   Left nephrostomy tube  Extremities: bilateral unsteagable heel wound  Skin:  Warm and dry. CNS:awake, very deconditioned  Coccyx full thickness open wound , stage 4 there is fibrous tissue around 10% of the wound bed. The favio wound skin is very macerated  +edema.       LABS:     CBC:   Recent Labs     01/29/22  0500 01/30/22  0945 01/31/22  0415   WBC 19.6* 16.4* 15.9*   HGB 9.3* 9.4* 9.4*   * 399 438*     BMP: Recent Labs     01/29/22  0500 01/29/22  0500 01/30/22  0945 01/30/22  2150 01/31/22  0415   *  --  148*  --  150*   K 3.6   < > 2.9* 4.3 4.4   CL 98  --  106  --  105   CO2 34*  --  29  --  32   BUN 99*  --  88*  --  103*   CREATININE 1.8*  --  1.3*  --  1.6*   GLUCOSE 104  --  90  --  125*    < > = values in this interval not displayed. Calcium:  Recent Labs     01/31/22  0415   CALCIUM 9.2       Micro:   Lab Results   Component Value Date    BC No growth-preliminary No growth  01/21/2022       Problem list of patient      Patient Active Problem List   Diagnosis Code    HTN (hypertension) I10    Chronic depression F32. A    CHF (congestive heart failure) ([facility]) I50.9    Thrombocytopenia ([facility]) D69.6    Moderate episode of recurrent major depressive disorder ([facility]) F33.1    Renal failure syndrome N19    Hyperkalemia E87.5    Isolated non-nephrotic proteinuria R80.0    ALVIN (acute kidney injury) ([facility] [address] N17.9    Chronic right-sided heart failure ([facility]) I50.812    Pulmonary HTN ([facility]) I27.20    Hypotension due to hypovolemia I95.89, E86.1    Acute renal failure superimposed on stage 4 chronic kidney disease ([facility]) N17.9, N18.4    Pressure injury of sacral region, stage 4 ([facility]) L89.154    Acute respiratory failure ([facility]) J96.00    Flash pulmonary edema ([facility]) J81.0    Shock ([facility]) R57.9    Aspiration pneumonia of left lung ([facility]) J69.0    Pleural effusion J90    Paroxysmal atrial fibrillation ([facility]) I48.0    Hemoptysis R04.2    Chronic respiratory failure with hypoxia ([facility]) J96.11    Pneumothorax, right J93.9    Collapse of left lung J98.11    Abnormal chest x-ray R93.89    Acute on chronic respiratory failure with hypoxia ([facility]) J96.21    Retroperitoneal hematoma K66.1           Impression and Recommendation:   Stage 4 coccyx wound: post debridement. There is periwound maceration: will apply zinc oxide to the skin. Will continue dakins solution.  Change twice a day and as needed  Severe malnutrition  S/p diverting loop colostomy: functioning well  Retroperitoneal hematoma: stable  Gall stone with dilated CBD: no obstruction. ANCA positive vasculites  Left nephrostomy tube for obstruction  Will stop antibiotic  Infection Control:   Contact isolation  Discharge Planning (Coordination of out patient care: Will need ECFwhen more stable  Thank you Rayne Addison DO for allowing me to participate in this patient's care.     Petar Jacobson MD, MD,FACP 1/31/2022 10:37 AM

## 2022-01-31 NOTE — CARE COORDINATION
1/31/22, 9:55 AM EST  DISCHARGE PLANNING EVALUATION    SW called sister Cesario Ma and she reported that patient is from [address] but they are not wanting to return. She reported that she would like patient to go to [address] , [employer] [facility] or [facility]. DOT called [address]  and spoke with Zachariah urias. Zachariah urias reported that he will review clinical information and let DOT know. DOT called and left voicemail for [employer] at [facility] asking for a call back. 1/31/22, 10:37 AM EST  DISCHARGE PLANNING EVALUATION    SW received a call from [employer] and [place] at [facility] and she reported that they have denied patient a few times in the last few weeks but are willing to look over updated clinical information and let SW know.

## 2022-01-31 NOTE — PROGRESS NOTES
[facility] ICU STEPDOWN TELEMETRY 4K  Clinical Swallow Evaluation      SLP Individual Minutes  Time In: 2577  Time Out: 7632  Minutes: 19  Timed Code Treatment Minutes: 0 Minutes       Date: 2022  Patient Name: Glenn Simon      CSN: [id number]   : 1950  (73 y.o.)  Gender: female   Referring Physician:  Dr. Dinah Lopez  Diagnosis: Acute on chronic respiratory failure with hypoxia   Secondary Diagnosis: Dysphagia   History of Present Illness/Injury: Patient admitted to [facility] with above dx. Patient admit to [facility] with above medical dx. Per chart review, Kenzie Diop is a 82-year-old  female transferred to [facility] on 2022 with acute respiratory failure, hypoxia.  Patient is currently on a BiPAP.     Patient has past medical history significant for everyday smoker, BARBER, PAF s/p cardioversion 11/3/2021, PAH grp 3, HFpEF-ECHO 10/2021 LV 60 to 65% with trace tricuspid regurg.  RV with normal function, essential hypertension, depression, gout, osteoarthritis, renal calculi, thrombocytopenia, stage 3 sacral ulcer s/p wound ostomy. Recent [facility] hospitalization 10/ patient suffered a very lengthy hospitalization secondary to acute hypoxic and hypercapnic respiratory failure felt to be secondary to severe pneumonia with left-sided pleural effusion and repeated mucous plugging.  Patient did require tracheostomy on 2021, and de cannulated 2021.  Stage IV decubitus ulcer with suspected osteomyelitis, ALVIN likely ATN, ANCA associated Vasculitis, hydronephrosis secondary to left-sided staghorn calculi percutaneous tube was placed on 2021, bilateral pleural effusions felt to be transudative status post thoracentesis of 1 L on 2021.  Transferred to [facility] 2021.      Per family noted decreased responsiveness and change in mental status over the last 2 days with vague complaint of back and arm pain.  An EKG was completed on 1/19/2022 with no acute ST changes.  And troponin evaluation revealed 0.176 and 0.155.  Patient became hypoxic and hypercapnic and was transitioned to BiPAP therapy.  1/20/2022 Dr. Jimbo Avila concern with sepsis and requested patient to be transferred to the ICU for further management and care. With chart review, no h/o instrumental swallowing assessment completed while at [facility]. completed 01/21 recommending NPO. Patient intubated 01/24 with extubation 01/29. ST re-consulted to complete repeat CSE post extubation (01/29) and determine patient appropriateness for further diet initiation versus completion of instrumental swallowing assessment. Past Medical History:   Diagnosis Date    CHF [facility]congestive heart failure) ([facility])     Dr. Eileen Munoz Depression     Gout attack     Hypertension     Osteoarthritis     Pulmonary artery hypertension ([facility] [address]     [facility]-- Dr. Charmayne Bogaert-- Dr. Eileen Munoz Renal calculi     Dr. Angie Reyes [facility])        SUBJECTIVE:  Patient seen at bedside; alert and pleasant. Patient with delayed processing speed and limited command following. No family present. OBJECTIVE:    Pain:  No pain reported.     Current Diet: NPO; PEG tube     Respiratory Status:  Nasal Canula 1L    Behavioral Observation:  Alert and Limited Direction Following    Oral Mechanism Evaluation:      Facial / Labial Impaired Decreased tone and ROM, significant deconditioning    Lingual Impaired Very limited ROM, poor coordination, significant deconditioning     Dentition WFL    Velum Not Tested    Vocal Quality Impaired Hoarse/raspy vocal quality, weak vocal intensity    Sensation Impaired    Cough Impaired Weak      Patient Evaluated Using:  Ice chips, thin liquids via spoon + cup     Oral Phase:  Impaired:  concerns for decreased oral bolus control      Pharyngeal Phase: Impaired:  Delayed Swallow, Decreased Hyolaryngeal Elevation, Audible Swallow and Suspected Pharyngeal Residue     Signs and Symptoms of Laryngeal Penetration/Aspiration: Immediate and Delayed Cough     Impresssions: Patient presents with severe oral dysphagia with inability to fully discern potential presence of pharyngeal phase deficits without formal instrumentation. HIGH concerns for decreased oral bolus control leading to suspected premature pharyngeal spillage with a delayed/audible swallow, concerns for decreased laryngeal elevation s/p severely deconditioned state and suspected poor pharyngeal stripping with multiple swallows present. Immediate and delayed weak cough following all PO trials. Patient also with need to utilize oral suction throughout trials d/t build up of congested mucous with inability to self clear. Patient at severely heightened risks for airway invasion events. Recommendations for continuation of NPO with PEG tube as alternative means of nutrition. ST to follow up with ongoing dysphagia intervention. *post evaluation, patient without respiratory distress upon leaving room; RN Renata Samuel notified re: clinical findings and recommendations from the assessment; verbal receptiveness noted        RECOMMENDATIONS/ASSESSMENT:  Instrumental Evaluation: Instrumental evaluation not indicated at this time. Diet Recommendations:  NPO   Strategies:  Recommend NPO- oral care   Rehabilitation Potential: fair    EDUCATION:  Learner: Patient  Education:  Reviewed results and recommendations of this evaluation, Reviewed diet and strategies, Reviewed signs, symptoms and risks of aspiration, Reviewed ST goals and Plan of Care, Reviewed recommendations for follow-up and Education Related to Potential Risks and Complications Due to Impairment/Illness/Injury  Evaluation of Education: Verbalizes understanding, Needs further instruction, No evidence of learning and Family not present    PLAN:  Skilled SLP intervention on acute care 3-5 x per week or until goals met and/or pt plateaus in function.   Specific interventions for next session may include: dysphagia tx, complete cognitive/speech/language evaluation when clinically appropriate. PATIENT GOAL:    Did not state. Will further assess during treatment. SHORT TERM GOALS:  Short-term Goals  Timeframe for Short-term Goals: 2 weeks  Goal 1: Patient will consume PO trials of ice chips, thin liquids, and purees (as deemed clinically indicated) demonstrating consistency for pharyngeal swallow trigger, endurance, and stable RR to determine readiness for instrumental evaluation. Goal 2: Staff will exhibit return demonstration for completion of oral care to assist with maximizing oral integrity and to reduce potential bacteria colonization. Goal 3: Complete instrumental evaluation (FEES or MBS) when clinically appropriate in order to furthur evaluate pharyngeal function of the swallow and determine safety of PO intake and furthur goals/POC  Goal 4: Patient will complete oropharyngeal strengthening exercises (i.e. lingual lateralization/protrusion/circles, effortful swallows) x10 for improved strengthening/endurance needed for enhanced tolerance of PO intake. Goal 5: Complete cognitive/speech/language evaluation when clinically appropriate and determine goals and POC as appropriate.     LONG TERM GOALS:  No LTGs due to short [address] [place] [address].SGolden Rhode Island Homeopathic HospitalGreystone Park Psychiatric Hospitaldeanna

## 2022-01-31 NOTE — PROGRESS NOTES
Hospitalist Progress Note      Patient:  Glenn Simon    Unit/Bed:4-27/027-A  YOB: 1950  MRN: [id number]   Acct: [de-identified]   PCP: El Garay MD  Date of Admission: 1/20/2022    Assessment/Plan:    #Acute on chronic hypoxic respiratory failure: Intubated 1/24/2022 due to concerns patient was not protecting airway. Laparoscopy diverting transverse loop colostomy performed 1/27/2022. Patient weaned off pressors and was on minimum vent sedation/settings. Patient appropriately extubated, weaned down to 1-2 L nasal cannula. -Continue monitor for signs/symptoms of respiratory distress  -Wean oxygen as tolerated  #Community-acquired pneumonia: Pneumonia panel positive for Proteus and Pseudomonas. Culture growing Candida, likely contaminant. Initiated on Zosyn for 2 days initially, transitioned over to meropenem. Sensitivities indicate appropriate antibiotic usage. ID consulted, appreciate recs  -Meropenem discontinued  #Hypernatremia: Likely secondary to dehydration, Na 150. Free water deficit 2.36L. Free water flushes initially 30mL q8hr. -Increase free water flushes to 240mL q8hr   -Re-check sodium level this afternoon   -BMP in AM  #UTI: Present on arrival.  Previous culture ESBL producing.  -Continue with meropenem per above  #Pulmonary hypertension: History of.  -Held Diuril, patient with 15L urinary output  #Stage IV decubitus ulcer: General surgery consulted. Patient underwent diverting colostomy 1/27/2022. 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 Uro consulted, no further intervention  - I's and O's  #CKD: Cr stable.  Urine output adequate  -Is/Os  -Will continue to monitor  #Macrocytic anemia: Hgb stable  - Continue weekly retacrit  #Gout: History of.  -Continue allopurinol  #Dilated CBD: US gallbladder demonstrated CBD dilation of 14mm. MRCP with contrast reported small stone in bile duct with 8mm dilatation. GI consulted, no plans for acute intervention  #BARBER: History of. Patient off of ventilator and tolerating nasal cannula  - Follow-up outpatient  #Obesity: BMI 31.49      Chief Complaint: Shortness of breath    Initial H and P:-    Per HPI: Hussain Diego is a 72-year-old  female transferred to Psychiatric ICU on 1/20/2022 with acute respiratory failure, hypoxia. Patient is currently on a BiPAP.     Patient has past medical history significant for everyday smoker, BARBER, PAF s/p cardioversion 11/3/2021, PAH grp 3, HFpEF-ECHO 10/2021 LV 60 to 65% with trace tricuspid regurg. RV with normal function, essential hypertension, depression, gout, osteoarthritis, renal calculi, thrombocytopenia, stage 3 sacral ulcer s/p wound ostomy. Recent Psychiatric hospitalization 10/27-12/1/2021 patient suffered a very lengthy hospitalization secondary to acute hypoxic and hypercapnic respiratory failure felt to be secondary to severe pneumonia with left-sided pleural effusion and repeated mucous plugging. Patient did require tracheostomy on 11/17/2021, and de cannulated 12/23/2021. Stage IV decubitus ulcer with suspected osteomyelitis, ALVIN likely ATN, ANCA associated Vasculitis, hydronephrosis secondary to left-sided staghorn calculi percutaneous tube was placed on 11/12/2021, bilateral pleural effusions felt to be transudative status post thoracentesis of 1 L on 12/1/2021. Transferred to [facility] 12/1/2021.      Per family noted decreased responsiveness and change in mental status over the last 2 days with vague complaint of back and arm pain. An EKG was completed on 1/19/2022 with no acute ST changes. And troponin evaluation revealed 0.176 and 0.155.   Patient became hypoxic and hypercapnic and was transitioned Formula; Continuous; 30; No; 240; Q 8 hours    Exam:    BP (!) 113/46   Pulse 89   Temp 98.2 °F (36.8 °C) (Oral)   Resp 16   Ht 4' 9\" (1.448 m)   Wt 145 lb 8.1 oz (66 kg)   SpO2 96%   BMI 31.49 kg/m²       General appearance: Chronically ill-appearing female  HEENT: Pupils equal, round, and reactive to light. Conjunctivae/corneas clear. Neck: Supple, with full range of motion. No jugular venous distention. Trachea midline. Respiratory:  Normal respiratory effort. Clear to auscultation, bilaterally without Rales/Wheezes/Rhonchi. Cardiovascular: Regular rate and rhythm with normal S1/S2 without murmurs, rubs or gallops. Abdomen: Soft, colostomy present, NT  Musculoskeletal: passive and active ROM x 4 extremities. Skin: Skin color, texture, turgor normal.  No rashes or lesions. Neurologic:  Neurovascularly intact without any focal sensory/motor deficits. Cranial nerves: II-XII intact, grossly non-focal.  Psychiatric: Alert and oriented, thought content appropriate, normal insight  Capillary Refill: Brisk,< 3 seconds   Peripheral Pulses: +2 palpable, equal bilaterally       Labs:   Recent Labs     01/29/22  0500 01/30/22  0945 01/31/22  0415   WBC 19.6* 16.4* 15.9*   HGB 9.3* 9.4* 9.4*   HCT 31.9* 31.5* 33.5*   * 399 438*     Recent Labs     01/29/22  0500 01/29/22  0500 01/30/22  0945 01/30/22  2150 01/31/22  0415   *  --  148*  --  150*   K 3.6   < > 2.9* 4.3 4.4   CL 98  --  106  --  105   CO2 34*  --  29  --  32   BUN 99*  --  88*  --  103*   CREATININE 1.8*  --  1.3*  --  1.6*   CALCIUM 9.4  --  7.9*  --  9.2    < > = values in this interval not displayed. No results for input(s): AST, ALT, BILIDIR, BILITOT, ALKPHOS in the last 72 hours. No results for input(s): INR in the last 72 hours. No results for input(s): Martha Galicia in the last 72 hours.     Microbiology:    Blood culture #1:   Lab Results   Component Value Date    BC No growth-preliminary No growth  01/21/2022 Blood culture #2:No results found for: [facility]    Organism:  Lab Results   Component Value Date    ORG Candida albicans 01/24/2022         Lab Results   Component Value Date    LABGRAM  01/24/2022     Quality of sputum specimen: Specimen acceptable. Moderate segmented neutrophils observed. Rare epithelial cells observed. No bacteria seen. MRSA culture only:No results found for: [facility]    Urine culture:   Lab Results   Component Value Date    LABURIN No growth-preliminary  01/20/2022    LABURIN [place] count: 1,000 CFU/mL  01/20/2022       Respiratory culture: No results found for: CULTRESP    Aerobic and Anaerobic :  Lab Results   Component Value Date    LABAERO  11/19/2021     Culture also yielded very light growth of Staphylococcus species (coagulase negative), and additional enteric gram negative bacilli. At least one of drugs in current antibiotic therapy is ineffective in vitro for isolate. LABAERO moderate growth  11/19/2021    LABAERO light growth  11/19/2021     Lab Results   Component Value Date    LABANAE No growth-preliminary No growth  12/01/2021       Urinalysis:      Lab Results   Component Value Date    NITRU NEGATIVE 01/20/2022    WBCUA 50-75 01/20/2022    BACTERIA NONE SEEN 01/20/2022    RBCUA  01/20/2022    BLOODU LARGE 01/20/2022    SPECGRAV 1.014 01/20/2022    GLUCOSEU NEGATIVE 01/19/2022       Radiology:  MRI ABDOMEN WO CONTRAST MRCP   Final Result   1. Small 2 mm distal common bile duct stone with mild dilatation of the    common bile duct at 8 mm (upper normal 7 mm for patient's age). No    intrahepatic biliary dilatation. 2. Cholelithiasis without gallbladder wall edema. 3. 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   4. Left subcapsular renal hematoma measuring 2.2 cm in width with    extension to the left posterior paranephric spaces is seen on recent CT. Underlying left renal stones.    5. Benign right renal cysts. 6. Partially seen bilateral pleural effusions. Mild upper abdominal    ascites. This document has been electronically signed by: Jessica Houston MD on    01/26/2022 08:33 PM      CT HEAD WO CONTRAST   Final Result   1. No acute intracranial process. 2. Subcortical/periventricular white matter hypoattenuation statistically    representing changes of chronic microvascular ischemia. Global parenchymal    volume loss which is commensurate with reported age. This document has been electronically signed by: Rand Ibarra DO on    01/24/2022 05:49 AM      All CTs at this facility use dose modulation techniques and iterative    reconstructions, and/or weight-based dosing   when appropriate to reduce radiation to a low as reasonably achievable. XR CHEST PORTABLE   Final Result   ET tube 2.5 cm above the avelina. This document has been electronically signed by: Naveen Gleason MD on 01/24/2022 04:51 AM      XR CHEST PORTABLE   Final Result   Improving heart failure. This document has been electronically signed by: Naveen Gleason MD on 01/24/2022 12:38 AM      XR CHEST PORTABLE   Final Result   1. Stable cardiomegaly with pulmonary vascular congestion suspicious for congestive heart failure. 2. Small left-sided pleural effusion. 3. Bilateral atelectasis/infiltrate. **This report has been created using voice recognition software. It may contain minor errors which are inherent in voice recognition technology. **      Final report electronically signed by Dr. Forrest Portillo on 1/23/2022 10:39 AM      XR CHEST PORTABLE   Final Result   Impression:   Cardiomegaly with pulmonary vascular congestion and bilateral pleural    effusions, similar to prior study. This document has been electronically signed by: Leland De Souza MD on    01/22/2022 04:39 AM      VL DUP LOWER EXTREMITY VENOUS BILATERAL   Final Result   No evidence of a DVT. **This report has been created using voice recognition software. It may contain minor errors which are inherent in voice recognition technology. **      Final report electronically signed by Dr Louie Jefferson on 1/21/2022 1:43 PM      US GALLBLADDER RUQ   Final Result   Impression:      Cholelithiasis considerable ductal dilation      This document has been electronically signed by: Waqas Mckinney MD on    01/21/2022 01:43 AM      CT CHEST WO CONTRAST   Final Result   Impression:      Bilateral pleural effusions and atelectasis with cardiomegaly. Question pulmonary edema. This document has been electronically signed by: Waqas Mckinney MD on    01/20/2022 11:23 PM      All CTs at this facility use dose modulation techniques and iterative    reconstructions, and/or weight-based dosing   when appropriate to reduce radiation to a low as reasonably achievable. CT ABDOMEN PELVIS WO CONTRAST Additional Contrast? None   Final Result   Impression:      Left perinephric and renal subcapsular hematomas      Loculated left retroperitoneal fluid, question hematoma versus abscess. Anasarca pattern noted with body wall edema      This document has been electronically signed by: Waqas Mckinney MD on    01/20/2022 11:29 PM      All CTs at this facility use dose modulation techniques and iterative    reconstructions, and/or weight-based dosing   when appropriate to reduce radiation to a low as reasonably achievable. XR CHEST PORTABLE   Final Result   Impression:   1. Lines and tubes as above. 2. Changes of the pulmonary parenchyma concerning for mild bilateral    pneumonia.       This document has been electronically signed by: Giovanni Schmitz MD on    01/20/2022 09:40 PM        ECHO Limited    Result Date: 1/21/2022  Transthoracic Echocardiography Report (TTE)  Demographics   Patient Name   [address] [facility] Gender              Female   MR #           [id number]     Race                 Ethnicity   Account #      [de-identified]     Room Number         0007   Accession      [id number]    Date of Study       01/21/2022  Number   Date of Birth  1950    Referring Physician Tyrone Pan MD   Age            73 year(s)    [address]                                Interpreting        Echo reader of the week                               Physician           Reed Palencia MD  Procedure Type of Study   TTE procedure:ECHOCARDIOGRAM LIMITED. Procedure Date Date: 01/21/2022 Start: 08:30 AM Study Location: Bedside Technical Quality: Adequate visualization Indications:Hypoxia. Additional Medical History:Osteoarthritis, Hypertension, Acute kidney injury, Pulmonary hypertension Patient Status: Routine Height: 57 inches Weight: 178 pounds BSA: 1.71 m^2 BMI: 38.52 kg/m^2 BP: 104/57 mmHg Allergies   - No Known Allergies.   - No Known Allergies. Conclusions   Summary  Normal left ventricle size and systolic function. Ejection fraction was  estimated at 60 %. There were no regional left ventricular wall motion  abnormalities and wall thickness was within normal limits. Signature   ----------------------------------------------------------------  Electronically signed by Reed Palencia MD (Interpreting  physician) on 01/21/2022 at 04:53 PM  ----------------------------------------------------------------   Findings   Mitral Valve  The mitral valve structure was normal with normal leaflet separation. Aortic Valve  The aortic valve was trileaflet with normal thickness and cuspal  separation. Tricuspid Valve  The tricuspid valve structure was normal with normal leaflet separation   Pulmonic Valve  The pulmonic valve leaflets exhibited normal thickness, no calcification,  and normal cuspal separation.    Left Atrium  The left adrenal glands are within normal limits. Multiple left renal stones with left nephrostomy catheter. Subcapsular hematoma left kidney, not well assessed without contrast. Retroperitoneal hematoma also noted along with perinephric blood. Posterior left perinephric fluid is somewhat loculated. Abscess cannot be excluded. Left kidney displaced anteriorly. Right kidney unremarkable without stones or hydronephrosis. No evidence for aortic aneurysm. No free fluid or adenopathy is noted in the pelvis. No evidence for diverticulitis. Appendix not identified. Question partial hysterectomy. No adnexal abnormalities. Subcutaneous edema consistent with anasarca. Impression: Left perinephric and renal subcapsular hematomas Loculated left retroperitoneal fluid, question hematoma versus abscess. Anasarca pattern noted with body wall edema This document has been electronically signed by: Jatin Cassidy MD on 01/20/2022 11:29 PM All CTs at this facility use dose modulation techniques and iterative reconstructions, and/or weight-based dosing when appropriate to reduce radiation to a low as reasonably achievable. CT CHEST WO CONTRAST    Result Date: 1/20/2022  CT chest without contrast Comparison: None Findings: Cardiomegaly with bilateral pleural effusions. Bilateral lower lobe atelectasis noted. Pulmonary edema is possible. No mediastinal or hilar adenopathy. Dense coronary artery calcifications. Gastrostomy tube noted in mid stomach. Heterogeneous enlarged thyroid, consider follow-up ultrasound. No significant focal bony abnormalities. Impression: Bilateral pleural effusions and atelectasis with cardiomegaly. Question pulmonary edema. This document has been electronically signed by: Jatin Cassidy MD on 01/20/2022 11:23 PM All CTs at this facility use dose modulation techniques and iterative reconstructions, and/or weight-based dosing when appropriate to reduce radiation to a low as reasonably achievable.     IR FLUORO GUIDED CVA DEVICE PLMT/REPLACE/REMOVAL    Result Date: 1/20/2022  PICC LINE INSERTION WITH ULTRASOUND AND FLUOROSCOPIC GUIDANCE: CLINICAL INFORMATION: 72-year-old female with renal failure, respiratory failure, sepsis. Poor intravenous access. PERFORMED BY: Mina Arroyo. Tomas Hugo M.D. APPROACH:  Right Internal Jugular Vein, ultrasound guidance, micropuncture technique. CATHETER: 6 Western Suzi triple lumen power PICC CATHETER LENGTH: 15 cm CATHETER TIP:  Right Atrium FLUOROSCOPY TIME: 1 minute 9 seconds FLUOROSCOPIC IMAGES: 1 PROCEDURE:  Signed informed consent was obtained prior to performing this procedure. The patient was not sedated during this procedure. Following local anesthesia and utilizing MAXIMAL STERILE BARRIER TECHNIQUE, the vein listed above was punctured with a micropuncture needle, utilizing ultrasound guidance, and a sonographic image was obtained for confirmation of needle position and vessel patency. .  A .018 wire was passed through this needle, followed by insertion of a 4 Western Suzi dilator. A peel-away sheath of the appropriate size was then advanced over the 018 wire. The PICC line was cut to the appropriate length and then advanced through the peel-away sheath, and the sheath was removed. This was all performed with fluoroscopic guidance. A spot film of the chest was obtained to document appropriate catheter position. The lumens of the catheter were flushed with saline, and positive pressure caps were applied. The hub of the catheter was then stabilized to the skin with 3-0 silk sutures and a sterile op-site dressing was applied. Status post successful PICC line insertion. **This report has been created using voice recognition software. It may contain minor errors which are inherent in voice recognition technology. ** Final report electronically signed by Dr Neptali Foss on 1/20/2022 5:30 PM    US GALLBLADDER RUQ    Result Date: 1/21/2022  US abdomen limited Comparison: None Findings: Study was limited by abdominal bandages. Liver normal size and echotexture. Right lobe 18.3 cm length. Pancreas partially obscured, no definite abnormality. Cholelithiasis entire gallbladder lumen without wall thickening. Common duct 14 mm diameter. No ultrasonographic Joseph sign. Impression: Cholelithiasis considerable ductal dilation This document has been electronically signed by: Dillon Perez MD on 01/21/2022 01:43 AM    XR CHEST PORTABLE    Result Date: 1/20/2022  Single view chest Comparison: 12/7/2021 Findings: Current study shows a right IJ central line with its tip at the proximal right atrium. Tracheostomy and dual lumen left central line appear to have been removed. Catheter of unknown utility projects over the left upper quadrant. Multiple extrinsic leads and tubes are also noted. The heart size appears slightly improved with only mild cardiomegaly now evident. There is a small pleural effusion on the left which is obscuring the left hemidiaphragm with mild blunting at the costophrenic sulci. Lungs are hypoventilated bilaterally. There is faint infiltrate at the right upper lobe centrally and at the right medial base. The medial left upper lobe also shows patchy somewhat nodular infiltrate and atelectasis. Atheromatous calcifications are redemonstrated the aortic arch. The bony structures appear to be intact without obvious change from comparison. There is no pneumothorax. Impression: 1. Lines and tubes as above. 2. Changes of the pulmonary parenchyma concerning for mild bilateral pneumonia. This document has been electronically signed by: Ulices Adames MD on 01/20/2022 09:40 PM    VL DUP LOWER EXTREMITY VENOUS BILATERAL    Result Date: 1/21/2022  PROCEDURE: VL DUP LOWER EXTREMITY VENOUS BILATERAL CLINICAL INFORMATION: Evaluation for DVT in a 72-year-old female. COMPARISON: No prior study.  TECHNIQUE: Venous doppler ultrasound was performed of the bilateral lower extremities using gray scale, color flow and spectral doppler imaging. FINDINGS: There is normal color flow, spectral analysis and compressibility of the common femoral vein, superficial femoral vein and popliteal vein bilaterally . There is normal color flow and compressibility in the posterior tibial veins, anterior tibial veins and peroneal veins. No evidence of a DVT. **This report has been created using voice recognition software. It may contain minor errors which are inherent in voice recognition technology. ** Final report electronically signed by Dr Sanna Tejeda on 1/21/2022 1:43 PM      Electronically signed by Agapito Michael MD on 1/31/2022 at 2:24 PM

## 2022-01-31 NOTE — CARE COORDINATION
1/31/22, 1:34 PM EST  DISCHARGE PLANNING EVALUATION:    Ml Lopez       Admitted: 1/20/2022/ 2049   Hospital day: 11   Location: -27/027-A Reason for admit: Acute on chronic respiratory failure with hypoxia ([address] [J96.21]   PMH:  has a past medical history of CHF (congestive heart failure) ([address] Depression, Gout attack, Hypertension, Osteoarthritis, Pulmonary artery hypertension ([address] Renal calculi, and Thrombocytopenia ([address] Barriers to Discharge:  From [facility] (ventilator there). Pneumonia/Retroperitoneal Hematoma/UTI. 1/18 Left Nephrostomy Tube Changed. 1/27 Diverting Ostomy for nonhealing coccyx wound. PCP: Mukul Griffin MD  Readmission Risk Score: 23.4 ( )%    Patient Goals/Plan/Treatment Preferences: from [facility] (7 weeks there); prefers new possible SNF [facility] [place] [facility]) for ostomy teaching; SW following (AB due to stop soon); therapy following  Transportation/Food Security/Housekeeping Addressed:  No issues identified.

## 2022-01-31 NOTE — PROGRESS NOTES
POD#4 s/p diverting loop colostomy    Stable po  Off vent-  Vitals:    01/30/22 2222 01/30/22 2339 01/31/22 0002 01/31/22 0332   BP:   131/60 135/63   Pulse: 95 95 94 107   Resp: 21 18 16 16   Temp:   97.6 °F (36.4 °C) 99.2 °F (37.3 °C)   TempSrc:   Oral Oral   SpO2: 96% 95% 96% 96%   Weight:       Height:       Results for Ricky Garcia (MRN [id number]) as of 1/31/2022 06:43   Ref.  Range 1/31/2022 04:15   WBC Latest Ref Range: 4.8 - 10.8 thou/mm3 15.9 (H)   RBC Latest Ref Range: 4.20 - 5.40 mill/mm3 3.30 (L)   Hemoglobin Quant Latest Ref Range: 12.0 - 16.0 gm/dl 9.4 (L)   Hematocrit Latest Ref Range: 37.0 - 47.0 % 33.5 (L)   MCV Latest Ref Range: 81.0 - 99.0 fL 101.5 (H)   MCH Latest Ref Range: 26.0 - 33.0 pg 28.5   MCHC Latest Ref Range: 32.2 - 35.5 gm/dl 28.1 (L)   MPV Latest Ref Range: 9.4 - 12.4 fL 9.3 (L)   RDW-CV Latest Ref Range: 11.5 - 14.5 % 16.2 (H)   RDW-SD Latest Ref Range: 35.0 - 45.0 fL 60.3 (H)   Platelet Count Latest Ref Range: 130 - 400 thou/mm3 438 (H)   Lymphocytes Absolute Latest Ref Range: 1.0 - 4.8 thou/mm3 1.3   Monocytes Absolute Latest Ref Range: 0.4 - 1.3 thou/mm3 1.1   Eosinophils Absolute Latest Ref Range: 0.0 - 0.4 thou/mm3 0.3   Basophils Absolute Latest Ref Range: 0.0 - 0.1 thou/mm3 0.1   Seg Neutrophils Latest Units: % 81.5   Segs Absolute Latest Ref Range: 1.8 - 7.7 thou/mm3 13.0 (H)   Lymphocytes Latest Units: % 7.9   Monocytes Latest Units: % 6.8   Eosinophils Latest Units: % 2.1   Basophils Latest Units: % 0.8   Immature Grans (Abs) Latest Ref Range: 0.00 - 0.07 thou/mm3 0.15 (H)   Immature Granulocytes Latest Units: % 0.9       Ostomy pink, bag in place  TF restarted  Stable po

## 2022-02-01 LAB
ANION GAP SERPL CALCULATED.3IONS-SCNC: 12 MEQ/L (ref 8–16)
BASOPHILS # BLD: 1 %
BASOPHILS ABSOLUTE: 0.2 THOU/MM3 (ref 0–0.1)
BUN BLDV-MCNC: 102 MG/DL (ref 7–22)
CALCIUM SERPL-MCNC: 9.6 MG/DL (ref 8.5–10.5)
CHLORIDE BLD-SCNC: 109 MEQ/L (ref 98–111)
CO2: 33 MEQ/L (ref 23–33)
CREAT SERPL-MCNC: 1.4 MG/DL (ref 0.4–1.2)
EOSINOPHIL # BLD: 3.4 %
EOSINOPHILS ABSOLUTE: 0.5 THOU/MM3 (ref 0–0.4)
ERYTHROCYTE [DISTWIDTH] IN BLOOD BY AUTOMATED COUNT: 16.3 % (ref 11.5–14.5)
ERYTHROCYTE [DISTWIDTH] IN BLOOD BY AUTOMATED COUNT: 59.7 FL (ref 35–45)
GFR SERPL CREATININE-BSD FRML MDRD: 37 ML/MIN/1.73M2
GLUCOSE BLD-MCNC: 112 MG/DL (ref 70–108)
HCT VFR BLD CALC: 33.8 % (ref 37–47)
HEMOGLOBIN: 9.5 GM/DL (ref 12–16)
HYPOCHROMIA: PRESENT
IMMATURE GRANS (ABS): 0.11 THOU/MM3 (ref 0–0.07)
IMMATURE GRANULOCYTES: 0.7 %
LYMPHOCYTES # BLD: 8.5 %
LYMPHOCYTES ABSOLUTE: 1.3 THOU/MM3 (ref 1–4.8)
MCH RBC QN AUTO: 28.4 PG (ref 26–33)
MCHC RBC AUTO-ENTMCNC: 28.1 GM/DL (ref 32.2–35.5)
MCV RBC AUTO: 101.2 FL (ref 81–99)
MONOCYTES # BLD: 6.1 %
MONOCYTES ABSOLUTE: 1 THOU/MM3 (ref 0.4–1.3)
NUCLEATED RED BLOOD CELLS: 0 /100 WBC
PLATELET # BLD: 450 THOU/MM3 (ref 130–400)
PMV BLD AUTO: 9.4 FL (ref 9.4–12.4)
POTASSIUM SERPL-SCNC: 4.2 MEQ/L (ref 3.5–5.2)
RBC # BLD: 3.34 MILL/MM3 (ref 4.2–5.4)
SEG NEUTROPHILS: 80.3 %
SEGMENTED NEUTROPHILS ABSOLUTE COUNT: 12.5 THOU/MM3 (ref 1.8–7.7)
SODIUM BLD-SCNC: 150 MEQ/L (ref 135–145)
SODIUM BLD-SCNC: 153 MEQ/L (ref 135–145)
SODIUM BLD-SCNC: 154 MEQ/L (ref 135–145)
T4 FREE: 1.05 NG/DL (ref 0.93–1.76)
WBC # BLD: 15.6 THOU/MM3 (ref 4.8–10.8)

## 2022-02-01 PROCEDURE — 80048 BASIC METABOLIC PNL TOTAL CA: CPT

## 2022-02-01 PROCEDURE — 85025 COMPLETE CBC W/AUTO DIFF WBC: CPT

## 2022-02-01 PROCEDURE — 2580000003 HC RX 258: Performed by: INTERNAL MEDICINE

## 2022-02-01 PROCEDURE — 36415 COLL VENOUS BLD VENIPUNCTURE: CPT

## 2022-02-01 PROCEDURE — 94760 N-INVAS EAR/PLS OXIMETRY 1: CPT

## 2022-02-01 PROCEDURE — 97535 SELF CARE MNGMENT TRAINING: CPT

## 2022-02-01 PROCEDURE — 6370000000 HC RX 637 (ALT 250 FOR IP): Performed by: INTERNAL MEDICINE

## 2022-02-01 PROCEDURE — 99233 SBSQ HOSP IP/OBS HIGH 50: CPT | Performed by: INTERNAL MEDICINE

## 2022-02-01 PROCEDURE — 97530 THERAPEUTIC ACTIVITIES: CPT

## 2022-02-01 PROCEDURE — 2580000003 HC RX 258: Performed by: NURSE PRACTITIONER

## 2022-02-01 PROCEDURE — 92526 ORAL FUNCTION THERAPY: CPT

## 2022-02-01 PROCEDURE — 6370000000 HC RX 637 (ALT 250 FOR IP): Performed by: NURSE PRACTITIONER

## 2022-02-01 PROCEDURE — 97110 THERAPEUTIC EXERCISES: CPT

## 2022-02-01 PROCEDURE — 84439 ASSAY OF FREE THYROXINE: CPT

## 2022-02-01 PROCEDURE — 84295 ASSAY OF SERUM SODIUM: CPT

## 2022-02-01 PROCEDURE — 2060000000 HC ICU INTERMEDIATE R&B

## 2022-02-01 PROCEDURE — 94640 AIRWAY INHALATION TREATMENT: CPT

## 2022-02-01 PROCEDURE — 99233 SBSQ HOSP IP/OBS HIGH 50: CPT | Performed by: UROLOGY

## 2022-02-01 PROCEDURE — 6370000000 HC RX 637 (ALT 250 FOR IP): Performed by: STUDENT IN AN ORGANIZED HEALTH CARE EDUCATION/TRAINING PROGRAM

## 2022-02-01 RX ORDER — MIDODRINE HYDROCHLORIDE 10 MG/1
10 TABLET ORAL EVERY 8 HOURS
Status: DISCONTINUED | OUTPATIENT
Start: 2022-02-01 | End: 2022-02-04 | Stop reason: HOSPADM

## 2022-02-01 RX ORDER — FOLIC ACID 1 MG/1
1 TABLET ORAL DAILY
Status: DISCONTINUED | OUTPATIENT
Start: 2022-02-01 | End: 2022-02-04 | Stop reason: HOSPADM

## 2022-02-01 RX ORDER — DEXTROSE MONOHYDRATE 50 MG/ML
INJECTION, SOLUTION INTRAVENOUS CONTINUOUS
Status: DISCONTINUED | OUTPATIENT
Start: 2022-02-01 | End: 2022-02-02

## 2022-02-01 RX ADMIN — FAMOTIDINE 20 MG: 20 TABLET, FILM COATED ORAL at 08:31

## 2022-02-01 RX ADMIN — BUDESONIDE AND FORMOTEROL FUMARATE DIHYDRATE 1 PUFF: 80; 4.5 AEROSOL RESPIRATORY (INHALATION) at 18:40

## 2022-02-01 RX ADMIN — MICONAZOLE NITRATE: 2 POWDER TOPICAL at 11:30

## 2022-02-01 RX ADMIN — FLUOXETINE HYDROCHLORIDE 40 MG: 20 CAPSULE ORAL at 08:31

## 2022-02-01 RX ADMIN — FOLIC ACID 1 MG: 1 TABLET ORAL at 08:34

## 2022-02-01 RX ADMIN — BUDESONIDE AND FORMOTEROL FUMARATE DIHYDRATE 1 PUFF: 80; 4.5 AEROSOL RESPIRATORY (INHALATION) at 08:30

## 2022-02-01 RX ADMIN — ALLOPURINOL 100 MG: 100 TABLET ORAL at 08:31

## 2022-02-01 RX ADMIN — ACETAMINOPHEN 650 MG: 325 TABLET ORAL at 08:35

## 2022-02-01 RX ADMIN — Medication 30 ML: at 08:34

## 2022-02-01 RX ADMIN — CHLORHEXIDINE GLUCONATE 0.12% ORAL RINSE 15 ML: 1.2 LIQUID ORAL at 08:34

## 2022-02-01 RX ADMIN — DEXTROSE MONOHYDRATE: 50 INJECTION, SOLUTION INTRAVENOUS at 06:27

## 2022-02-01 RX ADMIN — MICONAZOLE NITRATE: 2 POWDER TOPICAL at 21:32

## 2022-02-01 RX ADMIN — ASPIRIN 81 MG CHEWABLE TABLET 81 MG: 81 TABLET CHEWABLE at 08:34

## 2022-02-01 RX ADMIN — Medication 1 CAPSULE: at 08:36

## 2022-02-01 RX ADMIN — DEXTROSE MONOHYDRATE: 50 INJECTION, SOLUTION INTRAVENOUS at 20:48

## 2022-02-01 RX ADMIN — SODIUM CHLORIDE, PRESERVATIVE FREE 10 ML: 5 INJECTION INTRAVENOUS at 08:54

## 2022-02-01 RX ADMIN — SODIUM CHLORIDE, PRESERVATIVE FREE 20 ML: 5 INJECTION INTRAVENOUS at 21:29

## 2022-02-01 ASSESSMENT — PAIN SCALES - GENERAL
PAINLEVEL_OUTOF10: 0

## 2022-02-01 ASSESSMENT — PAIN SCALES - WONG BAKER: WONGBAKER_NUMERICALRESPONSE: 0

## 2022-02-01 NOTE — PROGRESS NOTES
Units SubCUTAneous Weekly    [Held by provider] chlorothiazide (DIURIL) IVPB  500 mg IntraVENous Q12H    sodium chloride flush  5-40 mL IntraVENous 2 times per day    [Held by provider] enoxaparin  40 mg SubCUTAneous Daily      dextrose 75 mL/hr at 02/01/22 1211    sodium chloride       melatonin, docusate, hydrOXYzine, senna, potassium chloride, magnesium sulfate, sodium chloride, sodium chloride flush, ondansetron **OR** ondansetron, polyethylene glycol, acetaminophen **OR** acetaminophen, sodium chloride flush      LABS:     CBC:   Recent Labs     01/30/22  0945 01/31/22  0415 02/01/22  0443   WBC 16.4* 15.9* 15.6*   HGB 9.4* 9.4* 9.5*    438* 450*     BMP:    Recent Labs     01/30/22  0945 01/30/22  0945 01/30/22  2150 01/31/22  0415 01/31/22  1455 02/01/22  0443   *   < >  --  150* 153* 154*   K 2.9*   < > 4.3 4.4  --  4.2     --   --  105  --  109   CO2 29  --   --  32  --  33   BUN 88*  --   --  103*  --  102*   CREATININE 1.3*  --   --  1.6*  --  1.4*   GLUCOSE 90  --   --  125*  --  112*    < > = values in this interval not displayed. Calcium:  Recent Labs     02/01/22  0443   CALCIUM 9.6     Ionized Calcium:No results for input(s): IONCA in the last 72 hours. Magnesium:No results for input(s): MG in the last 72 hours. Phosphorus:No results for input(s): PHOS in the last 72 hours. BNP:No results for input(s): BNP in the last 72 hours. Glucose:No results for input(s): POCGLU in the last 72 hours. HgbA1C: No results for input(s): LABA1C in the last 72 hours. INR: No results for input(s): INR in the last 72 hours. Hepatic: No results for input(s): ALKPHOS, ALT, AST, PROT, BILITOT, BILIDIR, LABALBU in the last 72 hours. Amylase and Lipase:No results for input(s): LACTA, AMYLASE in the last 72 hours. Lactic Acid: No results for input(s): LACTA in the last 72 hours. Troponin: No results for input(s): CKTOTAL, CKMB, TROPONINI in the last 72 hours.   BNP: No results for input(s): BNP in the last 72 hours. CULTURES:   UA: No results for input(s): SPECGRAV, PHUR, COLORU, CLARITYU, MUCUS, PROTEINU, BLOODU, RBCUA, WBCUA, BACTERIA, NITRU, GLUCOSEU, BILIRUBINUR, UROBILINOGEN, KETUA, LABCAST, LABCASTTY, AMORPHOS in the last 72 hours. Invalid input(s): CRYSTALS  Micro:   Lab Results   Component Value Date    BC No growth-preliminary No growth  01/21/2022         IMAGING:         Problem list of patient:     Patient Active Problem List   Diagnosis Code    HTN (hypertension) I10    Chronic depression F32. A    CHF (congestive heart failure) ([facility]) I50.9    Thrombocytopenia ([facility]) D69.6    Moderate episode of recurrent major depressive disorder ([facility]) F33.1    Renal failure syndrome N19    Hyperkalemia E87.5    Isolated non-nephrotic proteinuria R80.0    ALVIN (acute kidney injury) ([facility] [address] N17.9    Chronic right-sided heart failure (HCC) I50.812    Pulmonary HTN ([facility]) I27.20    Hypotension due to hypovolemia I95.89, E86.1    Acute renal failure superimposed on stage 4 chronic kidney disease (HCC) N17.9, N18.4    Pressure injury of sacral region, stage 4 ([facility]) L89.154    Acute respiratory failure (HCC) J96.00    Flash pulmonary edema (HCC) J81.0    Shock (HCC) R57.9    Aspiration pneumonia of left lung (HCC) J69.0    Pleural effusion J90    Paroxysmal atrial fibrillation ([facility]) I48.0    Hemoptysis R04.2    Chronic respiratory failure with hypoxia (HCC) J96.11    Pneumothorax, right J93.9    Collapse of left lung J98.11    Abnormal chest x-ray R93.89    Acute on chronic respiratory failure with hypoxia (HCC) J96.21    Retroperitoneal hematoma K66.1    HAP (hospital-acquired pneumonia) J18.9, Y95         ASSESSMENT/PLAN   Stage 4 coccyx wound: post debridement, continue local wound care  Malnutrition  ANCA positive vasculites. Gall stones with dilated CBD, no obstruction  Left nephrostomy tube  Deconditioning  Continue current therapy  Plan for ECF.       James Real Luna Webb MD, MD, [facility] 2/1/2022 1:02 PM

## 2022-02-01 NOTE — PROGRESS NOTES
[address] [place] THERAPY  STRZ ICU STEPDOWN TELEMETRY 4K  DAILY NOTE    TIME   SLP Individual Minutes  Time In: 3607  Time Out: 9798  Minutes: 14  Timed Code Treatment Minutes: 0 Minutes       Date: 2022  Patient Name: Cozette Dance      CSN: [id number]   : 1950  (73 y.o.)  Gender: female   Referring Physician:  Dr. Michell Maya  Diagnosis: Acute on chronic respiratory failure with hypoxia   Secondary Diagnosis: Dysphagia   Precautions: Aspiration   Current Diet: NPO - TF via PEG  Swallowing Strategies: NPO + Oral Care   Date of Last MBS/FEES: Not Applicable    Pain:  No pain reported. Subjective:  NOAM Arambula approved completion of dysphagia tx this date. Upon arrival to room, patient awake in bed. Patient with adequate alertness; however, continued lethargy present. Short-Term Goals:  SHORT TERM GOAL #1:  Goal 1: Patient will consume PO trials of ice chips, thin liquids, and purees (as deemed clinically indicated) demonstrating consistency for pharyngeal swallow trigger, endurance, and stable RR to determine readiness for instrumental evaluation. INTERVENTIONS: ST completed po trials of ice chips following completion of oral care this date. Patient demonstrated evidence of good bolus acceptance, delayed oral initiation, slow/uncoordinated mastication, suspected decreased bolus control/containment, suspected delayed swallow initiation, delayed cough, and wet/gurgly vocal quality observed following ice chips x3. Patient with weak cough. ST suctioned; however, limited suctioned d/t inability to bring up residue/secreations. *recommend patient continues NPO with continuation of TF via PEG to meet nutrition/hydration needs. *following session, ST informed NOAM Arambula of patient requesting deep suctioning d/t poor secretion management; verbal receptiveness noted.        SHORT TERM GOAL #2:  Goal 2: Staff will exhibit return demonstration for completion of oral care to assist with maximizing oral integrity and to reduce potential bacteria colonization. INTERVENTIONS: ST completed oral care this date via hydrogen peroxide + water mixture via toothete. Patient with good acceptance of oral care this date. SHORT TERM GOAL #3:  Goal 3: Complete instrumental evaluation (FEES or MBS) when clinically appropriate in order to furthur evaluate pharyngeal function of the swallow and determine safety of PO intake and furthur goals/POC  INTERVENTIONS: Not indicated at this time. SHORT TERM GOAL #4:  Goal 4: Patient will complete oropharyngeal strengthening exercises (i.e. lingual lateralization/protrusion/circles, effortful swallows) x10 for improved strengthening/endurance needed for enhanced tolerance of PO intake. INTERVENTIONS:   Lingual Lateralizations: 5 reps completed with fair-poor success  *initial 2 reps good with progressing decreased ROM for remainder reps    SHORT TERM GOAL #5:  Goal 5: Complete cognitive/speech/language evaluation when clinically appropriate and determine goals and POC as appropriate. INTERVENTIONS: Not indicated at this time. Long-Term Goals:  No long term goals recommended d/t short ELOS              EDUCATION:  Learner: Patient  Education:  Reviewed results and recommendations of this evaluation, Reviewed signs, symptoms and risks of aspiration, Reviewed ST goals and Plan of Care, Education Related to Potential Risks and Complications Due to Impairment/Illness/Injury and Education Related to [employer] and Wellness  Evaluation of Education: [employer] understanding, Needs further instruction and Family not present    ASSESSMENT/PLAN:  Activity Tolerance:  Patient tolerance of  treatment: fair. Assessment/Plan: Patient progressing toward established goals. Continues to require skilled care of licensed speech pathologist to progress toward achievement of established goals and plan of care. .     Plan for Next Session: PO trials, OME, Oral care     [facility]) Adan Cornell M.A., [address]

## 2022-02-01 NOTE — PROGRESS NOTES
Pt lying in bed. Appears relaxed. States she does not want to be here. Respirations unlabored. Crackles noted in both lungs. Pt alert to person and time. pt able to follow commands. No fears or concerns voiced at this time.  Thom Bob [facility]

## 2022-02-01 NOTE — PROGRESS NOTES
Reported off via SBAR to [employer].  Ray Horvath [facility]

## 2022-02-01 NOTE — PROGRESS NOTES
[address] [facility] PHYSICAL THERAPY  DAILY NOTE  STRZ ICU STEPDOWN TELEMETRY 4K - [id number]-93/485-R      Time In: 5386  Time Out: 3081  Timed Code Treatment Minutes: 30 Minutes  Minutes: 30          Date: 2022  Patient Name: Alireza Cueva,  Gender:  female        MRN: [id number]  : 1950  (73 y.o.)     Referring Practitioner: Gil Humphreys MD  Diagnosis: Acute on chronic respiratory failure with hypoxia  Additional Pertinent Hx: Admitted  Due to hypoxic respiratory failure on [facility]. She had a prolonged hospitalization on on 10/27/21 to 21 after she was admitted due to respiratory failure. ahd a prolonged ICU stay. Had severe pneumonia with effusion required tracheostomy on 21 and decannulated on 21. 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 She was subsequently transferred to [place] on 21 and was back on 22 due to respiratory failure. She had  Loop colostomy for diversion. I was asked to see patient for the non healing coccyx wound. Pt is s/p colostomy . Prior Level of Function:  Lives With: Other (comment)  Type of Home: House  Home Layout: One level  Home Access: Level entry  Home Equipment: Rolling walker,Wheelchair-manual   Bathroom Shower/Tub: Tub/Shower unit  Bathroom Equipment: Grab bars in [facility] chair    ADL Assistance: [facility]: Needs assistance  Ambulation Assistance: Independent  Transfer Assistance: Independent  Active : Yes (prior to hospitaization)  Additional Comments: Brother reported pt has been using RW since  when had surgery/wound vac for sacral decubitus. Brother reported prior to surgery pt was walking 3,000 steps/day, although since surgery does minimal activity.   At [address], pt transferred to chair with [facility] lift, PT/OT had DC'd ~2 weeks prior to admission    Restrictions/Precautions:  Restrictions/Precautions: General Precautions,Fall Risk,Contact Precautions  Position Activity Restriction  Other position/activity restrictions: PEG tube, colostomy, sacral wound       SUBJECTIVE: nursing ok'd therapy- pt agreeable for therapy she cont to state she wanted to go home- pt coughing a lot and unable to clear her own secretions and needed to use suction several times during session     PAIN: no number given pt c/o of sore buttocks- did position pt on opp side following session     Vitals: Oxygen: pt on 1L O2 and O2 sats > 94%    OBJECTIVE:  Bed Mobility:  Rolling to Left: Maximum Assistance, with head of bed raised, with rail   Rolling to Right: Maximum Assistance, with head of bed raised, with rail   Supine to Sit: Maximum Assistance, with head of bed raised, she needed assist at LEs and at trunk   Sit to Supine: Maximum Assistance, of one and min assist of another and then dependent to boost up in bed    Scooting: Maximum Assistance, to edge of bed       Balance:  pt sat edge of bed with min to CGA for sitting balance pt needed an UE at support 100% of time, pt completed minimal reaching w/ right UE to use her suction but needed assist for this and increased assist for balance and needed max cues to lean forward due to post lean - occ stabilization at hips to hold in midline     Exercise:  Patient was guided in 1 set(s) 5 reps of exercise to both lower extremities. Long arc quads and AA heelslides, noted tightness in gerardo ankles and unable to stretch to neutral at end of session did float her heels with boots and placed on opp side for pressure relief . Exercises were completed for increased independence with functional mobility.     Functional Outcome Measures: Completed  AM-PAC Inpatient Mobility Raw Score : 7  AM-PAC Inpatient T-Scale Score : 26.42    ASSESSMENT:  Assessment: Pt cont to demonstrate generalized weakness and decreased balance she would benefit from cont skilled therapy   Activity Tolerance:  Patient tolerance of  treatment: fair. Equipment Recommendations:Equipment Needed: No  Discharge Recommendations: Subacte/Skilled Nursing Facility  Plan: Times per week: 3-5x GM  Current Treatment Recommendations: Strengthening,Neuromuscular Re-education,Balance Training,Endurance Training,Functional Mobility Training,Positioning,Patient/Caregiver Education & Training    Patient Education  Patient Education: Plan of Care    Goals:  Patient goals : plan to go to SNF for rehab  Short term goals  Time Frame for Short term goals: by discharge  Short term goal 1: Pt to transfer supine <--> sit min A to enable pt to get in/out of bed. Short term goal 2: Pt to sit EOB x 20 minutes with SBA for improved upright tolerance. Short term goal 3: PT to assess transfers when pt able. Long term goals  Time Frame for Long term goals : no LTGs set secondary to short ELOS    Following session, patient left in safe position with all fall risk precautions in place.

## 2022-02-01 NOTE — PROGRESS NOTES
[address] [facility] ICU STEPDOWN TELEMETRY 4K  Occupational Therapy  Daily Note  Time:   Time In:   Time Out: 1  Timed Code Treatment Minutes: 25 Minutes  Minutes: 25          Date: 2022  Patient Name: Jean Pierre Graff,   Gender: female      Room: [facility]/027-A  MRN: [id number]  : 1950  (73 y.o.)  Referring Practitioner: Kirk Bain. Christopher Quijano MD  Diagnosis: acute on chronic respiratory failure with hypoxia  Additional Pertinent Hx: per chart review; Due to hypoxic respiratory failure on 22 from [place]. She had a prolonged hospitalization on on 10/27/21 to 21 after she was admitted due to respiratory failure. ahd a prolonged ICU stay. Had severe pneumonia with effusion required tracheostomy on 21 and decannulated on 21. 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 She was subsequently transferred to [place] on 21 and was back on 22 due to respiratory failure. She had  Loop colostomy for diversion. I was asked to see patient for the non healing coccyx wound. Pt is s/p colostomy . Restrictions/Precautions:  Restrictions/Precautions: General Precautions,Fall Risk,Contact Precautions  Position Activity Restriction  Other position/activity restrictions: PEG tube, colostomy, sacral wound     SUBJECTIVE: RN approved OT session. Upon entering room pt supine in bed, agreeable to OT session. Pleasant and cooperative. PAIN: none reported     Vitals: Vitals not assessed per clinical judgement, see nursing flowsheet    COGNITION: Slow Processing, Decreased Recall and Decreased Insight   Requiring MAX verbal and tactile cues for theraband exercises     ADL:   Grooming: Stand By Assistance. following setup, supine in bed pt washing face. ADDITIONAL ACTIVITIES:  UB exercises performed to improve strength and endurance to increase indep in self care ADL routine.  Pt completed B KELVIN exs with mild resistance band x 10 reps, x 1 set, while following MAX verbal and tactile cues. Fair tolerance of exs, with minimal RBs throughout, and extended time due to decreased sequencing. ASSESSMENT:     Activity Tolerance:  Patient tolerance of  treatment: fair. Pt tolerated session well       Discharge Recommendations: Continue to assess pending progress  Equipment Recommendations: Equipment Needed: No  Plan: Times per week: 5x  Times per day: Daily  Current Treatment Recommendations: Rafa Norton Re-education,Patient/Caregiver Education & Training,Self-Care / ADL,Equipment Evaluation, Education, & procurement,Safety Education & Training    Patient Education  Patient Education: Role of OT, Plan of Care and ADL's    Goals  Short term goals  Time Frame for Short term goals: by discharge  Short term goal 1: OTR to assess all mobility and create goal as appropriate. Short term goal 2: Patient will toleate minimal resistive UB and  strengthening to increase ease with grooming and mobility. Short term goal 3: patient will complete simple grooming and bathing tasks with MIN A. Following session, patient left in safe position with all fall risk precautions in place.

## 2022-02-01 NOTE — PROGRESS NOTES
Comprehensive Nutrition Assessment    Type and Reason for Visit:  Reassess    Nutrition Recommendations/Plan:    Nepro to continue at 30ml/hr (goal). Bolus 1 -2.5ounce liquid protein bottle daily, Noticed it was administered  (1/31). Please advise re: free water flushes. Notice 300ml water flushes every 8 hrs per pump , 30ml free water TID  & 300ml water every 4 hrs in computer. Perfect serve not working. I spoke with RN who states she will clarify free water flush orders with MD.  (2/1) Na 154. NUTRITIONAL SUMMARY/STATUS: Pt. with no improvement from a nutritional standpoint AEB Na 154. Remains at risk for further nutritional compromise r/t admit with sepsis, UTI; intubated on 1/24; previous [facility] admit 10/27 - 12/1/21 for pneumonia then [facility] now back to [facility]; dysphagia d/t prolonged intubations - NPO at [address], SLP swallow eval (1/31) rec NPO,Hx HD (none since 12/23/21) and vent/trach (out now), wound debridements, nephrostomy tube, retroperitoneal hematoma, LOS day 8 and underlying medical condition CHF, obesity  Malnutrition Assessment:  Malnutrition Status:  No malnutrition    Context:  Chronic Illness     Findings of the 6 clinical characteristics of malnutrition:  Energy Intake:  No significant decrease in energy intake (EN at [place])  Weight Loss:  No significant weight loss     Body Fat Loss:  No significant body fat loss     Muscle Mass Loss:  No significant muscle mass loss    Fluid Accumulation:  1 - Mild     Strength:  Not Performed    Estimated Daily Nutrient Needs:  Energy (kcal):  ~8561-0392 kcal/day (18-20 kcal/kg); Weight Used for Energy Requirements:  Current (66 kg)     Protein (g):  50-84gms (1.2-2/kgm IBW) monitoring renal status; Weight Used for Protein Requirements:  Ideal (42kgm)        Fluid (ml/day):  per physician; Method Used for Fluid Requirements:         Nutrition Related Findings:  .   Treatments/Miscellaneous: Swallow eval 1/31) SLP rec NPO  GI Status: 425 ml colostomy op  Pertinent Labs: (2/1) Na 154, , Creatinine 1.4, Hemoglobin 9.5. Pertinent Meds: Meds: Iron 325, Folvite, Culturelle, MVI, Colace, Zofran, Glycolax. Wounds:  Pressure Injury,Stage IV,Surgical Incision,Unstageable (coccyx with possible osteomyelitis; debrided 8/16/21; unstageable right and left heels; nephrostomy tube; s/p colostomy on 1/27)       Current Nutrition Therapies:    Diet NPO  ADULT TUBE FEEDING; PEG; Renal Formula; Continuous; 30; No; 300; Q 4 hours  Current Tube Feeding (TF) Orders:  · Feeding Route: PEG  · Formula: Renal Formula (Nepro)  · Schedule: Continuous (goal of 30ml/hour)  · Additives/Modulars: Protein (1/21 bolus 1 2.5ounce liquid protein bottle daily)  · Water Flushes: per MD ( Spoke [facility] RN to clarify due to unable to perfectserve MD at this time, a ticket number was placed with IT)  · Current TF & Flush Orders Provides: TF to restart today per RN  · Goal TF & Flush Orders Provides: Nepro at 30ml/hour with 1 protein modular daily = 1378 kcals (1274 TF, 104 modular) , 84 gms protein (58 TF, 26 modular), 115gms cho, 18gms fiber,(523 free water from  TF)      Anthropometric Measures:  · Height: 4' 9\" (144.8 cm)  · Current Body Weight: 145 lb 8.1 oz (66 kg) (1/28; +1 pitting BLE; +1 non-pitting BLE)   · Admission Body Weight: 178 lb (80.7 kg) (1/20 with +2 edema)    · Usual Body Weight: 168 lb (76.2 kg) (10/27/21; 178# 4/5/21)     · Ideal Body Weight: 85 lbs;   · BMI: 31.5  · Adjusted Body Weight:  ;  (IBW for Ht. of 4'9\" is 93# or 42kgm)   ·    · BMI Categories: Obese Class 1 (BMI 30.0-34. 9)       Nutrition Diagnosis:   · Inadequate oral intake related to swallowing difficulty as evidenced by NPO or clear liquid status due to medical condition,intubation,nutrition support - enteral nutrition      Nutrition Interventions:   Food and/or Nutrient Delivery:  Continue NPO,Start Tube Feeding  Nutrition Education/Counseling:  Education initiated (Per RN, okay to reorder TF and restart TF today per MD. Discussed plan to restart TF with pt's brother at bedside.)   Coordination of Nutrition Care:  Continue to monitor while inpatient    Goals:  EN to provide % nutrient needs while intubated during LOS       Nutrition Monitoring and Evaluation:   Behavioral-Environmental Outcomes:  None Identified   Food/Nutrient Intake Outcomes:  Enteral Nutrition Intake/Tolerance  Physical Signs/Symptoms Outcomes:  Biochemical Data,Weight,Skin,Nutrition Focused Physical Findings,GI Status,Fluid Status or Edema,Hemodynamic Status     Discharge Planning:     Too soon to determine     Electronically signed by Pancho Serrano RD, LD on 2/1/22 at 3:37 PM EST    Contact: [phone]

## 2022-02-01 NOTE — FLOWSHEET NOTE
Pt was alone at the time of the visit. I made three stops, on two occasions were on patient's care, third visit, she was asleep. She blessed.       02/01/22 1517   Encounter Summary   Services provided to: Patient   Referral/Consult From: [place]   Continue Visiting Yes  (2/1 )   Complexity of Encounter Low   Length of Encounter 15 minutes   Routine   Type Follow up   Assessment Sleeping   Intervention Prayer

## 2022-02-01 NOTE — DISCHARGE INSTR - COC
Continuity of Care Form    Patient Name: Chetna Gramajo   :  1950  MRN:  [id number]    Admit date:  2022  Discharge date:  2022    Code Status Order: Full Code   Advance Directives:      Admitting Physician:  Haley Cline MD  PCP: Essence Westbrook MD    Discharging Nurse: HUNTER Briones8 [facility] Unit/Room#: 4K-27/027-A  Discharging Unit Phone Number: [phone]  Emergency Contact:   Extended Emergency Contact Information  Primary Emergency Contact: Benjamin Abrams of [address] Phone: [phone]  Mobile Phone: [phone]  Relation: Brother/Sister  Secondary Emergency Contact: Loan Magyar States of [address] Phone: [phone]  Mobile Phone: [phone]  Relation: Brother/Sister    Past Surgical History:  Past Surgical History:   Procedure Laterality Date    APPENDECTOMY  1960    BRONCHOSCOPY N/A 2021    BRONCHOSCOPY performed by Mali Yarbrough MD at  [facility]    BRONCHOSCOPY N/A 2021    BRONCHOSCOPY WITHOUT FLURO performed by Mali Yarbrough MD at  [facility]    COLECTOMY N/A 2022    LAPAROSCOPY WITH DIVERTING LOOP COLOSTOMY performed by Marcel Quintero MD at [address] [facility] N/A 2021    EGD PEG TUBE PLACEMENT performed by Catherine Garcia MD at  [facility]    IR [address]  2021    IR CHEST TUBE INSERTION 2021 [facility]    IR NEPHROSTOMY PERCUTANEOUS LEFT  2021    IR NEPHROSTOMY PERCUTANEOUS LEFT 2021 Jennie Ocampo MD [facility]    PRESSURE ULCER DEBRIDEMENT Right 2021    EXCISION RIGHT BUTTOCK WOUND AND CLOSURE performed by Claudetta Ra, MD at [facility]       Immunization History:   Immunization History   Administered Date(s) Administered    COVID-19, Pfizer Purple top, DILUTE for use, 12+ yrs, 30mcg/0.3mL dose 2021, 2021    Influenza, MDCK Quadv, IM, PF (Flucelvax 2 yrs and older) 10/15/2020    Influenza, Quadv, IM, (6 mo and older Fluzone, Flulaval, Fluarix and 3 yrs and older Afluria) 10/11/2017    Influenza, Quadv, IM, PF (6 mo and older Fluzone, Flulaval, Fluarix, and 3 yrs and older Afluria) 09/17/2019    Pneumococcal Conjugate 13-valent ([facility] [place]) 10/11/2017    Pneumococcal Polysaccharide ([id number]) 06/18/2019       Active Problems:  Patient Active Problem List   Diagnosis Code    HTN (hypertension) I10    Chronic depression F32. A    CHF (congestive heart failure) ([facility]) I50.9    Thrombocytopenia ([facility]) D69.6    Moderate episode of recurrent major depressive disorder ([facility]) F33.1    Renal failure syndrome N19    Hyperkalemia E87.5    Isolated non-nephrotic proteinuria R80.0    ALVIN (acute kidney injury) ([facility] [address] N17.9    Chronic right-sided heart failure ([facility]) I50.812    Pulmonary HTN ([facility]) I27.20    Hypotension due to hypovolemia I95.89, E86.1    Acute renal failure superimposed on stage 4 chronic kidney disease (HCC) N17.9, N18.4    Pressure injury of sacral region, stage 4 ([facility]) L89.154    Acute respiratory failure ([facility]) J96.00    Flash pulmonary edema ([facility]) J81.0    Shock ([facility]) R57.9    Aspiration pneumonia of left lung ([facility]) J69.0    Pleural effusion J90    Paroxysmal atrial fibrillation ([facility]) I48.0    Hemoptysis R04.2    Chronic respiratory failure with hypoxia ([facility]) J96.11    Pneumothorax, right J93.9    Collapse of left lung J98.11    Abnormal chest x-ray R93.89    Acute on chronic respiratory failure with hypoxia ([facility]) J96.21    Retroperitoneal hematoma K66.1    HAP (hospital-acquired pneumonia) J18.9, Y95       Isolation/Infection:   Isolation            Contact          Patient Infection Status       Infection Onset Added Last Indicated Last Indicated By Review Planned Expiration Resolved Resolved By    ESBL (Extended Spectrum Beta Lactamase) 01/19/22 01/22/22 01/19/22 Culture, Reflexed, Urine        Proteus urine 1/2022    Resolved    COVID-19 (Rule Out) 10/27/21 10/27/21 10/27/21 COVID-19, Rapid (Ordered)   10/27/21 Rule-Out Test Resulted            Nurse Assessment:  Last Vital Signs: /63   Pulse 88   Temp 99.4 °F (37.4 °C) (Rectal)   Resp 20   Ht 4' 9\" (1.448 m)   Wt 145 lb 8.1 oz (66 kg)   SpO2 97%   BMI 31.49 kg/m²     Last documented pain score (0-10 scale): Pain Level: 0  Last Weight:   Wt Readings from Last 1 Encounters:   01/28/22 145 lb 8.1 oz (66 kg)     Mental Status:  disoriented    IV Access:  - None    Nursing Mobility/ADLs:  Walking   Dependent  Transfer  Dependent  Bathing  Dependent  Dressing  Dependent  Toileting  Dependent  Feeding  Dependent  Med Admin  Dependent    Med Delivery   crushed    Wound Care Documentation and Therapy:  Wound 08/24/21 Sacrum DTI Coccyx (has been been debrided 8/6/21)  (Active)   Wound Etiology Pressure Unstageable 02/01/22 1214   Dressing Status New dressing applied;Clean;Dry; Intact 02/01/22 1214   Wound Cleansed Cleansed with saline 02/01/22 1214   Dressing/Treatment Dry dressing 02/01/22 1214   Offloading for Diabetic Foot Ulcers No offloading required 01/30/22 2000   Wound Length (cm) 4.4 cm 11/10/21 2000   Distance Tunneling (cm) 4 cm 01/29/22 2300   Tunneling Position ___ O'Clock 12 01/29/22 2300   Undermining Starts ___ O'Clock 12 01/29/22 2300   Undermining Ends___ O'Clock 3 01/29/22 2300   Wound Assessment Bleeding;Pink/red 02/01/22 1214   Drainage Amount Moderate 02/01/22 1214   Drainage Description Serosanguinous 02/01/22 1214   Odor None 02/01/22 1214   Katey-wound Assessment Excoriated;Fragile 02/01/22 1214   Margins Unattached edges 02/01/22 1214   Wound Thickness Description not for Pressure Injury Full thickness 01/29/22 2300   Number of days: 161       Wound 01/20/22 Heel Right unstagable pressure injury (Active)   Wound Etiology Pressure Unstageable 02/01/22 0759   Dressing Status Other (Comment) 01/31/22 1942   Wound Cleansed Soap and water 01/30/22 0030   Dressing/Treatment Open to air 02/01/22 Kirill Abdullahi [address] for Diabetic Foot Ulcers Offloading boot 02/01/22 0759   Wound Assessment Dry;Purple/maroon 02/01/22 0759   Drainage Amount None 02/01/22 0759   Odor None 02/01/22 0759   Katey-wound Assessment Fragile;Blanchable erythema 02/01/22 0759   Number of days: 11       Wound 01/20/22 Heel Left unstagable pressure injury (Active)   Wound Etiology Pressure Unstageable 02/01/22 0759   Dressing Status Other (Comment) 01/31/22 1942   Wound Cleansed Soap and water 01/30/22 0815   Dressing/Treatment Open to air 02/01/22 0759   Offloading for Diabetic Foot Ulcers Offloading boot 02/01/22 0759   Wound Assessment Dry;Purple/maroon 02/01/22 0759   Drainage Amount None 02/01/22 0759   Odor None 02/01/22 0759   Katey-wound Assessment Fragile;Blanchable erythema 02/01/22 0759   Number of days: 11        Elimination:  Continence: Bowel: Yes  Bladder: Yes  Urinary Catheter: yes  Colostomy/Ileostomy/Ileal Conduit: Yes  Colostomy LUQ Loop-Stomal Appliance: 1 piece,Changed  Colostomy LUQ Loop-Stoma  Assessment: Moist,Red  Colostomy LUQ Loop-Peristomal Assessment: Unable to assess  [REMOVED] Rectal Tube With balloon-Stool Appearance: Watery  Colostomy LUQ Loop-Stool Appearance: Loose  [REMOVED] Rectal Tube With balloon-Stool Color: Brown  Colostomy LUQ Loop-Stool Color: Brown  [REMOVED] Rectal Tube With balloon-Stool Amount: Smear  Colostomy LUQ Loop-Stool Amount: Small  Colostomy LUQ Loop-Output (mL): 50 ml    Date of Last BM: 02/04/2022    Intake/Output Summary (Last 24 hours) at 2/1/2022 1323  Last data filed at 2/1/2022 1211  Gross per 24 hour   Intake 1934.25 ml   Output 2115 ml   Net -180.75 ml     I/O last 3 completed shifts: In: 2111.2 [I.V.:140.2; NM/XK:2539; IV Piggyback:428]  Out: 5905 [Urine:2340; Stool:655]    Safety Concerns: At Risk for Falls    Impairments/Disabilities:      Intermittent confusion     Nutrition Therapy:  Current Nutrition Therapy:   Nepro @ 30.   At goal.  Flush with 300 of water three times daily  one protein shot daily     Routes of Feeding: PEG tube  Liquids: Free water flush   Daily Fluid Restriction: no  Last Modified Barium Swallow with Video (Video Swallowing Test): none  Treatments at the Time of Hospital Discharge:   Respiratory Treatments:   Oxygen Therapy:  is not on home oxygen therapy. Ventilator:    - No ventilator support    Rehab Therapies: Physical Therapy  Weight Bearing Status/Restrictions: No weight bearing restirctions  Other Medical Equipment (for information only, NOT a DME order):  bed bound   Other Treatments: change coccyx wound daily, Dakins soaked guaze cover with ABD and pink tape. Change colostomy as needed. Padilla care daily. Flush nephrostomy tube with 10 NS ml twice daily. ST-Eval and Treat     Patient's personal belongings (please select all that are sent with patient):  Glasses    RN SIGNATURE:  Electronically signed by Louis Langford RN on 2/4/22 at 11:24 AM EST    CASE MANAGEMENT/SOCIAL WORK SECTION    Inpatient Status Date: 1/20/2022    Readmission Risk Assessment Score:  Readmission Risk              Risk of Unplanned Readmission:  30           Discharging to Facility/ Agency   Name: [facility]oaib Kelley  Address: [place] [address]  Phone: [phone]  Fax: [phone]    Dialysis Facility (if applicable)   Name:  Address:  Dialysis Schedule:  Phone:  Fax:    / signature: Electronically signed by OSIEL Mancilla on 2/1/22 at 1:25 PM EST    PHYSICIAN SECTION    Prognosis: Fair    Condition at Discharge: Stable    Rehab Potential (if transferring to Rehab): Good    Recommended Labs or Other Treatments After Discharge: cbc    Physician Certification: I certify the above information and transfer of Media Mahesh  is necessary for the continuing treatment of the diagnosis listed and that she requires Bo Georges for greater 30 days.      Update Admission H&P: No change in H&P    PHYSICIAN SIGNATURE:  Electronically signed by Mima Lynch DO on 2/4/22 at 7:51 AM EST

## 2022-02-01 NOTE — PROGRESS NOTES
Hospitalist Progress Note    Patient:  [facility]psy [facility]      Unit/Bed:4K-27/027-A    YOB: 1950    MRN: [id number]       Acct: [de-identified]     PCP: Renee Berger MD    Date of Admission: 1/20/2022    Assessment/Plan:    1. Acute on chronic hypoxic respiratory failure: Intubated 1/24/2022 due to concerns patient was not protecting airway. Laparoscopy diverting transverse loop colostomy performed 1/27/2022. Patient weaned off pressors and was on minimum vent sedation/settings. Patient appropriately extubated, weaned down to 1-2 L nasal cannula. - Continue monitor for signs/symptoms of respiratory distress  - Wean oxygen as tolerated, maintain SpO2 above 90%    2. Hospital acquired pneumonia: Acquired while in [place]. Pneumonia panel positive for Proteus and Pseudomonas. Culture growing Candida, likely contaminant. Initiated on Zosyn for 2 days initially, transitioned over to meropenem. Sensitivities indicate appropriate antibiotic usage. ID consulted, appreciate recs. Meropenem discontinued on 2/1.     3. Hypernatremia: Likely secondary to dehydration, Na 150. Free water deficit 2.36L. Free water flushes initially 30mL q8hr. continue to monitor.              - Increased free water flushes to 240 mL q4hr   - Started D5W infusion              - Sodium check every 8 hours              - BMP in AM    4. UTI: Present on arrival. Previous culture ESBL producing. Patient was on meropenem 1/23-2/1.    5. Pulmonary hypertension: History of.  - Held Diuril, patient with 15L urinary output    6. Stage IV decubitus ulcer: General surgery consulted. Patient underwent diverting colostomy 1/27/2022. Wound care on board  - ID consult: Stopped abx, wound care    7. 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 Thrombocytosis, mild: Likely reactive in nature, platelet count stable  - Daily CBC    9. Left obstructive uropathy: Nephrostomy tube placed, changed 1/12/22.  Uro Transferred to [facility] 12/1/2021.      Per family noted decreased responsiveness and change in mental status over the last 2 days with vague complaint of back and arm pain.  An EKG was completed on 1/19/2022 with no acute ST changes.  And troponin evaluation revealed 0.176 and 0.155.  Patient became hypoxic and hypercapnic and was transitioned to BiPAP therapy.  1/20/2022 Dr. Schumacher Greek concern with sepsis and requested patient to be transferred to the ICU for further management and care. \"    Subjective (past 24 hours):   Patient seen and examined this morning. She is having thick secretions. She denies having any fevers, chills, chest pain, abdominal pain, or shortness of breath. Working on placement. ROS (12 point review of systems completed. Pertinent positives noted. Otherwise ROS is negative).     Medications:  Reviewed    Infusion Medications    dextrose 75 mL/hr at 02/01/22 1211    sodium chloride       Scheduled Medications    folic acid  1 mg Oral Daily    midodrine  10 mg Per G Tube Q8H    lactobacillus  1 capsule Oral Daily with breakfast    budesonide-formoterol  1 puff Inhalation BID    miconazole   Topical BID    multivitamin+  30 mL Oral Daily    allopurinol  100 mg Oral Daily    aspirin  81 mg Oral Daily    famotidine  20 mg PEG Tube Daily    FLUoxetine  40 mg Oral Daily    [Held by provider] Riociguat  2.5 mg Oral TID    sodium hypochlorite   Irrigation Daily    ferrous sulfate  325 mg Oral Q MWF    epoetin prince-epbx  4,000 Units SubCUTAneous Weekly    [Held by provider] chlorothiazide (DIURIL) IVPB  500 mg IntraVENous Q12H    sodium chloride flush  5-40 mL IntraVENous 2 times per day    [Held by provider] enoxaparin  40 mg SubCUTAneous Daily     PRN Meds: melatonin, docusate, hydrOXYzine, senna, potassium chloride, magnesium sulfate, sodium chloride, sodium chloride flush, ondansetron **OR** ondansetron, polyethylene glycol, acetaminophen **OR** acetaminophen, sodium chloride flush      Intake/Output Summary (Last 24 hours) at 2/1/2022 1818  Last data filed at 2/1/2022 1211  Gross per 24 hour   Intake 1661.04 ml   Output 1460 ml   Net 201.04 ml       Diet:  Diet NPO  ADULT TUBE FEEDING; PEG; Renal Formula; Continuous; 30; No; 240; Q 4 hours    Exam:  /77   Pulse 58   Temp 98.6 °F (37 °C) (Oral)   Resp 24   Ht 4' 9\" (1.448 m)   Wt 145 lb 8.1 oz (66 kg)   SpO2 95%   BMI 31.49 kg/m²     General appearance: No apparent distress, appears stated age and cooperative. HEENT: Pupils equal, round, and reactive to light. Conjunctivae/corneas clear. Neck: Supple, with full range of motion. No jugular venous distention. Trachea midline. Respiratory:  Normal respiratory effort. Clear to auscultation, bilaterally without Rales/Wheezes/Rhonchi. Cardiovascular: Regular rate and rhythm with normal S1/S2 without murmurs, rubs or gallops. Abdomen: Soft, non-tender, non-distended with normal bowel sounds. Musculoskeletal: passive and active ROM x 4 extremities. Skin: Skin color, texture, turgor normal.  No rashes or lesions. Neurologic:  Neurovascularly intact without any focal sensory/motor deficits.  Cranial nerves: II-XII intact, grossly non-focal.  Psychiatric: Alert and oriented, thought content appropriate, normal insight  Capillary Refill: Brisk,< 3 seconds   Peripheral Pulses: +2 palpable, equal bilaterally       Labs:   Recent Labs     01/30/22  0945 01/31/22  0415 02/01/22  0443   WBC 16.4* 15.9* 15.6*   HGB 9.4* 9.4* 9.5*   HCT 31.5* 33.5* 33.8*    438* 450*     Recent Labs     01/30/22  0945 01/30/22  0945 01/30/22  2150 01/31/22  0415 01/31/22  0415 01/31/22  1455 02/01/22  0443 02/01/22  1214   *   < >  --  150*   < > 153* 154* 153*   K 2.9*   < > 4.3 4.4  --   --  4.2  --      --   --  105  --   --  109  --    CO2 29  --   --  32  --   --  33  --    BUN 88*  --   --  103*  --   --  102*  --    CREATININE 1.3*  --   --  1.6*  --   --  1.4*  -- CALCIUM 7.9*  --   --  9.2  --   --  9.6  --     < > = values in this interval not displayed. No results for input(s): AST, ALT, BILIDIR, BILITOT, ALKPHOS in the last 72 hours. No results for input(s): INR in the last 72 hours. No results for input(s): Myrla Greulich in the last 72 hours. Microbiology:      Urinalysis:      Lab Results   Component Value Date    NITRU NEGATIVE 01/20/2022    WBCUA 50-75 01/20/2022    BACTERIA NONE SEEN 01/20/2022    RBCUA  01/20/2022    BLOODU LARGE 01/20/2022    SPECGRAV 1.014 01/20/2022    GLUCOSEU NEGATIVE 01/19/2022       Radiology:  MRI ABDOMEN WO CONTRAST MRCP   Final Result   1. Small 2 mm distal common bile duct stone with mild dilatation of the    common bile duct at 8 mm (upper normal 7 mm for patient's age). No    intrahepatic biliary dilatation. 2. Cholelithiasis without gallbladder wall edema. 3. 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   4. Left subcapsular renal hematoma measuring 2.2 cm in width with    extension to the left posterior paranephric spaces is seen on recent CT. Underlying left renal stones. 5. Benign right renal cysts. 6. Partially seen bilateral pleural effusions. Mild upper abdominal    ascites. This document has been electronically signed by: Soledad Ang MD on    01/26/2022 08:33 PM      CT HEAD WO CONTRAST   Final Result   1. No acute intracranial process. 2. Subcortical/periventricular white matter hypoattenuation statistically    representing changes of chronic microvascular ischemia. Global parenchymal    volume loss which is commensurate with reported age. This document has been electronically signed by: Shirlene Medellin DO on    01/24/2022 05:49 AM      All CTs at this facility use dose modulation techniques and iterative    reconstructions, and/or weight-based dosing   when appropriate to reduce radiation to a low as reasonably achievable. reconstructions, and/or weight-based dosing   when appropriate to reduce radiation to a low as reasonably achievable. CT ABDOMEN PELVIS WO CONTRAST Additional Contrast? None   Final Result   Impression:      Left perinephric and renal subcapsular hematomas      Loculated left retroperitoneal fluid, question hematoma versus abscess. Anasarca pattern noted with body wall edema      This document has been electronically signed by: Caitlyn Chahal MD on    01/20/2022 11:29 PM      All CTs at this facility use dose modulation techniques and iterative    reconstructions, and/or weight-based dosing   when appropriate to reduce radiation to a low as reasonably achievable. XR CHEST PORTABLE   Final Result   Impression:   1. Lines and tubes as above. 2. Changes of the pulmonary parenchyma concerning for mild bilateral    pneumonia. This document has been electronically signed by: Abbie Petersen MD on    01/20/2022 09:40 PM          DVT prophylaxis: [] Lovenox                                 [x] SCDs                                 [] SQ Heparin                                 [] Encourage ambulation           [] Already on Anticoagulation     Code Status: Full Code    PT/OT Eval Status:  Following    Tele:   [] yes             [] no    Electronically signed by Renan Quintanilla DO on 2/1/2022 at 6:18 PM

## 2022-02-01 NOTE — PROGRESS NOTES
LANE Meza - CNP  Urology Progress Note    Subjective: Eulalio Maria is a 73 y.o. female. s/p LAPAROSCOPY WITH DIVERTING LOOP COLOSTOMY on 1/20/2022 - 1/27/2022    His/Her current Diet is: Diet NPO  ADULT TUBE FEEDING; PEG; Renal Formula; Continuous; 30; No; 300; Q 4 hours. Since the previous note, the patient reports the following:  No acute issues overnight. No fevers or chills. No nausea or vomiting. No chest pain or shortness of breath. No calf pain. Pain Controlled. Left neph minimally draining, concentrated urine  Padilla catheter draining yellow urine  Our office will coordinate follow up      Vitals and Labs:  Patient Vitals for the past 24 hrs:   BP Temp Temp src Pulse Resp SpO2   02/01/22 1400 (!) 137/51 -- -- -- -- --   02/01/22 1215 121/63 -- -- 88 20 97 %   02/01/22 1049 -- 99.4 °F (37.4 °C) Rectal -- -- --   02/01/22 0944 -- 98.4 °F (36.9 °C) Axillary -- -- --   02/01/22 0830 -- -- -- -- -- 95 %   02/01/22 0759 125/70 100.4 °F (38 °C) Rectal 98 18 96 %   02/01/22 0612 (!) 159/69 -- -- -- -- --   02/01/22 0403 130/61 98.9 °F (37.2 °C) Oral 110 18 96 %   01/31/22 2305 138/60 97.7 °F (36.5 °C) Oral 92 15 95 %   01/31/22 2126 (!) 127/57 -- -- -- -- --   01/31/22 1942 102/66 97.9 °F (36.6 °C) Oral 88 20 96 %     I/O last 3 completed shifts:   In: 2111.2 [I.V.:140.2; NG/GT:1543; IV Piggyback:428]  Out: 8666 [Urine:2340; Stool:655]    Recent Labs     01/30/22  0945 01/31/22  0415 02/01/22 0443   WBC 16.4* 15.9* 15.6*   HGB 9.4* 9.4* 9.5*   HCT 31.5* 33.5* 33.8*   MCV 99.1* 101.5* 101.2*    438* 450*     Recent Labs     01/30/22  0945 01/30/22  0945 01/30/22  2150 01/31/22 0415 01/31/22  0415 01/31/22  1455 02/01/22  0443 02/01/22  1214   *   < >  --  150*   < > 153* 154* 153*   K 2.9*   < > 4.3 4.4  --   --  4.2  --      --   --  105  --   --  109  --    CO2 29  --   --  32  --   --  33  --    BUN 88*  --   --  103*  --   --  102*  --    CREATININE 1.3*  --   -- 1.6*  --   --  1.4*  --     < > = values in this interval not displayed. No results for input(s): COLORU, PHUR, LABCAST, WBCUA, RBCUA, MUCUS, TRICHOMONAS, YEAST, BACTERIA, CLARITYU, SPECGRAV, LEUKOCYTESUR, UROBILINOGEN, [place] in the last 72 hours. Invalid input(s): NITRATE, GLUCOSEUKETONESUAMORPHOUS    Physical Exam:  No acute distress. Awake, alert  Neck is supple  Regular rate and rhythm. Normal peripheral pulses  No accessory muscles of inspiration. Symmetric chest rise  Abdomen soft, non-tender, non-distended. No CVA tenderness. Colostomy present  No calf pain. Minimal/no edema in bilateral lower extremities. Skin is warm, dry  Psych: mood, affect normal    Additional Lab/Culture results:     Imaging Reviewed:     LANE Rollins CNP independently reviewed the images and verified the radiology reports from:    [facility]    Result Date: 1/21/2022  Transthoracic Echocardiography Report (TTE)  Demographics   Patient Name   Epi Bustamante Gender              Female   MR #           [id number]     Race                                                Ethnicity   Account #      [de-identified]     Room Number         0007   Accession      [id number]    Date of Study       01/21/2022  Number   Date of Birth  1950    Referring Physician Kathy Baker MD   Age            73 year(s)    [address]                                Interpreting        Echo reader of the week                               Physician           Leah Hawthorne MD  Procedure Type of Study   TTE procedure:ECHOCARDIOGRAM LIMITED. Procedure Date Date: 01/21/2022 Start: 08:30 AM Study Location: Bedside Technical Quality: Adequate visualization Indications:Hypoxia.  Additional Medical History:Osteoarthritis, Hypertension, Acute kidney injury, Pulmonary hypertension Patient Status: Routine Height: 57 inches Weight: 178 pounds BSA: 1.71 m^2 BMI: 38.52 kg/m^2 BP: 104/57 mmHg Allergies   - No Known Allergies.   - No Known Allergies. Conclusions   Summary  Normal left ventricle size and systolic function. Ejection fraction was  estimated at 60 %. There were no regional left ventricular wall motion  abnormalities and wall thickness was within normal limits. Signature   ----------------------------------------------------------------  Electronically signed by Xiomara Andrade MD (Interpreting  physician) on 01/21/2022 at 04:53 PM  ----------------------------------------------------------------   Findings   Mitral Valve  The mitral valve structure was normal with normal leaflet separation. Aortic Valve  The aortic valve was trileaflet with normal thickness and cuspal  separation. Tricuspid Valve  The tricuspid valve structure was normal with normal leaflet separation   Pulmonic Valve  The pulmonic valve leaflets exhibited normal thickness, no calcification,  and normal cuspal separation. Left Atrium  The left atrium is Mild to moderate dilated. Left Ventricle  Normal left ventricle size and systolic function. Ejection fraction was  estimated at 60 %. There were no regional left ventricular wall motion  abnormalities and wall thickness was within normal limits. Right Atrium  Right atrial size was normal.   Right Ventricle  The right ventricular size was normal with normal systolic function and  wall thickness. Pericardial Effusion  The pericardium was normal in appearance with no evidence of a pericardial  effusion. Pleural Effusion  No evidence of pleural effusion. Aorta / Great Vessels  -Aortic root dimension within normal limits.  -The Pulmonary artery is within normal limits. -IVC size is within normal limits with normal respiratory phasic changes.   M-Mode/2D Measurements & Calculations   LV Diastolic   LV Systolic Dimension:    AV Cusp Separation: 1 cmLA  Dimension: 5   3.6 cm                    Dimension: 3.3 cmAO Root  cm             LV Volume Diastolic: 928  Dimension: 3.1 cmLA Area: 25.8  LV FS:28 %     ml                        cm^2  LV PW          LV Volume Systolic: 44.9  Diastolic: 0.9 ml  cm             LV EDV/LV EDV Index: 118  Septum         ml/69 m^2LV ESV/LV ESV    RV Diastolic Dimension: 2.5 cm  Diastolic: 0.7 Index: 43.7 ml/32 m^2  cm             EF Calculated: 53.9 %     LA/Aorta: 1.06                                            LA volume/Index: 91.8 ml /54m^2                  LVOT: 1.8 cm  [email or web address] DocKey=[id number]    CT ABDOMEN PELVIS WO CONTRAST Additional Contrast? None    Result Date: 1/20/2022  CT abdomen and pelvis without contrast Comparison: None Findings: Degenerative change lumbar spine and hips. No acute bony abnormalities. Gastrostomy tube balloon mid stomach. Padilla catheter noted urinary bladder. Liver and spleen are unremarkable. Cholelithiasis. Pancreas and adrenal glands are within normal limits. Multiple left renal stones with left nephrostomy catheter. Subcapsular hematoma left kidney, not well assessed without contrast. Retroperitoneal hematoma also noted along with perinephric blood. Posterior left perinephric fluid is somewhat loculated. Abscess cannot be excluded. Left kidney displaced anteriorly. Right kidney unremarkable without stones or hydronephrosis. No evidence for aortic aneurysm. No free fluid or adenopathy is noted in the pelvis. No evidence for diverticulitis. Appendix not identified. Question partial hysterectomy. No adnexal abnormalities. Subcutaneous edema consistent with anasarca. Impression: Left perinephric and renal subcapsular hematomas Loculated left retroperitoneal fluid, question hematoma versus abscess.  Anasarca pattern noted with body wall edema This document has been electronically signed by: Waqas Mckinney MD on 01/20/2022 11:29 PM All CTs at this facility use dose modulation techniques and iterative reconstructions, and/or weight-based dosing when appropriate to reduce radiation to a low as reasonably achievable. CT CHEST WO CONTRAST    Result Date: 1/20/2022  CT chest without contrast Comparison: None Findings: Cardiomegaly with bilateral pleural effusions. Bilateral lower lobe atelectasis noted. Pulmonary edema is possible. No mediastinal or hilar adenopathy. Dense coronary artery calcifications. Gastrostomy tube noted in mid stomach. Heterogeneous enlarged thyroid, consider follow-up ultrasound. No significant focal bony abnormalities. Impression: Bilateral pleural effusions and atelectasis with cardiomegaly. Question pulmonary edema. This document has been electronically signed by: Waqas Mckinney MD on 01/20/2022 11:23 PM All CTs at this facility use dose modulation techniques and iterative reconstructions, and/or weight-based dosing when appropriate to reduce radiation to a low as reasonably achievable. IR FLUORO GUIDED CVA DEVICE PLMT/REPLACE/REMOVAL    Result Date: 1/20/2022  PICC LINE INSERTION WITH ULTRASOUND AND FLUOROSCOPIC GUIDANCE: CLINICAL INFORMATION: 63-year-old female with renal failure, respiratory failure, sepsis. Poor intravenous access. PERFORMED BY: Kvng Scott. Bret Iniguez M.D. APPROACH:  Right Internal Jugular Vein, ultrasound guidance, micropuncture technique. CATHETER: 6 Western Suzi triple lumen power PICC CATHETER LENGTH: 15 cm CATHETER TIP:  Right Atrium FLUOROSCOPY TIME: 1 minute 9 seconds FLUOROSCOPIC IMAGES: 1 PROCEDURE:  Signed informed consent was obtained prior to performing this procedure. The patient was not sedated during this procedure.  Following local anesthesia and utilizing MAXIMAL STERILE BARRIER TECHNIQUE, the vein listed above was punctured with a micropuncture needle, utilizing ultrasound guidance, and a sonographic image was obtained for confirmation of needle position and vessel patency. .  A .018 wire was passed through this needle, followed by insertion of a 4 Western Suzi dilator. A peel-away sheath of the appropriate size was then advanced over the 018 wire. The PICC line was cut to the appropriate length and then advanced through the peel-away sheath, and the sheath was removed. This was all performed with fluoroscopic guidance. A spot film of the chest was obtained to document appropriate catheter position. The lumens of the catheter were flushed with saline, and positive pressure caps were applied. The hub of the catheter was then stabilized to the skin with 3-0 silk sutures and a sterile op-site dressing was applied. Status post successful PICC line insertion. **This report has been created using voice recognition software. It may contain minor errors which are inherent in voice recognition technology. ** Final report electronically signed by Dr Neptali Foss on 1/20/2022 5:30 PM    US GALLBLADDER RUQ    Result Date: 1/21/2022  US abdomen limited Comparison: None Findings: Study was limited by abdominal bandages. Liver normal size and echotexture. Right lobe 18.3 cm length. Pancreas partially obscured, no definite abnormality. Cholelithiasis entire gallbladder lumen without wall thickening. Common duct 14 mm diameter. No ultrasonographic Joseph sign. Impression: Cholelithiasis considerable ductal dilation This document has been electronically signed by: Neetu Escudero MD on 01/21/2022 01:43 AM    XR CHEST PORTABLE    Result Date: 1/20/2022  Single view chest Comparison: 12/7/2021 Findings: Current study shows a right IJ central line with its tip at the proximal right atrium. Tracheostomy and dual lumen left central line appear to have been removed. Catheter of unknown utility projects over the left upper quadrant. Multiple extrinsic leads and tubes are also noted.  The heart size appears slightly improved with only mild cardiomegaly now evident. There is a small pleural effusion on the left which is obscuring the left hemidiaphragm with mild blunting at the costophrenic sulci. Lungs are hypoventilated bilaterally. There is faint infiltrate at the right upper lobe centrally and at the right medial base. The medial left upper lobe also shows patchy somewhat nodular infiltrate and atelectasis. Atheromatous calcifications are redemonstrated the aortic arch. The bony structures appear to be intact without obvious change from comparison. There is no pneumothorax. Impression: 1. Lines and tubes as above. 2. Changes of the pulmonary parenchyma concerning for mild bilateral pneumonia. This document has been electronically signed by: Ariane Meraz MD on 01/20/2022 09:40 PM    VL DUP LOWER EXTREMITY VENOUS BILATERAL    Result Date: 1/21/2022  PROCEDURE: VL DUP LOWER EXTREMITY VENOUS BILATERAL CLINICAL INFORMATION: Evaluation for DVT in a 82-year-old female. COMPARISON: No prior study. TECHNIQUE: Venous doppler ultrasound was performed of the bilateral lower extremities using gray scale, color flow and spectral doppler imaging. FINDINGS: There is normal color flow, spectral analysis and compressibility of the common femoral vein, superficial femoral vein and popliteal vein bilaterally . There is normal color flow and compressibility in the posterior tibial veins, anterior tibial veins and peroneal veins. No evidence of a DVT. **This report has been created using voice recognition software. It may contain minor errors which are inherent in voice recognition technology. ** Final report electronically signed by Dr Ruddy Melendrez on 1/21/2022 1:43 PM      Impression:    Patient Active Problem List   Diagnosis    HTN (hypertension)    Chronic depression    CHF (congestive heart failure) (HCC)    Thrombocytopenia (HCC)    Moderate episode of recurrent major depressive disorder ([facility] [address]    Renal failure syndrome    Hyperkalemia    Isolated non-nephrotic proteinuria    ALVIN (acute kidney injury) ([facility] [address]    Chronic right-sided heart failure (HCC)    Pulmonary HTN (HCC)    Hypotension due to hypovolemia    Acute renal failure superimposed on stage 4 chronic kidney disease (HCC)    Pressure injury of sacral region, stage 4 (HCC)    Acute respiratory failure (HCC)    Flash pulmonary edema (HCC)    Shock (HCC)    Aspiration pneumonia of left lung (HCC)    Pleural effusion    Paroxysmal atrial fibrillation (HCC)    Hemoptysis    Chronic respiratory failure with hypoxia (HCC)    Pneumothorax, right    Collapse of left lung    Abnormal chest x-ray    Acute on chronic respiratory failure with hypoxia (HCC)    Retroperitoneal hematoma    HAP (hospital-acquired pneumonia)       s/p LAPAROSCOPY WITH DIVERTING LOOP COLOSTOMY on 1/20/2022 - 1/27/2022    Plan:    Low grade temp this am, could consider re-imaging to evaluate hematoma vs abscess    L perinephric and renal subcapsular hematoma vs abscess--Fluid collection not large enough for drain placement. If fever/worsening clinical condition/infection symptoms consider re-imaging and possibly needle aspiration. L staghorn calculus--left neph tube exchanged 1/18/22--plan to treat stone outpatient once medically optimized  Klebsiella UTI - ID on board  Hematuria--Nearly resolved. Padilla light yellow clear urine. L neph tube with concentrated urine.   Flush every 8 hrs  Anemia--HGB 9.5 today  CKD--baseline creatinine 1.7-2.3 since december  Sepsis  Acute on chronic respiratory failue  Stage IV large decubitus ulcer - diverting colostomy 1/27/22     Will follow peripherally     LANE Hardin - CNP  2:52 PM 2/1/2022

## 2022-02-01 NOTE — PROGRESS NOTES
Call received from patients sister Gentry Alexandra. Updated on patient status and current plan of care. All questions answered at this time.

## 2022-02-01 NOTE — CARE COORDINATION
DISCHARGE PLANNING UPDATE    Barriers to Discharge:   From E2 (ventilator there). Pneumonia/Retroperitoneal Hematoma/UTI. 1/18 Left Nephrostomy Tube Changed. 1/27 Diverting Ostomy for nonhealing coccyx wound. IV AB, IVF, Oxygen 1L continued.  Na+ 154; Dietician will review TF options, free water; collaborated brian Youngblood    Discharge Plan:   from [place] (7 weeks there); prefers new possible SNF ([facility]) for ostomy teaching, coccyx wound care; SW following (AB due to stop); therapy following, has chronic PEG (11/24)

## 2022-02-01 NOTE — CARE COORDINATION
2/1/22, 1:03 PM EST  DISCHARGE PLANNING EVALUATION    SW called and left voicemail for [employer] at [facility] and requested a call back. DOT called Chase at the [facility] and he reported that they are still reviewing but should have an answer soon. 2/1/22, 1:16 PM EST  DISCHARGE PLANNING EVALUATION    SW received a call from [employer] at [facility] and she reported that they are not able to accept patient at this time due to their staffing. DOT received a call from Jeannie Goodwin at [facility] and they can accept patient at discharge. DOT called and updated sister Hola Bonner that [facility] denied due to staffing and [facility] accepted patient at discharge. Hola Bonner confirmed plan to discharge to [facility] at discharge.

## 2022-02-02 LAB
ANION GAP SERPL CALCULATED.3IONS-SCNC: 10 MEQ/L (ref 8–16)
BASOPHILS # BLD: 0.8 %
BASOPHILS ABSOLUTE: 0.1 THOU/MM3 (ref 0–0.1)
BUN BLDV-MCNC: 93 MG/DL (ref 7–22)
CALCIUM SERPL-MCNC: 9.5 MG/DL (ref 8.5–10.5)
CHLORIDE BLD-SCNC: 103 MEQ/L (ref 98–111)
CO2: 32 MEQ/L (ref 23–33)
CREAT SERPL-MCNC: 1.2 MG/DL (ref 0.4–1.2)
EOSINOPHIL # BLD: 3.1 %
EOSINOPHILS ABSOLUTE: 0.5 THOU/MM3 (ref 0–0.4)
ERYTHROCYTE [DISTWIDTH] IN BLOOD BY AUTOMATED COUNT: 16 % (ref 11.5–14.5)
ERYTHROCYTE [DISTWIDTH] IN BLOOD BY AUTOMATED COUNT: 60 FL (ref 35–45)
GFR SERPL CREATININE-BSD FRML MDRD: 44 ML/MIN/1.73M2
GLUCOSE BLD-MCNC: 122 MG/DL (ref 70–108)
HCT VFR BLD CALC: 32.7 % (ref 37–47)
HEMOGLOBIN: 9.2 GM/DL (ref 12–16)
HYPOCHROMIA: PRESENT
IMMATURE GRANS (ABS): 0.09 THOU/MM3 (ref 0–0.07)
IMMATURE GRANULOCYTES: 0.6 %
LYMPHOCYTES # BLD: 9 %
LYMPHOCYTES ABSOLUTE: 1.4 THOU/MM3 (ref 1–4.8)
MCH RBC QN AUTO: 28.8 PG (ref 26–33)
MCHC RBC AUTO-ENTMCNC: 28.1 GM/DL (ref 32.2–35.5)
MCV RBC AUTO: 102.2 FL (ref 81–99)
MONOCYTES # BLD: 5.7 %
MONOCYTES ABSOLUTE: 0.9 THOU/MM3 (ref 0.4–1.3)
NUCLEATED RED BLOOD CELLS: 0 /100 WBC
PLATELET # BLD: 418 THOU/MM3 (ref 130–400)
PMV BLD AUTO: 9.7 FL (ref 9.4–12.4)
POTASSIUM SERPL-SCNC: 4.1 MEQ/L (ref 3.5–5.2)
RBC # BLD: 3.2 MILL/MM3 (ref 4.2–5.4)
SARS-COV-2, NAAT: NOT  DETECTED
SEG NEUTROPHILS: 80.8 %
SEGMENTED NEUTROPHILS ABSOLUTE COUNT: 12.7 THOU/MM3 (ref 1.8–7.7)
SODIUM BLD-SCNC: 145 MEQ/L (ref 135–145)
SODIUM BLD-SCNC: 148 MEQ/L (ref 135–145)
WBC # BLD: 15.7 THOU/MM3 (ref 4.8–10.8)

## 2022-02-02 PROCEDURE — 94640 AIRWAY INHALATION TREATMENT: CPT

## 2022-02-02 PROCEDURE — 85025 COMPLETE CBC W/AUTO DIFF WBC: CPT

## 2022-02-02 PROCEDURE — 6370000000 HC RX 637 (ALT 250 FOR IP): Performed by: STUDENT IN AN ORGANIZED HEALTH CARE EDUCATION/TRAINING PROGRAM

## 2022-02-02 PROCEDURE — 84295 ASSAY OF SERUM SODIUM: CPT

## 2022-02-02 PROCEDURE — 99233 SBSQ HOSP IP/OBS HIGH 50: CPT | Performed by: INTERNAL MEDICINE

## 2022-02-02 PROCEDURE — 2580000003 HC RX 258: Performed by: NURSE PRACTITIONER

## 2022-02-02 PROCEDURE — 2060000000 HC ICU INTERMEDIATE R&B

## 2022-02-02 PROCEDURE — 6370000000 HC RX 637 (ALT 250 FOR IP): Performed by: INTERNAL MEDICINE

## 2022-02-02 PROCEDURE — 80048 BASIC METABOLIC PNL TOTAL CA: CPT

## 2022-02-02 PROCEDURE — 6360000002 HC RX W HCPCS: Performed by: INTERNAL MEDICINE

## 2022-02-02 PROCEDURE — 94760 N-INVAS EAR/PLS OXIMETRY 1: CPT

## 2022-02-02 PROCEDURE — 97530 THERAPEUTIC ACTIVITIES: CPT

## 2022-02-02 PROCEDURE — 87635 SARS-COV-2 COVID-19 AMP PRB: CPT

## 2022-02-02 PROCEDURE — 36415 COLL VENOUS BLD VENIPUNCTURE: CPT

## 2022-02-02 PROCEDURE — 2580000003 HC RX 258: Performed by: STUDENT IN AN ORGANIZED HEALTH CARE EDUCATION/TRAINING PROGRAM

## 2022-02-02 PROCEDURE — 6370000000 HC RX 637 (ALT 250 FOR IP): Performed by: NURSE PRACTITIONER

## 2022-02-02 PROCEDURE — 99233 SBSQ HOSP IP/OBS HIGH 50: CPT | Performed by: UROLOGY

## 2022-02-02 PROCEDURE — 2700000000 HC OXYGEN THERAPY PER DAY

## 2022-02-02 RX ORDER — MULTIVIT/FOLIC ACID/HERBAL 275 400-200/30
30 LIQUID (ML) ORAL DAILY
Qty: 480 ML | Refills: 1 | DISCHARGE
Start: 2022-02-03

## 2022-02-02 RX ORDER — IPRATROPIUM BROMIDE AND ALBUTEROL SULFATE 2.5; .5 MG/3ML; MG/3ML
1 SOLUTION RESPIRATORY (INHALATION) EVERY 4 HOURS
Qty: 360 ML | DISCHARGE
Start: 2022-02-02

## 2022-02-02 RX ORDER — LANOLIN ALCOHOL/MO/W.PET/CERES
6 CREAM (GRAM) TOPICAL NIGHTLY PRN
Qty: 45 TABLET | Refills: 0 | DISCHARGE
Start: 2022-02-02

## 2022-02-02 RX ORDER — FERROUS SULFATE 325(65) MG
325 TABLET ORAL
Qty: 30 TABLET | Refills: 3 | DISCHARGE
Start: 2022-02-02

## 2022-02-02 RX ORDER — LACTOBACILLUS RHAMNOSUS GG 10B CELL
1 CAPSULE ORAL
Qty: 30 CAPSULE | Refills: 0 | DISCHARGE
Start: 2022-02-03

## 2022-02-02 RX ORDER — DEXTROSE MONOHYDRATE 50 MG/ML
INJECTION, SOLUTION INTRAVENOUS CONTINUOUS
Status: DISCONTINUED | OUTPATIENT
Start: 2022-02-02 | End: 2022-02-03

## 2022-02-02 RX ORDER — FOLIC ACID 1 MG/1
1 TABLET ORAL DAILY
Qty: 30 TABLET | Refills: 3 | DISCHARGE
Start: 2022-02-03

## 2022-02-02 RX ORDER — CIPROFLOXACIN 250 MG/1
250 TABLET, FILM COATED ORAL
Status: DISCONTINUED | OUTPATIENT
Start: 2022-02-02 | End: 2022-02-04 | Stop reason: HOSPADM

## 2022-02-02 RX ORDER — CIPROFLOXACIN 250 MG/1
250 TABLET, FILM COATED ORAL DAILY
Qty: 30 TABLET | Refills: 0 | DISCHARGE
Start: 2022-02-03 | End: 2022-03-05

## 2022-02-02 RX ORDER — POLYETHYLENE GLYCOL 3350 17 G/17G
17 POWDER, FOR SOLUTION ORAL DAILY PRN
Qty: 527 G | Refills: 1 | DISCHARGE
Start: 2022-02-02 | End: 2022-03-04

## 2022-02-02 RX ADMIN — BUDESONIDE AND FORMOTEROL FUMARATE DIHYDRATE 1 PUFF: 80; 4.5 AEROSOL RESPIRATORY (INHALATION) at 08:17

## 2022-02-02 RX ADMIN — MIDODRINE HYDROCHLORIDE 10 MG: 5 TABLET ORAL at 14:01

## 2022-02-02 RX ADMIN — FOLIC ACID 1 MG: 1 TABLET ORAL at 09:23

## 2022-02-02 RX ADMIN — FAMOTIDINE 20 MG: 20 TABLET, FILM COATED ORAL at 09:34

## 2022-02-02 RX ADMIN — FERROUS SULFATE TAB 325 MG (65 MG ELEMENTAL FE) 325 MG: 325 (65 FE) TAB at 15:17

## 2022-02-02 RX ADMIN — DEXTROSE MONOHYDRATE: 50 INJECTION, SOLUTION INTRAVENOUS at 16:20

## 2022-02-02 RX ADMIN — FLUOXETINE HYDROCHLORIDE 40 MG: 20 CAPSULE ORAL at 09:23

## 2022-02-02 RX ADMIN — HYDROXYZINE HYDROCHLORIDE 25 MG: 25 TABLET, FILM COATED ORAL at 15:16

## 2022-02-02 RX ADMIN — MICONAZOLE NITRATE: 2 POWDER TOPICAL at 09:24

## 2022-02-02 RX ADMIN — DEXTROSE MONOHYDRATE: 50 INJECTION, SOLUTION INTRAVENOUS at 14:40

## 2022-02-02 RX ADMIN — ASPIRIN 81 MG CHEWABLE TABLET 81 MG: 81 TABLET CHEWABLE at 09:23

## 2022-02-02 RX ADMIN — MIDODRINE HYDROCHLORIDE 10 MG: 5 TABLET ORAL at 09:23

## 2022-02-02 RX ADMIN — ENOXAPARIN SODIUM 40 MG: 100 INJECTION SUBCUTANEOUS at 14:01

## 2022-02-02 RX ADMIN — ACETAMINOPHEN 650 MG: 325 TABLET ORAL at 09:23

## 2022-02-02 RX ADMIN — BUDESONIDE AND FORMOTEROL FUMARATE DIHYDRATE 1 PUFF: 80; 4.5 AEROSOL RESPIRATORY (INHALATION) at 17:42

## 2022-02-02 RX ADMIN — SODIUM CHLORIDE, PRESERVATIVE FREE 10 ML: 5 INJECTION INTRAVENOUS at 20:34

## 2022-02-02 RX ADMIN — ALLOPURINOL 100 MG: 100 TABLET ORAL at 09:23

## 2022-02-02 RX ADMIN — Medication 1 CAPSULE: at 09:23

## 2022-02-02 RX ADMIN — DAKIN'S SOLUTION 0.125% (QUARTER STRENGTH): 0.12 SOLUTION at 09:24

## 2022-02-02 RX ADMIN — MIDODRINE HYDROCHLORIDE 10 MG: 5 TABLET ORAL at 20:33

## 2022-02-02 RX ADMIN — MICONAZOLE NITRATE: 2 POWDER TOPICAL at 20:34

## 2022-02-02 RX ADMIN — CIPROFLOXACIN 250 MG: 250 TABLET, FILM COATED ORAL at 14:01

## 2022-02-02 RX ADMIN — Medication 30 ML: at 09:24

## 2022-02-02 RX ADMIN — SODIUM CHLORIDE, PRESERVATIVE FREE 10 ML: 5 INJECTION INTRAVENOUS at 09:22

## 2022-02-02 ASSESSMENT — PAIN SCALES - GENERAL
PAINLEVEL_OUTOF10: 3
PAINLEVEL_OUTOF10: 0

## 2022-02-02 NOTE — PROGRESS NOTES
Patient underwent diverting colostomy 1/27/2022. Wound care on board  - ID consult: Stopped meropenem, wound care    8. 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 Thrombocytosis, mild: Likely reactive in nature, platelet count stable  - Daily CBC    10. Left obstructive uropathy: Nephrostomy tube placed, changed 1/12/22. Urology consulted, no further intervention.   - I's and O's    11. CKD: Cr stable. Urine output adequate  -  I's and O's  - Will continue to monitor    12. Macrocytic anemia: Hgb stable   - Continue weekly retacrit    13. Gout: History of.  - Continue allopurinol    14. Dilated CBD: US gallbladder demonstrated CBD dilation of 14mm. MRCP with contrast reported small stone in bile duct with 8mm dilatation. GI consulted, no plans for acute intervention    15. BARBER: History of. Patient off of ventilator and tolerating nasal cannula   - Follow-up outpatient    16. Obesity: BMI 31.49       Expected discharge date:  TBD    Disposition:    [] Home       [] TCU       [] Rehab       [] Psych       [x] SNF       [] [place]       [] Other-    Chief Complaint: Shortness of Breath    Hospital Course:   Per HPI: \"Kenzie Diop is a 81-year-old  female transferred to [facility] on 1/20/2022 with acute respiratory failure, hypoxia.  Patient is currently on a BiPAP.     Patient has past medical history significant for everyday smoker, BARBER, PAF s/p cardioversion 11/3/2021, PAH grp 3, HFpEF-ECHO 10/2021 LV 60 to 65% with trace tricuspid regurg.  RV with normal function, essential hypertension, depression, gout, osteoarthritis, renal calculi, thrombocytopenia, stage 3 sacral ulcer s/p wound ostomy.   Recent [facility] hospitalization 10/27-12/1/2021 patient suffered a very lengthy hospitalization secondary to acute hypoxic and hypercapnic respiratory failure felt to be secondary to severe pneumonia with left-sided pleural effusion and repeated mucous plugging.  Patient did require tracheostomy on 11/17/2021, and de cannulated 12/23/2021.  Stage IV decubitus ulcer with suspected osteomyelitis, ALVIN likely ATN, ANCA associated Vasculitis, hydronephrosis secondary to left-sided staghorn calculi percutaneous tube was placed on 11/12/2021, bilateral pleural effusions felt to be transudative status post thoracentesis of 1 L on 12/1/2021. Transferred to [facility] 12/1/2021.      Per family noted decreased responsiveness and change in mental status over the last 2 days with vague complaint of back and arm pain.  An EKG was completed on 1/19/2022 with no acute ST changes.  And troponin evaluation revealed 0.176 and 0.155.  Patient became hypoxic and hypercapnic and was transitioned to BiPAP therapy.  1/20/2022 Dr. Pallavi Kapoorns concern with sepsis and requested patient to be transferred to the ICU for further management and care. \"    Subjective (past 24 hours):   Patient seen and examined this morning. She had thick secretions overnight. Today she states she is doing better. She was working with OT before I examined her. She is currently breathing 93 to 94% on room air. She denies having any fevers, chills, chest pain, abdominal pain, or shortness of breath. ROS (12 point review of systems completed. Pertinent positives noted. Otherwise ROS is negative).     Medications:  Reviewed    Infusion Medications    dextrose 75 mL/hr at 02/02/22 1440    sodium chloride       Scheduled Medications    enoxaparin  40 mg SubCUTAneous Q24H    ciprofloxacin  250 mg Oral Daily    folic acid  1 mg Oral Daily    midodrine  10 mg Per G Tube Q8H    lactobacillus  1 capsule Oral Daily with breakfast    budesonide-formoterol  1 puff Inhalation BID    miconazole   Topical BID    multivitamin+  30 mL Oral Daily    allopurinol  100 mg Oral Daily    aspirin  81 mg Oral Daily    famotidine  20 mg PEG Tube Daily    FLUoxetine  40 mg Oral Daily    [Held by provider] Riociguat  2.5 mg Oral TID    sodium hypochlorite   Irrigation Daily    ferrous sulfate  325 mg Oral Q MWF    epoetin prince-epbx  4,000 Units SubCUTAneous Weekly    sodium chloride flush  5-40 mL IntraVENous 2 times per day     PRN Meds: melatonin, docusate, hydrOXYzine, senna, potassium chloride, magnesium sulfate, sodium chloride, sodium chloride flush, ondansetron **OR** ondansetron, polyethylene glycol, acetaminophen **OR** acetaminophen, sodium chloride flush      Intake/Output Summary (Last 24 hours) at 2/2/2022 1503  Last data filed at 2/2/2022 1401  Gross per 24 hour   Intake 3149.75 ml   Output 1360 ml   Net 1789.75 ml       Diet:  Diet NPO  ADULT TUBE FEEDING; PEG; Renal Formula; Continuous; 30; No; 240; Q 4 hours    Exam:  BP (!) 132/55   Pulse 98   Temp 98.8 °F (37.1 °C) (Oral)   Resp 18   Ht 4' 9\" (1.448 m)   Wt 145 lb 8.1 oz (66 kg)   SpO2 92%   BMI 31.49 kg/m²     General appearance: No apparent distress, appears stated age and cooperative. HEENT: Pupils equal, round, and reactive to light. Conjunctivae/corneas clear. Neck: Supple, with full range of motion. No jugular venous distention. Trachea midline. Respiratory:  Normal respiratory effort. Clear to auscultation, bilaterally without Rales/Wheezes/Rhonchi. Cardiovascular: Regular rate and rhythm with normal S1/S2 without murmurs, rubs or gallops. Abdomen: Soft, non-tender, non-distended with normal bowel sounds. Musculoskeletal: passive and active ROM x 4 extremities. Skin: Skin color, texture, turgor normal.  No rashes or lesions. Neurologic:  Neurovascularly intact without any focal sensory/motor deficits.  Cranial nerves: II-XII intact, grossly non-focal.  Psychiatric: Alert and oriented, thought content appropriate, normal insight  Capillary Refill: Brisk,< 3 seconds   Peripheral Pulses: +2 palpable, equal bilaterally       Labs:   Recent Labs     01/31/22  0415 02/01/22  0443 02/02/22  0328   WBC 15.9* 15.6* 15.7*   HGB 9.4* 9.5* 9.2*   HCT 33.5* 33.8* 32.7*   PLT 438* 450* 418*     Recent Labs     01/31/22  0415 01/31/22  1455 02/01/22  0443 02/01/22  1214 02/01/22  1953 02/02/22  0328 02/02/22  1253   *   < > 154*   < > 150* 145 148*   K 4.4  --  4.2  --   --  4.1  --      --  109  --   --  103  --    CO2 32  --  33  --   --  32  --    *  --  102*  --   --  93*  --    CREATININE 1.6*  --  1.4*  --   --  1.2  --    CALCIUM 9.2  --  9.6  --   --  9.5  --     < > = values in this interval not displayed. No results for input(s): AST, ALT, BILIDIR, BILITOT, ALKPHOS in the last 72 hours. No results for input(s): INR in the last 72 hours. No results for input(s): John Anne in the last 72 hours. Microbiology:      Urinalysis:      Lab Results   Component Value Date    NITRU NEGATIVE 01/20/2022    WBCUA 50-75 01/20/2022    BACTERIA NONE SEEN 01/20/2022    RBCUA  01/20/2022    BLOODU LARGE 01/20/2022    SPECGRAV 1.014 01/20/2022    GLUCOSEU NEGATIVE 01/19/2022       Radiology:  MRI ABDOMEN WO CONTRAST MRCP   Final Result   1. Small 2 mm distal common bile duct stone with mild dilatation of the    common bile duct at 8 mm (upper normal 7 mm for patient's age). No    intrahepatic biliary dilatation. 2. Cholelithiasis without gallbladder wall edema. 3. 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   4. Left subcapsular renal hematoma measuring 2.2 cm in width with    extension to the left posterior paranephric spaces is seen on recent CT. Underlying left renal stones. 5. Benign right renal cysts. 6. Partially seen bilateral pleural effusions. Mild upper abdominal    ascites. This document has been electronically signed by: Ivonne Sher MD on    01/26/2022 08:33 PM      CT HEAD WO CONTRAST   Final Result   1. No acute intracranial process. 2. Subcortical/periventricular white matter hypoattenuation statistically    representing changes of chronic microvascular ischemia. Global parenchymal    volume loss which is commensurate with reported age. This document has been electronically signed by: Palak Bradford DO on    01/24/2022 05:49 AM      All CTs at this facility use dose modulation techniques and iterative    reconstructions, and/or weight-based dosing   when appropriate to reduce radiation to a low as reasonably achievable. XR CHEST PORTABLE   Final Result   ET tube 2.5 cm above the avelina. This document has been electronically signed by: Melissa Damon MD on 01/24/2022 04:51 AM      XR CHEST PORTABLE   Final Result   Improving heart failure. This document has been electronically signed by: Melissa Damon MD on 01/24/2022 12:38 AM      XR CHEST PORTABLE   Final Result   1. Stable cardiomegaly with pulmonary vascular congestion suspicious for congestive heart failure. 2. Small left-sided pleural effusion. 3. Bilateral atelectasis/infiltrate. **This report has been created using voice recognition software. It may contain minor errors which are inherent in voice recognition technology. **      Final report electronically signed by Dr. Omar Neff on 1/23/2022 10:39 AM      XR CHEST PORTABLE   Final Result   Impression:   Cardiomegaly with pulmonary vascular congestion and bilateral pleural    effusions, similar to prior study. This document has been electronically signed by: Clementine Maxwell MD on    01/22/2022 04:39 AM      VL DUP LOWER EXTREMITY VENOUS BILATERAL   Final Result   No evidence of a DVT. **This report has been created using voice recognition software. It may contain minor errors which are inherent in voice recognition technology. **      Final report electronically signed by Dr Maricarmen Cortes on 1/21/2022 1:43 PM      US GALLBLADDER RUQ   Final Result   Impression:      Cholelithiasis considerable ductal dilation      This document has been electronically signed by: Dano Persaud MD on    01/21/2022 01:43 AM      CT CHEST WO CONTRAST   Final Result   Impression:      Bilateral pleural effusions and atelectasis with cardiomegaly. Question pulmonary edema. This document has been electronically signed by: Hieu Fay MD on    01/20/2022 11:23 PM      All CTs at this facility use dose modulation techniques and iterative    reconstructions, and/or weight-based dosing   when appropriate to reduce radiation to a low as reasonably achievable. CT ABDOMEN PELVIS WO CONTRAST Additional Contrast? None   Final Result   Impression:      Left perinephric and renal subcapsular hematomas      Loculated left retroperitoneal fluid, question hematoma versus abscess. Anasarca pattern noted with body wall edema      This document has been electronically signed by: Hieu Fay MD on    01/20/2022 11:29 PM      All CTs at this facility use dose modulation techniques and iterative    reconstructions, and/or weight-based dosing   when appropriate to reduce radiation to a low as reasonably achievable. XR CHEST PORTABLE   Final Result   Impression:   1. Lines and tubes as above. 2. Changes of the pulmonary parenchyma concerning for mild bilateral    pneumonia. This document has been electronically signed by: Tesfaye Werner MD on    01/20/2022 09:40 PM          DVT prophylaxis: [] Lovenox                                 [x] SCDs                                 [] SQ Heparin                                 [] Encourage ambulation           [] Already on Anticoagulation     Code Status: Full Code    PT/OT Eval Status:  Following    Tele:   [] yes             [] no    Electronically signed by Karoline Brown DO on 2/2/2022 at 3:03 PM

## 2022-02-02 NOTE — PROGRESS NOTES
[id number] [place] [id number]  PHYSICAL THERAPY MISSED TREATMENT NOTE  ACUTE CARE  [facility] ICU STEPDOWN TELEMETRY 4K          Missed Treatment:  Attempted PT treatment this date, however pt refused due to upset stomach. Encouraged pt to perform supine therex, however pt continued to refuse. Will check back as able per POC.

## 2022-02-02 NOTE — PROGRESS NOTES
LANE Dunn - CNP  Urology Progress Note    Subjective: Sandra Wilson is a 73 y.o. female. s/p LAPAROSCOPY WITH DIVERTING LOOP COLOSTOMY on 1/20/2022 - 1/27/2022    His/Her current Diet is: Diet NPO  ADULT TUBE FEEDING; PEG; Renal Formula; Continuous; 30; No; 240; Q 4 hours. Since the previous note, the patient reports the following:  No acute issues overnight. No fevers or chills. No nausea or vomiting. No chest pain or shortness of breath. No calf pain. Pain Controlled. Nurse reported leaking while flushing left neph, able to flush at bedside with ease without leakage - nurse present  Padilla catheter draining yellow urine  Our office will coordinate follow up      Vitals and Labs:  Patient Vitals for the past 24 hrs:   BP Temp Temp src Pulse Resp SpO2   02/02/22 1200 (!) 132/55 98.8 °F (37.1 °C) Oral 98 18 90 %   02/02/22 0817 -- -- -- -- -- 92 %   02/02/22 0800 123/71 98.6 °F (37 °C) Oral 94 17 93 %   02/02/22 0351 123/61 -- -- 105 16 92 %   02/02/22 0111 103/71 97.9 °F (36.6 °C) Oral 101 16 93 %   02/01/22 2145 137/61 -- -- -- -- --   02/01/22 2115 (!) 106/55 -- -- -- -- --   02/01/22 1945 130/88 97.9 °F (36.6 °C) Oral 106 20 96 %   02/01/22 1630 137/77 -- -- -- -- --   02/01/22 1600 105/62 98.6 °F (37 °C) Oral 58 24 95 %   02/01/22 1400 (!) 137/51 -- -- -- -- --     I/O last 3 completed shifts:   In: 3722.7 [I.V.:1625.7; NG/GT:2097]  Out: 1667 [Urine:2305; Stool:415]    Recent Labs     01/31/22  0415 02/01/22  0443 02/02/22  0328   WBC 15.9* 15.6* 15.7*   HGB 9.4* 9.5* 9.2*   HCT 33.5* 33.8* 32.7*   .5* 101.2* 102.2*   * 450* 418*     Recent Labs     01/31/22  0415 01/31/22  1455 02/01/22 0443 02/01/22 0443 02/01/22  1214 02/01/22 1953 02/02/22 0328   *   < > 154*   < > 153* 150* 145   K 4.4  --  4.2  --   --   --  4.1     --  109  --   --   --  103   CO2 32  --  33  --   --   --  32   *  --  102*  --   --   --  93*   CREATININE 1.6*  --  1.4*  -- --   --  1.2    < > = values in this interval not displayed. No results for input(s): COLORU, PHUR, LABCAST, WBCUA, RBCUA, MUCUS, TRICHOMONAS, YEAST, BACTERIA, CLARITYU, SPECGRAV, LEUKOCYTESUR, UROBILINOGEN, Mimi Dove in the last 72 hours. Invalid input(s): NITRATE, GLUCOSEUKETONESUAMORPHOUS    Physical Exam:  No acute distress. Awake, alert  Neck is supple  Regular rate and rhythm. Normal peripheral pulses  No accessory muscles of inspiration. Symmetric chest rise  Abdomen soft, non-tender, non-distended. No CVA tenderness. Colostomy present  No calf pain. Minimal/no edema in bilateral lower extremities. Skin is warm, dry  Psych: mood, affect normal    Additional Lab/Culture results:     Imaging Reviewed:     LANE Larios CNP independently reviewed the images and verified the radiology reports from:    [facility]    Result Date: 1/21/2022  Transthoracic Echocardiography Report (TTE)  Demographics   Patient Name   Kameron Polk Gender              Female   MR #           [id number]     Race                                                Ethnicity   Account #      [de-identified]     Room Number         0007   Accession      [id number]    Date of Study       01/21/2022  Number   Date of Birth  1950    Referring Physician Brennen Walter MD   Age            73 year(s)    Tacho Polk, [address] [place] [address]                                Interpreting        Echo reader of the week                               Physician           Humphrey Vasquez MD  Procedure Type of Study   TTE procedure:ECHOCARDIOGRAM LIMITED. Procedure Date Date: 01/21/2022 Start: 08:30 AM Study Location: Bedside Technical Quality: Adequate visualization Indications:Hypoxia.  Additional Medical History:Osteoarthritis, Hypertension, Acute kidney injury, Pulmonary hypertension Patient Status: Routine Height: 57 inches Weight: 178 pounds BSA: 1.71 m^2 BMI: 38.52 kg/m^2 BP: 104/57 mmHg Allergies   - No Known Allergies.   - No Known Allergies. Conclusions   Summary  Normal left ventricle size and systolic function. Ejection fraction was  estimated at 60 %. There were no regional left ventricular wall motion  abnormalities and wall thickness was within normal limits. Signature   ----------------------------------------------------------------  Electronically signed by Ezio Arango MD (Interpreting  physician) on 01/21/2022 at 04:53 PM  ----------------------------------------------------------------   Findings   Mitral Valve  The mitral valve structure was normal with normal leaflet separation. Aortic Valve  The aortic valve was trileaflet with normal thickness and cuspal  separation. Tricuspid Valve  The tricuspid valve structure was normal with normal leaflet separation   Pulmonic Valve  The pulmonic valve leaflets exhibited normal thickness, no calcification,  and normal cuspal separation. Left Atrium  The left atrium is Mild to moderate dilated. Left Ventricle  Normal left ventricle size and systolic function. Ejection fraction was  estimated at 60 %. There were no regional left ventricular wall motion  abnormalities and wall thickness was within normal limits. Right Atrium  Right atrial size was normal.   Right Ventricle  The right ventricular size was normal with normal systolic function and  wall thickness. Pericardial Effusion  The pericardium was normal in appearance with no evidence of a pericardial  effusion. Pleural Effusion  No evidence of pleural effusion. Aorta / Great Vessels  -Aortic root dimension within normal limits.  -The Pulmonary artery is within normal limits. -IVC size is within normal limits with normal respiratory phasic changes.   M-Mode/2D Measurements & Calculations   LV Diastolic   LV Systolic Dimension:    AV Cusp Separation: 1 cmLA Dimension: 5   3.6 cm                    Dimension: 3.3 cmAO Root  cm             LV Volume Diastolic: 360  Dimension: 3.1 cmLA Area: 25.8  LV FS:28 %     ml                        cm^2  LV PW          LV Volume Systolic: 81.4  Diastolic: 0.9 ml  cm             LV EDV/LV EDV Index: 118  Septum         ml/69 m^2LV ESV/LV ESV    RV Diastolic Dimension: 2.5 cm  Diastolic: 0.7 Index: 86.5 ml/32 m^2  cm             EF Calculated: 53.9 %     LA/Aorta: 1.06                                            LA volume/Index: 91.8 ml /54m^2                  LVOT: 1.8 cm  [email or web address] [id number]    CT ABDOMEN PELVIS WO CONTRAST Additional Contrast? None    Result Date: 1/20/2022  CT abdomen and pelvis without contrast Comparison: None Findings: Degenerative change lumbar spine and hips. No acute bony abnormalities. Gastrostomy tube balloon mid stomach. Padilla catheter noted urinary bladder. Liver and spleen are unremarkable. Cholelithiasis. Pancreas and adrenal glands are within normal limits. Multiple left renal stones with left nephrostomy catheter. Subcapsular hematoma left kidney, not well assessed without contrast. Retroperitoneal hematoma also noted along with perinephric blood. Posterior left perinephric fluid is somewhat loculated. Abscess cannot be excluded. Left kidney displaced anteriorly. Right kidney unremarkable without stones or hydronephrosis. No evidence for aortic aneurysm. No free fluid or adenopathy is noted in the pelvis. No evidence for diverticulitis. Appendix not identified. Question partial hysterectomy. No adnexal abnormalities. Subcutaneous edema consistent with anasarca. Impression: Left perinephric and renal subcapsular hematomas Loculated left retroperitoneal fluid, question hematoma versus abscess.  Anasarca pattern noted with body wall edema This document has been electronically signed by: Papito Hamilton MD on 01/20/2022 11:29 PM All CTs at this facility use dose modulation techniques and iterative reconstructions, and/or weight-based dosing when appropriate to reduce radiation to a low as reasonably achievable. CT CHEST WO CONTRAST    Result Date: 1/20/2022  CT chest without contrast Comparison: None Findings: Cardiomegaly with bilateral pleural effusions. Bilateral lower lobe atelectasis noted. Pulmonary edema is possible. No mediastinal or hilar adenopathy. Dense coronary artery calcifications. Gastrostomy tube noted in mid stomach. Heterogeneous enlarged thyroid, consider follow-up ultrasound. No significant focal bony abnormalities. Impression: Bilateral pleural effusions and atelectasis with cardiomegaly. Question pulmonary edema. This document has been electronically signed by: Papito Hamilton MD on 01/20/2022 11:23 PM All CTs at this facility use dose modulation techniques and iterative reconstructions, and/or weight-based dosing when appropriate to reduce radiation to a low as reasonably achievable. IR FLUORO GUIDED CVA DEVICE PLMT/REPLACE/REMOVAL    Result Date: 1/20/2022  PICC LINE INSERTION WITH ULTRASOUND AND FLUOROSCOPIC GUIDANCE: CLINICAL INFORMATION: 74-year-old female with renal failure, respiratory failure, sepsis. Poor intravenous access. PERFORMED BY: Christiano Luo. Frankie Farias M.D. APPROACH:  Right Internal Jugular Vein, ultrasound guidance, micropuncture technique. CATHETER: 6 Western Suzi triple lumen power PICC CATHETER LENGTH: 15 cm CATHETER TIP:  Right Atrium FLUOROSCOPY TIME: 1 minute 9 seconds FLUOROSCOPIC IMAGES: 1 PROCEDURE:  Signed informed consent was obtained prior to performing this procedure. The patient was not sedated during this procedure.  Following local anesthesia and utilizing MAXIMAL STERILE BARRIER TECHNIQUE, the vein listed above was punctured with a micropuncture needle, utilizing ultrasound guidance, and a sonographic image was obtained for confirmation of needle position and vessel patency. .  A .018 wire was passed through this needle, followed by insertion of a 4 Western Suzi dilator. A peel-away sheath of the appropriate size was then advanced over the 018 wire. The PICC line was cut to the appropriate length and then advanced through the peel-away sheath, and the sheath was removed. This was all performed with fluoroscopic guidance. A spot film of the chest was obtained to document appropriate catheter position. The lumens of the catheter were flushed with saline, and positive pressure caps were applied. The hub of the catheter was then stabilized to the skin with 3-0 silk sutures and a sterile op-site dressing was applied. Status post successful PICC line insertion. **This report has been created using voice recognition software. It may contain minor errors which are inherent in voice recognition technology. ** Final report electronically signed by Dr Chavez Molina on 1/20/2022 5:30 PM    US GALLBLADDER RUQ    Result Date: 1/21/2022  US abdomen limited Comparison: None Findings: Study was limited by abdominal bandages. Liver normal size and echotexture. Right lobe 18.3 cm length. Pancreas partially obscured, no definite abnormality. Cholelithiasis entire gallbladder lumen without wall thickening. Common duct 14 mm diameter. No ultrasonographic Joseph sign. Impression: Cholelithiasis considerable ductal dilation This document has been electronically signed by: Fernie Seymour MD on 01/21/2022 01:43 AM    XR CHEST PORTABLE    Result Date: 1/20/2022  Single view chest Comparison: 12/7/2021 Findings: Current study shows a right IJ central line with its tip at the proximal right atrium. Tracheostomy and dual lumen left central line appear to have been removed. Catheter of unknown utility projects over the left upper quadrant. Multiple extrinsic leads and tubes are also noted.  The heart size appears slightly improved with only mild cardiomegaly now evident. There is a small pleural effusion on the left which is obscuring the left hemidiaphragm with mild blunting at the costophrenic sulci. Lungs are hypoventilated bilaterally. There is faint infiltrate at the right upper lobe centrally and at the right medial base. The medial left upper lobe also shows patchy somewhat nodular infiltrate and atelectasis. Atheromatous calcifications are redemonstrated the aortic arch. The bony structures appear to be intact without obvious change from comparison. There is no pneumothorax. Impression: 1. Lines and tubes as above. 2. Changes of the pulmonary parenchyma concerning for mild bilateral pneumonia. This document has been electronically signed by: Monet Licea MD on 01/20/2022 09:40 PM    VL DUP LOWER EXTREMITY VENOUS BILATERAL    Result Date: 1/21/2022  PROCEDURE: VL DUP LOWER EXTREMITY VENOUS BILATERAL CLINICAL INFORMATION: Evaluation for DVT in a 79-year-old female. COMPARISON: No prior study. TECHNIQUE: Venous doppler ultrasound was performed of the bilateral lower extremities using gray scale, color flow and spectral doppler imaging. FINDINGS: There is normal color flow, spectral analysis and compressibility of the common femoral vein, superficial femoral vein and popliteal vein bilaterally . There is normal color flow and compressibility in the posterior tibial veins, anterior tibial veins and peroneal veins. No evidence of a DVT. **This report has been created using voice recognition software. It may contain minor errors which are inherent in voice recognition technology. ** Final report electronically signed by Dr Silva Vaughan on 1/21/2022 1:43 PM      Impression:    Patient Active Problem List   Diagnosis    HTN (hypertension)    Chronic depression    CHF (congestive heart failure) (HCC)    Thrombocytopenia (HCC)    Moderate episode of recurrent major depressive disorder ([facility] [address]    Renal failure syndrome    Hyperkalemia    Isolated non-nephrotic proteinuria    ALVIN (acute kidney injury) ([facility] [address]    Chronic right-sided heart failure (HCC)    Pulmonary HTN (HCC)    Hypotension due to hypovolemia    Acute renal failure superimposed on stage 4 chronic kidney disease (HCC)    Pressure injury of sacral region, stage 4 (HCC)    Acute respiratory failure (HCC)    Flash pulmonary edema (HCC)    Shock (HCC)    Aspiration pneumonia of left lung (HCC)    Pleural effusion    Paroxysmal atrial fibrillation (HCC)    Hemoptysis    Chronic respiratory failure with hypoxia (HCC)    Pneumothorax, right    Collapse of left lung    Abnormal chest x-ray    Acute on chronic respiratory failure with hypoxia (HCC)    Retroperitoneal hematoma    HAP (hospital-acquired pneumonia)       s/p LAPAROSCOPY WITH DIVERTING LOOP COLOSTOMY on 1/20/2022 - 1/27/2022    Plan:    Afebrile overnight, she is feeling well  Able to flush left neph with leakage - nurse present    L perinephric and renal subcapsular hematoma vs abscess--Fluid collection not large enough for drain placement. If fever/worsening clinical condition/infection symptoms consider re-imaging and possibly needle aspiration. L staghorn calculus--left neph tube exchanged 1/18/22--plan to treat stone outpatient once medically optimized  Klebsiella UTI - ID on board  Hematuria--resolved. Padilla light yellow clear urine. L neph tube with concentrated urine.   Flush every 8 hrs  Anemia--HGB 9.2 today  CKD--baseline creatinine 1.7-2.3 since december  Sepsis  Acute on chronic respiratory failue  Stage IV large decubitus ulcer - diverting colostomy 1/27/22     Will follow peripherally     LANE Yarbrough - CNP  1:05 PM 2/2/2022

## 2022-02-02 NOTE — PROGRESS NOTES
Physician notified when flushing nephrostomy tube there is moderate drainage that then comes from the site, some does drain through the tube. Urology replied to hold off on flushes till seen.

## 2022-02-02 NOTE — PROGRESS NOTES
[facility] THERAPY MISSED TREATMENT NOTE  STRZ ICU STEPDOWN TELEMETRY 4K      Date: 2022  Patient Name: Cozette Dance        MRN: [id number]    : 1950  (73 y.o.)    REASON FOR MISSED TREATMENT:  Attempted to see patient for completion of dysphagia tx; upon attempt at 36 patient unavailable d/t nursing needs.  Will re-attempt later this date as schedule permits or     Per chart review, \"patient with potential plans to discharge; ST recommending continued NPO + all nutrition/hydration via PEG tube + ongoing skilled dysphagia tx.      Mckenzie Forrest M.S. [address]

## 2022-02-02 NOTE — PROGRESS NOTES
EN/PN NUTRITION SUPPORT    RECOMMENDATIONS/PLAN:   Continue Nepro 30ml/hr. (goal)  Continue Proteinex bolus 2.5 ounces daily. Increase free water flushes from 240ml every 4 hrs to 300ml free water every 4 hrs per Dr Kanu Talbot (2/2/22)  Floor nurse not available, called charge nurse Meenu re: free water flush increase. Rec weigh pt. CURRENT NUTRITION THERAPIES  Diet NPO  ADULT TUBE FEEDING; PEG; Renal Formula; Continuous; 30; No; 300; Q 4 hours  Current Tube Feeding (TF) Orders:  · Feeding Route: PEG  · Formula: Renal Formula (Nepro)  · Schedule: Continuous (goal of 30ml/hour)  · Additives/Modulars: Protein (1/21 bolus  2.5ounce liquid protein bottle daily)  · Water Flushes: 300ml free water every 4 hrs per Dr Kanu Talbot  (2/2/22)( 1800ml free water flushes/24 hrs)  ·   · Goal TF & Flush Orders Provides: Nepro 30ml/hr + 1 proteinex 2.5 ounces daily yields 1378kcals (1274TF, 104 modular), 84 grams protein (58 TF, 26 modular), 115 grams CHO, 18 grams fiber, 1963ml water ( 1440ml water flushes, 523ml TF) +  additional 75ml from  modular, 2235ml total volume( 720ml TF, 75 modular, 1440 water flushes)/24 hrs      COMPARATIVE STANDARDS  Energy (kcal):  ~4215-2481 kcal/day (18-20 kcal/kg); Weight Used for Energy Requirements:  Current (66 kg)     Protein (g):  50-84gms (1.2-2/kgm IBW) monitoring renal status; Weight Used for Protein Requirements:  Ideal (42kgm)        Fluid (ml/day):  per physician;   NUTRITIONAL SUMMARY/STATUS: Pt. slightly  improving from a nutritional standpoint AEB Na 148 (2/2) improved from (2/1) 154.   Remains at risk for further nutritional compromise r/t admit with sepsis, UTI; intubated on 1/24; previous [facility] admit 10/27 - 12/1/21 for pneumonia then [facility] [place] now back to [facility]; dysphagia d/t prolonged intubations - NPO at [address], SLP swallow eval (1/31) rec NPO,Hx HD (none since 12/23/21) and vent/trach (out now), wound debridements, nephrostomy tube, retroperitoneal hematoma, LOS day 8 and underlying medical condition CHF, obesity    NUTRITION RELATED FINDINGS:   Treatments: Swallow eval (1/31) SLP rec NPO  TF Status: TF at goal,  However pt is thirsty & Dr Kelly Omer  Just approved 300ml free water flushes every 4 hrs. GI Status: 240ml colostomy op noted (2/1-2/2), 100ml colostomy op (2/2)  Pertinent Labs: Na 148 (2/2)   Pertinent Meds: reviewed  Current Weight: 145 lb 8.1 oz (66 kg) ((1/28/22;+1 pitting BLE;+1 non-pitting  BLE))  Admission Weight:  178 lb (80.7 kg) (1/20 with +2 edema)  Wounds:  (coccyx with possible osteomyelitis; debrided 8/16/21; unstageable  rt & left heels; nephrostomy tube , stage IV large decubitus ulver -diverting colostomy (1/27/22), skin tear back left;upper, unstageable sacrum  DTI coccyx, incision abdomen lower medial)  Malnutrition Status: No malnutrition    Please refer to initial nutrition assessment for additional details.    Rissa Rocha RD, LD:    Contact Number: [phone]

## 2022-02-02 NOTE — CARE COORDINATION
2/2/22, 10:12 AM EST  DISCHARGE PLANNING EVALUATION    SW received a call from Valentino Hauser at [facility] and she reported that she would like notified right away if patient is discharging today. SW will let her know. 2/2/22, 12:47 PM EST  DISCHARGE PLANNING EVALUATION    SW called [facility] and they reported that transportation is booked and they will have patient on list if there is a cancellation. Patient may be able to get transport at 4pm or 6:30pm. [facility] to let SW know. DOT called sister Darline Siu and updated her on transportation today or tomorrow. DOT called [facility] and made Chase aware of transportation and potential discharge today at 4pm or 6:30pm.    RN made aware. Attending made aware. 2/2/22, 2:25 PM EST  DISCHARGE PLANNING EVALUATION    SW was notified that patient will not discharge today. DOT updated [facility] and sister Darline Siu. 07/13/19

## 2022-02-02 NOTE — PROGRESS NOTES
Progress note: Infectious diseases    Patient - Tabatha Garza,  Age - 73 y.o.    - 1950      Room Number - 2R-59/008-Q   MRN -  [id number]   Acct # - [de-identified]  Date of Admission -  2022  8:49 PM    SUBJECTIVE:   She has no new complaints. Discussed with hospitalist.  OBJECTIVE   VITALS    height is 4' 9\" (1.448 m) and weight is 145 lb 8.1 oz (66 kg). Her oral temperature is 98.8 °F (37.1 °C). Her blood pressure is 132/55 (abnormal) and her pulse is 98. Her respiration is 18 and oxygen saturation is 92%. Wt Readings from Last 3 Encounters:   22 145 lb 8.1 oz (66 kg)   21 168 lb 9 oz (76.5 kg)   10/25/21 164 lb (74.4 kg)       I/O (24 Hours)    Intake/Output Summary (Last 24 hours) at 2022 1451  Last data filed at 2022 1401  Gross per 24 hour   Intake 3149.75 ml   Output 1360 ml   Net 1789.75 ml       General Appearance  Awake, alert, oriented,  Chronically sick looking.   HEENT - normocephalic, atraumatic, pale  conjunctiva,  anicteric sclera  Neck - Supple, no mass  Lungs -  Bilateral  air entry,  Diminished breath sound  Cardiovascular - Heart sounds are normal.     Abdomen - soft, not distended, nontender, colostomy functioning  Left side nephrostomy tube  Neurologic -oriented  Skin - No bruising or bleeding  Extremities - No edema, no cyanosis, clubbing   Stage 4 coccyx wound: dressed  MEDICATIONS:      enoxaparin  40 mg SubCUTAneous Q24H    ciprofloxacin  250 mg Oral Daily    folic acid  1 mg Oral Daily    midodrine  10 mg Per G Tube Q8H    lactobacillus  1 capsule Oral Daily with breakfast    budesonide-formoterol  1 puff Inhalation BID    miconazole   Topical BID    multivitamin+  30 mL Oral Daily    allopurinol  100 mg Oral Daily    aspirin  81 mg Oral Daily    famotidine  20 mg PEG Tube Daily    FLUoxetine  40 mg Oral Daily    [Held by provider] Riociguat  2.5 mg Oral TID    sodium hypochlorite   Irrigation Daily    ferrous sulfate  325 mg Oral Q MWF    epoetin prince-epbx  4,000 Units SubCUTAneous Weekly    sodium chloride flush  5-40 mL IntraVENous 2 times per day      dextrose 75 mL/hr at 02/02/22 1440    sodium chloride       melatonin, docusate, hydrOXYzine, senna, potassium chloride, magnesium sulfate, sodium chloride, sodium chloride flush, ondansetron **OR** ondansetron, polyethylene glycol, acetaminophen **OR** acetaminophen, sodium chloride flush      LABS:     CBC:   Recent Labs     01/31/22  0415 02/01/22  0443 02/02/22  0328   WBC 15.9* 15.6* 15.7*   HGB 9.4* 9.5* 9.2*   * 450* 418*     BMP:    Recent Labs     01/31/22  0415 01/31/22  1455 02/01/22  0443 02/01/22  1214 02/01/22  1953 02/02/22  0328 02/02/22  1253   *   < > 154*   < > 150* 145 148*   K 4.4  --  4.2  --   --  4.1  --      --  109  --   --  103  --    CO2 32  --  33  --   --  32  --    *  --  102*  --   --  93*  --    CREATININE 1.6*  --  1.4*  --   --  1.2  --    GLUCOSE 125*  --  112*  --   --  122*  --     < > = values in this interval not displayed. Calcium:  Recent Labs     02/02/22  0328   CALCIUM 9.5         Problem list of patient:     Patient Active Problem List   Diagnosis Code    HTN (hypertension) I10    Chronic depression F32. A    CHF (congestive heart failure) ([facility]) I50.9    Thrombocytopenia (HCC) D69.6    Moderate episode of recurrent major depressive disorder (HCC) F33.1    Renal failure syndrome N19    Hyperkalemia E87.5    Isolated non-nephrotic proteinuria R80.0    ALVIN (acute kidney injury) ([facility] [address] N17.9    Chronic right-sided heart failure (HCC) I50.812    Pulmonary HTN (HCC) I27.20    Hypotension due to hypovolemia I95.89, E86.1    Acute renal failure superimposed on stage 4 chronic kidney disease (HCC) N17.9, N18.4    Pressure injury of sacral region, stage 4 (HCC) L89.154    Acute respiratory failure (HCC) J96.00    Flash pulmonary edema (HCC) J81.0    Shock ([address] R57.9    Aspiration pneumonia of left lung (HCC) J69.0    Pleural effusion J90    Paroxysmal atrial fibrillation (HCC) I48.0    Hemoptysis R04.2    Chronic respiratory failure with hypoxia (HCC) J96.11    Pneumothorax, right J93.9    Collapse of left lung J98.11    Abnormal chest x-ray R93.89    Acute on chronic respiratory failure with hypoxia (HCC) J96.21    Retroperitoneal hematoma K66.1    HAP (hospital-acquired pneumonia) J18.9, Y95         ASSESSMENT/PLAN   Stage 4 coccyx wound: post debridement, continue local wound care  Malnutrition  ANCA positive vasculites:   Gall stones with dilated CBD, no obstruction  Left nephrostomy tube plaed for staghorn calculi  Deconditioning  Continue current therapy  Plan for ECF.       William Castillo MD, MD, FACP 2/2/2022 2:51 PM

## 2022-02-02 NOTE — PROGRESS NOTES
[facility] ICU STEPDOWN TELEMETRY 4K  Occupational Therapy  Daily Note  Time:   Time In:   Time Out: 910  Timed Code Treatment Minutes: 39 Minutes  Minutes: 36          Date: 2022  Patient Name: Tamera Louise,   Gender: female      Room: [facility]27/027-A  MRN: [id number]  : 1950  (73 y.o.)  Referring Practitioner: Beth Munson. Oh Quijano MD  Diagnosis: acute on chronic respiratory failure with hypoxia  Additional Pertinent Hx: per chart review; Due to hypoxic respiratory failure on 22 from [place]. She had a prolonged hospitalization on on 10/27/21 to 21 after she was admitted due to respiratory failure. ahd a prolonged ICU stay. Had severe pneumonia with effusion required tracheostomy on 21 and decannulated on 21. 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 She was subsequently transferred to [place] on 21 and was back on 22 due to respiratory failure. She had  Loop colostomy for diversion. I was asked to see patient for the non healing coccyx wound. Pt is s/p colostomy . Restrictions/Precautions:  Restrictions/Precautions: General Precautions,Fall Risk,Contact Precautions  Position Activity Restriction  Other position/activity restrictions: PEG tube, colostomy, sacral wound     SUBJECTIVE: OK to see Pt per RN. Pt resting in bed supine upon arrival. Agreeable to OT session and getting to EOB. Cooperative throughout. Pt requesting water swab multiple times. Pt coughing at end of session. Pt stated feeling scared which resolved after encouragement from therapist.    PAIN: No pain reported throughout activity however began to experience increased pain of ulcer on buttocks once supine in bed. Nurse aware. Vitals: Oxygen: 90% on room air upon arrival. 93% after activity     COGNITION: Slow Processing    ADL:   Grooming: Contact Guard Assistance.   CGA while seated EOB to use mouth swab. BALANCE:  Sitting Balance:  Contact Guard Assistance, Minimal Assistance. Pt sat EOB with Min A and occasional CGA. Pt needed UE support entire time, requiring cues to correct posture and leaning laterally. Pt has LE contractures. Pt stated feeling like \"im not coordinated. \" Pt completed minimal reaching w/ R UE to use water swab. Pt able to tolerate 5 minutes seated EOB before c/o of dizziness. Dizziness resolved once supine in bed. BED MOBILITY:  Rolling to Left: Maximum Assistance, with rail    Supine to Sit: Maximum Assistance, with head of bed raised, with increased time for completion ; Pt required A with LE and at trunk  Sit to Supine: Maximum Assistance, with verbal cues , with increased time for completion ; Pt required A to bring LE and trunk into bed  Scooting: Maximum Assistance, with increased time for completion ; to EOB    TRANSFERS:  Pt not safe to attempt due to LE contractures and weakness. ASSESSMENT:     Activity Tolerance:  Patient tolerance of  treatment: fair. Discharge Recommendations: Subacute/skilled nursing facility  Equipment Recommendations: Equipment Needed: No  Plan: Times per week: 3-5x  Times per day: Daily  Current Treatment Recommendations: [place]e Cue Re-education,Patient/Caregiver Education & Training,Self-Care / ADL,Equipment Evaluation, Education, & procurement,Safety Education & Training    Frequency changed based on Pt activity tolerance     Patient Education  Patient Education: Role of OT and Plan of Care    Goals  Short term goals  Time Frame for Short term goals: by discharge  Short term goal 1: OTR to assess all mobility and create goal as appropriate. GOAL MET  Short term goal 2: Patient will toleate minimal resistive UB and  strengthening to increase ease with grooming and mobility. Short term goal 3: patient will complete simple grooming and bathing tasks with MIN A.     Revised Short-Term Goals  Short term goals  Time Frame for Short term goals: by discharge  Short term goal 1: Pt will tolerate sitting EOB 10 minutes with consistent SBA in prep for ADL completion. Short term goal 2: Patient will toleate minimal resistive UB and  strengthening to increase ease with grooming and mobility. Short term goal 3: patient will complete simple grooming and bathing tasks with MIN A. Following session, patient left in safe position with all fall risk precautions in place.

## 2022-02-03 LAB
ANION GAP SERPL CALCULATED.3IONS-SCNC: 12 MEQ/L (ref 8–16)
BASOPHILS # BLD: 1.1 %
BASOPHILS ABSOLUTE: 0.2 THOU/MM3 (ref 0–0.1)
BUN BLDV-MCNC: 86 MG/DL (ref 7–22)
CALCIUM SERPL-MCNC: 9.2 MG/DL (ref 8.5–10.5)
CHLORIDE BLD-SCNC: 98 MEQ/L (ref 98–111)
CO2: 29 MEQ/L (ref 23–33)
CREAT SERPL-MCNC: 1.3 MG/DL (ref 0.4–1.2)
EOSINOPHIL # BLD: 3.5 %
EOSINOPHILS ABSOLUTE: 0.5 THOU/MM3 (ref 0–0.4)
ERYTHROCYTE [DISTWIDTH] IN BLOOD BY AUTOMATED COUNT: 15.9 % (ref 11.5–14.5)
ERYTHROCYTE [DISTWIDTH] IN BLOOD BY AUTOMATED COUNT: 57.9 FL (ref 35–45)
GFR SERPL CREATININE-BSD FRML MDRD: 41 ML/MIN/1.73M2
GLUCOSE BLD-MCNC: 117 MG/DL (ref 70–108)
HCT VFR BLD CALC: 31.6 % (ref 37–47)
HEMOGLOBIN: 9.1 GM/DL (ref 12–16)
HYPOCHROMIA: PRESENT
IMMATURE GRANS (ABS): 0.11 THOU/MM3 (ref 0–0.07)
IMMATURE GRANULOCYTES: 0.7 %
LYMPHOCYTES # BLD: 8.8 %
LYMPHOCYTES ABSOLUTE: 1.4 THOU/MM3 (ref 1–4.8)
MCH RBC QN AUTO: 28.7 PG (ref 26–33)
MCHC RBC AUTO-ENTMCNC: 28.8 GM/DL (ref 32.2–35.5)
MCV RBC AUTO: 99.7 FL (ref 81–99)
MONOCYTES # BLD: 6.3 %
MONOCYTES ABSOLUTE: 1 THOU/MM3 (ref 0.4–1.3)
NUCLEATED RED BLOOD CELLS: 0 /100 WBC
PLATELET # BLD: 422 THOU/MM3 (ref 130–400)
PMV BLD AUTO: 9.7 FL (ref 9.4–12.4)
POTASSIUM SERPL-SCNC: 4.2 MEQ/L (ref 3.5–5.2)
RBC # BLD: 3.17 MILL/MM3 (ref 4.2–5.4)
SEG NEUTROPHILS: 79.6 %
SEGMENTED NEUTROPHILS ABSOLUTE COUNT: 12.3 THOU/MM3 (ref 1.8–7.7)
SODIUM BLD-SCNC: 137 MEQ/L (ref 135–145)
SODIUM BLD-SCNC: 139 MEQ/L (ref 135–145)
WBC # BLD: 15.4 THOU/MM3 (ref 4.8–10.8)

## 2022-02-03 PROCEDURE — 6370000000 HC RX 637 (ALT 250 FOR IP): Performed by: NURSE PRACTITIONER

## 2022-02-03 PROCEDURE — 2580000003 HC RX 258: Performed by: NURSE PRACTITIONER

## 2022-02-03 PROCEDURE — 84295 ASSAY OF SERUM SODIUM: CPT

## 2022-02-03 PROCEDURE — 2060000000 HC ICU INTERMEDIATE R&B

## 2022-02-03 PROCEDURE — 2580000003 HC RX 258: Performed by: STUDENT IN AN ORGANIZED HEALTH CARE EDUCATION/TRAINING PROGRAM

## 2022-02-03 PROCEDURE — 94640 AIRWAY INHALATION TREATMENT: CPT

## 2022-02-03 PROCEDURE — 6370000000 HC RX 637 (ALT 250 FOR IP): Performed by: STUDENT IN AN ORGANIZED HEALTH CARE EDUCATION/TRAINING PROGRAM

## 2022-02-03 PROCEDURE — 6370000000 HC RX 637 (ALT 250 FOR IP): Performed by: INTERNAL MEDICINE

## 2022-02-03 PROCEDURE — 94760 N-INVAS EAR/PLS OXIMETRY 1: CPT

## 2022-02-03 PROCEDURE — 99233 SBSQ HOSP IP/OBS HIGH 50: CPT | Performed by: INTERNAL MEDICINE

## 2022-02-03 PROCEDURE — 85025 COMPLETE CBC W/AUTO DIFF WBC: CPT

## 2022-02-03 PROCEDURE — 6360000002 HC RX W HCPCS: Performed by: INTERNAL MEDICINE

## 2022-02-03 PROCEDURE — 36415 COLL VENOUS BLD VENIPUNCTURE: CPT

## 2022-02-03 PROCEDURE — 80048 BASIC METABOLIC PNL TOTAL CA: CPT

## 2022-02-03 PROCEDURE — 92526 ORAL FUNCTION THERAPY: CPT

## 2022-02-03 RX ADMIN — ENOXAPARIN SODIUM 40 MG: 100 INJECTION SUBCUTANEOUS at 15:01

## 2022-02-03 RX ADMIN — DAKIN'S SOLUTION 0.125% (QUARTER STRENGTH): 0.12 SOLUTION at 09:52

## 2022-02-03 RX ADMIN — BUDESONIDE AND FORMOTEROL FUMARATE DIHYDRATE 1 PUFF: 80; 4.5 AEROSOL RESPIRATORY (INHALATION) at 17:49

## 2022-02-03 RX ADMIN — HYDROXYZINE HYDROCHLORIDE 25 MG: 25 TABLET, FILM COATED ORAL at 09:54

## 2022-02-03 RX ADMIN — ASPIRIN 81 MG CHEWABLE TABLET 81 MG: 81 TABLET CHEWABLE at 09:54

## 2022-02-03 RX ADMIN — FOLIC ACID 1 MG: 1 TABLET ORAL at 09:54

## 2022-02-03 RX ADMIN — ACETAMINOPHEN 650 MG: 325 TABLET ORAL at 23:07

## 2022-02-03 RX ADMIN — BUDESONIDE AND FORMOTEROL FUMARATE DIHYDRATE 1 PUFF: 80; 4.5 AEROSOL RESPIRATORY (INHALATION) at 07:41

## 2022-02-03 RX ADMIN — ACETAMINOPHEN 650 MG: 325 TABLET ORAL at 03:13

## 2022-02-03 RX ADMIN — FAMOTIDINE 20 MG: 20 TABLET, FILM COATED ORAL at 09:54

## 2022-02-03 RX ADMIN — Medication 30 ML: at 09:54

## 2022-02-03 RX ADMIN — DEXTROSE MONOHYDRATE: 50 INJECTION, SOLUTION INTRAVENOUS at 06:23

## 2022-02-03 RX ADMIN — Medication 1 CAPSULE: at 09:54

## 2022-02-03 RX ADMIN — SODIUM CHLORIDE, PRESERVATIVE FREE 10 ML: 5 INJECTION INTRAVENOUS at 20:13

## 2022-02-03 RX ADMIN — HYDROXYZINE HYDROCHLORIDE 25 MG: 25 TABLET, FILM COATED ORAL at 03:15

## 2022-02-03 RX ADMIN — ALLOPURINOL 100 MG: 100 TABLET ORAL at 09:54

## 2022-02-03 RX ADMIN — CIPROFLOXACIN 250 MG: 250 TABLET, FILM COATED ORAL at 09:54

## 2022-02-03 RX ADMIN — MIDODRINE HYDROCHLORIDE 10 MG: 5 TABLET ORAL at 23:07

## 2022-02-03 RX ADMIN — MICONAZOLE NITRATE: 2 POWDER TOPICAL at 20:13

## 2022-02-03 RX ADMIN — SODIUM CHLORIDE, PRESERVATIVE FREE 10 ML: 5 INJECTION INTRAVENOUS at 09:52

## 2022-02-03 RX ADMIN — FLUOXETINE HYDROCHLORIDE 40 MG: 20 CAPSULE ORAL at 09:54

## 2022-02-03 RX ADMIN — MICONAZOLE NITRATE: 2 POWDER TOPICAL at 09:54

## 2022-02-03 ASSESSMENT — PAIN SCALES - GENERAL
PAINLEVEL_OUTOF10: 0
PAINLEVEL_OUTOF10: 0
PAINLEVEL_OUTOF10: 5
PAINLEVEL_OUTOF10: 3

## 2022-02-03 NOTE — PROGRESS NOTES
This nurse spoke with pt's sister Samir and updated on plan to transport today at 5pm to [facility].  She verbalized thanks

## 2022-02-03 NOTE — PROGRESS NOTES
Comprehensive Nutrition Assessment    Type and Reason for Visit:  Reassess (TF Monitor)    Nutrition Recommendations/Plan:   *Continue Nepro 30ml/hr. (goal rate)  *Continue Proteinex bolus 2.5 ounces daily. *Continue flushing 300 ml free water every 4 hrs per Dr Kanu Talbot (2/2/22)  *Recommend getting a recent weight on patient as able. Nutrition Assessment: Pt. improving from a nutritional standpoint AEB TF at goal and is tolerating it well per RN. Remains at risk for further nutritional compromise r/t admit with sepsis, UTI; intubated on 1/24 and extubated on 1/29; previous [facility] admit 10/27 - 12/1/21 for pneumonia then [facility] now back to [facility]; dysphagia d/t prolonged intubations - NPO at [address] [place] [address] swallow eval (1/31) rec NPO ,Hx HD (none since 12/23/21) and vent/trach (out now), wound debridements, nephrostomy tube, retroperitoneal hematoma, LOS day 14 and underlying medical condition CHF, obesity. Malnutrition Assessment:  Malnutrition Status:  No malnutrition    Context:  Chronic Illness     Findings of the 6 clinical characteristics of malnutrition:  Energy Intake:  No significant decrease in energy intake (EN at [place])  Weight Loss:  No significant weight loss     Body Fat Loss:  No significant body fat loss     Muscle Mass Loss:  No significant muscle mass loss    Fluid Accumulation:  1 - Mild     Strength:  Not Performed    Estimated Daily Nutrient Needs:  Energy (kcal):  ~7875-7985 kcal/day (18-20 kcal/kg); Weight Used for Energy Requirements:  Current (66 kg)     Protein (g):  50-84gms (1.2-2/kgm IBW) monitoring renal status; Weight Used for Protein Requirements:  Ideal (42kgm)        Fluid (ml/day):  5122-3386 mL/day (30-35 mL/day or free water per MD);  Method Used for Fluid Requirements:  ml/Kg (66 kg)      Nutrition Related Findings: pt seen earlier this morning; NPO per SLP on 1/31/22; +PEG tube in place; pt is tolerating Nepro TF at goal of 30 mL/hr with 300 mL free water every 4 hours with no signs of intolerance per RN; 200 mL output from colostomy; pt denies N/V and is tolerating TF well; Labs: Na 139, BUN 86, Cr. 1.3, Glucose 117. Rx includes: Cipro, Pepcid, Iron, Folic Acid, Culturelle, Liquid Multivitamin. Wounds:   (coccyx with possible osteomyelitis; debrided 8/16/21; unstageable  rt & left heels; nephrostomy tube , stage IV large decubitus ulver -diverting colostomy (1/27/22), skin tear back left;upper, unstageable sacrum  DTI coccyx, incision abdomen lower medial)       Current Nutrition Therapies:    Diet NPO  ADULT TUBE FEEDING; PEG; Renal Formula; Continuous; 30; No; 300; Q 4 hours  Current Tube Feeding (TF) Orders:  · Feeding Route: PEG  · Formula: Renal Formula (Nepro)  · Schedule: Continuous (goal of 30ml/hour)  · Additives/Modulars: Protein (1/21 bolus 1 2.5ounce liquid protein bottle daily)  · Water Flushes: 300ml free water every 4 hrs per Dr Elinor Masterson  (2/2/22)( 1800ml free water flushes/24 hrs)  · Current TF & Flush Orders Provides: TF at goal  · Goal TF & Flush Orders Provides: Nepro 30ml/hr + 1 proteinex 2.5 ounces daily yields 1378kcals (1274TF, 104 modular), 84 grams protein (58 TF, 26 modular), 115 grams CHO, 18 grams fiber, 2323 ml water (1800 ml water flushes, 523 ml TF) in total volume of 2595 ml (720ml TF, 75 modular, 1800 water flushes)/24 hrs    Anthropometric Measures:  · Height: 4' 9\" (144.8 cm)  · Current Body Weight: 145 lb 8.1 oz (66 kg) ((1/28/22;+1 pitting BLE;+1 non-pitting  BLE))   · Admission Body Weight: 178 lb (80.7 kg) (1/20 with +2 edema)    · Usual Body Weight: 168 lb (76.2 kg) (10/27/21; 178# 4/5/21)     · Ideal Body Weight: 93 lbs  · BMI: 31.5  · BMI Categories: Obese Class 1 (BMI 30.0-34. 9)       Nutrition Diagnosis:   · Inadequate oral intake related to swallowing difficulty as evidenced by NPO or clear liquid status due to medical condition,nutrition support - enteral nutrition,swallow study results    Nutrition Interventions:   Food and/or Nutrient Delivery:  Continue NPO,Continue Current Tube Feeding  Nutrition Education/Counseling:  No recommendation at this time   Coordination of Nutrition Care:  Continue to monitor while inpatient,Speech Therapy    Goals:  EN to provide at least 75% nutritional needs during LOS. Nutrition Monitoring and Evaluation:   Behavioral-Environmental Outcomes:  None Identified   Food/Nutrient Intake Outcomes:  Enteral Nutrition Intake/Tolerance  Physical Signs/Symptoms Outcomes:  Biochemical Data,Weight,Skin,Nutrition Focused Physical Findings,Fluid Status or Edema,Chewing or Swallowing,GI Status     Discharge Planning:     Too soon to determine     Electronically signed by Jero Garcia MS, RD, LD on 2/3/22 at 9:43 AM EST    Contact: [phone]

## 2022-02-03 NOTE — PROGRESS NOTES
Progress note: Infectious diseases    Patient - Miguel Ángel Chau,  Age - 73 y.o.    - 1950      Room Number - 4O-16/094-G   MRN -  [id number]   Acct # - [de-identified]  Date of Admission -  2022  8:49 PM    SUBJECTIVE:   No new issues. OBJECTIVE   VITALS    height is 4' 9\" (1.448 m) and weight is 145 lb 8.1 oz (66 kg). Her oral temperature is 98.5 °F (36.9 °C). Her blood pressure is 150/62 (abnormal) and her pulse is 92. Her respiration is 18 and oxygen saturation is 93%. Wt Readings from Last 3 Encounters:   22 145 lb 8.1 oz (66 kg)   21 168 lb 9 oz (76.5 kg)   10/25/21 164 lb (74.4 kg)       I/O (24 Hours)    Intake/Output Summary (Last 24 hours) at 2/3/2022 1107  Last data filed at 2/3/2022 0805  Gross per 24 hour   Intake 2106 ml   Output 1810 ml   Net 296 ml       General Appearance  Awake, alert, oriented,  Chronically sick looking.   HEENT - normocephalic, atraumatic, pale  conjunctiva,  anicteric sclera  Neck - Supple, no mass  Lungs -  Bilateral  air entry,  Diminished breath sound  Cardiovascular - Heart sounds are normal.     Abdomen - soft, not distended, nontender, colostomy functioning  Left side nephrostomy tube  Neurologic -oriented  Skin - No bruising or bleeding  Extremities - No edema, no cyanosis, clubbing   Stage 4 coccyx wound: dressed  MEDICATIONS:      enoxaparin  40 mg SubCUTAneous Q24H    ciprofloxacin  250 mg Oral Daily    folic acid  1 mg Oral Daily    midodrine  10 mg Per G Tube Q8H    lactobacillus  1 capsule Oral Daily with breakfast    budesonide-formoterol  1 puff Inhalation BID    miconazole   Topical BID    multivitamin+  30 mL Oral Daily    allopurinol  100 mg Oral Daily    aspirin  81 mg Oral Daily    famotidine  20 mg PEG Tube Daily    FLUoxetine  40 mg Oral Daily    [Held by provider] Riociguat  2.5 mg Oral TID    sodium hypochlorite   Irrigation Daily    ferrous sulfate  325 mg Oral Q MWF    epoetin prince-epbx  4,000 Units SubCUTAneous Weekly    sodium chloride flush  5-40 mL IntraVENous 2 times per day      sodium chloride       melatonin, docusate, hydrOXYzine, senna, potassium chloride, magnesium sulfate, sodium chloride, sodium chloride flush, ondansetron **OR** ondansetron, polyethylene glycol, acetaminophen **OR** acetaminophen, sodium chloride flush      LABS:     CBC:   Recent Labs     02/01/22  0443 02/02/22  0328 02/03/22  0337   WBC 15.6* 15.7* 15.4*   HGB 9.5* 9.2* 9.1*   * 418* 422*     BMP:    Recent Labs     02/01/22  0443 02/01/22  1214 02/02/22  0328 02/02/22  1253 02/03/22  0337   *   < > 145 148* 139   K 4.2  --  4.1  --  4.2     --  103  --  98   CO2 33  --  32  --  29   *  --  93*  --  86*   CREATININE 1.4*  --  1.2  --  1.3*   GLUCOSE 112*  --  122*  --  117*    < > = values in this interval not displayed. Calcium:  Recent Labs     02/03/22 0337   CALCIUM 9.2         Problem list of patient:     Patient Active Problem List   Diagnosis Code    HTN (hypertension) I10    Chronic depression F32. A    CHF (congestive heart failure) ([facility]) I50.9    Thrombocytopenia ([facility]) D69.6    Moderate episode of recurrent major depressive disorder ([facility]) F33.1    Renal failure syndrome N19    Hyperkalemia E87.5    Isolated non-nephrotic proteinuria R80.0    ALVIN (acute kidney injury) ([facility] [address] N17.9    Chronic right-sided heart failure (HCC) I50.812    Pulmonary HTN ([facility]) I27.20    Hypotension due to hypovolemia I95.89, E86.1    Acute renal failure superimposed on stage 4 chronic kidney disease (HCC) N17.9, N18.4    Pressure injury of sacral region, stage 4 (HCC) L89.154    Acute respiratory failure ([facility]) J96.00    Flash pulmonary edema (HCC) J81.0    Shock (HCC) R57.9    Aspiration pneumonia of left lung (HCC) J69.0    Pleural effusion J90    Paroxysmal atrial fibrillation (HCC) I48.0    Hemoptysis R04.2  Chronic respiratory failure with hypoxia (HCC) J96.11    Pneumothorax, right J93.9    Collapse of left lung J98.11    Abnormal chest x-ray R93.89    Acute on chronic respiratory failure with hypoxia (HCC) J96.21    Retroperitoneal hematoma K66.1    HAP (hospital-acquired pneumonia) J18.9, Y95         ASSESSMENT/PLAN   Stage 4 coccyx wound: post debridement, continue local wound care  Malnutrition. ANCA positive vasculites:   Gall stones with dilated CBD, no obstruction  Left nephrostomy tube plaed for staghorn calculi  Deconditioning  Continue current therapy  Plan for ECF.       Hitesh Nevarez MD, MD, FACP 2/3/2022 11:07 AM

## 2022-02-03 NOTE — CARE COORDINATION
2/3/22, 11:18 AM EST    Patient goals/plan/ treatment preferences discussed by  and . Patient goals/plan/ treatment preferences reviewed with patient/ family. Patient/ family verbalize understanding of discharge plan and are in agreement with goal/plan/treatment preferences. Understanding was demonstrated using the teach back method. AVS provided by RN at time of discharge, which includes all necessary medical information pertaining to the patients current course of illness, treatment, post-discharge goals of care, and treatment preferences. Services After Discharge  Services At/After Discharge: Aide Services,Housekeeping,Nursing Services,OT,PT [facility] [place])   IMM Letter  IMM Letter given to Patient/Family/Significant other/Guardian/POA/by[de-identified]   IMM Letter date given[de-identified] 02/02/22  IMM Letter time given[de-identified] Maurice Laureano will be [facility] to [facility] [address] [facility]. She will be skilled under her Medicare benefit. Transport has been set up for 5:00pm today. Spoke with Kenneth Manzanares at the facility and they are ready to accept.

## 2022-02-03 NOTE — PROGRESS NOTES
[address] [facility]  [facility] ICU STEPDOWN TELEMETRY 4K  DAILY NOTE    TIME   SLP Individual Minutes  Time In: 2644  Time Out: 9255  Minutes: 14  Timed Code Treatment Minutes: 0 Minutes       Date: 2/3/2022  Patient Name: Tessy Doll: [id number]   : 1950  (73 y.o.)  Gender: female   Referring Physician:  Dr. Afshin Restrepo  Diagnosis: Acute on chronic respiratory failure with hypoxia   Secondary Diagnosis: Dysphagia   Precautions: Aspiration   Current Diet: NPO - TF via PEG  Swallowing Strategies: NPO + Oral Care   Date of Last MBS/FEES: Not Applicable    Pain:  No pain reported. Subjective:  Patient seen at bedside; alert and pleasant. Highly deconditioned overall; intermittent confusion noted. No family present. Short-Term Goals:  SHORT TERM GOAL #1:  Goal 1: Patient will consume PO trials of ice chips, thin liquids, and purees (as deemed clinically indicated) demonstrating consistency for pharyngeal swallow trigger, endurance, and stable RR to determine readiness for instrumental evaluation. INTERVENTIONS: ST completed po trials of ice chips following completion of oral care this date. Patient demonstrated evidence of good oral initiation, delayed oral initiation, slow/uncoordinated mastication, suspected decreased bolus control/containment, suspected delayed swallow initiation, delayed cough, and wet/gurgly vocal quality observed following ice chips 2/3 trials. Patient with weak cough. Patient reporting, Jero Cram is enough. \"     *recommend patient continues NPO with continuation of TF via PEG to meet nutrition/hydration needs.      *post evaluation, patient without respiratory distress upon leaving room; RN Chetna Peña notified re: clinical findings and recommendations from the assessment; verbal receptiveness noted     SHORT TERM GOAL #2:  Goal 2: Staff will exhibit return demonstration for completion of oral care to assist with maximizing oral integrity and to reduce potential bacteria colonization. INTERVENTIONS: ST/patient completed oral care this date via hydrogen peroxide + water mixture via toothete. SHORT TERM GOAL #3:  Goal 3: Complete instrumental evaluation (FEES or MBS) when clinically appropriate in order to furthur evaluate pharyngeal function of the swallow and determine safety of PO intake and furthur goals/POC  INTERVENTIONS: Not appropriate at this d/t ongoing deconditioning and overt s/s of aspiration with minimal PO trials completed at bedside. SHORT TERM GOAL #4:  Goal 4: Patient will complete oropharyngeal strengthening exercises (i.e. lingual lateralization/protrusion/circles, effortful swallows) x10 for improved strengthening/endurance needed for enhanced tolerance of PO intake. INTERVENTIONS:   Lingual Lateralizations: x3 reps completed; MAX cues     Lingual circles: x2 each direction; MAX cues     Lingual protrusion: x5; MAX cues     Effortful swallows: x1 MAX cues     *recommend ongoing dysphagia tx; HEP- lingual lateralization x5      SHORT TERM GOAL #5:  Goal 5: Complete cognitive/speech/language evaluation when clinically appropriate and determine goals and POC as appropriate. INTERVENTIONS: Not indicated at this time. Long-Term Goals:  No long term goals recommended d/t short ELOS          EDUCATION:  Learner: Patient  Education:  Reviewed results and recommendations of this evaluation, Reviewed signs, symptoms and risks of aspiration, Reviewed ST goals and Plan of Care, Education Related to Potential Risks and Complications Due to Impairment/Illness/Injury and Education Related to [employer] and Wellness  Evaluation of Education: Hubert Olivo understanding, Needs further instruction and Family not present    ASSESSMENT/PLAN:  Activity Tolerance:  Patient tolerance of  treatment: fair. Assessment/Plan: Patient progressing toward established goals.   Continues to require skilled care of licensed speech pathologist to progress toward achievement of established goals and plan of care. .     Plan for Next Session: PO trials, OME, Oral care       Lizzy Mccormack M.S. [address]

## 2022-02-03 NOTE — PROGRESS NOTES
[facility] called to inform this nurse that transportation will have to be cancelled for today d/t road and weather conditions. Rescheduled for tomorrow 2/4/22 at 1230pm. Called and updated pt's sister Jeffery Arambula, pt aware and Dawn Moore informed (spoke with Marii Rao).

## 2022-02-03 NOTE — PROGRESS NOTES
Hospitalist Progress Note    Patient:  Cozette Dance      Unit/Bed:4K-27/027-A    YOB: 1950    MRN: [id number]       Acct: [de-identified]     PCP: Neeru Flores MD    Date of Admission: 1/20/2022    Assessment/Plan:    1. Acute on chronic hypoxic respiratory failure: Intubated 1/24/2022 due to concerns patient was not protecting airway. Laparoscopy diverting transverse loop colostomy performed 1/27/2022. Patient weaned off pressors and was on minimum vent sedation/settings. Patient appropriately extubated, weaned down to 1-2 L nasal cannula. 2/2 patient on room air, saturating 94% O2.   - Continue monitor for signs/symptoms of respiratory distress    2. Hospital acquired pneumonia: Acquired while in [place]. Pneumonia panel positive for Proteus and Pseudomonas. Culture growing Candida, likely contaminant. Initiated on Zosyn for 2 days initially, transitioned over to meropenem. Sensitivities indicate appropriate antibiotic usage. ID consulted, appreciate recs. Meropenem discontinued on 2/1.     3. Acute metabolic encephalopathy: Improved, multifactorial related to problems listed. May also be component of uremia as her BUN has been greater than 100 however it has been improving. 2/2 BUN 93.    4. Hypernatremia: Resolved. Likely secondary to dehydration, Na 150. Free water deficit 2.36L. Free water flushes initially 30mL q8hr. 2/1 started D5W infusion. 2/2 sodium level 145, discontinued D5W. 2/3 sodium level 139, repeat 137.    - Continue to monitor with BMP in am     5. Complicated UTI/pyelo a/w staghorn calc: Present on arrival. Previous culture ESBL producing. Patient was on meropenem 1/23-2/1.   - Continue prophylactic Cipro 250 mg q 24 hours (Day 2), as patient will benefit from Cipro until definite stone treatment completed. If not candidate for stone treatment will likely require longer term prophylaxis.     6. Pulmonary hypertension: History of.  - Holding Diuril, patient with 15L urinary output    7. Stage IV decubitus ulcer: General surgery consulted. Patient underwent diverting colostomy 1/27/2022. Wound care on board  - ID consult: Stopped meropenem, wound care    8. 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 Thrombocytosis, mild: Likely reactive in nature, platelet count stable  - Daily CBC    10. Left obstructive uropathy: Nephrostomy tube placed, changed 1/12/22. Urology consulted, no further intervention.   - I's and O's    11. CKD: Cr stable. Urine output adequate  -  I's and O's  - Will continue to monitor    12. Macrocytic anemia: Hgb stable   - Continue weekly retacrit    13. Gout: History of.  - Continue allopurinol    14. Dilated CBD: US gallbladder demonstrated CBD dilation of 14mm. MRCP with contrast reported small stone in bile duct with 8mm dilatation. GI consulted, no plans for acute intervention    15. BARBER: History of. Patient off of ventilator and tolerating nasal cannula   - Follow-up outpatient    16. Obesity: BMI 31.49       Expected discharge date:  TBD    Disposition:    [] Home       [] TCU       [] Rehab       [] Psych       [x] SNF       [] [place]       [] Other-    Chief Complaint: Shortness of Breath    Hospital Course:   Per HPI: \"Kenzie Diop is a 72-year-old  female transferred to [facility] on 1/20/2022 with acute respiratory failure, hypoxia.  Patient is currently on a BiPAP.     Patient has past medical history significant for everyday smoker, BARBER, PAF s/p cardioversion 11/3/2021, PAH grp 3, HFpEF-ECHO 10/2021 LV 60 to 65% with trace tricuspid regurg.  RV with normal function, essential hypertension, depression, gout, osteoarthritis, renal calculi, thrombocytopenia, stage 3 sacral ulcer s/p wound ostomy.   Recent [facility] hospitalization 10/27-12/1/2021 patient suffered a very lengthy hospitalization secondary to acute hypoxic and hypercapnic respiratory failure felt to be secondary to severe pneumonia with left-sided pleural effusion and repeated mucous plugging.  Patient did require tracheostomy on 11/17/2021, and de cannulated 12/23/2021.  Stage IV decubitus ulcer with suspected osteomyelitis, ALVIN likely ATN, ANCA associated Vasculitis, hydronephrosis secondary to left-sided staghorn calculi percutaneous tube was placed on 11/12/2021, bilateral pleural effusions felt to be transudative status post thoracentesis of 1 L on 12/1/2021. Transferred to [facility] 12/1/2021.      Per family noted decreased responsiveness and change in mental status over the last 2 days with vague complaint of back and arm pain.  An EKG was completed on 1/19/2022 with no acute ST changes.  And troponin evaluation revealed 0.176 and 0.155.  Patient became hypoxic and hypercapnic and was transitioned to BiPAP therapy.  1/20/2022 Dr. Barbara Hernandez concern with sepsis and requested patient to be transferred to the ICU for further management and care. \"    Subjective (past 24 hours):   Patient seen and examined this morning. Patient states she is open anxious today and missing her siblings. She is having less of her thick secretions. She denies having any fevers, chills, chest pain, abdominal pain, or shortness of breath. Patient will be going to [facility], will likely be able to go today or tomorrow based on transport availability. ROS (12 point review of systems completed. Pertinent positives noted. Otherwise ROS is negative).     Medications:  Reviewed    Infusion Medications    sodium chloride       Scheduled Medications    enoxaparin  40 mg SubCUTAneous Q24H    ciprofloxacin  250 mg Oral Daily    folic acid  1 mg Oral Daily    midodrine  10 mg Per G Tube Q8H    lactobacillus  1 capsule Oral Daily with breakfast    budesonide-formoterol  1 puff Inhalation BID    miconazole   Topical BID    multivitamin+  30 mL Oral Daily    allopurinol  100 mg Oral Daily    aspirin  81 mg Oral Daily    famotidine  20 mg PEG Tube Daily    FLUoxetine  40 mg Oral Daily    [Held by provider] Riociguat  2.5 mg Oral TID    sodium hypochlorite   Irrigation Daily    ferrous sulfate  325 mg Oral Q MWF    epoetin prince-epbx  4,000 Units SubCUTAneous Weekly    sodium chloride flush  5-40 mL IntraVENous 2 times per day     PRN Meds: melatonin, docusate, hydrOXYzine, senna, potassium chloride, magnesium sulfate, sodium chloride, sodium chloride flush, ondansetron **OR** ondansetron, polyethylene glycol, acetaminophen **OR** acetaminophen, sodium chloride flush      Intake/Output Summary (Last 24 hours) at 2/3/2022 1353  Last data filed at 2/3/2022 0805  Gross per 24 hour   Intake 2106 ml   Output 1810 ml   Net 296 ml       Diet:  Diet NPO  ADULT TUBE FEEDING; PEG; Renal Formula; Continuous; 30; No; 300; Q 4 hours    Exam:  BP (!) 150/62   Pulse 92   Temp 98.5 °F (36.9 °C) (Oral)   Resp 18   Ht 4' 9\" (1.448 m)   Wt 145 lb 8.1 oz (66 kg)   SpO2 93%   BMI 31.49 kg/m²     General appearance: No apparent distress, appears stated age and cooperative. HEENT: Pupils equal, round, and reactive to light. Conjunctivae/corneas clear. Neck: Supple, with full range of motion. No jugular venous distention. Trachea midline. Respiratory:  Normal respiratory effort. Clear to auscultation, bilaterally without Rales/Wheezes/Rhonchi. Cardiovascular: Regular rate and rhythm with normal S1/S2 without murmurs, rubs or gallops. Abdomen: Soft, non-tender, non-distended with normal bowel sounds. Musculoskeletal: passive and active ROM x 4 extremities. Skin: Skin color, texture, turgor normal.  No rashes or lesions. Neurologic:  Neurovascularly intact without any focal sensory/motor deficits.  Cranial nerves: II-XII intact, grossly non-focal.  Psychiatric: Alert and oriented, thought content appropriate, normal insight  Capillary Refill: Brisk,< 3 seconds   Peripheral Pulses: +2 palpable, equal bilaterally       Labs:   Recent Labs     02/01/22  7228 02/02/22  0328 02/03/22  0337   WBC 15.6* 15.7* 15.4*   HGB 9.5* 9.2* 9.1*   HCT 33.8* 32.7* 31.6*   * 418* 422*     Recent Labs     02/01/22  0443 02/01/22  1214 02/02/22  0328 02/02/22  0328 02/02/22  1253 02/03/22  0337 02/03/22  1256   *   < > 145   < > 148* 139 137   K 4.2  --  4.1  --   --  4.2  --      --  103  --   --  98  --    CO2 33  --  32  --   --  29  --    *  --  93*  --   --  86*  --    CREATININE 1.4*  --  1.2  --   --  1.3*  --    CALCIUM 9.6  --  9.5  --   --  9.2  --     < > = values in this interval not displayed. No results for input(s): AST, ALT, BILIDIR, BILITOT, ALKPHOS in the last 72 hours. No results for input(s): INR in the last 72 hours. No results for input(s): [place] in the last 72 hours. Microbiology:      Urinalysis:      Lab Results   Component Value Date    NITRU NEGATIVE 01/20/2022    WBCUA 50-75 01/20/2022    BACTERIA NONE SEEN 01/20/2022    RBCUA  01/20/2022    BLOODU LARGE 01/20/2022    SPECGRAV 1.014 01/20/2022    GLUCOSEU NEGATIVE 01/19/2022       Radiology:  MRI ABDOMEN WO CONTRAST MRCP   Final Result   1. Small 2 mm distal common bile duct stone with mild dilatation of the    common bile duct at 8 mm (upper normal 7 mm for patient's age). No    intrahepatic biliary dilatation. 2. Cholelithiasis without gallbladder wall edema. 3. 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   4. Left subcapsular renal hematoma measuring 2.2 cm in width with    extension to the left posterior paranephric spaces is seen on recent CT. Underlying left renal stones. 5. Benign right renal cysts. 6. Partially seen bilateral pleural effusions. Mild upper abdominal    ascites. This document has been electronically signed by: Janis Wolfe MD on    01/26/2022 08:33 PM      CT HEAD WO CONTRAST   Final Result   1. No acute intracranial process.    2. Subcortical/periventricular white matter hypoattenuation statistically    representing changes of chronic microvascular ischemia. Global parenchymal    volume loss which is commensurate with reported age. This document has been electronically signed by: Kim Posadas DO on    01/24/2022 05:49 AM      All CTs at this facility use dose modulation techniques and iterative    reconstructions, and/or weight-based dosing   when appropriate to reduce radiation to a low as reasonably achievable. XR CHEST PORTABLE   Final Result   ET tube 2.5 cm above the avelina. This document has been electronically signed by: Otf Ko MD on 01/24/2022 04:51 AM      XR CHEST PORTABLE   Final Result   Improving heart failure. This document has been electronically signed by: Otf Ko MD on 01/24/2022 12:38 AM      XR CHEST PORTABLE   Final Result   1. Stable cardiomegaly with pulmonary vascular congestion suspicious for congestive heart failure. 2. Small left-sided pleural effusion. 3. Bilateral atelectasis/infiltrate. **This report has been created using voice recognition software. It may contain minor errors which are inherent in voice recognition technology. **      Final report electronically signed by Dr. Gwendolyn Car on 1/23/2022 10:39 AM      XR CHEST PORTABLE   Final Result   Impression:   Cardiomegaly with pulmonary vascular congestion and bilateral pleural    effusions, similar to prior study. This document has been electronically signed by: Natasha Oliveira MD on    01/22/2022 04:39 AM      VL DUP LOWER EXTREMITY VENOUS BILATERAL   Final Result   No evidence of a DVT. **This report has been created using voice recognition software. It may contain minor errors which are inherent in voice recognition technology. **      Final report electronically signed by Dr Chapo Sandoval on 1/21/2022 1:43 PM      US GALLBLADDER RUQ   Final Result   Impression:      Cholelithiasis considerable ductal dilation      This document has been electronically signed by: Fernie Seymour MD on    01/21/2022 01:43 AM      CT CHEST WO CONTRAST   Final Result   Impression:      Bilateral pleural effusions and atelectasis with cardiomegaly. Question pulmonary edema. This document has been electronically signed by: Fernie Seymour MD on    01/20/2022 11:23 PM      All CTs at this facility use dose modulation techniques and iterative    reconstructions, and/or weight-based dosing   when appropriate to reduce radiation to a low as reasonably achievable. CT ABDOMEN PELVIS WO CONTRAST Additional Contrast? None   Final Result   Impression:      Left perinephric and renal subcapsular hematomas      Loculated left retroperitoneal fluid, question hematoma versus abscess. Anasarca pattern noted with body wall edema      This document has been electronically signed by: Fernie Seymour MD on    01/20/2022 11:29 PM      All CTs at this facility use dose modulation techniques and iterative    reconstructions, and/or weight-based dosing   when appropriate to reduce radiation to a low as reasonably achievable. XR CHEST PORTABLE   Final Result   Impression:   1. Lines and tubes as above. 2. Changes of the pulmonary parenchyma concerning for mild bilateral    pneumonia. This document has been electronically signed by: Wellington Watts MD on    01/20/2022 09:40 PM          DVT prophylaxis: [] Lovenox                                 [x] SCDs                                 [] SQ Heparin                                 [] Encourage ambulation           [] Already on Anticoagulation     Code Status: Full Code    PT/OT Eval Status:  Following    Tele:   [] yes             [x] no    Electronically signed by Kathy Johnson DO on 2/3/2022 at 1:53 PM

## 2022-02-04 ENCOUNTER — TELEPHONE (OUTPATIENT)
Dept: UROLOGY | Age: 72
End: 2022-02-04

## 2022-02-04 VITALS
DIASTOLIC BLOOD PRESSURE: 53 MMHG | WEIGHT: 144.84 LBS | HEIGHT: 57 IN | TEMPERATURE: 98.3 F | BODY MASS INDEX: 31.25 KG/M2 | HEART RATE: 87 BPM | RESPIRATION RATE: 16 BRPM | SYSTOLIC BLOOD PRESSURE: 110 MMHG | OXYGEN SATURATION: 90 %

## 2022-02-04 LAB
ANION GAP SERPL CALCULATED.3IONS-SCNC: 11 MEQ/L (ref 8–16)
BASOPHILS # BLD: 1.1 %
BASOPHILS ABSOLUTE: 0.1 THOU/MM3 (ref 0–0.1)
BUN BLDV-MCNC: 81 MG/DL (ref 7–22)
CALCIUM SERPL-MCNC: 8.9 MG/DL (ref 8.5–10.5)
CHLORIDE BLD-SCNC: 98 MEQ/L (ref 98–111)
CO2: 27 MEQ/L (ref 23–33)
CREAT SERPL-MCNC: 1.2 MG/DL (ref 0.4–1.2)
EOSINOPHIL # BLD: 5.5 %
EOSINOPHILS ABSOLUTE: 0.6 THOU/MM3 (ref 0–0.4)
ERYTHROCYTE [DISTWIDTH] IN BLOOD BY AUTOMATED COUNT: 15.8 % (ref 11.5–14.5)
ERYTHROCYTE [DISTWIDTH] IN BLOOD BY AUTOMATED COUNT: 54.4 FL (ref 35–45)
GFR SERPL CREATININE-BSD FRML MDRD: 44 ML/MIN/1.73M2
GLUCOSE BLD-MCNC: 93 MG/DL (ref 70–108)
HCT VFR BLD CALC: 28.3 % (ref 37–47)
HEMOGLOBIN: 8.6 GM/DL (ref 12–16)
IMMATURE GRANS (ABS): 0.09 THOU/MM3 (ref 0–0.07)
IMMATURE GRANULOCYTES: 0.8 %
LYMPHOCYTES # BLD: 10.3 %
LYMPHOCYTES ABSOLUTE: 1.2 THOU/MM3 (ref 1–4.8)
MCH RBC QN AUTO: 28.9 PG (ref 26–33)
MCHC RBC AUTO-ENTMCNC: 30.4 GM/DL (ref 32.2–35.5)
MCV RBC AUTO: 95 FL (ref 81–99)
MONOCYTES # BLD: 6.3 %
MONOCYTES ABSOLUTE: 0.7 THOU/MM3 (ref 0.4–1.3)
NUCLEATED RED BLOOD CELLS: 0 /100 WBC
PLATELET # BLD: 382 THOU/MM3 (ref 130–400)
PMV BLD AUTO: 9.5 FL (ref 9.4–12.4)
POTASSIUM SERPL-SCNC: 4.3 MEQ/L (ref 3.5–5.2)
RBC # BLD: 2.98 MILL/MM3 (ref 4.2–5.4)
SEG NEUTROPHILS: 76 %
SEGMENTED NEUTROPHILS ABSOLUTE COUNT: 8.5 THOU/MM3 (ref 1.8–7.7)
SODIUM BLD-SCNC: 136 MEQ/L (ref 135–145)
WBC # BLD: 11.2 THOU/MM3 (ref 4.8–10.8)

## 2022-02-04 PROCEDURE — 85025 COMPLETE CBC W/AUTO DIFF WBC: CPT

## 2022-02-04 PROCEDURE — 6370000000 HC RX 637 (ALT 250 FOR IP): Performed by: INTERNAL MEDICINE

## 2022-02-04 PROCEDURE — 80048 BASIC METABOLIC PNL TOTAL CA: CPT

## 2022-02-04 PROCEDURE — 6370000000 HC RX 637 (ALT 250 FOR IP): Performed by: NURSE PRACTITIONER

## 2022-02-04 PROCEDURE — 2580000003 HC RX 258: Performed by: NURSE PRACTITIONER

## 2022-02-04 PROCEDURE — 94640 AIRWAY INHALATION TREATMENT: CPT

## 2022-02-04 PROCEDURE — 6370000000 HC RX 637 (ALT 250 FOR IP): Performed by: STUDENT IN AN ORGANIZED HEALTH CARE EDUCATION/TRAINING PROGRAM

## 2022-02-04 PROCEDURE — 94760 N-INVAS EAR/PLS OXIMETRY 1: CPT

## 2022-02-04 PROCEDURE — 99238 HOSP IP/OBS DSCHRG MGMT 30/<: CPT | Performed by: INTERNAL MEDICINE

## 2022-02-04 PROCEDURE — 6360000002 HC RX W HCPCS: Performed by: NURSE PRACTITIONER

## 2022-02-04 RX ORDER — FERROUS SULFATE 325(65) MG
325 TABLET ORAL EVERY OTHER DAY
Qty: 30 TABLET | Refills: 1 | Status: ON HOLD | OUTPATIENT
Start: 2022-02-04 | End: 2022-07-11

## 2022-02-04 RX ORDER — FOLIC ACID 1 MG/1
1 TABLET ORAL DAILY
Qty: 30 TABLET | Refills: 3 | Status: SHIPPED | OUTPATIENT
Start: 2022-02-04

## 2022-02-04 RX ORDER — ALLOPURINOL 100 MG/1
100 TABLET ORAL DAILY
DISCHARGE
Start: 2022-02-04 | End: 2022-02-09

## 2022-02-04 RX ORDER — LANOLIN ALCOHOL/MO/W.PET/CERES
6 CREAM (GRAM) TOPICAL NIGHTLY PRN
Qty: 30 TABLET | Refills: 1 | Status: SHIPPED | OUTPATIENT
Start: 2022-02-04

## 2022-02-04 RX ORDER — CIPROFLOXACIN 250 MG/1
250 TABLET, FILM COATED ORAL
Qty: 30 TABLET | Refills: 0 | Status: SHIPPED | OUTPATIENT
Start: 2022-02-04 | End: 2022-03-06

## 2022-02-04 RX ADMIN — FLUOXETINE HYDROCHLORIDE 40 MG: 20 CAPSULE ORAL at 09:19

## 2022-02-04 RX ADMIN — BUDESONIDE AND FORMOTEROL FUMARATE DIHYDRATE 1 PUFF: 80; 4.5 AEROSOL RESPIRATORY (INHALATION) at 07:50

## 2022-02-04 RX ADMIN — DAKIN'S SOLUTION 0.125% (QUARTER STRENGTH): 0.12 SOLUTION at 09:21

## 2022-02-04 RX ADMIN — Medication 30 ML: at 09:19

## 2022-02-04 RX ADMIN — MICONAZOLE NITRATE: 2 POWDER TOPICAL at 09:19

## 2022-02-04 RX ADMIN — ALLOPURINOL 100 MG: 100 TABLET ORAL at 09:20

## 2022-02-04 RX ADMIN — FAMOTIDINE 20 MG: 20 TABLET, FILM COATED ORAL at 09:19

## 2022-02-04 RX ADMIN — SODIUM CHLORIDE, PRESERVATIVE FREE 10 ML: 5 INJECTION INTRAVENOUS at 09:20

## 2022-02-04 RX ADMIN — Medication 1 CAPSULE: at 09:19

## 2022-02-04 RX ADMIN — FOLIC ACID 1 MG: 1 TABLET ORAL at 09:19

## 2022-02-04 RX ADMIN — CIPROFLOXACIN 250 MG: 250 TABLET, FILM COATED ORAL at 09:19

## 2022-02-04 RX ADMIN — ASPIRIN 81 MG CHEWABLE TABLET 81 MG: 81 TABLET CHEWABLE at 09:20

## 2022-02-04 RX ADMIN — ONDANSETRON 4 MG: 2 INJECTION INTRAMUSCULAR; INTRAVENOUS at 06:19

## 2022-02-04 ASSESSMENT — PAIN SCALES - GENERAL: PAINLEVEL_OUTOF10: 0

## 2022-02-04 NOTE — PROGRESS NOTES
[facility] TH[facility]REAT[facility]  [facility] ICU STEPDOWN TELEMETRY 4K      Date: 2022  Patient Name: Mirian Robison        MRN: [id number]    : 1950  (73 y.o.)    REASON FOR MISSED TREATMENT: Upon ST arrival to complete dysphagia treatment session, NOAM Lubin deferred therapy -- Pt awaiting arrival of transport as the pt is prepping to d/c to another facility for continued care and rehabilitation. Werner Aguilar MA., Lobo Vaughan / [phone]

## 2022-02-04 NOTE — PROGRESS NOTES
CLINICAL PHARMACY: DISCHARGE MED RECONCILIATION/REVIEW    [facility] ([facility]) Select Patient?: No  Total # of Interventions Recommended: 0   -   Total # Interventions Accepted: 0  Intervention Severity:   - Level 1 Intervention Present?: No   - Level 2 #: 0   - Level 3 #: 0   Time Spent (min): 15    Additional Documentation:      Tamica Monzon, PharmD  11:21 AM  2/4/2022

## 2022-02-04 NOTE — TELEPHONE ENCOUNTER
Plans for discharge millerdelmi today  She will need scheduled with Dr Britney Bliss to discuss treatment of left staghorn calculus in 3-4 wks

## 2022-02-04 NOTE — CARE COORDINATION
2/4/22, 11:11 AM EST    Patient goals/plan/ treatment preferences discussed by  and . Patient goals/plan/ treatment preferences reviewed with patient/ family. Patient/ family verbalize understanding of discharge plan and are in agreement with goal/plan/treatment preferences. Understanding was demonstrated using the teach back method. AVS provided by RN at time of discharge, which includes all necessary medical information pertaining to the patients current course of illness, treatment, post-discharge goals of care, and treatment preferences. Services After Discharge  Services At/After Discharge: Aide Services,Housekeeping,Nursing Services,OT,PT [facility] [place])   IMM Letter  IMM Letter given to Patient/Family/Significant other/Guardian/POA/by[de-identified]   IMM Letter date given[de-identified] 02/02/22  IMM Letter time given[de-identified] [phone]       Patient to discharge to [place] [address] today via ambulance. SW confirmed transport time of 12:30pm.     SW called and notified Karenjericho Bacon at [place] [address] of transport time. RN made aware and discharge instructions placed on chart.

## 2022-02-04 NOTE — PROGRESS NOTES
Progress note: Infectious diseases    Patient - Jean Pierre Graff,  Age - 73 y.o.    - 1950      Room Number - 8V-48/915-Y   MRN -  [id number]   Acct # - [de-identified]  Date of Admission -  2022  8:49 PM    SUBJECTIVE:   No new issues. OBJECTIVE   VITALS    height is 4' 9\" (1.448 m) and weight is 144 lb 13.5 oz (65.7 kg). Her oral temperature is 98.3 °F (36.8 °C). Her blood pressure is 110/53 (abnormal) and her pulse is 87. Her respiration is 16 and oxygen saturation is 90%. Wt Readings from Last 3 Encounters:   22 144 lb 13.5 oz (65.7 kg)   21 168 lb 9 oz (76.5 kg)   10/25/21 164 lb (74.4 kg)       I/O (24 Hours)    Intake/Output Summary (Last 24 hours) at 2022 1031  Last data filed at 2022 0315  Gross per 24 hour   Intake 2005 ml   Output 1590 ml   Net 415 ml       General Appearance  Awake, alert, oriented,  Chronically sick looking. HEENT - normocephalic, atraumatic, pale  conjunctiva,  anicteric sclera. Neck - Supple, no mass. Lungs -  Bilateral  air entry,  Diminished breath sound.   Cardiovascular - Heart sounds are normal.     Abdomen - soft, not distended, nontender, colostomy functioning  Left side nephrostomy tube  Neurologic -oriented  Skin - No bruising or bleeding  Extremities - No edema, no cyanosis, clubbing   Stage 4 coccyx wound: dressed  MEDICATIONS:      enoxaparin  40 mg SubCUTAneous Q24H    ciprofloxacin  250 mg Oral Daily    folic acid  1 mg Oral Daily    midodrine  10 mg Per G Tube Q8H    lactobacillus  1 capsule Oral Daily with breakfast    budesonide-formoterol  1 puff Inhalation BID    miconazole   Topical BID    multivitamin+  30 mL Oral Daily    allopurinol  100 mg Oral Daily    aspirin  81 mg Oral Daily    famotidine  20 mg PEG Tube Daily    FLUoxetine  40 mg Oral Daily    [Held by provider] Riociguat  2.5 mg Oral TID    sodium hypochlorite Irrigation Daily    ferrous sulfate  325 mg Oral Q MWF    epoetin prince-epbx  4,000 Units SubCUTAneous Weekly    sodium chloride flush  5-40 mL IntraVENous 2 times per day      sodium chloride       melatonin, docusate, hydrOXYzine, senna, potassium chloride, magnesium sulfate, sodium chloride, sodium chloride flush, ondansetron **OR** ondansetron, polyethylene glycol, acetaminophen **OR** acetaminophen, sodium chloride flush      LABS:     CBC:   Recent Labs     02/02/22 0328 02/03/22  0337 02/04/22  0320   WBC 15.7* 15.4* 11.2*   HGB 9.2* 9.1* 8.6*   * 422* 382     BMP:    Recent Labs     02/02/22 0328 02/02/22  1253 02/03/22 0337 02/03/22  1256 02/04/22 0320      < > 139 137 136   K 4.1  --  4.2  --  4.3     --  98  --  98   CO2 32  --  29  --  27   BUN 93*  --  86*  --  81*   CREATININE 1.2  --  1.3*  --  1.2   GLUCOSE 122*  --  117*  --  93    < > = values in this interval not displayed. Calcium:  Recent Labs     02/04/22 0320   CALCIUM 8.9         Problem list of patient:     Patient Active Problem List   Diagnosis Code    HTN (hypertension) I10    Chronic depression F32. A    CHF (congestive heart failure) ([facility]) I50.9    Thrombocytopenia ([facility]) D69.6    Moderate episode of recurrent major depressive disorder ([facility]) F33.1    Renal failure syndrome N19    Hyperkalemia E87.5    Isolated non-nephrotic proteinuria R80.0    ALVIN (acute kidney injury) ([facility] [address] N17.9    Chronic right-sided heart failure ([facility]) I50.812    Pulmonary HTN ([facility]) I27.20    Hypotension due to hypovolemia I95.89, E86.1    Acute renal failure superimposed on stage 4 chronic kidney disease (HCC) N17.9, N18.4    Pressure injury of sacral region, stage 4 (HCC) L89.154    Acute respiratory failure (HCC) J96.00    Flash pulmonary edema (HCC) J81.0    Shock (HCC) R57.9    Aspiration pneumonia of left lung (HCC) J69.0    Pleural effusion J90    Paroxysmal atrial fibrillation (HCC) I48.0    Hemoptysis R04.2  Chronic respiratory failure with hypoxia (HCC) J96.11    Pneumothorax, right J93.9    Collapse of left lung J98.11    Abnormal chest x-ray R93.89    Acute on chronic respiratory failure with hypoxia (HCC) J96.21    Retroperitoneal hematoma K66.1    HAP (hospital-acquired pneumonia) J18.9, Y95         ASSESSMENT/PLAN   Stage 4 coccyx wound: post debridement, continue local wound care  Malnutrition. ANCA positive vasculites:   Gall stones with dilated CBD, no obstruction  Left nephrostomy tube plaed for staghorn calculi   Plan for ECF.       Baudilio Orlando MD, MD, FACP 2/4/2022 10:31 AM

## 2022-02-04 NOTE — PROGRESS NOTES
Physician Progress Note      Shasta Ruiz  CSN #:                  [id number]  :                       1950  ADMIT DATE:       2022 8:49 PM  [address] [place] DATE:        2022 2:23 PM  RESPONDING  PROVIDER #:        Kattie Osler MD          QUERY TEXT:    Attending,    Patient admitted with sepsis and noted to have a UTI. Pt presented with an   indwelling urinary catheter that was originally placed on 21 and replaced   on admission. Pt also noted to have a percutaneous nephrostomy tube that was   replaced on 22. A query was placed regarding the cause of the UTI, and   the provider responded on  stating, \"UTI is due to the chronic indwelling   urinary catheter, POA. \"  CAUTI has not been documented since. If possible,   please respond below and document in the progress notes and discharge summary   if the CAUTI was: The medical record reflects the following:  Risk Factors: chronic indwelling urinary catheter, nephrostomy tube, left   staghorn calculus, left kidney subcapsular hematoma, advanced age  Clinical Indicators: UTI in the setting of indwelling urinary catheter,   nephrostomy tube, left staghorn calculus, left kidney subcapsular hematoma  Treatment: IV ATBs, IVF, Tylenol, Urology consult, labs, imaging, urinary   catheter exchange    Thank you! Queta Rubin, RN, BSN, RHIT, CCDS  RN Clinical   [phone]  Options provided:  -- CAUTI, POA confirmed after study  -- CAUTI ruled out after study, UTI is due to, please specify. -- Other - I will add my own diagnosis  -- Disagree - Not applicable / Not valid  -- Disagree - Clinically unable to determine / Unknown  -- Refer to Clinical Documentation Reviewer    PROVIDER RESPONSE TEXT:    CAUTI, POA confirmed after study.     Query created by: Yesenia Chung on 2022 12:56 PM      Electronically signed by:  Kattie Osler MD 2022 4:33 PM

## 2022-02-04 NOTE — DISCHARGE SUMMARY
term prophylaxis.     6. Pulmonary hypertension: History of. Held Diuril during inpatient course.       7. Stage IV decubitus ulcer: General surgery consulted. Patient underwent diverting colostomy 1/27/2022.  Wound care on board. Infectious disease consult, stop meropenem. Upon discharge follow-up with surgery as needed. Follow-up with wound care upon discharge for for an 8-week colostomy.     8. 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 Recommend biopsy when medically stable.     9. Thrombocytosis, mild: Likely reactive in nature, platelet count stable.     10. Left obstructive uropathy: Nephrostomy tube placed, changed 1/12/22. Urology consulted, no further intervention. Follow-up with urology upon discharge.     11. CKD: Cr stable. Urine output adequate.     12. Macrocytic anemia: Hgb stable, Continue weekly retacrit discharge.     13. Gout: History of. Continue home dose of allopurinol upon discharge.     14. Dilated CBD: US gallbladder demonstrated CBD dilation of 14mm. MRCP with contrast reported small stone in bile duct with 8mm dilatation. GI consulted, no plans for acute intervention     15. BARBER: History of. Patient off of ventilator and tolerating nasal cannula. Follow-up outpatient. 16. Obesity: BMI 31.49      Patient overall stable, doing well on room air. She needs to follow-up with nephrology, general surgery as needed, wound care. The patient was seen and examined on day of discharge and this discharge summary is in conjunction with any daily progress note from day of discharge.     Hospital Course:   Per HPI: \"Kenzie Diop is a 72-year-old  female transferred to [facility] on 1/20/2022 with acute respiratory failure, hypoxia.  Patient is currently on a BiPAP.     Patient has past medical history significant for everyday smoker, BARBER, PAF s/p cardioversion 11/3/2021, PAH grp 3, HFpEF-ECHO 10/2021 LV 60 to 65% with trace tricuspid regurg.  RV with normal function, essential hypertension, depression, gout, osteoarthritis, renal calculi, thrombocytopenia, stage 3 sacral ulcer s/p wound ostomy. Recent [facility] hospitalization 10/27-12/1/2021 patient suffered a very lengthy hospitalization secondary to acute hypoxic and hypercapnic respiratory failure felt to be secondary to severe pneumonia with left-sided pleural effusion and repeated mucous plugging.  Patient did require tracheostomy on 11/17/2021, and de cannulated 12/23/2021.  Stage IV decubitus ulcer with suspected osteomyelitis, ALVIN likely ATN, ANCA associated Vasculitis, hydronephrosis secondary to left-sided staghorn calculi percutaneous tube was placed on 11/12/2021, bilateral pleural effusions felt to be transudative status post thoracentesis of 1 L on 12/1/2021. Transferred to [facility] 12/1/2021.      Per family noted decreased responsiveness and change in mental status over the last 2 days with vague complaint of back and arm pain.  An EKG was completed on 1/19/2022 with no acute ST changes.  And troponin evaluation revealed 0.176 and 0.155.  Patient became hypoxic and hypercapnic and was transitioned to BiPAP therapy.  1/20/2022 Dr. Rodrigo Yarbrough concern with sepsis and requested patient to be transferred to the ICU for further management and care. \"    Exam:     Vitals:  Vitals:    02/04/22 0315 02/04/22 0515 02/04/22 0747 02/04/22 0800   BP: 130/62 134/62  (!) 110/53   Pulse: 83 88  87   Resp: 20   16   Temp: 98.2 °F (36.8 °C)   98.3 °F (36.8 °C)   TempSrc: Oral   Oral   SpO2: 93%  93% 90%   Weight: 144 lb 13.5 oz (65.7 kg)      Height:         Weight: Weight: 144 lb 13.5 oz (65.7 kg)     24 hour intake/output:    Intake/Output Summary (Last 24 hours) at 2/4/2022 1327  Last data filed at 2/4/2022 0315  Gross per 24 hour   Intake 2005 ml   Output 1590 ml   Net 415 ml       General appearance:  No apparent distress, appears stated age and cooperative. HEENT:  Normal cephalic, atraumatic without obvious deformity.  Pupils equal, round, and reactive to light. Extra ocular muscles intact. Conjunctivae/corneas clear. Neck: Supple, with full range of motion. No jugular venous distention. Trachea midline. Respiratory:  Normal respiratory effort. Clear to auscultation, bilaterally without Rales/Wheezes/Rhonchi. Cardiovascular:  Regular rate and rhythm with normal S1/S2 without murmurs, rubs or gallops. Abdomen: Soft, non-tender, non-distended with normal bowel sounds. Musculoskeletal:  No clubbing, cyanosis or edema bilaterally. Full range of motion without deformity. Skin: Skin color, texture, turgor normal.  No rashes or lesions. Neurologic:  Neurovascularly intact without any focal sensory/motor deficits. Cranial nerves: II-XII intact, grossly non-focal.  Psychiatric:  Alert and oriented, thought content appropriate, normal insight  Capillary Refill: Brisk,< 3 seconds   Peripheral Pulses: +2 palpable, equal bilaterally       Labs: For convenience and continuity at follow-up the following most recent labs are provided:      CBC:    Lab Results   Component Value Date    WBC 11.2 02/04/2022    HGB 8.6 02/04/2022    HCT 28.3 02/04/2022     02/04/2022       Renal:    Lab Results   Component Value Date     02/04/2022    K 4.3 02/04/2022    K 4.0 12/01/2021    CL 98 02/04/2022    CO2 27 02/04/2022    BUN 81 02/04/2022    CREATININE 1.2 02/04/2022    CALCIUM 8.9 02/04/2022    PHOS 3.9 01/26/2022         Significant Diagnostic Studies    Radiology:   MRI ABDOMEN WO CONTRAST MRCP   Final Result   1. Small 2 mm distal common bile duct stone with mild dilatation of the    common bile duct at 8 mm (upper normal 7 mm for patient's age). No    intrahepatic biliary dilatation. 2. Cholelithiasis without gallbladder wall edema.    3. 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   4. Left subcapsular renal hematoma measuring 2.2 cm in width with    extension to the left posterior paranephric spaces is seen on recent CT. Underlying left renal stones. 5. Benign right renal cysts. 6. Partially seen bilateral pleural effusions. Mild upper abdominal    ascites. This document has been electronically signed by: Leslie Zaragoza MD on    01/26/2022 08:33 PM      CT HEAD WO CONTRAST   Final Result   1. No acute intracranial process. 2. Subcortical/periventricular white matter hypoattenuation statistically    representing changes of chronic microvascular ischemia. Global parenchymal    volume loss which is commensurate with reported age. This document has been electronically signed by: Jason Abad DO on    01/24/2022 05:49 AM      All CTs at this facility use dose modulation techniques and iterative    reconstructions, and/or weight-based dosing   when appropriate to reduce radiation to a low as reasonably achievable. XR CHEST PORTABLE   Final Result   ET tube 2.5 cm above the avelina. This document has been electronically signed by: Debora Plascencia MD on 01/24/2022 04:51 AM      XR CHEST PORTABLE   Final Result   Improving heart failure. This document has been electronically signed by: Debora Plascencia MD on 01/24/2022 12:38 AM      XR CHEST PORTABLE   Final Result   1. Stable cardiomegaly with pulmonary vascular congestion suspicious for congestive heart failure. 2. Small left-sided pleural effusion. 3. Bilateral atelectasis/infiltrate. **This report has been created using voice recognition software. It may contain minor errors which are inherent in voice recognition technology. **      Final report electronically signed by Dr. Chavez Carter on 1/23/2022 10:39 AM      XR CHEST PORTABLE   Final Result   Impression:   Cardiomegaly with pulmonary vascular congestion and bilateral pleural    effusions, similar to prior study.       This document has been electronically signed by: Tylor Post MD on    01/22/2022 04:39 AM       DUP LOWER EXTREMITY VENOUS BILATERAL   Final Result   No evidence of a DVT. **This report has been created using voice recognition software. It may contain minor errors which are inherent in voice recognition technology. **      Final report electronically signed by Dr Paloma Howell on 1/21/2022 1:43 PM      US GALLBLADDER RUQ   Final Result   Impression:      Cholelithiasis considerable ductal dilation      This document has been electronically signed by: Ulices Caruso MD on    01/21/2022 01:43 AM      CT CHEST WO CONTRAST   Final Result   Impression:      Bilateral pleural effusions and atelectasis with cardiomegaly. Question pulmonary edema. This document has been electronically signed by: Ulices Caruso MD on    01/20/2022 11:23 PM      All CTs at this facility use dose modulation techniques and iterative    reconstructions, and/or weight-based dosing   when appropriate to reduce radiation to a low as reasonably achievable. CT ABDOMEN PELVIS WO CONTRAST Additional Contrast? None   Final Result   Impression:      Left perinephric and renal subcapsular hematomas      Loculated left retroperitoneal fluid, question hematoma versus abscess. Anasarca pattern noted with body wall edema      This document has been electronically signed by: Ulices Caruso MD on    01/20/2022 11:29 PM      All CTs at this facility use dose modulation techniques and iterative    reconstructions, and/or weight-based dosing   when appropriate to reduce radiation to a low as reasonably achievable. XR CHEST PORTABLE   Final Result   Impression:   1. Lines and tubes as above. 2. Changes of the pulmonary parenchyma concerning for mild bilateral    pneumonia.       This document has been electronically signed by: Lacho Saleh MD on    01/20/2022 09:40 PM           Consults:     [address] CONSULT TO DIETITIAN  IP CONSULT TO [facility] [facility] [address] TO DOSE MEDICATION  IP CONSULT TO GENERAL SURGERY  IP CONSULT TO GI  IP CONSULT TO SOCIAL WORK  IP CONSULT TO INFECTIOUS DISEASES    Disposition: SNF  Condition at Discharge: Stable    Code Status:  Full Code     Patient Instructions:    Discharge lab work: CBC in one week  Activity: activity as tolerated  Diet: Diet NPO  ADULT TUBE FEEDING; PEG; Renal Formula; Continuous; 30; No; 300; Q 4 hours      Follow-up visits:   Theodora Kowalski MD  [address] [facility] [address]  [place] [address]    Schedule an appointment as soon as possible for a visit in 3 weeks  3-4 week follow-up     NEHA Herrera of [employer])  [facility]  [address] [facility] [address]  [phone]        [place] [facility]  [address] [facility] [id number] [place] [address] Rd.  [address] [facility]        Hector Eldridge MD  [address] [facility]  [phone]      As needed    Kendell Escobar MD  [address] [facility] [address]  [address] [address]  [phone]    In 2 weeks      [address] [facility] [address]  [address] [facility]  [phone]    Follow up with [address] upon discharge for 4 and 8 week colostomy          Discharge Medications:        Medication List      START taking these medications    ciprofloxacin 250 MG tablet  Commonly known as: CIPRO  Take 1 tablet by mouth every 24 hours     ferrous sulfate 325 (65 Fe) MG tablet  Commonly known as: IRON 325  Take 1 tablet by mouth every other day     folic acid 1 MG tablet  Commonly known as: FOLVITE  Take 1 tablet by mouth daily     melatonin 3 MG Tabs tablet  Take 2 tablets by mouth nightly as needed (anxiety, sleep)        CHANGE how you take these medications    * allopurinol 100 MG tablet  Commonly known as: ZYLOPRIM  What changed: Another medication with the same name was changed. Make sure you understand how and when to take each.      * allopurinol 100 MG tablet  Commonly known as: ZYLOPRIM  Take 1 tablet by mouth daily  What changed:   · medication strength  · how much to take         * This list has 2 medication(s) that are the same as other medications prescribed for you. Read the directions carefully, and ask your doctor or other care provider to review them with you. CONTINUE taking these medications    acetaminophen 650 MG extended release tablet  Commonly known as: TYLENOL     Adempas 2.5 MG Tabs  Generic drug: Riociguat     aspirin 81 MG tablet     bumetanide 1 MG tablet  Commonly known as: BUMEX  Take 1 tablet by mouth daily     docusate sodium 100 MG capsule  Commonly known as: COLACE     famotidine 20 MG tablet  Commonly known as: PEPCID     FLUoxetine 40 MG capsule  Commonly known as: PROZAC  Take 1 capsule by mouth daily     hydrOXYzine 25 MG tablet  Commonly known as: ATARAX     metoclopramide 5 MG/5ML solution  Commonly known as: REGLAN     metoprolol tartrate 25 MG tablet  Commonly known as: LOPRESSOR  Take 0.5 tablets by mouth 2 times daily     midodrine 5 MG tablet  Commonly known as: PROAMATINE     MULTIVITAMIN WOMEN PO     potassium chloride 10 MEQ extended release tablet  Commonly known as: KLOR-CON M  Take 1 tablet by mouth every other day     senna 8.8 MG/5ML Syrp syrup  Commonly known as: SENOKOT     sodium chloride 1 g tablet  Take 1 tablet by mouth 2 times daily     sodium hypochlorite 0.125 % Soln external solution  Commonly known as: DAKINS  Apply Dakin's moistened gauze dressings to wound twice daily and as needed.         STOP taking these medications    piperacillin-tazobactam 2-0.25 GM per 50ML IVPB  Commonly known as: ZOSYN           Where to Get Your Medications      These medications were sent to [facility] [address], [address]  [address], [address] [address]    Phone: [phone]   · ciprofloxacin 250 MG tablet  · ferrous sulfate 325 (65 Fe) MG tablet  · folic acid 1 MG tablet  · melatonin 3 MG Tabs tablet     Information about where to get these

## 2022-02-07 NOTE — TELEPHONE ENCOUNTER
Pt asked us to call back around end of February, she is having therapy at the nursing home right now.

## 2022-02-08 ENCOUNTER — TELEPHONE (OUTPATIENT)
Dept: WOUND CARE | Age: 72
End: 2022-02-08

## 2022-02-08 NOTE — TELEPHONE ENCOUNTER
Erika Juarez called wanting to schedule a 4 week follow up for patient. [facility], since the patient is a new Colostomy, she would like to see the patient in person in 2 weeks. The facility is not able to transfer the patient on given scheduled date of 2/25/22. Provided a new appointment of 3/4/22 at [address] will attempt to get transport set up for patient.

## 2022-02-09 ENCOUNTER — TELEPHONE (OUTPATIENT)
Dept: WOUND CARE | Age: 72
End: 2022-02-09

## 2022-02-09 NOTE — TELEPHONE ENCOUNTER
----- Message from Daksha Myers sent at 2/9/2022  8:23 AM EST -----   [phone] CANDY @  [facility] CALLED TO SEE IF WE CAN GET YANDY IN LATER THAN HER 8:00 APPT ON 3/4?  SHE IS BED RIDDEN AND REQUIRES EMS TRANSPORT, AND TRANSPORT DOES NOT START  UNTIL 8:45

## 2022-02-10 LAB
BUN BLDV-MCNC: 75 MG/DL
CALCIUM SERPL-MCNC: 9.1 MG/DL
CHLORIDE BLD-SCNC: 102 MMOL/L
CO2: 26 MMOL/L
CREAT SERPL-MCNC: 1.3 MG/DL
GFR CALCULATED: 41
GLUCOSE BLD-MCNC: 91 MG/DL
POTASSIUM SERPL-SCNC: 4.8 MMOL/L
SODIUM BLD-SCNC: 139 MMOL/L

## 2022-02-16 ENCOUNTER — TELEMEDICINE (OUTPATIENT)
Dept: NEPHROLOGY | Age: 72
End: 2022-02-16
Payer: MEDICARE

## 2022-02-16 DIAGNOSIS — E87.1 HYPONATREMIA: ICD-10-CM

## 2022-02-16 DIAGNOSIS — N06.9 ISOLATED PROTEINURIA WITH MORPHOLOGIC LESION: ICD-10-CM

## 2022-02-16 DIAGNOSIS — I95.89 OTHER HYPOTENSION: ICD-10-CM

## 2022-02-16 DIAGNOSIS — N18.31 STAGE 3A CHRONIC KIDNEY DISEASE (HCC): Primary | ICD-10-CM

## 2022-02-16 PROCEDURE — 3017F COLORECTAL CA SCREEN DOC REV: CPT | Performed by: INTERNAL MEDICINE

## 2022-02-16 PROCEDURE — 4040F PNEUMOC VAC/ADMIN/RCVD: CPT | Performed by: INTERNAL MEDICINE

## 2022-02-16 PROCEDURE — G8427 DOCREV CUR MEDS BY ELIG CLIN: HCPCS | Performed by: INTERNAL MEDICINE

## 2022-02-16 PROCEDURE — 1090F PRES/ABSN URINE INCON ASSESS: CPT | Performed by: INTERNAL MEDICINE

## 2022-02-16 PROCEDURE — 1111F DSCHRG MED/CURRENT MED MERGE: CPT | Performed by: INTERNAL MEDICINE

## 2022-02-16 PROCEDURE — 99213 OFFICE O/P EST LOW 20 MIN: CPT | Performed by: INTERNAL MEDICINE

## 2022-02-16 PROCEDURE — G8400 PT W/DXA NO RESULTS DOC: HCPCS | Performed by: INTERNAL MEDICINE

## 2022-02-16 PROCEDURE — 1123F ACP DISCUSS/DSCN MKR DOCD: CPT | Performed by: INTERNAL MEDICINE

## 2022-02-16 NOTE — PROGRESS NOTES
[facility]        Outpatient Follow-Up note         2/16/2022 10:01 AM    Patient Name:   Cozette Dance  YOB: 1950  Primary Care Physician:  Neeru Flores MD       TELEHEALTH EVALUATION -- Audio/Visual (During [id number] public health emergency)     Telehealth service was provided with the patient at her room at the Nursing home and myself the physician in my [address] [facility] office and my Purdy Shala Cosme who has initiated the visit. Pursuant to the emergency declaration under the [facility] waiver authority and the [employer] Act, this Virtual  Visit was conducted, with patient's consent, to reduce the patient's risk of exposure to COVID-19 and provide continuity of care for an established patient. Services were provided through a video synchronous discussion virtually to substitute for in-person clinic visit. Chief Complaint / Reason for follow-up : Follow Up of CKD / proteinuria     Interval History :  Patient seen and examined by me. Feels ok. No cp or SOB. No leg swelling   Was recently in hospital last year for almost 2 months for pneumonia, had intubation renal failure needing hemodialysis, nephrostomy tube and also a diverting colostomy as well.      Past History :  Past Medical History:   Diagnosis Date    CHF [facility]congestive heart failure) ([facility])     Dr. Bambi Miranda Depression     Gout attack     Hypertension     Osteoarthritis     Pulmonary artery hypertension ([facility] [address]     [address] [facility]-- Dr. Quinn Berger-- Dr. Bambi Miranda Renal calculi     Dr. James Vela Thrombocytopenia [facility])      Past Surgical History:   Procedure Laterality Date    APPENDECTOMY  1960    BRONCHOSCOPY N/A 11/22/2021    BRONCHOSCOPY performed by Marco A Bellamy MD at [facility]    BRONCHOSCOPY N/A 11/30/2021    BRONCHOSCOPY WITHOUT FLURO performed by Marco A Bellamy MD at [facility]    COLECTOMY N/A 1/27/2022    LAPAROSCOPY WITH DIVERTING LOOP COLOSTOMY performed by Deb Petersen MD at [facility] N/A 11/24/2021    EGD PEG TUBE PLACEMENT performed by Mckenzie Arredondo MD at [address] Endoscopy    IR [address]  11/26/2021    IR CHEST TUBE INSERTION 11/26/2021 [facility]    IR NEPHROSTOMY PERCUTANEOUS LEFT  11/1/2021    IR NEPHROSTOMY PERCUTANEOUS LEFT 11/1/2021 Marina Aponte MD [facility]    PRESSURE ULCER DEBRIDEMENT Right 8/6/2021    EXCISION RIGHT BUTTOCK WOUND AND CLOSURE performed by Ita Smith MD at [place] ,[facility]        Medications :     Outpatient Medications Marked as Taking for the 2/16/22 encounter (Telemedicine) with Teresa Ray MD   Medication Sig Dispense Refill    ciprofloxacin (CIPRO) 250 MG tablet Take 1 tablet by mouth every 24 hours 30 tablet 0    folic acid (FOLVITE) 1 MG tablet Take 1 tablet by mouth daily 30 tablet 3    ferrous sulfate (IRON 325) 325 (65 Fe) MG tablet Take 1 tablet by mouth every other day 30 tablet 1    melatonin 3 MG TABS tablet Take 2 tablets by mouth nightly as needed (anxiety, sleep) 30 tablet 1    allopurinol (ZYLOPRIM) 100 MG tablet Take 100 mg by mouth daily      famotidine (PEPCID) 20 MG tablet 20 mg by PEG Tube route daily      hydrOXYzine (ATARAX) 25 MG tablet 25 mg by PEG Tube route every 8 hours as needed for Anxiety      metoclopramide (REGLAN) 5 MG/5ML solution 5 mg by PEG Tube route every 8 hours      midodrine (PROAMATINE) 5 MG tablet 10 mg by PEG Tube route 3 times daily      senna (SENOKOT) 8.8 MG/5ML SYRP syrup Take 5 mLs by mouth nightly as needed      metoprolol tartrate (LOPRESSOR) 25 MG tablet Take 0.5 tablets by mouth 2 times daily 90 tablet 1    sodium hypochlorite (DAKINS) 0.125 % SOLN external solution Apply Dakin's moistened gauze dressings to wound twice daily and as needed.  1 Bottle 2    sodium chloride 1 g tablet Take 1 tablet by mouth 2 times daily 240 tablet 3    FLUoxetine (PROZAC) 40 MG capsule Take 1 capsule by mouth daily 90 capsule 3    potassium chloride (KLOR-CON M) 10 MEQ extended release tablet Take 1 tablet by mouth every other day 90 tablet 3    bumetanide (BUMEX) 1 MG tablet Take 1 tablet by mouth daily 60 tablet 11    Multiple Vitamins-Minerals (MULTIVITAMIN WOMEN PO) Take 1 tablet by mouth daily      acetaminophen (TYLENOL) 650 MG extended release tablet Take 650 mg by mouth every 8 hours as needed for Pain      Riociguat (ADEMPAS) 2.5 MG TABS Take 2.5 mg by mouth 3 times daily      docusate sodium (COLACE) 100 MG capsule Take 100 mg by mouth as needed       aspirin 81 MG tablet Take 81 mg by mouth daily          Vitals     There were no vitals taken for this visit. Wt Readings from Last 3 Encounters:   02/04/22 144 lb 13.5 oz (65.7 kg)   12/01/21 168 lb 9 oz (76.5 kg)   10/25/21 164 lb (74.4 kg)        Physical Exam     General -- no distress  Lungs -- clear  Heart -- S1, S2 heard, JVD - no  Abdomen - soft, non-tender  Extremities -- no edema  CNS - awake and alert      Labs, Radiology and Tests    Labs -    10/19 12/20 6/21 2/22        Sodium 125 139 138 139        Potassium 4.2 3.8 3.9 4.8        BUN 25 29 13 75        Creatinine 0.7 1.1 1.0 1.3        eGFR 83 49 55 41                    UPCR 1.18  6.0         UMCR 338  2525                       Renal Ultrasound Scan -- 10/19  Right kidney 9.7 cm left kidney 11.9 cm  1.5 cm cyst on the right kidney  Left renal left renal calculi     Assessment    1. Renal -patient has CKD stage III most likely. Recent recovery from ALVIN needing dialysis  -Volume status  is doing well continue Bumex  -Has proteinuria with ? ANCA doubt has any renal issues  2. Electrolytes-hyponatremia improved well on salt tabs . D/C for now  3. Essential hypertension-running well on midodrine  4.  History of congestive heart failure plan as mentioned above, continue bumex  5. Proteinuria unclear etiology plan as mentioned above  6. Left sided nephrostomy tube in place  7. meds reviewed and D/W patient and RN  8. FU in 4 months. Tests and orders placed this Encounter     No orders of the defined types were placed in this encounter. Anton Conley M.D  [facility].

## 2022-02-22 ENCOUNTER — HOSPITAL ENCOUNTER (OUTPATIENT)
Dept: INTERVENTIONAL RADIOLOGY/VASCULAR | Age: 72
Discharge: HOME | End: 2022-02-22
Payer: MEDICARE

## 2022-02-22 ENCOUNTER — TELEPHONE (OUTPATIENT)
Dept: UROLOGY | Age: 72
End: 2022-02-22

## 2022-02-22 VITALS
DIASTOLIC BLOOD PRESSURE: 61 MMHG | OXYGEN SATURATION: 96 % | RESPIRATION RATE: 18 BRPM | TEMPERATURE: 98 F | SYSTOLIC BLOOD PRESSURE: 137 MMHG | HEART RATE: 73 BPM

## 2022-02-22 DIAGNOSIS — N20.0 KIDNEY STONE: ICD-10-CM

## 2022-02-22 DIAGNOSIS — N20.0 KIDNEY STONE: Primary | ICD-10-CM

## 2022-02-22 PROCEDURE — 87075 CULTR BACTERIA EXCEPT BLOOD: CPT

## 2022-02-22 PROCEDURE — 87186 SC STD MICRODIL/AGAR DIL: CPT

## 2022-02-22 PROCEDURE — 87077 CULTURE AEROBIC IDENTIFY: CPT

## 2022-02-22 PROCEDURE — 2709999900 IR INTERVENTIONAL RADIOLOGY PROCEDURE REQUEST

## 2022-02-22 PROCEDURE — 87205 SMEAR GRAM STAIN: CPT

## 2022-02-22 PROCEDURE — 2580000003 HC RX 258: Performed by: RADIOLOGY

## 2022-02-22 PROCEDURE — 87147 CULTURE TYPE IMMUNOLOGIC: CPT

## 2022-02-22 PROCEDURE — 87070 CULTURE OTHR SPECIMN AEROBIC: CPT

## 2022-02-22 PROCEDURE — 6360000004 HC RX CONTRAST MEDICATION: Performed by: RADIOLOGY

## 2022-02-22 PROCEDURE — 50432 PLMT NEPHROSTOMY CATHETER: CPT

## 2022-02-22 RX ORDER — CEFAZOLIN SODIUM 1 G/3ML
INJECTION, POWDER, FOR SOLUTION INTRAMUSCULAR; INTRAVENOUS
Status: DISCONTINUED
Start: 2022-02-22 | End: 2022-02-22 | Stop reason: WASHOUT

## 2022-02-22 RX ORDER — SODIUM CHLORIDE 450 MG/100ML
INJECTION, SOLUTION INTRAVENOUS CONTINUOUS
Status: CANCELLED | OUTPATIENT
Start: 2022-02-22

## 2022-02-22 RX ORDER — SODIUM CHLORIDE 450 MG/100ML
INJECTION, SOLUTION INTRAVENOUS CONTINUOUS
Status: DISCONTINUED | OUTPATIENT
Start: 2022-02-22 | End: 2022-02-23 | Stop reason: HOSPADM

## 2022-02-22 RX ADMIN — IOTHALAMATE MEGLUMINE 10 ML: 600 INJECTION INTRAVASCULAR at 13:12

## 2022-02-22 RX ADMIN — SODIUM CHLORIDE: 4.5 INJECTION, SOLUTION INTRAVENOUS at 12:59

## 2022-02-22 ASSESSMENT — PAIN SCALES - GENERAL: PAINLEVEL_OUTOF10: 0

## 2022-02-22 NOTE — TELEPHONE ENCOUNTER
Nursing Home could not get the patient by squad to take her for her procedure so they are going to take her to ER.  She is scheduled for the replacement at 1130 am but not sure what will happen when she gets to ER

## 2022-02-22 NOTE — PROGRESS NOTES
1220 pt arrived via stretcher for nephrostomy tube placement due to previous one falling out. Pt sister and brother at bedside. Procedure explained and questions answered. We talked with JOSHUA and her  time will be 2 pm today after procedure. Iv started, no labs ordered.      1245 pt picked up for procedure

## 2022-02-22 NOTE — OP NOTE
Department of Radiology  Post Procedure Progress Note      Pre-Procedure Diagnosis:  Staghorn calculus    Procedure Performed:  Image guided replacement of a PCN which was recently inadvertently removed. Anesthesia: None    Findings: successful    Immediate Complications:  None    Estimated Blood Loss: minimal    SEE DICTATED PROCEDURE NOTE FOR COMPLETE DETAILS.     Electronically signed by Marisol Alanis MD on 2/22/2022 at 1:17 PM

## 2022-02-22 NOTE — PROGRESS NOTES
Patient being discharged in stable condition by cart withLACP. Written and verbal instructions given to patient and called to patients ECF, they voice no questions or concerns.

## 2022-02-22 NOTE — PROGRESS NOTES
1254 Pt in specials radiology for left nephrostomy tube replacement. Explained procedure to pt and pt verbalizes understanding. Consent signed. 18 Dr Dewitt Spurling to speak to pt.  1300 Pt positioned prone on table. 36 Left nephrostomy tube old site prepped and draped. 1311 8 fr nephrostomy tube inserted in left kidney per Dr Dewitt Spurling. Pt tolerated well. 1314  Nephrostomy tube secured with stay-fix dressing and op-site. Site without redness, swelling or hematoma. 14 ml blood tinged purulent urine obtained and sent to lab. Connected to leg bag.  1319 Pt positioned on cart for comfort. 1323 Pt transferred to OPN per cart. Report called to Libby Burns.

## 2022-02-22 NOTE — TELEPHONE ENCOUNTER
Please schedule the patient with Dr Dona Salazar for her 3-4 discharge from hospital appointment.  She needs to be seen having issues

## 2022-02-22 NOTE — H&P
Formulation and discussion of sedation / procedure plans, risks, benefits, side effects and alternatives with patient and/or responsible adult completed.     Electronically signed by Jessy Monte MD on 2/22/2022 at 1:17 PM

## 2022-02-27 LAB
AEROBIC CULTURE: ABNORMAL
ANAEROBIC CULTURE: ABNORMAL
GRAM STAIN RESULT: ABNORMAL
ORGANISM: ABNORMAL
ORGANISM: ABNORMAL

## 2022-02-28 ENCOUNTER — TELEPHONE (OUTPATIENT)
Dept: UROLOGY | Age: 72
End: 2022-02-28

## 2022-02-28 RX ORDER — DOXYCYCLINE HYCLATE 100 MG
100 TABLET ORAL 2 TIMES DAILY
Qty: 20 TABLET | Refills: 0 | Status: SHIPPED | OUTPATIENT
Start: 2022-02-28 | End: 2022-03-04

## 2022-03-04 ENCOUNTER — HOSPITAL ENCOUNTER (OUTPATIENT)
Dept: WOUND CARE | Age: 72
Discharge: HOME | End: 2022-03-04
Payer: COMMERCIAL

## 2022-03-04 VITALS
WEIGHT: 139 LBS | HEART RATE: 81 BPM | SYSTOLIC BLOOD PRESSURE: 120 MMHG | BODY MASS INDEX: 29.99 KG/M2 | HEIGHT: 57 IN | RESPIRATION RATE: 16 BRPM | DIASTOLIC BLOOD PRESSURE: 56 MMHG | TEMPERATURE: 97.7 F | OXYGEN SATURATION: 99 %

## 2022-03-04 DIAGNOSIS — L89.154 PRESSURE INJURY OF SACRAL REGION, STAGE 4 (HCC): Primary | ICD-10-CM

## 2022-03-04 DIAGNOSIS — Z93.3 COLOSTOMY IN PLACE (HCC): ICD-10-CM

## 2022-03-04 DIAGNOSIS — L30.4 INTERTRIGINOUS DERMATITIS ASSOCIATED WITH MOISTURE: ICD-10-CM

## 2022-03-04 DIAGNOSIS — L30.8 PERISTOMAL DERMATITIS ASSOCIATED WITH MOISTURE: ICD-10-CM

## 2022-03-04 DIAGNOSIS — Z93.1 PEG (PERCUTANEOUS ENDOSCOPIC GASTROSTOMY) STATUS (HCC): ICD-10-CM

## 2022-03-04 PROCEDURE — 17250 CHEM CAUT OF GRANLTJ TISSUE: CPT | Performed by: NURSE PRACTITIONER

## 2022-03-04 PROCEDURE — 17250 CHEM CAUT OF GRANLTJ TISSUE: CPT

## 2022-03-04 PROCEDURE — 2500000003 HC RX 250 WO HCPCS: Performed by: NURSE PRACTITIONER

## 2022-03-04 PROCEDURE — 99215 OFFICE O/P EST HI 40 MIN: CPT | Performed by: NURSE PRACTITIONER

## 2022-03-04 PROCEDURE — 99215 OFFICE O/P EST HI 40 MIN: CPT

## 2022-03-04 PROCEDURE — 6370000000 HC RX 637 (ALT 250 FOR IP): Performed by: NURSE PRACTITIONER

## 2022-03-04 RX ORDER — LIDOCAINE HYDROCHLORIDE 20 MG/ML
JELLY TOPICAL ONCE
Status: CANCELLED | OUTPATIENT
Start: 2022-03-04 | End: 2022-03-04

## 2022-03-04 RX ORDER — LIDOCAINE 50 MG/G
OINTMENT TOPICAL ONCE
Status: CANCELLED | OUTPATIENT
Start: 2022-03-04 | End: 2022-03-04

## 2022-03-04 RX ORDER — GINSENG 100 MG
CAPSULE ORAL ONCE
Status: CANCELLED | OUTPATIENT
Start: 2022-03-04 | End: 2022-03-04

## 2022-03-04 RX ORDER — BACITRACIN ZINC AND POLYMYXIN B SULFATE 500; 1000 [USP'U]/G; [USP'U]/G
OINTMENT TOPICAL ONCE
Status: CANCELLED | OUTPATIENT
Start: 2022-03-04 | End: 2022-03-04

## 2022-03-04 RX ORDER — BACITRACIN, NEOMYCIN, POLYMYXIN B 400; 3.5; 5 [USP'U]/G; MG/G; [USP'U]/G
OINTMENT TOPICAL ONCE
Status: CANCELLED | OUTPATIENT
Start: 2022-03-04 | End: 2022-03-04

## 2022-03-04 RX ORDER — CLOBETASOL PROPIONATE 0.5 MG/G
OINTMENT TOPICAL ONCE
Status: CANCELLED | OUTPATIENT
Start: 2022-03-04 | End: 2022-03-04

## 2022-03-04 RX ORDER — LIDOCAINE 40 MG/G
CREAM TOPICAL ONCE
Status: CANCELLED | OUTPATIENT
Start: 2022-03-04 | End: 2022-03-04

## 2022-03-04 RX ORDER — LIDOCAINE HYDROCHLORIDE 40 MG/ML
SOLUTION TOPICAL ONCE
Status: CANCELLED | OUTPATIENT
Start: 2022-03-04 | End: 2022-03-04

## 2022-03-04 RX ORDER — BETAMETHASONE DIPROPIONATE 0.05 %
OINTMENT (GRAM) TOPICAL ONCE
Status: CANCELLED | OUTPATIENT
Start: 2022-03-04 | End: 2022-03-04

## 2022-03-04 RX ORDER — GENTAMICIN SULFATE 1 MG/G
OINTMENT TOPICAL ONCE
Status: CANCELLED | OUTPATIENT
Start: 2022-03-04 | End: 2022-03-04

## 2022-03-04 RX ADMIN — SILVER NITRATE APPLICATORS 1 EACH: 25; 75 STICK TOPICAL at 10:13

## 2022-03-04 RX ADMIN — MICONAZOLE NITRATE: 2 POWDER TOPICAL at 10:19

## 2022-03-04 ASSESSMENT — PAIN SCALES - GENERAL: PAINLEVEL_OUTOF10: 0

## 2022-03-04 NOTE — PROGRESS NOTES
Clinical Level of Care Assessment    Outpatient Ostomy Care      NAME:  Shonda Herbert OF BIRTH:  1950  MEDICAL RECORD NUMBER:  [id number]   DATE:  3/4/2022      Patient Paulina Guo Assessment- Document in Flowsheet I&O   Points   Review of chart []   0   Assess Complete Ostomy tab in Navigator for assessment of; stoma status, peristomal skin, presence of hernia/stool consistency/diet/related medications   Simple adjustments to pouch size/pouch system, new stoma pattern, accessory addition/deletion. []   1   Assess Complete Ostomy tab in Navigator for assessment of; stoma status, peristomal skin, presence of hernia/stool consistency/diet/related medications   Moderate adjustments to pouch size/pouch system, new stoma pattern, accessory addition/deletion. Observe patient/caregiver with hands-on care. 1-2 adjustments to pouch size/system/skin care/accessory addition or deletion. []   2   Assess Complete Ostomy tab in Navigator for assessment of; stoma status, peristomal skin, presence of hernia/stool consistency/diet/related medications   Complex adjustments to pouch size/pouch system, new stoma pattern, accessory addition/deletion. 3 or more complex adjustments to pouch size/system/skin care/accessory addition or deletion. Observe patient/caregiver with hands-on care. Assess patient/patient abdomen for optimal pre-marked stoma site. Assess patient abdomen for type of hernia belt/accessory needed. [x]   3         Ambulation Status Documented in CN Clinical Note  Status Definition Points   Independent Independently able to ambulate. Fully able (without any assistance) to get on/off exam table/chair. []   0   Minimal Physical Assistance Requires physical assistance of one person to ambulate and/or position patient to be examined. Includes necessary physical assistance to position lower extremities on/off stool.    []   1   Moderate Physical Assistance Requires at least one staff member to physically assist patient in ambulating into treatment room, and/or on off chair/bed. Requires assistance to bathroom. []   2   Full Assistance Requires assistance of at least two staff members to transfer patient into treatment room and/or on/off bed/chair. \"Total Transfer\". Unable to use bathroom requires bedside commode and/or bedpan [x]   3       Teaching Effort Documented in [facility] Clinical Note  Effort Definition Points   No Teaching  []   0   General Initial/Simple lesson:  Assess readiness to learn, assess patient learning style to determine educational flow/special needs for learning. Teaching related to 1-3 topics  Documentation in [facility] completed. []   1   Intermediate Assess readiness to learn, assess patient learning style to determine educational flow/special needs for learning. Teaching related to 3-4 topics. Hernia belt application and care considerations  Documentation in [facility] completed. []   2   Complex Assess readiness to learn, assess patient learning style to determine educational flow/special needs for learning. Teaching of greater than 5 additional topics   Pre-operative ostomy education with review of written resources for patient/family/caregiver as needed. Demonstration/return demonstration of ostomy irrigation  Documentation in [facility] completed. [x]   3     Patient Assessment and Planning in [facility] Clinical Note   Planning Definition Points   Simple Simple pouch change procedure completed and reviewed with patient/family/caregiver   Documentation in [facility] completed. []   1   Intermediate Moderate level of follow-up needs:   Pouch change/discharge procedure revised and reviewed with patient/caregiver. Communications with outside resources; i.e. Telephone calls to Surgeon/ PCP, family/caregiver, home health, ECF. Documentation in [facility] completed.      []   2   Complex Complex level of instructions/changes:   Family/Caregiver learning/demonstration/return demonstration visit. Pouching/discharge procedure revised/reviewed with patient/family/caregiver. Contact with outside resources; i.e. communication with Surgeon/ PCP, home health, ECF. Contact/referral to ostomy appliance supplier for new or additional products. Review when to call [facility] or schedule follow-up visit. Referral to Emergency Department   Documentation in [facility] completed. [x]   3       Is this the Patient's First Visit with [facility] @ [facility]? No    Is this Patient Established to this [facility] within the last 3 years?    Yes             Clinical Level of Care      Points  0-3  Level 1 []     Points  4-6  Level 2 []     Points  7-8  Level 3 []     Points  9-10  Level 4 []     Points  11-12  Level 5 [x]       Electronically signed by Olena Brewster RN on 3/4/2022 at 10:45 AM

## 2022-03-04 NOTE — PLAN OF CARE
Problem: Wound:  Goal: Skin integrity intact  Outcome: Ongoing   Patient presents to wound and ostomy clinic for hospital follow up appointment for coccyx wound and new colostomy. Silver nitrate applied around peg tube site today. Discharge instructions/orders signed by provider and sent in packet with patient and transport back to [facility] [id number]. Coccyx- Continue dakins dressing until wound vac is received. Facility to order ostomy supplies as listed below. Colostomy pouch to be changed twice weekly and as needed if leaking. Follow up: 3 weeks on Monday March 28th at 1:00 pm    Supplies   Size   Order #     Lester Soft Convex Drainable Pouch Cut to fit- cut to oval shape of 29/38 mm [id number]   Adapt ostomy belt Medium  23\"-43\" 7300   Adapt paste  [id number]   Brava Elastic Barrier Strips  [id number]     Brava Stoma Powder      [id number]     Care plan reviewed with patient and sister. Patient and sister verbalize understanding of the plan of care and contribute to goal setting.

## 2022-03-04 NOTE — PROGRESS NOTES
[facility] and Procedure Note      Tamera Louise  MEDICAL RECORD NUMBER:  [id number]  AGE: 73 y.o. GENDER: female  : 1950  EPISODE DATE:  3/4/2022    SUBJECTIVE:     Chief Complaint   Patient presents with    Wound Check     Coccyx    Other     Ostomy        HISTORY OF PRESENT ILLNESS      Tamera Louise is a 73 y.o. female who presents today for evaluation of sacral wound and ostomy care. Patient previously followed me for wound, was healing nicely. Since that time she had lengthy hospital stay related to respiratory failure, spent some time at [facility] and has since been discharged to [address] [place] [address]. Sacral wound worsened during stay requiring surgical debridement and diverting colostomy. Peg tube was inserted for nutritional needs, patient reports that she has recently started taking small amounts in by mouth. Patient is mostly bedbound, has been working with therapy at facility. Has low air loss mattress and Roho cushion at facility. She denies pain to the wound. Current wound care includes Dakin's moistened gauze dressings, changed twice daily. Current ostomy care includes 1 piece flat pouch, leaking on arrival.  Large amount of liquid brown stool in pouch. Patient states that she has been feeling well, denies any fevers or chills. No further needs or concerns identified.     PAST MEDICAL HISTORY             Diagnosis Date    CHF (congestive heart failure) ([facility])     Dr. Vivien Mcclelland Depression     Gout attack     Hypertension     Osteoarthritis     Pulmonary artery hypertension ([facility] [address] [address]     [facility]-- Dr. Erlin Subramanian-- Dr. Vivien Mcclelland Renal calculi      [facility]        PAST SURGICAL HISTORY     Past Surgical History:   Procedure Laterality Date    APPENDECTOMY  1960    BRONCHOSCOPY N/A 2021    BRONCHOSCOPY performed by Dano Mccarthy MD at [address] 2021    BRONCHOSCOPY WITHOUT FLURO performed by Michelle Davis MD at [facility]    COLECTOMY N/A 1/27/2022    LAPAROSCOPY WITH DIVERTING LOOP COLOSTOMY performed by Claudy Leija MD at [place] [address] N/A 11/24/2021    EGD PEG TUBE PLACEMENT performed by Milind Westbrook MD at [facility]    IR [address]  11/26/2021    IR CHEST TUBE INSERTION 11/26/2021 [facility]    IR NEPHROSTOMY PERCUTANEOUS LEFT  11/1/2021    IR NEPHROSTOMY PERCUTANEOUS LEFT 11/1/2021 Den Lopez MD [facility]    PRESSURE ULCER DEBRIDEMENT Right 8/6/2021    EXCISION RIGHT BUTTOCK WOUND AND CLOSURE performed by Marizol Ramsey MD at [address] [place] [address] HISTORY     Family History   Problem Relation Age of Onset   Emeli Candelaria Diabetes Father    Emeli Candelaria Arthritis Mother    Eemli Candelaria COPD Mother     Diabetes Sister     Heart Disease Maternal Uncle     Breast Cancer Niece 36    Sleep Apnea Brother     Asthma Neg Hx     Birth Defects Neg Hx     Cancer Neg Hx     Depression Neg Hx     Early Death Neg Hx     Hearing Loss Neg Hx     High Blood Pressure Neg Hx     High Cholesterol Neg Hx     Kidney Disease Neg Hx     Learning Disabilities Neg Hx     Mental Illness Neg Hx     Mental Retardation Neg Hx     Miscarriages / Stillbirths Neg Hx     Stroke Neg Hx     Substance Abuse Neg Hx     Vision Loss Neg Hx     Other Neg Hx        SOCIAL HISTORY     Social History     Tobacco Use    Smoking status: Never Smoker    Smokeless tobacco: Never Used   Vaping Use    Vaping Use: Never used   Substance Use Topics    Alcohol use: No    Drug use: No       ALLERGIES     No Known Allergies    MEDICATIONS     Current Outpatient Medications on File Prior to Encounter   Medication Sig Dispense Refill    ciprofloxacin (CIPRO) 250 MG tablet Take 1 tablet by mouth every 24 hours 30 tablet 0    folic acid (FOLVITE) 1 MG tablet Take 1 tablet by mouth daily 30 tablet 3    ferrous sulfate (IRON 325) 325 (65 Fe) MG tablet Take 1 tablet by mouth every other day 30 tablet 1    allopurinol (ZYLOPRIM) 100 MG tablet Take 100 mg by mouth daily      famotidine (PEPCID) 20 MG tablet 20 mg by PEG Tube route daily      metoclopramide (REGLAN) 5 MG/5ML solution 5 mg by PEG Tube route every 8 hours      midodrine (PROAMATINE) 5 MG tablet 10 mg by PEG Tube route 3 times daily      metoprolol tartrate (LOPRESSOR) 25 MG tablet Take 0.5 tablets by mouth 2 times daily 90 tablet 1    sodium hypochlorite (DAKINS) 0.125 % SOLN external solution Apply Dakin's moistened gauze dressings to wound twice daily and as needed. 1 Bottle 2    sodium chloride 1 g tablet Take 1 tablet by mouth 2 times daily 240 tablet 3    FLUoxetine (PROZAC) 40 MG capsule Take 1 capsule by mouth daily 90 capsule 3    potassium chloride (KLOR-CON M) 10 MEQ extended release tablet Take 1 tablet by mouth every other day 90 tablet 3    bumetanide (BUMEX) 1 MG tablet Take 1 tablet by mouth daily 60 tablet 11    Multiple Vitamins-Minerals (MULTIVITAMIN WOMEN PO) Take 1 tablet by mouth daily      Riociguat (ADEMPAS) 2.5 MG TABS Take 2.5 mg by mouth 3 times daily      aspirin 81 MG tablet Take 81 mg by mouth daily       melatonin 3 MG TABS tablet Take 2 tablets by mouth nightly as needed (anxiety, sleep) 30 tablet 1    hydrOXYzine (ATARAX) 25 MG tablet 25 mg by PEG Tube route every 8 hours as needed for Anxiety      senna (SENOKOT) 8.8 MG/5ML SYRP syrup Take 5 mLs by mouth nightly as needed      acetaminophen (TYLENOL) 650 MG extended release tablet Take 650 mg by mouth every 8 hours as needed for Pain      docusate sodium (COLACE) 100 MG capsule Take 100 mg by mouth as needed        No current facility-administered medications on file prior to encounter.        REVIEW OF SYSTEMS:     Constitutional: Denies fever, chills, night sweats  Respiratory: Denies shortness of breath, cough, wheezing, dyspnea with exertion  Cardiovascular:Denies chest pain, palpitations, edema  Gastrointestinal: +colostomy, PEG tube Denies nausea, vomiting, constipation, diarrhea, abdominal pain   DARÍO: +nephrostomy tube, saeman catheter  Musculoskeletal: +generalized muscle weakness   Endocrine: Denies polyuria, polydipsia, cold or heat intolerance  Hematology: Denies easy brusing or bleeding, hx of clotting disorder  Dermatology: +wound Denies skin rash, eczema, pruritis    PHYSICAL EXAM:     BP (!) 120/56   Pulse 81   Temp 97.7 °F (36.5 °C) (Infrared)   Resp 16   Ht 4' 9\" (1.448 m)   Wt 139 lb (63 kg)   SpO2 99%   BMI 30.08 kg/m²   Wt Readings from Last 3 Encounters:   03/04/22 139 lb (63 kg)   02/22/22 133 lb (60.3 kg)   02/04/22 144 lb 13.5 oz (65.7 kg)     General:  Awake, alert, no apparent distress. Chronically ill appearing  HEENT: conjuctivae are clear without exudate or hemorrhage, anicteric sclera, moist oral mucosa. Chest:  Respirations regular, non-labored. Chest rise and fall equal bilaterally. GI/:  Soft, non tender to palpation. Right upper quadrant PEG tube, large amount of hypergranular tissue noted to insertion site, bleeding. Seaman catheter draining yellow urine with mucus stranding. Left flank nephrostomy tube with dark kings drainage. Mild erythema to abdominal, groin, and breast folds. No drainage or open wounds noted. Neurological: Awake, alert  Psychiatric:  Appropriate mood and affect  Extremities: non-traumatic in appearance. Skin:  Warm and dry  Wound:    Sacral wound bed granular with slough and purple discoloration. Moderate amounts of serosanguinous drainage noted. Periwound denuded with hyperpigmentation and blanchable erythema.       Ostomy type: TYPE OF OSTOMY: Colostomy  Ostomy location: left upper quadrant  Stoma size: oval, 29 mm x 38 mm  Stoma description: Light red, flush, sutures in place  Katey stomal skin: red, irritated and denuded  Mucocutaneous junction: intact  Stool color and consistency: Arpit [place] and Liquid  Stool amount: large  Abdomen is soft. Wound 08/24/21 Sacrum DTI Coccyx (has been been debrided 8/6/21)  (Active)   Wound Image   03/04/22 0940   Wound Etiology Pressure Stage  4 03/04/22 0940   Dressing Status Intact; New dressing applied 03/04/22 0940   Wound Cleansed Cleansed with saline 03/04/22 0940   Dressing/Treatment Roll gauze; Moisten with saline;Packing;ABD;Tape change 03/04/22 0940   Offloading for Diabetic Foot Ulcers Offloading not required 03/04/22 0940   Wound Length (cm) 5.8 cm 03/04/22 0940   Wound Width (cm) 8.3 cm 03/04/22 0940   Wound Depth (cm) 2.1 cm 03/04/22 0940   Wound Surface Area (cm^2) 48.14 cm^2 03/04/22 0940   Change in Wound Size % (l*w) -83.39 03/04/22 0940   Wound Volume (cm^3) 101.094 cm^3 03/04/22 0940   Wound Healing % -1 03/04/22 0940   Distance Tunneling (cm) 4 cm 01/29/22 2300   Tunneling Position ___ O'Clock 12 01/29/22 2300   Undermining Starts ___ O'Clock 9 03/04/22 0940   Undermining Ends___ O'Clock 1 03/04/22 0940   Undermining Maxium Distance (cm) 2.7 03/04/22 0940   Wound Assessment Granulation tissue;Pale granulation tissue;Slough; Purple/maroon 03/04/22 0940   Drainage Amount Moderate 03/04/22 0940   Drainage Description Serosanguinous 03/04/22 0940   Odor Mild 03/04/22 0940   Katey-wound Assessment Denuded;Dry/flaky; Hyperpigmented;Blanchable erythema 03/04/22 0940   Margins Attached edges; Unattached edges 03/04/22 0940   Wound Thickness Description not for Pressure Injury Full thickness 03/04/22 0940   Number of days: 192         LABS/IMAGING     UA: No results for input(s): Yeison Adam, COLORU, CLARITYU, MUCUS, PROTEINU, BLOODU, RBCUA, WBCUA, BACTERIA, NITRU, GLUCOSEU, BILIRUBINUR, UROBILINOGEN, KETUA, LABCAST, LABCASTTY, AMORPHOS in the last 72 hours.     Invalid input(s): CRYSTALS  Micro:   Lab Results   Component Value Date    BC No growth-preliminary No growth  01/21/2022       ASSESSMENT     -Stage IV pressure injury, sacrum  -Colostomy care  -Peristomal dermatitis associated with moisture  -Attention to PEG tube  -Hypergranulation  -Intertriginous dermatitis    Ulcer Identification:  Ulcer Type: pressure  Contributing Factors: chronic pressure, decreased mobility and shear force    Depth of Diabetic/Pressure/Non Pressure Ulcers or Wound:  Pressure ulcer, Stage 4     Patient Active Problem List   Diagnosis Code    HTN (hypertension) I10    Chronic depression F32. A    CH[facility]estive heart failure) ([facility] I50.9    Thrombocytopenia ([facility]) D69.6    Moderate episode of recurrent major depressive disorder ([facility]) F33.1    Renal failure syndrome N19    Hyperkalemia E87.5    Isolated non-nephrotic proteinuria R80.0    ALVIN (acute kidney injury) ([facility] N17.9    Chronic right-sided heart failure ([facility]) I50.812    Pulmonary HTN ([facility]) I27.20    Hypotension due to hypovolemia I95.89, E86.1    Acute renal failure superimposed on stage 4 chronic kidney disease ([facility]) N17.9, N18.4    Pressure injury of sacral region, stage 4 ([facility]) L89.154    Acute respiratory failure ([facility]) J96.00    Flash pulmonary edema ([facility]) J81.0    Shock (HCC) R57.9    Aspiration pneumonia of left lung ([facility]) J69.0    Pleural effusion J90    Paroxysmal atrial fibrillation ([facility]) I48.0    Hemoptysis R04.2    Chronic respiratory failure with hypoxia ([facility]) J96.11    Pneumothorax, right J93.9    Collapse of left lung J98.11    Abnormal chest x-ray R93.89    Acute on chronic respiratory failure with hypoxia ([facility]) J96.21    Retroperitoneal hematoma K66.1    HAP (hospital-acquired pneumonia) J18.9, Y95    Colostomy in place ([facility]) Z93.3    PEG [facility]stomy) [facility] Z93.1       PROCEDURE NOTE     Chemical Cautery    Performed by: LANE Allen - CNP    Consent obtained?  Yes    Time out taken: Yes    Tissue:  [x] Hypergranulation   [] Hyperkeratotic      Pain Control:   n/a    Method: silver nitrate    Location of Chemical Cautery:   PEG tube insertion site hypergranulation- right upper quadrant     Procedural Pain: 0  / 10     Post Procedural Pain: 0 / 10     Response to treatment: Well tolerated by patient. PLAN     Patient examined and evaluated. All available lab work, radiology studies, and progress notes pertaining to Clarisse Varner reviewed prior to or during patient visit today.    -Stage IV pressure injury, sacrum- Wound bed granular, no indications of infection today. Will stop use of Dakin's moistened gauze. Start negative pressure wound therapy to help promote healing and decrease pain with frequent dressing changes. Educated Clarisse Varner on the importance of offloading wound(s) for wound healing/prevention. Pressure reduction techniques reviewed. Continue low air loss mattress and Roho cushion. Patient verbalized understanding.      -Colostomy care- Pouching system leaking on arrival.  Flange cut too large for stoma leading to irritation of peristomal skin. Stop use of flat flange. Start use of 1 piece, cut to fit, soft convex pouching system with stoma paste and barrier extenders. Change twice weekly and as needed if leaking. Discussed importance of emptying pouch when 1/3 full. Clean peristomal skin with water only. -Peristomal dermatitis associated with moisture- Peristomal skin denuded, erythematous. Start use of stoma powder to area to promote healing. Adjust fit of flange as above to help decrease exposure to stool.     -Attention to PEG tube, Hypergranulation- Skin at insertion site hypergranular, bleeding. Chemical cautery completed today to help promote healing. Advised patient and visitor that she will have grey drainage from site for the next few days, this is normal.  Recommend securing tube to decrease friction at insertion site from bumper.      -Intertriginous dermatitis- Abdominal, groin, and breast folds had large amounts of barrier cream present on arrival today.   Recommend discontinuing this as dressing/foam    For wounds that need additional secondary dressing to help pad or control additional drainage/exudates, add foam, absorbent pad or hydrocolloid    For wounds with suspected or known infection, apply antimicrobial mesh and/or antimicrobial alginate/hydrofiber, or antimicrobial solution moistened gauze/kerlex, or equivalent and cover with secondary dressing/foam    Compression Management needed for edema control, apply multilayer compression or tubular garment or equivalent    Offloading Management needed for pressure relief, apply offloading shoe/boot or equivalent     Standing Status:   Standing     Number of Occurrences:   1       I spent a total of 60 minutes reviewing previous notes, test results and face to face with Erin Matias  on 3/4/2022. Greater than 50% of this time was spent on counseling, reviewing and discussing test results, answering questions, instructions on treatment and/or medications ordered, and coordinating the care based on my plan and assessment as noted. Discharge Instructions         Discharge Instructions         Visit Discharge/Physician Orders:  - Turn and reposition at least every 2 hours. - Limit time up in chair to 1 hour intervals for 3 times daily maximum.  - Order a wound vac for coccyx to start as soon as possible. - Continue twice daily Dakin's dressing to coccyx until wound vac is received. - Low airloss mattress to bed and roho cushion in chair.   - Continue silicone border foam dressing to heels twice weekly to protect skin. Continue offloading boots on heels while in bed or chair, do not wear for walking.  - Discontinue creams to groin skin folds and under breasts. Keep areas clean and dry and apply antifungal powder daily as needed. - Silver nitrate applied around peg tube site, will appear gray in color and this is normal. Apply new split gauze daily and as needed. [facility] Care:  [facility]    Wound Location: Coccyx    Dressing orders:     1) Gather wound care supplies and arrange on clean table. 2) Wash your hands with soap and water or use alcohol based hand  for 20 seconds (sing \"Happy Birthday\" twice). 3) Cleanse wounds with normal saline or wound cleanser and gauze. Pat dry with clean gauze. 4) Coccyx- Apply skin prep to favio wound. Cut foam to fit into wound bed. Apply clear dressing or stoma wafer to favio wound to protect good skin. Apply foam to wound bed followed by clear drape. Cut quarter size hole in center of drape over sponge area and apply vac suction. Run wound vac at 125 mmHg continuous suction. Apply extra clear drape as needed if leaks noted. Change canister as needed when full. Change wound vac three times weekly. Keep all dressings clean & dry. Do not shower, take baths or get wound wet, unless otherwise instructed by your Wound Care doctor. Visit Discharge/ physician orders for Colostomy:  - Facility to order ostomy supplies as listed below. Supplies   Size   Order #     Hillary Soft Convex Drainable Pouch Cut to fit- cut to oval shape of 29/38 mm 8958   Adapt ostomy belt Medium  23\"-43\" 7300   Adapt paste  [id number]   Brava Elastic Barrier Strips  [id number]     Brava Stoma Powder      42765       Change your flange     2   times / week and as needed if leaking    Application of one Piece Colostomy Pouch:  1. Assemble above supplies in order of application before removing pouch. 2. Cut a hole in the flange to fit the size of your stoma. Remove paper backing. 3. Remove your worn appliance by gently pulling away from skin and discard. 4. Wash skin with warm water, rinse and pat dry. DO NOT USE SOAP!  5. Inspect your skin for redness or irritation. If present, apply a small amount of powder to skin around stoma. Brush off excess powder with a Kleenex. Do not use ointments. __x_ Rand Zendejashead Stoma Powder (97531)         (for wet, weepy skin)  6. Center the flange around your stoma.   Smooth the pressure injuries. · Sliding down in a bed or chair, forcing the skin to fold over itself (shear force). · Being pulled across bed sheets or other surfaces (friction burns). As we get older, our skin gets more thin and dry and less elastic, so it is easier to damage. Poor nutrition and not getting enough fluids make these natural changes in the skin worse. Skin in this condition may easily develop a pressure injury. Skin can also be damaged by sweat, feces, or urine, making pressure injuries more likely and harder to heal.    How can you help prevent pressure injuries? If you are not able to do these things yourself, ask a family member or friend for help. Change position often  1. In a bed, change position every 2 hours. 2. In a wheelchair or other type of chair, shift your weight every 15 minutes, and give yourself a full relief of weight every hour. ? For a weight shift, lean forward and to the left and right. Push up out of the chair with your arms. If you have a chair that tilts, use the tilt control to help relieve pressure. ? For a full relief of weight, stand up or move to another chair or bed if you are able to. Personal care  · Check your skin every day, especially around bony areas. When a pressure injury is forming, skin temperature can be different than nearby skin. It might be warmer or cooler. The skin can feel either firmer or softer than the surrounding skin. · Keep your skin clean and free of sweat, wound drainage, urine, and feces. · Use skin lotions to keep your skin from drying out and cracking. Barrier lotions or creams have ingredients that can act as a shield to help protect the skin from moisture or irritation. · Try not to slide or slump across sheets in a chair or bed. And try not to sleep in a recliner chair. Lifestyle choices  · Eat healthy foods with plenty of protein to help heal damaged skin and to help new skin grow. · Get plenty of fluids.   · Stay at a healthy weight. Being either overweight or underweight can make pressure injuries more likely. · If you smoke, stop. Smoking dries the skin and reduces its blood supply. If you need help quitting, talk to your doctor about stop-smoking programs and medicines. These can increase your chances of quitting for good. Ask about using cushions or pads  · Overlays are special pads you put on top of a mattress. They provide a softer surface that will fit your body's shape better than a regular mattress. · Cushions or devices can be used to reduce pressure on certain areas of the body. For example, you can use a \"medical heel pillow\" to help prevent pressure injuries on heels. You can also get cushions for seating surfaces, such as wheelchair seats. Talk with your doctor about cushions and pads. Some products, such as doughnut-type devices, may actually cause pressure injuries or make them worse. When should you call for help? Call your doctor now or seek immediate medical care if:    1. You have signs of infection, such as:  ? Increased pain, swelling, warmth, or redness. ? Red streaks leading from the sore. ? Pus draining from the sore. ? A fever. Watch closely for changes in your health, and be sure to contact your doctor if:    · Your pressure injuries are not healing. · You have new pressure injuries. · You need help changing positions in bed or in a chair.      · Your caregiver needs help to move you           Electronically signed by LANE Rosales CNP on 3/4/2022 at 1:14 PM

## 2022-03-08 LAB
BUN BLDV-MCNC: 99 MG/DL
CALCIUM SERPL-MCNC: 9.5 MG/DL
CHLORIDE BLD-SCNC: 98 MMOL/L
CO2: 24 MMOL/L
CREAT SERPL-MCNC: 1.4 MG/DL
GFR CALCULATED: 37
GLUCOSE BLD-MCNC: 81 MG/DL
POTASSIUM SERPL-SCNC: 4.5 MMOL/L
SODIUM BLD-SCNC: 132 MMOL/L

## 2022-03-13 ENCOUNTER — HOSPITAL ENCOUNTER (INPATIENT)
Age: 72
LOS: 2 days | Discharge: NURSING, REHAB OR LONG-TERM CARE | DRG: 698 | End: 2022-03-15
Attending: FAMILY MEDICINE | Admitting: FAMILY MEDICINE
Payer: MEDICARE

## 2022-03-13 ENCOUNTER — APPOINTMENT (OUTPATIENT)
Dept: CT IMAGING | Age: 72
DRG: 698 | End: 2022-03-13
Payer: MEDICARE

## 2022-03-13 ENCOUNTER — APPOINTMENT (OUTPATIENT)
Dept: INTERVENTIONAL RADIOLOGY/VASCULAR | Age: 72
DRG: 698 | End: 2022-03-13
Payer: MEDICARE

## 2022-03-13 DIAGNOSIS — T83.022A NEPHROSTOMY TUBE DISPLACED (HCC): Primary | ICD-10-CM

## 2022-03-13 DIAGNOSIS — L89.103 DECUBITUS ULCER OF BACK, STAGE 3 (HCC): ICD-10-CM

## 2022-03-13 LAB
ANION GAP SERPL CALCULATED.3IONS-SCNC: 12 MEQ/L (ref 8–16)
BASOPHILS # BLD: 0.6 %
BASOPHILS ABSOLUTE: 0.1 THOU/MM3 (ref 0–0.1)
BUN BLDV-MCNC: 104 MG/DL (ref 7–22)
CALCIUM SERPL-MCNC: 9.4 MG/DL (ref 8.5–10.5)
CHLORIDE BLD-SCNC: 97 MEQ/L (ref 98–111)
CO2: 22 MEQ/L (ref 23–33)
CREAT SERPL-MCNC: 1.3 MG/DL (ref 0.4–1.2)
EOSINOPHIL # BLD: 6.8 %
EOSINOPHILS ABSOLUTE: 0.7 THOU/MM3 (ref 0–0.4)
ERYTHROCYTE [DISTWIDTH] IN BLOOD BY AUTOMATED COUNT: 17.4 % (ref 11.5–14.5)
ERYTHROCYTE [DISTWIDTH] IN BLOOD BY AUTOMATED COUNT: 59.4 FL (ref 35–45)
GFR SERPL CREATININE-BSD FRML MDRD: 41 ML/MIN/1.73M2
GLUCOSE BLD-MCNC: 97 MG/DL (ref 70–108)
HCT VFR BLD CALC: 29.6 % (ref 37–47)
HEMOGLOBIN: 9 GM/DL (ref 12–16)
IMMATURE GRANS (ABS): 0.05 THOU/MM3 (ref 0–0.07)
IMMATURE GRANULOCYTES: 0.5 %
LYMPHOCYTES # BLD: 9.7 %
LYMPHOCYTES ABSOLUTE: 1 THOU/MM3 (ref 1–4.8)
MCH RBC QN AUTO: 28.4 PG (ref 26–33)
MCHC RBC AUTO-ENTMCNC: 30.4 GM/DL (ref 32.2–35.5)
MCV RBC AUTO: 93.4 FL (ref 81–99)
MONOCYTES # BLD: 9.4 %
MONOCYTES ABSOLUTE: 0.9 THOU/MM3 (ref 0.4–1.3)
NUCLEATED RED BLOOD CELLS: 0 /100 WBC
OSMOLALITY CALCULATION: 295.2 MOSMOL/KG (ref 275–300)
PLATELET # BLD: 312 THOU/MM3 (ref 130–400)
PMV BLD AUTO: 9.4 FL (ref 9.4–12.4)
POTASSIUM REFLEX MAGNESIUM: 4.2 MEQ/L (ref 3.5–5.2)
RBC # BLD: 3.17 MILL/MM3 (ref 4.2–5.4)
SEG NEUTROPHILS: 73 %
SEGMENTED NEUTROPHILS ABSOLUTE COUNT: 7.2 THOU/MM3 (ref 1.8–7.7)
SODIUM BLD-SCNC: 131 MEQ/L (ref 135–145)
WBC # BLD: 9.8 THOU/MM3 (ref 4.8–10.8)

## 2022-03-13 PROCEDURE — 2709999900 IR FLUOROSCOPY LESS THAN 1 HOUR

## 2022-03-13 PROCEDURE — 99284 EMERGENCY DEPT VISIT MOD MDM: CPT

## 2022-03-13 PROCEDURE — 50432 PLMT NEPHROSTOMY CATHETER: CPT

## 2022-03-13 PROCEDURE — 80048 BASIC METABOLIC PNL TOTAL CA: CPT

## 2022-03-13 PROCEDURE — 6360000004 HC RX CONTRAST MEDICATION: Performed by: RADIOLOGY

## 2022-03-13 PROCEDURE — 87641 MR-STAPH DNA AMP PROBE: CPT

## 2022-03-13 PROCEDURE — 87500 VANOMYCIN DNA AMP PROBE: CPT

## 2022-03-13 PROCEDURE — 74176 CT ABD & PELVIS W/O CONTRAST: CPT

## 2022-03-13 PROCEDURE — 36415 COLL VENOUS BLD VENIPUNCTURE: CPT

## 2022-03-13 PROCEDURE — 6370000000 HC RX 637 (ALT 250 FOR IP): Performed by: STUDENT IN AN ORGANIZED HEALTH CARE EDUCATION/TRAINING PROGRAM

## 2022-03-13 PROCEDURE — 99223 1ST HOSP IP/OBS HIGH 75: CPT | Performed by: FAMILY MEDICINE

## 2022-03-13 PROCEDURE — 85025 COMPLETE CBC W/AUTO DIFF WBC: CPT

## 2022-03-13 PROCEDURE — 2060000000 HC ICU INTERMEDIATE R&B

## 2022-03-13 RX ORDER — MIDODRINE HYDROCHLORIDE 10 MG/1
10 TABLET ORAL
Status: DISCONTINUED | OUTPATIENT
Start: 2022-03-14 | End: 2022-03-13

## 2022-03-13 RX ORDER — 0.9 % SODIUM CHLORIDE 0.9 %
500 INTRAVENOUS SOLUTION INTRAVENOUS ONCE
Status: COMPLETED | OUTPATIENT
Start: 2022-03-13 | End: 2022-03-14

## 2022-03-13 RX ORDER — SENNOSIDES 8.8 MG/5ML
5 LIQUID ORAL 2 TIMES DAILY PRN
Status: DISCONTINUED | OUTPATIENT
Start: 2022-03-13 | End: 2022-03-15 | Stop reason: HOSPADM

## 2022-03-13 RX ORDER — HYDROXYZINE HYDROCHLORIDE 25 MG/1
25 TABLET, FILM COATED ORAL EVERY 8 HOURS PRN
Status: DISCONTINUED | OUTPATIENT
Start: 2022-03-13 | End: 2022-03-15 | Stop reason: HOSPADM

## 2022-03-13 RX ORDER — LANOLIN ALCOHOL/MO/W.PET/CERES
6 CREAM (GRAM) TOPICAL NIGHTLY PRN
Status: DISCONTINUED | OUTPATIENT
Start: 2022-03-13 | End: 2022-03-15 | Stop reason: HOSPADM

## 2022-03-13 RX ORDER — DEXTROSE MONOHYDRATE 50 MG/ML
100 INJECTION, SOLUTION INTRAVENOUS PRN
Status: DISCONTINUED | OUTPATIENT
Start: 2022-03-13 | End: 2022-03-15 | Stop reason: HOSPADM

## 2022-03-13 RX ORDER — NICOTINE POLACRILEX 4 MG
15 LOZENGE BUCCAL PRN
Status: DISCONTINUED | OUTPATIENT
Start: 2022-03-13 | End: 2022-03-13 | Stop reason: CLARIF

## 2022-03-13 RX ORDER — SENNOSIDES 8.8 MG/5ML
5 LIQUID ORAL NIGHTLY PRN
Status: DISCONTINUED | OUTPATIENT
Start: 2022-03-14 | End: 2022-03-13 | Stop reason: SDUPTHER

## 2022-03-13 RX ORDER — MIDODRINE HYDROCHLORIDE 5 MG/1
5 TABLET ORAL
Status: DISCONTINUED | OUTPATIENT
Start: 2022-03-14 | End: 2022-03-15 | Stop reason: HOSPADM

## 2022-03-13 RX ORDER — SODIUM CHLORIDE 9 MG/ML
INJECTION, SOLUTION INTRAVENOUS CONTINUOUS
Status: DISCONTINUED | OUTPATIENT
Start: 2022-03-13 | End: 2022-03-15 | Stop reason: HOSPADM

## 2022-03-13 RX ORDER — SODIUM HYPOCHLORITE 1.25 MG/ML
SOLUTION TOPICAL DAILY
Status: DISCONTINUED | OUTPATIENT
Start: 2022-03-14 | End: 2022-03-15 | Stop reason: HOSPADM

## 2022-03-13 RX ORDER — BUMETANIDE 1 MG/1
1 TABLET ORAL DAILY
Status: DISCONTINUED | OUTPATIENT
Start: 2022-03-14 | End: 2022-03-15 | Stop reason: HOSPADM

## 2022-03-13 RX ORDER — FOLIC ACID 1 MG/1
1 TABLET ORAL DAILY
Status: DISCONTINUED | OUTPATIENT
Start: 2022-03-14 | End: 2022-03-15 | Stop reason: HOSPADM

## 2022-03-13 RX ORDER — ASPIRIN 81 MG/1
81 TABLET, CHEWABLE ORAL DAILY
Status: DISCONTINUED | OUTPATIENT
Start: 2022-03-14 | End: 2022-03-15 | Stop reason: HOSPADM

## 2022-03-13 RX ORDER — DEXTROSE MONOHYDRATE 25 G/50ML
12.5 INJECTION, SOLUTION INTRAVENOUS PRN
Status: DISCONTINUED | OUTPATIENT
Start: 2022-03-13 | End: 2022-03-13 | Stop reason: CLARIF

## 2022-03-13 RX ORDER — ACETAMINOPHEN 650 MG/1
650 SUPPOSITORY RECTAL EVERY 6 HOURS PRN
Status: DISCONTINUED | OUTPATIENT
Start: 2022-03-13 | End: 2022-03-15 | Stop reason: HOSPADM

## 2022-03-13 RX ORDER — FERROUS SULFATE 325(65) MG
325 TABLET ORAL EVERY OTHER DAY
Status: DISCONTINUED | OUTPATIENT
Start: 2022-03-14 | End: 2022-03-15 | Stop reason: HOSPADM

## 2022-03-13 RX ORDER — POTASSIUM CHLORIDE 20 MEQ/1
40 TABLET, EXTENDED RELEASE ORAL PRN
Status: DISCONTINUED | OUTPATIENT
Start: 2022-03-13 | End: 2022-03-15 | Stop reason: HOSPADM

## 2022-03-13 RX ORDER — SODIUM CHLORIDE 9 MG/ML
25 INJECTION, SOLUTION INTRAVENOUS PRN
Status: DISCONTINUED | OUTPATIENT
Start: 2022-03-13 | End: 2022-03-15 | Stop reason: HOSPADM

## 2022-03-13 RX ORDER — SODIUM CHLORIDE 0.9 % (FLUSH) 0.9 %
10 SYRINGE (ML) INJECTION PRN
Status: DISCONTINUED | OUTPATIENT
Start: 2022-03-13 | End: 2022-03-15 | Stop reason: HOSPADM

## 2022-03-13 RX ORDER — ACETAMINOPHEN 500 MG
500 TABLET ORAL EVERY 8 HOURS PRN
Status: DISCONTINUED | OUTPATIENT
Start: 2022-03-13 | End: 2022-03-15 | Stop reason: HOSPADM

## 2022-03-13 RX ORDER — FLUOXETINE HYDROCHLORIDE 20 MG/1
40 CAPSULE ORAL DAILY
Status: DISCONTINUED | OUTPATIENT
Start: 2022-03-14 | End: 2022-03-15 | Stop reason: HOSPADM

## 2022-03-13 RX ORDER — ONDANSETRON 4 MG/1
4 TABLET, ORALLY DISINTEGRATING ORAL EVERY 8 HOURS PRN
Status: DISCONTINUED | OUTPATIENT
Start: 2022-03-13 | End: 2022-03-15 | Stop reason: HOSPADM

## 2022-03-13 RX ORDER — POTASSIUM CHLORIDE 7.45 MG/ML
10 INJECTION INTRAVENOUS PRN
Status: DISCONTINUED | OUTPATIENT
Start: 2022-03-13 | End: 2022-03-15 | Stop reason: HOSPADM

## 2022-03-13 RX ORDER — METOCLOPRAMIDE HYDROCHLORIDE 5 MG/5ML
5 SOLUTION ORAL EVERY 8 HOURS
Status: DISCONTINUED | OUTPATIENT
Start: 2022-03-13 | End: 2022-03-15 | Stop reason: HOSPADM

## 2022-03-13 RX ORDER — HEPARIN SODIUM 5000 [USP'U]/ML
5000 INJECTION, SOLUTION INTRAVENOUS; SUBCUTANEOUS EVERY 8 HOURS
Status: DISCONTINUED | OUTPATIENT
Start: 2022-03-13 | End: 2022-03-15 | Stop reason: HOSPADM

## 2022-03-13 RX ORDER — ONDANSETRON 2 MG/ML
4 INJECTION INTRAMUSCULAR; INTRAVENOUS EVERY 6 HOURS PRN
Status: DISCONTINUED | OUTPATIENT
Start: 2022-03-13 | End: 2022-03-15 | Stop reason: HOSPADM

## 2022-03-13 RX ORDER — SODIUM CHLORIDE 0.9 % (FLUSH) 0.9 %
5-40 SYRINGE (ML) INJECTION EVERY 12 HOURS SCHEDULED
Status: DISCONTINUED | OUTPATIENT
Start: 2022-03-13 | End: 2022-03-15 | Stop reason: HOSPADM

## 2022-03-13 RX ORDER — ACETAMINOPHEN 325 MG/1
650 TABLET ORAL EVERY 6 HOURS PRN
Status: DISCONTINUED | OUTPATIENT
Start: 2022-03-13 | End: 2022-03-15 | Stop reason: HOSPADM

## 2022-03-13 RX ORDER — ALLOPURINOL 100 MG/1
100 TABLET ORAL DAILY
Status: DISCONTINUED | OUTPATIENT
Start: 2022-03-14 | End: 2022-03-15 | Stop reason: HOSPADM

## 2022-03-13 RX ORDER — FAMOTIDINE 20 MG/1
20 TABLET, FILM COATED ORAL DAILY
Status: DISCONTINUED | OUTPATIENT
Start: 2022-03-14 | End: 2022-03-15 | Stop reason: HOSPADM

## 2022-03-13 RX ADMIN — METOCLOPRAMIDE HYDROCHLORIDE 5 MG: 5 SOLUTION ORAL at 23:08

## 2022-03-13 RX ADMIN — METOPROLOL TARTRATE 12.5 MG: 25 TABLET, FILM COATED ORAL at 23:09

## 2022-03-13 RX ADMIN — IOTHALAMATE MEGLUMINE 20 ML: 600 INJECTION INTRAVASCULAR at 18:48

## 2022-03-13 ASSESSMENT — PAIN SCALES - GENERAL: PAINLEVEL_OUTOF10: 0

## 2022-03-13 NOTE — ED NOTES
Patient resting in bed. Respirations easy and unlabored. No distress noted. Call light within reach. Will monitor.       Ruma Shepherd RN  03/13/22 3160

## 2022-03-13 NOTE — PROGRESS NOTES
1800 Pt arrived in IR for left nephrostomy tube replacement. Skin around old nephrostomy tube site reddened, slightly swollen. 1815 Pt positioned prone on table. 1818 Pt prepped for procedure with chloraprep. Radha Coupe Procedure started with Dr. Ryne Agrawal Procedure stopped per Dr. Tana Tam. Dr. Tana Tam discussing plan of care with Dr. Zee Castro in [facility] Pt back to cart, positioned for comfort. Warm blankets provided. 1850 Pt taken back to ER via cart. No complaints at this time.

## 2022-03-13 NOTE — ED PROVIDER NOTES
EMERGENCY DEPARTMENT ENCOUNTER     CHIEF COMPLAINT   Chief Complaint   Patient presents with    Other     nephrostomy tube out        HPI   Miguel Ángel Chau is a 73 y.o. female with known sacral decub ulcer, CKD, left staghorn calculus with nephrostomy tube who presents with dislodged tube which occurred this afternoon at her SNF, onset was today. The duration has been constant since the onset. The patient has associated fever, abdominal or back pain. The same thing occurred three weeks ago, which was fixed as outpatient. There are no alleviating factors. The context is that the symptoms started spontaneously, without any known precipitants. REVIEW OF SYSTEMS   GI: See history of present illness   Cardiac: No chest pain or syncope   Pulmonary: No difficulty breathing or new cough   General: No fevers   : +dislodged nephrostomy tube; No hematuria or dysuria   See HPI for further details. All other review of systems are reviewed and are otherwise negative.      PAST MEDICAL & SURGICAL HISTORY   Past Medical History:   Diagnosis Date    CHF (congestive heart failure) ([facility])     Dr. Audra Siddiqui Depression     Gout attack     Hypertension     Osteoarthritis     Pulmonary artery hypertension ([facility] [address]     [employer]-- Dr. Monica Mars-- Dr. Audra Siddiuqi Renal calculi     Dr. Cecilia Shea Thrombocytopenia [facility])       Past Surgical History:   Procedure Laterality Date    APPENDECTOMY  1960    BRONCHOSCOPY N/A 11/22/2021    BRONCHOSCOPY performed by Lino Edwards MD at [facility]    BRONCHOSCOPY N/A 11/30/2021    BRONCHOSCOPY WITHOUT FLURO performed by Lino Edwards MD at [facility]    COLECTOMY N/A 1/27/2022    LAPAROSCOPY WITH DIVERTING LOOP COLOSTOMY performed by Toya Griffiths MD at [facility] N/A 11/24/2021    EGD PEG TUBE PLACEMENT performed by Keri Madrigal MD at [facility]    IR [address]  11/26/2021    IR CHEST TUBE INSERTION 11/26/2021 [facility]    IR NEPHROSTOMY PERCUTANEOUS LEFT  11/1/2021    IR NEPHROSTOMY PERCUTANEOUS LEFT 11/1/2021 [facility]    PRESSURE ULCER DEBRIDEMENT Right 8/6/2021    EXCISION RIGHT BUTTOCK WOUND AND CLOSURE performed by Ganesh Sutton MD at [address] [place] [address]   Current Outpatient Rx   Medication Sig Dispense Refill    folic acid (FOLVITE) 1 MG tablet Take 1 tablet by mouth daily 30 tablet 3    ferrous sulfate (IRON 325) 325 (65 Fe) MG tablet Take 1 tablet by mouth every other day 30 tablet 1    melatonin 3 MG TABS tablet Take 2 tablets by mouth nightly as needed (anxiety, sleep) 30 tablet 1    allopurinol (ZYLOPRIM) 100 MG tablet Take 100 mg by mouth daily      famotidine (PEPCID) 20 MG tablet 20 mg by PEG Tube route daily      hydrOXYzine (ATARAX) 25 MG tablet 25 mg by PEG Tube route every 8 hours as needed for Anxiety      metoclopramide (REGLAN) 5 MG/5ML solution 5 mg by PEG Tube route every 8 hours      midodrine (PROAMATINE) 5 MG tablet 10 mg by PEG Tube route 3 times daily      senna (SENOKOT) 8.8 MG/5ML SYRP syrup Take 5 mLs by mouth nightly as needed      metoprolol tartrate (LOPRESSOR) 25 MG tablet Take 0.5 tablets by mouth 2 times daily 90 tablet 1    sodium hypochlorite (DAKINS) 0.125 % SOLN external solution Apply Dakin's moistened gauze dressings to wound twice daily and as needed.  1 Bottle 2    sodium chloride 1 g tablet Take 1 tablet by mouth 2 times daily 240 tablet 3    FLUoxetine (PROZAC) 40 MG capsule Take 1 capsule by mouth daily 90 capsule 3    potassium chloride (KLOR-CON M) 10 MEQ extended release tablet Take 1 tablet by mouth every other day 90 tablet 3    bumetanide (BUMEX) 1 MG tablet Take 1 tablet by mouth daily 60 tablet 11    Multiple Vitamins-Minerals (MULTIVITAMIN WOMEN PO) Take 1 tablet by mouth daily      acetaminophen (TYLENOL) 650 MG extended release tablet Take 650 mg by mouth every 8 hours as needed for Pain      Riociguat (ADEMPAS) 2.5 MG TABS Take 2.5 mg by mouth 3 times daily      docusate sodium (COLACE) 100 MG capsule Take 100 mg by mouth as needed       aspirin 81 MG tablet Take 81 mg by mouth daily           ALLERGIES   No Known Allergies     SOCIAL AND FAMILY HISTORY   Social History     Socioeconomic History    Marital status: Single     Spouse name: Not on file    Number of children: 0    Years of education: Not on file    Highest education level: Not on file   Occupational History    Not on file   Tobacco Use    Smoking status: Never Smoker    Smokeless tobacco: Never Used   Vaping Use    Vaping Use: Never used   Substance and Sexual Activity    Alcohol use: No    Drug use: No    Sexual activity: Not Currently   Other Topics Concern    Not on file   Social History Narrative    Not on file     Social Determinants of Health     Financial Resource Strain: Low Risk     Difficulty of Paying Living Expenses: Not very hard   Food Insecurity: No Food Insecurity    Worried About Running Out of Food in the Last Year: Never true    Traci of Food in the Last Year: Never true   Transportation Needs:     Lack of Transportation (Medical): Not on file    Lack of Transportation (Non-Medical):  Not on file   Physical Activity:     Days of Exercise per Week: Not on file    Minutes of Exercise per Session: Not on file   Stress:     Feeling of Stress : Not on file   Social Connections:     Frequency of Communication with Friends and Family: Not on file    Frequency of Social Gatherings with Friends and Family: Not on file    Attends Holiness Services: Not on file    Active Member of Clubs or Organizations: Not on file    Attends Club or Organization Meetings: Not on file    Marital Status: Not on file   Intimate Partner Violence:     Fear of Current or Ex-Partner: Not on file    Emotionally Abused: Not on file    Physically Abused: Not on file    Sexually Abused: Not on left kidney. However, this extravasated into the left perinephric    space and the wire coiled within the left perinephric space external to the kidney. The wire could not be redirected into the renal collecting system. There is now contrast in the left perinephric space which limits visualization of the staghorn calculi. Recommend urologic consultation. Patient may require a retrograde ureteral catheter to opacify the collecting system which may aid in the ability to obtain a new percutaneous nephrostomy access. **This report has been created using voice recognition software. It may contain minor errors which are inherent in voice recognition technology. **      Final report electronically signed by Dr. Marlene Bernardo on 3/13/2022 7:33 PM      CT ABDOMEN PELVIS WO CONTRAST Additional Contrast? None   Final Result   1. Small bilateral pleural effusions with adjacent atelectasis/infiltrate. .   2. Bilateral perinephric stranding, left-sided hydronephrosis and nephrolithiasis. 3. Cholelithiasis. 4. Sacral decubitus ulcer.       Final report electronically signed by Dr. Omar Neff on 3/13/2022 6:16 PM           LABS   Labs Reviewed   CBC WITH AUTO DIFFERENTIAL - Abnormal; Notable for the following components:       Result Value    RBC 3.17 (*)     Hemoglobin 9.0 (*)     Hematocrit 29.6 (*)     MCHC 30.4 (*)     RDW-CV 17.4 (*)     RDW-SD 59.4 (*)     Eosinophils Absolute 0.7 (*)     All other components within normal limits   BASIC METABOLIC PANEL W/ REFLEX TO MG FOR LOW K - Abnormal; Notable for the following components:    Sodium 131 (*)     Chloride 97 (*)     CO2 22 (*)      (*)     CREATININE 1.3 (*)     All other components within normal limits   GLOMERULAR FILTRATION RATE, ESTIMATED - Abnormal; Notable for the following components:    Est, Glom Filt Rate 41 (*)     All other components within normal limits   ANION GAP   OSMOLALITY        ED COURSE & MEDICAL DECISION MAKING   Pertinent Labs & Imaging studies reviewed and interpreted. (See chart for details)   See EMR for medications prescribed   Vitals:    03/13/22 1953   BP: (!) 146/58   Pulse: (!) 49   Resp: 18   Temp:    SpO2: 98%        Differential diagnosis: dislodged nephrostomy tube, other     FINAL IMPRESSION   1. Nephrostomy tube displaced ([address]    2. Decubitus ulcer of back, stage 3 (HCC)         PLAN   MDM - pt has a dislodged nephrostomy tube from left flank (site of staghorn calculus), will need reinsertion by IR (Dr. Maggy Rosado has been consulted). Pt last ate at noon, will be kept NPO from now until initiation of procedure. Unfortunately IR was not able to insert new tube into existing tract, also recommend urology consult, will admit pt for further management. Consulting urology currently - agreed to see in consult Vianca Mercado). Hospital medicine happy to admit to floor. Orders being written as of 2030.     Electronically signed by: Eric Doyle MD, 3/13/2022 8:35 PM   (This note was completed with a voice recognition program)         Reanna Tyler MD  03/13/22 2035

## 2022-03-13 NOTE — ED NOTES
Bedside Shift report received from [facility] [facility], [address] [place] [address]. Pt resting comfortable in bed. Pt is alert and oriented, respirations are equal and unlabored, pt denies needs at this time, will continue to monitor.  Pt returned from [facility], [address] [place] [address]  03/13/22 1910

## 2022-03-13 NOTE — ED NOTES
ED nurse-to-nurse bedside report    Chief Complaint   Patient presents with    Other     nephrostomy tube out      LOC: alert and orientated to name, place, date  Vital signs   Vitals:    03/13/22 1604 03/13/22 1800   BP: (!) 150/65 138/64   Pulse: 79 76   Resp: 16 16   Temp: 98.9 °F (37.2 °C)    TempSrc: Oral    SpO2: 93% 93%      Pain:    Pain Interventions: none  Pain Goal: 0  Oxygen: No    Current needs required none   Telemetry: No  LDAs:    Continuous Infusions:   Mobility: Fully dependent  Friedman Fall Risk Score: Fall Risk 9/11/2020 6/18/2019 10/4/2017   2 or more falls in past year? no no no   Fall with injury in past year? no no no       Report given to:  Ben Quijano RN  03/13/22 5023

## 2022-03-14 ENCOUNTER — APPOINTMENT (OUTPATIENT)
Dept: INTERVENTIONAL RADIOLOGY/VASCULAR | Age: 72
DRG: 698 | End: 2022-03-14
Payer: MEDICARE

## 2022-03-14 LAB
ANION GAP SERPL CALCULATED.3IONS-SCNC: 13 MEQ/L (ref 8–16)
BASOPHILS # BLD: 0.6 %
BASOPHILS ABSOLUTE: 0.1 THOU/MM3 (ref 0–0.1)
BUN BLDV-MCNC: 95 MG/DL (ref 7–22)
CALCIUM SERPL-MCNC: 9.2 MG/DL (ref 8.5–10.5)
CHLORIDE BLD-SCNC: 103 MEQ/L (ref 98–111)
CO2: 21 MEQ/L (ref 23–33)
CREAT SERPL-MCNC: 1.3 MG/DL (ref 0.4–1.2)
EOSINOPHIL # BLD: 3.4 %
EOSINOPHILS ABSOLUTE: 0.4 THOU/MM3 (ref 0–0.4)
ERYTHROCYTE [DISTWIDTH] IN BLOOD BY AUTOMATED COUNT: 17.7 % (ref 11.5–14.5)
ERYTHROCYTE [DISTWIDTH] IN BLOOD BY AUTOMATED COUNT: 58.8 FL (ref 35–45)
GFR SERPL CREATININE-BSD FRML MDRD: 41 ML/MIN/1.73M2
GLUCOSE BLD-MCNC: 87 MG/DL (ref 70–108)
HCT VFR BLD CALC: 26.4 % (ref 37–47)
HEMOGLOBIN: 8.1 GM/DL (ref 12–16)
IMMATURE GRANS (ABS): 0.05 THOU/MM3 (ref 0–0.07)
IMMATURE GRANULOCYTES: 0.5 %
LYMPHOCYTES # BLD: 8.5 %
LYMPHOCYTES ABSOLUTE: 0.9 THOU/MM3 (ref 1–4.8)
MCH RBC QN AUTO: 28.3 PG (ref 26–33)
MCHC RBC AUTO-ENTMCNC: 30.7 GM/DL (ref 32.2–35.5)
MCV RBC AUTO: 92.3 FL (ref 81–99)
MONOCYTES # BLD: 8.2 %
MONOCYTES ABSOLUTE: 0.9 THOU/MM3 (ref 0.4–1.3)
MRSA SCREEN RT-PCR: NEGATIVE
NUCLEATED RED BLOOD CELLS: 0 /100 WBC
PLATELET # BLD: 289 THOU/MM3 (ref 130–400)
PMV BLD AUTO: 9.4 FL (ref 9.4–12.4)
POTASSIUM REFLEX MAGNESIUM: 4.1 MEQ/L (ref 3.5–5.2)
RBC # BLD: 2.86 MILL/MM3 (ref 4.2–5.4)
SEG NEUTROPHILS: 78.8 %
SEGMENTED NEUTROPHILS ABSOLUTE COUNT: 8.5 THOU/MM3 (ref 1.8–7.7)
SODIUM BLD-SCNC: 137 MEQ/L (ref 135–145)
VANCOMYCIN RESISTANT ENTEROCOCCUS: POSITIVE
WBC # BLD: 10.8 THOU/MM3 (ref 4.8–10.8)

## 2022-03-14 PROCEDURE — 6360000004 HC RX CONTRAST MEDICATION: Performed by: RADIOLOGY

## 2022-03-14 PROCEDURE — 80048 BASIC METABOLIC PNL TOTAL CA: CPT

## 2022-03-14 PROCEDURE — 2060000000 HC ICU INTERMEDIATE R&B

## 2022-03-14 PROCEDURE — 36415 COLL VENOUS BLD VENIPUNCTURE: CPT

## 2022-03-14 PROCEDURE — 6370000000 HC RX 637 (ALT 250 FOR IP): Performed by: STUDENT IN AN ORGANIZED HEALTH CARE EDUCATION/TRAINING PROGRAM

## 2022-03-14 PROCEDURE — 99221 1ST HOSP IP/OBS SF/LOW 40: CPT | Performed by: UROLOGY

## 2022-03-14 PROCEDURE — 2709999900 IR GUIDED NEPHROSTOMY CATH PLACEMENT LEFT

## 2022-03-14 PROCEDURE — 50432 PLMT NEPHROSTOMY CATHETER: CPT

## 2022-03-14 PROCEDURE — 6360000002 HC RX W HCPCS: Performed by: RADIOLOGY

## 2022-03-14 PROCEDURE — 85025 COMPLETE CBC W/AUTO DIFF WBC: CPT

## 2022-03-14 PROCEDURE — 0T25X0Z CHANGE DRAINAGE DEVICE IN KIDNEY, EXTERNAL APPROACH: ICD-10-PCS | Performed by: RADIOLOGY

## 2022-03-14 PROCEDURE — 99233 SBSQ HOSP IP/OBS HIGH 50: CPT | Performed by: INTERNAL MEDICINE

## 2022-03-14 PROCEDURE — 2580000003 HC RX 258: Performed by: STUDENT IN AN ORGANIZED HEALTH CARE EDUCATION/TRAINING PROGRAM

## 2022-03-14 RX ORDER — MIDAZOLAM HYDROCHLORIDE 1 MG/ML
INJECTION INTRAMUSCULAR; INTRAVENOUS
Status: COMPLETED | OUTPATIENT
Start: 2022-03-14 | End: 2022-03-14

## 2022-03-14 RX ORDER — FENTANYL CITRATE 50 UG/ML
INJECTION, SOLUTION INTRAMUSCULAR; INTRAVENOUS
Status: COMPLETED | OUTPATIENT
Start: 2022-03-14 | End: 2022-03-14

## 2022-03-14 RX ADMIN — METOPROLOL TARTRATE 12.5 MG: 25 TABLET, FILM COATED ORAL at 20:36

## 2022-03-14 RX ADMIN — METOCLOPRAMIDE HYDROCHLORIDE 5 MG: 5 SOLUTION ORAL at 06:26

## 2022-03-14 RX ADMIN — IOTHALAMATE MEGLUMINE 8 ML: 600 INJECTION INTRAVASCULAR at 14:06

## 2022-03-14 RX ADMIN — MIDODRINE HYDROCHLORIDE 5 MG: 5 TABLET ORAL at 16:51

## 2022-03-14 RX ADMIN — FOLIC ACID 1 MG: 1 TABLET ORAL at 08:26

## 2022-03-14 RX ADMIN — SODIUM CHLORIDE, PRESERVATIVE FREE 10 ML: 5 INJECTION INTRAVENOUS at 20:36

## 2022-03-14 RX ADMIN — FENTANYL CITRATE 25 MCG: 50 INJECTION, SOLUTION INTRAMUSCULAR; INTRAVENOUS at 13:54

## 2022-03-14 RX ADMIN — SODIUM CHLORIDE: 9 INJECTION, SOLUTION INTRAVENOUS at 02:28

## 2022-03-14 RX ADMIN — FLUOXETINE 40 MG: 20 CAPSULE ORAL at 08:27

## 2022-03-14 RX ADMIN — METOCLOPRAMIDE HYDROCHLORIDE 5 MG: 5 SOLUTION ORAL at 16:50

## 2022-03-14 RX ADMIN — METOCLOPRAMIDE HYDROCHLORIDE 5 MG: 5 SOLUTION ORAL at 23:19

## 2022-03-14 RX ADMIN — DAKIN'S SOLUTION 0.125% (QUARTER STRENGTH): 0.12 SOLUTION at 08:27

## 2022-03-14 RX ADMIN — SODIUM CHLORIDE 500 ML: 9 INJECTION, SOLUTION INTRAVENOUS at 01:16

## 2022-03-14 RX ADMIN — MIDAZOLAM 0.5 MG: 1 INJECTION INTRAMUSCULAR; INTRAVENOUS at 13:54

## 2022-03-14 RX ADMIN — ASPIRIN 81 MG CHEWABLE TABLET 81 MG: 81 TABLET CHEWABLE at 08:27

## 2022-03-14 RX ADMIN — FAMOTIDINE 20 MG: 20 TABLET ORAL at 08:27

## 2022-03-14 RX ADMIN — ALLOPURINOL 100 MG: 100 TABLET ORAL at 08:27

## 2022-03-14 RX ADMIN — MIDODRINE HYDROCHLORIDE 5 MG: 5 TABLET ORAL at 11:08

## 2022-03-14 RX ADMIN — METOPROLOL TARTRATE 12.5 MG: 25 TABLET, FILM COATED ORAL at 08:26

## 2022-03-14 RX ADMIN — MIDODRINE HYDROCHLORIDE 5 MG: 5 TABLET ORAL at 08:27

## 2022-03-14 RX ADMIN — FERROUS SULFATE TAB 325 MG (65 MG ELEMENTAL FE) 325 MG: 325 (65 FE) TAB at 08:27

## 2022-03-14 ASSESSMENT — PAIN SCALES - GENERAL
PAINLEVEL_OUTOF10: 2
PAINLEVEL_OUTOF10: 0
PAINLEVEL_OUTOF10: 0

## 2022-03-14 NOTE — ED NOTES
ED to inpatient nurses report    Chief Complaint   Patient presents with    Other     nephrostomy tube out      Present to ED from nursing home  LOC: alert and orientated to name, place, date  Vital signs   Vitals:    03/13/22 1604 03/13/22 1800 03/13/22 1953   BP: (!) 150/65 138/64 (!) 146/58   Pulse: 79 76 (!) 49   Resp: 16 16 18   Temp: 98.9 °F (37.2 °C)     TempSrc: Oral     SpO2: 93% 93% 98%      Oxygen Baseline room air    Current needs required none   LDAs:    Mobility: Requires assistance * 1  Pending ED orders: none  Present condition: stable    Our promise was given to patient    Electronically signed by Dusty Cid RN on 3/13/2022 at 9:08 PM       Dusty Cid RN  03/13/22 2109

## 2022-03-14 NOTE — DISCHARGE INSTR - COC
Continuity of Care Form    Patient Name: Garth Holland   :  1950  MRN:  [id number]    Admit date:  3/13/2022  Discharge date:  3/15/2022    Code Status Order: Full Code   Advance Directives:      Admitting Physician:  Cherry Jameson MD  PCP: Ren Linton MD    Discharging Nurse:   [address] Unit/Room#: 4K-15/015-A  Discharging Unit Phone Number: [phone]    Emergency Contact:   Extended Emergency Contact Information  Primary Emergency Contact: Zehra Rodriguez [address] Phone: [phone]  Mobile Phone: [phone]  Relation: Brother/Sister  Secondary Emergency Contact: [address] Phone: [phone]  Mobile Phone: [phone]  Relation: Brother/Sister    Past Surgical History:  Past Surgical History:   Procedure Laterality Date    APPENDECTOMY  1960    BRONCHOSCOPY N/A 2021    BRONCHOSCOPY performed by Maggy Kenny MD at  [facility]    BRONCHOSCOPY N/A 2021    BRONCHOSCOPY WITHOUT FLURO performed by Maggy Kenny MD at  [facility]    COLECTOMY N/A 2022    LAPAROSCOPY WITH DIVERTING LOOP COLOSTOMY performed by Richard Oneal MD at [address] N/A 2021    EGD PEG TUBE PLACEMENT performed by Miky Wayne MD at  [facility]    IR [address]  2021    IR CHEST TUBE INSERTION 2021 [facility]    IR NEPHROSTOMY PERCUTANEOUS LEFT  2021    IR NEPHROSTOMY PERCUTANEOUS LEFT 2021 Erich Gandhi MD [facility]    PRESSURE ULCER DEBRIDEMENT Right 2021    EXCISION RIGHT BUTTOCK WOUND AND CLOSURE performed by Nicole Sawyer MD at [facility]       Immunization History:   Immunization History   Administered Date(s) Administered    COVID-19, Pfizer Purple top, DILUTE for use, 12+ yrs, 30mcg/0.3mL dose 2021, 2021    Influenza, MDCK Quadv, IM, PF (Flucelvax 2 yrs and older) 10/15/2020    Influenza, Quadv, IM, (6 mo and older Fluzone, Flulaval, Fluarix and 3 yrs and older Afluria) 10/11/2017    Influenza, Quadv, IM, PF (6 mo and older Fluzone, Flulaval, Fluarix, and 3 yrs and older Afluria) 09/17/2019    Pneumococcal Conjugate 13-valent ([place] [facility]) 10/11/2017    Pneumococcal Polysaccharide ([id number]) 06/18/2019       Active Problems:  Patient Active Problem List   Diagnosis Code    HTN (hypertension) I10    Chronic depression F32. A    CHF (congestive heart failure) ([facility]) I50.9    Thrombocytopenia ([facility]) D69.6    Moderate episode of recurrent major depressive disorder ([facility]) F33.1    Renal failure syndrome N19    Hyperkalemia E87.5    Isolated non-nephrotic proteinuria R80.0    ALVIN (acute kidney injury) ([facility] [address] N17.9    Chronic right-sided heart failure ([facility]) I50.812    Pulmonary HTN ([facility]) I27.20    Hypotension due to hypovolemia I95.89, E86.1    Acute renal failure superimposed on stage 4 chronic kidney disease (HCC) N17.9, N18.4    Pressure injury of sacral region, stage 4 ([facility]) L89.154    Acute respiratory failure ([facility]) J96.00    Flash pulmonary edema ([facility]) J81.0    Shock ([facility]) R57.9    Aspiration pneumonia of left lung ([facility]) J69.0    Pleural effusion J90    Paroxysmal atrial fibrillation ([facility]) I48.0    Hemoptysis R04.2    Chronic respiratory failure with hypoxia ([facility]) J96.11    Pneumothorax, right J93.9    Collapse of left lung J98.11    Abnormal chest x-ray R93.89    Acute on chronic respiratory failure with hypoxia ([facility]) J96.21    Retroperitoneal hematoma K66.1    HAP (hospital-acquired pneumonia) J18.9, Y95    Colostomy in place ([facility]) Z93.3    PEG (percutaneous endoscopic gastrostomy) status ([facility] [address] Z93.1    Intertriginous dermatitis associated with moisture L30.4    Peristomal dermatitis associated with moisture L30.8    Nephrostomy tube displaced ([facility] [address] T83.022A       Isolation/Infection:   Isolation            Contact          Patient Infection Status       Infection Onset Added Last Indicated Last Indicated By Review Planned Expiration Resolved Resolved By    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 by PCR        PCR 3/2022    ESBL (Extended Spectrum Beta Lactamase) 01/19/22 01/22/22 01/19/22 Culture, Reflexed, Urine        Proteus urine 1/2022    Resolved    COVID-19 (Rule Out) 10/27/21 10/27/21 10/27/21 COVID-19, Rapid (Ordered)   10/27/21 Rule-Out Test Resulted            Nurse Assessment:  Last Vital Signs: /67   Pulse 94   Temp 99.1 °F (37.3 °C) (Oral)   Resp 18   Ht 4' 9\" (1.448 m)   Wt 133 lb (60.3 kg)   SpO2 94%   BMI 28.78 kg/m²     Last documented pain score (0-10 scale): Pain Level: 0  Last Weight:   Wt Readings from Last 1 Encounters:   03/13/22 133 lb (60.3 kg)     Mental Status:  disoriented    IV Access:  - None    Nursing Mobility/ADLs:  Walking   Dependent  Transfer  Dependent  Bathing  Dependent  Dressing  Dependent  Toileting  Dependent  Feeding  Dependent  Med Admin  Dependent  Med Delivery   crushed and down PEG    Wound Care Documentation and Therapy:  Wound 08/24/21 Sacrum DTI Coccyx (has been been debrided 8/6/21)  (Active)   Dressing Status Clean;Dry; Intact 03/14/22 0830   Wound Cleansed Cleansed with saline 03/14/22 0830   Dressing/Treatment Moist to dry;Gauze dressing/dressing sponge;ABD; Foam 03/14/22 0830   Number of days: 201       Wound 03/13/22 Toe (Comment  which one) Anterior; Left (Active)   Wound Etiology Other 03/14/22 0830   Dressing Status New dressing applied;Clean;Dry; Intact 03/14/22 0830   Wound Cleansed Cleansed with saline 03/14/22 0830   Dressing/Treatment Foam 03/14/22 0830   Wound Assessment Pink/red;Bleeding 03/14/22 0830   Katey-wound Assessment Intact 03/14/22 0830   Number of days: 0       Wound 03/13/22 Toe (Comment  which one) Anterior;Right (Active)   Wound Etiology Other 03/14/22 0830   Dressing Status New dressing applied;Clean;Dry; Intact 03/14/22 0830   Wound Cleansed Cleansed with saline 03/14/22 0830 Dressing/Treatment Foam 03/14/22 0830   Wound Assessment Bleeding; Other (Comment);Pink/red 03/14/22 0830   Katey-wound Assessment Intact 03/14/22 0830   Number of days: 0        Elimination:  Continence: Bowel: No  Bladder: seaman, left nephrostomy tube  Urinary Catheter: Insertion Date: 3/13/2022    Colostomy/Ileostomy/Ileal Conduit: Yes  Colostomy LUQ Loop-Stomal Appliance: 1 piece  Colostomy LUQ Loop-Stoma  Assessment: Flush,Pale  Colostomy LUQ Loop-Mucocutaneous Junction: Intact  Colostomy LUQ Loop-Peristomal Assessment: Red  Colostomy LUQ Loop-Treatment: Other (Comment) (bag burped)  Colostomy LUQ Loop-Stool Appearance: Loose  Colostomy LUQ Loop-Stool Color: Brown,Green    Date of Last BM: 3/15/2022    Intake/Output Summary (Last 24 hours) at 3/14/2022 0840  Last data filed at 3/14/2022 0630  Gross per 24 hour   Intake 120 ml   Output 800 ml   Net -680 ml     I/O last 3 completed shifts: In: 120 [NG/GT:120]  Out: 800 [Urine:800]    Safety Concerns:     None    Impairments/Disabilities:      None    Nutrition Therapy:  Current Nutrition Therapy:   - Oral Diet:  Dysphagia 1 pureed  - Tube Feedings:  Renal, Nepro at 40 ml/hr with free water flushes of 300 ml every 8 hours    Routes of Feeding: Oral and Gastrostomy Tube  Liquids: Thin Liquids  Daily Fluid Restriction: no  Last Modified Barium Swallow with Video (Video Swallowing Test): not done    Treatments at the Time of Hospital Discharge:   Respiratory Treatments: n/a  Oxygen Therapy:  is not on home oxygen therapy.   Ventilator:    - No ventilator support    Rehab Therapies: Physical, Occupational, and Speech therapy   Weight Bearing Status/Restrictions: No weight bearing restrictions  Other Medical Equipment (for information only, NOT a DME order):  wheelchair  Other Treatments: n/a    Patient's personal belongings (please select all that are sent with patient):  Harish    RN SIGNATURE:  Electronically signed by Tamika Alvarez RN on 3/15/22 at 1:06 PM EDT    CASE MANAGEMENT/SOCIAL WORK SECTION    Inpatient Status Date: 3/13/2022    Readmission Risk Assessment Score:  Readmission Risk              Risk of Unplanned Readmission:  33           Discharging to Facility/ Agency   Name: [facility]  Address: [place] [address]  Phone: [phone]  Fax: [phone]    Dialysis Facility (if applicable)   Name:  Address:  Dialysis Schedule:  Phone:  Fax:    / signature: Electronically signed by OSIEL Foster on 3/14/22 at 8:41 AM EDT    PHYSICIAN SECTION    Prognosis: Good    Condition at Discharge: Stable    Rehab Potential (if transferring to Rehab): Good    Recommended Labs or Other Treatments After Discharge:     Physician Certification: I certify the above information and transfer of Garth Holland  is necessary for the continuing treatment of the diagnosis listed and that she requires [place] for greater 30 days.      Update Admission H&P: No change in H&P    PHYSICIAN SIGNATURE:  Electronically signed by Jamee Bonner MD on 3/15/22 at 8:24 AM EDT

## 2022-03-14 NOTE — H&P
HOSPITALIST H&P NOTE    Patient - Tye Aragon   MRN -  [id number]   [place] # - [de-identified]   - 1950      Date of Admission -  3/13/2022  3:58 PM  Date of evaluation -  3/13/2022  Room - 20/020A   Hospital Day - 0  Primary Care Physician - Huey Chatman MD   Code Status: Full Code    Chief Complaint:    Dislodged nephrostomy tube    History Of Presenting Illness:     Tye Aragon is a 73 y.o. female with a medical history of HFpEF 60%, sacral decubitus wound s/p diverting colostomy, pulmonary HTN, ANCA vasculitis, chronic normocytic anemia, gout, anxiety, CKD3b with obstructive uropathy d/t staghorn calculus who presents to [facility] ED on 22 for a dislodged Nephrostomy tube. Already fell out once last week but was able to place back in at outpatient clinic. This time, they were unable to, so she was told to come to the ED. At this time she denies any symptoms including chest pain, shortness of breath, abdominal pain, nausea, vomiting, diarrhea. She does have a seaman in place chronically. Patient did have a fairly prolonged recent hospital course which included ICU for pneumonia and is now with baseline weakness, muscle wasting. Has been mostly bedbound. Has been working with PT/OT at [facility] daily. Family report marked improvements, but still ways to go. ED course: Upon arrival, patient was hemodynamically stable with vitals WNL. CT abdomen pelvis revealed small bilateral pleural effusions, bilateral perinephric stranding, left-sided hydronephrosis and nephrolithiasis, and sacral decubitus ulcer. Labs revealed Na 131, K 4.2, Cl 97, Bicarb 22, , creatinine 1.3, anion gap 12, GFR 41, calcium 9.4. Hgb 9.0. IR attempted to place back on  as well but could not. Urology consulted, with decision to admit and keep NPO. Patient was subsequently admitted to the floor for further management.      Past Medical History    Past Medical History      Diagnosis Date    CHF (congestive heart failure) [facility])     Dr. Jono Meeks Depression     Gout attack     Hypertension     Osteoarthritis     Pulmonary artery hypertension ([facility] [address]     [facility]-- Dr. Joe Wynn-- Dr. Jono Meeks Renal calculi     Dr. Caitlyn Ta Thrombocytopenia [facility])       Past Surgical History        Procedure Laterality Date    APPENDECTOMY  1960    BRONCHOSCOPY N/A 11/22/2021    BRONCHOSCOPY performed by Jojo Mckeon MD at [address] 11/30/2021    BRONCHOSCOPY WITHOUT FLURO performed by Jojo Mckeon MD at [address] N/A 1/27/2022    LAPAROSCOPY WITH DIVERTING LOOP COLOSTOMY performed by Benjy Tovar MD at [facility] N/A 11/24/2021    EGD PEG TUBE PLACEMENT performed by Joyce Hooper MD at [facility]    IR [address]  11/26/2021    IR CHEST TUBE INSERTION 11/26/2021 [facility]    IR NEPHROSTOMY PERCUTANEOUS LEFT  11/1/2021    IR NEPHROSTOMY PERCUTANEOUS LEFT 11/1/2021 Gena Ngo MD [facility]    PRESSURE ULCER DEBRIDEMENT Right 8/6/2021    EXCISION RIGHT BUTTOCK WOUND AND CLOSURE performed by Gilberto Jackson MD at [address]     Medications    Current Medications     IV Drips/Infusions    Home Medications  Not in a hospital admission.   Review of Systems:   Constitutional:  No Weight Change, No Fever, No Chills, No Night Sweats, No Fatigue, No Malaise   ENT/Mouth:  No Ear Pain, No Nasal Congestion, No Sinus Pain, No Hoarseness, No sore throat, No Rhinorrhea, No Swallowing Difficulty   Eyes:  No Eye Pain, No Swelling, No Redness, No Foreign Body, No Discharge, No Vision Changes   Cardiovascular:  No Chest Pain, No Palpitations   Respiratory:  No Cough, No Sputum, No Wheezing, No SOB, No Dyspnea on Exertion, No Orthopnea,  Gastrointestinal:  No Nausea, No Vomiting, No Diarrhea, No Constipation, No Pain, No Heartburn, No Anorexia, No Dysphagia, No Hematochezia, No Flatulence  Genitourinary:  No Dysmenorrhea, No Dyspareunia, No Dysuria, No Urinary Frequency, No Hematuria, No Urinary Incontinence, No Urgency, No Flank Pain, No Urinary Flow Changes  Musculoskeletal:  No Arthralgias, No Myalgias, No Joint Swelling, No Joint Stiffness, No Back Pain, No Neck Pain  Skin:  No Skin Lesions, No Pruritis, No Hair Changes, No Breast/Skin Changes, No Nipple Discharge   Neuro:  No Weakness, No Numbness, No Paresthesias, No Loss of Consciousness, No Syncope, No Dizziness, No Headache, No Coordination Changes, No Recent Falls   Heme/Lymph:  No Bruising, No Bleeding, No Lymphadenopathy   Endocrine:  No Polyuria, No Polydipsia, No Temperature Intolerance    Vitals     oral temperature is 98.9 °F (37.2 °C). Her blood pressure is 146/58 (abnormal) and her pulse is 49 (abnormal). Her respiration is 18 and oxygen saturation is 98%. There is no height or weight on file to calculate BMI. SUPPLEMENTAL O2:     Input/Output   No intake or output data in the 24 hours ending 03/13/22 2043  No intake/output data recorded. No data found. Physical Exam   GENERAL APPEARANCE: Chronically ill, alert & cooperative, and appears to be in NAD  EYES: PERRL, EOMI, no conjunctivitis, no erythema, no discharge. HENT: normocephalic head, hearing grossly intact, no nasal discharge, oral cavity & pharynx free of inflammation, swelling, exudate, or lesions. Neck supple, non-tender without lymphadenopathy, masses or thyromegaly. CARDIAC: RRR, normal S1 and S2. No S3, S4, no m/r/g, no peripheral edema, cyanosis or pallor. Extremities warm & well perfused. Capillary refill < 2 seconds. No carotid bruits. LUNGS: CTA b/l, no rales, rhonchi, wheezing or diminished breath sounds, non-pursing lips, no cyanosis  ABDOMEN: NABS, soft, nondistended, nontender, without  guarding or rebound, no masses noted, PEG noted    MUSKULOSKELETAL: 4/5 weakness in all extrmeities, ROM symmetric, no joint erythema or tenderness. Muscle mass diminished   EXTREMITIES: No significant deformity or joint abnormality, no edema, peripheral pulses intact 2/4, no varicosities. NEUROLOGICAL: CN II-XII intact, strength and sensation symmetric and intact throughout. Cerebellar function intact  SKIN: Skin normal color, texture dry, poor turgor, stage III sacral wound present with packing   PSYCHIATRIC: AOx3, able to demonstrate good judgement & reason    Labs   ABG  Lab Results   Component Value Date    PH 7.44 01/29/2022    PO2 90 01/29/2022    PCO2 52 01/29/2022    HCO3 35 01/29/2022    O2SAT 97 01/29/2022     Lab Results   Component Value Date    IFIO2 30 01/29/2022    MODE CPAP/PS 01/29/2022    SETPEEP 5.0 01/20/2022     CBC  Recent Labs     03/13/22  1713   WBC 9.8   RBC 3.17*   HGB 9.0*   HCT 29.6*   MCV 93.4   MCH 28.4   MCHC 30.4*      MPV 9.4      BMP  Recent Labs     03/13/22  1713   *   K 4.2   CL 97*   CO2 22*   *   CREATININE 1.3*   GLUCOSE 97   CALCIUM 9.4     LFT  No results for input(s): AST, ALT, ALB, BILITOT, ALKPHOS, LIPASE in the last 72 hours. Invalid input(s): AMYLASE  TROP  Lab Results   Component Value Date    TROPONINT 0.155 01/19/2022    TROPONINT 0.176 01/19/2022    TROPONINT 0.084 10/28/2021     BNP  No results for input(s): BNP in the last 72 hours. Lactic Acid  No results for input(s): LACTA in the last 72 hours. INR  No results for input(s): INR, PROTIME in the last 72 hours. PTT  No results for input(s): APTT in the last 72 hours. Glucose  No results for input(s): POCGLU in the last 72 hours. UA No results for input(s): SPECGRAV, PHUR, COLORU, CLARITYU, MUCUS, PROTEINU, BLOODU, RBCUA, WBCUA, BACTERIA, NITRU, GLUCOSEU, BILIRUBINUR, UROBILINOGEN, KETUA, LABCAST, LABCASTTY, AMORPHOS in the last 72 hours. Invalid input(s): CRYSTALS. Imaging      IR FLUOROSCOPY LESS THAN 1 HOUR   Final Result   1.  Unsuccessful attempt to regain nephrostomy access into the left kidney due to the lack of a residual patent tract. There is a small tract which was visualized extending towards the left kidney. However, this extravasated into the left perinephric    space and the wire coiled within the left perinephric space external to the kidney. The wire could not be redirected into the renal collecting system. There is now contrast in the left perinephric space which limits visualization of the staghorn calculi. Recommend urologic consultation. Patient may require a retrograde ureteral catheter to opacify the collecting system which may aid in the ability to obtain a new percutaneous nephrostomy access. **This report has been created using voice recognition software. It may contain minor errors which are inherent in voice recognition technology. **      Final report electronically signed by Dr. Joaquin Alvarez on 3/13/2022 7:33 PM      CT ABDOMEN PELVIS WO CONTRAST Additional Contrast? None   Final Result   1. Small bilateral pleural effusions with adjacent atelectasis/infiltrate. .   2. Bilateral perinephric stranding, left-sided hydronephrosis and nephrolithiasis. 3. Cholelithiasis. 4. Sacral decubitus ulcer. Final report electronically signed by Dr. Robert Nunn on 3/13/2022 6:16 PM         Problem List      Active Hospital Problems    Diagnosis Date Noted    Nephrostomy tube displaced [facility]) Jaelyn Valencia 03/13/2022      Assessment & Plan:   1. Hx Left Obstructive Uropathy D/t Staghorn Calculus with Dislodged Nephrostomy: S/p nephrostomy tube d/t staghorn calculus, with hx recurrent ESBL UTI. Family frustrated that tube keeps falling out. Milagros Mule out once last week but was able to place back in at outpatient clinic. However unable to this time. IR attempted to place back on 03/13 as well but could not. - Urology consulted, with decision to admit    - Keep chronic Padilla in place    - NPO at midnight   2.  Stage III Sacral Decubitus Wound: Markedly improved from stage IV large wound s/p wound vac placement at her NH that was just removed the day prior to admission. Packed now. Reports that the plan was to replace the wound vac? Diverting colostomy in [facility]. - Wound care consultation placed    - Turn patient q2h    - Daily dressing changes, wound care    - Continue hydration, controlling sugars, offloading, oxygenation    - Daily PT/OT to improve strength  3. Dehydration: Clinically volume down with low electrolytes. Needs sufficient hydration to help recovery of sacral wound. - Will give one time 500cc bolus followed by 75 cc/hr continuous NS     - Hold home bumex daily   - Repeat BMP in AM   4. Pulmonary HTN: Continue home riociguat     5. ANCA Vasculitis: Per previous work-up. Recommend kidney biopsy in near future   6. CKD3b: At baseline. GFR ~40, Cr 1.3. In setting of ANCA vasculitis & chronic obstructive uropathy 2/2 large staghorn calculus with nephrostomy tube in place. Previously required dialysis. However not needing at this time with nephrostomy tube. - Continue to monitor with daily BMP   - Urology to replace nephrostomy tube   7. Chronic Diastolic Heart Failure EF 60%: Compensated and well-controlled on home bumex daily, Lopressor 12.5 mg BID. Appear to be clinically dry on admission. Electrolytes low.    - Hold Bumex at this time, continue home BB  8. Chronic Normocytic Anemia: Likely ACD 2/2 conditions listed above. Continue home iron   9. Gout: No recent acute flareups. Continue home allopurinol   10. Physical Deconditioning: S/p recent very prolonged hospital course including ICU for pneumonia with some muscle wasting. Has been mostly bedbound. Has been working with PT/OT at [facility] daily. Family report marked improvements, but still ways to go.    - PT/OT to work with her daily    - Encourage adequate nutrition and exercises in bed to preserve muscle and function    11. Hx Anxiety: On prozac, hydroxyzine, and lopressor daily. She is taking midodrine TID at home, not sure why.  Will taper down dosage to hopefully discontinue by discharge. 12. Hx Colectomy: Lap with diverting loop colostomy by Dr. Olena Bates on 01/27/22 to allow recoverability of her sacral wound.      PT/OT Eval Status: Pending     VTE prophylaxis: [] Lovenox                                 [x] SCDs                                 [x] SQ Heparin                                 [x] Encourage ambulation           [] Already on Anticoagulation     IV Fluids: 75 cc/hr  Diet: NPO     Code Status: Full Code  Disposition: TBD     Tele:   [x] yes             [] no    Electronically signed by   Edwin Martinez DO on 3/13/2022 at 8:43 PM

## 2022-03-14 NOTE — PROGRESS NOTES
Pt admitted to  4K15 via cart/stretcher. Complaints: None. Vital signs obtained. Assessment and data collection initiated. Two nurse skin assessment performed by Cynthia SANTACRUZ and Samina Machado. Oriented to room. Policies and procedures for 4K explained. All questions answered with no further questions at this time. Fall prevention and safety brochure discussed with patient. Bed alarm on. Call light in reach. Would you like your Primary Care Physician notified?  no   The best day to schedule a follow up Dr appointment is:  Monday a.m.

## 2022-03-14 NOTE — PROGRESS NOTES
Hospitalist Progress Note      Patient:  Clarisse Varner    Unit/Bed:4K-15/015-A  YOB: 1950  MRN: [id number]   Acct: [de-identified]   PCP: Lesly Allen MD  Date of Admission: 3/13/2022    Assessment/Plan:  Dislodged Nephrostomy tube due to Left Obstructive Staghorn Calculus: In the setting of recurrent ESBL UTI and associated hydronephrosis. Has had recurrent episodes of tube falling out. Unclear if any specific reason.   -Per Urology, will be getting nephrostomy tube replaced today  -Has large staghorn calculus in left kidney, follow up scheduled 3/29 with Dr Bruno Multani to discuss treatment    Stage III Sacral Decubitus Wound: Per documentation, s/p wound vac and was reportedly removed just the day prior to admission. Turn q2h. Daily dressing changes. Wound care team consulted. Medical Deconditioning/Dehydration: Recent prolonged critical illness and hospital course. Has been bedbound. Reportedly has daily PTOT at [facility] with marked improvement. Clinically dry appearing.   -On mIVF, monitor colostomy output. PTOT, dietician consult. CKD 3b: baseline eGFR 40s, Cr 1.3, at baseline at this time. Unclear etiology, in the setting of likely ANCA vasculitis and chronic obstructive uropathy 2/2 staghorn calculi with hydronephrosis. ANCA Abs elevated anti MPO elevated 416 and serine proteinase 3 IgG WNL (per chart review, 10/27/21). Hx CRRT/HD during hospitalization 11/2021.   -Trend BMP as needed. Bumex held due to clinical dehydration  -Will need renal biopsy for confirmation of ANCA vasculitis. Follow up outpatient with nephrology.   -Hold home midodrine to assess continued need for it. BP ranging 130-140s. Chronic Diastolic HF: Compensated. TTE 10/28/2021 with EF 60%, trace TR. On Bumex 1mg daily and Lopressor 12.5 mg BID. Bumex held due to clinical hypovolemia. Monitor volume status, resume diuretic when appropriate.     Pulmonary HTN: On Riociguat    Chronic macrocytic anemia: likely due to MERCEDES/chronic disease. On ferrous sulfate every other day. S/p colectomy with diverting loop colostomy: Per Dr. Marco A Stacy 01/27/2022 to allow sacral wound healing. Monitor output. Hx Anxiety: On prozac and hydroxyzine daily. Tele:   [x] yes             [] no    Code Status: Full Code    Fluids: IV NS at 76 cc/hr  Diet:  Diet NPO    DVT prophylaxis: [] Lovenox                                 [] SCDs                                 [x] SQ Heparin                                 [] Encourage ambulation                                 [] Already on Anticoagulation    Disposition:      [] TBD                             [] Home                             [] TCU                              [] Rehab                              [] Psych                             [x] SNF                             [] [place]                              [] Other-    Chief Complaint: Dislodged nephrostomy tube    Hospital Course:   72 yo F with history of CHF, sacral decubitus wound s/p diverting colostomy, pulmonary HTN, ANCA vasculitis, CKD3b with associated obstructive uropathy secondary staghorn calculus, and chronic anemia, presented from nursing home on 3/13/2022 due to noted dislodged nephrostomy tube. Has had history of dislodgment in the past. No other associated symptoms on admission. Reportedly was placed back in place outpatient in clinic last week. ED course: Upon arrival, patient was hemodynamically stable with vitals WNL. CT abdomen pelvis revealed small bilateral pleural effusions, bilateral perinephric stranding, left-sided hydronephrosis and nephrolithiasis, and sacral decubitus ulcer. Labs revealed Na 131, K 4.2, Cl 97, Bicarb 22, , creatinine 1.3, anion gap 12, GFR 41, calcium 9.4. Hgb 9.0. IR attempted to place it back on 3/13 but failed. Interval:   No acute events since admission.  Awaiting Urology evaluation. Subjective:   Reports having no symptoms. Has some left sided back pain at the nephrostomy tube site but only on palpation. No new complaints. ROS (12 point review of systems completed. Pertinent positives noted. Otherwise ROS is negative)     Medications:  Reviewed    Infusion Medications    sodium chloride      sodium chloride 75 mL/hr at 03/14/22 0228    dextrose       Scheduled Medications    allopurinol  100 mg Oral Daily    aspirin  81 mg Oral Daily    [Held by provider] bumetanide  1 mg Oral Daily    famotidine  20 mg PEG Tube Daily    ferrous sulfate  325 mg Oral Every Other Day    FLUoxetine  40 mg Oral Daily    folic acid  1 mg Oral Daily    metoclopramide  5 mg PEG Tube Q8H    Riociguat  2.5 mg Oral TID    sodium hypochlorite   Irrigation Daily    sodium chloride flush  5-40 mL IntraVENous 2 times per day    [Held by provider] heparin (porcine)  5,000 Units SubCUTAneous Q8H    midodrine  5 mg Oral TID WC    metoprolol tartrate  12.5 mg Oral BID     PRN Meds: acetaminophen, hydrOXYzine, melatonin, sodium chloride flush, sodium chloride, ondansetron **OR** ondansetron, acetaminophen **OR** acetaminophen, potassium chloride **OR** potassium alternative oral replacement **OR** potassium chloride, senna, glucagon (rDNA), dextrose, glucose, dextrose bolus (hypoglycemia) **OR** dextrose bolus (hypoglycemia)      Intake/Output Summary (Last 24 hours) at 3/14/2022 0824  Last data filed at 3/14/2022 0630  Gross per 24 hour   Intake 120 ml   Output 800 ml   Net -680 ml       Exam:  /67   Pulse 94   Temp 99.1 °F (37.3 °C) (Oral)   Resp 18   Ht 4' 9\" (1.448 m)   Wt 133 lb (60.3 kg)   SpO2 94%   BMI 28.78 kg/m²   General appearance: No apparent distress, appears stated age and cooperative. Chronically ill appearing  HEENT: Conjunctivae/corneas clear. Neck: Supple, with full range of motion. No jugular venous distention. Healed scar from prior tracheostomy tube. Respiratory:  Normal respiratory effort. Clear to auscultation, bilaterally without Rales/Wheezes/Rhonchi. Cardiovascular: Regular rate and rhythm with normal S1/S2 without murmurs, rubs or gallops. Abdomen: Soft, non-tender, non-distended with normal bowel sounds. Colostomy bag in place with brown stool in bag. Musculoskeletal: passive and active ROM x 4 extremities. Skin: Skin color, texture, turgor normal. No rashes. Stage III sacral wound  Neurologic: Neurovascularly intact without any focal sensory/motor deficits. Cranial nerves: II-XII intact, grossly non-focal. Slow but oriented  Psychiatric: Alert and oriented, thought content appropriate, normal insight  Capillary Refill: Brisk,< 3 seconds   Peripheral Pulses: +2 palpable, equal bilaterally     Labs:   Recent Labs     03/13/22 1713 03/14/22  0444   WBC 9.8 10.8   HGB 9.0* 8.1*   HCT 29.6* 26.4*    289     Recent Labs     03/13/22 1713 03/14/22  0444   * 137   K 4.2 4.1   CL 97* 103   CO2 22* 21*   * 95*   CREATININE 1.3* 1.3*   CALCIUM 9.4 9.2     No results for input(s): AST, ALT, BILIDIR, BILITOT, ALKPHOS in the last 72 hours. No results for input(s): INR in the last 72 hours. No results for input(s): Tash Charley in the last 72 hours. Microbiology:    Blood culture #1:   Lab Results   Component Value Date    BC No growth-preliminary No growth  01/21/2022       Blood culture #2:No results found for: Tashia Si    Organism:  Lab Results   Component Value Date    ORG Cutibacterium acnes 02/22/2022    ORG Staphylococcus haemolyticus 02/22/2022         Lab Results   Component Value Date    LABGRAM  02/22/2022     Many segmented neutrophils observed. No epithelial cells observed. No bacteria seen.         MRSA culture only:No results found for: [facility]    Urine culture:   Lab Results   Component Value Date    LABURIN No growth-preliminary  01/20/2022    LABURIN [place] count: 1,000 CFU/mL  01/20/2022       Respiratory culture: No results found for: CULTRESP    Aerobic and Anaerobic :  Lab Results   Component Value Date    LABAERO  02/22/2022     No growth-preliminary Current antibiotic therapy ineffective in vitro for at least one of culture isolates. LABAERO light growth  02/22/2022    LABAERO  02/22/2022     very light growth Isolates of Methicillin Resistant Staphylococcus coagulase negative (MRSE) do NOT require CONTACT isolation. Methicillin(Oxacillin)resistant strains of staphylococci (MRSA)or(MRSE)should be considered resistant to all classes of cephalosporins, penems and beta-lactams. Lab Results   Component Value Date    LABANAE  02/22/2022     No anaerobes isolated- preliminary No anaerobes isolated        Urinalysis:      Lab Results   Component Value Date    NITRU NEGATIVE 01/20/2022    WBCUA 50-75 01/20/2022    BACTERIA NONE SEEN 01/20/2022    RBCUA  01/20/2022    BLOODU LARGE 01/20/2022    SPECGRAV 1.014 01/20/2022    GLUCOSEU NEGATIVE 01/19/2022       Radiology:  IR FLUOROSCOPY LESS THAN 1 HOUR   Final Result   1. Unsuccessful attempt to regain nephrostomy access into the left kidney due to the lack of a residual patent tract. There is a small tract which was visualized extending towards the left kidney. However, this extravasated into the left perinephric    space and the wire coiled within the left perinephric space external to the kidney. The wire could not be redirected into the renal collecting system. There is now contrast in the left perinephric space which limits visualization of the staghorn calculi. Recommend urologic consultation. Patient may require a retrograde ureteral catheter to opacify the collecting system which may aid in the ability to obtain a new percutaneous nephrostomy access. **This report has been created using voice recognition software. It may contain minor errors which are inherent in voice recognition technology. **      Final report electronically signed by Dr. Edgar aDy on 3/13/2022 7:33 PM      CT ABDOMEN PELVIS WO CONTRAST Additional Contrast? None   Final Result   1. Small bilateral pleural effusions with adjacent atelectasis/infiltrate. .   2. Bilateral perinephric stranding, left-sided hydronephrosis and nephrolithiasis. 3. Cholelithiasis. 4. Sacral decubitus ulcer. Final report electronically signed by Dr. Natasha Coronado on 3/13/2022 6:16 PM        CT ABDOMEN PELVIS WO CONTRAST Additional Contrast? None    Result Date: 3/13/2022  PROCEDURE: CT ABDOMEN PELVIS WO CONTRAST CLINICAL INFORMATION: Dislodged left nephrostomy tube TECHNIQUE: CT of the abdomen and pelvis was performed without use of oral or intravenous contrast. Axial images as well as coronal and sagittal reconstructions were obtained. All CT scans at this facility use dose modulation, iterative reconstruction, and/or weight-based dosing when appropriate to reduce radiation dose to as low as reasonably achievable. COMPARISON: CT abdomen and pelvis 1/20/2022 FINDINGS: Lower thorax: There are small bilateral pleural effusions with adjacent atelectasis/infiltrate. Abdomen: Evaluation is limited due to absence of contrast. There is no free intraperitoneal air. A new left mid abdominal ostomy contains a loop of adjacent bowel. There is no bowel obstruction. There is a gastrostomy tube in the stomach. Calcified lesions are present in the lumen of the gallbladder. A left-sided nephrostomy tube is no longer visualized. Bilateral perinephric stranding is more extensive on the left side. There is left-sided hydronephrosis. Calcifications in the left kidney are stable. Hypoattenuating lesions in the kidneys may be cysts but are incompletely characterized on the current noncontrast examination. There are calcified granulomas in the spleen and liver. Atherosclerotic calcifications are present in the abdominal aorta without evidence of aneurysm. There is no mesenteric or retroperitoneal lymphadenopathy. Degenerative changes in the thoracolumbar spine are stable. Pelvis: There is a Padilla catheter in a nondistended urinary bladder, likely accounting for gas within the bladder. Phleboliths are present in the pelvis. There are calcifications in the uterus. There is no pelvic or inguinal lymphadenopathy. There is persistent subcutaneous tissue irregularity posterior to the sacrum. Degenerative changes are present in the pelvis without evidence of aggressive osseous lesions. 1. Small bilateral pleural effusions with adjacent atelectasis/infiltrate. . 2. Bilateral perinephric stranding, left-sided hydronephrosis and nephrolithiasis. 3. Cholelithiasis. 4. Sacral decubitus ulcer. Final report electronically signed by Dr. Juliet Boston on 3/13/2022 6:16 PM    IR FLUOROSCOPY LESS THAN 1 HOUR    Result Date: 3/13/2022  PROCEDURE: IR FLUOROSCOPY LESS THAN 1 HOUR CLINICAL INFORMATION: prior neph tube displaced about 2 pm today COMPARISON: CT examination from earlier same day, 2/22/2022 Fluoro Time:4:49 Dose (mGy):46mGy Fluoroscopic images: 29. FINDINGS: With the patient in the prone position and following informed consent the patient's left flank was prepped and draped in normal sterile fashion. An 8 Western Suzi dilator was placed at the tunnel exit site with a nephrostomy catheter once was and contrast was injected. There is a thin tract extending towards the kidney. An attempt was made to advance a Glidewire through the dilator along this tract. However, this was unsuccessful. A Kumpe catheter was then advanced and contrast was injected. Again, there is a thin tract extending towards the kidney. However, there was leakage of contrast into the left perinephric space as well. A Glidewire was advanced through the Kumpe catheter along this tract. The wire coiled along the left perinephric space and did not appear to enter into the renal collecting system.  The wire and Kumpe catheter were removed and the exit site was dressed with gauze and paper tape. The contrast within the left perinephric system limits visualization of the staghorn calculi. Recommend urologic consultation. Patient may require a retrograde ureteral catheter to opacify the collecting system which may aid in the ability to obtain a new percutaneous nephrostomy access. 1. Unsuccessful attempt to regain nephrostomy access into the left kidney due to the lack of a residual patent tract. There is a small tract which was visualized extending towards the left kidney. However, this extravasated into the left perinephric space and the wire coiled within the left perinephric space external to the kidney. The wire could not be redirected into the renal collecting system. There is now contrast in the left perinephric space which limits visualization of the staghorn calculi. Recommend urologic consultation. Patient may require a retrograde ureteral catheter to opacify the collecting system which may aid in the ability to obtain a new percutaneous nephrostomy access. **This report has been created using voice recognition software. It may contain minor errors which are inherent in voice recognition technology. ** Final report electronically signed by Dr. Dre Trevino on 3/13/2022 7:33 PM          Manjit Phillips MD   PGY2, Internal Medicine   3/14/2022

## 2022-03-14 NOTE — DISCHARGE INSTR - DIET

## 2022-03-14 NOTE — PROGRESS NOTES
[facility]  OCCUPATIONAL THERAPY MISSED TREATMENT NOTE  [facility] ICU STEPDOWN TELEMETRY 4K  4K-15/015-A      Date: 3/14/2022  Patient Name: Jean Pierre Graff        CSN: [id number]   : 1950  (73 y.o.)  Gender: female                REASON FOR MISSED TREATMENT: OT attempted at this time although pt off floor for nephrostomy tube placement.  Will check back as able

## 2022-03-14 NOTE — OP NOTE
Department of Radiology  Post Procedure Progress Note      Pre-Procedure Diagnosis:  Staghorn calculus, PCN was recently removed accidentally. Procedure Performed:  Left PCN placement    Anesthesia: local / versed and fentanyl    Findings: successful    Immediate Complications:  None    Estimated Blood Loss: minimal    SEE DICTATED PROCEDURE NOTE FOR COMPLETE DETAILS.     Electronically signed by Marty Ramsey MD on 3/14/2022 at 2:10 PM

## 2022-03-14 NOTE — CARE COORDINATION
3/14/22, 8:39 AM EDT  Discharge Planning Evaluation  Social work consult received, patient from [facility]. Patient/Family preference is to return to [place] [address]. The patient's current payor source at the facility is Medicaid. Medicare skilled days available: Yes  Insurance precert:  No  Spoke with [place] at the facility. Patient bed hold: Yes  Anticipated transport plan: Ambulance  Do they require COVID 19 test to return to ECF: Yes  Is there a required time frame which which COVID test needs done: 24 hours. 3/14/22, 3:10 PM EDT  DISCHARGE PLANNING EVALUATION    DOT faxed transportation forms and tentatively [facility] DOT notified [place] at [place] [address] and he confirmed that they will accept back tomorrow.

## 2022-03-14 NOTE — FLOWSHEET NOTE
03/14/22 1404   Encounter Summary   Services provided to: Patient and family together   Referral/Consult From: Rounding   Continue Visiting Yes  (3/14)   Complexity of Encounter Low   Length of Encounter 15 minutes   Routine   Type Follow up   Assessment Coping   Intervention Prayer   Assessment: In my encounter with the 73 yr old patient in (they were non-responsive) so I had conversation with the patients family. I also came to assess the present spiritual needs of the patient and the family. The pt was admitted due to nephrostomy tube displaced. Interventions:  I provided, prayer, emotional support and words of comfort.  provided a listening presence and encouraged pt to share their beliefs and how they support him during their hospitalization. Outcomes: The patients family was grateful and didnt share any further spiritual needs at this time. The pts family shared that they were appreciative for the support. Plan:  1. Chaplains will follow-up at a later time for assessment of any spiritual care needs present.   The Chaplains will be available to provide further emotional support per request.

## 2022-03-14 NOTE — CARE COORDINATION
3/14/22, 12:23 PM EDT  DISCHARGE PLANNING EVALUATION:    Gypsy Bailono       Admitted: 3/13/2022/ R Yeimy Nunez 8 day: 1   Location: [facility]15/015-A Reason for admit: Nephrostomy tube displaced ([facility] [address] [T83.022A]  Decubitus ulcer of back, stage 3 ([address] [L89.103]   PMH:  has a past medical history of CHF (congestive heart failure) ([address] Depression, Gout attack, Hypertension, Osteoarthritis, Pulmonary artery hypertension ([address] Renal calculi, and Thrombocytopenia ([address] Barriers to Discharge:    Left NT displacement/Left Hydronephrosis. H 8.1; monitor. IVF continued  PCP: Renee Berger MD  Readmission Risk Score: 26 ( )%    Patient Goals/Plan/Treatment Preferences: plans return [facility], has chronic PEG TF, chronic seaman, diverting ostomy; therapy following (mostly bedbound at SNF), sacral wound packing at SNF  Transportation/Food Security/Housekeeping Addressed:  No issues identified.

## 2022-03-14 NOTE — CONSULTS
[address] STEPDOWN TELEMETRY 4K  [address] [facility] [address]  Dept: [phone]  Loc: [phone]  Visit Date: 3/13/2022    Urology Consult Note    Reason for Consult:  dislodged nephrostomy tube  Requesting Physician:  Robbin Mendosa    History Obtained From:  Patient, EMR, nurse    Chief Complaint: dislodged left nephrostomy tube    HISTORY OF PRESENT ILLNESS:                The patient is a 73 y.o. female with significant past medical history of see below who presented with dislodged left nephrostomy tube. Urology was consulted for dislodged nephrostomy tube.   IR attempted to replace last night, will attempt today  Has large staghorn calculus in left kidney, has follow up scheduled 3/29 with Dr Chastity Resendiz  Past Medical History:        Diagnosis Date    CHF (congestive heart failure) ([facility] [address]     Dr. Vanessa Galeano Depression     Gout attack     Hypertension     Osteoarthritis     Pulmonary artery hypertension ([facility] [address]     [facility]-- Dr. Javier Sotomayor-- Dr. Vanessa Galeano Renal calculi     Dr. Montiel Files [facility])      Past Surgical History:        Procedure Laterality Date    APPENDECTOMY  1960    BRONCHOSCOPY N/A 11/22/2021    BRONCHOSCOPY performed by Jazmin Brito MD at [address] 11/30/2021    BRONCHOSCOPY WITHOUT FLURO [facility] N/A 1/27/2022    LAPAROSCOPY WITH DIVERTING LOOP COLOSTOMY performed by Germania Morales MD at [facility] 149 N/A 11/24/2021    EGD PEG TUBE PLACEMENT performed by Raina Stout MD at [facility]    IR [address]  11/26/2021    IR CHEST TUBE INSERTION 11/26/2021 [facility]    IR NEPHROSTOMY PERCUTANEOUS LEFT  11/1/2021    IR NEPHROSTOMY PERCUTANEOUS LEFT 11/1/2021 Pieter Mcfadden MD [facility]    PRESSURE ULCER DEBRIDEMENT Right 8/6/2021    EXCISION RIGHT BUTTOCK WOUND AND CLOSURE performed by Tatiana Lopes MD at [facility] History     Socioeconomic History    Marital status: Single     Spouse name: Not on file    Number of children: 0    Years of education: Not on file    Highest education level: Not on file   Occupational History    Not on file   Tobacco Use    Smoking status: Never Smoker    Smokeless tobacco: Never Used   Vaping Use    Vaping Use: Never used   Substance and Sexual Activity    Alcohol use: No    Drug use: No    Sexual activity: Not Currently   Other Topics Concern    Not on file   Social History Narrative    Not on file     Social Determinants of Health     Financial Resource Strain: Low Risk     Difficulty of Paying Living Expenses: Not very hard   Food Insecurity: No Food Insecurity    Worried About Running Out of Food in the Last Year: Never true    Traci of Food in the Last Year: Never true   Transportation Needs:     Lack of Transportation (Medical): Not on file    Lack of Transportation (Non-Medical):  Not on file   Physical Activity:     Days of Exercise per Week: Not on file    Minutes of Exercise per Session: Not on file   Stress:     Feeling of Stress : Not on file   Social Connections:     Frequency of Communication with Friends and Family: Not on file    Frequency of Social Gatherings with Friends and Family: Not on file    Attends [employer] Services: Not on file    Active Member of [address] [place] [address] [employer] or Organizations: Not on file    Attends Club or Organization Meetings: Not on file    Marital Status: Not on file   Intimate Partner Violence:     Fear of Current or Ex-Partner: Not on file    Emotionally Abused: Not on file    Physically Abused: Not on file    Sexually Abused: Not on file   Housing Stability:     Unable to Pay for Housing in the Last Year: Not on file    Number of [place] in the Last Year: Not on file    Unstable Housing in the Last Year: Not on file       AL  Family History Problem Relation Age of Onset    Diabetes Father    Manuel Armstrong Arthritis Mother    Manuel Armstrong COPD Mother     Diabetes Sister     Heart Disease Maternal Uncle     Breast Cancer Niece 36    Sleep Apnea Brother     Asthma Neg Hx     Birth Defects Neg Hx     Cancer Neg Hx     Depression Neg Hx     Early Death Neg Hx     Hearing Loss Neg Hx     High Blood Pressure Neg Hx     High Cholesterol Neg Hx     Kidney Disease Neg Hx     Learning Disabilities Neg Hx     Mental Illness Neg Hx     Mental Retardation Neg Hx     Miscarriages / Stillbirths Neg Hx     Stroke Neg Hx     Substance Abuse Neg Hx     Vision Loss Neg Hx     Other Neg Hx        Allergies:  No Known Allergies    ROS:  Constitutional: Negative for chills, fatigue, fever, or weight loss. Eyes: Denies reported visual changes. ENT: Denies headache, difficulty swallowing, nose bleeds, ringing in ears, or earaches. Cardiovascular: Negative for chest pain, palpitations, tachycardia or edema. Respiratory: Denies cough or SOB. GI:The patient denies abdominal or flank pain, anorexia, nausea or vomiting. : See HPI  Musculoskeletal: Patient denies low back pain or painful or reduced ROM of the back or extremities. Neurological: The patient denies any symptoms of neurological impairment or TIA's; no history of stroke. Lymphatic: Denies swollen glands in neck, axillary or inguinal areas. Psychiatric: Denies anxiety or depression. Skin: Denies rash or lesions. The remainder of the complete ROS is negative    PHYSICAL EXAM:  VITALS:  BP (!) 157/64   Pulse 80   Temp 98.8 °F (37.1 °C) (Oral)   Resp 16   Ht 4' 9\" (1.448 m)   Wt 133 lb (60.3 kg)   SpO2 99%   BMI 28.78 kg/m² . Nursing note and vitals reviewed. Constitutional:    Alert and oriented times 3, no acute distress and cooperative to examination with appropriate mood and affect. HEENT:   Head:         Normocephalic and atraumatic.    Mouth/Throat:          Mucous membranes are normal.   Eyes: EOM are normal. No scleral icterus. Nose:    The external appearance of the nose is normal  Ears: The ears appear normal to external inspection   Neck:         Supple, symmetrical, trachea midline, no adenopathy, thyroid symmetric, not enlarged and no tenderness. Cardiovascular:        Normal rate, regular rhythm  Pulmonary/Chest:       Chest symmetric with normal A/P diameter,  Normal respiratory rate and rhthym. No use of accessory muscles. Abdominal:          Soft. No tenderness. Dressing to left flank     Genitalia: seaman catheter draining yellow urine  Musculoskeletal:    Normal range of motion. She exhibits no edema or tenderness of lower extremities. Extremities:    No cyanosis, clubbing, or edema present. Neurological:    Alert and oriented. No cranial nerve deficit. There are no focalizing motor or sensory deficits.     DATA:  CBC:   Lab Results   Component Value Date    WBC 10.8 03/14/2022    RBC 2.86 03/14/2022    RBC 4.15 01/09/2018    HGB 8.1 03/14/2022    HCT 26.4 03/14/2022    MCV 92.3 03/14/2022    MCH 28.3 03/14/2022    MCHC 30.7 03/14/2022    RDW 12.9 01/09/2018     03/14/2022    MPV 9.4 03/14/2022     BMP:    Lab Results   Component Value Date     03/14/2022    K 4.1 03/14/2022     03/14/2022    CO2 21 03/14/2022    BUN 95 03/14/2022    CREATININE 1.3 03/14/2022    CALCIUM 9.2 03/14/2022    LABGLOM 41 03/14/2022    GLUCOSE 87 03/14/2022    GLUCOSE 103 01/09/2018     BUN/Creatinine:    Lab Results   Component Value Date    BUN 95 03/14/2022    CREATININE 1.3 03/14/2022     Magnesium:    Lab Results   Component Value Date    MG 1.6 01/26/2022     Phosphorus:    Lab Results   Component Value Date    PHOS 3.9 01/26/2022     PT/INR:    Lab Results   Component Value Date    INR 1.07 01/21/2022     U/A:    Lab Results   Component Value Date    COLORU YELLOW 01/20/2022    PHUR 5.0 01/20/2022    LABCAST NONE SEEN 01/20/2022    LABCAST NONE SEEN 01/20/2022    WBCUA 50-75 01/20/2022    RBCUA  01/20/2022    MUCUS OBSCURED 01/19/2022    MUCUS NONE SEEN 01/19/2022    YEAST NONE SEEN 01/20/2022    BACTERIA NONE SEEN 01/20/2022    SPECGRAV 1.014 01/20/2022    LEUKOCYTESUR MODERATE 01/20/2022    LEUKOCYTESUR MODERATE 01/19/2022    UROBILINOGEN 0.2 01/20/2022    BILIRUBINUR NEGATIVE 01/20/2022    BLOODU LARGE 01/20/2022    GLUCOSEU NEGATIVE 01/19/2022    AMORPHOUS OBSCURED 08/23/2019       Imaging: The patient has had a CT Without Contrast which I have independently reviewed along with its accompanying report. The study demonstrates   Narrative   PROCEDURE: CT ABDOMEN PELVIS WO CONTRAST       CLINICAL INFORMATION: Dislodged left nephrostomy tube       TECHNIQUE: CT of the abdomen and pelvis was performed without use of oral or intravenous contrast. Axial images as well as coronal and sagittal reconstructions were obtained.       All CT scans at this facility use dose modulation, iterative reconstruction, and/or weight-based dosing when appropriate to reduce radiation dose to as low as reasonably achievable.       COMPARISON: CT abdomen and pelvis 1/20/2022       FINDINGS:        Lower thorax: There are small bilateral pleural effusions with adjacent atelectasis/infiltrate.       Abdomen: Evaluation is limited due to absence of contrast. There is no free intraperitoneal air. A new left mid abdominal ostomy contains a loop of adjacent bowel. There is no bowel obstruction. There is a gastrostomy tube in the stomach. Calcified    lesions are present in the lumen of the gallbladder. A left-sided nephrostomy tube is no longer visualized. Bilateral perinephric stranding is more extensive on the left side. There is left-sided hydronephrosis. Calcifications in the left kidney are    stable. Hypoattenuating lesions in the kidneys may be cysts but are incompletely characterized on the current noncontrast examination. There are calcified granulomas in the spleen and liver. Atherosclerotic calcifications are present in the abdominal    aorta without evidence of aneurysm. There is no mesenteric or retroperitoneal lymphadenopathy. Degenerative changes in the thoracolumbar spine are stable.       Pelvis: There is a Padilla catheter in a nondistended urinary bladder, likely accounting for gas within the bladder. Phleboliths are present in the pelvis. There are calcifications in the uterus. There is no pelvic or inguinal lymphadenopathy. There is    persistent subcutaneous tissue irregularity posterior to the sacrum. Degenerative changes are present in the pelvis without evidence of aggressive osseous lesions.           Impression   1. Small bilateral pleural effusions with adjacent atelectasis/infiltrate. .   2. Bilateral perinephric stranding, left-sided hydronephrosis and nephrolithiasis. 3. Cholelithiasis. 4. Sacral decubitus ulcer.          IMPRESSION/Plan:    IR to replace left nephrostomy tube today  Has large staghorn calculus in left kidney, has follow up scheduled 3/29 with Dr Tom Spring to discuss treatment    [id number] [address] for discharge after left nephrostomy replaced in IR    Thank you for including us in the care of [address] - CNP, LANE  03/14/22 1:53 PM  Urology

## 2022-03-15 VITALS
HEART RATE: 89 BPM | WEIGHT: 133 LBS | OXYGEN SATURATION: 95 % | DIASTOLIC BLOOD PRESSURE: 64 MMHG | HEIGHT: 57 IN | TEMPERATURE: 100.2 F | RESPIRATION RATE: 16 BRPM | SYSTOLIC BLOOD PRESSURE: 134 MMHG | BODY MASS INDEX: 28.69 KG/M2

## 2022-03-15 LAB
ANION GAP SERPL CALCULATED.3IONS-SCNC: 14 MEQ/L (ref 8–16)
BUN BLDV-MCNC: 82 MG/DL (ref 7–22)
CALCIUM SERPL-MCNC: 9 MG/DL (ref 8.5–10.5)
CHLORIDE BLD-SCNC: 105 MEQ/L (ref 98–111)
CO2: 19 MEQ/L (ref 23–33)
CREAT SERPL-MCNC: 1.3 MG/DL (ref 0.4–1.2)
GFR SERPL CREATININE-BSD FRML MDRD: 41 ML/MIN/1.73M2
GLUCOSE BLD-MCNC: 85 MG/DL (ref 70–108)
MAGNESIUM: 1.5 MG/DL (ref 1.6–2.4)
PHOSPHORUS: 3.8 MG/DL (ref 2.4–4.7)
POTASSIUM SERPL-SCNC: 4.3 MEQ/L (ref 3.5–5.2)
SARS-COV-2, NAAT: NOT  DETECTED
SODIUM BLD-SCNC: 138 MEQ/L (ref 135–145)

## 2022-03-15 PROCEDURE — 84100 ASSAY OF PHOSPHORUS: CPT

## 2022-03-15 PROCEDURE — 2580000003 HC RX 258: Performed by: STUDENT IN AN ORGANIZED HEALTH CARE EDUCATION/TRAINING PROGRAM

## 2022-03-15 PROCEDURE — 6370000000 HC RX 637 (ALT 250 FOR IP): Performed by: STUDENT IN AN ORGANIZED HEALTH CARE EDUCATION/TRAINING PROGRAM

## 2022-03-15 PROCEDURE — 97110 THERAPEUTIC EXERCISES: CPT

## 2022-03-15 PROCEDURE — 36415 COLL VENOUS BLD VENIPUNCTURE: CPT

## 2022-03-15 PROCEDURE — 80048 BASIC METABOLIC PNL TOTAL CA: CPT

## 2022-03-15 PROCEDURE — 83735 ASSAY OF MAGNESIUM: CPT

## 2022-03-15 PROCEDURE — 97166 OT EVAL MOD COMPLEX 45 MIN: CPT

## 2022-03-15 PROCEDURE — 6360000002 HC RX W HCPCS: Performed by: STUDENT IN AN ORGANIZED HEALTH CARE EDUCATION/TRAINING PROGRAM

## 2022-03-15 PROCEDURE — 87635 SARS-COV-2 COVID-19 AMP PRB: CPT

## 2022-03-15 PROCEDURE — 99239 HOSP IP/OBS DSCHRG MGMT >30: CPT | Performed by: INTERNAL MEDICINE

## 2022-03-15 RX ADMIN — DAKIN'S SOLUTION 0.125% (QUARTER STRENGTH): 0.12 SOLUTION at 08:35

## 2022-03-15 RX ADMIN — FLUOXETINE 40 MG: 20 CAPSULE ORAL at 08:35

## 2022-03-15 RX ADMIN — FOLIC ACID 1 MG: 1 TABLET ORAL at 08:35

## 2022-03-15 RX ADMIN — MAGNESIUM SULFATE HEPTAHYDRATE 3000 MG: 500 INJECTION, SOLUTION INTRAMUSCULAR; INTRAVENOUS at 05:54

## 2022-03-15 RX ADMIN — ASPIRIN 81 MG CHEWABLE TABLET 81 MG: 81 TABLET CHEWABLE at 08:36

## 2022-03-15 RX ADMIN — METOCLOPRAMIDE HYDROCHLORIDE 5 MG: 5 SOLUTION ORAL at 15:52

## 2022-03-15 RX ADMIN — METOCLOPRAMIDE HYDROCHLORIDE 5 MG: 5 SOLUTION ORAL at 05:52

## 2022-03-15 RX ADMIN — METOPROLOL TARTRATE 12.5 MG: 25 TABLET, FILM COATED ORAL at 08:34

## 2022-03-15 RX ADMIN — FAMOTIDINE 20 MG: 20 TABLET ORAL at 08:35

## 2022-03-15 RX ADMIN — ALLOPURINOL 100 MG: 100 TABLET ORAL at 08:36

## 2022-03-15 ASSESSMENT — PAIN SCALES - GENERAL
PAINLEVEL_OUTOF10: 0
PAINLEVEL_OUTOF10: 0

## 2022-03-15 NOTE — PROGRESS NOTES
CLINICAL PHARMACY: DISCHARGE MED RECONCILIATION/REVIEW    [facility] ([facility] Select Patient?: Yes  Total # of Interventions Recommended: 0   -   Total # Interventions Accepted: 0  Intervention Severity:   - Level 1 Intervention Present?: No   - Level 2 #: 0   - Level 3 #: 0   Time Spent (min):  10    Additional Documentation:    Fanny JeffersonD, BCPS   3/15/2022  8:30 AM

## 2022-03-15 NOTE — CARE COORDINATION
3/15/22, 8:30 AM EDT    Patient goals/plan/ treatment preferences discussed by  and . Patient goals/plan/ treatment preferences reviewed with patient/ family. Patient/ family verbalize understanding of discharge plan and are in agreement with goal/plan/treatment preferences. Understanding was demonstrated using the teach back method. AVS provided by RN at time of discharge, which includes all necessary medical information pertaining to the patients current course of illness, treatment, post-discharge goals of care, and treatment preferences. Services After Discharge  Services At/After Discharge: Nursing [facility] ambulance ([id number])   IMM Letter  IMM Letter date given[de-identified] 03/13/22     Patient to discharge to [place] [address] today. Patient and family aware and agreeable to discharge plan. DOT called and confirmed transportation at 6:30pm tonight. DOT called and notified Amaya Bacon at [facility] of transport time. RN made aware and discharge instructions placed on chart.

## 2022-03-15 NOTE — PLAN OF CARE
Problem: Nutrition  Goal: Optimal nutrition therapy  Outcome: Ongoing   Nutrition Problem #1: Severe malnutrition  Intervention: Food and/or Nutrient Delivery: Start Tube Feeding,Continue Current Diet  Nutritional Goals: EN to provide % of nutrient needs during LOS.

## 2022-03-15 NOTE — DISCHARGE SUMMARY
Hospital Medicine Discharge Summary      Patient Identification:   Eulalio Maria   : 1950  MRN: [id number]   Account: [de-identified]      Patient's PCP: Zehra Gleason MD    Admit Date: 3/13/2022     Discharge Date:   3/15/2022    Admitting Physician: Prince Christie MD     Discharge Physician: Padmini Calvert MD     Discharge Diagnoses:  Dislodged Nephrostomy tube due to Left Obstructive Staghorn Calculus: In the setting of recurrent ESBL UTI and associated hydronephrosis. Has had recurrent episodes of tube falling out. Unclear if any specific reason. S/p nephrostomy tube replacement 3/14/22. Has large staghorn calculus in left kidney, follow up scheduled 3/29 with Dr Reece Christie discuss treatment     Stage III Sacral Decubitus Wound: Per documentation, s/p wound vac and was reportedly removed just the day prior to admission. Turn q2h. Daily dressing changes.      Medical Deconditioning/Dehydration: Recent prolonged critical illness and hospital course. Has been bedbound. Continue PTOT/deitician for optimization of nutrition.     CKD 3b: baseline eGFR 40s, Cr 1.3, at baseline at this time. Unclear etiology, in the setting of likely ANCA vasculitis and chronic obstructive uropathy 2/2 staghorn calculi with hydronephrosis. ANCA Abs elevated anti MPO elevated 416 and serine proteinase 3 IgG WNL (per chart review, 10/27/21). Hx CRRT/HD during hospitalization 2021.   -Bumex held during hospital stay due to clinical dehydration. Will resume on discharge.  -Will need renal biopsy for confirmation of ANCA vasculitis. Follow up outpatient with nephrology. -BP ranging 130-140s. Midodrine held to assess need, BP continue to remain WNL. Will discontinue midodrine on discharge. Monitor BP.     Chronic Diastolic HF: Compensated. TTE 10/28/2021 with EF 60%, trace TR. On Bumex 1mg daily and Lopressor 12.5 mg BID.      Pulmonary HTN: On Riociguat     Chronic macrocytic anemia: likely due to MERCEDES/chronic disease.  On ferrous sulfate every other day.      S/p colectomy with diverting loop colostomy: Per Dr. Mary Root 01/27/2022 to allow sacral wound healing. Monitor output.      Hx Anxiety: On prozac and hydroxyzine daily. The patient was seen and examined on day of discharge and this discharge summary is in conjunction with any daily progress note from day of discharge. Hospital Course:   Alcides Lopez is a 73 y.o. female admitted to [address] [place] [address] on 3/13/2022 for dislodged nephrostomy tube. 74 yo F with history of CHF, sacral decubitus wound s/p diverting colostomy, pulmonary HTN, ANCA vasculitis, CKD3b with associated obstructive uropathy secondary staghorn calculus, and chronic anemia, presented from nursing home on 3/13/2022 due to noted dislodged nephrostomy tube. Has had history of dislodgment in the past. No other associated symptoms on admission. Reportedly was placed back in place outpatient in clinic last week.     ED course: Upon arrival, patient was hemodynamically stable with vitals WNL. CT abdomen pelvis revealed small bilateral pleural effusions, bilateral perinephric stranding, left-sided hydronephrosis and nephrolithiasis, and sacral decubitus ulcer.  Labs revealed Na 131, K 4.2, Cl 97, Bicarb 22, , creatinine 1.3, anion gap 12, GFR 41, calcium 9.4. Hgb 9.0. IR attempted to place it back on 3/13 but failed. S/p nephrostomy tube replacement 3/14/2022 by Urology team. Remained afebrile and hemodynamically stable. At her baseline at the time of discharge.        Exam:     Vitals:  Vitals:    03/14/22 2030 03/15/22 0015 03/15/22 0425 03/15/22 0600   BP: (!) 154/70 138/63 (!) 119/49 122/64   Pulse: 88 85 93    Resp: 18 16 16    Temp: 99.6 °F (37.6 °C) 100.2 °F (37.9 °C) 99.9 °F (37.7 °C)    TempSrc: Oral Oral Oral    SpO2: 93% 94% 93%    Weight:       Height:         Weight: Weight: 133 lb (60.3 kg)     24 hour intake/output:    Intake/Output Summary (Last 24 hours) at 3/15/2022 5645  Last data filed at 3/14/2022 2326  Gross per 24 hour   Intake --   Output 1450 ml   Net -1450 ml     General appearance: No apparent distress, appears stated age and cooperative. Chronically ill appearing  HEENT: Conjunctivae/corneas clear. Neck: Supple, with full range of motion. No jugular venous distention. Healed scar from prior tracheostomy tube. Respiratory:  Normal respiratory effort. Clear to auscultation, bilaterally without Rales/Wheezes/Rhonchi. Cardiovascular: Regular rate and rhythm with normal S1/S2 without murmurs, rubs or gallops. Abdomen: Soft, non-tender, non-distended with normal bowel sounds. Colostomy bag in place with brown stool in bag. Musculoskeletal: passive and active ROM x 4 extremities. Skin: Skin color, texture, turgor normal. No rashes. Stage III sacral wound  Neurologic: Neurovascularly intact without any focal sensory/motor deficits. Cranial nerves: II-XII intact, grossly non-focal. Slow but oriented  Psychiatric: Alert and oriented, thought content appropriate, normal insight  Capillary Refill: Brisk,< 3 seconds   Peripheral Pulses: +2 palpable, equal bilaterally    Labs: For convenience and continuity at follow-up the following most recent labs are provided:      CBC:    Lab Results   Component Value Date    WBC 10.8 03/14/2022    HGB 8.1 03/14/2022    HCT 26.4 03/14/2022     03/14/2022       Renal:    Lab Results   Component Value Date     03/15/2022    K 4.3 03/15/2022    K 4.1 03/14/2022     03/15/2022    CO2 19 03/15/2022    BUN 82 03/15/2022    CREATININE 1.3 03/15/2022    CALCIUM 9.0 03/15/2022    PHOS 3.8 03/15/2022         Significant Diagnostic Studies    Radiology:   IR GUIDED NEPHROSTOMY CATH PLACEMENT LEFT   Final Result   Successful placement of a left-sided 8 [place] locking pigtail nephrostomy catheters as described above. **This report has been created using voice recognition software.   It may contain minor errors which are inherent in voice recognition technology. **      Final report electronically signed by Dr Martha Sen on 3/14/2022 2:16 PM      IR FLUOROSCOPY LESS THAN 1 HOUR   Final Result   1. Unsuccessful attempt to regain nephrostomy access into the left kidney due to the lack of a residual patent tract. There is a small tract which was visualized extending towards the left kidney. However, this extravasated into the left perinephric    space and the wire coiled within the left perinephric space external to the kidney. The wire could not be redirected into the renal collecting system. There is now contrast in the left perinephric space which limits visualization of the staghorn calculi. Recommend urologic consultation. Patient may require a retrograde ureteral catheter to opacify the collecting system which may aid in the ability to obtain a new percutaneous nephrostomy access. **This report has been created using voice recognition software. It may contain minor errors which are inherent in voice recognition technology. **      Final report electronically signed by Dr. Tabatha Lancaster on 3/13/2022 7:33 PM      CT ABDOMEN PELVIS WO CONTRAST Additional Contrast? None   Final Result   1. Small bilateral pleural effusions with adjacent atelectasis/infiltrate. .   2. Bilateral perinephric stranding, left-sided hydronephrosis and nephrolithiasis. 3. Cholelithiasis. 4. Sacral decubitus ulcer. Final report electronically signed by Dr. Dakota Menchaca on 3/13/2022 6:16 PM             Consults:     IP CONSULT TO UROLOGY  IP CONSULT TO WOUND CARE PROVIDER  IP CONSULT TO SOCIAL WORK  IP CONSULT TO DIETITIAN    Disposition: SNF  Condition at Discharge: Stable    Code Status:  Full Code     Patient Instructions:    Discharge lab work: Activity: activity as tolerated  Diet: ADULT DIET;  Dysphagia - Pureed      Follow-up visits:   [facility] [address] [address]  [phone]              [facility] physician to follow    Celsa Riddle MD  [address]  [phone]    On 3/21/2022  your appointment time is at 1:15p, Please arrive 15mins early, Bring insurance card         Discharge Medications:        Medication List      CONTINUE taking these medications    acetaminophen 650 MG extended release tablet  Commonly known as: TYLENOL     Adempas 2.5 MG Tabs  Generic drug: Riociguat     allopurinol 100 MG tablet  Commonly known as: ZYLOPRIM     aspirin 81 MG tablet     bumetanide 1 MG tablet  Commonly known as: BUMEX  Take 1 tablet by mouth daily     docusate sodium 100 MG capsule  Commonly known as: COLACE     famotidine 20 MG tablet  Commonly known as: PEPCID     ferrous sulfate 325 (65 Fe) MG tablet  Commonly known as: IRON 325  Take 1 tablet by mouth every other day     FLUoxetine 40 MG capsule  Commonly known as: PROZAC  Take 1 capsule by mouth daily     folic acid 1 MG tablet  Commonly known as: FOLVITE  Take 1 tablet by mouth daily     hydrOXYzine 25 MG tablet  Commonly known as: ATARAX     melatonin 3 MG Tabs tablet  Take 2 tablets by mouth nightly as needed (anxiety, sleep)     metoclopramide 5 MG/5ML solution  Commonly known as: REGLAN     metoprolol tartrate 25 MG tablet  Commonly known as: LOPRESSOR  Take 0.5 tablets by mouth 2 times daily     MULTIVITAMIN WOMEN PO     potassium chloride 10 MEQ extended release tablet  Commonly known as: KLOR-CON M  Take 1 tablet by mouth every other day     senna 8.8 MG/5ML Syrp syrup  Commonly known as: SENOKOT     sodium chloride 1 g tablet  Take 1 tablet by mouth 2 times daily     sodium hypochlorite 0.125 % Soln external solution  Commonly known as: DAKINS  Apply Dakin's moistened gauze dressings to wound twice daily and as needed.         STOP taking these medications    midodrine 5 MG tablet  Commonly known as: PROAMATINE            Discharge Medications:-      Medication List      CONTINUE taking these medications    acetaminophen 650 MG extended release tablet  Commonly known as: TYLENOL     Adempas 2.5 MG Tabs  Generic drug: Riociguat     allopurinol 100 MG tablet  Commonly known as: ZYLOPRIM     aspirin 81 MG tablet     bumetanide 1 MG tablet  Commonly known as: BUMEX  Take 1 tablet by mouth daily     docusate sodium 100 MG capsule  Commonly known as: COLACE     famotidine 20 MG tablet  Commonly known as: PEPCID     ferrous sulfate 325 (65 Fe) MG tablet  Commonly known as: IRON 325  Take 1 tablet by mouth every other day     FLUoxetine 40 MG capsule  Commonly known as: PROZAC  Take 1 capsule by mouth daily     folic acid 1 MG tablet  Commonly known as: FOLVITE  Take 1 tablet by mouth daily     hydrOXYzine 25 MG tablet  Commonly known as: ATARAX     melatonin 3 MG Tabs tablet  Take 2 tablets by mouth nightly as needed (anxiety, sleep)     metoclopramide 5 MG/5ML solution  Commonly known as: REGLAN     metoprolol tartrate 25 MG tablet  Commonly known as: LOPRESSOR  Take 0.5 tablets by mouth 2 times daily     MULTIVITAMIN WOMEN PO     potassium chloride 10 MEQ extended release tablet  Commonly known as: KLOR-CON M  Take 1 tablet by mouth every other day     senna 8.8 MG/5ML Syrp syrup  Commonly known as: SENOKOT     sodium chloride 1 g tablet  Take 1 tablet by mouth 2 times daily     sodium hypochlorite 0.125 % Soln external solution  Commonly known as: DAKINS  Apply Dakin's moistened gauze dressings to wound twice daily and as needed. STOP taking these medications    midodrine 5 MG tablet  Commonly known as: PROAMATINE             Time Spent on discharge is more than 1 hour in the examination, evaluation, counseling and review of medications and discharge plan.       Signed:    Electronically signed by Marisa Phillips MD on 3/15/2022 at 8:13 AM

## 2022-03-15 NOTE — PROGRESS NOTES
currently working on consuming 3 pureed meals/day and will adjust tube feeding rate as necessary depending on PO intake of meals. GI Status: pt denies N/V/C/D  Pertinent Labs: Na 138, K 4.3, BUN 82, creatinine 1.3, magnesium 1.5, glucose 85, phos 3.8, hgb 8.1  Pertinent Meds: zyloprim, bumex (on hold), pepcid, iron, folvite, prozac, reglan    Wounds:  Stage III (sacral decub ulcer)       Current Nutrition Therapies:    ADULT DIET; Dysphagia - Pureed  ADULT TUBE FEEDING; PEG; Renal Formula; Continuous; 40; No; 300; Q 8 hours  Current Tube Feeding (TF) Orders:  · Feeding Route: PEG  · Formula: Renal Formula (Nepro)  · Schedule: Continuous @ 40 ml/hr   · Additives/Modulars:  None  · Water Flushes: provide free water flushes of 300 ml q 8 hours  · Current TF & Flush Orders Provides: TF to be started   · Goal TF & Flush Orders Provides: Nepro @ 40 ml//hr to provide: 1700 kcal, 153.6 g CHO, 77.8 g pro, 24 g fiber, 697 ml free water in total volume 1860 ml (960 ml from TF + water flushes of 900 ml)      Anthropometric Measures:  · Height: 4' 9\" (144.8 cm)  · Current Body Weight: 133 lb (60.3 kg) (3/13)   · Admission Body Weight: 133 lb (60.3 kg) (3/13)    · Usual Body Weight: 170 lb (77.1 kg) (weight history: 1/20/22: 144 lbs 13.5 oz, 10/27/21: 168 lbs 9.0 oz, 8/12/21: 170 lbs, 8/6/21: 173 lbs)     · Ideal Body Weight: 85 lbs;      · BMI: 28.8  · Adjusted Body Weight:  ; No Adjustment    · BMI Categories: Overweight (BMI 25.0-29. 9)       Nutrition Diagnosis:   · Severe malnutrition related to altered GI function,increase demand for energy/nutrients,swallowing difficulty as evidenced by weight loss,severe loss of subcutaneous fat,severe muscle loss      Nutrition Interventions:   Food and/or Nutrient Delivery:  Start Tube Feeding,Continue Current Diet  Nutrition Education/Counseling:  No recommendation at this time   Coordination of Nutrition Care:  Continue to monitor while inpatient    Goals:  EN to provide % of nutrient needs during LOS.        Nutrition Monitoring and Evaluation:   Behavioral-Environmental Outcomes:  None Identified   Food/Nutrient Intake Outcomes:  Diet Advancement/Tolerance,Enteral Nutrition Intake/Tolerance,Food and Nutrient Intake  Physical Signs/Symptoms Outcomes:  Biochemical Data,Chewing or Swallowing,GI Status,Nausea or Vomiting,Fluid Status or Edema,Skin,Weight,Nutrition Focused Physical Findings     Discharge Planning:    Enteral Nutrition,Continue current diet     Electronically signed by Nyasia Jensen RD on 3/15/22 at 12:12 PM EDT    Contact: [phone]

## 2022-03-15 NOTE — PROGRESS NOTES
[facility] 62  INPATIENT OCCUPATIONAL THERAPY  [facility] ICU STEPDOWN TELEMETRY 4K  EVALUATION    Time:   Time In: 0900  Time Out: 0930  Timed Code Treatment Minutes: 15 Minutes  Minutes: 30          Date: 3/15/2022  Patient Name: Mora Tran,   Gender: female      MRN: [id number]  : 1950  (73 y.o.)  Referring Practitioner: Dr. Sharyl Simmonds, DO  Diagnosis: Nephrostomy Tube Displaced  Additional Pertinent Hx: Pt with history of CHF, sacral decubitus wound s/p diverting colostomy, pulmonary HTN, ANCA vasculitis, CKD3b with associated obstructive uropathy secondary staghorn calculus, and chronic anemia, presented from nursing home on 3/13/2022 due to noted dislodged nephrostomy tube. Has had history of dislodgment in the past. No other associated symptoms on admission. Reportedly tube was put back in place outpatient in clinic last week. Pt had placement of nephrostomy tube on 3/14/22. Restrictions/Precautions:  Position Activity Restriction  Other position/activity restrictions: sacral wound    Subjective  Chart Reviewed: Alex Geronimo and Physical  Family / Caregiver Present: No    Subjective: Pleasant  Comments: RN approved session. Pt agreed to work with therapy after she had finished her breakfast.    Pain:  Pain Assessment  Patient Currently in Pain: Denies    Vitals: Vitals not assessed per clinical judgement, see nursing flowsheet    Social/Functional History:  Lives With: Alone  Type of Home: Facility ([facility])  Home Layout: One level  Home Access: Level entry           ADL Assistance: Needs assistance  Homemaking Assistance: Needs assistance  Homemaking Responsibilities: No    Active : No  Patient's  Info: Facility provides transportation  Leisure & Hobbies: Reading, watching TV- news or [employer]; has a cat which is being taken care of at this time. Additional Comments: Pt had help with doing her bathing and dressing.   She did some of her grooming and her feeding. Pt did not get up out of bed, she states. She was a poor historian and unable to provide much details about her care. VISION:Corrected    HEARING:  WFL    COGNITION: Slow Processing, Impaired Memory, Decreased Problem Solving and easily distracted    RANGE OF MOTION:  Bilateral Upper Extremity:  WFL    STRENGTH:  Bilateral Upper Extremity:  Impaired - 3+/5 deltoid and pectoral ms; 4-/5 biceps and triceps    SENSATION:   WFL    ADL:   Feeding: with set-up. Pt fed self ice chips and was eating her breakfast without difficulty   Pt refused doing her grooming as she had her hair combed already. She indicated she does her mouth care and will take care of that later today . BALANCE:  Pt did not sit at the edge of bed secondary to sacral wound    BED MOBILITY:  Not Tested    TRANSFERS:  Not applicable    Exercise:  Pt completed BUE AROM exercises x 10 reps x 2 set/s in all joints/planes. Pt also did bench presses while in supine with head of bed elevated and holding onto a 1 lb object. Pt completed BLE AAROM exercises including ankle pumps and slow stretching of heel chords completed. Tightness noted with R more than in L heel chord. Completed heel slides, leg abduction/adduction and hip internal rotation/external rotation x 8 reps each. Exercises completed to increase pt's strength or endurance for ease of doing self care and functional mobility. Activity Tolerance:  Patient tolerance of  treatment: fair. Pt was fatigued with ROM exercises. Assessment:  Assessment: Patient would benefit from continued skilled OT services to address above deficits. She presents with nephrostomy tube displacement. Pt has had hx of R buttock wound in August.  She had colectomy with diverting colostomy in January. She also had PEG tube placed in November as well as a nephrostomy tube. She had to have the nephrostomy tube placed this admission. Pt is a poor historian.   She reported that she had not been getting out of bed. She did spongebaths with help from the staff at nursing facility. She demonstrated tightness in her heel chords, OA in her R ankle and weakness overall. She did her own feeding of her breakfast.  She asked to do exercises. Pt participated in ROM and light resistance exercises with BUE and BLE. Tolerated fair. Performance deficits / Impairments: Decreased ADL status,Decreased ROM,Decreased strength,Decreased endurance,Decreased cognition  Prognosis: Good  REQUIRES OT FOLLOW UP: Yes  Decision Making: Medium Complexity    Treatment Initiated: Treatment and education initiated within context of evaluation. Evaluation time included review of current medical information, gathering information related to past medical, social and functional history, completion of standardized testing, formal and informal observation of tasks, assessment of data and development of plan of care and goals. Treatment time included skilled education and facilitation of tasks to increase safety and independence with ADL's for improved functional independence and quality of life. Pt stated her goal was to get stronger. She agreed that she needed to work on getting her muscles and joints prepared for eventually doing activities while out of bed.     Discharge Recommendations:  ECF with OT,Patient would benefit from continued therapy after discharge    Patient Education:  OT Education: Karen Mccartney of Care,Orientation  Patient Education: Importance of keeping her muscles strong and stretching her heel chords as she is able so that she can do functional activity such as be able to stand while having her heels touch the floor    Equipment Recommendations:  Equipment Needed: No    Plan:  Times per week: 3x  Current Treatment Recommendations: Strengthening,Self-Care / ADL,Endurance Training,Functional Mobility Training,Safety Education & Training,Cognitive Reorientation  Plan Comment: Pt would benefit from continued skilled OT services when medically stable and discharged from Acute. OT recommended while at [facility]. Specific instructions for Next Treatment: ADLs and endurance training; ROM and light strengthening exercises; reorientation. See long-term goal time frame for expected duration of plan of care. If no long-term goals established, a short length of stay is anticipated. Goals:  Patient goals : \"Get stronger. \" pt states. Short term goals  Time Frame for Short term goals: By discharge  Short term goal 1: Pt will participate in upper body ADLs for over 10 minute duration with setup A and little rest breaks to increase her endurance and independence with self care. Short term goal 2: Pt will complete BUE ROM/light resistance exerises while following demonstration or any handout needed to increase her endurance and strength for ease of doing ADLs and functional transfers. Short term goal 3: Pt will sit up at the edge of bed with OTR as appropriate to prepare for doing her self care in a more upright position and prepare for getting up out of bed when able. Following session, patient left in safe position with all fall risk precautions in place.

## 2022-03-16 ENCOUNTER — TELEPHONE (OUTPATIENT)
Dept: FAMILY MEDICINE CLINIC | Age: 72
End: 2022-03-16

## 2022-03-16 NOTE — TELEPHONE ENCOUNTER
Pt's sister Kyra So answered and states pt is in [facility] for Rehab. She also is unable to bring pt to hospital follow up appt. I told her that if Jia Leighton needs anything from us to call us and give us a call when she is released from nursing home, she agreed.

## 2022-03-24 LAB
BUN BLDV-MCNC: 79 MG/DL
CALCIUM SERPL-MCNC: 8.4 MG/DL
CHLORIDE BLD-SCNC: 104 MMOL/L
CO2: 20 MMOL/L
CREAT SERPL-MCNC: 1.4 MG/DL
GFR CALCULATED: 37
GLUCOSE BLD-MCNC: 84 MG/DL
POTASSIUM SERPL-SCNC: 5.2 MMOL/L
SODIUM BLD-SCNC: 136 MMOL/L

## 2022-03-28 ENCOUNTER — TELEPHONE (OUTPATIENT)
Dept: NEPHROLOGY | Age: 72
End: 2022-03-28

## 2022-03-28 ENCOUNTER — HOSPITAL ENCOUNTER (OUTPATIENT)
Dept: WOUND CARE | Age: 72
Discharge: HOME | End: 2022-03-28
Payer: COMMERCIAL

## 2022-03-28 VITALS
SYSTOLIC BLOOD PRESSURE: 127 MMHG | OXYGEN SATURATION: 99 % | HEART RATE: 84 BPM | DIASTOLIC BLOOD PRESSURE: 60 MMHG | TEMPERATURE: 98 F | RESPIRATION RATE: 18 BRPM

## 2022-03-28 DIAGNOSIS — L89.154 PRESSURE INJURY OF SACRAL REGION, STAGE 4 (HCC): Primary | ICD-10-CM

## 2022-03-28 DIAGNOSIS — S91.105A OPEN WOUND OF SECOND TOE OF LEFT FOOT, INITIAL ENCOUNTER: ICD-10-CM

## 2022-03-28 DIAGNOSIS — S91.104A OPEN WOUND OF SECOND TOE OF RIGHT FOOT, INITIAL ENCOUNTER: ICD-10-CM

## 2022-03-28 PROCEDURE — 17250 CHEM CAUT OF GRANLTJ TISSUE: CPT

## 2022-03-28 PROCEDURE — 6370000000 HC RX 637 (ALT 250 FOR IP): Performed by: NURSE PRACTITIONER

## 2022-03-28 PROCEDURE — 17250 CHEM CAUT OF GRANLTJ TISSUE: CPT | Performed by: NURSE PRACTITIONER

## 2022-03-28 PROCEDURE — 99214 OFFICE O/P EST MOD 30 MIN: CPT | Performed by: NURSE PRACTITIONER

## 2022-03-28 RX ORDER — GINSENG 100 MG
CAPSULE ORAL ONCE
Status: CANCELLED | OUTPATIENT
Start: 2022-03-28 | End: 2022-03-28

## 2022-03-28 RX ORDER — LIDOCAINE HYDROCHLORIDE 20 MG/ML
JELLY TOPICAL ONCE
Status: CANCELLED | OUTPATIENT
Start: 2022-03-28 | End: 2022-03-28

## 2022-03-28 RX ORDER — LIDOCAINE 50 MG/G
OINTMENT TOPICAL ONCE
Status: CANCELLED | OUTPATIENT
Start: 2022-03-28 | End: 2022-03-28

## 2022-03-28 RX ORDER — BACITRACIN ZINC AND POLYMYXIN B SULFATE 500; 1000 [USP'U]/G; [USP'U]/G
OINTMENT TOPICAL ONCE
Status: CANCELLED | OUTPATIENT
Start: 2022-03-28 | End: 2022-03-28

## 2022-03-28 RX ORDER — BETAMETHASONE DIPROPIONATE 0.05 %
OINTMENT (GRAM) TOPICAL ONCE
Status: CANCELLED | OUTPATIENT
Start: 2022-03-28 | End: 2022-03-28

## 2022-03-28 RX ORDER — BACITRACIN, NEOMYCIN, POLYMYXIN B 400; 3.5; 5 [USP'U]/G; MG/G; [USP'U]/G
OINTMENT TOPICAL ONCE
Status: CANCELLED | OUTPATIENT
Start: 2022-03-28 | End: 2022-03-28

## 2022-03-28 RX ORDER — LIDOCAINE 40 MG/G
CREAM TOPICAL ONCE
Status: CANCELLED | OUTPATIENT
Start: 2022-03-28 | End: 2022-03-28

## 2022-03-28 RX ORDER — CLOBETASOL PROPIONATE 0.5 MG/G
OINTMENT TOPICAL ONCE
Status: CANCELLED | OUTPATIENT
Start: 2022-03-28 | End: 2022-03-28

## 2022-03-28 RX ORDER — LIDOCAINE HYDROCHLORIDE 40 MG/ML
SOLUTION TOPICAL ONCE
Status: CANCELLED | OUTPATIENT
Start: 2022-03-28 | End: 2022-03-28

## 2022-03-28 RX ORDER — GENTAMICIN SULFATE 1 MG/G
OINTMENT TOPICAL ONCE
Status: CANCELLED | OUTPATIENT
Start: 2022-03-28 | End: 2022-03-28

## 2022-03-28 RX ADMIN — SILVER NITRATE APPLICATORS 2 EACH: 25; 75 STICK TOPICAL at 13:48

## 2022-03-28 NOTE — PROGRESS NOTES
[address] [facility] and Procedure Note      Alcides Lopez  MEDICAL RECORD NUMBER:  [id number]  AGE: 73 y.o. GENDER: female  : 1950  EPISODE DATE:  3/28/2022    SUBJECTIVE:     Chief Complaint   Patient presents with    Wound Check     coccyx        HISTORY OF PRESENT ILLNESS      Alcides Lopez is a 73 y.o. female who presents today for evaluation of sacral wound and ostomy care. Patient previously followed me for wound, was healing nicely. Since that time she had lengthy hospital stay related to respiratory failure, spent some time [facility] and has since been discharged to [place] [id number]. Sacral wound worsened during stay requiring surgical debridement and diverting colostomy. Peg tube was inserted for nutritional needs, patient reports that she has recently started taking small amounts in by mouth. Patient is mostly bedbound, has been working with therapy at facility. States that they have been talking about having to discharge from therapy due to failure to progress. Current ordered wound care includes negative pressure wound therapy, changed three times weekly. She presents today with wet to dry dressing in place. No further needs or concerns identified.     PAST MEDICAL HISTORY             Diagnosis Date    CHF (congestive heart failure) ([facility])     Dr. Cece Lyles Depression     Gout attack     Hypertension     Osteoarthritis     Pulmonary artery hypertension ([facility] [address]     [facility]-- Dr. Alaina Sung-- Dr. Cece Lyles Renal calculi     Dr. Jillian Willard Thrombocytopenia [facility])        PAST SURGICAL HISTORY     Past Surgical History:   Procedure Laterality Date    APPENDECTOMY      BRONCHOSCOPY N/A 2021    BRONCHOSCOPY performed by Clementine Almonte MD at [address] 2021    BRONCHOSCOPY WITHOUT FLURO performed by Clementine Almonte MD at [facility]    COLECTOMY N/A 2022    LAPAROSCOPY WITH DIVERTING LOOP COLOSTOMY performed by Bandar Anderson MD at [facility] N/A 11/24/2021    EGD PEG TUBE PLACEMENT performed by Jewel Caruso MD at [facility]    IR [address]  11/26/2021    IR CHEST TUBE INSERTION 11/26/2021 [facility]    IR NEPHROSTOMY PERCUTANEOUS LEFT  11/1/2021    IR NEPHROSTOMY PERCUTANEOUS LEFT 11/1/2021 Pieter Mckeon MD [facility]    IR NEPHROSTOMY PERCUTANEOUS LEFT  3/14/2022    IR NEPHROSTOMY PERCUTANEOUS LEFT 3/14/2022 Alfonso Sawyer MD [facility]    PRESSURE ULCER DEBRIDEMENT Right 8/6/2021    EXCISION RIGHT BUTTOCK WOUND AND CLOSURE performed by Nitish Shook MD at [address] [place] [address] HISTORY     Family History   Problem Relation Age of Onset    Diabetes Father    Steve Outhouse Arthritis Mother    Steve Outhouse COPD Mother     Diabetes Sister     Heart Disease Maternal Uncle     Breast Cancer Niece 36    Sleep Apnea Brother     Asthma Neg Hx     Birth Defects Neg Hx     Cancer Neg Hx     Depression Neg Hx     Early Death Neg Hx     Hearing Loss Neg Hx     High Blood Pressure Neg Hx     High Cholesterol Neg Hx     Kidney Disease Neg Hx     Learning Disabilities Neg Hx     Mental Illness Neg Hx     Mental Retardation Neg Hx     Miscarriages / Stillbirths Neg Hx     Stroke Neg Hx     Substance Abuse Neg Hx     Vision Loss Neg Hx     Other Neg Hx        SOCIAL HISTORY     Social History     Tobacco Use    Smoking status: Never Smoker    Smokeless tobacco: Never Used   Vaping Use    Vaping Use: Never used   Substance Use Topics    Alcohol use: No    Drug use: No       ALLERGIES     No Known Allergies    MEDICATIONS     Current Outpatient Medications on File Prior to Encounter   Medication Sig Dispense Refill    folic acid (FOLVITE) 1 MG tablet Take 1 tablet by mouth daily 30 tablet 3    ferrous sulfate (IRON 325) 325 (65 Fe) MG tablet Take 1 tablet by mouth every other day 30 tablet 1  melatonin 3 MG TABS tablet Take 2 tablets by mouth nightly as needed (anxiety, sleep) 30 tablet 1    famotidine (PEPCID) 20 MG tablet 20 mg by PEG Tube route daily      hydrOXYzine (ATARAX) 25 MG tablet 25 mg by PEG Tube route every 8 hours as needed for Anxiety      metoclopramide (REGLAN) 5 MG/5ML solution 5 mg by PEG Tube route every 8 hours      senna (SENOKOT) 8.8 MG/5ML SYRP syrup Take 5 mLs by mouth nightly as needed      metoprolol tartrate (LOPRESSOR) 25 MG tablet Take 0.5 tablets by mouth 2 times daily 90 tablet 1    sodium hypochlorite (DAKINS) 0.125 % SOLN external solution Apply Dakin's moistened gauze dressings to wound twice daily and as needed. 1 Bottle 2    sodium chloride 1 g tablet Take 1 tablet by mouth 2 times daily 240 tablet 3    FLUoxetine (PROZAC) 40 MG capsule Take 1 capsule by mouth daily 90 capsule 3    potassium chloride (KLOR-CON M) 10 MEQ extended release tablet Take 1 tablet by mouth every other day 90 tablet 3    bumetanide (BUMEX) 1 MG tablet Take 1 tablet by mouth daily 60 tablet 11    Multiple Vitamins-Minerals (MULTIVITAMIN WOMEN PO) Take 1 tablet by mouth daily      acetaminophen (TYLENOL) 650 MG extended release tablet Take 650 mg by mouth every 8 hours as needed for Pain      Riociguat (ADEMPAS) 2.5 MG TABS Take 2.5 mg by mouth 3 times daily      docusate sodium (COLACE) 100 MG capsule Take 100 mg by mouth as needed       aspirin 81 MG tablet Take 81 mg by mouth daily        No current facility-administered medications on file prior to encounter.        REVIEW OF SYSTEMS:     Constitutional: Denies fever, chills, night sweats  Respiratory: Denies shortness of breath, cough, wheezing, dyspnea with exertion  Cardiovascular:Denies chest pain, palpitations, edema  Gastrointestinal: +colostomy, PEG tube Denies nausea, vomiting, constipation, diarrhea, abdominal pain   DARÍO: +nephrostomy tube, seaman catheter  Musculoskeletal: +generalized muscle weakness Endocrine: Denies polyuria, polydipsia, cold or heat intolerance  Hematology: Denies easy brusing or bleeding, hx of clotting disorder  Dermatology: +wound Denies skin rash, eczema, pruritis    PHYSICAL EXAM:     /60   Pulse 84   Temp 98 °F (36.7 °C) (Infrared)   Resp 18   SpO2 99%   Wt Readings from Last 3 Encounters:   03/13/22 133 lb (60.3 kg)   03/04/22 139 lb (63 kg)   02/22/22 133 lb (60.3 kg)     General:  Awake, alert, no apparent distress. Chronically ill appearing  HEENT: conjuctivae are clear without exudate or hemorrhage, anicteric sclera, moist oral mucosa. Chest:  Respirations regular, non-labored. Chest rise and fall equal bilaterally. GI/:  Soft, non tender to palpation. Right upper quadrant PEG tube. Padilla catheter draining yellow urine with mucus stranding. Neurological: Awake, alert, delayed responses  Psychiatric:  Appropriate mood and affect  Extremities: non-traumatic in appearance. Skin:  Warm and dry  Wound:    Sacral wound bed granular with slough and purple discoloration. Large amounts of serosanguinous drainage noted. Periwound denuded with hyperpigmentation and blanchable erythema. Right second toe wound bed hypergranular with moderate amounts of serosanguinous drainage. Periwound dry, intact. No warmth, redness, edema, or fluctuance noted. Left second toe wound bed hypergranular with moderate amounts of serosanguinous drainage. Periwound dry, intact. No warmth, redness, edema, or fluctuance noted. Wound 08/24/21 Sacrum (Active)   Wound Image   03/28/22 1319   Wound Etiology Pressure Stage  4 03/28/22 1319   Dressing Status Intact; Old drainage noted;New dressing applied 03/28/22 1319   Wound Cleansed Cleansed with saline 03/28/22 1319   Dressing/Treatment Moist to dry;ABD 03/28/22 1319   Offloading for Diabetic Foot Ulcers Offloading not required 03/28/22 1319   Wound Length (cm) 4 cm 03/28/22 1319   Wound Width (cm) 6 cm 03/28/22 1319   Wound Depth (cm) 3.9 cm 03/28/22 1319   Wound Surface Area (cm^2) 24 cm^2 03/28/22 1319   Change in Wound Size % (l*w) 8.57 03/28/22 1319   Wound Volume (cm^3) 93.6 cm^3 03/28/22 1319   Wound Healing % 6 03/28/22 1319   Distance Tunneling (cm) 4 cm 09/29/21 1322   Tunneling Position ___ O'Clock 12 09/29/21 1322   Undermining Starts ___ O'Clock 12 03/28/22 1319   Undermining Ends___ O'Clock 12 03/28/22 1319   Undermining Maxium Distance (cm) 3 03/28/22 1319   Wound Assessment Granulation tissue;Pale granulation tissue;Slough; Purple/maroon 03/28/22 1319   Drainage Amount Large 03/28/22 1319   Drainage Description Serosanguinous 03/28/22 1319   Odor None 03/28/22 1319   Katey-wound Assessment Maceration;Blanchable erythema;Denuded 03/28/22 1319   Margins Unattached edges 03/28/22 1319   Wound Thickness Description not for Pressure Injury Full thickness 03/28/22 1319   Number of days: 216       Wound 03/13/22 Toe (Comment  which one) Anterior; Left left second toe (Active)   Wound Image   03/28/22 1325   Wound Etiology Other 03/15/22 0823   Dressing Status Intact; Old drainage noted;New dressing applied 03/28/22 1325   Wound Cleansed Cleansed with saline 03/28/22 1325   Dressing/Treatment Alginate;Dry dressing 03/28/22 1325   Offloading for Diabetic Foot Ulcers Offloading not ordered 03/28/22 1325   Wound Length (cm) 1.8 cm 03/28/22 1325   Wound Width (cm) 2.5 cm 03/28/22 1325   Wound Depth (cm) 0 cm 03/28/22 1325   Wound Surface Area (cm^2) 4.5 cm^2 03/28/22 1325   Wound Volume (cm^3) 0 cm^3 03/28/22 1325   Wound Assessment Hyper granulation tissue 03/28/22 1325   Drainage Amount Moderate 03/28/22 1325   Drainage Description Sanguinous 03/28/22 1325   Odor None 03/28/22 1325   Katey-wound Assessment Dry/flaky; Intact 03/28/22 1325   Margins Attached edges 03/28/22 1325   Number of days: 14       Wound 03/13/22 Toe (Comment  which one) Anterior;Right right second toe (Active)   Wound Image   03/28/22 1326   Wound Etiology Other 03/15/22 0823   Dressing Status Intact; Old drainage noted;New dressing applied 03/28/22 1326   Wound Cleansed Cleansed with saline 03/28/22 1326   Dressing/Treatment Alginate;Dry dressing 03/28/22 1326   Offloading for Diabetic Foot Ulcers Offloading not ordered 03/28/22 1326   Wound Length (cm) 1 cm 03/28/22 1326   Wound Width (cm) 1 cm 03/28/22 1326   Wound Depth (cm) 0 cm 03/28/22 1326   Wound Surface Area (cm^2) 1 cm^2 03/28/22 1326   Wound Volume (cm^3) 0 cm^3 03/28/22 1326   Wound Assessment Hyper granulation tissue 03/28/22 1326   Drainage Amount Moderate 03/28/22 1326   Drainage Description Sanguinous 03/28/22 1326   Odor None 03/28/22 1326   Katey-wound Assessment Dry/flaky; Intact 03/28/22 1326   Margins Attached edges 03/28/22 1326   Number of days: 14         LABS/IMAGING     UA: No results for input(s): SPECGRAV, PHUR, COLORU, CLARITYU, MUCUS, PROTEINU, BLOODU, RBCUA, WBCUA, BACTERIA, NITRU, GLUCOSEU, BILIRUBINUR, UROBILINOGEN, KETUA, LABCAST, LABCASTTY, AMORPHOS in the last 72 hours. Invalid input(s): CRYSTALS  Micro:   Lab Results   Component Value Date    BC No growth-preliminary No growth  01/21/2022       ASSESSMENT     -Stage IV pressure injury, sacrum  -Open wound, second toe, right and left foot  -Hypergranulation    Ulcer Identification:  Ulcer Type: pressure  Contributing Factors: chronic pressure, decreased mobility and shear force    Depth of Diabetic/Pressure/Non Pressure Ulcers or Wound:  Pressure ulcer, Stage 4     Patient Active Problem List   Diagnosis Code    HTN (hypertension) I10    Chronic depression F32. A    CHF (congestive heart failure) (HCC) I50.9    Thrombocytopenia (HCC) D69.6    Moderate episode of recurrent major depressive disorder (HCC) F33.1    Renal failure syndrome N19    Hyperkalemia E87.5    Isolated non-nephrotic proteinuria R80.0    ALVIN (acute kidney injury) ([facility] [address] N17.9    Chronic right-sided heart failure (HCC) I50.812    Pulmonary HTN (HCC) I27.20    Hypotension due to hypovolemia I95.89, E86.1    Acute renal failure superimposed on stage 4 chronic kidney disease (HCC) N17.9, N18.4    Pressure injury of sacral region, stage 4 (HCC) L89.154    Acute respiratory failure (HCC) J96.00    Flash pulmonary edema (HCC) J81.0    Shock (HCC) R57.9    Aspiration pneumonia of left lung (HCC) J69.0    Pleural effusion J90    Paroxysmal atrial fibrillation (HCC) I48.0    Hemoptysis R04.2    Chronic respiratory failure with hypoxia (HCC) J96.11    Pneumothorax, right J93.9    Collapse of left lung J98.11    Abnormal chest x-ray R93.89    Acute on chronic respiratory failure with hypoxia ([facility]) J96.21    Retroperitoneal hematoma K66.1    HAP (hospital-acquired pneumonia) J18.9, Y95    Colostomy in place ([facility]) Z93.3    PEG (percutaneous endoscopic gastrostomy) status ([facility]) Z93.1    Intertriginous dermatitis associated with moisture L30.4    Peristomal dermatitis associated with moisture L30.8    Nephrostomy tube displaced ([address] T83.022A       PROCEDURE NOTE     Chemical Cautery    Performed by: LANE Savage CNP    Consent obtained? Yes    Time out taken: Yes    Tissue:  [x] Hypergranulation   [] Hyperkeratotic      Pain Control:   n/a    Method: silver nitrate    Location of Chemical Cautery:   Wound/Ulcer #2 and 3     Wound 08/24/21 Sacrum (Active)   Wound Image   03/28/22 1319   Wound Etiology Pressure Stage  4 03/28/22 1319   Dressing Status Intact; Old drainage noted;New dressing applied 03/28/22 1319   Wound Cleansed Cleansed with saline 03/28/22 1319   Dressing/Treatment Moist to dry;ABD 03/28/22 1319   Offloading for Diabetic Foot Ulcers Offloading not required 03/28/22 1319   Wound Length (cm) 4 cm 03/28/22 1319   Wound Width (cm) 6 cm 03/28/22 1319   Wound Depth (cm) 3.9 cm 03/28/22 1319   Wound Surface Area (cm^2) 24 cm^2 03/28/22 1319   Change in Wound Size % (l*w) 8.57 03/28/22 1319   Wound Volume (cm^3) 93.6 cm^3 03/28/22 1319   Wound Healing % 6 03/28/22 1319   Distance Tunneling (cm) 4 cm 09/29/21 1322   Tunneling Position ___ O'Clock 12 09/29/21 1322   Undermining Starts ___ O'Clock 12 03/28/22 1319   Undermining Ends___ O'Clock 12 03/28/22 1319   Undermining Maxium Distance (cm) 3 03/28/22 1319   Wound Assessment Granulation tissue;Pale granulation tissue;Slough; Purple/maroon 03/28/22 1319   Drainage Amount Large 03/28/22 1319   Drainage Description Serosanguinous 03/28/22 1319   Odor None 03/28/22 1319   Katey-wound Assessment Maceration;Blanchable erythema;Denuded 03/28/22 1319   Margins Unattached edges 03/28/22 1319   Wound Thickness Description not for Pressure Injury Full thickness 03/28/22 1319   Number of days: 216       Wound 03/13/22 Toe (Comment  which one) Anterior; Left left second toe (Active)   Wound Image   03/28/22 1325   Wound Etiology Other 03/15/22 0823   Dressing Status Intact; Old drainage noted;New dressing applied 03/28/22 1325   Wound Cleansed Cleansed with saline 03/28/22 1325   Dressing/Treatment Alginate;Dry dressing 03/28/22 1325   Offloading for Diabetic Foot Ulcers Offloading not ordered 03/28/22 1325   Wound Length (cm) 1.8 cm 03/28/22 1325   Wound Width (cm) 2.5 cm 03/28/22 1325   Wound Depth (cm) 0 cm 03/28/22 1325   Wound Surface Area (cm^2) 4.5 cm^2 03/28/22 1325   Wound Volume (cm^3) 0 cm^3 03/28/22 1325   Wound Assessment Hyper granulation tissue 03/28/22 1325   Drainage Amount Moderate 03/28/22 1325   Drainage Description Sanguinous 03/28/22 1325   Odor None 03/28/22 1325   Katey-wound Assessment Dry/flaky; Intact 03/28/22 1325   Margins Attached edges 03/28/22 1325   Number of days: 14       Wound 03/13/22 Toe (Comment  which one) Anterior;Right right second toe (Active)   Wound Image   03/28/22 1326   Wound Etiology Other 03/15/22 0823   Dressing Status Intact; Old drainage noted;New dressing applied 03/28/22 1326   Wound Cleansed Cleansed with saline 03/28/22 1326   Dressing/Treatment Alginate;Dry dressing 03/28/22 1326   Offloading for Diabetic Foot Ulcers Offloading not ordered 03/28/22 1326   Wound Length (cm) 1 cm 03/28/22 1326   Wound Width (cm) 1 cm 03/28/22 1326   Wound Depth (cm) 0 cm 03/28/22 1326   Wound Surface Area (cm^2) 1 cm^2 03/28/22 1326   Wound Volume (cm^3) 0 cm^3 03/28/22 1326   Wound Assessment Hyper granulation tissue 03/28/22 1326   Drainage Amount Moderate 03/28/22 1326   Drainage Description Sanguinous 03/28/22 1326   Odor None 03/28/22 1326   Katey-wound Assessment Dry/flaky; Intact 03/28/22 1326   Margins Attached edges 03/28/22 1326   Number of days: 14       Procedural Pain: 0  / 10     Post Procedural Pain: 0 / 10     Response to treatment: Well tolerated by patient. PLAN     Patient examined and evaluated. All available lab work, radiology studies, and progress notes pertaining to Karen Barbosa reviewed prior to or during patient visit today.    -Stage IV pressure injury, sacrum- Wound bed granular, no indications of infection today. Continue negative pressure wound therapy to help promote healing and decrease pain with frequent dressing changes. Periwound denuded, erythematous today. Recommend use of stoma wafer to periwound skin to protect from adhesives. Patient family states that Kenzie's mobility has declined, facility has been encouraging frequent turning and repositioning. Continues on low air loss mattress. Reinforced education on the importance of offloading wound(s) for wound healing/prevention. Pressure reduction techniques reviewed. Wounds will not heal and may continue to worsen with ongoing pressure. Patient verbalized understanding.      -Open wound to right and left second toe- Wound bed very hypergranular, appears to be related to repetitive trauma or pressure. Avoid tight fitting footwear or devices that are in contact with here. Both wounds were chemically cauterized today to promote healing. Patient tolerated well.   Start use of alginate dressings, change daily.      -No indications of infection to wounds today. Education provided on signs and symptoms of infection. Call clinic or seek emergency care should these occur. Follow up 3-4 weeks. Call clinic with any needs or concerns prior to scheduled visit. All questions and concerns addressed prior to discharge from today's visit. Please see attached Discharge Instructions    Written patient dismissal instructions given to patient and signed by patient or POA.            Treatment:   Orders Placed This Encounter   Procedures    Initiate Outpatient Wound Care Protocol     Cleanse wound with saline    If wound contains bioburden or contamination cleanse with wound cleanser or antimicrobial solution     For normal periwound tissue without irritation nor maceration, apply topical skin protectant    For periwound tissue with irritation and/or maceration, apply zinc based product, topical steroid cream/ointment, or equivalent     For wounds with dry firm black eschar and/or without exudate, apply betadine and leave open to air      For wounds with scant/small to no exudate or drainage, apply wound gel, hydrocolloid, polymer, or equivalent and cover with secondary dressing/foam      For wounds with moderate/large exudate or drainage, apply alginate, hydrofiber, polymer, or equivalent and cover with secondary dressing/foam    For wounds with nonviable tissue requiring removal, apply chemical or mechanical debrider and cover with secondary dressing/foam    For wounds with tunneling, dead space, or cavity, fill or pack with strip/gauze/kerlex to fit and cover with secondary dressing/foam    For wounds with adequate granulation or epithelization, apply wound gel, hydrocolloid, polymer, collagen, or transparent film, and cover secondary dry dressing/foam    For wounds that need additional secondary dressing to help pad or control additional drainage/exudates, add foam, absorbent pad or hydrocolloid    For wounds with suspected or known infection, apply antimicrobial mesh and/or antimicrobial alginate/hydrofiber, or antimicrobial solution moistened gauze/kerlex, or equivalent and cover with secondary dressing/foam    Compression Management needed for edema control, apply multilayer compression or tubular garment or equivalent    Offloading Management needed for pressure relief, apply offloading shoe/boot or equivalent     Standing Status:   Standing     Number of Occurrences:   1       I spent a total of 38 minutes reviewing previous notes, test results and face to face with Ciriloestrada Humza  on 3/28/2022. Greater than 50% of this time was spent on counseling, reviewing and discussing test results, answering questions, instructions on treatment and/or medications ordered, and coordinating the care based on my plan and assessment as noted. Discharge Instructions         Discharge Instructions         Visit Discharge/Physician Orders:   - Please send patient with wound vac in place and we will do wound vac dressing change at visit. Please send extra canister and dressing change kit with patient. - Be sure to offload patients heels at all times. - Silver nitrate applied to bilateral second toes. May have shields drainage for a few days. This is normal.   - Turn and reposition at least every 2 hours. - Limit time up in chair to 1 hour intervals for 3 times daily maximum.   - Low airloss mattress to bed and roho cushion in chair.   - Discontinue creams to groin skin folds and under breasts. Keep areas clean and dry and apply antifungal powder daily as needed. Home Care: [facility]     Wound Location: Sacrum     Dressing orders:   1) Gather wound care supplies and arrange on clean table. 2) Wash your hands with soap and water or use alcohol based hand  for 20 seconds (sing \"Happy Birthday\" twice). 3) Cleanse wounds with normal saline or wound cleanser and gauze. Pat dry with clean gauze.    4) Sacrum- Apply skin prep to favio wound. Cut foam to fit into wound bed. Apply stoma wafer to favio wound to protect good skin. Apply foam to wound bed followed by clear drape. Cut quarter size hole in center of drape over sponge area and apply vac suction. Run wound vac at 125 mmHg continuous suction. Apply extra clear drape as needed if leaks noted. Use as minimal amount of drape as needed to protect good skin. Change canister as needed when full. Change wound vac three times weekly. 5) bilateral toes- Apply alginate to wounds. Cover with dry gauze and jennifer wrap to hold in place. Change daily. Keep all dressings clean & dry. Do not shower, take baths or get wound wet, unless otherwise instructed by your Wound Care doctor. Follow up: 4 weeks on Monday April 25th at 1:00 pm           Visit Discharge/ physician orders for Colostomy:   - Facility to order ostomy supplies as listed below. Supplies  Size  Order #    Cliff Soft Convex Drainable Pouch Cut to fit- cut to oval shape of 29/38 mm [id number]   Adapt ostomy belt Medium   23\"-43\" 7300   Adapt paste  86000   Brava Elastic Barrier Strips  [id number]   Brava Stoma Powder  [id number]   Change your flange 2 times / week and as needed if leaking   Application of one Piece Colostomy Pouch:   1. Assemble above supplies in order of application before removing pouch. 2. Cut a hole in the flange to fit the size of your stoma. Remove paper backing. 3. Remove your worn appliance by gently pulling away from skin and discard. 4. Wash skin with warm water, rinse and pat dry. DO NOT USE SOAP!  5. Inspect your skin for redness or irritation. If present, apply a small amount of powder to skin around stoma. Brush off excess powder with a Kleenex. Do not use ointments. 1. __x_ Rand EmNadia Stoma Powder (53209) (for wet, weepy skin)  6. Center the flange around your stoma. Smooth the adhesive collar to your abdomen. 7. Apply your pouch.   8. Apply barrier extenders around outer edge of flange for added security. 9. Apply belt if using. 10. Empty when 1/3 full. Follow up: 3 weeks on Monday March 28th at 1:00 pm   Keep next scheduled appointment. Please give 24 hour notice if unable to keep appointment. [phone]   If you experience any of the following, please call the Wound Care Service during business hours: Monday through Friday 8:00 am - 4:30 pm ([phone]). *Increase in pain   *Temperature over 101   *Increase in drainage from your wound or a foul odor   *Uncontrolled swelling   *Need for compression bandage changes due to slippage, breakthrough drainage   If you need medical attention outside of business hours, please contact your Primary Care Doctor or go to the nearest emergency room. What are pressure injuries? A pressure injury to the skin is caused by constant pressure over a period of time. The constant pressure blocks the blood supply to the skin. This causes skin cells to die and creates a sore. Pressure injuries are also called bedsores. Pressure injuries usually occur over bony areas, such as the hips, lower back, elbows, heels, and shoulders. Pressure injuries can also occur in places where the skin folds over on itself, or where medical equipment presses on the skin, such as when oxygen tubes press on the ears or cheeks. Pressure injuries can range from red areas on the surface of the skin to severe tissue damage that goes deep into muscle and bone. Severe sores are hard to treat and slow to heal. When pressure injuries do not heal properly, problems such as bone, blood, and skin infections can develop. What causes pressure injuries? Things that cause pressure injuries include:   Pressure on the skin and tissues. For example, the sores may happen when a person lies in bed or sits in a wheelchair for a long time. This is the most common cause of pressure injuries. Sliding down in a bed or chair, forcing the skin to fold over itself (shear force).    Being skin and reduces its blood supply. If you need help quitting, talk to your doctor about stop-smoking programs and medicines. These can increase your chances of quitting for good. Ask about using cushions or pads   Overlays are special pads you put on top of a mattress. They provide a softer surface that will fit your body's shape better than a regular mattress. Cushions or devices can be used to reduce pressure on certain areas of the body. For example, you can use a \"medical heel pillow\" to help prevent pressure injuries on heels. You can also get cushions for seating surfaces, such as wheelchair seats. Talk with your doctor about cushions and pads. Some products, such as doughnut-type devices, may actually cause pressure injuries or make them worse. When should you call for help? Call your doctor now or seek immediate medical care if:    1. You have signs of infection, such as: Increased pain, swelling, warmth, or redness. Red streaks leading from the sore. Pus draining from the sore. A fever. Watch closely for changes in your health, and be sure to contact your doctor if:    Your pressure injuries are not healing. You have new pressure injuries. You need help changing positions in bed or in a chair.     Your caregiver needs help to move you             Electronically signed by LANE Savage CNP on 3/28/2022 at 3:32 PM

## 2022-03-28 NOTE — TELEPHONE ENCOUNTER
I called and spoke to Venkatesh Gracia she said that Milagros Callahan is in the [facility].

## 2022-03-28 NOTE — PLAN OF CARE
Problem: Wound:  Goal: Skin integrity intact  Outcome: Ongoing     Patient presents to wound clinic for multiple wounds. No s/s of infection noted. See AVS for discharge instructions. Follow up: 4 weeks on Monday April 25th at 1:00 pm.    Care plan reviewed with patient. Patient verbalize understanding of the plan of care and contribute to goal setting.

## 2022-03-29 ENCOUNTER — OFFICE VISIT (OUTPATIENT)
Dept: UROLOGY | Age: 72
End: 2022-03-29
Payer: MEDICARE

## 2022-03-29 ENCOUNTER — TELEPHONE (OUTPATIENT)
Dept: UROLOGY | Age: 72
End: 2022-03-29

## 2022-03-29 VITALS
HEIGHT: 57 IN | BODY MASS INDEX: 28.91 KG/M2 | WEIGHT: 134 LBS | SYSTOLIC BLOOD PRESSURE: 125 MMHG | DIASTOLIC BLOOD PRESSURE: 65 MMHG | HEART RATE: 81 BPM

## 2022-03-29 DIAGNOSIS — N20.0 KIDNEY STONE: Primary | ICD-10-CM

## 2022-03-29 PROCEDURE — G8400 PT W/DXA NO RESULTS DOC: HCPCS | Performed by: UROLOGY

## 2022-03-29 PROCEDURE — 4040F PNEUMOC VAC/ADMIN/RCVD: CPT | Performed by: UROLOGY

## 2022-03-29 PROCEDURE — G8427 DOCREV CUR MEDS BY ELIG CLIN: HCPCS | Performed by: UROLOGY

## 2022-03-29 PROCEDURE — 3017F COLORECTAL CA SCREEN DOC REV: CPT | Performed by: UROLOGY

## 2022-03-29 PROCEDURE — 99214 OFFICE O/P EST MOD 30 MIN: CPT | Performed by: UROLOGY

## 2022-03-29 PROCEDURE — 1123F ACP DISCUSS/DSCN MKR DOCD: CPT | Performed by: UROLOGY

## 2022-03-29 PROCEDURE — G8417 CALC BMI ABV UP PARAM F/U: HCPCS | Performed by: UROLOGY

## 2022-03-29 PROCEDURE — 1111F DSCHRG MED/CURRENT MED MERGE: CPT | Performed by: UROLOGY

## 2022-03-29 PROCEDURE — 1090F PRES/ABSN URINE INCON ASSESS: CPT | Performed by: UROLOGY

## 2022-03-29 PROCEDURE — G8484 FLU IMMUNIZE NO ADMIN: HCPCS | Performed by: UROLOGY

## 2022-03-29 PROCEDURE — 1036F TOBACCO NON-USER: CPT | Performed by: UROLOGY

## 2022-03-29 RX ORDER — ALLOPURINOL 100 MG/1
100 TABLET ORAL DAILY
COMMUNITY
End: 2022-04-07

## 2022-03-29 NOTE — TELEPHONE ENCOUNTER
IR moved nephrostomy tube change out 1.5 mo from today.  They informed the patient who lives at the [place] [facility]

## 2022-03-29 NOTE — PROGRESS NOTES
[address] [place] [address]  Dept: [phone]  Dept Fax: [phone]  Loc: [facility] Urology Office Note -     Patient:  Adriane Wall  YOB: 1950    The patient is a 73 y.o. female who presents today for evaluation of the following problems:   Chief Complaint   Patient presents with    Follow-up     large staghorn calculus in left kidney,    Nephrolithiasis        History of Present Illness:    Left staghorn calculus  Has neph tube in place  Is improving clinically  No longer in [place]  Here with family  Very rough clinical course over the last few months        Requested/reviewed records from Husdon Mccurdy MD office and/or outside [de-identified]    (Patient's old records have been requested, reviewed and pertinent findings summarized in today's note.)    Procedures Today: N/A    Last several PSA's:  No results found for: PSA    Last total testosterone:  No results found for: TESTOSTERONE    Urinalysis today:  No results found for this visit on 03/29/22. Last BUN and creatinine:  Lab Results   Component Value Date    BUN 79 03/24/2022     Lab Results   Component Value Date    CREATININE 1.4 03/24/2022       Imaging Reviewed during this Office Visit:   Neetu Levy MD independently reviewed the images and verified the radiology reports from:    No results found.     PAST MEDICAL, FAMILY AND SOCIAL HISTORY:  Past Medical History:   Diagnosis Date    CHF [facility]congestive heart failure) ([facility])     Dr. Parish Cortes Depression     Gout attack     Hypertension     Osteoarthritis     Pulmonary artery hypertension ([address]     [facility]-- Dr. Nadja Terry-- Dr. Parish Cortes Renal calculi     Dr. Adeline Rose [facility])      Past Surgical History:   Procedure Laterality Date    APPENDECTOMY  1960    BRONCHOSCOPY N/A 11/22/2021    BRONCHOSCOPY performed by Cheri Alvares MD at [address] Endoscopy    BRONCHOSCOPY N/A 11/30/2021    BRONCHOSCOPY WITHOUT FLURO performed by Yolis Mireles MD at [address] N/A 1/27/2022    LAPAROSCOPY WITH DIVERTING LOOP COLOSTOMY performed by Matthew Ferrer MD at [facility] N/A 11/24/2021    EGD PEG TUBE PLACEMENT performed by Xiomara Flannery MD at [address] [facility] Endoscopy    IR [address]  11/26/2021    IR CHEST TUBE INSERTION 11/26/2021 [facility]    IR NEPHROSTOMY PERCUTANEOUS LEFT  11/1/2021    IR NEPHROSTOMY PERCUTANEOUS LEFT 11/1/2021 Chintan Collazo MD [facility]    IR NEPHROSTOMY PERCUTANEOUS LEFT  3/14/2022    IR NEPHROSTOMY PERCUTANEOUS LEFT 3/14/2022 Guanaco Hernandez MD [facility]    PRESSURE ULCER DEBRIDEMENT Right 8/6/2021    EXCISION RIGHT BUTTOCK WOUND AND CLOSURE performed by Jean-Claude Landis MD at [address] [place] [address] History   Problem Relation Age of Onset    Diabetes Father    Vonnie.Galdeanna Arthritis Mother    Vonnie.Gale COPD Mother     Diabetes Sister     Heart Disease Maternal Uncle     Breast Cancer Niece 36    Sleep Apnea Brother     Asthma Neg Hx     Birth Defects Neg Hx     Cancer Neg Hx     Depression Neg Hx     Early Death Neg Hx     Hearing Loss Neg Hx     High Blood Pressure Neg Hx     High Cholesterol Neg Hx     Kidney Disease Neg Hx     Learning Disabilities Neg Hx     Mental Illness Neg Hx     Mental Retardation Neg Hx     Miscarriages / Stillbirths Neg Hx     Stroke Neg Hx     Substance Abuse Neg Hx     Vision Loss Neg Hx     Other Neg Hx      Outpatient Medications Marked as Taking for the 3/29/22 encounter (Office Visit) with Gay Shin MD   Medication Sig Dispense Refill    sodium chloride 0.9 % SOLN Inject as directed Flush TID nephrostomy tube 10ml.  miconazole (MICOTIN) 2 % powder Apply topically daily as needed for Itching Apply topically 2 times daily.       silver nitrate applicators 88-27 % applicator Apply 1 each topically Once a week on Friday and PRN      allopurinol (ZYLOPRIM) 100 MG tablet Take 100 mg by mouth daily      folic acid (FOLVITE) 1 MG tablet Take 1 tablet by mouth daily 30 tablet 3    ferrous sulfate (IRON 325) 325 (65 Fe) MG tablet Take 1 tablet by mouth every other day 30 tablet 1    melatonin 3 MG TABS tablet Take 2 tablets by mouth nightly as needed (anxiety, sleep) 30 tablet 1    famotidine (PEPCID) 20 MG tablet 20 mg by PEG Tube route daily      hydrOXYzine (ATARAX) 25 MG tablet 25 mg by PEG Tube route every 8 hours as needed for Anxiety      metoclopramide (REGLAN) 5 MG/5ML solution 5 mg by PEG Tube route every 8 hours      senna (SENOKOT) 8.8 MG/5ML SYRP syrup Take 5 mLs by mouth nightly as needed      metoprolol tartrate (LOPRESSOR) 25 MG tablet Take 0.5 tablets by mouth 2 times daily 90 tablet 1    sodium hypochlorite (DAKINS) 0.125 % SOLN external solution Apply Dakin's moistened gauze dressings to wound twice daily and as needed. 1 Bottle 2    sodium chloride 1 g tablet Take 1 tablet by mouth 2 times daily 240 tablet 3    FLUoxetine (PROZAC) 40 MG capsule Take 1 capsule by mouth daily 90 capsule 3    potassium chloride (KLOR-CON M) 10 MEQ extended release tablet Take 1 tablet by mouth every other day 90 tablet 3    bumetanide (BUMEX) 1 MG tablet Take 1 tablet by mouth daily (Patient taking differently: Take 0.5 mg by mouth daily ) 60 tablet 11    Multiple Vitamins-Minerals (MULTIVITAMIN WOMEN PO) Take 1 tablet by mouth daily      acetaminophen (TYLENOL) 650 MG extended release tablet Take 650 mg by mouth every 8 hours as needed for Pain      docusate sodium (COLACE) 100 MG capsule Take 100 mg by mouth as needed       aspirin 81 MG tablet Take 81 mg by mouth daily          Patient has no known allergies.   Social History     Tobacco Use   Smoking Status Never Smoker   Smokeless Tobacco Never Used      (If patient a smoker, smoking cessation counseling offered)   Social History     Substance and Sexual Activity   Alcohol Use No       REVIEW OF SYSTEMS:  Constitutional: negative  Eyes: negative  Respiratory: negative  Cardiovascular: negative  Gastrointestinal: negative  Genitourinary: see HPI  Musculoskeletal: negative  Skin: negative   Neurological: negative  Hematological/Lymphatic: negative  Psychological: negative      Physical Exam:    This a 73 y.o. female  Vitals:    03/29/22 1127   BP: 125/65   Pulse: 81     Body mass index is 29 kg/m². Constitutional: Patient in no acute distress;   neph tube draining    Assessment and Plan        1. Kidney stone               Plan: Will need L PCNL as outpatient. Will preadmit for abx. Will be difficult course for patient  Will need neph tube replaced in 1.5 months  Will reassess in 2 months. Would like to see albert continue her improvement clinically before we perform surgery. Family and patient are agreeable. Prescriptions Ordered:  No orders of the defined types were placed in this encounter. Orders Placed:  No orders of the defined types were placed in this encounter.            Bhavin Leger MD

## 2022-04-05 LAB
BASOPHILS ABSOLUTE: ABNORMAL
BASOPHILS RELATIVE PERCENT: ABNORMAL
BUN BLDV-MCNC: 112 MG/DL
CALCIUM SERPL-MCNC: 7.9 MG/DL
CHLORIDE BLD-SCNC: 99 MMOL/L
CO2: 19 MMOL/L
CREAT SERPL-MCNC: 3.4 MG/DL
EOSINOPHILS ABSOLUTE: ABNORMAL
EOSINOPHILS RELATIVE PERCENT: ABNORMAL
GFR CALCULATED: 16
GLUCOSE BLD-MCNC: 82 MG/DL
HCT VFR BLD CALC: 23.8 % (ref 36–46)
HEMOGLOBIN: 7.7 G/DL (ref 12–16)
LYMPHOCYTES ABSOLUTE: ABNORMAL
LYMPHOCYTES RELATIVE PERCENT: ABNORMAL
MCH RBC QN AUTO: ABNORMAL PG
MCHC RBC AUTO-ENTMCNC: ABNORMAL G/DL
MCV RBC AUTO: ABNORMAL FL
MONOCYTES ABSOLUTE: ABNORMAL
MONOCYTES RELATIVE PERCENT: ABNORMAL
NEUTROPHILS ABSOLUTE: ABNORMAL
NEUTROPHILS RELATIVE PERCENT: ABNORMAL
PLATELET # BLD: 307 K/ΜL
PMV BLD AUTO: ABNORMAL FL
POTASSIUM SERPL-SCNC: 5.2 MMOL/L
RBC # BLD: 2.67 10^6/ΜL
SODIUM BLD-SCNC: 132 MMOL/L
WBC # BLD: 8.2 10^3/ML

## 2022-04-07 ENCOUNTER — TELEPHONE (OUTPATIENT)
Dept: NEPHROLOGY | Age: 72
End: 2022-04-07

## 2022-04-07 RX ORDER — METOCLOPRAMIDE 5 MG/1
5 TABLET ORAL EVERY 8 HOURS
Status: ON HOLD | COMMUNITY
End: 2022-04-26 | Stop reason: HOSPADM

## 2022-04-07 RX ORDER — BUMETANIDE 0.5 MG/1
0.5 TABLET ORAL DAILY
Status: ON HOLD | COMMUNITY
End: 2022-04-26 | Stop reason: HOSPADM

## 2022-04-07 NOTE — TELEPHONE ENCOUNTER
Received blood work, medication list and letter wanting you to address pt's increasing BUN and creatinine. What do you want to do?

## 2022-04-07 NOTE — TELEPHONE ENCOUNTER
I called and informed Cristal of this information. I also faxed this note to her at [phone]. She states it will be Tuesday before they can do lab draw.

## 2022-04-07 NOTE — TELEPHONE ENCOUNTER
Please check with the patient how she is overall feeling and if she has any edema  Stop the Bumex and any potassium if she may be taking  Start normal saline at 80 mL/h for 48 hours if possible  Remove any fluid restriction  BMP on Monday

## 2022-04-11 ENCOUNTER — HOSPITAL ENCOUNTER (INPATIENT)
Age: 72
LOS: 16 days | Discharge: NURSING, REHAB OR LONG-TERM CARE | DRG: 673 | End: 2022-04-28
Attending: EMERGENCY MEDICINE | Admitting: HOSPITALIST
Payer: MEDICARE

## 2022-04-11 DIAGNOSIS — N18.9 ACUTE RENAL FAILURE SUPERIMPOSED ON CHRONIC KIDNEY DISEASE, UNSPECIFIED CKD STAGE, UNSPECIFIED ACUTE RENAL FAILURE TYPE (HCC): Primary | ICD-10-CM

## 2022-04-11 DIAGNOSIS — N17.9 ACUTE RENAL FAILURE SUPERIMPOSED ON CHRONIC KIDNEY DISEASE, UNSPECIFIED CKD STAGE, UNSPECIFIED ACUTE RENAL FAILURE TYPE (HCC): Primary | ICD-10-CM

## 2022-04-11 LAB
ANION GAP SERPL CALCULATED.3IONS-SCNC: 17 MEQ/L (ref 8–16)
BASOPHILS # BLD: 0.4 %
BASOPHILS ABSOLUTE: 0 THOU/MM3 (ref 0–0.1)
BUN BLDV-MCNC: 159 MG/DL (ref 7–22)
CALCIUM SERPL-MCNC: 9.2 MG/DL (ref 8.5–10.5)
CHLORIDE BLD-SCNC: 99 MEQ/L (ref 98–111)
CO2: 18 MEQ/L (ref 23–33)
CREAT SERPL-MCNC: 4.9 MG/DL (ref 0.4–1.2)
EOSINOPHIL # BLD: 2.2 %
EOSINOPHILS ABSOLUTE: 0.2 THOU/MM3 (ref 0–0.4)
ERYTHROCYTE [DISTWIDTH] IN BLOOD BY AUTOMATED COUNT: 16.4 % (ref 11.5–14.5)
ERYTHROCYTE [DISTWIDTH] IN BLOOD BY AUTOMATED COUNT: 56.9 FL (ref 35–45)
GFR SERPL CREATININE-BSD FRML MDRD: 9 ML/MIN/1.73M2
GLUCOSE BLD-MCNC: 128 MG/DL (ref 70–108)
HCT VFR BLD CALC: 25.2 % (ref 37–47)
HEMOGLOBIN: 7.5 GM/DL (ref 12–16)
IMMATURE GRANS (ABS): 0.07 THOU/MM3 (ref 0–0.07)
IMMATURE GRANULOCYTES: 0.7 %
LYMPHOCYTES # BLD: 7.4 %
LYMPHOCYTES ABSOLUTE: 0.8 THOU/MM3 (ref 1–4.8)
MCH RBC QN AUTO: 28.1 PG (ref 26–33)
MCHC RBC AUTO-ENTMCNC: 29.8 GM/DL (ref 32.2–35.5)
MCV RBC AUTO: 94.4 FL (ref 81–99)
MONOCYTES # BLD: 8.2 %
MONOCYTES ABSOLUTE: 0.9 THOU/MM3 (ref 0.4–1.3)
NUCLEATED RED BLOOD CELLS: 0 /100 WBC
OSMOLALITY CALCULATION: 322.1 MOSMOL/KG (ref 275–300)
PLATELET # BLD: 276 THOU/MM3 (ref 130–400)
PMV BLD AUTO: 8.5 FL (ref 9.4–12.4)
POTASSIUM SERPL-SCNC: 5.4 MEQ/L (ref 3.5–5.2)
RBC # BLD: 2.67 MILL/MM3 (ref 4.2–5.4)
SEG NEUTROPHILS: 81.1 %
SEGMENTED NEUTROPHILS ABSOLUTE COUNT: 8.7 THOU/MM3 (ref 1.8–7.7)
SODIUM BLD-SCNC: 134 MEQ/L (ref 135–145)
TROPONIN T: 0.22 NG/ML
WBC # BLD: 10.7 THOU/MM3 (ref 4.8–10.8)

## 2022-04-11 PROCEDURE — 80053 COMPREHEN METABOLIC PANEL: CPT

## 2022-04-11 PROCEDURE — 82248 BILIRUBIN DIRECT: CPT

## 2022-04-11 PROCEDURE — 36415 COLL VENOUS BLD VENIPUNCTURE: CPT

## 2022-04-11 PROCEDURE — 93005 ELECTROCARDIOGRAM TRACING: CPT | Performed by: EMERGENCY MEDICINE

## 2022-04-11 PROCEDURE — 85025 COMPLETE CBC W/AUTO DIFF WBC: CPT

## 2022-04-11 PROCEDURE — 99285 EMERGENCY DEPT VISIT HI MDM: CPT

## 2022-04-11 PROCEDURE — 84484 ASSAY OF TROPONIN QUANT: CPT

## 2022-04-12 ENCOUNTER — APPOINTMENT (OUTPATIENT)
Dept: CT IMAGING | Age: 72
DRG: 673 | End: 2022-04-12
Payer: MEDICARE

## 2022-04-12 ENCOUNTER — APPOINTMENT (OUTPATIENT)
Dept: ULTRASOUND IMAGING | Age: 72
DRG: 673 | End: 2022-04-12
Payer: MEDICARE

## 2022-04-12 LAB
ABO CHECK: NORMAL
ALBUMIN SERPL-MCNC: 2.4 G/DL (ref 3.5–5.1)
ALBUMIN SERPL-MCNC: 2.5 G/DL (ref 3.5–5.1)
ALP BLD-CCNC: 75 U/L (ref 38–126)
ALP BLD-CCNC: 79 U/L (ref 38–126)
ALT SERPL-CCNC: 20 U/L (ref 11–66)
ALT SERPL-CCNC: 25 U/L (ref 11–66)
AMORPHOUS: ABNORMAL
ANION GAP SERPL CALCULATED.3IONS-SCNC: 17 MEQ/L (ref 8–16)
AST SERPL-CCNC: 19 U/L (ref 5–40)
AST SERPL-CCNC: 25 U/L (ref 5–40)
BACTERIA: ABNORMAL /HPF
BILIRUB SERPL-MCNC: < 0.2 MG/DL (ref 0.3–1.2)
BILIRUB SERPL-MCNC: < 0.2 MG/DL (ref 0.3–1.2)
BILIRUBIN DIRECT: < 0.2 MG/DL (ref 0–0.3)
BILIRUBIN URINE: NEGATIVE
BLOOD, URINE: ABNORMAL
BUN BLDV-MCNC: 160 MG/DL (ref 7–22)
CALCIUM SERPL-MCNC: 8.8 MG/DL (ref 8.5–10.5)
CASTS UA: ABNORMAL /LPF
CHARACTER, URINE: ABNORMAL
CHLORIDE BLD-SCNC: 102 MEQ/L (ref 98–111)
CO2: 17 MEQ/L (ref 23–33)
COLOR: ABNORMAL
CREAT SERPL-MCNC: 4.9 MG/DL (ref 0.4–1.2)
CREATININE URINE: 29.1 MG/DL
CRYSTALS, UA: ABNORMAL
EKG ATRIAL RATE: 87 BPM
EKG P AXIS: 51 DEGREES
EKG P-R INTERVAL: 174 MS
EKG Q-T INTERVAL: 408 MS
EKG QRS DURATION: 88 MS
EKG QTC CALCULATION (BAZETT): 490 MS
EKG R AXIS: 84 DEGREES
EKG T AXIS: 65 DEGREES
EKG VENTRICULAR RATE: 87 BPM
EPITHELIAL CELLS, UA: ABNORMAL /HPF
GFR SERPL CREATININE-BSD FRML MDRD: 9 ML/MIN/1.73M2
GLUCOSE BLD-MCNC: 89 MG/DL (ref 70–108)
GLUCOSE URINE: NEGATIVE MG/DL
HCT VFR BLD CALC: 23.1 % (ref 37–47)
HCT VFR BLD CALC: 25 % (ref 37–47)
HCT VFR BLD CALC: 27.4 % (ref 37–47)
HCT VFR BLD CALC: 28.1 % (ref 37–47)
HEMOGLOBIN: 6.8 GM/DL (ref 12–16)
HEMOGLOBIN: 7.2 GM/DL (ref 12–16)
HEMOGLOBIN: 8.2 GM/DL (ref 12–16)
HEMOGLOBIN: 8.3 GM/DL (ref 12–16)
KETONES, URINE: NEGATIVE
LEUKOCYTE ESTERASE, URINE: ABNORMAL
NITRITE, URINE: NEGATIVE
PH UA: 6.5 (ref 5–9)
POTASSIUM SERPL-SCNC: 5.3 MEQ/L (ref 3.5–5.2)
POTASSIUM SERPL-SCNC: 5.4 MEQ/L (ref 3.5–5.2)
PROTEIN UA: 100
RBC URINE: > 200 /HPF
RENAL EPITHELIAL, UA: ABNORMAL
RH FACTOR: NORMAL
SELECTED GEL ANTIBODY SCREEN: NORMAL
SODIUM BLD-SCNC: 136 MEQ/L (ref 135–145)
SODIUM URINE: 62 MEQ/L
SPECIFIC GRAVITY, URINE: 1.01 (ref 1–1.03)
TOTAL PROTEIN: 6.7 G/DL (ref 6.1–8)
TOTAL PROTEIN: 7 G/DL (ref 6.1–8)
TROPONIN T: 0.23 NG/ML
UROBILINOGEN, URINE: 0.2 EU/DL (ref 0–1)
WBC UA: ABNORMAL /HPF
YEAST: ABNORMAL

## 2022-04-12 PROCEDURE — 87086 URINE CULTURE/COLONY COUNT: CPT

## 2022-04-12 PROCEDURE — 36415 COLL VENOUS BLD VENIPUNCTURE: CPT

## 2022-04-12 PROCEDURE — 80053 COMPREHEN METABOLIC PANEL: CPT

## 2022-04-12 PROCEDURE — 99232 SBSQ HOSP IP/OBS MODERATE 35: CPT | Performed by: PHYSICIAN ASSISTANT

## 2022-04-12 PROCEDURE — 87186 SC STD MICRODIL/AGAR DIL: CPT

## 2022-04-12 PROCEDURE — 82570 ASSAY OF URINE CREATININE: CPT

## 2022-04-12 PROCEDURE — 85014 HEMATOCRIT: CPT

## 2022-04-12 PROCEDURE — 86885 COOMBS TEST INDIRECT QUAL: CPT

## 2022-04-12 PROCEDURE — 76770 US EXAM ABDO BACK WALL COMP: CPT

## 2022-04-12 PROCEDURE — 84484 ASSAY OF TROPONIN QUANT: CPT

## 2022-04-12 PROCEDURE — 84300 ASSAY OF URINE SODIUM: CPT

## 2022-04-12 PROCEDURE — 6370000000 HC RX 637 (ALT 250 FOR IP): Performed by: INTERNAL MEDICINE

## 2022-04-12 PROCEDURE — 86905 BLOOD TYPING RBC ANTIGENS: CPT

## 2022-04-12 PROCEDURE — 85018 HEMOGLOBIN: CPT

## 2022-04-12 PROCEDURE — 87077 CULTURE AEROBIC IDENTIFY: CPT

## 2022-04-12 PROCEDURE — 84132 ASSAY OF SERUM POTASSIUM: CPT

## 2022-04-12 PROCEDURE — 51798 US URINE CAPACITY MEASURE: CPT

## 2022-04-12 PROCEDURE — 81001 URINALYSIS AUTO W/SCOPE: CPT

## 2022-04-12 PROCEDURE — 86900 BLOOD TYPING SEROLOGIC ABO: CPT

## 2022-04-12 PROCEDURE — 2580000003 HC RX 258: Performed by: STUDENT IN AN ORGANIZED HEALTH CARE EDUCATION/TRAINING PROGRAM

## 2022-04-12 PROCEDURE — 86901 BLOOD TYPING SEROLOGIC RH(D): CPT

## 2022-04-12 PROCEDURE — 86922 COMPATIBILITY TEST ANTIGLOB: CPT

## 2022-04-12 PROCEDURE — 1200000000 HC SEMI PRIVATE

## 2022-04-12 PROCEDURE — 51702 INSERT TEMP BLADDER CATH: CPT

## 2022-04-12 PROCEDURE — 99223 1ST HOSP IP/OBS HIGH 75: CPT | Performed by: INTERNAL MEDICINE

## 2022-04-12 PROCEDURE — 74176 CT ABD & PELVIS W/O CONTRAST: CPT

## 2022-04-12 PROCEDURE — 6370000000 HC RX 637 (ALT 250 FOR IP): Performed by: STUDENT IN AN ORGANIZED HEALTH CARE EDUCATION/TRAINING PROGRAM

## 2022-04-12 PROCEDURE — P9016 RBC LEUKOCYTES REDUCED: HCPCS

## 2022-04-12 PROCEDURE — 93010 ELECTROCARDIOGRAM REPORT: CPT | Performed by: INTERNAL MEDICINE

## 2022-04-12 PROCEDURE — 36430 TRANSFUSION BLD/BLD COMPNT: CPT

## 2022-04-12 RX ORDER — ONDANSETRON 4 MG/1
4 TABLET, ORALLY DISINTEGRATING ORAL EVERY 8 HOURS PRN
Status: DISCONTINUED | OUTPATIENT
Start: 2022-04-12 | End: 2022-04-12

## 2022-04-12 RX ORDER — FERROUS SULFATE 325(65) MG
325 TABLET ORAL EVERY OTHER DAY
Status: DISCONTINUED | OUTPATIENT
Start: 2022-04-12 | End: 2022-04-28 | Stop reason: HOSPADM

## 2022-04-12 RX ORDER — BUMETANIDE 0.5 MG/1
0.5 TABLET ORAL DAILY
Status: DISCONTINUED | OUTPATIENT
Start: 2022-04-12 | End: 2022-04-28 | Stop reason: HOSPADM

## 2022-04-12 RX ORDER — FLUOXETINE HYDROCHLORIDE 20 MG/1
40 CAPSULE ORAL DAILY
Status: DISCONTINUED | OUTPATIENT
Start: 2022-04-12 | End: 2022-04-28 | Stop reason: HOSPADM

## 2022-04-12 RX ORDER — SODIUM CHLORIDE 9 MG/ML
INJECTION, SOLUTION INTRAVENOUS PRN
Status: DISCONTINUED | OUTPATIENT
Start: 2022-04-12 | End: 2022-04-28 | Stop reason: HOSPADM

## 2022-04-12 RX ORDER — ACETAMINOPHEN 650 MG/1
650 SUPPOSITORY RECTAL EVERY 6 HOURS PRN
Status: DISCONTINUED | OUTPATIENT
Start: 2022-04-12 | End: 2022-04-28 | Stop reason: HOSPADM

## 2022-04-12 RX ORDER — ACETAMINOPHEN 650 MG/1
650 SUPPOSITORY RECTAL EVERY 6 HOURS PRN
Status: DISCONTINUED | OUTPATIENT
Start: 2022-04-12 | End: 2022-04-12 | Stop reason: SDUPTHER

## 2022-04-12 RX ORDER — SODIUM CHLORIDE 9 MG/ML
INJECTION, SOLUTION INTRAVENOUS PRN
Status: DISCONTINUED | OUTPATIENT
Start: 2022-04-12 | End: 2022-04-12

## 2022-04-12 RX ORDER — ASPIRIN 81 MG/1
81 TABLET, CHEWABLE ORAL DAILY
Status: DISCONTINUED | OUTPATIENT
Start: 2022-04-12 | End: 2022-04-28 | Stop reason: HOSPADM

## 2022-04-12 RX ORDER — SODIUM CHLORIDE 9 MG/ML
INJECTION, SOLUTION INTRAVENOUS PRN
Status: CANCELLED | OUTPATIENT
Start: 2022-04-12

## 2022-04-12 RX ORDER — SODIUM CHLORIDE 9 MG/ML
INJECTION, SOLUTION INTRAVENOUS CONTINUOUS
Status: DISCONTINUED | OUTPATIENT
Start: 2022-04-12 | End: 2022-04-12

## 2022-04-12 RX ORDER — ONDANSETRON 2 MG/ML
4 INJECTION INTRAMUSCULAR; INTRAVENOUS EVERY 6 HOURS PRN
Status: DISCONTINUED | OUTPATIENT
Start: 2022-04-12 | End: 2022-04-12

## 2022-04-12 RX ORDER — ACETAMINOPHEN 325 MG/1
650 TABLET ORAL EVERY 6 HOURS PRN
Status: DISCONTINUED | OUTPATIENT
Start: 2022-04-12 | End: 2022-04-12 | Stop reason: SDUPTHER

## 2022-04-12 RX ORDER — ACETAMINOPHEN 325 MG/1
650 TABLET ORAL EVERY 6 HOURS PRN
Status: DISCONTINUED | OUTPATIENT
Start: 2022-04-12 | End: 2022-04-28 | Stop reason: HOSPADM

## 2022-04-12 RX ORDER — FAMOTIDINE 20 MG/1
20 TABLET, FILM COATED ORAL EVERY OTHER DAY
Status: DISCONTINUED | OUTPATIENT
Start: 2022-04-13 | End: 2022-04-28 | Stop reason: HOSPADM

## 2022-04-12 RX ORDER — LANOLIN ALCOHOL/MO/W.PET/CERES
6 CREAM (GRAM) TOPICAL NIGHTLY PRN
Status: DISCONTINUED | OUTPATIENT
Start: 2022-04-12 | End: 2022-04-28 | Stop reason: HOSPADM

## 2022-04-12 RX ORDER — SODIUM CHLORIDE 0.9 % (FLUSH) 0.9 %
5-40 SYRINGE (ML) INJECTION PRN
Status: DISCONTINUED | OUTPATIENT
Start: 2022-04-12 | End: 2022-04-12

## 2022-04-12 RX ORDER — SODIUM CHLORIDE 0.9 % (FLUSH) 0.9 %
5-40 SYRINGE (ML) INJECTION EVERY 12 HOURS SCHEDULED
Status: DISCONTINUED | OUTPATIENT
Start: 2022-04-12 | End: 2022-04-28 | Stop reason: HOSPADM

## 2022-04-12 RX ORDER — SODIUM CHLORIDE 1000 MG
1 TABLET, SOLUBLE MISCELLANEOUS 2 TIMES DAILY
Status: DISCONTINUED | OUTPATIENT
Start: 2022-04-12 | End: 2022-04-12

## 2022-04-12 RX ORDER — SODIUM CHLORIDE 0.9 % (FLUSH) 0.9 %
5-40 SYRINGE (ML) INJECTION EVERY 12 HOURS SCHEDULED
Status: DISCONTINUED | OUTPATIENT
Start: 2022-04-12 | End: 2022-04-12

## 2022-04-12 RX ORDER — SODIUM CHLORIDE 0.9 % (FLUSH) 0.9 %
5-40 SYRINGE (ML) INJECTION PRN
Status: DISCONTINUED | OUTPATIENT
Start: 2022-04-12 | End: 2022-04-28 | Stop reason: HOSPADM

## 2022-04-12 RX ADMIN — METOPROLOL TARTRATE 12.5 MG: 25 TABLET, FILM COATED ORAL at 21:24

## 2022-04-12 RX ADMIN — FERROUS SULFATE TAB 325 MG (65 MG ELEMENTAL FE) 325 MG: 325 (65 FE) TAB at 13:04

## 2022-04-12 RX ADMIN — FLUOXETINE 40 MG: 20 CAPSULE ORAL at 13:04

## 2022-04-12 RX ADMIN — SODIUM ZIRCONIUM CYCLOSILICATE 10 G: 5 POWDER, FOR SUSPENSION ORAL at 13:03

## 2022-04-12 RX ADMIN — SODIUM CHLORIDE: 9 INJECTION, SOLUTION INTRAVENOUS at 05:28

## 2022-04-12 RX ADMIN — METOPROLOL TARTRATE 12.5 MG: 25 TABLET, FILM COATED ORAL at 13:03

## 2022-04-12 RX ADMIN — SODIUM CHLORIDE, PRESERVATIVE FREE 10 ML: 5 INJECTION INTRAVENOUS at 21:23

## 2022-04-12 RX ADMIN — SODIUM CHLORIDE: 9 INJECTION, SOLUTION INTRAVENOUS at 14:03

## 2022-04-12 ASSESSMENT — PAIN SCALES - GENERAL
PAINLEVEL_OUTOF10: 0

## 2022-04-12 NOTE — ED NOTES
Pt was sating at 88% on room air, RN placed pt on 2 L NC and pt is now sating at 93%. Pt is resting in cot with RR even and unlabored with family at bedside. Pt voices no concern or need at this time. Call light is within reach. Will continue to monitor.       Jorge Romero RN  04/11/22 [id number] [address], RN  04/11/22 6264

## 2022-04-12 NOTE — CONSULTS
[facility]          [facility]        [address]        [place] [facility]        [phone]           Inpatient Initial consult note         4/12/2022 8:58 AM    Patient Name:   Atilano Bumpers  YOB: 1950  Primary Care Physician:  Meek Rhodes MD     History Obtained From:  electronic medical record     Consultation requested by : Miky Soler PA-C    requested for  : Evaluation of worsening renal function     History of presentingillness   Atilano Bumpers is a 73 y.o.   female with Past Medical History as stated below presented with a chief complaint of Abnormal Lab   on 4/11/2022 . Patient is a poor historian and accurate history and review of systems cannot be obtained from the patient    Patient presented with chief complaints of abnormal labs, and also was found to have bright red blood in the nephrostomy tube for the last 3 days. So she was transferred to [place] [address] for further evaluation and management. Patient has some labs drawn last week, which showed a creatinine of 3.4 and a BUN of 112 at which point her diuretics were held and she was started on IV fluids but it does not appear to be having much improvement. Patient has history of obstructive staghorn calculus and has been on chronic nephrostomy tube. Patient is awake and alert but unable to give any significant history. Yesterday the labs showed the patient's creatinine was 4.9 BUN of 159.   No new labs available today     Past History      Past Medical History:   Diagnosis Date    CHF [facility]congestive heart failure) ([facility])     Dr. Eros King Depression     Gout attack     Hypertension     Osteoarthritis     Pulmonary artery hypertension ([facility]     [facility]-- Dr. Bhakti Gambino-- Dr. Eros King Renal calculi     Dr. Karen Robertson Thrombocytopenia [facility])      Past Surgical History:   Procedure Laterality Date   [address]    BRONCHOSCOPY N/A 11/22/2021    BRONCHOSCOPY performed by Jarvis Steiner Khanh Veras MD at [address] N/A 11/30/2021    BRONCHOSCOPY WITHOUT FLURO performed by Robbin Brenner MD at [address] N/A 1/27/2022    LAPAROSCOPY WITH DIVERTING LOOP COLOSTOMY performed by Louis Gallagher MD at [facility] N/A 11/24/2021    EGD PEG TUBE PLACEMENT performed by Maegan Remy MD at [facility] Endoscopy    IR [facility]  11/26/2021    IR CHEST TUBE INSERTION 11/26/2021 [facility]    IR NEPHROSTOMY PERCUTANEOUS LEFT  11/1/2021    IR NEPHROSTOMY PERCUTANEOUS LEFT 11/1/2021 Glenn Gay MD [facility]    IR NEPHROSTOMY PERCUTANEOUS LEFT  3/14/2022    IR NEPHROSTOMY PERCUTANEOUS LEFT 3/14/2022 Hiram Gonzalez MD [facility]    PRESSURE ULCER DEBRIDEMENT Right 8/6/2021    EXCISION RIGHT BUTTOCK WOUND AND CLOSURE performed by Mitali Mckenzie MD at [address] [facility] History     Socioeconomic History    Marital status: Single     Spouse name: Not on file    Number of children: 0    Years of education: Not on file    Highest education level: Not on file   Occupational History    Not on file   Tobacco Use    Smoking status: Never Smoker    Smokeless tobacco: Never Used   Vaping Use    Vaping Use: Never used   Substance and Sexual Activity    Alcohol use: No    Drug use: No    Sexual activity: Not Currently   Other Topics Concern    Not on file   Social History Narrative    Not on file     Social Determinants of Health     Financial Resource Strain: Low Risk     Difficulty of Paying Living Expenses: Not very hard   Food Insecurity: No Food Insecurity    Worried About Running Out of Food in the Last Year: Never true    Traci of Food in the Last Year: Never true   Transportation Needs:     Lack of Transportation (Medical): Not on file    Lack of Transportation (Non-Medical):  Not on file   Physical Activity:     Days of Exercise per Week: Not on file    Minutes of Exercise per Session: Not on file   Stress:     Feeling of Stress : Not on file   Social Connections:     Frequency of Communication with Friends and Family: Not on file    Frequency of Social Gatherings with Friends and Family: Not on file    Attends Confucianism Services: Not on file    Active Member of Clubs or Organizations: Not on file    Attends Club or Organization Meetings: Not on file    Marital Status: Not on file   Intimate Partner Violence:     Fear of Current or Ex-Partner: Not on file    Emotionally Abused: Not on file    Physically Abused: Not on file    Sexually Abused: Not on file   Housing Stability:     Unable to Pay for Housing in the Last Year: Not on file    Number of Places Lived in the Last Year: Not on file    Unstable Housing in the Last Year: Not on file     Family History   Problem Relation Age of Onset    Diabetes Father     Arthritis Mother     COPD Mother     Diabetes Sister     Heart Disease Maternal Uncle     Breast Cancer Niece 36    Sleep Apnea Brother     Asthma Neg Hx     Birth Defects Neg Hx     Cancer Neg Hx     Depression Neg Hx     Early Death Neg Hx     Hearing Loss Neg Hx     High Blood Pressure Neg Hx     High Cholesterol Neg Hx     Kidney Disease Neg Hx     Learning Disabilities Neg Hx     Mental Illness Neg Hx     Mental Retardation Neg Hx     Miscarriages / Stillbirths Neg Hx     Stroke Neg Hx     Substance Abuse Neg Hx     Vision Loss Neg Hx     Other Neg Hx      Medications & Allergies      Prior to Admission medications    Medication Sig Start Date End Date Taking? Authorizing Provider   bumetanide (BUMEX) 0.5 MG tablet Take 0.5 mg by mouth daily    Historical Provider, MD   metoclopramide (REGLAN) 5 MG tablet Take 5 mg by mouth every 8 hours    Historical Provider, MD   sodium chloride 0.9 % SOLN Inject as directed Flush TID nephrostomy tube 10ml.     Historical Provider, MD   miconazole (MICOTIN) 2 % powder Apply topically daily as needed for Itching Apply topically 2 times daily. Historical Provider, MD   silver nitrate applicators 70-93 % applicator Apply 1 each topically Once a week on Friday and PRN to prowed flesh at peg site    Historical Provider, MD   folic acid (FOLVITE) 1 MG tablet Take 1 tablet by mouth daily 2/4/22   Temitope Brunson,    ferrous sulfate (IRON 325) 325 (65 Fe) MG tablet Take 1 tablet by mouth every other day  Patient taking differently: Take 325 mg by mouth daily (with breakfast)  2/4/22   Cherelle Maguire, DO   melatonin 3 MG TABS tablet Take 2 tablets by mouth nightly as needed (anxiety, sleep) 2/4/22   Cherelle Maguire, DO   famotidine (PEPCID) 20 MG tablet 20 mg by PEG Tube route daily    Historical Provider, MD   senna (SENOKOT) 8.8 MG/5ML SYRP syrup Take 5 mLs by mouth nightly as needed    Historical Provider, MD   metoprolol tartrate (LOPRESSOR) 25 MG tablet Take 0.5 tablets by mouth 2 times daily 10/25/21   LANE Murphy CNP   sodium hypochlorite (DAKINS) 0.125 % SOLN external solution Apply Dakin's moistened gauze dressings to wound twice daily and as needed. Patient taking differently: 2 times daily as needed (BID and PRN when wound vac not working or in place) Apply Dakin's moistened gauze dressings to wound twice daily and as needed when wound vac not working or in place.  8/24/21   LANE Damon CNP   sodium chloride 1 g tablet Take 1 tablet by mouth 2 times daily 8/3/21   James Jacques MD   FLUoxetine (PROZAC) 40 MG capsule Take 1 capsule by mouth daily 7/16/21   June Romero MD   Multiple Vitamins-Minerals (MULTIVITAMIN WOMEN PO) Take 1 tablet by mouth daily    Historical Provider, MD   acetaminophen (TYLENOL) 650 MG extended release tablet Take 650 mg by mouth every 8 hours as needed for Pain    Historical Provider, MD   docusate sodium (COLACE) 100 MG capsule Take 100 mg by mouth as needed for Constipation (Daily PRN) Historical Provider, MD   aspirin 81 MG tablet Take 81 mg by mouth daily     Historical Provider, MD     Allergies: Patient has no known allergies. IP meds : Scheduled Meds:   [Held by provider] aspirin  81 mg Oral Daily    [Held by provider] bumetanide  0.5 mg Oral Daily    [START ON 4/13/2022] famotidine  20 mg PEG Tube Every Other Day    ferrous sulfate  325 mg Oral Every Other Day    FLUoxetine  40 mg Oral Daily    metoprolol tartrate  12.5 mg Oral BID    sodium chloride  1 g Oral BID    sodium chloride flush  5-40 mL IntraVENous 2 times per day     Continuous Infusions:   sodium chloride      sodium chloride 100 mL/hr at 04/12/22 0528    sodium chloride       Review of Systems Physical Exam   Review of Systems   Unable to perform ROS: Other   Poor historian and probably uremic Physical Exam  Vitals reviewed. Constitutional:       General: She is not in acute distress. Appearance: Normal appearance. She is not diaphoretic. HENT:      Head: Normocephalic and atraumatic. Right Ear: External ear normal.      Left Ear: External ear normal.      Nose: Nose normal.      Mouth/Throat:      Mouth: Mucous membranes are moist.   Eyes:      General: No scleral icterus. Right eye: No discharge. Left eye: No discharge. Conjunctiva/sclera: Conjunctivae normal.   Neck:      Thyroid: No thyromegaly. Vascular: No JVD. Cardiovascular:      Rate and Rhythm: Normal rate and regular rhythm. Heart sounds: Normal heart sounds. No murmur heard. Pulmonary:      Effort: Pulmonary effort is normal. No respiratory distress. Breath sounds: Normal breath sounds. No stridor. Chest:      Chest wall: No tenderness. Abdominal:      General: Bowel sounds are normal. There is no distension. Palpations: Abdomen is soft. Tenderness: There is no abdominal tenderness. Musculoskeletal:         General: No swelling or tenderness.       Cervical back: Normal range of motion and neck supple. Right lower leg: No edema. Left lower leg: No edema. Skin:     General: Skin is warm and dry. Findings: No erythema or rash. Neurological:      General: No focal deficit present. Mental Status: She is alert and oriented to person, place, and time. Psychiatric:         Mood and Affect: Mood normal.         Behavior: Behavior normal.           Vitals:    04/12/22 0615   BP: (!) 117/55   Pulse: 81   Resp: 16   Temp: 99 °F (37.2 °C)   SpO2: 100%     Labs, Radiology and Tests       Recent Labs     04/11/22 2030 04/12/22  0336   WBC 10.7  --    RBC 2.67*  --    HGB 7.5* 6.8*   HCT 25.2* 23.1*   MCV 94.4  --    MCH 28.1  --    MCHC 29.8*  --      --      Recent Labs     04/11/22 2030 04/12/22  0224   *  --    K 5.4* 5.3*   CL 99  --    CO2 18*  --    *  --    CREATININE 4.9*  --    CALCIUM 9.2  --    PROT 7.0  --    LABALBU 2.5*  --    BILITOT <0.2*  --    ALKPHOS 79  --    AST 25  --    ALT 25  --        Radiology : Renal ultrasound scan shows increase in renal cortical echogenicity bilaterally left-sided nephrostomy tube noted multiple left-sided renal calculi some fluid collection noted on the left kidney    Other : Old lab data have been reviewed which shows patient's baseline creatinine was around 1.4-1.5    Echocardiogram 1/22 shows ejection fraction 60%    Assessment    1 Renal -acute kidney injury on chronic kidney disease stage III exact etiology is not clear however it appears most likely some component of volume depletion and the use of diuretic. ? Renal ultrasound scan does not show any acute abnormalities. ? Continue aggressive hydration check a BMP today  ? Significantly uremic with a BUN of 159. She is also anemic  ?  Avoid any nephrotoxins or any IV contrast exposure    2 Electrolytes -mild hyperkalemia hydrate and monitor, currently on Lokelma  3 Mild acidosis secondary to renal dysfunction  4 Anemia probably due to bleeding through the

## 2022-04-12 NOTE — PROGRESS NOTES
Spoke to primary nurse. To clarify with attending on status of wound vac application to sacrum? Will need orders if that's the plan. Recommend to continue wet to dry dressing changes to sacrum at this time.

## 2022-04-12 NOTE — ED NOTES
Pt to er. Pt sent in for abnormal BUN and Creat. Pt denies any complaints. Denies any pain. Pt a & o x 4. Resp regular. Sister states that pt has had to be put on dialysis before. Assessment completed. Call light in reach.       Esmer Pool RN  04/11/22 2040

## 2022-04-12 NOTE — ED NOTES
ED nurse-to-nurse bedside report    Chief Complaint   Patient presents with    Abnormal Lab      LOC: alert and orientated to name, place, date  Vital signs   Vitals:    04/11/22 2024 04/11/22 2141 04/11/22 2241   BP: (!) 135/58 122/71 (!) 129/55   Pulse: 89 88 87   Resp: 18 16 18   Temp: 99.4 °F (37.4 °C)     TempSrc: Oral     SpO2: 92% 92% 91%   Weight: 134 lb (60.8 kg)     Height: 4' 9\" (1.448 m)        Pain:    Pain Interventions: none  Pain Goal:   Oxygen: No    Current needs required ra   Telemetry: Yes  LDAs:   Peripheral IV 04/11/22 Right Hand (Active)   Site Assessment Clean;Dry; Intact 04/11/22 2241   Line Status Normal saline locked 04/11/22 2241   Dressing Status Clean;Dry; Intact 04/11/22 2241     Continuous Infusions:   Mobility: Fully dependent  Friedman Fall Risk Score:    Fall Risk 9/11/2020 6/18/2019 10/4/2017   2 or more falls in past year? no no no   Fall with injury in past year? no no no     Fall Interventions: sr up x 2  Report given to: [place]       Stevan Blancas RN  04/11/22 7849

## 2022-04-12 NOTE — CONSULTS
Date of Consult: 4/12/22  Consulting Provider: Dr. Kevin Tillman  Reason For Consult: Gross Hematuria from Left Nephrostomy Tube    Ms. Diop was seen in follow up for   Chief Complaint   Patient presents with    Abnormal Lab        HPI:  Ms. Jerardo Cerda is a 42-year-old female with multiple medical co-morbidities that presented to the [facility] ED for ALVIN secondary to dehydration and gross bleeding from her nephrostomy tube. Her urological history is significant for a staghorn calculus of the left kidney. She has had a left nephrostomy tube in placed for decompression and access in October 2021. She has been unable to undergo a left ESWL due to chronically ill state. She has had multiple nephrostomy tube re-insertions in IR with the last on 3/14/2022. Her sister reports that she has been having bright red blood in her nephrostomy tube over the last three days. The patient is awake and alert, but unable to give any further information and no family members are present for additional information.      Past Medical History:   Diagnosis Date    CHF (congestive heart failure) ([facility])     Dr. Megan Benites Depression     Gout attack     Hypertension     Osteoarthritis     Pulmonary artery hypertension ([facility] [address]     [facility]-- Dr. Weston Quick-- Dr. Megan Benites Renal calculi     Dr. Светлана Manzanares Thrombocytopenia [facility])        Past Surgical History:   Procedure Laterality Date    APPENDECTOMY  1960    BRONCHOSCOPY N/A 11/22/2021    BRONCHOSCOPY performed by Katarzyna Luz MD at [address] 11/30/2021    BRONCHOSCOPY WITHOUT FLURO performed by Katarzyna Luz MD at [facility]    COLECTOMY N/A 1/27/2022    LAPAROSCOPY WITH DIVERTING LOOP COLOSTOMY performed by Nikunj Huntley MD at [place] [address] N/A 11/24/2021    EGD PEG TUBE PLACEMENT performed by Samia Rey MD at [facility]    IR [address]  11/26/2021    IR CHEST TUBE INSERTION 11/26/2021 [facility]    IR NEPHROSTOMY PERCUTANEOUS LEFT  11/1/2021    IR NEPHROSTOMY PERCUTANEOUS LEFT 11/1/2021 MD JORDON Richard SPECIAL PROCEDURES    IR NEPHROSTOMY PERCUTANEOUS LEFT  3/14/2022    IR NEPHROSTOMY PERCUTANEOUS LEFT 3/14/2022 MD JORDON Geller SPECIAL PROCEDURES    PRESSURE ULCER DEBRIDEMENT Right 8/6/2021    EXCISION RIGHT BUTTOCK WOUND AND CLOSURE performed by Lisa Banegas MD at [place] [facility]       No current facility-administered medications on file prior to encounter. Current Outpatient Medications on File Prior to Encounter   Medication Sig Dispense Refill    bumetanide (BUMEX) 0.5 MG tablet Take 0.5 mg by mouth daily      metoclopramide (REGLAN) 5 MG tablet Take 5 mg by mouth every 8 hours      sodium chloride 0.9 % SOLN Inject as directed Flush TID nephrostomy tube 10ml.  miconazole (MICOTIN) 2 % powder Apply topically daily as needed for Itching Apply topically 2 times daily.  silver nitrate applicators 64-28 % applicator Apply 1 each topically Once a week on Friday and PRN to prowed flesh at peg site      folic acid (FOLVITE) 1 MG tablet Take 1 tablet by mouth daily 30 tablet 3    ferrous sulfate (IRON 325) 325 (65 Fe) MG tablet Take 1 tablet by mouth every other day (Patient taking differently: Take 325 mg by mouth daily (with breakfast) ) 30 tablet 1    melatonin 3 MG TABS tablet Take 2 tablets by mouth nightly as needed (anxiety, sleep) 30 tablet 1    famotidine (PEPCID) 20 MG tablet 20 mg by PEG Tube route daily      senna (SENOKOT) 8.8 MG/5ML SYRP syrup Take 5 mLs by mouth nightly as needed      metoprolol tartrate (LOPRESSOR) 25 MG tablet Take 0.5 tablets by mouth 2 times daily 90 tablet 1    sodium hypochlorite (DAKINS) 0.125 % SOLN external solution Apply Dakin's moistened gauze dressings to wound twice daily and as needed.  (Patient taking differently: 2 times daily as needed (BID and PRN when wound vac not working or in place) Apply Dakin's moistened gauze dressings to wound twice daily and as needed when wound vac not working or in place.) 1 Bottle 2    sodium chloride 1 g tablet Take 1 tablet by mouth 2 times daily 240 tablet 3    FLUoxetine (PROZAC) 40 MG capsule Take 1 capsule by mouth daily 90 capsule 3    Multiple Vitamins-Minerals (MULTIVITAMIN WOMEN PO) Take 1 tablet by mouth daily      acetaminophen (TYLENOL) 650 MG extended release tablet Take 650 mg by mouth every 8 hours as needed for Pain      docusate sodium (COLACE) 100 MG capsule Take 100 mg by mouth as needed for Constipation (Daily PRN)       aspirin 81 MG tablet Take 81 mg by mouth daily          No Known Allergies    Family History   Problem Relation Age of Onset    Diabetes Father    Mercedes Arthritis Mother     COPD Mother     Diabetes Sister     Heart Disease Maternal Uncle     Breast Cancer Niece 36    Sleep Apnea Brother     Asthma Neg Hx     Birth Defects Neg Hx     Cancer Neg Hx     Depression Neg Hx     Early Death Neg Hx     Hearing Loss Neg Hx     High Blood Pressure Neg Hx     High Cholesterol Neg Hx     Kidney Disease Neg Hx     Learning Disabilities Neg Hx     Mental Illness Neg Hx     Mental Retardation Neg Hx     Miscarriages / Stillbirths Neg Hx     Stroke Neg Hx     Substance Abuse Neg Hx     Vision Loss Neg Hx     Other Neg Hx        Social History     Socioeconomic History    Marital status: Single     Spouse name: Not on file    Number of children: 0    Years of education: Not on file    Highest education level: Not on file   Occupational History    Not on file   Tobacco Use    Smoking status: Never Smoker    Smokeless tobacco: Never Used   Vaping Use    Vaping Use: Never used   Substance and Sexual Activity    Alcohol use: No    Drug use: No    Sexual activity: Not Currently   Other Topics Concern    Not on file   Social History Narrative    Not on file     Social Determinants of Health     Financial Resource Strain: Low Risk     Difficulty of Paying Living Expenses: Not very hard   Food Insecurity: No Food Insecurity    Worried About Running Out of Food in the Last Year: Never true    Traci of Food in the Last Year: Never true   Transportation Needs:     Lack of Transportation (Medical): Not on file    Lack of Transportation (Non-Medical): Not on file   Physical Activity:     Days of Exercise per Week: Not on file    Minutes of Exercise per Session: Not on file   Stress:     Feeling of Stress : Not on file   Social Connections:     Frequency of Communication with Friends and Family: Not on file    Frequency of Social Gatherings with Friends and Family: Not on file    Attends [employer] Services: Not on file    Active Member of [address] [place] [address] [employer] or Organizations: Not on file    Attends Club or Organization Meetings: Not on file    Marital Status: Not on file   Intimate Partner Violence:     Fear of Current or Ex-Partner: Not on file    Emotionally Abused: Not on file    Physically Abused: Not on file    Sexually Abused: Not on file   Housing Stability:     Unable to Pay for Housing in the Last Year: Not on file    Number of [place] in the Last Year: Not on file    Unstable Housing in the Last Year: Not on file       Review of Systems  Unable to be obtained as patient is not verbally responding to questions. Exam  Constitutional: Oriented to person. Vital signs are normal. Appears chronically ill. Cooperative. No distress. HENT:    Head: Normocephalic and atraumatic. Eyes: Pupils are equal, round, and reactive to light. Right eye exhibits no discharge. Left eye exhibits no discharge. No scleral icterus. Neck: Trachea normal. No JVD present. Cardiovascular: Normal rate and regular rhythm. Pulmonary/Chest: Effort normal. No respiratory distress. No wheezes, rhonchi, or rales. Abdominal: Soft. Exhibits no distension or tenderness. PEG tube in place.  Nephrostomy tube (left) with bright red Tube- Will obtain CT non-contrast abdomen and pelvis. Renal US demonstrates a subcapsular fluid collection adjacent to the left kidney. IR has been consulted to see if they can assess left nephrostomy tube position and change if needed. If nephrostomy tube needs to be changed, a nephroureteral catheter would be recommended to try to prevent any further dislodging with positional changes. Urine culture pending. Left ESWL has not been scheduled due to patient's continuing medical problems. 2. 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 CT a/p without contrast pending. Likely multifactorial. May be a component of volume depletion and diuretics. Hydration. Nephrology managing. 3. Hyperkalemia- On Lokelma. 4. Acute Blood Loss Anemia- Receiving packed red blood cells. Serial H/H's.    5. Chronic Diastolic Heart Failure- Diuretics held.      Gloria Roth PA-C  4/12/22

## 2022-04-12 NOTE — ED NOTES
Pt repositioned to rt side. Denies all needs. Family at side. Call light in reach.       Manisha Freeman RN  04/11/22 1462

## 2022-04-12 NOTE — PROGRESS NOTES
Pharmacy Renal Adjustment    Fariha Scott is a 73 y.o. female. Pharmacy renally adjust the following medications per P&T approved policy: Pepcid    Height:   Ht Readings from Last 1 Encounters:   04/12/22 4' 9\" (1.448 m)     Weight:  Wt Readings from Last 1 Encounters:   04/11/22 134 lb (60.8 kg)     Recent Labs     04/11/22 2030   *   CREATININE 4.9*     CrCl cannot be calculated (Unknown ideal weight.). Calculated CrCl: ~10 mL/min    Assessment:  ALVIN on CKD    Plan:   Decrease Pepcid to 20 mg per Peg Tube every other day.   Nayeli Sharma, [address] 4/12/2022 3:36 AM

## 2022-04-12 NOTE — ED NOTES
Pt resting in bed with resp regular. Updated on admission. Denies other needs. Call light in reach.       Jose Hensley RN  04/11/22 1738

## 2022-04-12 NOTE — ED PROVIDER NOTES
[address] [facility]\"" [address] [address] EMERGENCY DEPT      CHIEF COMPLAINT       Chief Complaint   Patient presents with    Abnormal Lab       Nurses Notes reviewed and I agree except as noted in the HPI. HISTORY OF PRESENT ILLNESS    Marilyn Burton is a 73 y.o. female who presents with complaint of need to check kidney function. No specific complaints other than generalized weakness for the past 1 week. Patient was admitted approximately 4 days ago for a nephrostomy tube replacement. She has history of left obstructive stone that is being followed by urology. She is residing at a nursing home for rehab, due to medical decline deconditioning, she status post colectomy with a diverting loop colostomy, status post PEG tube placement, history of stage III sacral decub. Family states that they noticed that she is getting overall weaker. Otherwise patient denies any focal pain complaints. Family also reports that since the nephrostomy tube was placed 3 days ago, they have noticed some blood in the nephrostomy tubes, blood in her chronic Padilla. Patient has no fever chills, no coughs. Onset: Acute on chronic  Duration: Has been sick for the past 1 week  Timing: Persistent  Location of Pain: No pain complaints  Intesity/severity:   Modifying Factors: History of kidney failure, history of obstructive kidney stones  Relieved by;  Previous Episodes; Tx Before arrival: Nephrostomy tubes  REVIEW OF SYSTEMS      Review of Systems   Constitutional: Negative for fever, chills, diaphoresis; positive for generalized weakness and fatigue. HENT: Negative for congestion, drooling, facial swelling and sore throat. Eyes: Negative for photophobia, pain and discharge. Respiratory: Negative for cough, shortness of breath, wheezing and stridor. Cardiovascular: Negative for chest pain, palpitations and leg swelling. Gastrointestinal: Negative for abdominal pain, blood in stool and abdominal distention.    Endocrine: Negative for cold Take 5 mg by mouth every 8 hours    METOPROLOL TARTRATE (LOPRESSOR) 25 MG TABLET    Take 0.5 tablets by mouth 2 times daily    MICONAZOLE (MICOTIN) 2 % POWDER    Apply topically daily as needed for Itching Apply topically 2 times daily. MULTIPLE VITAMINS-MINERALS (MULTIVITAMIN WOMEN PO)    Take 1 tablet by mouth daily    RIOCIGUAT (ADEMPAS) 2.5 MG TABS    Take 2.5 mg by mouth 3 times daily    SENNA (SENOKOT) 8.8 MG/5ML SYRP SYRUP    Take 5 mLs by mouth nightly as needed    SILVER NITRATE APPLICATORS 00-20 % APPLICATOR    Apply 1 each topically Once a week on Friday and PRN    SODIUM CHLORIDE 0.9 % SOLN    Inject as directed Flush TID nephrostomy tube 10ml. SODIUM CHLORIDE 1 G TABLET    Take 1 tablet by mouth 2 times daily    SODIUM HYPOCHLORITE (DAKINS) 0.125 % SOLN EXTERNAL SOLUTION    Apply Dakin's moistened gauze dressings to wound twice daily and as needed. ALLERGIES     has No Known Allergies. FAMILY HISTORY     She indicated that her mother is . She indicated that her father is . She indicated that the status of her sister is unknown. She indicated that the status of her brother is unknown. She indicated that the status of her maternal uncle is unknown. She indicated that the status of her neg hx is unknown. She indicated that her niece is alive. family history includes Arthritis in her mother; Breast Cancer (age of onset: 36) in her niece; COPD in her mother; Diabetes in her father and sister; Heart Disease in her maternal uncle; Sleep Apnea in her brother. SOCIAL HISTORY      reports that she has never smoked. She has never used smokeless tobacco. She reports that she does not drink alcohol and does not use drugs. PHYSICAL EXAM     INITIAL VITALS:  height is 4' 9\" (1.448 m) and weight is 134 lb (60.8 kg). Her oral temperature is 99.4 °F (37.4 °C). Her blood pressure is 125/57 (abnormal) and her pulse is 88. Her respiration is 17 and oxygen saturation is 98%. Physical Exam   Constitutional:  well-developed and well-nourished. HENT: Head: Normocephalic, atraumatic, Bilateral external ears normal, Oropharynx mosit, No oral exudates, Nose normal.   Eyes: PERRL, EOMI, Conjunctiva normal, No discharge. No scleral icterus  Neck: Normal range of motion, No tenderness, Supple  Cardiovascular: Normal rate, regular rhythm, S1 normal and S2 normal.  Exam reveals no gallop. Pulmonary/Chest: Effort normal and breath sounds normal. No accessory muscle usage or stridor. No respiratory distress. no wheezes. has no rales. exhibits no tenderness. Abdominal: Soft. Bowel sounds are normal.  exhibits no distension. There is no tenderness. There is no rebound and no guarding. Positive for colostomy/PEG tube. genitourinary: Positive for left nephrostomy tube, Padilla catheter in place. Extremities: No edema, no tenderness, no cyanosis, no clubbing. Musculoskeletal: Good range of motion in major joints is observed. No major deformities noted. Stage III sacral decub. Neurological: Alert and oriented ×3, normal motor function, normal sensory function, no focal deficits. GCS 15  Skin: Skin is warm, dry and intact. No rash noted. No erythema. Psychiatric: Affect normal, judgment normal, mood normal.  DIFFERENTIAL DIAGNOSIS:     DIAGNOSTIC RESULTS     EKG: All EKG's are interpreted by the Emergency Department Physician who either signs or Co-signs this chart in the absence of a cardiologist.      RADIOLOGY: non-plain film images(s) such as CT, Ultrasound and MRI are read by the radiologist.  Plain radiographic images are visualized and preliminarily interpreted by the emergency physician unless otherwise stated below.       LABS:   Labs Reviewed   CBC WITH AUTO DIFFERENTIAL - Abnormal; Notable for the following components:       Result Value    RBC 2.67 (*)     Hemoglobin 7.5 (*)     Hematocrit 25.2 (*)     MCHC 29.8 (*)     RDW-CV 16.4 (*)     RDW-SD 56.9 (*)     MPV 8.5 (*) Segs Absolute 8.7 (*)     Lymphocytes Absolute 0.8 (*)     All other components within normal limits   BASIC METABOLIC PANEL - Abnormal; Notable for the following components:    Sodium 134 (*)     Potassium 5.4 (*)     CO2 18 (*)     Glucose 128 (*)      (*)     CREATININE 4.9 (*)     All other components within normal limits   HEPATIC FUNCTION PANEL - Abnormal; Notable for the following components:    Albumin 2.5 (*)     Total Bilirubin <0.2 (*)     All other components within normal limits   TROPONIN - Abnormal; Notable for the following components:    Troponin T 0.222 (*)     All other components within normal limits   ANION GAP - Abnormal; Notable for the following components:    Anion Gap 17.0 (*)     All other components within normal limits   GLOMERULAR FILTRATION RATE, ESTIMATED - Abnormal; Notable for the following components:    Est, Glom Filt Rate 9 (*)     All other components within normal limits   OSMOLALITY - Abnormal; Notable for the following components:    Osmolality Calc 322.1 (*)     All other components within normal limits   URINALYSIS WITH MICROSCOPIC   CREATININE, RANDOM URINE   SODIUM, URINE, RANDOM   TROPONIN   TROPONIN       EMERGENCY DEPARTMENT COURSE:   Vitals:    Vitals:    04/11/22 2241 04/11/22 2317 04/11/22 2328 04/12/22 0027   BP: (!) 129/55 (!) 124/55  (!) 125/57   Pulse: 87 90  88   Resp: 18 23  17   Temp:       TempSrc:       SpO2: 91% (!) 88% 99% 98%   Weight:       Height:         Patient presenting with complaint of generalized weakness, sent in for kidney function recheck per nursing home staff, she has had blood in her nephrostomy tubes for the past 3 days. Patient has worsening kidney function, plan is to have her admitted to the hospitalist and be evaluated by urology/nephrology in the morning. Otherwise patient has no complaints. CRITICAL CARE:       Comfortable going home at    CONSULTS:  None    PROCEDURES:  None    FINAL IMPRESSION      1.  Acute renal failure superimposed on chronic kidney disease, unspecified CKD stage, unspecified acute renal failure type ([facility] [address]          DISPOSITION/PLAN   Admitted    PATIENT REFERRED TO:  No follow-up provider specified.     DISCHARGE MEDICATIONS:  New Prescriptions    No medications on file       (Please note that portions of this note were completed with a voice recognition program.  Efforts were made to edit the dictations but occasionally words are mis-transcribed.)    Aj Orlando, [address] [place] [address],   04/12/22 0113

## 2022-04-12 NOTE — PLAN OF CARE
Problem: DISCHARGE BARRIERS  Goal: Patient's continuum of care needs are met  Outcome: Ongoing  Note: Patient return to [place] [address]. See  notes 4/12/22.

## 2022-04-12 NOTE — PROCEDURES
Bladder scan completed at 0300 and results told to [facility]. Scan showed 31 mL in the patients bladder. Patient has a seaman catheter.  Florentino Norris CET

## 2022-04-12 NOTE — PLAN OF CARE
Problem: Nutrition  Goal: Optimal nutrition therapy  Outcome: Ongoing   Nutrition Problem #1: Inadequate oral intake  Intervention: Food and/or Nutrient Delivery: Continue NPO (When able recommend resume diet and tubefeeding)  Nutritional Goals: Patient will receive nutrition support to provide 75% or more of estimated nutrient needs in 1-3 days

## 2022-04-12 NOTE — CARE COORDINATION
4/12/22, 1:42 PM EDT  DISCHARGE PLANNING EVALUATION:    Margarito Cardona       Admitted: 4/11/2022/ [address] day: 0   Location: -18/018-A Reason for admit: ALVIN (acute kidney injury) ([facility] [address] [N17.9]  Acute renal failure superimposed on chronic kidney disease, unspecified CKD stage, unspecified acute renal failure type ([facility] [address] [N17.9, N18.9]   PMH:  has a past medical history of CHF (congestive heart failure) ([facility] [address] Depression, Gout attack, Hypertension, Osteoarthritis, Pulmonary artery hypertension ([facility] [address] Renal calculi, and Thrombocytopenia ([facility] [address] Procedure: none  Barriers to Discharge:  K+5.3, CO2 17, creat 4.9, Hgb 7.2, trops elev. Using 2L oxygen, sats %. IVF, lokelma given. RN reports Johnny Ku is having bright red blood from nephrostomy tube. CT abdomen ordered, on hold as nephrology not wanted use of dye due to renal impairment. Urology following. PCP: Robson Reardon MD  Readmission Risk Score: 25.7 ( )%    Patient Goals/Plan/Treatment Preferences: Return to [place] [address]. Transportation/Food Security/Housekeeping Addressed:  No issues identified.

## 2022-04-12 NOTE — ED NOTES
ED to inpatient nurses report    Chief Complaint   Patient presents with    Abnormal Lab      Present to ED from home  LOC: alert and orientated to name, place, date  Vital signs   Vitals:    04/11/22 2241 04/11/22 2317 04/11/22 2328 04/12/22 0027   BP: (!) 129/55 (!) 124/55  (!) 125/57   Pulse: 87 90  88   Resp: 18 23  17   Temp:       TempSrc:       SpO2: 91% (!) 88% 99% 98%   Weight:       Height:          Oxygen Baseline room air     Current needs required 1-2 L NC   LDAs:   Peripheral IV 04/11/22 Right Hand (Active)   Site Assessment Clean;Dry; Intact 04/11/22 2241   Line Status Normal saline locked 04/11/22 2241   Dressing Status Clean;Dry; Intact 04/11/22 2241     Mobility: Requires assistance * 2  Pending ED orders: complete  Present condition: stable      Electronically signed by John Hernandez RN on 4/12/2022 at 12:50 AM       John Hernandez RN  04/12/22 4979

## 2022-04-12 NOTE — PROGRESS NOTES
Pharmacy Medication History Note      List of current medications patient is taking is complete. Source of information: Leidy Higginbotham of [facility]    Changes made to medication list:  Medications removed (include reason, ex. therapy complete or physician discontinued):  None    Medications added/doses adjusted:  None    Other notes (ex. Recent course of antibiotics, Coumadin dosing):  Denies use of other OTC or herbal medications.       Allergies reviewed      Electronically signed by David Ku on 4/12/2022 at 2:17 PM

## 2022-04-12 NOTE — CARE COORDINATION
4/12/22, 11:36 AM EDT  Discharge Planning Evaluation  Social work consult received, patient from [facility]. Patient/Family preference is to return to [place] [address]. The patient's current payor source at the facility is pending medicaid. Medicare skilled days available: yes  Insurance precert:  no  Spoke with Lou at the facility. Patient bed hold: yes unofficial  Anticipated transport plan: ambulance/ambulette  Do they require COVID 19 test to return to ECF: yes   Is there a required time frame which which COVID test needs done: needed within 48 hours. DOT spoke with RN Summer regarding consult for Spiritual Care regarding POA papers needing to be signed.

## 2022-04-12 NOTE — FLOWSHEET NOTE
[facility] 88 PROGRESS NOTE      Patient: Sonido Cherry  Room #: 6C-07/982-Z            YOB: 1950  Age: 73 y.o. Gender: female            Admit Date & Time: 4/11/2022  8:20 PM    Assessment:    Interventions:   Outcomes:     Patient had been listed as a consult for Advance directives as of 11:45 AM 4/12. At approx. [facility] received call from nurse reporting that family wanted  for Adv Dir. paperwork.  proceeded to room. Brother and sister were both present; They were motivated to have POA established. Patient was responsive, but exhibited reluctance.  inquired if she felt anxious; She nodded she did. Informed sister and brother that patient must be able and ready to convey her wishes. They expressed understanding. Spoke with nurse about watching for patient to be more interactive and able to make decisions. Left after working with staff. Plan:    1. Follow up and attempt to complete naming of decision maker/s. Electronically signed by Cheral Simmonds, on 4/12/2022 at 3:02 PM.  [facility]  [phone]                 04/12/22 1357   Encounter Summary   Services provided to: Patient and family together   Referral/Consult From: Nurse   Support System Family members   Place of [address]iting Yes  (04/12/2022)   Complexity of Encounter Moderate   Length of Encounter 30 minutes   Spiritual Assessment Completed Yes   Advance Care Planning Yes   Routine   Type Initial  (Nurse Consult 4 adv doir @ family request)   Assessment Anxious; Fearful;Spiritual struggle   Intervention Active listening;Explored coping resources;Nurtured hope   Outcome Acceptance;Expressed gratitude

## 2022-04-12 NOTE — DISCHARGE INSTR - COC
2Continuity of Care Form    Patient Name: Jamie Pereira   :  1950  MRN:  [id number]    Admit date:  2022  Discharge date:  22    Code Status Order: Full Code   Advance Directives:      Admitting Physician:  Jaret Arteaga MD  PCP: Raul Hammonds MD    Discharging Nurse: Vee Workman [facility] Unit/Room#: 9E-50/054-Z  Discharging Unit Phone Number: [phone]    Emergency Contact:   Extended Emergency Contact Information  Primary Emergency Contact: Suszanne Hashimoto [address] Phone: [phone]  Mobile Phone: [phone]  Relation: Brother/Sister  Secondary Emergency Contact: Janell Yadav [address] Phone: [phone]  Mobile Phone: [phone]  Relation: Brother/Sister    Past Surgical History:  Past Surgical History:   Procedure Laterality Date    APPENDECTOMY  1960    BRONCHOSCOPY N/A 2021    BRONCHOSCOPY performed by Torsten Tran MD at  [facility]    BRONCHOSCOPY N/A 2021    BRONCHOSCOPY WITHOUT FLURO performed by Torsten Tran MD at  [facility]    COLECTOMY N/A 2022    LAPAROSCOPY WITH DIVERTING LOOP COLOSTOMY performed by Gretchen Li MD at [facility] N/A 2021    EGD PEG TUBE PLACEMENT performed by Lane Dobbins MD at  [facility]    IR [address]  2021    IR CHEST TUBE INSERTION 2021 [facility]    IR NEPHROSTOMY PERCUTANEOUS LEFT  2021    IR NEPHROSTOMY PERCUTANEOUS LEFT 2021 MD [facility]    IR NEPHROSTOMY PERCUTANEOUS LEFT  3/14/2022    IR NEPHROSTOMY PERCUTANEOUS LEFT 3/14/2022 MD [facility]    PRESSURE ULCER DEBRIDEMENT Right 2021    EXCISION RIGHT BUTTOCK WOUND AND CLOSURE performed by Tiffany Barber [facility] at [facility]       Immunization History:   Immunization History   Administered Date(s) Administered    COVID-19, Pfizer Purple top, DILUTE for use, 12+ yrs, 30mcg/0.3mL dose 02/11/2021, 03/04/2021    Influenza, MDCK Quadv, IM, PF (Flucelvax 2 yrs and older) 10/15/2020    Influenza, Quadv, IM, (6 mo and older Fluzone, Flulaval, Fluarix and 3 yrs and older Afluria) 10/11/2017    Influenza, Quadv, IM, PF (6 mo and older Fluzone, Flulaval, Fluarix, and 3 yrs and older Afluria) 09/17/2019    Pneumococcal Conjugate 13-valent (Mellisa Wetmore) 10/11/2017    Pneumococcal Polysaccharide ([id number]) 06/18/2019       Active Problems:  Patient Active Problem List   Diagnosis Code    Essential hypertension I10    Chronic depression F32. A    CHF (congestive heart failure) ([facility]) I50.9    Thrombocytopenia ([facility]) D69.6    Moderate episode of recurrent major depressive disorder ([facility]) F33.1    Uremia N19    Hyperkalemia E87.5    Isolated non-nephrotic proteinuria R80.0    ALVIN (acute kidney injury) ([facility] [address] N17.9    Chronic right-sided heart failure ([facility]) I50.812    Pulmonary HTN ([facility]) I27.20    Hypotension due to hypovolemia I95.89, E86.1    Acute renal failure superimposed on stage 4 chronic kidney disease (HCC) N17.9, N18.4    Pressure injury of sacral region, stage 4 ([facility]) L89.154    Acute respiratory failure ([facility]) J96.00    Flash pulmonary edema ([facility]) J81.0    Shock ([facility]) R57.9    Aspiration pneumonia of left lung ([facility]) J69.0    Pleural effusion J90    Paroxysmal atrial fibrillation ([facility]) I48.0    Hemoptysis R04.2    Chronic respiratory failure with hypoxia ([facility]) J96.11    Pneumothorax, right J93.9    Collapse of left lung J98.11    Abnormal chest x-ray R93.89    Acute on chronic respiratory failure with hypoxia ([facility]) J96.21    Retroperitoneal hematoma K66.1    HAP (hospital-acquired pneumonia) J18.9, Y95    Colostomy in place ([facility] [address] Z93.3    PEG (percutaneous endoscopic gastrostomy) status ([facility]) Z93.1    Intertriginous dermatitis associated with moisture L30.4    Peristomal dermatitis associated with moisture L30.8 Nephrostomy tube displaced (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        PCR 3/2022    ESBL (Extended Spectrum Beta Lactamase) 01/19/22 01/22/22 01/19/22 Culture, Reflexed, Urine        Proteus urine 1/2022    Resolved    COVID-19 (Rule Out) 10/27/21 10/27/21 10/27/21 COVID-19, Rapid (Ordered)   10/27/21 Rule-Out Test Resulted            Nurse Assessment:  Last Vital Signs: BP (!) 114/54   Pulse 67   Temp 97.7 °F (36.5 °C) (Axillary)   Resp 16   Ht 4' 9\" (1.448 m)   Wt 154 lb (69.9 kg)   SpO2 100%   BMI 33.33 kg/m²     Last documented pain score (0-10 scale): Pain Level: 0  Last Weight:   Wt Readings from Last 1 Encounters:   04/27/22 154 lb (69.9 kg)     Mental Status:  oriented    IV Access:  - Dialysis Catheter  - site  right, insertion date: 4/22/22    Nursing Mobility/ADLs:  Walking   Assisted  Transfer  Assisted  Bathing  Assisted  Dressing  Assisted  Toileting  Assisted  Feeding  Assisted  Med Admin  Assisted  Med Delivery   whole    Wound Care Documentation and Therapy:  Wound 08/24/21 Sacrum (Active)   Wound Etiology Pressure Stage  4 04/25/22 2037   Dressing Status Clean;Dry; Intact 04/26/22 0915   Wound Cleansed Wound cleanser 04/26/22 0915   Dressing/Treatment ABD;Packing 04/26/22 0915   Dressing Change Due 04/15/22 04/14/22 2015   Wound Length (cm) 4 cm 04/19/22 0506   Wound Width (cm) 7 cm 04/19/22 0506   Wound Depth (cm) 3 cm 04/19/22 0506   Wound Surface Area (cm^2) 28 cm^2 04/19/22 0506   Change in Wound Size % (l*w) -6.67 04/19/22 0506   Wound Volume (cm^3) 84 cm^3 04/19/22 0506   Wound Healing % 16 04/19/22 0506   Wound Assessment Erythema;Pink/red; Exposed structure fascia; Hyper granulation tissue 04/26/22 0915   Drainage Amount Small 04/26/22 0915   Drainage Description Serosanguinous 04/26/22 0915   Odor Mild 04/26/22 0915   Katey-wound Assessment Maceration; Non-blanchable erythema 04/26/22 0915   Margins Unattached edges 04/26/22 0915   Wound Thickness Description not for Pressure Injury Full thickness 04/26/22 0915   Number of days: 245       Wound 03/13/22 Toe (Comment  which one) Anterior; Left left second toe (Active)   Wound Etiology Other 04/26/22 0915   Dressing Status Clean;Dry; Intact 04/26/22 0915   Wound Cleansed Not Cleansed 04/26/22 0915   Dressing/Treatment Other (comment) 04/26/22 0915   Offloading for Diabetic Foot Ulcers Offloading ordered 04/26/22 0915   Wound Assessment Other (Comment) 04/26/22 0915   Drainage Amount Small 04/26/22 0915   Drainage Description Serosanguinous 04/26/22 0915   Odor None 04/26/22 0915   Katey-wound Assessment Erosion 04/26/22 0915   Margins Attached edges 04/26/22 0915   Number of days: 44       Wound 03/13/22 Toe (Comment  which one) Anterior;Right right second toe (Active)   Wound Etiology Other 04/26/22 0915   Dressing Status Clean;Dry; Intact 04/26/22 0915   Wound Cleansed Not Cleansed 04/26/22 0915   Dressing/Treatment Other (comment) 04/26/22 0915   Wound Assessment Other (Comment) 04/26/22 0915   Drainage Amount None 04/26/22 0915   Drainage Description Serosanguinous 04/26/22 0915   Odor None 04/26/22 0915   Katey-wound Assessment Fragile; Excoriated 04/26/22 0915   Number of days: 44        Elimination:  Continence:    Bowel: No  Bladder: No  Urinary Catheter: Indication for Use of Catheter: Urology/Urologist seeing this patient or inserted indwelling catheter   Colostomy/Ileostomy/Ileal Conduit: Yes  Colostomy LUQ Loop-Stomal Appliance: 2 piece,Clean, dry & intact  Colostomy LUQ Loop-Stoma  Assessment: Pink,Moist  Colostomy LUQ Loop-Mucocutaneous Junction: Intact  Colostomy LUQ Loop-Peristomal Assessment: Intact  Colostomy LUQ Loop-Treatment: Bag change  Colostomy LUQ Loop-Stool Appearance: Loose  Colostomy LUQ Loop-Stool Color: Brown  Colostomy LUQ Loop-Stool Amount: Large  Colostomy LUQ Loop-Output (mL): 0 ml    Date of Last BM: 4/28/22/colostomy    Intake/Output Summary (Last 24 hours) at 4/27/2022 0825  Last data filed at 4/27/2022 0612  Gross per 24 hour   Intake 990 ml   Output 2680 ml   Net -1690 ml     I/O last 3 completed shifts: In: 1432.6 [I.V.:10; NG/GT:640; IV Piggyback:382.6]  Out: 2955 [Urine:855; Stool:200]    Safety Concerns:     History of Falls (last 30 days)    Impairments/Disabilities:      Vision    Nutrition Therapy:  Current Nutrition Therapy:   - Tube Feedings:  Renal    Routes of Feeding:  PEG  Liquids: Nectar Thick Liquids  Daily Fluid Restriction: yes - amount 2000  Last Modified Barium Swallow with Video (Video Swallowing Test): not done    Treatments at the Time of Hospital Discharge:   Respiratory Treatments: n/a  Oxygen Therapy:  is on oxygen at 1 L/min per nasal cannula.   Ventilator:    - CPAP   only when sleeping    Rehab Therapies: Physical Therapy, Occupational Therapy, and Speech/Language Therapy  Weight Bearing Status/Restrictions: No weight bearing restrictions  Other Medical Equipment (for information only, NOT a DME order):  walker  Other Treatments: n/a    Patient's personal belongings (please select all that are sent with patient):  Harish    RN SIGNATURE:  Electronically signed by Kadeem Aguilera RN on 4/28/22 at 8:23 AM EDT    CASE MANAGEMENT/SOCIAL WORK SECTION    Inpatient Status Date: 4/12/22    Readmission Risk Assessment Score:  Readmission Risk              Risk of Unplanned Readmission:  42           Discharging to Facility/ Agency   Name:  [facility]  Address:    [place] [address]         Phone: [phone]       Fax: [phone]            Dialysis Facility (if applicable)   [address] [place] [address]

## 2022-04-12 NOTE — H&P
Hospitalist - History & Physical      Patient: Bindu Tovar    Unit/Bed:6K-18/018-A  YOB: 1950  MRN: [id number]   Acct: [de-identified]   PCP: Ruth Gray MD    Chief Complaint:  Abnormal lab, blood in nephrotomy tube    Assessment and Plan:      1. ALVIN Stage I on CKD stage IIIb: likely mixed Pre-renal ALVIN from dehydration and obstruction staghorn caliculi. Serum osmolarity elevate 322 which support diagnosis. Giving BUN/Cr 159/4.9 and eGFR 9 on admission. Baseline BUN/Cr 112/3.4 eGFR 41. Has fluctuate Cr level and required HD for short time last 12 months. Seen by Nephrologist Dr Terrell Valdez. Plan: NS @ 100ml/hr, Urine (Na, Cr), Nephrologist consult    2. Hyperkalemia: K+ 5.4 on admission. Likely 2/2 worsening kidney function. EKG: sinus rhythm with PVC, ST and T wave abnormality, no bradycardia. Plan: Start Lokelma 10mg TID, K+ q4hrs, BMP daily    3. Dislodged nephrotomy tube due to left obstructive   Hx of Left kidney staghorn calculus with Hx of ESBL. Not candidate for stone treatment. Nephrotomy tube place 1/12/22. Accidental removal nephrotomy and replacement of nephrotomy tube again 2/2022. Nephrotomy tube replacement again 3/14/22. Family reported blood drainage from nephrotomy tube since 4/9/22. Plan: Urologist consult, UA with reflex to culture. 4. Normocytic anemia: Hg 7.5 on admission with baseline Hg 8.0s-9.0s. Bight red blood on nephrotomy tube. Repeated Hg 6.8. Transfuse 1 Unit RBC. Plan: Check H& H q8hrs, transfusion if Hg < 7    5. Troponin elevate: likely type 2. 2/2 CKD. Troponin 0.222 on admission. Baseline Troponin 0.1s. EKG showed sinus tach with nonspecific ST/T wave change Asymptomatic. Plan: Troponin trending x 2, Repeated EKG 4/12/22    6. Chronic diastolic heart failure: Compensate. TOBIAS 10/2021 EF 60% with trace TR. On Bumex 0.5 mg daily. Plan: hold due to ALVIN and dehydration    7. Primary HTN: Controlled. Resume Lopressor     8.  Hx Stage III decubitus ulcer: s/p diverting colostomy 1/27/2022. Plan: Consult wound care. 9. Hypoalbuminemia: Serum Albumin 2.5 on admission with baseline 2.0s. Likely 2/2 malnutrition. Dietitian consult for PEG feed    10. SCOTT: Controlled. Resume Fluroxetine  11. GERD: Controlled. Resume Pepcid    Date of Service: Pt seen/examined on 04/12/22  and Admitted to Inpatient with expected LOS greater than two midnights due to medical therapy. Chief Complaint:  Abnormal lab    HPI:  Evon Mccurdy is a 73 y.o., , female presented to [facility] ED for abnormal lab from nursing home. Past medical history significant with chronic diastolic heart failure, obstructive staghorn calculus of the left kidney not a candidate for stone treatment which nephrostomy tube was placed, stage III decubitus ulcer, CKD stage III, general anxiety disorder. Visit patient awake alert and oriented to time person and place. Patient denies any chest pain, chest discomfort, abdominal pain, back pain, neck pain. Patient sister on the bedside report that patient had been having bright red blood in nephrostomy tube over last 3 days. Today patient nursing home notify abnormal lab work which patient was transferred to [facility] ED. Patient is chronic critical ill with PEG tube for nutritional, divers colostomy bag for stage III decubitus ulcer, nephrostomy tube drain bright red blood. Patient living in nursing home, denies alcohol use, tobacco use. Patient was admitted and managed for ALVIN secondary to dehydration and possible obstruction. ED work-up: Afebrile with BP elevate. BMP show hypokalemia with potassium 5.4, serum osmolarity elevate 322.1. EKG showed sinus tachycardia with PVC, St & T wave abnormality. Patient is asymptomatic for chest pain. CBC showed anemia with hemoglobin 7.5 which reduced compared to her baseline at 8 on 9.      PAST MEDICAL HISTORY:    Past Medical History:   Diagnosis Date    CHF (congestive heart failure) ([facility])     Dr. Webb Carry Gout attack     Hypertension     Osteoarthritis     Pulmonary artery hypertension (HCC)     [facility]-- Dr. Harman [place]-- Dr. Janina Bellamy Renal calculi     Dr. Selena Navarro Thrombocytopenia [facility])      PAST SURGICAL HISTORY:    Past Surgical History:   Procedure Laterality Date    APPENDECTOMY  1960    BRONCHOSCOPY N/A 11/22/2021    BRONCHOSCOPY performed by Norman Durant MD at [facility] N/A 11/30/2021    BRONCHOSCOPY WITHOUT FLURO performed by Norman Durant MD at [facility]    COLECTOMY N/A 1/27/2022    LAPAROSCOPY WITH DIVERTING LOOP COLOSTOMY performed by Matthew Putnam MD at [place] [address] N/A 11/24/2021    EGD PEG TUBE PLACEMENT performed by Ching Dutta MD at [facility]    IR [address]  11/26/2021    IR CHEST TUBE INSERTION 11/26/2021 [facility]    IR NEPHROSTOMY PERCUTANEOUS LEFT  11/1/2021    IR NEPHROSTOMY PERCUTANEOUS LEFT 11/1/2021 Sammi Yeager MD [facility]    IR NEPHROSTOMY PERCUTANEOUS LEFT  3/14/2022    IR NEPHROSTOMY PERCUTANEOUS LEFT 3/14/2022 Edwin Pimentel MD [facility]    PRESSURE ULCER DEBRIDEMENT Right 8/6/2021    EXCISION RIGHT BUTTOCK WOUND AND CLOSURE performed by Dallas Lopez MD at [address]:    Family History   Problem Relation Age of Onset    Diabetes Father    Lata Calabrese Arthritis Mother     COPD Mother     Diabetes Sister     Heart Disease Maternal Uncle     Breast Cancer Niece 36    Sleep Apnea Brother     Asthma Neg Hx     Birth Defects Neg Hx     Cancer Neg Hx     Depression Neg Hx     Early Death Neg Hx     Hearing Loss Neg Hx     High Blood Pressure Neg Hx     High Cholesterol Neg Hx     Kidney Disease Neg Hx     Learning Disabilities Neg Hx     Mental Illness Neg Hx     Mental Retardation Neg Hx     Miscarriages / Stillbirths Neg Hx     Stroke Neg Hx     Substance Abuse Neg Hx     Vision Loss Neg Hx     Other Neg Hx      SOCIAL HISTORY:    Social History     Socioeconomic History    Marital status: Single     Spouse name: Not on file    Number of children: 0    Years of education: Not on file    Highest education level: Not on file   Occupational History    Not on file   Tobacco Use    Smoking status: Never Smoker    Smokeless tobacco: Never Used   Vaping Use    Vaping Use: Never used   Substance and Sexual Activity    Alcohol use: No    Drug use: No    Sexual activity: Not Currently   Other Topics Concern    Not on file   Social History Narrative    Not on file     Social Determinants of Health     Financial Resource Strain: Low Risk     Difficulty of Paying Living Expenses: Not very hard   Food Insecurity: No Food Insecurity    Worried About Running Out of Food in the Last Year: Never true    Traci of Food in the Last Year: Never true   Transportation Needs:     Lack of Transportation (Medical): Not on file    Lack of Transportation (Non-Medical):  Not on file   Physical Activity:     Days of Exercise per Week: Not on file    Minutes of Exercise per Session: Not on file   Stress:     Feeling of Stress : Not on file   Social Connections:     Frequency of Communication with Friends and Family: Not on file    Frequency of Social Gatherings with Friends and Family: Not on file    Attends Religion Services: Not on file    Active Member of [address] [place] [address] [employer] or Organizations: Not on file    Attends Club or Organization Meetings: Not on file    Marital Status: Not on file   Intimate Partner Violence:     Fear of Current or Ex-Partner: Not on file    Emotionally Abused: Not on file    Physically Abused: Not on file    Sexually Abused: Not on file   Housing Stability:     Unable to Pay for Housing in the Last Year: Not on file    Number of [place] in the Last Year: Not on file    Unstable Housing in the Last Year: Not on file     MEDICATIONS:   Prior to Admission medications Medication Sig Start Date End Date Taking? Authorizing Provider   bumetanide (BUMEX) 0.5 MG tablet Take 0.5 mg by mouth daily    Historical Provider, MD   metoclopramide (REGLAN) 5 MG tablet Take 5 mg by mouth every 8 hours    Historical Provider, MD   sodium chloride 0.9 % SOLN Inject as directed Flush TID nephrostomy tube 10ml. Historical Provider, MD   miconazole (MICOTIN) 2 % powder Apply topically daily as needed for Itching Apply topically 2 times daily. Historical Provider, MD   silver nitrate applicators 84-91 % applicator Apply 1 each topically Once a week on Friday and PRN    Historical Provider, MD   folic acid (FOLVITE) 1 MG tablet Take 1 tablet by mouth daily 2/4/22   Maxwell Flores DO   ferrous sulfate (IRON 325) 325 (65 Fe) MG tablet Take 1 tablet by mouth every other day  Patient taking differently: Take 325 mg by mouth daily (with breakfast)  2/4/22   Cherelle Strong DO   melatonin 3 MG TABS tablet Take 2 tablets by mouth nightly as needed (anxiety, sleep) 2/4/22   Cherelle Strong, DO   famotidine (PEPCID) 20 MG tablet 20 mg by PEG Tube route daily    Historical Provider, MD   hydrOXYzine (ATARAX) 25 MG tablet 25 mg by PEG Tube route every 8 hours as needed for Anxiety  Patient not taking: Reported on 4/7/2022    Historical Provider, MD   senna (SENOKOT) 8.8 MG/5ML SYRP syrup Take 5 mLs by mouth nightly as needed    Historical Provider, MD   metoprolol tartrate (LOPRESSOR) 25 MG tablet Take 0.5 tablets by mouth 2 times daily 10/25/21   Shara Alpers, APRN - CNP   sodium hypochlorite (DAKINS) 0.125 % SOLN external solution Apply Dakin's moistened gauze dressings to wound twice daily and as needed.  8/24/21   LANE Rainey CNP   sodium chloride 1 g tablet Take 1 tablet by mouth 2 times daily 8/3/21   Ovidio Hammonds MD   FLUoxetine (PROZAC) 40 MG capsule Take 1 capsule by mouth daily 7/16/21   Amaury Gallagher MD   Multiple Vitamins-Minerals (MULTIVITAMIN WOMEN PO) Take 1 tablet by mouth daily    Historical Provider, MD   acetaminophen (TYLENOL) 650 MG extended release tablet Take 650 mg by mouth every 8 hours as needed for Pain    Historical Provider, MD   Riociguat (ADEMPAS) 2.5 MG TABS Take 2.5 mg by mouth 3 times daily  Patient not taking: Reported on 4/7/2022    Historical Provider, MD   docusate sodium (COLACE) 100 MG capsule Take 100 mg by mouth as needed     Historical Provider, MD   aspirin 81 MG tablet Take 81 mg by mouth daily     Historical Provider, MD       ALLERGIES: Patient has no known allergies. REVIEW OF SYSTEM:   Constitutional:  Negative for appetite change, chills, diaphoresis, fever and unexpected weight change. HENT: Negative for congestion, dental problem, ear discharge, mouth sores, nosebleeds, sinus pain, tinnitus and trouble swallowing. Respiratory: Negative for choking, chest tightness, wheezing and stridor, SOB, cough  Cardiovascular: Negative for chest pain and palpitations, leg swelling  Gastrointestinal: Positive for abdominal distention. Negative for abdominal pain, anal bleeding, blood in stool, constipation, diarrhea, nausea and vomiting. Genitourinary: Negative for dysuria, flank pain, hematuria and urgency. Musculoskeletal: Negative for back pain, gait problem, neck pain and neck stiffness. Skin: Negative for color change, pallor, rash and wound. Neurological: Negative for dizziness, tremors, seizures, syncope, speech difficulty, numbness and headaches. Hematological: Negative for adenopathy. Does not bruise/bleed easily. Psychiatric/Behavioral: Negative for agitation, behavioral problems, confusion, decreased concentration, dysphoric mood and hallucinations. OBJECTIVE  PHYSICAL EXAM:   BP (!) 125/57   Pulse 88   Temp 99.4 °F (37.4 °C) (Oral)   Resp 17   Ht 4' 9\" (1.448 m)   Wt 134 lb (60.8 kg)   SpO2 98%   BMI 29.00 kg/m²   Vitals reviewed. Constitutional:       General: He is not in acute distress.      Appearance: Normal appearance. He is normal weight. He is not ill-appearing, toxic-appearing or diaphoretic. HENT:      Head: Normocephalic and atraumatic. Mouth: Mucous membranes are moist.      Pharynx: Oropharynx is clear. Neck:      Vascular: No carotid bruit. No JVD. Cardiovascular:      Rate and Rhythm: Normal rate and regular rhythm. Pulses: Normal pulses. Heart sounds: Normal heart sounds. No murmur heard. No friction rub. No gallop. Pulmonary:      Effort: No respiratory distress. Breath sounds: No stridor, rales, wheezing or rhonchi. Chest:      Chest wall: No tenderness. Abdominal:      General: Abdomen is flat. Bowel sounds are normal. There is no distension. Palpations: Abdomen is soft. There is no mass. Tenderness: There is no abdominal tenderness. There is no guarding or rebound. PEG tube in place with area dry and clean. Nephrotomy tube drainage bright red blood. Ostomy bag with area dry and clean with soft stool output. Hernia: No hernia is present. Musculoskeletal:         General: No swelling, tenderness, deformity or signs of injury. Cervical back: Normal range of motion and neck supple. Right lower leg: No edema. Left lower leg: No edema. Neurologic:  Alert and oriented x 3. Answers questions appropriately. No obvious focal deficits. Psychiatric:  Thought content, insight and judgment appear normal.    DATA:     Diagnostic tests reviewed for today's visit:    Most recent labs and imaging results reviewed.      LABS:    Recent Results (from the past 24 hour(s))   EKG Emergency    Collection Time: 04/11/22  8:28 PM   Result Value Ref Range    Ventricular Rate 87 BPM    Atrial Rate 87 BPM    P-R Interval 174 ms    QRS Duration 88 ms    Q-T Interval 408 ms    QTc Calculation (Bazett) 490 ms    P Axis 51 degrees    R Axis 84 degrees    T Axis 65 degrees   CBC with Auto Differential    Collection Time: 04/11/22  8:30 PM   Result Value Ref Range    WBC 10.7 4.8 - 10.8 thou/mm3    RBC 2.67 (L) 4.20 - 5.40 mill/mm3    Hemoglobin 7.5 (L) 12.0 - 16.0 gm/dl    Hematocrit 25.2 (L) 37.0 - 47.0 %    MCV 94.4 81.0 - 99.0 fL    MCH 28.1 26.0 - 33.0 pg    MCHC 29.8 (L) 32.2 - 35.5 gm/dl    RDW-CV 16.4 (H) 11.5 - 14.5 %    RDW-SD 56.9 (H) 35.0 - 45.0 fL    Platelets 263 746 - 624 thou/mm3    MPV 8.5 (L) 9.4 - 12.4 fL    Seg Neutrophils 81.1 %    Lymphocytes 7.4 %    Monocytes 8.2 %    Eosinophils 2.2 %    Basophils 0.4 %    Immature Granulocytes 0.7 %    Segs Absolute 8.7 (H) 1.8 - 7.7 thou/mm3    Lymphocytes Absolute 0.8 (L) 1.0 - 4.8 thou/mm3    Monocytes Absolute 0.9 0.4 - 1.3 thou/mm3    Eosinophils Absolute 0.2 0.0 - 0.4 thou/mm3    Basophils Absolute 0.0 0.0 - 0.1 thou/mm3    Immature Grans (Abs) 0.07 0.00 - 0.07 thou/mm3    nRBC 0 /100 wbc   Basic Metabolic Panel    Collection Time: 04/11/22  8:30 PM   Result Value Ref Range    Sodium 134 (L) 135 - 145 meq/L    Potassium 5.4 (H) 3.5 - 5.2 meq/L    Chloride 99 98 - 111 meq/L    CO2 18 (L) 23 - 33 meq/L    Glucose 128 (H) 70 - 108 mg/dL     (H) 7 - 22 mg/dL    CREATININE 4.9 (HH) 0.4 - 1.2 mg/dL    Calcium 9.2 8.5 - 10.5 mg/dL   Hepatic Function Panel    Collection Time: 04/11/22  8:30 PM   Result Value Ref Range    Albumin 2.5 (L) 3.5 - 5.1 g/dL    Total Bilirubin <0.2 (L) 0.3 - 1.2 mg/dL    Bilirubin, Direct <0.2 0.0 - 0.3 mg/dL    Alkaline Phosphatase 79 38 - 126 U/L    AST 25 5 - 40 U/L    ALT 25 11 - 66 U/L    Total Protein 7.0 6.1 - 8.0 g/dL   Troponin    Collection Time: 04/11/22  8:30 PM   Result Value Ref Range    Troponin T 0.222 (A) ng/ml   Anion Gap    Collection Time: 04/11/22  8:30 PM   Result Value Ref Range    Anion Gap 17.0 (H) 8.0 - 16.0 meq/L   Glomerular Filtration Rate, Estimated    Collection Time: 04/11/22  8:30 PM   Result Value Ref Range    Est, Glom Filt Rate 9 (A) ml/min/1.73m2   Osmolality    Collection Time: 04/11/22  8:30 PM   Result Value Ref Range    Osmolality Calc 322.1 (H) 275.0 - 300.0 mOsmol/kg       IMAGING:  No results found.     VTE Prophylaxis: SCDs      Plan of care discussed with: patient and family    SIGNATURE: Chino Bell DO  DATE: April 12, 2022  TIME: 2:24 AM   Pernell Alexandra MD  - supervising physician

## 2022-04-12 NOTE — PROGRESS NOTES
Comprehensive Nutrition Assessment    Type and Reason for Visit:  Initial,Consult,Positive Nutrition Screen,Wound (poor appetite, tubefeeding prior to admit at ECF, wound, dysphagia, hypoalbuminemia, PEG)    Nutrition Recommendations/Plan:   -When able recommend resume tubefeeding: Nepro at 20 ml/hr, if tolerated after 4 hours advance to goal 40 ml/hr.  -Due to current renal function would recommend hold protein modular (was getting 1 oz prostat at [facility] daily). -Free water flush per Nephrology (at [facility] was 300 mls every 8 hours). -Diet prior to admit: Minced and Moist with thin liquids (pt ate only a few bites at meals, TF main source of nutrition). Nutrition Assessment:    Pt. nutritionally compromised AEB NPO status. At risk for further nutrition compromise r/t increased nutrient needs to support wound healing, dysphagia, poor oral intake prior to admit thus was on supplemental EN, admit with ALVIN on CKD, blood from nephrostomy tube (obstructive staghorn calculus), anemia, chronic CHF, and underlying medical condition (hx: previous lengthy hospital course: [facility] admit 10/27 - 12/1/21 for pneumonia then TT [place], then back to [facility] with eventual discharged to [facility]; vent/trach-(removed), dysphagia d/t prolonged intubations, PEG 11/24/21, hx HD (none since 12/23/21), wound debridements, 1/27/22: diverting colostomy, renal calculi, left nephrostomy tubes, HTN, OA, depression, thrombocytopenia, gout attack, CHF, ECF reported Covid in 10/2021). Malnutrition Assessment:  Malnutrition Status: At risk for malnutrition (Comment)    Context:  Chronic Illness     Findings of the 6 clinical characteristics of malnutrition:  Energy Intake:  No significant decrease in energy intake (Patient has been receiving tubefeedings since 11/24/21, meeting projected nutritional needs and tolerating at [facility] prior to admit)  Weight Loss:   (Hard to assess with CHF, hx of HD (none since 12/23/21).   Note relatively stable weight since 1/20/22, note weight down~ 18.1% in the last 11 months)     Body Fat Loss:  No significant body fat loss     Muscle Mass Loss:  1 - Mild muscle mass loss Temples (temporalis)  Fluid Accumulation:  1 - Mild Extremities   Strength:  Not Performed    Estimated Daily Nutrient Needs:  Energy (kcal):  ~ 5467-5180 kcals (18-20 kcals/kg/day); Weight Used for Energy Requirements:   (67 kg)     Protein (g):  42 grams (1 gram/kg/day) or more pending renal/wound status; Weight Used for Protein Requirements:  Ideal (42 kg (adjusted IBW for low stature))        Fluid (ml/day):  1000-0783 mls (25-30 mls/kg/day), hx: CHF, previous HD (none since 12/23/21); Nutrition Related Findings:       Pt. Report/Treatments/Miscellaneous: Patient seen earlier this morning, no family present. Delayed responses to questions asked. Reports poor appetite prior to admit with supplemental tubefeedings. Denied any belly pain or nausea at present. Per RN planning to give patient unit of blood today. Called ECF, reports extended hospital stay starting in 10/2021 and returned back to them ~ 2/2022. States she is on a diet but eats only a few bites if that at meals with much encouragement, often gets \"gaggy at site or smell of food\". Needs a lot of encouragement to do anything including therapy, etc.  Had been tolerating tubefeedings. GI Status: colostomy, 90 mls output.   Patient denied any abdominal pain or nausea  Pertinent Labs: 4/11/22: Sodium 134, Potassium 5.3, , Creatinine 4.9, Glucose 128. 4/12/22: elevated troponin, Hgb 6.8, albumin 2.4-NOT a reliable indicator of nutritional status  Pertinent Meds: IVF at 100 ml/hr, lokelma, bumex-held, pepcid, iron, prozac      Wounds:   (stage 4 sacrum, right and left second toe blisters)       Current Nutrition Therapies:    Diet NPO  Current Tube Feeding (TF) Recommendations:  · Feeding Route: PEG (Placed 11/24/21)  · Formula: Renal Formula (Nepro)  · Schedule: Continuous  · Current TF & Flush Orders Provides: At [facility]: Nepro at 40 ml/hr x 24 hours/day, 1 oz prostat bolused daily, 300 mls free water flush every 8 hours~ 1799 kcals (1699 TF, 100 prostat), 93 grams protein (78 TF, 15 prostat), 164 grams CHO (154 TF, 10 prostat), 24 grams fiber, 1890 mls volume (960 TF, 30 prostat, 900 flushes) per 24 hours  · Recommended TF & Flush Orders Provides: Due to renal status recommend Nepro goal 40 ml/hr (hold protein modular)~ 1699 kcals, 78 grams protein, 154 grams CHO, 24 grams, 698 mls water/960 mls volume. Free water per Nephrology at [facility] was 300 mls every 8 hours. Anthropometric Measures:  · Height: 4' 9\" (144.8 cm)  · Current Body Weight: 147 lb 7.8 oz (66.9 kg) (4/12/22 nonpitting LE edema)   · Admission Body Weight: 134 lb (60.8 kg) (4/11/22 no edema)    · Usual Body Weight:  (Patient \"not sure\". Per EMR: 5/10/21: 180#3. 2oz, 10/17/21: 164#, 1/20/22: 144#13.5oz, 3/13/22: 133# bedscale. Per ECF weights: 2/4/22: 144#, 2/15/22: 135.5#, 3/1/22: 133#, 4/5/22: 133#,4/10/22: 148.7#)     · BMI: 31.9  · Adjusted Body Weight:  ;  (Adjusted IBW for low stature: 93# (42 kg))   · BMI Categories: Obese Class 1 (BMI 30.0-34. 9)       Nutrition Diagnosis:   · Inadequate oral intake related to inadequate protein-energy intake as evidenced by NPO or clear liquid status due to medical condition,poor intake prior to admission (supplemental EN nutrition support prior to admit)    Nutrition Interventions:   Food and/or Nutrient Delivery:  Continue NPO (When able recommend resume diet and tubefeeding)  Nutrition Education/Counseling:  Education initiated (Discussed NPO status with patient and plans to resume diet and supplemental tubefeedings when able)   Coordination of Nutrition Care:  Continue to monitor while inpatient    Goals:  Patient will receive nutrition support to provide 75% or more of estimated nutrient needs in 1-3 days       Nutrition Monitoring and Evaluation: Behavioral-Environmental Outcomes:  None Identified   Food/Nutrient Intake Outcomes:  Diet Advancement/Tolerance,Food and Nutrient Intake,Enteral Nutrition Intake/Tolerance,IVF Intake  Physical Signs/Symptoms Outcomes:  Biochemical Data,Chewing or Swallowing,GI Status,Fluid Status or Edema,Meal Time Behavior,Nutrition Focused Physical Findings,Skin,Weight     Discharge Planning:     Too soon to determine     Electronically signed by Ethel Arriaza RD, LD on 4/12/22 at 10:43 AM EDT    Contact: [phone]

## 2022-04-12 NOTE — PROGRESS NOTES
Spoke with patient's nurse Summer and informed her that Dr Anders Stephens reviewed that patient's case and he needs a CT with contrast first before proceeding with the procedure.

## 2022-04-12 NOTE — ED NOTES
Pt is resting in cot with eyes closed, respirations even and unlabored. No distress noted, family is at bedside. Will continue to monitor.       Rena Sullivan RN  04/12/22 [id number]

## 2022-04-13 ENCOUNTER — APPOINTMENT (OUTPATIENT)
Dept: INTERVENTIONAL RADIOLOGY/VASCULAR | Age: 72
DRG: 673 | End: 2022-04-13
Payer: MEDICARE

## 2022-04-13 LAB
ALBUMIN SERPL-MCNC: 2.2 G/DL (ref 3.5–5.1)
ANION GAP SERPL CALCULATED.3IONS-SCNC: 20 MEQ/L (ref 8–16)
BASOPHILS # BLD: 0.6 %
BASOPHILS ABSOLUTE: 0.1 THOU/MM3 (ref 0–0.1)
BUN BLDV-MCNC: 157 MG/DL (ref 7–22)
CALCIUM SERPL-MCNC: 8.8 MG/DL (ref 8.5–10.5)
CHLORIDE BLD-SCNC: 106 MEQ/L (ref 98–111)
CO2: 15 MEQ/L (ref 23–33)
CREAT SERPL-MCNC: 5.2 MG/DL (ref 0.4–1.2)
EOSINOPHIL # BLD: 2.2 %
EOSINOPHILS ABSOLUTE: 0.2 THOU/MM3 (ref 0–0.4)
ERYTHROCYTE [DISTWIDTH] IN BLOOD BY AUTOMATED COUNT: 16.1 % (ref 11.5–14.5)
ERYTHROCYTE [DISTWIDTH] IN BLOOD BY AUTOMATED COUNT: 56.3 FL (ref 35–45)
GFR SERPL CREATININE-BSD FRML MDRD: 8 ML/MIN/1.73M2
GLUCOSE BLD-MCNC: 136 MG/DL (ref 70–108)
GLUCOSE BLD-MCNC: 60 MG/DL (ref 70–108)
GLUCOSE BLD-MCNC: 69 MG/DL (ref 70–108)
GLUCOSE BLD-MCNC: 88 MG/DL (ref 70–108)
GLUCOSE BLD-MCNC: 93 MG/DL (ref 70–108)
HCT VFR BLD CALC: 27.6 % (ref 37–47)
HCT VFR BLD CALC: 28.1 % (ref 37–47)
HCT VFR BLD CALC: 28.3 % (ref 37–47)
HEMOGLOBIN: 8.3 GM/DL (ref 12–16)
HEMOGLOBIN: 8.4 GM/DL (ref 12–16)
HEMOGLOBIN: 8.5 GM/DL (ref 12–16)
IMMATURE GRANS (ABS): 0.06 THOU/MM3 (ref 0–0.07)
IMMATURE GRANULOCYTES: 0.6 %
LYMPHOCYTES # BLD: 8.8 %
LYMPHOCYTES ABSOLUTE: 1 THOU/MM3 (ref 1–4.8)
MAGNESIUM: 1.7 MG/DL (ref 1.6–2.4)
MCH RBC QN AUTO: 28.4 PG (ref 26–33)
MCHC RBC AUTO-ENTMCNC: 29.9 GM/DL (ref 32.2–35.5)
MCV RBC AUTO: 94.9 FL (ref 81–99)
MONOCYTES # BLD: 7.3 %
MONOCYTES ABSOLUTE: 0.8 THOU/MM3 (ref 0.4–1.3)
NUCLEATED RED BLOOD CELLS: 0 /100 WBC
PHOSPHORUS: 8.3 MG/DL (ref 2.4–4.7)
PLATELET # BLD: 249 THOU/MM3 (ref 130–400)
PMV BLD AUTO: 8.6 FL (ref 9.4–12.4)
POTASSIUM SERPL-SCNC: 5.3 MEQ/L (ref 3.5–5.2)
RBC # BLD: 2.96 MILL/MM3 (ref 4.2–5.4)
SEG NEUTROPHILS: 80.5 %
SEGMENTED NEUTROPHILS ABSOLUTE COUNT: 8.7 THOU/MM3 (ref 1.8–7.7)
SODIUM BLD-SCNC: 141 MEQ/L (ref 135–145)
WBC # BLD: 10.8 THOU/MM3 (ref 4.8–10.8)

## 2022-04-13 PROCEDURE — 80069 RENAL FUNCTION PANEL: CPT

## 2022-04-13 PROCEDURE — 83735 ASSAY OF MAGNESIUM: CPT

## 2022-04-13 PROCEDURE — 6360000002 HC RX W HCPCS: Performed by: PHYSICIAN ASSISTANT

## 2022-04-13 PROCEDURE — 2709999900 IR GUIDED NEPHROSTOGRAM/URETEROGRAM EXISTING ACCESS

## 2022-04-13 PROCEDURE — 99232 SBSQ HOSP IP/OBS MODERATE 35: CPT | Performed by: NURSE PRACTITIONER

## 2022-04-13 PROCEDURE — 2500000003 HC RX 250 WO HCPCS: Performed by: INTERNAL MEDICINE

## 2022-04-13 PROCEDURE — 1200000000 HC SEMI PRIVATE

## 2022-04-13 PROCEDURE — 92610 EVALUATE SWALLOWING FUNCTION: CPT

## 2022-04-13 PROCEDURE — 82948 REAGENT STRIP/BLOOD GLUCOSE: CPT

## 2022-04-13 PROCEDURE — 2580000003 HC RX 258: Performed by: INTERNAL MEDICINE

## 2022-04-13 PROCEDURE — 99233 SBSQ HOSP IP/OBS HIGH 50: CPT | Performed by: INTERNAL MEDICINE

## 2022-04-13 PROCEDURE — 6370000000 HC RX 637 (ALT 250 FOR IP): Performed by: STUDENT IN AN ORGANIZED HEALTH CARE EDUCATION/TRAINING PROGRAM

## 2022-04-13 PROCEDURE — BT121ZZ FLUOROSCOPY OF LEFT KIDNEY USING LOW OSMOLAR CONTRAST: ICD-10-PCS | Performed by: RADIOLOGY

## 2022-04-13 PROCEDURE — 2580000003 HC RX 258: Performed by: PHYSICIAN ASSISTANT

## 2022-04-13 PROCEDURE — 85014 HEMATOCRIT: CPT

## 2022-04-13 PROCEDURE — 99233 SBSQ HOSP IP/OBS HIGH 50: CPT | Performed by: PHYSICIAN ASSISTANT

## 2022-04-13 PROCEDURE — 92526 ORAL FUNCTION THERAPY: CPT

## 2022-04-13 PROCEDURE — 85018 HEMOGLOBIN: CPT

## 2022-04-13 PROCEDURE — 50431 NJX PX NFROSGRM &/URTRGRM: CPT

## 2022-04-13 PROCEDURE — 2580000003 HC RX 258: Performed by: STUDENT IN AN ORGANIZED HEALTH CARE EDUCATION/TRAINING PROGRAM

## 2022-04-13 PROCEDURE — 36415 COLL VENOUS BLD VENIPUNCTURE: CPT

## 2022-04-13 PROCEDURE — 85025 COMPLETE CBC W/AUTO DIFF WBC: CPT

## 2022-04-13 RX ORDER — DEXTROSE MONOHYDRATE 50 MG/ML
100 INJECTION, SOLUTION INTRAVENOUS PRN
Status: DISCONTINUED | OUTPATIENT
Start: 2022-04-13 | End: 2022-04-28 | Stop reason: HOSPADM

## 2022-04-13 RX ORDER — CIPROFLOXACIN 2 MG/ML
400 INJECTION, SOLUTION INTRAVENOUS EVERY 12 HOURS
Status: DISCONTINUED | OUTPATIENT
Start: 2022-04-13 | End: 2022-04-18

## 2022-04-13 RX ORDER — DEXTROSE MONOHYDRATE 25 G/50ML
12.5 INJECTION, SOLUTION INTRAVENOUS PRN
Status: DISCONTINUED | OUTPATIENT
Start: 2022-04-13 | End: 2022-04-13 | Stop reason: CLARIF

## 2022-04-13 RX ORDER — NICOTINE POLACRILEX 4 MG
15 LOZENGE BUCCAL PRN
Status: DISCONTINUED | OUTPATIENT
Start: 2022-04-13 | End: 2022-04-13 | Stop reason: CLARIF

## 2022-04-13 RX ADMIN — DEXTROSE MONOHYDRATE 125 ML: 100 INJECTION, SOLUTION INTRAVENOUS at 08:30

## 2022-04-13 RX ADMIN — METOPROLOL TARTRATE 12.5 MG: 25 TABLET, FILM COATED ORAL at 20:31

## 2022-04-13 RX ADMIN — ACETAMINOPHEN 650 MG: 325 TABLET ORAL at 20:33

## 2022-04-13 RX ADMIN — METOPROLOL TARTRATE 12.5 MG: 25 TABLET, FILM COATED ORAL at 08:21

## 2022-04-13 RX ADMIN — SODIUM BICARBONATE: 84 INJECTION, SOLUTION INTRAVENOUS at 10:08

## 2022-04-13 RX ADMIN — SODIUM CHLORIDE, PRESERVATIVE FREE 10 ML: 5 INJECTION INTRAVENOUS at 08:22

## 2022-04-13 RX ADMIN — CIPROFLOXACIN 400 MG: 2 INJECTION, SOLUTION INTRAVENOUS at 12:15

## 2022-04-13 RX ADMIN — FLUOXETINE 40 MG: 20 CAPSULE ORAL at 08:21

## 2022-04-13 RX ADMIN — FAMOTIDINE 20 MG: 20 TABLET ORAL at 08:21

## 2022-04-13 RX ADMIN — CIPROFLOXACIN 400 MG: 2 INJECTION, SOLUTION INTRAVENOUS at 23:13

## 2022-04-13 RX ADMIN — SODIUM CHLORIDE: 9 INJECTION, SOLUTION INTRAVENOUS at 12:13

## 2022-04-13 RX ADMIN — SODIUM BICARBONATE: 84 INJECTION, SOLUTION INTRAVENOUS at 21:44

## 2022-04-13 ASSESSMENT — PAIN SCALES - GENERAL
PAINLEVEL_OUTOF10: 2
PAINLEVEL_OUTOF10: 0
PAINLEVEL_OUTOF10: 2
PAINLEVEL_OUTOF10: 0
PAINLEVEL_OUTOF10: 0

## 2022-04-13 NOTE — PROGRESS NOTES
Spoke with [employer] in [employer], patient to be kept npo for possible nephrostogram. Will ff up

## 2022-04-13 NOTE — CARE COORDINATION
4/13/22, 1:55 PM EDT    DISCHARGE ON GOING EVALUATION    Bridget Yana day: 1  Location: [facility]18/018-A Reason for admit: ALVIN (acute kidney injury) ([facility] [address] [N17.9]  Acute renal failure superimposed on chronic kidney disease, unspecified CKD stage, unspecified acute renal failure type ([facility] [address] [N17.9, N18.9]   Procedure: none  Barriers to Discharge: K+5.3, CO2 15, creat 5.2, albumin 2.2, Hgb 8.5. Planning nephrostogram  and MBS. Bicarb gtt, IV cipro. PCP: Molly Lucas MD  Readmission Risk Score: 26.5 ( )%  Patient Goals/Plan/Treatment Preferences: Return to the [place] [id number].

## 2022-04-13 NOTE — PROGRESS NOTES
[facility]         Renal Inpatient Follow-Up note         4/13/2022 8:44 AM    Pt Name:   Quiana Vergara  YOB: 1950  Attending:   Apolonia Thomas PA-C    Chief Complaint : Quiana Vergara is a 73 y.o. 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 No distress  Feels ok. No cp or SOB. Still with blood in the nephrostomy tube. Poor historian  Patient had almost 1250 mL of urine yesterday     Scheduled Medications :    [Held by provider] aspirin  81 mg Oral Daily    [Held by provider] bumetanide  0.5 mg Oral Daily    famotidine  20 mg PEG Tube Every Other Day    ferrous sulfate  325 mg Oral Every Other Day    FLUoxetine  40 mg Oral Daily    metoprolol tartrate  12.5 mg Oral BID    sodium chloride flush  5-40 mL IntraVENous 2 times per day      dextrose      sodium chloride      sodium chloride         Vitals :  BP (!) 140/68   Pulse 102   Temp 97.9 °F (36.6 °C) (Oral)   Resp 16   Ht 4' 9\" (1.448 m)   Wt 147 lb (66.7 kg)   SpO2 92%   BMI 31.81 kg/m²     24HR INTAKE/OUTPUT:      Intake/Output Summary (Last 24 hours) at 4/13/2022 0844  Last data filed at 4/13/2022 0813  Gross per 24 hour   Intake 1511.71 ml   Output 1500 ml   Net 11.71 ml     Last 3 weights  Wt Readings from Last 3 Encounters:   04/13/22 147 lb (66.7 kg)   03/29/22 134 lb (60.8 kg)   03/13/22 133 lb (60.3 kg)           Physical Exam :  General -- well developed and well nourished  HEENT-normal external appearance of both ears and nose. Mouth/throat  - oropharynx appears to be clear and moist  Eyes-conjunctiva normal no icterus or pallor. Neck-normal range of motion supple no JVD.   Lungs -- clear  Heart -- S1, S2 heard, JVD - no  Abdomen - soft, non-tender  Extremities -- edema --no significant edema noted  CNS - awake and alert  Psychiatric-mood and memory appears normal         Last 3 CBC   Recent Labs     04/11/22  2030 04/12/22  2147 04/12/22  1628 04/12/22 1917 04/13/22  0337   WBC 10.7  --   --   --  10.8   RBC 2.67*  --   --   --  2.96*   HGB 7.5*   < > 8.3* 8.2* 8.4*   HCT 25.2*   < > 28.1* 27.4* 28.1*     --   --   --  249    < > = values in this interval not displayed. Last 3 CMP  Recent Labs     04/11/22 2030 04/12/22 0224 04/12/22  0832 04/12/22  1628 04/13/22  0337   *  --  136  --  141   K 5.4*   < > 5.3*  5.4* 5.3* 5.3*   CL 99  --  102  --  106   CO2 18*  --  17*  --  15*   *  --  160*  --  157*   CREATININE 4.9*  --  4.9*  --  5.2*   CALCIUM 9.2  --  8.8  --  8.8   LABALBU 2.5*  --  2.4*  --  2.2*   BILITOT <0.2*  --  <0.2*  --   --     < > = values in this interval not displayed. ASSESSMENT / Plan   1. Renal -acute kidney injury on chronic kidney disease stage III exact etiology is not clear however it appears most likely some component of volume depletion and the use of diuretic. ? Renal ultrasound scan does not show any acute abnormalities. ? Not much change he now BUN and creatinine. ? However patient made almost 1200 mL of urine  ? We will continue aggressive IV hydration no acute need for renal replacement therapy  ? However if numbers does not improve in the next couple of days might need renal replacement therapy     2. Electrolytes -mild hyperkalemia hydrate and monitor, the bicarbonate should help  3. Mild acidosis secondary to renal dysfunction start sodium bicarbonate infusion  4. Anemia probably due to bleeding through the nephrostomy tube. Status post blood transfusion. 5. Bleeding from the nephrostomy tube urology has been consulted will be having a nephrostogram done today  6. Hx of chronic diastolic congestive heart failure appears to be well compensated hold diuretics for now  7. Essential hypertension-stable  8. Meds reviewed and discussed with patient and nursing staff      Dr. Neris Blount MD, M,D.  [facility].

## 2022-04-13 NOTE — PROGRESS NOTES
1622 Procedure started with Dr. Isabel Mcelroy. 1626 Nephrostogram procedure completed; patient tolerated well. Dressing to left flank area changed; no bleeding noted. 1634 Patient on bed; comfort ensured. Warm blankets given. [phone] Patient taken to 6K via bed.

## 2022-04-13 NOTE — PROGRESS NOTES
Per Mary IBARRA RN, neph tube working. Was clotted per dr Addie Calero. Awaiting orders for prn flushes. neph tube dry intact no drainage.  wctm    Ok to eat per [facility]

## 2022-04-13 NOTE — PROGRESS NOTES
Urology Progress Note    Chief Complaint: Abnormal lab, blood in nephrostomy tube  Reason for consultation:  Gross hematuria from L nephrostomy tube. Subjective:     Pt resting in bed. Denies pain. Nephrostomy tube with brown/red and some yellow in tubing urine with dark red/brownish urine in bag. Sister at bedside. Clear hickman urine in seaman bag. Vitals:  BP (!) 140/68   Pulse 102   Temp 98 °F (36.7 °C) (Oral)   Resp 16   Ht 4' 9\" (1.448 m)   Wt 147 lb (66.7 kg)   SpO2 92%   BMI 31.81 kg/m²   Temp  Av.8 °F (36.6 °C)  Min: 97.5 °F (36.4 °C)  Max: 98.2 °F (36.8 °C)    Intake/Output Summary (Last 24 hours) at 2022 1039  Last data filed at 2022 0813  Gross per 24 hour   Intake 1511.71 ml   Output 1500 ml   Net 11.71 ml       Social History     Socioeconomic History    Marital status: Single     Spouse name: Not on file    Number of children: 0    Years of education: Not on file    Highest education level: Not on file   Occupational History    Not on file   Tobacco Use    Smoking status: Never Smoker    Smokeless tobacco: Never Used   Vaping Use    Vaping Use: Never used   Substance and Sexual Activity    Alcohol use: No    Drug use: No    Sexual activity: Not Currently   Other Topics Concern    Not on file   Social History Narrative    Not on file     Social Determinants of Health     Financial Resource Strain: Low Risk     Difficulty of Paying Living Expenses: Not very hard   Food Insecurity: No Food Insecurity    Worried About Running Out of Food in the Last Year: Never true    Traci of Food in the Last Year: Never true   Transportation Needs:     Lack of Transportation (Medical): Not on file    Lack of Transportation (Non-Medical):  Not on file   Physical Activity:     Days of Exercise per Week: Not on file    Minutes of Exercise per Session: Not on file   Stress:     Feeling of Stress : Not on file   Social Connections:     Frequency of Communication with Friends and Family: Not on file    Frequency of Social Gatherings with Friends and Family: Not on file    Attends Scientologist Services: Not on file    Active Member of Clubs or Organizations: Not on file    Attends Club or Organization Meetings: Not on file    Marital Status: Not on file   Intimate Partner Violence:     Fear of Current or Ex-Partner: Not on file    Emotionally Abused: Not on file    Physically Abused: Not on file    Sexually Abused: Not on file   Housing Stability:     Unable to Pay for Housing in the Last Year: Not on file    Number of Places Lived in the Last Year: Not on file    Unstable Housing in the Last Year: Not on file     Family History   Problem Relation Age of Onset    Diabetes Father     Arthritis Mother     COPD Mother     Diabetes Sister     Heart Disease Maternal Uncle     Breast Cancer Niece 36    Sleep Apnea Brother     Asthma Neg Hx     Birth Defects Neg Hx     Cancer Neg Hx     Depression Neg Hx     Early Death Neg Hx     Hearing Loss Neg Hx     High Blood Pressure Neg Hx     High Cholesterol Neg Hx     Kidney Disease Neg Hx     Learning Disabilities Neg Hx     Mental Illness Neg Hx     Mental Retardation Neg Hx     Miscarriages / Stillbirths Neg Hx     Stroke Neg Hx     Substance Abuse Neg Hx     Vision Loss Neg Hx     Other Neg Hx      No Known Allergies      Constitutional: Pt sleepy. Arouses to name. HEENT:   Head:         Normocephalic and atraumatic. Mucous membranes are normal.   Eyes:         EOM are normal. No scleral icterus. Nose:    The external appearance of the nose is normal  Ears: The ears appear normal to external inspection. Cardiovascular:       Normal rate, regular rhythm. Pulmonary/Chest:  Normal respiratory rate and rhthym. No use of accessory muscles. Lungs clear bilaterally with diminished bases. Abdominal:          Soft, nontender, hypoactive.   Genitalia:    Padilla catheter draining hickman yellow urine without clots. L nephrostomy tube with dark red/brownish urine in bag  Musculoskeletal:    Normal range of motion. Exhibits no edema or tenderness of lower extremities. Extremities:    No cyanosis, clubbing, or edema present. Neurological:    Alert and oriented. Labs:  WBC:    Lab Results   Component Value Date    WBC 10.8 04/13/2022     Hemoglobin/Hematocrit:    Lab Results   Component Value Date    HGB 8.4 04/13/2022    HCT 28.1 04/13/2022     BMP:    Lab Results   Component Value Date     04/13/2022    K 5.3 04/13/2022    K 4.1 03/14/2022     04/13/2022    CO2 15 04/13/2022     04/13/2022    LABALBU 2.2 04/13/2022    CREATININE 5.2 04/13/2022    CALCIUM 8.8 04/13/2022    LABGLOM 8 04/13/2022       Narrative   PROCEDURE: CT ABDOMEN PELVIS WO CONTRAST       CLINICAL INFORMATION: Left staghorn calculus       TECHNIQUE: CT of the abdomen and pelvis was performed without use of intravenous contrast. Axial images as well as sagittal and coronal reconstructions were obtained.       All CT scans at this facility use dose modulation, iterative reconstruction, and/or weight-based dosing when appropriate to reduce radiation dose to as low as reasonably achievable.       COMPARISON: CT abdomen and pelvis 3/13/2022       FINDINGS:        Lower thorax: There are small bilateral pleural effusions. There is adjacent lung consolidation are present. The heart is enlarged.       Abdomen: Evaluation is limited due to absence of contrast. There is no free intraperitoneal air. A gastrostomy tube is in the stomach. A left mid abdominal ostomy is stable and contains loops of bowel. There is no bowel obstruction. Calcifications are    present in the lumen of the gallbladder. A nephrostomy tube is now present in the left kidney. Fluid adjacent to the tube and kidney has noncontrast mean Hounsfield units of 12 (image 34). Subcapsular fluid measures up to 2.2 cm in width (image 38).     Perinephric stranding is again noted. Calcifications in the left kidney are stable. Left-sided hydronephrosis is not significantly changed. Hypoattenuating lesions in the kidneys may be cysts but are incompletely characterized on the current study. There    are calcified granulomas in the liver and spleen. Atherosclerotic calcifications are present in the abdominal aorta without evidence of aneurysm. There is no mesenteric or retroperitoneal lymphadenopathy. Degenerative changes are seen in the    thoracolumbar spine without evidence of aggressive osseous lesions.       Pelvis: There is a Seaman catheter in a nondistended urinary bladder, likely accounting for gas in the bladder. Platelets are present in the pelvis. There are calcifications in the uterus. There is no pelvic or inguinal lymphadenopathy. There is    persistent subcutaneous tissue irregularity posterior to the sacrum. Degenerative changes are present in the pelvis without evidence of aggressive osseous lesions.           Impression   1. Small bilateral pleural effusions with adjacent atelectasis/infiltrate. 2. Left-sided perinephric stranding and fluid collection adjacent to a left-sided nephrostomy tube, likely a subcapsular hematoma. Stable calcifications in the left kidney. 3. Cholelithiasis. 4. Sacral decubitus ulcer. Impression/plan:    1. Left Staghorn Calculus/Bleeding from Left Nephrostomy Tube- Renal US demonstrates a subcapsular fluid collection adjacent to the left kidney. CT with nephrostomy tube in kidney. IR has been consulted to see if they can assess left nephrostomy tube position and change if needed. If nephrostomy tube needs to be changed, a nephroureteral catheter would be recommended to try to prevent any further dislodging with positional changes. Urine culture pending. Blood in nephrostomy tube improving. Blood in seaman resolved.   Reviewed CT with Dr. Lele Garcia and tube may not be in most ideal position--recommends Nephrostogram.  Discussed with Dr. Bethel Waggoner in IR and will proceed with nephrostogram to evaluate position. Left ESWL has not been scheduled due to patient's continuing medical problems.     2. 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 CT a/p without contrast pending. Likely multifactorial. May be a component of volume depletion and diuretics. Hydration. Nephrology managing.      3. Hyperkalemia- On Lokelma.      4. Acute Blood Loss Anemia- Received blood 4/12/22     5. Chronic Diastolic Heart Failure- Diuretics held.      [facility] [place]  04/13/22 10:39 AM  Urology

## 2022-04-13 NOTE — PLAN OF CARE
Problem: Skin Integrity:  Goal: Will show no infection signs and symptoms  Description: Will show no infection signs and symptoms  4/13/2022 1626 by Mario Shahid RN  Outcome: Ongoing  4/13/2022 0355 by Samuel Celaya  Outcome: Ongoing  Note: turns every two hours, legs elevation, zinc paste as needed.       Problem: Confusion - Acute:  Goal: Absence of continued neurological deterioration signs and symptoms  Description: Absence of continued neurological deterioration signs and symptoms  4/13/2022 1626 by Mario Shahid RN  Outcome: Ongoing  4/13/2022 0355 by Samuel Celaya  Outcome: Ongoing  Note: reorientation of date and month     Problem: Nutrition  Goal: Optimal nutrition therapy  4/13/2022 1626 by Mario Shahid RN  Outcome: Ongoing  4/13/2022 0355 by Samuel Celaya  Outcome: Ongoing  Note: patient has been on npo for nephrostogram, awaiting for clearance after procedure    Reviewed with patient and family at bedside

## 2022-04-13 NOTE — PROGRESS NOTES
[facility]  SPEECH THERAPY  STRZ RENAL TELEMETRY 6K  Clinical Swallow Evaluation   & Treatment       SLP Individual Minutes  Time In: 0477  Time Out: 6289  Minutes: 18  Timed Code Treatment Minutes: 0 Minutes      CSE: 10 minutes   Sw Tx: 8 minutes     Date: 2022  Patient Name: Marilyn Burton      CSN: [id number]   : 1950  (73 y.o.)  Gender: female   Referring Physician:  Keturah De Los Santos PA-C    Diagnosis: ALVIN (acute kidney injury) ([facility] [address]    History of Present Illness/Injury: Per chart review; Marissa Wolff is a 72 y/o female who was admitted to [address] [place] [address] with the above medical dx. \"Kenzie Wong is a 73 y.o.   female with Past Medical History as stated below presented with a chief complaint of Abnormal Lab   on 2022 .     Patient is a poor historian and accurate history and review of systems cannot be obtained from the patient\"    The pt was referred for a CSE d/t hx of dysphagia, PEG tube, and current modified diet at the SNF. Past Medical History:   Diagnosis Date    CHF (congestive heart failure) ([facility])     Dr. Josselyn Gifford Depression     Gout attack     Hypertension     Osteoarthritis     Pulmonary artery hypertension ([facility] [address]     [facility]-- Dr. Kaye Saravia-- Dr. Josselyn Gifford Renal calculi     Dr. Tiburcio Ramos Thrombocytopenia [facility])        SUBJECTIVE:  Patient seen upright in bed with RN permission. The pt is alert, pleasantly confused, delayed reesponses, and has 2 siblings at the bedside (brother and sister). OBJECTIVE:    Pain:  No pain reported. Current Diet: NPO + PEG, but tube feeds have NOT been ordered     Respiratory Status:  Room Air    Behavioral Observation:  Alert and Confused    Oral Mechanism Evaluation:      Facial / Labial WFL    Lingual WFL    Dentition WFL    Velum Not Tested    Vocal Quality WFL    Sensation Not Tested    Cough Not Tested      Patient Evaluated Using:  Ice Chips, Thin Liquids, Puree and Soft Solids    Oral Phase:  Impaired:  Impaired Mastication and Reduced Bolus Formation    Pharyngeal Phase: WFL:  Pharyngeal phase appears WFL but cannot rule out pharyngeal phase deficits from a bedside swallowing evaluation alone. Signs and Symptoms of Laryngeal Penetration/Aspiration: Throat Clear x1 with initial drink     Impressions: Patient presents with mild oral dysphagia with inability to fully discern potential presence of pharyngeal phase deficits without formal instrumentation. The pt was evaluated with ice, tsp, cup, straw, and consecutive function. The pt presented with an immediate throat clear with the first ice chips presentation, however all additional thin liquids trials were completed with no anterior loss, timely swallow onset and no overt s/s of aspiration/penetration // certainly unable to rule out airway invasion at the bedside alone. Completed puree solids, and soft moist solid trials with good success, timely mastication, consistent oral clearance and pt denying sensations of pharyngeal residue. The pt did defer completing dry cassandra crackers d/t concerns that dry hard textures would be difficult to break down and \"get stuck\" re: to the pharyngeal cavity. Recommended to initiation minced & moist solids, thin liquids, with further assessment via MBS to re-assess swallow function since pt has been participating in skilled dysphagia treatment in SNF setting and is eager to upgrade her diet as soon as clinically ready. **Education: Completed skilled education re: the risk of airway invasion, rationale for minced and moist diet vs soft & bite size solids, as well as, recommendations for further assessment via MBS d/t a prolonged time and skilled dysphagia treatment since previous instrumental assessment. The pt, her sister, and her brother verbalized understanding and agreement. *potential IR procedure on 4/13, therefore, hold MBS and diet until 4/14.      RECOMMENDATIONS/ASSESSMENT:  Instrumental Evaluation: Modified Barium Swallow (MBS)  Diet Recommendations:  Minced & moist solids, thin liquids   Strategies:  Set-up with Meals, Full Upright Position, Small Bite/Sip, Medications Whole with Puree, Direct 1:1 Supervision, Alternate Solids and Liquids, Limit Distractions and Monitor for Fatigue   Rehabilitation Potential: good    EDUCATION:  Learner: Patient  Education:  Reviewed results and recommendations of this evaluation, Reviewed diet and strategies, Reviewed ST goals and Plan of Care and Reviewed recommendations for follow-up  Evaluation of Education: Needs further instruction and No evidence of learning    PLAN:  Recommendations pending MBS and Skilled SLP intervention on acute care 3-5 x per week or until goals met and/or pt plateaus in function. Specific interventions for next session may include: monitor cognition, MBS . PATIENT GOAL:    Pt stated she wants to be able to eat what she wants or at least have more options    SHORT TERM GOALS:  Short-term Goals  Timeframe for Short-term Goals: 2 weeks  Goal 1: Patient will safely consume minced & moist solids and thin liquids with no overt s/s of aspiration/penetration to assist with nutrition/hydrational needs. Goal 2: Patient will complete MBS to further assess swallow function since completing intensive dyspahgai treatment for 1-2 months since previous instrumental  Goal 3: will monitor cognition and complete assessment as warranted. LONG TERM GOALS:  No LTM Goals recommended, due to anticipated short ELOS. Werner Ramsey MA., Miriam Fry / [id number]

## 2022-04-13 NOTE — PLAN OF CARE
Problem: Falls - Risk of:  Goal: Will remain free from falls  Description: Will remain free from falls  Outcome: Ongoing  Goal: Absence of physical injury  Description: Absence of physical injury  Outcome: Ongoing  Note: No falls noted this shift. Continue falling star program. Bed alarm on, bed in low position. Call light and personal belongings in reach. Patient uses call light appropriately. Problem: Skin Integrity:  Goal: Will show no infection signs and symptoms  Description: Will show no infection signs and symptoms  Outcome: Ongoing  Goal: Absence of new skin breakdown  Description: Absence of new skin breakdown  Outcome: Ongoing  Note: No new signs or symptoms of skin breakdown noted this shift, encouraging patient to turn and reposition self in bed q2h      Problem: Confusion - Acute:  Goal: Absence of continued neurological deterioration signs and symptoms  Description: Absence of continued neurological deterioration signs and symptoms  Outcome: Ongoing  Goal: Mental status will be restored to baseline  Description: Mental status will be restored to baseline  Outcome: Ongoing  Note: Patient is negative for signs and symptoms of delirium. Problem: Discharge Planning:  Goal: Ability to perform activities of daily living will improve  Description: Ability to perform activities of daily living will improve  Outcome: Ongoing  Goal: Participates in care planning  Description: Participates in care planning  Outcome: Ongoing  Note: Patient plans to be discharged to [place] [address] when medically stable. Problem: Injury - Risk of, Physical Injury:  Goal: Will remain free from falls  Description: Will remain free from falls  Outcome: Ongoing  Goal: Absence of physical injury  Description: Absence of physical injury  Outcome: Ongoing  Note: No falls noted this shift. Continue falling star program. Bed alarm on, bed in low position. Call light and personal belongings in reach.   Patient uses call light appropriately. Problem: Mood - Altered:  Goal: Mood stable  Description: Mood stable  Outcome: Ongoing  Goal: Absence of abusive behavior  Description: Absence of abusive behavior  Outcome: Ongoing  Goal: Verbalizations of feeling emotionally comfortable while being cared for will increase  Description: Verbalizations of feeling emotionally comfortable while being cared for will increase  Outcome: Ongoing  Note: Patient is very labile this evening. Problem: Psychomotor Activity - Altered:  Goal: Absence of psychomotor disturbance signs and symptoms  Description: Absence of psychomotor disturbance signs and symptoms  Outcome: Ongoing  Note: Patient is very somnolent this shift as she was last shift, as reported to me by NOAM Wheeler. Problem: Sensory Perception - Impaired:  Goal: Demonstrations of improved sensory functioning will increase  Description: Demonstrations of improved sensory functioning will increase  Outcome: Ongoing  Goal: Decrease in sensory misperception frequency  Description: Decrease in sensory misperception frequency  Outcome: Ongoing  Goal: Able to refrain from responding to false sensory perceptions  Description: Able to refrain from responding to false sensory perceptions  Outcome: Ongoing  Goal: Demonstrates accurate environmental perceptions  Description: Demonstrates accurate environmental perceptions  Outcome: Ongoing  Goal: Able to distinguish between reality-based and nonreality-based thinking  Description: Able to distinguish between reality-based and nonreality-based thinking  Outcome: Ongoing  Goal: Able to interrupt nonreality-based thinking  Description: Able to interrupt nonreality-based thinking  Outcome: Ongoing     Problem: Sleep Pattern Disturbance:  Goal: Appears well-rested  Description: Appears well-rested  Outcome: Ongoing  Note: Patient has slept all shift.       Problem: Nutrition  Goal: Optimal nutrition therapy  Outcome: Ongoing  Note: Patient is receiving all her nutrition via PEG tube. Problem: DISCHARGE BARRIERS  Goal: Patient's continuum of care needs are met  Outcome: Ongoing     Care plan reviewed with patient . Patient  verbalize understanding of the plan of care and contribute to goal setting.

## 2022-04-13 NOTE — PROGRESS NOTES
Hospitalist Progress Note      Patient:  Russ Lucio    Unit/Bed:6K-18/018-A  YOB: 1950  MRN: [id number]   Acct: [de-identified]   PCP: Macie Farah MD  Date of Admission: 4/11/2022    Assessment/Plan:    1. ALVIN on CKD stage III, worsening: unclear etiology but some volume depletion contributing in additional to diuretic use per Nephro who has been consulted. Renal US without acute abnormalities. Aggressive IVFs. Avoid nephrotoxic agents / contrast.   2. Hyperkalemia: K noted at 5.3, on Lokelma. Continue with IVF. Nephrology following. Check daily BMP.  3. ? Dislodged L nephrostomy tube: no indication of dislodgement on CT A/P, IR has been consulted for assessment. Previously dislodged and replaced multiple times, last being 3/14/22.   4/13: Patient to undergo nephrostogram today  4. L staghorn calculus / bleeding L nephrostomy tube: Urology has been consulted. Repeat CT A/P from 4/12 shows L sided perinephric stranding and fluid collection adjacent to tube which is felt to likely be subcapsular hematoma. Continue with Q8 H&H checks. 4. Acute cystitis vs colonization: chronic seaman catheter, UA shows few bacteria, moderate leukocyte esterase and 50-75 with clumps WBC. Urine culture returned growing nonfermenting gram-negative bacilli, enteric gram-negative bacilli, and gram-positive cocci. Pt denies any burning at the site of her catheter. Will discuss with Urology if treatment is indicated at this time. Afebrile, no leukocytosis. 5. Acute on chronic normocytic anemia: Hg 7.5 on admission with baseline Hg 8.0s-9.0s. Bight red blood on nephrotomy tube. Repeated Hg 6.8. Transfuse 1 Unit RBC. Continue H& H q8hrs, transfusion if Hgb < 7  5. Elevated troponin: likely related to CKD, denies CP, EKG showed sinus tach with nonspecific ST/T wave change. 6. Chronic HFpEF without decompensation: TOBIAS 10/2021 EF 60% with trace TR.  On Bumex 0.5 mg daily which is held due to ALIVN and dehydration  5. Essential HTN:slightly elevated, continue with home meds and monitor closely   6. Hx stage III decub ulcer s/p diverting colostomy 1/27/22: Wound care consulted and following  7. Anxiety: Continue home medications    Chief Complaint: Abnormal lab    Initial H and P:-    \"Kenzie Rogers is a 73 y.o., , female presented to [facility] ED for abnormal lab from nursing home. Past medical history significant with chronic diastolic heart failure, obstructive staghorn calculus of the left kidney not a candidate for stone treatment which nephrostomy tube was placed, stage III decubitus ulcer, CKD stage III, general anxiety disorder. Visit patient awake alert and oriented to time person and place. Patient denies any chest pain, chest discomfort, abdominal pain, back pain, neck pain. Patient sister on the bedside report that patient had been having bright red blood in nephrostomy tube over last 3 days. Today patient nursing home notify abnormal lab work which patient was transferred to [facility] ED.     Patient is chronic critical ill with PEG tube for nutritional, divers colostomy bag for stage III decubitus ulcer, nephrostomy tube drain bright red blood. Patient living in nursing home, denies alcohol use, tobacco use.     Patient was admitted and managed for ALVIN secondary to dehydration and possible obstruction.      ED work-up: Afebrile with BP elevate. BMP show hypokalemia with potassium 5.4, serum osmolarity elevate 322.1. EKG showed sinus tachycardia with PVC, St & T wave abnormality. Patient is asymptomatic for chest pain. CBC showed anemia with hemoglobin 7.5 which reduced compared to her baseline at 8 on 9. \"    Subjective (past 24 hours):   4/13: pt doing okay, sitting up in bed with family at bedside. Denies fever/chills/CP/SOB. Still has dark blood noted in her nephrostomy bag. Denies any burning at the site of her catheter.        Past medical history, family history, social history and allergies reviewed again and is unchanged since admission. ROS (All review of systems completed. Pertinent positives noted. Otherwise All other systems reviewed and negative.)     Medications:  Reviewed    Infusion Medications    dextrose      sodium chloride      sodium chloride       Scheduled Medications    [Held by provider] aspirin  81 mg Oral Daily    [Held by provider] bumetanide  0.5 mg Oral Daily    famotidine  20 mg PEG Tube Every Other Day    ferrous sulfate  325 mg Oral Every Other Day    FLUoxetine  40 mg Oral Daily    metoprolol tartrate  12.5 mg Oral BID    sodium chloride flush  5-40 mL IntraVENous 2 times per day     PRN Meds: glucose, dextrose, glucagon (rDNA), dextrose, acetaminophen **OR** acetaminophen, melatonin, sodium chloride flush, sodium chloride, sodium chloride      Intake/Output Summary (Last 24 hours) at 4/13/2022 0834  Last data filed at 4/13/2022 0813  Gross per 24 hour   Intake 1511.71 ml   Output 1500 ml   Net 11.71 ml       Diet:  Diet NPO    Exam:  BP (!) 140/68   Pulse 102   Temp 97.9 °F (36.6 °C) (Oral)   Resp 16   Ht 4' 9\" (1.448 m)   Wt 147 lb (66.7 kg)   SpO2 92%   BMI 31.81 kg/m²   General appearance: No apparent distress, appears stated age and cooperative. HEENT: Pupils equal, round, and reactive to light. Conjunctivae/corneas clear. Neck: Supple, with full range of motion. No jugular venous distention. Trachea midline. Respiratory:  Normal respiratory effort. Clear to auscultation, bilaterally without Rales/Wheezes/Rhonchi. Cardiovascular: Regular rate and rhythm with normal S1/S2 without murmurs, rubs or gallops. Abdomen: Soft, non-tender, non-distended with normal bowel sounds. Musculoskeletal: passive and active ROM x 4 extremities. Skin: Skin color, texture, turgor normal.  No rashes or lesions. Neurologic:  Neurovascularly intact without any focal sensory/motor deficits.  Cranial nerves: II-XII intact, grossly non-focal.  Psychiatric: Alert and oriented, thought content appropriate, normal insight  Capillary Refill: Brisk,< 3 seconds   Peripheral Pulses: +2 palpable, equal bilaterally     Labs:   Recent Labs     04/11/22 2030 04/12/22 0336 04/12/22 1628 04/12/22 1917 04/13/22 0337   WBC 10.7  --   --   --  10.8   HGB 7.5*   < > 8.3* 8.2* 8.4*   HCT 25.2*   < > 28.1* 27.4* 28.1*     --   --   --  249    < > = values in this interval not displayed. Recent Labs     04/11/22 2030 04/12/22 0224 04/12/22 0832 04/12/22 1628 04/13/22 0337   *  --  136  --  141   K 5.4*   < > 5.3*  5.4* 5.3* 5.3*   CL 99  --  102  --  106   CO2 18*  --  17*  --  15*   *  --  160*  --  157*   CREATININE 4.9*  --  4.9*  --  5.2*   CALCIUM 9.2  --  8.8  --  8.8   PHOS  --   --   --   --  8.3*    < > = values in this interval not displayed. Recent Labs     04/11/22 2030 04/12/22 0832   AST 25 19   ALT 25 20   BILIDIR <0.2  --    BILITOT <0.2* <0.2*   ALKPHOS 79 75     No results for input(s): INR in the last 72 hours. No results for input(s): [place] in the last 72 hours. Microbiology:    Blood culture #1:   Lab Results   Component Value Date    BC No growth-preliminary No growth  01/21/2022       Blood culture #2:No results found for: Denis Lopez    Organism:  Lab Results   Component Value Date    ORG Cutibacterium acnes 02/22/2022    ORG Staphylococcus haemolyticus 02/22/2022         Lab Results   Component Value Date    LABGRAM  02/22/2022     Many segmented neutrophils observed. No epithelial cells observed. No bacteria seen.         MRSA culture only:No results found for: [facility]    Urine culture:   Lab Results   Component Value Date    LABURIN No growth-preliminary  01/20/2022    LABURIN [place] count: 1,000 CFU/mL  01/20/2022       Respiratory culture: No results found for: CULTRESP    Aerobic and Anaerobic :  Lab Results   Component Value Date    LABAERO  02/22/2022     No growth-preliminary Current antibiotic therapy ineffective in vitro for at least one of culture isolates. LABAERO light growth  02/22/2022    LABAERO  02/22/2022     very light growth Isolates of Methicillin Resistant Staphylococcus coagulase negative (MRSE) do NOT require CONTACT isolation. Methicillin(Oxacillin)resistant strains of staphylococci (MRSA)or(MRSE)should be considered resistant to all classes of cephalosporins, penems and beta-lactams. Lab Results   Component Value Date    LABANAE  02/22/2022     No anaerobes isolated- preliminary No anaerobes isolated        Urinalysis:      Lab Results   Component Value Date    NITRU NEGATIVE 04/12/2022    WBCUA 50-75W/CLUMPS 04/12/2022    BACTERIA FEW 04/12/2022    RBCUA > 200 04/12/2022    BLOODU LARGE 04/12/2022    SPECGRAV 1.014 01/20/2022    GLUCOSEU NEGATIVE 04/12/2022       Radiology:  CT ABDOMEN PELVIS WO CONTRAST Additional Contrast? None   Final Result   1. Small bilateral pleural effusions with adjacent atelectasis/infiltrate. 2. Left-sided perinephric stranding and fluid collection adjacent to a left-sided nephrostomy tube, likely a subcapsular hematoma. Stable calcifications in the left kidney. 3. Cholelithiasis. 4. Sacral decubitus ulcer. Final report electronically signed by Dr. Candelaria Barron on 4/12/2022 5:00 PM      US RENAL COMPLETE   Final Result   1. Significantly limited exam.   2. Increased renal cortical echogenicity bilaterally, compatible with    medical renal disease. 3. A left-sided nephrostomy tube is partially visualized. 4. Multiple left-sided renal calculi. 5. Mild left-sided caliectasis. The left renal pelvis is nondilated. 6. Nonspecific perinephric or subcapsular fluid is noted adjacent to the    left kidney, measuring 5.3 x 2.1 x 1.4 cm.       This document has been electronically signed by: Pancho Ng M.D. on    04/12/2022 02:30 AM      IR INTERVENTIONAL RADIOLOGY PROCEDURE REQUEST    (Results Pending)     CT ABDOMEN PELVIS WO CONTRAST Additional Contrast? None    Result Date: 4/12/2022  PROCEDURE: CT ABDOMEN PELVIS WO CONTRAST CLINICAL INFORMATION: Left staghorn calculus TECHNIQUE: CT of the abdomen and pelvis was performed without use of intravenous contrast. Axial images as well as sagittal and coronal reconstructions were obtained. All CT scans at this facility use dose modulation, iterative reconstruction, and/or weight-based dosing when appropriate to reduce radiation dose to as low as reasonably achievable. COMPARISON: CT abdomen and pelvis 3/13/2022 FINDINGS: Lower thorax: There are small bilateral pleural effusions. There is adjacent lung consolidation are present. The heart is enlarged. Abdomen: Evaluation is limited due to absence of contrast. There is no free intraperitoneal air. A gastrostomy tube is in the stomach. A left mid abdominal ostomy is stable and contains loops of bowel. There is no bowel obstruction. Calcifications are present in the lumen of the gallbladder. A nephrostomy tube is now present in the left kidney. Fluid adjacent to the tube and kidney has noncontrast mean Hounsfield units of 12 (image 34). Subcapsular fluid measures up to 2.2 cm in width (image 38). Perinephric stranding is again noted. Calcifications in the left kidney are stable. Left-sided hydronephrosis is not significantly changed. Hypoattenuating lesions in the kidneys may be cysts but are incompletely characterized on the current study. There  are calcified granulomas in the liver and spleen. Atherosclerotic calcifications are present in the abdominal aorta without evidence of aneurysm. There is no mesenteric or retroperitoneal lymphadenopathy. Degenerative changes are seen in the thoracolumbar spine without evidence of aggressive osseous lesions. Pelvis: There is a Padilla catheter in a nondistended urinary bladder, likely accounting for gas in the bladder. Platelets are present in the pelvis. There are calcifications in the uterus. There is no pelvic or inguinal lymphadenopathy. There is persistent subcutaneous tissue irregularity posterior to the sacrum. Degenerative changes are present in the pelvis without evidence of aggressive osseous lesions. 1. Small bilateral pleural effusions with adjacent atelectasis/infiltrate. 2. Left-sided perinephric stranding and fluid collection adjacent to a left-sided nephrostomy tube, likely a subcapsular hematoma. Stable calcifications in the left kidney. 3. Cholelithiasis. 4. Sacral decubitus ulcer. Final report electronically signed by Dr. Nathaly Farah on 4/12/2022 5:00 PM    US RENAL COMPLETE    Result Date: 4/12/2022  RENAL ULTRASOUND COMPARISON: 1/3/2022. FINDINGS: Examination is significantly limited due to patient positioning and immobility. Increased renal cortical echogenicity is noted bilaterally, compatible with medical renal disease. Multiple shadowing calculi are noted within the left kidney. For example there is a 2.7 cm stone in the left kidney on image 41. Left-sided nephrostomy tube is partially visualized. Mild caliectasis is noted in the left kidney. Left renal pelvis is nondilated. Nonspecific perinephric or subcapsular fluid is noted adjacent to the left kidney, measuring 5.3 x 2.1 x 1.4 cm on image 49. Urinary bladder contains a Padilla catheter. 1. Significantly limited exam. 2. Increased renal cortical echogenicity bilaterally, compatible with medical renal disease. 3. A left-sided nephrostomy tube is partially visualized. 4. Multiple left-sided renal calculi. 5. Mild left-sided caliectasis. The left renal pelvis is nondilated. 6. Nonspecific perinephric or subcapsular fluid is noted adjacent to the left kidney, measuring 5.3 x 2.1 x 1.4 cm.  This document has been electronically signed by: Patrizia Stoll M.D. on 04/12/2022 02:30 AM      Electronically signed by Sandra Martinez PA-C on 4/13/2022 at 8:34 AM

## 2022-04-13 NOTE — PROGRESS NOTES
1610 Patient arrived to IR for nephrostogram. Procedure explained to patient and verbal consent obtained.

## 2022-04-13 NOTE — ACP (ADVANCE CARE PLANNING)
Advance Care Planning     Advance Care Planning Inpatient Note  [facility]    Today's Date: 4/13/2022  Unit: [facility] RENAL TELEMETRY 6K    Received request from IDT Member. Upon review of chart and communication with care team, patient's decision making abilities are not in question. . Patient and Healthcare Decision Maker was/were present in the room during visit. Goals of ACP Conversation:  Discuss advance care planning documents    Health Care Decision Makers:       Primary Decision Maker: Josue Pena - Brother/Sister - [phone]    Summary:  Completed New Documents    Advance Care Planning Documents (Patient Wishes):  Healthcare Power of /Advance Directive Appointment of Health Care Agent  Living Will/Advance Directive     Assessment:  Advanced directives Consult: Pt was dealing with acute kidney injury. She completed her POA/LW. Her brother and sister were in the room at the time of the visit. The document was filled. Prayer appreciated.      Interventions:  Assisted in the completion of documents according to patient's wishes at this time    Care Preferences Communicated:   No    Outcomes/Plan:  ACP Discussion: Completed    Electronically signed by Baylee Acuna, [address] on 4/13/2022 at 4:21 PM

## 2022-04-14 ENCOUNTER — APPOINTMENT (OUTPATIENT)
Dept: GENERAL RADIOLOGY | Age: 72
DRG: 673 | End: 2022-04-14
Payer: MEDICARE

## 2022-04-14 LAB
ALBUMIN SERPL-MCNC: 2.2 G/DL (ref 3.5–5.1)
ALP BLD-CCNC: 74 U/L (ref 38–126)
ALT SERPL-CCNC: 17 U/L (ref 11–66)
ANION GAP SERPL CALCULATED.3IONS-SCNC: 19 MEQ/L (ref 8–16)
AST SERPL-CCNC: 16 U/L (ref 5–40)
BASOPHILS # BLD: 0.5 %
BASOPHILS ABSOLUTE: 0 THOU/MM3 (ref 0–0.1)
BILIRUB SERPL-MCNC: 0.2 MG/DL (ref 0.3–1.2)
BUN BLDV-MCNC: 150 MG/DL (ref 7–22)
CALCIUM SERPL-MCNC: 8.3 MG/DL (ref 8.5–10.5)
CHLORIDE BLD-SCNC: 104 MEQ/L (ref 98–111)
CO2: 17 MEQ/L (ref 23–33)
CREAT SERPL-MCNC: 5.3 MG/DL (ref 0.4–1.2)
EOSINOPHIL # BLD: 3.6 %
EOSINOPHILS ABSOLUTE: 0.3 THOU/MM3 (ref 0–0.4)
ERYTHROCYTE [DISTWIDTH] IN BLOOD BY AUTOMATED COUNT: 16 % (ref 11.5–14.5)
ERYTHROCYTE [DISTWIDTH] IN BLOOD BY AUTOMATED COUNT: 55.5 FL (ref 35–45)
GFR SERPL CREATININE-BSD FRML MDRD: 8 ML/MIN/1.73M2
GLUCOSE BLD-MCNC: 102 MG/DL (ref 70–108)
GLUCOSE BLD-MCNC: 113 MG/DL (ref 70–108)
GLUCOSE BLD-MCNC: 208 MG/DL (ref 70–108)
GLUCOSE BLD-MCNC: 74 MG/DL (ref 70–108)
HCT VFR BLD CALC: 24.6 % (ref 37–47)
HCT VFR BLD CALC: 25.4 % (ref 37–47)
HCT VFR BLD CALC: 26.1 % (ref 37–47)
HEMOGLOBIN: 7.4 GM/DL (ref 12–16)
HEMOGLOBIN: 7.8 GM/DL (ref 12–16)
HEMOGLOBIN: 7.9 GM/DL (ref 12–16)
IMMATURE GRANS (ABS): 0.04 THOU/MM3 (ref 0–0.07)
IMMATURE GRANULOCYTES: 0.4 %
LYMPHOCYTES # BLD: 7.9 %
LYMPHOCYTES ABSOLUTE: 0.8 THOU/MM3 (ref 1–4.8)
MAGNESIUM: 1.6 MG/DL (ref 1.6–2.4)
MCH RBC QN AUTO: 28.5 PG (ref 26–33)
MCHC RBC AUTO-ENTMCNC: 29.9 GM/DL (ref 32.2–35.5)
MCV RBC AUTO: 95.3 FL (ref 81–99)
MONOCYTES # BLD: 8.9 %
MONOCYTES ABSOLUTE: 0.8 THOU/MM3 (ref 0.4–1.3)
NUCLEATED RED BLOOD CELLS: 0 /100 WBC
PHOSPHORUS: 8 MG/DL (ref 2.4–4.7)
PLATELET # BLD: 235 THOU/MM3 (ref 130–400)
PMV BLD AUTO: 8.9 FL (ref 9.4–12.4)
POTASSIUM SERPL-SCNC: 4.8 MEQ/L (ref 3.5–5.2)
PROCALCITONIN: 0.57 NG/ML (ref 0.01–0.09)
RBC # BLD: 2.74 MILL/MM3 (ref 4.2–5.4)
SEG NEUTROPHILS: 78.7 %
SEGMENTED NEUTROPHILS ABSOLUTE COUNT: 7.5 THOU/MM3 (ref 1.8–7.7)
SODIUM BLD-SCNC: 140 MEQ/L (ref 135–145)
TOTAL PROTEIN: 6.3 G/DL (ref 6.1–8)
WBC # BLD: 9.5 THOU/MM3 (ref 4.8–10.8)

## 2022-04-14 PROCEDURE — 2580000003 HC RX 258: Performed by: INTERNAL MEDICINE

## 2022-04-14 PROCEDURE — 82948 REAGENT STRIP/BLOOD GLUCOSE: CPT

## 2022-04-14 PROCEDURE — 74230 X-RAY XM SWLNG FUNCJ C+: CPT

## 2022-04-14 PROCEDURE — 99231 SBSQ HOSP IP/OBS SF/LOW 25: CPT | Performed by: NURSE PRACTITIONER

## 2022-04-14 PROCEDURE — 83735 ASSAY OF MAGNESIUM: CPT

## 2022-04-14 PROCEDURE — 85025 COMPLETE CBC W/AUTO DIFF WBC: CPT

## 2022-04-14 PROCEDURE — 36415 COLL VENOUS BLD VENIPUNCTURE: CPT

## 2022-04-14 PROCEDURE — 2500000003 HC RX 250 WO HCPCS: Performed by: INTERNAL MEDICINE

## 2022-04-14 PROCEDURE — 2580000003 HC RX 258: Performed by: STUDENT IN AN ORGANIZED HEALTH CARE EDUCATION/TRAINING PROGRAM

## 2022-04-14 PROCEDURE — 84100 ASSAY OF PHOSPHORUS: CPT

## 2022-04-14 PROCEDURE — 85018 HEMOGLOBIN: CPT

## 2022-04-14 PROCEDURE — 80053 COMPREHEN METABOLIC PANEL: CPT

## 2022-04-14 PROCEDURE — 84145 PROCALCITONIN (PCT): CPT

## 2022-04-14 PROCEDURE — 6370000000 HC RX 637 (ALT 250 FOR IP): Performed by: STUDENT IN AN ORGANIZED HEALTH CARE EDUCATION/TRAINING PROGRAM

## 2022-04-14 PROCEDURE — 99233 SBSQ HOSP IP/OBS HIGH 50: CPT | Performed by: INTERNAL MEDICINE

## 2022-04-14 PROCEDURE — 92611 MOTION FLUOROSCOPY/SWALLOW: CPT

## 2022-04-14 PROCEDURE — 6360000002 HC RX W HCPCS: Performed by: PHYSICIAN ASSISTANT

## 2022-04-14 PROCEDURE — 71045 X-RAY EXAM CHEST 1 VIEW: CPT

## 2022-04-14 PROCEDURE — 94669 MECHANICAL CHEST WALL OSCILL: CPT

## 2022-04-14 PROCEDURE — 6360000002 HC RX W HCPCS

## 2022-04-14 PROCEDURE — 1200000000 HC SEMI PRIVATE

## 2022-04-14 PROCEDURE — 2500000003 HC RX 250 WO HCPCS: Performed by: PHYSICIAN ASSISTANT

## 2022-04-14 PROCEDURE — 85014 HEMATOCRIT: CPT

## 2022-04-14 PROCEDURE — 99233 SBSQ HOSP IP/OBS HIGH 50: CPT | Performed by: PHYSICIAN ASSISTANT

## 2022-04-14 RX ORDER — ONDANSETRON 2 MG/ML
4 INJECTION INTRAMUSCULAR; INTRAVENOUS EVERY 6 HOURS PRN
Status: DISCONTINUED | OUTPATIENT
Start: 2022-04-14 | End: 2022-04-20 | Stop reason: CLARIF

## 2022-04-14 RX ORDER — ONDANSETRON 2 MG/ML
INJECTION INTRAMUSCULAR; INTRAVENOUS
Status: COMPLETED
Start: 2022-04-14 | End: 2022-04-14

## 2022-04-14 RX ADMIN — BARIUM SULFATE 50 ML: 400 SUSPENSION ORAL at 09:23

## 2022-04-14 RX ADMIN — SODIUM CHLORIDE, PRESERVATIVE FREE 10 ML: 5 INJECTION INTRAVENOUS at 20:52

## 2022-04-14 RX ADMIN — FERROUS SULFATE TAB 325 MG (65 MG ELEMENTAL FE) 325 MG: 325 (65 FE) TAB at 09:57

## 2022-04-14 RX ADMIN — FLUOXETINE 40 MG: 20 CAPSULE ORAL at 09:57

## 2022-04-14 RX ADMIN — CIPROFLOXACIN 400 MG: 2 INJECTION, SOLUTION INTRAVENOUS at 23:00

## 2022-04-14 RX ADMIN — ONDANSETRON 4 MG: 2 INJECTION INTRAMUSCULAR; INTRAVENOUS at 10:52

## 2022-04-14 RX ADMIN — CIPROFLOXACIN 400 MG: 2 INJECTION, SOLUTION INTRAVENOUS at 10:20

## 2022-04-14 RX ADMIN — SODIUM BICARBONATE: 84 INJECTION, SOLUTION INTRAVENOUS at 22:59

## 2022-04-14 RX ADMIN — METOPROLOL TARTRATE 12.5 MG: 25 TABLET, FILM COATED ORAL at 20:52

## 2022-04-14 RX ADMIN — BARIUM SULFATE 50 ML: 400 PASTE ORAL at 09:23

## 2022-04-14 RX ADMIN — METOPROLOL TARTRATE 12.5 MG: 25 TABLET, FILM COATED ORAL at 09:57

## 2022-04-14 RX ADMIN — MICONAZOLE NITRATE: 20 POWDER TOPICAL at 20:52

## 2022-04-14 RX ADMIN — SODIUM BICARBONATE: 84 INJECTION, SOLUTION INTRAVENOUS at 11:13

## 2022-04-14 RX ADMIN — MICONAZOLE NITRATE: 20 POWDER TOPICAL at 16:43

## 2022-04-14 RX ADMIN — BARIUM SULFATE 50 ML: 0.81 POWDER, FOR SUSPENSION ORAL at 09:23

## 2022-04-14 ASSESSMENT — PAIN SCALES - GENERAL
PAINLEVEL_OUTOF10: 0

## 2022-04-14 NOTE — PROGRESS NOTES
Comprehensive Nutrition Assessment    Type and Reason for Visit:  Reassess (diet, tubefeeding)    Nutrition Recommendations/Plan:   -Will start nocturnal tubefeeding d/t very poor oral intake, to provide~  63% of estimated daily caloric needs as to encourage oral intake. Nepro at 40 ml/hr x 12 hours/day (6pm-6am). Flush 30 mls water before and after cyclic feeding. Additional free water flush per Provider.  -Diet per SLP, note Soft and Bited diet, Moderate thickened liquids per MBS results today. Nutrition Assessment:    Pt improving from a nutritional standpoint AEB diet initiated and plan to start supplemental tubefeedings today. Remains at risk for further nutritional compromise r/t dysphagia, poor oral intake, increased nutrient needs to support wound healing, on supplemental EN prior to admit, admit with ALVIN on CKD, blood from nephrostomy tube (obstructive staghorn calculus), anemia, chronic CHF, and underlying medical condition (hx: previous lengthy hospital course: [facility] admit 10/27 - 12/1/21 for pneumonia then [facility], then back to [facility] with eventual discharged to [facility]; vent/trach-(removed), dysphagia d/t prolonged intubations, PEG 11/24/21, hx HD (none since 12/23/21), wound debridements, 1/27/22: diverting colostomy, renal calculi, left nephrostomy tubes, HTN, OA, depression, thrombocytopenia, gout attack, CHF, ECF reported Covid in 10/2021). Malnutrition Assessment:  Malnutrition Status: At risk for malnutrition (Comment)    Context:  Chronic Illness     Findings of the 6 clinical characteristics of malnutrition:  Energy Intake:  No significant decrease in energy intake (Patient has been receiving tubefeedings since 11/24/21, meeting projected nutritional needs and tolerating at [facility] prior to admit)  Weight Loss:   (Hard to assess with CHF, hx of HD (none since 12/23/21).   Note relatively stable weight since 1/20/22, note weight down~ 18.1% in the last 11 months)     Body Fat Loss:  No significant body fat loss     Muscle Mass Loss:  1 - Mild muscle mass loss Temples (temporalis)  Fluid Accumulation:  1 - Mild Extremities   Strength:  Not Performed    Estimated Daily Nutrient Needs:  Energy (kcal):  ~ 2439-4684 kcals (18-20 kcals/kg/day); Weight Used for Energy Requirements:   (67 kg)     Protein (g):  42 grams (1 gram/kg/day) or more pending renal/wound status; Weight Used for Protein Requirements:  Ideal (42 kg (adjusted IBW for low stature))        Fluid (ml/day):  0744-7396 mls (25-30 mls/kg/day), hx: CHF, previous HD (none since 12/23/21); Nutrition Related Findings:       Pt. Report/Treatments/Miscellaneous: SLP following. Patient seen prior to MBS this morning. Supper tray from last night at bedside, untouched. Ate only a some yogurt for breakfast this morning. Continues with poor appetite. Per ECF, pt ate only a few bites at best at meals, TF's were main source of nutrition. Will trial noctural TF's to provide some nutrition support but not provide 100% of needs as to encourage appetite. 4/13/22: Neprostagram procedure  GI Status: 200 mls colosotmy  Pertinent Labs: 4/14/22: Sodium 140, Potassium 4.8, , Creatinine 5.3, Glucose 74, Phos 8, Hgb 7.8  Pertinent Meds: cipro IV, pepcid, iron, sodium bicarbonate at 100 ml/hr    Wounds:   (stage 4 sacrum, right and left second toe blisters)       Current Nutrition Therapies:    ADULT TUBE FEEDING; PEG; Renal Formula; Cyclic; 40; 0:04 PM; 9:54 AM; 30; Other (specify); 30 mls before and after cyclic feeding  ADULT DIET; Dysphagia - Soft and Bite Sized; Moderately Thick (Honey);  No Drinking Straws  Current Tube Feeding (TF) Orders:  · Feeding Route: PEG (Placed 11/24/21)  · Formula: Renal Formula (Nepro)  · Schedule: Cyclic (2AB-8VY)  · Water Flushes: 30 mls before and 30 mls after cyclic feeding, additional free water flush per Provider  · At [facility]: Nepro at 40 ml/hr x 24 hours/day, 1 oz prostat bolused daily, 300 mls free water flush every 8 hours~ 1799 kcals (1699 TF, 100 prostat), 93 grams protein (78 TF, 15 prostat), 164 grams CHO (154 TF, 10 prostat), 24 grams fiber, 1890 mls volume (960 TF, 30 prostat, 900 flushes) per 24 hours  · Goal TF & Flush Orders Provides: Nepro at 40 ml/hr x 12 hours per day (6pm-6am)~ 850 kcals (~ 63% of estimated daily needs), 39 grams protein, 77 grams CHO, 12 grams fiber, 349 mls free water (409 mls with flushes) in 480 mls volume (540 mls with flushes) per 24 hours. Anthropometric Measures:  · Height: 4' 9\" (144.8 cm)  · Current Body Weight: 150 lb 2.1 oz (68.1 kg) (4/14/22: nonpittling LE edema)   · Admission Body Weight: 134 lb (60.8 kg) (4/11/22 no edema)    · Usual Body Weight:  (Patient \"not sure\". Per EMR: 5/10/21: 180#3. 2oz, 10/17/21: 164#, 1/20/22: 144#13.5oz, 3/13/22: 133# bedscale. Per ECF weights: 2/4/22: 144#, 2/15/22: 135.5#, 3/1/22: 133#, 4/5/22: 133#,4/10/22: 148.7#)     · Ideal Body Weight: 85 lbs;   · BMI: 32.5  · Adjusted Body Weight:  ;  (Adjusted IBW for low stature: 93# (42 kg))   · BMI Categories: Obese Class 1 (BMI 30.0-34. 9)       Nutrition Diagnosis:   · Inadequate oral intake related to inadequate protein-energy intake as evidenced by intake 0-25%,poor intake prior to admission,nutrition support - enteral nutrition    Nutrition Interventions:   Food and/or Nutrient Delivery:  Continue Current Diet,Start Tube Feeding  Nutrition Education/Counseling:  Education initiated (Encouraged oral intake and discussed plan for supplemental TF's. )   Coordination of Nutrition Care:  Continue to monitor while inpatient    Goals:  Patient will receive 75% or more of estimated nutrient needs via diet and EN during LOS.        Nutrition Monitoring and Evaluation:   Behavioral-Environmental Outcomes:  None Identified   Food/Nutrient Intake Outcomes:  Diet Advancement/Tolerance,Food and Nutrient Intake,Enteral Nutrition Intake/Tolerance,IVF Intake  Physical Signs/Symptoms Outcomes:  Biochemical Data,Chewing or Swallowing,GI Status,Fluid Status or Edema,Meal Time Behavior,Nutrition Focused Physical Findings,Skin,Weight     Discharge Planning:     Too soon to determine     Electronically signed by Hector Juarez, ANNE-MARIE, LD on 4/14/22 at 9:46 AM EDT    Contact: [phone]

## 2022-04-14 NOTE — CARE COORDINATION
4/14/22, 8:42 AM EDT    DISCHARGE ON GOING EVALUATION    Ema Olivas day: 2  Location: [facility]18/018-A Reason for admit: ALVIN (acute kidney injury) ([facility].) [N17.9]  Acute renal failure superimposed on chronic kidney disease, unspecified CKD stage, unspecified acute renal failure type ([facility] [N17.9, N18.9]   Procedure: 4/14 nephrostogram  Barriers to Discharge: CO2 17, creat 5.3, phos 8.0, Hgb 7.8. Nephrostomy functioning well. Remains using bicarb gtt, IV cipro. MBS today. Urology and nephrology following. PCP: Rhys Baldwin MD  Readmission Risk Score: 27.7 ( )%  Patient Goals/Plan/Treatment Preferences: Return to [place] [id number].

## 2022-04-14 NOTE — PROGRESS NOTES
Urology Progress Note    Chief Complaint: Abnormal lab, blood in nephrostomy tube  Reason for consultation:  Gross hematuria from L nephrostomy tube. Subjective:     Pt resting in bed. Denies pain. Nephrostomy tube with brown/red and some yellow in tubing urine with dark red/brownish urine in bag. Sister at bedside. Clear hickman urine in seaman bag. Vitals:  /63   Pulse 78   Temp 98.2 °F (36.8 °C) (Oral)   Resp 16   Ht 4' 9\" (1.448 m)   Wt 150 lb 2.1 oz (68.1 kg)   SpO2 90%   BMI 32.49 kg/m²   Temp  Av.4 °F (36.9 °C)  Min: 98.2 °F (36.8 °C)  Max: 98.6 °F (37 °C)    Intake/Output Summary (Last 24 hours) at 2022 1208  Last data filed at 2022 1606  Gross per 24 hour   Intake 0 ml   Output 750 ml   Net -750 ml       Social History     Socioeconomic History    Marital status: Single     Spouse name: Not on file    Number of children: 0    Years of education: Not on file    Highest education level: Not on file   Occupational History    Not on file   Tobacco Use    Smoking status: Never Smoker    Smokeless tobacco: Never Used   Vaping Use    Vaping Use: Never used   Substance and Sexual Activity    Alcohol use: No    Drug use: No    Sexual activity: Not Currently   Other Topics Concern    Not on file   Social History Narrative    Not on file     Social Determinants of Health     Financial Resource Strain: Low Risk     Difficulty of Paying Living Expenses: Not very hard   Food Insecurity: No Food Insecurity    Worried About Running Out of Food in the Last Year: Never true    Traci of Food in the Last Year: Never true   Transportation Needs:     Lack of Transportation (Medical): Not on file    Lack of Transportation (Non-Medical):  Not on file   Physical Activity:     Days of Exercise per Week: Not on file    Minutes of Exercise per Session: Not on file   Stress:     Feeling of Stress : Not on file   Social Connections:     Frequency of Communication with Friends and Family: Not on file    Frequency of Social Gatherings with Friends and Family: Not on file    Attends Druze Services: Not on file    Active Member of Clubs or Organizations: Not on file    Attends Club or Organization Meetings: Not on file    Marital Status: Not on file   Intimate Partner Violence:     Fear of Current or Ex-Partner: Not on file    Emotionally Abused: Not on file    Physically Abused: Not on file    Sexually Abused: Not on file   Housing Stability:     Unable to Pay for Housing in the Last Year: Not on file    Number of Places Lived in the Last Year: Not on file    Unstable Housing in the Last Year: Not on file     Family History   Problem Relation Age of Onset    Diabetes Father     Arthritis Mother     COPD Mother     Diabetes Sister     Heart Disease Maternal Uncle     Breast Cancer Niece 36    Sleep Apnea Brother     Asthma Neg Hx     Birth Defects Neg Hx     Cancer Neg Hx     Depression Neg Hx     Early Death Neg Hx     Hearing Loss Neg Hx     High Blood Pressure Neg Hx     High Cholesterol Neg Hx     Kidney Disease Neg Hx     Learning Disabilities Neg Hx     Mental Illness Neg Hx     Mental Retardation Neg Hx     Miscarriages / Stillbirths Neg Hx     Stroke Neg Hx     Substance Abuse Neg Hx     Vision Loss Neg Hx     Other Neg Hx      No Known Allergies      Constitutional: Pt sleepy. Arouses to name. HEENT:   Head:         Normocephalic and atraumatic. Mucous membranes are normal.   Eyes:         EOM are normal. No scleral icterus. Nose:    The external appearance of the nose is normal  Ears: The ears appear normal to external inspection. Cardiovascular:       Normal rate, regular rhythm. Pulmonary/Chest:  Normal respiratory rate and rhthym. No use of accessory muscles. Lungs clear bilaterally with diminished bases. Abdominal:          Soft, nontender, hypoactive.   Genitalia:    Padilla catheter draining hickman yellow urine without clots. L nephrostomy tube with dark red/brownish urine in bag  Musculoskeletal:    Normal range of motion. Exhibits no edema or tenderness of lower extremities. Extremities:    No cyanosis, clubbing, or edema present. Neurological:    Alert and oriented. Labs:  WBC:    Lab Results   Component Value Date    WBC 9.5 04/14/2022     Hemoglobin/Hematocrit:    Lab Results   Component Value Date    HGB 7.9 04/14/2022    HCT 25.4 04/14/2022     BMP:    Lab Results   Component Value Date     04/14/2022    K 4.8 04/14/2022    K 4.1 03/14/2022     04/14/2022    CO2 17 04/14/2022     04/14/2022    LABALBU 2.2 04/14/2022    CREATININE 5.3 04/14/2022    CALCIUM 8.3 04/14/2022    LABGLOM 8 04/14/2022       Narrative   PROCEDURE: CT ABDOMEN PELVIS WO CONTRAST       CLINICAL INFORMATION: Left staghorn calculus       TECHNIQUE: CT of the abdomen and pelvis was performed without use of intravenous contrast. Axial images as well as sagittal and coronal reconstructions were obtained.       All CT scans at this facility use dose modulation, iterative reconstruction, and/or weight-based dosing when appropriate to reduce radiation dose to as low as reasonably achievable.       COMPARISON: CT abdomen and pelvis 3/13/2022       FINDINGS:        Lower thorax: There are small bilateral pleural effusions. There is adjacent lung consolidation are present. The heart is enlarged.       Abdomen: Evaluation is limited due to absence of contrast. There is no free intraperitoneal air. A gastrostomy tube is in the stomach. A left mid abdominal ostomy is stable and contains loops of bowel. There is no bowel obstruction. Calcifications are    present in the lumen of the gallbladder. A nephrostomy tube is now present in the left kidney. Fluid adjacent to the tube and kidney has noncontrast mean Hounsfield units of 12 (image 34). Subcapsular fluid measures up to 2.2 cm in width (image 38).     Perinephric stranding is again noted. Calcifications in the left kidney are stable. Left-sided hydronephrosis is not significantly changed. Hypoattenuating lesions in the kidneys may be cysts but are incompletely characterized on the current study. There    are calcified granulomas in the liver and spleen. Atherosclerotic calcifications are present in the abdominal aorta without evidence of aneurysm. There is no mesenteric or retroperitoneal lymphadenopathy. Degenerative changes are seen in the    thoracolumbar spine without evidence of aggressive osseous lesions.       Pelvis: There is a Padilla catheter in a nondistended urinary bladder, likely accounting for gas in the bladder. Platelets are present in the pelvis. There are calcifications in the uterus. There is no pelvic or inguinal lymphadenopathy. There is    persistent subcutaneous tissue irregularity posterior to the sacrum. Degenerative changes are present in the pelvis without evidence of aggressive osseous lesions.           Impression   1. Small bilateral pleural effusions with adjacent atelectasis/infiltrate. 2. Left-sided perinephric stranding and fluid collection adjacent to a left-sided nephrostomy tube, likely a subcapsular hematoma. Stable calcifications in the left kidney. 3. Cholelithiasis. 4. Sacral decubitus ulcer. radiation information found for this patient  Narrative   PROCEDURE: IR GUIDED NEPHROSTOGRAM/URETEROGRAM EXISTING ACCESS       PERFORMED BY:  Dr. Francisco Garcias M.D.       CLINICAL INFORMATION: Bright red blood on nephrotomy tube x 3 days . Hg drop and received 1 unit RBC. Hx Nephrolomy tube place 2 weeks ago       Nephrostomy tube: 8 Yoruba nephrostomy tube left side         FLUOROSCOPY TIME: 1 minute 25 seconds   FLUOROSCOPIC IMAGES: 2       TECHNIQUE: The patient came to the Department with a nephrostomy tube in place.  Iodinated contrast material was injected and a nephrostogram was performed.       FINDINGS: There is free flow of contrast material through the tube, into the intrarenal collecting .           Impression   The tip of the nephrostomy tube is coiled in the left renal collecting system. Impression/plan:    1. Left Staghorn Calculus/Bleeding from Left Nephrostomy Tube- Renal US demonstrates a subcapsular fluid collection adjacent to the left kidney. CT with nephrostomy tube in kidney. IR has been consulted to see if they can assess left nephrostomy tube position and change if needed. If nephrostomy tube needs to be changed, a nephroureteral catheter would be recommended to try to prevent any further dislodging with positional changes. Urine culture pending. Blood in nephrostomy tube improving. Blood in seaman resolved. Nephrostogram 4/13/22 with tube in appropriate position. Left PCNL has not been scheduled due to patient's continuing medical problems.     2. 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 CT a/p without contrast pending. Likely multifactorial. May be a component of volume depletion and diuretics. Hydration. Nephrology managing.      3. Hyperkalemia- On Lokelma.      4. Acute Blood Loss Anemia- Received blood 4/12/22     5.  Chronic Diastolic Heart Failure    [facility]  04/14/22 12:08 PM  Urology

## 2022-04-14 NOTE — PLAN OF CARE
Problem: Nutrition  Goal: Optimal nutrition therapy  4/14/2022 0945 by Lorri Bustamante RD, LD  Outcome: Ongoing   Nutrition Problem #1: Inadequate oral intake  Intervention: Food and/or Nutrient Delivery: Continue Current Diet,Start Tube Feeding  Nutritional Goals: Patient will receive 75% or more of estimated nutrient needs via diet and EN during LOS.

## 2022-04-14 NOTE — PROGRESS NOTES
[facility]  SPEECH THERAPY  STRZ RENAL TELEMETRY 6K  Modified Barium Swallow    SLP Individual Minutes  Time In: 4817  Time Out: 6601  Minutes: 17  Timed Code Treatment Minutes: 0 Minutes       Date: 2022  Patient Name: Suze Gomez      CSN: [id number]   : 1950  (73 y.o.)  Gender: female   Referring Physician: Darien Hidalgo PA-C   Diagnosis: ALVIN  Precautions: Aspiration risks, fall risk   History of Present Illness/Injury: Patient admit to [facility] with above medical dx. Per physician H&P, Dorrene Hodgkin is a 73 y.o., , female presented to [facility] ED for abnormal lab from nursing home. Past medical history significant with chronic diastolic heart failure, obstructive staghorn calculus of the left kidney not a candidate for stone treatment which nephrostomy tube was placed, stage III decubitus ulcer, CKD stage III, general anxiety disorder. Visit patient awake alert and oriented to time person and place. Patient denies any chest pain, chest discomfort, abdominal pain, back pain, neck pain. Patient sister on the bedside report that patient had been having bright red blood in nephrostomy tube over last 3 days. Today patient nursing home notify abnormal lab work which patient was transferred to [facility] ED.     Patient is chronic critical ill with PEG tube for nutritional, divers colostomy bag for stage III decubitus ulcer, nephrostomy tube drain bright red blood. Patient living in nursing home, denies alcohol use, tobacco use.     Patient was admitted and managed for ALVIN secondary to dehydration and possible obstruction. \"     ST recommendations for MBS s/t h/o dysphagia, altered diet recommendations and use of PEG tube in order to further assess and r/o continued pharyngeal dysfunction.     has a past medical history of CHF (congestive heart failure) ([address] Depression, Gout attack, Hypertension, Osteoarthritis, Pulmonary artery hypertension ([address] Renal calculi, and Thrombocytopenia [address] [address] Current Diet: Minced/moist and thin liquids     Pain: No pain reported. SUBJECTIVE:  Patient brought down to fluoroscopy suite via bed. Alert and cooperative. OBJECTIVE:    Respiratory Status:  Room Air    Behavioral Observation:  Alert and cooperative    PATIENT WAS EVALUATED USING:  Barium: Thin Liquids, Mildly Thick Liquids, Moderately Thick Liquids, Puree, Soft Solids and Coarse Solids    ORAL PREPARATION PHASE:  Impaired:  Slow Mastication, Uncoordinated Mastication and Decreased Bolus Control    ORAL PHASE: Uncontrolled Bolus/Diffuse Fall Over Tongue Base     ORAL PHASE DORIS SCORE: (Dysphagia outcome and severity scale)  4 = Mild-Moderate Dysphagia - May have one or two diet consistencies restricted - Oral residue clears with cue - Intermittent supervision or cueing    PHARYNGEAL PHASE:  Impaired: Delayed Swallow, Decreased Airway Protection, Decreased Epiglottic Inversion, Decreased Hyolaryngeal Elevation, Decreased Hyolaryngeal Anterior Excursion, Residue in the Valleculae and Residue in the Pyriform Sinus     PHARYNGEAL PHASE DORIS SCORE: (Dysphagia outcome and severity scale)  4 = Mild-Moderate Dysphagia - One or two consistencies restricted - may exhibit one or more of the following: Residue clears with cue, Aspiration of one consistency with weak or no reflexive cough, Laryngeal penetration to the vocal cords with cough with two consistencies, Laryngeal penetration to the vocal cords without cough on one consistency    EVIDENCE FOR LARYNGEAL PENETRATION AND/OR ASPIRATION:  Laryngeal penetration evident with thin and mildly thick barium   Audible aspiration evident with thin barium by straw   Silent aspiration evident with thin barium by cup     PENETRATION-ASPIRATION SCALE (PAS):   Thin Liquids: 8 = Material enters the airway, passes below the vocal folds, and no effort is made to eject  Mildly Thick Liquids:  3 = Material enters the airway, remains above the vocal folds, and stasis in the pyriforms with adequate clearance to follow spontaneous double swallows. Recommendations for diet change to soft/bite sized and moderately thick liquids. ST to follow up with ongoing dysphagia management/intervention. Diet Recommendations:  Soft/bite sized diet and moderately thick liquids   Strategies:  Full Upright Position, Small Bite/Sip, No Straw, Pulmonary Monitoring, Medication in Applesauce, Alternate Solids and Liquids, Limit Distractions and Monitor for Fatigue   Rehabilitation Potential: good    EDUCATION:  Learner: Patient  Education:  Reviewed results and recommendations of this evaluation, Reviewed diet and strategies, Reviewed ST goals and Plan of Care and Reviewed recommendations for follow-up  Evaluation of Education: Verbalizes understanding, Demonstrates with assistance, Needs further instruction and Family not present    PLAN:  Skilled SLP intervention on acute care 3-5 x per week or until goals met and/or pt plateaus in function. Specific interventions for next session may include: dysphagia management and rehabilitation . PATIENT GOAL:    Return to prior level of function. SHORT TERM GOALS:  Short-term Goals  Timeframe for Short-term Goals: 2 weeks  Goal 1: Patient will consume a soft/bite sized diet and moderately thick liquids without overt s/s of airway invasion to meet nutritional/hydration measures safely. Goal 2: Patient will consume advanced PO solids and thin liquids with use of bolus hold maneuver with ST only without overt s/s of airway invasion to progress solids and determine appropriateness for repeat instrumental assessment. Goal 3: Patient will complete pharyngeal strengthening exercises (i.e. effortful, joaquina) x10 for improved pharyngeal shortening for enhanced airway protection. Goal 4: Complete repeat MBS in 1-2 weeks to further assess and determine ability to advance liquid intake pending on level of pharyngeal dysfunction still present.   Goal 5: Monitor cognitive functioning and complete further assessment as deemed clinically indicated.       LONG TERM GOALS:  No established LTG's given short SELMAOS       Chaparro Fam M.S. Jenny Maffucci 4/14/2022

## 2022-04-14 NOTE — PROGRESS NOTES
0700 Assumed care of care of pt from [facility].   Pt resting and has no complaints at this

## 2022-04-14 NOTE — PLAN OF CARE
Problem: Falls - Risk of:  Goal: Will remain free from falls  Description: Will remain free from falls  4/14/2022 0202 by Veronica PLASCENCIA  Outcome: Ongoing  4/13/2022 1626 by Sully Arango RN  Outcome: Ongoing  Goal: Absence of physical injury  Description: Absence of physical injury  4/14/2022 0202 by Veronica PLASCENCIA  Outcome: Ongoing  Note: No falls noted this shift. Continue falling star program. Bed alarm on, bed in low position. Call light and personal belongings in reach. Patient uses call light appropriately. 4/13/2022 1626 by Sully Arango RN  Outcome: Ongoing     Problem: Skin Integrity:  Goal: Will show no infection signs and symptoms  Description: Will show no infection signs and symptoms  4/14/2022 0202 by Delio Dobbins  Outcome: Ongoing  4/13/2022 1626 by Sully Arango RN  Outcome: Ongoing  Goal: Absence of new skin breakdown  Description: Absence of new skin breakdown  4/14/2022 0202 by Veronica PLASCENCIA  Outcome: Ongoing  Note: No new signs or symptoms of skin breakdown noted this shift, encouraging patient to turn and reposition self in bed q2h    4/13/2022 1626 by Sully Arango RN  Outcome: Ongoing     Problem: Confusion - Acute:  Goal: Absence of continued neurological deterioration signs and symptoms  Description: Absence of continued neurological deterioration signs and symptoms  4/14/2022 0202 by Veronica PLASCENCIA  Outcome: Ongoing  4/13/2022 1626 by Sully Arango RN  Outcome: Ongoing  Goal: Mental status will be restored to baseline  Description: Mental status will be restored to baseline  4/14/2022 0202 by Veronica PLASCENCIA  Outcome: Ongoing  Note: Patient has remained alert and oriented x4 this shift.    4/13/2022 1626 by Sully Arango RN  Outcome: Ongoing     Problem: Discharge Planning:  Goal: Ability to perform activities of daily living will improve  Description: Ability to perform activities of daily living will improve  4/14/2022 0202 by Veronica PLASCENCIA  Outcome: unable to do this herself. She barely has strength to grab hold of the upper side rails. 4/13/2022 1626 by Gorman Oppenheim, RN  Outcome: Ongoing     Problem: Sensory Perception - Impaired:  Goal: Demonstrations of improved sensory functioning will increase  Description: Demonstrations of improved sensory functioning will increase  4/14/2022 0202 by Martinez So  Outcome: Ongoing  4/13/2022 1626 by Gorman Oppenheim, RN  Outcome: Ongoing  Goal: Decrease in sensory misperception frequency  Description: Decrease in sensory misperception frequency  4/14/2022 0202 by Martinez So  Outcome: Ongoing  4/13/2022 1626 by Gorman Oppenheim, RN  Outcome: Ongoing  Goal: Able to refrain from responding to false sensory perceptions  Description: Able to refrain from responding to false sensory perceptions  4/14/2022 0202 by Martinez So  Outcome: Ongoing  4/13/2022 1626 by Gorman Oppenheim, RN  Outcome: Ongoing  Goal: Demonstrates accurate environmental perceptions  Description: Demonstrates accurate environmental perceptions  4/14/2022 0202 by Martinez So  Outcome: Ongoing  4/13/2022 1626 by Gorman Oppenheim, RN  Outcome: Ongoing  Goal: Able to distinguish between reality-based and nonreality-based thinking  Description: Able to distinguish between reality-based and nonreality-based thinking  4/14/2022 0202 by Martinez So  Outcome: Ongoing  4/13/2022 1626 by Gorman Oppenheim, RN  Outcome: Ongoing  Goal: Able to interrupt nonreality-based thinking  Description: Able to interrupt nonreality-based thinking  4/14/2022 0202 by Martinez So  Outcome: Ongoing  4/13/2022 1626 by Gorman Oppenheim, RN  Outcome: Ongoing     Problem: Sleep Pattern Disturbance:  Goal: Appears well-rested  Description: Appears well-rested  4/14/2022 0202 by Jaden PLASCENCIA  Outcome: Ongoing  Note: Patient has dozed off and on this shift. At this time she is asleep.    4/13/2022 1626 by Gorman Oppenheim, RN  Outcome: Ongoing     Problem: Nutrition  Goal: Optimal nutrition therapy  4/14/2022 0202 by Isacc PLASCENCIA  Outcome: Ongoing  Note: Patient's diet was upgraded to moist and minced today. 4/13/2022 1626 by Delio León RN  Outcome: Ongoing     Problem: DISCHARGE BARRIERS  Goal: Patient's continuum of care needs are met  4/14/2022 0202 by Isacc PLASCENCIA  Outcome: Ongoing  Note: Case management working with patient and family to assure all patient's needs are met upon discharge. 4/13/2022 1626 by Delio León RN  Outcome: Ongoing     Problem: Pain:  Goal: Pain level will decrease  Description: Pain level will decrease  Outcome: Ongoing  Note: Patient complained of abdominal pain earlier and was given acetaminophen. Patient reported medication was effective in relieving her pain. Goal: Control of acute pain  Description: Control of acute pain  Outcome: Ongoing  Goal: Control of chronic pain  Description: Control of chronic pain  Outcome: Ongoing     Care plan reviewed with patient. Patient verbalize understanding of the plan of care and contribute to goal setting.

## 2022-04-14 NOTE — PROGRESS NOTES
Hospitalist Progress Note      Patient:  Sonido Cherry    Unit/Bed:6K-18/018-A  YOB: 1950  MRN: [id number]   Acct: [de-identified]   PCP: Sameer Espino MD  Date of Admission: 4/11/2022    Assessment/Plan:    1. ALVIN on CKD stage III, worsening: unclear etiology but some volume depletion contributing in additional to diuretic use per Nephro who has been consulted. Renal US without acute abnormalities. Aggressive IVFs. Avoid nephrotoxic agents / contrast.   4/14: pending Cr tomorrow may have temporary dialysis catheter and RRT   2. Hyperkalemia, resolved: K noted at 5.3, on Lokelma. Continue with IVF. Nephrology following. Check daily BMP. 3. Dislodged L nephrostomy tube s/p nephrostogram: no indication of dislodgement on CT A/P, IR has been consulted for assessment. Previously dislodged and replaced multiple times, last being 3/14/22.   4/13: Patient to undergo nephrostogram today  4/14: appropriate placement, continue flushes, red/brownish blood in back  4. L staghorn calculus / bleeding L nephrostomy tube: Urology has been consulted. Repeat CT A/P from 4/12 shows L sided perinephric stranding and fluid collection adjacent to tube which is felt to likely be subcapsular hematoma. Continue with Q8 H&H checks. 4. Acute cystitis: chronic seamna catheter, UA shows few bacteria, moderate leukocyte esterase and 50-75 with clumps WBC. Urine culture returned growing nonfermenting gram-negative bacilli, enteric gram-negative bacilli, and gram-positive cocci. Pt denies any burning at the site of her catheter. Will discuss with Urology if treatment is indicated at this time. Afebrile, no leukocytosis. 4/14: urine gx returned growing Pseudomonas or [place], Klebsiella pneumoniae and Enterococcus faecalis group D. Will consult ID for antibiotic recommendations/duration. 5. Acute on chronic normocytic anemia: Hg 7.5 on admission with baseline Hg 8.0s-9.0s.  [employer] red blood on nephrotomy tube. Repeated Hg 6.8. Transfuse 1 Unit RBC. Continue H& H q8hrs, transfusion if Hgb < 7  5. Mild-moderate dysphagia: seen by SLP, underwent MBS. Recommended honey thick liquids, no straws, and soft and bite sized. 6. Elevated troponin: likely related to CKD, denies CP, EKG showed sinus tach with nonspecific ST/T wave change. 6. Chronic HFpEF without decompensation: TOBIAS 10/2021 EF 60% with trace TR. On Bumex 0.5 mg daily which is held due to ALVIN and dehydration  5. Essential HTN:slightly elevated, continue with home meds and monitor closely   6. Hx stage III decub ulcer s/p diverting colostomy 1/27/22: Wound care consulted and following  7. Anxiety: Continue home medications    Chief Complaint: Abnormal lab    Initial H and P:-    \"Kenzie Gallagher is a 73 y.o., , female presented to [facility] ED for abnormal lab from nursing home. Past medical history significant with chronic diastolic heart failure, obstructive staghorn calculus of the left kidney not a candidate for stone treatment which nephrostomy tube was placed, stage III decubitus ulcer, CKD stage III, general anxiety disorder. Visit patient awake alert and oriented to time person and place. Patient denies any chest pain, chest discomfort, abdominal pain, back pain, neck pain. Patient sister on the bedside report that patient had been having bright red blood in nephrostomy tube over last 3 days. Today patient nursing home notify abnormal lab work which patient was transferred to [facility] ED.     Patient is chronic critical ill with PEG tube for nutritional, divers colostomy bag for stage III decubitus ulcer, nephrostomy tube drain bright red blood. Patient living in nursing home, denies alcohol use, tobacco use.     Patient was admitted and managed for ALVIN secondary to dehydration and possible obstruction.      ED work-up: Afebrile with BP elevate. BMP show hypokalemia with potassium 5.4, serum osmolarity elevate 322.1.  EKG showed sinus tachycardia with PVC, St & T wave abnormality. Patient is asymptomatic for chest pain. CBC showed anemia with hemoglobin 7.5 which reduced compared to her baseline at 8 on 9. \"    4/13: pt doing okay, sitting up in bed with family at bedside. Denies fever/chills/CP/SOB. Still has dark blood noted in her nephrostomy bag. Denies any burning at the site of her catheter. Subjective (past 24 hours):   4/14: pt seen having returned from [facility] today and was noted to have episode of nausea/choking / coughing and a small amount of whitish emesis (barium). Pt denied abd pain and denied any SOB at that time. Past medical history, family history, social history and allergies reviewed again and is unchanged since admission. ROS (All review of systems completed. Pertinent positives noted.  Otherwise All other systems reviewed and negative.)     Medications:  Reviewed    Infusion Medications    dextrose      sodium bicarbonate infusion 100 mL/hr at 04/14/22 1113    sodium chloride Stopped (04/13/22 1648)    sodium chloride       Scheduled Medications    miconazole   Topical BID    ciprofloxacin  400 mg IntraVENous Q12H    [Held by provider] aspirin  81 mg Oral Daily    [Held by provider] bumetanide  0.5 mg Oral Daily    famotidine  20 mg PEG Tube Every Other Day    ferrous sulfate  325 mg Oral Every Other Day    FLUoxetine  40 mg Oral Daily    metoprolol tartrate  12.5 mg Oral BID    sodium chloride flush  5-40 mL IntraVENous 2 times per day     PRN Meds: ondansetron, glucagon (rDNA), dextrose, glucose, dextrose bolus (hypoglycemia) **OR** dextrose bolus (hypoglycemia), acetaminophen **OR** acetaminophen, melatonin, sodium chloride flush, sodium chloride, sodium chloride      Intake/Output Summary (Last 24 hours) at 4/14/2022 1529  Last data filed at 4/13/2022 1606  Gross per 24 hour   Intake 0 ml   Output 750 ml   Net -750 ml       Diet:  ADULT TUBE FEEDING; PEG; Renal Formula; Cyclic; 40; 6:00 PM; 6:00 AM; 30; Other (specify); 30 mls before and after cyclic feeding  ADULT DIET; Dysphagia - Soft and Bite Sized; Moderately Thick (Honey); No Drinking Straws    Exam:  /63   Pulse 78   Temp 98.2 °F (36.8 °C) (Oral)   Resp 16   Ht 4' 9\" (1.448 m)   Wt 150 lb 2.1 oz (68.1 kg)   SpO2 90%   BMI 32.49 kg/m²   General appearance: No apparent distress, appears stated age and cooperative. HEENT: Pupils equal, round, and reactive to light. Conjunctivae/corneas clear. Neck: Supple, with full range of motion. No jugular venous distention. Trachea midline. Respiratory:  Normal respiratory effort. Clear to auscultation, bilaterally without Rales/Wheezes/Rhonchi. Cardiovascular: Regular rate and rhythm with normal S1/S2 without murmurs, rubs or gallops. Abdomen: Soft, non-tender, non-distended with normal bowel sounds. Musculoskeletal: passive and active ROM x 4 extremities. Skin: Skin color, texture, turgor normal.  No rashes or lesions. Neurologic:  Neurovascularly intact without any focal sensory/motor deficits. Cranial nerves: II-XII intact, grossly non-focal.  Psychiatric: Alert and oriented, thought content appropriate, normal insight  Capillary Refill: Brisk,< 3 seconds   Peripheral Pulses: +2 palpable, equal bilaterally     Labs:   Recent Labs     04/11/22  2030 04/12/22  0336 04/13/22  0337 04/13/22  1143 04/13/22  1924 04/14/22  0353 04/14/22  1111   WBC 10.7  --  10.8  --   --  9.5  --    HGB 7.5*   < > 8.4*   < > 8.3* 7.8* 7.9*   HCT 25.2*   < > 28.1*   < > 27.6* 26.1* 25.4*     --  249  --   --  235  --     < > = values in this interval not displayed.      Recent Labs     04/12/22  0832 04/12/22  0832 04/12/22  1628 04/13/22  0337 04/14/22  0353     --   --  141 140   K 5.3*  5.4*   < > 5.3* 5.3* 4.8     --   --  106 104   CO2 17*  --   --  15* 17*   *  --   --  157* 150*   CREATININE 4.9*  --   --  5.2* 5.3*   CALCIUM 8.8  --   --  8.8 8.3*   PHOS  --   -- --  8.3* 8.0*    < > = values in this interval not displayed. Recent Labs     04/11/22 2030 04/12/22  0832 04/14/22  0353   AST 25 19 16   ALT 25 20 17   BILIDIR <0.2  --   --    BILITOT <0.2* <0.2* 0.2*   ALKPHOS 79 75 74     No results for input(s): INR in the last 72 hours. No results for input(s): Amanda Augusta in the last 72 hours. Microbiology:    Blood culture #1:   Lab Results   Component Value Date    BC No growth-preliminary No growth  01/21/2022       Blood culture #2:No results found for: Tatyjosé Gandhi    Organism:  Lab Results   Component Value Date    ORG Pseudomonas aeruginosa 04/12/2022    ORG Klebsiella pneumoniae 04/12/2022    ORG Enterococcus faecalis - (Group D) 04/12/2022         Lab Results   Component Value Date    LABGRAM  02/22/2022     Many segmented neutrophils observed. No epithelial cells observed. No bacteria seen. MRSA culture only:No results found for: [address] [place] [address]    Urine culture:   Lab Results   Component Value Date    LABURIN No growth-preliminary  01/20/2022    LABURIN [place] count: 1,000 CFU/mL  01/20/2022       Respiratory culture: No results found for: CULTRESP    Aerobic and Anaerobic :  Lab Results   Component Value Date    LABAERO  02/22/2022     No growth-preliminary Current antibiotic therapy ineffective in vitro for at least one of culture isolates. LABAERO light growth  02/22/2022    LABAERO  02/22/2022     very light growth Isolates of Methicillin Resistant Staphylococcus coagulase negative (MRSE) do NOT require CONTACT isolation. Methicillin(Oxacillin)resistant strains of staphylococci (MRSA)or(MRSE)should be considered resistant to all classes of cephalosporins, penems and beta-lactams.       Lab Results   Component Value Date    LABANAE  02/22/2022     No anaerobes isolated- preliminary No anaerobes isolated        Urinalysis:      Lab Results   Component Value Date    NITRU NEGATIVE 04/12/2022    WBCUA 50-75W/CLUMPS 04/12/2022    BACTERIA FEW 04/12/2022    RBCUA > 200 04/12/2022    BLOODU LARGE 04/12/2022    SPECGRAV 1.014 01/20/2022    GLUCOSEU NEGATIVE 04/12/2022       Radiology:  XR CHEST PORTABLE   Final Result   1. Left lower lung atelectasis/infiltrate. 2. Mild stable cardiomegaly. **This report has been created using voice recognition software. It may contain minor errors which are inherent in voice recognition technology. **      Final report electronically signed by Dr. Milton Rivas on 4/14/2022 11:54 AM      FL MODIFIED BARIUM SWALLOW W VIDEO   Final Result   Laryngeal penetration and aspiration with thin consistency. Laryngeal penetration with nectar thick. Recommendations available from speech therapy            **This report has been created using voice recognition software. It may contain minor errors which are inherent in voice recognition technology. **      Final report electronically signed by Dr. Saravanan Jones on 4/14/2022 12:39 PM      IR GUIDED NEPHROSTOGRAM/URETEROGRAM EXISTING ACCESS   Final Result   The tip of the nephrostomy tube is coiled in the left renal collecting system. **This report has been created using voice recognition software. It may contain minor errors which are inherent in voice recognition technology. **         Final report electronically signed by Dr Tori Day on 4/14/2022 9:08 AM      CT ABDOMEN PELVIS WO CONTRAST Additional Contrast? None   Final Result   1. Small bilateral pleural effusions with adjacent atelectasis/infiltrate. 2. Left-sided perinephric stranding and fluid collection adjacent to a left-sided nephrostomy tube, likely a subcapsular hematoma. Stable calcifications in the left kidney. 3. Cholelithiasis. 4. Sacral decubitus ulcer. Final report electronically signed by Dr. Milton Rivas on 4/12/2022 5:00 PM      US RENAL COMPLETE   Final Result   1.  Significantly limited exam.   2. Increased renal cortical echogenicity bilaterally, compatible with medical renal disease. 3. A left-sided nephrostomy tube is partially visualized. 4. Multiple left-sided renal calculi. 5. Mild left-sided caliectasis. The left renal pelvis is nondilated. 6. Nonspecific perinephric or subcapsular fluid is noted adjacent to the    left kidney, measuring 5.3 x 2.1 x 1.4 cm. This document has been electronically signed by: Maame Masters M.D. on    04/12/2022 02:30 AM        CT ABDOMEN PELVIS WO CONTRAST Additional Contrast? None    Result Date: 4/12/2022  PROCEDURE: CT ABDOMEN PELVIS WO CONTRAST CLINICAL INFORMATION: Left staghorn calculus TECHNIQUE: CT of the abdomen and pelvis was performed without use of intravenous contrast. Axial images as well as sagittal and coronal reconstructions were obtained. All CT scans at this facility use dose modulation, iterative reconstruction, and/or weight-based dosing when appropriate to reduce radiation dose to as low as reasonably achievable. COMPARISON: CT abdomen and pelvis 3/13/2022 FINDINGS: Lower thorax: There are small bilateral pleural effusions. There is adjacent lung consolidation are present. The heart is enlarged. Abdomen: Evaluation is limited due to absence of contrast. There is no free intraperitoneal air. A gastrostomy tube is in the stomach. A left mid abdominal ostomy is stable and contains loops of bowel. There is no bowel obstruction. Calcifications are present in the lumen of the gallbladder. A nephrostomy tube is now present in the left kidney. Fluid adjacent to the tube and kidney has noncontrast mean Hounsfield units of 12 (image 34). Subcapsular fluid measures up to 2.2 cm in width (image 38). Perinephric stranding is again noted. Calcifications in the left kidney are stable. Left-sided hydronephrosis is not significantly changed. Hypoattenuating lesions in the kidneys may be cysts but are incompletely characterized on the current study. There  are calcified granulomas in the liver and spleen. Atherosclerotic calcifications are present in the abdominal aorta without evidence of aneurysm. There is no mesenteric or retroperitoneal lymphadenopathy. Degenerative changes are seen in the thoracolumbar spine without evidence of aggressive osseous lesions. Pelvis: There is a Padilla catheter in a nondistended urinary bladder, likely accounting for gas in the bladder. Platelets are present in the pelvis. There are calcifications in the uterus. There is no pelvic or inguinal lymphadenopathy. There is persistent subcutaneous tissue irregularity posterior to the sacrum. Degenerative changes are present in the pelvis without evidence of aggressive osseous lesions. 1. Small bilateral pleural effusions with adjacent atelectasis/infiltrate. 2. Left-sided perinephric stranding and fluid collection adjacent to a left-sided nephrostomy tube, likely a subcapsular hematoma. Stable calcifications in the left kidney. 3. Cholelithiasis. 4. Sacral decubitus ulcer. Final report electronically signed by Dr. Julia Landaverde on 4/12/2022 5:00 PM    US RENAL COMPLETE    Result Date: 4/12/2022  RENAL ULTRASOUND COMPARISON: 1/3/2022. FINDINGS: Examination is significantly limited due to patient positioning and immobility. Increased renal cortical echogenicity is noted bilaterally, compatible with medical renal disease. Multiple shadowing calculi are noted within the left kidney. For example there is a 2.7 cm stone in the left kidney on image 41. Left-sided nephrostomy tube is partially visualized. Mild caliectasis is noted in the left kidney. Left renal pelvis is nondilated. Nonspecific perinephric or subcapsular fluid is noted adjacent to the left kidney, measuring 5.3 x 2.1 x 1.4 cm on image 49. Urinary bladder contains a Padilla catheter. 1. Significantly limited exam. 2. Increased renal cortical echogenicity bilaterally, compatible with medical renal disease. 3. A left-sided nephrostomy tube is partially visualized.  4. Multiple left-sided renal calculi. 5. Mild left-sided caliectasis. The left renal pelvis is nondilated. 6. Nonspecific perinephric or subcapsular fluid is noted adjacent to the left kidney, measuring 5.3 x 2.1 x 1.4 cm.  This document has been electronically signed by: Yovany Coombs M.D. on 04/12/2022 02:30 AM      Electronically signed by Francisco Gutierrze PA-C on 4/14/2022 at 3:29 PM

## 2022-04-14 NOTE — PROGRESS NOTES
[facility]         Renal Inpatient Follow-Up note         4/14/2022 11:32 AM    Pt Name:   Afia Bateman  YOB: 1950  Attending:   Claudia Arroyo PA-C    Chief Complaint : Afia Bateman is a 73 y.o. 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 No distress  Feels ok. No cp or SOB. Still having fluid in the nephrostomy tube. Nephrostogram done yesterday shows it is in appropriate position  Urine output slowed down     Scheduled Medications :    miconazole   Topical BID    ciprofloxacin  400 mg IntraVENous Q12H    [Held by provider] aspirin  81 mg Oral Daily    [Held by provider] bumetanide  0.5 mg Oral Daily    famotidine  20 mg PEG Tube Every Other Day    ferrous sulfate  325 mg Oral Every Other Day    FLUoxetine  40 mg Oral Daily    metoprolol tartrate  12.5 mg Oral BID    sodium chloride flush  5-40 mL IntraVENous 2 times per day      dextrose      sodium bicarbonate infusion 100 mL/hr at 04/14/22 1113    sodium chloride Stopped (04/13/22 1648)    sodium chloride         Vitals :  /62   Pulse 79   Temp 98.6 °F (37 °C) (Axillary)   Resp 18   Ht 4' 9\" (1.448 m)   Wt 150 lb 2.1 oz (68.1 kg)   SpO2 96%   BMI 32.49 kg/m²     24HR INTAKE/OUTPUT:      Intake/Output Summary (Last 24 hours) at 4/14/2022 1132  Last data filed at 4/13/2022 1606  Gross per 24 hour   Intake 0 ml   Output 750 ml   Net -750 ml     Last 3 weights  Wt Readings from Last 3 Encounters:   04/14/22 150 lb 2.1 oz (68.1 kg)   03/29/22 134 lb (60.8 kg)   03/13/22 133 lb (60.3 kg)           Physical Exam :  General -- well developed and well nourished  HEENT-normal external appearance of both ears and nose. Mouth/throat  - oropharynx appears to be clear and moist  Eyes-conjunctiva normal no icterus or pallor. Neck-normal range of motion supple no JVD.   Lungs -- clear  Heart -- S1, S2 heard, JVD - no  Abdomen - soft, non-tender  Extremities -- edema --no significant edema noted  CNS - awake and alert  Psychiatric-mood and memory appears normal         Last 3 CBC   Recent Labs     04/11/22 2030 04/12/22  0336 04/13/22  0337 04/13/22  1143 04/13/22  1924 04/14/22  0353 04/14/22  1111   WBC 10.7  --  10.8  --   --  9.5  --    RBC 2.67*  --  2.96*  --   --  2.74*  --    HGB 7.5*   < > 8.4*   < > 8.3* 7.8* 7.9*   HCT 25.2*   < > 28.1*   < > 27.6* 26.1* 25.4*     --  249  --   --  235  --     < > = values in this interval not displayed. Last 3 CMP  Recent Labs     04/11/22 2030 04/12/22  0224 04/12/22  0832 04/12/22  0832 04/12/22  1628 04/13/22  0337 04/14/22  0353   *  --  136  --   --  141 140   K 5.4*   < > 5.3*  5.4*   < > 5.3* 5.3* 4.8   CL 99  --  102  --   --  106 104   CO2 18*  --  17*  --   --  15* 17*   *  --  160*  --   --  157* 150*   CREATININE 4.9*  --  4.9*  --   --  5.2* 5.3*   CALCIUM 9.2  --  8.8  --   --  8.8 8.3*   LABALBU 2.5*  --  2.4*  --   --  2.2* 2.2*   BILITOT <0.2*  --  <0.2*  --   --   --  0.2*    < > = values in this interval not displayed. ASSESSMENT / Plan   1. Renal -acute kidney injury on chronic kidney disease stage III exact etiology is not clear however it appears most likely some component of volume depletion and the use of diuretic. ? Renal ultrasound scan does not show any acute abnormalities. ? Not much change he now BUN and creatinine. ? However patient urine output is also slowing down  ? If not much improvement tomorrow we will place a temporary catheter and start renal replacement therapy     2. Electrolytes -mild hyperkalemia improved with the bicarbonate infusion  3. Mild acidosis secondary to renal dysfunction continue sodium bicarbonate infusion for now appears to be getting better  4. Anemia probably due to bleeding through the nephrostomy tube. Status post blood transfusion.   5. Bleeding from the nephrostomy tube urology has been consulted status post nephrostogram appears to be in good position  6. Hx of chronic diastolic congestive heart failure appears to be well compensated hold diuretics for now  7. Essential hypertension-stable  8. Meds reviewed and discussed with patient and nursing staff      Dr. Malvin Schwab, MD, M,D.  [facility].

## 2022-04-15 ENCOUNTER — APPOINTMENT (OUTPATIENT)
Dept: INTERVENTIONAL RADIOLOGY/VASCULAR | Age: 72
DRG: 673 | End: 2022-04-15
Payer: MEDICARE

## 2022-04-15 ENCOUNTER — APPOINTMENT (OUTPATIENT)
Dept: GENERAL RADIOLOGY | Age: 72
DRG: 673 | End: 2022-04-15
Payer: MEDICARE

## 2022-04-15 LAB
ALBUMIN SERPL-MCNC: 2.1 G/DL (ref 3.5–5.1)
ALP BLD-CCNC: 73 U/L (ref 38–126)
ALT SERPL-CCNC: 15 U/L (ref 11–66)
ANION GAP SERPL CALCULATED.3IONS-SCNC: 17 MEQ/L (ref 8–16)
AST SERPL-CCNC: 14 U/L (ref 5–40)
BASOPHILS # BLD: 0.3 %
BASOPHILS ABSOLUTE: 0 THOU/MM3 (ref 0–0.1)
BILIRUB SERPL-MCNC: < 0.2 MG/DL (ref 0.3–1.2)
BUN BLDV-MCNC: 143 MG/DL (ref 7–22)
CALCIUM SERPL-MCNC: 8.3 MG/DL (ref 8.5–10.5)
CHLORIDE BLD-SCNC: 102 MEQ/L (ref 98–111)
CO2: 19 MEQ/L (ref 23–33)
CREAT SERPL-MCNC: 5 MG/DL (ref 0.4–1.2)
EOSINOPHIL # BLD: 6.4 %
EOSINOPHILS ABSOLUTE: 0.6 THOU/MM3 (ref 0–0.4)
ERYTHROCYTE [DISTWIDTH] IN BLOOD BY AUTOMATED COUNT: 16.1 % (ref 11.5–14.5)
ERYTHROCYTE [DISTWIDTH] IN BLOOD BY AUTOMATED COUNT: 57.1 FL (ref 35–45)
GFR SERPL CREATININE-BSD FRML MDRD: 9 ML/MIN/1.73M2
GLUCOSE BLD-MCNC: 104 MG/DL (ref 70–108)
GLUCOSE BLD-MCNC: 117 MG/DL (ref 70–108)
GLUCOSE BLD-MCNC: 128 MG/DL (ref 70–108)
GLUCOSE BLD-MCNC: 140 MG/DL (ref 70–108)
HCT VFR BLD CALC: 25.9 % (ref 37–47)
HCT VFR BLD CALC: 27.5 % (ref 37–47)
HEMOGLOBIN: 7.6 GM/DL (ref 12–16)
HEMOGLOBIN: 8.3 GM/DL (ref 12–16)
IMMATURE GRANS (ABS): 0.04 THOU/MM3 (ref 0–0.07)
IMMATURE GRANULOCYTES: 0.4 %
LYMPHOCYTES # BLD: 6.9 %
LYMPHOCYTES ABSOLUTE: 0.7 THOU/MM3 (ref 1–4.8)
MAGNESIUM: 1.5 MG/DL (ref 1.6–2.4)
MCH RBC QN AUTO: 28.4 PG (ref 26–33)
MCHC RBC AUTO-ENTMCNC: 29.3 GM/DL (ref 32.2–35.5)
MCV RBC AUTO: 96.6 FL (ref 81–99)
MONOCYTES # BLD: 9.8 %
MONOCYTES ABSOLUTE: 1 THOU/MM3 (ref 0.4–1.3)
NUCLEATED RED BLOOD CELLS: 0 /100 WBC
ORGANISM: ABNORMAL
PHOSPHORUS: 7.7 MG/DL (ref 2.4–4.7)
PLATELET # BLD: 225 THOU/MM3 (ref 130–400)
PMV BLD AUTO: 9 FL (ref 9.4–12.4)
POTASSIUM SERPL-SCNC: 4.2 MEQ/L (ref 3.5–5.2)
RBC # BLD: 2.68 MILL/MM3 (ref 4.2–5.4)
SEG NEUTROPHILS: 76.2 %
SEGMENTED NEUTROPHILS ABSOLUTE COUNT: 7.5 THOU/MM3 (ref 1.8–7.7)
SODIUM BLD-SCNC: 138 MEQ/L (ref 135–145)
TOTAL PROTEIN: 6.2 G/DL (ref 6.1–8)
URINE CULTURE REFLEX: ABNORMAL
WBC # BLD: 9.8 THOU/MM3 (ref 4.8–10.8)

## 2022-04-15 PROCEDURE — 5A1D70Z PERFORMANCE OF URINARY FILTRATION, INTERMITTENT, LESS THAN 6 HOURS PER DAY: ICD-10-PCS | Performed by: INTERNAL MEDICINE

## 2022-04-15 PROCEDURE — 6370000000 HC RX 637 (ALT 250 FOR IP): Performed by: STUDENT IN AN ORGANIZED HEALTH CARE EDUCATION/TRAINING PROGRAM

## 2022-04-15 PROCEDURE — 2580000003 HC RX 258: Performed by: STUDENT IN AN ORGANIZED HEALTH CARE EDUCATION/TRAINING PROGRAM

## 2022-04-15 PROCEDURE — 77001 FLUOROGUIDE FOR VEIN DEVICE: CPT

## 2022-04-15 PROCEDURE — 0JH63XZ INSERTION OF TUNNELED VASCULAR ACCESS DEVICE INTO CHEST SUBCUTANEOUS TISSUE AND FASCIA, PERCUTANEOUS APPROACH: ICD-10-PCS | Performed by: INTERNAL MEDICINE

## 2022-04-15 PROCEDURE — 99233 SBSQ HOSP IP/OBS HIGH 50: CPT | Performed by: INTERNAL MEDICINE

## 2022-04-15 PROCEDURE — 1200000000 HC SEMI PRIVATE

## 2022-04-15 PROCEDURE — 36556 INSERT NON-TUNNEL CV CATH: CPT

## 2022-04-15 PROCEDURE — 80053 COMPREHEN METABOLIC PANEL: CPT

## 2022-04-15 PROCEDURE — 84100 ASSAY OF PHOSPHORUS: CPT

## 2022-04-15 PROCEDURE — 83735 ASSAY OF MAGNESIUM: CPT

## 2022-04-15 PROCEDURE — 2709999900 IR FLUORO GUIDED NEEDLE PLACEMENT

## 2022-04-15 PROCEDURE — 85014 HEMATOCRIT: CPT

## 2022-04-15 PROCEDURE — 73620 X-RAY EXAM OF FOOT: CPT

## 2022-04-15 PROCEDURE — 2500000003 HC RX 250 WO HCPCS: Performed by: INTERNAL MEDICINE

## 2022-04-15 PROCEDURE — 82948 REAGENT STRIP/BLOOD GLUCOSE: CPT

## 2022-04-15 PROCEDURE — 85018 HEMOGLOBIN: CPT

## 2022-04-15 PROCEDURE — 92526 ORAL FUNCTION THERAPY: CPT

## 2022-04-15 PROCEDURE — 94760 N-INVAS EAR/PLS OXIMETRY 1: CPT

## 2022-04-15 PROCEDURE — 90935 HEMODIALYSIS ONE EVALUATION: CPT

## 2022-04-15 PROCEDURE — 99233 SBSQ HOSP IP/OBS HIGH 50: CPT | Performed by: PHYSICIAN ASSISTANT

## 2022-04-15 PROCEDURE — 2580000003 HC RX 258: Performed by: INTERNAL MEDICINE

## 2022-04-15 PROCEDURE — 6370000000 HC RX 637 (ALT 250 FOR IP): Performed by: INTERNAL MEDICINE

## 2022-04-15 PROCEDURE — 36415 COLL VENOUS BLD VENIPUNCTURE: CPT

## 2022-04-15 PROCEDURE — 02H633Z INSERTION OF INFUSION DEVICE INTO RIGHT ATRIUM, PERCUTANEOUS APPROACH: ICD-10-PCS | Performed by: INTERNAL MEDICINE

## 2022-04-15 PROCEDURE — 85025 COMPLETE CBC W/AUTO DIFF WBC: CPT

## 2022-04-15 PROCEDURE — 6360000002 HC RX W HCPCS: Performed by: PHYSICIAN ASSISTANT

## 2022-04-15 PROCEDURE — 76937 US GUIDE VASCULAR ACCESS: CPT

## 2022-04-15 RX ORDER — SODIUM HYPOCHLORITE 1.25 MG/ML
SOLUTION TOPICAL DAILY
Status: DISCONTINUED | OUTPATIENT
Start: 2022-04-15 | End: 2022-04-28 | Stop reason: HOSPADM

## 2022-04-15 RX ORDER — MAGNESIUM SULFATE IN WATER 40 MG/ML
2000 INJECTION, SOLUTION INTRAVENOUS ONCE
Status: COMPLETED | OUTPATIENT
Start: 2022-04-15 | End: 2022-04-15

## 2022-04-15 RX ADMIN — SODIUM CHLORIDE, PRESERVATIVE FREE 10 ML: 5 INJECTION INTRAVENOUS at 21:44

## 2022-04-15 RX ADMIN — METOPROLOL TARTRATE 12.5 MG: 25 TABLET, FILM COATED ORAL at 21:43

## 2022-04-15 RX ADMIN — SODIUM CHLORIDE, PRESERVATIVE FREE 10 ML: 5 INJECTION INTRAVENOUS at 08:26

## 2022-04-15 RX ADMIN — ONDANSETRON 4 MG: 2 INJECTION INTRAMUSCULAR; INTRAVENOUS at 17:29

## 2022-04-15 RX ADMIN — FLUOXETINE 40 MG: 20 CAPSULE ORAL at 10:12

## 2022-04-15 RX ADMIN — MAGNESIUM SULFATE HEPTAHYDRATE 2000 MG: 40 INJECTION, SOLUTION INTRAVENOUS at 18:35

## 2022-04-15 RX ADMIN — METOPROLOL TARTRATE 12.5 MG: 25 TABLET, FILM COATED ORAL at 10:12

## 2022-04-15 RX ADMIN — CIPROFLOXACIN 400 MG: 2 INJECTION, SOLUTION INTRAVENOUS at 22:59

## 2022-04-15 RX ADMIN — MICONAZOLE NITRATE: 20 POWDER TOPICAL at 08:26

## 2022-04-15 RX ADMIN — SODIUM CHLORIDE 200 ML: 9 INJECTION, SOLUTION INTRAVENOUS at 22:57

## 2022-04-15 RX ADMIN — SODIUM BICARBONATE: 84 INJECTION, SOLUTION INTRAVENOUS at 17:36

## 2022-04-15 RX ADMIN — FAMOTIDINE 20 MG: 20 TABLET ORAL at 10:12

## 2022-04-15 RX ADMIN — CIPROFLOXACIN 400 MG: 2 INJECTION, SOLUTION INTRAVENOUS at 16:50

## 2022-04-15 RX ADMIN — MICONAZOLE NITRATE: 20 POWDER TOPICAL at 23:01

## 2022-04-15 RX ADMIN — DAKIN'S SOLUTION 0.125% (QUARTER STRENGTH): 0.12 SOLUTION at 15:58

## 2022-04-15 ASSESSMENT — PAIN SCALES - GENERAL
PAINLEVEL_OUTOF10: 0

## 2022-04-15 NOTE — PROGRESS NOTES
12 Pt in specials radiology for temporary dialysis catheter insertion. Explained procedure to pt and pt verbalizes understanding. Consent signed. 1110 Moved to table and attached to monitor. 1113 Pt had episode of dry heaves. SPO2 70's. O2 attached 6 liters per nasal cannula. Pt answers \"OK\" when asked how she is doing. Color pale. 46 Pt assisted to sit up. SPO2 increased to 80's. 1115 SPO2 100%. NC backed down to 4 liters. Attached to monitor. Sinus rhythm noted. 1120 Right neck prepped and draped. 1123 SPO2 100%. O2 decreased to 2 liters per nasal cannula. 1124 Catheter sutured to right neck. 1126 Bio-patch placed at catheter site with op-site dressing. Site without redness, swelling or hematoma. 1131 Pt positioned on cart for comfort. O2 off and SPO2 93%. Pt offers no complaints. 1135 Pt transferred to IP dialysis. Report to [employer].

## 2022-04-15 NOTE — PROGRESS NOTES
[facility]         Renal Inpatient Follow-Up note         4/15/2022 8:55 AM    Pt Name:   Mlidred El  YOB: 1950  Attending:   Elijah Peralta PA-C    Chief Complaint : Mildred El is a 73 y.o. 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 No distress  Feels ok. No cp or SOB. Still having fluid in the nephrostomy tube. Slightly more urine output but not much improvement in the renal function     Scheduled Medications :    miconazole   Topical BID    ciprofloxacin  400 mg IntraVENous Q12H    [Held by provider] aspirin  81 mg Oral Daily    [Held by provider] bumetanide  0.5 mg Oral Daily    famotidine  20 mg PEG Tube Every Other Day    ferrous sulfate  325 mg Oral Every Other Day    FLUoxetine  40 mg Oral Daily    metoprolol tartrate  12.5 mg Oral BID    sodium chloride flush  5-40 mL IntraVENous 2 times per day      dextrose      sodium bicarbonate infusion 100 mL/hr at 04/14/22 2259    sodium chloride Stopped (04/13/22 1648)    sodium chloride         Vitals :  BP (!) 146/68   Pulse 88   Temp 98.5 °F (36.9 °C) (Oral)   Resp 16   Ht 4' 9\" (1.448 m)   Wt 155 lb 6.8 oz (70.5 kg)   SpO2 91%   BMI 33.63 kg/m²     24HR INTAKE/OUTPUT:      Intake/Output Summary (Last 24 hours) at 4/15/2022 0855  Last data filed at 4/14/2022 2104  Gross per 24 hour   Intake 210 ml   Output 1315 ml   Net -1105 ml     Last 3 weights  Wt Readings from Last 3 Encounters:   04/15/22 155 lb 6.8 oz (70.5 kg)   03/29/22 134 lb (60.8 kg)   03/13/22 133 lb (60.3 kg)           Physical Exam :  General -- well developed and well nourished  HEENT-normal external appearance of both ears and nose. Mouth/throat  - oropharynx appears to be clear and moist  Eyes-conjunctiva normal no icterus or pallor. Neck-normal range of motion supple no JVD.   Lungs -- clear  Heart -- S1, S2 heard, JVD - no  Abdomen - soft, non-tender  Extremities -- edema --no significant edema noted  CNS - awake and alert  Psychiatric-mood and memory appears normal         Last 3 CBC   Recent Labs     04/13/22  0337 04/13/22  1143 04/14/22  0353 04/14/22  0353 04/14/22  1111 04/14/22  1922 04/15/22  0344   WBC 10.8  --  9.5  --   --   --  9.8   RBC 2.96*  --  2.74*  --   --   --  2.68*   HGB 8.4*   < > 7.8*   < > 7.9* 7.4* 7.6*   HCT 28.1*   < > 26.1*   < > 25.4* 24.6* 25.9*     --  235  --   --   --  225    < > = values in this interval not displayed. Last 3 CMP  Recent Labs     04/13/22 0337 04/14/22  0353 04/15/22  0344    140 138   K 5.3* 4.8 4.2    104 102   CO2 15* 17* 19*   * 150* 143*   CREATININE 5.2* 5.3* 5.0*   CALCIUM 8.8 8.3* 8.3*   LABALBU 2.2* 2.2* 2.1*   BILITOT  --  0.2* <0.2*             ASSESSMENT / Plan   1. Renal -acute kidney injury on chronic kidney disease stage III exact etiology is not clear however it appears most likely some component of volume depletion and the use of diuretic. She is also probably has some septic ATN  ? Renal ultrasound scan does not show any acute abnormalities. ? Not much change he now BUN and creatinine. ? Discussed with the patient and will get a temporary dialysis catheter and start the patient on renal replacement therapy.     2. Electrolytes -normal limits  3. Mild acidosis secondary to renal dysfunction continue sodium bicarbonate infusion for now appears to be getting better should also be corrected with dialysis  4. Anemia probably due to bleeding through the nephrostomy tube. Status post blood transfusion. 5. Bleeding from the nephrostomy tube urology has been consulted status post nephrostogram appears to be in good position  6. Hx of chronic diastolic congestive heart failure appears to be well compensated hold diuretics for now  7. Essential hypertension-stable  8.  Meds reviewed and discussed with patient and nursing staff      Dr. Christella Gilford, MD, M,D.  [facility].

## 2022-04-15 NOTE — CONSULTS
MD at [facility]    COLECTOMY N/A 1/27/2022    LAPAROSCOPY WITH DIVERTING LOOP COLOSTOMY performed by Saud Montano MD at [facility] N/A 11/24/2021    EGD PEG TUBE PLACEMENT performed by Tiffanie Muniz MD at [facility]    IR [address]  11/26/2021    IR CHEST TUBE INSERTION 11/26/2021 [facility]    IR NEPHROSTOMY PERCUTANEOUS LEFT  11/1/2021    IR NEPHROSTOMY PERCUTANEOUS LEFT 11/1/2021 Jonny Myers MD [facility]    IR NEPHROSTOMY PERCUTANEOUS LEFT  3/14/2022    IR NEPHROSTOMY PERCUTANEOUS LEFT 3/14/2022 Jarret Deal MD [facility]    PRESSURE ULCER DEBRIDEMENT Right 8/6/2021    EXCISION RIGHT BUTTOCK WOUND AND CLOSURE performed by Shanita Zambrano MD at [address]:       Scheduled Meds:   miconazole   Topical BID    ciprofloxacin  400 mg IntraVENous Q12H    [Held by provider] aspirin  81 mg Oral Daily    [Held by provider] bumetanide  0.5 mg Oral Daily    famotidine  20 mg PEG Tube Every Other Day    ferrous sulfate  325 mg Oral Every Other Day    FLUoxetine  40 mg Oral Daily    metoprolol tartrate  12.5 mg Oral BID    sodium chloride flush  5-40 mL IntraVENous 2 times per day     Continuous Infusions:   dextrose      sodium bicarbonate infusion 100 mL/hr at 04/14/22 2259    sodium chloride Stopped (04/13/22 1648)    sodium chloride       PRN Meds:ondansetron, glucagon (rDNA), dextrose, glucose, dextrose bolus (hypoglycemia) **OR** dextrose bolus (hypoglycemia), acetaminophen **OR** acetaminophen, melatonin, sodium chloride flush, sodium chloride, sodium chloride  Allergies:   ALLERGIES:    Patient has no known allergies. SOCIAL HISTORY:     TOBACCO:   reports that she has never smoked. She has never used smokeless tobacco.     ETOH:   reports no history of alcohol use.   Patient currently lives in a nursing home      FAMILY HISTORY: Problem Relation Age of Onset    Diabetes Father    [de-identified] Arthritis Mother    [de-identified] COPD Mother     Diabetes Sister     Heart Disease Maternal Uncle     Breast Cancer Niece 36    Sleep Apnea Brother     Asthma Neg Hx     Birth Defects Neg Hx     Cancer Neg Hx     Depression Neg Hx     Early Death Neg Hx     Hearing Loss Neg Hx     High Blood Pressure Neg Hx     High Cholesterol Neg Hx     Kidney Disease Neg Hx     Learning Disabilities Neg Hx     Mental Illness Neg Hx     Mental Retardation Neg Hx     Miscarriages / Stillbirths Neg Hx     Stroke Neg Hx     Substance Abuse Neg Hx     Vision Loss Neg Hx     Other Neg Hx        REVIEW OF SYSTEMS:     Constitutional: no fever, no night sweats, + fatigue, no weight loss. Head: no head ache , no head injury, no migranes. Eye: no eye discharge, blurring of vision, no double vision,no eye pain. Ears: no hearing difficulty, no tinnitus  Mouth/throat: no ulceration, dental caries , dysphagia, no hoarseness and voice change  Respiratory: no cough no chest pain,no shortness of breath,no wheezing  CVS: no palpitation, no chest pain,   GI: no abdominal pain, no nausea , no vomiting, no constipation,no diarrhea. DARÍO: Hematuria noted on her left nephrostomy tube musculoskeletal: no joint pain, swelling , stiffness,  Endocrine: no polyuria, polydipsia, no cold or heat intolerance  Hematology: no anemia, no easy brusing or bleeding, no hx of clotting disorder  Dermatology: no skin rash, no skin lesions, no pruritis,  Neurological:no headaches,no dizziness, no seizure, no numbness. PHYSICAL EXAM:     BP (!) 161/70   Pulse 75   Temp 98.5 °F (36.9 °C) (Oral)   Resp 22   Ht 4' 9\" (1.448 m)   Wt 155 lb 6.8 oz (70.5 kg)   SpO2 99%   BMI 33.63 kg/m²   General apperance:  Awake, alert, chronically sick looking. HEENT: Pale conjunctiva, unicteric sclera, moist oral mucosa.   Chest: Bilateral air entry  Cardiovascular:  RRR ,S1S2, no murmur or F32.A    CHF (congestive heart failure) ([facility]) I50.9    Thrombocytopenia ([facility]) D69.6    Moderate episode of recurrent major depressive disorder ([facility]) F33.1    Renal failure syndrome N19    Hyperkalemia E87.5    Isolated non-nephrotic proteinuria R80.0    ALVIN (acute kidney injury) ([facility] [address] N17.9    Chronic right-sided heart failure ([facility]) I50.812    Pulmonary HTN ([facility]) I27.20    Hypotension due to hypovolemia I95.89, E86.1    Acute renal failure superimposed on stage 4 chronic kidney disease (HCC) N17.9, N18.4    Pressure injury of sacral region, stage 4 ([facility]) L89.154    Acute respiratory failure ([facility]) J96.00    Flash pulmonary edema ([facility]) J81.0    Shock ([facility]) R57.9    Aspiration pneumonia of left lung ([facility]) J69.0    Pleural effusion J90    Paroxysmal atrial fibrillation ([facility]) I48.0    Hemoptysis R04.2    Chronic respiratory failure with hypoxia ([facility]) J96.11    Pneumothorax, right J93.9    Collapse of left lung J98.11    Abnormal chest x-ray R93.89    Acute on chronic respiratory failure with hypoxia ([facility]) J96.21    Retroperitoneal hematoma K66.1    HAP (hospital-acquired pneumonia) J18.9, Y95    Colostomy in place ([facility]) Z93.3    PEG (percutaneous endoscopic gastrostomy) status ([facility]) Z93.1    Intertriginous dermatitis associated with moisture L30.4    Peristomal dermatitis associated with moisture L30.8    Nephrostomy tube displaced ([facility] [address] T83.022A    Open wound of second toe of right foot S91.104A    Open wound of second toe of left foot S91.105A           Impression and Recommendation:   Stage IV coccyx wound: We will apply Dakin's solution twice a day  Open wound with hypergranular on her left second digit: We will order x-rays to rule out osteomyelitis septic joint. We will apply silver nitrate cautery  Hematuria likely related to her nephrostomy constitutional: no fever, no night sweats, no fatigue. UTI: related to stone: recurrent. She is on iv cipro.  Will follow cx report   ANCA positive vasculites  Left nephrostomy tube malfunction    Thank you Heriberto Slater PA-C for allowing me to participate in this patient's care.     Hannah Moore MD, MD,FACP 4/15/2022 11:28 AM

## 2022-04-15 NOTE — OP NOTE
Department of Radiology  Post Procedure Progress Note      Pre-Procedure Diagnosis:  Renal Failure    Procedure Performed: Dialysis Catheter insertion     Anesthesia: local    Findings: successful    Immediate Complications:  None    Estimated Blood Loss: minimal    SEE DICTATED PROCEDURE NOTE FOR COMPLETE DETAILS.       Electronically signed by Edwin Pimentel MD on 4/15/2022 at 11:31 AM

## 2022-04-15 NOTE — H&P
Formulation and discussion of sedation / procedure plans, risks, benefits, side effects and alternatives with patient and/or responsible adult completed.     Electronically signed by Lesley Hanna MD on 4/15/2022 at 11:30 AM

## 2022-04-15 NOTE — PROGRESS NOTES
[address] [id number]  INPATIENT SPEECH THERAPY  [facility]  DAILY NOTE    TIME   SLP Individual Minutes  Time In: 6542  Time Out: 4477  Minutes: 17  Timed Code Treatment Minutes: 0 Minutes       Date: 4/15/2022  Patient Name: Gin Steven      CSN: [id number]   : 1950  (73 y.o.)  Gender: female   Referring Physician: Kia Mccarty PA-C   Diagnosis: ALVIN  Precautions: Aspiration risks/precautions, fall risk   Current Diet: Soft/bite sized diet and moderately thick liquids   Swallowing Strategies: Full Upright Position, Small Bite/Sip, No Straw, Pulmonary Monitoring, Medication in Applesauce, Alternate Solids and Liquids, Limit Distractions and Monitor for Fatigue, NO ICE   Date of Last MBS/FEES: MBS on     Pain:  No pain reported. Subjective:  Session approved by RNSerena. Reports of patient coughing with ice. Skilled level education re: inappropriateness for patient consumption of ice given presence of aspiration during MBS with thin liquid consumption. Patient seen upright in bed. Pleasant and cooperative. Green swallow guide outlined with patient (with RN also in the room) and placed on patient's care board to promote carryover by staff/patient in order to enhance swallow function and reduce risks for aspiration. **PATIENT AT HIGH ASPIRATION RISKS. INCREASED RISKS FOR AIRWAY INVASION IF SKILLED STRATEGIES ABOVE NOT COMPLETED DURING MEAL INTAKE. Short-Term Goals:  SHORT TERM GOAL #1:  Goal 1: Patient will consume a soft/bite sized diet and moderately thick liquids without overt s/s of airway invasion to meet nutritional/hydration measures safely.   INTERVENTIONS: Outline of green swallow guide with the following skilled swallowing strategies highlighted:   *Full Upright Position  *Small Bite/Sip  *No Straw  *Pulmonary Monitoring  *Medication in Applesauce  *Alternate Solids and Liquids  *Limit Distractions  *Monitor for Fatigue   *NO ICE     Consumption of moderately thick liquids with use of bolus hold maneuver to enhance oral bolus control: x2 - no overt s/s of airway invasion. SHORT TERM GOAL #2:  Goal 2: Patient will consume advanced PO solids and thin liquids with use of bolus hold maneuver with ST only without overt s/s of airway invasion to progress solids and determine appropriateness for repeat instrumental assessment. INTERVENTIONS: Advanced trials of thin liquids with bolus hold maneuver x5: wet vocal quality noted x1 with cues to cough with clearance achieved    SHORT TERM GOAL #3:  Goal 3: Patient will complete pharyngeal strengthening exercises (i.e. effortful, grisel) x10 for improved pharyngeal shortening for enhanced airway protection. INTERVENTIONS: Effortful swallows: x10 good effort  Grisel: x5 fair-good effort    HEP= x10 repetitions of above exercises, x3 times daily     SHORT TERM GOAL #4:  Goal 4: Complete repeat MBS in 1-2 weeks to further assess and determine ability to advance liquid intake pending on level of pharyngeal dysfunction still present. INTERVENTIONS: Recommendations for repeat MBS in 1 week. SHORT TERM GOAL #5:  Goal 5: Monitor cognitive functioning and complete further assessment as deemed clinically indicated. INTERVENTIONS: Orientation: poor recall with min cues to locate calendar in room. Patient able to extract information with calendar brought to patient given decreased visual acuity with good success.      Long-Term Goals:    No established LTG's given short ELOS          EDUCATION:  Learner: Patient  Education:  Reviewed results and recommendations of this evaluation, Reviewed diet and strategies, Reviewed ST goals and Plan of Care, Reviewed recommendations for follow-up and Education Related to Prevention of Recurrence of Impairment/Illness/Injury  Evaluation of Education: Verbalizes understanding, Demonstrates with assistance, Needs further instruction and Family not present    ASSESSMENT/PLAN:  Activity Tolerance:  Patient tolerance of  treatment: good. Assessment/Plan: Patient progressing toward established goals. Continues to require skilled care of licensed speech pathologist to progress toward achievement of established goals and plan of care. .     Plan for Next Session: Dysphagia rehabilitation      Roxanne Mahan M.S. CCC-SLP [id number] 4/15/2022

## 2022-04-15 NOTE — FLOWSHEET NOTE
Stable 2 hour treatment complete. No fluid removed as per order. Tolerated fluid removal well. Hemodialysis catheter ports flushed, clamped and capped. Dressing clean, dry and intact. Report given to primary RN. Treatment record printed for EMR.     04/15/22 1200 04/15/22 1430   Vital Signs   /69 (!) 146/86   Temp 98.5 °F (36.9 °C) 98.3 °F (36.8 °C)   Pulse 76 85   Resp 18 16   SpO2  --  92 %   Weight 164 lb 7.4 oz (74.6 kg) 164 lb 7.4 oz (74.6 kg)   Weight Method Bed scale Bed scale   Post-Hemodialysis Assessment   Post-Treatment Procedures  --  Blood returned;Catheter Capped, clamped with Saline x2 ports   Machine Disinfection Process  --  Acid/Vinegar Clean;Heat Disinfect; Exterior Machine Disinfection   Total Liters Processed (l/min)  --  29.6 l/min   Dialyzer Clearance  --  Lightly streaked   Duration of Treatment (minutes)  --  120 minutes   Heparin amount administered during treatment (units)  --  0 units   Hemodialysis Intake (ml)  --  400 ml   Hemodialysis Output (ml)  --  400 ml   NET Removed (ml)  --  0 ml   Tolerated Treatment  --  Good

## 2022-04-15 NOTE — CARE COORDINATION
4/15/22, 11:19 AM EDT    DISCHARGE PLANNING EVALUATION      SW provided update on pts medically POC with Caryle Keen of [place]

## 2022-04-16 LAB
ALBUMIN SERPL-MCNC: 1.9 G/DL (ref 3.5–5.1)
ALP BLD-CCNC: 71 U/L (ref 38–126)
ALT SERPL-CCNC: 14 U/L (ref 11–66)
ANION GAP SERPL CALCULATED.3IONS-SCNC: 13 MEQ/L (ref 8–16)
AST SERPL-CCNC: 12 U/L (ref 5–40)
BILIRUB SERPL-MCNC: < 0.2 MG/DL (ref 0.3–1.2)
BUN BLDV-MCNC: 75 MG/DL (ref 7–22)
CALCIUM SERPL-MCNC: 8.1 MG/DL (ref 8.5–10.5)
CHLORIDE BLD-SCNC: 100 MEQ/L (ref 98–111)
CO2: 25 MEQ/L (ref 23–33)
CREAT SERPL-MCNC: 3.4 MG/DL (ref 0.4–1.2)
GFR SERPL CREATININE-BSD FRML MDRD: 13 ML/MIN/1.73M2
GLUCOSE BLD-MCNC: 100 MG/DL (ref 70–108)
GLUCOSE BLD-MCNC: 121 MG/DL (ref 70–108)
GLUCOSE BLD-MCNC: 126 MG/DL (ref 70–108)
GLUCOSE BLD-MCNC: 134 MG/DL (ref 70–108)
GLUCOSE BLD-MCNC: 136 MG/DL (ref 70–108)
HCT VFR BLD CALC: 24.9 % (ref 37–47)
HEMOGLOBIN: 7.5 GM/DL (ref 12–16)
MAGNESIUM: 1.8 MG/DL (ref 1.6–2.4)
POTASSIUM SERPL-SCNC: 3.9 MEQ/L (ref 3.5–5.2)
SODIUM BLD-SCNC: 138 MEQ/L (ref 135–145)
TOTAL PROTEIN: 6 G/DL (ref 6.1–8)

## 2022-04-16 PROCEDURE — 2500000003 HC RX 250 WO HCPCS: Performed by: INTERNAL MEDICINE

## 2022-04-16 PROCEDURE — 83735 ASSAY OF MAGNESIUM: CPT

## 2022-04-16 PROCEDURE — 6360000002 HC RX W HCPCS: Performed by: PHYSICIAN ASSISTANT

## 2022-04-16 PROCEDURE — 90935 HEMODIALYSIS ONE EVALUATION: CPT | Performed by: INTERNAL MEDICINE

## 2022-04-16 PROCEDURE — 2500000003 HC RX 250 WO HCPCS: Performed by: PHYSICIAN ASSISTANT

## 2022-04-16 PROCEDURE — 99233 SBSQ HOSP IP/OBS HIGH 50: CPT | Performed by: PHYSICIAN ASSISTANT

## 2022-04-16 PROCEDURE — 36415 COLL VENOUS BLD VENIPUNCTURE: CPT

## 2022-04-16 PROCEDURE — 2580000003 HC RX 258: Performed by: INTERNAL MEDICINE

## 2022-04-16 PROCEDURE — 82948 REAGENT STRIP/BLOOD GLUCOSE: CPT

## 2022-04-16 PROCEDURE — 2580000003 HC RX 258: Performed by: STUDENT IN AN ORGANIZED HEALTH CARE EDUCATION/TRAINING PROGRAM

## 2022-04-16 PROCEDURE — 90935 HEMODIALYSIS ONE EVALUATION: CPT

## 2022-04-16 PROCEDURE — 80053 COMPREHEN METABOLIC PANEL: CPT

## 2022-04-16 PROCEDURE — 92526 ORAL FUNCTION THERAPY: CPT

## 2022-04-16 PROCEDURE — 1200000000 HC SEMI PRIVATE

## 2022-04-16 PROCEDURE — 6370000000 HC RX 637 (ALT 250 FOR IP): Performed by: STUDENT IN AN ORGANIZED HEALTH CARE EDUCATION/TRAINING PROGRAM

## 2022-04-16 RX ORDER — SODIUM CHLORIDE 9 MG/ML
INJECTION, SOLUTION INTRAVENOUS CONTINUOUS
Status: DISCONTINUED | OUTPATIENT
Start: 2022-04-16 | End: 2022-04-20

## 2022-04-16 RX ADMIN — FERROUS SULFATE TAB 325 MG (65 MG ELEMENTAL FE) 325 MG: 325 (65 FE) TAB at 10:17

## 2022-04-16 RX ADMIN — MICONAZOLE NITRATE: 20 POWDER TOPICAL at 19:55

## 2022-04-16 RX ADMIN — CIPROFLOXACIN 400 MG: 2 INJECTION, SOLUTION INTRAVENOUS at 11:34

## 2022-04-16 RX ADMIN — MICONAZOLE NITRATE: 20 POWDER TOPICAL at 10:18

## 2022-04-16 RX ADMIN — FLUOXETINE 40 MG: 20 CAPSULE ORAL at 10:17

## 2022-04-16 RX ADMIN — SODIUM BICARBONATE: 84 INJECTION, SOLUTION INTRAVENOUS at 04:55

## 2022-04-16 RX ADMIN — DAKIN'S SOLUTION 0.125% (QUARTER STRENGTH): 0.12 SOLUTION at 10:18

## 2022-04-16 RX ADMIN — METOPROLOL TARTRATE 12.5 MG: 25 TABLET, FILM COATED ORAL at 10:17

## 2022-04-16 RX ADMIN — SODIUM CHLORIDE, PRESERVATIVE FREE 10 ML: 5 INJECTION INTRAVENOUS at 19:57

## 2022-04-16 RX ADMIN — METOPROLOL TARTRATE 12.5 MG: 25 TABLET, FILM COATED ORAL at 19:55

## 2022-04-16 RX ADMIN — SODIUM CHLORIDE: 9 INJECTION, SOLUTION INTRAVENOUS at 10:14

## 2022-04-16 ASSESSMENT — PAIN SCALES - GENERAL
PAINLEVEL_OUTOF10: 0

## 2022-04-16 NOTE — FLOWSHEET NOTE
04/16/22 0740 04/16/22 1001   Vital Signs   BP (!) 147/71 (!) 141/71   Temp 98 °F (36.7 °C) 98 °F (36.7 °C)   Pulse 82 78   Resp 30 18   SpO2 99 % 99 %   Weight 160 lb 15 oz (73 kg) 160 lb 15 oz (73 kg)   Weight Method Bed scale Bed scale   Percent Weight Change -2.14 0   Post-Hemodialysis Assessment   Post-Treatment Procedures  --  Blood returned;Catheter Capped, clamped with Saline x2 ports   Machine Disinfection Process  --  Acid/Vinegar Clean;Heat Disinfect; Exterior Machine Disinfection   Rinseback Volume (ml)  --  400 ml   Total Liters Processed (l/min)  --  30.2 l/min   Dialyzer Clearance  --  Lightly streaked   Duration of Treatment (minutes)  --  120 minutes   Heparin amount administered during treatment (units)  --  0 units   Hemodialysis Intake (ml)  --  400 ml   Hemodialysis Output (ml)  --  400 ml   NET Removed (ml)  --  0 ml   Tolerated Treatment  --  Good   stable 2 hour treatment. 0 net uf. cath lines flushed with 0.9 ns, clamped and tegos in place. report called to primary nurse.

## 2022-04-16 NOTE — PROGRESS NOTES
Hospitalist Progress Note      Patient:  Violet Kerr    Unit/Bed:6K-18/018-A  YOB: 1950  MRN: [id number]   Acct: [de-identified]   PCP: Rubio Díaz MD  Date of Admission: 4/11/2022    Assessment/Plan:    1. ALVIN on CKD stage III, worsening: unclear etiology but some volume depletion contributing in additional to diuretic use per Nephro who has been consulted. Renal US without acute abnormalities. Aggressive IVFs. Avoid nephrotoxic agents / contrast.   4/15: temporary dialysis catheter and RRT, septic ATN contributing. 4/16: renal US without acute abnormalities, had HD today. Cr looks slightly improved. 2. Acute hypoxic respiratory failure: on 1 L NC with SpO2 96%. Encourage IS / Acapella. If continues to worsen obtain CXR. Wean O2 as tolerated. 3. Hypomagnesemia, resolved: replaced  4. Hyperkalemia, resolved: K noted at 5.3, on Lokelma. Continue with IVF. Nephrology following. Check daily BMP. 5. Dislodged L nephrostomy tube s/p nephrostogram: no indication of dislodgement on CT A/P, IR has been consulted for assessment. Previously dislodged and replaced multiple times, last being 3/14/22.   4/13: Patient to undergo nephrostogram today  4/14: appropriate placement, continue flushes, red/brownish blood in back  6. L staghorn calculus / bleeding L nephrostomy tube: Urology has been consulted. Repeat CT A/P from 4/12 shows L sided perinephric stranding and fluid collection adjacent to tube which is felt to likely be subcapsular hematoma. Continue with Q8 H&H checks. 4. Acute cystitis: chronic seaman catheter, UA shows few bacteria, moderate leukocyte esterase and 50-75 with clumps WBC. Urine culture returned growing nonfermenting gram-negative bacilli, enteric gram-negative bacilli, and gram-positive cocci. Pt denies any burning at the site of her catheter. Will discuss with Urology if treatment is indicated at this time.  Afebrile, no ED.     Patient is chronic critical ill with PEG tube for nutritional, divers colostomy bag for stage III decubitus ulcer, nephrostomy tube drain bright red blood. Patient living in nursing home, denies alcohol use, tobacco use.     Patient was admitted and managed for ALVIN secondary to dehydration and possible obstruction.      ED work-up: Afebrile with BP elevate. BMP show hypokalemia with potassium 5.4, serum osmolarity elevate 322.1. EKG showed sinus tachycardia with PVC, St & T wave abnormality. Patient is asymptomatic for chest pain. CBC showed anemia with hemoglobin 7.5 which reduced compared to her baseline at 8 on 9. \"    4/13: pt doing okay, sitting up in bed with family at bedside. Denies fever/chills/CP/SOB. Still has dark blood noted in her nephrostomy bag. Denies any burning at the site of her catheter. 4/14: pt seen having returned from [facility] today and was noted to have episode of nausea/choking / coughing and a small amount of whitish emesis (barium). Pt denied abd pain and denied any SOB at that time. 4/15: pt lying in bed, no complaints. Still with blood in her nephrostomy bag. Denies SOB/cough. No fever/chills. No CP. A&Ox4. Subjective (past 24 hours):   4/16: pt doing fine, lying in bed. Offers no new complaints. Had HD today. Still with blood in her nephrostomy bag. Hgb remains stable. Past medical history, family history, social history and allergies reviewed again and is unchanged since admission. ROS (All review of systems completed. Pertinent positives noted.  Otherwise All other systems reviewed and negative.)     Medications:  Reviewed    Infusion Medications    sodium chloride 50 mL/hr at 04/16/22 1014    dextrose      sodium chloride 200 mL (04/15/22 0771)    sodium chloride       Scheduled Medications    sodium hypochlorite   Irrigation Daily    silver nitrate applicators  1 each Topical Once    miconazole   Topical BID    ciprofloxacin  400 mg IntraVENous Q12H  [Held by provider] aspirin  81 mg Oral Daily    [Held by provider] bumetanide  0.5 mg Oral Daily    famotidine  20 mg PEG Tube Every Other Day    ferrous sulfate  325 mg Oral Every Other Day    FLUoxetine  40 mg Oral Daily    metoprolol tartrate  12.5 mg Oral BID    sodium chloride flush  5-40 mL IntraVENous 2 times per day     PRN Meds: ondansetron, glucagon (rDNA), dextrose, glucose, dextrose bolus (hypoglycemia) **OR** dextrose bolus (hypoglycemia), acetaminophen **OR** acetaminophen, melatonin, sodium chloride flush, sodium chloride, sodium chloride      Intake/Output Summary (Last 24 hours) at 4/16/2022 1334  Last data filed at 4/16/2022 1032  Gross per 24 hour   Intake 1040 ml   Output 2200 ml   Net -1160 ml       Diet:  ADULT TUBE FEEDING; PEG; Renal Formula; Cyclic; 40; 5:44 PM; 1:08 AM; 30; Other (specify); 30 mls before and after cyclic feeding  ADULT DIET; Dysphagia - Soft and Bite Sized; Moderately Thick (Honey); No Drinking Straws    Exam:  BP (!) 162/69   Pulse 68   Temp 98.6 °F (37 °C) (Oral)   Resp 20   Ht 4' 9\" (1.448 m)   Wt 160 lb 15 oz (73 kg)   SpO2 96%   BMI 34.83 kg/m²   General appearance: chronically ill appearing, no apparent distress, appears stated age and cooperative. HEENT: Pupils equal, round, and reactive to light. Conjunctivae/corneas clear. Neck: Supple, with full range of motion. No jugular venous distention. Trachea midline. Respiratory:  Normal respiratory effort. Clear to auscultation, bilaterally without Rales/Wheezes/Rhonchi. Cardiovascular: Regular rate and rhythm with normal S1/S2 without murmurs, rubs or gallops. Abdomen: Soft, non-tender, non-distended with normal bowel sounds. L side nephrostomy tube with blood in bag  Musculoskeletal: passive and active ROM x 4 extremities. Skin: Skin color, texture, turgor normal.  No rashes or lesions. Neurologic:  Neurovascularly intact without any focal sensory/motor deficits.  Cranial nerves: II-XII intact, light growth  02/22/2022    LABAERO  02/22/2022     very light growth Isolates of Methicillin Resistant Staphylococcus coagulase negative (MRSE) do NOT require CONTACT isolation. Methicillin(Oxacillin)resistant strains of staphylococci (MRSA)or(MRSE)should be considered resistant to all classes of cephalosporins, penems and beta-lactams. Lab Results   Component Value Date    LABANAE  02/22/2022     No anaerobes isolated- preliminary No anaerobes isolated        Urinalysis:      Lab Results   Component Value Date    NITRU NEGATIVE 04/12/2022    WBCUA 50-75W/CLUMPS 04/12/2022    BACTERIA FEW 04/12/2022    RBCUA > 200 04/12/2022    BLOODU LARGE 04/12/2022    SPECGRAV 1.014 01/20/2022    GLUCOSEU NEGATIVE 04/12/2022       Radiology:  XR FOOT LEFT (2 VIEWS)   Final Result   No acute finding. **This report has been created using voice recognition software. It may contain minor errors which are inherent in voice recognition technology. **      Final report electronically signed by Dr. Saima Knox on 4/15/2022 4:18 PM      IR FLUORO GUIDED NEEDLE PLACEMENT   Final Result   Status post successful nontunneled dialysis catheter insertion. **This report has been created using voice recognition software. It may contain minor errors which are inherent in voice recognition technology. **      Final report electronically signed by Dr Cristy Santa on 4/15/2022 12:06 PM      XR CHEST PORTABLE   Final Result   1. Left lower lung atelectasis/infiltrate. 2. Mild stable cardiomegaly. **This report has been created using voice recognition software. It may contain minor errors which are inherent in voice recognition technology. **      Final report electronically signed by Dr. Candelaria Barron on 4/14/2022 11:54 AM      FL MODIFIED BARIUM SWALLOW W VIDEO   Final Result   Laryngeal penetration and aspiration with thin consistency. Laryngeal penetration with nectar thick.    Recommendations available from speech therapy            **This report has been created using voice recognition software. It may contain minor errors which are inherent in voice recognition technology. **      Final report electronically signed by Dr. Jacob Allen on 4/14/2022 12:39 PM      IR GUIDED NEPHROSTOGRAM/URETEROGRAM EXISTING ACCESS   Final Result   The tip of the nephrostomy tube is coiled in the left renal collecting system. **This report has been created using voice recognition software. It may contain minor errors which are inherent in voice recognition technology. **         Final report electronically signed by Dr Lamont Vázquez on 4/14/2022 9:08 AM      CT ABDOMEN PELVIS WO CONTRAST Additional Contrast? None   Final Result   1. Small bilateral pleural effusions with adjacent atelectasis/infiltrate. 2. Left-sided perinephric stranding and fluid collection adjacent to a left-sided nephrostomy tube, likely a subcapsular hematoma. Stable calcifications in the left kidney. 3. Cholelithiasis. 4. Sacral decubitus ulcer. Final report electronically signed by Dr. Magdaleno Lebron on 4/12/2022 5:00 PM      US RENAL COMPLETE   Final Result   1. Significantly limited exam.   2. Increased renal cortical echogenicity bilaterally, compatible with    medical renal disease. 3. A left-sided nephrostomy tube is partially visualized. 4. Multiple left-sided renal calculi. 5. Mild left-sided caliectasis. The left renal pelvis is nondilated. 6. Nonspecific perinephric or subcapsular fluid is noted adjacent to the    left kidney, measuring 5.3 x 2.1 x 1.4 cm.       This document has been electronically signed by: Candice Martinez M.D. on    04/12/2022 02:30 AM        CT ABDOMEN PELVIS WO CONTRAST Additional Contrast? None    Result Date: 4/12/2022  PROCEDURE: CT ABDOMEN PELVIS WO CONTRAST CLINICAL INFORMATION: Left staghorn calculus TECHNIQUE: CT of the abdomen and pelvis was performed without use of intravenous contrast. Axial images as well as sagittal and coronal reconstructions were obtained. All CT scans at this facility use dose modulation, iterative reconstruction, and/or weight-based dosing when appropriate to reduce radiation dose to as low as reasonably achievable. COMPARISON: CT abdomen and pelvis 3/13/2022 FINDINGS: Lower thorax: There are small bilateral pleural effusions. There is adjacent lung consolidation are present. The heart is enlarged. Abdomen: Evaluation is limited due to absence of contrast. There is no free intraperitoneal air. A gastrostomy tube is in the stomach. A left mid abdominal ostomy is stable and contains loops of bowel. There is no bowel obstruction. Calcifications are present in the lumen of the gallbladder. A nephrostomy tube is now present in the left kidney. Fluid adjacent to the tube and kidney has noncontrast mean Hounsfield units of 12 (image 34). Subcapsular fluid measures up to 2.2 cm in width (image 38). Perinephric stranding is again noted. Calcifications in the left kidney are stable. Left-sided hydronephrosis is not significantly changed. Hypoattenuating lesions in the kidneys may be cysts but are incompletely characterized on the current study. There  are calcified granulomas in the liver and spleen. Atherosclerotic calcifications are present in the abdominal aorta without evidence of aneurysm. There is no mesenteric or retroperitoneal lymphadenopathy. Degenerative changes are seen in the thoracolumbar spine without evidence of aggressive osseous lesions. Pelvis: There is a Padilla catheter in a nondistended urinary bladder, likely accounting for gas in the bladder. Platelets are present in the pelvis. There are calcifications in the uterus. There is no pelvic or inguinal lymphadenopathy. There is persistent subcutaneous tissue irregularity posterior to the sacrum. Degenerative changes are present in the pelvis without evidence of aggressive osseous lesions.      1. Small bilateral pleural effusions with adjacent atelectasis/infiltrate. 2. Left-sided perinephric stranding and fluid collection adjacent to a left-sided nephrostomy tube, likely a subcapsular hematoma. Stable calcifications in the left kidney. 3. Cholelithiasis. 4. Sacral decubitus ulcer. Final report electronically signed by Dr. Kd Hayden on 4/12/2022 5:00 PM    US RENAL COMPLETE    Result Date: 4/12/2022  RENAL ULTRASOUND COMPARISON: 1/3/2022. FINDINGS: Examination is significantly limited due to patient positioning and immobility. Increased renal cortical echogenicity is noted bilaterally, compatible with medical renal disease. Multiple shadowing calculi are noted within the left kidney. For example there is a 2.7 cm stone in the left kidney on image 41. Left-sided nephrostomy tube is partially visualized. Mild caliectasis is noted in the left kidney. Left renal pelvis is nondilated. Nonspecific perinephric or subcapsular fluid is noted adjacent to the left kidney, measuring 5.3 x 2.1 x 1.4 cm on image 49. Urinary bladder contains a Padilla catheter. 1. Significantly limited exam. 2. Increased renal cortical echogenicity bilaterally, compatible with medical renal disease. 3. A left-sided nephrostomy tube is partially visualized. 4. Multiple left-sided renal calculi. 5. Mild left-sided caliectasis. The left renal pelvis is nondilated. 6. Nonspecific perinephric or subcapsular fluid is noted adjacent to the left kidney, measuring 5.3 x 2.1 x 1.4 cm.  This document has been electronically signed by: Jennifer Bowens M.D. on 04/12/2022 02:30 AM      Electronically signed by Janice Steele PA-C on 4/16/2022 at 1:34 PM

## 2022-04-16 NOTE — PROGRESS NOTES
[facility]  INPATIENT SPEECH THERAPY  [facility] RENAL TELEMETRY 6K  DAILY NOTE    TIME   SLP Individual Minutes  Time In: 2377  Time Out: [address]  Minutes: 17  Timed Code Treatment Minutes: 0 Minutes       Date: 2022  Patient Name: Mildred El      CSN: [id number]   : 1950  (73 y.o.)  Gender: female   Referring Physician: Elijah Peralta PA-C   Diagnosis: ALVIN  Precautions: Aspiration risks/precautions, fall risk   Current Diet: Soft/bite sized diet and moderately thick liquids   Swallowing Strategies: Full Upright Position, Small Bite/Sip, No Straw, Pulmonary Monitoring, Medication in Applesauce, Alternate Solids and Liquids, Limit Distractions and Monitor for Fatigue, NO ICE   Date of Last MBS/FEES: MBS on     Pain:  No pain reported. Subjective:  NOAM Neal approved completion of dysphagia tx this date. Upon arrival to room, patient awake in bed with brother, sister, niece, and great nephew and great niece present at beside. Patient agreeable to PO trials. Patient highly distracted throughout    Short-Term Goals:  SHORT TERM GOAL #1:  Goal 1: Patient will consume a soft/bite sized diet and moderately thick liquids without overt s/s of airway invasion to meet nutritional/hydration measures safely. INTERVENTIONS: ST completed review of the following swallow strategies to prevent airway invasion events and meet nutrition/hydration needs. *Full Upright Position  *Small Bite/Sip  *No Straw  *Pulmonary Monitoring  *Medication in Applesauce  *Alternate Solids and Liquids  *Limit Distractions  *Monitor for Fatigue   *NO ICE     Consumption of moderately thick liquids with use of bolus hold maneuver to enhance oral bolus control: x8 with wet/gurgly vocal quality observed x1 this date.      With hard/coarse texture (cassandra cracker), patient demonstrated evidence of adequate bolus control and manipulation, prolonged/coordinated mastication with effective textural breakdown, adequate bolus formation resulting in complete bolus clearance, suspected timely swallow initiation, and no overt s/s of aspiration. SHORT TERM GOAL #2:  Goal 2: Patient will consume advanced PO solids and thin liquids with use of bolus hold maneuver with ST only without overt s/s of airway invasion to progress solids and determine appropriateness for repeat instrumental assessment. INTERVENTIONS: Advanced trials of thin liquids with bolus hold maneuver x5: wet vocal quality noted x1 with cues to cough with clearance achieved    SHORT TERM GOAL #3:  Goal 3: Patient will complete pharyngeal strengthening exercises (i.e. effortful, joaquina) x10 for improved pharyngeal shortening for enhanced airway protection. INTERVENTIONS: Did not address this session d/t focus on other goals     Recommend continued HEP= x10 repetitions of  Effortful and joaquina, x3 times daily     SHORT TERM GOAL #4:  Goal 4: Complete repeat MBS in 1-2 weeks to further assess and determine ability to advance liquid intake pending on level of pharyngeal dysfunction still present. INTERVENTIONS: Recommendations for repeat MBS in 1 week. SHORT TERM GOAL #5:  Goal 5: Monitor cognitive functioning and complete further assessment as deemed clinically indicated. INTERVENTIONS: Did not address this date d/t focus on other goals. Long-Term Goals:    No established LTG's given short ELOS          EDUCATION:  Learner: Patient  Education:  Reviewed results and recommendations of this evaluation, Reviewed diet and strategies, Reviewed ST goals and Plan of Care, Reviewed recommendations for follow-up and Education Related to Prevention of Recurrence of Impairment/Illness/Injury  Evaluation of Education: Verbalizes understanding, Demonstrates with assistance, Needs further instruction and Family not present    ASSESSMENT/PLAN:  Activity Tolerance:  Patient tolerance of  treatment: good. Assessment/Plan: Patient progressing toward established goals. Continues to require skilled care of licensed speech pathologist to progress toward achievement of established goals and plan of care. .     Plan for Next Session: Dysphagia rehabilitation      [facility]) Melany Dorado M.A., [address]

## 2022-04-16 NOTE — PLAN OF CARE
Care plan reviewed with patient and family. Patient and family verbalize understanding of the plan of care and contribute to goal setting. Problem: Falls - Risk of:  Goal: Will remain free from falls  Description: Will remain free from falls  4/16/2022 1454 by Eirka Fierro RN  Outcome: Ongoing  Note: No falls noted this shift. Continue falling star program. Bed alarm on, bed in low position. Call light and personal belongings in reach. Patient uses call light appropriately. Goal: Absence of physical injury  Description: Absence of physical injury  4/16/2022 1454 by Erika Fierro RN  Outcome: Ongoing  Note: No falls noted this shift. Continue falling star program. Bed alarm on, bed in low position. Call light and personal belongings in reach. Patient uses call light appropriately.       Problem: Skin Integrity:  Goal: Will show no infection signs and symptoms  Description: Will show no infection signs and symptoms  4/16/2022 1454 by Erika Fierro RN  Outcome: Ongoing  Note: IV cipro and Dakins BID to wound on coccyx, ID following    Goal: Absence of new skin breakdown  Description: Absence of new skin breakdown  4/16/2022 1454 by Erika Fierro RN  Outcome: Ongoing  Note: Q2 turns, wound following, Dakins dressing BID and PRN       Problem: Confusion - Acute:  Goal: Absence of continued neurological deterioration signs and symptoms  Description: Absence of continued neurological deterioration signs and symptoms  4/16/2022 1454 by Erika Fierro RN  Outcome: Ongoing  Note: Alert and oriented x 2 - monitoring    Goal: Mental status will be restored to baseline  Description: Mental status will be restored to baseline  4/16/2022 1454 by Erika Fierro RN  Outcome: Ongoing       Problem: Discharge Planning:  Goal: Ability to perform activities of daily living will improve  Description: Ability to perform activities of daily living will improve  4/16/2022 1454 by Erika Fierro Davy Fothergill, RN  Outcome: Ongoing    Goal: Decrease in sensory misperception frequency  Description: Decrease in sensory misperception frequency  Outcome: Ongoing  Goal: Able to refrain from responding to false sensory perceptions  Description: Able to refrain from responding to false sensory perceptions  Outcome: Ongoing  Goal: Demonstrates accurate environmental perceptions  Description: Demonstrates accurate environmental perceptions  Outcome: Ongoing  Goal: Able to distinguish between reality-based and nonreality-based thinking  Description: Able to distinguish between reality-based and nonreality-based thinking  Outcome: Ongoing  Goal: Able to interrupt nonreality-based thinking  Description: Able to interrupt nonreality-based thinking  Outcome: Ongoing     Problem: Sleep Pattern Disturbance:  Goal: Appears well-rested  Description: Appears well-rested  4/16/2022 1454 by Luis Fernando Cole RN  Outcome: Ongoing  Note: Fatigued post dialysis    Problem: Nutrition  Goal: Optimal nutrition therapy  4/16/2022 1454 by Luis Fernando Cole RN  Outcome: Ongoing  Note: Speech following, dietary following, nightly tube feedings and trays during the day       Problem: DISCHARGE BARRIERS  Goal: Patient's continuum of care needs are met  4/16/2022 1454 by Luis Fernando Cole RN  Outcome: Ongoing  Note: Purposefully hourly rounding     Problem: Pain:  Description: Pain management should include both nonpharmacologic and pharmacologic interventions. Goal: Pain level will decrease  Description: Pain level will decrease  4/16/2022 1454 by Luis Fernando Cole RN  Outcome: Ongoing  Note: Patient denies pain so far this shift, will continue to monitor.    Pain goal 0/10    Goal: Control of acute pain  Description: Control of acute pain  Outcome: Ongoing  Goal: Control of chronic pain  Description: Control of chronic pain  Outcome: Ongoing

## 2022-04-16 NOTE — PROGRESS NOTES
[facility]         Renal Inpatient Follow-Up note         4/16/2022 9:13 AM    Pt Name:   Dirk Joy  YOB: 1950  Attending:   Renzo Hernandez PA-C    Chief Complaint : Dirk Joy is a 73 y.o. 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 No distress  Feels ok. No cp or SOB. Some urine output noted  Seen during dialysis tolerating procedure well     Scheduled Medications :    sodium hypochlorite   Irrigation Daily    silver nitrate applicators  1 each Topical Once    miconazole   Topical BID    ciprofloxacin  400 mg IntraVENous Q12H    [Held by provider] aspirin  81 mg Oral Daily    [Held by provider] bumetanide  0.5 mg Oral Daily    famotidine  20 mg PEG Tube Every Other Day    ferrous sulfate  325 mg Oral Every Other Day    FLUoxetine  40 mg Oral Daily    metoprolol tartrate  12.5 mg Oral BID    sodium chloride flush  5-40 mL IntraVENous 2 times per day      dextrose      sodium bicarbonate infusion 100 mL/hr at 04/16/22 0455    sodium chloride 200 mL (04/15/22 2257)    sodium chloride         Vitals :  BP (!) 147/71   Pulse 82   Temp 98 °F (36.7 °C)   Resp 30   Ht 4' 9\" (1.448 m)   Wt 160 lb 15 oz (73 kg)   SpO2 99%   BMI 34.83 kg/m²     24HR INTAKE/OUTPUT:      Intake/Output Summary (Last 24 hours) at 4/16/2022 0913  Last data filed at 4/15/2022 2136  Gross per 24 hour   Intake 400 ml   Output 1325 ml   Net -925 ml     Last 3 weights  Wt Readings from Last 3 Encounters:   04/16/22 160 lb 15 oz (73 kg)   03/29/22 134 lb (60.8 kg)   03/13/22 133 lb (60.3 kg)           Physical Exam :  General -- well developed and well nourished  HEENT-normal external appearance of both ears and nose. Mouth/throat  - oropharynx appears to be clear and moist  Eyes-conjunctiva normal no icterus or pallor. Neck-normal range of motion supple no JVD.   Lungs -- clear  Heart -- S1, S2 heard, JVD - no  Abdomen - soft, non-tender  Extremities -- edema --no significant edema noted  CNS - awake and alert  Psychiatric-mood and memory appears normal         Last 3 CBC   Recent Labs     04/14/22  0353 04/14/22  1111 04/15/22  0344 04/15/22  1628 04/15/22  2355   WBC 9.5  --  9.8  --   --    RBC 2.74*  --  2.68*  --   --    HGB 7.8*   < > 7.6* 8.3* 7.5*   HCT 26.1*   < > 25.9* 27.5* 24.9*     --  225  --   --     < > = values in this interval not displayed. Last 3 CMP  Recent Labs     04/14/22  0353 04/15/22  0344 04/16/22  0538    138 138   K 4.8 4.2 3.9    102 100   CO2 17* 19* 25   * 143* 75*   CREATININE 5.3* 5.0* 3.4*   CALCIUM 8.3* 8.3* 8.1*   LABALBU 2.2* 2.1* 1.9*   BILITOT 0.2* <0.2* <0.2*             ASSESSMENT / Plan   1. Renal -acute kidney injury on chronic kidney disease stage III exact etiology is not clear however it appears most likely some component of volume depletion and the use of diuretic. She is also probably has some septic ATN  ? Renal ultrasound scan does not show any acute abnormalities. ? Started on renal replacement therapy yesterday  ? Seen during dialysis today tolerating procedure well  ? Monitor renal function after HD today     2. Electrolytes -normal limits  3. Mild acidosis secondary to renal dysfunction-improved with dialysis discontinue the sodium bicarb drip but start her on a slow normal saline drip  4. Anemia probably due to bleeding through the nephrostomy tube. Status post blood transfusion. 5. Bleeding from the nephrostomy tube urology has been consulted status post nephrostogram appears to be in good position  6. Hx of chronic diastolic congestive heart failure appears to be well compensated hold diuretics for now  7. Essential hypertension-stable  8. Meds reviewed and discussed with patient and nursing staff      Dr. Todd Read MD, M,D.  [facility].

## 2022-04-16 NOTE — PLAN OF CARE
Problem: Falls - Risk of:  Goal: Will remain free from falls  Description: Will remain free from falls  Outcome: Ongoing  Note: No falls noted this shift. Fall risk assessment completed. Hourly rounding performed. Bed locked in the lowest position, bed alarm on. Call lights and personal items within reach. Fall sign posted. Patient ambulates to the bathroom with staff assistance. Goal: Absence of physical injury  Description: Absence of physical injury  Outcome: Ongoing  Note: There has not been any episodes of physical injury at this time in the shift. Problem: Skin Integrity:  Goal: Will show no infection signs and symptoms  Description: Will show no infection signs and symptoms  Note: No signs of infection at this time. Goal: Absence of new skin breakdown  Description: Absence of new skin breakdown  Note: Patient free from skin breakdown. Patient turns self and makes frequent positional changes. Will continue to monitor. Problem: Confusion - Acute:  Goal: Absence of continued neurological deterioration signs and symptoms  Description: Absence of continued neurological deterioration signs and symptoms  Outcome: Ongoing  Note: Pt able to identify self and location. Will continue to reorient. Goal: Mental status will be restored to baseline  Description: Mental status will be restored to baseline  Outcome: Ongoing     Problem: Discharge Planning:  Goal: Ability to perform activities of daily living will improve  Description: Ability to perform activities of daily living will improve  Outcome: Ongoing  Note: Pt   Goal: Participates in care planning  Description: Participates in care planning  Note: Pt very cooperative and involve in care. Problem: Injury - Risk of, Physical Injury:  Goal: Will remain free from falls  Description: Will remain free from falls  Outcome: Ongoing  Note: No falls noted this shift. Fall risk assessment completed. Hourly rounding performed.  Bed locked in the lowest position, bed alarm on. Call lights and personal items within reach. Fall sign posted. Patient ambulates to the bathroom with staff assistance. Goal: Absence of physical injury  Description: Absence of physical injury  Outcome: Ongoing  Note: There has not been any episodes of physical injury at this time in the shift. Problem: Mood - Altered:  Goal: Mood stable  Description: Mood stable  Outcome: Ongoing  Note: Pt mood stable at this time. Goal: Absence of abusive behavior  Description: Absence of abusive behavior  Outcome: Ongoing  Note: Pt very calm and cooperative. Goal: Verbalizations of feeling emotionally comfortable while being cared for will increase  Description: Verbalizations of feeling emotionally comfortable while being cared for will increase  Outcome: Ongoing  Note: Pt expresses gratitude to nurse for care. Problem: Psychomotor Activity - Altered:  Goal: Absence of psychomotor disturbance signs and symptoms  Description: Absence of psychomotor disturbance signs and symptoms  Outcome: Ongoing     Problem: Sensory Perception - Impaired:  Goal: Demonstrations of improved sensory functioning will increase  Description: Demonstrations of improved sensory functioning will increase  Outcome: Ongoing     Problem: Sleep Pattern Disturbance:  Goal: Appears well-rested  Description: Appears well-rested  Outcome: Ongoing  Note: Pt encouraged to turn off TV to be able to sleep, pt insist on TV being on     Problem: Nutrition  Goal: Optimal nutrition therapy  Outcome: Ongoing  Note: Pt on Nepro 1.8 feed at 40ml via PEG tube. Problem: DISCHARGE BARRIERS  Goal: Patient's continuum of care needs are met  Outcome: Ongoing     Problem: Pain:  Goal: Pain level will decrease  Description: Pain level will decrease  Outcome: Ongoing  Note: Pt denies pain at this time. Nurse will continue to monitor. Care plan reviewed with patient.   Patient verbalize understanding of the plan of care and contribute to goal setting.

## 2022-04-17 LAB
ANION GAP SERPL CALCULATED.3IONS-SCNC: 9 MEQ/L (ref 8–16)
BASOPHILS # BLD: 0.3 %
BASOPHILS ABSOLUTE: 0 THOU/MM3 (ref 0–0.1)
BUN BLDV-MCNC: 47 MG/DL (ref 7–22)
CALCIUM SERPL-MCNC: 8.1 MG/DL (ref 8.5–10.5)
CHLORIDE BLD-SCNC: 99 MEQ/L (ref 98–111)
CO2: 28 MEQ/L (ref 23–33)
CREAT SERPL-MCNC: 2.7 MG/DL (ref 0.4–1.2)
EOSINOPHIL # BLD: 5.6 %
EOSINOPHILS ABSOLUTE: 0.7 THOU/MM3 (ref 0–0.4)
ERYTHROCYTE [DISTWIDTH] IN BLOOD BY AUTOMATED COUNT: 15.8 % (ref 11.5–14.5)
ERYTHROCYTE [DISTWIDTH] IN BLOOD BY AUTOMATED COUNT: 56.3 FL (ref 35–45)
GFR SERPL CREATININE-BSD FRML MDRD: 17 ML/MIN/1.73M2
GLUCOSE BLD-MCNC: 102 MG/DL (ref 70–108)
GLUCOSE BLD-MCNC: 105 MG/DL (ref 70–108)
GLUCOSE BLD-MCNC: 112 MG/DL (ref 70–108)
GLUCOSE BLD-MCNC: 118 MG/DL (ref 70–108)
GLUCOSE BLD-MCNC: 99 MG/DL (ref 70–108)
HCT VFR BLD CALC: 25.4 % (ref 37–47)
HEMOGLOBIN: 7.3 GM/DL (ref 12–16)
HYPOCHROMIA: PRESENT
IMMATURE GRANS (ABS): 0.07 THOU/MM3 (ref 0–0.07)
IMMATURE GRANULOCYTES: 0.6 %
LYMPHOCYTES # BLD: 5.6 %
LYMPHOCYTES ABSOLUTE: 0.7 THOU/MM3 (ref 1–4.8)
MCH RBC QN AUTO: 28.3 PG (ref 26–33)
MCHC RBC AUTO-ENTMCNC: 28.7 GM/DL (ref 32.2–35.5)
MCV RBC AUTO: 98.4 FL (ref 81–99)
MONOCYTES # BLD: 9.1 %
MONOCYTES ABSOLUTE: 1.1 THOU/MM3 (ref 0.4–1.3)
NUCLEATED RED BLOOD CELLS: 0 /100 WBC
PLATELET # BLD: 175 THOU/MM3 (ref 130–400)
PMV BLD AUTO: 8.9 FL (ref 9.4–12.4)
POTASSIUM SERPL-SCNC: 4.1 MEQ/L (ref 3.5–5.2)
RBC # BLD: 2.58 MILL/MM3 (ref 4.2–5.4)
SEG NEUTROPHILS: 78.8 %
SEGMENTED NEUTROPHILS ABSOLUTE COUNT: 9.6 THOU/MM3 (ref 1.8–7.7)
SODIUM BLD-SCNC: 136 MEQ/L (ref 135–145)
WBC # BLD: 12.2 THOU/MM3 (ref 4.8–10.8)

## 2022-04-17 PROCEDURE — 2580000003 HC RX 258: Performed by: STUDENT IN AN ORGANIZED HEALTH CARE EDUCATION/TRAINING PROGRAM

## 2022-04-17 PROCEDURE — 2580000003 HC RX 258: Performed by: INTERNAL MEDICINE

## 2022-04-17 PROCEDURE — 82948 REAGENT STRIP/BLOOD GLUCOSE: CPT

## 2022-04-17 PROCEDURE — 6370000000 HC RX 637 (ALT 250 FOR IP): Performed by: STUDENT IN AN ORGANIZED HEALTH CARE EDUCATION/TRAINING PROGRAM

## 2022-04-17 PROCEDURE — 94760 N-INVAS EAR/PLS OXIMETRY 1: CPT

## 2022-04-17 PROCEDURE — 85025 COMPLETE CBC W/AUTO DIFF WBC: CPT

## 2022-04-17 PROCEDURE — 99233 SBSQ HOSP IP/OBS HIGH 50: CPT | Performed by: PHYSICIAN ASSISTANT

## 2022-04-17 PROCEDURE — 1200000000 HC SEMI PRIVATE

## 2022-04-17 PROCEDURE — 80048 BASIC METABOLIC PNL TOTAL CA: CPT

## 2022-04-17 PROCEDURE — 36415 COLL VENOUS BLD VENIPUNCTURE: CPT

## 2022-04-17 PROCEDURE — 6360000002 HC RX W HCPCS: Performed by: PHYSICIAN ASSISTANT

## 2022-04-17 PROCEDURE — 99232 SBSQ HOSP IP/OBS MODERATE 35: CPT | Performed by: INTERNAL MEDICINE

## 2022-04-17 PROCEDURE — 2700000000 HC OXYGEN THERAPY PER DAY

## 2022-04-17 RX ADMIN — METOPROLOL TARTRATE 12.5 MG: 25 TABLET, FILM COATED ORAL at 08:24

## 2022-04-17 RX ADMIN — CIPROFLOXACIN 400 MG: 2 INJECTION, SOLUTION INTRAVENOUS at 22:22

## 2022-04-17 RX ADMIN — SODIUM CHLORIDE: 9 INJECTION, SOLUTION INTRAVENOUS at 11:16

## 2022-04-17 RX ADMIN — SODIUM CHLORIDE, PRESERVATIVE FREE 10 ML: 5 INJECTION INTRAVENOUS at 08:24

## 2022-04-17 RX ADMIN — METOPROLOL TARTRATE 12.5 MG: 25 TABLET, FILM COATED ORAL at 22:08

## 2022-04-17 RX ADMIN — FAMOTIDINE 20 MG: 20 TABLET ORAL at 08:24

## 2022-04-17 RX ADMIN — CIPROFLOXACIN 400 MG: 2 INJECTION, SOLUTION INTRAVENOUS at 12:11

## 2022-04-17 RX ADMIN — CIPROFLOXACIN 400 MG: 2 INJECTION, SOLUTION INTRAVENOUS at 00:07

## 2022-04-17 RX ADMIN — FLUOXETINE 40 MG: 20 CAPSULE ORAL at 08:24

## 2022-04-17 RX ADMIN — SODIUM CHLORIDE: 9 INJECTION, SOLUTION INTRAVENOUS at 22:20

## 2022-04-17 RX ADMIN — MICONAZOLE NITRATE: 20 POWDER TOPICAL at 22:26

## 2022-04-17 RX ADMIN — DAKIN'S SOLUTION 0.125% (QUARTER STRENGTH): 0.12 SOLUTION at 15:13

## 2022-04-17 RX ADMIN — MICONAZOLE NITRATE: 20 POWDER TOPICAL at 08:27

## 2022-04-17 ASSESSMENT — PAIN SCALES - GENERAL
PAINLEVEL_OUTOF10: 0

## 2022-04-17 NOTE — PROGRESS NOTES
[place], Klebsiella pneumoniae and Enterococcus faecalis group D. Will consult ID for antibiotic recommendations/duration. 4/15: continue Cipro per ID  5. Acute on chronic normocytic anemia: Hg 7.5 on admission with baseline Hg 8.0s-9.0s. Bight red blood on nephrotomy tube. Repeated Hg 6.8. Transfuse 1 Unit RBC. Continue H& H q8hrs, transfusion if Hgb < 7  4/17: Hgb remains in 7 range  5. Mild-moderate dysphagia: seen by SLP, underwent MBS. Recommended honey thick liquids, no straws, and soft and bite sized. 6. Metabolic acidosis: on sodium bicarbonate per Nephro, continue to monitor. Will correct with HD. Resolved. 7. Elevated troponin: likely related to CKD, denies CP, EKG showed sinus tach with nonspecific ST/T wave change. 6. Chronic HFpEF without decompensation: TOBIAS 10/2021 EF 60% with trace TR. On Bumex 0.5 mg daily which is held due to ALVIN and dehydration  7. Essential HTN:slightly elevated, continue with home meds and monitor closely   8. Hx stage III decub ulcer s/p diverting colostomy 1/27/22: Wound care consulted and following  9. Anxiety: Continue home medications    Chief Complaint: Abnormal lab    Initial H and P:-    \"Kenzie Amaral is a 73 y.o., , female presented to [facility] ED for abnormal lab from nursing home. Past medical history significant with chronic diastolic heart failure, obstructive staghorn calculus of the left kidney not a candidate for stone treatment which nephrostomy tube was placed, stage III decubitus ulcer, CKD stage III, general anxiety disorder. Visit patient awake alert and oriented to time person and place. Patient denies any chest pain, chest discomfort, abdominal pain, back pain, neck pain. Patient sister on the bedside report that patient had been having bright red blood in nephrostomy tube over last 3 days.   Today patient nursing home notify abnormal lab work which patient was transferred to [facility] ED.     Patient is chronic critical ill with PEG tube for nutritional, divers colostomy bag for stage III decubitus ulcer, nephrostomy tube drain bright red blood. Patient living in nursing home, denies alcohol use, tobacco use.     Patient was admitted and managed for ALVIN secondary to dehydration and possible obstruction.      ED work-up: Afebrile with BP elevate. BMP show hypokalemia with potassium 5.4, serum osmolarity elevate 322.1. EKG showed sinus tachycardia with PVC, St & T wave abnormality. Patient is asymptomatic for chest pain. CBC showed anemia with hemoglobin 7.5 which reduced compared to her baseline at 8 on 9. \"    4/13: pt doing okay, sitting up in bed with family at bedside. Denies fever/chills/CP/SOB. Still has dark blood noted in her nephrostomy bag. Denies any burning at the site of her catheter. 4/14: pt seen having returned from [facility] today and was noted to have episode of nausea/choking / coughing and a small amount of whitish emesis (barium). Pt denied abd pain and denied any SOB at that time. 4/15: pt lying in bed, no complaints. Still with blood in her nephrostomy bag. Denies SOB/cough. No fever/chills. No CP. A&Ox4.     4/16: pt doing fine, lying in bed. Offers no new complaints. Had HD today. Still with blood in her nephrostomy bag. Hgb remains stable. Subjective (past 24 hours):   4/17: pt continues to have these choking like episodes where it appears she cannot breathe. She denies any sort of dysphagia or choking sensation, but notes that she feels nauseated, like \"dry heaving\". No fever/chills. Kidney function improving. Seems in brighter spirits today. Past medical history, family history, social history and allergies reviewed again and is unchanged since admission. ROS (All review of systems completed. Pertinent positives noted.  Otherwise All other systems reviewed and negative.)     Medications:  Reviewed    Infusion Medications    sodium chloride 50 mL/hr at 04/17/22 1116    dextrose      sodium chloride 200 mL bag  Musculoskeletal: passive and active ROM x 4 extremities. Skin: Skin color, texture, turgor normal.  No rashes or lesions. Neurologic:  Neurovascularly intact without any focal sensory/motor deficits. Cranial nerves: II-XII intact, grossly non-focal.  Psychiatric: Alert and oriented x4, thought content appropriate, normal insight  Capillary Refill: Brisk,< 3 seconds   Peripheral Pulses: +2 palpable, equal bilaterally     Labs:   Recent Labs     04/15/22  0344 04/15/22  0344 04/15/22  1628 04/15/22  2355 04/17/22  0450   WBC 9.8  --   --   --  12.2*   HGB 7.6*   < > 8.3* 7.5* 7.3*   HCT 25.9*   < > 27.5* 24.9* 25.4*     --   --   --  175    < > = values in this interval not displayed. Recent Labs     04/15/22  0344 04/16/22  0538 04/17/22  0450    138 136   K 4.2 3.9 4.1    100 99   CO2 19* 25 28   * 75* 47*   CREATININE 5.0* 3.4* 2.7*   CALCIUM 8.3* 8.1* 8.1*   PHOS 7.7*  --   --      Recent Labs     04/15/22  0344 04/16/22  0538   AST 14 12   ALT 15 14   BILITOT <0.2* <0.2*   ALKPHOS 73 71     No results for input(s): INR in the last 72 hours. No results for input(s): Kaleen Hope in the last 72 hours. Microbiology:    Blood culture #1:   Lab Results   Component Value Date    BC No growth-preliminary No growth  01/21/2022       Blood culture #2:No results found for: Jaida Wood    Organism:  Lab Results   Component Value Date    ORG Pseudomonas aeruginosa 04/12/2022    ORG Klebsiella pneumoniae 04/12/2022    ORG Enterococcus faecalis - (Group D) 04/12/2022         Lab Results   Component Value Date    LABGRAM  02/22/2022     Many segmented neutrophils observed. No epithelial cells observed. No bacteria seen.         MRSA culture only:No results found for: [facility]    Urine culture:   Lab Results   Component Value Date    LABURIN No growth-preliminary  01/20/2022    LABURIN [place] count: 1,000 CFU/mL  01/20/2022       Respiratory culture: No results found for: CULTRESP    Aerobic and Anaerobic :  Lab Results   Component Value Date    LABAERO  02/22/2022     No growth-preliminary Current antibiotic therapy ineffective in vitro for at least one of culture isolates. LABAERO light growth  02/22/2022    LABAERO  02/22/2022     very light growth Isolates of Methicillin Resistant Staphylococcus coagulase negative (MRSE) do NOT require CONTACT isolation. Methicillin(Oxacillin)resistant strains of staphylococci (MRSA)or(MRSE)should be considered resistant to all classes of cephalosporins, penems and beta-lactams. Lab Results   Component Value Date    LABANAE  02/22/2022     No anaerobes isolated- preliminary No anaerobes isolated        Urinalysis:      Lab Results   Component Value Date    NITRU NEGATIVE 04/12/2022    WBCUA 50-75W/CLUMPS 04/12/2022    BACTERIA FEW 04/12/2022    RBCUA > 200 04/12/2022    BLOODU LARGE 04/12/2022    SPECGRAV 1.014 01/20/2022    GLUCOSEU NEGATIVE 04/12/2022       Radiology:  XR FOOT LEFT (2 VIEWS)   Final Result   No acute finding. **This report has been created using voice recognition software. It may contain minor errors which are inherent in voice recognition technology. **      Final report electronically signed by Dr. Crescencio Dubose on 4/15/2022 4:18 PM      IR FLUORO GUIDED NEEDLE PLACEMENT   Final Result   Status post successful nontunneled dialysis catheter insertion. **This report has been created using voice recognition software. It may contain minor errors which are inherent in voice recognition technology. **      Final report electronically signed by Dr Juan Jose Vaughn on 4/15/2022 12:06 PM      XR CHEST PORTABLE   Final Result   1. Left lower lung atelectasis/infiltrate. 2. Mild stable cardiomegaly. **This report has been created using voice recognition software. It may contain minor errors which are inherent in voice recognition technology. **      Final report electronically signed by

## 2022-04-17 NOTE — PROGRESS NOTES
[facility]         Renal Inpatient Follow-Up note         4/17/2022 11:37 AM    Pt Name:   Mildred El  YOB: 1950  Attending:   Elijah Peralta PA-C    Chief Complaint : Mildred El is a 73 y.o. 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 No distress  Feels ok. No cp or SOB. Some urine output noted  Mental status seems to be better     Scheduled Medications :    sodium hypochlorite   Irrigation Daily    silver nitrate applicators  1 each Topical Once    miconazole   Topical BID    ciprofloxacin  400 mg IntraVENous Q12H    [Held by provider] aspirin  81 mg Oral Daily    [Held by provider] bumetanide  0.5 mg Oral Daily    famotidine  20 mg PEG Tube Every Other Day    ferrous sulfate  325 mg Oral Every Other Day    FLUoxetine  40 mg Oral Daily    metoprolol tartrate  12.5 mg Oral BID    sodium chloride flush  5-40 mL IntraVENous 2 times per day      sodium chloride 50 mL/hr at 04/17/22 1116    dextrose      sodium chloride 200 mL (04/15/22 2257)    sodium chloride         Vitals :  BP (!) 153/70   Pulse 77   Temp 98.6 °F (37 °C) (Oral)   Resp 18   Ht 4' 9\" (1.448 m)   Wt 160 lb 15 oz (73 kg)   SpO2 97%   BMI 34.83 kg/m²     24HR INTAKE/OUTPUT:      Intake/Output Summary (Last 24 hours) at 4/17/2022 1137  Last data filed at 4/17/2022 1119  Gross per 24 hour   Intake 6349.8 ml   Output 935 ml   Net 5414.8 ml     Last 3 weights  Wt Readings from Last 3 Encounters:   04/16/22 160 lb 15 oz (73 kg)   03/29/22 134 lb (60.8 kg)   03/13/22 133 lb (60.3 kg)           Physical Exam :  General -- well developed and well nourished  HEENT-normal external appearance of both ears and nose. Mouth/throat  - oropharynx appears to be clear and moist  Eyes-conjunctiva normal no icterus or pallor. Neck-normal range of motion supple no JVD.   Lungs -- clear  Heart -- S1, S2 heard, JVD - no  Abdomen - soft, non-tender  Extremities -- edema --no significant edema noted  CNS - awake and alert  Psychiatric-mood and memory appears normal         Last 3 CBC   Recent Labs     04/15/22  0344 04/15/22  0344 04/15/22  1628 04/15/22  2355 04/17/22  0450   WBC 9.8  --   --   --  12.2*   RBC 2.68*  --   --   --  2.58*   HGB 7.6*   < > 8.3* 7.5* 7.3*   HCT 25.9*   < > 27.5* 24.9* 25.4*     --   --   --  175    < > = values in this interval not displayed. Last 3 CMP  Recent Labs     04/15/22  0344 04/16/22  0538 04/17/22  0450    138 136   K 4.2 3.9 4.1    100 99   CO2 19* 25 28   * 75* 47*   CREATININE 5.0* 3.4* 2.7*   CALCIUM 8.3* 8.1* 8.1*   LABALBU 2.1* 1.9*  --    BILITOT <0.2* <0.2*  --              ASSESSMENT / Plan   1. Renal -acute kidney injury on chronic kidney disease stage III exact etiology is not clear however it appears most likely some component of volume depletion and the use of diuretic. She is also probably has some septic ATN  ? Renal ultrasound scan does not show any acute abnormalities. ? Started on renal replacement therapy, had 2 treatments so far  ? Making some urine . No acute need for dialysis today  ? .  IV fluids monitor BMP     2. Electrolytes -within normal limit  3. Mild acidosis secondary to renal dysfunction-improved with dialysis  4. Anemia probably due to bleeding through the nephrostomy tube. Status post blood transfusion. 5. Bleeding from the nephrostomy tube urology has been consulted status post nephrostogram appears to be in good position  6. Hx of chronic diastolic congestive heart failure appears to be well compensated hold diuretics for now  7. Essential hypertension-stable  8. Meds reviewed and discussed with patient and nursing staff      Dr. Malvin Schwab, MD, M,D.  [facility].

## 2022-04-18 ENCOUNTER — APPOINTMENT (OUTPATIENT)
Dept: GENERAL RADIOLOGY | Age: 72
DRG: 673 | End: 2022-04-18
Payer: MEDICARE

## 2022-04-18 LAB
ANION GAP SERPL CALCULATED.3IONS-SCNC: 14 MEQ/L (ref 8–16)
BUN BLDV-MCNC: 54 MG/DL (ref 7–22)
CALCIUM SERPL-MCNC: 8.5 MG/DL (ref 8.5–10.5)
CHLORIDE BLD-SCNC: 99 MEQ/L (ref 98–111)
CO2: 23 MEQ/L (ref 23–33)
CREAT SERPL-MCNC: 3.2 MG/DL (ref 0.4–1.2)
ERYTHROCYTE [DISTWIDTH] IN BLOOD BY AUTOMATED COUNT: 15.7 % (ref 11.5–14.5)
ERYTHROCYTE [DISTWIDTH] IN BLOOD BY AUTOMATED COUNT: 56.9 FL (ref 35–45)
FERRITIN: 600 NG/ML (ref 10–291)
GFR SERPL CREATININE-BSD FRML MDRD: 14 ML/MIN/1.73M2
GLUCOSE BLD-MCNC: 104 MG/DL (ref 70–108)
GLUCOSE BLD-MCNC: 106 MG/DL (ref 70–108)
GLUCOSE BLD-MCNC: 112 MG/DL (ref 70–108)
HCT VFR BLD CALC: 24.9 % (ref 37–47)
HEMOGLOBIN: 7.1 GM/DL (ref 12–16)
IRON SATURATION: 20 % (ref 20–50)
IRON: 26 UG/DL (ref 50–170)
MCH RBC QN AUTO: 28.5 PG (ref 26–33)
MCHC RBC AUTO-ENTMCNC: 28.5 GM/DL (ref 32.2–35.5)
MCV RBC AUTO: 100 FL (ref 81–99)
PLATELET # BLD: 188 THOU/MM3 (ref 130–400)
PMV BLD AUTO: 9.2 FL (ref 9.4–12.4)
POTASSIUM SERPL-SCNC: 4.1 MEQ/L (ref 3.5–5.2)
RBC # BLD: 2.49 MILL/MM3 (ref 4.2–5.4)
SODIUM BLD-SCNC: 136 MEQ/L (ref 135–145)
TOTAL IRON BINDING CAPACITY: 128 UG/DL (ref 171–450)
WBC # BLD: 12.3 THOU/MM3 (ref 4.8–10.8)

## 2022-04-18 PROCEDURE — 6360000002 HC RX W HCPCS: Performed by: PHARMACIST

## 2022-04-18 PROCEDURE — 82948 REAGENT STRIP/BLOOD GLUCOSE: CPT

## 2022-04-18 PROCEDURE — 80048 BASIC METABOLIC PNL TOTAL CA: CPT

## 2022-04-18 PROCEDURE — 99232 SBSQ HOSP IP/OBS MODERATE 35: CPT | Performed by: UROLOGY

## 2022-04-18 PROCEDURE — 82728 ASSAY OF FERRITIN: CPT

## 2022-04-18 PROCEDURE — 85027 COMPLETE CBC AUTOMATED: CPT

## 2022-04-18 PROCEDURE — 99233 SBSQ HOSP IP/OBS HIGH 50: CPT | Performed by: PHYSICIAN ASSISTANT

## 2022-04-18 PROCEDURE — 92526 ORAL FUNCTION THERAPY: CPT

## 2022-04-18 PROCEDURE — 71045 X-RAY EXAM CHEST 1 VIEW: CPT

## 2022-04-18 PROCEDURE — 6370000000 HC RX 637 (ALT 250 FOR IP): Performed by: PHYSICIAN ASSISTANT

## 2022-04-18 PROCEDURE — 83550 IRON BINDING TEST: CPT

## 2022-04-18 PROCEDURE — 6370000000 HC RX 637 (ALT 250 FOR IP): Performed by: STUDENT IN AN ORGANIZED HEALTH CARE EDUCATION/TRAINING PROGRAM

## 2022-04-18 PROCEDURE — 36415 COLL VENOUS BLD VENIPUNCTURE: CPT

## 2022-04-18 PROCEDURE — 2700000000 HC OXYGEN THERAPY PER DAY

## 2022-04-18 PROCEDURE — 94760 N-INVAS EAR/PLS OXIMETRY 1: CPT

## 2022-04-18 PROCEDURE — 1200000000 HC SEMI PRIVATE

## 2022-04-18 PROCEDURE — 83540 ASSAY OF IRON: CPT

## 2022-04-18 PROCEDURE — 99232 SBSQ HOSP IP/OBS MODERATE 35: CPT | Performed by: INTERNAL MEDICINE

## 2022-04-18 RX ORDER — CIPROFLOXACIN 2 MG/ML
400 INJECTION, SOLUTION INTRAVENOUS EVERY 24 HOURS
Status: DISCONTINUED | OUTPATIENT
Start: 2022-04-18 | End: 2022-04-27

## 2022-04-18 RX ORDER — GUAIFENESIN 600 MG/1
600 TABLET, EXTENDED RELEASE ORAL 2 TIMES DAILY
Status: DISCONTINUED | OUTPATIENT
Start: 2022-04-18 | End: 2022-04-28 | Stop reason: HOSPADM

## 2022-04-18 RX ORDER — IPRATROPIUM BROMIDE AND ALBUTEROL SULFATE 2.5; .5 MG/3ML; MG/3ML
1 SOLUTION RESPIRATORY (INHALATION) EVERY 4 HOURS PRN
Status: DISCONTINUED | OUTPATIENT
Start: 2022-04-18 | End: 2022-04-28 | Stop reason: HOSPADM

## 2022-04-18 RX ORDER — IPRATROPIUM BROMIDE AND ALBUTEROL SULFATE 2.5; .5 MG/3ML; MG/3ML
1 SOLUTION RESPIRATORY (INHALATION) EVERY 6 HOURS
Status: DISCONTINUED | OUTPATIENT
Start: 2022-04-18 | End: 2022-04-18

## 2022-04-18 RX ORDER — AMOXICILLIN 250 MG/1
250 CAPSULE ORAL EVERY 8 HOURS SCHEDULED
Status: DISCONTINUED | OUTPATIENT
Start: 2022-04-18 | End: 2022-04-18

## 2022-04-18 RX ORDER — AMOXICILLIN 250 MG/1
250 CAPSULE ORAL EVERY 24 HOURS
Status: DISCONTINUED | OUTPATIENT
Start: 2022-04-18 | End: 2022-04-27

## 2022-04-18 RX ADMIN — METOPROLOL TARTRATE 12.5 MG: 25 TABLET, FILM COATED ORAL at 21:38

## 2022-04-18 RX ADMIN — MICONAZOLE NITRATE: 20 POWDER TOPICAL at 21:43

## 2022-04-18 RX ADMIN — FLUOXETINE 40 MG: 20 CAPSULE ORAL at 08:35

## 2022-04-18 RX ADMIN — METOPROLOL TARTRATE 12.5 MG: 25 TABLET, FILM COATED ORAL at 08:35

## 2022-04-18 RX ADMIN — AMOXICILLIN 250 MG: 250 CAPSULE ORAL at 16:20

## 2022-04-18 RX ADMIN — MICONAZOLE NITRATE: 20 POWDER TOPICAL at 08:35

## 2022-04-18 RX ADMIN — GUAIFENESIN 600 MG: 600 TABLET, EXTENDED RELEASE ORAL at 21:38

## 2022-04-18 RX ADMIN — CIPROFLOXACIN 400 MG: 2 INJECTION, SOLUTION INTRAVENOUS at 21:40

## 2022-04-18 RX ADMIN — ACETAMINOPHEN 650 MG: 325 TABLET ORAL at 04:47

## 2022-04-18 RX ADMIN — DAKIN'S SOLUTION 0.125% (QUARTER STRENGTH): 0.12 SOLUTION at 08:36

## 2022-04-18 RX ADMIN — GUAIFENESIN 600 MG: 600 TABLET, EXTENDED RELEASE ORAL at 12:56

## 2022-04-18 RX ADMIN — FERROUS SULFATE TAB 325 MG (65 MG ELEMENTAL FE) 325 MG: 325 (65 FE) TAB at 08:35

## 2022-04-18 ASSESSMENT — PAIN DESCRIPTION - LOCATION: LOCATION: HEAD

## 2022-04-18 ASSESSMENT — PAIN DESCRIPTION - PAIN TYPE: TYPE: ACUTE PAIN

## 2022-04-18 ASSESSMENT — PAIN SCALES - GENERAL
PAINLEVEL_OUTOF10: 0
PAINLEVEL_OUTOF10: 5
PAINLEVEL_OUTOF10: 0
PAINLEVEL_OUTOF10: 5

## 2022-04-18 NOTE — PROGRESS NOTES
Progress note: Infectious diseases    Patient - Shannan Silva,  Age - 73 y.o.    - 1950      Room Number - 6K-18/018-A   MRN -  [id number]   Acct # - [de-identified]  Date of Admission -  2022  8:20 PM    SUBJECTIVE:   No  New issues. OBJECTIVE   VITALS    height is 4' 9\" (1.448 m) and weight is 160 lb 15 oz (73 kg). Her oral temperature is 97.9 °F (36.6 °C). Her blood pressure is 145/64 (abnormal) and her pulse is 61. Her respiration is 18 and oxygen saturation is 97%.        Wt Readings from Last 3 Encounters:   22 160 lb 15 oz (73 kg)   22 134 lb (60.8 kg)   22 133 lb (60.3 kg)       I/O (24 Hours)    Intake/Output Summary (Last 24 hours) at 2022 1238  Last data filed at 2022 1127  Gross per 24 hour   Intake 1407 ml   Output 520 ml   Net 887 ml       General Appearance  Awake, alert, oriented,  Chronically sick looking  HEENT - normocephalic, atraumatic, pale  conjunctiva,  anicteric sclera  Neck - Supple, no mass  Lungs -  Bilateral   air entry, no rhonchi, no wheeze  Cardiovascular - Heart sounds are normal.     Abdomen - soft, not distended, nontender,   Neurologic -oriented  Skin - No bruising or bleeding  Extremities - + edema, no cyanosis, clubbing hammer toe deformity of the second digit  Left nephrostomy tube    MEDICATIONS:      ciprofloxacin  400 mg IntraVENous Q24H    guaiFENesin  600 mg Oral BID    ipratropium-albuterol  1 ampule Inhalation Q6H    amoxicillin  250 mg Oral 3 times per day    sodium hypochlorite   Irrigation Daily    silver nitrate applicators  1 each Topical Once    miconazole   Topical BID    [Held by provider] aspirin  81 mg Oral Daily    [Held by provider] bumetanide  0.5 mg Oral Daily    famotidine  20 mg PEG Tube Every Other Day    ferrous sulfate  325 mg Oral Every Other Day    FLUoxetine  40 mg Oral Daily    metoprolol tartrate  12.5 mg Oral BID    sodium chloride flush  5-40 mL IntraVENous 2 times per day      sodium chloride 50 mL/hr at 04/17/22 2220    dextrose      sodium chloride 200 mL (04/15/22 2257)    sodium chloride       ondansetron, glucagon (rDNA), dextrose, glucose, dextrose bolus (hypoglycemia) **OR** dextrose bolus (hypoglycemia), acetaminophen **OR** acetaminophen, melatonin, sodium chloride flush, sodium chloride, sodium chloride      LABS:     CBC:   Recent Labs     04/15/22  2355 04/17/22  0450 04/18/22  1032   WBC  --  12.2* 12.3*   HGB 7.5* 7.3* 7.1*   PLT  --  175 188     BMP:    Recent Labs     04/16/22  0538 04/17/22  0450 04/18/22  0522    136 136   K 3.9 4.1 4.1    99 99   CO2 25 28 23   BUN 75* 47* 54*   CREATININE 3.4* 2.7* 3.2*   GLUCOSE 126* 102 106     Calcium:  Recent Labs     04/18/22 0522   CALCIUM 8.5     Ionized Calcium:No results for input(s): IONCA in the last 72 hours. Magnesium:  Recent Labs     04/16/22  0538   MG 1.8     Phosphorus:No results for input(s): PHOS in the last 72 hours. BNP:No results for input(s): BNP in the last 72 hours. Glucose:  Recent Labs     04/17/22  1112 04/17/22  1542 04/17/22  2021   POCGLU 112* 99 105     HgbA1C: No results for input(s): LABA1C in the last 72 hours. INR: No results for input(s): INR in the last 72 hours. Hepatic:   Recent Labs     04/16/22  0538   ALKPHOS 71   ALT 14   AST 12   PROT 6.0*   BILITOT <0.2*   LABALBU 1.9*     Amylase and Lipase:No results for input(s): LACTA, AMYLASE in the last 72 hours. Lactic Acid: No results for input(s): LACTA in the last 72 hours. Troponin: No results for input(s): CKTOTAL, CKMB, TROPONINI in the last 72 hours. BNP: No results for input(s): BNP in the last 72 hours. CULTURES:   UA: No results for input(s): SPECGRAV, PHUR, COLORU, CLARITYU, MUCUS, PROTEINU, BLOODU, RBCUA, WBCUA, BACTERIA, NITRU, GLUCOSEU, BILIRUBINUR, UROBILINOGEN, KETUA, LABCAST, LABCASTTY, AMORPHOS in the last 72 hours.     Invalid

## 2022-04-18 NOTE — PROGRESS NOTES
[facility]  INPATIENT SPEECH THERAPY  STR[facility] RENAL TELEMETRY 6K  DAILY NOTE    TIME   SLP Individual Minutes  Time In: 8559  Time Out: 1020  Minutes: 12  Timed Code Treatment Minutes: 0 Minutes       Date: 2022  Patient Name: Stefany Garay      CSN: [id number]   : 1950  (73 y.o.)  Gender: female   Referring Physician: Catrina Aquino PA-C   Diagnosis: ALVIN  Precautions: Aspiration risks/precautions, fall risk   Current Diet: Soft/bite sized diet and moderately thick liquids   Swallowing Strategies: Full Upright Position, Small Bite/Sip, No Straw, Pulmonary Monitoring, Medication in Applesauce, Alternate Solids and Liquids, Limit Distractions and Monitor for Fatigue, NO ICE   Date of Last MBS/FEES: MBS on     Pain:  No pain reported. Subjective:  NOAM Loyd approved ST dysphagia treatment session. The pt is alert, pleasant, and agreeable for dysphagia treatment. Pt's family (sister and brother are present). Short-Term Goals:  SHORT TERM GOAL #1:  Goal 1: Patient will consume a soft/bite sized diet and moderately thick liquids without overt s/s of airway invasion to meet nutritional/hydration measures safely. INTERVENTIONS: Pt finishing breathing exercises upon ST arrival. Immediate persistent coughing occurred following inspiration/experation with incentive spirometer. Pt declined PO trials, but was receptive to completing pt and family education. PRIOR SESSION:  ST completed review of the following swallow strategies to prevent airway invasion events and meet nutrition/hydration needs. *Full Upright Position  *Small Bite/Sip  *No Straw  *Pulmonary Monitoring  *Medication in Applesauce  *Alternate Solids and Liquids  *Limit Distractions  *Monitor for Fatigue   *NO ICE     Consumption of moderately thick liquids with use of bolus hold maneuver to enhance oral bolus control: x8 with wet/gurgly vocal quality observed x1 this date.      With hard/coarse texture (cassandra cracker), patient demonstrated evidence of adequate bolus control and manipulation, prolonged/coordinated mastication with effective textural breakdown, adequate bolus formation resulting in complete bolus clearance, suspected timely swallow initiation, and no overt s/s of aspiration. SHORT TERM GOAL #2:  Goal 2: Patient will consume advanced PO solids and thin liquids with use of bolus hold maneuver with ST only without overt s/s of airway invasion to progress solids and determine appropriateness for repeat instrumental assessment. INTERVENTIONS: Did not address d/t focus on other goals. PRIOR SESSION:  Advanced trials of thin liquids with bolus hold maneuver x5: wet vocal quality noted x1 with cues to cough with clearance achieved    SHORT TERM GOAL #3:  Goal 3: Patient will complete pharyngeal strengthening exercises (i.e. effortful, joaquina) x10 for improved pharyngeal shortening for enhanced airway protection. INTERVENTIONS: Upon ST arrival, the pt's sister and brother were present. Both denied being present following completion of the MBS; followed by several questions re: diet recommendations. Completed education and review of diet recommendations (soft & bite size solids, moderately thick), rationale for diet/liquids levels, ST concerns for pt's decreased PO intake and desire to eat/drink. Pt currently has a PEG tube with nocturnal tube feeds, however, ST recommended close caloric monitoring to ensure adequate nutrition and hydration. Provided re: anatomy/physiology of swallow mechanism with a visual AP picture to identify structures, and rationale for diet recommendation d/t evidence of airway invasion and laryngeal penetration documented during MBS. ST reviewed potential implications (pneumonia, recurrent/prolonged hospitalizations, medical decline) if further PO intake was desired to be consumed and/or non-compliance with recommendations/diet modifications.  Patient would also benefit from further

## 2022-04-18 NOTE — PROGRESS NOTES
Pharmacy Renal Adjustment    Freeman Leal is a 73 y.o. female. Pharmacy renally adjust the following medications per P&T approved policy: ciprofloxacin     Height:   Ht Readings from Last 1 Encounters:   04/15/22 4' 9\" (1.448 m)     Weight:  Wt Readings from Last 1 Encounters:   04/16/22 160 lb 15 oz (73 kg)     Recent Labs     04/17/22  0450 04/18/22  0522   BUN 47* 54*   CREATININE 2.7* 3.2*     CrCl cannot be calculated (Unknown ideal weight.).   Calculated CrCl: 15 ml/minute     Assessment:  ALVIN on CKD    Plan:   Decrease Cipro 400 mg IV Q12H to 400 mg IV Q24H     Rufino Gutierrez PharmD 4/18/2022 8:49 AM

## 2022-04-18 NOTE — PROGRESS NOTES
Comprehensive Nutrition Assessment    Type and Reason for Visit:  Reassess (diet, tubefeeding)    Nutrition Recommendations/Plan:   Continue Nocturnal Tube Feeding: Nepro at 40ml/ hour x 12 hours daily (6pm-6am). Flush 30ml water before and after cyclic feeding. Additional free water flush per Provider  Diet per Speech Therapy/ Doctor  ONS initiated: Magic Cup TID    Nutrition Assessment:    Pt improving from a nutritional standpoint AEB tolerating nocturnal tube feeding which provides ~63% estimated daily caloric needs to encourage oral intake however oral intake has been minimal as of yet, will initiate ONS. Remains at risk for further nutritional compromise r/t dysphagia, poor oral intake, increased nutrient needs to support wound healing, on supplemental EN prior to admit, admit with ALVIN on CKD, blood from nephrostomy tube (obstructive staghorn calculus), anemia, chronic CHF, and underlying medical condition (hx: previous lengthy hospital course: [facility] admit 10/27 - 12/1/21 for pneumonia then [facility], then back to [facility] with eventual discharged to [facility]; vent/trach-(removed), dysphagia d/t prolonged intubations, PEG 11/24/21, hx HD (none since 12/23/21), wound debridements, 1/27/22: diverting colostomy, renal calculi, left nephrostomy tubes, HTN, OA, depression, thrombocytopenia, gout attack, CHF, ECF reported Covid in 10/2021).       Malnutrition Assessment:  Malnutrition Status: At risk for malnutrition (Comment)    Context:  Chronic Illness     Findings of the 6 clinical characteristics of malnutrition:  Energy Intake:  No significant decrease in energy intake (Patient has been receiving tubefeedings since 11/24/21, meeting projected nutritional needs and tolerating at [facility] prior to admit)  Weight Loss:   (Hard to assess with CHF, hx of HD (none since 12/23/21).   Note relatively stable weight since 1/20/22, note weight down~ 18.1% in the last 11 months)     Body Fat Loss:  No significant body fat loss Muscle Mass Loss:  1 - Mild muscle mass loss Temples (temporalis)  Fluid Accumulation:  1 - Mild Extremities   Strength:  Not Performed    Estimated Daily Nutrient Needs:  Energy (kcal):  ~ 4089-6672 kcals (18-20 kcals/kg/day); Weight Used for Energy Requirements:   (67 kg)     Protein (g):  42 grams (1 gram/kg/day) or more pending renal/wound status; Weight Used for Protein Requirements:  Ideal (42 kg (adjusted IBW for low stature))        Fluid (ml/day):  1754-9710 mls (25-30 mls/kg/day), hx: CHF, previous HD (none since 12/23/21); Nutrition Related Findings:       Pt. Report/Treatments/Miscellaneous: Patient resting, guest at bedside reports patient intake is low because of fear of choking and has been working with Speech Therapy, intake 25% or less of meals per graphics and RN report, reports good acceptance of chocolate Magic Cups and so will initiate TID, Hemodialysis on 4/15 and 4/16, no acute need for dialysis today per Nephrology  GI Status: colostomy with 125ml output on 4/17, tolerating nocturnal tube feeding at 40ml/ hour per RN  Pertinent Labs: BUN 54, Creatinine 3.2, Potassium 4.1, Glucose 106  Pertinent Meds: IV cipro, iron, pepcid    Wounds:   (stage 4 sacrum, right and left second toe blisters)       Current Nutrition Therapies:    ADULT TUBE FEEDING; PEG; Renal Formula; Cyclic; 40; 7:46 PM; 6:42 AM; 30; Other (specify); 30 mls before and after cyclic feeding  ADULT DIET; Dysphagia - Soft and Bite Sized; Moderately Thick (Honey); No Drinking Straws  ADULT ORAL NUTRITION SUPPLEMENT; Breakfast, Lunch, Dinner; Frozen Oral Supplement  Current Tube Feeding (TF) Orders:  · Feeding Route: PEG (Placed 11/24/21)  · Formula: Renal Formula (Nepro)  · Schedule: Cyclic (6DT-4LH)  · Water Flushes: 30 mls before and 30 mls after cyclic feeding, additional free water flush per Provider  · Current TF & Flush Orders Provides:  At [facility]: Nepro at 40 ml/hr x 24 hours/day, 1 oz prostat bolused daily, 300 mls free water flush every 8 hours~ 1799 kcals (1699 TF, 100 prostat), 93 grams protein (78 TF, 15 prostat), 164 grams CHO (154 TF, 10 prostat), 24 grams fiber, 1890 mls volume (960 TF, 30 prostat, 900 flushes) per 24 hours  · Goal TF & Flush Orders Provides: Nepro at 40 ml/hr x 12 hours per day (6pm-6am)~ 850 kcals (~ 63% of estimated daily needs), 39 grams protein, 77 grams CHO, 12 grams fiber, 349 mls free water (409 mls with flushes) in 480 mls volume (540 mls with flushes) per 24 hours. Anthropometric Measures:  · Height: 4' 9\" (144.8 cm)  · Current Body Weight: 160 lb 15 oz (73 kg) (4/16; +2 non-pitting edema, HD)   · Admission Body Weight: 134 lb (60.8 kg) (4/11/22 no edema)    · Usual Body Weight:  (Patient \"not sure\". Per EMR: 5/10/21: 180#3. 2oz, 10/17/21: 164#, 1/20/22: 144#13.5oz, 3/13/22: 133# bedscale. Per ECF weights: 2/4/22: 144#, 2/15/22: 135.5#, 3/1/22: 133#, 4/5/22: 133#,4/10/22: 148.7#)     · Ideal Body Weight: 85 lbs;  · BMI: 34.8  · Adjusted Body Weight:  ;  (Adjusted IBW for low stature: 93# (42 kg))   · BMI Categories: Obese Class 1 (BMI 30.0-34. 9)       Nutrition Diagnosis:   · Inadequate oral intake related to inadequate protein-energy intake as evidenced by intake 0-25%,poor intake prior to admission,nutrition support - enteral nutrition      Nutrition Interventions:   Food and/or Nutrient Delivery:  Continue Current Diet,Continue Current Tube Feeding,Start Oral Nutrition Supplement  Nutrition Education/Counseling:  Education initiated (Encouraged oral intake and discussed plan for supplemental TF's. )   Coordination of Nutrition Care:  Continue to monitor while inpatient    Goals:  Patient will receive 75% or more of estimated nutrient needs via diet and EN during LOS.        Nutrition Monitoring and Evaluation:   Behavioral-Environmental Outcomes:  None Identified   Food/Nutrient Intake Outcomes:  Diet Advancement/Tolerance,Food and Nutrient Intake,Enteral Nutrition Intake/Tolerance,IVF Intake  Physical Signs/Symptoms Outcomes:  Biochemical Data,Chewing or Swallowing,GI Status,Fluid Status or Edema,Meal Time Behavior,Nutrition Focused Physical Findings,Skin,Weight     Discharge Planning:     Too soon to determine     Electronically signed by Wesley Pozo RD, LD on 4/18/22 at 12:21 PM EDT    Contact: [phone]

## 2022-04-18 NOTE — CARE COORDINATION
4/18/22, 3:12 PM EDT    DISCHARGE ON GOING EVALUATION    Geo Higgins day: 6  Location: [facility]18/018-A Reason for admit[facility] [N17.9]  Acute renal failure superimposed on chronic kidney disease, unspecified CKD stage, unspecified acute renal failure type ([facility] [N17.9, N18.9]   Barriers to Discharge: Making some urine. No HD today. Creat 3.2. Dr. Venus Wilson is holding anticoag in case tunneled cath is needed. PCP: Terie Phoenix, MD  Readmission Risk Score: 26 ( )%  Patient Goals/Plan/Treatment Preferences: Return to [place] [address].

## 2022-04-18 NOTE — PROGRESS NOTES
Hospitalist Progress Note      Patient:  Germán Wills    Unit/Bed:6K-18/018-A  YOB: 1950  MRN: [id number]   Acct: [de-identified]   PCP: Primo Hill MD  Date of Admission: 4/11/2022    Assessment/Plan:    1. ALVIN on CKD stage III, improving: unclear etiology but some volume depletion contributing in additional to diuretic use per Nephro who has been consulted. Renal US without acute abnormalities. Aggressive IVFs. Avoid nephrotoxic agents / contrast. Renal US without acute abnormalities. S/p temporary dialysis catheter and RRT. 4/18: Cr improving, continue to monitor for HD needs  2. Acute hypoxic respiratory failure: on 1 L NC with SpO2 96%. Encourage IS / Acapella. If continues to worsen obtain CXR. Wean O2 as tolerated. 3. Hypomagnesemia, resolved: replaced  4. Hyperkalemia, resolved: Nephrology following. Check daily BMP. 5. Dislodged L nephrostomy tube s/p nephrostogram: no indication of dislodgement on CT A/P, IR has been consulted for assessment. Previously dislodged and replaced multiple times, last being 3/14/22.   4/13: Patient to undergo nephrostogram today  4/14: appropriate placement, continue flushes, red/brownish blood in back  6. L staghorn calculus / bleeding L nephrostomy tube: Urology has been consulted. Repeat CT A/P from 4/12 shows L sided perinephric stranding and fluid collection adjacent to tube which is felt to likely be subcapsular hematoma. Continue with Q8 H&H checks. 4. Acute cystitis: chronic seaman catheter, UA shows few bacteria, moderate leukocyte esterase and 50-75 with clumps WBC. Urine culture returned growing nonfermenting gram-negative bacilli, enteric gram-negative bacilli, and gram-positive cocci. Pt denies any burning at the site of her catheter. Will discuss with Urology if treatment is indicated at this time. Afebrile, no leukocytosis.     4/18: urine gx returned growing Pseudomonas or [place], Klebsiella pneumoniae and Enterococcus faecalis group D. Continue Cipro and add PO amoxicillin per ID  5. Acute on chronic normocytic anemia: Hg 7.5 on admission with baseline Hg 8.0s-9.0s. Bight red blood on nephrotomy tube. Repeated Hg 6.8. Transfuse 1 Unit RBC. Continue H& H q8hrs, transfusion if Hgb < 7  4/18: Hgb remains in 7 range, check Vit B12/iron studies  5. Mild-moderate dysphagia: seen by SLP, underwent MBS. Recommended honey thick liquids, no straws, and soft and bite sized. 6. Metabolic acidosis: on sodium bicarbonate per Nephro, continue to monitor. Will correct with HD. Resolved. 7. Elevated troponin: likely related to CKD, denies CP, EKG showed sinus tach with nonspecific ST/T wave change. 6. Chronic HFpEF without decompensation: TOBIAS 10/2021 EF 60% with trace TR. On Bumex 0.5 mg daily which is held due to ALVIN and dehydration  7. Essential HTN:slightly elevated, continue with home meds and monitor closely   8. Hx stage III decub ulcer s/p diverting colostomy 1/27/22: Wound care consulted and following  9. Anxiety: Continue home medications    Chief Complaint: Abnormal lab    Initial H and P:-    \"Kenzie Pedro is a 73 y.o., , female presented to [facility] for abnormal lab from nursing home. Past medical history significant with chronic diastolic heart failure, obstructive staghorn calculus of the left kidney not a candidate for stone treatment which nephrostomy tube was placed, stage III decubitus ulcer, CKD stage III, general anxiety disorder. Visit patient awake alert and oriented to time person and place. Patient denies any chest pain, chest discomfort, abdominal pain, back pain, neck pain. Patient sister on the bedside report that patient had been having bright red blood in nephrostomy tube over last 3 days.   Today patient nursing home notify abnormal lab work which patient was transferred to [facility].     Patient is chronic critical ill with PEG tube for nutritional, divers colostomy bag for stage III decubitus ulcer, nephrostomy tube drain bright red blood. Patient living in nursing home, denies alcohol use, tobacco use.     Patient was admitted and managed for ALVIN secondary to dehydration and possible obstruction.      ED work-up: Afebrile with BP elevate. BMP show hypokalemia with potassium 5.4, serum osmolarity elevate 322.1. EKG showed sinus tachycardia with PVC, St & T wave abnormality. Patient is asymptomatic for chest pain. CBC showed anemia with hemoglobin 7.5 which reduced compared to her baseline at 8 on 9. \"    4/13: pt doing okay, sitting up in bed with family at bedside. Denies fever/chills/CP/SOB. Still has dark blood noted in her nephrostomy bag. Denies any burning at the site of her catheter. 4/14: pt seen having returned from [facility] today and was noted to have episode of nausea/choking / coughing and a small amount of whitish emesis (barium). Pt denied abd pain and denied any SOB at that time. 4/15: pt lying in bed, no complaints. Still with blood in her nephrostomy bag. Denies SOB/cough. No fever/chills. No CP. A&Ox4.     4/16: pt doing fine, lying in bed. Offers no new complaints. Had HD today. Still with blood in her nephrostomy bag. Hgb remains stable. 4/17: pt continues to have these choking like episodes where it appears she cannot breathe. She denies any sort of dysphagia or choking sensation, but notes that she feels nauseated, like \"dry heaving\". No fever/chills. Kidney function improving. Seems in brighter spirits today. Subjective (past 24 hours):   4/18: pt reports she is doing okay. No CP/SOB, working with her acapella. Still has blood in her nephrostomy bag. No fever/chills. Past medical history, family history, social history and allergies reviewed again and is unchanged since admission. ROS (All review of systems completed. Pertinent positives noted.  Otherwise All other systems reviewed and negative.)     Medications:  Reviewed    Infusion Medications    sodium chloride 50 mL/hr at 04/17/22 2220    dextrose      sodium chloride 200 mL (04/15/22 2257)    sodium chloride       Scheduled Medications    ciprofloxacin  400 mg IntraVENous Q24H    guaiFENesin  600 mg Oral BID    amoxicillin  250 mg Oral Q24H    sodium hypochlorite   Irrigation Daily    silver nitrate applicators  1 each Topical Once    miconazole   Topical BID    [Held by provider] aspirin  81 mg Oral Daily    [Held by provider] bumetanide  0.5 mg Oral Daily    famotidine  20 mg PEG Tube Every Other Day    ferrous sulfate  325 mg Oral Every Other Day    FLUoxetine  40 mg Oral Daily    metoprolol tartrate  12.5 mg Oral BID    sodium chloride flush  5-40 mL IntraVENous 2 times per day     PRN Meds: ipratropium-albuterol, ondansetron, glucagon (rDNA), dextrose, glucose, dextrose bolus (hypoglycemia) **OR** dextrose bolus (hypoglycemia), acetaminophen **OR** acetaminophen, melatonin, sodium chloride flush, sodium chloride, sodium chloride      Intake/Output Summary (Last 24 hours) at 4/18/2022 1424  Last data filed at 4/18/2022 1127  Gross per 24 hour   Intake 1407 ml   Output 520 ml   Net 887 ml       Diet:  ADULT TUBE FEEDING; PEG; Renal Formula; Cyclic; 40; 1:64 PM; 0:70 AM; 30; Other (specify); 30 mls before and after cyclic feeding  ADULT DIET; Dysphagia - Soft and Bite Sized; Moderately Thick (Honey); No Drinking Straws  ADULT ORAL NUTRITION SUPPLEMENT; Breakfast, Lunch, Dinner; Frozen Oral Supplement    Exam:  BP (!) 145/64   Pulse 61   Temp 97.9 °F (36.6 °C) (Oral)   Resp 18   Ht 4' 9\" (1.448 m)   Wt 160 lb 15 oz (73 kg)   SpO2 97%   BMI 34.83 kg/m²   General appearance: chronically ill appearing, no apparent distress, appears stated age and cooperative. HEENT: Pupils equal, round, and reactive to light. Conjunctivae/corneas clear. Neck: Supple, with full range of motion. No jugular venous distention. Trachea midline. Respiratory:  Normal respiratory effort. Clear to auscultation, bilaterally without Rales/Wheezes/Rhonchi. Cardiovascular: Regular rate and rhythm with normal S1/S2 without murmurs, rubs or gallops. Abdomen: Soft, non-tender, non-distended with normal bowel sounds. L side nephrostomy tube with blood in bag  Musculoskeletal: passive and active ROM x 4 extremities. Skin: Skin color, texture, turgor normal.  No rashes or lesions. Neurologic:  Neurovascularly intact without any focal sensory/motor deficits. Cranial nerves: II-XII intact, grossly non-focal.  Psychiatric: Alert and oriented x4, thought content appropriate, normal insight  Capillary Refill: Brisk,< 3 seconds   Peripheral Pulses: +2 palpable, equal bilaterally     Labs:   Recent Labs     04/15/22  2355 04/17/22  0450 04/18/22  1032   WBC  --  12.2* 12.3*   HGB 7.5* 7.3* 7.1*   HCT 24.9* 25.4* 24.9*   PLT  --  175 188     Recent Labs     04/16/22  0538 04/17/22  0450 04/18/22  0522    136 136   K 3.9 4.1 4.1    99 99   CO2 25 28 23   BUN 75* 47* 54*   CREATININE 3.4* 2.7* 3.2*   CALCIUM 8.1* 8.1* 8.5     Recent Labs     04/16/22  0538   AST 12   ALT 14   BILITOT <0.2*   ALKPHOS 71     No results for input(s): INR in the last 72 hours. No results for input(s): Amanda Lunsford in the last 72 hours. Microbiology:    Blood culture #1:   Lab Results   Component Value Date    BC No growth-preliminary No growth  01/21/2022       Blood culture #2:No results found for: Domenico Gandhi    Organism:  Lab Results   Component Value Date    ORG Pseudomonas aeruginosa 04/12/2022    ORG Klebsiella pneumoniae 04/12/2022    ORG Enterococcus faecalis - (Group D) 04/12/2022         Lab Results   Component Value Date    LABGRAM  02/22/2022     Many segmented neutrophils observed. No epithelial cells observed. No bacteria seen.         MRSA culture only:No results found for: [facility]    Urine culture:   Lab Results   Component Value Date    LABURIN No growth-preliminary  01/20/2022    LABURIN [place] count: 1,000 CFU/mL  01/20/2022       Respiratory culture: No results found for: CULTRESP    Aerobic and Anaerobic :  Lab Results   Component Value Date    LABAERO  02/22/2022     No growth-preliminary Current antibiotic therapy ineffective in vitro for at least one of culture isolates. LABAERO light growth  02/22/2022    LABAERO  02/22/2022     very light growth Isolates of Methicillin Resistant Staphylococcus coagulase negative (MRSE) do NOT require CONTACT isolation. Methicillin(Oxacillin)resistant strains of staphylococci (MRSA)or(MRSE)should be considered resistant to all classes of cephalosporins, penems and beta-lactams. Lab Results   Component Value Date    LABANAE  02/22/2022     No anaerobes isolated- preliminary No anaerobes isolated        Urinalysis:      Lab Results   Component Value Date    NITRU NEGATIVE 04/12/2022    WBCUA 50-75W/CLUMPS 04/12/2022    BACTERIA FEW 04/12/2022    RBCUA > 200 04/12/2022    BLOODU LARGE 04/12/2022    SPECGRAV 1.014 01/20/2022    GLUCOSEU NEGATIVE 04/12/2022       Radiology:  XR FOOT LEFT (2 VIEWS)   Final Result   No acute finding. **This report has been created using voice recognition software. It may contain minor errors which are inherent in voice recognition technology. **      Final report electronically signed by Dr. Pina Hand on 4/15/2022 4:18 PM      IR FLUORO GUIDED NEEDLE PLACEMENT   Final Result   Status post successful nontunneled dialysis catheter insertion. **This report has been created using voice recognition software. It may contain minor errors which are inherent in voice recognition technology. **      Final report electronically signed by Dr Johnna Ambriz on 4/15/2022 12:06 PM      XR CHEST PORTABLE   Final Result   1. Left lower lung atelectasis/infiltrate. 2. Mild stable cardiomegaly. **This report has been created using voice recognition software.  It may contain minor errors which are inherent in voice recognition technology. **      Final report electronically signed by Dr. Sylvain Hargrove on 4/14/2022 11:54 AM      FL MODIFIED BARIUM SWALLOW W VIDEO   Final Result   Laryngeal penetration and aspiration with thin consistency. Laryngeal penetration with nectar thick. Recommendations available from speech therapy            **This report has been created using voice recognition software. It may contain minor errors which are inherent in voice recognition technology. **      Final report electronically signed by Dr. Rodrick Rowe on 4/14/2022 12:39 PM      IR GUIDED NEPHROSTOGRAM/URETEROGRAM EXISTING ACCESS   Final Result   The tip of the nephrostomy tube is coiled in the left renal collecting system. **This report has been created using voice recognition software. It may contain minor errors which are inherent in voice recognition technology. **         Final report electronically signed by Dr June Stephen on 4/14/2022 9:08 AM      CT ABDOMEN PELVIS WO CONTRAST Additional Contrast? None   Final Result   1. Small bilateral pleural effusions with adjacent atelectasis/infiltrate. 2. Left-sided perinephric stranding and fluid collection adjacent to a left-sided nephrostomy tube, likely a subcapsular hematoma. Stable calcifications in the left kidney. 3. Cholelithiasis. 4. Sacral decubitus ulcer. Final report electronically signed by Dr. Sylvain Hargrove on 4/12/2022 5:00 PM      US RENAL COMPLETE   Final Result   1. Significantly limited exam.   2. Increased renal cortical echogenicity bilaterally, compatible with    medical renal disease. 3. A left-sided nephrostomy tube is partially visualized. 4. Multiple left-sided renal calculi. 5. Mild left-sided caliectasis. The left renal pelvis is nondilated. 6. Nonspecific perinephric or subcapsular fluid is noted adjacent to the    left kidney, measuring 5.3 x 2.1 x 1.4 cm.       This document has been electronically signed by: Kerri Reza M.D. on    04/12/2022 02:30 AM        CT ABDOMEN PELVIS WO CONTRAST Additional Contrast? None    Result Date: 4/12/2022  PROCEDURE: CT ABDOMEN PELVIS WO CONTRAST CLINICAL INFORMATION: Left staghorn calculus TECHNIQUE: CT of the abdomen and pelvis was performed without use of intravenous contrast. Axial images as well as sagittal and coronal reconstructions were obtained. All CT scans at this facility use dose modulation, iterative reconstruction, and/or weight-based dosing when appropriate to reduce radiation dose to as low as reasonably achievable. COMPARISON: CT abdomen and pelvis 3/13/2022 FINDINGS: Lower thorax: There are small bilateral pleural effusions. There is adjacent lung consolidation are present. The heart is enlarged. Abdomen: Evaluation is limited due to absence of contrast. There is no free intraperitoneal air. A gastrostomy tube is in the stomach. A left mid abdominal ostomy is stable and contains loops of bowel. There is no bowel obstruction. Calcifications are present in the lumen of the gallbladder. A nephrostomy tube is now present in the left kidney. Fluid adjacent to the tube and kidney has noncontrast mean Hounsfield units of 12 (image 34). Subcapsular fluid measures up to 2.2 cm in width (image 38). Perinephric stranding is again noted. Calcifications in the left kidney are stable. Left-sided hydronephrosis is not significantly changed. Hypoattenuating lesions in the kidneys may be cysts but are incompletely characterized on the current study. There  are calcified granulomas in the liver and spleen. Atherosclerotic calcifications are present in the abdominal aorta without evidence of aneurysm. There is no mesenteric or retroperitoneal lymphadenopathy. Degenerative changes are seen in the thoracolumbar spine without evidence of aggressive osseous lesions. Pelvis: There is a Padilla catheter in a nondistended urinary bladder, likely accounting for gas in the bladder.  Platelets are present in the pelvis. There are calcifications in the uterus. There is no pelvic or inguinal lymphadenopathy. There is persistent subcutaneous tissue irregularity posterior to the sacrum. Degenerative changes are present in the pelvis without evidence of aggressive osseous lesions. 1. Small bilateral pleural effusions with adjacent atelectasis/infiltrate. 2. Left-sided perinephric stranding and fluid collection adjacent to a left-sided nephrostomy tube, likely a subcapsular hematoma. Stable calcifications in the left kidney. 3. Cholelithiasis. 4. Sacral decubitus ulcer. Final report electronically signed by Dr. Sylvain Hargrove on 4/12/2022 5:00 PM    US RENAL COMPLETE    Result Date: 4/12/2022  RENAL ULTRASOUND COMPARISON: 1/3/2022. FINDINGS: Examination is significantly limited due to patient positioning and immobility. Increased renal cortical echogenicity is noted bilaterally, compatible with medical renal disease. Multiple shadowing calculi are noted within the left kidney. For example there is a 2.7 cm stone in the left kidney on image 41. Left-sided nephrostomy tube is partially visualized. Mild caliectasis is noted in the left kidney. Left renal pelvis is nondilated. Nonspecific perinephric or subcapsular fluid is noted adjacent to the left kidney, measuring 5.3 x 2.1 x 1.4 cm on image 49. Urinary bladder contains a Padilla catheter. 1. Significantly limited exam. 2. Increased renal cortical echogenicity bilaterally, compatible with medical renal disease. 3. A left-sided nephrostomy tube is partially visualized. 4. Multiple left-sided renal calculi. 5. Mild left-sided caliectasis. The left renal pelvis is nondilated. 6. Nonspecific perinephric or subcapsular fluid is noted adjacent to the left kidney, measuring 5.3 x 2.1 x 1.4 cm.  This document has been electronically signed by: Kerri Reza M.D. on 04/12/2022 02:30 AM      Electronically signed by Catrina Aquino PA-C on 4/18/2022 at 2:24 PM

## 2022-04-18 NOTE — PROGRESS NOTES
Urology Progress Note    Chief Complaint: Abnormal lab, blood in nephrostomy tube  Reason for consultation:  Gross hematuria from L nephrostomy tube. Subjective:     Pt resting in bed. Denies pain. Nephrostomy tube with brown/red and some yellow in tubing urine with dark red/brownish urine in bag. Flushed per shift per nurse. Clear hickman urine in seaman tubing, pink tinged in bag            Vitals:  BP (!) 158/71   Pulse 69   Temp 97.9 °F (36.6 °C) (Oral)   Resp 18   Ht 4' 9\" (1.448 m)   Wt 160 lb 15 oz (73 kg)   SpO2 98%   BMI 34.83 kg/m²   Temp  Av.5 °F (36.9 °C)  Min: 97.9 °F (36.6 °C)  Max: 98.9 °F (37.2 °C)    Intake/Output Summary (Last 24 hours) at 2022 0915  Last data filed at 2022 1964  Gross per 24 hour   Intake 1447 ml   Output 695 ml   Net 752 ml       Social History     Socioeconomic History    Marital status: Single     Spouse name: Not on file    Number of children: 0    Years of education: Not on file    Highest education level: Not on file   Occupational History    Not on file   Tobacco Use    Smoking status: Never Smoker    Smokeless tobacco: Never Used   Vaping Use    Vaping Use: Never used   Substance and Sexual Activity    Alcohol use: No    Drug use: No    Sexual activity: Not Currently   Other Topics Concern    Not on file   Social History Narrative    Not on file     Social Determinants of Health     Financial Resource Strain: Low Risk     Difficulty of Paying Living Expenses: Not very hard   Food Insecurity: No Food Insecurity    Worried About Running Out of Food in the Last Year: Never true    Traci of Food in the Last Year: Never true   Transportation Needs:     Lack of Transportation (Medical): Not on file    Lack of Transportation (Non-Medical):  Not on file   Physical Activity:     Days of Exercise per Week: Not on file    Minutes of Exercise per Session: Not on file   Stress:     Feeling of Stress : Not on file   Social Connections:  Frequency of Communication with Friends and Family: Not on file    Frequency of Social Gatherings with Friends and Family: Not on file    Attends Bahai Services: Not on file    Active Member of Clubs or Organizations: Not on file    Attends Club or Organization Meetings: Not on file    Marital Status: Not on file   Intimate Partner Violence:     Fear of Current or Ex-Partner: Not on file    Emotionally Abused: Not on file    Physically Abused: Not on file    Sexually Abused: Not on file   Housing Stability:     Unable to Pay for Housing in the Last Year: Not on file    Number of Places Lived in the Last Year: Not on file    Unstable Housing in the Last Year: Not on file     Family History   Problem Relation Age of Onset    Diabetes Father     Arthritis Mother     COPD Mother     Diabetes Sister     Heart Disease Maternal Uncle     Breast Cancer Niece [address]    Sleep Apnea Brother     Asthma Neg Hx     Birth Defects Neg Hx     Cancer Neg Hx     Depression Neg Hx     Early Death Neg Hx     Hearing Loss Neg Hx     High Blood Pressure Neg Hx     High Cholesterol Neg Hx     Kidney Disease Neg Hx     Learning Disabilities Neg Hx     Mental Illness Neg Hx     Mental Retardation Neg Hx     Miscarriages / Stillbirths Neg Hx     Stroke Neg Hx     Substance Abuse Neg Hx     Vision Loss Neg Hx     Other Neg Hx      No Known Allergies      Constitutional: Pt sleepy. Arouses to name. HEENT:   Head:               Normocephalic and atraumatic. Mucous membranes are normal.   Eyes:               EOM are normal. No scleral icterus. Nose:               The external appearance of the nose is normal  Ears: The ears appear normal to external inspection. Cardiovascular:       Normal rate, regular rhythm. Pulmonary/Chest:  Normal respiratory rate and rhthym. No use of accessory muscles. Lungs clear bilaterally with diminished bases.    Abdominal:               Soft, nontender, hypoactive. Genitalia:               Padilla catheter draining yellow urine in tubing, pink tinged in bag. L nephrostomy tube with dark red/brownish urine in bag 45UOP overnight  Musculoskeletal:               Normal range of motion. Exhibits no edema or tenderness of lower extremities. Extremities:               No cyanosis, clubbing, or edema present. Neurological:               Sleepy, slow to respond  Labs:  WBC:    Lab Results   Component Value Date    WBC 12.2 04/17/2022     Hemoglobin/Hematocrit:    Lab Results   Component Value Date    HGB 7.3 04/17/2022    HCT 25.4 04/17/2022     BMP:    Lab Results   Component Value Date     04/18/2022    K 4.1 04/18/2022    K 4.1 03/14/2022    CL 99 04/18/2022    CO2 23 04/18/2022    BUN 54 04/18/2022    LABALBU 1.9 04/16/2022    CREATININE 3.2 04/18/2022    CALCIUM 8.5 04/18/2022    LABGLOM 14 04/18/2022       Narrative   PROCEDURE: CT ABDOMEN PELVIS WO CONTRAST       CLINICAL INFORMATION: Left staghorn calculus       TECHNIQUE: CT of the abdomen and pelvis was performed without use of intravenous contrast. Axial images as well as sagittal and coronal reconstructions were obtained.       All CT scans at this facility use dose modulation, iterative reconstruction, and/or weight-based dosing when appropriate to reduce radiation dose to as low as reasonably achievable.       COMPARISON: CT abdomen and pelvis 3/13/2022       FINDINGS:        Lower thorax: There are small bilateral pleural effusions. There is adjacent lung consolidation are present. The heart is enlarged.       Abdomen: Evaluation is limited due to absence of contrast. There is no free intraperitoneal air. A gastrostomy tube is in the stomach. A left mid abdominal ostomy is stable and contains loops of bowel. There is no bowel obstruction. Calcifications are    present in the lumen of the gallbladder. A nephrostomy tube is now present in the left kidney.  Fluid adjacent to the tube and kidney has noncontrast mean Hounsfield units of 12 (image 34). Subcapsular fluid measures up to 2.2 cm in width (image 38). Perinephric stranding is again noted. Calcifications in the left kidney are stable. Left-sided hydronephrosis is not significantly changed. Hypoattenuating lesions in the kidneys may be cysts but are incompletely characterized on the current study. There    are calcified granulomas in the liver and spleen. Atherosclerotic calcifications are present in the abdominal aorta without evidence of aneurysm. There is no mesenteric or retroperitoneal lymphadenopathy. Degenerative changes are seen in the    thoracolumbar spine without evidence of aggressive osseous lesions.       Pelvis: There is a Padilla catheter in a nondistended urinary bladder, likely accounting for gas in the bladder. Platelets are present in the pelvis. There are calcifications in the uterus. There is no pelvic or inguinal lymphadenopathy. There is    persistent subcutaneous tissue irregularity posterior to the sacrum. Degenerative changes are present in the pelvis without evidence of aggressive osseous lesions.           Impression   1. Small bilateral pleural effusions with adjacent atelectasis/infiltrate. 2. Left-sided perinephric stranding and fluid collection adjacent to a left-sided nephrostomy tube, likely a subcapsular hematoma. Stable calcifications in the left kidney. 3. Cholelithiasis. 4. Sacral decubitus ulcer. radiation information found for this patient  Narrative   PROCEDURE: IR GUIDED NEPHROSTOGRAM/URETEROGRAM EXISTING ACCESS       PERFORMED BY:  Dr. Monroe Rodas M.D.       CLINICAL INFORMATION: Bright red blood on nephrotomy tube x 3 days . Hg drop and received 1 unit RBC.  Hx Nephrolomy tube place 2 weeks ago       Nephrostomy tube: 8 Nepalese nephrostomy tube left side         FLUOROSCOPY TIME: 1 minute 25 seconds   FLUOROSCOPIC IMAGES: 2       TECHNIQUE: The patient came to the Department with a nephrostomy tube in place. Iodinated contrast material was injected and a nephrostogram was performed.       FINDINGS: There is free flow of contrast material through the tube, into the intrarenal collecting .           Impression   The tip of the nephrostomy tube is coiled in the left renal collecting system. Impression/plan:  Continue to flush left neph every shift    1. Left Staghorn Calculus/Bleeding from Left Nephrostomy Tube- Renal US demonstrates a subcapsular fluid collection adjacent to the left kidney. CT with nephrostomy tube in kidney. IR has been consulted to see if they can assess left nephrostomy tube position and change if needed. If nephrostomy tube needs to be changed, a nephroureteral catheter would be recommended to try to prevent any further dislodging with positional changes. UTI on Cipro - ID on board. Low UOP from nephrostomy bag, reddish/brown in colore. Pink tinged urine in seaman. Nephrostogram 4/13/22 with tube in appropriate position. Left PCNL has not been scheduled due to patient's continuing medical problems.     2. 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 CT a/p without contrast pending. Likely multifactorial. May be a component of volume depletion and diuretics. Hydration. Nephrology started on RRT, holding dialysis     3. Hyperkalemia- On Lokelma.      4. Acute Blood Loss Anemia- Received blood 4/12/22. HGB 7.3 yesterday     5.  Chronic Diastolic Heart Failure    Will follow    LANE Fry - CNP  04/18/22 9:15 AM  Urology

## 2022-04-18 NOTE — PLAN OF CARE
Nutrition Problem #1: Inadequate oral intake  Intervention: Food and/or Nutrient Delivery: Continue Current Diet,Continue Current Tube Feeding,Start Oral Nutrition Supplement  Nutritional Goals: Patient will receive 75% or more of estimated nutrient needs via diet and EN during LOS.    Problem: Nutrition  Goal: Optimal nutrition therapy  Outcome: Ongoing

## 2022-04-18 NOTE — PROGRESS NOTES
[facility]         Renal Inpatient Follow-Up note         4/18/2022 12:13 PM    Pt Name:   Gema Powell:   1950  Attending:   Katlin Aleman PA-C    Chief Complaint : Bindu Tovar is a 73 y.o. female being followed by nephrology for ALVIN    Interval History :   Patient seen and examined at bedside. No acute distress or complaints. No overnight events. Feels good. No CP or SOB. Some urine output noted in seaman, pink-tinged. Mental status is good, alert & oriented. Scheduled Medications :    ciprofloxacin  400 mg IntraVENous Q24H    guaiFENesin  600 mg Oral BID    ipratropium-albuterol  1 ampule Inhalation Q6H    sodium hypochlorite   Irrigation Daily    silver nitrate applicators  1 each Topical Once    miconazole   Topical BID    [Held by provider] aspirin  81 mg Oral Daily    [Held by provider] bumetanide  0.5 mg Oral Daily    famotidine  20 mg PEG Tube Every Other Day    ferrous sulfate  325 mg Oral Every Other Day    FLUoxetine  40 mg Oral Daily    metoprolol tartrate  12.5 mg Oral BID    sodium chloride flush  5-40 mL IntraVENous 2 times per day      sodium chloride 50 mL/hr at 04/17/22 2220    dextrose      sodium chloride 200 mL (04/15/22 2257)    sodium chloride         Vitals :  BP (!) 145/64   Pulse 61   Temp 97.9 °F (36.6 °C) (Oral)   Resp 18   Ht 4' 9\" (1.448 m)   Wt 160 lb 15 oz (73 kg)   SpO2 97%   BMI 34.83 kg/m²     24HR INTAKE/OUTPUT:      Intake/Output Summary (Last 24 hours) at 4/18/2022 1213  Last data filed at 4/18/2022 1127  Gross per 24 hour   Intake 1407 ml   Output 520 ml   Net 887 ml     Last 3 weights  Wt Readings from Last 3 Encounters:   04/16/22 160 lb 15 oz (73 kg)   03/29/22 134 lb (60.8 kg)   03/13/22 133 lb (60.3 kg)           Physical Exam :  General -- well developed and well nourished, chronically ill appearing. HEENT-normal external appearance of both ears and nose.   Mouth/throat  - oropharynx appears to be clear and moist  Eyes-conjunctiva normal no icterus or pallor. Neck-normal range of motion supple no JVD. Lungs -- clear  Heart -- S1, S2 heard, JVD - no  Abdomen - soft, non-tender  Extremities -- edema --no significant edema noted  CNS - awake and alert  Psychiatric-mood and memory appears normal         Last 3 CBC   Recent Labs     04/15/22  2355 04/17/22  0450 04/18/22  1032   WBC  --  12.2* 12.3*   RBC  --  2.58* 2.49*   HGB 7.5* 7.3* 7.1*   HCT 24.9* 25.4* 24.9*   PLT  --  175 188     Last 3 CMP  Recent Labs     04/16/22  0538 04/17/22  0450 04/18/22  0522    136 136   K 3.9 4.1 4.1    99 99   CO2 25 28 23   BUN 75* 47* 54*   CREATININE 3.4* 2.7* 3.2*   CALCIUM 8.1* 8.1* 8.5   LABALBU 1.9*  --   --    BILITOT <0.2*  --   --              ASSESSMENT / Plan   1. Renal -acute kidney injury on chronic kidney disease stage III exact etiology is not clear however it appears most likely some component of volume depletion and the use of diuretic. She is also probably has some septic ATN  ? Renal ultrasound scan does not show any acute abnormalities. ? Started on renal replacement therapy, had 2 treatments so far  ? Making some urine. No acute need for dialysis today, will monitor and decide when she needs further sessions. ? Continue IV fluids  ? Monitor BMP  2. Electrolytes -within normal limit  3. Mild acidosis secondary to renal dysfunction-improved with dialysis  4. Anemia probably due to bleeding through the nephrostomy tube. Status post blood transfusion. 5. Bleeding from the nephrostomy tube urology has been consulted status post nephrostogram appears to be in good position  6. Hx of chronic diastolic congestive heart failure appears to be well compensated hold diuretics for now  7. Essential hypertension-stable  8. Meds reviewed and discussed with patient and nursing staff      Dr. Kady Polanco, DO   Case discussed with Dr. Yoko Dill [facility].

## 2022-04-19 LAB
ANION GAP SERPL CALCULATED.3IONS-SCNC: 12 MEQ/L (ref 8–16)
BUN BLDV-MCNC: 62 MG/DL (ref 7–22)
CALCIUM SERPL-MCNC: 8.2 MG/DL (ref 8.5–10.5)
CHLORIDE BLD-SCNC: 101 MEQ/L (ref 98–111)
CO2: 23 MEQ/L (ref 23–33)
CREAT SERPL-MCNC: 3.7 MG/DL (ref 0.4–1.2)
ERYTHROCYTE [DISTWIDTH] IN BLOOD BY AUTOMATED COUNT: 15.5 % (ref 11.5–14.5)
ERYTHROCYTE [DISTWIDTH] IN BLOOD BY AUTOMATED COUNT: 55.8 FL (ref 35–45)
FOLATE: > 20 NG/ML (ref 4.8–24.2)
GFR SERPL CREATININE-BSD FRML MDRD: 12 ML/MIN/1.73M2
GLUCOSE BLD-MCNC: 103 MG/DL (ref 70–108)
GLUCOSE BLD-MCNC: 104 MG/DL (ref 70–108)
GLUCOSE BLD-MCNC: 104 MG/DL (ref 70–108)
GLUCOSE BLD-MCNC: 96 MG/DL (ref 70–108)
GLUCOSE BLD-MCNC: 96 MG/DL (ref 70–108)
HCT VFR BLD CALC: 25.1 % (ref 37–47)
HEMOGLOBIN: 7.2 GM/DL (ref 12–16)
MCH RBC QN AUTO: 28.6 PG (ref 26–33)
MCHC RBC AUTO-ENTMCNC: 28.7 GM/DL (ref 32.2–35.5)
MCV RBC AUTO: 99.6 FL (ref 81–99)
PLATELET # BLD: 164 THOU/MM3 (ref 130–400)
PMV BLD AUTO: 9 FL (ref 9.4–12.4)
POTASSIUM SERPL-SCNC: 4.1 MEQ/L (ref 3.5–5.2)
RBC # BLD: 2.52 MILL/MM3 (ref 4.2–5.4)
SCAN OF BLOOD SMEAR: NORMAL
SODIUM BLD-SCNC: 136 MEQ/L (ref 135–145)
VITAMIN B-12: 1052 PG/ML (ref 211–911)
WBC # BLD: 13.1 THOU/MM3 (ref 4.8–10.8)

## 2022-04-19 PROCEDURE — 82746 ASSAY OF FOLIC ACID SERUM: CPT

## 2022-04-19 PROCEDURE — 80048 BASIC METABOLIC PNL TOTAL CA: CPT

## 2022-04-19 PROCEDURE — 1200000000 HC SEMI PRIVATE

## 2022-04-19 PROCEDURE — 82607 VITAMIN B-12: CPT

## 2022-04-19 PROCEDURE — 36415 COLL VENOUS BLD VENIPUNCTURE: CPT

## 2022-04-19 PROCEDURE — 99233 SBSQ HOSP IP/OBS HIGH 50: CPT | Performed by: PHYSICIAN ASSISTANT

## 2022-04-19 PROCEDURE — 85027 COMPLETE CBC AUTOMATED: CPT

## 2022-04-19 PROCEDURE — 82948 REAGENT STRIP/BLOOD GLUCOSE: CPT

## 2022-04-19 PROCEDURE — 6370000000 HC RX 637 (ALT 250 FOR IP): Performed by: STUDENT IN AN ORGANIZED HEALTH CARE EDUCATION/TRAINING PROGRAM

## 2022-04-19 PROCEDURE — 6370000000 HC RX 637 (ALT 250 FOR IP): Performed by: PHYSICIAN ASSISTANT

## 2022-04-19 PROCEDURE — 99232 SBSQ HOSP IP/OBS MODERATE 35: CPT | Performed by: INTERNAL MEDICINE

## 2022-04-19 PROCEDURE — 92526 ORAL FUNCTION THERAPY: CPT

## 2022-04-19 PROCEDURE — 6360000002 HC RX W HCPCS: Performed by: PHARMACIST

## 2022-04-19 RX ADMIN — GUAIFENESIN 600 MG: 600 TABLET, EXTENDED RELEASE ORAL at 09:06

## 2022-04-19 RX ADMIN — GUAIFENESIN 600 MG: 600 TABLET, EXTENDED RELEASE ORAL at 20:06

## 2022-04-19 RX ADMIN — METOPROLOL TARTRATE 12.5 MG: 25 TABLET, FILM COATED ORAL at 09:06

## 2022-04-19 RX ADMIN — MICONAZOLE NITRATE: 20 POWDER TOPICAL at 21:53

## 2022-04-19 RX ADMIN — METOPROLOL TARTRATE 25 MG: 25 TABLET, FILM COATED ORAL at 20:06

## 2022-04-19 RX ADMIN — FAMOTIDINE 20 MG: 20 TABLET ORAL at 09:06

## 2022-04-19 RX ADMIN — MICONAZOLE NITRATE: 20 POWDER TOPICAL at 09:06

## 2022-04-19 RX ADMIN — AMOXICILLIN 250 MG: 250 CAPSULE ORAL at 14:01

## 2022-04-19 RX ADMIN — DAKIN'S SOLUTION 0.125% (QUARTER STRENGTH): 0.12 SOLUTION at 09:06

## 2022-04-19 RX ADMIN — CIPROFLOXACIN 400 MG: 2 INJECTION, SOLUTION INTRAVENOUS at 23:01

## 2022-04-19 RX ADMIN — FLUOXETINE 40 MG: 20 CAPSULE ORAL at 09:06

## 2022-04-19 ASSESSMENT — PAIN SCALES - GENERAL
PAINLEVEL_OUTOF10: 0
PAINLEVEL_OUTOF10: 0

## 2022-04-19 NOTE — PROGRESS NOTES
Hospitalist Progress Note      Patient:  Violet Kerr    Unit/Bed:6K-18/018-A  YOB: 1950  MRN: [id number]   Acct: [de-identified]   PCP: Rubio Díaz MD  Date of Admission: 4/11/2022    Assessment/Plan:    1. ALVIN on CKD stage III, improving: unclear etiology but some volume depletion contributing in additional to diuretic use per Nephro who has been consulted. Renal US without acute abnormalities. Aggressive IVFs. Avoid nephrotoxic agents / contrast. Renal US without acute abnormalities. S/p temporary dialysis catheter and RRT. 4/19: Nephro plans potential tunneled catheter tomorrow, no anticoagulation. Continue with IVF for now. Monitor labs in am for final decision, appreciate Nephro. 2. Acute hypoxic respiratory failure: on 1 L NC with SpO2 96%. Encourage IS / Acapella. CXR shows LLL infiltrate vs atelectasis. Suspect atelectasis with pt's presentation. Wean O2 as tolerated. 3. Hypomagnesemia, resolved: replaced  4. Hyperkalemia, resolved: Nephrology following. Check daily BMP. 5. Dislodged L nephrostomy tube s/p nephrostogram, resolved: no indication of dislodgement on CT A/P, IR has been consulted for assessment. Previously dislodged and replaced multiple times, last being 3/14/22.   4/13: Patient to undergo nephrostogram today  4/14: appropriate placement, continue flushes, red/brownish blood in bag  6. L staghorn calculus / bleeding L nephrostomy tube: Urology has been consulted. Repeat CT A/P from 4/12 shows L sided perinephric stranding and fluid collection adjacent to tube which is felt to likely be subcapsular hematoma. Continue with Q8 H&H checks. 4. Acute cystitis: chronic seaman catheter, UA shows few bacteria, moderate leukocyte esterase and 50-75 with clumps WBC. Urine culture returned growing nonfermenting gram-negative bacilli, enteric gram-negative bacilli, and gram-positive cocci.  Pt denies any burning at the site of her catheter. Will discuss with Urology if treatment is indicated at this time. Afebrile, no leukocytosis. 4/18: urine gx returned growing Pseudomonas or [place], Klebsiella pneumoniae and Enterococcus faecalis group D. Continue Cipro and add PO amoxicillin per ID (complete 7 days)  5. Acute on chronic normocytic anemia: Hg 7.5 on admission with baseline Hg 8.0s-9.0s. Bight red blood on nephrotomy tube. Repeated Hg 6.8. Transfuse 1 Unit RBC. Continue H& H q8hrs, transfusion if Hgb < 7  4/18: Hgb remains in 7 range, check Vit B12/iron studies  5. Mild-moderate dysphagia: seen by SLP, underwent MBS. Recommended honey thick liquids, no straws, and soft and bite sized. 6. Metabolic acidosis: on sodium bicarbonate per Nephro, continue to monitor. Will correct with HD. Resolved. 7. Elevated troponin: likely related to CKD, denies CP, EKG showed sinus tach with nonspecific ST/T wave change. 6. Chronic HFpEF without decompensation: TOBIAS 10/2021 EF 60% with trace TR. On Bumex 0.5 mg daily which is held due to ALVIN and dehydration  7. Essential HTN:slightly elevated, continue with home meds and monitor closely   8. Hx stage III decub ulcer s/p diverting colostomy 1/27/22: Wound care consulted and following  9. Anxiety: Continue home medications    Chief Complaint: Abnormal lab    Initial H and P:-    \"Kenzie Penn is a 73 y.o., , female presented to [facility] ED for abnormal lab from nursing home. Past medical history significant with chronic diastolic heart failure, obstructive staghorn calculus of the left kidney not a candidate for stone treatment which nephrostomy tube was placed, stage III decubitus ulcer, CKD stage III, general anxiety disorder. Visit patient awake alert and oriented to time person and place. Patient denies any chest pain, chest discomfort, abdominal pain, back pain, neck pain.   Patient sister on the bedside report that patient had been having bright red blood in nephrostomy tube over last 3 days.  Today patient nursing home notify abnormal lab work which patient was transferred to [facility] ED.     Patient is chronic critical ill with PEG tube for nutritional, divers colostomy bag for stage III decubitus ulcer, nephrostomy tube drain bright red blood. Patient living in nursing home, denies alcohol use, tobacco use.     Patient was admitted and managed for ALVIN secondary to dehydration and possible obstruction.      ED work-up: Afebrile with BP elevate. BMP show hypokalemia with potassium 5.4, serum osmolarity elevate 322.1. EKG showed sinus tachycardia with PVC, St & T wave abnormality. Patient is asymptomatic for chest pain. CBC showed anemia with hemoglobin 7.5 which reduced compared to her baseline at 8 on 9. \"    4/13: pt doing okay, sitting up in bed with family at bedside. Denies fever/chills/CP/SOB. Still has dark blood noted in her nephrostomy bag. Denies any burning at the site of her catheter. 4/14: pt seen having returned from [facility] today and was noted to have episode of nausea/choking / coughing and a small amount of whitish emesis (barium). Pt denied abd pain and denied any SOB at that time. 4/15: pt lying in bed, no complaints. Still with blood in her nephrostomy bag. Denies SOB/cough. No fever/chills. No CP. A&Ox4.     4/16: pt doing fine, lying in bed. Offers no new complaints. Had HD today. Still with blood in her nephrostomy bag. Hgb remains stable. 4/17: pt continues to have these choking like episodes where it appears she cannot breathe. She denies any sort of dysphagia or choking sensation, but notes that she feels nauseated, like \"dry heaving\". No fever/chills. Kidney function improving. Seems in brighter spirits today. 4/18: pt reports she is doing okay. No CP/SOB, working with her acapella. Still has blood in her nephrostomy bag. No fever/chills. Subjective (past 24 hours):   4/19: no new complaints, discussed with nurse.  Unable to wean off the 1L NC as pt drops into the 80's. Encouraged to continue with incentive spirometry. Hold ASA / anticoagulation pending possible tunneled cath tomorrow. Past medical history, family history, social history and allergies reviewed again and is unchanged since admission. ROS (All review of systems completed. Pertinent positives noted. Otherwise All other systems reviewed and negative.)     Medications:  Reviewed    Infusion Medications    sodium chloride 50 mL/hr at 04/17/22 2220    dextrose      sodium chloride Stopped (04/16/22 0220)    sodium chloride       Scheduled Medications    metoprolol tartrate  25 mg Oral BID    ciprofloxacin  400 mg IntraVENous Q24H    guaiFENesin  600 mg Oral BID    amoxicillin  250 mg Oral Q24H    sodium hypochlorite   Irrigation Daily    silver nitrate applicators  1 each Topical Once    miconazole   Topical BID    [Held by provider] aspirin  81 mg Oral Daily    [Held by provider] bumetanide  0.5 mg Oral Daily    famotidine  20 mg PEG Tube Every Other Day    ferrous sulfate  325 mg Oral Every Other Day    FLUoxetine  40 mg Oral Daily    sodium chloride flush  5-40 mL IntraVENous 2 times per day     PRN Meds: ipratropium-albuterol, ondansetron, glucagon (rDNA), dextrose, glucose, dextrose bolus (hypoglycemia) **OR** dextrose bolus (hypoglycemia), acetaminophen **OR** acetaminophen, melatonin, sodium chloride flush, sodium chloride, sodium chloride      Intake/Output Summary (Last 24 hours) at 4/19/2022 1444  Last data filed at 4/19/2022 0506  Gross per 24 hour   Intake 534.56 ml   Output 900 ml   Net -365.44 ml       Diet:  ADULT TUBE FEEDING; PEG; Renal Formula; Cyclic; 40; 6:65 PM; 0:70 AM; 30; Other (specify); 30 mls before and after cyclic feeding  ADULT DIET; Dysphagia - Soft and Bite Sized; Moderately Thick (Honey);  No Drinking Straws  ADULT ORAL NUTRITION SUPPLEMENT; Breakfast, Lunch, Dinner; Frozen Oral Supplement    Exam:  BP (!) 180/77   Pulse 71   Temp 98.6 °F (37 °C) (Oral) Resp 16   Ht 4' 9\" (1.448 m)   Wt 192 lb 7.4 oz (87.3 kg)   SpO2 97%   BMI 41.65 kg/m²   General appearance: chronically ill appearing, no apparent distress, appears stated age and cooperative. HEENT: Pupils equal, round, and reactive to light. Conjunctivae/corneas clear. Neck: Supple, with full range of motion. No jugular venous distention. Trachea midline. Respiratory:  Normal respiratory effort. Clear to auscultation, bilaterally without Rales/Wheezes/Rhonchi. Cardiovascular: Regular rate and rhythm with normal S1/S2 without murmurs, rubs or gallops. Abdomen: Soft, non-tender, non-distended with normal bowel sounds. L side nephrostomy tube with blood in bag  Musculoskeletal: passive and active ROM x 4 extremities. Skin: Skin color, texture, turgor normal.  No rashes or lesions. Neurologic:  Neurovascularly intact without any focal sensory/motor deficits. Cranial nerves: II-XII intact, grossly non-focal.  Psychiatric: Alert and oriented x4, thought content appropriate, normal insight  Capillary Refill: Brisk,< 3 seconds   Peripheral Pulses: +2 palpable, equal bilaterally     Labs:   Recent Labs     04/17/22  0450 04/18/22  1032 04/19/22  0614   WBC 12.2* 12.3* 13.1*   HGB 7.3* 7.1* 7.2*   HCT 25.4* 24.9* 25.1*    188 164     Recent Labs     04/17/22  0450 04/18/22  0522 04/19/22  0614    136 136   K 4.1 4.1 4.1   CL 99 99 101   CO2 28 23 23   BUN 47* 54* 62*   CREATININE 2.7* 3.2* 3.7*   CALCIUM 8.1* 8.5 8.2*     No results for input(s): AST, ALT, BILIDIR, BILITOT, ALKPHOS in the last 72 hours. No results for input(s): INR in the last 72 hours. No results for input(s): [place] in the last 72 hours.     Microbiology:    Blood culture #1:   Lab Results   Component Value Date    BC No growth-preliminary No growth  01/21/2022       Blood culture #2:No results found for: BLOODCULT2    Organism:  Lab Results   Component Value Date    ORG Pseudomonas aeruginosa 04/12/2022    ORG Klebsiella pneumoniae 04/12/2022    ORG Enterococcus faecalis - (Group D) 04/12/2022         Lab Results   Component Value Date    LABGRAM  02/22/2022     Many segmented neutrophils observed. No epithelial cells observed. No bacteria seen. MRSA culture only:No results found for: [address]     Urine culture:   Lab Results   Component Value Date    LABURIN No growth-preliminary  01/20/2022    LABURIN [place] count: 1,000 CFU/mL  01/20/2022       Respiratory culture: No results found for: CULTRESP    Aerobic and Anaerobic :  Lab Results   Component Value Date    LABAERO  02/22/2022     No growth-preliminary Current antibiotic therapy ineffective in vitro for at least one of culture isolates. LABAERO light growth  02/22/2022    LABAERO  02/22/2022     very light growth Isolates of Methicillin Resistant Staphylococcus coagulase negative (MRSE) do NOT require CONTACT isolation. Methicillin(Oxacillin)resistant strains of staphylococci (MRSA)or(MRSE)should be considered resistant to all classes of cephalosporins, penems and beta-lactams. Lab Results   Component Value Date    LABANAE  02/22/2022     No anaerobes isolated- preliminary No anaerobes isolated        Urinalysis:      Lab Results   Component Value Date    NITRU NEGATIVE 04/12/2022    WBCUA 50-75W/CLUMPS 04/12/2022    BACTERIA FEW 04/12/2022    RBCUA > 200 04/12/2022    BLOODU LARGE 04/12/2022    SPECGRAV 1.014 01/20/2022    GLUCOSEU NEGATIVE 04/12/2022       Radiology:  XR CHEST PORTABLE   Final Result   1. Small left pleural effusion. 2. Left lower lobe opacity may relate to infiltrate or atelectasis. 3. Other findings as described above. **This report has been created using voice recognition software. It may contain minor errors which are inherent in voice recognition technology. **      Final report electronically signed by Dr Shanon Peters on 4/18/2022 4:35 PM      XR FOOT LEFT (2 VIEWS)   Final Result   No acute finding. **This report has been created using voice recognition software. It may contain minor errors which are inherent in voice recognition technology. **      Final report electronically signed by Dr. José Miguel Tariq on 4/15/2022 4:18 PM      IR FLUORO GUIDED NEEDLE PLACEMENT   Final Result   Status post successful nontunneled dialysis catheter insertion. **This report has been created using voice recognition software. It may contain minor errors which are inherent in voice recognition technology. **      Final report electronically signed by Dr Doreen Bourgeois on 4/15/2022 12:06 PM      XR CHEST PORTABLE   Final Result   1. Left lower lung atelectasis/infiltrate. 2. Mild stable cardiomegaly. **This report has been created using voice recognition software. It may contain minor errors which are inherent in voice recognition technology. **      Final report electronically signed by Dr. Julia Landaverde on 4/14/2022 11:54 AM      FL MODIFIED BARIUM SWALLOW W VIDEO   Final Result   Laryngeal penetration and aspiration with thin consistency. Laryngeal penetration with nectar thick. Recommendations available from speech therapy            **This report has been created using voice recognition software. It may contain minor errors which are inherent in voice recognition technology. **      Final report electronically signed by Dr. José Miguel Tariq on 4/14/2022 12:39 PM      IR GUIDED NEPHROSTOGRAM/URETEROGRAM EXISTING ACCESS   Final Result   The tip of the nephrostomy tube is coiled in the left renal collecting system. **This report has been created using voice recognition software. It may contain minor errors which are inherent in voice recognition technology. **         Final report electronically signed by Dr Doreen Bourgeois on 4/14/2022 9:08 AM      CT ABDOMEN PELVIS WO CONTRAST Additional Contrast? None   Final Result   1.  Small bilateral pleural effusions with adjacent atelectasis/infiltrate. 2. Left-sided perinephric stranding and fluid collection adjacent to a left-sided nephrostomy tube, likely a subcapsular hematoma. Stable calcifications in the left kidney. 3. Cholelithiasis. 4. Sacral decubitus ulcer. Final report electronically signed by Dr. Megha Garcia on 4/12/2022 5:00 PM      US RENAL COMPLETE   Final Result   1. Significantly limited exam.   2. Increased renal cortical echogenicity bilaterally, compatible with    medical renal disease. 3. A left-sided nephrostomy tube is partially visualized. 4. Multiple left-sided renal calculi. 5. Mild left-sided caliectasis. The left renal pelvis is nondilated. 6. Nonspecific perinephric or subcapsular fluid is noted adjacent to the    left kidney, measuring 5.3 x 2.1 x 1.4 cm. This document has been electronically signed by: Carlos Manuel Parham M.D. on    04/12/2022 02:30 AM        CT ABDOMEN PELVIS WO CONTRAST Additional Contrast? None    Result Date: 4/12/2022  PROCEDURE: CT ABDOMEN PELVIS WO CONTRAST CLINICAL INFORMATION: Left staghorn calculus TECHNIQUE: CT of the abdomen and pelvis was performed without use of intravenous contrast. Axial images as well as sagittal and coronal reconstructions were obtained. All CT scans at this facility use dose modulation, iterative reconstruction, and/or weight-based dosing when appropriate to reduce radiation dose to as low as reasonably achievable. COMPARISON: CT abdomen and pelvis 3/13/2022 FINDINGS: Lower thorax: There are small bilateral pleural effusions. There is adjacent lung consolidation are present. The heart is enlarged. Abdomen: Evaluation is limited due to absence of contrast. There is no free intraperitoneal air. A gastrostomy tube is in the stomach. A left mid abdominal ostomy is stable and contains loops of bowel. There is no bowel obstruction. Calcifications are present in the lumen of the gallbladder. A nephrostomy tube is now present in the left kidney. Fluid adjacent to the tube and kidney has noncontrast mean Hounsfield units of 12 (image 34). Subcapsular fluid measures up to 2.2 cm in width (image 38). Perinephric stranding is again noted. Calcifications in the left kidney are stable. Left-sided hydronephrosis is not significantly changed. Hypoattenuating lesions in the kidneys may be cysts but are incompletely characterized on the current study. There  are calcified granulomas in the liver and spleen. Atherosclerotic calcifications are present in the abdominal aorta without evidence of aneurysm. There is no mesenteric or retroperitoneal lymphadenopathy. Degenerative changes are seen in the thoracolumbar spine without evidence of aggressive osseous lesions. Pelvis: There is a Padilla catheter in a nondistended urinary bladder, likely accounting for gas in the bladder. Platelets are present in the pelvis. There are calcifications in the uterus. There is no pelvic or inguinal lymphadenopathy. There is persistent subcutaneous tissue irregularity posterior to the sacrum. Degenerative changes are present in the pelvis without evidence of aggressive osseous lesions. 1. Small bilateral pleural effusions with adjacent atelectasis/infiltrate. 2. Left-sided perinephric stranding and fluid collection adjacent to a left-sided nephrostomy tube, likely a subcapsular hematoma. Stable calcifications in the left kidney. 3. Cholelithiasis. 4. Sacral decubitus ulcer. Final report electronically signed by Dr. Claudette Loth on 4/12/2022 5:00 PM    US RENAL COMPLETE    Result Date: 4/12/2022  RENAL ULTRASOUND COMPARISON: 1/3/2022. FINDINGS: Examination is significantly limited due to patient positioning and immobility. Increased renal cortical echogenicity is noted bilaterally, compatible with medical renal disease. Multiple shadowing calculi are noted within the left kidney. For example there is a 2.7 cm stone in the left kidney on image 41.  Left-sided nephrostomy tube is partially visualized. Mild caliectasis is noted in the left kidney. Left renal pelvis is nondilated. Nonspecific perinephric or subcapsular fluid is noted adjacent to the left kidney, measuring 5.3 x 2.1 x 1.4 cm on image 49. Urinary bladder contains a Padilla catheter. 1. Significantly limited exam. 2. Increased renal cortical echogenicity bilaterally, compatible with medical renal disease. 3. A left-sided nephrostomy tube is partially visualized. 4. Multiple left-sided renal calculi. 5. Mild left-sided caliectasis. The left renal pelvis is nondilated. 6. Nonspecific perinephric or subcapsular fluid is noted adjacent to the left kidney, measuring 5.3 x 2.1 x 1.4 cm.  This document has been electronically signed by: Aditi Snyder M.D. on 04/12/2022 02:30 AM      Electronically signed by Kandice Villegas PA-C on 4/19/2022 at 2:44 PM

## 2022-04-19 NOTE — PLAN OF CARE
Problem: Falls - Risk of:  Goal: Will remain free from falls  Description: Will remain free from falls  Outcome: Ongoing  Goal: Absence of physical injury  Description: Absence of physical injury  Outcome: Ongoing     Problem: Skin Integrity:  Goal: Will show no infection signs and symptoms  Description: Will show no infection signs and symptoms  Outcome: Ongoing  Goal: Absence of new skin breakdown  Description: Absence of new skin breakdown  Outcome: Ongoing     Problem: Confusion - Acute:  Goal: Absence of continued neurological deterioration signs and symptoms  Description: Absence of continued neurological deterioration signs and symptoms  Outcome: Ongoing  Goal: Mental status will be restored to baseline  Description: Mental status will be restored to baseline  Outcome: Ongoing     Problem: Discharge Planning:  Goal: Ability to perform activities of daily living will improve  Description: Ability to perform activities of daily living will improve  Outcome: Ongoing  Goal: Participates in care planning  Description: Participates in care planning  Outcome: Ongoing     Problem: Injury - Risk of, Physical Injury:  Goal: Will remain free from falls  Description: Will remain free from falls  Outcome: Ongoing  Goal: Absence of physical injury  Description: Absence of physical injury  Outcome: Ongoing     Problem: Mood - Altered:  Goal: Mood stable  Description: Mood stable  Outcome: Ongoing  Goal: Absence of abusive behavior  Description: Absence of abusive behavior  Outcome: Ongoing  Goal: Verbalizations of feeling emotionally comfortable while being cared for will increase  Description: Verbalizations of feeling emotionally comfortable while being cared for will increase  Outcome: Ongoing     Problem: Psychomotor Activity - Altered:  Goal: Absence of psychomotor disturbance signs and symptoms  Description: Absence of psychomotor disturbance signs and symptoms  Outcome: Ongoing     Problem: Sensory Perception - Impaired:  Goal: Demonstrations of improved sensory functioning will increase  Description: Demonstrations of improved sensory functioning will increase  Outcome: Ongoing  Goal: Decrease in sensory misperception frequency  Description: Decrease in sensory misperception frequency  Outcome: Ongoing  Goal: Able to refrain from responding to false sensory perceptions  Description: Able to refrain from responding to false sensory perceptions  Outcome: Ongoing  Goal: Demonstrates accurate environmental perceptions  Description: Demonstrates accurate environmental perceptions  Outcome: Ongoing  Goal: Able to distinguish between reality-based and nonreality-based thinking  Description: Able to distinguish between reality-based and nonreality-based thinking  Outcome: Ongoing  Goal: Able to interrupt nonreality-based thinking  Description: Able to interrupt nonreality-based thinking  Outcome: Ongoing     Problem: Sleep Pattern Disturbance:  Goal: Appears well-rested  Description: Appears well-rested  Outcome: Ongoing     Problem: Nutrition  Goal: Optimal nutrition therapy  4/19/2022 0217 by Terri Du RN  Outcome: Ongoing  4/18/2022 1220 by Nancy Tristan RD, LD  Outcome: Ongoing     Problem: DISCHARGE BARRIERS  Goal: Patient's continuum of care needs are met  Outcome: Ongoing     Problem: Pain:  Goal: Pain level will decrease  Description: Pain level will decrease  Outcome: Ongoing  Goal: Control of acute pain  Description: Control of acute pain  Outcome: Ongoing  Goal: Control of chronic pain  Description: Control of chronic pain  Outcome: Ongoing

## 2022-04-19 NOTE — PROGRESS NOTES
Progress note: Infectious diseases    Patient - Marilyn Burton,  Age - 73 y.o.    - 1950      Room Number - 6K-18/018-A   MRN -  [id number]   Acct # - [de-identified]  Date of Admission -  2022  8:20 PM    SUBJECTIVE:   No  New issues. OBJECTIVE   VITALS    height is 4' 9\" (1.448 m) and weight is 192 lb 7.4 oz (87.3 kg). Her oral temperature is 98.6 °F (37 °C). Her blood pressure is 179/81 (abnormal) and her pulse is 71. Her respiration is 16 and oxygen saturation is 97%. Wt Readings from Last 3 Encounters:   22 192 lb 7.4 oz (87.3 kg)   22 134 lb (60.8 kg)   22 133 lb (60.3 kg)       I/O (24 Hours)    Intake/Output Summary (Last 24 hours) at 2022 1707  Last data filed at 2022 0506  Gross per 24 hour   Intake 474.56 ml   Output 900 ml   Net -425.44 ml       General Appearance  Awake, alert, oriented,  Chronically sick looking  HEENT - normocephalic, atraumatic, pale  conjunctiva,  anicteric sclera  Neck - Supple, no mass  Lungs -  Bilateral   air entry, diminished breath sound at the lung bases  Cardiovascular - Heart sounds are normal.     Abdomen - soft, not distended, nontender, left nephrostomy tube.  +hematuria  Neurologic -oriented  Skin - No bruising or bleeding  Extremities - + edema,    Left nephrostomy tube    MEDICATIONS:      metoprolol tartrate  25 mg Oral BID    ciprofloxacin  400 mg IntraVENous Q24H    guaiFENesin  600 mg Oral BID    amoxicillin  250 mg Oral Q24H    sodium hypochlorite   Irrigation Daily    silver nitrate applicators  1 each Topical Once    miconazole   Topical BID    [Held by provider] aspirin  81 mg Oral Daily    [Held by provider] bumetanide  0.5 mg Oral Daily    famotidine  20 mg PEG Tube Every Other Day    ferrous sulfate  325 mg Oral Every Other Day    FLUoxetine  40 mg Oral Daily    sodium chloride flush  5-40 mL IntraVENous 2 times per day      sodium chloride 50 mL/hr at 04/17/22 2220    dextrose      sodium chloride Stopped (04/16/22 0220)    sodium chloride       ipratropium-albuterol, ondansetron, glucagon (rDNA), dextrose, glucose, dextrose bolus (hypoglycemia) **OR** dextrose bolus (hypoglycemia), acetaminophen **OR** acetaminophen, melatonin, sodium chloride flush, sodium chloride, sodium chloride      LABS:     CBC:   Recent Labs     04/17/22  0450 04/18/22  1032 04/19/22  0614   WBC 12.2* 12.3* 13.1*   HGB 7.3* 7.1* 7.2*    188 164     BMP:    Recent Labs     04/17/22  0450 04/18/22  0522 04/19/22  0614    136 136   K 4.1 4.1 4.1   CL 99 99 101   CO2 28 23 23   BUN 47* 54* 62*   CREATININE 2.7* 3.2* 3.7*   GLUCOSE 102 106 96     Calcium:  Recent Labs     04/19/22  0614   CALCIUM 8.2*      Recent Labs     04/19/22  0553 04/19/22  1052 04/19/22  1608   POCGLU 104 103 96        CULTURES:   UA: No results for input(s): SPECGRAV, PHUR, COLORU, CLARITYU, MUCUS, PROTEINU, BLOODU, RBCUA, WBCUA, BACTERIA, NITRU, GLUCOSEU, BILIRUBINUR, UROBILINOGEN, KETUA, LABCAST, LABCASTTY, AMORPHOS in the last 72 hours. Invalid input(s): CRYSTALS  Micro:   Lab Results   Component Value Date    BC No growth-preliminary No growth  01/21/2022        Problem list of patient:     Patient Active Problem List   Diagnosis Code    Essential hypertension I10    Chronic depression F32. A    CHF (congestive heart failure) (HCC) I50.9    Thrombocytopenia (HCC) D69.6    Moderate episode of recurrent major depressive disorder (HCC) F33.1    Uremia N19    Hyperkalemia E87.5    Isolated non-nephrotic proteinuria R80.0    ALVIN (acute kidney injury) ([facility] N17.9    Chronic right-sided heart failure (HCC) I50.812    Pulmonary HTN (HCC) I27.20    Hypotension due to hypovolemia I95.89, E86.1    Acute renal failure superimposed on stage 4 chronic kidney disease (HCC) N17.9, N18.4    Pressure injury of sacral region, stage 4 (HCC) L89.154    Acute respiratory failure ([facility]) J96.00    Flash pulmonary edema ([facility]) J81.0    Shock ([facility] [address] [facility] R57.9    Aspiration pneumonia of left lung ([facility]) J69.0    Pleural effusion J90    Paroxysmal atrial fibrillation ([facility]) I48.0    Hemoptysis R04.2    Chronic respiratory failure with hypoxia ([facility]) J96.11    Pneumothorax, right J93.9    Collapse of left lung J98.11    Abnormal chest x-ray R93.89    Acute on chronic respiratory failure with hypoxia ([facility]) J96.21    Retroperitoneal hematoma K66.1    HAP (hospital-acquired pneumonia) J18.9, Y95    Colostomy in place ([facility]) Z93.3    PEG (percutaneous endoscopic gastrostomy) status ([facility]) Z93.1    Intertriginous dermatitis associated with moisture L30.4    Peristomal dermatitis associated with moisture L30.8    Nephrostomy tube displaced ([facility] [address] T83.022A    Open wound of second toe of right foot S91.104A    Open wound of second toe of left foot Y46.909C    Metabolic acidosis C84.3         ASSESSMENT/PLAN   Hematuria  UTI  CHF  Hypertension  ALVIN  Deconditioning  She has multipe comorbidities:   Gil Darby MD, MD, FACP 4/19/2022 5:07 PM

## 2022-04-19 NOTE — PROGRESS NOTES
[facility]         Renal Inpatient Follow-Up note         4/19/2022 9:56 AM    Pt Name:   Violet Kerr  YOB: 1950  Attending:   Mannie Payne PA-C    Chief Complaint : Violet Kerr is a 73 y.o. female being followed by nephrology for ALVIN    Interval History :   Patient seen and examined at bedside. No acute distress or complaints. No overnight events. Feels good. No CP or SOB. Urine output is increasing. Mental status is good, alert & oriented. Tube feeds were restarted. Scheduled Medications :    ciprofloxacin  400 mg IntraVENous Q24H    guaiFENesin  600 mg Oral BID    amoxicillin  250 mg Oral Q24H    sodium hypochlorite   Irrigation Daily    silver nitrate applicators  1 each Topical Once    miconazole   Topical BID    [Held by provider] aspirin  81 mg Oral Daily    [Held by provider] bumetanide  0.5 mg Oral Daily    famotidine  20 mg PEG Tube Every Other Day    ferrous sulfate  325 mg Oral Every Other Day    FLUoxetine  40 mg Oral Daily    metoprolol tartrate  12.5 mg Oral BID    sodium chloride flush  5-40 mL IntraVENous 2 times per day      sodium chloride 50 mL/hr at 04/17/22 2220    dextrose      sodium chloride Stopped (04/16/22 0220)    sodium chloride         Vitals :  BP (!) 163/74   Pulse 74   Temp 98.7 °F (37.1 °C) (Oral)   Resp 16   Ht 4' 9\" (1.448 m)   Wt 192 lb 7.4 oz (87.3 kg)   SpO2 97%   BMI 41.65 kg/m²     24HR INTAKE/OUTPUT:      Intake/Output Summary (Last 24 hours) at 4/19/2022 0956  Last data filed at 4/19/2022 0506  Gross per 24 hour   Intake 594.56 ml   Output 900 ml   Net -305.44 ml     Last 3 weights  Wt Readings from Last 3 Encounters:   04/19/22 192 lb 7.4 oz (87.3 kg)   03/29/22 134 lb (60.8 kg)   03/13/22 133 lb (60.3 kg)           Physical Exam :  General -- well developed and well nourished, chronically ill appearing. HEENT-normal external appearance of both ears and nose.   Mouth/throat  - oropharynx appears to be clear and moist  Eyes-conjunctiva normal no icterus or pallor. Neck-normal range of motion supple no JVD. Lungs -- clear  Heart -- S1, S2 heard, JVD - no  Abdomen - soft, non-tender  Extremities -- edema --no significant edema noted  CNS - awake and alert  Psychiatric-mood and memory appears normal         Last 3 CBC   Recent Labs     04/17/22  0450 04/18/22  1032 04/19/22  0614   WBC 12.2* 12.3* 13.1*   RBC 2.58* 2.49* 2.52*   HGB 7.3* 7.1* 7.2*   HCT 25.4* 24.9* 25.1*    188 164     Last 3 CMP  Recent Labs     04/17/22  0450 04/18/22  0522 04/19/22  0614    136 136   K 4.1 4.1 4.1   CL 99 99 101   CO2 28 23 23   BUN 47* 54* 62*   CREATININE 2.7* 3.2* 3.7*   CALCIUM 8.1* 8.5 8.2*             ASSESSMENT / Plan   1. Renal -acute kidney injury on chronic kidney disease stage III due to ATN intradialytic creatinine rising   ? Renal ultrasound scan does not show any acute abnormalities. ? Started on renal replacement therapy, had 2 treatments so far  ? Urine output is picking up. No acute need for dialysis today, if creatinine continues to rise tomorrow she will need HD tomorrow. May need hemodialysis outpatient as well. ? Will hold off on tunneled dialysis catheter placement until tomorrow when evaluated if she needs further HD sessions. Continue to hold anticoagulation and antiplatelets. ? Continue IV fluids  ? Monitor BMP  2. Electrolytes -within normal limit  3. Primary HTN -increased Lopressor to 25 mg twice daily from 12.5 mg twice daily  4. Mild acidosis secondary to renal dysfunction-improved with dialysis  5. Anemia probably due to bleeding through the nephrostomy tube. Status post blood transfusion. 6. Bleeding from the nephrostomy tube urology has been consulted status post nephrostogram appears to be in good position  7. Hx of chronic diastolic congestive heart failure appears to be well compensated hold diuretics for now  8. Essential hypertension-stable  9.  Meds reviewed and discussed with patient and nursing staff      Dr. Jagjit Devine DO   Case discussed with Dr. Salvador Rao and [facility].

## 2022-04-19 NOTE — CARE COORDINATION
4/19/22, 2:48 PM EDT    DISCHARGE ON GOING EVALUATION    Helen Mcdermott day: 7  Location: [facility]18/018-A Reason for admit: [facility]acute [facility] [N17.9]  Acute renal failure superimposed on chronic kidney disease, unspecified CKD stage, unspecified acute renal failure type ([facility] [address] [N17.9, N18.9]   Procedure:  4/15 temp HD cath  Barriers to Discharge: Creat 3.7. No HD today. Urine output is improving. Dr. Tor Zarco will decide tomorrow if tunneled cath and OP dialysis are needed. PCP: Divina Bliss MD  Readmission Risk Score: 26.1 ( )%  Patient Goals/Plan/Treatment Preferences: Return to [place] [address]. Possible new OP HD.

## 2022-04-19 NOTE — PROGRESS NOTES
[address] [address]  INPATIENT SPEECH THERAPY  [facility] RENAL TELEMETRY 6K  DAILY NOTE    TIME   SLP Individual Minutes  Time In: 5100  Time Out: 4255  Minutes: 13  Timed Code Treatment Minutes: 0 Minutes       Date: 2022  Patient Name: Afia Bateman      CSN: [id number]   : 1950  (73 y.o.)  Gender: female   Referring Physician: Claudia Arroyo PA-C   Diagnosis: ALVIN  Precautions: Aspiration risks/precautions, fall risk   Current Diet: Soft/bite sized diet and moderately thick liquids   Swallowing Strategies: Full Upright Position, Small Bite/Sip, No Straw, Pulmonary Monitoring, Medication in Applesauce, Alternate Solids and Liquids, Limit Distractions and Monitor for Fatigue, NO ICE   Date of Last MBS/FEES: MBS on     Pain:  No pain reported. Subjective:  NOAM Loyd approved ST dysphagia treatment session. The pt is alert, pleasant, and agreeable for dysphagia treatment. Patient consuming lunch tray upon ST arrival with sister at bedside. Short-Term Goals:  SHORT TERM GOAL #1:  Goal 1: Patient will consume a soft/bite sized diet and moderately thick liquids without overt s/s of airway invasion to meet nutritional/hydration measures safely. INTERVENTIONS: Patient finishing lunch tray upon ST arrival. Patient consuming magic cup. Patient and sister endorsing good success with lunch meal this date; however, patient did not order moderately thick drink. Patient observed with ice/thin water in cup on tray table. ST completed education with patient and sister regarding no thin liquids; patient and sister receptive to education. With magic cup, patient demonstrated evidence of adequate bolus control and manipulation, adequate bolus formation with complete bolus clearance, suspected timely swallow initiation, and concerns for pharyngeal residue d/t presence of wet/gurgly vocal quality x1 that cleared with throat clear + re-swallow.     ST completed review green swallow guide handing in room re: recommended swallow strategies to prevent airway invasion events and meet nutrition/hydration needs. *Full Upright Position  *Small Bite/Sip  *No Straw  *Pulmonary Monitoring  *Medication in Applesauce  *Alternate Solids and Liquids  *Limit Distractions  *Monitor for Fatigue   *NO ICE       SHORT TERM GOAL #2:  Goal 2: Patient will consume advanced PO solids and thin liquids with use of bolus hold maneuver with ST only without overt s/s of airway invasion to progress solids and determine appropriateness for repeat instrumental assessment. INTERVENTIONS: Did not address d/t focus on other goals. SHORT TERM GOAL #3:  Goal 3: Patient will complete pharyngeal strengthening exercises (i.e. effortful, joaquina) x10 for improved pharyngeal shortening for enhanced airway protection. INTERVENTIONS: ST provided skilled instruction and rationale for effortful swallow this date. Effortful Swallow with po consumption: 15 reps x 1 set fair success - suspect decreased \"swallow hard\" despite max cues. SHORT TERM GOAL #4:  Goal 4: Complete repeat MBS in 1-2 weeks to further assess and determine ability to advance liquid intake pending on level of pharyngeal dysfunction still present. INTERVENTIONS: Consider repeat MBS 4/21    SHORT TERM GOAL #5:  Goal 5: Monitor cognitive functioning and complete further assessment as deemed clinically indicated. INTERVENTIONS: Did not address this date d/t focus on other goals.        Long-Term Goals:    No established LTG's given short ELOS          EDUCATION:  Learner: Patient  Education:  Reviewed results and recommendations of this evaluation, Reviewed diet and strategies, Reviewed ST goals and Plan of Care, Reviewed recommendations for follow-up and Education Related to Prevention of Recurrence of Impairment/Illness/Injury  Evaluation of Education: Verbalizes understanding, Demonstrates with assistance, Needs further instruction and Family not present    ASSESSMENT/PLAN:  Activity Tolerance:  Patient tolerance of  treatment: good. Assessment/Plan: Patient progressing toward established goals. Continues to require skilled care of licensed speech pathologist to progress toward achievement of established goals and plan of care.      Plan for Next Session: Dysphagia rehabiliation     [facility]) [address]

## 2022-04-20 LAB
ANION GAP SERPL CALCULATED.3IONS-SCNC: 11 MEQ/L (ref 8–16)
BUN BLDV-MCNC: 66 MG/DL (ref 7–22)
CALCIUM SERPL-MCNC: 8.1 MG/DL (ref 8.5–10.5)
CHLORIDE BLD-SCNC: 98 MEQ/L (ref 98–111)
CO2: 23 MEQ/L (ref 23–33)
CREAT SERPL-MCNC: 3.6 MG/DL (ref 0.4–1.2)
ERYTHROCYTE [DISTWIDTH] IN BLOOD BY AUTOMATED COUNT: 15.5 % (ref 11.5–14.5)
ERYTHROCYTE [DISTWIDTH] IN BLOOD BY AUTOMATED COUNT: 55.9 FL (ref 35–45)
GFR SERPL CREATININE-BSD FRML MDRD: 13 ML/MIN/1.73M2
GLUCOSE BLD-MCNC: 101 MG/DL (ref 70–108)
GLUCOSE BLD-MCNC: 111 MG/DL (ref 70–108)
GLUCOSE BLD-MCNC: 116 MG/DL (ref 70–108)
GLUCOSE BLD-MCNC: 92 MG/DL (ref 70–108)
GLUCOSE BLD-MCNC: 99 MG/DL (ref 70–108)
HCT VFR BLD CALC: 24.2 % (ref 37–47)
HEMOGLOBIN: 7.1 GM/DL (ref 12–16)
MCH RBC QN AUTO: 29.1 PG (ref 26–33)
MCHC RBC AUTO-ENTMCNC: 29.3 GM/DL (ref 32.2–35.5)
MCV RBC AUTO: 99.2 FL (ref 81–99)
PLATELET # BLD: 180 THOU/MM3 (ref 130–400)
PMV BLD AUTO: 8.8 FL (ref 9.4–12.4)
POTASSIUM SERPL-SCNC: 4 MEQ/L (ref 3.5–5.2)
RBC # BLD: 2.44 MILL/MM3 (ref 4.2–5.4)
SODIUM BLD-SCNC: 132 MEQ/L (ref 135–145)
WBC # BLD: 12.3 THOU/MM3 (ref 4.8–10.8)

## 2022-04-20 PROCEDURE — 99232 SBSQ HOSP IP/OBS MODERATE 35: CPT | Performed by: INTERNAL MEDICINE

## 2022-04-20 PROCEDURE — 2500000003 HC RX 250 WO HCPCS: Performed by: INTERNAL MEDICINE

## 2022-04-20 PROCEDURE — 6370000000 HC RX 637 (ALT 250 FOR IP): Performed by: PHYSICIAN ASSISTANT

## 2022-04-20 PROCEDURE — 36415 COLL VENOUS BLD VENIPUNCTURE: CPT

## 2022-04-20 PROCEDURE — 92526 ORAL FUNCTION THERAPY: CPT

## 2022-04-20 PROCEDURE — 85027 COMPLETE CBC AUTOMATED: CPT

## 2022-04-20 PROCEDURE — 6360000002 HC RX W HCPCS: Performed by: PHARMACIST

## 2022-04-20 PROCEDURE — 6370000000 HC RX 637 (ALT 250 FOR IP): Performed by: STUDENT IN AN ORGANIZED HEALTH CARE EDUCATION/TRAINING PROGRAM

## 2022-04-20 PROCEDURE — 82948 REAGENT STRIP/BLOOD GLUCOSE: CPT

## 2022-04-20 PROCEDURE — 2580000003 HC RX 258: Performed by: STUDENT IN AN ORGANIZED HEALTH CARE EDUCATION/TRAINING PROGRAM

## 2022-04-20 PROCEDURE — 80048 BASIC METABOLIC PNL TOTAL CA: CPT

## 2022-04-20 PROCEDURE — 1200000000 HC SEMI PRIVATE

## 2022-04-20 PROCEDURE — 6370000000 HC RX 637 (ALT 250 FOR IP): Performed by: INTERNAL MEDICINE

## 2022-04-20 PROCEDURE — 2500000003 HC RX 250 WO HCPCS: Performed by: STUDENT IN AN ORGANIZED HEALTH CARE EDUCATION/TRAINING PROGRAM

## 2022-04-20 RX ORDER — ONDANSETRON 2 MG/ML
4 INJECTION INTRAMUSCULAR; INTRAVENOUS EVERY 6 HOURS PRN
Status: DISCONTINUED | OUTPATIENT
Start: 2022-04-20 | End: 2022-04-28 | Stop reason: HOSPADM

## 2022-04-20 RX ORDER — BUMETANIDE 0.25 MG/ML
0.5 INJECTION, SOLUTION INTRAMUSCULAR; INTRAVENOUS ONCE
Status: COMPLETED | OUTPATIENT
Start: 2022-04-20 | End: 2022-04-20

## 2022-04-20 RX ORDER — BUMETANIDE 0.25 MG/ML
2 INJECTION, SOLUTION INTRAMUSCULAR; INTRAVENOUS ONCE
Status: COMPLETED | OUTPATIENT
Start: 2022-04-20 | End: 2022-04-20

## 2022-04-20 RX ADMIN — AMOXICILLIN 250 MG: 250 CAPSULE ORAL at 14:34

## 2022-04-20 RX ADMIN — BUMETANIDE 0.5 MG: 0.25 INJECTION, SOLUTION INTRAMUSCULAR; INTRAVENOUS at 09:46

## 2022-04-20 RX ADMIN — METOPROLOL TARTRATE 25 MG: 25 TABLET, FILM COATED ORAL at 08:44

## 2022-04-20 RX ADMIN — CIPROFLOXACIN 400 MG: 2 INJECTION, SOLUTION INTRAVENOUS at 22:41

## 2022-04-20 RX ADMIN — FERROUS SULFATE TAB 325 MG (65 MG ELEMENTAL FE) 325 MG: 325 (65 FE) TAB at 08:43

## 2022-04-20 RX ADMIN — SODIUM CHLORIDE, PRESERVATIVE FREE 10 ML: 5 INJECTION INTRAVENOUS at 20:39

## 2022-04-20 RX ADMIN — BUMETANIDE 2 MG: 0.25 INJECTION, SOLUTION INTRAMUSCULAR; INTRAVENOUS at 13:45

## 2022-04-20 RX ADMIN — FLUOXETINE 40 MG: 20 CAPSULE ORAL at 08:44

## 2022-04-20 RX ADMIN — GUAIFENESIN 600 MG: 600 TABLET, EXTENDED RELEASE ORAL at 08:44

## 2022-04-20 RX ADMIN — MICONAZOLE NITRATE: 20 POWDER TOPICAL at 20:39

## 2022-04-20 RX ADMIN — GUAIFENESIN 600 MG: 600 TABLET, EXTENDED RELEASE ORAL at 20:38

## 2022-04-20 RX ADMIN — DAKIN'S SOLUTION 0.125% (QUARTER STRENGTH): 0.12 SOLUTION at 18:06

## 2022-04-20 RX ADMIN — METOPROLOL TARTRATE 25 MG: 25 TABLET, FILM COATED ORAL at 20:38

## 2022-04-20 RX ADMIN — MICONAZOLE NITRATE: 20 POWDER TOPICAL at 08:46

## 2022-04-20 ASSESSMENT — PAIN SCALES - GENERAL: PAINLEVEL_OUTOF10: 0

## 2022-04-20 NOTE — PROGRESS NOTES
Hospitalist Progress Note      Patient:  Russ Lucio    Unit/Bed:6K-18/018-A  YOB: 1950  MRN: [id number]   Acct: [de-identified]   PCP: Macie Farah MD  Date of Admission: 4/11/2022    Assessment/Plan:    1. ALVIN on CKD stage III, improving: unclear etiology but some volume depletion contributing in additional to diuretic use per Nephro who has been consulted. Renal US without acute abnormalities. Aggressive IVFs. Avoid nephrotoxic agents / contrast. Renal US without acute abnormalities. S/p temporary dialysis catheter and RRT. 4/19: Nephro plans potential tunneled catheter tomorrow, no anticoagulation. Continue with IVF for now. Monitor labs in am for final decision, appreciate Nephro. 2. Acute hypoxic respiratory failure: on 1 L NC with SpO2 96%. Encourage IS / Acapella. CXR shows LLL infiltrate vs atelectasis. Suspect atelectasis with pt's presentation. Wean O2 as tolerated. 3. Hypomagnesemia, resolved: replaced  4. Hyperkalemia, resolved: Nephrology following. Check daily BMP. 5. Dislodged L nephrostomy tube s/p nephrostogram, resolved: no indication of dislodgement on CT A/P, IR has been consulted for assessment. Previously dislodged and replaced multiple times, last being 3/14/22.   4/13: Patient to undergo nephrostogram today  4/14: appropriate placement, continue flushes, red/brownish blood in bag  6. L staghorn calculus / bleeding L nephrostomy tube: Urology has been consulted. Repeat CT A/P from 4/12 shows L sided perinephric stranding and fluid collection adjacent to tube which is felt to likely be subcapsular hematoma. Continue with Q8 H&H checks. 4. Acute cystitis: chronic seaman catheter, UA shows few bacteria, moderate leukocyte esterase and 50-75 with clumps WBC. Urine culture returned growing nonfermenting gram-negative bacilli, enteric gram-negative bacilli, and gram-positive cocci.  Pt denies any burning at the site of her catheter. Will discuss with Urology if treatment is indicated at this time. Afebrile, no leukocytosis. 4/18: urine gx returned growing Pseudomonas or [place], Klebsiella pneumoniae and Enterococcus faecalis group D. Continue Cipro and add PO amoxicillin per ID (complete 7 days)  5. Acute on chronic normocytic anemia: Hg 7.5 on admission with baseline Hg 8.0s-9.0s. Bight red blood on nephrotomy tube. Repeated Hg 6.8. Transfuse 1 Unit RBC. Continue H& H q8hrs, transfusion if Hgb < 7  4/18: Hgb remains in 7 range, check Vit B12/iron studies  5. Mild-moderate dysphagia: seen by SLP, underwent MBS. Recommended honey thick liquids, no straws, and soft and bite sized. 6. Metabolic acidosis: on sodium bicarbonate per Nephro, continue to monitor. Will correct with HD. Resolved. 7. Elevated troponin: likely related to CKD, denies CP, EKG showed sinus tach with nonspecific ST/T wave change. 6. Chronic HFpEF without decompensation: TOBIAS 10/2021 EF 60% with trace TR. On Bumex 0.5 mg daily which is held due to ALVIN and dehydration  7. Essential HTN:slightly elevated, continue with home meds and monitor closely   8. Hx stage III decub ulcer s/p diverting colostomy 1/27/22: Wound care consulted and following  9. Anxiety: Continue home medications    Chief Complaint: Abnormal lab    Initial H and P:-    \"Kenzie Amaral is a 73 y.o., , female presented to [facility] ED for abnormal lab from nursing home. Past medical history significant with chronic diastolic heart failure, obstructive staghorn calculus of the left kidney not a candidate for stone treatment which nephrostomy tube was placed, stage III decubitus ulcer, CKD stage III, general anxiety disorder. Visit patient awake alert and oriented to time person and place. Patient denies any chest pain, chest discomfort, abdominal pain, back pain, neck pain.   Patient sister on the bedside report that patient had been having bright red blood in nephrostomy tube over last 3 days.  Today patient nursing home notify abnormal lab work which patient was transferred to [facility] ED.     Patient is chronic critical ill with PEG tube for nutritional, divers colostomy bag for stage III decubitus ulcer, nephrostomy tube drain bright red blood. Patient living in nursing home, denies alcohol use, tobacco use.     Patient was admitted and managed for ALVIN secondary to dehydration and possible obstruction.      ED work-up: Afebrile with BP elevate. BMP show hypokalemia with potassium 5.4, serum osmolarity elevate 322.1. EKG showed sinus tachycardia with PVC, St & T wave abnormality. Patient is asymptomatic for chest pain. CBC showed anemia with hemoglobin 7.5 which reduced compared to her baseline at 8 on 9. \"    4/13: pt doing okay, sitting up in bed with family at bedside. Denies fever/chills/CP/SOB. Still has dark blood noted in her nephrostomy bag. Denies any burning at the site of her catheter. 4/14: pt seen having returned from [facility] today and was noted to have episode of nausea/choking / coughing and a small amount of whitish emesis (barium). Pt denied abd pain and denied any SOB at that time. 4/15: pt lying in bed, no complaints. Still with blood in her nephrostomy bag. Denies SOB/cough. No fever/chills. No CP. A&Ox4.     4/16: pt doing fine, lying in bed. Offers no new complaints. Had HD today. Still with blood in her nephrostomy bag. Hgb remains stable. 4/17: pt continues to have these choking like episodes where it appears she cannot breathe. She denies any sort of dysphagia or choking sensation, but notes that she feels nauseated, like \"dry heaving\". No fever/chills. Kidney function improving. Seems in brighter spirits today. 4/18: pt reports she is doing okay. No CP/SOB, working with her acapella. Still has blood in her nephrostomy bag. No fever/chills. 4/19: no new complaints, discussed with nurse. Unable to wean off the 1L NC as pt drops into the 80's.  Encouraged to continue with incentive spirometry. Hold ASA / anticoagulation pending possible tunneled cath tomorrow. Subjective (past 24 hours): Patient seen this a.m. no complaints discussed the possible need for hemodialysis. On amoxicillin and ciprofloxacin for urinary tract infection. Past medical history, family history, social history and allergies reviewed again and is unchanged since admission. ROS (All review of systems completed. Pertinent positives noted. Otherwise All other systems reviewed and negative.)     Medications:  Reviewed    Infusion Medications    dextrose      sodium chloride Stopped (04/16/22 0220)    sodium chloride       Scheduled Medications    bumetanide  2 mg IntraVENous Once    metoprolol tartrate  25 mg Oral BID    ciprofloxacin  400 mg IntraVENous Q24H    guaiFENesin  600 mg Oral BID    amoxicillin  250 mg Oral Q24H    sodium hypochlorite   Irrigation Daily    silver nitrate applicators  1 each Topical Once    miconazole   Topical BID    [Held by provider] aspirin  81 mg Oral Daily    [Held by provider] bumetanide  0.5 mg Oral Daily    famotidine  20 mg PEG Tube Every Other Day    ferrous sulfate  325 mg Oral Every Other Day    FLUoxetine  40 mg Oral Daily    sodium chloride flush  5-40 mL IntraVENous 2 times per day     PRN Meds: ipratropium-albuterol, ondansetron, glucagon (rDNA), dextrose, glucose, dextrose bolus (hypoglycemia) **OR** dextrose bolus (hypoglycemia), acetaminophen **OR** acetaminophen, melatonin, sodium chloride flush, sodium chloride, sodium chloride      Intake/Output Summary (Last 24 hours) at 4/20/2022 1221  Last data filed at 4/20/2022 1100  Gross per 24 hour   Intake 148 ml   Output 1515 ml   Net -1367 ml       Diet:  ADULT TUBE FEEDING; PEG; Renal Formula; Cyclic; 40; 6:38 PM; 9:93 AM; 30; Other (specify); 30 mls before and after cyclic feeding  ADULT DIET; Dysphagia - Soft and Bite Sized; Moderately Thick (Honey);  No Drinking Straws  ADULT ORAL NUTRITION SUPPLEMENT; Breakfast, Lunch, Dinner; Frozen Oral Supplement    Exam:  BP (!) 146/70   Pulse 67   Temp 98.8 °F (37.1 °C) (Oral)   Resp 16   Ht 4' 9\" (1.448 m)   Wt 180 lb 12.4 oz (82 kg)   SpO2 99%   BMI 39.12 kg/m²   General appearance: chronically ill appearing, no apparent distress, appears stated age and cooperative. HEENT: Pupils equal, round, and reactive to light. Conjunctivae/corneas clear. Neck: Supple, with full range of motion. No jugular venous distention. Trachea midline. Respiratory:  Normal respiratory effort. Clear to auscultation, bilaterally without Rales/Wheezes/Rhonchi. Cardiovascular: Regular rate and rhythm with normal S1/S2 without murmurs, rubs or gallops. Abdomen: Soft, non-tender, non-distended with normal bowel sounds. L side nephrostomy tube with blood in bag  Musculoskeletal: passive and active ROM x 4 extremities. Skin: Skin color, texture, turgor normal.  No rashes or lesions. Neurologic:  Neurovascularly intact without any focal sensory/motor deficits. Cranial nerves: II-XII intact, grossly non-focal.  Psychiatric: Alert and oriented x4, thought content appropriate, normal insight  Capillary Refill: Brisk,< 3 seconds   Peripheral Pulses: +2 palpable, equal bilaterally     Labs:   Recent Labs     04/18/22  1032 04/19/22  0614 04/20/22  0423   WBC 12.3* 13.1* 12.3*   HGB 7.1* 7.2* 7.1*   HCT 24.9* 25.1* 24.2*    164 180     Recent Labs     04/18/22  0522 04/19/22  0614 04/20/22  0423    136 132*   K 4.1 4.1 4.0   CL 99 101 98   CO2 23 23 23   BUN 54* 62* 66*   CREATININE 3.2* 3.7* 3.6*   CALCIUM 8.5 8.2* 8.1*     No results for input(s): AST, ALT, BILIDIR, BILITOT, ALKPHOS in the last 72 hours. No results for input(s): INR in the last 72 hours. No results for input(s): Kaleen Hope in the last 72 hours.     Microbiology:    Blood culture #1:   Lab Results   Component Value Date    BC No growth-preliminary No growth  01/21/2022       Blood report electronically signed by Dr Gabriela Riley on 4/18/2022 4:35 PM      XR FOOT LEFT (2 VIEWS)   Final Result   No acute finding. **This report has been created using voice recognition software. It may contain minor errors which are inherent in voice recognition technology. **      Final report electronically signed by Dr. Murphy Chase on 4/15/2022 4:18 PM      IR FLUORO GUIDED NEEDLE PLACEMENT   Final Result   Status post successful nontunneled dialysis catheter insertion. **This report has been created using voice recognition software. It may contain minor errors which are inherent in voice recognition technology. **      Final report electronically signed by Dr Alessia Rodriguez on 4/15/2022 12:06 PM      XR CHEST PORTABLE   Final Result   1. Left lower lung atelectasis/infiltrate. 2. Mild stable cardiomegaly. **This report has been created using voice recognition software. It may contain minor errors which are inherent in voice recognition technology. **      Final report electronically signed by Dr. Roe Chapman on 4/14/2022 11:54 AM      FL MODIFIED BARIUM SWALLOW W VIDEO   Final Result   Laryngeal penetration and aspiration with thin consistency. Laryngeal penetration with nectar thick. Recommendations available from speech therapy            **This report has been created using voice recognition software. It may contain minor errors which are inherent in voice recognition technology. **      Final report electronically signed by Dr. Murphy Chase on 4/14/2022 12:39 PM      IR GUIDED NEPHROSTOGRAM/URETEROGRAM EXISTING ACCESS   Final Result   The tip of the nephrostomy tube is coiled in the left renal collecting system. **This report has been created using voice recognition software. It may contain minor errors which are inherent in voice recognition technology. **         Final report electronically signed by Dr Alessia Rodriguez on 4/14/2022 9:08 AM CT ABDOMEN PELVIS WO CONTRAST Additional Contrast? None   Final Result   1. Small bilateral pleural effusions with adjacent atelectasis/infiltrate. 2. Left-sided perinephric stranding and fluid collection adjacent to a left-sided nephrostomy tube, likely a subcapsular hematoma. Stable calcifications in the left kidney. 3. Cholelithiasis. 4. Sacral decubitus ulcer. Final report electronically signed by Dr. Milton Rivas on 4/12/2022 5:00 PM      US RENAL COMPLETE   Final Result   1. Significantly limited exam.   2. Increased renal cortical echogenicity bilaterally, compatible with    medical renal disease. 3. A left-sided nephrostomy tube is partially visualized. 4. Multiple left-sided renal calculi. 5. Mild left-sided caliectasis. The left renal pelvis is nondilated. 6. Nonspecific perinephric or subcapsular fluid is noted adjacent to the    left kidney, measuring 5.3 x 2.1 x 1.4 cm. This document has been electronically signed by: Rosaline Reardon M.D. on    04/12/2022 02:30 AM        CT ABDOMEN PELVIS WO CONTRAST Additional Contrast? None    Result Date: 4/12/2022  PROCEDURE: CT ABDOMEN PELVIS WO CONTRAST CLINICAL INFORMATION: Left staghorn calculus TECHNIQUE: CT of the abdomen and pelvis was performed without use of intravenous contrast. Axial images as well as sagittal and coronal reconstructions were obtained. All CT scans at this facility use dose modulation, iterative reconstruction, and/or weight-based dosing when appropriate to reduce radiation dose to as low as reasonably achievable. COMPARISON: CT abdomen and pelvis 3/13/2022 FINDINGS: Lower thorax: There are small bilateral pleural effusions. There is adjacent lung consolidation are present. The heart is enlarged. Abdomen: Evaluation is limited due to absence of contrast. There is no free intraperitoneal air. A gastrostomy tube is in the stomach. A left mid abdominal ostomy is stable and contains loops of bowel.  There is no bowel obstruction. Calcifications are present in the lumen of the gallbladder. A nephrostomy tube is now present in the left kidney. Fluid adjacent to the tube and kidney has noncontrast mean Hounsfield units of 12 (image 34). Subcapsular fluid measures up to 2.2 cm in width (image 38). Perinephric stranding is again noted. Calcifications in the left kidney are stable. Left-sided hydronephrosis is not significantly changed. Hypoattenuating lesions in the kidneys may be cysts but are incompletely characterized on the current study. There  are calcified granulomas in the liver and spleen. Atherosclerotic calcifications are present in the abdominal aorta without evidence of aneurysm. There is no mesenteric or retroperitoneal lymphadenopathy. Degenerative changes are seen in the thoracolumbar spine without evidence of aggressive osseous lesions. Pelvis: There is a Padilla catheter in a nondistended urinary bladder, likely accounting for gas in the bladder. Platelets are present in the pelvis. There are calcifications in the uterus. There is no pelvic or inguinal lymphadenopathy. There is persistent subcutaneous tissue irregularity posterior to the sacrum. Degenerative changes are present in the pelvis without evidence of aggressive osseous lesions. 1. Small bilateral pleural effusions with adjacent atelectasis/infiltrate. 2. Left-sided perinephric stranding and fluid collection adjacent to a left-sided nephrostomy tube, likely a subcapsular hematoma. Stable calcifications in the left kidney. 3. Cholelithiasis. 4. Sacral decubitus ulcer. Final report electronically signed by Dr. Alma Rosa Louise on 4/12/2022 5:00 PM    US RENAL COMPLETE    Result Date: 4/12/2022  RENAL ULTRASOUND COMPARISON: 1/3/2022. FINDINGS: Examination is significantly limited due to patient positioning and immobility. Increased renal cortical echogenicity is noted bilaterally, compatible with medical renal disease.  Multiple shadowing calculi are noted within the left kidney. For example there is a 2.7 cm stone in the left kidney on image 41. Left-sided nephrostomy tube is partially visualized. Mild caliectasis is noted in the left kidney. Left renal pelvis is nondilated. Nonspecific perinephric or subcapsular fluid is noted adjacent to the left kidney, measuring 5.3 x 2.1 x 1.4 cm on image 49. Urinary bladder contains a Padilla catheter. 1. Significantly limited exam. 2. Increased renal cortical echogenicity bilaterally, compatible with medical renal disease. 3. A left-sided nephrostomy tube is partially visualized. 4. Multiple left-sided renal calculi. 5. Mild left-sided caliectasis. The left renal pelvis is nondilated. 6. Nonspecific perinephric or subcapsular fluid is noted adjacent to the left kidney, measuring 5.3 x 2.1 x 1.4 cm.  This document has been electronically signed by: Margie Roper M.D. on 04/12/2022 02:30 AM      Electronically signed by Alesia Morales DO on 4/20/2022 at 12:21 PM

## 2022-04-20 NOTE — PROGRESS NOTES
[facility]         Renal Inpatient Follow-Up note         4/20/2022 9:13 AM    Pt Name:   Alba Nix  YOB: 1950  Attending:   Prashant Zheng DO    Chief Complaint : Alba Nix is a 73 y.o. female being followed by nephrology for ALVIN    Interval History :   Patient seen and examined at bedside. No acute distress or complaints. No overnight events. Feels good. No CP or SOB. Urine output is increasing. Mental status is good, alert & oriented. Receiving tube feeds. Remains on 1L per NC      Scheduled Medications :    metoprolol tartrate  25 mg Oral BID    ciprofloxacin  400 mg IntraVENous Q24H    guaiFENesin  600 mg Oral BID    amoxicillin  250 mg Oral Q24H    sodium hypochlorite   Irrigation Daily    silver nitrate applicators  1 each Topical Once    miconazole   Topical BID    [Held by provider] aspirin  81 mg Oral Daily    bumetanide  0.5 mg Oral Daily    famotidine  20 mg PEG Tube Every Other Day    ferrous sulfate  325 mg Oral Every Other Day    FLUoxetine  40 mg Oral Daily    sodium chloride flush  5-40 mL IntraVENous 2 times per day      dextrose      sodium chloride Stopped (04/16/22 0220)    sodium chloride         Vitals :  BP (!) 154/70   Pulse 71   Temp 98.2 °F (36.8 °C) (Oral)   Resp 15   Ht 4' 9\" (1.448 m)   Wt 180 lb 12.4 oz (82 kg)   SpO2 98%   BMI 39.12 kg/m²     24HR INTAKE/OUTPUT:      Intake/Output Summary (Last 24 hours) at 4/20/2022 0913  Last data filed at 4/20/2022 0847  Gross per 24 hour   Intake 148 ml   Output 1140 ml   Net -992 ml     Last 3 weights  Wt Readings from Last 3 Encounters:   04/20/22 180 lb 12.4 oz (82 kg)   03/29/22 134 lb (60.8 kg)   03/13/22 133 lb (60.3 kg)           Physical Exam :  General -- well developed and well nourished, chronically ill appearing. HEENT-normal external appearance of both ears and nose.   Mouth/throat  - oropharynx appears to be clear and moist  Eyes-conjunctiva normal no icterus or pallor. Neck-normal range of motion supple no JVD. Lungs -- crackles in lung bases   Heart -- S1, S2 heard, JVD - no  Abdomen - soft, non-tender  Extremities -- edema -- 2+ pitting edema noted  CNS - awake and alert  Psychiatric-mood and memory appears normal         Last 3 CBC   Recent Labs     04/18/22  1032 04/19/22  0614 04/20/22  0423   WBC 12.3* 13.1* 12.3*   RBC 2.49* 2.52* 2.44*   HGB 7.1* 7.2* 7.1*   HCT 24.9* 25.1* 24.2*    164 180     Last 3 CMP  Recent Labs     04/18/22  0522 04/19/22  0614 04/20/22  0423    136 132*   K 4.1 4.1 4.0   CL 99 101 98   CO2 23 23 23   BUN 54* 62* 66*   CREATININE 3.2* 3.7* 3.6*   CALCIUM 8.5 8.2* 8.1*             ASSESSMENT / Plan   1. Renal -acute kidney injury on chronic kidney disease stage III due to ATN intradialytic creatinine rising   ? Renal ultrasound scan does not show any acute abnormalities. ? Started on renal replacement therapy, had 2 treatments so far  ? Urine output is picking up. Will give one-time dose IV Bumex 0.5mg today and repeat labs in the AM, if renal function improves we will continue diuretic. If renal function worsens she will likely need dialysis. ? No acute need for dialysis today, if creatinine continues to rise tomorrow she will need HD tomorrow. May need hemodialysis outpatient as well. ? Will hold off on tunneled dialysis catheter placement until tomorrow when evaluated if she needs further HD sessions. Continue to hold anticoagulation and antiplatelets. ? Continue IV fluids  ? Monitor BMP  2. Mild hyponatremia due to IVF - discontinue 0.9NS   3. Primary HTN -Continue Lopressor 25 mg twice daily  4. Mild acidosis secondary to renal dysfunction-improved with dialysis  5. Anemia probably due to bleeding through the nephrostomy tube. Status post blood transfusion. 6. Bleeding from the nephrostomy tube urology has been consulted status post nephrostogram appears to be in good position  7.  Hx of chronic diastolic congestive heart failure appears to be getting edematous   8. Meds reviewed and discussed with patient and nursing staff      Dr. Shiela Montoya DO   Case discussed with Dr. [facility].

## 2022-04-20 NOTE — CARE COORDINATION
4/20/22, 10:05 AM EDT    DISCHARGE PLANNING EVALUATION    DOT spoke with Hector Mims with [id number], provided update on plan for tunneled cath and new hemodialysis.

## 2022-04-20 NOTE — CARE COORDINATION
4/20/22, 2:19 PM EDT    DISCHARGE ON GOING EVALUATION    Duarte Wilson day: 8  Location: [facility]18/018-A Reason for admit: ALVIN (acute kidney injury) ([facility] [address] [N17.9]  Acute renal failure superimposed on chronic kidney disease, unspecified CKD stage, unspecified acute renal failure type ([facility] [address] [N17.9, N18.9]   Procedure:    4/15 temp HD cath  Barriers to Discharge: Plan to monitor creatinine and decide tomorrow if tunneled cath and OP HD is needed. PCP: Christos Cedillo MD  Readmission Risk Score: 26.2 ( )%  Patient Goals/Plan/Treatment Preferences: Plans to return to the [place] [id number].

## 2022-04-20 NOTE — PLAN OF CARE
Problem: Falls - Risk of:  Goal: Will remain free from falls  Description: Will remain free from falls  4/20/2022 1429 by Aparna Rodriguez RN  Outcome: Progressing  Note: Call light in reach, fall band on, bed alarm on. Non-skid socks on    4/20/2022 0314 by Dylan Burton RN  Outcome: Ongoing  Note: No falls noted this shift. Continue falling star program. Bed alarm on, bed in low position. Call light and personal belongings in reach. Patient uses call light appropriately. Goal: Absence of physical injury  Description: Absence of physical injury  4/20/2022 1429 by Aparna Rodriguez RN  Outcome: Progressing  4/20/2022 0314 by Dylan Burton RN  Outcome: Ongoing     Problem: Skin Integrity:  Goal: Will show no infection signs and symptoms  Description: Will show no infection signs and symptoms  4/20/2022 1429 by Aparna Rodriguez RN  Outcome: Progressing  4/20/2022 0314 by Dylan Burton RN  Outcome: Ongoing  Goal: Absence of new skin breakdown  Description: Absence of new skin breakdown  4/20/2022 1429 by Aparna Rodriguez RN  Outcome: Progressing  Note: No signs of new skin breakdown noted with each assessment this shift. Skin warm, dry, and intact except where otherwise noted in head-to-toe assessment. Mucous membranes pink and moist. Assistance with turns/ambulation given as needed. 4/20/2022 0314 by Dylan Burton RN  Outcome: Ongoing  Note: No skin break down noted at this time. Encouraged patient to reposition self in bed.        Problem: Confusion - Acute:  Goal: Absence of continued neurological deterioration signs and symptoms  Description: Absence of continued neurological deterioration signs and symptoms  4/20/2022 1429 by Aparna Rodriguez RN  Outcome: Progressing  4/20/2022 0314 by Dylan Burton RN  Outcome: Ongoing  Goal: Mental status will be restored to baseline  Description: Mental status will be restored to baseline  4/20/2022 1429 by Aparna Rodriguez RN  Outcome: Progressing  4/20/2022 0314 by Gurjit Grove RN  Outcome: Ongoing     Problem: Discharge Planning:  Goal: Ability to perform activities of daily living will improve  Description: Ability to perform activities of daily living will improve  4/20/2022 1429 by Yvonne Alexander RN  Outcome: Progressing  Note: Denies needs at this time. 4/20/2022 0314 by Gurjit Grove RN  Outcome: Ongoing  Goal: Participates in care planning  Description: Participates in care planning  4/20/2022 1429 by Yvonne Alexander RN  Outcome: Progressing  4/20/2022 0314 by Gurjit Grove RN  Outcome: Ongoing  Note: Patient plans to return to the [place] [id number] when medically stable. Problem: Injury - Risk of, Physical Injury:  Goal: Will remain free from falls  Description: Will remain free from falls  4/20/2022 1429 by Yvonne Alexander RN  Outcome: Progressing  Note: Call light in reach, fall band on, bed alarm on. Non-skid socks on    4/20/2022 0314 by Gurjit Grove RN  Outcome: Ongoing  Note: No falls noted this shift. Continue falling star program. Bed alarm on, bed in low position. Call light and personal belongings in reach. Patient uses call light appropriately.     Goal: Absence of physical injury  Description: Absence of physical injury  4/20/2022 1429 by Yvonne Alexander RN  Outcome: Progressing  4/20/2022 0314 by Gurjit Grove RN  Outcome: Ongoing     Problem: Mood - Altered:  Goal: Verbalizations of feeling emotionally comfortable while being cared for will increase  Description: Verbalizations of feeling emotionally comfortable while being cared for will increase  4/20/2022 1429 by Yvonne Alexander RN  Outcome: Progressing  4/20/2022 0314 by Gurjit Grove RN  Outcome: Ongoing     Problem: Psychomotor Activity - Altered:  Goal: Absence of psychomotor disturbance signs and symptoms  Description: Absence of psychomotor disturbance signs and symptoms  4/20/2022 1429 by Yvonne Alexander RN  Outcome: Progressing  4/20/2022 0314 by Gurjit Grove RN  Outcome: Ongoing     Problem: Sensory Perception - Impaired:  Goal: Demonstrations of improved sensory functioning will increase  Description: Demonstrations of improved sensory functioning will increase  4/20/2022 1429 by Kody Veras RN  Outcome: Progressing  4/20/2022 0314 by Suzanne Lyles RN  Outcome: Ongoing  Goal: Decrease in sensory misperception frequency  Description: Decrease in sensory misperception frequency  4/20/2022 1429 by Kody Veras RN  Outcome: Progressing  4/20/2022 0314 by Suzanne Lyles RN  Outcome: Ongoing  Goal: Able to refrain from responding to false sensory perceptions  Description: Able to refrain from responding to false sensory perceptions  4/20/2022 1429 by Kody Veras RN  Outcome: Progressing  4/20/2022 0314 by Suzanne Lyles RN  Outcome: Ongoing  Goal: Demonstrates accurate environmental perceptions  Description: Demonstrates accurate environmental perceptions  4/20/2022 1429 by Kody Veras RN  Outcome: Progressing  4/20/2022 0314 by Suzanne Lyles RN  Outcome: Ongoing  Goal: Able to distinguish between reality-based and nonreality-based thinking  Description: Able to distinguish between reality-based and nonreality-based thinking  4/20/2022 1429 by Kody Veras RN  Outcome: Progressing  4/20/2022 0314 by Suzanne Lyles RN  Outcome: Ongoing  Goal: Able to interrupt nonreality-based thinking  Description: Able to interrupt nonreality-based thinking  4/20/2022 1429 by Kody Veras RN  Outcome: Progressing  4/20/2022 0314 by Suzanne Lyles RN  Outcome: Ongoing     Problem: Sleep Pattern Disturbance:  Goal: Appears well-rested  Description: Appears well-rested  4/20/2022 1429 by Kody Veras RN  Outcome: Progressing  4/20/2022 0314 by Suzanne Lyles RN  Outcome: Ongoing  Note: Patient slept throughout entire shift. Will continue to monitor.       Problem: Nutrition  Goal: Optimal nutrition therapy  4/20/2022 1429 by Kody Veras RN  Outcome: Progressing  Note: The patient is asked to make an attempt to improve diet and exercise patterns to aid in medical management of this problem. 4/20/2022 0314 by Flaco Engel RN  Outcome: Ongoing  Note: Patient on dysphagia diet with honey thickened liquids. Also receiving tube feeds overnight. Will continue to monitor. Problem: DISCHARGE BARRIERS  Goal: Patient's continuum of care needs are met  4/20/2022 1429 by Charlee Banegas RN  Outcome: Progressing  Note: Monitoring discharge needs. Patient discharge  when medically stable. SW following    4/20/2022 0314 by Flaco Engel RN  Outcome: Ongoing     Problem: Pain:  Goal: Pain level will decrease  Description: Pain level will decrease  4/20/2022 1429 by Charlee Banegas RN  Outcome: Progressing  Note: Patient denied pain with each assessment this shift. Will continue to monitor and provide pain relief measures as needed. 4/20/2022 0314 by Flaco Engel RN  Outcome: Ongoing  Note: No complaint of pain voiced at this time. Continue to monitor. PRN medications available if needed. Goal: Control of acute pain  Description: Control of acute pain  4/20/2022 1429 by Charlee Banegas RN  Outcome: Progressing  4/20/2022 0314 by Flaco Engel RN  Outcome: Ongoing  Goal: Control of chronic pain  Description: Control of chronic pain  4/20/2022 1429 by Charlee Banegas RN  Outcome: Progressing  4/20/2022 0314 by Flaco Engel RN  Outcome: Ongoing     Problem: Discharge Planning  Goal: Discharge to home or other facility with appropriate resources  Outcome: Progressing  Note: The current medical regimen is effective;  continue present plan and medications. Problem: Chronic Conditions and Co-morbidities  Goal: Patient's chronic conditions and co-morbidity symptoms are monitored and maintained or improved  Outcome: Progressing    Care plan reviewed with patient and family. Patient and family   verbalize understanding of the plan of care and contribute to goal setting.

## 2022-04-20 NOTE — PLAN OF CARE
stable  Outcome: Ongoing  Goal: Absence of abusive behavior  Description: Absence of abusive behavior  Outcome: Ongoing  Goal: Verbalizations of feeling emotionally comfortable while being cared for will increase  Description: Verbalizations of feeling emotionally comfortable while being cared for will increase  Outcome: Ongoing     Problem: Psychomotor Activity - Altered:  Goal: Absence of psychomotor disturbance signs and symptoms  Description: Absence of psychomotor disturbance signs and symptoms  Outcome: Ongoing     Problem: Sensory Perception - Impaired:  Goal: Demonstrations of improved sensory functioning will increase  Description: Demonstrations of improved sensory functioning will increase  Outcome: Ongoing  Goal: Decrease in sensory misperception frequency  Description: Decrease in sensory misperception frequency  Outcome: Ongoing  Goal: Able to refrain from responding to false sensory perceptions  Description: Able to refrain from responding to false sensory perceptions  Outcome: Ongoing  Goal: Demonstrates accurate environmental perceptions  Description: Demonstrates accurate environmental perceptions  Outcome: Ongoing  Goal: Able to distinguish between reality-based and nonreality-based thinking  Description: Able to distinguish between reality-based and nonreality-based thinking  Outcome: Ongoing  Goal: Able to interrupt nonreality-based thinking  Description: Able to interrupt nonreality-based thinking  Outcome: Ongoing     Problem: Sleep Pattern Disturbance:  Goal: Appears well-rested  Description: Appears well-rested  Outcome: Ongoing  Note: Patient slept throughout entire shift. Will continue to monitor. Problem: Nutrition  Goal: Optimal nutrition therapy  Outcome: Ongoing  Note: Patient on dysphagia diet with honey thickened liquids. Also receiving tube feeds overnight. Will continue to monitor.       Problem: DISCHARGE BARRIERS  Goal: Patient's continuum of care needs are met  Outcome:

## 2022-04-20 NOTE — PROGRESS NOTES
[address] [address]  INPATIENT SPEECH THERAPY  [facility] RENAL TELEMET[facility] 6K  DAILY NOTE    TIME   SLP Individual Minutes  Time In: 8889  Time Out: 5004  Minutes: 18  Timed Code Treatment Minutes: 0 Minutes       Date: 2022  Patient Name: Janine Harding      CSN: [id number]   : 1950  (73 y.o.)  Gender: female   Referring Physician: Sadie Young PA-C   Diagnosis: ALVIN  Precautions: Aspiration risks/precautions, fall risk   Current Diet: Soft/bite sized diet and moderately thick liquids   Swallowing Strategies: Full Upright Position, Small Bite/Sip, No Straw, Pulmonary Monitoring, Medication in Applesauce, Alternate Solids and Liquids, Limit Distractions and Monitor for Fatigue, NO ICE   Date of Last MBS/FEES: MBS on     Pain:  No pain reported. Subjective:  NOAM Shah approved completion of ST session. Patient alert, pleasant, and agreeable for dysphagia treatment. Patient with family present and actively involved in [address] [place] [address] Dr session throughout. Short-Term Goals:  SHORT TERM GOAL #1:  Goal 1: Patient will consume a soft/bite sized diet and moderately thick liquids without overt s/s of airway invasion to meet nutritional/hydration measures safely. INTERVENTIONS: Patient agreeable to complete LIMITED PO trials of moderately thick liquids via cup this date with refusal to complete PO trials of puree, soft solids, and hard/coarse solids this date. Patient with good labial seal, adequate bolus control, and suspected timely swallow initiation. NO overt s/s of aspiration evidenced this date, however, certainly cannot r/o airway invasion events at bedside alone. ST with direct instruction for compensatory swallowing strategies (via green swallow guide) and education re: thickened liquids with family with verbal receptiveness noted.     Recommend continuation of soft and bite sized diet with moderately thick liquids with implementation of compensatory swallowing strategies and completion of FEES  to determine patient readiness for liquid advancement. SHORT TERM GOAL #2:  Goal 2: Patient will consume advanced PO solids and thin liquids with use of bolus hold maneuver with ST only without overt s/s of airway invasion to progress solids and determine appropriateness for repeat instrumental assessment. INTERVENTIONS: DNT due to focus on additional STGs. SHORT TERM GOAL #3:  Goal 3: Patient will complete pharyngeal strengthening exercises (i.e. effortful, grisel) x10 for improved pharyngeal shortening for enhanced airway protection. INTERVENTIONS: Patient completed the following pharyngeal strenghtening exercises to improve pharyngeal constriction, tongue base retraction, and overall airway protection. Skilled instructed provided on proper technique and rationale for completion. Pharyngeal Exercises  Effortful Swallow: 1 set of x10 with fair success given utilization of small sip for swallow initiation  Grisel: unable to elicit despite maximum visual/verbal cuing  CTAR: 1 set of x10 with good success given ST provision of resistance    SHORT TERM GOAL #4:  Goal 4: Complete repeat MBS in 1-2 weeks to further assess and determine ability to advance liquid intake pending on level of pharyngeal dysfunction still present. INTERVENTIONS: Recommendations to complete FEES 4/21 in order to determine patient readiness for liquid advancement. SHORT TERM GOAL #5:  Goal 5: Monitor cognitive functioning and complete further assessment as deemed clinically indicated. INTERVENTIONS: DNT due to focus on additional STGs.       Long-Term Goals:    No established LTG's given short ELOS          EDUCATION:  Learner: Patient, Family  Education:  Reviewed diet and strategies, Reviewed signs, symptoms and risks of aspiration, Demonstrated how to thicken liquids appropriately, Reviewed ST goals and Plan of Care, Reviewed recommendations for follow-up, Education Related to Potential Risks and Complications Due to Impairment/Illness/Injury and Education Related to Prevention of Recurrence of Impairment/Illness/Injury  Evaluation of Education: Verbalizes understanding, Demonstrates with assistance and Needs further instruction    ASSESSMENT/PLAN:  Activity Tolerance:  Patient tolerance of  treatment: good. Assessment/Plan: Patient progressing toward established goals. Continues to require skilled care of licensed speech pathologist to progress toward achievement of established goals and plan of care.      Plan for Next Session: Thierno Wilson M.S., [facility]

## 2022-04-21 ENCOUNTER — APPOINTMENT (OUTPATIENT)
Dept: GENERAL RADIOLOGY | Age: 72
DRG: 673 | End: 2022-04-21
Payer: MEDICARE

## 2022-04-21 LAB
ANION GAP SERPL CALCULATED.3IONS-SCNC: 12 MEQ/L (ref 8–16)
BASOPHILS # BLD: 0.3 %
BASOPHILS ABSOLUTE: 0.1 THOU/MM3 (ref 0–0.1)
BUN BLDV-MCNC: 70 MG/DL (ref 7–22)
CALCIUM SERPL-MCNC: 8.1 MG/DL (ref 8.5–10.5)
CHLORIDE BLD-SCNC: 97 MEQ/L (ref 98–111)
CO2: 24 MEQ/L (ref 23–33)
CREAT SERPL-MCNC: 4 MG/DL (ref 0.4–1.2)
EOSINOPHIL # BLD: 2.2 %
EOSINOPHILS ABSOLUTE: 0.4 THOU/MM3 (ref 0–0.4)
ERYTHROCYTE [DISTWIDTH] IN BLOOD BY AUTOMATED COUNT: 15.4 % (ref 11.5–14.5)
ERYTHROCYTE [DISTWIDTH] IN BLOOD BY AUTOMATED COUNT: 54.1 FL (ref 35–45)
GFR SERPL CREATININE-BSD FRML MDRD: 11 ML/MIN/1.73M2
GLUCOSE BLD-MCNC: 134 MG/DL (ref 70–108)
GLUCOSE BLD-MCNC: 88 MG/DL (ref 70–108)
GLUCOSE BLD-MCNC: 95 MG/DL (ref 70–108)
HCT VFR BLD CALC: 25.4 % (ref 37–47)
HEMOGLOBIN: 7.5 GM/DL (ref 12–16)
IMMATURE GRANS (ABS): 0.09 THOU/MM3 (ref 0–0.07)
IMMATURE GRANULOCYTES: 0.5 %
LYMPHOCYTES # BLD: 3 %
LYMPHOCYTES ABSOLUTE: 0.5 THOU/MM3 (ref 1–4.8)
MCH RBC QN AUTO: 28.8 PG (ref 26–33)
MCHC RBC AUTO-ENTMCNC: 29.5 GM/DL (ref 32.2–35.5)
MCV RBC AUTO: 97.7 FL (ref 81–99)
MONOCYTES # BLD: 6.4 %
MONOCYTES ABSOLUTE: 1.2 THOU/MM3 (ref 0.4–1.3)
NUCLEATED RED BLOOD CELLS: 0 /100 WBC
PLATELET # BLD: 181 THOU/MM3 (ref 130–400)
PMV BLD AUTO: 9.1 FL (ref 9.4–12.4)
POTASSIUM SERPL-SCNC: 4.3 MEQ/L (ref 3.5–5.2)
RBC # BLD: 2.6 MILL/MM3 (ref 4.2–5.4)
SEG NEUTROPHILS: 87.6 %
SEGMENTED NEUTROPHILS ABSOLUTE COUNT: 15.9 THOU/MM3 (ref 1.8–7.7)
SODIUM BLD-SCNC: 133 MEQ/L (ref 135–145)
WBC # BLD: 18.2 THOU/MM3 (ref 4.8–10.8)

## 2022-04-21 PROCEDURE — 71045 X-RAY EXAM CHEST 1 VIEW: CPT

## 2022-04-21 PROCEDURE — 6370000000 HC RX 637 (ALT 250 FOR IP): Performed by: STUDENT IN AN ORGANIZED HEALTH CARE EDUCATION/TRAINING PROGRAM

## 2022-04-21 PROCEDURE — 82948 REAGENT STRIP/BLOOD GLUCOSE: CPT

## 2022-04-21 PROCEDURE — 99233 SBSQ HOSP IP/OBS HIGH 50: CPT | Performed by: INTERNAL MEDICINE

## 2022-04-21 PROCEDURE — 80048 BASIC METABOLIC PNL TOTAL CA: CPT

## 2022-04-21 PROCEDURE — 2580000003 HC RX 258: Performed by: STUDENT IN AN ORGANIZED HEALTH CARE EDUCATION/TRAINING PROGRAM

## 2022-04-21 PROCEDURE — 90935 HEMODIALYSIS ONE EVALUATION: CPT

## 2022-04-21 PROCEDURE — 80074 ACUTE HEPATITIS PANEL: CPT

## 2022-04-21 PROCEDURE — 1200000000 HC SEMI PRIVATE

## 2022-04-21 PROCEDURE — 6360000002 HC RX W HCPCS: Performed by: PHARMACIST

## 2022-04-21 PROCEDURE — 36415 COLL VENOUS BLD VENIPUNCTURE: CPT

## 2022-04-21 PROCEDURE — 6370000000 HC RX 637 (ALT 250 FOR IP): Performed by: PHYSICIAN ASSISTANT

## 2022-04-21 PROCEDURE — 85025 COMPLETE CBC W/AUTO DIFF WBC: CPT

## 2022-04-21 RX ADMIN — METOPROLOL TARTRATE 25 MG: 25 TABLET, FILM COATED ORAL at 08:48

## 2022-04-21 RX ADMIN — MICONAZOLE NITRATE: 20 POWDER TOPICAL at 20:32

## 2022-04-21 RX ADMIN — AMOXICILLIN 250 MG: 250 CAPSULE ORAL at 14:42

## 2022-04-21 RX ADMIN — MICONAZOLE NITRATE: 20 POWDER TOPICAL at 08:48

## 2022-04-21 RX ADMIN — DAKIN'S SOLUTION 0.125% (QUARTER STRENGTH): 0.12 SOLUTION at 08:48

## 2022-04-21 RX ADMIN — METOPROLOL TARTRATE 25 MG: 25 TABLET, FILM COATED ORAL at 20:32

## 2022-04-21 RX ADMIN — FLUOXETINE 40 MG: 20 CAPSULE ORAL at 08:48

## 2022-04-21 RX ADMIN — SODIUM CHLORIDE, PRESERVATIVE FREE 10 ML: 5 INJECTION INTRAVENOUS at 20:31

## 2022-04-21 RX ADMIN — GUAIFENESIN 600 MG: 600 TABLET, EXTENDED RELEASE ORAL at 20:32

## 2022-04-21 RX ADMIN — GUAIFENESIN 600 MG: 600 TABLET, EXTENDED RELEASE ORAL at 08:47

## 2022-04-21 RX ADMIN — CIPROFLOXACIN 400 MG: 2 INJECTION, SOLUTION INTRAVENOUS at 22:37

## 2022-04-21 RX ADMIN — SODIUM CHLORIDE, PRESERVATIVE FREE 10 ML: 5 INJECTION INTRAVENOUS at 08:47

## 2022-04-21 RX ADMIN — FAMOTIDINE 20 MG: 20 TABLET ORAL at 08:47

## 2022-04-21 ASSESSMENT — PAIN SCALES - GENERAL
PAINLEVEL_OUTOF10: 0

## 2022-04-21 NOTE — PROGRESS NOTES
[address] [address]  SPEECH THERAPY MISSED TREATMENT NOTE  [facility] RENAL TELEMET[facility] 6K      Date: 2022  Patient Name: Janine Harding        MRN: [id number]    : 1950  (73 y.o.)    REASON FOR MISSED TREATMENT:  ST orders for completion of FEES to further assess pharyngeal swallow function and determine appropriateness for further liquid advancement. Spoke with RN, Elizabeth aGllegos who reports patient vomiting following consumption of medications with THIN water. Skilled level education re: importance for use of thickened liquids as patient with aspiration present on most recent MBS completed on . Patient currently [facility] at inpatient dialysis. Will check back later this date and/or tomorrow, .      Carlos Alberto Brownlee M.S. Herlinda Barahona 2022

## 2022-04-21 NOTE — PLAN OF CARE
Problem: Falls - Risk of:  Goal: Will remain free from falls  Description: Will remain free from falls  4/21/2022 0038 by Valerio Lopez RN  Outcome: Progressing  Note: No falls noted this shift. Continue falling star program. Bed alarm on, bed in low position. Call light and personal belongings in reach. Patient uses call light appropriately. Problem: Falls - Risk of:  Goal: Absence of physical injury  Description: Absence of physical injury  4/21/2022 0038 by Valerio Lopez RN  Outcome: Progressing     Problem: Skin Integrity:  Goal: Will show no infection signs and symptoms  Description: Will show no infection signs and symptoms  4/21/2022 0038 by Valerio Lopez RN  Outcome: Progressing     Problem: Skin Integrity:  Goal: Absence of new skin breakdown  Description: Absence of new skin breakdown  4/21/2022 0038 by Valerio Lopez RN  Outcome: Progressing  Note: No new skin break down noted at this time. Encouraged patient to reposition self in bed. Will continue to monitor. Problem: Confusion - Acute:  Goal: Absence of continued neurological deterioration signs and symptoms  Description: Absence of continued neurological deterioration signs and symptoms  4/21/2022 0038 by Valerio Lopez RN  Outcome: Progressing     Problem: Confusion - Acute:  Goal: Mental status will be restored to baseline  Description: Mental status will be restored to baseline  4/21/2022 0038 by Valerio Lopez RN  Outcome: Progressing  Note: Patient oriented to baseline. Will continue to monitor.       Problem: Discharge Planning:  Goal: Ability to perform activities of daily living will improve  Description: Ability to perform activities of daily living will improve  4/21/2022 0038 by Valerio Lopez RN  Outcome: Progressing     Problem: Discharge Planning:  Goal: Participates in care planning  Description: Participates in care planning  4/21/2022 0038 by Valerio Lopez RN  Outcome: Progressing  Note: Patient plans to return to the [facility] [address] when medically stable. Problem: Injury - Risk of, Physical Injury:  Goal: Will remain free from falls  Description: Will remain free from falls  4/21/2022 0038 by Zack Waterman RN  Outcome: Progressing  Note: No falls noted this shift. Continue falling star program. Bed alarm on, bed in low position. Call light and personal belongings in reach. Patient uses call light appropriately. Problem: Injury - Risk of, Physical Injury:  Goal: Absence of physical injury  Description: Absence of physical injury  4/21/2022 0038 by Zack Waterman RN  Outcome: Progressing     Problem: Mood - Altered:  Goal: Verbalizations of feeling emotionally comfortable while being cared for will increase  Description: Verbalizations of feeling emotionally comfortable while being cared for will increase  4/21/2022 0038 by Zack Waterman RN  Outcome: Progressing  Note: Patient has no complaints at this time. States all needs are being met. Will continue to monitor.       Problem: Psychomotor Activity - Altered:  Goal: Absence of psychomotor disturbance signs and symptoms  Description: Absence of psychomotor disturbance signs and symptoms  4/21/2022 0038 by Zack Waterman RN  Outcome: Progressing     Problem: Sensory Perception - Impaired:  Goal: Demonstrations of improved sensory functioning will increase  Description: Demonstrations of improved sensory functioning will increase  4/21/2022 0038 by Zack Waterman RN  Outcome: Progressing     Problem: Sensory Perception - Impaired:  Goal: Decrease in sensory misperception frequency  Description: Decrease in sensory misperception frequency  4/21/2022 0038 by Zack Waterman RN  Outcome: Progressing     Problem: Sensory Perception - Impaired:  Goal: Able to refrain from responding to false sensory perceptions  Description: Able to refrain from responding to false sensory perceptions  4/21/2022 0038 by Zack Waterman RN  Outcome: Progressing     Problem: Sensory Perception - Impaired:  Goal: Demonstrates accurate environmental perceptions  Description: Demonstrates accurate environmental perceptions  4/21/2022 0038 by Elizabeth Man RN  Outcome: Progressing     Problem: Sensory Perception - Impaired:  Goal: Able to distinguish between reality-based and nonreality-based thinking  Description: Able to distinguish between reality-based and nonreality-based thinking  4/21/2022 0038 by Elizabeth Man RN  Outcome: Progressing     Problem: Sensory Perception - Impaired:  Goal: Able to interrupt nonreality-based thinking  Description: Able to interrupt nonreality-based thinking  4/21/2022 0038 by Elizabeth Man RN  Outcome: Progressing     Problem: Sleep Pattern Disturbance:  Goal: Appears well-rested  Description: Appears well-rested  4/21/2022 0038 by Elizabeth Man RN  Outcome: Progressing     Problem: Nutrition  Goal: Optimal nutrition therapy  4/21/2022 0038 by Elizabeth Man RN  Outcome: Progressing     Problem: DISCHARGE BARRIERS  Goal: Patient's continuum of care needs are met  4/21/2022 0038 by Elizabeth Man RN  Outcome: Progressing     Problem: Pain:  Goal: Pain level will decrease  Description: Pain level will decrease  4/21/2022 0038 by Elizabeth Man RN  Outcome: Progressing  Note: No complaint of pain voiced at this time. Continue to monitor. PRN medications available if needed.        Problem: Pain:  Goal: Control of acute pain  Description: Control of acute pain  4/21/2022 0038 by Elizabeth Man RN  Outcome: Progressing     Problem: Pain:  Goal: Control of chronic pain  Description: Control of chronic pain  4/21/2022 0038 by Elizabeth Man RN  Outcome: Progressing     Problem: Discharge Planning  Goal: Discharge to home or other facility with appropriate resources  4/21/2022 0038 by Elizabeth Man RN  Outcome: Progressing     Problem: Chronic Conditions and Co-morbidities  Goal: Patient's chronic conditions and co-morbidity symptoms are monitored and maintained or improved  4/21/2022 200 by Josephus Osmar, RN  Outcome: Progressing     Problem: ABCDS Injury Assessment  Goal: Absence of physical injury  Outcome: Progressing     Care plan reviewed with patient. Patient verbalizes understanding of plan of care and contributes to goal setting.

## 2022-04-21 NOTE — PROGRESS NOTES
[facility]         Renal Inpatient Follow-Up note         4/21/2022 8:58 AM    Pt Name:   Sonido Cherry  YOB: 1950  Attending:   Sandra Nicole DO    Chief Complaint : Sonido Cherry is a 73 y.o. female being followed by nephrology for ALVIN    Interval History :   Patient seen and examined in dialysis. No acute distress or complaints. No overnight events. Feels good. No CP or SOB. Making urine. Mental status is good, alert & oriented. Receiving tube feeds. Remains on 1L per NC      Scheduled Medications :    metoprolol tartrate  25 mg Oral BID    ciprofloxacin  400 mg IntraVENous Q24H    guaiFENesin  600 mg Oral BID    amoxicillin  250 mg Oral Q24H    sodium hypochlorite   Irrigation Daily    silver nitrate applicators  1 each Topical Once    miconazole   Topical BID    [Held by provider] aspirin  81 mg Oral Daily    [Held by provider] bumetanide  0.5 mg Oral Daily    famotidine  20 mg PEG Tube Every Other Day    ferrous sulfate  325 mg Oral Every Other Day    FLUoxetine  40 mg Oral Daily    sodium chloride flush  5-40 mL IntraVENous 2 times per day      dextrose      sodium chloride Stopped (04/16/22 0220)    sodium chloride         Vitals :  BP (!) 155/63   Pulse 79   Temp 98.4 °F (36.9 °C) (Oral)   Resp 18   Ht 4' 9\" (1.448 m)   Wt 178 lb 5.6 oz (80.9 kg)   SpO2 95%   BMI 38.59 kg/m²     24HR INTAKE/OUTPUT:      Intake/Output Summary (Last 24 hours) at 4/21/2022 0858  Last data filed at 4/21/2022 0836  Gross per 24 hour   Intake 30 ml   Output 1425 ml   Net -1395 ml     Last 3 weights  Wt Readings from Last 3 Encounters:   04/21/22 178 lb 5.6 oz (80.9 kg)   03/29/22 134 lb (60.8 kg)   03/13/22 133 lb (60.3 kg)           Physical Exam :  General -- well developed and well nourished, chronically ill appearing. HEENT-normal external appearance of both ears and nose.   Mouth/throat  - oropharynx appears to be clear and moist  Eyes-conjunctiva normal no icterus or pallor. Neck-normal range of motion supple no JVD. Lungs -- crackles in lung bases   Heart -- S1, S2 heard, JVD - no  Abdomen - soft, non-tender  Extremities -- edema -- 2+ pitting edema noted  CNS - awake and alert  Psychiatric-mood and memory appears normal         Last 3 CBC   Recent Labs     04/18/22  1032 04/19/22  0614 04/20/22  0423   WBC 12.3* 13.1* 12.3*   RBC 2.49* 2.52* 2.44*   HGB 7.1* 7.2* 7.1*   HCT 24.9* 25.1* 24.2*    164 180     Last 3 CMP  Recent Labs     04/19/22  0614 04/20/22  0423 04/21/22  0406    132* 133*   K 4.1 4.0 4.3    98 97*   CO2 23 23 24   BUN 62* 66* 70*   CREATININE 3.7* 3.6* 4.0*   CALCIUM 8.2* 8.1* 8.1*             ASSESSMENT / Plan   1. Renal -acute kidney injury on chronic kidney disease stage III due to ATN intradialytic creatinine rising   ? Renal ultrasound scan does not show any acute abnormalities. ? Started on renal replacement therapy, had 2 treatments so far. Plan for HD today and will need HD outpatient temporarily. ? Urine output seems to have plateaud. Received one-time dose IV Bumex 0.5mg on 4/20 which did not increase urine output or improve kidney function. ? Will receive a tunneled dialysis catheter. Continue to hold anticoagulation and antiplatelets. ? Monitor BMP  2. Mild hyponatremia due to IVF - improving since stopping IVF   3. Primary HTN -Continue Lopressor 25 mg twice daily  4. Mild acidosis secondary to renal dysfunction-improved with dialysis  5. Anemia probably due to bleeding through the nephrostomy tube. Status post blood transfusion. 6. Bleeding from the nephrostomy tube urology has been consulted status post nephrostogram appears to be in good position  7. Hx of chronic diastolic congestive heart failure appears to be getting edematous   8.  Meds reviewed and discussed with patient and nursing staff      Dr. Jani Deshpande DO   Case discussed with Dr. Haris Pace M.D.  [facility].

## 2022-04-21 NOTE — FLOWSHEET NOTE
04/21/22 0923 04/21/22 1405   Vital Signs   BP (!) 149/75 (!) 177/79   Temp 98.7 °F (37.1 °C) 99.1 °F (37.3 °C)   Pulse 92 68   Resp 22 20   SpO2 100 % 100 %   Weight 178 lb 5.6 oz (80.9 kg) 169 lb 8.5 oz (76.9 kg)   Weight Method Bed scale Bed scale   Percent Weight Change 0 -4.94   Post-Hemodialysis Assessment   Post-Treatment Procedures  --  Blood returned;Catheter Capped, clamped with Saline x2 ports   Machine Disinfection Process  --  Acid/Vinegar Clean;Heat Disinfect; Exterior Machine Disinfection   Total Liters Processed (l/min)  --  59 l/min   Dialyzer Clearance  --  Lightly streaked   Duration of Treatment (minutes)  --  240 minutes   Heparin amount administered during treatment (units)  --  0 units   Hemodialysis Intake (ml)  --  800 ml   Hemodialysis Output (ml)  --  3800 ml   NET Removed (ml)  --  3000 ml   Tolerated Treatment  --  Good   4 hour treatment completed. 3L of fluid removed. [place] through treatment patient attempted to clot off dialyzer. Treatment paused - blood returned with no blood loss. Treatment restarted with new set up. Patient tolerated fluid removal well. Cath Lines flushed with 10 ml of 0.9 NS, clamped and tego secured. Report given to primary floor nurse. Charting printed and placed in bin to be scanned into EMR.

## 2022-04-21 NOTE — CARE COORDINATION
4/21/22, 1:28 PM EDT    DISCHARGE ON GOING EVALUATION    Leonardo Rodriguez day: 9  Location: [facility]18/018-A Reason for admit: [facility] [N17.9]  Acute renal failure superimposed on chronic kidney disease, unspecified CKD stage, unspecified acute renal failure type ([facility] [N17.9, N18.9]   Procedure: 4/15 temp HD cath  Barriers to Discharge:  Needs tunneled HD cath placed and OP HD arranged. [facility] Perla has submitted HD referral.   PCP: Peyton Freire MD  Readmission Risk Score: 26.8 ( )%  Patient Goals/Plan/Treatment Preferences: Return to [place] [id number]. Await chair time for new HD at [address] [place] [address].

## 2022-04-21 NOTE — PROGRESS NOTES
Hospitalist Progress Note      Patient:  Maureen Arauz    Unit/Bed:6K-18/018-A  YOB: 1950  MRN: [id number]   Acct: [de-identified]   PCP: Ese Powell MD  Date of Admission: 4/11/2022    Assessment/Plan:     ALVIN on CKD stage III, improving: unclear etiology but some volume depletion contributing in additional to diuretic use per Nephro who has been consulted. Renal US without acute abnormalities. Aggressive IVFs. Avoid nephrotoxic agents / contrast. Renal US without acute abnormalities. S/p temporary dialysis catheter and RRT. 4/19: Nephro plans potential tunneled catheter tomorrow, no anticoagulation. Continue with IVF for now. Monitor labs in am for final decision, appreciate Nephro. Acute hypoxic respiratory failure: on 1 L NC with SpO2 96%. Encourage IS / Acapella. CXR shows LLL infiltrate vs atelectasis. Suspect atelectasis with pt's presentation. Wean O2 as tolerated. Hypomagnesemia, resolved: replaced      Hyperkalemia, resolved: Nephrology following. Check daily BMP. Dislodged L nephrostomy tube s/p nephrostogram, resolved: no indication of dislodgement on CT A/P, IR has been consulted for assessment. Previously dislodged and replaced multiple times, last being 3/14/22. L staghorn calculus / bleeding L nephrostomy tube: Urology has been consulted. Repeat CT A/P from 4/12 shows L sided perinephric stranding and fluid collection adjacent to tube which is felt to likely be subcapsular hematoma. Acute cystitis: chronic seaman catheter, UA shows few bacteria, moderate leukocyte esterase and 50-75 with clumps WBC. Urine culture returned growing nonfermenting gram-negative bacilli, enteric gram-negative bacilli, and gram-positive cocci. Pt denies any burning at the site of her catheter. Will discuss with Urology if treatment is indicated at this time. Afebrile, no leukocytosis.     4/18: urine gx returned growing Pseudomonas or [place], Klebsiella pneumoniae and Enterococcus faecalis group D. Continue Cipro and add PO amoxicillin per ID (complete 7 days)     Acute on chronic normocytic anemia: Hg 7.5 on admission with baseline Hg 8.0s-9.0s. Mild-moderate dysphagia: seen by SLP, underwent MBS. Recommended honey thick liquids, no straws, and soft and bite sized. Metabolic acidosis: on sodium bicarbonate per Nephro, continue to monitor. Will correct with HD. Resolved. Elevated troponin: likely related to CKD, denies CP, EKG showed sinus tach with nonspecific ST/T wave change. Chronic HFpEF without decompensation: TOBIAS 10/2021 EF 60% with trace TR. On Bumex 0.5 mg daily which is held due to ALVIN and dehydration     Essential HTN:slightly elevated, continue with home meds and monitor closely      Hx stage III decub ulcer s/p diverting colostomy 1/27/22: Wound care consulted and following      Anxiety: Continue home medications      ED work-up: Afebrile with BP elevate. BMP show hypokalemia with potassium 5.4, serum osmolarity elevate 322.1. EKG showed sinus tachycardia with PVC, St & T wave abnormality. Patient is asymptomatic for chest pain. CBC showed anemia with hemoglobin 7.5 which reduced compared to her baseline at 8 on 9. \"    4/13: pt doing okay, sitting up in bed with family at bedside. Denies fever/chills/CP/SOB. Still has dark blood noted in her nephrostomy bag. Denies any burning at the site of her catheter. 4/14: pt seen having returned from [facility] today and was noted to have episode of nausea/choking / coughing and a small amount of whitish emesis (barium). Pt denied abd pain and denied any SOB at that time. 4/15: pt lying in bed, no complaints. Still with blood in her nephrostomy bag. Denies SOB/cough. No fever/chills. No CP. A&Ox4.     4/16: pt doing fine, lying in bed. Offers no new complaints. Had HD today. Still with blood in her nephrostomy bag. Hgb remains stable.     4/17: pt continues to have these choking like episodes where it appears she cannot breathe. She denies any sort of dysphagia or choking sensation, but notes that she feels nauseated, like \"dry heaving\". No fever/chills. Kidney function improving. Seems in brighter spirits today. 4/18: pt reports she is doing okay. No CP/SOB, working with her acapella. Still has blood in her nephrostomy bag. No fever/chills. 4/19: no new complaints, discussed with nurse. Unable to wean off the 1L NC as pt drops into the 80's. Encouraged to continue with incentive spirometry. Hold ASA / anticoagulation pending possible tunneled cath tomorrow. 4.20.2022 Patient seen this a.m. no complaints discussed the possible need for hemodialysis. On amoxicillin and ciprofloxacin for urinary tract infection. Subjective (past 24 hours):4.21.2022 patient seen this a.m. no complaints resting comfortably. Creatinine 4.0, CBC is pending, will transfuse if okay with renal.  Tunneled dialysis catheter to be placed, marginal urine output minimal response to diuretics, anticoagulation and antiplatelets are on hold. Chief Complaint: Abnormal lab    Initial H and P:-    \"Kenzie Martin is a 73 y.o., , female presented to [facility] ED for abnormal lab from nursing home. Past medical history significant with chronic diastolic heart failure, obstructive staghorn calculus of the left kidney not a candidate for stone treatment which nephrostomy tube was placed, stage III decubitus ulcer, CKD stage III, general anxiety disorder. Visit patient awake alert and oriented to time person and place. Patient denies any chest pain, chest discomfort, abdominal pain, back pain, neck pain. Patient sister on the bedside report that patient had been having bright red blood in nephrostomy tube over last 3 days.   Today patient nursing home notify abnormal lab work which patient was transferred to [facility] ED.     Patient is chronic critical ill with PEG tube for nutritional, divers colostomy bag for stage III decubitus ulcer, nephrostomy tube drain bright red blood. Patient living in nursing home, denies alcohol use, tobacco use.     Patient was admitted and managed for ALVIN secondary to dehydration and possible obstruction.        Past medical history, family history, social history and allergies reviewed again and is unchanged since admission. ROS (All review of systems completed. Pertinent positives noted. Otherwise All other systems reviewed and negative.)     Medications:  Reviewed    Infusion Medications    dextrose      sodium chloride Stopped (04/16/22 0220)    sodium chloride       Scheduled Medications    metoprolol tartrate  25 mg Oral BID    ciprofloxacin  400 mg IntraVENous Q24H    guaiFENesin  600 mg Oral BID    amoxicillin  250 mg Oral Q24H    sodium hypochlorite   Irrigation Daily    silver nitrate applicators  1 each Topical Once    miconazole   Topical BID    [Held by provider] aspirin  81 mg Oral Daily    [Held by provider] bumetanide  0.5 mg Oral Daily    famotidine  20 mg PEG Tube Every Other Day    ferrous sulfate  325 mg Oral Every Other Day    FLUoxetine  40 mg Oral Daily    sodium chloride flush  5-40 mL IntraVENous 2 times per day     PRN Meds: ondansetron, ipratropium-albuterol, glucagon (rDNA), dextrose, glucose, dextrose bolus (hypoglycemia) **OR** dextrose bolus (hypoglycemia), acetaminophen **OR** acetaminophen, melatonin, sodium chloride flush, sodium chloride, sodium chloride      Intake/Output Summary (Last 24 hours) at 4/21/2022 1330  Last data filed at 4/21/2022 0836  Gross per 24 hour   Intake 30 ml   Output 1050 ml   Net -1020 ml       Diet:  ADULT TUBE FEEDING; PEG; Renal Formula; Cyclic; 40; 4:20 PM; 6:98 AM; 30; Other (specify); 30 mls before and after cyclic feeding  ADULT DIET; Dysphagia - Soft and Bite Sized; Moderately Thick (Honey);  No Drinking Straws  ADULT ORAL NUTRITION SUPPLEMENT; Breakfast, Lunch, Dinner; Frozen Oral Supplement    Exam:  BP (!) 149/75   Pulse 92   Temp 98.7 °F (37.1 °C)   Resp 22   Ht 4' 9\" (1.448 m)   Wt 178 lb 5.6 oz (80.9 kg)   SpO2 100%   BMI 38.59 kg/m²   General appearance: chronically ill appearing, no apparent distress, appears stated age and cooperative. HEENT: Pupils equal, round, and reactive to light. Conjunctivae/corneas clear. Neck: Supple, with full range of motion. No jugular venous distention. Trachea midline. Respiratory:  Normal respiratory effort. Clear to auscultation, bilateral crackles in bases  Cardiovascular: Regular rate and rhythm with normal S1/S2 without murmurs, rubs or gallops. Abdomen: Soft, non-tender, non-distended with normal bowel sounds. L side nephrostomy tube with blood in bag  Musculoskeletal: passive and active ROM x 4 extremities. Positive for edema  Skin: Skin color, texture, turgor normal.  No rashes or lesions. Neurologic:  Neurovascularly intact without any focal sensory/motor deficits. Cranial nerves: II-XII intact, grossly non-focal.  Psychiatric: Alert and oriented x4, thought content appropriate, normal insight  Capillary Refill: Brisk,< 3 seconds   Peripheral Pulses: +2 palpable, equal bilaterally     Labs:   Recent Labs     04/19/22  0614 04/20/22  0423   WBC 13.1* 12.3*   HGB 7.2* 7.1*   HCT 25.1* 24.2*    180     Recent Labs     04/19/22  0614 04/20/22  0423 04/21/22  0406    132* 133*   K 4.1 4.0 4.3    98 97*   CO2 23 23 24   BUN 62* 66* 70*   CREATININE 3.7* 3.6* 4.0*   CALCIUM 8.2* 8.1* 8.1*     No results for input(s): AST, ALT, BILIDIR, BILITOT, ALKPHOS in the last 72 hours. No results for input(s): INR in the last 72 hours. No results for input(s): Amparo Kras in the last 72 hours.     Microbiology:    Blood culture #1:   Lab Results   Component Value Date    BC No growth-preliminary No growth  01/21/2022       Blood culture #2:No results found for: BLOODCULT2    Organism:  Lab Results   Component Value Date ORG Pseudomonas aeruginosa 04/12/2022    ORG Klebsiella pneumoniae 04/12/2022    ORG Enterococcus faecalis - (Group D) 04/12/2022         Lab Results   Component Value Date    LABGRAM  02/22/2022     Many segmented neutrophils observed. No epithelial cells observed. No bacteria seen. MRSA culture only:No results found for: [facility]    Urine culture:   Lab Results   Component Value Date    LABURIN No growth-preliminary  01/20/2022    LABURIN [place] count: 1,000 CFU/mL  01/20/2022       Respiratory culture: No results found for: CULTRESP    Aerobic and Anaerobic :  Lab Results   Component Value Date    LABAERO  02/22/2022     No growth-preliminary Current antibiotic therapy ineffective in vitro for at least one of culture isolates. LABAERO light growth  02/22/2022    LABAERO  02/22/2022     very light growth Isolates of Methicillin Resistant Staphylococcus coagulase negative (MRSE) do NOT require CONTACT isolation. Methicillin(Oxacillin)resistant strains of staphylococci (MRSA)or(MRSE)should be considered resistant to all classes of cephalosporins, penems and beta-lactams. Lab Results   Component Value Date    LABANAE  02/22/2022     No anaerobes isolated- preliminary No anaerobes isolated        Urinalysis:      Lab Results   Component Value Date    NITRU NEGATIVE 04/12/2022    WBCUA 50-75W/CLUMPS 04/12/2022    BACTERIA FEW 04/12/2022    RBCUA > 200 04/12/2022    BLOODU LARGE 04/12/2022    SPECGRAV 1.014 01/20/2022    GLUCOSEU NEGATIVE 04/12/2022       Radiology:  XR CHEST PORTABLE   Final Result   1. Left retrocardiac opacity may relate to infiltrates versus atelectasis. 2. Increased interstitial markings in both lungs with increased groundglass opacification of both lungs may reflect underlying pulmonary edema. Clinical correlation is recommended. 3. Small bilateral pleural effusions. **This report has been created using voice recognition software.   It may contain minor errors which are inherent in voice recognition technology. **      Final report electronically signed by Dr Viktor Stacy on 4/21/2022 10:25 AM      XR CHEST PORTABLE   Final Result   1. Small left pleural effusion. 2. Left lower lobe opacity may relate to infiltrate or atelectasis. 3. Other findings as described above. **This report has been created using voice recognition software. It may contain minor errors which are inherent in voice recognition technology. **      Final report electronically signed by Dr Viktor Stacy on 4/18/2022 4:35 PM      XR FOOT LEFT (2 VIEWS)   Final Result   No acute finding. **This report has been created using voice recognition software. It may contain minor errors which are inherent in voice recognition technology. **      Final report electronically signed by Dr. Rodrick Rowe on 4/15/2022 4:18 PM      IR FLUORO GUIDED NEEDLE PLACEMENT   Final Result   Status post successful nontunneled dialysis catheter insertion. **This report has been created using voice recognition software. It may contain minor errors which are inherent in voice recognition technology. **      Final report electronically signed by Dr June Stephen on 4/15/2022 12:06 PM      XR CHEST PORTABLE   Final Result   1. Left lower lung atelectasis/infiltrate. 2. Mild stable cardiomegaly. **This report has been created using voice recognition software. It may contain minor errors which are inherent in voice recognition technology. **      Final report electronically signed by Dr. Sylvain Hargrove on 4/14/2022 11:54 AM      FL MODIFIED BARIUM SWALLOW W VIDEO   Final Result   Laryngeal penetration and aspiration with thin consistency. Laryngeal penetration with nectar thick. Recommendations available from speech therapy            **This report has been created using voice recognition software.   It may contain minor errors which are inherent in voice recognition technology. **      Final report electronically signed by Dr. Nicole Wheeler on 4/14/2022 12:39 PM      IR GUIDED NEPHROSTOGRAM/URETEROGRAM EXISTING ACCESS   Final Result   The tip of the nephrostomy tube is coiled in the left renal collecting system. **This report has been created using voice recognition software. It may contain minor errors which are inherent in voice recognition technology. **         Final report electronically signed by Dr Mari Barahona on 4/14/2022 9:08 AM      CT ABDOMEN PELVIS WO CONTRAST Additional Contrast? None   Final Result   1. Small bilateral pleural effusions with adjacent atelectasis/infiltrate. 2. Left-sided perinephric stranding and fluid collection adjacent to a left-sided nephrostomy tube, likely a subcapsular hematoma. Stable calcifications in the left kidney. 3. Cholelithiasis. 4. Sacral decubitus ulcer. Final report electronically signed by Dr. Theodora Bourne on 4/12/2022 5:00 PM      US RENAL COMPLETE   Final Result   1. Significantly limited exam.   2. Increased renal cortical echogenicity bilaterally, compatible with    medical renal disease. 3. A left-sided nephrostomy tube is partially visualized. 4. Multiple left-sided renal calculi. 5. Mild left-sided caliectasis. The left renal pelvis is nondilated. 6. Nonspecific perinephric or subcapsular fluid is noted adjacent to the    left kidney, measuring 5.3 x 2.1 x 1.4 cm. This document has been electronically signed by: Carmelo Nava M.D. on    04/12/2022 02:30 AM        CT ABDOMEN PELVIS WO CONTRAST Additional Contrast? None    Result Date: 4/12/2022  PROCEDURE: CT ABDOMEN PELVIS WO CONTRAST CLINICAL INFORMATION: Left staghorn calculus TECHNIQUE: CT of the abdomen and pelvis was performed without use of intravenous contrast. Axial images as well as sagittal and coronal reconstructions were obtained.  All CT scans at this facility use dose modulation, iterative reconstruction, and/or weight-based dosing when appropriate to reduce radiation dose to as low as reasonably achievable. COMPARISON: CT abdomen and pelvis 3/13/2022 FINDINGS: Lower thorax: There are small bilateral pleural effusions. There is adjacent lung consolidation are present. The heart is enlarged. Abdomen: Evaluation is limited due to absence of contrast. There is no free intraperitoneal air. A gastrostomy tube is in the stomach. A left mid abdominal ostomy is stable and contains loops of bowel. There is no bowel obstruction. Calcifications are present in the lumen of the gallbladder. A nephrostomy tube is now present in the left kidney. Fluid adjacent to the tube and kidney has noncontrast mean Hounsfield units of 12 (image 34). Subcapsular fluid measures up to 2.2 cm in width (image 38). Perinephric stranding is again noted. Calcifications in the left kidney are stable. Left-sided hydronephrosis is not significantly changed. Hypoattenuating lesions in the kidneys may be cysts but are incompletely characterized on the current study. There  are calcified granulomas in the liver and spleen. Atherosclerotic calcifications are present in the abdominal aorta without evidence of aneurysm. There is no mesenteric or retroperitoneal lymphadenopathy. Degenerative changes are seen in the thoracolumbar spine without evidence of aggressive osseous lesions. Pelvis: There is a Padilla catheter in a nondistended urinary bladder, likely accounting for gas in the bladder. Platelets are present in the pelvis. There are calcifications in the uterus. There is no pelvic or inguinal lymphadenopathy. There is persistent subcutaneous tissue irregularity posterior to the sacrum. Degenerative changes are present in the pelvis without evidence of aggressive osseous lesions. 1. Small bilateral pleural effusions with adjacent atelectasis/infiltrate.  2. Left-sided perinephric stranding and fluid collection adjacent to a left-sided nephrostomy tube, likely a subcapsular hematoma. Stable calcifications in the left kidney. 3. Cholelithiasis. 4. Sacral decubitus ulcer. Final report electronically signed by Dr. Megha Garcia on 4/12/2022 5:00 PM    US RENAL COMPLETE    Result Date: 4/12/2022  RENAL ULTRASOUND COMPARISON: 1/3/2022. FINDINGS: Examination is significantly limited due to patient positioning and immobility. Increased renal cortical echogenicity is noted bilaterally, compatible with medical renal disease. Multiple shadowing calculi are noted within the left kidney. For example there is a 2.7 cm stone in the left kidney on image 41. Left-sided nephrostomy tube is partially visualized. Mild caliectasis is noted in the left kidney. Left renal pelvis is nondilated. Nonspecific perinephric or subcapsular fluid is noted adjacent to the left kidney, measuring 5.3 x 2.1 x 1.4 cm on image 49. Urinary bladder contains a Padilla catheter. 1. Significantly limited exam. 2. Increased renal cortical echogenicity bilaterally, compatible with medical renal disease. 3. A left-sided nephrostomy tube is partially visualized. 4. Multiple left-sided renal calculi. 5. Mild left-sided caliectasis. The left renal pelvis is nondilated. 6. Nonspecific perinephric or subcapsular fluid is noted adjacent to the left kidney, measuring 5.3 x 2.1 x 1.4 cm.  This document has been electronically signed by: Carlos Manuel Parham M.D. on 04/12/2022 02:30 AM      Electronically signed by Viridiana Sheth DO on 4/21/2022 at 1:30 PM

## 2022-04-21 NOTE — PROGRESS NOTES
Progress note: Infectious diseases    Patient - Afia Bateman,  Age - 73 y.o.    - 1950      Room Number - 6K-18/018-A   MRN -  [id number]   Acct # - [de-identified]  Date of Admission -  2022  8:20 PM    SUBJECTIVE:   No  Newcomplaints. OBJECTIVE   VITALS    height is 4' 9\" (1.448 m) and weight is 180 lb 12.4 oz (82 kg). Her oral temperature is 99.1 °F (37.3 °C). Her blood pressure is 145/63 (abnormal) and her pulse is 69. Her respiration is 16 and oxygen saturation is 98%. Wt Readings from Last 3 Encounters:   22 180 lb 12.4 oz (82 kg)   22 134 lb (60.8 kg)   22 133 lb (60.3 kg)       I/O (24 Hours)    Intake/Output Summary (Last 24 hours) at 2022  Last data filed at 2022 1749  Gross per 24 hour   Intake 148 ml   Output 825 ml   Net -677 ml       General Appearance  Awake, alert, oriented,  Chronically sick looking  HEENT - normocephalic, atraumatic, pale  conjunctiva,  anicteric sclera  Neck - Supple, no mass  Lungs -  Bilateral   air entry, diminished breath sound at the lung bases  Cardiovascular - Heart sounds are normal.     Abdomen - soft, not distended, nontender, left nephrostomy tube.  +hematuria  Neurologic -oriented  Skin - No bruising or bleeding  Extremities - + edema,    Left nephrostomy tube    MEDICATIONS:      metoprolol tartrate  25 mg Oral BID    ciprofloxacin  400 mg IntraVENous Q24H    guaiFENesin  600 mg Oral BID    amoxicillin  250 mg Oral Q24H    sodium hypochlorite   Irrigation Daily    silver nitrate applicators  1 each Topical Once    miconazole   Topical BID    [Held by provider] aspirin  81 mg Oral Daily    [Held by provider] bumetanide  0.5 mg Oral Daily    famotidine  20 mg PEG Tube Every Other Day    ferrous sulfate  325 mg Oral Every Other Day    FLUoxetine  40 mg Oral Daily    sodium chloride flush  5-40 mL IntraVENous 2 times per day      dextrose      sodium chloride Stopped (04/16/22 0220)    sodium chloride       ondansetron, ipratropium-albuterol, glucagon (rDNA), dextrose, glucose, dextrose bolus (hypoglycemia) **OR** dextrose bolus (hypoglycemia), acetaminophen **OR** acetaminophen, melatonin, sodium chloride flush, sodium chloride, sodium chloride      LABS:     CBC:   Recent Labs     04/18/22  1032 04/19/22  0614 04/20/22  0423   WBC 12.3* 13.1* 12.3*   HGB 7.1* 7.2* 7.1*    164 180     BMP:    Recent Labs     04/18/22  0522 04/19/22  0614 04/20/22  0423    136 132*   K 4.1 4.1 4.0   CL 99 101 98   CO2 23 23 23   BUN 54* 62* 66*   CREATININE 3.2* 3.7* 3.6*   GLUCOSE 106 96 99     Calcium:  Recent Labs     04/20/22  0423   CALCIUM 8.1*      Recent Labs     04/20/22  0535 04/20/22  1058 04/20/22  1637   POCGLU 111* 101 92        CULTURES:   UA: No results for input(s): SPECGRAV, PHUR, COLORU, CLARITYU, MUCUS, PROTEINU, BLOODU, RBCUA, WBCUA, BACTERIA, NITRU, GLUCOSEU, BILIRUBINUR, UROBILINOGEN, KETUA, LABCAST, LABCASTTY, AMORPHOS in the last 72 hours. Invalid input(s): CRYSTALS  Micro:   Lab Results   Component Value Date    BC No growth-preliminary No growth  01/21/2022        Problem list of patient:     Patient Active Problem List   Diagnosis Code    Essential hypertension I10    Chronic depression F32. A    CHF (congestive heart failure) ([facility]) I50.9    Thrombocytopenia (HCC) D69.6    Moderate episode of recurrent major depressive disorder ([facility]) F33.1    Uremia N19    Hyperkalemia E87.5    Isolated non-nephrotic proteinuria R80.0    ALVIN (acute kidney injury) ([facility] [address] N17.9    Chronic right-sided heart failure (HCC) I50.812    Pulmonary HTN (HCC) I27.20    Hypotension due to hypovolemia I95.89, E86.1    Acute renal failure superimposed on stage 4 chronic kidney disease (HCC) N17.9, N18.4    Pressure injury of sacral region, stage 4 (HCC) L89.154    Acute respiratory failure (HCC) J96.00    Flash pulmonary edema ([facility]) J81.0    Shock ([facility] [address] R57.9    Aspiration pneumonia of left lung ([facility]) J69.0    Pleural effusion J90    Paroxysmal atrial fibrillation ([facility]) I48.0    Hemoptysis R04.2    Chronic respiratory failure with hypoxia ([facility]) J96.11    Pneumothorax, right J93.9    Collapse of left lung J98.11    Abnormal chest x-ray R93.89    Acute on chronic respiratory failure with hypoxia ([facility]) J96.21    Retroperitoneal hematoma K66.1    HAP (hospital-acquired pneumonia) J18.9, Y95    Colostomy in place ([facility]) Z93.3    PEG (percutaneous endoscopic gastrostomy) status ([facility]) Z93.1    Intertriginous dermatitis associated with moisture L30.4    Peristomal dermatitis associated with moisture L30.8    Nephrostomy tube displaced ([facility] [address] T83.022A    Open wound of second toe of right foot S91.104A    Open wound of second toe of left foot T12.595X    Metabolic acidosis J76.3         ASSESSMENT/PLAN   Hematuria:better.   UTI  CHF  Hypertension  ALVIN  Deconditioning  Continue current treatment  Kaela Corbett MD, MD, FACP 4/20/2022 8:30 PM

## 2022-04-22 ENCOUNTER — APPOINTMENT (OUTPATIENT)
Dept: INTERVENTIONAL RADIOLOGY/VASCULAR | Age: 72
DRG: 673 | End: 2022-04-22
Payer: MEDICARE

## 2022-04-22 LAB
ABO CHECK: NORMAL
ANION GAP SERPL CALCULATED.3IONS-SCNC: 11 MEQ/L (ref 8–16)
BASOPHILS # BLD: 0.5 %
BASOPHILS ABSOLUTE: 0.1 THOU/MM3 (ref 0–0.1)
BUN BLDV-MCNC: 37 MG/DL (ref 7–22)
CALCIUM SERPL-MCNC: 8.1 MG/DL (ref 8.5–10.5)
CHLORIDE BLD-SCNC: 100 MEQ/L (ref 98–111)
CO2: 26 MEQ/L (ref 23–33)
CREAT SERPL-MCNC: 2.4 MG/DL (ref 0.4–1.2)
DIFFERENTIAL TYPE: ABNORMAL
EOSINOPHIL # BLD: 4.3 %
EOSINOPHILS ABSOLUTE: 0.6 THOU/MM3 (ref 0–0.4)
ERYTHROCYTE [DISTWIDTH] IN BLOOD BY AUTOMATED COUNT: 15.6 % (ref 11.5–14.5)
ERYTHROCYTE [DISTWIDTH] IN BLOOD BY AUTOMATED COUNT: 55.9 FL (ref 35–45)
GFR SERPL CREATININE-BSD FRML MDRD: 20 ML/MIN/1.73M2
GLUCOSE BLD-MCNC: 106 MG/DL (ref 70–108)
GLUCOSE BLD-MCNC: 110 MG/DL (ref 70–108)
GLUCOSE BLD-MCNC: 110 MG/DL (ref 70–108)
GLUCOSE BLD-MCNC: 87 MG/DL (ref 70–108)
HCT VFR BLD CALC: 23.6 % (ref 37–47)
HCT VFR BLD CALC: 28.9 % (ref 37–47)
HEMOGLOBIN: 6.9 GM/DL (ref 12–16)
HEMOGLOBIN: 8.5 GM/DL (ref 12–16)
HYPOCHROMIA: PRESENT
IMMATURE GRANS (ABS): 0.07 THOU/MM3 (ref 0–0.07)
IMMATURE GRANULOCYTES: 0.5 %
LYMPHOCYTES # BLD: 6.5 %
LYMPHOCYTES ABSOLUTE: 0.8 THOU/MM3 (ref 1–4.8)
MCH RBC QN AUTO: 28.8 PG (ref 26–33)
MCHC RBC AUTO-ENTMCNC: 29.2 GM/DL (ref 32.2–35.5)
MCV RBC AUTO: 98.3 FL (ref 81–99)
MONOCYTES # BLD: 11 %
MONOCYTES ABSOLUTE: 1.4 THOU/MM3 (ref 0.4–1.3)
NUCLEATED RED BLOOD CELLS: 0 /100 WBC
PATHOLOGIST REVIEW: ABNORMAL
PLATELET # BLD: 184 THOU/MM3 (ref 130–400)
PLATELET ESTIMATE: ADEQUATE
PMV BLD AUTO: 9.4 FL (ref 9.4–12.4)
POTASSIUM SERPL-SCNC: 4 MEQ/L (ref 3.5–5.2)
RBC # BLD: 2.4 MILL/MM3 (ref 4.2–5.4)
RH FACTOR: NORMAL
SCAN OF BLOOD SMEAR: NORMAL
SEG NEUTROPHILS: 77.2 %
SEGMENTED NEUTROPHILS ABSOLUTE COUNT: 10 THOU/MM3 (ref 1.8–7.7)
SELECTED GEL ANTIBODY SCREEN: NORMAL
SODIUM BLD-SCNC: 137 MEQ/L (ref 135–145)
WBC # BLD: 12.9 THOU/MM3 (ref 4.8–10.8)

## 2022-04-22 PROCEDURE — 6370000000 HC RX 637 (ALT 250 FOR IP): Performed by: STUDENT IN AN ORGANIZED HEALTH CARE EDUCATION/TRAINING PROGRAM

## 2022-04-22 PROCEDURE — 85014 HEMATOCRIT: CPT

## 2022-04-22 PROCEDURE — 6360000002 HC RX W HCPCS: Performed by: RADIOLOGY

## 2022-04-22 PROCEDURE — 6370000000 HC RX 637 (ALT 250 FOR IP): Performed by: PHYSICIAN ASSISTANT

## 2022-04-22 PROCEDURE — 85025 COMPLETE CBC W/AUTO DIFF WBC: CPT

## 2022-04-22 PROCEDURE — 99233 SBSQ HOSP IP/OBS HIGH 50: CPT | Performed by: INTERNAL MEDICINE

## 2022-04-22 PROCEDURE — P9016 RBC LEUKOCYTES REDUCED: HCPCS

## 2022-04-22 PROCEDURE — 85018 HEMOGLOBIN: CPT

## 2022-04-22 PROCEDURE — 86922 COMPATIBILITY TEST ANTIGLOB: CPT

## 2022-04-22 PROCEDURE — 82948 REAGENT STRIP/BLOOD GLUCOSE: CPT

## 2022-04-22 PROCEDURE — 6360000002 HC RX W HCPCS: Performed by: PHARMACIST

## 2022-04-22 PROCEDURE — 86900 BLOOD TYPING SEROLOGIC ABO: CPT

## 2022-04-22 PROCEDURE — 2580000003 HC RX 258: Performed by: STUDENT IN AN ORGANIZED HEALTH CARE EDUCATION/TRAINING PROGRAM

## 2022-04-22 PROCEDURE — 2700000000 HC OXYGEN THERAPY PER DAY

## 2022-04-22 PROCEDURE — APPNB30 APP NON BILLABLE TIME 0-30 MINS: Performed by: UROLOGY

## 2022-04-22 PROCEDURE — 36415 COLL VENOUS BLD VENIPUNCTURE: CPT

## 2022-04-22 PROCEDURE — 86885 COOMBS TEST INDIRECT QUAL: CPT

## 2022-04-22 PROCEDURE — 90935 HEMODIALYSIS ONE EVALUATION: CPT

## 2022-04-22 PROCEDURE — 1200000000 HC SEMI PRIVATE

## 2022-04-22 PROCEDURE — 80048 BASIC METABOLIC PNL TOTAL CA: CPT

## 2022-04-22 PROCEDURE — 94640 AIRWAY INHALATION TREATMENT: CPT

## 2022-04-22 PROCEDURE — 2709999900 IR FLUORO GUIDED CVA DEVICE PLMT/REPLACE/REMOVAL

## 2022-04-22 PROCEDURE — 86901 BLOOD TYPING SEROLOGIC RH(D): CPT

## 2022-04-22 PROCEDURE — 94760 N-INVAS EAR/PLS OXIMETRY 1: CPT

## 2022-04-22 RX ORDER — MIDAZOLAM HYDROCHLORIDE 1 MG/ML
INJECTION INTRAMUSCULAR; INTRAVENOUS
Status: COMPLETED | OUTPATIENT
Start: 2022-04-22 | End: 2022-04-22

## 2022-04-22 RX ORDER — SODIUM CHLORIDE 9 MG/ML
INJECTION, SOLUTION INTRAVENOUS PRN
Status: DISCONTINUED | OUTPATIENT
Start: 2022-04-22 | End: 2022-04-28 | Stop reason: HOSPADM

## 2022-04-22 RX ADMIN — SODIUM CHLORIDE, PRESERVATIVE FREE 10 ML: 5 INJECTION INTRAVENOUS at 21:14

## 2022-04-22 RX ADMIN — CIPROFLOXACIN 400 MG: 2 INJECTION, SOLUTION INTRAVENOUS at 21:16

## 2022-04-22 RX ADMIN — GUAIFENESIN 600 MG: 600 TABLET, EXTENDED RELEASE ORAL at 10:01

## 2022-04-22 RX ADMIN — MICONAZOLE NITRATE: 20 POWDER TOPICAL at 10:01

## 2022-04-22 RX ADMIN — SODIUM CHLORIDE, PRESERVATIVE FREE 10 ML: 5 INJECTION INTRAVENOUS at 10:02

## 2022-04-22 RX ADMIN — METOPROLOL TARTRATE 25 MG: 25 TABLET, FILM COATED ORAL at 21:14

## 2022-04-22 RX ADMIN — MIDAZOLAM 0.5 MG: 1 INJECTION INTRAMUSCULAR; INTRAVENOUS at 14:10

## 2022-04-22 RX ADMIN — GUAIFENESIN 600 MG: 600 TABLET, EXTENDED RELEASE ORAL at 21:15

## 2022-04-22 RX ADMIN — IPRATROPIUM BROMIDE AND ALBUTEROL SULFATE 1 AMPULE: .5; 3 SOLUTION RESPIRATORY (INHALATION) at 09:05

## 2022-04-22 RX ADMIN — METOPROLOL TARTRATE 25 MG: 25 TABLET, FILM COATED ORAL at 10:02

## 2022-04-22 RX ADMIN — FERROUS SULFATE TAB 325 MG (65 MG ELEMENTAL FE) 325 MG: 325 (65 FE) TAB at 10:02

## 2022-04-22 RX ADMIN — HYDROMORPHONE HYDROCHLORIDE 0.5 MG: 1 INJECTION, SOLUTION INTRAMUSCULAR; INTRAVENOUS; SUBCUTANEOUS at 14:10

## 2022-04-22 RX ADMIN — MICONAZOLE NITRATE: 20 POWDER TOPICAL at 21:14

## 2022-04-22 RX ADMIN — FLUOXETINE 40 MG: 20 CAPSULE ORAL at 10:01

## 2022-04-22 RX ADMIN — DAKIN'S SOLUTION 0.125% (QUARTER STRENGTH): 0.12 SOLUTION at 10:01

## 2022-04-22 RX ADMIN — AMOXICILLIN 250 MG: 250 CAPSULE ORAL at 18:36

## 2022-04-22 ASSESSMENT — PAIN SCALES - GENERAL: PAINLEVEL_OUTOF10: 0

## 2022-04-22 NOTE — PROGRESS NOTES
Comprehensive Nutrition Assessment    Type and Reason for Visit:  Reassess (diet and tubefeeding)    Nutrition Recommendations/Plan:   1. Increased nocturnal tubefeeding tonight to better meet daily estimated needs d/t poor oral intake: Nepro at 55 ml/hr x 12 hours per day (6pm-6am). Free water flush 30 ml before and 30 mls after. Additional free water flush per MD.  2. Diet per SLP/MD.  3. Continue current ONS. Malnutrition Assessment:  Malnutrition Status: At risk for malnutrition (Comment) (04/12/22 1012)    Context:  Chronic Illness     Findings of the 6 clinical characteristics of malnutrition:  Energy Intake:  No significant decrease in energy intake (Patient has been receiving tubefeedings since 11/24/21, meeting projected nutritional needs and tolerating at [facility] prior to admit)  Weight Loss:   (Hard to assess with CHF, hx of HD (none since 12/23/21). Note relatively stable weight since 1/20/22, note weight down~ 18.1% in the last 11 months)     Body Fat Loss:  No significant body fat loss     Muscle Mass Loss:  1 - Mild muscle mass loss Temples (temporalis)  Fluid Accumulation:  1 - Mild Extremities   Strength:  Not Performed    Nutrition Assessment:      Pt with no improvement from a nutritional standpoint AEB tolerating nocturnal tube feeding which provides ~63% estimated daily caloric needs however oral intake remains inadequate.  Remains at risk for further nutritional compromise r/t dysphagia, continued poor oral intake, increased nutrient needs to support wound healing, on supplemental EN prior to admit, admit with ALVIN on CKD, blood from nephrostomy tube (obstructive staghorn calculus), anemia, chronic CHF, and underlying medical condition (hx: previous lengthy hospital course: [facility] admit 10/27 - 12/1/21 for pneumonia then [facility], then back to [facility] with eventual discharged to [facility]; vent/trach-(removed), dysphagia d/t prolonged intubations, PEG 11/24/21, hx HD (none since 12/23/21), wound debridements, 1/27/22: diverting colostomy, renal calculi, left nephrostomy tubes, HTN, OA, depression, thrombocytopenia, gout attack, CHF, ECF reported Covid in 10/2021).       Nutrition Related Findings:      Wound Type:  (stage 4 sacrum, right and left second toe blisters)     Pt. Report/Treatments/Miscellaneous: Patient seen, reports appetite still poor, mainly eating a yogurt at meals, nutrition ambassador agreed. Sometimes eats a few bites of magic cup or other meal items but not much. Tolerating nocturnal tubefeedings. Pt to have tunneled hemodialysis catheter placed, unsure long-term dialysis plans. GI Status: Nauseated this morning d/t phlemn. 100 mls colostomy 4/21/22  Pertinent Labs: 4/22/22 Sodium 137, Potassium 4, BUN 37, Creatinine 2.4, Glucose 106, Chemstick 110, Hgb 6.5  Pertinent Meds: amoxil, Cipro IV, pepcid, iron    Current Nutrition Intake & Therapies:    Average Meal Intake: 1-25% (mainly just yogurt at meals, bites of magic cup, etc)     ADULT DIET; Dysphagia - Soft and Bite Sized; Moderately Thick (Honey); No Drinking Straws  ADULT ORAL NUTRITION SUPPLEMENT; Breakfast, Lunch, Dinner; Frozen Oral Supplement  ADULT TUBE FEEDING; PEG; Renal Formula; Cyclic; 55; 4:98 PM; 8:73 AM; 30;  Other (specify); 30 mls before and after cyclic feeding  Current Tube Feeding (TF) Orders:  · Feeding Route: PEG (Placed 11/24/21)  · Formula: Renal Formula (Nepro)  · Schedule: Cyclic (0GJ-2ND)  · Water Flushes: 30 mls before and 30 mls after cyclic feeding, additional free water flush per Provider  · At [facility]: Nepro at 40 ml/hr x 24 hours/day, 1 oz prostat bolused daily, 300 mls free water flush every 8 hours~ 1799 kcals (1699 TF, 100 prostat), 93 grams protein (78 TF, 15 prostat), 164 grams CHO (154 TF, 10 prostat), 24 grams fiber, 1890 mls volume (960 TF, 30 prostat, 900 flushes) per 24 hours   · Current TF & Flush Order Provides:Nepro at 40 ml/hr x 12 hours per day (6pm-6am)~ 850 kcals (~ 63% of estimated daily needs), 39 grams protein, 77 grams CHO, 12 grams fiber, 349 mls free water (409 mls with flushes) in 480 mls volume (540 mls with flushes) per 24 hours. · New Goal TF & Flush Orders Provides (initiated 4/22/22): Nepro at 55 ml/hr x 12 hours per day (6pm-6am)~ 1168 kcals (~ 87-97% of estiamted daily needs), 53 grams protein, 106 grams CHO, 16.5 grams fiber, 480 mls water (540 mls with flushes) in 660 mls (720 mls with flushes) per 24 hours. Anthropometric Measures:  Height: 4' 9\" (144.8 cm)  Ideal Body Weight (IBW): 85 lbs (39 kg)    Admission Body Weight: 134 lb (60.8 kg) (4/11/22 no edema)  Current Body Weight: 169 lb (76.7 kg) (4/22/22 no edema, started HD this admit),   Current BMI (kg/m2): 36.6  Usual Body Weight:  (Patient \"not sure\". Per EMR: 5/10/21: 180#3. 2oz, 10/17/21: 164#, 1/20/22: 144#13.5oz, 3/13/22: 133# bedscale.   Per ECF weights: 2/4/22: 144#, 2/15/22: 135.5#, 3/1/22: 133#, 4/5/22: 133#,4/10/22: 148.7#)     Weight Adjustment For:  (Adjusted IBW for low stature: 93# (42 kg))     BMI Categories: Obese Class 2 (BMI 35.0 -39.9)    Estimated Daily Nutrient Needs:     Weight Used for Energy Requirements:  (67 kg)  Energy (kcal/day): ~ 8864-2324 kcals (18-20 kcals/kg/day)  Weight Used for Protein Requirements: Ideal (42 kg (adjusted IBW for low stature))  Protein (g/day): 50-63 grams (1.2-1.5 grams/kg/day) pending renal HD/wound status   Fluid (ml/day): per nephrology, HD initiated    Nutrition Diagnosis:   · Inadequate oral intake related to inadequate protein-energy intake as evidenced by intake 0-25%,poor intake prior to admission,nutrition support - enteral nutrition    Nutrition Interventions:   Food and/or Nutrient Delivery: Continue Current Diet,Continue Oral Nutrition Supplement,Modify Tube Feeding (Nocturnal tubefeeding increased to better meet estimated nutrient needs d/t poor oral intake)  Nutrition Education/Counseling: Education initiated (Encouraged oral intake.)  Coordination of Nutrition Care: Continue to monitor while inpatient     Goals:  Previous Goal Met: Progressing toward Goal(s)  Goals: Meet at least 75% of estimated needs,by next RD assessment     Nutrition Monitoring and Evaluation:   Behavioral-Environmental Outcomes: None Identified  Food/Nutrient Intake Outcomes: Diet Advancement/Tolerance,Food and Nutrient Intake,Enteral Nutrition Intake/Tolerance,IVF Intake  Physical Signs/Symptoms Outcomes: Biochemical Data,Chewing or Swallowing,GI Status,Fluid Status or Edema,Meal Time Behavior,Nutrition Focused Physical Findings,Skin,Weight    Discharge Planning:     Too soon to determine     Cayden Nassar RD, LD  Contact: [phone]

## 2022-04-22 NOTE — H&P
Formulation and discussion of sedation / procedure plans, risks, benefits, side effects and alternatives with patient and/or responsible adult completed.     Electronically signed by Lance Mazariegos MD on 4/22/22 at 2:21 PM EDT

## 2022-04-22 NOTE — RT PROTOCOL NOTE
RT Inhaler-Nebulizer Bronchodilator Protocol Note    There is a bronchodilator order in the chart from a provider indicating to follow the RT Bronchodilator Protocol and there is an Initiate RT Inhaler-Nebulizer Bronchodilator Protocol order as well (see protocol at bottom of note). CXR Findings:  XR CHEST PORTABLE    Result Date: 4/21/2022  1. Left retrocardiac opacity may relate to infiltrates versus atelectasis. 2. Increased interstitial markings in both lungs with increased groundglass opacification of both lungs may reflect underlying pulmonary edema. Clinical correlation is recommended. 3. Small bilateral pleural effusions. **This report has been created using voice recognition software. It may contain minor errors which are inherent in voice recognition technology. ** Final report electronically signed by Dr Maria Victoria Harris on 4/21/2022 10:25 AM      The findings from the last RT Protocol Assessment were as follows:   History Pulmonary Disease: None or smoker <15 pack years  Respiratory Pattern: Regular pattern and RR 12-20 bpm  Breath Sounds: Slightly diminished and/or crackles  Cough: Strong, productive  Indication for Bronchodilator Therapy: Decreased or absent breath sounds  Bronchodilator Assessment Score: 3    Aerosolized bronchodilator medication orders have been revised according to the RT Inhaler-Nebulizer Bronchodilator Protocol below. Respiratory Therapist to perform RT Therapy Protocol Assessment initially then follow the protocol. Repeat RT Therapy Protocol Assessment PRN for score 0-3 or on second treatment, BID, and PRN for scores above 3. No Indications - adjust the frequency to every 6 hours PRN wheezing or bronchospasm, if no treatments needed after 48 hours then discontinue using Per Protocol order mode. If indication present, adjust the RT bronchodilator orders based on the Bronchodilator Assessment Score as indicated below.   Use Inhaler orders unless patient has one or more of the following: on home nebulizer, not able to hold breath for 10 seconds, is not alert and oriented, cannot activate and use MDI correctly, or respiratory rate 25 breaths per minute or more, then use the equivalent nebulizer order(s) with same Frequency and PRN reasons based on the score. If a patient is on this medication at home then do not decrease Frequency below that used at home. 0-3 - enter or revise RT bronchodilator order(s) to equivalent RT Bronchodilator order with Frequency of every 4 hours PRN for wheezing or increased work of breathing using Per Protocol order mode. 4-6 - enter or revise RT Bronchodilator order(s) to two equivalent RT bronchodilator orders with one order with BID Frequency and one order with Frequency of every 4 hours PRN wheezing or increased work of breathing using Per Protocol order mode. 7-10 - enter or revise RT Bronchodilator order(s) to two equivalent RT bronchodilator orders with one order with TID Frequency and one order with Frequency of every 4 hours PRN wheezing or increased work of breathing using Per Protocol order mode. 11-13 - enter or revise RT Bronchodilator order(s) to one equivalent RT bronchodilator order with QID Frequency and an Albuterol order with Frequency of every 4 hours PRN wheezing or increased work of breathing using Per Protocol order mode. Greater than 13 - enter or revise RT Bronchodilator order(s) to one equivalent RT bronchodilator order with every 4 hours Frequency and an Albuterol order with Frequency of every 2 hours PRN wheezing or increased work of breathing using Per Protocol order mode. RT to enter RT Home Evaluation for COPD & MDI Assessment order using Per Protocol order mode.     Electronically signed by Howard Rodríguez RCP on 4/22/2022 at 9:17 AM

## 2022-04-22 NOTE — H&P
[address] [address]  Sedation/Analgesia History & Physical    Pt Name: Andrew Cummins  MRN: [id number]  YOB: 1950  Provider Performing Procedure: Karla Lewis MD, MD  Primary Care Physician: Daniel Almaraz MD    PRE-PROCEDURE   DNR-CCA/DNR-CC []Yes [x]No  Brief History/Pre-Procedure Diagnosis: Renal failure           MEDICAL HISTORY  []CAD/Valve  []Liver Disease  []Lung Disease []Diabetes  []Hypertension []Renal Disease  []Additional information:       has a past medical history of CHF (congestive heart failure) ([facility] [address] Depression, Gout attack, Hemodialysis patient ([facility] [address] Hypertension, Osteoarthritis, Pulmonary artery hypertension ([facility] [address] Renal calculi, and Thrombocytopenia ([facility] [address] SURGICAL HISTORY   has a past surgical history that includes Appendectomy (1960); Facial nerve surgery; Pressure ulcer debridement (Right, 8/6/2021); IR GUIDED NEPHROSTOMY CATH PLACEMENT LEFT (11/1/2021); bronchoscopy (N/A, 11/22/2021); IR CHEST TUBE INSERTION (11/26/2021); Gastrostomy tube placement (N/A, 11/24/2021); bronchoscopy (N/A, 11/30/2021); colectomy (N/A, 1/27/2022); and IR GUIDED NEPHROSTOMY CATH PLACEMENT LEFT (3/14/2022).   Additional information:       ALLERGIES   Allergies as of 04/11/2022    (No Known Allergies)     Additional information:       MEDICATIONS   Coumadin Use Last 5 Days [x]No []Yes  Antiplatelet drug therapy use last 5 days  [x]No []Yes  Other anticoagulant use last 5 days  [x]No []Yes    Current Facility-Administered Medications:     0.9 % sodium chloride infusion, , IntraVENous, PRN, LANE Adams - CNP    ondansetron [facility]) injection 4 mg, 4 mg, IntraVENous, Q6H PRN, Delfina Gutierrez PA-C    metoprolol tartrate (LOPRESSOR) tablet 25 mg, 25 mg, Oral, BID, Victor M Sharif, DO, 25 mg at 04/22/22 1002    ciprofloxacin (CIPRO) IVPB 400 mg, 400 mg, IntraVENous, Q24H, Hernando Smallwood, [facility], Stopped at 04/21/22 6822    guaiFENesin [facility]) extended release tablet 600 mg, 600 mg, Oral, BID, Renzoangeles Hernandez PA-C, 600 mg at 04/22/22 1001    amoxicillin (AMOXIL) capsule 250 mg, 250 mg, Oral, Q24H, EDE Christensen-NOHA, 250 mg at 04/21/22 1442    ipratropium-albuterol (DUONEB) nebulizer solution 1 ampule, 1 ampule, Inhalation, Q4H PRN, Renzoangeles Hernandez PA-C, 1 ampule at 04/22/22 0905    sodium hypochlorite (DAKINS) 0.125 % external solution, , Irrigation, Daily, Jose Joy MD, Given at 04/22/22 1001    silver nitrate applicators applicator 1 each, 1 each, Topical, Once, Jose Joy MD    miconazole (MICOTIN) 2 % powder, , Topical, BID, Renzo Hernandez PA-C, Given at 04/22/22 1001    glucagon (rDNA) injection 1 mg, 1 mg, IntraMUSCular, PRN, Geovanna Gutierrez PA-C    dextrose 5 % solution, 100 mL/hr, IntraVENous, PRN, Maryanne Gutierrez PA-C    glucose chewable tablet 4 each, 4 tablet, Oral, PRN, EDE Christensen-NOAH    dextrose bolus (hypoglycemia) 10% 125 mL, 125 mL, IntraVENous, PRN, Stopped at 04/13/22 0838 **OR** dextrose bolus (hypoglycemia) 10% 250 mL, 250 mL, IntraVENous, PRN, Renzo Hernandez PA-C    acetaminophen (TYLENOL) tablet 650 mg, 650 mg, Oral, Q6H PRN, 650 mg at 04/18/22 0447 **OR** acetaminophen (TYLENOL) suppository 650 mg, 650 mg, Rectal, Q6H PRN, Paloma Pavan V, DO    [Held by provider] aspirin chewable tablet 81 mg, 81 mg, Oral, Daily, Paloma Pavan V, DO    [Held by provider] bumetanide (BUMEX) tablet 0.5 mg, 0.5 mg, Oral, Daily, Paloma Pavan V, DO    famotidine (PEPCID) tablet 20 mg, 20 mg, PEG Tube, Every Other Day, Paloma Pavan V, DO, 20 mg at 04/21/22 0847    ferrous sulfate (IRON 325) tablet 325 mg, 325 mg, Oral, Every Other Day, Paloma Pavan V, DO, 325 mg at 04/22/22 1002    FLUoxetine (PROZAC) capsule 40 mg, 40 mg, Oral, Daily, Paloma Pavan V, DO, 40 mg at 04/22/22 1001    melatonin tablet 6 mg, 6 mg, Oral, Nightly PRN, Paloma Pavan V, DO    sodium chloride flush 0.9 % injection 5-40 mL, 5-40 mL, IntraVENous, 2 times per day, Paloma Browne V, DO, 10 mL at 04/22/22 1002    sodium chloride flush 0.9 % injection 5-40 mL, 5-40 mL, IntraVENous, PRN, Paloma Pavan V, DO    0.9 % sodium chloride infusion, , IntraVENous, PRN, Paloma Pavan V, DO, Stopped at 04/16/22 0220    0.9 % sodium chloride infusion, , IntraVENous, PRN, Ale Lathe V, DO  Prior to Admission medications    Medication Sig Start Date End Date Taking? Authorizing Provider   bumetanide (BUMEX) 0.5 MG tablet Take 0.5 mg by mouth daily    Historical Provider, MD   metoclopramide (REGLAN) 5 MG tablet Take 5 mg by mouth every 8 hours    Historical Provider, MD   sodium chloride 0.9 % SOLN Inject as directed Flush TID nephrostomy tube 10ml. Historical Provider, MD   miconazole (MICOTIN) 2 % powder Apply topically daily as needed for Itching Apply topically 2 times daily. Historical Provider, MD   silver nitrate applicators 65-79 % applicator Apply 1 each topically Once a week on Friday and PRN to prowed flesh at peg site    Historical Provider, MD   folic acid (FOLVITE) 1 MG tablet Take 1 tablet by mouth daily 2/4/22   iMreya Weber,    ferrous sulfate (IRON 325) 325 (65 Fe) MG tablet Take 1 tablet by mouth every other day  Patient taking differently: Take 325 mg by mouth daily (with breakfast)  2/4/22   Cherelle Rain Phenes, DO   melatonin 3 MG TABS tablet Take 2 tablets by mouth nightly as needed (anxiety, sleep) 2/4/22   Cherelle Rain Phenes, DO   famotidine (PEPCID) 20 MG tablet 20 mg by PEG Tube route daily    Historical Provider, MD   senna (SENOKOT) 8.8 MG/5ML SYRP syrup Take 5 mLs by mouth nightly as needed    Historical Provider, MD   metoprolol tartrate (LOPRESSOR) 25 MG tablet Take 0.5 tablets by mouth 2 times daily 10/25/21   Holli Smoke, APRN - CNP   sodium hypochlorite (DAKINS) 0.125 % SOLN external solution Apply Dakin's moistened gauze dressings to wound twice daily and as needed.   Patient taking differently: 2 times daily as needed (BID and PRN when wound vac not working or in place) Apply Dakin's moistened gauze dressings to wound twice daily and as needed when wound vac not working or in place. 8/24/21   Bonny Roberts, APRN - CNP   sodium chloride 1 g tablet Take 1 tablet by mouth 2 times daily 8/3/21   Ran Arellano MD   FLUoxetine (PROZAC) 40 MG capsule Take 1 capsule by mouth daily 7/16/21   Darrell Morgan MD   Multiple Vitamins-Minerals (MULTIVITAMIN WOMEN PO) Take 1 tablet by mouth daily    Historical Provider, MD   acetaminophen (TYLENOL) 650 MG extended release tablet Take 650 mg by mouth every 8 hours as needed for Pain    Historical Provider, MD   docusate sodium (COLACE) 100 MG capsule Take 100 mg by mouth as needed for Constipation (Daily PRN)     Historical Provider, MD   aspirin 81 MG tablet Take 81 mg by mouth daily     Historical Provider, MD     Additional information:       VITAL SIGNS   Vitals:    04/22/22 1417   BP: (!) 143/65   Pulse: 76   Resp: 25   Temp:    SpO2: 100%       PHYSICAL:   Heart:  [x]Regular rate and rhythm  []Other:    Lungs:  [x]Clear    []Other:    Abdomen: [x]Soft    []Other:    Mental Status: [x]Alert & Oriented  []Other:      PLANNED PROCEDURE   []Biospy []Arteriogram              []Drainage   []Mediport Insertion  []Fistulogram []IV access       []Vertebroplasty / Augmentation  []IVC filter [x]Dialysis catheter []Biliary drainage  []Other: []CAPD Catheter []Nephrostomy Tube / Stent  SEDATION  Planned agent:[x]Midazolam []Meperidine []Sublimaze [x]Dilaudid []Morphine     []Diazepam  []Other:     ASA Classification:  []1 [x]2 []3 []4 []5  Class 1: A normal healthy patient  Class 2: Pt with mild to moderate systemic disease  Class 3: Severe systemic disease or disturbance  Class 4: Severe systemic disorders that are already life threatening. Class 5: Moribund pt with little chances of survival, for more than 24 hours.   Mallampati I Airway Classification:   []1 [x]2 []3 []4    [x]Pre-procedure diagnostic studies complete and results available. Comment:    [x]Previous sedation/anesthesia experiences assessed. Comment:    [x]The patient is an appropriate candidate to undergo the planned procedure sedation and anesthesia. (Refer to nursing sedation/analgesia documentation record)  [x]Formulation and discussion of sedation/procedure plan, risks, and expectations with patient and/or responsible adult completed. [x]Patient examined immediately prior to the procedure.  (Refer to nursing sedation/analgesia documentation record)    Jered Moody MD, MD  Electronically signed 4/22/2022 at 2:21 PM

## 2022-04-22 NOTE — PROGRESS NOTES
1348 Patient received in IR for temp to tunneled dialysis catheter. 1350 This procedure has been fully reviewed with the patient and written informed consent has been obtained. 1404 Procedure started with Dr. Vivian Blake  [id number] Procedure completed; patient tolerated well. Bio-patch, op-site, Sutures, 2x2, op-site; no bleeding noted. 1426 Patient on bed; comfort ensured. Ice pack applied. 1427 Patient taken to dialysis via bed. Report called to [facility] (Sarah). See message from dgt below - I am assuming she should get booster, per CDC recommendations, and her weight loss is more likely related to her declining mental status and eating habits, etc and not the vaccine?

## 2022-04-22 NOTE — PROGRESS NOTES
Progress note: Infectious diseases    Patient - Violet Kerr,  Age - 73 y.o.    - 1950      Room Number - 6K-18/018-A   MRN -  [id number]   Acct # - [de-identified]  Date of Admission -  2022  8:20 PM    SUBJECTIVE:   She will have tunnelled catheter for HD  OBJECTIVE   VITALS    height is 4' 9\" (1.448 m) and weight is 169 lb (76.7 kg). Her oral temperature is 99.1 °F (37.3 °C). Her blood pressure is 130/60 and her pulse is 74. Her respiration is 18 and oxygen saturation is 100%. Wt Readings from Last 3 Encounters:   22 169 lb (76.7 kg)   22 134 lb (60.8 kg)   22 133 lb (60.3 kg)       I/O (24 Hours)    Intake/Output Summary (Last 24 hours) at 2022 1319  Last data filed at 2022 1123  Gross per 24 hour   Intake 830 ml   Output 4640 ml   Net -3810 ml       General Appearance  Awake, alert, oriented,  Chronically sick looking  HEENT - normocephalic, atraumatic, pale  conjunctiva,  anicteric sclera  Neck - Supple, no mass  Lungs -  Bilateral   air entry, diminished breath sound at the lung bases  Cardiovascular - Heart sounds are normal.     Abdomen - soft, not distended, nontender, left nephrostomy tube.  +hematuria  Neurologic -oriented  Skin - No bruising or bleeding  Extremities - + edema,    Left nephrostomy tube    MEDICATIONS:      metoprolol tartrate  25 mg Oral BID    ciprofloxacin  400 mg IntraVENous Q24H    guaiFENesin  600 mg Oral BID    amoxicillin  250 mg Oral Q24H    sodium hypochlorite   Irrigation Daily    silver nitrate applicators  1 each Topical Once    miconazole   Topical BID    [Held by provider] aspirin  81 mg Oral Daily    [Held by provider] bumetanide  0.5 mg Oral Daily    famotidine  20 mg PEG Tube Every Other Day    ferrous sulfate  325 mg Oral Every Other Day    FLUoxetine  40 mg Oral Daily    sodium chloride flush  5-40 mL IntraVENous 2 times per day      sodium chloride      dextrose      sodium chloride Stopped (04/16/22 0220)    sodium chloride       sodium chloride, ondansetron, ipratropium-albuterol, glucagon (rDNA), dextrose, glucose, dextrose bolus (hypoglycemia) **OR** dextrose bolus (hypoglycemia), acetaminophen **OR** acetaminophen, melatonin, sodium chloride flush, sodium chloride, sodium chloride      LABS:     CBC:   Recent Labs     04/20/22  0423 04/21/22  1526 04/22/22  0512   WBC 12.3* 18.2* 12.9*   HGB 7.1* 7.5* 6.9*    181 184     BMP:    Recent Labs     04/20/22  0423 04/21/22  0406 04/22/22  0512   * 133* 137   K 4.0 4.3 4.0   CL 98 97* 100   CO2 23 24 26   BUN 66* 70* 37*   CREATININE 3.6* 4.0* 2.4*   GLUCOSE 99 95 106     Calcium:  Recent Labs     04/22/22  0512   CALCIUM 8.1*      Recent Labs     04/21/22 2005 04/22/22  0647 04/22/22  1113   POCGLU 134* 110* 110*        CULTURES:   UA: No results for input(s): SPECGRAV, PHUR, COLORU, CLARITYU, MUCUS, PROTEINU, BLOODU, RBCUA, WBCUA, BACTERIA, NITRU, GLUCOSEU, BILIRUBINUR, UROBILINOGEN, KETUA, LABCAST, LABCASTTY, AMORPHOS in the last 72 hours. Invalid input(s): CRYSTALS  Micro:   Lab Results   Component Value Date    BC No growth-preliminary No growth  01/21/2022        Problem list of patient:     Patient Active Problem List   Diagnosis Code    Essential hypertension I10    Chronic depression F32. A    CH[facility]congestive [facility] failure) ([facility]) I50.9    Thrombocytopenia (HCC) D69.6    Moderate episode of recurrent major depressive disorder (HCC) F33.1    Uremia N19    Hyperkalemia E87.5    Isolated non-nephrotic proteinuria R80.0    ALVIN (acute kidney injury) ([facility] [address] N17.9    Chronic right-sided heart failure (HCC) I50.812    Pulmonary HTN (HCC) I27.20    Hypotension due to hypovolemia I95.89, E86.1    Acute renal failure superimposed on stage 4 chronic kidney disease (HCC) N17.9, N18.4    Pressure injury of sacral region, stage 4 (HCC) L89.154    Acute respiratory failure ([facility]) J96.00    Flash pulmonary edema ([facility]) J81.0    Shock ([facility] [address] R57.9    Aspiration pneumonia of left lung ([facility]) J69.0    Pleural effusion J90    Paroxysmal atrial fibrillation ([facility]) I48.0    Hemoptysis R04.2    Chronic respiratory failure with hypoxia ([facility]) J96.11    Pneumothorax, right J93.9    Collapse of left lung J98.11    Abnormal chest x-ray R93.89    Acute on chronic respiratory failure with hypoxia ([facility]) J96.21    Retroperitoneal hematoma K66.1    HAP (hospital-acquired pneumonia) J18.9, Y95    Colostomy in place ([facility]) Z93.3    PEG (percutaneous endoscopic gastrostomy) status ([facility]) Z93.1    Intertriginous dermatitis associated with moisture L30.4    Peristomal dermatitis associated with moisture L30.8    Nephrostomy tube displaced ([address] T83.022A    Open wound of second toe of right foot S91.104A    Open wound of second toe of left foot F56.583B    Metabolic acidosis C73.1         ASSESSMENT/PLAN   Hematuria: improving  CHF  Hypertension  ALVIN she will be on HD  Deconditioning  Continue current treatment  ID will see her as needed. Call if there area issues.   Anjana Knight MD, MD, FACP 4/22/2022 1:19 PM

## 2022-04-22 NOTE — PROGRESS NOTES
Off floor during rounds  Discussed continued neph bleeding with Dr Brock Diop  Will stop flushing neph every shift and monitor  Nephrostogram 4/13/22 with tube in appropriate position. If nephrostomy tube needs to be changed, a nephroureteral catheter would be recommended to try to prevent any further dislodging with positional changes  Left PCNL has not been scheduled due to patient's continuing medical problems.

## 2022-04-22 NOTE — PROGRESS NOTES
[facility]         Renal Inpatient Follow-Up note         4/22/2022 8:04 AM    Pt Name:   Fariha Scott  YOB: 1950  Attending:   Yasir Samuel DO    Chief Complaint : Fariha Scott is a 73 y.o. 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 No distress  Feels ok. No cp or SOB. Some urine output noted     Scheduled Medications :    metoprolol tartrate  25 mg Oral BID    ciprofloxacin  400 mg IntraVENous Q24H    guaiFENesin  600 mg Oral BID    amoxicillin  250 mg Oral Q24H    sodium hypochlorite   Irrigation Daily    silver nitrate applicators  1 each Topical Once    miconazole   Topical BID    [Held by provider] aspirin  81 mg Oral Daily    [Held by provider] bumetanide  0.5 mg Oral Daily    famotidine  20 mg PEG Tube Every Other Day    ferrous sulfate  325 mg Oral Every Other Day    FLUoxetine  40 mg Oral Daily    sodium chloride flush  5-40 mL IntraVENous 2 times per day      sodium chloride      dextrose      sodium chloride Stopped (04/16/22 0220)    sodium chloride         Vitals :  BP (!) 126/52   Pulse 71   Temp 98.1 °F (36.7 °C) (Oral)   Resp 16   Ht 4' 9\" (1.448 m)   Wt 169 lb (76.7 kg)   SpO2 94%   BMI 36.57 kg/m²     24HR INTAKE/OUTPUT:      Intake/Output Summary (Last 24 hours) at 4/22/2022 0804  Last data filed at 4/21/2022 1756  Gross per 24 hour   Intake 830 ml   Output 4550 ml   Net -3720 ml     Last 3 weights  Wt Readings from Last 3 Encounters:   04/22/22 169 lb (76.7 kg)   03/29/22 134 lb (60.8 kg)   03/13/22 133 lb (60.3 kg)           Physical Exam :  General -- well developed and well nourished  HEENT-normal external appearance of both ears and nose. Mouth/throat  - oropharynx appears to be clear and moist  Eyes-conjunctiva normal no icterus or pallor. Neck-normal range of motion supple no JVD.   Lungs -- clear  Heart -- S1, S2 heard, JVD - no  Abdomen - soft, non-tender  Extremities -- edema --no significant edema noted  CNS - awake and alert  Psychiatric-mood and memory appears normal         Last 3 CBC   Recent Labs     04/20/22  0423 04/21/22  1526 04/22/22  0512   WBC 12.3* 18.2* 12.9*   RBC 2.44* 2.60* 2.40*   HGB 7.1* 7.5* 6.9*   HCT 24.2* 25.4* 23.6*    181 184     Last 3 CMP  Recent Labs     04/20/22  0423 04/21/22  0406 04/22/22  0512   * 133* 137   K 4.0 4.3 4.0   CL 98 97* 100   CO2 23 24 26   BUN 66* 70* 37*   CREATININE 3.6* 4.0* 2.4*   CALCIUM 8.1* 8.1* 8.1*             ASSESSMENT / Plan   1. Renal -acute kidney injury on chronic kidney disease stage III exact etiology is not clear however it appears most likely some component of volume depletion and the use of diuretic. She is also probably has some septic ATN  ? Renal ultrasound scan does not show any acute abnormalities. ? Had HD done yesterday, tunneled cath today. ? No need for HD. Await placement .      2. Electrolytes -within normal limit  3. Mild acidosis secondary to renal dysfunction-improved with dialysis  4. Anemia probably due to bleeding/ renal dysfunction. For a blood transfusion today. 5. Hx of chronic diastolic congestive heart failure appears to be well compensated hold diuretics for now  6. Essential hypertension-stable  7. Meds reviewed and discussed with patient and nursing staff      Dr. Brina Maya MD, M,D.  [facility].

## 2022-04-22 NOTE — PROGRESS NOTES
Hospitalist Progress Note      Patient:  Stefany Real    Unit/Bed:6K-18/018-A  YOB: 1950  MRN: [id number]   Acct: [de-identified]   PCP: Stormy Gudino MD  Date of Admission: 4/11/2022    Assessment/Plan:     ALVIN on CKD stage III, improving: unclear etiology but some volume depletion contributing in additional to diuretic use per Nephro who has been consulted. Renal US without acute abnormalities. Aggressive IVFs. Avoid nephrotoxic agents / contrast. Renal US without acute abnormalities. S/p temporary dialysis catheter and RRT. 4/19: Nephro plans potential tunneled catheter tomorrow, no anticoagulation. Continue with IVF for now. Monitor labs in am for final decision, appreciate Nephro. Acute hypoxic respiratory failure: on 1 L NC with SpO2 96%. Encourage IS / Acapella. CXR shows LLL infiltrate vs atelectasis. Suspect atelectasis with pt's presentation. Wean O2 as tolerated. Hypomagnesemia, resolved: replaced      Hyperkalemia, resolved: Nephrology following. Check daily BMP. Dislodged L nephrostomy tube s/p nephrostogram, resolved: no indication of dislodgement on CT A/P, IR has been consulted for assessment. Previously dislodged and replaced multiple times, last being 3/14/22. L staghorn calculus / bleeding L nephrostomy tube: Urology has been consulted. Repeat CT A/P from 4/12 shows L sided perinephric stranding and fluid collection adjacent to tube which is felt to likely be subcapsular hematoma. Acute cystitis: chronic seaman catheter, UA shows few bacteria, moderate leukocyte esterase and 50-75 with clumps WBC. Urine culture returned growing nonfermenting gram-negative bacilli, enteric gram-negative bacilli, and gram-positive cocci. Pt denies any burning at the site of her catheter. Will discuss with Urology if treatment is indicated at this time. Afebrile, no leukocytosis.     4/18: urine gx returned growing Pseudomonas or [place], Klebsiella pneumoniae and Enterococcus faecalis group D. Continue Cipro and add PO amoxicillin per ID (complete 7 days)     Acute on chronic normocytic anemia: Hg 7.5 on admission with baseline Hg 8.0s-9.0s. Mild-moderate dysphagia: seen by SLP, underwent MBS. Recommended honey thick liquids, no straws, and soft and bite sized. Metabolic acidosis: on sodium bicarbonate per Nephro, continue to monitor. Will correct with HD. Resolved. Elevated troponin: likely related to CKD, denies CP, EKG showed sinus tach with nonspecific ST/T wave change. Chronic HFpEF without decompensation: TOBIAS 10/2021 EF 60% with trace TR. On Bumex 0.5 mg daily which is held due to ALVIN and dehydration     Essential HTN:slightly elevated, continue with home meds and monitor closely      Hx stage III decub ulcer s/p diverting colostomy 1/27/22: Wound care consulted and following      Anxiety: Continue home medications      ED work-up: Afebrile with BP elevate. BMP show hypokalemia with potassium 5.4, serum osmolarity elevate 322.1. EKG showed sinus tachycardia with PVC, St & T wave abnormality. Patient is asymptomatic for chest pain. CBC showed anemia with hemoglobin 7.5 which reduced compared to her baseline at 8 on 9. \"    4/13: pt doing okay, sitting up in bed with family at bedside. Denies fever/chills/CP/SOB. Still has dark blood noted in her nephrostomy bag. Denies any burning at the site of her catheter. 4/14: pt seen having returned from [facility] today and was noted to have episode of nausea/choking / coughing and a small amount of whitish emesis (barium). Pt denied abd pain and denied any SOB at that time. 4/15: pt lying in bed, no complaints. Still with blood in her nephrostomy bag. Denies SOB/cough. No fever/chills. No CP. A&Ox4.     4/16: pt doing fine, lying in bed. Offers no new complaints. Had HD today. Still with blood in her nephrostomy bag. Hgb remains stable.     4/17: pt continues to have these choking like episodes where it appears she cannot breathe. She denies any sort of dysphagia or choking sensation, but notes that she feels nauseated, like \"dry heaving\". No fever/chills. Kidney function improving. Seems in brighter spirits today. 4/18: pt reports she is doing okay. No CP/SOB, working with her acapella. Still has blood in her nephrostomy bag. No fever/chills. 4/19: no new complaints, discussed with nurse. Unable to wean off the 1L NC as pt drops into the 80's. Encouraged to continue with incentive spirometry. Hold ASA / anticoagulation pending possible tunneled cath tomorrow. 4.20.2022 Patient seen this a.m. no complaints discussed the possible need for hemodialysis. On amoxicillin and ciprofloxacin for urinary tract infection. 4.21.2022 patient seen this a.m. no complaints resting comfortably. Creatinine 4.0, CBC is pending, will transfuse if okay with renal.  Tunneled dialysis catheter to be placed, marginal urine output minimal response to diuretics, anticoagulation and antiplatelets are on hold. 4.22.2022 patient seen this a.m. resting comfortably we will transfuse 1 unit of packed RBCs, Tunnel dialysis catheter to be placed today. Creatinine 2.4 today, GFR 20    Chief Complaint: Abnormal lab    Initial H and P:-    \"Kenzie Holt is a 73 y.o., , female presented to [facility] ED for abnormal lab from nursing home. Past medical history significant with chronic diastolic heart failure, obstructive staghorn calculus of the left kidney not a candidate for stone treatment which nephrostomy tube was placed, stage III decubitus ulcer, CKD stage III, general anxiety disorder. Visit patient awake alert and oriented to time person and place. Patient denies any chest pain, chest discomfort, abdominal pain, back pain, neck pain. Patient sister on the bedside report that patient had been having bright red blood in nephrostomy tube over last 3 days.   Today patient nursing home cyclic feeding  ADULT DIET; Dysphagia - Soft and Bite Sized; Moderately Thick (Honey); No Drinking Straws  ADULT ORAL NUTRITION SUPPLEMENT; Breakfast, Lunch, Dinner; Frozen Oral Supplement    Exam:  /60   Pulse 81   Temp 98.9 °F (37.2 °C) (Oral)   Resp 20   Ht 4' 9\" (1.448 m)   Wt 169 lb (76.7 kg)   SpO2 100%   BMI 36.57 kg/m²   General appearance: chronically ill appearing, no apparent distress, appears stated age and cooperative. HEENT: Pupils equal, round, and reactive to light. Conjunctivae/corneas clear. Neck: Supple, with full range of motion. No jugular venous distention. Trachea midline. Respiratory:  Normal respiratory effort. Clear to auscultation, bilateral crackles in bases  Cardiovascular: Regular rate and rhythm with normal S1/S2 without murmurs, rubs or gallops. Abdomen: Soft, non-tender, non-distended with normal bowel sounds. L side nephrostomy tube with blood in bag  Musculoskeletal: passive and active ROM x 4 extremities. Positive for edema  Skin: Skin color, texture, turgor normal.  No rashes or lesions. Neurologic:  Neurovascularly intact without any focal sensory/motor deficits. Cranial nerves: II-XII intact, grossly non-focal.  Psychiatric: Alert and oriented x4, thought content appropriate, normal insight  Capillary Refill: Brisk,< 3 seconds   Peripheral Pulses: +2 palpable, equal bilaterally     Labs:   Recent Labs     04/20/22  0423 04/21/22  1526 04/22/22  0512   WBC 12.3* 18.2* 12.9*   HGB 7.1* 7.5* 6.9*   HCT 24.2* 25.4* 23.6*    181 184     Recent Labs     04/20/22  0423 04/21/22  0406 04/22/22  0512   * 133* 137   K 4.0 4.3 4.0   CL 98 97* 100   CO2 23 24 26   BUN 66* 70* 37*   CREATININE 3.6* 4.0* 2.4*   CALCIUM 8.1* 8.1* 8.1*     No results for input(s): AST, ALT, BILIDIR, BILITOT, ALKPHOS in the last 72 hours. No results for input(s): INR in the last 72 hours. No results for input(s): Blaire Highman in the last 72 hours.     Microbiology: Blood culture #1:   Lab Results   Component Value Date    BC No growth-preliminary No growth  01/21/2022       Blood culture #2:No results found for: Leticia Mittal    Organism:  Lab Results   Component Value Date    ORG Pseudomonas aeruginosa 04/12/2022    ORG Klebsiella pneumoniae 04/12/2022    ORG Enterococcus faecalis - (Group D) 04/12/2022         Lab Results   Component Value Date    LABGRAM  02/22/2022     Many segmented neutrophils observed. No epithelial cells observed. No bacteria seen. MRSA culture only:No results found for: [facility]    Urine culture:   Lab Results   Component Value Date    LABURIN No growth-preliminary  01/20/2022    LABURIN [place] count: 1,000 CFU/mL  01/20/2022       Respiratory culture: No results found for: CULTRESP    Aerobic and Anaerobic :  Lab Results   Component Value Date    LABAERO  02/22/2022     No growth-preliminary Current antibiotic therapy ineffective in vitro for at least one of culture isolates. LABAERO light growth  02/22/2022    LABAERO  02/22/2022     very light growth Isolates of Methicillin Resistant Staphylococcus coagulase negative (MRSE) do NOT require CONTACT isolation. Methicillin(Oxacillin)resistant strains of staphylococci (MRSA)or(MRSE)should be considered resistant to all classes of cephalosporins, penems and beta-lactams. Lab Results   Component Value Date    LABANAE  02/22/2022     No anaerobes isolated- preliminary No anaerobes isolated        Urinalysis:      Lab Results   Component Value Date    NITRU NEGATIVE 04/12/2022    WBCUA 50-75W/CLUMPS 04/12/2022    BACTERIA FEW 04/12/2022    RBCUA > 200 04/12/2022    BLOODU LARGE 04/12/2022    SPECGRAV 1.014 01/20/2022    GLUCOSEU NEGATIVE 04/12/2022       Radiology:  XR CHEST PORTABLE   Final Result   1. Left retrocardiac opacity may relate to infiltrates versus atelectasis.       2. Increased interstitial markings in both lungs with increased groundglass opacification of both lungs may reflect underlying pulmonary edema. Clinical correlation is recommended. 3. Small bilateral pleural effusions. **This report has been created using voice recognition software. It may contain minor errors which are inherent in voice recognition technology. **      Final report electronically signed by Dr Birdie Santiago on 4/21/2022 10:25 AM      XR CHEST PORTABLE   Final Result   1. Small left pleural effusion. 2. Left lower lobe opacity may relate to infiltrate or atelectasis. 3. Other findings as described above. **This report has been created using voice recognition software. It may contain minor errors which are inherent in voice recognition technology. **      Final report electronically signed by Dr Birdie Santiago on 4/18/2022 4:35 PM      XR FOOT LEFT (2 VIEWS)   Final Result   No acute finding. **This report has been created using voice recognition software. It may contain minor errors which are inherent in voice recognition technology. **      Final report electronically signed by Dr. Tere Chester on 4/15/2022 4:18 PM      IR FLUORO GUIDED NEEDLE PLACEMENT   Final Result   Status post successful nontunneled dialysis catheter insertion. **This report has been created using voice recognition software. It may contain minor errors which are inherent in voice recognition technology. **      Final report electronically signed by Dr Zarina Hurtado on 4/15/2022 12:06 PM      XR CHEST PORTABLE   Final Result   1. Left lower lung atelectasis/infiltrate. 2. Mild stable cardiomegaly. **This report has been created using voice recognition software. It may contain minor errors which are inherent in voice recognition technology. **      Final report electronically signed by Dr. Ever Pineda on 4/14/2022 11:54 AM      FL MODIFIED BARIUM SWALLOW W VIDEO   Final Result   Laryngeal penetration and aspiration with thin consistency.    Laryngeal penetration Left staghorn calculus TECHNIQUE: CT of the abdomen and pelvis was performed without use of intravenous contrast. Axial images as well as sagittal and coronal reconstructions were obtained. All CT scans at this facility use dose modulation, iterative reconstruction, and/or weight-based dosing when appropriate to reduce radiation dose to as low as reasonably achievable. COMPARISON: CT abdomen and pelvis 3/13/2022 FINDINGS: Lower thorax: There are small bilateral pleural effusions. There is adjacent lung consolidation are present. The heart is enlarged. Abdomen: Evaluation is limited due to absence of contrast. There is no free intraperitoneal air. A gastrostomy tube is in the stomach. A left mid abdominal ostomy is stable and contains loops of bowel. There is no bowel obstruction. Calcifications are present in the lumen of the gallbladder. A nephrostomy tube is now present in the left kidney. Fluid adjacent to the tube and kidney has noncontrast mean Hounsfield units of 12 (image 34). Subcapsular fluid measures up to 2.2 cm in width (image 38). Perinephric stranding is again noted. Calcifications in the left kidney are stable. Left-sided hydronephrosis is not significantly changed. Hypoattenuating lesions in the kidneys may be cysts but are incompletely characterized on the current study. There  are calcified granulomas in the liver and spleen. Atherosclerotic calcifications are present in the abdominal aorta without evidence of aneurysm. There is no mesenteric or retroperitoneal lymphadenopathy. Degenerative changes are seen in the thoracolumbar spine without evidence of aggressive osseous lesions. Pelvis: There is a Padilla catheter in a nondistended urinary bladder, likely accounting for gas in the bladder. Platelets are present in the pelvis. There are calcifications in the uterus. There is no pelvic or inguinal lymphadenopathy. There is persistent subcutaneous tissue irregularity posterior to the sacrum. Degenerative changes are present in the pelvis without evidence of aggressive osseous lesions. 1. Small bilateral pleural effusions with adjacent atelectasis/infiltrate. 2. Left-sided perinephric stranding and fluid collection adjacent to a left-sided nephrostomy tube, likely a subcapsular hematoma. Stable calcifications in the left kidney. 3. Cholelithiasis. 4. Sacral decubitus ulcer. Final report electronically signed by Dr. Candelaria Barron on 4/12/2022 5:00 PM    US RENAL COMPLETE    Result Date: 4/12/2022  RENAL ULTRASOUND COMPARISON: 1/3/2022. FINDINGS: Examination is significantly limited due to patient positioning and immobility. Increased renal cortical echogenicity is noted bilaterally, compatible with medical renal disease. Multiple shadowing calculi are noted within the left kidney. For example there is a 2.7 cm stone in the left kidney on image 41. Left-sided nephrostomy tube is partially visualized. Mild caliectasis is noted in the left kidney. Left renal pelvis is nondilated. Nonspecific perinephric or subcapsular fluid is noted adjacent to the left kidney, measuring 5.3 x 2.1 x 1.4 cm on image 49. Urinary bladder contains a Padilla catheter. 1. Significantly limited exam. 2. Increased renal cortical echogenicity bilaterally, compatible with medical renal disease. 3. A left-sided nephrostomy tube is partially visualized. 4. Multiple left-sided renal calculi. 5. Mild left-sided caliectasis. The left renal pelvis is nondilated. 6. Nonspecific perinephric or subcapsular fluid is noted adjacent to the left kidney, measuring 5.3 x 2.1 x 1.4 cm.  This document has been electronically signed by: Pancho Ng M.D. on 04/12/2022 02:30 AM      Electronically signed by Alix Hodgkins, DO on 4/22/2022 at 10:46 AM

## 2022-04-22 NOTE — PLAN OF CARE
appropriately. Goal: Absence of physical injury  Description: Absence of physical injury  Outcome: Progressing     Problem: Mood - Altered:  Goal: Verbalizations of feeling emotionally comfortable while being cared for will increase  Description: Verbalizations of feeling emotionally comfortable while being cared for will increase  Outcome: Progressing     Problem: Psychomotor Activity - Altered:  Goal: Absence of psychomotor disturbance signs and symptoms  Description: Absence of psychomotor disturbance signs and symptoms  Outcome: Progressing     Problem: Sensory Perception - Impaired:  Goal: Demonstrations of improved sensory functioning will increase  Description: Demonstrations of improved sensory functioning will increase  Outcome: Progressing  Goal: Decrease in sensory misperception frequency  Description: Decrease in sensory misperception frequency  Outcome: Progressing  Goal: Able to refrain from responding to false sensory perceptions  Description: Able to refrain from responding to false sensory perceptions  Outcome: Progressing  Goal: Demonstrates accurate environmental perceptions  Description: Demonstrates accurate environmental perceptions  Outcome: Progressing  Goal: Able to distinguish between reality-based and nonreality-based thinking  Description: Able to distinguish between reality-based and nonreality-based thinking  Outcome: Progressing  Goal: Able to interrupt nonreality-based thinking  Description: Able to interrupt nonreality-based thinking  Outcome: Progressing     Problem: Sleep Pattern Disturbance:  Goal: Appears well-rested  Description: Appears well-rested  Outcome: Progressing  Note: Patient sleeping through the night. No complaints of feeling fatigues throughout the day. Will continue to monitor.       Problem: Nutrition  Goal: Optimal nutrition therapy  Outcome: Progressing     Problem: DISCHARGE BARRIERS  Goal: Patient's continuum of care needs are met  Outcome: Progressing Problem: Pain:  Goal: Pain level will decrease  Description: Pain level will decrease  Outcome: Progressing  Goal: Control of acute pain  Description: Control of acute pain  Outcome: Progressing  Goal: Control of chronic pain  Description: Control of chronic pain  Outcome: Progressing     Problem: Discharge Planning  Goal: Discharge to home or other facility with appropriate resources  Outcome: Progressing     Problem: Chronic Conditions and Co-morbidities  Goal: Patient's chronic conditions and co-morbidity symptoms are monitored and maintained or improved  Outcome: Progressing     Problem: ABCDS Injury Assessment  Goal: Absence of physical injury  Outcome: Progressing     Problem: Nutrition Deficit:  Goal: Optimize nutritional status  Outcome: Progressing     Care plan reviewed with patient. Patient verbalizes understanding of plan of care and contributes to goal setting.

## 2022-04-22 NOTE — FLOWSHEET NOTE
Stable 4 hour Ultrafiltration only TX completed. Removed 4 liters of fluid as per order. Gave 1 unit of PRBCs with dialysis. pt tolerated well, no adverse reactions noted. Hemodialysis catheter ports flushed, clamped and capped. Dressing clean, dry and intact. Report given to primary RN. Treatment record printed for scanning into EMR.     04/22/22 1425 04/22/22 1835   Vital Signs   /68 (!) 166/75   Temp 98.9 °F (37.2 °C) 98 °F (36.7 °C)   Pulse 77 67   Resp 20 16   SpO2 100 % 100 %   Weight 166 lb 0.1 oz (75.3 kg) 157 lb 3 oz (71.3 kg)   Weight Method Bed scale Bed scale   Post-Hemodialysis Assessment   Post-Treatment Procedures  --  Blood returned;Catheter Capped, clamped with Saline x2 ports   Machine Disinfection Process  --  Acid/Vinegar Clean;Heat Disinfect; Exterior Machine Disinfection   Total Liters Processed (l/min)  --  0 l/min   Dialyzer Clearance  --  Lightly streaked   Duration of Treatment (minutes)  --  240 minutes   Heparin amount administered during treatment (units)  --  0 units   Hemodialysis Intake (ml)  --  600 ml   Hemodialysis Output (ml)  --  4600 ml   NET Removed (ml)  --  4000 ml   Tolerated Treatment  --  Good

## 2022-04-22 NOTE — CARE COORDINATION
4/22/22, 10:45 AM EDT    DISCHARGE PLANNING EVALUATION    SW notified that Patient will need to start hemodialysis. Per RN concerns for Patient to be able to transport by wheelchair. DOT spoke with Hector Mims at [address] and they would not be able to accommodate a stretcher transport to/from dialysis and would need for Patient to be able to transport by wheelchair. PT/OT orders placed to see if Patient is able to transport by wheelchair. SW did discuss with Patient and sister in regards to the potential of discussing an alternative nursing home placement if needed due to transportation concerns.

## 2022-04-22 NOTE — PROGRESS NOTES
This RN flushed nephrostomy tube with 10 mL of normal saline. Fluid return was bloody in color. Patient tolerated well.

## 2022-04-22 NOTE — PROGRESS NOTES
Dressing changed completed by this RN and Estrella Goodwin RN. Packed with gauzed soaked in [place], covered with ABD on coccyx. Dressing around nephrostomy tube changed at this time as well. Site covered with split gauze, covered with gauze and tegaderm. Patient tolerated well.

## 2022-04-22 NOTE — PLAN OF CARE
Problem: Falls - Risk of:  Goal: Will remain free from falls  Description: Will remain free from falls  4/22/2022 1508 by Joselin Villagomez RN  Outcome: Progressing  Call light within reach. Side rails up x2. Bed alarm on. Non skid slippers available. Problem: Skin Integrity:  Goal: Absence of new skin breakdown  Description: Absence of new skin breakdown  4/22/2022 1508 by Joselin Villagomez RN  Outcome: Progressing  Patient free from new skin breakdown. Patient turns self and makes frequent positional changes. Will continue to monitor. Problem: Confusion - Acute:  Goal: Mental status will be restored to baseline  Description: Mental status will be restored to baseline  4/22/2022 1508 by Joselin Villagomez RN  Outcome: Progressing  Patient alert and oriented throughout the shift. Problem: Discharge Planning:  Goal: Participates in care planning  Description: Participates in care planning  4/22/2022 1508 by Joselin Villagomez RN  Outcome: Progressing  Discharge planning in progress. Social work following. Problem: Injury - Risk of, Physical Injury:  Goal: Will remain free from falls  Description: Will remain free from falls  4/22/2022 1508 by Joselin Villagomez RN  Outcome: Progressing  Call light within reach. Side rails up x2. Bed alarm on. Non skid slippers available. Problem: Nutrition  Goal: Optimal nutrition therapy  4/22/2022 1508 by Joselin Villagomez RN  Outcome: Progressing  Dietician following. Increased 6p-6a tube feeds to 55ml/hr. Problem: Pain:  Goal: Pain level will decrease  Description: Pain level will decrease  4/22/2022 1508 by Joselin Villagomez RN  Outcome: Progressing  Patient free from pain this shift. Pain rated on 0-10 pain rating scale. Will continue to reassess. Care plan reviewed with patient and family. Patient and family verbalize understanding of the plan of care and contribute to goal setting.

## 2022-04-22 NOTE — PROGRESS NOTES
[id number] Dr Branden Dumont informed of pt's labs. OK to proceed with procedure of temp to tunneled dialysis catheter.

## 2022-04-22 NOTE — CARE COORDINATION
4/22/22, 12:12 PM EDT    DISCHARGE ON GOING EVALUATION    Luda Jiang day: 10  [facility] Reason for admit: ALVIN (acute kidney injury) ([facility] [address] [N17.9]  Acute renal failure superimposed on chronic kidney disease, unspecified CKD stage, unspecified acute renal failure type ([facility] [address] [N17.9, N18.9]   Procedure: 4/15 temp HD cath  Barriers to Discharge:  Hgb 6.9, unit of PRBCs ordered. Creat 2.4, HD today. Tunneled HD cath to be placed at 1400. FEES study has not been completed as Kolton Gallagher is NPO for IR, ST will check back tomorrow. PT/OT ordered to work with getting Kolton Gallagher from bed to wheelchair. She must be able to get into wheelchair for dialysis transport, and able to sit in a reclining chair for OP HD. Updated Juan Cohen at [facility], he will touch base on Monday but states [facility] does have a spot available for Kolton Gallagher as long as she is able to sit in reclining chair. PCP: Kristen Yeager MD  Readmission Risk Score: 26.6 ( )%  Patient Goals/Plan/Treatment Preferences: Return to [place] [id number]. Await chair time for new HD at [address] [place] [address].

## 2022-04-22 NOTE — PROGRESS NOTES
[facility]  SPEECH THERAPY MISSED TREATMENT NOTE  [facility] RENAL TELEMETRY 6K      Date: 2022  Patient Name: Suze Gomez        MRN: [id number]    : 1950  (73 y.o.)    REASON FOR MISSED TREATMENT:  ST orders for FEES. Spoke with RN, Ketan Maker who reports patient with plans for placement of dialysis catheter this date at 0 and needs to remain NPO. Per chart review and discussion with nursing, patient with intermittent episodes of nausea/vomitting/choking. Nursing reports this happens hours AFTER a meal. Will continue to monitor. May want to consider GI consult. CXR completed on . See findings below:   Impression   1. Left retrocardiac opacity may relate to infiltrates versus atelectasis.       2. Increased interstitial markings in both lungs with increased groundglass opacification of both lungs may reflect underlying pulmonary edema. Clinical correlation is recommended.       3. Small bilateral pleural effusions. Will check back tomorrow,  to determine patient appropriateness for FEES completion.      Coleen Hernández M.S. [place] 2022

## 2022-04-22 NOTE — PLAN OF CARE
Problem: Nutrition  Goal: Optimal nutrition therapy  4/22/2022 1206 by Natalia Dugan RD, LD  Outcome: Not Progressing   Nutrition Problem #1: Inadequate oral intake  Intervention: Food and/or Nutrient Delivery: Continue Current Diet,Continue Oral Nutrition Supplement,Modify Tube Feeding (Nocturnal tubefeeding increased to better meet estimated nutrient needs d/t poor oral intake)  Nutritional Goals: Patient will receive 75% or more of estimated nutrient needs via diet and EN during LOS.

## 2022-04-23 LAB
ANION GAP SERPL CALCULATED.3IONS-SCNC: 12 MEQ/L (ref 8–16)
BASOPHILS # BLD: 0.3 %
BASOPHILS ABSOLUTE: 0 THOU/MM3 (ref 0–0.1)
BUN BLDV-MCNC: 49 MG/DL (ref 7–22)
CALCIUM SERPL-MCNC: 8.5 MG/DL (ref 8.5–10.5)
CHLORIDE BLD-SCNC: 98 MEQ/L (ref 98–111)
CO2: 24 MEQ/L (ref 23–33)
CREAT SERPL-MCNC: 2.9 MG/DL (ref 0.4–1.2)
EOSINOPHIL # BLD: 3.5 %
EOSINOPHILS ABSOLUTE: 0.4 THOU/MM3 (ref 0–0.4)
ERYTHROCYTE [DISTWIDTH] IN BLOOD BY AUTOMATED COUNT: 15.7 % (ref 11.5–14.5)
ERYTHROCYTE [DISTWIDTH] IN BLOOD BY AUTOMATED COUNT: 55.9 FL (ref 35–45)
GFR SERPL CREATININE-BSD FRML MDRD: 16 ML/MIN/1.73M2
GLUCOSE BLD-MCNC: 106 MG/DL (ref 70–108)
GLUCOSE BLD-MCNC: 108 MG/DL (ref 70–108)
GLUCOSE BLD-MCNC: 110 MG/DL (ref 70–108)
GLUCOSE BLD-MCNC: 120 MG/DL (ref 70–108)
GLUCOSE BLD-MCNC: 97 MG/DL (ref 70–108)
HAV IGM SER IA-ACNC: NEGATIVE
HCT VFR BLD CALC: 25.1 % (ref 37–47)
HEMOGLOBIN: 7.5 GM/DL (ref 12–16)
HEPATITIS B CORE IGM ANTIBODY: NEGATIVE
HEPATITIS B SURFACE ANTIGEN: NEGATIVE
HEPATITIS C ANTIBODY: NEGATIVE
IMMATURE GRANS (ABS): 0.07 THOU/MM3 (ref 0–0.07)
IMMATURE GRANULOCYTES: 0.5 %
LYMPHOCYTES # BLD: 5.3 %
LYMPHOCYTES ABSOLUTE: 0.7 THOU/MM3 (ref 1–4.8)
MCH RBC QN AUTO: 29.2 PG (ref 26–33)
MCHC RBC AUTO-ENTMCNC: 29.9 GM/DL (ref 32.2–35.5)
MCV RBC AUTO: 97.7 FL (ref 81–99)
MONOCYTES # BLD: 9.5 %
MONOCYTES ABSOLUTE: 1.2 THOU/MM3 (ref 0.4–1.3)
NUCLEATED RED BLOOD CELLS: 0 /100 WBC
PLATELET # BLD: 173 THOU/MM3 (ref 130–400)
PMV BLD AUTO: 9 FL (ref 9.4–12.4)
POTASSIUM SERPL-SCNC: 4.2 MEQ/L (ref 3.5–5.2)
RBC # BLD: 2.57 MILL/MM3 (ref 4.2–5.4)
SEG NEUTROPHILS: 80.9 %
SEGMENTED NEUTROPHILS ABSOLUTE COUNT: 10.3 THOU/MM3 (ref 1.8–7.7)
SODIUM BLD-SCNC: 134 MEQ/L (ref 135–145)
WBC # BLD: 12.7 THOU/MM3 (ref 4.8–10.8)

## 2022-04-23 PROCEDURE — 36415 COLL VENOUS BLD VENIPUNCTURE: CPT

## 2022-04-23 PROCEDURE — 2580000003 HC RX 258: Performed by: STUDENT IN AN ORGANIZED HEALTH CARE EDUCATION/TRAINING PROGRAM

## 2022-04-23 PROCEDURE — 92612 ENDOSCOPY SWALLOW (FEES) VID: CPT

## 2022-04-23 PROCEDURE — 92526 ORAL FUNCTION THERAPY: CPT

## 2022-04-23 PROCEDURE — 6370000000 HC RX 637 (ALT 250 FOR IP): Performed by: PHYSICIAN ASSISTANT

## 2022-04-23 PROCEDURE — 6360000002 HC RX W HCPCS: Performed by: PHARMACIST

## 2022-04-23 PROCEDURE — 80048 BASIC METABOLIC PNL TOTAL CA: CPT

## 2022-04-23 PROCEDURE — 1200000000 HC SEMI PRIVATE

## 2022-04-23 PROCEDURE — 82948 REAGENT STRIP/BLOOD GLUCOSE: CPT

## 2022-04-23 PROCEDURE — 99232 SBSQ HOSP IP/OBS MODERATE 35: CPT | Performed by: INTERNAL MEDICINE

## 2022-04-23 PROCEDURE — 85025 COMPLETE CBC W/AUTO DIFF WBC: CPT

## 2022-04-23 PROCEDURE — 6370000000 HC RX 637 (ALT 250 FOR IP): Performed by: STUDENT IN AN ORGANIZED HEALTH CARE EDUCATION/TRAINING PROGRAM

## 2022-04-23 PROCEDURE — 99233 SBSQ HOSP IP/OBS HIGH 50: CPT | Performed by: INTERNAL MEDICINE

## 2022-04-23 RX ADMIN — GUAIFENESIN 600 MG: 600 TABLET, EXTENDED RELEASE ORAL at 09:13

## 2022-04-23 RX ADMIN — SODIUM CHLORIDE, PRESERVATIVE FREE 10 ML: 5 INJECTION INTRAVENOUS at 20:42

## 2022-04-23 RX ADMIN — FAMOTIDINE 20 MG: 20 TABLET ORAL at 09:13

## 2022-04-23 RX ADMIN — CIPROFLOXACIN 400 MG: 2 INJECTION, SOLUTION INTRAVENOUS at 21:00

## 2022-04-23 RX ADMIN — FLUOXETINE 40 MG: 20 CAPSULE ORAL at 09:13

## 2022-04-23 RX ADMIN — MICONAZOLE NITRATE: 20 POWDER TOPICAL at 20:43

## 2022-04-23 RX ADMIN — MICONAZOLE NITRATE: 20 POWDER TOPICAL at 09:13

## 2022-04-23 RX ADMIN — METOPROLOL TARTRATE 25 MG: 25 TABLET, FILM COATED ORAL at 20:43

## 2022-04-23 RX ADMIN — METOPROLOL TARTRATE 25 MG: 25 TABLET, FILM COATED ORAL at 09:13

## 2022-04-23 RX ADMIN — AMOXICILLIN 250 MG: 250 CAPSULE ORAL at 15:59

## 2022-04-23 RX ADMIN — GUAIFENESIN 600 MG: 600 TABLET, EXTENDED RELEASE ORAL at 20:43

## 2022-04-23 RX ADMIN — SODIUM CHLORIDE, PRESERVATIVE FREE 10 ML: 5 INJECTION INTRAVENOUS at 09:12

## 2022-04-23 RX ADMIN — DAKIN'S SOLUTION 0.125% (QUARTER STRENGTH): 0.12 SOLUTION at 09:13

## 2022-04-23 ASSESSMENT — PAIN SCALES - GENERAL
PAINLEVEL_OUTOF10: 0

## 2022-04-23 NOTE — PROGRESS NOTES
Hospitalist Progress Note      Patient:  Stefany Real    Unit/Bed:6K-18/018-A  YOB: 1950  MRN: [id number]   Acct: [de-identified]   PCP: Stormy Gudino MD  Date of Admission: 4/11/2022    Assessment/Plan:     ALVIN on CKD stage III, improving: unclear etiology but some volume depletion contributing in additional to diuretic use per Nephro who has been consulted. Renal US without acute abnormalities. Aggressive IVFs. Avoid nephrotoxic agents / contrast. Renal US without acute abnormalities. S/p temporary dialysis catheter and RRT. 4/19: Nephro plans potential tunneled catheter tomorrow, no anticoagulation. Continue with IVF for now. Monitor labs in am for final decision, appreciate Nephro. Acute hypoxic respiratory failure: on 1 L NC with SpO2 96%. Encourage IS / Acapella. CXR shows LLL infiltrate vs atelectasis. Suspect atelectasis with pt's presentation. Wean O2 as tolerated. Hypomagnesemia, resolved: replaced      Hyperkalemia, resolved: Nephrology following. Check daily BMP. Dislodged L nephrostomy tube s/p nephrostogram, resolved: no indication of dislodgement on CT A/P, IR has been consulted for assessment. Previously dislodged and replaced multiple times, last being 3/14/22. L staghorn calculus / bleeding L nephrostomy tube: Urology has been consulted. Repeat CT A/P from 4/12 shows L sided perinephric stranding and fluid collection adjacent to tube which is felt to likely be subcapsular hematoma. Acute cystitis: chronic seaman catheter, UA shows few bacteria, moderate leukocyte esterase and 50-75 with clumps WBC. Urine culture returned growing nonfermenting gram-negative bacilli, enteric gram-negative bacilli, and gram-positive cocci. Pt denies any burning at the site of her catheter. Will discuss with Urology if treatment is indicated at this time. Afebrile, no leukocytosis.     4/18: urine gx returned growing Pseudomonas or [place], Klebsiella pneumoniae and Enterococcus faecalis group D. Continue Cipro and add PO amoxicillin per ID (complete 7 days)     Acute on chronic normocytic anemia: Hg 7.5 on admission with baseline Hg 8.0s-9.0s. Mild-moderate dysphagia: seen by SLP, underwent MBS. Recommended honey thick liquids, no straws, and soft and bite sized. Metabolic acidosis: on sodium bicarbonate per Nephro, continue to monitor. Will correct with HD. Resolved. Elevated troponin: likely related to CKD, denies CP, EKG showed sinus tach with nonspecific ST/T wave change. Chronic HFpEF without decompensation: TOBIAS 10/2021 EF 60% with trace TR. On Bumex 0.5 mg daily which is held due to ALVIN and dehydration     Essential HTN:slightly elevated, continue with home meds and monitor closely      Hx stage III decub ulcer s/p diverting colostomy 1/27/22: Wound care consulted and following      Anxiety: Continue home medications      ED work-up: Afebrile with BP elevate. BMP show hypokalemia with potassium 5.4, serum osmolarity elevate 322.1. EKG showed sinus tachycardia with PVC, St & T wave abnormality. Patient is asymptomatic for chest pain. CBC showed anemia with hemoglobin 7.5 which reduced compared to her baseline at 8 on 9. \"    4/13: pt doing okay, sitting up in bed with family at bedside. Denies fever/chills/CP/SOB. Still has dark blood noted in her nephrostomy bag. Denies any burning at the site of her catheter. 4/14: pt seen having returned from [facility] today and was noted to have episode of nausea/choking / coughing and a small amount of whitish emesis (barium). Pt denied abd pain and denied any SOB at that time. 4/15: pt lying in bed, no complaints. Still with blood in her nephrostomy bag. Denies SOB/cough. No fever/chills. No CP. A&Ox4.     4/16: pt doing fine, lying in bed. Offers no new complaints. Had HD today. Still with blood in her nephrostomy bag. Hgb remains stable.     4/17: pt continues to have these choking like episodes where it appears she cannot breathe. She denies any sort of dysphagia or choking sensation, but notes that she feels nauseated, like \"dry heaving\". No fever/chills. Kidney function improving. Seems in brighter spirits today. 4/18: pt reports she is doing okay. No CP/SOB, working with her acapella. Still has blood in her nephrostomy bag. No fever/chills. 4/19: no new complaints, discussed with nurse. Unable to wean off the 1L NC as pt drops into the 80's. Encouraged to continue with incentive spirometry. Hold ASA / anticoagulation pending possible tunneled cath tomorrow. 4.20.2022 Patient seen this a.m. no complaints discussed the possible need for hemodialysis. On amoxicillin and ciprofloxacin for urinary tract infection. 4.21.2022 patient seen this a.m. no complaints resting comfortably. Creatinine 4.0, CBC is pending, will transfuse if okay with renal.  Tunneled dialysis catheter to be placed, marginal urine output minimal response to diuretics, anticoagulation and antiplatelets are on hold. 4.22.2022 patient seen this a.m. resting comfortably we will transfuse 1 unit of packed RBCs, Tunnel dialysis catheter to be placed today. Creatinine 2.4 today, GFR 20    4.23.2022 patient seen this a.m., was transfused 1 unit of blood during dialysis, recheck after dialysis hemoglobin was 8.5 today its 7.5. Urology is following. Tunneled dialysis catheter was placed yesterday without complications    Chief Complaint: Abnormal lab    Initial H and P:-    \"Kenzie Almanzar is a 73 y.o., , female presented to [facility] ED for abnormal lab from nursing home. Past medical history significant with chronic diastolic heart failure, obstructive staghorn calculus of the left kidney not a candidate for stone treatment which nephrostomy tube was placed, stage III decubitus ulcer, CKD stage III, general anxiety disorder. Visit patient awake alert and oriented to time person and place. Patient denies any chest pain, chest discomfort, abdominal pain, back pain, neck pain. Patient sister on the bedside report that patient had been having bright red blood in nephrostomy tube over last 3 days. Today patient nursing home notify abnormal lab work which patient was transferred to [facility] ED.     Patient is chronic critical ill with PEG tube for nutritional, divers colostomy bag for stage III decubitus ulcer, nephrostomy tube drain bright red blood. Patient living in nursing home, denies alcohol use, tobacco use.     Patient was admitted and managed for ALVIN secondary to dehydration and possible obstruction.        Past medical history, family history, social history and allergies reviewed again and is unchanged since admission. ROS (All review of systems completed. Pertinent positives noted.  Otherwise All other systems reviewed and negative.)     Medications:  Reviewed    Infusion Medications    sodium chloride      dextrose      sodium chloride Stopped (04/16/22 0220)    sodium chloride       Scheduled Medications    metoprolol tartrate  25 mg Oral BID    ciprofloxacin  400 mg IntraVENous Q24H    guaiFENesin  600 mg Oral BID    amoxicillin  250 mg Oral Q24H    sodium hypochlorite   Irrigation Daily    silver nitrate applicators  1 each Topical Once    miconazole   Topical BID    [Held by provider] aspirin  81 mg Oral Daily    [Held by provider] bumetanide  0.5 mg Oral Daily    famotidine  20 mg PEG Tube Every Other Day    ferrous sulfate  325 mg Oral Every Other Day    FLUoxetine  40 mg Oral Daily    sodium chloride flush  5-40 mL IntraVENous 2 times per day     PRN Meds: sodium chloride, ondansetron, ipratropium-albuterol, glucagon (rDNA), dextrose, glucose, dextrose bolus (hypoglycemia) **OR** dextrose bolus (hypoglycemia), acetaminophen **OR** acetaminophen, melatonin, sodium chloride flush, sodium chloride, sodium chloride      Intake/Output Summary (Last 24 hours) at 4/23/2022 1002  Last data filed at 4/23/2022 0546  Gross per 24 hour   Intake 2290 ml   Output 5375 ml   Net -3085 ml       Diet:  ADULT DIET; Dysphagia - Soft and Bite Sized; Moderately Thick (Honey); No Drinking Straws  ADULT ORAL NUTRITION SUPPLEMENT; Breakfast, Lunch, Dinner; Frozen Oral Supplement  ADULT TUBE FEEDING; PEG; Renal Formula; Cyclic; 55; 8:62 PM; 5:72 AM; 30; Other (specify); 30 mls before and after cyclic feeding    Exam:  /73   Pulse 85   Temp 98.2 °F (36.8 °C) (Oral)   Resp 16   Ht 4' 9\" (1.448 m)   Wt 157 lb 3 oz (71.3 kg)   SpO2 93%   BMI 34.02 kg/m²   General appearance: chronically ill appearing, no apparent distress, appears stated age and cooperative. HEENT: Pupils equal, round, and reactive to light. Conjunctivae/corneas clear. Neck: Supple, with full range of motion. No jugular venous distention. Trachea midline. Respiratory:  Normal respiratory effort. Clear to auscultation, bilateral crackles in bases  Cardiovascular: Regular rate and rhythm with normal S1/S2 without murmurs, rubs or gallops. Abdomen: Soft, non-tender, non-distended with normal bowel sounds. L side nephrostomy tube with blood in bag  Musculoskeletal: passive and active ROM x 4 extremities. Positive for edema  Skin: Skin color, texture, turgor normal.  No rashes or lesions. Neurologic:  Neurovascularly intact without any focal sensory/motor deficits. Cranial nerves: II-XII intact, grossly non-focal.  Psychiatric: Alert and oriented x4, thought content appropriate, normal insight  Capillary Refill: Brisk,< 3 seconds   Peripheral Pulses: +2 palpable, equal bilaterally     Labs:   Recent Labs     04/21/22  1526 04/21/22  1526 04/22/22  0512 04/22/22  1951 04/23/22  0450   WBC 18.2*  --  12.9*  --  12.7*   HGB 7.5*   < > 6.9* 8.5* 7.5*   HCT 25.4*   < > 23.6* 28.9* 25.1*     --  184  --  173    < > = values in this interval not displayed.      Recent Labs     04/21/22  0406 04/22/22  0512 04/23/22  0450   NA 133* 137 134*   K 4.3 4.0 4.2   CL 97* 100 98   CO2 24 26 24   BUN 70* 37* 49*   CREATININE 4.0* 2.4* 2.9*   CALCIUM 8.1* 8.1* 8.5     No results for input(s): AST, ALT, BILIDIR, BILITOT, ALKPHOS in the last 72 hours. No results for input(s): INR in the last 72 hours. No results for input(s): Blaire Highman in the last 72 hours. Microbiology:    Blood culture #1:   Lab Results   Component Value Date    BC No growth-preliminary No growth  01/21/2022       Blood culture #2:No results found for: Ora Lucio    Organism:  Lab Results   Component Value Date    ORG Pseudomonas aeruginosa 04/12/2022    ORG Klebsiella pneumoniae 04/12/2022    ORG Enterococcus faecalis - (Group D) 04/12/2022         Lab Results   Component Value Date    LABGRAM  02/22/2022     Many segmented neutrophils observed. No epithelial cells observed. No bacteria seen. MRSA culture only:No results found for: [facility]    Urine culture:   Lab Results   Component Value Date    LABURIN No growth-preliminary  01/20/2022    LABURIN [place] count: 1,000 CFU/mL  01/20/2022       Respiratory culture: No results found for: CULTRESP    Aerobic and Anaerobic :  Lab Results   Component Value Date    LABAERO  02/22/2022     No growth-preliminary Current antibiotic therapy ineffective in vitro for at least one of culture isolates. LABAERO light growth  02/22/2022    LABAERO  02/22/2022     very light growth Isolates of Methicillin Resistant Staphylococcus coagulase negative (MRSE) do NOT require CONTACT isolation. Methicillin(Oxacillin)resistant strains of staphylococci (MRSA)or(MRSE)should be considered resistant to all classes of cephalosporins, penems and beta-lactams.       Lab Results   Component Value Date    LABANAE  02/22/2022     No anaerobes isolated- preliminary No anaerobes isolated        Urinalysis:      Lab Results   Component Value Date    NITRU NEGATIVE 04/12/2022    WBCUA 50-75W/CLUMPS 04/12/2022    BACTERIA FEW 04/12/2022    RBCUA > post successful nontunneled dialysis catheter insertion. **This report has been created using voice recognition software. It may contain minor errors which are inherent in voice recognition technology. **      Final report electronically signed by Dr Oliverio Reddy on 4/15/2022 12:06 PM      XR CHEST PORTABLE   Final Result   1. Left lower lung atelectasis/infiltrate. 2. Mild stable cardiomegaly. **This report has been created using voice recognition software. It may contain minor errors which are inherent in voice recognition technology. **      Final report electronically signed by Dr. Jorge Narvaez on 4/14/2022 11:54 AM      FL MODIFIED BARIUM SWALLOW W VIDEO   Final Result   Laryngeal penetration and aspiration with thin consistency. Laryngeal penetration with nectar thick. Recommendations available from speech therapy            **This report has been created using voice recognition software. It may contain minor errors which are inherent in voice recognition technology. **      Final report electronically signed by Dr. Kaela Saravia on 4/14/2022 12:39 PM      IR GUIDED NEPHROSTOGRAM/URETEROGRAM EXISTING ACCESS   Final Result   The tip of the nephrostomy tube is coiled in the left renal collecting system. **This report has been created using voice recognition software. It may contain minor errors which are inherent in voice recognition technology. **         Final report electronically signed by Dr Oliverio Reddy on 4/14/2022 9:08 AM      CT ABDOMEN PELVIS WO CONTRAST Additional Contrast? None   Final Result   1. Small bilateral pleural effusions with adjacent atelectasis/infiltrate. 2. Left-sided perinephric stranding and fluid collection adjacent to a left-sided nephrostomy tube, likely a subcapsular hematoma. Stable calcifications in the left kidney. 3. Cholelithiasis. 4. Sacral decubitus ulcer.       Final report electronically signed by Dr. Jorge Narvaez on 4/12/2022 5:00 PM      US RENAL COMPLETE   Final Result   1. Significantly limited exam.   2. Increased renal cortical echogenicity bilaterally, compatible with    medical renal disease. 3. A left-sided nephrostomy tube is partially visualized. 4. Multiple left-sided renal calculi. 5. Mild left-sided caliectasis. The left renal pelvis is nondilated. 6. Nonspecific perinephric or subcapsular fluid is noted adjacent to the    left kidney, measuring 5.3 x 2.1 x 1.4 cm. This document has been electronically signed by: Mariann Jarvis M.D. on    04/12/2022 02:30 AM        CT ABDOMEN PELVIS WO CONTRAST Additional Contrast? None    Result Date: 4/12/2022  PROCEDURE: CT ABDOMEN PELVIS WO CONTRAST CLINICAL INFORMATION: Left staghorn calculus TECHNIQUE: CT of the abdomen and pelvis was performed without use of intravenous contrast. Axial images as well as sagittal and coronal reconstructions were obtained. All CT scans at this facility use dose modulation, iterative reconstruction, and/or weight-based dosing when appropriate to reduce radiation dose to as low as reasonably achievable. COMPARISON: CT abdomen and pelvis 3/13/2022 FINDINGS: Lower thorax: There are small bilateral pleural effusions. There is adjacent lung consolidation are present. The heart is enlarged. Abdomen: Evaluation is limited due to absence of contrast. There is no free intraperitoneal air. A gastrostomy tube is in the stomach. A left mid abdominal ostomy is stable and contains loops of bowel. There is no bowel obstruction. Calcifications are present in the lumen of the gallbladder. A nephrostomy tube is now present in the left kidney. Fluid adjacent to the tube and kidney has noncontrast mean Hounsfield units of 12 (image 34). Subcapsular fluid measures up to 2.2 cm in width (image 38). Perinephric stranding is again noted. Calcifications in the left kidney are stable. Left-sided hydronephrosis is not significantly changed.  Hypoattenuating lesions in the kidneys may be cysts but are incompletely characterized on the current study. There  are calcified granulomas in the liver and spleen. Atherosclerotic calcifications are present in the abdominal aorta without evidence of aneurysm. There is no mesenteric or retroperitoneal lymphadenopathy. Degenerative changes are seen in the thoracolumbar spine without evidence of aggressive osseous lesions. Pelvis: There is a Padilla catheter in a nondistended urinary bladder, likely accounting for gas in the bladder. Platelets are present in the pelvis. There are calcifications in the uterus. There is no pelvic or inguinal lymphadenopathy. There is persistent subcutaneous tissue irregularity posterior to the sacrum. Degenerative changes are present in the pelvis without evidence of aggressive osseous lesions. 1. Small bilateral pleural effusions with adjacent atelectasis/infiltrate. 2. Left-sided perinephric stranding and fluid collection adjacent to a left-sided nephrostomy tube, likely a subcapsular hematoma. Stable calcifications in the left kidney. 3. Cholelithiasis. 4. Sacral decubitus ulcer. Final report electronically signed by Dr. Winifred Maguire on 4/12/2022 5:00 PM    US RENAL COMPLETE    Result Date: 4/12/2022  RENAL ULTRASOUND COMPARISON: 1/3/2022. FINDINGS: Examination is significantly limited due to patient positioning and immobility. Increased renal cortical echogenicity is noted bilaterally, compatible with medical renal disease. Multiple shadowing calculi are noted within the left kidney. For example there is a 2.7 cm stone in the left kidney on image 41. Left-sided nephrostomy tube is partially visualized. Mild caliectasis is noted in the left kidney. Left renal pelvis is nondilated. Nonspecific perinephric or subcapsular fluid is noted adjacent to the left kidney, measuring 5.3 x 2.1 x 1.4 cm on image 49. Urinary bladder contains a Padilla catheter.      1. Significantly limited exam. 2. Increased renal cortical echogenicity bilaterally, compatible with medical renal disease. 3. A left-sided nephrostomy tube is partially visualized. 4. Multiple left-sided renal calculi. 5. Mild left-sided caliectasis. The left renal pelvis is nondilated. 6. Nonspecific perinephric or subcapsular fluid is noted adjacent to the left kidney, measuring 5.3 x 2.1 x 1.4 cm.  This document has been electronically signed by: Esperanza Olsen M.D. on 04/12/2022 02:30 AM      Electronically signed by North Camacho DO on 4/23/2022 at 10:02 AM

## 2022-04-23 NOTE — PLAN OF CARE
Problem: Falls - Risk of:  Goal: Will remain free from falls  Description: Will remain free from falls  4/23/2022 0442 by Thais Frazier RN  Outcome: Progressing  4/22/2022 1508 by Lawrence Dave RN  Outcome: Progressing  Goal: Absence of physical injury  Description: Absence of physical injury  4/23/2022 0442 by Thais Frazier RN  Outcome: Progressing  4/22/2022 1508 by Lawrence Dave RN  Outcome: Progressing     Problem: Skin Integrity:  Goal: Will show no infection signs and symptoms  Description: Will show no infection signs and symptoms  4/23/2022 0442 by Thais Frazier RN  Outcome: Progressing  4/22/2022 1508 by Lawrence Dave RN  Outcome: Progressing  Goal: Absence of new skin breakdown  Description: Absence of new skin breakdown  4/23/2022 0442 by Thais Frazier RN  Outcome: Progressing  4/22/2022 1508 by Lawrence Dave RN  Outcome: Progressing     Problem: Confusion - Acute:  Goal: Absence of continued neurological deterioration signs and symptoms  Description: Absence of continued neurological deterioration signs and symptoms  4/23/2022 0442 by Thais Frazier RN  Outcome: Progressing  4/22/2022 1508 by Lawrence Dave RN  Outcome: Progressing  Goal: Mental status will be restored to baseline  Description: Mental status will be restored to baseline  4/23/2022 0442 by Thais Frazier RN  Outcome: Progressing  4/22/2022 1508 by Lawrence Dave RN  Outcome: Progressing     Problem: Discharge Planning:  Goal: Ability to perform activities of daily living will improve  Description: Ability to perform activities of daily living will improve  4/23/2022 0442 by Thais Frazier RN  Outcome: Progressing  4/22/2022 1508 by Lawrence Dave RN  Outcome: Progressing  Goal: Participates in care planning  Description: Participates in care planning  4/23/2022 0442 by Thais Frazier RN  Outcome: Progressing  4/22/2022 1508 by Lawrence Dave RN  Outcome: Progressing     Problem: Injury - Risk of, Physical Injury:  Goal: Will remain free from falls  Description: Will remain free from falls  4/23/2022 0442 by Nat Anrold RN  Outcome: Progressing  4/22/2022 1508 by Jose Ellis RN  Outcome: Progressing  Goal: Absence of physical injury  Description: Absence of physical injury  4/23/2022 0442 by Nat Arnold RN  Outcome: Progressing  4/22/2022 1508 by Jose Ellis RN  Outcome: Progressing     Problem: Mood - Altered:  Goal: Verbalizations of feeling emotionally comfortable while being cared for will increase  Description: Verbalizations of feeling emotionally comfortable while being cared for will increase  4/23/2022 0442 by Nat Arnold RN  Outcome: Progressing  4/22/2022 1508 by Jose Ellis RN  Outcome: Progressing     Problem: Psychomotor Activity - Altered:  Goal: Absence of psychomotor disturbance signs and symptoms  Description: Absence of psychomotor disturbance signs and symptoms  4/23/2022 0442 by Nat Arnold RN  Outcome: Progressing  4/22/2022 1508 by Jose Ellis RN  Outcome: Progressing     Problem: Sensory Perception - Impaired:  Goal: Demonstrations of improved sensory functioning will increase  Description: Demonstrations of improved sensory functioning will increase  4/23/2022 0442 by Nat Arnold RN  Outcome: Progressing  4/22/2022 1508 by Jose Ellis RN  Outcome: Progressing  Goal: Decrease in sensory misperception frequency  Description: Decrease in sensory misperception frequency  4/23/2022 0442 by Nat Arnold RN  Outcome: Progressing  4/22/2022 1508 by Jose Ellis RN  Outcome: Progressing  Goal: Able to refrain from responding to false sensory perceptions  Description: Able to refrain from responding to false sensory perceptions  4/23/2022 0442 by Nat Arnold RN  Outcome: Progressing  4/22/2022 1508 by Jose Ellis RN  Outcome: Progressing  Goal: Demonstrates accurate environmental perceptions  Description: Demonstrates accurate environmental perceptions  4/23/2022 0442 by AFTAB Arabella Beasley RN  Outcome: Progressing  4/22/2022 1508 by Monty Jorgensen RN  Outcome: Progressing  Goal: Able to distinguish between reality-based and nonreality-based thinking  Description: Able to distinguish between reality-based and nonreality-based thinking  4/23/2022 0442 by Wilfredo Nowak RN  Outcome: Progressing  4/22/2022 1508 by Monty Jorgensen RN  Outcome: Progressing  Goal: Able to interrupt nonreality-based thinking  Description: Able to interrupt nonreality-based thinking  4/23/2022 0442 by Wilfredo Nowak RN  Outcome: Progressing  4/22/2022 1508 by Monty Jorgensen RN  Outcome: Progressing     Problem: Sleep Pattern Disturbance:  Goal: Appears well-rested  Description: Appears well-rested  4/23/2022 0442 by Wilfredo Nowak RN  Outcome: Progressing  4/22/2022 1508 by Monty Jorgensen RN  Outcome: Progressing     Problem: Nutrition  Goal: Optimal nutrition therapy  4/23/2022 0442 by Wilfredo Nowak RN  Outcome: Progressing  4/22/2022 1508 by Monty Jorgensen RN  Outcome: Progressing     Problem: Pain:  Description: Pain management should include both nonpharmacologic and pharmacologic interventions.   Goal: Pain level will decrease  Description: Pain level will decrease  4/23/2022 0442 by Wilfredo Nowak RN  Outcome: Progressing  4/22/2022 1508 by Monty Jorgensen RN  Outcome: Progressing  Goal: Control of acute pain  Description: Control of acute pain  4/23/2022 0442 by Wilfredo Nowak RN  Outcome: Progressing  4/22/2022 1508 by Monty Jorgensen RN  Outcome: Progressing  Goal: Control of chronic pain  Description: Control of chronic pain  4/23/2022 0442 by Wilfredo Nowak RN  Outcome: Progressing  4/22/2022 1508 by Monty Jorgensen RN  Outcome: Progressing     Problem: ABCDS Injury Assessment  Goal: Absence of physical injury  4/23/2022 0442 by Wilfredo Nowak RN  Outcome: Progressing  4/22/2022 1508 by Monty Jorgensen RN  Outcome: Progressing     Problem: Nutrition Deficit:  Goal: Optimize nutritional status  4/23/2022 0442 by Prateek Boswell RN  Outcome: Progressing  4/22/2022 1508 by Montse Woods RN  Outcome: Progressing     Problem: Chronic Conditions and Co-morbidities  Goal: Patient's chronic conditions and co-morbidity symptoms are monitored and maintained or improved  4/23/2022 0442 by Prateek Boswell RN  Outcome: Progressing  4/22/2022 1508 by Montse Woods RN  Outcome: Progressing

## 2022-04-23 NOTE — PROGRESS NOTES
[facility]  Fiberoptic Endoscopic Evaluation of Swallowing + Dysphagia therapy  STRZ RENAL TELEMETRY 6K      SLP Individual Minutes  Time In: 8111  Time Out: 3769  Minutes: 25  Timed Code Treatment Minutes: 0 Minutes   FEES: 17 minutes   Dysphagia therapy: 8 minutes        Date: 2022  Patient Name: Quiana Vergara       CSN: [id number]   : 1950  (73 y.o.)  Gender: female   Referring Physician: Isaías Shaw DO  Diagnosis: ALVIN  Date of Last MBS/FEES:  MBS on 22   Diet:  NPO (previously on soft/bite sized diet and moderately thick liquids)     History of Present Illness/Injury: Patient admit to [facility] with above medical dx. Please refer to physician H&P for full patient medical history. Patient with fluctuating swallow performance and concerns for aspiration. ST recommendations for FEES to further assess and determine level of pharyngeal dysfunction. has a past medical history of CHF (congestive heart failure) ([facility] [address] Depression, Gout attack, Hemodialysis patient ([facility] [address] Hypertension, Osteoarthritis, Pulmonary artery hypertension ([facility] [address] Renal calculi, and Thrombocytopenia ([facility] [address]     Reason for Study: Rule out pharyngeal dysfunction  Prior Swallowing Evaluation/Results: See MBS report on  with recommendations for soft/bite sized diet and moderately thick liquids   Pain:  None reported     Current Diet: NPO    Respiratory Status: Nasal Canula    Behavioral Observation:alert and cooperative    Cognition: Exam not limited by cognition, but rather d/t discomfort     ORAL MECHANISM EXAM:  Facial Symmetry WFL    Labial/Facial WFL    Lingual WFL    Oral Mucosa WFL    Mandibular Movement WFL    Sensation Not Tested    Cough/Reflexes Not Tested      FEES PROCEDURE DETAILS:  Procedure performed by: Jamie Lee M.S. CCC-SLP  Feeder/Assistant: Toni Orozco, Speech Student Intern   Scope/Serial Number: Chip Tip - Loner scope (serial #[id number])    Topical Anesthetic Used: No  Patient Positioning: Bed    Procedure explained and education provided to patient including purpose, benefits and risks of testing. Understanding and agreement with testing was verbalized. A flexible fiber-optic nasoendoscope was passed transnasally to view the nasopharynx, oropharynx, hypopharynx, larynx through the right nares without difficuty.     Patient tolerance of procedure: Discomfort significantly limited study    ANATOMIC/PHYSIOLIGIC ASSESSMENT:  Nasal Cavity WFL    Velopharyngeal Port [facility]    Base of Tongue WFL    Lingual Tonsils Not Tested    Vocal Fold Margin - Right WFL    Vocal Fold Margin - Left WFL    Abductory/Adductory Movement [facility]    Ventricular Folds WFL    Pharyngeal Contraction for Pitch Glide Not Tested    Epiglottis   Impaired    Valleculae WFL    Secretions - Appearance N/a    Secretions - Location    N/a    Pyriform Sinus   WFL    Glottal Closure   WFL    Arytenoids WFL    Posterior Commisure WFL      PATIENT WAS EVALUATED USING: Ice Chips, Thin, Mildly Thick and Moderately Thick    SWALLOW OBSERVATION: Premature spillage, Piecemeal deglutition, Incomplete epiglottic inversion, Residue - Vallecular and Residue - Pyriform    LARYNGEAL PENETRATION/ASPIRATION: No evidence of aspiration and Laryngeal penetration evident before and during swallow    PHARYNGEAL PHASE DORIS SCORE (dysphagia outcome and severity score)  4 = Mild-Moderate Dysphagia - One or two consistencies restricted - may exhibit one or more of the following: Residue clears with cue, Aspiration of one consistency with weak or no reflexive cough, Laryngeal penetration to the vocal cords with cough with two consistencies, Laryngeal penetration to the vocal cords without cough on one consistency    PENETRATION-ASPIRATION SCALE (PAS)   Ice Chips: 1 = Material does not enter the airway  Thin Liquids: 3 = Material enters the airway, remains above the vocal folds, and is not ejected from the airway  Mildly Thick Liquids:  3 = Material enters the airway, remains above the vocal folds, and is not ejected from the airway  Moderately Thick Liquids: 2 = Material enters the airway, remains above vocal folds, and is ejected from the airway    ESOPHAGEAL PHASE: No significant findings; however, given patient/nursing reports of nausea/vomiting following consumption would recommend consideration of GI consult     ATTEMPTED TECHNIQUES:  Small Bolus Size Effective and Ineffective *effective for moderately thick liquids    Straw Not Attempted    Cup Effective and Ineffective *effective for moderately thick liquids    Chin Tuck Not Attempted    Head Turn Not Attempted    Spoon Presentations Effective    Volitional Cough Not Attempted    Spontaneous Cough Not Attempted           DIAGNOSTIC IMPRESSIONS:  Patient presents with mild-moderate pharyngeal dysphagia with similar findings from MBS report on 4/14. Demonstration of decreased oral bolus control DESPITE use of oral bolus hold maneuver resulting in premature spillage to the lateral channels/pyriforms. Piecemeal deglutition present. Patient with a delayed swallow and decreased epiglottic inversion. Presence of laryngeal penetration BEFORE/DURING the swallow following consumption of thin liquids by cup and mildly thick liquids by cup with trace stasis remaining above the level of the VF's and did NOT clear from the laryngeal vestibule. Shallow/transient laryngeal penetration of moderately thick liquids by cup. No observed aspiration. Certainly cannot fully r/o aspiration DURING the swallow given obstructed view from epiglottis. Build up of trace-mild residue in the valleculae/lateral channels (increased with denser viscosities). Nearly full clearance with volitional double swallows. Recommendations for resumption of prior diet with repeat instrumental assessment in 2-4 weeks. **consider GI consult (see above).    **Patient refusal for solid intake s/t discomfort    DYSPHAGIA THERAPY: Following FEES, transition to skilled dysphagia therapy for further assessment of solid intake WITHOUT use of scope. Puree: x3- no overt s/s of airway invasion   Crackers: x3- no overt s/s of airway invasion     Completed review and education of the following safe swallowing strategies/precautions--  *Sit upright   *Small bites/drinks  *Alternate solids/liquids  *DOUBLE SWALLOWS    **skilled level education re: GI Consult and rationale with patient in agreement. Diet Recommendations:  Soft/bite sized diet and moderately thick liquids   Strategies:  Full Upright Position, Small Bite/Sip, No Straw, Pulmonary Monitoring, Medication in Applesauce, Alternate Solids and Liquids, Limit Distractions and Monitor for Fatigue       Rehabilitation Potential: good    EDUCATION:  Learner: Patient  Education:  Reviewed results and recommendations of this evaluation, Reviewed diet and strategies and Reviewed ST goals and Plan of Care  Evaluation of Education: Verbalizes understanding, Demonstrates with assistance, Needs further instruction and Family not present    PLAN:  Skilled SLP intervention on acute care 3-5 x per week or until goals met and/or pt plateaus in function. Specific interventions for next session may include: dysphagia management/intervention . PATIENT GOAL:    Did not state. Will further assess during treatment. SHORT TERM GOALS:  Short-term Goals  Timeframe for Short-term Goals: 2 weeks  Goal 1: Patient will consume a soft/bite sized diet and moderately thick liquids without overt s/s of airway invasion to meet nutritional/hydration measures safely. Goal 2: Patient will consume advanced PO solids and thin liquids via teaspoon with ST only without overt s/s of airway invasion to progress solids and determine appropriateness for repeat instrumental assessment. Goal 3: Patient will complete pharyngeal strengthening exercises (i.e. effortful, joaquina) x10 for improved pharyngeal shortening for enhanced airway protection.   Goal 4: Complete repeat MBS in 2-4 weeks to further assess and determine ability to advance liquid intake pending on level of pharyngeal dysfunction still present. Goal 5: Monitor cognitive functioning and complete further assessment as deemed clinically indicated.       LONG TERM GOALS:  No established LTG's given short JAYLA Troncoso [place] 4/23/2022

## 2022-04-23 NOTE — PROGRESS NOTES
[facility]   Nephrology progress note  4/23/2022, 12:03 PM      Pt Name:    Clifford Ribeiro  MRN:     [id number]     [place]:    1950  Admit Date:    4/11/2022  8:20 PM  Primary Care Physician:  Molly Lucas MD   Room number  5Y-81/846-K    Chief Complaint: Nephrology following for LAVIN/CKD requiring dialysis    Subjective:  Patient seen and examined  No chest pain or shortness of breath  Feels okay    Objective:  24HR INTAKE/OUTPUT:    Intake/Output Summary (Last 24 hours) at 4/23/2022 1203  Last data filed at 4/23/2022 0546  Gross per 24 hour   Intake 2290 ml   Output 4885 ml   Net -2595 ml     I/O last 3 completed shifts: In: 46 [Blood:315; NG/GT:1175; IV DVXVAGMUZ:615]  Out: 4180 [Urine:750; Stool:25]  No intake/output data recorded. Admission weight: 134 lb (60.8 kg)  Wt Readings from Last 3 Encounters:   04/22/22 157 lb 3 oz (71.3 kg)   03/29/22 134 lb (60.8 kg)   03/13/22 133 lb (60.3 kg)     Body mass index is 34.02 kg/m². Physical examination  VITALS:     Vitals:    04/23/22 0011 04/23/22 0413 04/23/22 0900 04/23/22 1152   BP: (!) 118/57 (!) 119/57 125/73 (!) 149/70   Pulse: 66 72 85 80   Resp: 16 16 16 18   Temp: 98.6 °F (37 °C) 98.8 °F (37.1 °C) 98.2 °F (36.8 °C) 98.6 °F (37 °C)   TempSrc: Oral Oral Oral Axillary   SpO2: 99% 97% 93% 94%   Weight:       Height:         General Appearance: alert and cooperative with exam, appears comfortable, no distress  Mouth/Throat: Oral mucosa moist  Neck: No JVD  Lungs: Air entry B/L, no rales, no use of accessory muscles  Heart:  S1, S2 heard  GI: soft, non-tender, no guarding      Lab Data  CBC:   Recent Labs     04/21/22  1526 04/21/22  1526 04/22/22  0512 04/22/22  1951 04/23/22  0450   WBC 18.2*  --  12.9*  --  12.7*   HGB 7.5*   < > 6.9* 8.5* 7.5*   HCT 25.4*   < > 23.6* 28.9* 25.1*     --  184  --  173    < > = values in this interval not displayed.      BMP:  Recent Labs     04/21/22  0406 04/22/22  0512 04/23/22  0450 * 137 134*   K 4.3 4.0 4.2   CL 97* 100 98   CO2 24 26 24   BUN 70* 37* 49*   CREATININE 4.0* 2.4* 2.9*   GLUCOSE 95 106 97   CALCIUM 8.1* 8.1* 8.5     Hepatic: No results for input(s): LABALBU, AST, ALT, ALB, BILITOT, ALKPHOS in the last 72 hours. Meds:  Infusion:    sodium chloride      dextrose      sodium chloride Stopped (04/16/22 0220)    sodium chloride       Meds:    metoprolol tartrate  25 mg Oral BID    ciprofloxacin  400 mg IntraVENous Q24H    guaiFENesin  600 mg Oral BID    amoxicillin  250 mg Oral Q24H    sodium hypochlorite   Irrigation Daily    silver nitrate applicators  1 each Topical Once    miconazole   Topical BID    [Held by provider] aspirin  81 mg Oral Daily    [Held by provider] bumetanide  0.5 mg Oral Daily    famotidine  20 mg PEG Tube Every Other Day    ferrous sulfate  325 mg Oral Every Other Day    FLUoxetine  40 mg Oral Daily    sodium chloride flush  5-40 mL IntraVENous 2 times per day     Meds prn: sodium chloride, ondansetron, ipratropium-albuterol, glucagon (rDNA), dextrose, glucose, dextrose bolus (hypoglycemia) **OR** dextrose bolus (hypoglycemia), acetaminophen **OR** acetaminophen, melatonin, sodium chloride flush, sodium chloride, sodium chloride       Impression and Plan:  1. ALVIN on CKD currently requiring dialysis  Had tunneled dialysis catheter placed yesterday  Awaiting placement  No need for dialysis today  Check labs in a.m. Outpatient dialysis being arranged per case management    2. Mild hyponatremia secondary to impaired free water excretion  3. History of ANCA positive  4. Nephrostomy tube with intermittent bleeding. Urology following  5. Chronic diastolic dysfunction  6. Staghorn calculus  7. Anemia. Monitor H&H      Tasha Maldonado MD  [facility]    This report has been created using voice recognition software.  It may contain minor errors which are inherent in voice recognition technology

## 2022-04-24 LAB
ANION GAP SERPL CALCULATED.3IONS-SCNC: 13 MEQ/L (ref 8–16)
BASOPHILS # BLD: 0.5 %
BASOPHILS ABSOLUTE: 0.1 THOU/MM3 (ref 0–0.1)
BUN BLDV-MCNC: 63 MG/DL (ref 7–22)
CALCIUM SERPL-MCNC: 8.6 MG/DL (ref 8.5–10.5)
CHLORIDE BLD-SCNC: 99 MEQ/L (ref 98–111)
CO2: 23 MEQ/L (ref 23–33)
CREAT SERPL-MCNC: 3.5 MG/DL (ref 0.4–1.2)
EOSINOPHIL # BLD: 4 %
EOSINOPHILS ABSOLUTE: 0.5 THOU/MM3 (ref 0–0.4)
ERYTHROCYTE [DISTWIDTH] IN BLOOD BY AUTOMATED COUNT: 15.3 % (ref 11.5–14.5)
ERYTHROCYTE [DISTWIDTH] IN BLOOD BY AUTOMATED COUNT: 53.6 FL (ref 35–45)
GFR SERPL CREATININE-BSD FRML MDRD: 13 ML/MIN/1.73M2
GLUCOSE BLD-MCNC: 107 MG/DL (ref 70–108)
GLUCOSE BLD-MCNC: 127 MG/DL (ref 70–108)
GLUCOSE BLD-MCNC: 95 MG/DL (ref 70–108)
GLUCOSE BLD-MCNC: 98 MG/DL (ref 70–108)
GLUCOSE BLD-MCNC: 98 MG/DL (ref 70–108)
HCT VFR BLD CALC: 26.1 % (ref 37–47)
HEMOGLOBIN: 7.8 GM/DL (ref 12–16)
IMMATURE GRANS (ABS): 0.04 THOU/MM3 (ref 0–0.07)
IMMATURE GRANULOCYTES: 0.3 %
LYMPHOCYTES # BLD: 6.4 %
LYMPHOCYTES ABSOLUTE: 0.7 THOU/MM3 (ref 1–4.8)
MCH RBC QN AUTO: 29 PG (ref 26–33)
MCHC RBC AUTO-ENTMCNC: 29.9 GM/DL (ref 32.2–35.5)
MCV RBC AUTO: 97 FL (ref 81–99)
MONOCYTES # BLD: 8.8 %
MONOCYTES ABSOLUTE: 1 THOU/MM3 (ref 0.4–1.3)
NUCLEATED RED BLOOD CELLS: 0 /100 WBC
PLATELET # BLD: 194 THOU/MM3 (ref 130–400)
PMV BLD AUTO: 9 FL (ref 9.4–12.4)
POTASSIUM SERPL-SCNC: 4.3 MEQ/L (ref 3.5–5.2)
RBC # BLD: 2.69 MILL/MM3 (ref 4.2–5.4)
SEG NEUTROPHILS: 80 %
SEGMENTED NEUTROPHILS ABSOLUTE COUNT: 9.3 THOU/MM3 (ref 1.8–7.7)
SODIUM BLD-SCNC: 135 MEQ/L (ref 135–145)
WBC # BLD: 11.6 THOU/MM3 (ref 4.8–10.8)

## 2022-04-24 PROCEDURE — 6370000000 HC RX 637 (ALT 250 FOR IP): Performed by: STUDENT IN AN ORGANIZED HEALTH CARE EDUCATION/TRAINING PROGRAM

## 2022-04-24 PROCEDURE — 85025 COMPLETE CBC W/AUTO DIFF WBC: CPT

## 2022-04-24 PROCEDURE — 99232 SBSQ HOSP IP/OBS MODERATE 35: CPT | Performed by: INTERNAL MEDICINE

## 2022-04-24 PROCEDURE — 82948 REAGENT STRIP/BLOOD GLUCOSE: CPT

## 2022-04-24 PROCEDURE — 2580000003 HC RX 258: Performed by: STUDENT IN AN ORGANIZED HEALTH CARE EDUCATION/TRAINING PROGRAM

## 2022-04-24 PROCEDURE — 36415 COLL VENOUS BLD VENIPUNCTURE: CPT

## 2022-04-24 PROCEDURE — 6360000002 HC RX W HCPCS: Performed by: PHYSICIAN ASSISTANT

## 2022-04-24 PROCEDURE — 80048 BASIC METABOLIC PNL TOTAL CA: CPT

## 2022-04-24 PROCEDURE — 6360000002 HC RX W HCPCS: Performed by: PHARMACIST

## 2022-04-24 PROCEDURE — 1200000000 HC SEMI PRIVATE

## 2022-04-24 PROCEDURE — 6370000000 HC RX 637 (ALT 250 FOR IP): Performed by: PHYSICIAN ASSISTANT

## 2022-04-24 RX ADMIN — GUAIFENESIN 600 MG: 600 TABLET, EXTENDED RELEASE ORAL at 08:39

## 2022-04-24 RX ADMIN — METOPROLOL TARTRATE 25 MG: 25 TABLET, FILM COATED ORAL at 21:36

## 2022-04-24 RX ADMIN — SODIUM CHLORIDE, PRESERVATIVE FREE 10 ML: 5 INJECTION INTRAVENOUS at 09:13

## 2022-04-24 RX ADMIN — FLUOXETINE 40 MG: 20 CAPSULE ORAL at 08:39

## 2022-04-24 RX ADMIN — AMOXICILLIN 250 MG: 250 CAPSULE ORAL at 14:00

## 2022-04-24 RX ADMIN — ONDANSETRON 4 MG: 2 INJECTION INTRAMUSCULAR; INTRAVENOUS at 08:43

## 2022-04-24 RX ADMIN — GUAIFENESIN 600 MG: 600 TABLET, EXTENDED RELEASE ORAL at 21:28

## 2022-04-24 RX ADMIN — FERROUS SULFATE TAB 325 MG (65 MG ELEMENTAL FE) 325 MG: 325 (65 FE) TAB at 08:39

## 2022-04-24 RX ADMIN — MICONAZOLE NITRATE: 20 POWDER TOPICAL at 08:39

## 2022-04-24 RX ADMIN — CIPROFLOXACIN 400 MG: 2 INJECTION, SOLUTION INTRAVENOUS at 22:46

## 2022-04-24 RX ADMIN — SODIUM CHLORIDE, PRESERVATIVE FREE 10 ML: 5 INJECTION INTRAVENOUS at 21:28

## 2022-04-24 RX ADMIN — METOPROLOL TARTRATE 25 MG: 25 TABLET, FILM COATED ORAL at 08:39

## 2022-04-24 ASSESSMENT — PAIN SCALES - GENERAL: PAINLEVEL_OUTOF10: 0

## 2022-04-24 NOTE — PROGRESS NOTES
[facility]   Nephrology progress note  4/24/2022, 12:03 PM      Pt Name:    Val Alcocer  MRN:     [id number]     [place]:    1950  Admit Date:    4/11/2022  8:20 PM  Primary Care Physician:  Rhys Baldwin MD   Room number  1T-63/820-E    Chief Complaint: Nephrology following for ALVIN/CKD requiring dialysis    Subjective:  Patient seen and examined  Late entry  No chest pain or shortness of breath    Objective:  24HR INTAKE/OUTPUT:      Intake/Output Summary (Last 24 hours) at 4/24/2022 1203  Last data filed at 4/24/2022 0641  Gross per 24 hour   Intake 1509 ml   Output 675 ml   Net 834 ml     I/O last 3 completed shifts: In: 2399 [I.V.:5; GA/UM:8675; IV Piggyback:400]  Out: 685 [Urine:830; Stool:130]  No intake/output data recorded. Admission weight: 134 lb (60.8 kg)  Wt Readings from Last 3 Encounters:   04/22/22 157 lb 3 oz (71.3 kg)   03/29/22 134 lb (60.8 kg)   03/13/22 133 lb (60.3 kg)     Body mass index is 34.02 kg/m². Physical examination  VITALS:     Vitals:    04/23/22 1948 04/23/22 2317 04/24/22 0848 04/24/22 1118   BP: 134/66 (!) 119/57 (!) 178/78 (!) 125/58   Pulse: 80 74 77 67   Resp: 16 16 18 16   Temp: 98.4 °F (36.9 °C) 99.2 °F (37.3 °C) 98.1 °F (36.7 °C) 99 °F (37.2 °C)   TempSrc: Oral Oral Oral Oral   SpO2: 94% 95%  (!) 87%   Weight:       Height:         General Appearance: appears comfortable, no distress  Mouth/Throat: Oral mucosa moist  Neck: No JVD  Lungs: Air entry B/L, no rales, no use of accessory muscles  Heart:  S1, S2 heard  GI: soft, non-tender, no guarding      Lab Data  CBC:   Recent Labs     04/22/22  0512 04/22/22  0512 04/22/22 1951 04/23/22  0450 04/24/22  0743   WBC 12.9*  --   --  12.7* 11.6*   HGB 6.9*   < > 8.5* 7.5* 7.8*   HCT 23.6*   < > 28.9* 25.1* 26.1*     --   --  173 194    < > = values in this interval not displayed.      BMP:  Recent Labs     04/22/22  0512 04/23/22  0450 04/24/22  0743    134* 135   K 4.0 4.2 4.3   CL 100 98 99   CO2 26 24 23   BUN 37* 49* 63*   CREATININE 2.4* 2.9* 3.5*   GLUCOSE 106 97 95   CALCIUM 8.1* 8.5 8.6     Hepatic: No results for input(s): LABALBU, AST, ALT, ALB, BILITOT, ALKPHOS in the last 72 hours. Meds:  Infusion:    sodium chloride      dextrose      sodium chloride Stopped (04/16/22 0220)    sodium chloride       Meds:    metoprolol tartrate  25 mg Oral BID    ciprofloxacin  400 mg IntraVENous Q24H    guaiFENesin  600 mg Oral BID    amoxicillin  250 mg Oral Q24H    sodium hypochlorite   Irrigation Daily    silver nitrate applicators  1 each Topical Once    miconazole   Topical BID    [Held by provider] aspirin  81 mg Oral Daily    [Held by provider] bumetanide  0.5 mg Oral Daily    famotidine  20 mg PEG Tube Every Other Day    ferrous sulfate  325 mg Oral Every Other Day    FLUoxetine  40 mg Oral Daily    sodium chloride flush  5-40 mL IntraVENous 2 times per day     Meds prn: sodium chloride, ondansetron, ipratropium-albuterol, glucagon (rDNA), dextrose, glucose, dextrose bolus (hypoglycemia) **OR** dextrose bolus (hypoglycemia), acetaminophen **OR** acetaminophen, melatonin, sodium chloride flush, sodium chloride, sodium chloride       Impression and Plan:  1. ALVIN on CKD currently requiring dialysis  Had tunneled dialysis catheter placed on Friday  Awaiting placement  No need for dialysis today  Check labs in a.m. Outpatient dialysis being arranged per case management  Plan hemodialysis treatment tomorrow    2. Mild hyponatremia secondary to impaired free water excretion  3. History of ANCA positive  4. Nephrostomy tube with intermittent bleeding. Urology following  5. Chronic diastolic dysfunction  6. Staghorn calculus  7. Anemia. Monitor H&H      Tina Sood MD  [facility]    This report has been created using voice recognition software.  It may contain minor errors which are inherent in voice recognition technology

## 2022-04-24 NOTE — PROGRESS NOTES
Hospitalist Progress Note      Patient:  Clifford Ribeiro    Unit/Bed:6K-18/018-A  YOB: 1950  MRN: [id number]   Acct: [de-identified]   PCP: Molly Lucas MD  Date of Admission: 4/11/2022    Assessment/Plan:     ALVIN on CKD stage III, improving: unclear etiology but some volume depletion contributing in additional to diuretic use per Nephro who has been consulted. Renal US without acute abnormalities. Aggressive IVFs. Avoid nephrotoxic agents / contrast. Renal US without acute abnormalities. S/p temporary dialysis catheter and RRT. 4/19: Nephro plans potential tunneled catheter tomorrow, no anticoagulation. Continue with IVF for now. Monitor labs in am for final decision, appreciate Nephro. Acute hypoxic respiratory failure: on 1 L NC with SpO2 96%. Encourage IS / Acapella. CXR shows LLL infiltrate vs atelectasis. Suspect atelectasis with pt's presentation. Wean O2 as tolerated. Hypomagnesemia, resolved: replaced      Hyperkalemia, resolved: Nephrology following. Check daily BMP. Dislodged L nephrostomy tube s/p nephrostogram, resolved: no indication of dislodgement on CT A/P, IR has been consulted for assessment. Previously dislodged and replaced multiple times, last being 3/14/22. L staghorn calculus / bleeding L nephrostomy tube: Urology has been consulted. Repeat CT A/P from 4/12 shows L sided perinephric stranding and fluid collection adjacent to tube which is felt to likely be subcapsular hematoma. Acute cystitis: chronic seaman catheter, UA shows few bacteria, moderate leukocyte esterase and 50-75 with clumps WBC. Urine culture returned growing nonfermenting gram-negative bacilli, enteric gram-negative bacilli, and gram-positive cocci. Pt denies any burning at the site of her catheter. Will discuss with Urology if treatment is indicated at this time. Afebrile, no leukocytosis.     4/18: urine gx returned growing Pseudomonas or [place], Klebsiella pneumoniae and Enterococcus faecalis group D. Continue Cipro and add PO amoxicillin per ID (complete 7 days)     Acute on chronic normocytic anemia: Hg 7.5 on admission with baseline Hg 8.0s-9.0s. Mild-moderate dysphagia: seen by SLP, underwent MBS. Recommended honey thick liquids, no straws, and soft and bite sized. Metabolic acidosis: on sodium bicarbonate per Nephro, continue to monitor. Will correct with HD. Resolved. Elevated troponin: likely related to CKD, denies CP, EKG showed sinus tach with nonspecific ST/T wave change. Chronic HFpEF without decompensation: TOBIAS 10/2021 EF 60% with trace TR. On Bumex 0.5 mg daily which is held due to ALVIN and dehydration     Essential HTN:slightly elevated, continue with home meds and monitor closely      Hx stage III decub ulcer s/p diverting colostomy 1/27/22: Wound care consulted and following      Anxiety: Continue home medications      ED work-up: Afebrile with BP elevate. BMP show hypokalemia with potassium 5.4, serum osmolarity elevate 322.1. EKG showed sinus tachycardia with PVC, St & T wave abnormality. Patient is asymptomatic for chest pain. CBC showed anemia with hemoglobin 7.5 which reduced compared to her baseline at 8 on 9. \"    4/13: pt doing okay, sitting up in bed with family at bedside. Denies fever/chills/CP/SOB. Still has dark blood noted in her nephrostomy bag. Denies any burning at the site of her catheter. 4/14: pt seen having returned from [facility] today and was noted to have episode of nausea/choking / coughing and a small amount of whitish emesis (barium). Pt denied abd pain and denied any SOB at that time. 4/15: pt lying in bed, no complaints. Still with blood in her nephrostomy bag. Denies SOB/cough. No fever/chills. No CP. A&Ox4.     4/16: pt doing fine, lying in bed. Offers no new complaints. Had HD today. Still with blood in her nephrostomy bag. Hgb remains stable.     4/17: pt continues to have these choking like episodes where it appears she cannot breathe. She denies any sort of dysphagia or choking sensation, but notes that she feels nauseated, like \"dry heaving\". No fever/chills. Kidney function improving. Seems in brighter spirits today. 4/18: pt reports she is doing okay. No CP/SOB, working with her acapella. Still has blood in her nephrostomy bag. No fever/chills. 4/19: no new complaints, discussed with nurse. Unable to wean off the 1L NC as pt drops into the 80's. Encouraged to continue with incentive spirometry. Hold ASA / anticoagulation pending possible tunneled cath tomorrow. 4.20.2022 Patient seen this a.m. no complaints discussed the possible need for hemodialysis. On amoxicillin and ciprofloxacin for urinary tract infection. 4.21.2022 patient seen this a.m. no complaints resting comfortably. Creatinine 4.0, CBC is pending, will transfuse if okay with renal.  Tunneled dialysis catheter to be placed, marginal urine output minimal response to diuretics, anticoagulation and antiplatelets are on hold. 4.22.2022 patient seen this a.m. resting comfortably we will transfuse 1 unit of packed RBCs, Tunnel dialysis catheter to be placed today. Creatinine 2.4 today, GFR 20    4.23.2022 patient seen this a.m., was transfused 1 unit of blood during dialysis, recheck after dialysis hemoglobin was 8.5 today its 7.5. Urology is following. Tunneled dialysis catheter was placed yesterday without complications    [id number] seen this a.m. hemoglobin stable at 7.8, possible discharge Monday if case management can obtain hemodialysis chair time. Chief Complaint: Abnormal lab    Initial H and P:-    \"Kenzie Purcell is a 73 y.o., , female presented to [facility] ED for abnormal lab from nursing home.   Past medical history significant with chronic diastolic heart failure, obstructive staghorn calculus of the left kidney not a candidate for stone treatment which nephrostomy tube was placed, stage III decubitus ulcer, CKD stage III, general anxiety disorder. Visit patient awake alert and oriented to time person and place. Patient denies any chest pain, chest discomfort, abdominal pain, back pain, neck pain. Patient sister on the bedside report that patient had been having bright red blood in nephrostomy tube over last 3 days. Today patient nursing home notify abnormal lab work which patient was transferred to [facility] ED.     Patient is chronic critical ill with PEG tube for nutritional, divers colostomy bag for stage III decubitus ulcer, nephrostomy tube drain bright red blood. Patient living in nursing home, denies alcohol use, tobacco use.     Patient was admitted and managed for ALVIN secondary to dehydration and possible obstruction.        Past medical history, family history, social history and allergies reviewed again and is unchanged since admission. ROS (All review of systems completed. Pertinent positives noted.  Otherwise All other systems reviewed and negative.)     Medications:  Reviewed    Infusion Medications    sodium chloride      dextrose      sodium chloride Stopped (04/16/22 0220)    sodium chloride       Scheduled Medications    metoprolol tartrate  25 mg Oral BID    ciprofloxacin  400 mg IntraVENous Q24H    guaiFENesin  600 mg Oral BID    amoxicillin  250 mg Oral Q24H    sodium hypochlorite   Irrigation Daily    silver nitrate applicators  1 each Topical Once    miconazole   Topical BID    [Held by provider] aspirin  81 mg Oral Daily    [Held by provider] bumetanide  0.5 mg Oral Daily    famotidine  20 mg PEG Tube Every Other Day    ferrous sulfate  325 mg Oral Every Other Day    FLUoxetine  40 mg Oral Daily    sodium chloride flush  5-40 mL IntraVENous 2 times per day     PRN Meds: sodium chloride, ondansetron, ipratropium-albuterol, glucagon (rDNA), dextrose, glucose, dextrose bolus (hypoglycemia) **OR** dextrose bolus (hypoglycemia), acetaminophen **OR** acetaminophen, melatonin, sodium chloride flush, sodium chloride, sodium chloride      Intake/Output Summary (Last 24 hours) at 4/24/2022 0946  Last data filed at 4/24/2022 0641  Gross per 24 hour   Intake 1509 ml   Output 675 ml   Net 834 ml       Diet:  ADULT DIET; Dysphagia - Soft and Bite Sized; Moderately Thick (Honey); No Drinking Straws  ADULT ORAL NUTRITION SUPPLEMENT; Breakfast, Lunch, Dinner; Frozen Oral Supplement  ADULT TUBE FEEDING; PEG; Renal Formula; Cyclic; 55; 2:20 PM; 8:99 AM; 30; Other (specify); 30 mls before and after cyclic feeding    Exam:  BP (!) 178/78   Pulse 77   Temp 98.1 °F (36.7 °C) (Oral)   Resp 18   Ht 4' 9\" (1.448 m)   Wt 157 lb 3 oz (71.3 kg)   SpO2 95%   BMI 34.02 kg/m²   General appearance: chronically ill appearing, no apparent distress, appears stated age and cooperative. HEENT: Pupils equal, round, and reactive to light. Conjunctivae/corneas clear. Neck: Supple, with full range of motion. No jugular venous distention. Trachea midline. Respiratory:  Normal respiratory effort. Clear to auscultation, bilateral crackles in bases  Cardiovascular: Regular rate and rhythm with normal S1/S2 without murmurs, rubs or gallops. Abdomen: Soft, non-tender, non-distended with normal bowel sounds. L side nephrostomy tube with blood in bag  Musculoskeletal: passive and active ROM x 4 extremities. Positive for edema  Skin: Skin color, texture, turgor normal.  No rashes or lesions. Neurologic:  Neurovascularly intact without any focal sensory/motor deficits.  Cranial nerves: II-XII intact, grossly non-focal.  Psychiatric: Alert and oriented x4, thought content appropriate, normal insight  Capillary Refill: Brisk,< 3 seconds   Peripheral Pulses: +2 palpable, equal bilaterally     Labs:   Recent Labs     04/22/22  0512 04/22/22  0512 04/22/22  1951 04/23/22  0450 04/24/22  0743   WBC 12.9*  --   --  12.7* 11.6*   HGB 6.9*   < > 8.5* 7.5* 7.8*   HCT 23.6*   < > 28.9* 25.1* 26.1*   PLT 184  --   --  173 194    < > = values in this interval not displayed. Recent Labs     04/22/22  0512 04/23/22  0450 04/24/22  0743    134* 135   K 4.0 4.2 4.3    98 99   CO2 26 24 23   BUN 37* 49* 63*   CREATININE 2.4* 2.9* 3.5*   CALCIUM 8.1* 8.5 8.6     No results for input(s): AST, ALT, BILIDIR, BILITOT, ALKPHOS in the last 72 hours. No results for input(s): INR in the last 72 hours. No results for input(s): Mckayla Jose in the last 72 hours. Microbiology:    Blood culture #1:   Lab Results   Component Value Date    BC No growth-preliminary No growth  01/21/2022       Blood culture #2:No results found for: Blair Granda    Organism:  Lab Results   Component Value Date    ORG Pseudomonas aeruginosa 04/12/2022    ORG Klebsiella pneumoniae 04/12/2022    ORG Enterococcus faecalis - (Group D) 04/12/2022         Lab Results   Component Value Date    LABGRAM  02/22/2022     Many segmented neutrophils observed. No epithelial cells observed. No bacteria seen. MRSA culture only:No results found for: [facility]    Urine culture:   Lab Results   Component Value Date    LABURIN No growth-preliminary  01/20/2022    LABURIN [place] count: 1,000 CFU/mL  01/20/2022       Respiratory culture: No results found for: CULTRESP    Aerobic and Anaerobic :  Lab Results   Component Value Date    LABAERO  02/22/2022     No growth-preliminary Current antibiotic therapy ineffective in vitro for at least one of culture isolates. LABAERO light growth  02/22/2022    LABAERO  02/22/2022     very light growth Isolates of Methicillin Resistant Staphylococcus coagulase negative (MRSE) do NOT require CONTACT isolation. Methicillin(Oxacillin)resistant strains of staphylococci (MRSA)or(MRSE)should be considered resistant to all classes of cephalosporins, penems and beta-lactams.       Lab Results   Component Value Date    LABANAE  02/22/2022     No anaerobes isolated- preliminary No anaerobes isolated        Urinalysis: Final report electronically signed by Dr. Murphy Chase on 4/15/2022 4:18 PM      IR FLUORO GUIDED NEEDLE PLACEMENT   Final Result   Status post successful nontunneled dialysis catheter insertion. **This report has been created using voice recognition software. It may contain minor errors which are inherent in voice recognition technology. **      Final report electronically signed by Dr Alessia Rodriguez on 4/15/2022 12:06 PM      XR CHEST PORTABLE   Final Result   1. Left lower lung atelectasis/infiltrate. 2. Mild stable cardiomegaly. **This report has been created using voice recognition software. It may contain minor errors which are inherent in voice recognition technology. **      Final report electronically signed by Dr. Roe Chapman on 4/14/2022 11:54 AM      FL MODIFIED BARIUM SWALLOW W VIDEO   Final Result   Laryngeal penetration and aspiration with thin consistency. Laryngeal penetration with nectar thick. Recommendations available from speech therapy            **This report has been created using voice recognition software. It may contain minor errors which are inherent in voice recognition technology. **      Final report electronically signed by Dr. Murphy Chase on 4/14/2022 12:39 PM      IR GUIDED NEPHROSTOGRAM/URETEROGRAM EXISTING ACCESS   Final Result   The tip of the nephrostomy tube is coiled in the left renal collecting system. **This report has been created using voice recognition software. It may contain minor errors which are inherent in voice recognition technology. **         Final report electronically signed by Dr Alessia Rodriguez on 4/14/2022 9:08 AM      CT ABDOMEN PELVIS WO CONTRAST Additional Contrast? None   Final Result   1. Small bilateral pleural effusions with adjacent atelectasis/infiltrate. 2. Left-sided perinephric stranding and fluid collection adjacent to a left-sided nephrostomy tube, likely a subcapsular hematoma.  Stable calcifications in the left kidney. 3. Cholelithiasis. 4. Sacral decubitus ulcer. Final report electronically signed by Dr. Keegan Gonzalez on 4/12/2022 5:00 PM      US RENAL COMPLETE   Final Result   1. Significantly limited exam.   2. Increased renal cortical echogenicity bilaterally, compatible with    medical renal disease. 3. A left-sided nephrostomy tube is partially visualized. 4. Multiple left-sided renal calculi. 5. Mild left-sided caliectasis. The left renal pelvis is nondilated. 6. Nonspecific perinephric or subcapsular fluid is noted adjacent to the    left kidney, measuring 5.3 x 2.1 x 1.4 cm. This document has been electronically signed by: Irina Mejia M.D. on    04/12/2022 02:30 AM        CT ABDOMEN PELVIS WO CONTRAST Additional Contrast? None    Result Date: 4/12/2022  PROCEDURE: CT ABDOMEN PELVIS WO CONTRAST CLINICAL INFORMATION: Left staghorn calculus TECHNIQUE: CT of the abdomen and pelvis was performed without use of intravenous contrast. Axial images as well as sagittal and coronal reconstructions were obtained. All CT scans at this facility use dose modulation, iterative reconstruction, and/or weight-based dosing when appropriate to reduce radiation dose to as low as reasonably achievable. COMPARISON: CT abdomen and pelvis 3/13/2022 FINDINGS: Lower thorax: There are small bilateral pleural effusions. There is adjacent lung consolidation are present. The heart is enlarged. Abdomen: Evaluation is limited due to absence of contrast. There is no free intraperitoneal air. A gastrostomy tube is in the stomach. A left mid abdominal ostomy is stable and contains loops of bowel. There is no bowel obstruction. Calcifications are present in the lumen of the gallbladder. A nephrostomy tube is now present in the left kidney. Fluid adjacent to the tube and kidney has noncontrast mean Hounsfield units of 12 (image 34). Subcapsular fluid measures up to 2.2 cm in width (image 38). Perinephric stranding is again noted. Calcifications in the left kidney are stable. Left-sided hydronephrosis is not significantly changed. Hypoattenuating lesions in the kidneys may be cysts but are incompletely characterized on the current study. There  are calcified granulomas in the liver and spleen. Atherosclerotic calcifications are present in the abdominal aorta without evidence of aneurysm. There is no mesenteric or retroperitoneal lymphadenopathy. Degenerative changes are seen in the thoracolumbar spine without evidence of aggressive osseous lesions. Pelvis: There is a Padilla catheter in a nondistended urinary bladder, likely accounting for gas in the bladder. Platelets are present in the pelvis. There are calcifications in the uterus. There is no pelvic or inguinal lymphadenopathy. There is persistent subcutaneous tissue irregularity posterior to the sacrum. Degenerative changes are present in the pelvis without evidence of aggressive osseous lesions. 1. Small bilateral pleural effusions with adjacent atelectasis/infiltrate. 2. Left-sided perinephric stranding and fluid collection adjacent to a left-sided nephrostomy tube, likely a subcapsular hematoma. Stable calcifications in the left kidney. 3. Cholelithiasis. 4. Sacral decubitus ulcer. Final report electronically signed by Dr. Kayode Becerra on 4/12/2022 5:00 PM    US RENAL COMPLETE    Result Date: 4/12/2022  RENAL ULTRASOUND COMPARISON: 1/3/2022. FINDINGS: Examination is significantly limited due to patient positioning and immobility. Increased renal cortical echogenicity is noted bilaterally, compatible with medical renal disease. Multiple shadowing calculi are noted within the left kidney. For example there is a 2.7 cm stone in the left kidney on image 41. Left-sided nephrostomy tube is partially visualized. Mild caliectasis is noted in the left kidney. Left renal pelvis is nondilated.  Nonspecific perinephric or subcapsular fluid is noted adjacent to the left kidney, measuring 5.3 x 2.1 x 1.4 cm on image 49. Urinary bladder contains a Padilla catheter. 1. Significantly limited exam. 2. Increased renal cortical echogenicity bilaterally, compatible with medical renal disease. 3. A left-sided nephrostomy tube is partially visualized. 4. Multiple left-sided renal calculi. 5. Mild left-sided caliectasis. The left renal pelvis is nondilated. 6. Nonspecific perinephric or subcapsular fluid is noted adjacent to the left kidney, measuring 5.3 x 2.1 x 1.4 cm.  This document has been electronically signed by: Irina Mejia M.D. on 04/12/2022 02:30 AM      Electronically signed by Sahil De Leon DO on 4/24/2022 at 9:46 AM

## 2022-04-25 LAB
ANION GAP SERPL CALCULATED.3IONS-SCNC: 11 MEQ/L (ref 8–16)
BASOPHILS # BLD: 0.4 %
BASOPHILS ABSOLUTE: 0 THOU/MM3 (ref 0–0.1)
BUN BLDV-MCNC: 71 MG/DL (ref 7–22)
CALCIUM SERPL-MCNC: 8.3 MG/DL (ref 8.5–10.5)
CHLORIDE BLD-SCNC: 97 MEQ/L (ref 98–111)
CO2: 25 MEQ/L (ref 23–33)
CREAT SERPL-MCNC: 3.9 MG/DL (ref 0.4–1.2)
EOSINOPHIL # BLD: 4.8 %
EOSINOPHILS ABSOLUTE: 0.6 THOU/MM3 (ref 0–0.4)
ERYTHROCYTE [DISTWIDTH] IN BLOOD BY AUTOMATED COUNT: 15.4 % (ref 11.5–14.5)
ERYTHROCYTE [DISTWIDTH] IN BLOOD BY AUTOMATED COUNT: 55.3 FL (ref 35–45)
GFR SERPL CREATININE-BSD FRML MDRD: 11 ML/MIN/1.73M2
GLUCOSE BLD-MCNC: 103 MG/DL (ref 70–108)
GLUCOSE BLD-MCNC: 104 MG/DL (ref 70–108)
GLUCOSE BLD-MCNC: 110 MG/DL (ref 70–108)
GLUCOSE BLD-MCNC: 130 MG/DL (ref 70–108)
GLUCOSE BLD-MCNC: 134 MG/DL (ref 70–108)
HCT VFR BLD CALC: 26.6 % (ref 37–47)
HEMOGLOBIN: 7.9 GM/DL (ref 12–16)
IMMATURE GRANS (ABS): 0.05 THOU/MM3 (ref 0–0.07)
IMMATURE GRANULOCYTES: 0.4 %
LYMPHOCYTES # BLD: 6.7 %
LYMPHOCYTES ABSOLUTE: 0.8 THOU/MM3 (ref 1–4.8)
MCH RBC QN AUTO: 29.2 PG (ref 26–33)
MCHC RBC AUTO-ENTMCNC: 29.7 GM/DL (ref 32.2–35.5)
MCV RBC AUTO: 98.2 FL (ref 81–99)
MONOCYTES # BLD: 9.6 %
MONOCYTES ABSOLUTE: 1.1 THOU/MM3 (ref 0.4–1.3)
NUCLEATED RED BLOOD CELLS: 0 /100 WBC
PLATELET # BLD: 195 THOU/MM3 (ref 130–400)
PMV BLD AUTO: 8.9 FL (ref 9.4–12.4)
POTASSIUM SERPL-SCNC: 4.5 MEQ/L (ref 3.5–5.2)
RBC # BLD: 2.71 MILL/MM3 (ref 4.2–5.4)
SEG NEUTROPHILS: 78.1 %
SEGMENTED NEUTROPHILS ABSOLUTE COUNT: 9.1 THOU/MM3 (ref 1.8–7.7)
SODIUM BLD-SCNC: 133 MEQ/L (ref 135–145)
WBC # BLD: 11.6 THOU/MM3 (ref 4.8–10.8)

## 2022-04-25 PROCEDURE — 6370000000 HC RX 637 (ALT 250 FOR IP): Performed by: STUDENT IN AN ORGANIZED HEALTH CARE EDUCATION/TRAINING PROGRAM

## 2022-04-25 PROCEDURE — 80048 BASIC METABOLIC PNL TOTAL CA: CPT

## 2022-04-25 PROCEDURE — 99232 SBSQ HOSP IP/OBS MODERATE 35: CPT | Performed by: INTERNAL MEDICINE

## 2022-04-25 PROCEDURE — 82948 REAGENT STRIP/BLOOD GLUCOSE: CPT

## 2022-04-25 PROCEDURE — 6370000000 HC RX 637 (ALT 250 FOR IP): Performed by: PHYSICIAN ASSISTANT

## 2022-04-25 PROCEDURE — 6370000000 HC RX 637 (ALT 250 FOR IP): Performed by: INTERNAL MEDICINE

## 2022-04-25 PROCEDURE — 97530 THERAPEUTIC ACTIVITIES: CPT

## 2022-04-25 PROCEDURE — 1200000000 HC SEMI PRIVATE

## 2022-04-25 PROCEDURE — 97535 SELF CARE MNGMENT TRAINING: CPT

## 2022-04-25 PROCEDURE — 36415 COLL VENOUS BLD VENIPUNCTURE: CPT

## 2022-04-25 PROCEDURE — 85025 COMPLETE CBC W/AUTO DIFF WBC: CPT

## 2022-04-25 PROCEDURE — 97166 OT EVAL MOD COMPLEX 45 MIN: CPT

## 2022-04-25 PROCEDURE — 2580000003 HC RX 258: Performed by: STUDENT IN AN ORGANIZED HEALTH CARE EDUCATION/TRAINING PROGRAM

## 2022-04-25 PROCEDURE — 97162 PT EVAL MOD COMPLEX 30 MIN: CPT

## 2022-04-25 RX ADMIN — SODIUM CHLORIDE, PRESERVATIVE FREE 10 ML: 5 INJECTION INTRAVENOUS at 13:29

## 2022-04-25 RX ADMIN — MICONAZOLE NITRATE: 20 POWDER TOPICAL at 13:29

## 2022-04-25 RX ADMIN — AMOXICILLIN 250 MG: 250 CAPSULE ORAL at 13:28

## 2022-04-25 RX ADMIN — SODIUM CHLORIDE, PRESERVATIVE FREE 10 ML: 5 INJECTION INTRAVENOUS at 20:42

## 2022-04-25 RX ADMIN — METOPROLOL TARTRATE 25 MG: 25 TABLET, FILM COATED ORAL at 20:39

## 2022-04-25 RX ADMIN — METOPROLOL TARTRATE 25 MG: 25 TABLET, FILM COATED ORAL at 13:28

## 2022-04-25 RX ADMIN — GUAIFENESIN 600 MG: 600 TABLET, EXTENDED RELEASE ORAL at 20:39

## 2022-04-25 RX ADMIN — GUAIFENESIN 600 MG: 600 TABLET, EXTENDED RELEASE ORAL at 13:28

## 2022-04-25 RX ADMIN — FLUOXETINE 40 MG: 20 CAPSULE ORAL at 13:28

## 2022-04-25 RX ADMIN — DAKIN'S SOLUTION 0.125% (QUARTER STRENGTH): 0.12 SOLUTION at 13:29

## 2022-04-25 RX ADMIN — FAMOTIDINE 20 MG: 20 TABLET ORAL at 13:28

## 2022-04-25 RX ADMIN — MICONAZOLE NITRATE: 20 POWDER TOPICAL at 20:41

## 2022-04-25 ASSESSMENT — PAIN SCALES - GENERAL: PAINLEVEL_OUTOF10: 0

## 2022-04-25 NOTE — PROGRESS NOTES
[facility] 62  INPATIENT OCCUPATIONAL THERAPY  [facility] RENAL TELEMETRY 6K  EVALUATION    Time:   Time In: 815  Time Out: 08  Timed Code Treatment Minutes: 15 Minutes  Minutes: 30          Date: 2022  Patient Name: Francine Loza,   Gender: female      MRN: [id number]  : 1950  (73 y.o.)  Referring Practitioner: Dr. Bobo Bowen DO  Diagnosis: ALVIN  Additional Pertinent Hx: Pt presented to [facility] ED for abnormal lab from nursing home. Past medical history significant with chronic diastolic heart failure, obstructive staghorn calculus of the left kidney not a candidate for stone treatment which nephrostomy tube was placed, stage III decubitus ulcer, CKD stage III, general anxiety disorder. Visit patient awake alert and oriented to time person and place. Patient denies any chest pain, chest discomfort, abdominal pain, back pain, neck pain. Patient sister on the bedside report that patient had been having bright red blood in nephrostomy tube over last 3 days. Today patient nursing home notify abnormal lab work which patient was transferred to [facility] ED. Restrictions/Precautions:       Subjective  Chart Reviewed: [place] [employer] and Physical    Subjective: Pleasant and cooperative  Comments: RN approved session. Pt agreed to change her hospital gown. She had emesis and had refused to get up to the edge of bed. Pain: Denies    Vitals: Oxygen: Pt shows 82% O2 saturation while on room air; she was placed on 2L of O2 per nursing and shows 91% O2 saturation     Social/Functional History:  Lives With: Alone  Type of Home: Facility  Home Layout: One level  Home Equipment: [employer]           ADL Assistance: Needs assistance  Homemaking Assistance: Needs assistance  Homemaking Responsibilities: No    Active : No  Patient's  Info: Facility provides transportation  Leisure & Hobbies: Watching TV  Additional Comments: Pt was needing help for all mobility.   She indicated that she has used a rolling walker when up with therapy. Unkown how recently she was ambulating. She had help with doing her self care. VISION:Corrected    HEARING:  WFL    COGNITION: Decreased Insight, Impaired Memory and Decreased Problem Solving    RANGE OF MOTION:  Bilateral Upper Extremity:  WFL    STRENGTH:  Bilateral Upper Extremity:  Impaired - 3+/5 deltoid; 3+/5 pectoral; 4-/5 biceps and triceps   Strength:  Right: poor+ and Left: fair-    SENSATION:   WFL    ADL:   Upper Extremity Dressing: Minimal Assistance.  don/dofing a hospital gown. Grooming: Wiped her face with a washcloth with setup A  BALANCE:  Pt had refused sitting at the edge of the bed at this time secondary to fatigue and having had emesis earlier    BED MOBILITY:  Not Tested    Exercise:  Pt completed AAROM exercises of her BLE while in supine: heel slides, hip abduction/adduction, hip internal rotation/external rotation, ankle flexion/extension with gentle stretch provided at end dorsifllexion. Tolerated well. Activity Tolerance:  Patient tolerance of  treatment: fair. Pt was showing SOB with light activity. She asked to not do any sitting up at this time. Assessment:  Assessment: Patient would benefit from continued skilled OT services to address above deficits. She presents with Acute Kidney Injury. She had been resident of SNF for several months. She has questionable recall of her prior level of functioning. Per pt, she has used a walker and also a W/C for getting around. She also indicates that she has used a lifting device to get out of bed. Unknown how recently pt had been doing this. Pt had help with doing ADLs, she reports. Pt demonstrate changing her hospital gown with MIN A. She had emesis and had refused sitting up. She did tolerate doing some exercises of her legs while in supine. Pt was placed on 2L of O2 this session secondary to desaturation below 88%.     Performance deficits / Impairments:

## 2022-04-25 NOTE — PROGRESS NOTES
Hospitalist Progress Note      Patient:  Suze Gomez    Unit/Bed:6K-18/018-A  YOB: 1950  MRN: [id number]   Acct: [de-identified]   PCP: Olga Swann MD  Date of Admission: 4/11/2022    Assessment/Plan:     ALVIN on CKD stage III, improving: unclear etiology but some volume depletion contributing in additional to diuretic use per Nephro who has been consulted. Renal US without acute abnormalities. Aggressive IVFs. Avoid nephrotoxic agents / contrast. Renal US without acute abnormalities. S/p temporary dialysis catheter and RRT. 4/19: Nephro plans potential tunneled catheter tomorrow, no anticoagulation. Continue with IVF for now. Monitor labs in am for final decision, appreciate Nephro. Acute hypoxic respiratory failure: on 1 L NC with SpO2 96%. Encourage IS / Acapella. CXR shows LLL infiltrate vs atelectasis. Suspect atelectasis with pt's presentation. Wean O2 as tolerated. Hypomagnesemia, resolved: replaced      Hyperkalemia, resolved: Nephrology following. Check daily BMP. Dislodged L nephrostomy tube s/p nephrostogram, resolved: no indication of dislodgement on CT A/P, IR has been consulted for assessment. Previously dislodged and replaced multiple times, last being 3/14/22. L staghorn calculus / bleeding L nephrostomy tube: Urology has been consulted. Repeat CT A/P from 4/12 shows L sided perinephric stranding and fluid collection adjacent to tube which is felt to likely be subcapsular hematoma. Acute cystitis: chronic seaman catheter, UA shows few bacteria, moderate leukocyte esterase and 50-75 with clumps WBC. Urine culture returned growing nonfermenting gram-negative bacilli, enteric gram-negative bacilli, and gram-positive cocci. Pt denies any burning at the site of her catheter. Will discuss with Urology if treatment is indicated at this time. Afebrile, no leukocytosis.     4/18: urine gx returned growing Pseudomonas or Canosa, Klebsiella pneumoniae and Enterococcus faecalis group D. Continue Cipro and add PO amoxicillin per ID (complete 7 days)     Acute on chronic normocytic anemia: Hg 7.5 on admission with baseline Hg 8.0s-9.0s. Mild-moderate dysphagia: seen by SLP, underwent MBS. Recommended honey thick liquids, no straws, and soft and bite sized. Metabolic acidosis: on sodium bicarbonate per Nephro, continue to monitor. Will correct with HD. Resolved. Elevated troponin: likely related to CKD, denies CP, EKG showed sinus tach with nonspecific ST/T wave change. Chronic HFpEF without decompensation: TOBISA 10/2021 EF 60% with trace TR. On Bumex 0.5 mg daily which is held due to ALVNI and dehydration     Essential HTN:slightly elevated, continue with home meds and monitor closely      Hx stage III decub ulcer s/p diverting colostomy 1/27/22: Wound care consulted and following      Anxiety: Continue home medications      ED work-up: Afebrile with BP elevate. BMP show hypokalemia with potassium 5.4, serum osmolarity elevate 322.1. EKG showed sinus tachycardia with PVC, St & T wave abnormality. Patient is asymptomatic for chest pain. CBC showed anemia with hemoglobin 7.5 which reduced compared to her baseline at 8 on 9. \"    4/13: pt doing okay, sitting up in bed with family at bedside. Denies fever/chills/CP/SOB. Still has dark blood noted in her nephrostomy bag. Denies any burning at the site of her catheter. 4/14: pt seen having returned from [facility] today and was noted to have episode of nausea/choking / coughing and a small amount of whitish emesis (barium). Pt denied abd pain and denied any SOB at that time. 4/15: pt lying in bed, no complaints. Still with blood in her nephrostomy bag. Denies SOB/cough. No fever/chills. No CP. A&Ox4.     4/16: pt doing fine, lying in bed. Offers no new complaints. Had HD today. Still with blood in her nephrostomy bag. Hgb remains stable.     4/17: pt continues to have these choking like episodes where it appears she cannot breathe. She denies any sort of dysphagia or choking sensation, but notes that she feels nauseated, like \"dry heaving\". No fever/chills. Kidney function improving. Seems in brighter spirits today. 4/18: pt reports she is doing okay. No CP/SOB, working with her acapella. Still has blood in her nephrostomy bag. No fever/chills. 4/19: no new complaints, discussed with nurse. Unable to wean off the 1L NC as pt drops into the 80's. Encouraged to continue with incentive spirometry. Hold ASA / anticoagulation pending possible tunneled cath tomorrow. 4.20.2022 Patient seen this a.m. no complaints discussed the possible need for hemodialysis. On amoxicillin and ciprofloxacin for urinary tract infection. 4.21.2022 patient seen this a.m. no complaints resting comfortably. Creatinine 4.0, CBC is pending, will transfuse if okay with renal.  Tunneled dialysis catheter to be placed, marginal urine output minimal response to diuretics, anticoagulation and antiplatelets are on hold. 4.22.2022 patient seen this a.m. resting comfortably we will transfuse 1 unit of packed RBCs, Tunnel dialysis catheter to be placed today. Creatinine 2.4 today, GFR 20    4.23.2022 patient seen this a.m., was transfused 1 unit of blood during dialysis, recheck after dialysis hemoglobin was 8.5 today its 7.5. Urology is following. Tunneled dialysis catheter was placed yesterday without complications    [id number] seen this a.m. hemoglobin stable at 7.8, possible discharge Monday if case management can obtain hemodialysis chair time. 4.25.2022 patient seen this a.m. discussed with family that she is not participating in PT and OT would have to go to a different nursing home.   Will need hemodialysis set up prior to discharge to [facility]    Chief Complaint: Abnormal lab    Initial H and P:-    \"Kenzie Elliott is a 73 y.o., , female presented to [facility] ED for abnormal lab from nursing home.  Past medical history significant with chronic diastolic heart failure, obstructive staghorn calculus of the left kidney not a candidate for stone treatment which nephrostomy tube was placed, stage III decubitus ulcer, CKD stage III, general anxiety disorder. Visit patient awake alert and oriented to time person and place. Patient denies any chest pain, chest discomfort, abdominal pain, back pain, neck pain. Patient sister on the bedside report that patient had been having bright red blood in nephrostomy tube over last 3 days. Today patient nursing home notify abnormal lab work which patient was transferred to [facility] ED.     Patient is chronic critical ill with PEG tube for nutritional, divers colostomy bag for stage III decubitus ulcer, nephrostomy tube drain bright red blood. Patient living in nursing home, denies alcohol use, tobacco use.     Patient was admitted and managed for ALVIN secondary to dehydration and possible obstruction.        Past medical history, family history, social history and allergies reviewed again and is unchanged since admission. ROS (All review of systems completed. Pertinent positives noted.  Otherwise All other systems reviewed and negative.)     Medications:  Reviewed    Infusion Medications    sodium chloride      dextrose      sodium chloride Stopped (04/16/22 0220)    sodium chloride       Scheduled Medications    metoprolol tartrate  25 mg Oral BID    ciprofloxacin  400 mg IntraVENous Q24H    guaiFENesin  600 mg Oral BID    amoxicillin  250 mg Oral Q24H    sodium hypochlorite   Irrigation Daily    silver nitrate applicators  1 each Topical Once    miconazole   Topical BID    [Held by provider] aspirin  81 mg Oral Daily    [Held by provider] bumetanide  0.5 mg Oral Daily    famotidine  20 mg PEG Tube Every Other Day    ferrous sulfate  325 mg Oral Every Other Day    FLUoxetine  40 mg Oral Daily    sodium chloride flush  5-40 mL IntraVENous 2 times per day     PRN Meds: sodium chloride, ondansetron, ipratropium-albuterol, glucagon (rDNA), dextrose, glucose, dextrose bolus (hypoglycemia) **OR** dextrose bolus (hypoglycemia), acetaminophen **OR** acetaminophen, melatonin, sodium chloride flush, sodium chloride, sodium chloride      Intake/Output Summary (Last 24 hours) at 4/25/2022 1330  Last data filed at 4/25/2022 0025  Gross per 24 hour   Intake 322 ml   Output 585 ml   Net -263 ml       Diet:  ADULT DIET; Dysphagia - Soft and Bite Sized; Moderately Thick (Honey); No Drinking Straws  ADULT ORAL NUTRITION SUPPLEMENT; Breakfast, Lunch, Dinner; Frozen Oral Supplement  ADULT TUBE FEEDING; PEG; Renal Formula; Cyclic; 55; 3:13 PM; 4:97 AM; 30; Other (specify); 30 mls before and after cyclic feeding    Exam:  BP (!) 134/57   Pulse 70   Temp 98.4 °F (36.9 °C) (Oral)   Resp 18   Ht 4' 9\" (1.448 m)   Wt 157 lb 3 oz (71.3 kg)   SpO2 100%   BMI 34.02 kg/m²   General appearance: chronically ill appearing, no apparent distress, appears stated age and cooperative. HEENT: Pupils equal, round, and reactive to light. Conjunctivae/corneas clear. Neck: Supple, with full range of motion. No jugular venous distention. Trachea midline. Respiratory:  Normal respiratory effort. Clear to auscultation, bilateral crackles in bases  Cardiovascular: Regular rate and rhythm with normal S1/S2 without murmurs, rubs or gallops. Abdomen: Soft, non-tender, non-distended with normal bowel sounds. L side nephrostomy tube with blood in bag  Musculoskeletal: passive and active ROM x 4 extremities. Positive for edema  Skin: Skin color, texture, turgor normal.  No rashes or lesions. Neurologic:  Neurovascularly intact without any focal sensory/motor deficits.  Cranial nerves: II-XII intact, grossly non-focal.  Psychiatric: Alert and oriented x4, thought content appropriate, normal insight  Capillary Refill: Brisk,< 3 seconds   Peripheral Pulses: +2 palpable, equal bilaterally     Labs: contain minor errors which are inherent in voice recognition technology. **      Final report electronically signed by Dr. Dez Casanova on 4/15/2022 4:18 PM      IR FLUORO GUIDED NEEDLE PLACEMENT   Final Result   Status post successful nontunneled dialysis catheter insertion. **This report has been created using voice recognition software. It may contain minor errors which are inherent in voice recognition technology. **      Final report electronically signed by Dr Elba Manning on 4/15/2022 12:06 PM      XR CHEST PORTABLE   Final Result   1. Left lower lung atelectasis/infiltrate. 2. Mild stable cardiomegaly. **This report has been created using voice recognition software. It may contain minor errors which are inherent in voice recognition technology. **      Final report electronically signed by Dr. Claudette Loth on 4/14/2022 11:54 AM      FL MODIFIED BARIUM SWALLOW W VIDEO   Final Result   Laryngeal penetration and aspiration with thin consistency. Laryngeal penetration with nectar thick. Recommendations available from speech therapy            **This report has been created using voice recognition software. It may contain minor errors which are inherent in voice recognition technology. **      Final report electronically signed by Dr. Dez Casanova on 4/14/2022 12:39 PM      IR GUIDED NEPHROSTOGRAM/URETEROGRAM EXISTING ACCESS   Final Result   The tip of the nephrostomy tube is coiled in the left renal collecting system. **This report has been created using voice recognition software. It may contain minor errors which are inherent in voice recognition technology. **         Final report electronically signed by Dr Elba Manning on 4/14/2022 9:08 AM      CT ABDOMEN PELVIS WO CONTRAST Additional Contrast? None   Final Result   1. Small bilateral pleural effusions with adjacent atelectasis/infiltrate.    2. Left-sided perinephric stranding and fluid collection adjacent to a left-sided nephrostomy tube, likely a subcapsular hematoma. Stable calcifications in the left kidney. 3. Cholelithiasis. 4. Sacral decubitus ulcer. Final report electronically signed by Dr. Ally Garcia on 4/12/2022 5:00 PM      US RENAL COMPLETE   Final Result   1. Significantly limited exam.   2. Increased renal cortical echogenicity bilaterally, compatible with    medical renal disease. 3. A left-sided nephrostomy tube is partially visualized. 4. Multiple left-sided renal calculi. 5. Mild left-sided caliectasis. The left renal pelvis is nondilated. 6. Nonspecific perinephric or subcapsular fluid is noted adjacent to the    left kidney, measuring 5.3 x 2.1 x 1.4 cm. This document has been electronically signed by: Ramos Weinstein M.D. on    04/12/2022 02:30 AM        CT ABDOMEN PELVIS WO CONTRAST Additional Contrast? None    Result Date: 4/12/2022  PROCEDURE: CT ABDOMEN PELVIS WO CONTRAST CLINICAL INFORMATION: Left staghorn calculus TECHNIQUE: CT of the abdomen and pelvis was performed without use of intravenous contrast. Axial images as well as sagittal and coronal reconstructions were obtained. All CT scans at this facility use dose modulation, iterative reconstruction, and/or weight-based dosing when appropriate to reduce radiation dose to as low as reasonably achievable. COMPARISON: CT abdomen and pelvis 3/13/2022 FINDINGS: Lower thorax: There are small bilateral pleural effusions. There is adjacent lung consolidation are present. The heart is enlarged. Abdomen: Evaluation is limited due to absence of contrast. There is no free intraperitoneal air. A gastrostomy tube is in the stomach. A left mid abdominal ostomy is stable and contains loops of bowel. There is no bowel obstruction. Calcifications are present in the lumen of the gallbladder. A nephrostomy tube is now present in the left kidney. Fluid adjacent to the tube and kidney has noncontrast mean Hounsfield units of 12 (image 34). Subcapsular fluid measures up to 2.2 cm in width (image 38). Perinephric stranding is again noted. Calcifications in the left kidney are stable. Left-sided hydronephrosis is not significantly changed. Hypoattenuating lesions in the kidneys may be cysts but are incompletely characterized on the current study. There  are calcified granulomas in the liver and spleen. Atherosclerotic calcifications are present in the abdominal aorta without evidence of aneurysm. There is no mesenteric or retroperitoneal lymphadenopathy. Degenerative changes are seen in the thoracolumbar spine without evidence of aggressive osseous lesions. Pelvis: There is a Padilla catheter in a nondistended urinary bladder, likely accounting for gas in the bladder. Platelets are present in the pelvis. There are calcifications in the uterus. There is no pelvic or inguinal lymphadenopathy. There is persistent subcutaneous tissue irregularity posterior to the sacrum. Degenerative changes are present in the pelvis without evidence of aggressive osseous lesions. 1. Small bilateral pleural effusions with adjacent atelectasis/infiltrate. 2. Left-sided perinephric stranding and fluid collection adjacent to a left-sided nephrostomy tube, likely a subcapsular hematoma. Stable calcifications in the left kidney. 3. Cholelithiasis. 4. Sacral decubitus ulcer. Final report electronically signed by Dr. Sydney Childers on 4/12/2022 5:00 PM    US RENAL COMPLETE    Result Date: 4/12/2022  RENAL ULTRASOUND COMPARISON: 1/3/2022. FINDINGS: Examination is significantly limited due to patient positioning and immobility. Increased renal cortical echogenicity is noted bilaterally, compatible with medical renal disease. Multiple shadowing calculi are noted within the left kidney. For example there is a 2.7 cm stone in the left kidney on image 41. Left-sided nephrostomy tube is partially visualized. Mild caliectasis is noted in the left kidney. Left renal pelvis is nondilated. Nonspecific perinephric or subcapsular fluid is noted adjacent to the left kidney, measuring 5.3 x 2.1 x 1.4 cm on image 49. Urinary bladder contains a Padilla catheter. 1. Significantly limited exam. 2. Increased renal cortical echogenicity bilaterally, compatible with medical renal disease. 3. A left-sided nephrostomy tube is partially visualized. 4. Multiple left-sided renal calculi. 5. Mild left-sided caliectasis. The left renal pelvis is nondilated. 6. Nonspecific perinephric or subcapsular fluid is noted adjacent to the left kidney, measuring 5.3 x 2.1 x 1.4 cm.  This document has been electronically signed by: Derek Rodriguez M.D. on 04/12/2022 02:30 AM      Electronically signed by Ang Knox DO on 4/25/2022 at 1:30 PM

## 2022-04-25 NOTE — PROGRESS NOTES
[facility]  INPATIENT PHYSICAL THERAPY  EVALUATION  STRZ RENAL TELEMETRY 6K - 6K-18/018-A    Time In: 3027  Time Out: 1101  Timed Code Treatment Minutes: 14 Minutes  Minutes: 23          Date: 2022  Patient Name: Maral Mcdonald,  Gender:  female        MRN: [id number]  : 1950  (73 y.o.)      Referring Practitioner: Laquita Lockett DO  Diagnosis: ALVIN (acute kidney injury)  Additional Pertinent Hx: Maral Mcdonald is a 73 y.o., , female presented to [facility] ED for abnormal lab from nursing home. Past medical history significant with chronic diastolic heart failure, obstructive staghorn calculus of the left kidney not a candidate for stone treatment which nephrostomy tube was placed, stage III decubitus ulcer, CKD stage III, general anxiety disorder. Visit patient awake alert and oriented to time person and place. Patient denies any chest pain, chest discomfort, abdominal pain, back pain, neck pain. Patient sister on the bedside report that patient had been having bright red blood in nephrostomy tube over last 3 days. Today patient nursing home notify abnormal lab work which patient was transferred to [facility] ED. Patient is chronic critical ill with PEG tube for nutritional, divers colostomy bag for stage III decubitus ulcer, nephrostomy tube drain bright red blood. Patient living in nursing home, denies alcohol use, tobacco use. Restrictions/Precautions:  Restrictions/Precautions: General Precautions,Fall Risk    Subjective:  Chart Reviewed: Yes  Patient assessed for rehabilitation services?: Yes  Family / Caregiver Present: Yes  Subjective: RN approved session.  Pt pleasant and agreeable to therapy    General:  Overall Orientation Status: Within Functional Limits         Hearing: Within functional limits         Pain: 0/10: denies pain     Vitals: Vitals not assessed per clinical judgement, see nursing flowsheet    Social/Functional History:    Lives With: Alone  Type of Home: Facility  Home Layout: One level  Home Equipment: [employer]             ADL Assistance: Needs assistance  Homemaking Assistance: Needs assistance  Homemaking Responsibilities: No  Ambulation Assistance: Needs assistance  Transfer Assistance: Needs assistance    Active : No  Leisure & Hobbies: Watching TV  Additional Comments: Pt was needing help for all mobility. She indicated that she has used a rolling walker when up with therapy. Unkown how recently she was ambulating. She had help with doing her self care. OBJECTIVE:  Range of Motion:  Bilateral Lower Extremity: Ankle DF limited; all other joints WFL     Strength:  Bilateral Lower Extremity: Impaired - grossly deconditioned    Balance:  Static Sitting Balance:  Contact Guard Assistance  Static Standing Balance: Moderate Assistance, X 1, heavy posterior lean     Bed Mobility:  Supine to Sit: Minimal Assistance, X 1, with head of bed raised, with rail, with verbal cues , with increased time for completion    Transfers:  Sit to Stand: Moderate Assistance, X 1, with increased time for completion, cues for hand placement  Stand to Sit:Moderate Assistance, X 1, with increased time for completion, cues for hand placement    Ambulation:  Moderate Assistance, X 1, with cues for safety, with verbal cues   Distance: 2' from EOB to bedside chair   Surface: Level Tile  Device:Rolling Walker  Gait Deviations:  Slow Janelle, Decreased Step Length Bilaterally, Decreased Gait Speed, Decreased Heel Strike Bilaterally, Wide Base of Support, Moderate Path Deviations and Unsteady Gait  *pt with heavy posterior lean, requires assist with RW placement and maximal verbal cues for safety and sequencing       Functional Outcome Measures: Completed  AM-PAC Inpatient Mobility without Stair Climbing Raw Score : 11  AM-PAC Inpatient without Stair Climbing T-Scale Score : 35.66    ASSESSMENT:  Activity Tolerance:  Patient tolerance of  treatment: good.        Treatment Initiated: Treatment and education initiated within context of evaluation. Evaluation time included review of current medical information, gathering information related to past medical, social and functional history, completion of standardized testing, formal and informal observation of tasks, assessment of data and development of plan of care and goals. Treatment time included skilled education and facilitation of tasks to increase safety and independence with functional mobility for improved independence and quality of life. Assessment: Body Structures, Functions, Activity Limitations Requiring Skilled Therapeutic Intervention: Decreased functional mobility ,Decreased strength,Decreased endurance,Decreased balance,Decreased posture  Assessment: Pt demonstrates a decrease in baseline by way of bed mobility, transfers and ambulation secondary to decreased activity tolerance, strength, fatigue, and balance deficits. Pt will benefit from skilled PT services throughout admission and beyond hospital discharge for improvements in functional mobility and in order to decrease fall risk and return pt to [facility]. Therapy Prognosis: Good    Requires PT Follow-Up: Yes    Discharge Recommendations:  Discharge Recommendations: [address]    Patient Education:   .     Patient Education  Education Given To: Patient  Education Provided: Role of Therapy,Plan of Care  Education Method: Verbal  Barriers to Learning: None      Equipment Recommendations:  Equipment Needed: No    Plan:  Current Treatment Recommendations: Strengthening,Gait training,Endurance training,Functional mobility training,Safety education & training  Plan:  (3-5xGM)    Goals:  Patient goals : none stated  Short Term Goals  Time Frame for Short term goals: by discharge  Short term goal 1: bed mobility with HOB flat, no rails, mod I for increased functional ind  Short term goal 2: sit<>stand from various surfaces with LRD mod I for safe transfers  Short term goal 3: ambulate 21' with LRD Mod I for safe ambulation at d/c site  Long Term Goals  Time Frame for Long term goals : NA d/t short ELOS    Following session, patient left in safe position with all fall risk precautions in place.     Kayode Vallejo PT, DPT

## 2022-04-25 NOTE — CARE COORDINATION
4/25/22, 8:52 AM EDT    DISCHARGE ON GOING EVALUATION    Scott Addison day: 13  Location: [facility]18/018-A Reason for admit: ALVIN (acute kidney injury) ([facility] [N17.9]  Acute renal failure superimposed on chronic kidney disease, unspecified CKD stage, unspecified acute renal failure type ([facility] [facility] [N17.9, N18.9]   Procedure: 4/22 tunneled catheter placement. Barriers to Discharge:  [facility] can [facility] only if patient is able to sit in a reclining chair to dialyze. Transport can only take to/from dialysis if patient can stand/pivot to [facility].   PT/OT was unable to work with Magalis Correa Friday or weekend, they are to work with her today to see if she is able to meet these goals. PCP: Primo Hill MD  Readmission Risk Score: 27.3 ( )%  Patient Goals/Plan/Treatment Preferences: Hopeful return to [place] [id number] with new OP HD at [address] [place] [address]. Update:  Magalis Correa was able to transfer from bed to chair. Dio Barbosa at [facility] notified. He will obtain chair time from [facility]. Anticipate discharge tomorrow at start of OP HD Wednesday.  Electronically signed by Nick Crum RN on 4/25/2022 at 3:17 PM

## 2022-04-25 NOTE — PLAN OF CARE
Problem: Falls - Risk of:  Goal: Will remain free from falls  Description: Will remain free from falls  Outcome: Progressing  Goal: Absence of physical injury  Description: Absence of physical injury  Outcome: Progressing     Problem: Skin Integrity:  Goal: Will show no infection signs and symptoms  Description: Will show no infection signs and symptoms  Outcome: Progressing  Goal: Absence of new skin breakdown  Description: Absence of new skin breakdown  Outcome: Progressing     Problem: Confusion - Acute:  Goal: Absence of continued neurological deterioration signs and symptoms  Description: Absence of continued neurological deterioration signs and symptoms  Outcome: Progressing  Goal: Mental status will be restored to baseline  Description: Mental status will be restored to baseline  Outcome: Progressing     Problem: Discharge Planning:  Goal: Ability to perform activities of daily living will improve  Description: Ability to perform activities of daily living will improve  Outcome: Progressing  Goal: Participates in care planning  Description: Participates in care planning  Outcome: Progressing     Problem: Injury - Risk of, Physical Injury:  Goal: Will remain free from falls  Description: Will remain free from falls  Outcome: Progressing  Goal: Absence of physical injury  Description: Absence of physical injury  Outcome: Progressing     Problem: Mood - Altered:  Goal: Verbalizations of feeling emotionally comfortable while being cared for will increase  Description: Verbalizations of feeling emotionally comfortable while being cared for will increase  Outcome: Progressing     Problem: Psychomotor Activity - Altered:  Goal: Absence of psychomotor disturbance signs and symptoms  Description: Absence of psychomotor disturbance signs and symptoms  Outcome: Progressing     Problem: Sensory Perception - Impaired:  Goal: Demonstrations of improved sensory functioning will increase  Description: Demonstrations of improved sensory functioning will increase  Outcome: Progressing  Goal: Decrease in sensory misperception frequency  Description: Decrease in sensory misperception frequency  Outcome: Progressing  Goal: Able to refrain from responding to false sensory perceptions  Description: Able to refrain from responding to false sensory perceptions  Outcome: Progressing  Goal: Demonstrates accurate environmental perceptions  Description: Demonstrates accurate environmental perceptions  Outcome: Progressing  Goal: Able to distinguish between reality-based and nonreality-based thinking  Description: Able to distinguish between reality-based and nonreality-based thinking  Outcome: Progressing  Goal: Able to interrupt nonreality-based thinking  Description: Able to interrupt nonreality-based thinking  Outcome: Progressing     Problem: Sleep Pattern Disturbance:  Goal: Appears well-rested  Description: Appears well-rested  Outcome: Progressing     Problem: Nutrition  Goal: Optimal nutrition therapy  Outcome: Progressing     Problem: DISCHARGE BARRIERS  Goal: Patient's continuum of care needs are met  Outcome: Progressing     Problem: Pain:  Goal: Pain level will decrease  Description: Pain level will decrease  Outcome: Progressing  Goal: Control of acute pain  Description: Control of acute pain  Outcome: Progressing  Goal: Control of chronic pain  Description: Control of chronic pain  Outcome: Progressing     Problem: Discharge Planning  Goal: Discharge to home or other facility with appropriate resources  Outcome: Progressing     Problem: Chronic Conditions and Co-morbidities  Goal: Patient's chronic conditions and co-morbidity symptoms are monitored and maintained or improved  Outcome: Progressing     Problem: ABCDS Injury Assessment  Goal: Absence of physical injury  Outcome: Progressing     Problem: Nutrition Deficit:  Goal: Optimize nutritional status  Outcome: Progressing

## 2022-04-25 NOTE — PROGRESS NOTES
[facility]         Renal Inpatient Follow-Up note         4/25/2022 10:49 AM    Pt Name:   Sara Coley:   1950  Attending:   Bianka Lucas DO    Chief Complaint : Violet Kerr is a 73 y.o. 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 No distress  Feels ok. No cp or SOB. Some urine output noted  Intradialytic creatinine rising, family at bedside     Scheduled Medications :    metoprolol tartrate  25 mg Oral BID    ciprofloxacin  400 mg IntraVENous Q24H    guaiFENesin  600 mg Oral BID    amoxicillin  250 mg Oral Q24H    sodium hypochlorite   Irrigation Daily    silver nitrate applicators  1 each Topical Once    miconazole   Topical BID    [Held by provider] aspirin  81 mg Oral Daily    [Held by provider] bumetanide  0.5 mg Oral Daily    famotidine  20 mg PEG Tube Every Other Day    ferrous sulfate  325 mg Oral Every Other Day    FLUoxetine  40 mg Oral Daily    sodium chloride flush  5-40 mL IntraVENous 2 times per day      sodium chloride      dextrose      sodium chloride Stopped (04/16/22 0220)    sodium chloride         Vitals :  BP (!) 144/64   Pulse 63   Temp 99 °F (37.2 °C) (Oral)   Resp 18   Ht 4' 9\" (1.448 m)   Wt 157 lb 3 oz (71.3 kg)   SpO2 96%   BMI 34.02 kg/m²     24HR INTAKE/OUTPUT:      Intake/Output Summary (Last 24 hours) at 4/25/2022 1049  Last data filed at 4/25/2022 0025  Gross per 24 hour   Intake 322 ml   Output 585 ml   Net -263 ml     Last 3 weights  Wt Readings from Last 3 Encounters:   04/22/22 157 lb 3 oz (71.3 kg)   03/29/22 134 lb (60.8 kg)   03/13/22 133 lb (60.3 kg)           Physical Exam :  General -- well developed and well nourished  HEENT-normal external appearance of both ears and nose. Mouth/throat  - oropharynx appears to be clear and moist  Eyes-conjunctiva normal no icterus or pallor. Neck-normal range of motion supple no JVD.   Lungs -- clear, diminished  Heart -- S1, S2 heard, JVD - no  Abdomen - soft, non-tender  Extremities -- edema --some dependent edema noted  CNS - awake and alert  Psychiatric-mood and memory appears normal         Last 3 CBC   Recent Labs     04/23/22  0450 04/24/22  0743 04/25/22  0524   WBC 12.7* 11.6* 11.6*   RBC 2.57* 2.69* 2.71*   HGB 7.5* 7.8* 7.9*   HCT 25.1* 26.1* 26.6*    194 195     Last 3 CMP  Recent Labs     04/23/22  0450 04/24/22  0743 04/25/22  0524   * 135 133*   K 4.2 4.3 4.5   CL 98 99 97*   CO2 24 23 25   BUN 49* 63* 71*   CREATININE 2.9* 3.5* 3.9*   CALCIUM 8.5 8.6 8.3*             ASSESSMENT / Plan   1. Renal -acute kidney injury on chronic kidney disease stage III exact etiology is not clear however it appears she may be having volume depletion initially/septic ATN  ? Renal ultrasound scan does not show any acute abnormalities. ? Status post HD Friday intradialytic creatinine rising. ? Volume status stable we will hold dialysis today. Tunneled catheter in place  ? Awaiting outpatient HD set up     2. Electrolytes -within normal limits  3. Mild acidosis secondary to renal dysfunction-improved with dialysis  4. Anemia probably due to bleeding/ renal dysfunction. Stable  5. Hx of chronic diastolic congestive heart failure appears to be well compensated   6. Essential hypertension-stable  7. Meds reviewed and discussed with patient and family at bedside      Dr. Lisa Gamez MD, M,D.  [facility].

## 2022-04-25 NOTE — PROGRESS NOTES
Progress note: Infectious diseases    Patient - Ralph Gill,  Age - 73 y.o.    - 1950      Room Number - 6K-18/018-A   MRN -  [id number]   Acct # - [de-identified]  Date of Admission -  2022  8:20 PM    SUBJECTIVE:   No new issues. She has tunneled catheter. OBJECTIVE   VITALS    height is 4' 9\" (1.448 m) and weight is 157 lb 3 oz (71.3 kg). Her oral temperature is 98.4 °F (36.9 °C). Her blood pressure is 134/57 (abnormal) and her pulse is 70. Her respiration is 18 and oxygen saturation is 100%.        Wt Readings from Last 3 Encounters:   22 157 lb 3 oz (71.3 kg)   22 134 lb (60.8 kg)   22 133 lb (60.3 kg)       I/O (24 Hours)    Intake/Output Summary (Last 24 hours) at 2022 1243  Last data filed at 2022 0025  Gross per 24 hour   Intake 322 ml   Output 585 ml   Net -263 ml       General Appearance  Awake, alert, oriented,  Chronically sick looking  HEENT - normocephalic, atraumatic, pale  conjunctiva,  anicteric sclera  Neck - Supple, no mass  Lungs -  Bilateral   air entry, diminished breath sound at the lung bases  Cardiovascular - Heart sounds are normal.     Abdomen - soft, not distended, nontender,    Neurologic -oriented  Skin - No bruising or bleeding  Extremities - + edema,         MEDICATIONS:      metoprolol tartrate  25 mg Oral BID    ciprofloxacin  400 mg IntraVENous Q24H    guaiFENesin  600 mg Oral BID    amoxicillin  250 mg Oral Q24H    sodium hypochlorite   Irrigation Daily    silver nitrate applicators  1 each Topical Once    miconazole   Topical BID    [Held by provider] aspirin  81 mg Oral Daily    [Held by provider] bumetanide  0.5 mg Oral Daily    famotidine  20 mg PEG Tube Every Other Day    ferrous sulfate  325 mg Oral Every Other Day    FLUoxetine  40 mg Oral Daily    sodium chloride flush  5-40 mL IntraVENous 2 times per day      sodium chloride     

## 2022-04-26 LAB
ANION GAP SERPL CALCULATED.3IONS-SCNC: 10 MEQ/L (ref 8–16)
BASOPHILS # BLD: 0.6 %
BASOPHILS ABSOLUTE: 0.1 THOU/MM3 (ref 0–0.1)
BUN BLDV-MCNC: 82 MG/DL (ref 7–22)
CALCIUM SERPL-MCNC: 8.4 MG/DL (ref 8.5–10.5)
CHLORIDE BLD-SCNC: 97 MEQ/L (ref 98–111)
CO2: 26 MEQ/L (ref 23–33)
CREAT SERPL-MCNC: 4.2 MG/DL (ref 0.4–1.2)
EOSINOPHIL # BLD: 5.6 %
EOSINOPHILS ABSOLUTE: 0.6 THOU/MM3 (ref 0–0.4)
ERYTHROCYTE [DISTWIDTH] IN BLOOD BY AUTOMATED COUNT: 15 % (ref 11.5–14.5)
ERYTHROCYTE [DISTWIDTH] IN BLOOD BY AUTOMATED COUNT: 54.2 FL (ref 35–45)
GFR SERPL CREATININE-BSD FRML MDRD: 10 ML/MIN/1.73M2
GLUCOSE BLD-MCNC: 110 MG/DL (ref 70–108)
GLUCOSE BLD-MCNC: 120 MG/DL (ref 70–108)
GLUCOSE BLD-MCNC: 84 MG/DL (ref 70–108)
GLUCOSE BLD-MCNC: 91 MG/DL (ref 70–108)
GLUCOSE BLD-MCNC: 97 MG/DL (ref 70–108)
HCT VFR BLD CALC: 25.8 % (ref 37–47)
HEMOGLOBIN: 7.5 GM/DL (ref 12–16)
IMMATURE GRANS (ABS): 0.06 THOU/MM3 (ref 0–0.07)
IMMATURE GRANULOCYTES: 0.6 %
LYMPHOCYTES # BLD: 8.1 %
LYMPHOCYTES ABSOLUTE: 0.8 THOU/MM3 (ref 1–4.8)
MCH RBC QN AUTO: 28.7 PG (ref 26–33)
MCHC RBC AUTO-ENTMCNC: 29.1 GM/DL (ref 32.2–35.5)
MCV RBC AUTO: 98.9 FL (ref 81–99)
MONOCYTES # BLD: 10.5 %
MONOCYTES ABSOLUTE: 1.1 THOU/MM3 (ref 0.4–1.3)
NUCLEATED RED BLOOD CELLS: 0 /100 WBC
PLATELET # BLD: 200 THOU/MM3 (ref 130–400)
PMV BLD AUTO: 8.7 FL (ref 9.4–12.4)
POTASSIUM SERPL-SCNC: 5 MEQ/L (ref 3.5–5.2)
RBC # BLD: 2.61 MILL/MM3 (ref 4.2–5.4)
SEG NEUTROPHILS: 74.6 %
SEGMENTED NEUTROPHILS ABSOLUTE COUNT: 7.8 THOU/MM3 (ref 1.8–7.7)
SODIUM BLD-SCNC: 133 MEQ/L (ref 135–145)
WBC # BLD: 10.4 THOU/MM3 (ref 4.8–10.8)

## 2022-04-26 PROCEDURE — 99233 SBSQ HOSP IP/OBS HIGH 50: CPT | Performed by: INTERNAL MEDICINE

## 2022-04-26 PROCEDURE — 36415 COLL VENOUS BLD VENIPUNCTURE: CPT

## 2022-04-26 PROCEDURE — 6370000000 HC RX 637 (ALT 250 FOR IP): Performed by: PHYSICIAN ASSISTANT

## 2022-04-26 PROCEDURE — 82948 REAGENT STRIP/BLOOD GLUCOSE: CPT

## 2022-04-26 PROCEDURE — 90935 HEMODIALYSIS ONE EVALUATION: CPT

## 2022-04-26 PROCEDURE — 6370000000 HC RX 637 (ALT 250 FOR IP): Performed by: STUDENT IN AN ORGANIZED HEALTH CARE EDUCATION/TRAINING PROGRAM

## 2022-04-26 PROCEDURE — 99232 SBSQ HOSP IP/OBS MODERATE 35: CPT | Performed by: INTERNAL MEDICINE

## 2022-04-26 PROCEDURE — 2580000003 HC RX 258: Performed by: STUDENT IN AN ORGANIZED HEALTH CARE EDUCATION/TRAINING PROGRAM

## 2022-04-26 PROCEDURE — 80048 BASIC METABOLIC PNL TOTAL CA: CPT

## 2022-04-26 PROCEDURE — 1200000000 HC SEMI PRIVATE

## 2022-04-26 PROCEDURE — 85025 COMPLETE CBC W/AUTO DIFF WBC: CPT

## 2022-04-26 PROCEDURE — 6360000002 HC RX W HCPCS: Performed by: PHARMACIST

## 2022-04-26 RX ORDER — IPRATROPIUM BROMIDE AND ALBUTEROL SULFATE 2.5; .5 MG/3ML; MG/3ML
3 SOLUTION RESPIRATORY (INHALATION) EVERY 4 HOURS PRN
Qty: 360 ML | Refills: 0 | Status: SHIPPED | OUTPATIENT
Start: 2022-04-26

## 2022-04-26 RX ADMIN — FAMOTIDINE 20 MG: 20 TABLET ORAL at 09:37

## 2022-04-26 RX ADMIN — ACETAMINOPHEN 650 MG: 325 TABLET ORAL at 09:37

## 2022-04-26 RX ADMIN — CIPROFLOXACIN 400 MG: 2 INJECTION, SOLUTION INTRAVENOUS at 21:41

## 2022-04-26 RX ADMIN — MICONAZOLE NITRATE: 20 POWDER TOPICAL at 21:36

## 2022-04-26 RX ADMIN — SODIUM CHLORIDE, PRESERVATIVE FREE 10 ML: 5 INJECTION INTRAVENOUS at 21:37

## 2022-04-26 RX ADMIN — AMOXICILLIN 250 MG: 250 CAPSULE ORAL at 17:52

## 2022-04-26 RX ADMIN — METOPROLOL TARTRATE 25 MG: 25 TABLET, FILM COATED ORAL at 21:38

## 2022-04-26 RX ADMIN — CIPROFLOXACIN 400 MG: 2 INJECTION, SOLUTION INTRAVENOUS at 00:52

## 2022-04-26 RX ADMIN — GUAIFENESIN 600 MG: 600 TABLET, EXTENDED RELEASE ORAL at 21:38

## 2022-04-26 RX ADMIN — SODIUM CHLORIDE, PRESERVATIVE FREE 10 ML: 5 INJECTION INTRAVENOUS at 09:10

## 2022-04-26 ASSESSMENT — PAIN DESCRIPTION - PROGRESSION
CLINICAL_PROGRESSION: GRADUALLY IMPROVING
CLINICAL_PROGRESSION: GRADUALLY IMPROVING

## 2022-04-26 NOTE — PROGRESS NOTES
dependent edema noted  CNS - awake and alert  Psychiatric-mood and memory appears normal         Last 3 CBC   Recent Labs     04/24/22  0743 04/25/22  0524 04/26/22  0525   WBC 11.6* 11.6* 10.4   RBC 2.69* 2.71* 2.61*   HGB 7.8* 7.9* 7.5*   HCT 26.1* 26.6* 25.8*    195 200     Last 3 CMP  Recent Labs     04/24/22  0743 04/25/22  0524 04/26/22  0525    133* 133*   K 4.3 4.5 5.0   CL 99 97* 97*   CO2 23 25 26   BUN 63* 71* 82*   CREATININE 3.5* 3.9* 4.2*   CALCIUM 8.6 8.3* 8.4*             ASSESSMENT / Plan   1. Renal -acute kidney injury on chronic kidney disease stage III exact etiology is not clear however it appears she may be having volume depletion initially/septic ATN  ? Renal ultrasound scan does not show any acute abnormalities. ? Status post HD Friday intradialytic creatinine rising. ? Will get her dialyzed today     2. Electrolytes - mild hyponatremia will be corrected with HD  3. Mild acidosis secondary to renal dysfunction-improved with dialysis  4. Anemia probably due to bleeding/ renal dysfunction. Stable  5. Hx of chronic diastolic congestive heart failure appears to be well compensated   6. Essential hypertension-stable  7. Meds reviewed and discussed with patient       Dr. Tnoya Jackson MD, M,D.  [facility].

## 2022-04-26 NOTE — FLOWSHEET NOTE
04/26/22 1125 04/26/22 1455   Vital Signs   BP (!) 120/56 (!) 122/58   Temp 98.4 °F (36.9 °C) 97.9 °F (36.6 °C)   Pulse 65 64   Resp 18 18   SpO2 100 % 98 %   Weight 157 lb 13.6 oz (71.6 kg) 154 lb 8.7 oz (70.1 kg)   Weight Method Bed scale Bed scale   Percent Weight Change -10.16 -2.09   Post-Hemodialysis Assessment   Post-Treatment Procedures  --  Blood returned;Catheter Capped, clamped with Saline x2 ports   Machine Disinfection Process  --  Acid/Vinegar Clean;Heat Disinfect; Exterior Machine Disinfection   Total Liters Processed (l/min)  --  50.5 l/min   Dialyzer Clearance  --  Heavily streaked   Duration of Treatment (minutes)  --  210 minutes   Heparin amount administered during treatment (units)  --  0 units   Hemodialysis Intake (ml)  --  400 ml   Hemodialysis Output (ml)  --  1900 ml   NET Removed (ml)  --  1500 ml   Tolerated Treatment  --  Good   Stable 3 hour treatment complete. Removed 1.5 liters of fluid as per order. Tolerated fluid removal well. Hemodialysis catheter ports flushed, clamped and capped. Dressing clean dry and intact. Report given to primary RN. Treatment record printed for scanning into EMR.

## 2022-04-26 NOTE — PROGRESS NOTES
Hospitalist Progress Note      Patient:  Mary Big    Unit/Bed:6K-18/018-A  YOB: 1950  MRN: [id number]   Acct: [de-identified]   PCP: Christos Cedillo MD  Date of Admission: 4/11/2022    Assessment/Plan:     ALVIN on CKD stage III, improving: unclear etiology but some volume depletion contributing in additional to diuretic use per Nephro who has been consulted. Renal US without acute abnormalities. Aggressive IVFs. Avoid nephrotoxic agents / contrast. Renal US without acute abnormalities. S/p temporary dialysis catheter and RRT. 4/19: Nephro plans potential tunneled catheter tomorrow, no anticoagulation. Continue with IVF for now. Monitor labs in am for final decision, appreciate Nephro. Acute hypoxic respiratory failure: on 1 L NC with SpO2 96%. Encourage IS / Acapella. CXR shows LLL infiltrate vs atelectasis. Suspect atelectasis with pt's presentation. Wean O2 as tolerated. Hypomagnesemia, resolved: replaced      Hyperkalemia, resolved: Nephrology following. Check daily BMP. Dislodged L nephrostomy tube s/p nephrostogram, resolved: no indication of dislodgement on CT A/P, IR has been consulted for assessment. Previously dislodged and replaced multiple times, last being 3/14/22. L staghorn calculus / bleeding L nephrostomy tube: Urology has been consulted. Repeat CT A/P from 4/12 shows L sided perinephric stranding and fluid collection adjacent to tube which is felt to likely be subcapsular hematoma. Acute cystitis: chronic seaman catheter, UA shows few bacteria, moderate leukocyte esterase and 50-75 with clumps WBC. Urine culture returned growing nonfermenting gram-negative bacilli, enteric gram-negative bacilli, and gram-positive cocci. Pt denies any burning at the site of her catheter. Will discuss with Urology if treatment is indicated at this time. Afebrile, no leukocytosis.     4/18: urine gx returned growing Pseudomonas or [place], Klebsiella pneumoniae and Enterococcus faecalis group D. Continue Cipro and add PO amoxicillin per ID (complete 7 days)     Acute on chronic normocytic anemia: Hg 7.5 on admission with baseline Hg 8.0s-9.0s. Mild-moderate dysphagia: seen by SLP, underwent MBS. Recommended honey thick liquids, no straws, and soft and bite sized. Metabolic acidosis: on sodium bicarbonate per Nephro, continue to monitor. Will correct with HD. Resolved. Elevated troponin: likely related to CKD, denies CP, EKG showed sinus tach with nonspecific ST/T wave change. Chronic HFpEF without decompensation: TOBIAS 10/2021 EF 60% with trace TR. On Bumex 0.5 mg daily which is held due to ALVIN and dehydration     Essential HTN:slightly elevated, continue with home meds and monitor closely      Hx stage III decub ulcer s/p diverting colostomy 1/27/22: Wound care consulted and following      Anxiety: Continue home medications      ED work-up: Afebrile with BP elevate. BMP show hypokalemia with potassium 5.4, serum osmolarity elevate 322.1. EKG showed sinus tachycardia with PVC, St & T wave abnormality. Patient is asymptomatic for chest pain. CBC showed anemia with hemoglobin 7.5 which reduced compared to her baseline at 8 on 9. \"    4/13: pt doing okay, sitting up in bed with family at bedside. Denies fever/chills/CP/SOB. Still has dark blood noted in her nephrostomy bag. Denies any burning at the site of her catheter. 4/14: pt seen having returned from [facility] today and was noted to have episode of nausea/choking / coughing and a small amount of whitish emesis (barium). Pt denied abd pain and denied any SOB at that time. 4/15: pt lying in bed, no complaints. Still with blood in her nephrostomy bag. Denies SOB/cough. No fever/chills. No CP. A&Ox4.     4/16: pt doing fine, lying in bed. Offers no new complaints. Had HD today. Still with blood in her nephrostomy bag. Hgb remains stable.     4/17: pt continues to have these choking like episodes where it appears she cannot breathe. She denies any sort of dysphagia or choking sensation, but notes that she feels nauseated, like \"dry heaving\". No fever/chills. Kidney function improving. Seems in brighter spirits today. 4/18: pt reports she is doing okay. No CP/SOB, working with her acapella. Still has blood in her nephrostomy bag. No fever/chills. 4/19: no new complaints, discussed with nurse. Unable to wean off the 1L NC as pt drops into the 80's. Encouraged to continue with incentive spirometry. Hold ASA / anticoagulation pending possible tunneled cath tomorrow. 4.20.2022 Patient seen this a.m. no complaints discussed the possible need for hemodialysis. On amoxicillin and ciprofloxacin for urinary tract infection. 4.21.2022 patient seen this a.m. no complaints resting comfortably. Creatinine 4.0, CBC is pending, will transfuse if okay with renal.  Tunneled dialysis catheter to be placed, marginal urine output minimal response to diuretics, anticoagulation and antiplatelets are on hold. 4.22.2022 patient seen this a.m. resting comfortably we will transfuse 1 unit of packed RBCs, Tunnel dialysis catheter to be placed today. Creatinine 2.4 today, GFR 20    4.23.2022 patient seen this a.m., was transfused 1 unit of blood during dialysis, recheck after dialysis hemoglobin was 8.5 today its 7.5. Urology is following. Tunneled dialysis catheter was placed yesterday without complications    [id number] seen this a.m. hemoglobin stable at 7.8, possible discharge Monday if case management can obtain hemodialysis chair time. 4.25.2022 patient seen this a.m. discussed with family that she is not participating in PT and OT would have to go to a different nursing home. Will need hemodialysis set up prior to discharge to [facility] ([place], [place])    4.26.2022patient seen today, participating in pt/ot.  Hd chair/time obtained, will dc today to [facility]    Chief Complaint: Abnormal lab    Initial H and P:- \"Kenzie Aguirre is a 73 y.o., , female presented to [facility] ED for abnormal lab from nursing home. Past medical history significant with chronic diastolic heart failure, obstructive staghorn calculus of the left kidney not a candidate for stone treatment which nephrostomy tube was placed, stage III decubitus ulcer, CKD stage III, general anxiety disorder. Visit patient awake alert and oriented to time person and place. Patient denies any chest pain, chest discomfort, abdominal pain, back pain, neck pain. Patient sister on the bedside report that patient had been having bright red blood in nephrostomy tube over last 3 days. Today patient nursing home notify abnormal lab work which patient was transferred to [facility] ED.     Patient is chronic critical ill with PEG tube for nutritional, divers colostomy bag for stage III decubitus ulcer, nephrostomy tube drain bright red blood. Patient living in nursing home, denies alcohol use, tobacco use.     Patient was admitted and managed for ALVIN secondary to dehydration and possible obstruction.        Past medical history, family history, social history and allergies reviewed again and is unchanged since admission. ROS (All review of systems completed. Pertinent positives noted.  Otherwise All other systems reviewed and negative.)     Medications:  Reviewed    Infusion Medications    sodium chloride      dextrose      sodium chloride Stopped (04/16/22 0220)    sodium chloride       Scheduled Medications    metoprolol tartrate  25 mg Oral BID    ciprofloxacin  400 mg IntraVENous Q24H    guaiFENesin  600 mg Oral BID    amoxicillin  250 mg Oral Q24H    sodium hypochlorite   Irrigation Daily    silver nitrate applicators  1 each Topical Once    miconazole   Topical BID    [Held by provider] aspirin  81 mg Oral Daily    [Held by provider] bumetanide  0.5 mg Oral Daily    famotidine  20 mg PEG Tube Every Other Day    ferrous sulfate  325 mg Oral Every Other Day    FLUoxetine  40 mg Oral Daily    sodium chloride flush  5-40 mL IntraVENous 2 times per day     PRN Meds: sodium chloride, ondansetron, ipratropium-albuterol, glucagon (rDNA), dextrose, glucose, dextrose bolus (hypoglycemia) **OR** dextrose bolus (hypoglycemia), acetaminophen **OR** acetaminophen, melatonin, sodium chloride flush, sodium chloride, sodium chloride      Intake/Output Summary (Last 24 hours) at 4/26/2022 0924  Last data filed at 4/26/2022 0553  Gross per 24 hour   Intake 442.57 ml   Output 1076 ml   Net -633.43 ml       Diet:  ADULT DIET; Dysphagia - Soft and Bite Sized; Moderately Thick (Honey); No Drinking Straws  ADULT ORAL NUTRITION SUPPLEMENT; Breakfast, Lunch, Dinner; Frozen Oral Supplement  ADULT TUBE FEEDING; PEG; Renal Formula; Cyclic; 55; 1:60 PM; 9:94 AM; 30; Other (specify); 30 mls before and after cyclic feeding    Exam:  BP (!) 113/54   Pulse 64   Temp 98.9 °F (37.2 °C) (Oral)   Resp 14   Ht 4' 9\" (1.448 m)   Wt 175 lb 11.3 oz (79.7 kg)   SpO2 94%   BMI 38.02 kg/m²   General appearance: chronically ill appearing, no apparent distress, appears stated age and cooperative. HEENT: Pupils equal, round, and reactive to light. Conjunctivae/corneas clear. Neck: Supple, with full range of motion. No jugular venous distention. Trachea midline. Respiratory:  Normal respiratory effort. Clear to auscultation, bilateral crackles in bases  Cardiovascular: Regular rate and rhythm with normal S1/S2 without murmurs, rubs or gallops. Abdomen: Soft, non-tender, non-distended with normal bowel sounds. L side nephrostomy tube with blood in bag  Musculoskeletal: passive and active ROM x 4 extremities. Positive for edema  Skin: Skin color, texture, turgor normal.  No rashes or lesions. Neurologic:  Neurovascularly intact without any focal sensory/motor deficits.  Cranial nerves: II-XII intact, grossly non-focal.  Psychiatric: Alert and oriented x4, thought content appropriate, normal insight  Capillary Refill: Brisk,< 3 seconds   Peripheral Pulses: +2 palpable, equal bilaterally     Labs:   Recent Labs     04/24/22  0743 04/25/22  0524 04/26/22  0525   WBC 11.6* 11.6* 10.4   HGB 7.8* 7.9* 7.5*   HCT 26.1* 26.6* 25.8*    195 200     Recent Labs     04/24/22  0743 04/25/22  0524 04/26/22  0525    133* 133*   K 4.3 4.5 5.0   CL 99 97* 97*   CO2 23 25 26   BUN 63* 71* 82*   CREATININE 3.5* 3.9* 4.2*   CALCIUM 8.6 8.3* 8.4*     No results for input(s): AST, ALT, BILIDIR, BILITOT, ALKPHOS in the last 72 hours. No results for input(s): INR in the last 72 hours. No results for input(s): Julia Height in the last 72 hours. Microbiology:    Blood culture #1:   Lab Results   Component Value Date    BC No growth-preliminary No growth  01/21/2022       Blood culture #2:No results found for: Meme Luna    Organism:  Lab Results   Component Value Date    ORG Pseudomonas aeruginosa 04/12/2022    ORG Klebsiella pneumoniae 04/12/2022    ORG Enterococcus faecalis - (Group D) 04/12/2022         Lab Results   Component Value Date    LABGRAM  02/22/2022     Many segmented neutrophils observed. No epithelial cells observed. No bacteria seen. MRSA culture only:No results found for: [facility]    Urine culture:   Lab Results   Component Value Date    LABURIN No growth-preliminary  01/20/2022    LABURIN Pioneer count: 1,000 CFU/mL  01/20/2022       Respiratory culture: No results found for: CULTRESP    Aerobic and Anaerobic :  Lab Results   Component Value Date    LABAERO  02/22/2022     No growth-preliminary Current antibiotic therapy ineffective in vitro for at least one of culture isolates. LABAERO light growth  02/22/2022    LABAERO  02/22/2022     very light growth Isolates of Methicillin Resistant Staphylococcus coagulase negative (MRSE) do NOT require CONTACT isolation.  Methicillin(Oxacillin)resistant strains of staphylococci (MRSA)or(MRSE)should be considered resistant to all classes of cephalosporins, penems and beta-lactams. Lab Results   Component Value Date    LABANAE  02/22/2022     No anaerobes isolated- preliminary No anaerobes isolated        Urinalysis:      Lab Results   Component Value Date    NITRU NEGATIVE 04/12/2022    WBCUA 50-75W/CLUMPS 04/12/2022    BACTERIA FEW 04/12/2022    RBCUA > 200 04/12/2022    BLOODU LARGE 04/12/2022    SPECGRAV 1.014 01/20/2022    GLUCOSEU NEGATIVE 04/12/2022       Radiology:  IR FLUORO GUIDED CVA DEVICE PLMT/REPLACE/REMOVAL   Final Result   Status post removal of the indwelling non-tunneled dialysis catheter and replacement with a new tunneled dialysis catheter, as outlined above. **This report has been created using voice recognition software. It may contain minor errors which are inherent in voice recognition technology. **      Final report electronically signed by Dr. Maritza Abbott on 4/22/2022 3:00 PM      XR CHEST PORTABLE   Final Result   1. Left retrocardiac opacity may relate to infiltrates versus atelectasis. 2. Increased interstitial markings in both lungs with increased groundglass opacification of both lungs may reflect underlying pulmonary edema. Clinical correlation is recommended. 3. Small bilateral pleural effusions. **This report has been created using voice recognition software. It may contain minor errors which are inherent in voice recognition technology. **      Final report electronically signed by Dr Serjio Tanner on 4/21/2022 10:25 AM      XR CHEST PORTABLE   Final Result   1. Small left pleural effusion. 2. Left lower lobe opacity may relate to infiltrate or atelectasis. 3. Other findings as described above. **This report has been created using voice recognition software. It may contain minor errors which are inherent in voice recognition technology. **      Final report electronically signed by Dr Serjio Tanner on 4/18/2022 4:35 PM      XR FOOT LEFT (2 VIEWS)   Final Result   No acute finding. **This report has been created using voice recognition software. It may contain minor errors which are inherent in voice recognition technology. **      Final report electronically signed by Dr. Crescencio Dubose on 4/15/2022 4:18 PM      IR FLUORO GUIDED NEEDLE PLACEMENT   Final Result   Status post successful nontunneled dialysis catheter insertion. **This report has been created using voice recognition software. It may contain minor errors which are inherent in voice recognition technology. **      Final report electronically signed by Dr Juan Jose Vaughn on 4/15/2022 12:06 PM      XR CHEST PORTABLE   Final Result   1. Left lower lung atelectasis/infiltrate. 2. Mild stable cardiomegaly. **This report has been created using voice recognition software. It may contain minor errors which are inherent in voice recognition technology. **      Final report electronically signed by Dr. Marissa Vera on 4/14/2022 11:54 AM      FL MODIFIED BARIUM SWALLOW W VIDEO   Final Result   Laryngeal penetration and aspiration with thin consistency. Laryngeal penetration with nectar thick. Recommendations available from speech therapy            **This report has been created using voice recognition software. It may contain minor errors which are inherent in voice recognition technology. **      Final report electronically signed by Dr. Crescencio Dubose on 4/14/2022 12:39 PM      IR GUIDED NEPHROSTOGRAM/URETEROGRAM EXISTING ACCESS   Final Result   The tip of the nephrostomy tube is coiled in the left renal collecting system. **This report has been created using voice recognition software. It may contain minor errors which are inherent in voice recognition technology. **         Final report electronically signed by Dr Juan Jose Vaughn on 4/14/2022 9:08 AM      CT ABDOMEN PELVIS WO CONTRAST Additional Contrast? None   Final Result   1.  Small bilateral pleural effusions with adjacent atelectasis/infiltrate. 2. Left-sided perinephric stranding and fluid collection adjacent to a left-sided nephrostomy tube, likely a subcapsular hematoma. Stable calcifications in the left kidney. 3. Cholelithiasis. 4. Sacral decubitus ulcer. Final report electronically signed by Dr. Milton Rivas on 4/12/2022 5:00 PM      US RENAL COMPLETE   Final Result   1. Significantly limited exam.   2. Increased renal cortical echogenicity bilaterally, compatible with    medical renal disease. 3. A left-sided nephrostomy tube is partially visualized. 4. Multiple left-sided renal calculi. 5. Mild left-sided caliectasis. The left renal pelvis is nondilated. 6. Nonspecific perinephric or subcapsular fluid is noted adjacent to the    left kidney, measuring 5.3 x 2.1 x 1.4 cm. This document has been electronically signed by: Rosaline Reardon M.D. on    04/12/2022 02:30 AM        CT ABDOMEN PELVIS WO CONTRAST Additional Contrast? None    Result Date: 4/12/2022  PROCEDURE: CT ABDOMEN PELVIS WO CONTRAST CLINICAL INFORMATION: Left staghorn calculus TECHNIQUE: CT of the abdomen and pelvis was performed without use of intravenous contrast. Axial images as well as sagittal and coronal reconstructions were obtained. All CT scans at this facility use dose modulation, iterative reconstruction, and/or weight-based dosing when appropriate to reduce radiation dose to as low as reasonably achievable. COMPARISON: CT abdomen and pelvis 3/13/2022 FINDINGS: Lower thorax: There are small bilateral pleural effusions. There is adjacent lung consolidation are present. The heart is enlarged. Abdomen: Evaluation is limited due to absence of contrast. There is no free intraperitoneal air. A gastrostomy tube is in the stomach. A left mid abdominal ostomy is stable and contains loops of bowel. There is no bowel obstruction. Calcifications are present in the lumen of the gallbladder.  A nephrostomy tube is now present in the left kidney. Fluid adjacent to the tube and kidney has noncontrast mean Hounsfield units of 12 (image 34). Subcapsular fluid measures up to 2.2 cm in width (image 38). Perinephric stranding is again noted. Calcifications in the left kidney are stable. Left-sided hydronephrosis is not significantly changed. Hypoattenuating lesions in the kidneys may be cysts but are incompletely characterized on the current study. There  are calcified granulomas in the liver and spleen. Atherosclerotic calcifications are present in the abdominal aorta without evidence of aneurysm. There is no mesenteric or retroperitoneal lymphadenopathy. Degenerative changes are seen in the thoracolumbar spine without evidence of aggressive osseous lesions. Pelvis: There is a Padilla catheter in a nondistended urinary bladder, likely accounting for gas in the bladder. Platelets are present in the pelvis. There are calcifications in the uterus. There is no pelvic or inguinal lymphadenopathy. There is persistent subcutaneous tissue irregularity posterior to the sacrum. Degenerative changes are present in the pelvis without evidence of aggressive osseous lesions. 1. Small bilateral pleural effusions with adjacent atelectasis/infiltrate. 2. Left-sided perinephric stranding and fluid collection adjacent to a left-sided nephrostomy tube, likely a subcapsular hematoma. Stable calcifications in the left kidney. 3. Cholelithiasis. 4. Sacral decubitus ulcer. Final report electronically signed by Dr. Messi Boogie on 4/12/2022 5:00 PM    US RENAL COMPLETE    Result Date: 4/12/2022  RENAL ULTRASOUND COMPARISON: 1/3/2022. FINDINGS: Examination is significantly limited due to patient positioning and immobility. Increased renal cortical echogenicity is noted bilaterally, compatible with medical renal disease. Multiple shadowing calculi are noted within the left kidney. For example there is a 2.7 cm stone in the left kidney on image 41.  Left-sided nephrostomy tube is partially visualized. Mild caliectasis is noted in the left kidney. Left renal pelvis is nondilated. Nonspecific perinephric or subcapsular fluid is noted adjacent to the left kidney, measuring 5.3 x 2.1 x 1.4 cm on image 49. Urinary bladder contains a Padilla catheter. 1. Significantly limited exam. 2. Increased renal cortical echogenicity bilaterally, compatible with medical renal disease. 3. A left-sided nephrostomy tube is partially visualized. 4. Multiple left-sided renal calculi. 5. Mild left-sided caliectasis. The left renal pelvis is nondilated. 6. Nonspecific perinephric or subcapsular fluid is noted adjacent to the left kidney, measuring 5.3 x 2.1 x 1.4 cm.  This document has been electronically signed by: Joyce Phillip M.D. on 04/12/2022 02:30 AM      Electronically signed by Dottie Owens DO on 4/26/2022 at 9:24 AM

## 2022-04-26 NOTE — PROGRESS NOTES
CLINICAL PHARMACY: DISCHARGE MED RECONCILIATION/REVIEW    [facility] ([facility]) Select Patient?: No  Total # of Interventions Recommended: 0  Total # Interventions Accepted: 0  Intervention Severity:   - Level 1 Intervention Present?: No   - Level 2 #: 0   - Level 3 #: 0   Time Spent (min): 15      Zain Montejo PharmD, BCPS  4/26/2022  10:21 AM

## 2022-04-26 NOTE — PLAN OF CARE
Problem: Falls - Risk of:  Goal: Will remain free from falls  Description: Will remain free from falls  4/26/2022 0153 by Donal PLASCENCIA  Outcome: Progressing  4/25/2022 1920 by Iman Shukla RN  Outcome: Progressing  Goal: Absence of physical injury  Description: Absence of physical injury  4/26/2022 0153 by Donal PLASCENCIA  Outcome: Progressing  Note: No falls noted this shift. Continue falling star program. Bed alarm on, bed in low position. Call light and personal belongings in reach. Patient uses call light appropriately. 4/25/2022 1920 by Iman Shukla RN  Outcome: Progressing     Problem: Skin Integrity:  Goal: Will show no infection signs and symptoms  Description: Will show no infection signs and symptoms  4/26/2022 0153 by Donal PLASCENCIA  Outcome: Progressing  4/25/2022 1920 by Iamn Shukla RN  Outcome: Progressing  Goal: Absence of new skin breakdown  Description: Absence of new skin breakdown  4/26/2022 0153 by Donal PLASCENCIA  Outcome: Progressing  Note: No new signs or symptoms of skin breakdown noted this shift, encouraging patient to turn and reposition self in bed q2h    4/25/2022 1920 by Iman Shukla RN  Outcome: Progressing     Problem: Confusion - Acute:  Goal: Absence of continued neurological deterioration signs and symptoms  Description: Absence of continued neurological deterioration signs and symptoms  4/26/2022 0153 by Donal PLASCENCIA  Outcome: Progressing  4/25/2022 1920 by Iman Shukla RN  Outcome: Progressing  Goal: Mental status will be restored to baseline  Description: Mental status will be restored to baseline  4/26/2022 0153 by Donal PLASCENCIA  Outcome: Progressing  Note: Patient has been oriented x4 and appropriate this shift.    4/25/2022 1920 by Iman Shukla RN  Outcome: Progressing     Problem: Discharge Planning:  Goal: Ability to perform activities of daily living will improve  Description: Ability to perform activities of daily living will improve  4/26/2022 0153 by Veronica PLASCENCIA  Outcome: Progressing  4/25/2022 1920 by César Hester RN  Outcome: Progressing  Goal: Participates in care planning  Description: Participates in care planning  4/26/2022 0153 by Delio Dobbins  Outcome: Progressing  Note: Discharge arrangements have been made for patient. She will need a Covid swab within 24 hours of discharge. 4/25/2022 1920 by César Hester RN  Outcome: Progressing     Problem: Injury - Risk of, Physical Injury:  Goal: Will remain free from falls  Description: Will remain free from falls  4/26/2022 0153 by Veronica PLASCENCIA  Outcome: Progressing  4/25/2022 1920 by César Hester RN  Outcome: Progressing  Goal: Absence of physical injury  Description: Absence of physical injury  4/26/2022 0153 by Veronica PLASCENCIA  Outcome: Progressing  Note: No falls noted this shift. Continue falling star program. Bed alarm on, bed in low position. Call light and personal belongings in reach. Patient uses call light appropriately. 4/25/2022 1920 by César Hester RN  Outcome: Progressing     Problem: Mood - Altered:  Goal: Verbalizations of feeling emotionally comfortable while being cared for will increase  Description: Verbalizations of feeling emotionally comfortable while being cared for will increase  4/26/2022 0153 by Veronica PLASCENCIA  Outcome: Progressing  Note: Patient has a flat affect.    4/25/2022 1920 by César Hester RN  Outcome: Progressing     Problem: Psychomotor Activity - Altered:  Goal: Absence of psychomotor disturbance signs and symptoms  Description: Absence of psychomotor disturbance signs and symptoms  4/26/2022 0153 by Veronica PLASCECNIA  Outcome: Progressing  4/25/2022 1920 by César Hester RN  Outcome: Progressing     Problem: Sensory Perception - Impaired:  Goal: Demonstrations of improved sensory functioning will increase  Description: Demonstrations of improved sensory functioning will increase  4/26/2022 0153 by Sasha PLASCENCIA  Outcome: Progressing  4/25/2022 1920 by Jennifer Rodriguez RN  Outcome: Progressing  Goal: Decrease in sensory misperception frequency  Description: Decrease in sensory misperception frequency  4/26/2022 0153 by Sasha PLASCENCIA  Outcome: Progressing  4/25/2022 1920 by Jennifer Rodriguez RN  Outcome: Progressing  Goal: Able to refrain from responding to false sensory perceptions  Description: Able to refrain from responding to false sensory perceptions  4/26/2022 0153 by Sasha PLASCENCIA  Outcome: Progressing  4/25/2022 1920 by Jennifer Rodriguez RN  Outcome: Progressing  Goal: Demonstrates accurate environmental perceptions  Description: Demonstrates accurate environmental perceptions  4/26/2022 0153 by Constance Kruse  Outcome: Progressing  4/25/2022 1920 by Jennifer Rodriguez RN  Outcome: Progressing  Goal: Able to distinguish between reality-based and nonreality-based thinking  Description: Able to distinguish between reality-based and nonreality-based thinking  4/26/2022 0153 by Sasha PLASCENCIA  Outcome: Progressing  4/25/2022 1920 by Jennifer Rodriguez RN  Outcome: Progressing  Goal: Able to interrupt nonreality-based thinking  Description: Able to interrupt nonreality-based thinking  4/26/2022 0153 by Sasha PLASCENCIA  Outcome: Progressing  Note: Patient exhibits no signs of impaired sensory perception  4/25/2022 1920 by Jennifer Rodriguez RN  Outcome: Progressing     Problem: Sleep Pattern Disturbance:  Goal: Appears well-rested  Description: Appears well-rested  4/26/2022 0153 by Sasha PLASCENCIA  Outcome: Progressing  Note: Patient is sleeping well at this time. 4/25/2022 1920 by Jennifer Rodriguez RN  Outcome: Progressing     Problem: Nutrition  Goal: Optimal nutrition therapy  4/26/2022 0153 by Sasha PLASCENCIA  Outcome: Progressing  Note: Tube feed running at 55 ml/hr per PEG tube.    4/25/2022 1920 by Jennifer Rodriguez RN  Outcome: Progressing     Problem: DISCHARGE BARRIERS  Goal: Patient's continuum of care needs are met  4/26/2022 0153 by Jose PLASCENCIA  Outcome: Progressing  4/25/2022 1920 by Elsy Bell RN  Outcome: Progressing     Problem: Pain:  Goal: Pain level will decrease  Description: Pain level will decrease  4/26/2022 0153 by Jose PLASCENCIA  Outcome: Progressing  4/25/2022 1920 by Elsy Bell RN  Outcome: Progressing  Goal: Control of acute pain  Description: Control of acute pain  4/26/2022 0153 by Jose PLASCENCIA  Outcome: Progressing  4/25/2022 1920 by Elsy Bell RN  Outcome: Progressing  Goal: Control of chronic pain  Description: Control of chronic pain  4/26/2022 0153 by Jose PLASCENCIA  Outcome: Progressing  Note: Patient voices no complaints of pain.    4/25/2022 1920 by Elsy Bell RN  Outcome: Progressing     Problem: Discharge Planning  Goal: Discharge to home or other facility with appropriate resources  4/26/2022 0153 by Jose PLASCENCIA  Outcome: Progressing  Flowsheets (Taken 4/26/2022 0153)  Discharge to home or other facility with appropriate resources: Arrange for needed discharge resources and transportation as appropriate  4/25/2022 1920 by Elsy Bell RN  Outcome: Progressing     Problem: Chronic Conditions and Co-morbidities  Goal: Patient's chronic conditions and co-morbidity symptoms are monitored and maintained or improved  4/26/2022 0153 by Jose PLASCENCIA  Outcome: Progressing  Flowsheets (Taken 4/26/2022 0153)  Care Plan - Patient's Chronic Conditions and Co-Morbidity Symptoms are Monitored and Maintained or Improved: Collaborate with multidisciplinary team to address chronic and comorbid conditions and prevent exacerbation or deterioration  4/25/2022 1920 by Elsy Bell RN  Outcome: Progressing     Problem: ABCDS Injury Assessment  Goal: Absence of physical injury  4/26/2022 0153 by Hipolito Chaney  Outcome: Progressing  4/25/2022 1920 by Jennifer Rodriguez, RN  Outcome: Progressing     Problem: Nutrition Deficit:  Goal: Optimize nutritional status  4/26/2022 0153 by Sasha PLASCENCIA  Outcome: Progressing  Note: Patient receives tube feed on top of an oral diet.    4/25/2022 1920 by Jennifer Rodriguez, RN  Outcome: Progressing

## 2022-04-26 NOTE — CARE COORDINATION
4/26/22, 9:53 AM EDT    DISCHARGE ON GOING EVALUATION    Julieth Llanos day: 14  Location: [facility] Reason for admit: ALVIN (acute kidney injury) ([facility] [address] [N17.9]  Acute renal failure superimposed on chronic kidney disease, unspecified CKD stage, unspecified acute renal failure type ([facility] [address] [N17.9, N18.9]   Procedure: 4/25 tunneled cath placement   Barriers to Discharge:  ECF states they cannot transport back from dialysis after 4 pm.  Patient's chair time is 2:45 pm MWF. Message left for Sonja Luong at [facility] to see if earlier chair time is available. PCP: Africa Oleary MD  Readmission Risk Score: 28.1 ( )%  Patient Goals/Plan/Treatment Preferences: Return to [place] [id number]. Update: Attempted to contact Sonja Luong at [facility] again, sent to [place]. Kenzie's sister Shar Hanks requests CM see if [facility] has any open dialysis chair times that would work for [facility] to transport. Spoke with Margaret Gregory at [facility], faxed requested documents so she can look into available chair times for patient. She will call CM back after reviewing. Electronically signed by Kendra Tariq RN on 4/26/2022 at 12:27 PM     Update: Sonja Luong from [facility] updated that the only available chair time is the 2:45 pm.  The [facility] clinic is closed today, but the manager is agreeable to reach out to patients tomorrow to see if anyone would be willing to trade. He will get back with CM tomorrow. Radha CHRIS updated. Electronically signed by Kendra Tariq RN on 4/26/2022 at 2:20 PM     Margaret Gregory RN at [facility] dialysis states they do not have a chair that will work for patient's transport needs.   Electronically signed by Kendra Tariq RN on 4/26/2022 at 3:16 PM

## 2022-04-26 NOTE — PLAN OF CARE
Problem: Nutrition  Goal: Optimal nutrition therapy  4/26/2022 1228 by Nghia Whipple RD, LD  Outcome: Progressing   Nutrition Problem #1: Inadequate oral intake  Intervention: Food and/or Nutrient Delivery: Continue Current Diet,Continue Oral Nutrition Supplement,Continue Current Tube Feeding  Nutritional Goals: Patient will receive 75% or more of estimated nutrient needs via diet and EN during LOS.

## 2022-04-26 NOTE — CARE COORDINATION
4/26/22, 12:19 PM EDT    DISCHARGE PLANNING EVALUATION    SW notified of chair time MWF at 2:45pm at [facility]. SW updated Loc Bernardo at [facility] and she stated that they would be able to get Patient to dialysis but not able to provide a return transport. Carol Lopez stated that [facility] will only transport to last  time of 4pm.     DOT contacted [employer] and they have no transports available to leave [facility]. DOT spoke with [employer] in [place] and they only have car transports in the area. DOT left message with [employer]. CM working with Shaqisaiah Carmel from [employer] regarding dialysis times. DOT updated sister Bang Dejesus and she was unsure about switching ECF's and mentioned possible [facility] for dialysis. DOT spoke with Carol Lopez from [facility] and they would be able to transport [facility] anytime after 8am till  at 4pm.     CM updated.

## 2022-04-27 LAB
ANION GAP SERPL CALCULATED.3IONS-SCNC: 9 MEQ/L (ref 8–16)
BASOPHILS # BLD: 0.7 %
BASOPHILS ABSOLUTE: 0.1 THOU/MM3 (ref 0–0.1)
BUN BLDV-MCNC: 41 MG/DL (ref 7–22)
CALCIUM SERPL-MCNC: 8.2 MG/DL (ref 8.5–10.5)
CHLORIDE BLD-SCNC: 100 MEQ/L (ref 98–111)
CO2: 26 MEQ/L (ref 23–33)
CREAT SERPL-MCNC: 2.5 MG/DL (ref 0.4–1.2)
EOSINOPHIL # BLD: 6.7 %
EOSINOPHILS ABSOLUTE: 0.6 THOU/MM3 (ref 0–0.4)
ERYTHROCYTE [DISTWIDTH] IN BLOOD BY AUTOMATED COUNT: 14.8 % (ref 11.5–14.5)
ERYTHROCYTE [DISTWIDTH] IN BLOOD BY AUTOMATED COUNT: 54.2 FL (ref 35–45)
GFR SERPL CREATININE-BSD FRML MDRD: 19 ML/MIN/1.73M2
GLUCOSE BLD-MCNC: 100 MG/DL (ref 70–108)
GLUCOSE BLD-MCNC: 119 MG/DL (ref 70–108)
GLUCOSE BLD-MCNC: 91 MG/DL (ref 70–108)
GLUCOSE BLD-MCNC: 98 MG/DL (ref 70–108)
HCT VFR BLD CALC: 25.1 % (ref 37–47)
HEMOGLOBIN: 7.3 GM/DL (ref 12–16)
IMMATURE GRANS (ABS): 0.04 THOU/MM3 (ref 0–0.07)
IMMATURE GRANULOCYTES: 0.5 %
LYMPHOCYTES # BLD: 8.1 %
LYMPHOCYTES ABSOLUTE: 0.7 THOU/MM3 (ref 1–4.8)
MCH RBC QN AUTO: 29.1 PG (ref 26–33)
MCHC RBC AUTO-ENTMCNC: 29.1 GM/DL (ref 32.2–35.5)
MCV RBC AUTO: 100 FL (ref 81–99)
MONOCYTES # BLD: 8.9 %
MONOCYTES ABSOLUTE: 0.8 THOU/MM3 (ref 0.4–1.3)
NUCLEATED RED BLOOD CELLS: 0 /100 WBC
PLATELET # BLD: 205 THOU/MM3 (ref 130–400)
PMV BLD AUTO: 9 FL (ref 9.4–12.4)
POTASSIUM SERPL-SCNC: 4.3 MEQ/L (ref 3.5–5.2)
RBC # BLD: 2.51 MILL/MM3 (ref 4.2–5.4)
SEG NEUTROPHILS: 75.1 %
SEGMENTED NEUTROPHILS ABSOLUTE COUNT: 6.7 THOU/MM3 (ref 1.8–7.7)
SODIUM BLD-SCNC: 135 MEQ/L (ref 135–145)
WBC # BLD: 8.9 THOU/MM3 (ref 4.8–10.8)

## 2022-04-27 PROCEDURE — 6370000000 HC RX 637 (ALT 250 FOR IP): Performed by: STUDENT IN AN ORGANIZED HEALTH CARE EDUCATION/TRAINING PROGRAM

## 2022-04-27 PROCEDURE — 99232 SBSQ HOSP IP/OBS MODERATE 35: CPT | Performed by: INTERNAL MEDICINE

## 2022-04-27 PROCEDURE — 99233 SBSQ HOSP IP/OBS HIGH 50: CPT | Performed by: INTERNAL MEDICINE

## 2022-04-27 PROCEDURE — 36415 COLL VENOUS BLD VENIPUNCTURE: CPT

## 2022-04-27 PROCEDURE — 82948 REAGENT STRIP/BLOOD GLUCOSE: CPT

## 2022-04-27 PROCEDURE — 1200000000 HC SEMI PRIVATE

## 2022-04-27 PROCEDURE — 6370000000 HC RX 637 (ALT 250 FOR IP): Performed by: PHYSICIAN ASSISTANT

## 2022-04-27 PROCEDURE — 80048 BASIC METABOLIC PNL TOTAL CA: CPT

## 2022-04-27 PROCEDURE — 2580000003 HC RX 258: Performed by: STUDENT IN AN ORGANIZED HEALTH CARE EDUCATION/TRAINING PROGRAM

## 2022-04-27 PROCEDURE — 85025 COMPLETE CBC W/AUTO DIFF WBC: CPT

## 2022-04-27 PROCEDURE — 97110 THERAPEUTIC EXERCISES: CPT

## 2022-04-27 RX ADMIN — GUAIFENESIN 600 MG: 600 TABLET, EXTENDED RELEASE ORAL at 20:45

## 2022-04-27 RX ADMIN — MICONAZOLE NITRATE: 20 POWDER TOPICAL at 20:48

## 2022-04-27 RX ADMIN — GUAIFENESIN 600 MG: 600 TABLET, EXTENDED RELEASE ORAL at 09:13

## 2022-04-27 RX ADMIN — FLUOXETINE 40 MG: 20 CAPSULE ORAL at 09:13

## 2022-04-27 RX ADMIN — MICONAZOLE NITRATE: 20 POWDER TOPICAL at 09:14

## 2022-04-27 RX ADMIN — AMOXICILLIN 250 MG: 250 CAPSULE ORAL at 15:33

## 2022-04-27 RX ADMIN — SODIUM CHLORIDE, PRESERVATIVE FREE 10 ML: 5 INJECTION INTRAVENOUS at 09:14

## 2022-04-27 RX ADMIN — DAKIN'S SOLUTION 0.125% (QUARTER STRENGTH): 0.12 SOLUTION at 09:14

## 2022-04-27 RX ADMIN — METOPROLOL TARTRATE 25 MG: 25 TABLET, FILM COATED ORAL at 20:45

## 2022-04-27 RX ADMIN — SODIUM CHLORIDE, PRESERVATIVE FREE 10 ML: 5 INJECTION INTRAVENOUS at 20:45

## 2022-04-27 RX ADMIN — METOPROLOL TARTRATE 25 MG: 25 TABLET, FILM COATED ORAL at 09:13

## 2022-04-27 RX ADMIN — FAMOTIDINE 20 MG: 20 TABLET ORAL at 09:13

## 2022-04-27 ASSESSMENT — PAIN SCALES - GENERAL: PAINLEVEL_OUTOF10: 0

## 2022-04-27 ASSESSMENT — PAIN DESCRIPTION - PROGRESSION
CLINICAL_PROGRESSION: GRADUALLY IMPROVING

## 2022-04-27 NOTE — PROGRESS NOTES
obstructive staghorn calculus of the left kidney not a candidate for stone treatment which nephrostomy tube was placed, stage III decubitus ulcer, CKD stage III, general anxiety disorder.  Visit patient awake alert and oriented to time person and place.  Patient denies any chest pain, chest discomfort, abdominal pain, back pain, neck pain.  Patient sister on the bedside report that patient had been having bright red blood in nephrostomy tube over last 3 days.  [facility] notify abnormal lab work which patient was transferred to [facility].     Patient is chronic critical ill with PEG tube for nutritional, divers colostomy bag for stage III decubitus ulcer, nephrostomy tube drain bright red blood.  Patient living in nursing home, denies alcohol use, tobacco use.     Patient was admitted and managed for ALVIN secondary to dehydration and possible obstruction.     Subjective (past 24 hours):     Feels better. Denies any NVD. No fevers or chills. No new complaints. Past medical history, family history, social history and allergies reviewed again and is unchanged since admission. ROS (All review of systems completed. Pertinent positives noted.  Otherwise All other systems reviewed and negative.)     Medications:  Reviewed    Infusion Medications    sodium chloride      dextrose      sodium chloride Stopped (04/16/22 0220)    sodium chloride       Scheduled Medications    metoprolol tartrate  25 mg Oral BID    ciprofloxacin  400 mg IntraVENous Q24H    guaiFENesin  600 mg Oral BID    amoxicillin  250 mg Oral Q24H    sodium hypochlorite   Irrigation Daily    silver nitrate applicators  1 each Topical Once    miconazole   Topical BID    [Held by provider] aspirin  81 mg Oral Daily    [Held by provider] bumetanide  0.5 mg Oral Daily    famotidine  20 mg PEG Tube Every Other Day    ferrous sulfate  325 mg Oral Every Other Day    FLUoxetine  40 mg Oral Daily    sodium chloride flush  5-40 mL IntraVENous 2 times per day     PRN Meds: sodium chloride, ondansetron, ipratropium-albuterol, glucagon (rDNA), dextrose, glucose, dextrose bolus (hypoglycemia) **OR** dextrose bolus (hypoglycemia), acetaminophen **OR** acetaminophen, melatonin, sodium chloride flush, sodium chloride, sodium chloride      Intake/Output Summary (Last 24 hours) at 4/27/2022 1553  Last data filed at 4/27/2022 1519  Gross per 24 hour   Intake 710 ml   Output 1280 ml   Net -570 ml       Diet:  ADULT DIET; Dysphagia - Soft and Bite Sized; Moderately Thick (Honey); No Drinking Straws  ADULT ORAL NUTRITION SUPPLEMENT; Breakfast, Lunch, Dinner; Frozen Oral Supplement  ADULT TUBE FEEDING; PEG; Renal Formula; Cyclic; 55; 2:48 PM; 3:21 AM; 30; Other (specify); 30 mls before and after cyclic feeding    Exam:  BP (!) 113/47   Pulse 61   Temp 98.6 °F (37 °C) (Oral)   Resp 16   Ht 4' 9\" (1.448 m)   Wt 154 lb (69.9 kg)   SpO2 100%   BMI 33.33 kg/m²   General appearance: No apparent distress, appears stated age and cooperative. HEENT: Pupils equal, round, and reactive to light. Conjunctivae/corneas clear. Neck: Supple, with full range of motion. No jugular venous distention. Trachea midline. Respiratory:  Normal respiratory effort. Clear to auscultation, bilaterally without Rales/Wheezes/Rhonchi. Cardiovascular: Regular rate and rhythm with normal S1/S2 without murmurs, rubs or gallops. Abdomen: Soft, non-tender, non-distended with normal bowel sounds. : Left nephrostomy tube in place  Musculoskeletal: passive and active ROM x 4 extremities. Skin: Skin color, texture, turgor normal.  No rashes or lesions. Neurologic:  Neurovascularly intact without any focal sensory/motor deficits.  Cranial nerves: II-XII intact, grossly non-focal.  Psychiatric: Alert and oriented, thought content appropriate, normal insight  Capillary Refill: Brisk,< 3 seconds   Peripheral Pulses: +2 palpable, equal bilaterally     Labs:   Recent Labs 04/25/22  0524 04/26/22  0525 04/27/22  0420   WBC 11.6* 10.4 8.9   HGB 7.9* 7.5* 7.3*   HCT 26.6* 25.8* 25.1*    200 205     Recent Labs     04/25/22  0524 04/26/22  0525 04/27/22  0420   * 133* 135   K 4.5 5.0 4.3   CL 97* 97* 100   CO2 25 26 26   BUN 71* 82* 41*   CREATININE 3.9* 4.2* 2.5*   CALCIUM 8.3* 8.4* 8.2*     No results for input(s): AST, ALT, BILIDIR, BILITOT, ALKPHOS in the last 72 hours. No results for input(s): INR in the last 72 hours. No results for input(s): Darleen Shanks in the last 72 hours. Microbiology:    Blood culture #1:   Lab Results   Component Value Date    BC No growth-preliminary No growth  01/21/2022       Blood culture #2:No results found for: Heather Saleh    Organism:  Lab Results   Component Value Date    ORG Pseudomonas aeruginosa 04/12/2022    ORG Klebsiella pneumoniae 04/12/2022    ORG Enterococcus faecalis - (Group D) 04/12/2022         Lab Results   Component Value Date    LABGRAM  02/22/2022     Many segmented neutrophils observed. No epithelial cells observed. No bacteria seen. MRSA culture only:No results found for: [facility]    Urine culture:   Lab Results   Component Value Date    LABURIN No growth-preliminary  01/20/2022    LABURIN Kramer count: 1,000 CFU/mL  01/20/2022       Respiratory culture: No results found for: CULTRESP    Aerobic and Anaerobic :  Lab Results   Component Value Date    LABAERO  02/22/2022     No growth-preliminary Current antibiotic therapy ineffective in vitro for at least one of culture isolates. LABAERO light growth  02/22/2022    LABAERO  02/22/2022     very light growth Isolates of Methicillin Resistant Staphylococcus coagulase negative (MRSE) do NOT require CONTACT isolation. Methicillin(Oxacillin)resistant strains of staphylococci (MRSA)or(MRSE)should be considered resistant to all classes of cephalosporins, penems and beta-lactams.       Lab Results   Component Value Date    LABANAE  02/22/2022     No anaerobes isolated- preliminary No anaerobes isolated        Urinalysis:      Lab Results   Component Value Date    NITRU NEGATIVE 04/12/2022    WBCUA 50-75W/CLUMPS 04/12/2022    BACTERIA FEW 04/12/2022    RBCUA > 200 04/12/2022    BLOODU LARGE 04/12/2022    SPECGRAV 1.014 01/20/2022    GLUCOSEU NEGATIVE 04/12/2022       Radiology:  IR FLUORO GUIDED CVA DEVICE PLMT/REPLACE/REMOVAL   Final Result   Status post removal of the indwelling non-tunneled dialysis catheter and replacement with a new tunneled dialysis catheter, as outlined above. **This report has been created using voice recognition software. It may contain minor errors which are inherent in voice recognition technology. **      Final report electronically signed by Dr. Jonny Myers on 4/22/2022 3:00 PM      XR CHEST PORTABLE   Final Result   1. Left retrocardiac opacity may relate to infiltrates versus atelectasis. 2. Increased interstitial markings in both lungs with increased groundglass opacification of both lungs may reflect underlying pulmonary edema. Clinical correlation is recommended. 3. Small bilateral pleural effusions. **This report has been created using voice recognition software. It may contain minor errors which are inherent in voice recognition technology. **      Final report electronically signed by Dr Joe Fonseca on 4/21/2022 10:25 AM      XR CHEST PORTABLE   Final Result   1. Small left pleural effusion. 2. Left lower lobe opacity may relate to infiltrate or atelectasis. 3. Other findings as described above. **This report has been created using voice recognition software. It may contain minor errors which are inherent in voice recognition technology. **      Final report electronically signed by Dr Joe Fonseca on 4/18/2022 4:35 PM      XR FOOT LEFT (2 VIEWS)   Final Result   No acute finding. **This report has been created using voice recognition software.   It may contain minor errors which are inherent in voice recognition technology. **      Final report electronically signed by Dr. Roman Nielsen on 4/15/2022 4:18 PM      IR FLUORO GUIDED NEEDLE PLACEMENT   Final Result   Status post successful nontunneled dialysis catheter insertion. **This report has been created using voice recognition software. It may contain minor errors which are inherent in voice recognition technology. **      Final report electronically signed by Dr Juliet Pardo on 4/15/2022 12:06 PM      XR CHEST PORTABLE   Final Result   1. Left lower lung atelectasis/infiltrate. 2. Mild stable cardiomegaly. **This report has been created using voice recognition software. It may contain minor errors which are inherent in voice recognition technology. **      Final report electronically signed by Dr. Winifred Maguire on 4/14/2022 11:54 AM      FL MODIFIED BARIUM SWALLOW W VIDEO   Final Result   Laryngeal penetration and aspiration with thin consistency. Laryngeal penetration with nectar thick. Recommendations available from speech therapy            **This report has been created using voice recognition software. It may contain minor errors which are inherent in voice recognition technology. **      Final report electronically signed by Dr. Roman Nielsen on 4/14/2022 12:39 PM      IR GUIDED NEPHROSTOGRAM/URETEROGRAM EXISTING ACCESS   Final Result   The tip of the nephrostomy tube is coiled in the left renal collecting system. **This report has been created using voice recognition software. It may contain minor errors which are inherent in voice recognition technology. **         Final report electronically signed by Dr Juliet Pardo on 4/14/2022 9:08 AM      CT ABDOMEN PELVIS WO CONTRAST Additional Contrast? None   Final Result   1. Small bilateral pleural effusions with adjacent atelectasis/infiltrate.    2. Left-sided perinephric stranding and fluid collection adjacent to a left-sided nephrostomy tube, likely a subcapsular hematoma. Stable calcifications in the left kidney. 3. Cholelithiasis. 4. Sacral decubitus ulcer. Final report electronically signed by Dr. Sylvain Hargrove on 4/12/2022 5:00 PM      US RENAL COMPLETE   Final Result   1. Significantly limited exam.   2. Increased renal cortical echogenicity bilaterally, compatible with    medical renal disease. 3. A left-sided nephrostomy tube is partially visualized. 4. Multiple left-sided renal calculi. 5. Mild left-sided caliectasis. The left renal pelvis is nondilated. 6. Nonspecific perinephric or subcapsular fluid is noted adjacent to the    left kidney, measuring 5.3 x 2.1 x 1.4 cm. This document has been electronically signed by: Kerri Reza M.D. on    04/12/2022 02:30 AM        CT ABDOMEN PELVIS WO CONTRAST Additional Contrast? None    Result Date: 4/12/2022  PROCEDURE: CT ABDOMEN PELVIS WO CONTRAST CLINICAL INFORMATION: Left staghorn calculus TECHNIQUE: CT of the abdomen and pelvis was performed without use of intravenous contrast. Axial images as well as sagittal and coronal reconstructions were obtained. All CT scans at this facility use dose modulation, iterative reconstruction, and/or weight-based dosing when appropriate to reduce radiation dose to as low as reasonably achievable. COMPARISON: CT abdomen and pelvis 3/13/2022 FINDINGS: Lower thorax: There are small bilateral pleural effusions. There is adjacent lung consolidation are present. The heart is enlarged. Abdomen: Evaluation is limited due to absence of contrast. There is no free intraperitoneal air. A gastrostomy tube is in the stomach. A left mid abdominal ostomy is stable and contains loops of bowel. There is no bowel obstruction. Calcifications are present in the lumen of the gallbladder. A nephrostomy tube is now present in the left kidney. Fluid adjacent to the tube and kidney has noncontrast mean Hounsfield units of 12 (image 34).  Subcapsular fluid measures up to 2.2 cm in width (image 38). Perinephric stranding is again noted. Calcifications in the left kidney are stable. Left-sided hydronephrosis is not significantly changed. Hypoattenuating lesions in the kidneys may be cysts but are incompletely characterized on the current study. There  are calcified granulomas in the liver and spleen. Atherosclerotic calcifications are present in the abdominal aorta without evidence of aneurysm. There is no mesenteric or retroperitoneal lymphadenopathy. Degenerative changes are seen in the thoracolumbar spine without evidence of aggressive osseous lesions. Pelvis: There is a Padilla catheter in a nondistended urinary bladder, likely accounting for gas in the bladder. Platelets are present in the pelvis. There are calcifications in the uterus. There is no pelvic or inguinal lymphadenopathy. There is persistent subcutaneous tissue irregularity posterior to the sacrum. Degenerative changes are present in the pelvis without evidence of aggressive osseous lesions. 1. Small bilateral pleural effusions with adjacent atelectasis/infiltrate. 2. Left-sided perinephric stranding and fluid collection adjacent to a left-sided nephrostomy tube, likely a subcapsular hematoma. Stable calcifications in the left kidney. 3. Cholelithiasis. 4. Sacral decubitus ulcer. Final report electronically signed by Dr. Shannan Escobar on 4/12/2022 5:00 PM    US RENAL COMPLETE    Result Date: 4/12/2022  RENAL ULTRASOUND COMPARISON: 1/3/2022. FINDINGS: Examination is significantly limited due to patient positioning and immobility. Increased renal cortical echogenicity is noted bilaterally, compatible with medical renal disease. Multiple shadowing calculi are noted within the left kidney. For example there is a 2.7 cm stone in the left kidney on image 41. Left-sided nephrostomy tube is partially visualized. Mild caliectasis is noted in the left kidney. Left renal pelvis is nondilated.  Nonspecific perinephric or subcapsular fluid is noted adjacent to the left kidney, measuring 5.3 x 2.1 x 1.4 cm on image 49. Urinary bladder contains a Padilla catheter. 1. Significantly limited exam. 2. Increased renal cortical echogenicity bilaterally, compatible with medical renal disease. 3. A left-sided nephrostomy tube is partially visualized. 4. Multiple left-sided renal calculi. 5. Mild left-sided caliectasis. The left renal pelvis is nondilated. 6. Nonspecific perinephric or subcapsular fluid is noted adjacent to the left kidney, measuring 5.3 x 2.1 x 1.4 cm.  This document has been electronically signed by: Yovany Coombs M.D. on 04/12/2022 02:30 AM      Electronically signed by Bindu Simon MD on 4/27/2022 at 3:53 PM

## 2022-04-27 NOTE — PROGRESS NOTES
[facility]         Renal Inpatient Follow-Up note         4/27/2022 9:46 AM    Pt Name:   Kristen Sell:   1950  Attending:   Jose A Bowers MD    Chief Complaint : Alba Nix is a 73 y.o. 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 No distress  Feels ok. No cp or SOB. Some urine output noted. Awake and alert     Scheduled Medications :    metoprolol tartrate  25 mg Oral BID    ciprofloxacin  400 mg IntraVENous Q24H    guaiFENesin  600 mg Oral BID    amoxicillin  250 mg Oral Q24H    sodium hypochlorite   Irrigation Daily    silver nitrate applicators  1 each Topical Once    miconazole   Topical BID    [Held by provider] aspirin  81 mg Oral Daily    [Held by provider] bumetanide  0.5 mg Oral Daily    famotidine  20 mg PEG Tube Every Other Day    ferrous sulfate  325 mg Oral Every Other Day    FLUoxetine  40 mg Oral Daily    sodium chloride flush  5-40 mL IntraVENous 2 times per day      sodium chloride      dextrose      sodium chloride Stopped (04/16/22 0220)    sodium chloride         Vitals :  /61   Pulse 66   Temp 98.2 °F (36.8 °C) (Oral)   Resp 16   Ht 4' 9\" (1.448 m)   Wt 154 lb (69.9 kg)   SpO2 100%   BMI 33.33 kg/m²     24HR INTAKE/OUTPUT:      Intake/Output Summary (Last 24 hours) at 4/27/2022 0946  Last data filed at 4/27/2022 0612  Gross per 24 hour   Intake 990 ml   Output 2680 ml   Net -1690 ml     Last 3 weights  Wt Readings from Last 3 Encounters:   04/27/22 154 lb (69.9 kg)   03/29/22 134 lb (60.8 kg)   03/13/22 133 lb (60.3 kg)           Physical Exam :  General -- well developed and well nourished  HEENT-normal external appearance of both ears and nose.   Mouth/throat  - oropharynx appears to be clear and moist  Lungs -- clear, diminished  Heart -- S1, S2 heard, JVD - no  Abdomen - soft, non-tender  Extremities -- edema --some dependent edema noted  CNS - awake and alert  Psychiatric-mood and memory appears normal         Last 3 CBC   Recent Labs     04/25/22  0524 04/26/22  0525 04/27/22  0420   WBC 11.6* 10.4 8.9   RBC 2.71* 2.61* 2.51*   HGB 7.9* 7.5* 7.3*   HCT 26.6* 25.8* 25.1*    200 205     Last 3 CMP  Recent Labs     04/25/22  0524 04/26/22  0525 04/27/22  0420   * 133* 135   K 4.5 5.0 4.3   CL 97* 97* 100   CO2 25 26 26   BUN 71* 82* 41*   CREATININE 3.9* 4.2* 2.5*   CALCIUM 8.3* 8.4* 8.2*             ASSESSMENT / Plan   1. Renal -acute kidney injury on chronic kidney disease stage III exact etiology is not clear however it appears she may be having volume depletion initially/septic ATN  ? Renal ultrasound scan does not show any acute abnormalities. ? Status post HD yesterday . ? No HD today . awaiting placement. Monitor BMP     2. Electrolytes -within normal limits  3. Mild acidosis currently resolved  4. Anemia probably due to bleeding/ renal dysfunction. Stable. Start PARRIS  5. Hx of chronic diastolic congestive heart failure appears to be well compensated   6. Essential hypertension-stable  7. Meds reviewed and discussed with patient       Dr. Darleen Guerrier MD, M,D.  [facility].

## 2022-04-27 NOTE — CARE COORDINATION
4/27/22, 1:16 PM EDT    DISCHARGE ON GOING EVALUATION    Bridget Yana day: 15  Location: [facility]18/018-A Reason for admit: ALVIN (acute kidney injury) ([facility] [address] [N17.9]  Acute renal failure superimposed on chronic kidney disease, unspecified CKD stage, unspecified acute renal failure type ([facility] [address] [N17.9, N18.9]   Barriers to Discharge: Message left for Gentry Flores at [facility] at [phone] to see if Raya Martinez has been able to find a new chair time for patient, and to inform that [facility] could also transport to [facility] if chair time is between [phone]. Await callback. PCP: Molly Lucas MD  Readmission Risk Score: 27.3 ( )%  Patient Goals/Plan/Treatment Preferences: return to [place] [id number]. *Attempted to contact Gentry Flores again, call went directly to voicemail.  Electronically signed by Yung Jasso RN on 4/27/2022 at 1:19 PM

## 2022-04-27 NOTE — PROGRESS NOTES
[address] RENAL TELEMETRY 6K  Occupational Therapy  Daily Note  Time:    Time In: 2942  Time Out: 9273  Timed Code Treatment Minutes: 5 Minutes  Minutes: 15          Date: 2022  Patient Name: Stephy Barger,   Gender: female      Room: [facility]18/018-A  MRN: [id number]  : 1950  (73 y.o.)  Referring Practitioner: Dr. Marveen Collet, DO  Diagnosis: ALVIN  Additional Pertinent Hx: Pt presented to [facility] ED for abnormal lab from nursing home. Past medical history significant with chronic diastolic heart failure, obstructive staghorn calculus of the left kidney not a candidate for stone treatment which nephrostomy tube was placed, stage III decubitus ulcer, CKD stage III, general anxiety disorder. Visit patient awake alert and oriented to time person and place. Patient denies any chest pain, chest discomfort, abdominal pain, back pain, neck pain. Patient sister on the bedside report that patient had been having bright red blood in nephrostomy tube over last 3 days. Today patient nursing home notify abnormal lab work which patient was transferred to [facility] ED. Restrictions/Precautions:  Restrictions/Precautions: General Precautions,Fall Risk      SUBJECTIVE: Pt sleeping in bed stating she was tired. Even with max encouragement, pt declining any OOB tasks. Agreeable to completion of bed ex. PAIN: 0 /10:      Vitals: Vitals not assessed per clinical judgement, see nursing flowsheet    COGNITION: Slow Processing    ADL:   No ADL's completed this session. Bonne Major BALANCE:  pt declining any OOB tasks    BED MOBILITY:  Not Tested    TRANSFERS:  Pt declining any OOB tasks     FUNCTIONAL MOBILITY:  Pt declining any OOB tasks     ADDITIONAL ACTIVITIES:  Pt completed bilateral UE light strengthening ex using theraband to increase UB strength and endurance for ADL tasks. Pt with slow response to demo from therapist requiring assistance from therapist to hold end of band to complete ex.  Pt with decreased strength making it difficulty to complete ex with resistance. Pt would benefit from further ex of AROM to increase her strength and endurance further prior to use of theraband    ASSESSMENT:     Activity Tolerance:  Patient tolerance of  treatment: fair. Discharge Recommendations: Subacute/skilled nursing facility  Equipment Recommendations: Equipment Needed: No  Plan: Times per Week: 3-5x  Specific Instructions for Next Treatment: Functional transfers as able; sitting activity; UE exercises; orientation to place, situation  Current Treatment Recommendations: Functional mobility training,Balance training,Strengthening,Endurance training,Cognitive reorientation,Patient/Caregiver education & training,Self-Care / ADL  Plan Comment: Pt would benefit from continued skilled OT services when medically stable and discharged from Acute. OT recommended while at [facility]. Patient Education  Patient Education: strengthening ex    Goals  Short Term Goals  Time Frame for Short term goals: By discharge  Short Term Goal 1: Pt will demonstrate sitting ADLs for over 5 minute duration with no > than MIN A for balance to increase her endurance and core strenth for ease of doing self care. Short Term Goal 2: Pt will be oriented to place and situation daily with verbal cues as needed to increase her safety and facilitate progress torward her goals. Short Term Goal 3: Pt will complete BUE ROM/light resistance exercises while following verbal cues for steady breathing to increase her endurance and strength for ease of doing ADLs and functional mobility. Short Term Goal 4: Pt will demonstrate functional transfers with OTR to prepare for doing self care while OOB. Following session, patient left in safe position with all fall risk precautions in place.

## 2022-04-28 VITALS
TEMPERATURE: 99.3 F | BODY MASS INDEX: 37.24 KG/M2 | DIASTOLIC BLOOD PRESSURE: 68 MMHG | HEIGHT: 57 IN | WEIGHT: 172.62 LBS | SYSTOLIC BLOOD PRESSURE: 137 MMHG | HEART RATE: 63 BPM | RESPIRATION RATE: 18 BRPM | OXYGEN SATURATION: 100 %

## 2022-04-28 LAB
ANION GAP SERPL CALCULATED.3IONS-SCNC: 12 MEQ/L (ref 8–16)
BASOPHILS # BLD: 0.9 %
BASOPHILS ABSOLUTE: 0.1 THOU/MM3 (ref 0–0.1)
BUN BLDV-MCNC: 55 MG/DL (ref 7–22)
CALCIUM SERPL-MCNC: 8.4 MG/DL (ref 8.5–10.5)
CHLORIDE BLD-SCNC: 98 MEQ/L (ref 98–111)
CO2: 23 MEQ/L (ref 23–33)
CREAT SERPL-MCNC: 2.9 MG/DL (ref 0.4–1.2)
EOSINOPHIL # BLD: 7.2 %
EOSINOPHILS ABSOLUTE: 0.6 THOU/MM3 (ref 0–0.4)
ERYTHROCYTE [DISTWIDTH] IN BLOOD BY AUTOMATED COUNT: 14.6 % (ref 11.5–14.5)
ERYTHROCYTE [DISTWIDTH] IN BLOOD BY AUTOMATED COUNT: 53.4 FL (ref 35–45)
GFR SERPL CREATININE-BSD FRML MDRD: 16 ML/MIN/1.73M2
GLUCOSE BLD-MCNC: 104 MG/DL (ref 70–108)
GLUCOSE BLD-MCNC: 109 MG/DL (ref 70–108)
GLUCOSE BLD-MCNC: 135 MG/DL (ref 70–108)
GLUCOSE BLD-MCNC: 79 MG/DL (ref 70–108)
HCT VFR BLD CALC: 25.8 % (ref 37–47)
HEMOGLOBIN: 7.6 GM/DL (ref 12–16)
IMMATURE GRANS (ABS): 0.05 THOU/MM3 (ref 0–0.07)
IMMATURE GRANULOCYTES: 0.6 %
LYMPHOCYTES # BLD: 10 %
LYMPHOCYTES ABSOLUTE: 0.9 THOU/MM3 (ref 1–4.8)
MCH RBC QN AUTO: 29.2 PG (ref 26–33)
MCHC RBC AUTO-ENTMCNC: 29.5 GM/DL (ref 32.2–35.5)
MCV RBC AUTO: 99.2 FL (ref 81–99)
MONOCYTES # BLD: 9.3 %
MONOCYTES ABSOLUTE: 0.8 THOU/MM3 (ref 0.4–1.3)
NUCLEATED RED BLOOD CELLS: 0 /100 WBC
PLATELET # BLD: 230 THOU/MM3 (ref 130–400)
PMV BLD AUTO: 8.7 FL (ref 9.4–12.4)
POTASSIUM SERPL-SCNC: 4.7 MEQ/L (ref 3.5–5.2)
RBC # BLD: 2.6 MILL/MM3 (ref 4.2–5.4)
SEG NEUTROPHILS: 72 %
SEGMENTED NEUTROPHILS ABSOLUTE COUNT: 6.4 THOU/MM3 (ref 1.8–7.7)
SODIUM BLD-SCNC: 133 MEQ/L (ref 135–145)
WBC # BLD: 8.9 THOU/MM3 (ref 4.8–10.8)

## 2022-04-28 PROCEDURE — 97530 THERAPEUTIC ACTIVITIES: CPT

## 2022-04-28 PROCEDURE — 85025 COMPLETE CBC W/AUTO DIFF WBC: CPT

## 2022-04-28 PROCEDURE — 6370000000 HC RX 637 (ALT 250 FOR IP): Performed by: STUDENT IN AN ORGANIZED HEALTH CARE EDUCATION/TRAINING PROGRAM

## 2022-04-28 PROCEDURE — 36415 COLL VENOUS BLD VENIPUNCTURE: CPT

## 2022-04-28 PROCEDURE — 80048 BASIC METABOLIC PNL TOTAL CA: CPT

## 2022-04-28 PROCEDURE — 6370000000 HC RX 637 (ALT 250 FOR IP): Performed by: PHYSICIAN ASSISTANT

## 2022-04-28 PROCEDURE — 99239 HOSP IP/OBS DSCHRG MGMT >30: CPT | Performed by: INTERNAL MEDICINE

## 2022-04-28 PROCEDURE — 99232 SBSQ HOSP IP/OBS MODERATE 35: CPT | Performed by: INTERNAL MEDICINE

## 2022-04-28 PROCEDURE — 2580000003 HC RX 258: Performed by: STUDENT IN AN ORGANIZED HEALTH CARE EDUCATION/TRAINING PROGRAM

## 2022-04-28 PROCEDURE — 82948 REAGENT STRIP/BLOOD GLUCOSE: CPT

## 2022-04-28 PROCEDURE — 97110 THERAPEUTIC EXERCISES: CPT

## 2022-04-28 RX ADMIN — SODIUM CHLORIDE, PRESERVATIVE FREE 10 ML: 5 INJECTION INTRAVENOUS at 08:54

## 2022-04-28 RX ADMIN — GUAIFENESIN 600 MG: 600 TABLET, EXTENDED RELEASE ORAL at 08:53

## 2022-04-28 RX ADMIN — MICONAZOLE NITRATE: 20 POWDER TOPICAL at 08:54

## 2022-04-28 RX ADMIN — FERROUS SULFATE TAB 325 MG (65 MG ELEMENTAL FE) 325 MG: 325 (65 FE) TAB at 08:53

## 2022-04-28 RX ADMIN — DAKIN'S SOLUTION 0.125% (QUARTER STRENGTH): 0.12 SOLUTION at 08:54

## 2022-04-28 RX ADMIN — METOPROLOL TARTRATE 25 MG: 25 TABLET, FILM COATED ORAL at 08:53

## 2022-04-28 RX ADMIN — FLUOXETINE 40 MG: 20 CAPSULE ORAL at 08:53

## 2022-04-28 ASSESSMENT — PAIN DESCRIPTION - PROGRESSION
CLINICAL_PROGRESSION: GRADUALLY IMPROVING

## 2022-04-28 NOTE — PROGRESS NOTES
[address] [address]  INPATIENT PHYSICAL THERAPY  DAILY NOTE  [facility] RENAL TELEMETRY 6K - 6K-18/018-A     Time In: 8388  Time Out: 1683  Timed Code Treatment Minutes: 32 Minutes  Minutes: 27          Date: 2022  Patient Name: Mary Ferrara,  Gender:  female        MRN: [id number]  : 1950  (73 y.o.)     Referring Practitioner: Dottie Owens DO  Diagnosis: ALVIN (acute kidney injury)  Additional Pertinent Hx: Mary Ferrara is a 73 y.o., , female presented to [facility] ED for abnormal lab from nursing home. Past medical history significant with chronic diastolic heart failure, obstructive staghorn calculus of the left kidney not a candidate for stone treatment which nephrostomy tube was placed, stage III decubitus ulcer, CKD stage III, general anxiety disorder. Visit patient awake alert and oriented to time person and place. Patient denies any chest pain, chest discomfort, abdominal pain, back pain, neck pain. Patient sister on the bedside report that patient had been having bright red blood in nephrostomy tube over last 3 days. Today patient nursing home notify abnormal lab work which patient was transferred to [facility] ED. Patient is chronic critical ill with PEG tube for nutritional, divers colostomy bag for stage III decubitus ulcer, nephrostomy tube drain bright red blood. Patient living in nursing home, denies alcohol use, tobacco use. Prior Level of Function:  Lives With: Alone  Type of Home: Facility  Home Layout: One level  Home Equipment: [employer]        ADL Assistance: Needs assistance  Homemaking Assistance: Needs assistance  Homemaking Responsibilities: No  Ambulation Assistance: Needs assistance  Transfer Assistance: Needs assistance  Active : No  Additional Comments: Pt was needing help for all mobility. She indicated that she has used a rolling walker when up with therapy. Unkown how recently she was ambulating.   She had help with doing her self care.    Restrictions/Precautions:  Restrictions/Precautions: General Precautions,Fall Risk      SUBJECTIVE: Pt resting in bed and agrees to try transfers to see if it would be safe for her sister to be able to transport her for dialysis. PAIN: not reported      Vitals: Vitals not assessed per clinical judgement, see nursing flowsheet    OBJECTIVE:  Bed Mobility:  Rolling to Left: Minimal Assistance, with head of bed flat, with rail, with verbal cues    Supine to Sit: Moderate Assistance, X 1, with head of bed raised, with rail, with verbal cues , with increased time for completion  Sit to Supine: Maximum Assistance, X 1, with head of bed flat, assist for LEs onto bed     Transfers:  Sit to Stand: Moderate Assistance, X 1, cues for hand placement, with verbal cues, to correct posture (tuck in your bottom)  Stand to Sit:Minimal Assistance, X 1, cues for hand placement  Stand Pivot:Minimal Assistance, Moderate Assistance, X 1, with increased time for completion, with verbal cues, for posture   Scooting back into seat: Mod A with verbal cues to lean then shift opposite hip with very limited success. Ambulation:  Minimal Assistance, Moderate Assistance, X 1  Distance: 2 ft x 2  Surface: Level Tile  Device:Rolling Walker  Gait Deviations: Forward Flexed Posture, Decreased Step Length Bilaterally, Decreased Gait Speed, Decreased Heel Strike Bilaterally, Unsteady Gait and Decreased Terminal Knee Extension    Balance:  Static Sitting Balance:  Stand By Assistance  Dynamic Sitting Balance: Contact Guard Assistance  Static Standing Balance: Minimal Assistance  Dynamic Standing Balance: Moderate Assistance    Exercise:  Patient was guided in 1 set(s) 10 reps of exercise to both lower extremities. Ankle pumps, Glut sets, Quad sets, Heelslides and Hip abduction/adduction. Exercises were completed for increased independence with functional mobility.     Functional Outcome Measures: Completed  AM-PAC Inpatient Mobility without Stair Climbing Raw Score : 10  AM-PAC Inpatient without Stair Climbing T-Scale Score : 34.07    ASSESSMENT:  Assessment: Patient progressing toward established goals. and would not be safe to have sister provide transportation for dialysis due to amount of assist.  Activity Tolerance:  Patient tolerance of  treatment: good. Equipment Recommendations:Equipment Needed: No  Discharge Recommendations: Continue to assess pending progress and ECF with PT  Plan: Current Treatment Recommendations: Strengthening,Gait training,Endurance training,Functional mobility training,Safety education & training  Plan:  (3-5xGM)    Patient Education  Patient Education: Plan of Care, Home Exercise Program, Transfers    Goals:  Patient goals : none stated  Short Term Goals  Time Frame for Short term goals: by discharge  Short term goal 1: bed mobility with HOB flat, no rails, mod I for increased functional ind  Short term goal 2: sit<>stand from various surfaces with LRD mod I for safe transfers  Short term goal 3: ambulate 21' with LRD Mod I for safe ambulation at d/c site  Long Term Goals  Time Frame for Long term goals : NA d/t short ELOS    Following session, patient left in safe position with all fall risk precautions in place. Valentino Risk.  [facility] [place], [place] [address]

## 2022-04-28 NOTE — CARE COORDINATION
4/28/22, 8:28 AM EDT    DISCHARGE ON GOING EVALUATION    Bijan Hennessy day: 16  Location: [facility]18/018-A Reason for admit: ALVIN (acute kidney injury) ([facility] [address] [N17.9]  Acute renal failure superimposed on chronic kidney disease, unspecified CKD stage, unspecified acute renal failure type ([facility] [address] [N17.9, N18.9]   Barriers to Discharge: Spoke with Almita Vizcarra, no one was willing to trade chair times with Valerie Schultz. He will check to see if [facility] has anything open in the 9 am-11 am range to assist with transportation availability. Sabrina Bonilla PT to request therapy work with Valerie Schultz today on getting into and out of car to see if it would be feasible for her sister to assist with transport. PCP: Harrison Mauricio MD  Readmission Risk Score: 26.9 ( )%  Patient Goals/Plan/Treatment Preferences: Return to [address] [id number] once dialysis transportation issues are resolved .

## 2022-04-28 NOTE — PLAN OF CARE
Problem: Falls - Risk of:  Goal: Will remain free from falls  Description: Will remain free from falls  4/28/2022 0223 by Jaden PLASCENCIA  Outcome: Progressing  4/28/2022 0214 by Jaden PLASCENCIA  Outcome: Progressing  Goal: Absence of physical injury  Description: Absence of physical injury  4/28/2022 0223 by Jaden PLASCENCIA  Outcome: Progressing  Note: No falls noted this shift. Continue falling star program. Bed alarm on, bed in low position. Call light and personal belongings in reach. Patient uses call light appropriately. 4/28/2022 0214 by Jaden PLASCENCIA  Outcome: Progressing  Note: No falls noted this shift. Continue falling star program. Bed alarm on, bed in low position. Call light and personal belongings in reach. Patient uses call light appropriately.        Problem: Skin Integrity:  Goal: Will show no infection signs and symptoms  Description: Will show no infection signs and symptoms  4/28/2022 0223 by Martinez So  Outcome: Progressing  4/28/2022 0214 by Martinez So  Outcome: Progressing  Goal: Absence of new skin breakdown  Description: Absence of new skin breakdown  4/28/2022 0223 by Jaden PLASCENCIA  Outcome: Progressing  Note: No new signs or symptoms of skin breakdown noted this shift, encouraging patient to turn and reposition self in bed q2h   4/28/2022 0214 by Martinez So  Outcome: Progressing  Note: No new signs or symptoms of skin breakdown noted this shift, encouraging patient to turn and reposition self in bed q2h       Problem: Confusion - Acute:  Goal: Absence of continued neurological deterioration signs and symptoms  Description: Absence of continued neurological deterioration signs and symptoms  4/28/2022 0223 by Jaden PLASCENCIA  Outcome: Progressing  4/28/2022 0214 by Jaden PLASCENCIA  Outcome: Progressing  Goal: Mental status will be restored to baseline  Description: Mental status will be restored to baseline  4/28/2022 0223 by Howard PLASCENCIA  Outcome: Progressing  Note: Patient is alert and oriented x4.  4/28/2022 0214 by Howard PLASCENCIA  Outcome: Progressing  Note: Patient is alert and oriented x4      Problem: Discharge Planning:  Goal: Ability to perform activities of daily living will improve  Description: Ability to perform activities of daily living will improve  4/28/2022 0223 by Sarai Méndez  Outcome: Progressing  4/28/2022 0214 by Sarai Méndez  Outcome: Progressing  Goal: Participates in care planning  Description: Participates in care planning  4/28/2022 0223 by Sarai Méndez  Outcome: Progressing  Note: Patient plans to be discharged to [address] [address] when medically stable. 4/28/2022 0214 by Howard PLASCENCIA  Outcome: Progressing  Note: Patient plans to be discharged to [address] [address] when medically stable. Problem: Injury - Risk of, Physical Injury:  Goal: Will remain free from falls  Description: Will remain free from falls  4/28/2022 0223 by Hwoard PLASCENCIA  Outcome: Progressing  4/28/2022 0214 by Howard PLASCENCIA  Outcome: Progressing  Goal: Absence of physical injury  Description: Absence of physical injury  4/28/2022 0223 by Howard PLASCENCIA  Outcome: Progressing  Note: No falls noted this shift. Continue falling star program. Bed alarm on, bed in low position. Call light and personal belongings in reach. Patient uses call light appropriately. 4/28/2022 0214 by Howard PLASCENCIA  Outcome: Progressing  Note: No falls noted this shift. Continue falling star program. Bed alarm on, bed in low position. Call light and personal belongings in reach. Patient uses call light appropriately.        Problem: Mood - Altered:  Goal: Verbalizations of feeling emotionally comfortable while being cared for will increase  Description: Verbalizations of feeling emotionally comfortable while being cared for will increase  4/28/2022 0223 by Howard PLASCENCIA  Outcome: Progressing  Note: Patient voices no feelings of discomfort with her care. 4/28/2022 0214 by Wilfred PLASCENCIA  Outcome: Progressing  Note: Patient appears to be comfortable with the care she receives, she voices no opposition and participates in decision making. Problem: Psychomotor Activity - Altered:  Goal: Absence of psychomotor disturbance signs and symptoms  Description: Absence of psychomotor disturbance signs and symptoms  4/28/2022 0223 by Wilfred PLASCENCIA  Outcome: Progressing  Note: Patient is absent of psychomotor disturbance signs and symptoms. 4/28/2022 0214 by Wilfred PLASCENCIA  Outcome: Progressing  Note: No signs nor symptoms of psychomotor disturbances noted.       Problem: Sensory Perception - Impaired:  Goal: Demonstrations of improved sensory functioning will increase  Description: Demonstrations of improved sensory functioning will increase  4/28/2022 0223 by Wilfred PLASCENCIA  Outcome: Progressing  4/28/2022 0214 by Abelardo Moore  Outcome: Progressing  Goal: Decrease in sensory misperception frequency  Description: Decrease in sensory misperception frequency  4/28/2022 0223 by Wilfred PLASCENCIA  Outcome: Progressing  4/28/2022 0214 by Abelardo Moore  Outcome: Progressing  Goal: Able to refrain from responding to false sensory perceptions  Description: Able to refrain from responding to false sensory perceptions  4/28/2022 0223 by Abelardo Moore  Outcome: Progressing  4/28/2022 0214 by Abelardo Moore  Outcome: Progressing  Goal: Demonstrates accurate environmental perceptions  Description: Demonstrates accurate environmental perceptions  4/28/2022 0223 by Abelardo Moore  Outcome: Progressing  4/28/2022 0214 by Abelardo Moore  Outcome: Progressing  Goal: Able to distinguish between reality-based and nonreality-based thinking  Description: Able to distinguish between reality-based and nonreality-based thinking  4/28/2022 0223 by Wilfred Barnes TEMO  Outcome: Progressing  4/28/2022 0214 by Casey Jarvis  Outcome: Progressing  Goal: Able to interrupt nonreality-based thinking  Description: Able to interrupt nonreality-based thinking  4/28/2022 0223 by Casey Jarvis  Outcome: Progressing  4/28/2022 0214 by Suzette PLASCENCIA  Outcome: Progressing  Note: Patient exhibits no signs of impaired sensory perception. Problem: Sleep Pattern Disturbance:  Goal: Appears well-rested  Description: Appears well-rested  4/28/2022 0223 by Casey Jarvis  Outcome: Progressing  Note: Patient sleeps well at night.   4/28/2022 0214 by Suzette PLASCENCIA  Outcome: Progressing  Note: Patient sleeps well during the night shift. Problem: Nutrition  Goal: Optimal nutrition therapy  4/28/2022 0223 by Casey Jarvis  Outcome: Progressing  4/28/2022 0214 by Suzette PLASCENCIA  Outcome: Progressing  Note: Patient eats a regular meal and she is receiving tube feedings supplementally. Problem: DISCHARGE BARRIERS  Goal: Patient's continuum of care needs are met  4/28/2022 0223 by Suzette PLASCENCIA  Outcome: Progressing  Note: Patient will need a Covid swab less than 24 hours before discharge. 4/28/2022 0214 by Suzette PLASCENCIA  Outcome: Progressing  Note: Patient will discharge back to the skilled nursing facility she resided in before her admission. Problem: Pain:  Goal: Pain level will decrease  Description: Pain level will decrease  4/28/2022 0223 by Suzette PLASCENCIA  Outcome: Progressing  4/28/2022 0214 by Suzette PLASCENCIA  Outcome: Progressing  Goal: Control of acute pain  Description: Control of acute pain  4/28/2022 0223 by Casey Jarvis  Outcome: Progressing  4/28/2022 0214 by Suzette PLASCENCIA  Outcome: Progressing  Goal: Control of chronic pain  Description: Control of chronic pain  4/28/2022 0223 by Suzette PLASCENCIA  Outcome: Progressing  Note: Patient free from pain this shift.  Pain rated on 0-10 pain rating scale. Will continue to reassess. 4/28/2022 0214 by Donal PLASCENCIA  Outcome: Progressing  Note: Patient free from pain this shift. Pain rated on 0-10 pain rating scale. Will continue to reassess. Problem: Discharge Planning  Goal: Discharge to home or other facility with appropriate resources  4/28/2022 0223 by Joe Green  Outcome: Progressing  4/28/2022 0214 by Donal PLASCENCIA  Outcome: Progressing     Problem: Chronic Conditions and Co-morbidities  Goal: Patient's chronic conditions and co-morbidity symptoms are monitored and maintained or improved  4/28/2022 0223 by Donal PLASCENCIA  Outcome: Progressing  Flowsheets (Taken 4/28/2022 0223)  Care Plan - Patient's Chronic Conditions and Co-Morbidity Symptoms are Monitored and Maintained or Improved:   Monitor and assess patient's chronic conditions and comorbid symptoms for stability, deterioration, or improvement   Collaborate with multidisciplinary team to address chronic and comorbid conditions and prevent exacerbation or deterioration   Update acute care plan with appropriate goals if chronic or comorbid symptoms are exacerbated and prevent overall improvement and discharge  4/28/2022 0214 by Donal PLASCENCIA  Outcome: Progressing  Flowsheets (Taken 4/26/2022 0153)  Care Plan - Patient's Chronic Conditions and Co-Morbidity Symptoms are Monitored and Maintained or Improved: Collaborate with multidisciplinary team to address chronic and comorbid conditions and prevent exacerbation or deterioration     Problem: ABCDS Injury Assessment  Goal: Absence of physical injury  4/28/2022 0223 by Donal PLASCENCIA  Outcome: Progressing  Note: Patient is free of injury, safety measures in place. 4/28/2022 0214 by Donal PLASCENCIA  Outcome: Progressing  Note: Patient remains free of physical injury. Safety measures in place.       Problem: Nutrition Deficit:  Goal: Optimize nutritional status  4/28/2022 0223 by Donal Srivastava TEMO  Outcome: Progressing  4/28/2022 0214 by Arsh PLASCENCIA  Outcome: Progressing     Problem: Neurosensory - Adult  Goal: Achieves stable or improved neurological status  4/28/2022 0223 by Arsh PLASCENCIA  Outcome: Progressing  4/28/2022 0214 by Alex Mazariegos  Outcome: Progressing  Goal: Achieves maximal functionality and self care  4/28/2022 0223 by Alex Mazariegos  Outcome: Progressing  Flowsheets (Taken 4/28/2022 0223)  Achieves maximal functionality and self care: Encourage and assist patient to increase activity and self care with guidance from physical therapy/occupational therapy  4/28/2022 0214 by Arsh PLASCENCIA  Outcome: Progressing     Problem: Skin/Tissue Integrity - Adult  Goal: Skin integrity remains intact  4/28/2022 0223 by Alex Mazariegos  Outcome: Progressing  4/28/2022 0214 by Arsh PLASCENCIA  Outcome: Progressing  Goal: Incisions, wounds, or drain sites healing without S/S of infection  4/28/2022 0223 by Arsh PLASCENCIA  Outcome: Progressing  Flowsheets (Taken 4/28/2022 0223)  Incisions, Wounds, or Drain Sites Healing Without Sign and Symptoms of Infection:   Initiate pressure ulcer prevention bundle as indicated   Initiate isolation precautions as appropriate   Implement wound care per orders   ADMISSION and DAILY: Assess and document risk factors for pressure ulcer development  4/28/2022 0214 by Arsh PLASCENCIA  Outcome: Progressing     Problem: Musculoskeletal - Adult  Goal: Return mobility to safest level of function  4/28/2022 0223 by Alex Mazariegos  Outcome: Progressing  4/28/2022 0214 by Alex Mazariegos  Outcome: Progressing  Goal: Maintain proper alignment of affected body part  4/28/2022 0223 by Arsh PLASCENCIA  Outcome: Progressing  4/28/2022 0214 by Alex Mazariegos  Outcome: Progressing  Goal: Return ADL status to a safe level of function  4/28/2022 0223 by Arsh PLASCENCIA  Outcome: Progressing  Flowsheets (Taken 4/28/2022 3047)  Return ADL Status to a Safe Level of Function:   Administer medication as ordered   Assess activities of daily living deficits and provide assistive devices as needed   Obtain physical therapy/occupational therapy consults as needed   Assist and instruct patient to increase activity and self care as tolerated  4/28/2022 0214 by Jaden PLASCENCIA  Outcome: Progressing     Problem: Gastrointestinal - Adult  Goal: Minimal or absence of nausea and vomiting  4/28/2022 0223 by Martinez So  Outcome: Progressing  4/28/2022 0214 by Martinez So  Outcome: Progressing  Goal: Maintains or returns to baseline bowel function  4/28/2022 0223 by Martinez So  Outcome: Progressing  4/28/2022 0214 by Martinez So  Outcome: Progressing  Goal: Maintains adequate nutritional intake  4/28/2022 0223 by Martinez So  Outcome: Progressing  4/28/2022 0214 by Martinez So  Outcome: Progressing  Goal: Establish and maintain optimal ostomy function  4/28/2022 0223 by Martinez So  Outcome: Progressing  Flowsheets (Taken 4/28/2022 0223)  Establish and maintain optimal ostomy function:   Monitor output from ostomies   Administer IV fluids and TPN as ordered   Introduce and advance enteral feedings as ordered  4/28/2022 0214 by Jaden PLASCENCIA  Outcome: Progressing     Problem: Genitourinary - Adult  Goal: Absence of urinary retention  4/28/2022 0223 by Martinez So  Outcome: Progressing  4/28/2022 0214 by Martinez So  Outcome: Progressing  Goal: Urinary catheter remains patent  4/28/2022 0223 by Martinez So  Outcome: Progressing  Flowsheets (Taken 4/28/2022 0223)  Urinary catheter remains patent: Assess patency of urinary catheter  4/28/2022 0214 by Jaden PLASCENCIA  Outcome: Progressing     Problem: Infection - Adult  Goal: Absence of infection at discharge  4/28/2022 0223 by Martinez So  Outcome: Progressing  4/28/2022 0214 by Jaden Kaur TEMO  Outcome: Progressing  Goal: Absence of infection during hospitalization  4/28/2022 0223 by Link PLASCENCIA  Outcome: Progressing  Flowsheets (Taken 4/28/2022 0223)  Absence of infection during hospitalization:   Assess and monitor for signs and symptoms of infection   Monitor lab/diagnostic results   Monitor all insertion sites i.e., indwelling lines, tubes and drains   Administer medications as ordered   Instruct and encourage patient and family to use good hand hygiene technique   Identify and instruct in appropriate isolation precautions for identified infection/condition  4/28/2022 0214 by Link PLASCENCIA  Outcome: Progressing     Problem: Metabolic/Fluid and Electrolytes - Adult  Goal: Electrolytes maintained within normal limits  4/28/2022 0223 by Link PLASCENCIA  Outcome: Progressing  4/28/2022 0214 by Lucía Doll  Outcome: Progressing  Goal: Hemodynamic stability and optimal renal function maintained  4/28/2022 0223 by Lucía Doll  Outcome: Progressing  4/28/2022 0214 by Lucía Doll  Outcome: Progressing  Goal: Glucose maintained within prescribed range  4/28/2022 0223 by Link PLASCENCIA  Outcome: Progressing  Flowsheets (Taken 4/28/2022 0223)  Glucose maintained within prescribed range:   Monitor blood glucose as ordered   Assess for signs and symptoms of hyperglycemia and hypoglycemia   Administer ordered medications to maintain glucose within target range   Assess barriers to adequate nutritional intake and initiate nutrition consult as needed   Instruct patient on self management of diabetes and initiate consult as needed  4/28/2022 0214 by Link PLASCENCIA  Outcome: Progressing     Problem: Hematologic - Adult  Goal: Maintains hematologic stability  4/28/2022 0223 by Link PLASCENCIA  Outcome: Progressing  Flowsheets (Taken 4/28/2022 0223)  Maintains hematologic stability:   Assess for signs and symptoms of bleeding or hemorrhage   Monitor labs for bleeding or clotting disorders   Administer blood products/factors as ordered  4/28/2022 0214 by Jacqueline PLASCENCIA  Outcome: Progressing   Care plan reviewed with patient. Patient verbalize understanding of the plan of care and contribute to goal setting.

## 2022-04-28 NOTE — PROGRESS NOTES
[facility]         Renal Inpatient Follow-Up note         4/28/2022 8:12 AM    Pt Name:   Andrew Cummins  YOB: 1950  Attending:   Angel Leyva MD    Chief Complaint : Andrew Cummins is a 73 y.o. 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 No distress  Feels ok. No cp or SOB. Some urine output noted. Scheduled Medications :    metoprolol tartrate  25 mg Oral BID    guaiFENesin  600 mg Oral BID    sodium hypochlorite   Irrigation Daily    silver nitrate applicators  1 each Topical Once    miconazole   Topical BID    [Held by provider] aspirin  81 mg Oral Daily    [Held by provider] bumetanide  0.5 mg Oral Daily    famotidine  20 mg PEG Tube Every Other Day    ferrous sulfate  325 mg Oral Every Other Day    FLUoxetine  40 mg Oral Daily    sodium chloride flush  5-40 mL IntraVENous 2 times per day      sodium chloride      dextrose      sodium chloride Stopped (04/16/22 0220)    sodium chloride         Vitals :  /61   Pulse 66   Temp 98.8 °F (37.1 °C) (Oral)   Resp 16   Ht 4' 9\" (1.448 m)   Wt 172 lb 9.9 oz (78.3 kg)   SpO2 100%   BMI 37.35 kg/m²     24HR INTAKE/OUTPUT:      Intake/Output Summary (Last 24 hours) at 4/28/2022 9694  Last data filed at 4/28/2022 0626  Gross per 24 hour   Intake 840 ml   Output 820 ml   Net 20 ml     Last 3 weights  Wt Readings from Last 3 Encounters:   04/28/22 172 lb 9.9 oz (78.3 kg)   03/29/22 134 lb (60.8 kg)   03/13/22 133 lb (60.3 kg)           Physical Exam :  General -- well developed and well nourished  HEENT-normal external appearance of both ears and nose.   Mouth/throat  - oropharynx appears to be clear and moist  Lungs -- clear, diminished  Heart -- S1, S2 heard, JVD - no  Abdomen - soft, non-tender  Extremities -- edema --some dependent edema noted  CNS - awake and alert  Psychiatric-mood and memory appears normal         Last 3 CBC   Recent Labs     04/26/22  0525 04/27/22  0420 04/28/22  0403   WBC 10.4 8.9 8.9   RBC 2.61* 2.51* 2.60*   HGB 7.5* 7.3* 7.6*   HCT 25.8* 25.1* 25.8*    205 230     Last 3 CMP  Recent Labs     04/26/22  0525 04/27/22  0420 04/28/22  0403   * 135 133*   K 5.0 4.3 4.7   CL 97* 100 98   CO2 26 26 23   BUN 82* 41* 55*   CREATININE 4.2* 2.5* 2.9*   CALCIUM 8.4* 8.2* 8.4*             ASSESSMENT / Plan   1. Renal -acute kidney injury on chronic kidney disease stage III exact etiology is not clear however it appears she may be having volume depletion initially/septic ATN  ? Renal ultrasound scan does not show any acute abnormalities. ? Overall renal function continues to worse and volume status was worsening as well  ? So started her on dialysis continue HD for now  ? Awaiting outpatient HD set up most to be discharged today     2. Electrolytes -mild hyponatremia  3. Mild acidosis currently resolved  4. Anemia probably due to bleeding/ renal dysfunction. Stable. Will start PARRIS as an outpatient  5. Hx of chronic diastolic congestive heart failure appears to be well compensated   6. Essential hypertension-stable  7. Meds reviewed and discussed with patient and Dr. Herbert Pert      Dr. Paulie Espinoza MD, M,D.  [facility].

## 2022-04-28 NOTE — PROGRESS NOTES
Report called to [facility] [address] Dr. Alfred laureano talked with nurse York and gave report on patient. Informed Chela that [facility] will be picking up patient from dialysis starting 4/29/22. This nurse gave Chela her call back information for any further updates if needed. No other questions at this time.

## 2022-04-28 NOTE — CARE COORDINATION
4/28/22, 1:57 PM EDT    DISCHARGE PLANNING EVALUATION    SW was advised by JOSE Becerra that [facility] is not able to change pts chair-time at either [facility] or [address] [place] [address] (nothing available). Pt is currently scheduled for MWF @ 2:45 pm at [facility] (run time 3 hrs). DOT spoke with Aki Mohan at [employer], inquired about a one way trip from [facility] to [place] [address] as the facility can take pt to dialysis. Aki Mohan requested SW fax transport request form and they will review their schedule and call SW back. DOT noted that PT states pt is not safe to transport in personal vehicle at this time. 2:47 PM update:  DOT received call from Aki Mohan with [facility] transport. Aki Mohan stated that they are able to transport pt starting tomorrow 4/29/22. DOT updated Ronald Ruiz with [facility] [address]

## 2022-04-28 NOTE — FLOWSHEET NOTE
Initial Spiritual Care Contact:   Kolton Gallagher is in bed on 6k. She is a 73year old female who welcomed prayer and conversation. She is [employer] and has received communion today. Her brother is in the room with her.       04/28/22 1615   Encounter Summary   Encounter Overview/Reason  Initial Encounter   Service Provided For: Patient and family together   Referral/Consult From: [facility] Family members   Last Encounter  04/28/22   Complexity of Encounter Low   Begin Time 1615   End Time  1630   Total Time Calculated 15 min   Spiritual/Emotional needs   Type Spiritual Support   Care Plan:  Continue spiritual and emotional care for patient and family. Including prayers.

## 2022-04-28 NOTE — PLAN OF CARE
Problem: Skin Integrity:  Goal: Will show no infection signs and symptoms  Description: Will show no infection signs and symptoms  4/28/2022 1149 by Paulie Angelo RN  Outcome: Not Progressing     Problem: Skin Integrity:  Goal: Absence of new skin breakdown  Description: Absence of new skin breakdown  4/28/2022 1149 by Paulie Angelo RN  Outcome: Not Progressing     Problem: Discharge Planning:  Goal: Ability to perform activities of daily living will improve  Description: Ability to perform activities of daily living will improve  4/28/2022 1149 by Paulie Angelo RN  Outcome: Not Progressing     Problem: Skin/Tissue Integrity - Adult  Goal: Skin integrity remains intact  4/28/2022 1149 by Paulie Angelo RN  Outcome: Not Progressing     Problem: Falls - Risk of:  Goal: Will remain free from falls  Description: Will remain free from falls  4/28/2022 1149 by Paulie Angelo RN  Outcome: Progressing     Problem: Confusion - Acute:  Goal: Absence of continued neurological deterioration signs and symptoms  Description: Absence of continued neurological deterioration signs and symptoms  4/28/2022 1149 by Paulie Angelo RN  Outcome: Progressing     Problem: Injury - Risk of, Physical Injury:  Goal: Will remain free from falls  Description: Will remain free from falls  4/28/2022 1149 by Paulie Angelo RN  Outcome: Progressing     Problem: Mood - Altered:  Goal: Verbalizations of feeling emotionally comfortable while being cared for will increase  Description: Verbalizations of feeling emotionally comfortable while being cared for will increase  4/28/2022 1149 by Paulie Angelo RN  Outcome: Progressing     Problem: Psychomotor Activity - Altered:  Goal: Absence of psychomotor disturbance signs and symptoms  Description: Absence of psychomotor disturbance signs and symptoms  4/28/2022 1149 by Paulie Angelo RN  Outcome: Progressing     Problem: Sensory Perception - Impaired:  Goal: Demonstrations of improved sensory functioning will increase  Description: Demonstrations of improved sensory functioning will increase  4/28/2022 1149 by Gloria Hightower RN  Outcome: Progressing     Problem: Sleep Pattern Disturbance:  Goal: Appears well-rested  Description: Appears well-rested  4/28/2022 1149 by Gloria Hightower RN  Outcome: Progressing     Problem: Nutrition  Goal: Optimal nutrition therapy  4/28/2022 1149 by Gloria Hightower RN  Outcome: Progressing     Problem: DISCHARGE BARRIERS  Goal: Patient's continuum of care needs are met  4/28/2022 1149 by Gloria Hightower RN  Outcome: Progressing     Problem: Pain:  Goal: Pain level will decrease  Description: Pain level will decrease  4/28/2022 1149 by Gloria Hightower RN  Outcome: Progressing     Problem: Discharge Planning  Goal: Discharge to home or other facility with appropriate resources  4/28/2022 1149 by Gloria Hightower RN  Outcome: Progressing     Problem: Chronic Conditions and Co-morbidities  Goal: Patient's chronic conditions and co-morbidity symptoms are monitored and maintained or improved  4/28/2022 1149 by Gloria Hightower RN  Outcome: Progressing     Problem: ABCDS Injury Assessment  Goal: Absence of physical injury  4/28/2022 1149 by Gloria Hightower RN  Outcome: Progressing     Problem: Nutrition Deficit:  Goal: Optimize nutritional status  4/28/2022 1149 by Gloria Hightower RN  Outcome: Progressing     Problem: Neurosensory - Adult  Goal: Achieves stable or improved neurological status  4/28/2022 1149 by Gloria Hightower RN  Outcome: Progressing     Problem: Musculoskeletal - Adult  Goal: Maintain proper alignment of affected body part  4/28/2022 1149 by Gloria Hightower RN  Outcome: Progressing     Problem: Gastrointestinal - Adult  Goal: Minimal or absence of nausea and vomiting  4/28/2022 1149 by Gloria Hightower RN  Outcome: Progressing     Problem: Genitourinary - Adult  Goal: Absence of urinary retention  4/28/2022 1149 by Gloria Hightower RN  Outcome: Progressing     Problem: Infection - Adult  Goal: Absence of infection at discharge  4/28/2022 1149 by Kirsten Mckeon RN  Outcome: Progressing     Problem: Metabolic/Fluid and Electrolytes - Adult  Goal: Electrolytes maintained within normal limits  4/28/2022 1149 by Kirsten Mckeon RN  Outcome: Progressing     Problem: Hematologic - Adult  Goal: Maintains hematologic stability  4/28/2022 1149 by Kirsten Mckeon RN  Outcome: Progressing

## 2022-04-29 ENCOUNTER — TELEPHONE (OUTPATIENT)
Dept: WOUND CARE | Age: 72
End: 2022-04-29

## 2022-04-29 NOTE — TELEPHONE ENCOUNTER
Cristal from the [facility] called and stated that the patient had been hospitalized for about 3 weeks and while in the hospital, she did not have her wound vac applied. Dressings for the patient has been Dakins wet to dry BID. She wanted to know if the orders are to resume the vac or to continue Dakins. Informed her that per Sudhir RAVI to continue the Dakins until next virtual visit appointment 05/12/2022.  Cristal voiced understanding

## 2022-05-03 ENCOUNTER — TELEPHONE (OUTPATIENT)
Dept: CARDIOLOGY CLINIC | Age: 72
End: 2022-05-03

## 2022-05-03 DIAGNOSIS — I50.32 CHF (CONGESTIVE HEART FAILURE), NYHA CLASS II, CHRONIC, DIASTOLIC (HCC): Primary | ICD-10-CM

## 2022-05-03 NOTE — TELEPHONE ENCOUNTER
Discontinued Bumex at discharge. Please schedule pt for f/u in about 3 weeks. Remind pt of when to call clinic. No f/u labs ordered that I can see?  BMP in two weeks

## 2022-05-03 NOTE — TELEPHONE ENCOUNTER
Patient was d/c to [facility]. Patient is also being managed by nephrology with hemodialysis M-W-F     Do you still want appt and labs as nephrology will draw labs at dialysis?

## 2022-05-09 ENCOUNTER — HOSPITAL ENCOUNTER (OUTPATIENT)
Dept: WOUND CARE | Age: 72
Discharge: HOME | End: 2022-05-09

## 2022-05-12 ENCOUNTER — HOSPITAL ENCOUNTER (OUTPATIENT)
Dept: WOUND CARE | Age: 72
Discharge: HOME | End: 2022-05-12
Payer: MEDICARE

## 2022-05-12 DIAGNOSIS — S91.105D OPEN WOUND OF SECOND TOE OF LEFT FOOT, SUBSEQUENT ENCOUNTER: ICD-10-CM

## 2022-05-12 DIAGNOSIS — L89.154 PRESSURE INJURY OF SACRAL REGION, STAGE 4 (HCC): Primary | ICD-10-CM

## 2022-05-12 DIAGNOSIS — S91.104D OPEN WOUND OF SECOND TOE OF RIGHT FOOT, SUBSEQUENT ENCOUNTER: ICD-10-CM

## 2022-05-12 PROCEDURE — 99212 OFFICE O/P EST SF 10 MIN: CPT | Performed by: NURSE PRACTITIONER

## 2022-05-12 PROCEDURE — 99212 OFFICE O/P EST SF 10 MIN: CPT

## 2022-05-12 NOTE — PLAN OF CARE
Problem: Wound:  Goal: Skin integrity intact  Outcome: Not Progressing  Note: Patient seen virtually today for right and left second toe and sacral wound. Labs and xrays ordered today. Wound vac re ordered for sacrum. Follow up inperson in 3 weeks 5/26/22. See AVS for order changes. Care plan reviewed with patient and caregivers. Patient and caregivers verbalize understanding of the plan of care and contribute to goal setting.

## 2022-05-12 NOTE — PROGRESS NOTES
Harpal Gramajo, was evaluated through a synchronous (real-time) audio-video encounter. The patient (or guardian if applicable) is aware that this is a billable service, which includes applicable co-pays. This Virtual Visit was conducted with patient's (and/or legal guardian's) consent. The visit was conducted pursuant to the emergency declaration under the [address] [place] [address] and the [employer] [employer] Act. Patient identification was verified, and a caregiver was present when appropriate. The patient was located at home in a state where the provider was licensed to provide care. Total time spent for this encounter: 20 minutes    --Remy Casanova RN on 5/12/2022 at 4686    An electronic signature was used to authenticate this note.

## 2022-05-12 NOTE — PROGRESS NOTES
Virtual Visit Wound Care  Clinic Level of Care   NAME:  Pamela King OF BIRTH:  1950 GENDER: female  MEDICAL RECORD NUMBER:  [id number]   DATE:  5/12/2022     Patient Type Points   No documentation completed by nursing staff. []   0   Nursing staff documented in the navigator for an ESTABLISHED patient including Episode, Patient ID, Chief Complaint, Travel Screen, Allergies, Latex Allergy, Home Medication, History, Psychosocial Screen, C-SSRS Screen, Fall Risk, Nutritional Screen, Advanced Directive, Education and Plan of Care, and Discharge Instructions. The Functional Screening tab is only required if the patient's status changes. [x]   1   Nursing staff documented in the navigator for a NEW patient including Patient ID, Chief Complaint, Travel Screen, Allergies, Latex Allergy, Home Medication, History, Psychosocial Screen, C-SSRS Screen, Fall Risk, Nutritional Screen, Advanced Directive, Functional Screen, Education and Plan of Care, and Discharge Instructions. []   2   Nursing staff documented in the navigator for a CONSULT patient including Episode, Patient ID, Chief Complaint, Travel Screen, Allergies, Latex Allergy, Home Medication, History, Psychosocial Screen, C-SSRS Screen, Fall Risk, Nutritional Screen, Advanced Directive, Functional Screen, Education and Plan of Care, and Discharge Instructions. []   2     Wound Description Points   Unable to obtain image of Wound. For example, patient/caregiver is instructed not to remove dressing, is unable to correctly position smart phone, no smart phone is available, patient is unable to maintain connectivity or the patient's wound is healed. []   0   1-3 wound images annotated. Images of the wound(s) is obtained and annotated along with completed description in [address] [place] [address]. [x]   1   4-5 wound images annotated. Images of the wound(s) is obtained and annotated along with completed description in [address] [place] [address]. []   2   Greater than 6 wound images annotated.  Images of the wound(s) is obtained and annotated along with completed description in [address] [place] [address]. []   3     Education Points   No Education completed by nursing staff.    []   0   Patient/caregiver is educated on 1-4 topics. Nursing staff identifies learner, confirms understanding of information (verbal, demonstration, written) and documents details. May include Discharge Instructions/AVS, available documents in My Chart, or Web-based learning.  []   1   Patient/caregiver is educated on 5-9 topics. Nursing staff identifies learner, confirms understanding of information (verbal, demonstration, written) and documents details. May include Discharge Instructions/AVS, available documents in My Chart, or Web-based learning. [x]   2   Patient/caregiver is educated on 10 or more topics. Nursing staff identifies learner, confirms understanding of information (verbal, demonstration, written) and documents details. May include Discharge Instructions/AVS, available documents in My Chart, or Web-based learning. []   3     Follow-up Virtual Visit Points   No contact with outside resources made. []   0   Nursing staff contacts 1-2 outside resource. For example, telephone call made to home health, primary care provider, pharmacy, or DME. May include filling out forms and writing letters, arranging transportation, communication with insurance , vendors, etc.  Discharge, instructions and/or After Visit Summary given to patient/caregiver and instructions completed. [x]   1   Nursing staff contacts 3-4 outside resource. For example, telephone calls made to home health, primary care provider, pharmacy, or DME. May include filling out forms and writing letters, arranging transportation, communication with insurance , vendors, etc.  Discharge, instructions and/or After Visit Summary given to patient/caregiver and instructions completed. []   2   Nursing contacts 5 or more outside resource.  For example, telephone calls made to home health, primary care providers, pharmacy, or DME. May include filling out forms and writing letters, arranging transportation, communication with insurance , vendors, etc.  Discharge, instructions and/or After Visit Summary given to patient/caregiver and instructions completed.    []   3     Is this the Patient's First Visit to the [address] [place] [address] Road  No    Is this Patient Established @ [facility]  Yes             Virtual Visit Clinical Level of Care Wound Care      Points  1-3  Level 1 []     Points  4-5  Level 2 [x]     Points  6-7  Level 3 []     Points  8-9  Level 4 []     Points  10-11  Level 5 []       Electronically signed by Nydia Michel RN on 5/12/2022 at 5874

## 2022-05-12 NOTE — PROGRESS NOTES
Virtual Visit [facility]   Progress Note and Procedure Note    Vishnu Lujan  MEDICAL RECORD NUMBER:  [id number]  AGE: 73 y.o. GENDER: female  : 1950  EPISODE DATE:  2022    Subjective:     Chief Complaint   Patient presents with    Wound Check      Consent obtained to communicate via Virtual Visit:  Yes     HISTORY of PRESENT ILLNESS HPI     Vishnu Lujan is a 73 y.o. female who presents today via Virtual Visit wound/ulcer evaluation. History of Wound Context:   Patient has long standing history of sacral wounds. She had lengthy hospital stay related to respiratory failure, spent some time at [facility].  Sacral wound worsened during stay requiring surgical debridement and diverting colostomy. Peg tube was inserted for nutritional needs. She was last evaluated in clinic 3/28/22. Since that time she ws re-hospitalized due to acute renal failure, has since been discharged back to [facility]. Patient has also developed ulcerations to second toe bilaterally due to hammer toe deformity. Since hospital discharge, facility has been covering with alginate and foam dressings. Facility nurse reports that wounds have appeared to be worsening since she returned to facility. Patient continues on tube feeding via peg tube, managed by facility dietician. No fevers, chills noted. No further needs or concerns identified.     Ulcer Identification:  Ulcer Type: pressure    Contributing Factors: diabetes, chronic pressure, decreased mobility and obesity    Acute Wound: N/A not an acute wound    PAST MEDICAL HISTORY        Diagnosis Date    CHF [facility]congestive heart failure) ([facility])     Dr. Adan Bess Depression     Gout attack     Hemod[facility] patient ([facility]     Hypertension     Osteoarthritis     Pulmonary artery hypertension ([facility]     [facility]-- Dr. Bedelia Dubin-- Dr. Adan Bess Renal calculi     Dr. Liset Carlin Thrombocytopenia [facility])        PAST SURGICAL HISTORY    Past Surgical History:   Procedure Laterality Date    APPENDECTOMY  1960    BRONCHOSCOPY N/A 11/22/2021    BRONCHOSCOPY performed by Deborah Connell MD at [address] N/A 11/30/2021    BRONCHOSCOPY WITHOUT FLURO performed by Deborah Connell MD at [address] N/A 1/27/2022    LAPAROSCOPY WITH DIVERTING LOOP COLOSTOMY performed by Shannon Zelaya MD at [facility] N/A 11/24/2021    EGD PEG TUBE PLACEMENT performed by Reba Washington MD at [facility]    IR [address]  11/26/2021    IR CHEST TUBE INSERTION 11/26/2021 [facility]    IR NEPHROSTOMY PERCUTANEOUS LEFT  11/1/2021    IR NEPHROSTOMY PERCUTANEOUS LEFT 11/1/2021 Rivka Cedeño MD [facility]    IR NEPHROSTOMY PERCUTANEOUS LEFT  3/14/2022    IR NEPHROSTOMY PERCUTANEOUS LEFT 3/14/2022 Bradly Cheek MD [facility]    PRESSURE ULCER DEBRIDEMENT Right 8/6/2021    EXCISION RIGHT BUTTOCK WOUND AND CLOSURE performed by Rebekah Goldberg MD at [address] [facility] [address]    Family History   Problem Relation Age of Onset    Diabetes Father    Jayne Solum Arthritis Mother     COPD Mother     Diabetes Sister     Heart Disease Maternal Uncle     Breast Cancer Niece 36    Sleep Apnea Brother     Asthma Neg Hx     Birth Defects Neg Hx     Cancer Neg Hx     Depression Neg Hx     Early Death Neg Hx     Hearing Loss Neg Hx     High Blood Pressure Neg Hx     High Cholesterol Neg Hx     Kidney Disease Neg Hx     Learning Disabilities Neg Hx     Mental Illness Neg Hx     Mental Retardation Neg Hx     Miscarriages / Stillbirths Neg Hx     Stroke Neg Hx     Substance Abuse Neg Hx     Vision Loss Neg Hx     Other Neg Hx        SOCIAL HISTORY    Social History     Tobacco Use    Smoking status: Never Smoker    Smokeless tobacco: Never Used   Vaping Use    Vaping Use: Never used   Substance Use Topics    Alcohol use: No    Drug use: No       ALLERGIES    No Known Allergies    MEDICATIONS    Current Outpatient Medications on File Prior to Encounter   Medication Sig Dispense Refill    metoprolol tartrate (LOPRESSOR) 25 MG tablet Take 1 tablet by mouth 2 times daily 60 tablet 3    ipratropium-albuterol (DUONEB) 0.5-2.5 (3) MG/3ML SOLN nebulizer solution Inhale 3 mLs into the lungs every 4 hours as needed for Shortness of Breath 360 mL 0    sodium chloride 0.9 % SOLN Inject as directed Flush TID nephrostomy tube 10ml.  miconazole (MICOTIN) 2 % powder Apply topically daily as needed for Itching Apply topically 2 times daily.  silver nitrate applicators 21-85 % applicator Apply 1 each topically Once a week on Friday and PRN to prowed flesh at peg site      folic acid (FOLVITE) 1 MG tablet Take 1 tablet by mouth daily 30 tablet 3    ferrous sulfate (IRON 325) 325 (65 Fe) MG tablet Take 1 tablet by mouth every other day 30 tablet 1    melatonin 3 MG TABS tablet Take 2 tablets by mouth nightly as needed (anxiety, sleep) 30 tablet 1    famotidine (PEPCID) 20 MG tablet 20 mg by PEG Tube route daily      senna (SENOKOT) 8.8 MG/5ML SYRP syrup Take 5 mLs by mouth nightly as needed      sodium hypochlorite (DAKINS) 0.125 % SOLN external solution Apply Dakin's moistened gauze dressings to wound twice daily and as needed. 1 Bottle 2    FLUoxetine (PROZAC) 40 MG capsule Take 1 capsule by mouth daily 90 capsule 3    Multiple Vitamins-Minerals (MULTIVITAMIN WOMEN PO) Take 1 tablet by mouth daily      acetaminophen (TYLENOL) 650 MG extended release tablet Take 650 mg by mouth every 8 hours as needed for Pain      docusate sodium (COLACE) 100 MG capsule Take 100 mg by mouth as needed for Constipation (Daily PRN)        No current facility-administered medications on file prior to encounter. REVIEW OF SYSTEMS    Pertinent items are noted in HPI.     Objective:     PHYSICAL EXAM    General Appearance: in no acute distress and alert    Sacral wound bed granular with tunneling noted to 12 O'clock location. Left second toe wound bed hypergranular with exposed bone reported by facility nurse. Right second toe wound bed hypergranular with exposed bone reported by facility nurse. Assessment:      Problem List Items Addressed This Visit        Other    * (Principal) Pressure injury of sacral region, stage 4 ([facility] - Primary    Open wound of second toe of right foot    Open wound of second toe of left foot          Performed by: LANE Dalal - CNP    Wound/Ulcer #: 1, 2 and 3    Wound 08/24/21 Sacrum (Active)   Wound Image   03/28/22 1319   Wound Etiology Pressure Stage  4 04/25/22 2037   Dressing Status New dressing applied 04/28/22 0830   Wound Cleansed Wound cleanser;Irrigated with saline 04/28/22 0830   Dressing/Treatment ABD;Packing;Tape/Soft cloth adhesive tape 04/28/22 0830   Offloading for Diabetic Foot Ulcers Offloading not required 04/27/22 1957   Dressing Change Due 04/28/22 04/27/22 1957   Wound Length (cm) 4 cm 04/19/22 0506   Wound Width (cm) 7 cm 04/19/22 0506   Wound Depth (cm) 3 cm 04/19/22 0506   Wound Surface Area (cm^2) 28 cm^2 04/19/22 0506   Change in Wound Size % (l*w) -6.67 04/19/22 0506   Wound Volume (cm^3) 84 cm^3 04/19/22 0506   Wound Healing % 16 04/19/22 0506   Distance Tunneling (cm) 4 cm 09/29/21 1322   Tunneling Position ___ O'Clock 12 09/29/21 1322   Undermining Starts ___ O'Clock 12 04/27/22 1957   Undermining Ends___ O'Clock 12 04/27/22 1957   Undermining Maxium Distance (cm) 3 04/27/22 1957   Wound Assessment Erythema;Pink/red; Exposed structure fascia; Hyper granulation tissue 04/28/22 0830   Drainage Amount Small 04/28/22 0830   Drainage Description Serosanguinous 04/28/22 0830   Odor Mild 04/28/22 0830   Katey-wound Assessment Maceration; Non-blanchable erythema;Fragile;Edematous; Excoriated 04/28/22 0830   Margins Unattached edges 04/27/22 1957   Wound Thickness Description not for Pressure Injury Full thickness 04/28/22 0830   Number of days: 260       Wound 03/13/22 Toe (Comment  which one) Anterior; Left left second toe (Active)   Wound Image   03/28/22 1325   Wound Etiology Other 04/28/22 0830   Dressing Status New dressing applied 04/28/22 0830   Wound Cleansed Cleansed with saline 04/28/22 0830   Dressing/Treatment Foam 04/28/22 0830   Offloading for Diabetic Foot Ulcers Offloading ordered 04/26/22 0915   Wound Length (cm) 1.8 cm 03/28/22 1325   Wound Width (cm) 2.5 cm 03/28/22 1325   Wound Depth (cm) 0 cm 03/28/22 1325   Wound Surface Area (cm^2) 4.5 cm^2 03/28/22 1325   Wound Volume (cm^3) 0 cm^3 03/28/22 1325   Wound Assessment Pink/red 04/28/22 0830   Drainage Amount Small 04/28/22 0830   Drainage Description Serosanguinous 04/28/22 0830   Odor None 04/28/22 0830   Katey-wound Assessment Edematous; Excoriated 04/28/22 0830   Margins Attached edges 04/27/22 1957   Number of days: 59       Wound 03/13/22 Toe (Comment  which one) Anterior;Right right second toe (Active)   Wound Image   03/28/22 1326   Wound Etiology Skin Tear 04/28/22 0830   Dressing Status Clean;Dry; Intact 04/28/22 0830   Wound Cleansed Not Cleansed 04/27/22 1957   Dressing/Treatment Open to air 04/28/22 0830   Offloading for Diabetic Foot Ulcers Offloading not ordered 04/27/22 1957   Wound Length (cm) 1 cm 03/28/22 1326   Wound Width (cm) 1 cm 03/28/22 1326   Wound Depth (cm) 0 cm 03/28/22 1326   Wound Surface Area (cm^2) 1 cm^2 03/28/22 1326   Wound Volume (cm^3) 0 cm^3 03/28/22 1326   Wound Assessment Other (Comment) 04/28/22 0830   Drainage Amount None 04/28/22 0830   Drainage Description Serosanguinous 04/27/22 0908   Odor None 04/28/22 0830   Katey-wound Assessment Fragile; Excoriated 04/28/22 0830   Margins Attached edges 04/27/22 1957   Number of days: 59       Wound 04/28/22 Buttocks Left; Inner (Active)   Wound Etiology Skin Tear 04/28/22 0830   Dressing Status New dressing applied 04/28/22 0830   Wound Cleansed Cleansed with saline 04/28/22 0830   Dressing/Treatment Gauze dressing/dressing sponge;Tape/Soft cloth adhesive tape 04/28/22 0830   Wound Assessment Bleeding 04/28/22 0830   Drainage Amount Small 04/28/22 0830   Drainage Description Sanguinous 04/28/22 0830   Katey-wound Assessment Excoriated;Fragile 04/28/22 0830   Number of days: 14          Diabetic/Pressure/Non Pressure Ulcers only:  Ulcer: Pressure ulcer, Stage 4       Plan:     Please see attached Discharge Instructions    Based upon this virtual visit it is required to have an in-person visit of this time.    -Stage IV pressure injury, sacrum- Negative pressure wound therapy was stopped during hospital stay. Will restart now that she has returned to facility as patient notes she tolerates this better. Change three times weekly. Continue offloading techniques. Turn/reposition frequently.    -Open wound to right and left second toe- Wounds appear significantly worsened today with exposed bone reported by facility staff. CBC, ESR, CRP and X-ray of both areas ordered today to evaluate for osteomyelitis. Continue alginate and foam dressings at this time. Avoid use of restrictive footwear that may be contributing to pressure to area. -No indications of infection to wounds noted during today's visit. Patient advised to call clinic or seek emergency care should these occur.     -Follow up 2 weeks in person for re-evaluation. Call clinic with any questions or concerns prior to scheduled visit. Written patient dismissal instructions available to Patient/POA       Discharge Instructions         Discharge Instructions          Visit Discharge/Physician Orders:   - Please send patient with wound vac in place and we will do wound vac dressing change at visit. Please send extra canister and dressing change kit with patient. - Be sure to offload patients heels at all times. - Silver nitrate applied to bilateral second toes.  May have grey drainage for a few days. This is normal.   - Turn and reposition at least every 2 hours. - Limit time up in chair to 1 hour intervals for 3 times daily maximum.   - Low airloss mattress to bed and roho cushion in chair.   - Discontinue creams to groin skin folds and under breasts. Keep areas clean and dry and apply antifungal powder daily as needed.      Home Care: [facility]      Wound Location: Sacrum      Dressing orders:   1) Gather wound care supplies and arrange on clean table. 2) Wash your hands with soap and water or use alcohol based hand  for 20 seconds (sing \"Happy Birthday\" twice). 3) Cleanse wounds with normal saline or wound cleanser and gauze. Pat dry with clean gauze. 4) Sacrum- Apply skin prep to favio wound. Cut foam to fit into wound bed. Apply stoma wafer to favio wound to protect good skin. Apply foam to wound bed followed by clear drape. Cut quarter size hole in center of drape over sponge area and apply vac suction. Run wound vac at 125 mmHg continuous suction. Apply extra clear drape as needed if leaks noted. Use as minimal amount of drape as needed to protect good skin. Change canister as needed when full. Change wound vac three times weekly. 5) bilateral toes- Apply alginate to wounds. Cover with dry gauze and jennifer wrap to hold in place. Change daily.      Keep all dressings clean & dry. Do not shower, take baths or get wound wet, unless otherwise instructed by your Wound Care doctor.      Follow up: 4 weeks                    Iván Deal AGE: 73 y.o. GENDER: female  : 1950 being evaluated by a Virtual Visit (video visit) encounter to address concerns as mentioned above. A caregiver was present when appropriate. Due to this being a TeleHealth encounter (During [facility]-17 public health emergency), evaluation of the following organ systems was limited: Vitals/Constitutional/EENT/Resp/CV/GI//MS/Neuro/Skin/Heme-Lymph-Imm.   Pursuant to the emergency declaration under the [facility], [address] [place] [address] authority and the [employer] Act, this Virtual Visit was conducted with patient's (and/or legal guardian's) consent, to reduce the patient's risk of exposure to COVID-19 and provide necessary medical care. The patient (and/or legal guardian) has also been advised to contact this office for worsening conditions or problems, and seek emergency medical treatment and/or call 911 if deemed necessary. Services were provided through a video synchronous discussion virtually to substitute for in-person clinic visit. Patient was located at their individual homes. Provider was located in [place] [address].     Electronically signed by LANE Jones CNP on 5/12/2022 at 1:08 PM

## 2022-05-17 ENCOUNTER — HOSPITAL ENCOUNTER (OUTPATIENT)
Dept: INTERVENTIONAL RADIOLOGY/VASCULAR | Age: 72
Discharge: HOME | End: 2022-05-17
Payer: MEDICARE

## 2022-05-17 VITALS
RESPIRATION RATE: 18 BRPM | DIASTOLIC BLOOD PRESSURE: 62 MMHG | TEMPERATURE: 98.8 F | SYSTOLIC BLOOD PRESSURE: 137 MMHG | OXYGEN SATURATION: 98 % | HEART RATE: 81 BPM

## 2022-05-17 DIAGNOSIS — N20.0 STAGHORN KIDNEY STONES: Primary | ICD-10-CM

## 2022-05-17 DIAGNOSIS — N20.0 STAGHORN CALCULUS: ICD-10-CM

## 2022-05-17 PROCEDURE — 87075 CULTR BACTERIA EXCEPT BLOOD: CPT

## 2022-05-17 PROCEDURE — 87070 CULTURE OTHR SPECIMN AEROBIC: CPT

## 2022-05-17 PROCEDURE — 2580000003 HC RX 258: Performed by: RADIOLOGY

## 2022-05-17 PROCEDURE — 6360000004 HC RX CONTRAST MEDICATION: Performed by: RADIOLOGY

## 2022-05-17 PROCEDURE — 50435 EXCHANGE NEPHROSTOMY CATH: CPT

## 2022-05-17 PROCEDURE — 2709999900 IR GUIDED NEPHROSTOMY CATH EXCHANGE

## 2022-05-17 PROCEDURE — 6360000002 HC RX W HCPCS: Performed by: RADIOLOGY

## 2022-05-17 RX ORDER — FENTANYL CITRATE 50 UG/ML
50 INJECTION, SOLUTION INTRAMUSCULAR; INTRAVENOUS ONCE
Status: COMPLETED | OUTPATIENT
Start: 2022-05-17 | End: 2022-05-17

## 2022-05-17 RX ORDER — MIDAZOLAM HYDROCHLORIDE 1 MG/ML
1 INJECTION INTRAMUSCULAR; INTRAVENOUS ONCE
Status: COMPLETED | OUTPATIENT
Start: 2022-05-17 | End: 2022-05-17

## 2022-05-17 RX ORDER — SODIUM CHLORIDE 450 MG/100ML
INJECTION, SOLUTION INTRAVENOUS CONTINUOUS
Status: DISCONTINUED | OUTPATIENT
Start: 2022-05-17 | End: 2022-05-18 | Stop reason: HOSPADM

## 2022-05-17 RX ADMIN — MIDAZOLAM 0.5 MG: 1 INJECTION INTRAMUSCULAR; INTRAVENOUS at 11:25

## 2022-05-17 RX ADMIN — IOTHALAMATE MEGLUMINE 20 ML: 600 INJECTION INTRAVASCULAR at 11:54

## 2022-05-17 RX ADMIN — SODIUM CHLORIDE: 4.5 INJECTION, SOLUTION INTRAVENOUS at 09:42

## 2022-05-17 RX ADMIN — FENTANYL CITRATE 25 MCG: 50 INJECTION, SOLUTION INTRAMUSCULAR; INTRAVENOUS at 11:25

## 2022-05-17 ASSESSMENT — PAIN SCALES - GENERAL
PAINLEVEL_OUTOF10: 0

## 2022-05-17 NOTE — H&P
Formulation and discussion of sedation / procedure plans, risks, benefits, side effects and alternatives with patient and/or responsible adult completed.     Electronically signed by Tash Carroll MD on 5/17/22 at 11:22 AM EDT

## 2022-05-17 NOTE — OP NOTE
Department of Radiology  Post Procedure Progress Note      Pre-Procedure Diagnosis:  Staghorn calculus left side    Procedure Performed:  Nephrostomy tube wexchange    Anesthesia: local / versed and fentanyl    Findings: successful, 20 ml thick pinkish/tan colored pus aspirated and sent to lab    Immediate Complications:  None    Estimated Blood Loss: minimal    SEE DICTATED PROCEDURE NOTE FOR COMPLETE DETAILS.     Wai Dale MD   5/17/2022 11:47 AM

## 2022-05-17 NOTE — PROGRESS NOTES
Julieta was wheeled into room via wheelchair with [employer]  transport. Her sister Kyler Berger is here with her. Pt rights and responsibilities offered to pt. Iv started, fluids hung. Procedure explained and Sejal Cleveland and Kyler Berger verbalized questions. Sejal Cleveland has call light within reach and has no other needs at this time. She is awake and talking. She has been NPO for over 4 hours. Urine is brown color. [place] was picked up for procedure. [place] returned from procedure. Nephrostomy tube intact dressing intact. Sejal Hastripp awake and talking. Family not at bedside yet. Sejal Cleveland has no needs and she states no pain, she was repositioned with pillows and says she is comfortable. Call light within reach. [facility] has no needs at this time. Call light within reach. Kyler Berger arrived to room with Sejal Cleveland and dressings clean dry intact. neph tube intact. [facility] has no changes at this time. 1300  Report called to [facility] Abdifatah Bee RN and I gave her report about Sejal Cleveland and questions were answered. Her nephrostomy tube clean dry intact. The urine in the bag is blood tinged from the exchange. Bo Rodriguez was wheeled out via wheelchair with [employer] transport.        _m___ Safety:       (Environmental)   [place] to environment   Ensure ID band is correct and in place/ allergy band as needed   Assess for fall risk   Initiate fall precautions as applicable (fall band, side rails, etc.)   Call light within reach   Bed in low position/ wheels locked    _m___ Pain:        Assess pain level and characteristics   Administer analgesics as ordered   Assess effectiveness of pain management and report to MD as needed    _m___ Knowledge Deficit:   Assess baseline knowledge   Provide teaching at level of understanding   Provide teaching via preferred learning method   Evaluate teaching effectiveness    _m___ Hemodynamic/Respiratory Status:       (Pre and Post Procedure Monitoring)   Assess/Monitor vital signs and LOC   Assess Baseline SpO2 prior to any sedation   Obtain weight/height   Assess vital signs/ LOC until patient meets discharge criteria   Monitor procedure site and notify MD of any issues

## 2022-05-17 NOTE — PROGRESS NOTES
1103 Patient received in IR for left nephrostomy tube exchange. 1107 This procedure has been fully reviewed with the patient and written informed consent has been obtained. 1130 Procedure started with Dr. Mohan Velásquez. 1146 Procedure completed; patient tolerated well. Split guaze and op-site applied to exit site; no bleeding noted. 1151 Patient on cart; comfort ensured. 1156 [email or web address] in [facility].   1202 Patient taken to OPN via cart. [address] RT spoke to Roselia Arias CNP regarding urine being sent to lab for culture and antibiotics may be needed per Dr Mohan Velásquez.

## 2022-05-17 NOTE — H&P
[facility]  Sedation/Analgesia History & Physical    Pt Name: Shadi Mckeon  MRN: [id number]  YOB: 1950  Provider Performing Procedure: Jenny Peterson MD, MD  Primary Care Physician: Caryl Lei MD    PRE-PROCEDURE   DNR-CCA/DNR-CC []Yes [x]No  Brief History/Pre-Procedure Diagnosis: staghorn calculus          MEDICAL HISTORY  []CAD/Valve  []Liver Disease  []Lung Disease []Diabetes  []Hypertension []Renal Disease  []Additional information:       has a past medical history of CHF (congestive heart failure) ([facility] [place] [address] Depression, Gout attack, Hemodialysis patient ([facility] [place] [address] Hypertension, Osteoarthritis, Pulmonary artery hypertension ([facility] [place] [address] Renal calculi, and Thrombocytopenia ([facility] [place] [address] SURGICAL HISTORY   has a past surgical history that includes Appendectomy (1960); Facial nerve surgery; Pressure ulcer debridement (Right, 8/6/2021); IR GUIDED NEPHROSTOMY CATH PLACEMENT LEFT (11/1/2021); bronchoscopy (N/A, 11/22/2021); IR CHEST TUBE INSERTION (11/26/2021); Gastrostomy tube placement (N/A, 11/24/2021); bronchoscopy (N/A, 11/30/2021); colectomy (N/A, 1/27/2022); and IR GUIDED NEPHROSTOMY CATH PLACEMENT LEFT (3/14/2022).   Additional information:       ALLERGIES   Allergies as of 05/17/2022    (No Known Allergies)     Additional information:       MEDICATIONS   Coumadin Use Last 5 Days [x]No []Yes  Antiplatelet drug therapy use last 5 days  [x]No []Yes  Other anticoagulant use last 5 days  [x]No []Yes    Current Outpatient Medications:     metoprolol tartrate (LOPRESSOR) 25 MG tablet, Take 1 tablet by mouth 2 times daily, Disp: 60 tablet, Rfl: 3    ipratropium-albuterol (DUONEB) 0.5-2.5 (3) MG/3ML SOLN nebulizer solution, Inhale 3 mLs into the lungs every 4 hours as needed for Shortness of Breath, Disp: 360 mL, Rfl: 0    sodium chloride 0.9 % SOLN, Inject as directed Flush TID nephrostomy tube 10ml., Disp: , Rfl:     miconazole (MICOTIN) 2 % powder, Apply topically daily as needed for Itching Apply topically 2 times daily. , Disp: , Rfl:     silver nitrate applicators 31-46 % applicator, Apply 1 each topically Once a week on Friday and PRN to prowed flesh at peg site, Disp: , Rfl:     folic acid (FOLVITE) 1 MG tablet, Take 1 tablet by mouth daily, Disp: 30 tablet, Rfl: 3    ferrous sulfate (IRON 325) 325 (65 Fe) MG tablet, Take 1 tablet by mouth every other day, Disp: 30 tablet, Rfl: 1    melatonin 3 MG TABS tablet, Take 2 tablets by mouth nightly as needed (anxiety, sleep), Disp: 30 tablet, Rfl: 1    famotidine (PEPCID) 20 MG tablet, 20 mg by PEG Tube route daily, Disp: , Rfl:     senna (SENOKOT) 8.8 MG/5ML SYRP syrup, Take 5 mLs by mouth nightly as needed, Disp: , Rfl:     sodium hypochlorite (DAKINS) 0.125 % SOLN external solution, Apply Dakin's moistened gauze dressings to wound twice daily and as needed. , Disp: 1 Bottle, Rfl: 2    FLUoxetine (PROZAC) 40 MG capsule, Take 1 capsule by mouth daily, Disp: 90 capsule, Rfl: 3    Multiple Vitamins-Minerals (MULTIVITAMIN WOMEN PO), Take 1 tablet by mouth daily, Disp: , Rfl:     acetaminophen (TYLENOL) 650 MG extended release tablet, Take 650 mg by mouth every 8 hours as needed for Pain, Disp: , Rfl:     docusate sodium (COLACE) 100 MG capsule, Take 100 mg by mouth as needed for Constipation (Daily PRN) , Disp: , Rfl:     Current Facility-Administered Medications:     0.45 % sodium chloride infusion, , IntraVENous, Continuous, Tere Ocasio MD, Last Rate: 20 mL/hr at 05/17/22 0942, New Bag at 05/17/22 0942    fentaNYL (SUBLIMAZE) injection 50 mcg, 50 mcg, IntraVENous, Once, Tere Ocasio MD    midazolam (VERSED) injection 1 mg, 1 mg, IntraVENous, Once, Tere Ocasio MD    iothalamate (CONRAY) 60 % injection 50 mL, 50 mL, IntraCATHeter, ONCE PRN, Tere Ocasio MD  Prior to Admission medications    Medication Sig Start Date End Date Taking?  Authorizing Provider   metoprolol tartrate (LOPRESSOR) 25 MG tablet Take 1 tablet by mouth 2 times daily 4/26/22   Stephen Im, DO   ipratropium-albuterol (DUONEB) 0.5-2.5 (3) MG/3ML SOLN nebulizer solution Inhale 3 mLs into the lungs every 4 hours as needed for Shortness of Breath 4/26/22   Stephen Im, DO   sodium chloride 0.9 % SOLN Inject as directed Flush TID nephrostomy tube 10ml. Historical Provider, MD   miconazole (MICOTIN) 2 % powder Apply topically daily as needed for Itching Apply topically 2 times daily. Historical Provider, MD   silver nitrate applicators 94-08 % applicator Apply 1 each topically Once a week on Friday and PRN to prowed flesh at peg site    Historical Provider, MD   folic acid (FOLVITE) 1 MG tablet Take 1 tablet by mouth daily 2/4/22   Nadeem Steele, DO   ferrous sulfate (IRON 325) 325 (65 Fe) MG tablet Take 1 tablet by mouth every other day 2/4/22   Nadeem Steele, DO   melatonin 3 MG TABS tablet Take 2 tablets by mouth nightly as needed (anxiety, sleep) 2/4/22   Nadeem Steele, DO   famotidine (PEPCID) 20 MG tablet 20 mg by PEG Tube route daily    Historical Provider, MD   senna (SENOKOT) 8.8 MG/5ML SYRP syrup Take 5 mLs by mouth nightly as needed    Historical Provider, MD   sodium hypochlorite (DAKINS) 0.125 % SOLN external solution Apply Dakin's moistened gauze dressings to wound twice daily and as needed.  8/24/21   LANE Phillips - CNP   FLUoxetine (PROZAC) 40 MG capsule Take 1 capsule by mouth daily 7/16/21   Reina Hickman MD   Multiple Vitamins-Minerals (MULTIVITAMIN WOMEN PO) Take 1 tablet by mouth daily    Historical Provider, MD   acetaminophen (TYLENOL) 650 MG extended release tablet Take 650 mg by mouth every 8 hours as needed for Pain    Historical Provider, MD   docusate sodium (COLACE) 100 MG capsule Take 100 mg by mouth as needed for Constipation (Daily PRN)     Historical Provider, MD     Additional information:       VITAL SIGNS   Vitals:    05/17/22 1117   BP: (!) 127/57   Pulse: 86   Resp: 18   Temp:    SpO2: 95% PHYSICAL:   Heart:  [x]Regular rate and rhythm  []Other:    Lungs:  [x]Clear    []Other:    Abdomen: [x]Soft    []Other:    Mental Status: [x]Alert & Oriented  []Other:      PLANNED PROCEDURE   []Biospy []Arteriogram              []Drainage   []Mediport Insertion  []Fistulogram []IV access       []Vertebroplasty / Augmentation  []IVC filter []Dialysis catheter []Biliary drainage  []Other: []CAPD Catheter [x]Nephrostomy Tube / Stent  SEDATION  Planned agent:[x]Midazolam []Meperidine [x]Sublimaze []Dilaudid []Morphine     []Diazepam  []Other:     ASA Classification:  []1 [x]2 []3 []4 []5  Class 1: A normal healthy patient  Class 2: Pt with mild to moderate systemic disease  Class 3: Severe systemic disease or disturbance  Class 4: Severe systemic disorders that are already life threatening. Class 5: Moribund pt with little chances of survival, for more than 24 hours. Mallampati I Airway Classification:   []1 [x]2 []3 []4    [x]Pre-procedure diagnostic studies complete and results available. Comment:    [x]Previous sedation/anesthesia experiences assessed. Comment:    [x]The patient is an appropriate candidate to undergo the planned procedure sedation and anesthesia. (Refer to nursing sedation/analgesia documentation record)  [x]Formulation and discussion of sedation/procedure plan, risks, and expectations with patient and/or responsible adult completed. [x]Patient examined immediately prior to the procedure.  (Refer to nursing sedation/analgesia documentation record)    Gilberto Hale MD, MD  Electronically signed 5/17/2022 at 11:22 AM

## 2022-05-18 ENCOUNTER — TELEPHONE (OUTPATIENT)
Dept: WOUND CARE | Age: 72
End: 2022-05-18

## 2022-05-18 NOTE — TELEPHONE ENCOUNTER
----- Message from Bean Rice sent at 5/18/2022  8:38 AM EDT -----  Ph [phone] JEAN WITH [facility] CALLED TO GET AN APPT SCHEDULED FOR YANDY

## 2022-05-19 ENCOUNTER — TELEPHONE (OUTPATIENT)
Dept: WOUND CARE | Age: 72
End: 2022-05-19

## 2022-05-19 LAB
BASOPHILS ABSOLUTE: ABNORMAL
BASOPHILS RELATIVE PERCENT: ABNORMAL
C-REACTIVE PROTEIN: 189.1
EOSINOPHILS ABSOLUTE: ABNORMAL
EOSINOPHILS RELATIVE PERCENT: ABNORMAL
HCT VFR BLD CALC: 21.7 % (ref 36–46)
HEMOGLOBIN: 7 G/DL (ref 12–16)
LYMPHOCYTES ABSOLUTE: ABNORMAL
LYMPHOCYTES RELATIVE PERCENT: ABNORMAL
MCH RBC QN AUTO: ABNORMAL PG
MCHC RBC AUTO-ENTMCNC: ABNORMAL G/DL
MCV RBC AUTO: ABNORMAL FL
MONOCYTES ABSOLUTE: ABNORMAL
MONOCYTES RELATIVE PERCENT: ABNORMAL
NEUTROPHILS ABSOLUTE: ABNORMAL
NEUTROPHILS RELATIVE PERCENT: ABNORMAL
PLATELET # BLD: 233 K/ΜL
PMV BLD AUTO: ABNORMAL FL
RBC # BLD: 2.35 10^6/ΜL
SEDIMENTATION RATE, ERYTHROCYTE: 34
WBC # BLD: 11.8 10^3/ML

## 2022-05-19 NOTE — TELEPHONE ENCOUNTER
----- Message from Chaparro Vance sent at 5/19/2022  2:09 PM EDT -----  [facility] [phone] STACEY @ [facility] CALLED TO GET YANDY'S 5/26 APPT R/S FOR ANOTHER TIME

## 2022-05-19 NOTE — TELEPHONE ENCOUNTER
Returned call to Alber Montano. No answer, left on hold for 5 minutes. Will try again at a later time.

## 2022-05-20 ENCOUNTER — TELEPHONE (OUTPATIENT)
Dept: WOUND CARE | Age: 72
End: 2022-05-20

## 2022-05-20 NOTE — TELEPHONE ENCOUNTER
Returned call to Suzanne Fragoso, patient rescheduled to 05/03/2022 at 9:00. Transport only able to schedule her on Monday, Wednesday, or Fridays. Suzanne Fragoso agrees to both time and date.

## 2022-05-20 NOTE — TELEPHONE ENCOUNTER
----- Message from Nick Moncada sent at 5/19/2022  2:09 PM EDT -----  [address] [phone] STACEY @ [facility] CALLED TO GET YANDY'S 5/26 APPT R/S FOR ANOTHER TIME

## 2022-05-22 LAB
AEROBIC CULTURE: ABNORMAL
ANAEROBIC CULTURE: ABNORMAL
ORGANISM: ABNORMAL

## 2022-05-23 ENCOUNTER — TELEPHONE (OUTPATIENT)
Dept: NEPHROLOGY | Age: 72
End: 2022-05-23

## 2022-05-23 DIAGNOSIS — N18.6 CKD (CHRONIC KIDNEY DISEASE) STAGE V REQUIRING CHRONIC DIALYSIS (HCC): Primary | ICD-10-CM

## 2022-05-23 DIAGNOSIS — Z99.2 CKD (CHRONIC KIDNEY DISEASE) STAGE V REQUIRING CHRONIC DIALYSIS (HCC): Primary | ICD-10-CM

## 2022-05-23 NOTE — TELEPHONE ENCOUNTER
----- Message from Tamika Purcell DO sent at 5/20/2022  2:27 PM EDT -----  Aranesp 100 mcg SC x 1, H&H, iron sat 2 weeks  ----- Message -----  From: Mars Melissa CMA  Sent: 5/20/2022  12:11 PM EDT  To: Kenrick Hercules MD

## 2022-05-23 NOTE — TELEPHONE ENCOUNTER
Is patient still on dialysis? Orders placed. Will reach out to the [facility] and see when we can get patient scheduled.

## 2022-05-23 NOTE — TELEPHONE ENCOUNTER
Santa Liu did not realize she is on dialysis so if she is then cancel the Aranesp order, she gets it in the HD unit

## 2022-05-24 NOTE — TELEPHONE ENCOUNTER
Dr Patel Ramos, is patient dialysis patient. It shows [facility]. However she is still scheduled for 4 month fu. Please advise.  Thank you

## 2022-05-24 NOTE — TELEPHONE ENCOUNTER
Manuel Armendariz from the [place] [address] is suppose to call today to arrange transportation. If patient is on dialysis then there is no need to arrange for transporation.

## 2022-05-27 NOTE — PROGRESS NOTES
1333 pt placed on trach t-piece 35% sats 92%. Pt tolerated well. Pt was placed back on vent around 1430 for bronch procedure. [Time Spent: ___ minutes] : I have spent [unfilled] minutes of time on the encounter. [>50% of the face to face encounter time was spent on counseling and/or coordination of care for ___] : Greater than 50% of the face to face encounter time was spent on counseling and/or coordination of care for [unfilled]

## 2022-05-31 ENCOUNTER — OFFICE VISIT (OUTPATIENT)
Dept: UROLOGY | Age: 72
End: 2022-05-31
Payer: MEDICARE

## 2022-05-31 VITALS
BODY MASS INDEX: 30.2 KG/M2 | SYSTOLIC BLOOD PRESSURE: 102 MMHG | WEIGHT: 140 LBS | HEIGHT: 57 IN | DIASTOLIC BLOOD PRESSURE: 60 MMHG

## 2022-05-31 DIAGNOSIS — N20.0 KIDNEY STONE: Primary | ICD-10-CM

## 2022-05-31 PROCEDURE — 1090F PRES/ABSN URINE INCON ASSESS: CPT | Performed by: UROLOGY

## 2022-05-31 PROCEDURE — 1124F ACP DISCUSS-NO DSCNMKR DOCD: CPT | Performed by: UROLOGY

## 2022-05-31 PROCEDURE — 99214 OFFICE O/P EST MOD 30 MIN: CPT | Performed by: UROLOGY

## 2022-05-31 PROCEDURE — G8427 DOCREV CUR MEDS BY ELIG CLIN: HCPCS | Performed by: UROLOGY

## 2022-05-31 PROCEDURE — 3017F COLORECTAL CA SCREEN DOC REV: CPT | Performed by: UROLOGY

## 2022-05-31 PROCEDURE — G8400 PT W/DXA NO RESULTS DOC: HCPCS | Performed by: UROLOGY

## 2022-05-31 PROCEDURE — 1036F TOBACCO NON-USER: CPT | Performed by: UROLOGY

## 2022-05-31 PROCEDURE — G8417 CALC BMI ABV UP PARAM F/U: HCPCS | Performed by: UROLOGY

## 2022-05-31 RX ORDER — BUMETANIDE 1 MG/1
2 TABLET ORAL DAILY
Status: ON HOLD | COMMUNITY
End: 2022-07-21 | Stop reason: HOSPADM

## 2022-05-31 NOTE — PROGRESS NOTES
[address] [facility] [address]  Dept: [phone]  Dept Fax: [phone]  Loc: [address] [facility] Urology Office Note -     Patient:  Ga Adams  YOB: 1950    The patient is a 73 y.o. female who presents today for evaluation of the following problems:   Chief Complaint   Patient presents with    Follow-up     kidney stone        History of Present Illness:    Left staghorn calculus  Has neph tube in place-- having some debris and leakage  Is improving clinically  [place] longer in [place]  Here with family  Very rough clinical course over the last few months        Requested/reviewed records from Jose M Li MD office and/or outside [de-identified]    (Patient's old records have been requested, reviewed and pertinent findings summarized in today's note.)    Procedures Today: N/A    Last several PSA's:  No results found for: PSA    Last total testosterone:  No results found for: TESTOSTERONE    Urinalysis today:  No results found for this visit on 05/31/22. Last BUN and creatinine:  Lab Results   Component Value Date    BUN 55 (H) 04/28/2022     Lab Results   Component Value Date    CREATININE 2.9 (H) 04/28/2022       Imaging Reviewed during this Office Visit:   imaging independently reviewed by Juan Jose Marie MD and radiology report verified demonstrating     IR GUIDED NEPHROSTOMY CATH EXCHANGE    Result Date: 5/17/2022  NEPHROSTOMY TUBE EXCHANGE: PERFORMED BY: Audrey Watson M.D. CLINICAL INFORMATION: Large staghorn calculus left kidney. INDWELLING CATHETER: 8 Turkish nephrostomy tube. NEW CATHETER: 8 Turkish nephrostomy tube.   FLUOROSCOPY TIME: 6 minutes 13 seconds FLUOROSCOPIC IMAGES: 5 ESTIMATED BLOOD LOSS: Minimal SEDATION: Versed 0.5 mg; fentanyl 0.5 mcg, IV; the patient was sedated during this procedure,  and monitored with EKG and pulse ox monitoring devices by a registered nurse. Face-to-face time with patient 30 minutes. PROCEDURE:  Signed informed consent was obtained prior to performing this procedure. Exposed portions of the nephrostomy tube and surrounding skin were prepped and draped in a sterile fashion. Iodinated contrast was injected and a nephrostogram was performed, revealing the nephrostomy tube to be in good position and functioning well. Fluoroscopic images were obtained for documentation. The indwelling nephrostomy tube was then removed over a guidewire and a new nephrostomy tube, as listed above, was advanced over the Glidewire with the tip coiled in the renal pelvis. . This was performed with fluoroscopic guidance. Fluoroscopic images were obtained for documentation. A Repeat nephrostogram was performed, confirming the catheter to be in good position and functioning well. A small amount of antibiotic ointment was applied to the  wound site. The catheter was stabilized to the skin with a 1-0 silk suture and a split gauze dressing. .. 1. Status post successful nephrostomy tube exchange. 2. Note that 20 mL of thick pinkish/tan colored pus was aspirated during this procedure and sent to laboratory for culture and sensitivity testing. 3. Patient will be tentatively scheduled for a repeat routine nephrostomy tube exchange in the next 3-4 months. **This report has been created using voice recognition software. It may contain minor errors which are inherent in voice recognition technology. ** Final report electronically signed by Dr. Suzi Morales on 5/17/2022 1:07 PM      PAST MEDICAL, FAMILY AND SOCIAL HISTORY:  Past Medical History:   Diagnosis Date    [facility]     Dr. Velma Beck Depression     Gout attack     [facility]     Hypertension     Osteoarthritis     Pul[facility]     [facility]-- Dr. Haynes [place]-- Dr. Velma Beck Renal calculi     Dr. Dillan Sanchez Thrombocytopenia [facility])      Past Surgical History:   Procedure Laterality Date    APPENDECTOMY  1960    BRONCHOSCOPY N/A 11/22/2021    [facility] [place] [facility]    BRONCHOSCOPY N/A 11/30/2021    BRONCHOSCOPY WITHOUT FLURO [facility] [place] [facility]    COLECTOMY N/A 1/27/2022    LAPAROSCOPY WITH DIVERTING LOOP COLOSTOMY performed by Sarahi Lopez MD at [place] [address] N/A 11/24/2021    EGD PEG TUBE PLACEMENT performed by Melina Soriano MD at [facility] [place] [facility]    IR [address]  11/26/2021    IR CHEST TUBE INSERTION 11/26/2021 [facility]    IR NEPHROSTOMY PERCUTANEOUS LEFT  11/1/2021    IR NEPHROSTOMY PERCUTANEOUS LEFT 11/1/2021 Taisha Owens MD [facility]    IR NEPHROSTOMY PERCUTANEOUS LEFT  3/14/2022    IR NEPHROSTOMY PERCUTANEOUS LEFT 3/14/2022 Avery Stephens MD [facility]    PRESSURE ULCER DEBRIDEMENT Right 8/6/2021    EXCISION RIGHT BUTTOCK WOUND AND CLOSURE performed by Samuel Alarcon MD at [address] [place] [address] History   Problem Relation Age of Onset    Diabetes Father    Davey Harris Arthritis Mother     COPD Mother     Diabetes Sister     Heart Disease Maternal Uncle     Breast Cancer Niece 36    Sleep Apnea Brother     Asthma Neg Hx     Birth Defects Neg Hx     Cancer Neg Hx     Depression Neg Hx     Early Death Neg Hx     Hearing Loss Neg Hx     High Blood Pressure Neg Hx     High Cholesterol Neg Hx     Kidney Disease Neg Hx     Learning Disabilities Neg Hx     Mental Illness Neg Hx     Mental Retardation Neg Hx     Miscarriages / Stillbirths Neg Hx     Stroke Neg Hx     Substance Abuse Neg Hx     Vision Loss Neg Hx     Other Neg Hx      Outpatient Medications Marked as Taking for the 5/31/22 encounter (Office Visit) with Vena Duane, MD   Medication Sig Dispense Refill    sertraline (ZOLOFT) 50 MG tablet Take 50 mg by mouth daily      bumetanide (BUMEX) 1 MG tablet Take 1 mg by mouth daily      metoprolol tartrate (LOPRESSOR) 25 MG tablet Take 1 tablet by mouth 2 times daily 60 tablet 3    ipratropium-albuterol (DUONEB) 0.5-2.5 (3) MG/3ML SOLN nebulizer solution Inhale 3 mLs into the lungs every 4 hours as needed for Shortness of Breath 360 mL 0    sodium chloride 0.9 % SOLN Inject as directed Flush TID nephrostomy tube 10ml.  miconazole (MICOTIN) 2 % powder Apply topically daily as needed for Itching Apply topically 2 times daily.  silver nitrate applicators 33-69 % applicator Apply 1 each topically Once a week on Friday and PRN to prowed flesh at peg site      folic acid (FOLVITE) 1 MG tablet Take 1 tablet by mouth daily 30 tablet 3    ferrous sulfate (IRON 325) 325 (65 Fe) MG tablet Take 1 tablet by mouth every other day 30 tablet 1    melatonin 3 MG TABS tablet Take 2 tablets by mouth nightly as needed (anxiety, sleep) 30 tablet 1    famotidine (PEPCID) 20 MG tablet 20 mg by PEG Tube route daily      senna (SENOKOT) 8.8 MG/5ML SYRP syrup Take 5 mLs by mouth nightly as needed      sodium hypochlorite (DAKINS) 0.125 % SOLN external solution Apply Dakin's moistened gauze dressings to wound twice daily and as needed. 1 Bottle 2    FLUoxetine (PROZAC) 40 MG capsule Take 1 capsule by mouth daily 90 capsule 3    Multiple Vitamins-Minerals (MULTIVITAMIN WOMEN PO) Take 1 tablet by mouth daily      acetaminophen (TYLENOL) 650 MG extended release tablet Take 650 mg by mouth every 8 hours as needed for Pain      docusate sodium (COLACE) 100 MG capsule Take 100 mg by mouth as needed for Constipation (Daily PRN)          Patient has no known allergies.   Social History     Tobacco Use   Smoking Status Never Smoker   Smokeless Tobacco Never Used      (If patient a smoker, smoking cessation counseling offered)   Social History     Substance and Sexual Activity   Alcohol Use No       REVIEW OF SYSTEMS:  Constitutional: negative  Eyes: negative  Respiratory: negative  Cardiovascular: negative  Gastrointestinal: negative  Genitourinary: see HPI  Musculoskeletal: negative  Skin: negative   Neurological: negative  Hematological/Lymphatic: negative  Psychological: negative      Physical Exam:    This a 73 y.o. female  Vitals:    05/31/22 1152   BP: 102/60     Body mass index is 30.3 kg/m². Constitutional: Patient in no acute distress;   neph tube draining    Assessment and Plan        1. Kidney stone               Plan: Will need L PCNL as outpatient but she is not healthy enough at this time. Will preadmit for abx. Will be difficult course for patient  Will need neph tube replaced periodically. Will need preadmit prior to neph tube change to prevent spesis  Will reassess in 2 months. Would like to see albert continue her improvement clinically before we perform surgery. Family and patient are agreeable. Prescriptions Ordered:  No orders of the defined types were placed in this encounter. Orders Placed:  No orders of the defined types were placed in this encounter.            Angelique Lyons MD

## 2022-06-03 ENCOUNTER — TELEPHONE (OUTPATIENT)
Dept: WOUND CARE | Age: 72
End: 2022-06-03

## 2022-06-03 NOTE — TELEPHONE ENCOUNTER
Called carly to reschedule missed appointment. Spoke to patients nurse, she stated that the facility bus is broken down and she doesn't know when it will be fixed. Virtual visit was suggested. She stated that she will have Holley call us back to reschedule when they decide which one to do.

## 2022-06-08 ENCOUNTER — HOSPITAL ENCOUNTER (OUTPATIENT)
Dept: INTERVENTIONAL RADIOLOGY/VASCULAR | Age: 72
Discharge: HOME | End: 2022-06-08
Payer: MEDICARE

## 2022-06-08 ENCOUNTER — TELEPHONE (OUTPATIENT)
Dept: WOUND CARE | Age: 72
End: 2022-06-08

## 2022-06-08 VITALS
OXYGEN SATURATION: 91 % | SYSTOLIC BLOOD PRESSURE: 120 MMHG | HEART RATE: 94 BPM | RESPIRATION RATE: 18 BRPM | DIASTOLIC BLOOD PRESSURE: 57 MMHG | TEMPERATURE: 98.2 F

## 2022-06-08 LAB
ABO CHECK: NORMAL
CREAT SERPL-MCNC: 2.4 MG/DL (ref 0.4–1.2)
ERYTHROCYTE [DISTWIDTH] IN BLOOD BY AUTOMATED COUNT: 15.1 % (ref 11.5–14.5)
ERYTHROCYTE [DISTWIDTH] IN BLOOD BY AUTOMATED COUNT: 54.5 FL (ref 35–45)
GEL INDIRECT COOMBS: NORMAL
GFR SERPL CREATININE-BSD FRML MDRD: 20 ML/MIN/1.73M2
HCT VFR BLD CALC: 23.9 % (ref 37–47)
HEMOGLOBIN: 6.8 GM/DL (ref 12–16)
MCH RBC QN AUTO: 27.8 PG (ref 26–33)
MCHC RBC AUTO-ENTMCNC: 28.5 GM/DL (ref 32.2–35.5)
MCV RBC AUTO: 97.6 FL (ref 81–99)
PATHOLOGIST REVIEW: ABNORMAL
PLATELET # BLD: 377 THOU/MM3 (ref 130–400)
PMV BLD AUTO: 8.4 FL (ref 9.4–12.4)
RBC # BLD: 2.45 MILL/MM3 (ref 4.2–5.4)
RH FACTOR: NORMAL
SCAN OF BLOOD SMEAR: NORMAL
WBC # BLD: 16.6 THOU/MM3 (ref 4.8–10.8)

## 2022-06-08 PROCEDURE — 2709999900 IR GUIDED NEPHROSTOMY CATH EXCHANGE

## 2022-06-08 PROCEDURE — 86900 BLOOD TYPING SEROLOGIC ABO: CPT

## 2022-06-08 PROCEDURE — 36415 COLL VENOUS BLD VENIPUNCTURE: CPT

## 2022-06-08 PROCEDURE — P9016 RBC LEUKOCYTES REDUCED: HCPCS

## 2022-06-08 PROCEDURE — 36430 TRANSFUSION BLD/BLD COMPNT: CPT

## 2022-06-08 PROCEDURE — 86885 COOMBS TEST INDIRECT QUAL: CPT

## 2022-06-08 PROCEDURE — 86901 BLOOD TYPING SEROLOGIC RH(D): CPT

## 2022-06-08 PROCEDURE — 6360000002 HC RX W HCPCS: Performed by: RADIOLOGY

## 2022-06-08 PROCEDURE — 86905 BLOOD TYPING RBC ANTIGENS: CPT

## 2022-06-08 PROCEDURE — 2580000003 HC RX 258: Performed by: RADIOLOGY

## 2022-06-08 PROCEDURE — 50432 PLMT NEPHROSTOMY CATHETER: CPT

## 2022-06-08 PROCEDURE — 82565 ASSAY OF CREATININE: CPT

## 2022-06-08 PROCEDURE — 85027 COMPLETE CBC AUTOMATED: CPT

## 2022-06-08 PROCEDURE — 6360000004 HC RX CONTRAST MEDICATION: Performed by: RADIOLOGY

## 2022-06-08 PROCEDURE — 86922 COMPATIBILITY TEST ANTIGLOB: CPT

## 2022-06-08 RX ORDER — SODIUM CHLORIDE 450 MG/100ML
INJECTION, SOLUTION INTRAVENOUS CONTINUOUS
Status: DISCONTINUED | OUTPATIENT
Start: 2022-06-08 | End: 2022-06-09 | Stop reason: HOSPADM

## 2022-06-08 RX ORDER — SODIUM CHLORIDE 9 MG/ML
INJECTION, SOLUTION INTRAVENOUS PRN
Status: DISCONTINUED | OUTPATIENT
Start: 2022-06-08 | End: 2022-06-09 | Stop reason: HOSPADM

## 2022-06-08 RX ORDER — MIDAZOLAM HYDROCHLORIDE 1 MG/ML
1 INJECTION INTRAMUSCULAR; INTRAVENOUS ONCE
Status: DISCONTINUED | OUTPATIENT
Start: 2022-06-08 | End: 2022-06-09 | Stop reason: HOSPADM

## 2022-06-08 RX ADMIN — SODIUM CHLORIDE: 4.5 INJECTION, SOLUTION INTRAVENOUS at 08:10

## 2022-06-08 RX ADMIN — HYDROMORPHONE HYDROCHLORIDE 0.5 MG: 1 INJECTION, SOLUTION INTRAMUSCULAR; INTRAVENOUS; SUBCUTANEOUS at 10:14

## 2022-06-08 RX ADMIN — CEFAZOLIN 2000 MG: 10 INJECTION, POWDER, FOR SOLUTION INTRAVENOUS at 08:37

## 2022-06-08 RX ADMIN — IOPAMIDOL 10 ML: 612 INJECTION, SOLUTION INTRAVENOUS at 10:54

## 2022-06-08 ASSESSMENT — PAIN SCALES - GENERAL
PAINLEVEL_OUTOF10: 0
PAINLEVEL_OUTOF10: 0

## 2022-06-08 NOTE — TELEPHONE ENCOUNTER
----- Message from Dia Hayden sent at 6/7/2022  9:04 AM EDT -----  [facility] @ [facility] CALLED TO GET YANDY SCHEDULED FOR A VIRTUAL VISIT.  JEAN WILL BE AVAILABLE UNTIL 2 TODAY

## 2022-06-08 NOTE — PROGRESS NOTES
4008 Patient in wheelchair to [facility] for Nephrostomy tube. Patient had a nephrostomy tube on left side. Sister states it was pulled out during dialysis. PT RIGHTS AND RESPONSIBILITIES OFFERED TO PT.    5489 Message sent to Dr. Sandra Jones for HGB 6.8. New orders received for blood transfusion. Dr. Sandra Jones ok with proceeding with nephrostomy tube placement and patient to have blood after. 8938 Type and screen sent to lab.     0945 Patient to IR for procedure. 1111 Patient back to room from IR. Dressing to left flank nephrostomy tube dry and intact. 1121 Blood transfusion started. 1135 15 minute check. Patient doing well. Denies any complaints. Vital signs stable. 1230 Dressing to left flank dry and intact. Patient repositioned in bed.     1355 Blood transfusion complete. Patient tolerated. Vital signs stable. 1420 Assisted patient into wheelchair with 2 assist. Dressing to left flank area dry and intact. Patient denies any complaints. 1430 Report called to nursing home nurse Daniele Becker. Denies any questions.      1450 Patient discharged in wheel chair via transport to [facility].       __M__ Safety:       (Environmental)  Emiliano Lew to environment   Ensure ID band is correct and in place/ allergy band as needed   Assess for fall risk   Initiate fall precautions as applicable (fall band, side rails, etc.)   Call light within reach   Bed in low position/ wheels locked    __M__ Pain:        Assess pain level and characteristics   Administer analgesics as ordered   Assess effectiveness of pain management and report to MD as needed    __M__ Knowledge Deficit:   Assess baseline knowledge   Provide teaching at level of understanding   Provide teaching via preferred learning method   Evaluate teaching effectiveness    __M__ Hemodynamic/Respiratory Status:       (Pre and Post Procedure Monitoring)   Assess/Monitor vital signs and LOC   Assess Baseline SpO2 prior to any sedation   Obtain weight/height   Assess vital signs/ LOC until patient meets discharge criteria   Monitor procedure site and notify MD of any issues

## 2022-06-08 NOTE — TELEPHONE ENCOUNTER
Bandar Dominguez from [place] [address] called wanting to get the patient rescheduled.  Called back and patient rescheduled for 6/30/22 at 930 am.

## 2022-06-08 NOTE — H&P
[facility]  Sedation/Analgesia History & Physical    Pt Name: Tiffani Masters  MRN: [id number]  YOB: 1950  Provider Performing Procedure: Ji Haile MD, MD  Primary Care Physician: Michael Kearney MD    PRE-PROCEDURE   DNR-CCA/DNR-CC []Yes [x]No  Brief History/Pre-Procedure Diagnosis: renal calculi, staghorn          MEDICAL HISTORY  []CAD/Valve  []Liver Disease  []Lung Disease []Diabetes  []Hypertension []Renal Disease  []Additional information:       has a past medical history of CHF (congestive heart failure) ([facility] [address] Depression, Gout attack, Hemodialysis patient ([facility] [address] Hypertension, Osteoarthritis, Pulmonary artery hypertension ([facility] [address] Renal calculi, and Thrombocytopenia ([facility] [address] SURGICAL HISTORY   has a past surgical history that includes Appendectomy (1960); Facial nerve surgery; Pressure ulcer debridement (Right, 8/6/2021); IR GUIDED NEPHROSTOMY CATH PLACEMENT LEFT (11/1/2021); bronchoscopy (N/A, 11/22/2021); IR CHEST TUBE INSERTION (11/26/2021); Gastrostomy tube placement (N/A, 11/24/2021); bronchoscopy (N/A, 11/30/2021); colectomy (N/A, 1/27/2022); and IR GUIDED NEPHROSTOMY CATH PLACEMENT LEFT (3/14/2022).   Additional information:       ALLERGIES   Allergies as of 06/08/2022    (No Known Allergies)     Additional information:       MEDICATIONS   Coumadin Use Last 5 Days [x]No []Yes  Antiplatelet drug therapy use last 5 days  [x]No []Yes  Other anticoagulant use last 5 days  [x]No []Yes    Current Outpatient Medications:     sertraline (ZOLOFT) 50 MG tablet, Take 50 mg by mouth daily, Disp: , Rfl:     bumetanide (BUMEX) 1 MG tablet, Take 1 mg by mouth daily, Disp: , Rfl:     metoprolol tartrate (LOPRESSOR) 25 MG tablet, Take 1 tablet by mouth 2 times daily, Disp: 60 tablet, Rfl: 3    ipratropium-albuterol (DUONEB) 0.5-2.5 (3) MG/3ML SOLN nebulizer solution, Inhale 3 mLs into the lungs every 4 hours as needed for Shortness of Breath, Disp: 360 mL, Rfl: 0    sodium chloride 0.9 % SOLN, Inject as directed Flush TID nephrostomy tube 10ml., Disp: , Rfl:     miconazole (MICOTIN) 2 % powder, Apply topically daily as needed for Itching Apply topically 2 times daily. , Disp: , Rfl:     silver nitrate applicators 99-12 % applicator, Apply 1 each topically Once a week on Friday and PRN to prowed flesh at peg site, Disp: , Rfl:     folic acid (FOLVITE) 1 MG tablet, Take 1 tablet by mouth daily, Disp: 30 tablet, Rfl: 3    ferrous sulfate (IRON 325) 325 (65 Fe) MG tablet, Take 1 tablet by mouth every other day, Disp: 30 tablet, Rfl: 1    melatonin 3 MG TABS tablet, Take 2 tablets by mouth nightly as needed (anxiety, sleep), Disp: 30 tablet, Rfl: 1    famotidine (PEPCID) 20 MG tablet, 20 mg by PEG Tube route daily, Disp: , Rfl:     senna (SENOKOT) 8.8 MG/5ML SYRP syrup, Take 5 mLs by mouth nightly as needed, Disp: , Rfl:     sodium hypochlorite (DAKINS) 0.125 % SOLN external solution, Apply Dakin's moistened gauze dressings to wound twice daily and as needed. , Disp: 1 Bottle, Rfl: 2    FLUoxetine (PROZAC) 40 MG capsule, Take 1 capsule by mouth daily, Disp: 90 capsule, Rfl: 3    Multiple Vitamins-Minerals (MULTIVITAMIN WOMEN PO), Take 1 tablet by mouth daily, Disp: , Rfl:     acetaminophen (TYLENOL) 650 MG extended release tablet, Take 650 mg by mouth every 8 hours as needed for Pain, Disp: , Rfl:     docusate sodium (COLACE) 100 MG capsule, Take 100 mg by mouth as needed for Constipation (Daily PRN) , Disp: , Rfl:     Current Facility-Administered Medications:     0.45 % sodium chloride infusion, , IntraVENous, Continuous, Afshin Pappas MD, Last Rate: 20 mL/hr at 06/08/22 0810, New Bag at 06/08/22 0810    iothalamate (CONRAY) 60 % injection 50 mL, 50 mL, IntraCATHeter, ONCE PRN, Afshin Pappas MD    midazolam (VERSED) injection 1 mg, 1 mg, IntraVENous, Once, Afshin Pappas MD    0.9 % sodium chloride infusion, , IntraVENous, PRN, Kwabena Serrano MD  Prior to Admission medications Medication Sig Start Date End Date Taking? Authorizing Provider   sertraline (ZOLOFT) 50 MG tablet Take 50 mg by mouth daily    Historical Provider, MD   bumetanide (BUMEX) 1 MG tablet Take 1 mg by mouth daily    Historical Provider, MD   metoprolol tartrate (LOPRESSOR) 25 MG tablet Take 1 tablet by mouth 2 times daily 4/26/22   Ileana Levels, DO   ipratropium-albuterol (DUONEB) 0.5-2.5 (3) MG/3ML SOLN nebulizer solution Inhale 3 mLs into the lungs every 4 hours as needed for Shortness of Breath 4/26/22   Ileana Levels, DO   sodium chloride 0.9 % SOLN Inject as directed Flush TID nephrostomy tube 10ml. Historical Provider, MD   miconazole (MICOTIN) 2 % powder Apply topically daily as needed for Itching Apply topically 2 times daily. Historical Provider, MD   silver nitrate applicators 27-50 % applicator Apply 1 each topically Once a week on Friday and PRN to prowed flesh at peg site    Historical Provider, MD   folic acid (FOLVITE) 1 MG tablet Take 1 tablet by mouth daily 2/4/22   Caden Figueroa DO   ferrous sulfate (IRON 325) 325 (65 Fe) MG tablet Take 1 tablet by mouth every other day 2/4/22   Caden Figueroa DO   melatonin 3 MG TABS tablet Take 2 tablets by mouth nightly as needed (anxiety, sleep) 2/4/22   Caden Figueroa DO   famotidine (PEPCID) 20 MG tablet 20 mg by PEG Tube route daily    Historical Provider, MD   senna (SENOKOT) 8.8 MG/5ML SYRP syrup Take 5 mLs by mouth nightly as needed    Historical Provider, MD   sodium hypochlorite (DAKINS) 0.125 % SOLN external solution Apply Dakin's moistened gauze dressings to wound twice daily and as needed.  8/24/21   LANE Daley - CNP   FLUoxetine (PROZAC) 40 MG capsule Take 1 capsule by mouth daily 7/16/21   Radha De León MD   Multiple Vitamins-Minerals (MULTIVITAMIN WOMEN PO) Take 1 tablet by mouth daily    Historical Provider, MD   acetaminophen (TYLENOL) 650 MG extended release tablet Take 650 mg by mouth every 8 hours as needed for Pain Historical Provider, MD   docusate sodium (COLACE) 100 MG capsule Take 100 mg by mouth as needed for Constipation (Daily PRN)     Historical Provider, MD     Additional information:       VITAL SIGNS   Vitals:    06/08/22 1045   BP: (!) 110/50   Pulse: 96   Resp: 22   Temp:    SpO2: 90%       PHYSICAL:   Heart:  [x]Regular rate and rhythm  []Other:    Lungs:  [x]Clear    []Other:    Abdomen: [x]Soft    []Other:    Mental Status: [x]Alert & Oriented  []Other:      PLANNED PROCEDURE   []Biospy []Arteriogram              []Drainage   []Mediport Insertion  []Fistulogram []IV access       []Vertebroplasty / Augmentation  []IVC filter []Dialysis catheter []Biliary drainage  []Other: []CAPD Catheter [x]Nephrostomy Tube / Stent  SEDATION  Planned agent:[x]Midazolam []Meperidine [x]Sublimaze []Dilaudid []Morphine     []Diazepam  []Other:     ASA Classification:  []1 [x]2 []3 []4 []5  Class 1: A normal healthy patient  Class 2: Pt with mild to moderate systemic disease  Class 3: Severe systemic disease or disturbance  Class 4: Severe systemic disorders that are already life threatening. Class 5: Moribund pt with little chances of survival, for more than 24 hours. Mallampati I Airway Classification:   []1 [x]2 []3 []4    [x]Pre-procedure diagnostic studies complete and results available. Comment:    [x]Previous sedation/anesthesia experiences assessed. Comment:    [x]The patient is an appropriate candidate to undergo the planned procedure sedation and anesthesia. (Refer to nursing sedation/analgesia documentation record)  [x]Formulation and discussion of sedation/procedure plan, risks, and expectations with patient and/or responsible adult completed. [x]Patient examined immediately prior to the procedure.  (Refer to nursing sedation/analgesia documentation record)    Екатерина Zendejas MD, MD  Electronically signed 6/8/2022 at 10:47 AM

## 2022-06-17 ENCOUNTER — TELEPHONE (OUTPATIENT)
Dept: UROLOGY | Age: 72
End: 2022-06-17

## 2022-06-24 ENCOUNTER — APPOINTMENT (OUTPATIENT)
Dept: GENERAL RADIOLOGY | Age: 72
End: 2022-06-24
Payer: MEDICARE

## 2022-06-24 ENCOUNTER — HOSPITAL ENCOUNTER (EMERGENCY)
Age: 72
Discharge: HOME | End: 2022-06-24
Attending: EMERGENCY MEDICINE
Payer: MEDICARE

## 2022-06-24 VITALS
RESPIRATION RATE: 16 BRPM | OXYGEN SATURATION: 94 % | HEIGHT: 57 IN | TEMPERATURE: 98.3 F | WEIGHT: 140 LBS | BODY MASS INDEX: 30.2 KG/M2 | HEART RATE: 85 BPM | DIASTOLIC BLOOD PRESSURE: 45 MMHG | SYSTOLIC BLOOD PRESSURE: 99 MMHG

## 2022-06-24 DIAGNOSIS — K94.23 MALFUNCTION OF PERCUTANEOUS ENDOSCOPIC GASTROSTOMY (PEG) TUBE (HCC): Primary | ICD-10-CM

## 2022-06-24 PROCEDURE — 99283 EMERGENCY DEPT VISIT LOW MDM: CPT

## 2022-06-24 PROCEDURE — 6360000004 HC RX CONTRAST MEDICATION: Performed by: EMERGENCY MEDICINE

## 2022-06-24 PROCEDURE — 49465 FLUORO EXAM OF G/COLON TUBE: CPT

## 2022-06-24 PROCEDURE — 43762 RPLC GTUBE NO REVJ TRC: CPT

## 2022-06-24 RX ADMIN — DIATRIZOATE MEGLUMINE AND DIATRIZOATE SODIUM 30 ML: 600; 100 SOLUTION ORAL; RECTAL at 05:50

## 2022-06-24 RX ADMIN — DIATRIZOATE MEGLUMINE AND DIATRIZOATE SODIUM 30 ML: 600; 100 SOLUTION ORAL; RECTAL at 06:01

## 2022-06-24 ASSESSMENT — PAIN SCALES - GENERAL
PAINLEVEL_OUTOF10: 5
PAINLEVEL_OUTOF10: 9

## 2022-06-24 ASSESSMENT — PAIN DESCRIPTION - LOCATION: LOCATION: BUTTOCKS

## 2022-06-24 ASSESSMENT — PAIN - FUNCTIONAL ASSESSMENT: PAIN_FUNCTIONAL_ASSESSMENT: 0-10

## 2022-06-24 NOTE — ED TRIAGE NOTES
Pt arrives via EMS from [facility] [place] [address]. Per EMS, pt was found in bed this morning with PEG tube out. Pt uses 20F tube per nursing home. Pt has seaman catheter, colostomy bag, and wound vac in place upon arrival. Pt is A&Ox4.

## 2022-06-24 NOTE — ED NOTES
Paperwork faxed to [facility] for transport back to [place].      Ivonne Mckee RN  06/24/22 1546

## 2022-06-24 NOTE — ED NOTES
Pt in bed sleeping at this time. Updated on plan of care. Call light in reach.      Adeline Boyce RN  06/24/22 8247

## 2022-06-24 NOTE — ED NOTES
PEG tube replaced by Dr. Ale Collins at this time. Pt tolerated well.      Manoj Resendiz RN  06/24/22 3589

## 2022-06-24 NOTE — ED NOTES
Report called to Cristal at [facility] at this time.      Belinda Werner RN  06/24/22 0815

## 2022-06-24 NOTE — ED PROVIDER NOTES
[facility]  EMERGENCY DEPARTMENT ENCOUNTER      PATIENT NAME: Alba Delong  MRN: [id number]  : 1950  TIDWELL: 2022  PROVIDER: Jose M Her MD      CHIEF COMPLAINT       Chief Complaint   Patient presents with    Other     PEG tube removed       Patient is seen and evaluated in a timely fashion. Nurses Notes are reviewed and I agree except as noted in the HPI. HISTORY OF PRESENT ILLNESS    Alba Delong is a 73 y.o. female who presents to Emergency Department with Other (PEG tube removed)     This patient 68-year-old female with PMH of chronic diastolic heart failure, obstructive staghorn calculus of the left kidney not a candidate for stone treatment which nephrostomy tube was placed, stage III decubitus ulcer, CKD stage III and general anxiety disorder. She has an PEG tube which was inadvertently was pulled out this morning. Pain is mild from stoma, no active bleeding. Patient had a 20 [place] PEG tube with balloon. This HPI was provided by patient and EMS. REVIEW OF SYSTEMS   Ten-point review of systems is negative except those documented in above HPI including constitutional, HEENT, respiratory, cardiovascular, gastrointestinal, genitourinary, musculoskeletal, skin, neurological, hematological and behavioral.      PAST MEDICAL HISTORY    has a past medical history of CHF (congestive heart failure) ([address] Depression, Gout attack, Hemodialysis patient ([address] Hypertension, Osteoarthritis, Pulmonary artery hypertension ([address] Renal calculi, and Thrombocytopenia ([address] SURGICAL HISTORY      has a past surgical history that includes Appendectomy (); Facial nerve surgery; Pressure ulcer debridement (Right, 2021); IR GUIDED NEPHROSTOMY CATH PLACEMENT LEFT (2021); bronchoscopy (N/A, 2021); IR CHEST TUBE INSERTION (2021);  Gastrostomy tube placement (N/A, 2021); bronchoscopy (N/A, 2021); colectomy (N/A, 2022); and IR GUIDED NEPHROSTOMY CATH PLACEMENT LEFT (3/14/2022). CURRENT MEDICATIONS       Previous Medications    ACETAMINOPHEN (TYLENOL) 650 MG EXTENDED RELEASE TABLET    Take 650 mg by mouth every 8 hours as needed for Pain    BUMETANIDE (BUMEX) 1 MG TABLET    Take 1 mg by mouth daily    DOCUSATE SODIUM (COLACE) 100 MG CAPSULE    Take 100 mg by mouth as needed for Constipation (Daily PRN)     FAMOTIDINE (PEPCID) 20 MG TABLET    20 mg by PEG Tube route daily    FERROUS SULFATE (IRON 325) 325 (65 FE) MG TABLET    Take 1 tablet by mouth every other day    FLUOXETINE (PROZAC) 40 MG CAPSULE    Take 1 capsule by mouth daily    FOLIC ACID (FOLVITE) 1 MG TABLET    Take 1 tablet by mouth daily    IPRATROPIUM-ALBUTEROL (DUONEB) 0.5-2.5 (3) MG/3ML SOLN NEBULIZER SOLUTION    Inhale 3 mLs into the lungs every 4 hours as needed for Shortness of Breath    MELATONIN 3 MG TABS TABLET    Take 2 tablets by mouth nightly as needed (anxiety, sleep)    METOPROLOL TARTRATE (LOPRESSOR) 25 MG TABLET    Take 1 tablet by mouth 2 times daily    MICONAZOLE (MICOTIN) 2 % POWDER    Apply topically daily as needed for Itching Apply topically 2 times daily. MULTIPLE VITAMINS-MINERALS (MULTIVITAMIN WOMEN PO)    Take 1 tablet by mouth daily    SENNA (SENOKOT) 8.8 MG/5ML SYRP SYRUP    Take 5 mLs by mouth nightly as needed    SERTRALINE (ZOLOFT) 50 MG TABLET    Take 50 mg by mouth daily    SILVER NITRATE APPLICATORS 36-10 % APPLICATOR    Apply 1 each topically Once a week on Friday and PRN to prowed flesh at peg site    SODIUM CHLORIDE 0.9 % SOLN    Inject as directed Flush TID nephrostomy tube 10ml. SODIUM HYPOCHLORITE (DAKINS) 0.125 % SOLN EXTERNAL SOLUTION    Apply Dakin's moistened gauze dressings to wound twice daily and as needed. ALLERGIES     has No Known Allergies. FAMILY HISTORY     She indicated that her mother is . She indicated that her father is . She indicated that the status of her sister is unknown.  She indicated that the status of her brother is unknown. She indicated that the status of her maternal uncle is unknown. She indicated that the status of her neg hx is unknown. She indicated that her niece is alive. family history includes Arthritis in her mother; Breast Cancer (age of onset: 36) in her niece; COPD in her mother; Diabetes in her father and sister; Heart Disease in her maternal uncle; Sleep Apnea in her brother. SOCIAL HISTORY      reports that she has never smoked. She has never used smokeless tobacco. She reports that she does not drink alcohol and does not use drugs. PHYSICAL EXAM      height is 4' 9\" (1.448 m) and weight is 140 lb (63.5 kg). Her oral temperature is 98.3 °F (36.8 °C). Her blood pressure is 99/45 (abnormal) and her pulse is 85. Her respiration is 16 and oxygen saturation is 94%. Physical Exam  Vitals and nursing note reviewed. HENT:      Head: Normocephalic and atraumatic. Nose: Nose normal.   Eyes:      Extraocular Movements: Extraocular movements intact. Pupils: Pupils are equal, round, and reactive to light. Cardiovascular:      Rate and Rhythm: Tachycardia present. Comments: Patient states tachycardia is her baseline, she normally has heart rate around 100  Pulmonary:      Effort: Pulmonary effort is normal.      Breath sounds: Normal breath sounds. Abdominal:      General: Abdomen is flat. Comments: Colostomy bag in place. Gastrostomy stoma has no active bleeding. Musculoskeletal:         General: No deformity or signs of injury. Cervical back: Normal range of motion. Skin:     General: Skin is warm. Capillary Refill: Capillary refill takes less than 2 seconds. Neurological:      General: No focal deficit present. Mental Status: She is alert. DIFFERENTIAL DIAGNOSIS   Malfunction of gastrostomy tube    ANCILLARY TEST RESULTS   EKG: Interpreted by me  Not indicated    LAB RESULTS:  No results found for this visit on 06/24/22.     RADIOLOGY REPORTS  XR INJ CONTRAST GASTRIC TUBE PERC   Final Result   G-tube tip within the stomach. **This report has been created using voice recognition software. It may contain minor errors which are inherent in voice recognition technology. **      Final report electronically signed by Dr. Koko Zuleta on 6/24/2022 7:17 AM          MEDICAL [address] ([facility])     Patient is seen and evaluated at  5:25 AM EDT. Patient is not sure when gastrotomy tube fell out, but she thinks it was within the last 3 hours. Therefore I try to reinsert a 21 [place] gastrostomy tube with balloon and it is successful. Post G-tube insertion x-ray reveals G-tube tube within the stomach. Patient was transferred back to SNF in stable situations. Medications   diatrizoate meglumine-sodium (GASTROGRAFIN) 66-10 % solution 30 mL (30 mLs PEG Tube Given 6/24/22 0601)       Vitals:    06/24/22 0504 06/24/22 0608   BP: (!) 107/50 (!) 99/45   Pulse: (!) 102 85   Resp: 18 16   Temp: 98.3 °F (36.8 °C)    TempSrc: Oral    SpO2: 98% 94%   Weight: 140 lb (63.5 kg)    Height: 4' 9\" (1.448 m)        CRITICAL CARE   None    CONSULTS   None    PROCEDURES   None    FINAL IMPRESSION AND DISPOSITION      1.  Malfunction of percutaneous endoscopic gastrostomy (PEG) tube [facility])        DISPOSITION Discharge - Pending Orders Complete 06/24/2022 06:55:32 AM        PATIENT REFERRED TO:  Jazz Gudino, [facility]  [phone]    In 3 days  ED discharge follow up    [address]:  New Prescriptions    No medications on file     (Please note that portions of this note were completed with a voice recognition program.  Efforts were made to edit the dictations but occasionally words aremis-transcribed.)  MD Kala Rodriguez MD  06/24/22 0637

## 2022-06-30 ENCOUNTER — HOSPITAL ENCOUNTER (OUTPATIENT)
Dept: WOUND CARE | Age: 72
Discharge: HOME | End: 2022-06-30
Payer: MEDICARE

## 2022-06-30 DIAGNOSIS — S91.105D OPEN WOUND OF SECOND TOE OF LEFT FOOT, SUBSEQUENT ENCOUNTER: Primary | ICD-10-CM

## 2022-06-30 DIAGNOSIS — L89.154 PRESSURE INJURY OF SACRAL REGION, STAGE 4 (HCC): ICD-10-CM

## 2022-06-30 DIAGNOSIS — S91.104D OPEN WOUND OF SECOND TOE OF RIGHT FOOT, SUBSEQUENT ENCOUNTER: ICD-10-CM

## 2022-06-30 PROCEDURE — 99213 OFFICE O/P EST LOW 20 MIN: CPT | Performed by: NURSE PRACTITIONER

## 2022-06-30 PROCEDURE — 99212 OFFICE O/P EST SF 10 MIN: CPT

## 2022-06-30 NOTE — PROGRESS NOTES
Maral Arcenioguanaco, was evaluated through a synchronous (real-time) audio-video encounter. The patient (or guardian if applicable) is aware that this is a billable service, which includes applicable co-pays. This Virtual Visit was conducted with patient's (and/or legal guardian's) consent. The visit was conducted pursuant to the emergency declaration under the [address] [place] [address] and the [employer] General Act. Patient identification was verified, and a caregiver was present when appropriate. The patient was located at Home: [address] [address]. Provider was located at [facility] ([place] [address]): [address] [facility],  [facility]. Total time spent for this encounter: 15 minutes    --Robinson Caballero RN on 6/30/2022 at 10:26 AM    An electronic signature was used to authenticate this note.

## 2022-06-30 NOTE — PROGRESS NOTES
Virtual Visit Wound Care  Clinic Level of Care   NAME:  Shirley Seat OF BIRTH:  1950 GENDER: female  MEDICAL RECORD NUMBER:  [id number]   DATE:  6/30/2022     Patient Type Points   No documentation completed by nursing staff. []   0   Nursing staff documented in the navigator for an ESTABLISHED patient including Episode, Patient ID, Chief Complaint, Travel Screen, Allergies, Latex Allergy, Home Medication, History, Psychosocial Screen, C-SSRS Screen, Fall Risk, Nutritional Screen, Advanced Directive, Education and Plan of Care, and Discharge Instructions. The Functional Screening tab is only required if the patient's status changes. [x]   1   Nursing staff documented in the navigator for a NEW patient including Patient ID, Chief Complaint, Travel Screen, Allergies, Latex Allergy, Home Medication, History, Psychosocial Screen, C-SSRS Screen, Fall Risk, Nutritional Screen, Advanced Directive, Functional Screen, Education and Plan of Care, and Discharge Instructions. []   2   Nursing staff documented in the navigator for a CONSULT patient including Episode, Patient ID, Chief Complaint, Travel Screen, Allergies, Latex Allergy, Home Medication, History, Psychosocial Screen, C-SSRS Screen, Fall Risk, Nutritional Screen, Advanced Directive, Functional Screen, Education and Plan of Care, and Discharge Instructions. []   2     Wound Description Points   Unable to obtain image of Wound. For example, patient/caregiver is instructed not to remove dressing, is unable to correctly position smart phone, no smart phone is available, patient is unable to maintain connectivity or the patient's wound is healed. []   0   1-3 wound images annotated. Images of the wound(s) is obtained and annotated along with completed description in [address] [place] [address]. [x]   1   4-5 wound images annotated. Images of the wound(s) is obtained and annotated along with completed description in [address] [place] [address]. []   2   Greater than 6 wound images annotated.  Images of the wound(s) is obtained and annotated along with completed description in [address] [place] [address]. []   3     Education Points   No Education completed by nursing staff.    []   0   Patient/caregiver is educated on 1-4 topics. Nursing staff identifies learner, confirms understanding of information (verbal, demonstration, written) and documents details. May include Discharge Instructions/AVS, available documents in My Chart, or Web-based learning. [x]   1   Patient/caregiver is educated on 5-9 topics. Nursing staff identifies learner, confirms understanding of information (verbal, demonstration, written) and documents details. May include Discharge Instructions/AVS, available documents in My Chart, or Web-based learning. []   2   Patient/caregiver is educated on 10 or more topics. Nursing staff identifies learner, confirms understanding of information (verbal, demonstration, written) and documents details. May include Discharge Instructions/AVS, available documents in My Chart, or Web-based learning. []   3     Follow-up Virtual Visit Points   No contact with outside resources made. []   0   Nursing staff contacts 1-2 outside resource. For example, telephone call made to home health, primary care provider, pharmacy, or DME. May include filling out forms and writing letters, arranging transportation, communication with insurance , vendors, etc.  Discharge, instructions and/or After Visit Summary given to patient/caregiver and instructions completed. [x]   1   Nursing staff contacts 3-4 outside resource. For example, telephone calls made to home health, primary care provider, pharmacy, or DME. May include filling out forms and writing letters, arranging transportation, communication with insurance , vendors, etc.  Discharge, instructions and/or After Visit Summary given to patient/caregiver and instructions completed. []   2   Nursing contacts 5 or more outside resource.  For example, telephone calls made to home health, primary care providers, pharmacy, or DME. May include filling out forms and writing letters, arranging transportation, communication with insurance , vendors, etc.  Discharge, instructions and/or After Visit Summary given to patient/caregiver and instructions completed.    []   3     Is this the Patient's First Visit to the [address] [place] [address] Road  No    Is this Patient Established @ [facility]  Yes             Virtual Visit Clinical Level of Care Wound Care      Points  1-3  Level 1 []     Points  4-5  Level 2 [x]     Points  6-7  Level 3 []     Points  8-9  Level 4 []     Points  10-11  Level 5 []       Electronically signed by Alfreda Thayer RN on 6/30/2022 at 10:28 AM

## 2022-06-30 NOTE — PLAN OF CARE
Problem: Wound:  Goal: Skin integrity intact  Outcome: Progressing   Pt. Seen today for virtual visit see AVS for new orders. Follow up in 2 weeks. Care plan reviewed with patient and nurse. Patient and nurse verbalize understanding of the plan of care and contribute to goal setting.

## 2022-06-30 NOTE — PROGRESS NOTES
Virtual Visit [employer]   Progress Note and Procedure Note    Francine Loza  MEDICAL RECORD NUMBER:  [id number]  AGE: 73 y.o. GENDER: female  : 1950  EPISODE DATE:  2022    Subjective:     Chief Complaint   Patient presents with    Wound Check     SACRUM, RIGHT AND LEFT SECOND TOES      Consent obtained to communicate via Virtual Visit:  Yes     HISTORY of PRESENT ILLNESS HPI     Francine Loza is a 73 y.o. female who presents today via Virtual Visit wound/ulcer evaluation. History of Wound Context:   Patient has long standing history of sacral wounds. She had lengthy hospital stay related to respiratory failure, spent some time at [facility].  Since that time she has been re-admitted on multiple occasions. Sacral wound worsened during stay in April requiring surgical debridement and diverting colostomy. Current wound care includes negative pressure wound therapy. Nurse notes worsening of wound since last evaluation-this began after patient started hemodialysis where she is required to sit upright in chair for 5 hours twice weekly. Patient has also developed ulcerations to 2nd, 3rd, 4th toes to the right foot and second toe to left foot due to hammer toe deformity. Currently have been applying alginate to wound-nurse notes bleeding with removal. Nurse has stopped use of shoes to patient as she is now utilizing nick for transfers. Peg tube is in for nutritional needs. Patient receives tube feeding at  with 3 protein drinks daily, managed by facility dietician. No fevers, chills noted. No further needs or concerns identified.     Ulcer Identification:  Ulcer Type: pressure    Contributing Factors: diabetes, chronic pressure, decreased mobility and obesity    Acute Wound: N/A not an acute wound    PAST MEDICAL HISTORY        Diagnosis Date    CHF (congestive heart failure) ([facility])     Dr. Nallely Tucker Depression     Gout attack     Hemod[facility] patient ([facility]     Hypertension     Osteoarthritis     Pulmonary artery hypertension ([facility]     [facility]-- Dr. Luis E Bellamy-- Dr. Cory Ford Renal calculi     Dr. Chelsie Cuba Thrombocytopenia [facility])        PAST SURGICAL HISTORY    Past Surgical History:   Procedure Laterality Date    APPENDECTOMY  1960    BRONCHOSCOPY N/A 11/22/2021    BRONCHOSCOPY performed by Dolly Louis MD at [facility] N/A 11/30/2021    BRONCHOSCOPY WITHOUT FLURO performed by Dolly Louis MD at [facility]    COLECTOMY N/A 1/27/2022    LAPAROSCOPY WITH DIVERTING LOOP COLOSTOMY performed by Edyta Pina MD at [place] [address] N/A 11/24/2021    EGD PEG TUBE PLACEMENT performed by Daphney Sanon MD at [facility]    IR [address]  11/26/2021    IR CHEST TUBE INSERTION 11/26/2021 [facility]    IR NEPHROSTOMY PERCUTANEOUS LEFT  11/1/2021    IR NEPHROSTOMY PERCUTANEOUS LEFT 11/1/2021 Jyothi Albright MD [facility]    IR NEPHROSTOMY PERCUTANEOUS LEFT  3/14/2022    IR NEPHROSTOMY PERCUTANEOUS LEFT 3/14/2022 Kathrin Davis MD [facility]    PRESSURE ULCER DEBRIDEMENT Right 8/6/2021    EXCISION RIGHT BUTTOCK WOUND AND CLOSURE performed by Ewa Maya MD at [address] [place] [address] History   Problem Relation Age of Onset    Diabetes Father    [facility] Arth[facility] Mother     COPD Mother     Diabetes Sister     Heart Disease Maternal Uncle     Breast Cancer Niece 36    Sleep Apnea Brother     Asthma Neg Hx     Birth Defects Neg Hx     Cancer Neg Hx     Depression Neg Hx     Early Death Neg Hx     Hearing Loss Neg Hx     High Blood Pressure Neg Hx     High Cholesterol Neg Hx     Kidney Disease Neg Hx     Learning Disabilities Neg Hx     Mental Illness Neg Hx     Mental Retardation Neg Hx     Miscarriages / Stillbirths Neg Hx     Stroke Neg Hx     Substance Abuse Neg Hx     Vision Loss Neg Hx     Other Neg Hx        SOCIAL HISTORY    Social History     Tobacco Use    Smoking status: Never Smoker    Smokeless tobacco: Never Used   Vaping Use    Vaping Use: Never used   Substance Use Topics    Alcohol use: No    Drug use: No       ALLERGIES    No Known Allergies    MEDICATIONS    Current Outpatient Medications on File Prior to Encounter   Medication Sig Dispense Refill    sertraline (ZOLOFT) 50 MG tablet Take 50 mg by mouth daily      bumetanide (BUMEX) 1 MG tablet Take 1 mg by mouth daily      metoprolol tartrate (LOPRESSOR) 25 MG tablet Take 1 tablet by mouth 2 times daily 60 tablet 3    ipratropium-albuterol (DUONEB) 0.5-2.5 (3) MG/3ML SOLN nebulizer solution Inhale 3 mLs into the lungs every 4 hours as needed for Shortness of Breath 360 mL 0    sodium chloride 0.9 % SOLN Inject as directed Flush TID nephrostomy tube 10ml.  miconazole (MICOTIN) 2 % powder Apply topically daily as needed for Itching Apply topically 2 times daily.  silver nitrate applicators 83-63 % applicator Apply 1 each topically Once a week on Friday and PRN to prowed flesh at peg site      folic acid (FOLVITE) 1 MG tablet Take 1 tablet by mouth daily 30 tablet 3    ferrous sulfate (IRON 325) 325 (65 Fe) MG tablet Take 1 tablet by mouth every other day 30 tablet 1    melatonin 3 MG TABS tablet Take 2 tablets by mouth nightly as needed (anxiety, sleep) 30 tablet 1    famotidine (PEPCID) 20 MG tablet 20 mg by PEG Tube route daily      senna (SENOKOT) 8.8 MG/5ML SYRP syrup Take 5 mLs by mouth nightly as needed      sodium hypochlorite (DAKINS) 0.125 % SOLN external solution Apply Dakin's moistened gauze dressings to wound twice daily and as needed.  1 Bottle 2    FLUoxetine (PROZAC) 40 MG capsule Take 1 capsule by mouth daily 90 capsule 3    Multiple Vitamins-Minerals (MULTIVITAMIN WOMEN PO) Take 1 tablet by mouth daily      acetaminophen (TYLENOL) 650 MG extended release tablet Take 650 mg by mouth every 8 hours as needed for Pain      docusate sodium (COLACE) 100 MG capsule Take 100 mg by mouth as needed for Constipation (Daily PRN)        No current facility-administered medications on file prior to encounter. REVIEW OF SYSTEMS    Pertinent items are noted in HPI. Objective:     PHYSICAL EXAM    General Appearance: in no acute distress and alert    Contractures noted to bilateral feet. Peg tube insertion site erythematous, denuded. Sacral wound bed granular with slough and devitalized tissue- tunneling noted. Left second toe wound bed shows dry eschar. Right second toe wound bed granular - small abrasions noted to 3rd and 4th toes. Assessment:      Problem List Items Addressed This Visit        Other    Pressure injury of sacral region, stage 4 ([address]    Relevant Orders    Misc nursing order (specify)    Open wound of second toe of right foot    Relevant Orders    Misc nursing order (specify)    Open wound of second toe of left foot - Primary    Relevant Orders    Misc nursing order (specify)          Performed by: LANE Sweeney - CNP    Wound/Ulcer #: 1, 2 and 3    Wound 08/24/21 Sacrum (Active)   Wound Image   06/30/22 0947   Wound Etiology Pressure Stage 4 06/30/22 0947   Dressing Status Intact; Old drainage noted 06/30/22 0947   Wound Cleansed Cleansed with saline 06/30/22 0947   Dressing/Treatment Moist to dry;ABD 03/28/22 1319   Offloading for Diabetic Foot Ulcers Offloading not required 03/28/22 1319   Wound Length (cm) 4.3 cm 05/12/22 1508   Wound Width (cm) 8 cm 05/12/22 1508   Wound Depth (cm) 2.7 cm 05/12/22 1508   Wound Surface Area (cm^2) 34.4 cm^2 05/12/22 1508   Change in Wound Size % (l*w) -31.05 05/12/22 1508   Wound Volume (cm^3) 92.88 cm^3 05/12/22 1508   Wound Healing % 7 05/12/22 1508   Distance Tunneling (cm) 2.8 cm 05/12/22 1508   Tunneling Position ___ O'Clock 12 05/12/22 1508   Undermining Starts ___ O'Clock 12 03/28/22 1319   Undermining Ends___ O'Clock 12 03/28/22 1319   Undermining Maxium Distance (cm) 3 03/28/22 1319   Wound Assessment Granulation tissue; Devitalized tissue;Slough 06/30/22 0947   Drainage Amount Large 06/30/22 0947   Drainage Description Serosanguinous; Yellow 06/30/22 0947   Odor Moderate 06/30/22 0947   Katey-wound Assessment Dry/flaky;Denuded;Blanchable erythema 06/30/22 0947   Margins Unattached edges 06/30/22 0947   Wound Thickness Description not for Pressure Injury Full thickness 06/30/22 0947   Number of days: 309       Wound 03/13/22 Toe (Comment  which one) Anterior; Left left second toe (Active)   Wound Image   06/30/22 0947   Wound Etiology Other 05/12/22 1508   Dressing Status Intact;Dry 06/30/22 0947   Wound Cleansed Cleansed with saline 06/30/22 0947   Dressing/Treatment Alginate;Dry dressing 03/28/22 1325   Offloading for Diabetic Foot Ulcers Offloading not ordered 03/28/22 1325   Wound Length (cm) 2.3 cm 06/30/22 0947   Wound Width (cm) 2 cm 06/30/22 0947   Wound Depth (cm) 0 cm 06/30/22 0947   Wound Surface Area (cm^2) 4.6 cm^2 06/30/22 0947   Change in Wound Size % (l*w) -2.22 06/30/22 0947   Wound Volume (cm^3) 0 cm^3 06/30/22 0947   Wound Assessment Hyper granulation tissue 05/12/22 1508   Drainage Amount Moderate 05/12/22 1508   Drainage Description Serosanguinous 05/12/22 1508   Odor None 05/12/22 1508   Katey-wound Assessment Dry/flaky; Intact 03/28/22 1325   Margins Attached edges 03/28/22 1325   Number of days: 108       Wound 03/13/22 Toe (Comment  which one) Anterior;Right right second toe (Active)   Wound Image   06/30/22 0947   Wound Etiology Other 05/12/22 1508   Dressing Status Dry; Intact 06/30/22 0947   Wound Cleansed Cleansed with saline 06/30/22 0947   Dressing/Treatment Alginate;Dry dressing 03/28/22 1326   Offloading for Diabetic Foot Ulcers Offloading not ordered 03/28/22 1326   Wound Length (cm) 0.7 cm 05/12/22 1508   Wound Width (cm) 1 cm 05/12/22 1508   Wound Depth (cm) 0 cm 03/28/22 1326   Wound Surface Area (cm^2) 0.7 cm^2 05/12/22 1508   Change in Wound Size % (l*w) 30 05/12/22 1508   Wound Volume (cm^3) 0 cm^3 03/28/22 1326   Wound Assessment Granulation tissue 05/12/22 1508   Drainage Amount Moderate 03/28/22 1326   Drainage Description Sanguinous 03/28/22 1326   Odor None 03/28/22 1326   Katey-wound Assessment Dry/flaky; Intact 03/28/22 1326   Margins Attached edges 03/28/22 1326   Number of days: 108       Wound 04/28/22 Buttocks Left; Inner (Active)   Number of days: 61          Diabetic/Pressure/Non Pressure Ulcers only:  Ulcer: Pressure ulcer, Stage 4       Plan:     Please see attached Discharge Instructions    Based upon this virtual visit it is required to have an in-person visit of this time.    -Stage IV pressure injury, sacrum- Wound appearance worsened today. Hold negative pressure wound therapy for 2 weeks. Start use of Dakin's moistened gauze dressings, changed twice daily. Continue offloading techniques. Turn/reposition frequently. Continue use of Roho cushion to chair during dialysis. Utilize nick for all transfers to limit friction/shearing injury.     -Open wound to right toe-  Stop use of alginate as this is reported to be sticking to wound leading to bleeding with removal.  Start use of collagen and gauze wrap- change daily. Avoid use of shoes, heavy blankets. Continue use of prevalon boots daily. Elevate legs as able to decrease pressure to area. Open wound to left toe- Now a dry eschar. Discontinue alginate. Paint with betadine daily. Continue to avoid use of restrictive footwear that may be contributing to pressure to area. -Irritation to PEG tube insertion site- Start use of desenex powder to irritated skin surrounding insertion site x7 days cover with split gauze dressing. Change daily.     -No indications of infection to wounds noted during today's visit.  Call clinic or seek emergency care should these occur.     -Follow up 2 weeks in person for re-evaluation. Call clinic with any questions or concerns prior to scheduled visit. Written patient dismissal instructions available to Patient/POA       Discharge Instructions       Visit Discharge/Physician Orders:   - wound vac on hold for 2 weeks  - Be sure to offload patients heels at all times. - Turn and reposition at least every 2 hours.    - Limit time up in chair to 1 hour intervals for 3 times daily maximum.   - Low airloss mattress to bed and roho cushion in chair.   - Discontinue creams to groin skin folds,under breasts and around peg tube (for 1 week). Keep areas clean and dry and apply antifungal powder daily as needed.      Home Care: [facility]      Wound Location: Sacrum      Dressing orders:   1) Gather wound care supplies and arrange on clean table. 2) Wash your hands with soap and water or use alcohol based hand  for 20 seconds (sing \"Happy Birthday\" twice). 3) Cleanse wounds with normal saline or wound cleanser and gauze. Pat dry with clean gauze. 4) Sacrum- Apply quarter strength Dakins moistened gauze into the wound bed, Apply zinc oxide/ barrier cream to the skin around the wound and cover with ABD pad and tape into place. Change twice daily   5) left second and third toes- Apply collagen and a moist to dry dressing. Change daily  Right second toe- Apply betadine daily     Keep all dressings clean & dry. Do not shower, take baths or get wound wet, unless otherwise instructed by your Wound Care doctor.      Follow up: 2 weeks 7/15/22 at 9:30am virtual visit                 Cindy Diop AGE: 73 y.o. GENDER: female  : 1950 being evaluated by a Virtual Visit (video visit) encounter to address concerns as mentioned above. A caregiver was present when appropriate.  Due to this being a TeleHealth encounter (During GPDDK-45 public health emergency), evaluation of the following organ systems was limited: Vitals/Constitutional/EENT/Resp/CV/GI//MS/Neuro/Skin/Heme-Lymph-Imm. Pursuant to the emergency declaration under the [address] [place] [address] and the [employer] Act, this Virtual Visit was conducted with patient's (and/or legal guardian's) consent, to reduce the patient's risk of exposure to COVID-19 and provide necessary medical care. The patient (and/or legal guardian) has also been advised to contact this office for worsening conditions or problems, and seek emergency medical treatment and/or call 911 if deemed necessary. Services were provided through a video synchronous discussion virtually to substitute for in-person clinic visit. Patient was located at their individual homes. Provider was located in [place] [address].     Electronically signed by LANE Sweeney CNP on 6/30/2022 at 10:24 AM

## 2022-07-11 ENCOUNTER — HOSPITAL ENCOUNTER (INPATIENT)
Age: 72
LOS: 6 days | Discharge: NURSING, REHAB OR LONG-TERM CARE | DRG: 698 | End: 2022-07-21
Attending: UROLOGY
Payer: MEDICARE

## 2022-07-11 DIAGNOSIS — N20.0 KIDNEY STONE: ICD-10-CM

## 2022-07-11 LAB
ANION GAP SERPL CALCULATED.3IONS-SCNC: 16 MEQ/L (ref 8–16)
BASOPHILIA: ABNORMAL
BASOPHILS # BLD: 0.2 %
BASOPHILS ABSOLUTE: 0 THOU/MM3 (ref 0–0.1)
BUN BLDV-MCNC: 101 MG/DL (ref 7–22)
CALCIUM SERPL-MCNC: 8.7 MG/DL (ref 8.5–10.5)
CHLORIDE BLD-SCNC: 96 MEQ/L (ref 98–111)
CO2: 20 MEQ/L (ref 23–33)
CREAT SERPL-MCNC: 2.7 MG/DL (ref 0.4–1.2)
EOSINOPHIL # BLD: 0.7 %
EOSINOPHILS ABSOLUTE: 0.1 THOU/MM3 (ref 0–0.4)
ERYTHROCYTE [DISTWIDTH] IN BLOOD BY AUTOMATED COUNT: 17.9 % (ref 11.5–14.5)
ERYTHROCYTE [DISTWIDTH] IN BLOOD BY AUTOMATED COUNT: 58.8 FL (ref 35–45)
GFR SERPL CREATININE-BSD FRML MDRD: 17 ML/MIN/1.73M2
GLUCOSE BLD-MCNC: 117 MG/DL (ref 70–108)
HCT VFR BLD CALC: 24.5 % (ref 37–47)
HEMOGLOBIN: 7 GM/DL (ref 12–16)
HYPOCHROMIA: PRESENT
IMMATURE GRANS (ABS): 0.19 THOU/MM3 (ref 0–0.07)
IMMATURE GRANULOCYTES: 0.9 %
LACTIC ACID: 2.4 MMOL/L (ref 0.5–2)
LYMPHOCYTES # BLD: 3.4 %
LYMPHOCYTES ABSOLUTE: 0.7 THOU/MM3 (ref 1–4.8)
MCH RBC QN AUTO: 26.1 PG (ref 26–33)
MCHC RBC AUTO-ENTMCNC: 28.6 GM/DL (ref 32.2–35.5)
MCV RBC AUTO: 91.4 FL (ref 81–99)
MONOCYTES # BLD: 6.1 %
MONOCYTES ABSOLUTE: 1.2 THOU/MM3 (ref 0.4–1.3)
NUCLEATED RED BLOOD CELLS: 0 /100 WBC
PLATELET # BLD: 280 THOU/MM3 (ref 130–400)
PMV BLD AUTO: 8.3 FL (ref 9.4–12.4)
POTASSIUM SERPL-SCNC: 4 MEQ/L (ref 3.5–5.2)
RBC # BLD: 2.68 MILL/MM3 (ref 4.2–5.4)
SCAN OF BLOOD SMEAR: NORMAL
SEG NEUTROPHILS: 88.7 %
SEGMENTED NEUTROPHILS ABSOLUTE COUNT: 18.1 THOU/MM3 (ref 1.8–7.7)
SODIUM BLD-SCNC: 132 MEQ/L (ref 135–145)
WBC # BLD: 20.4 THOU/MM3 (ref 4.8–10.8)

## 2022-07-11 PROCEDURE — 6360000002 HC RX W HCPCS: Performed by: UROLOGY

## 2022-07-11 PROCEDURE — 36415 COLL VENOUS BLD VENIPUNCTURE: CPT

## 2022-07-11 PROCEDURE — 6370000000 HC RX 637 (ALT 250 FOR IP): Performed by: UROLOGY

## 2022-07-11 PROCEDURE — 99222 1ST HOSP IP/OBS MODERATE 55: CPT | Performed by: PHYSICIAN ASSISTANT

## 2022-07-11 PROCEDURE — 96365 THER/PROPH/DIAG IV INF INIT: CPT

## 2022-07-11 PROCEDURE — 85025 COMPLETE CBC W/AUTO DIFF WBC: CPT

## 2022-07-11 PROCEDURE — 80048 BASIC METABOLIC PNL TOTAL CA: CPT

## 2022-07-11 PROCEDURE — 83605 ASSAY OF LACTIC ACID: CPT

## 2022-07-11 PROCEDURE — G0378 HOSPITAL OBSERVATION PER HR: HCPCS

## 2022-07-11 PROCEDURE — 99220 PR INITIAL OBSERVATION CARE/DAY 70 MINUTES: CPT | Performed by: UROLOGY

## 2022-07-11 PROCEDURE — G0379 DIRECT REFER HOSPITAL OBSERV: HCPCS

## 2022-07-11 PROCEDURE — 96361 HYDRATE IV INFUSION ADD-ON: CPT

## 2022-07-11 PROCEDURE — 96366 THER/PROPH/DIAG IV INF ADDON: CPT

## 2022-07-11 PROCEDURE — 2580000003 HC RX 258: Performed by: UROLOGY

## 2022-07-11 PROCEDURE — 99222 1ST HOSP IP/OBS MODERATE 55: CPT | Performed by: INTERNAL MEDICINE

## 2022-07-11 PROCEDURE — 87040 BLOOD CULTURE FOR BACTERIA: CPT

## 2022-07-11 RX ORDER — SODIUM HYPOCHLORITE 1.25 MG/ML
SOLUTION TOPICAL 2 TIMES DAILY
Status: DISCONTINUED | OUTPATIENT
Start: 2022-07-11 | End: 2022-07-21 | Stop reason: HOSPADM

## 2022-07-11 RX ORDER — SODIUM CHLORIDE 0.9 % (FLUSH) 0.9 %
5-40 SYRINGE (ML) INJECTION EVERY 12 HOURS SCHEDULED
Status: DISCONTINUED | OUTPATIENT
Start: 2022-07-11 | End: 2022-07-21 | Stop reason: HOSPADM

## 2022-07-11 RX ORDER — SODIUM CHLORIDE 9 MG/ML
INJECTION, SOLUTION INTRAVENOUS PRN
Status: DISCONTINUED | OUTPATIENT
Start: 2022-07-11 | End: 2022-07-21 | Stop reason: HOSPADM

## 2022-07-11 RX ORDER — BUPROPION HYDROCHLORIDE 75 MG/1
75 TABLET ORAL DAILY
COMMUNITY

## 2022-07-11 RX ORDER — FENTANYL CITRATE 50 UG/ML
50 INJECTION, SOLUTION INTRAMUSCULAR; INTRAVENOUS ONCE
Status: CANCELLED | OUTPATIENT
Start: 2022-07-11 | End: 2022-07-11

## 2022-07-11 RX ORDER — ONDANSETRON 2 MG/ML
4 INJECTION INTRAMUSCULAR; INTRAVENOUS EVERY 6 HOURS PRN
Status: DISCONTINUED | OUTPATIENT
Start: 2022-07-11 | End: 2022-07-21 | Stop reason: HOSPADM

## 2022-07-11 RX ORDER — IPRATROPIUM BROMIDE AND ALBUTEROL SULFATE 2.5; .5 MG/3ML; MG/3ML
3 SOLUTION RESPIRATORY (INHALATION) EVERY 4 HOURS PRN
Status: DISCONTINUED | OUTPATIENT
Start: 2022-07-11 | End: 2022-07-21 | Stop reason: HOSPADM

## 2022-07-11 RX ORDER — DOCUSATE SODIUM 100 MG/1
100 CAPSULE, LIQUID FILLED ORAL DAILY PRN
Status: DISCONTINUED | OUTPATIENT
Start: 2022-07-11 | End: 2022-07-21 | Stop reason: HOSPADM

## 2022-07-11 RX ORDER — BUMETANIDE 1 MG/1
1 TABLET ORAL DAILY
Status: DISCONTINUED | OUTPATIENT
Start: 2022-07-11 | End: 2022-07-21 | Stop reason: HOSPADM

## 2022-07-11 RX ORDER — TRAMADOL HYDROCHLORIDE 50 MG/1
50 TABLET ORAL 2 TIMES DAILY PRN
Status: DISCONTINUED | OUTPATIENT
Start: 2022-07-11 | End: 2022-07-14

## 2022-07-11 RX ORDER — ACETAMINOPHEN 325 MG/1
650 TABLET ORAL EVERY 8 HOURS PRN
Status: DISCONTINUED | OUTPATIENT
Start: 2022-07-11 | End: 2022-07-12

## 2022-07-11 RX ORDER — TRAMADOL HYDROCHLORIDE 50 MG/1
50 TABLET ORAL 2 TIMES DAILY PRN
COMMUNITY

## 2022-07-11 RX ORDER — LANOLIN ALCOHOL/MO/W.PET/CERES
6 CREAM (GRAM) TOPICAL NIGHTLY PRN
Status: DISCONTINUED | OUTPATIENT
Start: 2022-07-11 | End: 2022-07-21 | Stop reason: HOSPADM

## 2022-07-11 RX ORDER — ENOXAPARIN SODIUM 100 MG/ML
40 INJECTION SUBCUTANEOUS DAILY
Status: DISCONTINUED | OUTPATIENT
Start: 2022-07-11 | End: 2022-07-11 | Stop reason: ALTCHOICE

## 2022-07-11 RX ORDER — FOLIC ACID 1 MG/1
1 TABLET ORAL DAILY
Status: DISCONTINUED | OUTPATIENT
Start: 2022-07-12 | End: 2022-07-21 | Stop reason: HOSPADM

## 2022-07-11 RX ORDER — HEPARIN SODIUM 5000 [USP'U]/ML
5000 INJECTION, SOLUTION INTRAVENOUS; SUBCUTANEOUS EVERY 8 HOURS SCHEDULED
Status: DISCONTINUED | OUTPATIENT
Start: 2022-07-11 | End: 2022-07-20

## 2022-07-11 RX ORDER — FAMOTIDINE 20 MG/1
20 TABLET, FILM COATED ORAL DAILY
Status: DISCONTINUED | OUTPATIENT
Start: 2022-07-11 | End: 2022-07-21 | Stop reason: HOSPADM

## 2022-07-11 RX ORDER — SODIUM CHLORIDE 0.9 % (FLUSH) 0.9 %
5-40 SYRINGE (ML) INJECTION PRN
Status: DISCONTINUED | OUTPATIENT
Start: 2022-07-11 | End: 2022-07-21 | Stop reason: HOSPADM

## 2022-07-11 RX ORDER — 0.9 % SODIUM CHLORIDE 0.9 %
500 INTRAVENOUS SOLUTION INTRAVENOUS ONCE
Status: COMPLETED | OUTPATIENT
Start: 2022-07-11 | End: 2022-07-11

## 2022-07-11 RX ORDER — ONDANSETRON 4 MG/1
4 TABLET, ORALLY DISINTEGRATING ORAL EVERY 8 HOURS PRN
Status: DISCONTINUED | OUTPATIENT
Start: 2022-07-11 | End: 2022-07-21 | Stop reason: HOSPADM

## 2022-07-11 RX ORDER — MIDAZOLAM HYDROCHLORIDE 1 MG/ML
1 INJECTION INTRAMUSCULAR; INTRAVENOUS ONCE
Status: CANCELLED | OUTPATIENT
Start: 2022-07-11 | End: 2022-07-11

## 2022-07-11 RX ORDER — SODIUM CHLORIDE 450 MG/100ML
INJECTION, SOLUTION INTRAVENOUS CONTINUOUS
Status: CANCELLED | OUTPATIENT
Start: 2022-07-11

## 2022-07-11 RX ORDER — SODIUM CHLORIDE 9 MG/ML
INJECTION, SOLUTION INTRAVENOUS CONTINUOUS
Status: DISCONTINUED | OUTPATIENT
Start: 2022-07-11 | End: 2022-07-13

## 2022-07-11 RX ORDER — POLYETHYLENE GLYCOL 3350 17 G/17G
17 POWDER, FOR SOLUTION ORAL DAILY PRN
Status: DISCONTINUED | OUTPATIENT
Start: 2022-07-11 | End: 2022-07-21 | Stop reason: HOSPADM

## 2022-07-11 RX ORDER — FLUOXETINE HYDROCHLORIDE 20 MG/1
40 CAPSULE ORAL DAILY
Status: DISCONTINUED | OUTPATIENT
Start: 2022-07-11 | End: 2022-07-11

## 2022-07-11 RX ORDER — SODIUM CHLORIDE 0.9 % (FLUSH) 0.9 %
10 SYRINGE (ML) INJECTION 3 TIMES DAILY
Status: DISCONTINUED | OUTPATIENT
Start: 2022-07-11 | End: 2022-07-21 | Stop reason: HOSPADM

## 2022-07-11 RX ORDER — FERROUS SULFATE 325(65) MG
325 TABLET ORAL EVERY OTHER DAY
Status: DISCONTINUED | OUTPATIENT
Start: 2022-07-11 | End: 2022-07-11

## 2022-07-11 RX ADMIN — SODIUM CHLORIDE, PRESERVATIVE FREE 10 ML: 5 INJECTION INTRAVENOUS at 20:32

## 2022-07-11 RX ADMIN — SODIUM CHLORIDE: 9 INJECTION, SOLUTION INTRAVENOUS at 12:40

## 2022-07-11 RX ADMIN — PIPERACILLIN AND TAZOBACTAM 2250 MG: 2; .25 INJECTION, POWDER, LYOPHILIZED, FOR SOLUTION INTRAVENOUS at 18:14

## 2022-07-11 RX ADMIN — SODIUM CHLORIDE 500 ML: 9 INJECTION, SOLUTION INTRAVENOUS at 10:16

## 2022-07-11 RX ADMIN — PIPERACILLIN AND TAZOBACTAM 2250 MG: 2; .25 INJECTION, POWDER, LYOPHILIZED, FOR SOLUTION INTRAVENOUS at 12:44

## 2022-07-11 RX ADMIN — DAKIN'S SOLUTION 0.125% (QUARTER STRENGTH): 0.12 SOLUTION at 20:31

## 2022-07-11 RX ADMIN — TRAMADOL HYDROCHLORIDE 50 MG: 50 TABLET, COATED ORAL at 18:18

## 2022-07-11 ASSESSMENT — PAIN SCALES - GENERAL
PAINLEVEL_OUTOF10: 0
PAINLEVEL_OUTOF10: 5
PAINLEVEL_OUTOF10: 0

## 2022-07-11 ASSESSMENT — ENCOUNTER SYMPTOMS
SORE THROAT: 0
SHORTNESS OF BREATH: 0
COUGH: 0
VOMITING: 0
NAUSEA: 0
ABDOMINAL PAIN: 1

## 2022-07-11 ASSESSMENT — PAIN DESCRIPTION - ORIENTATION: ORIENTATION: MID

## 2022-07-11 ASSESSMENT — PAIN DESCRIPTION - DESCRIPTORS: DESCRIPTORS: ACHING;DISCOMFORT

## 2022-07-11 ASSESSMENT — PAIN DESCRIPTION - LOCATION: LOCATION: SACRUM

## 2022-07-11 NOTE — PROGRESS NOTES
Pharmacy Renal Adjustment    Sonido Cherry is a 73 y.o. female. Pharmacy consult from [facility] Matias for Zosyn dosing. Height:   Ht Readings from Last 1 Encounters:   06/24/22 4' 9\" (1.448 m)     Weight:  Wt Readings from Last 1 Encounters:   06/24/22 140 lb (63.5 kg)     No results for input(s): BUN, CREATININE in the last 72 hours. Estimated Creatinine Clearance: 16 mL/min (A) (based on SCr of 2.4 mg/dL (H)). Calculated CrCl:    Assessment:  ESRD on HD.  Last HD on Friday (goes to [facility])    Plan:   Change Zosyn to  2.25g IV q8h 30 minute infusion               Jose Marsh, PharmD, BCPS 7/11/2022 9:20 AM

## 2022-07-11 NOTE — H&P
[address] [facility] [address]  [address] [facility] [address]  Dept: [phone]  Loc: [phone]  Visit Date: 7/11/2022           History & Physical    Chief Complaint: neph tube exchange    HISTORY OF PRESENT ILLNESS:                The patient is a 73 y.o. female with significant past medical history of see below who was direct admitted for preop abx prior to nephrostomy tube exchange. albert is here today for preop op abx for neph tube exchange. History was gathered from patient and family at bedside.     Having some low BP on arrival 80/40  Denies pain  Purulent drainage from left nephrostomy tube  Concentrated yellow urine from seaman bag  Multiple large wounds, will have wound care     Past Medical History:        Diagnosis Date    CH[facility]congestive heart failure) ([facility])     Dr. Britany Butler Depression     Gout attack     [facility] [address]     Hypertension     Osteoarthritis     Pulmonary artery hypertension ([facility] [address]     [facility]-- Dr. Promise Andrade-- Dr. Britany Butler Renal calculi     Dr. Jolaine Aschoff [facility])      Past Surgical History:        Procedure Laterality Date   [address]    BRONCHOSCOPY N/A 11/22/2021    BRONCHOSCOPY performed by Aurea Ford MD at [address] 11/30/2021    BRONCHOSCOPY WITHOUT FLURO performed by Aurea Ford MD at [address] N/A 1/27/2022    LAPAROSCOPY WITH DIVERTING LOOP COLOSTOMY performed by Rani Burt MD at [facility] 149 N/A 11/24/2021    EGD PEG TUBE PLACEMENT performed by Carol Madsen MD at [facility]    IR [address]  11/26/2021    IR CHEST TUBE INSERTION 11/26/2021 [facility]    IR NEPHROSTOMY PERCUTANEOUS LEFT  11/1/2021    IR NEPHROSTOMY PERCUTANEOUS LEFT 11/1/2021 MD [facility]    IR NEPHROSTOMY PERCUTANEOUS LEFT 3/14/2022    IR NEPHROSTOMY PERCUTANEOUS LEFT 3/14/2022 Boogie Sosa MD [facility]    PRESSURE ULCER DEBRIDEMENT Right 8/6/2021    EXCISION RIGHT BUTTOCK WOUND AND CLOSURE performed by Amanda Wong MD at [address] History:  Social History     Socioeconomic History    Marital status: Single     Spouse name: Not on file    Number of children: 0    Years of education: Not on file    Highest education level: Not on file   Occupational History    Not on file   Tobacco Use    Smoking status: Never Smoker    Smokeless tobacco: Never Used   Vaping Use    Vaping Use: Never used   Substance and Sexual Activity    Alcohol use: No    Drug use: No    Sexual activity: Not Currently   Other Topics Concern    Not on file   Social History Narrative    Not on file     Social Determinants of Health     Financial Resource Strain: Low Risk     Difficulty of Paying Living Expenses: Not very hard   Food Insecurity: No Food Insecurity    Worried About Running Out of Food in the Last Year: Never true    Traci of Food in the Last Year: Never true   Transportation Needs:     Lack of Transportation (Medical): Not on file    Lack of Transportation (Non-Medical):  Not on file   Physical Activity:     Days of Exercise per Week: Not on file    Minutes of Exercise per Session: Not on file   Stress:     Feeling of Stress : Not on file   Social Connections:     Frequency of Communication with Friends and Family: Not on file    Frequency of Social Gatherings with Friends and Family: Not on file    Attends Sabianist Services: Not on file    Active Member of Clubs or Organizations: Not on file    Attends Club or Organization Meetings: Not on file    Marital Status: Not on file   Intimate Partner Violence:     Fear of Current or Ex-Partner: Not on file    Emotionally Abused: Not on file    Physically Abused: Not on file    Sexually Abused: Not on file   Housing Stability:     Unable to Pay for Housing in the Last Year: Not on file    Number of Places Lived in the Last Year: Not on file    Unstable Housing in the Last Year: Not on file     Family History:  AL  Family History   Problem Relation Age of Onset    Diabetes Father    Yina Talbert Arthritis Mother     COPD Mother     Diabetes Sister     Heart Disease Maternal Uncle     Breast Cancer Niece 36    Sleep Apnea Brother     Asthma Neg Hx     Birth Defects Neg Hx     Cancer Neg Hx     Depression Neg Hx     Early Death Neg Hx     Hearing Loss Neg Hx     High Blood Pressure Neg Hx     High Cholesterol Neg Hx     Kidney Disease Neg Hx     Learning Disabilities Neg Hx     Mental Illness Neg Hx     Mental Retardation Neg Hx     Miscarriages / Stillbirths Neg Hx     Stroke Neg Hx     Substance Abuse Neg Hx     Vision Loss Neg Hx     Other Neg Hx      Allergies:  No Known Allergies    ROS:  Constitutional: Negative for chills, fatigue, fever, or weight loss. Eyes: Denies reported visual changes. ENT: Denies headache, difficulty swallowing, nose bleeds, ringing in ears, or earaches. Cardiovascular: Negative for chest pain, palpitations, tachycardia or edema. Respiratory: Denies cough or SOB. GI:The patient denies abdominal or flank pain, anorexia, nausea or vomiting. : See HPI  Musculoskeletal: Patient denies low back pain or painful or reduced range of motion of the back or extremities. Neurological: The patient denies any symptoms of neurological impairment orTIA's; no history of stroke. Lymphatic: Denies swollen glands in neck, axillary or inguinal areas. Psychiatric: Denies anxiety or depression. Skin: Denies rash or lesions. The remainder of the complete ROS is negative    PHYSICAL EXAM:  VITALS:  BP (!) 80/40   Pulse 84   Temp 99 °F (37.2 °C) (Oral)   Resp 16   Wt 141 lb 3.2 oz (64 kg)   SpO2 93%   BMI 30.56 kg/m² . Nursing note and vitals reviewed.   Constitutional:    Alert and oriented times 3, no acute distress and cooperative to examination with appropriate mood and affect. HEENT:   Head:         Normocephalic and atraumatic. Mouth/Throat:          Mucous membranes are normal.   Eyes:         EOM are normal. No scleral icterus. Nose:    The external appearance of the nose is normal  Ears: The ears appear normal to external inspection   Neck:         Supple, symmetrical, trachea midline, no adenopathy, thyroid symmetric, not enlarged and no tenderness. Cardiovascular:        Normal rate, regular rhythm, S1 S2 heart sounds. Pulmonary/Chest:       Chest symmetric with normal A/P diameter, no wheezes, rales, or rhonchi noted. Normal respiratory rate and rhthym. No use of accessory muscles. Abdominal:          Soft. No tenderness. Left neph tube draining milky colored drainage in tube and saturating dressing around. Peg tube    Genitalia:    Padilla catheter draining yellow concentrated urine with sediment  Musculoskeletal:    Normal range of motion. She exhibits no edema or tenderness of lower extremities. Large sacral wound. Extremities:    Toe ulcerations  Neurological:    Alert and oriented. No cranial nerve deficit. There are no focalizing motor or sensory deficits.     DATA:  CBC:   Lab Results   Component Value Date/Time    WBC 16.6 06/08/2022 08:07 AM    RBC 2.45 06/08/2022 08:07 AM    RBC 4.15 01/09/2018 08:42 AM    HGB 6.8 06/08/2022 08:07 AM    HCT 23.9 06/08/2022 08:07 AM    MCV 97.6 06/08/2022 08:07 AM    MCH 27.8 06/08/2022 08:07 AM    MCHC 28.5 06/08/2022 08:07 AM    RDW 12.9 01/09/2018 08:42 AM     06/08/2022 08:07 AM    MPV 8.4 06/08/2022 08:07 AM     BMP:    Lab Results   Component Value Date/Time     04/28/2022 04:03 AM    K 4.7 04/28/2022 04:03 AM    K 4.1 03/14/2022 04:44 AM    CL 98 04/28/2022 04:03 AM    CO2 23 04/28/2022 04:03 AM    BUN 55 04/28/2022 04:03 AM    CREATININE 2.4 06/08/2022 08:07 AM    CALCIUM 8.4 04/28/2022 04:03 AM    LABGLOM 20 06/08/2022 08:07 AM GLUCOSE 104 04/28/2022 04:03 AM    GLUCOSE 103 01/09/2018 08:42 AM     BUN/Creatinine:    Lab Results   Component Value Date/Time    BUN 55 04/28/2022 04:03 AM    CREATININE 2.4 06/08/2022 08:07 AM     Magnesium:    Lab Results   Component Value Date/Time    MG 1.8 04/16/2022 05:38 AM     Phosphorus:    Lab Results   Component Value Date/Time    PHOS 7.7 04/15/2022 03:44 AM     PT/INR:    Lab Results   Component Value Date/Time    INR 1.07 01/21/2022 01:45 AM     U/A:    Lab Results   Component Value Date/Time    COLORU RED 04/12/2022 06:30 AM    PHUR 6.5 04/12/2022 06:30 AM    LABCAST NONE SEEN 01/20/2022 09:45 PM    LABCAST NONE SEEN 01/20/2022 09:45 PM    WBCUA 50-75W/CLUMPS 04/12/2022 06:30 AM    RBCUA > 200 04/12/2022 06:30 AM    MUCUS OBSCURED 01/19/2022 09:15 AM    MUCUS NONE SEEN 01/19/2022 09:15 AM    YEAST NONE SEEN 04/12/2022 06:30 AM    BACTERIA FEW 04/12/2022 06:30 AM    SPECGRAV 1.014 01/20/2022 09:45 PM    LEUKOCYTESUR MODERATE 04/12/2022 06:30 AM    UROBILINOGEN 0.2 04/12/2022 06:30 AM    BILIRUBINUR NEGATIVE 04/12/2022 06:30 AM    BLOODU LARGE 04/12/2022 06:30 AM    GLUCOSEU NEGATIVE 04/12/2022 06:30 AM    AMORPHOUS URATES 04/12/2022 06:30 AM       IMPRESSION/Plan:    Admitted for preop abx prior to neph tube exchange - will start Zosyn, pharmacy to dose  Sacral wounds - evaluated by wound care 6/30. Will have wound care evaluate while here  Hypotension - BP 80/40, will order CBC/BMP and 500ml fluid bolus.   Discussed with medicine, will consult for hypotension and to medically manage as patient has complicated history    NPO midnight in preparation for nephrotomy tube exchange tomorrow    LANE rFy - CNP, APRN  07/11/22 9:45 AM  Urology

## 2022-07-11 NOTE — CONSULTS
Hospitalist Consult Note      Patient:  Shannan Silva    Unit/Bed:5K-19/019-A  YOB: 1950  MRN: [id number]   Acct: [de-identified]   PCP: Harrison Mauricio MD  Code Status: Full Code  Date of Admission: 7/11/2022  Date of Service: Pt seen/examined in consultation on 7/11/2022      Chief Complaint:  Neph tube exchange  Reason for consult: admitted for preop abx prior to neph rube exchange, hypotensive on arrival. Help with medical management. Assessment and Plan:    1. Catheter associated UTI:  Patient admitted for preop antibiotics prior to next tube exchange. On Zosyn. Check blood cultures, lactic acid. Continue IVF. Urology is primary. 2. Hypotension: secondary to volume depletion. Resolved with 500cc bolus. Continue IVF, monitor closely. Nephrology consulted for assistance with fluid management given patient is on HD. 3. ALVIN on CKD stage III: Patient started on HF in 04/2022. Consult nephrology. 4. Chronic normocytic anemia: anemia of chronic disease, on PARRIS as OP. Hgb stable with baseline, no signs of acute bleeding. 5. Dysphagia: h/o PEG tube, consult dietitian. 6. Chronic HFpEF without deompensaiton: TOBIAS 10/2021 EF 60%. On Bumex, will hold for now as patient appears dry. Add parameters to metoprolol. 7. Hx stage III decub ulcer s/p diverting colostomy 1/27/22: wound care consulted. 8. Anxiety: Resume home meds. History Of Present Illness:    Shannan Silva 73 y.o. female who we are asked to see/evaluate by Angelique Amin MD for medical management of hypotension. Patient directly admitted by urology service for preop antibiotics prior to nephrostomy tube exchange. Patient is chronically ill, has pain for nutrition, diverting colostomy bag for stage III decubitus ulcer, nephrostomy tubes. Patient has history of obstructive staghorn calculus of left kidney, was not a candidate for stone treatment and nephrostomy tube was placed.   Patient with purulent drainage from left nephrostomy tube, concentrated yellow urine in Padilla bag. Patient has multiple large wounds, wound care is following. Patient seen with family at bedside. No acute complaints at this time. She denies fever/chills, chest pain, shortness of breath, abdominal pain, N/V/D. Denies pain at nephrostomy tube site. Hospitalist will continue to follow. Review of systems:   Pertinent positives as noted in the HPI. All other systems reviewed and negative.     Past Medical History:        Diagnosis Date    CH[facility]congestive [facility] failure[facility])     Dr. Denise Lay Depression     Gout attack     [facility] [address]     Hypertension     Osteoarthritis     Pulmonary artery hypertension ([facility] [address]     [facility]-- Dr. Karen Amin-- Dr. Denise Lay Renal calculi     Dr. Elvin Headley [facility])        Past Surgical History:        Procedure Laterality Date    APPENDECTOMY  1960    BRONCHOSCOPY N/A 11/22/2021    BRONCHOSCOPY performed by Sarah Yee MD at [address] 11/30/2021    BRONCHOSCOPY WITHOUT FLURO performed by Sarah Yee MD at [facility]    COLECTOMY N/A 1/27/2022    LAPAROSCOPY WITH DIVERTING LOOP COLOSTOMY performed by Micaela Parrish MD at [facility] N/A 11/24/2021    EGD PEG TUBE PLACEMENT performed by Courtney Amaral MD at [facility]    IR [address]  11/26/2021    IR CHEST TUBE INSERTION 11/26/2021 [facility]    IR NEPHROSTOMY PERCUTANEOUS LEFT  11/1/2021    IR NEPHROSTOMY PERCUTANEOUS LEFT 11/1/2021 Francois Cai MD [facility]    IR NEPHROSTOMY PERCUTANEOUS LEFT  3/14/2022    IR NEPHROSTOMY PERCUTANEOUS LEFT 3/14/2022 Lesley Hanna MD [facility]    PRESSURE ULCER DEBRIDEMENT Right 8/6/2021    EXCISION RIGHT BUTTOCK WOUND AND CLOSURE performed by Debra Brunson MD at [address] [facility] Medications:   No current facility-administered medications on file prior to encounter. Current Outpatient Medications on File Prior to Encounter   Medication Sig Dispense Refill    sertraline (ZOLOFT) 50 MG tablet Take 50 mg by mouth daily      bumetanide (BUMEX) 1 MG tablet Take 1 mg by mouth daily      metoprolol tartrate (LOPRESSOR) 25 MG tablet Take 1 tablet by mouth 2 times daily 60 tablet 3    ipratropium-albuterol (DUONEB) 0.5-2.5 (3) MG/3ML SOLN nebulizer solution Inhale 3 mLs into the lungs every 4 hours as needed for Shortness of Breath 360 mL 0    sodium chloride 0.9 % SOLN Inject as directed Flush TID nephrostomy tube 10ml.  miconazole (MICOTIN) 2 % powder Apply topically daily as needed for Itching Apply topically 2 times daily.  silver nitrate applicators 17-33 % applicator Apply 1 each topically Once a week on Friday and PRN to prowed flesh at peg site      folic acid (FOLVITE) 1 MG tablet Take 1 tablet by mouth daily 30 tablet 3    ferrous sulfate (IRON 325) 325 (65 Fe) MG tablet Take 1 tablet by mouth every other day 30 tablet 1    melatonin 3 MG TABS tablet Take 2 tablets by mouth nightly as needed (anxiety, sleep) 30 tablet 1    famotidine (PEPCID) 20 MG tablet 20 mg by PEG Tube route daily      senna (SENOKOT) 8.8 MG/5ML SYRP syrup Take 5 mLs by mouth nightly as needed      sodium hypochlorite (DAKINS) 0.125 % SOLN external solution Apply Dakin's moistened gauze dressings to wound twice daily and as needed. 1 Bottle 2    FLUoxetine (PROZAC) 40 MG capsule Take 1 capsule by mouth daily 90 capsule 3    Multiple Vitamins-Minerals (MULTIVITAMIN WOMEN PO) Take 1 tablet by mouth daily      acetaminophen (TYLENOL) 650 MG extended release tablet Take 650 mg by mouth every 8 hours as needed for Pain      docusate sodium (COLACE) 100 MG capsule Take 100 mg by mouth as needed for Constipation (Daily PRN)          Allergies:    Patient has no known allergies.     Social History:    reports that she has normal insight  Capillary Refill: Brisk,< 3 seconds   Peripheral Pulses: +2 palpable, equal bilaterally     Labs:   Recent Labs     07/11/22  1001   WBC 20.4*   HGB 7.0*   HCT 24.5*        Recent Labs     07/11/22  1001   *   K 4.0   CL 96*   CO2 20*   *   CREATININE 2.7*   CALCIUM 8.7     No results for input(s): AST, ALT, BILIDIR, BILITOT, ALKPHOS in the last 72 hours. No results for input(s): INR in the last 72 hours. No results for input(s): [place] Shackle in the last 72 hours. Urinalysis:    Lab Results   Component Value Date/Time    NITRU NEGATIVE 04/12/2022 06:30 AM    WBCUA 50-75W/CLUMPS 04/12/2022 06:30 AM    BACTERIA FEW 04/12/2022 06:30 AM    RBCUA > 200 04/12/2022 06:30 AM    BLOODU LARGE 04/12/2022 06:30 AM    SPECGRAV 1.014 01/20/2022 09:45 PM    GLUCOSEU NEGATIVE 04/12/2022 06:30 AM       Radiology:   No orders to display     No results found. EKG: No prior ECG      Thank you for allowing us to participate in this patient's care. Please do not hesitate to call us directly with further questions.      Electronically signed by Kassie Mcnally PA-C on 7/11/2022 at 12:26 PM

## 2022-07-11 NOTE — PLAN OF CARE
Problem: Chronic Conditions and Co-morbidities  Goal: Patient's chronic conditions and co-morbidity symptoms are monitored and maintained or improved  Outcome: Progressing  Flowsheets (Taken 7/11/2022 0830)  Care Plan - Patient's Chronic Conditions and Co-Morbidity Symptoms are Monitored and Maintained or Improved: Monitor and assess patient's chronic conditions and comorbid symptoms for stability, deterioration, or improvement     Problem: Safety - Adult  Goal: Free from fall injury  Outcome: Progressing  Flowsheets (Taken 7/11/2022 1607)  Free From Fall Injury: Instruct family/caregiver on patient safety     Problem: ABCDS Injury Assessment  Goal: Absence of physical injury  Outcome: Progressing  Flowsheets (Taken 7/11/2022 1607)  Absence of Physical Injury: Implement safety measures based on patient assessment     Problem: Skin/Tissue Integrity  Goal: Absence of new skin breakdown  Description: 1. Monitor for areas of redness and/or skin breakdown  2. Assess vascular access sites hourly  3. Every 4-6 hours minimum:  Change oxygen saturation probe site  4. Every 4-6 hours:  If on nasal continuous positive airway pressure, respiratory therapy assess nares and determine need for appliance change or resting period. Outcome: Progressing  Note: Monitor for areas of redness and or skin breakdown      Care plan reviewed with patient and sister. Patient and sister verbalize understanding of the plan of care and contribute to goal setting.

## 2022-07-11 NOTE — CARE COORDINATION
7/11/22, 1:58 PM EDT  DISCHARGE PLANNING EVALUATION:    Maureen Arauz       Admitted: 7/11/2022/ [address] day: 0   Location: [facility]019[facility]A Reason for admit: Staghorn calculus [N20.0]   PMH:  has a past medical history of CHF (congestive heart failure) ([place] Depression, Gout attack, Hemodialysis patient ([facility] [address] Hypertension, Osteoarthritis, Pulmonary artery hypertension ([facility] [address] Renal calculi, and Thrombocytopenia ([facility] [address] Procedure: 7/12/2022 planned nephrostomy tube exchange. Barriers to Discharge:  Patient was a direct admit for pre-op antibiotic therapy. Hospitalist and Nephrology consults, IV fluids, home medications addressed, Heparin subq, Zosyn, wound care with Dakins solution, prn Tylenol, Zofran, Duoneb nebulizer, BMP and CBC in a.m., blood cultures, lactic acid level, regular diet, strain all urine, up with assistance, consults to MILAGRO ESCOBAR, and Dietician. PCP: Ese Powell MD   %  Patient's Healthcare Decision Maker: Named in [address] [place] [address]    Patient Goals/Plan/Treatment Preferences: Karen Serna is from [facility] [address] HUMPHREY following for her return. Transportation/Food Security/Housekeeping Addressed:  No issues identified.

## 2022-07-11 NOTE — CONSULTS
COLOSTOMY performed by Micaela Parrish MD at [facility] 149 N/A 11/24/2021    EGD PEG TUBE PLACEMENT performed by Courtney Amaral MD at [address] Endoscopy    IR [address]  11/26/2021    IR CHEST TUBE INSERTION 11/26/2021 [facility]    IR NEPHROSTOMY PERCUTANEOUS LEFT  11/1/2021    IR NEPHROSTOMY PERCUTANEOUS LEFT 11/1/2021 Francois Cai MD [facility]    IR NEPHROSTOMY PERCUTANEOUS LEFT  3/14/2022    IR NEPHROSTOMY PERCUTANEOUS LEFT 3/14/2022 Lesley Hanna MD [facility]    PRESSURE ULCER DEBRIDEMENT Right 8/6/2021    EXC[facility] RIGHT BUTTOCK [facility] History     Socioeconomic History    Marital status: Single     Spouse name: Not on file    Number of children: 0    Years of education: Not on file    Highest education level: Not on file   Occupational History    Not on file   Tobacco Use    Smoking status: Never Smoker    Smokeless tobacco: Never Used   Vaping Use    Vaping Use: Never used   Substance and Sexual Activity    Alcohol use: No    Drug use: No    Sexual activity: Not Currently   Other Topics Concern    Not on file   Social History Narrative    Not on file     Social Determinants of Health     Financial Resource Strain: Low Risk     Difficulty of Paying Living Expenses: Not very hard   Food Insecurity: No Food Insecurity    Worried About Running Out of Food in the Last Year: Never true    Traci of Food in the Last Year: Never true   Transportation Needs:     Lack of Transportation (Medical): Not on file    Lack of Transportation (Non-Medical):  Not on file   Physical Activity:     Days of Exercise per Week: Not on file    Minutes of Exercise per Session: Not on file   Stress:     Feeling of Stress : Not on file   Social Connections:     Frequency of Communication with Friends and Family: Not on file    Frequency of Social Gatherings with Friends and Family: Not on file    Attends Catholic Services: Not on file    Active Member of Clubs or Organizations: Not on file    Attends Club or Organization Meetings: Not on file    Marital Status: Not on file   Intimate Partner Violence:     Fear of Current or Ex-Partner: Not on file    Emotionally Abused: Not on file    Physically Abused: Not on file    Sexually Abused: Not on file   Housing Stability:     Unable to Pay for Housing in the Last Year: Not on file    Number of Places Lived in the Last Year: Not on file    Unstable Housing in the Last Year: Not on file     Family History   Problem Relation Age of Onset    Diabetes Father     Arthritis Mother     COPD Mother     Diabetes Sister     Heart Disease Maternal Uncle     Breast Cancer Niece 36    Sleep Apnea Brother     Asthma Neg Hx     Birth Defects Neg Hx     Cancer Neg Hx     Depression Neg Hx     Early Death Neg Hx     Hearing Loss Neg Hx     High Blood Pressure Neg Hx     High Cholesterol Neg Hx     Kidney Disease Neg Hx     Learning Disabilities Neg Hx     Mental Illness Neg Hx     Mental Retardation Neg Hx     Miscarriages / Stillbirths Neg Hx     Stroke Neg Hx     Substance Abuse Neg Hx     Vision Loss Neg Hx     Other Neg Hx      Medications & Allergies      Prior to Admission medications    Medication Sig Start Date End Date Taking? Authorizing Provider   buPROPion (WELLBUTRIN) 75 MG tablet Take 75 mg by mouth daily   Yes Historical Provider, MD   traMADol (ULTRAM) 50 MG tablet Take 50 mg by mouth 2 times daily as needed for Pain (moderate to severe pain).    Yes Historical Provider, MD   sertraline (ZOLOFT) 50 MG tablet Take 25 mg by mouth daily     Historical Provider, MD   bumetanide (BUMEX) 1 MG tablet Take 2 mg by mouth daily     Historical Provider, MD   metoprolol tartrate (LOPRESSOR) 25 MG tablet Take 1 tablet by mouth 2 times daily 4/26/22   Stiven Molina DO ipratropium-albuterol (DUONEB) 0.5-2.5 (3) MG/3ML SOLN nebulizer solution Inhale 3 mLs into the lungs every 4 hours as needed for Shortness of Breath 4/26/22   Stiven Molina, DO   sodium chloride 0.9 % SOLN Inject as directed Flush TID nephrostomy tube 10ml. Historical Provider, MD   miconazole (MICOTIN) 2 % powder Apply topically daily as needed for Itching Apply topically 2 times daily. Historical Provider, MD   silver nitrate applicators 83-73 % applicator Apply 1 each topically Once a week on Friday and PRN to prowed flesh at peg site    Historical Provider, MD   folic acid (FOLVITE) 1 MG tablet Take 1 tablet by mouth daily 2/4/22   Cherelle Santillan, DO   melatonin 3 MG TABS tablet Take 2 tablets by mouth nightly as needed (anxiety, sleep) 2/4/22   Pastor Akers, DO   famotidine (PEPCID) 20 MG tablet 20 mg by PEG Tube route daily    Historical Provider, MD   senna (SENOKOT) 8.8 MG/5ML SYRP syrup Take 5 mLs by mouth nightly as needed  Patient not taking: Reported on 7/11/2022    Historical Provider, MD   sodium hypochlorite (DAKINS) 0.125 % SOLN external solution Apply Dakin's moistened gauze dressings to wound twice daily and as needed. 8/24/21   Chantal Roberts, APRN - CNP   Multiple Vitamins-Minerals (MULTIVITAMIN WOMEN PO) Take 1 tablet by mouth daily    Historical Provider, MD   acetaminophen (TYLENOL) 650 MG extended release tablet Take 650 mg by mouth every 8 hours as needed for Pain    Historical Provider, MD   docusate sodium (COLACE) 100 MG capsule Take 100 mg by mouth as needed for Constipation (Daily PRN)     Historical Provider, MD     Allergies: Patient has no known allergies.   IP meds : Scheduled Meds:   [Held by provider] bumetanide  1 mg Oral Daily    famotidine  20 mg PEG Tube Daily    [START ON 6/63/0415] folic acid  1 mg Oral Daily    metoprolol tartrate  25 mg Oral BID    [START ON 7/12/2022] sertraline  25 mg Oral Daily    sodium chloride flush  10 mL IntraCATHeter TID    sodium hypochlorite   Irrigation BID    sodium chloride flush  5-40 mL IntraVENous 2 times per day    piperacillin-tazobactam  2,250 mg IntraVENous Q8H    [Held by provider] heparin (porcine)  5,000 Units SubCUTAneous 3 times per day     Continuous Infusions:   sodium chloride 50 mL/hr at 07/11/22 1240    sodium chloride       Review of Systems Physical Exam   Review of Systems   Constitutional: Positive for fatigue. Negative for activity change and fever. HENT: Negative. Negative for sore throat. Respiratory: Negative for cough and shortness of breath. Cardiovascular: Negative for chest pain and leg swelling. Gastrointestinal: Positive for abdominal pain. Negative for nausea and vomiting. Genitourinary: Negative for dysuria, hematuria and urgency. Skin: Positive for wound. Negative for rash. Neurological: Negative for dizziness, light-headedness and headaches. Physical Exam  Vitals reviewed. Constitutional:       General: She is not in acute distress. Appearance: She is ill-appearing. HENT:      Head: Normocephalic and atraumatic. Right Ear: External ear normal.      Left Ear: External ear normal.      Mouth/Throat:      Mouth: Mucous membranes are moist.   Eyes:      Comments: Lids normal appearence   Neck:      Thyroid: No thyromegaly. Vascular: No JVD. Cardiovascular:      Heart sounds: Normal heart sounds. No murmur heard. No friction rub. No gallop. Pulmonary:      Effort: Pulmonary effort is normal.      Breath sounds: Normal breath sounds. Chest:      Chest wall: No tenderness. Abdominal:      General: Bowel sounds are normal. There is no distension. Palpations: Abdomen is soft. Tenderness: There is abdominal tenderness. Musculoskeletal:      Cervical back: Normal range of motion and neck supple. Skin:     General: Skin is warm and dry. Findings: No erythema or rash. Neurological:      General: No focal deficit present.       Mental Status: She is alert and oriented to person, place, and time. Psychiatric:         Mood and Affect: Mood normal.         Behavior: Behavior normal.           Vitals:    07/11/22 1529   BP: (!) 100/51   Pulse: (!) 104   Resp: 16   Temp: 98.7 °F (37.1 °C)   SpO2: 92%     Labs, Radiology and Tests       Recent Labs     07/11/22  1001   WBC 20.4*   RBC 2.68*   HGB 7.0*   HCT 24.5*   MCV 91.4   MCH 26.1   MCHC 28.6*        Recent Labs     07/11/22  1001   *   K 4.0   CL 96*   CO2 20*   *   CREATININE 2.7*   CALCIUM 8.7       Radiology :    Other :     Assessment    1 Renal -acute kidney injury needing hemodialysis  ? Based on the BMP her creatinine is 2.7 with a GFR of 17 however her BUN is significantly elevated at 100  ? No acute need for dialysis today  ? Okay to continue some IV fluids for now    2 Electrolytes -mild hyponatremia secondary to renal dysfunction  3 Mild metabolic acidosis with some elevated lactic acid currently on IV fluids  4 Leukocytosis-getting some antibiotics  5 Chronic bilateral obstruction due to staghorn calculi will have a nephrostomy tube replaced tomorrow  6 Hypotension status post IV fluids currently better  7 Meds reviewed and discussed with patient      **This report has been created using voice recognition software. It maycontain minor  errors which are inherent in voice recognition technology. **    Mayra Meek MD,M.D  [facility].

## 2022-07-11 NOTE — PROGRESS NOTES
[facility] Contin[facility] Nurse  Progress Note       Margarito Glass  AGE: 73 y.o. GENDER: female  : 1950  UNIT: 5K-19/[id number]  TODAY'S DATE:  2022  ADMISSION DATE: 2022  8:49 AM  Subjective:     Reason for [facility] Evaluation and Assessment: large sacral wound, ostomy, and several other wounds      Margarito Glass is a 73 y.o. female referred by:   [] Physician/PA/APRN  [x] Nursing  [] Other:     Wound Identification:  Wound Type: pressure  Contributing Factors: chronic pressure, decreased mobility and shear force    Objective:     Al Risk Score:      Assessment:     Encounter: Present to patient room. Patient in bed upon arrival. Assessment and photo to follow. Family member at bedside. Dressings removed from bilateral feet. Patient noted to have unstageable wounds to right and left posterior lower legs, right posterior heel, and left second toe. Cleansed wound with normal saline and gauze. Pat dry with clean gauze. Applied normal saline moistened gauze to leg and ankle wounds, covered with ABD pads, wrapped with kerlix, secured with tape. Patient has order for Dakins 0.125%. Recommend RN to change dressings BID (according to eMAR) once approved by pharmacist. Walker Starling foam applied to left third toe. Change daily. Assisted to left side. Patient noted to have evolving DTPIs to right hip and ischium. Cleansed wound with normal saline and gauze. Pat dry with clean gauze. Applied Triad to wounds, covered with bordered foams. Staff to change daily. Dressing removed from sacrum. Patient noted to have stage 4 to sacrum, POA. Cleansed wound with normal saline and gauze. Pat dry with clean gauze. Wound lightly packed with normal saline moistened kerlix, covered with ABD pad, secured with tape. Staff to change each shift and when soiled. Chux pad changed. Pillow placed under left hip. BLE offloaded with pillows. Recommend use of offloading boots.  Pt on hercules dream air support surface with alternating pressure and microclimate control. Bed in low, call light in reach. Will continue to follow and assess wound. Call with concerns and for wound evolution. Wound type: Right posterior lower extremity unstageable, POA  Wound size: 2cm x 1cm x 1cm  Undermining or Tunneling: None  Wound assessment/color: Black, red  Drainage amount:  Moderate  Drainage description: Sanguinous, serosanguinous  Odor: None  Margins: Attached  Katey wound: Fragile, nonblanchable redness  Exposed structure: None      Wound: Unstageable wound right posterior heel, POA      Wound type: Unstageable wound to left third toe, evolving DTPI left second toe, POA  Wound size: left third toe 0.5cm x 0.5cm x 0.05cm  Undermining or Tunneling: None  Wound assessment/color: Yellow  Drainage amount: Small  Drainage description: Seropurulent  Odor: None  Margins: Attached  Katey wound: Erythema  Exposed structure: None      Wound type: Unstageable wound posterior left lower leg, POA  Wound size: 2.5cm x 3cm x 0.1cm  Undermining or Tunneling: None  Wound assessment/color: Yellow  Drainage amount: Small  Drainage description: Seropurulent  Odor: None  Margins: Attached  Katey wound: Erythema  Exposed structure: None      Wound type: Evolving DTPI right hip and ischium, POA  Wound size: proximal 2cm x 3cm x 0.1cm; distal 0.5cm x 1cm x 0.1cm; right ischium 5cm x 1.2cm x 0.1cm  Undermining or Tunneling: None  Wound assessment/color: Yellow, purple, red  Drainage amount: moderate  Drainage description: Serosanguinous  Odor: None  Margins: Attached  Katey wound: Erythema  Exposed structure: None      Wound type: Chronic stage 4 sacral wound, POA  Wound size: 11cm x 9.5cm x 4.8cm  Undermining or Tunneling: undermining from 9-4 of 3.2cm  Wound assessment/color: Yellow  Drainage amount: Large  Drainage description: Serosanguinous  Odor: None  Margins: Attached  Katey wound: Erythema  Exposed structure: None    Response to treatment:  Poorly tolerated by patient., With complaints of pain. Plan:     Treatment Recommendations:   See wound care orders.     Specialty Bed Required :   [x] Low Air Loss   [x] Pressure Redistribution  [] Fluid Immersion- Dolphin  [] Bariatric  [] RotoProne   [] Other:     Discharge Plan:  Placement for patient upon discharge: from [facility]  Patient appropriate for [address]: Yes    [address] [place] 7/15/22 at 9:30am

## 2022-07-12 ENCOUNTER — APPOINTMENT (OUTPATIENT)
Dept: INTERVENTIONAL RADIOLOGY/VASCULAR | Age: 72
DRG: 698 | End: 2022-07-12
Attending: UROLOGY
Payer: MEDICARE

## 2022-07-12 LAB
ABO: NORMAL
ANION GAP SERPL CALCULATED.3IONS-SCNC: 15 MEQ/L (ref 8–16)
BASOPHILS # BLD: 0.5 %
BASOPHILS ABSOLUTE: 0.1 THOU/MM3 (ref 0–0.1)
BUN BLDV-MCNC: 99 MG/DL (ref 7–22)
CALCIUM SERPL-MCNC: 8.5 MG/DL (ref 8.5–10.5)
CHLORIDE BLD-SCNC: 100 MEQ/L (ref 98–111)
CO2: 19 MEQ/L (ref 23–33)
CREAT SERPL-MCNC: 2.7 MG/DL (ref 0.4–1.2)
EKG ATRIAL RATE: 118 BPM
EKG P AXIS: 67 DEGREES
EKG P-R INTERVAL: 176 MS
EKG Q-T INTERVAL: 312 MS
EKG QRS DURATION: 88 MS
EKG QTC CALCULATION (BAZETT): 437 MS
EKG R AXIS: 69 DEGREES
EKG T AXIS: 45 DEGREES
EKG VENTRICULAR RATE: 118 BPM
EOSINOPHIL # BLD: 0.7 %
EOSINOPHILS ABSOLUTE: 0.1 THOU/MM3 (ref 0–0.4)
ERYTHROCYTE [DISTWIDTH] IN BLOOD BY AUTOMATED COUNT: 18 % (ref 11.5–14.5)
ERYTHROCYTE [DISTWIDTH] IN BLOOD BY AUTOMATED COUNT: 58.7 FL (ref 35–45)
GFR SERPL CREATININE-BSD FRML MDRD: 17 ML/MIN/1.73M2
GLUCOSE BLD-MCNC: 79 MG/DL (ref 70–108)
HCT VFR BLD CALC: 23.1 % (ref 37–47)
HCT VFR BLD CALC: 23.9 % (ref 37–47)
HEMOGLOBIN: 6.6 GM/DL (ref 12–16)
HEMOGLOBIN: 6.7 GM/DL (ref 12–16)
HYPOCHROMIA: PRESENT
IMMATURE GRANS (ABS): 0.17 THOU/MM3 (ref 0–0.07)
IMMATURE GRANULOCYTES: 1 %
LACTIC ACID: 0.8 MMOL/L (ref 0.5–2)
LYMPHOCYTES # BLD: 3.1 %
LYMPHOCYTES ABSOLUTE: 0.5 THOU/MM3 (ref 1–4.8)
MCH RBC QN AUTO: 25.6 PG (ref 26–33)
MCHC RBC AUTO-ENTMCNC: 28 GM/DL (ref 32.2–35.5)
MCV RBC AUTO: 91.2 FL (ref 81–99)
MONOCYTES # BLD: 5.1 %
MONOCYTES ABSOLUTE: 0.9 THOU/MM3 (ref 0.4–1.3)
NUCLEATED RED BLOOD CELLS: 0 /100 WBC
PLATELET # BLD: 291 THOU/MM3 (ref 130–400)
PMV BLD AUTO: 8.6 FL (ref 9.4–12.4)
POTASSIUM SERPL-SCNC: 4 MEQ/L (ref 3.5–5.2)
PROCALCITONIN: 2.02 NG/ML (ref 0.01–0.09)
RBC # BLD: 2.62 MILL/MM3 (ref 4.2–5.4)
RH FACTOR: NORMAL
SEG NEUTROPHILS: 89.6 %
SEGMENTED NEUTROPHILS ABSOLUTE COUNT: 15.8 THOU/MM3 (ref 1.8–7.7)
SELECTED GEL ANTIBODY SCREEN: NORMAL
SODIUM BLD-SCNC: 134 MEQ/L (ref 135–145)
WBC # BLD: 17.6 THOU/MM3 (ref 4.8–10.8)

## 2022-07-12 PROCEDURE — 2580000003 HC RX 258: Performed by: FAMILY MEDICINE

## 2022-07-12 PROCEDURE — 96366 THER/PROPH/DIAG IV INF ADDON: CPT

## 2022-07-12 PROCEDURE — 86922 COMPATIBILITY TEST ANTIGLOB: CPT

## 2022-07-12 PROCEDURE — 80048 BASIC METABOLIC PNL TOTAL CA: CPT

## 2022-07-12 PROCEDURE — 85025 COMPLETE CBC W/AUTO DIFF WBC: CPT

## 2022-07-12 PROCEDURE — 99226 PR SBSQ OBSERVATION CARE/DAY 35 MINUTES: CPT | Performed by: PHYSICIAN ASSISTANT

## 2022-07-12 PROCEDURE — 84145 PROCALCITONIN (PCT): CPT

## 2022-07-12 PROCEDURE — G0378 HOSPITAL OBSERVATION PER HR: HCPCS

## 2022-07-12 PROCEDURE — 36415 COLL VENOUS BLD VENIPUNCTURE: CPT

## 2022-07-12 PROCEDURE — 86901 BLOOD TYPING SEROLOGIC RH(D): CPT

## 2022-07-12 PROCEDURE — 99232 SBSQ HOSP IP/OBS MODERATE 35: CPT | Performed by: INTERNAL MEDICINE

## 2022-07-12 PROCEDURE — 2580000003 HC RX 258: Performed by: UROLOGY

## 2022-07-12 PROCEDURE — 85014 HEMATOCRIT: CPT

## 2022-07-12 PROCEDURE — 93005 ELECTROCARDIOGRAM TRACING: CPT | Performed by: PHYSICIAN ASSISTANT

## 2022-07-12 PROCEDURE — 85018 HEMOGLOBIN: CPT

## 2022-07-12 PROCEDURE — 6360000002 HC RX W HCPCS: Performed by: UROLOGY

## 2022-07-12 PROCEDURE — 36430 TRANSFUSION BLD/BLD COMPNT: CPT

## 2022-07-12 PROCEDURE — P9016 RBC LEUKOCYTES REDUCED: HCPCS

## 2022-07-12 PROCEDURE — 86885 COOMBS TEST INDIRECT QUAL: CPT

## 2022-07-12 PROCEDURE — 86905 BLOOD TYPING RBC ANTIGENS: CPT

## 2022-07-12 PROCEDURE — 83605 ASSAY OF LACTIC ACID: CPT

## 2022-07-12 PROCEDURE — 96361 HYDRATE IV INFUSION ADD-ON: CPT

## 2022-07-12 PROCEDURE — 86900 BLOOD TYPING SEROLOGIC ABO: CPT

## 2022-07-12 PROCEDURE — 93010 ELECTROCARDIOGRAM REPORT: CPT | Performed by: INTERNAL MEDICINE

## 2022-07-12 RX ORDER — 0.9 % SODIUM CHLORIDE 0.9 %
1000 INTRAVENOUS SOLUTION INTRAVENOUS ONCE
Status: COMPLETED | OUTPATIENT
Start: 2022-07-12 | End: 2022-07-12

## 2022-07-12 RX ORDER — SODIUM CHLORIDE 9 MG/ML
INJECTION, SOLUTION INTRAVENOUS PRN
Status: DISCONTINUED | OUTPATIENT
Start: 2022-07-12 | End: 2022-07-21 | Stop reason: HOSPADM

## 2022-07-12 RX ORDER — ACETAMINOPHEN 325 MG/1
650 TABLET ORAL EVERY 8 HOURS PRN
Status: DISCONTINUED | OUTPATIENT
Start: 2022-07-12 | End: 2022-07-21 | Stop reason: HOSPADM

## 2022-07-12 RX ADMIN — SODIUM CHLORIDE, PRESERVATIVE FREE 10 ML: 5 INJECTION INTRAVENOUS at 22:55

## 2022-07-12 RX ADMIN — PIPERACILLIN AND TAZOBACTAM 2250 MG: 2; .25 INJECTION, POWDER, LYOPHILIZED, FOR SOLUTION INTRAVENOUS at 02:38

## 2022-07-12 RX ADMIN — SODIUM CHLORIDE: 9 INJECTION, SOLUTION INTRAVENOUS at 09:22

## 2022-07-12 RX ADMIN — SODIUM CHLORIDE, PRESERVATIVE FREE 10 ML: 5 INJECTION INTRAVENOUS at 09:24

## 2022-07-12 RX ADMIN — PIPERACILLIN AND TAZOBACTAM 2250 MG: 2; .25 INJECTION, POWDER, LYOPHILIZED, FOR SOLUTION INTRAVENOUS at 18:18

## 2022-07-12 RX ADMIN — SODIUM CHLORIDE, PRESERVATIVE FREE 20 ML: 5 INJECTION INTRAVENOUS at 22:54

## 2022-07-12 RX ADMIN — PIPERACILLIN AND TAZOBACTAM 2250 MG: 2; .25 INJECTION, POWDER, LYOPHILIZED, FOR SOLUTION INTRAVENOUS at 10:57

## 2022-07-12 RX ADMIN — SODIUM CHLORIDE 1000 ML: 9 INJECTION, SOLUTION INTRAVENOUS at 00:43

## 2022-07-12 ASSESSMENT — PAIN SCALES - GENERAL
PAINLEVEL_OUTOF10: 0

## 2022-07-12 NOTE — PROGRESS NOTES
[facility]   Nephrology progress note  7/12/2022, 9:16 AM      Pt Name:    Suze Gomez  MRN:     [id number]     YOB: 1950  Admit Date:    7/11/2022  8:49 AM    Chief Complaint: Nephrology following for Acute kidney injury needing hemodialysis . Subjective:  Patient seen and examined  No chest pain or shortness of breath  Feels okay.   Denies any abdominal pain    Objective:  24HR INTAKE/OUTPUT:    Intake/Output Summary (Last 24 hours) at 7/12/2022 0916  Last data filed at 7/11/2022 2032  Gross per 24 hour   Intake 10 ml   Output 0 ml   Net 10 ml      Admission weight: 141 lb 3.2 oz (64 kg)  Wt Readings from Last 3 Encounters:   07/11/22 141 lb 3.2 oz (64 kg)   06/24/22 140 lb (63.5 kg)   05/31/22 140 lb (63.5 kg)        Vitals :   Vitals:    07/11/22 1529 07/11/22 2026 07/12/22 0015 07/12/22 0230   BP: (!) 100/51 (!) 92/52 (!) 88/44 (!) 96/54   Pulse: (!) 104 90     Resp: 16 16     Temp: 98.7 °F (37.1 °C) 98.6 °F (37 °C)     TempSrc: Oral Oral     SpO2: 92% 94%     Weight:           Physical examination  General Appearance: Cachectic ill nourished no distress  Mouth/Throat:  Oral mucosa moist  Neck:  Supple, no JVD  Lungs:  Breath sounds: clear  Heart[de-identified]  S1,S2 heard  Abdomen:  Soft, non - tender  Musculoskeletal:  Edema -no significant edema noted    Medications:  Infusion:    sodium chloride      sodium chloride 50 mL/hr at 07/11/22 1240    sodium chloride       Meds:    [Held by provider] bumetanide  1 mg Oral Daily    famotidine  20 mg PEG Tube Daily    folic acid  1 mg Oral Daily    [Held by provider] metoprolol tartrate  25 mg Oral BID    sertraline  25 mg Oral Daily    sodium chloride flush  10 mL IntraCATHeter TID    sodium hypochlorite   Irrigation BID    sodium chloride flush  5-40 mL IntraVENous 2 times per day    piperacillin-tazobactam  2,250 mg IntraVENous Q8H    [Held by provider] heparin (porcine)  5,000 Units SubCUTAneous 3 times per day     Meds prn: sodium chloride, acetaminophen, docusate sodium, ipratropium-albuterol, melatonin, sodium chloride flush, sodium chloride, ondansetron **OR** ondansetron, polyethylene glycol, traMADol     Lab Data :  CBC:   Recent Labs     07/11/22  1001 07/12/22  0545   WBC 20.4* 17.6*   HGB 7.0* 6.7*   HCT 24.5* 23.9*    291     CMP:  Recent Labs     07/11/22  1001 07/12/22  0545   * 134*   K 4.0 4.0   CL 96* 100   CO2 20* 19*   * 99*   CREATININE 2.7* 2.7*   GLUCOSE 117* 79   CALCIUM 8.7 8.5     Hepatic: No results for input(s): LABALBU, AST, ALT, ALB, BILITOT, ALKPHOS in the last 72 hours. Assessment and Plan:  1. Renal -acute kidney injury needing hemodialysis  ? Based on the BMP her creatinine is 2.7 with a GFR of 17 however her BUN is significantly elevated at 100  ? No acute need for dialysis today getting some IV fluids BUN is still around 99  ? Okay to continue some IV fluids for now     2. Electrolytes -mild hyponatremia secondary to renal dysfunction improved with IV fluids  3. Mild metabolic acidosis with some elevated lactic acid currently on IV fluids  4. Leukocytosis-getting some antibiotics  5. Chronic bilateral obstruction due to staghorn calculi will have a nephrostomy tube replaced tomorrow  6. Hypotension status post IV fluids currently better  7. Meds reviewed and discussed with patient       Malvin Schwab, MD  [facility]    This report has been created using voice recognition software.  It may contain minor errors which are inherent in voice recognition technology

## 2022-07-12 NOTE — PROGRESS NOTES
This RN and tech entered room to complete dressing change. Patient refused to complete this at this time. Patient then stated that she will do it later in the morning. Will pass along to day shift.

## 2022-07-12 NOTE — PROGRESS NOTES
Comprehensive Nutrition Assessment    Type and Reason for Visit:  Initial,Consult,Wound    Nutrition Recommendations/Plan:   1. When able to resume nutrition, recommend tube feeding at regimen at [facility]: Nepro at 55ml/ hour x 12 hours daily (6PM to 6AM)  2. Recommend 30ml water flush before and 30ml water flush after nocturnal tube feed, additional water flush per Doctor  3. Diet at F: mechanical soft/ minced and moist diet, thin liquids, 2000ml fluid restriction     Malnutrition Assessment:  Malnutrition Status: At risk for malnutrition (Comment) (07/12/22 1326)    Context:  Chronic Illness     Findings of the 6 clinical characteristics of malnutrition:  Energy Intake:  No significant decrease in energy intake (patient has been receiving tube feeding since 11/24/21, tolerating at [facility] prior to admit)  Weight Loss:  Unable to assess (due to CHF/ hemodialysis)     Body Fat Loss:  No significant body fat loss     Muscle Mass Loss:  No significant muscle mass loss    Fluid Accumulation:  Unable to assess     Strength:  Not Performed    Nutrition Assessment:      Pt. nutritionally compromised AEB primary nutrition source from nocturnal tube feeding via PEG prior to admit and wounds. At risk for further nutrition compromise r/t admit for preop antibiotics prior to nephrostomy tube exchange, hx obstructive staghorn calculus of left kidney, nephrostomy tube placement, patient with purulent drainage from left nephrostomy tube, hypotension, ALVIN on CKD stage III, hemodialysis patient, dysphagia, increased nutrient needs for wound healing, underlying medical condition (CHF, 1/27/22 diverting colostomy, depression, gout, OA).       Nutrition Related Findings:    Pt. Report/Treatments/Miscellaneous: Patient seen earlier today, reports tolerating nocturnal tube feed and only bites of intake at meals, reports only small po due to abdomen discomfort, reports no use of ONS at [facility] and aware of NPO today for nephrostomy tube exchange, spoke with Lucas MERA earlier today regarding plans to resume tube feeding and oral diet as patient receives at [facility] when appropriate after procedure  spoke with RN yesterday when called ECF regarding PO diet and tube feeding regimen at [facility]: Nepro at 55ml/ hour x 12 hours daily (6PM to 6AM) and 50ml water flush every shift, mechanical soft with thin liquids and 2000ml fluid restriction  GI Status: BM x 1 past 24 hours   Pertinent Labs: Sodium 134, Potassium 4, BUN 99, Creatinine 2.7, Glucose 79, Hgb 6.7  Pertinent Meds: folic acid, zosyn, IV fluid at 50ml hour    Wound Type:  (unstageable wounds to right and left posterior lower legs, right posterior heel, and left second toe; evolving DTPI to right hip and ischium; stage 4 sacrum)       Current Nutrition Intake & Therapies:          No diet orders on file  Tube Feeding (TF) Recommendation:  · Feeding Route: PEG  · Formula: Renal Formula  · Schedule: Cyclic  · Feeding Regimen: Nepro at 55ml/ hour x 12 per day (6PM to 6AM)  · Additives/Modulars: None  · Water Flushes: Recommend 30ml water flush before and 30ml after; additional free water flush as per Doctor  · Goal TF & Flush Orders Provides: 1168 kcals (~83-91% of estimated daily needs), 53 grams protein, 106 grams CHO, 16.5 grams fiber, 480ml water (540ml with flushes) in 660ml (720ml with flushes) per 24 hours      Anthropometric Measures:  Height: 4' 9\" (144.8 cm)  Ideal Body Weight (IBW): 85 lbs (39 kg)    Admission Body Weight: 141 lb 3.2 oz (64 kg) (7/11/22; no edema)  Current Body Weight: 141 lb 3.2 oz (64 kg), 166.1 % IBW. Current BMI (kg/m2): 30.5  Usual Body Weight:  (per EMR: 10/17/21 164#, 1/20/22 144# 13.5oz, 3/13/22 133# bedscale, 4/11/22 134#, 4/26/22 175# 11.3oz prior to dialysis; per ECF weights: 2/4/22 144#, 4/5/22 133#)     Weight Adjustment For:  (adjusted IBW for low stature: 93# (42kgm))                 BMI Categories: Obese Class 1 (BMI 30.0-34. 9)    Estimated Daily Nutrient Needs:  Energy Requirements Based On: Kcal/kg  Weight Used for Energy Requirements: Admission (64kgm on 7/11)  Energy (kcal/day): 3329-6264 kcals (20-22)  Weight Used for Protein Requirements: Ideal (42kgm)  Protein (g/day): 50-63 grams (1.2-1.5) pending renal HD/ wound status  Method Used for Fluid Requirements: Other (Comment)  Fluid (ml/day): per Nephrology    Nutrition Diagnosis:   · Inadequate oral intake related to inadequate protein-energy intake as evidenced by poor intake prior to admission,nutrition support - enteral nutrition      Nutrition Interventions:   Food and/or Nutrient Delivery:  (when able to resume diet, recommend nocturnal tube feeding and oral diet as receives at [facility])  Nutrition Education/Counseling:  (patient aware of being NPO for procedure)  Coordination of Nutrition Care: Continue to monitor while inpatient       Goals:     Goals: Meet at least 75% of estimated needs,by next RD assessment       Nutrition Monitoring and Evaluation:      Food/Nutrient Intake Outcomes: Food and Nutrient Intake,Enteral Nutrition Intake/Tolerance  Physical Signs/Symptoms Outcomes: Biochemical Data,Fluid Status or Edema,Meal Time Behavior,Nutrition Focused Physical Findings,Skin,Weight,GI Status    Discharge Planning:     Too soon to determine     Amy Willoughby RD, LD  Contact: [phone]

## 2022-07-12 NOTE — PROGRESS NOTES
Hospitalist Progress Note    Patient:  Stephy Barger    Unit/Bed:5K-19/019-A  YOB: 1950  MRN: [id number]   Acct: [de-identified]   PCP: Renato Amin MD  Code Status: Full Code  Date of Admission: 7/11/2022      Assessment/Plan:    1. Catheter associated UTI:  Patient admitted for preop antibiotics prior to next tube exchange. On Zosyn. Continue IVF. Urology is primary.       2. Sepsis secondary to #1: Pt hypotensive and tachycardiac, +leukocytosis, lactic acidosis. Pt treated with IVF. On Zosyn. LA wnl, wbx trending down. Blood cultures negative. 3. Hypotension: secondary to volume depletion. Resolved with 500cc bolus. Continue IVF, monitor closely. Nephrology following for assistance with fluid management given patient is on HD.      3. ALVIN, requiring HD: Patient started on HD in 04/2022. Nephrology following.      4. Acute on chronic normocytic anemia: anemia of chronic disease, on PARRIS as OP. Hgb 6.7 today, pt hemodynamically stable and no signs of acute bleeding. 1 unit PRBC ordered.       5. Dysphagia, malnutrition: h/o PEG tube, consult dietitian.      6. Chronic HFpEF without deompensaiton: TOBIAS 10/2021 EF 60%. On Bumex, will hold for now as patient appears dry. Add parameters to metoprolol.       7. Hx stage III decub ulcer s/p diverting colostomy 1/27/22: wound care consulted.      8. Anxiety: Resume home meds. Chief Complaint:  Neph tube exchange  Reason for consult: admitted for preop abx prior to neph rube exchange, hypotensive on arrival. Help with medical management. HPI / Hospital Course: Stephy Barger 73 y.o. female who we are asked to see/evaluate by Mathieu Rm MD for medical management of hypotension. Patient directly admitted by urology service for preop antibiotics prior to nephrostomy tube exchange. Patient is chronically ill, has pain for nutrition, diverting colostomy bag for stage III decubitus ulcer, nephrostomy tubes.  Patient has history of obstructive staghorn calculus of left kidney, was not a candidate for stone treatment and nephrostomy tube was placed. Patient with purulent drainage from left nephrostomy tube, concentrated yellow urine in Padilla bag. Patient has multiple large wounds, wound care is following. Patient seen with family at bedside. No acute complaints at this time. She denies fever/chills, chest pain, shortness of breath, abdominal pain, N/V/D. Denies pain at nephrostomy tube site. Hospitalist will continue to follow. Subjective (past 24 hours): Patient resting comfortably. Alert to verbal stimuli, nods yes/no appropriately to questions. Denies f/c, sob, cp, abd pain, nvd. Hgb 6.7 today, 1 unit PRBC ordered. No signs of bleeding. ROS: Pertinent positives as noted in HPI. All other systems reviewed and negative. Past medical history, family history, social history and allergies reviewed again and is unchanged since admission. Medications:  Reviewed.   Infusion Medications    sodium chloride      sodium chloride 50 mL/hr at 07/12/22 5304    sodium chloride       Scheduled Medications    [Held by provider] bumetanide  1 mg Oral Daily    famotidine  20 mg PEG Tube Daily    folic acid  1 mg Oral Daily    [Held by provider] metoprolol tartrate  25 mg Oral BID    sertraline  25 mg Oral Daily    sodium chloride flush  10 mL IntraCATHeter TID    sodium hypochlorite   Irrigation BID    sodium chloride flush  5-40 mL IntraVENous 2 times per day    piperacillin-tazobactam  2,250 mg IntraVENous Q8H    [Held by provider] heparin (porcine)  5,000 Units SubCUTAneous 3 times per day     PRN Meds: sodium chloride, acetaminophen, docusate sodium, ipratropium-albuterol, melatonin, sodium chloride flush, sodium chloride, ondansetron **OR** ondansetron, polyethylene glycol, traMADol    I/O:     Intake/Output Summary (Last 24 hours) at 7/12/2022 1047  Last data filed at 7/12/2022 0924  Gross per 24 hour   Intake 20 ml   Output 0 ml Net 20 ml       Diet:  No diet orders on file    Exam:  BP 95/61   Pulse 94   Temp 98 °F (36.7 °C) (Oral)   Resp 16   Wt 141 lb 3.2 oz (64 kg)   SpO2 94%   BMI 30.56 kg/m²   General:   Chronically ill appearing female. NAD. HEENT:  normocephalic and atraumatic. No scleral icterus. PERR. Neck: supple. No JVD. No thyromegaly. Lungs: clear to auscultation. No retractions  Cardiac: Irregular rate and rhythm without murmur. Abdomen: soft. Nontender. Bowel sounds positive. Ostomy in place. Extremities:  No clubbing, cyanosis, or edema x 4. Vasculature: capillary refill < 3 seconds. Palpable LE pulses bilaterally. Skin:  warm and dry. Psych:  Alert and oriented x3. Affect flat  Lymph:  No supraclavicular adenopathy. Neurologic:  No focal deficit. No seizures. Data: (All radiographs, tracings, PFTs, and imaging are personally viewed and interpreted unless otherwise noted)  Labs:   Recent Labs     07/11/22  1001 07/12/22  0545   WBC 20.4* 17.6*   HGB 7.0* 6.7*   HCT 24.5* 23.9*    291     Recent Labs     07/11/22  1001 07/12/22  0545   * 134*   K 4.0 4.0   CL 96* 100   CO2 20* 19*   * 99*   CREATININE 2.7* 2.7*   CALCIUM 8.7 8.5     No results for input(s): AST, ALT, BILIDIR, BILITOT, ALKPHOS in the last 72 hours. No results for input(s): INR in the last 72 hours. No results for input(s): Adonica Hoose in the last 72 hours.   Urinalysis:   Lab Results   Component Value Date/Time    NITRU NEGATIVE 04/12/2022 06:30 AM    WBCUA 50-75W/CLUMPS 04/12/2022 06:30 AM    BACTERIA FEW 04/12/2022 06:30 AM    RBCUA > 200 04/12/2022 06:30 AM    BLOODU LARGE 04/12/2022 06:30 AM    SPECGRAV 1.014 01/20/2022 09:45 PM    GLUCOSEU NEGATIVE 04/12/2022 06:30 AM     Urine culture:   Lab Results   Component Value Date/Time    LABURIN No growth-preliminary  01/20/2022 09:45 PM    LABURIN [place] count: 1,000 CFU/mL  01/20/2022 09:45 PM     Micro:   Blood culture #1:   Lab Results   Component Value Date/Time    BC No growth-preliminary  07/11/2022 02:23 PM    BC No growth-preliminary  07/11/2022 02:23 PM     Blood culture #2:No results found for: Runell Clutter  Organism:  Lab Results   Component Value Date/Time    ORG gram positive bacilli 05/17/2022 11:43 AM         Lab Results   Component Value Date/Time    LABGRAM  02/22/2022 01:14 PM     Many segmented neutrophils observed. No epithelial cells observed. No bacteria seen. MRSA culture only:No results found for: [facility]  Respiratory culture: No results found for: CULTRESP  Aerobic and Anaerobic :  Lab Results   Component Value Date/Time    LABAERO  05/17/2022 11:43 AM     [place] count: 10,000-50,000 CFU/mL gram positive bacilli consistent with Corynebacterium species. Clinical correlation required. If isolate is clinically significant, empiric therapy is indicated. Lab Results   Component Value Date/Time    LABANAE  05/17/2022 11:43 AM     No anaerobes isolated- preliminary Culture yielded <10,000 cfu/ml each of anaerobic gram negative bacilli, and multiple colony types of anaerobic gram positive cocci. If a true mixed aerobic and anaerobic infection is suspected, then broad spectrum empiric antibiotic therapy is indicated and should include coverage for anaerobic organisms. Radiology Reports:  IR INTERVENTIONAL RADIOLOGY PROCEDURE REQUEST    (Results Pending)     No results found.           Active Hospital Problems    Diagnosis Date Noted    Staghorn calculus [N20.0] 07/11/2022     Priority: Medium    Urinary tract infection associated with nephrostomy catheter ([facility] [address] [V03.499M, N39.0]      Priority: Medium    Hyponatremia [E87.1]      Priority: Medium       Electronically signed by Claudetta Piano, PA-C on 7/12/2022 at 10:47 AM

## 2022-07-12 NOTE — PROGRESS NOTES
Urology Progress Note    Chief Complaint: neph tube exchange    Subjective: \"    Patient is resting in bed, voiding yellow urine with sediment in seaman, neph draining milky colored drainage, denies any nausea or vomiting. There are complaints of no pain at this time. HGB 6.7 this AM, order to transfuse managed by medicine  IR planning on exchange later today        Vitals:  BP 95/61   Pulse 94   Temp 98 °F (36.7 °C) (Oral)   Resp 16   Wt 141 lb 3.2 oz (64 kg)   SpO2 94%   BMI 30.56 kg/m²   Temp  Av.5 °F (36.9 °C)  Min: 98 °F (36.7 °C)  Max: 98.7 °F (37.1 °C)    Intake/Output Summary (Last 24 hours) at 2022 1144  Last data filed at 2022 8068  Gross per 24 hour   Intake 20 ml   Output 0 ml   Net 20 ml       Social History     Socioeconomic History    Marital status: Single     Spouse name: Not on file    Number of children: 0    Years of education: Not on file    Highest education level: Not on file   Occupational History    Not on file   Tobacco Use    Smoking status: Never Smoker    Smokeless tobacco: Never Used   Vaping Use    Vaping Use: Never used   Substance and Sexual Activity    Alcohol use: No    Drug use: No    Sexual activity: Not Currently   Other Topics Concern    Not on file   Social History Narrative    Not on file     Social Determinants of Health     Financial Resource Strain: Low Risk     Difficulty of Paying Living Expenses: Not very hard   Food Insecurity: No Food Insecurity    Worried About Running Out of Food in the Last Year: Never true    Traci of Food in the Last Year: Never true   Transportation Needs:     Lack of Transportation (Medical): Not on file    Lack of Transportation (Non-Medical):  Not on file   Physical Activity:     Days of Exercise per Week: Not on file    Minutes of Exercise per Session: Not on file   Stress:     Feeling of Stress : Not on file   Social Connections:     Frequency of Communication with Friends and Family: Not on file    Frequency of Social Gatherings with Friends and Family: Not on file    Attends Anabaptist Services: Not on file    Active Member of Clubs or Organizations: Not on file    Attends Club or Organization Meetings: Not on file    Marital Status: Not on file   Intimate Partner Violence:     Fear of Current or Ex-Partner: Not on file    Emotionally Abused: Not on file    Physically Abused: Not on file    Sexually Abused: Not on file   Housing Stability:     Unable to Pay for Housing in the Last Year: Not on file    Number of Places Lived in the Last Year: Not on file    Unstable Housing in the Last Year: Not on file     Family History   Problem Relation Age of Onset    Diabetes Father     Arthritis Mother     COPD Mother     Diabetes Sister     Heart Disease Maternal Uncle     Breast Cancer Niece 36    Sleep Apnea Brother     Asthma Neg Hx     Birth Defects Neg Hx     Cancer Neg Hx     Depression Neg Hx     Early Death Neg Hx     Hearing Loss Neg Hx     High Blood Pressure Neg Hx     High Cholesterol Neg Hx     Kidney Disease Neg Hx     Learning Disabilities Neg Hx     Mental Illness Neg Hx     Mental Retardation Neg Hx     Miscarriages / Stillbirths Neg Hx     Stroke Neg Hx     Substance Abuse Neg Hx     Vision Loss Neg Hx     Other Neg Hx      No Known Allergies      Constitutional:               Alert and oriented times 3, no acute distress and cooperative to examination with appropriate mood and affect. HEENT:   Head:               Normocephalic and atraumatic. Mouth/Throat:                Mucous membranes are normal.   Eyes:               EOM are normal. No scleral icterus. Nose:               The external appearance of the nose is normal  Ears: The ears appear normal to external inspection   Neck:               Supple, symmetrical, trachea midline, no adenopathy, thyroid symmetric, not enlarged and no tenderness.    Cardiovascular:               Normal rate, regular rhythm, S1 S2 heart sounds. Pulmonary/Chest:              Chest symmetric with normal A/P diameter, no wheezes, rales, or rhonchi noted. Normal respiratory rate and rhthym. No use of accessory muscles. Abdominal:               Soft. No tenderness. Left neph tube draining milky colored drainage in tube and saturating dressing around. Peg tube    Genitalia:               Padilla catheter draining yellow concentrated urine with sediment  Musculoskeletal:               Normal range of motion. She exhibits no edema or tenderness of lower extremities. Large sacral wound. Extremities:               Toe ulcerations  Neurological:               Alert and oriented. No cranial nerve deficit. There are no focalizing motor or sensory deficits. Labs:  WBC:    Lab Results   Component Value Date/Time    WBC 17.6 07/12/2022 05:45 AM     Hemoglobin/Hematocrit:    Lab Results   Component Value Date/Time    HGB 6.7 07/12/2022 05:45 AM    HCT 23.9 07/12/2022 05:45 AM     BMP:    Lab Results   Component Value Date/Time     07/12/2022 05:45 AM    K 4.0 07/12/2022 05:45 AM    K 4.1 03/14/2022 04:44 AM     07/12/2022 05:45 AM    CO2 19 07/12/2022 05:45 AM    BUN 99 07/12/2022 05:45 AM    LABALBU 1.9 04/16/2022 05:38 AM    CREATININE 2.7 07/12/2022 05:45 AM    CALCIUM 8.5 07/12/2022 05:45 AM    LABGLOM 17 07/12/2022 05:45 AM           Impression/Plan:  Plan on IR nephrostomy exchange later today  Medicine on board - appreciate recs    Anemia - HGB 6.7, plan to transfuse - medicine managing  Admitted for preop abx prior to neph tube exchange - will start Zosyn, pharmacy to dose  Sacral wounds - evaluated by wound care 6/30.   Will have wound care evaluate while here  Hypotension - resolved with bolus    LANE Fry - CNP, APRN  07/12/22 11:44 AM  Urology

## 2022-07-12 NOTE — PLAN OF CARE
Problem: Chronic Conditions and Co-morbidities  Goal: Patient's chronic conditions and co-morbidity symptoms are monitored and maintained or improved  Outcome: Progressing  Flowsheets (Taken 7/12/2022 4844)  Care Plan - Patient's Chronic Conditions and Co-Morbidity Symptoms are Monitored and Maintained or Improved: Monitor and assess patient's chronic conditions and comorbid symptoms for stability, deterioration, or improvement     Problem: Safety - Adult  Goal: Free from fall injury  Outcome: Progressing  Flowsheets (Taken 7/12/2022 1232)  Free From Fall Injury: Instruct family/caregiver on patient safety     Problem: ABCDS Injury Assessment  Goal: Absence of physical injury  Outcome: Progressing  Flowsheets (Taken 7/12/2022 1232)  Absence of Physical Injury: Implement safety measures based on patient assessment     Problem: Skin/Tissue Integrity  Goal: Absence of new skin breakdown  Description: 1.   Monitor for areas of redness and/or skin breakdown  Outcome: Progressing     Problem: Discharge Planning  Goal: Discharge to home or other facility with appropriate resources  7/12/2022 1456 by Megan Godinez RN  Outcome: Progressing  Flowsheets (Taken 7/12/2022 7950)  Discharge to home or other facility with appropriate resources: Identify barriers to discharge with patient and caregiver     Problem: Nutrition Deficit:  Goal: Optimize nutritional status  Outcome: Progressing

## 2022-07-12 NOTE — DISCHARGE INSTR - COC
COVID-19, PFIZER PURPLE top, DILUTE for use, (age 15 y+), 30mcg/0.3mL 02/11/2021, 03/04/2021    Influenza, MDCK Quadv, IM, PF (Flucelvax 2 yrs and older) 10/15/2020    Influenza, Quadv, IM, (6 mo and older Fluzone, Flulaval, Fluarix and 3 yrs and older Afluria) 10/11/2017    Influenza, Quadv, IM, PF (6 mo and older Fluzone, Flulaval, Fluarix, and 3 yrs and older Afluria) 09/17/2019    Pneumococcal Conjugate 13-valent ([facility]) 10/11/2017    Pneumococcal Polysaccharide (Ahyqaldkg40) 06/18/2019       Active Problems:  Patient Active Problem List   Diagnosis Code    Essential hypertension I10    Chronic depression F32. A    CHF (congestive heart failure) ([facility]) I50.9    Thrombocytopenia ([facility]) D69.6    Moderate episode of recurrent major depressive disorder ([facility]) F33.1    Uremia N19    Hyperkalemia E87.5    Isolated non-nephrotic proteinuria R80.0    ALVIN (acute kidney injury) ([facility] [address] N17.9    Chronic right-sided heart failure ([facility]) I50.812    Pulmonary HTN ([facility]) I27.20    Hypotension due to hypovolemia I95.89, E86.1    Acute renal failure superimposed on stage 4 chronic kidney disease (HCC) N17.9, N18.4    Pressure injury of sacral region, stage 4 ([facility]) L89.154    Acute respiratory failure ([facility]) J96.00    Flash pulmonary edema ([facility]) J81.0    Shock ([facility]) R57.9    Aspiration pneumonia of left lung ([facility]) J69.0    Pleural effusion J90    Paroxysmal atrial fibrillation ([facility]) I48.0    Hemoptysis R04.2    Chronic respiratory failure with hypoxia ([facility]) J96.11    Pneumothorax, right J93.9    Collapse of left lung J98.11    Abnormal chest x-ray R93.89    Acute on chronic respiratory failure with hypoxia ([facility]) J96.21    Retroperitoneal hematoma K66.1    HAP (hospital-acquired pneumonia) J18.9, Y95    Colostomy in place ([facility] [address] Z93.3    PEG (percutaneous endoscopic gastrostomy) status ([facility]) Z93.1    Intertriginous dermatitis associated with moisture L30.4    Peristomal dermatitis associated with moisture L30.8    Nephrostomy tube displaced [facility]) T83.022A    Open wound of second toe of right foot S91.104A    Open wound of second toe of left foot U56.388X    Metabolic acidosis B39.7    Irritation around percutaneous endoscopic gastrostomy (PEG) tube site [facility]) K94.29    Staghorn calculus N20.0    Urinary tract infection associated with nephrostomy catheter (HCC) T83.512A, N39.0    Hyponatremia E87.1       Isolation/Infection:   Isolation            Contact          Patient Infection Status       Infection Onset Added Last Indicated Last Indicated By Review Planned Expiration Resolved Resolved By    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 by PCR        PCR 3/2022    ESBL (Extended Spectrum Beta Lactamase) 01/19/22 01/22/22 01/19/22 Culture, Reflexed, Urine        Proteus urine 1/2022    Resolved    COVID-19 (Rule Out) 10/27/21 10/27/21 10/27/21 COVID-19, Rapid (Ordered)   10/27/21 Rule-Out Test Resulted            Nurse Assessment:  Last Vital Signs: BP (!) 96/54   Pulse 90   Temp 98.6 °F (37 °C) (Oral)   Resp 16   Wt 141 lb 3.2 oz (64 kg)   SpO2 94%   BMI 30.56 kg/m²     Last documented pain score (0-10 scale): Pain Level: 5  Last Weight:   Wt Readings from Last 1 Encounters:   07/11/22 141 lb 3.2 oz (64 kg)     Mental Status:  oriented and alert    IV Access:  - Dialysis Catheter  - site  right, insertion date: ***    Nursing Mobility/ADLs:  Walking   Dependent  Transfer  Dependent  Bathing  Dependent  Dressing  Dependent  Toileting  Dependent  Feeding  Dependent  Med Admin  Dependent  Med Delivery   crushed    Wound Care Documentation and Therapy:  Wound 08/24/21 Sacrum (Active)   Number of days: 321       Wound 03/13/22 Toe (Comment  which one) Anterior; Left left second toe (Active)   Number of days: 120       Wound 03/13/22 Toe (Comment  which one) Anterior;Right right second toe (Active)   Number of days: 120       Wound 04/28/22 Buttocks Left; Inner (Active)   Number of days: 75       Wound 06/30/22 Toe (Comment  which one) Left 3rd toe (Active)   Number of days: 11       Wound 06/30/22 Heel Left (Active)   Number of days: 11       Wound 06/30/22 Heel Right (Active)   Number of days: 11        Elimination:  Continence: Bowel: Yes  Bladder: Yes  Urinary Catheter: Insertion Date: 7/17    Colostomy/Ileostomy/Ileal Conduit: Yes       Date of Last BM: 7/21      Intake/Output Summary (Last 24 hours) at 7/12/2022 0903  Last data filed at 7/11/2022 2032  Gross per 24 hour   Intake 10 ml   Output 0 ml   Net 10 ml     I/O last 3 completed shifts: In: 10 [I.V.:10]  Out: 0     Safety Concerns:     Aspiration Risk    Impairments/Disabilities:      Vision    Nutrition Therapy:  Current Nutrition Therapy:   - Oral Nutrition Supplement:  None  - Tube Feedings:  Renal    Routes of Feeding: Gastrostomy Tube  Liquids: Nectar Thick Liquids  Daily Fluid Restriction: no  Last Modified Barium Swallow with Video (Video Swallowing Test): not done    Treatments at the Time of Hospital Discharge:   Respiratory Treatments: no  Oxygen Therapy:  is not on home oxygen therapy.   Ventilator:    - No ventilator support    Rehab Therapies: ***  Weight Bearing Status/Restrictions: No weight bearing restrictions  Other Medical Equipment (for information only, NOT a DME order):  wheelchair  Other Treatments: ***    Patient's personal belongings (please select all that are sent with patient):  Harish SANTACRUZ SIGNATURE:    CASE MANAGEMENT/SOCIAL WORK SECTION    Inpatient Status Date: 7/11/22    Readmission Risk Assessment Score:  Readmission Risk              Risk of Unplanned Readmission:  0           Discharging to Facility/ Agency   Name: [address] (if applicable)   Name:  Address:  Dialysis Schedule:  Phone:  Fax:    / signature: Electronically signed by OSIEL Coronel on 7/12/22 at 9:05 AM EDT    PHYSICIAN SECTION    Prognosis: Good    Condition at Discharge: Stable    Rehab Potential (if transferring to Rehab): Good    Recommended Labs or Other Treatments After Discharge: NA    Physician Certification: I certify the above information and transfer of Bindu Tovar  is necessary for the continuing treatment of the diagnosis listed and that she requires [facility] for less 30 days.      Update Admission H&P: No change in H&P    PHYSICIAN SIGNATURE:  Verbal order Dr. Abiodun Gray/ Sue Jimenez RN

## 2022-07-12 NOTE — CARE COORDINATION
7/12/22, 8:57 AM EDT  Discharge Planning Evaluation  Social work consult received, patient from [facility]. Patient/Family preference is to return to [place] [address]. The patient's current payor source at the facility is Medicaid. Medicare skilled days available: yes  Insurance precert:  no  Spoke with Lou at the facility. Patient bed hold: yes  Anticipated transport plan: ambulette  Do they require COVID 19 test to return to [facility]  Is there a required time frame which which COVID test needs done:within 48hrs  Patient's Healthcare Decision Maker: Named in [address] with pt this morning, confirms plans to return to [place] [address], pt denies wanting SW to contact family. Pt does get dialysis, when SW asking about this pt states she does not feel good and ask for SW to come back later. SW did talk with Rambo Rock at [place] [address], pt gets dialysis 2 days/week.

## 2022-07-13 ENCOUNTER — APPOINTMENT (OUTPATIENT)
Dept: INTERVENTIONAL RADIOLOGY/VASCULAR | Age: 72
DRG: 698 | End: 2022-07-13
Attending: UROLOGY
Payer: MEDICARE

## 2022-07-13 LAB
ANION GAP SERPL CALCULATED.3IONS-SCNC: 18 MEQ/L (ref 8–16)
BUN BLDV-MCNC: 92 MG/DL (ref 7–22)
CALCIUM SERPL-MCNC: 8.4 MG/DL (ref 8.5–10.5)
CHLORIDE BLD-SCNC: 102 MEQ/L (ref 98–111)
CO2: 16 MEQ/L (ref 23–33)
CREAT SERPL-MCNC: 3 MG/DL (ref 0.4–1.2)
ERYTHROCYTE [DISTWIDTH] IN BLOOD BY AUTOMATED COUNT: 18 % (ref 11.5–14.5)
ERYTHROCYTE [DISTWIDTH] IN BLOOD BY AUTOMATED COUNT: 63.5 FL (ref 35–45)
GFR SERPL CREATININE-BSD FRML MDRD: 15 ML/MIN/1.73M2
GLUCOSE BLD-MCNC: 77 MG/DL (ref 70–108)
GLUCOSE BLD-MCNC: 91 MG/DL (ref 70–108)
HCT VFR BLD CALC: 28 % (ref 37–47)
HCT VFR BLD CALC: 29.6 % (ref 37–47)
HEMOGLOBIN: 8.1 GM/DL (ref 12–16)
HEMOGLOBIN: 8.2 GM/DL (ref 12–16)
MCH RBC QN AUTO: 26.3 PG (ref 26–33)
MCHC RBC AUTO-ENTMCNC: 27.4 GM/DL (ref 32.2–35.5)
MCV RBC AUTO: 96.1 FL (ref 81–99)
PLATELET # BLD: 310 THOU/MM3 (ref 130–400)
PMV BLD AUTO: 8.6 FL (ref 9.4–12.4)
POTASSIUM SERPL-SCNC: 4 MEQ/L (ref 3.5–5.2)
RBC # BLD: 3.08 MILL/MM3 (ref 4.2–5.4)
SODIUM BLD-SCNC: 136 MEQ/L (ref 135–145)
WBC # BLD: 20.9 THOU/MM3 (ref 4.8–10.8)

## 2022-07-13 PROCEDURE — 6360000002 HC RX W HCPCS: Performed by: UROLOGY

## 2022-07-13 PROCEDURE — 85014 HEMATOCRIT: CPT

## 2022-07-13 PROCEDURE — 80048 BASIC METABOLIC PNL TOTAL CA: CPT

## 2022-07-13 PROCEDURE — 6360000002 HC RX W HCPCS: Performed by: RADIOLOGY

## 2022-07-13 PROCEDURE — 6360000004 HC RX CONTRAST MEDICATION: Performed by: RADIOLOGY

## 2022-07-13 PROCEDURE — 2580000003 HC RX 258: Performed by: UROLOGY

## 2022-07-13 PROCEDURE — 99225 PR SBSQ OBSERVATION CARE/DAY 25 MINUTES: CPT

## 2022-07-13 PROCEDURE — 6370000000 HC RX 637 (ALT 250 FOR IP): Performed by: UROLOGY

## 2022-07-13 PROCEDURE — 99225 PR SBSQ OBSERVATION CARE/DAY 25 MINUTES: CPT | Performed by: UROLOGY

## 2022-07-13 PROCEDURE — C1729 CATH, DRAINAGE: HCPCS

## 2022-07-13 PROCEDURE — 82948 REAGENT STRIP/BLOOD GLUCOSE: CPT

## 2022-07-13 PROCEDURE — 36415 COLL VENOUS BLD VENIPUNCTURE: CPT

## 2022-07-13 PROCEDURE — 0T25X0Z CHANGE DRAINAGE DEVICE IN KIDNEY, EXTERNAL APPROACH: ICD-10-PCS | Performed by: RADIOLOGY

## 2022-07-13 PROCEDURE — 85018 HEMOGLOBIN: CPT

## 2022-07-13 PROCEDURE — 50435 EXCHANGE NEPHROSTOMY CATH: CPT

## 2022-07-13 PROCEDURE — 96361 HYDRATE IV INFUSION ADD-ON: CPT

## 2022-07-13 PROCEDURE — 96376 TX/PRO/DX INJ SAME DRUG ADON: CPT

## 2022-07-13 PROCEDURE — 85027 COMPLETE CBC AUTOMATED: CPT

## 2022-07-13 PROCEDURE — 96375 TX/PRO/DX INJ NEW DRUG ADDON: CPT

## 2022-07-13 PROCEDURE — G0378 HOSPITAL OBSERVATION PER HR: HCPCS

## 2022-07-13 PROCEDURE — 99232 SBSQ HOSP IP/OBS MODERATE 35: CPT | Performed by: INTERNAL MEDICINE

## 2022-07-13 PROCEDURE — 5A1D70Z PERFORMANCE OF URINARY FILTRATION, INTERMITTENT, LESS THAN 6 HOURS PER DAY: ICD-10-PCS | Performed by: INTERNAL MEDICINE

## 2022-07-13 RX ORDER — SODIUM CHLORIDE 450 MG/100ML
INJECTION, SOLUTION INTRAVENOUS CONTINUOUS
Status: DISCONTINUED | OUTPATIENT
Start: 2022-07-13 | End: 2022-07-13

## 2022-07-13 RX ORDER — MIDAZOLAM HYDROCHLORIDE 1 MG/ML
1 INJECTION INTRAMUSCULAR; INTRAVENOUS ONCE
Status: COMPLETED | OUTPATIENT
Start: 2022-07-13 | End: 2022-07-13

## 2022-07-13 RX ORDER — FENTANYL CITRATE 50 UG/ML
50 INJECTION, SOLUTION INTRAMUSCULAR; INTRAVENOUS ONCE
Status: DISCONTINUED | OUTPATIENT
Start: 2022-07-13 | End: 2022-07-13 | Stop reason: CLARIF

## 2022-07-13 RX ADMIN — SODIUM CHLORIDE: 9 INJECTION, SOLUTION INTRAVENOUS at 02:35

## 2022-07-13 RX ADMIN — PIPERACILLIN AND TAZOBACTAM 2250 MG: 2; .25 INJECTION, POWDER, LYOPHILIZED, FOR SOLUTION INTRAVENOUS at 18:12

## 2022-07-13 RX ADMIN — DAKIN'S SOLUTION 0.125% (QUARTER STRENGTH): 0.12 SOLUTION at 23:23

## 2022-07-13 RX ADMIN — PIPERACILLIN AND TAZOBACTAM 2250 MG: 2; .25 INJECTION, POWDER, LYOPHILIZED, FOR SOLUTION INTRAVENOUS at 10:11

## 2022-07-13 RX ADMIN — IOTHALAMATE MEGLUMINE 8 ML: 600 INJECTION INTRAVASCULAR at 08:25

## 2022-07-13 RX ADMIN — HYDROMORPHONE HYDROCHLORIDE 0.5 MG: 1 INJECTION, SOLUTION INTRAMUSCULAR; INTRAVENOUS; SUBCUTANEOUS at 08:19

## 2022-07-13 RX ADMIN — SODIUM CHLORIDE, PRESERVATIVE FREE 10 ML: 5 INJECTION INTRAVENOUS at 13:33

## 2022-07-13 RX ADMIN — SODIUM CHLORIDE, PRESERVATIVE FREE 10 ML: 5 INJECTION INTRAVENOUS at 23:00

## 2022-07-13 RX ADMIN — TRAMADOL HYDROCHLORIDE 50 MG: 50 TABLET, COATED ORAL at 15:25

## 2022-07-13 RX ADMIN — PIPERACILLIN AND TAZOBACTAM 2250 MG: 2; .25 INJECTION, POWDER, LYOPHILIZED, FOR SOLUTION INTRAVENOUS at 02:40

## 2022-07-13 RX ADMIN — SODIUM CHLORIDE, PRESERVATIVE FREE 10 ML: 5 INJECTION INTRAVENOUS at 09:00

## 2022-07-13 RX ADMIN — MIDAZOLAM HYDROCHLORIDE 0.5 MG: 1 INJECTION, SOLUTION INTRAMUSCULAR; INTRAVENOUS at 08:19

## 2022-07-13 RX ADMIN — DAKIN'S SOLUTION 0.125% (QUARTER STRENGTH): 0.12 SOLUTION at 15:50

## 2022-07-13 RX ADMIN — SODIUM CHLORIDE, PRESERVATIVE FREE 10 ML: 5 INJECTION INTRAVENOUS at 22:59

## 2022-07-13 ASSESSMENT — PAIN DESCRIPTION - DESCRIPTORS: DESCRIPTORS: SHARP

## 2022-07-13 ASSESSMENT — PAIN DESCRIPTION - LOCATION: LOCATION: BUTTOCKS

## 2022-07-13 ASSESSMENT — PAIN SCALES - GENERAL
PAINLEVEL_OUTOF10: 10
PAINLEVEL_OUTOF10: 0
PAINLEVEL_OUTOF10: 10
PAINLEVEL_OUTOF10: 0

## 2022-07-13 ASSESSMENT — PAIN DESCRIPTION - ORIENTATION: ORIENTATION: LOWER

## 2022-07-13 ASSESSMENT — PAIN - FUNCTIONAL ASSESSMENT: PAIN_FUNCTIONAL_ASSESSMENT: ACTIVITIES ARE NOT PREVENTED

## 2022-07-13 NOTE — OP NOTE
Department of Radiology  Post Procedure Progress Note      Pre-Procedure Diagnosis:  Staghorn calculi    Procedure Performed:  Nephrostomy tube exchange    Anesthesia: local / versed and fentanyl    Findings: successful    Immediate Complications:  None    Estimated Blood Loss: minimal    SEE DICTATED PROCEDURE NOTE FOR COMPLETE DETAILS.     Juan Carlos Hendricks MD   7/13/2022 8:36 AM

## 2022-07-13 NOTE — PROGRESS NOTES
Hospitalist Progress Note      Patient:  Clifford Ribeiro    Unit/Bed:5K-19/019-A  YOB: 1950  MRN: [id number]   Acct: [de-identified]   PCP: Molly Lucas MD  Date of Admission: 7/11/2022    Assessment/Plan:    1. Catheter associated UTI:   ·  Patient admitted for preop antibiotics prior to next tube exchange. On Zosyn.  Urology is primary. 2. Sepsis secondary to #1:   · Pt hypotensive and tachycardiac, +leukocytosis, lactic acidosis on 07/12. Pt treated with IVF. On Zosyn IR to neph exchange. · Lactic trended down to WNL. Blood cultures negative to date. We will continue to monitor closely.     3. Hypotension:   · Secondary to volume depletion. Improved with 500cc bolus on 07/12. · Nephrology following, discontinued IV fluids today, BP better controlled this afternoon. Will monitor closely. Patient's home metoprolol currently held, she received 1 dose of Versed with notable improvement. 3. LAVIN, requiring HD:   · Patient started on HD in 04/2022. Nephrology following. 4. Acute on chronic normocytic anemia:   · Anemia of chronic disease, on PARRIS as OP. Hgb 6.7 07/12, pt hemodynamically stable and no signs of acute bleeding. Pt received 1 U PRBC. · Hemoglobin appears to have stabilized, 8.1 today. We will continue to trend. No signs of bleeding on examination. 5. Dysphagia, malnutrition:  ·  h/o PEG tube, dietitian consulted  6. Chronic HFpEF without deompensaiton:   · TOBIAS 10/2021 EF 60%. Currently on hold as patient appears dry and BP remains soft. Metoprolol also held per primary. .    7. Hx stage III decub ulcer s/p diverting colostomy 1/27/22:   · wound care consulted   8. Anxiety:   · Continue home meds.       Chief Complaint: Neck tube exchange    Initial H and P:-    Chart review: \"Kenzie Diop 71 y.o. female who we are asked to see/evaluate by Bessy Silva MD for medical management of hypotension.  Patient directly admitted by urology service for preop antibiotics prior to nephrostomy tube exchange.  Patient is chronically ill, has pain for nutrition, diverting colostomy bag for stage III decubitus ulcer, nephrostomy tubes. Patient has history of obstructive staghorn calculus of left kidney, was not a candidate for stone treatment and nephrostomy tube was placed.  Patient with purulent drainage from left nephrostomy tube, concentrated yellow urine in Padilla bag.  Patient has multiple large wounds, wound care is following.  Patient seen with family at bedside.  No acute complaints at this time.  She denies fever/chills, chest pain, shortness of breath, abdominal pain, N/V/D.  Denies pain at nephrostomy tube site.  Hospitalist will continue to follow.      7/12: Patient resting comfortably. Alert to verbal stimuli, nods yes/no appropriately to questions. Denies f/c, sob, cp, abd pain, nvd. Hgb 6.7 today, 1 unit PRBC ordered. No signs of bleeding. Subjective (past 24 hours):   Patient resting in bed s/p neph tube exchange. Patient appears to be doing well, voiding yellow urine. Patient denies abdominal pain, nausea, vomiting, chest pain, shortness of breath. Patient's BP remains soft,      Past medical history, family history, social history and allergies reviewed again and is unchanged since admission. ROS (All review of systems completed. Pertinent positives noted.  Otherwise All other systems reviewed and negative.)     Medications:  Reviewed    Infusion Medications    sodium chloride      sodium chloride      sodium chloride 50 mL/hr at 07/13/22 0235    sodium chloride       Scheduled Medications    [Held by provider] bumetanide  1 mg Oral Daily    famotidine  20 mg PEG Tube Daily    folic acid  1 mg Oral Daily    [Held by provider] metoprolol tartrate  25 mg Oral BID    sertraline  25 mg Oral Daily    sodium chloride flush  10 mL IntraCATHeter TID    sodium hypochlorite   Irrigation BID    sodium chloride flush 5-40 mL IntraVENous 2 times per day    piperacillin-tazobactam  2,250 mg IntraVENous Q8H    [Held by provider] heparin (porcine)  5,000 Units SubCUTAneous 3 times per day     PRN Meds: sodium chloride, acetaminophen, docusate sodium, ipratropium-albuterol, melatonin, sodium chloride flush, sodium chloride, ondansetron **OR** ondansetron, polyethylene glycol, traMADol      Intake/Output Summary (Last 24 hours) at 7/13/2022 1652  Last data filed at 7/13/2022 1557  Gross per 24 hour   Intake 530 ml   Output 1850 ml   Net -1320 ml       Diet:  ADULT DIET; Easy to Chew    Exam:  BP (!) 108/55   Pulse (!) 116   Temp 99 °F (37.2 °C) (Oral)   Resp 16   Ht 4' 9\" (1.448 m)   Wt 141 lb 3.2 oz (64 kg)   SpO2 92%   BMI 30.56 kg/m²   General appearance: Chronically ill appearing. No apparent distress,   HEENT: Normal cephalic, atraumatic without obvious deformity. Pupils equal, round, and reactive to light. Extra ocular muscles intact. Conjunctivae/corneas clear. Neck: Supple, with full range of motion. No jugular venous distention. Trachea midline. Respiratory:  Normal respiratory effort. Clear to auscultation, bilaterally without Rales/Wheezes/Rhonchi. Cardiovascular: Regular rate and rhythm with normal S1/S2 without murmurs, rubs or gallops. Abdomen: Soft, non-tender, non-distended with normal bowel sounds. Ostomy noted. Musculoskeletal:  No clubbing, cyanosis or edema bilaterally. Skin: Skin color, texture, turgor normal.  No rashes or lesions. Neurologic:  Neurovascularly intact without any focal sensory/motor deficits.  Cranial nerves: II-XII intact, grossly non-focal.  Psychiatric: Alert and oriented, thought content appropriate, normal insight  Capillary Refill: Brisk,< 3 seconds   Peripheral Pulses: +2 palpable, equal bilaterally       Labs:   Recent Labs     07/11/22  1001 07/11/22  1001 07/12/22  0545 07/12/22  0545 07/12/22  1124 07/13/22  0041 07/13/22  0531   WBC 20.4*  --  17.6*  --   --   -- 20.9*   HGB 7.0*   < > 6.7*   < > 6.6* 8.2* 8.1*   HCT 24.5*   < > 23.9*   < > 23.1* 28.0* 29.6*     --  291  --   --   --  310    < > = values in this interval not displayed. Recent Labs     07/11/22  1001 07/12/22  0545 07/13/22  0531   * 134* 136   K 4.0 4.0 4.0   CL 96* 100 102   CO2 20* 19* 16*   * 99* 92*   CREATININE 2.7* 2.7* 3.0*   CALCIUM 8.7 8.5 8.4*     No results for input(s): AST, ALT, BILIDIR, BILITOT, ALKPHOS in the last 72 hours. No results for input(s): INR in the last 72 hours. No results for input(s): Zachary Shorts in the last 72 hours. Microbiology:    Blood culture #1:   Lab Results   Component Value Date/Time    BC No growth-preliminary  07/11/2022 02:23 PM    BC No growth-preliminary  07/11/2022 02:23 PM       Blood culture #2:No results found for: Leticia Mittal    Organism:  Lab Results   Component Value Date/Time    ORG gram positive bacilli 05/17/2022 11:43 AM         Lab Results   Component Value Date/Time    LABGRAM  02/22/2022 01:14 PM     Many segmented neutrophils observed. No epithelial cells observed. No bacteria seen. MRSA culture only:No results found for: [facility]    Urine culture:   Lab Results   Component Value Date/Time    LABURIN No growth-preliminary  01/20/2022 09:45 PM    LABURIN [place] count: 1,000 CFU/mL  01/20/2022 09:45 PM       Respiratory culture: No results found for: CULTRESP    Aerobic and Anaerobic :  Lab Results   Component Value Date/Time    LABAERO  05/17/2022 11:43 AM     [place] count: 10,000-50,000 CFU/mL gram positive bacilli consistent with Corynebacterium species. Clinical correlation required. If isolate is clinically significant, empiric therapy is indicated. Lab Results   Component Value Date/Time    LABANAE  05/17/2022 11:43 AM     No anaerobes isolated- preliminary Culture yielded <10,000 cfu/ml each of anaerobic gram negative bacilli, and multiple colony types of anaerobic gram positive cocci.  If a true mixed aerobic and anaerobic infection is suspected, then broad spectrum empiric antibiotic therapy is indicated and should include coverage for anaerobic organisms. Urinalysis:      Lab Results   Component Value Date/Time    NITRU NEGATIVE 04/12/2022 06:30 AM    WBCUA 50-75W/CLUMPS 04/12/2022 06:30 AM    BACTERIA FEW 04/12/2022 06:30 AM    RBCUA > 200 04/12/2022 06:30 AM    BLOODU LARGE 04/12/2022 06:30 AM    SPECGRAV 1.014 01/20/2022 09:45 PM    GLUCOSEU NEGATIVE 04/12/2022 06:30 AM       Radiology:  IR GUIDED NEPHROSTOMY CATH EXCHANGE   Final Result   1. Status post successful nephrostomy tube exchange. 2. Patient will be tentatively scheduled for a repeat routine nephrostomy tube exchange in the next 3-4 months. **This report has been created using voice recognition software. It may contain minor errors which are inherent in voice recognition technology. **      Final report electronically signed by Dr. Brianna Arredondo on 7/13/2022 9:06 AM        No results found.     Electronically signed by Ronald Blanco PA-C on 7/13/2022 at 4:52 PM

## 2022-07-13 NOTE — H&P
[address] [address]  Sedation/Analgesia History & Physical    Pt Name: Stephy Barger  MRN: [id number]  YOB: 1950  Provider Performing Procedure: Rafal Schmidt MD, MD  Primary Care Physician: Renato Amin MD    PRE-PROCEDURE   DNR-CCA/DNR-CC []Yes [x]No   Brief History/Pre-Procedure Diagnosis: staghorn calculi          MEDICAL HISTORY  []CAD/Valve  []Liver Disease  []Lung Disease []Diabetes  []Hypertension []Renal Disease  []Additional information:       has a past medical history of CHF (congestive heart failure) ([facility] [address] Depression, Gout attack, Hemodialysis patient ([facility] [address] Hypertension, Osteoarthritis, Pulmonary artery hypertension ([facility] [address] Renal calculi, and Thrombocytopenia ([facility] [address] SURGICAL HISTORY   has a past surgical history that includes Appendectomy (1960); Facial nerve surgery; Pressure ulcer debridement (Right, 8/6/2021); IR GUIDED NEPHROSTOMY CATH PLACEMENT LEFT (11/1/2021); bronchoscopy (N/A, 11/22/2021); IR CHEST TUBE INSERTION (11/26/2021); Gastrostomy tube placement (N/A, 11/24/2021); bronchoscopy (N/A, 11/30/2021); colectomy (N/A, 1/27/2022); and IR GUIDED NEPHROSTOMY CATH PLACEMENT LEFT (3/14/2022).   Additional information:       ALLERGIES   Allergies as of 07/11/2022    (No Known Allergies)     Additional information:       MEDICATIONS   Coumadin Use Last 5 Days [x]No []Yes  Antiplatelet drug therapy use last 5 days  [x]No []Yes  Other anticoagulant use last 5 days  []No []Yes    Current Facility-Administered Medications:     0.45 % sodium chloride infusion, , IntraVENous, Continuous, Kolton Marie MD    0.9 % sodium chloride infusion, , IntraVENous, PRN, Regina Bates PA-C    acetaminophen (TYLENOL) tablet 650 mg, 650 mg, Oral, Q8H PRN, LANE Head - CNP    [Held by provider] bumetanide (BUMEX) tablet 1 mg, 1 mg, Oral, Daily, LANE Head - CNP    docusate sodium (COLACE) capsule 100 mg, 100 mg, Oral, Daily PRN, LANE Lara - CNP   famotidine (PEPCID) tablet 20 mg, 20 mg, PEG Tube, Daily, LANE Head CNP    folic acid (FOLVITE) tablet 1 mg, 1 mg, Oral, Daily, LANE Head CNP    ipratropium-albuterol (DUONEB) nebulizer solution 3 mL, 3 mL, Inhalation, Q4H PRN, LANE Head CNP    melatonin tablet 6 mg, 6 mg, Oral, Nightly PRN, Torrence Gottron, APRN - CNP    [Held by provider] metoprolol tartrate (LOPRESSOR) tablet 25 mg, 25 mg, Oral, BID, Ana Bates PA-C    sertraline (ZOLOFT) tablet 25 mg, 25 mg, Oral, Daily, LANE Head CNP    sodium chloride flush 0.9 % injection 10 mL, 10 mL, IntraCATHeter, TID, LANE Head CNP, 10 mL at 07/12/22 2255    sodium hypochlorite (DAKINS) 0.125 % external solution, , Irrigation, BID, Torrence Gottron, APRN - CNP, Given at 07/11/22 2031    0.9 % sodium chloride infusion, , IntraVENous, Continuous, LANE Head CNP, Last Rate: 50 mL/hr at 07/13/22 0235, New Bag at 07/13/22 0235    sodium chloride flush 0.9 % injection 5-40 mL, 5-40 mL, IntraVENous, 2 times per day, Torrence Gottron, APRN - CNP, 20 mL at 07/12/22 2254    sodium chloride flush 0.9 % injection 5-40 mL, 5-40 mL, IntraVENous, PRN, LANE Head - CNP    0.9 % sodium chloride infusion, , IntraVENous, PRN, Torrence Gottron, APRN - CNP    ondansetron (ZOFRAN-ODT) disintegrating tablet 4 mg, 4 mg, Oral, Q8H PRN **OR** ondansetron (ZOFRAN) injection 4 mg, 4 mg, IntraVENous, Q6H PRN, Torrence Gottron, APRN - CNP    polyethylene glycol (GLYCOLAX) packet 17 g, 17 g, Oral, Daily PRN, Torrence Gottron, APRN - CNP    piperacillin-tazobactam (ZOSYN) 2,250 mg in dextrose 5 % 50 mL IVPB (mini-bag), 2,250 mg, IntraVENous, Q8H, LANE Head CNP, Stopped at 07/13/22 0310    [Held by provider] heparin (porcine) injection 5,000 Units, 5,000 Units, SubCUTAneous, 3 times per day, Torrence Gottron, APRN - CNP    traMADol (ULTRAM) tablet 50 mg, 50 mg, Oral, BID PRN, Torrence Gottron, APRN - CNP, 50 mg at 07/11/22 1818  Prior to Admission medications    Medication Sig Start Date End Date Taking? Authorizing Provider   buPROPion (WELLBUTRIN) 75 MG tablet Take 75 mg by mouth daily   Yes Historical Provider, MD   traMADol (ULTRAM) 50 MG tablet Take 50 mg by mouth 2 times daily as needed for Pain (moderate to severe pain). Yes Historical Provider, MD   sertraline (ZOLOFT) 50 MG tablet Take 25 mg by mouth daily     Historical Provider, MD   bumetanide (BUMEX) 1 MG tablet Take 2 mg by mouth daily     Historical Provider, MD   metoprolol tartrate (LOPRESSOR) 25 MG tablet Take 1 tablet by mouth 2 times daily 4/26/22   Sandra Nicole, DO   ipratropium-albuterol (DUONEB) 0.5-2.5 (3) MG/3ML SOLN nebulizer solution Inhale 3 mLs into the lungs every 4 hours as needed for Shortness of Breath 4/26/22   Sandra Nicole, DO   sodium chloride 0.9 % SOLN Inject as directed Flush TID nephrostomy tube 10ml. Historical Provider, MD   miconazole (MICOTIN) 2 % powder Apply topically daily as needed for Itching Apply topically 2 times daily. Historical Provider, MD   silver nitrate applicators 46-25 % applicator Apply 1 each topically Once a week on Friday and PRN to prowed flesh at peg site    Historical Provider, MD   folic acid (FOLVITE) 1 MG tablet Take 1 tablet by mouth daily 2/4/22   Cherelle Jensen, DO   melatonin 3 MG TABS tablet Take 2 tablets by mouth nightly as needed (anxiety, sleep) 2/4/22   Susan Wright,    famotidine (PEPCID) 20 MG tablet 20 mg by PEG Tube route daily    Historical Provider, MD   senna (SENOKOT) 8.8 MG/5ML SYRP syrup Take 5 mLs by mouth nightly as needed  Patient not taking: Reported on 7/11/2022    Historical Provider, MD   sodium hypochlorite (DAKINS) 0.125 % SOLN external solution Apply Dakin's moistened gauze dressings to wound twice daily and as needed.  8/24/21   LANE Bourne - CNP   Multiple Vitamins-Minerals (MULTIVITAMIN WOMEN PO) Take 1 tablet by mouth daily    Historical Provider, MD acetaminophen (TYLENOL) 650 MG extended release tablet Take 650 mg by mouth every 8 hours as needed for Pain    Historical Provider, MD   docusate sodium (COLACE) 100 MG capsule Take 100 mg by mouth as needed for Constipation (Daily PRN)     Historical Provider, MD     Additional information:       VITAL SIGNS   Vitals:    07/13/22 0830   BP: (!) 87/45   Pulse: (!) 130   Resp: 19   Temp:    SpO2: 95%       PHYSICAL:   Heart:  [x]Regular rate and rhythm  []Other:    Lungs:  [x]Clear    []Other:    Abdomen: [x]Soft    []Other:    Mental Status: []Alert & Oriented  [x]Other:confused       PLANNED PROCEDURE   []Biospy []Arteriogram              []Drainage   []Mediport Insertion  []Fistulogram []IV access       []Vertebroplasty / Augmentation  []IVC filter []Dialysis catheter []Biliary drainage  []Other: []CAPD Catheter [x]Nephrostomy Tube / Stent  SEDATION  Planned agent:[x]Midazolam []Meperidine [x]Sublimaze []Dilaudid []Morphine     []Diazepam  []Other:     ASA Classification:  []1 [x]2 []3 []4 []5  Class 1: A normal healthy patient  Class 2: Pt with mild to moderate systemic disease  Class 3: Severe systemic disease or disturbance  Class 4: Severe systemic disorders that are already life threatening. Class 5: Moribund pt with little chances of survival, for more than 24 hours. Mallampati I Airway Classification:   []1 [x]2 []3 []4    [x]Pre-procedure diagnostic studies complete and results available. Comment:    [x]Previous sedation/anesthesia experiences assessed. Comment:    [x]The patient is an appropriate candidate to undergo the planned procedure sedation and anesthesia. (Refer to nursing sedation/analgesia documentation record)  [x]Formulation and discussion of sedation/procedure plan, risks, and expectations with patient and/or responsible adult completed. [x]Patient examined immediately prior to the procedure.  (Refer to nursing sedation/analgesia documentation record)    Radha Hyatt MD, MD  Electronically signed 7/13/2022 at 8:34 AM

## 2022-07-13 NOTE — H&P
Formulation and discussion of sedation / procedure plans, risks, benefits, side effects and alternatives with patient and/or responsible adult completed.     Electronically signed by Sanna Dolan MD on 7/13/22 at 8:34 AM EDT

## 2022-07-13 NOTE — PROGRESS NOTES
0800 Report and care received from Jose Alarcon 96 This procedure has been fully reviewed with the patient and written informed consent has been obtained. [id number] Procedure started with Dr. Ben Salazar. 0830 Procedure completed; patient tolerated well. Dressing to left flank; no bleeding noted. 0840 Patient on bed; comfort ensured. 4214 Patient taken to 5K via bed. Attempted to call report; no answer.

## 2022-07-13 NOTE — PROGRESS NOTES
5,000 Units SubCUTAneous 3 times per day     Meds prn: sodium chloride, acetaminophen, docusate sodium, ipratropium-albuterol, melatonin, sodium chloride flush, sodium chloride, ondansetron **OR** ondansetron, polyethylene glycol, traMADol     Lab Data :  CBC:   Recent Labs     07/11/22  1001 07/11/22  1001 07/12/22  0545 07/12/22  0545 07/12/22  1124 07/13/22  0041 07/13/22  0531   WBC 20.4*  --  17.6*  --   --   --  20.9*   HGB 7.0*   < > 6.7*   < > 6.6* 8.2* 8.1*   HCT 24.5*   < > 23.9*   < > 23.1* 28.0* 29.6*     --  291  --   --   --  310    < > = values in this interval not displayed. CMP:  Recent Labs     07/11/22  1001 07/12/22  0545 07/13/22  0531   * 134* 136   K 4.0 4.0 4.0   CL 96* 100 102   CO2 20* 19* 16*   * 99* 92*   CREATININE 2.7* 2.7* 3.0*   GLUCOSE 117* 79 77   CALCIUM 8.7 8.5 8.4*     Hepatic: No results for input(s): LABALBU, AST, ALT, ALB, BILITOT, ALKPHOS in the last 72 hours. Assessment and Plan:  1. Renal -acute kidney injury needing hemodialysis  ? Based on the BMP her creatinine is 3.0 currently on IV fluids and creatinine is up to 3.0 BUN around 90  ? Discontinue IV fluids decent urine output of around 1400 mL. However in the outpatient setting patient's urine creatinine clearance was only 8 mL  ? No acute need for dialysis at this time might consider dialysis in the morning     2. Electrolytes -mild hyponatremia secondary to renal dysfunction improved with IV fluids  3. Mild metabolic acidosis with some elevated lactic acid currently on IV fluids  4. Leukocytosis-getting some antibiotics  5. Chronic bilateral obstruction due to staghorn calculi will have a nephrostomy tube replaced today  6. Hypotension status post IV fluids currently better  7. Meds reviewed and discussed with patient       Yuly Murillo MD  [facility]    This report has been created using voice recognition software.  It may contain minor errors which are inherent in voice recognition technology

## 2022-07-13 NOTE — PLAN OF CARE
Problem: Chronic Conditions and Co-morbidities  Goal: Patient's chronic conditions and co-morbidity symptoms are monitored and maintained or improved  7/13/2022 0206 by Too Rain RN  Outcome: Progressing  7/12/2022 1456 by Margarita Lindsey RN  Outcome: Progressing  Flowsheets (Taken 7/12/2022 0926)  Care Plan - Patient's Chronic Conditions and Co-Morbidity Symptoms are Monitored and Maintained or Improved: Monitor and assess patient's chronic conditions and comorbid symptoms for stability, deterioration, or improvement     Problem: Safety - Adult  Goal: Free from fall injury  7/13/2022 0206 by Too Rain RN  Outcome: Progressing  7/12/2022 1456 by Margarita Lindsey RN  Outcome: Progressing  Flowsheets (Taken 7/12/2022 1232)  Free From Fall Injury: Lori Perry family/caregiver on patient safety     Problem: ABCDS Injury Assessment  Goal: Absence of physical injury  7/13/2022 0206 by Too Rain RN  Outcome: Progressing  7/12/2022 1456 by Margarita Lindsey RN  Outcome: Progressing  Flowsheets (Taken 7/12/2022 1232)  Absence of Physical Injury: Implement safety measures based on patient assessment     Problem: Skin/Tissue Integrity  Goal: Absence of new skin breakdown  Description: 1. Monitor for areas of redness and/or skin breakdown  2. Assess vascular access sites hourly  3. Every 4-6 hours minimum:  Change oxygen saturation probe site  4. Every 4-6 hours:  If on nasal continuous positive airway pressure, respiratory therapy assess nares and determine need for appliance change or resting period.   7/13/2022 0206 by Too Rain RN  Outcome: Progressing  7/12/2022 1456 by Margarita Lindsey RN  Outcome: Progressing     Problem: Discharge Planning  Goal: Discharge to home or other facility with appropriate resources  7/13/2022 0206 by Too Rain RN  Outcome: Progressing  7/12/2022 1456 by Margarita Lindsey RN  Outcome: Progressing  Flowsheets (Taken 7/12/2022 6322)  Discharge to home or other facility with appropriate resources: Identify barriers to discharge with patient and caregiver     Problem: Nutrition Deficit:  Goal: Optimize nutritional status  7/13/2022 0206 by Rachel Aguilar RN  Outcome: Progressing  7/12/2022 1456 by August Mauricio RN  Outcome: Progressing   Care plan reviewed with patient.

## 2022-07-13 NOTE — PLAN OF CARE
Problem: Chronic Conditions and Co-morbidities  Goal: Patient's chronic conditions and co-morbidity symptoms are monitored and maintained or improved  7/13/2022 1444 by Piotr Schreiber RN  Outcome: Progressing   Patient's chronic conditions and co-morbidity symptoms are monitored and maintained. Problem: Safety - Adult  Goal: Free from fall injury  7/13/2022 1444 by Piotr Schreiber RN  Outcome: Progressing  Flowsheets (Taken 7/13/2022 0222 by Chelle Goncalves RN)  Free From Fall Injury: Instruct family/caregiver on patient safety  Fall assessment completed. Patient using call light appropriately to call for assistance. Personal items within reach. Patient is also compliant with use of non-skid slippers. Patient's chronic conditions and co-morbidity symptoms are monitored and maintained     Problem: ABCDS Injury Assessment  Goal: Absence of physical injury  7/13/2022 1444 by Piotr Schreiber RN  Outcome: Progressing  Flowsheets (Taken 7/13/2022 0222 by Chelle Goncalves RN)  Absence of Physical Injury: Implement safety measures based on patient assessment  No falls noted this shift. Family member at bedside, spent the day Bed kept in low position. Safe environment maintained. Bedside table & call light in reach. Uses call light appropriately when needing assistance. Problem: Skin/Tissue Integrity  Goal: Absence of new skin breakdown  Description: 1. Monitor for areas of redness and/or skin breakdown  2. Assess vascular access sites hourly  3. Every 4-6 hours minimum:  Change oxygen saturation probe site  4. Every 4-6 hours:  If on nasal continuous positive airway pressure, respiratory therapy assess nares and determine need for appliance change or resting period. 7/13/2022 1444 by Piotr Schreiber RN  Outcome: Progressing  No skin breakdown this shift. Patient being assisted with turning. Patients states understanding of repositioning every two hours.       Problem: Discharge Planning  Goal: Discharge to home or other facility with appropriate resources  7/13/2022 1444 by Nallely Finnegan RN  Outcome: Progressing  Discharge plan is in process. Plan discharge to ECF. Problem: Nutrition Deficit:  Goal: Optimize nutritional status  7/13/2022 1444 by Nalleyl Finnegan RN  Outcome: Progressing  Patients appetite good . Continuing to encourage fluids. Problem: Pain  Goal: Verbalizes/displays adequate comfort level or baseline comfort level  Outcome: Progressing  Patient states pain relief from PRN pain medications. Pain reassessed one hour post PRN pain medication given. Patient rates pain 0 on RASHAD 0-10 scale. Care plan reviewed with patient and family. Patient and family verbalize understanding of the plan of care and contribute to goal setting.

## 2022-07-13 NOTE — PROGRESS NOTES
Urology Progress Note    Chief Complaint: neph tube exchange    Subjective: \"    Patient is resting in bed, voiding yellow urine with sediment in seaman, neph draining yellow urine, denies any nausea or vomiting. There are complaints of no pain at this time. IR exchanged left neph this AM        Vitals:  BP (!) 91/54   Pulse (!) 108   Temp 97.9 °F (36.6 °C) (Oral)   Resp 16   Ht 4' 9\" (1.448 m)   Wt 141 lb 3.2 oz (64 kg)   SpO2 92%   BMI 30.56 kg/m²   Temp  Av.3 °F (36.8 °C)  Min: 97.6 °F (36.4 °C)  Max: 98.7 °F (37.1 °C)    Intake/Output Summary (Last 24 hours) at 2022 1040  Last data filed at 2022 0240  Gross per 24 hour   Intake 310 ml   Output 1400 ml   Net -1090 ml       Social History     Socioeconomic History    Marital status: Single     Spouse name: Not on file    Number of children: 0    Years of education: Not on file    Highest education level: Not on file   Occupational History    Not on file   Tobacco Use    Smoking status: Never Smoker    Smokeless tobacco: Never Used   Vaping Use    Vaping Use: Never used   Substance and Sexual Activity    Alcohol use: No    Drug use: No    Sexual activity: Not Currently   Other Topics Concern    Not on file   Social History Narrative    Not on file     Social Determinants of Health     Financial Resource Strain: Low Risk     Difficulty of Paying Living Expenses: Not very hard   Food Insecurity: No Food Insecurity    Worried About Running Out of Food in the Last Year: Never true    Traci of Food in the Last Year: Never true   Transportation Needs:     Lack of Transportation (Medical): Not on file    Lack of Transportation (Non-Medical):  Not on file   Physical Activity:     Days of Exercise per Week: Not on file    Minutes of Exercise per Session: Not on file   Stress:     Feeling of Stress : Not on file   Social Connections:     Frequency of Communication with Friends and Family: Not on file    Frequency of Social Gatherings with Friends and Family: Not on file    Attends Shinto Services: Not on file    Active Member of Clubs or Organizations: Not on file    Attends Club or Organization Meetings: Not on file    Marital Status: Not on file   Intimate Partner Violence:     Fear of Current or Ex-Partner: Not on file    Emotionally Abused: Not on file    Physically Abused: Not on file    Sexually Abused: Not on file   Housing Stability:     Unable to Pay for Housing in the Last Year: Not on file    Number of Places Lived in the Last Year: Not on file    Unstable Housing in the Last Year: Not on file     Family History   Problem Relation Age of Onset    Diabetes Father     Arthritis Mother     COPD Mother     Diabetes Sister     Heart Disease Maternal Uncle     Breast Cancer Niece 36    Sleep Apnea Brother     Asthma Neg Hx     Birth Defects Neg Hx     Cancer Neg Hx     Depression Neg Hx     Early Death Neg Hx     Hearing Loss Neg Hx     High Blood Pressure Neg Hx     High Cholesterol Neg Hx     Kidney Disease Neg Hx     Learning Disabilities Neg Hx     Mental Illness Neg Hx     Mental Retardation Neg Hx     Miscarriages / Stillbirths Neg Hx     Stroke Neg Hx     Substance Abuse Neg Hx     Vision Loss Neg Hx     Other Neg Hx      No Known Allergies      Constitutional:               Alert and oriented times 3, no acute distress and cooperative to examination with appropriate mood and affect. HEENT:   Head:               Normocephalic and atraumatic. Mouth/Throat:                Mucous membranes are normal.   Eyes:               EOM are normal. No scleral icterus. Nose:               The external appearance of the nose is normal  Ears: The ears appear normal to external inspection   Neck:               Supple, symmetrical, trachea midline, no adenopathy, thyroid symmetric, not enlarged and no tenderness.    Cardiovascular:               Normal rate, regular rhythm, S1 S2

## 2022-07-13 NOTE — CARE COORDINATION
7/13/22, 1:44 PM EDT    DISCHARGE PLANNING EVALUATION      Dot met with pt this afternoon, pt had her nephrostomy tube exchanged this morning. SW did ask about transport back to facility, pt reports sister Jamari normally provides transportation. SW did ask if SW needing to contact sister, pt declines. Unsure of when discharge is planned at this time, DOT did let nurse know pt will need COVID test prior to return and pt reports sister transports. [place] paperwork on chart in case pt unable to go by private car. DOT did call [place] with [facility], updated on unsure of discharge at this time, however could possibly be today. She will let her team know.

## 2022-07-14 LAB
ANION GAP SERPL CALCULATED.3IONS-SCNC: 14 MEQ/L (ref 8–16)
ANION GAP SERPL CALCULATED.3IONS-SCNC: 15 MEQ/L (ref 8–16)
BUN BLDV-MCNC: 89 MG/DL (ref 7–22)
BUN BLDV-MCNC: 92 MG/DL (ref 7–22)
CALCIUM IONIZED: 1.25 MMOL/L (ref 1.12–1.32)
CALCIUM SERPL-MCNC: 8.6 MG/DL (ref 8.5–10.5)
CALCIUM SERPL-MCNC: 8.7 MG/DL (ref 8.5–10.5)
CHLORIDE BLD-SCNC: 103 MEQ/L (ref 98–111)
CHLORIDE BLD-SCNC: 106 MEQ/L (ref 98–111)
CO2: 17 MEQ/L (ref 23–33)
CO2: 18 MEQ/L (ref 23–33)
CREAT SERPL-MCNC: 3.1 MG/DL (ref 0.4–1.2)
CREAT SERPL-MCNC: 3.3 MG/DL (ref 0.4–1.2)
EKG ATRIAL RATE: 112 BPM
EKG P AXIS: 71 DEGREES
EKG P-R INTERVAL: 136 MS
EKG Q-T INTERVAL: 320 MS
EKG QRS DURATION: 86 MS
EKG QTC CALCULATION (BAZETT): 467 MS
EKG R AXIS: 55 DEGREES
EKG T AXIS: 60 DEGREES
EKG VENTRICULAR RATE: 128 BPM
ERYTHROCYTE [DISTWIDTH] IN BLOOD BY AUTOMATED COUNT: 18.2 % (ref 11.5–14.5)
ERYTHROCYTE [DISTWIDTH] IN BLOOD BY AUTOMATED COUNT: 60.8 FL (ref 35–45)
GFR SERPL CREATININE-BSD FRML MDRD: 14 ML/MIN/1.73M2
GFR SERPL CREATININE-BSD FRML MDRD: 15 ML/MIN/1.73M2
GLUCOSE BLD-MCNC: 199 MG/DL (ref 70–108)
GLUCOSE BLD-MCNC: 93 MG/DL (ref 70–108)
HCT VFR BLD CALC: 28.8 % (ref 37–47)
HEMOGLOBIN: 8.2 GM/DL (ref 12–16)
LACTIC ACID: 2.3 MMOL/L (ref 0.5–2)
MAGNESIUM: 1.6 MG/DL (ref 1.6–2.4)
MCH RBC QN AUTO: 26.1 PG (ref 26–33)
MCHC RBC AUTO-ENTMCNC: 28.5 GM/DL (ref 32.2–35.5)
MCV RBC AUTO: 91.7 FL (ref 81–99)
PHOSPHORUS: 5.8 MG/DL (ref 2.4–4.7)
PLATELET # BLD: 355 THOU/MM3 (ref 130–400)
PMV BLD AUTO: 8.7 FL (ref 9.4–12.4)
POTASSIUM SERPL-SCNC: 3.7 MEQ/L (ref 3.5–5.2)
POTASSIUM SERPL-SCNC: 4 MEQ/L (ref 3.5–5.2)
PROCALCITONIN: 1.29 NG/ML (ref 0.01–0.09)
RBC # BLD: 3.14 MILL/MM3 (ref 4.2–5.4)
SODIUM BLD-SCNC: 135 MEQ/L (ref 135–145)
SODIUM BLD-SCNC: 138 MEQ/L (ref 135–145)
TSH SERPL DL<=0.05 MIU/L-ACNC: 4.06 UIU/ML (ref 0.4–4.2)
WBC # BLD: 19.3 THOU/MM3 (ref 4.8–10.8)

## 2022-07-14 PROCEDURE — 36415 COLL VENOUS BLD VENIPUNCTURE: CPT

## 2022-07-14 PROCEDURE — 84443 ASSAY THYROID STIM HORMONE: CPT

## 2022-07-14 PROCEDURE — 84100 ASSAY OF PHOSPHORUS: CPT

## 2022-07-14 PROCEDURE — 83735 ASSAY OF MAGNESIUM: CPT

## 2022-07-14 PROCEDURE — 93005 ELECTROCARDIOGRAM TRACING: CPT | Performed by: UROLOGY

## 2022-07-14 PROCEDURE — 51702 INSERT TEMP BLADDER CATH: CPT

## 2022-07-14 PROCEDURE — 99232 SBSQ HOSP IP/OBS MODERATE 35: CPT | Performed by: INTERNAL MEDICINE

## 2022-07-14 PROCEDURE — 85027 COMPLETE CBC AUTOMATED: CPT

## 2022-07-14 PROCEDURE — G0378 HOSPITAL OBSERVATION PER HR: HCPCS

## 2022-07-14 PROCEDURE — 96366 THER/PROPH/DIAG IV INF ADDON: CPT

## 2022-07-14 PROCEDURE — 82330 ASSAY OF CALCIUM: CPT

## 2022-07-14 PROCEDURE — 6360000002 HC RX W HCPCS: Performed by: UROLOGY

## 2022-07-14 PROCEDURE — 2580000003 HC RX 258: Performed by: FAMILY MEDICINE

## 2022-07-14 PROCEDURE — 6370000000 HC RX 637 (ALT 250 FOR IP): Performed by: INTERNAL MEDICINE

## 2022-07-14 PROCEDURE — 83605 ASSAY OF LACTIC ACID: CPT

## 2022-07-14 PROCEDURE — 96361 HYDRATE IV INFUSION ADD-ON: CPT

## 2022-07-14 PROCEDURE — 2580000003 HC RX 258: Performed by: UROLOGY

## 2022-07-14 PROCEDURE — 93010 ELECTROCARDIOGRAM REPORT: CPT | Performed by: INTERNAL MEDICINE

## 2022-07-14 PROCEDURE — 6370000000 HC RX 637 (ALT 250 FOR IP): Performed by: UROLOGY

## 2022-07-14 PROCEDURE — 96376 TX/PRO/DX INJ SAME DRUG ADON: CPT

## 2022-07-14 PROCEDURE — 99225 PR SBSQ OBSERVATION CARE/DAY 25 MINUTES: CPT | Performed by: NURSE PRACTITIONER

## 2022-07-14 PROCEDURE — 2580000003 HC RX 258

## 2022-07-14 PROCEDURE — 80048 BASIC METABOLIC PNL TOTAL CA: CPT

## 2022-07-14 PROCEDURE — 6360000002 HC RX W HCPCS

## 2022-07-14 PROCEDURE — 84145 PROCALCITONIN (PCT): CPT

## 2022-07-14 PROCEDURE — 93005 ELECTROCARDIOGRAM TRACING: CPT

## 2022-07-14 RX ORDER — SODIUM CHLORIDE 9 MG/ML
INJECTION, SOLUTION INTRAVENOUS CONTINUOUS
Status: DISCONTINUED | OUTPATIENT
Start: 2022-07-14 | End: 2022-07-19

## 2022-07-14 RX ORDER — SODIUM CHLORIDE, SODIUM LACTATE, POTASSIUM CHLORIDE, AND CALCIUM CHLORIDE .6; .31; .03; .02 G/100ML; G/100ML; G/100ML; G/100ML
1000 INJECTION, SOLUTION INTRAVENOUS ONCE
Status: COMPLETED | OUTPATIENT
Start: 2022-07-14 | End: 2022-07-15

## 2022-07-14 RX ORDER — FENTANYL CITRATE 50 UG/ML
25 INJECTION, SOLUTION INTRAMUSCULAR; INTRAVENOUS ONCE
Status: COMPLETED | OUTPATIENT
Start: 2022-07-14 | End: 2022-07-15

## 2022-07-14 RX ORDER — MIDODRINE HYDROCHLORIDE 5 MG/1
5 TABLET ORAL
Status: DISCONTINUED | OUTPATIENT
Start: 2022-07-14 | End: 2022-07-15

## 2022-07-14 RX ORDER — OXYCODONE HYDROCHLORIDE AND ACETAMINOPHEN 5; 325 MG/1; MG/1
1 TABLET ORAL EVERY 8 HOURS PRN
Status: DISCONTINUED | OUTPATIENT
Start: 2022-07-14 | End: 2022-07-14

## 2022-07-14 RX ADMIN — SODIUM CHLORIDE, PRESERVATIVE FREE 10 ML: 5 INJECTION INTRAVENOUS at 09:55

## 2022-07-14 RX ADMIN — SODIUM CHLORIDE: 9 INJECTION, SOLUTION INTRAVENOUS at 12:00

## 2022-07-14 RX ADMIN — SODIUM CHLORIDE, POTASSIUM CHLORIDE, SODIUM LACTATE AND CALCIUM CHLORIDE 1000 ML: 600; 310; 30; 20 INJECTION, SOLUTION INTRAVENOUS at 21:33

## 2022-07-14 RX ADMIN — DAKIN'S SOLUTION 0.125% (QUARTER STRENGTH): 0.12 SOLUTION at 11:00

## 2022-07-14 RX ADMIN — PIPERACILLIN AND TAZOBACTAM 2250 MG: 2; .25 INJECTION, POWDER, LYOPHILIZED, FOR SOLUTION INTRAVENOUS at 10:40

## 2022-07-14 RX ADMIN — PIPERACILLIN AND TAZOBACTAM 2250 MG: 2; .25 INJECTION, POWDER, LYOPHILIZED, FOR SOLUTION INTRAVENOUS at 20:14

## 2022-07-14 RX ADMIN — ACETAMINOPHEN 325MG 650 MG: 325 TABLET ORAL at 20:09

## 2022-07-14 RX ADMIN — MIDODRINE HYDROCHLORIDE 5 MG: 5 TABLET ORAL at 13:28

## 2022-07-14 RX ADMIN — FOLIC ACID 1 MG: 1 TABLET ORAL at 09:53

## 2022-07-14 RX ADMIN — FAMOTIDINE 20 MG: 20 TABLET ORAL at 09:52

## 2022-07-14 RX ADMIN — SERTRALINE 25 MG: 50 TABLET, FILM COATED ORAL at 09:52

## 2022-07-14 RX ADMIN — SODIUM CHLORIDE, PRESERVATIVE FREE 10 ML: 5 INJECTION INTRAVENOUS at 13:30

## 2022-07-14 RX ADMIN — TRAMADOL HYDROCHLORIDE 50 MG: 50 TABLET, COATED ORAL at 07:30

## 2022-07-14 RX ADMIN — SODIUM CHLORIDE, PRESERVATIVE FREE 10 ML: 5 INJECTION INTRAVENOUS at 09:54

## 2022-07-14 RX ADMIN — PIPERACILLIN AND TAZOBACTAM 2250 MG: 2; .25 INJECTION, POWDER, LYOPHILIZED, FOR SOLUTION INTRAVENOUS at 02:43

## 2022-07-14 RX ADMIN — MIDODRINE HYDROCHLORIDE 5 MG: 5 TABLET ORAL at 17:05

## 2022-07-14 RX ADMIN — TRAMADOL HYDROCHLORIDE 50 MG: 50 TABLET, COATED ORAL at 20:09

## 2022-07-14 ASSESSMENT — PAIN DESCRIPTION - LOCATION
LOCATION: OTHER (COMMENT)
LOCATION: LEG;SACRUM

## 2022-07-14 ASSESSMENT — PAIN SCALES - GENERAL
PAINLEVEL_OUTOF10: 9
PAINLEVEL_OUTOF10: 6
PAINLEVEL_OUTOF10: 5
PAINLEVEL_OUTOF10: 9
PAINLEVEL_OUTOF10: 5
PAINLEVEL_OUTOF10: 5

## 2022-07-14 ASSESSMENT — PAIN DESCRIPTION - DESCRIPTORS: DESCRIPTORS: ACHING

## 2022-07-14 NOTE — PROGRESS NOTES
[facility]   Nephrology progress note  7/14/2022, 8:30 AM      Pt Name:    Gin Steven  MRN:     [id number]     YOB: 1950  Admit Date:    7/11/2022  8:49 AM    Chief Complaint: Nephrology following for Acute kidney injury needing hemodialysis .     Subjective:  Patient seen and examined  No chest pain or shortness of breath  Says she is overall feeling okay  Denies any pain anywhere    Objective:  24HR INTAKE/OUTPUT:      Intake/Output Summary (Last 24 hours) at 7/14/2022 0830  Last data filed at 7/14/2022 0649  Gross per 24 hour   Intake 220 ml   Output 1350 ml   Net -1130 ml      Admission weight: 141 lb 3.2 oz (64 kg)  Wt Readings from Last 3 Encounters:   07/11/22 141 lb 3.2 oz (64 kg)   06/24/22 140 lb (63.5 kg)   05/31/22 140 lb (63.5 kg)        Vitals :   Vitals:    07/13/22 2245 07/14/22 0245 07/14/22 0800 07/14/22 0808   BP: (!) 96/59 (!) 110/56 (!) 98/53    Pulse: 78 98 (!) 130 (!) 120   Resp: 18  28    Temp: 98.6 °F (37 °C) 98.6 °F (37 °C) 100.3 °F (37.9 °C)    TempSrc: Oral Oral Oral    SpO2: 93%      Weight:       Height:           Physical examination  General Appearance: Cachectic ill nourished no distress  Mouth/Throat:  Oral mucosa moist  Neck:  Supple, no JVD  Lungs:  Breath sounds: clear  Heart[de-identified]  S1,S2 heard  Abdomen:  Soft, non - tender  Musculoskeletal:  Edema -no significant edema noted    Medications:  Infusion:    sodium chloride      sodium chloride       Meds:    [Held by provider] bumetanide  1 mg Oral Daily    famotidine  20 mg PEG Tube Daily    folic acid  1 mg Oral Daily    [Held by provider] metoprolol tartrate  25 mg Oral BID    sertraline  25 mg Oral Daily    sodium chloride flush  10 mL IntraCATHeter TID    sodium hypochlorite   Irrigation BID    sodium chloride flush  5-40 mL IntraVENous 2 times per day    [Held by provider] heparin (porcine)  5,000 Units SubCUTAneous 3 times per day     Meds prn: sodium chloride, acetaminophen, docusate sodium, ipratropium-albuterol, melatonin, sodium chloride flush, sodium chloride, ondansetron **OR** ondansetron, polyethylene glycol, traMADol     Lab Data :  CBC:   Recent Labs     07/11/22  1001 07/11/22  1001 07/12/22  0545 07/12/22  0545 07/12/22  1124 07/13/22  0041 07/13/22  0531   WBC 20.4*  --  17.6*  --   --   --  20.9*   HGB 7.0*   < > 6.7*   < > 6.6* 8.2* 8.1*   HCT 24.5*   < > 23.9*   < > 23.1* 28.0* 29.6*     --  291  --   --   --  310    < > = values in this interval not displayed. CMP:  Recent Labs     07/12/22  0545 07/13/22  0531 07/14/22  0548   * 136 138   K 4.0 4.0 4.0    102 106   CO2 19* 16* 18*   BUN 99* 92* 92*   CREATININE 2.7* 3.0* 3.1*   GLUCOSE 79 77 93   CALCIUM 8.5 8.4* 8.6     Hepatic: No results for input(s): LABALBU, AST, ALT, ALB, BILITOT, ALKPHOS in the last 72 hours. Assessment and Plan:  1. Renal -acute kidney injury needing hemodialysis  ? Based on the BMP her creatinine is 3.0 currently on IV fluids and creatinine is up to 3.0 BUN around 90  ? Discontinue IV fluids decent urine output of around 1400 mL. However in the outpatient setting patient's urine creatinine clearance was only 8 mL  ? No acute need for dialysis at this time      2. Electrolytes -mild hyponatremia secondary to renal dysfunction improved with IV fluids  3. Mild metabolic acidosis with some elevated lactic acid improved  4. Leukocytosis-getting some antibiotics  5. 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 Status post nephrostomy tube placement 7/13/2022  6. Hypotension status post IV fluids currently better. Also start her on midodrine 5 3 times daily  7. Meds reviewed and discussed with patient       Mesfin Bellamy MD  [facility]    This report has been created using voice recognition software.  It may contain minor errors which are inherent in voice recognition technology

## 2022-07-14 NOTE — PROGRESS NOTES
Pt hypotensive and tachycardiac with a low grade temp this am. Hr 130, Bp 98/56, oral temp 100.3. Urology -Selwyn George NP and Julia Mckee notified. IV zosyn + new seaman exchange ordered. IV Zosyn given as ordered and new seaman placed. Pt noted to have green/yellow purulent drainage from vaginal area. Wounds to buttocks and L flank changed and completed as ordered. Heavy green/yellow purulent drainage noted to L flank dressing (previous old neph site). EDE Allen made aware. Pt instructed and encouraged to drink water throughout the day. No new orders at this time.

## 2022-07-14 NOTE — PLAN OF CARE
Problem: Chronic Conditions and Co-morbidities  Goal: Patient's chronic conditions and co-morbidity symptoms are monitored and maintained or improved  7/14/2022 0123 by Landy Osgood, RN  Outcome: Progressing  7/13/2022 1444 by Fletcher Fonseca RN  Outcome: Progressing     Problem: Safety - Adult  Goal: Free from fall injury  7/14/2022 0123 by Landy Osgood, RN  Outcome: Progressing  Flowsheets (Taken 7/14/2022 0119)  Free From Fall Injury: Instruct family/caregiver on patient safety  7/13/2022 1444 by Fletcher Fonseca RN  Outcome: Progressing  Flowsheets (Taken 7/13/2022 0222 by Landy Osgood, RN)  Free From Fall Injury: Instruct family/caregiver on patient safety     Problem: ABCDS Injury Assessment  Goal: Absence of physical injury  7/14/2022 0123 by Landy Osgood, RN  Outcome: Progressing  7/13/2022 1444 by Fletcher Fonseca RN  Outcome: Progressing  Flowsheets (Taken 7/13/2022 0222 by Landy Osgood, RN)  Absence of Physical Injury: Implement safety measures based on patient assessment     Problem: Skin/Tissue Integrity  Goal: Absence of new skin breakdown  Description: 1. Monitor for areas of redness and/or skin breakdown  2. Assess vascular access sites hourly  3. Every 4-6 hours minimum:  Change oxygen saturation probe site  4. Every 4-6 hours:  If on nasal continuous positive airway pressure, respiratory therapy assess nares and determine need for appliance change or resting period.   7/14/2022 0123 by Landy Osgood, RN  Outcome: Progressing  7/13/2022 1444 by Fletcher Fonseca RN  Outcome: Progressing     Problem: Discharge Planning  Goal: Discharge to home or other facility with appropriate resources  7/14/2022 0123 by Landy Osgood, RN  Outcome: Progressing  7/13/2022 1444 by Fletcher Fonseca RN  Outcome: Progressing     Problem: Nutrition Deficit:  Goal: Optimize nutritional status  7/14/2022 0123 by Landy Osgood, RN  Outcome: Progressing  7/13/2022 1444 by Fletcher Fonseca RN  Outcome: Progressing     Problem: Pain  Goal: Verbalizes/displays adequate comfort level or baseline comfort level  7/14/2022 0123 by Sundeep Ruiz RN  Outcome: Progressing  7/13/2022 1444 by Lizzy Reddy RN  Outcome: Progressing   Care plan reviewed with patient . Patient verbalize understanding of the plan of care and contribute to goal setting.

## 2022-07-14 NOTE — PROGRESS NOTES
Hospitalist Progress Note      Patient:  Keysha Zhu    Unit/Bed:5K-19/019-A  YOB: 1950  MRN: [id number]   Acct: [de-identified]   PCP: Tony Reyna MD  Date of Admission: 7/11/2022    Assessment/Plan:    1. Sepsis 2/2 CAUTI:  · Pt hypotensive and tachycardiac, +leukocytosis, lactic acidosis on 07/12. IV Zosyn and IVFs were initiated on admission. Lactic trended down to WNL. Blood cultures negative to date. Patient admitted for preop antibiotics prior to next tube exchange. Pt underwent neph tube exchange on 07/13. She is persistently tachycardic, hypotensive, and febrile this AM.   · Padilla catheter to be exchanged. Continue with IV Zosyn and discussed restarting IVFs with nephrology. Will restart IVFs at 50 cc/hr, pt does appear dry on exam.   3. Hypotension:   · Patient's sister at bedside reports that patient has chronically low BP. However, exacerbated by  volume depletion. Initially improved with 500cc bolus on 07/12. · Nephrology following, discontinued IV fluids on 07/13. However, this AM patient appears to be dry. Discussed with nephrology, restarting IVFs this afternoon at 50 cc/hr. 3. ALVIN, requiring HD:   · Patient started on HD in 04/2022. Nephrology following. 4. Acute on chronic normocytic anemia:   · Anemia of chronic disease, on PARRIS as OP. Hgb 6.7 07/12, pt hemodynamically stable and no signs of acute bleeding. Pt received 1 U PRBC. · Hemoglobin appears to have stabilized, 8.1 today. We will continue to trend. No signs of bleeding on examination. 5. Dysphagia, malnutrition:  ·  h/o PEG tube, dietitian consulted  6. Chronic HFpEF without deompensaiton:   · TOBIAS 10/2021 EF 60%. Currently on hold as patient appears dry and BP remains soft. Metoprolol also held per primary. .    7. Hx stage III decub ulcer s/p diverting colostomy 1/27/22:   · wound care consulted   8. Anxiety:   · Continue home meds.       Chief Complaint: Neck tube exchange    Initial H and P:-    Chart review: \"Kenzie Diop 71 y.o. female who we are asked to see/evaluate by Keara Hall MD for medical management of hypotension.  Patient directly admitted by urology service for preop antibiotics prior to nephrostomy tube exchange.  Patient is chronically ill, has pain for nutrition, diverting colostomy bag for stage III decubitus ulcer, nephrostomy tubes. Patient has history of obstructive staghorn calculus of left kidney, was not a candidate for stone treatment and nephrostomy tube was placed.  Patient with purulent drainage from left nephrostomy tube, concentrated yellow urine in Padilla bag.  Patient has multiple large wounds, wound care is following.  Patient seen with family at bedside.  No acute complaints at this time.  She denies fever/chills, chest pain, shortness of breath, abdominal pain, N/V/D.  Denies pain at nephrostomy tube site.  Hospitalist will continue to follow.      7/12: Patient resting comfortably. Alert to verbal stimuli, nods yes/no appropriately to questions. Denies f/c, sob, cp, abd pain, nvd. Hgb 6.7 today, 1 unit PRBC ordered. No signs of bleeding. 7/13: Patient resting in bed s/p neph tube exchange. Patient appears to be doing well, voiding yellow urine. Patient denies abdominal pain, nausea, vomiting, chest pain, shortness of breath. Patient's BP remains soft,    Subjective (past 24 hours):   7/14: Patient resting in no distress, persistent hypotension, and T max 100.1 this AM patient reports that she feels great. She does continue to state that she is simply \"thirsty\". Denies any concerns, no cp, sob, abd pain, n/v, or any other concerns. Will continue with IV Zosyn pending final urine cx- to be obtained one new cath is placed. Past medical history, family history, social history and allergies reviewed again and is unchanged since admission. ROS (All review of systems completed. Pertinent positives noted.  Otherwise All other systems reviewed and negative.)     Medications:  Reviewed    Infusion Medications    sodium chloride 50 mL/hr at 07/14/22 1200    sodium chloride      sodium chloride       Scheduled Medications    piperacillin-tazobactam  2,250 mg IntraVENous Q8H    midodrine  5 mg Oral TID WC    [Held by provider] bumetanide  1 mg Oral Daily    famotidine  20 mg PEG Tube Daily    folic acid  1 mg Oral Daily    [Held by provider] metoprolol tartrate  25 mg Oral BID    sertraline  25 mg Oral Daily    sodium chloride flush  10 mL IntraCATHeter TID    sodium hypochlorite   Irrigation BID    sodium chloride flush  5-40 mL IntraVENous 2 times per day    [Held by provider] heparin (porcine)  5,000 Units SubCUTAneous 3 times per day     PRN Meds: sodium chloride, acetaminophen, docusate sodium, ipratropium-albuterol, melatonin, sodium chloride flush, sodium chloride, ondansetron **OR** ondansetron, polyethylene glycol, traMADol      Intake/Output Summary (Last 24 hours) at 7/14/2022 1332  Last data filed at 7/14/2022 0443  Gross per 24 hour   Intake --   Output 1350 ml   Net -1350 ml       Diet:  ADULT DIET; Easy to Chew  ADULT TUBE FEEDING; PEG; Renal Formula; Cyclic; 55; 1:47 PM; 1:11 AM; 30; Other (specify); at start and stop of TF    Exam:  BP (!) 102/90   Pulse (!) 120   Temp 99.4 °F (37.4 °C) (Oral)   Resp 16   Ht 4' 9\" (1.448 m)   Wt 141 lb 3.2 oz (64 kg)   SpO2 93%   BMI 30.56 kg/m²   General appearance: Chronically ill appearing. No apparent distress,   HEENT: Normal cephalic, atraumatic without obvious deformity. Pupils equal, round, and reactive to light. Extra ocular muscles intact. Conjunctivae/corneas clear. Neck: Supple, with full range of motion. No jugular venous distention. Trachea midline. Respiratory:  Normal respiratory effort. Clear to auscultation, bilaterally without Rales/Wheezes/Rhonchi.   Cardiovascular: Regular rate and rhythm with normal S1/S2 without murmurs, rubs or gallops. Abdomen: Soft, non-tender, non-distended with normal bowel sounds. Ostomy noted. Musculoskeletal:  No clubbing, cyanosis or edema bilaterally. Skin: Skin color, texture, turgor normal.  No rashes or lesions. Neurologic:  Neurovascularly intact without any focal sensory/motor deficits. Cranial nerves: II-XII intact, grossly non-focal.  Psychiatric: Alert and oriented, thought content appropriate, normal insight  Capillary Refill: Brisk,< 3 seconds   Peripheral Pulses: +2 palpable, equal bilaterally       Labs:   Recent Labs     07/12/22  0545 07/12/22  0545 07/12/22  1124 07/13/22  0041 07/13/22  0531   WBC 17.6*  --   --   --  20.9*   HGB 6.7*   < > 6.6* 8.2* 8.1*   HCT 23.9*   < > 23.1* 28.0* 29.6*     --   --   --  310    < > = values in this interval not displayed. Recent Labs     07/12/22  0545 07/13/22  0531 07/14/22  0548   * 136 138   K 4.0 4.0 4.0    102 106   CO2 19* 16* 18*   BUN 99* 92* 92*   CREATININE 2.7* 3.0* 3.1*   CALCIUM 8.5 8.4* 8.6     No results for input(s): AST, ALT, BILIDIR, BILITOT, ALKPHOS in the last 72 hours. No results for input(s): INR in the last 72 hours. No results for input(s): Edna Mould in the last 72 hours. Microbiology:    Blood culture #1:   Lab Results   Component Value Date/Time    BC No growth-preliminary  07/11/2022 02:23 PM    BC No growth-preliminary  07/11/2022 02:23 PM       Blood culture #2:No results found for: Jessy Coats    Organism:  Lab Results   Component Value Date/Time    ORG gram positive bacilli 05/17/2022 11:43 AM         Lab Results   Component Value Date/Time    LABGRAM  02/22/2022 01:14 PM     Many segmented neutrophils observed. No epithelial cells observed. No bacteria seen.         MRSA culture only:No results found for: [facility]    Urine culture:   Lab Results   Component Value Date/Time    LABURIN No growth-preliminary  01/20/2022 09:45 PM    LABURIN [place] count: 1,000 CFU/mL  01/20/2022 09:45 PM

## 2022-07-14 NOTE — PLAN OF CARE
Problem: Chronic Conditions and Co-morbidities  Goal: Patient's chronic conditions and co-morbidity symptoms are monitored and maintained or improved  Outcome: Progressing  Flowsheets (Taken 7/14/2022 0817)  Care Plan - Patient's Chronic Conditions and Co-Morbidity Symptoms are Monitored and Maintained or Improved:   Monitor and assess patient's chronic conditions and comorbid symptoms for stability, deterioration, or improvement   Collaborate with multidisciplinary team to address chronic and comorbid conditions and prevent exacerbation or deterioration     Problem: Safety - Adult  Goal: Free from fall injury  Outcome: Progressing  Flowsheets (Taken 7/14/2022 1632)  Free From Fall Injury:   Instruct family/caregiver on patient safety   Based on caregiver fall risk screen, instruct family/caregiver to ask for assistance with transferring infant if caregiver noted to have fall risk factors  Note: Bed alarm and call light in place. Problem: ABCDS Injury Assessment  Goal: Absence of physical injury  Outcome: Progressing     Problem: Skin/Tissue Integrity  Goal: Absence of new skin breakdown  Description: 1. Monitor for areas of redness and/or skin breakdown  2. Assess vascular access sites hourly  3. Every 4-6 hours minimum:  Change oxygen saturation probe site  4. Every 4-6 hours:  If on nasal continuous positive airway pressure, respiratory therapy assess nares and determine need for appliance change or resting period.   Outcome: Progressing  Note: Q2 turns, wound care completed per shift     Problem: Discharge Planning  Goal: Discharge to home or other facility with appropriate resources  Outcome: Progressing  Flowsheets (Taken 7/14/2022 0817)  Discharge to home or other facility with appropriate resources:   Identify barriers to discharge with patient and caregiver   Arrange for needed discharge resources and transportation as appropriate   Identify discharge learning needs (meds, wound care, etc) Problem: Nutrition Deficit:  Goal: Optimize nutritional status  7/14/2022 1637 by Aleja Blas RN  Outcome: Progressing  Note: Pt educated on importance of nutrition and drinking intake. Pt encouraged and instructed to drink fluids/ water. IV hydration started on pt as ordered. 7/14/2022 1229 by Mary Galaviz RD, RIGOBERTO  Flowsheets (Taken 7/14/2022 1229)  Nutrient intake appropriate for improving, restoring, or maintaining nutritional needs:   Assess nutritional status and recommend course of action   Monitor oral intake, labs, and treatment plans   Recommend appropriate diets, oral nutritional supplements, and vitamin/mineral supplements   Recommend, monitor, and adjust tube feedings and TPN/PPN based on assessed needs     Problem: Pain  Goal: Verbalizes/displays adequate comfort level or baseline comfort level  Outcome: Progressing  Note: Pt medicated for pain as ordered     Problem: Safety - Adult  Goal: Free from fall injury  Outcome: Progressing  Flowsheets (Taken 7/14/2022 1632)  Free From Fall Injury:   Instruct family/caregiver on patient safety   Based on caregiver fall risk screen, instruct family/caregiver to ask for assistance with transferring infant if caregiver noted to have fall risk factors  Note: Bed alarm and call light in place. Problem: ABCDS Injury Assessment  Goal: Absence of physical injury  Outcome: Progressing     Problem: Skin/Tissue Integrity  Goal: Absence of new skin breakdown  Description: 1. Monitor for areas of redness and/or skin breakdown  2. Assess vascular access sites hourly  3. Every 4-6 hours minimum:  Change oxygen saturation probe site  4. Every 4-6 hours:  If on nasal continuous positive airway pressure, respiratory therapy assess nares and determine need for appliance change or resting period.   Outcome: Progressing  Note: Q2 turns, wound care completed per shift     Problem: Discharge Planning  Goal: Discharge to home or other facility with appropriate resources  Outcome: Progressing  Flowsheets (Taken 7/14/2022 0817)  Discharge to home or other facility with appropriate resources:   Identify barriers to discharge with patient and caregiver   Arrange for needed discharge resources and transportation as appropriate   Identify discharge learning needs (meds, wound care, etc)     Problem: Nutrition Deficit:  Goal: Optimize nutritional status  7/14/2022 1637 by Keke Thorne RN  Outcome: Progressing  Note: Pt educated on importance of nutrition and drinking intake. Pt encouraged and instructed to drink fluids/ water. IV hydration started on pt as ordered.   7/14/2022 1229 by Forrest Mckeon RD, RIGOBERTO  Flowsheets (Taken 7/14/2022 1229)  Nutrient intake appropriate for improving, restoring, or maintaining nutritional needs:   Assess nutritional status and recommend course of action   Monitor oral intake, labs, and treatment plans   Recommend appropriate diets, oral nutritional supplements, and vitamin/mineral supplements   Recommend, monitor, and adjust tube feedings and TPN/PPN based on assessed needs     Problem: Pain  Goal: Verbalizes/displays adequate comfort level or baseline comfort level  Outcome: Progressing  Note: Pt medicated for pain as ordered

## 2022-07-14 NOTE — PROGRESS NOTES
Gatherings with Friends and Family: Not on file    Attends Zoroastrian Services: Not on file    Active Member of Clubs or Organizations: Not on file    Attends Club or Organization Meetings: Not on file    Marital Status: Not on file   Intimate Partner Violence:     Fear of Current or Ex-Partner: Not on file    Emotionally Abused: Not on file    Physically Abused: Not on file    Sexually Abused: Not on file   Housing Stability:     Unable to Pay for Housing in the Last Year: Not on file    Number of Places Lived in the Last Year: Not on file    Unstable Housing in the Last Year: Not on file     Family History   Problem Relation Age of Onset    Diabetes Father     Arthritis Mother     COPD Mother     Diabetes Sister     Heart Disease Maternal Uncle     Breast Cancer Niece 36    Sleep Apnea Brother     Asthma Neg Hx     Birth Defects Neg Hx     Cancer Neg Hx     Depression Neg Hx     Early Death Neg Hx     Hearing Loss Neg Hx     High Blood Pressure Neg Hx     High Cholesterol Neg Hx     Kidney Disease Neg Hx     Learning Disabilities Neg Hx     Mental Illness Neg Hx     Mental Retardation Neg Hx     Miscarriages / Stillbirths Neg Hx     Stroke Neg Hx     Substance Abuse Neg Hx     Vision Loss Neg Hx     Other Neg Hx      No Known Allergies      Constitutional: Alert and oriented to person, place, time. No acute distress. Smiling  HEENT:   Head:         Normocephalic and atraumatic. Mucous membranes are dry  Eyes:         EOM are normal. No scleral icterus. Nose:    The external appearance of the nose is normal  Ears: The ears appear normal to external inspection. Cardiovascular:       Normal rate, regular rhythm. Pulmonary/Chest:  Normal respiratory rate and rhthym. No use of accessory muscles. Lungs diminished bilaterally  Abdominal:          Soft. No tenderness. Active bowel sounds.   Genitalia:    Padilla catheter draining hickman colored urine with cloudy sediment and white material in tubing  Neurological:    Alert and oriented. Labs:  WBC:    Lab Results   Component Value Date/Time    WBC 20.9 07/13/2022 05:31 AM     Hemoglobin/Hematocrit:    Lab Results   Component Value Date/Time    HGB 8.1 07/13/2022 05:31 AM    HCT 29.6 07/13/2022 05:31 AM     BMP:    Lab Results   Component Value Date/Time     07/14/2022 05:48 AM    K 4.0 07/14/2022 05:48 AM    K 4.1 03/14/2022 04:44 AM     07/14/2022 05:48 AM    CO2 18 07/14/2022 05:48 AM    BUN 92 07/14/2022 05:48 AM    LABALBU 1.9 04/16/2022 05:38 AM    CREATININE 3.1 07/14/2022 05:48 AM    CALCIUM 8.6 07/14/2022 05:48 AM    LABGLOM 15 07/14/2022 05:48 AM       Impression/Plan  1. Sepsis secondary to CAUTI- On Zosyn. Neph tube exchanged 7/13. Pt having tachycardia, lower bp, and low grade temp today. Exchange seaman catheter and obtain urine culture off new catheter. Labs and blood cultures ordered per hospitalist.  Appreciate assistance  2. L staghorn calculus-managed chronically with nephrostomy tube with routine exchanges. Follows with Dr. Urmila Weber. Plan every 2 month nephrostomy tube exchanges with admission prior to exchange for pre-procedural antibiotic therapy  3. Hypotension  4. Tachycardia  5. ALVIN on CKD requiring dialysis 2 x weekly for last 2-3 mos--per nephrology  6. Anemia  7. Dysphagia  8. Chronic HF  9. Decubitus ulcers with diverting colostomy  10. Anxiety      Appreciate consultants. Continue IV anbx. Exchange seaman catheter and obtain urine culture off new catheter. Follow labs and blood culture results. Discussed with Sandra MERA with Hospitalist.  ?  Slightly dry    L[facility] [place] - [place]  07/14/22 8:58 AM  Urology

## 2022-07-14 NOTE — PROGRESS NOTES
Comprehensive Nutrition Assessment    Type and Reason for Visit:  Reassess    Nutrition Recommendations/Plan:   Resume nocturnal TF as per ECF - Nepro carb steady at 55 ml/hr from 6p-6a. Flush 30 ml free water at start and stop of TF. Additional free water flush per MD if needed. Diet as per MD.  Consider SLP evaluation if trouble w/ swallowing observed (pt. On mechanical soft diet w/ thin liquids, 2000 ml FR at [facility]. Pt. Declines ONS. Recommend renal MVI. Malnutrition Assessment:  Malnutrition Status: At risk for malnutrition (Comment) (07/12/22 1326)    Context:  Chronic Illness     Findings of the 6 clinical characteristics of malnutrition:  Energy Intake:  No significant decrease in energy intake (patient has been receiving tube feeding since 11/24/21, tolerating at [facility] prior to admit)  Weight Loss:  Unable to assess (due to CHF/ hemodialysis)     Body Fat Loss:  No significant body fat loss     Muscle Mass Loss:  No significant muscle mass loss    Fluid Accumulation:  Unable to assess     Strength:  Not Performed    Nutrition Assessment:     Pt. With no improvement from a nutritional standpoint AEB minimal po intake - ok to resume nocturnal TF. At risk for further nutrition compromise r/t admit for preop antibiotics prior to nephrostomy tube exchange, hx obstructive staghorn calculus of left kidney, nephrostomy tube placement, patient with purulent drainage from left nephrostomy tube, hypotension, ALVIN on CKD stage III, hemodialysis patient, dysphagia, increased nutrient needs for wound healing, underlying medical condition (CHF, 1/27/22 diverting colostomy, depression, gout, OA). Nutrition Related Findings:      Wound Type:  (unstageable wounds to right and left posterior lower legs, right posterior heel, and left second toe; evolving DTPI to right hip and ischium; stage 4 sacrum)     Pt.  Report/Treatments/Miscellaneous: pt. Seen - states not eating; denies any nausea or abdominal pain; states \"I don't know\" when asked about the TF - denies nocturnal TF interfering w/ sleeping but states \"I'm tired\" when asked why she doesn't know; per FAHAD - ok to restart TF as per ECF; Nephrology following - last HD received 7/8  GI Status: BM noted 7/14  Pertinent Labs: 7/14: Glucose 93, BUN 92, Cr 3.1, Potassium 4.0, Sodium 138  Pertinent Meds: Folvite, ATB, Colace, Glycolax, Bumex, Pepcid, Zofran      Current Nutrition Intake & Therapies:    Average Meal Intake: 0%,%     ADULT DIET; Easy to Chew  ADULT TUBE FEEDING; PEG; Renal Formula; Cyclic; 55; 6:83 PM; 5:02 AM; 30; Other (specify); at start and stop of TF  Current Tube Feeding (TF) Orders:  · Feeding Route: PEG  · Formula: Renal Formula  · Schedule: Cyclic  · Feeding Regimen: Nepro at 55ml/ hour x 12 per day (6PM to 6AM)  · Additives/Modulars: None  · Water Flushes: Recommend 30ml water flush before and 30ml after; additional free water flush as per Doctor  · Current TF & Flush Orders Provides: ECF regimen - Nepro carb steady at 55 ml/hr from 6p-6a w/ 30 ml free water flush at start and stop of TF - provides 1168 kcals, 53 gm protein, 106 gm CHO, 16.5 gm fiber, 540 ml free water (480 TF, 60 flushes) in 720 ml volume (660 TF, 60 flushes)/24 hours  · Goal TF & Flush Orders Provides: 1168 kcals (~83-91% of estimated daily needs), 53 grams protein, 106 grams CHO, 16.5 grams fiber, 480ml water (540ml with flushes) in 660ml (720ml with flushes) per 24 hours      Anthropometric Measures:  Height: 4' 9\" (144.8 cm)  Ideal Body Weight (IBW): 85 lbs (39 kg)    Admission Body Weight: 141 lb 3.2 oz (64 kg) (7/11/22; no edema)  Current Body Weight: 141 lb 3.2 oz (64 kg) (7/11 no edema), 166.1 % IBW.     Current BMI (kg/m2): 30.5  Usual Body Weight:  (per EMR: 10/17/21 164#, 1/20/22 144# 13.5oz, 3/13/22 133# bedscale, 4/11/22 134#, 4/26/22 175# 11.3oz prior to dialysis; per ECF weights: 2/4/22 144#, 4/5/22 133#)     Weight Adjustment For:  (adjusted IBW for low stature: 93# (42kgm))                 BMI Categories: Obese Class 1 (BMI 30.0-34. 9)    Estimated Daily Nutrient Needs:  Energy Requirements Based On: Kcal/kg  Weight Used for Energy Requirements: Admission (64kgm on 7/11)  Energy (kcal/day): 3511-6998 kcals (20-22)  Weight Used for Protein Requirements: Ideal (42kgm)  Protein (g/day): 50-63 grams (1.2-1.5) pending renal HD/ wound status  Method Used for Fluid Requirements: Other (Comment)  Fluid (ml/day): per Nephrology    Nutrition Diagnosis:   · Inadequate oral intake related to inadequate protein-energy intake as evidenced by poor intake prior to admission,nutrition support - enteral nutrition      Nutrition Interventions:   Food and/or Nutrient Delivery: Continue Current Diet,Start Tube Feeding  Nutrition Education/Counseling: No recommendation at this time,Education initiated (7/13 Discussed need for nocturnal TF as per ECF.)  Coordination of Nutrition Care: Continue to monitor while inpatient       Goals:  Previous Goal Met: Progressing toward Goal(s)  Goals: Meet at least 75% of estimated needs,by next RD assessment       Nutrition Monitoring and Evaluation:      Food/Nutrient Intake Outcomes: Food and Nutrient Intake,Enteral Nutrition Intake/Tolerance  Physical Signs/Symptoms Outcomes: Biochemical Data,Fluid Status or Edema,Meal Time Behavior,Nutrition Focused Physical Findings,Skin,Weight,GI Status    Discharge Planning:     Too soon to determine     Coretta Gitelman, RD, LD  Contact: [phone]

## 2022-07-15 ENCOUNTER — HOSPITAL ENCOUNTER (OUTPATIENT)
Dept: WOUND CARE | Age: 72
Discharge: HOME | End: 2022-07-15

## 2022-07-15 ENCOUNTER — APPOINTMENT (OUTPATIENT)
Dept: CT IMAGING | Age: 72
DRG: 698 | End: 2022-07-15
Attending: UROLOGY
Payer: MEDICARE

## 2022-07-15 LAB
ALLEN TEST: POSITIVE
ANION GAP SERPL CALCULATED.3IONS-SCNC: 14 MEQ/L (ref 8–16)
BACTERIA: ABNORMAL
BASE EXCESS (CALCULATED): -7.6 MMOL/L (ref -2.5–2.5)
BILIRUBIN URINE: NEGATIVE
BLOOD, URINE: ABNORMAL
BUN BLDV-MCNC: 87 MG/DL (ref 7–22)
CALCIUM SERPL-MCNC: 8.3 MG/DL (ref 8.5–10.5)
CASTS: ABNORMAL /LPF
CASTS: ABNORMAL /LPF
CHARACTER, URINE: CLEAR
CHLORIDE BLD-SCNC: 103 MEQ/L (ref 98–111)
CO2: 17 MEQ/L (ref 23–33)
COLLECTED BY:: ABNORMAL
COLOR: YELLOW
CREAT SERPL-MCNC: 3 MG/DL (ref 0.4–1.2)
CRYSTALS: ABNORMAL
DEVICE: ABNORMAL
EKG ATRIAL RATE: 147 BPM
EKG ATRIAL RATE: 147 BPM
EKG P AXIS: 77 DEGREES
EKG P-R INTERVAL: 208 MS
EKG Q-T INTERVAL: 330 MS
EKG Q-T INTERVAL: 340 MS
EKG QRS DURATION: 84 MS
EKG QRS DURATION: 88 MS
EKG QTC CALCULATION (BAZETT): 495 MS
EKG QTC CALCULATION (BAZETT): 532 MS
EKG R AXIS: 51 DEGREES
EKG R AXIS: 94 DEGREES
EKG T AXIS: 43 DEGREES
EKG T AXIS: 64 DEGREES
EKG VENTRICULAR RATE: 135 BPM
EKG VENTRICULAR RATE: 147 BPM
EPITHELIAL CELLS, UA: ABNORMAL /HPF
ERYTHROCYTE [DISTWIDTH] IN BLOOD BY AUTOMATED COUNT: 18 % (ref 11.5–14.5)
ERYTHROCYTE [DISTWIDTH] IN BLOOD BY AUTOMATED COUNT: 62.6 FL (ref 35–45)
GFR SERPL CREATININE-BSD FRML MDRD: 15 ML/MIN/1.73M2
GLUCOSE BLD-MCNC: 159 MG/DL (ref 70–108)
GLUCOSE, URINE: 100 MG/DL
HCO3: 18 MMOL/L (ref 23–28)
HCT VFR BLD CALC: 31.1 % (ref 37–47)
HEMOGLOBIN: 8.2 GM/DL (ref 12–16)
KETONES, URINE: NEGATIVE
LEUKOCYTE EST, POC: ABNORMAL
MCH RBC QN AUTO: 25.4 PG (ref 26–33)
MCHC RBC AUTO-ENTMCNC: 26.4 GM/DL (ref 32.2–35.5)
MCV RBC AUTO: 96.3 FL (ref 81–99)
MISCELLANEOUS LAB TEST RESULT: ABNORMAL
NITRITE, URINE: NEGATIVE
O2 SATURATION: 92 %
PCO2: 34 MMHG (ref 35–45)
PH BLOOD GAS: 7.32 (ref 7.35–7.45)
PH UA: 5.5 (ref 5–9)
PLATELET # BLD: 312 THOU/MM3 (ref 130–400)
PMV BLD AUTO: 8.5 FL (ref 9.4–12.4)
PO2: 69 MMHG (ref 71–104)
POTASSIUM SERPL-SCNC: 3.8 MEQ/L (ref 3.5–5.2)
PROTEIN UA: 100 MG/DL
RBC # BLD: 3.23 MILL/MM3 (ref 4.2–5.4)
RBC URINE: > 200 /HPF
RENAL EPITHELIAL, UA: ABNORMAL
SODIUM BLD-SCNC: 134 MEQ/L (ref 135–145)
SOURCE, BLOOD GAS: ABNORMAL
SPECIFIC GRAVITY UA: 1.01 (ref 1–1.03)
UROBILINOGEN, URINE: 0.2 EU/DL (ref 0–1)
WBC # BLD: 14.1 THOU/MM3 (ref 4.8–10.8)
WBC UA: ABNORMAL /HPF
YEAST: ABNORMAL

## 2022-07-15 PROCEDURE — 96366 THER/PROPH/DIAG IV INF ADDON: CPT

## 2022-07-15 PROCEDURE — 6370000000 HC RX 637 (ALT 250 FOR IP): Performed by: INTERNAL MEDICINE

## 2022-07-15 PROCEDURE — 2580000003 HC RX 258: Performed by: UROLOGY

## 2022-07-15 PROCEDURE — 96376 TX/PRO/DX INJ SAME DRUG ADON: CPT

## 2022-07-15 PROCEDURE — 99233 SBSQ HOSP IP/OBS HIGH 50: CPT

## 2022-07-15 PROCEDURE — 87205 SMEAR GRAM STAIN: CPT

## 2022-07-15 PROCEDURE — 81001 URINALYSIS AUTO W/SCOPE: CPT

## 2022-07-15 PROCEDURE — 87186 SC STD MICRODIL/AGAR DIL: CPT

## 2022-07-15 PROCEDURE — 1200000003 HC TELEMETRY R&B

## 2022-07-15 PROCEDURE — 6370000000 HC RX 637 (ALT 250 FOR IP)

## 2022-07-15 PROCEDURE — 85027 COMPLETE CBC AUTOMATED: CPT

## 2022-07-15 PROCEDURE — 6360000002 HC RX W HCPCS

## 2022-07-15 PROCEDURE — 93005 ELECTROCARDIOGRAM TRACING: CPT

## 2022-07-15 PROCEDURE — 36415 COLL VENOUS BLD VENIPUNCTURE: CPT

## 2022-07-15 PROCEDURE — 87070 CULTURE OTHR SPECIMN AEROBIC: CPT

## 2022-07-15 PROCEDURE — 2500000003 HC RX 250 WO HCPCS

## 2022-07-15 PROCEDURE — 2580000003 HC RX 258

## 2022-07-15 PROCEDURE — 36600 WITHDRAWAL OF ARTERIAL BLOOD: CPT

## 2022-07-15 PROCEDURE — 99232 SBSQ HOSP IP/OBS MODERATE 35: CPT | Performed by: INTERNAL MEDICINE

## 2022-07-15 PROCEDURE — 93010 ELECTROCARDIOGRAM REPORT: CPT | Performed by: INTERNAL MEDICINE

## 2022-07-15 PROCEDURE — 96361 HYDRATE IV INFUSION ADD-ON: CPT

## 2022-07-15 PROCEDURE — 80048 BASIC METABOLIC PNL TOTAL CA: CPT

## 2022-07-15 PROCEDURE — 6360000002 HC RX W HCPCS: Performed by: UROLOGY

## 2022-07-15 PROCEDURE — 96375 TX/PRO/DX INJ NEW DRUG ADDON: CPT

## 2022-07-15 PROCEDURE — 87086 URINE CULTURE/COLONY COUNT: CPT

## 2022-07-15 PROCEDURE — 87077 CULTURE AEROBIC IDENTIFY: CPT

## 2022-07-15 PROCEDURE — 87075 CULTR BACTERIA EXCEPT BLOOD: CPT

## 2022-07-15 PROCEDURE — 82803 BLOOD GASES ANY COMBINATION: CPT

## 2022-07-15 PROCEDURE — 6360000002 HC RX W HCPCS: Performed by: FAMILY MEDICINE

## 2022-07-15 PROCEDURE — 6370000000 HC RX 637 (ALT 250 FOR IP): Performed by: UROLOGY

## 2022-07-15 RX ORDER — METOPROLOL TARTRATE 5 MG/5ML
2.5 INJECTION INTRAVENOUS ONCE
Status: DISCONTINUED | OUTPATIENT
Start: 2022-07-15 | End: 2022-07-15

## 2022-07-15 RX ADMIN — DAKIN'S SOLUTION 0.125% (QUARTER STRENGTH): 0.12 SOLUTION at 20:27

## 2022-07-15 RX ADMIN — MIDODRINE HYDROCHLORIDE 7.5 MG: 2.5 TABLET ORAL at 12:36

## 2022-07-15 RX ADMIN — SERTRALINE 25 MG: 50 TABLET, FILM COATED ORAL at 10:14

## 2022-07-15 RX ADMIN — SODIUM CHLORIDE: 9 INJECTION, SOLUTION INTRAVENOUS at 10:29

## 2022-07-15 RX ADMIN — DILTIAZEM HYDROCHLORIDE 2.5 MG/HR: 5 INJECTION, SOLUTION INTRAVENOUS at 14:30

## 2022-07-15 RX ADMIN — DAKIN'S SOLUTION 0.125% (QUARTER STRENGTH): 0.12 SOLUTION at 10:18

## 2022-07-15 RX ADMIN — ONDANSETRON 4 MG: 2 INJECTION INTRAMUSCULAR; INTRAVENOUS at 10:21

## 2022-07-15 RX ADMIN — SODIUM CHLORIDE, PRESERVATIVE FREE 10 ML: 5 INJECTION INTRAVENOUS at 10:06

## 2022-07-15 RX ADMIN — FAMOTIDINE 20 MG: 20 TABLET ORAL at 10:14

## 2022-07-15 RX ADMIN — SODIUM CHLORIDE, PRESERVATIVE FREE 10 ML: 5 INJECTION INTRAVENOUS at 20:26

## 2022-07-15 RX ADMIN — PIPERACILLIN AND TAZOBACTAM 2250 MG: 2; .25 INJECTION, POWDER, LYOPHILIZED, FOR SOLUTION INTRAVENOUS at 10:33

## 2022-07-15 RX ADMIN — FOLIC ACID 1 MG: 1 TABLET ORAL at 10:14

## 2022-07-15 RX ADMIN — DILTIAZEM HYDROCHLORIDE 2.5 MG/HR: 5 INJECTION, SOLUTION INTRAVENOUS at 14:21

## 2022-07-15 RX ADMIN — SODIUM CHLORIDE, PRESERVATIVE FREE 10 ML: 5 INJECTION INTRAVENOUS at 10:18

## 2022-07-15 RX ADMIN — MIDODRINE HYDROCHLORIDE 5 MG: 5 TABLET ORAL at 10:14

## 2022-07-15 RX ADMIN — FENTANYL CITRATE 25 MCG: 50 INJECTION, SOLUTION INTRAMUSCULAR; INTRAVENOUS at 00:43

## 2022-07-15 RX ADMIN — PIPERACILLIN AND TAZOBACTAM 2250 MG: 2; .25 INJECTION, POWDER, LYOPHILIZED, FOR SOLUTION INTRAVENOUS at 03:00

## 2022-07-15 RX ADMIN — PIPERACILLIN AND TAZOBACTAM 2250 MG: 2; .25 INJECTION, POWDER, LYOPHILIZED, FOR SOLUTION INTRAVENOUS at 20:22

## 2022-07-15 RX ADMIN — ACETAMINOPHEN 325MG 650 MG: 325 TABLET ORAL at 10:34

## 2022-07-15 ASSESSMENT — PAIN SCALES - GENERAL
PAINLEVEL_OUTOF10: 8
PAINLEVEL_OUTOF10: 10
PAINLEVEL_OUTOF10: 8

## 2022-07-15 ASSESSMENT — PAIN DESCRIPTION - LOCATION: LOCATION: COCCYX

## 2022-07-15 ASSESSMENT — PAIN DESCRIPTION - DESCRIPTORS: DESCRIPTORS: SHARP;SHOOTING

## 2022-07-15 ASSESSMENT — PAIN - FUNCTIONAL ASSESSMENT: PAIN_FUNCTIONAL_ASSESSMENT: PREVENTS OR INTERFERES SOME ACTIVE ACTIVITIES AND ADLS

## 2022-07-15 NOTE — FLOWSHEET NOTE
07/15/22 0825   Treatment Team Notification   Reason for Communication Evaluate   Team Member Name JEANNINE Davey   Treatment Team Role Advanced Practice Nurse   Method of Communication Secure Message   Response Waiting for response   Notification Time [id number]   Patient remains tachy this AM. HR jumping to 130's at times. Currently 118. Patient denies pain. Does not appear in distress. /58. Afebrile.     New order: EKG

## 2022-07-15 NOTE — PROGRESS NOTES
Hospitalist Progress Note    [facility]      Unit/Bed:5K-19/019-A    YOB: 1950    MRN: [id number]       Acct: [de-identified]     PCP: Meek Rhodes MD    Date of Admission: 7/11/2022    Assessment/Plan:    Sepsis    - Suspect possible CAUTI  - Had 2/4 SIRS criteria POA (leukocytosis, hypotension). Continues having fevers, tachycardia, hypotension, continues to have leukocytosis . Via chart review patient has had multiple admissions to the hospital for sepsis. Reportedly becomes septic after every nephrostomy tube exchanges. She has had many infections in her respiratory system, hap, ESBL UTIs, VRE, etc. patient has multiple sources for possible infection including chronic wounds (see media), G-tube with thick yellow/white discharge noted around stoma site, chronic indwelling Seaman catheter, nephrostomy tube exchanges. - Lactic acid initially elevated but down trended w/ IVF. Repeat lactic acid 2.3 (7/14). Received 1 L IVF overnight. Repeat lactic acid ordered. - PCT 1.29 (7/14), slowly down trending from 2.02 (POA). Concerned for sepsis but could also be reactive to #4. - Leukocytosis down trending from 20.4 (POA)   - S/p seaman catheter exchange 7/14 -> UA notable for 100 glucose, large blood, 100 protein, small leukocytes, negative nitrites, 25-50 WBCs, no bacteria. Urine cx sent and pending.   - A&A cx of Gtube discharge notable for few segmented neutrophils, few epithelial cells, many budding yeast   - BC X 2 preliminary result NGTD   - Continue Zosyn (initiated 7/11)   - Continuous IVF at rate 50 mL/hr    - ID consulted, appreciate recs    Chronic obstruction d/t left staghorn calculus   - S/p nephrostomy tube exchange 7/13/22   -Chronically by urology services with routine exchanges. Follows with Dr. Merritt Landrum.   Per urology note plan every 2-month nephrostomy tube exchanges with admission prior to exchange for preprocedural antibiotic therapy   - Urology following, appreciate recs    Hypotension   - Multifactorial   - Initially suspected to volume depletion. Received 500 cc bolus on 7/12 and had some improvement. S/p IVF for gentle hydration which was d/c by nephrology on 7/13. However patient is septic, which can be contributing factor. Also she is noted to be in atrial fibrillation with RVR which could also be contributing to hypotension.   -Increase midodrine to 7.5 mg from 5 mg 3 times daily.   - Restarted continuous IVF for gentle hydration   -Nephrology following to assist in fluid management given patient requiring intermittent HD    ALVIN vs CKD requiring dialysis 2 x weekly since April 2022   - Last dialysis 7/8 which she only had for a short time d/t severe abdominal pain per chart review. - Nephrology consulted, appreciate recs     Atrial fibrillation RVR   -Noted to be in atrial fibrillation with RVR 7/15. Ventricular rate between 120-165 bpm on telemetry monitor this morning. Twelve-lead ECG obtained notable for atrial fibrillation with RVR. Via chart review patient has been in atrial fibrillation RVR on prior hospitalizations. Hx of PAF. Last cardioversion 11/2021. Amiodarone was d/c on 11/8/21. She is supposed to be on Lopressor 25 mg twice daily, however given her hypotension we have been holding this. Suspect that this is contributing cause. However given that she is septic, and given her multiple comorbidities these could be contributing factors as well.    -We will increase her midodrine to 7.5 mg from 5 mg.   -Start diltiazem drip -> see note below 7/15   -Continuous telemetry   - Transfer to step down for closer monitoring.   - Cardiology consulted, appreciate recs    Dysphagia, hx of   - Noted to be on mechanical soft diet with thin liquids at [facility]   - SLP consulted, appreciate recs    Chronic HFpEF   - Follows Dr. Alia Mathews  - Limited Echo (1/21/22) notable for EF 60%    - Complete 2D echo (10/28/21) notable for EF 60-65%, IVC dilated w/ resp pahsic changes, CVP ~10-15 mmHG, trace TR. Noted to have    - Daily weights, strict I&O's   - Holding BB given hypotension and Bumex given hypotension. Will need to resume. Continue midodrine 7.5 mg for now. Stage IV Decubitus ulcers, multiple leg wounds   -Underwent diverting colostomy 1/27/2022   - See media for pictures of wounds   -Wound care consulted, appreciate recs    Mild hyponatremia   - Likely secondary to renal dysfunction. Improving with IVF. - Nephrology following. Metabolic acidosis   - CO2 17 today. ABG notable for pH 7.32, PCO2 34, PO2 69, bicarb 18, base excess -7.6. Nephrology following. Anxiety/Depression   - Continue zoloft    Pulmonary artery hypertension   - Follows at [facility] on [address] 10/29/21 which noted PAP 82/31 with a mean of 50 and PCWP 27 mmHG,   - Echo 5/4/21 which noted RVSP 45 mmHg    Chronic normocytic hypochromic anemia   - On PARRIS as outpatient. - Was noted to be between 8-9.5 or prior admissions back in January/February of this year. Currently on this admission has been in hemoglobin range ~ 7-8.5, with occasional drops to 6.6-6.9.    - S/p 1 unit PRBCs 7/12   - Holding heparin/AC at this time d/t decreasing H&H's. Denies melena/hematochezia. No external bleeding noted. Brown stool noted in ostomy bag. SCDs for DVT prophylaxis   - Currently H&H stable. Transfuse for hemoglobin < 7 g/dL   - Trend and monitor   - Iron studies ordered. Recommend holding ferrous sulfate if needed as she has active infection    Gout, hx of:   - No reports of gout attacks or deposits currently. Monitor. At risk for malnutrition   - Dietician consulted and following.    - Resume nocturnal TF as per ECF. Declines ONS. Recommending renal MVI, will defer to nephrology services. Hx of tobacco abuse     Mild BARBER: Follows at [facility] w/ pulmonology. Noncompliant with CPAP    Obesity:   - BMI 0.56 kg/m². - Discussed and educated on lifestyle modifications.       Expected discharge date: pending clinical course    Disposition:    [] Home       [] TCU       [] Rehab       [] Psych       [x] SNF        [] [place]       [] Other-    Chief Complaint: Nephrostomy tube exchange    Hospital Course: Per consult note documented 7/11/22: Blank Pepe 73 y.o. female who we are asked to see/evaluate by Allen Holly MD for medical management of hypotension. Patient directly admitted by urology service for preop antibiotics prior to nephrostomy tube exchange. Patient is chronically ill, has pain for nutrition, diverting colostomy bag for stage III decubitus ulcer, nephrostomy tubes. Patient has history of obstructive staghorn calculus of left kidney, was not a candidate for stone treatment and nephrostomy tube was placed. Patient with purulent drainage from left nephrostomy tube, concentrated yellow urine in Padilla bag. Patient has multiple large wounds, wound care is following. Patient seen with family at bedside. No acute complaints at this time. She denies fever/chills, chest pain, shortness of breath, abdominal pain, N/V/D. Denies pain at nephrostomy tube site. Hospitalist will continue to follow. \"     7/12: Patient resting comfortably. Alert to verbal stimuli, nods yes/no appropriately to questions. Denies f/c, sob, cp, abd pain, nvd. Hgb 6.7 today, 1 unit PRBC ordered. No signs of bleeding. 7/13: Patient resting in bed s/p neph tube exchange. Patient appears to be doing well, voiding yellow urine. Patient denies abdominal pain, nausea, vomiting, chest pain, shortness of breath. Patient's BP remains soft,    7/14: Patient resting in no distress, persistent hypotension, and T max 100.1 this AM patient reports that she feels great. She does continue to state that she is simply \"thirsty\". Denies any concerns, no cp, sob, abd pain, n/v, or any other concerns. Will continue with IV Zosyn pending final urine cx- to be obtained one new cath is placed.      7/15/22: Resumed care of patient today. Patient resting in bed, chronically ill in appearance, in no apparent distress. Was noted to be febrile overnight to 100.5 but currently is afebrile. She is noted to be tachycardic, hypotensive but otherwise stable. Received report from night shift hospitalist that patient was noted to be tachycardic and received 1 L IV fluid bolus overnight. When reviewing patient's telemetry she was in a irregular irregular rhythm that was fast.  Appeared to be atrial fibrillation with RVR. Twelve-lead ECG obtained, confirmed atrial fibrillation RVR. Ventricular rate fluctuating between 120s-165 BPM.  Patient initially had good blood pressure documented at 118/58 this morning. Was going to trial her on oral diltiazem with small IV push dose 2.5 mg Lopressor. Instructed nursing staff to obtain manual blood pressure prior to medication administration. Manual blood pressure was noted to be 90/54. Considered amiodarone but given her ALVIN versus CKD requiring intermittent dialysis this was likely poor choice. Decision made to start patient on diltiazem drip 2.5 mg with no titration, transfer patient to stepdown for closer monitoring given significant risk factors of sepsis, multiple sources of infection, significant medical history, and new onset A. fib RVR. Her midodrine was increased to 7.5 mg to try to compensate for her hypotension that appears to be chronic in nature. However given her sepsis and atrial fibrillation with RVR hard to say that this is not contributing component to hypotension. While in stepdown, patient's blood pressures remained stable but her heart rate was not improving much. She was still in RVR fluctuating between 120s-140s bpm.  Decision made to titrate her diltiazem drip accordingly given that her BPs have been consistently above 173 mmHg systolically. Nursing staff notified to keep a close eye on her blood pressures to maintain systolic BP greater than 90 mmHg.   Cardiology has been consulted for new onset A. fib RVR, infectious disease consulted given multiple sources of possible infection. She has received IV antibiotics before in the past has been sent to ICU for sepsis. Dr. Latha Dodd consulted to help distinguish what is colonization given all her history and cultures versus what might be true source of infection. Long discussion with the patient at the bedside this morning about her CODE STATUS. Given her chronic comorbidities, recurrent sepsis, and new arising complications discussed with patient about limited x4 as if she were to code she would not likely survive ventilator. Also informed patient that I had long discussion with Primitivo Barcenas from urology who informed me the reason urology services has not taken patient to the operating room to remove staghorn calculi as because she would not survive anesthesia. Patient stating that she is doing \"just fine and I have defied the odds. \"  Ever Aid she wants to remain a full code at this time. She is fully alert and oriented x4 and of sound body and mind. Fully understands what full code means as this was explained to her. Palliative care is following, will reevaluate with patient. Did discuss this patient's case with primary service urology. LANE Casillas requested the hospitalist service resume primary role given new onset A. fib RVR, sepsis with minimal improvement. Agreed to take patient as primary service at this time. I did discuss this patient's case with my collaborating physician Dr. Michelle Matias. Subjective (past 24 hours):   Did report having some abdominal pain this morning around her G tube site rated 5/10. Will obtain CT abd/pelv  Denies chest pain, shortness of breath at rest, dyspnea on exertion, palpitations, dizziness, lightheadedness, nausea, vomiting, diarrhea, fever, chills. States she is feeling a lot better this morning. She states that she is eating and drinking okay.   She denies weakness, fatigue. Medications:  Reviewed    Infusion Medications    sodium chloride 50 mL/hr at 07/14/22 1200    sodium chloride      sodium chloride       Scheduled Medications    piperacillin-tazobactam  2,250 mg IntraVENous Q8H    midodrine  5 mg Oral TID WC    [Held by provider] bumetanide  1 mg Oral Daily    famotidine  20 mg PEG Tube Daily    folic acid  1 mg Oral Daily    [Held by provider] metoprolol tartrate  25 mg Oral BID    sertraline  25 mg Oral Daily    sodium chloride flush  10 mL IntraCATHeter TID    sodium hypochlorite   Irrigation BID    sodium chloride flush  5-40 mL IntraVENous 2 times per day    [Held by provider] heparin (porcine)  5,000 Units SubCUTAneous 3 times per day     PRN Meds: sodium chloride, acetaminophen, docusate sodium, ipratropium-albuterol, melatonin, sodium chloride flush, sodium chloride, ondansetron **OR** ondansetron, polyethylene glycol      Intake/Output Summary (Last 24 hours) at 7/15/2022 0710  Last data filed at 7/14/2022 1800  Gross per 24 hour   Intake 0 ml   Output 260 ml   Net -260 ml       Diet:  ADULT DIET; Easy to Chew  ADULT TUBE FEEDING; PEG; Renal Formula; Cyclic; 55; 9:43 PM; 2:51 AM; 30; Other (specify); at start and stop of TF    Exam:  BP (!) 107/56   Pulse (!) 110   Temp 97.9 °F (36.6 °C) (Oral)   Resp 18   Ht 4' 9\" (1.448 m)   Wt 141 lb 3.2 oz (64 kg)   SpO2 95%   BMI 30.56 kg/m²     General appearance: No apparent distress, appears older than stated age and cooperative. Chronically ill-appearing  HEENT: Pupils equal, round, and reactive to light. Conjunctivae/corneas clear. Neck: Supple, with full range of motion. No jugular venous distention. Trachea midline. Respiratory:  Normal respiratory effort, able to speak full clear sentences. Clear to auscultation, bilaterally without Rales/Wheezes/Rhonchi. Cardiovascular: Fast irregular rate and irregular rhythm with normal S1/S2 without murmurs, rubs or gallops.   Abdomen: Soft, non-tender, non-distended with normal bowel sounds. Ostomy to left lower quadrant with formed/liquid brown stool in ostomy bag.  G-tube noted with thick white discharge coming from around G-tube exit site. : Nephrostomy tube noted. Padilla catheter noted. Musculoskeletal: passive and active ROM x 4 extremities. Skin: Skin color, texture, turgor normal.  See media for sacral wounds, left posterior wound, left hip wound, left second and third toes, right posterior heel, right lower extremity posterior leg. Neurologic:  Neurovascularly intact without any focal sensory/motor deficits. Cranial nerves: II-XII intact, grossly non-focal.  Psychiatric: Alert and oriented, thought content appropriate, normal insight  Capillary Refill: Brisk,< 3 seconds   Peripheral Pulses: +2 palpable, equal bilaterally       Labs:   Recent Labs     07/13/22  0531 07/14/22  1442 07/15/22  0539   WBC 20.9* 19.3* 14.1*   HGB 8.1* 8.2* 8.2*   HCT 29.6* 28.8* 31.1*    355 312     Recent Labs     07/14/22  0548 07/14/22  2116 07/15/22  0539    135 134*   K 4.0 3.7 3.8    103 103   CO2 18* 17* 17*   BUN 92* 89* 87*   CREATININE 3.1* 3.3* 3.0*   CALCIUM 8.6 8.7 8.3*   PHOS  --  5.8*  --      No results for input(s): AST, ALT, BILIDIR, BILITOT, ALKPHOS in the last 72 hours. No results for input(s): INR in the last 72 hours. No results for input(s): Mary Reynoldston in the last 72 hours. Microbiology:      Urinalysis:      Lab Results   Component Value Date/Time    NITRU NEGATIVE 04/12/2022 06:30 AM    WBCUA 50-75W/CLUMPS 04/12/2022 06:30 AM    BACTERIA FEW 04/12/2022 06:30 AM    RBCUA > 200 04/12/2022 06:30 AM    BLOODU LARGE 04/12/2022 06:30 AM    SPECGRAV 1.014 01/20/2022 09:45 PM    GLUCOSEU NEGATIVE 04/12/2022 06:30 AM       Radiology:  No results found.     DVT prophylaxis: [] Lovenox                                 [x] SCDs                                 [] SQ Heparin                                 [x] Encourage ambulation           [] Already on Anticoagulation     Code Status: Full Code    PT/OT Eval Status: Pending recommendations    Tele:   [x] yes             [] no    Atrial fibrillation with RVR and occasional PVC.      Active Hospital Problems    Diagnosis Date Noted    Staghorn calculus [N20.0] 07/11/2022     Priority: Medium    Urinary tract infection associated with nephrostomy catheter ([facility] [address] [G99.760O, N39.0]      Priority: Medium    Hyponatremia [E87.1]      Priority: Medium       Electronically signed by LANE Velasquez CNP on 7/15/2022 at 7:10 AM

## 2022-07-15 NOTE — PROGRESS NOTES
Initial Evaluation          Patient:   Mary Ferrara  YOB: 1950  Age:  73 y.o. Room:  [facility]19/019-  MRN:  [id number]   Acct: [de-identified]    Date of Admission:  7/11/2022  8:49 AM  Date of Service:  7/15/2022  Completed By:  Fredrick Dolan RN                 Reason for Palliative Care Evaluation:-             [x] Code Status Discussion              [x] Goals of Care              [] Pain/Symptom Management               [] Emotional Support              [] Other:   Pt was direct admit from urology for UTI and nephrostomy tube exchange. Pt has staghorn calculus but is not surgical candidate. PMH includes HTN, PEG tube for dysphagia, s/p diverting colostomy for coccyx wound, pulmonary HTN, as well as pt is on dialysis. Pt currently being treated for at [facility], developed this morning. Current Issues:-  []  Pain  []  Fatigue  [x]  Nausea-zofran given   []  Anxiety  []  Depression  []  Shortness of Breath  []  Constipation  []  Appetite  []  Other:             Advance Directives:-  [] [address] [place] [address]  DNR Form  [] Living Will  [x] Medical POA             Current Code Status:-  [x] Full Resuscitation  [] DNR-Comfort Care-Arrest  [] DNR-Comfort Care       [] Limited Resuscitation             [] No CPR            [] No shock            [] No ET intubation/reintubation            [] No resuscitative medications            [] Other limitation:              Palliative Performance Status:          [] 60%  Ambulation reduced; Significant disease;Can't do hobbies/housework; intake normal or reduced; occasional assist; LOC full/confusion        [] 50%  Mainly sit/lie; Extensive disease; Can't do any work; Considerable assist; intake normal or reduced; LOC full/confusion        [x] 40%  Mainly in bed; Extensive disease; Mainly assist; intake normal or reduced; LOC full/confusion         [] 30%  Bed Bound; Extensive disease;  Total care; intake reduced; LOC full/confusion        [] 20%  Bed Bound; Extensive disease; Total care; intake minimal; Drowsy/coma        [] 10%  Bed Bound; Extensive disease; Total care; Mouth care only; Drowsy/coma        [] 0  Death        Goals of care evaluation:-        The patient goals of care are to provide comfort care/supportive services/palliation & relieve suffering:  Goals of care discussed with:  [x] Patient independently  [] Patient and Family  [] Family or Healthcare DPOA independently  [] Unable to discuss with patient, family/DPOA not present         Family/Patient Discussion:  Pt resting quietly in bed ,awake and alert, oriented to all spheres. Palliative care introduced, condition discussed. Pt states that she is actually feeling much better than when she came in, currently is nauseated but is receiving zofran. Advance care planning discussed, including review of DPOA on file, pt endorses that her sister, Maribel Reed, is her DPOA. Code status options explained, along with what is entailed with each level. Discussed possible complications of rib fractures, brain and organ damage associated with resuscitative measures. All questions answered and pt states understanding. Pt endorses that she has never thought about this issue before. Pt requests to continue with Full code status, states understanding that she only needs let physician know if she ever wants care limitations in place. Pt states no further questions or needs. Emotional support and encouragement given. Pt's nurse,NOAM Carr was in room during discussion for update. Secure text sent to Jemima Mcdaniel for update her. Plan/Follow-Up:  Continue with current plan of care. Palliative care will remain available as needed, floor to notify PRN for further needs.      Electronically signed by Fay Maldonado RN on 7/15/2022 at 10:39 AM           Palliative Care Office: [phone]

## 2022-07-15 NOTE — PROGRESS NOTES
[facility]   Nephrology progress note  7/15/2022, 10:57 AM      Pt Name:    Jamie Pereira  MRN:     [id number]     YOB: 1950  Admit Date:    7/11/2022  8:49 AM    Chief Complaint: Nephrology following for  Acute kidney injury needing hemodialysis  .     Subjective:  Patient seen and examined  No chest pain or shortness of breath  Says she is overall feeling okay  Denies any pain anywhere    Objective:  24HR INTAKE/OUTPUT:      Intake/Output Summary (Last 24 hours) at 7/15/2022 1057  Last data filed at 7/15/2022 1018  Gross per 24 hour   Intake 10 ml   Output 260 ml   Net -250 ml        Admission weight: 141 lb 3.2 oz (64 kg)  Wt Readings from Last 3 Encounters:   07/11/22 141 lb 3.2 oz (64 kg)   06/24/22 140 lb (63.5 kg)   05/31/22 140 lb (63.5 kg)        Vitals :   Vitals:    07/14/22 2100 07/14/22 2300 07/15/22 0352 07/15/22 0819   BP:  (!) 112/56 (!) 107/56 (!) 118/58   Pulse:  (!) 116 (!) 110 (!) 118   Resp:  18 18 20   Temp: (!) 100.5 °F (38.1 °C) 99 °F (37.2 °C) 97.9 °F (36.6 °C) 98 °F (36.7 °C)   TempSrc: Rectal Oral Oral Oral   SpO2:  94% 95% 95%   Weight:       Height:           Physical examination  General Appearance: Cachectic ill nourished no distress  Mouth/Throat:  Oral mucosa moist  Neck:  Supple, no JVD  Lungs:  Breath sounds: clear  Heart[de-identified]  S1,S2 heard  Abdomen:  Soft, non - tender  Musculoskeletal:  Edema -no significant edema noted    Medications:  Infusion:    sodium chloride 50 mL/hr at 07/15/22 1029    sodium chloride      sodium chloride       Meds:    dilTIAZem  30 mg Oral 4 times per day    metoprolol  2.5 mg IntraVENous Once    piperacillin-tazobactam  2,250 mg IntraVENous Q8H    midodrine  5 mg Oral TID WC    [Held by provider] bumetanide  1 mg Oral Daily    famotidine  20 mg PEG Tube Daily    folic acid  1 mg Oral Daily    [Held by provider] metoprolol tartrate  25 mg Oral BID    sertraline  25 mg Oral Daily    sodium chloride flush  10 mL IntraCATHeter TID    sodium hypochlorite   Irrigation BID    sodium chloride flush  5-40 mL IntraVENous 2 times per day    [Held by provider] heparin (porcine)  5,000 Units SubCUTAneous 3 times per day     Meds prn: sodium chloride, acetaminophen, docusate sodium, ipratropium-albuterol, melatonin, sodium chloride flush, sodium chloride, ondansetron **OR** ondansetron, polyethylene glycol     Lab Data :  CBC:   Recent Labs     07/13/22  0531 07/14/22  1442 07/15/22  0539   WBC 20.9* 19.3* 14.1*   HGB 8.1* 8.2* 8.2*   HCT 29.6* 28.8* 31.1*    355 312       CMP:  Recent Labs     07/14/22  0548 07/14/22  2116 07/15/22  0539    135 134*   K 4.0 3.7 3.8    103 103   CO2 18* 17* 17*   BUN 92* 89* 87*   CREATININE 3.1* 3.3* 3.0*   GLUCOSE 93 199* 159*   CALCIUM 8.6 8.7 8.3*   MG  --  1.6  --    PHOS  --  5.8*  --        Hepatic: No results for input(s): LABALBU, AST, ALT, ALB, BILITOT, ALKPHOS in the last 72 hours. Assessment and Plan:  Renal -acute kidney injury needing hemodialysis  Based on the BMP her creatinine is 3.0 currently on IV fluids and creatinine is up to 3.0 BUN around 87  Currently getting IV fluids as she is having very poor p.o. intake  No acute need for dialysis today     Electrolytes -mild hyponatremia secondary to renal dysfunction improved with IV fluids  Mild metabolic acidosis with some elevated lactic acid improved  Leukocytosis-getting some antibiotics  Chronic bilateral obstruction due to staghorn calculi. Status post nephrostomy tube placement 7/13/2022  Hypotension status post IV fluids currently better. Also started on midodrine 5 mg 3 times a day  Meds reviewed and discussed with patient       Lisa Gamez MD  [facility]    This report has been created using voice recognition software.  It may contain minor errors which are inherent in voice recognition technology

## 2022-07-15 NOTE — PLAN OF CARE
Problem: Chronic Conditions and Co-morbidities  Goal: Patient's chronic conditions and co-morbidity symptoms are monitored and maintained or improved  7/15/2022 0446 by Randy Fothergill, RN  Outcome: Progressing  7/14/2022 1637 by Godfrey Halsted, RN  Outcome: Progressing  Flowsheets (Taken 7/14/2022 0817)  Care Plan - Patient's Chronic Conditions and Co-Morbidity Symptoms are Monitored and Maintained or Improved:   Monitor and assess patient's chronic conditions and comorbid symptoms for stability, deterioration, or improvement   Collaborate with multidisciplinary team to address chronic and comorbid conditions and prevent exacerbation or deterioration     Problem: Safety - Adult  Goal: Free from fall injury  7/15/2022 0446 by Randy Fothergill, RN  Outcome: Progressing  7/14/2022 1637 by Godfrey Halsted, RN  Outcome: Progressing  Flowsheets (Taken 7/14/2022 1632)  Free From Fall Injury:   Edgarelytyron Evan family/caregiver on patient safety   Based on caregiver fall risk screen, instruct family/caregiver to ask for assistance with transferring infant if caregiver noted to have fall risk factors  Note: Bed alarm and call light in place. Problem: ABCDS Injury Assessment  Goal: Absence of physical injury  7/15/2022 0446 by Randy Fothergill, RN  Outcome: Progressing  7/14/2022 1637 by Godfrey Halsted, RN  Outcome: Progressing     Problem: Skin/Tissue Integrity  Goal: Absence of new skin breakdown  Description: 1. Monitor for areas of redness and/or skin breakdown  2. Assess vascular access sites hourly  3. Every 4-6 hours minimum:  Change oxygen saturation probe site  4. Every 4-6 hours:  If on nasal continuous positive airway pressure, respiratory therapy assess nares and determine need for appliance change or resting period.   7/15/2022 0446 by Randy Fothergill, RN  Outcome: Progressing  7/14/2022 1637 by Godfrey Halsted, RN  Outcome: Progressing  Note: Q2 turns, wound care completed per shift     Problem: Discharge Planning  Goal: Discharge to home or other facility with appropriate resources  7/15/2022 0446 by Gabby Joy RN  Outcome: Progressing  7/14/2022 1637 by Keturah Vallejo RN  Outcome: Progressing  Flowsheets (Taken 7/14/2022 0817)  Discharge to home or other facility with appropriate resources:   Identify barriers to discharge with patient and caregiver   Arrange for needed discharge resources and transportation as appropriate   Identify discharge learning needs (meds, wound care, etc)     Problem: Nutrition Deficit:  Goal: Optimize nutritional status  7/15/2022 0446 by Gabby Joy RN  Outcome: Progressing  7/14/2022 1637 by Keturah Vallejo RN  Outcome: Progressing  Note: Pt educated on importance of nutrition and drinking intake. Pt encouraged and instructed to drink fluids/ water. IV hydration started on pt as ordered.      Problem: Pain  Goal: Verbalizes/displays adequate comfort level or baseline comfort level  7/15/2022 0446 by Gabby Joy RN  Outcome: Progressing  7/14/2022 1637 by Keturah Vallejo RN  Outcome: Progressing  Note: Pt medicated for pain as ordered

## 2022-07-15 NOTE — CARE COORDINATION
7/15/22, 11:59 AM EDT    DISCHARGE ON GOING EVALUATION    Magalis Oneal day: 0  Location: 1X-21/122-L Reason for admit: Staghorn calculus [N20.0]   Procedure: 7/13:  Lt. Nephrostomy tube exchange  Barriers to Discharge: Cardizem IV started today, IV Zosyn,Padilla, PEG with TF, colostomy, Nephro tube, PT/OT, ID consult, Cardiology consult, Palliative saw-would like to remain full code. PCP: Ese Powell MD   %  Patient Goals/Plan/Treatment Preferences: SW following.  Plan to return to [place] [id number]

## 2022-07-15 NOTE — PROGRESS NOTES
Urology Progress Note    Chief Complaint: nephrostomy tube exchange    Subjective:     Crystal Maciel reports \"I feel better today! \". Denies pain at time of exam.    Padilla with hickman colored urine with cloudy sediment and white material in the tubing.   Neph tube draining light pink tinged urine    Having Afib now  Persistent hypotension  Low grade temps continue  Drainage at j tube site    Discussed with medicine, they graciously accepted role of primary  Urology will follow       Vitals:  BP (!) 90/54 Comment: celina reid made aware  Pulse (!) 118   Temp 98 °F (36.7 °C) (Oral)   Resp 20   Ht 4' 9\" (1.448 m)   Wt 141 lb 3.2 oz (64 kg)   SpO2 95%   BMI 30.56 kg/m²   Temp  Av.9 °F (37.2 °C)  Min: 97.9 °F (36.6 °C)  Max: 100.5 °F (38.1 °C)    Intake/Output Summary (Last 24 hours) at 7/15/2022 1140  Last data filed at 7/15/2022 1018  Gross per 24 hour   Intake 10 ml   Output 260 ml   Net -250 ml         Social History     Socioeconomic History    Marital status: Single     Spouse name: Not on file    Number of children: 0    Years of education: Not on file    Highest education level: Not on file   Occupational History    Not on file   Tobacco Use    Smoking status: Never    Smokeless tobacco: Never   Vaping Use    Vaping Use: Never used   Substance and Sexual Activity    Alcohol use: No    Drug use: No    Sexual activity: Not Currently   Other Topics Concern    Not on file   Social History Narrative    Not on file     Social Determinants of Health     Financial Resource Strain: Low Risk     Difficulty of Paying Living Expenses: Not very hard   Food Insecurity: No Food Insecurity    Worried About Running Out of Food in the Last Year: Never true    Ran Out of Food in the Last Year: Never true   Transportation Needs: Not on file   Physical Activity: Not on file   Stress: Not on file   Social Connections: Not on file   Intimate Partner Violence: Not on file   Housing Stability: Not on file     Family History Problem Relation Age of Onset    Diabetes Father     Arthritis Mother     COPD Mother     Diabetes Sister     Heart Disease Maternal Uncle     Breast Cancer Niece 36    Sleep Apnea Brother     Asthma Neg Hx     Birth Defects Neg Hx     Cancer Neg Hx     Depression Neg Hx     Early Death Neg Hx     Hearing Loss Neg Hx     High Blood Pressure Neg Hx     High Cholesterol Neg Hx     Kidney Disease Neg Hx     Learning Disabilities Neg Hx     Mental Illness Neg Hx     Mental Retardation Neg Hx     Miscarriages / Stillbirths Neg Hx     Stroke Neg Hx     Substance Abuse Neg Hx     Vision Loss Neg Hx     Other Neg Hx      No Known Allergies      Constitutional: Alert and oriented to person, place, time. No acute distress. Smiling  HEENT:   Head:         Normocephalic and atraumatic. Mucous membranes are dry  Eyes:         EOM are normal. No scleral icterus. Nose:    The external appearance of the nose is normal  Ears: The ears appear normal to external inspection. Cardiovascular:       Normal rate, regular rhythm. Pulmonary/Chest:  Normal respiratory rate and rhthym. No use of accessory muscles. Lungs diminished bilaterally  Abdominal:          Soft. No tenderness. Active bowel sounds. Genitalia:    Padilla catheter draining hickman colored urine with cloudy sediment and white material in tubing  Neurological:    Alert and oriented.      Labs:  WBC:    Lab Results   Component Value Date/Time    WBC 14.1 07/15/2022 05:39 AM     Hemoglobin/Hematocrit:    Lab Results   Component Value Date/Time    HGB 8.2 07/15/2022 05:39 AM    HCT 31.1 07/15/2022 05:39 AM     BMP:    Lab Results   Component Value Date/Time     07/15/2022 05:39 AM    K 3.8 07/15/2022 05:39 AM    K 4.1 03/14/2022 04:44 AM     07/15/2022 05:39 AM    CO2 17 07/15/2022 05:39 AM    BUN 87 07/15/2022 05:39 AM    LABALBU 1.9 04/16/2022 05:38 AM    CREATININE 3.0 07/15/2022 05:39 AM    CALCIUM 8.3 07/15/2022 05:39 AM    LABGLOM 15 07/15/2022 05:39 AM       Impression/Plan  Sepsis secondary to CAUTI- On Zosyn. Neph tube exchanged 7/13. Pt having tachycardia, lower bp, and low grade temp today. Exchange seaman catheter and obtain urine culture off new catheter. Labs and blood cultures ordered per hospitalist.  Appreciate assistance  L staghorn calculus-managed chronically with nephrostomy tube with routine exchanges. Follows with Dr. Ivania Batista.   Plan every 2 month nephrostomy tube exchanges with admission prior to exchange for pre-procedural antibiotic therapy  Hypotension  Tachycardia  ALVIN on CKD requiring dialysis 2 x weekly for last 2-3 mos--per nephrology  Anemia  Dysphagia  Chronic HF  Decubitus ulcers with diverting colostomy  Anxiety    Having Afib now - transferred to 3B  Persistent hypotension  Low grade temps continue  Drainage at j tube site    Discussed with medicine, they graciously accepted role of primary  Urology will follow    LANE Lara - CNP  07/15/22 11:40 AM  Urology

## 2022-07-16 ENCOUNTER — APPOINTMENT (OUTPATIENT)
Dept: GENERAL RADIOLOGY | Age: 72
DRG: 698 | End: 2022-07-16
Attending: UROLOGY
Payer: MEDICARE

## 2022-07-16 PROBLEM — N18.4 CKD (CHRONIC KIDNEY DISEASE), STAGE IV (HCC): Status: ACTIVE | Noted: 2022-07-16

## 2022-07-16 LAB
ABSOLUTE RETIC #: 97 THOU/MM3 (ref 20–115)
ANION GAP SERPL CALCULATED.3IONS-SCNC: 12 MEQ/L (ref 8–16)
BUN BLDV-MCNC: 87 MG/DL (ref 7–22)
CALCIUM SERPL-MCNC: 8.5 MG/DL (ref 8.5–10.5)
CHLORIDE BLD-SCNC: 107 MEQ/L (ref 98–111)
CO2: 17 MEQ/L (ref 23–33)
CREAT SERPL-MCNC: 3 MG/DL (ref 0.4–1.2)
ERYTHROCYTE [DISTWIDTH] IN BLOOD BY AUTOMATED COUNT: 18.1 % (ref 11.5–14.5)
ERYTHROCYTE [DISTWIDTH] IN BLOOD BY AUTOMATED COUNT: 62.7 FL (ref 35–45)
FERRITIN: 1092 NG/ML (ref 10–291)
FOLATE: > 20 NG/ML (ref 4.8–24.2)
GFR SERPL CREATININE-BSD FRML MDRD: 15 ML/MIN/1.73M2
GLUCOSE BLD-MCNC: 143 MG/DL (ref 70–108)
HCT VFR BLD CALC: 30 % (ref 37–47)
HEMOGLOBIN: 8.2 GM/DL (ref 12–16)
IMMATURE RETIC FRACT: 27.9 % (ref 3–15.9)
IRON SATURATION: 19 % (ref 20–50)
IRON: 15 UG/DL (ref 50–170)
LACTIC ACID: 1.1 MMOL/L (ref 0.5–2)
MCH RBC QN AUTO: 26.2 PG (ref 26–33)
MCHC RBC AUTO-ENTMCNC: 27.3 GM/DL (ref 32.2–35.5)
MCV RBC AUTO: 95.8 FL (ref 81–99)
PLATELET # BLD: 371 THOU/MM3 (ref 130–400)
PMV BLD AUTO: 8.3 FL (ref 9.4–12.4)
POTASSIUM SERPL-SCNC: 3.9 MEQ/L (ref 3.5–5.2)
RBC # BLD: 3.13 MILL/MM3 (ref 4.2–5.4)
RETIC HEMOGLOBIN: 18.3 PG (ref 28.2–35.7)
RETICULOCYTE ABSOLUTE COUNT: 3.1 % (ref 0.5–2)
SODIUM BLD-SCNC: 136 MEQ/L (ref 135–145)
TOTAL IRON BINDING CAPACITY: 79 UG/DL (ref 171–450)
VITAMIN B-12: 1966 PG/ML (ref 211–911)
WBC # BLD: 22.3 THOU/MM3 (ref 4.8–10.8)

## 2022-07-16 PROCEDURE — 85027 COMPLETE CBC AUTOMATED: CPT

## 2022-07-16 PROCEDURE — 2580000003 HC RX 258

## 2022-07-16 PROCEDURE — 82746 ASSAY OF FOLIC ACID SERUM: CPT

## 2022-07-16 PROCEDURE — 36415 COLL VENOUS BLD VENIPUNCTURE: CPT

## 2022-07-16 PROCEDURE — 6360000002 HC RX W HCPCS: Performed by: UROLOGY

## 2022-07-16 PROCEDURE — 99232 SBSQ HOSP IP/OBS MODERATE 35: CPT | Performed by: INTERNAL MEDICINE

## 2022-07-16 PROCEDURE — 2580000003 HC RX 258: Performed by: UROLOGY

## 2022-07-16 PROCEDURE — 99223 1ST HOSP IP/OBS HIGH 75: CPT | Performed by: NUCLEAR MEDICINE

## 2022-07-16 PROCEDURE — 1200000003 HC TELEMETRY R&B

## 2022-07-16 PROCEDURE — 92610 EVALUATE SWALLOWING FUNCTION: CPT

## 2022-07-16 PROCEDURE — 83540 ASSAY OF IRON: CPT

## 2022-07-16 PROCEDURE — 82607 VITAMIN B-12: CPT

## 2022-07-16 PROCEDURE — 6370000000 HC RX 637 (ALT 250 FOR IP): Performed by: NUCLEAR MEDICINE

## 2022-07-16 PROCEDURE — 85046 RETICYTE/HGB CONCENTRATE: CPT

## 2022-07-16 PROCEDURE — 6370000000 HC RX 637 (ALT 250 FOR IP)

## 2022-07-16 PROCEDURE — 6360000002 HC RX W HCPCS

## 2022-07-16 PROCEDURE — 2500000003 HC RX 250 WO HCPCS

## 2022-07-16 PROCEDURE — 83550 IRON BINDING TEST: CPT

## 2022-07-16 PROCEDURE — 6370000000 HC RX 637 (ALT 250 FOR IP): Performed by: UROLOGY

## 2022-07-16 PROCEDURE — 80048 BASIC METABOLIC PNL TOTAL CA: CPT

## 2022-07-16 PROCEDURE — 83605 ASSAY OF LACTIC ACID: CPT

## 2022-07-16 PROCEDURE — 82728 ASSAY OF FERRITIN: CPT

## 2022-07-16 RX ORDER — AMIODARONE HYDROCHLORIDE 200 MG/1
200 TABLET ORAL 2 TIMES DAILY
Status: DISCONTINUED | OUTPATIENT
Start: 2022-07-16 | End: 2022-07-21 | Stop reason: HOSPADM

## 2022-07-16 RX ORDER — MORPHINE SULFATE 2 MG/ML
1 INJECTION, SOLUTION INTRAMUSCULAR; INTRAVENOUS ONCE
Status: DISCONTINUED | OUTPATIENT
Start: 2022-07-16 | End: 2022-07-21 | Stop reason: HOSPADM

## 2022-07-16 RX ORDER — SODIUM BICARBONATE 650 MG/1
1300 TABLET ORAL 3 TIMES DAILY
Status: DISCONTINUED | OUTPATIENT
Start: 2022-07-16 | End: 2022-07-21 | Stop reason: HOSPADM

## 2022-07-16 RX ORDER — ONDANSETRON 2 MG/ML
4 INJECTION INTRAMUSCULAR; INTRAVENOUS ONCE
Status: DISCONTINUED | OUTPATIENT
Start: 2022-07-16 | End: 2022-07-21 | Stop reason: HOSPADM

## 2022-07-16 RX ADMIN — SODIUM CHLORIDE, PRESERVATIVE FREE 10 ML: 5 INJECTION INTRAVENOUS at 21:05

## 2022-07-16 RX ADMIN — HEPARIN SODIUM 5000 UNITS: 5000 INJECTION INTRAVENOUS; SUBCUTANEOUS at 22:00

## 2022-07-16 RX ADMIN — MIDODRINE HYDROCHLORIDE 7.5 MG: 2.5 TABLET ORAL at 17:22

## 2022-07-16 RX ADMIN — PIPERACILLIN AND TAZOBACTAM 2250 MG: 2; .25 INJECTION, POWDER, LYOPHILIZED, FOR SOLUTION INTRAVENOUS at 03:29

## 2022-07-16 RX ADMIN — AMIODARONE HYDROCHLORIDE 200 MG: 200 TABLET ORAL at 10:29

## 2022-07-16 RX ADMIN — FOLIC ACID 1 MG: 1 TABLET ORAL at 10:29

## 2022-07-16 RX ADMIN — FAMOTIDINE 20 MG: 20 TABLET ORAL at 10:29

## 2022-07-16 RX ADMIN — PIPERACILLIN AND TAZOBACTAM 2250 MG: 2; .25 INJECTION, POWDER, LYOPHILIZED, FOR SOLUTION INTRAVENOUS at 18:10

## 2022-07-16 RX ADMIN — SERTRALINE 25 MG: 50 TABLET, FILM COATED ORAL at 10:30

## 2022-07-16 RX ADMIN — DILTIAZEM HYDROCHLORIDE 15 MG/HR: 5 INJECTION, SOLUTION INTRAVENOUS at 03:28

## 2022-07-16 RX ADMIN — MIDODRINE HYDROCHLORIDE 7.5 MG: 2.5 TABLET ORAL at 10:29

## 2022-07-16 RX ADMIN — HEPARIN SODIUM 5000 UNITS: 5000 INJECTION INTRAVENOUS; SUBCUTANEOUS at 15:24

## 2022-07-16 ASSESSMENT — PAIN SCALES - GENERAL
PAINLEVEL_OUTOF10: 0

## 2022-07-16 NOTE — FLOWSHEET NOTE
Patient refuses to allow this RN to change her would dressings on buttocks, hip, and toes. Patient states it causes too much pain, This RN offered a dose of Morphine before changing the wound dressings-patient refused. Patient tearful resting in bed at this time.

## 2022-07-16 NOTE — PROGRESS NOTES
[facility]   Nephrology progress note  7/16/2022, 11:18 AM      Pt Name:    Alba Nix  MRN:     [id number]     YOB: 1950  Admit Date:    7/11/2022  8:49 AM    Chief Complaint: Nephrology following for  Acute kidney injury needing hemodialysis  .     Subjective:  Patient seen and examined  No chest pain or shortness of breath  Does not offer much  Currently on IV Cardizem  On IV normal saline  On nasal cannula    Objective:  24HR INTAKE/OUTPUT:      Intake/Output Summary (Last 24 hours) at 7/16/2022 1118  Last data filed at 7/16/2022 0600  Gross per 24 hour   Intake 1965 ml   Output 3100 ml   Net -1135 ml        Admission weight: 141 lb 3.2 oz (64 kg)  Wt Readings from Last 3 Encounters:   07/11/22 141 lb 3.2 oz (64 kg)   06/24/22 140 lb (63.5 kg)   05/31/22 140 lb (63.5 kg)        Vitals :   Vitals:    07/16/22 0150 07/16/22 0330 07/16/22 0600 07/16/22 1015   BP: (!) 101/56 (!) 104/50 (!) 117/56 (!) 103/51   Pulse: 96 (!) 104 98 (!) 102   Resp:  18  18   Temp:  98.2 °F (36.8 °C)     TempSrc:  Oral     SpO2:  (!) 86%     Weight:       Height:       PF:           Physical examination  General Appearance: Comfortable, no acute distress  Mouth/Throat:  Oral mucosa moist  Neck:  Supple, no JVD  Lungs: Diminished air entry at the bases, poor effort  Heart[de-identified]  S1,S2 heard  Abdomen:  Soft, non - tender    Medications:  Infusion:    sodium chloride Stopped (07/16/22 0329)    sodium chloride      sodium chloride       Meds:    amiodarone  200 mg Oral BID    midodrine  7.5 mg Oral TID WC    piperacillin-tazobactam  2,250 mg IntraVENous Q8H    [Held by provider] bumetanide  1 mg Oral Daily    famotidine  20 mg PEG Tube Daily    folic acid  1 mg Oral Daily    [Held by provider] metoprolol tartrate  25 mg Oral BID    sertraline  25 mg Oral Daily    sodium chloride flush  10 mL IntraCATHeter TID    sodium hypochlorite   Irrigation BID    sodium chloride flush  5-40 mL IntraVENous 2 times per day    [Held by provider] heparin (porcine)  5,000 Units SubCUTAneous 3 times per day     Meds prn: sodium chloride, acetaminophen, docusate sodium, ipratropium-albuterol, melatonin, sodium chloride flush, sodium chloride, ondansetron **OR** ondansetron, polyethylene glycol     Lab Data :  CBC:   Recent Labs     07/14/22  1442 07/15/22  0539 07/16/22  0425   WBC 19.3* 14.1* 22.3*   HGB 8.2* 8.2* 8.2*   HCT 28.8* 31.1* 30.0*    312 371       CMP:  Recent Labs     07/14/22  2116 07/15/22  0539 07/16/22  0425    134* 136   K 3.7 3.8 3.9    103 107   CO2 17* 17* 17*   BUN 89* 87* 87*   CREATININE 3.3* 3.0* 3.0*   GLUCOSE 199* 159* 143*   CALCIUM 8.7 8.3* 8.5   MG 1.6  --   --    PHOS 5.8*  --   --        Hepatic: No results for input(s): LABALBU, AST, ALT, ALB, BILITOT, ALKPHOS in the last 72 hours. Assessment and Plan:  ALVIN on CKD  Has not had dialysis in last several days  Serum creatinine is staying stable around 3.0  No acute need for dialysis today  Continue to monitor  Volume status reasonably stable    2. Metabolic acidosis: We will start patient on oral sodium bicarbonate 1300 mg 3 times daily  3. Mild hyponatremia. Improved  4. Leukocytosis  5. 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 Status post nephrostomy tube placement 7/13/2022  6. Hypotension on midodrine       Griselda Horta MD  [facility]    This report has been created using voice recognition software.  It may contain minor errors which are inherent in voice recognition technology

## 2022-07-16 NOTE — PROGRESS NOTES
deconditioned and does not appear to be ambulatory at her baseline  Symptomatic anemia with underlying chronic kidney disease likely secondary to concomitant chronic inflammation from stage IV decubitus ulcerations and chronic kidney disease, patient required PRBC transfusion 7/12/2022  Anasarca likely due to liver dysfunction with April 2022 serum albumin of 1.8 g/dL        Patient Seen, Chart, Consults notes, Labs, Radiology studies reviewed. Subjective: Day 1 of stay with hospital stay  Patient appears to be hemodynamically stable  Patient's receive diltiazem drip with adequate appropriate decrease in heart rate also has had amiodarone transition to oral greatly appreciate assistance from cardiology. Blood pressures remain adequate  This is day 1 of p.o. amiodarone    Patient's medical power of  and her  were present within the room and was able to obtain regarding current clinical care    On initial physical survey this morning patient had leakage of feeds from PEG tube require review of recent CT of the abdomen to evaluate PEG tube placement, patient has a flat affect and initiated fused all interventions by nursing staff  However with presence of patient's medical power of  the patient states she desires continued care      All other ROS negative except noted in HPI    Past, Family, Social History unchanged from admission.     Diet:  Diet NPO    Medications:  Scheduled Meds:   amiodarone  200 mg Oral BID    sodium bicarbonate  1,300 mg Oral TID    morphine  1 mg IntraVENous Once    ondansetron  4 mg IntraVENous Once    midodrine  7.5 mg Oral TID WC    piperacillin-tazobactam  2,250 mg IntraVENous Q8H    [Held by provider] bumetanide  1 mg Oral Daily    famotidine  20 mg PEG Tube Daily    folic acid  1 mg Oral Daily    [Held by provider] metoprolol tartrate  25 mg Oral BID    sertraline  25 mg Oral Daily    sodium chloride flush  10 mL IntraCATHeter TID    sodium hypochlorite Irrigation BID    sodium chloride flush  5-40 mL IntraVENous 2 times per day    [Held by provider] heparin (porcine)  5,000 Units SubCUTAneous 3 times per day     Continuous Infusions:   sodium chloride 40 mL/hr at 07/16/22 1226    sodium chloride      sodium chloride       PRN Meds:sodium chloride, acetaminophen, docusate sodium, ipratropium-albuterol, melatonin, sodium chloride flush, sodium chloride, ondansetron **OR** ondansetron, polyethylene glycol    Objective:  CBC:   Recent Labs     07/14/22  1442 07/15/22  0539 07/16/22 0425   WBC 19.3* 14.1* 22.3*   HGB 8.2* 8.2* 8.2*    312 371     BMP:    Recent Labs     07/14/22  2116 07/15/22  0539 07/16/22 0425    134* 136   K 3.7 3.8 3.9    103 107   CO2 17* 17* 17*   BUN 89* 87* 87*   CREATININE 3.3* 3.0* 3.0*   GLUCOSE 199* 159* 143*     Calcium:  Recent Labs     07/16/22 0425   CALCIUM 8.5     Ionized Calcium:No results for input(s): IONCA in the last 72 hours. Magnesium:  Recent Labs     07/14/22 2116   MG 1.6     Phosphorus:  Recent Labs     07/14/22 2116   PHOS 5.8*     BNP:No results for input(s): BNP in the last 72 hours. Glucose:No results for input(s): POCGLU in the last 72 hours. HgbA1C: No results for input(s): LABA1C in the last 72 hours. INR: No results for input(s): INR in the last 72 hours. Hepatic: No results for input(s): ALKPHOS, ALT, AST, PROT, BILITOT, BILIDIR, LABALBU in the last 72 hours. Amylase and Lipase:  Recent Labs     07/16/22 0425   LACTA 1.1     Lactic Acid:   Recent Labs     07/16/22 0425   LACTA 1.1     Troponin: No results for input(s): CKTOTAL, CKMB, TROPONINT in the last 72 hours. BNP: No results for input(s): BNP in the last 72 hours. Lipids: No results for input(s): CHOL, TRIG, HDL, LDL, LDLCALC in the last 72 hours.   ABGs:   Lab Results   Component Value Date/Time    PH 7.32 07/15/2022 04:22 AM    PCO2 34 07/15/2022 04:22 AM    PO2 69 07/15/2022 04:22 AM    HCO3 18 07/15/2022 04:22 AM    O2SAT 92 07/15/2022 04:22 AM           Radiology reports as per the Radiologist  Radiology: XR INJ CONTRAST GASTRIC TUBE PERC    Result Date: 6/24/2022  PROCEDURE: XR INJ CONTRAST GASTRIC TUBE PERC CLINICAL INFORMATION: PEG replacement . COMPARISON: 1/14/2022. CT abdomen pelvis 4/12/2022. TECHNIQUE: Initially a supine KUB was obtained. A 2nd supine KUB was taken after injecting Gastrografin through the existing gastrostomy tube. FINDINGS: On  image, there is a gastrostomy tube. The tip is superimposed over the mid abdomen. There is a nephrostomy tube in the left kidney. There are no distended bowel loops. There is a tube superimposed over the pelvis. After injection of Gastrografin, contrast is seen in the stomach, duodenal bulb and a proximal small bowel loop. The tip is within the stomach. G-tube tip within the stomach. **This report has been created using voice recognition software. It may contain minor errors which are inherent in voice recognition technology. ** Final report electronically signed by Dr. Ellie Camacho on 6/24/2022 7:17 AM    IR GUIDED NEPHROSTOMY CATH EXCHANGE    Result Date: 7/13/2022  NEPHROSTOMY TUBE EXCHANGE: PERFORMED BY: Jered Moody M.D. CLINICAL INFORMATION: Ureteral obstruction. Staghorn calculi. INDWELLING CATHETER: 8 [place]ian nephrostomy tube. NEW CATHETER: 8 [place]ian nephrostomy tube. FLUOROSCOPY TIME: 1.2 minutes FLUOROSCOPIC IMAGES: 5 ESTIMATED BLOOD LOSS: Minimal SEDATION: Versed 0.5 mg; Dilaudid 0.5 mg, IV; the patient was sedated during this procedure,  and monitored with EKG and pulse ox monitoring devices by a registered nurse. Face-to-face time with patient 10 minutes. PROCEDURE:  Signed informed consent was obtained prior to performing this procedure. Exposed portions of the nephrostomy tube and surrounding skin were prepped and draped in a sterile fashion.  Iodinated contrast was injected and a nephrostogram was performed, revealing the nephrostomy tube to be in good position and functioning well. Fluoroscopic images were obtained for documentation. The indwelling nephrostomy tube was then removed over a guidewire and a new nephrostomy tube, as listed above, was advanced over the Glidewire with the tip coiled in the renal pelvis. . This was performed with fluoroscopic guidance. Fluoroscopic images were obtained for documentation. A Repeat nephrostogram was performed, confirming the catheter to be in good position and functioning well. A small amount of antibiotic ointment was applied to the  wound site. The catheter was stabilized to the skin with a 2-0 silk suture and a split gauze dressing. .. 1. Status post successful nephrostomy tube exchange. 2. Patient will be tentatively scheduled for a repeat routine nephrostomy tube exchange in the next 3-4 months. **This report has been created using voice recognition software. It may contain minor errors which are inherent in voice recognition technology. ** Final report electronically signed by Dr. Halie Arroyo on 7/13/2022 9:06 AM        Physical Exam:  Vitals: BP (!) 101/56   Pulse (!) 107   Temp 98.4 °F (36.9 °C) (Oral)   Resp 20   Ht 4' 9\" (1.448 m)   Wt 141 lb 3.2 oz (64 kg)   SpO2 97%   PF (!) 20 L/min   BMI 30.56 kg/m²   24 hour intake/output:  Intake/Output Summary (Last 24 hours) at 7/16/2022 1458  Last data filed at 7/16/2022 0600  Gross per 24 hour   Intake 1965 ml   Output 2550 ml   Net -585 ml     Last 3 weights: Wt Readings from Last 3 Encounters:   07/11/22 141 lb 3.2 oz (64 kg)   06/24/22 140 lb (63.5 kg)   05/31/22 140 lb (63.5 kg)       General appearance - alert, awake appears to be in no acute distress  HEENT: Atraumatic normocephalic, no JVD. Trachea midline.    Chest - Bilateral air entry, no wheezes, crackles or rhonchi  Cardiovascular -irregularly irregular  Abdomen -on initial physical survey patient appears to have a PEG tube in place with leakage of feeds, also appears of colostomy bag in place left lower quadrant   neurological - non focal, no neurological deficits noted  Integumentary - Skin color, texture, turgor normal. No Rashes or lesions  Musculoskeletal -Full ROM, No clubbing or cyanosis  Extremities: Bilateral lower extremity occlusive dressings anterior tibia secondary to skin breakdown, posterior sacral decubital ulcers with areas of skin breakdown right posterior thorax area skin breakdown with small amount of pus  Psychiatric, flat affect    DVT prophylaxis: [] Lovenox                                 [x] SCDs                                 [] SQ Heparin                                 [x] Encourage ambulation           [] Already on Anticoagulation, will start low-dose heparin for DVT prophylaxis and renally dose it as well               Electronically signed by Deondre Tatum MD on 7/16/2022 at 2:58 PM    [facility]

## 2022-07-16 NOTE — PLAN OF CARE
Problem: Chronic Conditions and Co-morbidities  Goal: Patient's chronic conditions and co-morbidity symptoms are monitored and maintained or improved  Outcome: Progressing  Flowsheets (Taken 7/15/2022 1940)  Care Plan - Patient's Chronic Conditions and Co-Morbidity Symptoms are Monitored and Maintained or Improved: Monitor and assess patient's chronic conditions and comorbid symptoms for stability, deterioration, or improvement     Problem: Safety - Adult  Goal: Free from fall injury  Outcome: Progressing     Problem: ABCDS Injury Assessment  Goal: Absence of physical injury  Outcome: Progressing     Problem: Skin/Tissue Integrity  Goal: Absence of new skin breakdown  Description: 1. Monitor for areas of redness and/or skin breakdown  2. Assess vascular access sites hourly  3. Every 4-6 hours minimum:  Change oxygen saturation probe site  4. Every 4-6 hours:  If on nasal continuous positive airway pressure, respiratory therapy assess nares and determine need for appliance change or resting period.   Outcome: Progressing     Problem: Discharge Planning  Goal: Discharge to home or other facility with appropriate resources  Outcome: Progressing  Flowsheets (Taken 7/15/2022 1940)  Discharge to home or other facility with appropriate resources: Identify barriers to discharge with patient and caregiver     Problem: Nutrition Deficit:  Goal: Optimize nutritional status  Outcome: Progressing     Problem: Pain  Goal: Verbalizes/displays adequate comfort level or baseline comfort level  Outcome: Progressing

## 2022-07-16 NOTE — PROGRESS NOTES
[address] [address]  SPEECH THERAPY  STRZ CCU-STEPDOWN 3B  Clinical Swallow Evaluation      SLP Individual Minutes  Time In: 9112  Time Out: 6831  Minutes: 13  Timed Code Treatment Minutes: 0 Minutes       Date: 2022  Patient Name: Marilyn Burton      CSN: [id number]   : 1950  (73 y.o.)  Gender: female   Referring Physician: LANE Gutierrez CNP    Diagnosis: Staghorn calculus    History of Present Illness/Injury: Patient admitted to [facility] with above med dx; please refer to physician H&P for full details. Patient with admission for sepsis with chronic dysphagia; PEG tube maintains for nocturnal feeds for enteral nutrition/hydration measures. Documentation for variable pharyngeal dysphagia with positive hx for silent aspiration. Most recent FEES completed on 22 consistent for mild-moderate pharyngeal dysphagia, dietary recommendations of soft and bite size with moderately thick liquids. ECF indicates dietary advancement to, \"mechanical soft and thin liquids\". ST consulted to complete clinical swallow evaluation to assist with development of dysphagia management POC. Past Medical History:   Diagnosis Date    CHF (congestive heart failure) ([facility])     Dr. Dylan Foley    Depression     Gout attack     Hemodialysis patient [facility])     Hypertension     Osteoarthritis     Pulmonary artery hypertension ([facility] [address]     [facility]-- Dr. Kaye Saravia-- Dr. Dylan Foley    Renal calculi     Dr. Michael Romero    Thrombocytopenia [facility])        SUBJECTIVE:  NOAM Chaudhary with approval to proceed with assessment. Upon arrival, patient resting supine in bed, no family at bedside. Confusion noted, not able to comment on baseline mental status. OBJECTIVE:    Pain:  No pain reported.     Current Diet: Easy to chew, thin liquids; nocturnal TF via PEG/G-tube    Respiratory Status:  Nasal Canula    Behavioral Observation:  Alert, Confused, Limited Direction Following, and Agitated    CRANIAL NERVE ASSESSMENT  *limitations for comprehensiveness of cranial nerve assessment s/t difficulties with 1-step command execution    CN V (Trigeminal) Closes and Opens Mandible Impaired    Rotary Jaw Movement Impaired      CN VII (Facial) Cheeks Hold Food out of Sulci WFL    Opens, Closes/Seals, Protrudes, Retracts Lips Impaired    General Appearance Impaired; generalized weakness    Sensation Not Tested      CN X (Vagus - Pharyngeal) Raises Back of Tongue Not Tested      CN XI (Accessory) Lifts Soft Palate Not Tested      CN XII (Hypoglossal) Elevates Tongue Up and Back Not Tested    Protrusion   Impaired    Lateralizes Tongue Not Tested    Sensation Not Tested      Other Observations Dentition WFL    Vocal Quality Dysphonia    Cough Dystussia      Impressions: Patient presents with suspected mild-moderate oral dysphagia with the inability to fully discern potential presence of pharyngeal deficits without formal instrumentation. At this time, patient DID NOT participate in PO trials; high level of refusal to transition to elevated carvalho placement with adamant negation for intake of PO offerings despite extensive alternatives offered; increasing agitation noted. Documentation consistent for variable pharyngeal phase impairments, including hx for laryngeal penetration and tracheal silent aspiration during hospitalizations d/t decline in medical status. Aforementioned skilled observations may be indicative of high risk for airway invasion; however, certainly unable to rule out pharyngeal dysfunction without formal instrumentation. Education provided to patient re: s/s aspiration (immediate/delayed coughing, throat clearing, wet vocal quality) and potential implications (pneumonia, recurrent/prolonged hospitalizations, medical decline). STRONG recommendations conveyed to patient for engagement in instrumental evaluation via MBS with patient explicitly verbalizing, \"no, I won't do it\".  F/u with attending provider Osito Valdovinos MD to assist with POC development, as family was not present to assist as needed in development of POC (code status, FULL CODE). Per attending provider, transition to NPO diet level with f/u on 3/65/34 to ascertain agreement levels to proceed with established dysphagia POC. *post evaluation, patient without respiratory distress upon leaving room; RN Ryan Kaur notified re: clinical findings and recommendations from the assessment; verbal receptiveness noted   **considerations for palliative care consult     RECOMMENDATIONS/ASSESSMENT:  Instrumental Evaluation: Modified Barium Swallow (MBS) recommended; will f/u on 7/18/22 to determine agreement levels to proceed with diagnostic assessment  Diet Recommendations:  NPO; TF via PEG/G-tube  Strategies:  Oral care q2h  Rehabilitation Potential: fair    EDUCATION:  Learner: Patient  Education:  Reviewed results and recommendations of this evaluation, Reviewed signs, symptoms and risks of aspiration, Reviewed ST goals and Plan of Care, Reviewed recommendations for follow-up, Education Related to Potential Risks and Complications Due to Impairment/Illness/Injury, and Education Related to Prevention of Recurrence of Impairment/Illness/Injury  Evaluation of Education: Needs further instruction, No evidence of learning, and Family not present    PLAN:  Skilled SLP intervention on acute care 3-5 x per week or until goals met and/or pt plateaus in function. Specific interventions for next session may include: dysphagia management. PATIENT GOAL:    Did not state. Will further assess during treatment. SHORT TERM GOALS:  Short-term Goals  Timeframe for Short-term Goals: 2 weeks  Goal 1: Patient will consume conservative PO trials of ice chips to ensure consistency for pharyngeal swallow trigger to determine readiness for progression to MBS.   Goal 2: Staff will exhibit return demonstration for completion of comprehensive oral care procedural analysis to maximize oral integrity and to reduce potential bacteria colonization. Goal 3: Monitor mentation; consider completion of cognitive linguistic evaluation dependent upon baseline status confirmation with familial members.       LONG TERM GOALS:  No LTG established given short ELOS      Elio Copeland M.A., [address] [facility]

## 2022-07-16 NOTE — CONSULTS
[address] [id number]                                  CONSULTATION    PATIENT NAME: Flo PENALOZA                      :        1950  MED REC NO:   [id number]                           ROOM:       0032  ACCOUNT NO:   [de-identified]                           ADMIT DATE: 2022  PROVIDER:     Shelby Landa M.D.    [facility] DATE:  2022    CARDIOLOGY CONSULTATION    REASON FOR THE CONSULTATION:  Atrial fibrillation. REQUESTING PROVIDER:  Hospitalist Service. HISTORY OF PRESENT ILLNESS:  This is a pleasant 72-year-old patient who  is not necessarily the best historian who seemed to be somehow drowsy  and was not able to give much history. She apparently came in with some  kidney stone and worsening renal failure. She does have underlying  cardiorenal disease and she [facility] with [facility] to  start with for diastolic dysfunction. She apparently has had previous  catheterization with normal coronary arteries and some element of  diastolic dysfunction with renal insufficiency. Comes in with worsening  renal failure, kidney stone and has had some intermittent atrial  fibrillation on the EKG. She is not aware of any previous atrial  fibrillation or I could find any evidence in the chart. We are  consulted to assist in the management of the patient. REVIEW OF SYSTEMS:  The patient has had fever, chills. The patient has  had dysuria. Abdominal pain and nausea lately. No obvious active  psych problems or suicidal ideation. No skin rashes. No obvious  dizziness, lightheadedness or loss of consciousness. No recent trauma. No bleeding problem. No HEENT problems. PAST MEDICAL HISTORY:  1.  Diastolic dysfunction. 2.  Hypertension. 3.  Renal insufficiency. 4.  Hyperlipidemia. 5.  Arthritis. ALLERGIES:  NO KNOWN DRUG ALLERGIES.     MEDICATIONS:  Bumex 1 mg daily, _____ 7.5 three times a

## 2022-07-17 ENCOUNTER — APPOINTMENT (OUTPATIENT)
Dept: CT IMAGING | Age: 72
DRG: 698 | End: 2022-07-17
Attending: UROLOGY
Payer: MEDICARE

## 2022-07-17 LAB
ANION GAP SERPL CALCULATED.3IONS-SCNC: 14 MEQ/L (ref 8–16)
BLOOD CULTURE, ROUTINE: NORMAL
BLOOD CULTURE, ROUTINE: NORMAL
BUN BLDV-MCNC: 82 MG/DL (ref 7–22)
CALCIUM SERPL-MCNC: 8.1 MG/DL (ref 8.5–10.5)
CHLORIDE BLD-SCNC: 108 MEQ/L (ref 98–111)
CO2: 17 MEQ/L (ref 23–33)
CREAT SERPL-MCNC: 3 MG/DL (ref 0.4–1.2)
ERYTHROCYTE [DISTWIDTH] IN BLOOD BY AUTOMATED COUNT: 17.9 % (ref 11.5–14.5)
ERYTHROCYTE [DISTWIDTH] IN BLOOD BY AUTOMATED COUNT: 63 FL (ref 35–45)
GFR SERPL CREATININE-BSD FRML MDRD: 15 ML/MIN/1.73M2
GLUCOSE BLD-MCNC: 103 MG/DL (ref 70–108)
HCT VFR BLD CALC: 28.9 % (ref 37–47)
HEMOGLOBIN: 7.9 GM/DL (ref 12–16)
MCH RBC QN AUTO: 26.2 PG (ref 26–33)
MCHC RBC AUTO-ENTMCNC: 27.3 GM/DL (ref 32.2–35.5)
MCV RBC AUTO: 95.7 FL (ref 81–99)
PLATELET # BLD: 286 THOU/MM3 (ref 130–400)
PMV BLD AUTO: 8.8 FL (ref 9.4–12.4)
POTASSIUM SERPL-SCNC: 3.7 MEQ/L (ref 3.5–5.2)
RBC # BLD: 3.02 MILL/MM3 (ref 4.2–5.4)
SODIUM BLD-SCNC: 139 MEQ/L (ref 135–145)
URINE CULTURE, ROUTINE: NORMAL
WBC # BLD: 18.2 THOU/MM3 (ref 4.8–10.8)

## 2022-07-17 PROCEDURE — 99232 SBSQ HOSP IP/OBS MODERATE 35: CPT | Performed by: INTERNAL MEDICINE

## 2022-07-17 PROCEDURE — 6370000000 HC RX 637 (ALT 250 FOR IP): Performed by: NUCLEAR MEDICINE

## 2022-07-17 PROCEDURE — 36415 COLL VENOUS BLD VENIPUNCTURE: CPT

## 2022-07-17 PROCEDURE — 6360000002 HC RX W HCPCS: Performed by: UROLOGY

## 2022-07-17 PROCEDURE — 6370000000 HC RX 637 (ALT 250 FOR IP): Performed by: INTERNAL MEDICINE

## 2022-07-17 PROCEDURE — 2580000003 HC RX 258: Performed by: UROLOGY

## 2022-07-17 PROCEDURE — 1200000003 HC TELEMETRY R&B

## 2022-07-17 PROCEDURE — 74176 CT ABD & PELVIS W/O CONTRAST: CPT

## 2022-07-17 PROCEDURE — 6370000000 HC RX 637 (ALT 250 FOR IP)

## 2022-07-17 PROCEDURE — 80048 BASIC METABOLIC PNL TOTAL CA: CPT

## 2022-07-17 PROCEDURE — 6370000000 HC RX 637 (ALT 250 FOR IP): Performed by: UROLOGY

## 2022-07-17 PROCEDURE — 2580000003 HC RX 258

## 2022-07-17 PROCEDURE — 85027 COMPLETE CBC AUTOMATED: CPT

## 2022-07-17 PROCEDURE — 6360000002 HC RX W HCPCS

## 2022-07-17 RX ADMIN — SODIUM BICARBONATE 1300 MG: 650 TABLET ORAL at 14:27

## 2022-07-17 RX ADMIN — MIDODRINE HYDROCHLORIDE 7.5 MG: 2.5 TABLET ORAL at 16:31

## 2022-07-17 RX ADMIN — PIPERACILLIN AND TAZOBACTAM 2250 MG: 2; .25 INJECTION, POWDER, LYOPHILIZED, FOR SOLUTION INTRAVENOUS at 02:37

## 2022-07-17 RX ADMIN — HEPARIN SODIUM 5000 UNITS: 5000 INJECTION INTRAVENOUS; SUBCUTANEOUS at 06:15

## 2022-07-17 RX ADMIN — HEPARIN SODIUM 5000 UNITS: 5000 INJECTION INTRAVENOUS; SUBCUTANEOUS at 14:27

## 2022-07-17 RX ADMIN — AMIODARONE HYDROCHLORIDE 200 MG: 200 TABLET ORAL at 21:32

## 2022-07-17 RX ADMIN — ACETAMINOPHEN 325MG 650 MG: 325 TABLET ORAL at 12:52

## 2022-07-17 RX ADMIN — SODIUM CHLORIDE, PRESERVATIVE FREE 10 ML: 5 INJECTION INTRAVENOUS at 14:28

## 2022-07-17 RX ADMIN — AMIODARONE HYDROCHLORIDE 200 MG: 200 TABLET ORAL at 12:45

## 2022-07-17 RX ADMIN — MIDODRINE HYDROCHLORIDE 7.5 MG: 2.5 TABLET ORAL at 12:45

## 2022-07-17 RX ADMIN — SODIUM CHLORIDE, PRESERVATIVE FREE 10 ML: 5 INJECTION INTRAVENOUS at 09:00

## 2022-07-17 RX ADMIN — DAKIN'S SOLUTION 0.125% (QUARTER STRENGTH): 0.12 SOLUTION at 10:31

## 2022-07-17 RX ADMIN — SODIUM BICARBONATE 1300 MG: 650 TABLET ORAL at 21:32

## 2022-07-17 RX ADMIN — HEPARIN SODIUM 5000 UNITS: 5000 INJECTION INTRAVENOUS; SUBCUTANEOUS at 21:32

## 2022-07-17 ASSESSMENT — PAIN DESCRIPTION - DESCRIPTORS
DESCRIPTORS: ACHING
DESCRIPTORS: ACHING

## 2022-07-17 ASSESSMENT — PAIN SCALES - GENERAL
PAINLEVEL_OUTOF10: 3
PAINLEVEL_OUTOF10: 3

## 2022-07-17 ASSESSMENT — PAIN DESCRIPTION - LOCATION
LOCATION: GENERALIZED
LOCATION: GENERALIZED

## 2022-07-17 ASSESSMENT — PAIN DESCRIPTION - PAIN TYPE: TYPE: CHRONIC PAIN

## 2022-07-17 ASSESSMENT — PAIN DESCRIPTION - FREQUENCY: FREQUENCY: INTERMITTENT

## 2022-07-17 NOTE — PROGRESS NOTES
2115-Dr. Nichole Noe paged about pt leaking PEG tube from day shift and to not use PEG tube. Pt also NPO for dysphagia. This RN asked if there was another route to try for pills tonight. 2145-Dr. Connelly paged back and asked for an NG tube to be placed tonight for medications only. 2200-This RN asked pt if she would like NG tube for medications tonight and pt refused. Pt educated on the importance of receiving medications. NG tube not placed.

## 2022-07-17 NOTE — CONSULTS
CONSULTATION NOTE :ID       Patient - Francine Loza,  Age - 73 y.o.    - 1950      Room Number - 3B-32/032-A   N -  [id number]   [facility]  Date of Admission -  2022  8:49 AM  Patient's PCP: Divina Bliss MD     Requesting Physician: Ren Hand MD    REASON FOR CONSULTATION   Sepsis with hx  of mutiple wounds  CHIEF COMPLAINT   Admitted for exchange of nephrostomy tube    HISTORY OF PRESENT ILLNESS       This is a very pleasant 73 y.o. female who was admitted to the hospital for exchange of nephrostomy tube. I was asked to see patient for suspected sepsis. She has left nephrostomy tube and catheter. Has a tunnelled dialysis catheter. I was asked to see patient for suspected infection. She reports of not feeling well had fever. She is from nursing home. She has multiple ischial and leg wound related to pressure. She is on broad spectrum antibiotic. She has leukocytosis. known to have a nephrostomy tube.     PAST MEDICAL  HISTORY       Past Medical History:   Diagnosis Date    CHF (congestive heart failure) ([facility])     Dr. Skip Copeland    Depression     Gout attack     Hemodialysis patient [facility])     Hypertension     Osteoarthritis     Pulmonary artery hypertension ([facility] [address]     [facility]-- Dr. Galen Mcguire-- Dr. Skip Copeland    Renal calculi     Dr. Padma Lakhani    Thrombocytopenia [facility])        PAST SURGICAL HISTORY     Past Surgical History:   Procedure Laterality Date    APPENDECTOMY      BRONCHOSCOPY N/A 2021    BRONCHOSCOPY performed by Walter Duckworth MD at [facility]    BRONCHOSCOPY N/A 2021    BRONCHOSCOPY WITHOUT FLURO performed by Walter Duckworth MD at [facility]    COLECTOMY N/A 2022    LAPAROSCOPY WITH DIVERTING LOOP COLOSTOMY performed by Arnold Shore MD at [address] N/A 2021    EGD PEG TUBE PLACEMENT performed by Renata Proctor MD at [facility]    IR CHEST TUBE INSERTION  11/26/2021    IR CHEST TUBE INSERTION 11/26/2021 [facility]    IR NEPHROSTOMY PERCUTANEOUS LEFT  11/1/2021    IR NEPHROSTOMY PERCUTANEOUS LEFT 11/1/2021 MD JORDON Jeong SPECIAL PROCEDURES    IR NEPHROSTOMY PERCUTANEOUS LEFT  3/14/2022    IR NEPHROSTOMY PERCUTANEOUS LEFT 3/14/2022 MD JORDON Eng SPECIAL PROCEDURES    PRESSURE ULCER DEBRIDEMENT Right 8/6/2021    EXCISION RIGHT BUTTOCK WOUND AND CLOSURE performed by Lucy Vaughn MD at [address]:       Scheduled Meds:   amiodarone  200 mg Oral BID    sodium bicarbonate  1,300 mg Oral TID    morphine  1 mg IntraVENous Once    ondansetron  4 mg IntraVENous Once    midodrine  7.5 mg Oral TID WC    [Held by provider] bumetanide  1 mg Oral Daily    famotidine  20 mg PEG Tube Daily    folic acid  1 mg Oral Daily    [Held by provider] metoprolol tartrate  25 mg Oral BID    sertraline  25 mg Oral Daily    sodium chloride flush  10 mL IntraCATHeter TID    sodium hypochlorite   Irrigation BID    sodium chloride flush  5-40 mL IntraVENous 2 times per day    heparin (porcine)  5,000 Units SubCUTAneous 3 times per day     Continuous Infusions:   sodium chloride 40 mL/hr at 07/17/22 0800    sodium chloride      sodium chloride       PRN Meds:sodium chloride, acetaminophen, docusate sodium, ipratropium-albuterol, melatonin, sodium chloride flush, sodium chloride, ondansetron **OR** ondansetron, polyethylene glycol  Allergies:   ALLERGIES:    Patient has no known allergies. SOCIAL HISTORY:     TOBACCO:   reports that she has never smoked. She has never used smokeless tobacco.     ETOH:   reports no history of alcohol use.   Patient currently lives in a nursing home      FAMILY HISTORY:         Problem Relation Age of Onset    Diabetes Father     Arthritis Mother     COPD Mother     Diabetes Sister     Heart Disease Maternal Uncle     Breast Cancer Niece 36    Sleep Apnea Brother     Asthma Neg Hx     Birth Defects Neg Hx     Cancer Neg Hx     Depression Neg Hx     Early Death Neg Hx     Hearing Loss Neg Hx     High Blood Pressure Neg Hx     High Cholesterol Neg Hx     Kidney Disease Neg Hx     Learning Disabilities Neg Hx     Mental Illness Neg Hx     Mental Retardation Neg Hx     Miscarriages / Stillbirths Neg Hx     Stroke Neg Hx     Substance Abuse Neg Hx     Vision Loss Neg Hx     Other Neg Hx        REVIEW OF SYSTEMS:     Constitutional: no fever, no night sweats, no fatigue, no weight loss. Head: no head ache , no head injury, no migranes. Eye: no eye discharge, blurring of vision, no double vision,no eye pain. Ears: no hearing difficulty, no tinnitus  Mouth/throat: no ulceration, dental caries , dysphagia, no hoarseness and voice change  Respiratory: no cough no chest pain,no shortness of breath,no wheezing  CVS: no palpitation, no chest pain,   GI: no abdominal pain, no nausea , no vomiting, no constipation,no diarrhea. DARÍO: no dysuria, frequency and urgency, no hematuria, no kidney stones  Musculoskeletal: no joint pain, swelling , stiffness,  Endocrine: no polyuria, polydipsia, no cold or heat intolerance  Hematology: no anemia, no easy brusing or bleeding, no hx of clotting disorder  Dermatology: no skin rash, no skin lesions, no pruritis,  Neurological:no headaches,no dizziness, no seizure, no numbness. Psychiatry: no depression, no anxiety,no panic attacks, no suicide ideation    PHYSICAL EXAM:     BP (!) 116/56   Pulse 94   Temp 97.7 °F (36.5 °C) (Oral)   Resp 18   Ht 4' 9\" (1.448 m)   Wt 147 lb 12.8 oz (67 kg)   SpO2 98%   PF (!) 20 L/min   BMI 31.98 kg/m²   General apperance:  Awake, alert, not in distress. HEENT: pink conjunctiva, unicteric sclera, moist oral mucosa. Chest:  bilateral airo entry, diminished breath sound  Cardiovascular:  RRR ,S1S2, no murmur or gallop. Abdomen:  Soft, non tender to palpation.   Extremities: open wound on the coccyx ischial and lower extremites, off loading pillow noted  Skin:  Warm and dry. CNS:awake and oriented  Nephrostomy tube left side and seaman catheter noted  Has a tunelled HD catheter                              LABS:     CBC:   Recent Labs     07/15/22  0539 07/16/22 0425 07/17/22  0359   WBC 14.1* 22.3* 18.2*   HGB 8.2* 8.2* 7.9*    371 286     BMP:    Recent Labs     07/15/22  0539 07/16/22  0425 07/17/22  0359   * 136 139   K 3.8 3.9 3.7    107 108   CO2 17* 17* 17*   BUN 87* 87* 82*   CREATININE 3.0* 3.0* 3.0*   GLUCOSE 159* 143* 103     Calcium:  Recent Labs     07/17/22  0359   CALCIUM 8.1*     Ionized Calcium:No results for input(s): IONCA in the last 72 hours. Magnesium:  Recent Labs     07/14/22  2116   MG 1.6     Phosphorus:  Recent Labs     07/14/22  2116   PHOS 5.8*      Amylase and Lipase:  Recent Labs     07/16/22  0425   LACTA 1.1     Lactic Acid:   Recent Labs     07/16/22  0425   LACTA 1.1        UA:   Recent Labs     07/15/22  0700   SPECGRAV 1.014   PHUR 5.5   COLORU YELLOW   PROTEINU 100*   BLOODU LARGE*   RBCUA > 200   WBCUA 25-50   BACTERIA NONE SEEN   NITRU NEGATIVE   BILIRUBINUR NEGATIVE   UROBILINOGEN 0.2   KETUA NEGATIVE   LABCAST 4-8 HYALINE  NONE SEEN         IMAGING:    Micro:   Lab Results   Component Value Date/Time    BC No growth-preliminary No growth  07/11/2022 02:23 PM    BC No growth-preliminary No growth  07/11/2022 02:23 PM       Problem list of patient      Patient Active Problem List   Diagnosis Code    Essential hypertension I10    Chronic depression F32. A    CHF (congestive heart failure) (HCC) I50.9    Thrombocytopenia (HCC) D69.6    Moderate episode of recurrent major depressive disorder (HCC) F33.1    Uremia N19    Hyperkalemia E87.5    Isolated non-nephrotic proteinuria R80.0    ALVIN (acute kidney injury) ([facility] [address] N17.9    Chronic right-sided heart failure (HCC) I50.812    Pulmonary HTN (HCC) I27.20    Hypotension due to hypovolemia I95.89, E86.1    Acute renal failure superimposed on stage 4 chronic kidney disease (HCC) N17.9, N18.4    Pressure injury of sacral region, stage 4 (HCC) L89.154    Acute respiratory failure (HCC) J96.00    Flash pulmonary edema (HCC) J81.0    Shock (HCC) R57.9    Aspiration pneumonia of left lung (HCC) J69.0    Pleural effusion J90    Paroxysmal atrial fibrillation (HCC) I48.0    Hemoptysis R04.2    Chronic respiratory failure with hypoxia (HCC) J96.11    Pneumothorax, right J93.9    Collapse of left lung J98.11    Abnormal chest x-ray R93.89    Acute on chronic respiratory failure with hypoxia (HCC) J96.21    Retroperitoneal hematoma K66.1    HAP (hospital-acquired pneumonia) J18.9, Y95    Colostomy in place ([facility] [address] Z93.3    PEG (percutaneous endoscopic gastrostomy) status ([facility] [address] Z93.1    Intertriginous dermatitis associated with moisture L30.4    Peristomal dermatitis associated with moisture L30.8    Nephrostomy tube displaced ([facility] [address] T83.022A    Open wound of second toe of right foot S91.104A    Open wound of second toe of left foot C29.114D    Metabolic acidosis T09.0    Irritation around percutaneous endoscopic gastrostomy (PEG) tube site [facility]) K94.29    Kidney stone N20.0    Urinary tract infection associated with nephrostomy catheter ([facility]) T83.512A, N39.0    Hyponatremia E87.1    CKD (chronic kidney disease), stage IV (HCC) N18.4           Impression and Recommendation:   CKD: was on HD  Leukocytosis: concern for sepsis related to UTI. She has been on iv zosyn for 7 days. Will continue to monitor patient  Multiple decubitus wound: will continue local moist gauze dressing  Hx of nephrstomy tube for obstructing stone  Atrial fibrillation       Thank you Khang Cruz MD for allowing me to participate in this patient's care.     Mira Duran MD, MD,FACP 7/17/2022 11:17 AM

## 2022-07-17 NOTE — PROGRESS NOTES
[facility]   Nephrology progress note  7/17/2022, 11:02 AM      Pt Name:    Mary Ferrara  MRN:     [id number]     [place]:    1950  Admit Date:    7/11/2022  8:49 AM    Chief Complaint: Nephrology following for ALVIN/CKD     Subjective:  Patient seen and examined  No chest pain or shortness of breath  Does not offer much  On IV normal saline at 40 ml/hr  No shortness of breath  No chest pain    Objective:  24HR INTAKE/OUTPUT:      Intake/Output Summary (Last 24 hours) at 7/17/2022 1102  Last data filed at 7/17/2022 0421  Gross per 24 hour   Intake 862.43 ml   Output 895 ml   Net -32.57 ml        Admission weight: 141 lb 3.2 oz (64 kg)  Wt Readings from Last 3 Encounters:   07/17/22 147 lb 12.8 oz (67 kg)   06/24/22 140 lb (63.5 kg)   05/31/22 140 lb (63.5 kg)        Vitals :   Vitals:    07/16/22 2339 07/17/22 0419 07/17/22 0600 07/17/22 0845   BP: (!) 109/56 (!) 112/52  (!) 116/56   Pulse: 85 88  94   Resp: 18 18  18   Temp: 97.9 °F (36.6 °C) 97.9 °F (36.6 °C)  97.7 °F (36.5 °C)   TempSrc: Oral Oral  Oral   SpO2: 93% 95%  98%   Weight:   147 lb 12.8 oz (67 kg)    Height:       PF:           Physical examination  General Appearance: Comfortable, no acute distress  Mouth/Throat:  Oral mucosa moist  Neck:  Supple, no JVD  Lungs: Diminished air entry at the bases, poor effort, no rales  Heart[de-identified]  S1,S2 heard  Abdomen:  Soft, non - tender  Ext: + trace edema    Medications:  Infusion:    sodium chloride 40 mL/hr at 07/17/22 0800    sodium chloride      sodium chloride       Meds:    amiodarone  200 mg Oral BID    sodium bicarbonate  1,300 mg Oral TID    morphine  1 mg IntraVENous Once    ondansetron  4 mg IntraVENous Once    midodrine  7.5 mg Oral TID WC    [Held by provider] bumetanide  1 mg Oral Daily    famotidine  20 mg PEG Tube Daily    folic acid  1 mg Oral Daily    [Held by provider] metoprolol tartrate  25 mg Oral BID    sertraline  25 mg Oral Daily    sodium chloride flush  10 mL IntraCATHeter TID    sodium hypochlorite   Irrigation BID    sodium chloride flush  5-40 mL IntraVENous 2 times per day    heparin (porcine)  5,000 Units SubCUTAneous 3 times per day     Meds prn: sodium chloride, acetaminophen, docusate sodium, ipratropium-albuterol, melatonin, sodium chloride flush, sodium chloride, ondansetron **OR** ondansetron, polyethylene glycol     Lab Data :  CBC:   Recent Labs     07/15/22  0539 07/16/22 0425 07/17/22 0359   WBC 14.1* 22.3* 18.2*   HGB 8.2* 8.2* 7.9*   HCT 31.1* 30.0* 28.9*    371 286       CMP:  Recent Labs     07/14/22  2116 07/15/22  0539 07/16/22  0425 07/17/22  0359    134* 136 139   K 3.7 3.8 3.9 3.7    103 107 108   CO2 17* 17* 17* 17*   BUN 89* 87* 87* 82*   CREATININE 3.3* 3.0* 3.0* 3.0*   GLUCOSE 199* 159* 143* 103   CALCIUM 8.7 8.3* 8.5 8.1*   MG 1.6  --   --   --    PHOS 5.8*  --   --   --        Hepatic: No results for input(s): LABALBU, AST, ALT, ALB, BILITOT, ALKPHOS in the last 72 hours. Assessment and Plan:  ALVIN on CKD  Has not had dialysis in last several days  Serum creatinine is staying stable around 3.0  BUN also improving  No signs or symptoms of uremia  No acute need for dialysis    2. Metabolic acidosis: Continue with oral sodium bicarbonate-started yesterday  3. Mild hyponatremia. Improved  4. Leukocytosis  5. 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 Status post nephrostomy tube placement 7/13/2022  6. Hypotension on midodrine       Mandy Mancera MD  [facility]    This report has been created using voice recognition software.  It may contain minor errors which are inherent in voice recognition technology

## 2022-07-17 NOTE — PLAN OF CARE
Problem: Chronic Conditions and Co-morbidities  Goal: Patient's chronic conditions and co-morbidity symptoms are monitored and maintained or improved  Outcome: Progressing  Flowsheets (Taken 7/16/2022 2051)  Care Plan - Patient's Chronic Conditions and Co-Morbidity Symptoms are Monitored and Maintained or Improved: Monitor and assess patient's chronic conditions and comorbid symptoms for stability, deterioration, or improvement     Problem: Safety - Adult  Goal: Free from fall injury  Outcome: Progressing  Flowsheets (Taken 7/17/2022 0020)  Free From Fall Injury: Instruct family/caregiver on patient safety     Problem: ABCDS Injury Assessment  Goal: Absence of physical injury  Outcome: Progressing  Flowsheets (Taken 7/17/2022 0020)  Absence of Physical Injury: Implement safety measures based on patient assessment     Problem: Skin/Tissue Integrity  Goal: Absence of new skin breakdown  Description: 1. Monitor for areas of redness and/or skin breakdown  2. Assess vascular access sites hourly  3. Every 4-6 hours minimum:  Change oxygen saturation probe site  4. Every 4-6 hours:  If on nasal continuous positive airway pressure, respiratory therapy assess nares and determine need for appliance change or resting period. Outcome: Progressing     Problem: Discharge Planning  Goal: Discharge to home or other facility with appropriate resources  Outcome: Progressing  Flowsheets (Taken 7/16/2022 2051)  Discharge to home or other facility with appropriate resources: Identify barriers to discharge with patient and caregiver     Problem: Nutrition Deficit:  Goal: Optimize nutritional status  Outcome: Progressing     Problem: Pain  Goal: Verbalizes/displays adequate comfort level or baseline comfort level  Outcome: Progressing     Care plan reviewed with patient. Patient verbalizes understanding of the plan of care and contribute to goal setting.

## 2022-07-17 NOTE — PROGRESS NOTES
Progress Note    Patient:  Bindu Tovar    Unit/Bed:3B-32/032-A  YOB: 1950  MRN: [id number]   Acct: [de-identified]   Admit date: 7/11/2022      Principal Problem:    Kidney stone  Active Problems:    Urinary tract infection associated with nephrostomy catheter (HCC)    Hyponatremia    CKD (chronic kidney disease), stage IV ([address]  Resolved Problems:    * No resolved hospital problems.  *    Disposition continue monitoring in stepdown unit for rate control and continue with remote monitoring patient's hypotension appears to have resolved in previous 2 days, patient has a hospice meeting tomorrow I was able to update current medical interventions only to medical power of   Patient unlikely tolerating previous tube feed rate therefore I will restart feeds today at approximate 10 mL an hour for 12 hours only and reassess tomorrow      Assessment and Plan:  Atrial fibrillation with RVR, rate controlled cardiology assistance appreciated patient on p.o. amiodarone starting today other than atrial fibrillation no other acute arrhythmias noted on telemetry normotensive  Sepsis from suspected catheter associated urinary tract infection, patient appears to have recurrent urinary tract infections and is prone to these and with multidrug resistant organisms, this is approximately day 6 of Zosyn I will follow infectious disease recommendations she has not had any myesha fevers overnight I will continue this for now and perform further chart review she appears to have staghorn calculi and nephrostomy tubes in place she appears to be a poor surgical candidate for urologic directed removal of staghorn calculi  Chronic kidney disease stage III-IV with  Acute kidney injury we will follow nephrology recommendations continue IV isotonic fluids at current rate and continue bicarbonate offset acidosis with chronic kidney disease  Stage IV decubitus ulceration, follow wound care recommendations bicarbonate  1,300 mg Oral TID    morphine  1 mg IntraVENous Once    ondansetron  4 mg IntraVENous Once    midodrine  7.5 mg Oral TID WC    [Held by provider] bumetanide  1 mg Oral Daily    famotidine  20 mg PEG Tube Daily    folic acid  1 mg Oral Daily    [Held by provider] metoprolol tartrate  25 mg Oral BID    sertraline  25 mg Oral Daily    sodium chloride flush  10 mL IntraCATHeter TID    sodium hypochlorite   Irrigation BID    sodium chloride flush  5-40 mL IntraVENous 2 times per day    heparin (porcine)  5,000 Units SubCUTAneous 3 times per day     Continuous Infusions:   sodium chloride 40 mL/hr at 07/17/22 0800    sodium chloride      sodium chloride       PRN Meds:sodium chloride, acetaminophen, docusate sodium, ipratropium-albuterol, melatonin, sodium chloride flush, sodium chloride, ondansetron **OR** ondansetron, polyethylene glycol    Objective:  CBC:   Recent Labs     07/15/22  0539 07/16/22  0425 07/17/22  0359   WBC 14.1* 22.3* 18.2*   HGB 8.2* 8.2* 7.9*    371 286     BMP:    Recent Labs     07/15/22  0539 07/16/22  0425 07/17/22  0359   * 136 139   K 3.8 3.9 3.7    107 108   CO2 17* 17* 17*   BUN 87* 87* 82*   CREATININE 3.0* 3.0* 3.0*   GLUCOSE 159* 143* 103     Calcium:  Recent Labs     07/17/22  0359   CALCIUM 8.1*     Ionized Calcium:No results for input(s): IONCA in the last 72 hours. Magnesium:  Recent Labs     07/14/22  2116   MG 1.6     Phosphorus:  Recent Labs     07/14/22 2116   PHOS 5.8*     BNP:No results for input(s): BNP in the last 72 hours. Glucose:No results for input(s): POCGLU in the last 72 hours. HgbA1C: No results for input(s): LABA1C in the last 72 hours. INR: No results for input(s): INR in the last 72 hours. Hepatic: No results for input(s): ALKPHOS, ALT, AST, PROT, BILITOT, BILIDIR, LABALBU in the last 72 hours.   Amylase and Lipase:  Recent Labs     07/16/22  0425   LACTA 1.1     Lactic Acid:   Recent Labs     07/16/22  0425   LACTA 1.1 Troponin: No results for input(s): CKTOTAL, CKMB, TROPONINT in the last 72 hours. BNP: No results for input(s): BNP in the last 72 hours. Lipids: No results for input(s): CHOL, TRIG, HDL, LDL, LDLCALC in the last 72 hours. ABGs:   Lab Results   Component Value Date/Time    PH 7.32 07/15/2022 04:22 AM    PCO2 34 07/15/2022 04:22 AM    PO2 69 07/15/2022 04:22 AM    HCO3 18 07/15/2022 04:22 AM    O2SAT 92 07/15/2022 04:22 AM           Radiology reports as per the Radiologist  Radiology: CT ABDOMEN PELVIS WO CONTRAST Additional Contrast? None    Result Date: 7/17/2022  PROCEDURE: CT ABDOMEN PELVIS WO CONTRAST CLINICAL INFORMATION: abdominal pain . COMPARISON: CT abdomen pelvis dated 4/12/2022. TECHNIQUE: Axial 5 mm CT images were obtained through the abdomen and pelvis. No contrast was given. Coronal and sagittal reconstructions were created. All CT scans at this facility use dose modulation, iterative reconstruction, and/or weight-based dosing when appropriate to reduce radiation dose to as low as reasonably achievable. FINDINGS:  There are small bilateral pleural effusions with right-sided effusion increased in size compared to prior exam. There is adjacent atelectasis which has increased in the interval. The heart is prominently enlarged, similar to prior exam. There is a percutaneous gastrostomy tube in place, stable compared to prior exam. The unopacified liver is unremarkable. There are rim calcified stones within the gallbladder, similar to prior exam. Adrenal glands are unremarkable. There is a percutaneous nephrostomy tube on the left which has been repositioned at a more inferior aspect of the left kidney compared to prior exam. There are prominent dystrophic calcifications in the left kidney, similar to prior exam. There is adjacent scarring  in stranding.  There is fat stranding adjacent to the proximal left ureter and renal pelvis, similar to prior exam. The right kidney is stable compared to prior exam. There are calcified granulomas in the spleen which is mildly prominent, unchanged compared to prior. The pancreas is within normal limits. No retroperitoneal or mesenteric lymphadenopathy is identified. Redemonstration of a colostomy, stable compared to prior exam. Small bowel appears within normal limits. The bladder is decompressed with Padilla catheter in place. The uterus and adnexa are unremarkable. No free fluid is identified. There is prominent subcutaneous edema in the abdominal wall, pelvic subcutis tissues and visualized upper extremities, increased compared to prior exam. There are stable degenerative changes of the spine. There is again noted to be a prominent sacral decubitus ulcer extending to the bone, similar to prior exam.      1. Small bilateral pleural effusions, increased on the right with increased adjacent atelectasis. 2. Redemonstration of left percutaneous nephrostomy tube which has been adjusted in position in the interval with stable dysmorphic left kidney with dystrophic calcifications. 3. Worsening anasarca. 4. Cholelithiasis. 5. Stable prominent sacral decubitus ulcer. **This report has been created using voice recognition software. It may contain minor errors which are inherent in voice recognition technology. ** Final report electronically signed by Dr. Ana Paula Graves MD on 7/17/2022 9:48 AM    XR INJ CONTRAST GASTRIC TUBE PERC    Result Date: 6/24/2022  PROCEDURE: XR INJ CONTRAST GASTRIC TUBE PERC CLINICAL INFORMATION: PEG replacement . COMPARISON: 1/14/2022. CT abdomen pelvis 4/12/2022. TECHNIQUE: Initially a supine KUB was obtained. A 2nd supine KUB was taken after injecting Gastrografin through the existing gastrostomy tube. FINDINGS: On  image, there is a gastrostomy tube. The tip is superimposed over the mid abdomen. There is a nephrostomy tube in the left kidney. There are no distended bowel loops. There is a tube superimposed over the pelvis.  After injection of Gastrografin, contrast is seen in the stomach, duodenal bulb and a proximal small bowel loop. The tip is within the stomach. G-tube tip within the stomach. **This report has been created using voice recognition software. It may contain minor errors which are inherent in voice recognition technology. ** Final report electronically signed by Dr. Yi Garcia on 6/24/2022 7:17 AM    IR GUIDED NEPHROSTOMY CATH EXCHANGE    Result Date: 7/13/2022  NEPHROSTOMY TUBE EXCHANGE: PERFORMED BY: Margy Carson M.D. CLINICAL INFORMATION: Ureteral obstruction. Staghorn calculi. INDWELLING CATHETER: 8 [place] nephrostomy tube. NEW CATHETER: 8 [place] nephrostomy tube. FLUOROSCOPY TIME: 1.2 minutes FLUOROSCOPIC IMAGES: 5 ESTIMATED BLOOD LOSS: Minimal SEDATION: Versed 0.5 mg; Dilaudid 0.5 mg, IV; the patient was sedated during this procedure,  and monitored with EKG and pulse ox monitoring devices by a registered nurse. Face-to-face time with patient 10 minutes. PROCEDURE:  Signed informed consent was obtained prior to performing this procedure. Exposed portions of the nephrostomy tube and surrounding skin were prepped and draped in a sterile fashion. Iodinated contrast was injected and a nephrostogram was performed, revealing the nephrostomy tube to be in good position and functioning well. Fluoroscopic images were obtained for documentation. The indwelling nephrostomy tube was then removed over a guidewire and a new nephrostomy tube, as listed above, was advanced over the Glidewire with the tip coiled in the renal pelvis. . This was performed with fluoroscopic guidance. Fluoroscopic images were obtained for documentation. A Repeat nephrostogram was performed, confirming the catheter to be in good position and functioning well. A small amount of antibiotic ointment was applied to the  wound site. The catheter was stabilized to the skin with a 2-0 silk suture and a split gauze dressing. ..      1. Status post successful nephrostomy tube exchange. 2. Patient will be tentatively scheduled for a repeat routine nephrostomy tube exchange in the next 3-4 months. **This report has been created using voice recognition software. It may contain minor errors which are inherent in voice recognition technology. ** Final report electronically signed by Dr. Lamine Sharpe on 7/13/2022 9:06 AM        Physical Exam:  Vitals: BP (!) 107/57   Pulse (!) 105   Temp 97.6 °F (36.4 °C) (Oral)   Resp 22   Ht 4' 9\" (1.448 m)   Wt 147 lb 12.8 oz (67 kg)   SpO2 98%   PF (!) 20 L/min   BMI 31.98 kg/m²   24 hour intake/output:  Intake/Output Summary (Last 24 hours) at 7/17/2022 1430  Last data filed at 7/17/2022 0421  Gross per 24 hour   Intake 862.43 ml   Output 895 ml   Net -32.57 ml     Last 3 weights: Wt Readings from Last 3 Encounters:   07/17/22 147 lb 12.8 oz (67 kg)   06/24/22 140 lb (63.5 kg)   05/31/22 140 lb (63.5 kg)       General appearance - alert, awake appears to be in no acute distress  HEENT: Atraumatic normocephalic, no JVD. Trachea midline.   Chest - Bilateral air entry, no wheezes, crackles or rhonchi  Cardiovascular -irregularly irregular  Abdomen -on initial physical survey patient appears to have a PEG tube in place without leakage of feeds, also appears of colostomy bag in place left lower quadrant  neurological - non focal, no neurological deficits noted  Integumentary - Skin color, texture, turgor normal. No Rashes or lesions  Musculoskeletal -Full ROM, No clubbing or cyanosis  Extremities: Bilateral lower extremity occlusive dressings anterior tibia secondary to skin breakdown, posterior sacral decubital ulcers with areas of skin breakdown right posterior thorax area skin breakdown with small amount of pus  Psychiatric, flat affect    DVT prophylaxis: [] Lovenox                                 [] SCDs                                 [x] SQ Heparin                                 [] Encourage ambulation           [] Already on Anticoagulation               Electronically signed by Ren Hand MD on 7/17/2022 at 2:30 PM    Rounding Hospitalist

## 2022-07-17 NOTE — PLAN OF CARE
Problem: Chronic Conditions and Co-morbidities  Goal: Patient's chronic conditions and co-morbidity symptoms are monitored and maintained or improved  7/17/2022 0907 by Elijah Hicks RN  Outcome: Progressing  Flowsheets (Taken 7/17/2022 0845)  Care Plan - Patient's Chronic Conditions and Co-Morbidity Symptoms are Monitored and Maintained or Improved:   Monitor and assess patient's chronic conditions and comorbid symptoms for stability, deterioration, or improvement   Collaborate with multidisciplinary team to address chronic and comorbid conditions and prevent exacerbation or deterioration  7/17/2022 0031 by Puneet Santiago RN  Outcome: Progressing  Flowsheets (Taken 7/16/2022 2051)  Care Plan - Patient's Chronic Conditions and Co-Morbidity Symptoms are Monitored and Maintained or Improved: Monitor and assess patient's chronic conditions and comorbid symptoms for stability, deterioration, or improvement     Problem: Safety - Adult  Goal: Free from fall injury  7/17/2022 0907 by Elijah Hicks RN  Outcome: Progressing  7/17/2022 0031 by Puneet Santiago RN  Outcome: Progressing  Flowsheets (Taken 7/17/2022 0020)  Free From Fall Injury: Instruct family/caregiver on patient safety     Problem: ABCDS Injury Assessment  Goal: Absence of physical injury  7/17/2022 0907 by Elijah Hicks RN  Outcome: Progressing  Flowsheets (Taken 7/17/2022 0906)  Absence of Physical Injury: Implement safety measures based on patient assessment  7/17/2022 0031 by Puneet Santiago RN  Outcome: Progressing  Flowsheets (Taken 7/17/2022 0020)  Absence of Physical Injury: Implement safety measures based on patient assessment     Problem: Skin/Tissue Integrity  Goal: Absence of new skin breakdown  Description: 1. Monitor for areas of redness and/or skin breakdown  2. Assess vascular access sites hourly  3. Every 4-6 hours minimum:  Change oxygen saturation probe site  4.   Every 4-6 hours:  If on nasal continuous positive airway pressure, respiratory therapy assess nares and determine need for appliance change or resting period.   7/17/2022 0907 by Hayley Morin RN  Outcome: Progressing  7/17/2022 0031 by Katherine Boo RN  Outcome: Progressing     Problem: Discharge Planning  Goal: Discharge to home or other facility with appropriate resources  7/17/2022 0907 by aHyley Morin RN  Outcome: Progressing  Flowsheets (Taken 7/17/2022 0845)  Discharge to home or other facility with appropriate resources:   Identify barriers to discharge with patient and caregiver   Arrange for needed discharge resources and transportation as appropriate   Identify discharge learning needs (meds, wound care, etc)  7/17/2022 0031 by Katherine Boo RN  Outcome: Progressing  Flowsheets (Taken 7/16/2022 2051)  Discharge to home or other facility with appropriate resources: Identify barriers to discharge with patient and caregiver     Problem: Nutrition Deficit:  Goal: Optimize nutritional status  7/17/2022 0907 by Hayley Morin RN  Outcome: Progressing  7/17/2022 0031 by Katherine Boo RN  Outcome: Progressing     Problem: Pain  Goal: Verbalizes/displays adequate comfort level or baseline comfort level  7/17/2022 0907 by Hayley Morin RN  Outcome: Progressing  7/17/2022 0031 by Katherine Boo RN  Outcome: Progressing   Educated

## 2022-07-18 LAB
AEROBIC CULTURE: ABNORMAL
ANAEROBIC CULTURE: ABNORMAL
ANION GAP SERPL CALCULATED.3IONS-SCNC: 15 MEQ/L (ref 8–16)
BUN BLDV-MCNC: 85 MG/DL (ref 7–22)
CALCIUM SERPL-MCNC: 8.6 MG/DL (ref 8.5–10.5)
CHLORIDE BLD-SCNC: 108 MEQ/L (ref 98–111)
CO2: 17 MEQ/L (ref 23–33)
CREAT SERPL-MCNC: 3.3 MG/DL (ref 0.4–1.2)
EKG ATRIAL RATE: 100 BPM
EKG P AXIS: 56 DEGREES
EKG P-R INTERVAL: 188 MS
EKG Q-T INTERVAL: 346 MS
EKG QRS DURATION: 94 MS
EKG QTC CALCULATION (BAZETT): 446 MS
EKG R AXIS: 20 DEGREES
EKG T AXIS: 41 DEGREES
EKG VENTRICULAR RATE: 100 BPM
ERYTHROCYTE [DISTWIDTH] IN BLOOD BY AUTOMATED COUNT: 17.9 % (ref 11.5–14.5)
ERYTHROCYTE [DISTWIDTH] IN BLOOD BY AUTOMATED COUNT: 62.1 FL (ref 35–45)
GFR SERPL CREATININE-BSD FRML MDRD: 14 ML/MIN/1.73M2
GLUCOSE BLD-MCNC: 66 MG/DL (ref 70–108)
GLUCOSE BLD-MCNC: 88 MG/DL (ref 70–108)
GLUCOSE BLD-MCNC: 88 MG/DL (ref 70–108)
GLUCOSE BLD-MCNC: 96 MG/DL (ref 70–108)
GLUCOSE BLD-MCNC: 97 MG/DL (ref 70–108)
GRAM STAIN RESULT: ABNORMAL
HCT VFR BLD CALC: 30.2 % (ref 37–47)
HEMOGLOBIN: 8.2 GM/DL (ref 12–16)
LV EF: 55 %
LVEF MODALITY: NORMAL
MCH RBC QN AUTO: 25.9 PG (ref 26–33)
MCHC RBC AUTO-ENTMCNC: 27.2 GM/DL (ref 32.2–35.5)
MCV RBC AUTO: 95.3 FL (ref 81–99)
ORGANISM: ABNORMAL
ORGANISM: ABNORMAL
PLATELET # BLD: 343 THOU/MM3 (ref 130–400)
PMV BLD AUTO: 8.5 FL (ref 9.4–12.4)
POTASSIUM SERPL-SCNC: 3.9 MEQ/L (ref 3.5–5.2)
RBC # BLD: 3.17 MILL/MM3 (ref 4.2–5.4)
SODIUM BLD-SCNC: 140 MEQ/L (ref 135–145)
WBC # BLD: 22 THOU/MM3 (ref 4.8–10.8)

## 2022-07-18 PROCEDURE — 6370000000 HC RX 637 (ALT 250 FOR IP): Performed by: INTERNAL MEDICINE

## 2022-07-18 PROCEDURE — 6370000000 HC RX 637 (ALT 250 FOR IP): Performed by: NURSE PRACTITIONER

## 2022-07-18 PROCEDURE — 92612 ENDOSCOPY SWALLOW (FEES) VID: CPT

## 2022-07-18 PROCEDURE — 93005 ELECTROCARDIOGRAM TRACING: CPT | Performed by: NURSE PRACTITIONER

## 2022-07-18 PROCEDURE — 2580000003 HC RX 258: Performed by: INTERNAL MEDICINE

## 2022-07-18 PROCEDURE — 6360000002 HC RX W HCPCS: Performed by: UROLOGY

## 2022-07-18 PROCEDURE — 80048 BASIC METABOLIC PNL TOTAL CA: CPT

## 2022-07-18 PROCEDURE — 93306 TTE W/DOPPLER COMPLETE: CPT

## 2022-07-18 PROCEDURE — 6370000000 HC RX 637 (ALT 250 FOR IP): Performed by: UROLOGY

## 2022-07-18 PROCEDURE — 36415 COLL VENOUS BLD VENIPUNCTURE: CPT

## 2022-07-18 PROCEDURE — 99232 SBSQ HOSP IP/OBS MODERATE 35: CPT | Performed by: INTERNAL MEDICINE

## 2022-07-18 PROCEDURE — 85027 COMPLETE CBC AUTOMATED: CPT

## 2022-07-18 PROCEDURE — 6370000000 HC RX 637 (ALT 250 FOR IP)

## 2022-07-18 PROCEDURE — 82948 REAGENT STRIP/BLOOD GLUCOSE: CPT

## 2022-07-18 PROCEDURE — 1200000003 HC TELEMETRY R&B

## 2022-07-18 PROCEDURE — 6360000002 HC RX W HCPCS: Performed by: INTERNAL MEDICINE

## 2022-07-18 PROCEDURE — 99232 SBSQ HOSP IP/OBS MODERATE 35: CPT | Performed by: NURSE PRACTITIONER

## 2022-07-18 PROCEDURE — 93010 ELECTROCARDIOGRAM REPORT: CPT | Performed by: INTERNAL MEDICINE

## 2022-07-18 PROCEDURE — 2580000003 HC RX 258: Performed by: UROLOGY

## 2022-07-18 PROCEDURE — 2580000003 HC RX 258

## 2022-07-18 PROCEDURE — 6370000000 HC RX 637 (ALT 250 FOR IP): Performed by: NUCLEAR MEDICINE

## 2022-07-18 PROCEDURE — 92526 ORAL FUNCTION THERAPY: CPT

## 2022-07-18 RX ORDER — AMIODARONE HYDROCHLORIDE 200 MG/1
200 TABLET ORAL DAILY
Status: DISCONTINUED | OUTPATIENT
Start: 2022-07-22 | End: 2022-07-21 | Stop reason: HOSPADM

## 2022-07-18 RX ORDER — DEXTROSE MONOHYDRATE 50 MG/ML
100 INJECTION, SOLUTION INTRAVENOUS PRN
Status: DISCONTINUED | OUTPATIENT
Start: 2022-07-18 | End: 2022-07-21 | Stop reason: HOSPADM

## 2022-07-18 RX ORDER — MORPHINE SULFATE 2 MG/ML
1 INJECTION, SOLUTION INTRAMUSCULAR; INTRAVENOUS AS NEEDED
Status: DISCONTINUED | OUTPATIENT
Start: 2022-07-18 | End: 2022-07-21 | Stop reason: HOSPADM

## 2022-07-18 RX ORDER — HYDROCODONE BITARTRATE AND ACETAMINOPHEN 5; 325 MG/1; MG/1
1 TABLET ORAL ONCE
Status: COMPLETED | OUTPATIENT
Start: 2022-07-18 | End: 2022-07-18

## 2022-07-18 RX ADMIN — HEPARIN SODIUM 5000 UNITS: 5000 INJECTION INTRAVENOUS; SUBCUTANEOUS at 06:39

## 2022-07-18 RX ADMIN — FOLIC ACID 1 MG: 1 TABLET ORAL at 08:30

## 2022-07-18 RX ADMIN — FAMOTIDINE 20 MG: 20 TABLET ORAL at 08:30

## 2022-07-18 RX ADMIN — AMIODARONE HYDROCHLORIDE 200 MG: 200 TABLET ORAL at 20:28

## 2022-07-18 RX ADMIN — MIDODRINE HYDROCHLORIDE 7.5 MG: 2.5 TABLET ORAL at 12:39

## 2022-07-18 RX ADMIN — SODIUM BICARBONATE 1300 MG: 650 TABLET ORAL at 12:39

## 2022-07-18 RX ADMIN — HYDROCODONE BITARTRATE AND ACETAMINOPHEN 1 TABLET: 5; 325 TABLET ORAL at 15:40

## 2022-07-18 RX ADMIN — AMIODARONE HYDROCHLORIDE 200 MG: 200 TABLET ORAL at 08:30

## 2022-07-18 RX ADMIN — SODIUM CHLORIDE: 9 INJECTION, SOLUTION INTRAVENOUS at 12:37

## 2022-07-18 RX ADMIN — HEPARIN SODIUM 5000 UNITS: 5000 INJECTION INTRAVENOUS; SUBCUTANEOUS at 15:00

## 2022-07-18 RX ADMIN — DAKIN'S SOLUTION 0.125% (QUARTER STRENGTH): 0.12 SOLUTION at 21:04

## 2022-07-18 RX ADMIN — DEXTROSE MONOHYDRATE 125 ML: 100 INJECTION, SOLUTION INTRAVENOUS at 07:00

## 2022-07-18 RX ADMIN — MIDODRINE HYDROCHLORIDE 7.5 MG: 2.5 TABLET ORAL at 08:28

## 2022-07-18 RX ADMIN — METOPROLOL TARTRATE 25 MG: 25 TABLET, FILM COATED ORAL at 21:00

## 2022-07-18 RX ADMIN — SODIUM CHLORIDE, PRESERVATIVE FREE 10 ML: 5 INJECTION INTRAVENOUS at 12:39

## 2022-07-18 RX ADMIN — SODIUM CHLORIDE, PRESERVATIVE FREE 10 ML: 5 INJECTION INTRAVENOUS at 20:28

## 2022-07-18 RX ADMIN — HEPARIN SODIUM 5000 UNITS: 5000 INJECTION INTRAVENOUS; SUBCUTANEOUS at 21:05

## 2022-07-18 RX ADMIN — SODIUM CHLORIDE, PRESERVATIVE FREE 10 ML: 5 INJECTION INTRAVENOUS at 20:29

## 2022-07-18 RX ADMIN — MORPHINE SULFATE 1 MG: 2 INJECTION, SOLUTION INTRAMUSCULAR; INTRAVENOUS at 17:25

## 2022-07-18 RX ADMIN — SODIUM BICARBONATE 1300 MG: 650 TABLET ORAL at 20:29

## 2022-07-18 RX ADMIN — MIDODRINE HYDROCHLORIDE 7.5 MG: 2.5 TABLET ORAL at 17:27

## 2022-07-18 RX ADMIN — SODIUM BICARBONATE 1300 MG: 650 TABLET ORAL at 08:32

## 2022-07-18 RX ADMIN — SERTRALINE 25 MG: 50 TABLET, FILM COATED ORAL at 08:28

## 2022-07-18 RX ADMIN — SODIUM CHLORIDE, PRESERVATIVE FREE 10 ML: 5 INJECTION INTRAVENOUS at 08:32

## 2022-07-18 RX ADMIN — DAKIN'S SOLUTION 0.125% (QUARTER STRENGTH): 0.12 SOLUTION at 00:00

## 2022-07-18 NOTE — CARE COORDINATION
7/18/22, 7:14 AM EDT    DISCHARGE BARRIERS        Patient transferred to  [id number]. Report given to unit SWCristal, regarding discharge plan for this patient.

## 2022-07-18 NOTE — PROGRESS NOTES
Renal Progress Note  [facility]    Patient :  Francine Loza; 73 y.o. MRN# [id number]  Location:  3B-32/032-A  Attending:  Ren Hand MD  Admit Date:  7/11/2022   Hospital Day: 3      Subjective:     Nephrology is following the patient for ALVIN on CKD. Patient is doing well. She denies any shortness of breath, chest pain, abdominal pain, headache. She states she is feeling better. Patient is on O2 nasal cannula. She has not received dialysis during her hospital stay and when asked what her dialysis schedule is she says \"it was a long time ago\". Cr increased to 3.3. Outpatient Medications:     Medications Prior to Admission: buPROPion (WELLBUTRIN) 75 MG tablet, Take 75 mg by mouth daily  traMADol (ULTRAM) 50 MG tablet, Take 50 mg by mouth 2 times daily as needed for Pain (moderate to severe pain). sertraline (ZOLOFT) 50 MG tablet, Take 25 mg by mouth daily   bumetanide (BUMEX) 1 MG tablet, Take 2 mg by mouth daily   metoprolol tartrate (LOPRESSOR) 25 MG tablet, Take 1 tablet by mouth 2 times daily  ipratropium-albuterol (DUONEB) 0.5-2.5 (3) MG/3ML SOLN nebulizer solution, Inhale 3 mLs into the lungs every 4 hours as needed for Shortness of Breath  sodium chloride 0.9 % SOLN, Inject as directed Flush TID nephrostomy tube 10ml. miconazole (MICOTIN) 2 % powder, Apply topically daily as needed for Itching Apply topically 2 times daily.   silver nitrate applicators 22-09 % applicator, Apply 1 each topically Once a week on Friday and PRN to prowed flesh at peg site  folic acid (FOLVITE) 1 MG tablet, Take 1 tablet by mouth daily  melatonin 3 MG TABS tablet, Take 2 tablets by mouth nightly as needed (anxiety, sleep)  [DISCONTINUED] ferrous sulfate (IRON 325) 325 (65 Fe) MG tablet, Take 1 tablet by mouth every other day (Patient not taking: Reported on 7/11/2022)  famotidine (PEPCID) 20 MG tablet, 20 mg by PEG Tube route daily  senna (SENOKOT) 8.8 MG/5ML SYRP syrup, Take 5 mLs by mouth Vital Signs:   Temperature:  Temp: 98.1 °F (36.7 °C)  TMax:   Temp (24hrs), Av.5 °F (36.9 °C), Min:98 °F (36.7 °C), Max:99.1 °F (37.3 °C)    Respirations:  Resp: 20  Pulse:   Heart Rate: (!) 107  BP:    BP: 124/62  BP Range: Systolic (39AVD), ZF , Min:109 , TB       Diastolic (19ERX), AQY:97, Min:55, Max:76      Physical Examination:     General:  Awake, alert, no acute distress  HEENT: NC/AT/ MMM  Chest:              Decreased effort, no rales or rhonchi  Cardiac:  S1 S2 appreciated, no murmurs, rubs, and gallops  Abdomen: Soft, non-tender,  Neuro:              No facial droop, No Asterixis  SKIN:  No rashes, good skin turgor. Extremities:  No edema, no cyanosis    Labs:       Recent Labs     22  03522  0440   WBC 22.3* 18.2* 22.0*   RBC 3.13* 3.02* 3.17*   HGB 8.2* 7.9* 8.2*   HCT 30.0* 28.9* 30.2*   MCV 95.8 95.7 95.3   MCH 26.2 26.2 25.9*   MCHC 27.3* 27.3* 27.2*    286 343   MPV 8.3* 8.8* 8.5*      BMP:   Recent Labs     22  04222  0359 22  0440    139 140   K 3.9 3.7 3.9    108 108   CO2 17* 17* 17*   BUN 87* 82* 85*   CREATININE 3.0* 3.0* 3.3*   GLUCOSE 143* 103 66*   CALCIUM 8.5 8.1* 8.6      Phosphorus:   No results for input(s): PHOS in the last 72 hours. Magnesium:  No results for input(s): MG in the last 72 hours. Albumin:  No results for input(s): LABALBU in the last 72 hours.   BNP:      Lab Results   Component Value Date/Time    BNP 23 10/04/2017 10:37 AM     RYLAN:    No results found for: RYLAN  SPEP:  Lab Results   Component Value Date/Time    PROT 6.0 2022 05:38 AM     UPEP:   No results found for: LABPE  C3:     Lab Results   Component Value Date/Time    C3 93 10/25/2021 11:37 AM     C4:     Lab Results   Component Value Date/Time    C4 22 10/25/2021 11:37 AM     MPO ANCA:   No results found for: MPO  PR3 ANCA:   No results found for: PR3  Anti-GBM:   No results found for: GBMABIGG  Hep BsAg:         Lab Results   Component Value Date/Time    HEPBSAG Negative 04/21/2022 03:26 PM     Hep C AB:          Lab Results   Component Value Date/Time    HEPCAB Negative 04/21/2022 03:26 PM       Urinalysis/Chemistries:      Lab Results   Component Value Date/Time    NITRU NEGATIVE 07/15/2022 07:00 AM    COLORU YELLOW 07/15/2022 07:00 AM    PHUR 5.5 07/15/2022 07:00 AM    LABCAST 4-8 HYALINE 07/15/2022 07:00 AM    LABCAST NONE SEEN 07/15/2022 07:00 AM    WBCUA 25-50 07/15/2022 07:00 AM    RBCUA > 200 07/15/2022 07:00 AM    MUCUS OBSCURED 01/19/2022 09:15 AM    MUCUS NONE SEEN 01/19/2022 09:15 AM    YEAST NONE SEEN 07/15/2022 07:00 AM    BACTERIA NONE SEEN 07/15/2022 07:00 AM    SPECGRAV 1.014 07/15/2022 07:00 AM    LEUKOCYTESUR SMALL 07/15/2022 07:00 AM    LEUKOCYTESUR MODERATE 04/12/2022 06:30 AM    UROBILINOGEN 0.2 07/15/2022 07:00 AM    BILIRUBINUR NEGATIVE 07/15/2022 07:00 AM    BLOODU LARGE 07/15/2022 07:00 AM    GLUCOSEU NEGATIVE 04/12/2022 06:30 AM    KETUA NEGATIVE 07/15/2022 07:00 AM    AMORPHOUS URATES 04/12/2022 06:30 AM     Urine Sodium:   No results found for: MARION  Urine Potassium:  No results found for: KUR  Urine Chloride:  No results found for: CLUR  Urine Osmolarity:   Lab Results   Component Value Date/Time    OSMOU 420 01/31/2022 10:50 AM     Urine Protein:   No components found for: TOTALPROTEIN, URINE   Urine Creatinine:     Lab Results   Component Value Date/Time    LABCREA 81.5 10/25/2021 08:00 AM     Urine Eosinophils:  No components found for: UEOS        Impression and Plan:  1. ALVIN on CKD -- Patient has not received dialysis in several days and is unsure about her dialysis schedule. Serum Cr is increased to 3.3 today. BUN is 85 and slightly up from yesterday but remains stable. Will continue to monitor. 2. Metabolic acidosis -- improved. Lactic acid at 1.1 on 7/16/22. CO2 stable at 17.      3. Electrolytes -- stable    Please don't hesitate to call with any questions.   Electronically signed by Bakari Robles DO on 7/18/2022 at 12:53 PM

## 2022-07-18 NOTE — PROGRESS NOTES
Urology Progress Note    Chief Complaint: nephrostomy tube exchange    Subjective: \"    Patient is resting in bed, voiding yellow urine via seaman, concentrated urine via left nephrostomy tube, colostomy draining, tolerating tube feeds, denies any nausea or vomiting. There are complaints of no pain at this time.             Vitals:  /72   Pulse (!) 120   Temp 99.1 °F (37.3 °C) (Oral)   Resp 21   Ht 4' 9\" (1.448 m)   Wt 147 lb 12.8 oz (67 kg)   SpO2 95%   PF (!) 20 L/min   BMI 31.98 kg/m²   Temp  Av.6 °F (37 °C)  Min: 98 °F (36.7 °C)  Max: 99.1 °F (37.3 °C)    Intake/Output Summary (Last 24 hours) at 2022 1223  Last data filed at 2022 0800  Gross per 24 hour   Intake 431 ml   Output 1035 ml   Net -604 ml       Social History     Socioeconomic History    Marital status: Single     Spouse name: Not on file    Number of children: 0    Years of education: Not on file    Highest education level: Not on file   Occupational History    Not on file   Tobacco Use    Smoking status: Never    Smokeless tobacco: Never   Vaping Use    Vaping Use: Never used   Substance and Sexual Activity    Alcohol use: No    Drug use: No    Sexual activity: Not Currently   Other Topics Concern    Not on file   Social History Narrative    Not on file     Social Determinants of Health     Financial Resource Strain: Not on file   Food Insecurity: Not on file   Transportation Needs: Not on file   Physical Activity: Not on file   Stress: Not on file   Social Connections: Not on file   Intimate Partner Violence: Not on file   Housing Stability: Not on file     Family History   Problem Relation Age of Onset    Diabetes Father     Arthritis Mother     COPD Mother     Diabetes Sister     Heart Disease Maternal Uncle     Breast Cancer Niece [address]    Sleep Apnea Brother     Asthma Neg Hx     Birth Defects Neg Hx     Cancer Neg Hx     Depression Neg Hx     Early Death Neg Hx     Hearing Loss Neg Hx     High Blood Pressure Neg Hx High Cholesterol Neg Hx     Kidney Disease Neg Hx     Learning Disabilities Neg Hx     Mental Illness Neg Hx     Mental Retardation Neg Hx     Miscarriages / Stillbirths Neg Hx     Stroke Neg Hx     Substance Abuse Neg Hx     Vision Loss Neg Hx     Other Neg Hx      No Known Allergies      Constitutional: Alert and oriented to person, place, time. No acute distress. Smiling  HEENT:   Head:              Normocephalic and atraumatic. Mucous membranes are dry  Eyes:              EOM are normal. No scleral icterus. Nose:              The external appearance of the nose is normal  Ears: The ears appear normal to external inspection. Cardiovascular:       fast, irregular rate  Pulmonary/Chest:  Normal respiratory rate and rhthym. No use of accessory muscles. Lungs diminished bilaterally  Abdominal:              Soft. No tenderness. Active bowel sounds. Colostomy draining. Left neph draining concentrated yellow urine  Genitalia:               Seaman catheter draining yellow urine  Neurological:              Alert and oriented. Labs:  WBC:    Lab Results   Component Value Date/Time    WBC 22.0 07/18/2022 04:40 AM     Hemoglobin/Hematocrit:    Lab Results   Component Value Date/Time    HGB 8.2 07/18/2022 04:40 AM    HCT 30.2 07/18/2022 04:40 AM     BMP:    Lab Results   Component Value Date/Time     07/18/2022 04:40 AM    K 3.9 07/18/2022 04:40 AM    K 4.1 03/14/2022 04:44 AM     07/18/2022 04:40 AM    CO2 17 07/18/2022 04:40 AM    BUN 85 07/18/2022 04:40 AM    LABALBU 1.9 04/16/2022 05:38 AM    CREATININE 3.3 07/18/2022 04:40 AM    CALCIUM 8.6 07/18/2022 04:40 AM    LABGLOM 14 07/18/2022 04:40 AM       Impression/Plan  Sepsis secondary to CAUTI- On Zosyn. Neph tube exchanged 7/13. Pt having tachycardia, lower bp, and low grade temp today. Exchange seaman catheter and obtain urine culture off new catheter.   Labs and blood cultures ordered per hospitalist.  Sheba Naranjo assistance  L staghorn calculus-managed chronically with nephrostomy tube with routine exchanges. Follows with Dr. Jeffry Hampton.   Plan every 2 month nephrostomy tube exchanges with admission prior to exchange for pre-procedural antibiotic therapy  Hypotension - resolved  Tachycardia  ALVIN on CKD requiring dialysis 2 x weekly for last 2-3 mos--no plans for dialysis per nephrology  Anemia  Dysphagia  Chronic HF  Decubitus ulcers with diverting colostomy  Anxiety     Having Afib now - transferred to   No fevers, BP stabilized over the weekend  Tolerating tube feeds     Urology will follow    LANE Simon - CNP, LANE  07/18/22 12:23 PM  Urology

## 2022-07-18 NOTE — PROGRESS NOTES
Cardiology Progress Note      Patient:  Sonido Cherry  YOB: 1950  MRN: [id number]   Acct: [de-identified]  Admit Date:  7/11/2022  Primary Cardiologist: Jacinta Clements MD  Seen by Dr. Kadeem Marin    Per prior cardiology consult note-  REASON FOR THE CONSULTATION:  Atrial fibrillation. REQUESTING PROVIDER:  Hospitalist Service. HISTORY OF PRESENT ILLNESS:  This is a pleasant 80-year-old patient who  is not necessarily the best historian who seemed to be somehow drowsy  and was not able to give much history. She apparently came in with some  kidney stone and worsening renal failure. She does have underlying  cardiorenal disease and she follows with [facility] to  start with for diastolic dysfunction. She apparently has had previous  catheterization with normal coronary arteries and some element of  diastolic dysfunction with renal insufficiency. Comes in with worsening  renal failure, kidney stone and has had some intermittent atrial  fibrillation on the EKG. She is not aware of any previous atrial  fibrillation or I could find any evidence in the chart. We are  consulted to assist in the management of the patient.     Subjective (Events in last 24 hours):   Pt in bed   She has no CP or SOB or palpitations   Noted to be in SR w/ PAC's     BP stable with midodrine   Creat 3.0 - stable      Objective:   /72   Pulse (!) 120   Temp 99.1 °F (37.3 °C) (Oral)   Resp 21   Ht 4' 9\" (1.448 m)   Wt 147 lb 12.8 oz (67 kg)   SpO2 95%   PF (!) 20 L/min   BMI 31.98 kg/m²        TELEMETRY: SR w/ PAC's 's    Physical Exam:  General Appearance: alert and oriented to person, place and time, in no acute distress  Cardiovascular: normal rate, regular rhythm, normal S1 and S2, no murmurs, rubs, clicks, or gallops, distal pulses intact,   Pulmonary/Chest: clear to auscultation bilaterally- no wheezes, rales or rhonchi, normal air movement, no respiratory distress  Abdomen: soft, non-tender, non-distended, normal bowel sounds, no masses Extremities: no cyanosis, clubbing or edema, pulses weak - LE in wraps    Skin: warm and dry, no rash or erythema  Musculoskeletal: normal range of motion, no joint swelling, deformity or tenderness  Neurological: alert, oriented, normal speech, no focal findings or movement disorder noted    Medications:    amiodarone  200 mg Oral BID    sodium bicarbonate  1,300 mg Oral TID    morphine  1 mg IntraVENous Once    ondansetron  4 mg IntraVENous Once    midodrine  7.5 mg Oral TID WC    [Held by provider] bumetanide  1 mg Oral Daily    famotidine  20 mg PEG Tube Daily    folic acid  1 mg Oral Daily    [Held by provider] metoprolol tartrate  25 mg Oral BID    sertraline  25 mg Oral Daily    sodium chloride flush  10 mL IntraCATHeter TID    sodium hypochlorite   Irrigation BID    sodium chloride flush  5-40 mL IntraVENous 2 times per day    heparin (porcine)  5,000 Units SubCUTAneous 3 times per day      dextrose      sodium chloride 40 mL/hr at 07/17/22 0800    sodium chloride      sodium chloride       glucose, 4 tablet, PRN  dextrose bolus, 125 mL, PRN   Or  dextrose bolus, 250 mL, PRN  glucagon (rDNA), 1 mg, PRN  dextrose, 100 mL/hr, PRN  sodium chloride, , PRN  acetaminophen, 650 mg, Q8H PRN  docusate sodium, 100 mg, Daily PRN  ipratropium-albuterol, 3 mL, Q4H PRN  melatonin, 6 mg, Nightly PRN  sodium chloride flush, 5-40 mL, PRN  sodium chloride, , PRN  ondansetron, 4 mg, Q8H PRN   Or  ondansetron, 4 mg, Q6H PRN  polyethylene glycol, 17 g, Daily PRN        Diagnostics:    Echo: pending    Electronically signed by Carmelita Sampson MD (Interpreting   physician) on 10/28/2021 at 04:38 PM   ----------------------------------------------------------------      Findings      Mitral Valve   The mitral valve structure was normal with normal leaflet separation. DOPPLER: The transmitral velocity was within the normal range with no   evidence for mitral stenosis.  There was no evidence of mitral   regurgitation. Aortic Valve   The aortic valve was trileaflet with normal thickness and cuspal   separation. DOPPLER: Transaortic velocity was within the normal range with   no evidence of aortic stenosis. There was no evidence of aortic   regurgitation. Tricuspid Valve   The tricuspid valve structure was normal with normal leaflet separation. DOPPLER: There was no evidence of tricuspid stenosis. There was trace   tricuspid regurgitation. Pulmonic Valve   The pulmonic valve leaflets exhibited normal thickness, no calcification,   and normal cuspal separation. DOPPLER: The transpulmonic velocity was   within the normal range with no evidence for regurgitation. Left Atrium   Left atrial size was normal.      Left Ventricle   Normal left ventricular size and systolic function. There were no regional wall motion abnormalities. Wall thickness was within normal limits. Ejection fraction was estimated at 60-65%. Right Atrium   Right atrial size was normal.      Right Ventricle   The right ventricular size was normal with normal systolic function and   wall thickness. Pericardial Effusion   The pericardium was normal in appearance with a small, non-hemodynamically   significant pericardial effusion. Prominent pericardial fat pad. Pleural Effusion   No evidence of pleural effusion. Aorta / Great Vessels   IVC size is dilated with reduced respiratory phasic changes (CVP~10-15   mmHg). Left Heart Cath:   RIGHT HEART CATHETERIZATION:  RA 15, RV 81/21, PA 85/31 with a mean of  52, wedge pressure 18 mmHg, PA saturation 66%, RA saturation 66%,  cardiac output 3.4, cardiac index 1.8. CORONARY ANGIOGRAM:     LEFT MAIN:  Patent without any significant obstruction. LAD:  Mild luminal irregularities throughout the length of the LAD, but  no significant obstruction. It gives rise to a large diagonal branch  without any significant obstruction. LCX:  No significant obstruction. It gives rise to a large OM1 and OM2  systems without any significant obstruction. The AV groove branch has  no significant obstruction and gives a large atrial branch. RCA:  Mild luminal irregularities without any significant obstruction. It bifurcates into the PDA and the PLB, which are both without any  significant obstruction. The RCA is dominant. LV:  LV systolic pressure is 99% to 60%. The aortic valve is tricuspid. There is no significant aneurysm or dissection in the visualized  portions of aorta. There is no significant regional wall motion  abnormality. The LV systolic pressure is 863. No significant LV to AO  systolic gradient. LVEDP is 27. SUMMARY:  No significant coronary artery disease, preserved LVEF, severe  pulmonary hypertension, elevated wedge pressure/elevated LVEDP. PLAN:  1. Continue with aggressive diuresis. 2.  Will need likely referral to Pulmonary Hypertension Clinic as I do  not believe that the degree of LV diastolic dysfunction and volume  explain the complete elevation in the PA pressures up to almost 90 mmHg. She was ultimately attempted on aggressive diuresis; however, this  resulted in ALVIN and renal failure. Therefore, we will have to be  judicious in terms of the dosing and monitoring of her creatinine. She  would like to be referred to [facility] for further evaluation of her  pulmonary hypertension and right heart failure. 2.  Bedrest.  3.  Overnight observation. 4.  Judicious diuresis. 5.  Routine access site care. 6.  Follow up with myself in two to four weeks postprocedure and then  follow up with [facility] and Pulmonary Hypertension Clinic as soon as  possible. All the above was explained to the patient and the patient's family. They were agreeable and amenable to the above plan.            Rose Mary Rao MD  D: 08/28/2019  Lab Data:    Cardiac Enzymes:  No results for input(s): CKTOTAL, CKMB, CKMBINDEX, TROPONINI in the last 72 hours.     CBC:   Lab Results   Component Value Date/Time    WBC 22.0 07/18/2022 04:40 AM    RBC 3.17 07/18/2022 04:40 AM    RBC 4.15 01/09/2018 08:42 AM    HGB 8.2 07/18/2022 04:40 AM    HCT 30.2 07/18/2022 04:40 AM     07/18/2022 04:40 AM       CMP:    Lab Results   Component Value Date/Time     07/18/2022 04:40 AM    K 3.9 07/18/2022 04:40 AM    K 4.1 03/14/2022 04:44 AM     07/18/2022 04:40 AM    CO2 17 07/18/2022 04:40 AM    BUN 85 07/18/2022 04:40 AM    CREATININE 3.3 07/18/2022 04:40 AM    LABGLOM 14 07/18/2022 04:40 AM    GLUCOSE 66 07/18/2022 04:40 AM    GLUCOSE 103 01/09/2018 08:42 AM    CALCIUM 8.6 07/18/2022 04:40 AM       Hepatic Function Panel:    Lab Results   Component Value Date/Time    ALKPHOS 71 04/16/2022 05:38 AM    ALT 14 04/16/2022 05:38 AM    AST 12 04/16/2022 05:38 AM    PROT 6.0 04/16/2022 05:38 AM    BILITOT <0.2 04/16/2022 05:38 AM    BILIDIR <0.2 04/11/2022 08:30 PM    LABALBU 1.9 04/16/2022 05:38 AM       Magnesium:    Lab Results   Component Value Date/Time    MG 1.6 07/14/2022 09:16 PM       PT/INR:    Lab Results   Component Value Date/Time    INR 1.07 01/21/2022 01:45 AM       HgBA1c:  No results found for: LABA1C    FLP:    Lab Results   Component Value Date/Time    TRIG 66 06/18/2019 04:56 PM    HDL 41 05/14/2021 09:25 AM    LDLCALC 80 05/14/2021 09:25 AM    LABVLDL 44 01/09/2018 08:42 AM       TSH:    Lab Results   Component Value Date/Time    TSH 4.060 07/14/2022 09:16 PM         Assessment:  New AFB w/ RVR-- currently SR w/ PAC's    [id number] at least 1    On po amiodarone       Hypotension -- resolved - on midodrine   Would try and wean off      Sepsis -- improved    Followed by ID     Hx Kidney stone    left percutaneous nephrostomy tube - exchange 7/13/22    ALVIN / CKD stage 4-5 -- creat stable around 3.0    Hx tunneled HD catheter in place     Severe anemia       Chronic diastolic / Right heart failure - stable   Severe PAHTN per cath 2019      Plan:  Need clearance for NOAC or coumadin for AFB   Currently SR w/ po amiodarone - watch QTC - ekg pending   Echo pending   Watch for CHF with PAH Hx   Add BB             Electronically signed by LANE Becker CNP on 7/18/2022 at 9:18 AM

## 2022-07-18 NOTE — CARE COORDINATION
Collaborative Discharge Planning    Val Alcocer  :  1950  MRN:  [id number]    ADMIT DATE:  2022      Discharge Planning Discharge Planning  Meds-to-Beds: Does the patient want to have any new prescriptions delivered to bedside prior to discharge?: No  Patient expects to be discharged to[de-identified] Skilled nursing facility  [facility] Notes /Social Work Whiteboard Notes  /Social Work Whiteboard: 7/15/2022 DOT/JOSE PereyraChanikki Long is from [facility], plans to return at discharge, no precert, COVID swab    Procedure   :  Lt. Nephrostomy tube exchange   FEES planned  Discharge Plan SNF    Discharge Milestones and Delays: Administering IV medications, continues with IV atb. Wound care. FEES planned.          SIGNED:  Beth Bowers RN   2022, 2:44 PM

## 2022-07-18 NOTE — PROGRESS NOTES
[address] [address]  INPATIENT SPEECH THERAPY  [facility] CCU-STEPDOWN 3B  DAILY NOTE    TIME   SLP Individual Minutes  Time In: 9701  Time Out: 8175  Minutes: 16  Timed Code Treatment Minutes: 0 Minutes       Date: 2022  Patient Name: Margarito Glass      CSN: [id number]   : 1950  (73 y.o.)  Gender: female   Referring Physician: Jazmin Engle, LANE - CNP    Diagnosis: Staghorn calculus  Precautions: Fall Risk, Aspiration Risk  Current Diet: NPO - PEG tube  Swallowing Strategies:  NPO with oral care  Date of Last MBS/FEES:  2022 - MBS    Pain:  No pain reported. Subjective:  [place]AM [place] with approval for skilled dysphagia therapy this date. Upon arrival, patient was awake in bed and agreeable to PO trials. Of note, patient unable to position to high-fowlers d/t increasing levels of pain; repositioned to semi high fowlers. Short-Term Goals:  SHORT TERM GOAL #1:  Goal 1: Patient will consume conservative PO trials of ice chips to ensure consistency for pharyngeal swallow trigger to determine readiness for progression to MBS. INTERVENTIONS: ST completed advanced PO trials of thin via straw and puree this date. Patient with refusal to complete PO trials of hard/coarse solid this date despite ST providing options. Patient with adequate labial seal and intraoral pressure for consumption of thin via straw. Patient with demonstration of suspected decreased bolus control and manipulation and suspected delayed swallow initiation d/t presence of audible swallow. Patient with delayed cough following PO trials this date with concerns for airway invasion events. Of course, pharyngeal dysfunction and totality of airway invasion events cannot be formally assessed without presence of instrumental; however, further instrumental evaluation is recommended at this time.     Recommendations for completion of FEES  assessment to further assess swallow functioning and determine safest level of PO intake for diet initiation given hx of thickened liquids and inability to reposition to high fowlers. *post evaluation, patient without respiratory distress upon leaving room; RN Lannie Apgar notified re: clinical findings and recommendations from skilled dysphagia intervention; verbal receptiveness noted    SHORT TERM GOAL #2:  Goal 2: Staff will exhibit return demonstration for completion of comprehensive oral care procedural analysis to maximize oral integrity and to reduce potential bacteria colonization. INTERVENTIONS: Did not address d/t focus on STG #1    SHORT TERM GOAL #3:  Goal 3: Monitor mentation; consider completion of cognitive linguistic evaluation dependent upon baseline status confirmation with familial members. INTERVENTIONS:Did not address d/t focus on STG #1    Long-Term Goals:   No long term goals d/t short ELOS. EDUCATION:  Learner: Patient  Education:  Reviewed results and recommendations of this evaluation, Reviewed signs, symptoms and risks of aspiration, Reviewed ST goals and Plan of Care, Reviewed recommendations for follow-up, Education Related to Potential Risks and Complications Due to Impairment/Illness/Injury, Education Related to Prevention of Recurrence of Impairment/Illness/Injury, and Education Related to [employer] and Wellness  Evaluation of Education: Verbalizes understanding and Family not present    ASSESSMENT/PLAN:  Activity Tolerance:  Patient tolerance of  treatment: fair. Assessment/Plan: Patient progressing toward established goals. Continues to require skilled care of licensed speech pathologist to progress toward achievement of established goals and plan of care. .     Plan for Next Session: FEES   Discharge Recommendations: SNF     ANNABEL Judge, Speech Pathology Intern

## 2022-07-18 NOTE — PROGRESS NOTES
[id number] [id number]  Fiberoptic Endoscopic Evaluation of Swallowing  [facility] CCU-STEPDOWN 3B      SLP Individual Minutes  Time In: 6323  Time Out: 1130  Minutes: 60  Timed Code Treatment Minutes: 0 Minutes       Date: 2022  Patient Name: Stephy Barger       CSN: [id number]   : 1950  (73 y.o.)  Gender: female   Referring Physician: Lucero Huntley MD  Diagnosis: Staghorn calculus  Date of Last MBS/FEES:  FEES completed in 22 - recommending soft & bite size & moderately thick   Diet:  NPO + PEG until FEES     History of Present Illness/Injury: Per chart review; Patient admitted to [facility] with above med dx; please refer to physician H&P for full details. Patient with admission for sepsis with chronic dysphagia; PEG tube maintains for nocturnal feeds for enteral nutrition/hydration measures. Documentation for variable pharyngeal dysphagia with positive hx for silent aspiration. Most recent FEES completed on 22 consistent for mild-moderate pharyngeal dysphagia, dietary recommendations of soft and bite size with moderately thick liquids. ECF indicates dietary advancement to, \"mechanical soft and thin liquids\". ST consulted to complete clinical swallow evaluation to assist with development of dysphagia management POC. has a past medical history of CHF (congestive heart failure) ([address] Depression, Gout attack, Hemodialysis patient ([address] Hypertension, Osteoarthritis, Pulmonary artery hypertension ([address] Renal calculi, and Thrombocytopenia ([address] Reason for Study: Rule out pharyngeal dysfunction  Prior Swallowing Evaluation/Results: Recent dysphagia treatment with NPO + further assessment recommendations. Pain:  pt reported ankle pain, but didn't rate. Pt agreeable to participate.      Current Diet: NPO    Respiratory Status: Nasal Canula and 1 L/min     Behavioral Observation:alert and agitated    Cognition: Exam limited by cognition    FEES PROCEDURE DETAILS:  Procedure performed by: Werner Benson MA., CCC-SLP / [id number]  Feeder/Assistant: Juan Francisco Tee SLP Student   Scope/Serial Number: Chip Tip Scope - Red #3; Serial Number: [id number]; Model Number: [id number]  Topical Anesthetic Used: No  Patient Positioning: Bed    Procedure explained and education provided to patient including purpose, benefits and risks of testing. Understanding and agreement with testing was verbalized. A flexible fiber-optic nasoendoscope was passed transnasally to view the nasopharynx, oropharynx, hypopharynx, larynx through the left nares without difficuty. Patient tolerance of procedure: Moderate discomfort    ANATOMIC/PHYSIOLIGIC ASSESSMENT:  Nasal Cavity [facility]    Velopharyngeal Port Not tested- pt unable to follow commands     Base of Tongue Impaired    Lingual Tonsils WFL    Vocal Fold Margin - Right WFL    Vocal Fold Margin - Left WFL    Abductory/Adductory Movement [facility]    Ventricular Folds WFL    Pharyngeal Contraction for Pitch Glide WFL    Epiglottis   Impaired Slow epiglottic inversion    Valleculae WFL    Secretions - Appearance WFL    Secretions - Location    WFL    Pyriform Sinus   WFL    Glottal Closure   Impaired Incomplete glottic closure    Arytenoids WFL    Posterior Commisure WFL      PATIENT WAS EVALUATED USING: Secretions, Ice Chips, Thin, Mildly Thick, Puree, Soft, Solid, and Mixed Consistency    SWALLOW OBSERVATION: Premature spillage, Incomplete epiglottic inversion, Residue - Vallecular, and Residue - Posterior pharyngeal wall    LARYNGEAL PENETRATION/ASPIRATION: Laryngeal penetration evident before, during, and after swallow and unable to rule out silent airway invasion of thin liquids vs uncleared deep penetration.      PHARYNGEAL PHASE DORIS SCORE (dysphagia outcome and severity score)  4 = Mild-Moderate Dysphagia - One or two consistencies restricted - may exhibit one or more of the following: Residue clears with cue, Aspiration of one consistency with weak or no reflexive cough, Laryngeal penetration to the vocal cords with cough with two consistencies, Laryngeal penetration to the vocal cords without cough on one consistency    PENETRATION-ASPIRATION SCALE (PAS)  Thin Liquids: 5 = Material enters the airway, contacts the vocal folds, and is not ejected from the airway  Mildly Thick Liquids:  2 = Material enters the airway, remains above vocal folds, and is ejected from the airway  Puree:  2 = Material enters the airway, remains above vocal folds, and is ejected from the airway  Soft Solid:  1 = Material does not enter the airway  Hard Solid: 1 = Material does not enter the airway    ESOPHAGEAL PHASE: No significant findings     ATTEMPTED TECHNIQUES:  Small Bolus Size Effective    Straw Effective    Cup Effective    Chin Tuck Not Attempted    Head Turn Not Attempted    Spoon Presentations Effective    Volitional Cough Ineffective    Spontaneous Cough Ineffective         *limited use of strategies d/t poor tolerance of scope and pt requesting to discontinue the study. DIAGNOSTIC IMPRESSIONS:  Patient presented with mild to moderate pharyngeal dysphagia as outlined above. The patient presented with consistent decreased BOT retraction, decreased pharyngeal constriction as evidenced by slow epiglottic inversion, reduced clearance of bolus from scope, mild pharyngeal residue (valleculae, lateral channels, and posterior pharyngeal wall). Unable to fully assess, however, highly suspicious of decreased laryngeal elevation and thyrohyoid approximation, evidenced by laryngeal penetration before the swallow and inconsistently remaining after the swallow. Noted x2 episodes of thin liquids presentations which un cleared and undetected thin liquids remain at the anterior commissure -- unable to clear with cues, unable to rule out silent aspiration.  Improved bolus control with mildly thick liquids and solids, demonstrated by reduced premature spillage, reduced amount of laryngeal penetration and no evidence of white dye remaining in the laryngeal vestibule. Unable to complete increased PO trials and strategies d/t poor pt compliance and complaints of the scope discomfort. Recommended further assessment via MBS to rule out airway invasion during the swallow and efficacy of strategies. *unable to complete tx following d/t EKG arrival.    Diet Recommendations:  minced & moist solids, mildly thick liquids   Strategies:  Full Upright Position, Small Bite/Sip, Pulmonary Monitoring, Medications Crushed with Puree, Alternate Solids and Liquids, Limit Distractions, and Monitor for Fatigue   Rehabilitation Potential: fair    EDUCATION:  Learner: Patient  Education:  Reviewed results and recommendations of this evaluation, Reviewed diet and strategies, Reviewed signs, symptoms and risks of aspiration, Reviewed ST goals and Plan of Care, and Reviewed recommendations for follow-up  Evaluation of Education: Needs further instruction and Family not present    PLAN:  Skilled SLP intervention on acute care 3-5 x per week or until goals met and/or pt plateaus in function. Specific interventions for next session may include: MBS to further assess airway invasion during the swallow and compensatory strategies. Sherryle Moder PATIENT GOAL:    Return to least restrictive diet. SHORT TERM GOALS:  Short-term Goals  Timeframe for Short-term Goals: 2 weeks  Goal 1: Patient will safely consume mildly thick liquids with minced & moist solids with no overt s/s of airway invasion or pulmonary decline to assist in nutrition/hydrational needs  Goal 2: Staff will exhibit return demonstration for completion of comprehensive oral care procedural analysis to maximize oral integrity and to reduce potential bacteria colonization. Goal 3: Patient would benefit from further assessment via MBS as pt is able to remain upright and agreeable.   Goal 4: Monitor mentation; consider completion of cognitive linguistic evaluation dependent upon baseline status confirmation with familial members. LONG TERM GOALS:  No LTM Goals recommended, due to anticipated short ELOS. Werner Salter MA., Supportie / [email or web address]#.[id number]

## 2022-07-18 NOTE — PROGRESS NOTES
Progress Note    Patient:  Violet Kerr    Unit/Bed:3B-32/032-A  YOB: 1950  MRN: [id number]   Acct: [de-identified]   Admit date: 7/11/2022      Principal Problem:    Staghorn calculus  Active Problems:    Urinary tract infection associated with nephrostomy catheter (HCC)    Hyponatremia    CKD (chronic kidney disease), stage IV (HCC)  Resolved Problems:    * No resolved hospital problems.  *    Disposition continued IV antibiotic for now, anticipate transition to oral anticoagulation, followed by nephrology has a patent hemodialysis catheter right anterior chest      Assessment and Plan:  Atrial fibrillation with RVR, rate controlled cardiology assistance appreciated patient on p.o. amiodarone sinus rhythm noted on telemetry by cardiology with PACs, patient required PRBC transfusion 7/12/2022 I suspect from chronic kidney disease and impaired erythropoietin secretion anticipate transitioning the patient to p.o. Eliquis today for embolic prophylaxis from underlying paroxysmal atrial fibrillation   sepsis from suspected catheter associated urinary tract infection, patient appears to have recurrent urinary tract infections and is prone to these and with multidrug resistant organisms, this is approximately day 6 of Zosyn I will follow infectious disease recommendations she has not had any myesha fevers overnight I will continue this for now and perform further chart review she appears to have staghorn calculi and nephrostomy tubes in place she appears to be a poor surgical candidate for urologic directed removal of staghorn calculi  Chronic kidney disease stage III-IV with  Acute kidney injury we will follow nephrology recommendations continue IV isotonic fluids at current rate and continue bicarbonate offset acidosis with chronic kidney disease  Stage IV decubitus ulceration, follow wound care recommendations these are also possible sources of infection for pulm #2  Dysphagia patient appears to have profound dysphagia and is followed closely by speech therapy of able to touch base with him today and will withhold any type of p.o. intake secondary to likely dysphagia underlying and possible aspiration patient is aware of plan of care possible MBS 7/18/2022  Chronic bedbound status, require further chart review but this patient appears to be deconditioned and does not appear to be ambulatory at her baseline  Symptomatic anemia with underlying chronic kidney disease likely secondary to concomitant chronic inflammation from stage IV decubitus ulcerations and chronic kidney disease, patient required PRBC transfusion 7/12/2022  Anasarca likely due to liver dysfunction with April 2022 serum albumin of 1.8 g/dL        Patient Seen, Chart, Consults notes, Labs, Radiology studies reviewed.     Subjective: Day 3 of stay with hospital service    Patient's medical power of  present daily with medical power of  is present, the patient patient prefers all interventions and is more cooperative, hospice has been consulted secondary to patient verbalizing she desires no further medical interventions last week, hospice has seen the patient today and the patient states she desires all full medical inventions and has no desire for hospice at this time    Telemetry shows peak heart rate at 104 bpm appears to be sinus rhythm with PACs, patient is cleared for initiation of oral anticoagulation, there is not appear any acute surgical intervention anticipated at this time, PRBC transfusion 7/12/2022 that I suspect is secondary to gradual anemia from chronic kidney disease and not acute blood loss    Patient currently is on IV Zosyn cultures are currently being followed by infectious disease anticipate possible transition to oral antibiotics    With assistance from dietary will restart feeds on this patient she appears of tolerated nocturnal feeds at a low rate she will be evaluated for possible MBS today    All other ROS negative except noted in HPI    Past, Family, Social History unchanged from admission. Diet:  ADULT TUBE FEEDING; PEG; Renal Formula; Continuous; 10; Yes; 10; Q 4 hours; 30; 100; Q 4 hours  ADULT DIET; Dysphagia - Minced and Moist; Mildly Thick ([place])    Medications:  Scheduled Meds:   [START ON 7/22/2022] amiodarone  200 mg Oral Daily    HYDROcodone 5 mg - acetaminophen  1 tablet Oral Once    amiodarone  200 mg Oral BID    sodium bicarbonate  1,300 mg Oral TID    morphine  1 mg IntraVENous Once    ondansetron  4 mg IntraVENous Once    midodrine  7.5 mg Oral TID WC    [Held by provider] bumetanide  1 mg Oral Daily    famotidine  20 mg PEG Tube Daily    folic acid  1 mg Oral Daily    metoprolol tartrate  25 mg Oral BID    sertraline  25 mg Oral Daily    sodium chloride flush  10 mL IntraCATHeter TID    sodium hypochlorite   Irrigation BID    sodium chloride flush  5-40 mL IntraVENous 2 times per day    heparin (porcine)  5,000 Units SubCUTAneous 3 times per day     Continuous Infusions:   dextrose      sodium chloride 40 mL/hr at 07/18/22 1237    sodium chloride      sodium chloride       PRN Meds:glucose, dextrose bolus **OR** dextrose bolus, glucagon (rDNA), dextrose, morphine, sodium chloride, acetaminophen, docusate sodium, ipratropium-albuterol, melatonin, sodium chloride flush, sodium chloride, ondansetron **OR** ondansetron, polyethylene glycol    Objective:  CBC:   Recent Labs     07/16/22  0425 07/17/22  0359 07/18/22  0440   WBC 22.3* 18.2* 22.0*   HGB 8.2* 7.9* 8.2*    286 343     BMP:    Recent Labs     07/16/22  0425 07/17/22  0359 07/18/22  0440    139 140   K 3.9 3.7 3.9    108 108   CO2 17* 17* 17*   BUN 87* 82* 85*   CREATININE 3.0* 3.0* 3.3*   GLUCOSE 143* 103 66*     Calcium:  Recent Labs     07/18/22  0440   CALCIUM 8.6     Ionized Calcium:No results for input(s): IONCA in the last 72 hours.   Magnesium:No results for input(s): MG in the last 72 hours.  Phosphorus:No results for input(s): PHOS in the last 72 hours. BNP:No results for input(s): BNP in the last 72 hours. Glucose:  Recent Labs     07/18/22  0812 07/18/22  1216   POCGLU 88 97     HgbA1C: No results for input(s): LABA1C in the last 72 hours. INR: No results for input(s): INR in the last 72 hours. Hepatic: No results for input(s): ALKPHOS, ALT, AST, PROT, BILITOT, BILIDIR, LABALBU in the last 72 hours. Amylase and Lipase:  Recent Labs     07/16/22  0425   LACTA 1.1     Lactic Acid:   Recent Labs     07/16/22  0425   LACTA 1.1     Troponin: No results for input(s): CKTOTAL, CKMB, TROPONINT in the last 72 hours. BNP: No results for input(s): BNP in the last 72 hours. Lipids: No results for input(s): CHOL, TRIG, HDL, LDL, LDLCALC in the last 72 hours. ABGs:   Lab Results   Component Value Date/Time    PH 7.32 07/15/2022 04:22 AM    PCO2 34 07/15/2022 04:22 AM    PO2 69 07/15/2022 04:22 AM    HCO3 18 07/15/2022 04:22 AM    O2SAT 92 07/15/2022 04:22 AM           Radiology reports as per the Radiologist  Radiology: CT ABDOMEN PELVIS WO CONTRAST Additional Contrast? None    Result Date: 7/17/2022  PROCEDURE: CT ABDOMEN PELVIS WO CONTRAST CLINICAL INFORMATION: abdominal pain . COMPARISON: CT abdomen pelvis dated 4/12/2022. TECHNIQUE: Axial 5 mm CT images were obtained through the abdomen and pelvis. No contrast was given. Coronal and sagittal reconstructions were created. All CT scans at this facility use dose modulation, iterative reconstruction, and/or weight-based dosing when appropriate to reduce radiation dose to as low as reasonably achievable.  FINDINGS:  There are small bilateral pleural effusions with right-sided effusion increased in size compared to prior exam. There is adjacent atelectasis which has increased in the interval. The heart is prominently enlarged, similar to prior exam. There is a percutaneous gastrostomy tube in place, stable compared to prior exam. The unopacified liver is unremarkable. There are rim calcified stones within the gallbladder, similar to prior exam. Adrenal glands are unremarkable. There is a percutaneous nephrostomy tube on the left which has been repositioned at a more inferior aspect of the left kidney compared to prior exam. There are prominent dystrophic calcifications in the left kidney, similar to prior exam. There is adjacent scarring  in stranding. There is fat stranding adjacent to the proximal left ureter and renal pelvis, similar to prior exam. The right kidney is stable compared to prior exam. There are calcified granulomas in the spleen which is mildly prominent, unchanged compared to prior. The pancreas is within normal limits. No retroperitoneal or mesenteric lymphadenopathy is identified. Redemonstration of a colostomy, stable compared to prior exam. Small bowel appears within normal limits. The bladder is decompressed with Padilla catheter in place. The uterus and adnexa are unremarkable. No free fluid is identified. There is prominent subcutaneous edema in the abdominal wall, pelvic subcutis tissues and visualized upper extremities, increased compared to prior exam. There are stable degenerative changes of the spine. There is again noted to be a prominent sacral decubitus ulcer extending to the bone, similar to prior exam.      1. Small bilateral pleural effusions, increased on the right with increased adjacent atelectasis. 2. Redemonstration of left percutaneous nephrostomy tube which has been adjusted in position in the interval with stable dysmorphic left kidney with dystrophic calcifications. 3. Worsening anasarca. 4. Cholelithiasis. 5. Stable prominent sacral decubitus ulcer. **This report has been created using voice recognition software. It may contain minor errors which are inherent in voice recognition technology. ** Final report electronically signed by Dr. Rosie Gutierrez MD on 7/17/2022 9:48 AM    XR INJ CONTRAST GASTRIC TUBE was then removed over a guidewire and a new nephrostomy tube, as listed above, was advanced over the Glidewire with the tip coiled in the renal pelvis. . This was performed with fluoroscopic guidance. Fluoroscopic images were obtained for documentation. A Repeat nephrostogram was performed, confirming the catheter to be in good position and functioning well. A small amount of antibiotic ointment was applied to the  wound site. The catheter was stabilized to the skin with a 2-0 silk suture and a split gauze dressing. .. 1. Status post successful nephrostomy tube exchange. 2. Patient will be tentatively scheduled for a repeat routine nephrostomy tube exchange in the next 3-4 months. **This report has been created using voice recognition software. It may contain minor errors which are inherent in voice recognition technology. ** Final report electronically signed by Dr. Romy Jean-Baptiste on 7/13/2022 9:06 AM        Physical Exam:  Vitals: /62   Pulse (!) 107   Temp 98.1 °F (36.7 °C) (Oral)   Resp 20   Ht 4' 9\" (1.448 m)   Wt 147 lb 12.8 oz (67 kg)   SpO2 97%   PF (!) 20 L/min   BMI 31.98 kg/m²   24 hour intake/output:  Intake/Output Summary (Last 24 hours) at 7/18/2022 1525  Last data filed at 7/18/2022 1506  Gross per 24 hour   Intake 510 ml   Output 1035 ml   Net -525 ml     Last 3 weights: Wt Readings from Last 3 Encounters:   07/17/22 147 lb 12.8 oz (67 kg)   06/24/22 140 lb (63.5 kg)   05/31/22 140 lb (63.5 kg)       General appearance - alert, awake appears to be in no acute distress  HEENT: Atraumatic normocephalic, no JVD. Trachea midline.   Chest - Bilateral air entry, no wheezes, crackles or rhonchi  Cardiovascular -irregularly irregular  Abdomen -on initial physical survey patient appears to have a PEG tube in place without leakage of feeds, also appears of colostomy bag in place left lower quadrant  neurological - non focal, no neurological deficits noted  Integumentary - Skin color, texture,

## 2022-07-18 NOTE — PROGRESS NOTES
EN/PN NUTRITION SUPPORT    RECOMMENDATIONS/PLAN:   -Diet per SLP recommendations   -Advance tube feed by 10 ml q 4 hours until goal rate of 30 ml/hr is reached to best meet nutritional needs. This regimen meets 90-99% of nutritional needs.   -Free water flushes per MD - 100 ml q 4 hours   -Monitor tube feeding site, GI tolerance, nutrition related lab values, wounds, PO intake and adjust nutrition interventions appropriately. CURRENT NUTRITION THERAPIES  Diet NPO  ADULT TUBE FEEDING; PEG; Renal Formula; Continuous; 10; Yes; 10; Q 4 hours; 30; 100; Q 4 hours  Current Tube Feeding (TF) Orders:  Feeding Route: PEG  Formula: Renal Formula  Schedule: Continuous   Feeding Regimen: Initiate nepro @ 10 ml/hr, advance by 10 ml q 4 hours or as tolerated until goal rate of 30 ml/hr is reached. Additives/Modulars: None  Water Flushes: Per MD recommendations: 100 ml q 4 hours   Current TF & Flush Orders Provides: ECF regimen - Nepro carb steady at 55 ml/hr from 6p-6a w/ 30 ml free water flush at start and stop of TF - provides 1168 kcals, 53 gm protein, 106 gm CHO, 16.5 gm fiber, 540 ml free water (480 TF, 60 flushes) in 720 ml volume (660 TF, 60 flushes)/24 hours  Goal TF & Flush Orders Provides: Nepro @ 30 ml/hr provides: 1274 kcals, 115 g CHO, 58 g protein, 18 g fiber, 756 mg sodium, 683 mg potassium, 516 mg phosphorus, 523 ml free water in total volume 720 ml/24 hours. COMPARATIVE STANDARDS  Energy (kcal):  8471-1592 kcals (20-22); Weight Used for Energy Requirements:  Admission (64kgm on 7/11)     Protein (g):  50-63 grams (1.2-1.5) pending renal HD/ wound status; Weight Used for Protein Requirements:  Ideal (42kgm)        Fluid (ml/day):  per Nephrology; Method Used for Fluid Requirements:  Other (Comment)      NUTRITIONAL SUMMARY/STATUS:   Pt. with minimal improvement from a nutritional standpoint AEB diet NPO and TF held temporarily d/t leakage at peg site.  At risk for further nutrition compromise r/t admit for preop antibiotics prior to nephrostomy tube exchange, hx obstructive staghorn calculus of left kidney, nephrostomy tube placement- 7/13/22, patient with purulent drainage from left nephrostomy tube, hypotension, ALVIN on CKD stage III, hemodialysis patient, dysphagia, increased nutrient needs for wound healing, chronically bed bound, underlying medical condition (CHF, 1/27/22 diverting colostomy, depression, gout, OA). NUTRITION RELATED FINDINGS:   Treatments: Pt was made NPO d/t possible aspiration on 7/16- speech completed FEES 7/18 and reports will advance diet to mildly thick- unsure how much pt will eat but upon assessment pt reports she is hungry. Discussed continuous rate at this time and will re-evaluate tomorrow if best to switch back to nocturnal feeds pending PO intake, pt agreeable. Hospice was consulted but then pt declined and wishes to remain full code. 7/17 pt was refusing turns every two hours but today per nursing staff doing better with turns. Per nursing pt may go to inpatient dialysis today. Has not had dialysis for the past several days. TF Status: per EMR tube feed was held 7/16 secondary to noted leakage around peg tube; restarted 7/18 around 10:30 AM per MD- will monitor tube feeding site- okay to slowly advance rate to goal per MD  GI Status: pt denies nausea or abdominal pain; last BM 7/17  Pertinent Labs: Na 140, K 3.9, BUN 85, creatinine 3.3, GFR 14, glucose 66  Pertinent Meds: bumex, pepcid, folvite, midodrine   Current Weight: 147 lb 12.8 oz (67 kg) (7/17: nonpitting edema)  Admission Weight:  141 lb 3.2 oz (64 kg) (7/11/22; no edema)  Wounds:  (unstageable wounds to right and left posterior lower legs, right posterior heel, and left second toe; evolving DTPI to right hip and ischium; stage 4 sacrum)  Malnutrition Status: At risk for malnutrition (Comment)    Please refer to initial nutrition assessment for additional details.    Donn Hernandez RD:    Contact Number: [phone]

## 2022-07-18 NOTE — PROGRESS NOTES
Visit made to assess for need of hospice referral. No noted documentation that patient had asked for referral and palliative noted on 07.15.2022 indicated that patient wished to remain full code. Reason for consult listed as, Goals of care with family and patient. Discussed with primary nurse Jorge Purcell, patient had voiced on Saturday to primary nurse, that she was done. Noted that she remains FULL code. Patient lying in bed awake. Introduced self and told her that understanding is \"that she may be receptive for information about hospice\". Told nurse \"No\". Let her know that if she would change her mind at anytime, hospice care give information meeting. Updated Kyler Almanzar RN, that patient not wishing for hospice referral at this time.

## 2022-07-19 ENCOUNTER — APPOINTMENT (OUTPATIENT)
Dept: GENERAL RADIOLOGY | Age: 72
DRG: 698 | End: 2022-07-19
Attending: UROLOGY
Payer: MEDICARE

## 2022-07-19 LAB
ANION GAP SERPL CALCULATED.3IONS-SCNC: 11 MEQ/L (ref 8–16)
BUN BLDV-MCNC: 82 MG/DL (ref 7–22)
CALCIUM SERPL-MCNC: 8.3 MG/DL (ref 8.5–10.5)
CHLORIDE BLD-SCNC: 108 MEQ/L (ref 98–111)
CO2: 19 MEQ/L (ref 23–33)
CREAT SERPL-MCNC: 3.2 MG/DL (ref 0.4–1.2)
ERYTHROCYTE [DISTWIDTH] IN BLOOD BY AUTOMATED COUNT: 18 % (ref 11.5–14.5)
ERYTHROCYTE [DISTWIDTH] IN BLOOD BY AUTOMATED COUNT: 63.1 FL (ref 35–45)
GFR SERPL CREATININE-BSD FRML MDRD: 14 ML/MIN/1.73M2
GLUCOSE BLD-MCNC: 106 MG/DL (ref 70–108)
GLUCOSE BLD-MCNC: 114 MG/DL (ref 70–108)
GLUCOSE BLD-MCNC: 118 MG/DL (ref 70–108)
GLUCOSE BLD-MCNC: 123 MG/DL (ref 70–108)
GLUCOSE BLD-MCNC: 124 MG/DL (ref 70–108)
HCT VFR BLD CALC: 31.3 % (ref 37–47)
HEMOGLOBIN: 8.4 GM/DL (ref 12–16)
MCH RBC QN AUTO: 25.5 PG (ref 26–33)
MCHC RBC AUTO-ENTMCNC: 26.8 GM/DL (ref 32.2–35.5)
MCV RBC AUTO: 94.8 FL (ref 81–99)
PLATELET # BLD: 371 THOU/MM3 (ref 130–400)
PMV BLD AUTO: 9 FL (ref 9.4–12.4)
POTASSIUM SERPL-SCNC: 3.6 MEQ/L (ref 3.5–5.2)
RBC # BLD: 3.3 MILL/MM3 (ref 4.2–5.4)
SCAN OF BLOOD SMEAR: NORMAL
SODIUM BLD-SCNC: 138 MEQ/L (ref 135–145)
WBC # BLD: 17.7 THOU/MM3 (ref 4.8–10.8)

## 2022-07-19 PROCEDURE — 6370000000 HC RX 637 (ALT 250 FOR IP): Performed by: NURSE PRACTITIONER

## 2022-07-19 PROCEDURE — 36415 COLL VENOUS BLD VENIPUNCTURE: CPT

## 2022-07-19 PROCEDURE — 99232 SBSQ HOSP IP/OBS MODERATE 35: CPT | Performed by: INTERNAL MEDICINE

## 2022-07-19 PROCEDURE — 6370000000 HC RX 637 (ALT 250 FOR IP)

## 2022-07-19 PROCEDURE — 2500000003 HC RX 250 WO HCPCS: Performed by: INTERNAL MEDICINE

## 2022-07-19 PROCEDURE — 1200000003 HC TELEMETRY R&B

## 2022-07-19 PROCEDURE — 80048 BASIC METABOLIC PNL TOTAL CA: CPT

## 2022-07-19 PROCEDURE — 6370000000 HC RX 637 (ALT 250 FOR IP): Performed by: INTERNAL MEDICINE

## 2022-07-19 PROCEDURE — 99232 SBSQ HOSP IP/OBS MODERATE 35: CPT | Performed by: STUDENT IN AN ORGANIZED HEALTH CARE EDUCATION/TRAINING PROGRAM

## 2022-07-19 PROCEDURE — 6360000002 HC RX W HCPCS: Performed by: INTERNAL MEDICINE

## 2022-07-19 PROCEDURE — 6370000000 HC RX 637 (ALT 250 FOR IP): Performed by: UROLOGY

## 2022-07-19 PROCEDURE — 2580000003 HC RX 258: Performed by: UROLOGY

## 2022-07-19 PROCEDURE — 74230 X-RAY XM SWLNG FUNCJ C+: CPT

## 2022-07-19 PROCEDURE — 6360000002 HC RX W HCPCS: Performed by: UROLOGY

## 2022-07-19 PROCEDURE — 82948 REAGENT STRIP/BLOOD GLUCOSE: CPT

## 2022-07-19 PROCEDURE — 92526 ORAL FUNCTION THERAPY: CPT

## 2022-07-19 PROCEDURE — 92611 MOTION FLUOROSCOPY/SWALLOW: CPT

## 2022-07-19 PROCEDURE — 2580000003 HC RX 258: Performed by: INTERNAL MEDICINE

## 2022-07-19 PROCEDURE — 85027 COMPLETE CBC AUTOMATED: CPT

## 2022-07-19 RX ADMIN — METOPROLOL TARTRATE 25 MG: 25 TABLET, FILM COATED ORAL at 20:49

## 2022-07-19 RX ADMIN — HEPARIN SODIUM 5000 UNITS: 5000 INJECTION INTRAVENOUS; SUBCUTANEOUS at 20:50

## 2022-07-19 RX ADMIN — MORPHINE SULFATE 1 MG: 2 INJECTION, SOLUTION INTRAMUSCULAR; INTRAVENOUS at 04:54

## 2022-07-19 RX ADMIN — MIDODRINE HYDROCHLORIDE 7.5 MG: 2.5 TABLET ORAL at 09:17

## 2022-07-19 RX ADMIN — HEPARIN SODIUM 5000 UNITS: 5000 INJECTION INTRAVENOUS; SUBCUTANEOUS at 14:08

## 2022-07-19 RX ADMIN — SERTRALINE 25 MG: 50 TABLET, FILM COATED ORAL at 09:16

## 2022-07-19 RX ADMIN — AMIODARONE HYDROCHLORIDE 200 MG: 200 TABLET ORAL at 09:17

## 2022-07-19 RX ADMIN — SODIUM CHLORIDE, PRESERVATIVE FREE 10 ML: 5 INJECTION INTRAVENOUS at 14:08

## 2022-07-19 RX ADMIN — Medication 6 MG: at 01:40

## 2022-07-19 RX ADMIN — MORPHINE SULFATE 1 MG: 2 INJECTION, SOLUTION INTRAMUSCULAR; INTRAVENOUS at 21:59

## 2022-07-19 RX ADMIN — SODIUM CHLORIDE: 9 INJECTION, SOLUTION INTRAVENOUS at 13:22

## 2022-07-19 RX ADMIN — SODIUM CHLORIDE, PRESERVATIVE FREE 10 ML: 5 INJECTION INTRAVENOUS at 20:50

## 2022-07-19 RX ADMIN — DAKIN'S SOLUTION 0.125% (QUARTER STRENGTH): 0.12 SOLUTION at 09:17

## 2022-07-19 RX ADMIN — BARIUM SULFATE 10 ML: 0.81 POWDER, FOR SUSPENSION ORAL at 10:09

## 2022-07-19 RX ADMIN — MIDODRINE HYDROCHLORIDE 7.5 MG: 2.5 TABLET ORAL at 16:57

## 2022-07-19 RX ADMIN — BARIUM SULFATE 5 ML: 400 PASTE ORAL at 10:09

## 2022-07-19 RX ADMIN — SODIUM BICARBONATE 1300 MG: 650 TABLET ORAL at 20:50

## 2022-07-19 RX ADMIN — DAKIN'S SOLUTION 0.125% (QUARTER STRENGTH): 0.12 SOLUTION at 22:04

## 2022-07-19 RX ADMIN — SODIUM CHLORIDE, PRESERVATIVE FREE 10 ML: 5 INJECTION INTRAVENOUS at 20:51

## 2022-07-19 RX ADMIN — HEPARIN SODIUM 5000 UNITS: 5000 INJECTION INTRAVENOUS; SUBCUTANEOUS at 04:49

## 2022-07-19 RX ADMIN — AMIODARONE HYDROCHLORIDE 200 MG: 200 TABLET ORAL at 20:50

## 2022-07-19 RX ADMIN — FOLIC ACID 1 MG: 1 TABLET ORAL at 09:17

## 2022-07-19 RX ADMIN — SODIUM BICARBONATE 1300 MG: 650 TABLET ORAL at 09:17

## 2022-07-19 RX ADMIN — METOPROLOL TARTRATE 25 MG: 25 TABLET, FILM COATED ORAL at 09:17

## 2022-07-19 RX ADMIN — FAMOTIDINE 20 MG: 20 TABLET ORAL at 09:17

## 2022-07-19 RX ADMIN — SODIUM BICARBONATE 1300 MG: 650 TABLET ORAL at 14:08

## 2022-07-19 ASSESSMENT — PAIN SCALES - GENERAL
PAINLEVEL_OUTOF10: 0
PAINLEVEL_OUTOF10: 0

## 2022-07-19 ASSESSMENT — PAIN DESCRIPTION - DESCRIPTORS: DESCRIPTORS: ACHING

## 2022-07-19 ASSESSMENT — PAIN SCALES - WONG BAKER: WONGBAKER_NUMERICALRESPONSE: 8

## 2022-07-19 ASSESSMENT — PAIN DESCRIPTION - LOCATION: LOCATION: GENERALIZED

## 2022-07-19 NOTE — PLAN OF CARE
pain so far this shift. Reminded patient to report any pain, pressure, or shortness of breath to the nurse. Will continue to monitor. Care plan reviewed with patient. Patient verbalizes understanding of the care plan and contributed to goal setting.

## 2022-07-19 NOTE — PROGRESS NOTES
Renal Progress Note  [facility]    Patient :  Suze Gomez; 73 y.o. MRN# [id number]  Location:  3B-32/032-A  Attending:  Dwight Bustillo MD  Admit Date:  7/11/2022   Hospital Day: 4      Subjective:     Nephrology is following the patient for ALVIN on CKD. Patient was seen and examined. Patient is doing well and is resting. She denies any shortness of breath or chest pain. She is tolerating her fluids well and is urinating well. Per RN, she was in pain last night after her dressing change and received morphine. Outpatient Medications:     Medications Prior to Admission: buPROPion (WELLBUTRIN) 75 MG tablet, Take 75 mg by mouth daily  traMADol (ULTRAM) 50 MG tablet, Take 50 mg by mouth 2 times daily as needed for Pain (moderate to severe pain). sertraline (ZOLOFT) 50 MG tablet, Take 25 mg by mouth daily   bumetanide (BUMEX) 1 MG tablet, Take 2 mg by mouth daily   metoprolol tartrate (LOPRESSOR) 25 MG tablet, Take 1 tablet by mouth 2 times daily  ipratropium-albuterol (DUONEB) 0.5-2.5 (3) MG/3ML SOLN nebulizer solution, Inhale 3 mLs into the lungs every 4 hours as needed for Shortness of Breath  sodium chloride 0.9 % SOLN, Inject as directed Flush TID nephrostomy tube 10ml. miconazole (MICOTIN) 2 % powder, Apply topically daily as needed for Itching Apply topically 2 times daily.   silver nitrate applicators 21-38 % applicator, Apply 1 each topically Once a week on Friday and PRN to prowed flesh at peg site  folic acid (FOLVITE) 1 MG tablet, Take 1 tablet by mouth daily  melatonin 3 MG TABS tablet, Take 2 tablets by mouth nightly as needed (anxiety, sleep)  [DISCONTINUED] ferrous sulfate (IRON 325) 325 (65 Fe) MG tablet, Take 1 tablet by mouth every other day (Patient not taking: Reported on 7/11/2022)  famotidine (PEPCID) 20 MG tablet, 20 mg by PEG Tube route daily  senna (SENOKOT) 8.8 MG/5ML SYRP syrup, Take 5 mLs by mouth nightly as needed (Patient not taking: Reported on °C)  TMax:   Temp (24hrs), Av.1 °F (36.7 °C), Min:97.6 °F (36.4 °C), Max:98.7 °F (37.1 °C)    Respirations:  Resp: 18  Pulse:   Heart Rate: 76  BP:    BP: (!) 120/58  BP Range: Systolic (23EWM), QXZ:474 , Min:109 , KPM:338       Diastolic (82EQC), FGB:39, Min:53, Max:62      Physical Examination:     General:  Awake, alert, no acute distress  HEENT: NC/AT/ MMM  Chest:              Clear B/L no W/R/R  Cardiac:  S1 S2 appreciated  Abdomen: Soft, non-tender,  Neuro:              No facial droop, No Asterixis  SKIN:  No rashes, good skin turgor. Extremities:  No edema, no cyanosis    Labs:       Recent Labs     22  0359 22  0440 22  0328   WBC 18.2* 22.0* 17.7*   RBC 3.02* 3.17* 3.30*   HGB 7.9* 8.2* 8.4*   HCT 28.9* 30.2* 31.3*   MCV 95.7 95.3 94.8   MCH 26.2 25.9* 25.5*   MCHC 27.3* 27.2* 26.8*    343 371   MPV 8.8* 8.5* 9.0*      BMP:   Recent Labs     22  0359 22  0440 22  0910    140 138   K 3.7 3.9 3.6    108 108   CO2 17* 17* 19*   BUN 82* 85* 82*   CREATININE 3.0* 3.3* 3.2*   GLUCOSE 103 66* 106   CALCIUM 8.1* 8.6 8.3*      Phosphorus:   No results for input(s): PHOS in the last 72 hours. Magnesium:  No results for input(s): MG in the last 72 hours. Albumin:  No results for input(s): LABALBU in the last 72 hours.   BNP:      Lab Results   Component Value Date/Time    BNP 23 10/04/2017 10:37 AM     RYLAN:    No results found for: RYLAN  SPEP:  Lab Results   Component Value Date/Time    PROT 6.0 2022 05:38 AM     UPEP:   No results found for: LABPE  C3:     Lab Results   Component Value Date/Time    C3 93 10/25/2021 11:37 AM     C4:     Lab Results   Component Value Date/Time    C4 22 10/25/2021 11:37 AM     MPO ANCA:   No results found for: MPO  PR3 ANCA:   No results found for: PR3  Anti-GBM:   No results found for: GBMABIGG  Hep BsAg:         Lab Results   Component Value Date/Time    HEPBSAG Negative 2022 03:26 PM     Hep C AB:          Lab Results   Component Value Date/Time    HEPCAB Negative 04/21/2022 03:26 PM       Urinalysis/Chemistries:      Lab Results   Component Value Date/Time    NITRU NEGATIVE 07/15/2022 07:00 AM    COLORU YELLOW 07/15/2022 07:00 AM    PHUR 5.5 07/15/2022 07:00 AM    LABCAST 4-8 HYALINE 07/15/2022 07:00 AM    LABCAST NONE SEEN 07/15/2022 07:00 AM    WBCUA 25-50 07/15/2022 07:00 AM    RBCUA > 200 07/15/2022 07:00 AM    MUCUS OBSCURED 01/19/2022 09:15 AM    MUCUS NONE SEEN 01/19/2022 09:15 AM    YEAST NONE SEEN 07/15/2022 07:00 AM    BACTERIA NONE SEEN 07/15/2022 07:00 AM    SPECGRAV 1.014 07/15/2022 07:00 AM    LEUKOCYTESUR SMALL 07/15/2022 07:00 AM    LEUKOCYTESUR MODERATE 04/12/2022 06:30 AM    UROBILINOGEN 0.2 07/15/2022 07:00 AM    BILIRUBINUR NEGATIVE 07/15/2022 07:00 AM    BLOODU LARGE 07/15/2022 07:00 AM    GLUCOSEU NEGATIVE 04/12/2022 06:30 AM    KETUA NEGATIVE 07/15/2022 07:00 AM    AMORPHOUS URATES 04/12/2022 06:30 AM     Urine Sodium:   No results found for: MARION  Urine Potassium:  No results found for: KUR  Urine Chloride:  No results found for: CLUR  Urine Osmolarity:   Lab Results   Component Value Date/Time    OSMOU 420 01/31/2022 10:50 AM     Urine Protein:   No components found for: TOTALPROTEIN, URINE   Urine Creatinine:     Lab Results   Component Value Date/Time    LABCREA 81.5 10/25/2021 08:00 AM     Urine Eosinophils:  No components found for: UEOS        Impression and Plan:  1. ALVIN on CKD -- clinically patient is looking well. Cr is 3.3 and BUN is 85, slightly increased from yesterday. Likely will need to continue dialysis on her. Will repeat labs in AM to monitor. 2. Metabolic acidosis -- CO2 at 17 x 3 days, appears to be stable. 3. Electrolytes -- stable, will continue to monitor. Please don't hesitate to call with any questions.   Electronically signed by Cindy Domingo DO on 7/19/2022 at 11:44 AM

## 2022-07-19 NOTE — PROGRESS NOTES
Coccyx wound dressing changed. Right heel and right calf wound dressing changed. Betadine to right toe. Right foam dressings CDI - did not change. Foam dressing on left toe CDI - did not change.

## 2022-07-19 NOTE — PROGRESS NOTES
day    heparin (porcine)  5,000 Units SubCUTAneous 3 times per day      dextrose      sodium chloride      sodium chloride       glucose, dextrose bolus **OR** dextrose bolus, glucagon (rDNA), dextrose, morphine, sodium chloride, acetaminophen, docusate sodium, ipratropium-albuterol, melatonin, sodium chloride flush, sodium chloride, ondansetron **OR** ondansetron, polyethylene glycol      LABS:     CBC:   Recent Labs     07/17/22  0359 07/18/22  0440 07/19/22  0328   WBC 18.2* 22.0* 17.7*   HGB 7.9* 8.2* 8.4*    343 371     BMP:    Recent Labs     07/17/22  0359 07/18/22  0440 07/19/22  0910    140 138   K 3.7 3.9 3.6    108 108   CO2 17* 17* 19*   BUN 82* 85* 82*   CREATININE 3.0* 3.3* 3.2*   GLUCOSE 103 66* 106     Calcium:  Recent Labs     07/19/22  0910   CALCIUM 8.3*      Recent Labs     07/18/22  1956 07/19/22  0758 07/19/22  1209   POCGLU 88 118* 124*      CULTURES:   UA: No results for input(s): Karene Pat, COLORU, CLARITYU, MUCUS, PROTEINU, BLOODU, RBCUA, WBCUA, BACTERIA, NITRU, GLUCOSEU, BILIRUBINUR, UROBILINOGEN, KETUA, LABCAST, LABCASTTY, AMORPHOS in the last 72 hours. Invalid input(s): CRYSTALS  Micro:   Lab Results   Component Value Date/Time    BC No growth-preliminary No growth  07/11/2022 02:23 PM    BC No growth-preliminary No growth  07/11/2022 02:23 PM          Problem list of patient:     Patient Active Problem List   Diagnosis Code    Essential hypertension I10    Chronic depression F32. A    CHF (congestive heart failure) (HCC) I50.9    Thrombocytopenia (HCC) D69.6    Moderate episode of recurrent major depressive disorder (HCC) F33.1    Uremia N19    Hyperkalemia E87.5    Isolated non-nephrotic proteinuria R80.0    ALVIN (acute kidney injury) ([facility] [address] N17.9    Chronic right-sided heart failure (HCC) I50.812    Pulmonary HTN (HCC) I27.20    Hypotension due to hypovolemia I95.89, E86.1    Acute renal failure superimposed on stage 4 chronic kidney disease (HCC) N17.9, N18.4 Pressure injury of sacral region, stage 4 ([facility]) L89.154    Acute respiratory failure ([facility]) J96.00    Flash pulmonary edema ([facility]) J81.0    Shock ([facility]) R57.9    Aspiration pneumonia of left lung ([facility]) J69.0    Pleural effusion J90    Paroxysmal atrial fibrillation ([facility]) I48.0    Hemoptysis R04.2    Chronic respiratory failure with hypoxia ([facility]) J96.11    Pneumothorax, right J93.9    Collapse of left lung J98.11    Abnormal chest x-ray R93.89    Acute on chronic respiratory failure with hypoxia ([facility]) J96.21    Retroperitoneal hematoma K66.1    HAP (hospital-acquired pneumonia) J18.9, Y95    Colostomy in place ([facility] [address] Z93.3    PEG (percutaneous endoscopic gastrostomy) status ([facility]) Z93.1    Intertriginous dermatitis associated with moisture L30.4    Peristomal dermatitis associated with moisture L30.8    Nephrostomy tube displaced ([address] T83.022A    Open wound of second toe of right foot S91.104A    Open wound of second toe of left foot J99.908I    Metabolic acidosis B01.0    Irritation around percutaneous endoscopic gastrostomy (PEG) tube site [facility]) K94.29    Kidney stone N20.0    Urinary tract infection associated with nephrostomy catheter ([facility]) T83.512A, N39.0    Hyponatremia E87.1    CKD (chronic kidney disease), stage IV ([facility]) N18.4         ASSESSMENT/PLAN   Sepsis  Multiple decubitus wound on the extremites   Stage 4 CKD  UTI  Leak and irritation around the peg tube.   Atrial fibrillation  Deconditioning  Continue current therapy  Long term prognosis is guarded        Noemy Gutierrez MD, MD, FACP 7/19/2022 4:04 PM

## 2022-07-19 NOTE — PROGRESS NOTES
Cardiology Progress Note      Patient:  Atilano Bumpers  YOB: 1950  MRN: [id number]   Acct: [de-identified]  Admit Date:  7/11/2022  Primary Cardiologist: Payam Rizo MD  Per Dr Shani Ramírez note:  \"Per prior cardiology consult note-  REASON FOR THE CONSULTATION:  Atrial fibrillation. REQUESTING PROVIDER:  Hospitalist Service. HISTORY OF PRESENT ILLNESS:  This is a pleasant 71-year-old patient who  is not necessarily the best historian who seemed to be somehow drowsy  and was not able to give much history. She apparently came in with some  kidney stone and worsening renal failure. She does have underlying  cardiorenal disease and she [facility] to  start with for diastolic dysfunction. She apparently has had previous  catheterization with normal coronary arteries and some element of  diastolic dysfunction with renal insufficiency. Comes in with worsening  renal failure, kidney stone and has had some intermittent atrial  fibrillation on the EKG. She is not aware of any previous atrial  fibrillation or I could find any evidence in the chart. We are  consulted to assist in the management of the patient. \"    Subjective (Events in last 24 hours):   Pt awake, alert. Nad. Ketan cp, states breathign is okay. D/w patient and nurse about medications, will keep midodrine for now to help give metoprolol. Consider weaning if tolerated.      Objective:   BP (!) 110/57   Pulse 94   Temp 97.6 °F (36.4 °C) (Oral)   Resp 18   Ht 4' 9\" (1.448 m)   Wt 150 lb (68 kg)   SpO2 96%   PF (!) 20 L/min   BMI 32.46 kg/m²      vss  TELEMETRY: nsr, ectopy, mostly PAC    Physical Exam:  General Appearance: alert and oriented to person, place and time, in no acute distress  Cardiovascular: normal rate, regular rhythm, normal S1 and S2, no murmurs, rubs, clicks, or gallops, distal pulses intact, no carotid bruits, no JVD  Pulmonary/Chest: clear to auscultation bilaterally anteriorly, patient declined help to turn or sit up so exam of posterior chest was deferred- no wheezes, rales or rhonchi, normal air movement, no respiratory distress  Abdomen: soft, non-tender, non-distended, normal bowel sounds, no masses   Extremities: no cyanosis, clubbing or edema, pulse, sores on multiple toes, wraps on legs   Skin: warm and dry, no rash or erythema  Neurological: alert, oriented, normal speech, no focal findings or movement disorder noted    Medications:    [START ON 7/22/2022] amiodarone  200 mg Oral Daily    amiodarone  200 mg Oral BID    sodium bicarbonate  1,300 mg Oral TID    morphine  1 mg IntraVENous Once    ondansetron  4 mg IntraVENous Once    midodrine  7.5 mg Oral TID WC    [Held by provider] bumetanide  1 mg Oral Daily    famotidine  20 mg PEG Tube Daily    folic acid  1 mg Oral Daily    metoprolol tartrate  25 mg Oral BID    sertraline  25 mg Oral Daily    sodium chloride flush  10 mL IntraCATHeter TID    sodium hypochlorite   Irrigation BID    sodium chloride flush  5-40 mL IntraVENous 2 times per day    heparin (porcine)  5,000 Units SubCUTAneous 3 times per day      dextrose      sodium chloride 40 mL/hr at 07/18/22 1237    sodium chloride      sodium chloride       glucose, 4 tablet, PRN  dextrose bolus, 125 mL, PRN   Or  dextrose bolus, 250 mL, PRN  glucagon (rDNA), 1 mg, PRN  dextrose, 100 mL/hr, PRN  morphine, 1 mg, PRN  sodium chloride, , PRN  acetaminophen, 650 mg, Q8H PRN  docusate sodium, 100 mg, Daily PRN  ipratropium-albuterol, 3 mL, Q4H PRN  melatonin, 6 mg, Nightly PRN  sodium chloride flush, 5-40 mL, PRN  sodium chloride, , PRN  ondansetron, 4 mg, Q8H PRN   Or  ondansetron, 4 mg, Q6H PRN  polyethylene glycol, 17 g, Daily PRN        Diagnostics:  EKG:     Echo:     Stress:     Left Heart Cath:     Lab Data:    Cardiac Enzymes:  No results for input(s): CKTOTAL, CKMB, CKMBINDEX, TROPONINI in the last 72 hours.     CBC:   Lab Results   Component Value Date/Time    WBC 17.7 07/19/2022 today         Chronic diastolic / Right heart failure - stable  Severe PAHTN per cath 2019  Mild AS    Plan:  Need NOAC or coumadin for afib after clearance for anemia. Mostly NSR on tele, occ afib appearing rhythm   Echo normal EF, mild AS  Qtc yesterday 446, was previously 532    If HR remains controlled, cardiology will see PRN. Consider uptitrating BB if needing further rate control but mostly controlled at present with PACs. F/u with Dr Roberta Steven office in 2-4 weeks.      Electronically signed by Maria Isabel Bailey PA-C on 7/19/2022 at 9:06 AM

## 2022-07-19 NOTE — PLAN OF CARE
Problem: Skin/Tissue Integrity  Goal: Absence of new skin breakdown  Description: 1. Monitor for areas of redness and/or skin breakdown  2. Assess vascular access sites hourly  3. Every 4-6 hours minimum:  Change oxygen saturation probe site  4. Every 4-6 hours:  If on nasal continuous positive airway pressure, respiratory therapy assess nares and determine need for appliance change or resting period. 7/19/2022 1713 by Zay Sousa RN  Outcome: Progressing  Note: Patient with known skin breakdown on coccyx, right hip, bilateral toes, bilateral heels, back of right calf. No new skin breakdown noted. Will continue monitor. Problem: Skin/Tissue Integrity  Goal: Absence of new skin breakdown  Description: 1. Monitor for areas of redness and/or skin breakdown  2. Assess vascular access sites hourly  3. Every 4-6 hours minimum:  Change oxygen saturation probe site  4. Every 4-6 hours:  If on nasal continuous positive airway pressure, respiratory therapy assess nares and determine need for appliance change or resting period. 7/19/2022 1713 by Zay Sousa RN  Outcome: Progressing  Note: Patient with known skin breakdown on coccyx, right hip, bilateral toes, bilateral heels, back of right calf. No new skin breakdown noted. Will continue monitor.

## 2022-07-19 NOTE — PROGRESS NOTES
Progress Note    Patient:  Stefany Real    Unit/Bed:3B-32/032-A  YOB: 1950  MRN: [id number]   Acct: [de-identified]   Admit date: 7/11/2022      Principal Problem:    Staghorn calculus  Active Problems:    Urinary tract infection associated with nephrostomy catheter (HCC)    Hyponatremia    CKD (chronic kidney disease), stage IV (HCC)  Resolved Problems:    * No resolved hospital problems. *    Disposition patient is hemodynamically stable followed by nephrology require hemodialysis today no erythema at insertion site of hemodialysis catheter, off Zosyn, with bedbound status hypoalbuminemia and intermittent hemodialysis to patient's prognosis overall is poor since she is not mobile.       Assessment and Plan:  Atrial fibrillation with RVR, rate controlled cardiology assistance appreciated patient on p.o. amiodarone sinus rhythm noted on telemetry by cardiology with PACs, patient required PRBC transfusion 7/12/2022 I suspect from chronic kidney disease and impaired erythropoietin secretion anticipate transitioning the patient to p.o. Eliquis next several days for embolic prophylaxis from underlying paroxysmal atrial fibrillation  sepsis from suspected catheter associated urinary tract infection, patient appears to have recurrent urinary tract infections and is prone to these and with multidrug resistant organisms, this is approximately day 6 of Zosyn that has been given, off of Zosyn at this time I will follow infectious disease recommendations she has not had any myesha fevers overnight I will continue this for now and perform further chart review she appears to have staghorn calculi and nephrostomy tubes in place she appears to be a poor surgical candidate for urologic directed removal of staghorn calculi, sepsis seems to have resolved  Chronic kidney disease stage III-IV with  Acute kidney injury we will follow nephrology recommendations continue IV isotonic fluids at current rate and continue bicarbonate offset acidosis with chronic kidney disease, will undergo hemodialysis today  Stage IV decubitus ulceration, follow wound care recommendations these are also possible sources of infection for pulm #2  Dysphagia patient appears to have profound dysphagia and is followed closely by speech therapy of able to touch base with him today and will withhold any type of p.o. intake secondary to likely dysphagia underlying and possible aspiration patient is aware of plan of care possible [facility] [place]  Chronic bedbound status, require further chart review but this patient appears to be deconditioned and does not appear to be ambulatory at her baseline  Symptomatic anemia with underlying chronic kidney disease likely secondary to concomitant chronic inflammation from stage IV decubitus ulcerations and chronic kidney disease, patient required PRBC transfusion 7/12/2022, hemoglobin stable do not suspect acute blood loss suspect gradual decrease and blood counts related to chronic kidney disease  Anasarca likely due to liver dysfunction with April 2022 serum albumin of 1.8 g/dL        Patient Seen, Chart, Consults notes, Labs, Radiology studies reviewed. Subjective: Day 4 of stay with hospital stay    No acute decompensation overnight patient appears been hemodynamically stable on telemetry she appears to have had essentially sinus rhythm with PACs rates peak at 110 but for the most part have been well controlled    I will likely initiate patient on Eliquis for embolic prophylaxis secondary to prior paroxysmal atrial fibrillation her hemoglobin has remained stable    Patient is tolerating feeds at night followed by nutrition and speech patient underwent modified barium swallow speech assistance and dietitian assistance greatly appreciated    All other ROS negative except noted in HPI    Past, Family, Social History unchanged from admission.     Diet:  ADULT TUBE FEEDING; PEG; Renal Formula; Continuous; 10; Yes; 10; Q 4 hours; 30; 100; Q 4 hours  ADULT DIET; Dysphagia - Minced and Moist; Mildly Thick (Nectar)    Medications:  Scheduled Meds:   [START ON 7/22/2022] amiodarone  200 mg Oral Daily    amiodarone  200 mg Oral BID    sodium bicarbonate  1,300 mg Oral TID    morphine  1 mg IntraVENous Once    ondansetron  4 mg IntraVENous Once    midodrine  7.5 mg Oral TID WC    [Held by provider] bumetanide  1 mg Oral Daily    famotidine  20 mg PEG Tube Daily    folic acid  1 mg Oral Daily    metoprolol tartrate  25 mg Oral BID    sertraline  25 mg Oral Daily    sodium chloride flush  10 mL IntraCATHeter TID    sodium hypochlorite   Irrigation BID    sodium chloride flush  5-40 mL IntraVENous 2 times per day    heparin (porcine)  5,000 Units SubCUTAneous 3 times per day     Continuous Infusions:   dextrose      sodium chloride      sodium chloride       PRN Meds:glucose, dextrose bolus **OR** dextrose bolus, glucagon (rDNA), dextrose, morphine, sodium chloride, acetaminophen, docusate sodium, ipratropium-albuterol, melatonin, sodium chloride flush, sodium chloride, ondansetron **OR** ondansetron, polyethylene glycol    Objective:  CBC:   Recent Labs     07/17/22  0359 07/18/22  0440 07/19/22  0328   WBC 18.2* 22.0* 17.7*   HGB 7.9* 8.2* 8.4*    343 371     BMP:    Recent Labs     07/17/22  0359 07/18/22  0440 07/19/22  0910    140 138   K 3.7 3.9 3.6    108 108   CO2 17* 17* 19*   BUN 82* 85* 82*   CREATININE 3.0* 3.3* 3.2*   GLUCOSE 103 66* 106     Calcium:  Recent Labs     07/19/22  0910   CALCIUM 8.3*     Ionized Calcium:No results for input(s): IONCA in the last 72 hours. Magnesium:No results for input(s): MG in the last 72 hours. Phosphorus:No results for input(s): PHOS in the last 72 hours. BNP:No results for input(s): BNP in the last 72 hours.   Glucose:  Recent Labs     07/18/22  1956 07/19/22  0758 07/19/22  1209   POCGLU 88 118* 124*     HgbA1C: No results for input(s): LABA1C in the last 72 hours. INR: No results for input(s): INR in the last 72 hours. Hepatic: No results for input(s): ALKPHOS, ALT, AST, PROT, BILITOT, BILIDIR, LABALBU in the last 72 hours. Amylase and Lipase:No results for input(s): LACTA, AMYLASE in the last 72 hours. Lactic Acid: No results for input(s): LACTA in the last 72 hours. Troponin: No results for input(s): CKTOTAL, CKMB, TROPONINT in the last 72 hours. BNP: No results for input(s): BNP in the last 72 hours. Lipids: No results for input(s): CHOL, TRIG, HDL, LDL, LDLCALC in the last 72 hours. ABGs:   Lab Results   Component Value Date/Time    PH 7.32 07/15/2022 04:22 AM    PCO2 34 07/15/2022 04:22 AM    PO2 69 07/15/2022 04:22 AM    HCO3 18 07/15/2022 04:22 AM    O2SAT 92 07/15/2022 04:22 AM           Radiology reports as per the Radiologist  Radiology: Echocardiogram 2D/ M-Mode/ Colorflow/ Do    Result Date: 7/18/2022  Transthoracic Echocardiography Report (TTE)  Demographics   Patient Name  Shannon Mosley  Gender             Female                S   MR #          [id number]    Race                                             Ethnicity   Account #     [de-identified]    Room Number        5656   Accession     [id number]   Date of Study      07/18/2022  Number   Date of Birth 1950   Referring          Germain Briggs MD                             Physician          Aki Arambula MD   Age           73 year(s)   Sonographer        JEANNINE Benavides Memos, RDCS,                                                RDMS, RVT                              Interpreting       Germain Briggs MD                             Physician  Procedure Type of Study   TTE procedure:ECHOCARDIOGRAM COMPLETE 2D W DOPPLER W COLOR. Procedure Date Date: 07/18/2022 Start: 01:02 PM Study Location: Bedside Indications:Dyspnea / Shortness of breath.  Additional Medical History:chronic kidney disease, urinary tract infection, hypertension, congestive heart failure, pulmonary hypertension, flash pulmonary edema, PEG tube, colostomy, osteoarthritis Patient Status: Routine Height: 57 inches Weight: 147 pounds BSA: 1.58 m^2 BMI: 31.81 kg/m^2 BP: 124/62 mmHg Allergies   - No Known Allergies.   - No Known Allergies. Conclusions   Summary  Ejection fraction is visually estimated at 55%. Overall left ventricular function is normal.  Moderately dilated left atrium. Aortic valve appears tricuspid. Thickened aortic valve leaflets noted. Aortic valve leaflets are Moderately calcified. Mild aortic stenosis is present. Signature   ----------------------------------------------------------------  Electronically signed by Kulwinder Hester MD (Interpreting  physician) on 07/18/2022 at 04:22 PM  ----------------------------------------------------------------   Findings   Mitral Valve  Myxomatotic degeneration of mitral valve. Calcification of the mitral valve noted. Mild mitral regurgitation is present. Aortic Valve  Aortic valve appears tricuspid. Thickened aortic valve leaflets noted. Aortic valve leaflets are Moderately calcified. Mild aortic stenosis is present. Tricuspid Valve  Mild tricuspid regurgitation visualized. Pulmonic Valve  The pulmonic valve was not well visualized . Trivial pulmonic regurgitation visualized. Left Atrium  Moderately dilated left atrium. Left Ventricle  Ejection fraction is visually estimated at 55%. Overall left ventricular function is normal.   Right Atrium  Right atrial size was normal.   Right Ventricle  The right ventricular size was normal with normal systolic function and  wall thickness. Pericardial Effusion  The pericardium was normal in appearance with no evidence of a pericardial  effusion. Pleural Effusion  No evidence of pleural effusion. Aorta / Great Vessels  -Aortic root dimension within normal limits.  -The Pulmonary artery is within normal limits. -IVC size is within normal limits with normal respiratory phasic changes. M-Mode/2D Measurements & Calculations   LV Diastolic   LV Systolic Dimension:    AV Cusp Separation: 1.2 cmLA  Dimension: 4.2 3.1 cm                    Dimension: 4.2 cmAO Root  cm             LV Volume Diastolic: 95.0 Dimension: 2.4 cmLA Area: 18.3  LV FS:26.2 %   ml                        cm^2  LV PW          LV Volume Systolic: 82.0  Diastolic: 1.4 ml  cm             LV EDV/LV EDV Index: 78.6  Septum         ml/50 m^2LV ESV/LV ESV    RV Diastolic Dimension: 3.6 cm  Diastolic: 0.9 Index: 16.8 ml/24 m^2  cm             EF Calculated: 51.8 %     LA/Aorta: 1.75                                            LA volume/Index: 52 ml /33m^2                  LVOT: 1.9 cm  Doppler Measurements & Calculations   MV Peak E-Wave: 131  AV Peak Velocity: 234    LVOT Peak Velocity: 99.2  cm/s                 cm/s                     cm/s                       AV Peak Gradient: 21.9   LVOT Mean Velocity: 68.4  MV Peak Gradient:    mmHg                     cm/s  6.86 mmHg            AV Mean Velocity: 159    LVOT Peak Gradient: 4                       cm/s                     mmHgLVOT Mean Gradient: 2  MV Deceleration      AV Mean Gradient: 11     mmHg  Time: 135 msec       mmHg                       AV VTI: 41.1 cm          TV Peak E-Wave: 65.6 cm/s                       AV Area  MV E' Septal         (Continuity):1.39 cm^2   TV Peak Gradient: 1.72 mmHg  Velocity: 7.4 cm/s                            TR Velocity:300 cm/s  MV A' Septal         LVOT VTI: 20.2 cm        TR Gradient:36 mmHg  Velocity: 12.9 cm/s  IVRT: 58 msec            PV Peak Velocity: 89.7 cm/s  MV E' Lateral                                 PV Peak Gradient: 3.22 mmHg  Velocity: 8.3 cm/s  MV A' Lateral        AV DVI (VTI): 0.49AV DVI  Velocity: 11.9 cm/s  (Vmax):0.42  E/E' septal: 17.7  E/E' lateral: 15.78  [email or web address] [id number]    CT ABDOMEN PELVIS WO CONTRAST Additional Contrast? None    Result Date: 7/17/2022  PROCEDURE: CT ABDOMEN PELVIS WO CONTRAST CLINICAL INFORMATION: abdominal pain . COMPARISON: CT abdomen pelvis dated 4/12/2022. TECHNIQUE: Axial 5 mm CT images were obtained through the abdomen and pelvis. No contrast was given. Coronal and sagittal reconstructions were created. All CT scans at this facility use dose modulation, iterative reconstruction, and/or weight-based dosing when appropriate to reduce radiation dose to as low as reasonably achievable. FINDINGS:  There are small bilateral pleural effusions with right-sided effusion increased in size compared to prior exam. There is adjacent atelectasis which has increased in the interval. The heart is prominently enlarged, similar to prior exam. There is a percutaneous gastrostomy tube in place, stable compared to prior exam. The unopacified liver is unremarkable. There are rim calcified stones within the gallbladder, similar to prior exam. Adrenal glands are unremarkable. There is a percutaneous nephrostomy tube on the left which has been repositioned at a more inferior aspect of the left kidney compared to prior exam. There are prominent dystrophic calcifications in the left kidney, similar to prior exam. There is adjacent scarring  in stranding. There is fat stranding adjacent to the proximal left ureter and renal pelvis, similar to prior exam. The right kidney is stable compared to prior exam. There are calcified granulomas in the spleen which is mildly prominent, unchanged compared to prior. The pancreas is within normal limits. No retroperitoneal or mesenteric lymphadenopathy is identified. Redemonstration of a colostomy, stable compared to prior exam. Small bowel appears within normal limits. The bladder is decompressed with Padilla catheter in place. The uterus and adnexa are unremarkable. No free fluid is identified.  There is prominent subcutaneous edema in the abdominal wall, pelvic subcutis tissues and visualized upper extremities, increased compared to prior exam. There are stable degenerative changes of the spine. There is again noted to be a prominent sacral decubitus ulcer extending to the bone, similar to prior exam.      1. Small bilateral pleural effusions, increased on the right with increased adjacent atelectasis. 2. Redemonstration of left percutaneous nephrostomy tube which has been adjusted in position in the interval with stable dysmorphic left kidney with dystrophic calcifications. 3. Worsening anasarca. 4. Cholelithiasis. 5. Stable prominent sacral decubitus ulcer. **This report has been created using voice recognition software. It may contain minor errors which are inherent in voice recognition technology. ** Final report electronically signed by Dr. Joaquin Murillo MD on 7/17/2022 9:48 AM    XR INJ CONTRAST GASTRIC TUBE PERC    Result Date: 6/24/2022  PROCEDURE: XR INJ CONTRAST GASTRIC TUBE PERC CLINICAL INFORMATION: PEG replacement . COMPARISON: 1/14/2022. CT abdomen pelvis 4/12/2022. TECHNIQUE: Initially a supine KUB was obtained. A 2nd supine KUB was taken after injecting Gastrografin through the existing gastrostomy tube. FINDINGS: On  image, there is a gastrostomy tube. The tip is superimposed over the mid abdomen. There is a nephrostomy tube in the left kidney. There are no distended bowel loops. There is a tube superimposed over the pelvis. After injection of Gastrografin, contrast is seen in the stomach, duodenal bulb and a proximal small bowel loop. The tip is within the stomach. G-tube tip within the stomach. **This report has been created using voice recognition software. It may contain minor errors which are inherent in voice recognition technology. ** Final report electronically signed by Dr. Malcolm Esparza on 6/24/2022 7:17 AM    FL MODIFIED BARIUM SWALLOW W VIDEO    Result Date: 7/19/2022  PROCEDURE: FL MODIFIED BARIUM SWALLOW W VIDEO CLINICAL INFORMATION: Dysphasia TECHNIQUE: Fluoroscopy was provided for modified barium swallowing study performed by speech therapy. With the patient in the lateral projection, swallowing mechanism was evaluated using barium of various consistencies. The radiologist was available for the entire examination. 13 video clips were obtained. Total fluoroscopy time 1 minutes 43 seconds. Speech therapist: Heena Kauffman COMPARISON: Modified barium swallow study 4/14/2022 FINDINGS: Oral, pharyngeal and esophageal structures appear normal. There is laryngeal penetration of thin barium. No aspiration is identified. 1. Laryngeal penetration of thin barium without evidence of aspiration. 2. Additional recommendations from the speech therapist will follow. **This report has been created using voice recognition software. It may contain minor errors which are inherent in voice recognition technology. ** Final report electronically signed by Dr. Kallie Gilmore on 7/19/2022 11:04 AM    IR GUIDED NEPHROSTOMY CATH EXCHANGE    Result Date: 7/13/2022  NEPHROSTOMY TUBE EXCHANGE: PERFORMED BY: Denae Damon M.D. CLINICAL INFORMATION: Ureteral obstruction. Staghorn calculi. INDWELLING CATHETER: 8 [place]an nephrostomy tube. NEW CATHETER: 8 [place]an nephrostomy tube. FLUOROSCOPY TIME: 1.2 minutes FLUOROSCOPIC IMAGES: 5 ESTIMATED BLOOD LOSS: Minimal SEDATION: Versed 0.5 mg; Dilaudid 0.5 mg, IV; the patient was sedated during this procedure,  and monitored with EKG and pulse ox monitoring devices by a registered nurse. Face-to-face time with patient 10 minutes. PROCEDURE:  Signed informed consent was obtained prior to performing this procedure. Exposed portions of the nephrostomy tube and surrounding skin were prepped and draped in a sterile fashion. Iodinated contrast was injected and a nephrostogram was performed, revealing the nephrostomy tube to be in good position and functioning well. Fluoroscopic images were obtained for documentation.  The neurological deficits noted  Integumentary - Skin color, texture, turgor normal. No Rashes or lesions  Musculoskeletal -Full ROM, No clubbing or cyanosis  Extremities: Bilateral lower extremity occlusive dressings anterior tibia secondary to skin breakdown, posterior sacral decubital ulcers with areas of skin breakdown right posterior thorax area skin breakdown with small amount of pus    Psychiatric, flat affect    DVT prophylaxis: [] Lovenox                                 [] SCDs                                 [] SQ Heparin                                 [] Encourage ambulation           [] Already on Anticoagulation               Electronically signed by Mariano Herrera MD on 7/19/2022 at 3:33 PM    Rounding Hospitalist

## 2022-07-19 NOTE — PROGRESS NOTES
[facility] 62  PHYSICAL THERAPY MISSED TREATMENT NOTE  [facility]U-STEPDOWN 3B    Date: 2022  Patient Name: Evon Mccurdy        MRN: [id number]   : 1950  (73 y.o.)  Gender: female                REASON FOR MISSED TREATMENT:  Missed Treat. Despite encouragement, pt refused therex and mobility without giving a reason why. PT made call to [facility] and her RN there reported that pt had been standing to get up OOB until the last week -she was refusing everything and had needed use of hoyerlift. PT discussed with RN for staff to encourage her to inc her activity and PT will check back as able.

## 2022-07-19 NOTE — PROGRESS NOTES
[address] [place] [address]  SPEECH THERAPY  [facility]Z CCU-STEPDOWN 3B  Modified Barium Swallow & Treatment     SLP Individual Minutes  Time In: 9766  Time Out: 1000  Minutes: 25  Timed Code Treatment Minutes: 0 Minutes     MBS: 15 minutes   Dysphagia Tx: 10 minutes     Date: 2022  Patient Name: Keysha Zhu      CSN: [id number]   : 1950  (73 y.o.)  Gender: female   Referring Physician:  Angelique Ballard MD  Diagnosis: Staghorn calculus  Date of Last MBS/FEES:  FEES completed in 22 - recommending soft & bite size & moderately thick   History of Present Illness/Injury: Per chart review; Patient admitted to [facility] with above med dx; please refer to physician H&P for full details. Patient with admission for sepsis with chronic dysphagia; PEG tube maintains for nocturnal feeds for enteral nutrition/hydration measures. Documentation for variable pharyngeal dysphagia with positive hx for silent aspiration. Most recent FEES completed on 22 consistent for mild-moderate pharyngeal dysphagia, dietary recommendations of soft and bite size with moderately thick liquids. ECF indicates dietary advancement to, \"mechanical soft and thin liquids\". has a past medical history of CHF (congestive heart failure) ([address] Depression, Gout attack, Hemodialysis patient ([address] Hypertension, Osteoarthritis, Pulmonary artery hypertension ([address] Renal calculi, and Thrombocytopenia ([address] Current Diet: Minced & moist solids, mildly thick liquids     Pain: No pain reported. SUBJECTIVE:  Patient seen upright for MBS this date. The pt remains pleasantly confused, however, was able to follow commands and easily participate in MBS and dysphagia treatment immediately to follow the evaluation. No family present on this date. OBJECTIVE:    Respiratory Status:  Room Air    Behavioral Observation:  Alert and Confused    PATIENT WAS EVALUATED USING:  Barium: Thin Liquids, Puree, Soft Solids, Coarse Solids, and Mixed Consistency    ORAL PHASE DORIS SCORE: (Dysphagia outcome and severity scale)  5 = Mild Dysphagia - May have one diet consistency restricted - Mild oral residue but clears    PHARYNGEAL PHASE DORIS SCORE: (Dysphagia outcome and severity scale)  5 = Mild Dysphagia - may need one consistency restricted - May have one or more of the following: Aspiration with thin - cough to clear, Airway penetration midway to the vocal cords with one or more consistency or to the vocal folds with one consistency, but clears spontaneously - Residue in the pharynx clears spontaneously    EVIDENCE FOR LARYNGEAL PENETRATION AND/OR ASPIRATION:  No evidence of aspiration  Laryngeal penetration evident with thin liquids     PENETRATION-ASPIRATION SCALE (PAS): Thin Liquids via single cu/straw drink: 2 = Material enters the airway, remains above vocal folds, and is ejected from the airway  Thin Liquids via consecutive drinks via cup/straw: 3 = Material enters the airway, remains above the vocal folds, and is not ejected from the airway    Puree:  1 = Material does not enter the airway  Soft Solid:  1 = Material does not enter the airway  Mixed Consistencies: 1 = Material does not enter the airway  Hard Solid: 1 = Material does not enter the airway    ESOPHAGEAL PHASE:   No significant findings    ATTEMPTED TECHNIQUES:  Small Bolus Size Effective    Straw Ineffective    Cup Effective    Large Drinks Ineffective    Consecutive Drinks Ineffective    Chin Tuck Unable to achieve positioning    Head Turn Not Attempted    Spoon Presentations Effective    Volitional Cough Ineffective Weak and unproductive    Spontaneous Cough Not Attempted           DIAGNOSTIC IMPRESSIONS:  Patient presented with mild oropharyngeal dysphagia as outlined above. The patient presented with lingual incoordination, and insufficient mastication, resulting in incoordination of bolus formation, AP transit, and limited mastication of soft solids (peaches) in 2/2 trials.  Patient has a timely swallow onset, with limited uncontrolled premature spillage noted during MBS, however, presents with decreased BOT retraction, slow incomplete epiglottic inversion, and reduce thyrohyoid approximation // resulting in residue and reduce airway protection. Residue is mild in nature, remaining in the valleculae and pyriforms, which clears with cues and inconsistent spontaneous swallows. Reduced airway protection is most problematic when pt takes consecutive cup/straw drinks, while VERY functional with single drinks. Given recent FEES with a more desirable PO (water vs barium), the pt consistently took consecutive drinks despite cues. Recommended to remain on minced/moist solids, mildly thick liquids with continued skilled ST and dysphagia treatment to further assess and determine readiness for a diet/liquids upgrade. Diet Recommendations:  minced/moist, mildly thick   Strategies:  Set-up with Meals, Full Upright Position, Small Bite/Sip, Multiple Swallow, Medications Crushed with Puree, Alternate Solids and Liquids, and Limit Distractions   Rehabilitation Potential: good    EDUCATION:  Learner: Patient  Education:  Reviewed results and recommendations of this evaluation, Reviewed diet and strategies, Reviewed ST goals and Plan of Care, and Reviewed recommendations for follow-up  Evaluation of Education: Verbalizes understanding, Needs further instruction, and Family not present    PLAN:  Skilled SLP intervention on acute care 3-5 x per week or until goals met and/or pt plateaus in function. Specific interventions for next session may include: thin liquids trials via single drinks . PATIENT GOAL:    Return to least restrictive diet.     SHORT TERM GOALS:  Short-term Goals  Timeframe for Short-term Goals: 2 weeks  Goal 1: Patient will safely consume mildly thick liquids with minced & moist solids with no overt s/s of airway invasion or pulmonary decline to assist in nutrition/hydrational needs  Goal 2: Patient will complete PO trials of advanced solids and thin liquids (SINGLE DRINKS ONLY) with no overt s/s of airway invasion to assist in nutrition/hydrational needs. Goal 3: PAtient will complete pharyngeal strengthening exercises 5-10x each with fair success to improve BOT retraction, laryngeal elevation, reduce residue and airway protection  Goal 4: Monitor mentation; consider completion of cognitive linguistic evaluation dependent upon baseline status confirmation with familial members. Goal 5: Staff will exhibit return demonstration for completion of comprehensive oral care procedural analysis to maximize oral integrity and to reduce potential bacteria colonization. INTERVENTION:   Immediately following MBS, the pt was given skilled education and review of swallow study via visual & verbal model-- watching MBS (pause, slow motion), and detailed explanation of anatomy & physiology, why she is at a high risk of aspiration, and what she can do to improve airway protection. Provided rationale for diet recommendation d/t evidence of aforementioned MBS. ST reviewed potential implications (pneumonia, recurrent/prolonged hospitalizations, medical decline) if further PO intake was desired to be consumed and/or non-compliance with recommendations/diet modifications. Patient would also benefit from further education re: needs to engage in comprehensive oral care in attempts to reduce potential bacteria colonization and to maximize oral integrity as well as to engage in ambulation with staffing (as able) to assist with mobility and overall pulmonary health. *Verbal receptiveness noted. *Will continue dysphagia education and skilled dysphagia tx within future sessions. Completed x2 thin liquid trials (H2O) with patient presenting with a wet vocal quality 1/2 trials. PT was cued to complete cough + re-swallow, requiring mod verbal and demonstration cues.  Completed further rehearsal and training with the inclusion of deep breathing, diaphragmatic breathing to improve cough strength + effortful swallows. Completed this sequence x4. LONG TERM GOALS:  No LTM Goals recommended, due to anticipated short ELOS. Werner Donaldson MA., Dez Kelly / KIMBERLY#.[id number]

## 2022-07-20 ENCOUNTER — TELEPHONE (OUTPATIENT)
Dept: UROLOGY | Age: 72
End: 2022-07-20

## 2022-07-20 LAB
ANION GAP SERPL CALCULATED.3IONS-SCNC: 13 MEQ/L (ref 8–16)
BUN BLDV-MCNC: 84 MG/DL (ref 7–22)
CALCIUM SERPL-MCNC: 8 MG/DL (ref 8.5–10.5)
CHLORIDE BLD-SCNC: 109 MEQ/L (ref 98–111)
CO2: 20 MEQ/L (ref 23–33)
CREAT SERPL-MCNC: 3.7 MG/DL (ref 0.4–1.2)
ERYTHROCYTE [DISTWIDTH] IN BLOOD BY AUTOMATED COUNT: 18 % (ref 11.5–14.5)
ERYTHROCYTE [DISTWIDTH] IN BLOOD BY AUTOMATED COUNT: 63.8 FL (ref 35–45)
GFR SERPL CREATININE-BSD FRML MDRD: 12 ML/MIN/1.73M2
GLUCOSE BLD-MCNC: 100 MG/DL (ref 70–108)
GLUCOSE BLD-MCNC: 107 MG/DL (ref 70–108)
GLUCOSE BLD-MCNC: 111 MG/DL (ref 70–108)
GLUCOSE BLD-MCNC: 115 MG/DL (ref 70–108)
GLUCOSE BLD-MCNC: 126 MG/DL (ref 70–108)
HCT VFR BLD CALC: 31.1 % (ref 37–47)
HEMOGLOBIN: 8.4 GM/DL (ref 12–16)
MCH RBC QN AUTO: 26 PG (ref 26–33)
MCHC RBC AUTO-ENTMCNC: 27 GM/DL (ref 32.2–35.5)
MCV RBC AUTO: 96.3 FL (ref 81–99)
PLATELET # BLD: 312 THOU/MM3 (ref 130–400)
PMV BLD AUTO: 8.7 FL (ref 9.4–12.4)
POTASSIUM SERPL-SCNC: 4 MEQ/L (ref 3.5–5.2)
RBC # BLD: 3.23 MILL/MM3 (ref 4.2–5.4)
SODIUM BLD-SCNC: 142 MEQ/L (ref 135–145)
WBC # BLD: 15.9 THOU/MM3 (ref 4.8–10.8)

## 2022-07-20 PROCEDURE — 6360000002 HC RX W HCPCS: Performed by: INTERNAL MEDICINE

## 2022-07-20 PROCEDURE — 80048 BASIC METABOLIC PNL TOTAL CA: CPT

## 2022-07-20 PROCEDURE — 6370000000 HC RX 637 (ALT 250 FOR IP): Performed by: NURSE PRACTITIONER

## 2022-07-20 PROCEDURE — 6360000002 HC RX W HCPCS: Performed by: UROLOGY

## 2022-07-20 PROCEDURE — 82948 REAGENT STRIP/BLOOD GLUCOSE: CPT

## 2022-07-20 PROCEDURE — 1200000003 HC TELEMETRY R&B

## 2022-07-20 PROCEDURE — 6370000000 HC RX 637 (ALT 250 FOR IP): Performed by: INTERNAL MEDICINE

## 2022-07-20 PROCEDURE — 85027 COMPLETE CBC AUTOMATED: CPT

## 2022-07-20 PROCEDURE — 36415 COLL VENOUS BLD VENIPUNCTURE: CPT

## 2022-07-20 PROCEDURE — 99232 SBSQ HOSP IP/OBS MODERATE 35: CPT | Performed by: INTERNAL MEDICINE

## 2022-07-20 PROCEDURE — 2580000003 HC RX 258: Performed by: UROLOGY

## 2022-07-20 PROCEDURE — 6370000000 HC RX 637 (ALT 250 FOR IP): Performed by: UROLOGY

## 2022-07-20 PROCEDURE — 6370000000 HC RX 637 (ALT 250 FOR IP)

## 2022-07-20 PROCEDURE — 99232 SBSQ HOSP IP/OBS MODERATE 35: CPT | Performed by: UROLOGY

## 2022-07-20 PROCEDURE — 87086 URINE CULTURE/COLONY COUNT: CPT

## 2022-07-20 RX ADMIN — HEPARIN SODIUM 5000 UNITS: 5000 INJECTION INTRAVENOUS; SUBCUTANEOUS at 14:11

## 2022-07-20 RX ADMIN — SODIUM BICARBONATE 1300 MG: 650 TABLET ORAL at 08:52

## 2022-07-20 RX ADMIN — SODIUM CHLORIDE, PRESERVATIVE FREE 10 ML: 5 INJECTION INTRAVENOUS at 08:55

## 2022-07-20 RX ADMIN — SODIUM BICARBONATE 1300 MG: 650 TABLET ORAL at 20:33

## 2022-07-20 RX ADMIN — MORPHINE SULFATE 1 MG: 2 INJECTION, SOLUTION INTRAMUSCULAR; INTRAVENOUS at 15:39

## 2022-07-20 RX ADMIN — DAKIN'S SOLUTION 0.125% (QUARTER STRENGTH): 0.12 SOLUTION at 08:55

## 2022-07-20 RX ADMIN — METOPROLOL TARTRATE 25 MG: 25 TABLET, FILM COATED ORAL at 20:34

## 2022-07-20 RX ADMIN — FOLIC ACID 1 MG: 1 TABLET ORAL at 08:52

## 2022-07-20 RX ADMIN — SODIUM CHLORIDE, PRESERVATIVE FREE 10 ML: 5 INJECTION INTRAVENOUS at 14:11

## 2022-07-20 RX ADMIN — APIXABAN 2.5 MG: 2.5 TABLET, FILM COATED ORAL at 20:34

## 2022-07-20 RX ADMIN — SERTRALINE 25 MG: 50 TABLET, FILM COATED ORAL at 08:52

## 2022-07-20 RX ADMIN — METOPROLOL TARTRATE 25 MG: 25 TABLET, FILM COATED ORAL at 08:52

## 2022-07-20 RX ADMIN — HEPARIN SODIUM 5000 UNITS: 5000 INJECTION INTRAVENOUS; SUBCUTANEOUS at 04:22

## 2022-07-20 RX ADMIN — MIDODRINE HYDROCHLORIDE 7.5 MG: 2.5 TABLET ORAL at 08:52

## 2022-07-20 RX ADMIN — SODIUM CHLORIDE, PRESERVATIVE FREE 10 ML: 5 INJECTION INTRAVENOUS at 20:35

## 2022-07-20 RX ADMIN — SODIUM CHLORIDE, PRESERVATIVE FREE 10 ML: 5 INJECTION INTRAVENOUS at 20:34

## 2022-07-20 RX ADMIN — AMIODARONE HYDROCHLORIDE 200 MG: 200 TABLET ORAL at 08:51

## 2022-07-20 RX ADMIN — AMIODARONE HYDROCHLORIDE 200 MG: 200 TABLET ORAL at 20:34

## 2022-07-20 RX ADMIN — MIDODRINE HYDROCHLORIDE 7.5 MG: 2.5 TABLET ORAL at 12:26

## 2022-07-20 RX ADMIN — SODIUM CHLORIDE, PRESERVATIVE FREE 10 ML: 5 INJECTION INTRAVENOUS at 08:52

## 2022-07-20 RX ADMIN — FAMOTIDINE 20 MG: 20 TABLET ORAL at 08:52

## 2022-07-20 ASSESSMENT — PAIN DESCRIPTION - ORIENTATION: ORIENTATION: LEFT

## 2022-07-20 ASSESSMENT — PAIN DESCRIPTION - LOCATION: LOCATION: LEG

## 2022-07-20 ASSESSMENT — PAIN - FUNCTIONAL ASSESSMENT: PAIN_FUNCTIONAL_ASSESSMENT: PREVENTS OR INTERFERES SOME ACTIVE ACTIVITIES AND ADLS

## 2022-07-20 ASSESSMENT — PAIN SCALES - GENERAL: PAINLEVEL_OUTOF10: 10

## 2022-07-20 ASSESSMENT — PAIN DESCRIPTION - DESCRIPTORS: DESCRIPTORS: PINS AND NEEDLES;ACHING

## 2022-07-20 NOTE — PLAN OF CARE
Ebony Rodríguez RN  Outcome: Progressing  Flowsheets (Taken 7/20/2022 0830)  Discharge to home or other facility with appropriate resources:   Identify barriers to discharge with patient and caregiver   Arrange for needed discharge resources and transportation as appropriate   Identify discharge learning needs (meds, wound care, etc)     Problem: Nutrition Deficit:  Goal: Optimize nutritional status  7/20/2022 1426 by Raciel Angel RN  Outcome: Progressing  Flowsheets  Taken 7/20/2022 1426 by Raciel Angel RN  Nutrient intake appropriate for improving, restoring, or maintaining nutritional needs:   Assess nutritional status and recommend course of action   Monitor oral intake, labs, and treatment plans  Taken 7/20/2022 1212 by Marcelina Sheikh RD  Nutrient intake appropriate for improving, restoring, or maintaining nutritional needs:   Assess nutritional status and recommend course of action   Monitor oral intake, labs, and treatment plans   Recommend appropriate diets, oral nutritional supplements, and vitamin/mineral supplements   Order, calculate, and assess calorie counts as needed     Problem: Pain  Goal: Verbalizes/displays adequate comfort level or baseline comfort level  7/20/2022 1426 by Raciel Angel RN  Outcome: Progressing  Note: Ongoing assessment & interventions provided throughout shift. Reminded patient to report any pain, pressure, or shortness of breath to the nurse. Pain medications provided per physician's orders. Care plan reviewed with patient. Patient verbalizes understanding of the care plan and contributed to goal setting.

## 2022-07-20 NOTE — PROGRESS NOTES
Comprehensive Nutrition Assessment    Type and Reason for Visit:  Reassess    Nutrition Recommendations/Plan:   Diet as per SLP   Will resume nocturnal TF as per ECF- Nepro carb steady at 55 ml/hr from 6p-6a  Free water flushes per MD (previously 100 ml q 4 hrs on continuous)      Malnutrition Assessment:  Malnutrition Status: At risk for malnutrition (Comment) (07/12/22 1326)    Context:  Chronic Illness     Findings of the 6 clinical characteristics of malnutrition:  Energy Intake:  No significant decrease in energy intake (patient has been receiving tube feeding since 11/24/21, tolerating at [facility] prior to admit)  Weight Loss:  Unable to assess (due to CHF/ hemodialysis)     Body Fat Loss:  No significant body fat loss     Muscle Mass Loss:  No significant muscle mass loss    Fluid Accumulation:  Unable to assess     Strength:  Not Performed    Nutrition Assessment:     Pt. with minimal improvement from a nutritional standpoint AEB diet NPO and TF held temporarily d/t leakage at peg site. At risk for further nutrition compromise r/t admit for preop antibiotics prior to nephrostomy tube exchange, hx obstructive staghorn calculus of left kidney, nephrostomy tube placement- 7/13/22, patient with purulent drainage from left nephrostomy tube, hypotension, ALVIN on CKD stage III, hemodialysis patient, dysphagia, increased nutrient needs for wound healing, chronically bed bound, underlying medical condition (CHF, 1/27/22 diverting colostomy, depression, gout, OA). Nutrition Related Findings:    Pt. Report/Treatments/Miscellaneous: Pt seen- reported no appetite or po intake; (didn't touch breakfast tray); Will switch back to nocturnal TF regimen with free water flushes per MD due to pt preference and tolerance of TF; Spoke with RN about change in TF regimen (she stated pt was tolerating well with no leakage at PEG site; Plans to return to ECF.    GI Status: Last 24 hrs; 175 ml via colostomy  Pertinent Labs: Na 142, BUN 84, Cr 3.7, GFR 12, Glucose 100, Hgb 8.4, WBC 15.9  Pertinent Meds: bumex, pepcid, folvite, midodrine     Wound Type:  (unstageable wounds to right and left posterior lower legs, right posterior heel, and left second toe; evolving DTPI to right hip and ischium; stage 4 sacrum)       Current Nutrition Intake & Therapies:    Average Meal Intake:  (minimal po intake)  Average Supplements Intake: None Ordered (pt doesn't like most ONS)  ADULT TUBE FEEDING; PEG; Renal Formula; Continuous; 10; Yes; 10; Q 4 hours; 30; 100; Q 4 hours  ADULT DIET; Dysphagia - Minced and Moist; Mildly Thick (Nectar)  Current Tube Feeding (TF) Orders:  Feeding Route: PEG  Formula: Renal Formula  Schedule: Cyclic  Feeding Regimen: Initiate nepro @ 10 ml/hr, advance by 10 ml q 4 hours or as tolerated until goal rate of 30 ml/hr is reached. Additives/Modulars: None  Water Flushes: Per Nephrology (previous recomendations 100 ml q 4 hours) (600 ml/day)  Current TF & Flush Orders Provides: ECF regimen - Nepro carb steady at 55 ml/hr from 6p-6a w/ 30 ml free water flush at start and stop of TF - provides 1168 kcals, 53 gm protein, 106 gm CHO, 16.5 gm fiber, 540 ml free water (480 TF, 60 flushes) in 720 ml volume (660 TF, 60 flushes)/24 hours  Goal TF & Flush Orders Provides: Nepro carb steady at 55 ml/hr from 6p-6a w/ 30 ml free water flush at start and stop of TF - provides 1168 kcals, 53 gm protein, 106 gm CHO, 16.5 gm fiber, 1080 ml free water (480 TF, 600 flushes) in 1260 ml volume (660 TF, 600 flushes)/24 hours    Anthropometric Measures:  Height: 4' 9\" (144.8 cm)  Ideal Body Weight (IBW): 85 lbs (39 kg)    Admission Body Weight: 141 lb 3.2 oz (64 kg) (7/11/22; no edema)  Current Body Weight: 150 lb (68 kg) (7/19 BLE non-pitting), 166.1 % IBW.  Weight Source: Bed Scale  Current BMI (kg/m2): 32.5  Usual Body Weight:  (per EMR: 10/17/21 164#, 1/20/22 144# 13.5oz, 3/13/22 133# bedscale, 4/11/22 134#, 4/26/22 175# 11.3oz prior to dialysis; per ECF weights: 2/4/22 144#, 4/5/22 133#)     Weight Adjustment For: No Adjustment                 BMI Categories: Obese Class 1 (BMI 30.0-34. 9)    Estimated Daily Nutrient Needs:  Energy Requirements Based On: Kcal/kg  Weight Used for Energy Requirements: Admission (64kgm on 7/11)  Energy (kcal/day): 8964-3470 kcals (20-22)  Weight Used for Protein Requirements: Ideal (42kgm)  Protein (g/day): 50-63 grams (1.2-1.5) pending renal HD/ wound status  Method Used for Fluid Requirements: Other (Comment)  Fluid (ml/day): per Nephrology    Nutrition Diagnosis:   Inadequate oral intake related to inadequate protein-energy intake as evidenced by poor intake prior to admission, nutrition support - enteral nutrition    Nutrition Interventions:   Food and/or Nutrient Delivery: Continue Current Diet, Start Tube Feeding  Nutrition Education/Counseling: No recommendation at this time  Coordination of Nutrition Care: Continue to monitor while inpatient       Goals:  Previous Goal Met: Progressing toward Goal(s)  Goals: Meet at least 75% of estimated needs, by next RD assessment       Nutrition Monitoring and Evaluation:   Behavioral-Environmental Outcomes: None Identified  Food/Nutrient Intake Outcomes: Diet Advancement/Tolerance, Enteral Nutrition Intake/Tolerance  Physical Signs/Symptoms Outcomes: Biochemical Data, Chewing or Swallowing, GI Status, Fluid Status or Edema, Weight, Skin, Nutrition Focused Physical Findings    Discharge Planning:     Too soon to determine     Shannon Lawson N [address] [place] [address]  Contact: [phone]

## 2022-07-20 NOTE — PLAN OF CARE
Problem: Chronic Conditions and Co-morbidities  Goal: Patient's chronic conditions and co-morbidity symptoms are monitored and maintained or improved  Outcome: Progressing     Problem: Safety - Adult  Goal: Free from fall injury  7/20/2022 0233 by Sondra Ribeiro RN  Outcome: Progressing  Note: Bed locked & in low position, call light in reach, side-rails up x2, bed/chair alarm utilized, non-slip socks on when ambulating, reminded patient to use call light to call for assistance. 7/19/2022 1713 by Lizzy Lala RN  Outcome: Progressing  Note: Patient safety maintained. No falls or injuries to this point in shift. Will continue to monitor. Problem: ABCDS Injury Assessment  Goal: Absence of physical injury  Outcome: Progressing     Problem: Skin/Tissue Integrity  Goal: Absence of new skin breakdown  Description: 1. Monitor for areas of redness and/or skin breakdown  2. Assess vascular access sites hourly  3. Every 4-6 hours minimum:  Change oxygen saturation probe site  4. Every 4-6 hours:  If on nasal continuous positive airway pressure, respiratory therapy assess nares and determine need for appliance change or resting period. 7/20/2022 0233 by Sondra Ribeiro RN  Outcome: Progressing  Note: Ongoing assessment & interventions provided throughout shift. Skin assessments provided. Encouraging/assisting patient to turn as needed. Wound care provided per order  7/19/2022 1713 by Lizzy Lala RN  Outcome: Progressing  Note: Patient with known skin breakdown on coccyx, right hip, bilateral toes, bilateral heels, back of right calf. No new skin breakdown noted. Will continue monitor.       Problem: Discharge Planning  Goal: Discharge to home or other facility with appropriate resources  Outcome: Progressing     Problem: Nutrition Deficit:  Goal: Optimize nutritional status  Outcome: Progressing  Note: Patient remains on tube feeding and tolerating well      Problem: Pain  Goal: Verbalizes/displays adequate comfort level or baseline comfort level  Outcome: Progressing  Note: Ongoing assessment & interventions provided throughout shift. Reminded patient to report any pain, pressure, or shortness of breath to the nurse. Pain medications provided per physician's orders. Care plan reviewed with patient. Patient verbalizes understanding of the care plan and contributed to goal setting.

## 2022-07-20 NOTE — PROGRESS NOTES
[facility] THERAPY MISSED TREATMENT NOTE  STRZ CCU-STEPDOWN 3B      Date: 2022  Patient Name: Noemy Young        MRN: [id number]    : 1950  (73 y.o.)    REASON FOR MISSED TREATMENT: Patient NOT agreeable to complete dysphagia treatment on this date. The pt is reporting pain, but unable to rate pain. ST to complete  dysphagia tx on a different date/time as clinically appropriate and agreeable. Werner Wills MA., Primo / IRIS#.[id number]

## 2022-07-20 NOTE — PROGRESS NOTES
Urology Progress Note    Chief Complaint: nephrostomy tube exchange    Subjective: \"    Patient is resting in bed, voiding yellow urine via seaman, concentrated urine via left nephrostomy tube, colostomy draining, tolerating tube feeds, denies any nausea or vomiting. There are complaints of no pain at this time.         Vitals:  BP (!) 113/55   Pulse 83   Temp 97.9 °F (36.6 °C) (Oral)   Resp 17   Ht 4' 9\" (1.448 m)   Wt 150 lb (68 kg)   SpO2 98%   PF (!) 20 L/min   BMI 32.46 kg/m²   Temp  Av.9 °F (36.6 °C)  Min: 97.7 °F (36.5 °C)  Max: 98.1 °F (36.7 °C)    Intake/Output Summary (Last 24 hours) at 2022 1122  Last data filed at 2022 0744  Gross per 24 hour   Intake 1170 ml   Output 546 ml   Net 624 ml         Social History     Socioeconomic History    Marital status: Single     Spouse name: Not on file    Number of children: 0    Years of education: Not on file    Highest education level: Not on file   Occupational History    Not on file   Tobacco Use    Smoking status: Never    Smokeless tobacco: Never   Vaping Use    Vaping Use: Never used   Substance and Sexual Activity    Alcohol use: No    Drug use: No    Sexual activity: Not Currently   Other Topics Concern    Not on file   Social History Narrative    Not on file     Social Determinants of Health     Financial Resource Strain: Not on file   Food Insecurity: Not on file   Transportation Needs: Not on file   Physical Activity: Not on file   Stress: Not on file   Social Connections: Not on file   Intimate Partner Violence: Not on file   Housing Stability: Not on file     Family History   Problem Relation Age of Onset    Diabetes Father     Arthritis Mother     COPD Mother     Diabetes Sister     Heart Disease Maternal Uncle     Breast Cancer Niece 36    Sleep Apnea Brother     Asthma Neg Hx     Birth Defects Neg Hx     Cancer Neg Hx     Depression Neg Hx     Early Death Neg Hx     Hearing Loss Neg Hx     High Blood Pressure Neg Hx     High Cholesterol Neg Hx     Kidney Disease Neg Hx     Learning Disabilities Neg Hx     Mental Illness Neg Hx     Mental Retardation Neg Hx     Miscarriages / Stillbirths Neg Hx     Stroke Neg Hx     Substance Abuse Neg Hx     Vision Loss Neg Hx     Other Neg Hx      No Known Allergies      Constitutional: Alert and oriented to person, place, time. No acute distress. Smiling  HEENT:   Head:              Normocephalic and atraumatic. Mucous membranes are dry  Eyes:              EOM are normal. No scleral icterus. Nose:              The external appearance of the nose is normal  Ears: The ears appear normal to external inspection. Cardiovascular:       fast, irregular rate  Pulmonary/Chest:  Normal respiratory rate and rhthym. No use of accessory muscles. Lungs diminished bilaterally  Abdominal:              Soft. No tenderness. Active bowel sounds. Colostomy draining. Left neph draining concentrated yellow urine  Genitalia:               Seaman catheter draining yellow urine  Neurological:              Alert and oriented. Labs:  WBC:    Lab Results   Component Value Date/Time    WBC 15.9 07/20/2022 03:48 AM     Hemoglobin/Hematocrit:    Lab Results   Component Value Date/Time    HGB 8.4 07/20/2022 03:48 AM    HCT 31.1 07/20/2022 03:48 AM     BMP:    Lab Results   Component Value Date/Time     07/20/2022 03:48 AM    K 4.0 07/20/2022 03:48 AM    K 4.1 03/14/2022 04:44 AM     07/20/2022 03:48 AM    CO2 20 07/20/2022 03:48 AM    BUN 84 07/20/2022 03:48 AM    LABALBU 1.9 04/16/2022 05:38 AM    CREATININE 3.7 07/20/2022 03:48 AM    CALCIUM 8.0 07/20/2022 03:48 AM    LABGLOM 12 07/20/2022 03:48 AM       Impression/Plan  Sepsis secondary to CAUTI- On Zosyn. Neph tube exchanged 7/13. Pt having tachycardia, lower bp, and low grade temp today. Exchange seaman catheter and obtain urine culture off new catheter.   Labs and blood cultures ordered per hospitalist.  Juanita Antoine assistance  L staghorn calculus-managed chronically with nephrostomy tube with routine exchanges. Follows with Dr. Michael Shahid.   Plan every 2 month nephrostomy tube exchanges with admission prior to exchange for pre-procedural antibiotic therapy  Hypotension - resolved  Tachycardia  ALVIN on CKD requiring dialysis 2 x weekly for last 2-3 mos--no plans for dialysis per nephrology  Anemia  Dysphagia  Chronic HF  Decubitus ulcers with diverting colostomy  Anxiety     Afib controlled, plan for discharge soon  Per nurse she is refusing medications and PT/OT  No fevers, BP stabilized over the weekend  Tolerating tube feeds     Ok for discharged from Urology standpoint  Our office will coordinate follow up  Will need routine nephrostomy exchange per IR schedule    LANE Salmeron - CNP, LANE  07/20/22 11:22 AM  Urology

## 2022-07-20 NOTE — PROGRESS NOTES
Progress note: Infectious diseases    Patient - Ger Gilman,  Age - 73 y.o.    - 1950      Room Number - 3B-32/032-A   MRN -  [id number]   Acct # - [de-identified]  Date of Admission -  2022  8:49 AM    SUBJECTIVE:     No acute events overnight. Pt remains afebrile. Unable to rate pain. Leukocytosis is trending down. OBJECTIVE   VITALS    height is 4' 9\" (1.448 m) and weight is 150 lb (68 kg). Her oral temperature is 98 °F (36.7 °C). Her blood pressure is 103/54 (abnormal) and her pulse is 80. Her respiration is 18 and oxygen saturation is 100%. Wt Readings from Last 3 Encounters:   22 150 lb (68 kg)   22 140 lb (63.5 kg)   22 140 lb (63.5 kg)       I/O (24 Hours)    Intake/Output Summary (Last 24 hours) at 2022 0800  Last data filed at 2022 0744  Gross per 24 hour   Intake 1240 ml   Output 546 ml   Net 694 ml       General Appearance  Awake, chronically sick looking  HEENT - normocephalic, atraumatic, pale conjunctiva,  anicteric sclera  Neck - Supple, no mass  Lungs -  Bilateral  air entry, no rhonchi, no wheeze  Cardiovascular - Heart sounds are normal.     Abdomen - soft, not distended, nontender, red area around the peg tube, leak noted around the peg tube left nephrostomy tube, seaman in place. Neurologic -awake chronically sick looking  Skin - No bruising or bleeding  Extremities - multiple wounds on extremites.     MEDICATIONS:      [START ON 2022] amiodarone  200 mg Oral Daily    amiodarone  200 mg Oral BID    sodium bicarbonate  1,300 mg Oral TID    morphine  1 mg IntraVENous Once    ondansetron  4 mg IntraVENous Once    midodrine  7.5 mg Oral TID WC    [Held by provider] bumetanide  1 mg Oral Daily    famotidine  20 mg PEG Tube Daily    folic acid  1 mg Oral Daily    metoprolol tartrate  25 mg Oral BID    sertraline  25 mg Oral Daily    sodium chloride flush  10 mL IntraCATHeter TID    sodium hypochlorite   Irrigation BID    sodium chloride flush  5-40 mL IntraVENous 2 times per day    heparin (porcine)  5,000 Units SubCUTAneous 3 times per day      dextrose      sodium chloride      sodium chloride       glucose, dextrose bolus **OR** dextrose bolus, glucagon (rDNA), dextrose, morphine, sodium chloride, acetaminophen, docusate sodium, ipratropium-albuterol, melatonin, sodium chloride flush, sodium chloride, ondansetron **OR** ondansetron, polyethylene glycol      LABS:     CBC:   Recent Labs     07/18/22  0440 07/19/22  0328 07/20/22  0348   WBC 22.0* 17.7* 15.9*   HGB 8.2* 8.4* 8.4*    371 312     BMP:    Recent Labs     07/18/22  0440 07/19/22  0910 07/20/22  0348    138 142   K 3.9 3.6 4.0    108 109   CO2 17* 19* 20*   BUN 85* 82* 84*   CREATININE 3.3* 3.2* 3.7*   GLUCOSE 66* 106 100     Calcium:  Recent Labs     07/20/22  0348   CALCIUM 8.0*      Recent Labs     07/19/22  1209 07/19/22  1621 07/19/22  1944   POCGLU 124* 123* 114*      CULTURES:   UA: No results for input(s): SPECGRAV, PHUR, COLORU, CLARITYU, MUCUS, PROTEINU, BLOODU, RBCUA, WBCUA, BACTERIA, NITRU, GLUCOSEU, BILIRUBINUR, UROBILINOGEN, KETUA, LABCAST, LABCASTTY, AMORPHOS in the last 72 hours. Invalid input(s): CRYSTALS  Micro:   Lab Results   Component Value Date/Time    BC No growth-preliminary No growth  07/11/2022 02:23 PM    BC No growth-preliminary No growth  07/11/2022 02:23 PM          Problem list of patient:     Patient Active Problem List   Diagnosis Code    Essential hypertension I10    Chronic depression F32. A    CHF (congestive heart failure) (HCC) I50.9    Thrombocytopenia (HCC) D69.6    Moderate episode of recurrent major depressive disorder (HCC) F33.1    Uremia N19    Hyperkalemia E87.5    Isolated non-nephrotic proteinuria R80.0    ALVIN (acute kidney injury) ([facility] [address] N17.9    Chronic right-sided heart failure (HCC) I50.812    Pulmonary HTN (HCC) I27.20    Hypotension due

## 2022-07-20 NOTE — PROGRESS NOTES
Progress Note    Patient:  Shaniqua Hawley    Unit/Bed:3B-32/032-A  YOB: 1950  MRN: [id number]   Acct: [de-identified]   Admit date: 7/11/2022      Principal Problem:    Kidney stone  Active Problems:    Urinary tract infection associated with nephrostomy catheter (HCC)    Hyponatremia    CKD (chronic kidney disease), stage IV ([address]  Resolved Problems:    * No resolved hospital problems.  *    Disposition anticipate discharging patient to skilled nursing facility if she does not require further inpatient hemodialysis      Assessment and Plan:  Atrial fibrillation with RVR, rate controlled cardiology assistance appreciated patient on p.o. amiodarone sinus rhythm noted on telemetry by cardiology with PACs, patient required PRBC transfusion 7/12/2022 I suspect from chronic kidney disease and impaired erythropoietin secretion anticipate transitioning the patient to p.o. Eliquis next several days for embolic prophylaxis from underlying paroxysmal atrial fibrillation  sepsis from suspected catheter associated urinary tract infection, patient appears to have recurrent urinary tract infections and is prone to these and with multidrug resistant organisms, this is approximately day 6 of Zosyn that has been given, off of Zosyn at this time I will follow infectious disease recommendations she has not had any myesha fevers overnight I will continue this for now and perform further chart review she appears to have staghorn calculi and nephrostomy tubes in place she appears to be a poor surgical candidate for urologic directed removal of staghorn calculi, sepsis seems to have resolved  Chronic kidney disease stage III-IV with  Acute kidney injury we will follow nephrology recommendations continue IV isotonic fluids at current rate and continue bicarbonate offset acidosis with chronic kidney disease, will undergo hemodialysis today  Stage IV decubitus ulceration, follow wound care recommendations these are also possible sources of infection for pulm #2  Dysphagia patient appears to have profound dysphagia and is followed closely by speech therapy of able to touch base with him today and will withhold any type of p.o. intake secondary to likely dysphagia underlying and possible aspiration patient is aware of plan of care [place] [facility] [place]  Chronic bedbound status, require further chart review but this patient appears to be deconditioned and does not appear to be ambulatory at her baseline  Symptomatic anemia with underlying chronic kidney disease likely secondary to concomitant chronic inflammation from stage IV decubitus ulcerations and chronic kidney disease, patient required PRBC transfusion 7/12/2022, hemoglobin stable do not suspect acute blood loss suspect gradual decrease and blood counts related to chronic kidney disease  Anasarca likely due to liver dysfunction with April 2022 serum albumin of 1.8 g/dL        Patient Seen, Chart, Consults notes, Labs, Radiology studies reviewed. Subjective: Day 5 of stay with hospital course  No acute decompensation overnight appears to have a sinus rhythm on telemetry, has a flat affect but no acute complaints, patient's family member updated at bedside  Will initiate Eliquis  Patient is tolerating continuous feeds through PEG tube at night and appears to be tolerating limited p.o. intake after modified barium swallow and evaluation by speech    All other ROS negative except noted in HPI    Past, Family, Social History unchanged from admission. Diet:  ADULT DIET;  Dysphagia - Minced and Moist; Mildly Thick (Nectar)  ADULT TUBE FEEDING; PEG; Renal Formula; Continuous; 55; No; 150; Q 3 hours    Medications:  Scheduled Meds:   [START ON 7/22/2022] amiodarone  200 mg Oral Daily    amiodarone  200 mg Oral BID    sodium bicarbonate  1,300 mg Oral TID    morphine  1 mg IntraVENous Once    ondansetron  4 mg IntraVENous Once    midodrine  7.5 mg Oral TID WC [Held by provider] bumetanide  1 mg Oral Daily    famotidine  20 mg PEG Tube Daily    folic acid  1 mg Oral Daily    metoprolol tartrate  25 mg Oral BID    sertraline  25 mg Oral Daily    sodium chloride flush  10 mL IntraCATHeter TID    sodium hypochlorite   Irrigation BID    sodium chloride flush  5-40 mL IntraVENous 2 times per day    heparin (porcine)  5,000 Units SubCUTAneous 3 times per day     Continuous Infusions:   dextrose      sodium chloride      sodium chloride       PRN Meds:glucose, dextrose bolus **OR** dextrose bolus, glucagon (rDNA), dextrose, morphine, sodium chloride, acetaminophen, docusate sodium, ipratropium-albuterol, melatonin, sodium chloride flush, sodium chloride, ondansetron **OR** ondansetron, polyethylene glycol    Objective:  CBC:   Recent Labs     07/18/22  0440 07/19/22  0328 07/20/22  0348   WBC 22.0* 17.7* 15.9*   HGB 8.2* 8.4* 8.4*    371 312     BMP:    Recent Labs     07/18/22  0440 07/19/22  0910 07/20/22  0348    138 142   K 3.9 3.6 4.0    108 109   CO2 17* 19* 20*   BUN 85* 82* 84*   CREATININE 3.3* 3.2* 3.7*   GLUCOSE 66* 106 100     Calcium:  Recent Labs     07/20/22  0348   CALCIUM 8.0*     Ionized Calcium:No results for input(s): IONCA in the last 72 hours. Magnesium:No results for input(s): MG in the last 72 hours. Phosphorus:No results for input(s): PHOS in the last 72 hours. BNP:No results for input(s): BNP in the last 72 hours. Glucose:  Recent Labs     07/19/22  1944 07/20/22  0757 07/20/22  1233   POCGLU 114* 111* 126*     HgbA1C: No results for input(s): LABA1C in the last 72 hours. INR: No results for input(s): INR in the last 72 hours. Hepatic: No results for input(s): ALKPHOS, ALT, AST, PROT, BILITOT, BILIDIR, LABALBU in the last 72 hours. Amylase and Lipase:No results for input(s): LACTA, AMYLASE in the last 72 hours. Lactic Acid: No results for input(s): LACTA in the last 72 hours.   Troponin: No results for input(s): CKTOTAL, CKMB, Aortic valve appears tricuspid. Thickened aortic valve leaflets noted. Aortic valve leaflets are Moderately calcified. Mild aortic stenosis is present. Signature   ----------------------------------------------------------------  Electronically signed by Ximena Lawton MD (Interpreting  physician) on 07/18/2022 at 04:22 PM  ----------------------------------------------------------------   Findings   Mitral Valve  Myxomatotic degeneration of mitral valve. Calcification of the mitral valve noted. Mild mitral regurgitation is present. Aortic Valve  Aortic valve appears tricuspid. Thickened aortic valve leaflets noted. Aortic valve leaflets are Moderately calcified. Mild aortic stenosis is present. Tricuspid Valve  Mild tricuspid regurgitation visualized. Pulmonic Valve  The pulmonic valve was not well visualized . Trivial pulmonic regurgitation visualized. Left Atrium  Moderately dilated left atrium. Left Ventricle  Ejection fraction is visually estimated at 55%. Overall left ventricular function is normal.   Right Atrium  Right atrial size was normal.   Right Ventricle  The right ventricular size was normal with normal systolic function and  wall thickness. Pericardial Effusion  The pericardium was normal in appearance with no evidence of a pericardial  effusion. Pleural Effusion  No evidence of pleural effusion. Aorta / Great Vessels  -Aortic root dimension within normal limits.  -The Pulmonary artery is within normal limits. -IVC size is within normal limits with normal respiratory phasic changes.   M-Mode/2D Measurements & Calculations   LV Diastolic   LV Systolic Dimension:    AV Cusp Separation: 1.2 cmLA  Dimension: 4.2 3.1 cm                    Dimension: 4.2 cmAO Root  cm             LV Volume Diastolic: 54.0 Dimension: 2.4 cmLA Area: 18.3  LV FS:26.2 %   ml                        cm^2  LV PW          LV Volume Systolic: 76.2  Diastolic: 1.4 ml  cm             LV EDV/LV EDV modulation, iterative reconstruction, and/or weight-based dosing when appropriate to reduce radiation dose to as low as reasonably achievable. FINDINGS:  There are small bilateral pleural effusions with right-sided effusion increased in size compared to prior exam. There is adjacent atelectasis which has increased in the interval. The heart is prominently enlarged, similar to prior exam. There is a percutaneous gastrostomy tube in place, stable compared to prior exam. The unopacified liver is unremarkable. There are rim calcified stones within the gallbladder, similar to prior exam. Adrenal glands are unremarkable. There is a percutaneous nephrostomy tube on the left which has been repositioned at a more inferior aspect of the left kidney compared to prior exam. There are prominent dystrophic calcifications in the left kidney, similar to prior exam. There is adjacent scarring  in stranding. There is fat stranding adjacent to the proximal left ureter and renal pelvis, similar to prior exam. The right kidney is stable compared to prior exam. There are calcified granulomas in the spleen which is mildly prominent, unchanged compared to prior. The pancreas is within normal limits. No retroperitoneal or mesenteric lymphadenopathy is identified. Redemonstration of a colostomy, stable compared to prior exam. Small bowel appears within normal limits. The bladder is decompressed with Padilla catheter in place. The uterus and adnexa are unremarkable. No free fluid is identified. There is prominent subcutaneous edema in the abdominal wall, pelvic subcutis tissues and visualized upper extremities, increased compared to prior exam. There are stable degenerative changes of the spine. There is again noted to be a prominent sacral decubitus ulcer extending to the bone, similar to prior exam.      1. Small bilateral pleural effusions, increased on the right with increased adjacent atelectasis.  2. Redemonstration of left percutaneous nephrostomy tube which has been adjusted in position in the interval with stable dysmorphic left kidney with dystrophic calcifications. 3. Worsening anasarca. 4. Cholelithiasis. 5. Stable prominent sacral decubitus ulcer. **This report has been created using voice recognition software. It may contain minor errors which are inherent in voice recognition technology. ** Final report electronically signed by Dr. Debora Sampson MD on 7/17/2022 9:48 AM    XR INJ CONTRAST GASTRIC TUBE PERC    Result Date: 6/24/2022  PROCEDURE: XR INJ CONTRAST GASTRIC TUBE PERC CLINICAL INFORMATION: PEG replacement . COMPARISON: 1/14/2022. CT abdomen pelvis 4/12/2022. TECHNIQUE: Initially a supine KUB was obtained. A 2nd supine KUB was taken after injecting Gastrografin through the existing gastrostomy tube. FINDINGS: On  image, there is a gastrostomy tube. The tip is superimposed over the mid abdomen. There is a nephrostomy tube in the left kidney. There are no distended bowel loops. There is a tube superimposed over the pelvis. After injection of Gastrografin, contrast is seen in the stomach, duodenal bulb and a proximal small bowel loop. The tip is within the stomach. G-tube tip within the stomach. **This report has been created using voice recognition software. It may contain minor errors which are inherent in voice recognition technology. ** Final report electronically signed by Dr. Johnny Ennis on 6/24/2022 7:17 AM    FL MODIFIED BARIUM SWALLOW W VIDEO    Result Date: 7/19/2022  PROCEDURE: FL MODIFIED BARIUM SWALLOW W VIDEO CLINICAL INFORMATION: Dysphasia TECHNIQUE: Fluoroscopy was provided for modified barium swallowing study performed by speech therapy. With the patient in the lateral projection, swallowing mechanism was evaluated using barium of various consistencies. The radiologist was available for the entire examination. 13 video clips were obtained. Total fluoroscopy time 1 minutes 43 seconds.  Speech therapist: Nirmala Alicea COMPARISON: Modified barium swallow study 4/14/2022 FINDINGS: Oral, pharyngeal and esophageal structures appear normal. There is laryngeal penetration of thin barium. No aspiration is identified. 1. Laryngeal penetration of thin barium without evidence of aspiration. 2. Additional recommendations from the speech therapist will follow. **This report has been created using voice recognition software. It may contain minor errors which are inherent in voice recognition technology. ** Final report electronically signed by Dr. Milton Rivas on 7/19/2022 11:04 AM    IR GUIDED NEPHROSTOMY CATH EXCHANGE    Result Date: 7/13/2022  NEPHROSTOMY TUBE EXCHANGE: PERFORMED BY: Rafal Schmidt M.D. CLINICAL INFORMATION: Ureteral obstruction. Staghorn calculi. INDWELLING CATHETER: 8 Cypriot nephrostomy tube. NEW CATHETER: 8 Cypriot nephrostomy tube. FLUOROSCOPY TIME: 1.2 minutes FLUOROSCOPIC IMAGES: 5 ESTIMATED BLOOD LOSS: Minimal SEDATION: Versed 0.5 mg; Dilaudid 0.5 mg, IV; the patient was sedated during this procedure,  and monitored with EKG and pulse ox monitoring devices by a registered nurse. Face-to-face time with patient 10 minutes. PROCEDURE:  Signed informed consent was obtained prior to performing this procedure. Exposed portions of the nephrostomy tube and surrounding skin were prepped and draped in a sterile fashion. Iodinated contrast was injected and a nephrostogram was performed, revealing the nephrostomy tube to be in good position and functioning well. Fluoroscopic images were obtained for documentation. The indwelling nephrostomy tube was then removed over a guidewire and a new nephrostomy tube, as listed above, was advanced over the Glidewire with the tip coiled in the renal pelvis. . This was performed with fluoroscopic guidance. Fluoroscopic images were obtained for documentation. A Repeat nephrostogram was performed, confirming the catheter to be in good position and functioning well.  A small amount of antibiotic ointment was applied to the  wound site. The catheter was stabilized to the skin with a 2-0 silk suture and a split gauze dressing. .. 1. Status post successful nephrostomy tube exchange. 2. Patient will be tentatively scheduled for a repeat routine nephrostomy tube exchange in the next 3-4 months. **This report has been created using voice recognition software. It may contain minor errors which are inherent in voice recognition technology. ** Final report electronically signed by Dr. Sammi Yeager on 7/13/2022 9:06 AM        Physical Exam:  Vitals: BP (!) 120/56   Pulse 75   Temp 98.8 °F (37.1 °C) (Oral)   Resp 20   Ht 4' 9\" (1.448 m)   Wt 150 lb (68 kg)   SpO2 97%   PF (!) 20 L/min   BMI 32.46 kg/m²   24 hour intake/output:  Intake/Output Summary (Last 24 hours) at 7/20/2022 1507  Last data filed at 7/20/2022 1408  Gross per 24 hour   Intake 1266 ml   Output 545 ml   Net 721 ml     Last 3 weights: Wt Readings from Last 3 Encounters:   07/19/22 150 lb (68 kg)   06/24/22 140 lb (63.5 kg)   05/31/22 140 lb (63.5 kg)       General appearance - alert, awake appears to be in no acute distress  HEENT: Atraumatic normocephalic, no JVD. Trachea midline.   Chest - Bilateral air entry, no wheezes, crackles or rhonchi, right anterior chest hemodialysis catheter in place  Cardiovascular -irregularly irregular  Abdomen -on initial physical survey patient appears to have a PEG tube in place without leakage of feeds, also appears of colostomy bag in place left lower quadrant  neurological - non focal, no neurological deficits noted  Integumentary - Skin color, texture, turgor normal. No Rashes or lesions  Musculoskeletal -Full ROM, No clubbing or cyanosis  Extremities: Bilateral lower extremity occlusive dressings anterior tibia secondary to skin breakdown, posterior sacral decubital ulcers with areas of skin breakdown right posterior thorax area skin breakdown with small amount of pus Psychiatric, flat affect    DVT prophylaxis: [] Lovenox                                 [] SCDs                                 [] SQ Heparin                                 [] Encourage ambulation           [x] Already on Anticoagulation               Electronically signed by Kim Astudillo MD on 7/20/2022 at 3:07 PM    Rounding Hospitalist

## 2022-07-20 NOTE — FLOWSHEET NOTE
Pt was in bed at the time of the visit. She was very weak and could hardly talk. Her sister was there with her. She was anointed. 07/20/22 1600   Encounter Summary   Encounter Overview/Reason  Initial Encounter   Service Provided For: Patient and family together   Referral/Consult From: Family   Support System Family members   Last Encounter  07/20/22  (Anointed)   Complexity of Encounter Low   Spiritual/Emotional needs   Type Spiritual Support   Rituals, Rites and Sacraments   Type Anointing   Assessment/Intervention/Outcome   Assessment Calm; Hopeful

## 2022-07-20 NOTE — PROGRESS NOTES
Renal Progress Note  [facility]    Patient :  Shannan Silva; 73 y.o. MRN# [id number]  Location:  3B-32/032-A  Attending:  Alena Peralta MD  Admit Date:  7/11/2022   Hospital Day: 5      Subjective:     Nephrology is following the patient for ALVIN on CKD. Patient was seen and examined. She was sleeping and did not want to be woken. Per RN, she slept well and has been urinating well. The RN found out that she is on a MWF dialysis schedule but has not yet received dialysis during her hospital course. She is tolerating fluids well. Outpatient Medications:     Medications Prior to Admission: buPROPion (WELLBUTRIN) 75 MG tablet, Take 75 mg by mouth daily  traMADol (ULTRAM) 50 MG tablet, Take 50 mg by mouth 2 times daily as needed for Pain (moderate to severe pain). sertraline (ZOLOFT) 50 MG tablet, Take 25 mg by mouth daily   bumetanide (BUMEX) 1 MG tablet, Take 2 mg by mouth daily   metoprolol tartrate (LOPRESSOR) 25 MG tablet, Take 1 tablet by mouth 2 times daily  ipratropium-albuterol (DUONEB) 0.5-2.5 (3) MG/3ML SOLN nebulizer solution, Inhale 3 mLs into the lungs every 4 hours as needed for Shortness of Breath  sodium chloride 0.9 % SOLN, Inject as directed Flush TID nephrostomy tube 10ml. miconazole (MICOTIN) 2 % powder, Apply topically daily as needed for Itching Apply topically 2 times daily.   silver nitrate applicators 31-68 % applicator, Apply 1 each topically Once a week on Friday and PRN to prowed flesh at peg site  folic acid (FOLVITE) 1 MG tablet, Take 1 tablet by mouth daily  melatonin 3 MG TABS tablet, Take 2 tablets by mouth nightly as needed (anxiety, sleep)  [DISCONTINUED] ferrous sulfate (IRON 325) 325 (65 Fe) MG tablet, Take 1 tablet by mouth every other day (Patient not taking: Reported on 7/11/2022)  famotidine (PEPCID) 20 MG tablet, 20 mg by PEG Tube route daily  senna (SENOKOT) 8.8 MG/5ML SYRP syrup, Take 5 mLs by mouth nightly as needed (Patient not taking: Reported on 7/11/2022)  sodium hypochlorite (DAKINS) 0.125 % SOLN external solution, Apply Dakin's moistened gauze dressings to wound twice daily and as needed. [DISCONTINUED] FLUoxetine (PROZAC) 40 MG capsule, Take 1 capsule by mouth daily (Patient not taking: Reported on 7/11/2022)  Multiple Vitamins-Minerals (MULTIVITAMIN WOMEN PO), Take 1 tablet by mouth daily  acetaminophen (TYLENOL) 650 MG extended release tablet, Take 650 mg by mouth every 8 hours as needed for Pain  docusate sodium (COLACE) 100 MG capsule, Take 100 mg by mouth as needed for Constipation (Daily PRN)     Current Medications:     Scheduled Meds:    [START ON 7/22/2022] amiodarone  200 mg Oral Daily    amiodarone  200 mg Oral BID    sodium bicarbonate  1,300 mg Oral TID    morphine  1 mg IntraVENous Once    ondansetron  4 mg IntraVENous Once    midodrine  7.5 mg Oral TID WC    [Held by provider] bumetanide  1 mg Oral Daily    famotidine  20 mg PEG Tube Daily    folic acid  1 mg Oral Daily    metoprolol tartrate  25 mg Oral BID    sertraline  25 mg Oral Daily    sodium chloride flush  10 mL IntraCATHeter TID    sodium hypochlorite   Irrigation BID    sodium chloride flush  5-40 mL IntraVENous 2 times per day    heparin (porcine)  5,000 Units SubCUTAneous 3 times per day     Continuous Infusions:    dextrose      sodium chloride      sodium chloride       PRN Meds:  glucose, dextrose bolus **OR** dextrose bolus, glucagon (rDNA), dextrose, morphine, sodium chloride, acetaminophen, docusate sodium, ipratropium-albuterol, melatonin, sodium chloride flush, sodium chloride, ondansetron **OR** ondansetron, polyethylene glycol    Input/Output:       I/O last 3 completed shifts: In: 3696 [P.O.:120; I.V.:10; NG/GT:1319]  Out: 200 [Urine:630; Emesis/NG output:1; Stool:375].     Patient Vitals for the past 96 hrs (Last 3 readings):   Weight   07/19/22 0600 150 lb (68 kg)   07/17/22 0600 147 lb 12.8 oz (67 kg)       Vital Signs:   Temperature:  Temp: 98.8 °F (37.1 °C)  TMax:   Temp (24hrs), Av.1 °F (36.7 °C), Min:97.7 °F (36.5 °C), Max:98.8 °F (37.1 °C)    Respirations:  Resp: 20  Pulse:   Heart Rate: 75  BP:    BP: (!) 120/56  BP Range: Systolic (33KAX), RME:292 , Min:103 , WYC:148       Diastolic (55NQR), QEK:62, Min:54, Max:56      Physical Examination:     General:  Awake, alert, no acute distress  HEENT: NC/AT/ MMM  Chest:              Clear B/L no W/R/R  Cardiac:  S1 S2 appreciated  Abdomen: Soft, non-tender,  Neuro:              No facial droop, No Asterixis  SKIN:  No rashes, good skin turgor. Extremities:  No edema, no cyanosis    Labs:       Recent Labs     22  0440 22  0328 22  0348   WBC 22.0* 17.7* 15.9*   RBC 3.17* 3.30* 3.23*   HGB 8.2* 8.4* 8.4*   HCT 30.2* 31.3* 31.1*   MCV 95.3 94.8 96.3   MCH 25.9* 25.5* 26.0   MCHC 27.2* 26.8* 27.0*    371 312   MPV 8.5* 9.0* 8.7*      BMP:   Recent Labs     22  0440 22  0910 22  0348    138 142   K 3.9 3.6 4.0    108 109   CO2 17* 19* 20*   BUN 85* 82* 84*   CREATININE 3.3* 3.2* 3.7*   GLUCOSE 66* 106 100   CALCIUM 8.6 8.3* 8.0*      Phosphorus:   No results for input(s): PHOS in the last 72 hours. Magnesium:  No results for input(s): MG in the last 72 hours. Albumin:  No results for input(s): LABALBU in the last 72 hours.   BNP:      Lab Results   Component Value Date/Time    BNP 23 10/04/2017 10:37 AM     RYLAN:    No results found for: RYLAN  SPEP:  Lab Results   Component Value Date/Time    PROT 6.0 2022 05:38 AM     UPEP:   No results found for: LABPE  C3:     Lab Results   Component Value Date/Time    C3 93 10/25/2021 11:37 AM     C4:     Lab Results   Component Value Date/Time    C4 22 10/25/2021 11:37 AM     MPO ANCA:   No results found for: MPO  PR3 ANCA:   No results found for: PR3  Anti-GBM:   No results found for: GBMABIGG  Hep BsAg:         Lab Results   Component Value Date/Time    HEPBSAG Negative 2022 03:26 PM     Hep C AB: questions.   Electronically signed by Mare Alvarez DO on 7/20/2022 at 2:21 PM

## 2022-07-20 NOTE — CARE COORDINATION
7/20/22, 11:05 AM EDT    DISCHARGE ON GOING EVALUATION    Mary Pena day: 5  Location: 3B-32/032-A Reason for admit: Staghorn calculus [N20.0]   Procedure:   7/13:  Lt. Nephrostomy tube exchange  7/17 CT abdomen/pelvis: 1. Small bilateral pleural effusions, increased on the right with increased adjacent atelectasis. 2. Redemonstration of left percutaneous nephrostomy tube which has been adjusted in position in the interval with stable dysmorphic left kidney with dystrophic calcifications. 3. Worsening anasarca. 4. Cholelithiasis. 5. Stable prominent sacral decubitus ulcer. 7/18 MBS:1. Laryngeal penetration of thin barium without evidence of aspiration. 2. Additional recommendations from the speech therapist will follow. Barriers to Discharge: Hospitalist, nephro, ID following. ATB transitioned to PO. PT/TO. SLP eval: minced/moist with mildly thick liquids. Wound care. Plan to start eliquis today. PCP: Brissa Busby MD  Readmission Risk Score: 24.5%  Patient Goals/Plan/Treatment Preferences: Plans to return to the [place] [id number], no precert needed. Current with [facility].

## 2022-07-20 NOTE — TELEPHONE ENCOUNTER
Left neph tube exchange   Will need OV with Dr Clay April a couple wks prior to next scheduled IR nephrotomy tube exchange

## 2022-07-20 NOTE — CARE COORDINATION
7/20/22, 11:47 AM EDT    DISCHARGE PLANNING EVALUATION  Spoke with Celine at [facility] [place] [id number]. Made her aware patient is a potential discharge for tomorrow. They will be able to accept.

## 2022-07-21 VITALS
HEIGHT: 57 IN | SYSTOLIC BLOOD PRESSURE: 137 MMHG | HEART RATE: 76 BPM | WEIGHT: 150.1 LBS | TEMPERATURE: 97.9 F | BODY MASS INDEX: 32.38 KG/M2 | DIASTOLIC BLOOD PRESSURE: 63 MMHG | OXYGEN SATURATION: 90 % | RESPIRATION RATE: 16 BRPM

## 2022-07-21 LAB
ANION GAP SERPL CALCULATED.3IONS-SCNC: 12 MEQ/L (ref 8–16)
BUN BLDV-MCNC: 85 MG/DL (ref 7–22)
CALCIUM SERPL-MCNC: 8.1 MG/DL (ref 8.5–10.5)
CHLORIDE BLD-SCNC: 110 MEQ/L (ref 98–111)
CO2: 21 MEQ/L (ref 23–33)
CREAT SERPL-MCNC: 3.4 MG/DL (ref 0.4–1.2)
ERYTHROCYTE [DISTWIDTH] IN BLOOD BY AUTOMATED COUNT: 18.2 % (ref 11.5–14.5)
ERYTHROCYTE [DISTWIDTH] IN BLOOD BY AUTOMATED COUNT: 63 FL (ref 35–45)
GFR SERPL CREATININE-BSD FRML MDRD: 13 ML/MIN/1.73M2
GLUCOSE BLD-MCNC: 108 MG/DL (ref 70–108)
GLUCOSE BLD-MCNC: 110 MG/DL (ref 70–108)
GLUCOSE BLD-MCNC: 90 MG/DL (ref 70–108)
HCT VFR BLD CALC: 30.2 % (ref 37–47)
HEMOGLOBIN: 8.3 GM/DL (ref 12–16)
MCH RBC QN AUTO: 26.3 PG (ref 26–33)
MCHC RBC AUTO-ENTMCNC: 27.5 GM/DL (ref 32.2–35.5)
MCV RBC AUTO: 95.9 FL (ref 81–99)
PLATELET # BLD: 315 THOU/MM3 (ref 130–400)
PMV BLD AUTO: 8.8 FL (ref 9.4–12.4)
POTASSIUM SERPL-SCNC: 3.9 MEQ/L (ref 3.5–5.2)
RBC # BLD: 3.15 MILL/MM3 (ref 4.2–5.4)
SARS-COV-2, NAAT: NOT DETECTED
SODIUM BLD-SCNC: 143 MEQ/L (ref 135–145)
WBC # BLD: 17.9 THOU/MM3 (ref 4.8–10.8)

## 2022-07-21 PROCEDURE — 82948 REAGENT STRIP/BLOOD GLUCOSE: CPT

## 2022-07-21 PROCEDURE — 36415 COLL VENOUS BLD VENIPUNCTURE: CPT

## 2022-07-21 PROCEDURE — 6370000000 HC RX 637 (ALT 250 FOR IP): Performed by: INTERNAL MEDICINE

## 2022-07-21 PROCEDURE — 85027 COMPLETE CBC AUTOMATED: CPT

## 2022-07-21 PROCEDURE — 2700000000 HC OXYGEN THERAPY PER DAY

## 2022-07-21 PROCEDURE — 94760 N-INVAS EAR/PLS OXIMETRY 1: CPT

## 2022-07-21 PROCEDURE — 6370000000 HC RX 637 (ALT 250 FOR IP): Performed by: UROLOGY

## 2022-07-21 PROCEDURE — 99232 SBSQ HOSP IP/OBS MODERATE 35: CPT | Performed by: INTERNAL MEDICINE

## 2022-07-21 PROCEDURE — 6370000000 HC RX 637 (ALT 250 FOR IP)

## 2022-07-21 PROCEDURE — 6370000000 HC RX 637 (ALT 250 FOR IP): Performed by: NURSE PRACTITIONER

## 2022-07-21 PROCEDURE — 2580000003 HC RX 258: Performed by: UROLOGY

## 2022-07-21 PROCEDURE — 80048 BASIC METABOLIC PNL TOTAL CA: CPT

## 2022-07-21 PROCEDURE — 87635 SARS-COV-2 COVID-19 AMP PRB: CPT

## 2022-07-21 RX ORDER — AMIODARONE HYDROCHLORIDE 200 MG/1
200 TABLET ORAL DAILY
Qty: 30 TABLET | Refills: 1 | Status: SHIPPED | OUTPATIENT
Start: 2022-07-22

## 2022-07-21 RX ORDER — BUMETANIDE 1 MG/1
1 TABLET ORAL DAILY
Qty: 30 TABLET | Refills: 3 | DISCHARGE
Start: 2022-07-21

## 2022-07-21 RX ORDER — MIDODRINE HYDROCHLORIDE 2.5 MG/1
7.5 TABLET ORAL
Qty: 90 TABLET | Refills: 3 | DISCHARGE
Start: 2022-07-21

## 2022-07-21 RX ORDER — SODIUM BICARBONATE 650 MG/1
1300 TABLET ORAL 3 TIMES DAILY
Qty: 90 TABLET | Refills: 1 | Status: SHIPPED | OUTPATIENT
Start: 2022-07-21

## 2022-07-21 RX ADMIN — MIDODRINE HYDROCHLORIDE 7.5 MG: 2.5 TABLET ORAL at 15:18

## 2022-07-21 RX ADMIN — SODIUM CHLORIDE, PRESERVATIVE FREE 10 ML: 5 INJECTION INTRAVENOUS at 10:56

## 2022-07-21 RX ADMIN — FOLIC ACID 1 MG: 1 TABLET ORAL at 10:55

## 2022-07-21 RX ADMIN — SODIUM BICARBONATE 1300 MG: 650 TABLET ORAL at 10:55

## 2022-07-21 RX ADMIN — DAKIN'S SOLUTION 0.125% (QUARTER STRENGTH): 0.12 SOLUTION at 10:57

## 2022-07-21 RX ADMIN — AMIODARONE HYDROCHLORIDE 200 MG: 200 TABLET ORAL at 10:55

## 2022-07-21 RX ADMIN — SODIUM BICARBONATE 1300 MG: 650 TABLET ORAL at 15:18

## 2022-07-21 RX ADMIN — SERTRALINE 25 MG: 50 TABLET, FILM COATED ORAL at 10:56

## 2022-07-21 RX ADMIN — SODIUM CHLORIDE, PRESERVATIVE FREE 10 ML: 5 INJECTION INTRAVENOUS at 15:18

## 2022-07-21 RX ADMIN — SODIUM CHLORIDE, PRESERVATIVE FREE 10 ML: 5 INJECTION INTRAVENOUS at 10:57

## 2022-07-21 RX ADMIN — FAMOTIDINE 20 MG: 20 TABLET ORAL at 10:55

## 2022-07-21 RX ADMIN — APIXABAN 2.5 MG: 2.5 TABLET, FILM COATED ORAL at 10:56

## 2022-07-21 RX ADMIN — METOPROLOL TARTRATE 25 MG: 25 TABLET, FILM COATED ORAL at 10:55

## 2022-07-21 RX ADMIN — MIDODRINE HYDROCHLORIDE 7.5 MG: 2.5 TABLET ORAL at 10:55

## 2022-07-21 ASSESSMENT — PAIN SCALES - GENERAL: PAINLEVEL_OUTOF10: 0

## 2022-07-21 NOTE — PROGRESS NOTES
[facility]  OCCUPATIONAL THERAPY MISSED TREATMENT NOTE  [facility]B  3B-32/032-A      Date: 2022  Patient Name: Alba Nix        CSN: [id number]   : 1950  (73 y.o.)  Gender: female                REASON FOR MISSED TREATMENT: Patient Refused despite education and encouragement. OT signing off at this time d/t multiple refusals. Please re-order when pt is willing to participate.

## 2022-07-21 NOTE — PROGRESS NOTES
[facility] THERAPY MISSED TREATMENT NOTE  [facility] 3B      Date: 2022  Patient Name: Afia Bateman        MRN: [id number]    : 1950  (73 y.o.)    REASON FOR MISSED TREATMENT:   ST attempts to complete skilled dysphagia therapy with focus on pharyngeal strengthening exercises and advanced PO trials of thin liquids. Patient refusing. Rationale/education provided; however, patient continuing to refuse. Per chart review, OT signed off given multiple patient refusals. Will monitor need for further discharge versus appropriateness for skilled dysphagia intervention.      Devi Renee M.S. Madi Dubose 2022

## 2022-07-21 NOTE — PROGRESS NOTES
Progress note: Infectious diseases    Patient - Noemy Young,  Age - 73 y.o.    - 1950      Room Number - 3B-32/032-A   MRN -  [id number]   Acct # - [de-identified]  Date of Admission -  2022  8:49 AM    SUBJECTIVE:     No acute events overnight. Pt remains afebrile. Unable to rate pain. Leukocytosis still elevated, labile in trend. OBJECTIVE   VITALS    height is 4' 9\" (1.448 m) and weight is 150 lb 1.6 oz (68.1 kg). Her oral temperature is 97.7 °F (36.5 °C). Her blood pressure is 122/60 and her pulse is 76. Her respiration is 18 and oxygen saturation is 99%. Wt Readings from Last 3 Encounters:   22 150 lb 1.6 oz (68.1 kg)   22 140 lb (63.5 kg)   22 140 lb (63.5 kg)       I/O (24 Hours)    Intake/Output Summary (Last 24 hours) at 2022 0804  Last data filed at 2022 0229  Gross per 24 hour   Intake 1562 ml   Output 1415 ml   Net 147 ml       General Appearance  Awake, chronically sick looking  HEENT - normocephalic, atraumatic, pale conjunctiva,  anicteric sclera  Neck - Supple, no mass  Lungs -  Bilateral  air entry, no rhonchi, no wheeze  Cardiovascular - Heart sounds are normal.     Abdomen - soft, not distended, nontender, red area around the peg tube, leak noted around the peg tube left nephrostomy tube, seaman in place. Neurologic -awake chronically sick looking  Skin - No bruising or bleeding  Extremities - multiple wounds on extremites.     MEDICATIONS:      apixaban  2.5 mg Oral BID    [START ON 2022] amiodarone  200 mg Oral Daily    amiodarone  200 mg Oral BID    sodium bicarbonate  1,300 mg Oral TID    morphine  1 mg IntraVENous Once    ondansetron  4 mg IntraVENous Once    midodrine  7.5 mg Oral TID WC    [Held by provider] bumetanide  1 mg Oral Daily    famotidine  20 mg PEG Tube Daily    folic acid  1 mg Oral Daily    metoprolol tartrate  25 mg Oral BID sertraline  25 mg Oral Daily    sodium chloride flush  10 mL IntraCATHeter TID    sodium hypochlorite   Irrigation BID    sodium chloride flush  5-40 mL IntraVENous 2 times per day      dextrose      sodium chloride      sodium chloride       glucose, dextrose bolus **OR** dextrose bolus, glucagon (rDNA), dextrose, morphine, sodium chloride, acetaminophen, docusate sodium, ipratropium-albuterol, melatonin, sodium chloride flush, sodium chloride, ondansetron **OR** ondansetron, polyethylene glycol      LABS:     CBC:   Recent Labs     07/19/22  0328 07/20/22  0348 07/21/22  0332   WBC 17.7* 15.9* 17.9*   HGB 8.4* 8.4* 8.3*    312 315     BMP:    Recent Labs     07/19/22  0910 07/20/22  0348    142   K 3.6 4.0    109   CO2 19* 20*   BUN 82* 84*   CREATININE 3.2* 3.7*   GLUCOSE 106 100     Calcium:  Recent Labs     07/20/22 0348   CALCIUM 8.0*      Recent Labs     07/20/22  1233 07/20/22  1724 07/20/22  2000   POCGLU 126* 107 115*      CULTURES:   UA: No results for input(s): [place], COLORU, CLARITYU, MUCUS, PROTEINU, BLOODU, RBCUA, WBCUA, BACTERIA, NITRU, GLUCOSEU, BILIRUBINUR, UROBILINOGEN, KETUA, LABCAST, LABCASTTY, AMORPHOS in the last 72 hours. Invalid input(s): CRYSTALS  Micro:   Lab Results   Component Value Date/Time    BC No growth-preliminary No growth  07/11/2022 02:23 PM    BC No growth-preliminary No growth  07/11/2022 02:23 PM          Problem list of patient:     Patient Active Problem List   Diagnosis Code    Essential hypertension I10    Chronic depression F32. A    CHF (congestive heart failure) (HCC) I50.9    Thrombocytopenia (HCC) D69.6    Moderate episode of recurrent major depressive disorder (HCC) F33.1    Uremia N19    Hyperkalemia E87.5    Isolated non-nephrotic proteinuria R80.0    ALVIN (acute kidney injury) ([facility] [address] N17.9    Chronic right-sided heart failure (HCC) I50.812    Pulmonary HTN (HCC) I27.20    Hypotension due to hypovolemia I95.89, E86.1    Acute renal failure superimposed on stage 4 chronic kidney disease (HCC) N17.9, N18.4    Pressure injury of sacral region, stage 4 ([facility]) L89.154    Acute respiratory failure ([facility]) J96.00    Flash pulmonary edema ([facility]) J81.0    Shock (HCC) R57.9    Aspiration pneumonia of left lung (HCC) J69.0    Pleural effusion J90    Paroxysmal atrial fibrillation ([facility]) I48.0    Hemoptysis R04.2    Chronic respiratory failure with hypoxia ([facility]) J96.11    Pneumothorax, right J93.9    Collapse of left lung J98.11    Abnormal chest x-ray R93.89    Acute on chronic respiratory failure with hypoxia ([facility]) J96.21    Retroperitoneal hematoma K66.1    HAP (hospital-acquired pneumonia) J18.9, Y95    Colostomy in place ([facility] [address] Z93.3    PEG (percutaneous endoscopic gastrostomy) status ([facility] [address] Z93.1    Intertriginous dermatitis associated with moisture L30.4    Peristomal dermatitis associated with moisture L30.8    Nephrostomy tube displaced ([facility] [address] T83.022A    Open wound of second toe of right foot S91.104A    Open wound of second toe of left foot M63.418B    Metabolic acidosis T66.2    Irritation around percutaneous endoscopic gastrostomy (PEG) tube site ([facility]) K94.29    Staghorn calculus N20.0    Urinary tract infection associated with nephrostomy catheter ([facility]) T83.512A, N39.0    Hyponatremia E87.1    CKD (chronic kidney disease), stage IV ([facility]) N18.4       ASSESSMENT/PLAN   Sepsis 2/2 complicated UTI vs Decubitus wound infection  Multiple decubitus wound on the extremites - Stage IV wound   ALVIN on CKD Stage IV  Leak and irritation around the PEG tube. Atrial fibrillation  Deconditioning  Long term prognosis is guarded  Ok with discharge plan  NO IS issues.  Will sign off    Moni Conley MD, 7/21/2022 8:04 AM

## 2022-07-21 NOTE — DISCHARGE SUMMARY
Discharge Summary    Patient:  Max Rao  YOB: 1950    MRN: [id number]   Acct: [de-identified]    Primary Care Physician: Mayra Srivastava MD    Admit date:  7/11/2022    Discharge date:   7/21/22      Discharge Diagnoses:   Staghorn calculus  Principal Problem:    Staghorn calculus  Active Problems:    Urinary tract infection associated with nephrostomy catheter (HCC)    Hyponatremia    CKD (chronic kidney disease), stage IV ([address]  Resolved Problems:    * No resolved hospital problems.  *        Admitted for: (HPI) decubitus ulcerations stage III and IV chronic on presentation, and hypotension with catheter associated urinary tract infection    Hospital Course: Briefly this is a 14-year-old female that presented to urologic service for nephrostomy tube exchange patient has a staghorn calculi, she has had recurrent urinary tract infections, and appears to be chronically bedbound the patient required nephrostomy tube exchange on this hospitalization which was successfully performed the patient was started on IV Zosyn the patient for criteria for sepsis likely from catheter associated urinary tract infection and the patient also had transient hypotension that responded well to IV isotonic fluids patient also appeared to have dehydration on presentation, the patient was followed by nephrology she did require 1 unit of PRBCs to be transfused July 12, 2022 likely secondary to chronic anemia associated with chronic kidney disease stage IIIb or stage IV, she also had dysphagia for which she has had in prior hospitalizations and was followed by speech and dietary services, she was able to tolerate feeds through her feeding tube or PEG tube with rate adjustments intermittently, she has a colostomy bag in place that was changed without event, the patient has had leukocytosis throughout her hospitalization secondary to suspected sepsis from catheter associated urinary tract infection Zosyn was initiated and given a total of approximately 6 days, patient developed atrial fibrillation with RVR and required transient Cardizem drip and transition to amiodarone was followed by cardiology patient underwent successful echocardiogram, ultimately the patient was reevaluated with Mbs by speech and has continued dysphagia but was cleared for nectar type fluids, ultimately with the patient's family present and medical power of  the patient and the patient's family desire all interventions to be continued therefore patient is full code as of the time of this discharge patient will be discharged in good condition to skilled nursing facility. Consultants:  Patient Care Team:  Olga Swann MD as PCP - General (Family Medicine)    Discharge Medications:       Medication List        START taking these medications      amiodarone 200 MG tablet  Commonly known as: CORDARONE  Take 1 tablet by mouth in the morning. Start taking on: July 22, 2022     apixaban 2.5 MG Tabs tablet  Commonly known as: ELIQUIS  Take 1 tablet by mouth in the morning and 1 tablet before bedtime. midodrine 2.5 MG tablet  Commonly known as: PROAMATINE  Take 3 tablets by mouth in the morning and 3 tablets at noon and 3 tablets in the evening. Take with meals. sodium bicarbonate 650 MG tablet  Take 2 tablets by mouth in the morning and 2 tablets at noon and 2 tablets before bedtime. CHANGE how you take these medications      bumetanide 1 MG tablet  Commonly known as: BUMEX  Take 1 tablet by mouth in the morning.   What changed: how much to take            CONTINUE taking these medications      acetaminophen 650 MG extended release tablet  Commonly known as: TYLENOL     buPROPion 75 MG tablet  Commonly known as: WELLBUTRIN     docusate sodium 100 MG capsule  Commonly known as: COLACE     famotidine 20 MG tablet  Commonly known as: PEPCID     folic acid 1 MG tablet  Commonly known as: FOLVITE  Take 1 tablet by mouth daily ipratropium-albuterol 0.5-2.5 (3) MG/3ML Soln nebulizer solution  Commonly known as: DUONEB  Inhale 3 mLs into the lungs every 4 hours as needed for Shortness of Breath     melatonin 3 MG Tabs tablet  Take 2 tablets by mouth nightly as needed (anxiety, sleep)     metoprolol tartrate 25 MG tablet  Commonly known as: LOPRESSOR  Take 1 tablet by mouth 2 times daily     miconazole 2 % powder  Commonly known as: MICOTIN     MULTIVITAMIN WOMEN PO     senna 8.8 MG/5ML Syrp syrup  Commonly known as: SENOKOT     sertraline 50 MG tablet  Commonly known as: ZOLOFT     silver nitrate applicators 06-32 % applicator     sodium chloride 0.9 % Soln     sodium hypochlorite 0.125 % Soln external solution  Commonly known as: DAKINS  Apply Dakin's moistened gauze dressings to wound twice daily and as needed.      traMADol 50 MG tablet  Commonly known as: ULTRAM               Where to Get Your Medications        These medications were sent to [address] [place] [address] , [address]  [address] [place] [address], [facility] [address]      Phone: [phone]   amiodarone 200 MG tablet  apixaban 2.5 MG Tabs tablet  sodium bicarbonate 650 MG tablet       Information about where to get these medications is not yet available    Ask your nurse or doctor about these medications  bumetanide 1 MG tablet  midodrine 2.5 MG tablet           Physical Exam:    Vitals:  Vitals:    07/21/22 0939 07/21/22 1051 07/21/22 1214 07/21/22 1559   BP: (!) 127/58 (!) 122/57 135/64 137/63   Pulse: 80 78 71 76   Resp: 18 17 17 16   Temp: 98.3 °F (36.8 °C) 98.4 °F (36.9 °C) 98.3 °F (36.8 °C) 97.9 °F (36.6 °C)   TempSrc: Oral Oral Oral Oral   SpO2: 98% 99% 91% 90%   Weight:       Height:       PF:         Weight: Weight: 150 lb 1.6 oz (68.1 kg)     24 hour intake/output:  Intake/Output Summary (Last 24 hours) at 7/21/2022 1604  Last data filed at 7/21/2022 1509  Gross per 24 hour   Intake 864 ml   Output 2160 ml   Net -1296 ml       General appearance - alert, awake appears to be in no acute distress  HEENT: Atraumatic normocephalic, no JVD. Trachea midline. Chest - Bilateral air entry, no wheezes, crackles or rhonchi, right anterior chest hemodialysis catheter in place  Cardiovascular -irregularly irregular  Abdomen -on initial physical survey patient appears to have a PEG tube in place without leakage of feeds, also appears of colostomy bag in place left lower quadrant  neurological - non focal, no neurological deficits noted  Integumentary - Skin color, texture, turgor normal. No Rashes or lesions  Musculoskeletal -Full ROM, No clubbing or cyanosis  Extremities: Bilateral lower extremity occlusive dressings anterior tibia secondary to skin breakdown, posterior sacral decubital ulcers with areas of skin breakdown right posterior thorax area skin breakdown with small amount of pus  Please note prior to this hospitalization the patient had pre-existing decubitus ulcerations sacral area and posterior area followed by wound care. Procedures: Hemodialysis, PRBC transfusion 7/12/2022    Diagnostic Test:  na    Radiology reports as per the Radiologist  Radiology:  Echocardiogram 2D/ M-Mode/ Colorflow/ Do    Result Date: 7/18/2022  Transthoracic Echocardiography Report (TTE)  Demographics   Patient Name  Ishmael Fabry  Gender             Female                S   MR #          [id number]    Race                                             Ethnicity   Account #     [de-identified]    Room Number        3857   Accession     [id number]   Date of Study      07/18/2022  Number   Date of Birth 1950   Referring          Aristeo Sen MD                             Physician          Bina Payne MD   Age           73 year(s)   Sonographer        JEANNINE Maciel, RDCS,                                                RDMS, RVT                              Interpreting       Aristeo Sen MD Physician  Procedure Type of Study   TTE procedure:ECHOCARDIOGRAM COMPLETE 2D W DOPPLER W COLOR. Procedure Date Date: 07/18/2022 Start: 01:02 PM Study Location: Bedside Indications:Dyspnea / Shortness of breath. Additional Medical History:chronic kidney disease, urinary tract infection, hypertension, congestive heart failure, pulmonary hypertension, flash pulmonary edema, PEG tube, colostomy, osteoarthritis Patient Status: Routine Height: 57 inches Weight: 147 pounds BSA: 1.58 m^2 BMI: 31.81 kg/m^2 BP: 124/62 mmHg Allergies   - No Known Allergies.   - No Known Allergies. Conclusions   Summary  Ejection fraction is visually estimated at 55%. Overall left ventricular function is normal.  Moderately dilated left atrium. Aortic valve appears tricuspid. Thickened aortic valve leaflets noted. Aortic valve leaflets are Moderately calcified. Mild aortic stenosis is present. Signature   ----------------------------------------------------------------  Electronically signed by Halle Lynn MD (Interpreting  physician) on 07/18/2022 at 04:22 PM  ----------------------------------------------------------------   Findings   Mitral Valve  Myxomatotic degeneration of mitral valve. Calcification of the mitral valve noted. Mild mitral regurgitation is present. Aortic Valve  Aortic valve appears tricuspid. Thickened aortic valve leaflets noted. Aortic valve leaflets are Moderately calcified. Mild aortic stenosis is present. Tricuspid Valve  Mild tricuspid regurgitation visualized. Pulmonic Valve  The pulmonic valve was not well visualized . Trivial pulmonic regurgitation visualized. Left Atrium  Moderately dilated left atrium. Left Ventricle  Ejection fraction is visually estimated at 55%. Overall left ventricular function is normal.   Right Atrium  Right atrial size was normal.   Right Ventricle  The right ventricular size was normal with normal systolic function and  wall thickness. Pericardial Effusion  The pericardium was normal in appearance with no evidence of a pericardial  effusion. Pleural Effusion  No evidence of pleural effusion. Aorta / Great Vessels  -Aortic root dimension within normal limits.  -The Pulmonary artery is within normal limits. -IVC size is within normal limits with normal respiratory phasic changes.   M-Mode/2D Measurements & Calculations   LV Diastolic   LV Systolic Dimension:    AV Cusp Separation: 1.2 cmLA  Dimension: 4.2 3.1 cm                    Dimension: 4.2 cmAO Root  cm             LV Volume Diastolic: 24.1 Dimension: 2.4 cmLA Area: 18.3  LV FS:26.2 %   ml                        cm^2  LV PW          LV Volume Systolic: 02.2  Diastolic: 1.4 ml  cm             LV EDV/LV EDV Index: 78.6  Septum         ml/50 m^2LV ESV/LV ESV    RV Diastolic Dimension: 3.6 cm  Diastolic: 0.9 Index: 21.0 ml/24 m^2  cm             EF Calculated: 51.8 %     LA/Aorta: 1.75                                            LA volume/Index: 52 ml /33m^2                  LVOT: 1.9 cm  Doppler Measurements & Calculations   MV Peak E-Wave: 131  AV Peak Velocity: 234    LVOT Peak Velocity: 99.2  cm/s                 cm/s                     cm/s                       AV Peak Gradient: 21.9   LVOT Mean Velocity: 68.4  MV Peak Gradient:    mmHg                     cm/s  6.86 mmHg            AV Mean Velocity: 159    LVOT Peak Gradient: 4                       cm/s                     mmHgLVOT Mean Gradient: 2  MV Deceleration      AV Mean Gradient: 11     mmHg  Time: 135 msec       mmHg                       AV VTI: 41.1 cm          TV Peak E-Wave: 65.6 cm/s                       AV Area  MV E' Septal         (Continuity):1.39 cm^2   TV Peak Gradient: 1.72 mmHg  Velocity: 7.4 cm/s                            TR Velocity:300 cm/s  MV A' Septal         LVOT VTI: 20.2 cm        TR Gradient:36 mmHg  Velocity: 12.9 cm/s  IVRT: 58 msec            PV Peak Velocity: 89.7 cm/s  MV E' Lateral PV Peak Gradient: 3.22 mmHg  Velocity: 8.3 cm/s  MV A' Lateral        AV DVI (VTI): 0.49AV DVI  Velocity: 11.9 cm/s  (Vmax):0.42  E/E' septal: 17.7  E/E' lateral: 15.78  [email or web address] [id number]    CT ABDOMEN PELVIS WO CONTRAST Additional Contrast? None    Result Date: 7/17/2022  PROCEDURE: CT ABDOMEN PELVIS WO CONTRAST CLINICAL INFORMATION: abdominal pain . COMPARISON: CT abdomen pelvis dated 4/12/2022. TECHNIQUE: Axial 5 mm CT images were obtained through the abdomen and pelvis. No contrast was given. Coronal and sagittal reconstructions were created. All CT scans at this facility use dose modulation, iterative reconstruction, and/or weight-based dosing when appropriate to reduce radiation dose to as low as reasonably achievable. FINDINGS:  There are small bilateral pleural effusions with right-sided effusion increased in size compared to prior exam. There is adjacent atelectasis which has increased in the interval. The heart is prominently enlarged, similar to prior exam. There is a percutaneous gastrostomy tube in place, stable compared to prior exam. The unopacified liver is unremarkable. There are rim calcified stones within the gallbladder, similar to prior exam. Adrenal glands are unremarkable. There is a percutaneous nephrostomy tube on the left which has been repositioned at a more inferior aspect of the left kidney compared to prior exam. There are prominent dystrophic calcifications in the left kidney, similar to prior exam. There is adjacent scarring  in stranding. There is fat stranding adjacent to the proximal left ureter and renal pelvis, similar to prior exam. The right kidney is stable compared to prior exam. There are calcified granulomas in the spleen which is mildly prominent, unchanged compared to prior. The pancreas is within normal limits.  No retroperitoneal or mesenteric lymphadenopathy is identified. Redemonstration of a colostomy, stable compared to prior exam. Small bowel appears within normal limits. The bladder is decompressed with Padilla catheter in place. The uterus and adnexa are unremarkable. No free fluid is identified. There is prominent subcutaneous edema in the abdominal wall, pelvic subcutis tissues and visualized upper extremities, increased compared to prior exam. There are stable degenerative changes of the spine. There is again noted to be a prominent sacral decubitus ulcer extending to the bone, similar to prior exam.      1. Small bilateral pleural effusions, increased on the right with increased adjacent atelectasis. 2. Redemonstration of left percutaneous nephrostomy tube which has been adjusted in position in the interval with stable dysmorphic left kidney with dystrophic calcifications. 3. Worsening anasarca. 4. Cholelithiasis. 5. Stable prominent sacral decubitus ulcer. **This report has been created using voice recognition software. It may contain minor errors which are inherent in voice recognition technology. ** Final report electronically signed by Dr. Lucía Bolanos MD on 7/17/2022 9:48 AM    XR INJ CONTRAST GASTRIC TUBE PERC    Result Date: 6/24/2022  PROCEDURE: XR INJ CONTRAST GASTRIC TUBE PERC CLINICAL INFORMATION: PEG replacement . COMPARISON: 1/14/2022. CT abdomen pelvis 4/12/2022. TECHNIQUE: Initially a supine KUB was obtained. A 2nd supine KUB was taken after injecting Gastrografin through the existing gastrostomy tube. FINDINGS: On  image, there is a gastrostomy tube. The tip is superimposed over the mid abdomen. There is a nephrostomy tube in the left kidney. There are no distended bowel loops. There is a tube superimposed over the pelvis. After injection of Gastrografin, contrast is seen in the stomach, duodenal bulb and a proximal small bowel loop. The tip is within the stomach. G-tube tip within the stomach.  **This report has been created using voice recognition software. It may contain minor errors which are inherent in voice recognition technology. ** Final report electronically signed by Dr. Malcolm Esparza on 6/24/2022 7:17 AM    FL MODIFIED BARIUM SWALLOW W VIDEO    Result Date: 7/19/2022  PROCEDURE: FL MODIFIED BARIUM SWALLOW W VIDEO CLINICAL INFORMATION: Dysphasia TECHNIQUE: Fluoroscopy was provided for modified barium swallowing study performed by speech therapy. With the patient in the lateral projection, swallowing mechanism was evaluated using barium of various consistencies. The radiologist was available for the entire examination. 13 video clips were obtained. Total fluoroscopy time 1 minutes 43 seconds. Speech therapist: Radha Lee COMPARISON: Modified barium swallow study 4/14/2022 FINDINGS: Oral, pharyngeal and esophageal structures appear normal. There is laryngeal penetration of thin barium. No aspiration is identified. 1. Laryngeal penetration of thin barium without evidence of aspiration. 2. Additional recommendations from the speech therapist will follow. **This report has been created using voice recognition software. It may contain minor errors which are inherent in voice recognition technology. ** Final report electronically signed by Dr. Milton Rivas on 7/19/2022 11:04 AM    IR GUIDED NEPHROSTOMY CATH EXCHANGE    Result Date: 7/13/2022  NEPHROSTOMY TUBE EXCHANGE: PERFORMED BY: Rafal Schmidt M.D. CLINICAL INFORMATION: Ureteral obstruction. Staghorn calculi. INDWELLING CATHETER: 8 Malay nephrostomy tube. NEW CATHETER: 8 Malay nephrostomy tube. FLUOROSCOPY TIME: 1.2 minutes FLUOROSCOPIC IMAGES: 5 ESTIMATED BLOOD LOSS: Minimal SEDATION: Versed 0.5 mg; Dilaudid 0.5 mg, IV; the patient was sedated during this procedure,  and monitored with EKG and pulse ox monitoring devices by a registered nurse. Face-to-face time with patient 10 minutes.   PROCEDURE:  Signed informed consent was obtained prior to performing this procedure. Exposed portions of the nephrostomy tube and surrounding skin were prepped and draped in a sterile fashion. Iodinated contrast was injected and a nephrostogram was performed, revealing the nephrostomy tube to be in good position and functioning well. Fluoroscopic images were obtained for documentation. The indwelling nephrostomy tube was then removed over a guidewire and a new nephrostomy tube, as listed above, was advanced over the Glidewire with the tip coiled in the renal pelvis. . This was performed with fluoroscopic guidance. Fluoroscopic images were obtained for documentation. A Repeat nephrostogram was performed, confirming the catheter to be in good position and functioning well. A small amount of antibiotic ointment was applied to the  wound site. The catheter was stabilized to the skin with a 2-0 silk suture and a split gauze dressing. .. 1. Status post successful nephrostomy tube exchange. 2. Patient will be tentatively scheduled for a repeat routine nephrostomy tube exchange in the next 3-4 months. **This report has been created using voice recognition software. It may contain minor errors which are inherent in voice recognition technology. ** Final report electronically signed by Dr. Ilsa Velazco on 7/13/2022 9:06 AM      Results for orders placed or performed during the hospital encounter of 07/11/22   Culture, Blood 1    Specimen: Blood   Result Value Ref Range    Blood Culture, Routine No growth-preliminary No growth     Culture, Blood 2    Specimen: Blood   Result Value Ref Range    Blood Culture, Routine No growth-preliminary No growth     Culture, Urine    Specimen: Urine, catheter   Result Value Ref Range    Urine Culture, Routine No growth-preliminary No growth     Culture, Anaerobic and Aerobic    Specimen: G-Tube   Result Value Ref Range    Aerobic Culture (A)      Culture also yielded very light mixed growth of Enterococcus species, swarming Proteus species, and gram positive bacilli most consistent with Corynebacterium species. If a true mixed infection is suspected, then broad spectrum empiric antibiotic therapy is indicated. Anaerobic Culture       Culture overgrown by swarming Proteus species. No further evaluation of this culture is possible. If a true mixed aerobic and anaerobic infection is suspected, then broad spectrum empiric antibiotic therapy is indicated and should include coverage for anaerobic organisms. Gram Stain Result       Few segmented neutrophils observed. Few epithelial cells observed.  Many budding yeast.     Organism Candida albicans (A)     Aerobic Culture heavy growth      Organism Pseudomonas aeruginosa (A)     Aerobic Culture very light growth         Susceptibility    Pseudomonas aeruginosa - BACTERIAL SUSCEPTIBILITY PANEL BY [facility]AYLEY     cefepime <=1 Sensitive mcg/mL     piperacillin-tazobactam 8 Sensitive mcg/mL     gentamicin <=1 Sensitive mcg/mL     ciprofloxacin <=0.25 Sensitive mcg/mL     tobramycin <=1 Sensitive mcg/mL   COVID-19, Rapid    Specimen: Nasopharyngeal Swab   Result Value Ref Range    SARS-CoV-2, NAAT NOT DETECTED NOT DETECTED   CBC with Auto Differential   Result Value Ref Range    WBC 20.4 (H) 4.8 - 10.8 thou/mm3    RBC 2.68 (L) 4.20 - 5.40 mill/mm3    Hemoglobin 7.0 (LL) 12.0 - 16.0 gm/dl    Hematocrit 24.5 (L) 37.0 - 47.0 %    MCV 91.4 81.0 - 99.0 fL    MCH 26.1 26.0 - 33.0 pg    MCHC 28.6 (L) 32.2 - 35.5 gm/dl    RDW-CV 17.9 (H) 11.5 - 14.5 %    RDW-SD 58.8 (H) 35.0 - 45.0 fL    Platelets 175 124 - 378 thou/mm3    MPV 8.3 (L) 9.4 - 12.4 fL    Seg Neutrophils 88.7 %    Lymphocytes 3.4 %    Monocytes 6.1 %    Eosinophils 0.7 %    Basophils 0.2 %    Immature Granulocytes 0.9 %    Segs Absolute 18.1 (H) 1.8 - 7.7 thou/mm3    Lymphocytes Absolute 0.7 (L) 1.0 - 4.8 thou/mm3    Monocytes Absolute 1.2 0.4 - 1.3 thou/mm3    Eosinophils Absolute 0.1 0.0 - 0.4 thou/mm3    Basophils Absolute 0.0 0.0 - 0.1 thou/mm3 Immature Grans (Abs) 0.19 (H) 0.00 - 0.07 thou/mm3    nRBC 0 /100 wbc    BASOPHILIA 1+ Absent    Hypochromia PRESENT Absent   Basic Metabolic Panel   Result Value Ref Range    Sodium 132 (L) 135 - 145 meq/L    Potassium 4.0 3.5 - 5.2 meq/L    Chloride 96 (L) 98 - 111 meq/L    CO2 20 (L) 23 - 33 meq/L    Glucose 117 (H) 70 - 108 mg/dL     (H) 7 - 22 mg/dL    Creatinine 2.7 (H) 0.4 - 1.2 mg/dL    Calcium 8.7 8.5 - 10.5 mg/dL   Anion Gap   Result Value Ref Range    Anion Gap 16.0 8.0 - 16.0 meq/L   Glomerular Filtration Rate, Estimated   Result Value Ref Range    Est, Glom Filt Rate 17 (A) ml/min/1.73m2   Scan of Blood Smear   Result Value Ref Range    SCAN OF BLOOD SMEAR see below    Lactic Acid   Result Value Ref Range    Lactic Acid 2.4 (H) 0.5 - 2.0 mmol/L   Basic metabolic panel   Result Value Ref Range    Sodium 134 (L) 135 - 145 meq/L    Potassium 4.0 3.5 - 5.2 meq/L    Chloride 100 98 - 111 meq/L    CO2 19 (L) 23 - 33 meq/L    Glucose 79 70 - 108 mg/dL    BUN 99 (H) 7 - 22 mg/dL    Creatinine 2.7 (H) 0.4 - 1.2 mg/dL    Calcium 8.5 8.5 - 10.5 mg/dL   CBC with Auto Differential   Result Value Ref Range    WBC 17.6 (H) 4.8 - 10.8 thou/mm3    RBC 2.62 (L) 4.20 - 5.40 mill/mm3    Hemoglobin 6.7 (LL) 12.0 - 16.0 gm/dl    Hematocrit 23.9 (L) 37.0 - 47.0 %    MCV 91.2 81.0 - 99.0 fL    MCH 25.6 (L) 26.0 - 33.0 pg    MCHC 28.0 (L) 32.2 - 35.5 gm/dl    RDW-CV 18.0 (H) 11.5 - 14.5 %    RDW-SD 58.7 (H) 35.0 - 45.0 fL    Platelets 756 634 - 128 thou/mm3    MPV 8.6 (L) 9.4 - 12.4 fL    Seg Neutrophils 89.6 %    Lymphocytes 3.1 %    Monocytes 5.1 %    Eosinophils 0.7 %    Basophils 0.5 %    Immature Granulocytes 1.0 %    Segs Absolute 15.8 (H) 1.8 - 7.7 thou/mm3    Lymphocytes Absolute 0.5 (L) 1.0 - 4.8 thou/mm3    Monocytes Absolute 0.9 0.4 - 1.3 thou/mm3    Eosinophils Absolute 0.1 0.0 - 0.4 thou/mm3    Basophils Absolute 0.1 0.0 - 0.1 thou/mm3    Immature Grans (Abs) 0.17 (H) 0.00 - 0.07 thou/mm3    nRBC 0 /100 wbc    Hypochromia PRESENT Absent   Procalcitonin   Result Value Ref Range    Procalcitonin 2.02 (H) 0.01 - 0.09 ng/mL   Lactic Acid   Result Value Ref Range    Lactic Acid 0.8 0.5 - 2.0 mmol/L   Anion Gap   Result Value Ref Range    Anion Gap 15.0 8.0 - 16.0 meq/L   Glomerular Filtration Rate, Estimated   Result Value Ref Range    Est, Glom Filt Rate 17 (A) ml/min/1.73m2   Selected Antibody Screen   Result Value Ref Range    SELECTED GEL ANTIBODY SCREEN NEG, prev anti-D,C,E,Jkb    Hemoglobin and Hematocrit   Result Value Ref Range    Hemoglobin 6.6 (LL) 12.0 - 16.0 gm/dl    Hematocrit 23.1 (L) 37.0 - 47.0 %   Basic Metabolic Panel   Result Value Ref Range    Sodium 136 135 - 145 meq/L    Potassium 4.0 3.5 - 5.2 meq/L    Chloride 102 98 - 111 meq/L    CO2 16 (L) 23 - 33 meq/L    Glucose 77 70 - 108 mg/dL    BUN 92 (H) 7 - 22 mg/dL    Creatinine 3.0 (HH) 0.4 - 1.2 mg/dL    Calcium 8.4 (L) 8.5 - 10.5 mg/dL   CBC   Result Value Ref Range    WBC 20.9 (H) 4.8 - 10.8 thou/mm3    RBC 3.08 (L) 4.20 - 5.40 mill/mm3    Hemoglobin 8.1 (L) 12.0 - 16.0 gm/dl    Hematocrit 29.6 (L) 37.0 - 47.0 %    MCV 96.1 81.0 - 99.0 fL    MCH 26.3 26.0 - 33.0 pg    MCHC 27.4 (L) 32.2 - 35.5 gm/dl    RDW-CV 18.0 (H) 11.5 - 14.5 %    RDW-SD 63.5 (H) 35.0 - 45.0 fL    Platelets 498 460 - 281 thou/mm3    MPV 8.6 (L) 9.4 - 12.4 fL   Hemoglobin and Hematocrit   Result Value Ref Range    Hemoglobin 8.2 (L) 12.0 - 16.0 gm/dl    Hematocrit 28.0 (L) 37.0 - 47.0 %   Anion Gap   Result Value Ref Range    Anion Gap 18.0 (H) 8.0 - 16.0 meq/L   Glomerular Filtration Rate, Estimated   Result Value Ref Range    Est, Glom Filt Rate 15 (A) FQ/QGN/2.50R7   Basic Metabolic Panel   Result Value Ref Range    Sodium 138 135 - 145 meq/L    Potassium 4.0 3.5 - 5.2 meq/L    Chloride 106 98 - 111 meq/L    CO2 18 (L) 23 - 33 meq/L    Glucose 93 70 - 108 mg/dL    BUN 92 (H) 7 - 22 mg/dL    Creatinine 3.1 (HH) 0.4 - 1.2 mg/dL    Calcium 8.6 8.5 - 10.5 mg/dL   Anion Gap Result Value Ref Range    Anion Gap 14.0 8.0 - 16.0 meq/L   Glomerular Filtration Rate, Estimated   Result Value Ref Range    Est, Glom Filt Rate 15 (A) ml/min/1.73m2   CBC   Result Value Ref Range    WBC 19.3 (H) 4.8 - 10.8 thou/mm3    RBC 3.14 (L) 4.20 - 5.40 mill/mm3    Hemoglobin 8.2 (L) 12.0 - 16.0 gm/dl    Hematocrit 28.8 (L) 37.0 - 47.0 %    MCV 91.7 81.0 - 99.0 fL    MCH 26.1 26.0 - 33.0 pg    MCHC 28.5 (L) 32.2 - 35.5 gm/dl    RDW-CV 18.2 (H) 11.5 - 14.5 %    RDW-SD 60.8 (H) 35.0 - 45.0 fL    Platelets 186 341 - 754 thou/mm3    MPV 8.7 (L) 9.4 - 12.4 fL   Basic Metabolic Panel   Result Value Ref Range    Sodium 134 (L) 135 - 145 meq/L    Potassium 3.8 3.5 - 5.2 meq/L    Chloride 103 98 - 111 meq/L    CO2 17 (L) 23 - 33 meq/L    Glucose 159 (H) 70 - 108 mg/dL    BUN 87 (H) 7 - 22 mg/dL    Creatinine 3.0 (HH) 0.4 - 1.2 mg/dL    Calcium 8.3 (L) 8.5 - 10.5 mg/dL   CBC   Result Value Ref Range    WBC 14.1 (H) 4.8 - 10.8 thou/mm3    RBC 3.23 (L) 4.20 - 5.40 mill/mm3    Hemoglobin 8.2 (L) 12.0 - 16.0 gm/dl    Hematocrit 31.1 (L) 37.0 - 47.0 %    MCV 96.3 81.0 - 99.0 fL    MCH 25.4 (L) 26.0 - 33.0 pg    MCHC 26.4 (L) 32.2 - 35.5 gm/dl    RDW-CV 18.0 (H) 11.5 - 14.5 %    RDW-SD 62.6 (H) 35.0 - 45.0 fL    Platelets 930 670 - 667 thou/mm3    MPV 8.5 (L) 9.4 - 12.4 fL   Basic Metabolic Panel   Result Value Ref Range    Sodium 135 135 - 145 meq/L    Potassium 3.7 3.5 - 5.2 meq/L    Chloride 103 98 - 111 meq/L    CO2 17 (L) 23 - 33 meq/L    Glucose 199 (H) 70 - 108 mg/dL    BUN 89 (H) 7 - 22 mg/dL    Creatinine 3.3 (HH) 0.4 - 1.2 mg/dL    Calcium 8.7 8.5 - 10.5 mg/dL   Magnesium   Result Value Ref Range    Magnesium 1.6 1.6 - 2.4 mg/dL   Phosphorus   Result Value Ref Range    Phosphorus 5.8 (H) 2.4 - 4.7 mg/dL   Calcium, Ionized   Result Value Ref Range    Calcium, Ionized 1.25 1.12 - 1.32 mmol/L   TSH with Reflex   Result Value Ref Range    TSH 4.060 0.400 - 4.200 uIU/mL   Lactic Acid   Result Value Ref Range Lactic Acid 2.3 (H) 0.5 - 2.0 mmol/L   Procalcitonin   Result Value Ref Range    Procalcitonin 1.29 (H) 0.01 - 0.09 ng/mL   Anion Gap   Result Value Ref Range    Anion Gap 15.0 8.0 - 16.0 meq/L   Glomerular Filtration Rate, Estimated   Result Value Ref Range    Est, Glom Filt Rate 14 (A) ml/min/1.73m2   Blood Gas, Arterial   Result Value Ref Range    pH, Blood Gas 7.32 (L) 7.35 - 7.45    PCO2 34 (L) 35 - 45 mmhg    PO2 69 (L) 71 - 104 mmhg    HCO3 18 (L) 23 - 28 mmol/l    Base Excess (Calculated) -7.6 (L) -2.5 - 2.5 mmol/l    O2 Sat 92 %    DEVICE Room Air     David Test Positive     Source: L Radial     COLLECTED BY: [id number]    Anion Gap   Result Value Ref Range    Anion Gap 14.0 8.0 - 16.0 meq/L   Glomerular Filtration Rate, Estimated   Result Value Ref Range    Est, Glom Filt Rate 15 (A) ml/min/1.73m2   Microscopic Urinalysis   Result Value Ref Range    Glucose, Urine 100 (A) NEGATIVE mg/dl    Bilirubin Urine NEGATIVE NEGATIVE    Ketones, Urine NEGATIVE NEGATIVE    Specific Gravity, UA 1.014 1.002 - 1.030    Blood, Urine LARGE (A) NEGATIVE    pH, UA 5.5 5.0 - 9.0    Protein,  (A) NEGATIVE mg/dl    Urobilinogen, Urine 0.2 0.0 - 1.0 eu/dl    Nitrite, Urine NEGATIVE NEGATIVE    Leukocytes, UA SMALL (A) NEGATIVE    Color, UA YELLOW YELLOW-STRAW    Character, Urine CLEAR [id number].[id number]    RBC, UA > 200 0-2/hpf /hpf    WBC, UA 25-50 0-4/hpf /hpf    Epithelial Cells, UA 3-5 3-5/hpf /hpf    Bacteria, UA NONE SEEN FEW/NONE SEEN    Casts 4-8 HYALINE NONE SEEN /lpf    Crystals NONE SEEN NONE SEEN    Renal Epithelial, UA NONE SEEN NONE SEEN    Yeast, UA NONE SEEN NONE SEEN    Casts NONE SEEN /lpf    Miscellaneous Lab Test Result NONE SEEN    CBC   Result Value Ref Range    WBC 22.3 (H) 4.8 - 10.8 thou/mm3    RBC 3.13 (L) 4.20 - 5.40 mill/mm3    Hemoglobin 8.2 (L) 12.0 - 16.0 gm/dl    Hematocrit 30.0 (L) 37.0 - 47.0 %    MCV 95.8 81.0 - 99.0 fL    MCH 26.2 26.0 - 33.0 pg    MCHC 27.3 (L) 32.2 - 35.5 gm/dl    RDW-CV 18.1 (H) 11.5 - 14.5 %    RDW-SD 62.7 (H) 35.0 - 45.0 fL    Platelets 129 320 - 426 thou/mm3    MPV 8.3 (L) 9.4 - 12.4 fL   Basic Metabolic Panel   Result Value Ref Range    Sodium 136 135 - 145 meq/L    Potassium 3.9 3.5 - 5.2 meq/L    Chloride 107 98 - 111 meq/L    CO2 17 (L) 23 - 33 meq/L    Glucose 143 (H) 70 - 108 mg/dL    BUN 87 (H) 7 - 22 mg/dL    Creatinine 3.0 (HH) 0.4 - 1.2 mg/dL    Calcium 8.5 8.5 - 10.5 mg/dL   Iron   Result Value Ref Range    Iron 15 (L) 50 - 170 ug/dL   Iron binding capacity   Result Value Ref Range    TIBC 79 (L) 171 - 450 ug/dL   IRON SATURATION   Result Value Ref Range    Iron Saturation 19 (L) 20 - 50 %   Ferritin   Result Value Ref Range    Ferritin 1,092 (H) 10 - 291 ng/mL   Reticulocytes   Result Value Ref Range    Retic Ct Abs 3.1 (H) 0.5 - 2.0 %    Absolute Retic # 97.0 20.0 - 115.0 thou/mm3    Immature Retic Fract 27.9 (H) 3.0 - 15.9 %    Retic Hemoglobin 18.3 (L) 28.2 - 35.7 pg   Vitamin B12 & Folate   Result Value Ref Range    Vitamin B-12 1966 (H) 211 - 911 pg/mL    Folate > 20.0 4.8 - 24.2 ng/mL   Lactic Acid   Result Value Ref Range    Lactic Acid 1.1 0.5 - 2.0 mmol/L   Anion Gap   Result Value Ref Range    Anion Gap 12.0 8.0 - 16.0 meq/L   Glomerular Filtration Rate, Estimated   Result Value Ref Range    Est, Glom Filt Rate 15 (A) ml/min/1.73m2   CBC   Result Value Ref Range    WBC 18.2 (H) 4.8 - 10.8 thou/mm3    RBC 3.02 (L) 4.20 - 5.40 mill/mm3    Hemoglobin 7.9 (L) 12.0 - 16.0 gm/dl    Hematocrit 28.9 (L) 37.0 - 47.0 %    MCV 95.7 81.0 - 99.0 fL    MCH 26.2 26.0 - 33.0 pg    MCHC 27.3 (L) 32.2 - 35.5 gm/dl    RDW-CV 17.9 (H) 11.5 - 14.5 %    RDW-SD 63.0 (H) 35.0 - 45.0 fL    Platelets 206 150 - 685 thou/mm3    MPV 8.8 (L) 9.4 - 12.4 fL   Basic Metabolic Panel   Result Value Ref Range    Sodium 139 135 - 145 meq/L    Potassium 3.7 3.5 - 5.2 meq/L    Chloride 108 98 - 111 meq/L    CO2 17 (L) 23 - 33 meq/L    Glucose 103 70 - 108 mg/dL    BUN 82 (H) 7 - 22 mg/dL Creatinine 3.0 (HH) 0.4 - 1.2 mg/dL    Calcium 8.1 (L) 8.5 - 10.5 mg/dL   Anion Gap   Result Value Ref Range    Anion Gap 14.0 8.0 - 16.0 meq/L   Glomerular Filtration Rate, Estimated   Result Value Ref Range    Est, Glom Filt Rate 15 (A) ml/min/1.73m2   CBC   Result Value Ref Range    WBC 22.0 (H) 4.8 - 10.8 thou/mm3    RBC 3.17 (L) 4.20 - 5.40 mill/mm3    Hemoglobin 8.2 (L) 12.0 - 16.0 gm/dl    Hematocrit 30.2 (L) 37.0 - 47.0 %    MCV 95.3 81.0 - 99.0 fL    MCH 25.9 (L) 26.0 - 33.0 pg    MCHC 27.2 (L) 32.2 - 35.5 gm/dl    RDW-CV 17.9 (H) 11.5 - 14.5 %    RDW-SD 62.1 (H) 35.0 - 45.0 fL    Platelets 906 915 - 247 thou/mm3    MPV 8.5 (L) 9.4 - 12.4 fL   Basic Metabolic Panel   Result Value Ref Range    Sodium 140 135 - 145 meq/L    Potassium 3.9 3.5 - 5.2 meq/L    Chloride 108 98 - 111 meq/L    CO2 17 (L) 23 - 33 meq/L    Glucose 66 (L) 70 - 108 mg/dL    BUN 85 (H) 7 - 22 mg/dL    Creatinine 3.3 (HH) 0.4 - 1.2 mg/dL    Calcium 8.6 8.5 - 10.5 mg/dL   Anion Gap   Result Value Ref Range    Anion Gap 15.0 8.0 - 16.0 meq/L   Glomerular Filtration Rate, Estimated   Result Value Ref Range    Est, Glom Filt Rate 14 (A) ml/min/1.73m2   CBC   Result Value Ref Range    WBC 17.7 (H) 4.8 - 10.8 thou/mm3    RBC 3.30 (L) 4.20 - 5.40 mill/mm3    Hemoglobin 8.4 (L) 12.0 - 16.0 gm/dl    Hematocrit 31.3 (L) 37.0 - 47.0 %    MCV 94.8 81.0 - 99.0 fL    MCH 25.5 (L) 26.0 - 33.0 pg    MCHC 26.8 (L) 32.2 - 35.5 gm/dl    RDW-CV 18.0 (H) 11.5 - 14.5 %    RDW-SD 63.1 (H) 35.0 - 45.0 fL    Platelets 893 701 - 313 thou/mm3    MPV 9.0 (L) 9.4 - 12.4 fL   Scan of Blood Smear   Result Value Ref Range    SCAN OF BLOOD SMEAR see below    Basic Metabolic Panel   Result Value Ref Range    Sodium 138 135 - 145 meq/L    Potassium 3.6 3.5 - 5.2 meq/L    Chloride 108 98 - 111 meq/L    CO2 19 (L) 23 - 33 meq/L    Glucose 106 70 - 108 mg/dL    BUN 82 (H) 7 - 22 mg/dL    Creatinine 3.2 (HH) 0.4 - 1.2 mg/dL    Calcium 8.3 (L) 8.5 - 10.5 mg/dL   Anion Gap Result Value Ref Range    Anion Gap 11.0 8.0 - 16.0 meq/L   Glomerular Filtration Rate, Estimated   Result Value Ref Range    Est, Glom Filt Rate 14 (A) ml/min/1.73m2   CBC   Result Value Ref Range    WBC 15.9 (H) 4.8 - 10.8 thou/mm3    RBC 3.23 (L) 4.20 - 5.40 mill/mm3    Hemoglobin 8.4 (L) 12.0 - 16.0 gm/dl    Hematocrit 31.1 (L) 37.0 - 47.0 %    MCV 96.3 81.0 - 99.0 fL    MCH 26.0 26.0 - 33.0 pg    MCHC 27.0 (L) 32.2 - 35.5 gm/dl    RDW-CV 18.0 (H) 11.5 - 14.5 %    RDW-SD 63.8 (H) 35.0 - 45.0 fL    Platelets 582 559 - 847 thou/mm3    MPV 8.7 (L) 9.4 - 12.4 fL   Basic Metabolic Panel   Result Value Ref Range    Sodium 142 135 - 145 meq/L    Potassium 4.0 3.5 - 5.2 meq/L    Chloride 109 98 - 111 meq/L    CO2 20 (L) 23 - 33 meq/L    Glucose 100 70 - 108 mg/dL    BUN 84 (H) 7 - 22 mg/dL    Creatinine 3.7 (HH) 0.4 - 1.2 mg/dL    Calcium 8.0 (L) 8.5 - 10.5 mg/dL   Anion Gap   Result Value Ref Range    Anion Gap 13.0 8.0 - 16.0 meq/L   Glomerular Filtration Rate, Estimated   Result Value Ref Range    Est, Glom Filt Rate 12 (A) ml/min/1.73m2   CBC   Result Value Ref Range    WBC 17.9 (H) 4.8 - 10.8 thou/mm3    RBC 3.15 (L) 4.20 - 5.40 mill/mm3    Hemoglobin 8.3 (L) 12.0 - 16.0 gm/dl    Hematocrit 30.2 (L) 37.0 - 47.0 %    MCV 95.9 81.0 - 99.0 fL    MCH 26.3 26.0 - 33.0 pg    MCHC 27.5 (L) 32.2 - 35.5 gm/dl    RDW-CV 18.2 (H) 11.5 - 14.5 %    RDW-SD 63.0 (H) 35.0 - 45.0 fL    Platelets 991 438 - 850 thou/mm3    MPV 8.8 (L) 9.4 - 12.4 fL   Basic Metabolic Panel   Result Value Ref Range    Sodium 143 135 - 145 meq/L    Potassium 3.9 3.5 - 5.2 meq/L    Chloride 110 98 - 111 meq/L    CO2 21 (L) 23 - 33 meq/L    Glucose 110 (H) 70 - 108 mg/dL    BUN 85 (H) 7 - 22 mg/dL    Creatinine 3.4 (HH) 0.4 - 1.2 mg/dL    Calcium 8.1 (L) 8.5 - 10.5 mg/dL   Anion Gap   Result Value Ref Range    Anion Gap 12.0 8.0 - 16.0 meq/L   Glomerular Filtration Rate, Estimated   Result Value Ref Range    Est, Glom Filt Rate 13 (A) ml/min/1.73m2 POCT Glucose   Result Value Ref Range    POC Glucose 91 70 - 108 mg/dl   POCT glucose   Result Value Ref Range    POC Glucose 88 70 - 108 mg/dl   POCT glucose   Result Value Ref Range    POC Glucose 97 70 - 108 mg/dl   POCT glucose   Result Value Ref Range    POC Glucose 96 70 - 108 mg/dl   POCT glucose   Result Value Ref Range    POC Glucose 88 70 - 108 mg/dl   POCT glucose   Result Value Ref Range    POC Glucose 118 (H) 70 - 108 mg/dl   POCT glucose   Result Value Ref Range    POC Glucose 124 (H) 70 - 108 mg/dl   POCT glucose   Result Value Ref Range    POC Glucose 123 (H) 70 - 108 mg/dl   POCT glucose   Result Value Ref Range    POC Glucose 114 (H) 70 - 108 mg/dl   POCT glucose   Result Value Ref Range    POC Glucose 111 (H) 70 - 108 mg/dl   POCT glucose   Result Value Ref Range    POC Glucose 126 (H) 70 - 108 mg/dl   POCT glucose   Result Value Ref Range    POC Glucose 107 70 - 108 mg/dl   POCT glucose   Result Value Ref Range    POC Glucose 115 (H) 70 - 108 mg/dl   POCT glucose   Result Value Ref Range    POC Glucose 108 70 - 108 mg/dl   POCT glucose   Result Value Ref Range    POC Glucose 90 70 - 108 mg/dl   EKG 12 Lead   Result Value Ref Range    Ventricular Rate 118 BPM    Atrial Rate 118 BPM    P-R Interval 176 ms    QRS Duration 88 ms    Q-T Interval 312 ms    QTc Calculation (Bazett) 437 ms    P Axis 67 degrees    R Axis 69 degrees    T Axis 45 degrees   EKG 12 Lead   Result Value Ref Range    Ventricular Rate 128 BPM    Atrial Rate 112 BPM    P-R Interval 136 ms    QRS Duration 86 ms    Q-T Interval 320 ms    QTc Calculation (Bazett) 467 ms    P Axis 71 degrees    R Axis 55 degrees    T Axis 60 degrees   EKG 12 Lead   Result Value Ref Range    Ventricular Rate 135 BPM    Atrial Rate 147 BPM    QRS Duration 84 ms    Q-T Interval 330 ms    QTc Calculation (Bazett) 495 ms    R Axis 94 degrees    T Axis 64 degrees   EKG 12 Lead   Result Value Ref Range    Ventricular Rate 147 BPM    Atrial Rate 147 BPM P-R Interval 208 ms    QRS Duration 88 ms    Q-T Interval 340 ms    QTc Calculation (Bazett) 532 ms    P Axis 77 degrees    R Axis 51 degrees    T Axis 43 degrees   EKG 12 Lead   Result Value Ref Range    Ventricular Rate 100 BPM    Atrial Rate 100 BPM    P-R Interval 188 ms    QRS Duration 94 ms    Q-T Interval 346 ms    QTc Calculation (Bazett) 446 ms    P Axis 56 degrees    R Axis 20 degrees    T Axis 41 degrees   ABO/RH   Result Value Ref Range    ABO O     Rh Factor NEG        Diet:  ADULT DIET;  Dysphagia - Minced and Moist; Mildly Thick (Nectar)  ADULT TUBE FEEDING; PEG; Renal Formula; Continuous; 55; No; 150; Q 3 hours    Activity:  Activity as tolerated (Patient may move about with assist as indicated or with supervision.)    Follow-up:  in the next few days with Ese Powell MD,  in the next few days with unit physician in next several days    Disposition: SNF    Condition: Stable      Time Spent: 35 minutes    Electronically signed by Ba Gomez MD on 7/21/2022 at 4:04 PM    Discharging Hospitalist

## 2022-07-21 NOTE — PROGRESS NOTES
[facility]   Nephrology progress note  7/21/2022, 9:11 AM      Pt Name:    Clifford Ribeiro  MRN:     [id number]     YOB: 1950  Admit Date:    7/11/2022  8:49 AM    Chief Complaint: Nephrology following for  Acute kidney injury needing hemodialysis  .     Subjective:  Patient seen and examined  No chest pain or shortness of breath  Says she is overall feeling okay  Denies any pain anywhere  Urine output around 500 also having a lot of stool output    Objective:  24HR INTAKE/OUTPUT:      Intake/Output Summary (Last 24 hours) at 7/21/2022 0911  Last data filed at 7/21/2022 0229  Gross per 24 hour   Intake 1562 ml   Output 1415 ml   Net 147 ml        Admission weight: 141 lb 3.2 oz (64 kg)  Wt Readings from Last 3 Encounters:   07/21/22 150 lb 1.6 oz (68.1 kg)   06/24/22 140 lb (63.5 kg)   05/31/22 140 lb (63.5 kg)        Vitals :   Vitals:    07/20/22 1609 07/20/22 2034 07/20/22 2324 07/21/22 0229   BP:  129/64 (!) 140/63 122/60   Pulse:  71 70 76   Resp: 17 18 18 18   Temp:  98.4 °F (36.9 °C) 97.8 °F (36.6 °C) 97.7 °F (36.5 °C)   TempSrc:  Oral Oral Oral   SpO2:  97% 100% 99%   Weight:    150 lb 1.6 oz (68.1 kg)   Height:       PF:           Physical examination  General Appearance: Cachectic ill nourished no distress  Mouth/Throat:  Oral mucosa moist  Neck:  Supple, no JVD  Lungs:  Breath sounds: clear  Heart[de-identified]  S1,S2 heard  Abdomen:  Soft, non - tender  Musculoskeletal:  Edema -no significant edema noted    Medications:  Infusion:    dextrose      sodium chloride      sodium chloride       Meds:    apixaban  2.5 mg Oral BID    [START ON 7/22/2022] amiodarone  200 mg Oral Daily    amiodarone  200 mg Oral BID    sodium bicarbonate  1,300 mg Oral TID    morphine  1 mg IntraVENous Once    ondansetron  4 mg IntraVENous Once    midodrine  7.5 mg Oral TID WC    [Held by provider] bumetanide  1 mg Oral Daily    famotidine  20 mg PEG Tube Daily    folic acid  1 mg Oral Daily    metoprolol tartrate  25 mg Oral BID    sertraline  25 mg Oral Daily    sodium chloride flush  10 mL IntraCATHeter TID    sodium hypochlorite   Irrigation BID    sodium chloride flush  5-40 mL IntraVENous 2 times per day     Meds prn: glucose, dextrose bolus **OR** dextrose bolus, glucagon (rDNA), dextrose, morphine, sodium chloride, acetaminophen, docusate sodium, ipratropium-albuterol, melatonin, sodium chloride flush, sodium chloride, ondansetron **OR** ondansetron, polyethylene glycol     Lab Data :  CBC:   Recent Labs     07/19/22  0328 07/20/22  0348 07/21/22  0332   WBC 17.7* 15.9* 17.9*   HGB 8.4* 8.4* 8.3*   HCT 31.3* 31.1* 30.2*    312 315       CMP:  Recent Labs     07/19/22  0910 07/20/22  0348    142   K 3.6 4.0    109   CO2 19* 20*   BUN 82* 84*   CREATININE 3.2* 3.7*   GLUCOSE 106 100   CALCIUM 8.3* 8.0*       Hepatic: No results for input(s): LABALBU, AST, ALT, ALB, BILITOT, ALKPHOS in the last 72 hours. Assessment and Plan:  Renal -acute kidney injury needing hemodialysis  Based on the BMP her creatinine is 3.7 no new labs today awaiting for labs today  Based on that we will decide about dialysis today ideally would prefer her to be dialyzed before going to the nursing home today  BUN continues to rise urine output marginal     Electrolytes -mild hyponatremia secondary to renal dysfunction improved with IV fluids  Mild metabolic acidosis   Leukocytosis-status post antibiotics  Chronic bilateral obstruction due to staghorn calculi. Status post nephrostomy tube placement 7/13/2022  Hypotension status post IV fluids currently better. Currently on midodrine  Meds reviewed and discussed with patient       Todd Read MD  [facility]    This report has been created using voice recognition software.  It may contain minor errors which are inherent in voice recognition technology

## 2022-07-21 NOTE — CARE COORDINATION
7/21/22, 11:24 AM EDT    DISCHARGE ON GOING EVALUATION    Ema Olivas day: 6  Location: -32/032-A Reason for admit: Staghorn calculus [N20.0]   Procedure:   7/13:  Lt. Nephrostomy tube exchange  7/17 CT abdomen/pelvis: 1. Small bilateral pleural effusions, increased on the right with increased adjacent atelectasis. 2. Redemonstration of left percutaneous nephrostomy tube which has been adjusted in position in the interval with stable dysmorphic left kidney with dystrophic calcifications. 3. Worsening anasarca. 4. Cholelithiasis. 5. Stable prominent sacral decubitus ulcer. 7/18 MBS:1. Laryngeal penetration of thin barium without evidence of aspiration. 2. Additional recommendations from the speech therapist will follow. Barriers to Discharge: Hospitalist, Nephrology and ID following. PT/OT. SLP. Wound Care. Anticipate discharge back to SNF today if can secure transportation. BUN 85, creatinine 3.4. Per Nephrology, would like pt to have dialysis before returning to SNF today. PCP: Rhys Baldwin MD  Readmission Risk Score: 24.2%  Patient Goals/Plan/Treatment Preferences: Plans to return to the [facility], no precert needed. Current with [facility] on MW.

## 2022-07-21 NOTE — PLAN OF CARE
Problem: Safety - Adult  Goal: Free from fall injury  7/20/2022 2235 by Alex Wheat RN  Outcome: Progressing  Flowsheets (Taken 7/20/2022 2234)  Free From Fall Injury: Iftikhar Crutch family/caregiver on patient safety  7/20/2022 1426 by Lavinia Duron RN  Outcome: Progressing  Flowsheets (Taken 7/20/2022 0800)  Free From Fall Injury: Instruct family/caregiver on patient safety  Note: Bed locked & in low position, call light in reach, side-rails up x2, bed/chair alarm utilized, non-slip socks on when ambulating, reminded patient to use call light to call for assistance. Problem: ABCDS Injury Assessment  Goal: Absence of physical injury  7/20/2022 2235 by Alex Wheat RN  Outcome: Progressing  Flowsheets (Taken 7/20/2022 2234)  Absence of Physical Injury: Implement safety measures based on patient assessment  7/20/2022 1426 by Lavinia Duron RN  Outcome: Progressing  Flowsheets (Taken 7/20/2022 0800)  Absence of Physical Injury: Implement safety measures based on patient assessment     Problem: Skin/Tissue Integrity  Goal: Absence of new skin breakdown  Description: 1. Monitor for areas of redness and/or skin breakdown  2. Assess vascular access sites hourly  3. Every 4-6 hours minimum:  Change oxygen saturation probe site  4. Every 4-6 hours:  If on nasal continuous positive airway pressure, respiratory therapy assess nares and determine need for appliance change or resting period. 7/20/2022 2235 by Alex Wheat RN  Outcome: Progressing  7/20/2022 1426 by Lavinia Duron RN  Outcome: Progressing  Note: Ongoing assessment & interventions provided throughout shift. Skin assessments provided. Encouraging/assisting patient to turn as needed.        Problem: Nutrition Deficit:  Goal: Optimize nutritional status  7/20/2022 2235 by Alex Wheat RN  Outcome: Progressing  7/20/2022 1426 by Lavinia Duron RN  Outcome: Progressing  Flowsheets  Taken 7/20/2022 1426 by Lavinia Duron RN  Nutrient intake appropriate for improving, restoring, or maintaining nutritional needs:   Assess nutritional status and recommend course of action   Monitor oral intake, labs, and treatment plans  Taken 7/20/2022 1212 by Stepan Bowen RD  Nutrient intake appropriate for improving, restoring, or maintaining nutritional needs:   Assess nutritional status and recommend course of action   Monitor oral intake, labs, and treatment plans   Recommend appropriate diets, oral nutritional supplements, and vitamin/mineral supplements   Order, calculate, and assess calorie counts as needed

## 2022-07-21 NOTE — PROGRESS NOTES
Discussed discharge summary with patient. Instructed patient about medications & follow up appointments. Patient denies any additional questions. Patient was discharged with all belongings. No distress noted. Patient discharged to the [place] [address] via [address] [facility]. Report called to [address] [place] and ANTONIO/ Rylan Romero faxed to facility.

## 2022-07-21 NOTE — CARE COORDINATION
7/21/22, 1:00 PM EDT    Patient goals/plan/ treatment preferences discussed by  and . Patient goals/plan/ treatment preferences reviewed with patient/ family. Patient/ family verbalize understanding of discharge plan and are in agreement with goal/plan/treatment preferences. Understanding was demonstrated using the teach back method. AVS provided by RN at time of discharge, which includes all necessary medical information pertaining to the patients current course of illness, treatment, post-discharge goals of care, and treatment preferences. Services At/After Discharge: Home Health, Aide services, In ambulance, Nursing service, OT, and PT       IMM Letter  IMM Letter given to Patient/Family/Significant other/Guardian/POA/by[de-identified] Christine Bowers RN CM  IMM Letter date given[de-identified] 07/21/22  IMM Letter time given[de-identified] 1128  Observation Status Letter date given[de-identified] 07/11/22  Observation Status Letter time given[de-identified] 1982  Observation Status Letter given to Patient/Family/Significant other/Guardian/POA/by[de-identified] staff    Duncan Peres will be discharged today. She will be skilled at the facility under her Medicare benefit. She will be transported by ambulance. Discharge instructions and transport forms are attached to blue discharge packet. Spoke with Celine at the facility and they are able to accept at discharge. Transport is set up for 5:30pm.  Felix Shahid RN, patient and family are all agreeable to discharge plan.

## 2022-07-22 LAB
ORGANISM: ABNORMAL
URINE CULTURE, ROUTINE: ABNORMAL

## 2022-10-12 NOTE — CARE COORDINATION
[facility] Transitions Follow Up Call    2019    Patient: Maria Teresa Porter  Patient : 1950   MRN: [id number]  Reason for Admission: Hypotension due to hypovolemia   Discharge Date: 19 RARS: Readmission Risk Score: 16         Spoke with: Maria Teresa Porter  Patient stated she is \"all good\" Denies SOB, chest pressure, edema, and cough. Continues not to use salt. Sister checks her BP. Reviewed appts. No new medications. Denies concerns or issues at this time. Care Transitions Subsequent and Final Call    Subsequent and Final Calls  Do you have any ongoing symptoms?:  No  Have your medications changed?:  No  Do you have any questions related to your medications?:  No  Do you currently have any active services?:  No  Do you have any needs or concerns that I can assist you with?:  No  Identified Barriers:  None  Care Transitions Interventions  No Identified Needs  Other Interventions:             Follow Up  Future Appointments   Date Time Provider Shira Vandana   2019  9:00 AM Radha Alexander MD [facility] [place]   2019  3:15 PM Oziel Neville MD [facility] [place]   10/1/2019  8:30 AM Sally Feldman PA-C [facility] [place]   10/22/2019  2:20 PM Denise Herzog MD [facility] [place]   10/23/2019 11:00 AM LANE Cui - CNP [facility] - [place]   2019 12:00  [address], MD [facility] - Mackenzie Gomez RN
Detail Level: Zone

## 2023-03-04 NOTE — TELEPHONE ENCOUNTER
----- Message from [facility] CNP sent at 2/28/2022  7:43 AM EST -----  Cx from nephrostomy tube showed infection. See if nursing home is treating or if she is on antibiotics.  If not, ill start doxy
Cristal stated Cipro ended 02/22/2022. She was on it 250 mg every other day. If a prescription is sent can it be faxed to [phone] deshaun Bee.
Doxy script printed
Prescription faxed to the [facility].
giovanad

## 2023-11-14 NOTE — ED NOTES
2023    From the office of:   Nicholas Acuna MD   [facility]  [address] [place] [address] DR. [address] [facility] [address]  Phone: [phone]  Fax: [phone]    In regards to Duglas Berkowitz, :  1929      Post Acute Skilled Nursing Home Initial Visit Note     Date of Service: 2023  Location seen at: [facility]  Subacute / Skilled Need: Rehabilitation    PCP: Tom Mujica MD   Patient Care Team:  Tom Mujica MD as PCP - General (Legal Medicine)  Nicholas Acuna MD as Post Acute Facility Provider: Physician (Family Practice)  Najma eFrrera CNP as Post Acute Facility Provider: APC (Nurse Practitioner - Adult Health)    History of Present Illness:  Duglas Berkowitz is a 89 year old male presenting to Post Acute Skilled Nursing for: therapy.   PMH: A fib, HTN, HL  Presented w/ L sided weakness and aphasia 2/2 R M1 occlusion s/p TICI likely from A Fib, not on AC d/t hx GIB in past.  CTH acute ischemia within the right basal ganglia, right parietal, right frontal and  right occipital lobes.  Family declined AC and pt was started on Asa/statin.  Needs outpatient CT for further eval of possible L atrial appendage closure.  Hospital stay c/b agitation and pt was started on Abilify and Depakote.    Attempted to use  line and pt told me he wanted to be left alone.    Granddaughter, Latosha, NP at side. Asked if prostate meds can be given at hs. Family found that at home, it was causing some agitation and he did better at home. Was having sun downing issues at home PTA.D/w her meds that were started and does not prefer he stay on the Abilify. D/w her sleep wake cycle and agreed to Trazodone. Pt on Melatonin, will increase to 5 mg.She agreed.Will further d/w Dr Acuna and if ok, will d\c Abilify. D/w her Gabapentin for some anxiety and prefers that over Depakote. States pt has some chronic pain issues and feels that is a better fit. Advised that Dr Acuna  Patient resting in bed. Respirations easy and unlabored. No distress noted. Call light within reach.   Patient taken to STACEY Lynn RN  03/13/22 1800 will f/u tomorrow and she agreed. Plan is to return home w/ his wife and they have a neighbor who is a caregiver, but granddaughter feels they warren need more help in the home.States her dad who is POA is meeting w/ palliative care this afternoon. D/w her given his insurance, he likely needs to be followed by Adv Palliative and we can arrange at discharge, along w/ any DME needed and HHS. D/w her ACP and states the family had decided Full Code and to consider quality of life if anything were to happen.   All questions and concerns were addressed.    HISTORY  No past medical history on file.     No past surgical history on file.  No family history on file.  History       Not marked as reviewed during this visit.            PROBLEM LIST:  Patient Active Problem List   Diagnosis   • Acute ischemic right middle cerebral artery (MCA) stroke (CMD)   • Delirium   • Agitation   • Paroxysmal atrial fibrillation (CMD)   • Benign hypertension with chronic kidney disease, stage II   • Dysphagia due to recent stroke   • Debility       ADVANCE CARE PLANNING:  Advance Care Planning    Location: [facility]   Advance care planning documents on file - yes    Pt is Full Code        FRAILTY SCREENING:       ADVANCED ILLNESS SCREENING:       DEPRESSION SCREENING:  Recent PHQ 2/9 Score    PHQ 2:  PHQ 2 Score Adult PHQ 2 Score Adult PHQ 2 Interpretation Little interest or pleasure in activity?   5/3/2023   2:12 PM 0 No further screening needed 0       PHQ 9:       DEPRESSION ASSESSMENT/PLAN:  Unable to assess 2/2 cognition    ALLERGIES:  Allergies as of 11/14/2023   • (Not on File)       CURRENT MEDICATIONS:   Current Outpatient Medications   Medication Sig Dispense Refill   • losartan (COZAAR) 25 MG tablet Take 25 mg by mouth daily.     • melatonin 5 MG Take 1 tablet by mouth nightly.     • traZODone (DESYREL) 50 MG tablet Take 25 mg by mouth nightly.     • divalproex (DEPAKOTE) 250 MG delayed release EC tablet Take 250 mg by  mouth 2 (two) times a day.     • famotidine (PEPCID) 20 MG tablet Take 20 mg by mouth daily.     • Heparin Sodium, Porcine, (heparin, porcine,) 5000 UNIT/ML injectable solution Inject 5,000 Units into the skin every 12 hours.     • amLODIPine (NORVASC) 5 MG tablet Take 5 mg by mouth daily. Indications: High Blood Pressure Disorder     • atorvastatin (LIPITOR) 80 MG tablet Take 80 mg by mouth daily. Indications: High Amount of Fats in the Blood, High Amount of Triglycerides in the Blood     • aspirin 81 MG chewable tablet Chew 81 mg by mouth daily. Indications: Stroke Due To Limited Blood Flow     • tamsulosin (FLOMAX) 0.4 MG Cap Take 1 capsule by mouth nightly. Indications: Benign Enlargement of Prostate     • finasteride (PROSCAR) 5 MG tablet Take 5 mg by mouth nightly. Indications: Benign Enlargement of Prostate       No current facility-administered medications for this visit.     Medications reviewed / reconciled: Yes    BASELINE FUNCTIONAL STATUS:  Independent    CURRENT FUNCTIONAL STATUS:  Therapy eval today    DIET:  Consistency: Pureed  Type: cardiac diet  Appetite: Fair    REVIEW OF SYSTEMS:  Review of Systems   Unable to perform ROS: Dementia   Endocrine: Negative for cold intolerance.       VITALS:  Visit Vitals  /57   Pulse 65   Temp 97.4 °F (36.3 °C)       PAIN SCORE:  Vitals:    11/14/23 0641   PainSc:  0       PHYSICAL ASSESSMENT:  Physical Exam  Vitals and nursing note reviewed.   Constitutional:       General: He is not in acute distress.     Appearance: Normal appearance. He is not ill-appearing.   HENT:      Head: Normocephalic and atraumatic.      Right Ear: External ear normal.      Left Ear: External ear normal.      Mouth/Throat:      Mouth: Mucous membranes are moist.      Neck: Normal range of motion.   Eyes:      General:         Right eye: No discharge.         Left eye: No discharge.   Cardiovascular:      Rate and Rhythm: Normal rate and regular rhythm.      Heart sounds: Normal  heart sounds.   Pulmonary:      Effort: Pulmonary effort is normal. No respiratory distress.      Breath sounds: Normal breath sounds.   Abdominal:      Palpations: Abdomen is soft.   Musculoskeletal:      Right lower leg: Edema present.      Left lower leg: Edema present.      Comments: JONNY   Neurological:      Mental Status: He is alert.      Comments: Oriented to self   Psychiatric:         Behavior: Behavior normal.         LABS:  Pending    ASSESSMENT AND PLAN    Acute R multiple ischemic infarcts: right basal ganglia, right parietal, right frontal and  right occipital lobes.  R M1 occlusion s/p thrombectomy  -likely source A fib vs atheroembolic, family had declined AC 2/2 hx GIB  -cont Asa  -needs outpatient cardia CT to eval for possible L atrial appendage    Agitation/delirium  -per granddaughter pt was having memory and sun downing issues PTA  -add Trazodone 25 mg at bedtime, increase Melatonin 5 mg at bedtime  -switch timing of Flomax and Proscar to at bedtime per granddaughter request  -stop Abilify, granddaughter in agreement  -cont Depakote for now, started inpatient  -Roman psych APN on consult  -follow  -POC d/w Dr Acuna and agrees    PAF  -family declined AC  -rate controlled  -cont Asa  -f/u w/ CV    HTN w/ CKD 2  -cont Losartan, Amlodipine  -CV diet  -follow    Dysphagia  -ST on consult  -cont modified diet  -aspiration precautions    Debility  -PT/OT  -fall precautions    DVT/GI ppx: start SQH 5000 units q 12, Pepcid 20 mg qhs    DISCHARGE PLANNING:   The patient was seen for an initial visit     Discussed with: RN / Nursing and Family  Prognosis: fair:  Disposition:Stable / Unchanged  Patient is safe to transition to next level of care? No  Meds reviewed, reconciled  Attending MD aware and agrees w/ POC    PDPM reviewed No    Total time spent is 60 minutes.  Time spent in:  - Discussion of plan of care with the patient/family/staff regarding:     Prognosis, differential diagnosis, risks, and benefits  of treatment(s), instructions,     compliance, and risk reduction  - Preparing to see the patient (eg, review of tests)  - Obtaining and/or reviewing separately obtained history  - Performing a medically appropriate examination and/or evaluation  - Counseling and educating the patient/family/caregiver  - Ordering medications, tests, or procedures  - Documenting clinical information in the electronic or other health record  - Independently interpreting results (not separately reported) and communicating results to the    patient/family/caregiver  - Care coordination (not separately reported)    Electronically signed by: Nicholas Acuna MD  11/14/2023      Post Acute Skilled Nursing Home Initial Visit Note     Date of Service: 11/14/2023  Location seen at: [facility]  Subacute / Skilled Need: Rehabilitation    PCP: Tom Mujica MD   Patient Care Team:  Tom Mujica MD as PCP - General (Legal Medicine)  Nicholas Acuna MD as Post Acute Facility Provider: Physician (Family Practice)  Najma Ferrera CNP as Post Acute Facility Provider: APC (Nurse Practitioner - Adult Health)    History of Present Illness:  Duglas Berkowitz is a 89 year old male presenting to Post Acute Skilled Nursing for: therapy.   PMH: A fib, HTN, HL  Presented w/ L sided weakness and aphasia 2/2 R M1 occlusion s/p TICI likely from A Fib, not on AC d/t hx GIB in past.  CTH acute ischemia within the right basal ganglia, right parietal, right frontal and  right occipital lobes.  Family declined AC and pt was started on Asa/statin.  Needs outpatient CT for further eval of possible L atrial appendage closure.  Hospital stay c/b agitation and pt was started on Abilify and Depakote.    Attempted to use  line and pt told me he wanted to be left alone.    Granddaughter, Latosha, NP at side. Asked if prostate meds can be given at hs. Family found that at home, it was causing some agitation and he did better at home. Was  having sun downing issues at home PTA.D/w her meds that were started and does not prefer he stay on the Abilify. D/w her sleep wake cycle and agreed to Trazodone. Pt on Melatonin, will increase to 5 mg.She agreed.Will further d/w Dr Acuna and if ok, will d\c Abilify. D/w her Gabapentin for some anxiety and prefers that over Depakote. States pt has some chronic pain issues and feels that is a better fit. Advised that Dr Acuna will f/u tomorrow and she agreed. Plan is to return home w/ his wife and they have a neighbor who is a caregiver, but granddaughter feels they warren need more help in the home.States her dad who is POA is meeting w/ palliative care this afternoon. D/w her given his insurance, he likely needs to be followed by Adv Palliative and we can arrange at discharge, along w/ any DME needed and HHS. D/w her ACP and states the family had decided Full Code and to consider quality of life if anything were to happen.   All questions and concerns were addressed.    11/14:  Pt seen for initial physician H and P.  Notes reviewed above along with hospital records, labs, and imaging.  Seen with  on phone.  Limited history  with some challenges undestanding patient.  Denies f/cv/nd.  No new symptoms.      CVA - noted left sided weakenss with therapy and nursing.  Max for transfers and adls.      HTN - controlled today on meds below.      HISTORY  No past medical history on file.     No past surgical history on file.  No family history on file.  History       Not marked as reviewed during this visit.            PROBLEM LIST:  Patient Active Problem List   Diagnosis   • Acute ischemic right middle cerebral artery (MCA) stroke (CMD)   • Delirium   • Agitation   • Paroxysmal atrial fibrillation (CMD)   • Benign hypertension with chronic kidney disease, stage II   • Dysphagia due to recent stroke   • Debility       ADVANCE CARE PLANNING:  Advance Care Planning    Location: [facility]  care planning documents on file - yes    Pt is Full Code      FRAILTY SCREENING:       ADVANCED ILLNESS SCREENING:       DEPRESSION SCREENING:  Recent PHQ 2/9 Score    PHQ 2:  PHQ 2 Score Adult PHQ 2 Score Adult PHQ 2 Interpretation Little interest or pleasure in activity?   5/3/2023   2:12 PM 0 No further screening needed 0       PHQ 9:       DEPRESSION ASSESSMENT/PLAN:  Unable to assess 2/2 cognition    ALLERGIES:  Allergies as of 11/14/2023   • (Not on File)       CURRENT MEDICATIONS:   Current Outpatient Medications   Medication Sig Dispense Refill   • losartan (COZAAR) 25 MG tablet Take 25 mg by mouth daily.     • melatonin 5 MG Take 1 tablet by mouth nightly.     • traZODone (DESYREL) 50 MG tablet Take 25 mg by mouth nightly.     • divalproex (DEPAKOTE) 250 MG delayed release EC tablet Take 250 mg by mouth 2 (two) times a day.     • famotidine (PEPCID) 20 MG tablet Take 20 mg by mouth daily.     • Heparin Sodium, Porcine, (heparin, porcine,) 5000 UNIT/ML injectable solution Inject 5,000 Units into the skin every 12 hours.     • amLODIPine (NORVASC) 5 MG tablet Take 5 mg by mouth daily. Indications: High Blood Pressure Disorder     • atorvastatin (LIPITOR) 80 MG tablet Take 80 mg by mouth daily. Indications: High Amount of Fats in the Blood, High Amount of Triglycerides in the Blood     • aspirin 81 MG chewable tablet Chew 81 mg by mouth daily. Indications: Stroke Due To Limited Blood Flow     • tamsulosin (FLOMAX) 0.4 MG Cap Take 1 capsule by mouth nightly. Indications: Benign Enlargement of Prostate     • finasteride (PROSCAR) 5 MG tablet Take 5 mg by mouth nightly. Indications: Benign Enlargement of Prostate       No current facility-administered medications for this visit.     Medications reviewed / reconciled: Yes    BASELINE FUNCTIONAL STATUS:  Independent    CURRENT FUNCTIONAL STATUS:  Therapy eval today    DIET:  Consistency: Pureed  Type: cardiac diet  Appetite: Fair    REVIEW OF SYSTEMS:  Review of  Systems   Unable to perform ROS: Dementia     VITALS:  Visit Vitals  /57   Pulse 65   Temp 97.4 °F (36.3 °C)       PAIN SCORE:  Vitals:    11/14/23 0641   PainSc:  0     PHYSICAL ASSESSMENT:  Physical Exam  Vitals and nursing note reviewed.   Constitutional:       General: He is not in acute distress.     Appearance: Normal appearance. He is not ill-appearing.   HENT:      Neck: Normal range of motion.   Cardiovascular:      Rate and Rhythm: Normal rate and regular rhythm.      Heart sounds: Normal heart sounds.   Pulmonary:      Effort: Pulmonary effort is normal. No respiratory distress.      Breath sounds: Normal breath sounds.   Abdominal:      Palpations: Abdomen is soft.   Musculoskeletal:      Right lower leg: Edema present.      Left lower leg: Edema present.      Comments: FULLER   Neurological:      Mental Status: He is alert.      Comments: Oriented to self   Psychiatric:         Behavior: Behavior normal.       LABS:  11/13:  Wbc 5.39 Hgb 12.1 Plt 337  Na 143 K 4.0 Bun 22 Cr 0.85    ASSESSMENT AND PLAN    Acute R multiple ischemic infarcts: right basal ganglia, right parietal, right frontal and  right occipital lobes.  R M1 occlusion s/p thrombectomy  -likely source A fib vs atheroembolic, family had declined AC 2/2 hx GIB  -cont Asa  -f/u cards for ? L atrial appendage    Agitation/delirium  -per granddaughter pt was having memory and sun downing issues PTA  -Trazodone 25 mg at bedtime, Melatonin 5 mg at bedtime  -switch timing of Flomax and Proscar to at bedtime per granddaughter request  -cont Depakote  -Roman psych APN on consult  -POC d/w Dr Acuna and agrees    PAF  -family declined AC  -rate controlled  -cont Asa  -f/u w/ CV    HTN w/ CKD 2  -cont Losartan, Amlodipine  -CV diet    Dysphagia  -ST on consult  -cont modified diet  -aspiration precautions    Debility  -PT/OT  -fall precautions    DVT/GI ppx: start SQH 5000 units q 12, Pepcid 20 mg qhs    DISCHARGE PLANNING:   The patient was seen for an  initial visit     Discussed with: RN / Nursing and Family  Prognosis: fair:  Disposition:Stable / Unchanged  Patient is safe to transition to next level of care? No  Meds reviewed, reconciled  Attending MD aware and agrees w/ POC    PDPM reviewed No    Total time spent is 60 minutes.  Time spent in:  - Discussion of plan of care with the patient/family/staff regarding:     Prognosis, differential diagnosis, risks, and benefits of treatment(s), instructions,     compliance, and risk reduction  - Preparing to see the patient (eg, review of tests)  - Obtaining and/or reviewing separately obtained history  - Performing a medically appropriate examination and/or evaluation  - Counseling and educating the patient/family/caregiver  - Ordering medications, tests, or procedures  - Documenting clinical information in the electronic or other health record  - Independently interpreting results (not separately reported) and communicating results to the    patient/family/caregiver  - Care coordination (not separately reported)    Electronically signed by: Nicholas Acuna MD  11/14/2023

## 2024-02-10 NOTE — PROGRESS NOTES
Patient had 2 episodes this AM where is her head is laid flat she coughs and gags and vomits, this was post the NG being removed to see if that would help allievate the problem. After giving lidocaine via her trach she was able to tolerate a turn, she did slightly gag but did not vomit.  Updated ICU team, ordered to remove 1 ml water out of her trach cuff, RT notified, and to do nebulized lidocaine and place dobhoff tube Patient requests all Lab, Cardiology, and Radiology Results on their Discharge Instructions

## 2024-08-29 NOTE — OP NOTE
Department of Radiology  Post Procedure Progress Note      Pre-Procedure Diagnosis:  Staghorn lcalculi     Procedure Performed:  Nephrostomy tube insertion    Anesthesia: local / dilaudid    Findings: successful    Immediate Complications:  None    Estimated Blood Loss: minimal    SEE DICTATED PROCEDURE NOTE FOR COMPLETE DETAILS.     Barb Miranda MD   6/8/2022 10:48 AM Mann, Orrin, MD Severin-Brown, Darla, LPN  Phone Number: [phone]     Lumbar MRI ordered.  Please call patient to schedule so I can have the results by her September 11 visit.  Thanks, Jan

## 2024-09-16 NOTE — PROGRESS NOTES
1418 Patient received in IR for tunneled dialysis catheter removal.   2089 This procedure has been fully reviewed with the patient and verbal informed consent has been obtained. 1424 Procedure started. 1430 Procedure completed; patient tolerated well. Sutures, guaze and op-site applied to exit site; no bleeding noted. 1432 Patient on bed; comfort ensured. 1436 Patient taken to [place] via bed.
bilateral upper extremity Passive ROM was WFL (within functional limits)/bilateral lower extremity Passive ROM was WFL (within functional limits)

## 2024-10-09 NOTE — H&P
Formulation and discussion of sedation / procedure plans, risks, benefits, side effects and alternatives with patient and/or responsible adult completed.     Electronically signed by Haroon Hernández MD on 6/8/22 at 10:47 AM EDT Bam Perdue, PGY2 - nephro fellow consulted regarding CT imaging as concern is for appendicitis or any intrabdominal pathologies. whether it is ok to obtain Ct imaging w/ IV contrast or not. She will call back. Intact Bam Perdue, PGY2 - nephro not recommending IV contrast at this time with CKD and increased risk for contrast induced nephropathy.

## 2024-10-19 NOTE — PLAN OF CARE
Problem: At Risk for Falls  Goal: Patient does not fall  Outcome: Monitoring/Evaluating progress  Goal: Patient takes action to control fall-related risks  Outcome: Monitoring/Evaluating progress     Problem: Cardiac Rhythm Disturbances with or without Devices  Goal: Hemodynamic stability achieved/maintained  Outcome: Monitoring/Evaluating progress  Goal: Anxiety is controlled  Outcome: Monitoring/Evaluating progress  Goal: Participates in ADL/Activity without s/s of intolerance  Outcome: Monitoring/Evaluating progress  Goal: Urinary elimination pattern returned to baseline  Description: Patient must be making adequate amounts of urine output (240cc/8 hours) and voiding. If indwelling urinary catheter: Remove asap (no later an Day 2 or provider must specify reason for continued use).  Outcome: Monitoring/Evaluating progress  Goal: Verbalizes understanding of rhythm disturbance, treatment procedure and pre, post-, and d/c care specific to intervention  Description: Document on Patient Education Activity  Outcome: Monitoring/Evaluating progress     Problem: VTE (Actual)  Goal: Patient maintains mobility and remains free from complications of VTE  Outcome: Monitoring/Evaluating progress      Pt seen after midnight. Labs reviewed. Urology / Nephrology consulted. CT A/P wo contrast pending.     Electronically signed by Verenice Conrad PA-C on 4/12/2022 at 12:14 PM

## 2024-10-30 NOTE — TELEPHONE ENCOUNTER
Appointment changed to 10:00. Facility stated that EMS has to transfer her and they cannot do earlier than 10:00 due to load and load time and travel time. 74

## 2025-02-11 NOTE — TELEPHONE ENCOUNTER
----- Message from Tiara Hodges MD sent at 10/6/2017  9:17 AM EDT -----  Notify bmp and bnp are normal.  Continue bumex, verify weight is stable or declining, follow up next week as scheduled.
Pt notified of results and states her weight is 275 today -- in the office it was 280. She will continue the bumex and f/u with [facility] next week.
[email or web address]

## 2025-05-30 NOTE — DISCHARGE SUMMARY
"Subjective   Patient ID: Evi Higgins is a 57 y.o. female who presents for Nasal Congestion.    HPI    Evi presents with concerns of intermittent dry/wet cough has been ongoing since late 2025.  Concern of sinus infection versus allergies.  Went to urgent care May 23, 2025 for this condition and was found to have a left otitis media.  She was placed on Augmentin 875/125 mg and today being 7 out of 10 with antibiotic use.  Completed 6-day taper course of methylprednisone yesterday.  Does feel improved as far as the sinus congestion and pressure.  Does not have any ear pain today.  Denies any chest tightness and wheezing.  Is without fever and chills.  Continues to use over-the-counter antihistamine nasal spray and Zyrtec-D.      Medication Documentation Review Audit       Reviewed by Marry Sutherland MA (Medical Assistant) on 25 at 0736      Medication Order Taking? Sig Documenting Provider Last Dose Status   amoxicillin (Amoxil) 875 mg tablet [id number]  Take 1 tablet (875 mg) by mouth 2 times a day for 10 days. CLAY Mane  Active   estradioL (Imvexxy Maintenance Pack) 4 mcg insert [id number]  Insert one vaginal insert twice weekly Juliann Sheldon MD  Active   methylPREDNISolone (Medrol Dospak) 4 mg tablets [id number]  Follow schedule on package instructions CLAY Mane   25 2359                     Vitals:    25 0732   BP: 122/76   Pulse: 60   Resp: 20   Temp: 36.6 °C (97.8 °F)   TempSrc: Temporal   SpO2: 97%   Weight: 77.2 kg (170 lb 3.2 oz)   Height: 1.626 m (5' 4\")      Body mass index is 29.21 kg/m².     Review of Systems   All other systems reviewed and are negative.  And what is in the history of present illness.    Objective   Physical Exam  Vitals and nursing note reviewed.   Constitutional:       Appearance: Normal appearance.   HENT:      Head: Normocephalic.      Right Ear: Tympanic membrane, ear canal and external ear " following     Anxiety: Continue home medications    Initial H and P and Hospital course:-    73 y.o., , female presented [facility] ED for abnormal lab from nursing home.  Past medical history significant with chronic diastolic heart failure, obstructive staghorn calculus of the left kidney not a candidate for stone treatment which nephrostomy tube was placed, stage III decubitus ulcer, CKD stage III, general anxiety disorder.  Visit patient awake alert and oriented to time person and place.  Patient denies any chest pain, chest discomfort, abdominal pain, back pain, neck pain.  Patient sister on the bedside report that patient had been having bright red blood in nephrostomy tube over last 3 days.  [facility] patient nursing home notify abnormal lab work which patient was transferred to [facility].     Patient is chronic critical ill with PEG tube for nutritional, divers colostomy bag for stage III decubitus ulcer, nephrostomy tube drain bright red blood.  Patient living in nursing home, denies alcohol use, tobacco use.     Patient was admitted and managed for ALVIN secondary to dehydration and possible obstruction. In ED, febrile with BP elevate. BMP show hypokalemia with potassium 5.4, serum osmolarity elevate 322.1. EKG showed sinus tachycardia with PVC, St & T wave abnormality. Patient is asymptomatic for chest pain. CBC showed anemia with hemoglobin 7.5 which reduced compared to her baseline at 8 on 9. \"     4/13: pt doing okay, sitting up in bed with family at bedside. Denies fever/chills/CP/SOB. Still has dark blood noted in her nephrostomy bag. Denies any burning at the site of her catheter.      4/14: pt seen having returned from [facility] today and was noted to have episode of nausea/choking / coughing and a small amount of whitish emesis (barium). Pt denied abd pain and denied any SOB at that time.      4/15: pt lying in bed, no complaints. Still with blood in her nephrostomy bag. Denies SOB/cough.  No normal.      Left Ear: Tympanic membrane, ear canal and external ear normal.      Nose: Rhinorrhea present.      Comments: No tenderness with palpation over the frontal or maxillary sinuses.     Mouth/Throat:      Mouth: Mucous membranes are moist.      Pharynx: Oropharynx is clear.   Eyes:      Extraocular Movements: Extraocular movements intact.      Conjunctiva/sclera: Conjunctivae normal.      Pupils: Pupils are equal, round, and reactive to light.   Cardiovascular:      Rate and Rhythm: Normal rate and regular rhythm.      Pulses: Normal pulses.      Heart sounds: Normal heart sounds.   Pulmonary:      Effort: Pulmonary effort is normal. No respiratory distress.      Breath sounds: Normal breath sounds.   Abdominal:      General: Abdomen is flat. Bowel sounds are normal.      Palpations: Abdomen is soft.   Musculoskeletal:         General: Normal range of motion.      Cervical back: Normal range of motion and neck supple.      Right lower leg: No edema.      Left lower leg: No edema.   Lymphadenopathy:      Cervical: Cervical adenopathy present.   Skin:     General: Skin is warm and dry.      Capillary Refill: Capillary refill takes less than 2 seconds.   Neurological:      Mental Status: She is alert and oriented to person, place, and time.      Cranial Nerves: No cranial nerve deficit.      Sensory: No sensory deficit.      Motor: No weakness.      Gait: Gait normal.   Psychiatric:         Mood and Affect: Mood normal.         Behavior: Behavior normal.             Assessment/Plan   Assessment & Plan  Upper respiratory tract infection, unspecified type    Orders:   Restart fluticasone (Flonase) 50 mcg/actuation nasal spray; Administer 1 spray into each nostril once daily. Shake gently. Before first use, prime pump. After use, clean tip and replace cap.  Continue with your Augmentin.  Continue with Zyrtec-D and antihistamine nasal spray.  Your ear infection has cleared.  You are with upper respiratory  fever/chills. No CP. A&Ox4.      4/16: pt doing fine, lying in bed. Offers no new complaints. Had HD today. Still with blood in her nephrostomy bag. Hgb remains stable.     4/17: pt continues to have these choking like episodes where it appears she cannot breathe. She denies any sort of dysphagia or choking sensation, but notes that she feels nauseated, like \"dry heaving\". No fever/chills. Kidney function improving. Seems in brighter spirits today.      4/18: pt reports she is doing okay. No CP/SOB, working with her acapella. Still has blood in her nephrostomy bag. No fever/chills.      4/19: no new complaints, discussed with nurse. Unable to wean off the 1L NC as pt drops into the 80's. Encouraged to continue with incentive spirometry. Hold ASA / anticoagulation pending possible tunneled cath tomorrow.      4.20.2022 Patient seen this a.m. no complaints discussed the possible need for hemodialysis. On amoxicillin and ciprofloxacin for urinary tract infection.     4.21.2022 patient seen this a.m. no complaints resting comfortably. Creatinine 4.0, CBC is pending, will transfuse if okay with renal.  Tunneled dialysis catheter to be placed, marginal urine output minimal response to diuretics, anticoagulation and antiplatelets are on hold.     4.22.2022 patient seen this a.m. resting comfortably we will transfuse 1 unit of packed RBCs, Tunnel dialysis catheter to be placed today. Creatinine 2.4 today, GFR 20     4.23.2022 patient seen this a.m., was transfused 1 unit of blood during dialysis, recheck after dialysis hemoglobin was 8.5 today its 7.5. Urology is following.   Tunneled dialysis catheter was placed yesterday without complications     [phone] seen this a.m. hemoglobin stable at 7.8, possible discharge Monday if case management can obtain hemodialysis chair time.     4.25.2022 patient seen this a.m. discussed with family that she is not participating in PT and OT would have to go to a different nursing infection.  Restarting your Flonase can help with congestion and rhinitis and reducing cough.  If no better within the week give us a call.           CLAY Dudley 05/30/25 8:23 AM    home.  [facility] need hemod[facility]     4.26.2022patient seen today, participating in pt/ot. Hd chair/time obtained, will dc today to [facility]    Physical Exam:-  Vitals:   Patient Vitals for the past 24 hrs:   BP Temp Temp src Pulse Resp SpO2 Weight   04/28/22 0316 125/61 98.8 °F (37.1 °C) Oral 66 16 100 % 172 lb 9.9 oz (78.3 kg)   04/27/22 2315 (!) 125/59 98.8 °F (37.1 °C) Oral 66 16 100 % --   04/27/22 2030 133/62 98.9 °F (37.2 °C) Oral 68 16 93 % --   04/27/22 1601 (!) 119/58 98.6 °F (37 °C) Oral 63 16 100 % --   04/27/22 1114 (!) 113/47 98.6 °F (37 °C) Oral 61 16 100 % --   04/27/22 0908 134/61 98.2 °F (36.8 °C) Oral 66 16 100 % --     Weight:   Weight: 172 lb 9.9 oz (78.3 kg)   24 hour intake/output:     Intake/Output Summary (Last 24 hours) at 4/28/2022 0802  Last data filed at 4/28/2022 7710  Gross per 24 hour   Intake 840 ml   Output 820 ml   Net 20 ml         Discharge Medications:-      Medication List      START taking these medications    ipratropium-albuterol 0.5-2.5 (3) MG/3ML Soln nebulizer solution  Commonly known as: DUONEB  Inhale 3 mLs into the lungs every 4 hours as needed for Shortness of Breath        CHANGE how you take these medications    metoprolol tartrate 25 MG tablet  Commonly known as: LOPRESSOR  Take 1 tablet by mouth 2 times daily  What changed: how much to take     sodium chloride 0.9 % Soln  What changed: Another medication with the same name was removed. Continue taking this medication, and follow the directions you see here.         CONTINUE taking these medications    acetaminophen 650 MG extended release tablet  Commonly known as: TYLENOL     docusate sodium 100 MG capsule  Commonly known as: COLACE     famotidine 20 MG tablet  Commonly known as: PEPCID     ferrous sulfate 325 (65 Fe) MG tablet  Commonly known as: IRON 325  Take 1 tablet by mouth every other day     FLUoxetine 40 MG capsule  Commonly known as: PROZAC  Take 1 capsule by mouth daily     folic acid 1 MG tablet  Commonly known as: FOLVITE  Take 1 tablet by mouth daily     melatonin 3 MG Tabs tablet  Take 2 tablets by mouth nightly as needed (anxiety, sleep)     miconazole 2 % powder  Commonly known as: MICOTIN     MULTIVITAMIN WOMEN PO     senna 8.8 MG/5ML Syrp syrup  Commonly known as: SENOKOT     silver nitrate applicators 33-64 % applicator     sodium hypochlorite 0.125 % Soln external solution  Commonly known as: DAKINS  Apply Dakin's moistened gauze dressings to wound twice daily and as needed.         STOP taking these medications    Adempas 2.5 MG Tabs  Generic drug: Riociguat     aspirin 81 MG tablet     bumetanide 0.5 MG tablet  Commonly known as: BUMEX     hydrOXYzine 25 MG tablet  Commonly known as: ATARAX     metoclopramide 5 MG tablet  Commonly known as: REGLAN           Where to Get Your Medications      These medications were sent to [address] [place] [address], [address] [place] [id number]    Phone: [phone]   · ipratropium-albuterol 0.5-2.5 (3) MG/3ML Soln nebulizer solution  · metoprolol tartrate 25 MG tablet          Labs :-  Recent Results (from the past 72 hour(s))   POCT Glucose    Collection Time: 04/25/22 10:32 AM   Result Value Ref Range    POC Glucose 134 (H) 70 - 108 mg/dl   POCT Glucose    Collection Time: 04/25/22  3:37 PM   Result Value Ref Range    POC Glucose 103 70 - 108 mg/dl   POCT glucose    Collection Time: 04/25/22  8:42 PM   Result Value Ref Range    POC Glucose 104 70 - 108 mg/dl   CBC with Auto Differential    Collection Time: 04/26/22  5:25 AM   Result Value Ref Range    WBC 10.4 4.8 - 10.8 thou/mm3    RBC 2.61 (L) 4.20 - 5.40 mill/mm3    Hemoglobin 7.5 (L) 12.0 - 16.0 gm/dl    Hematocrit 25.8 (L) 37.0 - 47.0 %    MCV 98.9 81.0 - 99.0 fL    MCH 28.7 26.0 - 33.0 pg    MCHC 29.1 (L) 32.2 - 35.5 gm/dl    RDW-CV 15.0 (H) 11.5 - 14.5 %    RDW-SD 54.2 (H) 35.0 - 45.0 fL    Platelets 246 019  188 130 - 400 thou/mm3    MPV 9.0 (L) 9.4 - 12.4 fL    Seg Neutrophils 75.1 %    Lymphocytes 8.1 %    Monocytes 8.9 %    Eosinophils 6.7 %    Basophils 0.7 %    Immature Granulocytes 0.5 %    Segs Absolute 6.7 1.8 - 7.7 thou/mm3    Lymphocytes Absolute 0.7 (L) 1.0 - 4.8 thou/mm3    Monocytes Absolute 0.8 0.4 - 1.3 thou/mm3    Eosinophils Absolute 0.6 (H) 0.0 - 0.4 thou/mm3    Basophils Absolute 0.1 0.0 - 0.1 thou/mm3    Immature Grans (Abs) 0.04 0.00 - 0.07 thou/mm3    nRBC 0 /100 wbc   Basic Metabolic Panel    Collection Time: 04/27/22  4:20 AM   Result Value Ref Range    Sodium 135 135 - 145 meq/L    Potassium 4.3 3.5 - 5.2 meq/L    Chloride 100 98 - 111 meq/L    CO2 26 23 - 33 meq/L    Glucose 100 70 - 108 mg/dL    BUN 41 (H) 7 - 22 mg/dL    CREATININE 2.5 (H) 0.4 - 1.2 mg/dL    Calcium 8.2 (L) 8.5 - 10.5 mg/dL   Anion Gap    Collection Time: 04/27/22  4:20 AM   Result Value Ref Range    Anion Gap 9.0 8.0 - 16.0 meq/L   Glomerular Filtration Rate, Estimated    Collection Time: 04/27/22  4:20 AM   Result Value Ref Range    Est, Glom Filt Rate 19 (A) ml/min/1.73m2   POCT glucose    Collection Time: 04/27/22  6:33 AM   Result Value Ref Range    POC Glucose 98 70 - 108 mg/dl   POCT Glucose    Collection Time: 04/27/22 11:00 AM   Result Value Ref Range    POC Glucose 91 70 - 108 mg/dl   POCT glucose    Collection Time: 04/27/22  8:11 PM   Result Value Ref Range    POC Glucose 119 (H) 70 - 108 mg/dl   CBC with Auto Differential    Collection Time: 04/28/22  4:03 AM   Result Value Ref Range    WBC 8.9 4.8 - 10.8 thou/mm3    RBC 2.60 (L) 4.20 - 5.40 mill/mm3    Hemoglobin 7.6 (L) 12.0 - 16.0 gm/dl    Hematocrit 25.8 (L) 37.0 - 47.0 %    MCV 99.2 (H) 81.0 - 99.0 fL    MCH 29.2 26.0 - 33.0 pg    MCHC 29.5 (L) 32.2 - 35.5 gm/dl    RDW-CV 14.6 (H) 11.5 - 14.5 %    RDW-SD 53.4 (H) 35.0 - 45.0 fL    Platelets 201 086 - 066 thou/mm3    MPV 8.7 (L) 9.4 - 12.4 fL    Seg Neutrophils 72.0 %    Lymphocytes 10.0 %    Monocytes 9.3 % Eosinophils 7.2 %    Basophils 0.9 %    Immature Granulocytes 0.6 %    Segs Absolute 6.4 1.8 - 7.7 thou/mm3    Lymphocytes Absolute 0.9 (L) 1.0 - 4.8 thou/mm3    Monocytes Absolute 0.8 0.4 - 1.3 thou/mm3    Eosinophils Absolute 0.6 (H) 0.0 - 0.4 thou/mm3    Basophils Absolute 0.1 0.0 - 0.1 thou/mm3    Immature Grans (Abs) 0.05 0.00 - 0.07 thou/mm3    nRBC 0 /100 wbc   Basic Metabolic Panel    Collection Time: 04/28/22  4:03 AM   Result Value Ref Range    Sodium 133 (L) 135 - 145 meq/L    Potassium 4.7 3.5 - 5.2 meq/L    Chloride 98 98 - 111 meq/L    CO2 23 23 - 33 meq/L    Glucose 104 70 - 108 mg/dL    BUN 55 (H) 7 - 22 mg/dL    CREATININE 2.9 (H) 0.4 - 1.2 mg/dL    Calcium 8.4 (L) 8.5 - 10.5 mg/dL   Anion Gap    Collection Time: 04/28/22  4:03 AM   Result Value Ref Range    Anion Gap 12.0 8.0 - 16.0 meq/L   Glomerular Filtration Rate, Estimated    Collection Time: 04/28/22  4:03 AM   Result Value Ref Range    Est, Glom Filt Rate 16 (A) ml/min/1.73m2   POCT glucose    Collection Time: 04/28/22  6:10 AM   Result Value Ref Range    POC Glucose 109 (H) 70 - 108 mg/dl        Microbiology:    Blood culture #1:   Lab Results   Component Value Date    BC No growth-preliminary No growth  01/21/2022       Blood culture #2:No results found for: BLOODCULT2    Organism:    Lab Results   Component Value Date    LABGRAM  02/22/2022     Many segmented neutrophils observed. No epithelial cells observed. No bacteria seen.         MRSA culture only:No results found for: [facility]    Urine culture:   Lab Results   Component Value Date    LABURIN No growth-preliminary  01/20/2022    LABURIN [place] count: 1,000 CFU/mL  01/20/2022     Lab Results   Component Value Date    ORG Pseudomonas aeruginosa 04/12/2022    ORG Klebsiella pneumoniae 04/12/2022    ORG Enterococcus faecalis - (Group D) 04/12/2022        Respiratory culture: No results found for: CULTRESP    Aerobic and Anaerobic :  Lab Results   Component Value Date    LABAERO  02/22/2022 No growth-preliminary Current antibiotic therapy ineffective in vitro for at least one of culture isolates. LABAERO light growth  02/22/2022    LABAERO  02/22/2022     very light growth Isolates of Methicillin Resistant Staphylococcus coagulase negative (MRSE) do NOT require CONTACT isolation. Methicillin(Oxacillin)resistant strains of staphylococci (MRSA)or(MRSE)should be considered resistant to all classes of cephalosporins, penems and beta-lactams. Lab Results   Component Value Date    LABANAE  02/22/2022     No anaerobes isolated- preliminary No anaerobes isolated        Urinalysis:      Lab Results   Component Value Date    NITRU NEGATIVE 04/12/2022    WBCUA 50-75W/CLUMPS 04/12/2022    BACTERIA FEW 04/12/2022    RBCUA > 200 04/12/2022    BLOODU LARGE 04/12/2022    SPECGRAV 1.014 01/20/2022    GLUCOSEU NEGATIVE 04/12/2022       Radiology:-  CT ABDOMEN PELVIS WO CONTRAST Additional Contrast? None    Result Date: 4/12/2022  PROCEDURE: CT ABDOMEN PELVIS WO CONTRAST CLINICAL INFORMATION: Left staghorn calculus TECHNIQUE: CT of the abdomen and pelvis was performed without use of intravenous contrast. Axial images as well as sagittal and coronal reconstructions were obtained. All CT scans at this facility use dose modulation, iterative reconstruction, and/or weight-based dosing when appropriate to reduce radiation dose to as low as reasonably achievable. COMPARISON: CT abdomen and pelvis 3/13/2022 FINDINGS: Lower thorax: There are small bilateral pleural effusions. There is adjacent lung consolidation are present. The heart is enlarged. Abdomen: Evaluation is limited due to absence of contrast. There is no free intraperitoneal air. A gastrostomy tube is in the stomach. A left mid abdominal ostomy is stable and contains loops of bowel. There is no bowel obstruction. Calcifications are present in the lumen of the gallbladder. A nephrostomy tube is now present in the left kidney.  Fluid adjacent to the tube and kidney has noncontrast mean Hounsfield units of 12 (image 34). Subcapsular fluid measures up to 2.2 cm in width (image 38). Perinephric stranding is again noted. Calcifications in the left kidney are stable. Left-sided hydronephrosis is not significantly changed. Hypoattenuating lesions in the kidneys may be cysts but are incompletely characterized on the current study. There  are calcified granulomas in the liver and spleen. Atherosclerotic calcifications are present in the abdominal aorta without evidence of aneurysm. There is no mesenteric or retroperitoneal lymphadenopathy. Degenerative changes are seen in the thoracolumbar spine without evidence of aggressive osseous lesions. Pelvis: There is a Padilla catheter in a nondistended urinary bladder, likely accounting for gas in the bladder. Platelets are present in the pelvis. There are calcifications in the uterus. There is no pelvic or inguinal lymphadenopathy. There is persistent subcutaneous tissue irregularity posterior to the sacrum. Degenerative changes are present in the pelvis without evidence of aggressive osseous lesions. 1. Small bilateral pleural effusions with adjacent atelectasis/infiltrate. 2. Left-sided perinephric stranding and fluid collection adjacent to a left-sided nephrostomy tube, likely a subcapsular hematoma. Stable calcifications in the left kidney. 3. Cholelithiasis. 4. Sacral decubitus ulcer. Final report electronically signed by Dr. William Chung on 4/12/2022 5:00 PM    US RENAL COMPLETE    Result Date: 4/12/2022  RENAL ULTRASOUND COMPARISON: 1/3/2022. FINDINGS: Examination is significantly limited due to patient positioning and immobility. Increased renal cortical echogenicity is noted bilaterally, compatible with medical renal disease. Multiple shadowing calculi are noted within the left kidney. For example there is a 2.7 cm stone in the left kidney on image 41. Left-sided nephrostomy tube is partially visualized.  Mild caliectasis is noted in the left kidney. Left renal pelvis is nondilated. Nonspecific perinephric or subcapsular fluid is noted adjacent to the left kidney, measuring 5.3 x 2.1 x 1.4 cm on image 49. Urinary bladder contains a Padilla catheter. 1. Significantly limited exam. 2. Increased renal cortical echogenicity bilaterally, compatible with medical renal disease. 3. A left-sided nephrostomy tube is partially visualized. 4. Multiple left-sided renal calculi. 5. Mild left-sided caliectasis. The left renal pelvis is nondilated. 6. Nonspecific perinephric or subcapsular fluid is noted adjacent to the left kidney, measuring 5.3 x 2.1 x 1.4 cm.  This document has been electronically signed by: Layla Espitia M.D. on 04/12/2022 02:30 AM       Follow-up scheduled after discharge :-    in the next few days with Terry Birch MD    Consultations during this hospital stay:-  [] NONE [] Cardiology  [] Nephrology  [] Hemo onco   [] GI   [] ID  [] Endocrine  [] Pulm    [] Neuro    [] Psych   [] Urology  [] ENT   [] G SURGERY   []Ortho    []CV surg    [] Palliative  [] Hospice [] Pain management   []    []TCU   [] PT/OT  OTHERS:-    Disposition: SNF  Condition at Discharge: Stable    Time Spent:- 35 minutes    Electronically signed by Iveth Amos MD on 4/28/22 at 8:02 AM EDT   Discharging Hospitalist

## (undated) DEVICE — GLOVE ORANGE PI 7 1/2   MSG9075

## (undated) DEVICE — SWIVEL BRONCHSCP ANES 15 MM RUBBER CAP FIBEROPTIC SIL REUSE

## (undated) DEVICE — SPONGE LAP W18XL18IN WHT COT 4 PLY FLD STRUNG RADPQ DISP ST

## (undated) DEVICE — SUTURE PERMAHAND SZ 2-0 L18IN NONABSORBABLE BLK L26MM SH C012D

## (undated) DEVICE — GLOVE SURG SZ 7 L12IN FNGR THK94MIL TRNSLUC YEL LTX HYDRGEL

## (undated) DEVICE — BASIC SINGLE BASIN BTC-LF: Brand: MEDLINE INDUSTRIES, INC.

## (undated) DEVICE — PENCIL SMK EVAC 15FT BLADE ELECTRD ROCKER F/TELSCP

## (undated) DEVICE — SUTURE VCRL + SZ 3-0 L27IN ABSRB UD L26MM SH 1/2 CIR VCP416H

## (undated) DEVICE — PUMP SUC IRR TBNG L10FT W/ HNDPC ASSEMB STRYKEFLOW 2

## (undated) DEVICE — 35 ML SYRINGE LUER-LOCK TIP: Brand: MONOJECT

## (undated) DEVICE — GAUZE,SPONGE,8"X4",12PLY,XRAY,STRL,LF: Brand: MEDLINE

## (undated) DEVICE — HYPODERMIC SAFETY NEEDLE: Brand: MAGELLAN

## (undated) DEVICE — PREP SOL PVP IODINE 4%  4 OZ/BTL

## (undated) DEVICE — SUTURE VCRL + SZ 0 L27IN ABSRB VLT L36MM CT-1 1/2 CIR VCPB260H

## (undated) DEVICE — TROCAR: Brand: KII® SLEEVE

## (undated) DEVICE — SUTURE VCRL + SZ 0 L18IN ABSRB TIE VCP106G

## (undated) DEVICE — TUBING INSUFFLATOR HEAT HUMIDIFIED SMK EVAC SET PNEUMOCLEAR

## (undated) DEVICE — Z DISCONTINUED BY MEDLINE USE 2711682 TRAY SKIN PREP DRY W/ PREM GLV

## (undated) DEVICE — SUTURE PROL SZ 0 L30IN NONABSORBABLE BLU L36MM CT-1 1/2 CIR 8424H

## (undated) DEVICE — BREAST HERNIA PACK: Brand: MEDLINE INDUSTRIES, INC.

## (undated) DEVICE — SOLUTION SURG PREP POV IOD 7.5% 4 OZ

## (undated) DEVICE — AGENT HEMSTAT 3GM OXIDIZED REGENERATED CELOS ABSRB FOR CONT (ORDER MULTIPLES OF 5EA)

## (undated) DEVICE — PENCIL SMK EVAC ALL IN 1 DSGN ENH VISIBILITY IMPROVED AIR

## (undated) DEVICE — SUTURE PERMA-HAND SZ 2-0 L30IN NONABSORBABLE BLK L26MM SH K833H

## (undated) DEVICE — GOWN,SIRUS,NON REINFRCD,LARGE,SET IN SL: Brand: MEDLINE

## (undated) DEVICE — SUTURE VCRL + SZ 4-0 L27IN ABSRB WHT FS-2 3/8 CIR REV CUT VCP422H

## (undated) DEVICE — TROCAR: Brand: KII FIOS FIRST ENTRY

## (undated) DEVICE — INTENDED FOR TISSUE SEPARATION, AND OTHER PROCEDURES THAT REQUIRE A SHARP SURGICAL BLADE TO PUNCTURE OR CUT.: Brand: BARD-PARKER ® CARBON RIB-BACK BLADES

## (undated) DEVICE — ADHESIVE SKIN CLSR 0.7ML TOP DERMBND ADV

## (undated) DEVICE — APPLICATOR MEDICATED 26 CC SOLUTION HI LT ORNG CHLORAPREP